# Patient Record
Sex: MALE | Race: WHITE | NOT HISPANIC OR LATINO | Employment: OTHER | URBAN - METROPOLITAN AREA
[De-identification: names, ages, dates, MRNs, and addresses within clinical notes are randomized per-mention and may not be internally consistent; named-entity substitution may affect disease eponyms.]

---

## 2017-01-12 ENCOUNTER — OFFICE VISIT (OUTPATIENT)
Dept: CARDIOLOGY | Facility: MEDICAL CENTER | Age: 62
End: 2017-01-12
Payer: MEDICARE

## 2017-01-12 VITALS
BODY MASS INDEX: 36.94 KG/M2 | HEART RATE: 76 BPM | DIASTOLIC BLOOD PRESSURE: 90 MMHG | SYSTOLIC BLOOD PRESSURE: 112 MMHG | OXYGEN SATURATION: 96 % | WEIGHT: 258 LBS | HEIGHT: 70 IN

## 2017-01-12 DIAGNOSIS — G45.9 TRANSIENT CEREBRAL ISCHEMIA, UNSPECIFIED TYPE: ICD-10-CM

## 2017-01-12 DIAGNOSIS — Z95.2 H/O MITRAL VALVE REPLACEMENT: ICD-10-CM

## 2017-01-12 DIAGNOSIS — Z79.01 CHRONIC ANTICOAGULATION: ICD-10-CM

## 2017-01-12 DIAGNOSIS — I34.2 NON-RHEUMATIC MITRAL VALVE STENOSIS: ICD-10-CM

## 2017-01-12 DIAGNOSIS — I48.91 ATRIAL FIBRILLATION, UNSPECIFIED TYPE (HCC): ICD-10-CM

## 2017-01-12 DIAGNOSIS — Z95.0 CARDIAC PACEMAKER IN SITU: ICD-10-CM

## 2017-01-12 PROCEDURE — 99214 OFFICE O/P EST MOD 30 MIN: CPT | Performed by: INTERNAL MEDICINE

## 2017-01-12 ASSESSMENT — ENCOUNTER SYMPTOMS
PALPITATIONS: 0
BLURRED VISION: 0
PND: 0
INSOMNIA: 0
ABDOMINAL PAIN: 0
LOSS OF CONSCIOUSNESS: 0
FEVER: 0
SHORTNESS OF BREATH: 1
DIZZINESS: 0
MYALGIAS: 0
ORTHOPNEA: 0
CHILLS: 0

## 2017-01-12 NOTE — Clinical Note
[facility]   [address], [address]  [place] [address]  Phone: [phone]  Fax: [phone]              Morales Olivier  1955    Encounter Date: 1/12/2017    Sumanth Yancey M.D.          PROGRESS NOTE:  Subjective:   Morales Olivier is a 61 y.o. male who presents today for follow up of mitral valve replacement and study result review.    Since the discharge from [facility] on 01/08/17 status post cardiac catheterization, he has continued to experience mild dyspnea and chest pain. He denies chest pain, palpitations, nausea/vomiting or diaphoresis.    Past Medical History   Diagnosis Date   • Gout    • CAD (coronary artery disease)    • Hypertension    • Atrial fibrillation (HCC)    • Pacemaker    • Heart valve disease      mitral valve replacement (bovine)   • Renal disorder      kidney stones   • Bronchitis    • Type II or unspecified type diabetes mellitus without mention of complication, not stated as uncontrolled      DM type II- diet controlled   • Personal history of venous thrombosis and embolism 2009     DVT right leg     Past Surgical History   Procedure Laterality Date   • Mitral valve replace  3/14/08     Performed by JEANA MARTELL at SURGERY [facility]   • Maze procedure  3/14/08     Performed by JEANA MARTELL at SURGERY [facility]   • Angiogram  7/3/2009     Performed by MORGAN WARD at SURGERY [facility]   • Angiogram  7/4/2009     Performed by MICHEL URBINA at SURGERY [facility]   • Cath removal  7/4/2009     Performed by MICHEL URBINA at SURGERY [facility]   • Thrombectomy  7/4/2009     Performed by MICHEL URBINA at [facility]   • Embolectomy  7/4/2009     Performed by MICHEL URBINA at [facility]   • Irrigation & debridement general  7/20/2009     Performed by MICHEL URBINA at [facility]   • Wide excision  7/20/2009     Performed by CIARA  "MICHEL F at SURGERY [facility]   • Other cardiac surgery  2008     mitral valve replacement   • Other abdominal surgery       imbulica repair      History reviewed. No pertinent family history.  History   Smoking status   • Former Smoker   • Quit date: 01/01/1981   Smokeless tobacco   • Never Used     No Known Allergies    Medications reviewed.    Outpatient Encounter Prescriptions as of 1/12/2017   Medication Sig Dispense Refill   • albuterol 108 (90 BASE) MCG/ACT Aero Soln inhalation aerosol Inhale 2 Puffs by mouth every 6 hours as needed for Shortness of Breath. 8.5 g 0   • tamsulosin (FLOMAX) 0.4 MG capsule Take 0.4 mg by mouth ONE-HALF HOUR AFTER BREAKFAST.     • metoprolol (LOPRESSOR) 25 MG TABS Take 1 Tab by mouth 2 Times a Day. 60 Tab 11   • warfarin (COUMADIN) 5 MG TABS Take 2.5-5 mg by mouth See Admin Instructions. 2.5 mg on Tuesday and Thursday   5 mg on Monday, Wednesday, Friday, Saturday , Sunday     • acetaminophen (TYLENOL) 500 MG TABS Take 1,000 mg by mouth 2 times a day as needed. Indications: Pain       No facility-administered encounter medications on file as of 1/12/2017.     Review of Systems   Constitutional: Positive for malaise/fatigue. Negative for fever and chills.   HENT: Negative for congestion.    Eyes: Negative for blurred vision.   Respiratory: Positive for shortness of breath.    Cardiovascular: Negative for chest pain, palpitations, orthopnea, leg swelling and PND.   Gastrointestinal: Negative for abdominal pain.   Genitourinary: Negative for dysuria.   Musculoskeletal: Negative for myalgias and joint pain.   Skin: Positive for itching and rash.   Neurological: Negative for dizziness and loss of consciousness.   Psychiatric/Behavioral: The patient does not have insomnia.         Objective:   /90 mmHg  Pulse 76  Ht 1.778 m (5' 10\")  Wt 117.028 kg (258 lb)  BMI 37.02 kg/m2  SpO2 96%    Physical Exam   Constitutional: He is oriented to person, place, and time. He appears " well-developed and well-nourished.   HENT:   Head: Normocephalic and atraumatic.   Eyes: Conjunctivae are normal. Pupils are equal, round, and reactive to light.   Neck: Normal range of motion. Neck supple.   Cardiovascular: Normal rate.  An irregularly irregular rhythm present.   Pulmonary/Chest: Effort normal and breath sounds normal.   Abdominal: Soft. Bowel sounds are normal.   Musculoskeletal: Normal range of motion. He exhibits no edema.   Neurological: He is alert and oriented to person, place, and time.   Skin: Skin is warm and dry. Rash noted.   Psychiatric: He has a normal mood and affect.     CARDIAC STUDIES/PROCEDURES:    CARDIAC CATHETERIZATION CONCLUSIONS by Dr. Kerr (01/08/17)  1.  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  2.  Pulmonary hypertension.  Moderate.  3.  EF 47%.  4.  Patent coronary arteries.    CARDIAC CATHETERIZATION CONCLUSIONS by Dr. Peterson (03/13/08)  3. Essentially normal coronary arterial tree and mild plaquing noted.  4. Unusually small distal half of the left anterior descending coronary artery.    CAROTID ULTRASOUND (11/15/14)  Antegrade flow, bilateral vertebral arteries.   Flow within both subclavian arteries appears to be within normal limits.   Normal bilateral cervical carotid system.    ECHOCARDIOGRAM CONCLUSIONS (11/15/16)  Prior echocardiogram 5/15/2016. Compared to the report of the study   done - there has been no significant change.   Grossly normal left ventricular systolic function.  Left ventricular ejection fraction is visually estimated to be 50%.  Diastolic function is difficult to assess.   Pacer/ICD wire seen in right ventricle.  Known mitral valve bioprosthesis with probable moderate to severe stenosis.  Moderate tricuspid regurgitation.  Estimated right ventricular systolic pressure is 45 mmHg.    ECHOCARDIOGRAM CONCLUSIONS (05/05/16)  Prior study is available for comparison, 06/30/2015.   Known mitral valve bioprosthesis with probable  stenosis based on ASE   criteria ( peak velocity 1.9-2.5 m/s, mean gradient 6-10).  Mean transvalvular gradient is 9 mmHg at a heart rate of  94 BPM.  Peak MV velocity 2.1 m/s Mild mitral regurgitation.  Mild concentric left ventricular hypertrophy.  Low normal left ventricular systolic function.  Left ventricular ejection fraction is visually estimated to be 50%.  Severely dilated left atrium.  Compared to the prior study of 6/3/2015, the prosthetic MV gradients   were actually slightly higher on the prior study.  The curent study is compatible with probable prosthetic valve stenosis   based on ASE criteria.    ECHOCARDIOGRAM CONCLUSIONS (06/30/15)  Normal left ventricular chamber size.   Mild concentric left ventricular hypertrophy.   Mildly reduced left ventricular systolic function.   Left ventricular ejection fraction is 50% to 55%.   Diastolic function is difficult to assess.  Severely dilated right atrium.   Catheter/pacemaker wire present in the right atrial cavity.  Severely dilated left atrium.  Mitral Valve Replacement. Mean transvalvular gradient is 9-10 mmHg.   Mild to moderate mitral regurgitation. At least moderate mitral stenosis.   Compared to the report of July 2014: the prosthetic mitral valve   stenosis is more severe. Prior mean gradient was 8 mm Hg.    ECHOCARDIOGRAM CONCLUSIONS (07/25/14)  Low normal left ventricular systolic function.  Left ventricular ejection fraction is 50% to 55%.  Bovine bioprosthetic valve with a mean gradient of 8 mmHg.  Moderate mitral regurgitation.  Moderate bioprosthetic valve stenosis.    EKG performed on (12/14/16) was reviewed: EKG shows sinus tachycardia.  EKG performed on (11/22/16) EKG shows sinus rhythm.  EKG performed on (01/28/15) EKG shows atrial fibrillation.    Laboratory results of (11/14/14) Cholesterol profile of 134/138/23/83 noted.    TRANSESOPHAGEAL ECHOCARDIOGRAM CONCLUSIONS by Dr. Fitch (12/06/16)  Patient in atrial fibrillation during exam.   Grossly normal   biventricular systolic function. Known bioprosthetic mitral valve with   evidence of leaflet thickening and decreased mobility. Severe   bioprosthetic mitral stenosis. Mean gradient 10 mm Hg, valve area by   3-D planimetry is 0.9 cm2.  Moderate-severe tricuspid regurgitation.    Assessment:     1. H/O mitral valve replacement    2. Atrial fibrillation    3. Cardiac pacemaker in situ    4. Chronic anticoagulation    5. Hypertension      Medical Decision Making:  Today's Assessment / Status / Plan:     1. History of mitral valve replacement (29-mm Perimount bioprosthetic valve), resection of left atrial tumor (thrombus) by Dr. Wright on 03/10/08: His recent echocardiogram showed progressing bioprosthetic valve stenosis and he is now symptomatic.this was confirmed with transesophageal echocardiogram with Dr. Fitch and cardiac catheterization by Dr. Kerr. He will consult with Dr. Wright tomorrow for possible redo surgery.  2. Atrial fibrillation/sick sinus syndrome with pacemaker and chronic anticoagulation therapy (warfarin): Stable.  3. Hypertension: Blood pressure is well controlled.  4. History of embolus to the right popliteal artery treated with thrombolytic.    We will follow up in three month to reassess his symptoms.    CC [facility]         No Recipients

## 2017-01-12 NOTE — MR AVS SNAPSHOT
"        Morales Olivier   2017 11:20 AM   Office Visit   MRN: [id number]    Department:  [facility]   Dept Phone:  [phone]    Description:  Male : 1955   Provider:  Sumanth Yancey M.D.           Reason for Visit     Follow-Up           Allergies as of 2017     No Known Allergies      You were diagnosed with     H/O mitral valve replacement   [319887]       Non-rheumatic mitral valve stenosis   [942233]       Atrial fibrillation, unspecified type ([facility])   [6232005]       Cardiac pacemaker in situ   [V45.01.ICD-9-CM]       Chronic anticoagulation   [298509]       Transient cerebral ischemia, unspecified type   [9531853]         Vital Signs     Blood Pressure Pulse Height Weight Body Mass Index Oxygen Saturation    112/90 mmHg 76 1.778 m (5' 10\") 117.028 kg (258 lb) 37.02 kg/m2 96%    Smoking Status                   Former Smoker           Basic Information     Date Of Birth Sex Race Ethnicity Preferred Language    1955 Male White Non- English      Your appointments     2017 11:20 AM   FOLLOW UP with Sumanth Yancey M.D.   [facility] (--)    [address], [facility] [address]  [facility] [id number] [phone]              Problem List              ICD-10-CM Priority Class Noted - Resolved    Open wound T14.8   2009 - Present    GOUT    2009 - Present    Anticoagulated Z79.01   2010 - Present    Hypertension I10   2010 - Present    Cardiac pacemaker in situ Z95.0   2014 - Present    Rib fracture S22.39XA   2014 - Present    Orbital floor fracture (HCC) S02.30XA   2014 - Present    TIA (transient ischemic attack) G45.9   2014 - Present    Atrial fibrillation (HCC) I48.91   2015 - Present    Chronic anticoagulation Z79.01   2015 - Present    H/O mitral valve replacement Z95.2   2015 - Present    COPD exacerbation (HCC) J44.1   2016 - Present    Bronchitis J40   2016 - Present   " Mitral stenosis I05.0   12/14/2016 - Present      Health Maintenance        Date Due Completion Dates    IMM PNEUMOCOCCAL 19-64 (ADULT) MEDIUM RISK SERIES (1 of 1 - PPSV23) 6/23/1973 ---    COLONOSCOPY 6/23/2004 ---    IMM ZOSTER VACCINE 6/23/2014 ---    IMM INFLUENZA (1) 9/1/2016 ---    IMM DTaP/Tdap/Td Vaccine (2 - Td) 7/31/2024 7/31/2014            Current Immunizations     Tdap Vaccine 7/31/2014  3:29 PM      Below and/or attached are the medications your provider expects you to take. Review all of your home medications and newly ordered medications with your provider and/or pharmacist. Follow medication instructions as directed by your provider and/or pharmacist. Please keep your medication list with you and share with your provider. Update the information when medications are discontinued, doses are changed, or new medications (including over-the-counter products) are added; and carry medication information at all times in the event of emergency situations     Allergies:  No Known Allergies          Medications  Valid as of: January 12, 2017 - 11:34 AM    Generic Name Brand Name Tablet Size Instructions for use    Acetaminophen (Tab) TYLENOL 500 MG Take 1,000 mg by mouth 2 times a day as needed. Indications: Pain        Albuterol Sulfate (Aero Soln) albuterol 108 (90 BASE) MCG/ACT Inhale 2 Puffs by mouth every 6 hours as needed for Shortness of Breath.        Metoprolol Tartrate (Tab) LOPRESSOR 25 MG Take 1 Tab by mouth 2 Times a Day.        Tamsulosin HCl (Cap) FLOMAX 0.4 MG Take 0.4 mg by mouth ONE-HALF HOUR AFTER BREAKFAST.        Warfarin Sodium (Tab) COUMADIN 5 MG Take 2.5-5 mg by mouth See Admin Instructions. 2.5 mg on Tuesday and Thursday   5 mg on Monday, Wednesday, Friday, Saturday , Sunday        .                 Medicines prescribed today were sent to:     [facility] [place] [address]    [address] [place] [address]    Phone: [phone] Fax: [phone]    Open 24 Hours?: No         Medication refill instructions:       If your prescription bottle indicates you have medication refills left, it is not necessary to call your provider’s office. Please contact your pharmacy and they will refill your medication.    If your prescription bottle indicates you do not have any refills left, you may request refills at any time through one of the following ways: The online [employer] system (except Urgent Care), by calling your provider’s office, or by asking your pharmacy to contact your provider’s office with a refill request. Medication refills are processed only during regular business hours and may not be available until the next business day. Your provider may request additional information or to have a follow-up visit with you prior to refilling your medication.   *Please Note: Medication refills are assigned a new Rx number when refilled electronically. Your pharmacy may indicate that no refills were authorized even though a new prescription for the same medication is available at the pharmacy. Please request the medicine by name with the pharmacy before contacting your provider for a refill.           [employer] Status: Patient Declined

## 2017-03-07 ENCOUNTER — HOSPITAL ENCOUNTER (OUTPATIENT)
Dept: RADIOLOGY | Facility: MEDICAL CENTER | Age: 62
DRG: 219 | End: 2017-03-07
Attending: THORACIC SURGERY (CARDIOTHORACIC VASCULAR SURGERY) | Admitting: THORACIC SURGERY (CARDIOTHORACIC VASCULAR SURGERY)
Payer: MEDICARE

## 2017-03-07 ENCOUNTER — HOSPITAL ENCOUNTER (OUTPATIENT)
Dept: CARDIOLOGY | Facility: MEDICAL CENTER | Age: 62
DRG: 219 | End: 2017-03-07
Attending: THORACIC SURGERY (CARDIOTHORACIC VASCULAR SURGERY) | Admitting: THORACIC SURGERY (CARDIOTHORACIC VASCULAR SURGERY)
Payer: MEDICARE

## 2017-03-07 DIAGNOSIS — I34.0 MITRAL VALVE INSUFFICIENCY, UNSPECIFIED ETIOLOGY: ICD-10-CM

## 2017-03-07 LAB
ABO GROUP BLD: NORMAL
ALBUMIN SERPL BCP-MCNC: 4.2 G/DL (ref 3.2–4.9)
ALBUMIN/GLOB SERPL: 2 G/DL
ALP SERPL-CCNC: 62 U/L (ref 30–99)
ALT SERPL-CCNC: 7 U/L (ref 2–50)
ANION GAP SERPL CALC-SCNC: 8 MMOL/L (ref 0–11.9)
APTT PPP: 27.1 SEC (ref 24.7–36)
AST SERPL-CCNC: 13 U/L (ref 12–45)
BASOPHILS # BLD AUTO: 0.5 % (ref 0–1.8)
BASOPHILS # BLD: 0.03 K/UL (ref 0–0.12)
BILIRUB SERPL-MCNC: 1.2 MG/DL (ref 0.1–1.5)
BLD GP AB SCN SERPL QL: NORMAL
BUN SERPL-MCNC: 21 MG/DL (ref 8–22)
CALCIUM SERPL-MCNC: 9.3 MG/DL (ref 8.5–10.5)
CFT BLD TEG: 7.3 MIN (ref 5–10)
CHLORIDE SERPL-SCNC: 110 MMOL/L (ref 96–112)
CLOT ANGLE BLD TEG: 59.3 DEGREES (ref 53–72)
CLOT LYSIS 30M P MA LENFR BLD TEG: 0 % (ref 0–8)
CO2 SERPL-SCNC: 22 MMOL/L (ref 20–33)
CREAT SERPL-MCNC: 1.23 MG/DL (ref 0.5–1.4)
CT.EXTRINSIC BLD ROTEM: 2.3 MIN (ref 1–3)
EKG IMPRESSION: NORMAL
EOSINOPHIL # BLD AUTO: 0.15 K/UL (ref 0–0.51)
EOSINOPHIL NFR BLD: 2.3 % (ref 0–6.9)
ERYTHROCYTE [DISTWIDTH] IN BLOOD BY AUTOMATED COUNT: 47.3 FL (ref 35.9–50)
EST. AVERAGE GLUCOSE BLD GHB EST-MCNC: 123 MG/DL
GLOBULIN SER CALC-MCNC: 2.1 G/DL (ref 1.9–3.5)
GLUCOSE SERPL-MCNC: 131 MG/DL (ref 65–99)
HBA1C MFR BLD: 5.9 % (ref 0–5.6)
HCT VFR BLD AUTO: 45.9 % (ref 42–52)
HGB BLD-MCNC: 15.4 G/DL (ref 14–18)
IMM GRANULOCYTES # BLD AUTO: 0.02 K/UL (ref 0–0.11)
IMM GRANULOCYTES NFR BLD AUTO: 0.3 % (ref 0–0.9)
INR PPP: 1.17 (ref 0.87–1.13)
LYMPHOCYTES # BLD AUTO: 0.76 K/UL (ref 1–4.8)
LYMPHOCYTES NFR BLD: 11.7 % (ref 22–41)
MCF BLD TEG: 61.1 MM (ref 50–70)
MCH RBC QN AUTO: 31.7 PG (ref 27–33)
MCHC RBC AUTO-ENTMCNC: 33.6 G/DL (ref 33.7–35.3)
MCV RBC AUTO: 94.4 FL (ref 81.4–97.8)
MONOCYTES # BLD AUTO: 0.47 K/UL (ref 0–0.85)
MONOCYTES NFR BLD AUTO: 7.3 % (ref 0–13.4)
NEUTROPHILS # BLD AUTO: 5.05 K/UL (ref 1.82–7.42)
NEUTROPHILS NFR BLD: 77.9 % (ref 44–72)
NRBC # BLD AUTO: 0 K/UL
NRBC BLD AUTO-RTO: 0 /100 WBC
PA AA BLD-ACNC: 6.8 %
PA ADP BLD-ACNC: 24.4 %
PLATELET # BLD AUTO: 160 K/UL (ref 164–446)
PMV BLD AUTO: 9.7 FL (ref 9–12.9)
POTASSIUM SERPL-SCNC: 4 MMOL/L (ref 3.6–5.5)
PROT SERPL-MCNC: 6.3 G/DL (ref 6–8.2)
PROTHROMBIN TIME: 15.3 SEC (ref 12–14.6)
RBC # BLD AUTO: 4.86 M/UL (ref 4.7–6.1)
RH BLD: NORMAL
SODIUM SERPL-SCNC: 140 MMOL/L (ref 135–145)
TEG ALGORITHM TGALG: NORMAL
WBC # BLD AUTO: 6.5 K/UL (ref 4.8–10.8)

## 2017-03-07 PROCEDURE — 71020 DX-CHEST-2 VIEWS: CPT

## 2017-03-07 PROCEDURE — 85347 COAGULATION TIME ACTIVATED: CPT

## 2017-03-07 PROCEDURE — 86900 BLOOD TYPING SEROLOGIC ABO: CPT

## 2017-03-07 PROCEDURE — 86850 RBC ANTIBODY SCREEN: CPT

## 2017-03-07 PROCEDURE — 93010 ELECTROCARDIOGRAM REPORT: CPT | Performed by: INTERNAL MEDICINE

## 2017-03-07 PROCEDURE — 81001 URINALYSIS AUTO W/SCOPE: CPT

## 2017-03-07 PROCEDURE — 86901 BLOOD TYPING SEROLOGIC RH(D): CPT

## 2017-03-07 PROCEDURE — 83036 HEMOGLOBIN GLYCOSYLATED A1C: CPT

## 2017-03-07 PROCEDURE — 93880 EXTRACRANIAL BILAT STUDY: CPT | Mod: 26 | Performed by: SURGERY

## 2017-03-07 PROCEDURE — 85730 THROMBOPLASTIN TIME PARTIAL: CPT

## 2017-03-07 PROCEDURE — 85610 PROTHROMBIN TIME: CPT

## 2017-03-07 PROCEDURE — 93005 ELECTROCARDIOGRAM TRACING: CPT | Performed by: THORACIC SURGERY (CARDIOTHORACIC VASCULAR SURGERY)

## 2017-03-07 PROCEDURE — 93880 EXTRACRANIAL BILAT STUDY: CPT

## 2017-03-07 PROCEDURE — 85576 BLOOD PLATELET AGGREGATION: CPT | Mod: 91

## 2017-03-07 PROCEDURE — 85384 FIBRINOGEN ACTIVITY: CPT

## 2017-03-07 PROCEDURE — 85025 COMPLETE CBC W/AUTO DIFF WBC: CPT

## 2017-03-07 PROCEDURE — 80053 COMPREHEN METABOLIC PANEL: CPT

## 2017-03-07 RX ORDER — NICOTINE 21 MG/24HR
14 PATCH, TRANSDERMAL 24 HOURS TRANSDERMAL
Status: DISCONTINUED | OUTPATIENT
Start: 2017-03-07 | End: 2017-03-08

## 2017-03-07 ASSESSMENT — LIFESTYLE VARIABLES
EVER_SMOKED: YES
ALCOHOL_USE: NO

## 2017-03-07 NOTE — PROGRESS NOTES
During pre-op appt/education, patient informed RN that he smokes marijuana almost daily.  Patient resides [place] [place] [place] [address] and is concerned about transportation and his ability to arrive on time tomorrow morning for his surgery.  Patients' ECs are his 2 sisters that live in [place].  He does too have a friend Gerald who lives in the motel with him, but he does not drive.  Patient also states that he only has $20 to last him until the end of the month.  This RN obtained cab voucher from  and provided it to patient along with instructions on how to use it tomorrow morning.  Linda CORTÉS with [facility] also notified of situation.

## 2017-03-07 NOTE — CARE PLAN
Problem: Pre Op  Goal: Optimal preparation for CABG/Heart Valve surgery  Intervention: Pre Op education to patient/significant other. Provide patient [facility] Patient Guideline for Cardiac Surgery (See Pt. Ed.)  Discussed anatomy and physiology of cardiac surgery with patient and family to include pre-op regimen. Reviewed post-op expectations to include  the use of incentive spirometry with return demonstration, ventilator management, cardiac monitoring, tubes and drains, early ambulation, and expected length of stay. Also provided information on Cardiac Rehab and how to schedule an appointment. Patient and family state full understanding of all information given.  Intervention: Baseline assessment documented to include IS volume, weight, bilateral BP and peripheral pulses.  2500 mL  Intervention: NPO at midnight except cardiac medications. (No ASA, coumadin or Plavix)  Instructed patient nothing to eat or drink after midnight the night prior to scheduled surgery date.  Intervention: Shower with chlorhexidine x 2  Instructed patient to wash entire body with chlorhexedine wipes prior to bedtime the night before surgery.

## 2017-03-08 ENCOUNTER — APPOINTMENT (OUTPATIENT)
Dept: RADIOLOGY | Facility: MEDICAL CENTER | Age: 62
DRG: 219 | End: 2017-03-08
Attending: THORACIC SURGERY (CARDIOTHORACIC VASCULAR SURGERY)
Payer: MEDICARE

## 2017-03-08 ENCOUNTER — HOSPITAL ENCOUNTER (INPATIENT)
Facility: MEDICAL CENTER | Age: 62
LOS: 4 days | DRG: 219 | End: 2017-03-12
Attending: THORACIC SURGERY (CARDIOTHORACIC VASCULAR SURGERY) | Admitting: THORACIC SURGERY (CARDIOTHORACIC VASCULAR SURGERY)
Payer: MEDICARE

## 2017-03-08 ENCOUNTER — RESOLUTE PROFESSIONAL BILLING HOSPITAL PROF FEE (OUTPATIENT)
Dept: OTHER | Facility: MEDICAL CENTER | Age: 62
End: 2017-03-08
Payer: MEDICARE

## 2017-03-08 ENCOUNTER — APPOINTMENT (OUTPATIENT)
Dept: RADIOLOGY | Facility: MEDICAL CENTER | Age: 62
DRG: 219 | End: 2017-03-08
Attending: NURSE PRACTITIONER
Payer: MEDICARE

## 2017-03-08 DIAGNOSIS — I05.9 MITRAL VALVE DISORDER: ICD-10-CM

## 2017-03-08 LAB
ACT BLD: 126 SEC (ref 74–137)
ACT BLD: 142 SEC (ref 74–137)
ACT BLD: 533 SEC (ref 74–137)
ACT BLD: 574 SEC (ref 74–137)
ACT BLD: 621 SEC (ref 74–137)
ACT BLD: 703 SEC (ref 74–137)
ACT BLD: 755 SEC (ref 74–137)
ACT BLD: 909 SEC (ref 74–137)
APTT PPP: 28.1 SEC (ref 24.7–36)
BARCODED ABORH UBTYP: 6200
BARCODED PRD CODE UBPRD: NORMAL
BARCODED UNIT NUM UBUNT: NORMAL
BASE EXCESS BLDA CALC-SCNC: -1 MMOL/L (ref -4–3)
BASE EXCESS BLDA CALC-SCNC: -1 MMOL/L (ref -4–3)
BASE EXCESS BLDA CALC-SCNC: -2 MMOL/L (ref -4–3)
BASE EXCESS BLDA CALC-SCNC: -4 MMOL/L (ref -4–3)
BASE EXCESS BLDA CALC-SCNC: -5 MMOL/L (ref -4–3)
BASE EXCESS BLDA CALC-SCNC: -5 MMOL/L (ref -4–3)
BASE EXCESS BLDA CALC-SCNC: -6 MMOL/L (ref -4–3)
BASE EXCESS BLDA CALC-SCNC: -6 MMOL/L (ref -4–3)
BASE EXCESS BLDA CALC-SCNC: -7 MMOL/L (ref -4–3)
BASE EXCESS BLDA CALC-SCNC: 0 MMOL/L (ref -4–3)
BASE EXCESS BLDV CALC-SCNC: -4 MMOL/L (ref -4–3)
BODY TEMPERATURE: ABNORMAL DEGREES
BODY TEMPERATURE: NORMAL DEGREES
CA-I BLD ISE-SCNC: 0.94 MMOL/L (ref 1.1–1.3)
CA-I BLD ISE-SCNC: 0.97 MMOL/L (ref 1.1–1.3)
CA-I BLD ISE-SCNC: 0.97 MMOL/L (ref 1.1–1.3)
CA-I BLD ISE-SCNC: 0.98 MMOL/L (ref 1.1–1.3)
CA-I BLD ISE-SCNC: 0.98 MMOL/L (ref 1.1–1.3)
CA-I BLD ISE-SCNC: 0.99 MMOL/L (ref 1.1–1.3)
CA-I BLD ISE-SCNC: 1.01 MMOL/L (ref 1.1–1.3)
CA-I BLD ISE-SCNC: 1.03 MMOL/L (ref 1.1–1.3)
CA-I BLD ISE-SCNC: 1.03 MMOL/L (ref 1.1–1.3)
CA-I BLD ISE-SCNC: 1.04 MMOL/L (ref 1.1–1.3)
CA-I BLD ISE-SCNC: 1.06 MMOL/L (ref 1.1–1.3)
CA-I BLD ISE-SCNC: 1.08 MMOL/L (ref 1.1–1.3)
CFT BLD TEG: 4.5 MIN (ref 5–10)
CFT P HPASE BLD TEG: 5.2 MIN (ref 5–10)
CLOT ANGLE BLD TEG: 70.8 DEGREES (ref 53–72)
CLOT ANGLE P HPASE BLD TEG: 64.4 DEGREES (ref 53–72)
CLOT INIT P HPASE BLD TEG: 1.9 MIN (ref 1–3)
CLOT LYSIS 30M P MA LENFR BLD TEG: 0 % (ref 0–8)
CLOT LYSIS 30M P MA LENFR BLD TEG: 0 % (ref 0–8)
CO2 BLDA-SCNC: 21 MMOL/L (ref 20–33)
CO2 BLDA-SCNC: 22 MMOL/L (ref 20–33)
CO2 BLDA-SCNC: 22 MMOL/L (ref 20–33)
CO2 BLDA-SCNC: 23 MMOL/L (ref 20–33)
CO2 BLDA-SCNC: 24 MMOL/L (ref 20–33)
CO2 BLDA-SCNC: 25 MMOL/L (ref 20–33)
CO2 BLDA-SCNC: 26 MMOL/L (ref 20–33)
CO2 BLDA-SCNC: 26 MMOL/L (ref 20–33)
CO2 BLDV-SCNC: 23 MMOL/L (ref 20–33)
COMPONENT P 8504P: NORMAL
CT.EXTRINSIC BLD ROTEM: 1.5 MIN (ref 1–3)
GLUCOSE BLD-MCNC: 104 MG/DL (ref 65–99)
GLUCOSE BLD-MCNC: 108 MG/DL (ref 65–99)
GLUCOSE BLD-MCNC: 112 MG/DL (ref 65–99)
GLUCOSE BLD-MCNC: 116 MG/DL (ref 65–99)
GLUCOSE BLD-MCNC: 120 MG/DL (ref 65–99)
GLUCOSE BLD-MCNC: 126 MG/DL (ref 65–99)
GLUCOSE BLD-MCNC: 145 MG/DL (ref 65–99)
GLUCOSE BLD-MCNC: 157 MG/DL (ref 65–99)
GLUCOSE BLD-MCNC: 163 MG/DL (ref 65–99)
GLUCOSE BLD-MCNC: 165 MG/DL (ref 65–99)
GLUCOSE BLD-MCNC: 169 MG/DL (ref 65–99)
HCO3 BLDA-SCNC: 19.4 MMOL/L (ref 17–25)
HCO3 BLDA-SCNC: 19.6 MMOL/L (ref 17–25)
HCO3 BLDA-SCNC: 19.9 MMOL/L (ref 17–25)
HCO3 BLDA-SCNC: 20.2 MMOL/L (ref 17–25)
HCO3 BLDA-SCNC: 20.3 MMOL/L (ref 17–25)
HCO3 BLDA-SCNC: 20.4 MMOL/L (ref 17–25)
HCO3 BLDA-SCNC: 21.6 MMOL/L (ref 17–25)
HCO3 BLDA-SCNC: 23.2 MMOL/L (ref 17–25)
HCO3 BLDA-SCNC: 23.6 MMOL/L (ref 17–25)
HCO3 BLDA-SCNC: 23.7 MMOL/L (ref 17–25)
HCO3 BLDA-SCNC: 23.7 MMOL/L (ref 17–25)
HCO3 BLDA-SCNC: 24.2 MMOL/L (ref 17–25)
HCO3 BLDA-SCNC: 24.7 MMOL/L (ref 17–25)
HCO3 BLDA-SCNC: 25.2 MMOL/L (ref 17–25)
HCO3 BLDV-SCNC: 21.6 MMOL/L (ref 24–28)
HCT VFR BLD CALC: 28 % (ref 42–52)
HCT VFR BLD CALC: 29 % (ref 42–52)
HCT VFR BLD CALC: 30 % (ref 42–52)
HCT VFR BLD CALC: 30 % (ref 42–52)
HCT VFR BLD CALC: 31 % (ref 42–52)
HCT VFR BLD CALC: 32 % (ref 42–52)
HCT VFR BLD CALC: 35 % (ref 42–52)
HCT VFR BLD CALC: 36 % (ref 42–52)
HCT VFR BLD CALC: 38 % (ref 42–52)
HCT VFR BLD CALC: 38 % (ref 42–52)
HCT VFR BLD CALC: 39 % (ref 42–52)
HCT VFR BLD CALC: 39 % (ref 42–52)
HGB BLD-MCNC: 10.2 G/DL (ref 14–18)
HGB BLD-MCNC: 10.2 G/DL (ref 14–18)
HGB BLD-MCNC: 10.5 G/DL (ref 14–18)
HGB BLD-MCNC: 10.9 G/DL (ref 14–18)
HGB BLD-MCNC: 11.9 G/DL (ref 14–18)
HGB BLD-MCNC: 12.2 G/DL (ref 14–18)
HGB BLD-MCNC: 12.9 G/DL (ref 14–18)
HGB BLD-MCNC: 12.9 G/DL (ref 14–18)
HGB BLD-MCNC: 13.3 G/DL (ref 14–18)
HGB BLD-MCNC: 13.3 G/DL (ref 14–18)
HGB BLD-MCNC: 9.5 G/DL (ref 14–18)
HGB BLD-MCNC: 9.9 G/DL (ref 14–18)
INR PPP: 1.11 (ref 0.87–1.13)
INR PPP: 1.51 (ref 0.87–1.13)
MAGNESIUM SERPL-MCNC: 2.9 MG/DL (ref 1.5–2.5)
MCF BLD TEG: 55.7 MM (ref 50–70)
MCF P HPASE BLD TEG: 61.9 MM (ref 50–70)
O2/TOTAL GAS SETTING VFR VENT: 100 %
O2/TOTAL GAS SETTING VFR VENT: 40 %
O2/TOTAL GAS SETTING VFR VENT: 80 %
PCO2 BLDA: 32.5 MMHG (ref 26–37)
PCO2 BLDA: 34.4 MMHG (ref 26–37)
PCO2 BLDA: 34.9 MMHG (ref 26–37)
PCO2 BLDA: 35.2 MMHG (ref 26–37)
PCO2 BLDA: 38.7 MMHG (ref 26–37)
PCO2 BLDA: 39.4 MMHG (ref 26–37)
PCO2 BLDA: 40.2 MMHG (ref 26–37)
PCO2 BLDA: 40.7 MMHG (ref 26–37)
PCO2 BLDA: 41.5 MMHG (ref 26–37)
PCO2 BLDA: 41.5 MMHG (ref 26–37)
PCO2 BLDA: 41.6 MMHG (ref 26–37)
PCO2 BLDA: 41.7 MMHG (ref 26–37)
PCO2 BLDA: 42.2 MMHG (ref 26–37)
PCO2 BLDA: 42.8 MMHG (ref 26–37)
PCO2 BLDV: 39.5 MMHG (ref 41–51)
PCO2 TEMP ADJ BLDA: 33.6 MMHG (ref 26–37)
PCO2 TEMP ADJ BLDA: 34.4 MMHG (ref 26–37)
PCO2 TEMP ADJ BLDA: 35.1 MMHG (ref 26–37)
PCO2 TEMP ADJ BLDA: 35.2 MMHG (ref 26–37)
PCO2 TEMP ADJ BLDA: 38.7 MMHG (ref 26–37)
PCO2 TEMP ADJ BLDA: 39.4 MMHG (ref 26–37)
PCO2 TEMP ADJ BLDA: 40.2 MMHG (ref 26–37)
PCO2 TEMP ADJ BLDA: 40.7 MMHG (ref 26–37)
PCO2 TEMP ADJ BLDA: 40.9 MMHG (ref 26–37)
PCO2 TEMP ADJ BLDA: 41.5 MMHG (ref 26–37)
PCO2 TEMP ADJ BLDA: 41.5 MMHG (ref 26–37)
PCO2 TEMP ADJ BLDA: 41.7 MMHG (ref 26–37)
PCO2 TEMP ADJ BLDA: 42.1 MMHG (ref 26–37)
PCO2 TEMP ADJ BLDA: 42.2 MMHG (ref 26–37)
PCO2 TEMP ADJ BLDV: 39.5 MMHG (ref 41–51)
PH BLDA: 7.29 [PH] (ref 7.4–7.5)
PH BLDA: 7.29 [PH] (ref 7.4–7.5)
PH BLDA: 7.31 [PH] (ref 7.4–7.5)
PH BLDA: 7.32 [PH] (ref 7.4–7.5)
PH BLDA: 7.35 [PH] (ref 7.4–7.5)
PH BLDA: 7.36 [PH] (ref 7.4–7.5)
PH BLDA: 7.37 [PH] (ref 7.4–7.5)
PH BLDA: 7.38 [PH] (ref 7.4–7.5)
PH BLDA: 7.38 [PH] (ref 7.4–7.5)
PH BLDA: 7.39 [PH] (ref 7.4–7.5)
PH BLDA: 7.41 [PH] (ref 7.4–7.5)
PH BLDA: 7.46 [PH] (ref 7.4–7.5)
PH BLDV: 7.35 [PH] (ref 7.31–7.45)
PH TEMP ADJ BLDA: 7.29 [PH] (ref 7.4–7.5)
PH TEMP ADJ BLDA: 7.29 [PH] (ref 7.4–7.5)
PH TEMP ADJ BLDA: 7.31 [PH] (ref 7.4–7.5)
PH TEMP ADJ BLDA: 7.32 [PH] (ref 7.4–7.5)
PH TEMP ADJ BLDA: 7.36 [PH] (ref 7.4–7.5)
PH TEMP ADJ BLDA: 7.36 [PH] (ref 7.4–7.5)
PH TEMP ADJ BLDA: 7.37 [PH] (ref 7.4–7.5)
PH TEMP ADJ BLDA: 7.38 [PH] (ref 7.4–7.5)
PH TEMP ADJ BLDA: 7.38 [PH] (ref 7.4–7.5)
PH TEMP ADJ BLDA: 7.39 [PH] (ref 7.4–7.5)
PH TEMP ADJ BLDA: 7.41 [PH] (ref 7.4–7.5)
PH TEMP ADJ BLDA: 7.45 [PH] (ref 7.4–7.5)
PH TEMP ADJ BLDV: 7.35 [PH] (ref 7.31–7.45)
PLATELET # BLD AUTO: 196 K/UL (ref 164–446)
PO2 BLDA: 100 MMHG (ref 64–87)
PO2 BLDA: 264 MMHG (ref 64–87)
PO2 BLDA: 273 MMHG (ref 64–87)
PO2 BLDA: 278 MMHG (ref 64–87)
PO2 BLDA: 338 MMHG (ref 64–87)
PO2 BLDA: 361 MMHG (ref 64–87)
PO2 BLDA: 410 MMHG (ref 64–87)
PO2 BLDA: 431 MMHG (ref 64–87)
PO2 BLDA: 58 MMHG (ref 64–87)
PO2 BLDA: 64 MMHG (ref 64–87)
PO2 BLDA: 66 MMHG (ref 64–87)
PO2 BLDA: 71 MMHG (ref 64–87)
PO2 BLDA: 76 MMHG (ref 64–87)
PO2 BLDA: 91 MMHG (ref 64–87)
PO2 BLDV: 51 MMHG (ref 25–40)
PO2 TEMP ADJ BLDA: 100 MMHG (ref 64–87)
PO2 TEMP ADJ BLDA: 264 MMHG (ref 64–87)
PO2 TEMP ADJ BLDA: 273 MMHG (ref 64–87)
PO2 TEMP ADJ BLDA: 278 MMHG (ref 64–87)
PO2 TEMP ADJ BLDA: 338 MMHG (ref 64–87)
PO2 TEMP ADJ BLDA: 361 MMHG (ref 64–87)
PO2 TEMP ADJ BLDA: 410 MMHG (ref 64–87)
PO2 TEMP ADJ BLDA: 431 MMHG (ref 64–87)
PO2 TEMP ADJ BLDA: 57 MMHG (ref 64–87)
PO2 TEMP ADJ BLDA: 66 MMHG (ref 64–87)
PO2 TEMP ADJ BLDA: 68 MMHG (ref 64–87)
PO2 TEMP ADJ BLDA: 72 MMHG (ref 64–87)
PO2 TEMP ADJ BLDA: 74 MMHG (ref 64–87)
PO2 TEMP ADJ BLDA: 89 MMHG (ref 64–87)
PO2 TEMP ADJ BLDV: 51 MMHG (ref 25–40)
POTASSIUM BLD-SCNC: 3 MMOL/L (ref 3.6–5.5)
POTASSIUM BLD-SCNC: 3.7 MMOL/L (ref 3.6–5.5)
POTASSIUM BLD-SCNC: 3.8 MMOL/L (ref 3.6–5.5)
POTASSIUM BLD-SCNC: 3.9 MMOL/L (ref 3.6–5.5)
POTASSIUM BLD-SCNC: 4 MMOL/L (ref 3.6–5.5)
POTASSIUM BLD-SCNC: 4 MMOL/L (ref 3.6–5.5)
POTASSIUM BLD-SCNC: 4.1 MMOL/L (ref 3.6–5.5)
POTASSIUM BLD-SCNC: 4.2 MMOL/L (ref 3.6–5.5)
POTASSIUM BLD-SCNC: 4.7 MMOL/L (ref 3.6–5.5)
POTASSIUM BLD-SCNC: 5 MMOL/L (ref 3.6–5.5)
POTASSIUM BLD-SCNC: 5 MMOL/L (ref 3.6–5.5)
POTASSIUM BLD-SCNC: 5.3 MMOL/L (ref 3.6–5.5)
POTASSIUM SERPL-SCNC: 4.6 MMOL/L (ref 3.6–5.5)
PRODUCT TYPE UPROD: NORMAL
PROTHROMBIN TIME: 14.7 SEC (ref 12–14.6)
PROTHROMBIN TIME: 18.7 SEC (ref 12–14.6)
SAO2 % BLDA: 100 % (ref 93–99)
SAO2 % BLDA: 89 % (ref 93–99)
SAO2 % BLDA: 90 % (ref 93–99)
SAO2 % BLDA: 94 % (ref 93–99)
SAO2 % BLDA: 94 % (ref 93–99)
SAO2 % BLDA: 95 % (ref 93–99)
SAO2 % BLDA: 96 % (ref 93–99)
SAO2 % BLDA: 97 % (ref 93–99)
SAO2 % BLDV: 84 %
SODIUM BLD-SCNC: 138 MMOL/L (ref 135–145)
SODIUM BLD-SCNC: 139 MMOL/L (ref 135–145)
SODIUM BLD-SCNC: 139 MMOL/L (ref 135–145)
SODIUM BLD-SCNC: 140 MMOL/L (ref 135–145)
SODIUM BLD-SCNC: 141 MMOL/L (ref 135–145)
SODIUM BLD-SCNC: 142 MMOL/L (ref 135–145)
SODIUM BLD-SCNC: 143 MMOL/L (ref 135–145)
SODIUM BLD-SCNC: 144 MMOL/L (ref 135–145)
SPECIMEN DRAWN FROM PATIENT: ABNORMAL
SPECIMEN DRAWN FROM PATIENT: NORMAL
TEG ALGORITHM TGALG: ABNORMAL
UNIT STATUS USTAT: NORMAL

## 2017-03-08 PROCEDURE — 500890 HCHG PACK, OPEN HEART: Performed by: THORACIC SURGERY (CARDIOTHORACIC VASCULAR SURGERY)

## 2017-03-08 PROCEDURE — 93005 ELECTROCARDIOGRAM TRACING: CPT | Performed by: NURSE PRACTITIONER

## 2017-03-08 PROCEDURE — 84132 ASSAY OF SERUM POTASSIUM: CPT | Mod: 91

## 2017-03-08 PROCEDURE — 82330 ASSAY OF CALCIUM: CPT | Mod: 91

## 2017-03-08 PROCEDURE — 503001 HCHG PERFUSION: Performed by: THORACIC SURGERY (CARDIOTHORACIC VASCULAR SURGERY)

## 2017-03-08 PROCEDURE — C1894 INTRO/SHEATH, NON-LASER: HCPCS | Performed by: THORACIC SURGERY (CARDIOTHORACIC VASCULAR SURGERY)

## 2017-03-08 PROCEDURE — 99291 CRITICAL CARE FIRST HOUR: CPT | Performed by: INTERNAL MEDICINE

## 2017-03-08 PROCEDURE — 110382 HCHG SHELL REV 271: Performed by: THORACIC SURGERY (CARDIOTHORACIC VASCULAR SURGERY)

## 2017-03-08 PROCEDURE — A4450 NON-WATERPROOF TAPE: HCPCS | Performed by: THORACIC SURGERY (CARDIOTHORACIC VASCULAR SURGERY)

## 2017-03-08 PROCEDURE — 74000 DX-ABDOMEN-1 VIEW: CPT

## 2017-03-08 PROCEDURE — 94150 VITAL CAPACITY TEST: CPT

## 2017-03-08 PROCEDURE — 93312 ECHO TRANSESOPHAGEAL: CPT

## 2017-03-08 PROCEDURE — 503050 HCHG COR-KNOT QUICK LOADS 6PACK: Performed by: THORACIC SURGERY (CARDIOTHORACIC VASCULAR SURGERY)

## 2017-03-08 PROCEDURE — 83735 ASSAY OF MAGNESIUM: CPT

## 2017-03-08 PROCEDURE — 30233R1 TRANSFUSION OF NONAUTOLOGOUS PLATELETS INTO PERIPHERAL VEIN, PERCUTANEOUS APPROACH: ICD-10-PCS | Performed by: THORACIC SURGERY (CARDIOTHORACIC VASCULAR SURGERY)

## 2017-03-08 PROCEDURE — 700111 HCHG RX REV CODE 636 W/ 250 OVERRIDE (IP): Performed by: THORACIC SURGERY (CARDIOTHORACIC VASCULAR SURGERY)

## 2017-03-08 PROCEDURE — 700105 HCHG RX REV CODE 258: Performed by: NURSE PRACTITIONER

## 2017-03-08 PROCEDURE — C1898 LEAD, PMKR, OTHER THAN TRANS: HCPCS | Performed by: THORACIC SURGERY (CARDIOTHORACIC VASCULAR SURGERY)

## 2017-03-08 PROCEDURE — A9270 NON-COVERED ITEM OR SERVICE: HCPCS | Performed by: NURSE PRACTITIONER

## 2017-03-08 PROCEDURE — 501506 HCHG SUTURE GUIDE, VALVE REPLACEMENT: Performed by: THORACIC SURGERY (CARDIOTHORACIC VASCULAR SURGERY)

## 2017-03-08 PROCEDURE — 88300 SURGICAL PATH GROSS: CPT

## 2017-03-08 PROCEDURE — 71010 DX-CHEST-PORTABLE (1 VIEW): CPT

## 2017-03-08 PROCEDURE — A6402 STERILE GAUZE <= 16 SQ IN: HCPCS | Performed by: THORACIC SURGERY (CARDIOTHORACIC VASCULAR SURGERY)

## 2017-03-08 PROCEDURE — 700102 HCHG RX REV CODE 250 W/ 637 OVERRIDE(OP): Performed by: THORACIC SURGERY (CARDIOTHORACIC VASCULAR SURGERY)

## 2017-03-08 PROCEDURE — B24BZZ4 ULTRASONOGRAPHY OF HEART WITH AORTA, TRANSESOPHAGEAL: ICD-10-PCS | Performed by: THORACIC SURGERY (CARDIOTHORACIC VASCULAR SURGERY)

## 2017-03-08 PROCEDURE — 160024 HCHG STAT PERFUSION STAT: Performed by: THORACIC SURGERY (CARDIOTHORACIC VASCULAR SURGERY)

## 2017-03-08 PROCEDURE — 501519 HCHG SUTURE, E PACK: Performed by: THORACIC SURGERY (CARDIOTHORACIC VASCULAR SURGERY)

## 2017-03-08 PROCEDURE — 700101 HCHG RX REV CODE 250

## 2017-03-08 PROCEDURE — 02RG08Z REPLACEMENT OF MITRAL VALVE WITH ZOOPLASTIC TISSUE, OPEN APPROACH: ICD-10-PCS | Performed by: THORACIC SURGERY (CARDIOTHORACIC VASCULAR SURGERY)

## 2017-03-08 PROCEDURE — 502240 HCHG MISC OR SUPPLY RC 0272: Performed by: THORACIC SURGERY (CARDIOTHORACIC VASCULAR SURGERY)

## 2017-03-08 PROCEDURE — 82962 GLUCOSE BLOOD TEST: CPT | Mod: 91

## 2017-03-08 PROCEDURE — 503034: Performed by: THORACIC SURGERY (CARDIOTHORACIC VASCULAR SURGERY)

## 2017-03-08 PROCEDURE — 503000 HCHG SUTURE, OHS: Performed by: THORACIC SURGERY (CARDIOTHORACIC VASCULAR SURGERY)

## 2017-03-08 PROCEDURE — 700101 HCHG RX REV CODE 250: Performed by: THORACIC SURGERY (CARDIOTHORACIC VASCULAR SURGERY)

## 2017-03-08 PROCEDURE — 500002 HCHG ADHESIVE, DERMABOND: Performed by: THORACIC SURGERY (CARDIOTHORACIC VASCULAR SURGERY)

## 2017-03-08 PROCEDURE — 160009 HCHG ANES TIME/MIN: Performed by: THORACIC SURGERY (CARDIOTHORACIC VASCULAR SURGERY)

## 2017-03-08 PROCEDURE — P9047 ALBUMIN (HUMAN), 25%, 50ML: HCPCS

## 2017-03-08 PROCEDURE — A6403 STERILE GAUZE>16 <= 48 SQ IN: HCPCS | Performed by: THORACIC SURGERY (CARDIOTHORACIC VASCULAR SURGERY)

## 2017-03-08 PROCEDURE — 700111 HCHG RX REV CODE 636 W/ 250 OVERRIDE (IP): Performed by: NURSE PRACTITIONER

## 2017-03-08 PROCEDURE — 503046: Performed by: THORACIC SURGERY (CARDIOTHORACIC VASCULAR SURGERY)

## 2017-03-08 PROCEDURE — 93325 DOPPLER ECHO COLOR FLOW MAPG: CPT

## 2017-03-08 PROCEDURE — 502627 HCHG HEMOSTAT, SURGICEL 4X4: Performed by: THORACIC SURGERY (CARDIOTHORACIC VASCULAR SURGERY)

## 2017-03-08 PROCEDURE — 160048 HCHG OR STATISTICAL LEVEL 1-5: Performed by: THORACIC SURGERY (CARDIOTHORACIC VASCULAR SURGERY)

## 2017-03-08 PROCEDURE — C1729 CATH, DRAINAGE: HCPCS | Performed by: THORACIC SURGERY (CARDIOTHORACIC VASCULAR SURGERY)

## 2017-03-08 PROCEDURE — A4606 OXYGEN PROBE USED W OXIMETER: HCPCS | Performed by: THORACIC SURGERY (CARDIOTHORACIC VASCULAR SURGERY)

## 2017-03-08 PROCEDURE — 500053 HCHG BANDAGE, ELASTIC 4: Performed by: THORACIC SURGERY (CARDIOTHORACIC VASCULAR SURGERY)

## 2017-03-08 PROCEDURE — 85730 THROMBOPLASTIN TIME PARTIAL: CPT

## 2017-03-08 PROCEDURE — 84295 ASSAY OF SERUM SODIUM: CPT | Mod: 91

## 2017-03-08 PROCEDURE — 85049 AUTOMATED PLATELET COUNT: CPT

## 2017-03-08 PROCEDURE — 37799 UNLISTED PX VASCULAR SURGERY: CPT

## 2017-03-08 PROCEDURE — 82947 ASSAY GLUCOSE BLOOD QUANT: CPT | Mod: 91

## 2017-03-08 PROCEDURE — 93010 ELECTROCARDIOGRAM REPORT: CPT | Performed by: INTERNAL MEDICINE

## 2017-03-08 PROCEDURE — 500424 HCHG DRESSING, AIRSTRIP: Performed by: THORACIC SURGERY (CARDIOTHORACIC VASCULAR SURGERY)

## 2017-03-08 PROCEDURE — C9248 INJ, CLEVIDIPINE BUTYRATE: HCPCS

## 2017-03-08 PROCEDURE — 160042 HCHG SURGERY MINUTES - EA ADDL 1 MIN LEVEL 5: Performed by: THORACIC SURGERY (CARDIOTHORACIC VASCULAR SURGERY)

## 2017-03-08 PROCEDURE — 160031 HCHG SURGERY MINUTES - 1ST 30 MINS LEVEL 5: Performed by: THORACIC SURGERY (CARDIOTHORACIC VASCULAR SURGERY)

## 2017-03-08 PROCEDURE — 85610 PROTHROMBIN TIME: CPT

## 2017-03-08 PROCEDURE — 700102 HCHG RX REV CODE 250 W/ 637 OVERRIDE(OP): Performed by: NURSE PRACTITIONER

## 2017-03-08 PROCEDURE — 770022 HCHG ROOM/CARE - ICU (200)

## 2017-03-08 PROCEDURE — 110371 HCHG SHELL REV 272: Performed by: THORACIC SURGERY (CARDIOTHORACIC VASCULAR SURGERY)

## 2017-03-08 PROCEDURE — 700105 HCHG RX REV CODE 258: Performed by: THORACIC SURGERY (CARDIOTHORACIC VASCULAR SURGERY)

## 2017-03-08 PROCEDURE — C9248 INJ, CLEVIDIPINE BUTYRATE: HCPCS | Performed by: NURSE PRACTITIONER

## 2017-03-08 PROCEDURE — 500257: Performed by: THORACIC SURGERY (CARDIOTHORACIC VASCULAR SURGERY)

## 2017-03-08 PROCEDURE — 36430 TRANSFUSION BLD/BLD COMPNT: CPT

## 2017-03-08 PROCEDURE — 500446 HCHG HEMOSTAT, SURGICAL 6X9: Performed by: THORACIC SURGERY (CARDIOTHORACIC VASCULAR SURGERY)

## 2017-03-08 PROCEDURE — 94002 VENT MGMT INPAT INIT DAY: CPT

## 2017-03-08 PROCEDURE — 500734 HCHG INSERT, STEALTH: Performed by: THORACIC SURGERY (CARDIOTHORACIC VASCULAR SURGERY)

## 2017-03-08 PROCEDURE — 85347 COAGULATION TIME ACTIVATED: CPT

## 2017-03-08 PROCEDURE — 700111 HCHG RX REV CODE 636 W/ 250 OVERRIDE (IP)

## 2017-03-08 PROCEDURE — P9034 PLATELETS, PHERESIS: HCPCS

## 2017-03-08 PROCEDURE — 501879 HCHG BONE PUTTY HEMOSTATIC (59): Performed by: THORACIC SURGERY (CARDIOTHORACIC VASCULAR SURGERY)

## 2017-03-08 PROCEDURE — 71010 DX-CHEST-LIMITED (1 VIEW): CPT

## 2017-03-08 PROCEDURE — 700105 HCHG RX REV CODE 258

## 2017-03-08 PROCEDURE — 501745 HCHG WIRE, SURGICAL STEEL: Performed by: THORACIC SURGERY (CARDIOTHORACIC VASCULAR SURGERY)

## 2017-03-08 PROCEDURE — 500385 HCHG DRAIN, PLEUROVAC ADUL: Performed by: THORACIC SURGERY (CARDIOTHORACIC VASCULAR SURGERY)

## 2017-03-08 PROCEDURE — 94770 HCHG CO2 EXPIRED GAS DETERMINATION: CPT

## 2017-03-08 PROCEDURE — 5A1221Z PERFORMANCE OF CARDIAC OUTPUT, CONTINUOUS: ICD-10-PCS | Performed by: THORACIC SURGERY (CARDIOTHORACIC VASCULAR SURGERY)

## 2017-03-08 PROCEDURE — 85576 BLOOD PLATELET AGGREGATION: CPT

## 2017-03-08 PROCEDURE — 85347 COAGULATION TIME ACTIVATED: CPT | Mod: 91

## 2017-03-08 PROCEDURE — 85014 HEMATOCRIT: CPT | Mod: 91

## 2017-03-08 PROCEDURE — 85384 FIBRINOGEN ACTIVITY: CPT | Mod: 91

## 2017-03-08 PROCEDURE — 700101 HCHG RX REV CODE 250: Performed by: INTERNAL MEDICINE

## 2017-03-08 PROCEDURE — 700101 HCHG RX REV CODE 250: Performed by: NURSE PRACTITIONER

## 2017-03-08 PROCEDURE — 82803 BLOOD GASES ANY COMBINATION: CPT | Mod: 91

## 2017-03-08 DEVICE — VALVE MOSAIC MITRAL 29MM ---ALWAYS OVERNITE INCLUDING: Type: IMPLANTABLE DEVICE | Status: FUNCTIONAL

## 2017-03-08 DEVICE — BONE PUTTY HEMOSTATIC ABSORBABLE (12/BX): Type: IMPLANTABLE DEVICE | Status: FUNCTIONAL

## 2017-03-08 RX ORDER — OXYCODONE HYDROCHLORIDE 10 MG/1
5 TABLET ORAL
Status: DISCONTINUED | OUTPATIENT
Start: 2017-03-08 | End: 2017-03-12 | Stop reason: HOSPADM

## 2017-03-08 RX ORDER — ACETAMINOPHEN 325 MG/1
325 TABLET ORAL EVERY 4 HOURS PRN
Status: DISCONTINUED | OUTPATIENT
Start: 2017-03-08 | End: 2017-03-08

## 2017-03-08 RX ORDER — LIDOCAINE HYDROCHLORIDE 10 MG/ML
INJECTION, SOLUTION INFILTRATION; PERINEURAL
Status: ACTIVE
Start: 2017-03-08 | End: 2017-03-09

## 2017-03-08 RX ORDER — DIPHENHYDRAMINE HCL 25 MG
25 TABLET ORAL
Status: DISCONTINUED | OUTPATIENT
Start: 2017-03-08 | End: 2017-03-12 | Stop reason: HOSPADM

## 2017-03-08 RX ORDER — TAMSULOSIN HYDROCHLORIDE 0.4 MG/1
0.4 CAPSULE ORAL
Status: DISCONTINUED | OUTPATIENT
Start: 2017-03-09 | End: 2017-03-12 | Stop reason: HOSPADM

## 2017-03-08 RX ORDER — DOCUSATE SODIUM 100 MG/1
100 CAPSULE, LIQUID FILLED ORAL EVERY MORNING
Status: DISCONTINUED | OUTPATIENT
Start: 2017-03-08 | End: 2017-03-12 | Stop reason: HOSPADM

## 2017-03-08 RX ORDER — DEXTROSE MONOHYDRATE 25 G/50ML
25 INJECTION, SOLUTION INTRAVENOUS PRN
Status: DISCONTINUED | OUTPATIENT
Start: 2017-03-08 | End: 2017-03-11

## 2017-03-08 RX ORDER — DOCUSATE SODIUM 100 MG/1
100 CAPSULE, LIQUID FILLED ORAL EVERY MORNING
Status: DISCONTINUED | OUTPATIENT
Start: 2017-03-08 | End: 2017-03-08

## 2017-03-08 RX ORDER — AMOXICILLIN 250 MG
1 CAPSULE ORAL
Status: DISCONTINUED | OUTPATIENT
Start: 2017-03-08 | End: 2017-03-12 | Stop reason: HOSPADM

## 2017-03-08 RX ORDER — AMOXICILLIN 250 MG
1 CAPSULE ORAL
Status: DISCONTINUED | OUTPATIENT
Start: 2017-03-08 | End: 2017-03-08

## 2017-03-08 RX ORDER — SODIUM CHLORIDE 9 MG/ML
INJECTION, SOLUTION INTRAVENOUS CONTINUOUS
Status: DISCONTINUED | OUTPATIENT
Start: 2017-03-08 | End: 2017-03-12 | Stop reason: HOSPADM

## 2017-03-08 RX ORDER — ONDANSETRON 2 MG/ML
4 INJECTION INTRAMUSCULAR; INTRAVENOUS EVERY 6 HOURS PRN
Status: DISCONTINUED | OUTPATIENT
Start: 2017-03-08 | End: 2017-03-12 | Stop reason: HOSPADM

## 2017-03-08 RX ORDER — CEFAZOLIN SODIUM 2 G/100ML
2 INJECTION, SOLUTION INTRAVENOUS ONCE
Status: COMPLETED | OUTPATIENT
Start: 2017-03-08 | End: 2017-03-08

## 2017-03-08 RX ORDER — MORPHINE SULFATE 10 MG/ML
4 INJECTION, SOLUTION INTRAMUSCULAR; INTRAVENOUS
Status: DISCONTINUED | OUTPATIENT
Start: 2017-03-08 | End: 2017-03-09

## 2017-03-08 RX ORDER — SODIUM CHLORIDE, SODIUM GLUCONATE, SODIUM ACETATE, POTASSIUM CHLORIDE AND MAGNESIUM CHLORIDE 526; 502; 368; 37; 30 MG/100ML; MG/100ML; MG/100ML; MG/100ML; MG/100ML
INJECTION, SOLUTION INTRAVENOUS PRN
Status: DISCONTINUED | OUTPATIENT
Start: 2017-03-08 | End: 2017-03-12 | Stop reason: HOSPADM

## 2017-03-08 RX ORDER — AMOXICILLIN 250 MG
1 CAPSULE ORAL NIGHTLY
Status: DISCONTINUED | OUTPATIENT
Start: 2017-03-08 | End: 2017-03-12 | Stop reason: HOSPADM

## 2017-03-08 RX ORDER — PROMETHAZINE HYDROCHLORIDE 25 MG/1
25 SUPPOSITORY RECTAL EVERY 6 HOURS PRN
Status: DISCONTINUED | OUTPATIENT
Start: 2017-03-08 | End: 2017-03-12 | Stop reason: HOSPADM

## 2017-03-08 RX ORDER — ENEMA 19; 7 G/133ML; G/133ML
1 ENEMA RECTAL
Status: DISCONTINUED | OUTPATIENT
Start: 2017-03-08 | End: 2017-03-08

## 2017-03-08 RX ORDER — LACTULOSE 20 G/30ML
30 SOLUTION ORAL
Status: DISCONTINUED | OUTPATIENT
Start: 2017-03-08 | End: 2017-03-12 | Stop reason: HOSPADM

## 2017-03-08 RX ORDER — LIDOCAINE HYDROCHLORIDE 10 MG/ML
1-2 INJECTION, SOLUTION INFILTRATION; PERINEURAL
Status: DISCONTINUED | OUTPATIENT
Start: 2017-03-08 | End: 2017-03-08

## 2017-03-08 RX ORDER — ACETAMINOPHEN 650 MG/1
650 SUPPOSITORY RECTAL EVERY 4 HOURS PRN
Status: DISCONTINUED | OUTPATIENT
Start: 2017-03-08 | End: 2017-03-12 | Stop reason: HOSPADM

## 2017-03-08 RX ORDER — HYDROCODONE BITARTRATE AND ACETAMINOPHEN 5; 325 MG/1; MG/1
1-2 TABLET ORAL EVERY 4 HOURS PRN
Status: DISCONTINUED | OUTPATIENT
Start: 2017-03-08 | End: 2017-03-12 | Stop reason: HOSPADM

## 2017-03-08 RX ORDER — ALBUTEROL SULFATE 90 UG/1
2 AEROSOL, METERED RESPIRATORY (INHALATION) EVERY 6 HOURS PRN
Status: DISCONTINUED | OUTPATIENT
Start: 2017-03-08 | End: 2017-03-12 | Stop reason: HOSPADM

## 2017-03-08 RX ORDER — POTASSIUM CHLORIDE 14.9 MG/ML
20 INJECTION INTRAVENOUS ONCE
Status: COMPLETED | OUTPATIENT
Start: 2017-03-08 | End: 2017-03-08

## 2017-03-08 RX ORDER — BISACODYL 10 MG
10 SUPPOSITORY, RECTAL RECTAL
Status: DISCONTINUED | OUTPATIENT
Start: 2017-03-08 | End: 2017-03-08

## 2017-03-08 RX ORDER — POTASSIUM CHLORIDE 7.45 MG/ML
10 INJECTION INTRAVENOUS ONCE
Status: COMPLETED | OUTPATIENT
Start: 2017-03-08 | End: 2017-03-08

## 2017-03-08 RX ORDER — SODIUM CHLORIDE 9 MG/ML
INJECTION, SOLUTION INTRAVENOUS
Status: DISCONTINUED
Start: 2017-03-08 | End: 2017-03-08

## 2017-03-08 RX ORDER — LIDOCAINE HYDROCHLORIDE 10 MG/ML
INJECTION, SOLUTION INFILTRATION; PERINEURAL
Status: COMPLETED
Start: 2017-03-08 | End: 2017-03-08

## 2017-03-08 RX ORDER — AMOXICILLIN 250 MG
1 CAPSULE ORAL NIGHTLY
Status: DISCONTINUED | OUTPATIENT
Start: 2017-03-08 | End: 2017-03-08

## 2017-03-08 RX ORDER — BISACODYL 10 MG
10 SUPPOSITORY, RECTAL RECTAL
Status: DISCONTINUED | OUTPATIENT
Start: 2017-03-08 | End: 2017-03-12 | Stop reason: HOSPADM

## 2017-03-08 RX ORDER — ACETAMINOPHEN 325 MG/1
650 TABLET ORAL EVERY 4 HOURS PRN
Status: DISCONTINUED | OUTPATIENT
Start: 2017-03-08 | End: 2017-03-12 | Stop reason: HOSPADM

## 2017-03-08 RX ORDER — LACTULOSE 20 G/30ML
30 SOLUTION ORAL
Status: DISCONTINUED | OUTPATIENT
Start: 2017-03-08 | End: 2017-03-08

## 2017-03-08 RX ORDER — LIDOCAINE HYDROCHLORIDE 10 MG/ML
1-2 INJECTION, SOLUTION INFILTRATION; PERINEURAL
Status: DISCONTINUED | OUTPATIENT
Start: 2017-03-08 | End: 2017-03-09

## 2017-03-08 RX ORDER — NITROGLYCERIN 20 MG/100ML
0-100 INJECTION INTRAVENOUS CONTINUOUS
Status: DISCONTINUED | OUTPATIENT
Start: 2017-03-08 | End: 2017-03-09

## 2017-03-08 RX ORDER — TRAMADOL HYDROCHLORIDE 50 MG/1
50 TABLET ORAL EVERY 4 HOURS PRN
Status: DISCONTINUED | OUTPATIENT
Start: 2017-03-08 | End: 2017-03-12 | Stop reason: HOSPADM

## 2017-03-08 RX ORDER — ALUMINA, MAGNESIA, AND SIMETHICONE 2400; 2400; 240 MG/30ML; MG/30ML; MG/30ML
30 SUSPENSION ORAL EVERY 4 HOURS PRN
Status: DISCONTINUED | OUTPATIENT
Start: 2017-03-08 | End: 2017-03-09

## 2017-03-08 RX ORDER — ENEMA 19; 7 G/133ML; G/133ML
1 ENEMA RECTAL
Status: DISCONTINUED | OUTPATIENT
Start: 2017-03-08 | End: 2017-03-12 | Stop reason: HOSPADM

## 2017-03-08 RX ORDER — OXYCODONE HYDROCHLORIDE 10 MG/1
10 TABLET ORAL
Status: DISCONTINUED | OUTPATIENT
Start: 2017-03-08 | End: 2017-03-12 | Stop reason: HOSPADM

## 2017-03-08 RX ADMIN — POTASSIUM CHLORIDE 10 MEQ: 7.46 INJECTION, SOLUTION INTRAVENOUS at 17:07

## 2017-03-08 RX ADMIN — DEXMEDETOMIDINE HYDROCHLORIDE 1 MCG/KG/HR: 100 INJECTION, SOLUTION INTRAVENOUS at 19:03

## 2017-03-08 RX ADMIN — MAGNESIUM SULFATE IN DEXTROSE 1 G: 10 INJECTION, SOLUTION INTRAVENOUS at 14:47

## 2017-03-08 RX ADMIN — ONDANSETRON 4 MG: 2 INJECTION, SOLUTION INTRAMUSCULAR; INTRAVENOUS at 20:53

## 2017-03-08 RX ADMIN — HYDROCODONE BITARTRATE AND ACETAMINOPHEN 2 TABLET: 5; 325 TABLET ORAL at 19:03

## 2017-03-08 RX ADMIN — OXYCODONE HYDROCHLORIDE 5 MG: 10 TABLET ORAL at 21:03

## 2017-03-08 RX ADMIN — CEFAZOLIN SODIUM 2 G: 2 INJECTION, SOLUTION INTRAVENOUS at 17:41

## 2017-03-08 RX ADMIN — LIDOCAINE HYDROCHLORIDE: 10 INJECTION, SOLUTION INFILTRATION; PERINEURAL at 07:10

## 2017-03-08 RX ADMIN — POTASSIUM CHLORIDE 20 MEQ: 14.9 INJECTION, SOLUTION INTRAVENOUS at 15:59

## 2017-03-08 RX ADMIN — HYDROCODONE BITARTRATE AND ACETAMINOPHEN 2 TABLET: 5; 325 TABLET ORAL at 23:13

## 2017-03-08 RX ADMIN — DEXMEDETOMIDINE HYDROCHLORIDE 0.4 MCG/KG/HR: 100 INJECTION, SOLUTION INTRAVENOUS at 14:43

## 2017-03-08 RX ADMIN — STANDARDIZED SENNA CONCENTRATE AND DOCUSATE SODIUM 1 TABLET: 8.6; 5 TABLET, FILM COATED ORAL at 22:03

## 2017-03-08 RX ADMIN — SODIUM CHLORIDE: 9 INJECTION, SOLUTION INTRAVENOUS at 15:33

## 2017-03-08 RX ADMIN — MORPHINE SULFATE 4 MG: 10 INJECTION INTRAVENOUS at 17:22

## 2017-03-08 RX ADMIN — SODIUM BICARBONATE 50 MEQ: 84 INJECTION, SOLUTION INTRAVENOUS at 15:01

## 2017-03-08 RX ADMIN — LIDOCAINE HYDROCHLORIDE 2 ML: 10 INJECTION, SOLUTION INFILTRATION; PERINEURAL at 17:23

## 2017-03-08 RX ADMIN — VANCOMYCIN HYDROCHLORIDE 1500 MG: 10 INJECTION, POWDER, LYOPHILIZED, FOR SOLUTION INTRAVENOUS at 20:16

## 2017-03-08 RX ADMIN — CLEVIDIPINE 1 MG/HR: 0.5 EMULSION INTRAVENOUS at 18:30

## 2017-03-08 RX ADMIN — DEXMEDETOMIDINE HYDROCHLORIDE 0.4 MCG/KG/HR: 100 INJECTION, SOLUTION INTRAVENOUS at 14:34

## 2017-03-08 RX ADMIN — MUPIROCIN 1 APPLICATION: 20 OINTMENT TOPICAL at 06:51

## 2017-03-08 RX ADMIN — INSULIN HUMAN 4 UNITS: 100 INJECTION, SOLUTION PARENTERAL at 15:21

## 2017-03-08 RX ADMIN — DEXMEDETOMIDINE HYDROCHLORIDE 0.6 MCG/KG/HR: 100 INJECTION, SOLUTION INTRAVENOUS at 21:30

## 2017-03-08 RX ADMIN — SODIUM CHLORIDE 2 UNITS/HR: 9 INJECTION, SOLUTION INTRAVENOUS at 15:31

## 2017-03-08 RX ADMIN — DEXMEDETOMIDINE HYDROCHLORIDE 1 MCG/KG/HR: 100 INJECTION, SOLUTION INTRAVENOUS at 17:26

## 2017-03-08 RX ADMIN — DIPHENHYDRAMINE HCL 25 MG: 25 TABLET ORAL at 23:14

## 2017-03-08 ASSESSMENT — PAIN SCALES - GENERAL
PAINLEVEL_OUTOF10: 9
PAINLEVEL_OUTOF10: 5
PAINLEVEL_OUTOF10: 7
PAINLEVEL_OUTOF10: 8
PAINLEVEL_OUTOF10: 8
PAINLEVEL_OUTOF10: 6
PAINLEVEL_OUTOF10: 8
PAINLEVEL_OUTOF10: 9

## 2017-03-08 ASSESSMENT — COPD QUESTIONNAIRES
DURING THE PAST 4 WEEKS HOW MUCH DID YOU FEEL SHORT OF BREATH: SOME OF THE TIME
DO YOU EVER COUGH UP ANY MUCUS OR PHLEGM?: NO/ONLY WITH OCCASIONAL COLDS OR INFECTIONS
HAVE YOU SMOKED AT LEAST 100 CIGARETTES IN YOUR ENTIRE LIFE: YES
COPD SCREENING SCORE: 5

## 2017-03-08 ASSESSMENT — LIFESTYLE VARIABLES
EVER_SMOKED: YES
ALCOHOL_USE: NO

## 2017-03-08 ASSESSMENT — PULMONARY FUNCTION TESTS: FVC: 1.3

## 2017-03-08 NOTE — IP AVS SNAPSHOT
" Home Care Instructions                                                                                                                  Name:Morales Olivier  Medical Record Number:[id number]  CSN: [id number]    YOB: 1955   Age: 61 y.o.  Sex: male  HT:1.778 m (5' 10\") WT: 122.2 kg (269 lb 6.4 oz)          Admit Date: 3/8/2017     Discharge Date:   Today's Date: 3/12/2017  Attending Doctor:  Cornelia Wright M.D.                  Allergies:  Review of patient's allergies indicates no known allergies.            Discharge Instructions       Discharge Instructions    Discharged to group home by medical transportation with escort. Discharged via wheelchair, hospital escort: Yes.  Special equipment needed: Oxygen    Be sure to schedule a follow-up appointment with your primary care doctor or any specialists as instructed.     Discharge Plan:   Influenza Vaccine Indication: Patient Refuses ([facility] patient; will address at first post-op visit)    I understand that a diet low in cholesterol, fat, and sodium is recommended for good health. Unless I have been given specific instructions below for another diet, I accept this instruction as my diet prescription.   Other diet: cardiac-diabetic    Special Instructions: heart surgery and coumadin    Cardiac Surgery Discharge Instructions/[facility]    Activity:  1. NO driving for 4 weeks after surgery. You may ride as a passenger.  2. NO lifting of any item over 10 lbs (e.g. gallon of milk) for 6 weeks after surgery.  Do not raise both arms above head, only one at a time.  Do not push or pull with your arms.  3. Walk as much as possible! Walk a minimum of 4 times per day. Depending on your fatigue and comfort level, you may walk as much as you wish. There is no maximum.  4. Other physical activities (sex, housework, gardening, etc.) are OK after 4 weeks or after 8 weeks if you want to golf (start by putting, then advance to chipping, then to driving).  5. Continue " using incentive spirometer for 2 weeks.  If you are going home on oxygen and you were not on oxygen prior to surgery, keep using until you are oxygen free.  6. Weigh daily and write it down starting  the next morning after you arrive home. Call your doctor for a weight gain of 2-4 lbs in 1-2 days.    Incision Care:  1. SHOWER EVERYDAY-no baths. Make sure to clean your incision(s) twice daily with plain, perfume and dye-free soap (if you shower in the morning, stand at the sink at bedtime and clean incision with soap and water). Then pat incision(s) dry with clean towel. Avoid creams or lotions on the incision(s).    2. If there is any increase in redness or swelling, or separation of the incision line, or thick drainage* from any of the incisions, call [facility] ([phone]). * Clear, thin drainage is not abnormal especially from the leg incision and/or chest tube sites.                    3. Continue to wear your RAYMOND Stockings for 4 weeks on the leg(s) with the incision(s) or swelling. You may take off the stocking(s) when in bed or when the leg(s) are elevated.    General Instructions:  1. If you are on the blood thinner Coumadin (warfarin), you will need your coumadin levels checked periodically.  2. You have been referred to Cardiac Rehab which is highly recommended for you after heart surgery. You can start Cardiac Rehab 30 days after surgery.  If you do not have an appointment at the time of discharge call [phone] to schedule an appointment.  3. Your Primary Care Doctor typically handles Home Oxygen. The Home Oxygen service that drops off your tanks should be checking your oxygen saturation levels. Oxygen may be stopped when you are > 90 % saturated on room air. Check with your Primary Care Doctor if you are unsure.    Prescription Refills/Questions:   Call your usual Pharmacy for ALL refills   1. Pain medication questions only: Call [facility] at [phone].  (Remember that refills  may require additional physician approval and will need at least 24 hours’ notice. Please call for refills on Mondays through Fridays from 9 am to 4 pm).  2. For all other medications (except pain medication): Call your Cardiology Group or Primary Care Doctor (not [facility]) for refills or questions.    [facility] IS ALWAYS AVAILABLE TO ANSWER YOUR QUESTIONS. DO NOT HESITATE TO CALL!    · Is patient discharged on Warfarin / Coumadin?   Yes    You are on the blood thinner Coumadin (warfarin) and you will need your coumadin levels checked periodically. For any questions call the [facility] @ [phone]. The home health nurse will draw your blood and the coumadin clinic will call with any change to your dose.      IMPORTANT: HOW TO USE THIS INFORMATION:  This is a summary and does NOT have all possible information about this product. This information does not assure that this product is safe, effective, or appropriate for you. This information is not individual medical advice and does not substitute for the advice of your health care professional. Always ask your health care professional for complete information about this product and your specific health needs.      WARFARIN - ORAL (WARF-uh-rin)      COMMON BRAND NAME(S): Coumadin      WARNING:  Warfarin can cause very serious (possibly fatal) bleeding. This is more likely to occur when you first start taking this medication or if you take too much warfarin. To decrease your risk for bleeding, your doctor or other health care provider will monitor you closely and check your lab results (INR test) to make sure you are not taking too much warfarin. Keep all medical and laboratory appointments. Tell your doctor right away if you notice any signs of serious bleeding. See also Side Effects section.      USES:  This medication is used to treat blood clots (such as in deep vein thrombosis-DVT or pulmonary embolus-PE) and/or to prevent new  "clots from forming in your body. Preventing harmful blood clots helps to reduce the risk of a stroke or heart attack. Conditions that increase your risk of developing blood clots include a certain type of irregular heart rhythm (atrial fibrillation), heart valve replacement, recent heart attack, and certain surgeries (such as hip/knee replacement). Warfarin is commonly called a \"blood thinner,\" but the more correct term is \"anticoagulant.\" It helps to keep blood flowing smoothly in your body by decreasing the amount of certain substances (clotting proteins) in your blood.      HOW TO USE:  Read the Medication Guide provided by your pharmacist before you start taking warfarin and each time you get a refill. If you have any questions, ask your doctor or pharmacist. Take this medication by mouth with or without food as directed by your doctor or other health care professional, usually once a day. It is very important to take it exactly as directed. Do not increase the dose, take it more frequently, or stop using it unless directed by your doctor. Dosage is based on your medical condition, laboratory tests (such as INR), and response to treatment. Your doctor or other health care provider will monitor you closely while you are taking this medication to determine the right dose for you. Use this medication regularly to get the most benefit from it. To help you remember, take it at the same time each day. It is important to eat a balanced, consistent diet while taking warfarin. Some foods can affect how warfarin works in your body and may affect your treatment and dose. Avoid sudden large increases or decreases in your intake of foods high in vitamin K (such as broccoli, cauliflower, cabbage, brussels sprouts, kale, spinach, and other green leafy vegetables, liver, green tea, certain vitamin supplements). If you are trying to lose weight, check with your doctor before you try to go on a diet. Cranberry products may also " affect how your warfarin works. Limit the amount of cranberry juice (16 ounces/480 milliliters a day) or other cranberry products you may drink or eat.      SIDE EFFECTS:  Nausea, loss of appetite, or stomach/abdominal pain may occur. If any of these effects persist or worsen, tell your doctor or pharmacist promptly. Remember that your doctor has prescribed this medication because he or she has judged that the benefit to you is greater than the risk of side effects. Many people using this medication do not have serious side effects. This medication can cause serious bleeding if it affects your blood clotting proteins too much (shown by unusually high INR lab results). Even if your doctor stops your medication, this risk of bleeding can continue for up to a week. Tell your doctor right away if you have any signs of serious bleeding, including: unusual pain/swelling/discomfort, unusual/easy bruising, prolonged bleeding from cuts or gums, persistent/frequent nosebleeds, unusually heavy/prolonged menstrual flow, pink/dark urine, coughing up blood, vomit that is bloody or looks like coffee grounds, severe headache, dizziness/fainting, unusual or persistent tiredness/weakness, bloody/black/tarry stools, chest pain, shortness of breath, difficulty swallowing. Tell your doctor right away if any of these unlikely but serious side effects occur: persistent nausea/vomiting, severe stomach/abdominal pain, yellowing eyes/skin. This drug rarely has caused very serious (possibly fatal) problems if its effects lead to small blood clots (usually at the beginning of treatment). This can lead to severe skin/tissue damage that may require surgery or amputation if left untreated. Patients with certain blood conditions (protein C or S deficiency) may be at greater risk. Get medical help right away if any of these rare but serious side effects occur: painful/red/purplish patches on the skin (such as on the toe, breast, abdomen), change in  the amount of urine, vision changes, confusion, slurred speech, weakness on one side of the body. A very serious allergic reaction to this drug is rare. However, get medical help right away if you notice any symptoms of a serious allergic reaction, including: rash, itching/swelling (especially of the face/tongue/throat), severe dizziness, trouble breathing. This is not a complete list of possible side effects. If you notice other effects not listed above, contact your doctor or pharmacist. In the [place] - Call your doctor for medical advice about side effects. You may report side effects to FDA at 3-997-OVL-2419. In [place] - Call your doctor for medical advice about side effects. You may report side effects to [facility] [place] at [phone].      PRECAUTIONS:  Before taking warfarin, tell your doctor or pharmacist if you are allergic to it; or if you have any other allergies. This product may contain inactive ingredients, which can cause allergic reactions or other problems. Talk to your pharmacist for more details. Before using this medication, tell your doctor or pharmacist your medical history, especially of: blood disorders (such as anemia, hemophilia), bleeding problems (such as bleeding of the stomach/intestines, bleeding in the brain), blood vessel disorders (such as aneurysms), recent major injury/surgery, liver disease, alcohol use, mental/mood disorders (including memory problems), frequent falls/injuries. It is important that all your doctors and dentists know that you take warfarin. Before having surgery or any medical/dental procedures, tell your doctor or dentist that you are taking this medication and about all the products you use (including prescription drugs, nonprescription drugs, and herbal products). Avoid getting injections into the muscles. If you must have an injection into a muscle (for example, a flu shot), it should be given in the arm. This way, it will be easier to check for bleeding and/or  apply pressure bandages. This medication may cause stomach bleeding. Daily use of alcohol while using this medicine will increase your risk for stomach bleeding and may also affect how this medication works. Limit or avoid alcoholic beverages. If you have not been eating well, if you have an illness or infection that causes fever, vomiting, or diarrhea for more than 2 days, or if you start using any antibiotic medications, contact your doctor or pharmacist immediately because these conditions can affect how warfarin works. This medication can cause heavy bleeding. To lower the chance of getting cut, bruised, or injured, use great caution with sharp objects like safety razors and nail cutters. Use an electric razor when shaving and a soft toothbrush when brushing your teeth. Avoid activities such as contact sports. If you fall or injure yourself, especially if you hit your head, call your doctor immediately. Your doctor may need to check you. The Food & Drug Administration has stated that generic warfarin products are interchangeable. However, consult your doctor or pharmacist before switching warfarin products. Be careful not to take more than one medication that contains warfarin unless specifically directed by the doctor or health care provider who is monitoring your warfarin treatment. Older adults may be at greater risk for bleeding while using this drug. This medication is not recommended for use during pregnancy because of serious (possibly fatal) harm to an unborn baby. Discuss the use of reliable forms of birth control with your doctor. If you become pregnant or think you may be pregnant, tell your doctor immediately. If you are planning pregnancy, discuss a plan for managing your condition with your doctor before you become pregnant. Your doctor may switch the type of medication you use during pregnancy. Very small amounts of this medication may pass into breast milk but is unlikely to harm a nursing  "infant. Consult your doctor before breast-feeding.      DRUG INTERACTIONS:  Drug interactions may change how your medications work or increase your risk for serious side effects. This document does not contain all possible drug interactions. Keep a list of all the products you use (including prescription/nonprescription drugs and herbal products) and share it with your doctor and pharmacist. Do not start, stop, or change the dosage of any medicines without your doctor's approval. Warfarin interacts with many prescription, nonprescription, vitamin, and herbal products. This includes medications that are applied to the skin or inside the vagina or rectum. The interactions with warfarin usually result in an increase or decrease in the \"blood-thinning\" (anticoagulant) effect. Your doctor or other health care professional should closely monitor you to prevent serious bleeding or clotting problems. While taking warfarin, it is very important to tell your doctor or pharmacist of any changes in medications, vitamins, or herbal products that you are taking. Some products that may interact with this drug include: capecitabine, imatinib, mifepristone. Aspirin, aspirin-like drugs (salicylates), and nonsteroidal anti-inflammatory drugs (NSAIDs such as ibuprofen, naproxen, celecoxib) may have effects similar to warfarin. These drugs may increase the risk of bleeding problems if taken during treatment with warfarin. Carefully check all prescription/nonprescription product labels (including drugs applied to the skin such as pain-relieving creams) since the products may contain NSAIDs or salicylates. Talk to your doctor about using a different medication (such as acetaminophen) to treat pain/fever. Low-dose aspirin and related drugs (such as clopidogrel, ticlopidine) should be continued if prescribed by your doctor for specific medical reasons such as heart attack or stroke prevention. Consult your doctor or pharmacist for more " details. Many herbal products interact with warfarin. Tell your doctor before taking any herbal products, especially bromelains, coenzyme Q10, cranberry, danshen, dong quai, fenugreek, garlic, ginkgo biloba, ginseng, and Thong's wort, among others. This medication may interfere with a certain laboratory test to measure theophylline levels, possibly causing false test results. Make sure laboratory personnel and all your doctors know you use this drug.      OVERDOSE:  If overdose is suspected, contact a poison control center or emergency room immediately. [place] residents can call the [place] National Poison Hotline at 1-270.923.3249. [place] residents can call a provincial poison control center. Symptoms of overdose may include: bloody/black/tarry stools, pink/dark urine, unusual/prolonged bleeding.      NOTES:  Do not share this medication with others. Laboratory and/or medical tests (such as INR, complete blood count) must be performed periodically to monitor your progress or check for side effects. Consult your doctor for more details.      MISSED DOSE:  For the best possible benefit, do not miss any doses. If you do miss a dose and remember on the same day, take it as soon as you remember. If you remember on the next day, skip the missed dose and resume your usual dosing schedule. Do not double the dose to catch up because this could increase your risk for bleeding. Keep a record of missed doses to give to your doctor or pharmacist. Contact your doctor or pharmacist if you miss 2 or more doses in a row.      STORAGE:  Store at room temperature away from light and moisture. Do not store in the bathroom. Keep all medications away from children and pets. Do not flush medications down the toilet or pour them into a drain unless instructed to do so. Properly discard this product when it is  or no longer needed. Consult your pharmacist or local waste disposal company for more details about how to safely discard your  product.      MEDICAL ALERT:  Your condition and medication can cause complications in a medical emergency. For information about enrolling in MedicAlert, call 1-254.858.2068 ([place]) or 1-903.860.4084 ([place]).      Information last revised October 2010 Copyright(c) 2010 [employer]      · Is patient Post Blood Transfusion?  No    Depression / Suicide Risk    As you are discharged from this [facility] facility, it is important to learn how to keep safe from harming yourself.    Recognize the warning signs:  · Abrupt changes in personality, positive or negative- including increase in energy   · Giving away possessions  · Change in eating patterns- significant weight changes-  positive or negative  · Change in sleeping patterns- unable to sleep or sleeping all the time   · Unwillingness or inability to communicate  · Depression  · Unusual sadness, discouragement and loneliness  · Talk of wanting to die  · Neglect of personal appearance   · Rebelliousness- reckless behavior  · Withdrawal from people/activities they love  · Confusion- inability to concentrate     If you or a loved one observes any of these behaviors or has concerns about self-harm, here's what you can do:  · Talk about it- your feelings and reasons for harming yourself  · Remove any means that you might use to hurt yourself (examples: pills, rope, extension cords, firearm)  · Get professional help from the community (Mental Health, Substance Abuse, psychological counseling)  · Do not be alone:Call your Safe Contact- someone whom you trust who will be there for you.  · Call your local CRISIS HOTLINE 075-2817 or 462-013-2824  · Call your local Children's Mobile Crisis Response Team [place] (120) 654-0627 or www.[employer]  · Call the toll free National Suicide Prevention Hotlines   · National Suicide Prevention Lifeline 418-802-XZOW (0341)  · National Hope Line Network 800-SUICIDE (973-1094)        Your appointments     Mar 15, 2017  11:00 AM   HOSPITAL FOLLOW UP with Sumanth Yancey M.D.   [facility] (--)    [address], [address]  [place] [address]   [phone]            Apr 19, 2017 11:20 AM   FOLLOW UP with Sumanth Yancey M.D.   [facility] (--)    [address], [address]  [place] [address]   [phone]              Follow-up Information     1. Follow up with Cornelia Wright M.D. On 4/10/2017.    Specialty:  Cardiac Surgery    Why:  12:30 pm    Contact information    [id number]  [place] [address]  [phone]           Discharge Medication Instructions:    Below are the medications your physician expects you to take upon discharge:    Review all your home medications and newly ordered medications with your doctor and/or pharmacist. Follow medication instructions as directed by your doctor and/or pharmacist.    Please keep your medication list with you and share with your physician.               Medication List      START taking these medications        Instructions    amiodarone 400 MG tablet   Last time this was given:  400 mg on 3/12/2017  8:27 AM   Commonly known as:  PACERONE    Take 1 Tab by mouth every day.   Dose:  400 mg       aspirin 81 MG EC tablet   Last time this was given:  81 mg on 3/12/2017  8:25 AM    Take 1 Tab by mouth every day.   Dose:  81 mg       furosemide 40 MG Tabs   Commonly known as:  LASIX    Take 1 Tab by mouth every day.   Dose:  40 mg       hydrocodone-acetaminophen 5-325 MG Tabs per tablet   Last time this was given:  2 Tabs on 3/11/2017  8:04 PM   Commonly known as:  NORCO    Take 1-2 Tabs by mouth every 6 hours as needed.   Dose:  1-2 Tab       metformin 500 MG Tabs   Last time this was given:  500 mg on 3/12/2017  8:24 AM   Commonly known as:  GLUCOPHAGE    Take 1 Tab by mouth 2 times a day, with meals.   Dose:  500 mg       potassium chloride SA 20 MEQ Tbcr   Last time this was given:  20 mEq on 3/12/2017   8:28 AM   Commonly known as:  Kdur    Take 1 Tab by mouth every day.   Dose:  20 mEq       warfarin 7.5 MG Tabs   Last time this was given:  7.5 mg on 3/11/2017  6:00 PM   Commonly known as:  COUMADIN    Take 1 Tab by mouth every day. Titrate to INR 2-3.   Dose:  7.5 mg         CONTINUE taking these medications        Instructions    albuterol 108 (90 BASE) MCG/ACT Aers inhalation aerosol   Last time this was given:  2 Puffs on 3/11/2017  1:04 PM    Inhale 2 Puffs by mouth every 6 hours as needed for Shortness of Breath.   Dose:  2 Puff       metoprolol 25 MG Tabs   Last time this was given:  25 mg on 3/12/2017  8:26 AM   Commonly known as:  LOPRESSOR    Take 1 Tab by mouth 2 Times a Day.   Dose:  25 mg       tamsulosin 0.4 MG capsule   Last time this was given:  0.4 mg on 3/12/2017  8:28 AM   Commonly known as:  FLOMAX    Take 0.4 mg by mouth ONE-HALF HOUR AFTER BREAKFAST.   Dose:  0.4 mg               Instructions           Diet / Nutrition:    Follow any diet instructions given to you by your doctor or the dietician, including how much salt (sodium) you are allowed each day.    If you are overweight, talk to your doctor about a weight reduction plan.    Activity:    Remain physically active following your doctor's instructions about exercise and activity.    Rest often.     Any time you become even a little tired or short of breath, SIT DOWN and rest.    Worsening Symptoms:    Report any of the following signs and symptoms to the doctor's office immediately:    *Pain of jaw, arm, or neck  *Chest pain not relieved by medication                               *Dizziness or loss of consciousness  *Difficulty breathing even when at rest   *More tired than usual                                       *Bleeding drainage or swelling of surgical site  *Swelling of feet, ankles, legs or stomach                 *Fever (>100ºF)  *Pink or blood tinged sputum  *Weight gain (3lbs/day or 5lbs /week)           *Shock from internal  defibrillator (if applicable)  *Palpitations or irregular heartbeats                *Cool and/or numb extremities    Stroke Awareness    Common Risk Factors for Stroke include:    Age  Atrial Fibrillation  Carotid Artery Stenosis  Diabetes Mellitus  Excessive alcohol consumption  High blood pressure  Overweight   Physical inactivity  Smoking    Warning signs and symptoms of a stroke include:    *Sudden numbness or weakness of the face, arm or leg (especially on one side of the body).  *Sudden confusion, trouble speaking or understanding.  *Sudden trouble seeing in one or both eyes.  *Sudden trouble walking, dizziness, loss of balance or coordination.Sudden severe headache with no known cause.    It is very important to get treatment quickly when a stroke occurs. If you experience any of the above warning signs, call 911 immediately.                   Disclaimer         Quit Smoking / Tobacco Use:    I understand the use of any tobacco products increases my chance of suffering from future heart disease or stroke and could cause other illnesses which may shorten my life. Quitting the use of tobacco products is the single most important thing I can do to improve my health. For further information on smoking / tobacco cessation call a Toll Free Quit Line at 1-837.407.9515 (*[employer] [place]) or 1-377.475.6471 (American Lung Association) or you can access the web based program at www.lungusa.org.    [place] Tobacco Users Help Line:  (592) 370-6331       Toll Free: 1-188.122.9075  Quit Tobacco Program [facility] (448)734-2430    Crisis Hotline:    [place] Crisis Hotline:  7-467-TQTNNQY or 1-190.194.9893    [place] Crisis Hotline:    1-962.227.7225 or 889-925-9899    Discharge Survey:   Thank you for choosing [facility]. We hope we did everything we could to make your hospital stay a pleasant one. You may be receiving a phone survey and we would appreciate your time and participation in  answering the questions. Your input is very valuable to us in our efforts to improve our service to our patients and their families.        My signature on this form indicates that:    1. I have reviewed and understand the above information.  2. My questions regarding this information have been answered to my satisfaction.  3. I have formulated a plan with my discharge nurse to obtain my prescribed medications for home.                  Disclaimer         __________________________________                     __________       ________                       Patient Signature                                                 Date                    Time

## 2017-03-08 NOTE — IP AVS SNAPSHOT
3/12/2017          Morales Olivier  [address] [place] [address] [address]  [address]  [place] [address]    Dear Morales:    [facility] wants to ensure your discharge home is safe and you or your loved ones have had all your questions answered regarding your care after you leave the hospital.    You may receive a telephone call within two days of your discharge.  This call is to make certain you understand your discharge instructions as well as ensure we provided you with the best care possible during your stay with us.     The call will only last approximately 3-5 minutes and will be done by a nurse.    Once again, we want to ensure your discharge home is safe and that you have a clear understanding of any next steps in your care.  If you have any questions or concerns, please do not hesitate to contact us, we are here for you.  Thank you for choosing [facility] for your healthcare needs.    Sincerely,    Maninder Reveles    [facility] [place]

## 2017-03-08 NOTE — OR SURGEON
Immediate Post-Operative Note      PreOp Diagnosis: Prosthetic MS, s/p MVR    PostOp Diagnosis: Prosthetic MS, s/p MVR    Procedure(s):  REDO MVR (29mm Medtronic Mosaic porcine valve)  KD     Surgeon(s):  Cornelia Wright M.D.    Assistant:  MILANA Mcdonald    Anesthesiologist/Type of Anesthesia:  Anesthesiologist: Gustavo Ball M.D.  Anesthesia Technician: Francisco Ardon; Paulo Bass/General    Surgical Staff:  Circulator: Ling Alberts R.N.; Ana Garner R.N.  Perfusionist: Kina Marie Circulator: Karena Weaver R.N.  Relief Scrub: Juanita Whitmore  Scrub Person: RENE Salomon IV; Daria Hernandez    Specimen: Explanted valve    Estimated Blood Loss: Min    Findings: Prosthetic MS    Complications: None        3/8/2017 1:32 PM Cornelia Wright

## 2017-03-08 NOTE — IP AVS SNAPSHOT
" <p align=\"LEFT\"><IMG SRC=\"//[email or web address]$/[email or web address]\" alt=\"Image\" WIDTH=\"50%\" HEIGHT=\"200\" BORDER=\"\"></p>                   Name:Morales Olivier  Medical Record Number:[id number]  CSN: [id number]    YOB: 1955   Age: 61 y.o.  Sex: male  HT:1.778 m (5' 10\") WT: 122.2 kg (269 lb 6.4 oz)          Admit Date: 3/8/2017     Discharge Date:   Today's Date: 3/12/2017  Attending Doctor:  Cornelia Wright M.D.                  Allergies:  Review of patient's allergies indicates no known allergies.          Your appointments     Mar 15, 2017 11:00 AM   HOSPITAL FOLLOW UP with Sumanth Yancey M.D.   [facility] (--)    [address] , [facility] [address]  [place] [address]   [phone]            Apr 19, 2017 11:20 AM   FOLLOW UP with Sumanth Yancey M.D.   [facility] (--)    [address], [facility] [address]  [place] [address] [phone]              Follow-up Information     1. Follow up with Cornelia Wright M.D. On 4/10/2017.    Specialty:  Cardiac Surgery    Why:  12:30 pm    Contact information    [address]  [place] [address]  [phone]           Medication List      Take these Medications        Instructions    albuterol 108 (90 BASE) MCG/ACT Aers inhalation aerosol    Inhale 2 Puffs by mouth every 6 hours as needed for Shortness of Breath.   Dose:  2 Puff       amiodarone 400 MG tablet   Commonly known as:  PACERONE    Take 1 Tab by mouth every day.   Dose:  400 mg       aspirin 81 MG EC tablet    Take 1 Tab by mouth every day.   Dose:  81 mg       furosemide 40 MG Tabs   Commonly known as:  LASIX    Take 1 Tab by mouth every day.   Dose:  40 mg       hydrocodone-acetaminophen 5-325 MG Tabs per tablet   Commonly known as:  NORCO    Take 1-2 Tabs by mouth every 6 hours as needed.   Dose:  1-2 Tab       metformin 500 MG Tabs   Commonly known as:  GLUCOPHAGE    Take 1 Tab by mouth 2 times a day, with meals.   Dose:  500 mg      " metoprolol 25 MG Tabs   Commonly known as:  LOPRESSOR    Take 1 Tab by mouth 2 Times a Day.   Dose:  25 mg       potassium chloride SA 20 MEQ Tbcr   Commonly known as:  Kdur    Take 1 Tab by mouth every day.   Dose:  20 mEq       tamsulosin 0.4 MG capsule   Commonly known as:  FLOMAX    Take 0.4 mg by mouth ONE-HALF HOUR AFTER BREAKFAST.   Dose:  0.4 mg       warfarin 7.5 MG Tabs   Commonly known as:  COUMADIN    Take 1 Tab by mouth every day. Titrate to INR 2-3.   Dose:  7.5 mg

## 2017-03-08 NOTE — IP AVS SNAPSHOT
SKY MobileMedia Access Code: Activation code not generated  Current [employer] Mobile[employer] Status: Patient Declined    Your email address is not on file at [facility].  Email Addresses are required for you to sign up for [employer] Mobile[employer], please contact [phone] to verify your personal information and to provide your email address prior to attempting to register for [employer].    Morales Olivier  [address] [place] [address] [address]  [address]  [place] [address]    HobbyTalkt  A secure, online tool to manage your health information     [facility]’s SKY MobileMedia® is a secure, online tool that connects you to your personalized health information from the privacy of your home -- day or night - making it very easy for you to manage your healthcare. Once the activation process is completed, you can even access your medical information using the SKY MobileMedia moreno, which is available for free in the Apple Moreno store or Google Play store.     To learn more about [employer] Mobile[employer], visit [email or web address]    There are two levels of access available (as shown below):   My Chart Features  [facility] Primary Care Doctor [facility]  Specialists [facility]  Urgent  Care Non-[facility] Primary Care Doctor   Email your healthcare team securely and privately 24/7 X X X    Manage appointments: schedule your next appointment; view details of past/upcoming appointments X      Request prescription refills. X      View recent personal medical records, including lab and immunizations X X X X   View health record, including health history, allergies, medications X X X X   Read reports about your outpatient visits, procedures, consult and ER notes X X X X   See your discharge summary, which is a recap of your hospital and/or ER visit that includes your diagnosis, lab results, and care plan X X  X     How to register for HobbyTalkt:  Once your e-mail address has been verified, follow the following steps to sign up for HobbyTalk[employer].     1. Go to  [email or web address]  2. Click on the Sign Up Now box, which takes you to the  New Member Sign Up page. You will need to provide the following information:  a. Enter your [employer] Access Code exactly as it appears at the top of this page. (You will not need to use this code after you’ve completed the sign-up process. If you do not sign up before the expiration date, you must request a new code.)   b. Enter your date of birth.   c. Enter your home email address.   d. Click Submit, and follow the next screen’s instructions.  3. Create a [employer]box ID. This will be your Bio[employer]box login ID and cannot be changed, so think of one that is secure and easy to remember.  4. Create a [employer] password. You can change your password at any time.  5. Enter your Password Reset Question and Answer. This can be used at a later time if you forget your password.   6. Enter your e-mail address. This allows you to receive e-mail notifications when new information is available in [employer].  7. Click Sign Up. You can now view your health information.    For assistance activating your Bio[employer] account, call [phone]

## 2017-03-08 NOTE — FACE TO FACE
Face to Face Supporting Documentation - Home Health    The encounter with this patient was in whole or in part the primary reason for home health admission.    Date of encounter:   Patient:                    MRN:                       YOB: 2018  Morales Olivier  [id number]  1955     Home health to see patient for:  Skilled Nursing care for assessment, interventions & education    Skilled need for:  Surgical Aftercare wound care, vital signs    Skilled nursing interventions to include:  Wound Care    Homebound status evidenced by:  Needs the assistance of another person in order to leave the home. Leaving home requires a considerable and taxing effort. There is a normal inability to leave the home.    Community Physician to provide follow up care: Nelia Huber M.D.     Optional Interventions? No      I certify the face to face encounter for this home health care referral meets the CMS requirements and the encounter/clinical assessment with the patient was, in whole, or in part, for the medical condition(s) listed above, which is the primary reason for home health care. Based on my clinical findings: the service(s) are medically necessary, support the need for home health care, and the homebound criteria are met.  I certify that this patient has had a face to face encounter by myself.  CON Anne. - NPI: [id number]

## 2017-03-09 ENCOUNTER — APPOINTMENT (OUTPATIENT)
Dept: RADIOLOGY | Facility: MEDICAL CENTER | Age: 62
DRG: 219 | End: 2017-03-09
Attending: NURSE PRACTITIONER
Payer: MEDICARE

## 2017-03-09 LAB
ANION GAP SERPL CALC-SCNC: 9 MMOL/L (ref 0–11.9)
BASOPHILS # BLD AUTO: 0.2 % (ref 0–1.8)
BASOPHILS # BLD: 0.02 K/UL (ref 0–0.12)
BUN SERPL-MCNC: 21 MG/DL (ref 8–22)
CALCIUM SERPL-MCNC: 7.8 MG/DL (ref 8.5–10.5)
CHLORIDE SERPL-SCNC: 109 MMOL/L (ref 96–112)
CO2 SERPL-SCNC: 23 MMOL/L (ref 20–33)
CREAT SERPL-MCNC: 1.28 MG/DL (ref 0.5–1.4)
EKG IMPRESSION: NORMAL
EOSINOPHIL # BLD AUTO: 0 K/UL (ref 0–0.51)
EOSINOPHIL NFR BLD: 0 % (ref 0–6.9)
ERYTHROCYTE [DISTWIDTH] IN BLOOD BY AUTOMATED COUNT: 51 FL (ref 35.9–50)
GFR SERPL CREATININE-BSD FRML MDRD: 57 ML/MIN/1.73 M 2
GLUCOSE BLD-MCNC: 101 MG/DL (ref 65–99)
GLUCOSE BLD-MCNC: 112 MG/DL (ref 65–99)
GLUCOSE BLD-MCNC: 115 MG/DL (ref 65–99)
GLUCOSE BLD-MCNC: 115 MG/DL (ref 65–99)
GLUCOSE BLD-MCNC: 116 MG/DL (ref 65–99)
GLUCOSE BLD-MCNC: 118 MG/DL (ref 65–99)
GLUCOSE BLD-MCNC: 120 MG/DL (ref 65–99)
GLUCOSE BLD-MCNC: 122 MG/DL (ref 65–99)
GLUCOSE BLD-MCNC: 125 MG/DL (ref 65–99)
GLUCOSE BLD-MCNC: 125 MG/DL (ref 65–99)
GLUCOSE BLD-MCNC: 134 MG/DL (ref 65–99)
GLUCOSE BLD-MCNC: 137 MG/DL (ref 65–99)
GLUCOSE BLD-MCNC: 159 MG/DL (ref 65–99)
GLUCOSE BLD-MCNC: 176 MG/DL (ref 65–99)
GLUCOSE BLD-MCNC: 185 MG/DL (ref 65–99)
GLUCOSE BLD-MCNC: 185 MG/DL (ref 65–99)
GLUCOSE BLD-MCNC: 190 MG/DL (ref 65–99)
GLUCOSE BLD-MCNC: 213 MG/DL (ref 65–99)
GLUCOSE BLD-MCNC: 53 MG/DL (ref 65–99)
GLUCOSE BLD-MCNC: 83 MG/DL (ref 65–99)
GLUCOSE BLD-MCNC: 95 MG/DL (ref 65–99)
GLUCOSE SERPL-MCNC: 121 MG/DL (ref 65–99)
HCT VFR BLD AUTO: 36.8 % (ref 42–52)
HGB BLD-MCNC: 11.6 G/DL (ref 14–18)
IMM GRANULOCYTES # BLD AUTO: 0.05 K/UL (ref 0–0.11)
IMM GRANULOCYTES NFR BLD AUTO: 0.4 % (ref 0–0.9)
INR PPP: 1.25 (ref 0.87–1.13)
LYMPHOCYTES # BLD AUTO: 0.42 K/UL (ref 1–4.8)
LYMPHOCYTES NFR BLD: 3.7 % (ref 22–41)
MCH RBC QN AUTO: 31.3 PG (ref 27–33)
MCHC RBC AUTO-ENTMCNC: 31.5 G/DL (ref 33.7–35.3)
MCV RBC AUTO: 99.2 FL (ref 81.4–97.8)
MONOCYTES # BLD AUTO: 0.88 K/UL (ref 0–0.85)
MONOCYTES NFR BLD AUTO: 7.7 % (ref 0–13.4)
NEUTROPHILS # BLD AUTO: 10.09 K/UL (ref 1.82–7.42)
NEUTROPHILS NFR BLD: 88 % (ref 44–72)
NRBC # BLD AUTO: 0 K/UL
NRBC BLD AUTO-RTO: 0 /100 WBC
PLATELET # BLD AUTO: 104 K/UL (ref 164–446)
PMV BLD AUTO: 10.3 FL (ref 9–12.9)
POTASSIUM SERPL-SCNC: 4.7 MMOL/L (ref 3.6–5.5)
POTASSIUM SERPL-SCNC: 4.7 MMOL/L (ref 3.6–5.5)
PROTHROMBIN TIME: 16.1 SEC (ref 12–14.6)
RBC # BLD AUTO: 3.71 M/UL (ref 4.7–6.1)
SODIUM SERPL-SCNC: 141 MMOL/L (ref 135–145)
WBC # BLD AUTO: 11.5 K/UL (ref 4.8–10.8)

## 2017-03-09 PROCEDURE — 93010 ELECTROCARDIOGRAM REPORT: CPT | Mod: 76 | Performed by: INTERNAL MEDICINE

## 2017-03-09 PROCEDURE — 700112 HCHG RX REV CODE 229: Performed by: NURSE PRACTITIONER

## 2017-03-09 PROCEDURE — 700102 HCHG RX REV CODE 250 W/ 637 OVERRIDE(OP): Performed by: NURSE PRACTITIONER

## 2017-03-09 PROCEDURE — 700102 HCHG RX REV CODE 250 W/ 637 OVERRIDE(OP): Performed by: THORACIC SURGERY (CARDIOTHORACIC VASCULAR SURGERY)

## 2017-03-09 PROCEDURE — 700101 HCHG RX REV CODE 250: Performed by: THORACIC SURGERY (CARDIOTHORACIC VASCULAR SURGERY)

## 2017-03-09 PROCEDURE — 80048 BASIC METABOLIC PNL TOTAL CA: CPT

## 2017-03-09 PROCEDURE — 85025 COMPLETE CBC W/AUTO DIFF WBC: CPT

## 2017-03-09 PROCEDURE — 99291 CRITICAL CARE FIRST HOUR: CPT | Performed by: INTERNAL MEDICINE

## 2017-03-09 PROCEDURE — 71010 DX-CHEST-PORTABLE (1 VIEW): CPT

## 2017-03-09 PROCEDURE — 93005 ELECTROCARDIOGRAM TRACING: CPT | Performed by: THORACIC SURGERY (CARDIOTHORACIC VASCULAR SURGERY)

## 2017-03-09 PROCEDURE — 93005 ELECTROCARDIOGRAM TRACING: CPT | Performed by: NURSE PRACTITIONER

## 2017-03-09 PROCEDURE — 770020 HCHG ROOM/CARE - TELE (206)

## 2017-03-09 PROCEDURE — 85610 PROTHROMBIN TIME: CPT

## 2017-03-09 PROCEDURE — 82962 GLUCOSE BLOOD TEST: CPT | Mod: 91

## 2017-03-09 PROCEDURE — A9270 NON-COVERED ITEM OR SERVICE: HCPCS | Performed by: NURSE PRACTITIONER

## 2017-03-09 PROCEDURE — 700111 HCHG RX REV CODE 636 W/ 250 OVERRIDE (IP): Performed by: NURSE PRACTITIONER

## 2017-03-09 PROCEDURE — 84132 ASSAY OF SERUM POTASSIUM: CPT

## 2017-03-09 RX ORDER — ALUMINA, MAGNESIA, AND SIMETHICONE 2400; 2400; 240 MG/30ML; MG/30ML; MG/30ML
10 SUSPENSION ORAL 4 TIMES DAILY PRN
Status: DISCONTINUED | OUTPATIENT
Start: 2017-03-09 | End: 2017-03-12 | Stop reason: HOSPADM

## 2017-03-09 RX ADMIN — DIPHENHYDRAMINE HCL 25 MG: 25 TABLET ORAL at 20:17

## 2017-03-09 RX ADMIN — ASPIRIN 81 MG: 81 TABLET ORAL at 08:18

## 2017-03-09 RX ADMIN — HYDROCODONE BITARTRATE AND ACETAMINOPHEN 2 TABLET: 5; 325 TABLET ORAL at 11:40

## 2017-03-09 RX ADMIN — DOCUSATE SODIUM 100 MG: 100 CAPSULE ORAL at 08:18

## 2017-03-09 RX ADMIN — STANDARDIZED SENNA CONCENTRATE AND DOCUSATE SODIUM 1 TABLET: 8.6; 5 TABLET, FILM COATED ORAL at 20:18

## 2017-03-09 RX ADMIN — ALUMINUM HYDROXIDE, MAGNESIUM HYDROXIDE,SIMETHICONE 30 ML: 400; 400; 40 LIQUID ORAL at 17:26

## 2017-03-09 RX ADMIN — HYDROCODONE BITARTRATE AND ACETAMINOPHEN 2 TABLET: 5; 325 TABLET ORAL at 21:28

## 2017-03-09 RX ADMIN — OXYCODONE HYDROCHLORIDE 10 MG: 10 TABLET ORAL at 14:19

## 2017-03-09 RX ADMIN — MAGNESIUM SULFATE IN DEXTROSE 1 G: 10 INJECTION, SOLUTION INTRAVENOUS at 14:19

## 2017-03-09 RX ADMIN — INSULIN HUMAN 10 UNITS: 100 INJECTION, SUSPENSION SUBCUTANEOUS at 20:25

## 2017-03-09 RX ADMIN — TRAMADOL HYDROCHLORIDE 50 MG: 50 TABLET, COATED ORAL at 06:14

## 2017-03-09 RX ADMIN — DIPHENHYDRAMINE HCL 25 MG: 25 TABLET ORAL at 22:34

## 2017-03-09 RX ADMIN — OXYCODONE HYDROCHLORIDE 10 MG: 10 TABLET ORAL at 20:17

## 2017-03-09 RX ADMIN — ONDANSETRON 4 MG: 2 INJECTION, SOLUTION INTRAMUSCULAR; INTRAVENOUS at 08:51

## 2017-03-09 RX ADMIN — OXYCODONE HYDROCHLORIDE 10 MG: 10 TABLET ORAL at 03:12

## 2017-03-09 RX ADMIN — HYDROCODONE BITARTRATE AND ACETAMINOPHEN 2 TABLET: 5; 325 TABLET ORAL at 05:22

## 2017-03-09 RX ADMIN — TAMSULOSIN HYDROCHLORIDE 0.4 MG: 0.4 CAPSULE ORAL at 08:00

## 2017-03-09 RX ADMIN — OXYCODONE HYDROCHLORIDE 10 MG: 10 TABLET ORAL at 08:01

## 2017-03-09 RX ADMIN — DEXTROSE MONOHYDRATE 25 ML: 25 INJECTION, SOLUTION INTRAVENOUS at 01:11

## 2017-03-09 RX ADMIN — INSULIN LISPRO 7 UNITS: 100 INJECTION, SOLUTION INTRAVENOUS; SUBCUTANEOUS at 16:49

## 2017-03-09 RX ADMIN — INSULIN LISPRO 4 UNITS: 100 INJECTION, SOLUTION INTRAVENOUS; SUBCUTANEOUS at 20:27

## 2017-03-09 RX ADMIN — HYDROCODONE BITARTRATE AND ACETAMINOPHEN 2 TABLET: 5; 325 TABLET ORAL at 17:27

## 2017-03-09 ASSESSMENT — ENCOUNTER SYMPTOMS
PSYCHIATRIC NEGATIVE: 1
CONSTITUTIONAL NEGATIVE: 1
GASTROINTESTINAL NEGATIVE: 1
MUSCULOSKELETAL NEGATIVE: 1
CARDIOVASCULAR NEGATIVE: 1
RESPIRATORY NEGATIVE: 1
NEUROLOGICAL NEGATIVE: 1
EYES NEGATIVE: 1

## 2017-03-09 ASSESSMENT — PAIN SCALES - GENERAL
PAINLEVEL_OUTOF10: 5
PAINLEVEL_OUTOF10: 5
PAINLEVEL_OUTOF10: 9
PAINLEVEL_OUTOF10: 7
PAINLEVEL_OUTOF10: 8
PAINLEVEL_OUTOF10: 3
PAINLEVEL_OUTOF10: 4
PAINLEVEL_OUTOF10: 7
PAINLEVEL_OUTOF10: 9
PAINLEVEL_OUTOF10: 8
PAINLEVEL_OUTOF10: 7
PAINLEVEL_OUTOF10: 5
PAINLEVEL_OUTOF10: 8
PAINLEVEL_OUTOF10: 9

## 2017-03-09 NOTE — CARE PLAN
Problem: Post Op Day 1 CABG/Heart Valve Replacement  Goal: Optimal care of the post op CABG/heart valve replacement Post Op Day 1  Intervention: EKG and CXR completed  Completed.   Intervention: All valve patients: PT/INR daily  Completed.   Intervention: Antibiotics are discontinued within 24 hours of anesthesia end time unless indication documented for continuation beyond 24 hours  Completed   Intervention: Daily Weights  Completed.   Intervention: Up in chair for all meals  Completed.   Intervention: Ambulate in am if stable. First ambulation is 25 feet. Repeat x 3 as tolerated. Ambulate again before bed.  Pt ambulated to bathroom, tires quickly, 2 person max assist with front wheel walker.   Intervention: Discontinue grover catheter unless documented reason for continuation  Completed   Intervention: Remove original surgical dressing after 24 hrs, leave open to air unless otherwise specified by physician  completed  Intervention: IS q 1 hour while awake and record best IS volume  Best IS 2250  Intervention: Graduated elastic stockings  Pt wearing RAYMOND hose  Intervention: Saline lock IV  Completed once insulin gtt d/c'd.   Intervention: Transfer to tele status, begin VS q 4 hours  Pt Tele statues  Intervention: After 24th hour post-anesthesia end time, transition patient to Cardiac Surgery SQ Insulin Protocol  Will be completed at 1400.

## 2017-03-09 NOTE — CARE PLAN
Problem: Oxygenation:  Goal: Maintain adequate oxygenation dependent on patient condition  Outcome: PROGRESSING AS EXPECTED  PT resting on 1 L/m    Problem: Hyperinflation:  Goal: Prevent or improve atelectasis  Outcome: PROGRESSING AS EXPECTED  IS QID  IS values today 2100 to 2250  60% of predicted = 1860

## 2017-03-09 NOTE — PROGRESS NOTES
Patient extubated from Spont , 40 % FiO2, and 8 PEEP. FVC 1.3 L, NIF -28 cm H2O, RR 20, pInsp 5 cm H2O. See charted ABG at 1810    Total time intubated from reaching room 4 hours and 20 minutes.     Patient currently tolerating 6 L NC.

## 2017-03-09 NOTE — PROGRESS NOTES
Chest and abdomen xray ordered due to pt report of inability to catch breath, decreased lung sounds, and abdominal distention.  Per radiologist, imaging similar to baseline.    Pt mobilized to edge of bed 4h post extubation per protocol.  Tolerates well for 10 minutes.

## 2017-03-09 NOTE — CARE PLAN
Problem: Post Op Day 1 CABG/Heart Valve Replacement  Goal: Optimal care of the post op CABG/heart valve replacement Post Op Day 1  Outcome: PROGRESSING AS EXPECTED  Intervention: EKG and CXR completed  Done  Intervention: All valve patients: PT/INR daily  PT completed  Intervention: Antibiotics are discontinued within 24 hours of anesthesia end time unless indication documented for continuation beyond 24 hours  ABx discontinued  Intervention: Daily Weights  Weighed at stand up scale      Intervention: Up in chair for all meals  Up to chair for breakfast  Intervention: Ambulate in am if stable. First ambulation is 25 feet. Repeat x 3 as tolerated. Ambulate again before bed.  Ambulated ~5ft x 2  Intervention: IS q 1 hour while awake and record best IS volume  Frequent IS use encouraged; max 1750cc.  Intervention: Graduated elastic stockings  Willie hose in place  Intervention: Saline lock IV  Insulin drip running per protocol until 1400  Intervention: Transfer to tele status, begin VS q 4 hours  Transitioned to tele  Intervention: After 24th hour post-anesthesia end time, transition patient to Cardiac Surgery SQ Insulin Protocol  Orders in place to initiated at 1400.

## 2017-03-09 NOTE — OP REPORT
DATE OF SERVICE:  03/08/2017    REFERRING PHYSICIAN:  Dr. Yancey.    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    PROCEDURES PERFORMED:  Redo mitral valve replacement (29 mm Medtronic Mosaic   porcine valve) and intraoperative transesophageal echocardiography.    SURGEON:  Cornelia Wright MD    FIRST ASSISTANT:  MILANA Mcdonald    ANESTHESIOLOGIST:  Dr. Gustavo Ball.    ANESTHESIA:  General endotracheal.    DRAINS:  Mediastinal chest tubes x2 (32-Pashto straight and angled).    COMPLICATIONS:  None.    INDICATIONS FOR PROCEDURE:  The patient is a 61-year-old white male with   worsening shortness of breath.  He is status post mitral valve replacement   with resection of left atrial tumor and maze procedure in 2008.    Echocardiography shows severe prosthetic mitral stenosis.  His left   ventricular ejection fraction was approximately 45%.  Cardiac catheterization   did not show any hemodynamically significant coronary artery disease.    DESCRIPTION OF PROCEDURE:  The patient was brought to the operating room and   placed on the operating room table in the supine position.  After successful   induction of general anesthesia and endotracheal intubation, the  patient was   prepped and draped in the usual sterile fashion.  MILANA Mcdonald, assisted   with retraction during the operation and she closed the sternal wound.    Intraoperative transesophageal echocardiography showed the severe prosthetic   mitral valve stenosis.  There was mild tricuspid regurgitation.  His left   ventricular ejection fraction was approximately 40%-45%.  An incision was made   from the sternal notch to the xiphoid.  The sternal wires were removed and   oscillating type saw was used to transect the sternum longitudinally.  Careful   mediastinal dissection was performed.  The patient had very dense pericardial   adhesions and exposure was very difficult.  The pericardium was tented   anteriorly with Ethibond stay stitches.  Further sharp and blunt dissection   was used to isolate the aorta and the right atrial appendage remnant.  The   patient was systemically heparinized.  The aortic cannula was inserted first   followed by the dual-stage venous cannula.  An antegrade cardioplegia cannula   was placed in the ascending aorta.  Cardiopulmonary bypass was instituted.    Further intrapericardial dissection was performed to expose the right   pulmonary veins.  The aorta was cross-clamped and the patient was given 1 L of   cold cardioplegia in an antegrade fashion.  There was prompt cardiac arrest.    From this point on, cardioplegia was given in an antegrade fashion every 20   minutes while the aorta was cross-clamped.  A left atriotomy was performed   just below the interatrial groove.  The old pericardial valve was explanted.    There was significant scarring extending into the leaflets preventing them from   moving one of the leaflets was heavily calcified and had very restricted   motion.  The annulus was debrided with a rongeur.  Pledgeted #2-0 Ethibond   stitches were then placed around the mitral valve annulus in a horizontal   mattress fashion with the pledgets in a subannular position.  A  29 mm   Medtronic Mosaic porcine valve was then placed in the mitral valve annulus and   secured in place with the Ethibond stitches utilizing the Cor-Knot device.    The valve was properly positioned and there was free movement of all 3   bioprosthetic valve leaflets.  There was no mitral regurgitation upon testing   with cold saline.  Rewarming of the patient was initiated.  The left atriotomy   was closed in 2 layers using #4-0 Prolene sutures.  The aortic cross-clamp   was removed.  The aortic cross-clamp time was 87 minutes.  Total   cardiopulmonary bypass time was 131 minutes.  The left ventricle was deaired   in the usual fashion.  The carbon dioxide which had been released over the   operative field during the operation was discontinued.  The patient was   electrically cardioverted into a regular paced rhythm.  A straight and an   angled 32-Icelandic chest tubes were placed in mediastinum.  The carbon dioxide   which had been released over the operative field during the operation was   discontinued.  The antegrade cardioplegia cannula was removed.  When he was   adequately warmed, he was slowly taken off cardiopulmonary bypass, which he   tolerated well.  The dual-stage venous cannula was removed.  Protamine was   given to reverse the effects of heparin.  The aortic cannula was removed.    When adequate hemostasis had been obtained, the sternum was reapproximated   using size 5 sternal wires and the remainder of the incision was closed in 3   layers using Vicryl sutures.  Intraoperative transesophageal echocardiography   showed a properly positioned and functioning mitral valve bioprosthesis   without any paravalvular or central leaks.  The mean mitral valve gradient was   now only 3 mmHg.  His left ventricular ejection fraction remained the same at   approximately 40%-45%.  There were no apparent complications.  The patient   tolerated the procedure well and left the operating room in guarded condition.        ____________________________________     MD CHRISTINA RHOADES    DD:  03/08/2017 13:48:44  DT:  03/08/2017 16:30:30    D#:  [id number]  Job#:  [id number]    cc: _____ _____

## 2017-03-09 NOTE — PROGRESS NOTES
Pulmonary Critical Care Progress Note        Date of Service:  3/9/17  Chief Complaint: Dyspnea with exertion    History of Present Illness: 61 y.o. male admitted for elective replacement of a severely stenotic prosthetic mitral valve leading to RV dysfunction and moderate pulmonary hypertension.     ROS:  Respiratory: negative cough, negative hemoptysis, negative pleuritic chest pain and negative shortness of breath, Cardiac: positive chest pain, negative palpitations and negative leg swelling, GI: negative.  All other systems negative.    Interval Events:  24 hour interval history reviewed    - Extubated last night per protocol   - Having chest/sternal pain   - 100% paced to the 60s   - 300cc of bloody fluid to chest tube   - IS at 2100cc    PFSH:  No change.    Respiratory:     Pulse Oximetry: 96 %   Chest tube with 300cc serous sanguinous fluid    Exam: clear to auscultation without rales or wheezes  ImagingCXR  cardiomegaly with mild bilateral pulmonary edema noted   Recent Labs      03/08/17   1543  03/08/17   1645  03/08/17   1810   ISTATAPH  7.353*  7.370*  7.463   ISTATAPCO2  35.2  34.9  32.5   ISTATAPO2  58*  71  64   ISTATATCO2  21  21  24   [id number]  89*  94  94   ISTATARTHCO3  19.6  20.2  23.2   ISTATARTBE  -5*  -4  0   ISTATTEMP  36.9 C  37.2 C  37.8 C   ISTATFIO2  80  80  40   ISTATSPEC  Arterial  Arterial  Arterial   ISTATAPHTC  7.355*  7.367*  7.450   [id number]  57*  72  68       HemoDynamics:  Pulse: 60, Heart Rate (Monitored): 60  Blood Pressure: 102/91 mmHg, Arterial BP: 102/73 mmHg, NIBP: 114/78 mmHg       Exam: regular rate and rhythm  Imaging: Available data reviewed        Neuro:  GCS Total Jet Coma Score: 15       Exam: no focal deficits noted mental status intact Motor and sensory exam grossly intact follows commands, raises two fingers  Imaging: None - Reviewed    Fluids:  Intake/Output       03/07/17 0700 - 03/08/17 0659 (Not Admitted) 03/08/17 0700 - 03/09/17 0659 03/09/17 0700  - 03/10/17 0659      0700-1859 1900-0659 Total 0700-1859 1900-0659 Total [id number] 1900-0659 Total       Intake    P.O.  --  -- --  --  720 720  --  -- --    P.O. -- -- -- -- 720 720 -- -- --    I.V.  --  -- --  412  2176.5 2588.5  --  -- --    Clevidipine Volume -- -- -- -- 13.6 13.6 -- -- --    Precedex Volume -- -- -- 66.8 152.6 219.4 -- -- --    Norepinephrine Volume -- -- -- 2.1 -- 2.1 -- -- --    Insulin Volume -- -- -- 33.9 140.3 174.1 -- -- --    Epinephrine Volume -- -- -- 9.3 -- 9.3 -- -- --    IV Volume (OR Intake) -- -- -- -- 1300 1300 -- -- --    IV Volume (NS) -- -- -- -- 70 70 -- -- --    IV Piggyback Volume -- -- -- 300 500 800 -- -- --    Blood  --  -- --  200  -- 200  --  -- --    Platelets Total Volume (Non-Barcoded) -- -- -- 200 -- 200 -- -- --    Total Intake -- -- -- 612 2896.5 3508.5 -- -- --       Output    Urine  --  -- --  893  457 1350  --  -- --    Straight Catheter -- -- -- 393 382 850 -- -- --    Void (ml) -- -- -- 500 -- 500 -- -- --    Drains  --  -- --  196  262 458  --  -- --    Hemovac 1 -- -- -- 196 262 458 -- -- --    Total Output -- -- -- 0448 530 3731 -- -- --       Net I/O     -- -- -- -477 2177.5 1700.5 -- -- --        Weight: 117.8 kg (259 lb 11.2 oz)  Recent Labs      03/07/17 0930  03/08/17   1410  03/08/17   1800  03/09/17   0005   SODIUM  140   --    --    --    POTASSIUM  4.0   --   4.6  4.7   CHLORIDE  110   --    --    --    CO2  22   --    --    --    BUN  21   --    --    --    CREATININE  1.23   --    --    --    MAGNESIUM   --   2.9*   --    --    CALCIUM  9.3   --    --    --        GI/Nutrition:  Exam: abdomen is soft and non-tender, normal active bowel sounds  Imaging: Available data reviewed  taking PO  Liver Function  Recent Labs      03/07/17   0930   ALTSGPT  7   ASTSGOT  13   ALKPHOSPHAT  62   TBILIRUBIN  1.2   GLUCOSE  131*       Heme:  Recent Labs      03/07/17 0930 03/08/17   0710  03/08/17   1410   RBC  4.86   --    --    HEMOGLOBIN  15.4   --     --    HEMATOCRIT  45.9   --    --    PLATELETCT  160*   --   196   PROTHROMBTM  15.3*  14.7*  18.7*   APTT  27.1   --   28.1   INR  1.17*  1.11  1.51*       Infectious Disease:  Temp  Av.2 °C (99 °F)  Min: 36.9 °C (98.4 °F)  Max: 37.5 °C (99.5 °F)  Monitored Temp  Av.2 °C (99 °F)  Min: 36.6 °C (97.9 °F)  Max: 37.9 °C (100.2 °F)  Micro: reviewed  Recent Labs      17   0930   WBC  6.5   NEUTSPOLYS  77.90*   LYMPHOCYTES  11.70*   MONOCYTES  7.30   EOSINOPHILS  2.30   BASOPHILS  0.50   ASTSGOT  13   ALTSGPT  7   ALKPHOSPHAT  62   TBILIRUBIN  1.2     Current Facility-Administered Medications   Medication Dose Frequency Provider Last Rate Last Dose   • tamsulosin (FLOMAX) capsule 0.4 mg  0.4 mg AFTER BREAKFAST Linda Todd A.P.N.       • albuterol inhaler 2 Puff  2 Puff Q6HRS PRN Linda Todd A.P.N.       • Respiratory Care per Protocol   Continuous RT Linda Todd A.P.N.       • NS infusion   Continuous Linda Todd A.P.N. 10 mL/hr at 17 1533     • K+ Scale: Goal of 4.5  1 Each Q6HRS Linda Todd A.P.N.   Stopped at 17 1800   • Pharmacy Consult Request ...Pain Management Review 1 Each  1 Each PRN Linda Todd A.P.N.       • docusate sodium (COLACE) capsule 100 mg  100 mg QAM Linda Todd A.P.N.   Stopped at 17 1415    And   • senna-docusate (PERICOLACE or SENOKOT S) 8.6-50 MG per tablet 1 Tab  1 Tab Nightly Linad Todd A.P.N.   1 Tab at 17 2203    And   • senna-docusate (PERICOLACE or SENOKOT S) 8.6-50 MG per tablet 1 Tab  1 Tab Q24HRS PRN Linda M White, A.P.N.        And   • lactulose 20 GM/30ML solution 30 mL  30 mL Q24HRS PRN Linda Todd, A.P.N.        And   • bisacodyl (DULCOLAX) suppository 10 mg  10 mg Q24HRS PRN Linda Todd, A.P.N.        And   • fleet enema 133 mL  1 Each Once PRN Linda Todd, A.P.N.       • magnesium sulfate ivpb premix 1 g  1 g QDAY Linda Todd, A.P.N.   Stopped at 17 1547   • aspirin EC (ECOTRIN) tablet 81 mg   81 mg DAILY Linda Todd A.P.N.       • MD ALERT... warfarin (COUMADIN) per pharmacy protocol   PRN Linda Todd A.P.N.       • electrolyte-A (PLASMALYTE-A) infusion   PRN Linda Todd A.P.N.       • clevidipine (CLEVIPREX) IV emulsion  0-10 mg/hr Continuous Linda Todd A.P.N.   Stopped at 03/09/17 0320   • nitroglycerin 50 mg in D5W 250 ml infusion  0-100 mcg/min Continuous Linda Todd, A.P.N.   Stopped at 03/08/17 1415   • oxycodone immediate release (ROXICODONE) tablet 5 mg  5 mg Q3HRS PRN Linda Todd, A.P.N.   5 mg at 03/08/17 2103   • oxycodone immediate release (ROXICODONE) tablet 10 mg  10 mg Q3HRS PRN Linda Todd, A.P.N.   10 mg at 03/09/17 0312   • tramadol (ULTRAM) 50 MG tablet 50 mg  50 mg Q4HRS PRN Linda Todd, A.P.N.       • dexmedetomidine (PRECEDEX) 200 mcg in NS 50 mL infusion  0-1.5 mcg/kg/hr Continuous Linda Todd A.P.N.   Stopped at 03/09/17 0200   • sodium bicarbonate 8.4 % injection 50 mEq  50 mEq Q HOUR PRN Linda Todd A.P.N.   50 mEq at 03/08/17 1501   • morphine (pf) 10 mg/ml injection 4 mg  4 mg Q HOUR PRN Linda Todd, A.P.N.   4 mg at 03/08/17 1722   • ondansetron (ZOFRAN) syringe/vial injection 4 mg  4 mg Q6HRS PRN Linda Todd, A.P.N.   4 mg at 03/08/17 2053    Or   • prochlorperazine (COMPAZINE) injection 10 mg  10 mg Q6HRS PRN Linda Todd, A.P.N.        Or   • promethazine (PHENERGAN) suppository 25 mg  25 mg Q6HRS PRN Linda Todd, A.P.N.       • acetaminophen (TYLENOL) tablet 650 mg  650 mg Q4HRS PRN Linda Todd, A.P.N.        Or   • acetaminophen (TYLENOL) suppository 650 mg  650 mg Q4HRS PRN Linda Todd, A.P.N.       • mag hydrox-al hydrox-simeth (MAALOX PLUS ES or MYLANTA DS) suspension 30 mL  30 mL Q4HRS PRN Linda Todd, A.P.N.       • diphenhydrAMINE (BENADRYL) tablet/capsule 25 mg  25 mg HS PRN - MR X 1 Linda Todd, A.P.N.   25 mg at 03/08/17 4368   • hydrocodone-acetaminophen (NORCO) 5-325 MG per tablet 1-2 Tab  1-2 Tab  Q4HRS PRN SHARMILA Anne   2 Tab at 03/09/17 0522   • epinephrine 1 mg/mL(1:1000) 4 mg in  mL Infusion  0-0.2 mcg/kg/min Continuous Cornelia Wright M.D.   Stopped at 03/08/17 1500   • norepinephrine (LEVOPHED) 8 mg in  mL Infusion  0-30 mcg/min Continuous Cornelia Wright M.D.   Stopped at 03/08/17 1451   • insulin regular human (HUMULIN/NOVOLIN R) 62.5 Units in  mL infusion per protocol  1-6 Units/hr Continuous Cornelia Wright M.D. 8 mL/hr at 03/09/17 0515 2 Units/hr at 03/09/17 0515    And   • insulin regular (HUMULIN R) injection 0-10 Units  0-10 Units PRN Cornelia Wright M.D.   2 Units at 03/08/17 1810    And   • dextrose 50% (D50W) injection 25 mL  25 mL PRN Cornelia Wright M.D.   25 mL at 03/09/17 0111   • insulin lispro (HUMALOG) injection 0-20 Units  0-20 Units PRN Cornelia Wright M.D.       • lidocaine (XYLOCAINE) 1%  injection  1-2 mL Q30 MIN PRN Hellen Carpio M.D.   2 mL at 03/08/17 1723     Last reviewed on 3/8/2017  8:21 AM by Ana Garner R.N.    Quality  Measures:  EKG reviewed, Medications reviewed, Labs reviewed and Radiology images reviewed  Fragoso catheter: Critically Ill - Requiring Accurate Measurement of Urinary Output  Central line in place: Need for access and Vasopressors    DVT Prophylaxis: Contraindicated - High bleeding risk  DVT prophylaxis - mechanical: SCDs  Ulcer prophylaxis: Yes          Problems/Plan:  Acute hypoxic respiratory failure likely related to mitral valve replacement as well as moderate pulmonary hypertension.   - Extubated on 3/8 per protocol   - Cont RT/O2 protocols   - Encourage IS and PEP  Severe stenosis of a prosthetic mitral valve, status post replacement.   - CT surgery protocols   - will start warfarin once chest tube removed  Moderate pulmonary hypertension likely due to severe mitral stenosis.   - Cont O2 therapy   - Repeat ECHO in next 2-3 months   - Should have an outpatient sleep study to evaluate for ESTELA and need for  CPAP  Mild cardiogenic shock, likely related to vasoplegia after mitral valve replacement.   - weaned off vasopressors  History of atrial fibrillation, on chronic anticoagulation.   - will restart anticoagulation once the chest tubes are removed  History of right deep venous thrombosis.  Acute hypokalemia.   - Replete as needed  Hyperglycemia in the setting of type 2 diabetes.   - ISS and NPH  History of chronic marijuana abuse.  History of systemic arterial hypertension.  History of gout.      Discussed patient condition and risk of morbidity and/or mortality with RN, RT, Pharmacy, Dietary, , Charge nurse / hot rounds, Patient and CVS.    The patient remains critically ill.  Critical care time = 35 minutes in directly providing and coordinating critical care and extensive data review.  No time overlap and excludes procedures.

## 2017-03-09 NOTE — RESPIRATORY CARE
Extubation    Cuff leak noted yes  Stridor present no        O2 (LPM): 6 (03/08/17 2100)     Patient toleration well  RCP Complete? yes  Events/Summary/Plan: Pt extubated per protocol. Positive cuff leak, negative stridor. Tolerated well (03/08/17 0320)

## 2017-03-09 NOTE — PROGRESS NOTES
Received report from Meena AKERS; assumed patient care.  Assessment complete, A & O x 4, VSS with cleviprex in use for blood pressure control, MD ordered pain medication provided for patient comfort.  Updated on POC, verbalizes understanding.  Bed in lowest position, call light within reach, RN at bedside.

## 2017-03-09 NOTE — PROGRESS NOTES
Pt sleeping comfortably in bed, reports tolerable pain.  VSS, still requiring low dose Cleviprex for blood pressure control.

## 2017-03-09 NOTE — PROGRESS NOTES
Cardiovascular Surgery Progress Note    Name: Morales Olivier  MRN: [id number]  : 1955  Admit Date: 3/8/2017  5:35 AM  Procedure:  Procedure(s) and Anesthesia Type:     * MITRAL VALVE REPLACE-REDO - General     * KD - General  1 Day Post-Op    Vitals:  Patient Vitals for the past 8 hrs:   Monitored Temp SpO2 O2 Delivery O2 (LPM) Pulse Heart Rate (Monitored) Resp NIBP Weight   17 0700 37.1 °C (98.8 °F) 96 % - - 60 60 16 - -   17 0600 37 °C (98.6 °F) 95 % - - 60 60 17 108/64 mmHg 122.9 kg (270 lb 15.1 oz)   17 0545 - - - - 60 60 - - -   17 0500 37.1 °C (98.8 °F) 95 % - - 60 60 13 - -   17 0430 - - - - 60 60 14 - -   17 0415 - - - - 60 60 16 - -   17 0400 37.1 °C (98.8 °F) 96 % Silicone Nasal Cannula 3 60 60 17 114/78 mmHg -   17 0345 - - - - 60 60 20 - -   17 0330 - - - - 60 60 (!) 22 - -   17 0315 - - - - 60 60 20 - -   17 0300 37 °C (98.6 °F) 92 % - - 60 60 13 - -   17 0245 37 °C (98.6 °F) - - - 60 60 (!) 22 - -   17 0230 37 °C (98.6 °F) - - - 60 60 18 - -   17 0215 37.1 °C (98.8 °F) - - - 62 63 20 - -   17 0200 37.1 °C (98.8 °F) 96 % Silicone Nasal Cannula 4 61 61 19 109/80 mmHg -   17 0100 36.9 °C (98.4 °F) 94 % - - 60 62 18 115/75 mmHg -     Temp (24hrs), Av.5 °C (99.5 °F), Min:37.5 °C (99.5 °F), Max:37.5 °C (99.5 °F)      Respiratory:  Ramirez Vent Mode: Spont, PEEP/CPAP: 8, FiO2: 80, P Peak (PIP): 19, P MEAN: 11 Respiration: 16, Pulse Oximetry: 96 %, O2 Daily Delivery Respiratory : Silicone Nasal Cannula  Chest Tube Group 1 (A) 32Fr straight-Tube Status / Drainage: Patent;Sutured in Place;Draining;Scant;Sanguinous, Chest Tube Group 2 (B) Mediastinal 32Fr angled-Tube Status / Drainage: Patent;Scant;Sanguinous, Chest Tube Group 1 (A) 32Fr straight-Device: Suction 20 cm Water;Dry Closed Drainage System, Chest Tube Group 2 (B) Mediastinal 32Fr angled-Device: Suction 20 cm Water;Dry Closed Drainage System  Chest  Tube Drains:          Fluids:    Intake/Output Summary (Last 24 hours) at 03/09/17 0826  Last data filed at 03/09/17 0600   Gross per 24 hour   Intake 3726.45 ml   Output   1948 ml   Net 1778.45 ml     Admit weight: Weight: 117.8 kg (259 lb 11.2 oz)  Current weight: Weight: 122.9 kg (270 lb 15.1 oz) (03/09/17 0600)    Labs:  Recent Labs      03/07/17   0930  03/08/17   1410   WBC  6.5   --    RBC  4.86   --    HEMOGLOBIN  15.4   --    HEMATOCRIT  45.9   --    MCV  94.4   --    MCH  31.7   --    MCHC  33.6*   --    RDW  47.3   --    PLATELETCT  160*  196   MPV  9.7   --      Recent Labs      03/07/17   0930   NEUTSPOLYS  77.90*   LYMPHOCYTES  11.70*   MONOCYTES  7.30   EOSINOPHILS  2.30   BASOPHILS  0.50     Recent Labs      03/07/17   0930  03/08/17   1800  03/09/17   0005  03/09/17   0510   SODIUM  140   --    --   141   POTASSIUM  4.0  4.6  4.7  4.7   CHLORIDE  110   --    --   109   CO2  22   --    --   23   GLUCOSE  131*   --    --   121*   BUN  21   --    --   21   CREATININE  1.23   --    --   1.28   CALCIUM  9.3   --    --   7.8*     Recent Labs      03/07/17   0930  03/08/17   0710  03/08/17   1410  03/09/17   0510   APTT  27.1   --   28.1   --    INR  1.17*  1.11  1.51*  1.25*       Medications:  • insulin NPH  10 Units     • insulin lispro  7 Units     • insulin lispro  0-20 Units     • insulin lispro  4 Units     • tamsulosin  0.4 mg     • docusate sodium  100 mg      And   • senna-docusate  1 Tab     • magnesium sulfate  1 g Stopped (03/08/17 1547)   • aspirin EC  81 mg         Exam:   Review of Systems   Constitutional: Negative.    HENT: Negative.    Eyes: Negative.    Respiratory: Negative.    Cardiovascular: Negative.    Gastrointestinal: Negative.    Genitourinary: Negative.    Musculoskeletal: Negative.    Skin: Negative.    Neurological: Negative.    Endo/Heme/Allergies: Negative.    Psychiatric/Behavioral: Negative.        Physical Exam   Constitutional: He is oriented to person, place, and time.  He appears well-developed.   obese   HENT:   Head: Normocephalic.   Eyes: Conjunctivae are normal. Pupils are equal, round, and reactive to light.   Neck: Normal range of motion. No JVD present.   Cardiovascular: Normal rate.  Exam reveals friction rub.    paced   Pulmonary/Chest: Effort normal and breath sounds normal.   Dim bases   Abdominal: Soft. Bowel sounds are normal. He exhibits no distension.   Genitourinary: Penis normal.   grover   Musculoskeletal: Normal range of motion. He exhibits edema.   Neurological: He is alert and oriented to person, place, and time.   Skin: Skin is warm and dry.   Surgical incisions CDI   Psychiatric: He has a normal mood and affect. His behavior is normal.       Quality Measures:   Medications reviewed, EKG reviewed, Labs reviewed and Radiology images reviewed  Grover catheter: One or Two Days Post Surgery (Day of Surgery being Day 0)  Central line in place: Need for access and Concentrated IV drugs    DVT Prophylaxis: Contraindicated - High bleeding risk  DVT prophylaxis - mechanical: SCDs  Ulcer prophylaxis: Yes    Assessed for rehab: Patient was assess for and/or received rehabilitation services during this hospitalization      Assessment/Plan:  POD 1 Paced rate 60, BP borderline low.  CT min output, small airleka noted.  Renal stable. +1.5L, wts up.  PLAN:  Keep CTs, DC grover--diurese when DEEDEE stable.  Hold BB for now.  [place] to interrogate PPM/AICD--increase rates to 80 for improved output.      Active Hospital Problems    Diagnosis   • Mitral valve disorder [I05.9]

## 2017-03-09 NOTE — CARE PLAN
Problem: Day of surgery post CABG/Heart valve replacement  Goal: Stabilization in immediate post op period  Intervention: VS q 15 min x 4 hours, then q 1 hour. Include temperature immediately upon arrival. Check CO/CI q 2-4 hours and PRN  complete  Intervention: First post op hour labs and diagnostics per MD order  complete  Intervention: Serum K q 6 hours x 24 hours. ABG and CBC prn.  complete  Intervention: For FSBS greater than 130, start Post Cardiac Surgery Insulin Drip Protocol  Started see MAR  Intervention: FSBS frequency as per Cardiac Surgery Insulin Drip Protocol  Completed   Intervention: For patients on Beta Blockers: verify dose given prior to surgery or within 6 hours after arrival to the unit  Not given  Intervention: Chest tube to 20 cm suction, record CT drainage with VS  Completed check charting  Intervention: For CT drainage > 300 cc in first post op hour and/or 150 cc in subsequent hours: platelets, coag screen, fibrinogen, H&H per order  NA  Intervention: Titrate and wean off vasoactive drips per patient’s condition and per MD order while maintaining SBP  mmHg per MD order  Pressors stopped. Started on Cleviprex. See MAR  Intervention: VAP protocol in place  Completed   Intervention: Wean from vent per protocol (see protocol), extubation goal with 2-6 hours post op.  Completed extubated at 1820 see note  Intervention: IS q 1 hour while awake post extubation  Post extubation best 1700  Intervention: Bedrest until extubated and groin lines out  Completed   Intervention: Out of bed, dangle 4 hours post extubation  na  Intervention: Up in chair 4 hours, day of extubation  na  Intervention: Maintain all original surgical dressings for 24 hours  Still intact   Intervention: Clear liquids post extubation, advance as tolerated  Pt passed bedside swallow eval and is drinking water. Clear liquid diet ordered per protocol  Intervention: Discontinue [place] rolan and arterial line 12-18 hours post op if  hemodynamically stable and off vasoactive drips  NA  Intervention: A-Fib and DVT prophylaxis per MD order or contraindications documented (refer to DVT/VTE problem on Care Plan)  NA  Intervention: Amiodarone protocol per MD order  NA

## 2017-03-09 NOTE — PROGRESS NOTES
Monitor Summary:    V paced rhythm at rates of 60-70    - / 0.12 / -      12h chart check and report given to oncoming RN.

## 2017-03-09 NOTE — CONSULTS
DATE OF SERVICE:  03/08/2017    REFERRING PHYSICIAN:  Dr. Cornelia Wright.    REASON FOR CONSULTATION:  Ventilator management after mitral valve   replacement.    HISTORY OF PRESENT ILLNESS:  The patient is a 61-year-old male with a past   medical history of mitral valve prosthetic stenosis that was worsening and   severe with moderate pulmonary hypertension with worsening dyspnea and chest   pain over the course of the last several months.  Patient was apparently   admitted to [facility] in January 2017 for evaluation of   his chest pain and dyspnea and was found to have severe stenosis of the   prosthetic mitral valve as well as moderate pulmonary hypertension with a   preserved EF of 47% as well as patent coronary arteries.  Due to this finding,   the patient was referred to Dr. Wright for evaluation of replacement of the   mitral valve.  The patient electively chose to undergo this procedure and was   admitted early this morning for elective replacement of the stenotic   prosthetic valve.    All history was obtained from chart notes as well as anesthesia at bedside   after the patient's procedure had been completed.  According to anesthesia,   the patient had a long onset until being placed on pump and then had a   prolonged pump.  At this time, the patient really did not have any   complications during the procedure; however, at the stenotic prosthetic valve,   it was difficult in removing; however, no excessive bleeding, arrhythmias, or   hypotension throughout the procedure.  After the valve was replaced, patient   was then taken off pump and started both on epinephrine as well as Levophed   while being taken back to the ICU.  Currently, the patient is intubated and   sedated on the ventilator during the evaluation.    REVIEW OF SYSTEMS:  Unobtainable as the patient is sedated and on the   ventilator at this time.    PAST MEDICAL HISTORY:  Includes:  1.  Coronary artery disease,  hypertension, atrial fibrillation status post   maze procedure as well as sick sinus syndrome requiring pacer placement.    Mitral regurgitation due to rheumatic heart disease status post mitral valve   replacement over 10 years ago.  Currently, has mitral stenosis.  2.  Right DVT in the past.  3.  Type 2 diabetes.  4.  Atrial fibrillation.  5.  Moderate pulmonary hypertension.    PAST SURGICAL HISTORY:  History of mitral valve replacement with maze   procedure in March 2008, thrombectomy with embolectomy in July 2009 for a   right DVT, umbilical hernia repair.    FAMILY HISTORY:  Unobtainable as the patient is intubated and sedated and   there is no chart with this information.    SOCIAL HISTORY:  The patient is a former smoker, history of quitting in   January 1981; however, per chart notes, patient smokes marijuana on a daily   basis.    OUTPATIENT MEDICATIONS:  Include Tylenol 1000 mg twice daily, albuterol 2   puffs every 6 hours as needed for shortness of breath, Lopressor 25 mg twice   daily, Flomax 0.4 mg daily, and lastly Coumadin 5 mg every Monday, Wednesday,   Friday, Saturday and Sunday with 2.5 mg on Tuesdays and Thursdays.    ALLERGIES:  No known drug allergies.    PHYSICAL EXAMINATION:  VITAL SIGNS:  Blood pressures have ranged from 90s-110s over 50s-60s, heart   rate in the 60s, respiratory rate of 15-16, T-max of 36.7 Celsius, oxygenation   of 93-95% on 60-80% FiO2.  GENERAL:  He is sedated on the ventilator at this time.  HEENT:  He is PERRLA, EOMI were not assessed.  Conjunctivae are pink.  Sclerae   are anicteric.  Oropharynx has ET tube in place with moist mucous membranes   and no oral mucosal breakdown.  NECK:  Supple.  No adenopathy or thyromegaly appreciated.  CARDIOVASCULAR:  Regular rate and rhythm without murmur, rub or gallop noted.  LUNGS:  Clear to auscultation bilaterally.  No wheezing appreciated.    Mediastinal tubes are in place with no overt bleeding.  ABDOMEN:  Soft, nontender,  nondistended.  Hypoactive bowel sounds.  No masses   appreciated.  EXTREMITIES:  No clubbing, cyanosis or edema.  There are 2+ dorsal pedal   pulses as well as 2+ radial pulses.  Good cap refill at 2 seconds as well as   warm and dry without evidence of mottling of the skin.  NEUROLOGIC:  Patient is sedated at this time, however, has corneal reflex as   well as a gag and cough.  He does appear to have symmetrical facial   expressions with suctioning.    LABORATORY DATA:  White count of 6.5, hemoglobin of 15.4, hematocrit of 45.9,   platelets of 196.  Sodium of 140, potassium of 4.0, chloride of 110, serum   bicarbonate of 22, glucose of 131, BUN of 21, creatinine of 1.23, calcium of   9.3, AST of 13, ALT of 7, alkaline phosphatase of 62, total bilirubin of 1.2,   albumin of 4.2.  Most recent magnesium is 2.9.  Most recent potassium is 3.0   as well as glucose of 169.  Most recent ABG shows a pH of 7.370, pCO2 of 34.9,   pO2 of 71 on ventilator settings of ASV, PEEP of 8, FiO2 of 60%.    IMAGING:  Portable chest x-ray post-procedure shows ET tube approximately 5-6   cm above the corina with bilateral pulmonary edema noted.    IMPRESSION:  1.  Acute hypoxic respiratory failure likely related to mitral valve   replacement as well as moderate pulmonary hypertension.  2.  Severe stenosis of a prosthetic mitral valve, status post replacement.  3.  Moderate pulmonary hypertension due to severe mitral stenosis.  4.  Mild cardiogenic shock, likely related to vasoplegia after mitral valve   replacement.  5.  History of atrial fibrillation, on chronic anticoagulation.  6.  History of right deep venous thrombosis.  7.  Acute hypokalemia.  8.  Hyperglycemia in the setting of type 2 diabetes.  9.  History of chronic marijuana abuse.  10.  History of systemic arterial hypertension.  11.  History of gout.    PLAN:  The patient arrived to the ICU intubated and sedated after his mitral   valve replacement, on cardiothoracic protocol  for weaning from the ventilator.    At this point, the patient continues to do relatively well; however, due to   his history of pulmonary hypertension may take longer and may require   intubation and ventilation overnight.  We will continue to wean per protocol   at this time.  In the meantime, we will continue to wean vasopressors   according to mean arterial pressures as well as monitor ABGs and electrolytes   and replete as needed.  The patient does have a history of tobacco use as well   as marijuana abuse.  The patient does not have wheezing at this time,   however, would likely need nebulizer therapy once he is extubated.  Due to the   patient's history of diabetes, we will continue insulin drip and monitor   expectedly.  He has been placed on SCDs and will eventually be placed back on   anticoagulation for his valve and history of DVT and atrial fibrillation.  He   has not required the pacemaker at this time; however, we will continue to   monitor this.    Thank you for allowing the pulmonary critical care team to participate in this   patient's care.  We will continue to follow along.    This case was discussed at length with the anesthesiologist, respiratory   therapist, ICU nursing staff, social work as well as pharmacy.  This patient   remains critically ill at this time.  Critical care time is approximately 60   minutes in direct critical care as well as extensive data review.  There is no   time overlap with other physicians and this excludes any procedures.       ____________________________________     MD CATHLEEN ROTHMAN / ANIBAL    DD:  03/08/2017 17:16:21  DT:  03/08/2017 19:23:22    D#:  [id number]  Job#:  [id number]

## 2017-03-10 LAB
ANION GAP SERPL CALC-SCNC: 6 MMOL/L (ref 0–11.9)
BUN SERPL-MCNC: 29 MG/DL (ref 8–22)
CALCIUM SERPL-MCNC: 8.2 MG/DL (ref 8.5–10.5)
CHLORIDE SERPL-SCNC: 101 MMOL/L (ref 96–112)
CO2 SERPL-SCNC: 26 MMOL/L (ref 20–33)
CREAT SERPL-MCNC: 1.37 MG/DL (ref 0.5–1.4)
ERYTHROCYTE [DISTWIDTH] IN BLOOD BY AUTOMATED COUNT: 49 FL (ref 35.9–50)
GFR SERPL CREATININE-BSD FRML MDRD: 53 ML/MIN/1.73 M 2
GLUCOSE BLD-MCNC: 106 MG/DL (ref 65–99)
GLUCOSE BLD-MCNC: 109 MG/DL (ref 65–99)
GLUCOSE BLD-MCNC: 109 MG/DL (ref 65–99)
GLUCOSE BLD-MCNC: 112 MG/DL (ref 65–99)
GLUCOSE BLD-MCNC: 118 MG/DL (ref 65–99)
GLUCOSE BLD-MCNC: 125 MG/DL (ref 65–99)
GLUCOSE BLD-MCNC: 131 MG/DL (ref 65–99)
GLUCOSE SERPL-MCNC: 110 MG/DL (ref 65–99)
HCT VFR BLD AUTO: 36.4 % (ref 42–52)
HGB BLD-MCNC: 11.8 G/DL (ref 14–18)
INR PPP: 1.21 (ref 0.87–1.13)
MCH RBC QN AUTO: 31.6 PG (ref 27–33)
MCHC RBC AUTO-ENTMCNC: 32.4 G/DL (ref 33.7–35.3)
MCV RBC AUTO: 97.3 FL (ref 81.4–97.8)
PLATELET # BLD AUTO: 134 K/UL (ref 164–446)
PMV BLD AUTO: 10.3 FL (ref 9–12.9)
POTASSIUM SERPL-SCNC: 4.5 MMOL/L (ref 3.6–5.5)
PROTHROMBIN TIME: 15.7 SEC (ref 12–14.6)
RBC # BLD AUTO: 3.74 M/UL (ref 4.7–6.1)
SODIUM SERPL-SCNC: 133 MMOL/L (ref 135–145)
WBC # BLD AUTO: 16.7 K/UL (ref 4.8–10.8)

## 2017-03-10 PROCEDURE — 85610 PROTHROMBIN TIME: CPT

## 2017-03-10 PROCEDURE — 85027 COMPLETE CBC AUTOMATED: CPT

## 2017-03-10 PROCEDURE — 770020 HCHG ROOM/CARE - TELE (206)

## 2017-03-10 PROCEDURE — 97161 PT EVAL LOW COMPLEX 20 MIN: CPT

## 2017-03-10 PROCEDURE — 700111 HCHG RX REV CODE 636 W/ 250 OVERRIDE (IP): Performed by: NURSE PRACTITIONER

## 2017-03-10 PROCEDURE — G8978 MOBILITY CURRENT STATUS: HCPCS | Mod: CK

## 2017-03-10 PROCEDURE — 700102 HCHG RX REV CODE 250 W/ 637 OVERRIDE(OP): Performed by: PHARMACIST

## 2017-03-10 PROCEDURE — A9270 NON-COVERED ITEM OR SERVICE: HCPCS | Performed by: NURSE PRACTITIONER

## 2017-03-10 PROCEDURE — 97166 OT EVAL MOD COMPLEX 45 MIN: CPT

## 2017-03-10 PROCEDURE — G8988 SELF CARE GOAL STATUS: HCPCS | Mod: CI

## 2017-03-10 PROCEDURE — G8979 MOBILITY GOAL STATUS: HCPCS | Mod: CI

## 2017-03-10 PROCEDURE — 700102 HCHG RX REV CODE 250 W/ 637 OVERRIDE(OP): Performed by: NURSE PRACTITIONER

## 2017-03-10 PROCEDURE — 99291 CRITICAL CARE FIRST HOUR: CPT | Performed by: INTERNAL MEDICINE

## 2017-03-10 PROCEDURE — G8987 SELF CARE CURRENT STATUS: HCPCS | Mod: CK

## 2017-03-10 PROCEDURE — 700112 HCHG RX REV CODE 229: Performed by: NURSE PRACTITIONER

## 2017-03-10 PROCEDURE — 82962 GLUCOSE BLOOD TEST: CPT | Mod: 91

## 2017-03-10 PROCEDURE — A9270 NON-COVERED ITEM OR SERVICE: HCPCS | Performed by: PHARMACIST

## 2017-03-10 PROCEDURE — 80048 BASIC METABOLIC PNL TOTAL CA: CPT

## 2017-03-10 RX ORDER — POTASSIUM CHLORIDE 20 MEQ/1
20 TABLET, EXTENDED RELEASE ORAL 2 TIMES DAILY
Status: DISCONTINUED | OUTPATIENT
Start: 2017-03-10 | End: 2017-03-12 | Stop reason: HOSPADM

## 2017-03-10 RX ORDER — CALCIUM CARBONATE 500 MG/1
500 TABLET, CHEWABLE ORAL 4 TIMES DAILY PRN
Status: DISCONTINUED | OUTPATIENT
Start: 2017-03-10 | End: 2017-03-12 | Stop reason: HOSPADM

## 2017-03-10 RX ORDER — AMIODARONE HYDROCHLORIDE 200 MG/1
400 TABLET ORAL TWICE DAILY
Status: DISCONTINUED | OUTPATIENT
Start: 2017-03-10 | End: 2017-03-12 | Stop reason: HOSPADM

## 2017-03-10 RX ORDER — FUROSEMIDE 10 MG/ML
40 INJECTION INTRAMUSCULAR; INTRAVENOUS 2 TIMES DAILY
Status: DISCONTINUED | OUTPATIENT
Start: 2017-03-10 | End: 2017-03-12 | Stop reason: HOSPADM

## 2017-03-10 RX ORDER — WARFARIN SODIUM 7.5 MG/1
7.5 TABLET ORAL
Status: COMPLETED | OUTPATIENT
Start: 2017-03-10 | End: 2017-03-11

## 2017-03-10 RX ADMIN — WARFARIN SODIUM 7.5 MG: 7.5 TABLET ORAL at 17:39

## 2017-03-10 RX ADMIN — METOPROLOL TARTRATE 25 MG: 25 TABLET, FILM COATED ORAL at 20:23

## 2017-03-10 RX ADMIN — OXYCODONE HYDROCHLORIDE 10 MG: 10 TABLET ORAL at 05:56

## 2017-03-10 RX ADMIN — STANDARDIZED SENNA CONCENTRATE AND DOCUSATE SODIUM 1 TABLET: 8.6; 5 TABLET, FILM COATED ORAL at 20:23

## 2017-03-10 RX ADMIN — FUROSEMIDE 40 MG: 10 INJECTION, SOLUTION INTRAVENOUS at 20:26

## 2017-03-10 RX ADMIN — METOPROLOL TARTRATE 25 MG: 25 TABLET, FILM COATED ORAL at 09:47

## 2017-03-10 RX ADMIN — LACTULOSE 30 ML: 10 SOLUTION ORAL at 15:26

## 2017-03-10 RX ADMIN — INSULIN HUMAN 10 UNITS: 100 INJECTION, SUSPENSION SUBCUTANEOUS at 20:30

## 2017-03-10 RX ADMIN — POTASSIUM CHLORIDE 20 MEQ: 1500 TABLET, EXTENDED RELEASE ORAL at 09:47

## 2017-03-10 RX ADMIN — FUROSEMIDE 40 MG: 10 INJECTION, SOLUTION INTRAVENOUS at 09:47

## 2017-03-10 RX ADMIN — HYDROCODONE BITARTRATE AND ACETAMINOPHEN 2 TABLET: 5; 325 TABLET ORAL at 01:33

## 2017-03-10 RX ADMIN — ASPIRIN 81 MG: 81 TABLET ORAL at 08:13

## 2017-03-10 RX ADMIN — OXYCODONE HYDROCHLORIDE 5 MG: 10 TABLET ORAL at 20:24

## 2017-03-10 RX ADMIN — DIPHENHYDRAMINE HCL 25 MG: 25 TABLET ORAL at 21:09

## 2017-03-10 RX ADMIN — POTASSIUM CHLORIDE 20 MEQ: 1500 TABLET, EXTENDED RELEASE ORAL at 20:23

## 2017-03-10 RX ADMIN — DOCUSATE SODIUM 100 MG: 100 CAPSULE ORAL at 08:13

## 2017-03-10 RX ADMIN — INSULIN LISPRO 7 UNITS: 100 INJECTION, SOLUTION INTRAVENOUS; SUBCUTANEOUS at 08:16

## 2017-03-10 RX ADMIN — INSULIN HUMAN 10 UNITS: 100 INJECTION, SUSPENSION SUBCUTANEOUS at 05:54

## 2017-03-10 RX ADMIN — ANTACID TABLETS 500 MG: 500 TABLET, CHEWABLE ORAL at 21:09

## 2017-03-10 RX ADMIN — AMIODARONE HYDROCHLORIDE 400 MG: 200 TABLET ORAL at 09:47

## 2017-03-10 RX ADMIN — HYDROCODONE BITARTRATE AND ACETAMINOPHEN 2 TABLET: 5; 325 TABLET ORAL at 14:07

## 2017-03-10 RX ADMIN — INSULIN LISPRO 7 UNITS: 100 INJECTION, SOLUTION INTRAVENOUS; SUBCUTANEOUS at 12:46

## 2017-03-10 RX ADMIN — ANTACID TABLETS 500 MG: 500 TABLET, CHEWABLE ORAL at 13:18

## 2017-03-10 RX ADMIN — OXYCODONE HYDROCHLORIDE 10 MG: 10 TABLET ORAL at 09:46

## 2017-03-10 RX ADMIN — AMIODARONE HYDROCHLORIDE 400 MG: 200 TABLET ORAL at 20:23

## 2017-03-10 RX ADMIN — TAMSULOSIN HYDROCHLORIDE 0.4 MG: 0.4 CAPSULE ORAL at 08:13

## 2017-03-10 RX ADMIN — MAGNESIUM SULFATE IN DEXTROSE 1 G: 10 INJECTION, SOLUTION INTRAVENOUS at 14:13

## 2017-03-10 RX ADMIN — INSULIN HUMAN 10 UNITS: 100 INJECTION, SUSPENSION SUBCUTANEOUS at 14:10

## 2017-03-10 RX ADMIN — OXYCODONE HYDROCHLORIDE 10 MG: 10 TABLET ORAL at 01:33

## 2017-03-10 RX ADMIN — TRAMADOL HYDROCHLORIDE 50 MG: 50 TABLET, COATED ORAL at 15:25

## 2017-03-10 ASSESSMENT — PAIN SCALES - GENERAL
PAINLEVEL_OUTOF10: 5
PAINLEVEL_OUTOF10: 8
PAINLEVEL_OUTOF10: 8
PAINLEVEL_OUTOF10: 7
PAINLEVEL_OUTOF10: 9
PAINLEVEL_OUTOF10: 7
PAINLEVEL_OUTOF10: 8
PAINLEVEL_OUTOF10: 8
PAINLEVEL_OUTOF10: 7
PAINLEVEL_OUTOF10: 7

## 2017-03-10 ASSESSMENT — GAIT ASSESSMENTS
GAIT LEVEL OF ASSIST: STAND BY ASSIST
DISTANCE (FEET): 100

## 2017-03-10 ASSESSMENT — ENCOUNTER SYMPTOMS
EYES NEGATIVE: 1
RESPIRATORY NEGATIVE: 1
NEUROLOGICAL NEGATIVE: 1
CARDIOVASCULAR NEGATIVE: 1
PSYCHIATRIC NEGATIVE: 1
CONSTITUTIONAL NEGATIVE: 1
GASTROINTESTINAL NEGATIVE: 1
MUSCULOSKELETAL NEGATIVE: 1

## 2017-03-10 ASSESSMENT — ACTIVITIES OF DAILY LIVING (ADL): TOILETING: INDEPENDENT

## 2017-03-10 NOTE — CARE PLAN
Problem: Post op day 2 CABG/Heart Valve Replacement  Goal: Optimal care of the post op CABG/heart valve replacement post op day 2  Intervention: Daily Weights  Will complete in am with morning walk.   Intervention: Up in chair for all meals  Will get patient up to chair for breakfast after am walk.   Intervention: Ambulate, increasing the distance each time x 3 and before bed  Ambulated before bed. Will ambulate again before breakfast.   Intervention: Stand at sink and wash up with assistance. Clean incisions twice daily with soap and water.  Completed. CHG to be completed during day shift.   Intervention: IS q 1 hour while awake and record best IS volume  IS 2000.   Intervention: Consider pacer wire removal by MD  Permanent pacer in place.   Intervention: Consider removal of grover and chest tube if not already done  Grover removed previous shift. Chest tube removal to be determined during day shift.

## 2017-03-10 NOTE — PROGRESS NOTES
Monitor Summary: Partially to 100% paced   Rate 60-80 *Pacer rate changed per MD order    12 hour  cc

## 2017-03-10 NOTE — PROGRESS NOTES
Inpatient Anticoagulation Service Note    Date: 3/10/2017  Reason for Anticoagulation: Bioprosthetic Valve Replacement  Hemoglobin Value: 11.8  Hematocrit Value: 36.4  Lab Platelet Value: 134  Target INR: 2.0 to 3.0  INR from last 7 days     Date/Time INR Value    03/10/17 0600 (!)1.21    03/09/17 0510 (!)1.25    03/08/17 1410 (!)1.51    03/08/17 0710 1.11    03/07/17 0930 (!)1.17        Dose from last 7 days     Date/Time Dose (mg)    03/10/17 1400 7.5    03/09/17 1600 0        Significant Interactions: Amiodarone, Aspirin  Bridge Therapy: No      Reversal Agent Administered: Not Applicable  Comments: Chest tubes removed today - okay to initiate warfarin dosing.     Plan:  Will give 7.5 mg dose tonight - pharmacy will continue to follow INR trend for dosing adjustments as appropriate.     Education Material Provided?: No    Pharmacist suggested discharge dosing: GABBY Snyder PharmD, BCPS

## 2017-03-10 NOTE — PROGRESS NOTES
Inpatient Anticoagulation Service Note    Date: 3/9/2017  Reason for Anticoagulation: Bioprosthetic Valve Replacement        Hemoglobin Value: 11.6  Hematocrit Value: 36.8  Lab Platelet Value: 104  Target INR: 2.0 to 3.0    INR from last 7 days     Date/Time INR Value    03/09/17 0510 (!)1.25    03/08/17 1410 (!)1.51    03/08/17 0710 1.11    03/07/17 0930 (!)1.17        Dose from last 7 days     None        Significant Interactions: Aspirin  Bridge Therapy: No     Reversal Agent Administered: Not Applicable  Comments: Post op day 1.  Patient still has chest tubes in place.  Will not dose at this time.  Pharmacy will continue to follow.    Plan:  Follow for chest tube removal  Education Material Provided?: No  Pharmacist suggested discharge dosing: To be determined     Kristy Durand, PharmD.  PGY-1 Resident  [phone]

## 2017-03-10 NOTE — CARE PLAN
Problem: Hyperinflation:  Goal: Prevent or improve atelectasis  Outcome: PROGRESSING AS EXPECTED  IS BID  IS value today 2100  60% of predicted = 1860

## 2017-03-10 NOTE — DISCHARGE PLANNING
Received choice form for SNF services, referral has been sent to [facility] per the request of the patient and choice form.

## 2017-03-10 NOTE — DISCHARGE INSTRUCTIONS
Discharge Instructions    Discharged to [facility] home by medical transportation with escort. Discharged via wheelchair, hospital escort: Yes.  Special equipment needed: Oxygen    Be sure to schedule a follow-up appointment with your primary care doctor or any specialists as instructed.     Discharge Plan:   Influenza Vaccine Indication: Patient Refuses ([facility] patient; will address at first post-op visit)    I understand that a diet low in cholesterol, fat, and sodium is recommended for good health. Unless I have been given specific instructions below for another diet, I accept this instruction as my diet prescription.   Other diet: cardiac-diabetic    Special Instructions: heart surgery and coumadin    Cardiac Surgery Discharge Instructions/[facility]    Activity:  1. NO driving for 4 weeks after surgery. You may ride as a passenger.  2. NO lifting of any item over 10 lbs (e.g. gallon of milk) for 6 weeks after surgery.  Do not raise both arms above head, only one at a time.  Do not push or pull with your arms.  3. Walk as much as possible! Walk a minimum of 4 times per day. Depending on your fatigue and comfort level, you may walk as much as you wish. There is no maximum.  4. Other physical activities (sex, housework, gardening, etc.) are OK after 4 weeks or after 8 weeks if you want to golf (start by putting, then advance to chipping, then to driving).  5. Continue using incentive spirometer for 2 weeks.  If you are going home on oxygen and you were not on oxygen prior to surgery, keep using until you are oxygen free.  6. Weigh daily and write it down starting  the next morning after you arrive home. Call your doctor for a weight gain of 2-4 lbs in 1-2 days.    Incision Care:  1. SHOWER EVERYDAY-no baths. Make sure to clean your incision(s) twice daily with plain, perfume and dye-free soap (if you shower in the morning, stand at the sink at bedtime and clean incision with soap and water). Then pat  incision(s) dry with clean towel. Avoid creams or lotions on the incision(s).    2. If there is any increase in redness or swelling, or separation of the incision line, or thick drainage* from any of the incisions, call [facility] ([phone]). * Clear, thin drainage is not abnormal especially from the leg incision and/or chest tube sites.                    3. Continue to wear your RAYMOND Stockings for 4 weeks on the leg(s) with the incision(s) or swelling. You may take off the stocking(s) when in bed or when the leg(s) are elevated.    General Instructions:  1. If you are on the blood thinner Coumadin (warfarin), you will need your coumadin levels checked periodically.  2. You have been referred to Cardiac Rehab which is highly recommended for you after heart surgery. You can start Cardiac Rehab 30 days after surgery.  If you do not have an appointment at the time of discharge call [phone] to schedule an appointment.  3. Your Primary Care Doctor typically handles Home Oxygen. The Home Oxygen service that drops off your tanks should be checking your oxygen saturation levels. Oxygen may be stopped when you are > 90 % saturated on room air. Check with your Primary Care Doctor if you are unsure.    Prescription Refills/Questions:   Call your usual Pharmacy for ALL refills   1. Pain medication questions only: Call [facility] at [phone].  (Remember that refills may require additional physician approval and will need at least 24 hours’ notice. Please call for refills on Mondays through Fridays from 9 am to 4 pm).  2. For all other medications (except pain medication): Call your Cardiology Group or Primary Care Doctor (not [facility]) for refills or questions.    [facility] IS ALWAYS AVAILABLE TO ANSWER YOUR QUESTIONS. DO NOT HESITATE TO CALL!    · Is patient discharged on Warfarin / Coumadin?   Yes    You are on the blood thinner Coumadin (warfarin) and you will need your  "coumadin levels checked periodically. For any questions call the [facility] @ [phone]. The home health nurse will draw your blood and the coumadin clinic will call with any change to your dose.      IMPORTANT: HOW TO USE THIS INFORMATION:  This is a summary and does NOT have all possible information about this product. This information does not assure that this product is safe, effective, or appropriate for you. This information is not individual medical advice and does not substitute for the advice of your health care professional. Always ask your health care professional for complete information about this product and your specific health needs.      WARFARIN - ORAL (WARF-uh-rin)      COMMON BRAND NAME(S): Coumadin      WARNING:  Warfarin can cause very serious (possibly fatal) bleeding. This is more likely to occur when you first start taking this medication or if you take too much warfarin. To decrease your risk for bleeding, your doctor or other health care provider will monitor you closely and check your lab results (INR test) to make sure you are not taking too much warfarin. Keep all medical and laboratory appointments. Tell your doctor right away if you notice any signs of serious bleeding. See also Side Effects section.      USES:  This medication is used to treat blood clots (such as in deep vein thrombosis-DVT or pulmonary embolus-PE) and/or to prevent new clots from forming in your body. Preventing harmful blood clots helps to reduce the risk of a stroke or heart attack. Conditions that increase your risk of developing blood clots include a certain type of irregular heart rhythm (atrial fibrillation), heart valve replacement, recent heart attack, and certain surgeries (such as hip/knee replacement). Warfarin is commonly called a \"blood thinner,\" but the more correct term is \"anticoagulant.\" It helps to keep blood flowing smoothly in your body by decreasing the amount of certain substances " (clotting proteins) in your blood.      HOW TO USE:  Read the Medication Guide provided by your pharmacist before you start taking warfarin and each time you get a refill. If you have any questions, ask your doctor or pharmacist. Take this medication by mouth with or without food as directed by your doctor or other health care professional, usually once a day. It is very important to take it exactly as directed. Do not increase the dose, take it more frequently, or stop using it unless directed by your doctor. Dosage is based on your medical condition, laboratory tests (such as INR), and response to treatment. Your doctor or other health care provider will monitor you closely while you are taking this medication to determine the right dose for you. Use this medication regularly to get the most benefit from it. To help you remember, take it at the same time each day. It is important to eat a balanced, consistent diet while taking warfarin. Some foods can affect how warfarin works in your body and may affect your treatment and dose. Avoid sudden large increases or decreases in your intake of foods high in vitamin K (such as broccoli, cauliflower, cabbage, brussels sprouts, kale, spinach, and other green leafy vegetables, liver, green tea, certain vitamin supplements). If you are trying to lose weight, check with your doctor before you try to go on a diet. Cranberry products may also affect how your warfarin works. Limit the amount of cranberry juice (16 ounces/480 milliliters a day) or other cranberry products you may drink or eat.      SIDE EFFECTS:  Nausea, loss of appetite, or stomach/abdominal pain may occur. If any of these effects persist or worsen, tell your doctor or pharmacist promptly. Remember that your doctor has prescribed this medication because he or she has judged that the benefit to you is greater than the risk of side effects. Many people using this medication do not have serious side effects. This  medication can cause serious bleeding if it affects your blood clotting proteins too much (shown by unusually high INR lab results). Even if your doctor stops your medication, this risk of bleeding can continue for up to a week. Tell your doctor right away if you have any signs of serious bleeding, including: unusual pain/swelling/discomfort, unusual/easy bruising, prolonged bleeding from cuts or gums, persistent/frequent nosebleeds, unusually heavy/prolonged menstrual flow, pink/dark urine, coughing up blood, vomit that is bloody or looks like coffee grounds, severe headache, dizziness/fainting, unusual or persistent tiredness/weakness, bloody/black/tarry stools, chest pain, shortness of breath, difficulty swallowing. Tell your doctor right away if any of these unlikely but serious side effects occur: persistent nausea/vomiting, severe stomach/abdominal pain, yellowing eyes/skin. This drug rarely has caused very serious (possibly fatal) problems if its effects lead to small blood clots (usually at the beginning of treatment). This can lead to severe skin/tissue damage that may require surgery or amputation if left untreated. Patients with certain blood conditions (protein C or S deficiency) may be at greater risk. Get medical help right away if any of these rare but serious side effects occur: painful/red/purplish patches on the skin (such as on the toe, breast, abdomen), change in the amount of urine, vision changes, confusion, slurred speech, weakness on one side of the body. A very serious allergic reaction to this drug is rare. However, get medical help right away if you notice any symptoms of a serious allergic reaction, including: rash, itching/swelling (especially of the face/tongue/throat), severe dizziness, trouble breathing. This is not a complete list of possible side effects. If you notice other effects not listed above, contact your doctor or pharmacist. In the [place] - Call your doctor for medical advice  about side effects. You may report side effects to FDA at 4-860-NLW-0883. In [place] - Call your doctor for medical advice about side effects. You may report side effects to [facility] [place] at 1-222.122.4561.      PRECAUTIONS:  Before taking warfarin, tell your doctor or pharmacist if you are allergic to it; or if you have any other allergies. This product may contain inactive ingredients, which can cause allergic reactions or other problems. Talk to your pharmacist for more details. Before using this medication, tell your doctor or pharmacist your medical history, especially of: blood disorders (such as anemia, hemophilia), bleeding problems (such as bleeding of the stomach/intestines, bleeding in the brain), blood vessel disorders (such as aneurysms), recent major injury/surgery, liver disease, alcohol use, mental/mood disorders (including memory problems), frequent falls/injuries. It is important that all your doctors and dentists know that you take warfarin. Before having surgery or any medical/dental procedures, tell your doctor or dentist that you are taking this medication and about all the products you use (including prescription drugs, nonprescription drugs, and herbal products). Avoid getting injections into the muscles. If you must have an injection into a muscle (for example, a flu shot), it should be given in the arm. This way, it will be easier to check for bleeding and/or apply pressure bandages. This medication may cause stomach bleeding. Daily use of alcohol while using this medicine will increase your risk for stomach bleeding and may also affect how this medication works. Limit or avoid alcoholic beverages. If you have not been eating well, if you have an illness or infection that causes fever, vomiting, or diarrhea for more than 2 days, or if you start using any antibiotic medications, contact your doctor or pharmacist immediately because these conditions can affect how warfarin works. This  medication can cause heavy bleeding. To lower the chance of getting cut, bruised, or injured, use great caution with sharp objects like safety razors and nail cutters. Use an electric razor when shaving and a soft toothbrush when brushing your teeth. Avoid activities such as contact sports. If you fall or injure yourself, especially if you hit your head, call your doctor immediately. Your doctor may need to check you. The Food & Drug Administration has stated that generic warfarin products are interchangeable. However, consult your doctor or pharmacist before switching warfarin products. Be careful not to take more than one medication that contains warfarin unless specifically directed by the doctor or health care provider who is monitoring your warfarin treatment. Older adults may be at greater risk for bleeding while using this drug. This medication is not recommended for use during pregnancy because of serious (possibly fatal) harm to an unborn baby. Discuss the use of reliable forms of birth control with your doctor. If you become pregnant or think you may be pregnant, tell your doctor immediately. If you are planning pregnancy, discuss a plan for managing your condition with your doctor before you become pregnant. Your doctor may switch the type of medication you use during pregnancy. Very small amounts of this medication may pass into breast milk but is unlikely to harm a nursing infant. Consult your doctor before breast-feeding.      DRUG INTERACTIONS:  Drug interactions may change how your medications work or increase your risk for serious side effects. This document does not contain all possible drug interactions. Keep a list of all the products you use (including prescription/nonprescription drugs and herbal products) and share it with your doctor and pharmacist. Do not start, stop, or change the dosage of any medicines without your doctor's approval. Warfarin interacts with many prescription,  "nonprescription, vitamin, and herbal products. This includes medications that are applied to the skin or inside the vagina or rectum. The interactions with warfarin usually result in an increase or decrease in the \"blood-thinning\" (anticoagulant) effect. Your doctor or other health care professional should closely monitor you to prevent serious bleeding or clotting problems. While taking warfarin, it is very important to tell your doctor or pharmacist of any changes in medications, vitamins, or herbal products that you are taking. Some products that may interact with this drug include: capecitabine, imatinib, mifepristone. Aspirin, aspirin-like drugs (salicylates), and nonsteroidal anti-inflammatory drugs (NSAIDs such as ibuprofen, naproxen, celecoxib) may have effects similar to warfarin. These drugs may increase the risk of bleeding problems if taken during treatment with warfarin. Carefully check all prescription/nonprescription product labels (including drugs applied to the skin such as pain-relieving creams) since the products may contain NSAIDs or salicylates. Talk to your doctor about using a different medication (such as acetaminophen) to treat pain/fever. Low-dose aspirin and related drugs (such as clopidogrel, ticlopidine) should be continued if prescribed by your doctor for specific medical reasons such as heart attack or stroke prevention. Consult your doctor or pharmacist for more details. Many herbal products interact with warfarin. Tell your doctor before taking any herbal products, especially bromelains, coenzyme Q10, cranberry, danshen, dong quai, fenugreek, garlic, ginkgo biloba, ginseng, and Thong's wort, among others. This medication may interfere with a certain laboratory test to measure theophylline levels, possibly causing false test results. Make sure laboratory personnel and all your doctors know you use this drug.      OVERDOSE:  If overdose is suspected, contact a poison control center " or emergency room immediately. [place] residents can call the [place] National Poison Hotline at 1-852.251.5104. [place] residents can call a provincial poison control center. Symptoms of overdose may include: bloody/black/tarry stools, pink/dark urine, unusual/prolonged bleeding.      NOTES:  Do not share this medication with others. Laboratory and/or medical tests (such as INR, complete blood count) must be performed periodically to monitor your progress or check for side effects. Consult your doctor for more details.      MISSED DOSE:  For the best possible benefit, do not miss any doses. If you do miss a dose and remember on the same day, take it as soon as you remember. If you remember on the next day, skip the missed dose and resume your usual dosing schedule. Do not double the dose to catch up because this could increase your risk for bleeding. Keep a record of missed doses to give to your doctor or pharmacist. Contact your doctor or pharmacist if you miss 2 or more doses in a row.      STORAGE:  Store at room temperature away from light and moisture. Do not store in the bathroom. Keep all medications away from children and pets. Do not flush medications down the toilet or pour them into a drain unless instructed to do so. Properly discard this product when it is  or no longer needed. Consult your pharmacist or local waste disposal company for more details about how to safely discard your product.      MEDICAL ALERT:  Your condition and medication can cause complications in a medical emergency. For information about enrolling in MedicAlert, call 1-536.498.6795 ([place]) or 1-298.161.6408 ([place]).      Information last revised 2010 Copyright(c) 2010 [employer]      · Is patient Post Blood Transfusion?  No    Depression / Suicide Risk    As you are discharged from this [facility] facility, it is important to learn how to keep safe from harming yourself.    Recognize the warning signs:  · Abrupt  changes in personality, positive or negative- including increase in energy   · Giving away possessions  · Change in eating patterns- significant weight changes-  positive or negative  · Change in sleeping patterns- unable to sleep or sleeping all the time   · Unwillingness or inability to communicate  · Depression  · Unusual sadness, discouragement and loneliness  · Talk of wanting to die  · Neglect of personal appearance   · Rebelliousness- reckless behavior  · Withdrawal from people/activities they love  · Confusion- inability to concentrate     If you or a loved one observes any of these behaviors or has concerns about self-harm, here's what you can do:  · Talk about it- your feelings and reasons for harming yourself  · Remove any means that you might use to hurt yourself (examples: pills, rope, extension cords, firearm)  · Get professional help from the community (Mental Health, Substance Abuse, psychological counseling)  · Do not be alone:Call your Safe Contact- someone whom you trust who will be there for you.  · Call your local CRISIS HOTLINE 115-2994 or 588-966-9554  · Call your local Children's Mobile Crisis Response Team [place] (571) 269-3871 or www.beneSol  · Call the toll free National Suicide Prevention Hotlines   · National Suicide Prevention Lifeline 394-445-DTXO (4941)  · National Hope Line Network 800-SUICIDE (108-7406)

## 2017-03-10 NOTE — THERAPY
"Occupational Therapy Evaluation completed.   Functional Status:  Min A supine>sit EOB, SBA sit>stand walking in room and in hallway no AD, and CGA easily SOB poor new learning, prompts to take rest breaks, returned to room requested BTB w/mod A, max A LB dressing able to complete grooming seated w/setup needs reinforcement of sternal/cardiac prec   Plan of Care: Will benefit from Occupational Therapy 3 times per week  Discharge Recommendations:  Equipment: Will Continue to Assess for Equipment Needs. Post-acute therapy recommended before discharged home.    61 yr old male dx w/ redp MVR pt is POD #3, pt has a hx of CHF and COPD, pt is easily SOB, presents w/generalized weakness and decreased activity tolerance as well as poor new learning w/regards to sternal/cardiac prec and application during ADLs needed constant reinforcement to not push/pull w/BUE during tasks, pt also has limited social/finacial resources currently living in a 2 story hotel w/no elevator and a community bathroom, at presents pt has a decline in his ability to complete basic ADL's and txfs recommend post acute therapy prior to d/c home     See \"Rehab Therapy-Acute\" Patient Summary Report for complete documentation.    "

## 2017-03-10 NOTE — PROGRESS NOTES
Cardiovascular Surgery Progress Note    Name: Morales Olivier  MRN: [id number]  : 1955  Admit Date: 3/8/2017  5:35 AM  Procedure:  Procedure(s) and Anesthesia Type:     * MITRAL VALVE REPLACE-REDO - General     * KD - General  2 Day Post-Op    Vitals:  Patient Vitals for the past 8 hrs:   Temp SpO2 O2 Delivery O2 (LPM) Pulse Heart Rate (Monitored) Resp NIBP Weight O2 (FiO2)   03/10/17 0826 - 93 % - 0 83 83 15 - - 21   03/10/17 0800 36.9 °C (98.5 °F) - None (Room Air) 0 - - - - - -   03/10/17 0600 - 96 % - - 80 80 17 - (!) 125.6 kg (276 lb 14.4 oz) -   03/10/17 0500 - 92 % - - 80 80 12 - - -   03/10/17 0400 36.7 °C (98 °F) 93 % Silicone Nasal Cannula 0.5 80 80 13 126/84 mmHg - -   03/10/17 0300 - 96 % - - 80 80 (!) 11 - - -   03/10/17 0200 - 96 % - - 80 80 (!) 10 - - -   03/10/17 0100 - 94 % - - 80 80 13 144/93 mmHg - -     Temp (24hrs), Av.9 °C (98.5 °F), Min:36.7 °C (98 °F), Max:37.2 °C (98.9 °F)      Respiratory:    Respiration: 15, Pulse Oximetry: 93 %, O2 Daily Delivery Respiratory : Room Air with O2 Available  Chest Tube Group 1 (A) 32Fr straight-Tube Status / Drainage: Patent;Draining;Small;Sanguinous, Chest Tube Group 2 (B) Mediastinal 32Fr angled-Tube Status / Drainage: Patent;Draining;Small;Sanguinous, Chest Tube Group 1 (A) 32Fr straight-Device: Suction 20 cm Water;Dry Closed Drainage System, Chest Tube Group 2 (B) Mediastinal 32Fr angled-Device: Suction 20 cm Water;Dry Closed Drainage System  Chest Tube Drains:     Mediastinal Chest Tube 1: 100 ml    Fluids:    Intake/Output Summary (Last 24 hours) at 03/10/17 0828  Last data filed at 03/10/17 0800   Gross per 24 hour   Intake 2260.67 ml   Output   1075 ml   Net 1185.67 ml     Admit weight: Weight: 117.8 kg (259 lb 11.2 oz)  Current weight: Weight: (!) 125.6 kg (276 lb 14.4 oz) (03/10/17 0600)    Labs:  Recent Labs      17   0930  17   1410  17   0900  03/10/17   0600   WBC  6.5   --   11.5*  16.7*   RBC  4.86   --   3.71*   3.74*   HEMOGLOBIN  15.4   --   11.6*  11.8*   HEMATOCRIT  45.9   --   36.8*  36.4*   MCV  94.4   --   99.2*  97.3   MCH  31.7   --   31.3  31.6   MCHC  33.6*   --   31.5*  32.4*   RDW  47.3   --   51.0*  49.0   PLATELETCT  160*  196  104*  134*   MPV  9.7   --   10.3  10.3     Recent Labs      03/07/17 0930  03/09/17   0900   NEUTSPOLYS  77.90*  88.00*   LYMPHOCYTES  11.70*  3.70*   MONOCYTES  7.30  7.70   EOSINOPHILS  2.30  0.00   BASOPHILS  0.50  0.20     Recent Labs      03/07/17 0930   03/09/17   0005  03/09/17   0510  03/10/17   0600   SODIUM  140   --    --   141  133*   POTASSIUM  4.0   < >  4.7  4.7  4.5   CHLORIDE  110   --    --   109  101   CO2  22   --    --   23  26   GLUCOSE  131*   --    --   121*  110*   BUN  21   --    --   21  29*   CREATININE  1.23   --    --   1.28  1.37   CALCIUM  9.3   --    --   7.8*  8.2*    < > = values in this interval not displayed.     Recent Labs      03/07/17 0930 03/08/17   1410  03/09/17   0510  03/10/17   0600   APTT  27.1   --   28.1   --    --    INR  1.17*   < >  1.51*  1.25*  1.21*    < > = values in this interval not displayed.       Medications:  • metoprolol  25 mg     • furosemide  40 mg     • potassium chloride SA  20 mEq     • amiodarone  400 mg     • insulin NPH  10 Units     • insulin lispro  7 Units     • insulin lispro  0-20 Units     • insulin lispro  4 Units     • tamsulosin  0.4 mg     • docusate sodium  100 mg      And   • senna-docusate  1 Tab     • magnesium sulfate  1 g Stopped (03/09/17 2689)   • aspirin EC  81 mg         Exam:   Review of Systems   Constitutional: Negative.    HENT: Negative.    Eyes: Negative.    Respiratory: Negative.    Cardiovascular: Negative.    Gastrointestinal: Negative.    Genitourinary: Negative.    Musculoskeletal: Negative.    Skin: Negative.    Neurological: Negative.    Endo/Heme/Allergies: Negative.    Psychiatric/Behavioral: Negative.        Physical Exam   Constitutional: He is oriented to person,  place, and time. He appears well-developed.   obese   HENT:   Head: Normocephalic.   Eyes: Conjunctivae are normal. Pupils are equal, round, and reactive to light.   Neck: Normal range of motion. No JVD present.   Cardiovascular: Normal rate.  Exam reveals friction rub.    paced   Pulmonary/Chest: Effort normal and breath sounds normal.   Dim bases   Abdominal: Soft. Bowel sounds are normal. He exhibits no distension.   Genitourinary: Penis normal.   grover   Musculoskeletal: Normal range of motion. He exhibits edema.   Neurological: He is alert and oriented to person, place, and time.   Skin: Skin is warm and dry.   Surgical incisions CDI   Psychiatric: He has a normal mood and affect. His behavior is normal.       Quality Measures:   Medications reviewed, EKG reviewed, Labs reviewed and Radiology images reviewed  Grover catheter: One or Two Days Post Surgery (Day of Surgery being Day 0)  Central line in place: Need for access and Concentrated IV drugs    DVT Prophylaxis: Contraindicated - High bleeding risk  DVT prophylaxis - mechanical: SCDs  Ulcer prophylaxis: Yes    Assessed for rehab: Patient was assess for and/or received rehabilitation services during this hospitalization      Assessment/Plan:  POD 1 Paced rate 60, BP borderline low.  CT min output, small airleka noted.  Renal stable. +1.5L, wts up.  PLAN:  Keep CTs, DC grover--diurese when DEEDEE stable.  Hold BB for now.  [place] to interrogate PPM/AICD--increase rates to 80 for improved output.  POD 2 HDS, paced at 80- restart, dc mediastinal tubes, Cr stable- start diuresis, amb, enc IS    Patient seen, examined and plan reviewed with midlevel provider. I agree with the plan.      Active Hospital Problems    Diagnosis   • Mitral valve disorder [I05.9]

## 2017-03-10 NOTE — PROGRESS NOTES
Pulmonary Critical Care Progress Note        Date of Service:  3/10/17  Chief Complaint: Dyspnea with exertion    History of Present Illness: 61 y.o. male admitted for elective replacement of a severely stenotic prosthetic mitral valve leading to RV dysfunction and moderate pulmonary hypertension.     ROS:  Respiratory: negative cough, negative hemoptysis, negative pleuritic chest pain and negative shortness of breath, Cardiac: positive chest pain, negative palpitations and negative leg swelling, GI: negative.  All other systems negative.    Interval Events:  24 hour interval history reviewed    - No events overnight   - IS at 2100cc   - Mobilizing in the hallways   - Chest tubes scheduled to be removed this afternoon    PFSH:  No change.    Respiratory:     Pulse Oximetry: 93 %   Chest tube with 300cc serous sanguinous fluid    Exam: clear to auscultation without rales or wheezes  ImagingCXR  cardiomegaly with mild bilateral pulmonary edema noted   Recent Labs      03/08/17   1543  03/08/17   1645  03/08/17   1810   ISTATAPH  7.353*  7.370*  7.463   ISTATAPCO2  35.2  34.9  32.5   ISTATAPO2  58*  71  64   ISTATATCO2  21  21  24   [id number]  89*  94  94   ISTATARTHCO3  19.6  20.2  23.2   ISTATARTBE  -5*  -4  0   ISTATTEMP  36.9 C  37.2 C  37.8 C   ISTATFIO2  80  80  40   ISTATSPEC  Arterial  Arterial  Arterial   ISTATAPHTC  7.355*  7.367*  7.450   [id number]  57*  72  68       HemoDynamics:  Pulse: 80, Heart Rate (Monitored): 80  Arterial BP: 109/95 mmHg, NIBP: 126/84 mmHg       Exam: regular rate and rhythm  Imaging: Available data reviewed        Neuro:  GCS Total [place] Coma Score: 15       Exam: no focal deficits noted mental status intact Motor and sensory exam grossly intact follows commands, raises two fingers  Imaging: None - Reviewed    Fluids:  Intake/Output       03/08/17 0700 - 03/09/17 0659 03/09/17 0700 - 03/10/17 0659 03/10/17 0700 - 03/11/17 0659      0684-1357 3987-5562 Total 4903-4664 0945-4646  Total 0711-8171 9375-4546 Total       Intake    P.O.  --  920 920  1150  700 1850  --  -- --    P.O. --  700 1850 -- -- --    I.V.  412  2194.5 2606.5  66.7  -- 66.7  --  -- --    Clevidipine Volume -- 13.6 13.6 -- -- -- -- -- --    Precedex Volume 66.8 152.6 219.4 -- -- -- -- -- --    Norepinephrine Volume 2.1 -- 2.1 -- -- -- -- -- --    Insulin Volume 33.9 148.3 182.1 66.7 -- 66.7 -- -- --    Epinephrine Volume 9.3 -- 9.3 -- -- -- -- -- --    IV Volume (OR Intake) -- 1300 1300 -- -- -- -- -- --    IV Volume (NS) -- 80 80 -- -- -- -- -- --    IV Piggyback Volume 300 500 800 -- -- -- -- -- --    Blood  200  -- 200  --  -- --  --  -- --    Platelets Total Volume (Non-Barcoded) 200 -- 200 -- -- -- -- -- --    Other  --  -- --  --  30 30  --  -- --    Medications (P.O./ Enteral Liquids) -- -- -- -- 30 30 -- -- --    Total Intake 612 3114.5 3726.5 1216.7 730 1946.7 -- -- --       Output    Urine  893  507 1400  300  450 750  --  -- --    Straight Catheter 393 507 900 300 450 750 -- -- --    Void (ml) 500 -- 500 -- -- -- -- -- --    Drains  196  352 548  175  -- 175  --  -- --    Hemovac 1 196 352 548 175 -- 175 -- -- --    Total Output 4347 703 8634 475 450 925 -- -- --       Net I/O     -477 2255.5 1778.5 741.7 280 1021.7 -- -- --        Weight: 122.9 kg (270 lb 15.1 oz)  Recent Labs      03/07/17   0930  03/08/17   1410  03/08/17   1800  03/09/17   0005  03/09/17   0510   SODIUM  140   --    --    --   141   POTASSIUM  4.0   --   4.6  4.7  4.7   CHLORIDE  110   --    --    --   109   CO2  22   --    --    --   23   BUN  21   --    --    --   21   CREATININE  1.23   --    --    --   1.28   MAGNESIUM   --   2.9*   --    --    --    CALCIUM  9.3   --    --    --   7.8*       GI/Nutrition:  Exam: abdomen is soft and non-tender, normal active bowel sounds  Imaging: Available data reviewed  taking PO  Liver Function  Recent Labs      03/07/17 0930 03/09/17   0510   ALTSGPT  7   --    ASTSGOT  13   --     ALKPHOSPHAT  62   --    TBILIRUBIN  1.2   --    GLUCOSE  131*  121*       Heme:  Recent Labs      17   0930  17   0710  17   1410  17   0510  17   0900   RBC  4.86   --    --    --   3.71*   HEMOGLOBIN  15.4   --    --    --   11.6*   HEMATOCRIT  45.9   --    --    --   36.8*   PLATELETCT  160*   --   196   --   104*   PROTHROMBTM  15.3*  14.7*  18.7*  16.1*   --    APTT  27.1   --   28.1   --    --    INR  1.17*  1.11  1.51*  1.25*   --        Infectious Disease:  Temp  Av.9 °C (98.5 °F)  Min: 36.7 °C (98 °F)  Max: 37.2 °C (98.9 °F)  Monitored Temp  Av.1 °C (98.7 °F)  Min: 37 °C (98.6 °F)  Max: 37.1 °C (98.8 °F)  Micro: reviewed  Recent Labs      17   0930  17   0900   WBC  6.5  11.5*   NEUTSPOLYS  77.90*  88.00*   LYMPHOCYTES  11.70*  3.70*   MONOCYTES  7.30  7.70   EOSINOPHILS  2.30  0.00   BASOPHILS  0.50  0.20   ASTSGOT  13   --    ALTSGPT  7   --    ALKPHOSPHAT  62   --    TBILIRUBIN  1.2   --      Current Facility-Administered Medications   Medication Dose Frequency Provider Last Rate Last Dose   • insulin NPH (HUMULIN,NOVOLIN) injection 10 Units  10 Units Q8HRS Linda Todd, A.P.N.   10 Units at 175   • insulin lispro (HUMALOG) injection 7 Units  7 Units TID AC Linda Todd, A.P.N.   7 Units at 17 1649   • insulin lispro (HUMALOG) injection 0-20 Units  0-20 Units ACHS & 0200 Linda Todd A.P.N.   Stopped at 03/10/17 0200   • insulin lispro (HUMALOG) injection 4 Units  4 Units Once Cornelia Wright M.D.   Stopped at 17 1100   • mag hydrox-al hydrox-simeth (MAALOX PLUS ES or MYLANTA DS) suspension 10 mL  10 mL 4X/DAY PRN Hellen Carpio M.D.       • tamsulosin (FLOMAX) capsule 0.4 mg  0.4 mg AFTER BREAKFAST Linda Todd, A.P.N.   0.4 mg at 17 0800   • albuterol inhaler 2 Puff  2 Puff Q6HRS PRN Linda Todd, A.P.N.       • Respiratory Care per Protocol   Continuous RT Lnida Todd, A.P.N.       • NS infusion    Continuous Linda Todd A.P.N.   Stopped at 03/09/17 0500   • Pharmacy Consult Request ...Pain Management Review 1 Each  1 Each PRN BRENDON Anne.P.N.       • docusate sodium (COLACE) capsule 100 mg  100 mg QAM Linda Todd A.P.N.   100 mg at 03/09/17 0818    And   • senna-docusate (PERICOLACE or SENOKOT S) 8.6-50 MG per tablet 1 Tab  1 Tab Nightly Linda Todd A.P.N.   1 Tab at 03/09/17 2018    And   • senna-docusate (PERICOLACE or SENOKOT S) 8.6-50 MG per tablet 1 Tab  1 Tab Q24HRS PRN Linda Todd A.P.N.        And   • lactulose 20 GM/30ML solution 30 mL  30 mL Q24HRS PRN Linda Todd A.P.N.        And   • bisacodyl (DULCOLAX) suppository 10 mg  10 mg Q24HRS PRN Linda Todd A.P.N.        And   • fleet enema 133 mL  1 Each Once PRN Linda Todd A.P.N.       • magnesium sulfate ivpb premix 1 g  1 g QDAY Linda Todd A.P.N.   Stopped at 03/09/17 1519   • aspirin EC (ECOTRIN) tablet 81 mg  81 mg DAILY Linda Todd A.P.N.   81 mg at 03/09/17 0818   • MD ALERT... warfarin (COUMADIN) per pharmacy protocol   PRN Linda Todd A.P.N.       • electrolyte-A (PLASMALYTE-A) infusion   PRN Linda Todd A.P.N.       • oxycodone immediate release (ROXICODONE) tablet 5 mg  5 mg Q3HRS PRN Linda Todd A.P.N.   5 mg at 03/08/17 2103   • oxycodone immediate release (ROXICODONE) tablet 10 mg  10 mg Q3HRS PRN Linda Todd A.P.N.   10 mg at 03/10/17 0133   • tramadol (ULTRAM) 50 MG tablet 50 mg  50 mg Q4HRS PRN Linda M White, A.P.N.   50 mg at 03/09/17 0614   • ondansetron (ZOFRAN) syringe/vial injection 4 mg  4 mg Q6HRS PRN Linda Todd, A.P.N.   4 mg at 03/09/17 0851    Or   • prochlorperazine (COMPAZINE) injection 10 mg  10 mg Q6HRS PRN Linda Todd, A.P.N.        Or   • promethazine (PHENERGAN) suppository 25 mg  25 mg Q6HRS PRN Linda Todd, A.P.N.       • acetaminophen (TYLENOL) tablet 650 mg  650 mg Q4HRS PRN Linda Todd, A.P.N.        Or   • acetaminophen (TYLENOL)  suppository 650 mg  650 mg Q4HRS PRN Linda Todd, A.P.N.       • diphenhydrAMINE (BENADRYL) tablet/capsule 25 mg  25 mg HS PRN - MR X 1 Linda Todd, A.P.N.   25 mg at 03/09/17 2234   • hydrocodone-acetaminophen (NORCO) 5-325 MG per tablet 1-2 Tab  1-2 Tab Q4HRS PRN Linda Todd A.P.N.   2 Tab at 03/10/17 0133   • insulin regular (HUMULIN R) injection 0-10 Units  0-10 Units PRN Cornelia Wright M.D.   2 Units at 03/08/17 1810    And   • dextrose 50% (D50W) injection 25 mL  25 mL PRN Cornelia Wright M.D.   25 mL at 03/09/17 0111     Last reviewed on 3/8/2017  8:21 AM by Ana Garner R.N.    Quality  Measures:  EKG reviewed, Medications reviewed, Labs reviewed and Radiology images reviewed  Fragoso catheter: Critically Ill - Requiring Accurate Measurement of Urinary Output  Central line in place: Need for access and Vasopressors    DVT Prophylaxis: Contraindicated - High bleeding risk  DVT prophylaxis - mechanical: SCDs  Ulcer prophylaxis: Yes          Problems/Plan:  Acute hypoxic respiratory failure likely related to mitral valve replacement as well as moderate pulmonary hypertension.   - Extubated on 3/8 per protocol   - Cont RT/O2 protocols   - Cont  IS and PEP  Severe stenosis of a prosthetic mitral valve, status post replacement.   - CT surgery protocols   - will start warfarin once chest tube removed  Moderate pulmonary hypertension likely due to severe mitral stenosis.   - Cont O2 therapy   - Repeat ECHO in next 2-3 months   - Should have an outpatient sleep study to evaluate for ESTELA and need for CPAP  Mild cardiogenic shock, likely related to vasoplegia after mitral valve replacement.   - weaned off vasopressors  History of atrial fibrillation, on chronic anticoagulation.   - will restart anticoagulation once the chest tubes are removed  History of right deep venous thrombosis.  Acute hypokalemia.   - Replete as needed  Hyperglycemia in the setting of type 2 diabetes.   - ISS and NPH  History of  chronic marijuana abuse.  History of systemic arterial hypertension.  History of gout.      Discussed patient condition and risk of morbidity and/or mortality with RN, RT, Pharmacy, Dietary, , Charge nurse / hot rounds, Patient and CVS.    The patient remains critically ill.  Critical care time = 32 minutes in directly providing and coordinating critical care and extensive data review.  No time overlap and excludes procedures.

## 2017-03-10 NOTE — DISCHARGE PLANNING
Skilled order received.  Discussed with patient. First choice [facility].  Anticipate DC Sunday.  Called [facility] and they do not have Male bed for Sunday.      Discussed with patient and Choice form completed 2nd choice to [facility].  Patient has PASRR [id number] and LOC [phone]  hx.  Called [facility] and they have Sunday male bed.     Weekend report completed.  CTS dictating Saturday or Sunday morning.

## 2017-03-10 NOTE — THERAPY
"Physical Therapy Evaluation completed.   Bed Mobility:  Supine to Sit: Moderate Assist  Transfers: Sit to Stand: Minimal Assist  Gait: Level Of Assist: Stand by Assist with No Equipment Needed       Plan of Care: Will benefit from Physical Therapy 3 times per week  Discharge Recommendations: Equipment: Will Continue to Assess for Equipment Needs. Post-acute therapy recommended before discharged home.    See \"Rehab Therapy-Acute\" Patient Summary Report for complete documentation.     "

## 2017-03-10 NOTE — DISCHARGE PLANNING
"Care Transition Team Assessment    Information Source  Orientation : Oriented x 4  Information Given By: Patient  Who is responsible for making decisions for patient? : Patient    Readmission Evaluation  Is this a readmission?: Yes - unplanned readmission  Why do you think you were readmitted?:  (Open heart)  Did you have enough support after your last discharge?: No  Please explain:  (Would benefit from C post skilled stay)    Elopement Risk  Legal Hold: No  Ambulatory or Self Mobile in Wheelchair: Yes  Disoriented: No  Psychiatric Symptoms: None  History of Wandering: No  Elopement this Admit: No  Vocalizing Wanting to Leave: No  Displays Behaviors, Body Language Wanting to Leave: No-Not at Risk for Elopement  Elopement Risk: Not at Risk for Elopement    Interdisciplinary Discharge Planning  Does Admitting Nurse Feel This Could be a Complex Discharge?: Yes  Primary Care Physician: None  Lives with - Patient's Self Care Capacity: Alone and Able to Care For Self  Patient or legal guardian wants to designate a caregiver (see row info): No  Support Systems: Friends / Neighbors  [facility]ousing / Facility: [facility]  Do You Take your Prescribed Medications Regularly: Yes  Able to Return to Previous ADL's: Yes  Mobility Issues: No  Prior Services: None  Patient Expects to be Discharged to:: Home (\"After I'm all better\")  Assistance Needed: No  Durable Medical Equipment: Not Applicable    Discharge Preparedness  What is your plan after discharge?: Skilled nursing facility  [facility] Sunday  What are your discharge supports?: Other (comment) (Friends)  Prior Functional Level: Independent with Activities of Daily Living  Difficulity with ADLs: None  Difficulity with IADLs: None    Functional Assesment  Prior Functional Level: Independent with Activities of Daily Living    Finances  Financial Barriers to Discharge: No  Prescription Coverage: Yes    Vision / Hearing Impairment  Vision Impairment : Yes  Right Eye Vision: Wears " Glasses  Left Eye Vision: Wears Glasses  Hearing Impairment : No    Values / Beliefs / Concerns  Values / Beliefs Concerns : No  Spiritual Requests During Hospitalization: No    Advance Directive  Advance Directive?: None    Domestic Abuse  Have you ever been the victim of abuse or violence?: No  Physical Abuse or Sexual Abuse: No  Verbal Abuse or Emotional Abuse: No  Possible Abuse Reported to:: Not Applicable    Psychological Assessment  History of Substance Abuse: None  History of Psychiatric Problems: No  Newly Diagnosed Illness: Yes    Discharge Risks or Barriers  Discharge risks or barriers?: Post-acute placement / services    Anticipated Discharge Information  Anticipated discharge disposition: SNF  Discharge Address:  ([address] [place] [address]  [address])  Discharge Contact Phone Number:  ([phone])

## 2017-03-10 NOTE — CARE PLAN
Problem: Nutritional:  Goal: Patient to verbalize or demonstrate understanding of diet  Outcome: MET Date Met:  03/09/17  Diet education provided by KASEY

## 2017-03-10 NOTE — PROGRESS NOTES
Received report and assumed patient care. Patient is sitting up in the chair. VS stable. Patient is post OHS day 1.

## 2017-03-10 NOTE — CARE PLAN
Problem: Safety  Goal: Will remain free from injury  Outcome: PROGRESSING AS EXPECTED    Problem: Respiratory:  Goal: Respiratory status will improve  Outcome: PROGRESSING AS EXPECTED

## 2017-03-11 LAB
ANION GAP SERPL CALC-SCNC: 6 MMOL/L (ref 0–11.9)
BUN SERPL-MCNC: 29 MG/DL (ref 8–22)
CALCIUM SERPL-MCNC: 8.4 MG/DL (ref 8.5–10.5)
CHLORIDE SERPL-SCNC: 97 MMOL/L (ref 96–112)
CO2 SERPL-SCNC: 28 MMOL/L (ref 20–33)
CREAT SERPL-MCNC: 1.31 MG/DL (ref 0.5–1.4)
ERYTHROCYTE [DISTWIDTH] IN BLOOD BY AUTOMATED COUNT: 49.1 FL (ref 35.9–50)
GFR SERPL CREATININE-BSD FRML MDRD: 55 ML/MIN/1.73 M 2
GLUCOSE BLD-MCNC: 75 MG/DL (ref 65–99)
GLUCOSE BLD-MCNC: 95 MG/DL (ref 65–99)
GLUCOSE SERPL-MCNC: 69 MG/DL (ref 65–99)
HCT VFR BLD AUTO: 35.5 % (ref 42–52)
HGB BLD-MCNC: 11.9 G/DL (ref 14–18)
INR PPP: 1.1 (ref 0.87–1.13)
MCH RBC QN AUTO: 32.2 PG (ref 27–33)
MCHC RBC AUTO-ENTMCNC: 33.5 G/DL (ref 33.7–35.3)
MCV RBC AUTO: 95.9 FL (ref 81.4–97.8)
PLATELET # BLD AUTO: 140 K/UL (ref 164–446)
PMV BLD AUTO: 10.6 FL (ref 9–12.9)
POTASSIUM SERPL-SCNC: 4.1 MMOL/L (ref 3.6–5.5)
PROTHROMBIN TIME: 14.6 SEC (ref 12–14.6)
RBC # BLD AUTO: 3.7 M/UL (ref 4.7–6.1)
SODIUM SERPL-SCNC: 131 MMOL/L (ref 135–145)
WBC # BLD AUTO: 15 K/UL (ref 4.8–10.8)

## 2017-03-11 PROCEDURE — 700112 HCHG RX REV CODE 229: Performed by: NURSE PRACTITIONER

## 2017-03-11 PROCEDURE — 770020 HCHG ROOM/CARE - TELE (206)

## 2017-03-11 PROCEDURE — A9270 NON-COVERED ITEM OR SERVICE: HCPCS | Performed by: NURSE PRACTITIONER

## 2017-03-11 PROCEDURE — 700102 HCHG RX REV CODE 250 W/ 637 OVERRIDE(OP): Performed by: PHARMACIST

## 2017-03-11 PROCEDURE — 82962 GLUCOSE BLOOD TEST: CPT

## 2017-03-11 PROCEDURE — 700102 HCHG RX REV CODE 250 W/ 637 OVERRIDE(OP): Performed by: NURSE PRACTITIONER

## 2017-03-11 PROCEDURE — 700101 HCHG RX REV CODE 250: Performed by: INTERNAL MEDICINE

## 2017-03-11 PROCEDURE — 99291 CRITICAL CARE FIRST HOUR: CPT | Performed by: INTERNAL MEDICINE

## 2017-03-11 PROCEDURE — A9270 NON-COVERED ITEM OR SERVICE: HCPCS | Performed by: PHARMACIST

## 2017-03-11 PROCEDURE — 80048 BASIC METABOLIC PNL TOTAL CA: CPT

## 2017-03-11 PROCEDURE — 700111 HCHG RX REV CODE 636 W/ 250 OVERRIDE (IP): Performed by: NURSE PRACTITIONER

## 2017-03-11 PROCEDURE — 85027 COMPLETE CBC AUTOMATED: CPT

## 2017-03-11 PROCEDURE — 85610 PROTHROMBIN TIME: CPT

## 2017-03-11 RX ORDER — ASPIRIN 81 MG/1
81 TABLET ORAL DAILY
Qty: 30 TAB | Status: SHIPPED | DISCHARGE
Start: 2017-03-11 | End: 2017-04-03

## 2017-03-11 RX ORDER — IPRATROPIUM BROMIDE AND ALBUTEROL SULFATE 2.5; .5 MG/3ML; MG/3ML
3 SOLUTION RESPIRATORY (INHALATION)
Status: DISCONTINUED | OUTPATIENT
Start: 2017-03-11 | End: 2017-03-12 | Stop reason: HOSPADM

## 2017-03-11 RX ORDER — AMIODARONE HYDROCHLORIDE 400 MG/1
400 TABLET ORAL DAILY
Status: SHIPPED | DISCHARGE
Start: 2017-03-11 | End: 2017-06-07

## 2017-03-11 RX ORDER — FUROSEMIDE 40 MG/1
40 TABLET ORAL DAILY
Qty: 30 TAB | Status: SHIPPED | DISCHARGE
Start: 2017-03-11 | End: 2017-04-03

## 2017-03-11 RX ORDER — POTASSIUM CHLORIDE 20 MEQ/1
20 TABLET, EXTENDED RELEASE ORAL DAILY
Status: SHIPPED | DISCHARGE
Start: 2017-03-11 | End: 2017-04-03

## 2017-03-11 RX ORDER — WARFARIN SODIUM 7.5 MG/1
7.5 TABLET ORAL DAILY
Qty: 30 TAB | Refills: 3 | Status: SHIPPED | DISCHARGE
Start: 2017-03-11 | End: 2017-04-03

## 2017-03-11 RX ORDER — HYDROCODONE BITARTRATE AND ACETAMINOPHEN 5; 325 MG/1; MG/1
1-2 TABLET ORAL EVERY 6 HOURS PRN
Status: SHIPPED | DISCHARGE
Start: 2017-03-11 | End: 2017-04-03

## 2017-03-11 RX ADMIN — OXYCODONE HYDROCHLORIDE 10 MG: 10 TABLET ORAL at 03:58

## 2017-03-11 RX ADMIN — AMIODARONE HYDROCHLORIDE 400 MG: 200 TABLET ORAL at 07:46

## 2017-03-11 RX ADMIN — DOCUSATE SODIUM 100 MG: 100 CAPSULE ORAL at 09:00

## 2017-03-11 RX ADMIN — FUROSEMIDE 40 MG: 10 INJECTION, SOLUTION INTRAVENOUS at 07:46

## 2017-03-11 RX ADMIN — ONDANSETRON 4 MG: 2 INJECTION, SOLUTION INTRAMUSCULAR; INTRAVENOUS at 00:09

## 2017-03-11 RX ADMIN — METOPROLOL TARTRATE 25 MG: 25 TABLET, FILM COATED ORAL at 20:07

## 2017-03-11 RX ADMIN — PROCHLORPERAZINE EDISYLATE 10 MG: 5 INJECTION INTRAMUSCULAR; INTRAVENOUS at 22:50

## 2017-03-11 RX ADMIN — METFORMIN HYDROCHLORIDE 500 MG: 500 TABLET, FILM COATED ORAL at 17:30

## 2017-03-11 RX ADMIN — FUROSEMIDE 40 MG: 10 INJECTION, SOLUTION INTRAVENOUS at 20:07

## 2017-03-11 RX ADMIN — OXYCODONE HYDROCHLORIDE 10 MG: 10 TABLET ORAL at 00:13

## 2017-03-11 RX ADMIN — OXYCODONE HYDROCHLORIDE 10 MG: 10 TABLET ORAL at 10:24

## 2017-03-11 RX ADMIN — TAMSULOSIN HYDROCHLORIDE 0.4 MG: 0.4 CAPSULE ORAL at 07:47

## 2017-03-11 RX ADMIN — ALBUTEROL SULFATE 2 PUFF: 90 AEROSOL, METERED RESPIRATORY (INHALATION) at 13:04

## 2017-03-11 RX ADMIN — IPRATROPIUM BROMIDE AND ALBUTEROL SULFATE 3 ML: .5; 3 SOLUTION RESPIRATORY (INHALATION) at 22:56

## 2017-03-11 RX ADMIN — LACTULOSE 30 ML: 10 SOLUTION ORAL at 20:06

## 2017-03-11 RX ADMIN — HYDROCODONE BITARTRATE AND ACETAMINOPHEN 2 TABLET: 5; 325 TABLET ORAL at 13:24

## 2017-03-11 RX ADMIN — POTASSIUM CHLORIDE 20 MEQ: 1500 TABLET, EXTENDED RELEASE ORAL at 20:07

## 2017-03-11 RX ADMIN — STANDARDIZED SENNA CONCENTRATE AND DOCUSATE SODIUM 1 TABLET: 8.6; 5 TABLET, FILM COATED ORAL at 20:06

## 2017-03-11 RX ADMIN — WARFARIN SODIUM 7.5 MG: 7.5 TABLET ORAL at 18:00

## 2017-03-11 RX ADMIN — HYDROCODONE BITARTRATE AND ACETAMINOPHEN 1 TABLET: 5; 325 TABLET ORAL at 07:47

## 2017-03-11 RX ADMIN — OXYCODONE HYDROCHLORIDE 10 MG: 10 TABLET ORAL at 20:03

## 2017-03-11 RX ADMIN — POTASSIUM CHLORIDE 20 MEQ: 1500 TABLET, EXTENDED RELEASE ORAL at 07:49

## 2017-03-11 RX ADMIN — ASPIRIN 81 MG: 81 TABLET ORAL at 07:47

## 2017-03-11 RX ADMIN — AMIODARONE HYDROCHLORIDE 400 MG: 200 TABLET ORAL at 20:07

## 2017-03-11 RX ADMIN — METOPROLOL TARTRATE 25 MG: 25 TABLET, FILM COATED ORAL at 07:47

## 2017-03-11 RX ADMIN — HYDROCODONE BITARTRATE AND ACETAMINOPHEN 2 TABLET: 5; 325 TABLET ORAL at 20:04

## 2017-03-11 ASSESSMENT — PAIN SCALES - GENERAL
PAINLEVEL_OUTOF10: 8
PAINLEVEL_OUTOF10: 6
PAINLEVEL_OUTOF10: ASSUMED PAIN PRESENT
PAINLEVEL_OUTOF10: 8
PAINLEVEL_OUTOF10: ASSUMED PAIN PRESENT
PAINLEVEL_OUTOF10: ASSUMED PAIN PRESENT
PAINLEVEL_OUTOF10: 8
PAINLEVEL_OUTOF10: 8
PAINLEVEL_OUTOF10: 10

## 2017-03-11 ASSESSMENT — ENCOUNTER SYMPTOMS
CARDIOVASCULAR NEGATIVE: 1
GASTROINTESTINAL NEGATIVE: 1
RESPIRATORY NEGATIVE: 1
PSYCHIATRIC NEGATIVE: 1
NEUROLOGICAL NEGATIVE: 1
EYES NEGATIVE: 1
CONSTITUTIONAL NEGATIVE: 1
MUSCULOSKELETAL NEGATIVE: 1

## 2017-03-11 NOTE — CARE PLAN
Problem: Post Op Day 3 CABG/Heart Valve replacement  Goal: Optimal care of the post op CABG/Heart Valve replacement post op day 3  Intervention: Daily Weights  Done noc  Intervention: Shower daily and clean incisions twice daily with soap and water  done  Intervention: Up in chair for all meals  done  Intervention: Ambulate, increasing the distance each time x 3 and before bed  Done, needs enforcement and reminders  Intervention: IS q 1 hour while awake and record best IS volume  Done 2200  Intervention: Consider removal of grover, chest tube and pacer wire if not already done  done  Intervention: Case management and  for discharge needs  Plan to d/c to SNF possibly tomorrow. On-call  attempting to get paperwork ready for d/c at this time.   Intervention: Discharge planning (See discharge barriers/planning problem on care plan)  Oxygen desats due to holding breath both awake and while asleep, sometimes down to the 70's before bouncing back to high 90's. May need sleep study referral. Unable to walk further than 0.5 lap of unit. Fatigues quickly, needs assistance getting out of bed and standing up.

## 2017-03-11 NOTE — PROGRESS NOTES
Inpatient Anticoagulation Service Note    Date: 3/11/2017  Reason for Anticoagulation: Bioprosthetic Valve Replacement  Hemoglobin Value: 11.9  Hematocrit Value: 35.5  Lab Platelet Value: 140  Target INR: 2.0 to 3.0  INR from last 7 days     Date/Time INR Value    03/11/17 0405 1.1    03/10/17 0600 (!)1.21    03/09/17 0510 (!)1.25    03/08/17 1410 (!)1.51    03/08/17 0710 1.11    03/07/17 0930 (!)1.17        Dose from last 7 days     Date/Time Dose (mg)    03/11/17 1400 7.5    03/10/17 1400 7.5    03/09/17 1600 0        Significant Interactions: Amiodarone, Aspirin  Bridge Therapy: No       Reversal Agent Administered: Not Applicable  Comments: Decrease in INR overnight after initiation of warfarin therapy yesterday - as anticipated after only one dose. No new drug interactions, H/H anemic but stable, no overt bleeding noted.      Plan:  Will give another 7.5 mg dose tonight to continue mobilization of INR and follow up on new value tomorrow to determine further dosing requirements.    Education Material Provided?: No    Pharmacist suggested discharge dosing: GABBY VivarD, BCPS

## 2017-03-11 NOTE — CARE PLAN
Problem: Post op day 2 CABG/Heart Valve Replacement  Goal: Optimal care of the post op CABG/heart valve replacement post op day 2  Outcome: PROGRESSING AS EXPECTED  Intervention: FSBS: when 2 consecutive BS < 130 after post op day 2, discontinue FSBS unless patient is insulin dependent diabetic  done  Intervention: Daily Weights  Done  Intervention: Up in chair for all meals  Done  Intervention: Ambulate, increasing the distance each time x 3 and before bed  ambulated X2, patient refused last walk before bed due to nausea  Intervention: Stand at sink and wash up with assistance. Clean incisions twice daily with soap and water.  done  Intervention: IS q 1 hour while awake and record best IS volume  Done, 1900 mL  Intervention: Consider pacer wire removal by MD  done  Intervention: Consider removal of grover and chest tube if not already done  done

## 2017-03-11 NOTE — PROGRESS NOTES
Pulmonary Critical Care Progress Note        Date of Service:  3/11/17  Chief Complaint: Dyspnea with exertion    History of Present Illness: 61 y.o. male admitted for elective replacement of a severely stenotic prosthetic mitral valve leading to RV dysfunction and moderate pulmonary hypertension.     ROS:  Respiratory: negative cough, negative hemoptysis, negative pleuritic chest pain and negative shortness of breath, Cardiac: positive chest pain, negative palpitations and negative leg swelling, GI: negative.  All other systems negative.    Interval Events:  24 hour interval history reviewed    - No overnight events   - Will desat at night into 70s while sleeping   - IS to 2250cc   - Ambulating in hallways   - Chest tube removed yesterday    PFSH:  No change.    Respiratory:     Pulse Oximetry: 97 %       Exam: clear to auscultation without rales or wheezes  ImagingCXR  cardiomegaly with mild bilateral pulmonary edema noted   Recent Labs      03/08/17   1543  03/08/17   1645  03/08/17   1810   ISTATAPH  7.353*  7.370*  7.463   ISTATAPCO2  35.2  34.9  32.5   ISTATAPO2  58*  71  64   ISTATATCO2  21  21  24   [id number]  89*  94  94   ISTATARTHCO3  19.6  20.2  23.2   ISTATARTBE  -5*  -4  0   ISTATTEMP  36.9 C  37.2 C  37.8 C   ISTATFIO2  80  80  40   ISTATSPEC  Arterial  Arterial  Arterial   ISTATAPHTC  7.355*  7.367*  7.450   [id number]  57*  72  68       HemoDynamics:  Pulse: 84, Heart Rate (Monitored): 80  NIBP: 150/102 mmHg       Exam: regular rate and rhythm  Imaging: Available data reviewed        Neuro:  GCS Total Jet Coma Score: 15       Exam: no focal deficits noted mental status intact Motor and sensory exam grossly intact follows commands, raises two fingers  Imaging: None - Reviewed    Fluids:  Intake/Output       03/09/17 0700 - 03/10/17 0659 03/10/17 0700 - 03/11/17 0659 03/11/17 0700 - 03/12/17 0659      1741-3673 4220-0840 Total 7699-6335 0249-7677 Total 4139-6399 8013-5300 Total       Intake     P.O.  1150  700 1850  1080  350 1430  --  -- --    P.O. 7932 461 3895 1312 490 4586 -- -- --    I.V.  66.7  -- 66.7  --  -- --  --  -- --    Insulin Volume 66.7 -- 66.7 -- -- -- -- -- --    Other  --  30 30  --  -- --  --  -- --    Medications (P.O./ Enteral Liquids) -- 30 30 -- -- -- -- -- --    Total Intake 1216.7 730 1946.7 2843 028 0828 -- -- --       Output    Urine  300  600 900  1350  1225 2575  --  -- --    Number of Times Voided -- -- -- -- 6 x 6 x -- -- --    Number of Times Incontinent of Urine -- -- -- -- 2 x 2 x -- -- --    Straight Catheter 300 600 900 -- -- -- -- -- --    Void (ml) -- -- -- 1350 1225 2575 -- -- --    Drains  175  100 275  --  -- --  --  -- --    Mediastinal Chest Tube 1 -- 100 100 -- -- -- -- -- --    Hemovac 1 175 -- 175 -- -- -- -- -- --    Stool  --  -- --  --  -- --  --  -- --    Number of Times Stooled -- -- -- 0 x 0 x 0 x -- -- --    Total Output  1350 1225 2575 -- -- --       Net I/O     741.7 30 771.7 -856 -875 -1145 -- -- --        Weight: (!) 125.6 kg (276 lb 14.4 oz)  Recent Labs      03/08/17   1410   03/09/17   0510  03/10/17   0600  03/11/17   0405   SODIUM   --    --   141  133*  131*   POTASSIUM   --    < >  4.7  4.5  4.1   CHLORIDE   --    --   109  101  97   CO2   --    --   23  26  28   BUN   --    --   21  29*  29*   CREATININE   --    --   1.28  1.37  1.31   MAGNESIUM  2.9*   --    --    --    --    CALCIUM   --    --   7.8*  8.2*  8.4*    < > = values in this interval not displayed.       GI/Nutrition:  Exam: abdomen is soft and non-tender, normal active bowel sounds  Imaging: Available data reviewed  taking PO  Liver Function  Recent Labs      03/09/17   0510  03/10/17   0600  03/11/17   0405   GLUCOSE  121*  110*  69       Heme:  Recent Labs      03/08/17   1410  03/09/17   0510  03/09/17   0900  03/10/17   0600  03/11/17   0405   RBC   --    --   3.71*  3.74*  3.70*   HEMOGLOBIN   --    --   11.6*  11.8*  11.9*   HEMATOCRIT   --    --   36.8*   36.4*  35.5*   PLATELETCT  196   --   104*  134*  140*   PROTHROMBTM  18.7*  16.1*   --   15.7*  14.6   APTT  28.1   --    --    --    --    INR  1.51*  1.25*   --   1.21*  1.10       Infectious Disease:  Temp  Av °C (98.6 °F)  Min: 36.2 °C (97.2 °F)  Max: 37.8 °C (100.1 °F)  Micro: reviewed  Recent Labs      17   0900  03/10/17   0600  17   0405   WBC  11.5*  16.7*  15.0*   NEUTSPOLYS  88.00*   --    --    LYMPHOCYTES  3.70*   --    --    MONOCYTES  7.70   --    --    EOSINOPHILS  0.00   --    --    BASOPHILS  0.20   --    --      Current Facility-Administered Medications   Medication Dose Frequency Provider Last Rate Last Dose   • metoprolol (LOPRESSOR) tablet 25 mg  25 mg BID Linda Todd A.P.N.   25 mg at 03/10/17 2023   • furosemide (LASIX) injection 40 mg  40 mg BID Linda Todd A.P.N.   40 mg at 03/10/17 2026   • potassium chloride SA (Kdur) tablet 20 mEq  20 mEq BID Linda Todd A.P.N.   20 mEq at 03/10/17 2023   • amiodarone (CORDARONE) tablet 400 mg  400 mg TWICE DAILY Lidna Todd A.P.N.   400 mg at 03/10/17 2023   • calcium carbonate (TUMS) chewable tab 500 mg  500 mg 4X/DAY PRN Linda Todd A.P.N.   500 mg at 03/10/17 2109   • warfarin (COUMADIN) tablet 7.5 mg  7.5 mg COUMADIN-DAILY Nicolas Snyder PHARMD   7.5 mg at 03/10/17 173   • insulin NPH (HUMULIN,NOVOLIN) injection 10 Units  10 Units Q8HRS Linda Todd A.P.N.   10 Units at 03/10/17 2030   • insulin lispro (HUMALOG) injection 7 Units  7 Units TID AC Linda Todd A.P.N.   Stopped at 03/10/17 1700   • insulin lispro (HUMALOG) injection 0-20 Units  0-20 Units ACHS & 0200 Linda Todd, A.P.N.   Stopped at 03/10/17 0200   • mag hydrox-al hydrox-simeth (MAALOX PLUS ES or MYLANTA DS) suspension 10 mL  10 mL 4X/DAY PRN Hellen Carpio M.D.       • tamsulosin (FLOMAX) capsule 0.4 mg  0.4 mg AFTER BREAKFAST Linda Todd, A.P.N.   0.4 mg at 03/10/17 0813   • albuterol inhaler 2 Puff  2 Puff Q6HRS PRN Linda  PRASHANT Todd A.P.N.       • Respiratory Care per Protocol   Continuous RT Linda Todd A.P.N.       • NS infusion   Continuous Linda Todd A.P.N.   Stopped at 03/09/17 0500   • Pharmacy Consult Request ...Pain Management Review 1 Each  1 Each PRN Linda Todd A.P.N.       • docusate sodium (COLACE) capsule 100 mg  100 mg QAM Linda Todd A.P.N.   100 mg at 03/10/17 0813    And   • senna-docusate (PERICOLACE or SENOKOT S) 8.6-50 MG per tablet 1 Tab  1 Tab Nightly Linda Todd A.P.N.   1 Tab at 03/10/17 2023    And   • senna-docusate (PERICOLACE or SENOKOT S) 8.6-50 MG per tablet 1 Tab  1 Tab Q24HRS PRN Linda Todd A.P.N.        And   • lactulose 20 GM/30ML solution 30 mL  30 mL Q24HRS PRN Linda Todd A.P.N.   30 mL at 03/10/17 1526    And   • bisacodyl (DULCOLAX) suppository 10 mg  10 mg Q24HRS PRN Linda Todd A.P.N.        And   • fleet enema 133 mL  1 Each Once PRN Linda Todd A.P.N.       • aspirin EC (ECOTRIN) tablet 81 mg  81 mg DAILY Linda Todd A.P.N.   81 mg at 03/10/17 0813   • MD ALERT... warfarin (COUMADIN) per pharmacy protocol   PRN Linda Todd A.P.N.       • electrolyte-A (PLASMALYTE-A) infusion   PRN Linda Todd A.P.N.       • oxycodone immediate release (ROXICODONE) tablet 5 mg  5 mg Q3HRS PRN Linda Todd A.P.N.   5 mg at 03/10/17 2024   • oxycodone immediate release (ROXICODONE) tablet 10 mg  10 mg Q3HRS PRN Linda Todd A.P.N.   10 mg at 03/11/17 0358   • tramadol (ULTRAM) 50 MG tablet 50 mg  50 mg Q4HRS PRN Linda Todd, A.P.N.   50 mg at 03/10/17 1525   • ondansetron (ZOFRAN) syringe/vial injection 4 mg  4 mg Q6HRS PRN Linda Todd, A.P.N.   4 mg at 03/11/17 0009    Or   • prochlorperazine (COMPAZINE) injection 10 mg  10 mg Q6HRS PRN Linda Todd, A.P.N.        Or   • promethazine (PHENERGAN) suppository 25 mg  25 mg Q6HRS PRN Linda Todd, A.P.N.       • acetaminophen (TYLENOL) tablet 650 mg  650 mg Q4HRS PRN Linda Todd, A.P.N.        Or    • acetaminophen (TYLENOL) suppository 650 mg  650 mg Q4HRS PRN Linda Todd, A.P.N.       • diphenhydrAMINE (BENADRYL) tablet/capsule 25 mg  25 mg HS PRN - MR X 1 Linda Todd, A.P.N.   25 mg at 03/10/17 2109   • hydrocodone-acetaminophen (NORCO) 5-325 MG per tablet 1-2 Tab  1-2 Tab Q4HRS PRN Linda Todd, A.P.N.   2 Tab at 03/10/17 1407   • insulin regular (HUMULIN R) injection 0-10 Units  0-10 Units PRN Cornelia Wright M.D.   2 Units at 03/08/17 1810    And   • dextrose 50% (D50W) injection 25 mL  25 mL PRN Cornelia Wright M.D.   25 mL at 03/09/17 0111     Last reviewed on 3/8/2017  8:21 AM by Ana Garner R.N.    Quality  Measures:  EKG reviewed, Medications reviewed, Labs reviewed and Radiology images reviewed  Fragoso catheter: Critically Ill - Requiring Accurate Measurement of Urinary Output  Central line in place: Need for access and Vasopressors    DVT Prophylaxis: Contraindicated - High bleeding risk  DVT prophylaxis - mechanical: SCDs  Ulcer prophylaxis: Yes          Problems/Plan:  Acute hypoxic respiratory failure likely related to mitral valve replacement as well as moderate pulmonary hypertension.   - Extubated on 3/8 per protocol   - Cont RT/O2 protocols   - Cont IS and PEP  Severe stenosis of a prosthetic mitral valve, status post replacement.   - CT surgery protocols   - warfarin  Moderate pulmonary hypertension likely due to severe mitral stenosis.   - Cont O2 therapy   - Repeat ECHO in next 2-3 months   - Should have an outpatient sleep study to evaluate for ESTELA and need for CPAP  Mild cardiogenic shock, likely related to vasoplegia after mitral valve replacement.   - weaned off vasopressors  History of atrial fibrillation, on chronic anticoagulation.   - will restart anticoagulation once the chest tubes are removed  History of right deep venous thrombosis.  Acute hypokalemia.   - Replete as needed  Hyperglycemia in the setting of type 2 diabetes.   - ISS and NPH  History of chronic  marijuana abuse.  History of systemic arterial hypertension.  History of gout.      Discussed patient condition and risk of morbidity and/or mortality with RN, RT, Pharmacy, Dietary, , Charge nurse / hot rounds, Patient and CVS.    The patient remains critically ill.  Critical care time = 31 minutes in directly providing and coordinating critical care and extensive data review.  No time overlap and excludes procedures.

## 2017-03-11 NOTE — PROGRESS NOTES
Cardiovascular Surgery Progress Note    Name: Morales Olivier  MRN: [id number]  : 1955  Admit Date: 3/8/2017  5:35 AM  Procedure:  Procedure(s) and Anesthesia Type:     * MITRAL VALVE REPLACE-REDO - General     * KD - General  3 Day Post-Op    Vitals:  Patient Vitals for the past 8 hrs:   Temp SpO2 O2 Delivery O2 (LPM) Pulse Heart Rate (Monitored) Resp NIBP Weight   17 0600 - 97 % Oxymask 2 80 80 (!) 10 121/87 mmHg -   17 0500 - 96 % - - 80 80 16 120/87 mmHg -   17 0400 37.5 °C (99.5 °F) 97 % Oxymask 1 84 80 17 150/102 mmHg 123.3 kg (271 lb 13.2 oz)   17 0300 - 88 % - - 79 80 16 138/85 mmHg -   17 0200 - 94 % - - 80 80 14 116/85 mmHg -   17 0100 - 93 % - - 80 74 15 126/77 mmHg -     Temp (24hrs), Av.1 °C (98.7 °F), Min:36.2 °C (97.2 °F), Max:37.8 °C (100.1 °F)      Respiratory:    Respiration: (!) 10, Pulse Oximetry: 97 %, O2 Daily Delivery Respiratory : Room Air with O2 Available     Chest Tube Drains:          Fluids:    Intake/Output Summary (Last 24 hours) at 17 0802  Last data filed at 17 0600   Gross per 24 hour   Intake   1050 ml   Output   2575 ml   Net  -1525 ml     Admit weight: Weight: 117.8 kg (259 lb 11.2 oz)  Current weight: Weight: 123.3 kg (271 lb 13.2 oz) (17 0400)    Labs:  Recent Labs      17   0900  03/10/17   0600  17   0405   WBC  11.5*  16.7*  15.0*   RBC  3.71*  3.74*  3.70*   HEMOGLOBIN  11.6*  11.8*  11.9*   HEMATOCRIT  36.8*  36.4*  35.5*   MCV  99.2*  97.3  95.9   MCH  31.3  31.6  32.2   MCHC  31.5*  32.4*  33.5*   RDW  51.0*  49.0  49.1   PLATELETCT  104*  134*  140*   MPV  10.3  10.3  10.6     Recent Labs      17   0900   NEUTSPOLYS  88.00*   LYMPHOCYTES  3.70*   MONOCYTES  7.70   EOSINOPHILS  0.00   BASOPHILS  0.20     Recent Labs      17   0510  03/10/17   0600  17   0405   SODIUM  141  133*  131*   POTASSIUM  4.7  4.5  4.1   CHLORIDE  109  101  97   CO2  23  26  28   GLUCOSE  121*  110*  69    BUN  21  29*  29*   CREATININE  1.28  1.37  1.31   CALCIUM  7.8*  8.2*  8.4*     Recent Labs      03/08/17   1410  03/09/17   0510  03/10/17   0600  03/11/17   0405   APTT  28.1   --    --    --    INR  1.51*  1.25*  1.21*  1.10       Medications:  • metformin  500 mg     • metoprolol  25 mg     • furosemide  40 mg     • potassium chloride SA  20 mEq     • amiodarone  400 mg     • warfarin  7.5 mg     • insulin NPH  10 Units     • insulin lispro  7 Units     • insulin lispro  0-20 Units     • tamsulosin  0.4 mg     • docusate sodium  100 mg      And   • senna-docusate  1 Tab     • aspirin EC  81 mg         Exam:   Review of Systems   Constitutional: Negative.    HENT: Negative.    Eyes: Negative.    Respiratory: Negative.    Cardiovascular: Negative.    Gastrointestinal: Negative.    Genitourinary: Negative.    Musculoskeletal: Negative.    Skin: Negative.    Neurological: Negative.    Endo/Heme/Allergies: Negative.    Psychiatric/Behavioral: Negative.        Physical Exam   Constitutional: He is oriented to person, place, and time. He appears well-developed.   obese   HENT:   Head: Normocephalic.   Eyes: Conjunctivae are normal. Pupils are equal, round, and reactive to light.   Neck: Normal range of motion. No JVD present.   Cardiovascular: Normal rate.  Exam reveals friction rub.    paced   Pulmonary/Chest: Effort normal and breath sounds normal.   Dim bases   Abdominal: Soft. Bowel sounds are normal. He exhibits no distension.   Genitourinary: Penis normal.   grover   Musculoskeletal: Normal range of motion. He exhibits edema.   Neurological: He is alert and oriented to person, place, and time.   Skin: Skin is warm and dry.   Surgical incisions CDI   Psychiatric: He has a normal mood and affect. His behavior is normal.       Quality Measures:   Medications reviewed, EKG reviewed, Labs reviewed and Radiology images reviewed  Grover catheter: One or Two Days Post Surgery (Day of Surgery being Day 0)  Central line  in place: Need for access and Concentrated IV drugs    DVT Prophylaxis: Contraindicated - High bleeding risk  DVT prophylaxis - mechanical: SCDs  Ulcer prophylaxis: Yes    Assessed for rehab: Patient was assess for and/or received rehabilitation services during this hospitalization      Assessment/Plan:  POD 1 Paced rate 60, BP borderline low.  CT min output, small airleka noted.  Renal stable. +1.5L, wts up.  PLAN:  Keep CTs, DC grover--diurese when DEEDEE stable.  Hold BB for now.  [place] to interrogate PPM/AICD--increase rates to 80 for improved output.  POD 2 HDS, paced at 80- restart, dc mediastinal tubes, Cr stable- start diuresis, amb, enc IS  POD 3 HDS, paced, diuresing well, Cr stable, DM- start metformin, amb, enc IS, to snf in am    Patient seen, examined and plan reviewed with midlevel provider. I agree with the plan.      Active Hospital Problems    Diagnosis   • Mitral valve disorder [I05.9]

## 2017-03-12 VITALS
RESPIRATION RATE: 20 BRPM | BODY MASS INDEX: 38.57 KG/M2 | OXYGEN SATURATION: 95 % | DIASTOLIC BLOOD PRESSURE: 91 MMHG | WEIGHT: 269.4 LBS | TEMPERATURE: 98.2 F | HEIGHT: 70 IN | SYSTOLIC BLOOD PRESSURE: 102 MMHG | HEART RATE: 80 BPM

## 2017-03-12 LAB
ANION GAP SERPL CALC-SCNC: 7 MMOL/L (ref 0–11.9)
BUN SERPL-MCNC: 25 MG/DL (ref 8–22)
CALCIUM SERPL-MCNC: 8.5 MG/DL (ref 8.5–10.5)
CHLORIDE SERPL-SCNC: 96 MMOL/L (ref 96–112)
CO2 SERPL-SCNC: 28 MMOL/L (ref 20–33)
CREAT SERPL-MCNC: 1.09 MG/DL (ref 0.5–1.4)
ERYTHROCYTE [DISTWIDTH] IN BLOOD BY AUTOMATED COUNT: 47.4 FL (ref 35.9–50)
GFR SERPL CREATININE-BSD FRML MDRD: >60 ML/MIN/1.73 M 2
GLUCOSE SERPL-MCNC: 105 MG/DL (ref 65–99)
HCT VFR BLD AUTO: 34.2 % (ref 42–52)
HGB BLD-MCNC: 11.6 G/DL (ref 14–18)
INR PPP: 1.42 (ref 0.87–1.13)
MCH RBC QN AUTO: 32 PG (ref 27–33)
MCHC RBC AUTO-ENTMCNC: 33.9 G/DL (ref 33.7–35.3)
MCV RBC AUTO: 94.5 FL (ref 81.4–97.8)
PLATELET # BLD AUTO: 116 K/UL (ref 164–446)
PMV BLD AUTO: 10.7 FL (ref 9–12.9)
POTASSIUM SERPL-SCNC: 4 MMOL/L (ref 3.6–5.5)
PROTHROMBIN TIME: 17.8 SEC (ref 12–14.6)
RBC # BLD AUTO: 3.62 M/UL (ref 4.7–6.1)
SODIUM SERPL-SCNC: 131 MMOL/L (ref 135–145)
WBC # BLD AUTO: 10.7 K/UL (ref 4.8–10.8)

## 2017-03-12 PROCEDURE — 700102 HCHG RX REV CODE 250 W/ 637 OVERRIDE(OP): Performed by: INTERNAL MEDICINE

## 2017-03-12 PROCEDURE — 80048 BASIC METABOLIC PNL TOTAL CA: CPT

## 2017-03-12 PROCEDURE — 85610 PROTHROMBIN TIME: CPT

## 2017-03-12 PROCEDURE — A9270 NON-COVERED ITEM OR SERVICE: HCPCS | Performed by: NURSE PRACTITIONER

## 2017-03-12 PROCEDURE — 700111 HCHG RX REV CODE 636 W/ 250 OVERRIDE (IP): Performed by: NURSE PRACTITIONER

## 2017-03-12 PROCEDURE — 99232 SBSQ HOSP IP/OBS MODERATE 35: CPT | Performed by: INTERNAL MEDICINE

## 2017-03-12 PROCEDURE — 85027 COMPLETE CBC AUTOMATED: CPT

## 2017-03-12 PROCEDURE — 700102 HCHG RX REV CODE 250 W/ 637 OVERRIDE(OP): Performed by: NURSE PRACTITIONER

## 2017-03-12 PROCEDURE — A9270 NON-COVERED ITEM OR SERVICE: HCPCS | Performed by: INTERNAL MEDICINE

## 2017-03-12 RX ADMIN — METFORMIN HYDROCHLORIDE 500 MG: 500 TABLET, FILM COATED ORAL at 08:24

## 2017-03-12 RX ADMIN — ONDANSETRON 4 MG: 2 INJECTION, SOLUTION INTRAMUSCULAR; INTRAVENOUS at 01:37

## 2017-03-12 RX ADMIN — POTASSIUM CHLORIDE 20 MEQ: 1500 TABLET, EXTENDED RELEASE ORAL at 08:28

## 2017-03-12 RX ADMIN — ASPIRIN 81 MG: 81 TABLET ORAL at 08:25

## 2017-03-12 RX ADMIN — METOPROLOL TARTRATE 25 MG: 25 TABLET, FILM COATED ORAL at 08:26

## 2017-03-12 RX ADMIN — AMIODARONE HYDROCHLORIDE 400 MG: 200 TABLET ORAL at 08:27

## 2017-03-12 RX ADMIN — ONDANSETRON 4 MG: 2 INJECTION, SOLUTION INTRAMUSCULAR; INTRAVENOUS at 11:47

## 2017-03-12 RX ADMIN — FUROSEMIDE 40 MG: 10 INJECTION, SOLUTION INTRAVENOUS at 08:29

## 2017-03-12 RX ADMIN — TAMSULOSIN HYDROCHLORIDE 0.4 MG: 0.4 CAPSULE ORAL at 08:28

## 2017-03-12 RX ADMIN — ALUMINUM HYDROXIDE, MAGNESIUM HYDROXIDE,SIMETHICONE 10 ML: 400; 400; 40 LIQUID ORAL at 05:40

## 2017-03-12 ASSESSMENT — PATIENT HEALTH QUESTIONNAIRE - PHQ9
2. FEELING DOWN, DEPRESSED, IRRITABLE, OR HOPELESS: NOT AT ALL
SUM OF ALL RESPONSES TO PHQ9 QUESTIONS 1 AND 2: 0
SUM OF ALL RESPONSES TO PHQ QUESTIONS 1-9: 0
1. LITTLE INTEREST OR PLEASURE IN DOING THINGS: NOT AT ALL

## 2017-03-12 ASSESSMENT — PAIN SCALES - GENERAL
PAINLEVEL_OUTOF10: ASSUMED PAIN PRESENT
PAINLEVEL_OUTOF10: ASSUMED PAIN PRESENT
PAINLEVEL_OUTOF10: 8
PAINLEVEL_OUTOF10: 8
PAINLEVEL_OUTOF10: ASSUMED PAIN PRESENT
PAINLEVEL_OUTOF10: ASSUMED PAIN PRESENT

## 2017-03-12 ASSESSMENT — COPD QUESTIONNAIRES
HAVE YOU SMOKED AT LEAST 100 CIGARETTES IN YOUR ENTIRE LIFE: YES
DO YOU EVER COUGH UP ANY MUCUS OR PHLEGM?: NO/ONLY WITH OCCASIONAL COLDS OR INFECTIONS
DURING THE PAST 4 WEEKS HOW MUCH DID YOU FEEL SHORT OF BREATH: SOME OF THE TIME
COPD SCREENING SCORE: 5

## 2017-03-12 ASSESSMENT — ENCOUNTER SYMPTOMS
NEUROLOGICAL NEGATIVE: 1
RESPIRATORY NEGATIVE: 1
GASTROINTESTINAL NEGATIVE: 1
EYES NEGATIVE: 1
CONSTITUTIONAL NEGATIVE: 1
PSYCHIATRIC NEGATIVE: 1
MUSCULOSKELETAL NEGATIVE: 1
CARDIOVASCULAR NEGATIVE: 1

## 2017-03-12 ASSESSMENT — LIFESTYLE VARIABLES: DO YOU DRINK ALCOHOL: NO

## 2017-03-12 NOTE — CARE PLAN
Problem: Post Op Day 4 CABG/Heart Valve Replacement  Goal: Optimal care of the Post Op CABG/Heart Valve replacement Post Op Day 4  Intervention: Daily Weights  done  Intervention: Shower daily and clean incisions twice daily with soap and water  Incision cleaned, refused showering  Intervention: Up in chair for all meals  Up for breakfast, refused up for other meals  Intervention: Ambulate, increasing the distance each time x 3 and before bed  Refused. Education provided.   Intervention: IS q 1 hour while awake and record best IS volume  Done 2200  Intervention: Consider removal of grover, chest tube and pacer wire if not already done  done  Intervention: Discharge Education  done

## 2017-03-12 NOTE — PROGRESS NOTES
contacted regarding pt discharge. SW will coordinate with [facility] and follow up with RN. Discharge paperwork will be printed and education provided to patient after call back with [facility].

## 2017-03-12 NOTE — PROGRESS NOTES
Pt transferred to another facility via transport service and student RN. All belongings in possession, d/c care discussed and paperwork signed. 3 copies printed; one for patient and each facility. 20g left ac in place, central line d/c'd.

## 2017-03-12 NOTE — DISCHARGE PLANNING
Spoke with SHASHA Soto, she is aware of transport request and will advised if patient will transfer today.

## 2017-03-12 NOTE — PROGRESS NOTES
Pulmonary Critical Care Progress Note        Date of Service:  3/12/17  Chief Complaint: Dyspnea with exertion    History of Present Illness: 61 y.o. male admitted for elective replacement of a severely stenotic prosthetic mitral valve leading to RV dysfunction and moderate pulmonary hypertension.     ROS:  Respiratory: negative cough, negative hemoptysis, negative pleuritic chest pain and negative shortness of breath, Cardiac: positive chest pain, negative palpitations and negative leg swelling, GI: negative.  All other systems negative.    Interval Events:  24 hour interval history reviewed    - No overnight events   - Hemodynamically stable   - Large BM yesterday   - IS at 2000 cc   - Still had desaturations last night with apneas noted    PFSH:  No change.    Respiratory:     Pulse Oximetry: 94 %room air      Exam: mild insp/exp wheezing   ImagingCXR  cardiomegaly with mild bilateral pulmonary edema noted         Invalid input(s): [id number]    HemoDynamics:  Pulse: 80, Heart Rate (Monitored): 80  NIBP: 138/85 mmHg       Exam: regular rate and rhythm  Imaging: Available data reviewed        Neuro:  GCS Total Dawn Coma Score: 15       Exam: no focal deficits noted mental status intact Motor and sensory exam grossly intact follows commands, raises two fingers  Imaging: None - Reviewed    Fluids:  Intake/Output       03/10/17 0700 - 03/11/17 0659 03/11/17 0700 - 03/12/17 0659 03/12/17 0700 - 03/13/17 0659      [id number] 6108-3354 Total 3077-4285 6592-1648 Total 0881-0963 1498-9457 Total       Intake    P.O.  1080  450 1530  850  1150 2000  --  -- --    P.O. 6902 357 6423 850 1150 2000 -- -- --    Total Intake 2222 562 9921 850 1150 2000 -- -- --       Output    Urine  1350  1225 2575  1000  1650 2650  --  -- --    Number of Times Voided -- 6 x 6 x 5 x 7 x 12 x -- -- --    Number of Times Incontinent of Urine -- 2 x 2 x -- -- -- -- -- --    Void (ml) 1350 1225 2575 1000 1650 2650 -- -- --    Stool  --  -- --   --  -- --  --  -- --    Number of Times Stooled 0 x 0 x 0 x -- 1 x 1 x -- -- --    Total Output 1350 1225 2575 1000 1650 2650 -- -- --       Net I/O     -270 -775 -1045 -150 -500 -650 -- -- --           Recent Labs      03/10/17   0600  03/11/17   0405   SODIUM  133*  131*   POTASSIUM  4.5  4.1   CHLORIDE  101  97   CO2  26  28   BUN  29*  29*   CREATININE  1.37  1.31   CALCIUM  8.2*  8.4*       GI/Nutrition:  Exam: abdomen is soft and non-tender, normal active bowel sounds  Imaging: Available data reviewed  taking PO  Liver Function  Recent Labs      03/10/17   0600  03/11/17   0405   GLUCOSE  110*  69       Heme:  Recent Labs      03/09/17   0900  03/10/17   0600  03/11/17   0405   RBC  3.71*  3.74*  3.70*   HEMOGLOBIN  11.6*  11.8*  11.9*   HEMATOCRIT  36.8*  36.4*  35.5*   PLATELETCT  104*  134*  140*   PROTHROMBTM   --   15.7*  14.6   INR   --   1.21*  1.10       Infectious Disease:  Temp  Av.7 °C (98 °F)  Min: 36.4 °C (97.5 °F)  Max: 37 °C (98.6 °F)  Micro: reviewed  Recent Labs      03/09/17   0900  03/10/17   0600  03/11/17   0405   WBC  11.5*  16.7*  15.0*   NEUTSPOLYS  88.00*   --    --    LYMPHOCYTES  3.70*   --    --    MONOCYTES  7.70   --    --    EOSINOPHILS  0.00   --    --    BASOPHILS  0.20   --    --      Current Facility-Administered Medications   Medication Dose Frequency Provider Last Rate Last Dose   • metformin (GLUCOPHAGE) tablet 500 mg  500 mg BID WITH MEALS Linda Todd A.P.N.   500 mg at 17 1730   • ipratropium-albuterol (DUONEB) nebulizer solution 3 mL  3 mL Q4H PRN (RT) Hellen Carpio M.D.   3 mL at 17 1662   • metoprolol (LOPRESSOR) tablet 25 mg  25 mg BID Linda Todd, A.P.N.   25 mg at 17   • furosemide (LASIX) injection 40 mg  40 mg BID Linda Todd, A.P.N.   40 mg at 17   • potassium chloride SA (Kdur) tablet 20 mEq  20 mEq BID Linda Todd, A.P.N.   20 mEq at 17   • amiodarone (CORDARONE) tablet 400 mg  400 mg TWICE  DAILY Linda Todd A.P.N.   400 mg at 03/11/17 2007   • calcium carbonate (TUMS) chewable tab 500 mg  500 mg 4X/DAY PRN Linda Todd A.P.N.   500 mg at 03/10/17 2109   • mag hydrox-al hydrox-simeth (MAALOX PLUS ES or MYLANTA DS) suspension 10 mL  10 mL 4X/DAY PRN Hellen Carpio M.D.       • tamsulosin (FLOMAX) capsule 0.4 mg  0.4 mg AFTER BREAKFAST Linda Todd A.P.N.   0.4 mg at 03/11/17 0747   • albuterol inhaler 2 Puff  2 Puff Q6HRS PRN Linda Todd A.P.N.   2 Puff at 03/11/17 1304   • Respiratory Care per Protocol   Continuous RT Linda Todd A.P.N.       • NS infusion   Continuous Linda Todd A.P.N.   Stopped at 03/09/17 0500   • Pharmacy Consult Request ...Pain Management Review 1 Each  1 Each PRN Linda Todd, A.P.N.       • docusate sodium (COLACE) capsule 100 mg  100 mg QAM Linda Todd A.P.N.   100 mg at 03/11/17 0900    And   • senna-docusate (PERICOLACE or SENOKOT S) 8.6-50 MG per tablet 1 Tab  1 Tab Nightly Linda Todd A.P.N.   1 Tab at 03/11/17 2006    And   • senna-docusate (PERICOLACE or SENOKOT S) 8.6-50 MG per tablet 1 Tab  1 Tab Q24HRS PRN Linda Todd, A.P.N.        And   • lactulose 20 GM/30ML solution 30 mL  30 mL Q24HRS PRN Linda Todd A.P.N.   30 mL at 03/11/17 2006    And   • bisacodyl (DULCOLAX) suppository 10 mg  10 mg Q24HRS PRN Linda Todd, A.P.N.        And   • fleet enema 133 mL  1 Each Once PRN Linda Todd A.P.N.       • aspirin EC (ECOTRIN) tablet 81 mg  81 mg DAILY BRENDON Anne.P.N.   81 mg at 03/11/17 0747   • MD ALERT... warfarin (COUMADIN) per pharmacy protocol   PRN BRENDON Anne.P.N.       • electrolyte-A (PLASMALYTE-A) infusion   PRN BRENDON Anne.P.N.       • oxycodone immediate release (ROXICODONE) tablet 5 mg  5 mg Q3HRS PRN BRENDON Anne.P.N.   5 mg at 03/10/17 2024   • oxycodone immediate release (ROXICODONE) tablet 10 mg  10 mg Q3HRS PRN Linda Todd A.P.N.   10 mg at 03/11/17 2003   • tramadol (ULTRAM) 50  MG tablet 50 mg  50 mg Q4HRS PRN Linda Todd, A.P.N.   50 mg at 03/10/17 1525   • ondansetron (ZOFRAN) syringe/vial injection 4 mg  4 mg Q6HRS PRN Linda Todd, A.P.N.   4 mg at 03/12/17 0137    Or   • prochlorperazine (COMPAZINE) injection 10 mg  10 mg Q6HRS PRN Linda Todd, A.P.N.   10 mg at 03/11/17 2250    Or   • promethazine (PHENERGAN) suppository 25 mg  25 mg Q6HRS PRN Linda Todd, A.P.N.       • acetaminophen (TYLENOL) tablet 650 mg  650 mg Q4HRS PRN Linda Todd, A.P.N.        Or   • acetaminophen (TYLENOL) suppository 650 mg  650 mg Q4HRS PRN Linda Todd, A.P.N.       • diphenhydrAMINE (BENADRYL) tablet/capsule 25 mg  25 mg HS PRN - MR X 1 Linda Todd, A.P.N.   25 mg at 03/10/17 2109   • hydrocodone-acetaminophen (NORCO) 5-325 MG per tablet 1-2 Tab  1-2 Tab Q4HRS PRN Linda Todd, A.P.N.   2 Tab at 03/11/17 2004     Last reviewed on 3/8/2017  8:21 AM by Ana Garner R.N.    Quality  Measures:  EKG reviewed, Medications reviewed, Labs reviewed and Radiology images reviewed  Fragoso catheter: Critically Ill - Requiring Accurate Measurement of Urinary Output  Central line in place: Need for access and Vasopressors    DVT Prophylaxis: Contraindicated - High bleeding risk  DVT prophylaxis - mechanical: SCDs  Ulcer prophylaxis: Yes          Problems/Plan:  Acute hypoxic respiratory failure likely related to mitral valve replacement as well as moderate pulmonary hypertension.   - Extubated on 3/8 per protocol   - Cont RT/O2 protocols   - Cont IS and PEP  Severe stenosis of a prosthetic mitral valve, status post replacement.   - CT surgery protocols   - warfarin  Moderate pulmonary hypertension likely due to severe mitral stenosis.   - Cont O2 therapy   - Repeat ECHO in next 2-3 months   - Will need a formal outpatient sleep study to evaluate for ESTELA/central sleep apnea and need for CPAP  Mild cardiogenic shock, likely related to vasoplegia after mitral valve replacement.   - weaned off  vasopressors  History of atrial fibrillation, on chronic anticoagulation.   - will restart anticoagulation once the chest tubes are removed  History of right deep venous thrombosis.  Acute hypokalemia.   - Replete as needed  Hyperglycemia in the setting of type 2 diabetes.   - ISS and NPH  History of chronic marijuana abuse.  History of systemic arterial hypertension.  History of gout.      Discussed patient condition and risk of morbidity and/or mortality with RN, RT, Pharmacy, Dietary, , Charge nurse / hot rounds, Patient and [facility].    [id number]

## 2017-03-12 NOTE — DISCHARGE SUMMARY
CHIEF COMPLAINT ON ADMISSION  No chief complaint on file.  Severe prosthetic mitral valve endocarditis      CODE STATUS  Full Code    HPI & HOSPITAL COURSE  This is a 61 y.o. year old male here with Severe prosthetic mitral valve endocarditis, was admitted for elective MVR.    POD 1  Paced at rate 60, BP borderline low.  CT min output, small airleak noted.  Renal stable. +1.5L, wts up.  PLAN:  Keep CTs, DC grover--diurese when DEEDEE stable.  Hold BB for now.  [place] to interrogate PPM/AICD--increase rates to 80 for improved output.  POD 2 HDS, paced at 80- restart beta blocker and amio for afib prophylaxis, dc mediastinal tubes, Cr stable- start diuresis, amb, enc IS  POD 3 HDS, paced, diuresing well, Cr stable, DM- start metformin, amb, enc IS, to snf in am    Therefore, he is discharged in good and stable condition for further post-acute management.           DISCHARGE PROBLEM LIST  Severe prosthetic valve MS,   HX MVR/maze/left atrial tumor resection 2008,   Mod severe TR,   chronic afib,   s/p PPM/AICD,   COPD,   htn,   DM,   hx of DVT on chronic anticoagulation    FOLLOW UP  Future Appointments  Date Time Provider Department Center   3/15/2017 11:00 AM Sumanth Yancey M.D. [facility] None   4/19/2017 11:20 AM Sumanth Yancey M.D. [facility] None     Cornelia Wright M.D.  [address]  [address]  [facility] [id number]  [phone]    On 4/10/2017  12:30 pm      MEDICATIONS ON DISCHARGE   Morales Olivier   Home Medication Instructions MICHELLE:[id number]    Printed on:03/11/17 2025   Medication Information                      albuterol 108 (90 BASE) MCG/ACT Aero Soln inhalation aerosol  Inhale 2 Puffs by mouth every 6 hours as needed for Shortness of Breath.             amiodarone (PACERONE) 400 MG tablet  Take 1 Tab by mouth every day.             aspirin EC 81 MG EC tablet  Take 1 Tab by mouth every day.             furosemide (LASIX) 40 MG Tab  Take 1 Tab by mouth every day.             hydrocodone-acetaminophen (NORCO) 5-325 MG Tab per  tablet  Take 1-2 Tabs by mouth every 6 hours as needed.             metformin (GLUCOPHAGE) 500 MG Tab  Take 1 Tab by mouth 2 times a day, with meals.             metoprolol (LOPRESSOR) 25 MG TABS  Take 1 Tab by mouth 2 Times a Day.             potassium chloride SA (KDUR) 20 MEQ Tab CR  Take 1 Tab by mouth every day.             tamsulosin (FLOMAX) 0.4 MG capsule  Take 0.4 mg by mouth ONE-HALF HOUR AFTER BREAKFAST.             warfarin (COUMADIN) 7.5 MG Tab  Take 1 Tab by mouth every day. Titrate to INR 2-3.                 DIET  Orders Placed This Encounter   Procedures   • DIET ORDER     Standing Status: Standing      Number of Occurrences: 1      Standing Expiration Date:      Order Specific Question:  Diet:     Answer:  Consistent Carbohydrate [4]       ACTIVITY    Sternal precautions. No lifting >10#, no pushing, pulling with arms. No driving for 1 month.    LINES, DRAINS, AND WOUNDS      Sternal incision- wash daily keep open to air                 MENTAL STATUS ON TRANSFER  Level of Consciousness: Alert  Orientation : Oriented x 4  Speech: Speech Clear    CONSULTATIONS  none    PROCEDURES  Re-do MVR 29 mm Medtronic mosaic tissue valve w/ KD    LABORATORY  Lab Results   Component Value Date/Time    SODIUM 131* 03/11/2017 04:05 AM    POTASSIUM 4.1 03/11/2017 04:05 AM    CHLORIDE 97 03/11/2017 04:05 AM    CO2 28 03/11/2017 04:05 AM    GLUCOSE 69 03/11/2017 04:05 AM    BUN 29* 03/11/2017 04:05 AM    CREATININE 1.31 03/11/2017 04:05 AM        Lab Results   Component Value Date/Time    WBC 15.0* 03/11/2017 04:05 AM    HEMOGLOBIN 11.9* 03/11/2017 04:05 AM    HEMATOCRIT 35.5* 03/11/2017 04:05 AM    PLATELET COUNT 140* 03/11/2017 04:05 AM

## 2017-03-12 NOTE — PROGRESS NOTES
Cardiovascular Surgery Progress Note    Name: Morales Olivier  MRN: [id number]  : 1955  Admit Date: 3/8/2017  5:35 AM  Procedure:  Procedure(s) and Anesthesia Type:     * MITRAL VALVE REPLACE-REDO - General     * KD - General  4 Day Post-Op    Vitals:  Patient Vitals for the past 8 hrs:   Temp SpO2 O2 Delivery O2 (LPM) Pulse Heart Rate (Monitored) Resp NIBP   17 0400 36.4 °C (97.5 °F) 94 % None (Room Air) 0 - 80 18 138/85 mmHg   17 0300 - - - - - 80 13 -   17 0200 - - - - - 80 15 -   17 0100 - - - - 80 80 16 111/77 mmHg   17 0000 36.4 °C (97.5 °F) - - - 80 80 15 111/74 mmHg     Temp (24hrs), Av.7 °C (98 °F), Min:36.4 °C (97.5 °F), Max:37 °C (98.6 °F)      Respiratory:    Respiration: 18, Pulse Oximetry: 94 %, O2 Daily Delivery Respiratory : Silicone Nasal Cannula     Chest Tube Drains:          Fluids:    Intake/Output Summary (Last 24 hours) at 17 0712  Last data filed at 17 0600   Gross per 24 hour   Intake   2000 ml   Output   3050 ml   Net  -1050 ml     Admit weight: Weight: 117.8 kg (259 lb 11.2 oz)  Current weight: Weight: 123.3 kg (271 lb 13.2 oz) (17 0400)    Labs:  Recent Labs      03/10/17   0600  17   0405  17   0455   WBC  16.7*  15.0*  10.7   RBC  3.74*  3.70*  3.62*   HEMOGLOBIN  11.8*  11.9*  11.6*   HEMATOCRIT  36.4*  35.5*  34.2*   MCV  97.3  95.9  94.5   MCH  31.6  32.2  32.0   MCHC  32.4*  33.5*  33.9   RDW  49.0  49.1  47.4   PLATELETCT  134*  140*  116*   MPV  10.3  10.6  10.7     Recent Labs      03/09/17   0900   NEUTSPOLYS  88.00*   LYMPHOCYTES  3.70*   MONOCYTES  7.70   EOSINOPHILS  0.00   BASOPHILS  0.20     Recent Labs      03/10/17   0600  17   0405  17   0455   SODIUM  133*  131*  131*   POTASSIUM  4.5  4.1  4.0   CHLORIDE  101  97  96   CO2  26  28  28   GLUCOSE  110*  69  105*   BUN  29*  29*  25*   CREATININE  1.37  1.31  1.09   CALCIUM  8.2*  8.4*  8.5     Recent Labs      03/10/17   0600  17    0405  03/12/17   0455   INR  1.21*  1.10  1.42*       Medications:  • metformin  500 mg     • metoprolol  25 mg     • furosemide  40 mg     • potassium chloride SA  20 mEq     • amiodarone  400 mg     • tamsulosin  0.4 mg     • docusate sodium  100 mg      And   • senna-docusate  1 Tab     • aspirin EC  81 mg         Exam:   Review of Systems   Constitutional: Negative.    HENT: Negative.    Eyes: Negative.    Respiratory: Negative.    Cardiovascular: Negative.    Gastrointestinal: Negative.    Genitourinary: Negative.    Musculoskeletal: Negative.    Skin: Negative.    Neurological: Negative.    Endo/Heme/Allergies: Negative.    Psychiatric/Behavioral: Negative.        Physical Exam   Constitutional: He is oriented to person, place, and time. He appears well-developed.   obese   HENT:   Head: Normocephalic.   Eyes: Conjunctivae are normal. Pupils are equal, round, and reactive to light.   Neck: Normal range of motion. No JVD present.   Cardiovascular: Normal rate.  Exam reveals friction rub.    paced   Pulmonary/Chest: Effort normal and breath sounds normal.   Dim bases   Abdominal: Soft. Bowel sounds are normal. He exhibits no distension.   Genitourinary: Penis normal.   grover   Musculoskeletal: Normal range of motion. He exhibits edema.   Neurological: He is alert and oriented to person, place, and time.   Skin: Skin is warm and dry.   Surgical incisions CDI   Psychiatric: He has a normal mood and affect. His behavior is normal.       Quality Measures:   Medications reviewed, EKG reviewed, Labs reviewed and Radiology images reviewed  Grover catheter: One or Two Days Post Surgery (Day of Surgery being Day 0)  Central line in place: Need for access and Concentrated IV drugs    DVT Prophylaxis: Contraindicated - High bleeding risk  DVT prophylaxis - mechanical: SCDs  Ulcer prophylaxis: Yes    Assessed for rehab: Patient was assess for and/or received rehabilitation services during this  hospitalization      Assessment/Plan:  POD 1 Paced rate 60, BP borderline low.  CT min output, small airleka noted.  Renal stable. +1.5L, wts up.  PLAN:  Keep CTs, DC grover--diurese when DEEDEE stable.  Hold BB for now.  [place] to interrogate PPM/AICD--increase rates to 80 for improved output.  POD 2 HDS, paced at 80- restart, dc mediastinal tubes, Cr stable- start diuresis, amb, enc IS  POD 3 HDS, paced, diuresing well, Cr stable, DM- start metformin, amb, enc IS, to snf in am  POD 4 HDS, paced, diuresing well, desats at night- most likely ESTELA, DM- well controlled on metformin, very weak- [facility]    Patient seen, examined and plan reviewed with midlevel provider. I agree with the plan.      Active Hospital Problems    Diagnosis   • Mitral valve disorder [I05.9]

## 2017-03-12 NOTE — DISCHARGE PLANNING
Per RN, pt should be cleared to transfer to [facility] tomorrow. KUSH faxed transport form to [facility].

## 2017-03-13 NOTE — CARE PLAN
Problem: Post Op Day 3 CABG/Heart Valve replacement  Goal: Optimal care of the post op CABG/Heart Valve replacement post op day 3  Outcome: PROGRESSING AS EXPECTED  Intervention: Daily Weights  Complete  Intervention: Shower daily and clean incisions twice daily with soap and water  Completed on Day Shift  Intervention: Up in chair for all meals  Completed  Intervention: Ambulate, increasing the distance each time x 3 and before bed  Completed one walk for my shift  Intervention: IS q 1 hour while awake and record best IS volume  Completed  Intervention: Consider removal of grover, chest tube and pacer wire if not already done  Completed  Intervention: Case management and  for discharge needs  Completed  Intervention: Discharge planning (See discharge barriers/planning problem on care plan)  Patient to go to long term care

## 2017-03-14 LAB — LV EJECT FRACT  99904: 35

## 2017-03-18 ENCOUNTER — APPOINTMENT (OUTPATIENT)
Dept: RADIOLOGY | Facility: MEDICAL CENTER | Age: 62
End: 2017-03-18
Attending: EMERGENCY MEDICINE
Payer: MEDICARE

## 2017-03-18 ENCOUNTER — RESOLUTE PROFESSIONAL BILLING HOSPITAL PROF FEE (OUTPATIENT)
Dept: HOSPITALIST | Facility: MEDICAL CENTER | Age: 62
End: 2017-03-18
Payer: MEDICARE

## 2017-03-18 ENCOUNTER — HOSPITAL ENCOUNTER (OUTPATIENT)
Facility: MEDICAL CENTER | Age: 62
End: 2017-03-19
Attending: EMERGENCY MEDICINE | Admitting: HOSPITALIST
Payer: MEDICARE

## 2017-03-18 DIAGNOSIS — Z95.2 H/O MITRAL VALVE REPLACEMENT: ICD-10-CM

## 2017-03-18 DIAGNOSIS — R07.9 ACUTE CHEST PAIN: ICD-10-CM

## 2017-03-18 LAB
ALBUMIN SERPL BCP-MCNC: 3 G/DL (ref 3.2–4.9)
ALBUMIN/GLOB SERPL: 1.4 G/DL
ALP SERPL-CCNC: 56 U/L (ref 30–99)
ALT SERPL-CCNC: 10 U/L (ref 2–50)
ANION GAP SERPL CALC-SCNC: 5 MMOL/L (ref 0–11.9)
AST SERPL-CCNC: 15 U/L (ref 12–45)
BASOPHILS # BLD AUTO: 0 % (ref 0–1.8)
BASOPHILS # BLD: 0 K/UL (ref 0–0.12)
BILIRUB SERPL-MCNC: 0.5 MG/DL (ref 0.1–1.5)
BNP SERPL-MCNC: 113 PG/ML (ref 0–100)
BUN SERPL-MCNC: 15 MG/DL (ref 8–22)
CALCIUM SERPL-MCNC: 8 MG/DL (ref 8.5–10.5)
CHLORIDE SERPL-SCNC: 102 MMOL/L (ref 96–112)
CO2 SERPL-SCNC: 29 MMOL/L (ref 20–33)
CREAT SERPL-MCNC: 1.34 MG/DL (ref 0.5–1.4)
EKG IMPRESSION: NORMAL
EOSINOPHIL # BLD AUTO: 0.09 K/UL (ref 0–0.51)
EOSINOPHIL NFR BLD: 0.9 % (ref 0–6.9)
ERYTHROCYTE [DISTWIDTH] IN BLOOD BY AUTOMATED COUNT: 47.8 FL (ref 35.9–50)
GFR SERPL CREATININE-BSD FRML MDRD: 54 ML/MIN/1.73 M 2
GLOBULIN SER CALC-MCNC: 2.1 G/DL (ref 1.9–3.5)
GLUCOSE BLD-MCNC: 130 MG/DL (ref 65–99)
GLUCOSE SERPL-MCNC: 80 MG/DL (ref 65–99)
HCT VFR BLD AUTO: 36.7 % (ref 42–52)
HGB BLD-MCNC: 12 G/DL (ref 14–18)
INR PPP: 1.64 (ref 0.87–1.13)
LG PLATELETS BLD QL SMEAR: NORMAL
LIPASE SERPL-CCNC: 15 U/L (ref 11–82)
LV EJECT FRACT  99904: 60
LV EJECT FRACT MOD 2C 99903: 65.58
LV EJECT FRACT MOD 4C 99902: 56.88
LV EJECT FRACT MOD BP 99901: 60.42
LYMPHOCYTES # BLD AUTO: 0.53 K/UL (ref 1–4.8)
LYMPHOCYTES NFR BLD: 5.3 % (ref 22–41)
MANUAL DIFF BLD: NORMAL
MCH RBC QN AUTO: 30.9 PG (ref 27–33)
MCHC RBC AUTO-ENTMCNC: 32.7 G/DL (ref 33.7–35.3)
MCV RBC AUTO: 94.6 FL (ref 81.4–97.8)
METAMYELOCYTES NFR BLD MANUAL: 1.8 %
MONOCYTES # BLD AUTO: 0.35 K/UL (ref 0–0.85)
MONOCYTES NFR BLD AUTO: 3.5 % (ref 0–13.4)
MORPHOLOGY BLD-IMP: NORMAL
MYELOCYTES NFR BLD MANUAL: 0.9 %
NEUTROPHILS # BLD AUTO: 8.76 K/UL (ref 1.82–7.42)
NEUTROPHILS NFR BLD: 87.6 % (ref 44–72)
NRBC # BLD AUTO: 0 K/UL
NRBC BLD AUTO-RTO: 0 /100 WBC
PLATELET # BLD AUTO: 170 K/UL (ref 164–446)
PLATELET BLD QL SMEAR: NORMAL
PMV BLD AUTO: 10 FL (ref 9–12.9)
POTASSIUM SERPL-SCNC: 4.1 MMOL/L (ref 3.6–5.5)
PROT SERPL-MCNC: 5.1 G/DL (ref 6–8.2)
PROTHROMBIN TIME: 19.9 SEC (ref 12–14.6)
RBC # BLD AUTO: 3.88 M/UL (ref 4.7–6.1)
RBC BLD AUTO: PRESENT
SODIUM SERPL-SCNC: 136 MMOL/L (ref 135–145)
TOXIC GRANULES BLD QL SMEAR: SLIGHT
TROPONIN I SERPL-MCNC: 0.01 NG/ML (ref 0–0.04)
TROPONIN I SERPL-MCNC: <0.01 NG/ML (ref 0–0.04)
TROPONIN I SERPL-MCNC: <0.01 NG/ML (ref 0–0.04)
WBC # BLD AUTO: 10 K/UL (ref 4.8–10.8)

## 2017-03-18 PROCEDURE — G0378 HOSPITAL OBSERVATION PER HR: HCPCS

## 2017-03-18 PROCEDURE — 36415 COLL VENOUS BLD VENIPUNCTURE: CPT

## 2017-03-18 PROCEDURE — 83880 ASSAY OF NATRIURETIC PEPTIDE: CPT

## 2017-03-18 PROCEDURE — 700102 HCHG RX REV CODE 250 W/ 637 OVERRIDE(OP): Performed by: HOSPITALIST

## 2017-03-18 PROCEDURE — 80053 COMPREHEN METABOLIC PANEL: CPT

## 2017-03-18 PROCEDURE — A9270 NON-COVERED ITEM OR SERVICE: HCPCS

## 2017-03-18 PROCEDURE — 93306 TTE W/DOPPLER COMPLETE: CPT | Mod: 26 | Performed by: INTERNAL MEDICINE

## 2017-03-18 PROCEDURE — 82962 GLUCOSE BLOOD TEST: CPT

## 2017-03-18 PROCEDURE — 99220 PR INITIAL OBSERVATION CARE,LEVL III: CPT | Performed by: HOSPITALIST

## 2017-03-18 PROCEDURE — 84484 ASSAY OF TROPONIN QUANT: CPT

## 2017-03-18 PROCEDURE — 700111 HCHG RX REV CODE 636 W/ 250 OVERRIDE (IP): Performed by: HOSPITALIST

## 2017-03-18 PROCEDURE — 85007 BL SMEAR W/DIFF WBC COUNT: CPT

## 2017-03-18 PROCEDURE — 71275 CT ANGIOGRAPHY CHEST: CPT

## 2017-03-18 PROCEDURE — 93306 TTE W/DOPPLER COMPLETE: CPT

## 2017-03-18 PROCEDURE — 85027 COMPLETE CBC AUTOMATED: CPT

## 2017-03-18 PROCEDURE — 304562 HCHG STAT O2 MASK/CANNULA

## 2017-03-18 PROCEDURE — 85610 PROTHROMBIN TIME: CPT

## 2017-03-18 PROCEDURE — 700102 HCHG RX REV CODE 250 W/ 637 OVERRIDE(OP)

## 2017-03-18 PROCEDURE — 83690 ASSAY OF LIPASE: CPT

## 2017-03-18 PROCEDURE — 96374 THER/PROPH/DIAG INJ IV PUSH: CPT

## 2017-03-18 PROCEDURE — 304561 HCHG STAT O2

## 2017-03-18 PROCEDURE — 93005 ELECTROCARDIOGRAM TRACING: CPT

## 2017-03-18 PROCEDURE — 99285 EMERGENCY DEPT VISIT HI MDM: CPT

## 2017-03-18 PROCEDURE — 700117 HCHG RX CONTRAST REV CODE 255: Performed by: EMERGENCY MEDICINE

## 2017-03-18 PROCEDURE — A9270 NON-COVERED ITEM OR SERVICE: HCPCS | Performed by: HOSPITALIST

## 2017-03-18 RX ORDER — MORPHINE SULFATE 4 MG/ML
1-2 INJECTION, SOLUTION INTRAMUSCULAR; INTRAVENOUS EVERY 4 HOURS PRN
Status: DISCONTINUED | OUTPATIENT
Start: 2017-03-18 | End: 2017-03-19 | Stop reason: HOSPADM

## 2017-03-18 RX ORDER — BENZONATATE 100 MG/1
100 CAPSULE ORAL 3 TIMES DAILY PRN
COMMUNITY
End: 2017-04-03

## 2017-03-18 RX ORDER — PHYTONADIONE 2 MG/ML
5 INJECTION, EMULSION INTRAMUSCULAR; INTRAVENOUS; SUBCUTANEOUS ONCE
COMMUNITY
End: 2017-04-03

## 2017-03-18 RX ORDER — POLYETHYLENE GLYCOL 3350 17 G/17G
17 POWDER, FOR SOLUTION ORAL DAILY
COMMUNITY
End: 2017-04-03

## 2017-03-18 RX ORDER — POTASSIUM CHLORIDE 20 MEQ/1
20 TABLET, EXTENDED RELEASE ORAL DAILY
Status: DISCONTINUED | OUTPATIENT
Start: 2017-03-19 | End: 2017-03-19 | Stop reason: HOSPADM

## 2017-03-18 RX ORDER — ONDANSETRON 4 MG/1
4 TABLET, ORALLY DISINTEGRATING ORAL EVERY 4 HOURS PRN
Status: DISCONTINUED | OUTPATIENT
Start: 2017-03-18 | End: 2017-03-19 | Stop reason: HOSPADM

## 2017-03-18 RX ORDER — ONDANSETRON 4 MG/1
4 TABLET, ORALLY DISINTEGRATING ORAL EVERY 6 HOURS PRN
COMMUNITY
End: 2017-04-03

## 2017-03-18 RX ORDER — INSULIN GLARGINE 100 [IU]/ML
10 INJECTION, SOLUTION SUBCUTANEOUS 2 TIMES DAILY
COMMUNITY
End: 2017-04-03

## 2017-03-18 RX ORDER — AMIODARONE HYDROCHLORIDE 200 MG/1
400 TABLET ORAL DAILY
Status: DISCONTINUED | OUTPATIENT
Start: 2017-03-19 | End: 2017-03-19 | Stop reason: HOSPADM

## 2017-03-18 RX ORDER — ONDANSETRON 2 MG/ML
4 INJECTION INTRAMUSCULAR; INTRAVENOUS EVERY 4 HOURS PRN
Status: DISCONTINUED | OUTPATIENT
Start: 2017-03-18 | End: 2017-03-19 | Stop reason: HOSPADM

## 2017-03-18 RX ORDER — BISACODYL 10 MG
10 SUPPOSITORY, RECTAL RECTAL ONCE
COMMUNITY
End: 2017-04-03

## 2017-03-18 RX ORDER — PROMETHAZINE HYDROCHLORIDE 25 MG/1
12.5-25 TABLET ORAL EVERY 4 HOURS PRN
Status: DISCONTINUED | OUTPATIENT
Start: 2017-03-18 | End: 2017-03-19 | Stop reason: HOSPADM

## 2017-03-18 RX ORDER — FUROSEMIDE 40 MG/1
40 TABLET ORAL DAILY
Status: DISCONTINUED | OUTPATIENT
Start: 2017-03-19 | End: 2017-03-19

## 2017-03-18 RX ORDER — LANOLIN ALCOHOL/MO/W.PET/CERES
3 CREAM (GRAM) TOPICAL
COMMUNITY
End: 2017-04-03

## 2017-03-18 RX ORDER — WARFARIN SODIUM 7.5 MG/1
7.5 TABLET ORAL
Status: DISCONTINUED | OUTPATIENT
Start: 2017-03-18 | End: 2017-03-19 | Stop reason: HOSPADM

## 2017-03-18 RX ORDER — PROMETHAZINE HYDROCHLORIDE 25 MG/1
12.5-25 SUPPOSITORY RECTAL EVERY 4 HOURS PRN
Status: DISCONTINUED | OUTPATIENT
Start: 2017-03-18 | End: 2017-03-19 | Stop reason: HOSPADM

## 2017-03-18 RX ORDER — WARFARIN SODIUM 5 MG/1
5 TABLET ORAL
Status: DISCONTINUED | OUTPATIENT
Start: 2017-03-20 | End: 2017-03-19 | Stop reason: HOSPADM

## 2017-03-18 RX ORDER — TEMAZEPAM 15 MG/1
15 CAPSULE ORAL NIGHTLY PRN
COMMUNITY
End: 2017-04-03

## 2017-03-18 RX ORDER — TAMSULOSIN HYDROCHLORIDE 0.4 MG/1
0.4 CAPSULE ORAL
Status: DISCONTINUED | OUTPATIENT
Start: 2017-03-19 | End: 2017-03-19 | Stop reason: HOSPADM

## 2017-03-18 RX ORDER — LACTULOSE 10 G/15ML
20 SOLUTION ORAL 3 TIMES DAILY
COMMUNITY
End: 2017-04-03

## 2017-03-18 RX ORDER — NITROGLYCERIN 0.4 MG/1
0.4 TABLET SUBLINGUAL
Status: DISCONTINUED | OUTPATIENT
Start: 2017-03-18 | End: 2017-03-19 | Stop reason: HOSPADM

## 2017-03-18 RX ORDER — DEXTROMETHORPHAN HBR. AND GUAIFENESIN 10; 100 MG/5ML; MG/5ML
10 SOLUTION ORAL EVERY 6 HOURS PRN
COMMUNITY
End: 2017-04-03

## 2017-03-18 RX ADMIN — MORPHINE SULFATE 1 MG: 4 INJECTION INTRAVENOUS at 20:55

## 2017-03-18 RX ADMIN — WARFARIN SODIUM 7.5 MG: 7.5 TABLET ORAL at 21:27

## 2017-03-18 RX ADMIN — IOHEXOL 100 ML: 350 INJECTION, SOLUTION INTRAVENOUS at 13:54

## 2017-03-18 RX ADMIN — METOPROLOL TARTRATE 25 MG: 25 TABLET, FILM COATED ORAL at 20:55

## 2017-03-18 ASSESSMENT — PAIN SCALES - GENERAL
PAINLEVEL_OUTOF10: 7
PAINLEVEL_OUTOF10: 9
PAINLEVEL_OUTOF10: 5

## 2017-03-18 ASSESSMENT — ENCOUNTER SYMPTOMS
ABDOMINAL PAIN: 0
PALPITATIONS: 1
TINGLING: 1
SHORTNESS OF BREATH: 0

## 2017-03-18 ASSESSMENT — CHA2DS2 SCORE
VASCULAR DISEASE: NO
HYPERTENSION: YES
SEX: MALE
CHA2DS2 VASC SCORE: 4
DIABETES: YES
CHF OR LEFT VENTRICULAR DYSFUNCTION: NO
AGE 75 OR GREATER: NO
PRIOR STROKE OR TIA OR THROMBOEMBOLISM: YES
AGE 65 TO 74: NO

## 2017-03-18 ASSESSMENT — LIFESTYLE VARIABLES
DO YOU DRINK ALCOHOL: NO
ALCOHOL_USE: NO
EVER_SMOKED: YES

## 2017-03-18 NOTE — IP AVS SNAPSHOT
MocoSpace Access Code: Activation code not generated  Current MocoSpace Status: Patient Declined    Your email address is not on file at [facility].  Email Addresses are required for you to sign up for MocoSpace, please contact [phone] to verify your personal information and to provide your email address prior to attempting to register for MocoSpace.    Morales Olivier  [address]  [place] [address]    [employer]  A secure, online tool to manage your health information     [facility]’s MocoSpace® is a secure, online tool that connects you to your personalized health information from the privacy of your home -- day or night - making it very easy for you to manage your healthcare. Once the activation process is completed, you can even access your medical information using the MocoSpace moreno, which is available for free in the Apple Moreno store or Google Play store.     To learn more about MocoSpace, visit [email or web address]    There are two levels of access available (as shown below):   My Chart Features  [facility] Primary Care Doctor [facility]  Specialists [facility]  Urgent  Care Non-[facility] Primary Care Doctor   Email your healthcare team securely and privately 24/7 X X X    Manage appointments: schedule your next appointment; view details of past/upcoming appointments X      Request prescription refills. X      View recent personal medical records, including lab and immunizations X X X X   View health record, including health history, allergies, medications X X X X   Read reports about your outpatient visits, procedures, consult and ER notes X X X X   See your discharge summary, which is a recap of your hospital and/or ER visit that includes your diagnosis, lab results, and care plan X X  X     How to register for [employer]:  Once your e-mail address has been verified, follow the following steps to sign up for [employer].     1. Go to  [email or web address]  2. Click on the Sign Up Now box, which takes you to the New Member Sign Up  page. You will need to provide the following information:  a. Enter your [employer] Access Code exactly as it appears at the top of this page. (You will not need to use this code after you’ve completed the sign-up process. If you do not sign up before the expiration date, you must request a new code.)   b. Enter your date of birth.   c. Enter your home email address.   d. Click Submit, and follow the next screen’s instructions.  3. Create a [employer] ID. This will be your [employer] login ID and cannot be changed, so think of one that is secure and easy to remember.  4. Create a [employer] password. You can change your password at any time.  5. Enter your Password Reset Question and Answer. This can be used at a later time if you forget your password.   6. Enter your e-mail address. This allows you to receive e-mail notifications when new information is available in [employer].  7. Click Sign Up. You can now view your health information.    For assistance activating your [employer] account, call [phone]

## 2017-03-18 NOTE — IP AVS SNAPSHOT
3/19/2017          Morales Olivier  [address]  [place] [address]    Dear Morales:    [facility] wants to ensure your discharge home is safe and you or your loved ones have had all your questions answered regarding your care after you leave the hospital.    You may receive a telephone call within two days of your discharge.  This call is to make certain you understand your discharge instructions as well as ensure we provided you with the best care possible during your stay with us.     The call will only last approximately 3-5 minutes and will be done by a nurse.    Once again, we want to ensure your discharge home is safe and that you have a clear understanding of any next steps in your care.  If you have any questions or concerns, please do not hesitate to contact us, we are here for you.  Thank you for choosing [facility] for your healthcare needs.    Sincerely,    Maninder Reveles    [facility]

## 2017-03-18 NOTE — ED NOTES
Chief Complaint   Patient presents with   • Chest Pain     pt bib remsa from [facility] facility c/o mid chest pain describes as pressure/left arm pain since this am while laying down. was given morphine 0.4mg/1 nitro tablet at [facility] and asa 324mg and 1 nitro spray by ems w/minimal relief.     Pt has Mitral Valve Replacement done on 3/8. Incision noted healing, no gapping noted, no drainage noted.

## 2017-03-18 NOTE — IP AVS SNAPSHOT
" <p align=\"LEFT\"><IMG SRC=\"//[email or web address]\" alt=\"Image\" WIDTH=\"50%\" HEIGHT=\"200\" BORDER=\"\"></p>                   Name:Morales Olivier  Medical Record Number:[id number]  CSN: [id number]    YOB: 1955   Age: 61 y.o.  Sex: male  HT:1.8 m (5' 10.87\") WT: 118 kg (260 lb 2.3 oz)          Admit Date: 3/18/2017     Discharge Date:   Today's Date: 3/19/2017  Attending Doctor:  [facility]                  Allergies:  Review of patient's allergies indicates no known allergies.          Your appointments     Apr 19, 2017 11:20 AM   FOLLOW UP with Sumanth Yancey M.D.   [facility] (--)    [address], [address]  [place] [id number] [phone]              Follow-up Information     1. Follow up with Nelia Huber M.D. In 1 week.    Specialty:  Family Medicine    Why:  after d/c from [facility]    Contact information    [address]  [address]  [place] [address]  [phone]           Medication List      Take these Medications        Instructions    albuterol 108 (90 BASE) MCG/ACT Aers inhalation aerosol    Inhale 2 Puffs by mouth every 6 hours as needed for Shortness of Breath.   Dose:  2 Puff       amiodarone 400 MG tablet   Commonly known as:  PACERONE    Take 1 Tab by mouth every day.   Dose:  400 mg       aspirin 81 MG EC tablet    Take 1 Tab by mouth every day.   Dose:  81 mg       benzonatate 100 MG Caps   Commonly known as:  TESSALON    Take 100 mg by mouth 3 times a day as needed for Cough.   Dose:  100 mg       bisacodyl 10 MG Supp   Commonly known as:  DULCOLAX    Insert 10 mg in rectum Once.   Dose:  10 mg       furosemide 40 MG Tabs   Commonly known as:  LASIX    Take 1 Tab by mouth every day.   Dose:  40 mg       guaifenesin DM sugar free  MG/5ML Liqd soln   Commonly known as:  DIABETIC TUSSIN DM    Take 10 mL by mouth every 6 hours as needed for Cough.   Dose:  10 mL       hydrocodone-acetaminophen 5-325 MG Tabs per tablet   Commonly known " as:  NORCO    Take 1-2 Tabs by mouth every 6 hours as needed.   Dose:  1-2 Tab       insulin glargine 100 UNIT/ML Soln   Commonly known as:  LANTUS    Inject 10 Units as instructed 2 times a day.   Dose:  10 Units       insulin lispro 100 UNIT/ML Soln   Commonly known as:  HUMALOG    Inject 0-10 Units as instructed 3 times a day before meals. Sliding Scale: 151-200 = 2 units 201-250 = 4 units 251-300 = 6 units 301-350 = 8 units 351-400 = 10 units   Dose:  0-10 Units       lactulose 10 GM/15ML Soln    Take 20 g by mouth 3 times a day.   Dose:  20 g       melatonin 3 MG Tabs    Take 3 mg by mouth every bedtime.   Dose:  3 mg       metformin 500 MG Tabs   Commonly known as:  GLUCOPHAGE    Take 1 Tab by mouth 2 times a day, with meals.   Dose:  500 mg       metoprolol 25 MG Tabs   Commonly known as:  LOPRESSOR    Take 1 Tab by mouth 2 Times a Day.   Dose:  25 mg       ondansetron 4 MG Tbdp   Commonly known as:  ZOFRAN ODT    Take 4 mg by mouth every 6 hours as needed for Nausea/Vomiting.   Dose:  4 mg       phytonadione 1 MG/0.5ML Soln   Commonly known as:  AQUA-MEPHYTON    5 mg by Intramuscular route Once.   Dose:  5 mg       polyethylene glycol/lytes Pack   Commonly known as:  MIRALAX    Take 17 g by mouth every day.   Dose:  17 g       potassium chloride SA 20 MEQ Tbcr   Commonly known as:  Kdur    Take 1 Tab by mouth every day.   Dose:  20 mEq       tamsulosin 0.4 MG capsule   Commonly known as:  FLOMAX    Take 0.4 mg by mouth ONE-HALF HOUR AFTER BREAKFAST.   Dose:  0.4 mg       temazepam 15 MG Caps   Commonly known as:  RESTORIL    Take 15 mg by mouth at bedtime as needed for Sleep.   Dose:  15 mg       warfarin 7.5 MG Tabs   Commonly known as:  COUMADIN    Take 1 Tab by mouth every day. Titrate to INR 2-3.   Dose:  7.5 mg

## 2017-03-18 NOTE — IP AVS SNAPSHOT
" Home Care Instructions                                                                                                                  Name:Morales Olivier  Medical Record Number:[id number]  CSN: [id number]    YOB: 1955   Age: 61 y.o.  Sex: male  HT:1.8 m (5' 10.87\") WT: 118 kg (260 lb 2.3 oz)          Admit Date: 3/18/2017     Discharge Date:   Today's Date: 3/19/2017  Attending Doctor:  [facility]                  Allergies:  Review of patient's allergies indicates no known allergies.            Discharge Instructions       Discharge Instructions    Discharged to other by medical transportation with escort. Discharged via wheelchair, hospital escort: Yes.  Special equipment needed: Oxygen    Be sure to schedule a follow-up appointment with your primary care doctor or any specialists as instructed.     Discharge Plan: Follow up with Dr. Huber in 1 week following discharge from care facility   Continue home medications as previously prescribed   Return to the ER or contact the primary care physician immediately with any concerns or symptoms including but not limited to  fever, chills, loss of appetite, weight loss, chest pain (outside of expected post-surgical pain), shortness of breath, fatigue, nausea, vomiting, diarrhea, bleeding, the development of any new wounds or lesions or any developing redness, swelling or warmth in any existing wounds or lesions  Diet Plan: Discussed  Activity Level: Discussed  Confirmed Follow up Appointment: Patient to Call and Schedule Appointment  Confirmed Symptoms Management: Discussed  Medication Reconciliation Updated: Yes  Influenza Vaccine Indication: Patient Refuses    I understand that a diet low in cholesterol, fat, and sodium is recommended for good health. Unless I have been given specific instructions below for another diet, I accept this instruction as my diet prescription.   Other diet: Heart healthy diet    Special Instructions: None    · Is patient " "discharged on Warfarin / Coumadin?   Yes    You are receiving the drug warfarin. Please understand the importance of monitoring warfarin with scheduled PT/INR blood draws.  Follow-up with a call to your personal Doctor's office in 3 days to schedule a PT/INR. .    IMPORTANT: HOW TO USE THIS INFORMATION:  This is a summary and does NOT have all possible information about this product. This information does not assure that this product is safe, effective, or appropriate for you. This information is not individual medical advice and does not substitute for the advice of your health care professional. Always ask your health care professional for complete information about this product and your specific health needs.      WARFARIN - ORAL (WARF-uh-rin)      COMMON BRAND NAME(S): Coumadin      WARNING:  Warfarin can cause very serious (possibly fatal) bleeding. This is more likely to occur when you first start taking this medication or if you take too much warfarin. To decrease your risk for bleeding, your doctor or other health care provider will monitor you closely and check your lab results (INR test) to make sure you are not taking too much warfarin. Keep all medical and laboratory appointments. Tell your doctor right away if you notice any signs of serious bleeding. See also Side Effects section.      USES:  This medication is used to treat blood clots (such as in deep vein thrombosis-DVT or pulmonary embolus-PE) and/or to prevent new clots from forming in your body. Preventing harmful blood clots helps to reduce the risk of a stroke or heart attack. Conditions that increase your risk of developing blood clots include a certain type of irregular heart rhythm (atrial fibrillation), heart valve replacement, recent heart attack, and certain surgeries (such as hip/knee replacement). Warfarin is commonly called a \"blood thinner,\" but the more correct term is \"anticoagulant.\" It helps to keep blood flowing smoothly in your " body by decreasing the amount of certain substances (clotting proteins) in your blood.      HOW TO USE:  Read the Medication Guide provided by your pharmacist before you start taking warfarin and each time you get a refill. If you have any questions, ask your doctor or pharmacist. Take this medication by mouth with or without food as directed by your doctor or other health care professional, usually once a day. It is very important to take it exactly as directed. Do not increase the dose, take it more frequently, or stop using it unless directed by your doctor. Dosage is based on your medical condition, laboratory tests (such as INR), and response to treatment. Your doctor or other health care provider will monitor you closely while you are taking this medication to determine the right dose for you. Use this medication regularly to get the most benefit from it. To help you remember, take it at the same time each day. It is important to eat a balanced, consistent diet while taking warfarin. Some foods can affect how warfarin works in your body and may affect your treatment and dose. Avoid sudden large increases or decreases in your intake of foods high in vitamin K (such as broccoli, cauliflower, cabbage, brussels sprouts, kale, spinach, and other green leafy vegetables, liver, green tea, certain vitamin supplements). If you are trying to lose weight, check with your doctor before you try to go on a diet. Cranberry products may also affect how your warfarin works. Limit the amount of cranberry juice (16 ounces/480 milliliters a day) or other cranberry products you may drink or eat.      SIDE EFFECTS:  Nausea, loss of appetite, or stomach/abdominal pain may occur. If any of these effects persist or worsen, tell your doctor or pharmacist promptly. Remember that your doctor has prescribed this medication because he or she has judged that the benefit to you is greater than the risk of side effects. Many people using this  medication do not have serious side effects. This medication can cause serious bleeding if it affects your blood clotting proteins too much (shown by unusually high INR lab results). Even if your doctor stops your medication, this risk of bleeding can continue for up to a week. Tell your doctor right away if you have any signs of serious bleeding, including: unusual pain/swelling/discomfort, unusual/easy bruising, prolonged bleeding from cuts or gums, persistent/frequent nosebleeds, unusually heavy/prolonged menstrual flow, pink/dark urine, coughing up blood, vomit that is bloody or looks like coffee grounds, severe headache, dizziness/fainting, unusual or persistent tiredness/weakness, bloody/black/tarry stools, chest pain, shortness of breath, difficulty swallowing. Tell your doctor right away if any of these unlikely but serious side effects occur: persistent nausea/vomiting, severe stomach/abdominal pain, yellowing eyes/skin. This drug rarely has caused very serious (possibly fatal) problems if its effects lead to small blood clots (usually at the beginning of treatment). This can lead to severe skin/tissue damage that may require surgery or amputation if left untreated. Patients with certain blood conditions (protein C or S deficiency) may be at greater risk. Get medical help right away if any of these rare but serious side effects occur: painful/red/purplish patches on the skin (such as on the toe, breast, abdomen), change in the amount of urine, vision changes, confusion, slurred speech, weakness on one side of the body. A very serious allergic reaction to this drug is rare. However, get medical help right away if you notice any symptoms of a serious allergic reaction, including: rash, itching/swelling (especially of the face/tongue/throat), severe dizziness, trouble breathing. This is not a complete list of possible side effects. If you notice other effects not listed above, contact your doctor or  pharmacist. In the [place] - Call your doctor for medical advice about side effects. You may report side effects to FDA at 1-001-HVQ-3974. In [place] - Call your doctor for medical advice about side effects. You may report side effects to [facility] [place] at 1-717.637.5971.      PRECAUTIONS:  Before taking warfarin, tell your doctor or pharmacist if you are allergic to it; or if you have any other allergies. This product may contain inactive ingredients, which can cause allergic reactions or other problems. Talk to your pharmacist for more details. Before using this medication, tell your doctor or pharmacist your medical history, especially of: blood disorders (such as anemia, hemophilia), bleeding problems (such as bleeding of the stomach/intestines, bleeding in the brain), blood vessel disorders (such as aneurysms), recent major injury/surgery, liver disease, alcohol use, mental/mood disorders (including memory problems), frequent falls/injuries. It is important that all your doctors and dentists know that you take warfarin. Before having surgery or any medical/dental procedures, tell your doctor or dentist that you are taking this medication and about all the products you use (including prescription drugs, nonprescription drugs, and herbal products). Avoid getting injections into the muscles. If you must have an injection into a muscle (for example, a flu shot), it should be given in the arm. This way, it will be easier to check for bleeding and/or apply pressure bandages. This medication may cause stomach bleeding. Daily use of alcohol while using this medicine will increase your risk for stomach bleeding and may also affect how this medication works. Limit or avoid alcoholic beverages. If you have not been eating well, if you have an illness or infection that causes fever, vomiting, or diarrhea for more than 2 days, or if you start using any antibiotic medications, contact your doctor or pharmacist immediately because  these conditions can affect how warfarin works. This medication can cause heavy bleeding. To lower the chance of getting cut, bruised, or injured, use great caution with sharp objects like safety razors and nail cutters. Use an electric razor when shaving and a soft toothbrush when brushing your teeth. Avoid activities such as contact sports. If you fall or injure yourself, especially if you hit your head, call your doctor immediately. Your doctor may need to check you. The Food & Drug Administration has stated that generic warfarin products are interchangeable. However, consult your doctor or pharmacist before switching warfarin products. Be careful not to take more than one medication that contains warfarin unless specifically directed by the doctor or health care provider who is monitoring your warfarin treatment. Older adults may be at greater risk for bleeding while using this drug. This medication is not recommended for use during pregnancy because of serious (possibly fatal) harm to an unborn baby. Discuss the use of reliable forms of birth control with your doctor. If you become pregnant or think you may be pregnant, tell your doctor immediately. If you are planning pregnancy, discuss a plan for managing your condition with your doctor before you become pregnant. Your doctor may switch the type of medication you use during pregnancy. Very small amounts of this medication may pass into breast milk but is unlikely to harm a nursing infant. Consult your doctor before breast-feeding.      DRUG INTERACTIONS:  Drug interactions may change how your medications work or increase your risk for serious side effects. This document does not contain all possible drug interactions. Keep a list of all the products you use (including prescription/nonprescription drugs and herbal products) and share it with your doctor and pharmacist. Do not start, stop, or change the dosage of any medicines without your doctor's approval.  "Warfarin interacts with many prescription, nonprescription, vitamin, and herbal products. This includes medications that are applied to the skin or inside the vagina or rectum. The interactions with warfarin usually result in an increase or decrease in the \"blood-thinning\" (anticoagulant) effect. Your doctor or other health care professional should closely monitor you to prevent serious bleeding or clotting problems. While taking warfarin, it is very important to tell your doctor or pharmacist of any changes in medications, vitamins, or herbal products that you are taking. Some products that may interact with this drug include: capecitabine, imatinib, mifepristone. Aspirin, aspirin-like drugs (salicylates), and nonsteroidal anti-inflammatory drugs (NSAIDs such as ibuprofen, naproxen, celecoxib) may have effects similar to warfarin. These drugs may increase the risk of bleeding problems if taken during treatment with warfarin. Carefully check all prescription/nonprescription product labels (including drugs applied to the skin such as pain-relieving creams) since the products may contain NSAIDs or salicylates. Talk to your doctor about using a different medication (such as acetaminophen) to treat pain/fever. Low-dose aspirin and related drugs (such as clopidogrel, ticlopidine) should be continued if prescribed by your doctor for specific medical reasons such as heart attack or stroke prevention. Consult your doctor or pharmacist for more details. Many herbal products interact with warfarin. Tell your doctor before taking any herbal products, especially bromelains, coenzyme Q10, cranberry, danshen, dong quai, fenugreek, garlic, ginkgo biloba, ginseng, and Thong's wort, among others. This medication may interfere with a certain laboratory test to measure theophylline levels, possibly causing false test results. Make sure laboratory personnel and all your doctors know you use this drug.      OVERDOSE:  If overdose is " suspected, contact a poison control center or emergency room immediately. [place] residents can call the [place] National Poison Hotline at 1-354.423.1931. [place] residents can call a provincial poison control center. Symptoms of overdose may include: bloody/black/tarry stools, pink/dark urine, unusual/prolonged bleeding.      NOTES:  Do not share this medication with others. Laboratory and/or medical tests (such as INR, complete blood count) must be performed periodically to monitor your progress or check for side effects. Consult your doctor for more details.      MISSED DOSE:  For the best possible benefit, do not miss any doses. If you do miss a dose and remember on the same day, take it as soon as you remember. If you remember on the next day, skip the missed dose and resume your usual dosing schedule. Do not double the dose to catch up because this could increase your risk for bleeding. Keep a record of missed doses to give to your doctor or pharmacist. Contact your doctor or pharmacist if you miss 2 or more doses in a row.      STORAGE:  Store at room temperature away from light and moisture. Do not store in the bathroom. Keep all medications away from children and pets. Do not flush medications down the toilet or pour them into a drain unless instructed to do so. Properly discard this product when it is  or no longer needed. Consult your pharmacist or local waste disposal company for more details about how to safely discard your product.      MEDICAL ALERT:  Your condition and medication can cause complications in a medical emergency. For information about enrolling in MedicAlert, call 1-518.843.2309 ([place]) or 1-960.422.7555 ([place]).      Information last revised 2010 Copyright(c) 2010 [employer]             · Is patient Post Blood Transfusion?  No    Depression / Suicide Risk    As you are discharged from this [facility], it is important to learn how to keep safe from harming  yourself.    Recognize the warning signs:  · Abrupt changes in personality, positive or negative- including increase in energy   · Giving away possessions  · Change in eating patterns- significant weight changes-  positive or negative  · Change in sleeping patterns- unable to sleep or sleeping all the time   · Unwillingness or inability to communicate  · Depression  · Unusual sadness, discouragement and loneliness  · Talk of wanting to die  · Neglect of personal appearance   · Rebelliousness- reckless behavior  · Withdrawal from people/activities they love  · Confusion- inability to concentrate     If you or a loved one observes any of these behaviors or has concerns about self-harm, here's what you can do:  · Talk about it- your feelings and reasons for harming yourself  · Remove any means that you might use to hurt yourself (examples: pills, rope, extension cords, firearm)  · Get professional help from the community (Mental Health, Substance Abuse, psychological counseling)  · Do not be alone:Call your Safe Contact- someone whom you trust who will be there for you.  · Call your local CRISIS HOTLINE 778-1749 or 962-178-2462  · Call your local Children's Mobile Crisis Response Team [place] (897) 545-7841 or www.[employer]  · Call the toll free National Suicide Prevention Hotlines   · National Suicide Prevention Lifeline 175-360-HRLD (7804)  · National Hope Line Network 800-SUICIDE (621-7089)    Chest Wall Pain  Chest wall pain is pain in or around the bones and muscles of your chest. It may take up to 6 weeks to get better. It may take longer if you must stay physically active in your work and activities.   CAUSES   Chest wall pain may happen on its own. However, it may be caused by:  A viral illness like the flu.  Injury.  Coughing.  Exercise.  Arthritis.  Fibromyalgia.  Shingles.  HOME CARE INSTRUCTIONS   Avoid overtiring physical activity. Try not to strain or perform activities that cause pain. This  includes any activities using your chest or your abdominal and side muscles, especially if heavy weights are used.  Put ice on the sore area.  Put ice in a plastic bag.  Place a towel between your skin and the bag.  Leave the ice on for 15-20 minutes per hour while awake for the first 2 days.  Only take over-the-counter or prescription medicines for pain, discomfort, or fever as directed by your caregiver.  SEEK IMMEDIATE MEDICAL CARE IF:   Your pain increases, or you are very uncomfortable.  You have a fever.  Your chest pain becomes worse.  You have new, unexplained symptoms.  You have nausea or vomiting.  You feel sweaty or lightheaded.  You have a cough with phlegm (sputum), or you cough up blood.  MAKE SURE YOU:   Understand these instructions.  Will watch your condition.  Will get help right away if you are not doing well or get worse.     This information is not intended to replace advice given to you by your health care provider. Make sure you discuss any questions you have with your health care provider.     Document Released: 12/18/2006 Document Revised: 03/11/2013 Document Reviewed: 03/14/2016  Signature Interactive Patient Education ©2016 [employer]      Your appointments     Apr 19, 2017 11:20 AM   FOLLOW UP with Sumanth Yancey M.D.   [facility] (--)    [address], [address]  [place] [id number] [phone]              Follow-up Information     1. Follow up with Nelia Huber M.D. In 1 week.    Specialty:  Family Medicine    Why:  after d/c from [facility]    Contact information    [id number]  [address]  [place] [address]  [phone]           Discharge Medication Instructions:    Below are the medications your physician expects you to take upon discharge:    Review all your home medications and newly ordered medications with your doctor and/or pharmacist. Follow medication instructions as directed by your doctor and/or pharmacist.    Please keep your  medication list with you and share with your physician.               Medication List      CONTINUE taking these medications        Instructions    albuterol 108 (90 BASE) MCG/ACT Aers inhalation aerosol   Next Dose Due:  As needed    Inhale 2 Puffs by mouth every 6 hours as needed for Shortness of Breath.   Dose:  2 Puff       amiodarone 400 MG tablet   Last time this was given:  400 mg on 3/19/2017  8:16 AM   Commonly known as:  PACERONE   Next Dose Due:  Tomorrow 3/20    Take 1 Tab by mouth every day.   Dose:  400 mg       aspirin 81 MG EC tablet   Last time this was given:  81 mg on 3/19/2017  8:16 AM   Next Dose Due:  Tomorrow 3/20    Take 1 Tab by mouth every day.   Dose:  81 mg       benzonatate 100 MG Caps   Commonly known as:  TESSALON   Next Dose Due:  As needed    Take 100 mg by mouth 3 times a day as needed for Cough.   Dose:  100 mg       bisacodyl 10 MG Supp   Commonly known as:  DULCOLAX   Next Dose Due:  As needed    Insert 10 mg in rectum Once.   Dose:  10 mg       furosemide 40 MG Tabs   Last time this was given:  40 mg on 3/19/2017  5:27 AM   Commonly known as:  LASIX   Next Dose Due:  Tomorrow 3/20    Take 1 Tab by mouth every day.   Dose:  40 mg       guaifenesin DM sugar free  MG/5ML Liqd soln   Commonly known as:  DIABETIC TUSSIN DM   Next Dose Due:  As needed    Take 10 mL by mouth every 6 hours as needed for Cough.   Dose:  10 mL       hydrocodone-acetaminophen 5-325 MG Tabs per tablet   Last time this was given:  2 Tabs on 3/19/2017 11:55 AM   Commonly known as:  NORCO   Next Dose Due:  As needed    Take 1-2 Tabs by mouth every 6 hours as needed.   Dose:  1-2 Tab       insulin glargine 100 UNIT/ML Soln   Commonly known as:  LANTUS   Next Dose Due:  Tonight 3/19    Inject 10 Units as instructed 2 times a day.   Dose:  10 Units       insulin lispro 100 UNIT/ML Soln   Commonly known as:  HUMALOG   Next Dose Due:  Tonight 3/19    Inject 0-10 Units as instructed 3 times a day before meals.  Sliding Scale: 151-200 = 2 units 201-250 = 4 units 251-300 = 6 units 301-350 = 8 units 351-400 = 10 units   Dose:  0-10 Units       lactulose 10 GM/15ML Soln   Next Dose Due:  Tonight 3/19    Take 20 g by mouth 3 times a day.   Dose:  20 g       melatonin 3 MG Tabs   Next Dose Due:  Tonight 3/19    Take 3 mg by mouth every bedtime.   Dose:  3 mg       metformin 500 MG Tabs   Commonly known as:  GLUCOPHAGE   Next Dose Due:  Tonight w/ dinner 3/19    Take 1 Tab by mouth 2 times a day, with meals.   Dose:  500 mg       metoprolol 25 MG Tabs   Last time this was given:  25 mg on 3/19/2017  8:18 AM   Commonly known as:  LOPRESSOR   Next Dose Due:  Tonight 3/19    Take 1 Tab by mouth 2 Times a Day.   Dose:  25 mg       ondansetron 4 MG Tbdp   Commonly known as:  ZOFRAN ODT   Next Dose Due:  As needed    Take 4 mg by mouth every 6 hours as needed for Nausea/Vomiting.   Dose:  4 mg       phytonadione 1 MG/0.5ML Soln   Commonly known as:  AQUA-MEPHYTON    5 mg by Intramuscular route Once.   Dose:  5 mg       polyethylene glycol/lytes Pack   Commonly known as:  MIRALAX   Next Dose Due:  Tomorrow 3/20    Take 17 g by mouth every day.   Dose:  17 g       potassium chloride SA 20 MEQ Tbcr   Last time this was given:  20 mEq on 3/19/2017  8:17 AM   Commonly known as:  Kdur   Next Dose Due:  Tomorrow 3/20    Take 1 Tab by mouth every day.   Dose:  20 mEq       tamsulosin 0.4 MG capsule   Last time this was given:  0.4 mg on 3/19/2017  8:16 AM   Commonly known as:  FLOMAX   Next Dose Due:  Tomorrow 3/20    Take 0.4 mg by mouth ONE-HALF HOUR AFTER BREAKFAST.   Dose:  0.4 mg       temazepam 15 MG Caps   Commonly known as:  RESTORIL   Next Dose Due:  As needed    Take 15 mg by mouth at bedtime as needed for Sleep.   Dose:  15 mg       warfarin 7.5 MG Tabs   Last time this was given:  7.5 mg on 3/18/2017  9:27 PM   Commonly known as:  COUMADIN   Next Dose Due:  Today 3/19    Take 1 Tab by mouth every day. Titrate to INR 2-3.   Dose:   7.5 mg               Instructions           Diet / Nutrition:    Follow any diet instructions given to you by your doctor or the dietician, including how much salt (sodium) you are allowed each day.    If you are overweight, talk to your doctor about a weight reduction plan.    Activity:    Remain physically active following your doctor's instructions about exercise and activity.    Rest often.     Any time you become even a little tired or short of breath, SIT DOWN and rest.    Worsening Symptoms:    Report any of the following signs and symptoms to the doctor's office immediately:    *Pain of jaw, arm, or neck  *Chest pain not relieved by medication                               *Dizziness or loss of consciousness  *Difficulty breathing even when at rest   *More tired than usual                                       *Bleeding drainage or swelling of surgical site  *Swelling of feet, ankles, legs or stomach                 *Fever (>100ºF)  *Pink or blood tinged sputum  *Weight gain (3lbs/day or 5lbs /week)           *Shock from internal defibrillator (if applicable)  *Palpitations or irregular heartbeats                *Cool and/or numb extremities    Stroke Awareness    Common Risk Factors for Stroke include:    Age  Atrial Fibrillation  Carotid Artery Stenosis  Diabetes Mellitus  Excessive alcohol consumption  High blood pressure  Overweight   Physical inactivity  Smoking    Warning signs and symptoms of a stroke include:    *Sudden numbness or weakness of the face, arm or leg (especially on one side of the body).  *Sudden confusion, trouble speaking or understanding.  *Sudden trouble seeing in one or both eyes.  *Sudden trouble walking, dizziness, loss of balance or coordination.Sudden severe headache with no known cause.    It is very important to get treatment quickly when a stroke occurs. If you experience any of the above warning signs, call 911 immediately.                   Disclaimer         Quit Smoking  / Tobacco Use:    I understand the use of any tobacco products increases my chance of suffering from future heart disease or stroke and could cause other illnesses which may shorten my life. Quitting the use of tobacco products is the single most important thing I can do to improve my health. For further information on smoking / tobacco cessation call a Toll Free Quit Line at 1-853.799.6195 (*[employer] [place]) or 1-625.770.6029 (American [employer] Association) or you can access the web based program at www.lungusa.org.    [place] Tobacco Users Help Line:  (123) 355-4939       Toll Free: 1-782.936.3520  Quit Tobacco Program [facility] [phone])[phone]    Crisis Hotline:    [place] Crisis Hotline:  1-836-TUVQJPY or 1-305.466.4985    [place] Crisis Hotline:    1-203.846.5830 or 149-662-1306    Discharge Survey:   Thank you for choosing [facility]. We hope we did everything we could to make your hospital stay a pleasant one. You may be receiving a phone survey and we would appreciate your time and participation in answering the questions. Your input is very valuable to us in our efforts to improve our service to our patients and their families.        My signature on this form indicates that:    1. I have reviewed and understand the above information.  2. My questions regarding this information have been answered to my satisfaction.  3. I have formulated a plan with my discharge nurse to obtain my prescribed medications for home.                  Disclaimer         __________________________________                     __________       ________                       Patient Signature                                                 Date                    Time

## 2017-03-18 NOTE — ED PROVIDER NOTES
"ED Provider Note    Scribed for Joe Clark M.D. by Fantasma Walker. 3/18/2017, 12:24 PM.    Primary care provider: Nelia Huber M.D.  Means of arrival: ambulance  History obtained from: patient  History limited by: none    CHIEF COMPLAINT  Chief Complaint   Patient presents with   • Chest Pain     pt bib remsa from [facility] facility c/o mid chest pain describes as pressure/left arm pain since this am while laying down. was given morphine 0.4mg/1 nitro tablet at [facility] and asa 324mg and 1 nitro spray by ems w/minimal relief.       HPI  Morales Olivier is a 61 y.o. male who presents to the Emergency Department for evaluation of chest pain sudden onset this morning at 9:00 AM. The patient reports having his first mitral valve replacement in 2008. However, he had another mitral valve replacement 10 days ago for endocarditis. Per patient, his current chest pain is different from the post-op pain he has been experiencing. He states the pain starts \"around his heart\", and it radiates to his left shoulder and left arm. He describes the pain as moderate, and it has been constant since onset. The patient is feeling associated arm tingling, palpitations, right leg pain. He adds the right leg pain may be attributed to his history of DVT in his right leg from 2009 that occurred after his first mitral valve replacement. He denies shortness of breath, abdominal pain. The patient was given 0.4 mg of morphine, 324 aspirin, and 1 nitro tablet by EMS with minimal relief.      Review of old medical records shows:    Patient had a follow-up appointment with Dr. Yancey (Cardiology), but did not go. Today marks 10 days status post mitral valve repair. His last ER visit was in Nov. 2016 for bronchitis, and he has one previous visit for back pain.   HPI & HOSPITAL COURSE  This is a 61 y.o. year old male here with Severe prosthetic mitral valve endocarditis, was admitted for elective MVR.   DISCHARGE PROBLEM LIST   Severe " prosthetic valve MS,   HX MVR/maze/left atrial tumor resection 2008,   Mod severe TR,   chronic afib,   s/p PPM/AICD,   COPD,   htn,   DM,   hx of DVT on chronic anticoagulation       REVIEW OF SYSTEMS  Review of Systems   Respiratory: Negative for shortness of breath.    Cardiovascular: Positive for chest pain and palpitations.   Gastrointestinal: Negative for abdominal pain.   Musculoskeletal: Positive for joint pain (left shoulder).        Positive right leg pain   Neurological: Positive for tingling (left arm).   All other systems reviewed and are negative.      PAST MEDICAL HISTORY   has a past medical history of Gout; CAD (coronary artery disease); Hypertension; Atrial fibrillation (CMS-[facility]); Pacemaker; Heart valve disease; Renal disorder; Bronchitis; Type II or unspecified type diabetes mellitus without mention of complication, not stated as uncontrolled; and Personal history of venous thrombosis and embolism (2009).    SURGICAL HISTORY   has past surgical history that includes mitral valve replace (3/14/08); maze procedure (3/14/08); angiogram (7/3/2009); angiogram (7/4/2009); cath removal (7/4/2009); thrombectomy (7/4/2009); embolectomy (7/4/2009); irrigation & debridement general (7/20/2009); wide excision (7/20/2009); other cardiac surgery (2008); other abdominal surgery; mitral valve replace (3/8/2017); and lopez (3/8/2017).    SOCIAL HISTORY  Social History   Substance Use Topics   • Smoking status: Former Smoker     Quit date: 01/01/1981   • Smokeless tobacco: Never Used   • Alcohol Use: No      History   Drug Use No       FAMILY HISTORY  No history pertinent to complaint.     CURRENT MEDICATIONS  Home Medications     Reviewed by Shantel Woods R.N. (Registered Nurse) on 03/18/17 at 1216  Med List Status: Partial    Medication Last Dose Status    albuterol 108 (90 BASE) MCG/ACT Aero Soln inhalation aerosol 3/7/2017 Active    amiodarone (PACERONE) 400 MG tablet  Active    aspirin EC 81 MG EC tablet   "Active    furosemide (LASIX) 40 MG Tab  Active    hydrocodone-acetaminophen (NORCO) 5-325 MG Tab per tablet  Active    metformin (GLUCOPHAGE) 500 MG Tab  Active    metoprolol (LOPRESSOR) 25 MG TABS 3/7/2017 Active    potassium chloride SA (KDUR) 20 MEQ Tab CR  Active    tamsulosin (FLOMAX) 0.4 MG capsule 3/7/2017 Active    warfarin (COUMADIN) 7.5 MG Tab  Active                ALLERGIES  No Known Allergies    PHYSICAL EXAM  VITAL SIGNS: /76 mmHg  Pulse 83  Temp(Src) 36.2 °C (97.2 °F)  Resp 20  Ht 1.8 m (5' 10.87\")  Wt 118.842 kg (262 lb)  BMI 36.68 kg/m2  SpO2 97%    Constitutional: Well developed, Well nourished, Minimal distress, Non-toxic appearance.   Eyes: PERRLA, EOMI, Conjunctiva normal, No discharge.   Cardiovascular: Normal heart rate, Normal rhythm, No murmurs, No rubs, No gallops. Surgical scar intact.   Thorax & Lungs: Normal breath sounds, No respiratory distress, No wheezing or rhonchi, No chest tenderness.   Abdomen: Bowel sounds normal, Soft, No tenderness, No masses, No pulsatile masses. No guarding or rebound. No evidence of peritoneal findings.  Skin: Warm, Dry, No erythema, No rash. No exanthem.   Extremities:  No edema, No tenderness, No cyanosis, No clubbing.   Musculoskeletal: Good range of motion in all major joints. No major deformities noted.   Neurologic: Alert & oriented x 3, Normal motor function, No focal deficits noted.   Psychiatric: Affect normal, mood normal.                                                              DIAGNOSTIC STUDIES / PROCEDURES    LABS  Results for orders placed or performed during the hospital encounter of 03/18/17   CBC WITH DIFFERENTIAL   Result Value Ref Range    WBC 10.0 4.8 - 10.8 K/uL    RBC 3.88 (L) 4.70 - 6.10 M/uL    Hemoglobin 12.0 (L) 14.0 - 18.0 g/dL    Hematocrit 36.7 (L) 42.0 - 52.0 %    MCV 94.6 81.4 - 97.8 fL    MCH 30.9 27.0 - 33.0 pg    MCHC 32.7 (L) 33.7 - 35.3 g/dL    RDW 47.8 35.9 - 50.0 fL    Platelet Count 170 164 - 446 K/uL "    MPV 10.0 9.0 - 12.9 fL    Nucleated RBC 0.00 /100 WBC    NRBC (Absolute) 0.00 K/uL    Neutrophils-Polys 87.60 (H) 44.00 - 72.00 %    Lymphocytes 5.30 (L) 22.00 - 41.00 %    Monocytes 3.50 0.00 - 13.40 %    Eosinophils 0.90 0.00 - 6.90 %    Basophils 0.00 0.00 - 1.80 %    Neutrophils (Absolute) 8.76 (H) 1.82 - 7.42 K/uL    Lymphs (Absolute) 0.53 (L) 1.00 - 4.80 K/uL    Monos (Absolute) 0.35 0.00 - 0.85 K/uL    Eos (Absolute) 0.09 0.00 - 0.51 K/uL    Baso (Absolute) 0.00 0.00 - 0.12 K/uL   COMP METABOLIC PANEL   Result Value Ref Range    Sodium 136 135 - 145 mmol/L    Potassium 4.1 3.6 - 5.5 mmol/L    Chloride 102 96 - 112 mmol/L    Co2 29 20 - 33 mmol/L    Anion Gap 5.0 0.0 - 11.9    Glucose 80 65 - 99 mg/dL    Bun 15 8 - 22 mg/dL    Creatinine 1.34 0.50 - 1.40 mg/dL    Calcium 8.0 (L) 8.5 - 10.5 mg/dL    AST(SGOT) 15 12 - 45 U/L    ALT(SGPT) 10 2 - 50 U/L    Alkaline Phosphatase 56 30 - 99 U/L    Total Bilirubin 0.5 0.1 - 1.5 mg/dL    Albumin 3.0 (L) 3.2 - 4.9 g/dL    Total Protein 5.1 (L) 6.0 - 8.2 g/dL    Globulin 2.1 1.9 - 3.5 g/dL    A-G Ratio 1.4 g/dL   TROPONIN   Result Value Ref Range    Troponin I <0.01 0.00 - 0.04 ng/mL   BTYPE NATRIURETIC PEPTIDE   Result Value Ref Range    B Natriuretic Peptide 113 (H) 0 - 100 pg/mL   LIPASE   Result Value Ref Range    Lipase 15 11 - 82 U/L   ESTIMATED GFR   Result Value Ref Range    GFR If African American >60 >60 mL/min/1.73 m 2    GFR If Non African American 54 (A) >60 mL/min/1.73 m 2   DIFFERENTIAL MANUAL   Result Value Ref Range    Metamyelocytes 1.80 %    Myelocytes 0.90 %    Manual Diff Status PERFORMED    PERIPHERAL SMEAR REVIEW   Result Value Ref Range    Peripheral Smear Review see below    PLATELET ESTIMATE   Result Value Ref Range    Plt Estimation Normal    MORPHOLOGY   Result Value Ref Range    RBC Morphology Present     Large Platelets 1+     Toxic Gran Slight    EKG (ER)   Result Value Ref Range    Report       [facility]    Test Date:  2017  Pt Name:    AILYN MCINTOSH                 Department: ER  MRN:        [id number]                      Room:        06  Gender:     M                            Technician: [id number]  :        1955                   Requested By:ER TRIAGE PROTOCOL  Order #:    [id number]                    Reading MD:    Measurements  Intervals                                Axis  Rate:       80                           P:          0  WA:         160                          QRS:        -26  QRSD:       154                          T:          141  QT:         468  QTc:        540    Interpretive Statements  VENTRICULAR-PACED RHYTHM  NO FURTHER ANALYSIS ATTEMPTED DUE TO PACED RHYTHM  Compared to ECG 2017 09:05:53  No significant changes        All labs reviewed by me.    EKG  Interpreted by me    Rhythm: Paced rhythm  Rate: 80 which is normal  Axis: normal  Ectopy: none  Conduction: normal  ST Segments: no acute change  T Waves: no acute change  Q Waves: none    Clinical Impression: paced rhythm with no acute changes and normal EKG    RADIOLOGY  CT-CTA CHEST PULMONARY ARTERY W/ RECONS   Final Result      1.  No evidence of pulmonary embolus.      2.  Postsurgical change consistent with recent median sternotomy with fluid within the sternotomy defect along the inferior aspect of the sternum with separation of the sternotomy defect by approximately 15 mm raising the possibility of sternal    dehiscence. Most inferior sternotomy wire does not appear to extend through the right side of the sternum.      3.  Moderate right and trace left pleural effusions.               ECHOCARDIOGRAM-COMP W/ CONT    (Results Pending)     The radiologist's interpretation of all radiological studies have been reviewed by me.    COURSE & MEDICAL DECISION MAKING  Nursing notes, VS, PMSFHx reviewed in chart.    Review of old medical records shows:    Patient had a follow-up appointment with Dr. Yancey (Cardiology),  but did not go. Today marks 10 days status post mitral valve repair. His last ER visit was in Nov. 2016 for bronchitis, and he has one previous visit for back pain.   HPI & HOSPITAL COURSE  This is a 61 y.o. year old male here with Severe prosthetic mitral valve endocarditis, was admitted for elective MVR.   DISCHARGE PROBLEM LIST   Severe prosthetic valve MS,   HX MVR/maze/left atrial tumor resection 2008,   Mod severe TR,   chronic afib,   s/p PPM/AICD,   COPD,   htn,   DM,   hx of DVT on chronic anticoagulation     12:24 PM - Patient seen and examined at bedside. Ordered EKG, CTA chest CT, CBC with differential, CMP, Troponin, BNP, Lipase, Echocardiogram to evaluate his symptoms. I will also page cardiac surgery for consult. The differential diagnoses include but are not limited to: cardiac vs. Non-cardiac chest pain, pulmonary embolism, pneumonia, flash pneumonia, complication of recent valve surgery. I discussed the treatment plan as above with the patient. He understood and verbalized agreement.     12:34 PM - Paged cardiac surgery    1:05 PM - I discussed the patient's case and the above findings with [place] [facility] who don't think the patient is experiencing problems outside of normal issues with valve repair.    3:05 PM - Paged Thoracic surgery    3:10 PM - I discussed the patient's case and the above findings with Dr. Beard (Thoracic Surgery) who states the patient's pain is normal due to his recent surgery. He advises the patient follow-up with him next week.    3:16 PM - The patient's radiology results were reviewed by Cardiology.    3:17 PM - [facility].    During his observation patient had no significant chest pain dysrhythmia hypoxia or hypotension.    3:32 PM - I discussed the patient's case and the above findings [facility] who agree to admit the patient.     DISPOSITION:  Patient will be admitted to [facility] Internal Medicine in  guarded condition.      FINAL IMPRESSION  1. Acute chest pain    2. H/O mitral valve replacement           IFantasma (Scribe), am scribing for, and in the presence of, Joe Clark M.D..    Electronically signed by: Fantasma Walker (Scribe), 3/18/2017    IJoe M.D. personally performed the services described in this documentation, as scribed by Fantasma Walker in my presence, and it is both accurate and complete.    The note accurately reflects work and decisions made by me.  Joe Clark  3/18/2017  6:23 PM

## 2017-03-18 NOTE — ED NOTES
Med rec complete per pt's MAR from [facility]   Allergies reviewed - NKDA  No ABX noted on MAR   Pt's Warfarin was held for the last two nights

## 2017-03-19 VITALS
BODY MASS INDEX: 36.42 KG/M2 | HEIGHT: 71 IN | WEIGHT: 260.14 LBS | HEART RATE: 80 BPM | DIASTOLIC BLOOD PRESSURE: 75 MMHG | SYSTOLIC BLOOD PRESSURE: 115 MMHG | TEMPERATURE: 97.6 F | OXYGEN SATURATION: 95 % | RESPIRATION RATE: 20 BRPM

## 2017-03-19 LAB
GLUCOSE BLD-MCNC: 109 MG/DL (ref 65–99)
GLUCOSE BLD-MCNC: 110 MG/DL (ref 65–99)
INR PPP: 1.3 (ref 0.87–1.13)
PROTHROMBIN TIME: 16.6 SEC (ref 12–14.6)

## 2017-03-19 PROCEDURE — A9270 NON-COVERED ITEM OR SERVICE: HCPCS | Performed by: NURSE PRACTITIONER

## 2017-03-19 PROCEDURE — 700102 HCHG RX REV CODE 250 W/ 637 OVERRIDE(OP): Performed by: HOSPITALIST

## 2017-03-19 PROCEDURE — 36415 COLL VENOUS BLD VENIPUNCTURE: CPT

## 2017-03-19 PROCEDURE — 700102 HCHG RX REV CODE 250 W/ 637 OVERRIDE(OP): Performed by: NURSE PRACTITIONER

## 2017-03-19 PROCEDURE — 700111 HCHG RX REV CODE 636 W/ 250 OVERRIDE (IP): Performed by: HOSPITALIST

## 2017-03-19 PROCEDURE — G0378 HOSPITAL OBSERVATION PER HR: HCPCS

## 2017-03-19 PROCEDURE — 700111 HCHG RX REV CODE 636 W/ 250 OVERRIDE (IP): Performed by: NURSE PRACTITIONER

## 2017-03-19 PROCEDURE — A9270 NON-COVERED ITEM OR SERVICE: HCPCS | Performed by: HOSPITALIST

## 2017-03-19 PROCEDURE — 82962 GLUCOSE BLOOD TEST: CPT

## 2017-03-19 PROCEDURE — 85610 PROTHROMBIN TIME: CPT

## 2017-03-19 PROCEDURE — 96376 TX/PRO/DX INJ SAME DRUG ADON: CPT

## 2017-03-19 PROCEDURE — 96375 TX/PRO/DX INJ NEW DRUG ADDON: CPT

## 2017-03-19 RX ORDER — HYDROCODONE BITARTRATE AND ACETAMINOPHEN 5; 325 MG/1; MG/1
1-2 TABLET ORAL EVERY 6 HOURS PRN
Status: DISCONTINUED | OUTPATIENT
Start: 2017-03-19 | End: 2017-03-19 | Stop reason: HOSPADM

## 2017-03-19 RX ORDER — FUROSEMIDE 40 MG/1
40 TABLET ORAL
Status: DISCONTINUED | OUTPATIENT
Start: 2017-03-19 | End: 2017-03-19 | Stop reason: HOSPADM

## 2017-03-19 RX ADMIN — MORPHINE SULFATE 2 MG: 4 INJECTION INTRAVENOUS at 02:45

## 2017-03-19 RX ADMIN — MORPHINE SULFATE 2 MG: 4 INJECTION INTRAVENOUS at 08:17

## 2017-03-19 RX ADMIN — TAMSULOSIN HYDROCHLORIDE 0.4 MG: 0.4 CAPSULE ORAL at 08:16

## 2017-03-19 RX ADMIN — FUROSEMIDE 40 MG: 40 TABLET ORAL at 05:27

## 2017-03-19 RX ADMIN — AMIODARONE HYDROCHLORIDE 400 MG: 200 TABLET ORAL at 08:16

## 2017-03-19 RX ADMIN — POTASSIUM CHLORIDE 20 MEQ: 1500 TABLET, EXTENDED RELEASE ORAL at 08:17

## 2017-03-19 RX ADMIN — HYDROMORPHONE HYDROCHLORIDE 0.5 MG: 1 INJECTION, SOLUTION INTRAMUSCULAR; INTRAVENOUS; SUBCUTANEOUS at 00:26

## 2017-03-19 RX ADMIN — HYDROCODONE BITARTRATE AND ACETAMINOPHEN 2 TABLET: 5; 325 TABLET ORAL at 11:55

## 2017-03-19 RX ADMIN — ONDANSETRON 4 MG: 2 INJECTION, SOLUTION INTRAMUSCULAR; INTRAVENOUS at 08:17

## 2017-03-19 RX ADMIN — ASPIRIN 81 MG: 81 TABLET ORAL at 08:16

## 2017-03-19 RX ADMIN — METOPROLOL TARTRATE 25 MG: 25 TABLET, FILM COATED ORAL at 08:18

## 2017-03-19 RX ADMIN — HYDROCODONE BITARTRATE AND ACETAMINOPHEN 2 TABLET: 5; 325 TABLET ORAL at 04:19

## 2017-03-19 ASSESSMENT — PAIN SCALES - GENERAL
PAINLEVEL_OUTOF10: 9
PAINLEVEL_OUTOF10: 7
PAINLEVEL_OUTOF10: 7
PAINLEVEL_OUTOF10: 8
PAINLEVEL_OUTOF10: 9
PAINLEVEL_OUTOF10: 7

## 2017-03-19 ASSESSMENT — LIFESTYLE VARIABLES: EVER_SMOKED: YES

## 2017-03-19 NOTE — PROGRESS NOTES
2 RN skin assessment completed.   Bruising to Left and right forearms.   Right second digit toenail cracked/broken   Right chest redness; patient states broken blood Vessels from coughing.   Chest incision with scant yellow drainage. approximated edges.   Finger nail scratches throughout all 4 extremities, hips, and back.   Jameel and dry calves.   Sacral region blanchable redness.   bilat blanching ears, silicone tubing in place.

## 2017-03-19 NOTE — PROGRESS NOTES
[facility] MEDICINE PROGRESS NOTE     Attending: YANNI Kim MD    Resident: Alexander    PATIENT: Morales Olivier; [id number]; 1955    ID: 61 y.o. male admitted for chest pain, rule out ACS, although likely musculoskeletal in nature with recent mitral valve replacement.    SUBJECTIVE: No acute events overnight, doing well this am, still has some pain in center of chest, radiation to back, improves with narcotic pain medication    OBJECTIVE:     Filed Vitals:    03/18/17 2015 03/19/17 0005 03/19/17 0406 03/19/17 0735   BP: 109/81 126/89 136/90 122/88   Pulse: 79 81 78 80   Temp: 36.9 °C (98.4 °F) 36.3 °C (97.4 °F) 36.1 °C (97 °F) 36.2 °C (97.2 °F)   Resp: 20 20 20 16   Height:       Weight: 118 kg (260 lb 2.3 oz)      SpO2: 96% 98% 97% 100%       Intake/Output Summary (Last 24 hours) at 03/19/17 1042  Last data filed at 03/19/17 1000   Gross per 24 hour   Intake    840 ml   Output   1925 ml   Net  -1085 ml       PE:  General: No acute distress, resting comfortably in bed.  HEENT: NC/AT. LIZANDRO. EOMI. MMM  Cardiovascular: Chest with well healing, midline incision from recent mitral valve replacement, no purulent discharge or erythema, pain to palpationdistant heart sounds, RRR, 2/6 systolic murmur  Respiratory: Symmetrical chest. CTAB with no W/R, rales in b/l bases  Abdomen: soft, NT/ND, no masses, +BS   EXT:  BIANCHI, No C/C/E 2+ pulses   Neuro: non focal with no numbness, tingling or changes in sensation    LABS:  Recent Labs      03/18/17   1227   WBC  10.0   RBC  3.88*   HEMOGLOBIN  12.0*   HEMATOCRIT  36.7*   MCV  94.6   MCH  30.9   RDW  47.8   PLATELETCT  170   MPV  10.0   NEUTSPOLYS  87.60*   LYMPHOCYTES  5.30*   MONOCYTES  3.50   EOSINOPHILS  0.90   BASOPHILS  0.00   RBCMORPHOLO  Present     Recent Labs      03/18/17   1227   SODIUM  136   POTASSIUM  4.1   CHLORIDE  102   CO2  29   BUN  15   CREATININE  1.34   CALCIUM  8.0*   ALBUMIN  3.0*     Estimated GFR/CRCL = Estimated Creatinine Clearance: 74.5 mL/min (by  C-G formula based on Cr of 1.34).  Recent Labs      03/18/17   1227  03/18/17 2032  03/19/17   0523   GLUCOSE  80   --    --    POCGLUCOSE   --   130*  109*     Recent Labs      03/18/17   1227  03/19/17   0355   ASTSGOT  15   --    ALTSGPT  10   --    TBILIRUBIN  0.5   --    ALKPHOSPHAT  56   --    GLOBULIN  2.1   --    INR  1.64*  1.30*     Recent Labs      03/18/17   1227  03/18/17   1800  03/18/17   2148   TROPONINI  <0.01  0.01  <0.01   BNPBTYPENAT  113*   --    --            Invalid input(s): [id number]  Recent Labs      03/18/17   1227  03/19/17   0355   INR  1.64*  1.30*       MICROBIOLOGY:   Results     ** No results found for the last 168 hours. **            IMAGING:   ECHOCARDIOGRAM-COMP W/ CONT   Final Result      CT-CTA CHEST PULMONARY ARTERY W/ RECONS   Final Result      1.  No evidence of pulmonary embolus.      2.  Postsurgical change consistent with recent median sternotomy with fluid within the sternotomy defect along the inferior aspect of the sternum with separation of the sternotomy defect by approximately 15 mm raising the possibility of sternal    dehiscence. Most inferior sternotomy wire does not appear to extend through the right side of the sternum.      3.  Moderate right and trace left pleural effusions.                     MEDS:  Current Facility-Administered Medications   Medication Last Dose   • furosemide (LASIX) tablet 40 mg 40 mg at 03/19/17 0527   • hydrocodone-acetaminophen (NORCO) 5-325 MG per tablet 1-2 Tab 2 Tab at 03/19/17 0419   • amiodarone (CORDARONE) tablet 400 mg 400 mg at 03/19/17 0816   • aspirin EC (ECOTRIN) tablet 81 mg 81 mg at 03/19/17 0816   • [START ON 3/20/2017] metformin (GLUCOPHAGE) tablet 500 mg     • metoprolol (LOPRESSOR) tablet 25 mg 25 mg at 03/19/17 0818   • potassium chloride SA (Kdur) tablet 20 mEq 20 mEq at 03/19/17 0817   • tamsulosin (FLOMAX) capsule 0.4 mg 0.4 mg at 03/19/17 0816   • MD ALERT... warfarin (COUMADIN) per pharmacy protocol     •  ondansetron (ZOFRAN) syringe/vial injection 4 mg 4 mg at 03/19/17 0817   • ondansetron (ZOFRAN ODT) dispertab 4 mg     • promethazine (PHENERGAN) tablet 12.5-25 mg     • promethazine (PHENERGAN) suppository 12.5-25 mg     • prochlorperazine (COMPAZINE) injection 5-10 mg     • insulin lispro (HUMALOG) injection 1-6 Units Stopped at 03/18/17 2100   • nitroglycerin (NITROSTAT) tablet 0.4 mg     • morphine (pf) 4 mg/ml injection 1-2 mg 2 mg at 03/19/17 0817   • warfarin (COUMADIN) tablet 7.5 mg 7.5 mg at 03/18/17 2127   • [START ON 3/20/2017] warfarin (COUMADIN) tablet 5 mg         PROBLEM LIST:  Problem Noted   Copd Exacerbation ([facility]) 11/22/2016       ASSESSMENT/PLAN:   62 male with PMHx of recent redo mitral valve replacement on 3/8/17, severe prosthetic mitral stenosis,    diabetes, hypertension, COPD, systolic and diastolic heart failure, DVT, admitted for complaints of chest pain, like postsurgical/musculoskeletal     #Chest pain, musculoskeletal/postsurgical   -admitted to obs; telemetry    -cardiac catheterization on 12/14/16 revealing patent coronary arteries.    -CT with; postsurgical change consistent with recent median sternotomy with fluid within the sternotomy defect along the inferior aspect of the sternum with separation of the sternotomy defect by approximately 15 mm raising the possibility of sternal   Dehiscence possible   - Dr. Clark did discuss  with Dr. Beard, cardiothoracic surgery on-call, recommended admission for    observation.    -troponin neg x3  ECHO improved from previous following mitral valve re-replacement    Plan:   -continue obs  -continue norco and morphine for pain control    #Systolic and Diasytolic CHF  -Bilateral pleural effusions without hypoxemia.  He has no acute shortness    Breath  -s/p AICD with pacer  -ECHO: 3/18 Left ventricular ejection fraction is visually estimated to be 60%, improved from previous of 35%. With apical inferior septal hypokinesis.     #H/O  Diabetes mellitus, now prediabetes  -previously on metformin, now off  -accuchecks     # h/o atrial fibrillation.    -continue home amiodarone, Coumadin  -INR subtherapeutic at 1.3, target 2.5-3.5  -s/p maze procedure 2008     #Anemia, suspect MAHA given mitral valve history and resent surgery  -stable without symptoms of bleed.    #h/o DVT  -on warfarin  -s/p thrombectomy 2009    Hypertension,   -currently normotensive, resume outpatient  medications,   -metoprolol 25 mg BID    BPH  -continue home Flomax.       Prophy: on warfarin  PCP: Dr. Huber  Dispo: Continued observation and pain control, anticipate d/c home later today vs. tomorrow  CODE STATUS: Full

## 2017-03-19 NOTE — H&P
CHIEF COMPLAINT:  Chest pain.    PRIMARY CARE PHYSICIAN:  Nelia Huber MD    HISTORY OF PRESENT ILLNESS:  Patient is a 61-year-old male with history of   redo mitral valve replacement on 3/8/17, severe prosthetic mitral stenosis,   diabetes, hypertension, CHF, COPD, systolic and diastolic heart failure, DVT,   evaluated here at [facility] with complaints of chest pain.  Patient reports   left-sided, onset this morning.  Patient is a poor historian, reports duration   approximately 2-3 hours.  He had fluttering and chest palpitations.  He   reports worsened with movement, also pain in the left arm, shoulder blade   area, now symptoms have resolved.  He has had some nausea without vomiting,   leg swelling.  No shortness of breath.  No fevers or chills.  No localized   weakness.  No abdominal pain, melena, hematochezia, calf pain.    REVIEW OF SYSTEMS:  As above, otherwise negative according to AMA and CMS   criteria.    PAST MEDICAL HISTORY:  Includes:  1.  Recent redo mitral valve replacement on 3/8/17 by Dr. Wright.  2.  History of severe prosthetic mitral stenosis.  3.  COPD, hypertension, diabetes, CHF, DVT, atrial fibrillation and DVT of   right lower extremity.  4.  CHF with systolic and diastolic dysfunction with severe, moderate   tricuspid regurgitation.    PAST SURGICAL HISTORY:  As above, also includes pacemaker/AICD, cardiac   catheterization on 12/14/16 revealing prosthetic mitral stenosis 0.8 cm2,   moderate pulmonary hypertension, EF of 47%, hernia repair in 2002.    History of mitral valve replacement 10 years ago due to rheumatoid heart   disease with mitral regurg, maze procedure in 2008, thrombectomy and   embolectomy in 7/2009 for right lower extremity DVT.    MEDICATIONS:  Include albuterol 2 puffs q.6 p.r.n., amiodarone 400 mg daily,   enteric-coated aspirin 81 daily, Tessalon 100 t.i.d. p.r.n., Dulcolax 10 mg,   Lasix 40 daily, guaifenesin 10 mL q.6 p.r.n., Norco 5/325 one to two q.6 hours    p.r.n., Lantus 10 units twice a day, insulin sliding scale, lactulose 20   t.i.d., melatonin 3 mg daily, metformin 500 b.i.d., Lopressor 25 b.i.d.,   Zofran, MiraLax, vitamin K, Flomax 0.4 mg daily, Restoril 15 mg for sleep, and   warfarin 7.5 mg daily.    ALLERGIES:  No known drug allergies.    SOCIAL HISTORY:  Lives independently.  No tobacco or alcohol.    FAMILY HISTORY:  No reported heart disease, diabetes, hypertension.    PHYSICAL EXAMINATION:  CURRENT VITAL SIGNS:  Temperature 36.2, pulse 80s, respirations 18, blood   pressure 109/76, 96% on 2 liters, 116 kilograms.  GENERAL:  Patient was severely obese, alert, appropriate, oriented.  No   apparent distress.  HEENT:  Anicteric.  Extraocular movements are intact.  Mucous membranes were   moist.  NECK:  No cervical or supraclavicular adenopathy.  Trachea midline.  No JVD.  CARDIOVASCULAR:  Healed sternotomy scar with overlying scab.  No significant   warmth.  There is minimal erythema.  Chest wall tenderness to palpation,   parasternal region.  LUNGS:  Diminished breath sounds at the bases.  No rales or wheeze.  ABDOMEN:  Bowel sounds present, soft, nontender, nondistended.  No   hepatosplenomegaly.  BACK:  No CVA or paraspinal tenderness.  EXTREMITIES:  Generalized 5/5 strength, 1+ lower extremity edema.  Negative   Homans sign.  2+ symmetric radial pulses.  NEUROLOGIC:  Cranial nerves were grossly intact.  No focal extremity weakness.  SKIN:  Warm and dry without pallor.  PSYCHIATRIC:  Calm and cooperative without depressed affect.    LABORATORY DATA:  White count 10, hemoglobin 12.0, platelet count 170,000.    BUN and creatinine 15 and 1.3.  Albumin 3.0.  Troponin less than 0.01.  BNP   113.  The patient's INR 1.64.    A CT of the chest revealed no PE, postsurgical changes consistent with recent   median sternotomy with fluid within the sternotomy defect _____ with   separation of sternotomy defect by approximately 15 mm, moderate right and   trace left  pleural effusion.    Patient's echocardiogram revealed normal LVEF 60%, apical inferior septal   hypokinesis, the mitral valve bioprosthesis functioning normally with   appropriate gradient, RV systolic pressure 25 mmHg.    Patient's EKG, my interpretation revealed ventricle paced rhythm, rate 80.    IMPRESSION AND PLAN:  1.  Chest pain.  Patient will be admitted observation status to telemetry   floor.  He has had a cardiac catheterization on 12/14/16 revealing patent   coronary arteries.  Possible musculoskeletal inflammatory process.  He is   afebrile, normal white count and likely infectious.  A CT scan did reveal   fluid and possibly sternotomy separation.  ER doctor, Dr. Clark did discuss   with Dr. Beard, cardiothoracic surgery on-call, recommended admission for   observation.  We will provide analgesics for pain.  He has reproducible chest   wall tenderness on exam to suggest a musculoskeletal etiology.  2.  Bilateral pleural effusions without hypoxemia.  He has no acute shortness   breath, will follow clinically.  Increase Lasix.  3.  Diabetes mellitus, fair control.  We will monitor serial Accu-Cheks,   provide insulin sliding scale coverage.  Continue with metformin.  4.  History of atrial fibrillation.  Resume amiodarone, Coumadin, follow up   coagulation studies.  5.  Anemia, stable without symptoms of bleed.  6.  History of hypertension, currently normotensive, resume outpatient   medications, on beta-blocker.  7.  History of benign prostatic hypertrophy, resume Flomax.       ____________________________________     MD ARTHUR MOHAMUD / ANIBAL    DD:  03/19/2017 01:13:31  DT:  03/19/2017 06:19:09    D#:  [id number]  Job#:  [id number]

## 2017-03-19 NOTE — CARE PLAN
Problem: Safety  Goal: Will remain free from injury  Outcome: PROGRESSING AS EXPECTED  Bed is locked and in lowest position with treaded socks on and call light within reach. Patient educated regarding safety. Verbalizes understanding and calls appropriately for assistance. Strip alarm on. Pt using heart pillow for splinting and with transfer    Problem: Knowledge Deficit  Goal: Knowledge of disease process/condition, treatment plan, diagnostic tests, and medications will improve  Outcome: PROGRESSING AS EXPECTED  Pt educated on POC, current medications and doses and disease process. All questions answered.

## 2017-03-19 NOTE — PROGRESS NOTES
Inpatient Anticoagulation Service Note    Date: 3/19/2017  Reason for Anticoagulation: Bioprosthetic Valve Replacement, Atrial Fibrillation, Deep Vein Thrombosis  Hemoglobin Value: 12  Hematocrit Value: 36.7  Lab Platelet Value: 170  Target INR: 2.0 to 3.0  INR from last 7 days     Date/Time INR Value    03/19/17 0355 (!)1.3    03/18/17 1227 (!)1.64        Dose from last 7 days     Date/Time Dose (mg)    03/19/17 1400 7.5    03/18/17 1900 7.5        Average Dose (mg):  (Warfarin 7.5 mg PO daily per [facility] MAR)  Significant Interactions: Amiodarone, Aspirin  Bridge Therapy: No - could consider therapy    Reversal Agent Administered: Vitamin K  Subcutaneous (Vit K 5 mg SQ at [facility] 3/17)    Comments:  Patient on chronic warfarin for history of AFib, DVT and recent mitral valve replacement. Patient transferred from [facility], was getting warfarin 7.5 mg PO daily, last dose administered 3/15. Warfarin was held 3/16 (INR 5.2) and 3/17, Vit K 5 mg SQ also administered 3/17 for INR 6.3.   Patient's warfarin drug interactions stable since PTA, tolerating oral diet.   No new CBC, no documented s/s of bleeding noted.     INR down trending, sub therapeutic likely due to recent vitamin K administration.  Will give warfarin 7.5 mg again tonight. Repeat daily coags to guide further dosing. Anticipate INR will remain subtherapeutic for several days due to recent Vit K administration.     Plan:  Warfarin 7.5 mg PO tonight. INR in AM.  Education Material Provided?: No (Chronic warfarin patient)  Pharmacist suggested discharge dosing: TBD pending INR trends, possibly between warfarin 5-7.5 mg PO daily with follow-up INR within 48 hours of discharge     Harish Robins, PharmD, BCPS

## 2017-03-19 NOTE — DISCHARGE PLANNING
Received transportation form from [facility] at 1450. Transportation arranged with Linda at [facility] for pickup at 1700.

## 2017-03-19 NOTE — PROGRESS NOTES
Inpatient Anticoagulation Service Note    Date: 3/18/2017  Reason for Anticoagulation: Bioprosthetic Valve Replacement, Atrial Fibrillation, Deep Vein Thrombosis   [id number] VASc Score: 4    Hemoglobin Value: 12  Hematocrit Value: 36.7  Lab Platelet Value: 170  Target INR: 2.0 to 3.0    INR from last 7 days     Date/Time INR Value    03/18/17 1227 (!)1.64        Dose from last 7 days     Date/Time Dose (mg)    03/18/17 1900 7.5        Average Dose (mg):  (Warfarin 7.5 mg PO daily per [facility] MAR)  Significant Interactions: Amiodarone, Aspirin  Bridge Therapy: No    Reversal Agent Administered: Vitamin K  Subcutaneous (Vit K 5 mg SQ at [facility] 3/17)    Comments: Patient on chronic warfarin for history of AFib, DVT and recent mitral valve replacement. Patient transferred from [facility], was getting warfarin 7.5 mg PO daily, last dose administered 3/15. Warfarin was held 3/16 (INR 5.2) and 3/17, Vit K 5 mg SQ also administered 3/17 for INR 6.3. Patient's warfarin drug interactions stable since PTA, tolerating oral diet. H&H anemic but stable compared to prior CBC results, no documented s/s of bleeding noted. Will continue warfarin this evening and repeat daily coags to guide further dosing. Anticipate INR will remain subtherapeutic for several days due to recent Vit K administration.     Plan:  Warfarin 7.5 mg PO today. INR in AM.    Education Material Provided?: No (Chronic warfarin patient)    Pharmacist suggested discharge dosing: TBD pending INR trends, possibly between warfarin 5-7.5 mg PO daily with follow-up INR within 48 hours of discharge     Pharmacy will continue to follow.     Sneha Mayorga, PharmD

## 2017-03-19 NOTE — CARE PLAN
Problem: Safety  Goal: Will remain free from falls  Outcome: PROGRESSING AS EXPECTED  Patient educated on fall risk and use of bed alarm. Bed alarms in place. Patient educated to call before getting out of bed, verbalizes understanding. Call light in reach and safety measures in place.     Problem: Pain Management  Goal: Pain level will decrease to patient’s comfort goal  Outcome: PROGRESSING AS EXPECTED  Patient educated on pain scale. Morphine administered and pain reassessed. Persistent pain 7-9/10.Hospitalist contacted for 0.5 Dilaudid for pain. Patient now resting in bed.

## 2017-03-19 NOTE — DISCHARGE PLANNING
Medical Social Work  PC from [facility] have been informed patient is m/c and can come back to facility today.   Attending doctor is writing up a d/c summary, [facility] can  the patient at 5:00    Faxed transport communication form to [facility].

## 2017-03-19 NOTE — ED NOTES
Attempted to call report to tele RN but not available at this time, states that will call this RN back in 10 minutes.

## 2017-03-19 NOTE — PROGRESS NOTES
Assumed care of patient at 0715, received bedside report from night shift RN. Bed is locked and in lowest position with call light within reach. Treaded socks in place. Patient updated on plan of care. Pt c/o 7/10 chest and back pain. Medicated per MAR. White board updated. Assessment completed. Pt A&Ox4. Tele monitor in place and cardiac rhythm being monitored. All needs met at this time.

## 2017-03-19 NOTE — DISCHARGE INSTRUCTIONS
Discharge Instructions    Discharged to other by medical transportation with escort. Discharged via wheelchair, hospital escort: Yes.  Special equipment needed: Oxygen    Be sure to schedule a follow-up appointment with your primary care doctor or any specialists as instructed.     Discharge Plan: Follow up with Dr. Huber in 1 week following discharge from care facility   Continue home medications as previously prescribed   Return to the ER or contact the primary care physician immediately with any concerns or symptoms including but not limited to  fever, chills, loss of appetite, weight loss, chest pain (outside of expected post-surgical pain), shortness of breath, fatigue, nausea, vomiting, diarrhea, bleeding, the development of any new wounds or lesions or any developing redness, swelling or warmth in any existing wounds or lesions  Diet Plan: Discussed  Activity Level: Discussed  Confirmed Follow up Appointment: Patient to Call and Schedule Appointment  Confirmed Symptoms Management: Discussed  Medication Reconciliation Updated: Yes  Influenza Vaccine Indication: Patient Refuses    I understand that a diet low in cholesterol, fat, and sodium is recommended for good health. Unless I have been given specific instructions below for another diet, I accept this instruction as my diet prescription.   Other diet: Heart healthy diet    Special Instructions: None    · Is patient discharged on Warfarin / Coumadin?   Yes    You are receiving the drug warfarin. Please understand the importance of monitoring warfarin with scheduled PT/INR blood draws.  Follow-up with a call to your personal Doctor's office in 3 days to schedule a PT/INR. .    IMPORTANT: HOW TO USE THIS INFORMATION:  This is a summary and does NOT have all possible information about this product. This information does not assure that this product is safe, effective, or appropriate for you. This information is not individual medical advice and does not  "substitute for the advice of your health care professional. Always ask your health care professional for complete information about this product and your specific health needs.      WARFARIN - ORAL (WARF-uh-rin)      COMMON BRAND NAME(S): Coumadin      WARNING:  Warfarin can cause very serious (possibly fatal) bleeding. This is more likely to occur when you first start taking this medication or if you take too much warfarin. To decrease your risk for bleeding, your doctor or other health care provider will monitor you closely and check your lab results (INR test) to make sure you are not taking too much warfarin. Keep all medical and laboratory appointments. Tell your doctor right away if you notice any signs of serious bleeding. See also Side Effects section.      USES:  This medication is used to treat blood clots (such as in deep vein thrombosis-DVT or pulmonary embolus-PE) and/or to prevent new clots from forming in your body. Preventing harmful blood clots helps to reduce the risk of a stroke or heart attack. Conditions that increase your risk of developing blood clots include a certain type of irregular heart rhythm (atrial fibrillation), heart valve replacement, recent heart attack, and certain surgeries (such as hip/knee replacement). Warfarin is commonly called a \"blood thinner,\" but the more correct term is \"anticoagulant.\" It helps to keep blood flowing smoothly in your body by decreasing the amount of certain substances (clotting proteins) in your blood.      HOW TO USE:  Read the Medication Guide provided by your pharmacist before you start taking warfarin and each time you get a refill. If you have any questions, ask your doctor or pharmacist. Take this medication by mouth with or without food as directed by your doctor or other health care professional, usually once a day. It is very important to take it exactly as directed. Do not increase the dose, take it more frequently, or stop using it unless " directed by your doctor. Dosage is based on your medical condition, laboratory tests (such as INR), and response to treatment. Your doctor or other health care provider will monitor you closely while you are taking this medication to determine the right dose for you. Use this medication regularly to get the most benefit from it. To help you remember, take it at the same time each day. It is important to eat a balanced, consistent diet while taking warfarin. Some foods can affect how warfarin works in your body and may affect your treatment and dose. Avoid sudden large increases or decreases in your intake of foods high in vitamin K (such as broccoli, cauliflower, cabbage, brussels sprouts, kale, spinach, and other green leafy vegetables, liver, green tea, certain vitamin supplements). If you are trying to lose weight, check with your doctor before you try to go on a diet. Cranberry products may also affect how your warfarin works. Limit the amount of cranberry juice (16 ounces/480 milliliters a day) or other cranberry products you may drink or eat.      SIDE EFFECTS:  Nausea, loss of appetite, or stomach/abdominal pain may occur. If any of these effects persist or worsen, tell your doctor or pharmacist promptly. Remember that your doctor has prescribed this medication because he or she has judged that the benefit to you is greater than the risk of side effects. Many people using this medication do not have serious side effects. This medication can cause serious bleeding if it affects your blood clotting proteins too much (shown by unusually high INR lab results). Even if your doctor stops your medication, this risk of bleeding can continue for up to a week. Tell your doctor right away if you have any signs of serious bleeding, including: unusual pain/swelling/discomfort, unusual/easy bruising, prolonged bleeding from cuts or gums, persistent/frequent nosebleeds, unusually heavy/prolonged menstrual flow, pink/dark  urine, coughing up blood, vomit that is bloody or looks like coffee grounds, severe headache, dizziness/fainting, unusual or persistent tiredness/weakness, bloody/black/tarry stools, chest pain, shortness of breath, difficulty swallowing. Tell your doctor right away if any of these unlikely but serious side effects occur: persistent nausea/vomiting, severe stomach/abdominal pain, yellowing eyes/skin. This drug rarely has caused very serious (possibly fatal) problems if its effects lead to small blood clots (usually at the beginning of treatment). This can lead to severe skin/tissue damage that may require surgery or amputation if left untreated. Patients with certain blood conditions (protein C or S deficiency) may be at greater risk. Get medical help right away if any of these rare but serious side effects occur: painful/red/purplish patches on the skin (such as on the toe, breast, abdomen), change in the amount of urine, vision changes, confusion, slurred speech, weakness on one side of the body. A very serious allergic reaction to this drug is rare. However, get medical help right away if you notice any symptoms of a serious allergic reaction, including: rash, itching/swelling (especially of the face/tongue/throat), severe dizziness, trouble breathing. This is not a complete list of possible side effects. If you notice other effects not listed above, contact your doctor or pharmacist. In the [place] - Call your doctor for medical advice about side effects. You may report side effects to FDA at 3-229-AFZ-9396. In [place] - Call your doctor for medical advice about side effects. You may report side effects to [facility] [place] at 1-905.909.7964.      PRECAUTIONS:  Before taking warfarin, tell your doctor or pharmacist if you are allergic to it; or if you have any other allergies. This product may contain inactive ingredients, which can cause allergic reactions or other problems. Talk to your pharmacist for more details.  Before using this medication, tell your doctor or pharmacist your medical history, especially of: blood disorders (such as anemia, hemophilia), bleeding problems (such as bleeding of the stomach/intestines, bleeding in the brain), blood vessel disorders (such as aneurysms), recent major injury/surgery, liver disease, alcohol use, mental/mood disorders (including memory problems), frequent falls/injuries. It is important that all your doctors and dentists know that you take warfarin. Before having surgery or any medical/dental procedures, tell your doctor or dentist that you are taking this medication and about all the products you use (including prescription drugs, nonprescription drugs, and herbal products). Avoid getting injections into the muscles. If you must have an injection into a muscle (for example, a flu shot), it should be given in the arm. This way, it will be easier to check for bleeding and/or apply pressure bandages. This medication may cause stomach bleeding. Daily use of alcohol while using this medicine will increase your risk for stomach bleeding and may also affect how this medication works. Limit or avoid alcoholic beverages. If you have not been eating well, if you have an illness or infection that causes fever, vomiting, or diarrhea for more than 2 days, or if you start using any antibiotic medications, contact your doctor or pharmacist immediately because these conditions can affect how warfarin works. This medication can cause heavy bleeding. To lower the chance of getting cut, bruised, or injured, use great caution with sharp objects like safety razors and nail cutters. Use an electric razor when shaving and a soft toothbrush when brushing your teeth. Avoid activities such as contact sports. If you fall or injure yourself, especially if you hit your head, call your doctor immediately. Your doctor may need to check you. The Food & Drug Administration has stated that generic warfarin products  "are interchangeable. However, consult your doctor or pharmacist before switching warfarin products. Be careful not to take more than one medication that contains warfarin unless specifically directed by the doctor or health care provider who is monitoring your warfarin treatment. Older adults may be at greater risk for bleeding while using this drug. This medication is not recommended for use during pregnancy because of serious (possibly fatal) harm to an unborn baby. Discuss the use of reliable forms of birth control with your doctor. If you become pregnant or think you may be pregnant, tell your doctor immediately. If you are planning pregnancy, discuss a plan for managing your condition with your doctor before you become pregnant. Your doctor may switch the type of medication you use during pregnancy. Very small amounts of this medication may pass into breast milk but is unlikely to harm a nursing infant. Consult your doctor before breast-feeding.      DRUG INTERACTIONS:  Drug interactions may change how your medications work or increase your risk for serious side effects. This document does not contain all possible drug interactions. Keep a list of all the products you use (including prescription/nonprescription drugs and herbal products) and share it with your doctor and pharmacist. Do not start, stop, or change the dosage of any medicines without your doctor's approval. Warfarin interacts with many prescription, nonprescription, vitamin, and herbal products. This includes medications that are applied to the skin or inside the vagina or rectum. The interactions with warfarin usually result in an increase or decrease in the \"blood-thinning\" (anticoagulant) effect. Your doctor or other health care professional should closely monitor you to prevent serious bleeding or clotting problems. While taking warfarin, it is very important to tell your doctor or pharmacist of any changes in medications, vitamins, or herbal " products that you are taking. Some products that may interact with this drug include: capecitabine, imatinib, mifepristone. Aspirin, aspirin-like drugs (salicylates), and nonsteroidal anti-inflammatory drugs (NSAIDs such as ibuprofen, naproxen, celecoxib) may have effects similar to warfarin. These drugs may increase the risk of bleeding problems if taken during treatment with warfarin. Carefully check all prescription/nonprescription product labels (including drugs applied to the skin such as pain-relieving creams) since the products may contain NSAIDs or salicylates. Talk to your doctor about using a different medication (such as acetaminophen) to treat pain/fever. Low-dose aspirin and related drugs (such as clopidogrel, ticlopidine) should be continued if prescribed by your doctor for specific medical reasons such as heart attack or stroke prevention. Consult your doctor or pharmacist for more details. Many herbal products interact with warfarin. Tell your doctor before taking any herbal products, especially bromelains, coenzyme Q10, cranberry, danshen, dong quai, fenugreek, garlic, ginkgo biloba, ginseng, and Thong's wort, among others. This medication may interfere with a certain laboratory test to measure theophylline levels, possibly causing false test results. Make sure laboratory personnel and all your doctors know you use this drug.      OVERDOSE:  If overdose is suspected, contact a poison control center or emergency room immediately. [place] residents can call the [place] National Poison Hotline at 1-351.977.7518. [place] residents can call a provincial poison control center. Symptoms of overdose may include: bloody/black/tarry stools, pink/dark urine, unusual/prolonged bleeding.      NOTES:  Do not share this medication with others. Laboratory and/or medical tests (such as INR, complete blood count) must be performed periodically to monitor your progress or check for side effects. Consult your doctor for more  details.      MISSED DOSE:  For the best possible benefit, do not miss any doses. If you do miss a dose and remember on the same day, take it as soon as you remember. If you remember on the next day, skip the missed dose and resume your usual dosing schedule. Do not double the dose to catch up because this could increase your risk for bleeding. Keep a record of missed doses to give to your doctor or pharmacist. Contact your doctor or pharmacist if you miss 2 or more doses in a row.      STORAGE:  Store at room temperature away from light and moisture. Do not store in the bathroom. Keep all medications away from children and pets. Do not flush medications down the toilet or pour them into a drain unless instructed to do so. Properly discard this product when it is  or no longer needed. Consult your pharmacist or local waste disposal company for more details about how to safely discard your product.      MEDICAL ALERT:  Your condition and medication can cause complications in a medical emergency. For information about enrolling in MedicAlert, call 1-518.852.4506 ([place]) or 1-945.127.8630 ([place]).      Information last revised 2010 Copyright(c) 2010 [employer]             · Is patient Post Blood Transfusion?  No    Depression / Suicide Risk    As you are discharged from this [facility] facility, it is important to learn how to keep safe from harming yourself.    Recognize the warning signs:  · Abrupt changes in personality, positive or negative- including increase in energy   · Giving away possessions  · Change in eating patterns- significant weight changes-  positive or negative  · Change in sleeping patterns- unable to sleep or sleeping all the time   · Unwillingness or inability to communicate  · Depression  · Unusual sadness, discouragement and loneliness  · Talk of wanting to die  · Neglect of personal appearance   · Rebelliousness- reckless behavior  · Withdrawal from people/activities  they love  · Confusion- inability to concentrate     If you or a loved one observes any of these behaviors or has concerns about self-harm, here's what you can do:  · Talk about it- your feelings and reasons for harming yourself  · Remove any means that you might use to hurt yourself (examples: pills, rope, extension cords, firearm)  · Get professional help from the community (Mental Health, Substance Abuse, psychological counseling)  · Do not be alone:Call your Safe Contact- someone whom you trust who will be there for you.  · Call your local CRISIS HOTLINE 291-7648 or 864-163-1360  · Call your local Children's Mobile Crisis Response Team [place] (080) 875-3660 or www.10X10 Room  · Call the toll free National Suicide Prevention Hotlines   · National Suicide Prevention Lifeline 930-833-ZCPD (6104)  · [employer] 800-SUICIDE (555-0553)    Chest Wall Pain  Chest wall pain is pain in or around the bones and muscles of your chest. It may take up to 6 weeks to get better. It may take longer if you must stay physically active in your work and activities.   CAUSES   Chest wall pain may happen on its own. However, it may be caused by:  A viral illness like the flu.  Injury.  Coughing.  Exercise.  Arthritis.  Fibromyalgia.  Shingles.  HOME CARE INSTRUCTIONS   Avoid overtiring physical activity. Try not to strain or perform activities that cause pain. This includes any activities using your chest or your abdominal and side muscles, especially if heavy weights are used.  Put ice on the sore area.  Put ice in a plastic bag.  Place a towel between your skin and the bag.  Leave the ice on for 15-20 minutes per hour while awake for the first 2 days.  Only take over-the-counter or prescription medicines for pain, discomfort, or fever as directed by your caregiver.  SEEK IMMEDIATE MEDICAL CARE IF:   Your pain increases, or you are very uncomfortable.  You have a fever.  Your chest pain becomes worse.  You  have new, unexplained symptoms.  You have nausea or vomiting.  You feel sweaty or lightheaded.  You have a cough with phlegm (sputum), or you cough up blood.  MAKE SURE YOU:   Understand these instructions.  Will watch your condition.  Will get help right away if you are not doing well or get worse.     This information is not intended to replace advice given to you by your health care provider. Make sure you discuss any questions you have with your health care provider.     Document Released: 12/18/2006 Document Revised: 03/11/2013 Document Reviewed: 03/14/2016  [employer] Interactive Patient Education ©2016 [employer]

## 2017-03-19 NOTE — PROGRESS NOTES
Report received and care of pt assumed. POC discussed with patient at bedside. Orientation of unit given, safety measures in place. Patient complains of 8/10 chest pain on admission to unit. See mar. Tele box on, monitors aware. Will continue to round.

## 2017-03-19 NOTE — DISCHARGE SUMMARY
FAMILY MEDICINE DISCHARGE SUMMARY     PATIENT ID:    Name:             Morales Olivier   YOB: 1955  Age:                 61 y.o.  male   MRN:               [id number]  Address:         [address] [address]  Phone:            [phone] (home)    ADMISSION DATE: 3/18/2017    DISCHARGE DATE: 3/19/2017    DISCHARGE DIAGNOSES:   See hospital course.     ATTENDING PHYSICIAN: Dr. Kim    SENIOR RESIDENT: Dr. Huber    YVAN RESIDENT: Dr. Jose Munoz    CONSULTANTS:    None, although originally admitted to Hospitalist Dr. Carvajal  PROCEDURES:   none    LABS:  Recent Labs      03/18/17   1227   WBC  10.0   RBC  3.88*   HEMOGLOBIN  12.0*   HEMATOCRIT  36.7*   MCV  94.6   MCH  30.9   RDW  47.8   PLATELETCT  170   MPV  10.0   NEUTSPOLYS  87.60*   LYMPHOCYTES  5.30*   MONOCYTES  3.50   EOSINOPHILS  0.90   BASOPHILS  0.00   RBCMORPHOLO  Present     Recent Labs      03/18/17   1227   SODIUM  136   POTASSIUM  4.1   CHLORIDE  102   CO2  29   GLUCOSE  80   BUN  15     Lab Results   Component Value Date/Time    CHOLESTEROL, 12/06/2016 01:45 PM    * 12/06/2016 01:45 PM    HDL 24* 12/06/2016 01:45 PM    TRIGLYCERIDES 189* 12/06/2016 01:45 PM       Lab Results   Component Value Date/Time    TROPONIN I <0.01 03/18/2017 09:48 PM    CK-MB 8.2* 06/14/2014 10:10 AM       Lab Results   Component Value Date/Time    TROPONIN I <0.01 03/18/2017 09:48 PM    CK-MB 8.2* 06/14/2014 10:10 AM         IMAGING:   ECHOCARDIOGRAM-COMP W/ CONT   Final Result      CT-CTA CHEST PULMONARY ARTERY W/ RECONS   Final Result      1.  No evidence of pulmonary embolus.      2.  Postsurgical change consistent with recent median sternotomy with fluid within the sternotomy defect along the inferior aspect of the sternum with separation of the sternotomy defect by approximately 15 mm raising the possibility of sternal    dehiscence. Most inferior sternotomy wire does not appear to extend through the right side of the sternum.       3.  Moderate right and trace left pleural effusions.                      HISTORY OF PRESENT ILLNESS:   Patient is a 61-year-old male with history of    redo mitral valve replacement on 3/8/17, severe prosthetic mitral stenosis,    diabetes, hypertension, CHF, COPD, systolic and diastolic heart failure, DVT,    evaluated here at [facility] with complaints of chest pain.  Patient reports    left-sided, onset this morning.  Patient is a poor historian, reports duration   approximately 2-3 hours.  He had fluttering and chest palpitations.  He    reports worsened with movement, also pain in the left arm, shoulder blade    area, now symptoms have resolved.  He has had some nausea without vomiting,    leg swelling.  No shortness of breath.  No fevers or chills.  No localized    weakness.  No abdominal pain, melena, hematochezia, calf pain.    HOSPITAL COURSE:   #Chest pain, musculoskeletal/postsurgical   -Patient was admitted to observation status on the telemetry floor after ERP Dr. Clark discussed case with Dr. Beard, cardiothoracic surgery on-call, per his recommendations.  A recent Cardiac catheterization on 12/14/16 revealing patent coronary arteries.   CT chest down in the ED showed postsurgical change consistent with recent median sternotomy with fluid within the sternotomy defect along the inferior aspect of the sternum with separation of the sternotomy defect by approximately 15 mm raising the possibility of sternal dehiscence possible.    Patient was also had transthoracic ECHO which showed improved from previous following mitral valve re-replacement    Was monitored on telemetry overnight with no acute events, troponins were trended with negative results x3.  Given norco and morphine for pain control with good effect.      #Systolic and Diasytolic CHF  -Bilateral pleural effusions without hypoxemia were noted on imaging done in ED.  He had no acute shortness of breath.  S/p AICD with pacer as mentioned  above ECHO: 3/18 Left ventricular ejection fraction is visually estimated to be 60%, improved from previous of 35%. With apical inferior septal hypokinesis.  Patient did not appear volume overloaded and outpatient lasix was continued.    #H/O Diabetes mellitus, now prediabetes  Accuchecks with blood glucose  preformed during stay.  Insulin sliding scale and previous dose of metformin were continued.       # h/o atrial fibrillation.     Patient was continued home amiodarone and Coumadin, dosed at 7.5 mg daily, INR obtained on HD#2 was noted to be subtherapeutic at 1.3, (target 2.5-3.5 for a fib)  He is also s/p maze procedure in 2008.     #Anemia, suspect MAHA given mitral valve history and resent surgery  Stable during admission without symptoms of bleed.    #h/o DVT  On warfarin as above, is s/p thrombectomy 2009.  Not acute concerns during hospitalization    Hypertension,    Normotensive during stay, continued on outpatient metoprolol 25 mg BID    BPH  Continued on home Flomax.      DISCHARGE CONDITION:  Stable    DISPOSITION: [facility]    DISCHARGE MEDICATIONS:      Morales Olivier   Home Medication Instructions MICHELLE:[id number]    Printed on:03/19/17 1658   Medication Information                      albuterol 108 (90 BASE) MCG/ACT Aero Soln inhalation aerosol  Inhale 2 Puffs by mouth every 6 hours as needed for Shortness of Breath.             amiodarone (PACERONE) 400 MG tablet  Take 1 Tab by mouth every day.             aspirin EC 81 MG EC tablet  Take 1 Tab by mouth every day.             benzonatate (TESSALON) 100 MG Cap  Take 100 mg by mouth 3 times a day as needed for Cough.             bisacodyl (DULCOLAX) 10 MG Suppos  Insert 10 mg in rectum Once.             furosemide (LASIX) 40 MG Tab  Take 1 Tab by mouth every day.             guaifenesin DM sugar free (DIABETIC TUSSIN DM)  MG/5ML Liquid soln  Take 10 mL by mouth every 6 hours as needed for Cough.              hydrocodone-acetaminophen (NORCO) 5-325 MG Tab per tablet  Take 1-2 Tabs by mouth every 6 hours as needed.             insulin glargine (LANTUS) 100 UNIT/ML Solution  Inject 10 Units as instructed 2 times a day.             insulin lispro (HUMALOG) 100 UNIT/ML Solution  Inject 0-10 Units as instructed 3 times a day before meals. Sliding Scale:  151-200 = 2 units  201-250 = 4 units  251-300 = 6 units  301-350 = 8 units  351-400 = 10 units             lactulose 10 GM/15ML Solution  Take 20 g by mouth 3 times a day.             melatonin 3 MG Tab  Take 3 mg by mouth every bedtime.             metformin (GLUCOPHAGE) 500 MG Tab  Take 1 Tab by mouth 2 times a day, with meals.             metoprolol (LOPRESSOR) 25 MG TABS  Take 1 Tab by mouth 2 Times a Day.             ondansetron (ZOFRAN ODT) 4 MG TABLET DISPERSIBLE  Take 4 mg by mouth every 6 hours as needed for Nausea/Vomiting.             phytonadione (AQUA-MEPHYTON) 1 MG/0.5ML Solution  5 mg by Intramuscular route Once.             polyethylene glycol/lytes (MIRALAX) Pack  Take 17 g by mouth every day.             potassium chloride SA (KDUR) 20 MEQ Tab CR  Take 1 Tab by mouth every day.             tamsulosin (FLOMAX) 0.4 MG capsule  Take 0.4 mg by mouth ONE-HALF HOUR AFTER BREAKFAST.             temazepam (RESTORIL) 15 MG Cap  Take 15 mg by mouth at bedtime as needed for Sleep.             warfarin (COUMADIN) 7.5 MG Tab  Take 1 Tab by mouth every day. Titrate to INR 2-3.                 ACTIVITY:  Normal Activity as Tolerated.    DIET: Cardiac    DISCHARGE INSTRUCTIONS:    Patient was instructed to return to the ER or contact the primary care physician immediately with any concerns or symptoms including but not limited to fever, chills, loss of appetite, weight loss, chest pain (outside of expected post-surgical pain), shortness of breath, fatigue, nausea, vomiting, diarrhea, bleeding, the development of any new wounds or lesions or any developing redness,  swelling or warmth in any existing wounds or lesions.  Patient understands that failure to do so may indicate worsening of his/her medical condition(s) and result in adverse clinical outcomes including fatality. We have counseled the patient on the importance of compliance and the patient has agreed to proceed with all medical recommendations and follow up plan indicated above. The patient understands that the failure to do so may result in result in adverse clinical outcomes including fatality.     FOLLOW UP:   Follow up with [facility] physician as previously arranged  Cardiologist as scheduled  Dr. Huber, [facility]  1 week following d/c from [facility]    CC:   Dr. Nelia Wright

## 2017-03-19 NOTE — RESPIRATORY CARE
COPD EDUCATION by COPD CLINICAL EDUCATOR  3/19/2017 at 6:32 AM by Britt Del Real     Patient reviewed by COPD education team. Patient does not qualify for COPD program.

## 2017-03-19 NOTE — PROGRESS NOTES
Confirmed with Dr. Munoz of [facility] patient okay to discharge. All lines and monitor discontinued. Discharge instructions and medications discussed with patient, all questions answered, patient verbalizes understanding. Follow up appointments to be scheduled by patient after [facility]. Pneumonia and influenza vaccines refused. Discharge instructions, prescriptions, and personal belongings with patient. Patient down to transport van with [facility] representitive, off unit without incident. Tele monitor signed in and returned to [facility].

## 2017-04-03 ENCOUNTER — APPOINTMENT (OUTPATIENT)
Dept: RADIOLOGY | Facility: MEDICAL CENTER | Age: 62
End: 2017-04-03
Attending: EMERGENCY MEDICINE
Payer: MEDICARE

## 2017-04-03 ENCOUNTER — HOSPITAL ENCOUNTER (OUTPATIENT)
Facility: MEDICAL CENTER | Age: 62
End: 2017-04-04
Attending: EMERGENCY MEDICINE | Admitting: FAMILY MEDICINE
Payer: MEDICARE

## 2017-04-03 DIAGNOSIS — R10.11 RIGHT UPPER QUADRANT ABDOMINAL PAIN: ICD-10-CM

## 2017-04-03 PROBLEM — R10.9 ABDOMINAL PAIN: Status: ACTIVE | Noted: 2017-04-03

## 2017-04-03 LAB
ALBUMIN SERPL BCP-MCNC: 3.9 G/DL (ref 3.2–4.9)
ALBUMIN/GLOB SERPL: 1.4 G/DL
ALP SERPL-CCNC: 89 U/L (ref 30–99)
ALT SERPL-CCNC: 8 U/L (ref 2–50)
ANION GAP SERPL CALC-SCNC: 9 MMOL/L (ref 0–11.9)
APTT PPP: 43.1 SEC (ref 24.7–36)
AST SERPL-CCNC: 15 U/L (ref 12–45)
BASOPHILS # BLD AUTO: 0.7 % (ref 0–1.8)
BASOPHILS # BLD: 0.06 K/UL (ref 0–0.12)
BILIRUB SERPL-MCNC: 0.5 MG/DL (ref 0.1–1.5)
BNP SERPL-MCNC: 166 PG/ML (ref 0–100)
BUN SERPL-MCNC: 18 MG/DL (ref 8–22)
CALCIUM SERPL-MCNC: 9.2 MG/DL (ref 8.5–10.5)
CHLORIDE SERPL-SCNC: 103 MMOL/L (ref 96–112)
CO2 SERPL-SCNC: 24 MMOL/L (ref 20–33)
CREAT SERPL-MCNC: 1.12 MG/DL (ref 0.5–1.4)
EKG IMPRESSION: NORMAL
EOSINOPHIL # BLD AUTO: 0.79 K/UL (ref 0–0.51)
EOSINOPHIL NFR BLD: 9.6 % (ref 0–6.9)
ERYTHROCYTE [DISTWIDTH] IN BLOOD BY AUTOMATED COUNT: 45.1 FL (ref 35.9–50)
GFR SERPL CREATININE-BSD FRML MDRD: >60 ML/MIN/1.73 M 2
GLOBULIN SER CALC-MCNC: 2.8 G/DL (ref 1.9–3.5)
GLUCOSE SERPL-MCNC: 92 MG/DL (ref 65–99)
HCT VFR BLD AUTO: 39 % (ref 42–52)
HGB BLD-MCNC: 13.1 G/DL (ref 14–18)
IMM GRANULOCYTES # BLD AUTO: 0.09 K/UL (ref 0–0.11)
IMM GRANULOCYTES NFR BLD AUTO: 1.1 % (ref 0–0.9)
INR PPP: 2.61 (ref 0.87–1.13)
LIPASE SERPL-CCNC: 21 U/L (ref 11–82)
LYMPHOCYTES # BLD AUTO: 0.92 K/UL (ref 1–4.8)
LYMPHOCYTES NFR BLD: 11.2 % (ref 22–41)
MCH RBC QN AUTO: 30.8 PG (ref 27–33)
MCHC RBC AUTO-ENTMCNC: 33.6 G/DL (ref 33.7–35.3)
MCV RBC AUTO: 91.5 FL (ref 81.4–97.8)
MONOCYTES # BLD AUTO: 0.66 K/UL (ref 0–0.85)
MONOCYTES NFR BLD AUTO: 8 % (ref 0–13.4)
NEUTROPHILS # BLD AUTO: 5.71 K/UL (ref 1.82–7.42)
NEUTROPHILS NFR BLD: 69.4 % (ref 44–72)
NRBC # BLD AUTO: 0 K/UL
NRBC BLD AUTO-RTO: 0 /100 WBC
PLATELET # BLD AUTO: 140 K/UL (ref 164–446)
PMV BLD AUTO: 9.2 FL (ref 9–12.9)
POTASSIUM SERPL-SCNC: 4.4 MMOL/L (ref 3.6–5.5)
PROT SERPL-MCNC: 6.7 G/DL (ref 6–8.2)
PROTHROMBIN TIME: 28.7 SEC (ref 12–14.6)
RBC # BLD AUTO: 4.26 M/UL (ref 4.7–6.1)
SODIUM SERPL-SCNC: 136 MMOL/L (ref 135–145)
TROPONIN I SERPL-MCNC: 0.02 NG/ML (ref 0–0.04)
WBC # BLD AUTO: 8.2 K/UL (ref 4.8–10.8)

## 2017-04-03 PROCEDURE — 76705 ECHO EXAM OF ABDOMEN: CPT

## 2017-04-03 PROCEDURE — 36415 COLL VENOUS BLD VENIPUNCTURE: CPT

## 2017-04-03 PROCEDURE — 99285 EMERGENCY DEPT VISIT HI MDM: CPT

## 2017-04-03 PROCEDURE — 80053 COMPREHEN METABOLIC PANEL: CPT

## 2017-04-03 PROCEDURE — 700105 HCHG RX REV CODE 258: Performed by: EMERGENCY MEDICINE

## 2017-04-03 PROCEDURE — 83690 ASSAY OF LIPASE: CPT

## 2017-04-03 PROCEDURE — 700102 HCHG RX REV CODE 250 W/ 637 OVERRIDE(OP): Performed by: EMERGENCY MEDICINE

## 2017-04-03 PROCEDURE — 85610 PROTHROMBIN TIME: CPT

## 2017-04-03 PROCEDURE — G0378 HOSPITAL OBSERVATION PER HR: HCPCS

## 2017-04-03 PROCEDURE — 71010 DX-CHEST-LIMITED (1 VIEW): CPT

## 2017-04-03 PROCEDURE — 85730 THROMBOPLASTIN TIME PARTIAL: CPT

## 2017-04-03 PROCEDURE — 84484 ASSAY OF TROPONIN QUANT: CPT

## 2017-04-03 PROCEDURE — 93005 ELECTROCARDIOGRAM TRACING: CPT

## 2017-04-03 PROCEDURE — A9270 NON-COVERED ITEM OR SERVICE: HCPCS | Performed by: EMERGENCY MEDICINE

## 2017-04-03 PROCEDURE — 85025 COMPLETE CBC W/AUTO DIFF WBC: CPT

## 2017-04-03 PROCEDURE — 83880 ASSAY OF NATRIURETIC PEPTIDE: CPT

## 2017-04-03 RX ORDER — HEPARIN SODIUM 5000 [USP'U]/ML
5000 INJECTION, SOLUTION INTRAVENOUS; SUBCUTANEOUS EVERY 8 HOURS
Status: DISCONTINUED | OUTPATIENT
Start: 2017-04-04 | End: 2017-04-04

## 2017-04-03 RX ORDER — TAMSULOSIN HYDROCHLORIDE 0.4 MG/1
0.4 CAPSULE ORAL
Status: DISCONTINUED | OUTPATIENT
Start: 2017-04-04 | End: 2017-04-04 | Stop reason: HOSPADM

## 2017-04-03 RX ORDER — AMOXICILLIN 250 MG
2 CAPSULE ORAL 2 TIMES DAILY
Status: DISCONTINUED | OUTPATIENT
Start: 2017-04-04 | End: 2017-04-04 | Stop reason: HOSPADM

## 2017-04-03 RX ORDER — ACETAMINOPHEN 325 MG/1
650 TABLET ORAL EVERY 6 HOURS PRN
Status: DISCONTINUED | OUTPATIENT
Start: 2017-04-03 | End: 2017-04-04 | Stop reason: HOSPADM

## 2017-04-03 RX ORDER — AMIODARONE HYDROCHLORIDE 200 MG/1
400 TABLET ORAL DAILY
Status: DISCONTINUED | OUTPATIENT
Start: 2017-04-04 | End: 2017-04-04 | Stop reason: HOSPADM

## 2017-04-03 RX ORDER — PROMETHAZINE HYDROCHLORIDE 25 MG/1
12.5-25 SUPPOSITORY RECTAL EVERY 4 HOURS PRN
Status: DISCONTINUED | OUTPATIENT
Start: 2017-04-03 | End: 2017-04-04 | Stop reason: HOSPADM

## 2017-04-03 RX ORDER — BISACODYL 10 MG
10 SUPPOSITORY, RECTAL RECTAL
Status: DISCONTINUED | OUTPATIENT
Start: 2017-04-03 | End: 2017-04-04 | Stop reason: HOSPADM

## 2017-04-03 RX ORDER — SODIUM CHLORIDE 9 MG/ML
1000 INJECTION, SOLUTION INTRAVENOUS CONTINUOUS
Status: DISCONTINUED | OUTPATIENT
Start: 2017-04-03 | End: 2017-04-04

## 2017-04-03 RX ORDER — ONDANSETRON 2 MG/ML
4 INJECTION INTRAMUSCULAR; INTRAVENOUS EVERY 4 HOURS PRN
Status: DISCONTINUED | OUTPATIENT
Start: 2017-04-03 | End: 2017-04-04 | Stop reason: HOSPADM

## 2017-04-03 RX ORDER — POLYETHYLENE GLYCOL 3350 17 G/17G
1 POWDER, FOR SOLUTION ORAL
Status: DISCONTINUED | OUTPATIENT
Start: 2017-04-03 | End: 2017-04-04 | Stop reason: HOSPADM

## 2017-04-03 RX ORDER — ENALAPRILAT 1.25 MG/ML
1.25 INJECTION INTRAVENOUS EVERY 6 HOURS PRN
Status: DISCONTINUED | OUTPATIENT
Start: 2017-04-03 | End: 2017-04-04 | Stop reason: HOSPADM

## 2017-04-03 RX ORDER — PROMETHAZINE HYDROCHLORIDE 25 MG/1
12.5-25 TABLET ORAL EVERY 4 HOURS PRN
Status: DISCONTINUED | OUTPATIENT
Start: 2017-04-03 | End: 2017-04-04 | Stop reason: HOSPADM

## 2017-04-03 RX ORDER — DEXTROSE MONOHYDRATE 25 G/50ML
25 INJECTION, SOLUTION INTRAVENOUS
Status: DISCONTINUED | OUTPATIENT
Start: 2017-04-03 | End: 2017-04-04 | Stop reason: HOSPADM

## 2017-04-03 RX ORDER — ONDANSETRON 4 MG/1
4 TABLET, ORALLY DISINTEGRATING ORAL EVERY 4 HOURS PRN
Status: DISCONTINUED | OUTPATIENT
Start: 2017-04-03 | End: 2017-04-04 | Stop reason: HOSPADM

## 2017-04-03 RX ORDER — HYDROCODONE BITARTRATE AND ACETAMINOPHEN 5; 325 MG/1; MG/1
1 TABLET ORAL ONCE
Status: COMPLETED | OUTPATIENT
Start: 2017-04-03 | End: 2017-04-03

## 2017-04-03 RX ADMIN — HYDROCODONE BITARTRATE AND ACETAMINOPHEN 1 TABLET: 5; 325 TABLET ORAL at 21:39

## 2017-04-03 RX ADMIN — SODIUM CHLORIDE 1000 ML: 9 INJECTION, SOLUTION INTRAVENOUS at 18:46

## 2017-04-03 ASSESSMENT — LIFESTYLE VARIABLES: DO YOU DRINK ALCOHOL: NO

## 2017-04-03 ASSESSMENT — PAIN SCALES - GENERAL: PAINLEVEL_OUTOF10: 4

## 2017-04-03 NOTE — IP AVS SNAPSHOT
" Home Care Instructions                                                                                                                  Name:Morales Olivier  Medical Record Number:[id number]  CSN: [id number]    YOB: 1955   Age: 61 y.o.  Sex: male  HT:1.778 m (5' 10\") WT: 108.1 kg (238 lb 5.1 oz)          Admit Date: 4/3/2017     Discharge Date:   Today's Date: 4/4/2017  Attending Doctor:  [facility]                  Allergies:  Review of patient's allergies indicates no known allergies.            Discharge Instructions       Discharge Instructions    Discharged to home by taxi with self. Discharged via wheelchair, hospital escort: Refused.  Special equipment needed: Cane    Be sure to schedule a follow-up appointment with your primary care doctor or any specialists as instructed.     Discharge Plan:   Diet Plan: Discussed  Activity Level: Discussed  Confirmed Follow up Appointment: Patient to Call and Schedule Appointment  Confirmed Symptoms Management: Discussed  Medication Reconciliation Updated: Yes  Influenza Vaccine Indication: Patient Refuses    I understand that a diet low in cholesterol, fat, and sodium is recommended for good health. Unless I have been given specific instructions below for another diet, I accept this instruction as my diet prescription.   Other diet: diabetic cardiac diet    Special Instructions: None    · Is patient discharged on Warfarin / Coumadin?   Yes    You are receiving the drug warfarin. Please understand the importance of monitoring warfarin with scheduled PT/INR blood draws.  Follow-up with a call to your personal Doctor's office (PATIENT STATES HE'S ESTABLISHED INR MONITORING WITH [facility]) in 3 days to schedule a PT/INR. .     IMPORTANT: HOW TO USE THIS INFORMATION:  This is a summary and does NOT have all possible information about this product. This information does not assure that this product is safe, effective, or appropriate for you. This " "information is not individual medical advice and does not substitute for the advice of your health care professional. Always ask your health care professional for complete information about this product and your specific health needs.      WARFARIN - ORAL (WARF-uh-rin)      COMMON BRAND NAME(S): Coumadin      WARNING:  Warfarin can cause very serious (possibly fatal) bleeding. This is more likely to occur when you first start taking this medication or if you take too much warfarin. To decrease your risk for bleeding, your doctor or other health care provider will monitor you closely and check your lab results (INR test) to make sure you are not taking too much warfarin. Keep all medical and laboratory appointments. Tell your doctor right away if you notice any signs of serious bleeding. See also Side Effects section.      USES:  This medication is used to treat blood clots (such as in deep vein thrombosis-DVT or pulmonary embolus-PE) and/or to prevent new clots from forming in your body. Preventing harmful blood clots helps to reduce the risk of a stroke or heart attack. Conditions that increase your risk of developing blood clots include a certain type of irregular heart rhythm (atrial fibrillation), heart valve replacement, recent heart attack, and certain surgeries (such as hip/knee replacement). Warfarin is commonly called a \"blood thinner,\" but the more correct term is \"anticoagulant.\" It helps to keep blood flowing smoothly in your body by decreasing the amount of certain substances (clotting proteins) in your blood.      HOW TO USE:  Read the Medication Guide provided by your pharmacist before you start taking warfarin and each time you get a refill. If you have any questions, ask your doctor or pharmacist. Take this medication by mouth with or without food as directed by your doctor or other health care professional, usually once a day. It is very important to take it exactly as directed. Do not increase the " dose, take it more frequently, or stop using it unless directed by your doctor. Dosage is based on your medical condition, laboratory tests (such as INR), and response to treatment. Your doctor or other health care provider will monitor you closely while you are taking this medication to determine the right dose for you. Use this medication regularly to get the most benefit from it. To help you remember, take it at the same time each day. It is important to eat a balanced, consistent diet while taking warfarin. Some foods can affect how warfarin works in your body and may affect your treatment and dose. Avoid sudden large increases or decreases in your intake of foods high in vitamin K (such as broccoli, cauliflower, cabbage, brussels sprouts, kale, spinach, and other green leafy vegetables, liver, green tea, certain vitamin supplements). If you are trying to lose weight, check with your doctor before you try to go on a diet. Cranberry products may also affect how your warfarin works. Limit the amount of cranberry juice (16 ounces/480 milliliters a day) or other cranberry products you may drink or eat.      SIDE EFFECTS:  Nausea, loss of appetite, or stomach/abdominal pain may occur. If any of these effects persist or worsen, tell your doctor or pharmacist promptly. Remember that your doctor has prescribed this medication because he or she has judged that the benefit to you is greater than the risk of side effects. Many people using this medication do not have serious side effects. This medication can cause serious bleeding if it affects your blood clotting proteins too much (shown by unusually high INR lab results). Even if your doctor stops your medication, this risk of bleeding can continue for up to a week. Tell your doctor right away if you have any signs of serious bleeding, including: unusual pain/swelling/discomfort, unusual/easy bruising, prolonged bleeding from cuts or gums, persistent/frequent nosebleeds,  unusually heavy/prolonged menstrual flow, pink/dark urine, coughing up blood, vomit that is bloody or looks like coffee grounds, severe headache, dizziness/fainting, unusual or persistent tiredness/weakness, bloody/black/tarry stools, chest pain, shortness of breath, difficulty swallowing. Tell your doctor right away if any of these unlikely but serious side effects occur: persistent nausea/vomiting, severe stomach/abdominal pain, yellowing eyes/skin. This drug rarely has caused very serious (possibly fatal) problems if its effects lead to small blood clots (usually at the beginning of treatment). This can lead to severe skin/tissue damage that may require surgery or amputation if left untreated. Patients with certain blood conditions (protein C or S deficiency) may be at greater risk. Get medical help right away if any of these rare but serious side effects occur: painful/red/purplish patches on the skin (such as on the toe, breast, abdomen), change in the amount of urine, vision changes, confusion, slurred speech, weakness on one side of the body. A very serious allergic reaction to this drug is rare. However, get medical help right away if you notice any symptoms of a serious allergic reaction, including: rash, itching/swelling (especially of the face/tongue/throat), severe dizziness, trouble breathing. This is not a complete list of possible side effects. If you notice other effects not listed above, contact your doctor or pharmacist. In the [place] - Call your doctor for medical advice about side effects. You may report side effects to FDA at 3-617-FVW-9384. In [place] - Call your doctor for medical advice about side effects. You may report side effects to [facility] at 1-678.903.4676.      PRECAUTIONS:  Before taking warfarin, tell your doctor or pharmacist if you are allergic to it; or if you have any other allergies. This product may contain inactive ingredients, which can cause allergic reactions or other  problems. Talk to your pharmacist for more details. Before using this medication, tell your doctor or pharmacist your medical history, especially of: blood disorders (such as anemia, hemophilia), bleeding problems (such as bleeding of the stomach/intestines, bleeding in the brain), blood vessel disorders (such as aneurysms), recent major injury/surgery, liver disease, alcohol use, mental/mood disorders (including memory problems), frequent falls/injuries. It is important that all your doctors and dentists know that you take warfarin. Before having surgery or any medical/dental procedures, tell your doctor or dentist that you are taking this medication and about all the products you use (including prescription drugs, nonprescription drugs, and herbal products). Avoid getting injections into the muscles. If you must have an injection into a muscle (for example, a flu shot), it should be given in the arm. This way, it will be easier to check for bleeding and/or apply pressure bandages. This medication may cause stomach bleeding. Daily use of alcohol while using this medicine will increase your risk for stomach bleeding and may also affect how this medication works. Limit or avoid alcoholic beverages. If you have not been eating well, if you have an illness or infection that causes fever, vomiting, or diarrhea for more than 2 days, or if you start using any antibiotic medications, contact your doctor or pharmacist immediately because these conditions can affect how warfarin works. This medication can cause heavy bleeding. To lower the chance of getting cut, bruised, or injured, use great caution with sharp objects like safety razors and nail cutters. Use an electric razor when shaving and a soft toothbrush when brushing your teeth. Avoid activities such as contact sports. If you fall or injure yourself, especially if you hit your head, call your doctor immediately. Your doctor may need to check you. The Food & Drug  "Administration has stated that generic warfarin products are interchangeable. However, consult your doctor or pharmacist before switching warfarin products. Be careful not to take more than one medication that contains warfarin unless specifically directed by the doctor or health care provider who is monitoring your warfarin treatment. Older adults may be at greater risk for bleeding while using this drug. This medication is not recommended for use during pregnancy because of serious (possibly fatal) harm to an unborn baby. Discuss the use of reliable forms of birth control with your doctor. If you become pregnant or think you may be pregnant, tell your doctor immediately. If you are planning pregnancy, discuss a plan for managing your condition with your doctor before you become pregnant. Your doctor may switch the type of medication you use during pregnancy. Very small amounts of this medication may pass into breast milk but is unlikely to harm a nursing infant. Consult your doctor before breast-feeding.      DRUG INTERACTIONS:  Drug interactions may change how your medications work or increase your risk for serious side effects. This document does not contain all possible drug interactions. Keep a list of all the products you use (including prescription/nonprescription drugs and herbal products) and share it with your doctor and pharmacist. Do not start, stop, or change the dosage of any medicines without your doctor's approval. Warfarin interacts with many prescription, nonprescription, vitamin, and herbal products. This includes medications that are applied to the skin or inside the vagina or rectum. The interactions with warfarin usually result in an increase or decrease in the \"blood-thinning\" (anticoagulant) effect. Your doctor or other health care professional should closely monitor you to prevent serious bleeding or clotting problems. While taking warfarin, it is very important to tell your doctor or " pharmacist of any changes in medications, vitamins, or herbal products that you are taking. Some products that may interact with this drug include: capecitabine, imatinib, mifepristone. Aspirin, aspirin-like drugs (salicylates), and nonsteroidal anti-inflammatory drugs (NSAIDs such as ibuprofen, naproxen, celecoxib) may have effects similar to warfarin. These drugs may increase the risk of bleeding problems if taken during treatment with warfarin. Carefully check all prescription/nonprescription product labels (including drugs applied to the skin such as pain-relieving creams) since the products may contain NSAIDs or salicylates. Talk to your doctor about using a different medication (such as acetaminophen) to treat pain/fever. Low-dose aspirin and related drugs (such as clopidogrel, ticlopidine) should be continued if prescribed by your doctor for specific medical reasons such as heart attack or stroke prevention. Consult your doctor or pharmacist for more details. Many herbal products interact with warfarin. Tell your doctor before taking any herbal products, especially bromelains, coenzyme Q10, cranberry, danshen, dong quai, fenugreek, garlic, ginkgo biloba, ginseng, and Thong's wort, among others. This medication may interfere with a certain laboratory test to measure theophylline levels, possibly causing false test results. Make sure laboratory personnel and all your doctors know you use this drug.      OVERDOSE:  If overdose is suspected, contact a poison control center or emergency room immediately. [place] residents can call the [place] National Poison Hotline at 1-886.526.9049. [place] residents can call a provincial poison control center. Symptoms of overdose may include: bloody/black/tarry stools, pink/dark urine, unusual/prolonged bleeding.      NOTES:  Do not share this medication with others. Laboratory and/or medical tests (such as INR, complete blood count) must be performed periodically to monitor your  progress or check for side effects. Consult your doctor for more details.      MISSED DOSE:  For the best possible benefit, do not miss any doses. If you do miss a dose and remember on the same day, take it as soon as you remember. If you remember on the next day, skip the missed dose and resume your usual dosing schedule. Do not double the dose to catch up because this could increase your risk for bleeding. Keep a record of missed doses to give to your doctor or pharmacist. Contact your doctor or pharmacist if you miss 2 or more doses in a row.      STORAGE:  Store at room temperature away from light and moisture. Do not store in the bathroom. Keep all medications away from children and pets. Do not flush medications down the toilet or pour them into a drain unless instructed to do so. Properly discard this product when it is  or no longer needed. Consult your pharmacist or local waste disposal company for more details about how to safely discard your product.      MEDICAL ALERT:  Your condition and medication can cause complications in a medical emergency. For information about enrolling in MedicAlert, call 1-687.451.1398 ([place]) or 1-400.759.3524 ([place]).      Information last revised 2010 Copyright(c) 2010 [employer]             · Is patient Post Blood Transfusion?  No    Depression / Suicide Risk    As you are discharged from this [facility] facility, it is important to learn how to keep safe from harming yourself.    Recognize the warning signs:  · Abrupt changes in personality, positive or negative- including increase in energy   · Giving away possessions  · Change in eating patterns- significant weight changes-  positive or negative  · Change in sleeping patterns- unable to sleep or sleeping all the time   · Unwillingness or inability to communicate  · Depression  · Unusual sadness, discouragement and loneliness  · Talk of wanting to die  · Neglect of personal  appearance   · Rebelliousness- reckless behavior  · Withdrawal from people/activities they love  · Confusion- inability to concentrate     If you or a loved one observes any of these behaviors or has concerns about self-harm, here's what you can do:  · Talk about it- your feelings and reasons for harming yourself  · Remove any means that you might use to hurt yourself (examples: pills, rope, extension cords, firearm)  · Get professional help from the community (Mental Health, Substance Abuse, psychological counseling)  · Do not be alone:Call your Safe Contact- someone whom you trust who will be there for you.  · Call your local CRISIS HOTLINE 286-3432 or 866-457-1039  · Call your local Children's Mobile Crisis Response Team [place] (478) 835-7229 or www."[employer]"  · Call the toll free National Suicide Prevention Hotlines   · National Suicide Prevention Lifeline 780-809-XDQO (7931)  · [employer] 800-SUICIDE (418-2053)        Your appointments     Apr 19, 2017 11:20 AM   FOLLOW UP with Sumanth Yancey M.D.   [facility] (--)    [address], [address]  [place] [address] [phone]              Follow-up Information     1. Follow up with Nelia Huber M.D.. Schedule an appointment as soon as possible for a visit in 2 weeks.    Specialty:  Family Medicine    Why:  follow up appointment    Contact information    [address]  [address]  [place] [address] [phone]           Discharge Medication Instructions:    Below are the medications your physician expects you to take upon discharge:    Review all your home medications and newly ordered medications with your doctor and/or pharmacist. Follow medication instructions as directed by your doctor and/or pharmacist.    Please keep your medication list with you and share with your physician.               Medication List      CONTINUE taking these medications        Instructions    amiodarone 400 MG  tablet   Last time this was given:  400 mg on 4/4/2017 10:19 AM   Commonly known as:  PACERONE    Take 1 Tab by mouth every day.   Dose:  400 mg       metformin 500 MG Tabs   Last time this was given:  500 mg on 4/4/2017  5:24 PM   Commonly known as:  GLUCOPHAGE    Take 1 Tab by mouth 2 times a day, with meals.   Dose:  500 mg       metoprolol 25 MG Tabs   Last time this was given:  25 mg on 4/4/2017 10:19 AM   Commonly known as:  LOPRESSOR    Take 1 Tab by mouth 2 Times a Day.   Dose:  25 mg       tamsulosin 0.4 MG capsule   Last time this was given:  0.4 mg on 4/4/2017 10:18 AM   Commonly known as:  FLOMAX    Take 0.4 mg by mouth ONE-HALF HOUR AFTER BREAKFAST.   Dose:  0.4 mg       * warfarin 5 MG Tabs   Last time this was given:  2.5 mg on 4/4/2017  5:24 PM   Commonly known as:  COUMADIN    Take 5 mg by mouth every Monday, Wednesday, Friday, and Saturday. Indications: Blood Clot in a Deep Vein, mitral valve replacement   Dose:  5 mg       * warfarin 2.5 MG Tabs   Last time this was given:  2.5 mg on 4/4/2017  5:24 PM   Commonly known as:  COUMADIN    Take 2.5 mg by mouth every Tuesday and Thursday. Indications: Blood Clot in a Deep Vein   Dose:  2.5 mg       * Notice:  This list has 2 medication(s) that are the same as other medications prescribed for you. Read the directions carefully, and ask your doctor or other care provider to review them with you.            Instructions           Diet / Nutrition:    Follow any diet instructions given to you by your doctor or the dietician, including how much salt (sodium) you are allowed each day.    If you are overweight, talk to your doctor about a weight reduction plan.    Activity:    Remain physically active following your doctor's instructions about exercise and activity.    Rest often.     Any time you become even a little tired or short of breath, SIT DOWN and rest.    Worsening Symptoms:    Report any of the following signs and symptoms to the doctor's office  immediately:    *Pain of jaw, arm, or neck  *Chest pain not relieved by medication                               *Dizziness or loss of consciousness  *Difficulty breathing even when at rest   *More tired than usual                                       *Bleeding drainage or swelling of surgical site  *Swelling of feet, ankles, legs or stomach                 *Fever (>100ºF)  *Pink or blood tinged sputum  *Weight gain (3lbs/day or 5lbs /week)           *Shock from internal defibrillator (if applicable)  *Palpitations or irregular heartbeats                *Cool and/or numb extremities    Stroke Awareness    Common Risk Factors for Stroke include:    Age  Atrial Fibrillation  Carotid Artery Stenosis  Diabetes Mellitus  Excessive alcohol consumption  High blood pressure  Overweight   Physical inactivity  Smoking    Warning signs and symptoms of a stroke include:    *Sudden numbness or weakness of the face, arm or leg (especially on one side of the body).  *Sudden confusion, trouble speaking or understanding.  *Sudden trouble seeing in one or both eyes.  *Sudden trouble walking, dizziness, loss of balance or coordination.Sudden severe headache with no known cause.    It is very important to get treatment quickly when a stroke occurs. If you experience any of the above warning signs, call 911 immediately.                   Disclaimer         Quit Smoking / Tobacco Use:    I understand the use of any tobacco products increases my chance of suffering from future heart disease or stroke and could cause other illnesses which may shorten my life. Quitting the use of tobacco products is the single most important thing I can do to improve my health. For further information on smoking / tobacco cessation call a Toll Free Quit Line at 1-702.896.2582 (*[employer] [place]) or 1-841.797.5200 (American Lung Association) or you can access the web based program at www.lungusa.org.    [place] Tobacco Users Help Line:  (412)  879-1826       Toll Free: 0-856-896-8316  Quit Tobacco Program [facility] (667)424-3065    Crisis Hotline:    [place] Crisis Hotline:  9-825-VMXAPOF or 1-239.269.5864    [place] Crisis Hotline:    1-693.938.1683 or 894-394-4374    Discharge Survey:   Thank you for choosing [facility]. We hope we did everything we could to make your hospital stay a pleasant one. You may be receiving a phone survey and we would appreciate your time and participation in answering the questions. Your input is very valuable to us in our efforts to improve our service to our patients and their families.        My signature on this form indicates that:    1. I have reviewed and understand the above information.  2. My questions regarding this information have been answered to my satisfaction.  3. I have formulated a plan with my discharge nurse to obtain my prescribed medications for home.                  Disclaimer         __________________________________                     __________       ________                       Patient Signature                                                 Date                    Time

## 2017-04-03 NOTE — IP AVS SNAPSHOT
" <p align=\"LEFT\"><IMG SRC=\"//[email or web address]$/[email or web address]\" alt=\"Image\" WIDTH=\"50%\" HEIGHT=\"200\" BORDER=\"\"></p>                   Name:Morales Olivier  Medical Record Number:[id number]  CSN: [id number]    YOB: 1955   Age: 61 y.o.  Sex: male  HT:1.778 m (5' 10\") WT: 108.1 kg (238 lb 5.1 oz)          Admit Date: 4/3/2017     Discharge Date:   Today's Date: 4/4/2017  Attending Doctor:  [facility]                  Allergies:  Review of patient's allergies indicates no known allergies.          Your appointments     Apr 19, 2017 11:20 AM   FOLLOW UP with Sumanth Yancey M.D.   [facility] (--)    [address], [address]  [facility] [address] [phone]              Follow-up Information     1. Follow up with Nelia Huber M.D.. Schedule an appointment as soon as possible for a visit in 2 weeks.    Specialty:  Family Medicine    Why:  follow up appointment    Contact information    [address]  [address]  [facility] [id number] [phone]           Medication List      Take these Medications        Instructions    amiodarone 400 MG tablet   Commonly known as:  PACERONE    Take 1 Tab by mouth every day.   Dose:  400 mg       metformin 500 MG Tabs   Commonly known as:  GLUCOPHAGE    Take 1 Tab by mouth 2 times a day, with meals.   Dose:  500 mg       metoprolol 25 MG Tabs   Commonly known as:  LOPRESSOR    Take 1 Tab by mouth 2 Times a Day.   Dose:  25 mg       tamsulosin 0.4 MG capsule   Commonly known as:  FLOMAX    Take 0.4 mg by mouth ONE-HALF HOUR AFTER BREAKFAST.   Dose:  0.4 mg       * warfarin 5 MG Tabs   Commonly known as:  COUMADIN    Take 5 mg by mouth every Monday, Wednesday, Friday, and Saturday. Indications: Blood Clot in a Deep Vein, mitral valve replacement   Dose:  5 mg       * warfarin 2.5 MG Tabs   Commonly known as:  COUMADIN    Take 2.5 mg by mouth every Tuesday and Thursday. Indications: Blood Clot in a Deep Vein   Dose:  2.5 mg       * Notice:  This " list has 2 medication(s) that are the same as other medications prescribed for you. Read the directions carefully, and ask your doctor or other care provider to review them with you.

## 2017-04-03 NOTE — IP AVS SNAPSHOT
[employer] Access Code: Activation code not generated  Current [employer] Status: Patient Declined    Your email address is not on file at [facility] [employer].  Email Addresses are required for you to sign up for [employer], please contact [phone] to verify your personal information and to provide your email address prior to attempting to register for [employer].    Morales Olivier  [address]  [place] [address]    Reachoohart  A secure, online tool to manage your health information     [employer]’s [employer]® is a secure, online tool that connects you to your personalized health information from the privacy of your home -- day or night - making it very easy for you to manage your healthcare. Once the activation process is completed, you can even access your medical information using the [employer] moreno, which is available for free in the Apple Moreno store or Google Play store.     To learn more about [employer], visit [email or web address]    There are two levels of access available (as shown below):   My Chart Features  [facility] Primary Care Doctor [facility]  Specialists [facility]  Urgent  Care Non-[facility] Primary Care Doctor   Email your healthcare team securely and privately 24/7 X X X    Manage appointments: schedule your next appointment; view details of past/upcoming appointments X      Request prescription refills. X      View recent personal medical records, including lab and immunizations X X X X   View health record, including health history, allergies, medications X X X X   Read reports about your outpatient visits, procedures, consult and ER notes X X X X   See your discharge summary, which is a recap of your hospital and/or ER visit that includes your diagnosis, lab results, and care plan X X  X     How to register for [employer]:  Once your e-mail address has been verified, follow the following steps to sign up for [employer].     1. Go to  [email or web address]  2. Click on the Sign Up Now box, which takes you to the New Member Sign Up  page. You will need to provide the following information:  a. Enter your [employer]itus Access Code exactly as it appears at the top of this page. (You will not need to use this code after you’ve completed the sign-up process. If you do not sign up before the expiration date, you must request a new code.)   b. Enter your date of birth.   c. Enter your home email address.   d. Click Submit, and follow the next screen’s instructions.  3. Create a ubitus ID. This will be your ubitus login ID and cannot be changed, so think of one that is secure and easy to remember.  4. Create a [employer]itus password. You can change your password at any time.  5. Enter your Password Reset Question and Answer. This can be used at a later time if you forget your password.   6. Enter your e-mail address. This allows you to receive e-mail notifications when new information is available in [employer]itus.  7. Click Sign Up. You can now view your health information.    For assistance activating your ubitus account, call [phone]

## 2017-04-03 NOTE — IP AVS SNAPSHOT
4/4/2017          Morales Olivier  [address]  [place] [address]    Dear Morales:    [facility] wants to ensure your discharge home is safe and you or your loved ones have had all your questions answered regarding your care after you leave the hospital.    You may receive a telephone call within two days of your discharge.  This call is to make certain you understand your discharge instructions as well as ensure we provided you with the best care possible during your stay with us.     The call will only last approximately 3-5 minutes and will be done by a nurse.    Once again, we want to ensure your discharge home is safe and that you have a clear understanding of any next steps in your care.  If you have any questions or concerns, please do not hesitate to contact us, we are here for you.  Thank you for choosing [facility] for your healthcare needs.    Sincerely,    Maninder Reveles    [facility]

## 2017-04-04 ENCOUNTER — APPOINTMENT (OUTPATIENT)
Dept: RADIOLOGY | Facility: MEDICAL CENTER | Age: 62
End: 2017-04-04
Attending: ORTHOPAEDIC SURGERY
Payer: MEDICARE

## 2017-04-04 VITALS
WEIGHT: 238.32 LBS | OXYGEN SATURATION: 94 % | BODY MASS INDEX: 34.12 KG/M2 | HEIGHT: 70 IN | RESPIRATION RATE: 16 BRPM | TEMPERATURE: 97.8 F | DIASTOLIC BLOOD PRESSURE: 90 MMHG | SYSTOLIC BLOOD PRESSURE: 127 MMHG | HEART RATE: 79 BPM

## 2017-04-04 PROBLEM — R10.9 ABDOMINAL PAIN: Status: RESOLVED | Noted: 2017-04-03 | Resolved: 2017-04-04

## 2017-04-04 LAB
ALBUMIN SERPL BCP-MCNC: 3.5 G/DL (ref 3.2–4.9)
ALBUMIN/GLOB SERPL: 1.3 G/DL
ALP SERPL-CCNC: 81 U/L (ref 30–99)
ALT SERPL-CCNC: 8 U/L (ref 2–50)
ANION GAP SERPL CALC-SCNC: 11 MMOL/L (ref 0–11.9)
AST SERPL-CCNC: 16 U/L (ref 12–45)
BASOPHILS # BLD AUTO: 1 % (ref 0–1.8)
BASOPHILS # BLD: 0.08 K/UL (ref 0–0.12)
BILIRUB SERPL-MCNC: 0.5 MG/DL (ref 0.1–1.5)
BUN SERPL-MCNC: 18 MG/DL (ref 8–22)
CALCIUM SERPL-MCNC: 8.8 MG/DL (ref 8.5–10.5)
CHLORIDE SERPL-SCNC: 104 MMOL/L (ref 96–112)
CO2 SERPL-SCNC: 21 MMOL/L (ref 20–33)
CREAT SERPL-MCNC: 1.13 MG/DL (ref 0.5–1.4)
EOSINOPHIL # BLD AUTO: 0.76 K/UL (ref 0–0.51)
EOSINOPHIL NFR BLD: 9.8 % (ref 0–6.9)
ERYTHROCYTE [DISTWIDTH] IN BLOOD BY AUTOMATED COUNT: 45.4 FL (ref 35.9–50)
GFR SERPL CREATININE-BSD FRML MDRD: >60 ML/MIN/1.73 M 2
GLOBULIN SER CALC-MCNC: 2.6 G/DL (ref 1.9–3.5)
GLUCOSE BLD-MCNC: 71 MG/DL (ref 65–99)
GLUCOSE BLD-MCNC: 90 MG/DL (ref 65–99)
GLUCOSE BLD-MCNC: 99 MG/DL (ref 65–99)
GLUCOSE SERPL-MCNC: 81 MG/DL (ref 65–99)
HCT VFR BLD AUTO: 37.4 % (ref 42–52)
HGB BLD-MCNC: 12.7 G/DL (ref 14–18)
IMM GRANULOCYTES # BLD AUTO: 0.11 K/UL (ref 0–0.11)
IMM GRANULOCYTES NFR BLD AUTO: 1.4 % (ref 0–0.9)
LACTATE BLD-SCNC: 1.2 MMOL/L (ref 0.5–2)
LYMPHOCYTES # BLD AUTO: 1 K/UL (ref 1–4.8)
LYMPHOCYTES NFR BLD: 12.9 % (ref 22–41)
MCH RBC QN AUTO: 31.8 PG (ref 27–33)
MCHC RBC AUTO-ENTMCNC: 34 G/DL (ref 33.7–35.3)
MCV RBC AUTO: 93.5 FL (ref 81.4–97.8)
MONOCYTES # BLD AUTO: 0.7 K/UL (ref 0–0.85)
MONOCYTES NFR BLD AUTO: 9.1 % (ref 0–13.4)
NEUTROPHILS # BLD AUTO: 5.08 K/UL (ref 1.82–7.42)
NEUTROPHILS NFR BLD: 65.8 % (ref 44–72)
NRBC # BLD AUTO: 0 K/UL
NRBC BLD AUTO-RTO: 0 /100 WBC
PLATELET # BLD AUTO: 140 K/UL (ref 164–446)
PMV BLD AUTO: 10.3 FL (ref 9–12.9)
POTASSIUM SERPL-SCNC: 4.1 MMOL/L (ref 3.6–5.5)
PROT SERPL-MCNC: 6.1 G/DL (ref 6–8.2)
RBC # BLD AUTO: 4 M/UL (ref 4.7–6.1)
SODIUM SERPL-SCNC: 136 MMOL/L (ref 135–145)
WBC # BLD AUTO: 7.7 K/UL (ref 4.8–10.8)

## 2017-04-04 PROCEDURE — A9270 NON-COVERED ITEM OR SERVICE: HCPCS

## 2017-04-04 PROCEDURE — 97165 OT EVAL LOW COMPLEX 30 MIN: CPT | Mod: XE

## 2017-04-04 PROCEDURE — G8979 MOBILITY GOAL STATUS: HCPCS | Mod: CI

## 2017-04-04 PROCEDURE — 700102 HCHG RX REV CODE 250 W/ 637 OVERRIDE(OP): Performed by: ORTHOPAEDIC SURGERY

## 2017-04-04 PROCEDURE — 36415 COLL VENOUS BLD VENIPUNCTURE: CPT

## 2017-04-04 PROCEDURE — G8988 SELF CARE GOAL STATUS: HCPCS | Mod: CI

## 2017-04-04 PROCEDURE — A9270 NON-COVERED ITEM OR SERVICE: HCPCS | Performed by: ORTHOPAEDIC SURGERY

## 2017-04-04 PROCEDURE — 80053 COMPREHEN METABOLIC PANEL: CPT

## 2017-04-04 PROCEDURE — G8978 MOBILITY CURRENT STATUS: HCPCS | Mod: CI

## 2017-04-04 PROCEDURE — G0378 HOSPITAL OBSERVATION PER HR: HCPCS

## 2017-04-04 PROCEDURE — 82962 GLUCOSE BLOOD TEST: CPT

## 2017-04-04 PROCEDURE — 78227 HEPATOBIL SYST IMAGE W/DRUG: CPT

## 2017-04-04 PROCEDURE — 700102 HCHG RX REV CODE 250 W/ 637 OVERRIDE(OP)

## 2017-04-04 PROCEDURE — G8987 SELF CARE CURRENT STATUS: HCPCS | Mod: CI

## 2017-04-04 PROCEDURE — 700105 HCHG RX REV CODE 258: Performed by: ORTHOPAEDIC SURGERY

## 2017-04-04 PROCEDURE — 83605 ASSAY OF LACTIC ACID: CPT

## 2017-04-04 PROCEDURE — 85025 COMPLETE CBC W/AUTO DIFF WBC: CPT

## 2017-04-04 PROCEDURE — 97162 PT EVAL MOD COMPLEX 30 MIN: CPT

## 2017-04-04 RX ORDER — SODIUM CHLORIDE 9 MG/ML
1000 INJECTION, SOLUTION INTRAVENOUS CONTINUOUS
Status: DISCONTINUED | OUTPATIENT
Start: 2017-04-04 | End: 2017-04-04 | Stop reason: HOSPADM

## 2017-04-04 RX ORDER — WARFARIN SODIUM 2.5 MG/1
2.5 TABLET ORAL
Status: DISCONTINUED | OUTPATIENT
Start: 2017-04-04 | End: 2017-04-04 | Stop reason: HOSPADM

## 2017-04-04 RX ORDER — WARFARIN SODIUM 5 MG/1
4-5 TABLET ORAL DAILY
COMMUNITY
End: 2019-08-29

## 2017-04-04 RX ORDER — SODIUM CHLORIDE 9 MG/ML
1000 INJECTION, SOLUTION INTRAVENOUS ONCE
Status: DISCONTINUED | OUTPATIENT
Start: 2017-04-04 | End: 2017-04-04 | Stop reason: HOSPADM

## 2017-04-04 RX ORDER — WARFARIN SODIUM 5 MG/1
5 TABLET ORAL
Status: DISCONTINUED | OUTPATIENT
Start: 2017-04-05 | End: 2017-04-04 | Stop reason: HOSPADM

## 2017-04-04 RX ORDER — WARFARIN SODIUM 2.5 MG/1
2.5 TABLET ORAL
COMMUNITY
End: 2018-02-24

## 2017-04-04 RX ADMIN — METFORMIN HYDROCHLORIDE 500 MG: 500 TABLET, FILM COATED ORAL at 17:24

## 2017-04-04 RX ADMIN — SODIUM CHLORIDE 1000 ML: 9 INJECTION, SOLUTION INTRAVENOUS at 03:25

## 2017-04-04 RX ADMIN — ACETAMINOPHEN 650 MG: 325 TABLET, FILM COATED ORAL at 11:39

## 2017-04-04 RX ADMIN — METOPROLOL TARTRATE 25 MG: 25 TABLET, FILM COATED ORAL at 10:19

## 2017-04-04 RX ADMIN — TAMSULOSIN HYDROCHLORIDE 0.4 MG: 0.4 CAPSULE ORAL at 10:18

## 2017-04-04 RX ADMIN — WARFARIN SODIUM 2.5 MG: 2.5 TABLET ORAL at 17:24

## 2017-04-04 RX ADMIN — AMIODARONE HYDROCHLORIDE 400 MG: 200 TABLET ORAL at 10:19

## 2017-04-04 ASSESSMENT — ENCOUNTER SYMPTOMS
HEARTBURN: 0
ABDOMINAL PAIN: 1
BACK PAIN: 1
SPEECH CHANGE: 0
CLAUDICATION: 0
COUGH: 1
PHOTOPHOBIA: 0
CHILLS: 1
FALLS: 0
PALPITATIONS: 0
DOUBLE VISION: 0
FLANK PAIN: 0
BLURRED VISION: 0
SENSORY CHANGE: 0
BRUISES/BLEEDS EASILY: 0
BLOOD IN STOOL: 0
TINGLING: 1
DIZZINESS: 1
HEADACHES: 1
NECK PAIN: 1
SEIZURES: 0
EYE PAIN: 0
STRIDOR: 0
ORTHOPNEA: 1
VOMITING: 0
LOSS OF CONSCIOUSNESS: 0
DIARRHEA: 0
CONSTIPATION: 0
SPUTUM PRODUCTION: 0
NAUSEA: 1
FEVER: 0
SORE THROAT: 0
TREMORS: 0
FOCAL WEAKNESS: 0
WHEEZING: 0
DIAPHORESIS: 0
WEIGHT LOSS: 1
SHORTNESS OF BREATH: 0
MYALGIAS: 1
HEMOPTYSIS: 0
WEAKNESS: 1

## 2017-04-04 ASSESSMENT — GAIT ASSESSMENTS
DEVIATION: BRADYKINETIC
ASSISTIVE DEVICE: SINGLE POINT CANE
GAIT LEVEL OF ASSIST: SUPERVISED
DISTANCE (FEET): 400

## 2017-04-04 ASSESSMENT — PAIN SCALES - GENERAL
PAINLEVEL_OUTOF10: 0
PAINLEVEL_OUTOF10: 4
PAINLEVEL_OUTOF10: 0
PAINLEVEL_OUTOF10: 3
PAINLEVEL_OUTOF10: 0

## 2017-04-04 ASSESSMENT — PATIENT HEALTH QUESTIONNAIRE - PHQ9
1. LITTLE INTEREST OR PLEASURE IN DOING THINGS: NOT AT ALL
2. FEELING DOWN, DEPRESSED, IRRITABLE, OR HOPELESS: NOT AT ALL
SUM OF ALL RESPONSES TO PHQ9 QUESTIONS 1 AND 2: 0
SUM OF ALL RESPONSES TO PHQ QUESTIONS 1-9: 0

## 2017-04-04 ASSESSMENT — LIFESTYLE VARIABLES
DO YOU DRINK ALCOHOL: NO
DO YOU DRINK ALCOHOL: NO
EVER_SMOKED: YES
EVER_SMOKED: YES
ALCOHOL_USE: NO

## 2017-04-04 ASSESSMENT — ACTIVITIES OF DAILY LIVING (ADL): TOILETING: INDEPENDENT

## 2017-04-04 NOTE — CARE PLAN
Problem: Safety  Goal: Will remain free from injury  Intervention: Provide assistance with mobility  BED ALARM SET. INSTRUCTED ON NEED TO CALL FOR ASSIST.

## 2017-04-04 NOTE — PROGRESS NOTES
Received bedside report from OSWALD Connor. Pt A/OX4. No c/o pain or discomfort at the moment. Call bell within patient reach, bed alarm on. Will continue with plan of care.

## 2017-04-04 NOTE — PROGRESS NOTES
Inpatient Anticoagulation Service Note    Date: 2017  Reason for Anticoagulation: Atrial Fibrillation, Bioprosthetic Valve Replacement, Deep Vein Thrombosis        Hemoglobin Value: 13.1  Hematocrit Value: 39  Lab Platelet Value: 140  Target INR: 2.0 to 3.0 (Bioprosthetic valve: Target INR of 2.5 (range, 2 to 3) for t)    INR from last 7 days     Date/Time INR Value    17 1749 (!)2.61        Dose from last 7 days     None        Significant Interactions: Amiodarone  Bridge Therapy: No (If less than 5 days and overlap therapy discontinued -- document reason (i.e. Bleed Risk))    (If still on overlap therapy, if No -- document reason (i.e. Bleed Risk))    Comments: PMH significant for CAD, HTN, AFib, Artificial Mitral Valve x2 on OAC, pacemaker, DM2, hx of DVT who presents to ED from clinic for general malaise and non-specific symptoms. He states he just hasn't felt right since he had valve replacement surgery 3/8/17.  Patient states Coumadin regimen is 2.5 mg on Tue and Thur, 5 mg all other days, last dose was 17.  INR is therapeutic upon admit.  per RN no signs of bleeding or bruising.  No new DDI.    Plan:  Continue home regimen.  Check INR daily.  Clinical pharmacist to follow and adjust dosing as needed.    Education Material Provided?: No  Pharmacist suggested discharge dosin.5 mg Tues/Thur, 5 mg AOD     Ana Alfaro [facility]

## 2017-04-04 NOTE — CARE PLAN
Problem: Safety  Goal: Will remain free from falls  Outcome: PROGRESSING AS EXPECTED  Patient cooperative with using call bell light before getting up. Pt gait steady. A/OX4.     Problem: Knowledge Deficit  Goal: Knowledge of disease process/condition, treatment plan, diagnostic tests, and medications will improve  Outcome: PROGRESSING AS EXPECTED  Educated patient about disease process/condition, treatment plans and diagnostic tests. Patient states understanding to all.

## 2017-04-04 NOTE — DISCHARGE PLANNING
Medical Social Work    Update: CNA waiting for available portable oxygen tank to complete study.

## 2017-04-04 NOTE — DISCHARGE PLANNING
Medical Social Work    Update: PT/OT recommending HH. LSW to request HH order.     Pt may need home O2. Pt sat'd down to 78% while ambulating. Awaiting home O2 study.     Expected d/c is in a few days.

## 2017-04-04 NOTE — THERAPY
"Occupational Therapy Evaluation completed.   Functional Status:  Pt seen today for OT evaluation. Pt cooperative, a bit agitated due to NPO status. When asked how things were going at home, pt reports his friend helps him with grocery shopping/IADLs and he has been able to do his stairs OK. When asked why he came in pt reports \"I just don't feel right\" but unable to elaborate. Pt requires SBA for functional mobility/txfs with no AD. Jen to don pants and socks. Pt limited by fatigue and weakness which impacts independence in ADLs and functional mobility.  Plan of Care: Will benefit from Occupational Therapy 2 times per week, will not actively follow but will see patient one more time at /physican request to assist with d/c needs.  Discharge Recommendations:  Equipment: Will Continue to Assess for Equipment Needs. Post-acute therapy Discharge to home with outpatient or home health for additional skilled therapy services.    See \"Rehab Therapy-Acute\" Patient Summary Report for complete documentation.    "

## 2017-04-04 NOTE — H&P
Inpatient Anticoagulation Service Note    Date: 4/4/2017  Reason for Anticoagulation: Atrial Fibrillation, Deep Vein Thrombosis, Mechanical Mitral Valve Replacement          Hemoglobin Value: 12.7  Hematocrit Value: 37.4  Lab Platelet Value: 140  Target INR: 2.5 to 3.5    INR from last 7 days     Date/Time INR Value    04/03/17 1749 (!)2.61        Dose from last 7 days     Date/Time Dose (mg)    04/04/17 1300 2.5        Significant Interactions: Amiodarone  Bridge Therapy: No     Comments/Plan: INR upon admission was therapeutic at 2.61, therefore, home warfarin regimen was initiated. INR goal for patient is 2.5 - 3.5 due to mechanical mitral valve replacement. Patient will receive warfarin 2.5mg po this evening with repeat INR tomorrow x 3 days. H/H is stable. Pharmacy will continue to monitor daily.     Education Material Provided?: No (Chronic Anticoagulation patient)     Pharmacist suggested discharge dosing: Warfarin 2.5mg po on Tue, Thurs and Warfarin 5mg po on Mon, Wed, Fri, Sat, Sun      Celia White, PHARMD

## 2017-04-04 NOTE — H&P
FAMILY MEDICINE HISTORY AND PHYSICAL    Primary Care Physician: Nelia Huber M.D.     Patient ID:  Name:             Morales Olivier   YOB: 1955  Age:                 61 y.o.  male   MRN:               [id number]    Attending Physician:  Dr. Dickson   Senior Resident:  Dr. Santo    CHIEF COMPLAINT  had concerns including Chest Pain; Nausea; and Malaise.    HPI  Morales Olivier is a 61 y.o. Male with PMH significant for CAD, HTN, AFib, Artificial Mitral Valve x2 on OAC, pacemaker, DM2, hx of DVT who presents to ED from clinic for general malaise and non-specific symptoms. He states he just hasn't felt right since he had valve replacement surgery 3/8/17. Over the last 3 weeks he endorses nausea with dry heaving but no vomiting, weakness, dizziness, malaise, cough, dry mouth, waxing and waning pain (back, side, neck, abdomen, arms, feet), headache, chills, lack of appetite. He was discharged from [facility] on Friday.    Review of Systems   Constitutional: Positive for chills, weight loss and malaise/fatigue. Negative for fever and diaphoresis.        Rare chills. Has lost 30 lbs in 1 month.   HENT: Negative for congestion, ear pain, hearing loss and sore throat.         +Dry throat (AM worse)   Eyes: Negative for blurred vision, double vision, photophobia and pain.        +Floaters   Respiratory: Positive for cough. Negative for hemoptysis, sputum production, shortness of breath, wheezing and stridor.    Cardiovascular: Positive for orthopnea and leg swelling. Negative for chest pain, palpitations and claudication.   Gastrointestinal: Positive for nausea and abdominal pain. Negative for heartburn, vomiting, diarrhea, constipation, blood in stool and melena.   Genitourinary: Negative for dysuria, urgency, frequency, hematuria and flank pain.   Musculoskeletal: Positive for myalgias, back pain, joint pain and neck pain. Negative for falls.        Waxes and wanes, responds to pills   Skin: Negative for  rash.   Neurological: Positive for dizziness, tingling, weakness and headaches. Negative for tremors, sensory change, speech change, focal weakness, seizures and loss of consciousness.        Tingling in fingers b/l occasionally   Endo/Heme/Allergies: Does not bruise/bleed easily.         PAST MEDICAL HISTORY   has a past medical history of Gout; CAD (coronary artery disease); Hypertension; Atrial fibrillation (CMS-HCC); Pacemaker; Heart valve disease; Renal disorder; Bronchitis; Type II or unspecified type diabetes mellitus without mention of complication, not stated as uncontrolled; and Personal history of venous thrombosis and embolism (2009).  Past Medical History   Diagnosis Date   • Gout    • CAD (coronary artery disease)    • Hypertension    • Atrial fibrillation (CMS-HCC)    • Pacemaker    • Heart valve disease      mitral valve replacement (bovine)   • Renal disorder      kidney stones   • Bronchitis    • Type II or unspecified type diabetes mellitus without mention of complication, not stated as uncontrolled      DM type II- diet controlled   • Personal history of venous thrombosis and embolism 2009     DVT right leg       SURGICAL HISTORY   has past surgical history that includes mitral valve replace (3/14/08); maze procedure (3/14/08); angiogram (7/3/2009); angiogram (7/4/2009); cath removal (7/4/2009); thrombectomy (7/4/2009); embolectomy (7/4/2009); irrigation & debridement general (7/20/2009); wide excision (7/20/2009); other cardiac surgery (2008); other abdominal surgery; mitral valve replace (3/8/2017); and lopez (3/8/2017).  Past Surgical History   Procedure Laterality Date   • Mitral valve replace  3/14/08     Performed by JEANA MARTELL at SURGERY [facility]   • Maze procedure  3/14/08     Performed by JEANA MARTELL at [facility] ORS   • Angiogram  7/3/2009     Performed by MORGAN WARD at SURGERY [facility]   • Angiogram  7/4/2009     Performed by MICHEL URBINA at  SURGERY [facility] ORS   • Cath removal  7/4/2009     Performed by MICHEL URBINA at [facility]   • Thrombectomy  7/4/2009     Performed by MICHEL URBINA at SURGERY [facility]   • Embolectomy  7/4/2009     Performed by MICHEL URBINA at SURGERY [facility]   • Irrigation & debridement general  7/20/2009     Performed by MICHEL URBINA at SURGERY [facility]   • Wide excision  7/20/2009     Performed by MICHEL URBINA at SURGERY [facility]   • Other cardiac surgery  2008     mitral valve replacement   • Other abdominal surgery       imbulica repair    • Mitral valve replace  3/8/2017     Procedure: MITRAL VALVE REPLACE-REDO;  Surgeon: Cornelia Wright M.D.;  Location: SURG[place] [facility];  Service:    • Logan  3/8/2017     Procedure: LOGAN;  Surgeon: Cornelia Wright M.D.;  Location: [facility] [facility];  Service:        CURRENT MEDICATIONS  Home Medications     Reviewed by Richie Blum (Pharmacy Tech) on 04/03/17 at 2308  Med List Status: Complete    Medication Last Dose Status    amiodarone (PACERONE) 400 MG tablet 4/3/2017 Active    metformin (GLUCOPHAGE) 500 MG Tab 4/3/2017 Active    metoprolol (LOPRESSOR) 25 MG TABS 4/3/2017 Active    tamsulosin (FLOMAX) 0.4 MG capsule 4/3/2017 Active              Patient's Medications   New Prescriptions    No medications on file   Previous Medications    AMIODARONE (PACERONE) 400 MG TABLET    Take 1 Tab by mouth every day.    METFORMIN (GLUCOPHAGE) 500 MG TAB    Take 1 Tab by mouth 2 times a day, with meals.    METOPROLOL (LOPRESSOR) 25 MG TABS    Take 1 Tab by mouth 2 Times a Day.    TAMSULOSIN (FLOMAX) 0.4 MG CAPSULE    Take 0.4 mg by mouth ONE-HALF HOUR AFTER BREAKFAST.   Modified Medications    No medications on file   Discontinued Medications    ALBUTEROL 108 (90 BASE) MCG/ACT AERO SOLN INHALATION AEROSOL    Inhale 2 Puffs by mouth every 6 hours as needed for Shortness of Breath.    ASPIRIN EC 81 MG EC TABLET     Take 1 Tab by mouth every day.    BENZONATATE (TESSALON) 100 MG CAP    Take 100 mg by mouth 3 times a day as needed for Cough.    BISACODYL (DULCOLAX) 10 MG SUPPOS    Insert 10 mg in rectum Once.    FUROSEMIDE (LASIX) 40 MG TAB    Take 1 Tab by mouth every day.    GUAIFENESIN DM SUGAR FREE (DIABETIC TUSSIN DM)  MG/5ML LIQUID SOLN    Take 10 mL by mouth every 6 hours as needed for Cough.    HYDROCODONE-ACETAMINOPHEN (NORCO) 5-325 MG TAB PER TABLET    Take 1-2 Tabs by mouth every 6 hours as needed.    INSULIN GLARGINE (LANTUS) 100 UNIT/ML SOLUTION    Inject 10 Units as instructed 2 times a day.    INSULIN LISPRO (HUMALOG) 100 UNIT/ML SOLUTION    Inject 0-10 Units as instructed 3 times a day before meals. Sliding Scale:  151-200 = 2 units  201-250 = 4 units  251-300 = 6 units  301-350 = 8 units  351-400 = 10 units    LACTULOSE 10 GM/15ML SOLUTION    Take 20 g by mouth 3 times a day.    MELATONIN 3 MG TAB    Take 3 mg by mouth every bedtime.    ONDANSETRON (ZOFRAN ODT) 4 MG TABLET DISPERSIBLE    Take 4 mg by mouth every 6 hours as needed for Nausea/Vomiting.    PHYTONADIONE (AQUA-MEPHYTON) 1 MG/0.5ML SOLUTION    5 mg by Intramuscular route Once.    POLYETHYLENE GLYCOL/LYTES (MIRALAX) PACK    Take 17 g by mouth every day.    POTASSIUM CHLORIDE SA (KDUR) 20 MEQ TAB CR    Take 1 Tab by mouth every day.    TEMAZEPAM (RESTORIL) 15 MG CAP    Take 15 mg by mouth at bedtime as needed for Sleep.    WARFARIN (COUMADIN) 7.5 MG TAB    Take 1 Tab by mouth every day. Titrate to INR 2-3.       ALLERGIES  No Known Allergies    SOCIAL HISTORY   reports that he quit smoking about 36 years ago. He has never used smokeless tobacco. He reports that he does not drink alcohol or use illicit drugs.  Social History     Social History Main Topics   • Smoking status: Former Smoker     Quit date: 01/01/1981   • Smokeless tobacco: Never Used   • Alcohol Use: No   • Drug Use: No   • Sexual Activity: Not on file       FAMILY HISTORY  History  "reviewed. No pertinent family history.      PHYSICAL EXAM  VITAL SIGNS: /76 mmHg  Pulse 82  Temp(Src) 36.9 °C (98.4 °F)  Resp 20  Ht 1.778 m (5' 10\")  Wt 108.41 kg (239 lb)  BMI 34.29 kg/m2  SpO2 97%   Oxygen Therapy:  Pulse Oximetry: 97 %, O2 Delivery: None (Room Air)  Filed Vitals:    04/03/17 2230 04/03/17 2301 04/03/17 2330 04/04/17 0000   BP:       Pulse: 81 80 84 82   Temp:       Resp: 12 18 15 20   Height:       Weight:       SpO2: 94% 94% 90% 97%       Constitutional:   WDWN, NAD, Non-toxic, Overweight  HEENT:  NC/AT, dry mucus membranes, no PND, poor oral hygeine  Eyes:  PERRL, EOMI, Conjunctiva normal, sclera anicteric  Neck:  Supple s lymphadenopathy, nl thyroid  Cardiovascular:  RRR s m/g/r  Lungs:  CTAB, no wheezes, rales, or rhonchi. Effort is normal.   Back: No CVA or spinal tenderness  Abdomen: +BS, soft, severe tender to deep palpation in RUQ and LLQ, +hardeep sign, no rebound or guarding  Skin: Warm, Dry, No erythema, No evident rash. Well healing sternal incisions with no erythema, warmth, or discharge  Extremities: BIANCHI, no CCE  Neurologic: AOx 4, Normal motor function, Normal sensory function, No focal deficits noted.  Psychiatric: Affect normal, Judgment normal.     Lab Data Review:  Lab Results   Component Value Date    WBC 8.2 04/03/2017    HEMOGLOBIN 13.1* 04/03/2017    HEMATOCRIT 39.0* 04/03/2017    PLATELETCT 140* 04/03/2017    MCV 91.5 04/03/2017     Lab Results   Component Value Date    SODIUM 136 04/03/2017    POTASSIUM 4.4 04/03/2017    CHLORIDE 103 04/03/2017    CO2 24 04/03/2017    BUN 18 04/03/2017    CREATININE 1.12 04/03/2017    GLUCOSE 92 04/03/2017     Lab Results   Component Value Date    PROTHROMBTM 28.7* 04/03/2017    INR 2.61* 04/03/2017      Lab Results   Component Value Date    CHOLSTRLTOT 167 12/06/2016    * 12/06/2016    HDL 24* 12/06/2016    TRIGLYCERIDE 189* 12/06/2016      Lab Results   Component Value Date    HBA1C 5.9* 03/07/2017    "     Imaging/Procedures Review:    US-GALLBLADDER   Final Result         1.  Gallbladder wall thickness with gallbladder sludge but without visualized stones, appearance concerning for acalculous cholecystitis.      DX-CHEST-LIMITED (1 VIEW)   Final Result      1.  No acute cardiopulmonary disease.   2.  Stable cardiomegaly.   3.  Prior open heart surgery.      NM-BILIARY (HIDA) SCAN WITH CCK    (Results Pending)       Assessment and Plan:     Morales Olivier is a 61 y.o. male with nonspecific symptoms most consistent with post-operative recovery from mitral valve replacement surgery 3/8/17. Abdominal pain is consistent with gallbladder origin with imaging appearing to support diagnosis of acalculous cholecystitis. It also appears that since his discharge from SNF he has been having trouble taking care of self. We will admit patient to floor for observation and continue work up for gallbladder pathology. We will also set up for Home Health.    #Artificial Mitral Valve  S/p repair on 3/8/17 and previously in 2008.  Non-specific symptoms most likely related to post-op recovery vs acute abdominal process (see below)  CXR WNL  OAC with INR goal 2.5-3.5 currently therapeutic  -Continue warfarin dosed per pharmacy  -Cardiac monitor  -IVF    #Abdominal Pain  DDx: cholecystitis, cholangitis, constipation, diverticulitis, mesenteric ischemia, prostatitis, post-op pain  Exam: RUQ pain, +hoffmann's sign.  Abdominal US demonstrates gallbladder wall thickness and sludge without stones for possible acalculous cholecystitis.  Surgery, Dr. Hartley, consulted by ERP, to call if HIDA positive.  LFTs WNL  Lipase WNL,   -HIDA scan  -IVF  -AM labs  -Pain management    #CHF  No clinical signs of fluid overload and no dyspnea.  S/p AICD with pacer   ECHO: 3/18 Left ventricular ejection fraction is visually estimated to be 60%, improved from previous of 35%. With apical inferior septal hypokinesis.  -Monitor    #H/O Diabetes  mellitus  Hemoglobin A1C 5.9 on 3/7/17  -continue home metformin  -ISS  -hypoglycemia protocol    # h/o atrial fibrillation.     Currently ventricularly paced  -continued home amiodarone   -continue OAC    #h/o DVT  On warfarin as above, is s/p thrombectomy 2009.  Not acute concerns during hospitalization    #Hypertension,    Normotensive   -continue metoprolol 25 mg BID    #BPH  Continued on home Flomax.    Dispo: admit to telemetry  Code: Full  PCP: Cecile

## 2017-04-04 NOTE — PROGRESS NOTES
Patient ambulated about 500 ft distance and patient sustained 92-96% on room air without any c/o sob, pain or discomfort. Proceed with discharge as ordered.

## 2017-04-04 NOTE — PROGRESS NOTES
[facility] MEDICINE PROGRESS NOTE     Attending: Randolph    Resident: Susi    PATIENT: Morales Olivier; [id number]; 1955    ID:CHIEF COMPLAINT  had concerns including Chest Pain; Nausea; and Malaise.    HPI  Morales Olivier is a 61 y.o. Male with PMH significant for CAD, HTN, AFib, Artificial Mitral Valve x2 on OAC, pacemaker, DM2, hx of DVT who presents to ED from clinic for general malaise and non-specific symptoms. He states he just hasn't felt right since he had valve replacement surgery 3/8/17. Over the last 3 weeks he endorses nausea with dry heaving but no vomiting, weakness, dizziness, malaise, cough, dry mouth, waxing and waning pain (back, side, neck, abdomen, arms, feet), headache, chills, lack of appetite. He was discharged from [facility] on Friday.      SUBJECTIVE: No acute events overnight, Pt's abd pain has improved significantly. He feels hungry today. Pt had some desats while walking today but sats well on RA at rest. No chest pain. Medically cleared for D/C.    OBJECTIVE:     Filed Vitals:    04/03/17 2330 04/04/17 0000 04/04/17 0217 04/04/17 0400   BP:   146/97 140/92   Pulse: 84 82 80 79   Temp:   35.8 °C (96.5 °F) 36.2 °C (97.1 °F)   Resp: 15 20 20 18   Height:       Weight:   108.1 kg (238 lb 5.1 oz)    SpO2: 90% 97% 95% 96%       Intake/Output Summary (Last 24 hours) at 04/04/17 0657  Last data filed at 04/04/17 0500   Gross per 24 hour   Intake      0 ml   Output    350 ml   Net   -350 ml       PE:  Constitutional:   WDWN, NAD, Non-toxic, Overweight  HEENT:  NC/AT, no PND, poor oral hygeine  Eyes:  PERRL, EOMI, Conjunctiva normal, sclera anicteric  Neck:  Supple s lymphadenopathy, nl thyroid  Cardiovascular:  RRR  no m/g/r  Lungs:  CTAB, no wheezes, rales, or rhonchi. Effort is normal.   Back: No CVA or spinal tenderness  Abdomen: +BS, soft, no rebound or guarding, mild ttp of RUQ  Skin: Warm, Dry, No erythema, No evident rash. Well healing sternal incisions with no erythema, warmth, or  discharge  Extremities: BIANCHI, no CCE  Neurologic: AOx 4, Normal motor function, Normal sensory function, No focal deficits noted.  Psychiatric: Affect normal, Judgment normal.     LABS:  Recent Labs      04/03/17   1749   WBC  8.2   RBC  4.26*   HEMOGLOBIN  13.1*   HEMATOCRIT  39.0*   MCV  91.5   MCH  30.8   RDW  45.1   PLATELETCT  140*   MPV  9.2   NEUTSPOLYS  69.40   LYMPHOCYTES  11.20*   MONOCYTES  8.00   EOSINOPHILS  9.60*   BASOPHILS  0.70     Recent Labs      04/03/17   1749   SODIUM  136   POTASSIUM  4.4   CHLORIDE  103   CO2  24   BUN  18   CREATININE  1.12   CALCIUM  9.2   ALBUMIN  3.9     Estimated GFR/CRCL = Estimated Creatinine Clearance: 84.2 mL/min (by C-G formula based on Cr of 1.12).  Recent Labs      04/03/17 1749 04/04/17   0550   GLUCOSE  92   --    POCGLUCOSE   --   71     Recent Labs      04/03/17   1749   ASTSGOT  15   ALTSGPT  8   TBILIRUBIN  0.5   ALKPHOSPHAT  89   GLOBULIN  2.8   INR  2.61*     Recent Labs      04/03/17   1749   TROPONINI  0.02   BNPBTYPENAT  166*           Invalid input(s): [id number]  Recent Labs      04/03/17 1749   INR  2.61*   APTT  43.1*       MICROBIOLOGY: no new    IMAGING: HIDA  - grossly normal    MEDS:  Current Facility-Administered Medications   Medication Last Dose   • NS (BOLUS) infusion 1,000 mL Stopped at 04/04/17 0145   • NS infusion 1,000 mL 1,000 mL at 04/04/17 0325   • MD ALERT... warfarin (COUMADIN) per pharmacy protocol     • warfarin (COUMADIN) tablet 2.5 mg     • [START ON 4/5/2017] warfarin (COUMADIN) tablet 5 mg     • senna-docusate (PERICOLACE or SENOKOT S) 8.6-50 MG per tablet 2 Tab 2 Tab at 04/04/17 0000    And   • polyethylene glycol/lytes (MIRALAX) PACKET 1 Packet      And   • magnesium hydroxide (MILK OF MAGNESIA) suspension 30 mL      And   • bisacodyl (DULCOLAX) suppository 10 mg     • enalaprilat (VASOTEC) injection 1.25 mg     • ondansetron (ZOFRAN) syringe/vial injection 4 mg     • ondansetron (ZOFRAN ODT) dispertab 4 mg     •  promethazine (PHENERGAN) tablet 12.5-25 mg     • promethazine (PHENERGAN) suppository 12.5-25 mg     • prochlorperazine (COMPAZINE) injection 5-10 mg     • acetaminophen (TYLENOL) tablet 650 mg     • insulin lispro (HUMALOG) injection 1-6 Units Stopped at 04/04/17 0700   • glucose 4 g chewable tablet 16 g      And   • dextrose 50% (D50W) injection 25 mL     • amiodarone (CORDARONE) tablet 400 mg     • metformin (GLUCOPHAGE) tablet 500 mg     • metoprolol (LOPRESSOR) tablet 25 mg 25 mg at 04/04/17 0015   • tamsulosin (FLOMAX) capsule 0.4 mg         PROBLEM LIST:  No problems updated.    Morales Olivier is a 61 y.o. male with nonspecific symptoms most consistent with post-operative recovery from mitral valve replacement surgery 3/8/17.     #Artificial Mitral Valve  S/p repair on 3/8/17 and previously in 2008.  Non-specific symptoms most likely related to post-op recovery vs acute abdominal process (see below)  CXR WNL  OAC with INR goal 2.5-3.5 currently therapeutic  -Continue warfarin dosed per pharmacy  - will get home health and home O2 eval    #Abdominal Pain  - much improved this morning  - Abdominal US demonstrates gallbladder wall thickness and sludge without stones for possible acalculous cholecystitis.  - HIDA scan shows no abnormalities  - LFTs WNL  - Lipase WNL,    - IVF  - Pain management    #CHF  No clinical signs of fluid overload and no dyspnea.  S/p AICD with pacer    ECHO: 3/18 Left ventricular ejection fraction is visually estimated to be 60%, improved from previous of 35%. With apical inferior septal hypokinesis.  -Monitor    #H/O Diabetes mellitus  Hemoglobin A1C 5.9 on 3/7/17  -continue home metformin  -ISS  -hypoglycemia protocol    # h/o atrial fibrillation.     Currently ventricularly paced  -continued home amiodarone    -continue OAC    #h/o DVT  On warfarin as above, is s/p thrombectomy 2009.  Not acute concerns during hospitalization    #Hypertension,    - stable  - continue metoprolol 25 mg  BID    #BPH  Continued on home Flomax.    Discharge Planning:  - Will get home O2 eval as pt has had some desats while walking  - PT/OT recommends home health, will arrange  - will have INR checks with home health per     Dispo: D/C today if home health and O2 arranged  Code: Full  PCP: Cecile

## 2017-04-04 NOTE — RESPIRATORY CARE
COPD EDUCATION by COPD CLINICAL EDUCATOR  4/4/2017 at 6:46 AM by Britt Del Real     Patient reviewed by COPD education team. Patient does not qualify for COPD program.

## 2017-04-04 NOTE — ED NOTES
"PT BIB EMS per report pt was being seen at his PCP today and c/o CP, malaise, and nausea.  PCP sent patient to ER via ambulance.  Per pt he had a mitral valve replacement 3/8/17. PT reports he has been having cp since.   Chief Complaint   Patient presents with   • Chest Pain   • Nausea   • Malaise     Blood pressure 126/76, pulse 80, temperature 36.9 °C (98.4 °F), resp. rate 16, height 1.778 m (5' 10\"), weight 108.41 kg (239 lb).    "

## 2017-04-04 NOTE — DISCHARGE PLANNING
Medical Social Work    Update: LSW spoke with Dr. Goldman. Pt can d/c today after home O2 and HH is arranged. Updated bedside RN that home O2 study needs to be done.

## 2017-04-04 NOTE — ED NOTES
Med rec updated and complete.  Allergies reviewed.  Discussed current medications   And last doses taken.  Pt states he was recently  Discharged from [facility] and  Did not fill and  some of his medications  Do to cost.

## 2017-04-04 NOTE — ED PROVIDER NOTES
ER Provider Note     Scribed for Mckay Isaac, * by Jak Penn. 4/3/2017, 6:05 PM.    Primary Care Provider: Nelia Huber M.D.  Means of Arrival: Ambulance  History obtained from: Patient  History limited by: none     CHIEF COMPLAINT   Chief Complaint   Patient presents with   • Chest Pain   • Nausea   • Malaise       HPI   Morales Olivier is a 61 y.o. , with a history of mitral valve replacement, who presents to the emergency department complaining of chest pain, onset 4-5 hours ago today while at his primary care physician's office for a check-up visit. Patient confirms intermittent nausea and constant malaise and dizziness, onset 3 days ago. He notes a soreness to his throat exacerbated by coughing. He denies any difficulty swallowing. He also denies vomiting, but has not had an appetite. He is currently taking Coumadin.     Patient denies any abdominal pain or vomiting. No diarrhea. He does live by himself. He does have a friend who is checking up on himself. However, he is been out of the nursing home for the last 3 days. He states he was able to go to the clinic by bus. He did not get too tired there. He did not exacerbate his chest pain during the trip    REVIEW OF SYSTEMS   General: Malaise. No fever or chills.  Eyes: No eye discharge. No eye pain.  Ear nose throat:  Sore throat. No trouble swallowing. No runny nose or congestion.  Pulmonary: mild cough. No shortness of breath.  Cardiovascular:  Chest pain. No chest pressure.  GI: Nausea. No abdominal pain or vomiting.  : No dysuria or hematuria  Dermatologic: No rashes. No abrasions.  Neurologic: Dizziness. No weakness or numbness.  Psychiatric: No anxiety or stress.  All other systems reviewed and are negative C.     PAST MEDICAL HISTORY  Past Medical History   Diagnosis Date   • Gout    • CAD (coronary artery disease)    • Hypertension    • Atrial fibrillation (CMS-HCC)    • Pacemaker    • Heart valve disease      mitral valve  replacement (bovine)   • Renal disorder      kidney stones   • Bronchitis    • Type II or unspecified type diabetes mellitus without mention of complication, not stated as uncontrolled      DM type II- diet controlled   • Personal history of venous thrombosis and embolism 2009     DVT right leg       SURGICAL HISTORY  Past Surgical History   Procedure Laterality Date   • Mitral valve replace  3/14/08     Performed by CORNELIA MARTELL at [facility]ERY [facility]   • Maze procedure  3/14/08     Performed by CORNELIA MARTELL at [facility]   • Angiogram  7/3/2009     Performed by MORGAN WARD at SUR[facility]ERY [facility]   • Angiogram  7/4/2009     Performed by MICHEL URBINA at [facility]   • Cath removal  7/4/2009     Performed by MICHEL URBINA at [facility]   • Thrombectomy  7/4/2009     Performed by MICHEL URBINA at [facility]   • Embolectomy  7/4/2009     Performed by MICHEL URBINA at SUR[facility]ERY [facility]   • Irrigation & debridement general  7/20/2009     Performed by MICHEL URBINA at [facility]   • Wide excision  7/20/2009     Performed by MICHEL URBINA at SUR[facility]ERY [facility]   • Other cardiac surgery  2008     mitral valve replacement   • Other abdominal surgery       imbulica repair    • Mitral valve replace  3/8/2017     Procedure: MITRAL VALVE REPLACE-REDO;  Surgeon: Cornelia Martell M.D.;  Location: [facility];  Service:    • Logan  3/8/2017     Procedure: LOGAN;  Surgeon: Cornelia Martell M.D.;  Location: [facility];  Service:        SOCIAL HISTORY  Social History   Substance Use Topics   • Smoking status: Former Smoker     Quit date: 01/01/1981   • Smokeless tobacco: Never Used   • Alcohol Use: No       CURRENT MEDICATIONS  Previous Medications    ALBUTEROL 108 (90 BASE) MCG/ACT AERO SOLN INHALATION AEROSOL    Inhale 2 Puffs by mouth every 6 hours as needed for Shortness of Breath.     AMIODARONE (PACERONE) 400 MG TABLET    Take 1 Tab by mouth every day.    ASPIRIN EC 81 MG EC TABLET    Take 1 Tab by mouth every day.    BENZONATATE (TESSALON) 100 MG CAP    Take 100 mg by mouth 3 times a day as needed for Cough.    BISACODYL (DULCOLAX) 10 MG SUPPOS    Insert 10 mg in rectum Once.    FUROSEMIDE (LASIX) 40 MG TAB    Take 1 Tab by mouth every day.    GUAIFENESIN DM SUGAR FREE (DIABETIC TUSSIN DM)  MG/5ML LIQUID SOLN    Take 10 mL by mouth every 6 hours as needed for Cough.    HYDROCODONE-ACETAMINOPHEN (NORCO) 5-325 MG TAB PER TABLET    Take 1-2 Tabs by mouth every 6 hours as needed.    INSULIN GLARGINE (LANTUS) 100 UNIT/ML SOLUTION    Inject 10 Units as instructed 2 times a day.    INSULIN LISPRO (HUMALOG) 100 UNIT/ML SOLUTION    Inject 0-10 Units as instructed 3 times a day before meals. Sliding Scale:  151-200 = 2 units  201-250 = 4 units  251-300 = 6 units  301-350 = 8 units  351-400 = 10 units    LACTULOSE 10 GM/15ML SOLUTION    Take 20 g by mouth 3 times a day.    MELATONIN 3 MG TAB    Take 3 mg by mouth every bedtime.    METFORMIN (GLUCOPHAGE) 500 MG TAB    Take 1 Tab by mouth 2 times a day, with meals.    METOPROLOL (LOPRESSOR) 25 MG TABS    Take 1 Tab by mouth 2 Times a Day.    ONDANSETRON (ZOFRAN ODT) 4 MG TABLET DISPERSIBLE    Take 4 mg by mouth every 6 hours as needed for Nausea/Vomiting.    PHYTONADIONE (AQUA-MEPHYTON) 1 MG/0.5ML SOLUTION    5 mg by Intramuscular route Once.    POLYETHYLENE GLYCOL/LYTES (MIRALAX) PACK    Take 17 g by mouth every day.    POTASSIUM CHLORIDE SA (KDUR) 20 MEQ TAB CR    Take 1 Tab by mouth every day.    TAMSULOSIN (FLOMAX) 0.4 MG CAPSULE    Take 0.4 mg by mouth ONE-HALF HOUR AFTER BREAKFAST.    TEMAZEPAM (RESTORIL) 15 MG CAP    Take 15 mg by mouth at bedtime as needed for Sleep.    WARFARIN (COUMADIN) 7.5 MG TAB    Take 1 Tab by mouth every day. Titrate to INR 2-3.       ALLERGIES   No Known Allergies    PHYSICAL EXAM   Vital Signs: /76 mmHg  Pulse  "80  Temp(Src) 36.9 °C (98.4 °F)  Resp 16  Ht 1.778 m (5' 10\")  Wt 108.41 kg (239 lb)  BMI 34.29 kg/m2  Constitutional: Well developed, Well nourished, No acute distress, Non-toxic appearance.   Psychiatric: Calm. Not anxious.  HENT:  Oropharynx: no exudate no erythema  Eyes: PERRLA, EOMI, Conjunctiva normal, No discharge.   Musculoskeletal: Neck is soft and supple no meningismus  Lymphatic: No cervical lymphadenopathy noted.   Cardiovascular: Sternotomy scar dry and intact with no significant erythema. Normal heart rate, Normal rhythm, No murmurs, Negative Homans, no pedal edema, good equal pedal pulses.  Pulmonary: Lungs are clear to auscultation bilaterally. No wheezes rales or rhonchi  Abdomen: All four quadrants tender to deep palpation. Bowel sounds normal, soft nondistended. No rebound and no guarding .  Skin: Warm, Dry, No erythema, No rash.   : No CVA tenderness.   Neurologic: Alert & oriented, moves all extremities normally. Good sensation to light touch on all extremities.     DIAGNOSTIC STUDIES/PROCEDURES  Labs:   Results for orders placed or performed during the hospital encounter of 04/03/17   Troponin   Result Value Ref Range    Troponin I 0.02 0.00 - 0.04 ng/mL   Btype Natriuretic Peptide   Result Value Ref Range    B Natriuretic Peptide 166 (H) 0 - 100 pg/mL   CBC with Differential   Result Value Ref Range    WBC 8.2 4.8 - 10.8 K/uL    RBC 4.26 (L) 4.70 - 6.10 M/uL    Hemoglobin 13.1 (L) 14.0 - 18.0 g/dL    Hematocrit 39.0 (L) 42.0 - 52.0 %    MCV 91.5 81.4 - 97.8 fL    MCH 30.8 27.0 - 33.0 pg    MCHC 33.6 (L) 33.7 - 35.3 g/dL    RDW 45.1 35.9 - 50.0 fL    Platelet Count 140 (L) 164 - 446 K/uL    MPV 9.2 9.0 - 12.9 fL    Neutrophils-Polys 69.40 44.00 - 72.00 %    Lymphocytes 11.20 (L) 22.00 - 41.00 %    Monocytes 8.00 0.00 - 13.40 %    Eosinophils 9.60 (H) 0.00 - 6.90 %    Basophils 0.70 0.00 - 1.80 %    Immature Granulocytes 1.10 (H) 0.00 - 0.90 %    Nucleated RBC 0.00 /100 WBC    Neutrophils " (Absolute) 5.71 1.82 - 7.42 K/uL    Lymphs (Absolute) 0.92 (L) 1.00 - 4.80 K/uL    Monos (Absolute) 0.66 0.00 - 0.85 K/uL    Eos (Absolute) 0.79 (H) 0.00 - 0.51 K/uL    Baso (Absolute) 0.06 0.00 - 0.12 K/uL    Immature Granulocytes (abs) 0.09 0.00 - 0.11 K/uL    NRBC (Absolute) 0.00 K/uL   Complete Metabolic Panel (CMP)   Result Value Ref Range    Sodium 136 135 - 145 mmol/L    Potassium 4.4 3.6 - 5.5 mmol/L    Chloride 103 96 - 112 mmol/L    Co2 24 20 - 33 mmol/L    Anion Gap 9.0 0.0 - 11.9    Glucose 92 65 - 99 mg/dL    Bun 18 8 - 22 mg/dL    Creatinine 1.12 0.50 - 1.40 mg/dL    Calcium 9.2 8.5 - 10.5 mg/dL    AST(SGOT) 15 12 - 45 U/L    ALT(SGPT) 8 2 - 50 U/L    Alkaline Phosphatase 89 30 - 99 U/L    Total Bilirubin 0.5 0.1 - 1.5 mg/dL    Albumin 3.9 3.2 - 4.9 g/dL    Total Protein 6.7 6.0 - 8.2 g/dL    Globulin 2.8 1.9 - 3.5 g/dL    A-G Ratio 1.4 g/dL   Prothrombin Time   Result Value Ref Range    PT 28.7 (H) 12.0 - 14.6 sec    INR 2.61 (H) 0.87 - 1.13   APTT   Result Value Ref Range    APTT 43.1 (H) 24.7 - 36.0 sec   Lipase   Result Value Ref Range    Lipase 21 11 - 82 U/L   ESTIMATED GFR   Result Value Ref Range    GFR If African American >60 >60 mL/min/1.73 m 2    GFR If Non African American >60 >60 mL/min/1.73 m 2   EKG (ER)   Result Value Ref Range    Report       [facility]    Test Date:  2017  Pt Name:    AILYN MCINTOSH                 Department: ER  MRN:        [id number]                      Room:        18  Gender:     M                            Technician: [id number]  :        1955                   Requested By:ER TRIAGE PROTOCOL  Order #:    [id number]                    Reading MD:    Measurements  Intervals                                Axis  Rate:       80                           P:          0  NY:         140                          QRS:        -36  QRSD:       156                          T:          127  QT:         444  QTc:         513    Interpretive Statements  VENTRICULAR-PACED RHYTHM  NO FURTHER ANALYSIS ATTEMPTED DUE TO PACED RHYTHM  Compared to ECG 03/18/2017 12:08:52  No significant changes         All labs reviewed by me.    EKG Interpretation:  12- Lead EKG; interpreted by Dr. Mckay Isaac.  Ventricular paced rhythm with a rate of 80 bpm.   Normal axis.  Normal intervals.   No ST elevation or depression    No widening of QRS complex   Good R wave progression   No diagnostic Q waves.   No previous EKG for comparison.   Clinical Impression: NO further evaluation can be made    Radiology:   US-GALLBLADDER   Final Result         1.  Gallbladder wall thickness with gallbladder sludge but without visualized stones, appearance concerning for acalculous cholecystitis.      DX-CHEST-LIMITED (1 VIEW)   Final Result      1.  No acute cardiopulmonary disease.   2.  Stable cardiomegaly.   3.  Prior open heart surgery.        The radiologist's interpretation of all radiological studies have been reviewed by me.    COURSE & MEDICAL DECISION MAKING   Nursing notes, VS, [id number] reviewed in chart     Obtained and reviewed past medical records from previous admission. Patient is a poor historian per chart. Valve 3/8 admission 3/18 for chest pain. CT scan chest revealed dehiscence, with conservative treatment.     Differential Diagnoses: (include but are not limited to) longer than expected recovery from surgery. Rule out infection or metabolic abnormality. This patient is here with just nonspecific complaints. Because of his cardiac history troponin we done to rule out MI although he has no real symptoms right now and no chest pain and his chest pain last time appear to be musculoskeletal he has no signs of JVD or significant shortness of breath suggest pericardial effusion. In addition, his exam still has abdominal pain the right upper quadrant so we are considering that this could be also gallbladder disease.    6:05 PM - Patient was  evaluated; DX chest, US gallbladder, Troponin, BNP, CBC with differential, CMP, PTT, APTT, Lipase EKG ordered. The patient will be medicated with IV fluids for his symptoms. Discussed with the patient that his vital signs are healthy. Stressed the need to perform further lab work as well as an ultrasound of the gallbladder. Will also give patient fluids.     7:59 PM - spoke with . Cannot order home health care for patient at this hour.     9:50 PM Paged R Family Medicine.     9:54 PM - I discussed the patient's case and the above findings with [facility] who recommends calling the general surgeon.     9:56 PM Paged Dr. Hartley (General surgery).     10:03 PM - I discussed the patient's case and the above findings with Dr. Hartley (General surgery) who would like to be notified if the HID scan is positive.     Repeat abdominal exam had tenderness in the right upper quadrant. This point the patient had a normal chest x-ray is EKG was unremarkable and unchanged patient's troponin was unremarkable as well. Patient notified the nurse later the scene that he wanted more pain medicine but it turned out he never got pain medicine in the 1st place. He was resting comfortably when I saw him at 9:30.        Patient did have findings and repeated abdominal findings of right upper quadrant tenderness therefore ultrasound was continued and shows possible acalculous cholecystitis. I spoke neurosurgeon regarding need for consultation he reviewed the chart and felt that he did not need to be notified until the hydroscan was present. At this point, we'll admit to the medical team for further evaluation observation.    FINAL IMPRESSION   1. Right upper quadrant abdominal pain         Jak JARRETT (Trisha), am scribing for, and in the presence of, Mckay Isaac, *.    Electronically signed by: Jak Penn (Trisha), 4/3/2017    Mckay JARRETT, * personally performed the services  described in this documentation, as scribed by Jak Penn in my presence, and it is both accurate and complete.    The note accurately reflects work and decisions made by me.  Mckay Isaac  4/3/2017  11:01 PM

## 2017-04-04 NOTE — PROGRESS NOTES
POST SURGICAL SCAR MID CHEST FROM RECENT OPEN HEART SURGERY TO DO MITRAL VALVE REPLACEMENT. WOUND WELL APPROXIMATED AND SCABBED. OLD BRUISE NOTED ON INNER LFA. NO OTHER SKIN IMPAIRMENTS NOTED.

## 2017-04-04 NOTE — PROGRESS NOTES
"RECEIVED FROM ER FOR ADMISSION VIA STRETCHER. ALERT AND ORIENTED X3. ANXIOUS AND AGITATED.C/O BEING IN SEMI PRIVATE ROOM, STATES \" I WISHH HE WOULD QUIT COUGHING, I WANNA SLEEP\"  "

## 2017-04-04 NOTE — CARE PLAN
Problem: Infection  Goal: Will remain free from infection  MONITOR FOR SIGNS OF INFECTION. MONITOR VITAL SIGNS AND LABS.

## 2017-04-04 NOTE — THERAPY
"Physical Therapy Evaluation completed.   Bed Mobility:  Supine to Sit:  (Nt, up with OT )  Transfers: Sit to Stand: Supervised (with SPC)  Gait: Level Of Assist: Supervised with Single Point Cane       Plan of Care: Will benefit from Physical Therapy 2 times per week  Discharge Recommendations: Equipment: No Equipment Needed.   Pt presents with impaired activity tolerance and hypoxia associated with current medical condition. Pt is most limited by generalized nausea and complaints of pain. Was hypoxic to 78% on RA during ambulation, noted increaesed work of breathing but denies change in nausea. Appropriate BP response to change in position and increasing workload. Physcially, functional to d/c home when medically appropriate to do so with 1 more treatment for assessment of hemodynamic response to stairs. If medical work up negative, appears pt would benefit from extensive education on recovery from OHS and what is normal and what may be handled by a primary care physician as he is concerned about medication reconciliation and would rather focus on medication changes then behavioral changes (this is his 2nd admit since sx 3/8/17). would benefit from referral to outpt cardiac rehab at discharge.   See \"Rehab Therapy-Acute\" Patient Summary Report for complete documentation.     "

## 2017-04-05 NOTE — DISCHARGE PLANNING
Medical Social Work    This  provided cab voucher (#[id number]) for pt to d/c to his residence at [address] [place] [address].

## 2017-04-05 NOTE — DISCHARGE PLANNING
Medical Social Work    Update: W updated Dr. Goldman that  order is not in, only face to face. He stated he will enter this soon.

## 2017-04-05 NOTE — FACE TO FACE
Face to Face Supporting Documentation - Home Health    The encounter with this patient was in whole or in part the primary reason for home health admission.    Date of encounter:   Patient:                    MRN:                       YOB: 2018  Morales Olivier  [id number]  1955     Home health to see patient for:  Skilled Nursing care for assessment, interventions & education and Comment: Physical therapy eval and treatment as well as INR checks    Skilled need for:  Surgical Aftercare mitral valve replacement    Skilled nursing interventions to include:  Comment: INR checks    Homebound status evidenced by:  Need the aid of supportive devices such as crutches, canes, wheelchairs or walkers. Leaving home requires a considerable and taxing effort. There is a normal inability to leave the home.    Community Physician to provide follow up care: Nelia Huber M.D.     Optional Interventions? No      I certify the face to face encounter for this home health care referral meets the CMS requirements and the encounter/clinical assessment with the patient was, in whole, or in part, for the medical condition(s) listed above, which is the primary reason for home health care. Based on my clinical findings: the service(s) are medically necessary, support the need for home health care, and the homebound criteria are met.  I certify that this patient has had a face to face encounter by myself.  Dick Goldman M.D. - NPI: [id number]

## 2017-04-05 NOTE — DISCHARGE INSTRUCTIONS
Discharge Instructions    Discharged to home by taxi with self. Discharged via wheelchair, hospital escort: Refused.  Special equipment needed: Cane    Be sure to schedule a follow-up appointment with your primary care doctor or any specialists as instructed.     Discharge Plan:   Diet Plan: Discussed  Activity Level: Discussed  Confirmed Follow up Appointment: Patient to Call and Schedule Appointment  Confirmed Symptoms Management: Discussed  Medication Reconciliation Updated: Yes  Influenza Vaccine Indication: Patient Refuses    I understand that a diet low in cholesterol, fat, and sodium is recommended for good health. Unless I have been given specific instructions below for another diet, I accept this instruction as my diet prescription.   Other diet: diabetic cardiac diet    Special Instructions: None    · Is patient discharged on Warfarin / Coumadin?   Yes    You are receiving the drug warfarin. Please understand the importance of monitoring warfarin with scheduled PT/INR blood draws.  Follow-up with a call to your personal Doctor's office (PATIENT STATES HE'S ESTABLISHED INR MONITORING WITH [facility]) in 3 days to schedule a PT/INR. .     IMPORTANT: HOW TO USE THIS INFORMATION:  This is a summary and does NOT have all possible information about this product. This information does not assure that this product is safe, effective, or appropriate for you. This information is not individual medical advice and does not substitute for the advice of your health care professional. Always ask your health care professional for complete information about this product and your specific health needs.      WARFARIN - ORAL (WARF-uh-rin)      COMMON BRAND NAME(S): Coumadin      WARNING:  Warfarin can cause very serious (possibly fatal) bleeding. This is more likely to occur when you first start taking this medication or if you take too much warfarin. To decrease your risk for bleeding, your doctor or other health  "care provider will monitor you closely and check your lab results (INR test) to make sure you are not taking too much warfarin. Keep all medical and laboratory appointments. Tell your doctor right away if you notice any signs of serious bleeding. See also Side Effects section.      USES:  This medication is used to treat blood clots (such as in deep vein thrombosis-DVT or pulmonary embolus-PE) and/or to prevent new clots from forming in your body. Preventing harmful blood clots helps to reduce the risk of a stroke or heart attack. Conditions that increase your risk of developing blood clots include a certain type of irregular heart rhythm (atrial fibrillation), heart valve replacement, recent heart attack, and certain surgeries (such as hip/knee replacement). Warfarin is commonly called a \"blood thinner,\" but the more correct term is \"anticoagulant.\" It helps to keep blood flowing smoothly in your body by decreasing the amount of certain substances (clotting proteins) in your blood.      HOW TO USE:  Read the Medication Guide provided by your pharmacist before you start taking warfarin and each time you get a refill. If you have any questions, ask your doctor or pharmacist. Take this medication by mouth with or without food as directed by your doctor or other health care professional, usually once a day. It is very important to take it exactly as directed. Do not increase the dose, take it more frequently, or stop using it unless directed by your doctor. Dosage is based on your medical condition, laboratory tests (such as INR), and response to treatment. Your doctor or other health care provider will monitor you closely while you are taking this medication to determine the right dose for you. Use this medication regularly to get the most benefit from it. To help you remember, take it at the same time each day. It is important to eat a balanced, consistent diet while taking warfarin. Some foods can affect how " warfarin works in your body and may affect your treatment and dose. Avoid sudden large increases or decreases in your intake of foods high in vitamin K (such as broccoli, cauliflower, cabbage, brussels sprouts, kale, spinach, and other green leafy vegetables, liver, green tea, certain vitamin supplements). If you are trying to lose weight, check with your doctor before you try to go on a diet. Cranberry products may also affect how your warfarin works. Limit the amount of cranberry juice (16 ounces/480 milliliters a day) or other cranberry products you may drink or eat.      SIDE EFFECTS:  Nausea, loss of appetite, or stomach/abdominal pain may occur. If any of these effects persist or worsen, tell your doctor or pharmacist promptly. Remember that your doctor has prescribed this medication because he or she has judged that the benefit to you is greater than the risk of side effects. Many people using this medication do not have serious side effects. This medication can cause serious bleeding if it affects your blood clotting proteins too much (shown by unusually high INR lab results). Even if your doctor stops your medication, this risk of bleeding can continue for up to a week. Tell your doctor right away if you have any signs of serious bleeding, including: unusual pain/swelling/discomfort, unusual/easy bruising, prolonged bleeding from cuts or gums, persistent/frequent nosebleeds, unusually heavy/prolonged menstrual flow, pink/dark urine, coughing up blood, vomit that is bloody or looks like coffee grounds, severe headache, dizziness/fainting, unusual or persistent tiredness/weakness, bloody/black/tarry stools, chest pain, shortness of breath, difficulty swallowing. Tell your doctor right away if any of these unlikely but serious side effects occur: persistent nausea/vomiting, severe stomach/abdominal pain, yellowing eyes/skin. This drug rarely has caused very serious (possibly fatal) problems if its effects lead  to small blood clots (usually at the beginning of treatment). This can lead to severe skin/tissue damage that may require surgery or amputation if left untreated. Patients with certain blood conditions (protein C or S deficiency) may be at greater risk. Get medical help right away if any of these rare but serious side effects occur: painful/red/purplish patches on the skin (such as on the toe, breast, abdomen), change in the amount of urine, vision changes, confusion, slurred speech, weakness on one side of the body. A very serious allergic reaction to this drug is rare. However, get medical help right away if you notice any symptoms of a serious allergic reaction, including: rash, itching/swelling (especially of the face/tongue/throat), severe dizziness, trouble breathing. This is not a complete list of possible side effects. If you notice other effects not listed above, contact your doctor or pharmacist. In the [place] - Call your doctor for medical advice about side effects. You may report side effects to FDA at 6-673-NHS-4398. In [place] - Call your doctor for medical advice about side effects. You may report side effects to [facility] [place] at 1-370.304.9648.      PRECAUTIONS:  Before taking warfarin, tell your doctor or pharmacist if you are allergic to it; or if you have any other allergies. This product may contain inactive ingredients, which can cause allergic reactions or other problems. Talk to your pharmacist for more details. Before using this medication, tell your doctor or pharmacist your medical history, especially of: blood disorders (such as anemia, hemophilia), bleeding problems (such as bleeding of the stomach/intestines, bleeding in the brain), blood vessel disorders (such as aneurysms), recent major injury/surgery, liver disease, alcohol use, mental/mood disorders (including memory problems), frequent falls/injuries. It is important that all your doctors and dentists know that you take warfarin. Before  having surgery or any medical/dental procedures, tell your doctor or dentist that you are taking this medication and about all the products you use (including prescription drugs, nonprescription drugs, and herbal products). Avoid getting injections into the muscles. If you must have an injection into a muscle (for example, a flu shot), it should be given in the arm. This way, it will be easier to check for bleeding and/or apply pressure bandages. This medication may cause stomach bleeding. Daily use of alcohol while using this medicine will increase your risk for stomach bleeding and may also affect how this medication works. Limit or avoid alcoholic beverages. If you have not been eating well, if you have an illness or infection that causes fever, vomiting, or diarrhea for more than 2 days, or if you start using any antibiotic medications, contact your doctor or pharmacist immediately because these conditions can affect how warfarin works. This medication can cause heavy bleeding. To lower the chance of getting cut, bruised, or injured, use great caution with sharp objects like safety razors and nail cutters. Use an electric razor when shaving and a soft toothbrush when brushing your teeth. Avoid activities such as contact sports. If you fall or injure yourself, especially if you hit your head, call your doctor immediately. Your doctor may need to check you. The Food & Drug Administration has stated that generic warfarin products are interchangeable. However, consult your doctor or pharmacist before switching warfarin products. Be careful not to take more than one medication that contains warfarin unless specifically directed by the doctor or health care provider who is monitoring your warfarin treatment. Older adults may be at greater risk for bleeding while using this drug. This medication is not recommended for use during pregnancy because of serious (possibly fatal) harm to an unborn baby. Discuss the use of  "reliable forms of birth control with your doctor. If you become pregnant or think you may be pregnant, tell your doctor immediately. If you are planning pregnancy, discuss a plan for managing your condition with your doctor before you become pregnant. Your doctor may switch the type of medication you use during pregnancy. Very small amounts of this medication may pass into breast milk but is unlikely to harm a nursing infant. Consult your doctor before breast-feeding.      DRUG INTERACTIONS:  Drug interactions may change how your medications work or increase your risk for serious side effects. This document does not contain all possible drug interactions. Keep a list of all the products you use (including prescription/nonprescription drugs and herbal products) and share it with your doctor and pharmacist. Do not start, stop, or change the dosage of any medicines without your doctor's approval. Warfarin interacts with many prescription, nonprescription, vitamin, and herbal products. This includes medications that are applied to the skin or inside the vagina or rectum. The interactions with warfarin usually result in an increase or decrease in the \"blood-thinning\" (anticoagulant) effect. Your doctor or other health care professional should closely monitor you to prevent serious bleeding or clotting problems. While taking warfarin, it is very important to tell your doctor or pharmacist of any changes in medications, vitamins, or herbal products that you are taking. Some products that may interact with this drug include: capecitabine, imatinib, mifepristone. Aspirin, aspirin-like drugs (salicylates), and nonsteroidal anti-inflammatory drugs (NSAIDs such as ibuprofen, naproxen, celecoxib) may have effects similar to warfarin. These drugs may increase the risk of bleeding problems if taken during treatment with warfarin. Carefully check all prescription/nonprescription product labels (including drugs applied to the skin " such as pain-relieving creams) since the products may contain NSAIDs or salicylates. Talk to your doctor about using a different medication (such as acetaminophen) to treat pain/fever. Low-dose aspirin and related drugs (such as clopidogrel, ticlopidine) should be continued if prescribed by your doctor for specific medical reasons such as heart attack or stroke prevention. Consult your doctor or pharmacist for more details. Many herbal products interact with warfarin. Tell your doctor before taking any herbal products, especially bromelains, coenzyme Q10, cranberry, danshen, dong quai, fenugreek, garlic, ginkgo biloba, ginseng, and [place]'s wort, among others. This medication may interfere with a certain laboratory test to measure theophylline levels, possibly causing false test results. Make sure laboratory personnel and all your doctors know you use this drug.      OVERDOSE:  If overdose is suspected, contact a poison control center or emergency room immediately.  residents can call the  National Poison Hotline at 1-266.331.7833. [place] residents can call a provincial poison control center. Symptoms of overdose may include: bloody/black/tarry stools, pink/dark urine, unusual/prolonged bleeding.      NOTES:  Do not share this medication with others. Laboratory and/or medical tests (such as INR, complete blood count) must be performed periodically to monitor your progress or check for side effects. Consult your doctor for more details.      MISSED DOSE:  For the best possible benefit, do not miss any doses. If you do miss a dose and remember on the same day, take it as soon as you remember. If you remember on the next day, skip the missed dose and resume your usual dosing schedule. Do not double the dose to catch up because this could increase your risk for bleeding. Keep a record of missed doses to give to your doctor or pharmacist. Contact your doctor or pharmacist if you miss 2 or more doses in a row.       STORAGE:  Store at room temperature away from light and moisture. Do not store in the bathroom. Keep all medications away from children and pets. Do not flush medications down the toilet or pour them into a drain unless instructed to do so. Properly discard this product when it is  or no longer needed. Consult your pharmacist or local waste disposal company for more details about how to safely discard your product.      MEDICAL ALERT:  Your condition and medication can cause complications in a medical emergency. For information about enrolling in MedicAlert, call 1-692.633.1310 ([place]) or 1-551.887.8442 ([place]).      Information last revised 2010 Copyright(c)  [employer]             · Is patient Post Blood Transfusion?  No    Depression / Suicide Risk    As you are discharged from this [facility] facility, it is important to learn how to keep safe from harming yourself.    Recognize the warning signs:  · Abrupt changes in personality, positive or negative- including increase in energy   · Giving away possessions  · Change in eating patterns- significant weight changes-  positive or negative  · Change in sleeping patterns- unable to sleep or sleeping all the time   · Unwillingness or inability to communicate  · Depression  · Unusual sadness, discouragement and loneliness  · Talk of wanting to die  · Neglect of personal appearance   · Rebelliousness- reckless behavior  · Withdrawal from people/activities they love  · Confusion- inability to concentrate     If you or a loved one observes any of these behaviors or has concerns about self-harm, here's what you can do:  · Talk about it- your feelings and reasons for harming yourself  · Remove any means that you might use to hurt yourself (examples: pills, rope, extension cords, firearm)  · Get professional help from the community (Mental Health, Substance Abuse, psychological counseling)  · Do not be alone:Call your Safe Contact- someone  whom you trust who will be there for you.  · Call your local CRISIS HOTLINE 339-3992 or 673-833-0840  · Call your local Children's Mobile Crisis Response Team [place] (526) 825-7257 or www.[employer]  · Call the toll free National Suicide Prevention Hotlines   · National Suicide Prevention Lifeline 368-223-DPIN (8697)  · [employer] Line Network 800-SUICIDE (475-9130)

## 2017-04-05 NOTE — DISCHARGE PLANNING
[facility] received a HH choice form. [facility] called ans spoke to SW on floor Dina to notified her that a HH order is needed.

## 2017-04-05 NOTE — PROGRESS NOTES
Patient discharged as ordered. AVS, home meds, follow up appointments including coumadin clinic (patient states he is established monitoring at [facility]). Pt states understanding to all.  Patient taken to [facility] via wheelchair by cna. Cab called for patient. Patient with cab voucher.

## 2017-04-06 NOTE — DISCHARGE SUMMARY
CHIEF COMPLAINT ON ADMISSION  Chief Complaint   Patient presents with   • Chest Pain   • Nausea   • Malaise       CODE STATUS  Full    HPI    Morales Olivier is a 61 y.o. Male with PMH significant for CAD, HTN, AFib, Artificial Mitral Valve x2 on OAC, pacemaker, DM2, hx of DVT who presents to ED from clinic for general malaise and non-specific symptoms. He states he just hasn't felt right since he had valve replacement surgery 3/8/17. Over the last 3 weeks he endorses nausea with dry heaving but no vomiting, weakness, dizziness, malaise, cough, dry mouth, waxing and waning pain (back, side, neck, abdomen, arms, feet), headache, chills, lack of appetite. He was discharged from [facility] on Friday.    HOSPITAL COURSE  Pt was admitted on 4/3/17 for concern over acalculous cholecystitis. A gall bladder US revealed increased gall bladder Wall thickness and and sludge without stones, LFT's were normal, Lipase was normal, troponins were neg, no elevated WBC and vital signs were stable. Chemistries were normal. On call surgeon was contacted and he recommended a HIDA scan. That was done on hospital DAY 2 and did not demonstrate any abnormalities. The pt was feeling much better and his abd pain had essentially resolved. He was discharged home in Stable condition with home health. All other chronic problems were stable through the patients hospital stay.  .    SPECIFIC OUTPATIENT FOLLOW-UP  PCP in one week    DISCHARGE PROBLEM LIST  #Artificial Mitral Valve  #CHF  #H/O Diabetes mellitus  # h/o atrial fibrillation.     #h/o DVT  #Hypertension,    #BPH  .    FOLLOW UP  Future Appointments  Date Time Provider Department Center   4/19/2017 11:20 AM Sumanth Yancey M.D. [facility] None     Nelia Huber M.D.  [address]  [address]  [facility] [address]  [phone]    Schedule an appointment as soon as possible for a visit in 2 weeks  follow up appointment      MEDICATIONS ON DISCHARGE   Morales Olivier   Home Medication Instructions MICHELLE:[id number]     Printed on:04/05/17 2041   Medication Information                      amiodarone (PACERONE) 400 MG tablet  Take 1 Tab by mouth every day.             metformin (GLUCOPHAGE) 500 MG Tab  Take 1 Tab by mouth 2 times a day, with meals.             metoprolol (LOPRESSOR) 25 MG TABS  Take 1 Tab by mouth 2 Times a Day.             tamsulosin (FLOMAX) 0.4 MG capsule  Take 0.4 mg by mouth ONE-HALF HOUR AFTER BREAKFAST.             warfarin (COUMADIN) 2.5 MG Tab  Take 2.5 mg by mouth every Tuesday and Thursday. Indications: Blood Clot in a Deep Vein             warfarin (COUMADIN) 5 MG Tab  Take 5 mg by mouth every Monday, Wednesday, Friday, and Saturday. Indications: Blood Clot in a Deep Vein, mitral valve replacement                 DIET  Cardiac    ACTIVITY  As tolerated      CONSULTATIONS  On call Surgeon    PROCEDURES  HIDA scan    LABORATORY  Lab Results   Component Value Date/Time    SODIUM 136 04/04/2017 02:59 AM    POTASSIUM 4.1 04/04/2017 02:59 AM    CHLORIDE 104 04/04/2017 02:59 AM    CO2 21 04/04/2017 02:59 AM    GLUCOSE 81 04/04/2017 02:59 AM    BUN 18 04/04/2017 02:59 AM    CREATININE 1.13 04/04/2017 02:59 AM        Lab Results   Component Value Date/Time    WBC 7.7 04/04/2017 02:59 AM    HEMOGLOBIN 12.7* 04/04/2017 02:59 AM    HEMATOCRIT 37.4* 04/04/2017 02:59 AM    PLATELET COUNT 140* 04/04/2017 02:59 AM        IMAGING  HIDA 4/4/17  FINDINGS:  There is prompt uptake of tracer in the hepatic parenchyma.  The gallbladder fills with activity within 60 minutes and activity is excreted normally into the small bowel.  The gallbladder ejection fraction is 89%. Normal gallbladder ejection fraction is 38% or greater.         Impression          1. Normal hepatobiliary scan. No evidence of acute cholecystitis.    2. Normal gallbladder ejection fraction.     US Gallbladder 4/3/17  FINDINGS:    The liver is normal in contour. There is no evidence of solid mass lesion. The liver measures 12.6 cm in length.    The  gallbladder is normal. There is no evidence of cholelithiasis. Gallbladder sludge is noted.  The gallbladder wall thickness measures 3.9 mm.    There is no pericholecystic fluid.    The common duct measures 1.7 mm.    The pancreas is obscured by bowel gas.    The visualized aorta is normal in caliber.    The inferior vena cava is not visualized.    The portal vein is patent with hepatopetal flow.    The right kidney measures 10.2 cm..    There is no ascites.         Impression          1.  Gallbladder wall thickness with gallbladder sludge but without visualized stones, appearance concerning for acalculous cholecystitis.

## 2017-04-11 ENCOUNTER — TELEPHONE (OUTPATIENT)
Dept: VASCULAR LAB | Facility: MEDICAL CENTER | Age: 62
End: 2017-04-11

## 2017-04-11 NOTE — TELEPHONE ENCOUNTER
Left voicemail message for patient to return call to [facility] to establish care      Pt states that he is managed by adelaide Huber at [facility]  ALETHEA MillardD

## 2017-04-19 ENCOUNTER — OFFICE VISIT (OUTPATIENT)
Dept: CARDIOLOGY | Facility: MEDICAL CENTER | Age: 62
End: 2017-04-19
Payer: MEDICARE

## 2017-04-19 VITALS
WEIGHT: 224 LBS | SYSTOLIC BLOOD PRESSURE: 92 MMHG | OXYGEN SATURATION: 95 % | HEART RATE: 87 BPM | BODY MASS INDEX: 32.07 KG/M2 | DIASTOLIC BLOOD PRESSURE: 70 MMHG | HEIGHT: 70 IN

## 2017-04-19 DIAGNOSIS — Z79.01 ANTICOAGULATED: ICD-10-CM

## 2017-04-19 DIAGNOSIS — I10 ESSENTIAL HYPERTENSION, BENIGN: ICD-10-CM

## 2017-04-19 DIAGNOSIS — Z95.2 H/O MITRAL VALVE REPLACEMENT: ICD-10-CM

## 2017-04-19 DIAGNOSIS — Z95.0 CARDIAC PACEMAKER IN SITU: ICD-10-CM

## 2017-04-19 DIAGNOSIS — G45.8 OTHER SPECIFIED TRANSIENT CEREBRAL ISCHEMIAS: ICD-10-CM

## 2017-04-19 DIAGNOSIS — I48.91 ATRIAL FIBRILLATION, UNSPECIFIED TYPE (HCC): ICD-10-CM

## 2017-04-19 PROBLEM — I05.9 MITRAL VALVE DISORDER: Status: RESOLVED | Noted: 2017-03-08 | Resolved: 2017-04-19

## 2017-04-19 PROCEDURE — 99214 OFFICE O/P EST MOD 30 MIN: CPT | Performed by: INTERNAL MEDICINE

## 2017-04-19 PROCEDURE — G8432 DEP SCR NOT DOC, RNG: HCPCS | Performed by: INTERNAL MEDICINE

## 2017-04-19 PROCEDURE — G8417 CALC BMI ABV UP PARAM F/U: HCPCS | Performed by: INTERNAL MEDICINE

## 2017-04-19 PROCEDURE — 1036F TOBACCO NON-USER: CPT | Performed by: INTERNAL MEDICINE

## 2017-04-19 PROCEDURE — 3017F COLORECTAL CA SCREEN DOC REV: CPT | Mod: 8P | Performed by: INTERNAL MEDICINE

## 2017-04-19 ASSESSMENT — ENCOUNTER SYMPTOMS
PND: 0
PALPITATIONS: 0
SHORTNESS OF BREATH: 0
CHILLS: 0
WEAKNESS: 1
ORTHOPNEA: 0
MYALGIAS: 0
INSOMNIA: 0
DIZZINESS: 0
FEVER: 0
BLURRED VISION: 0
LOSS OF CONSCIOUSNESS: 0
ABDOMINAL PAIN: 0
BRUISES/BLEEDS EASILY: 1

## 2017-04-19 NOTE — PROGRESS NOTES
Subjective:   Morales Olivier is a 61 y.o. male who presents today for hospital follow up with history of mitral valve replacement and study result review.    Since the discharge from [facility] on 03/12/17 for redo mitral valve replacement and 04/04/17 status abdominal discomfort, he has continued to experiencing fatigue, weakness and chest wall tenderness. He denies chest pain, palpitations, nausea/vomiting or diaphoresis.    Past Medical History   Diagnosis Date   • Gout    • CAD (coronary artery disease)    • Hypertension    • Atrial fibrillation (CMS-HCC)    • Pacemaker    • Heart valve disease      mitral valve replacement (bovine)   • Renal disorder      kidney stones   • Bronchitis    • Type II or unspecified type diabetes mellitus without mention of complication, not stated as uncontrolled      DM type II- diet controlled   • Personal history of venous thrombosis and embolism 2009     DVT right leg     Past Surgical History   Procedure Laterality Date   • Mitral valve replace  3/14/08     Performed by JEANA MARTELL at SURGERY [facility] ORS   • Maze procedure  3/14/08     Performed by JEANA MARTELL at SURGERY [facility] ORS   • Angiogram  7/3/2009     Performed by MORGAN WARD at SURGERY [facility] ORS   • Angiogram  7/4/2009     Performed by MICHEL URBINA at SURGERY [facility] ORS   • Cath removal  7/4/2009     Performed by MICHEL URBINA at SURGERY [facility] ORS   • Thrombectomy  7/4/2009     Performed by MICHEL URBINA at SURGERY [facility] ORS   • Embolectomy  7/4/2009     Performed by MICHEL URBINA at SURGERY [facility] ORS   • Irrigation & debridement general  7/20/2009     Performed by MICHEL URBINA at SURGERY [facility] ORS   • Wide excision  7/20/2009     Performed by MICHEL URBINA at [facility] ORS   • Other cardiac surgery  2008     mitral valve replacement   • Other abdominal surgery       imbulica repair    • Mitral valve replace   3/8/2017     Procedure: MITRAL VALVE REPLACE-REDO;  Surgeon: Cornelia Wright M.D.;  Location: SURGERY [facility];  Service:    • Logan  3/8/2017     Procedure: LOGAN;  Surgeon: Cornelia Wright M.D.;  Location: SURGERY [facility];  Service:      History reviewed. No pertinent family history.  History   Smoking status   • Former Smoker   • Quit date: 01/01/1981   Smokeless tobacco   • Never Used     No Known Allergies    Medications reviewed.    Outpatient Encounter Prescriptions as of 4/19/2017   Medication Sig Dispense Refill   • Acetaminophen (TYLENOL PO) Take  by mouth.     • warfarin (COUMADIN) 5 MG Tab Take 5 mg by mouth every Monday, Wednesday, Friday, and Saturday. Indications: Blood Clot in a Deep Vein, mitral valve replacement     • warfarin (COUMADIN) 2.5 MG Tab Take 2.5 mg by mouth every Tuesday and Thursday. Indications: Blood Clot in a Deep Vein     • amiodarone (PACERONE) 400 MG tablet Take 1 Tab by mouth every day. (Patient taking differently: Take 200 mg by mouth every day.)     • metformin (GLUCOPHAGE) 500 MG Tab Take 1 Tab by mouth 2 times a day, with meals. 60 Tab    • tamsulosin (FLOMAX) 0.4 MG capsule Take 0.4 mg by mouth ONE-HALF HOUR AFTER BREAKFAST.     • metoprolol (LOPRESSOR) 25 MG TABS Take 1 Tab by mouth 2 Times a Day. 60 Tab 11     No facility-administered encounter medications on file as of 4/19/2017.     Review of Systems   Constitutional: Positive for malaise/fatigue. Negative for fever and chills.   HENT: Negative for congestion.    Eyes: Negative for blurred vision.   Respiratory: Negative for shortness of breath.    Cardiovascular: Negative for chest pain, palpitations, orthopnea, leg swelling and PND.   Gastrointestinal: Negative for abdominal pain.   Genitourinary: Negative for dysuria.   Musculoskeletal: Negative for myalgias and joint pain.   Skin: Negative for itching and rash.   Neurological: Positive for weakness. Negative for dizziness and loss of consciousness.  "  Endo/Heme/Allergies: Bruises/bleeds easily.   Psychiatric/Behavioral: The patient does not have insomnia.         Objective:   BP 92/70 mmHg  Pulse 87  Ht 1.778 m (5' 10\")  Wt 101.606 kg (224 lb)  BMI 32.14 kg/m2  SpO2 95%    Physical Exam   Constitutional: He is oriented to person, place, and time. He appears well-developed and well-nourished.   Using cane.   HENT:   Head: Normocephalic and atraumatic.   Eyes: Conjunctivae are normal. Pupils are equal, round, and reactive to light.   Neck: Normal range of motion. Neck supple.   Cardiovascular: Normal rate.  An irregularly irregular rhythm present.   Pulmonary/Chest: Effort normal and breath sounds normal.   Abdominal: Soft. Bowel sounds are normal.   Musculoskeletal: Normal range of motion. He exhibits no edema.   Neurological: He is alert and oriented to person, place, and time.   Skin: Skin is warm and dry. Rash noted.   Psychiatric: He has a normal mood and affect.     CARDIAC STUDIES/PROCEDURES:    CARDIAC CATHETERIZATION CONCLUSIONS by Dr. Kerr (01/08/17)  1.  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  2.  Pulmonary hypertension.  Moderate.  3.  EF 47%.  4.  Patent coronary arteries.    CARDIAC CATHETERIZATION CONCLUSIONS by Dr. Peterson (03/13/08)  3. Essentially normal coronary arterial tree and mild plaquing noted.  4. Unusually small distal half of the left anterior descending coronary artery.    CAROTID ULTRASOUND (03/07/17)  Normal carotids, subclavians and vertebrals    CAROTID ULTRASOUND (11/15/14)  Antegrade flow, bilateral vertebral arteries.   Flow within both subclavian arteries appears to be within normal limits.   Normal bilateral cervical carotid system.    ECHOCARDIOGRAM CONCLUSIONS (03/18/17)  1. Left ventricular ejection fraction is visually estimated to be 60%.   2. There is apical inferior septal hypokinesis.   3. Known mitral valve bioprosthesis which is functioning normally with   appropriate transvalvular " gradient. Mean transvalvular gradient is  5   mmHg at a heart rate of  80 BPM.  4. Right ventricular systolic pressure is estimated to be 25 mmHg.    ECHOCARDIOGRAM CONCLUSIONS (11/15/16)  Prior echocardiogram 5/15/2016. Compared to the report of the study   done - there has been no significant change.   Grossly normal left ventricular systolic function.  Left ventricular ejection fraction is visually estimated to be 50%.  Diastolic function is difficult to assess.   Pacer/ICD wire seen in right ventricle.  Known mitral valve bioprosthesis with probable moderate to severe stenosis.  Moderate tricuspid regurgitation.  Estimated right ventricular systolic pressure is 45 mmHg.    ECHOCARDIOGRAM CONCLUSIONS (05/05/16)  Prior study is available for comparison, 06/30/2015.   Known mitral valve bioprosthesis with probable stenosis based on ASE   criteria ( peak velocity 1.9-2.5 m/s, mean gradient 6-10).  Mean transvalvular gradient is 9 mmHg at a heart rate of  94 BPM.  Peak MV velocity 2.1 m/s Mild mitral regurgitation.  Mild concentric left ventricular hypertrophy.  Low normal left ventricular systolic function.  Left ventricular ejection fraction is visually estimated to be 50%.  Severely dilated left atrium.  Compared to the prior study of 6/3/2015, the prosthetic MV gradients   were actually slightly higher on the prior study.  The curent study is compatible with probable prosthetic valve stenosis   based on ASE criteria.    ECHOCARDIOGRAM CONCLUSIONS (06/30/15)  Normal left ventricular chamber size.   Mild concentric left ventricular hypertrophy.   Mildly reduced left ventricular systolic function.   Left ventricular ejection fraction is 50% to 55%.   Diastolic function is difficult to assess.  Severely dilated right atrium.   Catheter/pacemaker wire present in the right atrial cavity.  Severely dilated left atrium.  Mitral Valve Replacement. Mean transvalvular gradient is 9-10 mmHg.   Mild to moderate mitral  regurgitation. At least moderate mitral stenosis.   Compared to the report of July 2014: the prosthetic mitral valve   stenosis is more severe. Prior mean gradient was 8 mm Hg.    ECHOCARDIOGRAM CONCLUSIONS (07/25/14)  Low normal left ventricular systolic function.  Left ventricular ejection fraction is 50% to 55%.  Bovine bioprosthetic valve with a mean gradient of 8 mmHg.  Moderate mitral regurgitation.  Moderate bioprosthetic valve stenosis.    EKG performed on (04/03/17) was reviewed: EKG shows paced rhythm.  EKG performed on (12/14/16) EKG shows sinus tachycardia.  EKG performed on (11/22/16) EKG shows sinus rhythm.  EKG performed on (01/28/15) EKG shows atrial fibrillation.    Laboratory results of (12/06/16) were reviewed. Cholesterol profile of 167/187/24/105 noted.  Laboratory results of (11/14/14) Cholesterol profile of 134/138/23/83 noted.    TRANSESOPHAGEAL ECHOCARDIOGRAM CONCLUSIONS by Dr. Ball (03/08/17)  1)  Severe MS of BP valve.  2)  Moderate to Severe TR  3)  23mm BPV:  Mean Gradient 3 mmhg.    TRANSESOPHAGEAL ECHOCARDIOGRAM CONCLUSIONS by Dr. Fitch (12/06/16)  Patient in atrial fibrillation during exam.  Grossly normal   biventricular systolic function. Known bioprosthetic mitral valve with   evidence of leaflet thickening and decreased mobility. Severe   bioprosthetic mitral stenosis. Mean gradient 10 mm Hg, valve area by   3-D planimetry is 0.9 cm2.  Moderate-severe tricuspid regurgitation.    Assessment:     1. H/O mitral valve replacement    2. Atrial fibrillation    3. Cardiac pacemaker in situ    4. Chronic anticoagulation    5. Hypertension      Medical Decision Making:  Today's Assessment / Status / Plan:     1. History of mitral valve replacement (29-mm Perimount bioprosthetic valve), resection of left atrial tumor (thrombus) by Dr. Wright on 03/10/08 and Redo mitral valve replacement (29 mm Medtronic Mosaic porcine valve) again by Dr. Wright on 3/08/17: He is still feeling weak  following his surgery.  2. Atrial fibrillation/sick sinus syndrome with pacemaker and chronic anticoagulation therapy (warfarin): We will discontinue amiodarone and reasses his fatigue.  3. Hypertension: Blood pressure is well controlled.  4. History of embolus to the right popliteal artery treated with thrombolytic.    We will follow up in three month to reassess his symptoms.    CC ALYSSAR

## 2017-04-19 NOTE — MR AVS SNAPSHOT
"        Morales OLIVAS Charline   2017 11:20 AM   Office Visit   MRN: [id number]    Department:  [facility]   Dept Phone:  [phone]    Description:  Male : 1955   Provider:  Sumanth Yancey M.D.           Reason for Visit     Follow-Up           Allergies as of 2017     No Known Allergies      You were diagnosed with     H/O mitral valve replacement   [404176]       Atrial fibrillation, unspecified type (CMS-HCC)   [6204205]       Cardiac pacemaker in situ   [V45.01.ICD-9-CM]       Anticoagulated   [972661]       Essential hypertension, benign   [401.1.ICD-9-CM]       Other specified transient cerebral ischemias   [435.8.ICD-9-CM]         Vital Signs     Blood Pressure Pulse Height Weight Body Mass Index Oxygen Saturation    92/70 mmHg 87 1.778 m (5' 10\") 101.606 kg (224 lb) 32.14 kg/m2 95%    Smoking Status                   Former Smoker           Basic Information     Date Of Birth Sex Race Ethnicity Preferred Language    1955 Male White Non- English      Problem List              ICD-10-CM Priority Class Noted - Resolved    Open wound T14.8   2009 - Present    GOUT    2009 - Present    Anticoagulated Z79.01   2010 - Present    Cardiac pacemaker in situ Z95.0   2014 - Present    Rib fracture S22.39XA   2014 - Present    Orbital floor fracture (HCC) S02.30XA   2014 - Present    TIA (transient ischemic attack) G45.9   2014 - Present    Atrial fibrillation (CMS-HCC) I48.91   2015 - Present    Chronic anticoagulation Z79.01   2015 - Present    H/O mitral valve replacement Z95.2   2015 - Present    COPD exacerbation (CMS-HCC) J44.1   2016 - Present    Bronchitis J40   2016 - Present    Chest pain R07.9   3/18/2017 - Present    Essential hypertension, benign I10   2017 - Present      Health Maintenance        Date Due Completion Dates    IMM PNEUMOCOCCAL 19-64 (ADULT) MEDIUM RISK SERIES (1 of 1 - PPSV23) 1973 ---   " COLONOSCOPY 6/23/2004 ---    IMM ZOSTER VACCINE 6/23/2014 ---    IMM DTaP/Tdap/Td Vaccine (2 - Td) 7/31/2024 7/31/2014            Current Immunizations     Tdap Vaccine 7/31/2014  3:29 PM      Below and/or attached are the medications your provider expects you to take. Review all of your home medications and newly ordered medications with your provider and/or pharmacist. Follow medication instructions as directed by your provider and/or pharmacist. Please keep your medication list with you and share with your provider. Update the information when medications are discontinued, doses are changed, or new medications (including over-the-counter products) are added; and carry medication information at all times in the event of emergency situations     Allergies:  No Known Allergies          Medications  Valid as of: April 19, 2017 - 12:00 PM    Generic Name Brand Name Tablet Size Instructions for use    Acetaminophen   Take  by mouth.        Amiodarone HCl (Tab) PACERONE 400 MG Take 1 Tab by mouth every day.        MetFORMIN HCl (Tab) GLUCOPHAGE 500 MG Take 1 Tab by mouth 2 times a day, with meals.        Metoprolol Tartrate (Tab) LOPRESSOR 25 MG Take 1 Tab by mouth 2 Times a Day.        Tamsulosin HCl (Cap) FLOMAX 0.4 MG Take 0.4 mg by mouth ONE-HALF HOUR AFTER BREAKFAST.        Warfarin Sodium (Tab) COUMADIN 5 MG Take 5 mg by mouth every Monday, Wednesday, Friday, and Saturday. Indications: Blood Clot in a Deep Vein, mitral valve replacement        Warfarin Sodium (Tab) COUMADIN 2.5 MG Take 2.5 mg by mouth every Tuesday and Thursday. Indications: Blood Clot in a Deep Vein        .                 Medicines prescribed today were sent to:     [facility] [address]    [address] [place] [address]    Phone: [phone] Fax: [phone]    Open 24 Hours?: No      Medication refill instructions:       If your prescription bottle indicates you have medication refills left, it is not necessary to  call your provider’s office. Please contact your pharmacy and they will refill your medication.    If your prescription bottle indicates you do not have any refills left, you may request refills at any time through one of the following ways: The online [employer] system (except Urgent Care), by calling your provider’s office, or by asking your pharmacy to contact your provider’s office with a refill request. Medication refills are processed only during regular business hours and may not be available until the next business day. Your provider may request additional information or to have a follow-up visit with you prior to refilling your medication.   *Please Note: Medication refills are assigned a new Rx number when refilled electronically. Your pharmacy may indicate that no refills were authorized even though a new prescription for the same medication is available at the pharmacy. Please request the medicine by name with the pharmacy before contacting your provider for a refill.           MyChart Status: Patient Declined

## 2017-04-19 NOTE — Clinical Note
[facility]   [address], [address]  [place] [address]  Phone: [phone]  Fax: [phone]              Morales Olivier  1955    Encounter Date: 4/19/2017    Sumanth Yancey M.D.          PROGRESS NOTE:  Subjective:   Morales Olivier is a 61 y.o. male who presents today for hospital follow up with history of mitral valve replacement and study result review.    Since the discharge from [facility] on 03/12/17 for redo mitral valve replacement and 04/04/17 status abdominal discomfort, he has continued to experiencing fatigue, weakness and chest wall tenderness. He denies chest pain, palpitations, nausea/vomiting or diaphoresis.    Past Medical History   Diagnosis Date   • Gout    • CAD (coronary artery disease)    • Hypertension    • Atrial fibrillation (CMS-[facility])    • Pacemaker    • Heart valve disease      mitral valve replacement (bovine)   • Renal disorder      kidney stones   • Bronchitis    • Type II or unspecified type diabetes mellitus without mention of complication, not stated as uncontrolled      DM type II- diet controlled   • Personal history of venous thrombosis and embolism 2009     DVT right leg     Past Surgical History   Procedure Laterality Date   • Mitral valve replace  3/14/08     Performed by JEANA MARTELL at SURGERY [facility] ORS   • Maze procedure  3/14/08     Performed by JEANA MARTELL at [facility]   • Angiogram  7/3/2009     Performed by MORGAN WARD at SURGERY [facility] ORS   • Angiogram  7/4/2009     Performed by MICHEL URBINA at SURGERY [facility] ORS   • Cath removal  7/4/2009     Performed by MICHEL URBINA at SURGERY [facility] ORS   • Thrombectomy  7/4/2009     Performed by MICHEL URBINA at [facility]   • Embolectomy  7/4/2009     Performed by MICHEL URBINA at [facility]   • Irrigation & debridement general  7/20/2009     Performed by MICHEL URBINA  at SURGERY [facility]   • Wide excision  7/20/2009     Performed by MICHEL URBINA at SURGERY [facility]   • Other cardiac surgery  2008     mitral valve replacement   • Other abdominal surgery       imbulica repair    • Mitral valve replace  3/8/2017     Procedure: MITRAL VALVE REPLACE-REDO;  Surgeon: Cornelia Wright M.D.;  Location: SURGERY [facility];  Service:    • Logan  3/8/2017     Procedure: LOGAN;  Surgeon: Cornelia Wright M.D.;  Location: SURGERY [facility];  Service:      History reviewed. No pertinent family history.  History   Smoking status   • Former Smoker   • Quit date: 01/01/1981   Smokeless tobacco   • Never Used     No Known Allergies    Medications reviewed.    Outpatient Encounter Prescriptions as of 4/19/2017   Medication Sig Dispense Refill   • Acetaminophen (TYLENOL PO) Take  by mouth.     • warfarin (COUMADIN) 5 MG Tab Take 5 mg by mouth every Monday, Wednesday, Friday, and Saturday. Indications: Blood Clot in a Deep Vein, mitral valve replacement     • warfarin (COUMADIN) 2.5 MG Tab Take 2.5 mg by mouth every Tuesday and Thursday. Indications: Blood Clot in a Deep Vein     • amiodarone (PACERONE) 400 MG tablet Take 1 Tab by mouth every day. (Patient taking differently: Take 200 mg by mouth every day.)     • metformin (GLUCOPHAGE) 500 MG Tab Take 1 Tab by mouth 2 times a day, with meals. 60 Tab    • tamsulosin (FLOMAX) 0.4 MG capsule Take 0.4 mg by mouth ONE-HALF HOUR AFTER BREAKFAST.     • metoprolol (LOPRESSOR) 25 MG TABS Take 1 Tab by mouth 2 Times a Day. 60 Tab 11     No facility-administered encounter medications on file as of 4/19/2017.     Review of Systems   Constitutional: Positive for malaise/fatigue. Negative for fever and chills.   HENT: Negative for congestion.    Eyes: Negative for blurred vision.   Respiratory: Negative for shortness of breath.    Cardiovascular: Negative for chest pain, palpitations, orthopnea, leg swelling and PND.   Gastrointestinal:  "Negative for abdominal pain.   Genitourinary: Negative for dysuria.   Musculoskeletal: Negative for myalgias and joint pain.   Skin: Negative for itching and rash.   Neurological: Positive for weakness. Negative for dizziness and loss of consciousness.   Psychiatric/Behavioral: The patient does not have insomnia.         Objective:   BP 92/70 mmHg  Pulse 87  Ht 1.778 m (5' 10\")  Wt 101.606 kg (224 lb)  BMI 32.14 kg/m2  SpO2 95%    Physical Exam   Constitutional: He is oriented to person, place, and time. He appears well-developed and well-nourished.   HENT:   Head: Normocephalic and atraumatic.   Eyes: Conjunctivae are normal. Pupils are equal, round, and reactive to light.   Neck: Normal range of motion. Neck supple.   Cardiovascular: Normal rate.  An irregularly irregular rhythm present.   Pulmonary/Chest: Effort normal and breath sounds normal.   Abdominal: Soft. Bowel sounds are normal.   Musculoskeletal: Normal range of motion. He exhibits no edema.   Neurological: He is alert and oriented to person, place, and time.   Skin: Skin is warm and dry. Rash noted.   Psychiatric: He has a normal mood and affect.     CARDIAC STUDIES/PROCEDURES:    CARDIAC CATHETERIZATION CONCLUSIONS by Dr. Kerr (01/08/17)  1.  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  2.  Pulmonary hypertension.  Moderate.  3.  EF 47%.  4.  Patent coronary arteries.    CARDIAC CATHETERIZATION CONCLUSIONS by Dr. Peterson (03/13/08)  3. Essentially normal coronary arterial tree and mild plaquing noted.  4. Unusually small distal half of the left anterior descending coronary artery.    CAROTID ULTRASOUND (03/07/17)  Normal carotids, subclavians and vertebrals    CAROTID ULTRASOUND (11/15/14)  Antegrade flow, bilateral vertebral arteries.   Flow within both subclavian arteries appears to be within normal limits.   Normal bilateral cervical carotid system.    ECHOCARDIOGRAM CONCLUSIONS (03/18/17)  1. Left ventricular ejection " fraction is visually estimated to be 60%.   2. There is apical inferior septal hypokinesis.   3. Known mitral valve bioprosthesis which is functioning normally with   appropriate transvalvular gradient. Mean transvalvular gradient is  5   mmHg at a heart rate of  80 BPM.  4. Right ventricular systolic pressure is estimated to be 25 mmHg.    ECHOCARDIOGRAM CONCLUSIONS (11/15/16)  Prior echocardiogram 5/15/2016. Compared to the report of the study   done - there has been no significant change.   Grossly normal left ventricular systolic function.  Left ventricular ejection fraction is visually estimated to be 50%.  Diastolic function is difficult to assess.   Pacer/ICD wire seen in right ventricle.  Known mitral valve bioprosthesis with probable moderate to severe stenosis.  Moderate tricuspid regurgitation.  Estimated right ventricular systolic pressure is 45 mmHg.    ECHOCARDIOGRAM CONCLUSIONS (05/05/16)  Prior study is available for comparison, 06/30/2015.   Known mitral valve bioprosthesis with probable stenosis based on ASE   criteria ( peak velocity 1.9-2.5 m/s, mean gradient 6-10).  Mean transvalvular gradient is 9 mmHg at a heart rate of  94 BPM.  Peak MV velocity 2.1 m/s Mild mitral regurgitation.  Mild concentric left ventricular hypertrophy.  Low normal left ventricular systolic function.  Left ventricular ejection fraction is visually estimated to be 50%.  Severely dilated left atrium.  Compared to the prior study of 6/3/2015, the prosthetic MV gradients   were actually slightly higher on the prior study.  The curent study is compatible with probable prosthetic valve stenosis   based on ASE criteria.    ECHOCARDIOGRAM CONCLUSIONS (06/30/15)  Normal left ventricular chamber size.   Mild concentric left ventricular hypertrophy.   Mildly reduced left ventricular systolic function.   Left ventricular ejection fraction is 50% to 55%.   Diastolic function is difficult to assess.  Severely dilated right atrium.      Catheter/pacemaker wire present in the right atrial cavity.  Severely dilated left atrium.  Mitral Valve Replacement. Mean transvalvular gradient is 9-10 mmHg.   Mild to moderate mitral regurgitation. At least moderate mitral stenosis.   Compared to the report of July 2014: the prosthetic mitral valve   stenosis is more severe. Prior mean gradient was 8 mm Hg.    ECHOCARDIOGRAM CONCLUSIONS (07/25/14)  Low normal left ventricular systolic function.  Left ventricular ejection fraction is 50% to 55%.  Bovine bioprosthetic valve with a mean gradient of 8 mmHg.  Moderate mitral regurgitation.  Moderate bioprosthetic valve stenosis.    EKG performed on (04/03/17) was reviewed: EKG shows paced rhythm.  EKG performed on (12/14/16) EKG shows sinus tachycardia.  EKG performed on (11/22/16) EKG shows sinus rhythm.  EKG performed on (01/28/15) EKG shows atrial fibrillation.    Laboratory results of (12/06/16) were reviewed. Cholesterol profile of 167/187/24/105 noted.  Laboratory results of (11/14/14) Cholesterol profile of 134/138/23/83 noted.    TRANSESOPHAGEAL ECHOCARDIOGRAM CONCLUSIONS by Dr. Ball (03/08/17)  1)  Severe MS of BP valve.  2)  Moderate to Severe TR  3)  23mm BPV:  Mean Gradient 3 mmhg.    TRANSESOPHAGEAL ECHOCARDIOGRAM CONCLUSIONS by Dr. Fitch (12/06/16)  Patient in atrial fibrillation during exam.  Grossly normal   biventricular systolic function. Known bioprosthetic mitral valve with   evidence of leaflet thickening and decreased mobility. Severe   bioprosthetic mitral stenosis. Mean gradient 10 mm Hg, valve area by   3-D planimetry is 0.9 cm2.  Moderate-severe tricuspid regurgitation.    Assessment:     1. H/O mitral valve replacement    2. Atrial fibrillation    3. Cardiac pacemaker in situ    4. Chronic anticoagulation    5. Hypertension      Medical Decision Making:  Today's Assessment / Status / Plan:     1. History of mitral valve replacement (29-mm Perimount bioprosthetic valve), resection of  left atrial tumor (thrombus) by Dr. Wright on 03/10/08 and Redo mitral valve replacement (29 mm Medtronic Mosaic porcine valve) again by Dr. Wright on 3/08/17: He is still feeling weak following his surgery.  2. Atrial fibrillation/sick sinus syndrome with pacemaker and chronic anticoagulation therapy (warfarin): We will discontinue amiodarone and reasses his fatigue.  3. Hypertension: Blood pressure is well controlled.  4. History of embolus to the right popliteal artery treated with thrombolytic.    We will follow up in three month to reassess his symptoms.    CC [facility]         No Recipients

## 2017-06-07 ENCOUNTER — NON-PROVIDER VISIT (OUTPATIENT)
Dept: CARDIOLOGY | Facility: MEDICAL CENTER | Age: 62
End: 2017-06-07
Payer: MEDICARE

## 2017-06-07 ENCOUNTER — OFFICE VISIT (OUTPATIENT)
Dept: CARDIOLOGY | Facility: MEDICAL CENTER | Age: 62
End: 2017-06-07
Payer: MEDICARE

## 2017-06-07 VITALS
SYSTOLIC BLOOD PRESSURE: 110 MMHG | DIASTOLIC BLOOD PRESSURE: 78 MMHG | BODY MASS INDEX: 34.79 KG/M2 | WEIGHT: 243 LBS | OXYGEN SATURATION: 95 % | HEART RATE: 92 BPM | HEIGHT: 70 IN

## 2017-06-07 DIAGNOSIS — G45.9 TRANSIENT CEREBRAL ISCHEMIA, UNSPECIFIED TYPE: ICD-10-CM

## 2017-06-07 DIAGNOSIS — Z95.2 H/O MITRAL VALVE REPLACEMENT: ICD-10-CM

## 2017-06-07 DIAGNOSIS — I48.91 ATRIAL FIBRILLATION, UNSPECIFIED TYPE (HCC): ICD-10-CM

## 2017-06-07 DIAGNOSIS — Z79.01 CHRONIC ANTICOAGULATION: ICD-10-CM

## 2017-06-07 DIAGNOSIS — J40 BRONCHITIS: ICD-10-CM

## 2017-06-07 PROBLEM — R07.9 CHEST PAIN: Status: RESOLVED | Noted: 2017-03-18 | Resolved: 2017-06-07

## 2017-06-07 PROCEDURE — 3017F COLORECTAL CA SCREEN DOC REV: CPT | Mod: 8P | Performed by: INTERNAL MEDICINE

## 2017-06-07 PROCEDURE — G8432 DEP SCR NOT DOC, RNG: HCPCS | Performed by: INTERNAL MEDICINE

## 2017-06-07 PROCEDURE — 1036F TOBACCO NON-USER: CPT | Performed by: INTERNAL MEDICINE

## 2017-06-07 PROCEDURE — 99213 OFFICE O/P EST LOW 20 MIN: CPT | Performed by: INTERNAL MEDICINE

## 2017-06-07 PROCEDURE — G8417 CALC BMI ABV UP PARAM F/U: HCPCS | Performed by: INTERNAL MEDICINE

## 2017-06-07 ASSESSMENT — ENCOUNTER SYMPTOMS
CHILLS: 0
PALPITATIONS: 0
MYALGIAS: 0
BLURRED VISION: 0
WEAKNESS: 1
ABDOMINAL PAIN: 0
FEVER: 0
DIZZINESS: 1
SHORTNESS OF BREATH: 0
BRUISES/BLEEDS EASILY: 1
LOSS OF CONSCIOUSNESS: 0
PND: 0
INSOMNIA: 0
ORTHOPNEA: 0

## 2017-06-07 NOTE — Clinical Note
"     [facility]   [address]  [place] [address]  Phone: [phone]  Fax: [phone]              Morales Olivier  1955    Encounter Date: 6/7/2017    Sumanth Yancey M.D.          PROGRESS NOTE:  Subjective:   Morales Olivier is a 61 y.o. male who presents today for hospital follow up with history of mitral valve replacement and study result review.    Since the patient's last visit on 04/19/17, he has clinically improved to \"fair\" status. He has mild fatigue, dizziness, weakness and chest wall tenderness. He denies chest pain, palpitations, nausea/vomiting or diaphoresis.    Past Medical History   Diagnosis Date   • Gout    • CAD (coronary artery disease)    • Hypertension    • Atrial fibrillation (CMS-HCC)    • Pacemaker    • Heart valve disease      mitral valve replacement (bovine)   • Renal disorder      kidney stones   • Bronchitis    • Type II or unspecified type diabetes mellitus without mention of complication, not stated as uncontrolled      DM type II- diet controlled   • Personal history of venous thrombosis and embolism 2009     DVT right leg     Past Surgical History   Procedure Laterality Date   • Mitral valve replace  3/14/08     Performed by JEANA MARTELL at SURGERY [facility]   • Maze procedure  3/14/08     Performed by JEANA MARTELL at SURGERY [facility]   • Angiogram  7/3/2009     Performed by MORGAN WARD at SURGERY [facility]   • Angiogram  7/4/2009     Performed by MICHEL URBINA at SURGERY [facility] ORS   • Cath removal  7/4/2009     Performed by MICHEL URBINA at SURGERY [facility]   • Thrombectomy  7/4/2009     Performed by MICHEL URBINA at SURGERY [facility] ORS   • Embolectomy  7/4/2009     Performed by MICHEL URBINA at SURGERY [facility]   • Irrigation & debridement general  7/20/2009     Performed by MICHEL URBINA at [facility]   • Wide excision  7/20/2009  "     Performed by MICHEL URBINA at SURGERY [facility]   • Other cardiac surgery  2008     mitral valve replacement   • Other abdominal surgery       imbulica repair    • Mitral valve replace  3/8/2017     Procedure: MITRAL VALVE REPLACE-REDO;  Surgeon: Cornelia Wright M.D.;  Location: SURGERY [facility];  Service:    • Logan  3/8/2017     Procedure: LOGAN;  Surgeon: Cornelia Wright M.D.;  Location: SURGERY [facility];  Service:      History reviewed. No pertinent family history.  History   Smoking status   • Former Smoker   • Quit date: 01/01/1981   Smokeless tobacco   • Never Used     No Known Allergies    Medications reviewed.    Outpatient Encounter Prescriptions as of 6/7/2017   Medication Sig Dispense Refill   • Acetaminophen (TYLENOL PO) Take  by mouth.     • warfarin (COUMADIN) 5 MG Tab Take 5 mg by mouth every Monday, Wednesday, Friday, and Saturday. Indications: Blood Clot in a Deep Vein, mitral valve replacement     • warfarin (COUMADIN) 2.5 MG Tab Take 2.5 mg by mouth every Tuesday and Thursday. Indications: Blood Clot in a Deep Vein     • metformin (GLUCOPHAGE) 500 MG Tab Take 1 Tab by mouth 2 times a day, with meals. 60 Tab    • tamsulosin (FLOMAX) 0.4 MG capsule Take 0.4 mg by mouth ONE-HALF HOUR AFTER BREAKFAST.     • metoprolol (LOPRESSOR) 25 MG TABS Take 1 Tab by mouth 2 Times a Day. 60 Tab 11   • [DISCONTINUED] amiodarone (PACERONE) 400 MG tablet Take 1 Tab by mouth every day. (Patient taking differently: Take 200 mg by mouth every day.)       No facility-administered encounter medications on file as of 6/7/2017.     Review of Systems   Constitutional: Positive for malaise/fatigue. Negative for fever and chills.   HENT: Negative for congestion.    Eyes: Negative for blurred vision.   Respiratory: Negative for shortness of breath.    Cardiovascular: Negative for chest pain, palpitations, orthopnea, leg swelling and PND.   Gastrointestinal: Negative for abdominal pain.   Genitourinary:  "Negative for dysuria.   Musculoskeletal: Negative for myalgias and joint pain.   Skin: Negative for itching and rash.   Neurological: Positive for dizziness and weakness. Negative for loss of consciousness.   Endo/Heme/Allergies: Bruises/bleeds easily.   Psychiatric/Behavioral: The patient does not have insomnia.         Objective:   /78 mmHg  Pulse 92  Ht 1.778 m (5' 10\")  Wt 110.224 kg (243 lb)  BMI 34.87 kg/m2  SpO2 95%    Physical Exam   Constitutional: He is oriented to person, place, and time. He appears well-developed and well-nourished.   Using cane.   HENT:   Head: Normocephalic and atraumatic.   Eyes: Conjunctivae are normal. Pupils are equal, round, and reactive to light.   Neck: Normal range of motion. Neck supple.   Cardiovascular: Normal rate.  An irregularly irregular rhythm present.   Pulmonary/Chest: Effort normal and breath sounds normal.   Abdominal: Soft. Bowel sounds are normal.   Musculoskeletal: Normal range of motion. He exhibits no edema.   Neurological: He is alert and oriented to person, place, and time.   Skin: Skin is warm and dry. Rash noted.   Psychiatric: He has a normal mood and affect.     CARDIAC STUDIES/PROCEDURES:    CARDIAC CATHETERIZATION CONCLUSIONS by Dr. Kerr (01/08/17)  1.  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  2.  Pulmonary hypertension.  Moderate.  3.  EF 47%.  4.  Patent coronary arteries.    CARDIAC CATHETERIZATION CONCLUSIONS by Dr. Peterson (03/13/08)  3. Essentially normal coronary arterial tree and mild plaquing noted.  4. Unusually small distal half of the left anterior descending coronary artery.    CAROTID ULTRASOUND (03/07/17)  Normal carotids, subclavians and vertebrals    CAROTID ULTRASOUND (11/15/14)  Antegrade flow, bilateral vertebral arteries.   Flow within both subclavian arteries appears to be within normal limits.   Normal bilateral cervical carotid system.    ECHOCARDIOGRAM CONCLUSIONS (03/18/17)  1. Left ventricular " ejection fraction is visually estimated to be 60%.   2. There is apical inferior septal hypokinesis.   3. Known mitral valve bioprosthesis which is functioning normally with   appropriate transvalvular gradient. Mean transvalvular gradient is  5   mmHg at a heart rate of  80 BPM.  4. Right ventricular systolic pressure is estimated to be 25 mmHg.    ECHOCARDIOGRAM CONCLUSIONS (11/15/16)  Prior echocardiogram 5/15/2016. Compared to the report of the study   done - there has been no significant change.   Grossly normal left ventricular systolic function.  Left ventricular ejection fraction is visually estimated to be 50%.  Diastolic function is difficult to assess.   Pacer/ICD wire seen in right ventricle.  Known mitral valve bioprosthesis with probable moderate to severe stenosis.  Moderate tricuspid regurgitation.  Estimated right ventricular systolic pressure is 45 mmHg.    ECHOCARDIOGRAM CONCLUSIONS (05/05/16)  Prior study is available for comparison, 06/30/2015.   Known mitral valve bioprosthesis with probable stenosis based on ASE   criteria ( peak velocity 1.9-2.5 m/s, mean gradient 6-10).  Mean transvalvular gradient is 9 mmHg at a heart rate of  94 BPM.  Peak MV velocity 2.1 m/s Mild mitral regurgitation.  Mild concentric left ventricular hypertrophy.  Low normal left ventricular systolic function.  Left ventricular ejection fraction is visually estimated to be 50%.  Severely dilated left atrium.  Compared to the prior study of 6/3/2015, the prosthetic MV gradients   were actually slightly higher on the prior study.  The curent study is compatible with probable prosthetic valve stenosis   based on ASE criteria.    ECHOCARDIOGRAM CONCLUSIONS (06/30/15)  Normal left ventricular chamber size.   Mild concentric left ventricular hypertrophy.   Mildly reduced left ventricular systolic function.   Left ventricular ejection fraction is 50% to 55%.   Diastolic function is difficult to assess.  Severely dilated right  atrium.   Catheter/pacemaker wire present in the right atrial cavity.  Severely dilated left atrium.  Mitral Valve Replacement. Mean transvalvular gradient is 9-10 mmHg.   Mild to moderate mitral regurgitation. At least moderate mitral stenosis.   Compared to the report of July 2014: the prosthetic mitral valve   stenosis is more severe. Prior mean gradient was 8 mm Hg.    ECHOCARDIOGRAM CONCLUSIONS (07/25/14)  Low normal left ventricular systolic function.  Left ventricular ejection fraction is 50% to 55%.  Bovine bioprosthetic valve with a mean gradient of 8 mmHg.  Moderate mitral regurgitation.  Moderate bioprosthetic valve stenosis.    EKG performed on (04/03/17) was reviewed: EKG shows paced rhythm.  EKG performed on (12/14/16) EKG shows sinus tachycardia.  EKG performed on (11/22/16) EKG shows sinus rhythm.  EKG performed on (01/28/15) EKG shows atrial fibrillation.    Laboratory results of (12/06/16) were reviewed. Cholesterol profile of 167/187/24/105 noted.  Laboratory results of (11/14/14) Cholesterol profile of 134/138/23/83 noted.    TRANSESOPHAGEAL ECHOCARDIOGRAM CONCLUSIONS by Dr. Ball (03/08/17)  1)  Severe MS of BP valve.  2)  Moderate to Severe TR  3)  23mm BPV:  Mean Gradient 3 mmhg.    TRANSESOPHAGEAL ECHOCARDIOGRAM CONCLUSIONS by Dr. Fitch (12/06/16)  Patient in atrial fibrillation during exam.  Grossly normal   biventricular systolic function. Known bioprosthetic mitral valve with   evidence of leaflet thickening and decreased mobility. Severe   bioprosthetic mitral stenosis. Mean gradient 10 mm Hg, valve area by   3-D planimetry is 0.9 cm2.  Moderate-severe tricuspid regurgitation.    Assessment:     1. H/O mitral valve replacement    2. Atrial fibrillation    3. Cardiac pacemaker in situ    4. Chronic anticoagulation    5. Hypertension      Medical Decision Making:  Today's Assessment / Status / Plan:     1. History of mitral valve replacement (29-mm Perimount bioprosthetic valve),  resection of left atrial tumor (thrombus) by Dr. Wright on 03/10/08 and Redo mitral valve replacement (29 mm Medtronic Mosaic porcine valve) again by Dr. Wright on 3/08/17: He is clinically improving. We will repeat an echocardiogram in 9 months.  2. Atrial fibrillation/sick sinus syndrome with pacemaker and chronic anticoagulation therapy (warfarin): Stable on warfarin and metoprolol therapy.  3. Hypertension: Blood pressure is well controlled.  4. History of embolus to the right popliteal artery treated with thrombolytic.    We will follow up in nine month with echocardiogram.    CC [facility]         No Recipients

## 2017-06-07 NOTE — MR AVS SNAPSHOT
"        Morales Olivier   2017 10:20 AM   Office Visit   MRN: [id number]    Department:  [facility]   Dept Phone:  [phone]    Description:  Male : 1955   Provider:  Sumanth Yancey M.D.           Reason for Visit     Follow-Up           Allergies as of 2017     No Known Allergies      You were diagnosed with     H/O mitral valve replacement   [316415]       Atrial fibrillation, unspecified type (CMS-HCC)   [1468085]       Bronchitis   [681213]       Chronic anticoagulation   [584422]       Transient cerebral ischemia, unspecified type   [6410577]         Vital Signs     Blood Pressure Pulse Height Weight Body Mass Index Oxygen Saturation    110/78 mmHg 92 1.778 m (5' 10\") 110.224 kg (243 lb) 34.87 kg/m2 95%    Smoking Status                   Former Smoker           Basic Information     Date Of Birth Sex Race Ethnicity Preferred Language    1955 Male White Non- English      Your appointments     2017 10:20 AM   FOLLOW UP with Sumanth Yancey M.D.   [facility] (--)    [address], [address]  [facility] [address]   [phone]              Problem List              ICD-10-CM Priority Class Noted - Resolved    Open wound T14.8   2009 - Present    GOUT    2009 - Present    Cardiac pacemaker in situ Z95.0   2014 - Present    Rib fracture S22.39XA   2014 - Present    Orbital floor fracture (CMS-HCC) S02.30XA   2014 - Present    TIA (transient ischemic attack) G45.9   2014 - Present    Atrial fibrillation (CMS-HCC) I48.91   2015 - Present    Chronic anticoagulation Z79.01   2015 - Present    H/O mitral valve replacement Z95.2   2015 - Present    COPD exacerbation (CMS-HCC) J44.1   2016 - Present    Bronchitis J40   2016 - Present    Essential hypertension, benign I10   2017 - Present      Health Maintenance        Date Due Completion Dates    DIABETES MONOFILAMENT / LE EXAM " 1955 ---    RETINAL SCREENING 6/23/1972 ---    URINE ACR / MICROALBUMIN 6/23/1972 ---    IMM PNEUMOCOCCAL 19-64 (ADULT) MEDIUM RISK SERIES (1 of 1 - PPSV23) 6/23/1973 ---    COLONOSCOPY 6/23/2004 ---    IMM ZOSTER VACCINE 6/23/2014 ---    A1C SCREENING 9/7/2017 3/7/2017, 1/29/2015, 11/14/2014, 7/8/2014, 3/15/2008, 3/10/2008    FASTING LIPID PROFILE 12/6/2017 12/6/2016, 11/14/2014, 9/14/2009, 7/2/2009, 7/2/2009, 3/11/2008    SERUM CREATININE 4/4/2018 4/4/2017, 4/3/2017, 3/18/2017, 3/12/2017, 3/11/2017, 3/10/2017, 3/9/2017, 3/7/2017, 12/14/2016, 12/6/2016, 11/23/2016, 11/22/2016, 5/17/2015, 1/29/2015, 1/28/2015, 11/15/2014, 11/14/2014, 8/14/2014, 8/1/2014, 7/31/2014, 7/25/2014, 7/24/2014, 7/10/2014, 7/9/2014, 7/8/2014, 7/7/2014, 6/16/2014, 6/14/2014, 6/13/2014, 6/11/2014, 6/6/2014, 6/5/2014, 4/15/2010, 3/18/2010, 9/13/2009, 8/11/2009, 7/30/2009, 7/28/2009, 7/26/2009, 7/24/2009, 7/23/2009, 7/22/2009, 7/21/2009, 7/21/2009, 7/20/2009, 7/11/2009, 7/9/2009, 7/5/2009, 7/4/2009, 7/4/2009, 7/4/2009, 7/2/2009, 7/2/2009, 4/27/2009, 3/20/2009, 1/2/2009, 1/1/2009, 1/1/2009, 9/23/2008, 6/22/2008, 6/21/2008, 6/16/2008, 3/20/2008, 3/17/2008, 3/16/2008, 3/15/2008, 3/15/2008, 3/14/2008, 3/13/2008, 3/12/2008, 3/11/2008, 3/10/2008    IMM DTaP/Tdap/Td Vaccine (2 - Td) 7/31/2024 7/31/2014            Current Immunizations     Tdap Vaccine 7/31/2014  3:29 PM      Below and/or attached are the medications your provider expects you to take. Review all of your home medications and newly ordered medications with your provider and/or pharmacist. Follow medication instructions as directed by your provider and/or pharmacist. Please keep your medication list with you and share with your provider. Update the information when medications are discontinued, doses are changed, or new medications (including over-the-counter products) are added; and carry medication information at all times in the event of emergency situations     Allergies:  No  Known Allergies          Medications  Valid as of: June 07, 2017 - 10:11 AM    Generic Name Brand Name Tablet Size Instructions for use    Acetaminophen   Take  by mouth.        MetFORMIN HCl (Tab) GLUCOPHAGE 500 MG Take 1 Tab by mouth 2 times a day, with meals.        Metoprolol Tartrate (Tab) LOPRESSOR 25 MG Take 1 Tab by mouth 2 Times a Day.        Tamsulosin HCl (Cap) FLOMAX 0.4 MG Take 0.4 mg by mouth ONE-HALF HOUR AFTER BREAKFAST.        Warfarin Sodium (Tab) COUMADIN 5 MG Take 5 mg by mouth every Monday, Wednesday, Friday, and Saturday. Indications: Blood Clot in a Deep Vein, mitral valve replacement        Warfarin Sodium (Tab) COUMADIN 2.5 MG Take 2.5 mg by mouth every Tuesday and Thursday. Indications: Blood Clot in a Deep Vein        .                 Medicines prescribed today were sent to:     [facility] [address]    [address] [place] [address]    Phone: [phone] Fax: [phone]    Open 24 Hours?: No      Medication refill instructions:       If your prescription bottle indicates you have medication refills left, it is not necessary to call your provider’s office. Please contact your pharmacy and they will refill your medication.    If your prescription bottle indicates you do not have any refills left, you may request refills at any time through one of the following ways: The online MD Lingo system (except Urgent Care), by calling your provider’s office, or by asking your pharmacy to contact your provider’s office with a refill request. Medication refills are processed only during regular business hours and may not be available until the next business day. Your provider may request additional information or to have a follow-up visit with you prior to refilling your medication.   *Please Note: Medication refills are assigned a new Rx number when refilled electronically. Your pharmacy may indicate that no refills were authorized even though a new prescription for the same  medication is available at the pharmacy. Please request the medicine by name with the pharmacy before contacting your provider for a refill.        Your To Do List     Future Labs/Procedures Complete By Expires    ECHOCARDIOGRAM COMP W/O CONT  As directed 6/8/2018         MyChart Status: Patient Declined

## 2017-06-07 NOTE — PROGRESS NOTES
"Subjective:   Morales Olivier is a 61 y.o. male who presents today for hospital follow up with history of mitral valve replacement and study result review.    Since the patient's last visit on 04/19/17, he has clinically improved to \"fair\" status. He has mild fatigue, dizziness, weakness and chest wall tenderness. He denies chest pain, palpitations, nausea/vomiting or diaphoresis.    Past Medical History   Diagnosis Date   • Gout    • CAD (coronary artery disease)    • Hypertension    • Atrial fibrillation (CMS-HCC)    • Pacemaker    • Heart valve disease      mitral valve replacement (bovine)   • Renal disorder      kidney stones   • Bronchitis    • Type II or unspecified type diabetes mellitus without mention of complication, not stated as uncontrolled      DM type II- diet controlled   • Personal history of venous thrombosis and embolism 2009     DVT right leg     Past Surgical History   Procedure Laterality Date   • Mitral valve replace  3/14/08     Performed by JEANA MARTELL at SURGERY [facility]   • Maze procedure  3/14/08     Performed by JEANA MARTELL at [facility]   • Angiogram  7/3/2009     Performed by MORGAN WARD at SURGERY [facility]   • Angiogram  7/4/2009     Performed by MICHEL URBINA at SURGERY [facility]   • Cath removal  7/4/2009     Performed by MICHEL URBINA at [facility]   • Thrombectomy  7/4/2009     Performed by MICHEL URBINA at [facility]   • Embolectomy  7/4/2009     Performed by MICHEL URBINA at [facility]   • Irrigation & debridement general  7/20/2009     Performed by MICHEL URBINA at [facility]   • Wide excision  7/20/2009     Performed by MICHEL URBINA at [facility]   • Other cardiac surgery  2008     mitral valve replacement   • Other abdominal surgery       imbulica repair    • Mitral valve replace  3/8/2017     Procedure: MITRAL VALVE REPLACE-REDO;  " Surgeon: Cornelia Wright M.D.;  Location: SURGERY [facility];  Service:    • Logan  3/8/2017     Procedure: LOGAN;  Surgeon: Cornelia Wright M.D.;  Location: SURGERY [facility];  Service:      History reviewed. No pertinent family history.  History   Smoking status   • Former Smoker   • Quit date: 01/01/1981   Smokeless tobacco   • Never Used     No Known Allergies    Medications reviewed.    Outpatient Encounter Prescriptions as of 6/7/2017   Medication Sig Dispense Refill   • Acetaminophen (TYLENOL PO) Take  by mouth.     • warfarin (COUMADIN) 5 MG Tab Take 5 mg by mouth every Monday, Wednesday, Friday, and Saturday. Indications: Blood Clot in a Deep Vein, mitral valve replacement     • warfarin (COUMADIN) 2.5 MG Tab Take 2.5 mg by mouth every Tuesday and Thursday. Indications: Blood Clot in a Deep Vein     • metformin (GLUCOPHAGE) 500 MG Tab Take 1 Tab by mouth 2 times a day, with meals. 60 Tab    • tamsulosin (FLOMAX) 0.4 MG capsule Take 0.4 mg by mouth ONE-HALF HOUR AFTER BREAKFAST.     • metoprolol (LOPRESSOR) 25 MG TABS Take 1 Tab by mouth 2 Times a Day. 60 Tab 11   • [DISCONTINUED] amiodarone (PACERONE) 400 MG tablet Take 1 Tab by mouth every day. (Patient taking differently: Take 200 mg by mouth every day.)       No facility-administered encounter medications on file as of 6/7/2017.     Review of Systems   Constitutional: Positive for malaise/fatigue. Negative for fever and chills.   HENT: Negative for congestion.    Eyes: Negative for blurred vision.   Respiratory: Negative for shortness of breath.    Cardiovascular: Negative for chest pain, palpitations, orthopnea, leg swelling and PND.   Gastrointestinal: Negative for abdominal pain.   Genitourinary: Negative for dysuria.   Musculoskeletal: Negative for myalgias and joint pain.   Skin: Negative for itching and rash.   Neurological: Positive for dizziness and weakness. Negative for loss of consciousness.   Endo/Heme/Allergies: Bruises/bleeds easily.  "  Psychiatric/Behavioral: The patient does not have insomnia.         Objective:   /78 mmHg  Pulse 92  Ht 1.778 m (5' 10\")  Wt 110.224 kg (243 lb)  BMI 34.87 kg/m2  SpO2 95%    Physical Exam   Constitutional: He is oriented to person, place, and time. He appears well-developed and well-nourished.   Using cane.   HENT:   Head: Normocephalic and atraumatic.   Eyes: Conjunctivae are normal. Pupils are equal, round, and reactive to light.   Neck: Normal range of motion. Neck supple.   Cardiovascular: Normal rate.  An irregularly irregular rhythm present.   Pulmonary/Chest: Effort normal and breath sounds normal.   Abdominal: Soft. Bowel sounds are normal.   Musculoskeletal: Normal range of motion. He exhibits no edema.   Neurological: He is alert and oriented to person, place, and time.   Skin: Skin is warm and dry. Rash noted.   Psychiatric: He has a normal mood and affect.     CARDIAC STUDIES/PROCEDURES:    CARDIAC CATHETERIZATION CONCLUSIONS by Dr. Kerr (01/08/17)  1.  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  2.  Pulmonary hypertension.  Moderate.  3.  EF 47%.  4.  Patent coronary arteries.    CARDIAC CATHETERIZATION CONCLUSIONS by Dr. Peterson (03/13/08)  3. Essentially normal coronary arterial tree and mild plaquing noted.  4. Unusually small distal half of the left anterior descending coronary artery.    CAROTID ULTRASOUND (03/07/17)  Normal carotids, subclavians and vertebrals    CAROTID ULTRASOUND (11/15/14)  Antegrade flow, bilateral vertebral arteries.   Flow within both subclavian arteries appears to be within normal limits.   Normal bilateral cervical carotid system.    ECHOCARDIOGRAM CONCLUSIONS (03/18/17)  1. Left ventricular ejection fraction is visually estimated to be 60%.   2. There is apical inferior septal hypokinesis.   3. Known mitral valve bioprosthesis which is functioning normally with   appropriate transvalvular gradient. Mean transvalvular gradient is  5   mmHg " at a heart rate of  80 BPM.  4. Right ventricular systolic pressure is estimated to be 25 mmHg.    ECHOCARDIOGRAM CONCLUSIONS (11/15/16)  Prior echocardiogram 5/15/2016. Compared to the report of the study   done - there has been no significant change.   Grossly normal left ventricular systolic function.  Left ventricular ejection fraction is visually estimated to be 50%.  Diastolic function is difficult to assess.   Pacer/ICD wire seen in right ventricle.  Known mitral valve bioprosthesis with probable moderate to severe stenosis.  Moderate tricuspid regurgitation.  Estimated right ventricular systolic pressure is 45 mmHg.    ECHOCARDIOGRAM CONCLUSIONS (05/05/16)  Prior study is available for comparison, 06/30/2015.   Known mitral valve bioprosthesis with probable stenosis based on ASE   criteria ( peak velocity 1.9-2.5 m/s, mean gradient 6-10).  Mean transvalvular gradient is 9 mmHg at a heart rate of  94 BPM.  Peak MV velocity 2.1 m/s Mild mitral regurgitation.  Mild concentric left ventricular hypertrophy.  Low normal left ventricular systolic function.  Left ventricular ejection fraction is visually estimated to be 50%.  Severely dilated left atrium.  Compared to the prior study of 6/3/2015, the prosthetic MV gradients   were actually slightly higher on the prior study.  The curent study is compatible with probable prosthetic valve stenosis   based on ASE criteria.    ECHOCARDIOGRAM CONCLUSIONS (06/30/15)  Normal left ventricular chamber size.   Mild concentric left ventricular hypertrophy.   Mildly reduced left ventricular systolic function.   Left ventricular ejection fraction is 50% to 55%.   Diastolic function is difficult to assess.  Severely dilated right atrium.   Catheter/pacemaker wire present in the right atrial cavity.  Severely dilated left atrium.  Mitral Valve Replacement. Mean transvalvular gradient is 9-10 mmHg.   Mild to moderate mitral regurgitation. At least moderate mitral stenosis.    Compared to the report of July 2014: the prosthetic mitral valve   stenosis is more severe. Prior mean gradient was 8 mm Hg.    ECHOCARDIOGRAM CONCLUSIONS (07/25/14)  Low normal left ventricular systolic function.  Left ventricular ejection fraction is 50% to 55%.  Bovine bioprosthetic valve with a mean gradient of 8 mmHg.  Moderate mitral regurgitation.  Moderate bioprosthetic valve stenosis.    EKG performed on (04/03/17) was reviewed: EKG shows paced rhythm.  EKG performed on (12/14/16) EKG shows sinus tachycardia.  EKG performed on (11/22/16) EKG shows sinus rhythm.  EKG performed on (01/28/15) EKG shows atrial fibrillation.    Laboratory results of (12/06/16) were reviewed. Cholesterol profile of 167/187/24/105 noted.  Laboratory results of (11/14/14) Cholesterol profile of 134/138/23/83 noted.    TRANSESOPHAGEAL ECHOCARDIOGRAM CONCLUSIONS by Dr. Ball (03/08/17)  1)  Severe MS of BP valve.  2)  Moderate to Severe TR  3)  23mm BPV:  Mean Gradient 3 mmhg.    TRANSESOPHAGEAL ECHOCARDIOGRAM CONCLUSIONS by Dr. Fitch (12/06/16)  Patient in atrial fibrillation during exam.  Grossly normal   biventricular systolic function. Known bioprosthetic mitral valve with   evidence of leaflet thickening and decreased mobility. Severe   bioprosthetic mitral stenosis. Mean gradient 10 mm Hg, valve area by   3-D planimetry is 0.9 cm2.  Moderate-severe tricuspid regurgitation.    Assessment:     1. H/O mitral valve replacement    2. Atrial fibrillation    3. Cardiac pacemaker in situ    4. Chronic anticoagulation    5. Hypertension      Medical Decision Making:  Today's Assessment / Status / Plan:     1. History of mitral valve replacement (29-mm Perimount bioprosthetic valve), resection of left atrial tumor (thrombus) by Dr. Wright on 03/10/08 and Redo mitral valve replacement (29 mm Medtronic Mosaic porcine valve) again by Dr. Wright on 3/08/17: He is clinically improving. We will repeat an echocardiogram in 9 months.  2.  Atrial fibrillation/sick sinus syndrome with pacemaker and chronic anticoagulation therapy (warfarin): Stable on warfarin and metoprolol therapy.  3. Hypertension: Blood pressure is well controlled.  4. History of embolus to the right popliteal artery treated with thrombolytic.    We will follow up in nine month with echocardiogram.    CC ALYSSAR

## 2017-07-15 ENCOUNTER — HOSPITAL ENCOUNTER (EMERGENCY)
Facility: MEDICAL CENTER | Age: 62
End: 2017-07-15
Attending: EMERGENCY MEDICINE
Payer: MEDICARE

## 2017-07-15 VITALS
TEMPERATURE: 98.9 F | RESPIRATION RATE: 18 BRPM | HEIGHT: 70 IN | DIASTOLIC BLOOD PRESSURE: 100 MMHG | HEART RATE: 97 BPM | WEIGHT: 240.96 LBS | BODY MASS INDEX: 34.5 KG/M2 | SYSTOLIC BLOOD PRESSURE: 139 MMHG | OXYGEN SATURATION: 98 %

## 2017-07-15 DIAGNOSIS — M25.432 SWELLING OF JOINT, WRIST, LEFT: ICD-10-CM

## 2017-07-15 PROCEDURE — 700111 HCHG RX REV CODE 636 W/ 250 OVERRIDE (IP): Performed by: EMERGENCY MEDICINE

## 2017-07-15 PROCEDURE — A9270 NON-COVERED ITEM OR SERVICE: HCPCS | Performed by: EMERGENCY MEDICINE

## 2017-07-15 PROCEDURE — 700102 HCHG RX REV CODE 250 W/ 637 OVERRIDE(OP): Performed by: EMERGENCY MEDICINE

## 2017-07-15 PROCEDURE — 99284 EMERGENCY DEPT VISIT MOD MDM: CPT

## 2017-07-15 RX ORDER — METHYLPREDNISOLONE 4 MG/1
4 TABLET ORAL DAILY
Qty: 30 TAB | Refills: 0 | Status: SHIPPED | OUTPATIENT
Start: 2017-07-15 | End: 2018-02-24

## 2017-07-15 RX ORDER — PREDNISONE 20 MG/1
60 TABLET ORAL ONCE
Status: COMPLETED | OUTPATIENT
Start: 2017-07-15 | End: 2017-07-15

## 2017-07-15 RX ORDER — HYDROCODONE BITARTRATE AND ACETAMINOPHEN 5; 325 MG/1; MG/1
1 TABLET ORAL ONCE
Status: COMPLETED | OUTPATIENT
Start: 2017-07-15 | End: 2017-07-15

## 2017-07-15 RX ORDER — PREDNISONE 20 MG/1
60 TABLET ORAL DAILY
Qty: 15 TAB | Refills: 0 | Status: SHIPPED | OUTPATIENT
Start: 2017-07-15 | End: 2017-07-20

## 2017-07-15 RX ADMIN — PREDNISONE 60 MG: 20 TABLET ORAL at 12:41

## 2017-07-15 RX ADMIN — HYDROCODONE BITARTRATE AND ACETAMINOPHEN 1 TABLET: 5; 325 TABLET ORAL at 12:41

## 2017-07-15 ASSESSMENT — PAIN SCALES - GENERAL: PAINLEVEL_OUTOF10: 10

## 2017-07-15 NOTE — ED NOTES
Placed velco laceup wrist splint on right wrist and then placed pt in sling for right arm, pt tolerated well.

## 2017-07-15 NOTE — ED AVS SNAPSHOT
7/15/2017    Morales Olivier  [address]  [place] [address]    Dear Morales:    [facility] wants to ensure your discharge home is safe and you or your loved ones have had all of your questions answered regarding your care after you leave the hospital.    Below is a list of resources and contact information should you have any questions regarding your hospital stay, follow-up instructions, or active medical symptoms.    Questions or Concerns Regarding… Contact   Medical Questions Related to Your Discharge  (7 days a week, 8am-5pm) Contact a Nurse Care Coordinator   [phone]   Medical Questions Not Related to Your Discharge  (24 hours a day / 7 days a week)  Contact the Nurse Health Line   [phone]    Medications or Discharge Instructions Refer to your discharge packet   or contact your [facility] Primary Care Provider   [phone]   Follow-up Appointment(s) Schedule your appointment via Appcelerator   or contact Scheduling [phone]   Billing Review your statement via Appcelerator  or contact Billing [phone]   Medical Records Review your records via Appcelerator   or contact Medical Records [phone]     You may receive a telephone call within two days of discharge. This call is to make certain you understand your discharge instructions and have the opportunity to have any questions answered. You can also easily access your medical information, test results and upcoming appointments via the Appcelerator free online health management tool. You can learn more and sign up at [email or web address]. For assistance setting up your Appcelerator account, please call [phone].    Once again, we want to ensure your discharge home is safe and that you have a clear understanding of any next steps in your care. If you have any questions or concerns, please do not hesitate to contact us, we are here for you. Thank you for choosing [facility] for your healthcare needs.    Sincerely,    Your [facility] Healthcare Team

## 2017-07-15 NOTE — ED AVS SNAPSHOT
SwiftStack Access Code: Activation code not generated  Current SwiftStack Status: Patient Declined    Your email address is not on file at [facility].  Email Addresses are required for you to sign up for SwiftStack, please contact [phone] to verify your personal information and to provide your email address prior to attempting to register for SwiftStack.    Morales Olivier  [address]  [place] [address]    [employer] Powerhart  A secure, online tool to manage your health information     [facility]’s SwiftStack® is a secure, online tool that connects you to your personalized health information from the privacy of your home -- day or night - making it very easy for you to manage your healthcare. Once the activation process is completed, you can even access your medical information using the SwiftStack moreno, which is available for free in the Apple Moreno store or Google Play store.     To learn more about SwiftStack, visit [email or web address]    There are two levels of access available (as shown below):   My Chart Features  [facility] Primary Care Doctor [facility]  Specialists [facility]  Urgent  Care Non-[facility] Primary Care Doctor   Email your healthcare team securely and privately 24/7 X X X    Manage appointments: schedule your next appointment; view details of past/upcoming appointments X      Request prescription refills. X      View recent personal medical records, including lab and immunizations X X X X   View health record, including health history, allergies, medications X X X X   Read reports about your outpatient visits, procedures, consult and ER notes X X X X   See your discharge summary, which is a recap of your hospital and/or ER visit that includes your diagnosis, lab results, and care plan X X  X     How to register for BreakingPoint Systemst:  Once your e-mail address has been verified, follow the following steps to sign up for BreakingPoint Systemst.     1. Go to  [email or web address]  2. Click on the Sign Up Now box, which takes you to the New Member Sign Up  page. You will need to provide the following information:  a. Enter your Flipzu Access Code exactly as it appears at the top of this page. (You will not need to use this code after you’ve completed the sign-up process. If you do not sign up before the expiration date, you must request a new code.)   b. Enter your date of birth.   c. Enter your home email address.   d. Click Submit, and follow the next screen’s instructions.  3. Create a Flipzu ID. This will be your Flipzu login ID and cannot be changed, so think of one that is secure and easy to remember.  4. Create a Flipzu password. You can change your password at any time.  5. Enter your Password Reset Question and Answer. This can be used at a later time if you forget your password.   6. Enter your e-mail address. This allows you to receive e-mail notifications when new information is available in Flipzu.  7. Click Sign Up. You can now view your health information.    For assistance activating your Flipzu account, call [phone]

## 2017-07-15 NOTE — ED PROVIDER NOTES
ED Provider Note    Scribed for Pratima Triplett M.D. by Smita Castillo. 7/15/2017, 12:34 PM.    Primary care provider: Nelia Huber M.D.  Means of arrival: EMS  History obtained from: Patient   History limited by: None     CHIEF COMPLAINT  Chief Complaint   Patient presents with   • Wrist Pain     right wrist with swelling x 1 week     HPI  Morales Olivier is a 62 y.o. male who presents to the Emergency Department by EMS for right wrist pain and swelling onset one week ago. He notes that movement exacerbates his wrist pain. The patient was seen at the [facility] yesterday and prescribed a treatment for arthritis, however, when the patient tried to fill the prescription it was over $200 and he could not afford it.  The patient denies any numbness or tingling, fevers or chills. He notes that he has experienced arthritis and gout pain in the past and this feels similar.     REVIEW OF SYSTEMS  Pertinent positives include right wrist pain and swelling. Pertinent negatives include no numbness, tingling, fevers, chills. E      PAST MEDICAL HISTORY   has a past medical history of Gout; CAD (coronary artery disease); Hypertension; Atrial fibrillation (CMS-[facility]); Pacemaker; Heart valve disease; Renal disorder; Bronchitis; Type II or unspecified type diabetes mellitus without mention of complication, not stated as uncontrolled; and Personal history of venous thrombosis and embolism (2009).    SURGICAL HISTORY   has past surgical history that includes mitral valve replace (3/14/08); maze procedure (3/14/08); angiogram (7/3/2009); angiogram (7/4/2009); cath removal (7/4/2009); thrombectomy (7/4/2009); embolectomy (7/4/2009); irrigation & debridement general (7/20/2009); wide excision (7/20/2009); other cardiac surgery (2008); other abdominal surgery; mitral valve replace (3/8/2017); and lopez (3/8/2017).    SOCIAL HISTORY  Social History   Substance Use Topics   • Smoking status: Former Smoker     Quit date:  "01/01/1981   • Smokeless tobacco: Never Used   • Alcohol Use: No      History   Drug Use No     FAMILY HISTORY  No family history noted    CURRENT MEDICATIONS  No current facility-administered medications on file prior to encounter.     Current Outpatient Prescriptions on File Prior to Encounter   Medication Sig Dispense Refill   • Acetaminophen (TYLENOL PO) Take  by mouth.     • warfarin (COUMADIN) 5 MG Tab Take 5 mg by mouth every Monday, Wednesday, Friday, and Saturday. Indications: Blood Clot in a Deep Vein, mitral valve replacement     • warfarin (COUMADIN) 2.5 MG Tab Take 2.5 mg by mouth every Tuesday and Thursday. Indications: Blood Clot in a Deep Vein     • metformin (GLUCOPHAGE) 500 MG Tab Take 1 Tab by mouth 2 times a day, with meals. 60 Tab    • tamsulosin (FLOMAX) 0.4 MG capsule Take 0.4 mg by mouth ONE-HALF HOUR AFTER BREAKFAST.     • metoprolol (LOPRESSOR) 25 MG TABS Take 1 Tab by mouth 2 Times a Day. 60 Tab 11     ALLERGIES  No Known Allergies    PHYSICAL EXAM  VITAL SIGNS: /100 mmHg  Pulse 97  Temp(Src) 37.2 °C (98.9 °F) (Temporal)  Resp 18  Ht 1.778 m (5' 10\")  Wt 109.3 kg (240 lb 15.4 oz)  BMI 34.57 kg/m2  SpO2 98%    Constitutional:  Alert and in no apparent distress.  HENT: Normocephalic, Bilateral external ears normal. Nose normal.   Eyes: Pupils are equal and reactive. Conjunctiva normal, non-icteric.   Cardiovascular:  Good Perfusion  Thorax & Lungs: Easy unlabored respirations  Abdomen:  No pain with movement   Skin: Visualized skin is  Dry, No erythema, No rash.   Extremities:   Moves all extremities Right wrist swelling, no warmth or erythema.   Neurologic: Alert, Grossly non-focal.   Psychiatric: Appropriate Mood    COURSE & MEDICAL DECISION MAKING  Nursing notes and vital signs were reviewed. (See chart for details)  The patient's  records were reviewed, history was obtained from the patient ;     The patient presents with right wrist pain and swell, and the differential " "diagnosis includes but is not limited to arthritis/gout. The patient shows no signs of a septic joint. He will be treated with steroids to help the inflammation and not interfere with his Warfarin.  The patient was advised to use a splint and sling to help allow his wrist to rest.     12:34 PM Initial treatment in the Emergency Department included Deltasone 60mg tablet and Norco 5-325mg tablet. I discussed plans for discharge with a prescription for Medrol 4mg tablet and Deltasone 20mg tablet and splint/sling application. He was given a referral to follow up with his primary care provider and instructed to return to the ED if his symptoms worsen. Patient understands and agrees. His vitals prior to discharge are: /100 mmHg  Pulse 97  Temp(Src) 37.2 °C (98.9 °F) (Temporal)  Resp 18  Ht 1.778 m (5' 10\")  Wt 109.3 kg (240 lb 15.4 oz)  BMI 34.57 kg/m2  SpO2 98%    No ED testing was performed     The patient was discharged home with an information sheet on wrist pain and told to return immediately for any signs or symptoms listed, but specifically if his wrist becomes red and hot.  The patient agreed to the discharge precautions and follow-up plan which is documented in EPIC.    DISPOSITION:  Patient will be discharged home in stable condition.    FOLLOW UP:  Mark Lares M.D.  [address]  [address]  [facility] [address]  [phone]    Schedule an appointment as soon as possible for a visit in 2 days  If symptoms worsen, fever or worsening return to ED    [facility]  [address] [address]  [phone]    Schedule an appointment as soon as possible for a visit  on Monday if need help with medication      OUTPATIENT MEDICATIONS:  Discharge Medication List as of 7/15/2017 12:57 PM      START taking these medications    Details   methylPREDNISolone (MEDROL) 4 MG Tab Take 1 Tab by mouth every day., Disp-30 Tab, R-0, Normal      predniSONE (DELTASONE) 20 MG Tab Take 3 Tabs by " mouth every day for 5 days., Disp-15 Tab, R-0, Print Rx Paper             FINAL IMPRESSION  1. Swelling of joint, wrist, left          I, Smita Castillo (Scribe), am scribing for, and in the presence of, Pratima Triplett M.D..    Electronically signed by: Smita Castillo (Scribe), 7/15/2017    I, Pratima Triplett M.D. personally performed the services described in this documentation, as scribed by Smita Castillo in my presence, and it is both accurate and complete.    The note accurately reflects work and decisions made by me.  Pratima Triplett  7/15/2017  5:36 PM

## 2017-07-15 NOTE — ED AVS SNAPSHOT
Home Care Instructions                                                                                                                Morales Olivier   MRN: [id number]    Department:  [facility], Emergency Dept   Date of Visit:  7/15/2017            [facility], Emergency Dept    [address] [facility] [address]    Phone:  [phone]      You were seen by     Pratima Triplett M.D.      Your Diagnosis Was     Swelling of joint, wrist, left     M25.432       These are the medications you received during your hospitalization from 07/15/2017 1214 to 07/15/2017 1257     Date/Time Order Dose Route Action    07/15/2017 1241 predniSONE (DELTASONE) tablet 60 mg 60 mg Oral Given    07/15/2017 1241 hydrocodone-acetaminophen (NORCO) 5-325 MG per tablet 1 Tab 1 Tab Oral Given      Follow-up Information     1. Follow up with Mark Lares M.D.. Schedule an appointment as soon as possible for a visit in 2 days.    Specialty:  Orthopaedics    Why:  If symptoms worsen, fever or worsening return to ED    Contact information    [address]  [address]  [facility] [address]  [phone]          2. Schedule an appointment as soon as possible for a visit with [facility].    Why:  on Monday if need help with medication    Contact information    [address] [facility] [address]  [phone]        Medication Information     Review all of your home medications and newly ordered medications with your primary doctor and/or pharmacist as soon as possible. Follow medication instructions as directed by your doctor and/or pharmacist.     Please keep your complete medication list with you and share with your physician. Update the information when medications are discontinued, doses are changed, or new medications (including over-the-counter products) are added; and carry medication information at all times in the event of emergency situations.               Medication List         START taking these medications        Instructions    Morning Afternoon Evening Bedtime    methylPREDNISolone 4 MG Tabs   Commonly known as:  MEDROL        Take 1 Tab by mouth every day.   Dose:  4 mg                        predniSONE 20 MG Tabs   Last time this was given:  60 mg on 7/15/2017 12:41 PM   Commonly known as:  DELTASONE        Take 3 Tabs by mouth every day for 5 days.   Dose:  60 mg                          ASK your doctor about these medications        Instructions    Morning Afternoon Evening Bedtime    metformin 500 MG Tabs   Commonly known as:  GLUCOPHAGE        Take 1 Tab by mouth 2 times a day, with meals.   Dose:  500 mg                        metoprolol 25 MG Tabs   Commonly known as:  LOPRESSOR        Take 1 Tab by mouth 2 Times a Day.   Dose:  25 mg                        tamsulosin 0.4 MG capsule   Commonly known as:  FLOMAX        Take 0.4 mg by mouth ONE-HALF HOUR AFTER BREAKFAST.   Dose:  0.4 mg                        TYLENOL PO        Take  by mouth.                        * warfarin 5 MG Tabs   Commonly known as:  COUMADIN        Take 5 mg by mouth every Monday, Wednesday, Friday, and Saturday. Indications: Blood Clot in a Deep Vein, mitral valve replacement   Dose:  5 mg                        * warfarin 2.5 MG Tabs   Commonly known as:  COUMADIN        Take 2.5 mg by mouth every Tuesday and Thursday. Indications: Blood Clot in a Deep Vein   Dose:  2.5 mg                        * Notice:  This list has 2 medication(s) that are the same as other medications prescribed for you. Read the directions carefully, and ask your doctor or other care provider to review them with you.         Where to Get Your Medications      These medications were sent to [facility] [address]  [address] [id number]     Phone:  [phone]    - methylPREDNISolone 4 MG Tabs      You can get these medications from any pharmacy     Bring a paper prescription for each of these  medications    - predniSONE 20 MG Tabs            Procedures and tests performed during your visit     SLING    SPLINT APPLICATION        Discharge Instructions       Wrist Pain  Wrist injuries are frequent in adults and children. A sprain is an injury to the ligaments that hold your bones together. A strain is an injury to muscle or muscle cord-like structures (tendons) from stretching or pulling. Generally, when wrists are moderately tender to touch following a fall or injury, a break in the bone (fracture) may be present. Most wrist sprains or strains are better in 3 to 5 days, but complete healing may take several weeks.  HOME CARE INSTRUCTIONS   · Put ice on the injured area.  ¨ Put ice in a plastic bag.  ¨ Place a towel between your skin and the bag.  ¨ Leave the ice on for 15-20 minutes, 3-4 times a day, for the first 2 days, or as directed by your health care provider.  · Keep your arm raised above the level of your heart whenever possible to reduce swelling and pain.  · Rest the injured area for at least 48 hours or as directed by your health care provider.  · If a splint or elastic bandage has been applied, use it for as long as directed by your health care provider or until seen by a health care provider for a follow-up exam.  · Only take over-the-counter or prescription medicines for pain, discomfort, or fever as directed by your health care provider.  · Keep all follow-up appointments. You may need to follow up with a specialist or have follow-up X-rays. Improvement in pain level is not a guarantee that you did not fracture a bone in your wrist. The only way to determine whether or not you have a broken bone is by X-ray.  SEEK IMMEDIATE MEDICAL CARE IF:   · Your fingers are swollen, very red, white, or cold and blue.  · Your fingers are numb or tingling.  · You have increasing pain.  · You have difficulty moving your fingers.  MAKE SURE YOU:   · Understand these instructions.  · Will watch your  condition.  · Will get help right away if you are not doing well or get worse.     This information is not intended to replace advice given to you by your health care provider. Make sure you discuss any questions you have with your health care provider.     Document Released: 09/27/2006 Document Revised: 12/23/2014 Document Reviewed: 02/08/2012  [employer] Patient Education ©2016 [employer]            Patient Information     Patient Information    Following emergency treatment: all patient requiring follow-up care must return either to a private physician or a clinic if your condition worsens before you are able to obtain further medical attention, please return to the emergency room.     Billing Information    At [facility], we work to make the billing process streamlined for our patients.  Our Representatives are here to answer any questions you may have regarding your hospital bill.  If you have insurance coverage and have supplied your insurance information to us, we will submit a claim to your insurer on your behalf.  Should you have any questions regarding your bill, we can be reached online or by phone as follows:  Online: You are able pay your bills online or live chat with our representatives about any billing questions you may have. We are here to help Monday - Friday from 8:00am to 7:30pm and 9:00am - 12:00pm on Saturdays.  Please visit [email or web address]  for more information.   Phone:  [phone] or [phone]    Please note that your emergency physician, surgeon, pathologist, radiologist, anesthesiologist, and other specialists are not employed by [facility] and will therefore bill separately for their services.  Please contact them directly for any questions concerning their bills at the numbers below:     Emergency Physician Services:  [phone]  [facility]:  [phone]  [facility]:  [phone]  [facility]:  [phone]    1. Your final bill may vary from the amount quoted upon discharge if all procedures are not complete at that time, or if your doctor has additional procedures of which we are not aware. You will receive an additional bill if you return to the Emergency Department at [facility] for suture removal regardless of the facility of which the sutures were placed.     2. Please arrange for settlement of this account at the emergency registration.    3. All self-pay accounts are due in full at the time of treatment.  If you are unable to meet this obligation then payment is expected within 4-5 days.     4. If you have had radiology studies (CT, X-ray, Ultrasound, MRI), you have received a preliminary result during your emergency department visit. Please contact the radiology department [phone] to receive a copy of your final result. Please discuss the Final result with your primary physician or with the follow up physician provided.     Crisis Hotline:  [place] Crisis Hotline:  9-780-SGGHQTM or 1-598.405.2020  [place] Crisis Hotline:    1-978.559.3271 or 149-660-2916         ED Discharge Follow Up Questions    1. In order to provide you with very good care, we would like to follow up with a phone call in the next few days.  May we have your permission to contact you?     YES /  NO    2. What is the best phone number to call you? (       )_____-__________    3. What is the best time to call you?      Morning  /  Afternoon  /  Evening                   Patient Signature:  ____________________________________________________________    Date:  ____________________________________________________________

## 2017-07-16 ENCOUNTER — PATIENT OUTREACH (OUTPATIENT)
Dept: HEALTH INFORMATION MANAGEMENT | Facility: OTHER | Age: 62
End: 2017-07-16

## 2017-08-30 ENCOUNTER — HOSPITAL ENCOUNTER (OUTPATIENT)
Dept: LAB | Facility: MEDICAL CENTER | Age: 62
End: 2017-08-30
Attending: FAMILY MEDICINE
Payer: MEDICARE

## 2017-08-30 LAB
INR PPP: 2.98 (ref 0.87–1.13)
PROTHROMBIN TIME: 31.9 SEC (ref 12–14.6)

## 2017-08-30 PROCEDURE — 36415 COLL VENOUS BLD VENIPUNCTURE: CPT | Mod: GA

## 2017-08-30 PROCEDURE — 85610 PROTHROMBIN TIME: CPT | Mod: GA

## 2018-02-24 ENCOUNTER — HOSPITAL ENCOUNTER (OUTPATIENT)
Facility: MEDICAL CENTER | Age: 63
End: 2018-02-25
Attending: EMERGENCY MEDICINE | Admitting: HOSPITALIST
Payer: MEDICARE

## 2018-02-24 ENCOUNTER — APPOINTMENT (OUTPATIENT)
Dept: RADIOLOGY | Facility: MEDICAL CENTER | Age: 63
End: 2018-02-24
Attending: EMERGENCY MEDICINE
Payer: MEDICARE

## 2018-02-24 DIAGNOSIS — R07.9 CHEST PAIN, UNSPECIFIED TYPE: ICD-10-CM

## 2018-02-24 DIAGNOSIS — Z95.2 H/O MITRAL VALVE REPLACEMENT: ICD-10-CM

## 2018-02-24 DIAGNOSIS — M10.9 ACUTE GOUT INVOLVING TOE OF LEFT FOOT, UNSPECIFIED CAUSE: ICD-10-CM

## 2018-02-24 LAB
ALBUMIN SERPL BCP-MCNC: 3.9 G/DL (ref 3.2–4.9)
ALBUMIN/GLOB SERPL: 1.6 G/DL
ALP SERPL-CCNC: 65 U/L (ref 30–99)
ALT SERPL-CCNC: 5 U/L (ref 2–50)
ANION GAP SERPL CALC-SCNC: 9 MMOL/L (ref 0–11.9)
APTT PPP: 56.9 SEC (ref 24.7–36)
AST SERPL-CCNC: 11 U/L (ref 12–45)
BASOPHILS # BLD AUTO: 0.5 % (ref 0–1.8)
BASOPHILS # BLD: 0.05 K/UL (ref 0–0.12)
BILIRUB SERPL-MCNC: 0.7 MG/DL (ref 0.1–1.5)
BNP SERPL-MCNC: 82 PG/ML (ref 0–100)
BUN SERPL-MCNC: 19 MG/DL (ref 8–22)
CALCIUM SERPL-MCNC: 8.8 MG/DL (ref 8.5–10.5)
CHLORIDE SERPL-SCNC: 102 MMOL/L (ref 96–112)
CO2 SERPL-SCNC: 25 MMOL/L (ref 20–33)
CREAT SERPL-MCNC: 1.3 MG/DL (ref 0.5–1.4)
EKG IMPRESSION: NORMAL
EKG IMPRESSION: NORMAL
EOSINOPHIL # BLD AUTO: 0.15 K/UL (ref 0–0.51)
EOSINOPHIL NFR BLD: 1.6 % (ref 0–6.9)
ERYTHROCYTE [DISTWIDTH] IN BLOOD BY AUTOMATED COUNT: 48.7 FL (ref 35.9–50)
EST. AVERAGE GLUCOSE BLD GHB EST-MCNC: 131 MG/DL
GLOBULIN SER CALC-MCNC: 2.4 G/DL (ref 1.9–3.5)
GLUCOSE SERPL-MCNC: 144 MG/DL (ref 65–99)
HBA1C MFR BLD: 6.2 % (ref 0–5.6)
HCT VFR BLD AUTO: 48.3 % (ref 42–52)
HGB BLD-MCNC: 16.3 G/DL (ref 14–18)
IMM GRANULOCYTES # BLD AUTO: 0.1 K/UL (ref 0–0.11)
IMM GRANULOCYTES NFR BLD AUTO: 1.1 % (ref 0–0.9)
INR PPP: 4.63 (ref 0.87–1.13)
LIPASE SERPL-CCNC: 19 U/L (ref 11–82)
LYMPHOCYTES # BLD AUTO: 0.88 K/UL (ref 1–4.8)
LYMPHOCYTES NFR BLD: 9.5 % (ref 22–41)
MCH RBC QN AUTO: 32.2 PG (ref 27–33)
MCHC RBC AUTO-ENTMCNC: 33.7 G/DL (ref 33.7–35.3)
MCV RBC AUTO: 95.5 FL (ref 81.4–97.8)
MONOCYTES # BLD AUTO: 0.52 K/UL (ref 0–0.85)
MONOCYTES NFR BLD AUTO: 5.6 % (ref 0–13.4)
NEUTROPHILS # BLD AUTO: 7.56 K/UL (ref 1.82–7.42)
NEUTROPHILS NFR BLD: 81.7 % (ref 44–72)
NRBC # BLD AUTO: 0 K/UL
NRBC BLD-RTO: 0 /100 WBC
PLATELET # BLD AUTO: 231 K/UL (ref 164–446)
PMV BLD AUTO: 9.4 FL (ref 9–12.9)
POTASSIUM SERPL-SCNC: 3.8 MMOL/L (ref 3.6–5.5)
PROT SERPL-MCNC: 6.3 G/DL (ref 6–8.2)
PROTHROMBIN TIME: 43.5 SEC (ref 12–14.6)
RBC # BLD AUTO: 5.06 M/UL (ref 4.7–6.1)
SODIUM SERPL-SCNC: 136 MMOL/L (ref 135–145)
TROPONIN I SERPL-MCNC: 0.04 NG/ML (ref 0–0.04)
TROPONIN I SERPL-MCNC: 0.05 NG/ML (ref 0–0.04)
TROPONIN I SERPL-MCNC: 0.05 NG/ML (ref 0–0.04)
WBC # BLD AUTO: 9.3 K/UL (ref 4.8–10.8)

## 2018-02-24 PROCEDURE — 83036 HEMOGLOBIN GLYCOSYLATED A1C: CPT

## 2018-02-24 PROCEDURE — 36415 COLL VENOUS BLD VENIPUNCTURE: CPT

## 2018-02-24 PROCEDURE — 700102 HCHG RX REV CODE 250 W/ 637 OVERRIDE(OP): Performed by: EMERGENCY MEDICINE

## 2018-02-24 PROCEDURE — A9270 NON-COVERED ITEM OR SERVICE: HCPCS | Performed by: EMERGENCY MEDICINE

## 2018-02-24 PROCEDURE — G0378 HOSPITAL OBSERVATION PER HR: HCPCS

## 2018-02-24 PROCEDURE — 93010 ELECTROCARDIOGRAM REPORT: CPT | Performed by: INTERNAL MEDICINE

## 2018-02-24 PROCEDURE — 700102 HCHG RX REV CODE 250 W/ 637 OVERRIDE(OP): Performed by: HOSPITALIST

## 2018-02-24 PROCEDURE — 93005 ELECTROCARDIOGRAM TRACING: CPT

## 2018-02-24 PROCEDURE — 71045 X-RAY EXAM CHEST 1 VIEW: CPT

## 2018-02-24 PROCEDURE — A9270 NON-COVERED ITEM OR SERVICE: HCPCS | Performed by: HOSPITALIST

## 2018-02-24 PROCEDURE — 99285 EMERGENCY DEPT VISIT HI MDM: CPT

## 2018-02-24 PROCEDURE — 93005 ELECTROCARDIOGRAM TRACING: CPT | Performed by: EMERGENCY MEDICINE

## 2018-02-24 PROCEDURE — 83690 ASSAY OF LIPASE: CPT

## 2018-02-24 PROCEDURE — 85025 COMPLETE CBC W/AUTO DIFF WBC: CPT

## 2018-02-24 PROCEDURE — 84484 ASSAY OF TROPONIN QUANT: CPT

## 2018-02-24 PROCEDURE — 93005 ELECTROCARDIOGRAM TRACING: CPT | Performed by: FAMILY MEDICINE

## 2018-02-24 PROCEDURE — 80053 COMPREHEN METABOLIC PANEL: CPT

## 2018-02-24 PROCEDURE — 83880 ASSAY OF NATRIURETIC PEPTIDE: CPT

## 2018-02-24 PROCEDURE — 85730 THROMBOPLASTIN TIME PARTIAL: CPT

## 2018-02-24 PROCEDURE — 85610 PROTHROMBIN TIME: CPT

## 2018-02-24 RX ORDER — ACETAMINOPHEN 325 MG/1
650 TABLET ORAL EVERY 4 HOURS PRN
Status: DISCONTINUED | OUTPATIENT
Start: 2018-02-24 | End: 2018-02-25 | Stop reason: HOSPADM

## 2018-02-24 RX ORDER — TAMSULOSIN HYDROCHLORIDE 0.4 MG/1
0.4 CAPSULE ORAL EVERY EVENING
Status: DISCONTINUED | OUTPATIENT
Start: 2018-02-24 | End: 2018-02-25 | Stop reason: HOSPADM

## 2018-02-24 RX ORDER — ASPIRIN 81 MG/1
324 TABLET, CHEWABLE ORAL ONCE
Status: COMPLETED | OUTPATIENT
Start: 2018-02-24 | End: 2018-02-24

## 2018-02-24 RX ORDER — ACETAMINOPHEN 325 MG/1
650 TABLET ORAL EVERY 4 HOURS PRN
COMMUNITY
End: 2018-04-24

## 2018-02-24 RX ADMIN — METOPROLOL TARTRATE 25 MG: 25 TABLET, FILM COATED ORAL at 20:12

## 2018-02-24 RX ADMIN — TAMSULOSIN HYDROCHLORIDE 0.4 MG: 0.4 CAPSULE ORAL at 20:12

## 2018-02-24 RX ADMIN — ACETAMINOPHEN 650 MG: 325 TABLET, FILM COATED ORAL at 20:12

## 2018-02-24 RX ADMIN — ASPIRIN 324 MG: 81 TABLET, CHEWABLE ORAL at 09:54

## 2018-02-24 RX ADMIN — ACETAMINOPHEN 650 MG: 325 TABLET, FILM COATED ORAL at 16:47

## 2018-02-24 ASSESSMENT — PATIENT HEALTH QUESTIONNAIRE - PHQ9
2. FEELING DOWN, DEPRESSED, IRRITABLE, OR HOPELESS: NOT AT ALL
SUM OF ALL RESPONSES TO PHQ9 QUESTIONS 1 AND 2: 0
SUM OF ALL RESPONSES TO PHQ QUESTIONS 1-9: 0
SUM OF ALL RESPONSES TO PHQ QUESTIONS 1-9: 0
1. LITTLE INTEREST OR PLEASURE IN DOING THINGS: NOT AT ALL
2. FEELING DOWN, DEPRESSED, IRRITABLE, OR HOPELESS: NOT AT ALL
1. LITTLE INTEREST OR PLEASURE IN DOING THINGS: NOT AT ALL
SUM OF ALL RESPONSES TO PHQ9 QUESTIONS 1 AND 2: 0

## 2018-02-24 ASSESSMENT — CHA2DS2 SCORE
CHF OR LEFT VENTRICULAR DYSFUNCTION: NO
AGE 65 TO 74: NO
HYPERTENSION: YES
AGE 75 OR GREATER: NO
DIABETES: NO
CHA2DS2 VASC SCORE: 4
SEX: MALE
VASCULAR DISEASE: YES
PRIOR STROKE OR TIA OR THROMBOEMBOLISM: YES

## 2018-02-24 ASSESSMENT — PAIN SCALES - GENERAL
PAINLEVEL_OUTOF10: 9
PAINLEVEL_OUTOF10: 9
PAINLEVEL_OUTOF10: 6
PAINLEVEL_OUTOF10: 6
PAINLEVEL_OUTOF10: 0

## 2018-02-24 ASSESSMENT — PAIN SCALES - WONG BAKER
WONGBAKER_NUMERICALRESPONSE: HURTS EVEN MORE
WONGBAKER_NUMERICALRESPONSE: HURTS EVEN MORE
WONGBAKER_NUMERICALRESPONSE: DOESN'T HURT AT ALL

## 2018-02-24 ASSESSMENT — LIFESTYLE VARIABLES
EVER_SMOKED: NEVER
ALCOHOL_USE: NO
DO YOU DRINK ALCOHOL: NO
DO YOU DRINK ALCOHOL: NO

## 2018-02-24 NOTE — ED PROVIDER NOTES
ED Provider Note    CHIEF COMPLAINT  Chief Complaint   Patient presents with   • Atrial Fibrillation   • Chest Pain       HPI  Morales Olivier is a 62 y.o. male who presents with chest pain. Chest pain started last night at about 11 PM. Sharp character. Seemed to come and go. He was able to go to sleep. When he woke up the pain returned. Just prior to making it to the hospital he states that his pain resolved. He still feels a fluttering in his chest occasionally. There was associated nausea without vomiting. No diaphoresis or shortness of breath. No tearing pain or radiation of the back. No pleuritic symptoms. No fevers or recent illness.    Complaint is of left great toe pain. He has a history of tophaceous gout. This feels similar.    REVIEW OF SYSTEMS  As per HPI, otherwise a 10 point review of systems is negative    PAST MEDICAL HISTORY  Past Medical History:   Diagnosis Date   • Atrial fibrillation (CMS-HCC)    • Bronchitis    • CAD (coronary artery disease)    • Gout    • Heart valve disease     mitral valve replacement (bovine)   • Hypertension    • Pacemaker    • Personal history of venous thrombosis and embolism 2009    DVT right leg   • Renal disorder     kidney stones   • Type II or unspecified type diabetes mellitus without mention of complication, not stated as uncontrolled     DM type II- diet controlled       SOCIAL HISTORY  Social History   Substance Use Topics   • Smoking status: Former Smoker     Quit date: 1/1/1981   • Smokeless tobacco: Never Used   • Alcohol use No       SURGICAL HISTORY  Past Surgical History:   Procedure Laterality Date   • MITRAL VALVE REPLACE  3/8/2017    Procedure: MITRAL VALVE REPLACE-REDO;  Surgeon: Cornelia Wright M.D.;  Location: SURGERY [facility];  Service:    • KD  3/8/2017    Procedure: KD;  Surgeon: Cornelia Wright M.D.;  Location: SURGERY [facility];  Service:    • IRRIGATION & DEBRIDEMENT GENERAL  7/20/2009    Performed by MIHCEL URBINA at  "SURGERY [facility] ORS   • WIDE EXCISION  7/20/2009    Performed by MICHEL URBINA at SURGERY [facility]   • ANGIOGRAM  7/4/2009    Performed by MICHEL URBINA at SURGERY [facility]   • CATH REMOVAL  7/4/2009    Performed by MICHEL URBINA at SURGERY [facility]   • THROMBECTOMY  7/4/2009    Performed by MICHEL URBINA at SURGERY [facility]   • EMBOLECTOMY  7/4/2009    Performed by MICHEL URBINA at [place] [facility]   • ANGIOGRAM  7/3/2009    Performed by MORGAN WARD at SURGERY [facility]   • MITRAL VALVE REPLACE  3/14/08    Performed by JEANA MARTELL at [facility]   • MAZE PROCEDURE  3/14/08    Performed by JEANA MARTELL at [facility]   • OTHER CARDIAC SURGERY  2008    mitral valve replacement   • OTHER ABDOMINAL SURGERY      imbulica repair        CURRENT MEDICATIONS  Home Medications     Reviewed by Gayle Dinero, Pharmacy Intern (Pharmacy Intern) on 02/24/18 at 1057  Med List Status: Complete   Medication Last Dose Status   acetaminophen (TYLENOL) 325 MG Tab prn Active   metoprolol (LOPRESSOR) 25 MG TABS 2/24/2018 Active   tamsulosin (FLOMAX) 0.4 MG capsule 2/23/2018 Active   warfarin (COUMADIN) 5 MG Tab 2/23/2018 Active                ALLERGIES  No Known Allergies    PHYSICAL EXAM  VITAL SIGNS: /90   Pulse 98   Temp 36.3 °C (97.3 °F)   Resp 17   Ht 1.778 m (5' 10\")   Wt 113.4 kg (250 lb)   SpO2 97%   BMI 35.87 kg/m²    Constitutional: Awake and alert  HENT:  Atraumatic, Normocephalic.Oropharynx dry mucus membranes, Nose normal inspection.   Eyes: Normal inspection  Neck: Supple  Cardiovascular: Normal heart rate, Normal rhythm.  Symmetric peripheral pulses. Sternotomy. Pacemaker  Thorax & Lungs: No respiratory distress, No wheezing, No rales, No rhonchi, No chest tenderness.   Abdomen: Bowel sounds normal, soft, non-distended, nontender, no mass  Skin: Warm, Dry, No rash.   Back: No tenderness, No CVA " tenderness.   Extremities: No asymmetric swelling. Gouty tophus noted. Swelling of the left great toe. No overlying erythema.  Neurologic: Grossly normal       RADIOLOGY/PROCEDURES  DX-CHEST-PORTABLE (1 VIEW)   Final Result      Mild cardiomegaly.         Imaging is interpreted by radiologist    Labs:  Results for orders placed or performed during the hospital encounter of 02/24/18   CBC with Differential   Result Value Ref Range    WBC 9.3 4.8 - 10.8 K/uL    RBC 5.06 4.70 - 6.10 M/uL    Hemoglobin 16.3 14.0 - 18.0 g/dL    Hematocrit 48.3 42.0 - 52.0 %    MCV 95.5 81.4 - 97.8 fL    MCH 32.2 27.0 - 33.0 pg    MCHC 33.7 33.7 - 35.3 g/dL    RDW 48.7 35.9 - 50.0 fL    Platelet Count 231 164 - 446 K/uL    MPV 9.4 9.0 - 12.9 fL    Neutrophils-Polys 81.70 (H) 44.00 - 72.00 %    Lymphocytes 9.50 (L) 22.00 - 41.00 %    Monocytes 5.60 0.00 - 13.40 %    Eosinophils 1.60 0.00 - 6.90 %    Basophils 0.50 0.00 - 1.80 %    Immature Granulocytes 1.10 (H) 0.00 - 0.90 %    Nucleated RBC 0.00 /100 WBC    Neutrophils (Absolute) 7.56 (H) 1.82 - 7.42 K/uL    Lymphs (Absolute) 0.88 (L) 1.00 - 4.80 K/uL    Monos (Absolute) 0.52 0.00 - 0.85 K/uL    Eos (Absolute) 0.15 0.00 - 0.51 K/uL    Baso (Absolute) 0.05 0.00 - 0.12 K/uL    Immature Granulocytes (abs) 0.10 0.00 - 0.11 K/uL    NRBC (Absolute) 0.00 K/uL   Complete Metabolic Panel (CMP)   Result Value Ref Range    Sodium 136 135 - 145 mmol/L    Potassium 3.8 3.6 - 5.5 mmol/L    Chloride 102 96 - 112 mmol/L    Co2 25 20 - 33 mmol/L    Anion Gap 9.0 0.0 - 11.9    Glucose 144 (H) 65 - 99 mg/dL    Bun 19 8 - 22 mg/dL    Creatinine 1.30 0.50 - 1.40 mg/dL    Calcium 8.8 8.5 - 10.5 mg/dL    AST(SGOT) 11 (L) 12 - 45 U/L    ALT(SGPT) 5 2 - 50 U/L    Alkaline Phosphatase 65 30 - 99 U/L    Total Bilirubin 0.7 0.1 - 1.5 mg/dL    Albumin 3.9 3.2 - 4.9 g/dL    Total Protein 6.3 6.0 - 8.2 g/dL    Globulin 2.4 1.9 - 3.5 g/dL    A-G Ratio 1.6 g/dL   Btype Natriuretic Peptide (BNP)   Result Value Ref Range     B Natriuretic Peptide 82 0 - 100 pg/mL   Prothrombin Time (PT/INR)   Result Value Ref Range    PT 43.5 (H) 12.0 - 14.6 sec    INR 4.63 (H) 0.87 - 1.13   APTT   Result Value Ref Range    APTT 56.9 (H) 24.7 - 36.0 sec   Lipase   Result Value Ref Range    Lipase 19 11 - 82 U/L   Troponin STAT   Result Value Ref Range    Troponin I 0.05 (H) 0.00 - 0.04 ng/mL   ESTIMATED GFR   Result Value Ref Range    GFR If African American >60 >60 mL/min/1.73 m 2    GFR If Non  56 (A) >60 mL/min/1.73 m 2   TROPONIN   Result Value Ref Range    Troponin I 0.05 (H) 0.00 - 0.04 ng/mL   EKG (ER)   Result Value Ref Range    Report       [facility] Emergency Dept.    Test Date:  2018  Pt Name:    IALYN MCINTOSH                 Department: ER  MRN:        [id number]                      Room:       [facility]  Gender:     Male                         Technician: [id number]  :        1955                   Requested By:ER TRIAGE PROTOCOL  Order #:    [id number]                    Reading MD: MARCIAL RAMIREZ MD    Measurements  Intervals                                Axis  Rate:       97                           P:          167  LA:         148                          QRS:        77  QRSD:       86                           T:          -62  QT:         352  QTc:        447    Interpretive Statements  SINUS OR ECTOPIC ATRIAL RHYTHM  REPOL ABNRM SUGGESTS ISCHEMIA, DIFFUSE LEADS  BASELINE WANDER IN LEAD(S) V6  Compared to ECG 2017 17:47:24  Ectopic atrial rhythm now present  Early repolarization now present  Possible ischemia now present  Ventricular-paced complex(es) or rhythm no longer present     Electronically Signed On 2018 9:48:24 PST by MARCIAL RAMIREZ MD           COURSE & MEDICAL DECISION MAKING  Patient presents to the ER with transient chest pain. He has cardiac risk factors. He was given aspirin. He is chest pain-free currently. Workup was undertaken. Results as noted above. He will  be admitted to the hospital for further evaluation. Hospitalist was consulted for admission. I was told that the patient established at [facility]. I spoke with the resident team.    FINAL IMPRESSION  1. Chest pain  2. Gout      This dictation was created using voice recognition software. The accuracy of the dictation is limited to the abilities of the software.  The nursing notes were reviewed and certain aspects of this information were incorporated into this note.      Electronically signed by: Lucio Shi, 2/24/2018 12:01 PM

## 2018-02-24 NOTE — ED TRIAGE NOTES
EMS sts, Pt c/o fluttering feeling in his chest since last night. It woke him up in his sleep. Pt denies SOB, lightheadedness, or dizziness. Pt with hx of Afib.

## 2018-02-24 NOTE — PROGRESS NOTES
Inpatient Anticoagulation Service Note    Date: 2/24/2018  Reason for Anticoagulation: Atrial Fibrillation, Transient Ischemic Attack, Bioprosthetic Valve Replacement   [id number] VASc Score: 4    Hemoglobin Value: 16.3  Hematocrit Value: 48.3  Lab Platelet Value: 231  Target INR: 2.0 to 3.0    INR from last 7 days     Date/Time INR Value    02/24/18 0944 (!)  4.63        Dose from last 7 days     Date/Time Dose (mg)    02/24/18 1100  0        Average Dose (mg):  (Home dose: alternating 4mg & 5mg daily per medrec)  Significant Interactions: Not Applicable  Bridge Therapy: No    Reversal Agent Administered: Not Applicable  Comments: Admit for chest pain - trops and BNP negative for STEMI at this time. Continue home warfarin for A.fib with a hx of TIA and mitral valve replacement. INR supratherapeutic - hold todays warfarin. H/H appears stable with no indication of bleeding noted. No DDI identified. Pharmacy to [facility].     Plan:  Hold warfarin today with INR check tomorrow  Education Material Provided?: No (Chronic tx)  Pharmacist suggested discharge dosing: Hold warfarin x2 days continued by home dose with close f/u within 3 days       Pam Hobbs, PharmD., BCPS

## 2018-02-24 NOTE — PROGRESS NOTES
Patient arrived to unit from main ER via gurney with ER nurse.  Pt's weight obtained on stand up scale, see flow sheet.  Pt ambulated from gurney to bed with use of single point cane, gait slightly unsteady.  Pt educated on unit/hospital policy, call light.

## 2018-02-25 ENCOUNTER — APPOINTMENT (OUTPATIENT)
Dept: RADIOLOGY | Facility: MEDICAL CENTER | Age: 63
End: 2018-02-25
Attending: FAMILY MEDICINE
Payer: MEDICARE

## 2018-02-25 VITALS
RESPIRATION RATE: 19 BRPM | HEART RATE: 135 BPM | WEIGHT: 250 LBS | BODY MASS INDEX: 35.79 KG/M2 | SYSTOLIC BLOOD PRESSURE: 153 MMHG | HEIGHT: 70 IN | OXYGEN SATURATION: 96 % | DIASTOLIC BLOOD PRESSURE: 89 MMHG | TEMPERATURE: 97.3 F

## 2018-02-25 LAB
ALBUMIN SERPL BCP-MCNC: 3.2 G/DL (ref 3.2–4.9)
ALBUMIN/GLOB SERPL: 1.5 G/DL
ALP SERPL-CCNC: 51 U/L (ref 30–99)
ALT SERPL-CCNC: <5 U/L (ref 2–50)
ANION GAP SERPL CALC-SCNC: 8 MMOL/L (ref 0–11.9)
AST SERPL-CCNC: 8 U/L (ref 12–45)
BILIRUB SERPL-MCNC: 0.4 MG/DL (ref 0.1–1.5)
BUN SERPL-MCNC: 21 MG/DL (ref 8–22)
CALCIUM SERPL-MCNC: 8.2 MG/DL (ref 8.5–10.5)
CHLORIDE SERPL-SCNC: 110 MMOL/L (ref 96–112)
CO2 SERPL-SCNC: 22 MMOL/L (ref 20–33)
CREAT SERPL-MCNC: 1.05 MG/DL (ref 0.5–1.4)
EKG IMPRESSION: NORMAL
ERYTHROCYTE [DISTWIDTH] IN BLOOD BY AUTOMATED COUNT: 47.8 FL (ref 35.9–50)
GLOBULIN SER CALC-MCNC: 2.1 G/DL (ref 1.9–3.5)
GLUCOSE SERPL-MCNC: 84 MG/DL (ref 65–99)
HCT VFR BLD AUTO: 43.1 % (ref 42–52)
HGB BLD-MCNC: 15.2 G/DL (ref 14–18)
INR PPP: 4.57 (ref 0.87–1.13)
MCH RBC QN AUTO: 34 PG (ref 27–33)
MCHC RBC AUTO-ENTMCNC: 35.3 G/DL (ref 33.7–35.3)
MCV RBC AUTO: 96.4 FL (ref 81.4–97.8)
PLATELET # BLD AUTO: 188 K/UL (ref 164–446)
PMV BLD AUTO: 9.6 FL (ref 9–12.9)
POTASSIUM SERPL-SCNC: 3.9 MMOL/L (ref 3.6–5.5)
PROT SERPL-MCNC: 5.3 G/DL (ref 6–8.2)
PROTHROMBIN TIME: 43.1 SEC (ref 12–14.6)
RBC # BLD AUTO: 4.47 M/UL (ref 4.7–6.1)
SODIUM SERPL-SCNC: 140 MMOL/L (ref 135–145)
TROPONIN I SERPL-MCNC: 0.04 NG/ML (ref 0–0.04)
WBC # BLD AUTO: 7.6 K/UL (ref 4.8–10.8)

## 2018-02-25 PROCEDURE — 85610 PROTHROMBIN TIME: CPT

## 2018-02-25 PROCEDURE — 84484 ASSAY OF TROPONIN QUANT: CPT

## 2018-02-25 PROCEDURE — G0378 HOSPITAL OBSERVATION PER HR: HCPCS

## 2018-02-25 PROCEDURE — 700102 HCHG RX REV CODE 250 W/ 637 OVERRIDE(OP): Performed by: HOSPITALIST

## 2018-02-25 PROCEDURE — A9270 NON-COVERED ITEM OR SERVICE: HCPCS | Performed by: HOSPITALIST

## 2018-02-25 PROCEDURE — A9270 NON-COVERED ITEM OR SERVICE: HCPCS | Performed by: FAMILY MEDICINE

## 2018-02-25 PROCEDURE — 80053 COMPREHEN METABOLIC PANEL: CPT

## 2018-02-25 PROCEDURE — 36415 COLL VENOUS BLD VENIPUNCTURE: CPT

## 2018-02-25 PROCEDURE — 700102 HCHG RX REV CODE 250 W/ 637 OVERRIDE(OP): Performed by: FAMILY MEDICINE

## 2018-02-25 PROCEDURE — 700111 HCHG RX REV CODE 636 W/ 250 OVERRIDE (IP)

## 2018-02-25 PROCEDURE — 85027 COMPLETE CBC AUTOMATED: CPT

## 2018-02-25 PROCEDURE — A9502 TC99M TETROFOSMIN: HCPCS

## 2018-02-25 RX ORDER — INDOMETHACIN 50 MG/1
50 CAPSULE ORAL 3 TIMES DAILY PRN
Qty: 90 CAP | Refills: 3 | Status: SHIPPED | OUTPATIENT
Start: 2018-02-25 | End: 2018-04-24

## 2018-02-25 RX ORDER — INDOMETHACIN 50 MG/1
50 CAPSULE ORAL 3 TIMES DAILY PRN
Status: DISCONTINUED | OUTPATIENT
Start: 2018-02-25 | End: 2018-02-25 | Stop reason: HOSPADM

## 2018-02-25 RX ORDER — REGADENOSON 0.08 MG/ML
INJECTION, SOLUTION INTRAVENOUS
Status: COMPLETED
Start: 2018-02-25 | End: 2018-02-25

## 2018-02-25 RX ADMIN — LIDOCAINE HYDROCHLORIDE 30 ML: 20 SOLUTION OROPHARYNGEAL at 09:22

## 2018-02-25 RX ADMIN — METOPROLOL TARTRATE 25 MG: 25 TABLET, FILM COATED ORAL at 09:22

## 2018-02-25 RX ADMIN — ACETAMINOPHEN 650 MG: 325 TABLET, FILM COATED ORAL at 09:22

## 2018-02-25 RX ADMIN — REGADENOSON 0.4 MG: 0.08 INJECTION, SOLUTION INTRAVENOUS at 08:16

## 2018-02-25 RX ADMIN — INDOMETHACIN 50 MG: 50 CAPSULE ORAL at 09:23

## 2018-02-25 ASSESSMENT — PAIN SCALES - WONG BAKER: WONGBAKER_NUMERICALRESPONSE: DOESN'T HURT AT ALL

## 2018-02-25 ASSESSMENT — PAIN SCALES - GENERAL
PAINLEVEL_OUTOF10: 0
PAINLEVEL_OUTOF10: 4

## 2018-02-25 NOTE — DISCHARGE SUMMARY
PATIENT SUMMARY     Admit Date:  2/24/2018       Discharge Date:   2/25/2018    Service:   [facility] Family Medicine Team  Attending Physician(s):   Dr Thomas      Senior Resident(s):   Dr River      Primary Diagnosis:   CP, unknown etiology  ST Depression    Secondary Diagnoses:                Gout  Afib  DM2  HTN    HPI(Brief Narrative):       Morales Olivier is a 62 y.o. male with a past medical history of mitral valve replacement(initially in 2008 for stenosis then again in 2017 for valve endocarditis), diet controlled type 2 DM, COPD, Afib on coumadin, Pacer.     He presents to  ED with chest pain starting last night. He describes the pain as an ache, without associated SOB, diaphoresis, radiation. This occurs equally at rest and with activity. He had a negative cardiac cath in December 2016. He states that he intermittently has had this pain for about a year, but last night was the worst episode he recalls having. At this point he does not endorse any symptoms.    Patient /Hospital Summary (Details -- Problem Oriented) :        Patients course was uneventful, all chronic issues remained stable. On HD 1 patients troponin's were trended and remained wnls. Patients EKG did show ST depressions and was stressed the following day which showed no abnormalitys. His chest pain which is chronic in nature could be attributed to reflux like symptoms.  Patient did have a right toe gout flare which was treated with indomethacin and will need to f/u with his pcp. Patients INR was supratherapuetic on his warfarin regimen and will be referred to Coumadin clinic as patient is only seen once a month for his INRs and states they have been wavering. Patient discharged in guarded coundition    Consultants:      None    Procedures:        Chemical Stres:  No evidence of significant jeopardized viable myocardium or prior myocardial    infarction.  Normal left ventricular size, ejection fraction, and wall motion    Imaging/ Testing:       Xray: Cardiomegaly    Discharge Medications:           Medication List      START taking these medications      Instructions   indomethacin 50 MG Caps  Commonly known as:  INDOCIN   Take 1 Cap by mouth 3 times a day as needed.  Dose:  50 mg        CONTINUE taking these medications      Instructions   metoprolol 25 MG Tabs  Commonly known as:  LOPRESSOR   Take 1 Tab by mouth 2 Times a Day.  Dose:  25 mg     tamsulosin 0.4 MG capsule  Commonly known as:  FLOMAX   Take 0.4 mg by mouth every day.  Dose:  0.4 mg     warfarin 5 MG Tabs  Commonly known as:  COUMADIN   Take 4-5 mg by mouth every day. 4mg one day, 5mg the next and so on  Patient on 5mg for tonight  Dose:  4-5 mg        ASK your doctor about these medications      Instructions   acetaminophen 325 MG Tabs  Commonly known as:  TYLENOL   Take 650 mg by mouth every four hours as needed.  Dose:  650 mg            Disposition:   Stable    Diet:   As tolerated    Activity:   As tolerated    Instructions:      The patient was instructed to return to the ER in the event of worsening symptoms. I have counseled the patient on the importance of compliance and the patient has agreed to proceed with all medical recommendations and follow up plan indicated above.   The patient understands that all medications come with benefits and risks. Risks may include permanent injury or death and these risks can be minimized with close reassessment and monitoring.        Primary Care Provider:      Discharge summary faxed to primary care provider      Follow up appointment details :      Dr Wild-1 week    Pending Studies:        None    Time spent on discharge day patient visit, preparing discharge paperwork and arranging for patient follow up.  Less than 30 minutes

## 2018-02-25 NOTE — PROGRESS NOTES
Report received, assumed patient care.  Pt A&OX4.  Assessment completed.  Call light within reach, personal belongings available, bed in lowest position, pt calling for assistance.  Pt reports LLE  pain d/t gout. Pt reports no CP.  Pt is NPO, pending stress test this AM. Communication board updated, POC discussed.  Monitors reapplied, VSS.  No additional needs at this time.

## 2018-02-25 NOTE — PROGRESS NOTES
Resting in bed, denies chest pain or sob. sr- no ectopy. Needs attended. Call light within reach. To continue to monitor.

## 2018-02-25 NOTE — PROGRESS NOTES
Received in bed, aox4, st hr 101  on monitor. Assessment as per CDU. Call light within reach. Needs attended. Plan of care discussed and understood.

## 2018-02-25 NOTE — PROGRESS NOTES
Inpatient Anticoagulation Service Note    Date: 2/25/2018  Reason for Anticoagulation: Atrial Fibrillation, Transient Ischemic Attack, Bioprosthetic Valve Replacement   LAE1UD2 VASc Score: 4    Hemoglobin Value: 15.2  Hematocrit Value: 43.1  Lab Platelet Value: 188  Target INR: 2.0 to 3.0    INR from last 7 days     Date/Time INR Value    02/25/18 0248 (!)  4.57    02/24/18 0944 (!)  4.63        Dose from last 7 days     Date/Time Dose (mg)    02/25/18 0900  0    02/24/18 1100  0        Average Dose (mg):  (Home dose: alternating 4mg & 5mg daily per medrec)  Significant Interactions: Not Applicable  Bridge Therapy: No    Reversal Agent Administered: Not Applicable  Comments: INR wiht minimal downward trend. No overt S/S bleeding noted. Continue to hold dose as INR remains supratherapeutic.    Plan:  HOLD dose tonight, INR tomorrow  Education Material Provided?: No (Chronic tx)  Pharmacist suggested discharge dosing: TBSIMONE Mascorro, PHARMD

## 2018-02-25 NOTE — PROGRESS NOTES
"[facility] FAMILY MEDICINE PROGRESS NOTE     Attending: Dr. Thomas  Resident: Dr. River    PATIENT: Morales Olivier; [id number]; 1955; Hospital Day: 2    ID: 62 y.o. male admitted for ACS r/o    SUBJECTIVE: No acute events overnight, still complaining of chest pain however no dyspnea, diaphoreses, radiation, or nausea. Is complaining about his gout this am.     OBJECTIVE: /88   Pulse 98   Temp 36.4 °C (97.5 °F)   Resp 18   Ht 1.778 m (5' 10\")   Wt 113.4 kg (250 lb)   SpO2 96%   BMI 35.87 kg/m²   Vitals:    02/24/18 1552 02/1955 02/24/18 2343 02/25/18 0352   BP: 127/87 124/81 116/75 143/88   Pulse: 100 100 99 98   Resp: 19 18 18 18   Temp: 36.2 °C (97.2 °F) 36.4 °C (97.5 °F) 36.7 °C (98.1 °F) 36.4 °C (97.5 °F)   SpO2: 97% 94% 95% 96%   Weight:       Height:       Pulse Oximetry: 96 %, O2 (LPM): 0, O2 Delivery: None (Room Air)    No intake or output data in the 24 hours ending 02/25/18 0705   DIET:   Orders Placed This Encounter   Procedures   • DIET ORDER     Standing Status:   Standing     Number of Occurrences:   1     Order Specific Question:   Diet:     Answer:   Regular [1]     Order Specific Question:   Miscellaneous modifications:     Answer:   No Decaf, No Caffeine(for test) [11]     Comments:   Protocol 1313 Patient to have no caffeine for 12 hours prior to exam (decaf, coffee, cola, tea, chocolate)       MEDS:  Medications   acetaminophen (TYLENOL) tablet 650 mg (650 mg Oral Given 2/24/18 2012)   metoprolol (LOPRESSOR) tablet 25 mg (25 mg Oral Given 2/24/18 2012)   tamsulosin (FLOMAX) capsule 0.4 mg (0.4 mg Oral Given 2/24/18 2012)   MD ALERT... warfarin (COUMADIN) per pharmacy protocol (not administered)   indomethacin (INDOCIN) capsule 50 mg (not administered)   aspirin (ASA) chewable tab 324 mg (324 mg Oral Given 2/24/18 0954)       PE:  General: No acute distress.   HEENT: NC/AT. MMM.  Cardiovascular: RRR with with loud murmur, +scar.  Respiratory: Symmetrical chest. CTAB with no W/R/R. "   Abdomen: soft, NT/ND, no masses, no rebound  EXT: BIANCHI, No C/C/E, R toe gouty flare    LABS:  Lab Results   Component Value Date    WBC 7.6 02/25/2018    HEMOGLOBIN 15.2 02/25/2018    HEMATOCRIT 43.1 02/25/2018    PLATELETCT 188 02/25/2018    MCV 96.4 02/25/2018     Lab Results   Component Value Date    SODIUM 140 02/25/2018    POTASSIUM 3.9 02/25/2018    CHLORIDE 110 02/25/2018    CO2 22 02/25/2018    BUN 21 02/25/2018    CREATININE 1.05 02/25/2018    GLUCOSE 84 02/25/2018     No results for input(s): POCGLUCOSE in the last 72 hours.    MICROBIOLOGY:  No results for input(s): BLOODCULTU in the last 72 hours.     IMAGING:  Cardiomegaly    ASSESSMENT/PLAN:  Morales Olivier is a 62 y.o. male with CP    ASSESSMENT/PLAN: 62 y.o. male admitted for chest pain with new ST depression in precordial leads.     #Chest pain  # ST depression  Possible UA vs NSTEMI  Troponin x3  Mild chest pain, will trial GI cocktail   Normal cardiac cath in Dec 2016     Plan  -trend troponin  -Trend EKG  -chemical stress test this am  -Continue home BB     #Gout flare  States Tylenol has been effective recently     Plan  -Tylenol and one time indo, careful 2/2 INR     #Afib  #Supratherapeutic INR     Plan  -Hold coumadin tonight  -Monitor INR  -Referall to Coumadin clinic     #Diabetes  Patient states this is diet controlled     Plan  -A1c wnl     # HTN     Plan  -Continue BB     Dispo: D/c pending stress with f/u   PPx: Therapeutic on coumadin

## 2018-02-25 NOTE — RESPIRATORY CARE
COPD EDUCATION by COPD CLINICAL EDUCATOR  2/25/2018 at 7:12 AM by Britt Del Real     Patient reviewed by COPD education team. Patient does not qualify for COPD program.

## 2018-02-25 NOTE — PROGRESS NOTES
Pt discharged to home. IV d/c'd, pt armband removed. Pt understands written and verbal d/c instructions.  Prescription given.  Regular diet, activity as tolerated.  Pt to follow up with PCP and cardiology and coumadin clinic.  Bus pass given.  [facility] paperwork and medicaid number provided if pt changes mind about calling.  Pt verbalized understanding.

## 2018-02-25 NOTE — PROGRESS NOTES
"Pt to d/c.  Pt states, \"I have no way to get home and I have no money.\"  Pt does have medicaid, offered pt number to call to set up ride.  \"Pt states, \"I don't really feel like calling anyone or doing anything right now.\"  Will d/c pt.  "

## 2018-02-25 NOTE — DISCHARGE INSTRUCTIONS
Discharge Instructions    Discharged to home by car with self. Discharged via wheelchair, hospital escort: Yes.  Special equipment needed: Not Applicable    Be sure to schedule a follow-up appointment with your primary care doctor or any specialists as instructed.     Discharge Plan:   Diet Plan: Discussed (Cardiac)  Activity Level: Discussed (As tolerated)  Confirmed Follow up Appointment: Patient to Call and Schedule Appointment  Confirmed Symptoms Management: Discussed  Medication Reconciliation Updated: Yes  Influenza Vaccine Indication: Patient Refuses    I understand that a diet low in cholesterol, fat, and sodium is recommended for good health. Unless I have been given specific instructions below for another diet, I accept this instruction as my diet prescription.   Other diet: Cardiac    Special Instructions: None    · Is patient discharged on Warfarin / Coumadin?   No     Depression / Suicide Risk    As you are discharged from this [facility] facility, it is important to learn how to keep safe from harming yourself.    Recognize the warning signs:  · Abrupt changes in personality, positive or negative- including increase in energy   · Giving away possessions  · Change in eating patterns- significant weight changes-  positive or negative  · Change in sleeping patterns- unable to sleep or sleeping all the time   · Unwillingness or inability to communicate  · Depression  · Unusual sadness, discouragement and loneliness  · Talk of wanting to die  · Neglect of personal appearance   · Rebelliousness- reckless behavior  · Withdrawal from people/activities they love  · Confusion- inability to concentrate     If you or a loved one observes any of these behaviors or has concerns about self-harm, here's what you can do:  · Talk about it- your feelings and reasons for harming yourself  · Remove any means that you might use to hurt yourself (examples: pills, rope, extension cords, firearm)  · Get professional help  from the community (Mental Health, Substance Abuse, psychological counseling)  · Do not be alone:Call your Safe Contact- someone whom you trust who will be there for you.  · Call your local CRISIS HOTLINE 213-9961 or 028-589-6910  · Call your local Children's Mobile Crisis Response Team [place] (283) 644-5234 or www.[employer]  · Call the toll free National Suicide Prevention Hotlines   · National Suicide Prevention Lifeline 673-621-BOTD (2019)  · National Hope Line Network 800-SUICIDE (981-0740)

## 2018-02-25 NOTE — PROGRESS NOTES
Admit profile and assessment completed.  Pt A&Ox4.  Respirations even, unlabored on room air.  Pt reports 9/10 left toe pain, medicated per MAR.  Pt denies any CP, SOB, nausea, palpitations.  Monitors applied, sinus rhythm noted.  Boxed meal provided.   Bed in locked, lowest position.  Call light and belongings within reach.  Patient updated on POC, communication board updated.  Needs met, will continue to monitor

## 2018-02-25 NOTE — PROGRESS NOTES
Stress test results back and are negative.  Orders to d/c pt however coumadin clinic needs to be set up first.  MD paged and updated for referral to set up coumadin clinic appt.

## 2018-02-25 NOTE — H&P
[facility] MEDICINE HISTORY AND PHYSICAL     PATIENT ID:  NAME:  Morales Olivier  MRN:               [id number]  YOB: 1955    Date of Admission: 2/24/2018     Attending: William    Resident: Cale    Primary Care Physician:  Ivonne Wild    CC:  Chest pain    HPI: Morales Olivier is a 62 y.o. male with a past medical history of mitral valve replacement(initially in 2008 for stenosis then again in 2017 for valve endocarditis), diet controlled type 2 DM, COPD, Afib on coumadin, Pacer.    He presents to  ED with chest pain starting last night. He describes the pain as an ache, without associated SOB, diaphoresis, radiation. This occurs equally at rest and with activity. He had a negative cardiac cath in December 2016. He states that he intermittently has had this pain for about a year, but last night was the worst episode he recalls having. At this point he does not endorse any symptoms.    REVIEW OF SYSTEMS:   Ten systems reviewed and were negative except as noted in the HPI.                PAST MEDICAL HISTORY:  Past Medical History:   Diagnosis Date   • Atrial fibrillation (CMS-HCC)    • Bronchitis    • CAD (coronary artery disease)    • Gout    • Heart valve disease     mitral valve replacement (bovine)   • Hypertension    • Pacemaker    • Personal history of venous thrombosis and embolism 2009    DVT right leg   • Renal disorder     kidney stones   • Type II or unspecified type diabetes mellitus without mention of complication, not stated as uncontrolled     DM type II- diet controlled       PAST SURGICAL HISTORY:  Past Surgical History:   Procedure Laterality Date   • MITRAL VALVE REPLACE  3/8/2017    Procedure: MITRAL VALVE REPLACE-REDO;  Surgeon: Cornelia Wright M.D.;  Location: SURGERY [facility] [place];  Service:    • KD  3/8/2017    Procedure: KD;  Surgeon: Cornelia Wright M.D.;  Location: SURGERY [facility] [place];  Service:    • IRRIGATION & DEBRIDEMENT GENERAL  7/20/2009    Performed by  MICHEL URBINA at SURGERY [facility] ORS   • WIDE EXCISION  7/20/2009    Performed by MICHEL URBINA at SURGERY [facility] ORS   • ANGIOGRAM  7/4/2009    Performed by MICHEL URBINA at SURGERY [facility] ORS   • CATH REMOVAL  7/4/2009    Performed by MICHEL URBINA at SURGERY [facility] ORS   • THROMBECTOMY  7/4/2009    Performed by MICHEL URBINA at SURGERY [facility] ORS   • EMBOLECTOMY  7/4/2009    Performed by MICHEL URBINA at SURGERY [facility] ORS   • ANGIOGRAM  7/3/2009    Performed by MORGAN WARD at SURGERY [facility] ORS   • MITRAL VALVE REPLACE  3/14/08    Performed by JEANA MARTELL at [facility]   • MAZE PROCEDURE  3/14/08    Performed by JEANA MARTELL at [facility]   • OTHER CARDIAC SURGERY  2008    mitral valve replacement   • OTHER ABDOMINAL SURGERY      imbulica repair        FAMILY HISTORY:  No family history on file.    SOCIAL HISTORY:   Pt lives in [place]  Smoking Former, quit 1981  Etoh use none  Drug use occasional marijuana    DIET:   Orders Placed This Encounter   Procedures   • DIET ORDER     Standing Status:   Standing     Number of Occurrences:   1     Order Specific Question:   Diet:     Answer:   Regular [1]     Order Specific Question:   Miscellaneous modifications:     Answer:   No Decaf, No Caffeine(for test) [11]     Comments:   Protocol 1313 Patient to have no caffeine for 12 hours prior to exam (decaf, coffee, cola, tea, chocolate)       ALLERGIES:  No Known Allergies    OUTPATIENT MEDICATIONS:    Current Facility-Administered Medications:   •  acetaminophen (TYLENOL) tablet 650 mg, 650 mg, Oral, Q4HRS PRN, Ventura Beard M.D.  •  metoprolol (LOPRESSOR) tablet 25 mg, 25 mg, Oral, BID, Ventura Beard M.D.  •  tamsulosin (FLOMAX) capsule 0.4 mg, 0.4 mg, Oral, Q EVENING, Ventura Beard M.D.  •  MD ALERT... warfarin (COUMADIN) per pharmacy protocol, , Other, pharmacy to dose, Ventura Beard M.D.    PHYSICAL EXAM:  Vitals:     18 1100 18 1200 18 1230 18 1552   BP:   118/84 127/87   Pulse: 98 99 98 100   Resp: (!)    Temp:   36.7 °C (98 °F) 36.2 °C (97.2 °F)   SpO2: 95% 96% 96% 97%   Weight:       Height:       , Temp (24hrs), Av.4 °C (97.5 °F), Min:36.2 °C (97.2 °F), Max:36.7 °C (98 °F)  , Pulse Oximetry: 97 %, O2 (LPM): 0, O2 Delivery: None (Room Air)    General: Pt resting in NAD, cooperative   Skin:  Pink, warm and dry.  No rashes  HEENT: NC/AT. PERRL. EOMI. MMM. No nasal discharge. Oropharynx nonerythematous without exudate/plaques  Neck:  Supple without lymphadenopathy or rigidity. No JVD   Lungs:  Symmetrical.  CTAB with no W/R/R.  Good air movement   Cardiovascular:  S1/S2 RRR without M/R/G.  Abdomen:  Abdomen is soft NT/ND. No masses noted.    Extremities:  Full range of motion. No gross deformities noted. 2+ pulses in all extremities. No C/C/E   Spine:  Straight without vertebral anomalies.  CNS:  Muscle tone is normal. Cranial nerves II-XII grossly intact. 2+ DTRs.       LAB TESTS:   Recent Labs      18   0944   WBC  9.3   RBC  5.06   HEMOGLOBIN  16.3   HEMATOCRIT  48.3   MCV  95.5   MCH  32.2   RDW  48.7   PLATELETCT  231   MPV  9.4   NEUTSPOLYS  81.70*   LYMPHOCYTES  9.50*   MONOCYTES  5.60   EOSINOPHILS  1.60   BASOPHILS  0.50     Recent Labs      18   0944  18   1120   TROPONINI  0.05*  0.05*   BNPBTYPENAT  82   --      Recent Labs      18   0944   SODIUM  136   POTASSIUM  3.8   CHLORIDE  102   CO2  25   BUN  19   CREATININE  1.30   CALCIUM  8.8   ALBUMIN  3.9       CULTURES:   Results     ** No results found for the last 168 hours. **          IMAGES:  DX-CHEST-PORTABLE (1 VIEW)   Final Result      Mild cardiomegaly.          ASSESSMENT/PLAN: 62 y.o. male admitted for chest pain with new ST depression in precordial leads.    #Chest pain  # ST depression  Possible UA vs NSTEMI  Troponin at 0.05 x2 third draw pending  No further chest pain  Normal cardiac cath in  Dec 2016    Plan  -trend troponin  -Trend EKG  -chemical stress test tomorrow if negative  -Continue home BB    #Gout flare  Will hold off on Nsaids as we are ruling out ACS  States Tylenol has been effective recently    Plan  -Tylenol    #Afib  #Supratherapeutic INR    Plan  -Hold coumadin tonight  -Monitor INR    #Diabetes  Patient states this is diet controlled    Plan  -A1c pending    # HTN    Plan  -Continue BB    Dispo: Admit to telemetry observation  PPx: Therapeutic on coumadin

## 2018-02-26 ENCOUNTER — PATIENT OUTREACH (OUTPATIENT)
Dept: HEALTH INFORMATION MANAGEMENT | Facility: OTHER | Age: 63
End: 2018-02-26

## 2018-02-26 ENCOUNTER — TELEPHONE (OUTPATIENT)
Dept: VASCULAR LAB | Facility: MEDICAL CENTER | Age: 63
End: 2018-02-26

## 2018-02-26 NOTE — TELEPHONE ENCOUNTER
Called pt as we received a referral for anticoagulation.   Pt states that he's managed through [facility], and that he already has a f/u appt scheduled with them for INR check tomorrow.     Will close referral.     Denise Brown, CbD

## 2018-03-13 ENCOUNTER — HOSPITAL ENCOUNTER (OUTPATIENT)
Dept: LAB | Facility: MEDICAL CENTER | Age: 63
End: 2018-03-13
Attending: FAMILY MEDICINE
Payer: MEDICARE

## 2018-03-13 LAB
CHOLEST SERPL-MCNC: 134 MG/DL (ref 100–199)
HDLC SERPL-MCNC: 22 MG/DL
INR PPP: 3.25 (ref 0.87–1.13)
LDLC SERPL CALC-MCNC: 79 MG/DL
PROTHROMBIN TIME: 32.9 SEC (ref 12–14.6)
TRIGL SERPL-MCNC: 165 MG/DL (ref 0–149)

## 2018-03-13 PROCEDURE — 80061 LIPID PANEL: CPT

## 2018-03-13 PROCEDURE — 85610 PROTHROMBIN TIME: CPT | Mod: GA

## 2018-03-13 PROCEDURE — 36415 COLL VENOUS BLD VENIPUNCTURE: CPT

## 2018-04-24 ENCOUNTER — APPOINTMENT (OUTPATIENT)
Dept: RADIOLOGY | Facility: MEDICAL CENTER | Age: 63
End: 2018-04-24
Attending: EMERGENCY MEDICINE
Payer: MEDICARE

## 2018-04-24 ENCOUNTER — HOSPITAL ENCOUNTER (OUTPATIENT)
Facility: MEDICAL CENTER | Age: 63
End: 2018-04-25
Attending: EMERGENCY MEDICINE | Admitting: FAMILY MEDICINE
Payer: MEDICARE

## 2018-04-24 DIAGNOSIS — E86.0 DEHYDRATION: ICD-10-CM

## 2018-04-24 DIAGNOSIS — R55 NEAR SYNCOPE: ICD-10-CM

## 2018-04-24 DIAGNOSIS — Z79.01 CHRONIC ANTICOAGULATION: ICD-10-CM

## 2018-04-24 DIAGNOSIS — I48.91 ATRIAL FIBRILLATION, UNSPECIFIED TYPE (HCC): ICD-10-CM

## 2018-04-24 LAB
ALBUMIN SERPL BCP-MCNC: 3.7 G/DL (ref 3.2–4.9)
ALBUMIN/GLOB SERPL: 1.5 G/DL
ALP SERPL-CCNC: 59 U/L (ref 30–99)
ALT SERPL-CCNC: 9 U/L (ref 2–50)
ANION GAP SERPL CALC-SCNC: 8 MMOL/L (ref 0–11.9)
APPEARANCE UR: CLEAR
AST SERPL-CCNC: 16 U/L (ref 12–45)
BASOPHILS # BLD AUTO: 0.4 % (ref 0–1.8)
BASOPHILS # BLD: 0.02 K/UL (ref 0–0.12)
BILIRUB SERPL-MCNC: 0.9 MG/DL (ref 0.1–1.5)
BILIRUB UR QL STRIP.AUTO: NEGATIVE
BNP SERPL-MCNC: 40 PG/ML (ref 0–100)
BUN SERPL-MCNC: 13 MG/DL (ref 8–22)
CALCIUM SERPL-MCNC: 8.8 MG/DL (ref 8.5–10.5)
CHLORIDE SERPL-SCNC: 105 MMOL/L (ref 96–112)
CO2 SERPL-SCNC: 24 MMOL/L (ref 20–33)
COLOR UR: ABNORMAL
CREAT SERPL-MCNC: 1.38 MG/DL (ref 0.5–1.4)
EKG IMPRESSION: NORMAL
EOSINOPHIL # BLD AUTO: 0.09 K/UL (ref 0–0.51)
EOSINOPHIL NFR BLD: 1.9 % (ref 0–6.9)
ERYTHROCYTE [DISTWIDTH] IN BLOOD BY AUTOMATED COUNT: 50.3 FL (ref 35.9–50)
GLOBULIN SER CALC-MCNC: 2.4 G/DL (ref 1.9–3.5)
GLUCOSE BLD-MCNC: 160 MG/DL (ref 65–99)
GLUCOSE SERPL-MCNC: 232 MG/DL (ref 65–99)
GLUCOSE UR STRIP.AUTO-MCNC: NEGATIVE MG/DL
HCT VFR BLD AUTO: 46.5 % (ref 42–52)
HGB BLD-MCNC: 16 G/DL (ref 14–18)
IMM GRANULOCYTES # BLD AUTO: 0.03 K/UL (ref 0–0.11)
IMM GRANULOCYTES NFR BLD AUTO: 0.6 % (ref 0–0.9)
INR PPP: 3.28 (ref 0.87–1.13)
KETONES UR STRIP.AUTO-MCNC: ABNORMAL MG/DL
LEUKOCYTE ESTERASE UR QL STRIP.AUTO: NEGATIVE
LIPASE SERPL-CCNC: 16 U/L (ref 11–82)
LYMPHOCYTES # BLD AUTO: 0.74 K/UL (ref 1–4.8)
LYMPHOCYTES NFR BLD: 15.4 % (ref 22–41)
MAGNESIUM SERPL-MCNC: 1.9 MG/DL (ref 1.5–2.5)
MCH RBC QN AUTO: 32.9 PG (ref 27–33)
MCHC RBC AUTO-ENTMCNC: 34.4 G/DL (ref 33.7–35.3)
MCV RBC AUTO: 95.5 FL (ref 81.4–97.8)
MICRO URNS: ABNORMAL
MONOCYTES # BLD AUTO: 0.47 K/UL (ref 0–0.85)
MONOCYTES NFR BLD AUTO: 9.8 % (ref 0–13.4)
NEUTROPHILS # BLD AUTO: 3.47 K/UL (ref 1.82–7.42)
NEUTROPHILS NFR BLD: 71.9 % (ref 44–72)
NITRITE UR QL STRIP.AUTO: NEGATIVE
NRBC # BLD AUTO: 0 K/UL
NRBC BLD-RTO: 0 /100 WBC
PH UR STRIP.AUTO: 5.5 [PH]
PLATELET # BLD AUTO: 137 K/UL (ref 164–446)
PMV BLD AUTO: 10.2 FL (ref 9–12.9)
POTASSIUM SERPL-SCNC: 4 MMOL/L (ref 3.6–5.5)
PROT SERPL-MCNC: 6.1 G/DL (ref 6–8.2)
PROT UR QL STRIP: NEGATIVE MG/DL
PROTHROMBIN TIME: 33.1 SEC (ref 12–14.6)
RBC # BLD AUTO: 4.87 M/UL (ref 4.7–6.1)
RBC UR QL AUTO: NEGATIVE
SODIUM SERPL-SCNC: 137 MMOL/L (ref 135–145)
SP GR UR STRIP.AUTO: 1.02
TROPONIN I SERPL-MCNC: 0.01 NG/ML (ref 0–0.04)
UROBILINOGEN UR STRIP.AUTO-MCNC: 1 MG/DL
WBC # BLD AUTO: 4.8 K/UL (ref 4.8–10.8)

## 2018-04-24 PROCEDURE — 93010 ELECTROCARDIOGRAM REPORT: CPT | Performed by: INTERNAL MEDICINE

## 2018-04-24 PROCEDURE — 700111 HCHG RX REV CODE 636 W/ 250 OVERRIDE (IP): Performed by: EMERGENCY MEDICINE

## 2018-04-24 PROCEDURE — 80053 COMPREHEN METABOLIC PANEL: CPT

## 2018-04-24 PROCEDURE — 85025 COMPLETE CBC W/AUTO DIFF WBC: CPT

## 2018-04-24 PROCEDURE — 71045 X-RAY EXAM CHEST 1 VIEW: CPT

## 2018-04-24 PROCEDURE — 85610 PROTHROMBIN TIME: CPT

## 2018-04-24 PROCEDURE — 99285 EMERGENCY DEPT VISIT HI MDM: CPT

## 2018-04-24 PROCEDURE — G0378 HOSPITAL OBSERVATION PER HR: HCPCS

## 2018-04-24 PROCEDURE — 84484 ASSAY OF TROPONIN QUANT: CPT | Mod: 91

## 2018-04-24 PROCEDURE — 83735 ASSAY OF MAGNESIUM: CPT

## 2018-04-24 PROCEDURE — 83880 ASSAY OF NATRIURETIC PEPTIDE: CPT

## 2018-04-24 PROCEDURE — 82962 GLUCOSE BLOOD TEST: CPT

## 2018-04-24 PROCEDURE — 94760 N-INVAS EAR/PLS OXIMETRY 1: CPT

## 2018-04-24 PROCEDURE — A9270 NON-COVERED ITEM OR SERVICE: HCPCS | Performed by: STUDENT IN AN ORGANIZED HEALTH CARE EDUCATION/TRAINING PROGRAM

## 2018-04-24 PROCEDURE — 96374 THER/PROPH/DIAG INJ IV PUSH: CPT

## 2018-04-24 PROCEDURE — 83690 ASSAY OF LIPASE: CPT

## 2018-04-24 PROCEDURE — 96372 THER/PROPH/DIAG INJ SC/IM: CPT | Mod: XU

## 2018-04-24 PROCEDURE — 93005 ELECTROCARDIOGRAM TRACING: CPT | Performed by: STUDENT IN AN ORGANIZED HEALTH CARE EDUCATION/TRAINING PROGRAM

## 2018-04-24 PROCEDURE — 700101 HCHG RX REV CODE 250: Performed by: STUDENT IN AN ORGANIZED HEALTH CARE EDUCATION/TRAINING PROGRAM

## 2018-04-24 PROCEDURE — 81003 URINALYSIS AUTO W/O SCOPE: CPT

## 2018-04-24 PROCEDURE — 700105 HCHG RX REV CODE 258: Performed by: EMERGENCY MEDICINE

## 2018-04-24 PROCEDURE — 93005 ELECTROCARDIOGRAM TRACING: CPT | Performed by: EMERGENCY MEDICINE

## 2018-04-24 PROCEDURE — 700102 HCHG RX REV CODE 250 W/ 637 OVERRIDE(OP): Performed by: STUDENT IN AN ORGANIZED HEALTH CARE EDUCATION/TRAINING PROGRAM

## 2018-04-24 RX ORDER — WARFARIN SODIUM 5 MG/1
5 TABLET ORAL
Status: DISCONTINUED | OUTPATIENT
Start: 2018-04-25 | End: 2018-04-25 | Stop reason: HOSPADM

## 2018-04-24 RX ORDER — DEXTROSE MONOHYDRATE 25 G/50ML
25 INJECTION, SOLUTION INTRAVENOUS
Status: DISCONTINUED | OUTPATIENT
Start: 2018-04-24 | End: 2018-04-25 | Stop reason: HOSPADM

## 2018-04-24 RX ORDER — ACETAMINOPHEN 500 MG
500-1000 TABLET ORAL EVERY 6 HOURS PRN
COMMUNITY
End: 2020-02-28

## 2018-04-24 RX ORDER — ACETAMINOPHEN 325 MG/1
650 TABLET ORAL EVERY 6 HOURS PRN
Status: DISCONTINUED | OUTPATIENT
Start: 2018-04-24 | End: 2018-04-25 | Stop reason: HOSPADM

## 2018-04-24 RX ORDER — INDOMETHACIN 50 MG/1
50 CAPSULE ORAL 3 TIMES DAILY PRN
Status: DISCONTINUED | OUTPATIENT
Start: 2018-04-24 | End: 2018-04-24

## 2018-04-24 RX ORDER — WARFARIN SODIUM 4 MG/1
4 TABLET ORAL
Status: DISCONTINUED | OUTPATIENT
Start: 2018-04-25 | End: 2018-04-25 | Stop reason: HOSPADM

## 2018-04-24 RX ORDER — WARFARIN SODIUM 4 MG/1
4 TABLET ORAL DAILY
COMMUNITY
End: 2019-04-16

## 2018-04-24 RX ORDER — AMOXICILLIN 250 MG
2 CAPSULE ORAL 2 TIMES DAILY
Status: DISCONTINUED | OUTPATIENT
Start: 2018-04-24 | End: 2018-04-25 | Stop reason: HOSPADM

## 2018-04-24 RX ORDER — BISACODYL 10 MG
10 SUPPOSITORY, RECTAL RECTAL
Status: DISCONTINUED | OUTPATIENT
Start: 2018-04-24 | End: 2018-04-25 | Stop reason: HOSPADM

## 2018-04-24 RX ORDER — SODIUM CHLORIDE 9 MG/ML
INJECTION, SOLUTION INTRAVENOUS CONTINUOUS
Status: DISCONTINUED | OUTPATIENT
Start: 2018-04-24 | End: 2018-04-25 | Stop reason: HOSPADM

## 2018-04-24 RX ORDER — INDOMETHACIN 50 MG/1
50 CAPSULE ORAL 3 TIMES DAILY PRN
Status: ON HOLD | COMMUNITY
End: 2019-04-14

## 2018-04-24 RX ORDER — WARFARIN SODIUM 4 MG/1
4 TABLET ORAL
Status: COMPLETED | OUTPATIENT
Start: 2018-04-24 | End: 2018-04-25

## 2018-04-24 RX ORDER — SODIUM CHLORIDE AND POTASSIUM CHLORIDE 150; 900 MG/100ML; MG/100ML
INJECTION, SOLUTION INTRAVENOUS CONTINUOUS
Status: DISCONTINUED | OUTPATIENT
Start: 2018-04-24 | End: 2018-04-25 | Stop reason: HOSPADM

## 2018-04-24 RX ORDER — ONDANSETRON 2 MG/ML
4 INJECTION INTRAMUSCULAR; INTRAVENOUS ONCE
Status: COMPLETED | OUTPATIENT
Start: 2018-04-24 | End: 2018-04-24

## 2018-04-24 RX ORDER — TAMSULOSIN HYDROCHLORIDE 0.4 MG/1
0.4 CAPSULE ORAL EVERY EVENING
Status: DISCONTINUED | OUTPATIENT
Start: 2018-04-24 | End: 2018-04-24

## 2018-04-24 RX ORDER — POLYETHYLENE GLYCOL 3350 17 G/17G
1 POWDER, FOR SOLUTION ORAL
Status: DISCONTINUED | OUTPATIENT
Start: 2018-04-24 | End: 2018-04-25 | Stop reason: HOSPADM

## 2018-04-24 RX ADMIN — INSULIN HUMAN 1 UNITS: 100 INJECTION, SOLUTION PARENTERAL at 20:56

## 2018-04-24 RX ADMIN — SODIUM CHLORIDE: 9 INJECTION, SOLUTION INTRAVENOUS at 12:22

## 2018-04-24 RX ADMIN — ACETAMINOPHEN 650 MG: 325 TABLET, FILM COATED ORAL at 20:54

## 2018-04-24 RX ADMIN — POTASSIUM CHLORIDE AND SODIUM CHLORIDE: 900; 150 INJECTION, SOLUTION INTRAVENOUS at 18:55

## 2018-04-24 RX ADMIN — ONDANSETRON HYDROCHLORIDE 4 MG: 2 INJECTION, SOLUTION INTRAMUSCULAR; INTRAVENOUS at 12:22

## 2018-04-24 RX ADMIN — METOPROLOL TARTRATE 25 MG: 25 TABLET, FILM COATED ORAL at 20:55

## 2018-04-24 ASSESSMENT — LIFESTYLE VARIABLES
EVER_SMOKED: YES
ALCOHOL_USE: NO

## 2018-04-24 ASSESSMENT — PAIN SCALES - GENERAL
PAINLEVEL_OUTOF10: 7
PAINLEVEL_OUTOF10: 0

## 2018-04-24 ASSESSMENT — PATIENT HEALTH QUESTIONNAIRE - PHQ9
2. FEELING DOWN, DEPRESSED, IRRITABLE, OR HOPELESS: NOT AT ALL
1. LITTLE INTEREST OR PLEASURE IN DOING THINGS: NOT AT ALL
SUM OF ALL RESPONSES TO PHQ9 QUESTIONS 1 AND 2: 0

## 2018-04-24 NOTE — ED PROVIDER NOTES
ED Provider Note    Scribed for Donita Santana M.D. by Nelia Watts. 4/24/2018, 12:03 PM.    Primary care provider: Ivonne Wild M.D.  Means of arrival: ambulance  History obtained from: patient  History limited by: none    CHIEF COMPLAINT  Chief Complaint   Patient presents with   • Dizziness   • Nausea       HPI  Morales Olivier is a 62 y.o. Male with a history of atrial fibrillation, CAD, valve disease, Type 2 diabetes, hypertension, and renal dysfunction who presents to the Emergency Department by EMS from [facility] for evaluation of lightheadedness and nausea that began around 945AM this morning with associated diaphoresis and palpitations. Patient reports that he felt relatively normal this morning, and the days prior with no recent fevers or cough. INR was measured at 3.1 at [facility] this morning when patient went for evaluation of his symptoms. Patient takes daily Coumadin to prevent DVTs. He has a pacemaker in place, has undergone 2 valve replacements and has a history of atrial fibrillation. No history of hyperlipidemia. Dr. Saba is the patient's PCP up at [facility], and he follow with Dr. Yancey, Cardiology with a consult scheduled for next month.  No complaints of difficulty breathing, fever, cough, abdominal pain, sensation to defecate when symptoms began.    REVIEW OF SYSTEMS  Pertinent positives include lightheadedness, diaphoresis, palpitations, nausea. Pertinent negatives include no difficulty breathing, fever, cough, abdominal pain, sensation to defecate. As above, all other systems reviewed and are negative.   See HPI for further details.     PAST MEDICAL HISTORY  Past Medical History:   Diagnosis Date   • Atrial fibrillation (CMS-HCC)    • Bronchitis    • CAD (coronary artery disease)    • Gout    • Heart valve disease     mitral valve replacement (bovine)   • Hypertension    • Pacemaker    • Personal history of venous thrombosis and embolism 2009    DVT right leg   • Renal disorder      kidney stones   • Type II or unspecified type diabetes mellitus without mention of complication, not stated as uncontrolled     DM type II- diet controlled       SURGICAL HISTORY  Past Surgical History:   Procedure Laterality Date   • MITRAL VALVE REPLACE  3/8/2017    Procedure: MITRAL VALVE REPLACE-REDO;  Surgeon: Cornelia Wright M.D.;  Location: SURGERY [facility];  Service:    • KD  3/8/2017    Procedure: KD;  Surgeon: Cornelia Wright M.D.;  Location: SURGERY [facility];  Service:    • IRRIGATION & DEBRIDEMENT GENERAL  7/20/2009    Performed by MICHEL URBINA at [facility]   • WIDE EXCISION  7/20/2009    Performed by MICHEL URBINA at SURGERY [facility]   • ANGIOGRAM  7/4/2009    Performed by MICHEL URBINA at [facility]   • CATH REMOVAL  7/4/2009    Performed by MICHEL URBINA at [facility]   • THROMBECTOMY  7/4/2009    Performed by MICHEL URBINA at [facility]   • EMBOLECTOMY  7/4/2009    Performed by MICHEL URBINA at SURGERY [facility]   • ANGIOGRAM  7/3/2009    Performed by MORGAN WARD at SURGERY [facility]   • MITRAL VALVE REPLACE  3/14/08    Performed by CORNELIA WRIGHT at SURGERY [facility]   • MAZE PROCEDURE  3/14/08    Performed by CORNELIA WRIGHT at [facility]   • OTHER CARDIAC SURGERY  2008    mitral valve replacement   • OTHER ABDOMINAL SURGERY      imbulica repair        SOCIAL HISTORY  Social History   Substance Use Topics   • Smoking status: Former Smoker     Quit date: 1/1/1981   • Smokeless tobacco: Never Used   • Alcohol use No      History   Drug Use No       FAMILY HISTORY  No pertinent family history    CURRENT MEDICATIONS  No current facility-administered medications on file prior to encounter.      Current Outpatient Prescriptions on File Prior to Encounter   Medication Sig Dispense Refill   • warfarin (COUMADIN) 5 MG Tab Take 4-5 mg by mouth every day. 4mg one day,  "5mg the next and so on   Patient on 5mg for tonight     • tamsulosin (FLOMAX) 0.4 MG capsule Take 0.4 mg by mouth every day.     • metoprolol (LOPRESSOR) 25 MG TABS Take 1 Tab by mouth 2 Times a Day. 60 Tab 11       ALLERGIES  No Known Allergies    PHYSICAL EXAM  VITAL SIGNS: /91   Pulse 93   Temp 35.9 °C (96.7 °F)   Resp 15   Ht 1.778 m (5' 10\")   Wt 111 kg (244 lb 9.6 oz)   BMI 35.10 kg/m²   Vitals reviewed.    Consitutional: Well-developed, morbidly obese. Negative for: distress.  HENT: Normocephalic, right external ear normal, left external ear normal, oropharynx clear and moist.  Eyes: Conjunctivae normal, extraocular movements normal. Negative for: discharge in right and left eye, icterus.  Neck: Range of motion normal, supple. Negative for cervical adenopathy.  Cardiovascular: Normal rate, irregularly irregular rhythm, intact distal pulses. Negative for: murmur, rub, gallop.  Pulmonary/Chest Wall: Effort normal, breath sounds normal. Negative for: respiratory distress, wheezes, rales, rhonchi.   Abdominal: Soft, bowel sounds normal. Negative for: distention, tenderness, rebound, guarding. Sot palpable periumbilical hernia  Musculoskeletal: Normal range of motion. Trace pitting edema bilateral lower legs, mild tenderness bilateral LE.  Neurological: Alert and oriented x3. No focal deficits.  Skin: Warm, sweaty. Negative for rash.  Psych: Mood/affect normal, behavior normal, judgment normal.      DIAGNOSTIC STUDIES / PROCEDURES    LABS  Results for orders placed or performed during the hospital encounter of 04/24/18   CBC WITH DIFFERENTIAL   Result Value Ref Range    WBC 4.8 4.8 - 10.8 K/uL    RBC 4.87 4.70 - 6.10 M/uL    Hemoglobin 16.0 14.0 - 18.0 g/dL    Hematocrit 46.5 42.0 - 52.0 %    MCV 95.5 81.4 - 97.8 fL    MCH 32.9 27.0 - 33.0 pg    MCHC 34.4 33.7 - 35.3 g/dL    RDW 50.3 (H) 35.9 - 50.0 fL    Platelet Count 137 (L) 164 - 446 K/uL    MPV 10.2 9.0 - 12.9 fL    Neutrophils-Polys 71.90 44.00 - " 72.00 %    Lymphocytes 15.40 (L) 22.00 - 41.00 %    Monocytes 9.80 0.00 - 13.40 %    Eosinophils 1.90 0.00 - 6.90 %    Basophils 0.40 0.00 - 1.80 %    Immature Granulocytes 0.60 0.00 - 0.90 %    Nucleated RBC 0.00 /100 WBC    Neutrophils (Absolute) 3.47 1.82 - 7.42 K/uL    Lymphs (Absolute) 0.74 (L) 1.00 - 4.80 K/uL    Monos (Absolute) 0.47 0.00 - 0.85 K/uL    Eos (Absolute) 0.09 0.00 - 0.51 K/uL    Baso (Absolute) 0.02 0.00 - 0.12 K/uL    Immature Granulocytes (abs) 0.03 0.00 - 0.11 K/uL    NRBC (Absolute) 0.00 K/uL   COMP METABOLIC PANEL   Result Value Ref Range    Sodium 137 135 - 145 mmol/L    Potassium 4.0 3.6 - 5.5 mmol/L    Chloride 105 96 - 112 mmol/L    Co2 24 20 - 33 mmol/L    Anion Gap 8.0 0.0 - 11.9    Glucose 232 (H) 65 - 99 mg/dL    Bun 13 8 - 22 mg/dL    Creatinine 1.38 0.50 - 1.40 mg/dL    Calcium 8.8 8.5 - 10.5 mg/dL    AST(SGOT) 16 12 - 45 U/L    ALT(SGPT) 9 2 - 50 U/L    Alkaline Phosphatase 59 30 - 99 U/L    Total Bilirubin 0.9 0.1 - 1.5 mg/dL    Albumin 3.7 3.2 - 4.9 g/dL    Total Protein 6.1 6.0 - 8.2 g/dL    Globulin 2.4 1.9 - 3.5 g/dL    A-G Ratio 1.5 g/dL   LIPASE   Result Value Ref Range    Lipase 16 11 - 82 U/L   TROPONIN   Result Value Ref Range    Troponin I 0.01 0.00 - 0.04 ng/mL   BTYPE NATRIURETIC PEPTIDE   Result Value Ref Range    B Natriuretic Peptide 40 0 - 100 pg/mL   URINALYSIS (UA)   Result Value Ref Range    Color DK Yellow     Character Clear     Specific Gravity 1.020 <1.035    Ph 5.5 5.0 - 8.0    Glucose Negative Negative mg/dL    Ketones Trace (A) Negative mg/dL    Protein Negative Negative mg/dL    Bilirubin Negative Negative    Urobilinogen, Urine 1.0 Negative    Nitrite Negative Negative    Leukocyte Esterase Negative Negative    Occult Blood Negative Negative    Micro Urine Req see below    MAGNESIUM   Result Value Ref Range    Magnesium 1.9 1.5 - 2.5 mg/dL   ESTIMATED GFR   Result Value Ref Range    GFR If African American >60 >60 mL/min/1.73 m 2    GFR If Non African  American 52 (A) >60 mL/min/1.73 m 2   TROPONIN   Result Value Ref Range    Troponin I 0.01 0.00 - 0.04 ng/mL   EKG (NOW)   Result Value Ref Range    Report       [facility] Emergency Dept.    Test Date:  2018  Pt Name:    AILYN MCINTOSH                 Department: ER  MRN:        [id number]                      Room:       [facility]  Gender:     Male                         Technician: [id number]  :        1955                   Requested By:TOÑA LYNCH  Order #:    [id number]                    Reading MD:    Measurements  Intervals                                Axis  Rate:       87                           P:  NH:                                      QRS:        71  QRSD:       88                           T:          -69  QT:         380  QTc:        457    Interpretive Statements  AFIB/FLUT AND V-PACED COMPLEXES  NO FURTHER RHYTHM ANALYSIS ATTEMPTED DUE TO PACED RHYTHM  NONSPECIFIC REPOL ABNORMALITY, DIFFUSE LEADS  PROLONGED QT INTERVAL  Compared to ECG 2018 17:02:34  Prolonged QT interval now present  Ectopic atrial rhythm no longer present  Possible ischemia no longer present         All labs reviewed by me.    EKG Interpretation:  12-lead EKG by my interpretation shows atrial fibrillation with the paced complexes. Likely left bundle branch block. Similar underlying morphology seen on 18    COURSE & MEDICAL DECISION MAKING  Nursing notes, VS, PMSFHx reviewed in chart.    12:03 PM Patient seen and examined at bedside.The patient presents with an extensive cardiac/vascular history via EMS from his PCP at [facility] complaining of lightheadedness and nausea onset around 945AM this morning with associated diaphoresis and palpitation beginning shortly there after and the differential diagnosis includes but is not limited to arrhythmia, MI, orthostasis. Ordered magnesium, CBC, CMP, lipase, troponin, BNP, UA, and EKG. Patient will be treated with 4mg IV Zofran for his symptoms.  I  informed the patient that labs and an EKG would be completed to rule out any acute origins of his symptoms and we would treat his nausea as well. Patient understands and agrees. The patient will be resuscitated with NS IV for lightheadedness. Patient is on Coumadin, so no aspirin given    2:33 PM I re-evaluated patient at bedside who reports minimal to no improvement to his symptoms. I explained that his results are all looking relatively normal. I explained that we need one more cardiac enzyme before I consult with a Cardiologist to discuss the next appropriate treatment steps. Patient understands and agrees.    4:14 PM I re-evaluated patient at bedside. He still reports no improvement to his symptoms and would like to be admitted for further evaluation. Paged [facility].    4:37 PM Spoke with Dr. Reddy [facility], about the patient's condition. Agrees to admit the patient.    Response to IV NS: improvement of lightheadedness    DISPOSITION:  Patient will be admitted to [facility] in guarded condition.    FINAL IMPRESSION  1. Near syncope    2. Atrial fibrillation, unspecified type (CMS-HCC)    3. Chronic anticoagulation    4. Dehydration          Nelia JARRETT (Scribe), am scribing for, and in the presence of, Donita Santana M.D..    Electronically signed by: Nelia Watts (Trisha), 4/24/2018    Donita JARRETT M.D. personally performed the services described in this documentation, as scribed by Nelia Watts in my presence, and it is both accurate and complete.    The note accurately reflects work and decisions made by me.  Donita Santana  4/24/2018  7:32 PM

## 2018-04-24 NOTE — ED TRIAGE NOTES
Brought in by ems from [facility] for feeling dizzy and nausea x1/2hrs pta. cbg 215. Given 4mg zofran enroute.

## 2018-04-24 NOTE — ED NOTES
Med rec updated and complete  Allergies reviewed  Pt reports no antibiotics in the last 30 days.  Pt reports no vitamins.

## 2018-04-25 ENCOUNTER — PATIENT OUTREACH (OUTPATIENT)
Dept: HEALTH INFORMATION MANAGEMENT | Facility: OTHER | Age: 63
End: 2018-04-25

## 2018-04-25 VITALS
OXYGEN SATURATION: 100 % | RESPIRATION RATE: 16 BRPM | TEMPERATURE: 97 F | HEART RATE: 80 BPM | BODY MASS INDEX: 34.84 KG/M2 | SYSTOLIC BLOOD PRESSURE: 123 MMHG | WEIGHT: 243.39 LBS | DIASTOLIC BLOOD PRESSURE: 85 MMHG | HEIGHT: 70 IN

## 2018-04-25 LAB
EKG IMPRESSION: NORMAL
GLUCOSE BLD-MCNC: 88 MG/DL (ref 65–99)
INR PPP: 3.13 (ref 0.87–1.13)
PROTHROMBIN TIME: 31.9 SEC (ref 12–14.6)

## 2018-04-25 PROCEDURE — 700102 HCHG RX REV CODE 250 W/ 637 OVERRIDE(OP): Performed by: STUDENT IN AN ORGANIZED HEALTH CARE EDUCATION/TRAINING PROGRAM

## 2018-04-25 PROCEDURE — 700101 HCHG RX REV CODE 250: Performed by: STUDENT IN AN ORGANIZED HEALTH CARE EDUCATION/TRAINING PROGRAM

## 2018-04-25 PROCEDURE — 85610 PROTHROMBIN TIME: CPT

## 2018-04-25 PROCEDURE — G0378 HOSPITAL OBSERVATION PER HR: HCPCS

## 2018-04-25 PROCEDURE — A9270 NON-COVERED ITEM OR SERVICE: HCPCS | Performed by: STUDENT IN AN ORGANIZED HEALTH CARE EDUCATION/TRAINING PROGRAM

## 2018-04-25 PROCEDURE — 82962 GLUCOSE BLOOD TEST: CPT

## 2018-04-25 RX ADMIN — POTASSIUM CHLORIDE AND SODIUM CHLORIDE: 900; 150 INJECTION, SOLUTION INTRAVENOUS at 01:30

## 2018-04-25 RX ADMIN — ACETAMINOPHEN 650 MG: 325 TABLET, FILM COATED ORAL at 09:10

## 2018-04-25 RX ADMIN — POTASSIUM CHLORIDE AND SODIUM CHLORIDE: 900; 150 INJECTION, SOLUTION INTRAVENOUS at 09:00

## 2018-04-25 RX ADMIN — WARFARIN SODIUM 4 MG: 4 TABLET ORAL at 01:30

## 2018-04-25 RX ADMIN — ACETAMINOPHEN 650 MG: 325 TABLET, FILM COATED ORAL at 02:05

## 2018-04-25 RX ADMIN — METOPROLOL TARTRATE 25 MG: 25 TABLET, FILM COATED ORAL at 08:59

## 2018-04-25 ASSESSMENT — PAIN SCALES - GENERAL
PAINLEVEL_OUTOF10: 6
PAINLEVEL_OUTOF10: 5

## 2018-04-25 NOTE — PROGRESS NOTES
[facility] PROGRESS NOTE     Attending: Dr. Halina Lala    Resident: Dr. Sherine Martell    PATIENT: Morales Olivier; [id number]; 1955    ID: 62 y.o. male admitted for near syncope, r/o cardiac etiology    SUBJECTIVE: No acute events overnight, patient continues to feel dizzy but greatly improved; no nausea or diaphoresis. He has been ambulating to restroom all night without issue. Currently in NSR, telemetry with v-paced rhythm.  Repeat troponin negative and EKG w/o ischemia, no changes from previous study.    OBJECTIVE:     Vitals:    04/24/18 1820 04/24/18 1943 04/24/18 2232 04/25/18 0409   BP: 155/103 135/87 115/74 127/81   Pulse:  80 80 80   Resp: 20 17 16 15   Temp: 36.3 °C (97.4 °F) 36.4 °C (97.6 °F) 36.4 °C (97.6 °F) 36.9 °C (98.4 °F)   SpO2: 95% 96% 95% 96%   Weight: 110.4 kg (243 lb 6.2 oz)      Height:         No intake or output data in the 24 hours ending 04/25/18 0646    PE:  General: No acute distress, resting comfortably in bed.  HEENT: NC/AT. PERRLA. EOMI. MMM, poor dentition  Cardiovascular: RRR with no M/R/G.  Respiratory: Symmetrical chest. CTAB with no W/R/R  Abdomen: soft, NT/ND, reducible umbilical hernia, +BS   EXT:  BIANCHI, 5/5 strength, No C/C/E 2+ pulses   Neuro: non focal with no numbness, tingling or changes in sensation    LABS:  Recent Labs      04/24/18   1112   WBC  4.8   RBC  4.87   HEMOGLOBIN  16.0   HEMATOCRIT  46.5   MCV  95.5   MCH  32.9   RDW  50.3*   PLATELETCT  137*   MPV  10.2   NEUTSPOLYS  71.90   LYMPHOCYTES  15.40*   MONOCYTES  9.80   EOSINOPHILS  1.90   BASOPHILS  0.40     Recent Labs      04/24/18   1112   SODIUM  137   POTASSIUM  4.0   CHLORIDE  105   CO2  24   BUN  13   CREATININE  1.38   CALCIUM  8.8   MAGNESIUM  1.9   ALBUMIN  3.7     Estimated GFR/CRCL = Estimated Creatinine Clearance: 68.2 mL/min (by C-G formula based on SCr of 1.38 mg/dL).  Recent Labs      04/24/18   1112  04/24/18   2051   GLUCOSE  232*   --    POCGLUCOSE   --   160*     Recent Labs       04/24/18   1112  04/24/18   2100  04/25/18   0430   ASTSGOT  16   --    --    ALTSGPT  9   --    --    TBILIRUBIN  0.9   --    --    ALKPHOSPHAT  59   --    --    GLOBULIN  2.4   --    --    INR   --   3.28*  3.13*     Recent Labs      04/24/18   1112  04/24/18   1525  04/24/18   2100   TROPONINI  0.01  0.01  0.01   BNPBTYPENAT  40   --    --            Invalid input(s): [id number]  Recent Labs      04/24/18   2100  04/25/18   0430   INR  3.28*  3.13*       MICROBIOLOGY: None    IMAGING: None    MEDS:  Current Facility-Administered Medications   Medication Last Dose   • NS infusion Stopped at 04/24/18 1344   • senna-docusate (PERICOLACE or SENOKOT S) 8.6-50 MG per tablet 2 Tab      And   • polyethylene glycol/lytes (MIRALAX) PACKET 1 Packet      And   • magnesium hydroxide (MILK OF MAGNESIA) suspension 30 mL      And   • bisacodyl (DULCOLAX) suppository 10 mg     • 0.9 % NaCl with KCl 20 mEq infusion     • acetaminophen (TYLENOL) tablet 650 mg 650 mg at 04/25/18 0205   • insulin regular (HUMULIN R) injection 1-6 Units 1 Units at 04/24/18 2056    And   • glucose 4 g chewable tablet 16 g      And   • dextrose 50% (D50W) injection 25 mL     • metoprolol (LOPRESSOR) tablet 25 mg 25 mg at 04/24/18 2055   • MD ALERT... warfarin (COUMADIN) per pharmacy protocol     • warfarin (COUMADIN) tablet 5 mg      And   • warfarin (COUMADIN) tablet 4 mg         PROBLEM LIST:  No problems updated.    ASSESSMENT/PLAN: Morales Olivier is a 62 y.o. male with significant cardiac history who was admitted for observation for near syncope and cardiac etiology rule out.      1. Near Syncope  - likely related to atrial fibrillation and possible dehydration; improved this morning  - hx of chest pain work-up in 2/2018 with negative NM stress test  - no neurological deficits on exam, making CVA less likely  - EKG w/o ischemic changes, troponins negative x 3, electrolytes wnl, CXR stable  - telemetry  - NS + 20KCL at maintenance rate  -  monitor I/O's  - continuous pulse ox  - Tylenol PRN pain  - spoke w/ Dr. Yancey's RN who confirmed patient has appt 5/18/18 however has not had pacer interrogated since 6/2017; [facility] to interrogate pacer today and if normal, will d/c patient home.      2. A-fib/SSS  - INR this AM 3.13, within goal 2.5-3.5  - currently in NSR, rate-controlled w/ Metoprolol 25mg BID for at least 2 years; anticoagulated w/ Coumadin INR goal 2.5-3.5  - Metoprolol 25mg BID  - Coumadin per pharmacy dosing     3. Hx of DM  - most recent HbA1c in 2/2018 6.2%  - no medications at home  - LDISS - required 1 unit overnight  - hypoglycemia protocol     4. Gout  - currently having some pain this morning, relieved now  - will hold indomethacin   - Tylenol PRN     5. HTN  - continue home metoprolol     6. BPH  - continue home tamsulosin     PPX: on coumadin baseline, bowel protocol  Diet: Cardiac   Abx: none  Lines: PIV R forearm  Dispo: Observation, likely d/c today if pacer interrogation within normal limits. Patient to keep scheduled appt with Cardiology 5/18/18 and f/u with [facility] within 1 week of discharge. Continue home medications as prescribed.  Code: FULL    Addendum: Spoke with Mikhail from [facility] who interrogated pacer, said it was working well and patient cleared; discussed return precautions with patient, namely chest pain, shortness of breath, or any further concerns. Patient voiced understanding and agreeable to discharge.

## 2018-04-25 NOTE — PROGRESS NOTES
"Pt up to bathroom, pt refused to wear nonskid socks while ambulating to bathroom. Pt reminded importance of wearing the socks, pt states \"I don't need any socks I'm just walking to the restroom\". RN notified.   "

## 2018-04-25 NOTE — DISCHARGE INSTRUCTIONS
Discharge Instructions    Discharged to home by bus with self. Discharged via wheelchair, hospital escort: Yes.  Special equipment needed: Not Applicable    Be sure to schedule a follow-up appointment with your primary care doctor or any specialists as instructed.     Discharge Plan:   Diet Plan: Discussed  Activity Level: Discussed  Confirmed Follow up Appointment: Patient to Call and Schedule Appointment  Confirmed Symptoms Management: Discussed  Medication Reconciliation Updated: Yes  Influenza Vaccine Indication: Patient Refuses    I understand that a diet low in cholesterol, fat, and sodium is recommended for good health. Unless I have been given specific instructions below for another diet, I accept this instruction as my diet prescription.   Other diet: Heart Healthy     Special Instructions: None    · Is patient discharged on Warfarin / Coumadin?   No     Depression / Suicide Risk    As you are discharged from this [facility] facility, it is important to learn how to keep safe from harming yourself.    Recognize the warning signs:  · Abrupt changes in personality, positive or negative- including increase in energy   · Giving away possessions  · Change in eating patterns- significant weight changes-  positive or negative  · Change in sleeping patterns- unable to sleep or sleeping all the time   · Unwillingness or inability to communicate  · Depression  · Unusual sadness, discouragement and loneliness  · Talk of wanting to die  · Neglect of personal appearance   · Rebelliousness- reckless behavior  · Withdrawal from people/activities they love  · Confusion- inability to concentrate     If you or a loved one observes any of these behaviors or has concerns about self-harm, here's what you can do:  · Talk about it- your feelings and reasons for harming yourself  · Remove any means that you might use to hurt yourself (examples: pills, rope, extension cords, firearm)  · Get professional help from the community  (Mental Health, Substance Abuse, psychological counseling)  · Do not be alone:Call your Safe Contact- someone whom you trust who will be there for you.  · Call your local CRISIS HOTLINE 958-2632 or 776-529-6326  · Call your local Children's Mobile Crisis Response Team [place] (769) 552-4161 or www.[employer]  · Call the toll free National Suicide Prevention Hotlines   · National Suicide Prevention Lifeline 463-346-QFDL (6214)  · [employer] Hope Line Network 800-SUICIDE (730-8665)        Near-Syncope  Introduction  Near-syncope is when you suddenly get weak or dizzy, or you feel like you might pass out (faint). During an episode of near-syncope, you may:  · Feel dizzy or light-headed.  · Feel sick to your stomach (nauseous).  · See all white or all black.  · Have cold, clammy skin.  If you passed out, get help right away.Call your local emergency services (722 in the [place] Do not drive yourself to the hospital.  Follow these instructions at home:  Pay attention to any changes in your symptoms. Take these actions to help with your condition:  · Have someone stay with you until you feel stable.  · Do not drive, use machinery, or play sports until your doctor says it is okay.  · Keep all follow-up visits as told by your doctor. This is important.  · If you start to feel like you might pass out, lie down right away and raise (elevate) your feet above the level of your heart. Breathe deeply and steadily. Wait until all of the symptoms are gone.  · Drink enough fluid to keep your pee (urine) clear or pale yellow.  · If you are taking blood pressure or heart medicine, get up slowly and spend many minutes getting ready to sit and then stand. This can help with dizziness.  · Take over-the-counter and prescription medicines only as told by your doctor.  Get help right away if:  · You have a very bad headache.  · You have unusual pain in your chest, tummy, or back.  · You are bleeding from your mouth or  rectum.  · You have black or tarry poop (stool).  · You have a very fast or uneven heartbeat (palpitations).  · You pass out one time or more than once.  · You have jerky movements that you cannot control (seizure).  · You are confused.  · You have trouble walking.  · You are very weak.  · You have vision problems.  These symptoms may be an emergency. Do not wait to see if the symptoms will go away. Get medical help right away. Call your local emergency services (911 in the [place] Do not drive yourself to the hospital.   This information is not intended to replace advice given to you by your health care provider. Make sure you discuss any questions you have with your health care provider.  Document Released: 06/05/2009 Document Revised: 05/25/2017 Document Reviewed: 08/31/2016  © 2017 [employer]

## 2018-04-25 NOTE — PROGRESS NOTES
Inpatient Anticoagulation Service Note    Date: 4/24/2018  Reason for Anticoagulation: Atrial Fibrillation, Bioprosthetic Valve Replacement, Deep Vein Thrombosis        Hemoglobin Value: 16  Hematocrit Value: 46.5  Lab Platelet Value: (!) 137  Target INR: 2.5 to 3.5    INR from last 7 days     Date/Time INR Value    04/24/18 2100 (!)  3.28        Dose from last 7 days     Date/Time Dose (mg)    04/24/18 2300  4        Average Dose (mg):  (4m Sun, Tues, Thurs, Sat and 5 mg Mon, Wed, Fri)  Significant Interactions: Not Applicable  Bridge Therapy: No    Reversal Agent Administered: Not Applicable  Comments: INR therapeutic at higher goal range which was confirmed with [facility] (per cardiology recommendations).  Patient has not received dose today, will attempt to schedule dose for tonight and resume home regimen.  No signs of bleeding noted.  Will not schedule another INR at thist time as patient is therapeutic, consider INR in 2-3 days. Pharmacy will continue to monitor.    Plan:  Warfarin 4 mg     Pharmacist suggested discharge dosing: Resume home regimen of warfarin 4 mg Sun, Tues, Thurs, Sat and Warfarin 5 mg Mon, Wed, Fri.  Recommend INR check within 1 week of discharge.     Kristy Durand, PharmD., BCPS  PGY-2 Critical Care Resident  [phone]

## 2018-04-25 NOTE — PROGRESS NOTES
Received  bedside report from Izzy AKERS. Assumed pt care. Pt is awake, sitting up in bed, AOx4, paced rhythm on monitor, HR: 80s. Plan of care discussed and understood by patient, verbalized understanding.

## 2018-04-25 NOTE — PROGRESS NOTES
[employer] pacer tech at bedside. Pacer interrogated; Per interrogation pacer functioning normally.

## 2018-04-25 NOTE — PROGRESS NOTES
Pt dc'd home. IV and monitor removed. Pt left unit via wheelchair with tech; Transported home by bus, Provided bus pass. Personal belongings with pt when leaving unit. Pt given discharge instructions prior to leaving unit including prescription and when to visit with physician; verbalizes understanding. Copy of discharge instructions with pt and in the chart.

## 2018-04-25 NOTE — PROGRESS NOTES
Pt with complaints of generalized body pains 7/10, aching. Medicated per MAR. Comfort measures provided. Safety precautions in place. Call light within reach. Attended to needs.

## 2018-04-25 NOTE — H&P
FAMILY MEDICINE HISTORY AND PHYSICAL    Primary Care Physician: Ivonne Wild M.D.     Patient ID:  Name:             Morales Olivier   YOB: 1955  Age:                 62 y.o.  male   MRN:               [id number]    Attending Physician:  Dr. Lala   Senior Resident:  Dr. Santo  Gavin Resident:  Dr. Martell    CHIEF COMPLAINT  had concerns including Dizziness and Nausea.    HPI  Morales Olivier is a 62 y.o. Male w/ significant cardiac history of Afib/SSS s/p pacer placement on Coumadin and mitral valve replacement x 2, who presents with symptoms of dizziness, nausea, diaphoresis since this morning. He was seen in the [facility] clinic this am, for INR check (3.1 per patient), arrived by bus and was lightheaded. He says he normally does not drink a lot of water, maybe 2-3 glasses per day. He also says he can feel his irregular heart beat and this morning was able to tell a difference when his rhythm changed. He was sent by EMS to ED for work up. He denies chest pain, shortness of breath, vision changes, headache, or tinnitus.     Patient follows with Dr. Yancey of [facility] cardiology. On OAC with routine INR checks at [facility], goal 2.5-3.5. He had chemical stress test 2/2018 which was negative.    ER course: EKG w/ no sxs of ischemia, troponin x2 negative; CMP, Mg2+ and CBC wnl, UA with trace ketones, BNP, lipase normal; CXR stable; given 1L NS bolus and Zofran.    PAST MEDICAL HISTORY  Past Medical History:   Diagnosis Date   • Atrial fibrillation (CMS-HCC)    • Bronchitis    • CAD (coronary artery disease)    • Gout    • Heart valve disease     mitral valve replacement (bovine)   • Hypertension    • Pacemaker    • Personal history of venous thrombosis and embolism 2009    DVT right leg   • Renal disorder     kidney stones   • Type II or unspecified type diabetes mellitus without mention of complication, not stated as uncontrolled     DM type II- diet controlled       SURGICAL HISTORY  Past Surgical  History:   Procedure Laterality Date   • MITRAL VALVE REPLACE  3/8/2017    Procedure: MITRAL VALVE REPLACE-REDO;  Surgeon: Cornelia Wright M.D.;  Location: SURGERY [facility];  Service:    • KD  3/8/2017    Procedure: KD;  Surgeon: Cornelia Wright M.D.;  Location: SURGERY [facility];  Service:    • IRRIGATION & DEBRIDEMENT GENERAL  7/20/2009    Performed by MICHEL URBINA at SURGERY [facility]   • WIDE EXCISION  7/20/2009    Performed by MICHEL URBINA at SURGERY [facility]   • ANGIOGRAM  7/4/2009    Performed by MICHEL URBINA at SURGERY [facility]   • CATH REMOVAL  7/4/2009    Performed by MICHEL URBINA at SURGERY [facility]   • THROMBECTOMY  7/4/2009    Performed by MICHEL URBINA at SURGERY [facility]   • EMBOLECTOMY  7/4/2009    Performed by MICHEL URBINA at SURGERY [facility]   • ANGIOGRAM  7/3/2009    Performed by MORGAN WARD at SURGERY [facility]   • MITRAL VALVE REPLACE  3/14/08    Performed by CORNELIA WRIGHT at SURGERY [facility]   • MAZE PROCEDURE  3/14/08    Performed by CORNELIA WRIGHT at SURGERY [facility]   • OTHER CARDIAC SURGERY  2008    mitral valve replacement   • OTHER ABDOMINAL SURGERY      imbulica repair        CURRENT MEDICATIONS  Home Medications     Reviewed by Richie Sood (Pharmacy Tech) on 04/24/18 at 1639  Med List Status: Complete   Medication Last Dose Status   acetaminophen (TYLENOL) 500 MG Tab 4/23/2018 Active   indomethacin (INDOCIN) 50 MG Cap 4/24/2018 Active   metoprolol (LOPRESSOR) 25 MG TABS 4/24/2018 Active   tamsulosin (FLOMAX) 0.4 MG capsule 4/23/2018 Active   warfarin (COUMADIN) 4 MG Tab 4/22/2018 Active   warfarin (COUMADIN) 5 MG Tab 4/23/2018 Active                ALLERGIES  No Known Allergies    SOCIAL HISTORY  Social History     Social History Main Topics   • Smoking status: Former Smoker     Quit date: 1/1/1981   • Smokeless tobacco: Never Used   • Alcohol use No   • Drug  "use: No   • Sexual activity: Not on file   Former smoker, quit 1980s  Smokes marijuana  2 drinks per month, quit drinking heavily 4 years ago    FAMILY HISTORY  No family history on file.    PHYSICAL EXAM  VITAL SIGNS: /89   Pulse 92   Temp 35.9 °C (96.7 °F)   Resp (!) 21   Ht 1.778 m (5' 10\")   Wt 111 kg (244 lb 9.6 oz)   SpO2 97%   BMI 35.10 kg/m²    Oxygen Therapy:  Pulse Oximetry: 97 %  Vitals:    04/24/18 1330 04/24/18 1400 04/24/18 1500 04/24/18 1530   BP:       Pulse: 80 (!) 105 83 92   Resp: 15 15 (!) 23 (!) 21   Temp:       SpO2: 98% 99% 98% 97%   Weight:       Height:           Constitutional:  NAD, Non-toxic.   HEENT:  NC/AT, MMM, poor dentition  Eyes:  PERRL, EOMI, Conjunctiva normal, sclera anicteric  Neck:  Supple lymphadenopathy  Cardiovascular:  Regular rate, irregular rhythm, no murmurs  Lungs:  CTAB, no wheezes, rales, or rhonchi. Effort is normal.   Back: No CVA or spinal tenderness  Abdomen: +BS, soft, NDNT, no rebound or guarding, reducible umbilical hernia  Skin: Warm, Dry, No erythema, No evident rash.  Extremities: BIANCHI, chronic LE edema  Neurologic: AOx 4, Normal motor function, Normal sensory function, No focal deficits noted.  Psychiatric: Affect normal, Judgment normal.     EKG  12 Lead EKG obtained at ED and interpreted by me to show:  Rhythm: Atrial fibrillation w/ v-paced complexes  Rate: 87  Intervals:   QRS: 71   QTC: 457   All intervals are within normal limits    No ST changes  Clinical Impression: no signs of ischemia    Lab Data Review:  Lab Results   Component Value Date    WBC 4.8 04/24/2018    HEMOGLOBIN 16.0 04/24/2018    HEMATOCRIT 46.5 04/24/2018    PLATELETCT 137 (L) 04/24/2018    MCV 95.5 04/24/2018     Lab Results   Component Value Date    SODIUM 137 04/24/2018    POTASSIUM 4.0 04/24/2018    CHLORIDE 105 04/24/2018    CO2 24 04/24/2018    BUN 13 04/24/2018    CREATININE 1.38 04/24/2018    GLUCOSE 232 (H) 04/24/2018     Lab Results   Component Value Date    " PROTHROMBTM 32.9 (H) 03/13/2018    INR 3.25 (H) 03/13/2018      Lab Results   Component Value Date    CHOLSTRLTOT 134 03/13/2018    LDL 79 03/13/2018    HDL 22 (A) 03/13/2018    TRIGLYCERIDE 165 (H) 03/13/2018      Lab Results   Component Value Date    HBA1C 6.2 (H) 02/24/2018        Imaging/Procedures Review:    DX-CHEST-PORTABLE (1 VIEW)   Final Result      Stable prominence of the cardiomediastinal silhouette.           Assessment and Plan:     Morales Olivier is a 62 y.o. male with significant cardiac history who will be admitted for near syncope and cardiac etiology rule out.     1. Near Syncope  - likely related to atrial fibrillation and possible dehydration  - hx of chest pain work-up in 2/2018 with negative NM stress test  - no neurological deficits on exam, making CVA less likely; can consider neuro checks if status changes  - improved sxs after IV fluid bolus in ER  - EKG w/o ischemic changes, troponins negative x 2, electrolytes wnl, CXR stable  - admit to telemetry for observation  - NS + 20KCL at maintenance rate  - repeat troponin and EKG at 2030  - monitor I/O's  - continuous pulse ox  - Tylenol PRN pain    2. A-fib/SSS  - currently in a-fib, rate-controlled w/ Metoprolol 25mg BID for at least 2 years; anticoagulated w/ Coumadin INR goal 2.5-3.5  - most recent INR per pt 3.1 (today)  - Metoprolol 25mg BID  - Coumadin per pharmacy dosing    3. Hx of DM  - most recent HbA1c in 2/2018 6.2%  - no medications at home  - LDISS  - hypoglycemia protocol    4. Gout  - currently having some pain this morning, relieved now  - will hold indomethacin   - Tylenol PRN    5. HTN  - continue home metoprolol    6. BPH  - continue home tamsulosin    PPX: on coumadin baseline, bowel protocol  Diet: Cardiac   Abx: none  Lines: PIV R forearm  Dispo: Observaiton  Code: FULL

## 2018-04-25 NOTE — RESPIRATORY CARE
COPD EDUCATION by COPD CLINICAL EDUCATOR  4/25/2018 at 7:01 AM by Britt Del Real     Patient reviewed by COPD education team. Patient does not qualify for COPD program.

## 2018-04-26 ENCOUNTER — PATIENT OUTREACH (OUTPATIENT)
Dept: HEALTH INFORMATION MANAGEMENT | Facility: OTHER | Age: 63
End: 2018-04-26

## 2018-05-18 ENCOUNTER — OFFICE VISIT (OUTPATIENT)
Dept: CARDIOLOGY | Facility: MEDICAL CENTER | Age: 63
End: 2018-05-18
Payer: MEDICARE

## 2018-05-18 VITALS
HEIGHT: 70 IN | SYSTOLIC BLOOD PRESSURE: 124 MMHG | HEART RATE: 80 BPM | WEIGHT: 250 LBS | BODY MASS INDEX: 35.79 KG/M2 | OXYGEN SATURATION: 96 % | DIASTOLIC BLOOD PRESSURE: 70 MMHG

## 2018-05-18 DIAGNOSIS — Z79.01 CHRONIC ANTICOAGULATION: ICD-10-CM

## 2018-05-18 DIAGNOSIS — I10 ESSENTIAL HYPERTENSION, BENIGN: ICD-10-CM

## 2018-05-18 DIAGNOSIS — Z95.0 CARDIAC PACEMAKER IN SITU: ICD-10-CM

## 2018-05-18 DIAGNOSIS — I48.91 ATRIAL FIBRILLATION, UNSPECIFIED TYPE (HCC): ICD-10-CM

## 2018-05-18 DIAGNOSIS — Z95.2 H/O MITRAL VALVE REPLACEMENT: ICD-10-CM

## 2018-05-18 PROCEDURE — 99214 OFFICE O/P EST MOD 30 MIN: CPT | Performed by: INTERNAL MEDICINE

## 2018-05-18 ASSESSMENT — ENCOUNTER SYMPTOMS
ABDOMINAL PAIN: 0
FEVER: 0
CHILLS: 0
PND: 0
PALPITATIONS: 0
DIZZINESS: 0
INSOMNIA: 0
LOSS OF CONSCIOUSNESS: 0
SHORTNESS OF BREATH: 0
BLURRED VISION: 0
MYALGIAS: 0
ORTHOPNEA: 0

## 2018-05-18 NOTE — PROGRESS NOTES
Chief Complaint   Patient presents with   • Atrial Fibrillation     here to go over dosing of blood thiners       Subjective:   Morales Olivier is a 62 y.o. male who presents today for annual follow up of follow up with history of mitral valve replacement and study result review.     Since the patient's last visit on 06/07/17, His mild fatigue, dizziness, weakness and chest wall tenderness has essentially resolved. He denies chest pain, palpitations, nausea/vomiting or diaphoresis. He was admitted to [facility] in February and underwent unremarkable studies as described below.    Past Medical History:   Diagnosis Date   • Atrial fibrillation (HCC)    • Bronchitis    • CAD (coronary artery disease)    • Gout    • Heart valve disease     mitral valve replacement (bovine)   • Hypertension    • Pacemaker    • Personal history of venous thrombosis and embolism 2009    DVT right leg   • Renal disorder     kidney stones   • Type II or unspecified type diabetes mellitus without mention of complication, not stated as uncontrolled     DM type II- diet controlled     Past Surgical History:   Procedure Laterality Date   • MITRAL VALVE REPLACE  3/8/2017    Procedure: MITRAL VALVE REPLACE-REDO;  Surgeon: Cornelia Wright M.D.;  Location: SURGERY [facility];  Service:    • KD  3/8/2017    Procedure: KD;  Surgeon: Cornelia Wright M.D.;  Location: [facility];  Service:    • IRRIGATION & DEBRIDEMENT GENERAL  7/20/2009    Performed by MICHEL URBINA at [facility]   • WIDE EXCISION  7/20/2009    Performed by MICHEL URBINA at [facility]   • ANGIOGRAM  7/4/2009    Performed by MICHEL URBINA at [facility]   • CATH REMOVAL  7/4/2009    Performed by MICHEL URBINA at [facility]   • THROMBECTOMY  7/4/2009    Performed by MICHEL URBINA at [facility]   • EMBOLECTOMY  7/4/2009    Performed by MICHEL URBINA at [facility] ORS   • ANGIOGRAM  7/3/2009    Performed by MORGAN WARD at SURGERY [facility] ORS   • MITRAL VALVE REPLACE  3/14/08    Performed by JEANA MARTELL at SURGERY [facility] ORS   • MAZE PROCEDURE  3/14/08    Performed by JEANA MARTELL at SURGERY [facility] ORS   • OTHER CARDIAC SURGERY  2008    mitral valve replacement   • OTHER ABDOMINAL SURGERY      imbulica repair      Family History   Problem Relation Age of Onset   • Heart Disease Neg Hx      Social History     Social History   • Marital status: Single     Spouse name: N/A   • Number of children: N/A   • Years of education: N/A     Occupational History   • Not on file.     Social History Main Topics   • Smoking status: Former Smoker     Quit date: 1/1/1981   • Smokeless tobacco: Never Used   • Alcohol use No   • Drug use: No   • Sexual activity: Not on file     Other Topics Concern   • Not on file     Social History Narrative   • No narrative on file     No Known Allergies     Medications reviewed.    Outpatient Encounter Prescriptions as of 5/18/2018   Medication Sig Dispense Refill   • acetaminophen (TYLENOL) 500 MG Tab Take 500-1,000 mg by mouth every 6 hours as needed for Moderate Pain.     • indomethacin (INDOCIN) 50 MG Cap Take 50 mg by mouth 3 times a day as needed (For gout).     • warfarin (COUMADIN) 4 MG Tab Take 4 mg by mouth every day. Pt also has an RX for 5MG  Pt take 4MG on Tue, Thur, Sat, and Sun     • warfarin (COUMADIN) 5 MG Tab Take 5 mg by mouth every day. Pt also has an RX for 4MG  Pt takes 5MG on Mon, Wed, and Fri     • tamsulosin (FLOMAX) 0.4 MG capsule Take 0.4 mg by mouth every evening.     • metoprolol (LOPRESSOR) 25 MG TABS Take 1 Tab by mouth 2 Times a Day. 60 Tab 11     No facility-administered encounter medications on file as of 5/18/2018.      Review of Systems   Constitutional: Negative for chills and fever.   HENT: Negative for congestion.    Eyes: Negative for blurred vision.   Respiratory: Negative for  "shortness of breath.    Cardiovascular: Negative for chest pain, palpitations, orthopnea, leg swelling and PND.   Gastrointestinal: Negative for abdominal pain.   Genitourinary: Negative for dysuria.   Musculoskeletal: Negative for joint pain and myalgias.   Skin: Negative for rash.   Neurological: Negative for dizziness and loss of consciousness.   Psychiatric/Behavioral: The patient does not have insomnia.         Objective:   /70   Pulse 80   Ht 1.778 m (5' 10\")   Wt 113.4 kg (250 lb)   SpO2 96%   BMI 35.87 kg/m²     Physical Exam   Constitutional: He is oriented to person, place, and time. He appears well-developed and well-nourished.   HENT:   Head: Normocephalic and atraumatic.   Eyes: Conjunctivae are normal. Pupils are equal, round, and reactive to light.   Neck: Normal range of motion. Neck supple.   Cardiovascular: Normal rate and regular rhythm.    Pulmonary/Chest: Effort normal and breath sounds normal.   Abdominal: Soft. Bowel sounds are normal.   Musculoskeletal: Normal range of motion.   Neurological: He is alert and oriented to person, place, and time.   Skin: Skin is warm and dry.   Psychiatric: He has a normal mood and affect.     CARDIAC STUDIES/PROCEDURES:     CARDIAC CATHETERIZATION CONCLUSIONS by Dr. Kerr (01/08/17)  1.  Mitral valve prosthetic stenosis:  Severe 0.8 square cm.  2.  Pulmonary hypertension.  Moderate.  3.  EF 47%.  4.  Patent coronary arteries.     CARDIAC CATHETERIZATION CONCLUSIONS by Dr. Peterson (03/13/08)  3. Essentially normal coronary arterial tree and mild plaquing noted.  4. Unusually small distal half of the left anterior descending coronary artery.    CAROTID ULTRASOUND (03/07/17)  Normal carotids, subclavians and vertebrals     CAROTID ULTRASOUND (11/15/14)  Antegrade flow, bilateral vertebral arteries.   Flow within both subclavian arteries appears to be within normal limits.   Normal bilateral cervical carotid system.     ECHOCARDIOGRAM CONCLUSIONS " (03/18/17)  1. Left ventricular ejection fraction is visually estimated to be 60%.   2. There is apical inferior septal hypokinesis.   3. Known mitral valve bioprosthesis which is functioning normally with   appropriate transvalvular gradient. Mean transvalvular gradient is  5   mmHg at a heart rate of  80 BPM.  4. Right ventricular systolic pressure is estimated to be 25 mmHg.     ECHOCARDIOGRAM CONCLUSIONS (11/15/16)  Prior echocardiogram 5/15/2016. Compared to the report of the study   done - there has been no significant change.   Grossly normal left ventricular systolic function.  Left ventricular ejection fraction is visually estimated to be 50%.  Diastolic function is difficult to assess.   Pacer/ICD wire seen in right ventricle.  Known mitral valve bioprosthesis with probable moderate to severe stenosis.  Moderate tricuspid regurgitation.  Estimated right ventricular systolic pressure is 45 mmHg.     ECHOCARDIOGRAM CONCLUSIONS (05/05/16)  Prior study is available for comparison, 06/30/2015.   Known mitral valve bioprosthesis with probable stenosis based on ASE   criteria ( peak velocity 1.9-2.5 m/s, mean gradient 6-10).  Mean transvalvular gradient is 9 mmHg at a heart rate of  94 BPM.  Peak MV velocity 2.1 m/s Mild mitral regurgitation.  Mild concentric left ventricular hypertrophy.  Low normal left ventricular systolic function.  Left ventricular ejection fraction is visually estimated to be 50%.  Severely dilated left atrium.  Compared to the prior study of 6/3/2015, the prosthetic MV gradients   were actually slightly higher on the prior study.  The curent study is compatible with probable prosthetic valve stenosis   based on ASE criteria.     ECHOCARDIOGRAM CONCLUSIONS (06/30/15)  Normal left ventricular chamber size.   Mild concentric left ventricular hypertrophy.   Mildly reduced left ventricular systolic function.   Left ventricular ejection fraction is 50% to 55%.   Diastolic function is difficult  to assess.  Severely dilated right atrium.   Catheter/pacemaker wire present in the right atrial cavity.  Severely dilated left atrium.  Mitral Valve Replacement. Mean transvalvular gradient is 9-10 mmHg.   Mild to moderate mitral regurgitation. At least moderate mitral stenosis.   Compared to the report of July 2014: the prosthetic mitral valve   stenosis is more severe. Prior mean gradient was 8 mm Hg.     ECHOCARDIOGRAM CONCLUSIONS (07/25/14)  Low normal left ventricular systolic function.  Left ventricular ejection fraction is 50% to 55%.  Bovine bioprosthetic valve with a mean gradient of 8 mmHg.  Moderate mitral regurgitation.  Moderate bioprosthetic valve stenosis.     EKG performed on (04/03/17) was reviewed: EKG shows paced rhythm.  EKG performed on (12/14/16) EKG shows sinus tachycardia.  EKG performed on (11/22/16) EKG shows sinus rhythm.  EKG performed on (01/28/15) EKG shows atrial fibrillation.     Laboratory results of (03/13/18) were reviewed. Cholesterol profile of 134/165/22/79 noted.  Laboratory results of (12/06/16) Cholesterol profile of 167/187/24/105 noted.  Laboratory results of (11/14/14) Cholesterol profile of 134/138/23/83 noted.    MYOCARDIAL PERFUSION STUDY CONCLUSIONS (02/25/18)  No evidence of significant jeopardized viable myocardium or prior myocardial infarction.  Normal left ventricular size, ejection fraction, and wall motion.  ECG INTERPRETATION  Negative stress ECG for ischemia.     TRANSESOPHAGEAL ECHOCARDIOGRAM CONCLUSIONS by Dr. Ball (03/08/17)  1)  Severe MS of BP valve.  2)  Moderate to Severe TR  3)  23mm BPV:  Mean Gradient 3 mmhg.     TRANSESOPHAGEAL ECHOCARDIOGRAM CONCLUSIONS by Dr. Fitch (12/06/16)  Patient in atrial fibrillation during exam.  Grossly normal   biventricular systolic function. Known bioprosthetic mitral valve with   evidence of leaflet thickening and decreased mobility. Severe   bioprosthetic mitral stenosis. Mean gradient 10 mm Hg, valve area by    3-D planimetry is 0.9 cm2.  Moderate-severe tricuspid regurgitation.    Assessment:     1. H/O mitral valve replacement     2. Atrial fibrillation, unspecified type (HCC)     3. Cardiac pacemaker in situ     4. Chronic anticoagulation     5. Essential hypertension, benign       Medical Decision Making:  Today's Assessment / Status / Plan:     1. History of mitral valve replacement (29-mm Perimount bioprosthetic valve), resection of left atrial tumor (thrombus) by Dr. Wright on 03/10/08 and redo mitral valve replacement (29 mm Medtronic Mosaic porcine valve) again by Dr. Wright on 3/08/17: He is clinically improving. We will repeat an echocardiogram in one year.  2. Atrial fibrillation/sick sinus syndrome with pacemaker and chronic anticoagulation therapy (warfarin): Stable on warfarin.   3. Hypertension: Blood pressure is well controlled.  4. History of embolus to the right popliteal artery treated with thrombolytic: He is clinically doing well without recurrence of leg pain.    We will follow up the patient in one year with echocardiogram.

## 2018-05-22 ENCOUNTER — TELEPHONE (OUTPATIENT)
Dept: VASCULAR LAB | Facility: MEDICAL CENTER | Age: 63
End: 2018-05-22

## 2018-05-22 NOTE — TELEPHONE ENCOUNTER
Attempted to reach pt regarding anticoagulation referral. Pt answered, hung up. Then second call went to  which was not set up.     Courtney Pacheco, Pharm D

## 2018-06-12 LAB — EKG IMPRESSION: NORMAL

## 2018-08-07 ENCOUNTER — HOSPITAL ENCOUNTER (OUTPATIENT)
Dept: PULMONOLOGY | Facility: MEDICAL CENTER | Age: 63
End: 2018-08-07
Attending: FAMILY MEDICINE
Payer: MEDICARE

## 2018-08-07 PROCEDURE — 94726 PLETHYSMOGRAPHY LUNG VOLUMES: CPT

## 2018-08-07 PROCEDURE — 94060 EVALUATION OF WHEEZING: CPT

## 2018-08-07 PROCEDURE — 94060 EVALUATION OF WHEEZING: CPT | Mod: 26 | Performed by: INTERNAL MEDICINE

## 2018-08-07 PROCEDURE — 94729 DIFFUSING CAPACITY: CPT | Mod: 26 | Performed by: INTERNAL MEDICINE

## 2018-08-07 PROCEDURE — 94726 PLETHYSMOGRAPHY LUNG VOLUMES: CPT | Mod: 26 | Performed by: INTERNAL MEDICINE

## 2018-08-07 PROCEDURE — 94729 DIFFUSING CAPACITY: CPT

## 2018-08-07 ASSESSMENT — PULMONARY FUNCTION TESTS
FEV1/FVC: 53
FEV1_PERCENT_CHANGE: 10
FEV1/FVC_PERCENT_PREDICTED: 89
FEV1/FVC_PERCENT_LLN: 64
FEV1/FVC: 68.8
FEV1_PERCENT_PREDICTED: 72
FVC_PERCENT_PREDICTED: 4
FEV1/FVC_PERCENT_PREDICTED: 1725
FEV1_PERCENT_CHANGE: 5
FEV1/FVC: 369
FEV1/FVC_PERCENT_PREDICTED: 93
FEV1/FVC: 72
FEV1: 2.47
FVC_PERCENT_PREDICTED: 81
FEV1_LLN: 2.84
FVC: 3.59
FEV1/FVC_PERCENT_PREDICTED: 89
FEV1/FVC_PERCENT_PREDICTED: 5
FVC_PREDICTED: 73
FEV1_PREDICTED: 3.4
FEV1: 2.35
FEV1/FVC_PERCENT_CHANGE: -4
FEV1/FVC_PERCENT_LLN: 64
FEV1/FVC_PREDICTED: 77
FEV1_PERCENT_PREDICTED: 69
FEV1/FVC_PERCENT_CHANGE: 50
FVC_LLN: 3.70
FVC: 4.43
FVC_LLN: 3.70
FEV1_LLN: 2.84

## 2018-08-09 NOTE — PROCEDURES
PULMONARY FUNCTION TEST INTERPRETATION    DATE OF STUDY:  08/07/2018    AGE:  63.    GENDER:  Male.    RACE:  .    HEIGHT:  177 cm.    WEIGHT:  250 pounds.    INDICATIONS:  Exertional dyspnea.    INTERPRETATION:  The spirometry is suggestive of a mild obstructive defect   with partial bronchodilator response.  The post-bronchodilator, ratio of FEV1   to forced vital capacity is 69%.  The pre-bronchodilator FEV1 is 2.35 liters,   69% of predicted and post-bronchodilator FEV1 is 2.47 liters, 72% of predicted   with 5% post-bronchodilator change.  The pre-bronchodilator forced vital   capacity is 3.27 liters, 73% of predicted and post-bronchodilator, it is 3.59   liters, 81% of predicted with 10% change.  The lung volumes; however, are not   suggestive of a restrictive defect.  There is no hyperinflation and mild air   trapping.  The total lung capacity is normal 6.34 liters, 91% of predicted and   residual volume is minimally increased to 2.84 liters, 122% of predicted.    The diffusion capacity of the lung for carbon monoxide is increased to 31.63   mL per minute per mmHg, 129% of predicted.    IMPRESSION:  Mild obstructive defect with air trapping and increased diffusion   capacity is suggestive of asthma.  Obesity can also show increased diffusion.    Partial bronchodilator response on PFTs does not preclude use of   bronchodilators clinically.       ____________________________________     Muhammad K. Gondal, MD MKG / ANIBAL    DD:  08/09/2018 07:14:52  DT:  08/09/2018 07:26:52    D#:  [id number]  Job#:  [id number]

## 2018-12-04 ENCOUNTER — HOSPITAL ENCOUNTER (EMERGENCY)
Facility: MEDICAL CENTER | Age: 63
End: 2018-12-04
Attending: EMERGENCY MEDICINE
Payer: MEDICARE

## 2018-12-04 ENCOUNTER — APPOINTMENT (OUTPATIENT)
Dept: RADIOLOGY | Facility: MEDICAL CENTER | Age: 63
End: 2018-12-04
Attending: EMERGENCY MEDICINE
Payer: MEDICARE

## 2018-12-04 VITALS
DIASTOLIC BLOOD PRESSURE: 98 MMHG | SYSTOLIC BLOOD PRESSURE: 127 MMHG | BODY MASS INDEX: 28.41 KG/M2 | WEIGHT: 198.41 LBS | TEMPERATURE: 97.2 F | HEIGHT: 70 IN | RESPIRATION RATE: 16 BRPM | HEART RATE: 93 BPM | OXYGEN SATURATION: 97 %

## 2018-12-04 DIAGNOSIS — I48.91 ATRIAL FIBRILLATION, UNSPECIFIED TYPE (HCC): ICD-10-CM

## 2018-12-04 DIAGNOSIS — R00.2 PALPITATIONS: ICD-10-CM

## 2018-12-04 LAB
ALBUMIN SERPL BCP-MCNC: 4.1 G/DL (ref 3.2–4.9)
ALBUMIN/GLOB SERPL: 2.2 G/DL
ALP SERPL-CCNC: 62 U/L (ref 30–99)
ALT SERPL-CCNC: 11 U/L (ref 2–50)
ANION GAP SERPL CALC-SCNC: 6 MMOL/L (ref 0–11.9)
APTT PPP: 37.1 SEC (ref 24.7–36)
AST SERPL-CCNC: 16 U/L (ref 12–45)
BASOPHILS # BLD AUTO: 0.5 % (ref 0–1.8)
BASOPHILS # BLD: 0.04 K/UL (ref 0–0.12)
BILIRUB SERPL-MCNC: 0.8 MG/DL (ref 0.1–1.5)
BNP SERPL-MCNC: 46 PG/ML (ref 0–100)
BUN SERPL-MCNC: 16 MG/DL (ref 8–22)
CALCIUM SERPL-MCNC: 8.8 MG/DL (ref 8.5–10.5)
CHLORIDE SERPL-SCNC: 108 MMOL/L (ref 96–112)
CO2 SERPL-SCNC: 26 MMOL/L (ref 20–33)
CREAT SERPL-MCNC: 1.19 MG/DL (ref 0.5–1.4)
EKG IMPRESSION: NORMAL
EOSINOPHIL # BLD AUTO: 0.19 K/UL (ref 0–0.51)
EOSINOPHIL NFR BLD: 2.5 % (ref 0–6.9)
ERYTHROCYTE [DISTWIDTH] IN BLOOD BY AUTOMATED COUNT: 51.2 FL (ref 35.9–50)
GLOBULIN SER CALC-MCNC: 1.9 G/DL (ref 1.9–3.5)
GLUCOSE SERPL-MCNC: 140 MG/DL (ref 65–99)
HCT VFR BLD AUTO: 46.4 % (ref 42–52)
HGB BLD-MCNC: 15.7 G/DL (ref 14–18)
IMM GRANULOCYTES # BLD AUTO: 0.04 K/UL (ref 0–0.11)
IMM GRANULOCYTES NFR BLD AUTO: 0.5 % (ref 0–0.9)
INR PPP: 3.09 (ref 0.87–1.13)
LIPASE SERPL-CCNC: 19 U/L (ref 11–82)
LYMPHOCYTES # BLD AUTO: 0.98 K/UL (ref 1–4.8)
LYMPHOCYTES NFR BLD: 13.1 % (ref 22–41)
MCH RBC QN AUTO: 33.1 PG (ref 27–33)
MCHC RBC AUTO-ENTMCNC: 33.8 G/DL (ref 33.7–35.3)
MCV RBC AUTO: 97.9 FL (ref 81.4–97.8)
MONOCYTES # BLD AUTO: 0.53 K/UL (ref 0–0.85)
MONOCYTES NFR BLD AUTO: 7.1 % (ref 0–13.4)
NEUTROPHILS # BLD AUTO: 5.69 K/UL (ref 1.82–7.42)
NEUTROPHILS NFR BLD: 76.3 % (ref 44–72)
NRBC # BLD AUTO: 0 K/UL
NRBC BLD-RTO: 0 /100 WBC
PLATELET # BLD AUTO: 161 K/UL (ref 164–446)
PMV BLD AUTO: 10.6 FL (ref 9–12.9)
POTASSIUM SERPL-SCNC: 4.1 MMOL/L (ref 3.6–5.5)
PROT SERPL-MCNC: 6 G/DL (ref 6–8.2)
PROTHROMBIN TIME: 31.9 SEC (ref 12–14.6)
RBC # BLD AUTO: 4.74 M/UL (ref 4.7–6.1)
SODIUM SERPL-SCNC: 140 MMOL/L (ref 135–145)
TROPONIN I SERPL-MCNC: 0.02 NG/ML (ref 0–0.04)
WBC # BLD AUTO: 7.5 K/UL (ref 4.8–10.8)

## 2018-12-04 PROCEDURE — 85610 PROTHROMBIN TIME: CPT

## 2018-12-04 PROCEDURE — 84484 ASSAY OF TROPONIN QUANT: CPT

## 2018-12-04 PROCEDURE — 83690 ASSAY OF LIPASE: CPT

## 2018-12-04 PROCEDURE — 700101 HCHG RX REV CODE 250: Performed by: EMERGENCY MEDICINE

## 2018-12-04 PROCEDURE — 93005 ELECTROCARDIOGRAM TRACING: CPT | Performed by: EMERGENCY MEDICINE

## 2018-12-04 PROCEDURE — 71045 X-RAY EXAM CHEST 1 VIEW: CPT

## 2018-12-04 PROCEDURE — 83880 ASSAY OF NATRIURETIC PEPTIDE: CPT

## 2018-12-04 PROCEDURE — 85025 COMPLETE CBC W/AUTO DIFF WBC: CPT

## 2018-12-04 PROCEDURE — 96374 THER/PROPH/DIAG INJ IV PUSH: CPT

## 2018-12-04 PROCEDURE — 85730 THROMBOPLASTIN TIME PARTIAL: CPT

## 2018-12-04 PROCEDURE — 36415 COLL VENOUS BLD VENIPUNCTURE: CPT

## 2018-12-04 PROCEDURE — 99284 EMERGENCY DEPT VISIT MOD MDM: CPT

## 2018-12-04 PROCEDURE — 80053 COMPREHEN METABOLIC PANEL: CPT

## 2018-12-04 RX ORDER — METOPROLOL TARTRATE 1 MG/ML
5 INJECTION, SOLUTION INTRAVENOUS ONCE
Status: COMPLETED | OUTPATIENT
Start: 2018-12-04 | End: 2018-12-04

## 2018-12-04 RX ADMIN — METOPROLOL TARTRATE 5 MG: 5 INJECTION INTRAVENOUS at 13:15

## 2018-12-04 NOTE — ED PROVIDER NOTES
ED Provider Note    Scribed for Rosalba Flores M.D. by Billy Croft. 12/4/2018  12:33 PM    Primary care provider: Ivonne Wild M.D.  Means of arrival: EMS  [facility]  History limited by: none    CHIEF COMPLAINT  Chief Complaint   Patient presents with   • Palpitations       HPI  Morales Olivier is a 63 y.o. male with a cardiac pacemaker who presents to the Emergency Department as a referral from [facility] complaining of sudden palpitations starting today. Patient reports associated mild lightheadedness, cough. He reports a history of intermittent atrial fibrillation for which he takes coumadin. Patient states that he woke this morning with palpitations, prompting him to go to his primary for evaluation. He reports a history of mitral valve replacement. He also reports a history of diabetes for which is not medicated and does not check his blood glucose level. Patient denies chest pain, leg swelling, fever, shortness of breath, chills, history of MI, CVA.     REVIEW OF SYSTEMS  HEENT:  No ear pain, congestion, or sore throat   EYES: no discharge, redness, or vision changes  CARDIAC: Palpitations. no chest pain.  PULMONARY: Cough, no dyspnea, or congestion   GI: no vomiting, diarrhea, or abdominal pain   : no dysuria, back pain, or hematuria   Neuro: Lightheadedness. no weakness, numbness, aphasia, or headache  Musculoskeletal: no swelling, deformity, pain, or joint swelling  Endocrine: no fevers, sweating, weight loss ,chills  SKIN: no rash, erythema, or contusions     See history of present illness. All other systems are negative. C.    PAST MEDICAL HISTORY   has a past medical history of Atrial fibrillation (HCC); Bronchitis; CAD (coronary artery disease); Gout; Heart valve disease; Hypertension; Pacemaker; Personal history of venous thrombosis and embolism (2009); Renal disorder; and Type II or unspecified type diabetes mellitus without mention of complication, not  "stated as uncontrolled.    SURGICAL HISTORY   has a past surgical history that includes mitral valve replace (3/14/08); maze procedure (3/14/08); angiogram (7/3/2009); angiogram (7/4/2009); cath removal (7/4/2009); thrombectomy (7/4/2009); embolectomy (7/4/2009); irrigation & debridement general (7/20/2009); wide excision (7/20/2009); other cardiac surgery (2008); other abdominal surgery; mitral valve replace (3/8/2017); and lopez (3/8/2017).    SOCIAL HISTORY  Social History   Substance Use Topics   • Smoking status: Former Smoker     Quit date: 1/1/1981   • Smokeless tobacco: Never Used   • Alcohol use No      History   Drug Use No       FAMILY HISTORY  Family History   Problem Relation Age of Onset   • Heart Disease Neg Hx        CURRENT MEDICATIONS  Home Medications     Reviewed by Deepa Lafleur R.N. (Registered Nurse) on 12/04/18 at 1147  Med List Status: Not Addressed   Medication Last Dose Status   acetaminophen (TYLENOL) 500 MG Tab 5/18/2018 Active   indomethacin (INDOCIN) 50 MG Cap 5/18/2018 Active   metoprolol (LOPRESSOR) 25 MG TABS 5/18/2018 Active   tamsulosin (FLOMAX) 0.4 MG capsule 5/18/2018 Active   warfarin (COUMADIN) 4 MG Tab 5/18/2018 Active   warfarin (COUMADIN) 5 MG Tab 5/18/2018 Active                ALLERGIES  No Known Allergies    PHYSICAL EXAM  VITAL SIGNS: /98   Pulse (!) 114   Temp 36.2 °C (97.2 °F) (Temporal)   Resp 16   Ht 1.778 m (5' 10\")   Wt 90 kg (198 lb 6.6 oz)   SpO2 97%   BMI 28.47 kg/m²     Constitutional: Well developed, Well nourished, No acute distress, Non-toxic appearance.   HEENT: Normocephalic, Atraumatic,  external ears normal, pharynx pink,  Mucous  Membranes moist, No rhinorrhea or mucosal edema  Eyes: PERRL, EOMI, Conjunctiva normal, No discharge.   Neck: Normal range of motion, No tenderness, Supple, No stridor.   Lymphatic: No lymphadenopathy    Cardiovascular: Tachycardic. Irregularly irregular rhythm. No murmurs,  rubs, or gallops.   Thorax & Lungs: " Lungs clear to auscultation bilaterally, No respiratory distress, No wheezes, rhales or rhonchi, No chest wall tenderness.   Abdomen: Bowel sounds normal, Soft, non tender, non distended,  No pulsatile masses., no rebound guarding or peritoneal signs.   Skin: Warm, Dry, No erythema, No rash,   Back:  No CVA tenderness,  No spinal tenderness, bony crepitance, step offs, or instability.   Neurologic: Alert & oriented x 3, Normal motor function, Normal sensory function, No focal deficits noted. Normal reflexes. Normal Cranial Nerves.  Extremities: Equal, intact distal pulses, No cyanosis, clubbing.  No tenderness. Trace lower extremity edema. No venous cording.   Musculoskeletal: Good range of motion in all major joints. No tenderness to palpation or major deformities noted.     DIAGNOSTIC STUDIES / PROCEDURES    LABS  Results for orders placed or performed during the hospital encounter of 12/04/18   Troponin   Result Value Ref Range    Troponin I 0.02 0.00 - 0.04 ng/mL   Btype Natriuretic Peptide   Result Value Ref Range    B Natriuretic Peptide 46 0 - 100 pg/mL   CBC with Differential   Result Value Ref Range    WBC 7.5 4.8 - 10.8 K/uL    RBC 4.74 4.70 - 6.10 M/uL    Hemoglobin 15.7 14.0 - 18.0 g/dL    Hematocrit 46.4 42.0 - 52.0 %    MCV 97.9 (H) 81.4 - 97.8 fL    MCH 33.1 (H) 27.0 - 33.0 pg    MCHC 33.8 33.7 - 35.3 g/dL    RDW 51.2 (H) 35.9 - 50.0 fL    Platelet Count 161 (L) 164 - 446 K/uL    MPV 10.6 9.0 - 12.9 fL    Neutrophils-Polys 76.30 (H) 44.00 - 72.00 %    Lymphocytes 13.10 (L) 22.00 - 41.00 %    Monocytes 7.10 0.00 - 13.40 %    Eosinophils 2.50 0.00 - 6.90 %    Basophils 0.50 0.00 - 1.80 %    Immature Granulocytes 0.50 0.00 - 0.90 %    Nucleated RBC 0.00 /100 WBC    Neutrophils (Absolute) 5.69 1.82 - 7.42 K/uL    Lymphs (Absolute) 0.98 (L) 1.00 - 4.80 K/uL    Monos (Absolute) 0.53 0.00 - 0.85 K/uL    Eos (Absolute) 0.19 0.00 - 0.51 K/uL    Baso (Absolute) 0.04 0.00 - 0.12 K/uL    Immature Granulocytes  (abs) 0.04 0.00 - 0.11 K/uL    NRBC (Absolute) 0.00 K/uL   Complete Metabolic Panel (CMP)   Result Value Ref Range    Sodium 140 135 - 145 mmol/L    Potassium 4.1 3.6 - 5.5 mmol/L    Chloride 108 96 - 112 mmol/L    Co2 26 20 - 33 mmol/L    Anion Gap 6.0 0.0 - 11.9    Glucose 140 (H) 65 - 99 mg/dL    Bun 16 8 - 22 mg/dL    Creatinine 1.19 0.50 - 1.40 mg/dL    Calcium 8.8 8.5 - 10.5 mg/dL    AST(SGOT) 16 12 - 45 U/L    ALT(SGPT) 11 2 - 50 U/L    Alkaline Phosphatase 62 30 - 99 U/L    Total Bilirubin 0.8 0.1 - 1.5 mg/dL    Albumin 4.1 3.2 - 4.9 g/dL    Total Protein 6.0 6.0 - 8.2 g/dL    Globulin 1.9 1.9 - 3.5 g/dL    A-G Ratio 2.2 g/dL   Prothrombin Time   Result Value Ref Range    PT 31.9 (H) 12.0 - 14.6 sec    INR 3.09 (H) 0.87 - 1.13   APTT   Result Value Ref Range    APTT 37.1 (H) 24.7 - 36.0 sec   Lipase   Result Value Ref Range    Lipase 19 11 - 82 U/L   ESTIMATED GFR   Result Value Ref Range    GFR If African American >60 >60 mL/min/1.73 m 2    GFR If Non African American >60 >60 mL/min/1.73 m 2   EKG   Result Value Ref Range    Report       [facility] Emergency Dept.    Test Date:  2018  Pt Name:    ALIYN MCINTOSH                 Department: ER  MRN:        [id number]                      Room:        13  Gender:     Male                         Technician: [id number]  :        1955                   Requested By:ER TRIAGE PROTOCOL  Order #:    [id number]                    Reading MD: BJ ADDISON MD    Measurements  Intervals                                Axis  Rate:       118                          P:          0  AL:         172                          QRS:        94  QRSD:       88                           T:          22  QT:         340  QTc:        477    Interpretive Statements  VENTRICULAR-PACED COMPLEXES  NO FURTHER RHYTHM ANALYSIS ATTEMPTED DUE TO PACED RHYTHM  RIGHT AXIS DEVIATION  BORDERLINE T ABNORMALITIES, ANTERIOR LEADS  BORDERLINE PROLONGED QT INTERVAL  Compared  "to ECG 04/24/2018 20:27:18  Right-axis deviation now present  T-wave abnormality now present    Electronically Sig jennie On 12-4-2018 13:02:17 PST by BJ ADDISON MD     All labs reviewed by me.    EKG  Interpreted by me as above.     RADIOLOGY  DX-CHEST-LIMITED (1 VIEW)   Final Result      Stable prominence of the cardiomediastinal silhouette.      The radiologist's interpretation of all radiological studies have been reviewed by me.    COURSE & MEDICAL DECISION MAKING  Nursing notes, VS, PMSFHx reviewed in chart.    12:33 PM Patient seen and examined at bedside. Patient will be treated with Lopressor 5 mg. Ordered DX chest, Troponin, BNP, CBC with differential, CMP, PT/INR, APTT, Lipase to evaluate his symptoms. The differential diagnoses include but are not limited to: arrhythmia, CHF    Review of past medical records shows the patient had a stress test performed in February 2018. No ischemia. EF 60%.     12:47 PM - Paged [facility].     12:58 PM - I discussed the patient's case and the above findings with [facility] who agrees to consult.     1:08 PM - [facility] family agrees that the patient is a good outpatient treatment candidate. They recommend that he remain on his current medication regimen and follow up with his primary. I will reassess the patient after interventions are complete.      1:23 PM - Recheck: Patient re-evaluated at beside. Patient reports feeling improved. Heart rate is improved with interventions. Patient's diagnostic results discussed. Discussed patient's condition and treatment plan. Patient will be discharged with instructions and provided with strict return precautions. Advised to follow up with [facility]. Instructed to return to Emergency Department immediately if any new or worsening symptoms.    Discharge vitals: /98   Pulse 93   Temp 36.2 °C (97.2 °F) (Temporal)   Resp 16   Ht 1.778 m (5' 10\")   Wt 90 kg (198 lb 6.6 oz)   SpO2 97%   BMI 28.47 kg/m²     The patient " will return for new or worsening symptoms and is stable at the time of discharge.    The patient is referred to a primary physician for blood pressure management, diabetic screening, and for all other preventative health concerns.    DISPOSITION:  Patient will be discharged home in stable condition.    FOLLOW UP:  Ivonne Wild M.D.  [address]   [address]  [facility] [address]  [phone]    Call in 1 day  for recheck    FINAL IMPRESSION  1. Palpitations    2. Atrial fibrillation, unspecified type (HCC)          Billy JARRETT (Scribe), am scribing for, and in the presence of, Rosalba Flores M.D..    Electronically signed by: Billy Croft (Scribe), 12/4/2018    Rosalba JARRETT M.D. personally performed the services described in this documentation, as scribed by Billy Croft in my presence, and it is both accurate and complete.    The note accurately reflects work and decisions made by me.  Rosalba Flores  12/4/2018  3:40 PM

## 2018-12-04 NOTE — ED NOTES
Pt discharged at this time, verbalized understanding of discharge instructions and follow up at this time.

## 2018-12-04 NOTE — ED TRIAGE NOTES
PT BIB EMS from [facility] for palpitations. Pt with cardiac pacemaker, sinus tachycardia. Denies chest pain, pt does have cough, nonproductive at this time.

## 2018-12-05 ENCOUNTER — PATIENT OUTREACH (OUTPATIENT)
Dept: HEALTH INFORMATION MANAGEMENT | Facility: OTHER | Age: 63
End: 2018-12-05

## 2019-01-14 ENCOUNTER — HOSPITAL ENCOUNTER (OUTPATIENT)
Dept: LAB | Facility: MEDICAL CENTER | Age: 64
End: 2019-01-14
Attending: PODIATRIST
Payer: MEDICARE

## 2019-01-14 LAB
ANION GAP SERPL CALC-SCNC: 8 MMOL/L (ref 0–11.9)
BASOPHILS # BLD AUTO: 0.9 % (ref 0–1.8)
BASOPHILS # BLD: 0.07 K/UL (ref 0–0.12)
BUN SERPL-MCNC: 14 MG/DL (ref 8–22)
CALCIUM SERPL-MCNC: 9.7 MG/DL (ref 8.5–10.5)
CHLORIDE SERPL-SCNC: 105 MMOL/L (ref 96–112)
CO2 SERPL-SCNC: 29 MMOL/L (ref 20–33)
CREAT SERPL-MCNC: 1.4 MG/DL (ref 0.5–1.4)
EOSINOPHIL # BLD AUTO: 0.3 K/UL (ref 0–0.51)
EOSINOPHIL NFR BLD: 3.8 % (ref 0–6.9)
ERYTHROCYTE [DISTWIDTH] IN BLOOD BY AUTOMATED COUNT: 53 FL (ref 35.9–50)
GLUCOSE SERPL-MCNC: 74 MG/DL (ref 65–99)
HCT VFR BLD AUTO: 49.3 % (ref 42–52)
HGB BLD-MCNC: 16.1 G/DL (ref 14–18)
IMM GRANULOCYTES # BLD AUTO: 0.02 K/UL (ref 0–0.11)
IMM GRANULOCYTES NFR BLD AUTO: 0.3 % (ref 0–0.9)
LYMPHOCYTES # BLD AUTO: 1.38 K/UL (ref 1–4.8)
LYMPHOCYTES NFR BLD: 17.4 % (ref 22–41)
MCH RBC QN AUTO: 32.3 PG (ref 27–33)
MCHC RBC AUTO-ENTMCNC: 32.7 G/DL (ref 33.7–35.3)
MCV RBC AUTO: 99 FL (ref 81.4–97.8)
MONOCYTES # BLD AUTO: 0.59 K/UL (ref 0–0.85)
MONOCYTES NFR BLD AUTO: 7.4 % (ref 0–13.4)
NEUTROPHILS # BLD AUTO: 5.56 K/UL (ref 1.82–7.42)
NEUTROPHILS NFR BLD: 70.2 % (ref 44–72)
NRBC # BLD AUTO: 0 K/UL
NRBC BLD-RTO: 0 /100 WBC
PLATELET # BLD AUTO: 183 K/UL (ref 164–446)
PMV BLD AUTO: 10.9 FL (ref 9–12.9)
POTASSIUM SERPL-SCNC: 4.3 MMOL/L (ref 3.6–5.5)
RBC # BLD AUTO: 4.98 M/UL (ref 4.7–6.1)
SODIUM SERPL-SCNC: 142 MMOL/L (ref 135–145)
URATE SERPL-MCNC: 9.2 MG/DL (ref 2.5–8.3)
WBC # BLD AUTO: 7.9 K/UL (ref 4.8–10.8)

## 2019-01-14 PROCEDURE — 36415 COLL VENOUS BLD VENIPUNCTURE: CPT

## 2019-01-14 PROCEDURE — 80048 BASIC METABOLIC PNL TOTAL CA: CPT

## 2019-01-14 PROCEDURE — 84550 ASSAY OF BLOOD/URIC ACID: CPT

## 2019-01-14 PROCEDURE — 85025 COMPLETE CBC W/AUTO DIFF WBC: CPT

## 2019-04-13 ENCOUNTER — APPOINTMENT (OUTPATIENT)
Dept: RADIOLOGY | Facility: MEDICAL CENTER | Age: 64
End: 2019-04-13
Attending: EMERGENCY MEDICINE
Payer: MEDICARE

## 2019-04-13 ENCOUNTER — HOSPITAL ENCOUNTER (OUTPATIENT)
Facility: MEDICAL CENTER | Age: 64
End: 2019-04-15
Attending: EMERGENCY MEDICINE | Admitting: FAMILY MEDICINE
Payer: MEDICARE

## 2019-04-13 DIAGNOSIS — R00.2 PALPITATIONS: ICD-10-CM

## 2019-04-13 DIAGNOSIS — R07.9 CHEST PAIN, UNSPECIFIED TYPE: ICD-10-CM

## 2019-04-13 PROBLEM — I49.3 PVC (PREMATURE VENTRICULAR CONTRACTION): Status: ACTIVE | Noted: 2019-04-13

## 2019-04-13 PROBLEM — M79.661 RIGHT CALF PAIN: Status: ACTIVE | Noted: 2019-04-13

## 2019-04-13 LAB
ALBUMIN SERPL BCP-MCNC: 3.9 G/DL (ref 3.2–4.9)
ALBUMIN/GLOB SERPL: 2.4 G/DL
ALP SERPL-CCNC: 56 U/L (ref 30–99)
ALT SERPL-CCNC: 8 U/L (ref 2–50)
ANION GAP SERPL CALC-SCNC: 15 MMOL/L (ref 0–11.9)
AST SERPL-CCNC: 15 U/L (ref 12–45)
BASOPHILS # BLD AUTO: 0.6 % (ref 0–1.8)
BASOPHILS # BLD: 0.04 K/UL (ref 0–0.12)
BILIRUB SERPL-MCNC: 0.9 MG/DL (ref 0.1–1.5)
BNP SERPL-MCNC: 37 PG/ML (ref 0–100)
BUN SERPL-MCNC: 14 MG/DL (ref 8–22)
CALCIUM SERPL-MCNC: 8.3 MG/DL (ref 8.5–10.5)
CHLORIDE SERPL-SCNC: 108 MMOL/L (ref 96–112)
CO2 SERPL-SCNC: 19 MMOL/L (ref 20–33)
CREAT SERPL-MCNC: 1.27 MG/DL (ref 0.5–1.4)
EKG IMPRESSION: NORMAL
EOSINOPHIL # BLD AUTO: 0.24 K/UL (ref 0–0.51)
EOSINOPHIL NFR BLD: 3.8 % (ref 0–6.9)
ERYTHROCYTE [DISTWIDTH] IN BLOOD BY AUTOMATED COUNT: 54.4 FL (ref 35.9–50)
GLOBULIN SER CALC-MCNC: 1.6 G/DL (ref 1.9–3.5)
GLUCOSE BLD-MCNC: 107 MG/DL (ref 65–99)
GLUCOSE SERPL-MCNC: 149 MG/DL (ref 65–99)
HCT VFR BLD AUTO: 44 % (ref 42–52)
HGB BLD-MCNC: 14.1 G/DL (ref 14–18)
IMM GRANULOCYTES # BLD AUTO: 0.04 K/UL (ref 0–0.11)
IMM GRANULOCYTES NFR BLD AUTO: 0.6 % (ref 0–0.9)
INR PPP: 3.62 (ref 0.87–1.13)
LYMPHOCYTES # BLD AUTO: 0.86 K/UL (ref 1–4.8)
LYMPHOCYTES NFR BLD: 13.5 % (ref 22–41)
MCH RBC QN AUTO: 32.6 PG (ref 27–33)
MCHC RBC AUTO-ENTMCNC: 32 G/DL (ref 33.7–35.3)
MCV RBC AUTO: 101.9 FL (ref 81.4–97.8)
MONOCYTES # BLD AUTO: 0.45 K/UL (ref 0–0.85)
MONOCYTES NFR BLD AUTO: 7.1 % (ref 0–13.4)
NEUTROPHILS # BLD AUTO: 4.74 K/UL (ref 1.82–7.42)
NEUTROPHILS NFR BLD: 74.4 % (ref 44–72)
NRBC # BLD AUTO: 0 K/UL
NRBC BLD-RTO: 0 /100 WBC
PLATELET # BLD AUTO: 159 K/UL (ref 164–446)
PMV BLD AUTO: 10.5 FL (ref 9–12.9)
POTASSIUM SERPL-SCNC: 4.4 MMOL/L (ref 3.6–5.5)
PROT SERPL-MCNC: 5.5 G/DL (ref 6–8.2)
PROTHROMBIN TIME: 36 SEC (ref 12–14.6)
RBC # BLD AUTO: 4.32 M/UL (ref 4.7–6.1)
SODIUM SERPL-SCNC: 142 MMOL/L (ref 135–145)
TROPONIN I SERPL-MCNC: <0.01 NG/ML (ref 0–0.04)
TROPONIN I SERPL-MCNC: <0.01 NG/ML (ref 0–0.04)
WBC # BLD AUTO: 6.4 K/UL (ref 4.8–10.8)

## 2019-04-13 PROCEDURE — A9270 NON-COVERED ITEM OR SERVICE: HCPCS | Performed by: FAMILY MEDICINE

## 2019-04-13 PROCEDURE — 85610 PROTHROMBIN TIME: CPT

## 2019-04-13 PROCEDURE — 85025 COMPLETE CBC W/AUTO DIFF WBC: CPT

## 2019-04-13 PROCEDURE — 99285 EMERGENCY DEPT VISIT HI MDM: CPT

## 2019-04-13 PROCEDURE — 96374 THER/PROPH/DIAG INJ IV PUSH: CPT

## 2019-04-13 PROCEDURE — A9270 NON-COVERED ITEM OR SERVICE: HCPCS | Performed by: EMERGENCY MEDICINE

## 2019-04-13 PROCEDURE — 96376 TX/PRO/DX INJ SAME DRUG ADON: CPT

## 2019-04-13 PROCEDURE — 700102 HCHG RX REV CODE 250 W/ 637 OVERRIDE(OP): Performed by: FAMILY MEDICINE

## 2019-04-13 PROCEDURE — G0378 HOSPITAL OBSERVATION PER HR: HCPCS

## 2019-04-13 PROCEDURE — 80053 COMPREHEN METABOLIC PANEL: CPT

## 2019-04-13 PROCEDURE — 700111 HCHG RX REV CODE 636 W/ 250 OVERRIDE (IP): Performed by: STUDENT IN AN ORGANIZED HEALTH CARE EDUCATION/TRAINING PROGRAM

## 2019-04-13 PROCEDURE — 700102 HCHG RX REV CODE 250 W/ 637 OVERRIDE(OP): Performed by: STUDENT IN AN ORGANIZED HEALTH CARE EDUCATION/TRAINING PROGRAM

## 2019-04-13 PROCEDURE — 700102 HCHG RX REV CODE 250 W/ 637 OVERRIDE(OP): Performed by: EMERGENCY MEDICINE

## 2019-04-13 PROCEDURE — 84484 ASSAY OF TROPONIN QUANT: CPT

## 2019-04-13 PROCEDURE — 71045 X-RAY EXAM CHEST 1 VIEW: CPT

## 2019-04-13 PROCEDURE — 82962 GLUCOSE BLOOD TEST: CPT

## 2019-04-13 PROCEDURE — 94760 N-INVAS EAR/PLS OXIMETRY 1: CPT

## 2019-04-13 PROCEDURE — 83880 ASSAY OF NATRIURETIC PEPTIDE: CPT

## 2019-04-13 PROCEDURE — 93005 ELECTROCARDIOGRAM TRACING: CPT | Performed by: EMERGENCY MEDICINE

## 2019-04-13 RX ORDER — DEXTROSE MONOHYDRATE 25 G/50ML
25 INJECTION, SOLUTION INTRAVENOUS
Status: DISCONTINUED | OUTPATIENT
Start: 2019-04-13 | End: 2019-04-15 | Stop reason: HOSPADM

## 2019-04-13 RX ORDER — ACETAMINOPHEN 325 MG/1
650 TABLET ORAL EVERY 6 HOURS PRN
Status: DISCONTINUED | OUTPATIENT
Start: 2019-04-13 | End: 2019-04-15 | Stop reason: HOSPADM

## 2019-04-13 RX ORDER — POLYETHYLENE GLYCOL 3350 17 G/17G
1 POWDER, FOR SOLUTION ORAL
Status: DISCONTINUED | OUTPATIENT
Start: 2019-04-13 | End: 2019-04-15 | Stop reason: HOSPADM

## 2019-04-13 RX ORDER — AMOXICILLIN 250 MG
2 CAPSULE ORAL 2 TIMES DAILY PRN
Status: DISCONTINUED | OUTPATIENT
Start: 2019-04-13 | End: 2019-04-15 | Stop reason: HOSPADM

## 2019-04-13 RX ORDER — BISACODYL 10 MG
10 SUPPOSITORY, RECTAL RECTAL
Status: DISCONTINUED | OUTPATIENT
Start: 2019-04-13 | End: 2019-04-15 | Stop reason: HOSPADM

## 2019-04-13 RX ORDER — ASPIRIN 325 MG
325 TABLET ORAL ONCE
Status: DISPENSED | OUTPATIENT
Start: 2019-04-13 | End: 2019-04-14

## 2019-04-13 RX ORDER — ACETAMINOPHEN 500 MG
1000 TABLET ORAL ONCE
Status: COMPLETED | OUTPATIENT
Start: 2019-04-13 | End: 2019-04-13

## 2019-04-13 RX ORDER — WARFARIN SODIUM 5 MG/1
5 TABLET ORAL
Status: DISCONTINUED | OUTPATIENT
Start: 2019-04-15 | End: 2019-04-14

## 2019-04-13 RX ORDER — MORPHINE SULFATE 4 MG/ML
2 INJECTION, SOLUTION INTRAMUSCULAR; INTRAVENOUS
Status: DISCONTINUED | OUTPATIENT
Start: 2019-04-13 | End: 2019-04-15 | Stop reason: HOSPADM

## 2019-04-13 RX ORDER — TAMSULOSIN HYDROCHLORIDE 0.4 MG/1
0.4 CAPSULE ORAL EVERY EVENING
Status: DISCONTINUED | OUTPATIENT
Start: 2019-04-13 | End: 2019-04-15 | Stop reason: HOSPADM

## 2019-04-13 RX ORDER — WARFARIN SODIUM 4 MG/1
4 TABLET ORAL
Status: DISCONTINUED | OUTPATIENT
Start: 2019-04-13 | End: 2019-04-14

## 2019-04-13 RX ADMIN — MORPHINE SULFATE 2 MG: 4 INJECTION INTRAVENOUS at 18:51

## 2019-04-13 RX ADMIN — TAMSULOSIN HYDROCHLORIDE 0.4 MG: 0.4 CAPSULE ORAL at 18:33

## 2019-04-13 RX ADMIN — WARFARIN SODIUM 4 MG: 4 TABLET ORAL at 20:54

## 2019-04-13 RX ADMIN — METOPROLOL TARTRATE 25 MG: 25 TABLET, FILM COATED ORAL at 18:32

## 2019-04-13 RX ADMIN — ACETAMINOPHEN 650 MG: 325 TABLET, FILM COATED ORAL at 18:31

## 2019-04-13 RX ADMIN — METOPROLOL TARTRATE 25 MG: 25 TABLET ORAL at 18:40

## 2019-04-13 RX ADMIN — ACETAMINOPHEN 1000 MG: 500 TABLET, FILM COATED ORAL at 12:57

## 2019-04-13 RX ADMIN — MORPHINE SULFATE 2 MG: 4 INJECTION INTRAVENOUS at 23:17

## 2019-04-13 ASSESSMENT — PATIENT HEALTH QUESTIONNAIRE - PHQ9
8. MOVING OR SPEAKING SO SLOWLY THAT OTHER PEOPLE COULD HAVE NOTICED. OR THE OPPOSITE, BEING SO FIGETY OR RESTLESS THAT YOU HAVE BEEN MOVING AROUND A LOT MORE THAN USUAL: NOT AT ALL
3. TROUBLE FALLING OR STAYING ASLEEP OR SLEEPING TOO MUCH: SEVERAL DAYS
2. FEELING DOWN, DEPRESSED, IRRITABLE, OR HOPELESS: SEVERAL DAYS
1. LITTLE INTEREST OR PLEASURE IN DOING THINGS: NOT AT ALL
7. TROUBLE CONCENTRATING ON THINGS, SUCH AS READING THE NEWSPAPER OR WATCHING TELEVISION: NOT AT ALL
SUM OF ALL RESPONSES TO PHQ QUESTIONS 1-9: 3
9. THOUGHTS THAT YOU WOULD BE BETTER OFF DEAD, OR OF HURTING YOURSELF: NOT AT ALL
4. FEELING TIRED OR HAVING LITTLE ENERGY: SEVERAL DAYS
5. POOR APPETITE OR OVEREATING: NOT AT ALL
6. FEELING BAD ABOUT YOURSELF - OR THAT YOU ARE A FAILURE OR HAVE LET YOURSELF OR YOUR FAMILY DOWN: NOT AL ALL
SUM OF ALL RESPONSES TO PHQ9 QUESTIONS 1 AND 2: 1

## 2019-04-13 ASSESSMENT — LIFESTYLE VARIABLES
EVER_SMOKED: YES
ALCOHOL_USE: NO
EVER_SMOKED: YES

## 2019-04-13 ASSESSMENT — COPD QUESTIONNAIRES
IN THE PAST 12 MONTHS DO YOU DO LESS THAN YOU USED TO BECAUSE OF YOUR BREATHING PROBLEMS: DISAGREE/UNSURE
COPD SCREENING SCORE: 6
DO YOU EVER COUGH UP ANY MUCUS OR PHLEGM?: YES, A FEW DAYS A WEEK OR MONTH
DURING THE PAST 4 WEEKS HOW MUCH DID YOU FEEL SHORT OF BREATH: SOME OF THE TIME
HAVE YOU SMOKED AT LEAST 100 CIGARETTES IN YOUR ENTIRE LIFE: YES

## 2019-04-13 NOTE — H&P
"[facility] MEDICINE HISTORY AND PHYSICAL     PATIENT ID:  NAME:  Morales Olivier  MRN:               [id number]  YOB: 1955    Date of Admission: 4/13/2019     Attending:   Ana De La O MD    Resident:   Haydee Perez MD    Primary Care Physician:    Ivonne Wild MD    CC:    Chest pain, palpitations    HPI: Morales Olivier is a 63 y.o. male with a history of A. 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  He also describes shortness of breath with exertion this [place]  He then sat down and felt nauseous.  He describes the pain as \"feeling like gas\" and reports that it improved with belching.  His nausea and dyspnea also improved after sitting down.  However, due to the chest pain and his palpitations, he called an ambulance to be evaluated at the hospital.  Patient is also complaining of right leg pain.  He has a history of gout, and ran out of indomethacin a few days ago.  Of note he has a history of a DVT in the right leg in 2008 which was at the same time as an atrial tumor diagnosis.  Patient denies headaches, vomiting, abdominal pain, numbness, weakness, tingling.    In the ED, patient was normotensive with intermittent tachycardia.  Was noted to have right up to the 140s while moving around.  He had one negative troponin.  His EKG was negative for ischemic changes.  Chest x-ray had no acute findings.  BNP was within normal limits.  CBC was unremarkable. CMP significant for low bicarb and mildly elevated anion gap    REVIEW OF SYSTEMS:   Ten systems reviewed and were negative except as noted in the HPI.                PAST MEDICAL HISTORY:  Past Medical History:   Diagnosis Date   • Atrial fibrillation (HCC)    • Bronchitis    • CAD (coronary artery " disease)    • Gout    • Heart valve disease     mitral valve replacement (bovine)   • Hypertension    • Pacemaker    • Personal history of venous thrombosis and embolism 2009    DVT right leg   • Renal disorder     kidney stones   • Type II or unspecified type diabetes mellitus without mention of complication, not stated as uncontrolled     DM type II- diet controlled       PAST SURGICAL HISTORY:  Past Surgical History:   Procedure Laterality Date   • MITRAL VALVE REPLACE  3/8/2017    Procedure: MITRAL VALVE REPLACE-REDO;  Surgeon: Cornelia Wright M.D.;  Location: SURGERY [facility];  Service:    • KD  3/8/2017    Procedure: KD;  Surgeon: Cornelia Wright M.D.;  Location: SURGERY [facility];  Service:    • IRRIGATION & DEBRIDEMENT GENERAL  7/20/2009    Performed by MICHEL URBINA at [facility]   • WIDE EXCISION  7/20/2009    Performed by MICHEL URBINA at [facility]   • ANGIOGRAM  7/4/2009    Performed by MICHEL URBINA at SURGERY [facility]   • CATH REMOVAL  7/4/2009    Performed by MICHEL URBINA at [facility]   • THROMBECTOMY  7/4/2009    Performed by MICHEL URBINA at SURGERY [facility]   • EMBOLECTOMY  7/4/2009    Performed by MICHEL URBINA at [facility]   • ANGIOGRAM  7/3/2009    Performed by MORGAN WARD at SURGERY [facility]   • MITRAL VALVE REPLACE  3/14/08    Performed by CORNELIA WRIGHT at SURGERY [facility]   • MAZE PROCEDURE  3/14/08    Performed by CORNELIA WRIGHT at SURGERY [facility]   • OTHER CARDIAC SURGERY  2008    mitral valve replacement   • OTHER ABDOMINAL SURGERY      imbulica repair        FAMILY HISTORY:  Family History   Problem Relation Age of Onset   • Heart Disease Neg Hx        SOCIAL HISTORY:   Smoking Quit in the 80s, 24 pack year history  Etoh use: Drinks coffee with daily is on occasion  Drug use: Denies    DIET:   Orders Placed This Encounter   Procedures   • Diet  Order Cardiac     Standing Status:   Standing     Number of Occurrences:   1     Order Specific Question:   Diet:     Answer:   Cardiac [6]       ALLERGIES:  No Known Allergies    OUTPATIENT MEDICATIONS:    Current Facility-Administered Medications:   •  acetaminophen (TYLENOL) tablet 650 mg, 650 mg, Oral, Q6HRS PRN, Ana De La O M.D.  •  metoprolol (LOPRESSOR) tablet 25 mg, 25 mg, Oral, BID, Ana De La O M.D.  •  tamsulosin (FLOMAX) capsule 0.4 mg, 0.4 mg, Oral, Q EVENING, Ana De La O M.D.  •  warfarin (COUMADIN) tablet 4 mg, 4 mg, Oral, Once per day on Sun Tue Thu Sat, Ana De La O M.D.  •  [START ON 4/15/2019] warfarin (COUMADIN) tablet 5 mg, 5 mg, Oral, Once per day on Mon Wed Fri, Ana De La O M.D.  •  metoprolol (LOPRESSOR) tablet 25 mg, 25 mg, Oral, Once, Ana De La O M.D.  •  senna-docusate (PERICOLACE or SENOKOT S) 8.6-50 MG per tablet 2 Tab, 2 Tab, Oral, BID PRN **AND** polyethylene glycol/lytes (MIRALAX) PACKET 1 Packet, 1 Packet, Oral, QDAY PRN **AND** magnesium hydroxide (MILK OF MAGNESIA) suspension 30 mL, 30 mL, Oral, QDAY PRN **AND** bisacodyl (DULCOLAX) suppository 10 mg, 10 mg, Rectal, QDAY PRN, Ana De La O M.D.    Current Outpatient Prescriptions:   •  metoprolol (LOPRESSOR) 25 MG Tab, Take 25 mg by mouth 2 times a day., Disp: , Rfl:   •  acetaminophen (TYLENOL) 500 MG Tab, Take 500-1,000 mg by mouth every 6 hours as needed for Moderate Pain., Disp: , Rfl:   •  indomethacin (INDOCIN) 50 MG Cap, Take 50 mg by mouth 3 times a day as needed (For gout)., Disp: , Rfl:   •  warfarin (COUMADIN) 4 MG Tab, Take 4 mg by mouth every day. Pt also has an RX for 5MG Pt take 4MG on Tue, Thur, Sat, and Sun, Disp: , Rfl:   •  warfarin (COUMADIN) 5 MG Tab, Take 5 mg by mouth every day. Pt also has an RX for 4MG Pt takes 5MG on Mon, Wed, and Fri, Disp: , Rfl:   •  tamsulosin (FLOMAX) 0.4 MG capsule, Take 0.4 mg by mouth every evening., Disp: , Rfl:      PHYSICAL EXAM:  Vitals:    19 1430 19 1500 19 1530 19 1600   BP:       Pulse: 90 (!) 106 (!) 106 85   Resp: 20 18 16 20   Temp:       TempSrc:       SpO2: 98% 96% 98% 97%   Weight:       Height:       , Temp (24hrs), Av.5 °C (97.7 °F), Min:36.5 °C (97.7 °F), Max:36.5 °C (97.7 °F)  , Pulse Oximetry: 97 %, O2 Delivery: None (Room Air)    General: Pt resting in NAD, cooperative   Skin:  Pink, warm and dry.  No rashes  HEENT: NC/AT. PERRL. EOMI. MMM. No nasal discharge. Oropharynx nonerythematous without exudate/plaques.  Poor dentition  Neck:  Supple without lymphadenopathy or rigidity. No JVD   Lungs:  Symmetrical.  CTAB with no W/R/R.  Good air movement   Cardiovascular:  S1/S2 irregularly irregular rhythm, tachycardic without M/R/G.  Abdomen:  Abdomen is soft NT/ND. +BS.  An easily reducible ventral hernia is present.  Extremities:  Full range of motion.  There is a tophaceous deformity of the right first tarsometatarsal joint, and one on the left second toe. 2+ pulses in all extremities. No C/C/E   CNS:  Muscle tone is normal.  Strength is grossly intact and symmetric.  Sensation grossly intact      LAB TESTS:   Recent Labs      19   1219   WBC  6.4   RBC  4.32*   HEMOGLOBIN  14.1   HEMATOCRIT  44.0   MCV  101.9*   MCH  32.6   RDW  54.4*   PLATELETCT  159*   MPV  10.5   NEUTSPOLYS  74.40*   LYMPHOCYTES  13.50*   MONOCYTES  7.10   EOSINOPHILS  3.80   BASOPHILS  0.60     Recent Labs      19   1219   TROPONINI  <0.01   BNPBTYPENAT  37     Recent Labs      19   1219   SODIUM  142   POTASSIUM  4.4   CHLORIDE  108   CO2  19*   BUN  14   CREATININE  1.27   CALCIUM  8.3*   ALBUMIN  3.9       CULTURES:   Results     ** No results found for the last 168 hours. **          IMAGES:  DX-CHEST-PORTABLE (1 VIEW)   Final Result         1.  Apparent cardiac enlargement again noted.      2.  No new infiltrates or consolidations identified.      NM-CARDIAC STRESS TEST    (Results  Pending)         CONSULTS:   None    ASSESSMENT/PLAN: 63 y.o. male admitted for chest pain, palpitations and evaluation for ACS.  He has moderate cardiac risk for obesity, diabetes, and hypertension.  Patient also has a history of DVT in the right leg, however he is currently compliant with Coumadin therapy, which decreases his risk of thrombosis and therefore emboli.  He has no signs of illness to indicate pneumonia.  Due to his elevated heart rates were witnessed during exam, 1 possibility is poor rate control of his A. fib leading to demand ischemia.    #Chest pain  Initial troponin negative, EKG without ischemic changes.  Last stress test was 14 months ago and was negative.  Patient has a HEART score of 4 which is 12-15% risk, SUSY score of 1  Plan:  -Admit for observation and serial troponins  -Aspirin 325 mg x1  -Repeat EKGs with any episodes of chest pain  -We will order chemical stress test    #Atrial fibrillation on chronic anticoagulation  Patient reports compliance with his Coumadin regime.  He takes 5 mg Monday Wednesday Friday, and 4 mg Tuesday Thursday Saturday and Sunday.  Coags were not measured in the ED.  On exam, his rate fluctuated from 80s to low 100s while resting to 140s while moving around.  He reports compliance with his home dose of metoprolol 25 twice daily  Plan:  -Monitor on telemetry  -We will continue home metoprolol plus a one-time additional dose of 25 mg  -Coags ordered and pending  -If coag are subtherapeutic, will consider ultrasound of the right lower extremity    #Sick sinus syndrome status post pacemaker  Patient reports last interrogation was at least a year ago but everything was normal at that time  EKG consistent with occasional paced beats per.  Monitor on telemetry    #DM type II  -Not on home medications  -ISS    #Gout  Leg pain is very likely due to gout since he has not been taking his indomethacin  -Tylenol PRN pain  -We will order uric acid level since he has not  been on allopurinol    #Lab abnormalities  Patient has bicarb of 19 and anion gap of 15.  Will repeat BMP in a.m.    Core measures:  DVT PPX: On Coumadin  ABX: None  Lines: PIV  Fluids: None  PCP: Ivonne Wild MD  CODE STATUS: Full code

## 2019-04-13 NOTE — ED NOTES
Pt readjusted in bed at this time.  Patient updated on plan of care.  Will continue to monitor.

## 2019-04-13 NOTE — ED NOTES
Pt remains resting in bed at this time.  Pending results at this time.  Will continue to monitor.

## 2019-04-13 NOTE — ED TRIAGE NOTES
Chief Complaint   Patient presents with   • Palpitations     Pt states new onset palpitations today.  Pt verbalizes new onset right leg and foot pain.  No injury, trauma.  + pulses, cap refill less than 3 seconds.  No obvious deformity, abrasions, discolorations.  Pt has hx afib.   , 250NS given by EMS.  Pacemaker present.

## 2019-04-13 NOTE — ED PROVIDER NOTES
ED Provider Note    Scribed for Dick Chandra M.D. by Mony Justice. 4/13/2019  12:44 PM    Primary care provider: Ivonne Wild M.D.  Means of arrival: Ambulance  History obtained from: Patient  History limited by: None    CHIEF COMPLAINT  Chief Complaint   Patient presents with   • Palpitations       HPI  Morales Olivier is a 63 y.o. male who presents to the Emergency Department for heart palpitations about 8:00 AM this morning. He reports a fast and skipping heart beat that he thinks might be a flare up of his atrial fibrillation. At this time, he reports right leg pain onset this morning. Patient reports that he has chronic gout with is much worse today which he usually takes Indocin for. He does not report any alleviating and exacerbating factors. He states that he takes his blood thinning medication. At this time, he states that he has some mild chest discomfort. He denies fever.     REVIEW OF SYSTEMS  Pertinent positives include heart palpitations, chest discomfort, right foot pain. Pertinent negatives include no fever.  All other systems reviewed and negative.    PAST MEDICAL HISTORY   has a past medical history of Atrial fibrillation (HCC); Bronchitis; CAD (coronary artery disease); Gout; Heart valve disease; Hypertension; Pacemaker; Personal history of venous thrombosis and embolism (2009); Renal disorder; and Type II or unspecified type diabetes mellitus without mention of complication, not stated as uncontrolled.    SURGICAL HISTORY   has a past surgical history that includes mitral valve replace (3/14/08); maze procedure (3/14/08); angiogram (7/3/2009); angiogram (7/4/2009); cath removal (7/4/2009); thrombectomy (7/4/2009); embolectomy (7/4/2009); irrigation & debridement general (7/20/2009); wide excision (7/20/2009); other cardiac surgery (2008); other abdominal surgery; mitral valve replace (3/8/2017); and lopez (3/8/2017).    SOCIAL HISTORY  Social History   Substance Use Topics   •  "Smoking status: Former Smoker     Quit date: 1/1/1981   • Smokeless tobacco: Never Used   • Alcohol use No      History   Drug Use No       FAMILY HISTORY  Family History   Problem Relation Age of Onset   • Heart Disease Neg Hx        CURRENT MEDICATIONS  Home Medications     Reviewed by Richie Mariano (Pharmacy [facility]) on 04/13/19 at 1521  Med List Status: Complete   Medication Last Dose Status   acetaminophen (TYLENOL) 500 MG Tab 4/12/2019 Active   indomethacin (INDOCIN) 50 MG Cap >2days Active   metoprolol (LOPRESSOR) 25 MG Tab 4/13/2019 Active   tamsulosin (FLOMAX) 0.4 MG capsule 4/12/2019 Active   warfarin (COUMADIN) 4 MG Tab 4/11/2019 Active   warfarin (COUMADIN) 5 MG Tab 4/12/2019 Active                ALLERGIES  No Known Allergies    PHYSICAL EXAM  VITAL SIGNS: /81   Pulse 98   Temp 36.5 °C (97.7 °F) (Temporal)   Resp 16   Ht 1.778 m (5' 10\")   Wt 112 kg (247 lb)   BMI 35.44 kg/m²     Constitutional: Well developed, Well nourished, No acute distress, Non-toxic appearance.   HENT: Normocephalic, Atraumatic, Bilateral external ears normal, Oropharynx moist, No oral exudates.   Eyes: PERRLA, EOMI, Conjunctiva normal, No discharge.   Neck: No tenderness, Supple, No stridor.   Lymphatic: No lymphadenopathy noted.   Cardiovascular: Irregular heart rate.   Thorax & Lungs: Clear to auscultation bilaterally, No respiratory distress, No wheezing, No crackles.   Abdomen: Soft, No tenderness, No masses, No pulsatile masses.   Skin: Warm, Dry, No erythema, No rash. Large central sternal scar.   Extremities:, No edema No cyanosis.   Musculoskeletal: No tenderness to palpation or major deformities noted.  Intact distal pulses  Neurologic: Awake, alert. Moves all extremities spontaneously.  Psychiatric: Affect normal, Judgment normal, Mood normal.    LABS  Labs Reviewed   CBC WITH DIFFERENTIAL - Abnormal; Notable for the following:        Result Value    RBC 4.32 (*)     .9 (*)     MCHC 32.0 (*)     " RDW 54.4 (*)     Platelet Count 159 (*)     Neutrophils-Polys 74.40 (*)     Lymphocytes 13.50 (*)     Lymphs (Absolute) 0.86 (*)     All other components within normal limits   COMP METABOLIC PANEL   TROPONIN   BTYPE NATRIURETIC PEPTIDE     All labs reviewed by me.    EKG  Results for orders placed or performed during the hospital encounter of 19   EKG   Result Value Ref Range    Report       [facility] Emergency Dept.    Test Date:  2019  Pt Name:    AILYN MCINTOSH                 Department: ER  MRN:        [id number]                      Room:       [facility]  Gender:     Male                         Technician: [id number]  :        1955                   Requested By:CARMELA BARON  Order #:    [id number]                    Reading MD: CARMELA BARON MD    Measurements  Intervals                                Axis  Rate:       96                           P:  CT:                                      QRS:        71  QRSD:       90                           T:          265  QT:         388  QTc:        491    Interpretive Statements  ATRIAL FIBRILLATION  BORDERLINE REPOLARIZATION ABNORMALITY  BORDERLINE PROLONGED QT INTERVAL  Compared to ECG 2018 12:00:37  Ventricular-paced complex(es) or rhythm no longer present  Right-axis deviation no longer present  T-wave abnormality no longer present    Electronically Signed On 2019 14:45:11  PDT by CARMELA BARON MD       RADIOLOGY  DX-CHEST-PORTABLE (1 VIEW)   Final Result         1.  Apparent cardiac enlargement again noted.      2.  No new infiltrates or consolidations identified.        The radiologist's interpretation of all radiological studies have been reviewed by me.      COURSE & MEDICAL DECISION MAKING  Pertinent Labs & Imaging studies reviewed. (See chart for details)    I reviewed the patient's medical records which showed that the patient was last seen in this facility in December for palpitations. He is  currently on blood thinning medication for intermittent atrial fibration. Patient has a pacemaker.     12:44 PM - Patient seen and examined at bedside. Patient will be treated with Tylenol 1,000 mg. Ordered DX chest, CBC, CMP, troponin, Btype Natriuretic peptide, EKG to evaluate his symptoms.    2:36 PM - Internal Medicine paged.    2:37 PM - Spoke with Dr. Govea, Family medicine, about the patient's condition. Dr. Govea agrees to accept the patient for admission.     2:43 PM Patient was reevaluated at bedside. Discussed lab and radiology results with the patient, as shown above and informed them about the plan for admission. The patient understands and agrees with treatment plan.      Decision Making:  Patient with palpitations, chest pains, extensive cardiac history, EKG is nonspecific, discussed the case with [facility] family practice for admission the hospital.    DISPOSITION:  Patient will be admitted to Dr. Govea in gaurded condition.      FINAL IMPRESSION  1. Palpitations    2. Chest pain, unspecified type          I, Mony Justice (Scribe), am scribing for, and in the presence of, Dick Chandra M.D..    Electronically signed by: Mony Justice (Scribe), 4/13/2019    I, Dick hCandra M.D. personally performed the services described in this documentation, as scribed by Mony Justice in my presence, and it is both accurate and complete. C    The note accurately reflects work and decisions made by me.  Dick Chandra  4/13/2019  3:30 PM

## 2019-04-13 NOTE — ED NOTES
Med Rec Updated and Complete per Pt at bedside  Allergies Reviewed  No PO ABX last 30 days.    Pt is on a Warfarin Regimen as follows;    Warfarin 5mg tablets  Warfarin 4mg tablets    1. Take 5mg on Mon, Wed, and Fri.  2. Take 4mg on Sun, Tues, Thurs, and Sat.    Last Dose 5mg 04/12/19 PM.    Pt reports he ran out of Indomethacin a couple of days ago.    Pt knows medication HX well.

## 2019-04-14 ENCOUNTER — APPOINTMENT (OUTPATIENT)
Dept: RADIOLOGY | Facility: MEDICAL CENTER | Age: 64
End: 2019-04-14
Attending: STUDENT IN AN ORGANIZED HEALTH CARE EDUCATION/TRAINING PROGRAM
Payer: MEDICARE

## 2019-04-14 PROBLEM — R07.9 CHEST PAIN: Status: RESOLVED | Noted: 2018-02-24 | Resolved: 2019-04-14

## 2019-04-14 LAB
ANION GAP SERPL CALC-SCNC: 6 MMOL/L (ref 0–11.9)
BUN SERPL-MCNC: 16 MG/DL (ref 8–22)
CALCIUM SERPL-MCNC: 8.5 MG/DL (ref 8.5–10.5)
CHLORIDE SERPL-SCNC: 109 MMOL/L (ref 96–112)
CO2 SERPL-SCNC: 25 MMOL/L (ref 20–33)
CREAT SERPL-MCNC: 1.29 MG/DL (ref 0.5–1.4)
GLUCOSE BLD-MCNC: 119 MG/DL (ref 65–99)
GLUCOSE BLD-MCNC: 130 MG/DL (ref 65–99)
GLUCOSE BLD-MCNC: 149 MG/DL (ref 65–99)
GLUCOSE BLD-MCNC: 88 MG/DL (ref 65–99)
GLUCOSE SERPL-MCNC: 117 MG/DL (ref 65–99)
INR PPP: 3.92 (ref 0.87–1.13)
MAGNESIUM SERPL-MCNC: 1.9 MG/DL (ref 1.5–2.5)
POTASSIUM SERPL-SCNC: 4.1 MMOL/L (ref 3.6–5.5)
PROTHROMBIN TIME: 38.3 SEC (ref 12–14.6)
SODIUM SERPL-SCNC: 140 MMOL/L (ref 135–145)
TROPONIN I SERPL-MCNC: 0.01 NG/ML (ref 0–0.04)
URATE SERPL-MCNC: 10.1 MG/DL (ref 2.5–8.3)

## 2019-04-14 PROCEDURE — 96376 TX/PRO/DX INJ SAME DRUG ADON: CPT | Mod: XU

## 2019-04-14 PROCEDURE — 36415 COLL VENOUS BLD VENIPUNCTURE: CPT

## 2019-04-14 PROCEDURE — 700111 HCHG RX REV CODE 636 W/ 250 OVERRIDE (IP)

## 2019-04-14 PROCEDURE — A9270 NON-COVERED ITEM OR SERVICE: HCPCS | Performed by: FAMILY MEDICINE

## 2019-04-14 PROCEDURE — 85610 PROTHROMBIN TIME: CPT

## 2019-04-14 PROCEDURE — A9502 TC99M TETROFOSMIN: HCPCS

## 2019-04-14 PROCEDURE — 80048 BASIC METABOLIC PNL TOTAL CA: CPT

## 2019-04-14 PROCEDURE — 700102 HCHG RX REV CODE 250 W/ 637 OVERRIDE(OP): Performed by: STUDENT IN AN ORGANIZED HEALTH CARE EDUCATION/TRAINING PROGRAM

## 2019-04-14 PROCEDURE — 82962 GLUCOSE BLOOD TEST: CPT | Mod: 91

## 2019-04-14 PROCEDURE — 84550 ASSAY OF BLOOD/URIC ACID: CPT

## 2019-04-14 PROCEDURE — 700102 HCHG RX REV CODE 250 W/ 637 OVERRIDE(OP): Performed by: FAMILY MEDICINE

## 2019-04-14 PROCEDURE — 83735 ASSAY OF MAGNESIUM: CPT

## 2019-04-14 PROCEDURE — 84484 ASSAY OF TROPONIN QUANT: CPT

## 2019-04-14 PROCEDURE — 700111 HCHG RX REV CODE 636 W/ 250 OVERRIDE (IP): Performed by: STUDENT IN AN ORGANIZED HEALTH CARE EDUCATION/TRAINING PROGRAM

## 2019-04-14 PROCEDURE — G0378 HOSPITAL OBSERVATION PER HR: HCPCS

## 2019-04-14 PROCEDURE — A9270 NON-COVERED ITEM OR SERVICE: HCPCS | Performed by: STUDENT IN AN ORGANIZED HEALTH CARE EDUCATION/TRAINING PROGRAM

## 2019-04-14 PROCEDURE — 93970 EXTREMITY STUDY: CPT

## 2019-04-14 RX ORDER — REGADENOSON 0.08 MG/ML
INJECTION, SOLUTION INTRAVENOUS
Status: COMPLETED
Start: 2019-04-14 | End: 2019-04-14

## 2019-04-14 RX ORDER — ALLOPURINOL 100 MG/1
100 TABLET ORAL DAILY
Qty: 30 TAB | Refills: 0 | Status: SHIPPED | OUTPATIENT
Start: 2019-04-14 | End: 2019-04-16

## 2019-04-14 RX ORDER — ATORVASTATIN CALCIUM 40 MG/1
40 TABLET, FILM COATED ORAL DAILY
Qty: 30 TAB | Refills: 0 | Status: SHIPPED | OUTPATIENT
Start: 2019-04-14 | End: 2019-04-16

## 2019-04-14 RX ADMIN — WARFARIN SODIUM 4 MG: 4 TABLET ORAL at 15:52

## 2019-04-14 RX ADMIN — REGADENOSON 0.4 MG: 0.08 INJECTION, SOLUTION INTRAVENOUS at 09:22

## 2019-04-14 RX ADMIN — METOPROLOL TARTRATE 25 MG: 25 TABLET ORAL at 18:32

## 2019-04-14 RX ADMIN — METOPROLOL TARTRATE 25 MG: 25 TABLET, FILM COATED ORAL at 06:46

## 2019-04-14 RX ADMIN — METOPROLOL TARTRATE 25 MG: 25 TABLET, FILM COATED ORAL at 17:56

## 2019-04-14 RX ADMIN — MORPHINE SULFATE 2 MG: 4 INJECTION INTRAVENOUS at 11:52

## 2019-04-14 RX ADMIN — TAMSULOSIN HYDROCHLORIDE 0.4 MG: 0.4 CAPSULE ORAL at 17:56

## 2019-04-14 RX ADMIN — ACETAMINOPHEN 650 MG: 325 TABLET, FILM COATED ORAL at 15:51

## 2019-04-14 ASSESSMENT — CHA2DS2 SCORE
CHA2DS2 VASC SCORE: 4
AGE 75 OR GREATER: NO
HYPERTENSION: YES
CHF OR LEFT VENTRICULAR DYSFUNCTION: NO
SEX: MALE
AGE 65 TO 74: YES
PRIOR STROKE OR TIA OR THROMBOEMBOLISM: NO
VASCULAR DISEASE: YES
DIABETES: YES

## 2019-04-14 NOTE — ASSESSMENT & PLAN NOTE
Currently chest pain-free, without acute changes noted on EKG  Admit for cardiac monitoring and possible stress test in the morning  Another differential considered is pulmonary embolism given the patient's chest pain and clinical signs of lower extremity DVT.  Given that the patient has other explanations for his tachycardia, chest pain and that the patient is currently supratherapeutic on warfarin this differential is felt to be less likely  We will get lower extremity ultrasound to evaluate for DVT and will start anticoagulation with heparin and consider treatment failure if this is present.  Consider pacer interrogation   HEART Score 4 (Risk of major cardiac event 12-16.6%)  WELLs for PE of 6, moderate risk (16.2% probability of PE in study population)

## 2019-04-14 NOTE — PROGRESS NOTES
"[facility] [place]     Attending:   Ana De La O MD    Resident:   Haydee Perez MD    PATIENT: Morales Olivier; [id number]; 1955    ID: 63 y.o. male admitted for chest pain, palpitations,    SUBJECTIVE: No acute events overnight. Still having some right calf and foot tenderness. Chest symptoms improved.    OBJECTIVE:     Vitals:    04/13/19 1716 04/13/19 2035 04/14/19 0032 04/14/19 0512   BP: 137/94 141/93 126/93 138/89   Pulse:  82 80 93   Resp: 18 20 20 18   Temp: 36.9 °C (98.4 °F) 36.7 °C (98 °F) 37 °C (98.6 °F) 36.3 °C (97.4 °F)   TempSrc: Temporal Temporal Temporal Temporal   SpO2: 96% 96% 94% 98%   Weight: 115.8 kg (255 lb 4.7 oz)      Height: 1.778 m (5' 10\")          Intake/Output Summary (Last 24 hours) at 04/14/19 0702  Last data filed at 04/13/19 2100   Gross per 24 hour   Intake              240 ml   Output              125 ml   Net              115 ml       PE:   General: Pt resting in NAD, cooperative   Skin:  Pink, warm and dry.  No rashes  HEENT: NC/AT. EOMI. MMM.   Lungs:  Symmetrical.  CTAB with no W/R/R.  Good air movement   Cardiovascular:  S1/ loud S2, irregularly irregular rhythm, regular rate without M/R/G.  Abdomen:  Abdomen is soft NT/ND. +BS.  An easily reducible ventral hernia is present.  Extremities:  Full range of motion.  There is a tophaceous deformity of the right first tarsometatarsal joint, and one on the left second toe. 2+ pulses in all extremities. No C/C/E   CNS:  Muscle tone is normal.  Strength is grossly intact and symmetric.  Sensation grossly intact       LABS:  Recent Labs      04/13/19   1219   WBC  6.4   RBC  4.32*   HEMOGLOBIN  14.1   HEMATOCRIT  44.0   MCV  101.9*   MCH  32.6   RDW  54.4*   PLATELETCT  159*   MPV  10.5   NEUTSPOLYS  74.40*   LYMPHOCYTES  13.50*   MONOCYTES  7.10   EOSINOPHILS  3.80   BASOPHILS  0.60     Recent Labs      04/13/19   1219  04/14/19   0050   SODIUM  142  140   POTASSIUM  4.4  4.1   CHLORIDE  108  109   CO2  19*  25 "   BUN  14  16   CREATININE  1.27  1.29   CALCIUM  8.3*  8.5   MAGNESIUM   --   1.9   ALBUMIN  3.9   --      Estimated GFR/CRCL = Estimated Creatinine Clearance: 73.7 mL/min (by C-G formula based on SCr of 1.29 mg/dL).  Recent Labs      04/13/19 1219 04/13/19 2059 04/14/19   0050   GLUCOSE  149*   --   117*   POCGLUCOSE   --   107*   --      Recent Labs      04/13/19 1219 04/13/19 1740 04/14/19   0050   ASTSGOT  15   --    --    ALTSGPT  8   --    --    TBILIRUBIN  0.9   --    --    ALKPHOSPHAT  56   --    --    GLOBULIN  1.6*   --    --    INR   --   3.62*  3.92*     Recent Labs      04/13/19 1219 04/13/19 1740 04/14/19   0050   TROPONINI  <0.01  <0.01  0.01   BNPBTYPENAT  37   --    --          Recent Labs      04/13/19 1740 04/14/19   0050   INR  3.62*  3.92*       IMAGING:   US-EXTREMITY VENOUS LOWER BILAT   Final Result      DX-CHEST-PORTABLE (1 VIEW)   Final Result         1.  Apparent cardiac enlargement again noted.      2.  No new infiltrates or consolidations identified.      NM-CARDIAC STRESS TEST    (Results Pending)   No DVT    MEDS:  Current Facility-Administered Medications   Medication Last Dose   • acetaminophen (TYLENOL) tablet 650 mg 650 mg at 04/13/19 1831   • metoprolol (LOPRESSOR) tablet 25 mg 25 mg at 04/14/19 0646   • tamsulosin (FLOMAX) capsule 0.4 mg 0.4 mg at 04/13/19 1833   • warfarin (COUMADIN) tablet 4 mg 4 mg at 04/13/19 2054   • [START ON 4/15/2019] warfarin (COUMADIN) tablet 5 mg     • senna-docusate (PERICOLACE or SENOKOT S) 8.6-50 MG per tablet 2 Tab      And   • polyethylene glycol/lytes (MIRALAX) PACKET 1 Packet      And   • magnesium hydroxide (MILK OF MAGNESIA) suspension 30 mL      And   • bisacodyl (DULCOLAX) suppository 10 mg     • aspirin (ASA) tablet 325 mg     • MD Alert...Warfarin per Physician     • insulin regular (HUMULIN R) injection 1-6 Units Stopped at 04/13/19 2100    And   • glucose 4 g chewable tablet 16 g      And   • dextrose 50% (D50W)  injection 25 mL     • morphine (pf) 4 mg/ml injection 2 mg 2 mg at 04/13/19 3627       PROBLEM LIST:  No problems updated.    ASSESSMENT/PLAN:   63 y.o. male admitted for chest pain, palpitations and evaluation for ACS    #Chest pain  Troponin negative x3, EKG without ischemic changes.  Last stress test was 14 months ago and was negative.    Plan:  -Repeat EKGs with any episodes of chest pain  -Awaiting chemical stress test results     #Atrial fibrillation on chronic anticoagulation  Patient reports compliance with his Coumadin regime.  He takes 5 mg Monday Wednesday Friday, and 4 mg Tuesday Thursday Saturday and Sunday.  He reports compliance with his home dose of metoprolol 25 twice daily  -INR 3.6-3.9 overnight, slightly supratherapeutic  - HR improved today - now 80s-90s  Plan:  -Monitor on telemetry  -Continue home metoprolol dose of 25 mg  -May decrease coumadin dose         #Sick sinus syndrome status post pacemaker  Patient reports last interrogation was at least a year ago but everything was normal at that time  EKG consistent with occasional paced beats per.  Monitor on telemetry     #DM type II  -Not on home medications  -ISS     #Gout  #Leg pain  Leg pain is very likely due to gout since he has not been taking his indomethacin  -Tylenol PRN pain  -Uric acid level pending  - No evidence of DVT on US     #Lab abnormalities  Patient has bicarb of 19 and anion gap of 15 in ED  These values normalized on repeat BMP     Core measures:  DVT PPX: On Coumadin  ABX: None  Lines: PIV  Fluids: None  PCP: Ivonne Wild MD  CODE STATUS: Full code    Dispo: Possible DC today pending stress test results

## 2019-04-14 NOTE — PROGRESS NOTES
Inpatient Anticoagulation Service Note    Date: 4/14/2019  Reason for Anticoagulation: Atrial Fibrillation   [id number] VASc Score: 4  HAS-BLED Score: 0     Hemoglobin Value: 14.1  Hematocrit Value: 44  Lab Platelet Value: (!) 159  Target INR: 2.0 to 3.0    INR from last 7 days     Date/Time INR Value    04/14/19 0050 (!)  3.92    04/13/19 1740 (!)  3.62        Dose from last 7 days     Date/Time Dose (mg)    04/14/19 1500  0    04/13/19 1900  4        Average Dose (mg):  (Warfarin 5 mg MWF and 4 mg AOD per medrec)  Significant Interactions: Not Applicable  Bridge Therapy: No   Reversal Agent Administered: Not Applicable    Assessment: The patient underwent a stress test.  Received a one time dose of aspirin.  H/H WNL, no overt s/sx of bleeding.  INR increased, and is supra-therapeutic.      Plan:  Will hold warfarin dose tonight.    Education Material Provided?: No (Established warfarin patient)  Pharmacist suggested discharge dosing: Patient's INR was supra-therapeutic on admit.  Consider reducing home warfarin regimen to 4 mg daily.  Recommend a follow up INR within 2 days of discharge.     Bethany Ley, Pharm.D., BCPS

## 2019-04-14 NOTE — ASSESSMENT & PLAN NOTE
Currently stable, on anticoagulation with warfarin  Patient INR is 3.6 today which is above goal of 2-3 for A. fib, and mitral valve (bovine)  Intermittently tachycardic will give extra dose of metoprolol and will continue patient on metoprolol 25 twice daily  Consider increasing metoprolol as patient has a ICD in place

## 2019-04-14 NOTE — RESPIRATORY CARE
COPD EDUCATION by COPD CLINICAL EDUCATOR  4/14/2019 at 7:27 AM by Britt Del Real     Patient reviewed by COPD education team. Patient does not qualify for COPD program.

## 2019-04-14 NOTE — PROGRESS NOTES
Report received from Shahnaz AKERS.  Patient sitting up in bed watching TV.  No family at bedside.  Patient A & O x 4.  No apparent signs of distress.  Safety precautions in place.  Patient educated to call for assistance.  Will continue to monitor.

## 2019-04-14 NOTE — ASSESSMENT & PLAN NOTE
Patient has right calf pain and a history of a right lower extremity DVT  Currently supratherapeutic on warfarin  Low to moderate clinical suspicion of DVT  We will get lower extremity ultrasound  WELLs for DVT of 2, moderate risk

## 2019-04-14 NOTE — ASSESSMENT & PLAN NOTE
Frequent PVCs since presentation to ER   K+ WNL   Mg pending   Cardiac monitoring   Patient does have ICD that appears to be function   Consider interrogation

## 2019-04-14 NOTE — PROGRESS NOTES
"   Senior Admit Note       PATIENT ID:  NAME:  Morales Olivier  MRN:               [id number]  YOB: 1955    Date of Admission: 4/13/2019      Attending: RHONDA De La O M.D.   Senior Resident: Morales Muller M.D. (PGY-2)  Gavin Resident: Haydee Perez M.D. (PGY-1)    Primary Care Physician:  Ivonne Wild M.D.    CC:  Chest pain      A/P: Mr. 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 History of SSS s/p pacer/ICD placement. The patient presents with an acute onset of chest pain, with initially negative troponins.  Patient also complains of right great toe pain that feels like, \"gout.\"  Patient is also complaining of right calf pain and swelling for several weeks.       General: Pt resting in NAD, cooperative   Skin:  Pink, warm and dry.  No rashes  Neck:  Supple without lymphadenopathy or rigidity. No JVD   Lungs:  Symmetrical.  CTAB with no W/R/R.  Good air movement   Cardiovascular:  S1/S2 irregularly irregular rhythm, tachycardic without M/R/G.  Extremities: There is a tophaceous deformity of the right first tarsometatarsal joint, and one on the left second toe. 2+ pulses in all extremities. Pain on palpation of R calf.   CNS:  Muscle tone is normal.  Strength is grossly intact and symmetric.  Sensation grossly intact      Chest pain   Assessment & Plan    Currently chest pain-free, without acute changes noted on EKG  Admit for cardiac monitoring and possible stress test in the morning  Another differential considered is pulmonary embolism given the patient's chest pain and clinical signs of lower extremity DVT.  Given that the patient has other explanations for his tachycardia, chest pain and that the patient is currently supratherapeutic on warfarin this differential is felt to be less likely  We will get lower extremity ultrasound to evaluate for DVT and will start " anticoagulation with heparin and consider treatment failure if this is present.  Consider pacer interrogation   HEART Score 4 (Risk of major cardiac event 12-16.6%)  WELLs for PE of 6, moderate risk (16.2% probability of PE in study population)     Right calf pain   Assessment & Plan    Patient has right calf pain and a history of a right lower extremity DVT  Currently supratherapeutic on warfarin  Low to moderate clinical suspicion of DVT  We will get lower extremity ultrasound  WELLs for DVT of 2, moderate risk      Atrial fibrillation (HCC)   Assessment & Plan    Currently stable, on anticoagulation with warfarin  Patient INR is 3.6 today which is above goal of 2-3 for A. fib, and mitral valve (bovine)  Intermittently tachycardic will give extra dose of metoprolol and will continue patient on metoprolol 25 twice daily  Consider increasing metoprolol as patient has a ICD in place     PVC (premature ventricular contraction)   Assessment & Plan    Frequent PVCs since presentation to ER   K+ WNL   Mg pending   Cardiac monitoring   Patient does have ICD that appears to be function   Consider interrogation      H/O mitral valve replacement   Assessment & Plan    Currently slightly supratherapeutic on warfarin continue current therapy no signs of acute change in bowel function today       Case discussed with attending Dr. De La O and Intern Haydee Perez M.D, see Dr. Perez' H&P for details.      Morales Muller M.D.   PGY-2  [facility]   [phone]

## 2019-04-14 NOTE — CARE PLAN
Problem: Safety  Goal: Will remain free from falls  Outcome: PROGRESSING AS EXPECTED  Pt educated on safety precautions, utilization of the call light, and bed alarm.  Pt verbalized understanding.    Problem: Pain Management  Goal: Pain level will decrease to patient's comfort goal  Outcome: PROGRESSING AS EXPECTED  Pt educated about non-pharmacological methods of pain relief.  Pt will also be medicated as prescribed through the MAR as well.

## 2019-04-14 NOTE — ASSESSMENT & PLAN NOTE
Currently slightly supratherapeutic on warfarin continue current therapy no signs of acute change in bowel function today

## 2019-04-15 VITALS
SYSTOLIC BLOOD PRESSURE: 144 MMHG | WEIGHT: 255.29 LBS | DIASTOLIC BLOOD PRESSURE: 94 MMHG | HEIGHT: 70 IN | HEART RATE: 94 BPM | TEMPERATURE: 96.8 F | RESPIRATION RATE: 16 BRPM | BODY MASS INDEX: 36.55 KG/M2 | OXYGEN SATURATION: 94 %

## 2019-04-15 LAB
GLUCOSE BLD-MCNC: 161 MG/DL (ref 65–99)
GLUCOSE BLD-MCNC: 86 MG/DL (ref 65–99)
INR PPP: 3.59 (ref 0.87–1.13)
PROTHROMBIN TIME: 35.8 SEC (ref 12–14.6)

## 2019-04-15 PROCEDURE — G0378 HOSPITAL OBSERVATION PER HR: HCPCS

## 2019-04-15 PROCEDURE — 96376 TX/PRO/DX INJ SAME DRUG ADON: CPT

## 2019-04-15 PROCEDURE — 700111 HCHG RX REV CODE 636 W/ 250 OVERRIDE (IP): Performed by: STUDENT IN AN ORGANIZED HEALTH CARE EDUCATION/TRAINING PROGRAM

## 2019-04-15 PROCEDURE — 700102 HCHG RX REV CODE 250 W/ 637 OVERRIDE(OP): Performed by: STUDENT IN AN ORGANIZED HEALTH CARE EDUCATION/TRAINING PROGRAM

## 2019-04-15 PROCEDURE — 82962 GLUCOSE BLOOD TEST: CPT

## 2019-04-15 PROCEDURE — 700102 HCHG RX REV CODE 250 W/ 637 OVERRIDE(OP): Performed by: FAMILY MEDICINE

## 2019-04-15 PROCEDURE — 85610 PROTHROMBIN TIME: CPT

## 2019-04-15 PROCEDURE — A9270 NON-COVERED ITEM OR SERVICE: HCPCS | Performed by: STUDENT IN AN ORGANIZED HEALTH CARE EDUCATION/TRAINING PROGRAM

## 2019-04-15 PROCEDURE — A9270 NON-COVERED ITEM OR SERVICE: HCPCS | Performed by: FAMILY MEDICINE

## 2019-04-15 PROCEDURE — 36415 COLL VENOUS BLD VENIPUNCTURE: CPT

## 2019-04-15 PROCEDURE — 96372 THER/PROPH/DIAG INJ SC/IM: CPT

## 2019-04-15 RX ORDER — WARFARIN SODIUM 4 MG/1
4 TABLET ORAL
Status: DISCONTINUED | OUTPATIENT
Start: 2019-04-15 | End: 2019-04-15

## 2019-04-15 RX ORDER — INDOMETHACIN 50 MG/1
50 CAPSULE ORAL
Status: DISCONTINUED | OUTPATIENT
Start: 2019-04-15 | End: 2019-04-15 | Stop reason: HOSPADM

## 2019-04-15 RX ORDER — INDOMETHACIN 50 MG/1
50 CAPSULE ORAL
Qty: 90 CAP | Refills: 0 | Status: ON HOLD | OUTPATIENT
Start: 2019-04-15 | End: 2019-04-18

## 2019-04-15 RX ORDER — WARFARIN SODIUM 2 MG/1
2 TABLET ORAL
Status: DISCONTINUED | OUTPATIENT
Start: 2019-04-15 | End: 2019-04-15 | Stop reason: HOSPADM

## 2019-04-15 RX ADMIN — INSULIN HUMAN 1 UNITS: 100 INJECTION, SOLUTION PARENTERAL at 12:13

## 2019-04-15 RX ADMIN — INDOMETHACIN 50 MG: 50 CAPSULE ORAL at 12:13

## 2019-04-15 RX ADMIN — INDOMETHACIN 50 MG: 50 CAPSULE ORAL at 09:33

## 2019-04-15 RX ADMIN — MORPHINE SULFATE 2 MG: 4 INJECTION INTRAVENOUS at 00:52

## 2019-04-15 RX ADMIN — METOPROLOL TARTRATE 25 MG: 25 TABLET, FILM COATED ORAL at 06:41

## 2019-04-15 NOTE — DISCHARGE INSTRUCTIONS
Discharge Instructions    Discharged to home by car with self. Discharged via walking, hospital escort: Yes.  Special equipment needed: Cane    Be sure to schedule a follow-up appointment with your primary care doctor or any specialists as instructed.     Discharge Plan:   Diet Plan: Discussed  Activity Level: Discussed  Confirmed Follow up Appointment: Patient to Call and Schedule Appointment  Medication Reconciliation Updated: Yes  Pneumococcal Vaccine Administered/Refused: Not given - Patient refused pneumococcal vaccine  Influenza Vaccine Indication: Patient Refuses    I understand that a diet low in cholesterol, fat, and sodium is recommended for good health. Unless I have been given specific instructions below for another diet, I accept this instruction as my diet prescription.   Other diet: Diabetic diet    Special Instructions: None    · Is patient discharged on Warfarin / Coumadin?   No     Depression / Suicide Risk    As you are discharged from this [facility], it is important to learn how to keep safe from harming yourself.    Recognize the warning signs:  · Abrupt changes in personality, positive or negative- including increase in energy   · Giving away possessions  · Change in eating patterns- significant weight changes-  positive or negative  · Change in sleeping patterns- unable to sleep or sleeping all the time   · Unwillingness or inability to communicate  · Depression  · Unusual sadness, discouragement and loneliness  · Talk of wanting to die  · Neglect of personal appearance   · Rebelliousness- reckless behavior  · Withdrawal from people/activities they love  · Confusion- inability to concentrate     If you or a loved one observes any of these behaviors or has concerns about self-harm, here's what you can do:  · Talk about it- your feelings and reasons for harming yourself  · Remove any means that you might use to hurt yourself (examples: pills, rope, extension cords, firearm)  · Get  professional help from the community (Mental Health, Substance Abuse, psychological counseling)  · Do not be alone:Call your Safe Contact- someone whom you trust who will be there for you.  · Call your local CRISIS HOTLINE 531-2786 or 481-389-6235  · Call your local Children's Mobile Crisis Response Team [place] (602) 753-8583 or www.Venturepax  · Call the toll free National Suicide Prevention Hotlines   · National Suicide Prevention Lifeline 649-538-RGHI (9713)  · National Hope Line Network 800-SUICIDE (411-6466)

## 2019-04-15 NOTE — CARE PLAN
Problem: Safety  Goal: Will remain free from falls  Outcome: PROGRESSING AS EXPECTED  Pt educated on safety precautions, utilization of the call light, and bed alarm.  Pt verbalized understanding.    Problem: Pain Management  Goal: Pain level will decrease to patient's comfort goal  Outcome: PROGRESSING AS EXPECTED  Educated pt regarding non-pharmacological pain relief as well as what medications are available as prescribed per his MAR.

## 2019-04-15 NOTE — DISCHARGE SUMMARY
PATIENT DISCHARGE SUMMARY     Admit Date:  4/13/2019       Discharge Date:   4/15/2019    Service:   [facility] Family Medicine Team  Attending Physician(s):   Aan De La O MD       Senior Resident(s):   Deepa Govea MD, Elliott Hugo DO  Gavin Resident(s):   Haydee Perez MD      Admission Diagnosis:   Chest pain  Palpitations  Atrial fibrillation  Chronic Anticoagulation use  Sick sinus syndrome  Pacemaker in situ  Gout  Prediabetes  Right lower extremity pain    Discharge Diagnoses:                Chest pain - resolved  Palpitations - resolved  Atrial fibrillation  Chronic Anticoagulation use  Sick sinus syndrome  Pacemaker in situ  Gout  Prediabetes  Right lower extremity pain    HPI Summary:       Morales Olivier is a 63 y.o. male with a history of A. 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 He also describes shortness of breath with exertion.    Patient /Hospital Summary:        Patient admitted for chest pain and evaluation of possible ACS.  Serial troponins were performed which were negative.  There were no ischemic changes noted on ECG.  A chemical stress test performed, which was read as high normal.  Patient had elevated heart rates into the 140s with exertion noted on admission, so metoprolol dose was increased to 20 5 in the morning and 50 mg at night.  This improved his heart rate and blood pressure.  Patient complained of right lower extremity pain.  Despite therapeutic INR, and ultrasound of the right lower extremity was performed to rule out DVT.  There was no DVT noted.  On the day of the discharge, the patient's INR was noted to be supratherapeutic at 3.9.  He was instructed to hold 1 dose of his Coumadin.  During the course of admission, the patient's right foot pain at the tophaceous joint  worsened.  He was prescribed indomethacin, given instructions to follow-up with his primary care doctor, and discharged in a stable condition.  Of note, we recommend the initiation of allopurinol after acute gout symptoms resolved, and would recommend considering starting the patient on a statin.  Patient instructed to follow-up with his primary care doctor.    Consultants:      None    Procedures:        Chemical stress test    Imaging/ Testing:        Recent Labs      04/16/19   1325   WBC  8.4   RBC  4.34*   HEMOGLOBIN  14.4   HEMATOCRIT  43.5   MCV  100.2*   MCH  33.2*   RDW  52.6*   PLATELETCT  146*   MPV  9.7   NEUTSPOLYS  78.40*   LYMPHOCYTES  9.60*   MONOCYTES  8.00   EOSINOPHILS  3.00   BASOPHILS  0.60     Recent Labs      04/14/19   0050  04/16/19   1325   SODIUM  140  137   POTASSIUM  4.1  4.6   CHLORIDE  109  102   CO2  25  27   GLUCOSE  117*  86   BUN  16  20     Recent Labs      04/14/19   0050  04/16/19   1325   ALBUMIN   --   4.1   TBILIRUBIN   --   1.6*   ALKPHOSPHAT   --   57   TOTPROTEIN   --   6.2   ALTSGPT   --   8   ASTSGOT   --   12   CREATININE  1.29  1.34     Results     ** No results found for the last 168 hours. **        NM-CARDIAC STRESS TEST   Final Result      US-EXTREMITY VENOUS LOWER BILAT   Final Result      DX-CHEST-PORTABLE (1 VIEW)   Final Result         1.  Apparent cardiac enlargement again noted.      2.  No new infiltrates or consolidations identified.      Myocardial Perfusion   Report   NUCLEAR IMAGING INTERPRETATION   No evidence of significant jeopardized viable myocardium or prior myocardial    infarction.   Normal left ventricular size, ejection fraction, and wall motion.   ECG INTERPRETATION   Non diagnositic stress ECG due to underlying rhythm    Discharge Medications:           Medication List      CHANGE how you take these medications      Instructions   indomethacin 50 MG Caps  What changed:  · when to take this  · reasons to take this  Commonly known as:   INDOCIN   Take 1 Cap by mouth 3 times a day, with meals.  Dose:  50 mg        CONTINUE taking these medications      Instructions   acetaminophen 500 MG Tabs  Commonly known as:  TYLENOL   Take 500-1,000 mg by mouth every 6 hours as needed for Moderate Pain.  Dose:  500-1000 mg     tamsulosin 0.4 MG capsule  Commonly known as:  FLOMAX   Take 0.4 mg by mouth every evening.  Dose:  0.4 mg     warfarin 5 MG Tabs  Commonly known as:  COUMADIN   Take 4-5 mg by mouth every day. 4 MG on Sun, Tues, Thurs, Sat 5MG on Mon, Wed, and Fri  Dose:  4-5 mg        STOP taking these medications    metoprolol 25 MG Tabs  Commonly known as:  LOPRESSOR                 Disposition:   Home      Instructions:       Hold coumadin dose tonight Increase your metoprolol dose to 25mg in the morning, and 50mg at night Start taking indomethacin for the next 2 weeks. Discuss starting allopurinol and simvastatin with Dr. Wild at your follow up visit. Return to the ED if symptoms return or worsen such as chest pain, palpitations, shortness of breath, or other concerns ariseThe patient was instructed to return to the ER in the event of worsening symptoms. I have counseled the patient on the importance of compliance and the patient has agreed to proceed with all medical recommendations and follow up plan indicated above.   The patient understands that all medications come with benefits and risks. Risks may include permanent injury or death and these risks can be minimized with close reassessment and monitoring.        Primary Care Provider:      Ivonne Wild MD [facility]        Follow up appointment details :      Has follow-up appointment with Dr. Wild in 1 week        CC: Ivonne Wild MD

## 2019-04-15 NOTE — PROGRESS NOTES
Un able to get medication to pay for. Patient verbalized not able to pay anything for now. Update [facility].

## 2019-04-15 NOTE — PROGRESS NOTES
Report received from Karen AKERS.  Patient sitting up in bed watching TV.  Patient A & O x 4.  No apparent signs of distress.  Safety precautions in place.  Patient educated to call for assistance.  Will continue to monitor.

## 2019-04-15 NOTE — DISCHARGE PLANNING
Care Transition Team Assessment    Spoke with patient at bedside. Anticipate possible need for bus pass has no ride @ present.    Information Source  Orientation : Oriented x 4  Information Given By: Patient  Informant's Name: Morales Olivier    Readmission Evaluation  Is this a readmission?: No    Interdisciplinary Discharge Planning  Does Admitting Nurse Feel This Could be a Complex Discharge?: No  Primary Care Physician: Junito  Lives with - Patient's Self Care Capacity: Alone and Able to Care For Self  Patient or legal guardian wants to designate a caregiver (see row info): No  Support Systems: Friends / Neighbors  Housing / Facility: [facility]  Do You Take your Prescribed Medications Regularly: Yes  Able to Return to Previous ADL's: Yes  Mobility Issues: Yes  Prior Services: None  Patient Expects to be Discharged to:: Home  Assistance Needed: No  Durable Medical Equipment: Other - Specify (Cane)    Discharge Preparedness  What are your discharge supports?: Other (comment) (Friend)  Prior Functional Level: Uses Cane    Functional Assesment  Prior Functional Level: Uses Cane    Finances  Prescription Coverage: Yes    Anticipated Discharge Information  Anticipated discharge disposition: Home  Discharge Address: [address] [place] [address] [address]  Discharge Contact Phone Number: [phone]

## 2019-04-15 NOTE — PROGRESS NOTES
[facility] MEDICINE PROGRESS NOTE     Attending:   Ana De La O MD    Resident:   Haydee Perez MD    PATIENT: Morales Olivier; [id number]; 1955    ID: 63 y.o. male admitted for chest pain, palpitations, ACS work up    SUBJECTIVE: No acute events overnight. Reports his biggest issue today is foot pain in his gouty toe. Chest pain and palpitations are resolved. He is concerned about the gout pain since he has no money to pay for prescriptions and has run out of his indomethacin.    OBJECTIVE:     Vitals:    04/14/19 1712 04/14/19 1945 04/15/19 0029 04/15/19 0423   BP: 141/86 146/91 151/80 135/100   Pulse: 80 80 80 80   Resp: 18 20 20 17   Temp: 36.8 °C (98.2 °F) 36.3 °C (97.4 °F) 37.4 °C (99.3 °F) 36.3 °C (97.4 °F)   TempSrc: Temporal Temporal Temporal Temporal   SpO2: 96% 95% 94% 94%   Weight:       Height:           Intake/Output Summary (Last 24 hours) at 04/15/19 0627  Last data filed at 04/15/19 0030   Gross per 24 hour   Intake              240 ml   Output              200 ml   Net               40 ml       PE:   General: Pt resting in NAD, cooperative   Skin:  Pink, warm and dry.  No rashes  HEENT: NC/AT. EOMI. MMM.   Lungs:  Symmetrical.  CTAB with no W/R/R.  Good air movement   Cardiovascular:  S1/ loud S2, irregularly irregular rhythm, regular rate without M/R/G.  Abdomen:  Abdomen is soft NT/ND. +BS.  An easily reducible ventral hernia is present.  Extremities:  Full range of motion.  There is a tophaceous deformity of the right first tarsometatarsal joint which is very tender to palpation, and one on the left second toe. 2+ pulses in all extremities. No C/C/E   CNS:  Muscle tone is normal.  Strength is grossly intact and symmetric.  Sensation grossly intact    LABS:  Recent Labs      04/13/19   1219   WBC  6.4   RBC  4.32*   HEMOGLOBIN  14.1   HEMATOCRIT  44.0   MCV  101.9*   MCH  32.6   RDW  54.4*   PLATELETCT  159*   MPV  10.5   NEUTSPOLYS  74.40*   LYMPHOCYTES  13.50*   MONOCYTES  7.10    EOSINOPHILS  3.80   BASOPHILS  0.60     Recent Labs      04/13/19 1219 04/14/19   0050   SODIUM  142  140   POTASSIUM  4.4  4.1   CHLORIDE  108  109   CO2  19*  25   BUN  14  16   CREATININE  1.27  1.29   CALCIUM  8.3*  8.5   MAGNESIUM   --   1.9   ALBUMIN  3.9   --      Estimated GFR/CRCL = Estimated Creatinine Clearance: 73.7 mL/min (by C-G formula based on SCr of 1.29 mg/dL).  Recent Labs      04/13/19 1219 04/14/19   0050 04/14/19   1133  04/14/19   1752 04/14/19   2159   GLUCOSE  149*   --   117*   --    --    --    --    POCGLUCOSE   --    < >   --    < >  149*  119*  130*    < > = values in this interval not displayed.     Recent Labs      04/13/19   1219  04/13/19   1740  04/14/19   0050  04/15/19   0055   ASTSGOT  15   --    --    --    ALTSGPT  8   --    --    --    TBILIRUBIN  0.9   --    --    --    ALKPHOSPHAT  56   --    --    --    GLOBULIN  1.6*   --    --    --    INR   --   3.62*  3.92*  3.59*     Recent Labs      04/13/19 1219 04/13/19 1740 04/14/19 0050   TROPONINI  <0.01  <0.01  0.01   BNPBTYPENAT  37   --    --          Recent Labs      04/13/19   1740  04/14/19   0050  04/15/19   0055   INR  3.62*  3.92*  3.59*         IMAGING:   NM-CARDIAC STRESS TEST   Final Result      US-EXTREMITY VENOUS LOWER BILAT   Final Result      DX-CHEST-PORTABLE (1 VIEW)   Final Result         1.  Apparent cardiac enlargement again noted.      2.  No new infiltrates or consolidations identified.          MEDS:  Current Facility-Administered Medications   Medication Last Dose   • warfarin (COUMADIN) tablet 4 mg     • acetaminophen (TYLENOL) tablet 650 mg 650 mg at 04/14/19 1551   • metoprolol (LOPRESSOR) tablet 25 mg 25 mg at 04/14/19 1756   • tamsulosin (FLOMAX) capsule 0.4 mg 0.4 mg at 04/14/19 1756   • senna-docusate (PERICOLACE or SENOKOT S) 8.6-50 MG per tablet 2 Tab      And   • polyethylene glycol/lytes (MIRALAX) PACKET 1 Packet      And   • magnesium hydroxide (MILK OF MAGNESIA)  suspension 30 mL      And   • bisacodyl (DULCOLAX) suppository 10 mg     • MD Alert...Warfarin per Physician     • insulin regular (HUMULIN R) injection 1-6 Units Stopped at 04/13/19 2100    And   • glucose 4 g chewable tablet 16 g      And   • dextrose 50% (D50W) injection 25 mL     • morphine (pf) 4 mg/ml injection 2 mg 2 mg at 04/15/19 0052       PROBLEM LIST:  No problems updated.    ASSESSMENT/PLAN:   63 y.o. male admitted for chest pain, palpitations and evaluation for ACS     #Chest pain  Troponin negative x3, EKG without ischemic changes.  Last stress test was 14 months ago and was negative.     Plan:  - Chemical stress test negative for ischemic changes  - Recommend starting a statin drug. Will prescribe atorvastatin on DC     #Atrial fibrillation on chronic anticoagulation  Patient reports compliance with his Coumadin regime.  He takes 5 mg Monday Wednesday Friday, and 4 mg Tuesday Thursday Saturday and Sunday.  He reports compliance with his home dose of metoprolol 25 twice daily  -INR 3.6 - 3.9 - 3.56, still slightly supratherapeutic. Patient reports his PCP normally instructs him to hold a dose, but keeps his regime the same  - HR improved today - now 80s-90s  Plan:  -Monitor on telemetry  - Will increase home metoprolol dose to 25 mg q am and 50mg q pm  - Recommend holding coumadin tonight and rechecking INR in 1 week           #Sick sinus syndrome status post pacemaker  Patient reports last interrogation was at least a year ago but everything was normal at that time  EKG consistent with occasional paced beats per.  Monitor on telemetry     #DM type II  -Not on home medications  -ISS     #Gout  #Leg pain  Leg pain is very likely due to gout since he has not been taking his indomethacin  -Tylenol PRN pain  -Uric acid level 9  - No evidence of DVT on US  - Will discharge with rx for indomethicin. Recommend starting allopurinol after acute flare resolves     #Lab abnormalities  Patient has bicarb of 19  and anion gap of 15 in ED  These values normalized on repeat BMP     Core measures:  DVT PPX: On Coumadin  ABX: None  Lines: PIV  Fluids: None  PCP: Ivonne Wild MD  CODE STATUS: Full code     Dispo: DC today

## 2019-04-16 ENCOUNTER — HOSPITAL ENCOUNTER (OUTPATIENT)
Facility: MEDICAL CENTER | Age: 64
End: 2019-04-18
Attending: EMERGENCY MEDICINE | Admitting: FAMILY MEDICINE
Payer: MEDICARE

## 2019-04-16 ENCOUNTER — APPOINTMENT (OUTPATIENT)
Dept: RADIOLOGY | Facility: MEDICAL CENTER | Age: 64
End: 2019-04-16
Attending: EMERGENCY MEDICINE
Payer: MEDICARE

## 2019-04-16 DIAGNOSIS — M10.9 ACUTE GOUT INVOLVING TOE OF RIGHT FOOT, UNSPECIFIED CAUSE: ICD-10-CM

## 2019-04-16 DIAGNOSIS — R07.89 ATYPICAL CHEST PAIN: ICD-10-CM

## 2019-04-16 DIAGNOSIS — I10 HYPERTENSION, UNSPECIFIED TYPE: ICD-10-CM

## 2019-04-16 LAB
ALBUMIN SERPL BCP-MCNC: 4.1 G/DL (ref 3.2–4.9)
ALBUMIN/GLOB SERPL: 2 G/DL
ALP SERPL-CCNC: 57 U/L (ref 30–99)
ALT SERPL-CCNC: 8 U/L (ref 2–50)
ANION GAP SERPL CALC-SCNC: 8 MMOL/L (ref 0–11.9)
AST SERPL-CCNC: 12 U/L (ref 12–45)
BASOPHILS # BLD AUTO: 0.6 % (ref 0–1.8)
BASOPHILS # BLD: 0.05 K/UL (ref 0–0.12)
BILIRUB SERPL-MCNC: 1.6 MG/DL (ref 0.1–1.5)
BUN SERPL-MCNC: 20 MG/DL (ref 8–22)
CALCIUM SERPL-MCNC: 9.1 MG/DL (ref 8.5–10.5)
CHLORIDE SERPL-SCNC: 102 MMOL/L (ref 96–112)
CO2 SERPL-SCNC: 27 MMOL/L (ref 20–33)
CREAT SERPL-MCNC: 1.34 MG/DL (ref 0.5–1.4)
EKG IMPRESSION: NORMAL
EOSINOPHIL # BLD AUTO: 0.25 K/UL (ref 0–0.51)
EOSINOPHIL NFR BLD: 3 % (ref 0–6.9)
ERYTHROCYTE [DISTWIDTH] IN BLOOD BY AUTOMATED COUNT: 52.6 FL (ref 35.9–50)
GLOBULIN SER CALC-MCNC: 2.1 G/DL (ref 1.9–3.5)
GLUCOSE SERPL-MCNC: 86 MG/DL (ref 65–99)
HCT VFR BLD AUTO: 43.5 % (ref 42–52)
HGB BLD-MCNC: 14.4 G/DL (ref 14–18)
IMM GRANULOCYTES # BLD AUTO: 0.03 K/UL (ref 0–0.11)
IMM GRANULOCYTES NFR BLD AUTO: 0.4 % (ref 0–0.9)
INR PPP: 3.07 (ref 0.87–1.13)
LYMPHOCYTES # BLD AUTO: 0.81 K/UL (ref 1–4.8)
LYMPHOCYTES NFR BLD: 9.6 % (ref 22–41)
MCH RBC QN AUTO: 33.2 PG (ref 27–33)
MCHC RBC AUTO-ENTMCNC: 33.1 G/DL (ref 33.7–35.3)
MCV RBC AUTO: 100.2 FL (ref 81.4–97.8)
MONOCYTES # BLD AUTO: 0.67 K/UL (ref 0–0.85)
MONOCYTES NFR BLD AUTO: 8 % (ref 0–13.4)
NEUTROPHILS # BLD AUTO: 6.59 K/UL (ref 1.82–7.42)
NEUTROPHILS NFR BLD: 78.4 % (ref 44–72)
NRBC # BLD AUTO: 0 K/UL
NRBC BLD-RTO: 0 /100 WBC
PLATELET # BLD AUTO: 146 K/UL (ref 164–446)
PMV BLD AUTO: 9.7 FL (ref 9–12.9)
POTASSIUM SERPL-SCNC: 4.6 MMOL/L (ref 3.6–5.5)
PROT SERPL-MCNC: 6.2 G/DL (ref 6–8.2)
PROTHROMBIN TIME: 31.8 SEC (ref 12–14.6)
RBC # BLD AUTO: 4.34 M/UL (ref 4.7–6.1)
SODIUM SERPL-SCNC: 137 MMOL/L (ref 135–145)
TROPONIN I SERPL-MCNC: 0.01 NG/ML (ref 0–0.04)
WBC # BLD AUTO: 8.4 K/UL (ref 4.8–10.8)

## 2019-04-16 PROCEDURE — 700102 HCHG RX REV CODE 250 W/ 637 OVERRIDE(OP): Performed by: STUDENT IN AN ORGANIZED HEALTH CARE EDUCATION/TRAINING PROGRAM

## 2019-04-16 PROCEDURE — 83036 HEMOGLOBIN GLYCOSYLATED A1C: CPT

## 2019-04-16 PROCEDURE — G0378 HOSPITAL OBSERVATION PER HR: HCPCS

## 2019-04-16 PROCEDURE — 85610 PROTHROMBIN TIME: CPT

## 2019-04-16 PROCEDURE — 80053 COMPREHEN METABOLIC PANEL: CPT

## 2019-04-16 PROCEDURE — 99285 EMERGENCY DEPT VISIT HI MDM: CPT

## 2019-04-16 PROCEDURE — 96374 THER/PROPH/DIAG INJ IV PUSH: CPT

## 2019-04-16 PROCEDURE — 84484 ASSAY OF TROPONIN QUANT: CPT

## 2019-04-16 PROCEDURE — A9270 NON-COVERED ITEM OR SERVICE: HCPCS | Performed by: EMERGENCY MEDICINE

## 2019-04-16 PROCEDURE — 85025 COMPLETE CBC W/AUTO DIFF WBC: CPT

## 2019-04-16 PROCEDURE — 71045 X-RAY EXAM CHEST 1 VIEW: CPT

## 2019-04-16 PROCEDURE — 36415 COLL VENOUS BLD VENIPUNCTURE: CPT

## 2019-04-16 PROCEDURE — 700102 HCHG RX REV CODE 250 W/ 637 OVERRIDE(OP): Performed by: EMERGENCY MEDICINE

## 2019-04-16 PROCEDURE — 93005 ELECTROCARDIOGRAM TRACING: CPT

## 2019-04-16 PROCEDURE — A9270 NON-COVERED ITEM OR SERVICE: HCPCS | Performed by: STUDENT IN AN ORGANIZED HEALTH CARE EDUCATION/TRAINING PROGRAM

## 2019-04-16 PROCEDURE — 700111 HCHG RX REV CODE 636 W/ 250 OVERRIDE (IP): Performed by: STUDENT IN AN ORGANIZED HEALTH CARE EDUCATION/TRAINING PROGRAM

## 2019-04-16 PROCEDURE — 93005 ELECTROCARDIOGRAM TRACING: CPT | Performed by: EMERGENCY MEDICINE

## 2019-04-16 RX ORDER — AMOXICILLIN 250 MG
2 CAPSULE ORAL 2 TIMES DAILY
Status: DISCONTINUED | OUTPATIENT
Start: 2019-04-17 | End: 2019-04-18 | Stop reason: HOSPADM

## 2019-04-16 RX ORDER — MORPHINE SULFATE 4 MG/ML
2 INJECTION, SOLUTION INTRAMUSCULAR; INTRAVENOUS EVERY 4 HOURS PRN
Status: DISCONTINUED | OUTPATIENT
Start: 2019-04-16 | End: 2019-04-18 | Stop reason: HOSPADM

## 2019-04-16 RX ORDER — ACETAMINOPHEN 325 MG/1
650 TABLET ORAL EVERY 6 HOURS PRN
Status: DISCONTINUED | OUTPATIENT
Start: 2019-04-16 | End: 2019-04-18 | Stop reason: HOSPADM

## 2019-04-16 RX ORDER — WARFARIN SODIUM 5 MG/1
5 TABLET ORAL
Status: DISCONTINUED | OUTPATIENT
Start: 2019-04-17 | End: 2019-04-18 | Stop reason: HOSPADM

## 2019-04-16 RX ORDER — BISACODYL 10 MG
10 SUPPOSITORY, RECTAL RECTAL
Status: DISCONTINUED | OUTPATIENT
Start: 2019-04-16 | End: 2019-04-18 | Stop reason: HOSPADM

## 2019-04-16 RX ORDER — TAMSULOSIN HYDROCHLORIDE 0.4 MG/1
0.4 CAPSULE ORAL EVERY EVENING
Status: DISCONTINUED | OUTPATIENT
Start: 2019-04-16 | End: 2019-04-18 | Stop reason: HOSPADM

## 2019-04-16 RX ORDER — ENALAPRILAT 1.25 MG/ML
1.25 INJECTION INTRAVENOUS EVERY 6 HOURS PRN
Status: DISCONTINUED | OUTPATIENT
Start: 2019-04-16 | End: 2019-04-18 | Stop reason: HOSPADM

## 2019-04-16 RX ORDER — WARFARIN SODIUM 4 MG/1
4-5 TABLET ORAL DAILY
Status: DISCONTINUED | OUTPATIENT
Start: 2019-04-16 | End: 2019-04-16

## 2019-04-16 RX ORDER — POLYETHYLENE GLYCOL 3350 17 G/17G
1 POWDER, FOR SOLUTION ORAL
Status: DISCONTINUED | OUTPATIENT
Start: 2019-04-16 | End: 2019-04-18 | Stop reason: HOSPADM

## 2019-04-16 RX ORDER — WARFARIN SODIUM 4 MG/1
4 TABLET ORAL
Status: DISCONTINUED | OUTPATIENT
Start: 2019-04-16 | End: 2019-04-18 | Stop reason: HOSPADM

## 2019-04-16 RX ORDER — COLCHICINE 0.6 MG/1
0.6 TABLET ORAL DAILY
Status: DISCONTINUED | OUTPATIENT
Start: 2019-04-16 | End: 2019-04-18 | Stop reason: HOSPADM

## 2019-04-16 RX ORDER — METOPROLOL TARTRATE 50 MG/1
50 TABLET, FILM COATED ORAL ONCE
Status: COMPLETED | OUTPATIENT
Start: 2019-04-16 | End: 2019-04-16

## 2019-04-16 RX ORDER — ALLOPURINOL 100 MG/1
100 TABLET ORAL DAILY
Status: DISCONTINUED | OUTPATIENT
Start: 2019-04-16 | End: 2019-04-17

## 2019-04-16 RX ORDER — HYDRALAZINE HYDROCHLORIDE 20 MG/ML
10 INJECTION INTRAMUSCULAR; INTRAVENOUS EVERY 4 HOURS PRN
Status: DISCONTINUED | OUTPATIENT
Start: 2019-04-16 | End: 2019-04-18 | Stop reason: HOSPADM

## 2019-04-16 RX ADMIN — WARFARIN SODIUM 4 MG: 4 TABLET ORAL at 18:40

## 2019-04-16 RX ADMIN — TAMSULOSIN HYDROCHLORIDE 0.4 MG: 0.4 CAPSULE ORAL at 18:40

## 2019-04-16 RX ADMIN — METOPROLOL TARTRATE 50 MG: 25 TABLET ORAL at 20:57

## 2019-04-16 RX ADMIN — METOPROLOL TARTRATE 50 MG: 50 TABLET ORAL at 14:26

## 2019-04-16 RX ADMIN — ALLOPURINOL 100 MG: 100 TABLET ORAL at 14:25

## 2019-04-16 RX ADMIN — MORPHINE SULFATE 2 MG: 4 INJECTION INTRAVENOUS at 20:59

## 2019-04-16 RX ADMIN — COLCHICINE 0.6 MG: 0.6 TABLET, FILM COATED ORAL at 18:40

## 2019-04-16 RX ADMIN — ACETAMINOPHEN 650 MG: 325 TABLET, FILM COATED ORAL at 20:57

## 2019-04-16 ASSESSMENT — LIFESTYLE VARIABLES
ALCOHOL_USE: NO
EVER_SMOKED: YES
DO YOU DRINK ALCOHOL: NO

## 2019-04-16 ASSESSMENT — PATIENT HEALTH QUESTIONNAIRE - PHQ9
SUM OF ALL RESPONSES TO PHQ9 QUESTIONS 1 AND 2: 0
1. LITTLE INTEREST OR PLEASURE IN DOING THINGS: NOT AT ALL
2. FEELING DOWN, DEPRESSED, IRRITABLE, OR HOPELESS: NOT AT ALL

## 2019-04-16 NOTE — ED NOTES
Med Rec completed per patient  Allergies reviewed  No ORAL antibiotics in last 30 days    Coumadin as follows:4 MG on Sun, Tues, Thurs, Sat  5MG on Mon, Wed, and Fri

## 2019-04-16 NOTE — H&P
[facility] H[facility] AND PHYSICAL     PATIENT ID:  NAME:  Morales Olivier  MRN:               [id number]  YOB: 1955    Date of Admission:   4/16/2019     Attending:   Ana De La O MD    Resident:   Elliott Hugo DO, MPH    Primary Care Physician:   Ivonne Wild MD, [facility]    CC:    Foot pain and pain in chest    HPI:   Morales Olivier is a 64yo male who presents to the emergency department via EMS due to increased right foot pain, and sudden onset of chest pain.  Patient reports that he was at the bus stop and trying to find a way to secure his medications he was discharged with when the symptoms came about.  He does admit that while he is walking today his foot pain has increased.  Patient was discharged on allopurinol in addition to indomethacin for his foot pain, however he has not started these medicines.  The patient has a history of gout and has been on indomethacin in the past.  Regarding the patient's chest pain, he reports that he felt that the pain was surrounding his heart.  He denied any radiation to his upper left extremity and into left jaw.  He admits that he was taking his home dose of metoprolol, not the dose that he was discharged with.  Patient denies fever, chills, syncope, shortness of breath, abdominal pain, nausea/vomiting, and change in bowel and bladder habits.  Patient denies being ill recently.  Patient was just discharged from the hospital secondary to chest pain.  He had a stress test while he was in the hospital which was negative.  The patient was also supposed to start taking atorvastatin, however he did not take this medication.The patient is reportedly up-to-date the patient is reportedly up-to-date on his immunizations.      ERCourse:  In the emergency department the patient's EKG was reassuring, and his atrial fibrillation was no longer present.  The patient's QTC was around 530.  A CBC and CMP were obtainedLargely unremarkable aside from a  increased MCV around 100, and a total bilirubin of 1.6.  His blood pressure was found to be in the 180s systolic and 130s diastolic and he was given metoprolol 50 mg p.o., which seemed to resolve his high blood pressure.  He was also given allopurinol 100 mg in the ED.    REVIEW OF SYSTEMS:   Ten systems reviewed and were negative except as noted in the HPI.                PAST MEDICAL HISTORY:  Past Medical History:   Diagnosis Date   • Atrial fibrillation (HCC)    • Bronchitis    • CAD (coronary artery disease)    • Gout    • Heart valve disease     mitral valve replacement (bovine)   • Hypertension    • Pacemaker    • Personal history of venous thrombosis and embolism 2009    DVT right leg   • PVC (premature ventricular contraction) 4/13/2019   • Renal disorder     kidney stones   • Type II or unspecified type diabetes mellitus without mention of complication, not stated as uncontrolled     DM type II- diet controlled       PAST SURGICAL HISTORY:  Past Surgical History:   Procedure Laterality Date   • MITRAL VALVE REPLACE  3/8/2017    Procedure: MITRAL VALVE REPLACE-REDO;  Surgeon: Cornelia Wright M.D.;  Location: [facility];  Service:    • KD  3/8/2017    Procedure: KD;  Surgeon: Cornelia Wright M.D.;  Location: [facility];  Service:    • IRRIGATION & DEBRIDEMENT GENERAL  7/20/2009    Performed by MICHEL URBINA at [facility]   • WIDE EXCISION  7/20/2009    Performed by MICHEL URBINA at [facility]   • ANGIOGRAM  7/4/2009    Performed by MICHEL URBINA at [facility]   • CATH REMOVAL  7/4/2009    Performed by MICHEL URBINA at [facility]   • THROMBECTOMY  7/4/2009    Performed by MICHEL URBINA at [facility]   • EMBOLECTOMY  7/4/2009    Performed by MICHEL URBINA at [facility]   • ANGIOGRAM  7/3/2009    Performed by MORGAN WARD at [facility]   • MITRAL VALVE REPLACE   3/14/08    Performed by JEANA MARTELL at [facility]ERY [facility]   • MAZE PROCEDURE  3/14/08    Performed by JEANA MARTELL at SURGERY [facility]   • OTHER CARDIAC SURGERY  2008    mitral valve replacement   • OTHER ABDOMINAL SURGERY      imbulica repair        FAMILY HISTORY:  Family History   Problem Relation Age of Onset   • Heart Disease Neg Hx        SOCIAL HISTORY:   Currently lives in hotel room, lives with no family  Does not smoke  No alcohol use    DIET:   Orders Placed This Encounter   Procedures   • Diet Order Regular     Standing Status:   Standing     Number of Occurrences:   1     Order Specific Question:   Diet:     Answer:   Regular [1]       ALLERGIES:  No Known Allergies    OUTPATIENT MEDICATIONS:    Current Facility-Administered Medications:   •  allopurinol (ZYLOPRIM) tablet 100 mg, 100 mg, Oral, DAILY, Christopher Spence M.D., 100 mg at 04/16/19 1425  •  [START ON 4/17/2019] senna-docusate (PERICOLACE or SENOKOT S) 8.6-50 MG per tablet 2 Tab, 2 Tab, Oral, BID **AND** polyethylene glycol/lytes (MIRALAX) PACKET 1 Packet, 1 Packet, Oral, QDAY PRN **AND** magnesium hydroxide (MILK OF MAGNESIA) suspension 30 mL, 30 mL, Oral, QDAY PRN **AND** bisacodyl (DULCOLAX) suppository 10 mg, 10 mg, Rectal, QDAY PRN, Elliott Hugo D.O.  •  acetaminophen (TYLENOL) tablet 650 mg, 650 mg, Oral, Q6HRS PRN, SIMONE Rawls.O.  •  enalaprilat (VASOTEC) injection 1.25 mg, 1.25 mg, Intravenous, Q6HRS PRN, SIMONE Rawls.O.  •  hydrALAZINE (APRESOLINE) injection 10 mg, 10 mg, Intravenous, Q4HRS PRN, SIMONE Rawls.O.  •  colchicine (COLCRYS) tablet 0.6 mg, 0.6 mg, Oral, DAILY, SIMONE Rawls.O.  •  tamsulosin (FLOMAX) capsule 0.4 mg, 0.4 mg, Oral, Q EVENING, SIMONE Rawls.O.  •  warfarin (COUMADIN) tablet 4-6 mg, 4-6 mg, Oral, DAILY, Elliott Hugo D.O.  •  metoprolol (LOPRESSOR) tablet 25 mg, 25 mg, Oral, QAM, BOOGIE RawlsO.  •  metoprolol (LOPRESSOR) tablet 50 mg, 50 mg, Oral, Nightly, Elliott L [place],  D.O.    Current Outpatient Prescriptions:   •  metoprolol (LOPRESSOR) 25 MG Tab, Take 25 mg by mouth 2 times a day., Disp: , Rfl:   •  indomethacin (INDOCIN) 50 MG Cap, Take 1 Cap by mouth 3 times a day, with meals., Disp: 90 Cap, Rfl: 0  •  acetaminophen (TYLENOL) 500 MG Tab, Take 500-1,000 mg by mouth every 6 hours as needed for Moderate Pain., Disp: , Rfl:   •  warfarin (COUMADIN) 5 MG Tab, Take 4-5 mg by mouth every day. 4 MG on Sun, Tues, Th, Sat 5MG on Mon, Wed, and Fri, Disp: , Rfl:   •  tamsulosin (FLOMAX) 0.4 MG capsule, Take 0.4 mg by mouth every evening., Disp: , Rfl:     PHYSICAL EXAM:  Vitals:    19 1330 19 1400 19 1430 19 1500   BP:       Pulse: 80 80 81 82   Resp:       Temp:       TempSrc:       SpO2: 98% 95% 95% 96%   Weight:       Height:       , Temp (24hrs), Av.7 °C (98 °F), Min:36.7 °C (98 °F), Max:36.7 °C (98 °F)  , Pulse Oximetry: 96 %, O2 Delivery: None (Room Air)    General: Pt resting in NAD, cooperative   Skin:  Pink, warm and dry.  No rashes  HEENT: NC/AT. PERRL. EOMI. MMM. No nasal discharge.  Poor dentition.  Neck:  Supple without lymphadenopathy or rigidity. No JVD   Lungs:  Symmetrical.  CTAB with no W/R/R.  Good air movement   Cardiovascular:  S1/S2 RRR without M/R/G.  Abdomen:  Abdomen is soft NT/ND. +BS. No masses noted.   Extremities:  Full range of motion. No gross deformities noted. 2+ pulses in all extremities. No C/C/E, increased erythema near the proximal  joint of the great toe, tender to palpation, no crepitus with movement of the joint.  Tophaceous deformity on the left second toe on the dorsal aspect, no increased redness, no tenderness to palpation.  Spine:  Straight without vertebral anomalies.  CNS:  Muscle tone is normal. Cranial nerves II-XII grossly intact. 2+ DTRs.         LAB TESTS:   Recent Labs      19   1325   WBC  8.4   RBC  4.34*   HEMOGLOBIN  14.4   HEMATOCRIT  43.5   MCV  100.2*   MCH  33.2*   RDW  52.6*   PLATELETCT   146*   MPV  9.7   NEUTSPOLYS  78.40*   LYMPHOCYTES  9.60*   MONOCYTES  8.00   EOSINOPHILS  3.00   BASOPHILS  0.60     Recent Labs      04/13/19   1740  04/14/19   0050  04/16/19   1325   TROPONINI  <0.01  0.01  0.01     Recent Labs      04/14/19   0050  04/16/19   1325   SODIUM  140  137   POTASSIUM  4.1  4.6   CHLORIDE  109  102   CO2  25  27   BUN  16  20   CREATININE  1.29  1.34   CALCIUM  8.5  9.1   MAGNESIUM  1.9   --    ALBUMIN   --   4.1       CULTURES:   Results     ** No results found for the last 168 hours. **          IMAGES:  None    ASSESSMENT/PLAN:   62yo male admitted to the medical floor for acute gout attack and mild chest pain.    #Gout  Reportedly cannot walk, as this increases his pain  Admits that he has had gout in the past, and normally takes Indocin  Currently on Coumadin and should not be taking nonsteroidal anti-inflammatory drugs  Was given allopurinol in the emergency department, however this can exacerbate an acute gout flare  Has never been on Colcrys  Most recent uric acid level of 9  CBC and CMP largely unremarkable  Patient has had flares in the past and has seen podiatry  Walking on his foot more than usual today    Plan:  Abstain from allopurinol at this time  We will not use Indocin while patient is on Coumadin due to bleeding risk  We will start Colcrys 0.6 mg p.o. daily for acute gout flare  Admit to medical floor  Consider podiatry consult  Consider x-ray if symptoms worsen      #Chest pain  #Atrial Fibrillation  #Sick Sinus Syndrome  Chemical stress on most recent admission w/o evidence of ischemia  Currently asymptomatic at evaluation  On Coumadin and has pacer (last interrogation one yr ago, okay at that time)  Takes 5mg of Coumadin on MWF and 4mg of Coumadin on TTHSatSun  Most recent INR of 3.56 2-3 days ago  Troponin Negative  EKG reassuring w/o evidence of afib or STEMI  BP of 180s/130s in the ED, resolved w/ Metoprolol administration    Plan:  Cont Metoprolol of 25mg po  qam and 50mg po qhs  Cont atorvastatin  Cont Coumadin  CTM sxs  EKG and trops if chest pain returns       #Hypertension  Blood pressures in the 180s/130s upon presentation to the ED  Resolved w/ Metoprolol  Just changed to 25mg po qam and 50mg po qhs    Plan:  Cont Metoprolol as above  CTM sxs      #Hyperglycemia  Not on home medications  Last HgbA1c 6.2 on 2/24/18    Plan:  HgbA1c  Regular diet for now      Core Measures:  Diet: Regular Diet  Lines: PIV  Fluids: None  Abx: None  DVT Ppx: Coumadin  GI Ppx: Diet  PCP: Ivonne Wild MD  CODE STATUS: Full Code      Dispo: Admit for observation on medical floor due to acute gout flare and mild chest pain that has since resolved.         Elliott Hugo DO, MPH (PGY-2)

## 2019-04-16 NOTE — ED PROVIDER NOTES
"ED Provider Note    CHIEF COMPLAINT  Chief Complaint   Patient presents with   • Chest Pain     sudden onset this moring while walking \"pain around my heart\", denies n/v, no n/t   • Foot Pain     right       HPI  Morales Olivier is a 63 y.o. male who presents with complaint of chest discomfort this morning while walking.  He states he was waiting for a bus which never showed up, after walking to the bus station and finding the bus was canceled developed discomfort in his chest.  He states it lasted approximately 1 hour, resolving just prior to arrival at the ER.  Patient was discharged from the hospital yesterday after receiving evaluation for chest pain, had negative stress test at the time.  Patient states he is having difficulty walking, is currently in the midst of gout flare.  He was going to fill prescriptions for Indocin, allopurinol, Lopressor.  He arrives hypertensive as well.  Pain is in his right foot, worse with bearing weight.  He states it is similar to previous gout flares on the other side    REVIEW OF SYSTEMS    Constitutional: No fever  Respiratory: Denies shortness of breath  Cardiac: Chest pain, recent negative cardiac stress test  Gastrointestinal: No abdominal pain  Musculoskeletal: Right foot pain  Neurologic: No headache  Skin: Erythematous change in swelling right foot       All other systems are negative.       PAST MEDICAL HISTORY  Past Medical History:   Diagnosis Date   • Atrial fibrillation (HCC)    • Bronchitis    • CAD (coronary artery disease)    • Gout    • Heart valve disease     mitral valve replacement (bovine)   • Hypertension    • Pacemaker    • Personal history of venous thrombosis and embolism 2009    DVT right leg   • PVC (premature ventricular contraction) 4/13/2019   • Renal disorder     kidney stones   • Type II or unspecified type diabetes mellitus without mention of complication, not stated as uncontrolled     DM type II- diet controlled       FAMILY HISTORY  Family " History   Problem Relation Age of Onset   • Heart Disease Neg Hx        SOCIAL HISTORY  Social History     Social History   • Marital status: Single     Spouse name: N/A   • Number of children: N/A   • Years of education: N/A     Social History Main Topics   • Smoking status: Former Smoker     Quit date: 1/1/1981   • Smokeless tobacco: Never Used   • Alcohol use No   • Drug use: No   • Sexual activity: Not on file     Other Topics Concern   • Not on file     Social History Narrative   • No narrative on file       SURGICAL HISTORY  Past Surgical History:   Procedure Laterality Date   • MITRAL VALVE REPLACE  3/8/2017    Procedure: MITRAL VALVE REPLACE-REDO;  Surgeon: Cornelia Wright M.D.;  Location: SURGERY [facility];  Service:    • KD  3/8/2017    Procedure: KD;  Surgeon: Cornelia Wright M.D.;  Location: SURGERY [facility];  Service:    • IRRIGATION & DEBRIDEMENT GENERAL  7/20/2009    Performed by MICHEL URBINA at SURGERY [facility]   • WIDE EXCISION  7/20/2009    Performed by MICHEL URBINA at SURGERY [facility]   • ANGIOGRAM  7/4/2009    Performed by MICHEL URBINA at SURGERY [facility]   • CATH REMOVAL  7/4/2009    Performed by MICHEL URBINA at SURGERY [facility]   • THROMBECTOMY  7/4/2009    Performed by MICHEL URBINA at SURGERY [facility]   • EMBOLECTOMY  7/4/2009    Performed by MICHEL URBINA at SURGERY [facility]   • ANGIOGRAM  7/3/2009    Performed by MORGAN WARD at SURGERY [facility]   • MITRAL VALVE REPLACE  3/14/08    Performed by CORNELIA WRIGHT at SURGERY [facility]   • MAZE PROCEDURE  3/14/08    Performed by CORNELIA WRIGHT at SURGERY [facility]   • OTHER CARDIAC SURGERY  2008    mitral valve replacement   • OTHER ABDOMINAL SURGERY      imbulica repair        CURRENT MEDICATIONS  Home Medications     Reviewed by Kiah Fang R.N. (Registered Nurse) on 04/16/19 at 1800  Med List Status: Complete   Medication  "Last Dose Status   acetaminophen (TYLENOL) 500 MG Tab 4/16/2019 Active   indomethacin (INDOCIN) 50 MG Cap 4/14/2019 Active   metoprolol (LOPRESSOR) 25 MG Tab 4/16/2019 Active   tamsulosin (FLOMAX) 0.4 MG capsule 4/15/2019 Active   warfarin (COUMADIN) 5 MG Tab 4/15/2019 Active                ALLERGIES  No Known Allergies    PHYSICAL EXAM  VITAL SIGNS: /88   Pulse 76   Temp 36.5 °C (97.7 °F) (Temporal)   Resp 18   Ht 1.778 m (5' 10\")   Wt 114.4 kg (252 lb 3.3 oz)   SpO2 95%   BMI 36.19 kg/m²   Constitutional:  Non-toxic appearance.   HENT: No facial swelling  Eyes: Anicteric, no conjunctivitis.     Cardiovascular: Normal heart rate, Normal rhythm  Pulmonary:  No wheezing, No rales.   Gastrointestinal: Soft, No tenderness, No masses  Skin: Warm, Dry, No cyanosis.  Erythematous change without fluctuance with some swelling right foot over the first MP joint.  No proximal lymphangitis  Neurologic: Speech is clear, follows commands, facial expression is symmetrical.  Psychiatric: Affect normal,  Mood normal.  Patient is calm and cooperative  Musculoskeletal: Evidence of gouty tophi right foot first MP joint.  There is severe tenderness to palpation of the area.  Right ankle nontender    EKG/Labs  Results for orders placed or performed during the hospital encounter of 04/16/19   CBC with Differential   Result Value Ref Range    WBC 8.4 4.8 - 10.8 K/uL    RBC 4.34 (L) 4.70 - 6.10 M/uL    Hemoglobin 14.4 14.0 - 18.0 g/dL    Hematocrit 43.5 42.0 - 52.0 %    .2 (H) 81.4 - 97.8 fL    MCH 33.2 (H) 27.0 - 33.0 pg    MCHC 33.1 (L) 33.7 - 35.3 g/dL    RDW 52.6 (H) 35.9 - 50.0 fL    Platelet Count 146 (L) 164 - 446 K/uL    MPV 9.7 9.0 - 12.9 fL    Neutrophils-Polys 78.40 (H) 44.00 - 72.00 %    Lymphocytes 9.60 (L) 22.00 - 41.00 %    Monocytes 8.00 0.00 - 13.40 %    Eosinophils 3.00 0.00 - 6.90 %    Basophils 0.60 0.00 - 1.80 %    Immature Granulocytes 0.40 0.00 - 0.90 %    Nucleated RBC 0.00 /100 WBC    " Neutrophils (Absolute) 6.59 1.82 - 7.42 K/uL    Lymphs (Absolute) 0.81 (L) 1.00 - 4.80 K/uL    Monos (Absolute) 0.67 0.00 - 0.85 K/uL    Eos (Absolute) 0.25 0.00 - 0.51 K/uL    Baso (Absolute) 0.05 0.00 - 0.12 K/uL    Immature Granulocytes (abs) 0.03 0.00 - 0.11 K/uL    NRBC (Absolute) 0.00 K/uL   Complete Metabolic Panel (CMP)   Result Value Ref Range    Sodium 137 135 - 145 mmol/L    Potassium 4.6 3.6 - 5.5 mmol/L    Chloride 102 96 - 112 mmol/L    Co2 27 20 - 33 mmol/L    Anion Gap 8.0 0.0 - 11.9    Glucose 86 65 - 99 mg/dL    Bun 20 8 - 22 mg/dL    Creatinine 1.34 0.50 - 1.40 mg/dL    Calcium 9.1 8.5 - 10.5 mg/dL    AST(SGOT) 12 12 - 45 U/L    ALT(SGPT) 8 2 - 50 U/L    Alkaline Phosphatase 57 30 - 99 U/L    Total Bilirubin 1.6 (H) 0.1 - 1.5 mg/dL    Albumin 4.1 3.2 - 4.9 g/dL    Total Protein 6.2 6.0 - 8.2 g/dL    Globulin 2.1 1.9 - 3.5 g/dL    A-G Ratio 2.0 g/dL   Troponin   Result Value Ref Range    Troponin I 0.01 0.00 - 0.04 ng/mL   ESTIMATED GFR   Result Value Ref Range    GFR If African American >60 >60 mL/min/1.73 m 2    GFR If Non African American 54 (A) >60 mL/min/1.73 m 2   PROTHROMBIN TIME (INR)   Result Value Ref Range    PT 31.8 (H) 12.0 - 14.6 sec    INR 3.07 (H) 0.87 - 1.13   EKG   Result Value Ref Range    Report       [facility]    Test Date:  2019  Pt Name:    AILYN MCINTOSH                 Department: ER  MRN:        [id number]                      Room:       T205  Gender:     Male                         Technician: [id number]  :        1955                   Requested By:ER TRIAGE PROTOCOL  Order #:    [id number]                    Reading MD: GLO HILL MD    Measurements  Intervals                                Axis  Rate:       80                           P:          0  SC:         179                          QRS:        -46  QRSD:       138                          T:          124  QT:         460  QTc:        531    Interpretive  Statements  VENTRICULAR-PACED RHYTHM  NO FURTHER ANALYSIS ATTEMPTED DUE TO PACED RHYTHM  Compared to ECG 04/13/2019 12:58:57  Atrial fibrillation no longer present  Now paced rhythm  Electronically Signed On 4- 22:06:55 PDT by CHRISTOPHER HILL MD           RADIOLOGY/PROCEDURES  DX-CHEST-PORTABLE (1 VIEW)   Final Result      1.  No acute cardiac or pulmonary abnormality is noted.      2.  Stable cardiomegaly. Prior median sternotomy changes.            COURSE & MEDICAL DECISION MAKING  Pertinent Labs & Imaging studies reviewed. (See chart for details)  Patient with atypical chest pain, recent negative cardiac stress test, with negative EKG and negative troponin.  It is possible this discomfort is being felt in the midst of elevated blood pressure, was started again on Lopressor with some improvement while in the ER.  Patient requested and received initial doses of both Indocin and allopurinol although with the acute flare as well as his taking anticoagulant medication, this may not be the right long-term medicine.  Patient had some pain relief from the medication, he states he is tolerated Indocin in the past despite being on blood thinners.  He states he cannot take steroids secondary to his diabetes.  Colchicine is considered.  No fever, no leukocytosis, redness and swelling unlikely septic arthritis.  Patient is admitted as he has intractable pain, unable to walk, unable to follow-up appropriately secondary to this.  Patient also admitted for control blood pressure, restarting medication.    FINAL IMPRESSION     1. Atypical chest pain    2. Acute gout involving toe of right foot, unspecified cause    3. Hypertension, unspecified type                    Electronically signed by: Christopher Hill, 4/16/2019 10:06 PM

## 2019-04-16 NOTE — ED TRIAGE NOTES
"Chief Complaint   Patient presents with   • Chest Pain     sudden onset this moring while walking \"pain around my heart\", denies n/v, no n/t   • Foot Pain     right     Patient bib EMS from bus station.  Per EMS, patient refused ASA and nitro, reports pain as 3/10.  Patient ambulatory with personal cane.     Patient reports recent hospitalization from [facility], dc'd yesterday - with similar symptoms.      Patient changed into gown, chart up for ERP.    "

## 2019-04-17 LAB
EST. AVERAGE GLUCOSE BLD GHB EST-MCNC: 134 MG/DL
HBA1C MFR BLD: 6.3 % (ref 0–5.6)
INR PPP: 3.12 (ref 0.87–1.13)
PROTHROMBIN TIME: 32.1 SEC (ref 12–14.6)

## 2019-04-17 PROCEDURE — 700111 HCHG RX REV CODE 636 W/ 250 OVERRIDE (IP): Performed by: STUDENT IN AN ORGANIZED HEALTH CARE EDUCATION/TRAINING PROGRAM

## 2019-04-17 PROCEDURE — 700102 HCHG RX REV CODE 250 W/ 637 OVERRIDE(OP): Performed by: STUDENT IN AN ORGANIZED HEALTH CARE EDUCATION/TRAINING PROGRAM

## 2019-04-17 PROCEDURE — 36415 COLL VENOUS BLD VENIPUNCTURE: CPT

## 2019-04-17 PROCEDURE — A9270 NON-COVERED ITEM OR SERVICE: HCPCS | Performed by: STUDENT IN AN ORGANIZED HEALTH CARE EDUCATION/TRAINING PROGRAM

## 2019-04-17 PROCEDURE — A9270 NON-COVERED ITEM OR SERVICE: HCPCS | Performed by: FAMILY MEDICINE

## 2019-04-17 PROCEDURE — 700102 HCHG RX REV CODE 250 W/ 637 OVERRIDE(OP): Performed by: FAMILY MEDICINE

## 2019-04-17 PROCEDURE — 97116 GAIT TRAINING THERAPY: CPT

## 2019-04-17 PROCEDURE — 97161 PT EVAL LOW COMPLEX 20 MIN: CPT

## 2019-04-17 PROCEDURE — G0378 HOSPITAL OBSERVATION PER HR: HCPCS

## 2019-04-17 PROCEDURE — 96375 TX/PRO/DX INJ NEW DRUG ADDON: CPT

## 2019-04-17 PROCEDURE — 96376 TX/PRO/DX INJ SAME DRUG ADON: CPT

## 2019-04-17 PROCEDURE — 97165 OT EVAL LOW COMPLEX 30 MIN: CPT

## 2019-04-17 PROCEDURE — 85610 PROTHROMBIN TIME: CPT

## 2019-04-17 RX ORDER — INDOMETHACIN 50 MG/1
50 CAPSULE ORAL 2 TIMES DAILY WITH MEALS
Status: DISCONTINUED | OUTPATIENT
Start: 2019-04-17 | End: 2019-04-18 | Stop reason: HOSPADM

## 2019-04-17 RX ORDER — COLCHICINE 0.6 MG/1
0.6 TABLET ORAL ONCE
Status: COMPLETED | OUTPATIENT
Start: 2019-04-17 | End: 2019-04-17

## 2019-04-17 RX ORDER — IBUPROFEN 800 MG/1
800 TABLET ORAL ONCE
Status: COMPLETED | OUTPATIENT
Start: 2019-04-17 | End: 2019-04-17

## 2019-04-17 RX ORDER — DIPHENHYDRAMINE HCL 25 MG
25 TABLET ORAL ONCE
Status: COMPLETED | OUTPATIENT
Start: 2019-04-17 | End: 2019-04-17

## 2019-04-17 RX ADMIN — MORPHINE SULFATE 2 MG: 4 INJECTION INTRAVENOUS at 10:44

## 2019-04-17 RX ADMIN — ENALAPRILAT 1.25 MG: 2.5 INJECTION INTRAVENOUS at 08:25

## 2019-04-17 RX ADMIN — MORPHINE SULFATE 2 MG: 4 INJECTION INTRAVENOUS at 20:44

## 2019-04-17 RX ADMIN — WARFARIN SODIUM 5 MG: 5 TABLET ORAL at 19:19

## 2019-04-17 RX ADMIN — COLCHICINE 0.6 MG: 0.6 TABLET, FILM COATED ORAL at 16:45

## 2019-04-17 RX ADMIN — SENNOSIDES, DOCUSATE SODIUM 2 TABLET: 50; 8.6 TABLET, FILM COATED ORAL at 06:00

## 2019-04-17 RX ADMIN — SENNOSIDES, DOCUSATE SODIUM 2 TABLET: 50; 8.6 TABLET, FILM COATED ORAL at 18:00

## 2019-04-17 RX ADMIN — INDOMETHACIN 50 MG: 50 CAPSULE ORAL at 19:19

## 2019-04-17 RX ADMIN — COLCHICINE 0.6 MG: 0.6 TABLET, FILM COATED ORAL at 06:40

## 2019-04-17 RX ADMIN — IBUPROFEN 800 MG: 800 TABLET, FILM COATED ORAL at 14:07

## 2019-04-17 RX ADMIN — METOPROLOL TARTRATE 25 MG: 25 TABLET ORAL at 06:40

## 2019-04-17 RX ADMIN — MORPHINE SULFATE 2 MG: 4 INJECTION INTRAVENOUS at 02:18

## 2019-04-17 RX ADMIN — DIPHENHYDRAMINE HCL 25 MG: 25 TABLET ORAL at 02:18

## 2019-04-17 RX ADMIN — TAMSULOSIN HYDROCHLORIDE 0.4 MG: 0.4 CAPSULE ORAL at 19:19

## 2019-04-17 RX ADMIN — METOPROLOL TARTRATE 50 MG: 25 TABLET ORAL at 20:44

## 2019-04-17 RX ADMIN — MORPHINE SULFATE 2 MG: 4 INJECTION INTRAVENOUS at 06:46

## 2019-04-17 ASSESSMENT — ACTIVITIES OF DAILY LIVING (ADL): TOILETING: INDEPENDENT

## 2019-04-17 ASSESSMENT — GAIT ASSESSMENTS
ASSISTIVE DEVICE: SINGLE POINT CANE
GAIT LEVEL OF ASSIST: SUPERVISED
DISTANCE (FEET): 30
DEVIATION: ANTALGIC;BRADYKINETIC;SHUFFLED GAIT
GAIT LEVEL OF ASSIST: SUPERVISED
DISTANCE (FEET): 200
DEVIATION: ANTALGIC;BRADYKINETIC;DECREASED HEEL STRIKE;DECREASED TOE OFF
ASSISTIVE DEVICE: FRONT WHEEL WALKER

## 2019-04-17 ASSESSMENT — COGNITIVE AND FUNCTIONAL STATUS - GENERAL
CLIMB 3 TO 5 STEPS WITH RAILING: A LITTLE
DAILY ACTIVITIY SCORE: 24
WALKING IN HOSPITAL ROOM: A LITTLE
SUGGESTED CMS G CODE MODIFIER MOBILITY: CJ
MOBILITY SCORE: 22
SUGGESTED CMS G CODE MODIFIER DAILY ACTIVITY: CH

## 2019-04-17 ASSESSMENT — PATIENT HEALTH QUESTIONNAIRE - PHQ9
SUM OF ALL RESPONSES TO PHQ9 QUESTIONS 1 AND 2: 0
2. FEELING DOWN, DEPRESSED, IRRITABLE, OR HOPELESS: NOT AT ALL
1. LITTLE INTEREST OR PLEASURE IN DOING THINGS: NOT AT ALL

## 2019-04-17 NOTE — PROGRESS NOTES
[facility] FAMILY MEDICINE PROGRESS NOTE     Attending:   Ana De La O MD    Resident:   Haydee Perez MD    PATIENT: Morales Olivier; [id number]; 1955    ID: 63 y.o. male admitted for foot pain and chest pain    SUBJECTIVE: No acute events overnight.  Patient complained of foot pain overnight.  States he is unable to walk, and is concerned he will not be able to get up to his apartment because it is up a flight of stairs.  States he will be able to  prescriptions today if needed.  He has no chest pain, no diarrhea.  Mostly concerned about his foot pain and elevated blood pressure.    OBJECTIVE:     Vitals:    04/16/19 1808 04/16/19 2021 04/17/19 0042 04/17/19 0357   BP: 152/91 146/88 150/98 147/91   Pulse:  76 82 80   Resp:  18 16 18   Temp:  36.5 °C (97.7 °F) 36.3 °C (97.4 °F) 36.3 °C (97.3 °F)   TempSrc:  Temporal Temporal Temporal   SpO2:  95% 95% 94%   Weight:       Height:         No intake or output data in the 24 hours ending 04/17/19 0543    PE:   General: No acute distress, resting comfortably in bed.  HEENT: Normocephalic, atraumatic. EOMI. MMM  Cardiovascular: RRR with no M/R/G.  Respiratory: Symmetrical chest. Clear to auscultation bilaterally with no wheezes, rales or rhonchi  Abdomen: soft, non-tender, non-distended, reducible ventral hernia is present, +BS   EXT:  Moves all extremities.  There is a tophaceous abnormality to the right metatarsal phalangeal joint which is significantly erythematous, tender and warm.  There is also a tophaceous abnormality to the left second toe. No edema or cyanosis. 2+ pulses in all extremities  Neuro: non focal, sensation grossly intact    LABS:  Recent Labs      04/16/19   1325   WBC  8.4   RBC  4.34*   HEMOGLOBIN  14.4   HEMATOCRIT  43.5   MCV  100.2*   MCH  33.2*   RDW  52.6*   PLATELETCT  146*   MPV  9.7   NEUTSPOLYS  78.40*   LYMPHOCYTES  9.60*   MONOCYTES  8.00   EOSINOPHILS  3.00   BASOPHILS  0.60     Recent Labs      04/16/19   1325   SODIUM  137    POTASSIUM  4.6   CHLORIDE  102   CO2  27   BUN  20   CREATININE  1.34   CALCIUM  9.1   ALBUMIN  4.1     Estimated GFR/CRCL = Estimated Creatinine Clearance: 70.6 mL/min (by C-G formula based on SCr of 1.34 mg/dL).  Recent Labs      04/14/19   2159  04/15/19   0643  04/15/19   1210  04/16/19   1325   GLUCOSE   --    --    --   86   POCGLUCOSE  130*  86  161*   --      Recent Labs      04/15/19   0055  04/16/19   1325   ASTSGOT   --   12   ALTSGPT   --   8   TBILIRUBIN   --   1.6*   ALKPHOSPHAT   --   57   GLOBULIN   --   2.1   INR  3.59*  3.07*     Recent Labs      04/16/19   1325   TROPONINI  0.01         Recent Labs      04/15/19   0055  04/16/19   1325   INR  3.59*  3.07*         IMAGING:   DX-CHEST-PORTABLE (1 VIEW)   Final Result      1.  No acute cardiac or pulmonary abnormality is noted.      2.  Stable cardiomegaly. Prior median sternotomy changes.          MEDS:  Current Facility-Administered Medications   Medication Last Dose   • allopurinol (ZYLOPRIM) tablet 100 mg Stopped at 04/17/19 0600   • senna-docusate (PERICOLACE or SENOKOT S) 8.6-50 MG per tablet 2 Tab      And   • polyethylene glycol/lytes (MIRALAX) PACKET 1 Packet      And   • magnesium hydroxide (MILK OF MAGNESIA) suspension 30 mL      And   • bisacodyl (DULCOLAX) suppository 10 mg     • acetaminophen (TYLENOL) tablet 650 mg 650 mg at 04/16/19 2057   • enalaprilat (VASOTEC) injection 1.25 mg     • hydrALAZINE (APRESOLINE) injection 10 mg     • colchicine (COLCRYS) tablet 0.6 mg 0.6 mg at 04/16/19 1840   • tamsulosin (FLOMAX) capsule 0.4 mg 0.4 mg at 04/16/19 1840   • metoprolol (LOPRESSOR) tablet 25 mg     • metoprolol (LOPRESSOR) tablet 50 mg 50 mg at 04/16/19 2057   • MD Alert...Warfarin per Physician     • warfarin (COUMADIN) tablet 4 mg 4 mg at 04/16/19 1840   • warfarin (COUMADIN) tablet 5 mg     • morphine (pf) 4 mg/ml injection 2 mg 2 mg at 04/17/19 0218       PROBLEM LIST:  No problems updated.    ASSESSMENT/PLAN:   64yo male admitted  to the medical floor for acute gout attack and mild chest pain.     #Gout  Patient with acute gout flare worsened by increased weightbearing yesterday  Plan:  Abstain from allopurinol at this time  We will continue to avoid Indocin while patient is on Coumadin  Continue Colcrys 0.6 mg p.o. daily for acute gout flare  Receiving morphine 2 mg as needed for breakthrough pain.  Will need oral pain control prior to discharge  Awaiting evaluation by PT/OT  Possible discharge today with outpatient prescription for Colcrys and instructions to follow-up with his primary care provider if patient is able to ambulate        #Chest pain  #Atrial Fibrillation  #Sick Sinus Syndrome  Chemical stress on most recent admission done on 4/15 w/o evidence of ischemia  On Coumadin and has pacer (last interrogation one yr ago, okay at that time)  Takes 5mg of Coumadin on MWF and 4mg of Coumadin on [facility]  Most recent INR of 3.07 on 4/16  Troponin Negative  EKG reassuring w/o evidence of afib or STEMI  Elevated blood pressure in the ED resolved with metoprolol    Plan:  Cont Metoprolol of 25mg po qam and 50mg po qhs  Cont atorvastatin  Cont Coumadin  CTM sxs  EKG and trops if chest pain returns         #Hypertension  Blood pressures in the 180s/130s upon presentation to the ED  Improved this morning to 140s-150s/80s-90s  Just changed to 25mg po qam and 50mg po qhs     Plan:  Cont Metoprolol as above  CTM sxs        #Hyperglycemia  Not on home medications  Last HgbA1c 6.2 on 2/24/18  Repeat hemoglobin A1c 6.3     Plan:  Regular diet for now        Core Measures:  Diet: Regular Diet  Lines: PIV  Fluids: None  Abx: None  DVT Ppx: Coumadin  GI Ppx: Diet  PCP: Ivonne Wild MD  CODE STATUS: Full Code        Dispo: Possible discharge today

## 2019-04-17 NOTE — PROGRESS NOTES
"Assumed care of patient at 1900. Report received from OSWALD Dupont. Initial assessment completed. Patient is A&Ox4 and able to make needs known. C/o RLE pain 7/10 described as \"throbbing\". Pt requesting something other than tylenol. MD paged for orders; new order received and medicated per MAR. POC discussed with pt: PT/OT, pain control. Pt refused socks due to foot pain and refused bed alarm; agrees to call appropriately. No further questions at this time. Fall precautions in place. Bed locked and in the lowest position, call light instructions provided, call light within reach.  "

## 2019-04-17 NOTE — ED NOTES
Morales Olivier transported to [facility] via [employer] with [employer]. All personal belongings in possession including personal cane.  NAD noted.

## 2019-04-17 NOTE — THERAPY
"Physical Therapy Treatment completed.   Bed Mobility:  Supine to Sit: Supervised  Transfers: Sit to Stand: Supervised  Gait: Level Of Assist: Supervised with Single Point Cane       Plan of Care: Patient with no further skilled PT needs in the acute care setting at this time  Discharge Recommendations: Equipment: No Equipment Needed. Post-acute therapy Discharge to home with outpatient or home health for additional skilled therapy services.    Pt seen this am for stair mobility for DC. Pt found up in hallway with OT. Pt ambulated to stairwell and back to room with SPC. Pt with increased antalgic gait with SPC vs FWW but continues to decline FWW. Pt with minimal hip/knee flexion throughout gait cycle on the L LE and increased supination on the R with gait. Pt ascend/descend 1 flight of stairs with 1 rail and SPC with SPV. Attempted to cue pt to ascend leading with \"good\" leg and descend leading with \"bad\" leg, however, pt not open to new education and states he will do it how he wants to. At this time, pt is performing all mobility tasks at SPV level and is refusing use of FWW. Pt does not demonstrate the need for ongoing PT intervention while in the acute care setting. Pt is clear for DC home     See \"Rehab Therapy-Acute\" Patient Summary Report for complete documentation.       "

## 2019-04-17 NOTE — DISCHARGE PLANNING
Care Transition Team Assessment    Spoke with patient at bedside. Anticipate no needs @ present. Anticipates to walk home at D/C.    Information Source  Orientation : Oriented x 4  Information Given By: Patient  Informant's Name: Morales Denson    Readmission Evaluation  Is this a readmission?: No    Interdisciplinary Discharge Planning  Does Admitting Nurse Feel This Could be a Complex Discharge?: No  Primary Care Physician: Junito  Lives with - Patient's Self Care Capacity: Alone and Able to Care For Self  Patient or legal guardian wants to designate a caregiver (see row info): No  Support Systems: Family Member(s)  Housing / Facility: [facility]  Do You Take your Prescribed Medications Regularly: Yes  Reasons Why Not Taking Medications : Financial Reasons  Able to Return to Previous ADL's: Yes  Mobility Issues: No  Prior Services: Home-Independent  Patient Expects to be Discharged to:: Home  Assistance Needed: No  Durable Medical Equipment: Other - Specify (Cane)    Discharge Preparedness  Prior Functional Level: Uses Cane    Functional Assesment  Prior Functional Level: Uses Cane    Finances  Prescription Coverage: Yes    Anticipated Discharge Information  Anticipated discharge disposition: Home  Discharge Address: [address] [place] [address] [address]  Discharge Contact Phone Number: [phone]

## 2019-04-17 NOTE — RESPIRATORY CARE
COPD EDUCATION by COPD CLINICAL EDUCATOR  4/17/2019 at 6:59 AM by Colleen Colin     Patient reviewed by COPD education team. Patient does not have a history or diagnosis or COPD and is a non-smoker, therefore he does not qualify for the COPD program.

## 2019-04-17 NOTE — THERAPY
"Occupational Therapy Evaluation completed.   Functional Status:  Pt pleasant, reports increased gout pain in his right foot.  Pt able to dress in his own clothes with set up.  Pt was supervised for bed mobility & ADL's transfers using a s/p cane. Pt was not very receptive to new learning, appears very set in his ways.  Pt reports some social stressors that appear to be impacting him.  Pt stated he plans to move into a different apt with lower rent in the near future.  Plan of Care: Patient with no further skilled OT needs in the acute care setting at this time  Discharge Recommendations:  Equipment: No Equipment Needed. Post-acute therapy Currently anticipate no further skilled therapy needs once patient is discharged from the inpatient setting.    See \"Rehab Therapy-Acute\" Patient Summary Report for complete documentation.    "

## 2019-04-17 NOTE — PROGRESS NOTES
Assessment done, Pt educated on plan of care. Waiting on dr rounds  Patient resting in bed, no signs of distress,  no complains of pain at this time. Call light within reach,  side rails up, will monitor. Condition stable states if pain more under control will attempt the steps later today will address with dr flores

## 2019-04-17 NOTE — PROGRESS NOTES
Rounded on pt: pt resting, attempting to sleep. R foot pain improved to 4/10. Urinal emptied, needs met.

## 2019-04-18 VITALS
TEMPERATURE: 97.6 F | DIASTOLIC BLOOD PRESSURE: 90 MMHG | HEART RATE: 83 BPM | WEIGHT: 252.21 LBS | OXYGEN SATURATION: 95 % | BODY MASS INDEX: 36.11 KG/M2 | SYSTOLIC BLOOD PRESSURE: 140 MMHG | HEIGHT: 70 IN | RESPIRATION RATE: 18 BRPM

## 2019-04-18 PROCEDURE — A9270 NON-COVERED ITEM OR SERVICE: HCPCS | Performed by: FAMILY MEDICINE

## 2019-04-18 PROCEDURE — 96375 TX/PRO/DX INJ NEW DRUG ADDON: CPT

## 2019-04-18 PROCEDURE — 700111 HCHG RX REV CODE 636 W/ 250 OVERRIDE (IP): Performed by: STUDENT IN AN ORGANIZED HEALTH CARE EDUCATION/TRAINING PROGRAM

## 2019-04-18 PROCEDURE — 700102 HCHG RX REV CODE 250 W/ 637 OVERRIDE(OP): Performed by: FAMILY MEDICINE

## 2019-04-18 PROCEDURE — A9270 NON-COVERED ITEM OR SERVICE: HCPCS | Performed by: STUDENT IN AN ORGANIZED HEALTH CARE EDUCATION/TRAINING PROGRAM

## 2019-04-18 PROCEDURE — G0378 HOSPITAL OBSERVATION PER HR: HCPCS

## 2019-04-18 PROCEDURE — 700102 HCHG RX REV CODE 250 W/ 637 OVERRIDE(OP): Performed by: STUDENT IN AN ORGANIZED HEALTH CARE EDUCATION/TRAINING PROGRAM

## 2019-04-18 PROCEDURE — 700111 HCHG RX REV CODE 636 W/ 250 OVERRIDE (IP): Performed by: FAMILY MEDICINE

## 2019-04-18 PROCEDURE — 96376 TX/PRO/DX INJ SAME DRUG ADON: CPT

## 2019-04-18 RX ORDER — PREDNISONE 20 MG/1
40 TABLET ORAL ONCE
Status: COMPLETED | OUTPATIENT
Start: 2019-04-18 | End: 2019-04-18

## 2019-04-18 RX ORDER — PREDNISONE 20 MG/1
TABLET ORAL
Qty: 8 TAB | Refills: 0 | Status: SHIPPED | OUTPATIENT
Start: 2019-04-18 | End: 2019-07-24

## 2019-04-18 RX ORDER — COLCHICINE 0.6 MG/1
0.6 TABLET ORAL DAILY
Qty: 30 TAB | Refills: 3 | Status: SHIPPED | OUTPATIENT
Start: 2019-04-19 | End: 2019-04-18

## 2019-04-18 RX ORDER — INDOMETHACIN 50 MG/1
50 CAPSULE ORAL
Qty: 90 CAP | Refills: 3 | Status: SHIPPED | OUTPATIENT
Start: 2019-04-18 | End: 2019-04-18

## 2019-04-18 RX ADMIN — WARFARIN SODIUM 4 MG: 4 TABLET ORAL at 17:20

## 2019-04-18 RX ADMIN — COLCHICINE 0.6 MG: 0.6 TABLET, FILM COATED ORAL at 06:27

## 2019-04-18 RX ADMIN — ACETAMINOPHEN 650 MG: 325 TABLET, FILM COATED ORAL at 09:14

## 2019-04-18 RX ADMIN — METOPROLOL TARTRATE 25 MG: 25 TABLET ORAL at 06:25

## 2019-04-18 RX ADMIN — TAMSULOSIN HYDROCHLORIDE 0.4 MG: 0.4 CAPSULE ORAL at 17:20

## 2019-04-18 RX ADMIN — HYDRALAZINE HYDROCHLORIDE 10 MG: 20 INJECTION INTRAMUSCULAR; INTRAVENOUS at 14:30

## 2019-04-18 RX ADMIN — ENALAPRILAT 1.25 MG: 2.5 INJECTION INTRAVENOUS at 13:12

## 2019-04-18 RX ADMIN — SENNOSIDES, DOCUSATE SODIUM 2 TABLET: 50; 8.6 TABLET, FILM COATED ORAL at 06:28

## 2019-04-18 RX ADMIN — PREDNISONE 40 MG: 20 TABLET ORAL at 17:20

## 2019-04-18 RX ADMIN — INDOMETHACIN 50 MG: 50 CAPSULE ORAL at 06:27

## 2019-04-18 NOTE — PROGRESS NOTES
[facility] PROGRESS NOTE     Attending: Chad Myers M.D.  Senior Resident: Deepa Govea MD  PATIENT: Morales Olivier; [id number]; 1955    ID: 63 y.o. male admitted for right foot pain likely secondary to acute gouty flare    Subjective: Patient with continued complaints of pain to right foot. He reports improvement with the addition of Indomethacin yesterday. Last dosage of Morphine administered yesterday evening at 2044. Evaluated by PT yesterday who cleared patient for discharge home.     OBJECTIVE:     Vitals:    04/17/19 2103 04/18/19 0049 04/18/19 0618 04/18/19 0830   BP: 142/95 151/96 136/100 152/102   Pulse: 77 79 88 80   Resp: 18 16 17 18   Temp: 36.7 °C (98 °F) 36.6 °C (97.8 °F) 37.1 °C (98.7 °F) 36.6 °C (97.8 °F)   TempSrc: Temporal Temporal Temporal Temporal   SpO2: 95% 97% 97% 99%   Weight:       Height:           Intake/Output Summary (Last 24 hours) at 04/18/19 0837  Last data filed at 04/18/19 0049   Gross per 24 hour   Intake                0 ml   Output              250 ml   Net             -250 ml     PE:  Physical Exam   Constitutional: He is oriented to person, place, and time and well-developed, well-nourished, and in no distress.   HENT:   Head: Normocephalic and atraumatic.   Eyes: Pupils are equal, round, and reactive to light. Conjunctivae are normal.   Neck: Normal range of motion. Neck supple.   Cardiovascular: Normal rate and regular rhythm.    Pulmonary/Chest: Effort normal and breath sounds normal.   Abdominal: Soft. Bowel sounds are normal.   Reducible ventral hernia   Musculoskeletal:   BIANCHI x 4, tophaceous abnormality to the right metatarsal phalangeal joint which is significantly erythematous, tender and warm. There is also a tophaceous abnormality to the left second toe.   Neurological: He is alert and oriented to person, place, and time.   Psychiatric: Affect normal.     LABS:  Recent Labs      04/16/19   1325   WBC  8.4   RBC  4.34*   HEMOGLOBIN  14.4   HEMATOCRIT   43.5   MCV  100.2*   MCH  33.2*   RDW  52.6*   PLATELETCT  146*   MPV  9.7   NEUTSPOLYS  78.40*   LYMPHOCYTES  9.60*   MONOCYTES  8.00   EOSINOPHILS  3.00   BASOPHILS  0.60     Recent Labs      04/16/19   1325   SODIUM  137   POTASSIUM  4.6   CHLORIDE  102   CO2  27   BUN  20   CREATININE  1.34   CALCIUM  9.1   ALBUMIN  4.1     Estimated GFR/CRCL = Estimated Creatinine Clearance: 70.6 mL/min (by C-G formula based on SCr of 1.34 mg/dL).  Recent Labs      04/15/19   1210  04/16/19   1325   GLUCOSE   --   86   POCGLUCOSE  161*   --      Recent Labs      04/16/19   1325  04/17/19   1315   ASTSGOT  12   --    ALTSGPT  8   --    TBILIRUBIN  1.6*   --    ALKPHOSPHAT  57   --    GLOBULIN  2.1   --    INR  3.07*  3.12*     Recent Labs      04/16/19   1325   TROPONINI  0.01         Recent Labs      04/16/19   1325  04/17/19   1315   INR  3.07*  3.12*       MICROBIOLOGY:   Blood Culture   Date Value Ref Range Status   09/13/2009 No growth after 5 days of incubation.  Final     Blood Culture Hold   Date Value Ref Range Status   05/17/2015 Collected  Final        IMAGING:   DX-CHEST-PORTABLE (1 VIEW)   Final Result      1.  No acute cardiac or pulmonary abnormality is noted.      2.  Stable cardiomegaly. Prior median sternotomy changes.          MEDS:  Current Facility-Administered Medications   Medication Last Dose   • indomethacin (INDOCIN) capsule 50 mg 50 mg at 04/18/19 0627   • senna-docusate (PERICOLACE or SENOKOT S) 8.6-50 MG per tablet 2 Tab 2 Tab at 04/18/19 0628    And   • polyethylene glycol/lytes (MIRALAX) PACKET 1 Packet      And   • magnesium hydroxide (MILK OF MAGNESIA) suspension 30 mL      And   • bisacodyl (DULCOLAX) suppository 10 mg     • acetaminophen (TYLENOL) tablet 650 mg 650 mg at 04/16/19 2057   • enalaprilat (VASOTEC) injection 1.25 mg 1.25 mg at 04/17/19 0825   • hydrALAZINE (APRESOLINE) injection 10 mg     • colchicine (COLCRYS) tablet 0.6 mg 0.6 mg at 04/18/19 0627   • tamsulosin (FLOMAX) capsule  0.4 mg 0.4 mg at 04/17/19 1919   • metoprolol (LOPRESSOR) tablet 25 mg 25 mg at 04/18/19 0625   • metoprolol (LOPRESSOR) tablet 50 mg 50 mg at 04/17/19 2044   • MD Alert...Warfarin per Physician     • warfarin (COUMADIN) tablet 4 mg 4 mg at 04/16/19 1840   • warfarin (COUMADIN) tablet 5 mg 5 mg at 04/17/19 1919   • morphine (pf) 4 mg/ml injection 2 mg 2 mg at 04/17/19 2044       PROBLEM LIST:  No problems updated.    ASSESSMENT/PLAN: 63 y.o. male admitted for right foot pain likely secondary to acute gouty flare    #Gout  Patient with acute gout flare worsened by increased weight bearing upon day of admission   Plan:  Abstain from allopurinol at this time  Resume Indomethacin, discussed with patient this could affect his INR, he voiced understanding  Continue Colcrys 0.6 mg p.o. daily for acute gout flare, treated with an additional dose of Colchicine yesterday for full initial recommended dose of 1.2 mg.   Receiving morphine 2 mg as needed for breakthrough pain.  Will need oral pain control prior to discharge  Evaluated by PT/OT who cleared pt for discharge  Possible discharge today with outpatient prescription for Colcrys and Indomethacin with instructions to follow-up with his primary care provider if patient is able to ambulate     #Hx of Chest pain  #Atrial Fibrillation  #Sick Sinus Syndrome  Chemical stress on most recent admission done on 4/15 w/o evidence of ischemia  On Coumadin and has pacer (last interrogation one yr ago, okay at that time)  Takes 5mg of Coumadin on MWF and 4mg of Coumadin on TTHSatSun  Most recent INR of 3.07 on 4/16  Troponin Negative  EKG reassuring w/o evidence of afib or STEMI  Elevated blood pressure in the ED resolved with metoprolol     Plan:  Cont Metoprolol of 25mg po qam and 50mg po qhs  Cont atorvastatin  Cont Coumadin  CTM sxs  EKG and trops if chest pain returns      #Hypertension  Blood pressures in the 180s/130s upon presentation to the ED  Improved this morning to  140s-150s/80s-90s  Just changed to 25mg po qam and 50mg po qhs     Plan:  Cont Metoprolol as above  CTM sxs     #Hyperglycemia  Not on home medications  Last HgbA1c 6.2 on 2/24/18  Repeat hemoglobin A1c 6.3     Plan:  Regular diet for now     Core Measures:  Diet: Regular Diet  Lines: PIV  Fluids: None  Abx: None  DVT Ppx: Coumadin  GI Ppx: Diet  PCP: Ivonne Wild MD  CODE STATUS: Full Code    Deepa Govea MD  [facility]   [phone]

## 2019-04-18 NOTE — PROGRESS NOTES
Up to rr to void. Pt declined cane. Pt ambulated with wide based gait, slightly unsteady, and SBA. C/o 7/10 pain with ambulation, requesting medication. Medicated per MAR.

## 2019-04-18 NOTE — PROGRESS NOTES
Pt requesting pain medication for R foot pain 7/10. Medselect empty. Pharmacy messaged. Pt requested indomethacin now; given with cereal, milk, and gladys crackers. Needs met.

## 2019-04-18 NOTE — PROGRESS NOTES
"Assumed care of patient at 1900. Bedside report received from OSWALD Gutierrez. Initial assessment completed. Patient is A&Ox4 and able to make needs known. Patient states that he is doing \"better;\" R foot pain 3/10, states that it's \"tolerable.\" VSS on RA; resting in bed watching TV. POC discussed with pt: ambulation encouraged and pt educated to call for rr instead of using urinal, pain control. No further questions at this time. Fall precautions in place. Bed locked and in the lowest position, call light instructions provided, call light within reach.  "

## 2019-04-19 NOTE — DISCHARGE INSTRUCTIONS
Discharge Instructions    Discharged to home by car with self. Discharged via wheelchair, hospital escort: Yes.  Special equipment needed: Not Applicable    Be sure to schedule a follow-up appointment with your primary care doctor or any specialists as instructed.     Discharge Plan:   Diet Plan: Discussed  Activity Level: Discussed  Confirmed Follow up Appointment: Patient to Call and Schedule Appointment  Confirmed Symptoms Management: Discussed  Medication Reconciliation Updated: Yes  Influenza Vaccine Indication: Patient Refuses    I understand that a diet low in cholesterol, fat, and sodium is recommended for good health. Unless I have been given specific instructions below for another diet, I accept this instruction as my diet prescription.   Other diet: reg    Special Instructions: None    · Is patient discharged on Warfarin / Coumadin?   Yes    You are receiving the drug warfarin. Please understand the importance of monitoring warfarin with scheduled PT/INR blood draws.  Follow-up with a call to your personal Doctor's office in 3 days to schedule a PT/INR. .    IMPORTANT: HOW TO USE THIS INFORMATION:  This is a summary and does NOT have all possible information about this product. This information does not assure that this product is safe, effective, or appropriate for you. This information is not individual medical advice and does not substitute for the advice of your health care professional. Always ask your health care professional for complete information about this product and your specific health needs.      WARFARIN - ORAL (WARF-uh-rin)      COMMON BRAND NAME(S): Coumadin      WARNING:  Warfarin can cause very serious (possibly fatal) bleeding. This is more likely to occur when you first start taking this medication or if you take too much warfarin. To decrease your risk for bleeding, your doctor or other health care provider will monitor you closely and check your lab results (INR test) to make sure  "you are not taking too much warfarin. Keep all medical and laboratory appointments. Tell your doctor right away if you notice any signs of serious bleeding. See also Side Effects section.      USES:  This medication is used to treat blood clots (such as in deep vein thrombosis-DVT or pulmonary embolus-PE) and/or to prevent new clots from forming in your body. Preventing harmful blood clots helps to reduce the risk of a stroke or heart attack. Conditions that increase your risk of developing blood clots include a certain type of irregular heart rhythm (atrial fibrillation), heart valve replacement, recent heart attack, and certain surgeries (such as hip/knee replacement). Warfarin is commonly called a \"blood thinner,\" but the more correct term is \"anticoagulant.\" It helps to keep blood flowing smoothly in your body by decreasing the amount of certain substances (clotting proteins) in your blood.      HOW TO USE:  Read the Medication Guide provided by your pharmacist before you start taking warfarin and each time you get a refill. If you have any questions, ask your doctor or pharmacist. Take this medication by mouth with or without food as directed by your doctor or other health care professional, usually once a day. It is very important to take it exactly as directed. Do not increase the dose, take it more frequently, or stop using it unless directed by your doctor. Dosage is based on your medical condition, laboratory tests (such as INR), and response to treatment. Your doctor or other health care provider will monitor you closely while you are taking this medication to determine the right dose for you. Use this medication regularly to get the most benefit from it. To help you remember, take it at the same time each day. It is important to eat a balanced, consistent diet while taking warfarin. Some foods can affect how warfarin works in your body and may affect your treatment and dose. Avoid sudden large increases " or decreases in your intake of foods high in vitamin K (such as broccoli, cauliflower, cabbage, brussels sprouts, kale, spinach, and other green leafy vegetables, liver, green tea, certain vitamin supplements). If you are trying to lose weight, check with your doctor before you try to go on a diet. Cranberry products may also affect how your warfarin works. Limit the amount of cranberry juice (16 ounces/480 milliliters a day) or other cranberry products you may drink or eat.      SIDE EFFECTS:  Nausea, loss of appetite, or stomach/abdominal pain may occur. If any of these effects persist or worsen, tell your doctor or pharmacist promptly. Remember that your doctor has prescribed this medication because he or she has judged that the benefit to you is greater than the risk of side effects. Many people using this medication do not have serious side effects. This medication can cause serious bleeding if it affects your blood clotting proteins too much (shown by unusually high INR lab results). Even if your doctor stops your medication, this risk of bleeding can continue for up to a week. Tell your doctor right away if you have any signs of serious bleeding, including: unusual pain/swelling/discomfort, unusual/easy bruising, prolonged bleeding from cuts or gums, persistent/frequent nosebleeds, unusually heavy/prolonged menstrual flow, pink/dark urine, coughing up blood, vomit that is bloody or looks like coffee grounds, severe headache, dizziness/fainting, unusual or persistent tiredness/weakness, bloody/black/tarry stools, chest pain, shortness of breath, difficulty swallowing. Tell your doctor right away if any of these unlikely but serious side effects occur: persistent nausea/vomiting, severe stomach/abdominal pain, yellowing eyes/skin. This drug rarely has caused very serious (possibly fatal) problems if its effects lead to small blood clots (usually at the beginning of treatment). This can lead to severe  skin/tissue damage that may require surgery or amputation if left untreated. Patients with certain blood conditions (protein C or S deficiency) may be at greater risk. Get medical help right away if any of these rare but serious side effects occur: painful/red/purplish patches on the skin (such as on the toe, breast, abdomen), change in the amount of urine, vision changes, confusion, slurred speech, weakness on one side of the body. A very serious allergic reaction to this drug is rare. However, get medical help right away if you notice any symptoms of a serious allergic reaction, including: rash, itching/swelling (especially of the face/tongue/throat), severe dizziness, trouble breathing. This is not a complete list of possible side effects. If you notice other effects not listed above, contact your doctor or pharmacist. In the [place] - Call your doctor for medical advice about side effects. You may report side effects to FDA at 3-651-JJF-5961. In [place] - Call your doctor for medical advice about side effects. You may report side effects to [facility] [place] at [phone].      PRECAUTIONS:  Before taking warfarin, tell your doctor or pharmacist if you are allergic to it; or if you have any other allergies. This product may contain inactive ingredients, which can cause allergic reactions or other problems. Talk to your pharmacist for more details. Before using this medication, tell your doctor or pharmacist your medical history, especially of: blood disorders (such as anemia, hemophilia), bleeding problems (such as bleeding of the stomach/intestines, bleeding in the brain), blood vessel disorders (such as aneurysms), recent major injury/surgery, liver disease, alcohol use, mental/mood disorders (including memory problems), frequent falls/injuries. It is important that all your doctors and dentists know that you take warfarin. Before having surgery or any medical/dental procedures, tell your doctor or dentist that you  are taking this medication and about all the products you use (including prescription drugs, nonprescription drugs, and herbal products). Avoid getting injections into the muscles. If you must have an injection into a muscle (for example, a flu shot), it should be given in the arm. This way, it will be easier to check for bleeding and/or apply pressure bandages. This medication may cause stomach bleeding. Daily use of alcohol while using this medicine will increase your risk for stomach bleeding and may also affect how this medication works. Limit or avoid alcoholic beverages. If you have not been eating well, if you have an illness or infection that causes fever, vomiting, or diarrhea for more than 2 days, or if you start using any antibiotic medications, contact your doctor or pharmacist immediately because these conditions can affect how warfarin works. This medication can cause heavy bleeding. To lower the chance of getting cut, bruised, or injured, use great caution with sharp objects like safety razors and nail cutters. Use an electric razor when shaving and a soft toothbrush when brushing your teeth. Avoid activities such as contact sports. If you fall or injure yourself, especially if you hit your head, call your doctor immediately. Your doctor may need to check you. The Food & Drug Administration has stated that generic warfarin products are interchangeable. However, consult your doctor or pharmacist before switching warfarin products. Be careful not to take more than one medication that contains warfarin unless specifically directed by the doctor or health care provider who is monitoring your warfarin treatment. Older adults may be at greater risk for bleeding while using this drug. This medication is not recommended for use during pregnancy because of serious (possibly fatal) harm to an unborn baby. Discuss the use of reliable forms of birth control with your doctor. If you become pregnant or think you  "may be pregnant, tell your doctor immediately. If you are planning pregnancy, discuss a plan for managing your condition with your doctor before you become pregnant. Your doctor may switch the type of medication you use during pregnancy. Very small amounts of this medication may pass into breast milk but is unlikely to harm a nursing infant. Consult your doctor before breast-feeding.      DRUG INTERACTIONS:  Drug interactions may change how your medications work or increase your risk for serious side effects. This document does not contain all possible drug interactions. Keep a list of all the products you use (including prescription/nonprescription drugs and herbal products) and share it with your doctor and pharmacist. Do not start, stop, or change the dosage of any medicines without your doctor's approval. Warfarin interacts with many prescription, nonprescription, vitamin, and herbal products. This includes medications that are applied to the skin or inside the vagina or rectum. The interactions with warfarin usually result in an increase or decrease in the \"blood-thinning\" (anticoagulant) effect. Your doctor or other health care professional should closely monitor you to prevent serious bleeding or clotting problems. While taking warfarin, it is very important to tell your doctor or pharmacist of any changes in medications, vitamins, or herbal products that you are taking. Some products that may interact with this drug include: capecitabine, imatinib, mifepristone. Aspirin, aspirin-like drugs (salicylates), and nonsteroidal anti-inflammatory drugs (NSAIDs such as ibuprofen, naproxen, celecoxib) may have effects similar to warfarin. These drugs may increase the risk of bleeding problems if taken during treatment with warfarin. Carefully check all prescription/nonprescription product labels (including drugs applied to the skin such as pain-relieving creams) since the products may contain NSAIDs or salicylates. " Talk to your doctor about using a different medication (such as acetaminophen) to treat pain/fever. Low-dose aspirin and related drugs (such as clopidogrel, ticlopidine) should be continued if prescribed by your doctor for specific medical reasons such as heart attack or stroke prevention. Consult your doctor or pharmacist for more details. Many herbal products interact with warfarin. Tell your doctor before taking any herbal products, especially bromelains, coenzyme Q10, cranberry, danshen, dong quai, fenugreek, garlic, ginkgo biloba, ginseng, and Thong's wort, among others. This medication may interfere with a certain laboratory test to measure theophylline levels, possibly causing false test results. Make sure laboratory personnel and all your doctors know you use this drug.      OVERDOSE:  If overdose is suspected, contact a poison control center or emergency room immediately. [place] residents can call the [employer] Poison Hotline at 1-944.577.2494. [place] residents can call a provincial poison control center. Symptoms of overdose may include: bloody/black/tarry stools, pink/dark urine, unusual/prolonged bleeding.      NOTES:  Do not share this medication with others. Laboratory and/or medical tests (such as INR, complete blood count) must be performed periodically to monitor your progress or check for side effects. Consult your doctor for more details.      MISSED DOSE:  For the best possible benefit, do not miss any doses. If you do miss a dose and remember on the same day, take it as soon as you remember. If you remember on the next day, skip the missed dose and resume your usual dosing schedule. Do not double the dose to catch up because this could increase your risk for bleeding. Keep a record of missed doses to give to your doctor or pharmacist. Contact your doctor or pharmacist if you miss 2 or more doses in a row.      STORAGE:  Store at room temperature away from light and moisture. Do not store in the  bathroom. Keep all medications away from children and pets. Do not flush medications down the toilet or pour them into a drain unless instructed to do so. Properly discard this product when it is  or no longer needed. Consult your pharmacist or local waste disposal company for more details about how to safely discard your product.      MEDICAL ALERT:  Your condition and medication can cause complications in a medical emergency. For information about enrolling in MedicAlert, call 1-896.326.2652 ([place]) or 1-472.658.9352 ([place]).      Information last revised 2010 Copyright(c)  [employer]             Depression / Suicide Risk    As you are discharged from this [facility] facility, it is important to learn how to keep safe from harming yourself.    Recognize the warning signs:  · Abrupt changes in personality, positive or negative- including increase in energy   · Giving away possessions  · Change in eating patterns- significant weight changes-  positive or negative  · Change in sleeping patterns- unable to sleep or sleeping all the time   · Unwillingness or inability to communicate  · Depression  · Unusual sadness, discouragement and loneliness  · Talk of wanting to die  · Neglect of personal appearance   · Rebelliousness- reckless behavior  · Withdrawal from people/activities they love  · Confusion- inability to concentrate     If you or a loved one observes any of these behaviors or has concerns about self-harm, here's what you can do:  · Talk about it- your feelings and reasons for harming yourself  · Remove any means that you might use to hurt yourself (examples: pills, rope, extension cords, firearm)  · Get professional help from the community (Mental Health, Substance Abuse, psychological counseling)  · Do not be alone:Call your Safe Contact- someone whom you trust who will be there for you.  · Call your local CRISIS HOTLINE 976-4032 or 677-285-0771  · Call your local Children's  Mobile Crisis Response Team [place] (280) 805-5461 or www.[employer]  · Call the toll free National Suicide Prevention Hotlines   · National Suicide Prevention Lifeline 579-900-IVDH (3977)  · National Hope Line Network 800-SUICIDE (218-9598)

## 2019-06-14 ENCOUNTER — HOSPITAL ENCOUNTER (OUTPATIENT)
Dept: LAB | Facility: MEDICAL CENTER | Age: 64
End: 2019-06-14
Attending: FAMILY MEDICINE
Payer: MEDICARE

## 2019-06-14 LAB
INR PPP: 2.01 (ref 0.87–1.13)
PROTHROMBIN TIME: 23.5 SEC (ref 12–14.6)

## 2019-06-14 PROCEDURE — 85610 PROTHROMBIN TIME: CPT

## 2019-06-14 PROCEDURE — 36415 COLL VENOUS BLD VENIPUNCTURE: CPT

## 2019-07-16 ENCOUNTER — TELEPHONE (OUTPATIENT)
Dept: VASCULAR LAB | Facility: MEDICAL CENTER | Age: 64
End: 2019-07-16

## 2019-07-16 NOTE — TELEPHONE ENCOUNTER
[facility]    Received referral for anticoagulation, please see media.  No VM, unable to leave message.    PCP: [facility]  INS: medicare  Preferred Location: [facility]    Follow up again in 2-3 days.     Adolph Arnett, PharmD

## 2019-07-18 ENCOUNTER — TELEPHONE (OUTPATIENT)
Dept: VASCULAR LAB | Facility: MEDICAL CENTER | Age: 64
End: 2019-07-18

## 2019-07-19 NOTE — TELEPHONE ENCOUNTER
[facility]    Unable to reach pt, VM has not been set up. INR goal of 2.5-3.5 per referral.    PCP: [facility]  INS: medicare  Preferred Location: [facility]    Adolph Arnett, CbD

## 2019-07-24 ENCOUNTER — TELEPHONE (OUTPATIENT)
Dept: VASCULAR LAB | Facility: MEDICAL CENTER | Age: 64
End: 2019-07-24

## 2019-07-24 ENCOUNTER — ANTICOAGULATION VISIT (OUTPATIENT)
Dept: VASCULAR LAB | Facility: MEDICAL CENTER | Age: 64
End: 2019-07-24
Attending: INTERNAL MEDICINE
Payer: MEDICARE

## 2019-07-24 VITALS — HEART RATE: 111 BPM | DIASTOLIC BLOOD PRESSURE: 84 MMHG | SYSTOLIC BLOOD PRESSURE: 112 MMHG

## 2019-07-24 DIAGNOSIS — Z95.2 H/O MITRAL VALVE REPLACEMENT: ICD-10-CM

## 2019-07-24 DIAGNOSIS — I82.90 DEEP VEIN THROMBOSIS (DVT) OF NON-EXTREMITY VEIN, UNSPECIFIED CHRONICITY: ICD-10-CM

## 2019-07-24 LAB — INR PPP: 2.2 (ref 2–3.5)

## 2019-07-24 PROCEDURE — 99213 OFFICE O/P EST LOW 20 MIN: CPT | Performed by: NURSE PRACTITIONER

## 2019-07-24 PROCEDURE — 85610 PROTHROMBIN TIME: CPT

## 2019-07-24 NOTE — PROGRESS NOTES
Anticoagulation Summary  As of 2019    INR goal:   2.5-3.5   TTR:   --   INR used for dosin.20! (2019)   Warfarin maintenance plan:   5 mg (5 mg x 1) every Mon, Wed, Fri; 4 mg (2 mg x 2) all other days   Weekly warfarin total:   31 mg   Plan last modified:   SHARMILA Bob (2019)   Next INR check:   2019   Target end date:   Indefinite    Indications    H/O mitral valve replacement [Z95.2]  A-fib (HCC) (Resolved) [I48.91]             Anticoagulation Episode Summary     INR check location:       Preferred lab:       Send INR reminders to:       Comments:         Anticoagulation Care Providers     Provider Role Specialty Phone number    Sumanth Yancey M.D. Referring Cardiology [phone]    [facility] Responsible  [phone]        Anticoagulation Patient Findings      HPI:  Morales Olivier seen in clinic today for follow up on anticoagulation therapy in the presence of AF, bioprosthetic mitral valve repair. Pt is new to our clinic but not new to warfarin. He was previously managed by his doctor at [facility].  CHADSVASC score = 3 (htn, dm, hx of VTE). Pt denies ever having a stroke or TIA in the past. Reports a DVT x 1 in .   HAS-BLED = 1 (age)  Hx of mitral valve replacement x 2 - most recent in 2017. He hasn't had f/u with cardiology for a couple of years. Has been on warfarin for several years. States his INR goal is 2.5-3.5 which is also in his referral (media tab).  Reviewed his medications.  He doesn't currently have a PCP but trying to get established with [facility] clinic.  Reviewed his medications.   Denies any recent medication or diet changes.   No current symptoms of bleeding or thrombosis reported.    He takes warfarin 5 mg --, 4 mg AODs. No missed doses. Uses 5 mg & 2 mg tablets.    A/P:   INR subtherapeutic.   CHADS 2 score = 2. Will increase tonight then continue current regimen.   BP recorded in vitals.  Referral placed to cardiology.    Follow  up appointment in 1 week(s).    Next Appointment: Thursday, August 1, 2019 at 11:00 am.    Yelena LOYA

## 2019-07-25 PROBLEM — I82.409 DEEP VEIN THROMBOSIS (HCC): Status: ACTIVE | Noted: 2019-07-25

## 2019-07-25 LAB — INR BLD: 2.2 (ref 0.9–1.2)

## 2019-07-25 NOTE — TELEPHONE ENCOUNTER
Initial anticoagulation clinic note and most recent pcp note reviewed.     Pending further recommendations, we will continue with indefinite anticoagulation as directed by pcp for h/o mitral valve replacement, afib, and dvt.    We will defer all further cv care, aside from day to day management of anticoagulation to cardiology and/or pcp    Michael Bloch, MD  Anticoagulation Clinic    Cc:  KIRTI Wild

## 2019-08-01 ENCOUNTER — ANTICOAGULATION VISIT (OUTPATIENT)
Dept: VASCULAR LAB | Facility: MEDICAL CENTER | Age: 64
End: 2019-08-01
Attending: INTERNAL MEDICINE
Payer: MEDICARE

## 2019-08-01 VITALS — SYSTOLIC BLOOD PRESSURE: 143 MMHG | DIASTOLIC BLOOD PRESSURE: 99 MMHG | HEART RATE: 85 BPM

## 2019-08-01 DIAGNOSIS — I82.90 DEEP VEIN THROMBOSIS (DVT) OF NON-EXTREMITY VEIN, UNSPECIFIED CHRONICITY: ICD-10-CM

## 2019-08-01 DIAGNOSIS — Z95.2 H/O MITRAL VALVE REPLACEMENT: ICD-10-CM

## 2019-08-01 LAB — INR PPP: 2.4 (ref 2–3.5)

## 2019-08-01 PROCEDURE — 99212 OFFICE O/P EST SF 10 MIN: CPT | Performed by: NURSE PRACTITIONER

## 2019-08-01 PROCEDURE — 85610 PROTHROMBIN TIME: CPT

## 2019-08-01 RX ORDER — ALLOPURINOL 100 MG/1
100 TABLET ORAL DAILY
Qty: 30 TAB | Refills: 0 | Status: SHIPPED | OUTPATIENT
Start: 2019-08-01 | End: 2019-08-29

## 2019-08-01 RX ORDER — WARFARIN SODIUM 5 MG/1
5 TABLET ORAL DAILY
Qty: 30 TAB | Refills: 3 | Status: SHIPPED | OUTPATIENT
Start: 2019-08-01 | End: 2019-08-29 | Stop reason: SDUPTHER

## 2019-08-01 NOTE — PROGRESS NOTES
Anticoagulation Summary  As of 2019    INR goal:   2.5-3.5   TTR:   --   INR used for dosin.40! (2019)   Warfarin maintenance plan:   5 mg (5 mg x 1) every day   Weekly warfarin total:   35 mg   Plan last modified:   SHARMILA Bob (2019)   Next INR check:      Target end date:   Indefinite    Indications    H/O mitral valve replacement [Z95.2]  A-fib (HCC) (Resolved) [I48.91]  Deep vein thrombosis (HCC) [I82.409] [I82.409]             Anticoagulation Episode Summary     INR check location:       Preferred lab:       Send INR reminders to:       Comments:         Anticoagulation Care Providers     Provider Role Specialty Phone number    Sumanth Yancey M.D. Referring Cardiology [phone]    [facility] Responsible  [phone]        Anticoagulation Patient Findings      HPI:  Morales Olivier seen in clinic today for follow up on anticoagulation therapy in the presence of MVR, AF, DVT hx.   Denies any changes to current medical/health status since last appointment.   Denies any medication or diet changes.   No current symptoms of bleeding or thrombosis reported. Pt having increased foot pain from his gout. He ran out of allopurinol and metoprolol recently. He is having a hard time establishing with the [facility] clinic. He hasn't tried [facility] but has their phone number.  Confirmed dose. No missed doses.    A/P:   INR subtherapeutic despite loading dose.   CHADS 2 score = 2. Will increase regimen.   BP recorded in vitals.    Will send rx for 1 month supply of metoprolol + allopurinol to [facility] for pt as a courtesy. He knows he needs to see a PCP for further refills.    Follow up appointment in 1 week(s).    Next Appointment: 2019 at 10:45 am.    Yelena LOYA

## 2019-08-05 DIAGNOSIS — Z79.01 CHRONIC ANTICOAGULATION: ICD-10-CM

## 2019-08-05 LAB — INR BLD: 2.4 (ref 0.9–1.2)

## 2019-08-07 ENCOUNTER — ANTICOAGULATION VISIT (OUTPATIENT)
Dept: VASCULAR LAB | Facility: MEDICAL CENTER | Age: 64
End: 2019-08-07
Attending: INTERNAL MEDICINE
Payer: MEDICARE

## 2019-08-07 VITALS — DIASTOLIC BLOOD PRESSURE: 89 MMHG | HEART RATE: 87 BPM | SYSTOLIC BLOOD PRESSURE: 126 MMHG

## 2019-08-07 DIAGNOSIS — I82.90 DEEP VEIN THROMBOSIS (DVT) OF NON-EXTREMITY VEIN, UNSPECIFIED CHRONICITY: ICD-10-CM

## 2019-08-07 DIAGNOSIS — Z95.2 H/O MITRAL VALVE REPLACEMENT: ICD-10-CM

## 2019-08-07 LAB
INR BLD: 3.1 (ref 0.9–1.2)
INR PPP: 3.1 (ref 2–3.5)

## 2019-08-07 PROCEDURE — 99211 OFF/OP EST MAY X REQ PHY/QHP: CPT | Performed by: NURSE PRACTITIONER

## 2019-08-07 PROCEDURE — 85610 PROTHROMBIN TIME: CPT

## 2019-08-07 NOTE — PROGRESS NOTES
Anticoagulation Summary  As of 8/7/2019    INR goal:   2.5-3.5   TTR:   100.0 % (4 d)   INR used for dosing:   3.10 (8/7/2019)   Warfarin maintenance plan:   5 mg (5 mg x 1) every day   Weekly warfarin total:   35 mg   Plan last modified:   SHARMILA Bob (8/1/2019)   Next INR check:   8/21/2019   Target end date:   Indefinite    Indications    H/O mitral valve replacement [Z95.2]  A-fib (HCC) (Resolved) [I48.91]  Deep vein thrombosis (HCC) [I82.409] [I82.409]             Anticoagulation Episode Summary     INR check location:       Preferred lab:       Send INR reminders to:       Comments:         Anticoagulation Care Providers     Provider Role Specialty Phone number    Sumanth Yancey M.D. Referring Cardiology [phone]    [facility] Responsible  [phone]        Anticoagulation Patient Findings      HPI:  Morales Olivier seen in clinic today for follow up on anticoagulation therapy in the presence of AF, DVT, MVR.   Denies any changes to current medical/health status since last appointment.   Denies any medication or diet changes.   No current symptoms of bleeding or thrombosis reported.    A/P:   INR therapeutic.   Continue current regimen.   BP recorded in vitals.    Follow up appointment in 2 week(s).    Next Appointment: Wednesday, August 21, 2019 at 10:45 am.    Yelena LOYA

## 2019-08-21 ENCOUNTER — HOSPITAL ENCOUNTER (OUTPATIENT)
Dept: CARDIOLOGY | Facility: MEDICAL CENTER | Age: 64
End: 2019-08-21
Attending: NURSE PRACTITIONER
Payer: MEDICARE

## 2019-08-21 ENCOUNTER — ANTICOAGULATION VISIT (OUTPATIENT)
Dept: VASCULAR LAB | Facility: MEDICAL CENTER | Age: 64
End: 2019-08-21
Attending: INTERNAL MEDICINE
Payer: MEDICARE

## 2019-08-21 VITALS — HEART RATE: 79 BPM | SYSTOLIC BLOOD PRESSURE: 122 MMHG | DIASTOLIC BLOOD PRESSURE: 81 MMHG

## 2019-08-21 DIAGNOSIS — Z95.2 H/O MITRAL VALVE REPLACEMENT: ICD-10-CM

## 2019-08-21 DIAGNOSIS — I82.90 DEEP VEIN THROMBOSIS (DVT) OF NON-EXTREMITY VEIN, UNSPECIFIED CHRONICITY: ICD-10-CM

## 2019-08-21 LAB — INR PPP: 2 (ref 2–3.5)

## 2019-08-21 PROCEDURE — 93005 ELECTROCARDIOGRAM TRACING: CPT | Performed by: NURSE PRACTITIONER

## 2019-08-21 PROCEDURE — 99212 OFFICE O/P EST SF 10 MIN: CPT | Performed by: PHARMACIST

## 2019-08-21 PROCEDURE — 85610 PROTHROMBIN TIME: CPT

## 2019-08-21 PROCEDURE — 93010 ELECTROCARDIOGRAM REPORT: CPT | Performed by: INTERNAL MEDICINE

## 2019-08-21 NOTE — PROGRESS NOTES
"Anticoagulation Summary  As of 2019    INR goal:   2.5-3.5   TTR:   64.6 % (2.6 wk)   INR used for dosin.00! (2019)   Warfarin maintenance plan:   5 mg (5 mg x 1) every day   Weekly warfarin total:   35 mg   Plan last modified:   SHARMILA Bob (2019)   Next INR check:   2019   Target end date:   Indefinite    Indications    H/O mitral valve replacement [Z95.2]  A-fib (HCC) (Resolved) [I48.91]  Deep vein thrombosis (HCC) [I82.409] [I82.409]             Anticoagulation Episode Summary     INR check location:       Preferred lab:       Send INR reminders to:       Comments:         Anticoagulation Care Providers     Provider Role Specialty Phone number    Sumanth Yacney M.D. Referring Cardiology [phone]    [facility] Responsible  [phone]        Anticoagulation Patient Findings  Patient Findings     Positives:   Change in health    Negatives:   Signs/symptoms of thrombosis, Signs/symptoms of bleeding, Laboratory test error suspected, Change in alcohol use, Change in activity, Upcoming invasive procedure, Emergency department visit, Upcoming dental procedure, Missed doses, Extra doses, Change in medications, Change in diet/appetite, Hospital admission, Bruising, Other complaints    Comments:   See note          HPI:  Morales Olivier seen in clinic today, on anticoagulation therapy with warfarin due to hx of DVT and mechanical mitral valve replacement.  Changes to current medical/health status since last appt: As below  Denies signs/symptoms of bleeding and/or thrombosis since the last appt.    Denies any interval changes to diet  Denies any interval changes to medications since last appt.   Denies any complications or cost restrictions with current therapy.   BP recorded in vitals.       A/P   INR  sub-therapeutic at 2.0.   Instructed patient to bolus with 7mg X 1, then resume current warfarin regimen.    Patient states he has felt \"fluttering heart beat\" " "and felt like he \"is going to pass out\" over the last one to two days.  He states these were the same symptoms that preceded his last hospitalization.  Ordered stat EKG in clinic this morning.  Discussed results with VINCENZO Sewell as no cardiologist available at time of test.  She did not see any worrisome aspects to EKG, but suggested have cards review and reach patient by phone.  Vitals stable.  Instructed patient to return home at this point, seek immediate medical attention should s/s worsen.  Will contact patient as soon as cardiologist is able to review EKG.    Update:  S/w Dr. Bloch about results, he is in agreement that EKG shows no evidence of arrhythmic state.  Contacted patient to relay this information and reiterated above information.    Follow up appointment in 1 week(s).    Santiago Gaines, PharmD          "

## 2019-08-22 LAB
EKG IMPRESSION: NORMAL
INR BLD: 2 (ref 0.9–1.2)

## 2019-08-28 ENCOUNTER — ANTICOAGULATION VISIT (OUTPATIENT)
Dept: VASCULAR LAB | Facility: MEDICAL CENTER | Age: 64
End: 2019-08-28
Attending: INTERNAL MEDICINE
Payer: MEDICARE

## 2019-08-28 VITALS — SYSTOLIC BLOOD PRESSURE: 130 MMHG | HEART RATE: 67 BPM | DIASTOLIC BLOOD PRESSURE: 75 MMHG

## 2019-08-28 DIAGNOSIS — I82.90 DEEP VEIN THROMBOSIS (DVT) OF NON-EXTREMITY VEIN, UNSPECIFIED CHRONICITY: ICD-10-CM

## 2019-08-28 DIAGNOSIS — Z95.2 H/O MITRAL VALVE REPLACEMENT: ICD-10-CM

## 2019-08-28 LAB
INR BLD: 3.1 (ref 0.9–1.2)
INR PPP: 3.1 (ref 2–3.5)

## 2019-08-28 PROCEDURE — 99211 OFF/OP EST MAY X REQ PHY/QHP: CPT | Performed by: NURSE PRACTITIONER

## 2019-08-28 PROCEDURE — 85610 PROTHROMBIN TIME: CPT

## 2019-08-28 NOTE — Clinical Note
Hi,I scheduled this pt in an opening you have on Thursday. Really, he needs a PCP and cardiologist but hasn't gotten into either. Is it okay for him to have a vascular visit and we can address DVT, BP, etc? He needs refills on his meds and can't get into [facility] so I suggested [facility]. He knows we can't be his PCP but he's desperate.Hope that's ok!Thanks, dodie

## 2019-08-28 NOTE — PROGRESS NOTES
Anticoagulation Summary  As of 8/28/2019    INR goal:   2.5-3.5   TTR:   61.8 % (3.6 wk)   INR used for dosing:   3.10 (8/28/2019)   Warfarin maintenance plan:   5 mg (5 mg x 1) every day   Weekly warfarin total:   35 mg   Plan last modified:   SHARMILA Bob (8/1/2019)   Next INR check:   9/25/2019   Target end date:   Indefinite    Indications    H/O mitral valve replacement [Z95.2]  A-fib (HCC) (Resolved) [I48.91]  Deep vein thrombosis (HCC) [I82.409] [I82.409]             Anticoagulation Episode Summary     INR check location:       Preferred lab:       Send INR reminders to:       Comments:         Anticoagulation Care Providers     Provider Role Specialty Phone number    Sumanth Yancey M.D. Referring Cardiology [phone]    [facility] Responsible  [phone]        Anticoagulation Patient Findings      HPI:  Morales Olivier seen in clinic today for follow up on anticoagulation therapy in the presence of AF, MVR, DVT.   Denies any changes to current medical/health status since last appointment.   Denies any medication or diet changes.   No current symptoms of bleeding or thrombosis reported.    A/P:   INR therapeutic.   Continue current regimen.   BP recorded in vitals.  Still needs a PCP. He hasn't been able to get into [facility]. I suggest [facility] which he will call. In the meantime, pt scheduled for vascular visit tomorrow.    Follow up appointment in 4 week(s) for INR (per pt's preference).    Next Appointment: Wednesday, Sept 25, 2019 at 11:00 am.    Yelena LOYA

## 2019-08-29 ENCOUNTER — OFFICE VISIT (OUTPATIENT)
Dept: VASCULAR LAB | Facility: MEDICAL CENTER | Age: 64
End: 2019-08-29
Attending: INTERNAL MEDICINE
Payer: MEDICARE

## 2019-08-29 VITALS
DIASTOLIC BLOOD PRESSURE: 86 MMHG | WEIGHT: 266.3 LBS | HEART RATE: 85 BPM | SYSTOLIC BLOOD PRESSURE: 143 MMHG | HEIGHT: 70 IN | BODY MASS INDEX: 38.12 KG/M2

## 2019-08-29 DIAGNOSIS — Z95.0 CARDIAC PACEMAKER IN SITU: ICD-10-CM

## 2019-08-29 DIAGNOSIS — E11.8 TYPE 2 DIABETES MELLITUS WITH COMPLICATION, WITHOUT LONG-TERM CURRENT USE OF INSULIN (HCC): ICD-10-CM

## 2019-08-29 DIAGNOSIS — Z95.2 H/O MITRAL VALVE REPLACEMENT: ICD-10-CM

## 2019-08-29 DIAGNOSIS — Z79.01 CHRONIC ANTICOAGULATION: ICD-10-CM

## 2019-08-29 DIAGNOSIS — E78.1 HYPERTRIGLYCERIDEMIA: ICD-10-CM

## 2019-08-29 DIAGNOSIS — I10 ESSENTIAL HYPERTENSION, BENIGN: ICD-10-CM

## 2019-08-29 DIAGNOSIS — I48.91 ATRIAL FIBRILLATION, UNSPECIFIED TYPE (HCC): ICD-10-CM

## 2019-08-29 DIAGNOSIS — E78.2 MIXED HYPERLIPIDEMIA: ICD-10-CM

## 2019-08-29 DIAGNOSIS — M1A.09X0 CHRONIC GOUT OF MULTIPLE SITES, UNSPECIFIED CAUSE: ICD-10-CM

## 2019-08-29 DIAGNOSIS — N40.0 BENIGN PROSTATIC HYPERPLASIA WITHOUT LOWER URINARY TRACT SYMPTOMS: ICD-10-CM

## 2019-08-29 DIAGNOSIS — I49.3 PVC (PREMATURE VENTRICULAR CONTRACTION): ICD-10-CM

## 2019-08-29 PROCEDURE — 99212 OFFICE O/P EST SF 10 MIN: CPT | Performed by: FAMILY MEDICINE

## 2019-08-29 PROCEDURE — 99204 OFFICE O/P NEW MOD 45 MIN: CPT | Performed by: FAMILY MEDICINE

## 2019-08-29 RX ORDER — BENAZEPRIL HYDROCHLORIDE 10 MG/1
10 TABLET ORAL DAILY
Qty: 90 TAB | Refills: 1 | Status: SHIPPED | OUTPATIENT
Start: 2019-08-29 | End: 2019-12-05

## 2019-08-29 RX ORDER — WARFARIN SODIUM 5 MG/1
5 TABLET ORAL DAILY
Qty: 90 TAB | Refills: 1 | Status: SHIPPED | OUTPATIENT
Start: 2019-08-29 | End: 2019-10-22

## 2019-08-29 RX ORDER — TAMSULOSIN HYDROCHLORIDE 0.4 MG/1
0.4 CAPSULE ORAL
Qty: 90 CAP | Refills: 1 | Status: SHIPPED | OUTPATIENT
Start: 2019-08-29 | End: 2020-02-25

## 2019-08-29 RX ORDER — ALLOPURINOL 100 MG/1
100 TABLET ORAL DAILY
Qty: 90 TAB | Refills: 1 | Status: ON HOLD | OUTPATIENT
Start: 2019-08-29 | End: 2019-10-24 | Stop reason: SDUPTHER

## 2019-08-29 RX ORDER — ATORVASTATIN CALCIUM 20 MG/1
20 TABLET, FILM COATED ORAL DAILY
Qty: 90 TAB | Refills: 1 | Status: SHIPPED | OUTPATIENT
Start: 2019-08-29 | End: 2019-12-05

## 2019-08-29 ASSESSMENT — ENCOUNTER SYMPTOMS
WHEEZING: 0
SHORTNESS OF BREATH: 0
SEIZURES: 0
TREMORS: 0
INSOMNIA: 0
DOUBLE VISION: 0
WEAKNESS: 0
MYALGIAS: 0
DIZZINESS: 0
BRUISES/BLEEDS EASILY: 0
BLOOD IN STOOL: 0
ORTHOPNEA: 0
SORE THROAT: 0
ABDOMINAL PAIN: 0
PALPITATIONS: 0
NAUSEA: 0
HEADACHES: 0
DIARRHEA: 0
NERVOUS/ANXIOUS: 0
VOMITING: 0
COUGH: 0
FOCAL WEAKNESS: 0
DEPRESSION: 0
HEMOPTYSIS: 0
FEVER: 0
CHILLS: 0
BLURRED VISION: 0

## 2019-08-29 NOTE — PROGRESS NOTES
INITIAL VASCULAR VISIT  Subjective:   Morales Olivier is a 64 y.o. male who presents today 8/29/2019 for   Chief Complaint   Patient presents with   • New Patient     Presence of prosthetic heart valve   Referred for LOT determination of anticoagulation in context of acute venothromboembolic disease    HPI:  Pt pending  Establishment with new PCP and has requested eval for review of meds and refills.     VTE disease / Anticoagulation:   Current symptoms:  Denies current SOB, CP, leg swelling, edema, leg pain, cyanosis of digits, redness of extremities.  Anticoagulant: VKA, tolerating well, no bleeding or bruising   Date of initiation of anticoagulation: lifelong   Indications: Afib, s/p MVR   Anticoagulation Summary  As of 8/28/2019    INR goal:   2.5-3.5   TTR:   61.8 % (3.6 wk)   INR used for dosing:   3.10 (8/28/2019)   Warfarin maintenance plan:   5 mg (5 mg x 1) every day   Weekly warfarin total:   35 mg   Plan last modified:   Yelena Carreon, A.P.NHasmukh (8/1/2019)   Next INR check:   9/25/2019   Target end date:   Indefinite    Indications    H/O mitral valve replacement [Z95.2]  A-fib (HCC) (Resolved) [I48.91]  Deep vein thrombosis (HCC) [I82.409] [I82.409]            T2D:  A1c = 6.8% currently.   No current symptoms.  Not taking metformin, had in the past.   Needs refill.     HTN:  Current HTN concerns: Denies   ADRs: No  Recent studies/labs completed (reviewed with patient, noted below): Yes  HTN sx:  No current blurred or changed vision, chest pain, shortness of breath, headache, nausea, dizziness/vertigo   Home BP log:  Not checking, no cuff   24h ABPM completed: not tested  Adherence to current HTN meds: compliant all of the time     Hyperlipidemia:    Stable, no current concerns  Current treatment: Moderate intensity statin atorva 20mg in the past but had stopped as he ran out of meds   Myalgias? No  Other adverse drug reactions? No  Lipid profile:   Lab Results   Component Value Date/Time     CHOLSTRLTOT 134 03/13/2018 1107    TRIGLYCERIDE 165 (H) 03/13/2018 1107    HDL 22 (A) 03/13/2018 1107    LDL 79 03/13/2018 1107     LDL (mg/dL)   Date Value   03/13/2018 79   12/06/2016 105 (H)     HDL (mg/dL)   Date Value   03/13/2018 22 (A)   12/06/2016 24 (A)     BPH:  Ran out of tamsulosin and having worsening LUTS, nocturia every 2-3h, hesitancy.   No prior interventions.  No urology NV.  Does not recall PSA testing or hx of prostate CA.  No dysuria, hematuria.     Past Medical History:   Diagnosis Date   • Atrial fibrillation (HCC)    • Bronchitis    • CAD (coronary artery disease)    • Gout    • Heart valve disease     mitral valve replacement (bovine)   • Hypertension    • Pacemaker    • Personal history of venous thrombosis and embolism 2009    DVT right leg   • PVC (premature ventricular contraction) 4/13/2019   • Renal disorder     kidney stones   • Type II or unspecified type diabetes mellitus without mention of complication, not stated as uncontrolled     DM type II- diet controlled     Past Surgical History:   Procedure Laterality Date   • MITRAL VALVE REPLACE  3/8/2017    Procedure: MITRAL VALVE REPLACE-REDO;  Surgeon: Cornelia Wright M.D.;  Location: [facility];  Service:    • KD  3/8/2017    Procedure: KD;  Surgeon: Cornelia Wright M.D.;  Location: [facility];  Service:    • IRRIGATION & DEBRIDEMENT GENERAL  7/20/2009    Performed by MICHEL URBINA at [facility]   • WIDE EXCISION  7/20/2009    Performed by MICHEL URBINA at [facility]   • ANGIOGRAM  7/4/2009    Performed by MICHEL URBINA at [facility]   • CATH REMOVAL  7/4/2009    Performed by MICHEL URBINA at [facility]   • THROMBECTOMY  7/4/2009    Performed by MICHEL URBINA at [facility]   • EMBOLECTOMY  7/4/2009    Performed by MICHEL URBINA at [facility]   • ANGIOGRAM  7/3/2009    Performed by MORGAN WARD at  [facility]   • MITRAL VALVE REPLACE  3/14/08    Performed by JEANA MARTELL at SURGERY [facility]   • MAZE PROCEDURE  3/14/08    Performed by JEANA MARTELL at SUR[facility]ERY [facility]   • OTHER CARDIAC SURGERY      mitral valve replacement   • OTHER ABDOMINAL SURGERY      imbulica repair      Family History   Problem Relation Age of Onset   • Heart Disease Neg Hx      Social History     Tobacco Use   Smoking Status Former Smoker   • Last attempt to quit: 1981   • Years since quittin.6   Smokeless Tobacco Never Used     Social History     Tobacco Use   • Smoking status: Former Smoker     Last attempt to quit: 1981     Years since quittin.6   • Smokeless tobacco: Never Used   Substance Use Topics   • Alcohol use: No   • Drug use: No     Outpatient Encounter Medications as of 2019   Medication Sig Dispense Refill   • tamsulosin (FLOMAX) 0.4 MG capsule Take 1 Cap by mouth ONE-HALF HOUR AFTER BREAKFAST for 180 days. 90 Cap 1   • benazepril (LOTENSIN) 10 MG Tab Take 1 Tab by mouth every day for 180 days. 90 Tab 1   • allopurinol (ZYLOPRIM) 100 MG Tab Take 1 Tab by mouth every day for 180 days. 90 Tab 1   • atorvastatin (LIPITOR) 20 MG Tab Take 1 Tab by mouth every day for 180 days. 90 Tab 1   • metFORMIN (GLUCOPHAGE) 500 MG Tab Take 1 Tab by mouth 2 times a day, with meals for 180 days. 180 Tab 1   • metoprolol (LOPRESSOR) 25 MG Tab Take 1 Tab by mouth 2 times a day for 180 days. 180 Tab 1   • warfarin (COUMADIN) 5 MG Tab Take 1 Tab by mouth every day for 180 days. 90 Tab 1   • acetaminophen (TYLENOL) 500 MG Tab Take 500-1,000 mg by mouth every 6 hours as needed for Moderate Pain.     • [DISCONTINUED] warfarin (COUMADIN) 5 MG Tab Take 1 Tab by mouth every day. 30 Tab 3   • [DISCONTINUED] metoprolol (LOPRESSOR) 25 MG Tab Take 1 Tab by mouth 2 times a day. 90 Tab 0   • [DISCONTINUED] allopurinol (ZYLOPRIM) 100 MG Tab Take 1 Tab by mouth every day. 30 Tab 0   • [DISCONTINUED]  "warfarin (COUMADIN) 5 MG Tab Take 4-5 mg by mouth every day. 4 MG on Sun, Tues, Thurs, Sat  5MG on Mon, Wed, and Fri     • [DISCONTINUED] tamsulosin (FLOMAX) 0.4 MG capsule Take 0.4 mg by mouth every evening.       No facility-administered encounter medications on file as of 8/29/2019.      No Known Allergies  DIET AND EXERCISE:  Weight Change:increasing   BMI Readings from Last 5 Encounters:   08/29/19 38.21 kg/m²   04/16/19 36.19 kg/m²   04/13/19 36.63 kg/m²   12/04/18 28.47 kg/m²   05/18/18 35.87 kg/m²      Diet: low fat, low carbohydrate  Exercise: sporadic irregular exercise     Review of Systems   Constitutional: Negative for chills, fever and malaise/fatigue.   HENT: Negative for nosebleeds, sore throat and tinnitus.    Eyes: Negative for blurred vision and double vision.   Respiratory: Negative for cough, hemoptysis, shortness of breath and wheezing.    Cardiovascular: Negative for chest pain, palpitations, orthopnea and leg swelling.   Gastrointestinal: Negative for abdominal pain, blood in stool, diarrhea, melena, nausea and vomiting.   Genitourinary: Positive for frequency and urgency. Negative for dysuria, flank pain and hematuria.   Musculoskeletal: Negative for joint pain and myalgias.   Skin: Negative for itching and rash.   Neurological: Negative for dizziness, tremors, focal weakness, seizures, weakness and headaches.   Endo/Heme/Allergies: Negative for polydipsia. Does not bruise/bleed easily.   Psychiatric/Behavioral: Negative for depression. The patient is not nervous/anxious and does not have insomnia.       Objective:     Vitals:    08/29/19 1025 08/29/19 1031   BP: 131/93 143/86   BP Location: Left arm Left arm   Patient Position: Sitting Sitting   BP Cuff Size: Large adult Adult   Pulse: 87 85   Weight: 120.8 kg (266 lb 4.8 oz)    Height: 1.778 m (5' 10\")       BP Readings from Last 5 Encounters:   08/29/19 143/86   08/28/19 130/75   08/21/19 122/81   08/07/19 126/89   08/01/19 143/99    "   Body mass index is 38.21 kg/m².  Physical Exam   Constitutional: He is oriented to person, place, and time. He appears well-developed and well-nourished. He is cooperative. No distress.   HENT:   Head: Normocephalic and atraumatic.   Mouth/Throat: Oropharynx is clear and moist and mucous membranes are normal.   Eyes: Pupils are equal, round, and reactive to light. Conjunctivae are normal.   Neck: Trachea normal and normal range of motion. Neck supple. Normal carotid pulses and no JVD present. Carotid bruit is not present. No thyromegaly present.   Cardiovascular: Normal rate, regular rhythm, normal heart sounds and intact distal pulses. Exam reveals no gallop and no friction rub.   No murmur heard.  Pulses:       Carotid pulses are 2+ on the right side, and 2+ on the left side.       Radial pulses are 2+ on the right side, and 2+ on the left side.        Dorsalis pedis pulses are 2+ on the right side, and 2+ on the left side.        Posterior tibial pulses are 2+ on the right side, and 2+ on the left side.   Edema:    RLE: none     LLE: none   Spider telangectasia:       RLE:  None      LLE: none   Varicosities:         RLE: none      LLE: none   Corona phlebectatica:      RLE:  None        LLE:  None   Cording:         RLE:  None     LLE: None    Pulmonary/Chest: Effort normal and breath sounds normal. No respiratory distress.   Abdominal: Soft. Bowel sounds are normal. He exhibits no distension and no mass. There is no hepatosplenomegaly. There is no tenderness. There is no rebound and no guarding.   Musculoskeletal: He exhibits no edema.   Lymphadenopathy:     He has no cervical adenopathy.   Neurological: He is alert and oriented to person, place, and time. No cranial nerve deficit. Coordination and gait normal.   Skin: Skin is warm and dry. He is not diaphoretic. No cyanosis. No pallor. Nails show no clubbing.   Psychiatric: He has a normal mood and affect.     Lab Results   Component Value Date     CHOLSTRLTOT 134 2018    LDL 79 2018    HDL 22 (A) 2018    TRIGLYCERIDE 165 (H) 2018      Lab Results   Component Value Date    PROTHROMBTM 23.5 (H) 2019    INR 3.10 2019       Lab Results   Component Value Date    HBA1C 6.3 (H) 2019      Lab Results   Component Value Date    SODIUM 137 2019    POTASSIUM 4.6 2019    CHLORIDE 102 2019    CO2 27 2019    GLUCOSE 86 2019    BUN 20 2019    CREATININE 1.34 2019    IFAFRICA >60 2019    IFNOTAFR 54 (A) 2019        Lab Results   Component Value Date    WBC 8.4 2019    RBC 4.34 (L) 2019    HEMOGLOBIN 14.4 2019    HEMATOCRIT 43.5 2019    .2 (H) 2019    MCH 33.2 (H) 2019    MCHC 33.1 (L) 2019    MPV 9.7 2019      VASCULAR IMAGING:     Last EKG:   Results for orders placed or performed during the hospital encounter of 19   EKG   Result Value Ref Range    Report       [facility]    Test Date:  2019  Pt Name:    AILYN MCINTOSH                 Department: EKG  MRN:        [id number]                      Room:  Gender:     Male                         Technician: [facility]  :        [id number]                   Requested By:HOLLAND TOM  Order #:    [id number]                    Reading MD: Yana Carpio MD    Measurements  Intervals                                Axis  Rate:       81                           P:          0  MS:         158                          QRS:        62  QRSD:       90                           T:          -16  QT:         406  QTc:        472    Interpretive Statements  VENTRICULAR-PACED COMPLEXES  NO FURTHER RHYTHM ANALYSIS ATTEMPTED DUE TO PACED RHYTHM  Compared to ECG 2019 13:25:29  No significant changes    Electronically Signed On 2019 22:01:14 PDT by Yana Carpio MD       CTA PE   1.  No evidence of pulmonary embolus.  2.  Postsurgical change consistent with recent  median sternotomy with fluid within the sternotomy defect along the inferior aspect of the sternum with separation of the sternotomy defect by approximately 15 mm raising the possibility of sternal   dehiscence. Most inferior sternotomy wire does not appear to extend through the right side of the sternum.  3.  Moderate right and trace left pleural effusions.    Echo 2017  1. Left ventricular ejection fraction is visually estimated to be 60%.   2. There is apical inferior septal hypokinesis.   3. Known mitral valve bioprosthesis which is functioning normally with   appropriate transvalvular gradient. Mean transvalvular gradient is  5   mmHg at a heart rate of  80 BPM.  4. Right ventricular systolic pressure is estimated to be 25 mmHg.    BLE venous 4/14/19   Bilateral lower extremities:   Pulsatile venous flow was observed consistent with elevated right heart    pressure.    No evidence of superficial or deep venous thrombosis.     MPI 4/13/19   NUCLEAR IMAGING INTERPRETATION   No evidence of significant jeopardized viable myocardium or prior myocardial    infarction.   Normal left ventricular size, ejection fraction, and wall motion.   ECG INTERPRETATION   Non diagnositic stress ECG due to underlying rhythm    Medical Decision Making:  Today's Assessment / Status / Plan:     1. Essential hypertension, benign  Comp Metabolic Panel    MICROALBUMIN CREAT RATIO URINE    CBC WITHOUT DIFFERENTIAL    MICROALBUMIN CREAT RATIO URINE   2. Atrial fibrillation, unspecified type (HCC)     3. Cardiac pacemaker in situ     4. Chronic anticoagulation     5. H/O mitral valve replacement     6. PVC (premature ventricular contraction)     7. Chronic gout of multiple sites, unspecified cause  URIC ACID   8. Benign prostatic hyperplasia without lower urinary tract symptoms  PROSTATE SPECIFIC AG SCREENING   9. BMI 38.0-38.9,adult     10. Type 2 diabetes mellitus with complication, without long-term current use of insulin (HCC)   HEMOGLOBIN A1C    TSH WITH REFLEX TO FT4    MICROALBUMIN CREAT RATIO URINE   11. Hypertriglyceridemia     12. Mixed hyperlipidemia       Patient Type: Primary Prevention and DM    Etiology of Established CVD if Present:   1) s/p MVR     Anticoagulation:  Indication for anticoagulation: afib, s/p MVR  Anti-Platelet/Anti-Coagulant Tx: yes  Anti-Coagulation Plan:  - continue VKA, INR monitoring routinely with AC clinic  - report any bleeding to AC clinic     Lipid Management: Qualifies for Statin Therapy Based on 2013 ACC/AHA Guidelines: yes  Calculated 10-Year Risk of ASCVD: N/A  Currently on Statin: Yes  Notable metabolic dyslipidemia   NLA Risk Category:   Very high:  Evidence of ASCVD, T2D with 2 or more RFs   Tx threshold:  non-HDL-C >99, LDL-C >69  Tx goal: non-HDL-C <100,  LDL-C <70  (optional: apoB <80)  At goal:  yes  Plan:  - reinforced ongoing TLC measures   - restart atorva 20mg, gave refills for 6mo increase to 40-80mg as needed   - consider fenofibrate or Rx-grade omega-3 FA if trigs climb despite statin and TLC   - update labs     Blood Pressure Management:Goal: ACC/AHA (2017) goal <130/80  Home BP at goal:  no  Office BP at goal:  no  Echo: abnormal inferior hypokinesis, preserved EF, s/p bioprosth MVR  ACR: pending  Device candidate? no  Plan:   Monitoring:   - continue home BP monitoring, reviewed correct technique:  Yes   - order 24h ABPM:  UNDECIDED, consider if white coat effect   - monitor lytes/gfr routinely   Medications:  ACEi/ARB: start benazepril 10mg, increase up to 40mg as needed for BP control  DHP-CCB: consider amlodipine 5-10mg   Thiazide: use chlorthalidone as 4th agent   Other:   BB:  Continue metoprolol 25mg BID due to Afib     Glycemic Status: Diabetic, T2, non-insulin   Last A1c = 6.3  Goal A1c <7.0  ACR: pending with next labs   Plan:  - refilled metformin 500mg BID, plan to increase to 1000mg BID at next visit   - add SGLT2i or weekly GLP1RA as next agents  - consider DPP4i as  optional therapy   - recommmend to immediately establish for routine care with PCP to include regular A1c monitoring, annual albumin/creatinine ratio (ACR), annual diabetic retinopathy screening, foot exams, annual flu vaccine, and updates to pneumonia vaccines as appropriate     Smoking: continued complete avoidance of all tobacco products     Physical Activity: continue healthy activity to improve CV fitness, see care instructions for additional details     Weight Management and Nutrition: Dietary plan was discussed with patient at this visit including ADA diet, DASH, low sodium and/or as outlined in care instructions     Other:   1) gout, stable.   - refilled allopurinol, update uric acid to target <6.0  - defer mgmt to PCP     2) BPH w/ LUTS.  Mild symptoms.   - refilled tamsulosin 0.4mg daily, update labs, needs to f/u with PCP     3) Afib.  Stable, rate-controlled and anticoagulated due to high CARSON-VASC.  Low risk of bleed.  - continue current meds, establish with cardiology, has been referred already and pending scheduling, MA to assist with scheduling     Instructed to follow-up with PCP for remainder of adult medical needs: yes  We will partner with other providers in the management of established vascular disease and cardiometabolic risk factors.    Studies to Be Obtained: none  Labs to Be Obtained: uric acid, PSA, Tsh, a1c, cbc, acr, cmp     Follow up in: 3 months    Adolph Ingram M.D.

## 2019-08-29 NOTE — PATIENT INSTRUCTIONS
1) start benazepril and atorvastatin   2) restart metformin 2 times daily   3) check labs in 3 months when fasting     Physical activity:  - engage in moderate to vigorous physical activity such as brisk walking, swimming, cycling, >150 minutes per week at 30-40 minutes per session, 3 to 5 times weekly or as directed at today's visit     General healthly nutrition advice:  - the USDA food pattern (https://www.cnpp.usda.gov/USDAFoodPatterns)  - plate method (https://www.choosemyplate.gov/)  - consume diet that emphasizes intake of vegetables, fruits, and whole grains,  - use low fat diary products, poultry, fish, legumes, nontropical vegetable oils, nuts  - limit intake of sweets, sugar-sweetned beverages, and red meats   - reduce saturated and trans fats to <6% of your daily calories   - consume no more than 2,400mg of sodium daily (look at food labels) or if you have high BP then reduce to no more than 1,500mg of sodium daily     BP lowering diet:   - DASH diet (https://www.heart.org/en/health-topics/high-blood-pressure/changes-you-can-make-to-manage-high-blood-pressure/managing-blood-pressure-with-a-heart-healthy-diet)    Cholesterol-reducing diets:   - AHA diet  (http://www.heart.org/en/healthy-living/healthy-eating/eat-smart/nutrition-basics/aha-diet-and-lifestyle-recommendations)   - Mediterranean diet (http://www.heart.org/en/healthy-living/healthy-eating/eat-smart/nutrition-basics/mediterranean-diet)   - lowering triglycerides: (http://my.americanheart.org/idc/groups/ahamah-public/@[facility]/@sop/@[facility]/documents/downloadable/[facility]_425988.pdf)

## 2019-08-30 ASSESSMENT — ENCOUNTER SYMPTOMS
POLYDIPSIA: 0
FLANK PAIN: 0

## 2019-09-25 ENCOUNTER — HOSPITAL ENCOUNTER (OUTPATIENT)
Facility: MEDICAL CENTER | Age: 64
End: 2019-09-26
Attending: EMERGENCY MEDICINE | Admitting: INTERNAL MEDICINE
Payer: MEDICARE

## 2019-09-25 ENCOUNTER — APPOINTMENT (OUTPATIENT)
Dept: RADIOLOGY | Facility: MEDICAL CENTER | Age: 64
End: 2019-09-25
Attending: EMERGENCY MEDICINE
Payer: MEDICARE

## 2019-09-25 ENCOUNTER — APPOINTMENT (OUTPATIENT)
Dept: RADIOLOGY | Facility: MEDICAL CENTER | Age: 64
End: 2019-09-25
Attending: INTERNAL MEDICINE
Payer: MEDICARE

## 2019-09-25 DIAGNOSIS — R00.2 PALPITATIONS: ICD-10-CM

## 2019-09-25 DIAGNOSIS — R07.9 CHEST PAIN, UNSPECIFIED TYPE: ICD-10-CM

## 2019-09-25 PROBLEM — G47.33 OSA (OBSTRUCTIVE SLEEP APNEA): Status: ACTIVE | Noted: 2019-09-25

## 2019-09-25 PROBLEM — M25.562 LEFT KNEE PAIN: Status: ACTIVE | Noted: 2019-09-25

## 2019-09-25 PROBLEM — E66.9 OBESITY (BMI 30-39.9): Status: ACTIVE | Noted: 2019-08-29

## 2019-09-25 LAB
ALBUMIN SERPL BCP-MCNC: 4.3 G/DL (ref 3.2–4.9)
ALBUMIN/GLOB SERPL: 2.4 G/DL
ALP SERPL-CCNC: 57 U/L (ref 30–99)
ALT SERPL-CCNC: 13 U/L (ref 2–50)
AMPHET UR QL SCN: NEGATIVE
ANION GAP SERPL CALC-SCNC: 9 MMOL/L (ref 0–11.9)
AST SERPL-CCNC: 16 U/L (ref 12–45)
BARBITURATES UR QL SCN: NEGATIVE
BASOPHILS # BLD AUTO: 0.5 % (ref 0–1.8)
BASOPHILS # BLD: 0.03 K/UL (ref 0–0.12)
BENZODIAZ UR QL SCN: NEGATIVE
BILIRUB SERPL-MCNC: 0.7 MG/DL (ref 0.1–1.5)
BUN SERPL-MCNC: 29 MG/DL (ref 8–22)
BZE UR QL SCN: NEGATIVE
CALCIUM SERPL-MCNC: 9.2 MG/DL (ref 8.5–10.5)
CANNABINOIDS UR QL SCN: NEGATIVE
CHLORIDE SERPL-SCNC: 106 MMOL/L (ref 96–112)
CHOLEST SERPL-MCNC: 85 MG/DL (ref 100–199)
CO2 SERPL-SCNC: 23 MMOL/L (ref 20–33)
CREAT SERPL-MCNC: 1.26 MG/DL (ref 0.5–1.4)
D DIMER PPP IA.FEU-MCNC: 1.21 UG/ML (FEU) (ref 0–0.5)
EKG IMPRESSION: NORMAL
EOSINOPHIL # BLD AUTO: 0.21 K/UL (ref 0–0.51)
EOSINOPHIL NFR BLD: 3.2 % (ref 0–6.9)
ERYTHROCYTE [DISTWIDTH] IN BLOOD BY AUTOMATED COUNT: 48.2 FL (ref 35.9–50)
EST. AVERAGE GLUCOSE BLD GHB EST-MCNC: 157 MG/DL
GLOBULIN SER CALC-MCNC: 1.8 G/DL (ref 1.9–3.5)
GLUCOSE BLD-MCNC: 111 MG/DL (ref 65–99)
GLUCOSE BLD-MCNC: 159 MG/DL (ref 65–99)
GLUCOSE SERPL-MCNC: 127 MG/DL (ref 65–99)
HBA1C MFR BLD: 7.1 % (ref 0–5.6)
HCT VFR BLD AUTO: 44.6 % (ref 42–52)
HDLC SERPL-MCNC: 23 MG/DL
HGB BLD-MCNC: 15.1 G/DL (ref 14–18)
IMM GRANULOCYTES # BLD AUTO: 0.02 K/UL (ref 0–0.11)
IMM GRANULOCYTES NFR BLD AUTO: 0.3 % (ref 0–0.9)
INR PPP: 2.26 (ref 0.87–1.13)
LDLC SERPL CALC-MCNC: 33 MG/DL
LYMPHOCYTES # BLD AUTO: 0.73 K/UL (ref 1–4.8)
LYMPHOCYTES NFR BLD: 11 % (ref 22–41)
MAGNESIUM SERPL-MCNC: 1.7 MG/DL (ref 1.5–2.5)
MCH RBC QN AUTO: 33.5 PG (ref 27–33)
MCHC RBC AUTO-ENTMCNC: 33.9 G/DL (ref 33.7–35.3)
MCV RBC AUTO: 98.9 FL (ref 81.4–97.8)
METHADONE UR QL SCN: NEGATIVE
MONOCYTES # BLD AUTO: 0.48 K/UL (ref 0–0.85)
MONOCYTES NFR BLD AUTO: 7.2 % (ref 0–13.4)
NEUTROPHILS # BLD AUTO: 5.17 K/UL (ref 1.82–7.42)
NEUTROPHILS NFR BLD: 77.8 % (ref 44–72)
NRBC # BLD AUTO: 0 K/UL
NRBC BLD-RTO: 0 /100 WBC
OPIATES UR QL SCN: POSITIVE
OXYCODONE UR QL SCN: POSITIVE
PCP UR QL SCN: NEGATIVE
PLATELET # BLD AUTO: 134 K/UL (ref 164–446)
PMV BLD AUTO: 10.2 FL (ref 9–12.9)
POTASSIUM SERPL-SCNC: 4.8 MMOL/L (ref 3.6–5.5)
PROPOXYPH UR QL SCN: NEGATIVE
PROT SERPL-MCNC: 6.1 G/DL (ref 6–8.2)
PROTHROMBIN TIME: 25.7 SEC (ref 12–14.6)
RBC # BLD AUTO: 4.51 M/UL (ref 4.7–6.1)
SODIUM SERPL-SCNC: 138 MMOL/L (ref 135–145)
TRIGL SERPL-MCNC: 143 MG/DL (ref 0–149)
TROPONIN T SERPL-MCNC: 15 NG/L (ref 6–19)
TROPONIN T SERPL-MCNC: 18 NG/L (ref 6–19)
URATE SERPL-MCNC: 7.6 MG/DL (ref 2.5–8.3)
WBC # BLD AUTO: 6.6 K/UL (ref 4.8–10.8)

## 2019-09-25 PROCEDURE — 73564 X-RAY EXAM KNEE 4 OR MORE: CPT | Mod: LT

## 2019-09-25 PROCEDURE — 96376 TX/PRO/DX INJ SAME DRUG ADON: CPT | Mod: XU

## 2019-09-25 PROCEDURE — 96372 THER/PROPH/DIAG INJ SC/IM: CPT | Mod: XU

## 2019-09-25 PROCEDURE — 96374 THER/PROPH/DIAG INJ IV PUSH: CPT | Mod: XU

## 2019-09-25 PROCEDURE — 700102 HCHG RX REV CODE 250 W/ 637 OVERRIDE(OP): Performed by: INTERNAL MEDICINE

## 2019-09-25 PROCEDURE — 84484 ASSAY OF TROPONIN QUANT: CPT

## 2019-09-25 PROCEDURE — 80061 LIPID PANEL: CPT

## 2019-09-25 PROCEDURE — 71275 CT ANGIOGRAPHY CHEST: CPT

## 2019-09-25 PROCEDURE — A9270 NON-COVERED ITEM OR SERVICE: HCPCS | Performed by: INTERNAL MEDICINE

## 2019-09-25 PROCEDURE — 36415 COLL VENOUS BLD VENIPUNCTURE: CPT

## 2019-09-25 PROCEDURE — 93005 ELECTROCARDIOGRAM TRACING: CPT | Performed by: EMERGENCY MEDICINE

## 2019-09-25 PROCEDURE — 85025 COMPLETE CBC W/AUTO DIFF WBC: CPT

## 2019-09-25 PROCEDURE — 99285 EMERGENCY DEPT VISIT HI MDM: CPT

## 2019-09-25 PROCEDURE — 83735 ASSAY OF MAGNESIUM: CPT

## 2019-09-25 PROCEDURE — 80053 COMPREHEN METABOLIC PANEL: CPT

## 2019-09-25 PROCEDURE — 700111 HCHG RX REV CODE 636 W/ 250 OVERRIDE (IP): Performed by: EMERGENCY MEDICINE

## 2019-09-25 PROCEDURE — 700111 HCHG RX REV CODE 636 W/ 250 OVERRIDE (IP): Performed by: INTERNAL MEDICINE

## 2019-09-25 PROCEDURE — 71045 X-RAY EXAM CHEST 1 VIEW: CPT

## 2019-09-25 PROCEDURE — 82962 GLUCOSE BLOOD TEST: CPT

## 2019-09-25 PROCEDURE — 99220 PR INITIAL OBSERVATION CARE,LEVL III: CPT | Performed by: INTERNAL MEDICINE

## 2019-09-25 PROCEDURE — G0378 HOSPITAL OBSERVATION PER HR: HCPCS

## 2019-09-25 PROCEDURE — 84550 ASSAY OF BLOOD/URIC ACID: CPT

## 2019-09-25 PROCEDURE — 700117 HCHG RX CONTRAST REV CODE 255: Performed by: INTERNAL MEDICINE

## 2019-09-25 PROCEDURE — 80307 DRUG TEST PRSMV CHEM ANLYZR: CPT

## 2019-09-25 PROCEDURE — 85379 FIBRIN DEGRADATION QUANT: CPT

## 2019-09-25 PROCEDURE — 96375 TX/PRO/DX INJ NEW DRUG ADDON: CPT | Mod: XU

## 2019-09-25 PROCEDURE — 85610 PROTHROMBIN TIME: CPT

## 2019-09-25 PROCEDURE — 83036 HEMOGLOBIN GLYCOSYLATED A1C: CPT

## 2019-09-25 RX ORDER — WARFARIN SODIUM 5 MG/1
5 TABLET ORAL DAILY
Status: DISCONTINUED | OUTPATIENT
Start: 2019-09-25 | End: 2019-09-26 | Stop reason: HOSPADM

## 2019-09-25 RX ORDER — BENAZEPRIL HYDROCHLORIDE 20 MG/1
10 TABLET ORAL DAILY
Status: DISCONTINUED | OUTPATIENT
Start: 2019-09-26 | End: 2019-09-26 | Stop reason: HOSPADM

## 2019-09-25 RX ORDER — TAMSULOSIN HYDROCHLORIDE 0.4 MG/1
0.4 CAPSULE ORAL
Status: DISCONTINUED | OUTPATIENT
Start: 2019-09-26 | End: 2019-09-26 | Stop reason: HOSPADM

## 2019-09-25 RX ORDER — BISACODYL 10 MG
10 SUPPOSITORY, RECTAL RECTAL
Status: DISCONTINUED | OUTPATIENT
Start: 2019-09-25 | End: 2019-09-26 | Stop reason: HOSPADM

## 2019-09-25 RX ORDER — ENALAPRILAT 1.25 MG/ML
1.25 INJECTION INTRAVENOUS EVERY 6 HOURS PRN
Status: DISCONTINUED | OUTPATIENT
Start: 2019-09-25 | End: 2019-09-26 | Stop reason: HOSPADM

## 2019-09-25 RX ORDER — AMOXICILLIN 250 MG
2 CAPSULE ORAL 2 TIMES DAILY
Status: DISCONTINUED | OUTPATIENT
Start: 2019-09-25 | End: 2019-09-26 | Stop reason: HOSPADM

## 2019-09-25 RX ORDER — ONDANSETRON 2 MG/ML
4 INJECTION INTRAMUSCULAR; INTRAVENOUS EVERY 4 HOURS PRN
Status: DISCONTINUED | OUTPATIENT
Start: 2019-09-25 | End: 2019-09-26 | Stop reason: HOSPADM

## 2019-09-25 RX ORDER — NITROGLYCERIN 0.4 MG/1
0.4 TABLET SUBLINGUAL
Status: DISCONTINUED | OUTPATIENT
Start: 2019-09-25 | End: 2019-09-26 | Stop reason: HOSPADM

## 2019-09-25 RX ORDER — LABETALOL HYDROCHLORIDE 5 MG/ML
10 INJECTION, SOLUTION INTRAVENOUS EVERY 4 HOURS PRN
Status: DISCONTINUED | OUTPATIENT
Start: 2019-09-25 | End: 2019-09-26 | Stop reason: HOSPADM

## 2019-09-25 RX ORDER — DIPHENHYDRAMINE HCL 25 MG
25 TABLET ORAL ONCE
Status: COMPLETED | OUTPATIENT
Start: 2019-09-25 | End: 2019-09-25

## 2019-09-25 RX ORDER — ALUMINA, MAGNESIA, AND SIMETHICONE 2400; 2400; 240 MG/30ML; MG/30ML; MG/30ML
30 SUSPENSION ORAL EVERY 4 HOURS PRN
Status: DISCONTINUED | OUTPATIENT
Start: 2019-09-25 | End: 2019-09-26 | Stop reason: HOSPADM

## 2019-09-25 RX ORDER — ONDANSETRON 4 MG/1
4 TABLET, ORALLY DISINTEGRATING ORAL EVERY 4 HOURS PRN
Status: DISCONTINUED | OUTPATIENT
Start: 2019-09-25 | End: 2019-09-26 | Stop reason: HOSPADM

## 2019-09-25 RX ORDER — COLCHICINE 0.6 MG/1
1.2 TABLET ORAL ONCE
Status: COMPLETED | OUTPATIENT
Start: 2019-09-25 | End: 2019-09-25

## 2019-09-25 RX ORDER — AMINOPHYLLINE 25 MG/ML
100 INJECTION, SOLUTION INTRAVENOUS
Status: DISCONTINUED | OUTPATIENT
Start: 2019-09-25 | End: 2019-09-26 | Stop reason: HOSPADM

## 2019-09-25 RX ORDER — MORPHINE SULFATE 4 MG/ML
1-2 INJECTION, SOLUTION INTRAMUSCULAR; INTRAVENOUS
Status: DISCONTINUED | OUTPATIENT
Start: 2019-09-25 | End: 2019-09-26 | Stop reason: HOSPADM

## 2019-09-25 RX ORDER — ACETAMINOPHEN 325 MG/1
650 TABLET ORAL EVERY 6 HOURS PRN
Status: DISCONTINUED | OUTPATIENT
Start: 2019-09-25 | End: 2019-09-26 | Stop reason: HOSPADM

## 2019-09-25 RX ORDER — COLCHICINE 0.6 MG/1
0.6 TABLET ORAL
Status: COMPLETED | OUTPATIENT
Start: 2019-09-25 | End: 2019-09-25

## 2019-09-25 RX ORDER — ALLOPURINOL 100 MG/1
100 TABLET ORAL DAILY
Status: DISCONTINUED | OUTPATIENT
Start: 2019-09-26 | End: 2019-09-26 | Stop reason: HOSPADM

## 2019-09-25 RX ORDER — DIPHENHYDRAMINE HCL 25 MG
25 TABLET ORAL ONCE
Status: COMPLETED | OUTPATIENT
Start: 2019-09-26 | End: 2019-09-26

## 2019-09-25 RX ORDER — ATORVASTATIN CALCIUM 20 MG/1
20 TABLET, FILM COATED ORAL DAILY
Status: DISCONTINUED | OUTPATIENT
Start: 2019-09-26 | End: 2019-09-26 | Stop reason: HOSPADM

## 2019-09-25 RX ORDER — REGADENOSON 0.08 MG/ML
0.4 INJECTION, SOLUTION INTRAVENOUS
Status: DISCONTINUED | OUTPATIENT
Start: 2019-09-25 | End: 2019-09-26 | Stop reason: HOSPADM

## 2019-09-25 RX ORDER — POLYETHYLENE GLYCOL 3350 17 G/17G
1 POWDER, FOR SOLUTION ORAL
Status: DISCONTINUED | OUTPATIENT
Start: 2019-09-25 | End: 2019-09-26 | Stop reason: HOSPADM

## 2019-09-25 RX ORDER — MORPHINE SULFATE 4 MG/ML
4 INJECTION, SOLUTION INTRAMUSCULAR; INTRAVENOUS
Status: DISCONTINUED | OUTPATIENT
Start: 2019-09-25 | End: 2019-09-25

## 2019-09-25 RX ORDER — OXYCODONE HYDROCHLORIDE 5 MG/1
5-10 TABLET ORAL EVERY 4 HOURS PRN
Status: DISCONTINUED | OUTPATIENT
Start: 2019-09-25 | End: 2019-09-26 | Stop reason: HOSPADM

## 2019-09-25 RX ORDER — COLCHICINE 0.6 MG/1
0.6 TABLET ORAL DAILY
Status: DISCONTINUED | OUTPATIENT
Start: 2019-09-26 | End: 2019-09-26 | Stop reason: HOSPADM

## 2019-09-25 RX ADMIN — COLCHICINE 1.2 MG: 0.6 TABLET, FILM COATED ORAL at 15:59

## 2019-09-25 RX ADMIN — OXYCODONE HYDROCHLORIDE 10 MG: 5 TABLET ORAL at 19:52

## 2019-09-25 RX ADMIN — OXYCODONE HYDROCHLORIDE 10 MG: 5 TABLET ORAL at 15:22

## 2019-09-25 RX ADMIN — IOHEXOL 75 ML: 350 INJECTION, SOLUTION INTRAVENOUS at 17:21

## 2019-09-25 RX ADMIN — COLCHICINE 0.6 MG: 0.6 TABLET, FILM COATED ORAL at 16:54

## 2019-09-25 RX ADMIN — MORPHINE SULFATE 4 MG: 4 INJECTION INTRAVENOUS at 11:32

## 2019-09-25 RX ADMIN — MORPHINE SULFATE 4 MG: 4 INJECTION INTRAVENOUS at 10:36

## 2019-09-25 RX ADMIN — INSULIN HUMAN 1 UNITS: 100 INJECTION, SOLUTION PARENTERAL at 20:43

## 2019-09-25 RX ADMIN — METOPROLOL TARTRATE 25 MG: 25 TABLET, FILM COATED ORAL at 18:37

## 2019-09-25 RX ADMIN — WARFARIN SODIUM 5 MG: 5 TABLET ORAL at 18:37

## 2019-09-25 RX ADMIN — DIPHENHYDRAMINE HCL 25 MG: 25 TABLET ORAL at 19:52

## 2019-09-25 RX ADMIN — MORPHINE SULFATE 2 MG: 4 INJECTION INTRAVENOUS at 13:59

## 2019-09-25 RX ADMIN — FAMOTIDINE 20 MG: 10 INJECTION INTRAVENOUS at 20:32

## 2019-09-25 ASSESSMENT — COPD QUESTIONNAIRES
DO YOU EVER COUGH UP ANY MUCUS OR PHLEGM?: NO/ONLY WITH OCCASIONAL COLDS OR INFECTIONS
DURING THE PAST 4 WEEKS HOW MUCH DID YOU FEEL SHORT OF BREATH: NONE/LITTLE OF THE TIME
COPD SCREENING SCORE: 2
HAVE YOU SMOKED AT LEAST 100 CIGARETTES IN YOUR ENTIRE LIFE: NO/DON'T KNOW

## 2019-09-25 ASSESSMENT — LIFESTYLE VARIABLES
CONSUMPTION TOTAL: NEGATIVE
EVER_SMOKED: NEVER
DO YOU DRINK ALCOHOL: NO
ALCOHOL_USE: YES
TOTAL SCORE: 0
HOW MANY TIMES IN THE PAST YEAR HAVE YOU HAD 5 OR MORE DRINKS IN A DAY: 0
EVER HAD A DRINK FIRST THING IN THE MORNING TO STEADY YOUR NERVES TO GET RID OF A HANGOVER: NO
ON A TYPICAL DAY WHEN YOU DRINK ALCOHOL HOW MANY DRINKS DO YOU HAVE: 1
AVERAGE NUMBER OF DAYS PER WEEK YOU HAVE A DRINK CONTAINING ALCOHOL: 1
TOTAL SCORE: 0
EVER FELT BAD OR GUILTY ABOUT YOUR DRINKING: NO
HAVE YOU EVER FELT YOU SHOULD CUT DOWN ON YOUR DRINKING: NO
HAVE PEOPLE ANNOYED YOU BY CRITICIZING YOUR DRINKING: NO
TOTAL SCORE: 0

## 2019-09-25 ASSESSMENT — CHA2DS2 SCORE
PRIOR STROKE OR TIA OR THROMBOEMBOLISM: NO
CHA2DS2 VASC SCORE: 4
VASCULAR DISEASE: YES
AGE 65 TO 74: YES
DIABETES: YES
SEX: MALE
AGE 75 OR GREATER: NO
HYPERTENSION: YES
CHF OR LEFT VENTRICULAR DYSFUNCTION: NO

## 2019-09-25 NOTE — ASSESSMENT & PLAN NOTE
-Likely musculoskeletal, with reproducible chest tenderness.  May also be related to gas/GERD.  Given his history, reasonable to pursue further noninvasive cardiac work-up.  He had negative stress test back in April 2019  -I will obtain serial troponins, and an echocardiogram.   -Start empiric aspirin for now until cardiac cause ruled out.  Check lipid profile and hemoglobin A1c for further risk stratification.  -Start as needed sublingual nitroglycerin, and morphine for pain recurrence.   -We will do further cardiac monitoring to rule out arrhythmias.  -For completion, I will obtain a d-dimer, and if elevated will proceed with ruling out PE.  Check urine drug screen.  -For possibility of GI related chest pain, I will start him on IV famotidine, along with PRN GI cocktail, and Maalox.

## 2019-09-25 NOTE — PROGRESS NOTES
Inpatient Anticoagulation Service Note    Date: 9/25/2019    Reason for Anticoagulation: Bioprosthetic Valve Replacement, Atrial Fibrillation, Deep Vein Thrombosis   Target INR: 2.0 to 3.0  BIC1TA5 VASc Score: 4  HAS-BLED Score: 1   Hemoglobin Value: 15.1  Hematocrit Value: 44.6    INR from last 7 days     Date/Time INR Value    09/25/19 1020  (!) 2.26        Dose from last 7 days     Date/Time Dose (mg)    09/25/19 1321  5        Average Dose (mg): 5  Significant Interactions: Aspirin, Statin  Bridge Therapy: No     Reversal Agent Administered: Not Applicable  Comments: 65 yo male admitted for chest pain and continued on warfarin for h/o bioprosthetic mitral valve replacement, paroxysmal afib, and DVT.  At the [facility] Anticoagulation clinic patient's target INR was 2.5-3.5.  I did discuss this with Dr. Moore who agreed the target INR of 2 to 3.  Patient is currently therapeutic on home dose of warfarin 5 mg po qday. ASA was started today as cardiac cause of chest pain is ruled out. All other warfarin-drug interactions are continued from home. H/H are WNL. plts are slightly low.  No s/sx of overt bleeding noted upon admission. Will continue warfarin 5 mg po tonight and repeat INR in AM.     Plan:  Continue warfarin 5 mg po tonight and repeat INR in AM.   Education Material Provided?: No  Pharmacist suggested discharge dosing: Consider resuming home dose of warfarin 5 mg po qday with goal INR 2 to 3.      Ana Madera, PharmD, BCOP

## 2019-09-25 NOTE — ASSESSMENT & PLAN NOTE
-Maintaining excellent blood pressure control.  Resume home dose Lopressor, and benazepril.  Monitor blood pressure trend closely, with as needed IV labetalol, Vasotec for significant hypertension.

## 2019-09-25 NOTE — ED NOTES
Bedside shift change report given to [facility], RN (oncoming nurse) by Maryam Woody RN (offgoing nurse). Report included the following information SBAR, Kardex, Procedure Summary, Intake/Output, MAR, Accordion, Recent Results, Med Rec Status, Cardiac Rhythm SB/SR and Alarm Parameters . erp to bedside

## 2019-09-25 NOTE — DISCHARGE PLANNING
Care Transition Team Assessment    Spoke with patient at bedside. Patient uses [facility] on [address]. Uses cane to ambulate. Unknown ride @ present.     Information Source  Orientation : Oriented x 4  Information Given By: Patient    Readmission Evaluation  Is this a readmission?: No    Interdisciplinary Discharge Planning  Does Admitting Nurse Feel This Could be a Complex Discharge?: No  Primary Care Physician: None  Lives with - Patient's Self Care Capacity: Alone and Able to Care For Self  Support Systems: Family Member(s)  Housing / Facility: 1 Story Apartment / Condo  Do You Take your Prescribed Medications Regularly: Yes  Able to Return to Previous ADL's: Yes  Mobility Issues: Yes  Prior Services: Home-Independent  Patient Expects to be Discharged to:: Home  Assistance Needed: Unknown at this Time  Durable Medical Equipment: Other - Specify(Cane)    Discharge Preparedness  What are your discharge supports?: Other (comment)(Friend)  Prior Functional Level: Uses Cane    Functional Assesment  Prior Functional Level: Uses Cane    Finances  Prescription Coverage: Yes    Anticipated Discharge Information  Anticipated discharge disposition: Home  Discharge Address: [facility] [address]  Discharge Contact Phone Number: [phone]

## 2019-09-25 NOTE — ED NOTES
"Pt reports previous morphine dose \"took the pain down barely a notch, its still killing me\" PT given meds per mar.  Tolerated well, resting at this time. siderails up x2, call light within reach.     "

## 2019-09-25 NOTE — ED NOTES
Pt transport now to room upstairs, concerns addressed to receiving floor nurse. Pt in NAD at time of transfer. Vital signs stable. Belongings with pt.

## 2019-09-25 NOTE — ASSESSMENT & PLAN NOTE
-Currently normal sinus rhythm.  Resume home dose metoprolol, and anticoagulation with Coumadin.  Monitor on telemetry.

## 2019-09-25 NOTE — ASSESSMENT & PLAN NOTE
-I will hold his metformin while in the hospital, and put him on sliding scale insulin coverage. Accu-Cheks before meals and at bedtime. Goal to keep BG between 140-180 per 2019 ADA guidelines.    -Check hemoglobin A1c.

## 2019-09-25 NOTE — ED TRIAGE NOTES
"Pt bib remsa, awoke this am with 7/10 CP and \"feeling of fluttering in my chest\". Pt given 4 zofran and 100mL NS en route.     Pt speaking in full sentences, pain intermittent at this time. A, o x4.  "

## 2019-09-25 NOTE — H&P
Hospital Medicine History & Physical Note    Date of Service  9/25/2019    Primary Care Physician  Pcp Pt States None    Consultants  none    Code Status  Full    Chief Complaint  Chest pain  Left knee pain    History of Presenting Illness  64 y.o. male with paroxysmal atrial fibrillation on chronic anti-correlation, CAD, hypertension, type 2 diabetes mellitus, and history of gout, who presented 9/25/2019 with chest pain, and left knee pain.    Patient states that this morning, after taking a hot shower, when he felt presyncopal like he was going to pass out, followed by sensation of his chest hurting, which he described as heaviness and gassy, rated 5 out of 10 in severity, coming and going, and improved with burping.  The pain was not related or worse with exertion.  It was associated with some shortness of breath, and palpitation.  He had no other complaint such as fevers, chills, cough, diarrhea, abdominal pain, nausea or vomiting.  He had no changes in his chronic leg edema.  He then called his landlord, who then called EMS to bring him to the ED.    Additionally, in the last 3 to 4 days, he started to notice that his left knee has been hurting, with some swelling, but no erythema or redness.  He tried to take Tylenol, but that afforded him very minimal relief.    Review of Systems  ROS     Pertinent positives/negatives as mentioned above.     A complete review of systems was personally done by me. All other systems were negative.       Past Medical History   has a past medical history of Atrial fibrillation (HCC), Bronchitis, CAD (coronary artery disease), Gout, Heart valve disease, Hypertension, Pacemaker, Personal history of venous thrombosis and embolism (2009), PVC (premature ventricular contraction) (4/13/2019), Renal disorder, and Type II or unspecified type diabetes mellitus without mention of complication, not stated as uncontrolled.    Surgical History   has a past surgical history that includes  mitral valve replace (3/14/08); maze procedure (3/14/08); angiogram (7/3/2009); angiogram (7/4/2009); cath removal (7/4/2009); thrombectomy (7/4/2009); embolectomy (7/4/2009); irrigation & debridement general (7/20/2009); wide excision (7/20/2009); other cardiac surgery (2008); other abdominal surgery; mitral valve replace (3/8/2017); and lopez (3/8/2017).     Family History  Reviewed and not pertinent.       Social History   reports that he quit smoking about 38 years ago. He has never used smokeless tobacco. He reports that he does not drink alcohol or use drugs.    Allergies  No Known Allergies    Medications  Prior to Admission Medications   Prescriptions Last Dose Informant Patient Reported? Taking?   acetaminophen (TYLENOL) 500 MG Tab unknown at Unknown time Patient Yes No   Sig: Take 500-1,000 mg by mouth every 6 hours as needed for Moderate Pain.   allopurinol (ZYLOPRIM) 100 MG Tab 9/25/2019 at 0730  No No   Sig: Take 1 Tab by mouth every day for 180 days.   atorvastatin (LIPITOR) 20 MG Tab 9/25/2019 at 0730  No No   Sig: Take 1 Tab by mouth every day for 180 days.   benazepril (LOTENSIN) 10 MG Tab 9/25/2019 at 08234  No No   Sig: Take 1 Tab by mouth every day for 180 days.   metFORMIN (GLUCOPHAGE) 500 MG Tab 9/25/2019 at 0730  No No   Sig: Take 1 Tab by mouth 2 times a day, with meals for 180 days.   metoprolol (LOPRESSOR) 25 MG Tab 9/25/2019 at 0730  No No   Sig: Take 1 Tab by mouth 2 times a day for 180 days.   tamsulosin (FLOMAX) 0.4 MG capsule 9/25/2019 at 0730  No No   Sig: Take 1 Cap by mouth ONE-HALF HOUR AFTER BREAKFAST for 180 days.   warfarin (COUMADIN) 5 MG Tab 9/24/2019 at 1900  No No   Sig: Take 1 Tab by mouth every day for 180 days.      Facility-Administered Medications: None       Physical Exam  Temp:  [35.9 °C (96.7 °F)-36.2 °C (97.1 °F)] 36.2 °C (97.1 °F)  Pulse:  [84-90] 84  Resp:  [12-38] 20  BP: ()/(62-78) 104/62  SpO2:  [93 %-98 %] 96 %    Physical Exam   Constitutional: He is  oriented to person, place, and time. He appears well-developed.   Obese. Body mass index is 38.43 kg/m².   HENT:   Head: Normocephalic and atraumatic.   Mouth/Throat: Oropharynx is clear and moist. No oropharyngeal exudate.   Eyes: Pupils are equal, round, and reactive to light. EOM are normal. Right eye exhibits no discharge. Left eye exhibits no discharge. No scleral icterus.   Neck: Normal range of motion. Neck supple.   Cardiovascular: Normal rate and regular rhythm. Exam reveals no gallop and no friction rub.   No murmur heard.  Pulmonary/Chest: Effort normal. He has no wheezes. He has no rales. He exhibits no tenderness.   Diminished air entry B/L bases, otherwise clear to auscultation   Abdominal: Soft. Bowel sounds are normal. There is no tenderness. There is no rebound and no guarding.   Musculoskeletal: He exhibits edema (trace B/L LE). He exhibits no tenderness.   Minimal swelling, and tenderness on the left knee.  Minimal effusion.  No cellulitic changes.  No redness or warmth.   Lymphadenopathy:     He has no cervical adenopathy.   Neurological: He is alert and oriented to person, place, and time. No cranial nerve deficit.   Skin: Skin is warm and dry. No rash noted. He is not diaphoretic. No erythema.   Psychiatric: He has a normal mood and affect. His behavior is normal. Thought content normal.   Vitals reviewed.      Laboratory:  Recent Labs     09/25/19  1020   WBC 6.6   RBC 4.51*   HEMOGLOBIN 15.1   HEMATOCRIT 44.6   MCV 98.9*   MCH 33.5*   MCHC 33.9   RDW 48.2   PLATELETCT 134*   MPV 10.2     Recent Labs     09/25/19  1020   SODIUM 138   POTASSIUM 4.8   CHLORIDE 106   CO2 23   GLUCOSE 127*   BUN 29*   CREATININE 1.26   CALCIUM 9.2     Recent Labs     09/25/19  1020   ALTSGPT 13   ASTSGOT 16   ALKPHOSPHAT 57   TBILIRUBIN 0.7   GLUCOSE 127*     Recent Labs     09/25/19  1020   INR 2.26*     No results for input(s): NTPROBNP in the last 72 hours.  Recent Labs     09/25/19  1020   TRIGLYCERIDE 143    HDL 23*   LDL 33     Recent Labs     09/25/19  1020   TROPONINT 18       Urinalysis:    No results found     Imaging:  DX-KNEE COMPLETE 4+ LEFT   Final Result      No acute fracture. Mild degenerative changes.      DX-CHEST-PORTABLE (1 VIEW)   Final Result      Stable mild cardiomegaly.      EC-ECHOCARDIOGRAM COMPLETE W/O CONT    (Results Pending)         Assessment/Plan:  I anticipate this patient is appropriate for observation status at this time.    * Pain in the chest- (present on admission)  Assessment & Plan  -Likely musculoskeletal, with reproducible chest tenderness.  May also be related to gas/GERD.  Given his history, reasonable to pursue further noninvasive cardiac work-up.  He had negative stress test back in April 2019  -I will obtain serial troponins, and an echocardiogram.   -Start empiric aspirin for now until cardiac cause ruled out.  Check lipid profile and hemoglobin A1c for further risk stratification.  -Start as needed sublingual nitroglycerin, and morphine for pain recurrence.   -We will do further cardiac monitoring to rule out arrhythmias.  -For completion, I will obtain a d-dimer, and if elevated will proceed with ruling out PE.  Check urine drug screen.  -For possibility of GI related chest pain, I will start him on IV famotidine, along with PRN GI cocktail, and Maalox.    Left knee pain, likely from acute gout- (present on admission)  Assessment & Plan  -Knee x-ray showed no acute fracture.  -I will start him on colchicine 1.2 mg now, followed by 0.6 mg in 1 hour, then 0.6 mg daily.  I will resume his home dose allopurinol.  -PT/OT evaluation.    ESTELA (obstructive sleep apnea)- (present on admission)  Assessment & Plan  -He states his last sleep study was in 2014.  He did try to be on CPAP before, but did not tolerate it.  He likely needs another sleep study done, PCP to arrange.    Obesity (BMI 30-39.9)- (present on admission)  Assessment & Plan  - Body mass index is 38.31 kg/m²..  -  Counseled on weight loss.  - outpatient referral for outpatient weight management.    Type 2 diabetes mellitus with complication, without long-term current use of insulin (HCC)- (present on admission)  Assessment & Plan  -I will hold his metformin while in the hospital, and put him on sliding scale insulin coverage. Accu-Cheks before meals and at bedtime. Goal to keep BG between 140-180 per 2019 ADA guidelines.    -Check hemoglobin A1c.    Mixed hyperlipidemia- (present on admission)  Assessment & Plan  -Resume home dose Lipitor.  Check lipid profile.    Benign prostatic hyperplasia without lower urinary tract symptoms- (present on admission)  Assessment & Plan  -Resume home dose Flomax.    Essential hypertension, benign- (present on admission)  Assessment & Plan  -Maintaining excellent blood pressure control.  Resume home dose Lopressor, and benazepril.  Monitor blood pressure trend closely, with as needed IV labetalol, Vasotec for significant hypertension.    Acquired circulating anticoagulants (HCC)- (present on admission)  Assessment & Plan  -Resume Coumadin per pharmacy dosing.    Paroxysmal atrial fibrillation (HCC)- (present on admission)  Assessment & Plan  -Currently normal sinus rhythm.  Resume home dose metoprolol, and anticoagulation with Coumadin.  Monitor on telemetry.      VTE prophylaxis: coumadin

## 2019-09-25 NOTE — ASSESSMENT & PLAN NOTE
-He states his last sleep study was in 2014.  He did try to be on CPAP before, but did not tolerate it.  He likely needs another sleep study done, PCP to arrange.

## 2019-09-25 NOTE — ASSESSMENT & PLAN NOTE
- Body mass index is 38.31 kg/m²..  - Counseled on weight loss.  - outpatient referral for outpatient weight management.

## 2019-09-25 NOTE — CARE PLAN
Problem: Knowledge Deficit  Goal: Knowledge of disease process/condition, treatment plan, diagnostic tests, and medications will improve  Outcome: PROGRESSING AS EXPECTED

## 2019-09-25 NOTE — PROGRESS NOTES
Assessment completed. Pt A&Ox4. Respirations are even and unlabored on RA. Pt denies chest pain at this time. Reports extreme L knee pain, 9/10-medicated per MAR. Monitors applied, VS stable, call light and belongings within reach. POC updated (labs, echo, urine, PT/OT). Pt educated on room and call light, pt verbalized understanding. Communication board updated. Needs met. Lunch provided.

## 2019-09-25 NOTE — ASSESSMENT & PLAN NOTE
-Knee x-ray showed no acute fracture.  -I will start him on colchicine 1.2 mg now, followed by 0.6 mg in 1 hour, then 0.6 mg daily.  I will resume his home dose allopurinol.  -PT/OT evaluation.

## 2019-09-26 ENCOUNTER — PATIENT OUTREACH (OUTPATIENT)
Dept: HEALTH INFORMATION MANAGEMENT | Facility: OTHER | Age: 64
End: 2019-09-26

## 2019-09-26 ENCOUNTER — APPOINTMENT (OUTPATIENT)
Dept: CARDIOLOGY | Facility: MEDICAL CENTER | Age: 64
End: 2019-09-26
Attending: INTERNAL MEDICINE
Payer: MEDICARE

## 2019-09-26 ENCOUNTER — TELEPHONE (OUTPATIENT)
Dept: VASCULAR LAB | Facility: MEDICAL CENTER | Age: 64
End: 2019-09-26

## 2019-09-26 VITALS
HEIGHT: 70 IN | RESPIRATION RATE: 14 BRPM | WEIGHT: 267.86 LBS | BODY MASS INDEX: 38.35 KG/M2 | OXYGEN SATURATION: 95 % | DIASTOLIC BLOOD PRESSURE: 72 MMHG | SYSTOLIC BLOOD PRESSURE: 109 MMHG | TEMPERATURE: 97.3 F | HEART RATE: 81 BPM

## 2019-09-26 PROBLEM — R07.9 PAIN IN THE CHEST: Status: RESOLVED | Noted: 2019-09-25 | Resolved: 2019-09-26

## 2019-09-26 LAB
ANION GAP SERPL CALC-SCNC: 9 MMOL/L (ref 0–11.9)
BASOPHILS # BLD AUTO: 0.4 % (ref 0–1.8)
BASOPHILS # BLD: 0.04 K/UL (ref 0–0.12)
BUN SERPL-MCNC: 32 MG/DL (ref 8–22)
CALCIUM SERPL-MCNC: 9.1 MG/DL (ref 8.5–10.5)
CHLORIDE SERPL-SCNC: 101 MMOL/L (ref 96–112)
CO2 SERPL-SCNC: 24 MMOL/L (ref 20–33)
CREAT SERPL-MCNC: 1.23 MG/DL (ref 0.5–1.4)
EOSINOPHIL # BLD AUTO: 0.3 K/UL (ref 0–0.51)
EOSINOPHIL NFR BLD: 3.3 % (ref 0–6.9)
ERYTHROCYTE [DISTWIDTH] IN BLOOD BY AUTOMATED COUNT: 49.2 FL (ref 35.9–50)
GLUCOSE BLD-MCNC: 114 MG/DL (ref 65–99)
GLUCOSE BLD-MCNC: 200 MG/DL (ref 65–99)
GLUCOSE SERPL-MCNC: 112 MG/DL (ref 65–99)
HCT VFR BLD AUTO: 44.3 % (ref 42–52)
HGB BLD-MCNC: 14.9 G/DL (ref 14–18)
IMM GRANULOCYTES # BLD AUTO: 0.04 K/UL (ref 0–0.11)
IMM GRANULOCYTES NFR BLD AUTO: 0.4 % (ref 0–0.9)
INR PPP: 2.3 (ref 0.87–1.13)
LV EJECT FRACT  99904: 55
LV EJECT FRACT MOD 2C 99903: 68.12
LV EJECT FRACT MOD 4C 99902: 42.29
LV EJECT FRACT MOD BP 99901: 54.89
LYMPHOCYTES # BLD AUTO: 0.93 K/UL (ref 1–4.8)
LYMPHOCYTES NFR BLD: 10.3 % (ref 22–41)
MCH RBC QN AUTO: 33 PG (ref 27–33)
MCHC RBC AUTO-ENTMCNC: 33.6 G/DL (ref 33.7–35.3)
MCV RBC AUTO: 98.2 FL (ref 81.4–97.8)
MONOCYTES # BLD AUTO: 0.73 K/UL (ref 0–0.85)
MONOCYTES NFR BLD AUTO: 8.1 % (ref 0–13.4)
NEUTROPHILS # BLD AUTO: 6.99 K/UL (ref 1.82–7.42)
NEUTROPHILS NFR BLD: 77.5 % (ref 44–72)
NRBC # BLD AUTO: 0 K/UL
NRBC BLD-RTO: 0 /100 WBC
PLATELET # BLD AUTO: 147 K/UL (ref 164–446)
PMV BLD AUTO: 9.9 FL (ref 9–12.9)
POTASSIUM SERPL-SCNC: 4.5 MMOL/L (ref 3.6–5.5)
PROTHROMBIN TIME: 26.1 SEC (ref 12–14.6)
RBC # BLD AUTO: 4.51 M/UL (ref 4.7–6.1)
SODIUM SERPL-SCNC: 134 MMOL/L (ref 135–145)
TROPONIN T SERPL-MCNC: 17 NG/L (ref 6–19)
WBC # BLD AUTO: 9 K/UL (ref 4.8–10.8)

## 2019-09-26 PROCEDURE — 85025 COMPLETE CBC W/AUTO DIFF WBC: CPT

## 2019-09-26 PROCEDURE — A9270 NON-COVERED ITEM OR SERVICE: HCPCS | Performed by: HOSPITALIST

## 2019-09-26 PROCEDURE — 96376 TX/PRO/DX INJ SAME DRUG ADON: CPT | Mod: XU

## 2019-09-26 PROCEDURE — A9270 NON-COVERED ITEM OR SERVICE: HCPCS | Performed by: INTERNAL MEDICINE

## 2019-09-26 PROCEDURE — 96372 THER/PROPH/DIAG INJ SC/IM: CPT

## 2019-09-26 PROCEDURE — 36415 COLL VENOUS BLD VENIPUNCTURE: CPT

## 2019-09-26 PROCEDURE — 700102 HCHG RX REV CODE 250 W/ 637 OVERRIDE(OP): Performed by: INTERNAL MEDICINE

## 2019-09-26 PROCEDURE — 99217 PR OBSERVATION CARE DISCHARGE: CPT | Performed by: INTERNAL MEDICINE

## 2019-09-26 PROCEDURE — 80048 BASIC METABOLIC PNL TOTAL CA: CPT

## 2019-09-26 PROCEDURE — G0378 HOSPITAL OBSERVATION PER HR: HCPCS

## 2019-09-26 PROCEDURE — 700111 HCHG RX REV CODE 636 W/ 250 OVERRIDE (IP): Performed by: INTERNAL MEDICINE

## 2019-09-26 PROCEDURE — 97165 OT EVAL LOW COMPLEX 30 MIN: CPT

## 2019-09-26 PROCEDURE — 700102 HCHG RX REV CODE 250 W/ 637 OVERRIDE(OP): Performed by: HOSPITALIST

## 2019-09-26 PROCEDURE — 84484 ASSAY OF TROPONIN QUANT: CPT

## 2019-09-26 PROCEDURE — 93306 TTE W/DOPPLER COMPLETE: CPT | Mod: 26 | Performed by: INTERNAL MEDICINE

## 2019-09-26 PROCEDURE — 85610 PROTHROMBIN TIME: CPT

## 2019-09-26 PROCEDURE — 82962 GLUCOSE BLOOD TEST: CPT | Mod: 91

## 2019-09-26 PROCEDURE — 93306 TTE W/DOPPLER COMPLETE: CPT

## 2019-09-26 RX ORDER — COLCHICINE 0.6 MG/1
0.6 TABLET ORAL DAILY
Qty: 5 TAB | Refills: 0 | Status: SHIPPED | OUTPATIENT
Start: 2019-09-27 | End: 2019-09-26

## 2019-09-26 RX ORDER — COLCHICINE 0.6 MG/1
0.6 TABLET ORAL DAILY
Qty: 5 TAB | Refills: 0 | Status: SHIPPED | OUTPATIENT
Start: 2019-09-27 | End: 2019-10-02

## 2019-09-26 RX ADMIN — COLCHICINE 0.6 MG: 0.6 TABLET, FILM COATED ORAL at 06:09

## 2019-09-26 RX ADMIN — OXYCODONE HYDROCHLORIDE 10 MG: 5 TABLET ORAL at 01:04

## 2019-09-26 RX ADMIN — METOPROLOL TARTRATE 25 MG: 25 TABLET, FILM COATED ORAL at 06:08

## 2019-09-26 RX ADMIN — INSULIN HUMAN 1 UNITS: 100 INJECTION, SOLUTION PARENTERAL at 11:14

## 2019-09-26 RX ADMIN — DIPHENHYDRAMINE HCL 25 MG: 25 TABLET ORAL at 01:04

## 2019-09-26 RX ADMIN — OXYCODONE HYDROCHLORIDE 5 MG: 5 TABLET ORAL at 07:44

## 2019-09-26 RX ADMIN — ASPIRIN 81 MG: 81 TABLET, COATED ORAL at 06:07

## 2019-09-26 RX ADMIN — ATORVASTATIN CALCIUM 20 MG: 20 TABLET, FILM COATED ORAL at 06:07

## 2019-09-26 RX ADMIN — FAMOTIDINE 20 MG: 10 INJECTION INTRAVENOUS at 07:44

## 2019-09-26 RX ADMIN — BENAZEPRIL HYDROCHLORIDE 10 MG: 20 TABLET ORAL at 06:10

## 2019-09-26 RX ADMIN — TAMSULOSIN HYDROCHLORIDE 0.4 MG: 0.4 CAPSULE ORAL at 11:14

## 2019-09-26 RX ADMIN — ACETAMINOPHEN 650 MG: 325 TABLET, FILM COATED ORAL at 12:35

## 2019-09-26 RX ADMIN — ALLOPURINOL 100 MG: 100 TABLET ORAL at 06:07

## 2019-09-26 ASSESSMENT — COGNITIVE AND FUNCTIONAL STATUS - GENERAL
DAILY ACTIVITIY SCORE: 24
SUGGESTED CMS G CODE MODIFIER DAILY ACTIVITY: CH

## 2019-09-26 ASSESSMENT — ACTIVITIES OF DAILY LIVING (ADL): TOILETING: INDEPENDENT

## 2019-09-26 NOTE — PROGRESS NOTES
Called and left a message with the ER phlebotomist to come draw a Trop since his IV won't draw blood, and he has had 2 unsuccessful attempts with a butterfly needle.

## 2019-09-26 NOTE — DISCHARGE PLANNING
Agency/Facility Name: [facility]  Spoke To: Randal  Outcome: Unable to provide external transport, they are currently down to one .     This [facility] also contacted [facility], per Ned patient does not have transportation benefits with Medicaid.     RN BRENDA Vasquez notified.

## 2019-09-26 NOTE — PROGRESS NOTES
Patient requesting sleeping pill due to being in the hospital.  Patient states itching all over body, unknown if caused by medication. Rash noted on right cheek. Paged Dr. Moore. New orders.

## 2019-09-26 NOTE — DISCHARGE INSTRUCTIONS
Discharge Instructions    Discharged to home by taxi with escort. Discharged via wheelchair, hospital escort: Yes.  Special equipment needed: Not Applicable    Be sure to schedule a follow-up appointment with your primary care doctor or any specialists as instructed.     Discharge Plan:   Diet Plan: Discussed  Activity Level: Discussed  Confirmed Follow up Appointment: Patient to Call and Schedule Appointment  Confirmed Symptoms Management: Discussed  Medication Reconciliation Updated: Yes  Influenza Vaccine Indication: Patient Refuses    I understand that a diet low in cholesterol, fat, and sodium is recommended for good health. Unless I have been given specific instructions below for another diet, I accept this instruction as my diet prescription.   Other diet: heart healthy     Special Instructions: None    · Is patient discharged on Warfarin / Coumadin?   No     Depression / Suicide Risk    As you are discharged from this [facility] facility, it is important to learn how to keep safe from harming yourself.    Recognize the warning signs:  · Abrupt changes in personality, positive or negative- including increase in energy   · Giving away possessions  · Change in eating patterns- significant weight changes-  positive or negative  · Change in sleeping patterns- unable to sleep or sleeping all the time   · Unwillingness or inability to communicate  · Depression  · Unusual sadness, discouragement and loneliness  · Talk of wanting to die  · Neglect of personal appearance   · Rebelliousness- reckless behavior  · Withdrawal from people/activities they love  · Confusion- inability to concentrate     If you or a loved one observes any of these behaviors or has concerns about self-harm, here's what you can do:  · Talk about it- your feelings and reasons for harming yourself  · Remove any means that you might use to hurt yourself (examples: pills, rope, extension cords, firearm)  · Get professional help from the  community (Mental Health, Substance Abuse, psychological counseling)  · Do not be alone:Call your Safe Contact- someone whom you trust who will be there for you.  · Call your local CRISIS HOTLINE 520-0234 or 986-276-5742  · Call your local Children's Mobile Crisis Response Team [place] (041) 190-1155 or www.[employer]  · Call the toll free National Suicide Prevention Hotlines   · National Suicide Prevention Lifeline 297-191-NSCK (4889)  · National Hope Line Network 800-SUICIDE (396-5265)

## 2019-09-26 NOTE — CARE PLAN
Problem: Knowledge Deficit  Goal: Knowledge of disease process/condition, treatment plan, diagnostic tests, and medications will improve  Outcome: PROGRESSING AS EXPECTED     Problem: Safety  Goal: Will remain free from falls  Outcome: PROGRESSING AS EXPECTED     Problem: Pain Management  Goal: Pain level will decrease to patient's comfort goal  Outcome: PROGRESSING AS EXPECTED

## 2019-09-26 NOTE — DISCHARGE PLANNING
[facility] pharmacy not contracted with patient's medicare plan. RX sent electronically to patient's preferred pharmacy [facility].

## 2019-09-26 NOTE — DISCHARGE SUMMARY
Discharge Summary    CHIEF COMPLAINT ON ADMISSION  Chief Complaint   Patient presents with   • Chest Pain       Reason for Admission  ems     Admission Date  9/25/2019    CODE STATUS  Full Code    HPI & HOSPITAL COURSE  This is a 64 y.o. male here with chest pain, and left knee pain.  Patient has known history of proximal atrial fibrillation on chronic anticoagulation, CAD, hypertension, diabetes and history of gout.  Patient presented stating that after he had taken a hot shower in the morning he felt presyncopal like he was going to pass out followed by some sensation of chest discomfort that he described as heaviness.  Patient states the chest discomfort is resolved after belching.  Patient did state that the pain was accompanied with slight shortness of breath at which time he called his landlord who called EMS to bring him to the emergency room.  Patient also endorses the last 3 to 4 days his left knee has been hurting and swelling and Tylenol has not been controlling his pain.  Patient was admitted to CDU for further work-up and monitoring.  Patient was placed on telemetry monitoring with no acute cardiac events noted.  CBC on admission is essentially benign and noncontributory.  CMP is positive for elevated glucose at 127 and slightly increased BUN at 29.  Troponin remained negative.  Imaging of the knee shows no acute fracture, mild degenerative changes.  Chest x-ray shows stable mild cardiomegaly otherwise no acute cardiopulmonary abnormalities.  Patient had recent negative stress test in April 2019.  Echocardiogram was obtained she has an EF of 55% with LVH with known mitral valve bioprosthesis, with comparison to previous echocardiogram on 3/18/2017 pulmonary artery pressures are now higher.  Patient will need to follow-up with cardiology on discharge.  Patient was initiated on colchicine which has improved his left knee pain.  On exam patient states no further chest pain at this time.  Patient states his  only pain is currently in his knee.  Patient encouraged to follow-up with PCP on discharge.  Patient states he has some difficulty paying for his medications however when sent to pharmacy they were noted to be less than $3 as patient is double insured.  Patient will need to follow-up with his primary care and he states he understands this and is ready for discharge at this time..        Therefore, he is discharged in good and stable condition to home with close outpatient follow-up.        Discharge Date  9/26/2019    FOLLOW UP ITEMS POST DISCHARGE  Please follow up with PCP   Take medications as prescribed     DISCHARGE DIAGNOSES  Principal Problem:    Pain in the chest POA: Yes  Active Problems:    Left knee pain, likely from acute gout POA: Yes    Paroxysmal atrial fibrillation (HCC) POA: Yes    Acquired circulating anticoagulants (HCC) POA: Yes    Essential hypertension, benign POA: Yes    Benign prostatic hyperplasia without lower urinary tract symptoms POA: Yes    Mixed hyperlipidemia POA: Yes    Type 2 diabetes mellitus with complication, without long-term current use of insulin (HCC) POA: Yes    Obesity (BMI 30-39.9) POA: Yes    ESTELA (obstructive sleep apnea) POA: Yes  Resolved Problems:    * No resolved hospital problems. *      FOLLOW UP  Future Appointments   Date Time Provider Department Center   12/5/2019 10:20 AM Adolph Ingram M.D. [facility] None       MEDICATIONS ON DISCHARGE     Medication List      START taking these medications      Instructions   colchicine 0.6 MG Tabs  Start taking on:  9/27/2019  Commonly known as:  COLCRYS   Take 1 Tab by mouth every day for 5 days.  Dose:  0.6 mg        CONTINUE taking these medications      Instructions   acetaminophen 500 MG Tabs  Commonly known as:  TYLENOL   Take 500-1,000 mg by mouth every 6 hours as needed for Moderate Pain.  Dose:  500-1,000 mg     allopurinol 100 MG Tabs  Commonly known as:  ZYLOPRIM   Take 1 Tab by mouth every day for 180 days.  Dose:   100 mg     atorvastatin 20 MG Tabs  Commonly known as:  LIPITOR   Take 1 Tab by mouth every day for 180 days.  Dose:  20 mg     benazepril 10 MG Tabs  Commonly known as:  LOTENSIN   Take 1 Tab by mouth every day for 180 days.  Dose:  10 mg     metFORMIN 500 MG Tabs  Commonly known as:  GLUCOPHAGE   Take 1 Tab by mouth 2 times a day, with meals for 180 days.  Dose:  500 mg     metoprolol 25 MG Tabs  Commonly known as:  LOPRESSOR   Take 1 Tab by mouth 2 times a day for 180 days.  Dose:  25 mg     tamsulosin 0.4 MG capsule  Commonly known as:  FLOMAX   Take 1 Cap by mouth ONE-HALF HOUR AFTER BREAKFAST for 180 days.  Dose:  0.4 mg     warfarin 5 MG Tabs  Commonly known as:  COUMADIN   Take 1 Tab by mouth every day for 180 days.  Dose:  5 mg            Allergies  No Known Allergies    DIET  Orders Placed This Encounter   Procedures   • Diet Order Cardiac     Standing Status:   Standing     Number of Occurrences:   1     Order Specific Question:   Diet:     Answer:   Cardiac [6]     Order Specific Question:   Miscellaneous modifications:     Answer:   No Decaf, No Caffeine(for test) [11]       ACTIVITY  As tolerated.  Weight bearing as tolerated    CONSULTATIONS  None     PROCEDURES  None    LABORATORY  Lab Results   Component Value Date    SODIUM 134 (L) 09/26/2019    POTASSIUM 4.5 09/26/2019    CHLORIDE 101 09/26/2019    CO2 24 09/26/2019    GLUCOSE 112 (H) 09/26/2019    BUN 32 (H) 09/26/2019    CREATININE 1.23 09/26/2019    CREATININE 1.4 04/27/2009        Lab Results   Component Value Date    WBC 9.0 09/26/2019    HEMOGLOBIN 14.9 09/26/2019    HEMATOCRIT 44.3 09/26/2019    PLATELETCT 147 (L) 09/26/2019

## 2019-09-26 NOTE — DISCHARGE PLANNING
Updated by Izzy AKERS that patient states he cannot afford meds till Friday and he has no way home till Friday. Called [facility] on [address] and pat just picked up meds 09/04/2019 90 day supply and co pay is $3.40 for each RX. Called Sabi [facility]ERTS to see if [facility] can transport home this afternoon. Faxed transport form to [phone]. Awaiting call back.

## 2019-09-26 NOTE — DISCHARGE PLANNING
Update. Patients Colchicine @ [facility] pharmacy co pay $0.55. Patient update on co pay and states he no money on him but has it at home. Izzy AKERS updated.

## 2019-09-26 NOTE — DISCHARGE PLANNING
MD requested Medicaid meds to beds. Preferred pharmacy changed to [facility]. Please call [phone] prior to discharge.

## 2019-09-26 NOTE — PROGRESS NOTES
Assessment completed. Pt A&Ox4. Respirations are even and unlabored on RA. Pt reports minimal chest discomfort at this time. States L knee pain is 3/10 and tolerable. Monitors applied, VS stable, call light and belongings within reach. POC updated (PT/OT, pain control). Pt educated on room and call light, pt verbalized understanding. Communication board updated. Needs met.

## 2019-09-26 NOTE — PROGRESS NOTES
IV Dc 'd. Discharge instructions provided to patient. Pt verbalizes understanding. Pt states all questions have been answered. Copy of DC provided to patient. Signed copy in chart. 1 prescription sent to his pharmacy. Pt states all personal belongings are in possession. Pt escorted off unit by tech via w/c to bus stop without incident.

## 2019-09-26 NOTE — PROGRESS NOTES
Report received from OSWALD Magana.  Patient sitting up in bed watching TV after finishing his dinner.  Patient A & O  X 4.  No apparent signs of distress.  Safety precautions in place.  Patient educated to call for assistance.  Will continue to monitor.

## 2019-09-26 NOTE — RESPIRATORY CARE
COPD EDUCATION by COPD CLINICAL EDUCATOR  9/26/2019 at 7:10 AM by Ivonne Leigh     Patient reviewed by COPD education team. Patient does not have a history or diagnosis of COPD and is a non-smoker, therefore does not qualify for the COPD program.

## 2019-09-26 NOTE — CARE PLAN
Problem: Safety  Goal: Will remain free from falls  Note:   Pt educated on safety precautions, utilization of the call light, and bed alarm.  Pt verbalized understanding.      Problem: Pain Management  Goal: Pain level will decrease to patient's comfort goal  Note:   Pt educated on non-pharmacological methods of pain relief as well as informed about medication available in his MAR.

## 2019-09-26 NOTE — DISCHARGE PLANNING
Received call back from [facility], [facility] unable to take patient home. Updated patient and patient states that's ok he has a bus ticket. Izzy AKERS updated.

## 2019-10-09 ENCOUNTER — ANTICOAGULATION MONITORING (OUTPATIENT)
Dept: VASCULAR LAB | Facility: MEDICAL CENTER | Age: 64
End: 2019-10-09

## 2019-10-09 DIAGNOSIS — Z95.2 H/O MITRAL VALVE REPLACEMENT: ICD-10-CM

## 2019-10-09 NOTE — PROGRESS NOTES
[facility]    Pt scheduled for next INR F/U.  Earliest pt could return was 1 week.  I suggested to follow up in 24 hours.    Adolph Arnett, PharmD

## 2019-10-16 ENCOUNTER — ANTICOAGULATION VISIT (OUTPATIENT)
Dept: VASCULAR LAB | Facility: MEDICAL CENTER | Age: 64
End: 2019-10-16
Attending: INTERNAL MEDICINE
Payer: MEDICARE

## 2019-10-16 VITALS
SYSTOLIC BLOOD PRESSURE: 109 MMHG | BODY MASS INDEX: 38.88 KG/M2 | DIASTOLIC BLOOD PRESSURE: 67 MMHG | HEART RATE: 85 BPM | WEIGHT: 271 LBS

## 2019-10-16 DIAGNOSIS — I82.90 DEEP VEIN THROMBOSIS (DVT) OF NON-EXTREMITY VEIN, UNSPECIFIED CHRONICITY: ICD-10-CM

## 2019-10-16 DIAGNOSIS — Z95.2 H/O MITRAL VALVE REPLACEMENT: ICD-10-CM

## 2019-10-16 LAB
INR BLD: 2.4 (ref 0.9–1.2)
INR PPP: 2.4 (ref 2–3.5)

## 2019-10-16 PROCEDURE — 85610 PROTHROMBIN TIME: CPT

## 2019-10-16 PROCEDURE — 99212 OFFICE O/P EST SF 10 MIN: CPT | Performed by: NURSE PRACTITIONER

## 2019-10-16 NOTE — PROGRESS NOTES
Anticoagulation Summary  As of 10/16/2019    INR goal:   2.5-3.5   TTR:   48.3 % (2.5 mo)   INR used for dosin.40! (10/16/2019)   Warfarin maintenance plan:   7 mg (5 mg x 1 and 2 mg x 1) every Wed; 5 mg (5 mg x 1) all other days   Weekly warfarin total:   37 mg   Plan last modified:   Yelena Carreon AHasmukhPPASQUALE (10/16/2019)   Next INR check:   10/30/2019   Target end date:   Indefinite    Indications    H/O mitral valve replacement [Z95.2]  A-fib (HCC) (Resolved) [I48.91]  Deep vein thrombosis (HCC) [I82.409] [I82.409]             Anticoagulation Episode Summary     INR check location:       Preferred lab:       Send INR reminders to:       Comments:         Anticoagulation Care Providers     Provider Role Specialty Phone number    Sumanth Yancey M.D. Referring Cardiology [phone]    [facility] Responsible  [phone]        Anticoagulation Patient Findings      HPI:  Morales Olivier seen in clinic today for follow up on anticoagulation therapy in the presence of MVR, AF, DVT hx.   Hospitalized last month with CP. Reviewed his medications.  Denies any medication or diet changes.   No current symptoms of bleeding or thrombosis reported.    A/P:   INR subtherapeutic. INR trending low. No recent VTEs. CHADS 2 score = 2 (htn, dm).  Will increase regimen.   BP recorded in vitals.    Follow up appointment in 2 week(s).    Next Appointment: Wednesday, Oct 30, 2019 at 10:30 am.    Yelena LOYA

## 2019-10-22 ENCOUNTER — HOSPITAL ENCOUNTER (INPATIENT)
Facility: MEDICAL CENTER | Age: 64
LOS: 1 days | DRG: 554 | End: 2019-10-24
Attending: EMERGENCY MEDICINE | Admitting: INTERNAL MEDICINE
Payer: MEDICARE

## 2019-10-22 ENCOUNTER — APPOINTMENT (OUTPATIENT)
Dept: RADIOLOGY | Facility: MEDICAL CENTER | Age: 64
DRG: 554 | End: 2019-10-22
Attending: STUDENT IN AN ORGANIZED HEALTH CARE EDUCATION/TRAINING PROGRAM
Payer: MEDICARE

## 2019-10-22 ENCOUNTER — PATIENT OUTREACH (OUTPATIENT)
Dept: HEALTH INFORMATION MANAGEMENT | Facility: OTHER | Age: 64
End: 2019-10-22

## 2019-10-22 DIAGNOSIS — N17.9 AKI (ACUTE KIDNEY INJURY) (HCC): ICD-10-CM

## 2019-10-22 DIAGNOSIS — M1A.09X0 CHRONIC GOUT OF MULTIPLE SITES, UNSPECIFIED CAUSE: ICD-10-CM

## 2019-10-22 DIAGNOSIS — M25.562 ACUTE PAIN OF LEFT KNEE: ICD-10-CM

## 2019-10-22 DIAGNOSIS — R74.8 ELEVATED CPK: ICD-10-CM

## 2019-10-22 PROBLEM — E11.9 TYPE 2 DIABETES MELLITUS (HCC): Status: ACTIVE | Noted: 2019-10-22

## 2019-10-22 PROBLEM — E86.0 DEHYDRATION: Status: ACTIVE | Noted: 2019-10-22

## 2019-10-22 PROBLEM — M10.9 GOUT ATTACK: Status: ACTIVE | Noted: 2019-09-25

## 2019-10-22 LAB
ALBUMIN SERPL BCP-MCNC: 4.1 G/DL (ref 3.2–4.9)
ALBUMIN/GLOB SERPL: 1.9 G/DL
ALP SERPL-CCNC: 62 U/L (ref 30–99)
ALT SERPL-CCNC: 18 U/L (ref 2–50)
ANION GAP SERPL CALC-SCNC: 6 MMOL/L (ref 0–11.9)
APPEARANCE UR: CLEAR
AST SERPL-CCNC: 17 U/L (ref 12–45)
BASOPHILS # BLD AUTO: 0.6 % (ref 0–1.8)
BASOPHILS # BLD: 0.05 K/UL (ref 0–0.12)
BILIRUB SERPL-MCNC: 0.7 MG/DL (ref 0.1–1.5)
BILIRUB UR QL STRIP.AUTO: NEGATIVE
BUN SERPL-MCNC: 25 MG/DL (ref 8–22)
CALCIUM SERPL-MCNC: 8.8 MG/DL (ref 8.5–10.5)
CHLORIDE SERPL-SCNC: 106 MMOL/L (ref 96–112)
CK SERPL-CCNC: 212 U/L (ref 0–154)
CO2 SERPL-SCNC: 26 MMOL/L (ref 20–33)
COLOR UR: YELLOW
CREAT SERPL-MCNC: 1.41 MG/DL (ref 0.5–1.4)
CREAT UR-MCNC: 73.4 MG/DL
EKG IMPRESSION: NORMAL
EOSINOPHIL # BLD AUTO: 0.18 K/UL (ref 0–0.51)
EOSINOPHIL NFR BLD: 2.3 % (ref 0–6.9)
ERYTHROCYTE [DISTWIDTH] IN BLOOD BY AUTOMATED COUNT: 51.2 FL (ref 35.9–50)
GLOBULIN SER CALC-MCNC: 2.2 G/DL (ref 1.9–3.5)
GLUCOSE BLD-MCNC: 94 MG/DL (ref 65–99)
GLUCOSE SERPL-MCNC: 122 MG/DL (ref 65–99)
GLUCOSE UR STRIP.AUTO-MCNC: NEGATIVE MG/DL
HCT VFR BLD AUTO: 44.9 % (ref 42–52)
HGB BLD-MCNC: 14.8 G/DL (ref 14–18)
IMM GRANULOCYTES # BLD AUTO: 0.04 K/UL (ref 0–0.11)
IMM GRANULOCYTES NFR BLD AUTO: 0.5 % (ref 0–0.9)
INR PPP: 2.43 (ref 0.87–1.13)
KETONES UR STRIP.AUTO-MCNC: NEGATIVE MG/DL
LEUKOCYTE ESTERASE UR QL STRIP.AUTO: NEGATIVE
LYMPHOCYTES # BLD AUTO: 0.7 K/UL (ref 1–4.8)
LYMPHOCYTES NFR BLD: 9.1 % (ref 22–41)
MAGNESIUM SERPL-MCNC: 2 MG/DL (ref 1.5–2.5)
MCH RBC QN AUTO: 32.5 PG (ref 27–33)
MCHC RBC AUTO-ENTMCNC: 33 G/DL (ref 33.7–35.3)
MCV RBC AUTO: 98.7 FL (ref 81.4–97.8)
MICRO URNS: NORMAL
MONOCYTES # BLD AUTO: 0.58 K/UL (ref 0–0.85)
MONOCYTES NFR BLD AUTO: 7.5 % (ref 0–13.4)
NEUTROPHILS # BLD AUTO: 6.16 K/UL (ref 1.82–7.42)
NEUTROPHILS NFR BLD: 80 % (ref 44–72)
NITRITE UR QL STRIP.AUTO: NEGATIVE
NRBC # BLD AUTO: 0 K/UL
NRBC BLD-RTO: 0 /100 WBC
PH UR STRIP.AUTO: 5.5 [PH] (ref 5–8)
PHOSPHATE SERPL-MCNC: 2.6 MG/DL (ref 2.5–4.5)
PLATELET # BLD AUTO: 132 K/UL (ref 164–446)
PMV BLD AUTO: 10.3 FL (ref 9–12.9)
POTASSIUM SERPL-SCNC: 4.7 MMOL/L (ref 3.6–5.5)
PROT SERPL-MCNC: 6.3 G/DL (ref 6–8.2)
PROT UR QL STRIP: NEGATIVE MG/DL
PROTHROMBIN TIME: 27.2 SEC (ref 12–14.6)
RBC # BLD AUTO: 4.55 M/UL (ref 4.7–6.1)
RBC UR QL AUTO: NEGATIVE
SODIUM SERPL-SCNC: 138 MMOL/L (ref 135–145)
SODIUM UR-SCNC: 76 MMOL/L
SP GR UR STRIP.AUTO: 1.01
UROBILINOGEN UR STRIP.AUTO-MCNC: 0.2 MG/DL
WBC # BLD AUTO: 7.7 K/UL (ref 4.8–10.8)

## 2019-10-22 PROCEDURE — 83735 ASSAY OF MAGNESIUM: CPT

## 2019-10-22 PROCEDURE — 73562 X-RAY EXAM OF KNEE 3: CPT | Mod: LT

## 2019-10-22 PROCEDURE — A9270 NON-COVERED ITEM OR SERVICE: HCPCS | Performed by: STUDENT IN AN ORGANIZED HEALTH CARE EDUCATION/TRAINING PROGRAM

## 2019-10-22 PROCEDURE — 93005 ELECTROCARDIOGRAM TRACING: CPT | Performed by: STUDENT IN AN ORGANIZED HEALTH CARE EDUCATION/TRAINING PROGRAM

## 2019-10-22 PROCEDURE — 85025 COMPLETE CBC W/AUTO DIFF WBC: CPT

## 2019-10-22 PROCEDURE — 700105 HCHG RX REV CODE 258: Performed by: STUDENT IN AN ORGANIZED HEALTH CARE EDUCATION/TRAINING PROGRAM

## 2019-10-22 PROCEDURE — 81003 URINALYSIS AUTO W/O SCOPE: CPT

## 2019-10-22 PROCEDURE — 82570 ASSAY OF URINE CREATININE: CPT

## 2019-10-22 PROCEDURE — 80053 COMPREHEN METABOLIC PANEL: CPT

## 2019-10-22 PROCEDURE — 93970 EXTREMITY STUDY: CPT

## 2019-10-22 PROCEDURE — 700102 HCHG RX REV CODE 250 W/ 637 OVERRIDE(OP): Performed by: STUDENT IN AN ORGANIZED HEALTH CARE EDUCATION/TRAINING PROGRAM

## 2019-10-22 PROCEDURE — 82550 ASSAY OF CK (CPK): CPT

## 2019-10-22 PROCEDURE — 72100 X-RAY EXAM L-S SPINE 2/3 VWS: CPT

## 2019-10-22 PROCEDURE — 84100 ASSAY OF PHOSPHORUS: CPT

## 2019-10-22 PROCEDURE — 85610 PROTHROMBIN TIME: CPT

## 2019-10-22 PROCEDURE — 700102 HCHG RX REV CODE 250 W/ 637 OVERRIDE(OP): Performed by: EMERGENCY MEDICINE

## 2019-10-22 PROCEDURE — A9270 NON-COVERED ITEM OR SERVICE: HCPCS | Performed by: EMERGENCY MEDICINE

## 2019-10-22 PROCEDURE — 82962 GLUCOSE BLOOD TEST: CPT

## 2019-10-22 PROCEDURE — 84300 ASSAY OF URINE SODIUM: CPT

## 2019-10-22 PROCEDURE — 99285 EMERGENCY DEPT VISIT HI MDM: CPT

## 2019-10-22 PROCEDURE — G0378 HOSPITAL OBSERVATION PER HR: HCPCS

## 2019-10-22 PROCEDURE — 99219 PR INITIAL OBSERVATION CARE,LEVL II: CPT | Mod: GC | Performed by: INTERNAL MEDICINE

## 2019-10-22 RX ORDER — SODIUM CHLORIDE 9 MG/ML
INJECTION, SOLUTION INTRAVENOUS CONTINUOUS
Status: DISCONTINUED | OUTPATIENT
Start: 2019-10-22 | End: 2019-10-24

## 2019-10-22 RX ORDER — ALLOPURINOL 100 MG/1
100 TABLET ORAL DAILY
Status: DISCONTINUED | OUTPATIENT
Start: 2019-10-23 | End: 2019-10-24 | Stop reason: HOSPADM

## 2019-10-22 RX ORDER — BISACODYL 10 MG
10 SUPPOSITORY, RECTAL RECTAL
Status: DISCONTINUED | OUTPATIENT
Start: 2019-10-22 | End: 2019-10-24 | Stop reason: HOSPADM

## 2019-10-22 RX ORDER — ATORVASTATIN CALCIUM 20 MG/1
20 TABLET, FILM COATED ORAL DAILY
Status: DISCONTINUED | OUTPATIENT
Start: 2019-10-23 | End: 2019-10-24 | Stop reason: HOSPADM

## 2019-10-22 RX ORDER — POLYETHYLENE GLYCOL 3350 17 G/17G
1 POWDER, FOR SOLUTION ORAL
Status: DISCONTINUED | OUTPATIENT
Start: 2019-10-22 | End: 2019-10-24 | Stop reason: HOSPADM

## 2019-10-22 RX ORDER — COLCHICINE 0.6 MG/1
1.2 TABLET ORAL ONCE
Status: COMPLETED | OUTPATIENT
Start: 2019-10-22 | End: 2019-10-22

## 2019-10-22 RX ORDER — OMEPRAZOLE 20 MG/1
20 CAPSULE, DELAYED RELEASE ORAL DAILY
Status: DISCONTINUED | OUTPATIENT
Start: 2019-10-22 | End: 2019-10-24 | Stop reason: HOSPADM

## 2019-10-22 RX ORDER — TRAZODONE HYDROCHLORIDE 50 MG/1
50 TABLET ORAL
Status: DISCONTINUED | OUTPATIENT
Start: 2019-10-22 | End: 2019-10-24 | Stop reason: HOSPADM

## 2019-10-22 RX ORDER — AMOXICILLIN 250 MG
2 CAPSULE ORAL 2 TIMES DAILY
Status: DISCONTINUED | OUTPATIENT
Start: 2019-10-22 | End: 2019-10-24 | Stop reason: HOSPADM

## 2019-10-22 RX ORDER — HYDROCODONE BITARTRATE AND ACETAMINOPHEN 5; 325 MG/1; MG/1
1 TABLET ORAL ONCE
Status: COMPLETED | OUTPATIENT
Start: 2019-10-22 | End: 2019-10-22

## 2019-10-22 RX ORDER — PROCHLORPERAZINE MALEATE 10 MG
5 TABLET ORAL EVERY 6 HOURS PRN
Status: DISCONTINUED | OUTPATIENT
Start: 2019-10-22 | End: 2019-10-24 | Stop reason: HOSPADM

## 2019-10-22 RX ORDER — TRAMADOL HYDROCHLORIDE 50 MG/1
50 TABLET ORAL EVERY 4 HOURS PRN
Status: DISCONTINUED | OUTPATIENT
Start: 2019-10-22 | End: 2019-10-24 | Stop reason: HOSPADM

## 2019-10-22 RX ORDER — TAMSULOSIN HYDROCHLORIDE 0.4 MG/1
0.4 CAPSULE ORAL
Status: DISCONTINUED | OUTPATIENT
Start: 2019-10-23 | End: 2019-10-24 | Stop reason: HOSPADM

## 2019-10-22 RX ORDER — SODIUM CHLORIDE 9 MG/ML
1000 INJECTION, SOLUTION INTRAVENOUS ONCE
Status: COMPLETED | OUTPATIENT
Start: 2019-10-22 | End: 2019-10-22

## 2019-10-22 RX ORDER — LISINOPRIL 2.5 MG/1
5 TABLET ORAL
Status: DISCONTINUED | OUTPATIENT
Start: 2019-10-23 | End: 2019-10-24 | Stop reason: HOSPADM

## 2019-10-22 RX ORDER — WARFARIN SODIUM 5 MG/1
5 TABLET ORAL EVERY EVENING
COMMUNITY
End: 2020-04-02 | Stop reason: SDUPTHER

## 2019-10-22 RX ORDER — ACETAMINOPHEN 325 MG/1
650 TABLET ORAL EVERY 6 HOURS PRN
Status: DISCONTINUED | OUTPATIENT
Start: 2019-10-22 | End: 2019-10-24 | Stop reason: HOSPADM

## 2019-10-22 RX ORDER — WARFARIN SODIUM 5 MG/1
5 TABLET ORAL DAILY
Status: DISCONTINUED | OUTPATIENT
Start: 2019-10-23 | End: 2019-10-22

## 2019-10-22 RX ORDER — COLCHICINE 0.6 MG/1
0.6 TABLET ORAL DAILY
Status: DISCONTINUED | OUTPATIENT
Start: 2019-10-22 | End: 2019-10-24 | Stop reason: HOSPADM

## 2019-10-22 RX ORDER — WARFARIN SODIUM 5 MG/1
5-7 TABLET ORAL
Status: DISCONTINUED | OUTPATIENT
Start: 2019-10-22 | End: 2019-10-22

## 2019-10-22 RX ORDER — WARFARIN SODIUM 5 MG/1
5 TABLET ORAL
Status: DISCONTINUED | OUTPATIENT
Start: 2019-10-22 | End: 2019-10-24

## 2019-10-22 RX ADMIN — WARFARIN SODIUM 5 MG: 5 TABLET ORAL at 20:16

## 2019-10-22 RX ADMIN — TRAMADOL HYDROCHLORIDE 50 MG: 50 TABLET ORAL at 17:33

## 2019-10-22 RX ADMIN — SENNOSIDES AND DOCUSATE SODIUM 2 TABLET: 8.6; 5 TABLET ORAL at 17:14

## 2019-10-22 RX ADMIN — OMEPRAZOLE 20 MG: 20 CAPSULE, DELAYED RELEASE ORAL at 17:14

## 2019-10-22 RX ADMIN — TRAMADOL HYDROCHLORIDE 50 MG: 50 TABLET ORAL at 22:45

## 2019-10-22 RX ADMIN — SODIUM CHLORIDE 1000 ML: 9 INJECTION, SOLUTION INTRAVENOUS at 11:48

## 2019-10-22 RX ADMIN — METOPROLOL TARTRATE 25 MG: 25 TABLET, FILM COATED ORAL at 17:14

## 2019-10-22 RX ADMIN — SODIUM CHLORIDE: 9 INJECTION, SOLUTION INTRAVENOUS at 20:25

## 2019-10-22 RX ADMIN — COLCHICINE 0.6 MG: 0.6 TABLET, FILM COATED ORAL at 17:14

## 2019-10-22 RX ADMIN — HYDROCODONE BITARTRATE AND ACETAMINOPHEN 1 TABLET: 5; 325 TABLET ORAL at 10:30

## 2019-10-22 RX ADMIN — TRAZODONE HYDROCHLORIDE 50 MG: 50 TABLET ORAL at 20:16

## 2019-10-22 RX ADMIN — COLCHICINE 1.2 MG: 0.6 TABLET, FILM COATED ORAL at 12:15

## 2019-10-22 ASSESSMENT — LIFESTYLE VARIABLES
HOW MANY TIMES IN THE PAST YEAR HAVE YOU HAD 5 OR MORE DRINKS IN A DAY: 0
EVER FELT BAD OR GUILTY ABOUT YOUR DRINKING: NO
EVER HAD A DRINK FIRST THING IN THE MORNING TO STEADY YOUR NERVES TO GET RID OF A HANGOVER: NO
ON A TYPICAL DAY WHEN YOU DRINK ALCOHOL HOW MANY DRINKS DO YOU HAVE: 0
TOTAL SCORE: 0
HAVE YOU EVER FELT YOU SHOULD CUT DOWN ON YOUR DRINKING: NO
EVER HAD A DRINK FIRST THING IN THE MORNING TO STEADY YOUR NERVES TO GET RID OF A HANGOVER: NO
ALCOHOL_USE: NO
TOTAL SCORE: 0
HAVE PEOPLE ANNOYED YOU BY CRITICIZING YOUR DRINKING: NO
CONSUMPTION TOTAL: INCOMPLETE
EVER_SMOKED: NEVER
HAVE YOU EVER FELT YOU SHOULD CUT DOWN ON YOUR DRINKING: NO
CONSUMPTION TOTAL: NEGATIVE
AVERAGE NUMBER OF DAYS PER WEEK YOU HAVE A DRINK CONTAINING ALCOHOL: 0
ON A TYPICAL DAY WHEN YOU DRINK ALCOHOL HOW MANY DRINKS DO YOU HAVE: 0
TOTAL SCORE: 0
ALCOHOL_USE: NO
EVER FELT BAD OR GUILTY ABOUT YOUR DRINKING: NO
HAVE PEOPLE ANNOYED YOU BY CRITICIZING YOUR DRINKING: NO

## 2019-10-22 ASSESSMENT — COGNITIVE AND FUNCTIONAL STATUS - GENERAL
CLIMB 3 TO 5 STEPS WITH RAILING: A LOT
WALKING IN HOSPITAL ROOM: A LOT
MOBILITY SCORE: 14
PERSONAL GROOMING: A LITTLE
MOVING FROM LYING ON BACK TO SITTING ON SIDE OF FLAT BED: A LITTLE
STANDING UP FROM CHAIR USING ARMS: A LOT
HELP NEEDED FOR BATHING: A LITTLE
TURNING FROM BACK TO SIDE WHILE IN FLAT BAD: A LITTLE
MOVING TO AND FROM BED TO CHAIR: A LOT
SUGGESTED CMS G CODE MODIFIER MOBILITY: CL
SUGGESTED CMS G CODE MODIFIER DAILY ACTIVITY: CJ
DAILY ACTIVITIY SCORE: 20
DRESSING REGULAR LOWER BODY CLOTHING: A LOT

## 2019-10-22 ASSESSMENT — ENCOUNTER SYMPTOMS
HEARTBURN: 0
EYE PAIN: 0
MEMORY LOSS: 0
CHILLS: 0
DIARRHEA: 0
COUGH: 0
VOMITING: 0
LOSS OF CONSCIOUSNESS: 0
PALPITATIONS: 0
MYALGIAS: 0
NERVOUS/ANXIOUS: 0
SEIZURES: 0
NAUSEA: 0
CONSTIPATION: 0
SORE THROAT: 0
DIZZINESS: 0
HEMOPTYSIS: 0
HEADACHES: 0
PHOTOPHOBIA: 0
BLURRED VISION: 0
CLAUDICATION: 0
FEVER: 0

## 2019-10-22 ASSESSMENT — CHA2DS2 SCORE
PRIOR STROKE OR TIA OR THROMBOEMBOLISM: NO
AGE 75 OR GREATER: NO
CHA2DS2 VASC SCORE: 3
SEX: MALE
CHF OR LEFT VENTRICULAR DYSFUNCTION: NO
HYPERTENSION: YES
DIABETES: YES
AGE 65 TO 74: YES
VASCULAR DISEASE: NO

## 2019-10-22 NOTE — ASSESSMENT & PLAN NOTE
Patient was diagnosed with Gout several years ago and is on allopurinol, He has had one flare up last month for which he was treated with colchicine.  Knee pain improved after starting colchcine. PT/OT evaluation done while inpatient, no PT needs but OT home health will follow patient on discharge    Plan:  - Colchicine 0.6mg daily for 5 days  - Increase allopurinol  to 300 on discharge  - home health for OT on discharge today

## 2019-10-22 NOTE — H&P
Internal Medicine Admitting History and Physical    Note Author: Kristie Liu M.D.       Name Morales Olivier     1955   Age/Sex 64 y.o. male   MRN [id number]   Code Status Full code     After 5PM or if no immediate response to page, please call for cross-coverage  Attending/Team: /Errol See Patient List for primary contact information  Call [phone] to page    1st Call - Day Intern (R1):    2nd Call - Day Sr. Resident (R2/R3):          Chief Complaint:    Left knee pain    HPI:  64 y.o male with pmhx atrial fibrillation, CAD, type 2 DM, gout  presented today with complaints of left knee pain.   He was diagnosed with Gout several years ago and is on allopurinol 100 mg daily at home. He was hospitalized on 19 for chest pain and left knee pain and was treated with colchicine for 5 days on discharge following which his pain resolved. He was symptom free for 2 weeks but after which he started having mild pain which worsened over last night. It was 10/10 this morning but currently 3/10. This is associated with mild left calf pain. He states that he is unable to bear weight on his left leg due to pain. Denies any history of recent trauma or long distance travel. He states that he has occasional low back pain when he bends over to wash dishes, associated with occasional numbness in his lower extremity. But doesn't have any now. Denies headaches, changes in vision,chest pain, palpitations, falls, fever,alcohol consumption,changes in bowel and bladder habits, abdominal pain, seizures. States he gained 10 lbs in one month but eats just one meal a day but drinks 3 glasses of 16 oz water daily.   Labs:  Wbc 7.7, hB 14.8,blood glucose 122,bun 25, creatinine 1.45(baseline 1.2), . Ultrasound doppler lower extremity was negative for DVT.   In ER patient received IV bolus and 1.2 mg of colchicine.              Review of Systems   Constitutional: Negative for  chills, fever and malaise/fatigue.   HENT: Negative for ear pain, hearing loss and sore throat.    Eyes: Negative for blurred vision, photophobia and pain.   Respiratory: Negative for cough and hemoptysis.    Cardiovascular: Negative for chest pain, palpitations, claudication and leg swelling.   Gastrointestinal: Negative for constipation, diarrhea, heartburn, nausea and vomiting.   Genitourinary: Negative for dysuria and hematuria.   Musculoskeletal: Positive for joint pain. Negative for myalgias.   Skin: Negative for itching and rash.   Neurological: Negative for dizziness, seizures, loss of consciousness and headaches.   Psychiatric/Behavioral: Negative for memory loss. The patient is not nervous/anxious.              Past Medical History (Chronic medical problem, known complications and current treatment)    Atrial fibrillation on metoprolol and warfarin  CAD with pacemaker  Type 2 DM on metformin  Gout on allopurinol 100 mg daily    Past Surgical History:  Past Surgical History:   Procedure Laterality Date   • MITRAL VALVE REPLACE  3/8/2017    Procedure: MITRAL VALVE REPLACE-REDO;  Surgeon: Cornelia Wright M.D.;  Location: [facility];  Service:    • KD  3/8/2017    Procedure: KD;  Surgeon: Cornelia Wright M.D.;  Location: [facility];  Service:    • IRRIGATION & DEBRIDEMENT GENERAL  7/20/2009    Performed by MICHEL URBINA at [facility]   • WIDE EXCISION  7/20/2009    Performed by MICHEL URBINA at [facility]   • ANGIOGRAM  7/4/2009    Performed by MICHEL URBINA at [facility]   • CATH REMOVAL  7/4/2009    Performed by MICHEL URBINA at [facility]   • THROMBECTOMY  7/4/2009    Performed by MICHEL URBINA at [facility]   • EMBOLECTOMY  7/4/2009    Performed by MICHEL URBINA at [facility]   • ANGIOGRAM  7/3/2009    Performed by MORGAN WARD at [facility]   • MITRAL VALVE  REPLACE  3/14/08    Performed by JEANA MARTELL at SURGERY [facility]   • MAZE PROCEDURE  3/14/08    Performed by JEANA MARTELL at SURGERY [facility]   • OTHER CARDIAC SURGERY  2008    mitral valve replacement   • OTHER ABDOMINAL SURGERY      imbulica repair        Current Outpatient Medications:  Home Medications     Reviewed by Richie Marino (Pharmacy Tech) on 10/22/19 at 1248  Med List Status: Complete   Medication Last Dose Status   acetaminophen (TYLENOL) 500 MG Tab unknown Active   allopurinol (ZYLOPRIM) 100 MG Tab 10/22/2019 Active   atorvastatin (LIPITOR) 20 MG Tab 10/22/2019 Active   benazepril (LOTENSIN) 10 MG Tab 10/22/2019 Active   metFORMIN (GLUCOPHAGE) 500 MG Tab 10/22/2019 Active   metoprolol (LOPRESSOR) 25 MG Tab 10/22/2019 Active   tamsulosin (FLOMAX) 0.4 MG capsule 10/22/2019 Active   warfarin (COUMADIN) 5 MG Tab 10/21/2019 Active                Medication Allergy/Sensitivities:  No Known Allergies      Family History (mandatory)   Family History   Problem Relation Age of Onset   • Heart Disease Neg Hx        Social History (mandatory)   Social History     Socioeconomic History   • Marital status: Single     Spouse name: Not on file   • Number of children: Not on file   • Years of education: Not on file   • Highest education level: Not on file   Occupational History   • Not on file   Social Needs   • Financial resource strain: Not on file   • Food insecurity:     Worry: Not on file     Inability: Not on file   • Transportation needs:     Medical: Not on file     Non-medical: Not on file   Tobacco Use   • Smoking status: Former Smoker     Last attempt to quit: 1981     Years since quittin.8   • Smokeless tobacco: Never Used   Substance and Sexual Activity   • Alcohol use: No   • Drug use: No   • Sexual activity: Not on file   Lifestyle   • Physical activity:     Days per week: Not on file     Minutes per session: Not on file   • Stress: Not on file   Relationships   •  Social connections:     Talks on phone: Not on file     Gets together: Not on file     Attends Nondenominational service: Not on file     Active member of club or organization: Not on file     Attends meetings of clubs or organizations: Not on file     Relationship status: Not on file   • Intimate partner violence:     Fear of current or ex partner: Not on file     Emotionally abused: Not on file     Physically abused: Not on file     Forced sexual activity: Not on file   Other Topics Concern   • Not on file   Social History Narrative   • Not on file     Living situation: Lives alone in a senior living community apartment  PCP : Pcp Pt States None    Physical Exam     Vitals:    10/22/19 1200 10/22/19 1320 10/22/19 1503 10/22/19 1530   BP: 115/73 124/68 111/96 (!) 99/73   Pulse:  85     Resp:  18     Temp:       TempSrc:       Weight:       Height:         Body mass index is 38.88 kg/m².  O2 therapy:      Physical Exam   Constitutional: He is oriented to person, place, and time and well-developed, well-nourished, and in no distress. No distress.   HENT:   Head: Normocephalic and atraumatic.   Eyes: Pupils are equal, round, and reactive to light. Conjunctivae and EOM are normal. No scleral icterus.   Neck: Normal range of motion. Neck supple. No thyromegaly present.   Cardiovascular: Normal rate and normal heart sounds.   No murmur heard.  Pulmonary/Chest: Effort normal and breath sounds normal. He has no wheezes. He has no rales.   Abdominal: Soft. Bowel sounds are normal. There is no tenderness.   Abdominal hernia noted above umblicus   Musculoskeletal:   Left knee tenderness, with slight erythema over tibial tuberosity. Not warm to touch.  ROM limited with flexion on left side.  Right foot, bony protrusion near great toe, non tender  +1 pedal edema noted.  Negative straight leg raise test bilaterally   Neurological: He is alert and oriented to person, place, and time. No cranial nerve deficit. He exhibits normal muscle  tone.   Skin: No rash noted. He is not diaphoretic. No erythema.   Psychiatric: Mood, memory and affect normal.         Data Review       Old Records Request:   Completed  Current Records review/summary: Completed    Lab Data Review:  Recent Results (from the past 24 hour(s))   EKG    Collection Time: 10/22/19 10:14 AM   Result Value Ref Range    Report       [facility] Emergency Dept.    Test Date:  2019-10-22  Pt Name:    AILYN MCINTOSH                 Department: ER  MRN:        [id number]                      Room:        13  Gender:     Male                         Technician: [id number]  :        1955                   Requested By:KATHERYN WHELAN ELIDIA  Order #:    [id number]                    Reading MD: MONI JEFFERY MD    Measurements  Intervals                                Axis  Rate:       81                           P:  WI:                                      QRS:        67  QRSD:       98                           T:  QT:         407  QTc:        473    Interpretive Statements  Afib/flut and V-paced complexes  No further rhythm analysis attempted due to paced rhythm  Borderline repolarization abnormality  Borderline prolonged QT interval  Compared to ECG 2019 10:10:41  Sinus rhythm no longer present    Electronically Signed On 10- 10:18:33 PDT by MONI JEFFERY MD     CBC WITH DIFFERENTIAL    Collection Time: 10/22/19 10:34 AM   Result Value Ref Range    WBC 7.7 4.8 - 10.8 K/uL    RBC 4.55 (L) 4.70 - 6.10 M/uL    Hemoglobin 14.8 14.0 - 18.0 g/dL    Hematocrit 44.9 42.0 - 52.0 %    MCV 98.7 (H) 81.4 - 97.8 fL    MCH 32.5 27.0 - 33.0 pg    MCHC 33.0 (L) 33.7 - 35.3 g/dL    RDW 51.2 (H) 35.9 - 50.0 fL    Platelet Count 132 (L) 164 - 446 K/uL    MPV 10.3 9.0 - 12.9 fL    Neutrophils-Polys 80.00 (H) 44.00 - 72.00 %    Lymphocytes 9.10 (L) 22.00 - 41.00 %    Monocytes 7.50 0.00 - 13.40 %    Eosinophils 2.30 0.00 - 6.90 %    Basophils 0.60 0.00 - 1.80 %    Immature  Granulocytes 0.50 0.00 - 0.90 %    Nucleated RBC 0.00 /100 WBC    Neutrophils (Absolute) 6.16 1.82 - 7.42 K/uL    Lymphs (Absolute) 0.70 (L) 1.00 - 4.80 K/uL    Monos (Absolute) 0.58 0.00 - 0.85 K/uL    Eos (Absolute) 0.18 0.00 - 0.51 K/uL    Baso (Absolute) 0.05 0.00 - 0.12 K/uL    Immature Granulocytes (abs) 0.04 0.00 - 0.11 K/uL    NRBC (Absolute) 0.00 K/uL   Comp Metabolic Panel    Collection Time: 10/22/19 10:34 AM   Result Value Ref Range    Sodium 138 135 - 145 mmol/L    Potassium 4.7 3.6 - 5.5 mmol/L    Chloride 106 96 - 112 mmol/L    Co2 26 20 - 33 mmol/L    Anion Gap 6.0 0.0 - 11.9    Glucose 122 (H) 65 - 99 mg/dL    Bun 25 (H) 8 - 22 mg/dL    Creatinine 1.41 (H) 0.50 - 1.40 mg/dL    Calcium 8.8 8.5 - 10.5 mg/dL    AST(SGOT) 17 12 - 45 U/L    ALT(SGPT) 18 2 - 50 U/L    Alkaline Phosphatase 62 30 - 99 U/L    Total Bilirubin 0.7 0.1 - 1.5 mg/dL    Albumin 4.1 3.2 - 4.9 g/dL    Total Protein 6.3 6.0 - 8.2 g/dL    Globulin 2.2 1.9 - 3.5 g/dL    A-G Ratio 1.9 g/dL   CREATINE KINASE    Collection Time: 10/22/19 10:34 AM   Result Value Ref Range    CPK Total 212 (H) 0 - 154 U/L   MAGNESIUM    Collection Time: 10/22/19 10:34 AM   Result Value Ref Range    Magnesium 2.0 1.5 - 2.5 mg/dL   PHOSPHORUS    Collection Time: 10/22/19 10:34 AM   Result Value Ref Range    Phosphorus 2.6 2.5 - 4.5 mg/dL   Prothrombin Time    Collection Time: 10/22/19 10:34 AM   Result Value Ref Range    PT 27.2 (H) 12.0 - 14.6 sec    INR 2.43 (H) 0.87 - 1.13   ESTIMATED GFR    Collection Time: 10/22/19 10:34 AM   Result Value Ref Range    GFR If African American >60 >60 mL/min/1.73 m 2    GFR If Non African American 50 (A) >60 mL/min/1.73 m 2       Imaging/Procedures Review:    Independant Imaging Review: Completed  US-EXTREMITY VENOUS LOWER BILAT   Final Result      DX-KNEE 3 VIEWS LEFT    (Results Pending)   DX-LUMBAR SPINE-2 OR 3 VIEWS    (Results Pending)          EKG:   EKG Independent Review: Completed  QTc:437, HR: 81,  no ST/T  changes    Records reviewed and summarized in current documentation :  No  R teaching service handout given to patient:  No         Assessment/Plan     * Gout attack- (present on admission)  Assessment & Plan  Patient was diagnosed with Gout several years ago and is on allopurinol, He has had one flare up last month for which he was treated with colchicine. Currently severe pain overnight with ROM limitation of left knee.  Minimal erythema, no swelling.Tophi noted on right great toe, non tender.  Possible another gout flare  Plan:  - observation admit  - Colchicine 1.8 mg total day 1 and then 0.6 mg daily  - continue allopurinol 100 daily, will increase to 300 on discharge  - will consider starting PPI incase steroids need to be started tomorrow   - will monitor uric acid level  - standing Xray left knee    Dehydration  Assessment & Plan  Patient had slightly elevated creatinine at 1.4, baseline is 1.2, BUN 25. Does not meet criteria for EBEN  Possible secondary to dehydration.  Plan:  - IV fluids  - monitor renal function      Type 2 diabetes mellitus (HCC)  Assessment & Plan  Patient has h/o Type 2 diabetes mellitus, no symptoms of diabetic retinopathy or neuropathy  HbA1c one month ago was 7.6, Blood sugars 122 today.  Plan;  - ISS  - hypoglycemia protocol  - accu check  - add low dose lisinopril     Paroxysmal atrial fibrillation (HCC)- (present on admission)  Assessment & Plan  Patient has h/o atrial fibrillation rate controlled on metoprolol and gets warfarin for anticoagulation. INR 2.4.  EKG atrial fibrillation/Flutter, rate 81. No Qtc prolongation.  Plan:  - continue metoprolol  - pharmacy to dose warfarin      Obesity (BMI 30-39.9)- (present on admission)  Assessment & Plan  Patient with BMI 38.8, state she put on 10 lbs in last one month.  Although he eating habits remained same he put on weight.  - would benefit from nutrition diet education  -ordered TSH, lipid panel        Anticipated Hospital stay:  Observation admit        Quality Measures  Quality-Core Measures   Reviewed items::  Labs reviewed, Medications reviewed and EKG reviewed  Fragoso catheter::  No Fragoso  DVT prophylaxis pharmacological::  Warfarin (Coumadin)    PCP: Pcp Pt States None

## 2019-10-22 NOTE — PROGRESS NOTES
Paged MD Bernal to clarify if x-ray orders should be stat, per ED tech report that MD had told her x-rays to be done immediately.     Received call back; xrays to be made stat.

## 2019-10-22 NOTE — ED PROVIDER NOTES
ED Provider Note    CHIEF COMPLAINT  Chief Complaint   Patient presents with   • Gout     left leg pain   • Medication Refill       HPI  Morales Olivier is a 64 y.o. With paroxysmal atrial fibrillation on warfarin, CAD with a pacemaker, hypertension, type 2 diabetes mellitus, and history of gout, who presents on 10/22/19 with worsening left knee pain. He was just hospitalized on 9/25/19 for chest pain / ACS rule out and gouty left knee flair. Patient was started on 1.2 colchicine followed by 0.6 mg 1 hour later and 5 days of colchicine on discharge. Patient states that after discharge he felt better for about 2 weeks but then the symptoms began again. Acutely, he said that overnight his left knee pain became so severe that he needed to come in. He states he is compliant with his medications, taking his allopurinol 100 mg daily. He was recently started on atorvastatin, metformin, and lisinopril and states that he generally does not feel well on those medications as they are 'making everything worse.' He denies any recent traumas to the leg. He does endorse a hx of DVTs.     He denies shortness of breath, chest pain, nausea / vomiting, diarrhea / constipation. He describes general malaise and believes it is due to the new medications.     Patient denies any alcohol use. No recent changes in diet.       ALLERGIES  No Known Allergies    CURRENT MEDICATIONS  Home Medications     Reviewed by Richie Marino (Pharmacy Tech) on 10/22/19 at 1248  Med List Status: Complete   Medication Last Dose Status   acetaminophen (TYLENOL) 500 MG Tab unknown Active   allopurinol (ZYLOPRIM) 100 MG Tab 10/22/2019 Active   atorvastatin (LIPITOR) 20 MG Tab 10/22/2019 Active   benazepril (LOTENSIN) 10 MG Tab 10/22/2019 Active   metFORMIN (GLUCOPHAGE) 500 MG Tab 10/22/2019 Active   metoprolol (LOPRESSOR) 25 MG Tab 10/22/2019 Active   tamsulosin (FLOMAX) 0.4 MG capsule 10/22/2019 Active   warfarin (COUMADIN) 5 MG Tab 10/21/2019 Active     "            PAST MEDICAL HISTORY   has a past medical history of Atrial fibrillation (HCC), Bronchitis, CAD (coronary artery disease), Gout, Heart valve disease, Hypertension, Pacemaker, Personal history of venous thrombosis and embolism (), PVC (premature ventricular contraction) (2019), Renal disorder, and Type II or unspecified type diabetes mellitus without mention of complication, not stated as uncontrolled.    SURGICAL HISTORY   has a past surgical history that includes mitral valve replace (3/14/08); maze procedure (3/14/08); angiogram (7/3/2009); angiogram (2009); cath removal (2009); thrombectomy (2009); embolectomy (2009); irrigation & debridement general (2009); wide excision (2009); other cardiac surgery (); other abdominal surgery; mitral valve replace (3/8/2017); and lopez (3/8/2017).    SOCIAL HISTORY  Social History     Tobacco Use   • Smoking status: Former Smoker     Last attempt to quit: 1981     Years since quittin.8   • Smokeless tobacco: Never Used   Substance and Sexual Activity   • Alcohol use: No   • Drug use: No   • Sexual activity: Not on file       Family Hx:  Non contributory     REVIEW OF SYSTEMS  See HPI for further details.  All other systems are negative except as above in HPI.    PHYSICAL EXAM  VITAL SIGNS: /73   Pulse 80   Temp 36.8 °C (98.2 °F) (Tympanic)   Resp 18   Ht 1.778 m (5' 10\")   Wt 122.9 kg (271 lb)   BMI 38.88 kg/m²     General:  WD obese, nontoxic appearing in NAD; A+Ox3; V/S as above; afebrile  Skin: warm and dry; good color; no rash  HEENT: NCAT; EOMs intact; PERRL; no scleral icterus   Neck: FROM; soft  Cardiovascular: Irregularly irregular,  No murmurs, rubs, or gallops; pulses 2+ bilaterally radially   Lungs: Clear to auscultation with good air movement bilaterally.  No wheezes, rhonchi, or rales.   Abdomen: BS present; soft; NTND; no rebound, guarding, or rigidity.  + moderate abdominal hernia noted, " reducible   Extremities: BIANCHI x 4; no e/o trauma; 1+ pitting edema, + small area of focal erythema noted at the tibial tuberosity, no warmth noted around left knee, although extreme tenderness to palpation on left knee.  Decreased range of motion in the left knee secondary to pain; no gross effusion  Neurologic: CNs III-XII grossly intact; speech clear; distal sensation intact; strength 5/5 UE/LEs  Psychiatric: Appropriate affect, normal mood    LABS  Results for AILYN MCINTOSH (MRN [id number]) as of 10/22/2019 12:03   Ref. Range 10/22/2019 10:34   WBC Latest Ref Range: 4.8 - 10.8 K/uL 7.7   RBC Latest Ref Range: 4.70 - 6.10 M/uL 4.55 (L)   Hemoglobin Latest Ref Range: 14.0 - 18.0 g/dL 14.8   Hematocrit Latest Ref Range: 42.0 - 52.0 % 44.9   MCV Latest Ref Range: 81.4 - 97.8 fL 98.7 (H)   MCH Latest Ref Range: 27.0 - 33.0 pg 32.5   MCHC Latest Ref Range: 33.7 - 35.3 g/dL 33.0 (L)   RDW Latest Ref Range: 35.9 - 50.0 fL 51.2 (H)   Platelet Count Latest Ref Range: 164 - 446 K/uL 132 (L)   MPV Latest Ref Range: 9.0 - 12.9 fL 10.3   Neutrophils-Polys Latest Ref Range: 44.00 - 72.00 % 80.00 (H)   Neutrophils (Absolute) Latest Ref Range: 1.82 - 7.42 K/uL 6.16   Lymphocytes Latest Ref Range: 22.00 - 41.00 % 9.10 (L)   Lymphs (Absolute) Latest Ref Range: 1.00 - 4.80 K/uL 0.70 (L)   Monocytes Latest Ref Range: 0.00 - 13.40 % 7.50   Monos (Absolute) Latest Ref Range: 0.00 - 0.85 K/uL 0.58   Eosinophils Latest Ref Range: 0.00 - 6.90 % 2.30   Eos (Absolute) Latest Ref Range: 0.00 - 0.51 K/uL 0.18   Basophils Latest Ref Range: 0.00 - 1.80 % 0.60   Baso (Absolute) Latest Ref Range: 0.00 - 0.12 K/uL 0.05   Immature Granulocytes Latest Ref Range: 0.00 - 0.90 % 0.50   Immature Granulocytes (abs) Latest Ref Range: 0.00 - 0.11 K/uL 0.04   Nucleated RBC Latest Units: /100 WBC 0.00   NRBC (Absolute) Latest Units: K/uL 0.00   Sodium Latest Ref Range: 135 - 145 mmol/L 138   Potassium Latest Ref Range: 3.6 - 5.5 mmol/L 4.7   Chloride  Latest Ref Range: 96 - 112 mmol/L 106   Co2 Latest Ref Range: 20 - 33 mmol/L 26   Anion Gap Latest Ref Range: 0.0 - 11.9  6.0   Glucose Latest Ref Range: 65 - 99 mg/dL 122 (H)   Bun Latest Ref Range: 8 - 22 mg/dL 25 (H)   Creatinine Latest Ref Range: 0.50 - 1.40 mg/dL 1.41 (H)   GFR If  Latest Ref Range: >60 mL/min/1.73 m 2 >60   GFR If Non  Latest Ref Range: >60 mL/min/1.73 m 2 50 (A)   Calcium Latest Ref Range: 8.5 - 10.5 mg/dL 8.8   AST(SGOT) Latest Ref Range: 12 - 45 U/L 17   ALT(SGPT) Latest Ref Range: 2 - 50 U/L 18   Alkaline Phosphatase Latest Ref Range: 30 - 99 U/L 62   Total Bilirubin Latest Ref Range: 0.1 - 1.5 mg/dL 0.7   Albumin Latest Ref Range: 3.2 - 4.9 g/dL 4.1   Total Protein Latest Ref Range: 6.0 - 8.2 g/dL 6.3   Globulin Latest Ref Range: 1.9 - 3.5 g/dL 2.2   A-G Ratio Latest Units: g/dL 1.9   Phosphorus Latest Ref Range: 2.5 - 4.5 mg/dL 2.6   Magnesium Latest Ref Range: 1.5 - 2.5 mg/dL 2.0   CPK Total Latest Ref Range: 0 - 154 U/L 212 (H)   INR Latest Ref Range: 0.87 - 1.13  2.43 (H)   PT Latest Ref Range: 12.0 - 14.6 sec 27.2 (H)       EKG  12 Lead EKG obtained at 10:14 am and interpreted by me to show:  Rhythm: Afib/ A flutter, paced complexes   Rate: 81  Intervals:   QRS: 98   QTC: 437    All intervals are within normal limits  Abnormal ECG   No ST changes  Clinical Impression: No STEMI, afib / aflutter paced     IMAGING  US-EXTREMITY VENOUS LOWER BILAT   Final Result            PROCEDURES  None    MEDICAL RECORD  I have reviewed patient's medical record and pertinent results are listed below.    COURSE & MEDICAL DECISION MAKING  I have reviewed any medical record information, laboratory studies and radiographic results as noted.    Morales Olivier is a 64 y.o. male who presents complaining of left knee pain concerning for acute gout. Items on the differential included gout, septic joint (but patient is afebrile, without gross effusion or warmth), DVT, or leg  strain/injury (but patient denies trauma/injury.).  Physical exam was concerning for gout given extreme tenderness to palpation at tibial tuberosity with no surrounding warmth or erythema. Patient given 1 norco which helped relieve the pain.     CBC with WBC of 7.8, low platelet count of 132 (pt always thrombocytopenic historically), CMP concerning for creatinine of 1.41 with a BUN of 25, glucose 122. Creatinine kinase mildly elevated at 212. Mg and Ph WNL. INR supratherapeutic at 2.43.     Given the likely EBEN 2/2 dehydration, patient given a liter bolus of NS at 11:50 am. Patient also given a one time 1.2 mg of colchicine and which will be followed by a 0.6 mg 1 hour later. Patient will be discharged home with colchicine 0.6 mg      Patient stated he did not have a PCP, therefore ER  was able to provide patient information to schedule a follow up appoinmtent with [facility] internal medicine. PCP will need to follow up with supratherapeutic INR.     Pt was re-evaluated at 12:05 and remained stable. Stated that his pain was improved after the norco.     1:02 PM  DVT studies negative.  Patient reevaluated by the attending physician.  He states he is unable to weight-bear secondary to the pain.  I feel he could benefit from PT/OT evaluation in addition to IV fluids and recheck of creatinine and CPK.  [facility] internal medicine was paged.    13:20  I discussed the case with the [facility] internal medicine team who agrees to admit the patient.    FINAL IMPRESSION  1. Acute pain of left knee     2. EBEN (acute kidney injury) (HCC)     3. Elevated CPK       Electronically signed by: Savi Patel, 10/22/2019 9:53 AM    I independently evaluated the patient and repeated the important components of the history and physical. I discussed the management with the resident. I have reviewed and agree with the pertinent clinical information above including history, exam, study findings, and recommendations.     Electronically signed by:  Savi Patel, 10/22/2019 1:04 PM

## 2019-10-22 NOTE — ED NOTES
Pt arrived via ems per ems pt has hx of gout and ran out of colchicine and is having a flare up in left leg. En route pt received 100mcq of fentanyl. U/a pt sitting up caox4 speaking in full sentences no distress, no sob, nocp, no abd papin, c/o left leg pain.

## 2019-10-22 NOTE — ASSESSMENT & PLAN NOTE
Patient has h/o Type 2 diabetes mellitus, no symptoms of diabetic retinopathy or neuropathy  HbA1c one month ago was 7.6.  Plan;  - was on ISS inpatient, but switch back to metformin on discharge

## 2019-10-22 NOTE — ASSESSMENT & PLAN NOTE
Patient with BMI 38.8, state she put on 10 lbs in last one month.  Although he eating habits remained same he put on weight.  - diabetic education provided during inpatient stay

## 2019-10-22 NOTE — SENIOR ADMIT NOTE
Senior Admit Note:    In summary:   Charline is a 64 y.o. male with PMHx of A-fib (on warfarin, with past Maze and mitral valve replacement), CAD (withpacemeaker), DM-2, HTN, gout, Hx of DVT (thrombectomy 2009), and past kidney stones.     Presented with c/o left knee pain.  He had a similar episode several weeks ago, and was discharged from the ER, with colchicine for 5 days.  He notes the pain has been better after using the medication, and had remained good for 2 weeks.  However, he notes for the past 3 days, he has had severe pain in his left knee, up to a 10/10 pain.  He has since presented to the ER received colchicine and Norco 5, and the pain is currently 2/10.  (He notes up to 8/10 if moving).  He denies any change in diet or alcohol intake.  However, he also gets food delivered to his apartment once a day, and just eats whatever they deliver, and not following any special diet.  He is currently taking allopurinol 100 mg/day, but has not followed up recently to try and have the dose adjusted.    Assessment & Plan:  Gout -   In the ER, he is received colchicine 1.2, as well as 1 L NS, and Norco 5.  Consider continuing colchicine versus steroids.  Hold allopurinol, during acute flare.  (Will likely need higher dose adjustment, after letter is over).  Pharmacy okay with colchicine use, despite EBEN.    Dehydration, vs eben (less likely / not meeting requirement) -  Patient describes drinking about 6 glasses of water a day, but uncertain of this.  Possibly low on water.  Receiving 1 L NS in the ER.  Avoid nephrotoxins, and monitor fluid status.    Elevated CRP-  He was noted to have increased CRP,   Hold Lipitor.  Continue to monitor.    Hx of A-fib and DVT -   Irregular rhythm, but normal rate.  EKG with some ventricular pulses (pacing).   Denies calf pain or radiculopathy.   Takes 5 - 7.5 mg, nightly.  Continue warfarin, per pharmacy dosing.    DM -   Hold metformin.   Start SSI.     For further details , please  refer to   the full H&P by Dr. Liu.     Segundo Null M.D.  10/22/2019

## 2019-10-22 NOTE — ASSESSMENT & PLAN NOTE
Patient has h/o atrial fibrillation rate controlled on metoprolol and gets warfarin for anticoagulation. INR 2.5  EKG atrial fibrillation/Flutter, rate 81 on presentation  Plan:  - continued metoprolol  -  Warfarin was dosed as per pharmacy

## 2019-10-22 NOTE — PROGRESS NOTES
Inpatient Anticoagulation Service Note    Date: 10/22/2019    Reason for Anticoagulation: Atrial Fibrillation   Target INR: 2.0 to 3.0  [id number] VASc Score: 3  HAS-BLED Score: 0   Hemoglobin Value: 14.8  Hematocrit Value: 44.9    INR from last 7 days     Date/Time INR Value    10/22/19 1034  (!) 2.43        Dose from last 7 days     Date/Time Dose (mg)    10/22/19 1548  5        Average Dose (mg): (Per med rec: 7 mg every Wed, 5 mg AOD)  Bridge Therapy: No  Reversal Agent Administered: Not Applicable    Assessment: Admitted with gout flare.  Started on colchicine.  On warfarin PTA for AF.  INR therapeutic on admit.  Last warfarin dose last night.  No identifiable DDI.  H/H WNL.  No overt s/sx of bleeding.      Plan:  Continue home warfarin regimen, as noted above.  Will receive 5 mg tonight.  Follow up INR in the AM.  Education Material Provided?: No(chronic warfarin patient)  Pharmacist suggested discharge dosing: Continue home warfarin regimen 7 mg every Wednesday and 5 mg all other days.  Recommend a follow up INR within 3 days of discharge.       Bethany Ley, Pharm.D., BCPS

## 2019-10-23 ENCOUNTER — HOME HEALTH ADMISSION (OUTPATIENT)
Dept: HOME HEALTH SERVICES | Facility: HOME HEALTHCARE | Age: 64
End: 2019-10-23
Payer: MEDICARE

## 2019-10-23 LAB
ALBUMIN SERPL BCP-MCNC: 3.8 G/DL (ref 3.2–4.9)
ALBUMIN/GLOB SERPL: 1.9 G/DL
ALP SERPL-CCNC: 58 U/L (ref 30–99)
ALT SERPL-CCNC: 16 U/L (ref 2–50)
ANION GAP SERPL CALC-SCNC: 7 MMOL/L (ref 0–11.9)
AST SERPL-CCNC: 15 U/L (ref 12–45)
BASOPHILS # BLD AUTO: 0.3 % (ref 0–1.8)
BASOPHILS # BLD: 0.02 K/UL (ref 0–0.12)
BILIRUB SERPL-MCNC: 0.6 MG/DL (ref 0.1–1.5)
BUN SERPL-MCNC: 20 MG/DL (ref 8–22)
CALCIUM SERPL-MCNC: 8.3 MG/DL (ref 8.5–10.5)
CHLORIDE SERPL-SCNC: 107 MMOL/L (ref 96–112)
CHOLEST SERPL-MCNC: 68 MG/DL (ref 100–199)
CK SERPL-CCNC: 266 U/L (ref 0–154)
CO2 SERPL-SCNC: 25 MMOL/L (ref 20–33)
CREAT SERPL-MCNC: 1.26 MG/DL (ref 0.5–1.4)
EOSINOPHIL # BLD AUTO: 0.21 K/UL (ref 0–0.51)
EOSINOPHIL NFR BLD: 2.9 % (ref 0–6.9)
ERYTHROCYTE [DISTWIDTH] IN BLOOD BY AUTOMATED COUNT: 50.7 FL (ref 35.9–50)
GLOBULIN SER CALC-MCNC: 2 G/DL (ref 1.9–3.5)
GLUCOSE BLD-MCNC: 105 MG/DL (ref 65–99)
GLUCOSE BLD-MCNC: 134 MG/DL (ref 65–99)
GLUCOSE BLD-MCNC: 148 MG/DL (ref 65–99)
GLUCOSE SERPL-MCNC: 96 MG/DL (ref 65–99)
HCT VFR BLD AUTO: 41.2 % (ref 42–52)
HDLC SERPL-MCNC: 20 MG/DL
HGB BLD-MCNC: 13.7 G/DL (ref 14–18)
IMM GRANULOCYTES # BLD AUTO: 0.03 K/UL (ref 0–0.11)
IMM GRANULOCYTES NFR BLD AUTO: 0.4 % (ref 0–0.9)
INR PPP: 2.52 (ref 0.87–1.13)
LDLC SERPL CALC-MCNC: 24 MG/DL
LYMPHOCYTES # BLD AUTO: 1.04 K/UL (ref 1–4.8)
LYMPHOCYTES NFR BLD: 14.3 % (ref 22–41)
MCH RBC QN AUTO: 33.1 PG (ref 27–33)
MCHC RBC AUTO-ENTMCNC: 33.3 G/DL (ref 33.7–35.3)
MCV RBC AUTO: 99.5 FL (ref 81.4–97.8)
MONOCYTES # BLD AUTO: 0.63 K/UL (ref 0–0.85)
MONOCYTES NFR BLD AUTO: 8.6 % (ref 0–13.4)
NEUTROPHILS # BLD AUTO: 5.36 K/UL (ref 1.82–7.42)
NEUTROPHILS NFR BLD: 73.5 % (ref 44–72)
NRBC # BLD AUTO: 0 K/UL
NRBC BLD-RTO: 0 /100 WBC
PLATELET # BLD AUTO: 125 K/UL (ref 164–446)
PMV BLD AUTO: 10.2 FL (ref 9–12.9)
POTASSIUM SERPL-SCNC: 4.7 MMOL/L (ref 3.6–5.5)
PROT SERPL-MCNC: 5.8 G/DL (ref 6–8.2)
PROTHROMBIN TIME: 28.1 SEC (ref 12–14.6)
RBC # BLD AUTO: 4.14 M/UL (ref 4.7–6.1)
SODIUM SERPL-SCNC: 139 MMOL/L (ref 135–145)
TRIGL SERPL-MCNC: 119 MG/DL (ref 0–149)
TSH SERPL DL<=0.005 MIU/L-ACNC: 4.04 UIU/ML (ref 0.38–5.33)
URATE SERPL-MCNC: 7.8 MG/DL (ref 2.5–8.3)
WBC # BLD AUTO: 7.3 K/UL (ref 4.8–10.8)

## 2019-10-23 PROCEDURE — 99232 SBSQ HOSP IP/OBS MODERATE 35: CPT | Mod: GC | Performed by: INTERNAL MEDICINE

## 2019-10-23 PROCEDURE — 85025 COMPLETE CBC W/AUTO DIFF WBC: CPT

## 2019-10-23 PROCEDURE — 80061 LIPID PANEL: CPT

## 2019-10-23 PROCEDURE — 84550 ASSAY OF BLOOD/URIC ACID: CPT

## 2019-10-23 PROCEDURE — 80053 COMPREHEN METABOLIC PANEL: CPT

## 2019-10-23 PROCEDURE — 770006 HCHG ROOM/CARE - MED/SURG/GYN SEMI*

## 2019-10-23 PROCEDURE — 700105 HCHG RX REV CODE 258: Performed by: STUDENT IN AN ORGANIZED HEALTH CARE EDUCATION/TRAINING PROGRAM

## 2019-10-23 PROCEDURE — 97165 OT EVAL LOW COMPLEX 30 MIN: CPT

## 2019-10-23 PROCEDURE — 82550 ASSAY OF CK (CPK): CPT

## 2019-10-23 PROCEDURE — 700102 HCHG RX REV CODE 250 W/ 637 OVERRIDE(OP): Performed by: STUDENT IN AN ORGANIZED HEALTH CARE EDUCATION/TRAINING PROGRAM

## 2019-10-23 PROCEDURE — 97161 PT EVAL LOW COMPLEX 20 MIN: CPT

## 2019-10-23 PROCEDURE — 85610 PROTHROMBIN TIME: CPT

## 2019-10-23 PROCEDURE — 36415 COLL VENOUS BLD VENIPUNCTURE: CPT

## 2019-10-23 PROCEDURE — 84443 ASSAY THYROID STIM HORMONE: CPT

## 2019-10-23 PROCEDURE — 82962 GLUCOSE BLOOD TEST: CPT | Mod: 91

## 2019-10-23 PROCEDURE — A9270 NON-COVERED ITEM OR SERVICE: HCPCS | Performed by: STUDENT IN AN ORGANIZED HEALTH CARE EDUCATION/TRAINING PROGRAM

## 2019-10-23 RX ADMIN — TRAMADOL HYDROCHLORIDE 50 MG: 50 TABLET ORAL at 22:27

## 2019-10-23 RX ADMIN — METOPROLOL TARTRATE 25 MG: 25 TABLET, FILM COATED ORAL at 04:41

## 2019-10-23 RX ADMIN — SODIUM CHLORIDE: 9 INJECTION, SOLUTION INTRAVENOUS at 17:20

## 2019-10-23 RX ADMIN — TRAMADOL HYDROCHLORIDE 50 MG: 50 TABLET ORAL at 03:45

## 2019-10-23 RX ADMIN — SODIUM CHLORIDE: 9 INJECTION, SOLUTION INTRAVENOUS at 10:38

## 2019-10-23 RX ADMIN — SENNOSIDES AND DOCUSATE SODIUM 2 TABLET: 8.6; 5 TABLET ORAL at 17:19

## 2019-10-23 RX ADMIN — TRAMADOL HYDROCHLORIDE 50 MG: 50 TABLET ORAL at 08:48

## 2019-10-23 RX ADMIN — ATORVASTATIN CALCIUM 20 MG: 20 TABLET, FILM COATED ORAL at 04:42

## 2019-10-23 RX ADMIN — TAMSULOSIN HYDROCHLORIDE 0.4 MG: 0.4 CAPSULE ORAL at 08:48

## 2019-10-23 RX ADMIN — OMEPRAZOLE 20 MG: 20 CAPSULE, DELAYED RELEASE ORAL at 04:42

## 2019-10-23 RX ADMIN — TRAZODONE HYDROCHLORIDE 50 MG: 50 TABLET ORAL at 20:46

## 2019-10-23 RX ADMIN — COLCHICINE 0.6 MG: 0.6 TABLET, FILM COATED ORAL at 04:42

## 2019-10-23 RX ADMIN — METOPROLOL TARTRATE 25 MG: 25 TABLET, FILM COATED ORAL at 17:20

## 2019-10-23 RX ADMIN — WARFARIN SODIUM 7 MG: 6 TABLET ORAL at 17:20

## 2019-10-23 RX ADMIN — SODIUM CHLORIDE: 9 INJECTION, SOLUTION INTRAVENOUS at 03:46

## 2019-10-23 RX ADMIN — TRAMADOL HYDROCHLORIDE 50 MG: 50 TABLET ORAL at 13:12

## 2019-10-23 RX ADMIN — LISINOPRIL 5 MG: 2.5 TABLET ORAL at 04:41

## 2019-10-23 RX ADMIN — ALLOPURINOL 100 MG: 100 TABLET ORAL at 04:42

## 2019-10-23 RX ADMIN — TRAMADOL HYDROCHLORIDE 50 MG: 50 TABLET ORAL at 17:19

## 2019-10-23 ASSESSMENT — COGNITIVE AND FUNCTIONAL STATUS - GENERAL
WALKING IN HOSPITAL ROOM: A LITTLE
CLIMB 3 TO 5 STEPS WITH RAILING: A LITTLE
TOILETING: A LITTLE
SUGGESTED CMS G CODE MODIFIER DAILY ACTIVITY: CK
MOVING TO AND FROM BED TO CHAIR: A LOT
DAILY ACTIVITIY SCORE: 18
MOBILITY SCORE: 17
EATING MEALS: A LITTLE
DRESSING REGULAR UPPER BODY CLOTHING: A LITTLE
SUGGESTED CMS G CODE MODIFIER MOBILITY: CK
DRESSING REGULAR LOWER BODY CLOTHING: A LITTLE
MOVING FROM LYING ON BACK TO SITTING ON SIDE OF FLAT BED: A LITTLE
HELP NEEDED FOR BATHING: A LITTLE
TURNING FROM BACK TO SIDE WHILE IN FLAT BAD: A LITTLE
STANDING UP FROM CHAIR USING ARMS: A LITTLE
PERSONAL GROOMING: A LITTLE

## 2019-10-23 ASSESSMENT — ENCOUNTER SYMPTOMS
SHORTNESS OF BREATH: 0
FALLS: 0
BACK PAIN: 0
PALPITATIONS: 0
COUGH: 0
BLOOD IN STOOL: 0
SINUS PAIN: 0
EYE PAIN: 0
SORE THROAT: 0
MYALGIAS: 0
BLURRED VISION: 0
NAUSEA: 0
INSOMNIA: 0
HEADACHES: 0
DEPRESSION: 0
CONSTIPATION: 0
SEIZURES: 0
DIZZINESS: 0
FEVER: 0
VOMITING: 0
HEMOPTYSIS: 0
TINGLING: 0
HEARTBURN: 0
MEMORY LOSS: 0
ABDOMINAL PAIN: 0
CHILLS: 0

## 2019-10-23 ASSESSMENT — GAIT ASSESSMENTS
GAIT LEVEL OF ASSIST: SUPERVISED
ASSISTIVE DEVICE: FRONT WHEEL WALKER
DEVIATION: BRADYKINETIC;ANTALGIC
DISTANCE (FEET): 200

## 2019-10-23 ASSESSMENT — ACTIVITIES OF DAILY LIVING (ADL): TOILETING: INDEPENDENT

## 2019-10-23 NOTE — PROGRESS NOTES
Pt reporting nausea and requesting sleep aid for the evening; paged [employer] purple. Received call back from MD Bernal, who states she will place orders for both.

## 2019-10-23 NOTE — THERAPY
"Occupational Therapy Evaluation completed.   Functional Status: Pt is a 63 y/o male admitted with LLE pain found to have gout flare up. He was pleasant and cooperative. He is at a supv level for bed mobility, functional mobility with FWW, and functional transfers. However, he needed modA to don pants, socks, and shoes WITHOUT equipment. When trained with sock aid and reacher, he was able to do it with supervision and cues. Discussed bathing, pt has tub/shower and would need a tub txf bench due to pain with WB'ing on LLE. Discussed IADLs as he is normally independent with IADLs. He has scarce groceries in his apt as of right now, reporting he usually goes to [facility] for one meal a day. Of note, they are closed Friday ([place] day), Sat, Sunday, so he will be without meals until Monday. If he had the appropriate AE/DME and resources set up at home re: meals, he would be cleared to d/c home however he does not currently have those services/DME set up and would not be able to set them up on his own. At this time, he is not cleared to go home today as he would not be able to sufficently care for himself. Notified  and discussed with them. He would also need home health therapy.  Plan of Care: Will benefit from Occupational Therapy 3 times per week  Discharge Recommendations:  Equipment: Will Continue to Assess for Equipment Needs. See above    See \"Rehab Therapy-Acute\" Patient Summary Report for complete documentation.    "

## 2019-10-23 NOTE — PROGRESS NOTES
2 RN Skin Check      Admission 2 RN skin check complete.   Devices in place: NA  Skin assessed under devices: N\A.  Confirmed pressure ulcers found on: NA.  New potential pressure ulcers noted on NA. Wound consult placed N/A.  The following interventions in place Pillows. Patient repositions self.     Scratches to posterior left thigh. No other signs and symptoms of breakdown.

## 2019-10-23 NOTE — DISCHARGE PLANNING
Received Choice form at 1875  Agency/Facility Name: [facility]  Referral sent per Choice form at 0838

## 2019-10-23 NOTE — RESPIRATORY CARE
COPD EDUCATION by COPD CLINICAL EDUCATOR  10/23/2019 at 6:51 AM by Jarrell Bales     Patient reviewed by COPD education team. Patient does not have a history or diagnosis of COPD and is a non-smoker, therefore does not qualify for the COPD program.

## 2019-10-23 NOTE — FACE TO FACE
Face to Face Note  -  Durable Medical Equipment    Kristie Liu M.D. - NPI: [id number]  I certify that this patient is under my care and that they have had a durable medical equipment(DME)face to face encounter by myself that meets the physician DME face-to-face encounter requirements with this patient on:    Date of encounter:   Patient:                    MRN:                       YOB: 2020  Morales Olivier  [id number]  1955     The encounter with the patient was in whole, or in part, for the following medical condition, which is the primary reason for durable medical equipment:  Other - painful left knee and limited movement.    I certify that, based on my findings, the following durable medical equipment is medically necessary:  Walkers and Other DME Equipment - shower bench.( Four wheel walker)      My Clinical findings support the need for the above equipment due to:  Abnormal Gait, Other - restricted movement of joint and limited duration of weight bearing    Supporting Symptoms: Left knee pain with decreased ROM, unable to bear weight    ------------------------------------------------------------------------------------------------------------------    Face to Face Supporting Documentation - Home Health    The encounter with this patient was in whole or in part the primary reason for home health admission.    Date of encounter:   Patient:                    MRN:                       YOB: 2020  Morales Olivier  [id number]  1955     Home health to see patient for:  Physical Therapy evaluation and treatment and Occupational therapy evaluation and treatment    Skilled need for:  Comment: gout flare      Homebound evidenced status by:  Need the aid of supportive devices such as crutches, canes, wheelchairs or walkers. Leaving home must require a considerable and taxing effort. There must exist a normal inability to leave the home.    Community  Physician to provide follow up care: Pcp Pt States None     Optional Interventions    Wound information & treatment:    Home Infusion Therapy orders:    Line/Drain/Airway:    I certify the face to face encounter for this home care referral meets the CMS requirements and the encounter/clinical assessment with the patient was, in whole, or in part, for the medical condition(s) listed above, which is the primary reason for home health care. Based on my clinical findings: the service(s) are medically necessary, support the need for home health care, and the homebound criteria are met.  I certify that this patient has had a face to face encounter by myself.  Kristie Liu M.D. - NPI: [id number]    *Debility, frailty and advanced age in the absence of an acute deterioration or exacerbation of a condition do not qualify a patient for home health.

## 2019-10-23 NOTE — CARE PLAN
Problem: Communication  Goal: The ability to communicate needs accurately and effectively will improve  Outcome: PROGRESSING AS EXPECTED  Patient verbalizing needs. Calls for assistance.      Problem: Safety  Goal: Will remain free from falls  Outcome: PROGRESSING AS EXPECTED  Fall precautions in place, non skid socks, fall arm, arm band, bed alarm. Call light in reach.

## 2019-10-23 NOTE — PROGRESS NOTES
Internal Medicine Interval Note  Note Author: Kristie Liu M.D.     Name Morales Olivier     1955   Age/Sex 64 y.o. male   MRN [id number]   Code Status Full code     After 5PM or if no immediate response to page, please call for cross-coverage  Attending/Team: /Errol See Patient List for primary contact information  Call [phone] to page    1st Call - Day Intern (R1):    2nd Call - Day Sr. Resident (R2/R3):            Reason for interval visit  (Principal Problem)   Left knee pain      Interval Problem Daily Status Update  (24 hours, problem oriented, brief subjective history, new lab/imaging data pertinent to that problem)   - overnight no acute events, vitals stable  - Pain of left knee improved but ROM still limited. Patient able to walk but has some pain about 6/10.  -No fever, Xray of left knee showed no fracture, except for ovoid soft tissue density in the popliteal fossa mass or popliteal cyst are considerations per report.  -Physical therapy cleared patient for discharge but occupational therapy recommended home health therapy and DME like shower bench and four wheel walker. This remains a barrier to discharge today.  - Will continue colchicine, allopurinol, with adequate pain control            Review of Systems   Constitutional: Negative for chills and fever.   HENT: Negative for ear discharge, hearing loss, sinus pain and sore throat.    Eyes: Negative for blurred vision and pain.   Respiratory: Negative for cough, hemoptysis and shortness of breath.    Cardiovascular: Negative for chest pain, palpitations and leg swelling.   Gastrointestinal: Negative for abdominal pain, blood in stool, constipation, heartburn, nausea and vomiting.   Genitourinary: Negative for dysuria and frequency.   Musculoskeletal: Positive for joint pain. Negative for back pain, falls and myalgias.   Skin: Negative for itching and rash.   Neurological: Negative for  dizziness, tingling, seizures and headaches.   Psychiatric/Behavioral: Negative for depression and memory loss. The patient does not have insomnia.        Disposition/Barriers to discharge:   Requirement for Home health and DME supplies    Consultants/Specialty  none  PCP: Pcp Pt States None      Quality Measures  Quality-Core Measures   Reviewed items::  Labs reviewed, EKG reviewed and Medications reviewed  Fragoso catheter::  No Fragoso  DVT prophylaxis pharmacological::  Warfarin (Coumadin)          Physical Exam       Vitals:    10/23/19 0000 10/23/19 0400 10/23/19 0441 10/23/19 0800   BP: 133/80 113/76 113/76 111/58   Pulse: 85 83 83 83   Resp: 18 16  17   Temp: 36.3 °C (97.4 °F) 36.7 °C (98.1 °F)  36.3 °C (97.4 °F)   TempSrc: Temporal Temporal  Temporal   SpO2: 96% 96%  99%   Weight:       Height:         Body mass index is 38.88 kg/m². Weight: 122.9 kg (270 lb 15.1 oz)  Oxygen Therapy:  Pulse Oximetry: 99 %, O2 (LPM): 0, O2 Delivery: None (Room Air)    Physical Exam   Constitutional: He is oriented to person, place, and time and well-developed, well-nourished, and in no distress.   HENT:   Head: Normocephalic and atraumatic.   Mouth/Throat: No oropharyngeal exudate.   Eyes: Pupils are equal, round, and reactive to light. Conjunctivae and EOM are normal.   Neck: Normal range of motion. Neck supple.   Cardiovascular: Normal rate, regular rhythm and normal heart sounds.   No murmur heard.  Pulmonary/Chest: Effort normal and breath sounds normal. He has no wheezes.   Abdominal: Soft. Bowel sounds are normal. There is no tenderness.   Musculoskeletal: He exhibits no edema.   Left knee erythema over tibial tuberosity improved.   ROM improved since yesterday but still limited   Neurological: He is alert and oriented to person, place, and time. No cranial nerve deficit. He exhibits normal muscle tone.   Skin: Skin is warm. No rash noted. He is not diaphoretic. No pallor.   Psychiatric: Mood, memory and affect normal.              Assessment/Plan     * Gout attack- (present on admission)  Assessment & Plan  Patient was diagnosed with Gout several years ago and is on allopurinol, He has had one flare up last month for which he was treated with colchicine. Currently severe pain overnight with ROM limitation of left knee.  Minimal erythema, no swelling.Tophi noted on right great toe, non tender. Uric acid level 7.8, goal would be <4 in he presence of tophi. Xray left knee:  showed no fracture, except for ovoid soft tissue density in the popliteal fossa mass or popliteal cyst are considerations per report.    Plan:  - Colchicine 0.6mg daily for 5 days  - continue allopurinol 100 daily, will increase to 300 on discharge  - PT cleared patient but OT recommended Home health and DME which remains a barrier for discharge.    Dehydration  Assessment & Plan  Creatinine back to baseline 1.2 with fluids.  Resolved now  Plan:  - monitor renal function      Type 2 diabetes mellitus (HCC)  Assessment & Plan  Patient has h/o Type 2 diabetes mellitus, no symptoms of diabetic retinopathy or neuropathy  HbA1c one month ago was 7.6, Blood sugars at 96 now fasting this am  Plan;  - ISS  - hypoglycemia protocol  - accu check  - continue low dose lisinopril     Paroxysmal atrial fibrillation (HCC)- (present on admission)  Assessment & Plan  Patient has h/o atrial fibrillation rate controlled on metoprolol and gets warfarin for anticoagulation. INR 2.5  EKG atrial fibrillation/Flutter, rate 81 on presentation  Plan:  - continue metoprolol  - pharmacy to dose warfarin      Obesity (BMI 30-39.9)- (present on admission)  Assessment & Plan  Patient with BMI 38.8, state she put on 10 lbs in last one month.  Although he eating habits remained same he put on weight.  - would benefit from nutrition diet education

## 2019-10-23 NOTE — DISCHARGE PLANNING
We are currently verifying MD acceptance of your patient. This will be resolved ASAP. If you want this escalated for a faster response please call [phone] and press option #2. Thank you for your patience.     Respectfully,   [facility]

## 2019-10-23 NOTE — THERAPY
"Physical Therapy Evaluation completed.   Bed Mobility:  Supine to Sit: Supervised  Transfers: Sit to Stand: Supervised  Gait: Level Of Assist: 200ft Supervised with Front-Wheel Walker       Plan of Care: Patient with no further skilled PT needs in the acute care setting and demonstrates safety with mobility in this environment at this time.   Discharge Recommendations: Equipment: Front-Wheel Walker. Recommend home health transitional care for continued physical therapy services.     See \"Rehab Therapy-Acute\" Patient Summary Report for complete documentation.     Pt is a 65yo male presenting to acute with L knee pain due to gout flare up. Pt demonstrated ability to perform supine <> sit without bed features with SPV and ambulated 200ft with FWW, antalgic gait, but no LOB. Pt feels if he had a FWW at home, he daniel be able to mobilize better. Recommend FWW at DC. Pt has difficulty with ADLs at this time and would benefit from various DME in his home. Defer to occupational therapy for DC disposition and recommendations regarding ADL/IADL management. Patient will not be actively followed for physical therapy services at this time, however may be seen if requested by physician for 1 more visit within 30 days to address any discharge or equipment needs.  "

## 2019-10-23 NOTE — CARE PLAN
Problem: Infection  Goal: Will remain free from infection  Outcome: PROGRESSING AS EXPECTED  Note:   Standard precautions in place     Problem: Safety  Goal: Will remain free from injury  Note:   Safety precautions in place. Bed in lowest and locked position. Call light and personal belongings within reach. DME out of sight

## 2019-10-23 NOTE — PROGRESS NOTES
Patient arrived to floor via gurney at 1600. Assisted patient in bed,  Skin check done, fall precautions in place. Pain 9/10 pain. Heat pack applied. Oriented patient to room and use of call light. Call light in reach.

## 2019-10-23 NOTE — DISCHARGE PLANNING
Received Choice form at 1530  Agency/Facility Name: [facility]  Referral sent per Choice form at 154

## 2019-10-23 NOTE — CARE PLAN
Problem: Safety  Goal: Will remain free from falls  Outcome: PROGRESSING AS EXPECTED  Intervention: Implement fall precautions  Flowsheets  Taken 10/23/2019 0901  Bed Alarm: Yes - Alarm On  Taken 10/23/2019 0847  Environmental Precautions: Treaded Slipper Socks on Patient;Report Given to Other Health Care Providers Regarding Fall Risk;Bed in Low Position;Communication Sign for Patients & Families;Mobility Assessed & Appropriate Sign Placed;Personal Belongings, Wastebasket, Call Bell etc. in Easy Reach;Transferred to Stronger Side  Note:   Safety precautions in place. Call light within reach. Bed is low and in locked position. Hourly rounding in place. Bed alarm in use.      Problem: Discharge Barriers/Planning  Goal: Patient's continuum of care needs will be met  Outcome: PROGRESSING AS EXPECTED  Note:   PT/OT today

## 2019-10-23 NOTE — DISCHARGE PLANNING
Anticipated Disposition:  Home      Action:  Met with Pt in his room,Gerald friend visited Pt at bedside. Per OT, Pt needs Home Health,FWW, and other DMEs.   [facility] signed and faxed to [facility] for FWW.   Home Health choice form provided to Morales, he selected [facility] and [facility].   His PCP is a doctor from [facility] family ,unable to recall the name.                Barriers to Discharge:   Pain control and HH acceptance.       Plan:  Follow up with [facility] for Home Health acceptance.         Care Transition Team Assessment    Information Source  Orientation : Oriented x 4  Information Given By: Patient  Informant's Name: Morales  Who is responsible for making decisions for patient? : Patient    Readmission Evaluation  Is this a readmission?: Yes - unplanned readmission    Elopement Risk  Legal Hold: No  Ambulatory or Self Mobile in Wheelchair: Yes  Disoriented: No  Psychiatric Symptoms: None  History of Wandering: No  Elopement this Admit: No  Vocalizing Wanting to Leave: No  Displays Behaviors, Body Language Wanting to Leave: No-Not at Risk for Elopement  Elopement Risk: Not at Risk for Elopement    Interdisciplinary Discharge Planning  Does Admitting Nurse Feel This Could be a Complex Discharge?: No  Primary Care Physician: Unable to recall name  Lives with - Patient's Self Care Capacity: Alone and Able to Care For Self  Patient or legal guardian wants to designate a caregiver (see row info): No  Support Systems: Friends / Neighbors  Housing / Facility: 1 Story Apartment / Condo  Do You Take your Prescribed Medications Regularly: No  Reasons Why Not Taking Medications : Didn't Refill Prescriptions   Able to Return to Previous ADL's: Yes  Mobility Issues: Yes  Prior Services: Home-Independent  Patient Expects to be Discharged to:: Home  Assistance Needed: Yes  Durable Medical Equipment: Walker    Discharge Preparedness  What is your plan after discharge?: Home with help  What are your discharge supports?:  Other (comment)(friend)  Prior Functional Level: Ambulatory    Functional Assesment  Prior Functional Level: Ambulatory    Finances  Financial Barriers to Discharge: No  Prescription Coverage: Yes    Vision / Hearing Impairment  Vision Impairment : No  Hearing Impairment : No              Domestic Abuse  Have you ever been the victim of abuse or violence?: No  Physical Abuse or Sexual Abuse: No  Verbal Abuse or Emotional Abuse: No  Possible Abuse Reported to:: Not Applicable         Discharge Risks or Barriers  Discharge risks or barriers?: Transportation    Anticipated Discharge Information  Anticipated discharge disposition: Home  Discharge Address[facility] [address]

## 2019-10-23 NOTE — PROGRESS NOTES
Inpatient Anticoagulation Service Note    Date: 10/23/2019    Reason for Anticoagulation: Atrial Fibrillation, Mechanical Mitral Valve Replacement, Deep Vein Thrombosis     Target INR: 2.5 to 3.5     [id number] VASc Score: 3  HAS-BLED Score: 0     Hemoglobin Value: (!) 13.7  Hematocrit Value: (!) 41.2    INR from last 7 days     Date/Time INR Value    10/23/19 0307  (!) 2.52    10/22/19 1034  (!) 2.43        Dose from last 7 days     Date/Time Dose (mg)    10/23/19 1009  7    10/22/19 1548  5        Average Dose (mg): (Per med rec: 7 mg every Wed, 5 mg AOD)  Significant Interactions: Other (Comments), Statin, Proton Pump Inhibitor(Allopurinol)  Bridge Therapy: No   Reversal Agent Administered: Not Applicable    HPI: 63 yo male admitted on 10/22/19 for acute gout flare, initiated on colchicine. Patient is chronically anticoagulated with warfarin therapy for Mitral valve replacement, Afib & DVT with INR goal of 2.5 - 3.5. Warfarin is managed outpatient by the [facility] and per records, patient is prescribed Warfarin 7mg po every Wednesday (2mg + 5mg tablet) and Warfarin 5mg po on all other days. This was a recent regimen increase by [facility] on 10/16/19 d/t subtherapeutic INR in clinic. Time in therapeutic range (outpatient) reported as 48%.     Assessment: INR slightly subtherapeutic on admission, returns to therapeutic range today 2.52 from 2.43 following resumption of usual home dosing regimen.   · Potential drug-drug interactions identified with acute inpatient medications: No major DDIs identified.    · Potential drug-drug interactions identified with chronic home medications: Atorvastatin, Omperazole and Allopurinol which are established interactions with warfarin.  · Inpatient Diet: Diabetic      Plan: Continue usual home dosing regimen - Warfarin 7mg tonight. INR monitoring a couple days while acutely ill then will reduce frequency of INR draws.     Education Material Provided?: No (Chronic  AC)    Pharmacist suggested discharge dosing: Warfarin 7mg po every Wednesday (2mg + 5mg tablet) and Warfarin 5mg po on all other days.     Celia K. Christopher, PharmD

## 2019-10-24 VITALS
OXYGEN SATURATION: 98 % | SYSTOLIC BLOOD PRESSURE: 111 MMHG | HEART RATE: 91 BPM | RESPIRATION RATE: 17 BRPM | DIASTOLIC BLOOD PRESSURE: 78 MMHG | BODY MASS INDEX: 38.79 KG/M2 | WEIGHT: 270.95 LBS | TEMPERATURE: 97.2 F | HEIGHT: 70 IN

## 2019-10-24 LAB
ANION GAP SERPL CALC-SCNC: 8 MMOL/L (ref 0–11.9)
BUN SERPL-MCNC: 20 MG/DL (ref 8–22)
CALCIUM SERPL-MCNC: 8.2 MG/DL (ref 8.5–10.5)
CHLORIDE SERPL-SCNC: 108 MMOL/L (ref 96–112)
CO2 SERPL-SCNC: 21 MMOL/L (ref 20–33)
CREAT SERPL-MCNC: 1.18 MG/DL (ref 0.5–1.4)
ERYTHROCYTE [DISTWIDTH] IN BLOOD BY AUTOMATED COUNT: 51.2 FL (ref 35.9–50)
GLUCOSE SERPL-MCNC: 105 MG/DL (ref 65–99)
HCT VFR BLD AUTO: 39.3 % (ref 42–52)
HGB BLD-MCNC: 13 G/DL (ref 14–18)
INR PPP: 2.99 (ref 0.87–1.13)
MCH RBC QN AUTO: 32.7 PG (ref 27–33)
MCHC RBC AUTO-ENTMCNC: 33.1 G/DL (ref 33.7–35.3)
MCV RBC AUTO: 98.7 FL (ref 81.4–97.8)
PLATELET # BLD AUTO: 103 K/UL (ref 164–446)
PMV BLD AUTO: 10.3 FL (ref 9–12.9)
POTASSIUM SERPL-SCNC: 4.3 MMOL/L (ref 3.6–5.5)
PROTHROMBIN TIME: 32.2 SEC (ref 12–14.6)
RBC # BLD AUTO: 3.98 M/UL (ref 4.7–6.1)
SODIUM SERPL-SCNC: 137 MMOL/L (ref 135–145)
URATE SERPL-MCNC: 7.1 MG/DL (ref 2.5–8.3)
WBC # BLD AUTO: 5.5 K/UL (ref 4.8–10.8)

## 2019-10-24 PROCEDURE — 84550 ASSAY OF BLOOD/URIC ACID: CPT

## 2019-10-24 PROCEDURE — 80048 BASIC METABOLIC PNL TOTAL CA: CPT

## 2019-10-24 PROCEDURE — 99238 HOSP IP/OBS DSCHRG MGMT 30/<: CPT | Mod: GC | Performed by: INTERNAL MEDICINE

## 2019-10-24 PROCEDURE — A9270 NON-COVERED ITEM OR SERVICE: HCPCS | Performed by: STUDENT IN AN ORGANIZED HEALTH CARE EDUCATION/TRAINING PROGRAM

## 2019-10-24 PROCEDURE — 85610 PROTHROMBIN TIME: CPT

## 2019-10-24 PROCEDURE — 85027 COMPLETE CBC AUTOMATED: CPT

## 2019-10-24 PROCEDURE — 36415 COLL VENOUS BLD VENIPUNCTURE: CPT

## 2019-10-24 PROCEDURE — 700105 HCHG RX REV CODE 258: Performed by: STUDENT IN AN ORGANIZED HEALTH CARE EDUCATION/TRAINING PROGRAM

## 2019-10-24 PROCEDURE — 82962 GLUCOSE BLOOD TEST: CPT

## 2019-10-24 PROCEDURE — 700102 HCHG RX REV CODE 250 W/ 637 OVERRIDE(OP): Performed by: STUDENT IN AN ORGANIZED HEALTH CARE EDUCATION/TRAINING PROGRAM

## 2019-10-24 RX ORDER — COLCHICINE 0.6 MG/1
0.6 TABLET ORAL DAILY
Qty: 4 TAB | Refills: 0 | Status: SHIPPED | OUTPATIENT
Start: 2019-10-25 | End: 2019-10-29

## 2019-10-24 RX ORDER — ALBUTEROL SULFATE 90 UG/1
2 AEROSOL, METERED RESPIRATORY (INHALATION) EVERY 6 HOURS PRN
Status: DISCONTINUED | OUTPATIENT
Start: 2019-10-24 | End: 2019-10-24 | Stop reason: HOSPADM

## 2019-10-24 RX ORDER — ALLOPURINOL 100 MG/1
100 TABLET ORAL DAILY
Qty: 30 TAB | Refills: 5 | Status: SHIPPED | OUTPATIENT
Start: 2019-10-24 | End: 2020-03-11

## 2019-10-24 RX ORDER — ALBUTEROL SULFATE 90 UG/1
2 AEROSOL, METERED RESPIRATORY (INHALATION) EVERY 6 HOURS PRN
Qty: 8.5 G | Refills: 0 | Status: SHIPPED | OUTPATIENT
Start: 2019-10-24 | End: 2020-02-28

## 2019-10-24 RX ORDER — WARFARIN SODIUM 2.5 MG/1
2.5 TABLET ORAL
Status: DISCONTINUED | OUTPATIENT
Start: 2019-10-24 | End: 2019-10-24 | Stop reason: HOSPADM

## 2019-10-24 RX ADMIN — COLCHICINE 0.6 MG: 0.6 TABLET, FILM COATED ORAL at 04:10

## 2019-10-24 RX ADMIN — TRAMADOL HYDROCHLORIDE 50 MG: 50 TABLET ORAL at 08:28

## 2019-10-24 RX ADMIN — TRAMADOL HYDROCHLORIDE 50 MG: 50 TABLET ORAL at 13:15

## 2019-10-24 RX ADMIN — ACETAMINOPHEN 650 MG: 325 TABLET, FILM COATED ORAL at 16:30

## 2019-10-24 RX ADMIN — ATORVASTATIN CALCIUM 20 MG: 20 TABLET, FILM COATED ORAL at 04:10

## 2019-10-24 RX ADMIN — TRAMADOL HYDROCHLORIDE 50 MG: 50 TABLET ORAL at 04:10

## 2019-10-24 RX ADMIN — SENNOSIDES AND DOCUSATE SODIUM 2 TABLET: 8.6; 5 TABLET ORAL at 04:10

## 2019-10-24 RX ADMIN — METOPROLOL TARTRATE 25 MG: 25 TABLET, FILM COATED ORAL at 04:10

## 2019-10-24 RX ADMIN — LISINOPRIL 5 MG: 2.5 TABLET ORAL at 04:10

## 2019-10-24 RX ADMIN — OMEPRAZOLE 20 MG: 20 CAPSULE, DELAYED RELEASE ORAL at 04:10

## 2019-10-24 RX ADMIN — ALBUTEROL SULFATE 2 PUFF: 90 AEROSOL, METERED RESPIRATORY (INHALATION) at 15:14

## 2019-10-24 RX ADMIN — TAMSULOSIN HYDROCHLORIDE 0.4 MG: 0.4 CAPSULE ORAL at 08:28

## 2019-10-24 RX ADMIN — SODIUM CHLORIDE: 9 INJECTION, SOLUTION INTRAVENOUS at 04:11

## 2019-10-24 RX ADMIN — ALLOPURINOL 100 MG: 100 TABLET ORAL at 04:10

## 2019-10-24 ASSESSMENT — ENCOUNTER SYMPTOMS
HEARTBURN: 0
BACK PAIN: 0
DEPRESSION: 0
DIZZINESS: 0
TINGLING: 0
COUGH: 0
BLURRED VISION: 0
FALLS: 0
CHILLS: 0
NAUSEA: 0
ABDOMINAL PAIN: 0
MYALGIAS: 0
MEMORY LOSS: 0
SEIZURES: 0
SHORTNESS OF BREATH: 0
HEADACHES: 0
SORE THROAT: 0
INSOMNIA: 0
VOMITING: 0
FEVER: 0
CONSTIPATION: 0
PALPITATIONS: 0
BLOOD IN STOOL: 0
EYE PAIN: 0
SINUS PAIN: 0
HEMOPTYSIS: 0

## 2019-10-24 ASSESSMENT — CHA2DS2 SCORE
DIABETES: YES
AGE 65 TO 74: YES
PRIOR STROKE OR TIA OR THROMBOEMBOLISM: NO
SEX: MALE
CHA2DS2 VASC SCORE: 3
VASCULAR DISEASE: NO
AGE 75 OR GREATER: NO
HYPERTENSION: YES
CHF OR LEFT VENTRICULAR DYSFUNCTION: NO

## 2019-10-24 NOTE — PROGRESS NOTES
Diabetes education: Handout of foot care given. Please reorder diabetes education if needs change.

## 2019-10-24 NOTE — DISCHARGE PLANNING
ATTN: Case Management  RE: Referral for Home Health    As of 10/24/2019, we have accepted the Home Health referral for the patient listed above.    A [facility] clinician will be out to see the patient within 48 hours. If you have any questions or concerns regarding the patient’s transition to Home Health, please do not hesitate to contact us at [phone].      We look forward to collaborating with you,  [facility] Team

## 2019-10-24 NOTE — DISCHARGE INSTRUCTIONS
Discharge Instructions    Discharged to home by taxi with escort. Discharged via wheelchair, hospital escort: Yes.  Special equipment needed: Walker    Be sure to schedule a follow-up appointment with your primary care doctor or any specialists as instructed.     Discharge Plan:   Influenza Vaccine Indication: Patient Refuses    I understand that a diet low in cholesterol, fat, and sodium is recommended for good health. Unless I have been given specific instructions below for another diet, I accept this instruction as my diet prescription.   Other diet: Diabetic    Special Instructions: None    · Is patient discharged on Warfarin / Coumadin?   Yes    You are receiving the drug warfarin. Please understand the importance of monitoring warfarin with scheduled PT/INR blood draws.  Follow-up with the [facility] in one week for INR lab.    IMPORTANT: HOW TO USE THIS INFORMATION:  This is a summary and does NOT have all possible information about this product. This information does not assure that this product is safe, effective, or appropriate for you. This information is not individual medical advice and does not substitute for the advice of your health care professional. Always ask your health care professional for complete information about this product and your specific health needs.      WARFARIN - ORAL (WARF-uh-rin)      COMMON BRAND NAME(S): Coumadin      WARNING:  Warfarin can cause very serious (possibly fatal) bleeding. This is more likely to occur when you first start taking this medication or if you take too much warfarin. To decrease your risk for bleeding, your doctor or other health care provider will monitor you closely and check your lab results (INR test) to make sure you are not taking too much warfarin. Keep all medical and laboratory appointments. Tell your doctor right away if you notice any signs of serious bleeding. See also Side Effects section.      USES:  This medication is used to treat  "blood clots (such as in deep vein thrombosis-DVT or pulmonary embolus-PE) and/or to prevent new clots from forming in your body. Preventing harmful blood clots helps to reduce the risk of a stroke or heart attack. Conditions that increase your risk of developing blood clots include a certain type of irregular heart rhythm (atrial fibrillation), heart valve replacement, recent heart attack, and certain surgeries (such as hip/knee replacement). Warfarin is commonly called a \"blood thinner,\" but the more correct term is \"anticoagulant.\" It helps to keep blood flowing smoothly in your body by decreasing the amount of certain substances (clotting proteins) in your blood.      HOW TO USE:  Read the Medication Guide provided by your pharmacist before you start taking warfarin and each time you get a refill. If you have any questions, ask your doctor or pharmacist. Take this medication by mouth with or without food as directed by your doctor or other health care professional, usually once a day. It is very important to take it exactly as directed. Do not increase the dose, take it more frequently, or stop using it unless directed by your doctor. Dosage is based on your medical condition, laboratory tests (such as INR), and response to treatment. Your doctor or other health care provider will monitor you closely while you are taking this medication to determine the right dose for you. Use this medication regularly to get the most benefit from it. To help you remember, take it at the same time each day. It is important to eat a balanced, consistent diet while taking warfarin. Some foods can affect how warfarin works in your body and may affect your treatment and dose. Avoid sudden large increases or decreases in your intake of foods high in vitamin K (such as broccoli, cauliflower, cabbage, brussels sprouts, kale, spinach, and other green leafy vegetables, liver, green tea, certain vitamin supplements). If you are trying to " lose weight, check with your doctor before you try to go on a diet. Cranberry products may also affect how your warfarin works. Limit the amount of cranberry juice (16 ounces/480 milliliters a day) or other cranberry products you may drink or eat.      SIDE EFFECTS:  Nausea, loss of appetite, or stomach/abdominal pain may occur. If any of these effects persist or worsen, tell your doctor or pharmacist promptly. Remember that your doctor has prescribed this medication because he or she has judged that the benefit to you is greater than the risk of side effects. Many people using this medication do not have serious side effects. This medication can cause serious bleeding if it affects your blood clotting proteins too much (shown by unusually high INR lab results). Even if your doctor stops your medication, this risk of bleeding can continue for up to a week. Tell your doctor right away if you have any signs of serious bleeding, including: unusual pain/swelling/discomfort, unusual/easy bruising, prolonged bleeding from cuts or gums, persistent/frequent nosebleeds, unusually heavy/prolonged menstrual flow, pink/dark urine, coughing up blood, vomit that is bloody or looks like coffee grounds, severe headache, dizziness/fainting, unusual or persistent tiredness/weakness, bloody/black/tarry stools, chest pain, shortness of breath, difficulty swallowing. Tell your doctor right away if any of these unlikely but serious side effects occur: persistent nausea/vomiting, severe stomach/abdominal pain, yellowing eyes/skin. This drug rarely has caused very serious (possibly fatal) problems if its effects lead to small blood clots (usually at the beginning of treatment). This can lead to severe skin/tissue damage that may require surgery or amputation if left untreated. Patients with certain blood conditions (protein C or S deficiency) may be at greater risk. Get medical help right away if any of these rare but serious side effects  occur: painful/red/purplish patches on the skin (such as on the toe, breast, abdomen), change in the amount of urine, vision changes, confusion, slurred speech, weakness on one side of the body. A very serious allergic reaction to this drug is rare. However, get medical help right away if you notice any symptoms of a serious allergic reaction, including: rash, itching/swelling (especially of the face/tongue/throat), severe dizziness, trouble breathing. This is not a complete list of possible side effects. If you notice other effects not listed above, contact your doctor or pharmacist. In the [place] - Call your doctor for medical advice about side effects. You may report side effects to FDA at 6-236-XCU-7785. In [place] - Call your doctor for medical advice about side effects. You may report side effects to [facility] [place] at [phone].      PRECAUTIONS:  Before taking warfarin, tell your doctor or pharmacist if you are allergic to it; or if you have any other allergies. This product may contain inactive ingredients, which can cause allergic reactions or other problems. Talk to your pharmacist for more details. Before using this medication, tell your doctor or pharmacist your medical history, especially of: blood disorders (such as anemia, hemophilia), bleeding problems (such as bleeding of the stomach/intestines, bleeding in the brain), blood vessel disorders (such as aneurysms), recent major injury/surgery, liver disease, alcohol use, mental/mood disorders (including memory problems), frequent falls/injuries. It is important that all your doctors and dentists know that you take warfarin. Before having surgery or any medical/dental procedures, tell your doctor or dentist that you are taking this medication and about all the products you use (including prescription drugs, nonprescription drugs, and herbal products). Avoid getting injections into the muscles. If you must have an injection into a muscle (for example, a  flu shot), it should be given in the arm. This way, it will be easier to check for bleeding and/or apply pressure bandages. This medication may cause stomach bleeding. Daily use of alcohol while using this medicine will increase your risk for stomach bleeding and may also affect how this medication works. Limit or avoid alcoholic beverages. If you have not been eating well, if you have an illness or infection that causes fever, vomiting, or diarrhea for more than 2 days, or if you start using any antibiotic medications, contact your doctor or pharmacist immediately because these conditions can affect how warfarin works. This medication can cause heavy bleeding. To lower the chance of getting cut, bruised, or injured, use great caution with sharp objects like safety razors and nail cutters. Use an electric razor when shaving and a soft toothbrush when brushing your teeth. Avoid activities such as contact sports. If you fall or injure yourself, especially if you hit your head, call your doctor immediately. Your doctor may need to check you. The Food & Drug Administration has stated that generic warfarin products are interchangeable. However, consult your doctor or pharmacist before switching warfarin products. Be careful not to take more than one medication that contains warfarin unless specifically directed by the doctor or health care provider who is monitoring your warfarin treatment. Older adults may be at greater risk for bleeding while using this drug. This medication is not recommended for use during pregnancy because of serious (possibly fatal) harm to an unborn baby. Discuss the use of reliable forms of birth control with your doctor. If you become pregnant or think you may be pregnant, tell your doctor immediately. If you are planning pregnancy, discuss a plan for managing your condition with your doctor before you become pregnant. Your doctor may switch the type of medication you use during pregnancy. Very  "small amounts of this medication may pass into breast milk but is unlikely to harm a nursing infant. Consult your doctor before breast-feeding.      DRUG INTERACTIONS:  Drug interactions may change how your medications work or increase your risk for serious side effects. This document does not contain all possible drug interactions. Keep a list of all the products you use (including prescription/nonprescription drugs and herbal products) and share it with your doctor and pharmacist. Do not start, stop, or change the dosage of any medicines without your doctor's approval. Warfarin interacts with many prescription, nonprescription, vitamin, and herbal products. This includes medications that are applied to the skin or inside the vagina or rectum. The interactions with warfarin usually result in an increase or decrease in the \"blood-thinning\" (anticoagulant) effect. Your doctor or other health care professional should closely monitor you to prevent serious bleeding or clotting problems. While taking warfarin, it is very important to tell your doctor or pharmacist of any changes in medications, vitamins, or herbal products that you are taking. Some products that may interact with this drug include: capecitabine, imatinib, mifepristone. Aspirin, aspirin-like drugs (salicylates), and nonsteroidal anti-inflammatory drugs (NSAIDs such as ibuprofen, naproxen, celecoxib) may have effects similar to warfarin. These drugs may increase the risk of bleeding problems if taken during treatment with warfarin. Carefully check all prescription/nonprescription product labels (including drugs applied to the skin such as pain-relieving creams) since the products may contain NSAIDs or salicylates. Talk to your doctor about using a different medication (such as acetaminophen) to treat pain/fever. Low-dose aspirin and related drugs (such as clopidogrel, ticlopidine) should be continued if prescribed by your doctor for specific medical " reasons such as heart attack or stroke prevention. Consult your doctor or pharmacist for more details. Many herbal products interact with warfarin. Tell your doctor before taking any herbal products, especially bromelains, coenzyme Q10, cranberry, danshen, dong quai, fenugreek, garlic, ginkgo biloba, ginseng, and Thong's wort, among others. This medication may interfere with a certain laboratory test to measure theophylline levels, possibly causing false test results. Make sure laboratory personnel and all your doctors know you use this drug.      OVERDOSE:  If overdose is suspected, contact a poison control center or emergency room immediately. [place] residents can call the [place] National Poison Hotline at 1-158.877.9917. [place] residents can call a provincial poison control center. Symptoms of overdose may include: bloody/black/tarry stools, pink/dark urine, unusual/prolonged bleeding.      NOTES:  Do not share this medication with others. Laboratory and/or medical tests (such as INR, complete blood count) must be performed periodically to monitor your progress or check for side effects. Consult your doctor for more details.      MISSED DOSE:  For the best possible benefit, do not miss any doses. If you do miss a dose and remember on the same day, take it as soon as you remember. If you remember on the next day, skip the missed dose and resume your usual dosing schedule. Do not double the dose to catch up because this could increase your risk for bleeding. Keep a record of missed doses to give to your doctor or pharmacist. Contact your doctor or pharmacist if you miss 2 or more doses in a row.      STORAGE:  Store at room temperature away from light and moisture. Do not store in the bathroom. Keep all medications away from children and pets. Do not flush medications down the toilet or pour them into a drain unless instructed to do so. Properly discard this product when it is  or no longer needed. Consult your  pharmacist or local waste disposal company for more details about how to safely discard your product.      MEDICAL ALERT:  Your condition and medication can cause complications in a medical emergency. For information about enrolling in MedicAlert, call 1-488.927.8508 ([place]) or 1-372.998.7830 ([place]).      Information last revised October 2010 Copyright(c) 2010 [employer]             Depression / Suicide Risk    As you are discharged from this [facility] facility, it is important to learn how to keep safe from harming yourself.    Recognize the warning signs:  · Abrupt changes in personality, positive or negative- including increase in energy   · Giving away possessions  · Change in eating patterns- significant weight changes-  positive or negative  · Change in sleeping patterns- unable to sleep or sleeping all the time   · Unwillingness or inability to communicate  · Depression  · Unusual sadness, discouragement and loneliness  · Talk of wanting to die  · Neglect of personal appearance   · Rebelliousness- reckless behavior  · Withdrawal from people/activities they love  · Confusion- inability to concentrate     If you or a loved one observes any of these behaviors or has concerns about self-harm, here's what you can do:  · Talk about it- your feelings and reasons for harming yourself  · Remove any means that you might use to hurt yourself (examples: pills, rope, extension cords, firearm)  · Get professional help from the community (Mental Health, Substance Abuse, psychological counseling)  · Do not be alone:Call your Safe Contact- someone whom you trust who will be there for you.  · Call your local CRISIS HOTLINE 959-4488 or 130-923-0227  · Call your local Children's Mobile Crisis Response Team [place] (318) 962-9046 or www.[employer]  · Call the toll free National Suicide Prevention Hotlines   · National Suicide Prevention Lifeline 011-377-PCVK (4551)  · National [place] Line Network  800-SUICIDE (024-5025)           Gout  Introduction  Gout is painful swelling that can happen in some of your joints. Gout is a type of arthritis. This condition is caused by having too much uric acid in your body. Uric acid is a chemical that is made when your body breaks down substances called purines. If your body has too much uric acid, sharp crystals can form and build up in your joints. This causes pain and swelling.  Gout attacks can happen quickly and be very painful (acute gout). Over time, the attacks can affect more joints and happen more often (chronic gout).  Follow these instructions at home:  During a Gout Attack  · If directed, put ice on the painful area:  ¨ Put ice in a plastic bag.  ¨ Place a towel between your skin and the bag.  ¨ Leave the ice on for 20 minutes, 2-3 times a day.  · Rest the joint as much as possible. If the joint is in your leg, you may be given crutches to use.  · Raise (elevate) the painful joint above the level of your heart as often as you can.  · Drink enough fluids to keep your pee (urine) clear or pale yellow.  · Take over-the-counter and prescription medicines only as told by your doctor.  · Do not drive or use heavy machinery while taking prescription pain medicine.  · Follow instructions from your doctor about what you can or cannot eat and drink.  · Return to your normal activities as told by your doctor. Ask your doctor what activities are safe for you.  Avoiding Future Gout Attacks  · Follow a low-purine diet as told by a specialist (dietitian) or your doctor. Avoid foods and drinks that have a lot of purines, such as:  ¨ Liver.  ¨ Kidney.  ¨ Anchovies.  ¨ Asparagus.  ¨ Herring.  ¨ Mushrooms  ¨ Mussels.  ¨ Beer.  · Limit alcohol intake to no more than 1 drink a day for nonpregnant women and 2 drinks a day for men. One drink equals 12 oz of beer, 5 oz of wine, or 1½ oz of hard liquor.  · Stay at a healthy weight or lose weight if you are overweight. If you want to  lose weight, talk with your doctor. It is important that you do not lose weight too fast.  · Start or continue an exercise plan as told by your doctor.  · Drink enough fluids to keep your pee clear or pale yellow.  · Take over-the-counter and prescription medicines only as told by your doctor.  · Keep all follow-up visits as told by your doctor. This is important.  Contact a doctor if:  · You have another gout attack.  · You still have symptoms of a gout attack after10 days of treatment.  · You have problems (side effects) because of your medicines.  · You have chills or a fever.  · You have burning pain when you pee (urinate).  · You have pain in your lower back or belly.  Get help right away if:  · You have very bad pain.  · Your pain cannot be controlled.  · You cannot pee.  This information is not intended to replace advice given to you by your health care provider. Make sure you discuss any questions you have with your health care provider.  Document Released: 09/26/2009 Document Revised: 05/25/2017 Document Reviewed: 09/29/2016  © 2017 [employer]     Colchicine tablets or capsules  What is this medicine?  COLCHICINE (KOL chi seen) is for joint pain and swelling due to attacks of acute gouty arthritis. The medicine is also used to treat familial Mediterranean fever.  This medicine may be used for other purposes; ask your health care provider or pharmacist if you have questions.  COMMON BRAND NAME(S): Colcrys, MITIGARE  What should I tell my health care provider before I take this medicine?  They need to know if you have any of these conditions:  -anemia  -blood disorders like leukemia or lymphoma  -heart disease  -immune system problems  -intestinal disease  -kidney disease  -liver disease  -muscle pain or weakness  -take other medicines  -stomach problems  -an unusual or allergic reaction to colchicine, other medicines, lactose, foods, dyes, or preservatives  -pregnant or trying to get  pregnant  -breast-feeding  How should I use this medicine?  Take this medicine by mouth with a full glass of water. Follow the directions on the prescription label. You can take it with or without food. If it upsets your stomach, take it with food. Take your medicine at regular intervals. Do not take your medicine more often than directed.  A special MedGuide will be given to you by the pharmacist with each prescription and refill. Be sure to read this information carefully each time.  Talk to your pediatrician regarding the use of this medicine in children. While this drug may be prescribed for children as young as 4 years old for selected conditions, precautions do apply.  Patients over 65 years old may have a stronger reaction and need a smaller dose.  Overdosage: If you think you have taken too much of this medicine contact a poison control center or emergency room at once.  NOTE: This medicine is only for you. Do not share this medicine with others.  What if I miss a dose?  If you miss a dose, take it as soon as you can. If it is almost time for your next dose, take only that dose. Do not take double or extra doses.  What may interact with this medicine?  Do not take this medicine with any of the following medications:  -certain medicines for fungal infections like itraconazole  This medicine may also interact with the following medications:  -alcohol  -certain medicines for cholesterol like atorvastatin  -certain medicines for coughs and colds  -certain medicines to help you breathe better  -cyclosporine  -digoxin  -epinephrine  -grapefruit or grapefruit juice  -methenamine  -other medicines for fungal infection  -sodium bicarbonate  -some antibiotics like clarithromycin, erythromycin, and telithromycin  -some medicines for an irregular heartbeat or other heart problems  -some medicines for cancer, like lapatinib and tamoxifen  -some medicines for HIV  This list may not describe all possible interactions. Give  your health care provider a list of all the medicines, herbs, non-prescription drugs, or dietary supplements you use. Also tell them if you smoke, drink alcohol, or use illegal drugs. Some items may interact with your medicine.  What should I watch for while using this medicine?  Visit your doctor or health care professional for regular checks on your progress. You may need periodic blood checks.  Alcohol can increase the chance of getting stomach problems and gout attacks. Do not drink alcohol.  What side effects may I notice from receiving this medicine?  Side effects that you should report to your doctor or health care professional as soon as possible:  -allergic reactions like skin rash, itching or hives, swelling of the face, lips, or tongue  -fever, chills, or sore throat  -muscle tenderness, pain, or weakness  -numbness or tingling in hands or feet  -unusual bleeding or bruising  -unusually weak or tired  -vomiting  Side effects that usually do not require medical attention (report to your doctor or health care professional if they continue or are bothersome):  -diarrhea  -hair loss  -loss of appetite  -stomach pain or nausea  This list may not describe all possible side effects. Call your doctor for medical advice about side effects. You may report side effects to FDA at 1-417-FDA-2219.  Where should I keep my medicine?  Keep out of the reach of children.  Store at room temperature between 15 and 30 degrees C (59 and 86 degrees F). Keep container tightly closed. Protect from light. Throw away any unused medicine after the expiration date.  NOTE: This sheet is a summary. It may not cover all possible information. If you have questions about this medicine, talk to your doctor, pharmacist, or health care provider.  © 2018 [employer] (2014-06-16 16:48:38)       Metered Dose Inhaler (No Spacer Used)  Inhaled medicines are the basis of treatment for asthma and other breathing problems. Inhaled medicine  can only be effective if used properly. Good technique assures that the medicine reaches the lungs.  Metered dose inhalers (MDIs) are used to deliver a variety of inhaled medicines. These include quick relief or rescue medicines (such as bronchodilators) and controller medicines (such as corticosteroids). The medicine is delivered by pushing down on a metal canister to release a set amount of spray.  If you are using different kinds of inhalers, use your quick relief medicine to open the airways 10-15 minutes before using a steroid, if instructed to do so by your health care provider. If you are unsure which inhalers to use and the order of using them, ask your health care provider, nurse, or respiratory therapist.  HOW TO USE THE INHALER  1. Remove the cap from the inhaler.  2. If you are using the inhaler for the first time, you will need to prime it. Shake the inhaler for 5 seconds and release four puffs into the air, away from your face. Ask your health care provider or pharmacist if you have questions about priming your inhaler.  3. Shake the inhaler for 5 seconds before each breath in (inhalation).  4. Position the inhaler so that the top of the canister faces up.  5. Put your index finger on the top of the medicine canister. Your thumb supports the bottom of the inhaler.  6. Open your mouth.  7. Either place the inhaler between your teeth and place your lips tightly around the mouthpiece, or hold the inhaler 1-2 inches away from your open mouth. If you are unsure of which technique to use, ask your health care provider.  8. Breathe out (exhale) normally and as completely as possible.  9. Press the canister down with the index finger to release the medicine.  10. At the same time as the canister is pressed, inhale deeply and slowly until your lungs are completely filled. This should take 4-6 seconds. Keep your tongue down.  11. Hold the medicine in your lungs for 5-10 seconds (10 seconds is best). This helps  the medicine get into the small airways of your lungs.  12. Breathe out slowly, through pursed lips. Whistling is an example of pursed lips.  13. Wait at least 1 minute between puffs. Continue with the above steps until you have taken the number of puffs your health care provider has ordered. Do not use the inhaler more than your health care provider directs you to.  14. Replace the cap on the inhaler.  15. Follow the directions from your health care provider or the inhaler insert for cleaning the inhaler.  If you are using a steroid inhaler, after your last puff, rinse your mouth with water, gargle, and spit out the water. Do not swallow the water.  AVOID:  · Inhaling before or after starting the spray of medicine. It takes practice to coordinate your breathing with triggering the spray.  · Inhaling through the nose (rather than the mouth) when triggering the spray.  HOW TO DETERMINE IF YOUR INHALER IS FULL OR NEARLY EMPTY  You cannot know when an inhaler is empty by shaking it. Some inhalers are now being made with dose counters. Ask your health care provider for a prescription that has a dose counter if you feel you need that extra help. If your inhaler does not have a counter, ask your health care provider to help you determine the date you need to refill your inhaler. Write the refill date on a calendar or your inhaler canister. Refill your inhaler 7-10 days before it runs out. Be sure to keep an adequate supply of medicine. This includes making sure it has not , and making sure you have a spare inhaler.  SEEK MEDICAL CARE IF:  · Symptoms are only partially relieved with your inhaler.  · You are having trouble using your inhaler.  · You experience an increase in phlegm.  SEEK IMMEDIATE MEDICAL CARE IF:  · You feel little or no relief with your inhalers. You are still wheezing and feeling shortness of breath, tightness in your chest, or both.  · You have dizziness, headaches, or a fast heart rate.  · You  have chills, fever, or night sweats.  · There is a noticeable increase in phlegm production, or there is blood in the phlegm.  MAKE SURE YOU:  · Understand these instructions.  · Will watch your condition.  · Will get help right away if you are not doing well or get worse.     This information is not intended to replace advice given to you by your health care provider. Make sure you discuss any questions you have with your health care provider.     Document Released: 10/14/2008 Document Revised: 01/08/2016 Document Reviewed: 06/05/2014  [employer] Interactive Patient Education ©2016 [employer]        Depression / Suicide Risk    As you are discharged from this [facility] facility, it is important to learn how to keep safe from harming yourself.    Recognize the warning signs:  · Abrupt changes in personality, positive or negative- including increase in energy   · Giving away possessions  · Change in eating patterns- significant weight changes-  positive or negative  · Change in sleeping patterns- unable to sleep or sleeping all the time   · Unwillingness or inability to communicate  · Depression  · Unusual sadness, discouragement and loneliness  · Talk of wanting to die  · Neglect of personal appearance   · Rebelliousness- reckless behavior  · Withdrawal from people/activities they love  · Confusion- inability to concentrate     If you or a loved one observes any of these behaviors or has concerns about self-harm, here's what you can do:  · Talk about it- your feelings and reasons for harming yourself  · Remove any means that you might use to hurt yourself (examples: pills, rope, extension cords, firearm)  · Get professional help from the community (Mental Health, Substance Abuse, psychological counseling)  · Do not be alone:Call your Safe Contact- someone whom you trust who will be there for you.  · Call your local CRISIS HOTLINE 940-9888 or 662-961-0429  · Call your local Children's Mobile Crisis Response Team  [facility] (480) 277-3178 or www.[employer]  · Call the toll free National Suicide Prevention Hotlines   · National Suicide Prevention Lifeline 934-791-PHIH (1649)  · National Hope Line Network 800-SUICIDE (999-1475)

## 2019-10-24 NOTE — DISCHARGE SUMMARY
Internal Medicine Discharge Summary  Note Author: Segundo Null M.D.       Name Morales Olivier     1955   Age/Sex 64 y.o. male   MRN [id number]         Admit Date:  10/22/2019       Discharge Date:   10/24/19    Service:   [facility] Internal Medicine Purple Team  Attending Physician(s):   Dr. Kincaid  Senior Resident(s):   Dr. Null  Gavin Resident(s):   Dr Gonzalez  PCP: Pcp Pt States None      Primary Diagnosis:   Left knee pain (gout flare)    Secondary Diagnoses:                Principal Problem:    Gout attack POA: Yes  Active Problems:    Paroxysmal atrial fibrillation (HCC) POA: Yes    Type 2 diabetes mellitus (HCC) POA: Unknown    Dehydration POA: Unknown    Obesity (BMI 30-39.9) POA: Yes  Resolved Problems:    * No resolved hospital problems. *      Hospital Summary (Brief Narrative):         Morales Olivier is a 64 y.o. male with PMHx of A-fib (on warfarin, with past Maze and mitral valve replacement), CAD (withpacemeaker), DM-2, HTN, gout, Hx of DVT (thrombectomy ), and past kidney stones.      Presented with left knee pain.  He had a similar episode several weeks ago, and was discharged  with colchicine for 5 days.  He notes the pain has been better after using the medication, and had remained good for 2 weeks.  However presented again with left knee pain. Patient was started on colchicine 1.8 mg on day 1 and 0.6 mg subsequently. Allopurinol was continued 100 mg daily. Imaging of left knee did not show any concerning abnormality. Also as he complained of occasional low back pain , Xray LS was done which was negative for  Fracture or subluxation. His pain was managed with tylenol and tramadol PRN. PT/OT evaluation was done. He did not have any acute physical therapy needs as per PT team. However occupational therapy team recommended home health for OT and DME request for four wheel walker. Hence request for home health was placed and he was accepted by [facility]  health.   Patient's pain improved and he was able to ambulate comfortably with walker this morning. Hence he is stable for discharge with home health for occupational therapy. He was advised to follow up with PCP on discharge.      Patient /Hospital Summary (Details -- Problem Oriented) :          * Gout attack  Patient was diagnosed with Gout several years ago and is on allopurinol, He has had one flare up last month for which he was treated with colchicine.  This admission, his knee pain improved after starting colchcine. PT/OT evaluation done while inpatient, no PT needs but OT home health will follow patient on discharge  - Colchicine 0.6mg daily for 5 days  - Increase allopurinol  to 300 on discharge  - home health for OT on discharge today    Dehydration - Resolved  Creatinine back to baseline 1.2 with fluids.       Type 2 diabetes mellitus   Hx of DM-2.  No symptoms of diabetic retinopathy or neuropathy  HbA1c - 7.6,  one month ago was.  - was on ISS inpatient,   - switch back to metformin on discharge    Paroxysmal atrial fibrillation (HCC)  Patient has h/o atrial fibrillation rate controlled on metoprolol   and gets warfarin for anticoagulation.  INR 2.5  On admission EKG with atrial fibrillation/Flutter, with normal rate.   (limited eval, by Pacer taking over)  - continued metoprolol  - Continue Warfarin      Obesity (BMI 30-39.9)  Assessment & Plan  Patient with BMI 38.8, state he put on 10 lbs in last one month.  Although he eating habits remained same he put on weight.  - normal TSH, no pedal edema.  - diabetic education provided during inpatient stay  - follow-up with PCP and diet management.       Consultants:     None    Procedures:        None    Imaging/ Testing:        Xray left knee-  1.  No fracture or dislocation of LEFT knee.  2.  Mild degenerative change.  3.  Ovoid soft tissue density in the popliteal fossa may be artifact, however mass and popliteal cyst are considerations.    Xray L/S-  1.   Moderate multilevel degenerative change of lumbar spine with mild dextroconvex curvature.  2.  No fracture or subluxation.      Discharge Medications:         Medication Reconciliation: Completed       Medication List      START taking these medications      Instructions   albuterol 108 (90 Base) MCG/ACT Aers inhalation aerosol   Inhale 2 Puffs by mouth every 6 hours as needed for Shortness of Breath.  Dose:  2 Puff     colchicine 0.6 MG Tabs  Start taking on:  10/25/2019  Commonly known as:  COLCRYS   Take 1 Tab by mouth every day for 4 days.  Dose:  0.6 mg        CONTINUE taking these medications      Instructions   acetaminophen 500 MG Tabs  Commonly known as:  TYLENOL   Take 500-1,000 mg by mouth every 6 hours as needed for Moderate Pain.  Dose:  500-1,000 mg     allopurinol 100 MG Tabs  Commonly known as:  ZYLOPRIM   Take 1 Tab by mouth every day for 180 days.  Dose:  100 mg     atorvastatin 20 MG Tabs  Commonly known as:  LIPITOR   Take 1 Tab by mouth every day for 180 days.  Dose:  20 mg     benazepril 10 MG Tabs  Commonly known as:  LOTENSIN   Take 1 Tab by mouth every day for 180 days.  Dose:  10 mg     metFORMIN 500 MG Tabs  Commonly known as:  GLUCOPHAGE   Take 1 Tab by mouth 2 times a day, with meals for 180 days.  Dose:  500 mg     metoprolol 25 MG Tabs  Commonly known as:  LOPRESSOR   Take 1 Tab by mouth 2 times a day for 180 days.  Dose:  25 mg     tamsulosin 0.4 MG capsule  Commonly known as:  FLOMAX   Take 1 Cap by mouth ONE-HALF HOUR AFTER BREAKFAST for 180 days.  Dose:  0.4 mg     warfarin 5 MG Tabs  Commonly known as:  COUMADIN   Take 5-7 mg by mouth every bedtime. 7 mg on Wednesday and 5 mg all other days  Dose:  5-7 mg              Disposition:   Home with home health for OT    Diet:   Regular, but avoid red meat and alcohol (for gout)    Activity:   As tolerated, and follow-up with OT / home-health.    Instructions:      Avoid alcohol and red meat  Follow-up with home health / occupational  therapy  Follow-up with primary care physician to monitor uric acid level.    The patient was instructed to return to the ER in the event of worsening symptoms. I have counseled the patient on the importance of compliance and the patient has agreed to proceed with all medical recommendations and follow up plan indicated above.   The patient understands that all medications come with benefits and risks. Risks may include permanent injury or death and these risks can be minimized with close reassessment and monitoring.        Summary of follow up issues:   Follow-up home health / occupational therapy  Follow-up with primary care physician to monitor uric acid level.  Consider increasing allopurinol, with goal of uric acid < 4,   given presence of tophi (althohugh not currently painful).       Primary Care Provider:      Discharge summary faxed to primary care provider:  Deferred  Copy of discharge summary given to the patient: Deferred      Follow up appointment details :      Future Appointments   Date Time Provider Department Center   10/30/2019 10:30 AM [facility] EXAM 4 VMED None   12/5/2019 10:20 AM Adolph Ingram M.D. ED None     [address] [address]  [phone]  On 11/17/2019  Check in time 12:45 PM       Pending Studies:        None      Discharge Time (Minutes) :    30 mins.   Time spent on discharge day patient visit, preparing discharge paperwork and arranging for patient follow up.    Hospital Course Type:  Inpatient Stay >2 midnights      Condition on Discharge  Stable, with good prognosis.     ______________________________________________________________________      Interval history/exam for day of discharge:    - overnight no acute events, vitals stable  - Pain of left knee improving, he was able to walk to the bathroom using a walker without much trouble. ROM improving.  - No nausea, vomiting , fever or changes in bowel and bladder habits.  - patient  "received a four wheel walker, and is accepted by [facility] for Occupational therapy.      Review of Systems   Constitutional: Negative for chills and fever.   HENT: Negative for ear discharge, hearing loss, sinus pain and sore throat.    Eyes: Negative for blurred vision and pain.   Respiratory: Negative for cough, hemoptysis and shortness of breath.    Cardiovascular: Negative for chest pain, palpitations and leg swelling.   Gastrointestinal: Negative for abdominal pain, blood in stool, constipation, heartburn, nausea and vomiting.   Genitourinary: Negative for dysuria and frequency.   Musculoskeletal: Positive for joint pain. Negative for back pain, falls and myalgias.   Skin: Negative for itching and rash.   Neurological: Negative for dizziness, tingling, seizures and headaches.   Psychiatric/Behavioral: Negative for depression and memory loss. The patient does not have insomnia.      /78   Pulse 91   Temp 36.2 °C (97.2 °F) (Temporal)   Resp 17   Ht 1.778 m (5' 10\")   Wt 122.9 kg (270 lb 15.1 oz)   SpO2 98%   BMI 38.88 kg/m²       EXAM:  Head: Normocephalic and atraumatic.    Eyes:  Conjunctivae and EOM are normal.   Neck: Normal range of motion. Neck supple.   Cardiovascular: Normal rate, regular rhythm and normal heart sounds.   No murmur heard.  Pulmonary/Chest: Effort normal.   Minimal occasional right upper lobe wheeze   Abdominal: Soft. Bowel sounds are normal. There is no tenderness.   Musculoskeletal: He exhibits no edema.   Left knee erythema - over tibial tuberosity improved.   ROM improved   Neurological: A0X4, No cranial nerve deficit. He exhibits normal muscle tone.   Psychiatric: Mood, memory and affect normal.        Most Recent Labs:    Lab Results   Component Value Date/Time    WBC 5.5 10/24/2019 05:18 AM    RBC 3.98 (L) 10/24/2019 05:18 AM    HEMOGLOBIN 13.0 (L) 10/24/2019 05:18 AM    HEMATOCRIT 39.3 (L) 10/24/2019 05:18 AM    MCV 98.7 (H) 10/24/2019 05:18 AM    MCH 32.7 " 10/24/2019 05:18 AM    MCHC 33.1 (L) 10/24/2019 05:18 AM    MPV 10.3 10/24/2019 05:18 AM    NEUTSPOLYS 73.50 (H) 10/23/2019 03:07 AM    LYMPHOCYTES 14.30 (L) 10/23/2019 03:07 AM    MONOCYTES 8.60 10/23/2019 03:07 AM    EOSINOPHILS 2.90 10/23/2019 03:07 AM    BASOPHILS 0.30 10/23/2019 03:07 AM    ANISOCYTOSIS 1+ 07/09/2009 02:35 AM      Lab Results   Component Value Date/Time    SODIUM 137 10/24/2019 05:18 AM    POTASSIUM 4.3 10/24/2019 05:18 AM    CHLORIDE 108 10/24/2019 05:18 AM    CO2 21 10/24/2019 05:18 AM    GLUCOSE 105 (H) 10/24/2019 05:18 AM    BUN 20 10/24/2019 05:18 AM    CREATININE 1.18 10/24/2019 05:18 AM    CREATININE 1.4 04/27/2009 10:08 AM      Lab Results   Component Value Date/Time    ALTSGPT 16 10/23/2019 03:07 AM    ASTSGOT 15 10/23/2019 03:07 AM    ALKPHOSPHAT 58 10/23/2019 03:07 AM    TBILIRUBIN 0.6 10/23/2019 03:07 AM    LIPASE 19 12/04/2018 11:49 AM    ALBUMIN 3.8 10/23/2019 03:07 AM    GLOBULIN 2.0 10/23/2019 03:07 AM    INR 2.99 (H) 10/24/2019 05:18 AM    MACROCYTOSIS 1+ 07/06/2009 05:00 AM     Lab Results   Component Value Date/Time    PROTHROMBTM 32.2 (H) 10/24/2019 05:18 AM    INR 2.99 (H) 10/24/2019 05:18 AM

## 2019-10-24 NOTE — PROGRESS NOTES
Diabetes education: Met with pt this afternoon. Please see consult note.  Plan: Pt is concerned regarding discharge today as pain level is still high and it limits his mobility. CM to follow up with MD. Pt is working on getting a new PCP with [facility] ( branch close to where he lives) but needs to go to [place] to sign up and transportation is an issue. Pt also needs a podiatrist to trim/asses his nails. Foot care reviewed. No further needs at this time. Please call [phone] or reorder dm education if needs change.

## 2019-10-24 NOTE — PROGRESS NOTES
Inpatient Anticoagulation Service Note    Date: 10/24/2019  Reason for Anticoagulation: Atrial Fibrillation, Bioprosthetic Valve Replacement, Deep Vein Thrombosis(mitral valve (3/2017))  Hemoglobin Value: (Abnormal) 13  Hematocrit Value: (Abnormal) 39.3  Lab Platelet Value: (Abnormal) 103  Target INR: 2.0 to 3.0  INR from last 7 days     Date/Time INR Value    10/24/19 0518  (Abnormal) 2.99    10/23/19 0307  (Abnormal) 2.52    10/22/19 1034  (Abnormal) 2.43        Dose from last 7 days     Date/Time Dose (mg)    10/24/19 1419 2.5    10/23/19 1009  7    10/22/19 1548  5        Average Dose (mg): (per med rec: 7 mg PO qWed, 5 mg PO AOD)  Significant Interactions: Statin, Proton Pump Inhibitor, Other (Comments)(allopurinol)  Bridge Therapy: No  Reversal Agent Administered: Not Applicable    A/P  Therapeutic INR with increase by 0.47 in past 24 hours while on home regimen.  DDI noted above (also home medications).  H/H stable with no overt s/sx of hemorrhage.  Received higher home dose of warfarin last night.  Will give 50% of scheduled home dose tonight given rise in INR over past 24 hours (2.5 mg PO). INR tomorrow to guide subsequent dosing.     Education Material Provided?: No(chronic anticoagulation)  Pharmacist suggested discharge dosing: TBD pending INR trends, likely will be able to resume home dosing of warfarin 7 mg PO every Thursday and 5 mg PO AOD.  Recommend a follow-up PT/INR within 48-72 hours of discharge.     Danette Tafoya, PharmD, BCPS, BCCCP

## 2019-10-24 NOTE — PROGRESS NOTES
Internal Medicine Interval Note  Note Author: Kristie Liu M.D.     Name Morales Olivier     1955   Age/Sex 64 y.o. male   MRN [id number]   Code Status Full code     After 5PM or if no immediate response to page, please call for cross-coverage  Attending/Team: /Errol See Patient List for primary contact information  Call [phone] to page    1st Call - Day Intern (R1):    2nd Call - Day Sr. Resident (R2/R3):            Reason for interval visit  (Principal Problem)   Left knee pain      Interval Problem Daily Status Update  (24 hours, problem oriented, brief subjective history, new lab/imaging data pertinent to that problem)   - overnight no acute events, vitals stable  - Pain of left knee improving, he was able to walk to the bathroom using a walker without much trouble. ROM improving.  - No nausea, vomiting , fever or changes in bowel and bladder habits. Occasional very minimal wheeze in right upper lobe, patient states he doesn't have inhaler and is supposed to be on it. Will give albuterol inhaler on discharge  - patient received a four wheel walker, and is accepted by [facility] for Occupational therapy. Hence will be discharge today.          Review of Systems   Constitutional: Negative for chills and fever.   HENT: Negative for ear discharge, hearing loss, sinus pain and sore throat.    Eyes: Negative for blurred vision and pain.   Respiratory: Negative for cough, hemoptysis and shortness of breath.    Cardiovascular: Negative for chest pain, palpitations and leg swelling.   Gastrointestinal: Negative for abdominal pain, blood in stool, constipation, heartburn, nausea and vomiting.   Genitourinary: Negative for dysuria and frequency.   Musculoskeletal: Positive for joint pain. Negative for back pain, falls and myalgias.   Skin: Negative for itching and rash.   Neurological: Negative for dizziness, tingling, seizures and headaches.    Psychiatric/Behavioral: Negative for depression and memory loss. The patient does not have insomnia.        Disposition/Barriers to discharge:   potential discharge today    Consultants/Specialty  none  PCP: Pcp Pt States None      Quality Measures  Quality-Core Measures   Reviewed items::  Labs reviewed, EKG reviewed and Medications reviewed  Fragoso catheter::  No Fragoso  DVT prophylaxis pharmacological::  Warfarin (Coumadin)          Physical Exam       Vitals:    10/23/19 1600 10/23/19 2000 10/24/19 0409 10/24/19 0900   BP: 119/76 (!) 98/64 119/88 111/78   Pulse: 80 72 63 91   Resp: 17 16 16 17   Temp: 36.8 °C (98.2 °F) 36.1 °C (97 °F) 36.4 °C (97.5 °F) 36.2 °C (97.2 °F)   TempSrc: Temporal Temporal Temporal Temporal   SpO2: 95% 96% 98%    Weight:       Height:         Body mass index is 38.88 kg/m².    Oxygen Therapy:  Pulse Oximetry: 98 %, O2 (LPM): 0, O2 Delivery: None (Room Air)    Physical Exam   Constitutional: He is oriented to person, place, and time and well-developed, well-nourished, and in no distress.   HENT:   Head: Normocephalic and atraumatic.   Mouth/Throat: No oropharyngeal exudate.   Eyes: Pupils are equal, round, and reactive to light. Conjunctivae and EOM are normal.   Neck: Normal range of motion. Neck supple.   Cardiovascular: Normal rate, regular rhythm and normal heart sounds.   No murmur heard.  Pulmonary/Chest: Effort normal.   Minimal occasional right upper lobe wheeze   Abdominal: Soft. Bowel sounds are normal. There is no tenderness.   Musculoskeletal: He exhibits no edema.   Left knee erythema over tibial tuberosity improved.   ROM improved since yesterday but still limited   Neurological: He is alert and oriented to person, place, and time. No cranial nerve deficit. He exhibits normal muscle tone.   Skin: Skin is warm. No rash noted. He is not diaphoretic. No pallor.   Psychiatric: Mood, memory and affect normal.             Assessment/Plan     * Gout attack- (present on  admission)  Assessment & Plan  Patient was diagnosed with Gout several years ago and is on allopurinol, He has had one flare up last month for which he was treated with colchicine. Currently severe pain overnight with ROM limitation of left knee.  Minimal erythema, no swelling.Tophi noted on right great toe, non tender. Uric acid level 7.8, goal would be <4 in he presence of tophi. Xray left knee:  showed no fracture, except for ovoid soft tissue density in the popliteal fossa mass or popliteal cyst are considerations per report.    Plan:  - Colchicine 0.6mg daily for 5 days  - continue allopurinol 100 daily, will increase to 300 on discharge  - PT cleared patient   - Home health for OT , potential discharge today    Dehydration  Assessment & Plan  Creatinine back to baseline 1.2 with fluids.  Resolved now  Plan:  - monitor renal function      Type 2 diabetes mellitus (HCC)  Assessment & Plan  Patient has h/o Type 2 diabetes mellitus, no symptoms of diabetic retinopathy or neuropathy  HbA1c one month ago was 7.6, Blood sugars at 96 now fasting this am  Plan;  - ISS  - hypoglycemia protocol  - accu check  - continue low dose lisinopril     Paroxysmal atrial fibrillation (HCC)- (present on admission)  Assessment & Plan  Patient has h/o atrial fibrillation rate controlled on metoprolol and gets warfarin for anticoagulation. INR 2.5  EKG atrial fibrillation/Flutter, rate 81 on presentation  Plan:  - continue metoprolol  - pharmacy to dose warfarin      Obesity (BMI 30-39.9)- (present on admission)  Assessment & Plan  Patient with BMI 38.8, state she put on 10 lbs in last one month.  Although he eating habits remained same he put on weight.  - diabetic education provided during inpatient stay

## 2019-10-24 NOTE — DISCHARGE PLANNING
Bedside RN reported, Pt needs a ride home,Taxi voucher provided to Pt. Pt has no discharge questions/concerns.

## 2019-10-24 NOTE — CARE PLAN
Problem: Safety  Goal: Will remain free from injury  Outcome: PROGRESSING AS EXPECTED  Note:   Safety precautions in place. Bed in lowest and locked position. Call light and personal belongings within reach. Bed alarm in place.     Problem: Infection  Goal: Will remain free from infection  Outcome: PROGRESSING AS EXPECTED  Note:   Standard precautions in place

## 2019-10-25 ENCOUNTER — HOME CARE VISIT (OUTPATIENT)
Dept: HOME HEALTH SERVICES | Facility: HOME HEALTHCARE | Age: 64
End: 2019-10-25
Payer: MEDICARE

## 2019-10-25 VITALS
SYSTOLIC BLOOD PRESSURE: 110 MMHG | OXYGEN SATURATION: 99 % | HEIGHT: 70 IN | WEIGHT: 271 LBS | HEART RATE: 79 BPM | TEMPERATURE: 97.6 F | RESPIRATION RATE: 18 BRPM | DIASTOLIC BLOOD PRESSURE: 82 MMHG | BODY MASS INDEX: 38.8 KG/M2

## 2019-10-25 LAB
GLUCOSE BLD-MCNC: 108 MG/DL (ref 65–99)
GLUCOSE BLD-MCNC: 122 MG/DL (ref 65–99)
GLUCOSE BLD-MCNC: 88 MG/DL (ref 65–99)
GLUCOSE BLD-MCNC: 98 MG/DL (ref 65–99)

## 2019-10-25 PROCEDURE — 665999 HH PPS REVENUE DEBIT

## 2019-10-25 PROCEDURE — 665998 HH PPS REVENUE CREDIT

## 2019-10-25 PROCEDURE — G0493 RN CARE EA 15 MIN HH/HOSPICE: HCPCS

## 2019-10-25 PROCEDURE — 665001 SOC-HOME HEALTH

## 2019-10-25 ASSESSMENT — ENCOUNTER SYMPTOMS
DEBILITATING PAIN: 1
NAUSEA: DENIES
VOMITING: DENIES

## 2019-10-25 ASSESSMENT — PATIENT HEALTH QUESTIONNAIRE - PHQ9
CLINICAL INTERPRETATION OF PHQ2 SCORE: 0
1. LITTLE INTEREST OR PLEASURE IN DOING THINGS: 00
2. FEELING DOWN, DEPRESSED, IRRITABLE, OR HOPELESS: 00

## 2019-10-26 PROCEDURE — 665999 HH PPS REVENUE DEBIT

## 2019-10-26 PROCEDURE — 665998 HH PPS REVENUE CREDIT

## 2019-10-27 PROCEDURE — 665999 HH PPS REVENUE DEBIT

## 2019-10-27 PROCEDURE — 665998 HH PPS REVENUE CREDIT

## 2019-10-28 ENCOUNTER — ANTICOAGULATION MONITORING (OUTPATIENT)
Dept: MEDICAL GROUP | Facility: PHYSICIAN GROUP | Age: 64
End: 2019-10-28

## 2019-10-28 DIAGNOSIS — I82.90 DEEP VEIN THROMBOSIS (DVT) OF NON-EXTREMITY VEIN, UNSPECIFIED CHRONICITY: ICD-10-CM

## 2019-10-28 DIAGNOSIS — Z95.2 H/O MITRAL VALVE REPLACEMENT: ICD-10-CM

## 2019-10-28 PROCEDURE — 665998 HH PPS REVENUE CREDIT

## 2019-10-28 PROCEDURE — 665999 HH PPS REVENUE DEBIT

## 2019-10-28 NOTE — PROGRESS NOTES
Anticoagulation Summary  As of 10/28/2019    INR goal:   2.5-3.5   TTR:   44.3 % (2.7 mo)   INR used for dosin.99 (10/24/2019)   Warfarin maintenance plan:   7 mg (5 mg x 1 and 2 mg x 1) every Wed; 5 mg (5 mg x 1) all other days   Weekly warfarin total:   37 mg   Plan last modified:   SHARMILA Bob (10/16/2019)   Next INR check:   10/30/2019   Target end date:   Indefinite    Indications    H/O mitral valve replacement [Z95.2]  A-fib (HCC) (Resolved) [I48.91]  Deep vein thrombosis (HCC) [I82.409] [I82.409]             Anticoagulation Episode Summary     INR check location:       Preferred lab:       Send INR reminders to:       Comments:         Anticoagulation Care Providers     Provider Role Specialty Phone number    Sumanth Yancey M.D. Referring Cardiology [phone]    [facility] Responsible  [phone]        Anticoagulation Patient Findings      Spoke with patient to report a therapeutic INR.    Pt instructed to continue with current warfarin dosing regimen, confirms dosing.   Pt denies any s/s of bleeding, bruising, clotting or any changes to diet or medication.    Will follow up in clinic in 2 days.     Yudi Engle, CbD

## 2019-10-29 ENCOUNTER — HOME CARE VISIT (OUTPATIENT)
Dept: HOME HEALTH SERVICES | Facility: HOME HEALTHCARE | Age: 64
End: 2019-10-29
Payer: MEDICARE

## 2019-10-29 PROCEDURE — 665999 HH PPS REVENUE DEBIT

## 2019-10-29 PROCEDURE — 665998 HH PPS REVENUE CREDIT

## 2019-10-29 PROCEDURE — G0152 HHCP-SERV OF OT,EA 15 MIN: HCPCS

## 2019-10-30 ENCOUNTER — ANTICOAGULATION VISIT (OUTPATIENT)
Dept: VASCULAR LAB | Facility: MEDICAL CENTER | Age: 64
End: 2019-10-30
Attending: INTERNAL MEDICINE
Payer: MEDICARE

## 2019-10-30 VITALS
SYSTOLIC BLOOD PRESSURE: 108 MMHG | OXYGEN SATURATION: 98 % | RESPIRATION RATE: 18 BRPM | TEMPERATURE: 98.4 F | DIASTOLIC BLOOD PRESSURE: 62 MMHG | HEART RATE: 100 BPM

## 2019-10-30 DIAGNOSIS — Z23 NEED FOR VACCINATION: ICD-10-CM

## 2019-10-30 DIAGNOSIS — Z95.2 H/O MITRAL VALVE REPLACEMENT: ICD-10-CM

## 2019-10-30 LAB
INR BLD: 2.6 (ref 0.9–1.2)
INR PPP: 2.6 (ref 2–3.5)

## 2019-10-30 PROCEDURE — 99211 OFF/OP EST MAY X REQ PHY/QHP: CPT

## 2019-10-30 PROCEDURE — 85610 PROTHROMBIN TIME: CPT

## 2019-10-30 PROCEDURE — 665998 HH PPS REVENUE CREDIT

## 2019-10-30 PROCEDURE — 90471 IMMUNIZATION ADMIN: CPT

## 2019-10-30 PROCEDURE — 665999 HH PPS REVENUE DEBIT

## 2019-10-30 ASSESSMENT — ACTIVITIES OF DAILY LIVING (ADL)
DRESSING_UB_ASSISTANCE: 0
MEAL_PREP_ASSISTANCE: 0
DRESSING_LB_ASSISTANCE: 0
BATHING_ASSISTANCE: 0
SHOPPING_ASSISTANCE: 0
TOILETING_ASSISTANCE: 0
HOUSEKEEPING_ASSISTANCE: 0
EATING_ASSISTANCE: 0
GROOMING_COMMENTS: STANDING AT SINK
GROOMING_ASSISTANCE: 0
TELEPHONE_ASSISTANCE: 0
TRANSPORTATION_ASSISTANCE: 6
TOILETING_EQUIPMENT_USED: ELEVATED TOILET
LAUNDRY_ASSISTANCE: 0
ORAL_CARE_ASSISTANCE: 0

## 2019-10-30 ASSESSMENT — ENCOUNTER SYMPTOMS: DEBILITATING PAIN: 1

## 2019-10-30 NOTE — PROGRESS NOTES
Anticoagulation Summary  As of 10/30/2019    INR goal:   2.5-3.5   TTR:   44.3 % (2.7 mo)   INR used for dosin.60 (10/30/2019)   Warfarin maintenance plan:   7 mg (5 mg x 1 and 2 mg x 1) every Wed; 5 mg (5 mg x 1) all other days   Weekly warfarin total:   37 mg   Plan last modified:   SHARMILA Bob (10/16/2019)   Next INR check:   2019   Target end date:   Indefinite    Indications    H/O mitral valve replacement [Z95.2]  A-fib (HCC) (Resolved) [I48.91]  Deep vein thrombosis (HCC) [I82.409] [I82.409]             Anticoagulation Episode Summary     INR check location:       Preferred lab:       Send INR reminders to:       Comments:         Anticoagulation Care Providers     Provider Role Specialty Phone number    Sumanth Yancey M.D. Referring Cardiology [phone]    [facility] Responsible  [phone]        Anticoagulation Patient Findings      HPI:  Morales Olivier seen in clinic today, on anticoagulation therapy with warfarin for mitral valve replacement.   Changes to current medical/health status since last appt: none  Denies signs/symptoms of bleeding and/or thrombosis since the last appt.    Denies any interval changes to diet  Denies any interval changes to medications since last appt.   Denies any complications or cost restrictions with current therapy.   BP recorded in vitals.  Confirmed dosing regimen.  Pt consented to receiving immunization today.     A/P   INR  therapeutic.   Pt is to continue with current warfarin dosing regimen.     Follow up appointment in 2 week(s).    Adolph Arnett, PharmD

## 2019-10-31 ENCOUNTER — HOME CARE VISIT (OUTPATIENT)
Dept: HOME HEALTH SERVICES | Facility: HOME HEALTHCARE | Age: 64
End: 2019-10-31
Payer: MEDICARE

## 2019-10-31 VITALS
HEART RATE: 89 BPM | OXYGEN SATURATION: 96 % | DIASTOLIC BLOOD PRESSURE: 62 MMHG | SYSTOLIC BLOOD PRESSURE: 93 MMHG | TEMPERATURE: 98.4 F | RESPIRATION RATE: 18 BRPM

## 2019-10-31 PROCEDURE — G0151 HHCP-SERV OF PT,EA 15 MIN: HCPCS

## 2019-10-31 PROCEDURE — 665999 HH PPS REVENUE DEBIT

## 2019-10-31 PROCEDURE — 665998 HH PPS REVENUE CREDIT

## 2019-10-31 ASSESSMENT — ACTIVITIES OF DAILY LIVING (ADL)
ADLS_COMMENTS: <!--EPICS-->SEE OT EVAL<!--EPICE-->
IADLS_COMMENTS: <!--EPICS-->SEE OT EVAL<!--EPICE-->

## 2019-11-01 PROCEDURE — 665998 HH PPS REVENUE CREDIT

## 2019-11-01 PROCEDURE — 665999 HH PPS REVENUE DEBIT

## 2019-11-02 PROCEDURE — 665998 HH PPS REVENUE CREDIT

## 2019-11-02 PROCEDURE — 665999 HH PPS REVENUE DEBIT

## 2019-11-03 PROCEDURE — 665999 HH PPS REVENUE DEBIT

## 2019-11-03 PROCEDURE — 665998 HH PPS REVENUE CREDIT

## 2019-11-04 ENCOUNTER — HOME CARE VISIT (OUTPATIENT)
Dept: HOME HEALTH SERVICES | Facility: HOME HEALTHCARE | Age: 64
End: 2019-11-04
Payer: MEDICARE

## 2019-11-04 VITALS
OXYGEN SATURATION: 98 % | HEART RATE: 89 BPM | RESPIRATION RATE: 8 BRPM | SYSTOLIC BLOOD PRESSURE: 98 MMHG | DIASTOLIC BLOOD PRESSURE: 60 MMHG | TEMPERATURE: 98.7 F

## 2019-11-04 PROCEDURE — G0152 HHCP-SERV OF OT,EA 15 MIN: HCPCS

## 2019-11-04 PROCEDURE — 665999 HH PPS REVENUE DEBIT

## 2019-11-04 PROCEDURE — 665998 HH PPS REVENUE CREDIT

## 2019-11-04 ASSESSMENT — ENCOUNTER SYMPTOMS: DEBILITATING PAIN: 1

## 2019-11-05 ENCOUNTER — HOME CARE VISIT (OUTPATIENT)
Dept: HOME HEALTH SERVICES | Facility: HOME HEALTHCARE | Age: 64
End: 2019-11-05
Payer: MEDICARE

## 2019-11-05 VITALS
SYSTOLIC BLOOD PRESSURE: 95 MMHG | HEART RATE: 80 BPM | RESPIRATION RATE: 18 BRPM | TEMPERATURE: 98 F | DIASTOLIC BLOOD PRESSURE: 67 MMHG | OXYGEN SATURATION: 98 %

## 2019-11-05 PROCEDURE — G0151 HHCP-SERV OF PT,EA 15 MIN: HCPCS

## 2019-11-05 PROCEDURE — 665999 HH PPS REVENUE DEBIT

## 2019-11-05 PROCEDURE — G0155 HHCP-SVS OF CSW,EA 15 MIN: HCPCS

## 2019-11-05 PROCEDURE — 665998 HH PPS REVENUE CREDIT

## 2019-11-05 ASSESSMENT — ACTIVITIES OF DAILY LIVING (ADL)
AMBULATION_DISTANCE/DURATION_TOLERATED: 300 FEET
OASIS_M1830: 03
OASIS_M1830: 01
AMBULATION ASSISTANCE ON UNEVEN SURFACES: 1
HOME_HEALTH_OASIS: 00

## 2019-11-05 ASSESSMENT — ENCOUNTER SYMPTOMS: SEVERE DYSPNEA: 1

## 2019-11-06 PROCEDURE — 665998 HH PPS REVENUE CREDIT

## 2019-11-06 PROCEDURE — 665999 HH PPS REVENUE DEBIT

## 2019-11-07 PROCEDURE — 665999 HH PPS REVENUE DEBIT

## 2019-11-07 PROCEDURE — 665998 HH PPS REVENUE CREDIT

## 2019-11-08 PROCEDURE — 665998 HH PPS REVENUE CREDIT

## 2019-11-08 PROCEDURE — 665999 HH PPS REVENUE DEBIT

## 2019-11-09 PROCEDURE — 665998 HH PPS REVENUE CREDIT

## 2019-11-09 PROCEDURE — 665999 HH PPS REVENUE DEBIT

## 2019-11-10 PROCEDURE — 665998 HH PPS REVENUE CREDIT

## 2019-11-10 PROCEDURE — 665999 HH PPS REVENUE DEBIT

## 2019-11-11 PROCEDURE — 665998 HH PPS REVENUE CREDIT

## 2019-11-11 PROCEDURE — 665999 HH PPS REVENUE DEBIT

## 2019-11-12 PROCEDURE — 665998 HH PPS REVENUE CREDIT

## 2019-11-12 PROCEDURE — 665999 HH PPS REVENUE DEBIT

## 2019-11-13 PROCEDURE — 665999 HH PPS REVENUE DEBIT

## 2019-11-13 PROCEDURE — 665998 HH PPS REVENUE CREDIT

## 2019-11-14 ENCOUNTER — HOSPITAL ENCOUNTER (OUTPATIENT)
Dept: LAB | Facility: MEDICAL CENTER | Age: 64
End: 2019-11-14
Attending: FAMILY MEDICINE
Payer: MEDICARE

## 2019-11-14 ENCOUNTER — ANTICOAGULATION VISIT (OUTPATIENT)
Dept: VASCULAR LAB | Facility: MEDICAL CENTER | Age: 64
End: 2019-11-14
Attending: INTERNAL MEDICINE
Payer: MEDICARE

## 2019-11-14 VITALS — HEART RATE: 70 BPM | DIASTOLIC BLOOD PRESSURE: 52 MMHG | SYSTOLIC BLOOD PRESSURE: 96 MMHG

## 2019-11-14 DIAGNOSIS — I10 ESSENTIAL HYPERTENSION, BENIGN: ICD-10-CM

## 2019-11-14 DIAGNOSIS — Z95.2 H/O MITRAL VALVE REPLACEMENT: ICD-10-CM

## 2019-11-14 DIAGNOSIS — E11.8 TYPE 2 DIABETES MELLITUS WITH COMPLICATION, WITHOUT LONG-TERM CURRENT USE OF INSULIN (HCC): ICD-10-CM

## 2019-11-14 DIAGNOSIS — N40.0 BENIGN PROSTATIC HYPERPLASIA WITHOUT LOWER URINARY TRACT SYMPTOMS: ICD-10-CM

## 2019-11-14 DIAGNOSIS — M1A.09X0 CHRONIC GOUT OF MULTIPLE SITES, UNSPECIFIED CAUSE: ICD-10-CM

## 2019-11-14 LAB
ALBUMIN SERPL BCP-MCNC: 4.4 G/DL (ref 3.2–4.9)
ALBUMIN/GLOB SERPL: 2.2 G/DL
ALP SERPL-CCNC: 65 U/L (ref 30–99)
ALT SERPL-CCNC: 19 U/L (ref 2–50)
ANION GAP SERPL CALC-SCNC: 9 MMOL/L (ref 0–11.9)
AST SERPL-CCNC: 16 U/L (ref 12–45)
BILIRUB SERPL-MCNC: 0.5 MG/DL (ref 0.1–1.5)
BUN SERPL-MCNC: 23 MG/DL (ref 8–22)
CALCIUM SERPL-MCNC: 9 MG/DL (ref 8.5–10.5)
CHLORIDE SERPL-SCNC: 110 MMOL/L (ref 96–112)
CO2 SERPL-SCNC: 26 MMOL/L (ref 20–33)
CREAT SERPL-MCNC: 1.2 MG/DL (ref 0.5–1.4)
CREAT UR-MCNC: 181.2 MG/DL
ERYTHROCYTE [DISTWIDTH] IN BLOOD BY AUTOMATED COUNT: 51.2 FL (ref 35.9–50)
EST. AVERAGE GLUCOSE BLD GHB EST-MCNC: 151 MG/DL
GLOBULIN SER CALC-MCNC: 2 G/DL (ref 1.9–3.5)
GLUCOSE SERPL-MCNC: 123 MG/DL (ref 65–99)
HBA1C MFR BLD: 6.9 % (ref 0–5.6)
HCT VFR BLD AUTO: 44.1 % (ref 42–52)
HGB BLD-MCNC: 14.5 G/DL (ref 14–18)
INR BLD: 2.9 (ref 0.9–1.2)
INR PPP: 2.9 (ref 2–3.5)
MCH RBC QN AUTO: 33 PG (ref 27–33)
MCHC RBC AUTO-ENTMCNC: 32.9 G/DL (ref 33.7–35.3)
MCV RBC AUTO: 100.5 FL (ref 81.4–97.8)
MICROALBUMIN UR-MCNC: 2.9 MG/DL
MICROALBUMIN/CREAT UR: 16 MG/G (ref 0–30)
PLATELET # BLD AUTO: 165 K/UL (ref 164–446)
PMV BLD AUTO: 10.4 FL (ref 9–12.9)
POTASSIUM SERPL-SCNC: 5.1 MMOL/L (ref 3.6–5.5)
PROT SERPL-MCNC: 6.4 G/DL (ref 6–8.2)
PSA SERPL-MCNC: 0.24 NG/ML (ref 0–4)
RBC # BLD AUTO: 4.39 M/UL (ref 4.7–6.1)
SODIUM SERPL-SCNC: 145 MMOL/L (ref 135–145)
TSH SERPL DL<=0.005 MIU/L-ACNC: 4.36 UIU/ML (ref 0.38–5.33)
URATE SERPL-MCNC: 6.3 MG/DL (ref 2.5–8.3)
WBC # BLD AUTO: 5.7 K/UL (ref 4.8–10.8)

## 2019-11-14 PROCEDURE — 665999 HH PPS REVENUE DEBIT

## 2019-11-14 PROCEDURE — 99211 OFF/OP EST MAY X REQ PHY/QHP: CPT

## 2019-11-14 PROCEDURE — 85610 PROTHROMBIN TIME: CPT

## 2019-11-14 PROCEDURE — 665998 HH PPS REVENUE CREDIT

## 2019-11-14 PROCEDURE — 36415 COLL VENOUS BLD VENIPUNCTURE: CPT

## 2019-11-14 PROCEDURE — 80053 COMPREHEN METABOLIC PANEL: CPT

## 2019-11-14 PROCEDURE — 84153 ASSAY OF PSA TOTAL: CPT | Mod: GA

## 2019-11-14 PROCEDURE — 83036 HEMOGLOBIN GLYCOSYLATED A1C: CPT

## 2019-11-14 PROCEDURE — 82043 UR ALBUMIN QUANTITATIVE: CPT

## 2019-11-14 PROCEDURE — 84443 ASSAY THYROID STIM HORMONE: CPT

## 2019-11-14 PROCEDURE — 85027 COMPLETE CBC AUTOMATED: CPT

## 2019-11-14 PROCEDURE — 82570 ASSAY OF URINE CREATININE: CPT

## 2019-11-14 PROCEDURE — 84550 ASSAY OF BLOOD/URIC ACID: CPT

## 2019-11-14 NOTE — PROGRESS NOTES
Anticoagulation Summary  As of 2019    INR goal:   2.5-3.5   TTR:   53.0 % (3.2 mo)   INR used for dosin.90 (2019)   Warfarin maintenance plan:   7 mg (5 mg x 1 and 2 mg x 1) every Wed; 5 mg (5 mg x 1) all other days   Weekly warfarin total:   37 mg   Plan last modified:   SHARMILA Bob (10/16/2019)   Next INR check:   2019   Target end date:   Indefinite    Indications    H/O mitral valve replacement [Z95.2]  A-fib (HCC) (Resolved) [I48.91]  Deep vein thrombosis (HCC) [I82.409] [I82.409]             Anticoagulation Episode Summary     INR check location:       Preferred lab:       Send INR reminders to:       Comments:         Anticoagulation Care Providers     Provider Role Specialty Phone number    Sumanth Yancey M.D. Referring Cardiology [phone]    [facility] Responsible  [phone]        Anticoagulation Patient Findings      HPI:  Morales Olivier seen in clinic today, on anticoagulation therapy with warfarin for mitral valve replacement.   Changes to current medical/health status since last appt: none  Denies signs/symptoms of bleeding and/or thrombosis since the last appt.    Denies any interval changes to diet  Denies any interval changes to medications since last appt.   Denies any complications or cost restrictions with current therapy.   BP recorded in vitals.  BP is low today, pt sees our vascular physician for HTN.  Provided pt with home BP prescription.  Pt was also given a BP log to bring to his next visit with vascular.     Denies s/s of hypotension.     A/P   INR  therapeutic.   Pt is to continue with current warfarin dosing regimen.     Follow up appointment in 3 week(s).    Adolph Arnett, PharmD

## 2019-11-15 PROCEDURE — 665998 HH PPS REVENUE CREDIT

## 2019-11-15 PROCEDURE — 665999 HH PPS REVENUE DEBIT

## 2019-11-16 PROCEDURE — 665998 HH PPS REVENUE CREDIT

## 2019-11-16 PROCEDURE — 665999 HH PPS REVENUE DEBIT

## 2019-11-17 PROCEDURE — 665999 HH PPS REVENUE DEBIT

## 2019-11-17 PROCEDURE — 665998 HH PPS REVENUE CREDIT

## 2019-11-18 PROCEDURE — 665998 HH PPS REVENUE CREDIT

## 2019-11-18 PROCEDURE — 665999 HH PPS REVENUE DEBIT

## 2019-11-19 PROCEDURE — 665999 HH PPS REVENUE DEBIT

## 2019-11-19 PROCEDURE — 665998 HH PPS REVENUE CREDIT

## 2019-11-20 PROCEDURE — 665998 HH PPS REVENUE CREDIT

## 2019-11-20 PROCEDURE — 665999 HH PPS REVENUE DEBIT

## 2019-11-21 PROCEDURE — G0180 MD CERTIFICATION HHA PATIENT: HCPCS | Performed by: INTERNAL MEDICINE

## 2019-11-21 PROCEDURE — 665998 HH PPS REVENUE CREDIT

## 2019-11-21 PROCEDURE — 665999 HH PPS REVENUE DEBIT

## 2019-12-05 ENCOUNTER — ANTICOAGULATION VISIT (OUTPATIENT)
Dept: VASCULAR LAB | Facility: MEDICAL CENTER | Age: 64
End: 2019-12-05
Attending: INTERNAL MEDICINE
Payer: MEDICARE

## 2019-12-05 ENCOUNTER — OFFICE VISIT (OUTPATIENT)
Dept: VASCULAR LAB | Facility: MEDICAL CENTER | Age: 64
End: 2019-12-05
Attending: FAMILY MEDICINE
Payer: MEDICARE

## 2019-12-05 VITALS
DIASTOLIC BLOOD PRESSURE: 69 MMHG | SYSTOLIC BLOOD PRESSURE: 92 MMHG | HEART RATE: 89 BPM | HEIGHT: 70 IN | WEIGHT: 264.2 LBS | BODY MASS INDEX: 37.82 KG/M2

## 2019-12-05 DIAGNOSIS — Z95.2 H/O MITRAL VALVE REPLACEMENT: ICD-10-CM

## 2019-12-05 DIAGNOSIS — Z79.01 CHRONIC ANTICOAGULATION: ICD-10-CM

## 2019-12-05 DIAGNOSIS — I10 ESSENTIAL HYPERTENSION, BENIGN: ICD-10-CM

## 2019-12-05 DIAGNOSIS — E11.8 TYPE 2 DIABETES MELLITUS WITH COMPLICATION, WITHOUT LONG-TERM CURRENT USE OF INSULIN (HCC): ICD-10-CM

## 2019-12-05 DIAGNOSIS — Z95.0 CARDIAC PACEMAKER IN SITU: ICD-10-CM

## 2019-12-05 DIAGNOSIS — I48.91 ATRIAL FIBRILLATION, UNSPECIFIED TYPE (HCC): ICD-10-CM

## 2019-12-05 DIAGNOSIS — I82.90 DEEP VEIN THROMBOSIS (DVT) OF NON-EXTREMITY VEIN, UNSPECIFIED CHRONICITY: ICD-10-CM

## 2019-12-05 DIAGNOSIS — M1A.09X0 CHRONIC GOUT OF MULTIPLE SITES, UNSPECIFIED CAUSE: ICD-10-CM

## 2019-12-05 LAB — INR PPP: 2.9 (ref 2–3.5)

## 2019-12-05 PROCEDURE — 85610 PROTHROMBIN TIME: CPT | Performed by: FAMILY MEDICINE

## 2019-12-05 PROCEDURE — 99214 OFFICE O/P EST MOD 30 MIN: CPT | Performed by: FAMILY MEDICINE

## 2019-12-05 PROCEDURE — 99213 OFFICE O/P EST LOW 20 MIN: CPT | Performed by: FAMILY MEDICINE

## 2019-12-05 ASSESSMENT — ENCOUNTER SYMPTOMS
MYALGIAS: 0
DIZZINESS: 0
CHILLS: 0
SHORTNESS OF BREATH: 0
ABDOMINAL PAIN: 0
NERVOUS/ANXIOUS: 0
HEADACHES: 0
NAUSEA: 0
POLYDIPSIA: 0
TREMORS: 0
BLOOD IN STOOL: 0
FEVER: 0
BRUISES/BLEEDS EASILY: 0
WHEEZING: 0
DIARRHEA: 0
SORE THROAT: 0
FOCAL WEAKNESS: 0
WEAKNESS: 0
DOUBLE VISION: 0
ORTHOPNEA: 0
FLANK PAIN: 0
DEPRESSION: 0
SEIZURES: 0
BLURRED VISION: 0
VOMITING: 0
PALPITATIONS: 0
INSOMNIA: 0
HEMOPTYSIS: 0
COUGH: 0

## 2019-12-05 NOTE — PROGRESS NOTES
FOLLOW-UP VASCULAR VISIT  Subjective:   Morales Olivier is a 64 y.o. male who presents today 19 for   Chief Complaint   Patient presents with   • Follow-Up     Hypertension   Referred for LOT determination of anticoagulation in context of acute venothromboembolic disease    HPI:    Anticoagulation:   Current symptoms:  Denies current SOB, CP, leg swelling, edema, leg pain, cyanosis of digits, redness of extremities.  Anticoagulant: VKA, tolerating well, no bleeding or bruising   Date of initiation of anticoagulation: lifelong   Indications: Afib, s/p MVR   Anticoagulation Summary  As of 2019    INR goal:   2.5-3.5   TTR:   53.0 % (3.2 mo)   INR used for dosin.90 (2019)   Warfarin maintenance plan:   7 mg (5 mg x 1 and 2 mg x 1) every Wed; 5 mg (5 mg x 1) all other days   Weekly warfarin total:   37 mg   Plan last modified:   Yelena Carreon AHasmukhPHasmukhNHasmukh (10/16/2019)   Next INR check:   2019   Target end date:   Indefinite    Indications    H/O mitral valve replacement [Z95.2]  A-fib (HCC) (Resolved) [I48.91]  Deep vein thrombosis (HCC) [I82.409] [I82.409]              T2D:  A1c = 6.8% currently.   No current symptoms.  Not taking metformin, had in the past.   Needs refill.     HTN:  Current HTN concerns: reports low BP currently, would like to stop benazepril if possible.  Taking metoprolol.   ADRs: No  Recent studies/labs completed (reviewed with patient, noted below): Yes  HTN sx:  No current blurred or changed vision, chest pain, shortness of breath, headache, nausea, dizziness/vertigo   Home BP log:  Not checking, no cuff   24h ABPM completed: not tested  Adherence to current HTN meds: compliant all of the time     Hyperlipidemia:    Wouldl like to stop atorva.    Current treatment: Moderate intensity statin atorva 20mg   Myalgias? No  Other adverse drug reactions? fatigue  Lipid profile: as noted     BPH:  Stable on flomax.  No urology f/u.  PSA normal.     Social History     Tobacco  Use   Smoking Status Former Smoker   • Last attempt to quit: 1981   • Years since quittin.9   Smokeless Tobacco Never Used     Social History     Tobacco Use   • Smoking status: Former Smoker     Last attempt to quit: 1981     Years since quittin.9   • Smokeless tobacco: Never Used   Substance Use Topics   • Alcohol use: No   • Drug use: No     Outpatient Encounter Medications as of 2019   Medication Sig Dispense Refill   • calcium carbonate (TUMS) 500 MG Chew Tab Take 500 mg by mouth as needed. Indications: Acid Indigestion, Heartburn, Sour Stomach     • allopurinol (ZYLOPRIM) 100 MG Tab Take 1 Tab by mouth every day for 180 days. 30 Tab 5   • albuterol 108 (90 Base) MCG/ACT Aero Soln inhalation aerosol Inhale 2 Puffs by mouth every 6 hours as needed for Shortness of Breath. 8.5 g 0   • warfarin (COUMADIN) 5 MG Tab Take 5-7 mg by mouth every bedtime. 7 mg on Wednesday and 5 mg all other days     • tamsulosin (FLOMAX) 0.4 MG capsule Take 1 Cap by mouth ONE-HALF HOUR AFTER BREAKFAST for 180 days. 90 Cap 1   • metFORMIN (GLUCOPHAGE) 500 MG Tab Take 1 Tab by mouth 2 times a day, with meals for 180 days. 180 Tab 1   • metoprolol (LOPRESSOR) 25 MG Tab Take 1 Tab by mouth 2 times a day for 180 days. 180 Tab 1   • acetaminophen (TYLENOL) 500 MG Tab Take 500-1,000 mg by mouth every 6 hours as needed for Moderate Pain.     • Blood Pressure Monitoring (BLOOD PRESSURE KIT) Device 1 Each by Does not apply route every day. (Patient not taking: Reported on 2019) 1 Device 0   • [DISCONTINUED] benazepril (LOTENSIN) 10 MG Tab Take 1 Tab by mouth every day for 180 days. 90 Tab 1   • [DISCONTINUED] atorvastatin (LIPITOR) 20 MG Tab Take 1 Tab by mouth every day for 180 days. 90 Tab 1     No facility-administered encounter medications on file as of 2019.      No Known Allergies  DIET AND EXERCISE:  Weight Change:increasing   BMI Readings from Last 5 Encounters:   19 37.91 kg/m²   10/25/19 38.88  "kg/m²   10/22/19 38.88 kg/m²   10/16/19 38.88 kg/m²   09/25/19 38.43 kg/m²      Diet: low fat, low carbohydrate  Exercise: sporadic irregular exercise     Review of Systems   Constitutional: Positive for malaise/fatigue. Negative for chills and fever.   HENT: Negative for nosebleeds, sore throat and tinnitus.    Eyes: Negative for blurred vision and double vision.   Respiratory: Negative for cough, hemoptysis, shortness of breath and wheezing.    Cardiovascular: Negative for chest pain, palpitations, orthopnea and leg swelling.   Gastrointestinal: Negative for abdominal pain, blood in stool, diarrhea, melena, nausea and vomiting.   Genitourinary: Negative for dysuria, flank pain, frequency, hematuria and urgency.   Musculoskeletal: Positive for joint pain (mild knee pain). Negative for myalgias.   Skin: Negative for itching and rash.   Neurological: Negative for dizziness, tremors, focal weakness, seizures, weakness and headaches.   Endo/Heme/Allergies: Negative for polydipsia. Does not bruise/bleed easily.   Psychiatric/Behavioral: Negative for depression. The patient is not nervous/anxious and does not have insomnia.       Objective:     Vitals:    12/05/19 1013   BP: (!) 92/69   BP Location: Left arm   Patient Position: Sitting   BP Cuff Size: Adult   Pulse: 89   Weight: 119.8 kg (264 lb 3.2 oz)   Height: 1.778 m (5' 10\")      BP Readings from Last 5 Encounters:   12/05/19 (!) 92/69   11/14/19 (!) 96/52   11/05/19 (!) 95/67   11/04/19 (!) 98/60   10/31/19 (!) 93/62      Body mass index is 37.91 kg/m².  Physical Exam   Constitutional: He is oriented to person, place, and time. He appears well-developed and well-nourished. He is cooperative. No distress.   HENT:   Head: Normocephalic and atraumatic.   Mouth/Throat: Oropharynx is clear and moist and mucous membranes are normal.   Eyes: Pupils are equal, round, and reactive to light. Conjunctivae are normal.   Neck: Trachea normal and normal range of motion. Neck " supple. Normal carotid pulses and no JVD present. Carotid bruit is not present. No thyromegaly present.   Cardiovascular: Normal rate, regular rhythm, normal heart sounds and intact distal pulses. Exam reveals no gallop and no friction rub.   No murmur heard.  Pulses:       Carotid pulses are 2+ on the right side and 2+ on the left side.       Radial pulses are 2+ on the right side and 2+ on the left side.        Dorsalis pedis pulses are 2+ on the right side and 2+ on the left side.        Posterior tibial pulses are 2+ on the right side and 2+ on the left side.   Edema:    RLE: none     LLE: none   Spider telangectasia:       RLE:  None      LLE: none   Varicosities:         RLE: none      LLE: none   Corona phlebectatica:      RLE:  None        LLE:  None   Cording:         RLE:  None     LLE: None    Pulmonary/Chest: Effort normal and breath sounds normal. No respiratory distress.   Abdominal: Soft. Bowel sounds are normal. He exhibits no distension and no mass. There is no hepatosplenomegaly. There is no tenderness. There is no rebound and no guarding.   Musculoskeletal:         General: No edema.   Lymphadenopathy:     He has no cervical adenopathy.   Neurological: He is alert and oriented to person, place, and time. No cranial nerve deficit. Coordination and gait normal.   Skin: Skin is warm and dry. He is not diaphoretic. No cyanosis. No pallor. Nails show no clubbing.   Psychiatric: He has a normal mood and affect.     Lab Results   Component Value Date    CHOLSTRLTOT 68 (L) 10/23/2019    LDL 24 10/23/2019    HDL 20 (A) 10/23/2019    TRIGLYCERIDE 119 10/23/2019      Lab Results   Component Value Date    PROTHROMBTM 32.2 (H) 10/24/2019    INR 2.90 12/05/2019       Lab Results   Component Value Date    HBA1C 6.9 (H) 11/14/2019      Lab Results   Component Value Date    SODIUM 145 11/14/2019    POTASSIUM 5.1 11/14/2019    CHLORIDE 110 11/14/2019    CO2 26 11/14/2019    GLUCOSE 123 (H) 11/14/2019    BUN 23 (H)  2019    CREATININE 1.20 2019    IFAFRICA >60 2019    IFNOTAFR >60 2019        Lab Results   Component Value Date    WBC 5.7 2019    RBC 4.39 (L) 2019    HEMOGLOBIN 14.5 2019    HEMATOCRIT 44.1 2019    .5 (H) 2019    MCH 33.0 2019    MCHC 32.9 (L) 2019    MPV 10.4 2019      VASCULAR IMAGING:     Last EKG:   Results for orders placed or performed during the hospital encounter of 10/22/19   EKG   Result Value Ref Range    Report       [facility] Emergency Dept.    Test Date:  2019-10-22  Pt Name:    AILYN MCINTOSH                 Department: ER  MRN:        [id number]                      Room:        13  Gender:     Male                         Technician: [id number]  :        [id number]                   Requested By:KATHERYN BRENNER  Order #:    [id number]                    Reading MD: MONI JEFFERY MD    Measurements  Intervals                                Axis  Rate:       81                           P:  IN:                                      QRS:        67  QRSD:       98                           T:  QT:         407  QTc:        473    Interpretive Statements  Afib/flut and V-paced complexes  No further rhythm analysis attempted due to paced rhythm  Borderline repolarization abnormality  Borderline prolonged QT interval  Compared to ECG 2019 10:10:41  Sinus rhythm no longer present    Electronically Signed On 10- 10:18:33 PDT by MONI JEFFERY MD       CTA PE 2017  1.  No evidence of pulmonary embolus.  2.  Postsurgical change consistent with recent median sternotomy with fluid within the sternotomy defect along the inferior aspect of the sternum with separation of the sternotomy defect by approximately 15 mm raising the possibility of sternal   dehiscence. Most inferior sternotomy wire does not appear to extend through the right side of the sternum.  3.  Moderate right and trace left pleural  effusions.    Echo 2017  1. Left ventricular ejection fraction is visually estimated to be 60%.   2. There is apical inferior septal hypokinesis.   3. Known mitral valve bioprosthesis which is functioning normally with   appropriate transvalvular gradient. Mean transvalvular gradient is  5   mmHg at a heart rate of  80 BPM.  4. Right ventricular systolic pressure is estimated to be 25 mmHg.    BLE venous 4/14/19   Bilateral lower extremities:   Pulsatile venous flow was observed consistent with elevated right heart    pressure.    No evidence of superficial or deep venous thrombosis.     MPI 4/13/19   NUCLEAR IMAGING INTERPRETATION   No evidence of significant jeopardized viable myocardium or prior myocardial    infarction.   Normal left ventricular size, ejection fraction, and wall motion.   ECG INTERPRETATION   Non diagnositic stress ECG due to underlying rhythm    Medical Decision Making:  Today's Assessment / Status / Plan:     1. Essential hypertension, benign  POCT Protime    HEMOGLOBIN A1C    MICROALBUMIN CREAT RATIO URINE    Lipid Profile    Comp Metabolic Panel    CBC WITHOUT DIFFERENTIAL   2. H/O mitral valve replacement  POCT Protime   3. Deep vein thrombosis (DVT) of non-extremity vein, unspecified chronicity     4. Chronic anticoagulation     5. Chronic gout of multiple sites, unspecified cause     6. Atrial fibrillation, unspecified type (HCC)     7. Cardiac pacemaker in situ     8. BMI 38.0-38.9,adult     9. Type 2 diabetes mellitus with complication, without long-term current use of insulin (HCC)  HEMOGLOBIN A1C    MICROALBUMIN CREAT RATIO URINE    Lipid Profile    Comp Metabolic Panel    CBC WITHOUT DIFFERENTIAL     Patient Type: Primary Prevention and DM    Etiology of Established CVD if Present:   1) s/p MVR   2) Afib    Anticoagulation:  Indication for anticoagulation: afib, s/p MVR  Anti-Platelet/Anti-Coagulant Tx: yes  TTR adequate   Anti-Coagulation Plan:  - continue VKA, INR monitoring  routinely with AC clinic  - report any bleeding to AC clinic   - counseled on s/s of stroke to seek prompt attention   - continue complete avoidance of tobacco products  - avoid Aspirin and anti-inflammatories (eg. Advil, ibuprofen, Aleve, naproxen, etc) while anticoagulated   - avoid skiing or other dangerous activities to reduce risk of head injury and brain bleeds  - recommended to see your PCP to discuss if you need age-appropriate cancer screenings as a small % of blood clots may be caused by an underlying malignancy  - if any bleeding lasting 30min without stopping, please seek care with your PCP, urgent care, or ED  - reversal agents for most blood thinners are now available and used if you have major bleeding    Lipid Management: Qualifies for Statin Therapy Based on 2013 ACC/AHA Guidelines: yes  Calculated 10-Year Risk of ASCVD: N/A  Currently on Statin: Yes  Notable metabolic dyslipidemia   Pt advised that due to T2D he is highly recommended to continue mod-intensity  Statin and aware this will reduced his overall ASCVD risk.  Despite this, pt wishes to d/c atorva and prefers to be off all statins due to perception of ADRs.   NLA Risk Category:   Very high:  Evidence of ASCVD, T2D with 2 or more RFs   Tx threshold:  non-HDL-C >99, LDL-C >69  Tx goal: non-HDL-C <100,  LDL-C <70  (optional: apoB <80)  At goal:  Yes, very low LDL-C  Plan:  - reinforced ongoing TLC measures   - stop atorvastatin, declines treatment, advised about risks of elevated cholesterol and increased risk of ASCVD  - consider fenofibrate or Rx-grade omega-3 FA if trigs climb despite statin and TLC     Blood Pressure Management: Goal: ACC/AHA (2017) goal <130/80  Home BP at goal:  yes  Office BP at goal:  Too low   Echo: abnormal inferior hypokinesis, preserved EF, s/p bioprosth MVR  ACR: NORMAL  Device candidate? no  Plan:   Monitoring:   - continue home BP monitoring, reviewed correct technique:  Yes   - order 24h ABPM:  UNDECIDED,  consider if white coat effect   - monitor lytes/gfr routinely   Medications:  ACEi/ARB: stop benazepril due to low BP  DHP-CCB: none  Thiazide: none  Other:   BB:  Continue metoprolol 25mg BID due to Afib     Glycemic Status: Diabetic, T2, non-insulin   Last A1c = 6.9  Goal A1c <7.0  ACR: normal  Plan:  - continue metformin 500mg BID, plan to increase to 1000mg BID at next visit   - add SGLT2i or weekly GLP1RA as next agents  - consider DPP4i as optional therapy   - recommmend to immediately establish for routine care with PCP to include regular A1c monitoring, annual albumin/creatinine ratio (ACR), annual diabetic retinopathy screening, foot exams, annual flu vaccine, and updates to pneumonia vaccines as appropriate     Smoking: continued complete avoidance of all tobacco products     Physical Activity: continue healthy activity to improve CV fitness, see care instructions for additional details     Weight Management and Nutrition: Dietary plan was discussed with patient at this visit including ADA diet, DASH, low sodium and/or as outlined in care instructions     Other:   1) gout, stable.   - continue allopurinol  - defer mgmt to PCP     2) BPH w/ LUTS.  Mild symptoms.  PSA normal   - continue tamsulosin 0.4mg daily, update labs, needs to f/u with PCP   - no additional refills will be provided through our office     3) Afib.  Stable, rate-controlled and anticoagulated due to high CARSON-VASC.    Low risk of bleed.   - still pending with cardiology   - defer mgmt to cardiology  - ongoing INR testing     4) pacer in situ. Stable  - defer mgmt to cardiology     5) hx of MVR. Stable.  - defer mgmt to cardiology, pending initial eval     Instructed to follow-up with PCP for remainder of adult medical needs: yes  We will partner with other providers in the management of established vascular disease and cardiometabolic risk factors.    Studies to Be Obtained: none  Labs to Be Obtained:  Lipid, cmp, a1c in 3mo    Follow up  in: 3 months with liss Ingram M.D.

## 2019-12-05 NOTE — PROGRESS NOTES
Anticoagulation Summary  As of 12/5/2019    INR goal:   2.5-3.5   TTR:   53.0 % (3.2 mo)   INR used for dosing:      Warfarin maintenance plan:   7 mg (5 mg x 1 and 2 mg x 1) every Wed; 5 mg (5 mg x 1) all other days   Weekly warfarin total:   37 mg   Plan last modified:   SHARMILA Bob (10/16/2019)   Next INR check:      Target end date:   Indefinite    Indications    H/O mitral valve replacement [Z95.2]  A-fib (HCC) (Resolved) [I48.91]  Deep vein thrombosis (HCC) [I82.409] [I82.409]             Anticoagulation Episode Summary     INR check location:       Preferred lab:       Send INR reminders to:       Comments:         Anticoagulation Care Providers     Provider Role Specialty Phone number    Sumanth Yancey M.D. Referring Cardiology [phone]    [facility] Responsible  [phone]          Anticoagulation Patient Findings      HPI:  Morales Olivier seen in clinic today, on anticoagulation therapy with warfarin due to hx of mitral valve replacement and DVT.  Changes to current medical/health status since last appt: ***  Denies signs/symptoms of bleeding and/or thrombosis since the last appt.    Denies any interval changes to diet  Denies any interval changes to medications since last appt.   Denies any complications or cost restrictions with current therapy.   BP recorded in vitals. ***      A/P   INR  ***-therapeutic.   ***    Follow up appointment in {NUMBER :10} week(s).    Santiago Gaines, PharmD

## 2019-12-05 NOTE — PATIENT INSTRUCTIONS
1)stop atorvastatin and benazepril  2) continue all other medications  3) repeat labs in about 6mo    Blood pressure and BP-lowering diet:  If you are being treated for blood pressure today: please be advised that stabilizing BP may require multiple med changes and close monitoring over the next several months and initially there may be additional imaging and labs and the possibility of adverse drug reactions. Variability of your blood pressure and heart rate may occur until we have things stabilized.  Please feel free to contact our office as needed for questions or concerns.      SODIUM RESTRICTIONS: - consume no more than 2,300mg of sodium daily (look at food labels) ,or if you have high BP then reduce to no more than 1,500mg of sodium daily   For more information visit: https://www.Powered/contents/low-sodium-diet-the-basics/print?dtsomNaz=7610&source=see_link     DASH diet   (https://www.heart.org/en/health-topics/high-blood-pressure/changes-you-can-make-to-manage-high-blood-pressure/managing-blood-pressure-with-a-heart-healthy-diet)    - if you notice BP > 140/>90 for more than 3 days, then contact our office for further instructions    Cholesterol-reducing diets:   - AHA diet  (http://www.heart.org/en/healthy-living/healthy-eating/eat-smart/nutrition-basics/aha-diet-and-lifestyle-recommendations)   - Mediterranean diet (http://www.heart.org/en/healthy-living/healthy-eating/eat-smart/nutrition-basics/mediterranean-diet)   - lowering triglycerides: (http://my.americanheart.org/idc/groups/ahamah-public/@wcm/@sop/@[facility]/documents/downloadable/[facility]_425988.pdf)     Physical activity: Unless directed otherwise at today's visit or you are physically incapable due to your current health or medical conditions, it is advised per the American Heart Association to engage in moderate to vigorous physical activity such as brisk walking, swimming, cycling, >150 minutes per week at 30-40 minutes per session, 3 to 5 times  weekly.      General healthly nutrition advice:  - the USDA food pattern (https://www.cnpp.usda.gov/USDAFoodPatterns)  - plate method (https://www.choosemyplate.gov/)  - consume diet that emphasizes intake of vegetables, fruits, and whole grains,  - use low fat diary products, poultry, fish, legumes, nontropical vegetable oils, nuts  - limit intake of sweets, sugar-sweetned beverages, and red meats   - reduce saturated and trans fats to <6% of your daily calories

## 2019-12-05 NOTE — NON-PROVIDER
Would like to be taken off:  Atorvastatin- has 1 tablet left   Benazepril- has 1 tablet left   Metformin- 3 tablets left     States [facility] never provided him with the BP Monitor    Possible Flu shot reaction-has rash on injection area  Has right knee pain     Anticoagulation Summary  As of 2019    INR goal:   2.5-3.5   TTR:   61.4 % (3.9 mo)   INR used for dosin.90 (2019)   Warfarin maintenance plan:   7 mg (5 mg x 1 and 2 mg x 1) every Wed; 5 mg (5 mg x 1) all other days   Weekly warfarin total:   37 mg   Plan last modified:   WILLIAM BobPPASQUALE (10/16/2019)   Next INR check:   2020   Target end date:   Indefinite    Indications    H/O mitral valve replacement [Z95.2]  A-fib (HCC) (Resolved) [I48.91]  Deep vein thrombosis (HCC) [I82.409] [I82.409]             Anticoagulation Episode Summary     INR check location:       Preferred lab:       Send INR reminders to:       Comments:         Anticoagulation Care Providers     Provider Role Specialty Phone number    Sumanth Yancey M.D. Referring Cardiology [phone]    [facility] Responsible  [phone]        Anticoagulation Patient Findings      HPI:  Morales Olivier seen in clinic today, on anticoagulation therapy with warfarin for mitral valve replacement.   Changes to current medical/health status since last appt: none  Denies signs/symptoms of bleeding and/or thrombosis since the last appt.    Denies any interval changes to diet  Denies any interval changes to medications since last appt.   Denies any complications or cost restrictions with current therapy.   BP recorded in vitals.  Confirmed dosing regimen.     A/P   INR  therapeutic.   Pt is to continue with current warfarin dosing regimen.     Follow up appointment in 5 weeks, pt is unable to return sooner.     Adolph Arnett, PharmD

## 2019-12-15 ENCOUNTER — HOSPITAL ENCOUNTER (INPATIENT)
Facility: MEDICAL CENTER | Age: 64
LOS: 1 days | DRG: 694 | End: 2019-12-16
Attending: EMERGENCY MEDICINE | Admitting: INTERNAL MEDICINE
Payer: MEDICARE

## 2019-12-15 ENCOUNTER — APPOINTMENT (OUTPATIENT)
Dept: RADIOLOGY | Facility: MEDICAL CENTER | Age: 64
DRG: 694 | End: 2019-12-15
Attending: INTERNAL MEDICINE
Payer: MEDICARE

## 2019-12-15 DIAGNOSIS — D64.9 ANEMIA, UNSPECIFIED TYPE: ICD-10-CM

## 2019-12-15 DIAGNOSIS — D68.32 WARFARIN-INDUCED COAGULOPATHY (HCC): ICD-10-CM

## 2019-12-15 DIAGNOSIS — T45.515A WARFARIN-INDUCED COAGULOPATHY (HCC): ICD-10-CM

## 2019-12-15 DIAGNOSIS — R31.0 GROSS HEMATURIA: ICD-10-CM

## 2019-12-15 PROBLEM — M10.9 GOUT ATTACK: Status: RESOLVED | Noted: 2019-09-25 | Resolved: 2019-12-15

## 2019-12-15 LAB
ALBUMIN SERPL BCP-MCNC: 4.2 G/DL (ref 3.2–4.9)
ALBUMIN/GLOB SERPL: 1.9 G/DL
ALP SERPL-CCNC: 77 U/L (ref 30–99)
ALT SERPL-CCNC: 19 U/L (ref 2–50)
ANION GAP SERPL CALC-SCNC: 8 MMOL/L (ref 0–11.9)
APPEARANCE UR: ABNORMAL
APTT PPP: 41.1 SEC (ref 24.7–36)
AST SERPL-CCNC: 14 U/L (ref 12–45)
BACTERIA #/AREA URNS HPF: NEGATIVE /HPF
BASOPHILS # BLD AUTO: 0.6 % (ref 0–1.8)
BASOPHILS # BLD: 0.05 K/UL (ref 0–0.12)
BILIRUB SERPL-MCNC: 0.8 MG/DL (ref 0.1–1.5)
BILIRUB UR QL STRIP.AUTO: ABNORMAL
BUN SERPL-MCNC: 17 MG/DL (ref 8–22)
CALCIUM SERPL-MCNC: 9.1 MG/DL (ref 8.5–10.5)
CHLORIDE SERPL-SCNC: 107 MMOL/L (ref 96–112)
CO2 SERPL-SCNC: 23 MMOL/L (ref 20–33)
COLOR UR: ABNORMAL
CREAT SERPL-MCNC: 1.29 MG/DL (ref 0.5–1.4)
EOSINOPHIL # BLD AUTO: 0.29 K/UL (ref 0–0.51)
EOSINOPHIL NFR BLD: 3.5 % (ref 0–6.9)
EPI CELLS #/AREA URNS HPF: ABNORMAL /HPF
ERYTHROCYTE [DISTWIDTH] IN BLOOD BY AUTOMATED COUNT: 52.3 FL (ref 35.9–50)
GLOBULIN SER CALC-MCNC: 2.2 G/DL (ref 1.9–3.5)
GLUCOSE BLD-MCNC: 138 MG/DL (ref 65–99)
GLUCOSE SERPL-MCNC: 157 MG/DL (ref 65–99)
GLUCOSE UR STRIP.AUTO-MCNC: NEGATIVE MG/DL
HCT VFR BLD AUTO: 40.7 % (ref 42–52)
HGB BLD-MCNC: 13.2 G/DL (ref 14–18)
HYALINE CASTS #/AREA URNS LPF: ABNORMAL /LPF
IMM GRANULOCYTES # BLD AUTO: 0.08 K/UL (ref 0–0.11)
IMM GRANULOCYTES NFR BLD AUTO: 1 % (ref 0–0.9)
INR PPP: 2.79 (ref 0.87–1.13)
KETONES UR STRIP.AUTO-MCNC: NEGATIVE MG/DL
LEUKOCYTE ESTERASE UR QL STRIP.AUTO: NEGATIVE
LYMPHOCYTES # BLD AUTO: 0.83 K/UL (ref 1–4.8)
LYMPHOCYTES NFR BLD: 10 % (ref 22–41)
MCH RBC QN AUTO: 32 PG (ref 27–33)
MCHC RBC AUTO-ENTMCNC: 32.4 G/DL (ref 33.7–35.3)
MCV RBC AUTO: 98.8 FL (ref 81.4–97.8)
MICRO URNS: ABNORMAL
MONOCYTES # BLD AUTO: 0.69 K/UL (ref 0–0.85)
MONOCYTES NFR BLD AUTO: 8.4 % (ref 0–13.4)
NEUTROPHILS # BLD AUTO: 6.32 K/UL (ref 1.82–7.42)
NEUTROPHILS NFR BLD: 76.5 % (ref 44–72)
NITRITE UR QL STRIP.AUTO: NEGATIVE
NRBC # BLD AUTO: 0 K/UL
NRBC BLD-RTO: 0 /100 WBC
PH UR STRIP.AUTO: 6 [PH] (ref 5–8)
PLATELET # BLD AUTO: 129 K/UL (ref 164–446)
PMV BLD AUTO: 9.9 FL (ref 9–12.9)
POTASSIUM SERPL-SCNC: 4.5 MMOL/L (ref 3.6–5.5)
PROT SERPL-MCNC: 6.4 G/DL (ref 6–8.2)
PROT UR QL STRIP: 100 MG/DL
PROTHROMBIN TIME: 30.5 SEC (ref 12–14.6)
RBC # BLD AUTO: 4.12 M/UL (ref 4.7–6.1)
RBC # URNS HPF: >150 /HPF
RBC UR QL AUTO: ABNORMAL
SODIUM SERPL-SCNC: 138 MMOL/L (ref 135–145)
SP GR UR REFRACTOMETRY: >=1.03
SP GR UR STRIP.AUTO: >=1.03
UROBILINOGEN UR STRIP.AUTO-MCNC: 0.2 MG/DL
WBC # BLD AUTO: 8.3 K/UL (ref 4.8–10.8)
WBC #/AREA URNS HPF: ABNORMAL /HPF

## 2019-12-15 PROCEDURE — 700117 HCHG RX CONTRAST REV CODE 255: Performed by: INTERNAL MEDICINE

## 2019-12-15 PROCEDURE — 85025 COMPLETE CBC W/AUTO DIFF WBC: CPT

## 2019-12-15 PROCEDURE — 700101 HCHG RX REV CODE 250: Performed by: INTERNAL MEDICINE

## 2019-12-15 PROCEDURE — 80053 COMPREHEN METABOLIC PANEL: CPT

## 2019-12-15 PROCEDURE — 700105 HCHG RX REV CODE 258: Performed by: INTERNAL MEDICINE

## 2019-12-15 PROCEDURE — 85610 PROTHROMBIN TIME: CPT

## 2019-12-15 PROCEDURE — 700111 HCHG RX REV CODE 636 W/ 250 OVERRIDE (IP): Performed by: INTERNAL MEDICINE

## 2019-12-15 PROCEDURE — 99223 1ST HOSP IP/OBS HIGH 75: CPT | Performed by: INTERNAL MEDICINE

## 2019-12-15 PROCEDURE — 700102 HCHG RX REV CODE 250 W/ 637 OVERRIDE(OP): Performed by: HOSPITALIST

## 2019-12-15 PROCEDURE — 51798 US URINE CAPACITY MEASURE: CPT

## 2019-12-15 PROCEDURE — A9270 NON-COVERED ITEM OR SERVICE: HCPCS | Performed by: INTERNAL MEDICINE

## 2019-12-15 PROCEDURE — 700101 HCHG RX REV CODE 250: Performed by: HOSPITALIST

## 2019-12-15 PROCEDURE — 700102 HCHG RX REV CODE 250 W/ 637 OVERRIDE(OP): Performed by: EMERGENCY MEDICINE

## 2019-12-15 PROCEDURE — 85730 THROMBOPLASTIN TIME PARTIAL: CPT

## 2019-12-15 PROCEDURE — 770020 HCHG ROOM/CARE - TELE (206)

## 2019-12-15 PROCEDURE — 81001 URINALYSIS AUTO W/SCOPE: CPT

## 2019-12-15 PROCEDURE — A9270 NON-COVERED ITEM OR SERVICE: HCPCS | Performed by: HOSPITALIST

## 2019-12-15 PROCEDURE — 82962 GLUCOSE BLOOD TEST: CPT

## 2019-12-15 PROCEDURE — 99285 EMERGENCY DEPT VISIT HI MDM: CPT

## 2019-12-15 PROCEDURE — 74178 CT ABD&PLV WO CNTR FLWD CNTR: CPT

## 2019-12-15 PROCEDURE — 700102 HCHG RX REV CODE 250 W/ 637 OVERRIDE(OP): Performed by: INTERNAL MEDICINE

## 2019-12-15 PROCEDURE — A9270 NON-COVERED ITEM OR SERVICE: HCPCS | Performed by: EMERGENCY MEDICINE

## 2019-12-15 PROCEDURE — 36415 COLL VENOUS BLD VENIPUNCTURE: CPT

## 2019-12-15 RX ORDER — ALLOPURINOL 100 MG/1
100 TABLET ORAL DAILY
Status: DISCONTINUED | OUTPATIENT
Start: 2019-12-16 | End: 2019-12-16 | Stop reason: HOSPADM

## 2019-12-15 RX ORDER — ACETAMINOPHEN 500 MG
500-1000 TABLET ORAL EVERY 6 HOURS PRN
Status: DISCONTINUED | OUTPATIENT
Start: 2019-12-15 | End: 2019-12-15

## 2019-12-15 RX ORDER — ONDANSETRON 2 MG/ML
4 INJECTION INTRAMUSCULAR; INTRAVENOUS EVERY 4 HOURS PRN
Status: DISCONTINUED | OUTPATIENT
Start: 2019-12-15 | End: 2019-12-16 | Stop reason: HOSPADM

## 2019-12-15 RX ORDER — METOPROLOL TARTRATE 1 MG/ML
5 INJECTION, SOLUTION INTRAVENOUS ONCE
Status: COMPLETED | OUTPATIENT
Start: 2019-12-15 | End: 2019-12-15

## 2019-12-15 RX ORDER — SODIUM CHLORIDE, SODIUM LACTATE, POTASSIUM CHLORIDE, CALCIUM CHLORIDE 600; 310; 30; 20 MG/100ML; MG/100ML; MG/100ML; MG/100ML
INJECTION, SOLUTION INTRAVENOUS CONTINUOUS
Status: DISPENSED | OUTPATIENT
Start: 2019-12-15 | End: 2019-12-16

## 2019-12-15 RX ORDER — FINASTERIDE 5 MG/1
5 TABLET, FILM COATED ORAL DAILY
Status: DISCONTINUED | OUTPATIENT
Start: 2019-12-15 | End: 2019-12-16 | Stop reason: HOSPADM

## 2019-12-15 RX ORDER — HYDROCODONE BITARTRATE AND ACETAMINOPHEN 5; 325 MG/1; MG/1
1-2 TABLET ORAL EVERY 6 HOURS PRN
Status: DISCONTINUED | OUTPATIENT
Start: 2019-12-15 | End: 2019-12-16 | Stop reason: HOSPADM

## 2019-12-15 RX ORDER — POLYETHYLENE GLYCOL 3350 17 G/17G
1 POWDER, FOR SOLUTION ORAL
Status: DISCONTINUED | OUTPATIENT
Start: 2019-12-15 | End: 2019-12-16 | Stop reason: HOSPADM

## 2019-12-15 RX ORDER — TAMSULOSIN HYDROCHLORIDE 0.4 MG/1
0.8 CAPSULE ORAL
Status: DISCONTINUED | OUTPATIENT
Start: 2019-12-16 | End: 2019-12-16 | Stop reason: HOSPADM

## 2019-12-15 RX ORDER — BISACODYL 10 MG
10 SUPPOSITORY, RECTAL RECTAL
Status: DISCONTINUED | OUTPATIENT
Start: 2019-12-15 | End: 2019-12-16 | Stop reason: HOSPADM

## 2019-12-15 RX ORDER — HYDROCODONE BITARTRATE AND ACETAMINOPHEN 10; 325 MG/1; MG/1
1 TABLET ORAL ONCE
Status: COMPLETED | OUTPATIENT
Start: 2019-12-15 | End: 2019-12-15

## 2019-12-15 RX ORDER — ONDANSETRON 4 MG/1
4 TABLET, ORALLY DISINTEGRATING ORAL EVERY 4 HOURS PRN
Status: DISCONTINUED | OUTPATIENT
Start: 2019-12-15 | End: 2019-12-16 | Stop reason: HOSPADM

## 2019-12-15 RX ORDER — WARFARIN SODIUM 5 MG/1
5 TABLET ORAL
Status: DISCONTINUED | OUTPATIENT
Start: 2019-12-15 | End: 2019-12-16 | Stop reason: HOSPADM

## 2019-12-15 RX ORDER — ENALAPRILAT 1.25 MG/ML
1.25 INJECTION INTRAVENOUS EVERY 6 HOURS PRN
Status: DISCONTINUED | OUTPATIENT
Start: 2019-12-15 | End: 2019-12-16 | Stop reason: HOSPADM

## 2019-12-15 RX ORDER — OXYCODONE HYDROCHLORIDE 5 MG/1
5 TABLET ORAL ONCE
Status: COMPLETED | OUTPATIENT
Start: 2019-12-15 | End: 2019-12-15

## 2019-12-15 RX ORDER — ALBUTEROL SULFATE 90 UG/1
2 AEROSOL, METERED RESPIRATORY (INHALATION) EVERY 6 HOURS PRN
Status: DISCONTINUED | OUTPATIENT
Start: 2019-12-15 | End: 2019-12-16 | Stop reason: HOSPADM

## 2019-12-15 RX ORDER — AMOXICILLIN 250 MG
2 CAPSULE ORAL 2 TIMES DAILY
Status: DISCONTINUED | OUTPATIENT
Start: 2019-12-15 | End: 2019-12-16 | Stop reason: HOSPADM

## 2019-12-15 RX ORDER — TAMSULOSIN HYDROCHLORIDE 0.4 MG/1
0.4 CAPSULE ORAL
Status: DISCONTINUED | OUTPATIENT
Start: 2019-12-16 | End: 2019-12-15

## 2019-12-15 RX ADMIN — METOPROLOL TARTRATE 25 MG: 25 TABLET, FILM COATED ORAL at 18:09

## 2019-12-15 RX ADMIN — METOPROLOL TARTRATE 25 MG: 25 TABLET, FILM COATED ORAL at 13:28

## 2019-12-15 RX ADMIN — WARFARIN SODIUM 5 MG: 5 TABLET ORAL at 18:09

## 2019-12-15 RX ADMIN — METOPROLOL TARTRATE 5 MG: 5 INJECTION, SOLUTION INTRAVENOUS at 14:26

## 2019-12-15 RX ADMIN — IOHEXOL 100 ML: 350 INJECTION, SOLUTION INTRAVENOUS at 17:56

## 2019-12-15 RX ADMIN — SENNOSIDES AND DOCUSATE SODIUM 2 TABLET: 8.6; 5 TABLET ORAL at 18:10

## 2019-12-15 RX ADMIN — SODIUM CHLORIDE, POTASSIUM CHLORIDE, SODIUM LACTATE AND CALCIUM CHLORIDE: 600; 310; 30; 20 INJECTION, SOLUTION INTRAVENOUS at 14:33

## 2019-12-15 RX ADMIN — ACETAMINOPHEN 1000 MG: 500 TABLET ORAL at 14:25

## 2019-12-15 RX ADMIN — HYDROCODONE BITARTRATE AND ACETAMINOPHEN 1 TABLET: 10; 325 TABLET ORAL at 22:32

## 2019-12-15 RX ADMIN — ONDANSETRON 4 MG: 4 TABLET, ORALLY DISINTEGRATING ORAL at 18:10

## 2019-12-15 RX ADMIN — METOPROLOL TARTRATE 5 MG: 5 INJECTION, SOLUTION INTRAVENOUS at 22:32

## 2019-12-15 RX ADMIN — HYDROCODONE BITARTRATE AND ACETAMINOPHEN 2 TABLET: 5; 325 TABLET ORAL at 18:09

## 2019-12-15 RX ADMIN — OXYCODONE HYDROCHLORIDE 5 MG: 5 TABLET ORAL at 09:23

## 2019-12-15 RX ADMIN — FINASTERIDE 5 MG: 5 TABLET, FILM COATED ORAL at 13:28

## 2019-12-15 ASSESSMENT — ENCOUNTER SYMPTOMS
DEPRESSION: 0
MYALGIAS: 0
SPUTUM PRODUCTION: 0
WHEEZING: 0
HEARTBURN: 0
ORTHOPNEA: 0
CLAUDICATION: 0
BLURRED VISION: 0
FEVER: 0
FLANK PAIN: 0
BACK PAIN: 0
DIAPHORESIS: 0
BLOOD IN STOOL: 0
DIARRHEA: 0
DOUBLE VISION: 0
SHORTNESS OF BREATH: 0
PALPITATIONS: 0
VOMITING: 0
TINGLING: 0
CONSTIPATION: 0
COUGH: 0
PND: 0
ABDOMINAL PAIN: 0
CHILLS: 0
TREMORS: 0
NAUSEA: 0
NECK PAIN: 0
DIZZINESS: 0
HEADACHES: 0
HEMOPTYSIS: 0
FALLS: 0

## 2019-12-15 ASSESSMENT — COGNITIVE AND FUNCTIONAL STATUS - GENERAL
MOVING FROM LYING ON BACK TO SITTING ON SIDE OF FLAT BED: A LITTLE
SUGGESTED CMS G CODE MODIFIER DAILY ACTIVITY: CI
CLIMB 3 TO 5 STEPS WITH RAILING: A LITTLE
MOBILITY SCORE: 20
SUGGESTED CMS G CODE MODIFIER MOBILITY: CJ
DAILY ACTIVITIY SCORE: 23
STANDING UP FROM CHAIR USING ARMS: A LITTLE
DRESSING REGULAR LOWER BODY CLOTHING: A LITTLE
WALKING IN HOSPITAL ROOM: A LITTLE

## 2019-12-15 ASSESSMENT — LIFESTYLE VARIABLES
TOTAL SCORE: 0
TOTAL SCORE: 0
CONSUMPTION TOTAL: INCOMPLETE
HAVE YOU EVER FELT YOU SHOULD CUT DOWN ON YOUR DRINKING: NO
EVER_SMOKED: YES
HAVE PEOPLE ANNOYED YOU BY CRITICIZING YOUR DRINKING: NO
EVER FELT BAD OR GUILTY ABOUT YOUR DRINKING: NO
SUBSTANCE_ABUSE: 0
EVER HAD A DRINK FIRST THING IN THE MORNING TO STEADY YOUR NERVES TO GET RID OF A HANGOVER: NO
EVER_SMOKED: YES
DO YOU DRINK ALCOHOL: NO
TOTAL SCORE: 0

## 2019-12-15 ASSESSMENT — COPD QUESTIONNAIRES
HAVE YOU SMOKED AT LEAST 100 CIGARETTES IN YOUR ENTIRE LIFE: YES
DURING THE PAST 4 WEEKS HOW MUCH DID YOU FEEL SHORT OF BREATH: SOME OF THE TIME
DO YOU EVER COUGH UP ANY MUCUS OR PHLEGM?: NO/ONLY WITH OCCASIONAL COLDS OR INFECTIONS
COPD SCREENING SCORE: 5

## 2019-12-15 NOTE — ED PROVIDER NOTES
ED Provider Note    CHIEF COMPLAINT  Chief Complaint   Patient presents with   • Urinating Blood       HPI  Morales Olivier is a 64 y.o. male who presents with gross hematuria onset this morning.  It is painless, no flank discomfort, no dysuria.  He denies fever.  No vomiting.  He states he feels lightheaded this morning, not yesterday however.  No chest pain, no shortness of breath.  He takes Coumadin for atrial fibrillation, states he has been taking his medication as prescribed.  He denies history of prostate problems.  There is a history of kidney stones although states this feels much different.  Patient has chronic right knee pain from gout.  He is requesting pain medication for this.    REVIEW OF SYSTEMS  Constitutional: No fever  Respiratory: No shortness of breath  Cardiac: Atrial fibrillation.  No chest pain or syncope  Gastrointestinal: No abdominal pain or vomiting  Musculoskeletal: No flank pain.  Chronic right knee pain  Neurologic: No headache  Genitourinary: Hematuria       All other systems are negative.     PAST MEDICAL HISTORY  Past Medical History:   Diagnosis Date   • Atrial fibrillation (HCC)    • Bronchitis    • CAD (coronary artery disease)    • Gout    • Heart valve disease     mitral valve replacement (bovine)   • Hypertension    • Pacemaker    • Personal history of venous thrombosis and embolism 2009    DVT right leg   • PVC (premature ventricular contraction) 4/13/2019   • Renal disorder     kidney stones   • Type II or unspecified type diabetes mellitus without mention of complication, not stated as uncontrolled     DM type II- diet controlled       FAMILY HISTORY  Family History   Problem Relation Age of Onset   • Heart Disease Neg Hx        SOCIAL HISTORY  Social History     Socioeconomic History   • Marital status: Single     Spouse name: Not on file   • Number of children: Not on file   • Years of education: Not on file   • Highest education level: Not on file   Occupational  History   • Not on file   Social Needs   • Financial resource strain: Not on file   • Food insecurity:     Worry: Not on file     Inability: Not on file   • Transportation needs:     Medical: Not on file     Non-medical: Not on file   Tobacco Use   • Smoking status: Former Smoker     Last attempt to quit: 1981     Years since quittin.9   • Smokeless tobacco: Never Used   Substance and Sexual Activity   • Alcohol use: No   • Drug use: No   • Sexual activity: Not on file   Lifestyle   • Physical activity:     Days per week: Not on file     Minutes per session: Not on file   • Stress: Not on file   Relationships   • Social connections:     Talks on phone: Not on file     Gets together: Not on file     Attends [employer] service: Not on file     Active member of club or organization: Not on file     Attends meetings of clubs or organizations: Not on file     Relationship status: Not on file   • Intimate partner violence:     Fear of current or ex partner: Not on file     Emotionally abused: Not on file     Physically abused: Not on file     Forced sexual activity: Not on file   Other Topics Concern   • Not on file   Social History Narrative   • Not on file       SURGICAL HISTORY  Past Surgical History:   Procedure Laterality Date   • MITRAL VALVE REPLACE  3/8/2017    Procedure: MITRAL VALVE REPLACE-REDO;  Surgeon: Cornelia Wright M.D.;  Location: [facility];  Service:    • KD  3/8/2017    Procedure: KD;  Surgeon: Cornelia Wright M.D.;  Location: [facility];  Service:    • IRRIGATION & DEBRIDEMENT GENERAL  2009    Performed by MICHEL URBINA at [facility]   • WIDE EXCISION  2009    Performed by MICHEL URBINA at [facility]   • ANGIOGRAM  2009    Performed by MICHEL URBINA at [facility]   • CATH REMOVAL  2009    Performed by MICHEL URBINA at [facility]   • THROMBECTOMY  2009    Performed by  "MICHEL URBINA at SUR[facility]ERY [facility]   • EMBOLECTOMY  7/4/2009    Performed by MICHEL URBINA at SUR[facility]ERY [facility]   • ANGIOGRAM  7/3/2009    Performed by MORGAN WARD at SURGERY [facility]   • MITRAL VALVE REPLACE  3/14/08    Performed by JEANA MARTELL at SURGERY [facility]   • MAZE PROCEDURE  3/14/08    Performed by JEANA MARTELL at SURGERY [facility]   • OTHER CARDIAC SURGERY  2008    mitral valve replacement   • OTHER ABDOMINAL SURGERY      imbulica repair        CURRENT MEDICATIONS  Home Medications     Reviewed by Audra Robins R.N. (Registered Nurse) on 12/15/19 at 0914  Med List Status: Partial   Medication Last Dose Status   acetaminophen (TYLENOL) 500 MG Tab  Active   albuterol 108 (90 Base) MCG/ACT Aero Soln inhalation aerosol  Active   allopurinol (ZYLOPRIM) 100 MG Tab  Active   Blood Pressure Monitoring (BLOOD PRESSURE KIT) Device  Active   calcium carbonate (TUMS) 500 MG Chew Tab  Active   metFORMIN (GLUCOPHAGE) 500 MG Tab  Active   metoprolol (LOPRESSOR) 25 MG Tab  Active   tamsulosin (FLOMAX) 0.4 MG capsule  Active   warfarin (COUMADIN) 5 MG Tab  Active                ALLERGIES  No Known Allergies    PHYSICAL EXAM  VITAL SIGNS: /85   Pulse (!) 105   Temp 36.6 °C (97.8 °F) (Temporal)   Resp 16   Ht 1.778 m (5' 10\")   Wt 119.8 kg (264 lb 1.8 oz)   SpO2 96%   BMI 37.90 kg/m²   Constitutional: Well-nourished, slight pallor  ENT: Nares clear, mucous membranes moist.  Eyes:  Conjunctiva normal, No discharge.    Lymphatic: No adenopathy.   Cardiovascular: Tachycardic heart rate, irregular rhythm.   Pulmonary: No wheezing, no rales  Gastrointestinal: Soft and nontender, no guarding  Skin: Warm, Dry, No jaundice.  Pallor.  Musculoskeletal:  No CVA tenderness.   Neurologic:  Normal motor and sensory function, No focal deficits noted.   Psychiatric:Normal affect, Normal mood.    RADIOLOGY/PROCEDURES/Labs  Results for orders placed or performed during " the hospital encounter of 12/15/19   URINALYSIS CULTURE, IF INDICATED   Result Value Ref Range    Color Red     Character Cloudy (A)     Specific Gravity >=1.030 <1.035    Ph 6.0 5.0 - 8.0    Glucose Negative Negative mg/dL    Ketones Negative Negative mg/dL    Protein 100 (A) Negative mg/dL    Bilirubin Small (A) Negative    Urobilinogen, Urine 0.2 Negative    Nitrite Negative Negative    Leukocyte Esterase Negative Negative    Occult Blood Large (A) Negative    Micro Urine Req Microscopic    CBC WITH DIFFERENTIAL   Result Value Ref Range    WBC 8.3 4.8 - 10.8 K/uL    RBC 4.12 (L) 4.70 - 6.10 M/uL    Hemoglobin 13.2 (L) 14.0 - 18.0 g/dL    Hematocrit 40.7 (L) 42.0 - 52.0 %    MCV 98.8 (H) 81.4 - 97.8 fL    MCH 32.0 27.0 - 33.0 pg    MCHC 32.4 (L) 33.7 - 35.3 g/dL    RDW 52.3 (H) 35.9 - 50.0 fL    Platelet Count 129 (L) 164 - 446 K/uL    MPV 9.9 9.0 - 12.9 fL    Neutrophils-Polys 76.50 (H) 44.00 - 72.00 %    Lymphocytes 10.00 (L) 22.00 - 41.00 %    Monocytes 8.40 0.00 - 13.40 %    Eosinophils 3.50 0.00 - 6.90 %    Basophils 0.60 0.00 - 1.80 %    Immature Granulocytes 1.00 (H) 0.00 - 0.90 %    Nucleated RBC 0.00 /100 WBC    Neutrophils (Absolute) 6.32 1.82 - 7.42 K/uL    Lymphs (Absolute) 0.83 (L) 1.00 - 4.80 K/uL    Monos (Absolute) 0.69 0.00 - 0.85 K/uL    Eos (Absolute) 0.29 0.00 - 0.51 K/uL    Baso (Absolute) 0.05 0.00 - 0.12 K/uL    Immature Granulocytes (abs) 0.08 0.00 - 0.11 K/uL    NRBC (Absolute) 0.00 K/uL   COMP METABOLIC PANEL   Result Value Ref Range    Sodium 138 135 - 145 mmol/L    Potassium 4.5 3.6 - 5.5 mmol/L    Chloride 107 96 - 112 mmol/L    Co2 23 20 - 33 mmol/L    Anion Gap 8.0 0.0 - 11.9    Glucose 157 (H) 65 - 99 mg/dL    Bun 17 8 - 22 mg/dL    Creatinine 1.29 0.50 - 1.40 mg/dL    Calcium 9.1 8.5 - 10.5 mg/dL    AST(SGOT) 14 12 - 45 U/L    ALT(SGPT) 19 2 - 50 U/L    Alkaline Phosphatase 77 30 - 99 U/L    Total Bilirubin 0.8 0.1 - 1.5 mg/dL    Albumin 4.2 3.2 - 4.9 g/dL    Total Protein 6.4 6.0  - 8.2 g/dL    Globulin 2.2 1.9 - 3.5 g/dL    A-G Ratio 1.9 g/dL   PROTHROMBIN TIME (INR)   Result Value Ref Range    PT 30.5 (H) 12.0 - 14.6 sec    INR 2.79 (H) 0.87 - 1.13   APTT   Result Value Ref Range    APTT 41.1 (H) 24.7 - 36.0 sec   ESTIMATED GFR   Result Value Ref Range    GFR If African American >60 >60 mL/min/1.73 m 2    GFR If Non  56 (A) >60 mL/min/1.73 m 2   REFRACTOMETER SG   Result Value Ref Range    Specific Gravity >=1.030    URINE MICROSCOPIC (W/UA)   Result Value Ref Range    WBC 0-2 (A) /hpf    RBC >150 (A) /hpf    Bacteria Negative None /hpf    Epithelial Cells Rare /hpf    Hyaline Cast 0-2 /lpf         COURSE & MEDICAL DECISION MAKING  Pertinent Labs & Imaging studies reviewed. (See chart for details)  Patient given dose of oxycodone for right knee pain.  Etiology of hematuria is unknown.  He has no pain, kidney stone unlikely.  There is no evidence of infection.  INR is elevated at 2.79, therapeutic range.  Patient's hemoglobin is down from 14 down to 13.  With mild tachycardic and feeling lightheaded, patient will be hospitalized for ongoing evaluation and treatment.    FINAL IMPRESSION  1. Warfarin-induced coagulopathy (HCC)     2. Gross hematuria     3. Anemia, unspecified type             Electronically signed by: Christopher Spence, 12/15/2019 10:56 AM

## 2019-12-15 NOTE — ASSESSMENT & PLAN NOTE
In the setting of anticoagulation with Coumadin  Underlying prostatic symptoms, benign prostatic hypertrophy  At this time a three-way Fragoso catheter will be placed  Patient will be initiated on CBI  We will increase Flomax to 0.8 mg and add finasteride  We will obtain CT abdomen and pelvis, urogram protocol  Closely titrate Coumadin within therapeutic window's  Monitor hemoglobin, INR levels    If failure to resolve over the next 24 to 48 hours, urology consultation for cystoscopic evaluation, may need to hold Coumadin in that setting, for now continuing Coumadin to see if would resolve with CBI

## 2019-12-15 NOTE — ASSESSMENT & PLAN NOTE
Persistent symptoms despite Flomax 0.4 mg  Increase Flomax to 0.8 mg  Initiate finasteride  Outpatient urology follow-up

## 2019-12-15 NOTE — ASSESSMENT & PLAN NOTE
Patient is on Coumadin for underlying history of DVT, mitral valve replacement, A. Fib  Patient is now presenting with gross hematuria  We will be titrating Coumadin within the therapeutic windows  We will closely monitor hemoglobin, bleeding and INR levels  If persistent bleeding, Coumadin may need to be held

## 2019-12-15 NOTE — ASSESSMENT & PLAN NOTE
With rapid ventricular rate, likely valvular A. fib  Pacemaker interrogation requested, pending at this time  Metoprolol transitioned to 25 mg every 6 hourly with holding parameters, at home on 25 mg twice daily  X1 dose of IV metoprolol 5 mg  Continue Coumadin, monitor INR  Monitor on telemetry  If persistent, failure to control, obtain echocardiogram and consult cardiology

## 2019-12-15 NOTE — H&P
[facility] Medicine History & Physical Note    Date of Service  12/15/2019    Primary Care Physician  Pcp Pt States None    Consultants  None    Code Status  FULL CODE     Chief Complaint  Gross hematuria     History of Presenting Illness  64 y.o. male who presented 12/15/2019 with Gross hematuria.     Patient with underlying history of atrial fibrillation valvular, history of mitral valve replacement, obesity, history of DVT, maintained on Coumadin with therapeutic INR of 2.5-3.5, followed by Coumadin clinic, gout, hypertension, diabetes mellitus, history of pacemaker placement who presented to the emergency department today with complaints of hematuria.  Patient reports that he woke up this morning and noticed across/severe hematuria.  Overall this is the first time he is having hematuria per patient report.  Patient reported he does have an underlying history of benign prostatic hypertrophy, he is on Flomax.  Patient reports that despite being on Flomax chronically he has hesitancy, poor urinary stream, symptoms of frequency and urgency and incomplete bladder emptying usually.  That is how he feels about his symptoms.  Reports that out of nowhere he started having hematuria, grossly bloody with blood clots which got him really concerned and made him present to the emergency department.  He does have a history of kidney stone but denies having any pain.  His hematuria is completely painless, reports after still he did pass the blood he felt some pain in his bladder area.  Upon evaluation he is comfortable, does not have any pain.  Denies having any fever or chills.  Denies having any dysuria, no dysuria over the last week.  No other issues or complaints at this time.    Review of Systems  Review of Systems   Constitutional: Negative for chills, diaphoresis, fever and malaise/fatigue.   HENT: Negative for hearing loss and tinnitus.    Eyes: Negative for blurred vision and double vision.   Respiratory: Negative for  cough, hemoptysis, sputum production, shortness of breath and wheezing.    Cardiovascular: Negative for chest pain, palpitations, orthopnea, claudication, leg swelling and PND.   Gastrointestinal: Negative for abdominal pain, blood in stool, constipation, diarrhea, heartburn, melena, nausea and vomiting.   Genitourinary: Positive for dysuria, frequency, hematuria and urgency. Negative for flank pain.   Musculoskeletal: Negative for back pain, falls, joint pain, myalgias and neck pain.   Skin: Negative for itching and rash.   Neurological: Negative for dizziness, tingling, tremors and headaches.   Psychiatric/Behavioral: Negative for depression, substance abuse and suicidal ideas.       Past Medical History   has a past medical history of Atrial fibrillation (HCC), Bronchitis, CAD (coronary artery disease), Gout, Heart valve disease, Hypertension, Pacemaker, Personal history of venous thrombosis and embolism (2009), PVC (premature ventricular contraction) (4/13/2019), Renal disorder, and Type II or unspecified type diabetes mellitus without mention of complication, not stated as uncontrolled.    Surgical History   has a past surgical history that includes mitral valve replace (3/14/08); maze procedure (3/14/08); angiogram (7/3/2009); angiogram (7/4/2009); cath removal (7/4/2009); thrombectomy (7/4/2009); embolectomy (7/4/2009); irrigation & debridement general (7/20/2009); wide excision (7/20/2009); other cardiac surgery (2008); other abdominal surgery; mitral valve replace (3/8/2017); and lopez (3/8/2017).     Family History  Negative, no family history of prostate issues / hematuria to patient;s knowledge     Social History   reports that he quit smoking about 38 years ago. He has never used smokeless tobacco. He reports that he does not drink alcohol or use drugs.    Allergies  No Known Allergies    Medications  Prior to Admission Medications   Prescriptions Last Dose Informant Patient Reported? Taking?    acetaminophen (TYLENOL) 500 MG Tab unknown at Unknown time Patient Yes No   Sig: Take 500-1,000 mg by mouth every 6 hours as needed for Moderate Pain.   albuterol 108 (90 Base) MCG/ACT Aero Soln inhalation aerosol unknown at Unknown time  No No   Sig: Inhale 2 Puffs by mouth every 6 hours as needed for Shortness of Breath.   allopurinol (ZYLOPRIM) 100 MG Tab 12/15/2019 at 0800  No No   Sig: Take 1 Tab by mouth every day for 180 days.   metFORMIN (GLUCOPHAGE) 500 MG Tab 12/15/2019 at 0800  No No   Sig: Take 1 Tab by mouth 2 times a day, with meals for 180 days.   metoprolol (LOPRESSOR) 25 MG Tab 12/15/2019 at 0800  No No   Sig: Take 1 Tab by mouth 2 times a day for 180 days.   tamsulosin (FLOMAX) 0.4 MG capsule 12/15/2019 at 0800  No No   Sig: Take 1 Cap by mouth ONE-HALF HOUR AFTER BREAKFAST for 180 days.   warfarin (COUMADIN) 5 MG Tab 12/14/2019 at 1730  Yes No   Sig: Take 5-7 mg by mouth every evening. 7 mg on Wednesday and 5 mg all other days       Facility-Administered Medications: None       Physical Exam  Temp:  [36.6 °C (97.8 °F)] 36.6 °C (97.8 °F)  Pulse:  [105-126] 108  Resp:  [16-19] 18  BP: (107-126)/(78-96) 118/91  SpO2:  [92 %-97 %] 95 %    Physical Exam  HENT:      Head: Normocephalic.      Right Ear: External ear normal.      Left Ear: External ear normal.      Nose: Nose normal.      Mouth/Throat:      Mouth: Mucous membranes are moist.   Eyes:      General: No scleral icterus.     Conjunctiva/sclera: Conjunctivae normal.      Pupils: Pupils are equal, round, and reactive to light.   Neck:      Musculoskeletal: Normal range of motion and neck supple.   Cardiovascular:      Rate and Rhythm: Normal rate and regular rhythm.      Pulses: Normal pulses.      Heart sounds: Murmur present. No friction rub. No gallop.    Pulmonary:      Effort: Pulmonary effort is normal. No respiratory distress.      Breath sounds: Normal breath sounds. No stridor. No wheezing or rhonchi.   Abdominal:      General:  Abdomen is flat. Bowel sounds are normal. There is no distension.      Palpations: There is no mass.      Tenderness: There is no tenderness. There is no right CVA tenderness, left CVA tenderness, guarding or rebound.      Hernia: No hernia is present.   Musculoskeletal: Normal range of motion.   Skin:     General: Skin is warm and dry.      Capillary Refill: Capillary refill takes less than 2 seconds.      Coloration: Skin is not jaundiced.      Findings: No erythema.   Neurological:      General: No focal deficit present.      Mental Status: He is alert and oriented to person, place, and time. Mental status is at baseline.   Psychiatric:         Mood and Affect: Mood normal.         Behavior: Behavior normal.         Thought Content: Thought content normal.         Judgment: Judgment normal.         Laboratory:  Recent Labs     12/15/19  0913   WBC 8.3   RBC 4.12*   HEMOGLOBIN 13.2*   HEMATOCRIT 40.7*   MCV 98.8*   MCH 32.0   MCHC 32.4*   RDW 52.3*   PLATELETCT 129*   MPV 9.9     Recent Labs     12/15/19  0913   SODIUM 138   POTASSIUM 4.5   CHLORIDE 107   CO2 23   GLUCOSE 157*   BUN 17   CREATININE 1.29   CALCIUM 9.1     Recent Labs     12/15/19  0913   ALTSGPT 19   ASTSGOT 14   ALKPHOSPHAT 77   TBILIRUBIN 0.8   GLUCOSE 157*     Recent Labs     12/15/19  0913   APTT 41.1*   INR 2.79*     No results for input(s): NTPROBNP in the last 72 hours.      No results for input(s): TROPONINT in the last 72 hours.    Urinalysis:    Recent Labs     12/15/19  0951   SPECGRAVITY >=1.030  >=1.030   GLUCOSEUR Negative   KETONES Negative   NITRITE Negative   LEUKESTERAS Negative   WBCURINE 0-2*   RBCURINE >150*   BACTERIA Negative   EPITHELCELL Rare        Imaging:  CT-ABDOMEN & PELVIS UROGRAM    (Results Pending)          Assessment/Plan:  I anticipate this patient will require at least two midnights for appropriate medical management, necessitating inpatient admission.    Gross hematuria- (present on admission)  Assessment &  Plan  In the setting of anticoagulation with Coumadin  Underlying prostatic symptoms, benign prostatic hypertrophy  At this time a three-way Fragoso catheter will be placed  Patient will be initiated on CBI  We will increase Flomax to 0.8 mg and add finasteride  We will obtain CT abdomen and pelvis, urogram protocol  Closely titrate Coumadin within therapeutic window's  Monitor hemoglobin, INR levels    If failure to resolve over the next 24 to 48 hours, urology consultation for cystoscopic evaluation, may need to hold Coumadin in that setting, for now continuing Coumadin to see if would resolve with CBI    Paroxysmal atrial fibrillation (HCC)- (present on admission)  Assessment & Plan  With rapid ventricular rate, likely valvular A. fib  Pacemaker interrogation requested, pending at this time  Metoprolol transitioned to 25 mg every 6 hourly with holding parameters, at home on 25 mg twice daily  X1 dose of IV metoprolol 5 mg  Continue Coumadin, monitor INR  Monitor on telemetry  If persistent, failure to control, obtain echocardiogram and consult cardiology    Type 2 diabetes mellitus without complication, without long-term current use of insulin (HCC)- (present on admission)  Assessment & Plan  Holding at home regimen  Sliding scale insulin No. 1  Titrate to achieve normal glycemic control    Obesity (BMI 30-39.9)- (present on admission)  Assessment & Plan  Body mass index is 37.9 kg/m².    Benign prostatic hyperplasia without lower urinary tract symptoms- (present on admission)  Assessment & Plan  Persistent symptoms despite Flomax 0.4 mg  Increase Flomax to 0.8 mg  Initiate finasteride  Outpatient urology follow-up    Deep vein thrombosis (HCC) [I82.409]- (present on admission)  Assessment & Plan  History of, on Coumadin    Essential hypertension, benign- (present on admission)  Assessment & Plan  Metoprolol, titrate to achieve normotensive control    H/O mitral valve replacement- (present on admission)  Assessment  & Plan  Continue Coumadin, outpatient cardiology follow-up    Acquired circulating anticoagulants (HCC)- (present on admission)  Assessment & Plan  Patient is on Coumadin for underlying history of DVT, mitral valve replacement, A. Fib  Patient is now presenting with gross hematuria  We will be titrating Coumadin within the therapeutic windows  We will closely monitor hemoglobin, bleeding and INR levels  If persistent bleeding, Coumadin may need to be held      VTE prophylaxis: Coumadin

## 2019-12-15 NOTE — ED TRIAGE NOTES
BIB EMS from home with   Chief Complaint   Patient presents with   • Urinating Blood   States one episode of bright red blood upon urination this morning. On coumadin for A Fib. Denies fever, chills, dysuria, or abd/flank pain. Hx of kidney stones. ERP at bedside. Blood sent to lab.

## 2019-12-15 NOTE — ED NOTES
Patient given Urine collection cup and cleansing cloth packet. Patient given verbal instruction of proper collection of clean catch urine specimen. Patient instructed to return collection cup to room or appropriate staff when finished.

## 2019-12-15 NOTE — ED NOTES
Patient is currently resting comfortably in room.  No visible signs or symptoms of distress noted.  Will continue to monitor.

## 2019-12-15 NOTE — PROGRESS NOTES
Inpatient Anticoagulation Service Note    Date: 12/15/2019    Reason for Anticoagulation: Atrial Fibrillation, Deep Vein Thrombosis, H/O Mitral Valve Replacement   Target INR: 2.5 to 3.5     Hemoglobin Value: (!) 13.2  Hematocrit Value: (!) 40.7  Lab Platelet Value: (!) 129    INR from last 7 days     Date/Time INR Value    12/15/19 0913  (!) 2.79        Dose from last 7 days     Date/Time Dose (mg)    12/15/19 1402  5        Average Dose (mg): 7 mg PO every Wednesday and 5 mg PO all other days  Significant Interactions: Not Applicable  Bridge Therapy: No   Reversal Agent Administered: Not Applicable    Comments: Mr. Olivier is a 63 y/o male on warfarin for hx of DVT, AFib and mitral valve replacement, who is followed by the anticoagulation clinic. The patient presented to the ER today for hematuria and AFIb w/ RVR. Per the admitting MD, continue warfarin at this time, but if hematuria does not resolve over the next 24-48 hours, Urology will be consulted for cystoscopic evaluation, and warfarin may need to be held. H/H appear stable at this time. INR is currently therapeutic.    Plan:  Continue warfarin home regimen of 7 mg on Wednesdays and 5 mg all other days. Will obtain an INR with AM labs. Pharmacy will continue to follow.      Education Material Provided?: No    Pharmacist suggested discharge dosing: TBD based on subsequent INRs. May be able to continue current regimen of warfarin 7 mg PO daily every Wednesday and 5 mg PO daily all other days of the week. Obtain a follow up INR within 72h of discharge.     Laurel Patrick, PharmD, BCPS

## 2019-12-16 ENCOUNTER — PATIENT OUTREACH (OUTPATIENT)
Dept: HEALTH INFORMATION MANAGEMENT | Facility: OTHER | Age: 64
End: 2019-12-16

## 2019-12-16 VITALS
OXYGEN SATURATION: 95 % | BODY MASS INDEX: 37.81 KG/M2 | TEMPERATURE: 96.5 F | WEIGHT: 264.11 LBS | HEIGHT: 70 IN | SYSTOLIC BLOOD PRESSURE: 149 MMHG | RESPIRATION RATE: 19 BRPM | HEART RATE: 80 BPM | DIASTOLIC BLOOD PRESSURE: 94 MMHG

## 2019-12-16 PROBLEM — N20.0 RIGHT RENAL STONE: Status: ACTIVE | Noted: 2019-12-16

## 2019-12-16 PROBLEM — R31.0 GROSS HEMATURIA: Status: RESOLVED | Noted: 2019-12-15 | Resolved: 2019-12-16

## 2019-12-16 LAB
ALBUMIN SERPL BCP-MCNC: 4.5 G/DL (ref 3.2–4.9)
ALBUMIN/GLOB SERPL: 1.9 G/DL
ALP SERPL-CCNC: 80 U/L (ref 30–99)
ALT SERPL-CCNC: 17 U/L (ref 2–50)
ANION GAP SERPL CALC-SCNC: 10 MMOL/L (ref 0–11.9)
AST SERPL-CCNC: 16 U/L (ref 12–45)
BASOPHILS # BLD AUTO: 0.4 % (ref 0–1.8)
BASOPHILS # BLD: 0.04 K/UL (ref 0–0.12)
BILIRUB SERPL-MCNC: 0.7 MG/DL (ref 0.1–1.5)
BUN SERPL-MCNC: 19 MG/DL (ref 8–22)
CALCIUM SERPL-MCNC: 9.3 MG/DL (ref 8.5–10.5)
CHLORIDE SERPL-SCNC: 101 MMOL/L (ref 96–112)
CO2 SERPL-SCNC: 23 MMOL/L (ref 20–33)
CREAT SERPL-MCNC: 1.36 MG/DL (ref 0.5–1.4)
EOSINOPHIL # BLD AUTO: 0.2 K/UL (ref 0–0.51)
EOSINOPHIL NFR BLD: 1.8 % (ref 0–6.9)
ERYTHROCYTE [DISTWIDTH] IN BLOOD BY AUTOMATED COUNT: 54.3 FL (ref 35.9–50)
GLOBULIN SER CALC-MCNC: 2.4 G/DL (ref 1.9–3.5)
GLUCOSE BLD-MCNC: 133 MG/DL (ref 65–99)
GLUCOSE BLD-MCNC: 171 MG/DL (ref 65–99)
GLUCOSE SERPL-MCNC: 200 MG/DL (ref 65–99)
HCT VFR BLD AUTO: 42.5 % (ref 42–52)
HGB BLD-MCNC: 14 G/DL (ref 14–18)
IMM GRANULOCYTES # BLD AUTO: 0.1 K/UL (ref 0–0.11)
IMM GRANULOCYTES NFR BLD AUTO: 0.9 % (ref 0–0.9)
INR PPP: 2.41 (ref 0.87–1.13)
LYMPHOCYTES # BLD AUTO: 1.09 K/UL (ref 1–4.8)
LYMPHOCYTES NFR BLD: 9.7 % (ref 22–41)
MAGNESIUM SERPL-MCNC: 2 MG/DL (ref 1.5–2.5)
MCH RBC QN AUTO: 32.9 PG (ref 27–33)
MCHC RBC AUTO-ENTMCNC: 32.9 G/DL (ref 33.7–35.3)
MCV RBC AUTO: 100 FL (ref 81.4–97.8)
MONOCYTES # BLD AUTO: 0.77 K/UL (ref 0–0.85)
MONOCYTES NFR BLD AUTO: 6.9 % (ref 0–13.4)
NEUTROPHILS # BLD AUTO: 9 K/UL (ref 1.82–7.42)
NEUTROPHILS NFR BLD: 80.3 % (ref 44–72)
NRBC # BLD AUTO: 0 K/UL
NRBC BLD-RTO: 0 /100 WBC
PHOSPHATE SERPL-MCNC: 3.6 MG/DL (ref 2.5–4.5)
PLATELET # BLD AUTO: 171 K/UL (ref 164–446)
PMV BLD AUTO: 9.7 FL (ref 9–12.9)
POTASSIUM SERPL-SCNC: 5.2 MMOL/L (ref 3.6–5.5)
PROT SERPL-MCNC: 6.9 G/DL (ref 6–8.2)
PROTHROMBIN TIME: 27.1 SEC (ref 12–14.6)
RBC # BLD AUTO: 4.25 M/UL (ref 4.7–6.1)
SODIUM SERPL-SCNC: 134 MMOL/L (ref 135–145)
WBC # BLD AUTO: 11.2 K/UL (ref 4.8–10.8)

## 2019-12-16 PROCEDURE — 99239 HOSP IP/OBS DSCHRG MGMT >30: CPT | Performed by: HOSPITALIST

## 2019-12-16 PROCEDURE — 82962 GLUCOSE BLOOD TEST: CPT

## 2019-12-16 PROCEDURE — 700102 HCHG RX REV CODE 250 W/ 637 OVERRIDE(OP): Performed by: INTERNAL MEDICINE

## 2019-12-16 PROCEDURE — 84100 ASSAY OF PHOSPHORUS: CPT

## 2019-12-16 PROCEDURE — 83735 ASSAY OF MAGNESIUM: CPT

## 2019-12-16 PROCEDURE — 85025 COMPLETE CBC W/AUTO DIFF WBC: CPT

## 2019-12-16 PROCEDURE — 80053 COMPREHEN METABOLIC PANEL: CPT

## 2019-12-16 PROCEDURE — 36415 COLL VENOUS BLD VENIPUNCTURE: CPT

## 2019-12-16 PROCEDURE — 85610 PROTHROMBIN TIME: CPT

## 2019-12-16 PROCEDURE — A9270 NON-COVERED ITEM OR SERVICE: HCPCS | Performed by: HOSPITALIST

## 2019-12-16 PROCEDURE — A9270 NON-COVERED ITEM OR SERVICE: HCPCS | Performed by: INTERNAL MEDICINE

## 2019-12-16 PROCEDURE — 700102 HCHG RX REV CODE 250 W/ 637 OVERRIDE(OP): Performed by: HOSPITALIST

## 2019-12-16 RX ORDER — CALCIUM CARBONATE 500 MG/1
500 TABLET, CHEWABLE ORAL 4 TIMES DAILY PRN
Status: DISCONTINUED | OUTPATIENT
Start: 2019-12-16 | End: 2019-12-16 | Stop reason: HOSPADM

## 2019-12-16 RX ORDER — TAMSULOSIN HYDROCHLORIDE 0.4 MG/1
0.8 CAPSULE ORAL ONCE
Status: COMPLETED | OUTPATIENT
Start: 2019-12-16 | End: 2019-12-16

## 2019-12-16 RX ADMIN — ALLOPURINOL 100 MG: 100 TABLET ORAL at 05:49

## 2019-12-16 RX ADMIN — TAMSULOSIN HYDROCHLORIDE 0.8 MG: 0.4 CAPSULE ORAL at 08:20

## 2019-12-16 RX ADMIN — HYDROCODONE BITARTRATE AND ACETAMINOPHEN 2 TABLET: 5; 325 TABLET ORAL at 06:13

## 2019-12-16 RX ADMIN — METOPROLOL TARTRATE 25 MG: 25 TABLET, FILM COATED ORAL at 12:52

## 2019-12-16 RX ADMIN — POLYETHYLENE GLYCOL 3350 1 PACKET: 17 POWDER, FOR SOLUTION ORAL at 01:56

## 2019-12-16 RX ADMIN — METOPROLOL TARTRATE 25 MG: 25 TABLET, FILM COATED ORAL at 00:35

## 2019-12-16 RX ADMIN — HYDROCODONE BITARTRATE AND ACETAMINOPHEN 2 TABLET: 5; 325 TABLET ORAL at 12:52

## 2019-12-16 RX ADMIN — HYDROCODONE BITARTRATE AND ACETAMINOPHEN 2 TABLET: 5; 325 TABLET ORAL at 00:35

## 2019-12-16 RX ADMIN — INSULIN HUMAN 1 UNITS: 100 INJECTION, SOLUTION PARENTERAL at 12:57

## 2019-12-16 RX ADMIN — FINASTERIDE 5 MG: 5 TABLET, FILM COATED ORAL at 05:49

## 2019-12-16 RX ADMIN — METOPROLOL TARTRATE 25 MG: 25 TABLET, FILM COATED ORAL at 05:50

## 2019-12-16 ASSESSMENT — CHA2DS2 SCORE
CHA2DS2 VASC SCORE: 3
HYPERTENSION: YES
AGE 75 OR GREATER: NO
AGE 65 TO 74: YES
CHF OR LEFT VENTRICULAR DYSFUNCTION: NO
VASCULAR DISEASE: NO
PRIOR STROKE OR TIA OR THROMBOEMBOLISM: NO
SEX: MALE
DIABETES: YES

## 2019-12-16 ASSESSMENT — LIFESTYLE VARIABLES
CONSUMPTION TOTAL: INCOMPLETE
AVERAGE NUMBER OF DAYS PER WEEK YOU HAVE A DRINK CONTAINING ALCOHOL: 0
HOW MANY TIMES IN THE PAST YEAR HAVE YOU HAD 5 OR MORE DRINKS IN A DAY: 0
ON A TYPICAL DAY WHEN YOU DRINK ALCOHOL HOW MANY DRINKS DO YOU HAVE: 0

## 2019-12-16 NOTE — CARE PLAN
Problem: Communication  Goal: The ability to communicate needs accurately and effectively will improve  Outcome: PROGRESSING AS EXPECTED  Note:   Pt able to communicate needs      Problem: Safety  Goal: Will remain free from injury  Outcome: PROGRESSING AS EXPECTED  Note:   Pt remains free of injury

## 2019-12-16 NOTE — CARE PLAN
Problem: Infection  Goal: Will remain free from infection  Outcome: PROGRESSING AS EXPECTED  Intervention: Implement standard precautions and perform hand washing before and after patient contact  Note:   Hand hygiene performed before and after all patient care.     Problem: Safety  Goal: Will remain free from falls  Outcome: PROGRESSING AS EXPECTED  Intervention: Implement fall precautions  Flowsheets (Taken 12/15/2019 8590)  Environmental Precautions: Treaded Slipper Socks on Patient; Transferred to Stronger Side; Bed in Low Position; Mobility Assessed & Appropriate Sign Placed; Personal Belongings, Wastebasket, Call Bell etc. in Easy Reach; Report Given to Other Health Care Providers Regarding Fall Risk; Communication Sign for Patients & Families

## 2019-12-16 NOTE — PROGRESS NOTES
Inpatient Anticoagulation Service Note    Date: 12/16/2019    Reason for Anticoagulation: Atrial Fibrillation, Deep Vein Thrombosis(H/O mitral valve replacement)   Target INR: 2.5 to 3.5  [id number] VASc Score: 3  HAS-BLED Score: 1   Hemoglobin Value: 14  Hematocrit Value: 42.5  Lab Platelet Value: 171    INR from last 7 days     Date/Time INR Value    12/16/19 0337  (!) 2.41    12/15/19 0913  (!) 2.79        Dose from last 7 days     Date/Time Dose (mg)    12/16/19 0810  5    12/15/19 1402  5        Average Dose (mg): (7 mg PO every Wednesday and 5 mg PO all other days)  Significant Interactions: Not Applicable  Bridge Therapy: No     Reversal Agent Administered: Not Applicable  Comments: 65 y/o male on warfarin for hx of DVT, AFIb and mitral valve replacement who is followed by the anticoagulation clinic. The patient presented to the ER for hematuria and AFIb w/ RVR. Urine color clear after recent bladder irrigation per chart review.  H/H stable.  No other s/sx of bleeding.  INR slightly below goal, will continue home regimen, however if INR remains < 2.5, will recommend bridge therapy due to presence of mechanical mitral valve.  INR in AM.    Plan:  Warfarin 5mg. INR in AM.  Education Material Provided?: No(chronic warfarin patient)  Pharmacist suggested discharge dosing: Resume home regimen of 7 mg PO every Wednesday and 5 mg PO all other days with follow up within 3 days of discharge.     Len Muñoz

## 2019-12-16 NOTE — DISCHARGE PLANNING
"Anticipated Discharge Disposition: Home without skilled needs.    Action: Assessment done. Pt lives on ground floor of an apartment ([facility]) which he \"really like it there\". Pt brings in $912/mo in social security disability and pays $300 in rent. Pt uses [facility] pharmacy on [facility]. Pt uses the [employer], has the $15/month coupons worth $60 in rides. Pt uses a cane. Pt states a local seniors program delivers a cooked meal to his apartment every day, Monday through Friday, otherwise he eats out. His two sisters live in [place].    Barriers to Discharge: Medical clearance.    Plan: CM verified pt does not have MTM benefit. CM to assist with transportation home if pt does not have his cab coupons with him.    "

## 2019-12-16 NOTE — PROGRESS NOTES
Urethral catheter 18F placed per order with continuous bladder irrigation. Flushed with 100ml irrigant, urine straw colored.

## 2019-12-16 NOTE — DISCHARGE SUMMARY
Discharge Summary    CHIEF COMPLAINT ON ADMISSION  Chief Complaint   Patient presents with   • Urinating Blood       Reason for Admission  Painless hematuria     Admission Date  12/15/2019    CODE STATUS  Full Code    HPI & HOSPITAL COURSE  This is a 64 y.o. male here with chronic afib, bioprosthetic MVR on chronic Coumadin followed by the Coumadin clinic gout hypertension diabetes mellitus type 2 pacemaker placement and history DVT for which he is on Coumadin as well.  He states that he noticed gross hematuria while at home and came immediately to the emergency room.  He does have a history of BPH on Flomax but does not follow a urologist regularly.  He denies any difficulty with stream.  Denies any trauma.  He specifically denies any flank pain.  He does not take any NSAIDs or other blood thinners.  On admission a CBI Fragoso catheter was placed.  Per bedside nursing he has not had any evidence of hematuria since he was admitted with the CBI.  His CT scan without contrast did show a right 3.8 mm nonobstructing right renal pelvis renal stone.  No hydronephrosis noted.  Since his painless hematuria had resolved, his CBI was removed.  Cyndie in atrial fibrillation but rate controlled on his home metoprolol.  He will continue on his Coumadin and follow-up with urology as an outpatient within the next 2 weeks.  Likely the small right nonobstructing renal stone was the cause of his painless hematuria.  His hemoglobin remains 13-14 overnight despite IV fluids.  He has by all means stable with stable vital signs CBC and BMP.  The patient is ambulating well eating well and ready for discharge to home.  Of note urinalysis was obtained showing 2-5 WBCs however gross hematuria was noted 100 250.  No infection or dysuria noted per patient.  I did review patient's recent echocardiogram 9/2019 that showed a normal functioning bioprosthetic mitral valve.  Normal EF.       Therefore, he is discharged in good and stable condition to  home with close outpatient follow-up.    The patient recovered much more quickly than anticipated on admission.    Discharge Date  12/16/19    FOLLOW UP ITEMS POST DISCHARGE  Follow up with urologist as outpatient regarding right nonobstructing renal stone, likely source of painless hematuria that has since resolved.    DISCHARGE DIAGNOSES  Active Problems:    Paroxysmal atrial fibrillation (HCC) POA: Yes    Right renal stone POA: Yes    Acquired circulating anticoagulants (HCC) POA: Yes    H/O mitral valve replacement POA: Yes    Essential hypertension, benign POA: Yes    Deep vein thrombosis (HCC) [I82.409] POA: Yes    Benign prostatic hyperplasia without lower urinary tract symptoms POA: Yes    Obesity (BMI 30-39.9) POA: Yes    Type 2 diabetes mellitus without complication, without long-term current use of insulin (HCC) POA: Yes  Resolved Problems:    Gross hematuria POA: Yes      FOLLOW UP  Future Appointments   Date Time Provider Department Center   1/9/2020 11:00 AM [facility] EXAM 4 [facility] None   6/16/2020  1:00 PM VASCULAR NURSE PRACTITIONER [facility] None     No follow-up provider specified.    MEDICATIONS ON DISCHARGE     Medication List      CONTINUE taking these medications      Instructions   acetaminophen 500 MG Tabs  Commonly known as:  TYLENOL   Take 500-1,000 mg by mouth every 6 hours as needed for Moderate Pain.  Dose:  500-1,000 mg     albuterol 108 (90 Base) MCG/ACT Aers inhalation aerosol   Inhale 2 Puffs by mouth every 6 hours as needed for Shortness of Breath.  Dose:  2 Puff     allopurinol 100 MG Tabs  Commonly known as:  ZYLOPRIM   Take 1 Tab by mouth every day for 180 days.  Dose:  100 mg     metFORMIN 500 MG Tabs  Commonly known as:  GLUCOPHAGE   Take 1 Tab by mouth 2 times a day, with meals for 180 days.  Dose:  500 mg     metoprolol 25 MG Tabs  Commonly known as:  LOPRESSOR   Take 1 Tab by mouth 2 times a day for 180 days.  Dose:  25 mg     tamsulosin 0.4 MG capsule  Commonly known as:  FLOMAX   Take  1 Cap by mouth ONE-HALF HOUR AFTER BREAKFAST for 180 days.  Dose:  0.4 mg     warfarin 5 MG Tabs  Commonly known as:  COUMADIN   Take 5-7 mg by mouth every evening. 7 mg on Wednesday and 5 mg all other days  Dose:  5-7 mg            Allergies  No Known Allergies    DIET  Orders Placed This Encounter   Procedures   • Diet Order Diabetic     Standing Status:   Standing     Number of Occurrences:   1     Order Specific Question:   Diet:     Answer:   Diabetic [3]       ACTIVITY  As tolerated.  Weight bearing as tolerated    CONSULTATIONS  none    PROCEDURES  12/15/2019 5:39 PM     HISTORY/REASON FOR EXAM:  Hematuria.        TECHNIQUE/EXAM DESCRIPTION:  CT Urogram     Initial precontrast images were obtained from the diaphragmatic domes through the pubic symphysis using helical technique.     Following this, 100 mL of Omnipaque 350 nonionic contrast was administered, and postcontrast nephrographic phase thin-section helical scanning obtained from the diaphragmatic domes through the pubic symphysis.     Additional 10-minute delayed imaging is performed from the diaphragmatic domes through the pubic symphysis to evaluate the ureters. Coronal MIP reconstructions of the delayed phase are also performed.     Low dose optimization technique was utilized for this CT exam including automated exposure control and adjustment of the mA and/or kV according to patient size.     COMPARISON: None.     FINDINGS:  Lung bases: The lung bases are clear.     Kidneys: No left hydronephrosis or nephrolithiasis is present.  The left ureter appears normal.  No right hydronephrosis is present.  There is a punctate 3.8 mm calcification in the right renal pelvis near the UPJ.  The remainder of the right ureter appears normal.     Renal collecting system: There are no ureter calcifications. There is no mass or filling defect within the renal collecting system.     Bladder: Within normal limits     Liver: The liver is unremarkable.     Spleen:  Spleen is unremarkable.     Biliary system: Gallbladder and biliary tree are unremarkable.     Pancreas: Pancreas is unremarkable.     Adrenals: Adrenal glands are unremarkable.     Vasculature: Renal veins are patent.     Lymph nodes: There are no enlarged lymph nodes.     Bowel: There is no evidence of bowel obstruction or inflammation.  There is a small ventral hernia superior to the umbilicus in the midline containing mesenteric fat. The sac measures 5.2 cm in diameter. The diastases of the rectus sheath measures 3 cm. No adjacent bowel obstruction is identified.     Bones: Unremarkable.           IMPRESSION:     1.  3.8 mm stone in the right renal pelvis near the right UPJ. No right hydronephrosis or other ureteral stone.     2.  No left nephrolithiasis or hydronephrosis.     3.  No other filling defect identified in the intrarenal collecting system or ureters.     4.  Normal appearance of the bladder.     5.  Small supraumbilical ventral hernia containing mesenteric fat. No evidence of bowel obstruction.          LABORATORY  Lab Results   Component Value Date    SODIUM 134 (L) 12/16/2019    POTASSIUM 5.2 12/16/2019    CHLORIDE 101 12/16/2019    CO2 23 12/16/2019    GLUCOSE 200 (H) 12/16/2019    BUN 19 12/16/2019    CREATININE 1.36 12/16/2019    CREATININE 1.4 04/27/2009        Lab Results   Component Value Date    WBC 11.2 (H) 12/16/2019    HEMOGLOBIN 14.0 12/16/2019    HEMATOCRIT 42.5 12/16/2019    PLATELETCT 171 12/16/2019        Total time of the discharge process exceeds 40 minutes.

## 2019-12-16 NOTE — PROGRESS NOTES
Received report from NOC shift RN. Patient is A&Ox4, denies pain, VSS, no signs of distress. All questions and concerns answered, bed in lowest and locked position, call light in reach, will continue to monitor.

## 2019-12-16 NOTE — DISCHARGE PLANNING
Pt does not have MTM benefit and his neighbor is not available for ride. Taxi voucher #[id number] given to bedside nurse.

## 2019-12-16 NOTE — PROGRESS NOTES
Continuous bladder irrigation clamped at 2000, grover has had clear yellow urine for duration of night.

## 2019-12-16 NOTE — PROGRESS NOTES
2 RN Skin Check    2 RN skin check complete.   Devices in place: n/a.  Skin assessed under devices: N\A.  Confirmed pressure ulcers found on: N/A. .   New potential pressure ulcers noted on N/A. Blancheable redness to heels, sacrum, anterior right knee.   Wound consult placed N/A.  The following interventions in place Pillows.

## 2019-12-16 NOTE — RESPIRATORY CARE
COPD EDUCATION by COPD CLINICAL EDUCATOR  12/16/2019 at 6:05 AM by Fifi Gibson     Patient reviewed by COPD education team. Patient does not have a history or diagnosis of COPD and is a non-smoker, therefore does not qualify for the COPD program.

## 2019-12-16 NOTE — DISCHARGE INSTRUCTIONS
Discharge Instructions    Discharge Instructions    Discharged to home by taxi with self. Discharged via walking, hospital escort: Yes.  Special equipment needed: Not Applicable    Be sure to schedule a follow-up appointment with your primary care doctor or any specialists as instructed.     Discharge Plan:   Diet Plan: Discussed  Activity Level: Discussed  Confirmed Follow up Appointment: Patient to Call and Schedule Appointment  Confirmed Symptoms Management: Discussed  Medication Reconciliation Updated: Yes  Influenza Vaccine Indication: Not indicated: Previously immunized this influenza season and > 8 years of age    I understand that a diet low in cholesterol, fat, and sodium is recommended for good health. Unless I have been given specific instructions below for another diet, I accept this instruction as my diet prescription.   Other diet: Diabetic    Special Instructions: None    · Is patient discharged on Warfarin / Coumadin?   Yes    You are receiving the drug warfarin. Please understand the importance of monitoring warfarin with scheduled PT/INR blood draws.  Follow-up with a call to your personal Doctor's office in 3 days to schedule a PT/INR. .    IMPORTANT: HOW TO USE THIS INFORMATION:  This is a summary and does NOT have all possible information about this product. This information does not assure that this product is safe, effective, or appropriate for you. This information is not individual medical advice and does not substitute for the advice of your health care professional. Always ask your health care professional for complete information about this product and your specific health needs.      WARFARIN - ORAL (WARF-uh-rin)      COMMON BRAND NAME(S): Coumadin      WARNING:  Warfarin can cause very serious (possibly fatal) bleeding. This is more likely to occur when you first start taking this medication or if you take too much warfarin. To decrease your risk for bleeding, your doctor or other  "health care provider will monitor you closely and check your lab results (INR test) to make sure you are not taking too much warfarin. Keep all medical and laboratory appointments. Tell your doctor right away if you notice any signs of serious bleeding. See also Side Effects section.      USES:  This medication is used to treat blood clots (such as in deep vein thrombosis-DVT or pulmonary embolus-PE) and/or to prevent new clots from forming in your body. Preventing harmful blood clots helps to reduce the risk of a stroke or heart attack. Conditions that increase your risk of developing blood clots include a certain type of irregular heart rhythm (atrial fibrillation), heart valve replacement, recent heart attack, and certain surgeries (such as hip/knee replacement). Warfarin is commonly called a \"blood thinner,\" but the more correct term is \"anticoagulant.\" It helps to keep blood flowing smoothly in your body by decreasing the amount of certain substances (clotting proteins) in your blood.      HOW TO USE:  Read the Medication Guide provided by your pharmacist before you start taking warfarin and each time you get a refill. If you have any questions, ask your doctor or pharmacist. Take this medication by mouth with or without food as directed by your doctor or other health care professional, usually once a day. It is very important to take it exactly as directed. Do not increase the dose, take it more frequently, or stop using it unless directed by your doctor. Dosage is based on your medical condition, laboratory tests (such as INR), and response to treatment. Your doctor or other health care provider will monitor you closely while you are taking this medication to determine the right dose for you. Use this medication regularly to get the most benefit from it. To help you remember, take it at the same time each day. It is important to eat a balanced, consistent diet while taking warfarin. Some foods can affect how " warfarin works in your body and may affect your treatment and dose. Avoid sudden large increases or decreases in your intake of foods high in vitamin K (such as broccoli, cauliflower, cabbage, brussels sprouts, kale, spinach, and other green leafy vegetables, liver, green tea, certain vitamin supplements). If you are trying to lose weight, check with your doctor before you try to go on a diet. Cranberry products may also affect how your warfarin works. Limit the amount of cranberry juice (16 ounces/480 milliliters a day) or other cranberry products you may drink or eat.      SIDE EFFECTS:  Nausea, loss of appetite, or stomach/abdominal pain may occur. If any of these effects persist or worsen, tell your doctor or pharmacist promptly. Remember that your doctor has prescribed this medication because he or she has judged that the benefit to you is greater than the risk of side effects. Many people using this medication do not have serious side effects. This medication can cause serious bleeding if it affects your blood clotting proteins too much (shown by unusually high INR lab results). Even if your doctor stops your medication, this risk of bleeding can continue for up to a week. Tell your doctor right away if you have any signs of serious bleeding, including: unusual pain/swelling/discomfort, unusual/easy bruising, prolonged bleeding from cuts or gums, persistent/frequent nosebleeds, unusually heavy/prolonged menstrual flow, pink/dark urine, coughing up blood, vomit that is bloody or looks like coffee grounds, severe headache, dizziness/fainting, unusual or persistent tiredness/weakness, bloody/black/tarry stools, chest pain, shortness of breath, difficulty swallowing. Tell your doctor right away if any of these unlikely but serious side effects occur: persistent nausea/vomiting, severe stomach/abdominal pain, yellowing eyes/skin. This drug rarely has caused very serious (possibly fatal) problems if its effects lead  to small blood clots (usually at the beginning of treatment). This can lead to severe skin/tissue damage that may require surgery or amputation if left untreated. Patients with certain blood conditions (protein C or S deficiency) may be at greater risk. Get medical help right away if any of these rare but serious side effects occur: painful/red/purplish patches on the skin (such as on the toe, breast, abdomen), change in the amount of urine, vision changes, confusion, slurred speech, weakness on one side of the body. A very serious allergic reaction to this drug is rare. However, get medical help right away if you notice any symptoms of a serious allergic reaction, including: rash, itching/swelling (especially of the face/tongue/throat), severe dizziness, trouble breathing. This is not a complete list of possible side effects. If you notice other effects not listed above, contact your doctor or pharmacist. In the [place] - Call your doctor for medical advice about side effects. You may report side effects to FDA at 8-895-FHL-5867. In [place] - Call your doctor for medical advice about side effects. You may report side effects to [facility] [place] at 1-478.718.4790.      PRECAUTIONS:  Before taking warfarin, tell your doctor or pharmacist if you are allergic to it; or if you have any other allergies. This product may contain inactive ingredients, which can cause allergic reactions or other problems. Talk to your pharmacist for more details. Before using this medication, tell your doctor or pharmacist your medical history, especially of: blood disorders (such as anemia, hemophilia), bleeding problems (such as bleeding of the stomach/intestines, bleeding in the brain), blood vessel disorders (such as aneurysms), recent major injury/surgery, liver disease, alcohol use, mental/mood disorders (including memory problems), frequent falls/injuries. It is important that all your doctors and dentists know that you take warfarin. Before  having surgery or any medical/dental procedures, tell your doctor or dentist that you are taking this medication and about all the products you use (including prescription drugs, nonprescription drugs, and herbal products). Avoid getting injections into the muscles. If you must have an injection into a muscle (for example, a flu shot), it should be given in the arm. This way, it will be easier to check for bleeding and/or apply pressure bandages. This medication may cause stomach bleeding. Daily use of alcohol while using this medicine will increase your risk for stomach bleeding and may also affect how this medication works. Limit or avoid alcoholic beverages. If you have not been eating well, if you have an illness or infection that causes fever, vomiting, or diarrhea for more than 2 days, or if you start using any antibiotic medications, contact your doctor or pharmacist immediately because these conditions can affect how warfarin works. This medication can cause heavy bleeding. To lower the chance of getting cut, bruised, or injured, use great caution with sharp objects like safety razors and nail cutters. Use an electric razor when shaving and a soft toothbrush when brushing your teeth. Avoid activities such as contact sports. If you fall or injure yourself, especially if you hit your head, call your doctor immediately. Your doctor may need to check you. The Food & Drug Administration has stated that generic warfarin products are interchangeable. However, consult your doctor or pharmacist before switching warfarin products. Be careful not to take more than one medication that contains warfarin unless specifically directed by the doctor or health care provider who is monitoring your warfarin treatment. Older adults may be at greater risk for bleeding while using this drug. This medication is not recommended for use during pregnancy because of serious (possibly fatal) harm to an unborn baby. Discuss the use of  "reliable forms of birth control with your doctor. If you become pregnant or think you may be pregnant, tell your doctor immediately. If you are planning pregnancy, discuss a plan for managing your condition with your doctor before you become pregnant. Your doctor may switch the type of medication you use during pregnancy. Very small amounts of this medication may pass into breast milk but is unlikely to harm a nursing infant. Consult your doctor before breast-feeding.      DRUG INTERACTIONS:  Drug interactions may change how your medications work or increase your risk for serious side effects. This document does not contain all possible drug interactions. Keep a list of all the products you use (including prescription/nonprescription drugs and herbal products) and share it with your doctor and pharmacist. Do not start, stop, or change the dosage of any medicines without your doctor's approval. Warfarin interacts with many prescription, nonprescription, vitamin, and herbal products. This includes medications that are applied to the skin or inside the vagina or rectum. The interactions with warfarin usually result in an increase or decrease in the \"blood-thinning\" (anticoagulant) effect. Your doctor or other health care professional should closely monitor you to prevent serious bleeding or clotting problems. While taking warfarin, it is very important to tell your doctor or pharmacist of any changes in medications, vitamins, or herbal products that you are taking. Some products that may interact with this drug include: capecitabine, imatinib, mifepristone. Aspirin, aspirin-like drugs (salicylates), and nonsteroidal anti-inflammatory drugs (NSAIDs such as ibuprofen, naproxen, celecoxib) may have effects similar to warfarin. These drugs may increase the risk of bleeding problems if taken during treatment with warfarin. Carefully check all prescription/nonprescription product labels (including drugs applied to the skin " such as pain-relieving creams) since the products may contain NSAIDs or salicylates. Talk to your doctor about using a different medication (such as acetaminophen) to treat pain/fever. Low-dose aspirin and related drugs (such as clopidogrel, ticlopidine) should be continued if prescribed by your doctor for specific medical reasons such as heart attack or stroke prevention. Consult your doctor or pharmacist for more details. Many herbal products interact with warfarin. Tell your doctor before taking any herbal products, especially bromelains, coenzyme Q10, cranberry, danshen, dong quai, fenugreek, garlic, ginkgo biloba, ginseng, and Hartley's wort, among others. This medication may interfere with a certain laboratory test to measure theophylline levels, possibly causing false test results. Make sure laboratory personnel and all your doctors know you use this drug.      OVERDOSE:  If overdose is suspected, contact a poison control center or emergency room immediately.  residents can call the  National Poison Hotline at 1-588.329.6831. [place] residents can call a provincial poison control center. Symptoms of overdose may include: bloody/black/tarry stools, pink/dark urine, unusual/prolonged bleeding.      NOTES:  Do not share this medication with others. Laboratory and/or medical tests (such as INR, complete blood count) must be performed periodically to monitor your progress or check for side effects. Consult your doctor for more details.      MISSED DOSE:  For the best possible benefit, do not miss any doses. If you do miss a dose and remember on the same day, take it as soon as you remember. If you remember on the next day, skip the missed dose and resume your usual dosing schedule. Do not double the dose to catch up because this could increase your risk for bleeding. Keep a record of missed doses to give to your doctor or pharmacist. Contact your doctor or pharmacist if you miss 2 or more doses in a row.       STORAGE:  Store at room temperature away from light and moisture. Do not store in the bathroom. Keep all medications away from children and pets. Do not flush medications down the toilet or pour them into a drain unless instructed to do so. Properly discard this product when it is  or no longer needed. Consult your pharmacist or local waste disposal company for more details about how to safely discard your product.      MEDICAL ALERT:  Your condition and medication can cause complications in a medical emergency. For information about enrolling in MedicAlert, call 1-585.976.4461 ([place]) or 1-380.585.4745 ([place]).      Information last revised 2010 Copyright(c)  [employer]             Depression / Suicide Risk    As you are discharged from this [facility] facility, it is important to learn how to keep safe from harming yourself.    Recognize the warning signs:  · Abrupt changes in personality, positive or negative- including increase in energy   · Giving away possessions  · Change in eating patterns- significant weight changes-  positive or negative  · Change in sleeping patterns- unable to sleep or sleeping all the time   · Unwillingness or inability to communicate  · Depression  · Unusual sadness, discouragement and loneliness  · Talk of wanting to die  · Neglect of personal appearance   · Rebelliousness- reckless behavior  · Withdrawal from people/activities they love  · Confusion- inability to concentrate     If you or a loved one observes any of these behaviors or has concerns about self-harm, here's what you can do:  · Talk about it- your feelings and reasons for harming yourself  · Remove any means that you might use to hurt yourself (examples: pills, rope, extension cords, firearm)  · Get professional help from the community (Mental Health, Substance Abuse, psychological counseling)  · Do not be alone:Call your Safe Contact- someone whom you trust who will be there for  you.  · Call your local CRISIS HOTLINE 518-8932 or 336-373-3876  · Call your local Childrens Mobile Crisis Response Team [place] (342) 259-0155 or www.Busuu  · Call the toll free National Suicide Prevention Hotlines   · National Suicide Prevention Lifeline 694-567-OYXP (7827)  · National Hope Line Network 800-SUICIDE (481-7610)    epression / Suicide Risk    As you are discharged from this [facility] facility, it is important to learn how to keep safe from harming yourself.    Recognize the warning signs:  · Abrupt changes in personality, positive or negative- including increase in energy   · Giving away possessions  · Change in eating patterns- significant weight changes-  positive or negative  · Change in sleeping patterns- unable to sleep or sleeping all the time   · Unwillingness or inability to communicate  · Depression  · Unusual sadness, discouragement and loneliness  · Talk of wanting to die  · Neglect of personal appearance   · Rebelliousness- reckless behavior  · Withdrawal from people/activities they love  · Confusion- inability to concentrate     If you or a loved one observes any of these behaviors or has concerns about self-harm, here's what you can do:  · Talk about it- your feelings and reasons for harming yourself  · Remove any means that you might use to hurt yourself (examples: pills, rope, extension cords, firearm)  · Get professional help from the community (Mental Health, Substance Abuse, psychological counseling)  · Do not be alone:Call your Safe Contact- someone whom you trust who will be there for you.  · Call your local CRISIS HOTLINE 820-8628 or 400-563-5384  · Call your local Children's Mobile Crisis Response Team [place] (466) 853-6479 or wwwUnidesk  · Call the toll free National Suicide Prevention Hotlines   · National Suicide Prevention Lifeline 669-319-KXFN (9756)  · National Hope Line Network 800-SUICIDE (663-0716)        Kidney Stones  Kidney  stones (urolithiasis) are rock-like masses that form inside of the kidneys. Kidneys are organs that make pee (urine). A kidney stone can cause very bad pain and can block the flow of pee. The stone usually leaves your body (passes) through your pee. You may need to have a doctor take out the stone.  Follow these instructions at home:  Eating and drinking  · Drink enough fluid to keep your pee clear or pale yellow. This will help you pass the stone.  · If told by your doctor, change the foods you eat (your diet). This may include:  ¨ Limiting how much salt (sodium) you eat.  ¨ Eating more fruits and vegetables.  ¨ Limiting how much meat, poultry, fish, and eggs you eat.  · Follow instructions from your doctor about eating or drinking restrictions.  General instructions  · Collect pee samples as told by your doctor. You may need to collect a pee sample:  ¨ 24 hours after a stone comes out.  ¨ 8-12 weeks after a stone comes out, and every 6-12 months after that.  · Strain your pee every time you pee (urinate), for as long as told. Use the strainer that your doctor recommends.  · Do not throw out the stone. Keep it so that it can be tested by your doctor.  · Take over-the-counter and prescription medicines only as told by your doctor.  · Keep all follow-up visits as told by your doctor. This is important. You may need follow-up tests.  Preventing kidney stones  To prevent another kidney stone:  · Drink enough fluid to keep your pee clear or pale yellow. This is the best way to prevent kidney stones.  · Eat healthy foods.  · Avoid certain foods as told by your doctor. You may be told to eat less protein.  · Stay at a healthy weight.  Contact a doctor if:  · You have pain that gets worse or does not get better with medicine.  Get help right away if:  · You have a fever or chills.  · You get very bad pain.  · You get new pain in your belly (abdomen).  · You pass out (faint).  · You cannot pee.  This information is not  intended to replace advice given to you by your health care provider. Make sure you discuss any questions you have with your health care provider.  Document Released: 06/05/2009 Document Revised: 09/05/2017 Document Reviewed: 09/05/2017  [employer] Interactive Patient Education © 2017 [employer]

## 2019-12-17 NOTE — PROGRESS NOTES
Patient is A&Ox4. Discharge instructions. Personal belongings in possession with patient. PIV and tele monitor removed. Copy of discharge instructions in patient chart, signed and reviewed. Patient verbalizes the understanding of the discharge instructions. Questions and concerns addressed prior to leaving the unit.  Transported via wheelchair by taxi. Patient discharged to home.

## 2019-12-19 ENCOUNTER — APPOINTMENT (OUTPATIENT)
Dept: RADIOLOGY | Facility: MEDICAL CENTER | Age: 64
DRG: 554 | End: 2019-12-19
Attending: STUDENT IN AN ORGANIZED HEALTH CARE EDUCATION/TRAINING PROGRAM
Payer: MEDICARE

## 2019-12-19 ENCOUNTER — APPOINTMENT (OUTPATIENT)
Dept: RADIOLOGY | Facility: MEDICAL CENTER | Age: 64
DRG: 554 | End: 2019-12-19
Attending: EMERGENCY MEDICINE
Payer: MEDICARE

## 2019-12-19 ENCOUNTER — HOSPITAL ENCOUNTER (INPATIENT)
Facility: MEDICAL CENTER | Age: 64
LOS: 2 days | DRG: 554 | End: 2019-12-22
Attending: EMERGENCY MEDICINE | Admitting: INTERNAL MEDICINE
Payer: MEDICARE

## 2019-12-19 DIAGNOSIS — I48.91 ATRIAL FIBRILLATION WITH RVR (HCC): ICD-10-CM

## 2019-12-19 DIAGNOSIS — M10.021 ACUTE IDIOPATHIC GOUT OF RIGHT ELBOW: ICD-10-CM

## 2019-12-19 DIAGNOSIS — L03.115 CELLULITIS OF RIGHT LOWER EXTREMITY: ICD-10-CM

## 2019-12-19 PROBLEM — M10.9 GOUT: Status: ACTIVE | Noted: 2019-12-19

## 2019-12-19 PROBLEM — R93.89 CHEST X-RAY ABNORMALITY: Status: ACTIVE | Noted: 2019-12-19

## 2019-12-19 PROBLEM — L03.90 CELLULITIS: Status: ACTIVE | Noted: 2019-12-19

## 2019-12-19 LAB
ANION GAP SERPL CALC-SCNC: 7 MMOL/L (ref 0–11.9)
APPEARANCE UR: CLEAR
BASOPHILS # BLD AUTO: 0.3 % (ref 0–1.8)
BASOPHILS # BLD: 0.03 K/UL (ref 0–0.12)
BILIRUB UR QL STRIP.AUTO: NEGATIVE
BUN SERPL-MCNC: 21 MG/DL (ref 8–22)
CALCIUM SERPL-MCNC: 8.6 MG/DL (ref 8.5–10.5)
CHLORIDE SERPL-SCNC: 108 MMOL/L (ref 96–112)
CO2 SERPL-SCNC: 24 MMOL/L (ref 20–33)
COLOR UR: YELLOW
CREAT SERPL-MCNC: 1.21 MG/DL (ref 0.5–1.4)
CRP SERPL HS-MCNC: 3.47 MG/DL (ref 0–0.75)
EKG IMPRESSION: NORMAL
EOSINOPHIL # BLD AUTO: 0.22 K/UL (ref 0–0.51)
EOSINOPHIL NFR BLD: 2.5 % (ref 0–6.9)
ERYTHROCYTE [DISTWIDTH] IN BLOOD BY AUTOMATED COUNT: 53.7 FL (ref 35.9–50)
EST. AVERAGE GLUCOSE BLD GHB EST-MCNC: 160 MG/DL
FERRITIN SERPL-MCNC: 67.9 NG/ML (ref 22–322)
GLUCOSE BLD-MCNC: 119 MG/DL (ref 65–99)
GLUCOSE BLD-MCNC: 124 MG/DL (ref 65–99)
GLUCOSE BLD-MCNC: 133 MG/DL (ref 65–99)
GLUCOSE SERPL-MCNC: 154 MG/DL (ref 65–99)
GLUCOSE UR STRIP.AUTO-MCNC: NEGATIVE MG/DL
HBA1C MFR BLD: 7.2 % (ref 0–5.6)
HCT VFR BLD AUTO: 38 % (ref 42–52)
HGB BLD-MCNC: 12.7 G/DL (ref 14–18)
IMM GRANULOCYTES # BLD AUTO: 0.1 K/UL (ref 0–0.11)
IMM GRANULOCYTES NFR BLD AUTO: 1.1 % (ref 0–0.9)
INR PPP: 3.02 (ref 0.87–1.13)
KETONES UR STRIP.AUTO-MCNC: NEGATIVE MG/DL
LACTATE BLD-SCNC: 1.5 MMOL/L (ref 0.5–2)
LEUKOCYTE ESTERASE UR QL STRIP.AUTO: NEGATIVE
LYMPHOCYTES # BLD AUTO: 0.84 K/UL (ref 1–4.8)
LYMPHOCYTES NFR BLD: 9.5 % (ref 22–41)
MAGNESIUM SERPL-MCNC: 2 MG/DL (ref 1.5–2.5)
MCH RBC QN AUTO: 32.9 PG (ref 27–33)
MCHC RBC AUTO-ENTMCNC: 33.4 G/DL (ref 33.7–35.3)
MCV RBC AUTO: 98.4 FL (ref 81.4–97.8)
MICRO URNS: NORMAL
MONOCYTES # BLD AUTO: 0.75 K/UL (ref 0–0.85)
MONOCYTES NFR BLD AUTO: 8.5 % (ref 0–13.4)
NEUTROPHILS # BLD AUTO: 6.89 K/UL (ref 1.82–7.42)
NEUTROPHILS NFR BLD: 78.1 % (ref 44–72)
NITRITE UR QL STRIP.AUTO: NEGATIVE
NRBC # BLD AUTO: 0 K/UL
NRBC BLD-RTO: 0 /100 WBC
NT-PROBNP SERPL IA-MCNC: 1097 PG/ML (ref 0–125)
PH UR STRIP.AUTO: 7 [PH] (ref 5–8)
PLATELET # BLD AUTO: 160 K/UL (ref 164–446)
PMV BLD AUTO: 9.9 FL (ref 9–12.9)
POTASSIUM SERPL-SCNC: 4.2 MMOL/L (ref 3.6–5.5)
PROT UR QL STRIP: NEGATIVE MG/DL
PROTHROMBIN TIME: 32.4 SEC (ref 12–14.6)
RBC # BLD AUTO: 3.86 M/UL (ref 4.7–6.1)
RBC UR QL AUTO: NEGATIVE
SODIUM SERPL-SCNC: 139 MMOL/L (ref 135–145)
SP GR UR STRIP.AUTO: 1.02
UROBILINOGEN UR STRIP.AUTO-MCNC: 1 MG/DL
WBC # BLD AUTO: 8.8 K/UL (ref 4.8–10.8)

## 2019-12-19 PROCEDURE — 73590 X-RAY EXAM OF LOWER LEG: CPT | Mod: RT

## 2019-12-19 PROCEDURE — 700105 HCHG RX REV CODE 258: Performed by: EMERGENCY MEDICINE

## 2019-12-19 PROCEDURE — 83605 ASSAY OF LACTIC ACID: CPT

## 2019-12-19 PROCEDURE — 302196 LINEN, HYPOALLERGENIC: Performed by: INTERNAL MEDICINE

## 2019-12-19 PROCEDURE — 80048 BASIC METABOLIC PNL TOTAL CA: CPT

## 2019-12-19 PROCEDURE — 71045 X-RAY EXAM CHEST 1 VIEW: CPT

## 2019-12-19 PROCEDURE — G0378 HOSPITAL OBSERVATION PER HR: HCPCS

## 2019-12-19 PROCEDURE — 700102 HCHG RX REV CODE 250 W/ 637 OVERRIDE(OP): Performed by: STUDENT IN AN ORGANIZED HEALTH CARE EDUCATION/TRAINING PROGRAM

## 2019-12-19 PROCEDURE — 700105 HCHG RX REV CODE 258: Performed by: STUDENT IN AN ORGANIZED HEALTH CARE EDUCATION/TRAINING PROGRAM

## 2019-12-19 PROCEDURE — 83880 ASSAY OF NATRIURETIC PEPTIDE: CPT

## 2019-12-19 PROCEDURE — 93971 EXTREMITY STUDY: CPT | Mod: RT

## 2019-12-19 PROCEDURE — 96366 THER/PROPH/DIAG IV INF ADDON: CPT

## 2019-12-19 PROCEDURE — 96375 TX/PRO/DX INJ NEW DRUG ADDON: CPT

## 2019-12-19 PROCEDURE — 85025 COMPLETE CBC W/AUTO DIFF WBC: CPT

## 2019-12-19 PROCEDURE — 96376 TX/PRO/DX INJ SAME DRUG ADON: CPT

## 2019-12-19 PROCEDURE — 81003 URINALYSIS AUTO W/O SCOPE: CPT

## 2019-12-19 PROCEDURE — 85610 PROTHROMBIN TIME: CPT

## 2019-12-19 PROCEDURE — 700111 HCHG RX REV CODE 636 W/ 250 OVERRIDE (IP): Performed by: STUDENT IN AN ORGANIZED HEALTH CARE EDUCATION/TRAINING PROGRAM

## 2019-12-19 PROCEDURE — 99220 PR INITIAL OBSERVATION CARE,LEVL III: CPT | Mod: GC | Performed by: INTERNAL MEDICINE

## 2019-12-19 PROCEDURE — 83735 ASSAY OF MAGNESIUM: CPT

## 2019-12-19 PROCEDURE — 93005 ELECTROCARDIOGRAM TRACING: CPT | Performed by: EMERGENCY MEDICINE

## 2019-12-19 PROCEDURE — 83036 HEMOGLOBIN GLYCOSYLATED A1C: CPT

## 2019-12-19 PROCEDURE — 96367 TX/PROPH/DG ADDL SEQ IV INF: CPT

## 2019-12-19 PROCEDURE — 71046 X-RAY EXAM CHEST 2 VIEWS: CPT

## 2019-12-19 PROCEDURE — 82962 GLUCOSE BLOOD TEST: CPT

## 2019-12-19 PROCEDURE — 86140 C-REACTIVE PROTEIN: CPT

## 2019-12-19 PROCEDURE — 94640 AIRWAY INHALATION TREATMENT: CPT

## 2019-12-19 PROCEDURE — 96365 THER/PROPH/DIAG IV INF INIT: CPT

## 2019-12-19 PROCEDURE — 700101 HCHG RX REV CODE 250: Performed by: STUDENT IN AN ORGANIZED HEALTH CARE EDUCATION/TRAINING PROGRAM

## 2019-12-19 PROCEDURE — 700111 HCHG RX REV CODE 636 W/ 250 OVERRIDE (IP): Performed by: EMERGENCY MEDICINE

## 2019-12-19 PROCEDURE — A9270 NON-COVERED ITEM OR SERVICE: HCPCS | Performed by: STUDENT IN AN ORGANIZED HEALTH CARE EDUCATION/TRAINING PROGRAM

## 2019-12-19 PROCEDURE — 93971 EXTREMITY STUDY: CPT | Mod: 26,RT | Performed by: INTERNAL MEDICINE

## 2019-12-19 PROCEDURE — 87040 BLOOD CULTURE FOR BACTERIA: CPT | Mod: 91

## 2019-12-19 PROCEDURE — 82728 ASSAY OF FERRITIN: CPT

## 2019-12-19 PROCEDURE — 99285 EMERGENCY DEPT VISIT HI MDM: CPT

## 2019-12-19 RX ORDER — SODIUM CHLORIDE, SODIUM LACTATE, POTASSIUM CHLORIDE, CALCIUM CHLORIDE 600; 310; 30; 20 MG/100ML; MG/100ML; MG/100ML; MG/100ML
1000 INJECTION, SOLUTION INTRAVENOUS CONTINUOUS
Status: DISCONTINUED | OUTPATIENT
Start: 2019-12-19 | End: 2019-12-22 | Stop reason: HOSPADM

## 2019-12-19 RX ORDER — ALLOPURINOL 100 MG/1
100 TABLET ORAL DAILY
Status: DISCONTINUED | OUTPATIENT
Start: 2019-12-19 | End: 2019-12-22 | Stop reason: HOSPADM

## 2019-12-19 RX ORDER — TRAMADOL HYDROCHLORIDE 50 MG/1
50 TABLET ORAL EVERY 6 HOURS PRN
Status: DISCONTINUED | OUTPATIENT
Start: 2019-12-19 | End: 2019-12-22 | Stop reason: HOSPADM

## 2019-12-19 RX ORDER — PROCHLORPERAZINE MALEATE 10 MG
10 TABLET ORAL EVERY 8 HOURS PRN
Status: DISCONTINUED | OUTPATIENT
Start: 2019-12-19 | End: 2019-12-22 | Stop reason: HOSPADM

## 2019-12-19 RX ORDER — WARFARIN SODIUM 2.5 MG/1
2.5 TABLET ORAL
Status: COMPLETED | OUTPATIENT
Start: 2019-12-19 | End: 2019-12-19

## 2019-12-19 RX ORDER — AMOXICILLIN 250 MG
2 CAPSULE ORAL 2 TIMES DAILY
Status: DISCONTINUED | OUTPATIENT
Start: 2019-12-19 | End: 2019-12-22 | Stop reason: HOSPADM

## 2019-12-19 RX ORDER — DOXYCYCLINE 100 MG/1
100 TABLET ORAL EVERY 12 HOURS
Status: DISCONTINUED | OUTPATIENT
Start: 2019-12-19 | End: 2019-12-20

## 2019-12-19 RX ORDER — METOPROLOL SUCCINATE 25 MG/1
25 TABLET, EXTENDED RELEASE ORAL ONCE
Status: COMPLETED | OUTPATIENT
Start: 2019-12-19 | End: 2019-12-19

## 2019-12-19 RX ORDER — AMOXICILLIN AND CLAVULANATE POTASSIUM 875; 125 MG/1; MG/1
1 TABLET, FILM COATED ORAL EVERY 12 HOURS
Status: DISCONTINUED | OUTPATIENT
Start: 2019-12-19 | End: 2019-12-19

## 2019-12-19 RX ORDER — BISACODYL 10 MG
10 SUPPOSITORY, RECTAL RECTAL
Status: DISCONTINUED | OUTPATIENT
Start: 2019-12-19 | End: 2019-12-22 | Stop reason: HOSPADM

## 2019-12-19 RX ORDER — DILTIAZEM HYDROCHLORIDE 5 MG/ML
10 INJECTION INTRAVENOUS ONCE
Status: COMPLETED | OUTPATIENT
Start: 2019-12-19 | End: 2019-12-19

## 2019-12-19 RX ORDER — LEVALBUTEROL INHALATION SOLUTION 0.63 MG/3ML
0.63 SOLUTION RESPIRATORY (INHALATION)
Status: DISCONTINUED | OUTPATIENT
Start: 2019-12-19 | End: 2019-12-22 | Stop reason: HOSPADM

## 2019-12-19 RX ORDER — MORPHINE SULFATE 4 MG/ML
4 INJECTION, SOLUTION INTRAMUSCULAR; INTRAVENOUS ONCE
Status: COMPLETED | OUTPATIENT
Start: 2019-12-19 | End: 2019-12-19

## 2019-12-19 RX ORDER — HYDROCODONE BITARTRATE AND ACETAMINOPHEN 5; 325 MG/1; MG/1
1-2 TABLET ORAL EVERY 6 HOURS PRN
Status: DISCONTINUED | OUTPATIENT
Start: 2019-12-19 | End: 2019-12-22 | Stop reason: HOSPADM

## 2019-12-19 RX ORDER — OXYCODONE HYDROCHLORIDE 5 MG/1
5 TABLET ORAL ONCE
Status: COMPLETED | OUTPATIENT
Start: 2019-12-19 | End: 2019-12-19

## 2019-12-19 RX ORDER — CEFAZOLIN SODIUM 2 G/100ML
2 INJECTION, SOLUTION INTRAVENOUS EVERY 8 HOURS
Status: DISCONTINUED | OUTPATIENT
Start: 2019-12-19 | End: 2019-12-19

## 2019-12-19 RX ORDER — POLYETHYLENE GLYCOL 3350 17 G/17G
1 POWDER, FOR SOLUTION ORAL
Status: DISCONTINUED | OUTPATIENT
Start: 2019-12-19 | End: 2019-12-22 | Stop reason: HOSPADM

## 2019-12-19 RX ORDER — MEDICAL SUPPLY, MISCELLANEOUS
EACH MISCELLANEOUS
Qty: 1 EACH | Refills: 0 | Status: SHIPPED | OUTPATIENT
Start: 2019-12-19 | End: 2020-02-28

## 2019-12-19 RX ORDER — TAMSULOSIN HYDROCHLORIDE 0.4 MG/1
0.4 CAPSULE ORAL
Status: DISCONTINUED | OUTPATIENT
Start: 2019-12-19 | End: 2019-12-22 | Stop reason: HOSPADM

## 2019-12-19 RX ORDER — LABETALOL HYDROCHLORIDE 5 MG/ML
10 INJECTION, SOLUTION INTRAVENOUS EVERY 4 HOURS PRN
Status: DISCONTINUED | OUTPATIENT
Start: 2019-12-19 | End: 2019-12-22 | Stop reason: HOSPADM

## 2019-12-19 RX ORDER — ACETAMINOPHEN 325 MG/1
650 TABLET ORAL EVERY 6 HOURS PRN
Status: DISCONTINUED | OUTPATIENT
Start: 2019-12-19 | End: 2019-12-22 | Stop reason: HOSPADM

## 2019-12-19 RX ORDER — LEVALBUTEROL INHALATION SOLUTION 1.25 MG/3ML
1.25 SOLUTION RESPIRATORY (INHALATION)
Status: DISCONTINUED | OUTPATIENT
Start: 2019-12-19 | End: 2019-12-19

## 2019-12-19 RX ORDER — LEVALBUTEROL INHALATION SOLUTION 1.25 MG/3ML
1.25 SOLUTION RESPIRATORY (INHALATION)
Status: DISCONTINUED | OUTPATIENT
Start: 2019-12-19 | End: 2019-12-20

## 2019-12-19 RX ORDER — PROCHLORPERAZINE MALEATE 10 MG
10 TABLET ORAL EVERY 6 HOURS PRN
Status: DISCONTINUED | OUTPATIENT
Start: 2019-12-19 | End: 2019-12-19

## 2019-12-19 RX ADMIN — DILTIAZEM HYDROCHLORIDE 10 MG: 5 INJECTION INTRAVENOUS at 04:47

## 2019-12-19 RX ADMIN — SODIUM CHLORIDE, POTASSIUM CHLORIDE, SODIUM LACTATE AND CALCIUM CHLORIDE 1000 ML: 600; 310; 30; 20 INJECTION, SOLUTION INTRAVENOUS at 23:58

## 2019-12-19 RX ADMIN — METOPROLOL TARTRATE 25 MG: 25 TABLET, FILM COATED ORAL at 12:31

## 2019-12-19 RX ADMIN — METOPROLOL TARTRATE 50 MG: 25 TABLET, FILM COATED ORAL at 18:24

## 2019-12-19 RX ADMIN — AMPICILLIN SODIUM AND SULBACTAM SODIUM 3 G: 2; 1 INJECTION, POWDER, FOR SOLUTION INTRAMUSCULAR; INTRAVENOUS at 18:25

## 2019-12-19 RX ADMIN — DOXYCYCLINE 100 MG: 100 TABLET, FILM COATED ORAL at 11:18

## 2019-12-19 RX ADMIN — CEFTRIAXONE SODIUM 1 G: 1 INJECTION, POWDER, FOR SOLUTION INTRAMUSCULAR; INTRAVENOUS at 05:42

## 2019-12-19 RX ADMIN — DOXYCYCLINE 100 MG: 100 TABLET, FILM COATED ORAL at 18:24

## 2019-12-19 RX ADMIN — METOPROLOL TARTRATE 25 MG: 25 TABLET, FILM COATED ORAL at 09:04

## 2019-12-19 RX ADMIN — VANCOMYCIN HYDROCHLORIDE 3000 MG: 500 INJECTION, POWDER, LYOPHILIZED, FOR SOLUTION INTRAVENOUS at 05:57

## 2019-12-19 RX ADMIN — SODIUM CHLORIDE, POTASSIUM CHLORIDE, SODIUM LACTATE AND CALCIUM CHLORIDE 1000 ML: 600; 310; 30; 20 INJECTION, SOLUTION INTRAVENOUS at 05:51

## 2019-12-19 RX ADMIN — LEVALBUTEROL HYDROCHLORIDE 0.63 MG: 0.63 SOLUTION RESPIRATORY (INHALATION) at 14:57

## 2019-12-19 RX ADMIN — AMPICILLIN SODIUM AND SULBACTAM SODIUM 3 G: 2; 1 INJECTION, POWDER, FOR SOLUTION INTRAMUSCULAR; INTRAVENOUS at 23:57

## 2019-12-19 RX ADMIN — ACETAMINOPHEN 650 MG: 325 TABLET, FILM COATED ORAL at 09:04

## 2019-12-19 RX ADMIN — TAMSULOSIN HYDROCHLORIDE 0.4 MG: 0.4 CAPSULE ORAL at 11:32

## 2019-12-19 RX ADMIN — SENNOSIDES AND DOCUSATE SODIUM 2 TABLET: 8.6; 5 TABLET ORAL at 07:55

## 2019-12-19 RX ADMIN — WARFARIN SODIUM 2.5 MG: 2.5 TABLET ORAL at 18:24

## 2019-12-19 RX ADMIN — METOPROLOL SUCCINATE 25 MG: 25 TABLET, EXTENDED RELEASE ORAL at 11:19

## 2019-12-19 RX ADMIN — MORPHINE SULFATE 4 MG: 4 INJECTION INTRAVENOUS at 04:06

## 2019-12-19 RX ADMIN — MORPHINE SULFATE 4 MG: 4 INJECTION INTRAVENOUS at 04:47

## 2019-12-19 RX ADMIN — HYDROCODONE BITARTRATE AND ACETAMINOPHEN 2 TABLET: 5; 325 TABLET ORAL at 18:30

## 2019-12-19 RX ADMIN — AMPICILLIN SODIUM AND SULBACTAM SODIUM 3 G: 2; 1 INJECTION, POWDER, FOR SOLUTION INTRAMUSCULAR; INTRAVENOUS at 11:20

## 2019-12-19 RX ADMIN — ALLOPURINOL 100 MG: 100 TABLET ORAL at 09:04

## 2019-12-19 RX ADMIN — HYDROCODONE BITARTRATE AND ACETAMINOPHEN 2 TABLET: 5; 325 TABLET ORAL at 12:30

## 2019-12-19 RX ADMIN — OXYCODONE HYDROCHLORIDE 5 MG: 5 TABLET ORAL at 11:18

## 2019-12-19 ASSESSMENT — LIFESTYLE VARIABLES
HOW MANY TIMES IN THE PAST YEAR HAVE YOU HAD 5 OR MORE DRINKS IN A DAY: 0
CONSUMPTION TOTAL: NEGATIVE
EVER FELT BAD OR GUILTY ABOUT YOUR DRINKING: NO
ALCOHOL_USE: NO
EVER_SMOKED: YES
TOTAL SCORE: 0
ON A TYPICAL DAY WHEN YOU DRINK ALCOHOL HOW MANY DRINKS DO YOU HAVE: 0
HAVE PEOPLE ANNOYED YOU BY CRITICIZING YOUR DRINKING: NO
EVER_SMOKED: YES
TOTAL SCORE: 0
HAVE YOU EVER FELT YOU SHOULD CUT DOWN ON YOUR DRINKING: NO
TOTAL SCORE: 0
AVERAGE NUMBER OF DAYS PER WEEK YOU HAVE A DRINK CONTAINING ALCOHOL: 0
SUBSTANCE_ABUSE: 0
EVER HAD A DRINK FIRST THING IN THE MORNING TO STEADY YOUR NERVES TO GET RID OF A HANGOVER: NO
DOES PATIENT WANT TO STOP DRINKING: NO

## 2019-12-19 ASSESSMENT — ENCOUNTER SYMPTOMS
CHILLS: 0
CONSTIPATION: 0
PALPITATIONS: 1
PND: 0
MYALGIAS: 0
ABDOMINAL PAIN: 0
VOMITING: 0
BLURRED VISION: 0
BRUISES/BLEEDS EASILY: 1
HEARTBURN: 0
BACK PAIN: 0
COUGH: 0
DIARRHEA: 0
DIZZINESS: 0
FEVER: 0
FLANK PAIN: 0
WHEEZING: 1
CLAUDICATION: 0
NAUSEA: 0
HEMOPTYSIS: 0
PALPITATIONS: 0
DOUBLE VISION: 0
HEADACHES: 0
ORTHOPNEA: 0
SHORTNESS OF BREATH: 0
DEPRESSION: 0
NECK PAIN: 0

## 2019-12-19 ASSESSMENT — CHA2DS2 SCORE
CHF OR LEFT VENTRICULAR DYSFUNCTION: NO
DIABETES: YES
VASCULAR DISEASE: NO
AGE 75 OR GREATER: NO
HYPERTENSION: YES
CHA2DS2 VASC SCORE: 3
AGE 65 TO 74: YES
PRIOR STROKE OR TIA OR THROMBOEMBOLISM: NO
SEX: MALE

## 2019-12-19 ASSESSMENT — COPD QUESTIONNAIRES
COPD SCREENING SCORE: 6
HAVE YOU SMOKED AT LEAST 100 CIGARETTES IN YOUR ENTIRE LIFE: YES
DO YOU EVER COUGH UP ANY MUCUS OR PHLEGM?: YES, A FEW DAYS A WEEK OR MONTH
DURING THE PAST 4 WEEKS HOW MUCH DID YOU FEEL SHORT OF BREATH: SOME OF THE TIME
IN THE PAST 12 MONTHS DO YOU DO LESS THAN YOU USED TO BECAUSE OF YOUR BREATHING PROBLEMS: DISAGREE/UNSURE
DURING THE PAST 4 WEEKS HOW MUCH DID YOU FEEL SHORT OF BREATH: SOME OF THE TIME
DO YOU EVER COUGH UP ANY MUCUS OR PHLEGM?: YES, A FEW DAYS A WEEK OR MONTH
COPD SCREENING SCORE: 6
HAVE YOU SMOKED AT LEAST 100 CIGARETTES IN YOUR ENTIRE LIFE: YES

## 2019-12-19 ASSESSMENT — PATIENT HEALTH QUESTIONNAIRE - PHQ9
2. FEELING DOWN, DEPRESSED, IRRITABLE, OR HOPELESS: NOT AT ALL
SUM OF ALL RESPONSES TO PHQ9 QUESTIONS 1 AND 2: 0
1. LITTLE INTEREST OR PLEASURE IN DOING THINGS: NOT AT ALL

## 2019-12-19 NOTE — H&P
Internal Medicine Admitting History and Physical    Note Author: Yusuf Nassar M.D.       Name Morales Olivier     1955   Age/Sex 64 y.o. male   MRN [id number]   Code Status Full Code     After 5PM or if no immediate response to page, please call for cross-coverage  Attending/Team: Dr. Shah/Husam See Patient List for primary contact information  Call [phone] to page    1st Call - Day Intern (R1):   Dr. Martin 2nd Call - Day Sr. Resident (R2/R3):   Dr. Stevenson       Chief Complaint:  R. Lower leg pain    HPI:  Patient is a 54-year-old male with history significant for MVR bovine valve, DVT, atrial fibrillation, hypertension, sleep apnea without CPAP, nephrolithiasis, chronic anticoagulation with INR goal of two-point 2.5-3 came to the ED due to right lower leg pain and redness that is been getting progressively worse for the last week patient was discharged on  after being admitted for hematuria where he was found to have 3.8 mm right kidney stone that passed    In emergency department, patient went into A. fib with RVR treated with 10 mg of diltiazem.  Heart rate in 100s at this time with blood pressure at 130s over 90s.  Satting in 90s on room air      Review of Systems   Constitutional: Negative for chills and fever.   HENT: Negative for hearing loss and tinnitus.    Eyes: Negative for blurred vision and double vision.   Respiratory: Negative for cough and hemoptysis.    Cardiovascular: Positive for palpitations. Negative for chest pain, orthopnea, claudication, leg swelling and PND.   Gastrointestinal: Negative for heartburn and nausea.   Genitourinary: Negative for dysuria, flank pain, frequency, hematuria and urgency.   Musculoskeletal: Positive for joint pain (Right lower leg). Negative for back pain, myalgias and neck pain.   Neurological: Negative for dizziness and headaches.   Endo/Heme/Allergies: Bruises/bleeds easily.   Psychiatric/Behavioral: Negative for depression, substance  abuse and suicidal ideas.             Past Medical History:   Past Medical History:   Diagnosis Date   • Atrial fibrillation (HCC)    • Bronchitis    • CAD (coronary artery disease)    • Gout    • Heart valve disease     mitral valve replacement (bovine)   • Hypertension    • Pacemaker    • Personal history of venous thrombosis and embolism 2009    DVT right leg   • PVC (premature ventricular contraction) 4/13/2019   • Renal disorder     kidney stones   • Type II or unspecified type diabetes mellitus without mention of complication, not stated as uncontrolled     DM type II- diet controlled       Past Surgical History:  Past Surgical History:   Procedure Laterality Date   • MITRAL VALVE REPLACE  3/8/2017    Procedure: MITRAL VALVE REPLACE-REDO;  Surgeon: Cornelia Wright M.D.;  Location: [facility];  Service:    • KD  3/8/2017    Procedure: KD;  Surgeon: Cornelia Wright M.D.;  Location: [facility];  Service:    • IRRIGATION & DEBRIDEMENT GENERAL  7/20/2009    Performed by MICHEL URBINA at [facility]   • WIDE EXCISION  7/20/2009    Performed by MICHEL URBINA at [facility]   • ANGIOGRAM  7/4/2009    Performed by MICHEL URBINA at [facility]   • CATH REMOVAL  7/4/2009    Performed by MICHEL URBINA at [facility]   • THROMBECTOMY  7/4/2009    Performed by MICHEL URBINA at [facility]   • EMBOLECTOMY  7/4/2009    Performed by MICHEL URBINA at [facility]   • ANGIOGRAM  7/3/2009    Performed by MORGAN WARD at [facility]   • MITRAL VALVE REPLACE  3/14/08    Performed by CORNELIA WRIGHT at [facility]   • MAZE PROCEDURE  3/14/08    Performed by CORNELIA WRIGHT at [facility]   • OTHER CARDIAC SURGERY  2008    mitral valve replacement   • OTHER ABDOMINAL SURGERY      imbulica repair        Current Outpatient Medications:  Home Medications    **Home  medications have not yet been reviewed for this encounter**         Medication Allergy/Sensitivities:  No Known Allergies      Family History:  Family History   Problem Relation Age of Onset   • Heart Disease Neg Hx        Social History:  Social History     Socioeconomic History   • Marital status: Single     Spouse name: Not on file   • Number of children: Not on file   • Years of education: Not on file   • Highest education level: Not on file   Occupational History   • Not on file   Social Needs   • Financial resource strain: Not on file   • Food insecurity:     Worry: Not on file     Inability: Not on file   • Transportation needs:     Medical: Not on file     Non-medical: Not on file   Tobacco Use   • Smoking status: Former Smoker     Packs/day: 0.00     Last attempt to quit: 1981     Years since quittin.9   • Smokeless tobacco: Never Used   Substance and Sexual Activity   • Alcohol use: No   • Drug use: No   • Sexual activity: Not on file   Lifestyle   • Physical activity:     Days per week: Not on file     Minutes per session: Not on file   • Stress: Not on file   Relationships   • Social connections:     Talks on phone: Not on file     Gets together: Not on file     Attends [employer] service: Not on file     Active member of club or organization: Not on file     Attends meetings of clubs or organizations: Not on file     Relationship status: Not on file   • Intimate partner violence:     Fear of current or ex partner: Not on file     Emotionally abused: Not on file     Physically abused: Not on file     Forced sexual activity: Not on file   Other Topics Concern   • Not on file   Social History Narrative   • Not on file     Living situation: [facility]  PCP : Pcp Pt States None      Physical Exam     Vitals:    19 0333 19 0334 19 0417 19 0431   BP:  134/81 135/95 128/97   Pulse:  84 (!) 122 (!) 120   Resp:  18     Temp:  36.8 °C (98.3 °F)     SpO2:  99% 96% 94%   Weight: 119.7  "kg (264 lb)      Height: 1.778 m (5' 10\")        Body mass index is 37.88 kg/m².  /97   Pulse (!) 120   Temp 36.8 °C (98.3 °F)   Resp 18   Ht 1.778 m (5' 10\")   Wt 119.7 kg (264 lb)   SpO2 94%   BMI 37.88 kg/m²   O2 therapy: Pulse Oximetry: 94 %, O2 Delivery: None (Room Air)    Physical Exam   Constitutional: He is oriented to person, place, and time and well-developed, well-nourished, and in no distress.   Dry mucus membranes   HENT:   Head: Normocephalic and atraumatic.   Eyes: Pupils are equal, round, and reactive to light. EOM are normal.   Neck: Normal range of motion.   Cardiovascular: Normal rate and intact distal pulses. Exam reveals no friction rub.   No murmur heard.  Irregularly irregular rythm   Pulmonary/Chest: Breath sounds normal. No respiratory distress.   Abdominal: Soft. Bowel sounds are normal. He exhibits no distension.   Musculoskeletal:         General: Edema (Trace bilateral lower legs) present. No deformity.   Neurological: He is alert and oriented to person, place, and time. GCS score is 15.   Skin: Skin is warm and dry. There is erythema (R. Lower leg).   Psychiatric: Memory, affect and judgment normal.             Data Review       Old Records Request:   Completed  Current Records review/summary: Completed    Lab Data Review:  Recent Results (from the past 24 hour(s))   CBC WITH DIFFERENTIAL    Collection Time: 12/19/19  3:33 AM   Result Value Ref Range    WBC 8.8 4.8 - 10.8 K/uL    RBC 3.86 (L) 4.70 - 6.10 M/uL    Hemoglobin 12.7 (L) 14.0 - 18.0 g/dL    Hematocrit 38.0 (L) 42.0 - 52.0 %    MCV 98.4 (H) 81.4 - 97.8 fL    MCH 32.9 27.0 - 33.0 pg    MCHC 33.4 (L) 33.7 - 35.3 g/dL    RDW 53.7 (H) 35.9 - 50.0 fL    Platelet Count 160 (L) 164 - 446 K/uL    MPV 9.9 9.0 - 12.9 fL    Neutrophils-Polys 78.10 (H) 44.00 - 72.00 %    Lymphocytes 9.50 (L) 22.00 - 41.00 %    Monocytes 8.50 0.00 - 13.40 %    Eosinophils 2.50 0.00 - 6.90 %    Basophils 0.30 0.00 - 1.80 %    Immature " Granulocytes 1.10 (H) 0.00 - 0.90 %    Nucleated RBC 0.00 /100 WBC    Neutrophils (Absolute) 6.89 1.82 - 7.42 K/uL    Lymphs (Absolute) 0.84 (L) 1.00 - 4.80 K/uL    Monos (Absolute) 0.75 0.00 - 0.85 K/uL    Eos (Absolute) 0.22 0.00 - 0.51 K/uL    Baso (Absolute) 0.03 0.00 - 0.12 K/uL    Immature Granulocytes (abs) 0.10 0.00 - 0.11 K/uL    NRBC (Absolute) 0.00 K/uL   BASIC METABOLIC PANEL    Collection Time: 19  3:33 AM   Result Value Ref Range    Sodium 139 135 - 145 mmol/L    Potassium 4.2 3.6 - 5.5 mmol/L    Chloride 108 96 - 112 mmol/L    Co2 24 20 - 33 mmol/L    Glucose 154 (H) 65 - 99 mg/dL    Bun 21 8 - 22 mg/dL    Creatinine 1.21 0.50 - 1.40 mg/dL    Calcium 8.6 8.5 - 10.5 mg/dL    Anion Gap 7.0 0.0 - 11.9   PROTHROMBIN TIME (INR)    Collection Time: 19  3:33 AM   Result Value Ref Range    PT 32.4 (H) 12.0 - 14.6 sec    INR 3.02 (H) 0.87 - 1.13   ESTIMATED GFR    Collection Time: 19  3:33 AM   Result Value Ref Range    GFR If African American >60 >60 mL/min/1.73 m 2    GFR If Non African American >60 >60 mL/min/1.73 m 2   EKG (NOW)    Collection Time: 19  4:35 AM   Result Value Ref Range    Report       [facility] Emergency Dept.    Test Date:  2019  Pt Name:    MORALES MCINTOSH                 Department: ER  MRN:        [id number]                      Room:       RD 05  Gender:     Male                         Technician: [id number]  :        1955                   Requested By:MORALES CHERY  Order #:    [id number]                    Reading MD: Morales Chery MD    Measurements  Intervals                                Axis  Rate:       127                          P:  DE:                                      QRS:        77  QRSD:       86                           T:          -74  QT:         348  QTc:        507    Interpretive Statements  Afib/flut and V-paced complexes  No further rhythm analysis attempted due to paced rhythm  Nonspecific repol  abnormality, diffuse leads  Prolonged QT interval  Compared to ECG 10/22/2019 10:14:03  No significant changes  Electronically Signed On 12- 5:08:38 PST by Morales Chery MD         Imaging/Procedures Review:    Independant Imaging Review: Completed  US-EXTREMITY VENOUS LOWER UNILAT RIGHT   Final Result      DX-CHEST-LIMITED (1 VIEW)    (Results Pending)        EKG:   EKG Independant Review: Completed  QTc:507, HR: 127, Normal Sinus Rhythm, no ST/T changes     Records reviewed and summarized in current documentation :  Yes  [employer] teaching service handout given to patient:  No             Assessment/Plan     * Cellulitis  Assessment & Plan  Patient is 63 YO male presents to day to worsening right lower extremity redness, tenderness  1/4 SIRS    Plan  Fluids  Ceftriaxone  He already received vancomycin in the ED.  Day team to consider continuation  Chest x-ray ordered        Deep vein thrombosis (HCC) [I82.409]- (present on admission)  Assessment & Plan  History of DVT without PE per patient  Ultrasound of bilateral lower extremities did not show any acute DVTs  Patient is already on anticoagulation with INR of 3 in ED  Warfarin per pharmacy with goal INR mentioned above    A-fib (HCC)- (present on admission)  Assessment & Plan  History of A. fib  On anticoagulation for MVR  INR 3 today  On metoprolol 25 twice daily at home  Patient went into A. fib with RVR heart rate into the 130s in ED.  Received 10 mg of diltiazem  Heart rate in 100s    Plan  Continue home metoprolol  Pharmacy for goal INR of 2-3.  Patient does report his INR supposed to be 2.5-3.5.  Day team to clarify as patient has bovine valve.  Reportedly, at some point he did have porcine valve which was replaced with bovine  Recent echo 2 months ago showed EF of 55%.  Patient does have elevated RV pressure at 50      Gross hematuria- (present on admission)  Assessment & Plan  Discharged on 12/16/2019 for hematuria secondary to nephrolithiasis right  kidney, measuring at 8 mm    Resolved  Patient denies any hematuria, flank pain, nausea, vomiting      COPD exacerbation (HCC)- (present on admission)  Assessment & Plan  Mild per patient    Held home albuterol due to tachycardia      Anticipated Hospital stay: Observation admit    Quality Measures  Quality-Core Measures   Reviewed items::  Labs reviewed, Medications reviewed, Radiology images reviewed and EKG reviewed  Fragoso catheter::  No Fragoso  DVT prophylaxis pharmacological::  Warfarin (Coumadin)    PCP: Pcp Pt States None

## 2019-12-19 NOTE — CARE PLAN
Problem: Bronchoconstriction:  Goal: Improve in air movement and diminished wheezing  Note:   Xoponex Q4 per COPD exac protocol

## 2019-12-19 NOTE — ED TRIAGE NOTES
"Chief Complaint   Patient presents with   • Leg Swelling     right     Pt BIB EMS for right leg pain and swelling. Pt has Hx of DVT and a-fib on coumadin currently. Pt has observable right leg swelling and mild redness and warm to touch. CMS intact.     /81   Pulse 84   Temp 36.8 °C (98.3 °F)   Resp 18   Ht 1.778 m (5' 10\")   Wt 119.7 kg (264 lb)   SpO2 99%   BMI 37.88 kg/m²     "

## 2019-12-19 NOTE — ED NOTES
While giving bedside report to OSWALD Steinberg, noticed pt had red welts/hives to left forearm. Vancomycin was stopped.

## 2019-12-19 NOTE — ASSESSMENT & PLAN NOTE
Mr. Olivier presented with symptoms consistent with cellulitis affecting the right leg. A CT scan revealed findings suggestive of osteomyelitis of the right second and fourth toes. However, correlation of the patient's clinical history of physical examination findings were used to establish a diagnosis of gout of the right knee with overlying cellulitis.    - Right knee x-ray normal  - Continue home allopurinol  - Patient adamantly refuses intraarticular glucocorticoids and has impaired renal function. We will therefore start prednisone 40 mg x 5 days.   - Norco and tramadol for pain  - Discharge with colchicine as needed for gout flares

## 2019-12-19 NOTE — PROGRESS NOTES
Pt arrived to unit via gurney at 0850. Pt oriented to room, unit, and plan of care. Tele-monitor placed; Afib 120; MD aware; Pt c/o pain to right leg; Medicated per MAR; Pt c/o sheets itching; hypoallergenic sheets ordered; All questions answered at this time. Call light within reach; fall precautions in place.

## 2019-12-19 NOTE — ASSESSMENT & PLAN NOTE
History of DVT without PE per patient  Ultrasound of bilateral lower extremities did not show any acute DVTs  Patient is already on anticoagulation with INR of 3 in ED  Warfarin per pharmacy with goal INR mentioned above

## 2019-12-19 NOTE — ED NOTES
Pt aware of plan of care- waiting on admission bed. Currently watching TV, call light within reach.

## 2019-12-19 NOTE — ASSESSMENT & PLAN NOTE
Discharged on 12/16/2019 for hematuria secondary to nephrolithiasis right kidney, measuring at 8 mm    Resolved  Patient denies any hematuria, flank pain, nausea, vomiting

## 2019-12-19 NOTE — PROGRESS NOTES
Inpatient Anticoagulation Service Note    Date: 12/19/2019    Reason for Anticoagulation: Deep Vein Thrombosis, Atrial Fibrillation, Bioprosthetic Valve Replacement   Target INR: 2.0 to 3.0  [id number] VASc Score: 3  HAS-BLED Score: 1   Hemoglobin Value: (!) 12.7  Hematocrit Value: (!) 38  Lab Platelet Value: (!) 160    INR from last 7 days     Date/Time INR Value    12/19/19 0333  (!) 3.02        Dose from last 7 days     Date/Time Dose (mg)    12/19/19 0946  2.5        Significant Interactions: Antibiotics  Bridge Therapy: No     Reversal Agent Administered: Not Applicable  Comments: Patient has a documented history of DVT, AFib, and bioprosthetic valve replacement. Patient originally had goal INR of 2.5 to 3.5 but confirmed with [facility] that patient patient has a bioprosthetic valve and goal INR range is 2 to 3. Patient has relatively stable H and H and no overt bleeding risk noted.  Patient has slightly supratherapeutic INR at 3.02, however patient is also on antibiotic therapy including doxycycline which may increase INR. Due to this patient will receive reduced dose from her normal 5 mg dosing today to 2.5 mg. Depending on INR tomorrow will determine if patient will go back on normal outpatient dosing of 5 mg everyday except for 7 mg Wednesdays.     Plan:  2.5 mg  Education Material Provided?: No  Pharmacist suggested discharge dosing: GABBY Hidalgo PharmD

## 2019-12-19 NOTE — ASSESSMENT & PLAN NOTE
- Mild per patient  - Lungs clear bilaterally to auscultation on physical examination  - Avoiding albuterol due to tachycardia  - Patient refuses levalbuterol  - Continue to monitor

## 2019-12-19 NOTE — ASSESSMENT & PLAN NOTE
History of A. fib  On anticoagulation for MVR  INR 3 today  On metoprolol 25 twice daily at home  Patient went into A. fib with RVR heart rate into the 130s in ED.  Received 10 mg of diltiazem  Heart rate in 100s    Plan  Improved tachycardia with aggressive pain control  Start Toprol XL 50 mg daily  Metoprolol 5 mg IV push for HR > 120 BPM  Pharmacy for goal INR of 2-3.  Patient does report his INR supposed to be 2.5-3.5.  Day team to clarify as patient has bovine valve.  Reportedly, at some point he did have porcine valve which was replaced with bovine  Recent echo 2 months ago showed EF of 55%.  Patient does have elevated RV pressure at 50

## 2019-12-19 NOTE — ED NOTES
Med Rec completed per patient   Allergies reviewed  No ORAL antibiotics in last 14 days    Patient takes Warfarin  7 mg on Wednesday   5 mg all other days

## 2019-12-19 NOTE — CARE PLAN
Problem: Knowledge Deficit  Goal: Knowledge of disease process/condition, treatment plan, diagnostic tests, and medications will improve  Outcome: PROGRESSING AS EXPECTED   Pt educated regarding plan of care and medications. All questions answered.

## 2019-12-19 NOTE — ASSESSMENT & PLAN NOTE
Chest x-ray showed a small infrahilar infiltrate. Patient denies fevers or dyspnea.    - Two-view CXR showed mild interstitial opacities consistent with atelectasis/mild edema  - Continue to monitor

## 2019-12-19 NOTE — PROGRESS NOTES
2 RN skin check: RLE warm to touch, red, with trace edema, scratches. Buttocks pink but blanching. Scratches to BUE. Skin otherwise intact and WNL.

## 2019-12-19 NOTE — PROGRESS NOTES
Initial assessment completed. Mild WOB noted, expiratory wheezes auscultated throughout but pt denies wheezes, respiratory distress. Dry cough noted. Pt c/o inability to stand dt RLE and R knee pain, transferred via sideboard and condom cath placed. POC reviewed with pt: IV abx, HR control, pain control. Fall precautions in place. Lab contacted for add-on.

## 2019-12-19 NOTE — DISCHARGE PLANNING
Care Transition Team Assessment    Spoke with patient at bedside.      Spoke with patient at bedside. Lives alone in [place]. Well known to [facility]. Has no ride home @ present and per last admission does not have MTM benefits. May need Bus pass.    Information Source  Orientation : Oriented x 4  Information Given By: Patient  Informant's Name: Morales Olivier    Readmission Evaluation  Is this a readmission?: No    Interdisciplinary Discharge Planning  Does Admitting Nurse Feel This Could be a Complex Discharge?: No  Primary Care Physician: None  Lives with - Patient's Self Care Capacity: Alone and Able to Care For Self  Patient or legal guardian wants to designate a caregiver (see row info): No  Support Systems: Friends / Neighbors  Housing / Facility: 1 Story Apartment / Condo  Do You Take your Prescribed Medications Regularly: Yes  Able to Return to Previous ADL's: Yes  Mobility Issues: Yes  Prior Services: Home-Independent  Patient Expects to be Discharged to:: Home  Assistance Needed: Unknown at this Time  Durable Medical Equipment: Other - Specify(Cane)    Discharge Preparedness  What are your discharge supports?: Other (comment)(Friend)  Prior Functional Level: Ambulatory    Functional Assesment  Prior Functional Level: Ambulatory    Finances  Prescription Coverage: Yes    Discharge Risks or Barriers  Discharge risks or barriers?: No PCP  Patient risk factors: No PCP    Anticipated Discharge Information  Anticipated discharge disposition: Home  Discharge Address: [address]

## 2019-12-19 NOTE — PROGRESS NOTES
Internal Medicine Interval Note  Note Author: Scottie Bansal M.D.     Name Morales Olivier     1955   Age/Sex 64 y.o. male   MRN [id number]   Code Status Full Code     After 5PM or if no immediate response to page, please call for cross-coverage  Attending/Team: Dr. Shah/Wes See Patient List for primary contact information  Call [phone] to page    1st Call - Day Intern (R1):   Scottie Bansal 2nd Call - Day Sr. Resident (R2/R3):   Mason Stevenson         Reason for interval visit  (Principal Problem)   Cellulitis    Interval Problem Daily Status Update  (24 hours, problem oriented, brief subjective history, new lab/imaging data pertinent to that problem)     Mr. Morales Olivier is a 64-year-old man with  atrial fibrillation (rate-controlled with metoprolol and on warfarin anticoagulation), sick sinus syndrome status-post pacemaker, hypertension, diabetes mellitus, mitral valve regurgitation status-post bovine valve replacement, and a history of DVT who presented on 2019 with erythema, pain, and edema of the right lower leg of four day duration. The patient was tachycardic to the 110s BPM on presentation, but exhibited no other signs of systemic infection. DVT studies and x-rays obtained on the day of presentation were unremarkable.    - Mr. Olivier endorses significant (7/10) pain this morning that is only partially improved with Tylenol. Started tramadol for pain control.  - Patient has no other symptoms. Denies fevers, chills, diaphoresis, dizziness, chest pain, heart palpitations, and bowel or urinary symptoms.  - Hives developed after treatment with IV vancomycin. Starting empiric treatment for MRSA and group A streptococcus with doxycycline and Unasyn, respectively.  - Patient has been tachycardic, likely due to pain. Increasing metoprolol dose from 25 mg to 50 mg PO BID due to underlying A-Fib.  - Portable CXR showed a small infrahilar infiltrate. Ordering two-view CXR.  - Blood  "cultures pending.     Review of Systems   Constitutional: Negative for chills and fever.   Respiratory: Positive for wheezing. Negative for cough and shortness of breath.    Cardiovascular: Positive for leg swelling. Negative for chest pain and palpitations.   Gastrointestinal: Negative for abdominal pain, constipation, diarrhea, nausea and vomiting.   Genitourinary: Negative for dysuria, frequency and urgency.   Musculoskeletal:        Right lower leg pain, erythema, edema   Skin: Positive for rash (right lower leg).   Neurological: Negative for dizziness and headaches.       Disposition/Barriers to discharge:   None    Consultants/Specialty    PCP: Pcp Pt States None      Quality Measures  Quality-Core Measures        Physical Exam       Vitals:    12/19/19 0558 12/19/19 0601 12/19/19 0832 12/19/19 0845   BP:  127/87 126/81 145/90   Pulse: (!) 118  (!) 118 (!) 121   Resp:    18   Temp:    36.9 °C (98.5 °F)   TempSrc:    Temporal   SpO2: 96%  95%    Weight:    124.6 kg (274 lb 11.1 oz)   Height:    1.778 m (5' 10\")     Body mass index is 39.41 kg/m². Weight: 124.6 kg (274 lb 11.1 oz)  Oxygen Therapy:  Pulse Oximetry: 95 %, O2 Delivery: None (Room Air)    Physical Exam   Constitutional: He is oriented to person, place, and time.   Obese man in moderate pain   HENT:   Head: Normocephalic and atraumatic.   Right Ear: External ear normal.   Left Ear: External ear normal.   Eyes: Conjunctivae and EOM are normal. Right eye exhibits no discharge. Left eye exhibits no discharge.   Neck: Normal range of motion. No JVD present.   Cardiovascular: Normal rate, normal heart sounds and intact distal pulses. Exam reveals no gallop and no friction rub.   No murmur heard.  Pulmonary/Chest: Effort normal. No stridor. No respiratory distress. He has wheezes. He has no rales. He exhibits no tenderness.   Abdominal: Soft. Bowel sounds are normal. He exhibits no distension and no mass. There is no tenderness. There is no rebound and no " "guarding.   Musculoskeletal:         General: Tenderness (right lower leg) and edema (right lower leg) present.      Comments: ROM of right leg limited by pain   Neurological: He is alert and oriented to person, place, and time.   Skin: Rash (poorly-demarcated erythematous region on anterior surface of right lower leg from ankle to 2\" below right knee) noted.   Psychiatric: Mood, memory, affect and judgment normal.             Assessment/Plan     * Cellulitis  Assessment & Plan  Patient is 63 YO male presents to day to worsening right lower extremity redness, tenderness  1/4 SIRS (tachycardia)  Normal right lower leg x-ray    Plan  IV fluids  Doxycycline for empiric coverage of MRSA  Unasyn for empiric coverage of group A streptococcus and anaerobes  Tramadol for pain      Deep vein thrombosis (HCC) [I82.409]- (present on admission)  Assessment & Plan  History of DVT without PE per patient  Ultrasound of bilateral lower extremities did not show any acute DVTs  Patient is already on anticoagulation with INR of 3 in ED  Warfarin per pharmacy with goal INR mentioned above    A-fib (HCC)- (present on admission)  Assessment & Plan  History of A. fib  On anticoagulation for MVR  INR 3 today  On metoprolol 25 twice daily at home  Patient went into A. fib with RVR heart rate into the 130s in ED.  Received 10 mg of diltiazem  Heart rate in 100s    Plan  Increasing metoprolol dose from 25 to 50 mg PO BID  Pharmacy for goal INR of 2-3.  Patient does report his INR supposed to be 2.5-3.5.  Day team to clarify as patient has bovine valve.  Reportedly, at some point he did have porcine valve which was replaced with bovine  Recent echo 2 months ago showed EF of 55%.  Patient does have elevated RV pressure at 50      Gross hematuria- (present on admission)  Assessment & Plan  Discharged on 12/16/2019 for hematuria secondary to nephrolithiasis right kidney, measuring at 8 mm    Resolved  Patient denies any hematuria, flank pain, " nausea, vomiting      Gout  Assessment & Plan  - Continue home allopurinol    Chest x-ray abnormality  Assessment & Plan  Chest x-ray showed a small infrahilar infiltrate. Patient denies fevers or dyspnea.    - Two-view CXR ordered, pending    COPD exacerbation (HCC)- (present on admission)  Assessment & Plan  Mild per patient    Levalbuterol to minimize tachycardia    Type 2 diabetes mellitus with complication, without long-term current use of insulin (HCC)- (present on admission)  Assessment & Plan  - ISS    Benign prostatic hyperplasia without lower urinary tract symptoms- (present on admission)  Assessment & Plan  - Continue home tamsulosin

## 2019-12-19 NOTE — ED PROVIDER NOTES
ER Provider Note     Scribed for Morales Chery M.D. by Elizabeth Caldwell. 12/19/2019, 3:44 AM.    Primary Care Provider: None noted  Means of Arrival: EMS   History obtained from: Patient  History limited by: None     CHIEF COMPLAINT  Chief Complaint   Patient presents with   • Leg Swelling     right       HPI  Morales Olivier is a 64 y.o. male who presents to the Emergency Department for acute, constant, moderate pain to the anterior right shin onset one day ago with no alleviation since onset. The patient reports that he has a history of deep vein thrombosis which occurred in 2009 after he was instructed to stop taking his prescribed dose of Coumadin. After experiencing the deep vein thrombosis, the patient was placed on Coumadin again which he notes he has been taking as prescribed. Over the past day, the patient reports that he has noticed the anterior aspect of his right shin has became painful, red and swollen. He notes that redness extends to the back of his right calf and states that the pain originates in his right shin and radiates all the way to his right hip. He describes his pain as sore in nature and rates the pain as a 8/10 in severity. Exacerbating factors include being the right leg and palpation to the respective area. No alleviating factors were reported. The patient does not report taking any over the counter medications for pain control. Denies recent symptoms of fevers, chills, chest pain or shortness of breath. He has no known allergies to medications.     REVIEW OF SYSTEMS  See HPI for further details. All other systems are negative.     PAST MEDICAL HISTORY   has a past medical history of Atrial fibrillation (HCC), Bronchitis, CAD (coronary artery disease), Gout, Heart valve disease, Hypertension, Pacemaker, Personal history of venous thrombosis and embolism (2009), PVC (premature ventricular contraction) (4/13/2019), Renal disorder, and Type II or unspecified type diabetes mellitus without  mention of complication, not stated as uncontrolled.    SURGICAL HISTORY   has a past surgical history that includes mitral valve replace (3/14/08); maze procedure (3/14/08); angiogram (7/3/2009); angiogram (2009); cath removal (2009); thrombectomy (2009); embolectomy (2009); irrigation & debridement general (2009); wide excision (2009); other cardiac surgery (); other abdominal surgery; mitral valve replace (3/8/2017); and lopez (3/8/2017).    SOCIAL HISTORY  Social History     Tobacco Use   • Smoking status: Former Smoker     Packs/day: 0.00     Last attempt to quit: 1981     Years since quittin.9   • Smokeless tobacco: Never Used   Substance Use Topics   • Alcohol use: No   • Drug use: No      Social History     Substance and Sexual Activity   Drug Use No       FAMILY HISTORY  Family History   Problem Relation Age of Onset   • Heart Disease Neg Hx        CURRENT MEDICATIONS  Current Outpatient Medications:   •  allopurinol (ZYLOPRIM) 100 MG Tab, Take 1 Tab by mouth every day for 180 days., Disp: 30 Tab, Rfl: 5  •  albuterol 108 (90 Base) MCG/ACT Aero Soln inhalation aerosol, Inhale 2 Puffs by mouth every 6 hours as needed for Shortness of Breath., Disp: 8.5 g, Rfl: 0  •  warfarin (COUMADIN) 5 MG Tab, Take 5-7 mg by mouth every evening. 7 mg on Wednesday and 5 mg all other days , Disp: , Rfl:   •  tamsulosin (FLOMAX) 0.4 MG capsule, Take 1 Cap by mouth ONE-HALF HOUR AFTER BREAKFAST for 180 days., Disp: 90 Cap, Rfl: 1  •  metFORMIN (GLUCOPHAGE) 500 MG Tab, Take 1 Tab by mouth 2 times a day, with meals for 180 days., Disp: 180 Tab, Rfl: 1  •  metoprolol (LOPRESSOR) 25 MG Tab, Take 1 Tab by mouth 2 times a day for 180 days., Disp: 180 Tab, Rfl: 1  •  acetaminophen (TYLENOL) 500 MG Tab, Take 500-1,000 mg by mouth every 6 hours as needed for Moderate Pain., Disp: , Rfl:     ALLERGIES  No Known Allergies    PHYSICAL EXAM  VITAL SIGNS: /81   Pulse 84   Temp 36.8 °C (98.3 °F)   " Resp 18   Ht 1.778 m (5' 10\")   Wt 119.7 kg (264 lb)   SpO2 99%   BMI 37.88 kg/m²       Constitutional: Alert in no apparent distress.  HENT: No signs of trauma, Bilateral external ears normal, Nose normal.   Eyes: Pupils are equal and reactive, Conjunctiva normal, Non-icteric.   Neck: Normal range of motion, No tenderness, Supple, No stridor.   Lymphatic: No lymphadenopathy noted.   Cardiovascular: Regular rate and rhythm, no murmurs.   Thorax & Lungs: Normal breath sounds, No respiratory distress, No wheezing, No chest tenderness.   Abdomen: Bowel sounds normal, Soft, No tenderness, No masses, No pulsatile masses. No peritoneal signs.  Skin: Warm, Dry, No erythema, No rash.   Back: No bony tenderness, No CVA tenderness.   Extremities: Intact distal pulses. No cyanosis. 1+ edema to right lower extremity with redness to the shin that extends to the back of the extremity. Brisk capillary refill  Musculoskeletal:  No major deformities noted. See extremities for further details.   Neurologic: Alert , Normal motor function, Normal sensory function, No focal deficits noted.   Psychiatric: Affect normal, Judgment normal, Mood normal.       DIAGNOSTIC STUDIES / PROCEDURES    LABS  Labs Reviewed   CBC WITH DIFFERENTIAL - Abnormal; Notable for the following components:       Result Value    RBC 3.86 (*)     Hemoglobin 12.7 (*)     Hematocrit 38.0 (*)     MCV 98.4 (*)     MCHC 33.4 (*)     RDW 53.7 (*)     Platelet Count 160 (*)     Neutrophils-Polys 78.10 (*)     Lymphocytes 9.50 (*)     Immature Granulocytes 1.10 (*)     Lymphs (Absolute) 0.84 (*)     All other components within normal limits    Narrative:     Indicate which anticoagulants the patient is on:->UNKNOWN   BASIC METABOLIC PANEL - Abnormal; Notable for the following components:    Glucose 154 (*)     All other components within normal limits    Narrative:     Indicate which anticoagulants the patient is on:->UNKNOWN   PROTHROMBIN TIME - Abnormal; Notable " for the following components:    PT 32.4 (*)     INR 3.02 (*)     All other components within normal limits    Narrative:     Indicate which anticoagulants the patient is on:->UNKNOWN   ESTIMATED GFR    Narrative:     Indicate which anticoagulants the patient is on:->UNKNOWN   MAGNESIUM    Narrative:     Indicate which anticoagulants the patient is on:->UNKNOWN   HEMOGLOBIN A1C   PROBRAIN NATRIURETIC PEPTIDE, NT   URINALYSIS     All labs reviewed by me.    RADIOLOGY  US-EXTREMITY VENOUS LOWER UNILAT RIGHT   Final Result      DX-CHEST-LIMITED (1 VIEW)    (Results Pending)      The radiologist's interpretation of all radiological studies have been reviewed by me.    EKG  12 Lead EKG interpreted by me to show:  Atrial fibrillation with rapid ventricular rate  Rate 127  Axis: Normal  Intervals: Normal  Nonspecific T wave changes  Normal ST segments  My impression of this EKG: Does not indicate ischemia or arrhythmia at this time.      COURSE & MEDICAL DECISION MAKING  Pertinent Labs & Imaging studies reviewed. (See chart for details)    This is a 64 y.o. male that presents with right leg pain.  This could present a new DVT versus cellulitis.  Patient is also tachycardic with a history of atrial fibrillation.  I am concerned he may be in A. fib with rapid ventricular rate..     3:44 AM - Patient was seen and evaluated at bedside. Patient presents to the ED with the above complaint. Discussed plan of care with patient which includes ordering lab work for further evaluation of his condition. An ultrasound of his right lower extremity will also be performed to rule out DVT. He will be medicated with Morphine for pain control and was instructed to remain NPO until his lab work and imaging results are finalized. Patient's verbalizes their understanding and agreement to the plan of care. Ordered INR, BMP, CBC with differential, US-extremity venous lower unilateral right. Patient was medicated with Morphine 4 mg for pain  control.     4:38 PM Recheck. The patient appears to be tachycardic with a rate of 120 beats per minute. Ordered EKG. Patient was medicated with Cardizem 10 mg and Morphine 4 mg for further pain control. Patient has an INR of 3.  The patient has no significant leukocytosis.  The patient is mildly anemic.  The patient has no electrolyte derangements.  Ultrasound of the lower extremities negative for DVT.  The patient has a heart rate that is improving with diltiazem.      5:10 AM Reviewed the patient's ultrasound results which are negative for a deep vein thrombosis. I suspect his symptoms are secondary to cellulitis to the right lower extremity. He will be started on IV antibiotics and will need to be admitted overnight for further evaluation of his condition. Patient was medicated with Rocephin 1g in  mL IVPB and Vancocin 3,000 mg in  mL IVPB    5:11 AM At this time, Dr. Nassar ([facility]) was paged    5:13 AM I discussed the patient's case and the above findings with Dr. Nassar ([facility]) who agrees to evaluate the patient for hospitalization.       DISPOSITION:  Patient will be hospitalized by Dr. Nassar  ([facility]) in guarded condition.      FINAL IMPRESSION  1. Cellulitis of right lower extremity    2. Atrial fibrillation with RVR (HCC)          Elizabeth JARRETT (Trisha), am scribing for, and in the presence of, Morales Chery M.D..    Electronically signed by: Elizabeth Damon), 12/19/2019    Morales JARRETT M.D. personally performed the services described in this documentation, as scribed by Elizabeth Caldwell in my presence, and it is both accurate and complete.     C    The note accurately reflects work and decisions made by me.  Morales Chery  12/19/2019  6:02 AM

## 2019-12-19 NOTE — ED NOTES
RN spoke with MD regarding antibiotic reaction (welts to forearm/pruritis). Infusion stopped. Will continue to monitor.

## 2019-12-19 NOTE — PROGRESS NOTES
"Assumed pt care. Report received from OSWALD Mata. Pt c/o RLE pain 8/10 at rest and an inability to move. Medicated per MAR. Afib with frequent PVCs on monitor sustaining in 120s-130s. Pt denies CP, palpitations, but repeatedly that he doesn't \"feel well.\" MD paged to update re: HR and subjective complaints. New orders received.   "

## 2019-12-20 ENCOUNTER — APPOINTMENT (OUTPATIENT)
Dept: RADIOLOGY | Facility: MEDICAL CENTER | Age: 64
DRG: 554 | End: 2019-12-20
Attending: STUDENT IN AN ORGANIZED HEALTH CARE EDUCATION/TRAINING PROGRAM
Payer: MEDICARE

## 2019-12-20 PROBLEM — M86.9 OSTEOMYELITIS (HCC): Status: ACTIVE | Noted: 2019-12-20

## 2019-12-20 LAB
ANION GAP SERPL CALC-SCNC: 6 MMOL/L (ref 0–11.9)
BASOPHILS # BLD AUTO: 0.5 % (ref 0–1.8)
BASOPHILS # BLD: 0.04 K/UL (ref 0–0.12)
BUN SERPL-MCNC: 21 MG/DL (ref 8–22)
CALCIUM SERPL-MCNC: 8.6 MG/DL (ref 8.5–10.5)
CHLORIDE SERPL-SCNC: 108 MMOL/L (ref 96–112)
CHOLEST SERPL-MCNC: 105 MG/DL (ref 100–199)
CK SERPL-CCNC: 167 U/L (ref 0–154)
CO2 SERPL-SCNC: 25 MMOL/L (ref 20–33)
CREAT SERPL-MCNC: 1.23 MG/DL (ref 0.5–1.4)
EKG IMPRESSION: NORMAL
EKG IMPRESSION: NORMAL
EOSINOPHIL # BLD AUTO: 0.28 K/UL (ref 0–0.51)
EOSINOPHIL NFR BLD: 3.5 % (ref 0–6.9)
ERYTHROCYTE [DISTWIDTH] IN BLOOD BY AUTOMATED COUNT: 55.2 FL (ref 35.9–50)
ERYTHROCYTE [SEDIMENTATION RATE] IN BLOOD BY WESTERGREN METHOD: 16 MM/HOUR (ref 0–20)
GLUCOSE BLD-MCNC: 159 MG/DL (ref 65–99)
GLUCOSE BLD-MCNC: 98 MG/DL (ref 65–99)
GLUCOSE BLD-MCNC: 99 MG/DL (ref 65–99)
GLUCOSE SERPL-MCNC: 124 MG/DL (ref 65–99)
HCT VFR BLD AUTO: 38.7 % (ref 42–52)
HDLC SERPL-MCNC: 21 MG/DL
HGB BLD-MCNC: 12.7 G/DL (ref 14–18)
IMM GRANULOCYTES # BLD AUTO: 0.05 K/UL (ref 0–0.11)
IMM GRANULOCYTES NFR BLD AUTO: 0.6 % (ref 0–0.9)
INR PPP: 3.07 (ref 0.87–1.13)
LDLC SERPL CALC-MCNC: 63 MG/DL
LYMPHOCYTES # BLD AUTO: 0.91 K/UL (ref 1–4.8)
LYMPHOCYTES NFR BLD: 11.2 % (ref 22–41)
MCH RBC QN AUTO: 32.9 PG (ref 27–33)
MCHC RBC AUTO-ENTMCNC: 32.8 G/DL (ref 33.7–35.3)
MCV RBC AUTO: 100.3 FL (ref 81.4–97.8)
MONOCYTES # BLD AUTO: 0.55 K/UL (ref 0–0.85)
MONOCYTES NFR BLD AUTO: 6.8 % (ref 0–13.4)
NEUTROPHILS # BLD AUTO: 6.26 K/UL (ref 1.82–7.42)
NEUTROPHILS NFR BLD: 77.4 % (ref 44–72)
NRBC # BLD AUTO: 0 K/UL
NRBC BLD-RTO: 0 /100 WBC
PLATELET # BLD AUTO: 154 K/UL (ref 164–446)
PMV BLD AUTO: 9.8 FL (ref 9–12.9)
POTASSIUM SERPL-SCNC: 4.2 MMOL/L (ref 3.6–5.5)
PROTHROMBIN TIME: 32.9 SEC (ref 12–14.6)
RBC # BLD AUTO: 3.86 M/UL (ref 4.7–6.1)
SCCMEC + MECA PNL NOSE NAA+PROBE: NEGATIVE
SCCMEC + MECA PNL NOSE NAA+PROBE: NEGATIVE
SODIUM SERPL-SCNC: 139 MMOL/L (ref 135–145)
TRIGL SERPL-MCNC: 104 MG/DL (ref 0–149)
TROPONIN T SERPL-MCNC: 14 NG/L (ref 6–19)
WBC # BLD AUTO: 8.1 K/UL (ref 4.8–10.8)

## 2019-12-20 PROCEDURE — 36415 COLL VENOUS BLD VENIPUNCTURE: CPT

## 2019-12-20 PROCEDURE — 700102 HCHG RX REV CODE 250 W/ 637 OVERRIDE(OP): Performed by: STUDENT IN AN ORGANIZED HEALTH CARE EDUCATION/TRAINING PROGRAM

## 2019-12-20 PROCEDURE — 94640 AIRWAY INHALATION TREATMENT: CPT

## 2019-12-20 PROCEDURE — 700111 HCHG RX REV CODE 636 W/ 250 OVERRIDE (IP): Performed by: STUDENT IN AN ORGANIZED HEALTH CARE EDUCATION/TRAINING PROGRAM

## 2019-12-20 PROCEDURE — 700101 HCHG RX REV CODE 250: Performed by: INTERNAL MEDICINE

## 2019-12-20 PROCEDURE — 80061 LIPID PANEL: CPT

## 2019-12-20 PROCEDURE — 87640 STAPH A DNA AMP PROBE: CPT

## 2019-12-20 PROCEDURE — 97166 OT EVAL MOD COMPLEX 45 MIN: CPT

## 2019-12-20 PROCEDURE — G0378 HOSPITAL OBSERVATION PER HR: HCPCS

## 2019-12-20 PROCEDURE — 82962 GLUCOSE BLOOD TEST: CPT | Mod: 91

## 2019-12-20 PROCEDURE — 93005 ELECTROCARDIOGRAM TRACING: CPT | Performed by: INTERNAL MEDICINE

## 2019-12-20 PROCEDURE — 700105 HCHG RX REV CODE 258: Performed by: STUDENT IN AN ORGANIZED HEALTH CARE EDUCATION/TRAINING PROGRAM

## 2019-12-20 PROCEDURE — A9270 NON-COVERED ITEM OR SERVICE: HCPCS | Performed by: STUDENT IN AN ORGANIZED HEALTH CARE EDUCATION/TRAINING PROGRAM

## 2019-12-20 PROCEDURE — 85025 COMPLETE CBC W/AUTO DIFF WBC: CPT

## 2019-12-20 PROCEDURE — 85610 PROTHROMBIN TIME: CPT

## 2019-12-20 PROCEDURE — 770020 HCHG ROOM/CARE - TELE (206)

## 2019-12-20 PROCEDURE — 96376 TX/PRO/DX INJ SAME DRUG ADON: CPT

## 2019-12-20 PROCEDURE — 96366 THER/PROPH/DIAG IV INF ADDON: CPT

## 2019-12-20 PROCEDURE — 80048 BASIC METABOLIC PNL TOTAL CA: CPT

## 2019-12-20 PROCEDURE — 87641 MR-STAPH DNA AMP PROBE: CPT

## 2019-12-20 PROCEDURE — 93922 UPR/L XTREMITY ART 2 LEVELS: CPT

## 2019-12-20 PROCEDURE — 84484 ASSAY OF TROPONIN QUANT: CPT

## 2019-12-20 PROCEDURE — 700117 HCHG RX CONTRAST REV CODE 255: Performed by: STUDENT IN AN ORGANIZED HEALTH CARE EDUCATION/TRAINING PROGRAM

## 2019-12-20 PROCEDURE — 93010 ELECTROCARDIOGRAM REPORT: CPT | Performed by: INTERNAL MEDICINE

## 2019-12-20 PROCEDURE — 73701 CT LOWER EXTREMITY W/DYE: CPT | Mod: RT

## 2019-12-20 PROCEDURE — 97161 PT EVAL LOW COMPLEX 20 MIN: CPT

## 2019-12-20 PROCEDURE — 96372 THER/PROPH/DIAG INJ SC/IM: CPT

## 2019-12-20 PROCEDURE — 99233 SBSQ HOSP IP/OBS HIGH 50: CPT | Mod: GC | Performed by: INTERNAL MEDICINE

## 2019-12-20 PROCEDURE — 85652 RBC SED RATE AUTOMATED: CPT

## 2019-12-20 PROCEDURE — 82550 ASSAY OF CK (CPK): CPT

## 2019-12-20 RX ORDER — CEPHALEXIN 500 MG/1
500 CAPSULE ORAL EVERY 6 HOURS
Status: DISCONTINUED | OUTPATIENT
Start: 2019-12-20 | End: 2019-12-22 | Stop reason: HOSPADM

## 2019-12-20 RX ORDER — LEVALBUTEROL INHALATION SOLUTION 1.25 MG/3ML
1.25 SOLUTION RESPIRATORY (INHALATION)
Status: DISCONTINUED | OUTPATIENT
Start: 2019-12-20 | End: 2019-12-21

## 2019-12-20 RX ORDER — WARFARIN SODIUM 5 MG/1
5 TABLET ORAL DAILY
Status: DISCONTINUED | OUTPATIENT
Start: 2019-12-20 | End: 2019-12-22 | Stop reason: HOSPADM

## 2019-12-20 RX ORDER — INDOMETHACIN 50 MG/1
50 CAPSULE ORAL
Status: DISCONTINUED | OUTPATIENT
Start: 2019-12-20 | End: 2019-12-21

## 2019-12-20 RX ORDER — HYDROMORPHONE HYDROCHLORIDE 1 MG/ML
0.5 INJECTION, SOLUTION INTRAMUSCULAR; INTRAVENOUS; SUBCUTANEOUS
Status: DISCONTINUED | OUTPATIENT
Start: 2019-12-20 | End: 2019-12-20 | Stop reason: CLARIF

## 2019-12-20 RX ORDER — MORPHINE SULFATE 4 MG/ML
2 INJECTION, SOLUTION INTRAMUSCULAR; INTRAVENOUS ONCE
Status: COMPLETED | OUTPATIENT
Start: 2019-12-20 | End: 2019-12-20

## 2019-12-20 RX ADMIN — HYDROCODONE BITARTRATE AND ACETAMINOPHEN 2 TABLET: 5; 325 TABLET ORAL at 16:09

## 2019-12-20 RX ADMIN — INSULIN HUMAN 2 UNITS: 100 INJECTION, SOLUTION PARENTERAL at 21:48

## 2019-12-20 RX ADMIN — VANCOMYCIN HYDROCHLORIDE 3000 MG: 500 INJECTION, POWDER, LYOPHILIZED, FOR SOLUTION INTRAVENOUS at 11:03

## 2019-12-20 RX ADMIN — WARFARIN SODIUM 5 MG: 5 TABLET ORAL at 18:51

## 2019-12-20 RX ADMIN — MORPHINE SULFATE 2 MG: 4 INJECTION INTRAVENOUS at 03:52

## 2019-12-20 RX ADMIN — DOXYCYCLINE 100 MG: 100 TABLET, FILM COATED ORAL at 05:17

## 2019-12-20 RX ADMIN — IOHEXOL 100 ML: 350 INJECTION, SOLUTION INTRAVENOUS at 07:23

## 2019-12-20 RX ADMIN — SODIUM CHLORIDE, POTASSIUM CHLORIDE, SODIUM LACTATE AND CALCIUM CHLORIDE 1000 ML: 600; 310; 30; 20 INJECTION, SOLUTION INTRAVENOUS at 21:00

## 2019-12-20 RX ADMIN — TAMSULOSIN HYDROCHLORIDE 0.4 MG: 0.4 CAPSULE ORAL at 11:05

## 2019-12-20 RX ADMIN — HYDROCODONE BITARTRATE AND ACETAMINOPHEN 2 TABLET: 5; 325 TABLET ORAL at 09:32

## 2019-12-20 RX ADMIN — TRAMADOL HYDROCHLORIDE 50 MG: 50 TABLET, FILM COATED ORAL at 04:48

## 2019-12-20 RX ADMIN — METOPROLOL TARTRATE 50 MG: 25 TABLET, FILM COATED ORAL at 18:50

## 2019-12-20 RX ADMIN — AMPICILLIN SODIUM AND SULBACTAM SODIUM 3 G: 2; 1 INJECTION, POWDER, FOR SOLUTION INTRAMUSCULAR; INTRAVENOUS at 05:17

## 2019-12-20 RX ADMIN — TRAMADOL HYDROCHLORIDE 50 MG: 50 TABLET, FILM COATED ORAL at 21:11

## 2019-12-20 RX ADMIN — ALLOPURINOL 100 MG: 100 TABLET ORAL at 05:16

## 2019-12-20 RX ADMIN — LEVALBUTEROL 1.25 MG: 1.25 SOLUTION RESPIRATORY (INHALATION) at 10:45

## 2019-12-20 RX ADMIN — CEPHALEXIN 500 MG: 500 CAPSULE ORAL at 18:52

## 2019-12-20 RX ADMIN — INDOMETHACIN 50 MG: 50 CAPSULE ORAL at 18:52

## 2019-12-20 RX ADMIN — METOPROLOL TARTRATE 50 MG: 25 TABLET, FILM COATED ORAL at 05:16

## 2019-12-20 RX ADMIN — HYDROCODONE BITARTRATE AND ACETAMINOPHEN 2 TABLET: 5; 325 TABLET ORAL at 03:06

## 2019-12-20 RX ADMIN — AMPICILLIN SODIUM AND SULBACTAM SODIUM 3 G: 2; 1 INJECTION, POWDER, FOR SOLUTION INTRAMUSCULAR; INTRAVENOUS at 16:12

## 2019-12-20 RX ADMIN — PROCHLORPERAZINE MALEATE 10 MG: 10 TABLET ORAL at 06:35

## 2019-12-20 RX ADMIN — SENNOSIDES AND DOCUSATE SODIUM 2 TABLET: 8.6; 5 TABLET ORAL at 05:16

## 2019-12-20 ASSESSMENT — ENCOUNTER SYMPTOMS
CHILLS: 0
SHORTNESS OF BREATH: 0
BLURRED VISION: 0
COUGH: 1
FEVER: 0
VOMITING: 0
ABDOMINAL PAIN: 0
NAUSEA: 1
DIZZINESS: 0
PALPITATIONS: 1
DIARRHEA: 0

## 2019-12-20 ASSESSMENT — COGNITIVE AND FUNCTIONAL STATUS - GENERAL
DAILY ACTIVITIY SCORE: 23
MOBILITY SCORE: 23
DRESSING REGULAR LOWER BODY CLOTHING: A LITTLE
SUGGESTED CMS G CODE MODIFIER MOBILITY: CI
CLIMB 3 TO 5 STEPS WITH RAILING: A LITTLE
SUGGESTED CMS G CODE MODIFIER DAILY ACTIVITY: CI

## 2019-12-20 ASSESSMENT — ACTIVITIES OF DAILY LIVING (ADL): TOILETING: INDEPENDENT

## 2019-12-20 ASSESSMENT — GAIT ASSESSMENTS
ASSISTIVE DEVICE: FRONT WHEEL WALKER
GAIT LEVEL OF ASSIST: SUPERVISED
DEVIATION: ANTALGIC;STEP TO
DISTANCE (FEET): 200

## 2019-12-20 NOTE — PROGRESS NOTES
Blood drawn, sent to lab. EKG ordered, 's. Pt pain in 10/10 on his Rt leg/knee. MD ordered pain medication, morphine 2mg IV x1 via telephone with read back.

## 2019-12-20 NOTE — PROGRESS NOTES
Received report from Ngoc AKERS. Assumed pt care. Pain on his Rt leg, 8/10. (denies meds for now, want to wait after ABx). Update pt on POC. Pain mangement, ABx, PT/OT.

## 2019-12-20 NOTE — THERAPY
"Occupational Therapy Evaluation completed.   Functional Status:  Mod A to don/doff socks.  Supervision supine to sit, sit to stand.  Pt walked hallway w/FWW and reports fear of falling due to pain.  Pt declined further ADL's and returned to bed with min A.  Plan of Care: Will benefit from Occupational Therapy 2 times per week  Discharge Recommendations:  Equipment: Will Continue to Assess for Equipment Needs. .Anticipate that the patient will have no further occupational therapy needs after discharge from the hospital.     Pt is 65 y/o M seen for OT evaluation.  Pt admitted with RLE cellulitis.  Pt with hx of MVR, DVT, afib, HTN, and COPD.  Pt reports he was having difficulty with ADL's due to pain.  Pt continues to demonstrate mild functional deficits due to RLE pain.  Pt will continue to benefit from acute OT services while he remains in house, however anticipate when medical condition resolves, pt will return to baseline.    See \"Rehab Therapy-Acute\" Patient Summary Report for complete documentation.    "

## 2019-12-20 NOTE — FLOWSHEET NOTE
"   12/20/19 0810   Events/Summary/Plan   Events/Summary/Plan Pt refused states it makes him too \"hopped up\" denies diffculty breathing and SOB   Therapy Not Performed   Type of Therapy Not Performed SVN   Reason Therapy Not Performed Refused     "

## 2019-12-20 NOTE — THERAPY
"Pt admitted w/ right LE pain.  Negative for DVT.  He actually c/o pain in the right knee joint itself.  He does attest to having gout, but in his left knee, in the past.  Today, he is able to move in/out of bed w/o assist.  Able to stand and ambulate w/ a fww w/o loss of balance.  Recommend a fww for d/c due to pain.  Anticpate that as pain becomes better controlled, he will naturally wean from the fww.  Recommend oob/amb prn w/ nsg.  No acute PT needs.    Physical Therapy Evaluation completed.   Bed Mobility:   spv  Transfers:  spv  Gait:   with Front-Wheel Walker       Plan of Care: Patient with no further skilled PT needs in the acute care setting at this time  Discharge Recommendations: Equipment: Front-Wheel Walker. Post-acute therapy   Anticipate that the patient will have no further physical therapy needs after discharge from the hospital.  See \"Rehab Therapy-Acute\" Patient Summary Report for complete documentation.     "

## 2019-12-20 NOTE — RESPIRATORY CARE
COPD EDUCATION by COPD CLINICAL EDUCATOR  12/20/2019 at 2:14 PM by Ivonne Leigh     Patient interviewed by COPD education team. Patient refused COPD program at this time.

## 2019-12-20 NOTE — ASSESSMENT & PLAN NOTE
Mr. Olivier is a 64-year-old man with a history of atrial fibrillation, MVR s/p bovine valve replacement, SSS s/p pacemaker, and gout who presented on 12/19/2019 with erythema, pain, and edema of the right lower leg of four day duration. He was initially diagnosed with cellulitis and started on doxycycline for MRSA coverage and Unasyn for anaerobe/Gram-negative coverage. CT scan obtained on 12/20/2019 showed signs of osteomyelitis affecting the right second and fourth toes. However, per infectious disease, the symptoms are far more suggestive of an acute gout flare affecting the right knee with overlying cellulitis.    - Keflex PO for cellulitis  - Gout treatment as above  - Norco and tramadol for pain

## 2019-12-20 NOTE — PROGRESS NOTES
"Pharmacy Kinetics 64 y.o. male on vancomycin day # 1   2019    Currently on Vancomycin 3000 mg IV x1 followed by 2000 mg IV q24h    Indication for Treatment: OM right 2nd and 4th toes    Pertinent history per medical record: Admitted on 2019 for RLE redness and pain. CT LE with likely OM. Broad spectrum abx started for now. ID consulted.    Other antibiotics: Unasyn 3 g IV q6h    Allergies: Patient has no known allergies.     List concerns for renal function: BMI, age, poor baseline renal fxn    Pertinent cultures to date:   19: Blood, peripheral x2 = NGTD    Recent Labs     19  0333 19  0315   WBC 8.8 8.1   NEUTSPOLYS 78.10* 77.40*     Recent Labs     19  0333 19  0315   BUN 21 21   CREATININE 1.21 1.23     Intake/Output Summary (Last 24 hours) at 2019 0913  Last data filed at 2019 0435  Gross per 24 hour   Intake 240 ml   Output 1480 ml   Net -1240 ml      /79   Pulse 80   Temp 36.2 °C (97.2 °F) (Temporal)   Resp 18   Ht 1.778 m (5' 10\")   Wt 124.6 kg (274 lb 11.1 oz)   SpO2 96%  Temp (24hrs), Av.4 °C (97.5 °F), Min:36.2 °C (97.2 °F), Max:36.8 °C (98.2 °F)      A/P   1. Vancomycin dose change: new start  2. Next vancomycin level: 2 days (not ordered)  3. Goal trough: 12-16 mcg/mL  4. Comments: Patient received about ~1000 mg yesterday, reloaded today, doubt patient will be able to tolerate q12h dosing, started conservative maintenance dosing a little early to help get level up sooner. Trough in a couple of days if remains on vanco.    Kyaw Costello, PharmD, BCPS      "

## 2019-12-20 NOTE — PROGRESS NOTES
Internal Medicine Interval Note  Note Author: Scottie Bansal M.D.     Name Morales Olivier     1955   Age/Sex 64 y.o. male   MRN [id number]   Code Status Full Code     After 5PM or if no immediate response to page, please call for cross-coverage  Attending/Team: Dr. Shah/Wes See Patient List for primary contact information  Call [phone] to page    1st Call - Day Intern (R1):   Scottie Bansal 2nd Call - Day Sr. Resident (R2/R3):   Mason Stevenson         Reason for interval visit  (Principal Problem)   Osteomyelitis (HCC)    Interval Problem Daily Status Update  (24 hours, problem oriented, brief subjective history, new lab/imaging data pertinent to that problem)     Mr. Morales Olivier is a 64-year-old man with  atrial fibrillation (rate-controlled with metoprolol and on warfarin anticoagulation), sick sinus syndrome status-post pacemaker, hypertension, diabetes mellitus, mitral valve regurgitation status-post bovine valve replacement, and a history of DVT who presented on 2019 with erythema, pain, and edema of the right lower leg of four day duration. The patient was tachycardic to the 110s BPM on presentation, but exhibited no other signs of systemic infection. DVT studies and x-rays obtained on the day of presentation were unremarkable. A CT scan obtained on 2019 showed evidence of osteomyelitis affecting the right second and fourth toes.    - CT scan ordered today showed evidence of osteomyelitis affecting the right second and fourth toes. Discontinued doxycycline and started vancomycin IV. Infectious disease consult has been requested.   - Mr. Olivier experienced an episode of chest pain overnight. EKG and troponin were unremarkable. Chest pain resolved this morning.  - Right leg pain is modestly improved since yesterday. Patient is able to bear weight, but can not ambulate comfortably.  - Tachycardia improved with metoprolol 50 mg PO BID. However, patient still had episodes  of tachycardia to the 130s BPM overnight.   - Mild wheezing on physical examination. Patient refused levalbuterol. Avoiding albuterol due to tachycardia.  - Blood cultures pending.     Review of Systems   Constitutional: Negative for chills and fever.   HENT: Negative for hearing loss.    Eyes: Negative for blurred vision.   Respiratory: Positive for cough. Negative for shortness of breath.    Cardiovascular: Positive for chest pain (overnight, now resolved), palpitations and leg swelling.   Gastrointestinal: Positive for nausea. Negative for abdominal pain, diarrhea and vomiting.   Skin: Positive for rash.   Neurological: Negative for dizziness.       Disposition/Barriers to discharge:   None    Consultants/Specialty  Infectious disease: Dr. Lyles  PCP: Pcp Pt States None      Quality Measures  Quality-Core Measures        Physical Exam       Vitals:    12/20/19 0631 12/20/19 0647 12/20/19 0820 12/20/19 1045   BP: 143/79  110/79    Pulse: (!) 135 95 80 80   Resp: 20 20 18 16   Temp:   36.2 °C (97.2 °F)    TempSrc:   Temporal    SpO2: 96%   95%   Weight:       Height:         Body mass index is 39.41 kg/m².    Oxygen Therapy:  Pulse Oximetry: 95 %, O2 (LPM): 0, O2 Delivery: None (Room Air)    Physical Exam  Constitutional: He is oriented to person, place, and time.   HENT:   Head: Normocephalic and atraumatic.   Right Ear: External ear normal.   Left Ear: External ear normal.   Eyes: Conjunctivae and EOM are normal. Right eye exhibits no discharge. Left eye exhibits no discharge.   Neck: Normal range of motion. No JVD present.   Cardiovascular: Normal rate, normal heart sounds and intact distal pulses. Exam reveals no gallop and no friction rub.   No murmur heard.  Diminished R pedal pulses  Pulmonary/Chest: Effort normal. No stridor. No respiratory distress. He has wheezes (unchanged). He has no rales. He exhibits no tenderness.   Abdominal: Soft. Bowel sounds are normal. He exhibits no distension and no mass.  "There is no tenderness. There is no rebound and no guarding.   Musculoskeletal:         General: Tenderness (right lower leg) and edema (right lower leg) present.      Comments: ROM of right leg limited by pain   Neurological: He is alert and oriented to person, place, and time.   Skin: Rash (poorly-demarcated erythematous region on anterior surface of right lower leg from ankle to 2\" below right knee, unchanged) noted.   Psychiatric: Mood, memory, affect and judgment normal.        Assessment/Plan     * Osteomyelitis ([facility])  Assessment & Plan  Mr. Olivier is a 64-year-old man with a history of atrial fibrillation, MVR s/p bovine valve replacement, SSS s/p pacemaker, and gout who presented on 12/19/2019 with erythema, pain, and edema of the right lower leg of four day duration. He was initially diagnosed with cellulitis and started on doxycycline for MRSA coverage and Unasyn for anaerobe/Gram-negative coverage. CT scan obtained on 12/20/2019 showed signs of osteomyelitis affecting the right second and fourth toes.    - Vancomycin IV and Unasyn for empiric coverage of MRSA and anaerobes/Gram-negative organisms, respectively  - Norco for pain  - Compazine for nausea   - Monitor use and QTc  - MARJORIE due to diminished R pedal pulses  - Infectious disease consult requested, pending. Appreciate recommendations.    Cellulitis  Assessment & Plan  Please see assessment and plan for \"osteomyelitis.\"      Deep vein thrombosis (HCC) [I82.409]- (present on admission)  Assessment & Plan  History of DVT without PE per patient  Ultrasound of bilateral lower extremities did not show any acute DVTs  Patient is already on anticoagulation with INR of 3 in ED  Warfarin per pharmacy with goal INR mentioned above    A-fib ([facility])- (present on admission)  Assessment & Plan  History of A. fib  On anticoagulation for MVR  INR 3 today  On metoprolol 25 twice daily at home  Patient went into A. fib with RVR heart rate into the 130s in ED.  Received " 10 mg of diltiazem  Heart rate in 100s    Plan  Continue metoprolol 50 mg PO BID  Pharmacy for goal INR of 2-3.  Patient does report his INR supposed to be 2.5-3.5.  Day team to clarify as patient has bovine valve.  Reportedly, at some point he did have porcine valve which was replaced with bovine  Recent echo 2 months ago showed EF of 55%.  Patient does have elevated RV pressure at 50      Gross hematuria- (present on admission)  Assessment & Plan  Discharged on 12/16/2019 for hematuria secondary to nephrolithiasis right kidney, measuring at 8 mm    Resolved  Patient denies any hematuria, flank pain, nausea, vomiting      Gout  Assessment & Plan  - Continue home allopurinol    Chest x-ray abnormality  Assessment & Plan  Chest x-ray showed a small infrahilar infiltrate. Patient denies fevers or dyspnea.    - Two-view CXR showed mild interstitial opacities consistent with atelectasis/mild edema  - Continue to monitor    COPD exacerbation (HCC)- (present on admission)  Assessment & Plan  Mild per patient    Levalbuterol to minimize tachycardia    Type 2 diabetes mellitus with complication, without long-term current use of insulin (HCC)- (present on admission)  Assessment & Plan  - ISS    Benign prostatic hyperplasia without lower urinary tract symptoms- (present on admission)  Assessment & Plan  - Continue home tamsulosin

## 2019-12-20 NOTE — PROGRESS NOTES
"Assumed pt care at 0700. Received report from Germán AKERS. A&O x4. Pt reports pain still present to R leg, pain medication not due at this time, pt verbalizes understanding, agreeable to wait. Mild WOB noted, on RA, refused breathing tx this AM, states \"that messed me up yesterday.\" Cardiac monitor applied, partially paced with rhythm of afib noted. Pt reports episode of CP earlier but denies at this time. R leg slightly reddened and swollen, tender to palpation.    Updated on POC, communication board updated. Bed locked and in lowest position. Call light and belongings within reach. Non-skid socks in place. Needs met, will continue to monitor.  "

## 2019-12-20 NOTE — CONSULTS
DATE OF SERVICE:  12/20/2019     REFERRING PHYSICIAN:  Dr. Shah     REASON FOR CONSULTATION: Right 2nd and 4th toe osteomyelitis      HISTORY OF PRESENT ILLNESS:  Patient is a 64 y.o. male who has a past medical history of gout, A. fib, sick sinus syndrome status post pacemaker, hypertension, diabetes, presented on 12/19/2019 with increased redness, pain, swelling of his right lower extremity on the anterior part of the leg, below his knee, which started about 4 to 5 days ago.  He did not notice any fever, chills.  Pain was progressively worsening so he decided come in to the hospital.  No previous history of infection in that leg however had 'bone infection' on the other leg about a year ago.  ight lower extremity toes intact, no open wound, normal range of motion.  Denies any trauma, abscess, draining pus in the affected extremity. He was admitted recently about a month ago for Gout flare up when he was treated with Colchicine.     On presentation to the ER, he received vancomycin and ceftriaxone later antibiotic was switched to vancomycin and Unasyn.  Blood culture is negative so far, for worsening leg pain CT lower extremity with contrast was done which showed evidence of osteomyelitis affecting the right second and fourth toes.  Infectious disease was consulted for antibiotic choice and duration of therapy.    No previous history of MRSA, no known IV drug user, known diabetic last HbA1c 7.2 on admission.  Previous admission for gout exacerbation, is on allopurinol 300 mg suppressive therapy.  Hemodynamically stable.     REVIEW OF SYSTEMS:    Constitutional: Negative for fever and malaise/fatigue.   HENT: Negative for hearing loss and visual changes   Eyes: Negative for blurred vision, double vision and photophobia.   Respiratory: Negative for cough.   Cardiovascular: Negative for chest pain and leg swelling.   Gastrointestinal: Negative for nausea, vomiting and diarrhea.   Musculoskeletal: Negative for  myalgias and back pain.   Skin: + Erythema, pain, swelling on right lower extremity.  Neurological: Negative for sensory change, focal weakness and headaches.     ALLERGIES:  No Known Allergies     PAST MEDICAL HISTORY:   Past Medical History:   Diagnosis Date   • Atrial fibrillation (HCC)    • Bronchitis    • CAD (coronary artery disease)    • Gout    • Heart valve disease     mitral valve replacement (bovine)   • Hypertension    • Pacemaker    • Personal history of venous thrombosis and embolism 2009    DVT right leg   • PVC (premature ventricular contraction) 4/13/2019   • Renal disorder     kidney stones   • Type II or unspecified type diabetes mellitus without mention of complication, not stated as uncontrolled     DM type II- diet controlled       PAST SURGICAL HISTORY:    Past Surgical History:   Procedure Laterality Date   • MITRAL VALVE REPLACE  3/8/2017    Procedure: MITRAL VALVE REPLACE-REDO;  Surgeon: Cornelia Wright M.D.;  Location: [facility];  Service:    • KD  3/8/2017    Procedure: KD;  Surgeon: Cornelia Wright M.D.;  Location: [facility];  Service:    • IRRIGATION & DEBRIDEMENT GENERAL  7/20/2009    Performed by MICHEL URBINA at [facility]   • WIDE EXCISION  7/20/2009    Performed by MICHEL URBINA at [facility]   • ANGIOGRAM  7/4/2009    Performed by MICHEL URBINA at [facility]   • CATH REMOVAL  7/4/2009    Performed by MICHEL URBINA at [facility]   • THROMBECTOMY  7/4/2009    Performed by MICHEL URBINA at [facility]   • EMBOLECTOMY  7/4/2009    Performed by MICHEL URBINA at [facility]   • ANGIOGRAM  7/3/2009    Performed by MORGAN WARD at [facility]   • MITRAL VALVE REPLACE  3/14/08    Performed by CORNELIA WRIGHT at [facility]   • MAZE PROCEDURE  3/14/08    Performed by CORNELIA WRIGHT at [facility]   • OTHER CARDIAC  SURGERY  2008    mitral valve replacement   • OTHER ABDOMINAL SURGERY      imbulica repair        FAMILY HISTORY:    Family History   Problem Relation Age of Onset   • Heart Disease Neg Hx         SOCIAL HISTORY:    Social History     Socioeconomic History   • Marital status: Single     Spouse name: Not on file   • Number of children: Not on file   • Years of education: Not on file   • Highest education level: Not on file   Occupational History   • Not on file   Social Needs   • Financial resource strain: Not on file   • Food insecurity:     Worry: Not on file     Inability: Not on file   • Transportation needs:     Medical: Not on file     Non-medical: Not on file   Tobacco Use   • Smoking status: Former Smoker     Packs/day: 0.00     Last attempt to quit: 1981     Years since quittin.9   • Smokeless tobacco: Never Used   Substance and Sexual Activity   • Alcohol use: No   • Drug use: No   • Sexual activity: Not on file   Lifestyle   • Physical activity:     Days per week: Not on file     Minutes per session: Not on file   • Stress: Not on file   Relationships   • Social connections:     Talks on phone: Not on file     Gets together: Not on file     Attends [employer] service: Not on file     Active member of club or organization: Not on file     Attends meetings of clubs or organizations: Not on file     Relationship status: Not on file   • Intimate partner violence:     Fear of current or ex partner: Not on file     Emotionally abused: Not on file     Physically abused: Not on file     Forced sexual activity: Not on file   Other Topics Concern   • Not on file   Social History Narrative   • Not on file        MEDICATIONS:   Current Facility-Administered Medications   Medication Dose Route Frequency Provider Last Rate Last Dose   • MD Alert...Vancomycin per Pharmacy   Other PHARMACY TO DOSE Scottie Bansal M.D.       • ampicillin/sulbactam (UNASYN) 3 g in  mL IVPB  3 g Intravenous Q6HRS Scottie NICHOLS  TIM Bansal       • lactated ringers infusion  1,000 mL Intravenous Continuous Yusuf Nassar M.D. 83 mL/hr at 12/19/19 2358 1,000 mL at 12/19/19 2358   • senna-docusate (PERICOLACE or SENOKOT S) 8.6-50 MG per tablet 2 Tab  2 Tab Oral BID Yusuf Nassar M.D.   2 Tab at 12/20/19 0516    And   • polyethylene glycol/lytes (MIRALAX) PACKET 1 Packet  1 Packet Oral QDAY PRN Yusuf Nassar M.D.        And   • magnesium hydroxide (MILK OF MAGNESIA) suspension 30 mL  30 mL Oral QDAY PRN Yusuf Nassar M.D.        And   • bisacodyl (DULCOLAX) suppository 10 mg  10 mg Rectal QDAY PRN Yusuf Nassar M.D.       • Respiratory Care per Protocol   Nebulization Continuous RT Yusuf Nassar M.D.       • acetaminophen (TYLENOL) tablet 650 mg  650 mg Oral Q6HRS PRN Yusuf Nassar M.D.   650 mg at 12/19/19 0904   • labetalol (NORMODYNE/TRANDATE) injection 10 mg  10 mg Intravenous Q4HRS PRN Yusuf Nassar M.D.       • allopurinol (ZYLOPRIM) tablet 100 mg  100 mg Oral DAILY Yusuf Nassar M.D.   100 mg at 12/20/19 0516   • tamsulosin (FLOMAX) capsule 0.4 mg  0.4 mg Oral AFTER BREAKFAST Yusuf Nassar M.D.   0.4 mg at 12/20/19 1105   • MD Alert...Warfarin per Pharmacy   Other PHARMACY TO DOSE Yusuf Nassar M.D.       • insulin regular (HUMULIN R) injection 2-9 Units  2-9 Units Subcutaneous 4X/DAY ACHS Yusuf Nassar M.D.   Stopped at 12/19/19 0755    And   • glucose 4 g chewable tablet 16 g  16 g Oral Q15 MIN PRN Yusuf Nassar M.D.        And   • DEXTROSE 10% BOLUS 250 mL  250 mL Intravenous Q15 MIN PRN Yusuf Nassar M.D.       • tramadol (ULTRAM) 50 MG tablet 50 mg  50 mg Oral Q6HRS PRN Scottie Bansal M.D.   50 mg at 12/20/19 0448   • HYDROcodone-acetaminophen (NORCO) 5-325 MG per tablet 1-2 Tab  1-2 Tab Oral Q6HRS PRN Scottie Bansal M.D.   2 Tab at 12/20/19 0932   • metoprolol (LOPRESSOR) tablet 50 mg  50 mg Oral BID Scottie Bansal M.D.   50 mg at 12/20/19 0516   • levalbuterol (XOPENEX) 0.63 MG/3ML nebulizer solution 0.63 mg   "0.63 mg Nebulization Q4H PRN (RT) Scottie Bansal M.D.   0.63 mg at 12/19/19 1457   • prochlorperazine (COMPAZINE) tablet 10 mg  10 mg Oral Q8HRS PRN Scottie Bansal M.D.   10 mg at 12/20/19 0635   • levalbuterol (XOPENEX) 1.25 MG/3ML nebulizer solution 1.25 mg  1.25 mg Nebulization Q4HRS (RT) Archie Shah M.D.   1.25 mg at 12/20/19 1045          PHYSICAL EXAMINATION:PE:      /102   Pulse 97   Temp 36.3 °C (97.4 °F) (Temporal)   Resp 16   Ht 1.778 m (5' 10\")   Wt 124.6 kg (274 lb 11.1 oz)   SpO2 95%   BMI 39.41 kg/m²        Constitutional: Patient is oriented to person, place, and time.  he appears well-developed and well-nourished. No distress  Eyes: Conjunctivae normal and EOM are normal. Pupils are equal, round, and reactive to light.   Mouth/Throat: Lips without lesions, good dentition, oropharynx is clear and moist.  Neck: Trachea midline. Normal range of motion. Neck supple. No masses  Cardiovascular: Normal rate, regular rhythm, normal heart sounds and intact distal pulses. No murmur, gallop, or friction rub. No edema.  Pulmonary/Chest: No respiratory distress. Unlabored respiratory effort, lungs clear to auscultation. No wheezes or rales.   Abdominal: Soft, non tender. BS + x 4. No masses or hepatosplenomegaly.   Musculoskeletal: Extremely tender right knee, hot, increased redness. Normal range of motion. No open wound on his toes, normal range of motion.  Neurological:  he is alert and oriented to person, place, and time. No cranial nerve deficit. Coordination normal.   Skin: Right shin erythema on the anterior right lower extremity around shin area marked with black marker, almost resolved.  No open wound, port of entry, draining pus.  Psychiatric:  he has a normal mood and affect.  him behavior is normal.        ASSESSMENT AND PLAN:   64 years old male admitted for right lower extremity cellulitis on anterior shin area, was started on broad spectrum antibiotics,  CT scan was done for " increased pain which showed features suggesting osteomyelitis affecting the right second and fourth toes.  Clinically patient doesn't have any symptoms suggesting osteo, he does not remember any open trauma/wound/ulcer affecting the toes.  No known IV drug use, blood culture negative so far. Imaging finding is nonspecific, clinically doesnot fit with osteo picture. Don't think he has osteomyelitis, no indication for treatment.    - RLE redness and tenderness is likely 2/2 another gout attack based on physical exam. Also he has been on broad spectrum abx since admission, his pain seems unchanged. Recommend Knee Xray and/ or joint tap to look for Gout attack.  - Might have some superimposed skin infection, can continue PO Keflex for total of 7 days.       Thank you for your consult.

## 2019-12-21 ENCOUNTER — APPOINTMENT (OUTPATIENT)
Dept: RADIOLOGY | Facility: MEDICAL CENTER | Age: 64
DRG: 554 | End: 2019-12-21
Attending: STUDENT IN AN ORGANIZED HEALTH CARE EDUCATION/TRAINING PROGRAM
Payer: MEDICARE

## 2019-12-21 LAB
GLUCOSE BLD-MCNC: 153 MG/DL (ref 65–99)
GLUCOSE BLD-MCNC: 160 MG/DL (ref 65–99)
GLUCOSE BLD-MCNC: 77 MG/DL (ref 65–99)
INR PPP: 2.64 (ref 0.87–1.13)
PROTHROMBIN TIME: 29.2 SEC (ref 12–14.6)

## 2019-12-21 PROCEDURE — 700111 HCHG RX REV CODE 636 W/ 250 OVERRIDE (IP): Performed by: STUDENT IN AN ORGANIZED HEALTH CARE EDUCATION/TRAINING PROGRAM

## 2019-12-21 PROCEDURE — 700105 HCHG RX REV CODE 258: Performed by: STUDENT IN AN ORGANIZED HEALTH CARE EDUCATION/TRAINING PROGRAM

## 2019-12-21 PROCEDURE — A9270 NON-COVERED ITEM OR SERVICE: HCPCS | Performed by: STUDENT IN AN ORGANIZED HEALTH CARE EDUCATION/TRAINING PROGRAM

## 2019-12-21 PROCEDURE — 700101 HCHG RX REV CODE 250: Performed by: STUDENT IN AN ORGANIZED HEALTH CARE EDUCATION/TRAINING PROGRAM

## 2019-12-21 PROCEDURE — 770020 HCHG ROOM/CARE - TELE (206)

## 2019-12-21 PROCEDURE — 99232 SBSQ HOSP IP/OBS MODERATE 35: CPT | Mod: GC | Performed by: INTERNAL MEDICINE

## 2019-12-21 PROCEDURE — 85610 PROTHROMBIN TIME: CPT

## 2019-12-21 PROCEDURE — 700102 HCHG RX REV CODE 250 W/ 637 OVERRIDE(OP): Performed by: STUDENT IN AN ORGANIZED HEALTH CARE EDUCATION/TRAINING PROGRAM

## 2019-12-21 PROCEDURE — 73564 X-RAY EXAM KNEE 4 OR MORE: CPT | Mod: RT

## 2019-12-21 PROCEDURE — 82962 GLUCOSE BLOOD TEST: CPT

## 2019-12-21 PROCEDURE — 96375 TX/PRO/DX INJ NEW DRUG ADDON: CPT

## 2019-12-21 RX ORDER — PREDNISONE 20 MG/1
40 TABLET ORAL DAILY
Status: DISCONTINUED | OUTPATIENT
Start: 2019-12-21 | End: 2019-12-22 | Stop reason: HOSPADM

## 2019-12-21 RX ORDER — METOPROLOL TARTRATE 1 MG/ML
5 INJECTION, SOLUTION INTRAVENOUS
Status: DISCONTINUED | OUTPATIENT
Start: 2019-12-21 | End: 2019-12-22 | Stop reason: HOSPADM

## 2019-12-21 RX ORDER — METOPROLOL TARTRATE 1 MG/ML
5 INJECTION, SOLUTION INTRAVENOUS
Status: DISCONTINUED | OUTPATIENT
Start: 2019-12-21 | End: 2019-12-21

## 2019-12-21 RX ORDER — METOPROLOL TARTRATE 1 MG/ML
5 INJECTION, SOLUTION INTRAVENOUS ONCE
Status: COMPLETED | OUTPATIENT
Start: 2019-12-21 | End: 2019-12-21

## 2019-12-21 RX ADMIN — CEPHALEXIN 500 MG: 500 CAPSULE ORAL at 23:56

## 2019-12-21 RX ADMIN — METOPROLOL TARTRATE 5 MG: 1 INJECTION, SOLUTION INTRAVENOUS at 06:18

## 2019-12-21 RX ADMIN — CEPHALEXIN 500 MG: 500 CAPSULE ORAL at 13:07

## 2019-12-21 RX ADMIN — CEPHALEXIN 500 MG: 500 CAPSULE ORAL at 17:12

## 2019-12-21 RX ADMIN — SODIUM CHLORIDE, POTASSIUM CHLORIDE, SODIUM LACTATE AND CALCIUM CHLORIDE 1000 ML: 600; 310; 30; 20 INJECTION, SOLUTION INTRAVENOUS at 21:06

## 2019-12-21 RX ADMIN — INSULIN HUMAN 2 UNITS: 100 INJECTION, SOLUTION PARENTERAL at 20:44

## 2019-12-21 RX ADMIN — TRAMADOL HYDROCHLORIDE 50 MG: 50 TABLET, FILM COATED ORAL at 04:01

## 2019-12-21 RX ADMIN — SODIUM CHLORIDE, POTASSIUM CHLORIDE, SODIUM LACTATE AND CALCIUM CHLORIDE 1000 ML: 600; 310; 30; 20 INJECTION, SOLUTION INTRAVENOUS at 10:09

## 2019-12-21 RX ADMIN — INSULIN HUMAN 2 UNITS: 100 INJECTION, SOLUTION PARENTERAL at 17:17

## 2019-12-21 RX ADMIN — METOPROLOL TARTRATE 75 MG: 50 TABLET, FILM COATED ORAL at 17:12

## 2019-12-21 RX ADMIN — HYDROCODONE BITARTRATE AND ACETAMINOPHEN 2 TABLET: 5; 325 TABLET ORAL at 14:40

## 2019-12-21 RX ADMIN — CEPHALEXIN 500 MG: 500 CAPSULE ORAL at 08:36

## 2019-12-21 RX ADMIN — METOPROLOL TARTRATE 50 MG: 25 TABLET, FILM COATED ORAL at 05:23

## 2019-12-21 RX ADMIN — HYDROCODONE BITARTRATE AND ACETAMINOPHEN 2 TABLET: 5; 325 TABLET ORAL at 20:47

## 2019-12-21 RX ADMIN — CEPHALEXIN 500 MG: 500 CAPSULE ORAL at 01:24

## 2019-12-21 RX ADMIN — TRAMADOL HYDROCHLORIDE 50 MG: 50 TABLET, FILM COATED ORAL at 11:16

## 2019-12-21 RX ADMIN — HYDROCODONE BITARTRATE AND ACETAMINOPHEN 2 TABLET: 5; 325 TABLET ORAL at 08:37

## 2019-12-21 RX ADMIN — TRAMADOL HYDROCHLORIDE 50 MG: 50 TABLET, FILM COATED ORAL at 17:12

## 2019-12-21 RX ADMIN — METOPROLOL TARTRATE 5 MG: 5 INJECTION, SOLUTION INTRAVENOUS at 16:17

## 2019-12-21 RX ADMIN — TAMSULOSIN HYDROCHLORIDE 0.4 MG: 0.4 CAPSULE ORAL at 08:37

## 2019-12-21 RX ADMIN — ALLOPURINOL 100 MG: 100 TABLET ORAL at 05:23

## 2019-12-21 RX ADMIN — PREDNISONE 40 MG: 20 TABLET ORAL at 11:14

## 2019-12-21 RX ADMIN — WARFARIN SODIUM 5 MG: 5 TABLET ORAL at 17:13

## 2019-12-21 ASSESSMENT — ENCOUNTER SYMPTOMS
FEVER: 0
ABDOMINAL PAIN: 0
DIARRHEA: 0
SHORTNESS OF BREATH: 0
HEADACHES: 0
CONSTIPATION: 0
MYALGIAS: 0
DIZZINESS: 0
BRUISES/BLEEDS EASILY: 1
NAUSEA: 0
CHILLS: 0
WHEEZING: 0
BLURRED VISION: 0
VOMITING: 0
PALPITATIONS: 1

## 2019-12-21 NOTE — PROGRESS NOTES
At start of shift, pt had HR in the 150's. Pt received dose of metoprolol prior to 1900 around 1850. Around 0820, pt HR decreased to the 90's. A short time later after 2100, RN noticed that pt HR was back in the 130's to 140's. Pt asymptomatic. MD Ibrahim made aware. No new orders at that time. Pt was treated with Tramadol for his pain in his right leg at 2111. Around 2300, pt was noted to be sleeping and still at HR in 130's. As RN was about to page MD, RN noted that pt's HR had decreased back into the 90's. Will continue to monitor.

## 2019-12-21 NOTE — PROGRESS NOTES
Inpatient Anticoagulation Service Note    Date: 12/21/2019    Reason for Anticoagulation: Deep Vein Thrombosis, Atrial Fibrillation, Bioprosthetic Valve Replacement   Target INR: 2.0 to 3.0  [id number] VASc Score: 3  HAS-BLED Score: 1   Hemoglobin Value: (!) 12.7  Hematocrit Value: (!) 38.7  Lab Platelet Value: (!) 154    INR from last 7 days     Date/Time INR Value    12/21/19 0405  (!) 2.64    12/20/19 0315  (!) 3.07    12/19/19 0333  (!) 3.02        Dose from last 7 days     Date/Time Dose (mg)    12/21/19 1544  5    12/20/19 1710  5    12/19/19 0946  2.5        Average Dose (mg): (Home dose: 7 mg on Wed and 5 mg AOD)  Significant Interactions: Antibiotics, Corticosteroids      Reversal Agent Administered: Not Applicable    Comments: Patient is INR therapeutic for goal 2 - 3 for history of afib. Prior to hospital arrival patient was taking warfarin 7 mg Wednesday and 5 mg all other days.  Started on 5 day course of cephalexin (12/20-12/25) and prednisone (12/21-12/26) which may increase sensitivity to warfarin. Diet ordered. H/H stable no overt s/sx of bleeding.       Plan:Continue warfarin 5 mg once daily and adjust according to INR results. INR in AM.        Education Material Provided?: No    Pharmacist suggested discharge dosing: Would try warfarin 5 mg PO daily with follow up INR within 96 hours     Albania Haq Pharm.D., BCPS

## 2019-12-21 NOTE — PROGRESS NOTES
Assumed pt care at 0700. Received report from Cirilo AKERS. A&O x4. Pt complains of 7/10 R knee pain, medicated per MAR. Mild WOB, on RA, IS at bedside, encouraged pt to use. Cardiac monitor applied, afib/partially paced.  Updated on POC, communication board updated. Bed locked and in lowest position. Call light and belongings within reach. Non-skid socks in place. Needs met, will continue to monitor.

## 2019-12-21 NOTE — PROGRESS NOTES
Pt noted to be tachycardic again, with HR sustaining in 130s. PRN metoprolol Dc'd by pharmacy, scheduled dose not due until 1800. Attempted vasovagal maneuvers x2 with pt, not successful. Medicated for pain per MAR, HR still in 130s 20 mins after admin. [facility] paged to update.

## 2019-12-21 NOTE — PROGRESS NOTES
Inpatient Anticoagulation Service Note    Date: 12/20/2019    Reason for Anticoagulation: Deep Vein Thrombosis, Atrial Fibrillation, Bioprosthetic Valve Replacement   Target INR: 2.0 to 3.0  [id number] VASc Score: 3  HAS-BLED Score: 1   Hemoglobin Value: (!) 12.7  Hematocrit Value: (!) 38.7  Lab Platelet Value: (!) 154    INR from last 7 days     Date/Time INR Value    12/20/19 0315  (!) 3.07    12/19/19 0333  (!) 3.02        Dose from last 7 days     Date/Time Dose (mg)    12/20/19 1710  5    12/19/19 0946  2.5        Average Dose (mg): (Home dose: 7 mg on Wed and 5 mg AOD)  Significant Interactions: Antibiotics  Bridge Therapy: No    Comments: INR remains slightly above goal range, lower than normal home dose given yesterday, expect INR to trend down tomorrow. Will start 5 mg PO daily for now and trend INR while in house. INR in AM.    Education Material Provided?: No (chronic therapy)  Pharmacist suggested discharge dosing: Would try warfarin 5 mg PO daily with follow up INR within 96 hours     Kyaw Costello, PharmD, BCPS

## 2019-12-21 NOTE — PROGRESS NOTES
Around 0200, this RN helped pt use the urinal. PT'S hr WAS IN THE 90'S AT THIS POINT, however upon activity, the pt's HR jumped in the 140's. Pt was noted to be asymptomatic. RN monitored pt to see if HR would come down once pt was at rest. HR came down slightly to the 120's to low 130's. Bp was taken and noted to be 138/97. MD Ibrahim paged and made aware of this. No new orders at this point, however per MD, he would like to be paged if pt's HR begins to sustain above 140. Rate currently at 128. Will monitor.

## 2019-12-21 NOTE — PROGRESS NOTES
Internal Medicine Interval Note  Note Author: Scottie Bansal M.D.     Name Morales Olivier     1955   Age/Sex 64 y.o. male   MRN [id number]   Code Status Full Code     After 5PM or if no immediate response to page, please call for cross-coverage  Attending/Team: Dr. Shah/Wes See Patient List for primary contact information  Call [phone] to page    1st Call - Day Intern (R1):   Scottie Bansal 2nd Call - Day Sr. Resident (R2/R3):   Mason Stevenson         Reason for interval visit  (Principal Problem)     Gout      Interval Problem Daily Status Update  (24 hours, problem oriented, brief subjective history, new lab/imaging data pertinent to that problem)     Mr. Morales Olivier is a 64-year-old man with  atrial fibrillation (rate-controlled with metoprolol and on warfarin anticoagulation), sick sinus syndrome status-post pacemaker, hypertension, diabetes mellitus, mitral valve regurgitation status-post bovine valve replacement, and a history of DVT who presented on 2019 with erythema, pain, and edema of the right lower leg of four day duration. The patient was tachycardic to the 110s BPM on presentation, but exhibited no other signs of systemic infection. DVT studies and x-rays obtained on the day of presentation were unremarkable. A CT scan obtained on 2019 showed evidence of osteomyelitis affecting the right second and fourth toes. However, per infectious disease, the symptoms are far more consistent with an acute gout flare affecting the right knee with overlying cellulitis.    - Mr. Olivier remains persistently tachycardic to the 120s-140s BPM overnight. Given one 5 mg IV push metoprolol. Increased PO metoprolol dose to 75 mg BID. Most likely due to pain. Will continue aggressive analgesia with tramadol and Norco.  - Per infectious disease, the patient most likely has right knee gout with overlying cellulitis. Recommend Keflex and gout management. Right knee x-ray ordered. Patient  adamantly refuses intraarticular glucocorticoids, so will start prednisone 40 mg PO x 5 days due to underlying renal insufficiency.   - Patient endorses improved pain control and right leg range of motion today. No new symptoms. Denies fevers, chills, chest pain, nausea, vomiting, and shortness of breath. Occasional heart palpitations, but these are chronic.  - Discontinued levalbuterol, as patient has been refusing and has not had dyspnea or wheezing on physical examination.  - Likely discharge tomorrow, 12/22/2019, if pain is well-controlled and patient is able to ambulate without difficulty.    Review of Systems   Constitutional: Negative for chills and fever.   Eyes: Negative for blurred vision.   Respiratory: Negative for shortness of breath and wheezing.    Cardiovascular: Positive for palpitations and leg swelling (right lower leg). Negative for chest pain.   Gastrointestinal: Negative for abdominal pain, constipation, diarrhea, nausea and vomiting.   Musculoskeletal: Negative for myalgias.   Skin: Positive for rash (cellulitis).   Neurological: Negative for dizziness and headaches.   Endo/Heme/Allergies: Bruises/bleeds easily.       Disposition/Barriers to discharge:   None    Consultants/Specialty  Infectious disease: Dr. Lyles  PCP: Pcp Pt States None      Quality Measures  Quality-Core Measures        Physical Exam       Vitals:    12/21/19 0521 12/21/19 0616 12/21/19 0812 12/21/19 0912   BP: 127/96 119/87 111/78    Pulse: (!) 140 (!) 124 79 79   Resp:   14    Temp:   36.1 °C (96.9 °F)    TempSrc:   Temporal    SpO2:       Weight:       Height:         Body mass index is 39.41 kg/m².    Oxygen Therapy:  Pulse Oximetry: 92 %, O2 Delivery: None (Room Air)    Physical Exam  Constitutional: He is oriented to person, place, and time.   HENT:   Head: Normocephalic and atraumatic.   Right Ear: External ear normal.   Left Ear: External ear normal.   Eyes: Conjunctivae and EOM are normal. Right eye exhibits no  "discharge. Left eye exhibits no discharge.   Neck: Normal range of motion. No JVD present.   Cardiovascular: Normal rate, normal heart sounds and intact distal pulses. Exam reveals no gallop and no friction rub.   No murmur heard.  Diminished R pedal pulses  Pulmonary/Chest: Effort normal. No stridor. No respiratory distress. He has wheezes (unchanged). He has no rales. He exhibits no tenderness.   Abdominal: Soft. Bowel sounds are normal. He exhibits no distension and no mass. There is no tenderness. There is no rebound and no guarding.   Musculoskeletal:         General: Tenderness (right lower leg) and edema (right lower leg) present.      Comments: ROM of right leg limited by pain   Neurological: He is alert and oriented to person, place, and time.   Skin: Rash (poorly-demarcated erythematous region on anterior surface of right lower leg, improving) noted.   Psychiatric: Mood, memory, affect and judgment normal.        Assessment/Plan     * Gout  Assessment & Plan  Mr. Olivier presented with symptoms consistent with cellulitis affecting the right leg. A CT scan revealed findings suggestive of osteomyelitis of the right second and fourth toes. However, correlation of the patient's clinical history of physical examination findings were used to establish a diagnosis of gout of the right knee with overlying cellulitis.    - Right knee x-ray ordered, pending  - Continue home allopurinol  - Patient adamantly refuses intraarticular glucocorticoids and has impaired renal function. We will therefore start prednisone 40 mg x 5 days.   - Norco and tramadol for pain  - Discharge with colchicine as needed for gout flares    Cellulitis  Assessment & Plan  Please see assessment and plan for \"osteomyelitis.\"      Deep vein thrombosis (HCC) [I82.409]- (present on admission)  Assessment & Plan  History of DVT without PE per patient  Ultrasound of bilateral lower extremities did not show any acute DVTs  Patient is already on " anticoagulation with INR of 3 in ED  Warfarin per pharmacy with goal INR mentioned above    A-fib (HCC)- (present on admission)  Assessment & Plan  History of A. fib  On anticoagulation for MVR  INR 3 today  On metoprolol 25 twice daily at home  Patient went into A. fib with RVR heart rate into the 130s in ED.  Received 10 mg of diltiazem  Heart rate in 100s    Plan  Continue metoprolol 75 mg PO BID  Metoprolol 5 mg IV push for HR > 120 BPM  Pharmacy for goal INR of 2-3.  Patient does report his INR supposed to be 2.5-3.5.  Day team to clarify as patient has bovine valve.  Reportedly, at some point he did have porcine valve which was replaced with bovine  Recent echo 2 months ago showed EF of 55%.  Patient does have elevated RV pressure at 50      Gross hematuria- (present on admission)  Assessment & Plan  Discharged on 12/16/2019 for hematuria secondary to nephrolithiasis right kidney, measuring at 8 mm    Resolved  Patient denies any hematuria, flank pain, nausea, vomiting      Osteomyelitis ([facility])  Assessment & Plan  Mr. Olivier is a 64-year-old man with a history of atrial fibrillation, MVR s/p bovine valve replacement, SSS s/p pacemaker, and gout who presented on 12/19/2019 with erythema, pain, and edema of the right lower leg of four day duration. He was initially diagnosed with cellulitis and started on doxycycline for MRSA coverage and Unasyn for anaerobe/Gram-negative coverage. CT scan obtained on 12/20/2019 showed signs of osteomyelitis affecting the right second and fourth toes. However, per infectious disease, the symptoms are far more suggestive of an acute gout flare affecting the right knee with overlying cellulitis.    - Keflex PO for cellulitis  - Gout treatment as above  - Norco and tramadol for pain  - Compazine for nausea   - Monitor use and QTc  - MARJORIE unremarkable    Chest x-ray abnormality  Assessment & Plan  Chest x-ray showed a small infrahilar infiltrate. Patient denies fevers or dyspnea.    -  Two-view CXR showed mild interstitial opacities consistent with atelectasis/mild edema  - Continue to monitor    COPD exacerbation (HCC)- (present on admission)  Assessment & Plan  - Mild per patient  - Lungs clear bilaterally to auscultation on physical examination  - Avoiding albuterol due to tachycardia  - Patient refuses levalbuterol  - Continue to monitor      Type 2 diabetes mellitus with complication, without long-term current use of insulin (HCC)- (present on admission)  Assessment & Plan  - ISS    Benign prostatic hyperplasia without lower urinary tract symptoms- (present on admission)  Assessment & Plan  - Continue home tamsulosin

## 2019-12-22 VITALS
DIASTOLIC BLOOD PRESSURE: 101 MMHG | HEART RATE: 81 BPM | TEMPERATURE: 97.8 F | OXYGEN SATURATION: 99 % | WEIGHT: 282.63 LBS | BODY MASS INDEX: 40.46 KG/M2 | SYSTOLIC BLOOD PRESSURE: 136 MMHG | HEIGHT: 70 IN | RESPIRATION RATE: 20 BRPM

## 2019-12-22 PROBLEM — R31.0 GROSS HEMATURIA: Status: RESOLVED | Noted: 2019-12-15 | Resolved: 2019-12-22

## 2019-12-22 LAB
GLUCOSE BLD-MCNC: 109 MG/DL (ref 65–99)
INR PPP: 2.45 (ref 0.87–1.13)
PROTHROMBIN TIME: 27.5 SEC (ref 12–14.6)

## 2019-12-22 PROCEDURE — 85610 PROTHROMBIN TIME: CPT

## 2019-12-22 PROCEDURE — 700102 HCHG RX REV CODE 250 W/ 637 OVERRIDE(OP): Performed by: STUDENT IN AN ORGANIZED HEALTH CARE EDUCATION/TRAINING PROGRAM

## 2019-12-22 PROCEDURE — 700111 HCHG RX REV CODE 636 W/ 250 OVERRIDE (IP): Performed by: STUDENT IN AN ORGANIZED HEALTH CARE EDUCATION/TRAINING PROGRAM

## 2019-12-22 PROCEDURE — 82962 GLUCOSE BLOOD TEST: CPT

## 2019-12-22 PROCEDURE — 302128 INFUSION PUMP: Performed by: INTERNAL MEDICINE

## 2019-12-22 PROCEDURE — A9270 NON-COVERED ITEM OR SERVICE: HCPCS | Performed by: STUDENT IN AN ORGANIZED HEALTH CARE EDUCATION/TRAINING PROGRAM

## 2019-12-22 PROCEDURE — 99239 HOSP IP/OBS DSCHRG MGMT >30: CPT | Mod: GC | Performed by: INTERNAL MEDICINE

## 2019-12-22 PROCEDURE — 36415 COLL VENOUS BLD VENIPUNCTURE: CPT

## 2019-12-22 RX ORDER — HYDROCODONE BITARTRATE AND ACETAMINOPHEN 5; 325 MG/1; MG/1
1-2 TABLET ORAL EVERY 6 HOURS PRN
Qty: 20 TAB | Refills: 0 | Status: SHIPPED | OUTPATIENT
Start: 2019-12-22 | End: 2019-12-27

## 2019-12-22 RX ORDER — CEPHALEXIN 500 MG/1
500 CAPSULE ORAL EVERY 6 HOURS
Qty: 12 CAP | Refills: 0 | Status: SHIPPED | OUTPATIENT
Start: 2019-12-22 | End: 2019-12-25

## 2019-12-22 RX ORDER — METOPROLOL SUCCINATE 50 MG/1
50 TABLET, EXTENDED RELEASE ORAL
Status: DISCONTINUED | OUTPATIENT
Start: 2019-12-23 | End: 2019-12-22 | Stop reason: HOSPADM

## 2019-12-22 RX ORDER — COLCHICINE 0.6 MG/1
TABLET ORAL
Qty: 30 TAB | Refills: 0 | Status: SHIPPED | OUTPATIENT
Start: 2019-12-22 | End: 2020-02-28

## 2019-12-22 RX ORDER — METOPROLOL SUCCINATE 50 MG/1
50 TABLET, EXTENDED RELEASE ORAL DAILY
Qty: 30 TAB | Refills: 0 | Status: SHIPPED | OUTPATIENT
Start: 2019-12-22 | End: 2020-01-09 | Stop reason: SDUPTHER

## 2019-12-22 RX ORDER — TRAMADOL HYDROCHLORIDE 50 MG/1
50 TABLET ORAL EVERY 6 HOURS PRN
Qty: 30 TAB | Refills: 0 | Status: SHIPPED | OUTPATIENT
Start: 2019-12-22 | End: 2019-12-27

## 2019-12-22 RX ORDER — PREDNISONE 20 MG/1
TABLET ORAL
Qty: 6 TAB | Refills: 0 | Status: SHIPPED | OUTPATIENT
Start: 2019-12-23 | End: 2019-12-26

## 2019-12-22 RX ADMIN — TRAMADOL HYDROCHLORIDE 50 MG: 50 TABLET, FILM COATED ORAL at 00:03

## 2019-12-22 RX ADMIN — CEPHALEXIN 500 MG: 500 CAPSULE ORAL at 04:39

## 2019-12-22 RX ADMIN — ALLOPURINOL 100 MG: 100 TABLET ORAL at 04:40

## 2019-12-22 RX ADMIN — PREDNISONE 40 MG: 20 TABLET ORAL at 04:40

## 2019-12-22 RX ADMIN — SENNOSIDES AND DOCUSATE SODIUM 2 TABLET: 8.6; 5 TABLET ORAL at 04:40

## 2019-12-22 RX ADMIN — METOPROLOL TARTRATE 75 MG: 50 TABLET, FILM COATED ORAL at 04:40

## 2019-12-22 RX ADMIN — HYDROCODONE BITARTRATE AND ACETAMINOPHEN 2 TABLET: 5; 325 TABLET ORAL at 04:40

## 2019-12-22 RX ADMIN — ACETAMINOPHEN 650 MG: 325 TABLET, FILM COATED ORAL at 00:03

## 2019-12-22 RX ADMIN — TAMSULOSIN HYDROCHLORIDE 0.4 MG: 0.4 CAPSULE ORAL at 04:40

## 2019-12-22 ASSESSMENT — ENCOUNTER SYMPTOMS
NAUSEA: 0
VOMITING: 0
DIZZINESS: 0
FEVER: 0
BLURRED VISION: 0
HEADACHES: 0
SHORTNESS OF BREATH: 0
PALPITATIONS: 1
WHEEZING: 1
CHILLS: 0
DIARRHEA: 0
ABDOMINAL PAIN: 0
COUGH: 0
CONSTIPATION: 0

## 2019-12-22 NOTE — PROGRESS NOTES
Pt received from CDU. Tele monitor in place. VSS. Pt oriented to room. Educated on use of the call light. Pt demonstrated use of the call light. Discussed POC. All questions answered.  RN transferred pt on Zoll, given chart and medications. Report received.

## 2019-12-22 NOTE — PROGRESS NOTES
[facility] returned page, to place orders.    Pt transferred to T827-2 with RN on zoll monitor, pt states all belongings in his possession. RN to bedside on arrival, given chart and medications.

## 2019-12-22 NOTE — PROGRESS NOTES
Skin check complete w/ RN.   Scattered scratches throughout lower and upper extremities as well as backside.   Skin pink and blanching throughout.   No pressure ulcers found.

## 2019-12-22 NOTE — DISCHARGE PLANNING
· This RNCM had long discussion with Pt regarding transportation and medication copays  · Per pt he has $2.00 until he receives his social security on the 3rd of January   · Pt has no ride home and no money to pay for ride  · Taxi voucher filled out and handed to bedside RN   · This RNCM spoke to Dr. Bansal regarding prescription need   · Per MD, Pt already has his Metoprolol and Coumadin at home and those are the most important medications for him to take  · Per MD, Pt okay to discharge without new medications and Pt can fill them when he is able  · Pt updated, all questions answered

## 2019-12-22 NOTE — PROGRESS NOTES
Pt was discharged to home with self. Lines removed. Vital signs stable. Tele box removed. Discharge instructions reviewed and understood. All questions answered. All personal possessions with patient.

## 2019-12-22 NOTE — DIETARY
NUTRITION SERVICES: BMI - Pt with BMI >40 (=Body mass index is 40.55 kg/m².), morbid obesity. Weight taken via stand up scale and pt is currently -1.4 L fluids per I/O flowsheet. Weight loss counseling not appropriate in acute care setting. RECOMMEND - Referral to outpatient nutrition services for weight management after D/C.

## 2019-12-22 NOTE — DISCHARGE INSTRUCTIONS
Please STOP taking the metoprolol 25 mg twice daily and START taking the Toprol XL 50 mg once daily    Discharge Instructions    Discharged to home by car with relative. Discharged via wheelchair, hospital escort: Yes.  Special equipment needed: Not Applicable    Be sure to schedule a follow-up appointment with your primary care doctor or any specialists as instructed.     Discharge Plan:   Diet Plan: Discussed  Activity Level: Discussed  Confirmed Follow up Appointment: Patient to Call and Schedule Appointment  Confirmed Symptoms Management: Discussed  Medication Reconciliation Updated: Yes  Influenza Vaccine Indication: Not indicated: Previously immunized this influenza season and > 8 years of age(10/30[birth year]19)    I understand that a diet low in cholesterol, fat, and sodium is recommended for good health. Unless I have been given specific instructions below for another diet, I accept this instruction as my diet prescription.   Other diet: Cardiac/Diabetic    Special Instructions: None    · Is patient discharged on Warfarin / Coumadin?   Yes    You are receiving the drug warfarin. Please understand the importance of monitoring warfarin with scheduled PT/INR blood draws.  Follow-up with a call to your personal Doctor's office in 3 days to schedule a PT/INR. .    IMPORTANT: HOW TO USE THIS INFORMATION:  This is a summary and does NOT have all possible information about this product. This information does not assure that this product is safe, effective, or appropriate for you. This information is not individual medical advice and does not substitute for the advice of your health care professional. Always ask your health care professional for complete information about this product and your specific health needs.      WARFARIN - ORAL (WARF-uh-rin)      COMMON BRAND NAME(S): Coumadin      WARNING:  Warfarin can cause very serious (possibly fatal) bleeding. This is more likely to occur when you first start taking this  "medication or if you take too much warfarin. To decrease your risk for bleeding, your doctor or other health care provider will monitor you closely and check your lab results (INR test) to make sure you are not taking too much warfarin. Keep all medical and laboratory appointments. Tell your doctor right away if you notice any signs of serious bleeding. See also Side Effects section.      USES:  This medication is used to treat blood clots (such as in deep vein thrombosis-DVT or pulmonary embolus-PE) and/or to prevent new clots from forming in your body. Preventing harmful blood clots helps to reduce the risk of a stroke or heart attack. Conditions that increase your risk of developing blood clots include a certain type of irregular heart rhythm (atrial fibrillation), heart valve replacement, recent heart attack, and certain surgeries (such as hip/knee replacement). Warfarin is commonly called a \"blood thinner,\" but the more correct term is \"anticoagulant.\" It helps to keep blood flowing smoothly in your body by decreasing the amount of certain substances (clotting proteins) in your blood.      HOW TO USE:  Read the Medication Guide provided by your pharmacist before you start taking warfarin and each time you get a refill. If you have any questions, ask your doctor or pharmacist. Take this medication by mouth with or without food as directed by your doctor or other health care professional, usually once a day. It is very important to take it exactly as directed. Do not increase the dose, take it more frequently, or stop using it unless directed by your doctor. Dosage is based on your medical condition, laboratory tests (such as INR), and response to treatment. Your doctor or other health care provider will monitor you closely while you are taking this medication to determine the right dose for you. Use this medication regularly to get the most benefit from it. To help you remember, take it at the same time each " day. It is important to eat a balanced, consistent diet while taking warfarin. Some foods can affect how warfarin works in your body and may affect your treatment and dose. Avoid sudden large increases or decreases in your intake of foods high in vitamin K (such as broccoli, cauliflower, cabbage, brussels sprouts, kale, spinach, and other green leafy vegetables, liver, green tea, certain vitamin supplements). If you are trying to lose weight, check with your doctor before you try to go on a diet. Cranberry products may also affect how your warfarin works. Limit the amount of cranberry juice (16 ounces/480 milliliters a day) or other cranberry products you may drink or eat.      SIDE EFFECTS:  Nausea, loss of appetite, or stomach/abdominal pain may occur. If any of these effects persist or worsen, tell your doctor or pharmacist promptly. Remember that your doctor has prescribed this medication because he or she has judged that the benefit to you is greater than the risk of side effects. Many people using this medication do not have serious side effects. This medication can cause serious bleeding if it affects your blood clotting proteins too much (shown by unusually high INR lab results). Even if your doctor stops your medication, this risk of bleeding can continue for up to a week. Tell your doctor right away if you have any signs of serious bleeding, including: unusual pain/swelling/discomfort, unusual/easy bruising, prolonged bleeding from cuts or gums, persistent/frequent nosebleeds, unusually heavy/prolonged menstrual flow, pink/dark urine, coughing up blood, vomit that is bloody or looks like coffee grounds, severe headache, dizziness/fainting, unusual or persistent tiredness/weakness, bloody/black/tarry stools, chest pain, shortness of breath, difficulty swallowing. Tell your doctor right away if any of these unlikely but serious side effects occur: persistent nausea/vomiting, severe stomach/abdominal pain,  yellowing eyes/skin. This drug rarely has caused very serious (possibly fatal) problems if its effects lead to small blood clots (usually at the beginning of treatment). This can lead to severe skin/tissue damage that may require surgery or amputation if left untreated. Patients with certain blood conditions (protein C or S deficiency) may be at greater risk. Get medical help right away if any of these rare but serious side effects occur: painful/red/purplish patches on the skin (such as on the toe, breast, abdomen), change in the amount of urine, vision changes, confusion, slurred speech, weakness on one side of the body. A very serious allergic reaction to this drug is rare. However, get medical help right away if you notice any symptoms of a serious allergic reaction, including: rash, itching/swelling (especially of the face/tongue/throat), severe dizziness, trouble breathing. This is not a complete list of possible side effects. If you notice other effects not listed above, contact your doctor or pharmacist. In the [place] - Call your doctor for medical advice about side effects. You may report side effects to FDA at 8-220-FIT-8625. In [place] - Call your doctor for medical advice about side effects. You may report side effects to [facility] [place] at 1-130.711.6115.      PRECAUTIONS:  Before taking warfarin, tell your doctor or pharmacist if you are allergic to it; or if you have any other allergies. This product may contain inactive ingredients, which can cause allergic reactions or other problems. Talk to your pharmacist for more details. Before using this medication, tell your doctor or pharmacist your medical history, especially of: blood disorders (such as anemia, hemophilia), bleeding problems (such as bleeding of the stomach/intestines, bleeding in the brain), blood vessel disorders (such as aneurysms), recent major injury/surgery, liver disease, alcohol use, mental/mood disorders (including memory problems),  frequent falls/injuries. It is important that all your doctors and dentists know that you take warfarin. Before having surgery or any medical/dental procedures, tell your doctor or dentist that you are taking this medication and about all the products you use (including prescription drugs, nonprescription drugs, and herbal products). Avoid getting injections into the muscles. If you must have an injection into a muscle (for example, a flu shot), it should be given in the arm. This way, it will be easier to check for bleeding and/or apply pressure bandages. This medication may cause stomach bleeding. Daily use of alcohol while using this medicine will increase your risk for stomach bleeding and may also affect how this medication works. Limit or avoid alcoholic beverages. If you have not been eating well, if you have an illness or infection that causes fever, vomiting, or diarrhea for more than 2 days, or if you start using any antibiotic medications, contact your doctor or pharmacist immediately because these conditions can affect how warfarin works. This medication can cause heavy bleeding. To lower the chance of getting cut, bruised, or injured, use great caution with sharp objects like safety razors and nail cutters. Use an electric razor when shaving and a soft toothbrush when brushing your teeth. Avoid activities such as contact sports. If you fall or injure yourself, especially if you hit your head, call your doctor immediately. Your doctor may need to check you. The Food & Drug Administration has stated that generic warfarin products are interchangeable. However, consult your doctor or pharmacist before switching warfarin products. Be careful not to take more than one medication that contains warfarin unless specifically directed by the doctor or health care provider who is monitoring your warfarin treatment. Older adults may be at greater risk for bleeding while using this drug. This medication is not  "recommended for use during pregnancy because of serious (possibly fatal) harm to an unborn baby. Discuss the use of reliable forms of birth control with your doctor. If you become pregnant or think you may be pregnant, tell your doctor immediately. If you are planning pregnancy, discuss a plan for managing your condition with your doctor before you become pregnant. Your doctor may switch the type of medication you use during pregnancy. Very small amounts of this medication may pass into breast milk but is unlikely to harm a nursing infant. Consult your doctor before breast-feeding.      DRUG INTERACTIONS:  Drug interactions may change how your medications work or increase your risk for serious side effects. This document does not contain all possible drug interactions. Keep a list of all the products you use (including prescription/nonprescription drugs and herbal products) and share it with your doctor and pharmacist. Do not start, stop, or change the dosage of any medicines without your doctor's approval. Warfarin interacts with many prescription, nonprescription, vitamin, and herbal products. This includes medications that are applied to the skin or inside the vagina or rectum. The interactions with warfarin usually result in an increase or decrease in the \"blood-thinning\" (anticoagulant) effect. Your doctor or other health care professional should closely monitor you to prevent serious bleeding or clotting problems. While taking warfarin, it is very important to tell your doctor or pharmacist of any changes in medications, vitamins, or herbal products that you are taking. Some products that may interact with this drug include: capecitabine, imatinib, mifepristone. Aspirin, aspirin-like drugs (salicylates), and nonsteroidal anti-inflammatory drugs (NSAIDs such as ibuprofen, naproxen, celecoxib) may have effects similar to warfarin. These drugs may increase the risk of bleeding problems if taken during treatment " with warfarin. Carefully check all prescription/nonprescription product labels (including drugs applied to the skin such as pain-relieving creams) since the products may contain NSAIDs or salicylates. Talk to your doctor about using a different medication (such as acetaminophen) to treat pain/fever. Low-dose aspirin and related drugs (such as clopidogrel, ticlopidine) should be continued if prescribed by your doctor for specific medical reasons such as heart attack or stroke prevention. Consult your doctor or pharmacist for more details. Many herbal products interact with warfarin. Tell your doctor before taking any herbal products, especially bromelains, coenzyme Q10, cranberry, danshen, dong quai, fenugreek, garlic, ginkgo biloba, ginseng, and Thong's wort, among others. This medication may interfere with a certain laboratory test to measure theophylline levels, possibly causing false test results. Make sure laboratory personnel and all your doctors know you use this drug.      OVERDOSE:  If overdose is suspected, contact a poison control center or emergency room immediately. [place] residents can call the [place] National Poison Hotline at 1-865.503.8753. [place] residents can call a provincial poison control center. Symptoms of overdose may include: bloody/black/tarry stools, pink/dark urine, unusual/prolonged bleeding.      NOTES:  Do not share this medication with others. Laboratory and/or medical tests (such as INR, complete blood count) must be performed periodically to monitor your progress or check for side effects. Consult your doctor for more details.      MISSED DOSE:  For the best possible benefit, do not miss any doses. If you do miss a dose and remember on the same day, take it as soon as you remember. If you remember on the next day, skip the missed dose and resume your usual dosing schedule. Do not double the dose to catch up because this could increase your risk for bleeding. Keep a record of missed doses  to give to your doctor or pharmacist. Contact your doctor or pharmacist if you miss 2 or more doses in a row.      STORAGE:  Store at room temperature away from light and moisture. Do not store in the bathroom. Keep all medications away from children and pets. Do not flush medications down the toilet or pour them into a drain unless instructed to do so. Properly discard this product when it is  or no longer needed. Consult your pharmacist or local waste disposal company for more details about how to safely discard your product.      MEDICAL ALERT:  Your condition and medication can cause complications in a medical emergency. For information about enrolling in MedicAlert, call 1-981.514.7888 ([place]) or [phone] ([place]).      Information last revised 2010 Copyright(c)  [employer]       Atrial Fibrillation  Introduction  Atrial fibrillation is a type of heartbeat that is irregular or fast (rapid). If you have this condition, your heart keeps quivering in a weird (chaotic) way. This condition can make it so your heart cannot pump blood normally. Having this condition gives a person more risk for stroke, heart failure, and other heart problems. There are different types of atrial fibrillation. Talk with your doctor to learn about the type that you have.  Follow these instructions at home:  · Take over-the-counter and prescription medicines only as told by your doctor.  · If your doctor prescribed a blood-thinning medicine, take it exactly as told. Taking too much of it can cause bleeding. If you do not take enough of it, you will not have the protection that you need against stroke and other problems.  · Do not use any tobacco products. These include cigarettes, chewing tobacco, and e-cigarettes. If you need help quitting, ask your doctor.  · If you have apnea (obstructive sleep apnea), manage it as told by your doctor.  · Do not drink alcohol.  · Do not drink beverages that have  caffeine. These include coffee, soda, and tea.  · Maintain a healthy weight. Do not use diet pills unless your doctor says they are safe for you. Diet pills may make heart problems worse.  · Follow diet instructions as told by your doctor.  · Exercise regularly as told by your doctor.  · Keep all follow-up visits as told by your doctor. This is important.  Contact a doctor if:  · You notice a change in the speed, rhythm, or strength of your heartbeat.  · You are taking a blood-thinning medicine and you notice more bruising.  · You get tired more easily when you move or exercise.  Get help right away if:  · You have pain in your chest or your belly (abdomen).  · You have sweating or weakness.  · You feel sick to your stomach (nauseous).  · You notice blood in your throw up (vomit), poop (stool), or pee (urine).  · You are short of breath.  · You suddenly have swollen feet and ankles.  · You feel dizzy.  · Your suddenly get weak or numb in your face, arms, or legs, especially if it happens on one side of your body.  · You have trouble talking, trouble understanding, or both.  · Your face or your eyelid droops on one side.  These symptoms may be an emergency. Do not wait to see if the symptoms will go away. Get medical help right away. Call your local emergency services (911 in the [place] Do not drive yourself to the hospital.   This information is not intended to replace advice given to you by your health care provider. Make sure you discuss any questions you have with your health care provider.  Document Released: 09/26/2009 Document Revised: 05/25/2017 Document Reviewed: 04/13/2016  © 2017 [employer]         Gout  Gout is painful swelling that can occur in some of your joints. Gout is a type of arthritis. This condition is caused by having too much uric acid in your body. Uric acid is a chemical that forms when your body breaks down substances called purines. Purines are important for building body proteins.  When  your body has too much uric acid, sharp crystals can form and build up inside your joints. This causes pain and swelling. Gout attacks can happen quickly and be very painful (acute gout). Over time, the attacks can affect more joints and become more frequent (chronic gout). Gout can also cause uric acid to build up under your skin and inside your kidneys.  What are the causes?  This condition is caused by too much uric acid in your blood. This can occur because:  · Your kidneys do not remove enough uric acid from your blood. This is the most common cause.  · Your body makes too much uric acid. This can occur with some cancers and cancer treatments. It can also occur if your body is breaking down too many red blood cells (hemolytic anemia).  · You eat too many foods that are high in purines. These foods include organ meats and some seafood. Alcohol, especially beer, is also high in purines.  A gout attack may be triggered by trauma or stress.  What increases the risk?  This condition is more likely to develop in people who:  · Have a family history of gout.  · Are male and middle-aged.  · Are female and have gone through menopause.  · Are obese.  · Frequently drink alcohol, especially beer.  · Are dehydrated.  · Lose weight too quickly.  · Have an organ transplant.  · Have lead poisoning.  · Take certain medicines, including aspirin, cyclosporine, diuretics, levodopa, and niacin.  · Have kidney disease or psoriasis.  What are the signs or symptoms?  An attack of acute gout happens quickly. It usually occurs in just one joint. The most common place is the big toe. Attacks often start at night. Other joints that may be affected include joints of the feet, ankle, knee, fingers, wrist, or elbow. Symptoms may include:  · Severe pain.  · Warmth.  · Swelling.  · Stiffness.  · Tenderness. The affected joint may be very painful to touch.  · Shiny, red, or purple skin.  · Chills and fever.  Chronic gout may cause symptoms more  frequently. More joints may be involved. You may also have white or yellow lumps (tophi) on your hands or feet or in other areas near your joints.  How is this diagnosed?  This condition is diagnosed based on your symptoms, medical history, and physical exam. You may have tests, such as:  · Blood tests to measure uric acid levels.  · Removal of joint fluid with a needle (aspiration) to look for uric acid crystals.  · X-rays to look for joint damage.  How is this treated?  Treatment for this condition has two phases: treating an acute attack and preventing future attacks. Acute gout treatment may include medicines to reduce pain and swelling, including:  · NSAIDs.  · Steroids. These are strong anti-inflammatory medicines that can be taken by mouth (orally) or injected into a joint.  · Colchicine. This medicine relieves pain and swelling when it is taken soon after an attack. It can be given orally or through an IV tube.  Preventive treatment may include:  · Daily use of smaller doses of NSAIDs or colchicine.  · Use of a medicine that reduces uric acid levels in your blood.  · Changes to your diet. You may need to see a specialist about healthy eating (dietitian).  Follow these instructions at home:  During a Gout Attack  · If directed, apply ice to the affected area:  ¨ Put ice in a plastic bag.  ¨ Place a towel between your skin and the bag.  ¨ Leave the ice on for 20 minutes, 2-3 times a day.  · Rest the joint as much as possible. If the affected joint is in your leg, you may be given crutches to use.  · Raise (elevate) the affected joint above the level of your heart as often as possible.  · Drink enough fluids to keep your urine clear or pale yellow.  · Take over-the-counter and prescription medicines only as told by your health care provider.  · Do not drive or operate heavy machinery while taking prescription pain medicine.  · Follow instructions from your health care provider about eating or drinking  restrictions.  · Return to your normal activities as told by your health care provider. Ask your health care provider what activities are safe for you.  Avoiding Future Gout Attacks  · Follow a low-purine diet as told by your dietitian or health care provider. Avoid foods and drinks that are high in purines, including liver, kidney, anchovies, asparagus, herring, mushrooms, mussels, and beer.  · Limit alcohol intake to no more than 1 drink a day for nonpregnant women and 2 drinks a day for men. One drink equals 12 oz of beer, 5 oz of wine, or 1½ oz of hard liquor.  · Maintain a healthy weight or lose weight if you are overweight. If you want to lose weight, talk with your health care provider. It is important that you do not lose weight too quickly.  · Start or maintain an exercise program as told by your health care provider.  · Drink enough fluids to keep your urine clear or pale yellow.  · Take over-the-counter and prescription medicines only as told by your health care provider.  · Keep all follow-up visits as told by your health care provider. This is important.  Contact a health care provider if:  · You have another gout attack.  · You continue to have symptoms of a gout attack after10 days of treatment.  · You have side effects from your medicines.  · You have chills or a fever.  · You have burning pain when you urinate.  · You have pain in your lower back or belly.  Get help right away if:  · You have severe or uncontrolled pain.  · You cannot urinate.  This information is not intended to replace advice given to you by your health care provider. Make sure you discuss any questions you have with your health care provider.  Document Released: 12/15/2001 Document Revised: 05/25/2017 Document Reviewed: 09/29/2016  [employer] Interactive Patient Education © 2017 [employer]        Depression / Suicide Risk    As you are discharged from this [facility] facility, it is important to learn how to keep safe from  harming yourself.    Recognize the warning signs:  · Abrupt changes in personality, positive or negative- including increase in energy   · Giving away possessions  · Change in eating patterns- significant weight changes-  positive or negative  · Change in sleeping patterns- unable to sleep or sleeping all the time   · Unwillingness or inability to communicate  · Depression  · Unusual sadness, discouragement and loneliness  · Talk of wanting to die  · Neglect of personal appearance   · Rebelliousness- reckless behavior  · Withdrawal from people/activities they love  · Confusion- inability to concentrate     If you or a loved one observes any of these behaviors or has concerns about self-harm, here's what you can do:  · Talk about it- your feelings and reasons for harming yourself  · Remove any means that you might use to hurt yourself (examples: pills, rope, extension cords, firearm)  · Get professional help from the community (Mental Health, Substance Abuse, psychological counseling)  · Do not be alone:Call your Safe Contact- someone whom you trust who will be there for you.  · Call your local CRISIS HOTLINE 386-2977 or 705-645-9223  · Call your local Children's Mobile Crisis Response Team [place] (541) 389-0893 or www.e27  · Call the toll free National Suicide Prevention Hotlines   · National Suicide Prevention Lifeline 413-259-WXYR (5403)  · National Hope Line Network 800-SUICIDE (630-5117)

## 2019-12-22 NOTE — DISCHARGE SUMMARY
Internal Medicine Discharge Summary  Note Author: Scottie Bansal M.D.       Name Morales Olivier     1955   Age/Sex 64 y.o. male   MRN [id number]         Admit Date:  2019       Discharge Date:   2019    Service:   [facility] Internal Medicine [place] Team  Attending Physician(s):   Dr. Shah       Senior Resident(s):   Maosn Stevenson  Gavin Resident(s):   Scottie Bansal  PCP: Pcp Pt States None      Primary Diagnosis:   Gout    Secondary Diagnoses:                Principal Problem:    Gout POA: Unknown  Active Problems:    A-fib (HCC) POA: Yes    COPD exacerbation (HCC) POA: Yes    Chest x-ray abnormality POA: Unknown    Osteomyelitis (HCC) POA: Unknown    Deep vein thrombosis (HCC) [I82.409] POA: Yes    Benign prostatic hyperplasia without lower urinary tract symptoms POA: Yes    Type 2 diabetes mellitus with complication, without long-term current use of insulin (HCC) POA: Yes    Cellulitis POA: Unknown  Resolved Problems:    Gout attack POA: Yes      Hospital Summary (Brief Narrative):         Mr. Morales Olivier is a 64-year-old man with  atrial fibrillation (rate-controlled with metoprolol and on warfarin anticoagulation), sick sinus syndrome status-post pacemaker, hypertension, diabetes mellitus, mitral valve regurgitation status-post bovine valve replacement, and a history of deep vein thrombosis who presented on 2019 with erythema, pain, and edema of the right lower leg of four day duration. The patient was tachycardic to the 110s BPM on presentation, but exhibited no other signs of systemic infection. Deep vein thrombosis studies and x-rays obtained on the day of presentation were unremarkable. The patient was admitted and started on vancomycin (later transitioned to doxycyline) and Unasyn for suspected cellulitis.     A CT scan obtained on 2019 showed evidence of osteomyelitis affecting the right second and fourth toes. However, per infectious disease, the symptoms were  far more consistent with an acute gout flare affecting the right knee with overlying cellulitis. Right knee x-ray was unremarkable. Mr. Olivier adamantly refused intraarticular glucocorticoids and his renal function was not appropriate for non-steroidal anti-inflammatory agents. The patient was therefore started on a five-day course of prednisone for his gout flare and Keflex for the cellulitis. Pain management was achieved with tramadol and Norco.    In addition to the above, the patient was persistently tachycardic as high as 140 BPM during his hospitalization; this was likely due to pain. His metoprolol dose was increased from 25 milligrams twice daily to 75 milligrams twice daily. As his gout flare and associated pain symptoms improved, the patient's heart rate returned to the normal-high range ( BPM).    The patient reported that the pain had improved and was ambulating comfortably on the morning of 12/22/2019. He was discharged in stable condition with instructions to complete his five-day course of prednisone and Keflex. He was recommended Toprol XL 50 milligrams for his atrial fibrillation/tachycardia as well as colchicine for future gout flares. The patient adamantly refused the new medications, stating that he would not be able to afford them at this time. I explained that the only medications that were absolutely necessary to prevent life-threatening complications were metoprolol and warfarin. The patient reported that he had these medications at home and refused filling prescriptions for prednisone, antibiotics, analgesics, and colchicine. He did agree to return next week when he would be able to afford these medications.    Patient /Hospital Summary (Details -- Problem Oriented) :          * Gout  Assessment & Plan  Mr. Olivier presented with symptoms consistent with cellulitis affecting the right leg. A CT scan revealed findings suggestive of osteomyelitis of the right second and fourth toes.  However, correlation of the patient's clinical history of physical examination findings were used to establish a diagnosis of gout of the right knee with overlying cellulitis.    - Right knee x-ray normal  - Continue home allopurinol  - Patient adamantly refuses intraarticular glucocorticoids and has impaired renal function. We will therefore start prednisone 40 mg x 5 days.   - Norco and tramadol for pain  - Discharge with colchicine as needed for gout flares    A-fib ([facility])  Assessment & Plan  History of A. fib  On anticoagulation for MVR  INR 3 today  On metoprolol 25 twice daily at home  Patient went into A. fib with RVR heart rate into the 130s in ED.  Received 10 mg of diltiazem  Heart rate in 100s    Plan  Improved tachycardia with aggressive pain control  Start Toprol XL 50 mg daily  Metoprolol 5 mg IV push for HR > 120 BPM  Pharmacy for goal INR of 2-3.  Patient does report his INR supposed to be 2.5-3.5.  Day team to clarify as patient has bovine valve.  Reportedly, at some point he did have porcine valve which was replaced with bovine  Recent echo 2 months ago showed EF of 55%.  Patient does have elevated RV pressure at 50      Gross hematuria-resolved as of 12/22/2019  Assessment & Plan  Discharged on 12/16/2019 for hematuria secondary to nephrolithiasis right kidney, measuring at 8 mm    Resolved  Patient denies any hematuria, flank pain, nausea, vomiting      Osteomyelitis ([facility])  Assessment & Plan  Mr. Olivier is a 64-year-old man with a history of atrial fibrillation, MVR s/p bovine valve replacement, SSS s/p pacemaker, and gout who presented on 12/19/2019 with erythema, pain, and edema of the right lower leg of four day duration. He was initially diagnosed with cellulitis and started on doxycycline for MRSA coverage and Unasyn for anaerobe/Gram-negative coverage. CT scan obtained on 12/20/2019 showed signs of osteomyelitis affecting the right second and fourth toes. However, per infectious disease, the  "symptoms are far more suggestive of an acute gout flare affecting the right knee with overlying cellulitis.    - Keflex PO for cellulitis  - Gout treatment as above  - Norco and tramadol for pain    Chest x-ray abnormality  Assessment & Plan  Chest x-ray showed a small infrahilar infiltrate. Patient denies fevers or dyspnea.    - Two-view CXR showed mild interstitial opacities consistent with atelectasis/mild edema  - Continue to monitor    COPD exacerbation (HCC)  Assessment & Plan  - Mild per patient  - Lungs clear bilaterally to auscultation on physical examination  - Avoiding albuterol due to tachycardia  - Patient refuses levalbuterol  - Continue to monitor      Cellulitis  Assessment & Plan  Please see assessment and plan for \"osteomyelitis.\"      Type 2 diabetes mellitus with complication, without long-term current use of insulin (HCC)  Assessment & Plan  - ISS    Benign prostatic hyperplasia without lower urinary tract symptoms  Assessment & Plan  - Continue home tamsulosin    Deep vein thrombosis (HCC) [I82.409]  Assessment & Plan  History of DVT without PE per patient  Ultrasound of bilateral lower extremities did not show any acute DVTs  Patient is already on anticoagulation with INR of 3 in ED  Warfarin per pharmacy with goal INR mentioned above    Gout attack-resolved as of 12/15/2019  Assessment & Plan  Under control  Uric acid 6.31-month ago    Continue home allopurinol      Consultants:     Infectious disease: Dr. Lyles    Procedures:        None    Imaging/ Testing:      DX-KNEE COMPLETE 4+ RIGHT   Final Result         1. No acute osseous abnormality.      US-MARJORIE SINGLE LEVEL BILAT   Final Result      CT-EXTREMITY, LOWER WITH RIGHT   Final Result         1.  Fourth toe proximal phalanx head and middle phalanx permeative and lytic changes, appearance most compatible with osteomyelitis.   2.  Changes at the second toe distal interphalangeal joint likely correspond with osteomyelitis.   3.  Single " vessel runoff at the ankle   4.  Diffuse subcutaneous fat stranding compatible with edema.      DX-CHEST-2 VIEWS   Final Result      Cardiomegaly.   Mild interstitial opacities consistent with atelectasis/mild edema.   Possible right pleural effusion.      DX-TIBIA AND FIBULA RIGHT   Final Result      No acute fracture. No cortical bone destruction or periosteal reaction.      DX-CHEST-LIMITED (1 VIEW)   Final Result         1.  New hazy right infrahilar infiltrate   2.  Cardiomegaly      US-EXTREMITY VENOUS LOWER UNILAT RIGHT   Final Result            Discharge Medications:         Medication Reconciliation: Completed       Medication List      START taking these medications      Instructions   cephALEXin 500 MG Caps  Commonly known as:  KEFLEX   Take 1 Cap by mouth every 6 hours for 3 days.  Dose:  500 mg     colchicine 0.6 MG Tabs  Commonly known as:  COLCRYS   Take two tabs at the onset of a suspected gout flare, followed by one tab one hour later. Then, taken one tab daily until symptoms resolve.     [employer] BLOOD PRESSURE CUFF Misc   Doctor's comments:  Any brand covered by insurance OK  Check BP daily     HYDROcodone-acetaminophen 5-325 MG Tabs per tablet  Commonly known as:  NORCO   Take 1-2 Tabs by mouth every 6 hours as needed for up to 5 days.  Dose:  1-2 Tab     metoprolol SR 50 MG Tb24  Commonly known as:  TOPROL XL   Take 1 Tab by mouth every day.  Dose:  50 mg     predniSONE 20 MG Tabs  Start taking on:  December 23, 2019  Commonly known as:  DELTASONE   Take two tabs (40 mg) daily on Monday, Tuesday, Wednesday, and Thursday     tramadol 50 MG Tabs  Commonly known as:  ULTRAM   Take 1 Tab by mouth every 6 hours as needed for up to 5 days.  Dose:  50 mg        CONTINUE taking these medications      Instructions   acetaminophen 500 MG Tabs  Commonly known as:  TYLENOL   Take 500-1,000 mg by mouth every 6 hours as needed for Moderate Pain.  Dose:  500-1,000 mg     albuterol 108 (90 Base) MCG/ACT Aers  inhalation aerosol   Inhale 2 Puffs by mouth every 6 hours as needed for Shortness of Breath.  Dose:  2 Puff     allopurinol 100 MG Tabs  Commonly known as:  ZYLOPRIM   Take 1 Tab by mouth every day for 180 days.  Dose:  100 mg     metFORMIN 500 MG Tabs  Commonly known as:  GLUCOPHAGE   Take 1 Tab by mouth 2 times a day, with meals for 180 days.  Dose:  500 mg     tamsulosin 0.4 MG capsule  Commonly known as:  FLOMAX   Take 1 Cap by mouth ONE-HALF HOUR AFTER BREAKFAST for 180 days.  Dose:  0.4 mg     warfarin 5 MG Tabs  Commonly known as:  COUMADIN   Take 5-7 mg by mouth every evening. 7 mg on Wednesday   5 mg all other days  Dose:  5-7 mg        STOP taking these medications    metoprolol 25 MG Tabs  Commonly known as:  LOPRESSOR            Can use .DISCHARGEMEDSLIST if going to another facility         Disposition:   Home    Diet:   Diabetic    Activity:   Ad cesario    Instructions:         The patient was instructed to return to the ER in the event of worsening symptoms. I have counseled the patient on the importance of compliance and the patient has agreed to proceed with all medical recommendations and follow up plan indicated above.   The patient understands that all medications come with benefits and risks. Risks may include permanent injury or death and these risks can be minimized with close reassessment and monitoring.        Primary Care Provider:    Pcp Pt States None  Discharge summary faxed to primary care provider:  Deferred  Copy of discharge summary given to the patient: Deferred      Follow up appointment details :      Future Appointments   Date Time Provider Department Center   1/9/2020 11:00 AM [facility] EXAM 4 [facility] None   6/16/2020  1:00 PM VASCULAR NURSE PRACTITIONER [facility] None     Primary Care Physician    Schedule an appointment as soon as possible for a visit in 2 weeks  Hospital Follow Up        Pending Studies:        None    Time spent on discharge day patient visit, preparing discharge paperwork  and arranging for patient follow up.    Summary of follow up issues:   Complete course of prednisone and Keflex.    Continue home allopurinol.    Take colchicine per instructions for gout flares.    Follow up with primary care physician within one month for routine care.    Discharge Time (Minutes) :    49  Hospital Course Type:  Inpatient Stay >2 midnights      Condition on Discharge    ______________________________________________________________________    Interval history/exam for day of discharge:    Mr. Morales Olivier is a 64-year-old man with  atrial fibrillation (rate-controlled with metoprolol and on warfarin anticoagulation), sick sinus syndrome status-post pacemaker, hypertension, diabetes mellitus, mitral valve regurgitation status-post bovine valve replacement, and a history of DVT who presented on 12/19/2019 with erythema, pain, and edema of the right lower leg of four day duration. The patient was tachycardic to the 110s BPM on presentation, but exhibited no other signs of systemic infection. DVT studies and x-rays obtained on the day of presentation were unremarkable. A CT scan obtained on 12/20/2019 showed evidence of osteomyelitis affecting the right second and fourth toes. However, per infectious disease, the symptoms are far more consistent with an acute gout flare affecting the right knee with overlying cellulitis.    - Mr. Olivier feels well this morning. Pain significantly improved. Ambulating without difficulty.   - Tachycardia improved.  - Cellulitis improved on physical examination.   - No new symptoms. Patient medically clear for discharge.    Review of Systems   Constitutional: Negative for chills and fever.   HENT: Negative for hearing loss.    Eyes: Negative for blurred vision.   Respiratory: Positive for wheezing. Negative for cough and shortness of breath.    Cardiovascular: Positive for palpitations and leg swelling (right leg cellulitis, improving). Negative for chest pain.    Gastrointestinal: Negative for abdominal pain, constipation, diarrhea, nausea and vomiting.   Musculoskeletal:        Right leg pain, improving   Skin: Positive for rash (right leg cellulitis, improving).   Neurological: Negative for dizziness and headaches.     Physical Exam   Constitutional: He is oriented to person, place, and time.   HENT:   Head: Normocephalic and atraumatic.   Right Ear: External ear normal.   Left Ear: External ear normal.   Eyes: Conjunctivae and EOM are normal. Right eye exhibits no discharge. Left eye exhibits no discharge.   Neck: Normal range of motion. No JVD present.   Cardiovascular: Normal rate, normal heart sounds and intact distal pulses. Exam reveals no gallop and no friction rub.   No murmur heard.  Diminished R pedal pulses  Pulmonary/Chest: Effort normal. No stridor. No respiratory distress. He has wheezes (unchanged). He has no rales. He exhibits no tenderness.   Abdominal: Soft. Bowel sounds are normal. He exhibits no distension and no mass. There is no tenderness. There is no rebound and no guarding.   Musculoskeletal:         General: Tenderness (right lower leg) and edema (right lower leg) present.      Comments: ROM of right leg limited by pain   Neurological: He is alert and oriented to person, place, and time.   Skin: Rash (poorly-demarcated erythematous region on anterior surface of right lower leg, improving) noted.   Psychiatric: Mood, memory, affect and judgment normal.    Most Recent Labs:    Lab Results   Component Value Date/Time    WBC 8.1 12/20/2019 03:15 AM    RBC 3.86 (L) 12/20/2019 03:15 AM    HEMOGLOBIN 12.7 (L) 12/20/2019 03:15 AM    HEMATOCRIT 38.7 (L) 12/20/2019 03:15 AM    .3 (H) 12/20/2019 03:15 AM    MCH 32.9 12/20/2019 03:15 AM    MCHC 32.8 (L) 12/20/2019 03:15 AM    MPV 9.8 12/20/2019 03:15 AM    NEUTSPOLYS 77.40 (H) 12/20/2019 03:15 AM    LYMPHOCYTES 11.20 (L) 12/20/2019 03:15 AM    MONOCYTES 6.80 12/20/2019 03:15 AM    EOSINOPHILS 3.50  12/20/2019 03:15 AM    BASOPHILS 0.50 12/20/2019 03:15 AM    ANISOCYTOSIS 1+ 07/09/2009 02:35 AM      Lab Results   Component Value Date/Time    SODIUM 139 12/20/2019 03:15 AM    POTASSIUM 4.2 12/20/2019 03:15 AM    CHLORIDE 108 12/20/2019 03:15 AM    CO2 25 12/20/2019 03:15 AM    GLUCOSE 124 (H) 12/20/2019 03:15 AM    BUN 21 12/20/2019 03:15 AM    CREATININE 1.23 12/20/2019 03:15 AM    CREATININE 1.4 04/27/2009 10:08 AM      Lab Results   Component Value Date/Time    ALTSGPT 17 12/16/2019 03:37 AM    ASTSGOT 16 12/16/2019 03:37 AM    ALKPHOSPHAT 80 12/16/2019 03:37 AM    TBILIRUBIN 0.7 12/16/2019 03:37 AM    LIPASE 19 12/04/2018 11:49 AM    ALBUMIN 4.5 12/16/2019 03:37 AM    GLOBULIN 2.4 12/16/2019 03:37 AM    INR 2.45 (H) 12/22/2019 03:18 AM    MACROCYTOSIS 1+ 07/06/2009 05:00 AM     Lab Results   Component Value Date/Time    PROTHROMBTM 27.5 (H) 12/22/2019 03:18 AM    INR 2.45 (H) 12/22/2019 03:18 AM

## 2019-12-24 LAB
BACTERIA BLD CULT: NORMAL
BACTERIA BLD CULT: NORMAL
SIGNIFICANT IND 70042: NORMAL
SIGNIFICANT IND 70042: NORMAL
SITE SITE: NORMAL
SITE SITE: NORMAL
SOURCE SOURCE: NORMAL
SOURCE SOURCE: NORMAL

## 2020-01-01 ENCOUNTER — APPOINTMENT (OUTPATIENT)
Dept: RADIOLOGY | Facility: MEDICAL CENTER | Age: 65
DRG: 871 | End: 2020-01-01
Attending: EMERGENCY MEDICINE
Payer: MEDICARE

## 2020-01-01 ENCOUNTER — APPOINTMENT (OUTPATIENT)
Dept: RADIOLOGY | Facility: MEDICAL CENTER | Age: 65
End: 2020-01-01
Attending: INTERNAL MEDICINE
Payer: MEDICARE

## 2020-01-01 ENCOUNTER — PATIENT OUTREACH (OUTPATIENT)
Dept: HEALTH INFORMATION MANAGEMENT | Facility: OTHER | Age: 65
End: 2020-01-01

## 2020-01-01 ENCOUNTER — ANTICOAGULATION MONITORING (OUTPATIENT)
Dept: VASCULAR LAB | Facility: MEDICAL CENTER | Age: 65
End: 2020-01-01

## 2020-01-01 ENCOUNTER — APPOINTMENT (OUTPATIENT)
Dept: RADIOLOGY | Facility: MEDICAL CENTER | Age: 65
DRG: 871 | End: 2020-01-01
Attending: STUDENT IN AN ORGANIZED HEALTH CARE EDUCATION/TRAINING PROGRAM
Payer: MEDICARE

## 2020-01-01 ENCOUNTER — HOSPITAL ENCOUNTER (INPATIENT)
Facility: MEDICAL CENTER | Age: 65
LOS: 8 days | DRG: 871 | End: 2020-12-24
Attending: EMERGENCY MEDICINE | Admitting: STUDENT IN AN ORGANIZED HEALTH CARE EDUCATION/TRAINING PROGRAM
Payer: MEDICARE

## 2020-01-01 ENCOUNTER — OFFICE VISIT (OUTPATIENT)
Dept: PULMONOLOGY | Facility: HOSPICE | Age: 65
End: 2020-01-01
Payer: MEDICARE

## 2020-01-01 ENCOUNTER — APPOINTMENT (OUTPATIENT)
Dept: CARDIOLOGY | Facility: MEDICAL CENTER | Age: 65
End: 2020-01-01
Attending: INTERNAL MEDICINE
Payer: MEDICARE

## 2020-01-01 ENCOUNTER — APPOINTMENT (OUTPATIENT)
Dept: RADIOLOGY | Facility: MEDICAL CENTER | Age: 65
DRG: 871 | End: 2020-01-01
Attending: HOSPITALIST
Payer: MEDICARE

## 2020-01-01 ENCOUNTER — HOSPITAL ENCOUNTER (OUTPATIENT)
Facility: MEDICAL CENTER | Age: 65
End: 2020-11-23
Attending: EMERGENCY MEDICINE | Admitting: INTERNAL MEDICINE
Payer: MEDICARE

## 2020-01-01 ENCOUNTER — APPOINTMENT (OUTPATIENT)
Dept: RADIOLOGY | Facility: MEDICAL CENTER | Age: 65
End: 2020-01-01
Attending: EMERGENCY MEDICINE
Payer: MEDICARE

## 2020-01-01 ENCOUNTER — APPOINTMENT (OUTPATIENT)
Dept: VASCULAR LAB | Facility: MEDICAL CENTER | Age: 65
End: 2020-01-01
Attending: INTERNAL MEDICINE
Payer: MEDICARE

## 2020-01-01 VITALS
DIASTOLIC BLOOD PRESSURE: 69 MMHG | WEIGHT: 279.32 LBS | SYSTOLIC BLOOD PRESSURE: 124 MMHG | HEIGHT: 70 IN | BODY MASS INDEX: 39.99 KG/M2 | OXYGEN SATURATION: 97 % | HEART RATE: 80 BPM | TEMPERATURE: 98.7 F | RESPIRATION RATE: 20 BRPM

## 2020-01-01 VITALS
HEART RATE: 113 BPM | WEIGHT: 271 LBS | DIASTOLIC BLOOD PRESSURE: 67 MMHG | BODY MASS INDEX: 38.88 KG/M2 | SYSTOLIC BLOOD PRESSURE: 99 MMHG

## 2020-01-01 VITALS
OXYGEN SATURATION: 95 % | HEART RATE: 88 BPM | DIASTOLIC BLOOD PRESSURE: 70 MMHG | RESPIRATION RATE: 16 BRPM | WEIGHT: 270 LBS | SYSTOLIC BLOOD PRESSURE: 130 MMHG | BODY MASS INDEX: 38.65 KG/M2 | HEIGHT: 70 IN

## 2020-01-01 VITALS
WEIGHT: 262.57 LBS | BODY MASS INDEX: 37.59 KG/M2 | DIASTOLIC BLOOD PRESSURE: 65 MMHG | HEART RATE: 120 BPM | HEIGHT: 70 IN | SYSTOLIC BLOOD PRESSURE: 106 MMHG | TEMPERATURE: 98.1 F | OXYGEN SATURATION: 96 % | RESPIRATION RATE: 17 BRPM

## 2020-01-01 VITALS
SYSTOLIC BLOOD PRESSURE: 122 MMHG | DIASTOLIC BLOOD PRESSURE: 80 MMHG | BODY MASS INDEX: 39.17 KG/M2 | WEIGHT: 273 LBS | HEART RATE: 66 BPM

## 2020-01-01 VITALS — DIASTOLIC BLOOD PRESSURE: 49 MMHG | SYSTOLIC BLOOD PRESSURE: 95 MMHG | HEART RATE: 58 BPM

## 2020-01-01 DIAGNOSIS — E66.9 OBESITY (BMI 30-39.9): ICD-10-CM

## 2020-01-01 DIAGNOSIS — G47.33 OSA (OBSTRUCTIVE SLEEP APNEA): ICD-10-CM

## 2020-01-01 DIAGNOSIS — A41.9 SEVERE SEPSIS (HCC): ICD-10-CM

## 2020-01-01 DIAGNOSIS — I82.4Z9 DEEP VEIN THROMBOSIS (DVT) OF DISTAL VEIN OF LOWER EXTREMITY, UNSPECIFIED CHRONICITY, UNSPECIFIED LATERALITY (HCC): ICD-10-CM

## 2020-01-01 DIAGNOSIS — L03.116 CELLULITIS OF LEFT LOWER EXTREMITY: ICD-10-CM

## 2020-01-01 DIAGNOSIS — R65.20 SEVERE SEPSIS (HCC): ICD-10-CM

## 2020-01-01 DIAGNOSIS — I48.0 PAROXYSMAL ATRIAL FIBRILLATION (HCC): ICD-10-CM

## 2020-01-01 DIAGNOSIS — Z95.2 H/O MITRAL VALVE REPLACEMENT: ICD-10-CM

## 2020-01-01 DIAGNOSIS — I82.4Y9 DEEP VEIN THROMBOSIS (DVT) OF PROXIMAL LOWER EXTREMITY, UNSPECIFIED CHRONICITY, UNSPECIFIED LATERALITY (HCC): ICD-10-CM

## 2020-01-01 DIAGNOSIS — R21 RASH: ICD-10-CM

## 2020-01-01 DIAGNOSIS — R07.9 CHEST PAIN, UNSPECIFIED TYPE: ICD-10-CM

## 2020-01-01 DIAGNOSIS — E87.20 LACTIC ACIDOSIS: ICD-10-CM

## 2020-01-01 DIAGNOSIS — R42 DIZZINESS OF UNKNOWN ETIOLOGY: ICD-10-CM

## 2020-01-01 DIAGNOSIS — Z87.891 FORMER SMOKER: ICD-10-CM

## 2020-01-01 DIAGNOSIS — L29.9 ITCHING: ICD-10-CM

## 2020-01-01 LAB
ALBUMIN SERPL BCP-MCNC: 3.9 G/DL (ref 3.2–4.9)
ALBUMIN SERPL BCP-MCNC: 4 G/DL (ref 3.2–4.9)
ALBUMIN/GLOB SERPL: 1.4 G/DL
ALBUMIN/GLOB SERPL: 1.6 G/DL
ALP SERPL-CCNC: 65 U/L (ref 30–99)
ALP SERPL-CCNC: 73 U/L (ref 30–99)
ALT SERPL-CCNC: 11 U/L (ref 2–50)
ALT SERPL-CCNC: 17 U/L (ref 2–50)
AMPHET UR QL SCN: NEGATIVE
ANION GAP SERPL CALC-SCNC: 10 MMOL/L (ref 7–16)
ANION GAP SERPL CALC-SCNC: 10 MMOL/L (ref 7–16)
ANION GAP SERPL CALC-SCNC: 4 MMOL/L (ref 7–16)
ANION GAP SERPL CALC-SCNC: 7 MMOL/L (ref 7–16)
ANION GAP SERPL CALC-SCNC: 8 MMOL/L (ref 7–16)
ANION GAP SERPL CALC-SCNC: 9 MMOL/L (ref 7–16)
ANISOCYTOSIS BLD QL SMEAR: ABNORMAL
APPEARANCE UR: CLEAR
AST SERPL-CCNC: 14 U/L (ref 12–45)
AST SERPL-CCNC: 20 U/L (ref 12–45)
BACTERIA BLD CULT: ABNORMAL
BACTERIA BLD CULT: ABNORMAL
BACTERIA BLD CULT: NORMAL
BACTERIA UR CULT: NORMAL
BACTERIA WND AEROBE CULT: NORMAL
BARBITURATES UR QL SCN: NEGATIVE
BASOPHILS # BLD AUTO: 0 % (ref 0–1.8)
BASOPHILS # BLD AUTO: 0 % (ref 0–1.8)
BASOPHILS # BLD AUTO: 0.6 % (ref 0–1.8)
BASOPHILS # BLD AUTO: 0.7 % (ref 0–1.8)
BASOPHILS # BLD AUTO: 0.8 % (ref 0–1.8)
BASOPHILS # BLD AUTO: 0.9 % (ref 0–1.8)
BASOPHILS # BLD AUTO: 1.8 % (ref 0–1.8)
BASOPHILS # BLD: 0 K/UL (ref 0–0.12)
BASOPHILS # BLD: 0 K/UL (ref 0–0.12)
BASOPHILS # BLD: 0.03 K/UL (ref 0–0.12)
BASOPHILS # BLD: 0.03 K/UL (ref 0–0.12)
BASOPHILS # BLD: 0.04 K/UL (ref 0–0.12)
BASOPHILS # BLD: 0.04 K/UL (ref 0–0.12)
BASOPHILS # BLD: 0.06 K/UL (ref 0–0.12)
BASOPHILS # BLD: 0.08 K/UL (ref 0–0.12)
BASOPHILS # BLD: 0.09 K/UL (ref 0–0.12)
BASOPHILS # BLD: 0.18 K/UL (ref 0–0.12)
BENZODIAZ UR QL SCN: NEGATIVE
BILIRUB SERPL-MCNC: 0.3 MG/DL (ref 0.1–1.5)
BILIRUB SERPL-MCNC: 1 MG/DL (ref 0.1–1.5)
BILIRUB UR QL STRIP.AUTO: NEGATIVE
BUN SERPL-MCNC: 10 MG/DL (ref 8–22)
BUN SERPL-MCNC: 11 MG/DL (ref 8–22)
BUN SERPL-MCNC: 12 MG/DL (ref 8–22)
BUN SERPL-MCNC: 15 MG/DL (ref 8–22)
BUN SERPL-MCNC: 17 MG/DL (ref 8–22)
BUN SERPL-MCNC: 20 MG/DL (ref 8–22)
BUN SERPL-MCNC: 21 MG/DL (ref 8–22)
BUN SERPL-MCNC: 23 MG/DL (ref 8–22)
BUN SERPL-MCNC: 27 MG/DL (ref 8–22)
BURR CELLS BLD QL SMEAR: NORMAL
BZE UR QL SCN: NEGATIVE
CALCIUM SERPL-MCNC: 8.3 MG/DL (ref 8.5–10.5)
CALCIUM SERPL-MCNC: 8.4 MG/DL (ref 8.5–10.5)
CALCIUM SERPL-MCNC: 8.4 MG/DL (ref 8.5–10.5)
CALCIUM SERPL-MCNC: 8.5 MG/DL (ref 8.5–10.5)
CALCIUM SERPL-MCNC: 8.5 MG/DL (ref 8.5–10.5)
CALCIUM SERPL-MCNC: 8.6 MG/DL (ref 8.5–10.5)
CALCIUM SERPL-MCNC: 8.7 MG/DL (ref 8.5–10.5)
CALCIUM SERPL-MCNC: 8.8 MG/DL (ref 8.5–10.5)
CALCIUM SERPL-MCNC: 8.9 MG/DL (ref 8.5–10.5)
CALCIUM SERPL-MCNC: 9 MG/DL (ref 8.5–10.5)
CALCIUM SERPL-MCNC: 9 MG/DL (ref 8.5–10.5)
CANNABINOIDS UR QL SCN: NEGATIVE
CHLORIDE SERPL-SCNC: 100 MMOL/L (ref 96–112)
CHLORIDE SERPL-SCNC: 101 MMOL/L (ref 96–112)
CHLORIDE SERPL-SCNC: 102 MMOL/L (ref 96–112)
CHLORIDE SERPL-SCNC: 103 MMOL/L (ref 96–112)
CHLORIDE SERPL-SCNC: 104 MMOL/L (ref 96–112)
CHLORIDE SERPL-SCNC: 98 MMOL/L (ref 96–112)
CHLORIDE SERPL-SCNC: 99 MMOL/L (ref 96–112)
CHLORIDE SERPL-SCNC: 99 MMOL/L (ref 96–112)
CHOLEST SERPL-MCNC: 126 MG/DL (ref 100–199)
CO2 SERPL-SCNC: 23 MMOL/L (ref 20–33)
CO2 SERPL-SCNC: 24 MMOL/L (ref 20–33)
CO2 SERPL-SCNC: 25 MMOL/L (ref 20–33)
CO2 SERPL-SCNC: 26 MMOL/L (ref 20–33)
CO2 SERPL-SCNC: 28 MMOL/L (ref 20–33)
COLOR UR: ABNORMAL
CORTIS SERPL-MCNC: 15.9 UG/DL (ref 0–23)
COVID ORDER STATUS COVID19: NORMAL
COVID ORDER STATUS COVID19: NORMAL
CREAT SERPL-MCNC: 0.96 MG/DL (ref 0.5–1.4)
CREAT SERPL-MCNC: 1.05 MG/DL (ref 0.5–1.4)
CREAT SERPL-MCNC: 1.11 MG/DL (ref 0.5–1.4)
CREAT SERPL-MCNC: 1.14 MG/DL (ref 0.5–1.4)
CREAT SERPL-MCNC: 1.15 MG/DL (ref 0.5–1.4)
CREAT SERPL-MCNC: 1.16 MG/DL (ref 0.5–1.4)
CREAT SERPL-MCNC: 1.16 MG/DL (ref 0.5–1.4)
CREAT SERPL-MCNC: 1.21 MG/DL (ref 0.5–1.4)
CREAT SERPL-MCNC: 1.32 MG/DL (ref 0.5–1.4)
CREAT SERPL-MCNC: 1.34 MG/DL (ref 0.5–1.4)
CREAT SERPL-MCNC: 1.5 MG/DL (ref 0.5–1.4)
D DIMER PPP IA.FEU-MCNC: 0.6 UG/ML (FEU) (ref 0–0.5)
EKG IMPRESSION: NORMAL
EOSINOPHIL # BLD AUTO: 0.04 K/UL (ref 0–0.51)
EOSINOPHIL # BLD AUTO: 0.09 K/UL (ref 0–0.51)
EOSINOPHIL # BLD AUTO: 0.21 K/UL (ref 0–0.51)
EOSINOPHIL # BLD AUTO: 0.24 K/UL (ref 0–0.51)
EOSINOPHIL # BLD AUTO: 0.24 K/UL (ref 0–0.51)
EOSINOPHIL # BLD AUTO: 0.27 K/UL (ref 0–0.51)
EOSINOPHIL # BLD AUTO: 0.3 K/UL (ref 0–0.51)
EOSINOPHIL # BLD AUTO: 0.35 K/UL (ref 0–0.51)
EOSINOPHIL # BLD AUTO: 0.37 K/UL (ref 0–0.51)
EOSINOPHIL # BLD AUTO: 0.43 K/UL (ref 0–0.51)
EOSINOPHIL NFR BLD: 0.3 % (ref 0–6.9)
EOSINOPHIL NFR BLD: 0.9 % (ref 0–6.9)
EOSINOPHIL NFR BLD: 2.6 % (ref 0–6.9)
EOSINOPHIL NFR BLD: 2.8 % (ref 0–6.9)
EOSINOPHIL NFR BLD: 3.5 % (ref 0–6.9)
EOSINOPHIL NFR BLD: 3.8 % (ref 0–6.9)
EOSINOPHIL NFR BLD: 4.4 % (ref 0–6.9)
EOSINOPHIL NFR BLD: 4.6 % (ref 0–6.9)
EOSINOPHIL NFR BLD: 7.2 % (ref 0–6.9)
EOSINOPHIL NFR BLD: 8.3 % (ref 0–6.9)
ERYTHROCYTE [DISTWIDTH] IN BLOOD BY AUTOMATED COUNT: 51 FL (ref 35.9–50)
ERYTHROCYTE [DISTWIDTH] IN BLOOD BY AUTOMATED COUNT: 51.2 FL (ref 35.9–50)
ERYTHROCYTE [DISTWIDTH] IN BLOOD BY AUTOMATED COUNT: 51.3 FL (ref 35.9–50)
ERYTHROCYTE [DISTWIDTH] IN BLOOD BY AUTOMATED COUNT: 51.7 FL (ref 35.9–50)
ERYTHROCYTE [DISTWIDTH] IN BLOOD BY AUTOMATED COUNT: 52 FL (ref 35.9–50)
ERYTHROCYTE [DISTWIDTH] IN BLOOD BY AUTOMATED COUNT: 52.1 FL (ref 35.9–50)
ERYTHROCYTE [DISTWIDTH] IN BLOOD BY AUTOMATED COUNT: 52.2 FL (ref 35.9–50)
ERYTHROCYTE [DISTWIDTH] IN BLOOD BY AUTOMATED COUNT: 52.5 FL (ref 35.9–50)
ERYTHROCYTE [DISTWIDTH] IN BLOOD BY AUTOMATED COUNT: 52.6 FL (ref 35.9–50)
ERYTHROCYTE [DISTWIDTH] IN BLOOD BY AUTOMATED COUNT: 52.9 FL (ref 35.9–50)
ERYTHROCYTE [DISTWIDTH] IN BLOOD BY AUTOMATED COUNT: 53.5 FL (ref 35.9–50)
EST. AVERAGE GLUCOSE BLD GHB EST-MCNC: 171 MG/DL
FLUAV RNA SPEC QL NAA+PROBE: NEGATIVE
FLUBV RNA SPEC QL NAA+PROBE: NEGATIVE
GLOBULIN SER CALC-MCNC: 2.5 G/DL (ref 1.9–3.5)
GLOBULIN SER CALC-MCNC: 2.9 G/DL (ref 1.9–3.5)
GLUCOSE BLD-MCNC: 102 MG/DL (ref 65–99)
GLUCOSE BLD-MCNC: 102 MG/DL (ref 65–99)
GLUCOSE BLD-MCNC: 106 MG/DL (ref 65–99)
GLUCOSE BLD-MCNC: 109 MG/DL (ref 65–99)
GLUCOSE BLD-MCNC: 109 MG/DL (ref 65–99)
GLUCOSE BLD-MCNC: 115 MG/DL (ref 65–99)
GLUCOSE BLD-MCNC: 116 MG/DL (ref 65–99)
GLUCOSE BLD-MCNC: 117 MG/DL (ref 65–99)
GLUCOSE BLD-MCNC: 118 MG/DL (ref 65–99)
GLUCOSE BLD-MCNC: 119 MG/DL (ref 65–99)
GLUCOSE BLD-MCNC: 121 MG/DL (ref 65–99)
GLUCOSE BLD-MCNC: 122 MG/DL (ref 65–99)
GLUCOSE BLD-MCNC: 122 MG/DL (ref 65–99)
GLUCOSE BLD-MCNC: 124 MG/DL (ref 65–99)
GLUCOSE BLD-MCNC: 126 MG/DL (ref 65–99)
GLUCOSE BLD-MCNC: 128 MG/DL (ref 65–99)
GLUCOSE BLD-MCNC: 128 MG/DL (ref 65–99)
GLUCOSE BLD-MCNC: 129 MG/DL (ref 65–99)
GLUCOSE BLD-MCNC: 129 MG/DL (ref 65–99)
GLUCOSE BLD-MCNC: 131 MG/DL (ref 65–99)
GLUCOSE BLD-MCNC: 132 MG/DL (ref 65–99)
GLUCOSE BLD-MCNC: 133 MG/DL (ref 65–99)
GLUCOSE BLD-MCNC: 133 MG/DL (ref 65–99)
GLUCOSE BLD-MCNC: 134 MG/DL (ref 65–99)
GLUCOSE BLD-MCNC: 135 MG/DL (ref 65–99)
GLUCOSE BLD-MCNC: 136 MG/DL (ref 65–99)
GLUCOSE BLD-MCNC: 136 MG/DL (ref 65–99)
GLUCOSE BLD-MCNC: 137 MG/DL (ref 65–99)
GLUCOSE BLD-MCNC: 137 MG/DL (ref 65–99)
GLUCOSE BLD-MCNC: 139 MG/DL (ref 65–99)
GLUCOSE BLD-MCNC: 142 MG/DL (ref 65–99)
GLUCOSE BLD-MCNC: 143 MG/DL (ref 65–99)
GLUCOSE BLD-MCNC: 144 MG/DL (ref 65–99)
GLUCOSE BLD-MCNC: 152 MG/DL (ref 65–99)
GLUCOSE BLD-MCNC: 159 MG/DL (ref 65–99)
GLUCOSE BLD-MCNC: 161 MG/DL (ref 65–99)
GLUCOSE BLD-MCNC: 166 MG/DL (ref 65–99)
GLUCOSE BLD-MCNC: 166 MG/DL (ref 65–99)
GLUCOSE BLD-MCNC: 167 MG/DL (ref 65–99)
GLUCOSE BLD-MCNC: 173 MG/DL (ref 65–99)
GLUCOSE BLD-MCNC: 179 MG/DL (ref 65–99)
GLUCOSE BLD-MCNC: 193 MG/DL (ref 65–99)
GLUCOSE BLD-MCNC: 199 MG/DL (ref 65–99)
GLUCOSE BLD-MCNC: 237 MG/DL (ref 65–99)
GLUCOSE SERPL-MCNC: 104 MG/DL (ref 65–99)
GLUCOSE SERPL-MCNC: 104 MG/DL (ref 65–99)
GLUCOSE SERPL-MCNC: 114 MG/DL (ref 65–99)
GLUCOSE SERPL-MCNC: 130 MG/DL (ref 65–99)
GLUCOSE SERPL-MCNC: 131 MG/DL (ref 65–99)
GLUCOSE SERPL-MCNC: 144 MG/DL (ref 65–99)
GLUCOSE SERPL-MCNC: 145 MG/DL (ref 65–99)
GLUCOSE SERPL-MCNC: 157 MG/DL (ref 65–99)
GLUCOSE SERPL-MCNC: 165 MG/DL (ref 65–99)
GLUCOSE SERPL-MCNC: 167 MG/DL (ref 65–99)
GLUCOSE SERPL-MCNC: 168 MG/DL (ref 65–99)
GLUCOSE UR STRIP.AUTO-MCNC: NEGATIVE MG/DL
GRAM STN SPEC: NORMAL
GRAM STN SPEC: NORMAL
HBA1C MFR BLD: 7.6 % (ref 0–5.6)
HCT VFR BLD AUTO: 35 % (ref 42–52)
HCT VFR BLD AUTO: 35.1 % (ref 42–52)
HCT VFR BLD AUTO: 35.1 % (ref 42–52)
HCT VFR BLD AUTO: 35.7 % (ref 42–52)
HCT VFR BLD AUTO: 36.8 % (ref 42–52)
HCT VFR BLD AUTO: 38.4 % (ref 42–52)
HCT VFR BLD AUTO: 39.6 % (ref 42–52)
HCT VFR BLD AUTO: 40.9 % (ref 42–52)
HCT VFR BLD AUTO: 42.6 % (ref 42–52)
HCT VFR BLD AUTO: 43.7 % (ref 42–52)
HCT VFR BLD AUTO: 44.2 % (ref 42–52)
HDLC SERPL-MCNC: 22 MG/DL
HGB BLD-MCNC: 11.3 G/DL (ref 14–18)
HGB BLD-MCNC: 11.5 G/DL (ref 14–18)
HGB BLD-MCNC: 11.5 G/DL (ref 14–18)
HGB BLD-MCNC: 11.8 G/DL (ref 14–18)
HGB BLD-MCNC: 12.3 G/DL (ref 14–18)
HGB BLD-MCNC: 12.6 G/DL (ref 14–18)
HGB BLD-MCNC: 12.8 G/DL (ref 14–18)
HGB BLD-MCNC: 13.5 G/DL (ref 14–18)
HGB BLD-MCNC: 14 G/DL (ref 14–18)
HGB BLD-MCNC: 14.5 G/DL (ref 14–18)
HGB BLD-MCNC: 15 G/DL (ref 14–18)
IMM GRANULOCYTES # BLD AUTO: 0.02 K/UL (ref 0–0.11)
IMM GRANULOCYTES # BLD AUTO: 0.02 K/UL (ref 0–0.11)
IMM GRANULOCYTES # BLD AUTO: 0.08 K/UL (ref 0–0.11)
IMM GRANULOCYTES # BLD AUTO: 0.1 K/UL (ref 0–0.11)
IMM GRANULOCYTES # BLD AUTO: 0.17 K/UL (ref 0–0.11)
IMM GRANULOCYTES # BLD AUTO: 0.21 K/UL (ref 0–0.11)
IMM GRANULOCYTES NFR BLD AUTO: 0.4 % (ref 0–0.9)
IMM GRANULOCYTES NFR BLD AUTO: 0.5 % (ref 0–0.9)
IMM GRANULOCYTES NFR BLD AUTO: 0.8 % (ref 0–0.9)
IMM GRANULOCYTES NFR BLD AUTO: 1.5 % (ref 0–0.9)
IMM GRANULOCYTES NFR BLD AUTO: 1.9 % (ref 0–0.9)
IMM GRANULOCYTES NFR BLD AUTO: 2.7 % (ref 0–0.9)
INR BLD: 2.3 (ref 0.9–1.2)
INR BLD: 2.9 (ref 0.9–1.2)
INR BLD: 7.7 (ref 0.9–1.2)
INR PPP: 1.97 (ref 0.87–1.13)
INR PPP: 2.2 (ref 0.87–1.13)
INR PPP: 2.3 (ref 2–3.5)
INR PPP: 2.44 (ref 0.87–1.13)
INR PPP: 2.48 (ref 0.87–1.13)
INR PPP: 2.78 (ref 0.87–1.13)
INR PPP: 2.9 (ref 2–3.5)
INR PPP: 2.92 (ref 0.87–1.13)
INR PPP: 3.04 (ref 0.87–1.13)
INR PPP: 3.18 (ref 0.87–1.13)
INR PPP: 3.26 (ref 0.87–1.13)
INR PPP: 3.37 (ref 0.87–1.13)
INR PPP: 3.4 (ref 2–3.5)
INR PPP: 3.75 (ref 0.87–1.13)
INR PPP: 3.82 (ref 0.87–1.13)
INR PPP: 3.92 (ref 0.87–1.13)
INR PPP: 4.1 (ref 0.87–1.13)
INR PPP: 7.7 (ref 2–3.5)
KETONES UR STRIP.AUTO-MCNC: ABNORMAL MG/DL
LACTATE BLD-SCNC: 1 MMOL/L (ref 0.5–2)
LACTATE BLD-SCNC: 2.2 MMOL/L (ref 0.5–2)
LACTATE BLD-SCNC: 2.9 MMOL/L (ref 0.5–2)
LDLC SERPL CALC-MCNC: 61 MG/DL
LEUKOCYTE ESTERASE UR QL STRIP.AUTO: NEGATIVE
LG PLATELETS BLD QL SMEAR: NORMAL
LG PLATELETS BLD QL SMEAR: NORMAL
LV EJECT FRACT MOD 2C 99903: 53.84
LV EJECT FRACT MOD 4C 99902: 49.41
LV EJECT FRACT MOD BP 99901: 51.39
LYMPHOCYTES # BLD AUTO: 0.59 K/UL (ref 1–4.8)
LYMPHOCYTES # BLD AUTO: 0.8 K/UL (ref 1–4.8)
LYMPHOCYTES # BLD AUTO: 0.84 K/UL (ref 1–4.8)
LYMPHOCYTES # BLD AUTO: 0.86 K/UL (ref 1–4.8)
LYMPHOCYTES # BLD AUTO: 0.93 K/UL (ref 1–4.8)
LYMPHOCYTES # BLD AUTO: 1 K/UL (ref 1–4.8)
LYMPHOCYTES # BLD AUTO: 1.04 K/UL (ref 1–4.8)
LYMPHOCYTES # BLD AUTO: 1.11 K/UL (ref 1–4.8)
LYMPHOCYTES # BLD AUTO: 2.23 K/UL (ref 1–4.8)
LYMPHOCYTES # BLD AUTO: 2.25 K/UL (ref 1–4.8)
LYMPHOCYTES NFR BLD: 10.5 % (ref 22–41)
LYMPHOCYTES NFR BLD: 15.9 % (ref 22–41)
LYMPHOCYTES NFR BLD: 17.7 % (ref 22–41)
LYMPHOCYTES NFR BLD: 18 % (ref 22–41)
LYMPHOCYTES NFR BLD: 21.4 % (ref 22–41)
LYMPHOCYTES NFR BLD: 21.9 % (ref 22–41)
LYMPHOCYTES NFR BLD: 23 % (ref 22–41)
LYMPHOCYTES NFR BLD: 7.8 % (ref 22–41)
LYMPHOCYTES NFR BLD: 7.9 % (ref 22–41)
LYMPHOCYTES NFR BLD: 8.6 % (ref 22–41)
MAGNESIUM SERPL-MCNC: 1.8 MG/DL (ref 1.5–2.5)
MAGNESIUM SERPL-MCNC: 1.8 MG/DL (ref 1.5–2.5)
MANUAL DIFF BLD: NORMAL
MCH RBC QN AUTO: 31.2 PG (ref 27–33)
MCH RBC QN AUTO: 31.3 PG (ref 27–33)
MCH RBC QN AUTO: 31.8 PG (ref 27–33)
MCH RBC QN AUTO: 31.9 PG (ref 27–33)
MCH RBC QN AUTO: 31.9 PG (ref 27–33)
MCH RBC QN AUTO: 32 PG (ref 27–33)
MCH RBC QN AUTO: 32 PG (ref 27–33)
MCH RBC QN AUTO: 32.1 PG (ref 27–33)
MCH RBC QN AUTO: 32.3 PG (ref 27–33)
MCH RBC QN AUTO: 32.3 PG (ref 27–33)
MCH RBC QN AUTO: 32.8 PG (ref 27–33)
MCHC RBC AUTO-ENTMCNC: 32.2 G/DL (ref 33.7–35.3)
MCHC RBC AUTO-ENTMCNC: 32.2 G/DL (ref 33.7–35.3)
MCHC RBC AUTO-ENTMCNC: 32.3 G/DL (ref 33.7–35.3)
MCHC RBC AUTO-ENTMCNC: 32.8 G/DL (ref 33.7–35.3)
MCHC RBC AUTO-ENTMCNC: 32.8 G/DL (ref 33.7–35.3)
MCHC RBC AUTO-ENTMCNC: 32.9 G/DL (ref 33.7–35.3)
MCHC RBC AUTO-ENTMCNC: 32.9 G/DL (ref 33.7–35.3)
MCHC RBC AUTO-ENTMCNC: 33 G/DL (ref 33.7–35.3)
MCHC RBC AUTO-ENTMCNC: 33.4 G/DL (ref 33.7–35.3)
MCHC RBC AUTO-ENTMCNC: 33.6 G/DL (ref 33.7–35.3)
MCHC RBC AUTO-ENTMCNC: 34.3 G/DL (ref 33.7–35.3)
MCV RBC AUTO: 95.4 FL (ref 81.4–97.8)
MCV RBC AUTO: 95.6 FL (ref 81.4–97.8)
MCV RBC AUTO: 96.2 FL (ref 81.4–97.8)
MCV RBC AUTO: 96.7 FL (ref 81.4–97.8)
MCV RBC AUTO: 97.1 FL (ref 81.4–97.8)
MCV RBC AUTO: 97.2 FL (ref 81.4–97.8)
MCV RBC AUTO: 97.2 FL (ref 81.4–97.8)
MCV RBC AUTO: 97.5 FL (ref 81.4–97.8)
MCV RBC AUTO: 97.5 FL (ref 81.4–97.8)
MCV RBC AUTO: 98.4 FL (ref 81.4–97.8)
MCV RBC AUTO: 98.5 FL (ref 81.4–97.8)
METAMYELOCYTES NFR BLD MANUAL: 0.9 %
METAMYELOCYTES NFR BLD MANUAL: 0.9 %
METAMYELOCYTES NFR BLD MANUAL: 6.1 %
METHADONE UR QL SCN: NEGATIVE
MICRO URNS: ABNORMAL
MONOCYTES # BLD AUTO: 0 K/UL (ref 0–0.85)
MONOCYTES # BLD AUTO: 0.26 K/UL (ref 0–0.85)
MONOCYTES # BLD AUTO: 0.27 K/UL (ref 0–0.85)
MONOCYTES # BLD AUTO: 0.37 K/UL (ref 0–0.85)
MONOCYTES # BLD AUTO: 0.42 K/UL (ref 0–0.85)
MONOCYTES # BLD AUTO: 0.45 K/UL (ref 0–0.85)
MONOCYTES # BLD AUTO: 0.45 K/UL (ref 0–0.85)
MONOCYTES # BLD AUTO: 0.52 K/UL (ref 0–0.85)
MONOCYTES # BLD AUTO: 0.68 K/UL (ref 0–0.85)
MONOCYTES # BLD AUTO: 1.5 K/UL (ref 0–0.85)
MONOCYTES NFR BLD AUTO: 0 % (ref 0–13.4)
MONOCYTES NFR BLD AUTO: 11.6 % (ref 0–13.4)
MONOCYTES NFR BLD AUTO: 2.7 % (ref 0–13.4)
MONOCYTES NFR BLD AUTO: 3.5 % (ref 0–13.4)
MONOCYTES NFR BLD AUTO: 5.3 % (ref 0–13.4)
MONOCYTES NFR BLD AUTO: 6.2 % (ref 0–13.4)
MONOCYTES NFR BLD AUTO: 8.3 % (ref 0–13.4)
MONOCYTES NFR BLD AUTO: 8.3 % (ref 0–13.4)
MONOCYTES NFR BLD AUTO: 8.6 % (ref 0–13.4)
MONOCYTES NFR BLD AUTO: 9.2 % (ref 0–13.4)
MORPHOLOGY BLD-IMP: NORMAL
MRSA DNA SPEC QL NAA+PROBE: NORMAL
MYELOCYTES NFR BLD MANUAL: 1.8 %
MYELOCYTES NFR BLD MANUAL: 2.6 %
NEUTROPHILS # BLD AUTO: 10.08 K/UL (ref 1.82–7.42)
NEUTROPHILS # BLD AUTO: 2.85 K/UL (ref 1.82–7.42)
NEUTROPHILS # BLD AUTO: 2.97 K/UL (ref 1.82–7.42)
NEUTROPHILS # BLD AUTO: 3.53 K/UL (ref 1.82–7.42)
NEUTROPHILS # BLD AUTO: 4.3 K/UL (ref 1.82–7.42)
NEUTROPHILS # BLD AUTO: 5.9 K/UL (ref 1.82–7.42)
NEUTROPHILS # BLD AUTO: 6.21 K/UL (ref 1.82–7.42)
NEUTROPHILS # BLD AUTO: 6.6 K/UL (ref 1.82–7.42)
NEUTROPHILS # BLD AUTO: 7.79 K/UL (ref 1.82–7.42)
NEUTROPHILS # BLD AUTO: 8.78 K/UL (ref 1.82–7.42)
NEUTROPHILS NFR BLD: 61 % (ref 44–72)
NEUTROPHILS NFR BLD: 64.2 % (ref 44–72)
NEUTROPHILS NFR BLD: 66.4 % (ref 44–72)
NEUTROPHILS NFR BLD: 67 % (ref 44–72)
NEUTROPHILS NFR BLD: 68.7 % (ref 44–72)
NEUTROPHILS NFR BLD: 70.2 % (ref 44–72)
NEUTROPHILS NFR BLD: 74.6 % (ref 44–72)
NEUTROPHILS NFR BLD: 78.1 % (ref 44–72)
NEUTROPHILS NFR BLD: 80.6 % (ref 44–72)
NEUTROPHILS NFR BLD: 81.7 % (ref 44–72)
NEUTS BAND NFR BLD MANUAL: 0.9 % (ref 0–10)
NEUTS BAND NFR BLD MANUAL: 1.8 % (ref 0–10)
NEUTS BAND NFR BLD MANUAL: 3.5 % (ref 0–10)
NITRITE UR QL STRIP.AUTO: NEGATIVE
NRBC # BLD AUTO: 0 K/UL
NRBC BLD-RTO: 0 /100 WBC
NT-PROBNP SERPL IA-MCNC: 548 PG/ML (ref 0–125)
OPIATES UR QL SCN: NEGATIVE
OVALOCYTES BLD QL SMEAR: NORMAL
OVALOCYTES BLD QL SMEAR: NORMAL
OXYCODONE UR QL SCN: NEGATIVE
PCP UR QL SCN: NEGATIVE
PH UR STRIP.AUTO: 5 [PH] (ref 5–8)
PHOSPHATE SERPL-MCNC: 2.4 MG/DL (ref 2.5–4.5)
PHOSPHATE SERPL-MCNC: 2.5 MG/DL (ref 2.5–4.5)
PLATELET # BLD AUTO: 113 K/UL (ref 164–446)
PLATELET # BLD AUTO: 124 K/UL (ref 164–446)
PLATELET # BLD AUTO: 127 K/UL (ref 164–446)
PLATELET # BLD AUTO: 131 K/UL (ref 164–446)
PLATELET # BLD AUTO: 137 K/UL (ref 164–446)
PLATELET # BLD AUTO: 138 K/UL (ref 164–446)
PLATELET # BLD AUTO: 139 K/UL (ref 164–446)
PLATELET # BLD AUTO: 165 K/UL (ref 164–446)
PLATELET # BLD AUTO: 175 K/UL (ref 164–446)
PLATELET # BLD AUTO: 175 K/UL (ref 164–446)
PLATELET # BLD AUTO: 177 K/UL (ref 164–446)
PLATELET BLD QL SMEAR: NORMAL
PMV BLD AUTO: 10.1 FL (ref 9–12.9)
PMV BLD AUTO: 10.2 FL (ref 9–12.9)
PMV BLD AUTO: 10.3 FL (ref 9–12.9)
PMV BLD AUTO: 10.4 FL (ref 9–12.9)
PMV BLD AUTO: 10.6 FL (ref 9–12.9)
PMV BLD AUTO: 10.8 FL (ref 9–12.9)
PMV BLD AUTO: 11 FL (ref 9–12.9)
PMV BLD AUTO: 11.2 FL (ref 9–12.9)
PMV BLD AUTO: 11.4 FL (ref 9–12.9)
PMV BLD AUTO: 11.5 FL (ref 9–12.9)
PMV BLD AUTO: 13.7 FL (ref 9–12.9)
POIKILOCYTOSIS BLD QL SMEAR: NORMAL
POIKILOCYTOSIS BLD QL SMEAR: NORMAL
POTASSIUM SERPL-SCNC: 3.6 MMOL/L (ref 3.6–5.5)
POTASSIUM SERPL-SCNC: 3.7 MMOL/L (ref 3.6–5.5)
POTASSIUM SERPL-SCNC: 3.9 MMOL/L (ref 3.6–5.5)
POTASSIUM SERPL-SCNC: 4 MMOL/L (ref 3.6–5.5)
POTASSIUM SERPL-SCNC: 4.1 MMOL/L (ref 3.6–5.5)
POTASSIUM SERPL-SCNC: 4.1 MMOL/L (ref 3.6–5.5)
POTASSIUM SERPL-SCNC: 4.3 MMOL/L (ref 3.6–5.5)
POTASSIUM SERPL-SCNC: 4.5 MMOL/L (ref 3.6–5.5)
POTASSIUM SERPL-SCNC: 4.6 MMOL/L (ref 3.6–5.5)
PROCALCITONIN SERPL-MCNC: 0.18 NG/ML
PROPOXYPH UR QL SCN: NEGATIVE
PROT SERPL-MCNC: 6.4 G/DL (ref 6–8.2)
PROT SERPL-MCNC: 6.9 G/DL (ref 6–8.2)
PROT UR QL STRIP: NEGATIVE MG/DL
PROTHROMBIN TIME: 23 SEC (ref 12–14.6)
PROTHROMBIN TIME: 25.1 SEC (ref 12–14.6)
PROTHROMBIN TIME: 27.3 SEC (ref 12–14.6)
PROTHROMBIN TIME: 27.6 SEC (ref 12–14.6)
PROTHROMBIN TIME: 30.2 SEC (ref 12–14.6)
PROTHROMBIN TIME: 31.4 SEC (ref 12–14.6)
PROTHROMBIN TIME: 32.4 SEC (ref 12–14.6)
PROTHROMBIN TIME: 33.6 SEC (ref 12–14.6)
PROTHROMBIN TIME: 34.2 SEC (ref 12–14.6)
PROTHROMBIN TIME: 35.2 SEC (ref 12–14.6)
PROTHROMBIN TIME: 38.2 SEC (ref 12–14.6)
PROTHROMBIN TIME: 38.8 SEC (ref 12–14.6)
PROTHROMBIN TIME: 39.5 SEC (ref 12–14.6)
PROTHROMBIN TIME: 41 SEC (ref 12–14.6)
RBC # BLD AUTO: 3.6 M/UL (ref 4.7–6.1)
RBC # BLD AUTO: 3.61 M/UL (ref 4.7–6.1)
RBC # BLD AUTO: 3.65 M/UL (ref 4.7–6.1)
RBC # BLD AUTO: 3.69 M/UL (ref 4.7–6.1)
RBC # BLD AUTO: 3.85 M/UL (ref 4.7–6.1)
RBC # BLD AUTO: 3.94 M/UL (ref 4.7–6.1)
RBC # BLD AUTO: 4.02 M/UL (ref 4.7–6.1)
RBC # BLD AUTO: 4.21 M/UL (ref 4.7–6.1)
RBC # BLD AUTO: 4.37 M/UL (ref 4.7–6.1)
RBC # BLD AUTO: 4.49 M/UL (ref 4.7–6.1)
RBC # BLD AUTO: 4.58 M/UL (ref 4.7–6.1)
RBC BLD AUTO: NORMAL
RBC BLD AUTO: PRESENT
RBC UR QL AUTO: NEGATIVE
RSV RNA SPEC QL NAA+PROBE: NEGATIVE
SARS-COV-2 RNA RESP QL NAA+PROBE: NOTDETECTED
SARS-COV-2 RNA RESP QL NAA+PROBE: NOTDETECTED
SIGNIFICANT IND 70042: ABNORMAL
SIGNIFICANT IND 70042: NORMAL
SITE SITE: ABNORMAL
SITE SITE: NORMAL
SODIUM SERPL-SCNC: 129 MMOL/L (ref 135–145)
SODIUM SERPL-SCNC: 131 MMOL/L (ref 135–145)
SODIUM SERPL-SCNC: 133 MMOL/L (ref 135–145)
SODIUM SERPL-SCNC: 133 MMOL/L (ref 135–145)
SODIUM SERPL-SCNC: 134 MMOL/L (ref 135–145)
SODIUM SERPL-SCNC: 135 MMOL/L (ref 135–145)
SODIUM SERPL-SCNC: 136 MMOL/L (ref 135–145)
SODIUM SERPL-SCNC: 136 MMOL/L (ref 135–145)
SODIUM SERPL-SCNC: 137 MMOL/L (ref 135–145)
SOURCE SOURCE: ABNORMAL
SOURCE SOURCE: NORMAL
SP GR UR STRIP.AUTO: 1.03
SPECIMEN SOURCE: NORMAL
SPECIMEN SOURCE: NORMAL
TRIGL SERPL-MCNC: 217 MG/DL (ref 0–149)
TROPONIN T SERPL-MCNC: 18 NG/L (ref 6–19)
TROPONIN T SERPL-MCNC: 18 NG/L (ref 6–19)
TROPONIN T SERPL-MCNC: 22 NG/L (ref 6–19)
TROPONIN T SERPL-MCNC: 23 NG/L (ref 6–19)
TSH SERPL DL<=0.005 MIU/L-ACNC: 3.14 UIU/ML (ref 0.38–5.33)
UROBILINOGEN UR STRIP.AUTO-MCNC: 1 MG/DL
VANCOMYCIN SERPL-MCNC: 11.4 UG/ML
VANCOMYCIN TROUGH SERPL-MCNC: 14.4 UG/ML (ref 10–20)
WBC # BLD AUTO: 10.2 K/UL (ref 4.8–10.8)
WBC # BLD AUTO: 10.9 K/UL (ref 4.8–10.8)
WBC # BLD AUTO: 11.5 K/UL (ref 4.8–10.8)
WBC # BLD AUTO: 12.9 K/UL (ref 4.8–10.8)
WBC # BLD AUTO: 4.4 K/UL (ref 4.8–10.8)
WBC # BLD AUTO: 4.9 K/UL (ref 4.8–10.8)
WBC # BLD AUTO: 5.3 K/UL (ref 4.8–10.8)
WBC # BLD AUTO: 6.3 K/UL (ref 4.8–10.8)
WBC # BLD AUTO: 7.6 K/UL (ref 4.8–10.8)
WBC # BLD AUTO: 8 K/UL (ref 4.8–10.8)
WBC # BLD AUTO: 9.8 K/UL (ref 4.8–10.8)

## 2020-01-01 PROCEDURE — U0003 INFECTIOUS AGENT DETECTION BY NUCLEIC ACID (DNA OR RNA); SEVERE ACUTE RESPIRATORY SYNDROME CORONAVIRUS 2 (SARS-COV-2) (CORONAVIRUS DISEASE [COVID-19]), AMPLIFIED PROBE TECHNIQUE, MAKING USE OF HIGH THROUGHPUT TECHNOLOGIES AS DESCRIBED BY CMS-2020-01-R: HCPCS

## 2020-01-01 PROCEDURE — 700102 HCHG RX REV CODE 250 W/ 637 OVERRIDE(OP): Performed by: HOSPITALIST

## 2020-01-01 PROCEDURE — 700102 HCHG RX REV CODE 250 W/ 637 OVERRIDE(OP): Performed by: NURSE PRACTITIONER

## 2020-01-01 PROCEDURE — 99220 PR INITIAL OBSERVATION CARE,LEVL III: CPT | Mod: AI | Performed by: STUDENT IN AN ORGANIZED HEALTH CARE EDUCATION/TRAINING PROGRAM

## 2020-01-01 PROCEDURE — C9803 HOPD COVID-19 SPEC COLLECT: HCPCS | Performed by: INTERNAL MEDICINE

## 2020-01-01 PROCEDURE — 84484 ASSAY OF TROPONIN QUANT: CPT

## 2020-01-01 PROCEDURE — 700102 HCHG RX REV CODE 250 W/ 637 OVERRIDE(OP): Performed by: STUDENT IN AN ORGANIZED HEALTH CARE EDUCATION/TRAINING PROGRAM

## 2020-01-01 PROCEDURE — 99220 PR INITIAL OBSERVATION CARE,LEVL III: CPT | Mod: AI | Performed by: INTERNAL MEDICINE

## 2020-01-01 PROCEDURE — 770001 HCHG ROOM/CARE - MED/SURG/GYN PRIV*

## 2020-01-01 PROCEDURE — 85007 BL SMEAR W/DIFF WBC COUNT: CPT

## 2020-01-01 PROCEDURE — 700102 HCHG RX REV CODE 250 W/ 637 OVERRIDE(OP): Performed by: INTERNAL MEDICINE

## 2020-01-01 PROCEDURE — A9270 NON-COVERED ITEM OR SERVICE: HCPCS | Performed by: NURSE PRACTITIONER

## 2020-01-01 PROCEDURE — 700111 HCHG RX REV CODE 636 W/ 250 OVERRIDE (IP): Performed by: INTERNAL MEDICINE

## 2020-01-01 PROCEDURE — 80048 BASIC METABOLIC PNL TOTAL CA: CPT

## 2020-01-01 PROCEDURE — 700111 HCHG RX REV CODE 636 W/ 250 OVERRIDE (IP): Performed by: STUDENT IN AN ORGANIZED HEALTH CARE EDUCATION/TRAINING PROGRAM

## 2020-01-01 PROCEDURE — 71045 X-RAY EXAM CHEST 1 VIEW: CPT

## 2020-01-01 PROCEDURE — 96375 TX/PRO/DX INJ NEW DRUG ADDON: CPT

## 2020-01-01 PROCEDURE — A9270 NON-COVERED ITEM OR SERVICE: HCPCS | Performed by: INTERNAL MEDICINE

## 2020-01-01 PROCEDURE — 93005 ELECTROCARDIOGRAM TRACING: CPT | Performed by: EMERGENCY MEDICINE

## 2020-01-01 PROCEDURE — A9270 NON-COVERED ITEM OR SERVICE: HCPCS | Performed by: HOSPITALIST

## 2020-01-01 PROCEDURE — A9270 NON-COVERED ITEM OR SERVICE: HCPCS | Performed by: STUDENT IN AN ORGANIZED HEALTH CARE EDUCATION/TRAINING PROGRAM

## 2020-01-01 PROCEDURE — 87641 MR-STAPH DNA AMP PROBE: CPT

## 2020-01-01 PROCEDURE — G0378 HOSPITAL OBSERVATION PER HR: HCPCS

## 2020-01-01 PROCEDURE — 99232 SBSQ HOSP IP/OBS MODERATE 35: CPT | Mod: GC | Performed by: INTERNAL MEDICINE

## 2020-01-01 PROCEDURE — 36415 COLL VENOUS BLD VENIPUNCTURE: CPT

## 2020-01-01 PROCEDURE — 82533 TOTAL CORTISOL: CPT

## 2020-01-01 PROCEDURE — 99233 SBSQ HOSP IP/OBS HIGH 50: CPT | Performed by: HOSPITALIST

## 2020-01-01 PROCEDURE — 85025 COMPLETE CBC W/AUTO DIFF WBC: CPT

## 2020-01-01 PROCEDURE — 99217 PR OBSERVATION CARE DISCHARGE: CPT | Performed by: INTERNAL MEDICINE

## 2020-01-01 PROCEDURE — 770006 HCHG ROOM/CARE - MED/SURG/GYN SEMI*

## 2020-01-01 PROCEDURE — 700105 HCHG RX REV CODE 258: Performed by: STUDENT IN AN ORGANIZED HEALTH CARE EDUCATION/TRAINING PROGRAM

## 2020-01-01 PROCEDURE — 0241U HCHG SARS-COV-2 COVID-19 NFCT DS RESP RNA 4 TRGT MIC: CPT

## 2020-01-01 PROCEDURE — 80307 DRUG TEST PRSMV CHEM ANLYZR: CPT

## 2020-01-01 PROCEDURE — 80053 COMPREHEN METABOLIC PANEL: CPT

## 2020-01-01 PROCEDURE — 84145 PROCALCITONIN (PCT): CPT

## 2020-01-01 PROCEDURE — 87186 SC STD MICRODIL/AGAR DIL: CPT

## 2020-01-01 PROCEDURE — 84443 ASSAY THYROID STIM HORMONE: CPT

## 2020-01-01 PROCEDURE — 85610 PROTHROMBIN TIME: CPT

## 2020-01-01 PROCEDURE — 80202 ASSAY OF VANCOMYCIN: CPT

## 2020-01-01 PROCEDURE — 96367 TX/PROPH/DG ADDL SEQ IV INF: CPT

## 2020-01-01 PROCEDURE — 770020 HCHG ROOM/CARE - TELE (206)

## 2020-01-01 PROCEDURE — 85027 COMPLETE CBC AUTOMATED: CPT

## 2020-01-01 PROCEDURE — 94669 MECHANICAL CHEST WALL OSCILL: CPT

## 2020-01-01 PROCEDURE — 99232 SBSQ HOSP IP/OBS MODERATE 35: CPT | Performed by: INTERNAL MEDICINE

## 2020-01-01 PROCEDURE — 82962 GLUCOSE BLOOD TEST: CPT | Mod: 91

## 2020-01-01 PROCEDURE — 96365 THER/PROPH/DIAG IV INF INIT: CPT

## 2020-01-01 PROCEDURE — 87040 BLOOD CULTURE FOR BACTERIA: CPT | Mod: 91

## 2020-01-01 PROCEDURE — 700101 HCHG RX REV CODE 250

## 2020-01-01 PROCEDURE — 83605 ASSAY OF LACTIC ACID: CPT

## 2020-01-01 PROCEDURE — 700111 HCHG RX REV CODE 636 W/ 250 OVERRIDE (IP): Performed by: HOSPITALIST

## 2020-01-01 PROCEDURE — 99233 SBSQ HOSP IP/OBS HIGH 50: CPT | Performed by: STUDENT IN AN ORGANIZED HEALTH CARE EDUCATION/TRAINING PROGRAM

## 2020-01-01 PROCEDURE — 87077 CULTURE AEROBIC IDENTIFY: CPT | Mod: 91

## 2020-01-01 PROCEDURE — 700111 HCHG RX REV CODE 636 W/ 250 OVERRIDE (IP): Performed by: EMERGENCY MEDICINE

## 2020-01-01 PROCEDURE — 96374 THER/PROPH/DIAG INJ IV PUSH: CPT

## 2020-01-01 PROCEDURE — 83735 ASSAY OF MAGNESIUM: CPT

## 2020-01-01 PROCEDURE — 93306 TTE W/DOPPLER COMPLETE: CPT | Mod: 26 | Performed by: STUDENT IN AN ORGANIZED HEALTH CARE EDUCATION/TRAINING PROGRAM

## 2020-01-01 PROCEDURE — 99214 OFFICE O/P EST MOD 30 MIN: CPT | Performed by: NURSE PRACTITIONER

## 2020-01-01 PROCEDURE — 99239 HOSP IP/OBS DSCHRG MGMT >30: CPT | Performed by: INTERNAL MEDICINE

## 2020-01-01 PROCEDURE — 83036 HEMOGLOBIN GLYCOSYLATED A1C: CPT

## 2020-01-01 PROCEDURE — C9803 HOPD COVID-19 SPEC COLLECT: HCPCS | Performed by: STUDENT IN AN ORGANIZED HEALTH CARE EDUCATION/TRAINING PROGRAM

## 2020-01-01 PROCEDURE — 700105 HCHG RX REV CODE 258: Performed by: HOSPITALIST

## 2020-01-01 PROCEDURE — 82962 GLUCOSE BLOOD TEST: CPT

## 2020-01-01 PROCEDURE — 84100 ASSAY OF PHOSPHORUS: CPT

## 2020-01-01 PROCEDURE — 93005 ELECTROCARDIOGRAM TRACING: CPT

## 2020-01-01 PROCEDURE — 93010 ELECTROCARDIOGRAM REPORT: CPT | Performed by: INTERNAL MEDICINE

## 2020-01-01 PROCEDURE — 99232 SBSQ HOSP IP/OBS MODERATE 35: CPT | Performed by: HOSPITALIST

## 2020-01-01 PROCEDURE — 81003 URINALYSIS AUTO W/O SCOPE: CPT

## 2020-01-01 PROCEDURE — 99225 PR SUBSEQUENT OBSERVATION CARE,LEVEL II: CPT | Performed by: INTERNAL MEDICINE

## 2020-01-01 PROCEDURE — 700101 HCHG RX REV CODE 250: Performed by: STUDENT IN AN ORGANIZED HEALTH CARE EDUCATION/TRAINING PROGRAM

## 2020-01-01 PROCEDURE — 93005 ELECTROCARDIOGRAM TRACING: CPT | Performed by: STUDENT IN AN ORGANIZED HEALTH CARE EDUCATION/TRAINING PROGRAM

## 2020-01-01 PROCEDURE — 87205 SMEAR GRAM STAIN: CPT

## 2020-01-01 PROCEDURE — 97161 PT EVAL LOW COMPLEX 20 MIN: CPT

## 2020-01-01 PROCEDURE — 96376 TX/PRO/DX INJ SAME DRUG ADON: CPT

## 2020-01-01 PROCEDURE — 99285 EMERGENCY DEPT VISIT HI MDM: CPT

## 2020-01-01 PROCEDURE — 97165 OT EVAL LOW COMPLEX 30 MIN: CPT

## 2020-01-01 PROCEDURE — 83880 ASSAY OF NATRIURETIC PEPTIDE: CPT

## 2020-01-01 PROCEDURE — 700111 HCHG RX REV CODE 636 W/ 250 OVERRIDE (IP): Performed by: NURSE PRACTITIONER

## 2020-01-01 PROCEDURE — 700105 HCHG RX REV CODE 258: Performed by: NURSE PRACTITIONER

## 2020-01-01 PROCEDURE — 94640 AIRWAY INHALATION TREATMENT: CPT

## 2020-01-01 PROCEDURE — 96372 THER/PROPH/DIAG INJ SC/IM: CPT

## 2020-01-01 PROCEDURE — 99212 OFFICE O/P EST SF 10 MIN: CPT

## 2020-01-01 PROCEDURE — 99233 SBSQ HOSP IP/OBS HIGH 50: CPT | Mod: GC | Performed by: INTERNAL MEDICINE

## 2020-01-01 PROCEDURE — 93971 EXTREMITY STUDY: CPT | Mod: LT

## 2020-01-01 PROCEDURE — 700105 HCHG RX REV CODE 258: Performed by: EMERGENCY MEDICINE

## 2020-01-01 PROCEDURE — 87040 BLOOD CULTURE FOR BACTERIA: CPT

## 2020-01-01 PROCEDURE — 73700 CT LOWER EXTREMITY W/O DYE: CPT | Mod: LT

## 2020-01-01 PROCEDURE — 94660 CPAP INITIATION&MGMT: CPT

## 2020-01-01 PROCEDURE — 87070 CULTURE OTHR SPECIMN AEROBIC: CPT

## 2020-01-01 PROCEDURE — 96366 THER/PROPH/DIAG IV INF ADDON: CPT

## 2020-01-01 PROCEDURE — 70450 CT HEAD/BRAIN W/O DYE: CPT

## 2020-01-01 PROCEDURE — 700101 HCHG RX REV CODE 250: Performed by: NURSE PRACTITIONER

## 2020-01-01 PROCEDURE — 99211 OFF/OP EST MAY X REQ PHY/QHP: CPT | Performed by: NURSE PRACTITIONER

## 2020-01-01 PROCEDURE — 85379 FIBRIN DEGRADATION QUANT: CPT

## 2020-01-01 PROCEDURE — 93306 TTE W/DOPPLER COMPLETE: CPT

## 2020-01-01 PROCEDURE — 99212 OFFICE O/P EST SF 10 MIN: CPT | Performed by: NURSE PRACTITIONER

## 2020-01-01 PROCEDURE — A9502 TC99M TETROFOSMIN: HCPCS

## 2020-01-01 PROCEDURE — 80061 LIPID PANEL: CPT

## 2020-01-01 PROCEDURE — 87086 URINE CULTURE/COLONY COUNT: CPT

## 2020-01-01 PROCEDURE — 700111 HCHG RX REV CODE 636 W/ 250 OVERRIDE (IP)

## 2020-01-01 PROCEDURE — 93005 ELECTROCARDIOGRAM TRACING: CPT | Performed by: HOSPITALIST

## 2020-01-01 RX ORDER — DEXTROMETHORPHAN HBR. AND GUAIFENESIN 10; 100 MG/5ML; MG/5ML
10 SOLUTION ORAL EVERY 6 HOURS PRN
Status: DISCONTINUED | OUTPATIENT
Start: 2020-01-01 | End: 2020-01-01 | Stop reason: HOSPADM

## 2020-01-01 RX ORDER — WARFARIN SODIUM 4 MG/1
4 TABLET ORAL
Status: DISCONTINUED | OUTPATIENT
Start: 2020-01-01 | End: 2020-01-01

## 2020-01-01 RX ORDER — LISINOPRIL 5 MG/1
2.5 TABLET ORAL EVERY EVENING
Status: DISCONTINUED | OUTPATIENT
Start: 2020-01-01 | End: 2020-01-01 | Stop reason: HOSPADM

## 2020-01-01 RX ORDER — DIPHENHYDRAMINE HCL 25 MG
CAPSULE ORAL
Qty: 30 CAP | Refills: 1 | Status: SHIPPED | OUTPATIENT
Start: 2020-01-01 | End: 2020-01-01

## 2020-01-01 RX ORDER — POLYETHYLENE GLYCOL 3350 17 G/17G
1 POWDER, FOR SOLUTION ORAL
Status: DISCONTINUED | OUTPATIENT
Start: 2020-01-01 | End: 2020-01-01 | Stop reason: HOSPADM

## 2020-01-01 RX ORDER — DIPHENHYDRAMINE HCL 25 MG
25 TABLET ORAL NIGHTLY PRN
Status: DISCONTINUED | OUTPATIENT
Start: 2020-01-01 | End: 2020-01-01 | Stop reason: HOSPADM

## 2020-01-01 RX ORDER — COLCHICINE 0.6 MG/1
0.6 TABLET ORAL 2 TIMES DAILY
Qty: 14 TAB | Refills: 0 | Status: ON HOLD | OUTPATIENT
Start: 2020-01-01 | End: 2021-01-01

## 2020-01-01 RX ORDER — METOPROLOL TARTRATE 1 MG/ML
5 INJECTION, SOLUTION INTRAVENOUS EVERY 6 HOURS
Status: DISCONTINUED | OUTPATIENT
Start: 2020-01-01 | End: 2020-01-01

## 2020-01-01 RX ORDER — SULFAMETHOXAZOLE AND TRIMETHOPRIM 800; 160 MG/1; MG/1
1 TABLET ORAL 2 TIMES DAILY
Qty: 10 TAB | Refills: 0 | Status: SHIPPED | OUTPATIENT
Start: 2020-01-01 | End: 2020-01-01

## 2020-01-01 RX ORDER — AMOXICILLIN 250 MG
2 CAPSULE ORAL 2 TIMES DAILY
Status: DISCONTINUED | OUTPATIENT
Start: 2020-01-01 | End: 2020-01-01 | Stop reason: HOSPADM

## 2020-01-01 RX ORDER — ONDANSETRON 2 MG/ML
4 INJECTION INTRAMUSCULAR; INTRAVENOUS EVERY 4 HOURS PRN
Status: DISCONTINUED | OUTPATIENT
Start: 2020-01-01 | End: 2020-01-01

## 2020-01-01 RX ORDER — OXYCODONE HYDROCHLORIDE 5 MG/1
5 TABLET ORAL EVERY 6 HOURS PRN
Status: DISCONTINUED | OUTPATIENT
Start: 2020-01-01 | End: 2020-01-01 | Stop reason: HOSPADM

## 2020-01-01 RX ORDER — FUROSEMIDE 20 MG/1
20 TABLET ORAL
Status: DISCONTINUED | OUTPATIENT
Start: 2020-01-01 | End: 2020-01-01 | Stop reason: HOSPADM

## 2020-01-01 RX ORDER — ALUMINA, MAGNESIA, AND SIMETHICONE 2400; 2400; 240 MG/30ML; MG/30ML; MG/30ML
30 SUSPENSION ORAL EVERY 4 HOURS PRN
Status: DISCONTINUED | OUTPATIENT
Start: 2020-01-01 | End: 2020-01-01

## 2020-01-01 RX ORDER — WARFARIN SODIUM 5 MG/1
5 TABLET ORAL SEE ADMIN INSTRUCTIONS
Qty: 24 TAB | Refills: 0 | Status: ON HOLD | OUTPATIENT
Start: 2020-01-01 | End: 2021-01-01

## 2020-01-01 RX ORDER — WARFARIN SODIUM 2 MG/1
2 TABLET ORAL
Status: DISCONTINUED | OUTPATIENT
Start: 2020-01-01 | End: 2020-01-01

## 2020-01-01 RX ORDER — METOPROLOL TARTRATE 50 MG/1
50 TABLET, FILM COATED ORAL TWICE DAILY
Status: DISCONTINUED | OUTPATIENT
Start: 2020-01-01 | End: 2020-01-01 | Stop reason: HOSPADM

## 2020-01-01 RX ORDER — METOPROLOL TARTRATE 1 MG/ML
INJECTION, SOLUTION INTRAVENOUS
Status: COMPLETED
Start: 2020-01-01 | End: 2020-01-01

## 2020-01-01 RX ORDER — CETIRIZINE HYDROCHLORIDE 10 MG/1
10 TABLET ORAL EVERY MORNING
Status: ON HOLD | COMMUNITY
End: 2021-01-01

## 2020-01-01 RX ORDER — ONDANSETRON 4 MG/1
4 TABLET, ORALLY DISINTEGRATING ORAL EVERY 4 HOURS PRN
Status: DISCONTINUED | OUTPATIENT
Start: 2020-01-01 | End: 2020-01-01

## 2020-01-01 RX ORDER — OXYCODONE HYDROCHLORIDE 5 MG/1
5-10 TABLET ORAL EVERY 4 HOURS PRN
Status: DISCONTINUED | OUTPATIENT
Start: 2020-01-01 | End: 2020-01-01 | Stop reason: HOSPADM

## 2020-01-01 RX ORDER — METOPROLOL TARTRATE 1 MG/ML
2.5 INJECTION, SOLUTION INTRAVENOUS EVERY 6 HOURS PRN
Status: DISCONTINUED | OUTPATIENT
Start: 2020-01-01 | End: 2020-01-01 | Stop reason: HOSPADM

## 2020-01-01 RX ORDER — TAMSULOSIN HYDROCHLORIDE 0.4 MG/1
0.4 CAPSULE ORAL
Status: DISCONTINUED | OUTPATIENT
Start: 2020-01-01 | End: 2020-01-01 | Stop reason: HOSPADM

## 2020-01-01 RX ORDER — REGADENOSON 0.08 MG/ML
0.4 INJECTION, SOLUTION INTRAVENOUS
Status: COMPLETED | OUTPATIENT
Start: 2020-01-01 | End: 2020-01-01

## 2020-01-01 RX ORDER — ALLOPURINOL 100 MG/1
100 TABLET ORAL EVERY MORNING
Status: DISCONTINUED | OUTPATIENT
Start: 2020-01-01 | End: 2020-01-01 | Stop reason: HOSPADM

## 2020-01-01 RX ORDER — HYDROXYZINE HYDROCHLORIDE 25 MG/1
25 TABLET, FILM COATED ORAL 3 TIMES DAILY PRN
Qty: 30 TAB | Refills: 0 | Status: ON HOLD | OUTPATIENT
Start: 2020-01-01 | End: 2021-01-01

## 2020-01-01 RX ORDER — DIPHENHYDRAMINE HCL 25 MG
25 TABLET ORAL EVERY 6 HOURS PRN
Status: DISCONTINUED | OUTPATIENT
Start: 2020-01-01 | End: 2020-01-01 | Stop reason: HOSPADM

## 2020-01-01 RX ORDER — LINEZOLID 600 MG/1
600 TABLET, FILM COATED ORAL EVERY 12 HOURS
Status: COMPLETED | OUTPATIENT
Start: 2020-01-01 | End: 2020-01-01

## 2020-01-01 RX ORDER — ALBUTEROL SULFATE 90 UG/1
2 AEROSOL, METERED RESPIRATORY (INHALATION)
Status: DISCONTINUED | OUTPATIENT
Start: 2020-01-01 | End: 2020-01-01 | Stop reason: HOSPADM

## 2020-01-01 RX ORDER — POTASSIUM CHLORIDE 20 MEQ/1
40 TABLET, EXTENDED RELEASE ORAL ONCE
Status: COMPLETED | OUTPATIENT
Start: 2020-01-01 | End: 2020-01-01

## 2020-01-01 RX ORDER — HEPARIN SODIUM 5000 [USP'U]/ML
5000 INJECTION, SOLUTION INTRAVENOUS; SUBCUTANEOUS EVERY 8 HOURS
Status: DISCONTINUED | OUTPATIENT
Start: 2020-01-01 | End: 2020-01-01

## 2020-01-01 RX ORDER — FUROSEMIDE 20 MG/1
20 TABLET ORAL DAILY
Qty: 14 TAB | Refills: 0 | Status: ON HOLD | OUTPATIENT
Start: 2020-01-01 | End: 2021-01-01

## 2020-01-01 RX ORDER — WARFARIN SODIUM 5 MG/1
5 TABLET ORAL
Status: DISCONTINUED | OUTPATIENT
Start: 2020-01-01 | End: 2020-01-01

## 2020-01-01 RX ORDER — TAMSULOSIN HYDROCHLORIDE 0.4 MG/1
0.4 CAPSULE ORAL EVERY EVENING
Status: DISCONTINUED | OUTPATIENT
Start: 2020-01-01 | End: 2020-01-01 | Stop reason: HOSPADM

## 2020-01-01 RX ORDER — METOPROLOL SUCCINATE 25 MG/1
25 TABLET, EXTENDED RELEASE ORAL
Status: DISCONTINUED | OUTPATIENT
Start: 2020-01-01 | End: 2020-01-01

## 2020-01-01 RX ORDER — SODIUM CHLORIDE 9 MG/ML
250 INJECTION, SOLUTION INTRAVENOUS ONCE
Status: COMPLETED | OUTPATIENT
Start: 2020-01-01 | End: 2020-01-01

## 2020-01-01 RX ORDER — ALBUTEROL SULFATE 90 UG/1
2 AEROSOL, METERED RESPIRATORY (INHALATION)
Status: DISCONTINUED | OUTPATIENT
Start: 2020-01-01 | End: 2020-01-01

## 2020-01-01 RX ORDER — METOPROLOL TARTRATE 1 MG/ML
2.5 INJECTION, SOLUTION INTRAVENOUS ONCE
Status: COMPLETED | OUTPATIENT
Start: 2020-01-01 | End: 2020-01-01

## 2020-01-01 RX ORDER — ACETAMINOPHEN 325 MG/1
650 TABLET ORAL EVERY 6 HOURS PRN
Status: DISCONTINUED | OUTPATIENT
Start: 2020-01-01 | End: 2020-01-01 | Stop reason: HOSPADM

## 2020-01-01 RX ORDER — SODIUM CHLORIDE 9 MG/ML
1500 INJECTION, SOLUTION INTRAVENOUS ONCE
Status: COMPLETED | OUTPATIENT
Start: 2020-01-01 | End: 2020-01-01

## 2020-01-01 RX ORDER — CETIRIZINE HYDROCHLORIDE 10 MG/1
10 TABLET ORAL ONCE
Status: COMPLETED | OUTPATIENT
Start: 2020-01-01 | End: 2020-01-01

## 2020-01-01 RX ORDER — CEPHALEXIN 500 MG/1
500 CAPSULE ORAL EVERY 6 HOURS
Status: DISCONTINUED | OUTPATIENT
Start: 2020-01-01 | End: 2020-01-01

## 2020-01-01 RX ORDER — SODIUM CHLORIDE, SODIUM LACTATE, POTASSIUM CHLORIDE, CALCIUM CHLORIDE 600; 310; 30; 20 MG/100ML; MG/100ML; MG/100ML; MG/100ML
1000 INJECTION, SOLUTION INTRAVENOUS ONCE
Status: COMPLETED | OUTPATIENT
Start: 2020-01-01 | End: 2020-01-01

## 2020-01-01 RX ORDER — METOPROLOL TARTRATE 1 MG/ML
2.5 INJECTION, SOLUTION INTRAVENOUS
Status: COMPLETED | OUTPATIENT
Start: 2020-01-01 | End: 2020-01-01

## 2020-01-01 RX ORDER — METOPROLOL SUCCINATE 50 MG/1
50 TABLET, EXTENDED RELEASE ORAL EVERY MORNING
Status: DISCONTINUED | OUTPATIENT
Start: 2020-01-01 | End: 2020-01-01 | Stop reason: HOSPADM

## 2020-01-01 RX ORDER — CHOLECALCIFEROL (VITAMIN D3) 125 MCG
5 CAPSULE ORAL
Status: DISCONTINUED | OUTPATIENT
Start: 2020-01-01 | End: 2020-01-01 | Stop reason: HOSPADM

## 2020-01-01 RX ORDER — MORPHINE SULFATE 4 MG/ML
1-2 INJECTION, SOLUTION INTRAMUSCULAR; INTRAVENOUS
Status: DISCONTINUED | OUTPATIENT
Start: 2020-01-01 | End: 2020-01-01 | Stop reason: HOSPADM

## 2020-01-01 RX ORDER — METOPROLOL SUCCINATE 50 MG/1
50 TABLET, EXTENDED RELEASE ORAL EVERY MORNING
Status: DISCONTINUED | OUTPATIENT
Start: 2020-01-01 | End: 2020-01-01

## 2020-01-01 RX ORDER — DOXYCYCLINE 100 MG/1
100 TABLET ORAL EVERY 12 HOURS
Status: DISCONTINUED | OUTPATIENT
Start: 2020-01-01 | End: 2020-01-01

## 2020-01-01 RX ORDER — HYDROXYZINE HYDROCHLORIDE 25 MG/1
25 TABLET, FILM COATED ORAL 3 TIMES DAILY PRN
Status: DISCONTINUED | OUTPATIENT
Start: 2020-01-01 | End: 2020-01-01 | Stop reason: HOSPADM

## 2020-01-01 RX ORDER — NITROGLYCERIN 0.4 MG/1
0.4 TABLET SUBLINGUAL
Status: DISCONTINUED | OUTPATIENT
Start: 2020-01-01 | End: 2020-01-01 | Stop reason: HOSPADM

## 2020-01-01 RX ORDER — POTASSIUM CHLORIDE 20 MEQ/1
20 TABLET, EXTENDED RELEASE ORAL DAILY
Status: DISCONTINUED | OUTPATIENT
Start: 2020-01-01 | End: 2020-01-01 | Stop reason: HOSPADM

## 2020-01-01 RX ORDER — DEXTROSE MONOHYDRATE 25 G/50ML
50 INJECTION, SOLUTION INTRAVENOUS
Status: DISCONTINUED | OUTPATIENT
Start: 2020-01-01 | End: 2020-01-01 | Stop reason: HOSPADM

## 2020-01-01 RX ORDER — BENZONATATE 100 MG/1
100 CAPSULE ORAL 3 TIMES DAILY PRN
Qty: 60 CAP | Refills: 0 | Status: ON HOLD | OUTPATIENT
Start: 2020-01-01 | End: 2021-01-01

## 2020-01-01 RX ORDER — BENZONATATE 100 MG/1
100 CAPSULE ORAL 3 TIMES DAILY PRN
Status: DISCONTINUED | OUTPATIENT
Start: 2020-01-01 | End: 2020-01-01 | Stop reason: HOSPADM

## 2020-01-01 RX ORDER — WARFARIN SODIUM 5 MG/1
5 TABLET ORAL
Status: COMPLETED | OUTPATIENT
Start: 2020-01-01 | End: 2020-01-01

## 2020-01-01 RX ORDER — CHOLECALCIFEROL (VITAMIN D3) 125 MCG
5 CAPSULE ORAL NIGHTLY
Status: DISCONTINUED | OUTPATIENT
Start: 2020-01-01 | End: 2020-01-01 | Stop reason: HOSPADM

## 2020-01-01 RX ORDER — SODIUM CHLORIDE 9 MG/ML
1000 INJECTION, SOLUTION INTRAVENOUS ONCE
Status: DISCONTINUED | OUTPATIENT
Start: 2020-01-01 | End: 2020-01-01

## 2020-01-01 RX ORDER — WARFARIN SODIUM 2 MG/1
2 TABLET ORAL
Status: DISCONTINUED | OUTPATIENT
Start: 2020-01-01 | End: 2020-01-01 | Stop reason: HOSPADM

## 2020-01-01 RX ORDER — LANOLIN ALCOHOL/MO/W.PET/CERES
CREAM (GRAM) TOPICAL 3 TIMES DAILY PRN
Status: DISCONTINUED | OUTPATIENT
Start: 2020-01-01 | End: 2020-01-01 | Stop reason: HOSPADM

## 2020-01-01 RX ORDER — WARFARIN SODIUM 7.5 MG/1
7.5 TABLET ORAL
Status: COMPLETED | OUTPATIENT
Start: 2020-01-01 | End: 2020-01-01

## 2020-01-01 RX ORDER — CETIRIZINE HYDROCHLORIDE 10 MG/1
10 TABLET ORAL DAILY
Status: DISCONTINUED | OUTPATIENT
Start: 2020-01-01 | End: 2020-01-01 | Stop reason: HOSPADM

## 2020-01-01 RX ORDER — COLCHICINE 0.6 MG/1
0.6 TABLET ORAL 2 TIMES DAILY
Status: DISCONTINUED | OUTPATIENT
Start: 2020-01-01 | End: 2020-01-01 | Stop reason: HOSPADM

## 2020-01-01 RX ORDER — PROCHLORPERAZINE EDISYLATE 5 MG/ML
10 INJECTION INTRAMUSCULAR; INTRAVENOUS EVERY 6 HOURS PRN
Status: DISCONTINUED | OUTPATIENT
Start: 2020-01-01 | End: 2020-01-01 | Stop reason: HOSPADM

## 2020-01-01 RX ORDER — ACETAMINOPHEN 500 MG
1000 TABLET ORAL EVERY 6 HOURS PRN
Status: ON HOLD | COMMUNITY
End: 2021-01-01

## 2020-01-01 RX ORDER — WARFARIN SODIUM 2 MG/1
2 TABLET ORAL SEE ADMIN INSTRUCTIONS
Qty: 8 TAB | Refills: 0 | Status: ON HOLD | OUTPATIENT
Start: 2020-01-01 | End: 2021-01-01

## 2020-01-01 RX ORDER — BISACODYL 10 MG
10 SUPPOSITORY, RECTAL RECTAL
Status: DISCONTINUED | OUTPATIENT
Start: 2020-01-01 | End: 2020-01-01 | Stop reason: HOSPADM

## 2020-01-01 RX ORDER — WARFARIN SODIUM 5 MG/1
5 TABLET ORAL
Status: DISCONTINUED | OUTPATIENT
Start: 2020-01-01 | End: 2020-01-01 | Stop reason: HOSPADM

## 2020-01-01 RX ORDER — AMINOPHYLLINE 25 MG/ML
100 INJECTION, SOLUTION INTRAVENOUS
Status: DISCONTINUED | OUTPATIENT
Start: 2020-01-01 | End: 2020-01-01 | Stop reason: HOSPADM

## 2020-01-01 RX ORDER — LISINOPRIL 2.5 MG/1
2.5 TABLET ORAL EVERY EVENING
Status: ON HOLD | COMMUNITY
End: 2021-01-01

## 2020-01-01 RX ORDER — REGADENOSON 0.08 MG/ML
INJECTION, SOLUTION INTRAVENOUS
Status: COMPLETED
Start: 2020-01-01 | End: 2020-01-01

## 2020-01-01 RX ORDER — WARFARIN SODIUM 3 MG/1
3 TABLET ORAL DAILY
Status: DISCONTINUED | OUTPATIENT
Start: 2020-01-01 | End: 2020-01-01

## 2020-01-01 RX ORDER — WARFARIN SODIUM 5 MG/1
TABLET ORAL
Qty: 90 TAB | Refills: 1 | Status: ON HOLD | OUTPATIENT
Start: 2020-01-01 | End: 2020-01-01 | Stop reason: SDUPTHER

## 2020-01-01 RX ORDER — ALBUTEROL SULFATE 90 UG/1
2 AEROSOL, METERED RESPIRATORY (INHALATION) EVERY 6 HOURS PRN
Status: DISCONTINUED | OUTPATIENT
Start: 2020-01-01 | End: 2020-01-01 | Stop reason: HOSPADM

## 2020-01-01 RX ADMIN — SKIN PROTECTANT: 33 OINTMENT TOPICAL at 16:03

## 2020-01-01 RX ADMIN — COLCHICINE 0.6 MG: 0.6 TABLET, FILM COATED ORAL at 04:28

## 2020-01-01 RX ADMIN — BENZONATATE 100 MG: 100 CAPSULE ORAL at 16:00

## 2020-01-01 RX ADMIN — OXYCODONE HYDROCHLORIDE 10 MG: 5 TABLET ORAL at 17:14

## 2020-01-01 RX ADMIN — LINEZOLID 600 MG: 600 TABLET, FILM COATED ORAL at 17:27

## 2020-01-01 RX ADMIN — ALLOPURINOL 100 MG: 100 TABLET ORAL at 04:33

## 2020-01-01 RX ADMIN — OXYCODONE HYDROCHLORIDE 10 MG: 5 TABLET ORAL at 14:08

## 2020-01-01 RX ADMIN — INSULIN HUMAN 2 UNITS: 100 INJECTION, SOLUTION PARENTERAL at 21:51

## 2020-01-01 RX ADMIN — DEXTROMETHORPHAN HBR, GUAIFENESIN 10 ML: 20; 200 SOLUTION ORAL at 05:38

## 2020-01-01 RX ADMIN — POTASSIUM CHLORIDE 40 MEQ: 1500 TABLET, EXTENDED RELEASE ORAL at 07:49

## 2020-01-01 RX ADMIN — OXYCODONE HYDROCHLORIDE 5 MG: 5 TABLET ORAL at 11:14

## 2020-01-01 RX ADMIN — VANCOMYCIN HYDROCHLORIDE 1500 MG: 500 INJECTION, POWDER, LYOPHILIZED, FOR SOLUTION INTRAVENOUS at 05:59

## 2020-01-01 RX ADMIN — Medication 5 MG: at 21:07

## 2020-01-01 RX ADMIN — LISINOPRIL 2.5 MG: 2.5 TABLET ORAL at 17:59

## 2020-01-01 RX ADMIN — BENZONATATE 100 MG: 100 CAPSULE ORAL at 17:29

## 2020-01-01 RX ADMIN — OXYCODONE HYDROCHLORIDE 10 MG: 5 TABLET ORAL at 03:06

## 2020-01-01 RX ADMIN — DOCUSATE SODIUM 50 MG AND SENNOSIDES 8.6 MG 2 TABLET: 8.6; 5 TABLET, FILM COATED ORAL at 06:01

## 2020-01-01 RX ADMIN — ALBUTEROL SULFATE 2 PUFF: 90 AEROSOL, METERED RESPIRATORY (INHALATION) at 22:17

## 2020-01-01 RX ADMIN — TAMSULOSIN HYDROCHLORIDE 0.4 MG: 0.4 CAPSULE ORAL at 17:24

## 2020-01-01 RX ADMIN — ALBUTEROL SULFATE 2 PUFF: 90 AEROSOL, METERED RESPIRATORY (INHALATION) at 23:55

## 2020-01-01 RX ADMIN — AMPICILLIN AND SULBACTAM 3 G: 2; 1 INJECTION, POWDER, FOR SOLUTION INTRAVENOUS at 12:00

## 2020-01-01 RX ADMIN — AMPICILLIN SODIUM AND SULBACTAM SODIUM 3 G: 2; 1 INJECTION, POWDER, FOR SOLUTION INTRAMUSCULAR; INTRAVENOUS at 23:44

## 2020-01-01 RX ADMIN — AMPICILLIN SODIUM AND SULBACTAM SODIUM 3 G: 2; 1 INJECTION, POWDER, FOR SOLUTION INTRAMUSCULAR; INTRAVENOUS at 11:32

## 2020-01-01 RX ADMIN — ALBUTEROL SULFATE 2 PUFF: 90 AEROSOL, METERED RESPIRATORY (INHALATION) at 01:40

## 2020-01-01 RX ADMIN — POTASSIUM CHLORIDE 20 MEQ: 1500 TABLET, EXTENDED RELEASE ORAL at 04:45

## 2020-01-01 RX ADMIN — METOPROLOL TARTRATE 2.5 MG: 5 INJECTION, SOLUTION INTRAVENOUS at 14:03

## 2020-01-01 RX ADMIN — METFORMIN HYDROCHLORIDE 500 MG: 500 TABLET ORAL at 17:27

## 2020-01-01 RX ADMIN — METOPROLOL TARTRATE 50 MG: 50 TABLET, FILM COATED ORAL at 04:43

## 2020-01-01 RX ADMIN — ALBUTEROL SULFATE 2.5 MG: 2.5 SOLUTION RESPIRATORY (INHALATION) at 02:44

## 2020-01-01 RX ADMIN — OXYCODONE HYDROCHLORIDE 10 MG: 5 TABLET ORAL at 13:59

## 2020-01-01 RX ADMIN — DOXYCYCLINE 100 MG: 100 TABLET, FILM COATED ORAL at 17:49

## 2020-01-01 RX ADMIN — AMPICILLIN SODIUM AND SULBACTAM SODIUM 3 G: 2; 1 INJECTION, POWDER, FOR SOLUTION INTRAMUSCULAR; INTRAVENOUS at 23:40

## 2020-01-01 RX ADMIN — Medication 5 MG: at 20:13

## 2020-01-01 RX ADMIN — Medication 5 MG: at 20:36

## 2020-01-01 RX ADMIN — METOPROLOL TARTRATE 50 MG: 50 TABLET, FILM COATED ORAL at 04:34

## 2020-01-01 RX ADMIN — COLCHICINE 0.6 MG: 0.6 TABLET, FILM COATED ORAL at 20:47

## 2020-01-01 RX ADMIN — OXYCODONE HYDROCHLORIDE 10 MG: 5 TABLET ORAL at 23:46

## 2020-01-01 RX ADMIN — METOPROLOL TARTRATE 50 MG: 50 TABLET, FILM COATED ORAL at 04:32

## 2020-01-01 RX ADMIN — METOPROLOL TARTRATE 2.5 MG: 5 INJECTION, SOLUTION INTRAVENOUS at 13:24

## 2020-01-01 RX ADMIN — ALLOPURINOL 100 MG: 100 TABLET ORAL at 16:01

## 2020-01-01 RX ADMIN — DOCUSATE SODIUM 50 MG AND SENNOSIDES 8.6 MG 2 TABLET: 8.6; 5 TABLET, FILM COATED ORAL at 20:04

## 2020-01-01 RX ADMIN — DOCUSATE SODIUM 50 MG AND SENNOSIDES 8.6 MG 2 TABLET: 8.6; 5 TABLET, FILM COATED ORAL at 17:30

## 2020-01-01 RX ADMIN — CETIRIZINE HYDROCHLORIDE 10 MG: 10 TABLET, FILM COATED ORAL at 04:33

## 2020-01-01 RX ADMIN — CEPHALEXIN 500 MG: 500 CAPSULE ORAL at 05:36

## 2020-01-01 RX ADMIN — METFORMIN HYDROCHLORIDE 500 MG: 500 TABLET ORAL at 16:01

## 2020-01-01 RX ADMIN — OXYCODONE HYDROCHLORIDE 10 MG: 5 TABLET ORAL at 02:17

## 2020-01-01 RX ADMIN — AMPICILLIN SODIUM AND SULBACTAM SODIUM 3 G: 2; 1 INJECTION, POWDER, FOR SOLUTION INTRAMUSCULAR; INTRAVENOUS at 17:23

## 2020-01-01 RX ADMIN — HYDROXYZINE HYDROCHLORIDE 25 MG: 25 TABLET, FILM COATED ORAL at 16:29

## 2020-01-01 RX ADMIN — FUROSEMIDE 20 MG: 20 TABLET ORAL at 04:44

## 2020-01-01 RX ADMIN — Medication 5 MG: at 20:47

## 2020-01-01 RX ADMIN — OXYCODONE HYDROCHLORIDE 5 MG: 5 TABLET ORAL at 23:43

## 2020-01-01 RX ADMIN — DIPHENHYDRAMINE HYDROCHLORIDE 25 MG: 25 TABLET ORAL at 05:38

## 2020-01-01 RX ADMIN — BENZONATATE 100 MG: 100 CAPSULE ORAL at 02:55

## 2020-01-01 RX ADMIN — CETIRIZINE HYDROCHLORIDE 10 MG: 10 TABLET, FILM COATED ORAL at 00:00

## 2020-01-01 RX ADMIN — DEXTROMETHORPHAN HBR, GUAIFENESIN 10 ML: 20; 200 SOLUTION ORAL at 03:07

## 2020-01-01 RX ADMIN — ACETAMINOPHEN 650 MG: 325 TABLET, FILM COATED ORAL at 02:45

## 2020-01-01 RX ADMIN — ALBUTEROL SULFATE 2 PUFF: 90 AEROSOL, METERED RESPIRATORY (INHALATION) at 17:20

## 2020-01-01 RX ADMIN — WARFARIN SODIUM 5 MG: 5 TABLET ORAL at 17:24

## 2020-01-01 RX ADMIN — COLCHICINE 0.6 MG: 0.6 TABLET, FILM COATED ORAL at 04:44

## 2020-01-01 RX ADMIN — INSULIN HUMAN 1 UNITS: 100 INJECTION, SOLUTION PARENTERAL at 19:47

## 2020-01-01 RX ADMIN — Medication 5 MG: at 21:09

## 2020-01-01 RX ADMIN — AMPICILLIN AND SULBACTAM 3 G: 2; 1 INJECTION, POWDER, FOR SOLUTION INTRAVENOUS at 00:01

## 2020-01-01 RX ADMIN — POTASSIUM CHLORIDE 20 MEQ: 1500 TABLET, EXTENDED RELEASE ORAL at 16:01

## 2020-01-01 RX ADMIN — AMPICILLIN AND SULBACTAM 3 G: 2; 1 INJECTION, POWDER, FOR SOLUTION INTRAVENOUS at 04:30

## 2020-01-01 RX ADMIN — TAMSULOSIN HYDROCHLORIDE 0.4 MG: 0.4 CAPSULE ORAL at 17:59

## 2020-01-01 RX ADMIN — OXYCODONE HYDROCHLORIDE 5 MG: 5 TABLET ORAL at 03:15

## 2020-01-01 RX ADMIN — INSULIN HUMAN 1 UNITS: 100 INJECTION, SOLUTION PARENTERAL at 11:00

## 2020-01-01 RX ADMIN — AMPICILLIN SODIUM AND SULBACTAM SODIUM 3 G: 2; 1 INJECTION, POWDER, FOR SOLUTION INTRAMUSCULAR; INTRAVENOUS at 12:32

## 2020-01-01 RX ADMIN — METOPROLOL TARTRATE 50 MG: 50 TABLET, FILM COATED ORAL at 04:03

## 2020-01-01 RX ADMIN — AMPICILLIN AND SULBACTAM 3 G: 2; 1 INJECTION, POWDER, FOR SOLUTION INTRAVENOUS at 00:56

## 2020-01-01 RX ADMIN — BENZONATATE 100 MG: 100 CAPSULE ORAL at 08:23

## 2020-01-01 RX ADMIN — AMPICILLIN AND SULBACTAM 3 G: 2; 1 INJECTION, POWDER, FOR SOLUTION INTRAVENOUS at 16:58

## 2020-01-01 RX ADMIN — ALLOPURINOL 100 MG: 100 TABLET ORAL at 04:28

## 2020-01-01 RX ADMIN — FUROSEMIDE 20 MG: 20 TABLET ORAL at 06:01

## 2020-01-01 RX ADMIN — CEPHALEXIN 500 MG: 500 CAPSULE ORAL at 12:05

## 2020-01-01 RX ADMIN — HYDROXYZINE HYDROCHLORIDE 25 MG: 25 TABLET, FILM COATED ORAL at 01:42

## 2020-01-01 RX ADMIN — REGADENOSON 0.4 MG: 0.08 INJECTION, SOLUTION INTRAVENOUS at 11:33

## 2020-01-01 RX ADMIN — METOPROLOL SUCCINATE 50 MG: 50 TABLET, EXTENDED RELEASE ORAL at 04:33

## 2020-01-01 RX ADMIN — METOPROLOL TARTRATE 50 MG: 50 TABLET, FILM COATED ORAL at 06:01

## 2020-01-01 RX ADMIN — LISINOPRIL 2.5 MG: 2.5 TABLET ORAL at 17:29

## 2020-01-01 RX ADMIN — INSULIN HUMAN 2 UNITS: 100 INJECTION, SOLUTION PARENTERAL at 20:32

## 2020-01-01 RX ADMIN — CEPHALEXIN 500 MG: 500 CAPSULE ORAL at 23:56

## 2020-01-01 RX ADMIN — OXYCODONE HYDROCHLORIDE 10 MG: 5 TABLET ORAL at 02:46

## 2020-01-01 RX ADMIN — ALLOPURINOL 100 MG: 100 TABLET ORAL at 05:01

## 2020-01-01 RX ADMIN — DOCUSATE SODIUM 50 MG AND SENNOSIDES 8.6 MG 2 TABLET: 8.6; 5 TABLET, FILM COATED ORAL at 17:14

## 2020-01-01 RX ADMIN — METOPROLOL TARTRATE 25 MG: 25 TABLET, FILM COATED ORAL at 14:39

## 2020-01-01 RX ADMIN — AMPICILLIN AND SULBACTAM 3 G: 2; 1 INJECTION, POWDER, FOR SOLUTION INTRAVENOUS at 12:03

## 2020-01-01 RX ADMIN — DOXYCYCLINE 100 MG: 100 TABLET, FILM COATED ORAL at 05:36

## 2020-01-01 RX ADMIN — ALBUTEROL SULFATE 2 PUFF: 90 AEROSOL, METERED RESPIRATORY (INHALATION) at 16:22

## 2020-01-01 RX ADMIN — WARFARIN SODIUM 5 MG: 5 TABLET ORAL at 17:23

## 2020-01-01 RX ADMIN — BENZONATATE 100 MG: 100 CAPSULE ORAL at 17:30

## 2020-01-01 RX ADMIN — POTASSIUM CHLORIDE 20 MEQ: 1500 TABLET, EXTENDED RELEASE ORAL at 04:04

## 2020-01-01 RX ADMIN — OXYCODONE HYDROCHLORIDE 5 MG: 5 TABLET ORAL at 16:13

## 2020-01-01 RX ADMIN — Medication 5 MG: at 20:53

## 2020-01-01 RX ADMIN — Medication 5 MG: at 20:09

## 2020-01-01 RX ADMIN — WARFARIN SODIUM 2 MG: 2 TABLET ORAL at 18:25

## 2020-01-01 RX ADMIN — AMPICILLIN SODIUM AND SULBACTAM SODIUM 3 G: 2; 1 INJECTION, POWDER, FOR SOLUTION INTRAMUSCULAR; INTRAVENOUS at 06:01

## 2020-01-01 RX ADMIN — CEPHALEXIN 500 MG: 500 CAPSULE ORAL at 17:05

## 2020-01-01 RX ADMIN — SODIUM CHLORIDE 1500 ML: 9 INJECTION, SOLUTION INTRAVENOUS at 10:48

## 2020-01-01 RX ADMIN — METOPROLOL TARTRATE 50 MG: 50 TABLET, FILM COATED ORAL at 17:08

## 2020-01-01 RX ADMIN — SODIUM CHLORIDE 250 ML: 9 INJECTION, SOLUTION INTRAVENOUS at 00:56

## 2020-01-01 RX ADMIN — DOCUSATE SODIUM 50 MG AND SENNOSIDES 8.6 MG 2 TABLET: 8.6; 5 TABLET, FILM COATED ORAL at 16:55

## 2020-01-01 RX ADMIN — LINEZOLID 600 MG: 600 TABLET, FILM COATED ORAL at 04:28

## 2020-01-01 RX ADMIN — AMPICILLIN SODIUM AND SULBACTAM SODIUM 3 G: 2; 1 INJECTION, POWDER, FOR SOLUTION INTRAMUSCULAR; INTRAVENOUS at 17:18

## 2020-01-01 RX ADMIN — ALLOPURINOL 100 MG: 100 TABLET ORAL at 04:44

## 2020-01-01 RX ADMIN — OXYCODONE HYDROCHLORIDE 10 MG: 5 TABLET ORAL at 21:26

## 2020-01-01 RX ADMIN — FUROSEMIDE 20 MG: 20 TABLET ORAL at 04:28

## 2020-01-01 RX ADMIN — AMPICILLIN AND SULBACTAM 3 G: 2; 1 INJECTION, POWDER, FOR SOLUTION INTRAVENOUS at 05:11

## 2020-01-01 RX ADMIN — DOCUSATE SODIUM 50 MG AND SENNOSIDES 8.6 MG 2 TABLET: 8.6; 5 TABLET, FILM COATED ORAL at 05:11

## 2020-01-01 RX ADMIN — ACETAMINOPHEN 650 MG: 325 TABLET, FILM COATED ORAL at 12:27

## 2020-01-01 RX ADMIN — INSULIN HUMAN 1 UNITS: 100 INJECTION, SOLUTION PARENTERAL at 12:10

## 2020-01-01 RX ADMIN — WARFARIN SODIUM 3 MG: 3 TABLET ORAL at 17:08

## 2020-01-01 RX ADMIN — ALLOPURINOL 100 MG: 100 TABLET ORAL at 06:01

## 2020-01-01 RX ADMIN — ALLOPURINOL 100 MG: 100 TABLET ORAL at 04:53

## 2020-01-01 RX ADMIN — TAMSULOSIN HYDROCHLORIDE 0.4 MG: 0.4 CAPSULE ORAL at 16:29

## 2020-01-01 RX ADMIN — POTASSIUM CHLORIDE 20 MEQ: 1500 TABLET, EXTENDED RELEASE ORAL at 06:01

## 2020-01-01 RX ADMIN — OXYCODONE HYDROCHLORIDE 5 MG: 5 TABLET ORAL at 12:44

## 2020-01-01 RX ADMIN — LINEZOLID 600 MG: 600 TABLET, FILM COATED ORAL at 05:01

## 2020-01-01 RX ADMIN — OXYCODONE HYDROCHLORIDE 10 MG: 5 TABLET ORAL at 09:34

## 2020-01-01 RX ADMIN — ACETAMINOPHEN 650 MG: 325 TABLET, FILM COATED ORAL at 00:01

## 2020-01-01 RX ADMIN — DOCUSATE SODIUM 50 MG AND SENNOSIDES 8.6 MG 2 TABLET: 8.6; 5 TABLET, FILM COATED ORAL at 16:36

## 2020-01-01 RX ADMIN — AMPICILLIN SODIUM AND SULBACTAM SODIUM 3 G: 2; 1 INJECTION, POWDER, FOR SOLUTION INTRAMUSCULAR; INTRAVENOUS at 05:29

## 2020-01-01 RX ADMIN — COLCHICINE 0.6 MG: 0.6 TABLET, FILM COATED ORAL at 06:01

## 2020-01-01 RX ADMIN — DIPHENHYDRAMINE HYDROCHLORIDE 25 MG: 25 TABLET ORAL at 20:27

## 2020-01-01 RX ADMIN — METOPROLOL TARTRATE 2.5 MG: 5 INJECTION, SOLUTION INTRAVENOUS at 00:56

## 2020-01-01 RX ADMIN — METOPROLOL TARTRATE 50 MG: 50 TABLET, FILM COATED ORAL at 05:59

## 2020-01-01 RX ADMIN — INSULIN HUMAN 1 UNITS: 100 INJECTION, SOLUTION PARENTERAL at 12:37

## 2020-01-01 RX ADMIN — ALBUTEROL SULFATE 2 PUFF: 90 AEROSOL, METERED RESPIRATORY (INHALATION) at 10:38

## 2020-01-01 RX ADMIN — POTASSIUM CHLORIDE 40 MEQ: 1500 TABLET, EXTENDED RELEASE ORAL at 09:46

## 2020-01-01 RX ADMIN — METFORMIN HYDROCHLORIDE 500 MG: 500 TABLET ORAL at 08:12

## 2020-01-01 RX ADMIN — OXYCODONE HYDROCHLORIDE 10 MG: 5 TABLET ORAL at 21:07

## 2020-01-01 RX ADMIN — TAMSULOSIN HYDROCHLORIDE 0.4 MG: 0.4 CAPSULE ORAL at 14:18

## 2020-01-01 RX ADMIN — CEPHALEXIN 500 MG: 500 CAPSULE ORAL at 12:25

## 2020-01-01 RX ADMIN — OXYCODONE HYDROCHLORIDE 5 MG: 5 TABLET ORAL at 20:27

## 2020-01-01 RX ADMIN — Medication 5 MG: at 23:31

## 2020-01-01 RX ADMIN — DOCUSATE SODIUM 50 MG AND SENNOSIDES 8.6 MG 2 TABLET: 8.6; 5 TABLET, FILM COATED ORAL at 05:41

## 2020-01-01 RX ADMIN — METOPROLOL TARTRATE 5 MG: 1 INJECTION, SOLUTION INTRAVENOUS at 02:06

## 2020-01-01 RX ADMIN — AMPICILLIN SODIUM AND SULBACTAM SODIUM 3 G: 2; 1 INJECTION, POWDER, FOR SOLUTION INTRAMUSCULAR; INTRAVENOUS at 22:31

## 2020-01-01 RX ADMIN — DIPHENHYDRAMINE HYDROCHLORIDE 25 MG: 25 TABLET ORAL at 09:57

## 2020-01-01 RX ADMIN — ACETAMINOPHEN 650 MG: 325 TABLET, FILM COATED ORAL at 09:54

## 2020-01-01 RX ADMIN — AMPICILLIN AND SULBACTAM 3 G: 2; 1 INJECTION, POWDER, FOR SOLUTION INTRAVENOUS at 16:55

## 2020-01-01 RX ADMIN — TAMSULOSIN HYDROCHLORIDE 0.4 MG: 0.4 CAPSULE ORAL at 14:02

## 2020-01-01 RX ADMIN — LINEZOLID 600 MG: 600 TABLET, FILM COATED ORAL at 06:01

## 2020-01-01 RX ADMIN — METOPROLOL TARTRATE 2.5 MG: 1 INJECTION, SOLUTION INTRAVENOUS at 21:52

## 2020-01-01 RX ADMIN — HYDROXYZINE HYDROCHLORIDE 25 MG: 25 TABLET, FILM COATED ORAL at 06:05

## 2020-01-01 RX ADMIN — INSULIN HUMAN 1 UNITS: 100 INJECTION, SOLUTION PARENTERAL at 08:49

## 2020-01-01 RX ADMIN — LINEZOLID 600 MG: 600 TABLET, FILM COATED ORAL at 17:14

## 2020-01-01 RX ADMIN — DIPHENHYDRAMINE HYDROCHLORIDE 25 MG: 25 TABLET ORAL at 21:27

## 2020-01-01 RX ADMIN — OXYCODONE HYDROCHLORIDE 10 MG: 5 TABLET ORAL at 05:06

## 2020-01-01 RX ADMIN — VANCOMYCIN HYDROCHLORIDE 1500 MG: 500 INJECTION, POWDER, LYOPHILIZED, FOR SOLUTION INTRAVENOUS at 18:23

## 2020-01-01 RX ADMIN — OXYCODONE HYDROCHLORIDE 5 MG: 5 TABLET ORAL at 05:41

## 2020-01-01 RX ADMIN — DEXTROMETHORPHAN HBR, GUAIFENESIN 10 ML: 20; 200 SOLUTION ORAL at 21:27

## 2020-01-01 RX ADMIN — POTASSIUM CHLORIDE 20 MEQ: 1500 TABLET, EXTENDED RELEASE ORAL at 04:28

## 2020-01-01 RX ADMIN — WARFARIN SODIUM 5 MG: 5 TABLET ORAL at 17:29

## 2020-01-01 RX ADMIN — ACETAMINOPHEN 650 MG: 325 TABLET, FILM COATED ORAL at 08:22

## 2020-01-01 RX ADMIN — AMPICILLIN AND SULBACTAM 3 G: 2; 1 INJECTION, POWDER, FOR SOLUTION INTRAVENOUS at 09:47

## 2020-01-01 RX ADMIN — LINEZOLID 600 MG: 600 TABLET, FILM COATED ORAL at 17:25

## 2020-01-01 RX ADMIN — COLCHICINE 0.6 MG: 0.6 TABLET, FILM COATED ORAL at 16:37

## 2020-01-01 RX ADMIN — OXYCODONE HYDROCHLORIDE 5 MG: 5 TABLET ORAL at 21:02

## 2020-01-01 RX ADMIN — BENZONATATE 100 MG: 100 CAPSULE ORAL at 23:55

## 2020-01-01 RX ADMIN — COLCHICINE 0.6 MG: 0.6 TABLET, FILM COATED ORAL at 17:23

## 2020-01-01 RX ADMIN — HYDROXYZINE HYDROCHLORIDE 25 MG: 25 TABLET, FILM COATED ORAL at 03:49

## 2020-01-01 RX ADMIN — AMPICILLIN AND SULBACTAM 3 G: 2; 1 INJECTION, POWDER, FOR SOLUTION INTRAVENOUS at 12:25

## 2020-01-01 RX ADMIN — OXYCODONE HYDROCHLORIDE 10 MG: 5 TABLET ORAL at 17:24

## 2020-01-01 RX ADMIN — OXYCODONE HYDROCHLORIDE 10 MG: 5 TABLET ORAL at 12:20

## 2020-01-01 RX ADMIN — FUROSEMIDE 20 MG: 20 TABLET ORAL at 16:01

## 2020-01-01 RX ADMIN — OXYCODONE HYDROCHLORIDE 5 MG: 5 TABLET ORAL at 04:33

## 2020-01-01 RX ADMIN — WARFARIN SODIUM 2 MG: 2 TABLET ORAL at 17:59

## 2020-01-01 RX ADMIN — AMPICILLIN SODIUM AND SULBACTAM SODIUM 3 G: 2; 1 INJECTION, POWDER, FOR SOLUTION INTRAMUSCULAR; INTRAVENOUS at 12:01

## 2020-01-01 RX ADMIN — OXYCODONE HYDROCHLORIDE 10 MG: 5 TABLET ORAL at 08:17

## 2020-01-01 RX ADMIN — AMPICILLIN AND SULBACTAM 3 G: 2; 1 INJECTION, POWDER, FOR SOLUTION INTRAVENOUS at 01:42

## 2020-01-01 RX ADMIN — POTASSIUM CHLORIDE 20 MEQ: 1500 TABLET, EXTENDED RELEASE ORAL at 05:01

## 2020-01-01 RX ADMIN — HYDROXYZINE HYDROCHLORIDE 25 MG: 25 TABLET, FILM COATED ORAL at 08:18

## 2020-01-01 RX ADMIN — CEPHALEXIN 500 MG: 500 CAPSULE ORAL at 04:04

## 2020-01-01 RX ADMIN — ALBUTEROL SULFATE 2 PUFF: 90 AEROSOL, METERED RESPIRATORY (INHALATION) at 18:00

## 2020-01-01 RX ADMIN — INSULIN HUMAN 1 UNITS: 100 INJECTION, SOLUTION PARENTERAL at 16:51

## 2020-01-01 RX ADMIN — HYDROXYZINE HYDROCHLORIDE 25 MG: 25 TABLET, FILM COATED ORAL at 08:17

## 2020-01-01 RX ADMIN — COLCHICINE 0.6 MG: 0.6 TABLET, FILM COATED ORAL at 05:01

## 2020-01-01 RX ADMIN — OXYCODONE HYDROCHLORIDE 10 MG: 5 TABLET ORAL at 16:32

## 2020-01-01 RX ADMIN — CETIRIZINE HYDROCHLORIDE 10 MG: 10 TABLET, FILM COATED ORAL at 04:53

## 2020-01-01 RX ADMIN — METOPROLOL TARTRATE 50 MG: 50 TABLET, FILM COATED ORAL at 17:24

## 2020-01-01 RX ADMIN — AMPICILLIN SODIUM AND SULBACTAM SODIUM 3 G: 2; 1 INJECTION, POWDER, FOR SOLUTION INTRAMUSCULAR; INTRAVENOUS at 04:28

## 2020-01-01 RX ADMIN — COLCHICINE 0.6 MG: 0.6 TABLET, FILM COATED ORAL at 17:26

## 2020-01-01 RX ADMIN — BENZONATATE 100 MG: 100 CAPSULE ORAL at 09:41

## 2020-01-01 RX ADMIN — OXYCODONE HYDROCHLORIDE 10 MG: 5 TABLET ORAL at 13:10

## 2020-01-01 RX ADMIN — BENZONATATE 100 MG: 100 CAPSULE ORAL at 20:52

## 2020-01-01 RX ADMIN — OXYCODONE HYDROCHLORIDE 10 MG: 5 TABLET ORAL at 21:27

## 2020-01-01 RX ADMIN — METOPROLOL TARTRATE 25 MG: 25 TABLET, FILM COATED ORAL at 17:06

## 2020-01-01 RX ADMIN — OXYCODONE HYDROCHLORIDE 10 MG: 5 TABLET ORAL at 00:01

## 2020-01-01 RX ADMIN — BENZONATATE 100 MG: 100 CAPSULE ORAL at 12:27

## 2020-01-01 RX ADMIN — CEPHALEXIN 500 MG: 500 CAPSULE ORAL at 22:30

## 2020-01-01 RX ADMIN — DOCUSATE SODIUM 50 MG AND SENNOSIDES 8.6 MG 2 TABLET: 8.6; 5 TABLET, FILM COATED ORAL at 16:59

## 2020-01-01 RX ADMIN — Medication 5 MG: at 20:00

## 2020-01-01 RX ADMIN — METOPROLOL TARTRATE 50 MG: 50 TABLET, FILM COATED ORAL at 16:36

## 2020-01-01 RX ADMIN — METOPROLOL TARTRATE 50 MG: 50 TABLET, FILM COATED ORAL at 05:36

## 2020-01-01 RX ADMIN — COLCHICINE 0.6 MG: 0.6 TABLET, FILM COATED ORAL at 18:12

## 2020-01-01 RX ADMIN — OXYCODONE HYDROCHLORIDE 10 MG: 5 TABLET ORAL at 00:56

## 2020-01-01 RX ADMIN — DOXYCYCLINE 100 MG: 100 TABLET, FILM COATED ORAL at 17:07

## 2020-01-01 RX ADMIN — VANCOMYCIN HYDROCHLORIDE 3 G: 500 INJECTION, POWDER, LYOPHILIZED, FOR SOLUTION INTRAVENOUS at 16:27

## 2020-01-01 RX ADMIN — OXYCODONE HYDROCHLORIDE 10 MG: 5 TABLET ORAL at 05:21

## 2020-01-01 RX ADMIN — HYDROXYZINE HYDROCHLORIDE 25 MG: 25 TABLET, FILM COATED ORAL at 17:28

## 2020-01-01 RX ADMIN — DIPHENHYDRAMINE HYDROCHLORIDE 25 MG: 25 TABLET ORAL at 04:03

## 2020-01-01 RX ADMIN — DEXTROMETHORPHAN HBR, GUAIFENESIN 10 ML: 20; 200 SOLUTION ORAL at 23:46

## 2020-01-01 RX ADMIN — METOPROLOL TARTRATE 25 MG: 25 TABLET, FILM COATED ORAL at 01:35

## 2020-01-01 RX ADMIN — METOPROLOL SUCCINATE 50 MG: 50 TABLET, EXTENDED RELEASE ORAL at 05:41

## 2020-01-01 RX ADMIN — METOPROLOL TARTRATE 2.5 MG: 5 INJECTION, SOLUTION INTRAVENOUS at 21:52

## 2020-01-01 RX ADMIN — FUROSEMIDE 20 MG: 20 TABLET ORAL at 04:03

## 2020-01-01 RX ADMIN — SODIUM CHLORIDE 3 G: 900 INJECTION INTRAVENOUS at 17:56

## 2020-01-01 RX ADMIN — OXYCODONE HYDROCHLORIDE 10 MG: 5 TABLET ORAL at 08:12

## 2020-01-01 RX ADMIN — INSULIN HUMAN 1 UNITS: 100 INJECTION, SOLUTION PARENTERAL at 12:31

## 2020-01-01 RX ADMIN — DEXTROMETHORPHAN HBR, GUAIFENESIN 10 ML: 20; 200 SOLUTION ORAL at 14:48

## 2020-01-01 RX ADMIN — SODIUM CHLORIDE 3 G: 900 INJECTION INTRAVENOUS at 15:21

## 2020-01-01 RX ADMIN — SODIUM CHLORIDE, POTASSIUM CHLORIDE, SODIUM LACTATE AND CALCIUM CHLORIDE 1000 ML: 600; 310; 30; 20 INJECTION, SOLUTION INTRAVENOUS at 13:22

## 2020-01-01 RX ADMIN — OXYCODONE HYDROCHLORIDE 10 MG: 5 TABLET ORAL at 20:09

## 2020-01-01 RX ADMIN — DOCUSATE SODIUM 50 MG AND SENNOSIDES 8.6 MG 2 TABLET: 8.6; 5 TABLET, FILM COATED ORAL at 04:44

## 2020-01-01 RX ADMIN — HYDROXYZINE HYDROCHLORIDE 25 MG: 25 TABLET, FILM COATED ORAL at 02:22

## 2020-01-01 RX ADMIN — ACETAMINOPHEN 650 MG: 325 TABLET, FILM COATED ORAL at 23:02

## 2020-01-01 RX ADMIN — OXYCODONE HYDROCHLORIDE 10 MG: 5 TABLET ORAL at 23:25

## 2020-01-01 RX ADMIN — BENZONATATE 100 MG: 100 CAPSULE ORAL at 16:33

## 2020-01-01 RX ADMIN — OXYCODONE HYDROCHLORIDE 10 MG: 5 TABLET ORAL at 04:28

## 2020-01-01 RX ADMIN — VANCOMYCIN HYDROCHLORIDE 3000 MG: 500 INJECTION, POWDER, LYOPHILIZED, FOR SOLUTION INTRAVENOUS at 05:11

## 2020-01-01 RX ADMIN — FUROSEMIDE 20 MG: 20 TABLET ORAL at 05:01

## 2020-01-01 RX ADMIN — WARFARIN SODIUM 5 MG: 5 TABLET ORAL at 16:54

## 2020-01-01 RX ADMIN — CETIRIZINE HYDROCHLORIDE 10 MG: 10 TABLET, FILM COATED ORAL at 05:41

## 2020-01-01 RX ADMIN — OXYCODONE HYDROCHLORIDE 10 MG: 5 TABLET ORAL at 20:04

## 2020-01-01 RX ADMIN — HYDROXYZINE HYDROCHLORIDE 25 MG: 25 TABLET, FILM COATED ORAL at 16:00

## 2020-01-01 RX ADMIN — HYDROXYZINE HYDROCHLORIDE 25 MG: 25 TABLET, FILM COATED ORAL at 09:33

## 2020-01-01 RX ADMIN — METOPROLOL TARTRATE 50 MG: 50 TABLET, FILM COATED ORAL at 17:48

## 2020-01-01 RX ADMIN — ACETAMINOPHEN 650 MG: 325 TABLET, FILM COATED ORAL at 14:24

## 2020-01-01 RX ADMIN — ALLOPURINOL 100 MG: 100 TABLET ORAL at 04:03

## 2020-01-01 RX ADMIN — OXYCODONE HYDROCHLORIDE 10 MG: 5 TABLET ORAL at 12:11

## 2020-01-01 RX ADMIN — AMPICILLIN SODIUM AND SULBACTAM SODIUM 3 G: 2; 1 INJECTION, POWDER, FOR SOLUTION INTRAMUSCULAR; INTRAVENOUS at 05:03

## 2020-01-01 RX ADMIN — AMPICILLIN SODIUM AND SULBACTAM SODIUM 3 G: 2; 1 INJECTION, POWDER, FOR SOLUTION INTRAMUSCULAR; INTRAVENOUS at 22:20

## 2020-01-01 RX ADMIN — HYDROXYZINE HYDROCHLORIDE 25 MG: 25 TABLET, FILM COATED ORAL at 00:08

## 2020-01-01 RX ADMIN — ALBUTEROL SULFATE 2 PUFF: 90 AEROSOL, METERED RESPIRATORY (INHALATION) at 06:02

## 2020-01-01 RX ADMIN — OXYCODONE HYDROCHLORIDE 10 MG: 5 TABLET ORAL at 00:06

## 2020-01-01 RX ADMIN — TAMSULOSIN HYDROCHLORIDE 0.4 MG: 0.4 CAPSULE ORAL at 17:29

## 2020-01-01 RX ADMIN — TAMSULOSIN HYDROCHLORIDE 0.4 MG: 0.4 CAPSULE ORAL at 19:33

## 2020-01-01 RX ADMIN — METOPROLOL TARTRATE 50 MG: 50 TABLET, FILM COATED ORAL at 17:26

## 2020-01-01 RX ADMIN — OXYCODONE HYDROCHLORIDE 10 MG: 5 TABLET ORAL at 08:25

## 2020-01-01 RX ADMIN — DIPHENHYDRAMINE HYDROCHLORIDE 25 MG: 25 TABLET ORAL at 20:19

## 2020-01-01 RX ADMIN — AMPICILLIN SODIUM AND SULBACTAM SODIUM 3 G: 2; 1 INJECTION, POWDER, FOR SOLUTION INTRAMUSCULAR; INTRAVENOUS at 14:01

## 2020-01-01 RX ADMIN — WARFARIN SODIUM 7.5 MG: 7.5 TABLET ORAL at 20:04

## 2020-01-01 RX ADMIN — MORPHINE SULFATE 1 MG: 4 INJECTION INTRAVENOUS at 20:25

## 2020-01-01 RX ADMIN — OXYCODONE HYDROCHLORIDE 10 MG: 5 TABLET ORAL at 12:07

## 2020-01-01 RX ADMIN — DOXYCYCLINE 100 MG: 100 TABLET, FILM COATED ORAL at 04:03

## 2020-01-01 RX ADMIN — OXYCODONE HYDROCHLORIDE 5 MG: 5 TABLET ORAL at 19:59

## 2020-01-01 RX ADMIN — OXYCODONE HYDROCHLORIDE 10 MG: 5 TABLET ORAL at 14:16

## 2020-01-01 RX ADMIN — ACETAMINOPHEN 650 MG: 325 TABLET, FILM COATED ORAL at 20:47

## 2020-01-01 RX ADMIN — WARFARIN SODIUM 5 MG: 5 TABLET ORAL at 19:43

## 2020-01-01 RX ADMIN — OXYCODONE HYDROCHLORIDE 5 MG: 5 TABLET ORAL at 17:23

## 2020-01-01 RX ADMIN — OXYCODONE HYDROCHLORIDE 10 MG: 5 TABLET ORAL at 04:44

## 2020-01-01 RX ADMIN — METOPROLOL TARTRATE 50 MG: 50 TABLET, FILM COATED ORAL at 16:54

## 2020-01-01 RX ADMIN — ALLOPURINOL 100 MG: 100 TABLET ORAL at 05:41

## 2020-01-01 RX ADMIN — OXYCODONE HYDROCHLORIDE 10 MG: 5 TABLET ORAL at 05:59

## 2020-01-01 RX ADMIN — ACETAMINOPHEN 650 MG: 325 TABLET, FILM COATED ORAL at 16:46

## 2020-01-01 RX ADMIN — LISINOPRIL 2.5 MG: 2.5 TABLET ORAL at 17:23

## 2020-01-01 RX ADMIN — TAMSULOSIN HYDROCHLORIDE 0.4 MG: 0.4 CAPSULE ORAL at 14:07

## 2020-01-01 RX ADMIN — OXYCODONE HYDROCHLORIDE 5 MG: 5 TABLET ORAL at 04:53

## 2020-01-01 RX ADMIN — METOPROLOL TARTRATE 50 MG: 50 TABLET, FILM COATED ORAL at 17:14

## 2020-01-01 RX ADMIN — OXYCODONE HYDROCHLORIDE 10 MG: 5 TABLET ORAL at 17:23

## 2020-01-01 RX ADMIN — METOPROLOL TARTRATE 50 MG: 50 TABLET, FILM COATED ORAL at 05:04

## 2020-01-01 SDOH — ECONOMIC STABILITY: FOOD INSECURITY: WITHIN THE PAST 12 MONTHS, THE FOOD YOU BOUGHT JUST DIDN'T LAST AND YOU DIDN'T HAVE MONEY TO GET MORE.: NEVER TRUE

## 2020-01-01 SDOH — ECONOMIC STABILITY: FOOD INSECURITY: WITHIN THE PAST 12 MONTHS, YOU WORRIED THAT YOUR FOOD WOULD RUN OUT BEFORE YOU GOT MONEY TO BUY MORE.: NEVER TRUE

## 2020-01-01 SDOH — ECONOMIC STABILITY: INCOME INSECURITY: HOW HARD IS IT FOR YOU TO PAY FOR THE VERY BASICS LIKE FOOD, HOUSING, MEDICAL CARE, AND HEATING?: NOT VERY HARD

## 2020-01-01 SDOH — ECONOMIC STABILITY: TRANSPORTATION INSECURITY
IN THE PAST 12 MONTHS, HAS LACK OF TRANSPORTATION KEPT YOU FROM MEETINGS, WORK, OR FROM GETTING THINGS NEEDED FOR DAILY LIVING?: YES

## 2020-01-01 SDOH — ECONOMIC STABILITY: TRANSPORTATION INSECURITY
IN THE PAST 12 MONTHS, HAS THE LACK OF TRANSPORTATION KEPT YOU FROM MEDICAL APPOINTMENTS OR FROM GETTING MEDICATIONS?: YES

## 2020-01-01 ASSESSMENT — ENCOUNTER SYMPTOMS
DIZZINESS: 0
DEPRESSION: 0
CHILLS: 0
DEPRESSION: 0
MYALGIAS: 1
NAUSEA: 0
NAUSEA: 0
BACK PAIN: 0
VOMITING: 0
MYALGIAS: 0
EYE DISCHARGE: 0
NERVOUS/ANXIOUS: 0
BACK PAIN: 0
ABDOMINAL PAIN: 0
VOMITING: 0
FEVER: 0
BRUISES/BLEEDS EASILY: 0
SORE THROAT: 0
SORE THROAT: 0
PSYCHIATRIC NEGATIVE: 1
FOCAL WEAKNESS: 0
NAUSEA: 0
ABDOMINAL PAIN: 0
VOMITING: 0
SENSORY CHANGE: 0
BACK PAIN: 1
HEADACHES: 0
EYE DISCHARGE: 0
COUGH: 1
SHORTNESS OF BREATH: 0
DEPRESSION: 0
DIZZINESS: 0
HEADACHES: 0
CHILLS: 0
CHILLS: 0
LOSS OF CONSCIOUSNESS: 0
NECK PAIN: 0
BACK PAIN: 0
SPUTUM PRODUCTION: 0
NAUSEA: 0
EYE DISCHARGE: 0
TREMORS: 0
WHEEZING: 0
BRUISES/BLEEDS EASILY: 0
FEVER: 0
SPUTUM PRODUCTION: 0
EYE PAIN: 0
EYE PAIN: 0
CONSTIPATION: 0
FEVER: 0
SORE THROAT: 0
CHILLS: 0
NEUROLOGICAL NEGATIVE: 1
EYES NEGATIVE: 1
NAUSEA: 0
DIAPHORESIS: 0
VOMITING: 0
CHILLS: 0
WEAKNESS: 0
SHORTNESS OF BREATH: 1
BRUISES/BLEEDS EASILY: 0
SHORTNESS OF BREATH: 1
PALPITATIONS: 0
EYE PAIN: 0
WHEEZING: 1
HEMOPTYSIS: 0
HEMOPTYSIS: 0
CHILLS: 0
CLAUDICATION: 0
ABDOMINAL PAIN: 0
DIARRHEA: 0
SHORTNESS OF BREATH: 0
WHEEZING: 0
NAUSEA: 0
NECK PAIN: 0
CHILLS: 0
CONSTIPATION: 0
DEPRESSION: 0
SENSORY CHANGE: 0
SHORTNESS OF BREATH: 1
NECK PAIN: 0
FEVER: 0
VOMITING: 0
COUGH: 1
COUGH: 0
BRUISES/BLEEDS EASILY: 0
EYE DISCHARGE: 0
DEPRESSION: 0
DIAPHORESIS: 0
VOMITING: 0
ABDOMINAL PAIN: 0
WEAKNESS: 0
SPUTUM PRODUCTION: 0
EYE DISCHARGE: 0
HEADACHES: 0
PND: 0
SPUTUM PRODUCTION: 0
DIZZINESS: 0
DIZZINESS: 0
VOMITING: 0
HEADACHES: 0
CHILLS: 0
MYALGIAS: 0
CONSTIPATION: 0
ABDOMINAL PAIN: 0
DIARRHEA: 0
SPEECH CHANGE: 0
SORE THROAT: 0
SENSORY CHANGE: 0
COUGH: 1
DIZZINESS: 0
SENSORY CHANGE: 0
ABDOMINAL PAIN: 0
DIAPHORESIS: 0
HEMOPTYSIS: 0
PALPITATIONS: 0
DIAPHORESIS: 0
MYALGIAS: 0
SHORTNESS OF BREATH: 0
EYE DISCHARGE: 0
CHILLS: 0
SHORTNESS OF BREATH: 0
RESPIRATORY NEGATIVE: 1
COUGH: 1
WHEEZING: 0
FEVER: 0
PALPITATIONS: 0
HEADACHES: 0
VOMITING: 0
NAUSEA: 0
CLAUDICATION: 0
WEAKNESS: 0
SPUTUM PRODUCTION: 0
CONSTIPATION: 0
HEARTBURN: 0
NECK PAIN: 0
BACK PAIN: 0
DIZZINESS: 0
DIARRHEA: 0
FALLS: 0
MYALGIAS: 0
PALPITATIONS: 0
CONSTIPATION: 0
BRUISES/BLEEDS EASILY: 0
DEPRESSION: 0
COUGH: 0
GASTROINTESTINAL NEGATIVE: 1
VOMITING: 0
FEVER: 0
EYE PAIN: 0
CHILLS: 0
ABDOMINAL PAIN: 0
ORTHOPNEA: 0
ABDOMINAL PAIN: 0
SORE THROAT: 0
DIARRHEA: 0
DIZZINESS: 1
ABDOMINAL PAIN: 0
SINUS PAIN: 0
FEVER: 0
LOSS OF CONSCIOUSNESS: 0
FEVER: 0
COUGH: 0
DIAPHORESIS: 0
EYE REDNESS: 0
DEPRESSION: 0
CONSTITUTIONAL NEGATIVE: 1
NAUSEA: 0
FOCAL WEAKNESS: 0
CLAUDICATION: 0
SENSORY CHANGE: 0
PALPITATIONS: 0
WHEEZING: 0
DEPRESSION: 0
BACK PAIN: 0
TREMORS: 0
ABDOMINAL PAIN: 0
SPEECH CHANGE: 0
DIARRHEA: 0
SPEECH CHANGE: 0
HEADACHES: 0
WEAKNESS: 0
DIAPHORESIS: 0
VOMITING: 0
FOCAL WEAKNESS: 0
LOSS OF CONSCIOUSNESS: 0
SPUTUM PRODUCTION: 0
PALPITATIONS: 0
BACK PAIN: 1
WEAKNESS: 0
NAUSEA: 0
SORE THROAT: 0
PALPITATIONS: 0
FEVER: 0
MYALGIAS: 0
COUGH: 1
NAUSEA: 0
PALPITATIONS: 0
CLAUDICATION: 0
MYALGIAS: 0
PALPITATIONS: 0
EYE PAIN: 0
FEVER: 0
WEAKNESS: 0
SHORTNESS OF BREATH: 0

## 2020-01-01 ASSESSMENT — COGNITIVE AND FUNCTIONAL STATUS - GENERAL
CLIMB 3 TO 5 STEPS WITH RAILING: A LITTLE
DAILY ACTIVITIY SCORE: 24
SUGGESTED CMS G CODE MODIFIER MOBILITY: CI
MOBILITY SCORE: 23
SUGGESTED CMS G CODE MODIFIER MOBILITY: CI
MOBILITY SCORE: 23
CLIMB 3 TO 5 STEPS WITH RAILING: A LITTLE
DAILY ACTIVITIY SCORE: 24
SUGGESTED CMS G CODE MODIFIER DAILY ACTIVITY: CH
SUGGESTED CMS G CODE MODIFIER DAILY ACTIVITY: CH

## 2020-01-01 ASSESSMENT — PAIN DESCRIPTION - PAIN TYPE
TYPE: ACUTE PAIN
TYPE: CHRONIC PAIN
TYPE: ACUTE PAIN
TYPE: CHRONIC PAIN
TYPE: ACUTE PAIN
TYPE: CHRONIC PAIN
TYPE: ACUTE PAIN
TYPE: CHRONIC PAIN
TYPE: ACUTE PAIN
TYPE: CHRONIC PAIN

## 2020-01-01 ASSESSMENT — PAIN SCALES - WONG BAKER
WONGBAKER_NUMERICALRESPONSE: HURTS EVEN MORE
WONGBAKER_NUMERICALRESPONSE: HURTS EVEN MORE
WONGBAKER_NUMERICALRESPONSE: HURTS A LITTLE MORE

## 2020-01-01 ASSESSMENT — FIBROSIS 4 INDEX
FIB4 SCORE: 2.03
FIB4 SCORE: 2.211083193570266566
FIB4 SCORE: 2.211083193570266566
FIB4 SCORE: 2.02
FIB4 SCORE: 2.3
FIB4 SCORE: 2.211083193570266566
FIB4 SCORE: 2.83
FIB4 SCORE: 2.03
FIB4 SCORE: 2.211083193570266566

## 2020-01-01 ASSESSMENT — CHA2DS2 SCORE
HYPERTENSION: YES
AGE 65 TO 74: YES
SEX: MALE
VASCULAR DISEASE: YES
DIABETES: YES
PRIOR STROKE OR TIA OR THROMBOEMBOLISM: YES
VASCULAR DISEASE: NO
HYPERTENSION: YES
PRIOR STROKE OR TIA OR THROMBOEMBOLISM: NO
CHA2DS2 VASC SCORE: 3
AGE 65 TO 74: YES
SEX: MALE
DIABETES: YES
CHF OR LEFT VENTRICULAR DYSFUNCTION: NO
AGE 75 OR GREATER: NO
AGE 75 OR GREATER: NO
CHA2DS2 VASC SCORE: 6
CHF OR LEFT VENTRICULAR DYSFUNCTION: NO

## 2020-01-01 ASSESSMENT — PATIENT HEALTH QUESTIONNAIRE - PHQ9
1. LITTLE INTEREST OR PLEASURE IN DOING THINGS: NOT AT ALL
1. LITTLE INTEREST OR PLEASURE IN DOING THINGS: NOT AT ALL
2. FEELING DOWN, DEPRESSED, IRRITABLE, OR HOPELESS: NOT AT ALL
2. FEELING DOWN, DEPRESSED, IRRITABLE, OR HOPELESS: NOT AT ALL
SUM OF ALL RESPONSES TO PHQ9 QUESTIONS 1 AND 2: 0
SUM OF ALL RESPONSES TO PHQ9 QUESTIONS 1 AND 2: 0

## 2020-01-01 ASSESSMENT — GAIT ASSESSMENTS
ASSISTIVE DEVICE: SINGLE POINT CANE
DISTANCE (FEET): 150
DEVIATION: INCREASED BASE OF SUPPORT
GAIT LEVEL OF ASSIST: SUPERVISED

## 2020-01-01 ASSESSMENT — LIFESTYLE VARIABLES
ALCOHOL_USE: NO
TOTAL SCORE: 0
EVER FELT BAD OR GUILTY ABOUT YOUR DRINKING: NO
EVER_SMOKED: YES
HOW MANY TIMES IN THE PAST YEAR HAVE YOU HAD 5 OR MORE DRINKS IN A DAY: 0
SUBSTANCE_ABUSE: 0
CONSUMPTION TOTAL: NEGATIVE
SUBSTANCE_ABUSE: 0
SUBSTANCE_ABUSE: 0
HAVE PEOPLE ANNOYED YOU BY CRITICIZING YOUR DRINKING: NO
AVERAGE NUMBER OF DAYS PER WEEK YOU HAVE A DRINK CONTAINING ALCOHOL: 0
DOES PATIENT WANT TO STOP DRINKING: NO
SUBSTANCE_ABUSE: 0
SUBSTANCE_ABUSE: 0
TOTAL SCORE: 0
TOTAL SCORE: 0
ON A TYPICAL DAY WHEN YOU DRINK ALCOHOL HOW MANY DRINKS DO YOU HAVE: 0
SUBSTANCE_ABUSE: 0
HAVE YOU EVER FELT YOU SHOULD CUT DOWN ON YOUR DRINKING: NO
EVER HAD A DRINK FIRST THING IN THE MORNING TO STEADY YOUR NERVES TO GET RID OF A HANGOVER: NO

## 2020-01-01 ASSESSMENT — COPD QUESTIONNAIRES
DURING THE PAST 4 WEEKS HOW MUCH DID YOU FEEL SHORT OF BREATH: SOME OF THE TIME
COPD SCREENING SCORE: 7
HAVE YOU SMOKED AT LEAST 100 CIGARETTES IN YOUR ENTIRE LIFE: YES
DO YOU EVER COUGH UP ANY MUCUS OR PHLEGM?: YES, A FEW DAYS A WEEK OR MONTH

## 2020-01-01 ASSESSMENT — ACTIVITIES OF DAILY LIVING (ADL): TOILETING: INDEPENDENT

## 2020-01-09 ENCOUNTER — ANTICOAGULATION VISIT (OUTPATIENT)
Dept: VASCULAR LAB | Facility: MEDICAL CENTER | Age: 65
End: 2020-01-09
Attending: INTERNAL MEDICINE
Payer: MEDICARE

## 2020-01-09 VITALS
WEIGHT: 265 LBS | BODY MASS INDEX: 38.02 KG/M2 | DIASTOLIC BLOOD PRESSURE: 90 MMHG | HEART RATE: 110 BPM | SYSTOLIC BLOOD PRESSURE: 126 MMHG

## 2020-01-09 DIAGNOSIS — I48.91 ATRIAL FIBRILLATION, UNSPECIFIED TYPE (HCC): ICD-10-CM

## 2020-01-09 DIAGNOSIS — I82.4Y9 DEEP VEIN THROMBOSIS (DVT) OF PROXIMAL LOWER EXTREMITY, UNSPECIFIED CHRONICITY, UNSPECIFIED LATERALITY (HCC): ICD-10-CM

## 2020-01-09 DIAGNOSIS — Z95.2 H/O MITRAL VALVE REPLACEMENT: ICD-10-CM

## 2020-01-09 LAB
INR BLD: 2.8 (ref 0.9–1.2)
INR PPP: 2.8 (ref 2–3.5)

## 2020-01-09 PROCEDURE — 85610 PROTHROMBIN TIME: CPT

## 2020-01-09 PROCEDURE — 99211 OFF/OP EST MAY X REQ PHY/QHP: CPT | Performed by: NURSE PRACTITIONER

## 2020-01-09 RX ORDER — METOPROLOL SUCCINATE 50 MG/1
50 TABLET, EXTENDED RELEASE ORAL DAILY
Qty: 30 TAB | Refills: 0 | Status: SHIPPED | OUTPATIENT
Start: 2020-01-09 | End: 2020-02-11

## 2020-01-09 NOTE — PROGRESS NOTES
Anticoagulation Summary  As of 2020    INR goal:   2.5-3.5   TTR:   64.8 % (4.3 mo)   INR used for dosin.80 (2020)   Warfarin maintenance plan:   7 mg (5 mg x 1 and 2 mg x 1) every Wed; 5 mg (5 mg x 1) all other days   Weekly warfarin total:   37 mg   Plan last modified:   Yelena Carreon AHasmukhPPASQUALE (10/16/2019)   Next INR check:   2020   Target end date:   Indefinite    Indications    H/O mitral valve replacement [Z95.2]  A-fib (HCC) [I48.91]  Deep vein thrombosis (HCC) [I82.409] [I82.409]             Anticoagulation Episode Summary     INR check location:       Preferred lab:       Send INR reminders to:       Comments:         Anticoagulation Care Providers     Provider Role Specialty Phone number    Sumanth Yancey M.D. Referring Cardiology [phone]    [facility] Responsible  [phone]        Anticoagulation Patient Findings      HPI:  Morales Olivier seen in clinic today for follow up on anticoagulation therapy in the presence of MVR, AF, DVT.   In the hospital last month with a gout flare up. Completed a round of abx + steroids.   Denies any diet changes.   No current symptoms of bleeding or thrombosis reported.    A/P:   INR therapeutic.   Continue current regimen. He is establishing with a new PCP at [facility]. He needs a refill of metoprolol. Will send a 30 day refill as a courtesy until he can see his new PCP.   BP recorded in vitals.    Follow up appointment in 4 week(s).    Next Appointment: Thursday, 2020 at 10:45 am.    Yelena LOYA

## 2020-02-06 ENCOUNTER — ANTICOAGULATION VISIT (OUTPATIENT)
Dept: VASCULAR LAB | Facility: MEDICAL CENTER | Age: 65
End: 2020-02-06
Attending: INTERNAL MEDICINE
Payer: MEDICARE

## 2020-02-06 VITALS
WEIGHT: 271 LBS | SYSTOLIC BLOOD PRESSURE: 117 MMHG | BODY MASS INDEX: 38.88 KG/M2 | DIASTOLIC BLOOD PRESSURE: 98 MMHG | HEART RATE: 119 BPM

## 2020-02-06 DIAGNOSIS — Z95.2 H/O MITRAL VALVE REPLACEMENT: ICD-10-CM

## 2020-02-06 DIAGNOSIS — I48.91 ATRIAL FIBRILLATION, UNSPECIFIED TYPE (HCC): ICD-10-CM

## 2020-02-06 DIAGNOSIS — I82.4Y9 DEEP VEIN THROMBOSIS (DVT) OF PROXIMAL LOWER EXTREMITY, UNSPECIFIED CHRONICITY, UNSPECIFIED LATERALITY (HCC): ICD-10-CM

## 2020-02-06 LAB
INR BLD: 2.6 (ref 0.9–1.2)
INR PPP: 2.6 (ref 2–3.5)

## 2020-02-06 PROCEDURE — 99211 OFF/OP EST MAY X REQ PHY/QHP: CPT | Performed by: NURSE PRACTITIONER

## 2020-02-06 PROCEDURE — 85610 PROTHROMBIN TIME: CPT

## 2020-02-06 NOTE — PROGRESS NOTES
Anticoagulation Summary  As of 2020    INR goal:   2.5-3.5   TTR:   71.1 % (5.2 mo)   INR used for dosin.60 (2020)   Warfarin maintenance plan:   7 mg (5 mg x 1 and 2 mg x 1) every Wed; 5 mg (5 mg x 1) all other days   Weekly warfarin total:   37 mg   Plan last modified:   Yelena Carreon AHasmukhPPASQUALE (10/16/2019)   Next INR check:   3/5/2020   Target end date:   Indefinite    Indications    H/O mitral valve replacement [Z95.2]  A-fib (HCC) [I48.91]  Deep vein thrombosis (HCC) [I82.409] [I82.409]             Anticoagulation Episode Summary     INR check location:       Preferred lab:       Send INR reminders to:       Comments:         Anticoagulation Care Providers     Provider Role Specialty Phone number    Sumanth Yancey M.D. Referring Cardiology [phone]    [facility] Responsible  [phone]        Anticoagulation Patient Findings      HPI:  Morales Olivier seen in clinic today for follow up on anticoagulation therapy in the presence of MVR, AF, DVT hx.   Denies any changes to current medical/health status since last appointment.   Denies any medication or diet changes.   No current symptoms of bleeding or thrombosis reported.    A/P:   INR therapeutic. INR has trended down. We discussed a slight dose increase but pt prefers to continue current regimen for now.   BP recorded in vitals.    Follow up appointment in 4 week(s).    Next Appointment: 2020 at 11:00 am.    Yelena LOYA

## 2020-02-11 RX ORDER — METOPROLOL SUCCINATE 50 MG/1
50 TABLET, EXTENDED RELEASE ORAL DAILY
Qty: 30 TAB | Refills: 0 | Status: SHIPPED | OUTPATIENT
Start: 2020-02-11 | End: 2020-03-11

## 2020-02-28 ENCOUNTER — APPOINTMENT (OUTPATIENT)
Dept: RADIOLOGY | Facility: MEDICAL CENTER | Age: 65
DRG: 313 | End: 2020-02-28
Attending: EMERGENCY MEDICINE
Payer: MEDICARE

## 2020-02-28 ENCOUNTER — HOSPITAL ENCOUNTER (INPATIENT)
Facility: MEDICAL CENTER | Age: 65
LOS: 1 days | DRG: 313 | End: 2020-03-02
Attending: EMERGENCY MEDICINE | Admitting: HOSPITALIST
Payer: MEDICARE

## 2020-02-28 DIAGNOSIS — B86 SCABIES: ICD-10-CM

## 2020-02-28 DIAGNOSIS — R07.9 CHEST PAIN, UNSPECIFIED TYPE: ICD-10-CM

## 2020-02-28 LAB
ALBUMIN SERPL BCP-MCNC: 3.7 G/DL (ref 3.2–4.9)
ALBUMIN/GLOB SERPL: 1.5 G/DL
ALP SERPL-CCNC: 56 U/L (ref 30–99)
ALT SERPL-CCNC: 11 U/L (ref 2–50)
ANION GAP SERPL CALC-SCNC: 7 MMOL/L (ref 0–11.9)
APPEARANCE UR: ABNORMAL
AST SERPL-CCNC: 16 U/L (ref 12–45)
BACTERIA #/AREA URNS HPF: NEGATIVE /HPF
BASOPHILS # BLD AUTO: 0.7 % (ref 0–1.8)
BASOPHILS # BLD: 0.04 K/UL (ref 0–0.12)
BILIRUB SERPL-MCNC: 0.4 MG/DL (ref 0.1–1.5)
BILIRUB UR QL STRIP.AUTO: NEGATIVE
BUN SERPL-MCNC: 22 MG/DL (ref 8–22)
CALCIUM SERPL-MCNC: 8.8 MG/DL (ref 8.5–10.5)
CHLORIDE SERPL-SCNC: 108 MMOL/L (ref 96–112)
CO2 SERPL-SCNC: 26 MMOL/L (ref 20–33)
COLOR UR: ABNORMAL
CREAT SERPL-MCNC: 1.4 MG/DL (ref 0.5–1.4)
EKG IMPRESSION: NORMAL
EOSINOPHIL # BLD AUTO: 0.25 K/UL (ref 0–0.51)
EOSINOPHIL NFR BLD: 4.4 % (ref 0–6.9)
EPI CELLS #/AREA URNS HPF: NEGATIVE /HPF
ERYTHROCYTE [DISTWIDTH] IN BLOOD BY AUTOMATED COUNT: 53.1 FL (ref 35.9–50)
GLOBULIN SER CALC-MCNC: 2.4 G/DL (ref 1.9–3.5)
GLUCOSE BLD-MCNC: 183 MG/DL (ref 65–99)
GLUCOSE BLD-MCNC: 98 MG/DL (ref 65–99)
GLUCOSE SERPL-MCNC: 107 MG/DL (ref 65–99)
GLUCOSE UR STRIP.AUTO-MCNC: NEGATIVE MG/DL
HCT VFR BLD AUTO: 39.6 % (ref 42–52)
HGB BLD-MCNC: 13 G/DL (ref 14–18)
HYALINE CASTS #/AREA URNS LPF: ABNORMAL /LPF
IMM GRANULOCYTES # BLD AUTO: 0.03 K/UL (ref 0–0.11)
IMM GRANULOCYTES NFR BLD AUTO: 0.5 % (ref 0–0.9)
INR PPP: 3.23 (ref 0.87–1.13)
KETONES UR STRIP.AUTO-MCNC: NEGATIVE MG/DL
LEUKOCYTE ESTERASE UR QL STRIP.AUTO: ABNORMAL
LYMPHOCYTES # BLD AUTO: 0.82 K/UL (ref 1–4.8)
LYMPHOCYTES NFR BLD: 14.3 % (ref 22–41)
MAGNESIUM SERPL-MCNC: 2.1 MG/DL (ref 1.5–2.5)
MCH RBC QN AUTO: 32.1 PG (ref 27–33)
MCHC RBC AUTO-ENTMCNC: 32.8 G/DL (ref 33.7–35.3)
MCV RBC AUTO: 97.8 FL (ref 81.4–97.8)
MICRO URNS: ABNORMAL
MONOCYTES # BLD AUTO: 0.58 K/UL (ref 0–0.85)
MONOCYTES NFR BLD AUTO: 10.1 % (ref 0–13.4)
NEUTROPHILS # BLD AUTO: 4.02 K/UL (ref 1.82–7.42)
NEUTROPHILS NFR BLD: 70 % (ref 44–72)
NITRITE UR QL STRIP.AUTO: NEGATIVE
NRBC # BLD AUTO: 0 K/UL
NRBC BLD-RTO: 0 /100 WBC
NT-PROBNP SERPL IA-MCNC: 462 PG/ML (ref 0–125)
PH UR STRIP.AUTO: 5.5 [PH] (ref 5–8)
PLATELET # BLD AUTO: 135 K/UL (ref 164–446)
PMV BLD AUTO: 10.5 FL (ref 9–12.9)
POTASSIUM SERPL-SCNC: 4.4 MMOL/L (ref 3.6–5.5)
PROT SERPL-MCNC: 6.1 G/DL (ref 6–8.2)
PROT UR QL STRIP: 300 MG/DL
PROTHROMBIN TIME: 34.3 SEC (ref 12–14.6)
RBC # BLD AUTO: 4.05 M/UL (ref 4.7–6.1)
RBC # URNS HPF: >150 /HPF
RBC UR QL AUTO: ABNORMAL
SODIUM SERPL-SCNC: 141 MMOL/L (ref 135–145)
SP GR UR STRIP.AUTO: 1.03
TROPONIN T SERPL-MCNC: 24 NG/L (ref 6–19)
TROPONIN T SERPL-MCNC: 26 NG/L (ref 6–19)
UROBILINOGEN UR STRIP.AUTO-MCNC: 1 MG/DL
WBC # BLD AUTO: 5.7 K/UL (ref 4.8–10.8)
WBC #/AREA URNS HPF: ABNORMAL /HPF

## 2020-02-28 PROCEDURE — 96374 THER/PROPH/DIAG INJ IV PUSH: CPT

## 2020-02-28 PROCEDURE — 83735 ASSAY OF MAGNESIUM: CPT

## 2020-02-28 PROCEDURE — 84484 ASSAY OF TROPONIN QUANT: CPT

## 2020-02-28 PROCEDURE — 700111 HCHG RX REV CODE 636 W/ 250 OVERRIDE (IP): Performed by: HOSPITALIST

## 2020-02-28 PROCEDURE — 700102 HCHG RX REV CODE 250 W/ 637 OVERRIDE(OP): Performed by: HOSPITALIST

## 2020-02-28 PROCEDURE — 85610 PROTHROMBIN TIME: CPT

## 2020-02-28 PROCEDURE — 82962 GLUCOSE BLOOD TEST: CPT

## 2020-02-28 PROCEDURE — 99285 EMERGENCY DEPT VISIT HI MDM: CPT

## 2020-02-28 PROCEDURE — 93005 ELECTROCARDIOGRAM TRACING: CPT | Performed by: EMERGENCY MEDICINE

## 2020-02-28 PROCEDURE — 36415 COLL VENOUS BLD VENIPUNCTURE: CPT

## 2020-02-28 PROCEDURE — 85025 COMPLETE CBC W/AUTO DIFF WBC: CPT

## 2020-02-28 PROCEDURE — A9270 NON-COVERED ITEM OR SERVICE: HCPCS | Performed by: HOSPITALIST

## 2020-02-28 PROCEDURE — 96376 TX/PRO/DX INJ SAME DRUG ADON: CPT

## 2020-02-28 PROCEDURE — G0378 HOSPITAL OBSERVATION PER HR: HCPCS

## 2020-02-28 PROCEDURE — 81001 URINALYSIS AUTO W/SCOPE: CPT

## 2020-02-28 PROCEDURE — 700105 HCHG RX REV CODE 258: Performed by: HOSPITALIST

## 2020-02-28 PROCEDURE — 80053 COMPREHEN METABOLIC PANEL: CPT

## 2020-02-28 PROCEDURE — 93005 ELECTROCARDIOGRAM TRACING: CPT

## 2020-02-28 PROCEDURE — 71045 X-RAY EXAM CHEST 1 VIEW: CPT

## 2020-02-28 PROCEDURE — 96372 THER/PROPH/DIAG INJ SC/IM: CPT

## 2020-02-28 PROCEDURE — 99220 PR INITIAL OBSERVATION CARE,LEVL III: CPT | Performed by: HOSPITALIST

## 2020-02-28 PROCEDURE — A9270 NON-COVERED ITEM OR SERVICE: HCPCS | Performed by: EMERGENCY MEDICINE

## 2020-02-28 PROCEDURE — 83880 ASSAY OF NATRIURETIC PEPTIDE: CPT

## 2020-02-28 PROCEDURE — 700102 HCHG RX REV CODE 250 W/ 637 OVERRIDE(OP): Performed by: EMERGENCY MEDICINE

## 2020-02-28 RX ORDER — AMOXICILLIN 250 MG
2 CAPSULE ORAL 2 TIMES DAILY
Status: DISCONTINUED | OUTPATIENT
Start: 2020-02-29 | End: 2020-03-02 | Stop reason: HOSPADM

## 2020-02-28 RX ORDER — TAMSULOSIN HYDROCHLORIDE 0.4 MG/1
0.4 CAPSULE ORAL DAILY
COMMUNITY
End: 2020-04-02 | Stop reason: SDUPTHER

## 2020-02-28 RX ORDER — BISACODYL 10 MG
10 SUPPOSITORY, RECTAL RECTAL
Status: DISCONTINUED | OUTPATIENT
Start: 2020-02-28 | End: 2020-03-02 | Stop reason: HOSPADM

## 2020-02-28 RX ORDER — SODIUM CHLORIDE 9 MG/ML
INJECTION, SOLUTION INTRAVENOUS CONTINUOUS
Status: DISCONTINUED | OUTPATIENT
Start: 2020-02-28 | End: 2020-02-28

## 2020-02-28 RX ORDER — PERMETHRIN 50 MG/G
CREAM TOPICAL ONCE
Status: COMPLETED | OUTPATIENT
Start: 2020-02-28 | End: 2020-02-28

## 2020-02-28 RX ORDER — AMOXICILLIN 250 MG
2 CAPSULE ORAL 2 TIMES DAILY
Status: DISCONTINUED | OUTPATIENT
Start: 2020-02-28 | End: 2020-02-28

## 2020-02-28 RX ORDER — DILTIAZEM HYDROCHLORIDE 5 MG/ML
10 INJECTION INTRAVENOUS ONCE
Status: COMPLETED | OUTPATIENT
Start: 2020-02-28 | End: 2020-02-28

## 2020-02-28 RX ORDER — ALLOPURINOL 100 MG/1
100 TABLET ORAL DAILY
Status: DISCONTINUED | OUTPATIENT
Start: 2020-02-29 | End: 2020-03-02 | Stop reason: HOSPADM

## 2020-02-28 RX ORDER — TAMSULOSIN HYDROCHLORIDE 0.4 MG/1
0.4 CAPSULE ORAL DAILY
Status: DISCONTINUED | OUTPATIENT
Start: 2020-02-29 | End: 2020-03-02 | Stop reason: HOSPADM

## 2020-02-28 RX ORDER — POLYETHYLENE GLYCOL 3350 17 G/17G
1 POWDER, FOR SOLUTION ORAL
Status: DISCONTINUED | OUTPATIENT
Start: 2020-02-28 | End: 2020-02-28

## 2020-02-28 RX ORDER — ACETAMINOPHEN 325 MG/1
650 TABLET ORAL EVERY 6 HOURS PRN
Status: DISCONTINUED | OUTPATIENT
Start: 2020-02-28 | End: 2020-03-02 | Stop reason: HOSPADM

## 2020-02-28 RX ORDER — ONDANSETRON 4 MG/1
4 TABLET, ORALLY DISINTEGRATING ORAL EVERY 4 HOURS PRN
Status: DISCONTINUED | OUTPATIENT
Start: 2020-02-28 | End: 2020-02-29

## 2020-02-28 RX ORDER — WARFARIN SODIUM 2 MG/1
2 TABLET ORAL
COMMUNITY
End: 2020-04-02

## 2020-02-28 RX ORDER — POLYETHYLENE GLYCOL 3350 17 G/17G
1 POWDER, FOR SOLUTION ORAL
Status: DISCONTINUED | OUTPATIENT
Start: 2020-02-28 | End: 2020-03-02 | Stop reason: HOSPADM

## 2020-02-28 RX ORDER — WARFARIN SODIUM 5 MG/1
5 TABLET ORAL
Status: DISCONTINUED | OUTPATIENT
Start: 2020-02-28 | End: 2020-03-02 | Stop reason: HOSPADM

## 2020-02-28 RX ORDER — ONDANSETRON 2 MG/ML
4 INJECTION INTRAMUSCULAR; INTRAVENOUS EVERY 4 HOURS PRN
Status: DISCONTINUED | OUTPATIENT
Start: 2020-02-28 | End: 2020-02-29

## 2020-02-28 RX ORDER — METOPROLOL SUCCINATE 50 MG/1
50 TABLET, EXTENDED RELEASE ORAL DAILY
Status: DISCONTINUED | OUTPATIENT
Start: 2020-02-29 | End: 2020-03-02 | Stop reason: HOSPADM

## 2020-02-28 RX ORDER — BISACODYL 10 MG
10 SUPPOSITORY, RECTAL RECTAL
Status: DISCONTINUED | OUTPATIENT
Start: 2020-02-28 | End: 2020-02-28

## 2020-02-28 RX ADMIN — PERMETHRIN: 50 CREAM TOPICAL at 16:00

## 2020-02-28 RX ADMIN — INSULIN HUMAN 2 UNITS: 100 INJECTION, SOLUTION PARENTERAL at 21:58

## 2020-02-28 RX ADMIN — WARFARIN SODIUM 5 MG: 5 TABLET ORAL at 21:57

## 2020-02-28 RX ADMIN — DILTIAZEM HYDROCHLORIDE 10 MG: 5 INJECTION INTRAVENOUS at 22:53

## 2020-02-28 RX ADMIN — SODIUM CHLORIDE: 9 INJECTION, SOLUTION INTRAVENOUS at 18:01

## 2020-02-28 RX ADMIN — DILTIAZEM HYDROCHLORIDE 10 MG: 5 INJECTION INTRAVENOUS at 21:48

## 2020-02-28 ASSESSMENT — COGNITIVE AND FUNCTIONAL STATUS - GENERAL
MOBILITY SCORE: 22
SUGGESTED CMS G CODE MODIFIER DAILY ACTIVITY: CJ
DAILY ACTIVITIY SCORE: 21
TOILETING: A LITTLE
CLIMB 3 TO 5 STEPS WITH RAILING: A LITTLE
HELP NEEDED FOR BATHING: A LITTLE
DRESSING REGULAR UPPER BODY CLOTHING: A LITTLE
SUGGESTED CMS G CODE MODIFIER MOBILITY: CJ
WALKING IN HOSPITAL ROOM: A LITTLE

## 2020-02-28 ASSESSMENT — ENCOUNTER SYMPTOMS
MYALGIAS: 0
COUGH: 0
NAUSEA: 0
SPUTUM PRODUCTION: 0
TINGLING: 0
ABDOMINAL PAIN: 0
WEIGHT LOSS: 0
BRUISES/BLEEDS EASILY: 1
BACK PAIN: 0
DIZZINESS: 0
CONSTIPATION: 0
HEADACHES: 0
DEPRESSION: 0
NERVOUS/ANXIOUS: 0
HEARTBURN: 0
HEMOPTYSIS: 0
DIARRHEA: 0
CHILLS: 0
FEVER: 0
NECK PAIN: 0
BLURRED VISION: 0
VOMITING: 0

## 2020-02-28 ASSESSMENT — FIBROSIS 4 INDEX: FIB4 SCORE: 1.64

## 2020-02-28 ASSESSMENT — LIFESTYLE VARIABLES
ON A TYPICAL DAY WHEN YOU DRINK ALCOHOL HOW MANY DRINKS DO YOU HAVE: 0
HOW MANY TIMES IN THE PAST YEAR HAVE YOU HAD 5 OR MORE DRINKS IN A DAY: 0
HAVE PEOPLE ANNOYED YOU BY CRITICIZING YOUR DRINKING: NO
HAVE YOU EVER FELT YOU SHOULD CUT DOWN ON YOUR DRINKING: NO
CONSUMPTION TOTAL: NEGATIVE
AVERAGE NUMBER OF DAYS PER WEEK YOU HAVE A DRINK CONTAINING ALCOHOL: 0
EVER HAD A DRINK FIRST THING IN THE MORNING TO STEADY YOUR NERVES TO GET RID OF A HANGOVER: NO
ALCOHOL_USE: NO
TOTAL SCORE: 0
EVER FELT BAD OR GUILTY ABOUT YOUR DRINKING: NO
TOTAL SCORE: 0
TOTAL SCORE: 0

## 2020-02-28 NOTE — ED TRIAGE NOTES
Chief Complaint   Patient presents with   • Chest Pain   • Leg Swelling     Pt BIB EMS from PCP. Pt was at 1st appt with a new PCP when he c/o L sided nonrad, CP x this AM. Pt also c/o increased leg swelling. Hx afib, mitral replacement x2, HTN. Pt in gown, chart up for ERP.

## 2020-02-28 NOTE — H&P
Hospital Medicine History & Physical Note    Date of Service  2/28/2020    Primary Care Physician  KRYSTINA English    Consultants  none    Code Status  full    Chief Complaint  Chest pain    History of Presenting Illness  64 y.o. male who presented 2/28/2020 with pmx dm type 2, pacemaker, ckd , cad, afib, mitral valve repair comes to ED with complaint of chest pain.     Left side chest, achy, intensity 4/10, lasting only for a few seconds but did recur  several times during the day. It is worse with exertion. No nausea or vomiting. No sob    No fever or chills    No palpitations or diaphoresis      In ED - he was found to have scabies. His clothes were removed. He was given a shower and treated with permethrin. He was placed in contact isolation.         Review of Systems  Review of Systems   Constitutional: Negative for chills, fever and weight loss.   HENT: Negative for tinnitus.    Eyes: Negative for blurred vision.   Respiratory: Negative for cough, hemoptysis and sputum production.    Cardiovascular: Positive for chest pain and leg swelling.   Gastrointestinal: Negative for abdominal pain, constipation, diarrhea, heartburn, nausea and vomiting.   Genitourinary: Negative for dysuria, frequency and urgency.   Musculoskeletal: Negative for back pain, myalgias and neck pain.   Skin: Negative for itching and rash.   Neurological: Negative for dizziness, tingling and headaches.   Endo/Heme/Allergies: Bruises/bleeds easily.   Psychiatric/Behavioral: Negative for depression. The patient is not nervous/anxious.    All other systems reviewed and are negative.      Past Medical History   has a past medical history of Atrial fibrillation (HCC), Bronchitis, CAD (coronary artery disease), Gout, Heart valve disease, Hypertension, Open wound (9/2/2009), Pacemaker, Personal history of venous thrombosis and embolism (2009), PVC (premature ventricular contraction) (4/13/2019), Renal disorder, and Type II or unspecified  type diabetes mellitus without mention of complication, not stated as uncontrolled.    Surgical History   has a past surgical history that includes mitral valve replace (3/14/08); maze procedure (3/14/08); angiogram (7/3/2009); angiogram (7/4/2009); cath removal (7/4/2009); thrombectomy (7/4/2009); embolectomy (7/4/2009); irrigation & debridement general (7/20/2009); wide excision (7/20/2009); other cardiac surgery (2008); other abdominal surgery; mitral valve replace (3/8/2017); and lopez (3/8/2017).     Family History  No family hx of cad     Social History   reports that he quit smoking about 39 years ago. He smoked 0.00 packs per day. He has never used smokeless tobacco. He reports that he does not drink alcohol or use drugs.    Allergies  No Known Allergies    Medications  Prior to Admission Medications   Prescriptions Last Dose Informant Patient Reported? Taking?   allopurinol (ZYLOPRIM) 100 MG Tab 2/28/2020 at AM Patient No No   Sig: Take 1 Tab by mouth every day for 180 days.   metFORMIN (GLUCOPHAGE) 500 MG Tab 2/28/2020 at AM Patient Yes Yes   Sig: Take 500 mg by mouth 2 times a day, with meals.   metoprolol SR (TOPROL XL) 50 MG TABLET SR 24 HR 2/28/2020 at AM Patient No No   Sig: Take 1 Tab by mouth every day. Needs OV for further fills or route to PCP   tamsulosin (FLOMAX) 0.4 MG capsule 2/28/2020 at AM Patient Yes Yes   Sig: Take 0.4 mg by mouth every day.   warfarin (COUMADIN) 2 MG Tab 2/26/2020 at PM Patient Yes Yes   Sig: Take 2 mg by mouth every day. 7 mg on Wednesday   5 mg all other days   warfarin (COUMADIN) 5 MG Tab 2/27/2020 at PM Patient Yes No   Sig: Take 5 mg by mouth See Admin Instructions. 7 mg on Wednesday   5 mg all other days      Facility-Administered Medications: None       Physical Exam  Temp:  [36.8 °C (98.3 °F)] 36.8 °C (98.3 °F)  Pulse:  [78-89] 88  Resp:  [14-18] 14  BP: (101-120)/(58-81) 115/75  SpO2:  [97 %-98 %] 98 %    Physical Exam  Vitals signs reviewed.   Constitutional:        General: He is not in acute distress.     Appearance: Normal appearance. He is not ill-appearing.   HENT:      Head: Normocephalic and atraumatic.      Nose: Nose normal.      Mouth/Throat:      Mouth: Mucous membranes are moist.      Comments: Poor dentition  Eyes:      General: No scleral icterus.     Extraocular Movements: Extraocular movements intact.      Pupils: Pupils are equal, round, and reactive to light.   Neck:      Musculoskeletal: Normal range of motion. No neck rigidity or muscular tenderness.      Vascular: No carotid bruit.   Cardiovascular:      Rate and Rhythm: Normal rate. Rhythm irregular.      Pulses: Normal pulses.      Heart sounds: Murmur present.   Pulmonary:      Effort: Pulmonary effort is normal. No respiratory distress.      Breath sounds: No stridor. No wheezing or rhonchi.   Abdominal:      General: Abdomen is flat. There is no distension.      Palpations: There is no mass.      Tenderness: There is no abdominal tenderness. There is no guarding or rebound.      Hernia: No hernia is present.   Musculoskeletal: Normal range of motion.         General: No swelling, tenderness, deformity or signs of injury.      Right lower leg: Edema present.      Left lower leg: Edema present.   Lymphadenopathy:      Cervical: No cervical adenopathy.   Skin:     General: Skin is warm.      Capillary Refill: Capillary refill takes 2 to 3 seconds.      Coloration: Skin is not jaundiced or pale.      Findings: Lesion (multiple excoriations of arms and legs) present. No bruising or erythema.   Neurological:      General: No focal deficit present.      Mental Status: He is alert and oriented to person, place, and time. Mental status is at baseline.      Cranial Nerves: No cranial nerve deficit.      Sensory: No sensory deficit.      Motor: No weakness.      Coordination: Coordination normal.         Laboratory:  Recent Labs     02/28/20  1211   WBC 5.7   RBC 4.05*   HEMOGLOBIN 13.0*   HEMATOCRIT 39.6*   MCV  97.8   MCH 32.1   MCHC 32.8*   RDW 53.1*   PLATELETCT 135*   MPV 10.5     Recent Labs     02/28/20  1211   SODIUM 141   POTASSIUM 4.4   CHLORIDE 108   CO2 26   GLUCOSE 107*   BUN 22   CREATININE 1.40   CALCIUM 8.8     Recent Labs     02/28/20  1211   ALTSGPT 11   ASTSGOT 16   ALKPHOSPHAT 56   TBILIRUBIN 0.4   GLUCOSE 107*     Recent Labs     02/28/20  1211   INR 3.23*     Recent Labs     02/28/20  1211   NTPROBNP 462*         Recent Labs     02/28/20  1211   TROPONINT 26*       Urinalysis:    No results found     Imaging:  DX-CHEST-PORTABLE (1 VIEW)   Final Result         80      NM-CARDIAC STRESS TEST    (Results Pending)     ekg reviewed by me, paced rhythms, rate 83     Assessment/Plan:  I anticipate this patient is appropriate for observation status at this time.    Chest pain- (present on admission)  Assessment & Plan  Serial troponins    Stress test ordered    Gout- (present on admission)  Assessment & Plan  Cont allopurinol    Paroxysmal atrial fibrillation (HCC)- (present on admission)  Assessment & Plan  Lopressor  coumadin    Scabies- (present on admission)  Assessment & Plan  Treated    Isolation, as per hospital policies    Type 2 diabetes mellitus without complication, without long-term current use of insulin (HCC)- (present on admission)  Assessment & Plan  Hold metfomin    fs with ssi    Benign prostatic hyperplasia without lower urinary tract symptoms- (present on admission)  Assessment & Plan  flomax      VTE prophylaxis: coumadin

## 2020-02-29 ENCOUNTER — APPOINTMENT (OUTPATIENT)
Dept: RADIOLOGY | Facility: MEDICAL CENTER | Age: 65
DRG: 313 | End: 2020-02-29
Attending: HOSPITALIST
Payer: MEDICARE

## 2020-02-29 PROBLEM — B86 SCABIES: Status: ACTIVE | Noted: 2020-02-29

## 2020-02-29 LAB
ANION GAP SERPL CALC-SCNC: 11 MMOL/L (ref 0–11.9)
BUN SERPL-MCNC: 22 MG/DL (ref 8–22)
CALCIUM SERPL-MCNC: 8.7 MG/DL (ref 8.5–10.5)
CHLORIDE SERPL-SCNC: 109 MMOL/L (ref 96–112)
CO2 SERPL-SCNC: 20 MMOL/L (ref 20–33)
CREAT SERPL-MCNC: 1.14 MG/DL (ref 0.5–1.4)
D DIMER PPP IA.FEU-MCNC: 0.48 UG/ML (FEU) (ref 0–0.5)
EKG IMPRESSION: NORMAL
ERYTHROCYTE [DISTWIDTH] IN BLOOD BY AUTOMATED COUNT: 52.8 FL (ref 35.9–50)
GLUCOSE BLD-MCNC: 111 MG/DL (ref 65–99)
GLUCOSE BLD-MCNC: 121 MG/DL (ref 65–99)
GLUCOSE BLD-MCNC: 174 MG/DL (ref 65–99)
GLUCOSE BLD-MCNC: 98 MG/DL (ref 65–99)
GLUCOSE SERPL-MCNC: 128 MG/DL (ref 65–99)
HCT VFR BLD AUTO: 40.3 % (ref 42–52)
HGB BLD-MCNC: 12.7 G/DL (ref 14–18)
INR PPP: 2.94 (ref 0.87–1.13)
MCH RBC QN AUTO: 30.8 PG (ref 27–33)
MCHC RBC AUTO-ENTMCNC: 31.5 G/DL (ref 33.7–35.3)
MCV RBC AUTO: 97.6 FL (ref 81.4–97.8)
PLATELET # BLD AUTO: 138 K/UL (ref 164–446)
PMV BLD AUTO: 9.7 FL (ref 9–12.9)
POTASSIUM SERPL-SCNC: 4.2 MMOL/L (ref 3.6–5.5)
PROTHROMBIN TIME: 31.8 SEC (ref 12–14.6)
RBC # BLD AUTO: 4.13 M/UL (ref 4.7–6.1)
SODIUM SERPL-SCNC: 140 MMOL/L (ref 135–145)
TROPONIN T SERPL-MCNC: 22 NG/L (ref 6–19)
WBC # BLD AUTO: 6.3 K/UL (ref 4.8–10.8)

## 2020-02-29 PROCEDURE — 85379 FIBRIN DEGRADATION QUANT: CPT

## 2020-02-29 PROCEDURE — 93005 ELECTROCARDIOGRAM TRACING: CPT | Performed by: HOSPITALIST

## 2020-02-29 PROCEDURE — 700111 HCHG RX REV CODE 636 W/ 250 OVERRIDE (IP): Performed by: HOSPITALIST

## 2020-02-29 PROCEDURE — A9270 NON-COVERED ITEM OR SERVICE: HCPCS | Performed by: HOSPITALIST

## 2020-02-29 PROCEDURE — 93010 ELECTROCARDIOGRAM REPORT: CPT | Performed by: INTERNAL MEDICINE

## 2020-02-29 PROCEDURE — 96372 THER/PROPH/DIAG INJ SC/IM: CPT

## 2020-02-29 PROCEDURE — 700105 HCHG RX REV CODE 258

## 2020-02-29 PROCEDURE — 85610 PROTHROMBIN TIME: CPT

## 2020-02-29 PROCEDURE — 82962 GLUCOSE BLOOD TEST: CPT | Mod: 91

## 2020-02-29 PROCEDURE — G0378 HOSPITAL OBSERVATION PER HR: HCPCS

## 2020-02-29 PROCEDURE — 36415 COLL VENOUS BLD VENIPUNCTURE: CPT

## 2020-02-29 PROCEDURE — 700105 HCHG RX REV CODE 258: Performed by: HOSPITALIST

## 2020-02-29 PROCEDURE — 700102 HCHG RX REV CODE 250 W/ 637 OVERRIDE(OP): Performed by: HOSPITALIST

## 2020-02-29 PROCEDURE — 96376 TX/PRO/DX INJ SAME DRUG ADON: CPT

## 2020-02-29 PROCEDURE — 85027 COMPLETE CBC AUTOMATED: CPT

## 2020-02-29 PROCEDURE — 99225 PR SUBSEQUENT OBSERVATION CARE,LEVEL II: CPT | Performed by: INTERNAL MEDICINE

## 2020-02-29 PROCEDURE — 84484 ASSAY OF TROPONIN QUANT: CPT

## 2020-02-29 PROCEDURE — 80048 BASIC METABOLIC PNL TOTAL CA: CPT

## 2020-02-29 RX ORDER — ALPRAZOLAM 0.25 MG/1
0.25 TABLET ORAL ONCE
Status: COMPLETED | OUTPATIENT
Start: 2020-02-29 | End: 2020-02-29

## 2020-02-29 RX ORDER — SODIUM CHLORIDE 9 MG/ML
INJECTION, SOLUTION INTRAVENOUS
Status: COMPLETED
Start: 2020-02-29 | End: 2020-02-29

## 2020-02-29 RX ORDER — SODIUM CHLORIDE 9 MG/ML
250 INJECTION, SOLUTION INTRAVENOUS ONCE
Status: COMPLETED | OUTPATIENT
Start: 2020-02-29 | End: 2020-02-29

## 2020-02-29 RX ORDER — DILTIAZEM HYDROCHLORIDE 5 MG/ML
INJECTION INTRAVENOUS
Status: DISCONTINUED
Start: 2020-02-29 | End: 2020-02-29

## 2020-02-29 RX ORDER — DILTIAZEM HYDROCHLORIDE 5 MG/ML
10 INJECTION INTRAVENOUS ONCE
Status: COMPLETED | OUTPATIENT
Start: 2020-02-29 | End: 2020-02-29

## 2020-02-29 RX ADMIN — SODIUM CHLORIDE: 9 INJECTION, SOLUTION INTRAVENOUS at 00:30

## 2020-02-29 RX ADMIN — INSULIN HUMAN 2 UNITS: 100 INJECTION, SOLUTION PARENTERAL at 22:53

## 2020-02-29 RX ADMIN — WARFARIN SODIUM 5 MG: 5 TABLET ORAL at 17:49

## 2020-02-29 RX ADMIN — SODIUM CHLORIDE 250 ML: 9 INJECTION, SOLUTION INTRAVENOUS at 00:45

## 2020-02-29 RX ADMIN — METOPROLOL SUCCINATE 50 MG: 50 TABLET, EXTENDED RELEASE ORAL at 06:25

## 2020-02-29 RX ADMIN — ALPRAZOLAM 0.25 MG: 0.25 TABLET ORAL at 22:53

## 2020-02-29 RX ADMIN — TAMSULOSIN HYDROCHLORIDE 0.4 MG: 0.4 CAPSULE ORAL at 06:25

## 2020-02-29 RX ADMIN — SENNOSIDES AND DOCUSATE SODIUM 2 TABLET: 8.6; 5 TABLET ORAL at 17:51

## 2020-02-29 RX ADMIN — ALLOPURINOL 100 MG: 100 TABLET ORAL at 06:25

## 2020-02-29 RX ADMIN — DILTIAZEM HYDROCHLORIDE 10 MG: 5 INJECTION INTRAVENOUS at 02:01

## 2020-02-29 ASSESSMENT — CHA2DS2 SCORE
CHA2DS2 VASC SCORE: 3
CHF OR LEFT VENTRICULAR DYSFUNCTION: NO
VASCULAR DISEASE: NO
AGE 65 TO 74: YES
PRIOR STROKE OR TIA OR THROMBOEMBOLISM: NO
AGE 75 OR GREATER: NO
HYPERTENSION: YES
DIABETES: YES

## 2020-02-29 ASSESSMENT — ENCOUNTER SYMPTOMS
MYALGIAS: 0
TINGLING: 0
DEPRESSION: 0
NERVOUS/ANXIOUS: 0
FEVER: 0
DIZZINESS: 0
ABDOMINAL PAIN: 0
CONSTIPATION: 0
HEARTBURN: 0
NAUSEA: 0
SHORTNESS OF BREATH: 0
SINUS PAIN: 0
COUGH: 0
CHILLS: 0
BLURRED VISION: 0
WEIGHT LOSS: 0
BRUISES/BLEEDS EASILY: 1
BACK PAIN: 0
HEADACHES: 0
VOMITING: 0
DIARRHEA: 0
HEMOPTYSIS: 0
NECK PAIN: 0
SPUTUM PRODUCTION: 0

## 2020-02-29 ASSESSMENT — FIBROSIS 4 INDEX: FIB4 SCORE: 2.27

## 2020-02-29 NOTE — PROGRESS NOTES
Tachycardia and increased work of breathing persisting after 2 doses of diltiazem and 250 ml bolus. Updated Dr Solomon, d-dimer, EKG and a 3rd dose ordered. Will continue to monitor

## 2020-02-29 NOTE — PROGRESS NOTES
Inpatient Anticoagulation Service Note    Date: 2/28/2020    Reason for Anticoagulation: Atrial Fibrillation, Deep Vein Thrombosis, Bioprosthetic mitral Valve Replacement     Target INR: 2.5 to 3.5         Hemoglobin Value: (!) 13  Hematocrit Value: (!) 39.6  Lab Platelet Value: (!) 135    INR from last 7 days     Date/Time INR Value    02/28/20 1211  (!) 3.23        Dose from last 7 days     Date/Time Dose (mg)    02/28/20 2042  5        Bridge Therapy: No     Reversal Agent Administered: Not Applicable    Comments: INR at goal per goal 2.5 - 3.5 for a-fib, DVT, and bovine mitral valve replacement. Prior to hospital arrival patient was taking warfarin 7 mg on Wednesday and 5 mg all other days. Managed by cardiology clinic with TTR 71.1% (5.2 mo) last appointment 2/6/2020. Admitted for chest pain observation. Diet ordered. H/H stable no overt s/sx of bleeding.     Plan: warfarin 7 mg on Wednesday and warfarin 5 mg on all other days and adjust according to INR results, INR in AM     Education Material Provided?: No    Pharmacist suggested discharge dosing: Continue home dosing strategy for warfarin 7 mg on wednesday and warfarin 5 mg on all other days. Consider follow up INR in 48 to 72 hours after patient discharges from the hospital.     Albania Haq, PharmD

## 2020-02-29 NOTE — PROGRESS NOTES
2 RN skin check complete with Harry AKERS.   Devices in place tele box.  Skin assessed under devices yes.  Confirmed pressure ulcers found on n/a.  New potential pressure ulcers noted on n/a.  The following interventions in place pressure redistribution mattress, frequent turning and ambulation, pillows for support and positioning    Scratch marks and red rash on bilat LE, chest area, and R UE from scabies rash. Sacrum is red and blanching

## 2020-02-29 NOTE — CARE PLAN
Problem: Communication  Goal: The ability to communicate needs accurately and effectively will improve  Outcome: PROGRESSING AS EXPECTED   Patient educated to utilize call light. Patient oriented to hospital room. Patient encouraged to ask questions about plan of care. Patient effectively uses call light and is involved in POC.   Problem: Safety  Goal: Will remain free from injury  Outcome: PROGRESSING AS EXPECTED   Patient's risk for injury and falls assessed. Appropriate safety precautions in place. Patient educated to utilize call light for needs. Patient verbalizes understanding.

## 2020-02-29 NOTE — ED NOTES
Pt assisted to stand at EOB. Pt voided approx 200ml coffee colored urine. UA collected and sent to lab.

## 2020-02-29 NOTE — ED NOTES
Shower complete. Permethrin cream applied to pt entire body. Pt now in clean gown.   Awaiting inpt bed assignment. Pt updated to POC.

## 2020-02-29 NOTE — PROGRESS NOTES
Received Bedside report. Assumed care at 1900. This pt is AOx4, ambulatory with sba, voiding in urinal, reports no pain. Patient and RN discussed plan of care: questions answered. Labs noted, assessment complete, patient tolerating cardiac DM diet. Tele box in place. Pt is on RA. Call light in place, fall precautions in place, patient educated on importance of calling for assistance. No additional needs at this time. VSS   Contact precautions in place, EKG ordered by MD

## 2020-02-29 NOTE — PROGRESS NOTES
[facility] Medicine Daily Progress Note    Date of Service  2/29/2020    Chief Complaint  64 y.o. male admitted 2/28/2020 with chest pain     Hospital Course    This is a 63 y/o M with PMHX of dm type 2, pacemaker, ckd , cad, afib, mitral valve repair comes to ED with complaint of chest pain. Pt admitted for further work up.       Interval Problem Update  Pt seen and examined, his CP has resolved, afebrile, no nausea or vomiting  Pending stress test.     Consultants/Specialty  None     Code Status  Full Code     Disposition  TBD     Review of Systems  Review of Systems   Constitutional: Negative for chills, fever and weight loss.   HENT: Negative for sinus pain and tinnitus.    Eyes: Negative for blurred vision.   Respiratory: Negative for cough, hemoptysis, sputum production and shortness of breath.    Cardiovascular: Positive for chest pain and leg swelling.   Gastrointestinal: Negative for abdominal pain, constipation, diarrhea, heartburn, nausea and vomiting.   Genitourinary: Negative for dysuria, frequency and urgency.   Musculoskeletal: Negative for back pain, joint pain, myalgias and neck pain.   Skin: Negative for itching and rash.   Neurological: Negative for dizziness, tingling and headaches.   Endo/Heme/Allergies: Bruises/bleeds easily.   Psychiatric/Behavioral: Negative for depression. The patient is not nervous/anxious.    All other systems reviewed and are negative.       Physical Exam  Temp:  [36.6 °C (97.8 °F)-36.9 °C (98.4 °F)] 36.6 °C (97.8 °F)  Pulse:  [] 62  Resp:  [16-22] 20  BP: (108-130)/(69-97) 122/94  SpO2:  [93 %-97 %] 95 %    Physical Exam    Fluids    Intake/Output Summary (Last 24 hours) at 2/29/2020 1419  Last data filed at 2/29/2020 1100  Gross per 24 hour   Intake 450 ml   Output 850 ml   Net -400 ml       Laboratory  Recent Labs     02/28/20  1211 02/29/20  0306   WBC 5.7 6.3   RBC 4.05* 4.13*   HEMOGLOBIN 13.0* 12.7*   HEMATOCRIT 39.6* 40.3*   MCV 97.8 97.6   MCH 32.1 30.8    MCHC 32.8* 31.5*   RDW 53.1* 52.8*   PLATELETCT 135* 138*   MPV 10.5 9.7     Recent Labs     02/28/20  1211 02/29/20  0306   SODIUM 141 140   POTASSIUM 4.4 4.2   CHLORIDE 108 109   CO2 26 20   GLUCOSE 107* 128*   BUN 22 22   CREATININE 1.40 1.14   CALCIUM 8.8 8.7     Recent Labs     02/28/20  1211 02/29/20  0306   INR 3.23* 2.94*               Imaging  DX-CHEST-PORTABLE (1 VIEW)   Final Result         80      NM-CARDIAC STRESS TEST    (Results Pending)        Assessment/Plan  Chest pain- (present on admission)  Assessment & Plan  Serial troponin trending down   Stress test ordered    Gout- (present on admission)  Assessment & Plan  Cont allopurinol    Paroxysmal atrial fibrillation (HCC)- (present on admission)  Assessment & Plan  Lopressor  coumadin    Scabies- (present on admission)  Assessment & Plan  Treated  Isolation, as per hospital policies    Type 2 diabetes mellitus without complication, without long-term current use of insulin (HCC)- (present on admission)  Assessment & Plan  Hold metfomin  Cont on ssi and accuchecks     Benign prostatic hyperplasia without lower urinary tract symptoms- (present on admission)  Assessment & Plan  flomax         VTE prophylaxis: warfarin

## 2020-02-29 NOTE — RESPIRATORY CARE
COPD EDUCATION by COPD CLINICAL EDUCATOR  2/29/2020 at 6:20 AM by Ivonne Leigh, ELLA     Patient reviewed by COPD education team. Patient does not have a history or diagnosis of COPD and is a non-smoker, therefore does not qualify for the COPD program.

## 2020-02-29 NOTE — PROGRESS NOTES
Paged Dr Solomon regarding pt sustaining in the 140s and up to 160. Orders given. Will continue to monitor     30 mins post admin, . Per Dr Solomon, 2nd dose ordered and given.

## 2020-03-01 ENCOUNTER — APPOINTMENT (OUTPATIENT)
Dept: RADIOLOGY | Facility: MEDICAL CENTER | Age: 65
DRG: 313 | End: 2020-03-01
Attending: HOSPITALIST
Payer: MEDICARE

## 2020-03-01 LAB
ANION GAP SERPL CALC-SCNC: 7 MMOL/L (ref 0–11.9)
BUN SERPL-MCNC: 18 MG/DL (ref 8–22)
CALCIUM SERPL-MCNC: 9.4 MG/DL (ref 8.5–10.5)
CHLORIDE SERPL-SCNC: 103 MMOL/L (ref 96–112)
CO2 SERPL-SCNC: 26 MMOL/L (ref 20–33)
CREAT SERPL-MCNC: 1.32 MG/DL (ref 0.5–1.4)
EKG IMPRESSION: NORMAL
ERYTHROCYTE [DISTWIDTH] IN BLOOD BY AUTOMATED COUNT: 51.5 FL (ref 35.9–50)
GLUCOSE BLD-MCNC: 102 MG/DL (ref 65–99)
GLUCOSE BLD-MCNC: 125 MG/DL (ref 65–99)
GLUCOSE BLD-MCNC: 126 MG/DL (ref 65–99)
GLUCOSE BLD-MCNC: 128 MG/DL (ref 65–99)
GLUCOSE SERPL-MCNC: 130 MG/DL (ref 65–99)
HCT VFR BLD AUTO: 40.4 % (ref 42–52)
HGB BLD-MCNC: 13.2 G/DL (ref 14–18)
INR PPP: 2.62 (ref 0.87–1.13)
MCH RBC QN AUTO: 31.6 PG (ref 27–33)
MCHC RBC AUTO-ENTMCNC: 32.7 G/DL (ref 33.7–35.3)
MCV RBC AUTO: 96.7 FL (ref 81.4–97.8)
PLATELET # BLD AUTO: 133 K/UL (ref 164–446)
PMV BLD AUTO: 9.6 FL (ref 9–12.9)
POTASSIUM SERPL-SCNC: 4.5 MMOL/L (ref 3.6–5.5)
PROTHROMBIN TIME: 29 SEC (ref 12–14.6)
RBC # BLD AUTO: 4.18 M/UL (ref 4.7–6.1)
SODIUM SERPL-SCNC: 136 MMOL/L (ref 135–145)
WBC # BLD AUTO: 6.4 K/UL (ref 4.8–10.8)

## 2020-03-01 PROCEDURE — 36415 COLL VENOUS BLD VENIPUNCTURE: CPT

## 2020-03-01 PROCEDURE — A9270 NON-COVERED ITEM OR SERVICE: HCPCS | Performed by: HOSPITALIST

## 2020-03-01 PROCEDURE — G0378 HOSPITAL OBSERVATION PER HR: HCPCS

## 2020-03-01 PROCEDURE — A9270 NON-COVERED ITEM OR SERVICE: HCPCS | Performed by: INTERNAL MEDICINE

## 2020-03-01 PROCEDURE — 700102 HCHG RX REV CODE 250 W/ 637 OVERRIDE(OP): Performed by: INTERNAL MEDICINE

## 2020-03-01 PROCEDURE — 93010 ELECTROCARDIOGRAM REPORT: CPT | Performed by: INTERNAL MEDICINE

## 2020-03-01 PROCEDURE — 82962 GLUCOSE BLOOD TEST: CPT | Mod: 91

## 2020-03-01 PROCEDURE — 700111 HCHG RX REV CODE 636 W/ 250 OVERRIDE (IP)

## 2020-03-01 PROCEDURE — 85610 PROTHROMBIN TIME: CPT

## 2020-03-01 PROCEDURE — 700102 HCHG RX REV CODE 250 W/ 637 OVERRIDE(OP): Performed by: HOSPITALIST

## 2020-03-01 PROCEDURE — 770020 HCHG ROOM/CARE - TELE (206)

## 2020-03-01 PROCEDURE — A9502 TC99M TETROFOSMIN: HCPCS

## 2020-03-01 PROCEDURE — 85027 COMPLETE CBC AUTOMATED: CPT

## 2020-03-01 PROCEDURE — 80048 BASIC METABOLIC PNL TOTAL CA: CPT

## 2020-03-01 PROCEDURE — 93005 ELECTROCARDIOGRAM TRACING: CPT | Performed by: INTERNAL MEDICINE

## 2020-03-01 PROCEDURE — 96375 TX/PRO/DX INJ NEW DRUG ADDON: CPT

## 2020-03-01 PROCEDURE — 99232 SBSQ HOSP IP/OBS MODERATE 35: CPT | Performed by: INTERNAL MEDICINE

## 2020-03-01 RX ORDER — DIPHENHYDRAMINE HCL 25 MG
25 TABLET ORAL ONCE
Status: COMPLETED | OUTPATIENT
Start: 2020-03-01 | End: 2020-03-01

## 2020-03-01 RX ORDER — DIPHENHYDRAMINE HCL 25 MG
25 TABLET ORAL EVERY 6 HOURS PRN
Status: DISCONTINUED | OUTPATIENT
Start: 2020-03-01 | End: 2020-03-02 | Stop reason: HOSPADM

## 2020-03-01 RX ORDER — REGADENOSON 0.08 MG/ML
0.4 INJECTION, SOLUTION INTRAVENOUS ONCE
Status: COMPLETED | OUTPATIENT
Start: 2020-03-01 | End: 2020-03-01

## 2020-03-01 RX ORDER — REGADENOSON 0.08 MG/ML
INJECTION, SOLUTION INTRAVENOUS
Status: COMPLETED
Start: 2020-03-01 | End: 2020-03-01

## 2020-03-01 RX ADMIN — REGADENOSON 0.4 MG: 0.08 INJECTION, SOLUTION INTRAVENOUS at 10:06

## 2020-03-01 RX ADMIN — SENNOSIDES AND DOCUSATE SODIUM 2 TABLET: 8.6; 5 TABLET ORAL at 05:06

## 2020-03-01 RX ADMIN — METOPROLOL SUCCINATE 50 MG: 50 TABLET, EXTENDED RELEASE ORAL at 05:06

## 2020-03-01 RX ADMIN — DIPHENHYDRAMINE HYDROCHLORIDE 25 MG: 25 TABLET ORAL at 19:32

## 2020-03-01 RX ADMIN — ALLOPURINOL 100 MG: 100 TABLET ORAL at 05:07

## 2020-03-01 RX ADMIN — SENNOSIDES AND DOCUSATE SODIUM 2 TABLET: 8.6; 5 TABLET ORAL at 16:44

## 2020-03-01 RX ADMIN — TAMSULOSIN HYDROCHLORIDE 0.4 MG: 0.4 CAPSULE ORAL at 05:07

## 2020-03-01 RX ADMIN — WARFARIN SODIUM 5 MG: 5 TABLET ORAL at 16:45

## 2020-03-01 RX ADMIN — DIPHENHYDRAMINE HYDROCHLORIDE 25 MG: 25 TABLET ORAL at 12:00

## 2020-03-01 ASSESSMENT — CHA2DS2 SCORE
PRIOR STROKE OR TIA OR THROMBOEMBOLISM: NO
VASCULAR DISEASE: NO
CHF OR LEFT VENTRICULAR DYSFUNCTION: NO
DIABETES: YES
AGE 75 OR GREATER: NO
AGE 65 TO 74: YES
HYPERTENSION: YES
CHA2DS2 VASC SCORE: 3
SEX: MALE

## 2020-03-01 ASSESSMENT — ENCOUNTER SYMPTOMS
SHORTNESS OF BREATH: 0
COUGH: 0
CONSTIPATION: 0
DIARRHEA: 0
MYALGIAS: 0
DEPRESSION: 0
WEIGHT LOSS: 0
CHILLS: 0
BACK PAIN: 0
SPUTUM PRODUCTION: 0
NAUSEA: 0
HEARTBURN: 0
FEVER: 0
NERVOUS/ANXIOUS: 0
NECK PAIN: 0
ABDOMINAL PAIN: 0
HEMOPTYSIS: 0
HEADACHES: 0
BRUISES/BLEEDS EASILY: 1
BLURRED VISION: 0
VOMITING: 0
DIZZINESS: 0
SINUS PAIN: 0
TINGLING: 0

## 2020-03-01 NOTE — PROGRESS NOTES
Received bedside report from day nurse, pt awake, no signs of respiratory distress, able to make needs known.

## 2020-03-01 NOTE — PROGRESS NOTES
Monitor summary    Paced, ST w/ bigem  , up to 165 not sustaining,  F PVC, R PACS, [id number] converted

## 2020-03-01 NOTE — PROGRESS NOTES
Inpatient Anticoagulation Service Note    Date: 2/29/2020    Reason for Anticoagulation: Atrial Fibrillation, Deep Vein Thrombosis, Bioprosthetic Valve Replacement   Target INR: 2.0 to 3.0  [id number] VASc Score: 3  HAS-BLED Score: 0   Hemoglobin Value: (!) 12.7  Hematocrit Value: (!) 40.3  Lab Platelet Value: (!) 138    INR from last 7 days     Date/Time INR Value    02/29/20 0306  (!) 2.94    02/28/20 1211  (!) 3.23        Dose from last 7 days     Date/Time Dose (mg)    02/29/20 1530  5    02/28/20 2042  5        Bridge Therapy: No    Reversal Agent Administered: Not Applicable    Comments: Patient has Afib, DVT, and a bioprosthetic mitral valve. Home dosing of 7 mg Wednesday and 5 mg AOD has patient's INR at 2.94. Patient's goal INR listed at 2.5 to 3.5, although no clear reason for this goal level noted as patient does not have a mechanical mitral valve but instead bioprosthetic. Either way patient still therapeutic with both of those target ranges at 2.94 so home dosing continued. Will further clarify INR goals tomorrow and will assess INR tomorrow. Patient has no signs/symptoms of bleeding noted and no new pertinent drug drug interactions noted.     Plan:  5 mg   Education Material Provided?: No  Pharmacist suggested discharge dosing: Continue home dosing strategy for warfarin 7 mg on wednesday and warfarin 5 mg on all other days with follow up within 72 hours of discharge.      Davidson Hidalgo, PharmD

## 2020-03-01 NOTE — PROGRESS NOTES
Pt , not sustaining at 0400 was notified by monitor tech, pt. Asymptomatic, stated they had just sit down in bed after going to the BR, will continue to monitor.

## 2020-03-01 NOTE — CARE PLAN
Problem: Safety  Goal: Will remain free from injury  Outcome: PROGRESSING AS EXPECTED  Note: Pt calls appropriately, call light within reach, pt's rm by nurses station      Problem: Pain Management  Goal: Pain level will decrease to patient's comfort goal  Outcome: PROGRESSING AS EXPECTED  Note: Pt denies any pain, will continue to monitor

## 2020-03-01 NOTE — PROGRESS NOTES
[facility] Medicine Daily Progress Note    Date of Service  3/1/2020    Chief Complaint  64 y.o. male admitted 2/28/2020 with chest pain     Hospital Course    This is a 63 y/o M with PMHX of dm type 2, pacemaker, ckd , cad, afib, mitral valve repair comes to ED with complaint of chest pain. Pt admitted for further work up.       Interval Problem Update  No overnight events  his CP has resolved, afebrile, no nausea or vomiting  Stress test today    Consultants/Specialty  None     Code Status  Full Code     Disposition  TBD     Review of Systems  Review of Systems   Constitutional: Negative for chills, fever and weight loss.   HENT: Negative for sinus pain and tinnitus.    Eyes: Negative for blurred vision.   Respiratory: Negative for cough, hemoptysis, sputum production and shortness of breath.    Cardiovascular: Positive for chest pain and leg swelling.   Gastrointestinal: Negative for abdominal pain, constipation, diarrhea, heartburn, nausea and vomiting.   Genitourinary: Negative for dysuria, frequency and urgency.   Musculoskeletal: Negative for back pain, joint pain, myalgias and neck pain.   Skin: Negative for itching and rash.   Neurological: Negative for dizziness, tingling and headaches.   Endo/Heme/Allergies: Bruises/bleeds easily.   Psychiatric/Behavioral: Negative for depression. The patient is not nervous/anxious.    All other systems reviewed and are negative.       Physical Exam  Temp:  [36.5 °C (97.7 °F)-36.7 °C (98 °F)] 36.5 °C (97.7 °F)  Pulse:  [81-88] 88  Resp:  [17-18] 18  BP: (119-129)/(75-99) 126/75  SpO2:  [93 %-99 %] 99 %    Physical Exam    Fluids    Intake/Output Summary (Last 24 hours) at 3/1/2020 1318  Last data filed at 3/1/2020 0500  Gross per 24 hour   Intake 170 ml   Output 200 ml   Net -30 ml       Laboratory  Recent Labs     02/28/20  1211 02/29/20  0306 03/01/20  1110   WBC 5.7 6.3 6.4   RBC 4.05* 4.13* 4.18*   HEMOGLOBIN 13.0* 12.7* 13.2*   HEMATOCRIT 39.6* 40.3* 40.4*   MCV 97.8  97.6 96.7   MCH 32.1 30.8 31.6   MCHC 32.8* 31.5* 32.7*   RDW 53.1* 52.8* 51.5*   PLATELETCT 135* 138* 133*   MPV 10.5 9.7 9.6     Recent Labs     02/28/20  1211 02/29/20  0306 03/01/20  1110   SODIUM 141 140 136   POTASSIUM 4.4 4.2 4.5   CHLORIDE 108 109 103   CO2 26 20 26   GLUCOSE 107* 128* 130*   BUN 22 22 18   CREATININE 1.40 1.14 1.32   CALCIUM 8.8 8.7 9.4     Recent Labs     02/28/20  1211 02/29/20  0306 03/01/20  1110   INR 3.23* 2.94* 2.62*               Imaging  NM-CARDIAC STRESS TEST   Final Result      DX-CHEST-PORTABLE (1 VIEW)   Final Result         80           Assessment/Plan  Chest pain- (present on admission)  Assessment & Plan  Serial troponin trending down   Stress test ordered    Gout- (present on admission)  Assessment & Plan  Cont allopurinol    Paroxysmal atrial fibrillation (HCC)- (present on admission)  Assessment & Plan  Lopressor  coumadin    Scabies- (present on admission)  Assessment & Plan  Treated  Isolation, as per hospital policies    Type 2 diabetes mellitus without complication, without long-term current use of insulin (HCC)- (present on admission)  Assessment & Plan  Hold metfomin  Cont on ssi and accuchecks     Benign prostatic hyperplasia without lower urinary tract symptoms- (present on admission)  Assessment & Plan  flomax       VTE prophylaxis: warfarin

## 2020-03-01 NOTE — PROGRESS NOTES
Tele monitor notified RN pt had converted to a-fib, pt assessed without complaints, EKG ordered STAT, EKG partially paced with bigeminy pvcs , HR mid 100's,up to  120's sustained, will continue to monitor.

## 2020-03-02 ENCOUNTER — PATIENT OUTREACH (OUTPATIENT)
Dept: HEALTH INFORMATION MANAGEMENT | Facility: OTHER | Age: 65
End: 2020-03-02

## 2020-03-02 VITALS
OXYGEN SATURATION: 95 % | BODY MASS INDEX: 39.42 KG/M2 | DIASTOLIC BLOOD PRESSURE: 88 MMHG | RESPIRATION RATE: 20 BRPM | TEMPERATURE: 97.8 F | SYSTOLIC BLOOD PRESSURE: 146 MMHG | WEIGHT: 275.35 LBS | HEIGHT: 70 IN | HEART RATE: 55 BPM

## 2020-03-02 LAB
ANION GAP SERPL CALC-SCNC: 9 MMOL/L (ref 0–11.9)
BUN SERPL-MCNC: 20 MG/DL (ref 8–22)
CALCIUM SERPL-MCNC: 8.8 MG/DL (ref 8.5–10.5)
CHLORIDE SERPL-SCNC: 106 MMOL/L (ref 96–112)
CO2 SERPL-SCNC: 23 MMOL/L (ref 20–33)
CREAT SERPL-MCNC: 1.15 MG/DL (ref 0.5–1.4)
ERYTHROCYTE [DISTWIDTH] IN BLOOD BY AUTOMATED COUNT: 52.1 FL (ref 35.9–50)
GLUCOSE BLD-MCNC: 114 MG/DL (ref 65–99)
GLUCOSE SERPL-MCNC: 121 MG/DL (ref 65–99)
HCT VFR BLD AUTO: 39.4 % (ref 42–52)
HGB BLD-MCNC: 13 G/DL (ref 14–18)
INR PPP: 2.71 (ref 0.87–1.13)
MCH RBC QN AUTO: 32.1 PG (ref 27–33)
MCHC RBC AUTO-ENTMCNC: 33 G/DL (ref 33.7–35.3)
MCV RBC AUTO: 97.3 FL (ref 81.4–97.8)
PLATELET # BLD AUTO: 129 K/UL (ref 164–446)
PMV BLD AUTO: 10 FL (ref 9–12.9)
POTASSIUM SERPL-SCNC: 4 MMOL/L (ref 3.6–5.5)
PROTHROMBIN TIME: 29.7 SEC (ref 12–14.6)
RBC # BLD AUTO: 4.05 M/UL (ref 4.7–6.1)
SODIUM SERPL-SCNC: 138 MMOL/L (ref 135–145)
WBC # BLD AUTO: 5.8 K/UL (ref 4.8–10.8)

## 2020-03-02 PROCEDURE — A9270 NON-COVERED ITEM OR SERVICE: HCPCS | Performed by: INTERNAL MEDICINE

## 2020-03-02 PROCEDURE — A9270 NON-COVERED ITEM OR SERVICE: HCPCS | Performed by: HOSPITALIST

## 2020-03-02 PROCEDURE — 36415 COLL VENOUS BLD VENIPUNCTURE: CPT

## 2020-03-02 PROCEDURE — 82962 GLUCOSE BLOOD TEST: CPT

## 2020-03-02 PROCEDURE — 99239 HOSP IP/OBS DSCHRG MGMT >30: CPT | Performed by: INTERNAL MEDICINE

## 2020-03-02 PROCEDURE — 85610 PROTHROMBIN TIME: CPT

## 2020-03-02 PROCEDURE — 85027 COMPLETE CBC AUTOMATED: CPT

## 2020-03-02 PROCEDURE — 80048 BASIC METABOLIC PNL TOTAL CA: CPT

## 2020-03-02 PROCEDURE — 700102 HCHG RX REV CODE 250 W/ 637 OVERRIDE(OP): Performed by: INTERNAL MEDICINE

## 2020-03-02 PROCEDURE — 700102 HCHG RX REV CODE 250 W/ 637 OVERRIDE(OP): Performed by: HOSPITALIST

## 2020-03-02 RX ADMIN — METOPROLOL SUCCINATE 50 MG: 50 TABLET, EXTENDED RELEASE ORAL at 04:02

## 2020-03-02 RX ADMIN — DIPHENHYDRAMINE HYDROCHLORIDE 25 MG: 25 TABLET ORAL at 04:02

## 2020-03-02 RX ADMIN — ALLOPURINOL 100 MG: 100 TABLET ORAL at 04:02

## 2020-03-02 RX ADMIN — SENNOSIDES AND DOCUSATE SODIUM 2 TABLET: 8.6; 5 TABLET ORAL at 04:02

## 2020-03-02 RX ADMIN — TAMSULOSIN HYDROCHLORIDE 0.4 MG: 0.4 CAPSULE ORAL at 04:02

## 2020-03-02 ASSESSMENT — FIBROSIS 4 INDEX: FIB4 SCORE: 2.43

## 2020-03-02 NOTE — DISCHARGE SUMMARY
Discharge Summary    CHIEF COMPLAINT ON ADMISSION  Chief Complaint   Patient presents with   • Chest Pain   • Leg Swelling       Reason for Admission  EMS      Admission Date  2/28/2020    CODE STATUS  Prior    HPI & HOSPITAL COURSE   This is a 65 y/o M with PMHX of dm type 2, pacemaker, ckd , cad, afib, mitral valve repair comes to ED with complaint of chest pain. Pt admitted for further work up.  Pt troponin was trended and trended and trended down. Pt also had a stress test and was negative, his chest pain has resolved.  Pt also had scabies and was treated.  Pt will be discharged home today     The patient met 2-midnight criteria for an inpatient stay at the time of discharge.    Discharge Date  3/2/2020    FOLLOW UP ITEMS POST DISCHARGE  pcp    DISCHARGE DIAGNOSES  Active Problems:    Chest pain POA: Yes    Paroxysmal atrial fibrillation (HCC) POA: Yes    Gout POA: Yes    Scabies POA: Yes    Benign prostatic hyperplasia without lower urinary tract symptoms POA: Yes    Type 2 diabetes mellitus without complication, without long-term current use of insulin (HCC) POA: Yes  Resolved Problems:    * No resolved hospital problems. *      FOLLOW UP  Future Appointments   Date Time Provider Department Center   3/5/2020 11:00 AM [facility] EXAM 4 [facility] None   6/16/2020  1:00 PM VASCULAR NURSE PRACTITIONER [facility] None     [address] [address] [phone]    Per there office please schedule your hospital follow up with your primary care physician KRYSTINA English       MEDICATIONS ON DISCHARGE     Medication List      CONTINUE taking these medications      Instructions   allopurinol 100 MG Tabs  Commonly known as:  ZYLOPRIM   Take 1 Tab by mouth every day for 180 days.  Dose:  100 mg     metFORMIN 500 MG Tabs  Commonly known as:  GLUCOPHAGE   Take 500 mg by mouth 2 times a day, with meals.  Dose:  500 mg     metoprolol SR 50 MG Tb24  Commonly known as:  TOPROL XL    Doctor's comments:  This rx was submitted by a pharmacist working under a collaborative practice agreement.  Take 1 Tab by mouth every day. Needs OV for further fills or route to PCP  Dose:  50 mg     tamsulosin 0.4 MG capsule  Commonly known as:  FLOMAX   Take 0.4 mg by mouth every day.  Dose:  0.4 mg     * warfarin 5 MG Tabs  Commonly known as:  COUMADIN   Take 5 mg by mouth See Admin Instructions. 7 mg on Wednesday   5 mg all other days  Dose:  5 mg     * warfarin 2 MG Tabs  Commonly known as:  COUMADIN   Take 2 mg by mouth every day. 7 mg on Wednesday   5 mg all other days  Dose:  2 mg         * This list has 2 medication(s) that are the same as other medications prescribed for you. Read the directions carefully, and ask your doctor or other care provider to review them with you.                Allergies  No Known Allergies    DIET  No orders of the defined types were placed in this encounter.      ACTIVITY  As tolerated.  Weight bearing as tolerated    CONSULTATIONS  None     PROCEDURES  None     LABORATORY  Lab Results   Component Value Date    SODIUM 138 03/02/2020    POTASSIUM 4.0 03/02/2020    CHLORIDE 106 03/02/2020    CO2 23 03/02/2020    GLUCOSE 121 (H) 03/02/2020    BUN 20 03/02/2020    CREATININE 1.15 03/02/2020    CREATININE 1.4 04/27/2009        Lab Results   Component Value Date    WBC 5.8 03/02/2020    HEMOGLOBIN 13.0 (L) 03/02/2020    HEMATOCRIT 39.4 (L) 03/02/2020    PLATELETCT 129 (L) 03/02/2020        Total time of the discharge process exceeds 41 minutes.

## 2020-03-02 NOTE — DISCHARGE INSTRUCTIONS
Discharge Instructions    Discharged to home by taxi with escort. Discharged via wheelchair, hospital escort: Yes.  Special equipment needed: Not Applicable    Be sure to schedule a follow-up appointment with your primary care doctor or any specialists as instructed.     Discharge Plan:   Influenza Vaccine Indication: Not indicated: Previously immunized this influenza season and > 8 years of age    I understand that a diet low in cholesterol, fat, and sodium is recommended for good health. Unless I have been given specific instructions below for another diet, I accept this instruction as my diet prescription.   Other diet: Cardiac Diabetic      Special Instructions: None    · Is patient discharged on Warfarin / Coumadin?   Yes    You are receiving the drug warfarin. Please understand the importance of monitoring warfarin with scheduled PT/INR blood draws.  Follow-up with a call to your personal Doctor's office in 3 days to schedule a PT/INR. .    IMPORTANT: HOW TO USE THIS INFORMATION:  This is a summary and does NOT have all possible information about this product. This information does not assure that this product is safe, effective, or appropriate for you. This information is not individual medical advice and does not substitute for the advice of your health care professional. Always ask your health care professional for complete information about this product and your specific health needs.      WARFARIN - ORAL (WARF-uh-rin)      COMMON BRAND NAME(S): Coumadin      WARNING:  Warfarin can cause very serious (possibly fatal) bleeding. This is more likely to occur when you first start taking this medication or if you take too much warfarin. To decrease your risk for bleeding, your doctor or other health care provider will monitor you closely and check your lab results (INR test) to make sure you are not taking too much warfarin. Keep all medical and laboratory appointments. Tell your doctor right away if you  "notice any signs of serious bleeding. See also Side Effects section.      USES:  This medication is used to treat blood clots (such as in deep vein thrombosis-DVT or pulmonary embolus-PE) and/or to prevent new clots from forming in your body. Preventing harmful blood clots helps to reduce the risk of a stroke or heart attack. Conditions that increase your risk of developing blood clots include a certain type of irregular heart rhythm (atrial fibrillation), heart valve replacement, recent heart attack, and certain surgeries (such as hip/knee replacement). Warfarin is commonly called a \"blood thinner,\" but the more correct term is \"anticoagulant.\" It helps to keep blood flowing smoothly in your body by decreasing the amount of certain substances (clotting proteins) in your blood.      HOW TO USE:  Read the Medication Guide provided by your pharmacist before you start taking warfarin and each time you get a refill. If you have any questions, ask your doctor or pharmacist. Take this medication by mouth with or without food as directed by your doctor or other health care professional, usually once a day. It is very important to take it exactly as directed. Do not increase the dose, take it more frequently, or stop using it unless directed by your doctor. Dosage is based on your medical condition, laboratory tests (such as INR), and response to treatment. Your doctor or other health care provider will monitor you closely while you are taking this medication to determine the right dose for you. Use this medication regularly to get the most benefit from it. To help you remember, take it at the same time each day. It is important to eat a balanced, consistent diet while taking warfarin. Some foods can affect how warfarin works in your body and may affect your treatment and dose. Avoid sudden large increases or decreases in your intake of foods high in vitamin K (such as broccoli, cauliflower, cabbage, brussels sprouts, kale, " spinach, and other green leafy vegetables, liver, green tea, certain vitamin supplements). If you are trying to lose weight, check with your doctor before you try to go on a diet. Cranberry products may also affect how your warfarin works. Limit the amount of cranberry juice (16 ounces/480 milliliters a day) or other cranberry products you may drink or eat.      SIDE EFFECTS:  Nausea, loss of appetite, or stomach/abdominal pain may occur. If any of these effects persist or worsen, tell your doctor or pharmacist promptly. Remember that your doctor has prescribed this medication because he or she has judged that the benefit to you is greater than the risk of side effects. Many people using this medication do not have serious side effects. This medication can cause serious bleeding if it affects your blood clotting proteins too much (shown by unusually high INR lab results). Even if your doctor stops your medication, this risk of bleeding can continue for up to a week. Tell your doctor right away if you have any signs of serious bleeding, including: unusual pain/swelling/discomfort, unusual/easy bruising, prolonged bleeding from cuts or gums, persistent/frequent nosebleeds, unusually heavy/prolonged menstrual flow, pink/dark urine, coughing up blood, vomit that is bloody or looks like coffee grounds, severe headache, dizziness/fainting, unusual or persistent tiredness/weakness, bloody/black/tarry stools, chest pain, shortness of breath, difficulty swallowing. Tell your doctor right away if any of these unlikely but serious side effects occur: persistent nausea/vomiting, severe stomach/abdominal pain, yellowing eyes/skin. This drug rarely has caused very serious (possibly fatal) problems if its effects lead to small blood clots (usually at the beginning of treatment). This can lead to severe skin/tissue damage that may require surgery or amputation if left untreated. Patients with certain blood conditions (protein C or  S deficiency) may be at greater risk. Get medical help right away if any of these rare but serious side effects occur: painful/red/purplish patches on the skin (such as on the toe, breast, abdomen), change in the amount of urine, vision changes, confusion, slurred speech, weakness on one side of the body. A very serious allergic reaction to this drug is rare. However, get medical help right away if you notice any symptoms of a serious allergic reaction, including: rash, itching/swelling (especially of the face/tongue/throat), severe dizziness, trouble breathing. This is not a complete list of possible side effects. If you notice other effects not listed above, contact your doctor or pharmacist. In the [place] - Call your doctor for medical advice about side effects. You may report side effects to FDA at 7-542-DFY-6228. In [place] - Call your doctor for medical advice about side effects. You may report side effects to [facility] [place] at 1-219.297.6724.      PRECAUTIONS:  Before taking warfarin, tell your doctor or pharmacist if you are allergic to it; or if you have any other allergies. This product may contain inactive ingredients, which can cause allergic reactions or other problems. Talk to your pharmacist for more details. Before using this medication, tell your doctor or pharmacist your medical history, especially of: blood disorders (such as anemia, hemophilia), bleeding problems (such as bleeding of the stomach/intestines, bleeding in the brain), blood vessel disorders (such as aneurysms), recent major injury/surgery, liver disease, alcohol use, mental/mood disorders (including memory problems), frequent falls/injuries. It is important that all your doctors and dentists know that you take warfarin. Before having surgery or any medical/dental procedures, tell your doctor or dentist that you are taking this medication and about all the products you use (including prescription drugs, nonprescription drugs, and herbal  products). Avoid getting injections into the muscles. If you must have an injection into a muscle (for example, a flu shot), it should be given in the arm. This way, it will be easier to check for bleeding and/or apply pressure bandages. This medication may cause stomach bleeding. Daily use of alcohol while using this medicine will increase your risk for stomach bleeding and may also affect how this medication works. Limit or avoid alcoholic beverages. If you have not been eating well, if you have an illness or infection that causes fever, vomiting, or diarrhea for more than 2 days, or if you start using any antibiotic medications, contact your doctor or pharmacist immediately because these conditions can affect how warfarin works. This medication can cause heavy bleeding. To lower the chance of getting cut, bruised, or injured, use great caution with sharp objects like safety razors and nail cutters. Use an electric razor when shaving and a soft toothbrush when brushing your teeth. Avoid activities such as contact sports. If you fall or injure yourself, especially if you hit your head, call your doctor immediately. Your doctor may need to check you. The Food & Drug Administration has stated that generic warfarin products are interchangeable. However, consult your doctor or pharmacist before switching warfarin products. Be careful not to take more than one medication that contains warfarin unless specifically directed by the doctor or health care provider who is monitoring your warfarin treatment. Older adults may be at greater risk for bleeding while using this drug. This medication is not recommended for use during pregnancy because of serious (possibly fatal) harm to an unborn baby. Discuss the use of reliable forms of birth control with your doctor. If you become pregnant or think you may be pregnant, tell your doctor immediately. If you are planning pregnancy, discuss a plan for managing your condition with  "your doctor before you become pregnant. Your doctor may switch the type of medication you use during pregnancy. Very small amounts of this medication may pass into breast milk but is unlikely to harm a nursing infant. Consult your doctor before breast-feeding.      DRUG INTERACTIONS:  Drug interactions may change how your medications work or increase your risk for serious side effects. This document does not contain all possible drug interactions. Keep a list of all the products you use (including prescription/nonprescription drugs and herbal products) and share it with your doctor and pharmacist. Do not start, stop, or change the dosage of any medicines without your doctor's approval. Warfarin interacts with many prescription, nonprescription, vitamin, and herbal products. This includes medications that are applied to the skin or inside the vagina or rectum. The interactions with warfarin usually result in an increase or decrease in the \"blood-thinning\" (anticoagulant) effect. Your doctor or other health care professional should closely monitor you to prevent serious bleeding or clotting problems. While taking warfarin, it is very important to tell your doctor or pharmacist of any changes in medications, vitamins, or herbal products that you are taking. Some products that may interact with this drug include: capecitabine, imatinib, mifepristone. Aspirin, aspirin-like drugs (salicylates), and nonsteroidal anti-inflammatory drugs (NSAIDs such as ibuprofen, naproxen, celecoxib) may have effects similar to warfarin. These drugs may increase the risk of bleeding problems if taken during treatment with warfarin. Carefully check all prescription/nonprescription product labels (including drugs applied to the skin such as pain-relieving creams) since the products may contain NSAIDs or salicylates. Talk to your doctor about using a different medication (such as acetaminophen) to treat pain/fever. Low-dose aspirin and related " drugs (such as clopidogrel, ticlopidine) should be continued if prescribed by your doctor for specific medical reasons such as heart attack or stroke prevention. Consult your doctor or pharmacist for more details. Many herbal products interact with warfarin. Tell your doctor before taking any herbal products, especially bromelains, coenzyme Q10, cranberry, danshen, dong quai, fenugreek, garlic, ginkgo biloba, ginseng, and Thong's wort, among others. This medication may interfere with a certain laboratory test to measure theophylline levels, possibly causing false test results. Make sure laboratory personnel and all your doctors know you use this drug.      OVERDOSE:  If overdose is suspected, contact a poison control center or emergency room immediately. [place] residents can call the [place] National Poison Hotline at 1-714.838.5318. [place] residents can call a provincial poison control center. Symptoms of overdose may include: bloody/black/tarry stools, pink/dark urine, unusual/prolonged bleeding.      NOTES:  Do not share this medication with others. Laboratory and/or medical tests (such as INR, complete blood count) must be performed periodically to monitor your progress or check for side effects. Consult your doctor for more details.      MISSED DOSE:  For the best possible benefit, do not miss any doses. If you do miss a dose and remember on the same day, take it as soon as you remember. If you remember on the next day, skip the missed dose and resume your usual dosing schedule. Do not double the dose to catch up because this could increase your risk for bleeding. Keep a record of missed doses to give to your doctor or pharmacist. Contact your doctor or pharmacist if you miss 2 or more doses in a row.      STORAGE:  Store at room temperature away from light and moisture. Do not store in the bathroom. Keep all medications away from children and pets. Do not flush medications down the toilet or pour them into a drain  unless instructed to do so. Properly discard this product when it is  or no longer needed. Consult your pharmacist or local waste disposal company for more details about how to safely discard your product.      MEDICAL ALERT:  Your condition and medication can cause complications in a medical emergency. For information about enrolling in MedicAlert, call 1-143.102.2921 ([place]) or 1-926.300.8671 ([place]).      Information last revised 2010 Copyright(c) 2010 [employer]             Depression / Suicide Risk    As you are discharged from this [facility] facility, it is important to learn how to keep safe from harming yourself.    Recognize the warning signs:  · Abrupt changes in personality, positive or negative- including increase in energy   · Giving away possessions  · Change in eating patterns- significant weight changes-  positive or negative  · Change in sleeping patterns- unable to sleep or sleeping all the time   · Unwillingness or inability to communicate  · Depression  · Unusual sadness, discouragement and loneliness  · Talk of wanting to die  · Neglect of personal appearance   · Rebelliousness- reckless behavior  · Withdrawal from people/activities they love  · Confusion- inability to concentrate     If you or a loved one observes any of these behaviors or has concerns about self-harm, here's what you can do:  · Talk about it- your feelings and reasons for harming yourself  · Remove any means that you might use to hurt yourself (examples: pills, rope, extension cords, firearm)  · Get professional help from the community (Mental Health, Substance Abuse, psychological counseling)  · Do not be alone:Call your Safe Contact- someone whom you trust who will be there for you.  · Call your local CRISIS HOTLINE 989-6803 or 383-188-8594  · Call your local Children's Mobile Crisis Response Team [place] (676) 878-9968 or www.[employer]  · Call the toll free National Suicide Prevention  Hotlines   · National Suicide Prevention Lifeline 737-855-WFKY (9393)  · [facility] 800-SUICIDE (563-1265)    Atrial Fibrillation  Atrial fibrillation is a type of irregular or rapid heartbeat (arrhythmia). In atrial fibrillation, the heart quivers continuously in a chaotic pattern. This occurs when parts of the heart receive disorganized signals that make the heart unable to pump blood normally. This can increase the risk for stroke, heart failure, and other heart-related conditions. There are different types of atrial fibrillation, including:  · Paroxysmal atrial fibrillation. This type starts suddenly, and it usually stops on its own shortly after it starts.  · Persistent atrial fibrillation. This type often lasts longer than a week. It may stop on its own or with treatment.  · Long-lasting persistent atrial fibrillation. This type lasts longer than 12 months.  · Permanent atrial fibrillation. This type does not go away.  Talk with your health care provider to learn about the type of atrial fibrillation that you have.  What are the causes?  This condition is caused by some heart-related conditions or procedures, including:  · A heart attack.  · Coronary artery disease.  · Heart failure.  · Heart valve conditions.  · High blood pressure.  · Inflammation of the sac that surrounds the heart (pericarditis).  · Heart surgery.  · Certain heart rhythm disorders, such as Kerr-Parkinson-White syndrome.  Other causes include:  · Pneumonia.  · Obstructive sleep apnea.  · Blockage of an artery in the lungs (pulmonary embolism, or PE).  · Lung cancer.  · Chronic lung disease.  · Thyroid problems, especially if the thyroid is overactive (hyperthyroidism).  · Caffeine.  · Excessive alcohol use or illegal drug use.  · Use of some medicines, including certain decongestants and diet pills.  Sometimes, the cause cannot be found.  What increases the risk?  This condition is more likely to develop in:  · People who  are older in age.  · People who smoke.  · People who have diabetes mellitus.  · People who are overweight (obese).  · Athletes who exercise vigorously.  What are the signs or symptoms?  Symptoms of this condition include:  · A feeling that your heart is beating rapidly or irregularly.  · A feeling of discomfort or pain in your chest.  · Shortness of breath.  · Sudden light-headedness or weakness.  · Getting tired easily during exercise.  In some cases, there are no symptoms.  How is this diagnosed?  Your health care provider may be able to detect atrial fibrillation when taking your pulse. If detected, this condition may be diagnosed with:  · An electrocardiogram (ECG).  · A Holter monitor test that records your heartbeat patterns over a 24-hour period.  · Transthoracic echocardiogram (TTE) to evaluate how blood flows through your heart.  · Transesophageal echocardiogram (KD) to view more detailed images of your heart.  · A stress test.  · Imaging tests, such as a CT scan or chest X-ray.  · Blood tests.  How is this treated?  The main goals of treatment are to prevent blood clots from forming and to keep your heart beating at a normal rate and rhythm. The type of treatment that you receive depends on many factors, such as your underlying medical conditions and how you feel when you are experiencing atrial fibrillation.  This condition may be treated with:  · Medicine to slow down the heart rate, bring the heart’s rhythm back to normal, or prevent clots from forming.  · Electrical cardioversion. This is a procedure that resets your heart’s rhythm by delivering a controlled, low-energy shock to the heart through your skin.  · Different types of ablation, such as catheter ablation, catheter ablation with pacemaker, or surgical ablation. These procedures destroy the heart tissues that send abnormal signals. When the pacemaker is used, it is placed under your skin to help your heart beat in a regular rhythm.  Follow  these instructions at home:  · Take over-the counter and prescription medicines only as told by your health care provider.  · If your health care provider prescribed a blood-thinning medicine (anticoagulant), take it exactly as told. Taking too much blood-thinning medicine can cause bleeding. If you do not take enough blood-thinning medicine, you will not have the protection that you need against stroke and other problems.  · Do not use tobacco products, including cigarettes, chewing tobacco, and e-cigarettes. If you need help quitting, ask your health care provider.  · If you have obstructive sleep apnea, manage your condition as told by your health care provider.  · Do not drink alcohol.  · Do not drink beverages that contain caffeine, such as coffee, soda, and tea.  · Maintain a healthy weight. Do not use diet pills unless your health care provider approves. Diet pills may make heart problems worse.  · Follow diet instructions as told by your health care provider.  · Exercise regularly as told by your health care provider.  · Keep all follow-up visits as told by your health care provider. This is important.  How is this prevented?  · Avoid drinking beverages that contain caffeine or alcohol.  · Avoid certain medicines, especially medicines that are used for breathing problems.  · Avoid certain herbs and herbal medicines, such as those that contain ephedra or ginseng.  · Do not use illegal drugs, such as cocaine and amphetamines.  · Do not smoke.  · Manage your high blood pressure.  Contact a health care provider if:  · You notice a change in the rate, rhythm, or strength of your heartbeat.  · You are taking an anticoagulant and you notice increased bruising.  · You tire more easily when you exercise or exert yourself.  Get help right away if:  · You have chest pain, abdominal pain, sweating, or weakness.  · You feel nauseous.  · You notice blood in your vomit, bowel movement, or urine.  · You have shortness of  breath.  · You suddenly have swollen feet and ankles.  · You feel dizzy.  · You have sudden weakness or numbness of the face, arm, or leg, especially on one side of the body.  · You have trouble speaking, trouble understanding, or both (aphasia).  · Your face or your eyelid droops on one side.  These symptoms may represent a serious problem that is an emergency. Do not wait to see if the symptoms will go away. Get medical help right away. Call your local emergency services (911 in the [place] Do not drive yourself to the hospital.   This information is not intended to replace advice given to you by your health care provider. Make sure you discuss any questions you have with your health care provider.  Document Released: 12/18/2006 Document Revised: 04/26/2017 Document Reviewed: 04/13/2016  [employer] Patient Education © 2017 [employer]          Chest Pain Observation  It is often hard to give a specific diagnosis for the cause of chest pain. Among other possibilities your symptoms might be caused by inadequate oxygen delivery to your heart (angina). Angina that is not treated or evaluated can lead to a heart attack (myocardial infarction) or death.  Blood tests, electrocardiograms, and X-rays may have been done to help determine a possible cause of your chest pain. After evaluation and observation, your health care provider has determined that it is unlikely your pain was caused by an unstable condition that requires hospitalization. However, a full evaluation of your pain may need to be completed, with additional diagnostic testing as directed. It is very important to keep your follow-up appointments. Not keeping your follow-up appointments could result in permanent heart damage, disability, or death. If there is any problem keeping your follow-up appointments, you must call your health care provider.  HOME CARE INSTRUCTIONS   Due to the slight chance that your pain could be angina, it is important to  follow your health care provider's treatment plan and also maintain a healthy lifestyle:  · Maintain or work toward achieving a healthy weight.  · Stay physically active and exercise regularly.  · Decrease your salt intake.  · Eat a balanced, healthy diet. Talk to a dietitian to learn about heart-healthy foods.  · Increase your fiber intake by including whole grains, vegetables, fruits, and nuts in your diet.  · Avoid situations that cause stress, anger, or depression.  · Take medicines as advised by your health care provider. Report any side effects to your health care provider. Do not stop medicines or adjust the dosages on your own.  · Quit smoking. Do not use nicotine patches or gum until you check with your health care provider.  · Keep your blood pressure, blood sugar, and cholesterol levels within normal limits.  · Limit alcohol intake to no more than 1 drink per day for women who are not pregnant and 2 drinks per day for men.  · Do not abuse drugs.  SEEK IMMEDIATE MEDICAL CARE IF:  You have severe chest pain or pressure which may include symptoms such as:  · You feel pain or pressure in your arms, neck, jaw, or back.  · You have severe back or abdominal pain, feel sick to your stomach (nauseous), or throw up (vomit).  · You are sweating profusely.  · You are having a fast or irregular heartbeat.  · You feel short of breath while at rest.  · You notice increasing shortness of breath during rest, sleep, or with activity.  · You have chest pain that does not get better after rest or after taking your usual medicine.  · You wake from sleep with chest pain.  · You are unable to sleep because you cannot breathe.  · You develop a frequent cough or you are coughing up blood.  · You feel dizzy, faint, or experience extreme fatigue.  · You develop severe weakness, dizziness, fainting, or chills.  Any of these symptoms may represent a serious problem that is an emergency. Do not wait to see if the symptoms will go  away. Call your local emergency services (911 in the [place] Do not drive yourself to the hospital.  MAKE SURE YOU:  · Understand these instructions.  · Will watch your condition.  · Will get help right away if you are not doing well or get worse.     This information is not intended to replace advice given to you by your health care provider. Make sure you discuss any questions you have with your health care provider.     Document Released: 01/20/2012 Document Revised: 12/23/2014 Document Reviewed: 06/19/2014  [employer] Interactive Patient Education ©2016 [employer]        Scabies, Adult  Introduction  Scabies is a skin condition that happens when very small insects get under the skin (infestation). This causes a rash and severe itchiness. Scabies can spread from person to person (is contagious). If you get scabies, it is common for others in your household to get scabies too.  With proper treatment, symptoms usually go away in 2-4 weeks. Scabies usually does not cause lasting problems.  What are the causes?  This condition is caused by mites (Sarcoptes scabiei, or human itch mites) that can only be seen with a microscope. The mites get into the top layer of skin and lay eggs. Scabies can spread from person to person through:  · Close contact with a person who has scabies.  · Contact with infested items, such as towels, bedding, or clothing.  What increases the risk?  This condition is more likely to develop in:  · People who live in nursing homes and other extended-care facilities.  · People who have sexual contact with a partner who has scabies.  · Young children who attend  facilities.  · People who care for others who are at increased risk for scabies.  What are the signs or symptoms?  Symptoms of this condition may include:  · Severe itchiness. This is often worse at night.  · A rash that includes tiny red bumps or blisters. The rash commonly occurs on the wrist, elbow, armpit, fingers, waist,  groin, or buttocks. Bumps may form a line (burrow) in some areas.  · Skin irritation. This can include scaly patches or sores.  How is this diagnosed?  This condition is diagnosed with a physical exam. Your health care provider will look closely at your skin. In some cases, your health care provider may take a sample of your affected skin (skin scraping) and have it examined under a microscope.  How is this treated?  This condition may be treated with:  · Medicated cream or lotion that kills the mites. This is spread on the entire body and left on for several hours. Usually, one treatment with medicated cream or lotion is enough to kill all of the mites. In severe cases, the treatment may be repeated.  · Medicated cream that relieves itching.  · Medicines that help to relieve itching.  · Medicines that kill the mites. This treatment is rarely used.  Follow these instructions at home:     Medicines  · Take or apply over-the-counter and prescription medicines as told by your health care provider.  · Apply medicated cream or lotion as told by your health care provider.  · Do not wash off the medicated cream or lotion until the necessary amount of time has passed.  Skin Care  · Avoid scratching your affected skin.  · Keep your fingernails closely trimmed to reduce injury from scratching.  · Take cool baths or apply cool washcloths to help reduce itching.  General instructions  · Clean all items that you recently had contact with, including bedding, clothing, and furniture. Do this on the same day that your treatment starts.  ¨ Use hot water when you wash items.  ¨ Place unwashable items into closed, airtight plastic bags for at least 3 days. The mites cannot live for more than 3 days away from human skin.  ¨ Vacuum furniture and mattresses that you use.  · Make sure that other people who may have been infested are examined by a health care provider. These include members of your household and anyone who may have had  contact with infested items.  · Keep all follow-up visits as told by your health care provider. This is important.  Contact a health care provider if:  · You have itching that does not go away after 4 weeks of treatment.  · You continue to develop new bumps or burrows.  · You have redness, swelling, or pain in your rash area after treatment.  · You have fluid, blood, or pus coming from your rash.  This information is not intended to replace advice given to you by your health care provider. Make sure you discuss any questions you have with your health care provider.  Document Released: 09/07/2016 Document Revised: 05/25/2017 Document Reviewed: 07/19/2016  © 2017 [employer]    Follow up with your PCP and have Permethrin cream prescribed 8-10 days after initial treatment on 2/28.

## 2020-03-02 NOTE — DISCHARGE PLANNING
Patient to D/C today, no ride home, SW gave taxi voucher to RN for D/C.   
Per rounds, Pt is projected to discharge home tomorrow with no post acute needs from .   
yes

## 2020-03-02 NOTE — PROGRESS NOTES
Inpatient Anticoagulation Service Note    Date: 3/1/2020    Reason for Anticoagulation: Atrial Fibrillation, Deep Vein Thrombosis, Bioprosthetic Valve Replacement   Target INR: 2.5 to 3.5  [id number] VASc Score: 3  HAS-BLED Score: 0   Hemoglobin Value: (!) 13.2  Hematocrit Value: (!) 40.4  Lab Platelet Value: (!) 133    INR from last 7 days     Date/Time INR Value    20 1110  (!) 2.62    20 0306  (!) 2.94    20 1211  (!) 3.23        Dose from last 7 days     Date/Time Dose (mg)    20 1549  5    20 1530  5    20 2042  5        Bridge Therapy: No     Reversal Agent Administered: Not Applicable  Comments: Patient has Afib, DVT, and a bioprosthetic mitral valve. Home dosing of 7 mg Wednesday and 5 mg AOD has patient's INR at 2.62. Patient's goal INR listed at 2.5 to 3.5, although no clear reason for this goal level noted as patient does not have a mechanical mitral valve but instead bioprosthetic. Either way patient still therapeutic with both of those target ranges at 2.62 so home dosing continued. Copntinue to assess INR values, may be safe to continue to stay in 2.5 to 3.0 region. Patient has no signs/symptoms of bleeding noted and no new pertinent drug drug interactions noted.     Plan:  5 mg   Education Material Provided?: No  Pharmacist suggested discharge dosin mg Wednesday, 5 mg AOD with a close follow up within 48 hours warranted.      Davidson Hidalgo, PharmD

## 2020-03-02 NOTE — PROGRESS NOTES
Bedside report taken by day shift RN.  Assume care at 1900. Pt has no complaints of pain or discomfort. Bed locked in lowest position, non skid socks in place, bed alarm on and functioning, call light within reach. Will continue to monitor cognitive function and physiological changes. Hourly rounding in place.

## 2020-03-02 NOTE — CARE PLAN
Monitor Report:    Atrial Fibrillation  with Rare PVC's and couplets.    0.12/0.08/0.36      Patient having frequent Ventricular Undersensing on the monitor.  Had some undersensing occur yesterday but more frequent today.  Paged Dr. Gage and suggesting to call the rep to see patient.  Patient does not have pacer card and hence attempting to look up pacer information to call rep.  Paged rep for potential interrogation tonight.    Talked to Vanessa Youssef of [employer] on the phone.   Due to the fact that rates are , she believes strikes are artifact and pacer is not truly firing.  Also, believes this is the case due to narrow QRS of 0.08.  No interrogation needed per Vanessa.  Patient asymptomatic.      Pacemaker was placed 7/9/2014    [employer] Pacemaker     Model #: [id number]    Serial #: [id number]

## 2020-03-05 ENCOUNTER — ANTICOAGULATION VISIT (OUTPATIENT)
Dept: VASCULAR LAB | Facility: MEDICAL CENTER | Age: 65
End: 2020-03-05
Attending: FAMILY MEDICINE
Payer: MEDICARE

## 2020-03-05 VITALS — HEART RATE: 83 BPM | SYSTOLIC BLOOD PRESSURE: 98 MMHG | DIASTOLIC BLOOD PRESSURE: 76 MMHG

## 2020-03-05 DIAGNOSIS — I82.4Y9 DEEP VEIN THROMBOSIS (DVT) OF PROXIMAL LOWER EXTREMITY, UNSPECIFIED CHRONICITY, UNSPECIFIED LATERALITY (HCC): ICD-10-CM

## 2020-03-05 DIAGNOSIS — Z95.2 H/O MITRAL VALVE REPLACEMENT: ICD-10-CM

## 2020-03-05 DIAGNOSIS — I48.0 PAROXYSMAL ATRIAL FIBRILLATION (HCC): ICD-10-CM

## 2020-03-05 LAB — INR PPP: 2.9 (ref 2–3.5)

## 2020-03-05 PROCEDURE — 99211 OFF/OP EST MAY X REQ PHY/QHP: CPT | Performed by: NURSE PRACTITIONER

## 2020-03-05 PROCEDURE — 85610 PROTHROMBIN TIME: CPT

## 2020-03-05 NOTE — PROGRESS NOTES
Anticoagulation Summary  As of 3/5/2020    INR goal:   2.5-3.5   TTR:   75.5 % (6.1 mo)   INR used for dosin.90 (3/5/2020)   Warfarin maintenance plan:   7 mg (5 mg x 1 and 2 mg x 1) every Wed; 5 mg (5 mg x 1) all other days   Weekly warfarin total:   37 mg   Plan last modified:   WILLIAM BobPPASQUALE (10/16/2019)   Next INR check:   2020   Target end date:   Indefinite    Indications    H/O mitral valve replacement [Z95.2]  Paroxysmal atrial fibrillation (HCC) [I48.0]  Deep vein thrombosis (HCC) [I82.409] [I82.409]             Anticoagulation Episode Summary     INR check location:       Preferred lab:       Send INR reminders to:       Comments:         Anticoagulation Care Providers     Provider Role Specialty Phone number    Sumanth Yancey M.D. Referring Cardiology [phone]    [facility] Responsible  [phone]        Anticoagulation Patient Findings      HPI:  Morales Olivier seen in clinic today for follow up on anticoagulation therapy in the presence of MVR, AF, DVT hx.   Hospitalized over the weekend with chest pain. Stress test was neg. Noted to have scabies for which he was treated.   Denies any medication or diet changes.   No current symptoms of bleeding or thrombosis reported.    A/P:   INR therapeutic.   Continue current regimen.   BP recorded in vitals.    Follow up appointment in 4 week(s).    Next Appointment:  at 11:00 am.    Yelena LOYA

## 2020-03-09 LAB — INR BLD: 2.9 (ref 0.9–1.2)

## 2020-03-11 ENCOUNTER — APPOINTMENT (OUTPATIENT)
Dept: RADIOLOGY | Facility: MEDICAL CENTER | Age: 65
End: 2020-03-11
Attending: EMERGENCY MEDICINE
Payer: MEDICARE

## 2020-03-11 ENCOUNTER — HOSPITAL ENCOUNTER (EMERGENCY)
Facility: MEDICAL CENTER | Age: 65
End: 2020-03-11
Attending: EMERGENCY MEDICINE
Payer: MEDICARE

## 2020-03-11 VITALS
OXYGEN SATURATION: 93 % | WEIGHT: 284.83 LBS | BODY MASS INDEX: 40.78 KG/M2 | RESPIRATION RATE: 20 BRPM | DIASTOLIC BLOOD PRESSURE: 88 MMHG | TEMPERATURE: 97.4 F | SYSTOLIC BLOOD PRESSURE: 126 MMHG | HEIGHT: 70 IN | HEART RATE: 85 BPM

## 2020-03-11 DIAGNOSIS — N41.9 PROSTATITIS, UNSPECIFIED PROSTATITIS TYPE: ICD-10-CM

## 2020-03-11 DIAGNOSIS — R10.30 LOWER ABDOMINAL PAIN: ICD-10-CM

## 2020-03-11 DIAGNOSIS — R31.9 HEMATURIA, UNSPECIFIED TYPE: ICD-10-CM

## 2020-03-11 LAB
ALBUMIN SERPL BCP-MCNC: 4 G/DL (ref 3.2–4.9)
ALBUMIN/GLOB SERPL: 1.5 G/DL
ALP SERPL-CCNC: 61 U/L (ref 30–99)
ALT SERPL-CCNC: 11 U/L (ref 2–50)
ANION GAP SERPL CALC-SCNC: 10 MMOL/L (ref 0–11.9)
APPEARANCE UR: ABNORMAL
AST SERPL-CCNC: 19 U/L (ref 12–45)
BACTERIA #/AREA URNS HPF: NEGATIVE /HPF
BASOPHILS # BLD AUTO: 0.4 % (ref 0–1.8)
BASOPHILS # BLD: 0.04 K/UL (ref 0–0.12)
BILIRUB SERPL-MCNC: 0.8 MG/DL (ref 0.1–1.5)
BILIRUB UR QL STRIP.AUTO: NEGATIVE
BUN SERPL-MCNC: 23 MG/DL (ref 8–22)
CALCIUM SERPL-MCNC: 9.6 MG/DL (ref 8.5–10.5)
CHLORIDE SERPL-SCNC: 106 MMOL/L (ref 96–112)
CO2 SERPL-SCNC: 23 MMOL/L (ref 20–33)
COLOR UR: ABNORMAL
CREAT SERPL-MCNC: 1.33 MG/DL (ref 0.5–1.4)
EOSINOPHIL # BLD AUTO: 0.09 K/UL (ref 0–0.51)
EOSINOPHIL NFR BLD: 1 % (ref 0–6.9)
EPI CELLS #/AREA URNS HPF: NEGATIVE /HPF
ERYTHROCYTE [DISTWIDTH] IN BLOOD BY AUTOMATED COUNT: 53.4 FL (ref 35.9–50)
GLOBULIN SER CALC-MCNC: 2.7 G/DL (ref 1.9–3.5)
GLUCOSE SERPL-MCNC: 121 MG/DL (ref 65–99)
GLUCOSE UR STRIP.AUTO-MCNC: NEGATIVE MG/DL
HCT VFR BLD AUTO: 43 % (ref 42–52)
HGB BLD-MCNC: 13.9 G/DL (ref 14–18)
HYALINE CASTS #/AREA URNS LPF: ABNORMAL /LPF
IMM GRANULOCYTES # BLD AUTO: 0.04 K/UL (ref 0–0.11)
IMM GRANULOCYTES NFR BLD AUTO: 0.4 % (ref 0–0.9)
INR PPP: 3.27 (ref 0.87–1.13)
KETONES UR STRIP.AUTO-MCNC: NEGATIVE MG/DL
LEUKOCYTE ESTERASE UR QL STRIP.AUTO: NEGATIVE
LIPASE SERPL-CCNC: 17 U/L (ref 11–82)
LYMPHOCYTES # BLD AUTO: 0.6 K/UL (ref 1–4.8)
LYMPHOCYTES NFR BLD: 6.6 % (ref 22–41)
MCH RBC QN AUTO: 31.3 PG (ref 27–33)
MCHC RBC AUTO-ENTMCNC: 32.3 G/DL (ref 33.7–35.3)
MCV RBC AUTO: 96.8 FL (ref 81.4–97.8)
MICRO URNS: ABNORMAL
MONOCYTES # BLD AUTO: 0.6 K/UL (ref 0–0.85)
MONOCYTES NFR BLD AUTO: 6.6 % (ref 0–13.4)
NEUTROPHILS # BLD AUTO: 7.66 K/UL (ref 1.82–7.42)
NEUTROPHILS NFR BLD: 85 % (ref 44–72)
NITRITE UR QL STRIP.AUTO: NEGATIVE
NRBC # BLD AUTO: 0 K/UL
NRBC BLD-RTO: 0 /100 WBC
PH UR STRIP.AUTO: 5.5 [PH] (ref 5–8)
PLATELET # BLD AUTO: 171 K/UL (ref 164–446)
PMV BLD AUTO: 10.3 FL (ref 9–12.9)
POTASSIUM SERPL-SCNC: 4.4 MMOL/L (ref 3.6–5.5)
PROT SERPL-MCNC: 6.7 G/DL (ref 6–8.2)
PROT UR QL STRIP: 100 MG/DL
PROTHROMBIN TIME: 34.6 SEC (ref 12–14.6)
RBC # BLD AUTO: 4.44 M/UL (ref 4.7–6.1)
RBC # URNS HPF: >150 /HPF
RBC UR QL AUTO: ABNORMAL
SODIUM SERPL-SCNC: 139 MMOL/L (ref 135–145)
SP GR UR STRIP.AUTO: 1.02
UROBILINOGEN UR STRIP.AUTO-MCNC: 0.2 MG/DL
WBC # BLD AUTO: 9 K/UL (ref 4.8–10.8)
WBC #/AREA URNS HPF: ABNORMAL /HPF

## 2020-03-11 PROCEDURE — 83690 ASSAY OF LIPASE: CPT

## 2020-03-11 PROCEDURE — 51798 US URINE CAPACITY MEASURE: CPT

## 2020-03-11 PROCEDURE — 85610 PROTHROMBIN TIME: CPT

## 2020-03-11 PROCEDURE — 99285 EMERGENCY DEPT VISIT HI MDM: CPT

## 2020-03-11 PROCEDURE — 81001 URINALYSIS AUTO W/SCOPE: CPT

## 2020-03-11 PROCEDURE — 700111 HCHG RX REV CODE 636 W/ 250 OVERRIDE (IP): Performed by: EMERGENCY MEDICINE

## 2020-03-11 PROCEDURE — 700117 HCHG RX CONTRAST REV CODE 255: Performed by: EMERGENCY MEDICINE

## 2020-03-11 PROCEDURE — 700105 HCHG RX REV CODE 258: Performed by: EMERGENCY MEDICINE

## 2020-03-11 PROCEDURE — 85025 COMPLETE CBC W/AUTO DIFF WBC: CPT

## 2020-03-11 PROCEDURE — 96376 TX/PRO/DX INJ SAME DRUG ADON: CPT

## 2020-03-11 PROCEDURE — 74177 CT ABD & PELVIS W/CONTRAST: CPT

## 2020-03-11 PROCEDURE — 96374 THER/PROPH/DIAG INJ IV PUSH: CPT

## 2020-03-11 PROCEDURE — 96375 TX/PRO/DX INJ NEW DRUG ADDON: CPT

## 2020-03-11 PROCEDURE — 80053 COMPREHEN METABOLIC PANEL: CPT

## 2020-03-11 RX ORDER — ONDANSETRON 2 MG/ML
4 INJECTION INTRAMUSCULAR; INTRAVENOUS ONCE
Status: COMPLETED | OUTPATIENT
Start: 2020-03-11 | End: 2020-03-11

## 2020-03-11 RX ORDER — HYDROCODONE BITARTRATE AND ACETAMINOPHEN 5; 325 MG/1; MG/1
1 TABLET ORAL EVERY 4 HOURS PRN
Qty: 16 TAB | Refills: 0 | Status: SHIPPED | OUTPATIENT
Start: 2020-03-11 | End: 2020-03-16

## 2020-03-11 RX ORDER — MORPHINE SULFATE 4 MG/ML
4 INJECTION, SOLUTION INTRAMUSCULAR; INTRAVENOUS ONCE
Status: COMPLETED | OUTPATIENT
Start: 2020-03-11 | End: 2020-03-11

## 2020-03-11 RX ORDER — TAMSULOSIN HYDROCHLORIDE 0.4 MG/1
0.4 CAPSULE ORAL DAILY
Qty: 30 CAP | Refills: 0 | Status: SHIPPED | OUTPATIENT
Start: 2020-03-11 | End: 2020-04-02

## 2020-03-11 RX ORDER — ALBUTEROL SULFATE 90 UG/1
2 AEROSOL, METERED RESPIRATORY (INHALATION) EVERY 6 HOURS PRN
COMMUNITY
End: 2020-01-01

## 2020-03-11 RX ORDER — SODIUM CHLORIDE 9 MG/ML
1000 INJECTION, SOLUTION INTRAVENOUS ONCE
Status: COMPLETED | OUTPATIENT
Start: 2020-03-11 | End: 2020-03-11

## 2020-03-11 RX ORDER — SULFAMETHOXAZOLE AND TRIMETHOPRIM 800; 160 MG/1; MG/1
1 TABLET ORAL 2 TIMES DAILY
Qty: 28 TAB | Refills: 0 | Status: SHIPPED | OUTPATIENT
Start: 2020-03-11 | End: 2020-04-02

## 2020-03-11 RX ORDER — METOPROLOL SUCCINATE 50 MG/1
50 TABLET, EXTENDED RELEASE ORAL EVERY MORNING
Status: ON HOLD | COMMUNITY
End: 2021-01-01 | Stop reason: SDUPTHER

## 2020-03-11 RX ORDER — ALLOPURINOL 100 MG/1
100 TABLET ORAL EVERY MORNING
Status: ON HOLD | COMMUNITY
End: 2021-01-01

## 2020-03-11 RX ADMIN — MORPHINE SULFATE 4 MG: 4 INJECTION INTRAVENOUS at 14:43

## 2020-03-11 RX ADMIN — ONDANSETRON 4 MG: 2 INJECTION INTRAMUSCULAR; INTRAVENOUS at 12:34

## 2020-03-11 RX ADMIN — SODIUM CHLORIDE 1000 ML: 9 INJECTION, SOLUTION INTRAVENOUS at 12:33

## 2020-03-11 RX ADMIN — SODIUM CHLORIDE 1000 ML: 9 INJECTION, SOLUTION INTRAVENOUS at 13:22

## 2020-03-11 RX ADMIN — IOHEXOL 100 ML: 350 INJECTION, SOLUTION INTRAVENOUS at 12:25

## 2020-03-11 RX ADMIN — MORPHINE SULFATE 4 MG: 4 INJECTION INTRAVENOUS at 12:34

## 2020-03-11 ASSESSMENT — FIBROSIS 4 INDEX: FIB4 SCORE: 2.43

## 2020-03-11 NOTE — ED TRIAGE NOTES
Morales Olivier  Chief Complaint   Patient presents with   • Flank Pain     right side, started this am   • Unable to Urinate     started this am,  states hematuria yesterday     Pt biba, to triage in wheelchair with above complaint.   VSS,  No acute distress,  Appears uncomfortable.  States was able to urinate small amount this am.    Pt states ran out of flomax 4 days ago,  Hx of kidney stones.    Charge RN notified of pt,  returned to lobby, educated on triage process, and to inform staff of any changes or concerns.

## 2020-03-11 NOTE — ED NOTES
Pharmacy Medication Reconciliation      Medication reconciliation updated and complete per pt at bedside  Allergies have been verified  No oral ABX within the last 14 days  [facility]    Pt reports he was just discharged from [facility] on 02/28/2020             Pt notified. Please order FIT test

## 2020-03-11 NOTE — ED PROVIDER NOTES
ED Provider  Scribed for Adam Recinos D.O. by Cesar Carrillo. 3/11/2020  11:09 AM    Means of arrival: EMS  History obtained from:Patient  History limited by: None    CHIEF COMPLAINT  Chief Complaint   Patient presents with   • Flank Pain     right side, started this am   • Unable to Urinate     started this am,  states hematuria yesterday       HPI  Morales Olivier is a 64 y.o. male with a history of prostate disorder who presents for urinary retention onset today. Patient reports multiple episodes of hematuria yesterday. He also endorses nausea, abdominal pain and distension, and intermittent right flank pain. He denies any diarrhea, cough, congestion or fevers. Patient has a history of diabetes, hypertension, and dyslipidemia.    REVIEW OF SYSTEMS  See HPI for further details. All other systems are negative.     PAST MEDICAL HISTORY   has a past medical history of Atrial fibrillation (HCC), Bronchitis, CAD (coronary artery disease), Gout, Heart valve disease, Hypertension, Open wound (2009), Pacemaker, Personal history of venous thrombosis and embolism (), PVC (premature ventricular contraction) (2019), Renal disorder, and Type II or unspecified type diabetes mellitus without mention of complication, not stated as uncontrolled.    SOCIAL HISTORY  Social History     Tobacco Use   • Smoking status: Former Smoker     Packs/day: 0.00     Last attempt to quit: 1981     Years since quittin.2   • Smokeless tobacco: Never Used   Substance and Sexual Activity   • Alcohol use: No   • Drug use: No   • Sexual activity: Not on file       SURGICAL HISTORY   has a past surgical history that includes mitral valve replace (3/14/08); maze procedure (3/14/08); angiogram (7/3/2009); angiogram (2009); cath removal (2009); thrombectomy (2009); embolectomy (2009); irrigation & debridement general (2009); wide excision (2009); other cardiac surgery (); other abdominal  "surgery; mitral valve replace (3/8/2017); and lopez (3/8/2017).    CURRENT MEDICATIONS  Home Medications     Reviewed by Richie Beckwith (Pharmacy Tech) on 03/11/20 at 1300  Med List Status: Complete   Medication Last Dose Status   albuterol 108 (90 Base) MCG/ACT Aero Soln inhalation aerosol 3/11/2020 Active   allopurinol (ZYLOPRIM) 100 MG Tab 3/11/2020 Active   metFORMIN (GLUCOPHAGE) 500 MG Tab 3/11/2020 Active   metoprolol SR (TOPROL XL) 25 MG TABLET SR 24 HR 3/11/2020 Active   tamsulosin (FLOMAX) 0.4 MG capsule 3/7/2020 Active   warfarin (COUMADIN) 2 MG Tab 3/4/2020 Active   warfarin (COUMADIN) 5 MG Tab 3/10/2020 Active                ALLERGIES  No Known Allergies    PHYSICAL EXAM  VITAL SIGNS: /104   Pulse (!) 58   Temp 36.3 °C (97.4 °F) (Temporal)   Resp 18   Ht 1.778 m (5' 10\")   Wt (!) 129.2 kg (284 lb 13.4 oz)   SpO2 95%   BMI 40.87 kg/m²   Constitutional: Alert in no apparent distress.  HENT: No signs of trauma, dry mucous membranes  Eyes: Conjunctiva normal, Non-icteric.   Neck: Normal range of motion, No tenderness, Supple.  Lymphatic: No lymphadenopathy noted.   Cardiovascular: Regular rate and rhythm, no murmurs.   Thorax & Lungs: Normal breath sounds, No respiratory distress, No wheezing, No chest tenderness.   Abdomen: Bowel sounds normal, distended, mild tenderness to lower abdomen, No pulsatile masses. No peritoneal signs.  Skin: Warm, Dry, normal color.   Back: No flank tenderness  Extremities: No edema, No tenderness, No cyanosis  Musculoskeletal: Good range of motion in all major joints. No tenderness to palpation or major deformities noted.   Neurologic: Alert and oriented x4, Normal motor function, Normal sensory function, No focal deficits noted.   Psychiatric: Affect normal, Judgment normal, Mood normal.       MEDICAL DECISION MAKING  This is a 64 y.o. male who presents complaints of difficulty urinating, blood in the urine, and pelvic and abdominal pain.  There is concerns for " urinary retention but a bladder scan showed a very small amount of urine so CT was done CT showed no obstructive, or infectious cause for his pain.  His white blood cell count is normal.  And his CT did not show a distended bladder either.  He was given IV fluids and he has been able to urinate.  He is urine does show significant amount of blood.  This may be prostatitis versus cystitis.  Cultures are pending but he is placed on antibiotics in the meantime.  His INR is therapeutic at 3.57.  And he will be discharged home with urology follow-up.  There is no signs of sepsis,    DIAGNOSTIC STUDIES / PROCEDURES    LABS  Results for orders placed or performed during the hospital encounter of 03/11/20   CBC WITH DIFFERENTIAL   Result Value Ref Range    WBC 9.0 4.8 - 10.8 K/uL    RBC 4.44 (L) 4.70 - 6.10 M/uL    Hemoglobin 13.9 (L) 14.0 - 18.0 g/dL    Hematocrit 43.0 42.0 - 52.0 %    MCV 96.8 81.4 - 97.8 fL    MCH 31.3 27.0 - 33.0 pg    MCHC 32.3 (L) 33.7 - 35.3 g/dL    RDW 53.4 (H) 35.9 - 50.0 fL    Platelet Count 171 164 - 446 K/uL    MPV 10.3 9.0 - 12.9 fL    Neutrophils-Polys 85.00 (H) 44.00 - 72.00 %    Lymphocytes 6.60 (L) 22.00 - 41.00 %    Monocytes 6.60 0.00 - 13.40 %    Eosinophils 1.00 0.00 - 6.90 %    Basophils 0.40 0.00 - 1.80 %    Immature Granulocytes 0.40 0.00 - 0.90 %    Nucleated RBC 0.00 /100 WBC    Neutrophils (Absolute) 7.66 (H) 1.82 - 7.42 K/uL    Lymphs (Absolute) 0.60 (L) 1.00 - 4.80 K/uL    Monos (Absolute) 0.60 0.00 - 0.85 K/uL    Eos (Absolute) 0.09 0.00 - 0.51 K/uL    Baso (Absolute) 0.04 0.00 - 0.12 K/uL    Immature Granulocytes (abs) 0.04 0.00 - 0.11 K/uL    NRBC (Absolute) 0.00 K/uL   COMP METABOLIC PANEL   Result Value Ref Range    Sodium 139 135 - 145 mmol/L    Potassium 4.4 3.6 - 5.5 mmol/L    Chloride 106 96 - 112 mmol/L    Co2 23 20 - 33 mmol/L    Anion Gap 10.0 0.0 - 11.9    Glucose 121 (H) 65 - 99 mg/dL    Bun 23 (H) 8 - 22 mg/dL    Creatinine 1.33 0.50 - 1.40 mg/dL    Calcium 9.6 8.5  - 10.5 mg/dL    AST(SGOT) 19 12 - 45 U/L    ALT(SGPT) 11 2 - 50 U/L    Alkaline Phosphatase 61 30 - 99 U/L    Total Bilirubin 0.8 0.1 - 1.5 mg/dL    Albumin 4.0 3.2 - 4.9 g/dL    Total Protein 6.7 6.0 - 8.2 g/dL    Globulin 2.7 1.9 - 3.5 g/dL    A-G Ratio 1.5 g/dL   LIPASE   Result Value Ref Range    Lipase 17 11 - 82 U/L   URINALYSIS CULTURE, IF INDICATED   Result Value Ref Range    Color Brown     Character Cloudy (A)     Specific Gravity 1.020 <1.035    Ph 5.5 5.0 - 8.0    Glucose Negative Negative mg/dL    Ketones Negative Negative mg/dL    Protein 100 (A) Negative mg/dL    Bilirubin Negative Negative    Urobilinogen, Urine 0.2 Negative    Nitrite Negative Negative    Leukocyte Esterase Negative Negative    Occult Blood Large (A) Negative    Micro Urine Req Microscopic    ESTIMATED GFR   Result Value Ref Range    GFR If African American >60 >60 mL/min/1.73 m 2    GFR If Non African American 54 (A) >60 mL/min/1.73 m 2   URINE MICROSCOPIC (W/UA)   Result Value Ref Range    WBC 2-5 (A) /hpf    RBC >150 (A) /hpf    Bacteria Negative None /hpf    Epithelial Cells Negative /hpf    Hyaline Cast 0-2 /lpf   PT/INR   Result Value Ref Range    PT 34.6 (H) 12.0 - 14.6 sec    INR 3.27 (H) 0.87 - 1.13      All labs reviewed by me.    RADIOLOGY  CT-ABDOMEN-PELVIS WITH   Final Result      1.  Liver surface appears nodular consistent with cirrhosis.      2.  There is splenomegaly.      3.  Mild amount of free fluid is noted in the abdomen and pelvis.      4.  Small ventral abdominal wall hernias are identified containing abdominal fat.      5.  Mild subcutaneous edema is also noted.      6.  Trace right pleural effusion.           The radiologist's interpretations of all radiological studies have been reviewed by me.        COURSE  Pertinent Labs & Imaging studies reviewed. (See chart for details)    11:09 AM - Patient seen and examined at bedside. Pt with extensive history including prostate disorder presents with urinary  retention, hematuria yesterday. His vitals are stable here. On exam he has abdominal distension with lower abdominal tenderness. No flank tenderness. He additionally has dry mucous membranes concerning for dehydration. Discussed plan of care. Ordered for UA, CBC, CMP, lipase, UA to evaluate his symptoms. Will review lab and then likely place grover catheter to relieve pain.    11:42 AM  Bladder scan shows only 20cc, will order CT abdomen pelvis. Pain is treated with morphine. Patient given zofran and IV fluids for signs of dehydration.    2:18 PM  Reviewed current labs and imaging as above. Urine positive for blood. Ordered PTINR for evaluation. Evaluated patient. His pain has improved.     4:09 PM  Informed patient of all results. He is being prescribed bactrim and flomax for his prostatitis and urinary retention. He will also be given norco to control his pain. Patient is to follow up with his urologist, and he understands and agrees to the discharge plan of care.      HYDRATION: Based on the patient's presentation of Dehydration the patient was given IV fluids. IV Hydration was used because oral hydration was not adequate alone. Upon recheck following hydration, the patient was feeling improved and discharged home.       In prescribing controlled substances to this patient, I certify that I have obtained and reviewed the medical history of Morales Olivier. I have also made a good kusum effort to obtain applicable records from other providers who have treated the patient and records did not demonstrate any increased risk of substance abuse that would prevent me from prescribing controlled substances.     I have conducted a physical exam and documented it. I have reviewed Mr. Olivier’s prescription history as maintained by the [place] Prescription Monitoring Program.     I have assessed the patient’s risk for abuse, dependency, and addiction using the validated Opioid Risk Tool available at  https://www.igobubble.com/zaexkr-milf-tbis-ort-narcotic-abuse.     Given the above, I believe the benefits of controlled substance therapy outweigh the risks. The reasons for prescribing controlled substances include non-narcotic, oral analgesic alternatives have been inadequate for pain control. Accordingly, I have discussed the risk and benefits, treatment plan, and alternative therapies with the patient.       FINAL IMPRESSION  1. Hematuria, unspecified type    2. Prostatitis, unspecified prostatitis type    3. Lower abdominal pain        PRESCRIPTIONS  New Prescriptions    HYDROCODONE-ACETAMINOPHEN (NORCO) 5-325 MG TAB PER TABLET    Take 1 Tab by mouth every four hours as needed for up to 5 days.    SULFAMETHOXAZOLE-TRIMETHOPRIM (BACTRIM DS) 800-160 MG TABLET    Take 1 Tab by mouth 2 times a day.    TAMSULOSIN (FLOMAX) 0.4 MG CAPSULE    Take 1 Cap by mouth every day.       FOLLOW UP  [facility]  [id number] [facility] [id number]  [phone]          -DISCHARGE-     I, Cesar Carrillo (Scribe), am scribing for, and in the presence of, Adam Recinos D.O..    Electronically signed by: Cesar Carrillo (Trisha), 3/11/2020    IAdam D.O. personally performed the services described in this documentation, as scribed by Cesar Carrillo in my presence, and it is both accurate and complete. C.    The note accurately reflects work and decisions made by me.  Adam Recinos D.O.  3/11/2020  5:00 PM

## 2020-03-11 NOTE — ED NOTES
Attempted multiple times to call for medicaid taxi, [facility] medicaid sts pt is not a member. Provided pt with phone number to call himself a medicaid taxi.

## 2020-03-11 NOTE — DISCHARGE INSTRUCTIONS
You have blood in your urine this is concerning for possible prostatitis or an infection of the bladder.  Urine placed on an antibiotic to treat this.  You are also being placed on Flomax to help improve your urine flow.  You are also being prescribed a narcotic pain medication.  Do not take this if you are driving.    The symptoms will need to be evaluated by urologist.  Please call the urologist listed above to make a follow-up appointment.

## 2020-03-22 NOTE — PROGRESS NOTES
Pt arrived to T720 with ACLS RN. Tele box on, monitor room notified, pt paced. No reports of chest pain or SOB. Pt steady on feet to bed as a SBA. POC discussed with pt, labs and chart reviewed. All needs met at this time. Contact precautions in place. Call light and belongings within reach. Pt educated on calling for assistance and verbalized understanding.    Allergy;

## 2020-04-02 ENCOUNTER — ANTICOAGULATION VISIT (OUTPATIENT)
Dept: VASCULAR LAB | Facility: MEDICAL CENTER | Age: 65
End: 2020-04-02
Attending: INTERNAL MEDICINE
Payer: MEDICARE

## 2020-04-02 VITALS
WEIGHT: 262 LBS | HEART RATE: 81 BPM | DIASTOLIC BLOOD PRESSURE: 81 MMHG | BODY MASS INDEX: 37.59 KG/M2 | SYSTOLIC BLOOD PRESSURE: 127 MMHG

## 2020-04-02 DIAGNOSIS — Z95.2 H/O MITRAL VALVE REPLACEMENT: ICD-10-CM

## 2020-04-02 DIAGNOSIS — I82.4Y9 DEEP VEIN THROMBOSIS (DVT) OF PROXIMAL LOWER EXTREMITY, UNSPECIFIED CHRONICITY, UNSPECIFIED LATERALITY (HCC): ICD-10-CM

## 2020-04-02 DIAGNOSIS — I48.0 PAROXYSMAL ATRIAL FIBRILLATION (HCC): ICD-10-CM

## 2020-04-02 LAB
INR BLD: 4.2 (ref 0.9–1.2)
INR PPP: 4.2 (ref 2–3.5)

## 2020-04-02 PROCEDURE — 99212 OFFICE O/P EST SF 10 MIN: CPT | Performed by: NURSE PRACTITIONER

## 2020-04-02 PROCEDURE — 85610 PROTHROMBIN TIME: CPT

## 2020-04-02 RX ORDER — WARFARIN SODIUM 5 MG/1
TABLET ORAL
Qty: 90 TAB | Refills: 0 | Status: SHIPPED | OUTPATIENT
Start: 2020-04-02 | End: 2020-06-26 | Stop reason: SDUPTHER

## 2020-04-02 RX ORDER — TAMSULOSIN HYDROCHLORIDE 0.4 MG/1
0.4 CAPSULE ORAL DAILY
Qty: 90 CAP | Refills: 0 | Status: SHIPPED | OUTPATIENT
Start: 2020-04-02 | End: 2020-06-22

## 2020-04-02 RX ORDER — WARFARIN SODIUM 2 MG/1
2 TABLET ORAL DAILY
COMMUNITY
End: 2020-06-26 | Stop reason: SDUPTHER

## 2020-04-02 ASSESSMENT — FIBROSIS 4 INDEX: FIB4 SCORE: 2.18

## 2020-04-02 NOTE — PROGRESS NOTES
Anticoagulation Summary  As of 2020    INR goal:   2.5-3.5   TTR:   71.1 % (7.1 mo)   INR used for dosin.20! (2020)   Warfarin maintenance plan:   7 mg (5 mg x 1 and 2 mg x 1) every Wed; 5 mg (5 mg x 1) all other days   Weekly warfarin total:   37 mg   Plan last modified:   Yelena Carreon AHasmukhPPASQUALE (10/16/2019)   Next INR check:   2020   Target end date:   Indefinite    Indications    H/O mitral valve replacement [Z95.2]  Paroxysmal atrial fibrillation (HCC) [I48.0]  Deep vein thrombosis (HCC) [I82.409] [I82.409]             Anticoagulation Episode Summary     INR check location:       Preferred lab:       Send INR reminders to:       Comments:         Anticoagulation Care Providers     Provider Role Specialty Phone number    Sumanth Yancey M.D. Referring Cardiology [phone]    [facility] Responsible  [phone]        Anticoagulation Patient Findings      HPI:  Morales Olivier seen in clinic today for follow up on anticoagulation therapy in the presence of MVR, AF, DVT hx.   He just finished a 14 day course of bactrim.  Denies any diet changes.   No current symptoms of bleeding or thrombosis reported.    A/P:   INR supratherapeutic.   HOLD one dose then continue current regimen.   BP recorded in vitals.    Follow up appointment in 4 week(s) per pt's request. Reminded him to call for any medication changes in the future.    Next Appointment:  at 11:00 am.    Yelena LOYA

## 2020-04-25 NOTE — PROGRESS NOTES
"CC: re-eval of mikhail \"do not sleep well\"    HPI:  Mr. Morales Olivier is a 65 y/o disabled M kindly referred by Dr. Yelena TORRES. Had a PMA psg 9/15 which showed an ahi of 38/donna sat 81% with many central events. Cpap was effective. The patient declined therapy and was referred to ENT at his request. He doesn't recall going.Had been diagnosed in [facility] in 2004 and declined cpap then as well.    The patient briefly describes his sleep problem as \"wake up all during the night not rested central.\"  Symptoms have been present for 50 years.  This affects his life and daily activities by rendering him sleepy during the day.  He rates the severity as 8 out of 10.  He indicates that he has tried CPAP in 2004 but could not sleep with it.    He does not have a regular bed partner.  His sleep latency varies from minutes to hours.  He may watch TV just prior to turn out the lights and attempting to go to sleep.  He experiences 3-4 nocturnal awakenings and reports 3-4 nocturia episodes.    Symptoms include waking up too early in the morning and being unable to return to sleep, sometimes difficulty awakening and getting out of bed after sleeping, napping or returning to bed after arising, sleepiness during the day, tiredness during the day, and he may fall asleep accidentally.  He denies snoring, restless legs, or any parasomnia symptoms.  He does report disturbing dreams.    He reports falling asleep accidentally when watching TV.  The patient sleep diary shows he awakened feeling tired each and every morning but never napped.  His total Gregory score is a normal 6 out of 24.      Significant co-morbidities and modifying factors include PAF, gout, scabies, bph, dm2, former smoker, ppm, copd, htn, mitral valve repair, kidney stones, and obesity.        Patient Active Problem List    Diagnosis Date Noted   • Chest pain 02/24/2018     Priority: High   • Gout 12/19/2019     Priority: Medium   • Dehydration 10/22/2019     " Priority: Medium   • Right calf pain 04/13/2019     Priority: Medium   • Paroxysmal atrial fibrillation (HCC) 09/02/2009     Priority: Medium   • Cellulitis 12/19/2019     Priority: Low   • Type 2 diabetes mellitus without complication, without long-term current use of insulin ([facility]) 10/22/2019     Priority: Low   • ESTELA (obstructive sleep apnea) 09/25/2019     Priority: Low   • Benign prostatic hyperplasia without lower urinary tract symptoms 08/29/2019     Priority: Low   • Mixed hyperlipidemia 08/29/2019     Priority: Low   • Type 2 diabetes mellitus with complication, without long-term current use of insulin ([facility]) 08/29/2019     Priority: Low   • Obesity (BMI 30-39.9) 08/29/2019     Priority: Low   • Deep vein thrombosis ([facility]) [I82.409] 07/25/2019     Priority: Low   • PVC (premature ventricular contraction) 04/13/2019     Priority: Low   • Essential hypertension, benign 04/19/2017     Priority: Low   • Acquired circulating anticoagulants ([facility]) 01/13/2015     Priority: Low   • H/O mitral valve replacement 01/13/2015     Priority: Low   • Scabies 02/29/2020   • Osteomyelitis ([facility]) 12/20/2019   • Chest x-ray abnormality 12/19/2019   • Right renal stone 12/16/2019   • Hypertriglyceridemia 08/29/2019   • COPD exacerbation ([facility]) 11/22/2016   • Cardiac pacemaker in situ 07/24/2014   • Chronic gout of multiple sites 09/02/2009       Past Medical History:   Diagnosis Date   • Atrial fibrillation ([facility])    • Back pain    • Bronchitis    • CAD (coronary artery disease)    • COPD (chronic obstructive pulmonary disease) ([facility])    • Gout    • Heart valve disease     mitral valve replacement (bovine)   • Hypertension    • Kidney stone    • Obesity    • Open wound 9/2/2009   • Pacemaker    • Personal history of venous thrombosis and embolism 2009    DVT right leg   • PVC (premature ventricular contraction) 4/13/2019   • Renal disorder     kidney stones   • Type II or unspecified type diabetes mellitus without mention of  complication, not stated as uncontrolled     DM type II- diet controlled        Past Surgical History:   Procedure Laterality Date   • MITRAL VALVE REPLACE  3/8/2017    Procedure: MITRAL VALVE REPLACE-REDO;  Surgeon: Cornelia Wright M.D.;  Location: SURGERY [facility];  Service:    • KD  3/8/2017    Procedure: KD;  Surgeon: Cornelia Wright M.D.;  Location: SURGERY [facility];  Service:    • IRRIGATION & DEBRIDEMENT GENERAL  7/20/2009    Performed by MICHEL URBINA at SURGERY [facility]   • WIDE EXCISION  7/20/2009    Performed by MICHEL URBINA at SURGERY [facility]   • ANGIOGRAM  7/4/2009    Performed by MICHEL URBINA at SURGERY [facility]   • CATH REMOVAL  7/4/2009    Performed by MICHEL URBINA at [facility]   • THROMBECTOMY  7/4/2009    Performed by MICHEL URBINA at SURGERY [facility]   • EMBOLECTOMY  7/4/2009    Performed by MICHEL URBINA at SURGERY [facility]   • ANGIOGRAM  7/3/2009    Performed by MORGAN WARD at SURGERY [facility]   • MITRAL VALVE REPLACE  3/14/08    Performed by CORNELIA WRIGHT at SURGERY [facility]   • MAZE PROCEDURE  3/14/08    Performed by CORNELIA WRIGHT at [facility]   • OTHER CARDIAC SURGERY  2008    mitral valve replacement   • AORTIC VALVE REPLACEMENT     • OTHER ABDOMINAL SURGERY      imbulica repair        Family History   Problem Relation Age of Onset   • Diabetes Mother    • Lung Disease Father    • Heart Disease Neg Hx        Social History     Socioeconomic History   • Marital status: Single     Spouse name: Not on file   • Number of children: Not on file   • Years of education: Not on file   • Highest education level: Not on file   Occupational History   • Not on file   Social Needs   • Financial resource strain: Not on file   • Food insecurity     Worry: Not on file     Inability: Not on file   • Transportation needs     Medical: Not on file     Non-medical: Not on file  "  Tobacco Use   • Smoking status: Former Smoker     Packs/day: 2.50     Years: 9.00     Pack years: 22.50     Types: Cigarettes     Last attempt to quit: 1981     Years since quittin.3   • Smokeless tobacco: Never Used   Substance and Sexual Activity   • Alcohol use: No   • Drug use: Yes     Types: Marijuana   • Sexual activity: Not on file   Lifestyle   • Physical activity     Days per week: Not on file     Minutes per session: Not on file   • Stress: Not on file   Relationships   • Social connections     Talks on phone: Not on file     Gets together: Not on file     Attends Mu-ism service: Not on file     Active member of club or organization: Not on file     Attends meetings of clubs or organizations: Not on file     Relationship status: Not on file   • Intimate partner violence     Fear of current or ex partner: Not on file     Emotionally abused: Not on file     Physically abused: Not on file     Forced sexual activity: Not on file   Other Topics Concern   • Not on file   Social History Narrative   • Not on file       Current Outpatient Medications   Medication Sig Dispense Refill   • warfarin (COUMADIN) 2 MG Tab Take 2 mg by mouth every day.     • warfarin (COUMADIN) 5 MG Tab Take 1 tab by mouth daily or as directed by anticoagulation clinic 90 Tab 0   • tamsulosin (FLOMAX) 0.4 MG capsule Take 1 Cap by mouth every day. 90 Cap 0   • allopurinol (ZYLOPRIM) 100 MG Tab Take 100 mg by mouth every morning.     • metoprolol SR (TOPROL XL) 25 MG TABLET SR 24 HR Take 50 mg by mouth every morning.     • albuterol 108 (90 Base) MCG/ACT Aero Soln inhalation aerosol Inhale 2 Puffs by mouth every 6 hours as needed for Shortness of Breath.     • metFORMIN (GLUCOPHAGE) 500 MG Tab Take 500 mg by mouth 2 times a day, with meals.       No current facility-administered medications for this visit.     \"CURRENT RX\"    ALLERGIES: Patient has no known allergies.    ROS    Constitutional: Denies fever, chills, sweats,  " "weight loss, fatigue.  Eyes: Denies vision loss, pain, drainage, double vision, glasses.  Ears/Nose/Mouth/Throat: Denies earache, difficulty hearing, rhinitis/nasal congestion, injury, recurrent sore throat, persistent hoarseness, decayed teeth/toothaches, ringing or buzzing in the ears.  Cardiovascular: Denies chest pain, tightness, palpitations, swelling in legs/feet, fainting, difficulty breathing when lying down but gets better when sitting up.   Respiratory: Denies shortness of breath, cough, sputum, wheezing, painful breathing, coughing up blood.   Sleep: per HPI  Gastrointestinal: Denies  difficulty swallowing, nausea, abdominal pain, diarrhea, constipation, heartburn.  Genitourinary: Positive for blood in the urine  Musculoskeletal: Positive for painful joints secondary to gout and back pain  Integumentary: Denies rashes, lumps, color changes.   Neurological: Denies frequent headaches,weakness, dizziness.    PHYSICAL EXAM  obese    /70 (BP Location: Left arm, Patient Position: Sitting, BP Cuff Size: Adult)   Pulse 80   Resp 14   Ht 1.778 m (5' 10\")   Wt 124.3 kg (274 lb)   SpO2 95%   BMI 39.31 kg/m²   Appearance: Well-nourished, well-developed, no acute distress  Eyes:  PERRLA, EOMI  ENMT: without lesions, deformities;hearing grossly intact; tongue normal, posterior pharynx without erythema or exudate;   Modified Mallampati (AKA Chase) Score: mask on 2/2 COVID  Neck: Supple, trachea midline, no masses  Respiratory effort:  No intercostal retractions or use of accessory muscles  Lung auscultation:  No wheezes rhonchi rubs or rales  Cardiac: No murmurs, rubs, or gallops; regular rhythm, normal rate; trace edema  Abdomen:  No tenderness, no organomegaly. Obese  Musculoskeletal:  Grossly normal; gait and station normal; digits and nails normal  Skin:  No rashes, petechiae, cyanosis  Neurologic: without focal signs; oriented to person, time, place, and purpose; judgement intact  Psychiatric:  No " depression, anxiety, agitation        PROBLEMS:  1. ESTELA (obstructive sleep apnea)    - Overnight Home Sleep Study; Future    2. Obesity (BMI 30-39.9)    - OBESITY COUNSELING (No Charge): Patient identified as having weight management issue.  Appropriate orders and counseling given.    3. Paroxysmal atrial fibrillation (HCC)      4. Type 2 diabetes mellitus with complication, without long-term current use of insulin (HCC)      5. Type 2 diabetes mellitus without complication, without long-term current use of insulin (HCC)      6. Mixed hyperlipidemia      7. H/O mitral valve replacement      8. Essential hypertension, benign      9. Cardiac pacemaker in situ      10. Other insomnia        PLAN:   The patient has signs and symptoms consistent with obstructive sleep apnea hypopnea syndrome. Will schedule him to have a home sleep apnea test.  Will return after the results are available to determine further diagnostic needs and/or treatment options.    The risks of untreated sleep apnea were discussed with the patient at length. Patients with ESTELA are at increased risk of cardiovascular disease including coronary artery disease, systemic arterial hypertension, pulmonary arterial hypertension, cardiac arrythmias, and stroke. ESTELA patients have an increased risk of motor vehicle accidents, type 2 diabetes, chronic kidney disease, and non-alcoholic liver disease. The patient was advised to avoid driving a motor vehicle when drowsy.    Positive airway pressure, such as CPAP, is considered first-line and preferred therapy for sleep apnea and may reverse both symptoms and risks.    Have advised the patient to follow up with the appropriate healthcare practitioners for all other medical problems and issues.      Return for after sleep study.

## 2020-04-27 ENCOUNTER — SLEEP CENTER VISIT (OUTPATIENT)
Dept: SLEEP MEDICINE | Facility: MEDICAL CENTER | Age: 65
End: 2020-04-27
Payer: MEDICARE

## 2020-04-27 VITALS
WEIGHT: 274 LBS | HEIGHT: 70 IN | RESPIRATION RATE: 14 BRPM | SYSTOLIC BLOOD PRESSURE: 122 MMHG | DIASTOLIC BLOOD PRESSURE: 70 MMHG | HEART RATE: 80 BPM | BODY MASS INDEX: 39.22 KG/M2 | OXYGEN SATURATION: 95 %

## 2020-04-27 DIAGNOSIS — G47.09 OTHER INSOMNIA: ICD-10-CM

## 2020-04-27 DIAGNOSIS — E66.9 OBESITY (BMI 30-39.9): ICD-10-CM

## 2020-04-27 DIAGNOSIS — E78.2 MIXED HYPERLIPIDEMIA: ICD-10-CM

## 2020-04-27 DIAGNOSIS — Z95.2 H/O MITRAL VALVE REPLACEMENT: ICD-10-CM

## 2020-04-27 DIAGNOSIS — Z95.0 CARDIAC PACEMAKER IN SITU: ICD-10-CM

## 2020-04-27 DIAGNOSIS — I10 ESSENTIAL HYPERTENSION, BENIGN: ICD-10-CM

## 2020-04-27 DIAGNOSIS — I48.0 PAROXYSMAL ATRIAL FIBRILLATION (HCC): ICD-10-CM

## 2020-04-27 DIAGNOSIS — E11.8 TYPE 2 DIABETES MELLITUS WITH COMPLICATION, WITHOUT LONG-TERM CURRENT USE OF INSULIN (HCC): ICD-10-CM

## 2020-04-27 DIAGNOSIS — G47.33 OSA (OBSTRUCTIVE SLEEP APNEA): ICD-10-CM

## 2020-04-27 DIAGNOSIS — E11.9 TYPE 2 DIABETES MELLITUS WITHOUT COMPLICATION, WITHOUT LONG-TERM CURRENT USE OF INSULIN (HCC): ICD-10-CM

## 2020-04-27 PROCEDURE — 99204 OFFICE O/P NEW MOD 45 MIN: CPT | Performed by: INTERNAL MEDICINE

## 2020-04-27 ASSESSMENT — FIBROSIS 4 INDEX: FIB4 SCORE: 2.18

## 2020-04-30 ENCOUNTER — ANTICOAGULATION VISIT (OUTPATIENT)
Dept: VASCULAR LAB | Facility: MEDICAL CENTER | Age: 65
End: 2020-04-30
Attending: INTERNAL MEDICINE
Payer: MEDICARE

## 2020-04-30 DIAGNOSIS — I82.4Y9 DEEP VEIN THROMBOSIS (DVT) OF PROXIMAL LOWER EXTREMITY, UNSPECIFIED CHRONICITY, UNSPECIFIED LATERALITY (HCC): ICD-10-CM

## 2020-04-30 DIAGNOSIS — Z95.2 H/O MITRAL VALVE REPLACEMENT: ICD-10-CM

## 2020-04-30 DIAGNOSIS — I48.0 PAROXYSMAL ATRIAL FIBRILLATION (HCC): ICD-10-CM

## 2020-04-30 LAB — INR PPP: 4.2 (ref 2–3.5)

## 2020-04-30 PROCEDURE — 85610 PROTHROMBIN TIME: CPT

## 2020-04-30 PROCEDURE — 99212 OFFICE O/P EST SF 10 MIN: CPT | Performed by: NURSE PRACTITIONER

## 2020-04-30 NOTE — PROGRESS NOTES
Anticoagulation Summary  As of 2020    INR goal:   2.5-3.5   TTR:   62.8 % (8 mo)   INR used for dosin.20! (2020)   Warfarin maintenance plan:   5 mg (5 mg x 1) every day   Weekly warfarin total:   35 mg   Plan last modified:   SHARMILA Bob (2020)   Next INR check:   2020   Target end date:   Indefinite    Indications    H/O mitral valve replacement [Z95.2]  Paroxysmal atrial fibrillation (HCC) [I48.0]  Deep vein thrombosis (HCC) [I82.409] [I82.409]             Anticoagulation Episode Summary     INR check location:       Preferred lab:       Send INR reminders to:       Comments:         Anticoagulation Care Providers     Provider Role Specialty Phone number    Sumanth Yancey M.D. Referring Cardiology [phone]    [facility] Responsible  [phone]        Anticoagulation Patient Findings      HPI:  Morales Olivier seen in clinic today for follow up on anticoagulation therapy in the presence of DVT, MVR.   Denies any changes to current medical/health status since last appointment.   Eating less greens and will try to stay consistent. Denies any medication changes.   No current symptoms of bleeding or thrombosis reported.    A/P:   INR supratherapeutic.   Will decrease regimen.    Follow up appointment in 4 week(s) per pt's request.    Next Appointment: Thursday, May 28, 2020 at 11:00 am.    Yelena LOYA

## 2020-05-28 ENCOUNTER — ANTICOAGULATION VISIT (OUTPATIENT)
Dept: VASCULAR LAB | Facility: MEDICAL CENTER | Age: 65
End: 2020-05-28
Attending: INTERNAL MEDICINE
Payer: MEDICARE

## 2020-05-28 DIAGNOSIS — I82.4Y9 DEEP VEIN THROMBOSIS (DVT) OF PROXIMAL LOWER EXTREMITY, UNSPECIFIED CHRONICITY, UNSPECIFIED LATERALITY (HCC): ICD-10-CM

## 2020-05-28 DIAGNOSIS — I48.0 PAROXYSMAL ATRIAL FIBRILLATION (HCC): ICD-10-CM

## 2020-05-28 DIAGNOSIS — Z95.2 H/O MITRAL VALVE REPLACEMENT: ICD-10-CM

## 2020-05-28 LAB — INR PPP: 5.2 (ref 2–3.5)

## 2020-05-28 PROCEDURE — 85610 PROTHROMBIN TIME: CPT

## 2020-05-28 PROCEDURE — 99212 OFFICE O/P EST SF 10 MIN: CPT | Performed by: NURSE PRACTITIONER

## 2020-05-28 NOTE — PROGRESS NOTES
Anticoagulation Summary  As of 2020    INR goal:   2.5-3.5   TTR:   56.3 % (8.9 mo)   INR used for dosin.20! (2020)   Warfarin maintenance plan:   2 mg (2 mg x 1) every Fri; 5 mg (5 mg x 1) all other days   Weekly warfarin total:   32 mg   Plan last modified:   SHARMILA Bob (2020)   Next INR check:   2020   Target end date:   Indefinite    Indications    H/O mitral valve replacement [Z95.2]  Paroxysmal atrial fibrillation (HCC) [I48.0]  Deep vein thrombosis (HCC) [I82.409] [I82.409]             Anticoagulation Episode Summary     INR check location:       Preferred lab:       Send INR reminders to:       Comments:         Anticoagulation Care Providers     Provider Role Specialty Phone number    Sumanth Yancey M.D. Referring Cardiology [phone]    [facility] Responsible  [phone]        Anticoagulation Patient Findings      HPI:  Morales Olivier seen in clinic today for follow up on anticoagulation therapy in the presence of DVT, MVR, AF.   Denies any changes to current medical/health status since last appointment.   Appetite poor. Denies any medication changes.   No current symptoms of bleeding or thrombosis reported.    A/P:   INR supratherapeutic.   Hold one dose then began reduced regimen.     Follow up appointment in 4 week(s) per pt's preference. Cautioned about avoiding falls and to monitor for bleeding.    Next Appointment: 2020 at 10:45 am.    Yelena LOYA

## 2020-06-22 DIAGNOSIS — Z95.2 H/O MITRAL VALVE REPLACEMENT: ICD-10-CM

## 2020-06-22 RX ORDER — TAMSULOSIN HYDROCHLORIDE 0.4 MG/1
CAPSULE ORAL
Qty: 30 CAP | Refills: 0 | Status: SHIPPED | OUTPATIENT
Start: 2020-06-22

## 2020-06-26 ENCOUNTER — ANTICOAGULATION VISIT (OUTPATIENT)
Dept: VASCULAR LAB | Facility: MEDICAL CENTER | Age: 65
End: 2020-06-26
Attending: INTERNAL MEDICINE
Payer: MEDICARE

## 2020-06-26 DIAGNOSIS — Z95.2 H/O MITRAL VALVE REPLACEMENT: ICD-10-CM

## 2020-06-26 DIAGNOSIS — I48.0 PAROXYSMAL ATRIAL FIBRILLATION (HCC): ICD-10-CM

## 2020-06-26 DIAGNOSIS — I82.4Y9 DEEP VEIN THROMBOSIS (DVT) OF PROXIMAL LOWER EXTREMITY, UNSPECIFIED CHRONICITY, UNSPECIFIED LATERALITY (HCC): ICD-10-CM

## 2020-06-26 LAB — INR PPP: 3.2 (ref 2–3.5)

## 2020-06-26 PROCEDURE — 99211 OFF/OP EST MAY X REQ PHY/QHP: CPT | Performed by: PHARMACIST

## 2020-06-26 PROCEDURE — 85610 PROTHROMBIN TIME: CPT

## 2020-06-26 RX ORDER — WARFARIN SODIUM 2 MG/1
2 TABLET ORAL DAILY
Qty: 30 TAB | Refills: 1 | Status: ON HOLD | OUTPATIENT
Start: 2020-06-26 | End: 2020-01-01 | Stop reason: SDUPTHER

## 2020-06-26 RX ORDER — WARFARIN SODIUM 5 MG/1
TABLET ORAL
Qty: 90 TAB | Refills: 1 | Status: SHIPPED | OUTPATIENT
Start: 2020-06-26 | End: 2020-08-27 | Stop reason: SDUPTHER

## 2020-06-26 RX ORDER — TAMSULOSIN HYDROCHLORIDE 0.4 MG/1
CAPSULE ORAL
Qty: 90 CAP | Refills: 1 | OUTPATIENT
Start: 2020-06-26

## 2020-06-26 NOTE — PROGRESS NOTES
Anticoagulation Summary  As of 6/26/2020    INR goal:   2.5-3.5   TTR:   52.2 % (9.9 mo)   INR used for dosing:   3.20 (6/26/2020)   Warfarin maintenance plan:   2 mg (2 mg x 1) every Fri; 5 mg (5 mg x 1) all other days   Weekly warfarin total:   32 mg   Plan last modified:   SHARMILA Bob (5/28/2020)   Next INR check:   7/24/2020   Priority:   Acute   Target end date:   Indefinite    Indications    H/O mitral valve replacement [Z95.2]  Paroxysmal atrial fibrillation (HCC) [I48.0]  Deep vein thrombosis (HCC) [I82.409] [I82.409]             Anticoagulation Episode Summary     INR check location:       Preferred lab:       Send INR reminders to:       Comments:         Anticoagulation Care Providers     Provider Role Specialty Phone number    Sumanth Yancey M.D. Referring Cardiology [phone]    [facility] Responsible  [phone]                Anticoagulation Patient Findings      HPI:  Morales Olivier seen in clinic today, on anticoagulation therapy with warfarin for MVR  Changes to current medical/health status since last appt: denies  Denies signs/symptoms of bleeding and/or thrombosis since the last appt.    Denies any interval changes to diet  Denies any interval changes to medications since last appt.   Denies any complications or cost restrictions with current therapy.   Verified current warfarin dosing schedule.       A/P   INR  is-therapeutic.   Will have pt continue with the same warfarin dosing.     Follow up appointment in 4 week(s).    Denise Brown, PharmD

## 2020-07-24 ENCOUNTER — ANTICOAGULATION VISIT (OUTPATIENT)
Dept: VASCULAR LAB | Facility: MEDICAL CENTER | Age: 65
End: 2020-07-24
Attending: PSYCHIATRY & NEUROLOGY
Payer: MEDICARE

## 2020-07-24 VITALS — WEIGHT: 260 LBS | BODY MASS INDEX: 37.31 KG/M2

## 2020-07-24 DIAGNOSIS — Z95.2 H/O MITRAL VALVE REPLACEMENT: ICD-10-CM

## 2020-07-24 DIAGNOSIS — I82.4Y9 DEEP VEIN THROMBOSIS (DVT) OF PROXIMAL LOWER EXTREMITY, UNSPECIFIED CHRONICITY, UNSPECIFIED LATERALITY (HCC): ICD-10-CM

## 2020-07-24 DIAGNOSIS — I48.0 PAROXYSMAL ATRIAL FIBRILLATION (HCC): ICD-10-CM

## 2020-07-24 LAB — INR PPP: 5.2 (ref 2–3.5)

## 2020-07-24 PROCEDURE — 85610 PROTHROMBIN TIME: CPT

## 2020-07-24 PROCEDURE — 99212 OFFICE O/P EST SF 10 MIN: CPT

## 2020-07-24 ASSESSMENT — FIBROSIS 4 INDEX: FIB4 SCORE: 2.211083193570266566

## 2020-07-24 NOTE — PROGRESS NOTES
Anticoagulation Summary  As of 2020    INR goal:   2.5-3.5   TTR:   49.0 % (10.8 mo)   INR used for dosin.20! (2020)   Warfarin maintenance plan:   2 mg (2 mg x 1) every Fri; 5 mg (5 mg x 1) all other days   Weekly warfarin total:   32 mg   Plan last modified:   SHARMILA Bob (2020)   Next INR check:   2020   Priority:   Acute   Target end date:   Indefinite    Indications    H/O mitral valve replacement [Z95.2]  Paroxysmal atrial fibrillation (HCC) [I48.0]  Deep vein thrombosis (HCC) [I82.409] [I82.409]             Anticoagulation Episode Summary     INR check location:       Preferred lab:       Send INR reminders to:       Comments:         Anticoagulation Care Providers     Provider Role Specialty Phone number    Sumanth Yancey M.D. Referring Cardiology [phone]    [facility] Responsible  [phone]        Anticoagulation Patient Findings          History of Present Illness: follow up appointment for chronic anticoagulation with the high risk medication, warfarin for MVR/Atrial fibrillation/DVT    Last INR was at goal, pt I snow Supra therapeutic today.  Will HOLD for 2 days, then resume current dosing regimen. Follow up in 2 weeks, to reduce the risk of adverse events related to this high risk medication, warfarin.    Brandi Howard, Clinical Pharmacist

## 2020-07-29 DIAGNOSIS — Z79.01 CHRONIC ANTICOAGULATION: ICD-10-CM

## 2020-08-04 NOTE — PROGRESS NOTES
Bedside report received at 0700. POC discussed with pt; Pt denies dizziness, ambulating to and from restroom without assistance;Pt c/o pain in right foot due to gout; Medicated per MAR; heat ordered; No overnight cardiac events; all questions answered at this time.       Normal for race

## 2020-08-06 ENCOUNTER — ANTICOAGULATION VISIT (OUTPATIENT)
Dept: VASCULAR LAB | Facility: MEDICAL CENTER | Age: 65
End: 2020-08-06
Attending: INTERNAL MEDICINE
Payer: MEDICARE

## 2020-08-06 VITALS — SYSTOLIC BLOOD PRESSURE: 111 MMHG | DIASTOLIC BLOOD PRESSURE: 65 MMHG | HEART RATE: 53 BPM

## 2020-08-06 DIAGNOSIS — Z95.2 H/O MITRAL VALVE REPLACEMENT: ICD-10-CM

## 2020-08-06 DIAGNOSIS — I48.0 PAROXYSMAL ATRIAL FIBRILLATION (HCC): ICD-10-CM

## 2020-08-06 LAB
INR BLD: 2.5 (ref 0.9–1.2)
INR PPP: 2.5 (ref 2–3.5)

## 2020-08-06 PROCEDURE — 85610 PROTHROMBIN TIME: CPT

## 2020-08-06 PROCEDURE — 99211 OFF/OP EST MAY X REQ PHY/QHP: CPT

## 2020-08-06 NOTE — PROGRESS NOTES
Anticoagulation Summary  As of 2020    INR goal:  2.5-3.5   TTR:  48.6 % (11.3 mo)   INR used for dosin.50 (2020)   Warfarin maintenance plan:  2 mg (2 mg x 1) every Fri; 5 mg (5 mg x 1) all other days   Weekly warfarin total:  32 mg   Plan last modified:  SHARMILA Bob (2020)   Next INR check:  2020   Priority:  Acute   Target end date:  Indefinite    Indications    H/O mitral valve replacement [Z95.2]  Paroxysmal atrial fibrillation (HCC) [I48.0]  Deep vein thrombosis (HCC) [I82.409] [I82.409]             Anticoagulation Episode Summary     INR check location:      Preferred lab:      Send INR reminders to:      Comments:        Anticoagulation Care Providers     Provider Role Specialty Phone number    Sumanth Yancey M.D. Referring Cardiology [phone]    [facility] Responsible  [phone]        Anticoagulation Patient Findings      HPI:  Morales Olivier seen in clinic today, on anticoagulation therapy with warfarin for PAF.   Changes to current medical/health status since last appt: none  Denies signs/symptoms of bleeding and/or thrombosis since the last appt.    Denies any interval changes to diet  Denies any interval changes to medications since last appt.   Denies any complications or cost restrictions with current therapy.   BP recorded in vitals.  Confirmed dosing regimen.     A/P   INR  therapeutic.   Pt is to continue with current warfarin dosing regimen.     Follow up appointment in 3 week(s).    Adolph Arnett, PharmD

## 2020-08-11 ENCOUNTER — APPOINTMENT (OUTPATIENT)
Dept: SLEEP MEDICINE | Facility: MEDICAL CENTER | Age: 65
End: 2020-08-11
Payer: MEDICARE

## 2020-08-27 ENCOUNTER — ANTICOAGULATION VISIT (OUTPATIENT)
Dept: VASCULAR LAB | Facility: MEDICAL CENTER | Age: 65
End: 2020-08-27
Attending: INTERNAL MEDICINE
Payer: MEDICARE

## 2020-08-27 VITALS — HEART RATE: 112 BPM | DIASTOLIC BLOOD PRESSURE: 78 MMHG | SYSTOLIC BLOOD PRESSURE: 115 MMHG

## 2020-08-27 DIAGNOSIS — I48.0 PAROXYSMAL ATRIAL FIBRILLATION (HCC): ICD-10-CM

## 2020-08-27 DIAGNOSIS — Z95.2 H/O MITRAL VALVE REPLACEMENT: ICD-10-CM

## 2020-08-27 DIAGNOSIS — I82.4Z9 DEEP VEIN THROMBOSIS (DVT) OF DISTAL VEIN OF LOWER EXTREMITY, UNSPECIFIED CHRONICITY, UNSPECIFIED LATERALITY (HCC): ICD-10-CM

## 2020-08-27 LAB — INR PPP: 3.6 (ref 2–3.5)

## 2020-08-27 PROCEDURE — 99212 OFFICE O/P EST SF 10 MIN: CPT | Performed by: PHARMACIST

## 2020-08-27 PROCEDURE — 85610 PROTHROMBIN TIME: CPT

## 2020-08-27 RX ORDER — WARFARIN SODIUM 5 MG/1
TABLET ORAL
Qty: 90 TAB | Refills: 2 | Status: SHIPPED | OUTPATIENT
Start: 2020-08-27 | End: 2020-01-01 | Stop reason: SDUPTHER

## 2020-08-27 NOTE — PROGRESS NOTES
Anticoagulation Summary  As of 8/27/2020    INR goal:  2.5-3.5   TTR:  51.1 % (12 mo)   INR used for dosing:  3.60 (8/27/2020)   Warfarin maintenance plan:  2 mg (2 mg x 1) every Fri; 5 mg (5 mg x 1) all other days   Weekly warfarin total:  32 mg   Plan last modified:  SHARMILA Bob (5/28/2020)   Next INR check:  9/17/2020   Priority:  Acute   Target end date:  Indefinite    Indications    H/O mitral valve replacement [Z95.2]  Paroxysmal atrial fibrillation (HCC) [I48.0]  Deep vein thrombosis (HCC) [I82.409] [I82.409]             Anticoagulation Episode Summary     INR check location:      Preferred lab:      Send INR reminders to:      Comments:        Anticoagulation Care Providers     Provider Role Specialty Phone number    Sumanth Yancey M.D. Referring Cardiology [phone]    [facility] Responsible  [phone]                Anticoagulation Patient Findings      HPI:  Morales Olivier seen in clinic today, on anticoagulation therapy with warfarin for MVR  Changes to current medical/health status since last appt: still dealing with itchy body rash  Denies signs/symptoms of bleeding and/or thrombosis since the last appt.    Denies any interval changes to diet  Denies any interval changes to medications since last appt.   Denies any complications or cost restrictions with current therapy.   Verified current warfarin dosing schedule.   BP recorded in vitals.       A/P   INR  is supra-therapeutic.   Will have pt take a reduced warfarin dose today of 2mg x1 and then resume his normal warfarin dosing schedule.     Follow up appointment in 3 week(s).    Denise Brown, PharmD

## 2020-09-17 ENCOUNTER — ANTICOAGULATION VISIT (OUTPATIENT)
Dept: VASCULAR LAB | Facility: MEDICAL CENTER | Age: 65
End: 2020-09-17
Attending: INTERNAL MEDICINE
Payer: MEDICARE

## 2020-09-17 VITALS
WEIGHT: 268.2 LBS | BODY MASS INDEX: 38.48 KG/M2 | HEART RATE: 62 BPM | SYSTOLIC BLOOD PRESSURE: 103 MMHG | DIASTOLIC BLOOD PRESSURE: 49 MMHG

## 2020-09-17 DIAGNOSIS — I48.0 PAROXYSMAL ATRIAL FIBRILLATION (HCC): ICD-10-CM

## 2020-09-17 DIAGNOSIS — Z95.2 H/O MITRAL VALVE REPLACEMENT: ICD-10-CM

## 2020-09-17 DIAGNOSIS — I82.4Z9 DEEP VEIN THROMBOSIS (DVT) OF DISTAL VEIN OF LOWER EXTREMITY, UNSPECIFIED CHRONICITY, UNSPECIFIED LATERALITY (HCC): ICD-10-CM

## 2020-09-17 LAB — INR PPP: 3 (ref 2–3.5)

## 2020-09-17 PROCEDURE — 85610 PROTHROMBIN TIME: CPT

## 2020-09-17 PROCEDURE — 99211 OFF/OP EST MAY X REQ PHY/QHP: CPT

## 2020-09-17 ASSESSMENT — FIBROSIS 4 INDEX: FIB4 SCORE: 2.211083193570266566

## 2020-09-17 NOTE — PROGRESS NOTES
Anticoagulation Summary  As of 9/17/2020    INR goal:  2.5-3.5   TTR:  52.8 % (1 y)   INR used for dosing:  3.00 (9/17/2020)   Warfarin maintenance plan:  2 mg (2 mg x 1) every Fri; 5 mg (5 mg x 1) all other days   Weekly warfarin total:  32 mg   Plan last modified:  SHARMILA Bob (5/28/2020)   Next INR check:  10/15/2020   Priority:  Acute   Target end date:  Indefinite    Indications    H/O mitral valve replacement [Z95.2]  Paroxysmal atrial fibrillation (HCC) [I48.0]  Deep vein thrombosis (HCC) [I82.409] [I82.409]             Anticoagulation Episode Summary     INR check location:      Preferred lab:      Send INR reminders to:      Comments:        Anticoagulation Care Providers     Provider Role Specialty Phone number    Sumanth Yancey M.D. Referring Cardiology [phone]    [facility] Responsible  [phone]             Anticoagulation Patient Findings  Patient Findings     Negatives:  Signs/symptoms of thrombosis, Signs/symptoms of bleeding, Laboratory test error suspected, Change in health, Change in alcohol use, Change in activity, Upcoming invasive procedure, Emergency department visit, Upcoming dental procedure, Missed doses, Extra doses, Change in medications, Change in diet/appetite, Hospital admission, Bruising, Other complaints          HPI:  Morales Olivier seen in clinic today, on anticoagulation therapy with warfarin for MVR, atrial fib, DVT  Changes to current medical/health status since last appt: none  Denies signs/symptoms of bleeding and/or thrombosis since the last appt.    Denies any interval changes to diet  Denies any interval changes to medications since last appt.   Denies any complications or cost restrictions with current therapy.   Verified current warfarin dosing schedule.   BP recorded in vitals.       A/P   INR  therapeutic.   Pt is to continue with current warfarin dosing regimen.    Follow up appointment in 4 week(s).    Yudi Engle  PharmD

## 2020-09-21 LAB — INR BLD: 3 (ref 0.9–1.2)

## 2020-10-01 NOTE — PROGRESS NOTES
Chief Complaint   Patient presents with   • Results     HST        HPI:  Morales Olivier is a 65 y.o. year old male here today for follow-up on ESTELA.  Last OV 4/27/20 with Dr. Schmitt.    Patient was previously diagnosed with sleep apnea by [facility] 2015 PSG showing severe sleep apnea with AHI of 3081%.  Patient was intolerant to CPAP in the past.  [facility] sleep study from 5/5/20 indicates severe sleep apnea with an overall AHI of 40.3 to 43.1/h and O2 donna less than 70%.  Reviewed finds with patient.  Patient notes poor sleep at this time and waking multiple times per night.  He like to start therapy right away.  We will opt for auto CPAP at this time and continue to monitor him closely.  He denies cardiac or pulmonary symptoms.  He notes ongoing itching of his forearms and lower extremities for the last 6 months and followed by primary care at [facility].  He is going to  cream to use for this.  Advised patient if rash does not improve to be seen in urgent care right away.  It appears he may have a history of scabies and this may be what he continues to deal with at this time after review of his legs.    ROS: As per HPI and otherwise negative if not stated.    Past Medical History:   Diagnosis Date   • Atrial fibrillation (HCC)    • Back pain    • Bronchitis    • CAD (coronary artery disease)    • COPD (chronic obstructive pulmonary disease) (HCC)    • Gout    • Heart valve disease     mitral valve replacement (bovine)   • Hypertension    • Kidney stone    • Obesity    • Open wound 9/2/2009   • Pacemaker    • Personal history of venous thrombosis and embolism 2009    DVT right leg   • PVC (premature ventricular contraction) 4/13/2019   • Renal disorder     kidney stones   • Type II or unspecified type diabetes mellitus without mention of complication, not stated as uncontrolled     DM type II- diet controlled       Past Surgical History:   Procedure Laterality Date   • MITRAL VALVE  REPLACE  3/8/2017    Procedure: MITRAL VALVE REPLACE-REDO;  Surgeon: Cornelia Wright M.D.;  Location: SURGERY [facility];  Service:    • KD  3/8/2017    Procedure: KD;  Surgeon: Cornelia Wright M.D.;  Location: SURGERY [facility];  Service:    • IRRIGATION & DEBRIDEMENT GENERAL  7/20/2009    Performed by MICHEL URBINA at SURGERY [facility]   • WIDE EXCISION  7/20/2009    Performed by MICHEL URBINA at SURGERY [facility]   • ANGIOGRAM  7/4/2009    Performed by MICHEL URBINA at SURGERY [facility]   • CATH REMOVAL  7/4/2009    Performed by MICHEL URBINA at SURGERY [facility]   • THROMBECTOMY  7/4/2009    Performed by MICHEL URBINA at SURGERY [facility]   • EMBOLECTOMY  7/4/2009    Performed by MICHEL URBINA at SURGERY [facility]   • ANGIOGRAM  7/3/2009    Performed by MORGAN WARD at SURGERY [facility]   • MITRAL VALVE REPLACE  3/14/08    Performed by CORNELIA WRIGHT at SURGERY [facility]   • MAZE PROCEDURE  3/14/08    Performed by CORNELIA WRIGHT at SURGERY [facility]   • OTHER CARDIAC SURGERY  2008    mitral valve replacement   • AORTIC VALVE REPLACEMENT     • OTHER ABDOMINAL SURGERY      imbulica repair        Family History   Problem Relation Age of Onset   • Diabetes Mother    • Lung Disease Father    • Heart Disease Neg Hx        Social History     Socioeconomic History   • Marital status: Single     Spouse name: Not on file   • Number of children: Not on file   • Years of education: Not on file   • Highest education level: Not on file   Occupational History   • Not on file   Social Needs   • Financial resource strain: Not on file   • Food insecurity     Worry: Not on file     Inability: Not on file   • Transportation needs     Medical: Not on file     Non-medical: Not on file   Tobacco Use   • Smoking status: Former Smoker     Packs/day: 2.50     Years: 9.00     Pack years: 22.50     Types: Cigarettes     Quit date: 1/1/1981  "    Years since quittin.7   • Smokeless tobacco: Never Used   Substance and Sexual Activity   • Alcohol use: No   • Drug use: Yes     Types: Marijuana   • Sexual activity: Not on file   Lifestyle   • Physical activity     Days per week: Not on file     Minutes per session: Not on file   • Stress: Not on file   Relationships   • Social connections     Talks on phone: Not on file     Gets together: Not on file     Attends [employer] service: Not on file     Active member of club or organization: Not on file     Attends meetings of clubs or organizations: Not on file     Relationship status: Not on file   • Intimate partner violence     Fear of current or ex partner: Not on file     Emotionally abused: Not on file     Physically abused: Not on file     Forced sexual activity: Not on file   Other Topics Concern   • Not on file   Social History Narrative   • Not on file       Allergies as of 10/01/2020   • (No Known Allergies)        Vitals:  /70 (BP Location: Left arm, Patient Position: Sitting, BP Cuff Size: Adult)   Pulse 88   Resp 16   Ht 1.778 m (5' 10\")   Wt 122.5 kg (270 lb)   SpO2 95%     Current medications as of today   Current Outpatient Medications   Medication Sig Dispense Refill   • warfarin (COUMADIN) 5 MG Tab Take 1 tab by mouth daily or as directed by anticoagulation clinic 90 Tab 2   • warfarin (COUMADIN) 2 MG Tab Take 1 Tab by mouth every day. 30 Tab 1   • tamsulosin (FLOMAX) 0.4 MG capsule TAKE 1 CAPSULE BY MOUTH EVERY DAY 30 Cap 0   • allopurinol (ZYLOPRIM) 100 MG Tab Take 100 mg by mouth every morning.     • metoprolol SR (TOPROL XL) 25 MG TABLET SR 24 HR Take 50 mg by mouth every morning.     • albuterol 108 (90 Base) MCG/ACT Aero Soln inhalation aerosol Inhale 2 Puffs by mouth every 6 hours as needed for Shortness of Breath.     • metFORMIN (GLUCOPHAGE) 500 MG Tab Take 500 mg by mouth 2 times a day, with meals.       No current facility-administered medications for this visit.  "         Physical Exam:   Gen:           Alert and oriented, No apparent distress. Mood and affect appropriate, normal interaction with examiner.  Eyes:          PERRL, EOM intact, sclere white, conjunctive moist. Glasses.  Ears:          Not examined.   Hearing:     Grossly intact.  Nose:          Normal, no lesions or deformities.  Dentition:    Good dentition.  Oropharynx:   mask  Mallampati Classification: mask  Neck:        Supple, trachea midline, no masses.  Respiratory Effort: No intercostal retractions or use of accessory muscles.   Lung Auscultation:      Clear to auscultation bilaterally; no rales, rhonchi or wheezing.  CV:            Regular rate and rhythm. No murmurs, rubs or gallops.  Abd:           Not examined. Round belly.  Lymphadenopathy: Not examined.  Gait and Station: Uses cane.  Digits and Nails: No clubbing, cyanosis, petechiae, or nodes.   Cranial Nerves: II-XII grossly intact.  Skin:        No rashes, lesions or ulcers noted.               Ext:           scratch marks, erythema and dried blood to legs and forearms.      Assessment:  1. ESTELA (obstructive sleep apnea)  DME CPAP   2. Itching     3. Rash     4. Paroxysmal atrial fibrillation (HCC)     5. Obesity (BMI 30-39.9)  Height And Weight   6. Former smoker         Immunizations:    Flu:recommend  Pneumovax 23:recommend  Prevnar 13:recommend    Plan:  1. DME CPAP 8-18cm H20 nightly.  Patient understands they may have mask fits within first 30 days of therapy covered by insurance to obtain best fit.  Patient understands the need to use device every night for >4hrs to meet compliance standards for insurance purposes.  2. Discusses sleep hygiene. Patient concerned about ongoing insomnia from untreated ESTELA.   RX benadryl 25mg qhs prn prior to putting CPAP mask on for short term use only to acclimate to acclimate to therapy.  3. Encouraged weight loss through diet/exercise.  4. F/u in 2-3 mos for first compliance check on therapy, sooner if  needed. Recommend CNOX on pap once acclimated or if having poor response to APAP to pursue in lab titration study.    Please note that this dictation was created using voice recognition software. I have made every reasonable attempt to correct obvious errors, but it is possible there are errors of grammar and possibly content that I did not discover before finalizing the note.

## 2020-10-01 NOTE — PATIENT INSTRUCTIONS
Patient understands they may have mask fits within first 30 days of therapy covered by insurance to obtain best fit.    Patient understands the need to use device every night for >4hrs to meet compliance standards for insurance purposes.    May use Benadryl 25mg prior to bedtime to help initiate sleep.

## 2020-10-15 NOTE — PROGRESS NOTES
Anticoagulation Summary  As of 10/15/2020    INR goal:  2.5-3.5   TTR:  54.1 % (1.1 y)   INR used for dosin.30 (10/15/2020)   Warfarin maintenance plan:  2 mg (2 mg x 1) every Fri; 5 mg (5 mg x 1) all other days   Weekly warfarin total:  32 mg   Plan last modified:  SHARMILA Bob (2020)   Next INR check:  2020   Priority:  Acute   Target end date:  Indefinite    Indications    H/O mitral valve replacement [Z95.2]  Paroxysmal atrial fibrillation (HCC) [I48.0]  Deep vein thrombosis (HCC) [I82.409] [I82.409]             Anticoagulation Episode Summary     INR check location:      Preferred lab:      Send INR reminders to:      Comments:        Anticoagulation Care Providers     Provider Role Specialty Phone number    Sumanth Yancey M.D. Referring Cardiology [phone]    [facility] Responsible  [phone]        Anticoagulation Patient Findings      HPI:  Morales Olivier seen in clinic today for follow up on anticoagulation therapy in the presence of DVT, MVR, AF.   Denies any changes to current medical/health status since last appointment.   Denies any medication or diet changes.   No current symptoms of bleeding or thrombosis reported.  Denies missing any doses.    A/P:   INR subtherapeutic. No recent VTEs. CHADS 2 score = 2.   Will increase one dose then continue current regimen.   BP recorded in vitals.    Follow up appointment in 4 week(s).    Next Appointment:  at 10:45 am.    Yelena LOYA

## 2020-11-12 NOTE — PROGRESS NOTES
Anticoagulation Summary  As of 2020    INR goal:  2.5-3.5   TTR:  54.9 % (1.2 y)   INR used for dosin.90 (2020)   Warfarin maintenance plan:  2 mg (2 mg x 1) every Fri; 5 mg (5 mg x 1) all other days   Weekly warfarin total:  32 mg   Plan last modified:  SHARMILA Bob (2020)   Next INR check:  2020   Priority:  Acute   Target end date:  Indefinite    Indications    H/O mitral valve replacement [Z95.2]  Paroxysmal atrial fibrillation (HCC) [I48.0]  Deep vein thrombosis (HCC) [I82.409] [I82.409]             Anticoagulation Episode Summary     INR check location:      Preferred lab:      Send INR reminders to:      Comments:        Anticoagulation Care Providers     Provider Role Specialty Phone number    Sumanth Yancey M.D. Referring Cardiology [phone]    [facility] Responsible  [phone]        Anticoagulation Patient Findings      HPI:  Morales Olivier seen in clinic today for follow up on anticoagulation therapy in the presence of MVR, AF, DVT.   Denies any changes to current medical/health status since last appointment.   Denies any medication or diet changes.   No current symptoms of bleeding or thrombosis reported.    A/P:   INR therapeutic.   Continue current regimen.   BP recorded in vitals.    Follow up appointment in 4 week(s).    Next Appointment: Thursday, Dec 10, 2020 at 10:45 am.    Yelena LOYA

## 2020-11-20 PROBLEM — R42 LIGHTHEADEDNESS: Status: ACTIVE | Noted: 2020-01-01

## 2020-11-20 PROBLEM — J44.9 COPD (CHRONIC OBSTRUCTIVE PULMONARY DISEASE) (HCC): Status: ACTIVE | Noted: 2020-01-01

## 2020-11-20 NOTE — ASSESSMENT & PLAN NOTE
-Resume home lisinopril, and metoprolol.  Monitor blood pressure trend closely.  -orthostatic as above

## 2020-11-20 NOTE — PROGRESS NOTES
Assumed care of pt. Bedside report completed with ER RN. Pt alert and oriented. Pt denies pain for this RN. Pt resting comfortably in bed. Oriented to room. Call light within reach. Bed low and locked. Offered needs.

## 2020-11-20 NOTE — ED NOTES
Called Tele floor Bed is ready. Pt is aware of POC. Pt belongings are on bed. Pt is ready for transport.

## 2020-11-20 NOTE — ED NOTES
Covid-19 swab collected. Gave bedside report to Tele RN. Pt transferred to floor on monitors, All pt care responsibilities relinquished.

## 2020-11-20 NOTE — ED PROVIDER NOTES
"ED Provider Note    CHIEF COMPLAINT  Chief Complaint   Patient presents with   • Chest Pain     Pt BIBA c/o dizziness and chest \"lightness\" pt is paced w/ some capture CABG 17' pacer 18'. both done at [facility].    • Dizziness       HPI  Morales Olivier is a 65 y.o. male who presents to the emergency department with complaint of chest pain, dizziness, lightheadedness.  The patient states that yesterday he had an episode of chest pain lasting from 15 to 20 seconds, with severe pain the left side of his chest, felt like someone is punching him, is no radiation to his back, to his jaw, to his arm.  Since that time it subsided but he is planing of feeling weird sensations in his chest that are new for him.  In addition, he states that he woke up this morning and when he sat up he felt very lightheaded and dizzy.  He denies vertigo-like symptoms, loss of sensation or strength to arms or legs, visual changes, hearing changes, nausea, vomiting, recent trauma.  The patient does have a history of mitral valve replacement x2 and is on Coumadin for this.  He also is complaining of a rash in upper lower extremities is increasing severity for last several weeks and believes he is allergic to his detergent.  REVIEW OF SYSTEMS  Positives as above. Pertinent negatives include fever, shakes, chills, sweats, lower extremity swelling.  All other 10 review of systems are negative    PAST MEDICAL HISTORY  Past Medical History:   Diagnosis Date   • Atrial fibrillation (HCC)    • Back pain    • Bronchitis    • CAD (coronary artery disease)    • COPD (chronic obstructive pulmonary disease) (HCC)    • Gout    • Heart valve disease     mitral valve replacement (bovine)   • Hypertension    • Kidney stone    • Obesity    • Open wound 9/2/2009   • Pacemaker    • Personal history of venous thrombosis and embolism 2009    DVT right leg   • PVC (premature ventricular contraction) 4/13/2019   • Renal disorder     kidney stones   • Type II or " unspecified type diabetes mellitus without mention of complication, not stated as uncontrolled     DM type II- diet controlled       FAMILY HISTORY  Noncontributory    SOCIAL HISTORY  Social History     Socioeconomic History   • Marital status: Single     Spouse name: Not on file   • Number of children: Not on file   • Years of education: Not on file   • Highest education level: Not on file   Occupational History   • Not on file   Social Needs   • Financial resource strain: Not on file   • Food insecurity     Worry: Not on file     Inability: Not on file   • Transportation needs     Medical: Not on file     Non-medical: Not on file   Tobacco Use   • Smoking status: Former Smoker     Packs/day: 2.50     Years: 9.00     Pack years: 22.50     Types: Cigarettes     Quit date: 1981     Years since quittin.9   • Smokeless tobacco: Never Used   Substance and Sexual Activity   • Alcohol use: No   • Drug use: Yes     Types: Marijuana   • Sexual activity: Not on file   Lifestyle   • Physical activity     Days per week: Not on file     Minutes per session: Not on file   • Stress: Not on file   Relationships   • Social connections     Talks on phone: Not on file     Gets together: Not on file     Attends Gnosticist service: Not on file     Active member of club or organization: Not on file     Attends meetings of clubs or organizations: Not on file     Relationship status: Not on file   • Intimate partner violence     Fear of current or ex partner: Not on file     Emotionally abused: Not on file     Physically abused: Not on file     Forced sexual activity: Not on file   Other Topics Concern   • Not on file   Social History Narrative   • Not on file       SURGICAL HISTORY  Past Surgical History:   Procedure Laterality Date   • MITRAL VALVE REPLACE  3/8/2017    Procedure: MITRAL VALVE REPLACE-REDO;  Surgeon: Cornelia Wright M.D.;  Location: SURGERY [facility];  Service:    • KD  3/8/2017    Procedure: KD;   Surgeon: Cornelia Wright M.D.;  Location: SURGERY [facility];  Service:    • IRRIGATION & DEBRIDEMENT GENERAL  7/20/2009    Performed by MICHEL URBINA at SURGERY [facility]   • WIDE EXCISION  7/20/2009    Performed by MICHEL URBINA at SURGERY [facility]   • ANGIOGRAM  7/4/2009    Performed by MICHEL URBINA at SURGERY [facility]   • CATH REMOVAL  7/4/2009    Performed by MICHEL URBINA at SURGERY [facility]   • THROMBECTOMY  7/4/2009    Performed by MICHEL URBINA at SURGERY [facility]   • EMBOLECTOMY  7/4/2009    Performed by MICHEL URBINA at SURGERY [facility]   • ANGIOGRAM  7/3/2009    Performed by MORGAN WARD at SURGERY [facility]   • MITRAL VALVE REPLACE  3/14/08    Performed by CORNELIA WRIGHT at SURGERY [facility]   • MAZE PROCEDURE  3/14/08    Performed by CORNELIA WRIGHT at [facility]   • OTHER CARDIAC SURGERY  2008    mitral valve replacement   • AORTIC VALVE REPLACEMENT     • OTHER ABDOMINAL SURGERY      imbulica repair        CURRENT MEDICATIONS  Home Medications     Reviewed by Richie Mae (Pharmacy Tech) on 11/20/20 at 1021  Med List Status: Complete   Medication Last Dose Status   acetaminophen (TYLENOL) 500 MG Tab FEW DAYS AGO Active   albuterol 108 (90 Base) MCG/ACT Aero Soln inhalation aerosol PRN Active   allopurinol (ZYLOPRIM) 100 MG Tab 11/20/2020 Active   cetirizine (ZYRTEC) 10 MG Tab 11/20/2020 Active   lisinopril (PRINIVIL) 2.5 MG Tab 11/19/2020 Active   metFORMIN (GLUCOPHAGE) 500 MG Tab 11/20/2020 Active   metoprolol SR (TOPROL XL) 50 MG TABLET SR 24 HR 11/20/2020 Active   tamsulosin (FLOMAX) 0.4 MG capsule 11/19/2020 Active   warfarin (COUMADIN) 2 MG Tab 11/13/2020 Active   warfarin (COUMADIN) 5 MG Tab 11/19/2020 Active                ALLERGIES  No Known Allergies    PHYSICAL EXAM  VITAL SIGNS: /74   Pulse (!) 109   Temp 35.8 °C (96.5 °F) (Temporal)   Resp (!) 44   Wt 123.8 kg (273 lb)    SpO2 95%   BMI 39.17 kg/m²      Constitutional: Well developed, Well nourished, No acute distress, Non-toxic appearance.   Eyes: PERRLA, EOMI, Conjunctiva normal, No discharge.   Cardiovascular: Normal heart rate, Normal rhythm, 2/6 holosystolic murmurs, No rubs, No gallops, and intact distal pulses, sternotomy scar.   Thorax & Lungs:  No respiratory distress, no rales, no rhonchi, No wheezing, No chest wall tenderness, pacemaker right chest wall.   Abdomen: Bowel sounds normal, Soft, No tenderness, No guarding, No rebound, No pulsatile masses.   Skin: Warm, Dry, Fuhs erythematous rash upper lower extremities with slight excoriation  Extremities: Full range of motion, no deformity, no pedal edema.  Neurologic: Alert & oriented x 3, No focal deficits noted, acting appropriately on exam.  Psychiatric: Affect normal for clinical presentation.      RADIOLOGY/PROCEDURES  CT-HEAD W/O   Final Result      1.  Cerebral atrophy.      2.  Otherwise, Head CT without contrast within normal limits. No evidence of acute cerebral infarction, hemorrhage or mass lesion.      DX-CHEST-PORTABLE (1 VIEW)   Final Result      1.  Minimal RIGHT pleural fluid versus pleural reactive change.   2.  No pneumonia or pulmonary edema.   3.  Moderate cardiomegaly, stable.      EC-ECHOCARDIOGRAM COMPLETE W/O CONT    (Results Pending)   NM-CARDIAC STRESS TEST    (Results Pending)     Results for orders placed or performed during the hospital encounter of 11/20/20   CBC with Differential   Result Value Ref Range    WBC 4.4 (L) 4.8 - 10.8 K/uL    RBC 4.49 (L) 4.70 - 6.10 M/uL    Hemoglobin 14.5 14.0 - 18.0 g/dL    Hematocrit 44.2 42.0 - 52.0 %    MCV 98.4 (H) 81.4 - 97.8 fL    MCH 32.3 27.0 - 33.0 pg    MCHC 32.8 (L) 33.7 - 35.3 g/dL    RDW 52.0 (H) 35.9 - 50.0 fL    Platelet Count 138 (L) 164 - 446 K/uL    MPV 10.6 9.0 - 12.9 fL    Neutrophils-Polys 64.20 44.00 - 72.00 %    Lymphocytes 18.00 (L) 22.00 - 41.00 %    Monocytes 8.30 0.00 - 13.40 %     Eosinophils 8.30 (H) 0.00 - 6.90 %    Basophils 0.70 0.00 - 1.80 %    Immature Granulocytes 0.50 0.00 - 0.90 %    Nucleated RBC 0.00 /100 WBC    Neutrophils (Absolute) 2.85 1.82 - 7.42 K/uL    Lymphs (Absolute) 0.80 (L) 1.00 - 4.80 K/uL    Monos (Absolute) 0.37 0.00 - 0.85 K/uL    Eos (Absolute) 0.37 0.00 - 0.51 K/uL    Baso (Absolute) 0.03 0.00 - 0.12 K/uL    Immature Granulocytes (abs) 0.02 0.00 - 0.11 K/uL    NRBC (Absolute) 0.00 K/uL   Complete Metabolic Panel (CMP)   Result Value Ref Range    Sodium 137 135 - 145 mmol/L    Potassium 4.5 3.6 - 5.5 mmol/L    Chloride 104 96 - 112 mmol/L    Co2 23 20 - 33 mmol/L    Anion Gap 10.0 7.0 - 16.0    Glucose 157 (H) 65 - 99 mg/dL    Bun 20 8 - 22 mg/dL    Creatinine 0.96 0.50 - 1.40 mg/dL    Calcium 9.0 8.5 - 10.5 mg/dL    AST(SGOT) 14 12 - 45 U/L    ALT(SGPT) 11 2 - 50 U/L    Alkaline Phosphatase 73 30 - 99 U/L    Total Bilirubin 0.3 0.1 - 1.5 mg/dL    Albumin 3.9 3.2 - 4.9 g/dL    Total Protein 6.4 6.0 - 8.2 g/dL    Globulin 2.5 1.9 - 3.5 g/dL    A-G Ratio 1.6 g/dL   Troponin   Result Value Ref Range    Troponin T 18 6 - 19 ng/L   Magnesium   Result Value Ref Range    Magnesium 1.8 1.5 - 2.5 mg/dL   Phosphorus   Result Value Ref Range    Phosphorus 2.4 (L) 2.5 - 4.5 mg/dL   D-DIMER   Result Value Ref Range    D-Dimer Screen 0.60 (H) 0.00 - 0.50 ug/mL (FEU)   Prothrombin Time   Result Value Ref Range    PT 35.2 (H) 12.0 - 14.6 sec    INR 3.37 (H) 0.87 - 1.13   ESTIMATED GFR   Result Value Ref Range    GFR If African American >60 >60 mL/min/1.73 m 2    GFR If Non African American >60 >60 mL/min/1.73 m 2   EKG   Result Value Ref Range    Report       [facility]    Test Date:  2020  Pt Name:    AILYN MCINTOSH                 Department: ER  MRN:        [id number]                      Room:       [facility]  Gender:     Male                         Technician: [id number]  :        1955                   Requested By:ER TRIAGE  PROTOCOL  Order #:    [id number]                    Reading MD: NEFTALI MULLER DO    Measurements  Intervals                                Axis  Rate:       86                           P:          0  NM:         96                           QRS:        78  QRSD:       90                           T:          -49  QT:         444  QTc:        531    Interpretive Statements  VENTRICULAR-PACED COMPLEXES  NO FURTHER RHYTHM ANALYSIS ATTEMPTED DUE TO PACED RHYTHM  BORDERLINE REPOLARIZATION ABNORMALITY  PROLONGED QT INTERVAL  Compared to ECG 03/01/2020 01:16:15  Prolonged QT interval now present  Ectopic atrial rhythm no longer present  Atrial premature complex(es) no longer  present  Electronically Signed On 11- 9:47:00 PST by NEFTALI MULLER DO           COURSE & MEDICAL DECISION MAKING  Pertinent Labs & Imaging studies reviewed. (See chart for details)  This is a pleasant 65-year-old gentleman presents with chest pain and dizziness.  The patient is known to ST elevation myocardial infarction he has a paced rhythm.  We did interrogate his pacemaker that is a [employer] pacemaker, showed no evidence of ectopy.  Patient was feeling slightly dizzy therefore CT scan of the brain was completed secondary to having on anticoagulation there is no evidence of intracranial hemorrhage.  In addition, the patient has a negative troponin.  I am concerned secondary to the patient's history and he has a heart score of 6.  In addition, the patient does have a slight elevated D-dimer of 0.68 with corrected age and the fact that he is on anticoagulation I do not believe patient is experiencing a pulmonary embolism.  Discussed the patient with the hospitalist for evaluation.      FINAL IMPRESSION  Chest pain         Electronically signed by: Neftali Muller D.O., 11/20/2020 9:58 AM

## 2020-11-20 NOTE — ASSESSMENT & PLAN NOTE
- Body mass index is 39.17 kg/m²..  - Counseled on weight loss.  - outpatient referral for outpatient weight management.

## 2020-11-20 NOTE — ASSESSMENT & PLAN NOTE
-Troponin is negative.  EKG showed no dynamic ischemic changes.  D-dimer (age-adjusted) within normal limits.  Chest x-ray showed nothing acute.  Reasonable to pursue further noninvasive cardiac work-up.  -Continue anticoagulation with Coumadin.  NO clear arrhythmias noted on tele  -NM cardiac stress test pending, no chest pain currently, vitals are ok.   -Start as needed sublingual nitroglycerin, and morphine for pain recurrence.   -UDS pending  -For possibility of GI related chest pain, I will start him on PRN GI cocktail, and Maalox.

## 2020-11-20 NOTE — DISCHARGE PLANNING
Attempted to call pt's cellphone but no answer. CM will follow up once pt has access to hospital phone.

## 2020-11-20 NOTE — H&P
"Hospital Medicine History & Physical Note    Date of Service  11/20/2020    Primary Care Physician  Yelena Haney P.A.-C.    Consultants  none    Code Status  Full Code    Chief Complaint  Chief Complaint   Patient presents with   • Chest Pain     Pt BIBA c/o dizziness and chest \"lightness\" pt is paced w/ some capture CABG 17' pacer 18'. both done at [facility].    • Dizziness       History of Presenting Illness  65 y.o. male with significant cardiac history including CAD, paroxysmal atrial fibrillation, history of mitral valve replacement x2 on anticoagulation with Coumadin, pacemaker in place, along with history of lower extremity DVT, hypertension, COPD, and obesity, who states that since Wednesday he has noticed brief episodes of jabbing and stabbing left-sided chest pain without any radiation lasting for a few seconds, rated 9 out of 10 at, with no associated nausea, diaphoresis, or palpitations.  However has noticed dizziness/lightheadedness in the past several days, more so when he quickly changes when upright position, particularly in the morning after waking up and had to sit on the edge of the bed for a period before standing up.  This morning, he had another episode of the dizziness, associated with chest pain but he was so lightheaded that he did thought that he was going to pass out although he did not lose consciousness.  He then decided to go to the ED.    Otherwise, he has no other complaints.  He denies any fevers, chills, diarrhea, abdominal pain, dysuria, lower extremity edema, or shortness of breath.  He states he eats well and has good appetite and oral intake.  He is not on diuretics.    ED course:  The patient was initially evaluated.  Vital signs were stable.  He was not hypoxic.  Initial blood work-up were unimpressive.  He had no leukocytosis.  Electrolytes and renal function were normal.  Troponin was negative.  INR was therapeutic.  Age-adjusted D-dimer was within normal limits.  Head CT " did not show anything acute.  Chest x-ray (personally reviewed) showed minimal right pleural fluid versus pleural reactive changes with no pneumonia or edema, and with stable cardiomegaly.  EKG (personally reviewed) showed ventricular paced rhythm.  His pacemaker was interrogated, which did not show any ectopy or significant arrhythmias. He was then admitted to the hospitalist service for further work-up and management.    Review of Systems  ROS     Pertinent positives/negatives as mentioned above.     A complete review of systems was personally done by me. All other systems were negative.       Past Medical History   has a past medical history of Atrial fibrillation (HCC), Back pain, Bronchitis, CAD (coronary artery disease), COPD (chronic obstructive pulmonary disease) (HCC), Gout, Heart valve disease, Hypertension, Kidney stone, Obesity, Open wound (9/2/2009), Pacemaker, Personal history of venous thrombosis and embolism (2009), PVC (premature ventricular contraction) (4/13/2019), Renal disorder, and Type II or unspecified type diabetes mellitus without mention of complication, not stated as uncontrolled.    Surgical History   has a past surgical history that includes mitral valve replace (3/14/08); maze procedure (3/14/08); angiogram (7/3/2009); angiogram (7/4/2009); cath removal (7/4/2009); thrombectomy (7/4/2009); embolectomy (7/4/2009); irrigation & debridement general (7/20/2009); wide excision (7/20/2009); other cardiac surgery (2008); other abdominal surgery; mitral valve replace (3/8/2017); lopez (3/8/2017); and aortic valve replacement.     Family History  family history includes Diabetes in his mother; Lung Disease in his father.     Social History   reports that he quit smoking about 39 years ago. His smoking use included cigarettes. He has a 22.50 pack-year smoking history. He has never used smokeless tobacco. He reports current drug use. Drug: Marijuana. He reports that he does not drink  alcohol.    Allergies  No Known Allergies    Medications  Prior to Admission Medications   Prescriptions Last Dose Informant Patient Reported? Taking?   acetaminophen (TYLENOL) 500 MG Tab FEW DAYS AGO at PRN Patient Yes Yes   Sig: Take 1,000 mg by mouth every 6 hours as needed.   albuterol 108 (90 Base) MCG/ACT Aero Soln inhalation aerosol PRN at PRN Patient Yes No   Sig: Inhale 2 Puffs by mouth every 6 hours as needed for Shortness of Breath.   allopurinol (ZYLOPRIM) 100 MG Tab 11/20/2020 at AM Patient Yes No   Sig: Take 100 mg by mouth every morning.   cetirizine (ZYRTEC) 10 MG Tab 11/20/2020 at AM Patient Yes Yes   Sig: Take 10 mg by mouth every day.   lisinopril (PRINIVIL) 2.5 MG Tab 11/19/2020 at PM Patient Yes Yes   Sig: Take 2.5 mg by mouth every day.   metFORMIN (GLUCOPHAGE) 500 MG Tab 11/20/2020 at AM Patient Yes No   Sig: Take 500 mg by mouth 2 times a day, with meals.   metoprolol SR (TOPROL XL) 50 MG TABLET SR 24 HR 11/20/2020 at AM Patient Yes No   Sig: Take 50 mg by mouth every morning.   tamsulosin (FLOMAX) 0.4 MG capsule 11/19/2020 at PM Patient No No   Sig: TAKE 1 CAPSULE BY MOUTH EVERY DAY   warfarin (COUMADIN) 2 MG Tab 11/13/2020 at PM Patient No No   Sig: Take 1 Tab by mouth every day.   Patient taking differently: Take 2 mg by mouth See Admin Instructions. Takes 2 mg every Friday   warfarin (COUMADIN) 5 MG Tab 11/19/2020 at PM Patient No No   Sig: Take 1 tab by mouth daily or as directed by anticoagulation clinic   Patient taking differently: Take 5 mg by mouth See Admin Instructions. Takes Monday, Tuesday, Wednesday, Thursday, Saturday, and Sunday      Facility-Administered Medications: None       Physical Exam  Temp:  [35.8 °C (96.5 °F)] 35.8 °C (96.5 °F)  Pulse:  [] 109  Resp:  [18-44] 44  BP: (101-134)/(74-77) 101/74  SpO2:  [95 %-97 %] 95 %    Physical Exam  Vitals signs reviewed.   Constitutional:       General: He is not in acute distress.     Appearance: Normal appearance. He is  obese. He is not toxic-appearing or diaphoretic.      Comments: Body mass index is 39.17 kg/m².   HENT:      Head: Normocephalic and atraumatic.      Right Ear: External ear normal.      Left Ear: External ear normal.      Mouth/Throat:      Mouth: Mucous membranes are moist.      Pharynx: No oropharyngeal exudate.   Eyes:      General: No scleral icterus.     Extraocular Movements: Extraocular movements intact.      Conjunctiva/sclera: Conjunctivae normal.      Pupils: Pupils are equal, round, and reactive to light.   Neck:      Musculoskeletal: Normal range of motion and neck supple.   Cardiovascular:      Rate and Rhythm: Regular rhythm. Tachycardia present.      Heart sounds: Normal heart sounds. No murmur. No gallop.       Comments: Pacemaker in place on the right upper chest wall  Pulmonary:      Effort: Pulmonary effort is normal. No respiratory distress.      Breath sounds: No stridor. No wheezing, rhonchi or rales.      Comments: Diminished air entry B/L bases, otherwise clear to auscultation  Chest:      Chest wall: No tenderness.   Abdominal:      General: Bowel sounds are normal. There is no distension.      Palpations: Abdomen is soft. There is no mass.      Tenderness: There is no abdominal tenderness. There is no guarding or rebound.   Musculoskeletal: Normal range of motion.         General: No swelling.      Right lower leg: No edema.      Left lower leg: No edema.   Lymphadenopathy:      Cervical: No cervical adenopathy.   Skin:     General: Skin is warm and dry.      Coloration: Skin is not jaundiced.      Findings: No rash.   Neurological:      General: No focal deficit present.      Mental Status: He is alert and oriented to person, place, and time.      Cranial Nerves: No cranial nerve deficit.   Psychiatric:         Mood and Affect: Mood normal.         Behavior: Behavior normal.         Thought Content: Thought content normal.         Judgment: Judgment normal.         Laboratory:  Recent  Labs     11/20/20  0941   WBC 4.4*   RBC 4.49*   HEMOGLOBIN 14.5   HEMATOCRIT 44.2   MCV 98.4*   MCH 32.3   MCHC 32.8*   RDW 52.0*   PLATELETCT 138*   MPV 10.6     Recent Labs     11/20/20  0941   SODIUM 137   POTASSIUM 4.5   CHLORIDE 104   CO2 23   GLUCOSE 157*   BUN 20   CREATININE 0.96   CALCIUM 9.0     Recent Labs     11/20/20  0941   ALTSGPT 11   ASTSGOT 14   ALKPHOSPHAT 73   TBILIRUBIN 0.3   GLUCOSE 157*     Recent Labs     11/20/20  0941   INR 3.37*     No results for input(s): NTPROBNP in the last 72 hours.      Recent Labs     11/20/20  0941   TROPONINT 18       Imaging:  CT-HEAD W/O   Final Result      1.  Cerebral atrophy.      2.  Otherwise, Head CT without contrast within normal limits. No evidence of acute cerebral infarction, hemorrhage or mass lesion.      DX-CHEST-PORTABLE (1 VIEW)   Final Result      1.  Minimal RIGHT pleural fluid versus pleural reactive change.   2.  No pneumonia or pulmonary edema.   3.  Moderate cardiomegaly, stable.      EC-ECHOCARDIOGRAM COMPLETE W/O CONT    (Results Pending)   NM-CARDIAC STRESS TEST    (Results Pending)         Assessment/Plan:  I anticipate this patient is appropriate for observation status at this time.    * Pain in the chest- (present on admission)  Assessment & Plan  -Troponin is negative.  EKG showed no dynamic ischemic changes.  D-dimer (age-adjusted) within normal limits.  Chest x-ray showed nothing acute.  Reasonable to pursue further noninvasive cardiac work-up.  -Continue anticoagulation with Coumadin.  Check lipid profile and hemoglobin A1c for further risk stratification. We will do further cardiac monitoring to rule out arrhythmias.  -I will obtain serial troponins, and an echocardiogram.  If troponins remain negative, would proceed with cardiac stress test with nuclear cardiac stress test.  -Start as needed sublingual nitroglycerin, and morphine for pain recurrence.   -For completion, I will check urine drug screen.  -For possibility of GI  related chest pain, I will start him on PRN GI cocktail, and Maalox.      Lightheadedness- (present on admission)  Assessment & Plan  -Unclear etiology.  He does have significant cardiac history, need to rule out cardiogenic cause.  He may also be orthostatic as lightheadedness worse with upright position.  -Check orthostatic vital signs.  Check TSH, cortisol.  -Monitor on telemetry for any arrhythmias.  Had interrogation of his pacemaker, and so far does not show significant arrhythmias/PVCs/ectopies.  -Check echocardiogram especially with his history of mitral valve replacement.  -Further cardiac work-up with serial troponins, and stress test.    COPD (chronic obstructive pulmonary disease) ([facility])- (present on admission)  Assessment & Plan  -Not in acute exacerbation.  Continue RT protocol, as needed bronchodilators.    Type 2 diabetes mellitus without complication, without long-term current use of insulin ([facility])- (present on admission)  Assessment & Plan  -I will place him on sliding scale insulin coverage for now while he is in-house.  Hold Metformin.  Check hemoglobin A1c. Accu-Cheks before meals and at bedtime. Goal to keep BG between 140-180 per 2019 ADA guidelines.      Obesity (BMI 30-39.9)- (present on admission)  Assessment & Plan  - Body mass index is 39.17 kg/m²..  - Counseled on weight loss.  - outpatient referral for outpatient weight management.    Benign prostatic hyperplasia without lower urinary tract symptoms- (present on admission)  Assessment & Plan  -Resume Flomax    Essential hypertension, benign- (present on admission)  Assessment & Plan  -Resume home lisinopril, and metoprolol.  Monitor blood pressure trend closely.  -Check orthostatic vital signs.  If he is orthostatic, may need adjustment to his antihypertensives.    H/O mitral valve replacement- (present on admission)  Assessment & Plan  -Resume anticoagulation with Coumadin.  -Check echocardiogram.    Acquired circulating anticoagulants  (HCC)- (present on admission)  Assessment & Plan  -For history of mitral valve replacement, paroxysmal A. fib, and lower extremity DVT  -Resume Coumadin per pharmacy dosing    Paroxysmal atrial fibrillation (HCC)- (present on admission)  Assessment & Plan  -Rate controlled.  Continue metoprolol, and anticoagulation with Coumadin.      VTE prophylaxis: Coumadin

## 2020-11-20 NOTE — ED NOTES
Interrogation completed.  [place]  [facility]([place]) contacted by RN.

## 2020-11-20 NOTE — ASSESSMENT & PLAN NOTE
-For history of mitral valve replacement, paroxysmal A. fib, and lower extremity DVT  -Resume Coumadin per pharmacy dosing

## 2020-11-20 NOTE — ASSESSMENT & PLAN NOTE
-Unclear etiology.  Extensive cardiac history, but sounds more vertiginous, only mildly positive orthastasis  -check orthostatics BID with shift change  -monitor on tele  -get MRI brain and MRA  -consider meclizine PRN  -ambulate on tele

## 2020-11-20 NOTE — ED NOTES
Med rec completed per pt  Allergies reviewed  No PO antibiotics in the last 14 days    Pt takes Warfarin   2 mg every Friday   5 mg all other days

## 2020-11-20 NOTE — ASSESSMENT & PLAN NOTE
-I will place him on sliding scale insulin coverage for now while he is in-house.  Hold Metformin.  Check hemoglobin A1c. Accu-Cheks before meals and at bedtime. Goal to keep BG between 140-180 per 2019 ADA guidelines.

## 2020-11-20 NOTE — PROGRESS NOTES
2 RN skin check complete.   Devices in place N/A.  Skin assessed under devices N/A.  Confirmed pressure ulcers found on N/A.  New potential pressure ulcers noted on N/A. Wound consult placed N/A.  The following interventions in place N/A    Sacrum clean dry and intact, pink but blanchable   Bilateral lower legs have small scabs and flaky but intact   Bilateral heels dry and flaky    Pt ambulatory and up in room. Pt shifts side to side.

## 2020-11-21 NOTE — PROGRESS NOTES
MRI ordered for patient but patient is not sure what version of the [employer] [employer]emaker he has. Will try to contact someone at home to find the card.     MRI says procedure will most likely happen on Monday.

## 2020-11-21 NOTE — PROGRESS NOTES
Inpatient Anticoagulation Service Note    Date: 11/21/2020    Reason for Anticoagulation: Bioprosthetic Valve Replacement, Atrial Fibrillation(Bioprosthetic Mitral Valve Replacement, Hx VTE)   Target INR: 2.5 to 3.5(Continuation of Outpatient INR Goal)  [id number] VASc Score: 6  HAS-BLED Score: 1   Hemoglobin Value: 14  Hematocrit Value: 42.6  Lab Platelet Value: (!) 137    INR from last 7 days     Date/Time INR Value    11/21/20 0524  (!) 2.92    11/20/20 0941  (!) 3.37        Dose from last 7 days     Date/Time Dose (mg)    11/21/20 0800  5    11/20/20 1450  2          Average Dose (mg): (Reported home dose 5 mg daily except 2 mg on Friday)  Significant Interactions: (allopurinol)  Bridge Therapy: No     Reversal Agent Administered: Not Applicable    Comments:   66 y/o m w/ PMHx CAD, paroxysmal atrial fibrillation, history of mitral valve replacement x2 on anticoagulation with Coumadin, pacemaker in place, along with history of lower extremity DVT, hypertension, COPD, and obesity. DDI noted. No documented signs of overt bleeding.     Plan:  Warfarin 5mg po tonight. INR in AM.  Education Material Provided?: No(Chronic Coumadin Patient)  Pharmacist suggested discharge dosing: Warfarin 5 mg daily except 2 mg on Friday. Follow up with anticoagulation clinic after discharge.      Soham Rodriguez, PharmD, BCPS

## 2020-11-21 NOTE — PROGRESS NOTES
"Hospital Medicine Daily Progress Note    Date of Service  11/21/2020    Chief Complaint  65 y.o. male admitted 11/20/2020 with history of CAD, paroxysmal atrial fibrillation, history of mitral valve replacement x2 on anticoagulation with Coumadin, pacemaker in place, along with history of lower extremity DVT, hypertension, COPD, and obesity, presents with dizziness that appears vertiginous. ECHO with no clear interval changes except for possible mild new WMA. NM cardiac stress test pending. Mildly positive orthostasis.     Hospital Course  No notes on file    Interval Problem Update  Dizziness-been going on for a few weeks, especially when laying down and get out of bed. Described as \"brain jello\" and the room spinning around him. Denies any prior or recent ear infections or URI like symptoms. Feels about the same today. No chest pain now. TELE was ok.     Consultants/Specialty  none    Code Status  Full Code    Disposition  TELE    Review of Systems  Review of Systems   Constitutional: Negative for chills and fever.   HENT: Negative for ear discharge, ear pain and hearing loss.    Respiratory: Negative for cough and shortness of breath.    Cardiovascular: Negative for chest pain.   Gastrointestinal: Negative for abdominal pain, nausea and vomiting.   Skin: Negative for itching and rash.   Neurological: Positive for dizziness.   All other systems reviewed and are negative.       Physical Exam  Temp:  [36.4 °C (97.6 °F)-36.9 °C (98.4 °F)] 36.9 °C (98.4 °F)  Pulse:  [] 86  Resp:  [16-19] 16  BP: (101-128)/(70-84) 116/84  SpO2:  [92 %-96 %] 92 %    Physical Exam  Vitals signs and nursing note reviewed.   Constitutional:       General: He is not in acute distress.     Appearance: He is well-developed.      Comments: Body mass index is 37.67 kg/m².     HENT:      Head: Normocephalic and atraumatic.      Mouth/Throat:      Pharynx: No oropharyngeal exudate.   Eyes:      General: No scleral icterus.     Pupils: " Pupils are equal, round, and reactive to light.      Comments: No clear horizontal nystagmus appreciated   Neck:      Musculoskeletal: Normal range of motion and neck supple.      Thyroid: No thyromegaly.   Cardiovascular:      Rate and Rhythm: Normal rate and regular rhythm.      Heart sounds: Normal heart sounds. No murmur.   Pulmonary:      Effort: Pulmonary effort is normal. No respiratory distress.      Breath sounds: Normal breath sounds. No wheezing.   Abdominal:      General: Bowel sounds are normal. There is no distension.      Palpations: Abdomen is soft.      Tenderness: There is no abdominal tenderness.   Musculoskeletal: Normal range of motion.         General: No tenderness.      Right lower leg: No edema.      Left lower leg: No edema.      Comments: Chronic venous stasis changes   Skin:     General: Skin is warm and dry.      Findings: No rash.   Neurological:      Mental Status: He is alert and oriented to person, place, and time.      Cranial Nerves: No cranial nerve deficit.         Fluids    Intake/Output Summary (Last 24 hours) at 11/21/2020 1344  Last data filed at 11/21/2020 1000  Gross per 24 hour   Intake --   Output 700 ml   Net -700 ml       Laboratory  Recent Labs     11/20/20  0941 11/21/20  0524   WBC 4.4* 4.9   RBC 4.49* 4.37*   HEMOGLOBIN 14.5 14.0   HEMATOCRIT 44.2 42.6   MCV 98.4* 97.5   MCH 32.3 32.0   MCHC 32.8* 32.9*   RDW 52.0* 51.3*   PLATELETCT 138* 137*   MPV 10.6 10.8     Recent Labs     11/20/20  0941 11/21/20  0524   SODIUM 137 136   POTASSIUM 4.5 4.3   CHLORIDE 104 102   CO2 23 24   GLUCOSE 157* 131*   BUN 20 23*   CREATININE 0.96 1.16   CALCIUM 9.0 8.8     Recent Labs     11/20/20  0941 11/21/20  0524   INR 3.37* 2.92*         Recent Labs     11/20/20  1749   TRIGLYCERIDE 217*   HDL 22*   LDL 61       Imaging  NM-CARDIAC STRESS TEST   Final Result      EC-ECHOCARDIOGRAM COMPLETE W/O CONT   Final Result      CT-HEAD W/O   Final Result      1.  Cerebral atrophy.      2.   Otherwise, Head CT without contrast within normal limits. No evidence of acute cerebral infarction, hemorrhage or mass lesion.      DX-CHEST-PORTABLE (1 VIEW)   Final Result      1.  Minimal RIGHT pleural fluid versus pleural reactive change.   2.  No pneumonia or pulmonary edema.   3.  Moderate cardiomegaly, stable.           Assessment/Plan  * Pain in the chest- (present on admission)  Assessment & Plan  -Troponin is negative.  EKG showed no dynamic ischemic changes.  D-dimer (age-adjusted) within normal limits.  Chest x-ray showed nothing acute.  Reasonable to pursue further noninvasive cardiac work-up.  -Continue anticoagulation with Coumadin.  NO clear arrhythmias noted on tele  -NM cardiac stress test pending, no chest pain currently, vitals are ok.   -Start as needed sublingual nitroglycerin, and morphine for pain recurrence.   -UDS pending  -For possibility of GI related chest pain, I will start him on PRN GI cocktail, and Maalox.      Lightheadedness- (present on admission)  Assessment & Plan  -Unclear etiology.  Extensive cardiac history, but sounds more vertiginous, only mildly positive orthastasis  -check orthostatics BID with shift change  -monitor on tele  -get MRI brain and MRA  -consider meclizine PRN  -ambulate on tele    COPD (chronic obstructive pulmonary disease) (HCC)- (present on admission)  Assessment & Plan  -Not in acute exacerbation.  Continue RT protocol, as needed bronchodilators.    Type 2 diabetes mellitus without complication, without long-term current use of insulin (HCC)- (present on admission)  Assessment & Plan  -I will place him on sliding scale insulin coverage for now while he is in-house.  Hold Metformin.  Check hemoglobin A1c. Accu-Cheks before meals and at bedtime. Goal to keep BG between 140-180 per 2019 ADA guidelines.      Obesity (BMI 30-39.9)- (present on admission)  Assessment & Plan  - Body mass index is 39.17 kg/m²..  - Counseled on weight loss.  - outpatient referral  for outpatient weight management.    Benign prostatic hyperplasia without lower urinary tract symptoms- (present on admission)  Assessment & Plan  -Resume Flomax    Essential hypertension, benign- (present on admission)  Assessment & Plan  -Resume home lisinopril, and metoprolol.  Monitor blood pressure trend closely.  -orthostatic as above    H/O mitral valve replacement- (present on admission)  Assessment & Plan  -Resume anticoagulation with Coumadin.  -ECHO with no clear change in trans-valvular gradients.    Acquired circulating anticoagulants (HCC)- (present on admission)  Assessment & Plan  -For history of mitral valve replacement, paroxysmal A. fib, and lower extremity DVT  -Resume Coumadin per pharmacy dosing    Paroxysmal atrial fibrillation (HCC)- (present on admission)  Assessment & Plan  -Rate controlled.  Continue metoprolol, and anticoagulation with Coumadin.       VTE prophylaxis: coumadin

## 2020-11-21 NOTE — PROGRESS NOTES
Inpatient Anticoagulation Service Note    Date: 11/20/2020    Reason for Anticoagulation: Bioprosthetic Valve Replacement, Atrial Fibrillation, Bioprosthetic Mitral Valve Replacement, Hx VTE  Target INR: 2.5 to 3.5 (Continuation of Outpatient INR Goal)  [id number] VASc Score: 6  HAS-BLED Score: 1   Hemoglobin Value: 14.5  Hematocrit Value: 44.2  Lab Platelet Value: (!) 138    INR from last 7 days     Date/Time INR Value    11/20/20 0941  (!) 3.37        Dose from last 7 days     Date/Time Dose (mg)    11/20/20 1450  2        Average Dose (mg): Reported home dose 5 mg daily except 2 mg on Friday  Significant Interactions: allopurinol  Bridge Therapy: No   Reversal Agent Administered: Not Applicable    Comments: Reviewed patient's history.  Outpatient INR goal 2.5-3.5 with hx of bioprosthetic mitral valve replacement, Afib, and hx VTE.  D/W [facility], they did receive a new consult order 7/18/19 to increase INR goal to 2.5-3.5, unable to determine reason currently.  Kellie has reportedly been taking 5 mg daily except 2 mg on Friday's since 5/28/2020 at which time dosing was decreased for recurrent supratherapeutic INRs.  INR has been relatively stable with occasional supratherapeutic INRs.  Admitted with chest pain and dizziness, pacer interrogated.  Plan for pharmacological stress test.  INR on admission therapeutic at 3.37, with last dose reported as last night.  No new DDI.        Plan:  Continue reported home dose of 5 mg daily except 2 mg on Friday's, with 2 mg to be given tonight.  INR daily x 3 ordered.  Pharmacy to monitor and adjust as needed.      Education Material Provided?: No(Chronic Coumadin Patient)  Pharmacist suggested discharge dosing: Coumadin 5 mg daily except 2 mg on Friday's, with INR follow up outpatient 3-5 days post discharge.       Christina Westfall, PharmD, BCPS

## 2020-11-21 NOTE — PROGRESS NOTES
Assumed care of PT A&O 4. Pt resting in bed with no signs of labored breathing. On RA. Tele monitor in place, cardiac rhythm being monitored. Call light within reach, bed in lowest position, upper bed rails up. Pt was updated on plan of care for the day. Will continue to monitor.

## 2020-11-21 NOTE — PROGRESS NOTES
Bedside report received from day RN. PT is A&Ox4. PT is on room air. Tele box in place and verified by monitor room. No S/S of pain or discomfort at this time. PT is educated to use call light. Bed is in lowest and locked position. Will continue plan of care.

## 2020-11-22 NOTE — PROGRESS NOTES
This pt has an implanted pacer ([employer] ACCENT SR 1110) that is non-MRI-conditional. This is a contraindication for MRI.

## 2020-11-22 NOTE — PROGRESS NOTES
Inpatient Anticoagulation Service Note    Date: 11/22/2020    Reason for Anticoagulation: Bioprosthetic Valve Replacement, Atrial Fibrillation(Bioprosthetic Mitral Valve Replacement, Hx VTE)   Target INR: 2.5 to 3.5(Continuation of outpatient INR Goal)  [id number] VASc Score: 6  HAS-BLED Score: 1   Hemoglobin Value: 14  Hematocrit Value: 42.6  Lab Platelet Value: (!) 137    INR from last 7 days     Date/Time INR Value    11/22/20 0012  (!) 2.44    11/21/20 0524  (!) 2.92    11/20/20 0941  (!) 3.37        Dose from last 7 days     Date/Time Dose (mg)    11/22/20 1200  5    11/21/20 0800  5    11/20/20 1450  2        Average Dose (mg): (Reported home dose 5 mg daily except 2 mg on Friday)  Significant Interactions: (allopurinol)  Bridge Therapy: No     Reversal Agent Administered: Not Applicable    Comments:   66 y/o m w/ PMHx CAD, paroxysmal atrial fibrillation, history of mitral valve replacement x2 on anticoagulation with Coumadin, pacemaker in place, along with history of lower extremity DVT, hypertension, COPD, and obesity. DDI noted. No documented signs of overt bleeding.      Plan:  Warfarin 5mg po tonight. INR in AM.  Education Material Provided?: No(Chronic Coumadin Patient)  Pharmacist suggested discharge dosing: Warfarin 5 mg daily except 2 mg on Friday. Follow up with anticoagulation clinic after discharge.      Soham Rodriguez, PharmD, BCPS

## 2020-11-22 NOTE — THERAPY
"Physical Therapy   Initial Evaluation     Patient Name: Morales Olivier  Age:  65 y.o., Sex:  male  Medical Record #: [id number]  Today's Date: 11/22/2020     Precautions: Fall Risk (d/t intermittent dizziness)    Assessment  Patient is a 65 y.o. male presenting with c/o intermittent dizziness. Pt reports dizziness and \"spinning inside my head\" and denies room spinning. Pt reports incr dizziness when moving from supine > sit or sit > stand. Denies dizziness when rolling. Slight incr in symptoms with cervical extension and flexion. When dizziness occurs, lasts no greater than 10 seconds. Pt was able to complete smooth pursuit, saccades, VOR x1, cervical ROM without nystagmus or c/o symptoms increasing. Assessed vestibular system with colt-hallpike and roll test, no nystagmus, dizziness feelings lasting no > 10 seconds and no significant difference R v L. Pt was then able to complete functional transfers and ambulate with SPC with SPV, no LOB. Pt was able to look in all directions and turn around while ambulating without LOB or dizziness. Pt did take a few brief standing rest breaks reporting SOB and lightheadedness. Educated on safety strategies for return home including slow transitional movements and appropriate rest breaks. While pt appears functionally capable of return home from PT standpoint and did not demo overt vestibular deficits at this time, pt reports concerns with return home alone d/t fear of falling as he is worried he will still be dizzy. Defer to medical team. Pt would benefit from home health PT for continued education and to ensure safe transition home. Recommend a shower chair for added safety. Recommend pt continue to ambulate and mobilize with nursing staff to prevent deconditioning. Patient will not be actively followed for physical therapy services at this time, however may be seen if requested by physician for 1 more visit within 30 days to address any discharge or equipment needs. "     Plan  Recommend Physical Therapy for Evaluation only   DC Equipment Recommendations: Tub / Shower Seat  Discharge Recommendations: Recommend home health for continued physical therapy services        11/22/20 3811   Prior Living Situation   Prior Services Housekeeping / Homemaker Services;Meals on Wheels   Housing / Facility 1 Story Apartment / Condo   Steps Into Home 0   Steps In Home 0   Equipment Owned Single Point Cane;Front-Wheel Walker   Lives with -  Alone and Able to Care For Self   Comments independent with mobility and ADLs   Prior Level of Functional Mobility   Bed Mobility Independent   Transfer Status Independent   Ambulation Independent   Distance Ambulation (Feet) limited community   Assistive Devices Used Single Point Cane   Vision   Vision Comments no nystagmus with smooth pursuit, saccades, VOR x1, cervical ROM all directions. negative results with roll test and colt hallpike   Balance Assessment   Sitting Balance (Static) Normal   Sitting Balance (Dynamic) Normal   Standing Balance (Static) Good   Standing Balance (Dynamic) Fair +   Weight Shift Sitting Good   Weight Shift Standing Good   Comments standing with SPC   Gait Analysis   Gait Level Of Assist Supervised   Assistive Device Single Point Cane   Distance (Feet) 150   Deviation Increased Base Of Support   Weight Bearing Status no restrictions   Comments no LOB. did stop a few times breifly d/t c/o SOB and dizziness - dizziness lasting < 10 seconds   Bed Mobility    Supine to Sit Supervised   Sit to Supine Supervised   Scooting Supervised   Rolling Supervised   Comments HOB flat   Functional Mobility   Sit to Stand Supervised

## 2020-11-22 NOTE — PROGRESS NOTES
Assumed patient care. Bedside report received. Patient's plan of care reviewed. Patient found sitting up in bed, A&Ox 4. On RA. VSS, except . Educated patient on using call light to call for needs. No signs of distress, 8/10 mid back pain at this time. All needs met. Safety precautions in place. Tele box on. Bed in lowest/locked position. Bed alarm off, pt calls appropriately. Call light and personal belongings within reach. Will continue to monitor.

## 2020-11-23 PROBLEM — R07.9 PAIN IN THE CHEST: Status: RESOLVED | Noted: 2019-09-25 | Resolved: 2020-01-01

## 2020-11-23 PROBLEM — R42 LIGHTHEADEDNESS: Status: RESOLVED | Noted: 2020-01-01 | Resolved: 2020-01-01

## 2020-11-23 NOTE — DISCHARGE SUMMARY
"Discharge Summary    CHIEF COMPLAINT ON ADMISSION  Chief Complaint   Patient presents with   • Chest Pain     Pt BIBA c/o dizziness and chest \"lightness\" pt is paced w/ some capture CABG 17' pacer 18'. both done at [facility].    • Dizziness       Reason for Admission  Dizziness     Admission Date  11/20/2020    CODE STATUS  Full Code    HPI & HOSPITAL COURSE  This is a 65 y.o. male here with above medical issues. Patient has an extensive  cardiac history including MV and AV repair. He was admitted to tele. ECHO showed no issues with valves. His INR remained therapeutic. No issues on tele other than mild a fib RVR. NM cardiac stress test negative. MRI contraindicated given incompatible PPM. Patient was evaluated by PT and negative for both Katy-Hallpike and Epley. Patient's dizziness/vertigo slowly improved and suspect possible BPPB versus mild orthostatic hypotension. He was cleared by PT for HH which has bene ordered along with tub bench. Additionally, referral was placed to [facility] clinic for coumadin management. He did have a bout of chest pain with no interval changes on EKG and normal troponins.   No notes on file    Therefore, he is discharged in fair and stable condition to home with close outpatient follow-up.      Discharge Date  11/23/2020    FOLLOW UP ITEMS POST DISCHARGE  F/u with his PCP in 1 week  Go to [facility] clinic on Wednesday, 11/25 for INR check    DISCHARGE DIAGNOSES  Principal Problem (Resolved):    Pain in the chest POA: Yes  Active Problems:    Paroxysmal atrial fibrillation (HCC) POA: Yes    Acquired circulating anticoagulants (HCC) POA: Yes    H/O mitral valve replacement POA: Yes    Essential hypertension, benign POA: Yes    Benign prostatic hyperplasia without lower urinary tract symptoms POA: Yes    Obesity (BMI 30-39.9) POA: Yes    Type 2 diabetes mellitus without complication, without long-term current use of insulin (HCC) POA: Yes    COPD (chronic obstructive pulmonary disease) (HCC) " POA: Yes  Resolved Problems:    Lightheadedness POA: Yes      FOLLOW UP  Future Appointments   Date Time Provider Department Center   12/10/2020 10:45 AM [facility] EXAM 4 VMED None     Yelena Haney P.A.-C.  [address]  [place] [address]  [phone]    Schedule an appointment as soon as possible for a visit  Make an apointment to be seen within 7 days post discharge    [facility]  [address]  [address]  [facility] [id number] [phone]          MEDICATIONS ON DISCHARGE     Medication List      CHANGE how you take these medications      Instructions   * warfarin 2 MG Tabs  What changed:   · when to take this  · additional instructions  Commonly known as: COUMADIN   Doctor's comments: This rx was submitted by a pharmacist working under a collaborative practice agreement.  Take 1 Tab by mouth every day.  Dose: 2 mg     * warfarin 5 MG Tabs  What changed:   · how much to take  · how to take this  · when to take this  · additional instructions  Commonly known as: COUMADIN   Take 1 tab by mouth daily or as directed by anticoagulation clinic         * This list has 2 medication(s) that are the same as other medications prescribed for you. Read the directions carefully, and ask your doctor or other care provider to review them with you.            CONTINUE taking these medications      Instructions   acetaminophen 500 MG Tabs  Commonly known as: TYLENOL   Take 1,000 mg by mouth every 6 hours as needed.  Dose: 1,000 mg     albuterol 108 (90 Base) MCG/ACT Aers inhalation aerosol   Inhale 2 Puffs by mouth every 6 hours as needed for Shortness of Breath.  Dose: 2 Puff     allopurinol 100 MG Tabs  Commonly known as: ZYLOPRIM   Take 100 mg by mouth every morning.  Dose: 100 mg     cetirizine 10 MG Tabs  Commonly known as: ZYRTEC   Take 10 mg by mouth every day.  Dose: 10 mg     lisinopril 2.5 MG Tabs  Commonly known as: PRINIVIL   Take 2.5 mg by mouth every day.  Dose: 2.5 mg     metFORMIN 500 MG  Tabs  Commonly known as: GLUCOPHAGE   Take 500 mg by mouth 2 times a day, with meals.  Dose: 500 mg     metoprolol SR 50 MG Tb24  Commonly known as: TOPROL XL   Take 50 mg by mouth every morning.  Dose: 50 mg     tamsulosin 0.4 MG capsule  Commonly known as: FLOMAX   Doctor's comments: PT TO SEE PCP FOR FURTHER REFILLS  TAKE 1 CAPSULE BY MOUTH EVERY DAY            Allergies  No Known Allergies    DIET  Orders Placed This Encounter   Procedures   • Diet Order Diet: Consistent CHO (Diabetic); Second Modifier: (optional): Cardiac     Standing Status:   Standing     Number of Occurrences:   1     Order Specific Question:   Diet:     Answer:   Consistent CHO (Diabetic) [4]     Order Specific Question:   Second Modifier: (optional)     Answer:   Cardiac [6]       ACTIVITY  As tolerated.  Weight bearing as tolerated    CONSULTATIONS  none    PROCEDURES  NM-CARDIAC STRESS TEST   Final Result      EC-ECHOCARDIOGRAM COMPLETE W/O CONT   Final Result      CT-HEAD W/O   Final Result      1.  Cerebral atrophy.      2.  Otherwise, Head CT without contrast within normal limits. No evidence of acute cerebral infarction, hemorrhage or mass lesion.      DX-CHEST-PORTABLE (1 VIEW)   Final Result      1.  Minimal RIGHT pleural fluid versus pleural reactive change.   2.  No pneumonia or pulmonary edema.   3.  Moderate cardiomegaly, stable.            LABORATORY  Lab Results   Component Value Date    SODIUM 136 11/21/2020    POTASSIUM 4.3 11/21/2020    CHLORIDE 102 11/21/2020    CO2 24 11/21/2020    GLUCOSE 131 (H) 11/21/2020    BUN 23 (H) 11/21/2020    CREATININE 1.16 11/21/2020    CREATININE 1.4 04/27/2009        Lab Results   Component Value Date    WBC 4.9 11/21/2020    HEMOGLOBIN 14.0 11/21/2020    HEMATOCRIT 42.6 11/21/2020    PLATELETCT 137 (L) 11/21/2020        Total time of the discharge process exceeds 32 minutes.

## 2020-11-23 NOTE — DISCHARGE PLANNING
Care Transition Team Assessment    Domo LIVE sister, [phone]    RN CM spoke with patient at the bedside to complete transition assessment.  Patient verified in the information on face sheet as up to date.  Patient lives in a first floor apartment here in [place].  He is independent for all his ADLs/IADLs, except he has housekeeping services that come in 2 times a week.  He was agreeable to a HH referral being sent to [facility].  He uses the pharmacy @ [facility] for all his medications, and that is where he PCP is located at [facility].  He does use a cane when he leaves the house.  He denies any other needs at this time.    PCF completed and faxed to ARMANDO Alonso, for [facility].    DC Plan:  Home w/ HH    Information Source  Orientation : Oriented x 4  Information Given By: Patient  Who is responsible for making decisions for patient? : Patient    Readmission Evaluation  Is this a readmission?: No    Elopement Risk  Legal Hold: No  Ambulatory or Self Mobile in Wheelchair: No-Not an Elopement Risk    Interdisciplinary Discharge Planning  Primary Care Physician: Yelena Haney PA-C  Lives with - Patient's Self Care Capacity: Alone and Able to Care For Self  Patient or legal guardian wants to designate a caregiver: No  Support Systems: Friends / Neighbors, Family Member(s)  Housing / Facility: 1 Story Apartment / Condo  Do You Take your Prescribed Medications Regularly: Yes  Able to Return to Previous ADL's: Yes  Mobility Issues: No  Prior Services: Housekeeping / Homemaker Services  Patient Prefers to be Discharged to:: Home w/ HH  Assistance Needed: No  Durable Medical Equipment: Other - Specify(Cane)    Discharge Preparedness  What is your plan after discharge?: Home health care  Prior Functional Level: Ambulatory, Independent with Activities of Daily Living, Independent with Medication Management  Difficulity with ADLs: None  Difficulity with IADLs: Laundry  Difficulity with IADL Comments: has  housekeeping services that come in to assist    Functional Assesment  Prior Functional Level: Ambulatory, Independent with Activities of Daily Living, Independent with Medication Management    Finances  Financial Barriers to Discharge: No  Prescription Coverage: Yes    Vision / Hearing Impairment  Right Eye Vision: Impaired, Patient Declines to Wear Visual Aid  Left Eye Vision: Impaired, Patient Declines to Wear Visual Aid              Domestic Abuse  Have you ever been the victim of abuse or violence?: No  Physical Abuse or Sexual Abuse: No  Verbal Abuse or Emotional Abuse: No  Possible Abuse/Neglect Reported to:: Not Applicable    Psychological Assessment  History of Substance Abuse: None  History of Psychiatric Problems: No  Non-compliant with Treatment: No  Newly Diagnosed Illness: No    Discharge Risks or Barriers  Discharge risks or barriers?: No    Anticipated Discharge Information  Discharge Disposition: D/T to home under A care in anticipation of covered skilled care (06)  Discharge Address: [facility] [address]   [place] [address]  Discharge Contact Phone Number: [phone]

## 2020-11-23 NOTE — FACE TO FACE
Face to Face Note  -  Durable Medical Equipment    Keo Hendricks M.D. - NPI: [id number]  I certify that this patient is under my care and that they have had a durable medical equipment(DME)face to face encounter by myself that meets the physician DME face-to-face encounter requirements with this patient on:    Date of encounter:   Patient:                    MRN:                       YOB: 2021  Morales Olivier  [id number]  1955     The encounter with the patient was in whole, or in part, for the following medical condition, which is the primary reason for durable medical equipment:  Other - dizziness    I certify that, based on my findings, the following durable medical equipment is medically necessary:  Other DME Equipment - tub/shower seat.    HOME O2 Saturation Measurements:(Values must be present for Home Oxygen orders)         ,     ,         My Clinical findings support the need for the above equipment due to:  Other - dizziness while walking, unstable gait    Supporting Symptoms: dizziness while ambulating     ------------------------------------------------------------------------------------------------------------------    Face to Face Supporting Documentation - Home Health    The encounter with this patient was in whole or in part the primary reason for home health admission.    Date of encounter:   Patient:                    MRN:                       YOB: 2021  Morales Olivier  [id number]  1955     Home health to see patient for:  Skilled Nursing care for assessment, interventions & education, Physical Therapy evaluation and treatment and Occupational therapy evaluation and treatment    Skilled need for:  New Onset Medical Diagnosis dizziness    Skilled nursing interventions to include:  Comment: needs help with therapies    Homebound evidenced status by:  Need the aid of supportive devices such as crutches, canes, wheelchairs or walkers.  Leaving home must require a considerable and taxing effort. There must exist a normal inability to leave the home.    Community Physician to provide follow up care: Yelena Haney P.A.-C.     Optional Interventions    Wound information & treatment:    Home Infusion Therapy orders:    Line/Drain/Airway:    I certify the face to face encounter for this home care referral meets the CMS requirements and the encounter/clinical assessment with the patient was, in whole, or in part, for the medical condition(s) listed above, which is the primary reason for home health care. Based on my clinical findings: the service(s) are medically necessary, support the need for home health care, and the homebound criteria are met.  I certify that this patient has had a face to face encounter by myself.  Keo Hendricks M.D. - MARVAI: [phone]    *Debility, frailty and advanced age in the absence of an acute deterioration or exacerbation of a condition do not qualify a patient for home health.

## 2020-11-23 NOTE — PROGRESS NOTES
Assumed patient care. Bedside report received. Patient's plan of care reviewed. Patient found sitting up in bed, A&Ox 4. On RA. VSS, except . Educated patient on using call light to call for needs. No signs of distress, complains of 8/10 back pain at this time. All needs met. Safety precautions in place. Tele box off, pt calls appriopriately. Bed in lowest/locked position. Bed alarm on. Call light and personal belongings within reach. Will continue to monitor.

## 2020-11-23 NOTE — DISCHARGE INSTRUCTIONS
Discharge Instructions    Discharged to home by taxi with self. Discharged via wheelchair, hospital escort: Yes.  Special equipment needed: Not Applicable    Be sure to schedule a follow-up appointment with your primary care doctor or any specialists as instructed.     Discharge Plan:   Diet Plan: Discussed  Activity Level: Discussed  Confirmed Symptoms Management: Discussed  Medication Reconciliation Updated: Yes  Influenza Vaccine Indication: Patient Refuses    I understand that a diet low in cholesterol, fat, and sodium is recommended for good health. Unless I have been given specific instructions below for another diet, I accept this instruction as my diet prescription.   Other diet: Diabetic healthy diet    Special Instructions: None    · Is patient discharged on Warfarin / Coumadin?   Yes    You are receiving the drug warfarin. Please understand the importance of monitoring warfarin with scheduled PT/INR blood draws.  Follow-up with a call to your personal Doctor's office in 3 days to schedule a PT/INR. .    IMPORTANT: HOW TO USE THIS INFORMATION:  This is a summary and does NOT have all possible information about this product. This information does not assure that this product is safe, effective, or appropriate for you. This information is not individual medical advice and does not substitute for the advice of your health care professional. Always ask your health care professional for complete information about this product and your specific health needs.      WARFARIN - ORAL (WARF-uh-rin)      COMMON BRAND NAME(S): Coumadin      WARNING:  Warfarin can cause very serious (possibly fatal) bleeding. This is more likely to occur when you first start taking this medication or if you take too much warfarin. To decrease your risk for bleeding, your doctor or other health care provider will monitor you closely and check your lab results (INR test) to make sure you are not taking too much warfarin. Keep all medical  "and laboratory appointments. Tell your doctor right away if you notice any signs of serious bleeding. See also Side Effects section.      USES:  This medication is used to treat blood clots (such as in deep vein thrombosis-DVT or pulmonary embolus-PE) and/or to prevent new clots from forming in your body. Preventing harmful blood clots helps to reduce the risk of a stroke or heart attack. Conditions that increase your risk of developing blood clots include a certain type of irregular heart rhythm (atrial fibrillation), heart valve replacement, recent heart attack, and certain surgeries (such as hip/knee replacement). Warfarin is commonly called a \"blood thinner,\" but the more correct term is \"anticoagulant.\" It helps to keep blood flowing smoothly in your body by decreasing the amount of certain substances (clotting proteins) in your blood.      HOW TO USE:  Read the Medication Guide provided by your pharmacist before you start taking warfarin and each time you get a refill. If you have any questions, ask your doctor or pharmacist. Take this medication by mouth with or without food as directed by your doctor or other health care professional, usually once a day. It is very important to take it exactly as directed. Do not increase the dose, take it more frequently, or stop using it unless directed by your doctor. Dosage is based on your medical condition, laboratory tests (such as INR), and response to treatment. Your doctor or other health care provider will monitor you closely while you are taking this medication to determine the right dose for you. Use this medication regularly to get the most benefit from it. To help you remember, take it at the same time each day. It is important to eat a balanced, consistent diet while taking warfarin. Some foods can affect how warfarin works in your body and may affect your treatment and dose. Avoid sudden large increases or decreases in your intake of foods high in vitamin K " (such as broccoli, cauliflower, cabbage, brussels sprouts, kale, spinach, and other green leafy vegetables, liver, green tea, certain vitamin supplements). If you are trying to lose weight, check with your doctor before you try to go on a diet. Cranberry products may also affect how your warfarin works. Limit the amount of cranberry juice (16 ounces/480 milliliters a day) or other cranberry products you may drink or eat.      SIDE EFFECTS:  Nausea, loss of appetite, or stomach/abdominal pain may occur. If any of these effects persist or worsen, tell your doctor or pharmacist promptly. Remember that your doctor has prescribed this medication because he or she has judged that the benefit to you is greater than the risk of side effects. Many people using this medication do not have serious side effects. This medication can cause serious bleeding if it affects your blood clotting proteins too much (shown by unusually high INR lab results). Even if your doctor stops your medication, this risk of bleeding can continue for up to a week. Tell your doctor right away if you have any signs of serious bleeding, including: unusual pain/swelling/discomfort, unusual/easy bruising, prolonged bleeding from cuts or gums, persistent/frequent nosebleeds, unusually heavy/prolonged menstrual flow, pink/dark urine, coughing up blood, vomit that is bloody or looks like coffee grounds, severe headache, dizziness/fainting, unusual or persistent tiredness/weakness, bloody/black/tarry stools, chest pain, shortness of breath, difficulty swallowing. Tell your doctor right away if any of these unlikely but serious side effects occur: persistent nausea/vomiting, severe stomach/abdominal pain, yellowing eyes/skin. This drug rarely has caused very serious (possibly fatal) problems if its effects lead to small blood clots (usually at the beginning of treatment). This can lead to severe skin/tissue damage that may require surgery or amputation if left  untreated. Patients with certain blood conditions (protein C or S deficiency) may be at greater risk. Get medical help right away if any of these rare but serious side effects occur: painful/red/purplish patches on the skin (such as on the toe, breast, abdomen), change in the amount of urine, vision changes, confusion, slurred speech, weakness on one side of the body. A very serious allergic reaction to this drug is rare. However, get medical help right away if you notice any symptoms of a serious allergic reaction, including: rash, itching/swelling (especially of the face/tongue/throat), severe dizziness, trouble breathing. This is not a complete list of possible side effects. If you notice other effects not listed above, contact your doctor or pharmacist. In the [place] - Call your doctor for medical advice about side effects. You may report side effects to FDA at 4-402-VMQ-2378. In [place] - Call your doctor for medical advice about side effects. You may report side effects to [facility] [place] at 1-410.508.5280.      PRECAUTIONS:  Before taking warfarin, tell your doctor or pharmacist if you are allergic to it; or if you have any other allergies. This product may contain inactive ingredients, which can cause allergic reactions or other problems. Talk to your pharmacist for more details. Before using this medication, tell your doctor or pharmacist your medical history, especially of: blood disorders (such as anemia, hemophilia), bleeding problems (such as bleeding of the stomach/intestines, bleeding in the brain), blood vessel disorders (such as aneurysms), recent major injury/surgery, liver disease, alcohol use, mental/mood disorders (including memory problems), frequent falls/injuries. It is important that all your doctors and dentists know that you take warfarin. Before having surgery or any medical/dental procedures, tell your doctor or dentist that you are taking this medication and about all the products you use  (including prescription drugs, nonprescription drugs, and herbal products). Avoid getting injections into the muscles. If you must have an injection into a muscle (for example, a flu shot), it should be given in the arm. This way, it will be easier to check for bleeding and/or apply pressure bandages. This medication may cause stomach bleeding. Daily use of alcohol while using this medicine will increase your risk for stomach bleeding and may also affect how this medication works. Limit or avoid alcoholic beverages. If you have not been eating well, if you have an illness or infection that causes fever, vomiting, or diarrhea for more than 2 days, or if you start using any antibiotic medications, contact your doctor or pharmacist immediately because these conditions can affect how warfarin works. This medication can cause heavy bleeding. To lower the chance of getting cut, bruised, or injured, use great caution with sharp objects like safety razors and nail cutters. Use an electric razor when shaving and a soft toothbrush when brushing your teeth. Avoid activities such as contact sports. If you fall or injure yourself, especially if you hit your head, call your doctor immediately. Your doctor may need to check you. The Food & Drug Administration has stated that generic warfarin products are interchangeable. However, consult your doctor or pharmacist before switching warfarin products. Be careful not to take more than one medication that contains warfarin unless specifically directed by the doctor or health care provider who is monitoring your warfarin treatment. Older adults may be at greater risk for bleeding while using this drug. This medication is not recommended for use during pregnancy because of serious (possibly fatal) harm to an unborn baby. Discuss the use of reliable forms of birth control with your doctor. If you become pregnant or think you may be pregnant, tell your doctor immediately. If you are  "planning pregnancy, discuss a plan for managing your condition with your doctor before you become pregnant. Your doctor may switch the type of medication you use during pregnancy. Very small amounts of this medication may pass into breast milk but is unlikely to harm a nursing infant. Consult your doctor before breast-feeding.      DRUG INTERACTIONS:  Drug interactions may change how your medications work or increase your risk for serious side effects. This document does not contain all possible drug interactions. Keep a list of all the products you use (including prescription/nonprescription drugs and herbal products) and share it with your doctor and pharmacist. Do not start, stop, or change the dosage of any medicines without your doctor's approval. Warfarin interacts with many prescription, nonprescription, vitamin, and herbal products. This includes medications that are applied to the skin or inside the vagina or rectum. The interactions with warfarin usually result in an increase or decrease in the \"blood-thinning\" (anticoagulant) effect. Your doctor or other health care professional should closely monitor you to prevent serious bleeding or clotting problems. While taking warfarin, it is very important to tell your doctor or pharmacist of any changes in medications, vitamins, or herbal products that you are taking. Some products that may interact with this drug include: capecitabine, imatinib, mifepristone. Aspirin, aspirin-like drugs (salicylates), and nonsteroidal anti-inflammatory drugs (NSAIDs such as ibuprofen, naproxen, celecoxib) may have effects similar to warfarin. These drugs may increase the risk of bleeding problems if taken during treatment with warfarin. Carefully check all prescription/nonprescription product labels (including drugs applied to the skin such as pain-relieving creams) since the products may contain NSAIDs or salicylates. Talk to your doctor about using a different medication (such " as acetaminophen) to treat pain/fever. Low-dose aspirin and related drugs (such as clopidogrel, ticlopidine) should be continued if prescribed by your doctor for specific medical reasons such as heart attack or stroke prevention. Consult your doctor or pharmacist for more details. Many herbal products interact with warfarin. Tell your doctor before taking any herbal products, especially bromelains, coenzyme Q10, cranberry, danshen, dong quai, fenugreek, garlic, ginkgo biloba, ginseng, and Thong's wort, among others. This medication may interfere with a certain laboratory test to measure theophylline levels, possibly causing false test results. Make sure laboratory personnel and all your doctors know you use this drug.      OVERDOSE:  If overdose is suspected, contact a poison control center or emergency room immediately. [place] residents can call the [place] National Poison Hotline at 1-611.118.4184. [place] residents can call a provincial poison control center. Symptoms of overdose may include: bloody/black/tarry stools, pink/dark urine, unusual/prolonged bleeding.      NOTES:  Do not share this medication with others. Laboratory and/or medical tests (such as INR, complete blood count) must be performed periodically to monitor your progress or check for side effects. Consult your doctor for more details.      MISSED DOSE:  For the best possible benefit, do not miss any doses. If you do miss a dose and remember on the same day, take it as soon as you remember. If you remember on the next day, skip the missed dose and resume your usual dosing schedule. Do not double the dose to catch up because this could increase your risk for bleeding. Keep a record of missed doses to give to your doctor or pharmacist. Contact your doctor or pharmacist if you miss 2 or more doses in a row.      STORAGE:  Store at room temperature away from light and moisture. Do not store in the bathroom. Keep all medications away from children and pets.  Do not flush medications down the toilet or pour them into a drain unless instructed to do so. Properly discard this product when it is  or no longer needed. Consult your pharmacist or local waste disposal company for more details about how to safely discard your product.      MEDICAL ALERT:  Your condition and medication can cause complications in a medical emergency. For information about enrolling in MedicAlert, call 1-265.538.6435 ([place]) or 1-495.982.4298 ([place]).      Information last revised 2010 Copyright(c)  [employer]             Depression / Suicide Risk    As you are discharged from this [facility] facility, it is important to learn how to keep safe from harming yourself.    Recognize the warning signs:  · Abrupt changes in personality, positive or negative- including increase in energy   · Giving away possessions  · Change in eating patterns- significant weight changes-  positive or negative  · Change in sleeping patterns- unable to sleep or sleeping all the time   · Unwillingness or inability to communicate  · Depression  · Unusual sadness, discouragement and loneliness  · Talk of wanting to die  · Neglect of personal appearance   · Rebelliousness- reckless behavior  · Withdrawal from people/activities they love  · Confusion- inability to concentrate     If you or a loved one observes any of these behaviors or has concerns about self-harm, here's what you can do:  · Talk about it- your feelings and reasons for harming yourself  · Remove any means that you might use to hurt yourself (examples: pills, rope, extension cords, firearm)  · Get professional help from the community (Mental Health, Substance Abuse, psychological counseling)  · Do not be alone:Call your Safe Contact- someone whom you trust who will be there for you.  · Call your local CRISIS HOTLINE 481-1322 or 806-513-4361  · Call your local Children's Mobile Crisis Response Team [place] (456) 658-8512 or  www.[employer]  · Call the toll free National Suicide Prevention Hotlines   · National Suicide Prevention Lifeline 662-733-WWNA (1967)  · National Hope Line Network 800-SUICIDE (318-9003)

## 2020-11-23 NOTE — PROGRESS NOTES
Notified hospitalist, Rosalia Womack, of pt's severe chest pain and that I administered 1mg morphine as ordered. Pt is still feeling 3/10 left chest pain while lying flat, watching tv. Itching has resolved. EKG ordered.

## 2020-11-23 NOTE — PROGRESS NOTES
Pt complains of itching all over body and severe chest pain. Gave benadryl and morphine as ordered. Resolved both issues. Will continue to monitor.

## 2020-11-23 NOTE — DISCHARGE PLANNING
@[id number]  Ag[facility]/Facility Name: [facility]  Outcome: Left message, awaiting call back.    Received Choice form at [phone]  Agency/Facility Name: [facility]  Referral sent per Choice form @ [phone]

## 2020-12-12 NOTE — PROGRESS NOTES
OP   Telephone Anticoagulation Service Note      Anticoagulation Summary  As of 12/11/2020    INR goal:  2.5-3.5   TTR:  57.4 % (1.3 y)   INR used for dosing:  3.40 (12/11/2020)   Warfarin maintenance plan:  2 mg (2 mg x 1) every Fri; 5 mg (5 mg x 1) all other days   Weekly warfarin total:  32 mg   Plan last modified:  Yelena Carreon AHasmukhPPASQUALE (5/28/2020)   Next INR check:  12/18/2020   Priority:  Acute   Target end date:  Indefinite    Indications    H/O mitral valve replacement [Z95.2]  Paroxysmal atrial fibrillation (HCC) [I48.0]  Deep vein thrombosis (HCC) [I82.409] [I82.409]             Anticoagulation Episode Summary     INR check location:      Preferred lab:      Send INR reminders to:      Comments:  [facility]      Anticoagulation Care Providers     Provider Role Specialty Phone number    Sumanth Yancey M.D. Referring Cardiology [phone]    [facility] Responsible  [phone]      ______________________________________________  12/11 - [facility]  INR  Received: Today  Message Contents   Nuris Carlos sent to [facility] Anticoag Pool             inr 3.4    ______________________________________________  Anticoagulation Patient Findings    Attempted to contact patient in regards to INR results received but n/a and VM not set up. Will try pt again later.    12/15/20  10:11am

## 2020-12-15 PROBLEM — L03.116 CELLULITIS OF LEFT LOWER EXTREMITY: Status: ACTIVE | Noted: 2020-01-01

## 2020-12-15 NOTE — ED PROVIDER NOTES
ED Provider Note    Scribed for Alcon Leos M.D. by Reese Valentine. 12/15/2020, 12:41 PM.    Primary care provider: Yelena Haney P.A.-C.  Means of arrival: EMS    History obtained from: Patient  History limited by: None    CHIEF COMPLAINT  Chief Complaint   Patient presents with   • Leg Swelling     Left leg swelling X1 week, redness and old scabs   • Chest Pain     intermittent, non radiating       HPI  Morales Olivier is a 65 y.o. male who presents to the Emergency Department via EMS for evaluation of left leg swelling onset one week ago. Patient states that his symptoms began when his left leg was itchy about a week ago. He scratched it to alleviate it. Despite scratching his leg, the itchiness persisted. He states that though he had stopped itching his leg due to the increased redness that resulted from his itching, the redness would not resolve. His redness and swelling have been progressively worsening for four days, prompting him to present to the ED today. Patient has associated redness, back pain, and chest pain. Patient describes his chest pain as intermittent, lasting for about 15-20 seconds. Denies any cough, fever, chills, shortness of breath, or palpitations. No alleviating or exacerbating factors reported.      REVIEW OF SYSTEMS  Review of Systems   Constitutional: Negative.    HENT: Negative.    Eyes: Negative.    Respiratory: Negative.    Cardiovascular: Positive for chest pain and leg swelling. Negative for palpitations, orthopnea, claudication and PND.   Gastrointestinal: Negative.    Genitourinary: Negative.    Musculoskeletal: Positive for back pain. Negative for falls, joint pain, myalgias and neck pain.   Skin: Positive for itching. Negative for rash.        Positive for redness on lower left extremity.    Neurological: Negative.    Endo/Heme/Allergies: Negative.    Psychiatric/Behavioral: Negative.    All other systems reviewed and are negative.      PAST MEDICAL HISTORY   has  a past medical history of Atrial fibrillation (HCC), Back pain, Bronchitis, CAD (coronary artery disease), COPD (chronic obstructive pulmonary disease) (HCC), Gout, Heart valve disease, Hypertension, Kidney stone, Obesity, Open wound (2009), Pacemaker, Personal history of venous thrombosis and embolism (), PVC (premature ventricular contraction) (2019), Renal disorder, and Type II or unspecified type diabetes mellitus without mention of complication, not stated as uncontrolled.    SURGICAL HISTORY   has a past surgical history that includes mitral valve replace (3/14/08); maze procedure (3/14/08); angiogram (7/3/2009); angiogram (2009); cath removal (2009); thrombectomy (2009); embolectomy (2009); irrigation & debridement general (2009); wide excision (2009); other cardiac surgery (); other abdominal surgery; mitral valve replace (3/8/2017); lopez (3/8/2017); and aortic valve replacement.    SOCIAL HISTORY  Social History     Tobacco Use   • Smoking status: Former Smoker     Packs/day: 2.50     Years: 9.00     Pack years: 22.50     Types: Cigarettes     Quit date: 1981     Years since quittin.9   • Smokeless tobacco: Never Used   Substance Use Topics   • Alcohol use: No   • Drug use: Yes     Types: Marijuana      Social History     Substance and Sexual Activity   Drug Use Yes   • Types: Marijuana       FAMILY HISTORY  Family History   Problem Relation Age of Onset   • Diabetes Mother    • Lung Disease Father    • Heart Disease Neg Hx        CURRENT MEDICATIONS  Home Medications     Reviewed by Richie Hubbard (Pharmacy Intern) on 12/15/20 at 1617  Med List Status: Complete   Medication Last Dose Status   acetaminophen (TYLENOL) 500 MG Tab 2020 Active   allopurinol (ZYLOPRIM) 100 MG Tab 12/15/2020 Active   cetirizine (ZYRTEC) 10 MG Tab 12/15/2020 Active   lisinopril (PRINIVIL) 2.5 MG Tab 2020 Active   metFORMIN (GLUCOPHAGE) 500 MG Tab 12/15/2020  "Active   metoprolol SR (TOPROL XL) 50 MG TABLET SR 24 HR 12/15/2020 Active   tamsulosin (FLOMAX) 0.4 MG capsule 12/14/2020 Active   warfarin (COUMADIN) 2 MG Tab 12/11/2020 Active   warfarin (COUMADIN) 5 MG Tab 12/14/2020 Active                ALLERGIES  No Known Allergies    PHYSICAL EXAM  VITAL SIGNS: /60   Pulse (!) 132   Temp 37.4 °C (99.3 °F) (Oral)   Resp (!) 22   Ht 1.778 m (5' 10\")   Wt 118.4 kg (261 lb)   SpO2 92%   BMI 37.45 kg/m²     Constitutional:  No acute distress  HENT: Moist mucous membranes  Eyes: No conjunctivitis or icterus  Neck: trachea is midline, no palpable thyroid  Lymphatic: No cervical lymphadenopathy. There is lymphangitis on the inner aspect of the left thigh.   Cardiovascular: Tachycardic. Regular rhythm, no murmurs  Thorax & Lungs: Normal breath sounds, no rhonchi  Abdomen: Obese. Good bowel sounds. Soft, Non-tender  Skin:. Redness on left lower extremity   Extremities:  There is swelling on the left leg with ecchymosis with excoriation.   Vascular: symmetric radial pulse  Neurologic: Normal gross motor    LABS  Labs Reviewed   LACTIC ACID - Abnormal; Notable for the following components:       Result Value    Lactic Acid 2.9 (*)     All other components within normal limits   CBC WITH DIFFERENTIAL - Abnormal; Notable for the following components:    WBC 12.9 (*)     RBC 4.58 (*)     RDW 51.0 (*)     Platelet Count 127 (*)     Neutrophils-Polys 78.10 (*)     Lymphocytes 8.60 (*)     Neutrophils (Absolute) 10.08 (*)     Monos (Absolute) 1.50 (*)     All other components within normal limits   COMP METABOLIC PANEL - Abnormal; Notable for the following components:    Sodium 131 (*)     Glucose 144 (*)     All other components within normal limits   URINALYSIS - Abnormal; Notable for the following components:    Ketones Trace (*)     All other components within normal limits    Narrative:     Indication for culture:->Evaluation for sepsis without a  clear source of infection " "  PROBRAIN NATRIURETIC PEPTIDE, NT - Abnormal; Notable for the following components:    NT-proBNP 548 (*)     All other components within normal limits   TROPONIN - Abnormal; Notable for the following components:    Troponin T 23 (*)     All other components within normal limits   PROTHROMBIN TIME - Abnormal; Notable for the following components:    PT 23.0 (*)     INR 1.97 (*)     All other components within normal limits    Narrative:     Indicate which anticoagulants the patient is on:->UNKNOWN   ESTIMATED GFR - Abnormal; Notable for the following components:    GFR If Non  54 (*)     All other components within normal limits   URINE CULTURE(NEW)    Narrative:     Indication for culture:->Evaluation for sepsis without a  clear source of infection   BLOOD CULTURE    Narrative:     Per Hospital Policy: Only change Specimen Src: to \"Line\" if  specified by physician order.   BLOOD CULTURE    Narrative:     Per Hospital Policy: Only change Specimen Src: to \"Line\" if  specified by physician order.   COVID/SARS COV-2   LACTIC ACID   LACTIC ACID     All labs reviewed by me.    EKG Interpretation  Interpreted by me.  Rate 112  QTc:492  QRS:82  Atrial fibrillation 1, 12.   Minimal ST depression anteriorly and laterally, and lateral limb leads.   No change from previous.     RADIOLOGY  US-EXTREMITY VENOUS LOWER UNILAT LEFT   Final Result      DX-CHEST-PORTABLE (1 VIEW)   Final Result      1.  There is an enlarged cardiac silhouette with probable mild vascular congestion and right lower lobe atelectasis with a trace of right pleural fluid.        The radiologist's interpretation of all radiological studies have been reviewed by me.    COURSE & MEDICAL DECISION MAKING  Pertinent Labs & Imaging studies reviewed. (See chart for details)    12:41 PM - Patient seen and examined at bedside. Patient's in-room monitor shows a blood pressure of 106/60. I discussed my plan of care with the patient, which includes " obtaining lab work and imaging for further evaluation. Patient understands and verbalizes agreement to plan of care. Ordered DX-chest, EKG, lactic acid, CBC with diff, CMP, UA, Urine culture, and blood culture to evaluate his symptoms. The differential diagnoses include but are not limited to: deep vein thrombosis vs cellulitis      1:08 PM - Patient was reevaluated at bedside. Patient will be treated with LR infusion bolus. Ordered US-Extremity venous lower unilateral left and Prothrombin Time for further evaluation.     2:44 PM - Patient was reevaluated at bedside. Discussed lab and radiology results with the patient and informed them that the ultrasound shows no evidence of a DVT. I informed him of the plan of care, which includes starting him on antibiotics as well as admitting him into the hospital for observation. Patient understands and verbalizes agreement to plan of care.     2:47 PM - Paged Hospitalist    3:05 PM - Hospitalist responded. I discussed the patient's case and the above findings with Dr. Sultana (Hospitalist) who agrees to evaluate the patient for hospitalization.     HYDRATION: Based on the patient's presentation of Hypotension the patient was given IV fluids. IV Hydration was used because oral hydration was not adequate alone. Upon recheck following hydration, the patient was improved.      PPE Note: I personally donned full PPE for all patient encounters during this visit, including being clean-shaven with an N95 respirator mask, gloves, and goggles.     Scribe remained outside the patient's room and did not have any contact with the patient for the duration of patient encounter.     Medical Decision Making:   The patient was given a liter of lactated Ringer's for his relative hypotension for his age.  He was also given fluid for lactic acidosis of 2.9.  This did help his blood pressure.  She had no clot noted on the Doppler study.  I do think he has lymphangitis appears to be going up his leg  have given him a dose of Unasyn.  Of contacted the hospitalist for admission.    I spent more than 30 minutes of critical care time with this patient    DISPOSITION:  Patient will be hospitalized by Dr. Sultana in guarded condition.    FINAL IMPRESSION  1. Cellulitis of left lower extremity    2. Chest pain, unspecified type    3. Lactic acidosis          Reese JARRETT (Scribe), am scribing for, and in the presence of, Alcon Leos M.D..    Electronically signed by: Reese Valentine (Scribe), 12/15/2020    Alcon JARRETT M.D. personally performed the services described in this documentation, as scribed by Reese Valentine in my presence, and it is both accurate and complete.    C    The note accurately reflects work and decisions made by me.  Alcon Leos M.D.  12/15/2020  6:17 PM

## 2020-12-15 NOTE — ED TRIAGE NOTES
"Chief Complaint   Patient presents with   • Leg Swelling     Left leg swelling X1 week, redness and old scabs   • Chest Pain     intermittent, non radiating     PT BIB [facility] from home with above complaint.  Pt reports increased left leg swelling over past week, pt reports started itching about a week about and small sores formed.  Pt also states he has had intermitten chest pain over past couple of days.  Pt given 300mcg fentanyl and 8mg Zofran by EMS PTA. Pt reports chronic pain in low back still 9/10.      /60   Pulse (!) 132   Temp 37.4 °C (99.3 °F) (Oral)   Resp (!) 22   Ht 1.778 m (5' 10\")   Wt 118.4 kg (261 lb)   SpO2 92%   BMI 37.45 kg/m²     "

## 2020-12-16 PROBLEM — A41.9 SEVERE SEPSIS (HCC): Status: ACTIVE | Noted: 2020-01-01

## 2020-12-16 PROBLEM — R65.20 SEVERE SEPSIS (HCC): Status: ACTIVE | Noted: 2020-01-01

## 2020-12-16 NOTE — ED NOTES
Report rec'd from OSWALD Johnson.  Pt HR continues to be elevated, 2nd dose of metoprolol given to MAR.    Pt also medicated per MAR for pain.

## 2020-12-16 NOTE — PROGRESS NOTES
Inpatient Anticoagulation Service Note    Date: 12/15/2020    Reason for Anticoagulation: Aortic Mechanical Valve Replacement  , Mechanical Mitral Valve Replacement  , Deep Vein Thrombosis   Target INR: 2.5 to 3.5         Hemoglobin Value: 15  Hematocrit Value: 43.7  Lab Platelet Value: (!) 127    INR from last 7 days     Date/Time INR Value    12/15/20 1220  (!) 1.97        Dose from last 7 days     Date/Time Dose (mg)    12/15/20 1842  7.5        Average Dose (mg): (home dose of 2 mg Friday; 5 mg all other days)  Significant Interactions: Antibiotics  Bridge Therapy: No  Reversal Agent Administered: Not Applicable    Comments: Patient resumed on home warfarin for hx of mitral/aortic valve replacement, afib and DVT hx. Patient is follow by [facility], with a target INR of 2.5-3.5. Patient's H/H wnl with no overt bleeding noted by nursing or providers. DDI with abx noted however, do not anticipate that this interaction will have an acute effect on INR. INR currently sub-therapeutic. Plan to bolus patient 7.5 mg today (home dose normally 5 mg) with repeat INR in AM.    Plan:  Warfarin 7.5 mg x 1 dose, repeat INR in AM  Education Material Provided?: No(chronic patient)  Pharmacist suggested discharge dosing: home regimen of 2 mg on Friday's; 5 mg all other days. Follow up INR within 48-72 hours of discharge.     Hugh Bullard, PharmD

## 2020-12-16 NOTE — PROGRESS NOTES
"Pharmacy Kinetics 65 y.o. male on vancomycin day # 1   2020    Currently on Vancomycin loading dose 3 g iv x1   Provider specified end date: TBD    Indication for Treatment: SSTI, S. aureus bacteremia     Pertinent history per medical record: Admitted on 12/15/2020 for cellulitis of lower leg and chest pain. Patient was admitted in 2019 for osteomyelitis of right 2nd and 4th toes with MRSA negative and blood cx NGTD.     Other antibiotics: Unasyn 3 g iv q6h     Allergies: Patient has no known allergies.     List concerns for renal function: obesity (BMI 37), age    Pertinent cultures to date:   12/15 - blood cx pending     [facility] nares swab if pneumonia is a concern (ordered/positive/negative/n-a): ordered     Recent Labs     12/15/20  1220 20  0245   WBC 12.9* 11.5*   NEUTSPOLYS 78.10*  --      Recent Labs     12/15/20  1220   BUN 21   CREATININE 1.32   ALBUMIN 4.0     No results for input(s): VANCOTROUGH, VANCOPEAK, VANCORANDOM in the last 72 hours.    Intake/Output Summary (Last 24 hours) at 2020 0405  Last data filed at 2020 0138  Gross per 24 hour   Intake 1350 ml   Output no documentation   Net 1350 ml      Blood Pressure 140/83   Pulse 93   Temperature 37.4 °C (99.3 °F) (Oral)   Respiration 16   Height 1.778 m (5' 10\")   Weight 118.4 kg (261 lb)   Oxygen Saturation 97%  Temp (24hrs), Av.4 °C (99.3 °F), Min:37.4 °C (99.3 °F), Max:37.4 °C (99.3 °F)      A/P   1. Vancomycin dose change: new start vanco  2. Next vancomycin level:  ~1700 (not ordered yet)   3. Goal trough: 10-15 mcg/mL (cellulitis); if suspect/confirm S. auerus bacteriemia goal trough: 15-20 mcg/mL   4. Comments: Renal function appears relatively stable, but d/t his BMI >30 prefer a random level drawn approx 12 hours post-loading dose.  Pharmacy will continue to follow and recommend de-escalation of antibiotics as appropriate.      Rosalba Sauceda, PharmD      "

## 2020-12-16 NOTE — PROGRESS NOTES
Pt in Afib with RVR, 137/91- 159 pulse.  Pt on Cardiac Monitoring in place. Pt asymptomatic.   PRN Metoprolol Trartrate 2.5 given at 1324.

## 2020-12-16 NOTE — PROGRESS NOTES
[facility] Medicine Daily Progress Note    Date of Service  12/16/2020    Chief Complaint  65 y.o. male admitted 12/15/2020 with left leg swelling    Hospital Course  65 y.o. male who presented 12/15/2020 with complaint of left leg swelling x1 week.    Interval Problem Update  12/16: Atarax PRN ordered for c/o generalized itching. LLE erythematous, tender and edematous. He has multiple healing lesions superior to L and R patella. No drainage. Afebrile.     Consultants/Specialty  N/A     Code Status  Full Code    Disposition  Continue inpatient     Review of Systems  Review of Systems   Constitutional: Positive for malaise/fatigue. Negative for chills and fever.   Respiratory: Positive for wheezing. Negative for cough and shortness of breath.    Cardiovascular: Negative for chest pain and palpitations.   Gastrointestinal: Negative for abdominal pain, heartburn, nausea and vomiting.   Musculoskeletal: Positive for back pain, joint pain and myalgias.   Psychiatric/Behavioral: Negative for substance abuse.        Physical Exam  Pulse:  [] 159  Resp:  [10-43] 19  BP: ()/(56-96) 137/91  SpO2:  [86 %-98 %] 94 %    Physical Exam  Vitals signs and nursing note reviewed.   Constitutional:       Appearance: He is obese.   HENT:      Head: Normocephalic.      Mouth/Throat:      Mouth: Mucous membranes are moist.      Dentition: Abnormal dentition.      Pharynx: Oropharynx is clear.   Eyes:      General: No scleral icterus.  Cardiovascular:      Rate and Rhythm: Normal rate. Rhythm irregular.      Pulses: Normal pulses.   Pulmonary:      Effort: Pulmonary effort is normal. No respiratory distress.      Breath sounds: No stridor. Wheezing present. No rales.   Chest:      Chest wall: No tenderness.   Abdominal:      General: Bowel sounds are normal. There is distension.      Tenderness: There is no abdominal tenderness. There is no guarding.   Musculoskeletal:      Left lower leg: Edema present.   Skin:     General: Skin  is warm and dry.      Findings: Erythema (LLE) and lesion (LLE/ RLE) present.   Neurological:      Mental Status: He is alert and oriented to person, place, and time. Mental status is at baseline.         Fluids    Intake/Output Summary (Last 24 hours) at 12/16/2020 1337  Last data filed at 12/16/2020 0937  Gross per 24 hour   Intake 1450 ml   Output 400 ml   Net 1050 ml       Laboratory  Recent Labs     12/15/20  1220 12/16/20  0245   WBC 12.9* 11.5*   RBC 4.58* 3.94*   HEMOGLOBIN 15.0 12.6*   HEMATOCRIT 43.7 38.4*   MCV 95.4 97.5   MCH 32.8 32.0   MCHC 34.3 32.8*   RDW 51.0* 52.9*   PLATELETCT 127* 113*   MPV 11.0 11.5     Recent Labs     12/15/20  1220 12/16/20  0245   SODIUM 131* 131*   POTASSIUM 4.0 4.0   CHLORIDE 99 99   CO2 23 23   GLUCOSE 144* 165*   BUN 21 23*   CREATININE 1.32 1.34   CALCIUM 8.9 8.3*     Recent Labs     12/15/20  1220 12/16/20  0245   INR 1.97* 2.20*               Imaging  US-EXTREMITY VENOUS LOWER UNILAT LEFT   Final Result      DX-CHEST-PORTABLE (1 VIEW)   Final Result      1.  There is an enlarged cardiac silhouette with probable mild vascular congestion and right lower lobe atelectasis with a trace of right pleural fluid.           Assessment/Plan  * Cellulitis of left lower extremity- (present on admission)  Assessment & Plan  With lymphangitis.    LE doppler ultrasound negative for DVT and patient without SOB or chest pain.    Continue IV Unasyn and Vancomycin   Repeat MRSA by PCR pending.  Will d/c vanc if negative  Monitor vitals and CBC.      Severe sepsis (HCC)  Assessment & Plan  This is Severe Sepsis Present on admission  SIRS criteria identified on my evaluation include: Tachycardia, with heart rate greater than 90 BPM, Tachypnea, with respirations greater than 20 per minute and Leukocytosis, with WBC greater than 12,000  Source of infection is LLE cellulitis   Clinical indicators of end organ dysfunction include Systolic blood pressure (SBP) <90 mmHg or mean arterial pressure  <65 mmHg and Lactate >2 mmol/L (18.0 mg/dL)  Sepsis protocol initiated  Fluid resuscitation ordered per protocol  IV antibiotics as appropriate for source of sepsis  Reassessment: I have reassessed the patient's hemodynamic status  End organ dysfunction include(s):  Acute kidney failure     - LA improved   - Given 1.5 IVFB today . Hypotension improved   - 1/2 Blood culture + staph , possible contaminant . Repeat Blood cx ordered       Atypical chest pain- (present on admission)  Assessment & Plan  None since presentation to ED.   On admission, troponin T 23, which has been around his baseline over the past year.   EKG showed atrial fibrillation with ventricular heart rate 110s.    MPI on 11/21/2020 showed mild global hypokinesis with no evidence of infarct or reversible ischemia.  Monitor on telemetry.        Paroxysmal atrial fibrillation (HCC)- (present on admission)  Assessment & Plan  Continue home metoprolol.  PRN Metoprolol for HR > 140  Continue home warfarin.   Goal INR 2.5-3.5 give valve replacements (see below). Dosing per Pharmacy.    Monitor on telemetry.     COPD (chronic obstructive pulmonary disease) (HCC)- (present on admission)  Assessment & Plan  Not in acute exacerbation.    Not on home oxygen.   RT protocol.  PRN albuterol add for wheezing    Type 2 diabetes mellitus with complication, without long-term current use of insulin ([facility])- (present on admission)  Assessment & Plan  Hemoglobin A1c in Nov. 2020 - 7.6.  Hold home Metformin.  Continue correctional insulin.  Hypoglycemia protocol with Accuchecks.   Diabetic diet.    Essential hypertension, benign- (present on admission)  Assessment & Plan  SBP    Hold home antihypertensives.    Restart as appropriate.    H/O mitral valve and aortic valve replacement- (present on admission)  Assessment & Plan  Along with PPM.   With h/o DVT.    TTE on 11/20/20 without valvular issues.    Continue home warfarin.  Goal INR 2.5-3.5.  Currently  subtherapeutic at 1.97.  Dosing per Pharmacy.         VTE prophylaxis: Warfarin

## 2020-12-16 NOTE — ED NOTES
Pt is asking for his night time home medications. medications are not ordered at this time. Attempting to contact covering hospitalist for orders.

## 2020-12-16 NOTE — ASSESSMENT & PLAN NOTE
On admission, SBP    During antihypertensive medications in the setting of sepsis.  Will restart when clinically indicated.

## 2020-12-16 NOTE — ASSESSMENT & PLAN NOTE
Hemoglobin A1c in Nov. 2020 - 7.6.  Hold home Metformin.  Continue correctional insulin.  Hypoglycemia protocol with Accuchecks.   Diabetic diet.

## 2020-12-16 NOTE — PROGRESS NOTES
Pt AOx4 able to make need known, compliant with care. Denies any pain or discomfort. Pt on RA stating at 94% liters via NC. LS Wheezing through out. LBM 12/15/2020, + flatus. Skin LLE pittting edema and red, encouraged rest and elevation to BLE.  Ambulate with one assist, using urinal and bedside commode  PRN pain medication for 9/10 pain to LLE and Aterax for itching. Hourly rounding in place.

## 2020-12-16 NOTE — DISCHARGE PLANNING
"Pleasant, talkative patient. His 2 sisters live in [place], [place]. Does not currently have an Advance Directive - packet provided.    Is satisfied with current support system. Receives housekeeping services twice a week. Has been followed by [facility] - \"My nurse recommended I come to the ER today. I'm next due for a nurse visit on Friday (12/18/20).\" Lives alone in an apartment on the ground level. Has friends who can assist him. Independent with ADLs. Uses \"taxi bucks\", bus, \"and a system to get me to appointments.\"    Pending issues: Patient states he's had a hard time getting medications for his gout. Also states he's already had a sleep study done \"but I haven't been able to get a CPAP machine estephania I can't get there.\"      Care Transition Team Assessment    Information Source  Orientation : Oriented x 4  Information Given By: Patient  Informant's Name: Morales Avila Charline  Who is responsible for making decisions for patient? : Patient    Readmission Evaluation  Is this a readmission?: Yes - unplanned readmission  Why do you think you were readmitted?: Developed left leg redness.  Was an appointment arranged for you prior to discharge?: Yes, attended appointment  Were there new prescriptions you were supposed to fill after you were discharged?: Yes, prescriptions filled  Did you understand your discharge instructions?: Yes  Did you have enough support after your last discharge?: Yes    Elopement Risk  Legal Hold: No  Ambulatory or Self Mobile in Wheelchair: No-Not an Elopement Risk  Elopement Risk: Not at Risk for Elopement    Interdisciplinary Discharge Planning  Does Admitting Nurse Feel This Could be a Complex Discharge?: (Possibly.)  Primary Care Physician: Yelena Haney P.A.-C.(At [facility]  Lives with - Patient's Self Care Capacity: Alone and Able to Care For Self  Support Systems: Friends / Neighbors([facility]. Housekeeping svcs twice a wk.)  Housing / Facility: 1 Story " "Apartment / Condo(On ground floor.)  Do You Take your Prescribed Medications Regularly: Yes(Uses pharmacy at [facility].)  Able to Return to Previous ADL's: Yes  Mobility Issues: Yes  Prior Services: Skilled Home Health Services, Housekeeping / Homemaker Services  Patient Prefers to be Discharged to:: Home.  Assistance Needed: Unknown at this Time  Durable Medical Equipment: Other - Specify(Cane. Does not use his walker.)    Discharge Preparedness  What is your plan after discharge?: Home health care, Home with help  What are your discharge supports?: Other (comment)(Friends/neighbors. Homecare. Housekeeping.)  Prior Functional Level: Independent with Activities of Daily Living, Ambulatory, Independent with Medication Management, Uses Cane  Difficulity with ADLs: None  Difficulity with IADLs: Driving(Uses \"taxi bucks\", bus.)    Functional Assesment  Prior Functional Level: Independent with Activities of Daily Living, Ambulatory, Independent with Medication Management, Uses Cane    Finances  Financial Barriers to Discharge: No  Prescription Coverage: Yes    Vision / Hearing Impairment  Vision Impairment : No  Hearing Impairment : No    Values / Beliefs / Concerns  Values / Beliefs Concerns : No    Advance Directive  Advance Directive?: None  Advance Directive offered?: AD Booklet given    Domestic Abuse  Have you ever been the victim of abuse or violence?: No  Physical Abuse or Sexual Abuse: No  Verbal Abuse or Emotional Abuse: No  Possible Abuse/Neglect Reported to:: Not Applicable    Psychological Assessment  History of Substance Abuse: Alcohol, Marijuana  Date Last Used - Alcohol: 7 years ago.  Date Last Used - Marijuana: 3 months ago.  Substance Abuse Comments: Marijuana used for sleep.  History of Psychiatric Problems: No  Non-compliant with Treatment: No  Newly Diagnosed Illness: Yes(Cellulitis.)    Discharge Risks or Barriers  Discharge risks or barriers?: No    Anticipated Discharge " Information  Discharge Disposition: Still a Patient (30)  Discharge Address: [facility] [address], [place] [address]  Discharge Contact Phone Number: [phone]

## 2020-12-16 NOTE — ASSESSMENT & PLAN NOTE
Resolved  This is Severe Sepsis Present on admission  SIRS criteria identified on my evaluation include: Tachycardia, with heart rate greater than 90 BPM, Tachypnea, with respirations greater than 20 per minute and Leukocytosis, with WBC greater than 12,000  Source of infection is LLE cellulitis   Clinical indicators of end organ dysfunction include Systolic blood pressure (SBP) <90 mmHg or mean arterial pressure <65 mmHg and Lactate >2 mmol/L (18.0 mg/dL)  Sepsis protocol initiated  Fluid resuscitation ordered per protocol  IV antibiotics as appropriate for source of sepsis  Reassessment: I have reassessed the patient's hemodynamic status  End organ dysfunction include(s):  Acute kidney failure

## 2020-12-16 NOTE — DISCHARGE PLANNING
Received call from Cas[facility] at [facility]  stating that patient is current on service with [facility].

## 2020-12-16 NOTE — ED NOTES
Break RN: Pt medicated w/ IV metoprolol and NS bolus. Awaiting pharmacy to approve PO metoprolol. No other needs at this time.

## 2020-12-16 NOTE — ASSESSMENT & PLAN NOTE
Continue home metoprolol.  PRN Metoprolol for HR > 140  Continue home warfarin.   Goal INR 2.5-3.5 give valve replacements (see below). Dosing per Pharmacy.

## 2020-12-16 NOTE — ED NOTES
Pt helped to edge of bed to attempt to void. Pt updated on care and has no unmet needs. Pt is requesting a shaver when he gets upstairs. VSS. Call light within reach.

## 2020-12-16 NOTE — ASSESSMENT & PLAN NOTE
On presentation, reported intermittent sharp chest pain occurring at rest and on exertion associated with palpitations from past few years.  On admission EKG showed atrial fibrillation with ventricular heart rate  110.,  No signs of ischemia/infarct.  Interval troponin stable in 20s.  Last MPI in November 2020 showed mild global hypokinesia with no evidence of infarct or reversible ischemia.  Resolved

## 2020-12-16 NOTE — PROGRESS NOTES
Inpatient Anticoagulation Service Note    Date: 2020    Reason for Anticoagulation: Aortic Mechanical Valve Replacement  , Mechanical Mitral Valve Replacement, Deep Vein Thrombosis, Atrial Fibrillation   Target INR: 2.5 to 3.5  [id number] VASc Score: 3  HAS-BLED Score: 1   Hemoglobin Value: (!) 12.6  Hematocrit Value: (!) 38.4  Lab Platelet Value: (!) 113    INR from last 7 days     Date/Time INR Value    20 0245  (!) 2.2    12/15/20 1220  (!) 1.97        Dose from last 7 days     Date/Time Dose (mg)    20 1312  5    12/15/20 1842  7.5        Average Dose (mg): 32  Significant Interactions: Antibiotics  Bridge Therapy: No (If less than 5 days and overlap therapy discontinued -- document reason (i.e. Bleed Risk))    (If still on overlap therapy, if No -- document reason (i.e. Bleed Risk))    Reversal Agent Administered: Not Applicable  Comments: Patient's INR trended up since yesterday, expect to see the effects of the 7.5mg bolus tomorrow. Will resume home regimen and give 5mg today as opposed to bolusing as INR expected to continue trending up. Hgb/Hct stable. Reevaluate INR in the AM.    Plan:  Warfarin 5mg  Education Material Provided?: No(Chronic coumadin)  Pharmacist suggested discharge dosinmg qFri, 5mg AOD     Tennille Choudhury, PharmD

## 2020-12-16 NOTE — ED NOTES
Pt on call light every 5-10 minutes for the last 45 minutes asking for his metformin and lisinopril.

## 2020-12-16 NOTE — H&P
"Hospital Medicine History & Physical Note    Date of Service  12/15/2020    Primary Care Physician  Yelena Haney P.A.-C.    Consultants  None.    Code Status  Full Code    Chief Complaint  Chief Complaint   Patient presents with   • Leg Swelling     Left leg swelling X1 week, redness and old scabs   • Chest Pain     intermittent, non radiating       History of Presenting Illness  65 y.o. male who presented 12/15/2020 with complaint of left leg swelling x1 week.  Patient reports his \"leg problems started a week ago\" when he felt it was itchy and repeatedly scratched it until it became red and painful, and subsequently swollen.  He reported to the ED today because the swelling, redness, pain have not resolved.  He denies fever/chills, shortness of breath, palpitations, recent periods of immobility.    Patient also reports intermittent sharp chest pain, located on the left side of his sternum, lasting about 20 seconds.  It occurs randomly, both at rest or during exertion.  He has experienced this pain intermittently for the past few years.  He had a myocardial perfusion stress test on 11/21/2020 here at [facility], which showed mild global hypokinesis with no evidence of infarct or reversible ischemia.  He is currently without chest pain, including no pleuritic chest pain.    In the ED patient afebrile, initially tachycardic to 132.  However, on my exam his heart rate was in the 90s.  Patient does have a history of atrial fibrillation for which he is on warfarin and metoprolol.  WBC 12.9, 78% neutrophils.  Creatinine 1.32.  Lactate 2.9.  Troponin T 23, which has been around his baseline over the past year.  EKG showed atrial fibrillation with ventricular heart rate 110s.  The patient was given 1 dose of IV Unasyn for presumed left lower extremity cellulitis.    I am asked to admit the patient for further management.    Review of Systems  Review of Systems   Constitutional: Negative for chills, diaphoresis and fever. "   HENT: Negative for congestion, sinus pain and sore throat.    Eyes: Negative for discharge and redness.   Respiratory: Negative for cough, sputum production, shortness of breath and wheezing.    Cardiovascular: Positive for chest pain (intermittent; none since presenting to ED). Negative for palpitations.   Gastrointestinal: Negative for abdominal pain, nausea and vomiting.   Genitourinary: Negative for dysuria and urgency.   Musculoskeletal: Negative for myalgias and neck pain.        Positive for left leg pain.   Skin: Positive for itching (left leg).   Neurological: Negative for dizziness, weakness and headaches.   Endo/Heme/Allergies: Negative for environmental allergies.   Psychiatric/Behavioral: Negative for depression. The patient is not nervous/anxious.         Past Medical History   has a past medical history of Atrial fibrillation (HCC), Back pain, Bronchitis, CAD (coronary artery disease), COPD (chronic obstructive pulmonary disease) (HCC), Gout, Heart valve disease, Hypertension, Kidney stone, Obesity, Open wound (9/2/2009), Pacemaker, Personal history of venous thrombosis and embolism (2009), PVC (premature ventricular contraction) (4/13/2019), Renal disorder, and Type II or unspecified type diabetes mellitus without mention of complication, not stated as uncontrolled.    Surgical History   has a past surgical history that includes mitral valve replace (3/14/08); maze procedure (3/14/08); angiogram (7/3/2009); angiogram (7/4/2009); cath removal (7/4/2009); thrombectomy (7/4/2009); embolectomy (7/4/2009); irrigation & debridement general (7/20/2009); wide excision (7/20/2009); other cardiac surgery (2008); other abdominal surgery; mitral valve replace (3/8/2017); lopez (3/8/2017); and aortic valve replacement.     Family History  family history includes Diabetes in his mother; Lung Disease in his father.     Social History   reports that he quit smoking about 39 years ago. His smoking use included  cigarettes. He has a 22.50 pack-year smoking history. He has never used smokeless tobacco. He reports current drug use. Drug: Marijuana. He reports that he does not drink alcohol.    Allergies  No Known Allergies    Medications  Prior to Admission Medications   Prescriptions Last Dose Informant Patient Reported? Taking?   acetaminophen (TYLENOL) 500 MG Tab  Patient Yes No   Sig: Take 1,000 mg by mouth every 6 hours as needed.   albuterol 108 (90 Base) MCG/ACT Aero Soln inhalation aerosol  Patient Yes No   Sig: Inhale 2 Puffs by mouth every 6 hours as needed for Shortness of Breath.   allopurinol (ZYLOPRIM) 100 MG Tab  Patient Yes No   Sig: Take 100 mg by mouth every morning.   cetirizine (ZYRTEC) 10 MG Tab  Patient Yes No   Sig: Take 10 mg by mouth every day.   lisinopril (PRINIVIL) 2.5 MG Tab  Patient Yes No   Sig: Take 2.5 mg by mouth every day.   metFORMIN (GLUCOPHAGE) 500 MG Tab  Patient Yes No   Sig: Take 500 mg by mouth 2 times a day, with meals.   metoprolol SR (TOPROL XL) 50 MG TABLET SR 24 HR  Patient Yes No   Sig: Take 50 mg by mouth every morning.   tamsulosin (FLOMAX) 0.4 MG capsule  Patient No No   Sig: TAKE 1 CAPSULE BY MOUTH EVERY DAY   warfarin (COUMADIN) 2 MG Tab  Patient No No   Sig: Take 1 Tab by mouth every day.   Patient taking differently: Take 2 mg by mouth See Admin Instructions. Takes 2 mg every Friday   warfarin (COUMADIN) 5 MG Tab  Patient No No   Sig: Take 1 tab by mouth daily or as directed by anticoagulation clinic   Patient taking differently: Take 5 mg by mouth See Admin Instructions. Takes Monday, Tuesday, Wednesday, Thursday, Saturday, and Sunday      Facility-Administered Medications: None       Physical Exam  Temp:  [37.4 °C (99.3 °F)] 37.4 °C (99.3 °F)  Pulse:  [] 132  Resp:  [22-24] 22  BP: (106)/(60) 106/60  SpO2:  [92 %] 92 %    Physical Exam  Vitals signs and nursing note reviewed.   Constitutional:       General: He is not in acute distress.     Appearance: Normal  appearance. He is not toxic-appearing or diaphoretic.   HENT:      Head: Normocephalic and atraumatic.      Nose: No congestion.      Mouth/Throat:      Mouth: Mucous membranes are moist.      Pharynx: Oropharynx is clear.   Eyes:      General: No scleral icterus.     Conjunctiva/sclera: Conjunctivae normal.   Cardiovascular:      Rate and Rhythm: Normal rate. Rhythm irregular.      Heart sounds: Normal heart sounds. No murmur.   Pulmonary:      Effort: Pulmonary effort is normal. No respiratory distress.      Breath sounds: Normal breath sounds. No stridor. No wheezing.   Abdominal:      General: Bowel sounds are normal.      Palpations: Abdomen is soft.      Tenderness: There is no abdominal tenderness.   Musculoskeletal:         General: No swelling or tenderness.      Left lower leg: Edema (non-pitting edema, erythema, without purulent drainage) present.      Comments: Left leg with excoriations.   Likely lymphangitis present on inner aspect of left thigh.    Skin:     Capillary Refill: Capillary refill takes less than 2 seconds.      Coloration: Skin is not jaundiced.   Neurological:      Mental Status: He is alert and oriented to person, place, and time. Mental status is at baseline.   Psychiatric:         Mood and Affect: Mood normal.         Behavior: Behavior normal.         Laboratory:  Recent Labs     12/15/20  1220   WBC 12.9*   RBC 4.58*   HEMOGLOBIN 15.0   HEMATOCRIT 43.7   MCV 95.4   MCH 32.8   MCHC 34.3   RDW 51.0*   PLATELETCT 127*   MPV 11.0     Recent Labs     12/15/20  1220   SODIUM 131*   POTASSIUM 4.0   CHLORIDE 99   CO2 23   GLUCOSE 144*   BUN 21   CREATININE 1.32   CALCIUM 8.9     Recent Labs     12/15/20  1220   ALTSGPT 17   ASTSGOT 20   ALKPHOSPHAT 65   TBILIRUBIN 1.0   GLUCOSE 144*     Recent Labs     12/15/20  1220   INR 1.97*     Recent Labs     12/15/20  1220   NTPROBNP 548*         Recent Labs     12/15/20  1220   TROPONINT 23*       Imaging:  US-EXTREMITY VENOUS LOWER UNILAT LEFT    Final Result      DX-CHEST-PORTABLE (1 VIEW)   Final Result      1.  There is an enlarged cardiac silhouette with probable mild vascular congestion and right lower lobe atelectasis with a trace of right pleural fluid.          Assessment/Plan:  I anticipate this patient is appropriate for observation status at this time.    * Cellulitis of left lower extremity- (present on admission)  Assessment & Plan  With lymphangitis.    LE doppler ultrasound negative for DVT and patient without SOB or chest pain.    Continue IV Unasyn for now.  Can likely deescalate tomorrow.  Did not start coverage for MRSA given relatively benign clinical picture, no purulence/fluctuance on exam, and negative MRSA by PCR in 2019.    Repeat MRSA by PCR pending.   Monitor vitals and CBC.      Atypical chest pain- (present on admission)  Assessment & Plan  None since presentation to ED.   On admission, troponin T 23, which has been around his baseline over the past year.  EKG showed atrial fibrillation with ventricular heart rate 110s.    MPI on 11/21/2020 showed mild global hypokinesis with no evidence of infarct or reversible ischemia.  Monitor on telemetry.    Repeat troponin x1.      Paroxysmal atrial fibrillation (HCC)- (present on admission)  Assessment & Plan  Continue home metoprolol.  Continue home warfarin.  Goal INR 2.5-3.5 give valve replacements (see below).  Currently subtherapeutic at 1.97.  Dosing per Pharmacy.    Monitor on telemetry.     COPD (chronic obstructive pulmonary disease) (HCC)- (present on admission)  Assessment & Plan  Not in acute exacerbation.  Not on home oxygen.   RT protocol.    Type 2 diabetes mellitus with complication, without long-term current use of insulin (HCC)- (present on admission)  Assessment & Plan  Hemoglobin A1c in Nov. 2020 - 7.6.  Hold home Metformin.  Start correctional insulin.  Hypoglycemia protocol with Accuchecks.   Diabetic diet.    Essential hypertension, benign- (present on  admission)  Assessment & Plan  SBP 100s in the ED.  Hold home antihypertensives.  Restart as appropriate.    H/O mitral valve and aortic valve replacement- (present on admission)  Assessment & Plan  Along with PPM.   With h/o DVT.    TTE on 11/20/20 without valvular issues.    Continue home warfarin.  Goal INR 2.5-3.5.  Currently subtherapeutic at 1.97.  Dosing per Pharmacy.

## 2020-12-16 NOTE — ED NOTES
Pt sitting on edge of bed attempting to void via urinal. Pt given water. Pt has no unmet needs at this time. VSS. Hospitalist notified of improvement in HR.

## 2020-12-16 NOTE — ED NOTES
Pt placed on O2 @ 2L. Pt states he has sleep apnea and a CPAP. SPO2 was 84-89% while pt attempting to sleep.

## 2020-12-16 NOTE — PROGRESS NOTES
"Pharmacy Kinetics 65 y.o. male on vancomycin day # 1 12/16/2020    Vancomycin 3000 mg iv x1 12/16 @ 0511    Indication for Treatment: SSTI, S. aureus bacteremia     Proposed duration of treatment: TBD     Pertinent history per medical record: Admitted on 12/15/2020 for cellulitis of lower leg and chest pain. No purulence/fluctulance was noted on examination per MD note. Of note, patient was admitted in 2019 for osteomyelitis of right 2nd and 4th toes with MRSA negative and blood cx NGTD. Per hospitalist, plan to d/c vancomycin if MRSA PCR is negative (currently in process).    Other antibiotics: Unasyn 3g Q6H    Allergies: Patient has no known allergies.     List concerns for renal function: age >65, obesity    Pertinent cultures to date:   12/15 Bcx x2: NGTD    Recent Labs     12/15/20  1220 12/16/20  0245   WBC 12.9* 11.5*   NEUTSPOLYS 78.10*  --      Recent Labs     12/15/20  1220 12/16/20  0245   BUN 21 23*   CREATININE 1.32 1.34   ALBUMIN 4.0  --      No results for input(s): VANCOTROUGH, VANCOPEAK, VANCORANDOM in the last 72 hours.    Intake/Output Summary (Last 24 hours) at 12/16/2020 1323  Last data filed at 12/16/2020 0937  Gross per 24 hour   Intake 1450 ml   Output 400 ml   Net 1050 ml      /74   Pulse (!) 142   Temp 37.4 °C (99.3 °F) (Oral)   Resp 19   Ht 1.778 m (5' 10\")   Wt 118.4 kg (261 lb)   SpO2 94%  No data recorded.      A/P   1. Vancomycin dose change: Awaiting trough level  2. Next vancomycin level: 12/16 @ 1700 (order placed)  3. Goal trough: 10-15 mcg/ml for SSTI  4. Comments: Renal function stable?, no historical dosing was noted. Dosing done per troughs. Pharmacy will follow renal function, cultures, troughs, and patient's clinical status to guide subsequent recommendations.      Tennille Choudhury, PharmD      "

## 2020-12-16 NOTE — PROGRESS NOTES
Spiritual Care Note    Patient Information     Patient's Name: Morales Olivier   MRN: [id number]    YOB: 1955   Age and Gender: 65 y.o. male   Service Area: ED [facility]   Room (and Bed):  59/59 YEL   Ethnicity or Nationality:     Primary Language: English   Congregation/Spiritual preference: Religious   Place of Residence: [place]   Family/Friends/Others Present: No   Clinical Team Present: No   Medical Diagnosis(-es)/Procedure(s): Chest pain   Code Status: Full Code    Date of Admission: 12/15/2020   Length of Stay: 0 days        Spiritual Care Provider Information:  Name of Spiritual Care Provider: Yelena Crane  Title of Spiritual Care Provider: Associate   Phone Number: [phone]  E-mail: [email or web address]  Total time : 10 minutes    Spiritual Screen Results:    Gen Nursing        Palliative Care         Encounter/Request Information  Encounter/Request Type   Visited With: Patient  Nature of the Visit: Initial, On shift  General Visit: Yes  Referral From/ Origin of Request: [place] rounds, Verbal patient    Religous Needs/Values  Congregation Needs Visit  Congregation Needs: Prayer    Spiritual Assessment     Spiritual Care Encounters    Observations/Symptoms: Accepting, Thankfulness    Interaction/Conversation: Pt requested prayer and thanked the .    Assessment: Need    Need: Seeking Spiritual Assistance and Support    Interventions: Compassionate presence, prayer.    Outcomes: Spiritual Comfort    Plan: Visit Upon Request    Notes:

## 2020-12-16 NOTE — ED NOTES
Break RN:   NS bolus initiated per MAR.    Notified hospitalist regarding expiratory wheezes noted. Patient takes albulterol at home. Orders received for albuterol.

## 2020-12-16 NOTE — ASSESSMENT & PLAN NOTE
Along with PPM.   With h/o right lower extremity DVT in 2019.  TTE on 11/20/20 without valvular issues.    On admission subtherapeutic at 1.97  Continue home warfarin.  Dosing per Pharmacy.    Echo- preserved EF, but severe left atrial dilation:  Ordered lasix 20mg daily with Kdur.

## 2020-12-16 NOTE — ED NOTES
Pt readjusted in bed for meal and for comfort. Pt given meal tray. Pt requesting the rest of his night time medicaitons w

## 2020-12-16 NOTE — ED NOTES
Med rec completed per pt at bedside  Allergies reviewed  No ABX in last 14 days     Pt has both 2 mg and 5 mg tablets of warfarin  He takes the 2 mg tablets (x1) on Fridays and takes 5 mg tablets (x1) on all other days

## 2020-12-16 NOTE — ED NOTES
Pt is resting comfortably and has no unmet needs. VSS. Call light within reach. hospitalist updated on pt improvement in vitals.

## 2020-12-16 NOTE — ASSESSMENT & PLAN NOTE
Presented with left lower extremity swelling, erythema, tenderness.  No purulent discharge noted.   On admission SIRS 3/4 with leukocytosis, tachycardia and tachypnea.  Initially started broad-spectrum antibiotics with Unasyn and vancomycin.  Patient reports scratching his legs repeatedly since past few days, denies previous history of infection, IV drug use, cat scratch.  LE doppler ultrasound negative for DVT.   1 out of 2 blood culture coag negative staph =contamination.  Repeat BCs 12/17 and 12/18 negative x 2.    12/20: Increased erythema of the left lower leg from tibia marked margin to above the knee extending into the thigh.  Increased pain swelling noted on exam.  Increased with redness.  Attempt to culture scab left tibia change doxycycline and Keflex to Unasyn IV and vancomycin IV CT left lower extremity no gas formation or abscess seen.  Monitor CBC BMP daily.  Given recent history of acute kidney injury on admission feel it is prudent to change patient's vancomycin to Zyvox prior to worsening renal function.  I have also started patient on Lasix for his bilateral leg edema likely this is how patient developed his secondary bacterial cellulitis.  12/21:  significant improvement on zyvox and IV unasyn.  Decreased erythema and edema noted.    Continue abx

## 2020-12-17 PROBLEM — Z95.0 PACEMAKER: Status: ACTIVE | Noted: 2020-01-01

## 2020-12-17 NOTE — DIETARY
NUTRITION SERVICES: BMI - Pt with BMI >40 (=Body mass index is 40.08 kg/m².), morbid obesity. Weight taken via bed scale and pt is +1L fluids per I/O. Weight loss counseling not appropriate in acute care setting. RECOMMEND - Referral to outpatient nutrition services for weight management after D/C.

## 2020-12-17 NOTE — PROGRESS NOTES
Daily Progress Note:     Date of Service: 12/17/2020  Primary Team: [facility] White Team   Attending: No att. providers found   Senior Resident: Dr. Alcantar  Intern: Dr. Martinez   Contact:  [phone]    Chief Complaint:   Left leg swelling, erythema, tenderness.  Intermittent chest pain.    ID:  This is a 65-year-old male with past medical history significant for paroxysmal atrial fibrillation, history of mitral valve replacement on warfarin therapy, history of bradycardia status post pacemaker, lower extremity DVT in 2019, hypertension, type 2 diabetes mellitus, gout and COPD presenting on 12/15/2020 with left lower extremity pain found to have sepsis secondary to cellulitis with 1 out of 2 blood culture positive for gram-positive cocci.  Additionally,  patient had A. fib with RVR and mild EBEN.      Subjective:   Overnight patient had episode of A. fib with RVR heart rate in the 180s, home dose metoprolol was increased.  Antley heart rate controlled with medication.  Patient still reports lower extremity pain but states that it has improved since yesterday.  Denies any chest pain, lightheadedness, palpitations, syncope.  Denies any difficulty urinating or dysuria.      Consultants/Specialty:  None  Review of Systems:  Review of Systems   Constitutional: Negative for chills and fever.   HENT: Negative for ear discharge and ear pain.    Cardiovascular: Positive for leg swelling. Negative for chest pain and palpitations.        Left lower extremity pain.   Gastrointestinal: Negative for abdominal pain, nausea and vomiting.   Genitourinary: Negative for dysuria, frequency and urgency.   Neurological: Negative for tremors and sensory change.   Psychiatric/Behavioral: Negative for depression.       Objective Data:   Physical Exam:   Vitals:   Temp:  [35.8 °C (96.5 °F)-37 °C (98.6 °F)] 37 °C (98.6 °F)  Pulse:  [] 65  Resp:  [17-41] 17  BP: (109-135)/(67-95) 125/75  SpO2:  [91 %-99 %] 92 %       Physical  Exam  Constitutional:       General: He is not in acute distress.     Appearance: He is obese.   HENT:      Head: Normocephalic and atraumatic.      Nose: Nose normal.      Mouth/Throat:      Mouth: Mucous membranes are moist.   Eyes:      Extraocular Movements: Extraocular movements intact.      Pupils: Pupils are equal, round, and reactive to light.   Cardiovascular:      Rate and Rhythm: Rhythm irregular.      Pulses: Normal pulses.      Heart sounds: Murmur present.   Pulmonary:      Effort: Pulmonary effort is normal.      Breath sounds: Wheezing present.   Abdominal:      General: Bowel sounds are normal. There is no distension.      Palpations: Abdomen is soft.      Tenderness: There is no abdominal tenderness.   Musculoskeletal:      Left lower leg: Edema present.   Skin:     Comments: Erythema, tenderness, swelling in left lower extremity with excoriation, no purulent discharge noted, distal pulses +2.     Neurological:      Mental Status: He is alert.           Labs:   Lab Results   Component Value Date/Time    WBC 10.9 (H) 12/17/2020 12:56 AM    RBC 4.21 (L) 12/17/2020 12:56 AM    HEMOGLOBIN 13.5 (L) 12/17/2020 12:56 AM    HEMATOCRIT 40.9 (L) 12/17/2020 12:56 AM    MCV 97.1 12/17/2020 12:56 AM    MCH 32.1 12/17/2020 12:56 AM    MCHC 33.0 (L) 12/17/2020 12:56 AM    MPV 11.4 12/17/2020 12:56 AM    NEUTSPOLYS 80.60 (H) 12/17/2020 12:56 AM    LYMPHOCYTES 7.90 (L) 12/17/2020 12:56 AM    MONOCYTES 6.20 12/17/2020 12:56 AM    EOSINOPHILS 2.80 12/17/2020 12:56 AM    BASOPHILS 0.60 12/17/2020 12:56 AM    ANISOCYTOSIS 1+ 07/09/2009 02:35 AM      Lab Results   Component Value Date/Time    SODIUM 131 (L) 12/17/2020 12:56 AM    POTASSIUM 3.6 12/17/2020 12:56 AM    CHLORIDE 98 12/17/2020 12:56 AM    CO2 24 12/17/2020 12:56 AM    GLUCOSE 168 (H) 12/17/2020 12:56 AM    BUN 27 (H) 12/17/2020 12:56 AM    CREATININE 1.50 (H) 12/17/2020 12:56 AM    CREATININE 1.4 04/27/2009 10:08 AM      Imaging:   DX-CHEST-PORTABLE (1 VIEW)    Final Result         1.  Hazy right lung base opacity could represent subtle infiltrate versus atelectasis.   2.  Pulmonary vascular congestion and mild edema   3.  Cardiomegaly      US-EXTREMITY VENOUS LOWER UNILAT LEFT   Final Result      DX-CHEST-PORTABLE (1 VIEW)   Final Result      1.  There is an enlarged cardiac silhouette with probable mild vascular congestion and right lower lobe atelectasis with a trace of right pleural fluid.        Problem Representation: This is a 65-year-old male with past medical history significant for paroxysmal atrial fibrillation, history of mitral valve replacement on warfarin therapy, history of bradycardia status post pacemaker, lower extremity DVT in 2019, hypertension, type 2 diabetes mellitus, gout and COPD presenting on 12/15/2020 with left lower extremity pain found to have sepsis secondary to cellulitis with 1 out of 2 blood culture positive for gram-positive cocci.  Additionally,  patient had A. fib with RVR and mild EBEN.    .     * Cellulitis of left lower extremity- (present on admission)  Assessment & Plan  Presented with left lower extremity swelling, erythema, tenderness.  No purulent discharge noted.   On admission SIRS 3/4 with leukocytosis, tachycardia and tachypnea.  Initially started broad-spectrum antibiotics with Unasyn and vancomycin  Patient reports scratching his legs repeatedly since past few days, denies previous history of infection, IV drug use, cat scratch.  LE doppler ultrasound negative for DVT.   1 out of 2 blood culture positive for gram-positive cocci.  Concern for contamination.    Plan  Monitor vitals and CBC for resolution of leukocytosis.  Continue Unasyn, discontinue vancomycin as this is likely strep infection even no purulent discharge noted.  Last Hb 1 AC 7.6, continue insulin sliding scale with good glycemic control.  Repeat blood cultures pending.        Severe sepsis (HCC)  Assessment & Plan  This is Severe Sepsis Present on  admission  SIRS criteria identified on my evaluation include: Tachycardia, with heart rate greater than 90 BPM, Tachypnea, with respirations greater than 20 per minute and Leukocytosis, with WBC greater than 12,000  Source of infection is LLE cellulitis   Clinical indicators of end organ dysfunction include Systolic blood pressure (SBP) <90 mmHg or mean arterial pressure <65 mmHg and Lactate >2 mmol/L (18.0 mg/dL)  Sepsis protocol initiated  Fluid resuscitation ordered per protocol  IV antibiotics as appropriate for source of sepsis  Reassessment: I have reassessed the patient's hemodynamic status  End organ dysfunction include(s):  Acute kidney failure     Plan  Monitor vitals and CBC for resolution of leukocytosis.  Continue Unasyn, discontinue vancomycin as this is likely strep infection even no purulent discharge noted.  Last Hb 1 AC 7.6, continue insulin sliding scale with good glycemic control.  Repeat blood cultures pending.      Atypical chest pain- (present on admission)  Assessment & Plan  On presentation, reported intermittent sharp chest pain occurring at rest and on exertion associated with palpitations from past few years.  On admission EKG showed atrial fibrillation with ventricular heart rate  110.,  No signs of ischemia/infarct.  Interval troponin stable in 20s.  Last MPI in November 2020 showed mild global hypokinesia with no evidence of infarct or reversible ischemia.  Currently patient has no chest pain.    Plan  Continue to monitor on telemetry      Paroxysmal atrial fibrillation (HCC)- (present on admission)  Assessment & Plan  Continue home metoprolol.  PRN Metoprolol for HR > 140  Continue home warfarin.   Goal INR 2.5-3.5 give valve replacements (see below). Dosing per Pharmacy.    Monitor on telemetry.     Pacemaker  Assessment & Plan  History of pacemaker placement   Continue to monitor on telemetry    COPD (chronic obstructive pulmonary disease) (HCC)- (present on admission)  Assessment &  Plan  Not in acute exacerbation.    Not on home oxygen.   RT protocol.  PRN albuterol add for wheezing    Type 2 diabetes mellitus with complication, without long-term current use of insulin (HCC)- (present on admission)  Assessment & Plan  Hemoglobin A1c in Nov. 2020 - 7.6.  Hold home Metformin.  Continue correctional insulin.  Hypoglycemia protocol with Accuchecks.   Diabetic diet.    Essential hypertension, benign- (present on admission)  Assessment & Plan  On admission, SBP    During antihypertensive medications in the setting of sepsis.  Will restart when clinically indicated.      H/O mitral valve and aortic valve replacement- (present on admission)  Assessment & Plan  Along with PPM.   With h/o right lower extremity DVT in 2019.  TTE on 11/20/20 without valvular issues.    On admission subtherapeutic at 1.97  Continue home warfarin.  Dosing per Pharmacy.

## 2020-12-17 NOTE — PROGRESS NOTES
Pt arrived to unit via bed from yellow 59. Pt oriented to room, unit, and plan of care. Tele-monitor placed and monitor room notified. Monitor tech confirmed AFib. Patient currently A & O x 4; on room air; up x1 pivot; with complaints of acute pain. Medicated per MAR. All questions answered at this time. Call light within reach; fall precautions in place.

## 2020-12-17 NOTE — PROGRESS NOTES
Assumed care of patient at bedside report from NOC RN. Updated on POC. Patient currently A & O x 4; on room air while awake 2 L O2 nasal canula during sleep; up x1 pivot to commode; without complaints of acute pain. Patient reports still feeling itchy but has subsided somewhat. Call light within reach. Whiteboard updated. Fall precautions in place. Bed locked and in lowest position. All questions answered. No other needs indicated at this time.

## 2020-12-17 NOTE — PROGRESS NOTES
Pharmacy Kinetics Addendum:    65 y.o. male on vancomycin day # 1 12/16/2020    Received LD at 0500  Provider specified end date: TBD    Indication for Treatment: SSTI/staph bacteremia    Recent Labs     12/15/20  1220 12/16/20  0245   BUN 21 23*   CREATININE 1.32 1.34   ALBUMIN 4.0  --      Recent Labs     12/16/20  1653   VANCOTROUGH 14.4       A/P   1. Vancomycin dose change: vanco 1500 mg IV q12hr  2. Next vancomycin level: 2 days (not ordered)  3. Goal trough: 15-20 mcg/ml  4. Comments: Random 12 hour vanco level drawn appropriately. As expected, patient adequately clearing vanco. Plan to initiate q12hr vanco dosing plan for trough in 2 days once patient is closer to steady state. Consider sooner if clinically indicated.     Hugh Bullard, PharmD

## 2020-12-17 NOTE — PROGRESS NOTES
Inpatient Anticoagulation Service Note    Date: 12/17/2020    Reason for Anticoagulation: Aortic Mechanical Valve Replacement  , Mechanical Mitral Valve Replacement  , Deep Vein Thrombosis, Atrial Fibrillation   Target INR: 2.5 to 3.5  [id number] VASc Score: 3  HAS-BLED Score: 1   Hemoglobin Value: (!) 13.5  Hematocrit Value: (!) 40.9  Lab Platelet Value: (!) 131(Results confirmed by repeat testing.)    INR from last 7 days     Date/Time INR Value    12/17/20 0056  (!) 3.26    12/16/20 0245  (!) 2.2    12/15/20 1220  (!) 1.97        Dose from last 7 days     Date/Time Dose (mg)    12/17/20 1439  5    12/16/20 1312  5    12/16/20 1306  --    12/15/20 1842  7.5        Average Dose (mg): (Home dose: warfarin 2 mg Fridays and 5 mg all other days)  Significant Interactions: Antibiotics  Bridge Therapy: No     Comments: INR therapeutic at 3.2 for INR goal 2.5-3.5. Will continue pt on home warfarin dosing. DDI between Unaysn and warfarin noted. Will follow INR.     Education Material Provided?: No(Chronic coumadin)  Pharmacist suggested discharge dosing: warfarin 2 mg Fridays and 5 mg all other days     Wilfredo Hathaway, CbD

## 2020-12-18 NOTE — PROGRESS NOTES
Daily Progress Note:     Date of Service: 12/18/2020  Primary Team: [facility] White Team   Attending: Dr. Jak Nava  Senior Resident: Dr. Alcantar  Intern: Dr. Martinez   Contact:  [phone]    Chief Complaint:   Left leg swelling, erythema, tenderness.  Intermittent chest pain.    Subjective:   No acute problem overnight. Leg pain has slightly improved per patient.  No fever, chills.    Pulse 112 this am. O2 2 L.  Desat to 89 one episode overnight per nursing.  O2 ranging from 89-96.  Patient states that he has sleep apnea, but not using CPAP at home because he is in the process of getting it through his pulmonologist.    White count went down from 10.9 to 10.2  Hemoglobin went down from 13.5 to 12.3  band cells present 1.8      Consultants/Specialty:  None    Review of Systems:  Review of Systems   Constitutional: Negative for chills and fever.   HENT: Negative for ear discharge and ear pain.    Cardiovascular: Positive for leg swelling. Negative for chest pain and palpitations.        Left lower extremity pain.   Gastrointestinal: Negative for abdominal pain, nausea and vomiting.   Genitourinary: Negative for dysuria, frequency and urgency.   Neurological: Negative for tremors and sensory change.   Psychiatric/Behavioral: Negative for depression.       Objective Data:   Physical Exam:   Vitals:   Temp:  [36.3 °C (97.3 °F)-36.8 °C (98.2 °F)] 36.7 °C (98.1 °F)  Pulse:  [] 74  Resp:  [18-20] 20  BP: (101-130)/(63-92) 101/63  SpO2:  [89 %-100 %] 95 %       Physical Exam  Constitutional:       General: He is not in acute distress.     Appearance: He is obese.   HENT:      Head: Normocephalic and atraumatic.      Nose: Nose normal.      Mouth/Throat:      Mouth: Mucous membranes are moist.   Eyes:      Extraocular Movements: Extraocular movements intact.      Pupils: Pupils are equal, round, and reactive to light.   Cardiovascular:      Rate and Rhythm: Rhythm irregular.      Pulses: Normal pulses.      Heart  sounds: Murmur present.   Pulmonary:      Effort: Pulmonary effort is normal.      Breath sounds: Wheezing present.   Abdominal:      General: Bowel sounds are normal. There is no distension.      Palpations: Abdomen is soft.      Tenderness: There is no abdominal tenderness.   Musculoskeletal:      Left lower leg: Edema present.   Skin:     Comments: Erythema, tenderness, swelling in left lower extremity with excoriation, no purulent discharge noted, distal pulses +2.     Neurological:      Mental Status: He is alert.           Labs:   Lab Results   Component Value Date/Time    WBC 10.2 12/18/2020 03:56 AM    RBC 3.85 (L) 12/18/2020 03:56 AM    HEMOGLOBIN 12.3 (L) 12/18/2020 03:56 AM    HEMATOCRIT 36.8 (L) 12/18/2020 03:56 AM    MCV 95.6 12/18/2020 03:56 AM    MCH 31.9 12/18/2020 03:56 AM    MCHC 33.4 (L) 12/18/2020 03:56 AM    MPV 11.2 12/18/2020 03:56 AM    NEUTSPOLYS 74.60 (H) 12/18/2020 03:56 AM    LYMPHOCYTES 21.90 (L) 12/18/2020 03:56 AM    MONOCYTES 0.00 12/18/2020 03:56 AM    EOSINOPHILS 0.90 12/18/2020 03:56 AM    BASOPHILS 0.90 12/18/2020 03:56 AM    ANISOCYTOSIS 1+ 07/09/2009 02:35 AM      Lab Results   Component Value Date/Time    SODIUM 129 (L) 12/18/2020 03:56 AM    POTASSIUM 3.7 12/18/2020 03:56 AM    CHLORIDE 101 12/18/2020 03:56 AM    CO2 24 12/18/2020 03:56 AM    GLUCOSE 145 (H) 12/18/2020 03:56 AM    BUN 23 (H) 12/18/2020 03:56 AM    CREATININE 1.05 12/18/2020 03:56 AM    CREATININE 1.4 04/27/2009 10:08 AM      Imaging:   DX-CHEST-PORTABLE (1 VIEW)   Final Result         1.  Hazy right lung base opacity could represent subtle infiltrate versus atelectasis.   2.  Pulmonary vascular congestion and mild edema   3.  Cardiomegaly      US-EXTREMITY VENOUS LOWER UNILAT LEFT   Final Result      DX-CHEST-PORTABLE (1 VIEW)   Final Result      1.  There is an enlarged cardiac silhouette with probable mild vascular congestion and right lower lobe atelectasis with a trace of right pleural fluid.         Problem Representation: This is a 65-year-old male with past medical history significant for paroxysmal atrial fibrillation, history of mitral valve replacement on warfarin therapy, history of bradycardia status post pacemaker, lower extremity DVT in 2019, sleep apnea, hypertension, type 2 diabetes mellitus, gout and COPD presenting on 12/15/2020 with left lower extremity pain found to have sepsis secondary to cellulitis with 1 out of 2 blood culture positive for gram-positive cocci.  Additionally,  patient had A. fib with RVR and mild EBEN.  Currently on IV Unasyn switching to p.o. Keflex and doxycycline.  .     * Cellulitis of left lower extremity- (present on admission)  Assessment & Plan  #Severe sepsis    Presented with left lower extremity swelling, erythema, tenderness.  No purulent discharge noted.   On admission SIRS 3/4 with leukocytosis, tachycardia and tachypnea.  Initially started broad-spectrum antibiotics with Unasyn and vancomycin.  Patient reports scratching his legs repeatedly since past few days, denies previous history of infection, IV drug use, cat scratch.  LE doppler ultrasound negative for DVT.   1 out of 2 blood culture positive for gram-positive cocci.  Concern for contamination.    Plan  Complete 10 day course of antibiotics.  Vancomycin (12/15-12/16,total 2 days)  unasyn (12/16-12/18, total 3 days)  Doxycycline (12/18~12/24, total 7 days)  Keflex (12/19~12/25, total 7 days)  Last Hb 1 AC 7.6, continue insulin sliding scale with good glycemic control.  Repeat blood cultures pending.        Severe sepsis (HCC)  Assessment & Plan  Resolved  This is Severe Sepsis Present on admission  SIRS criteria identified on my evaluation include: Tachycardia, with heart rate greater than 90 BPM, Tachypnea, with respirations greater than 20 per minute and Leukocytosis, with WBC greater than 12,000  Source of infection is LLE cellulitis   Clinical indicators of end organ dysfunction include Systolic  blood pressure (SBP) <90 mmHg or mean arterial pressure <65 mmHg and Lactate >2 mmol/L (18.0 mg/dL)  Sepsis protocol initiated  Fluid resuscitation ordered per protocol  IV antibiotics as appropriate for source of sepsis  Reassessment: I have reassessed the patient's hemodynamic status  End organ dysfunction include(s):  Acute kidney failure       Atypical chest pain- (present on admission)  Assessment & Plan  On presentation, reported intermittent sharp chest pain occurring at rest and on exertion associated with palpitations from past few years.  On admission EKG showed atrial fibrillation with ventricular heart rate  110.,  No signs of ischemia/infarct.  Interval troponin stable in 20s.  Last MPI in November 2020 showed mild global hypokinesia with no evidence of infarct or reversible ischemia.  Currently patient has no chest pain.    Plan  Discontinued telemetry      Paroxysmal atrial fibrillation (HCC)- (present on admission)  Assessment & Plan  Continue home metoprolol.  PRN Metoprolol for HR > 140  Continue home warfarin.   Goal INR 2.5-3.5 give valve replacements (see below). Dosing per Pharmacy.      Pacemaker  Assessment & Plan  History of pacemaker placement   Continue to monitor on telemetry    COPD (chronic obstructive pulmonary disease) (HCC)- (present on admission)  Assessment & Plan  Not in acute exacerbation.    Not on home oxygen.   RT protocol.  PRN albuterol add for wheezing    Type 2 diabetes mellitus with complication, without long-term current use of insulin ([facility])- (present on admission)  Assessment & Plan  Hemoglobin A1c in Nov. 2020 - 7.6.  Hold home Metformin.  Continue correctional insulin.  Hypoglycemia protocol with Accuchecks.   Diabetic diet.    Essential hypertension, benign- (present on admission)  Assessment & Plan  On admission, SBP    During antihypertensive medications in the setting of sepsis.  Will restart when clinically indicated.      H/O mitral valve and aortic valve  replacement- (present on admission)  Assessment & Plan  Along with PPM.   With h/o right lower extremity DVT in 2019.  TTE on 11/20/20 without valvular issues.    On admission subtherapeutic at 1.97  Continue home warfarin.  Dosing per Pharmacy.      Diet: diabetic  Code: full  dvt prophylaxis:warfarin  Fluid:none   DC planning: home with home health  Lines/drain: peripheral iv

## 2020-12-18 NOTE — DISCHARGE PLANNING
Anticipated Discharge Disposition: Home with HH    Action: Patient was discussed in IDT rounds. Pt will likely dc today. LSW met with pt and he consented to have referral sent to [facility] for resumption of care. LSW will send referral once F2F and order are put in.    Barriers to Discharge: HH acceptance    Plan: care coordination to fax referral once order is received.

## 2020-12-18 NOTE — DISCHARGE PLANNING
Received Choice form at 0529  Agency/Facility Name: [facility]  Referral sent per Choice form @ [phone]

## 2020-12-18 NOTE — PROGRESS NOTES
Report received from T8 RN. Updated on POC.  Assumed care of patient upon arrival to unit. Patient currently A & O x 4; on room air; up x1 assist, steady on feet with steady gait, pain with ambulation; with complaints of 9/10 LLE pain. Patient oriented to unit and to call light system. Call light within reach. Pt educated to fall risk. Fall precautions in place. Pt provided with personal grooming items. Bed locked and in lowest position. All questions answered. No other needs indicated at this time.

## 2020-12-18 NOTE — PROGRESS NOTES
Monitor summary   -/.08/.34  Afib   HR up to 130 non sustained   Paced at 0259 non sustained   R PVC

## 2020-12-18 NOTE — DISCHARGE PLANNING
Anticipated Discharge Disposition: Home with Home health    Action: F2F and order received. Choice form faxed to [facility].     Barriers to Discharge: HH aceptance    Plan: Care coordination to f/u with [facility]

## 2020-12-18 NOTE — PROGRESS NOTES
I, Jak Nava D.O., have personally seen and examined the patient and discussed the management with the resident.   I performed a substantial portion of the EM visit face-to-face on the same date of service as the resident note.  I reviewed the resident's note and agree with the documented findings and plan of care, amended as necessary.      Additional attending comments:    12/18 - Overall improved from prior exam, but still swollen and markedly tender to LLE over region of cellulitis. I would like to see him get one more day of IV Abx therapy prior to DC.  OK to DC telemetry, no further episodes of RVR, if moved to [facility] bed, recommend q4hr vitals.  Anticipate DC in next 24hrs.     # Sepsis - likely 2/2 below  # Cellulitis - distal LLE  - cont empiric Abx therapy, narrowing per ID/pharm abx stewardship  - blood Cx x1 of 2 +taj, will redraw in AM on current therapy  - repeat Blood Cx pending     # HX Mitral & Ao Valve replacement  # Atrial Fibrillation, controlled now, had RVR on admission  # Chronic Anticoagulation - Warfarin, goal INR 2.5-3.5     # CP - likely related to RVR, as above - RESOLVED     # COPD - not in acute exacerbation     # DM2 -   # Hyperglycemia  - cont current mgmt     # Morbid Obesity - BMI: 42  - no indication or evidence to support calorie restriction in inpatient, rec outpt f/u with PCP to target nutritional goals.         ~ Jak Nava D.O.  Chief Resident  [facility] Internal Medicine.  [email or web address].

## 2020-12-18 NOTE — DOCUMENTATION QUERY
[facility]                                                                       Query Response Note      PATIENT:               AILYN MCINTOSH  ACCT #:                  [id number]  MRN:                     [id number]  :                      1955  ADMIT DATE:       12/15/2020 12:08 PM  DISCH DATE:          RESPONDING  PROVIDER #:        [id number]           QUERY TEXT:    BMI 40.08 and morbid obesity are documented in the Registered Dietician Tom.  Can you clarify if you agree with the assessment of morbid obesity?    NOTE:  If an appropriate response is not listed below, please respond with a new note.    The patient's Clinical Indicators include:   Dietary Note:  BMI > 40, 40.08, morbid obesity  Risk Factors: BMI 40.08, diabetes  Treatment: recommend referral to outpatient nutrition services for weight management after DC     Thank You,  Dennis Schmitt RN, BSN  Clinical    Connect via [employer]  Options provided:   -- Agree with assessment of morbid obesity   -- Disagree with assessment of morbid obesity   -- Unable to determine      Query created by: Dennis Schmitt on 2020 10:00 AM    RESPONSE TEXT:    Agree with assessment of morbid obesity          Electronically signed by:  JENNY CHANG MD 2020 4:47 PM

## 2020-12-18 NOTE — PROGRESS NOTES
Pt feels a little bit better and transport is here to take Pt to .  Resident on call plans to order PRN for nausea, but has not entered order yet.

## 2020-12-18 NOTE — FACE TO FACE
Face to Face Supporting Documentation - Home Health    The encounter with this patient was in whole or in part the primary reason for home health admission.    Date of encounter:   Patient:                    MRN:                       YOB: 2021  Morales Olivier  [id number]  1955     Home health to see patient for:  Skilled Nursing care for assessment, interventions & education and Physical Therapy evaluation and treatment    Skilled need for:  New Onset Medical Diagnosis cellulitis, sepsis    Skilled nursing interventions to include:  Venous access care and Line/Drain/Airway education and care    Homebound status evidenced by:  Need the aid of supportive devices such as crutches, canes, wheelchairs or walkers. Leaving home requires a considerable and taxing effort. There is a normal inability to leave the home.    Community Physician to provide follow up care: Yelena Haney P.A.-C.     Optional Interventions? No      I certify the face to face encounter for this home health care referral meets the CMS requirements and the encounter/clinical assessment with the patient was, in whole, or in part, for the medical condition(s) listed above, which is the primary reason for home health care. Based on my clinical findings: the service(s) are medically necessary, support the need for home health care, and the homebound criteria are met.  I certify that this patient has had a face to face encounter by myself.  George Martinez M.D. - NPI: [id number]

## 2020-12-18 NOTE — PROGRESS NOTES
Inpatient Anticoagulation Service Note    Date: 12/18/2020    Reason for Anticoagulation: Aortic Mechanical Valve Replacement  , Mechanical Mitral Valve Replacement  , Deep Vein Thrombosis, Atrial Fibrillation   Target INR: 2.5 to 3.5  [id number] VASc Score: 3  HAS-BLED Score: 1   Hemoglobin Value: (!) 12.3  Hematocrit Value: (!) 36.8  Lab Platelet Value: (!) 139    INR from last 7 days     Date/Time INR Value    12/18/20 0356  (!) 4.1    12/17/20 0056  (!) 3.26    12/16/20 0245  (!) 2.2    12/15/20 1220  (!) 1.97        Dose from last 7 days     Date/Time Dose (mg)    12/18/20 1423  2    12/17/20 1439  5    12/16/20 1312  5    12/16/20 1306  --    12/15/20 1842  7.5        Average Dose (mg): (Home dose: warfarin 2 mg Fridays and 5 mg all other days)  Significant Interactions: Antibiotics  Bridge Therapy: No     Comments: INR slightly supratherapeutic at 4.1. Per pt's home warfarin dosing, he will get the lower 2 mg dose today which should decrease the INR back into goal range (2.5-3.5). Will follow the INR.    Education Material Provided?: No(Chronic coumadin)  Pharmacist suggested discharge dosing: warfarin 2 mg Fridays and 5 mg all other days     Wilfredo Hathaway, PharmD

## 2020-12-18 NOTE — PROGRESS NOTES
Report called for Pt to transfer to PPU bed 104. Pt feeling a little nauseated.  Awaiting PRN order from MD then will arrange for transport.

## 2020-12-18 NOTE — PROGRESS NOTES
Assumed care of patient, bedside report received from OSWALD Mckeon. Updated on POC, call light within reach and fall precautions in place. Bed locked and in lowest position. Patient instructed to call for assistance before getting out of bed. All questions answered, no other needs at this time.

## 2020-12-19 PROBLEM — R60.9 EDEMA: Status: ACTIVE | Noted: 2020-01-01

## 2020-12-19 PROBLEM — I42.8 VALVULAR CARDIOMYOPATHY (HCC): Status: ACTIVE | Noted: 2020-01-01

## 2020-12-19 NOTE — RESPIRATORY CARE
Set patient up on CPAP of 5, he wore for 5 minutes and could not tolerate. Left CPAP at bedside to try again.

## 2020-12-19 NOTE — RESPIRATORY CARE
Patient states he has had a sleep study and was supposed to go  a CPAP, he has not received this unit. He is requesting to use a CPAP for his hospital stay. Settings unknown

## 2020-12-19 NOTE — PROGRESS NOTES
Pt have problem with coughing throughout the night and prevented pt from sleep. Tessalon was  Order last night but didn't no seem to help must. Pt request something strong.

## 2020-12-19 NOTE — PROGRESS NOTES
Inpatient Anticoagulation Service Note    Date: 12/19/2020    Reason for Anticoagulation: Aortic Mechanical Valve Replacement  , Mechanical Mitral Valve Replacement  , Deep Vein Thrombosis, Atrial Fibrillation   Target INR: 2.5 to 3.5  [id number] VASc Score: 3  HAS-BLED Score: 1   Hemoglobin Value: (!) 12.3  Hematocrit Value: (!) 36.8  Lab Platelet Value: (!) 139    INR from last 7 days     Date/Time INR Value    12/19/20 0035  (!) 3.92    12/18/20 0356  (!) 4.1    12/17/20 0056  (!) 3.26    12/16/20 0245  (!) 2.2    12/15/20 1220  (!) 1.97        Dose from last 7 days     Date/Time Dose (mg)    12/19/20 1200  3    12/18/20 1423  2    12/17/20 1439  5    12/16/20 1312  5    12/16/20 1306  --    12/15/20 1842  7.5          Average Dose (mg): (Home dose: warfarin 2 mg Fridays and 5 mg all other days)  Significant Interactions: Antibiotics  Bridge Therapy: No     Reversal Agent Administered: Not Applicable    Comments:   64 y/o M  presented 12/15/2020 with complaint of left leg swelling x1 week.  PMHx Afib on warfarin,  T2DM, HTN, and h/o mitral & aortic valve replacement. DDI noted . No documented overt signs of bleeding.  INR above goal , decreased over interval.     Plan:  Warfarin 3 mg po tonight , INR in AM.   Education Material Provided?: No(Chronic coumadin)  Pharmacist suggested discharge dosing: Warfarin 2 mg Fridays and 5 mg all other days. Follow up with anticoagulatin clinic after discharge.        Soham Rodriguez, PharmD, BCPS

## 2020-12-19 NOTE — PROGRESS NOTES
Pt lying in bed comfortably.A/O x 4, pain 7/10 in left leg. safety precautions in place. Call light and personal belongings within reach. Pt educated on POC and all questions answered at this time.  Educated patient on calling for assistance when needed. All pt needs are met at this time.  Will continue to monitor.

## 2020-12-19 NOTE — THERAPY
"Occupational Therapy   Initial Evaluation     Patient Name: Morales Olivier  Age:  65 y.o., Sex:  male  Medical Record #: [id number]  Today's Date: 12/19/2020     Precautions  Precautions: Fall Risk    Assessment  Patient is 65 y.o. male with a diagnosis of LLE swelling and chest pain.  Additional factors influencing patient status / progress: NA. Patient seen for OT eval this AM, receiving using BS. He demonstrated independence with brody care, transferring back to bed from BSC, and LB dressing. He reports having help with housekeeping. Patient appears to be close to baseline, does have limited mobility 2/2 LLE pain. Anticipate that he will continue to improve as the swelling goes down.       Plan    Recommend Occupational Therapy for Evaluation only    DC Equipment Recommendations: None  Discharge Recommendations: Anticipate that the patient will have no further occupational therapy needs after discharge from the hospital     Subjective    \"I think I can do that be myself.\"     Objective       12/19/20 0916   Prior Living Situation   Prior Services Skilled Home Health Services;Housekeeping / Homemaker Services  (home RN)   Housing / Facility 1 Story Apartment / Condo   Steps Into Home 0   Elevator Yes   Bathroom Set up Bathtub / Shower Combination;Grab Bars   Equipment Owned Front-Wheel Walker;Bed Side Commode   Lives with - Patient's Self Care Capacity Alone and Able to Care For Self   Comments Pt reports that he has a home RN 1-2x/wk as well as housekeeping services 1x/wk. He has to go upstairs to do the laundry, but is able to use the elevator.    Prior Level of ADL Function   Self Feeding Independent   Grooming / Hygiene Independent   Bathing Independent   Dressing Independent   Toileting Independent   Prior Level of IADL Function   Medication Management Independent   Laundry Requires Assist   Kitchen Mobility Independent   Finances Independent   Home Management Requires Assist   Shopping Independent   Prior " Level Of Mobility Independent With Device in Community;Independent With Device in Home   Driving / Transportation Utilizes [employer] for Transportation   Precautions   Precautions Fall Risk   Non Verbal Descriptors   Non Verbal Scale  Calm   Cognition    Cognition / Consciousness WDL   Level of Consciousness Alert   Comments Pleasant and cooperative   Balance Assessment   Sitting Balance (Static) Fair +   Sitting Balance (Dynamic) Fair   Standing Balance (Static) Fair   Standing Balance (Dynamic) Fair -   Weight Shift Sitting Fair   Weight Shift Standing Fair   Comments Pivot from BSC to bed   Bed Mobility    Sit to Supine Supervised   ADL Assessment   Grooming Seated;Supervision   Lower Body Dressing Supervision   Toileting Supervision   Comments Pt was able to clean up after a BM. His feet were wet and he solicited help drying them, but later demo'd ability to bring legs to him, was touching feet with ease   Functional Mobility   Sit to Stand Supervised   Bed, Chair, Wheelchair Transfer Supervised   Toilet Transfers Supervised   Transfer Method Stand Pivot   Mobility sit><stand, BSC xfer, sit>sup   Edema / Skin Assessment   Edema / Skin  X   Comments LLE cellulitis, red and swollen   Activity Tolerance   Sitting in Chair 10 mins   Sitting Edge of Bed 8 mins   Standing 1 min   Education Group   Education Provided Role of Occupational Therapist   Role of Occupational Therapist Patient Response Patient;Acceptance;Explanation;Verbal Demonstration   Problem List   Problem List None

## 2020-12-20 PROBLEM — K64.9 BLEEDING HEMORRHOID: Status: ACTIVE | Noted: 2020-01-01

## 2020-12-20 NOTE — PROGRESS NOTES
Inpatient Anticoagulation Service Note    Date: 12/20/2020    Reason for Anticoagulation: Aortic Mechanical Valve Replacement  , Mechanical Mitral Valve Replacement  , Deep Vein Thrombosis, Atrial Fibrillation   Target INR: 2.5 to 3.5  [id number] VASc Score: 3  HAS-BLED Score: 1   Hemoglobin Value: (!) 12.3  Hematocrit Value: (!) 36.8  Lab Platelet Value: (!) 139    INR from last 7 days     Date/Time INR Value    12/20/20 0057  (!) 3.75    12/19/20 0035  (!) 3.92    12/18/20 0356  (!) 4.1    12/17/20 0056  (!) 3.26    12/16/20 0245  (!) 2.2    12/15/20 1220  (!) 1.97        Dose from last 7 days     Date/Time Dose (mg)    12/20/20 0800  4    12/19/20 1200  3    12/18/20 1423  2    12/17/20 1439  5    12/16/20 1312  5    12/16/20 1306  --    12/15/20 1842  7.5          Average Dose (mg): (Home dose: warfarin 2 mg Fridays and 5 mg all other days)  Significant Interactions: Antibiotics  Bridge Therapy: No     Reversal Agent Administered: Not Applicable       Comments:   64 y/o M  presented 12/15/2020 with complaint of left leg swelling x1 week.  PMHx Afib on warfarin,  T2DM, HTN, and h/o mitral & aortic valve replacement. DDI noted . No documented overt signs of bleeding.  INR above goal , decreased over interval.      Plan:  Warfarin 4 mg po tonight , INR in AM.   Education Material Provided?: No(Chronic coumadin)  Pharmacist suggested discharge dosing: Resume home regimen:Warfarin 2 mg Fridays and 5 mg all other days. Follow up with anticoagulatin clinic after discharge.         Soham Rodriguez, PharmD, BCPS

## 2020-12-20 NOTE — PROGRESS NOTES
Hospitalist Dr. Garcia here. Orders received. Discussed pt's care with Hospitalist. Pt up to BSC for BM. Large amt bright red rectal bleeding noted when helping pt wipe after BM. Asked pt if has hemmoroids, pt answers affirmatively. Pt says he often has rectal bleeding. Reported all of this to Dr. Garcia who is at [facility] station at this time.

## 2020-12-20 NOTE — PROGRESS NOTES
Wound culture collected from wound to LLE. Scab easily removed from LLE and swab taken. Band-aid placed to area of scab removal.

## 2020-12-20 NOTE — PROGRESS NOTES
"Pharmacy Kinetics 65 y.o. male on vancomycin day # 1      2020    Currently on Vancomycin 3000 mg iv x1  Provider specified end date: TBD    Indication for Treatment: SSTI     Pertinent history per medical record: Admitted on 12/15/2020 for Chest Pain. 64 yo M w/ PMHx of Afib on warfarin,  T2DM, HTN, and h/o mitral & aortic valve replacement (on warfarin) who presented with severe sepsis due to LLE cellulitis.    Other antibiotics: Unasyn 3g iv q6hr     Allergies: Patient has no known allergies.     List concerns for renal function: obesity, elevated SCr     Pertinent cultures to date:   20:PBCx2:NGTD  20:PBCx2:NGTD    MRSA nares swab if pneumonia is a concern (ordered/positive/negative/n-a): NA     Recent Labs     20  0356 20  0057   WBC 10.2 9.8   NEUTSPOLYS 74.60* 66.40   BANDSSTABS 1.80 0.90     Recent Labs     20  0356 20  0057   BUN 23* 17   CREATININE 1.05 1.21     No results for input(s): VANCOTROUGH, VANCOPEAK, VANCORANDOM in the last 72 hours.    Intake/Output Summary (Last 24 hours) at 2020 1348  Last data filed at 2020 0828  Gross per 24 hour   Intake 240 ml   Output 1020 ml   Net -780 ml      /66   Pulse 99   Temp 36.6 °C (97.9 °F) (Temporal)   Resp 20   Ht 1.778 m (5' 10\")   Wt (!) 126.7 kg (279 lb 5.2 oz)   SpO2 90%  Temp (24hrs), Av.6 °C (97.8 °F), Min:36.3 °C (97.3 °F), Max:36.7 °C (98 °F)      A/P   1. Vancomycin dose change: Pulse dosing  2. Next vancomycin level: 20 hr random level: 20@1200  3. Goal trough: 10-15 mcg/mL   4. Comments: No leukocytosis, afebrile. On vanco/unasyn - then Doxy/Yvsyoc90/18-12/20 then Vanco/Unasyn reinitiated 2/2 wound appearing worse, wound cx ordered. Will pulse dose due to body habitius and baseline SCr.     Soham Rodriguez PharmD BCPS   "

## 2020-12-20 NOTE — ASSESSMENT & PLAN NOTE
Bilateral LE edema noted with secondary cellulitis from scratching left lower leg.  Severe left atrial enlargement on echo with known history of bioprosthetic MVR.  Start lasix 20mg po daily with Kdur.

## 2020-12-20 NOTE — PROGRESS NOTES
Hospital Medicine Daily Progress Note    Date of Service  12/19/2020    Chief Complaint  65 y.o. male admitted 12/15/2020 with right leg cellulitis.    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  He states he has been itching and scratching his lower legs.  He developed secondary cellulitis to his left lower leg.  Review of echocardiogram demonstrates dilated left atrium severe.  Functioning bioprosthetic mitral valve prosthesis EF 55%.    Interval Problem Update  12/19: Patient seen and examined.  Review of chest x-ray with pulmonary edema noted.  Bilateral lower extremity edema also noted.  Patient has multiple scratch marks to his left lower leg with subsequent cellulitis with erythema noted margins marked.  Of note ultrasound of lower extremity negative for DVT.  He had been treated with IV antibiotics and is currently on oral doxycycline and oral Keflex.  He is currently coughing and short of breath.  Patient gio in atrial fibrillation with INR 3.92 therapeutic.  I have ordered Lasix 20 mg p.o. daily with potassium 20 mill equivalents daily.  Continue with current antibiotics.  I have also reviewed blood cultures from admission 12/15/2020 coag negative staph appears to be contaminant.  Repeat blood cultures 12/17 and 12/18 -x2.  Consultants/Specialty  none    Code Status  Full Code    Disposition  Home once medically improved.    Review of Systems  Review of Systems   Constitutional: Positive for malaise/fatigue. Negative for chills, diaphoresis and fever.   HENT: Negative for congestion and sore throat.    Eyes: Negative for pain and discharge.   Respiratory: Positive for cough and shortness of breath. Negative for hemoptysis, sputum production and wheezing.    Cardiovascular: Positive for leg swelling. Negative for  chest pain, palpitations and claudication.   Gastrointestinal: Negative for abdominal pain, constipation, diarrhea, melena, nausea and vomiting.   Genitourinary: Negative for dysuria, frequency and urgency.   Musculoskeletal: Negative for back pain, joint pain, myalgias and neck pain.   Skin: Positive for itching and rash.   Neurological: Negative for dizziness, sensory change, speech change, focal weakness, loss of consciousness, weakness and headaches.   Endo/Heme/Allergies: Does not bruise/bleed easily.   Psychiatric/Behavioral: Negative for depression, substance abuse and suicidal ideas.        Physical Exam  Temp:  [36.3 °C (97.3 °F)-36.7 °C (98 °F)] 36.3 °C (97.3 °F)  Pulse:  [58-87] 70  Resp:  [16-20] 20  BP: ()/(57-78) 103/65  SpO2:  [93 %-98 %] 97 %    Physical Exam  Constitutional:       General: He is not in acute distress.     Appearance: He is not diaphoretic.   HENT:      Head: Normocephalic and atraumatic.      Mouth/Throat:      Pharynx: No oropharyngeal exudate.   Eyes:      General: No scleral icterus.        Right eye: No discharge.         Left eye: No discharge.      Conjunctiva/sclera: Conjunctivae normal.      Pupils: Pupils are equal, round, and reactive to light.   Neck:      Musculoskeletal: Normal range of motion and neck supple.      Thyroid: No thyromegaly.      Vascular: No JVD.      Trachea: No tracheal deviation.   Cardiovascular:      Rate and Rhythm: Normal rate and regular rhythm.      Heart sounds: No murmur. No friction rub. No gallop.       Comments: Bioprosthetic valve click heard.  Pulmonary:      Effort: Pulmonary effort is normal. No respiratory distress.      Breath sounds: Normal breath sounds. No wheezing or rales.   Chest:      Chest wall: No tenderness.   Abdominal:      General: Bowel sounds are normal. There is no distension.      Palpations: Abdomen is soft. There is no mass.      Tenderness: There is no abdominal tenderness. There is no guarding or rebound.    Musculoskeletal: Normal range of motion.         General: Swelling and tenderness present.      Right lower leg: Edema present.      Left lower leg: Edema present.   Lymphadenopathy:      Cervical: No cervical adenopathy.   Skin:     General: Skin is warm and dry.      Findings: Erythema (multiple scratch marks noted LLE with surrounding erythema/ cellulitis LLE.) present. No rash.   Neurological:      Mental Status: He is alert and oriented to person, place, and time.      Cranial Nerves: No cranial nerve deficit.      Motor: No abnormal muscle tone.   Psychiatric:         Behavior: Behavior normal.         Thought Content: Thought content normal.         Judgment: Judgment normal.         Fluids    Intake/Output Summary (Last 24 hours) at 12/19/2020 2012  Last data filed at 12/19/2020 1309  Gross per 24 hour   Intake 480 ml   Output 2190 ml   Net -1710 ml       Laboratory  Recent Labs     12/17/20  0056 12/18/20  0356   WBC 10.9* 10.2   RBC 4.21* 3.85*   HEMOGLOBIN 13.5* 12.3*   HEMATOCRIT 40.9* 36.8*   MCV 97.1 95.6   MCH 32.1 31.9   MCHC 33.0* 33.4*   RDW 52.5* 51.2*   PLATELETCT 131* 139*   MPV 11.4 11.2     Recent Labs     12/17/20  0056 12/18/20  0356   SODIUM 131* 129*   POTASSIUM 3.6 3.7   CHLORIDE 98 101   CO2 24 24   GLUCOSE 168* 145*   BUN 27* 23*   CREATININE 1.50* 1.05   CALCIUM 8.4* 8.5     Recent Labs     12/17/20  0056 12/18/20  0356 12/19/20  0035   INR 3.26* 4.10* 3.92*               Imaging  DX-CHEST-PORTABLE (1 VIEW)   Final Result         1.  Hazy right lung base opacity could represent subtle infiltrate versus atelectasis.   2.  Pulmonary vascular congestion and mild edema   3.  Cardiomegaly      US-EXTREMITY VENOUS LOWER UNILAT LEFT   Final Result      DX-CHEST-PORTABLE (1 VIEW)   Final Result      1.  There is an enlarged cardiac silhouette with probable mild vascular congestion and right lower lobe atelectasis with a trace of right pleural fluid.           Assessment/Plan  * Cellulitis of  left lower extremity- (present on admission)  Assessment & Plan      Presented with left lower extremity swelling, erythema, tenderness.  No purulent discharge noted.   On admission SIRS 3/4 with leukocytosis, tachycardia and tachypnea.  Initially started broad-spectrum antibiotics with Unasyn and vancomycin.  Patient reports scratching his legs repeatedly since past few days, denies previous history of infection, IV drug use, cat scratch.  LE doppler ultrasound negative for DVT.   1 out of 2 blood culture coag negative staph =contamination.  Repeat BCs 12/17 and 12/18 negative x 2.    Plan  Complete 10 day course of antibiotics.  Vancomycin (12/15-12/16,total 2 days)  unasyn (12/16-12/18, total 3 days)  Doxycycline (12/18~12/24, total 7 days)  Keflex (12/19~12/25, total 7 days)  Last Hb 1 AC 7.6, continue insulin sliding scale with good glycemic control.          Severe sepsis (HCC)  Assessment & Plan  Resolved  This is Severe Sepsis Present on admission  SIRS criteria identified on my evaluation include: Tachycardia, with heart rate greater than 90 BPM, Tachypnea, with respirations greater than 20 per minute and Leukocytosis, with WBC greater than 12,000  Source of infection is LLE cellulitis   Clinical indicators of end organ dysfunction include Systolic blood pressure (SBP) <90 mmHg or mean arterial pressure <65 mmHg and Lactate >2 mmol/L (18.0 mg/dL)  Sepsis protocol initiated  Fluid resuscitation ordered per protocol  IV antibiotics as appropriate for source of sepsis  Reassessment: I have reassessed the patient's hemodynamic status  End organ dysfunction include(s):  Acute kidney failure       Atypical chest pain- (present on admission)  Assessment & Plan  On presentation, reported intermittent sharp chest pain occurring at rest and on exertion associated with palpitations from past few years.  On admission EKG showed atrial fibrillation with ventricular heart rate  110.,  No signs of  ischemia/infarct.  Interval troponin stable in 20s.  Last MPI in November 2020 showed mild global hypokinesia with no evidence of infarct or reversible ischemia.  Currently patient has no chest pain.    Plan  Discontinued telemetry      Paroxysmal atrial fibrillation (HCC)- (present on admission)  Assessment & Plan  Continue home metoprolol.  PRN Metoprolol for HR > 140  Continue home warfarin.   Goal INR 2.5-3.5 give valve replacements (see below). Dosing per Pharmacy.      Valvular cardiomyopathy (HCC)  Assessment & Plan  Bilateral LE edema noted with secondary cellulitis from scratching left lower leg.  Severe left atrial enlargement on echo with known history of bioprosthetic MVR.  Start lasix 20mg po daily with Kdur.    Pacemaker  Assessment & Plan  History of pacemaker placement   Continue to monitor on telemetry    COPD (chronic obstructive pulmonary disease) ([facility])- (present on admission)  Assessment & Plan  Not in acute exacerbation.    Not on home oxygen.   RT protocol.  PRN albuterol add for wheezing    Type 2 diabetes mellitus with complication, without long-term current use of insulin ([facility])- (present on admission)  Assessment & Plan  Hemoglobin A1c in Nov. 2020 - 7.6.  Hold home Metformin.  Continue correctional insulin.  Hypoglycemia protocol with Accuchecks.   Diabetic diet.    Essential hypertension, benign- (present on admission)  Assessment & Plan  On admission, SBP    During antihypertensive medications in the setting of sepsis.  Will restart when clinically indicated.      H/O mitral valve and aortic valve replacement- (present on admission)  Assessment & Plan  Along with PPM.   With h/o right lower extremity DVT in 2019.  TTE on 11/20/20 without valvular issues.    On admission subtherapeutic at 1.97  Continue home warfarin.  Dosing per Pharmacy.    Reviewed echo:  preserved EF, but severe left atrial dilation:  Ordered lasix 20mg daily with Kdur.       VTE prophylaxis: Therapeutic  Coumadin

## 2020-12-21 NOTE — PROGRESS NOTES
Hospital Medicine Daily Progress Note    Date of Service  12/21/2020    Chief Complaint  65 y.o. male admitted 12/15/2020 with right leg cellulitis.    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  He states he has been itching and scratching his lower legs.  He developed secondary cellulitis to his left lower leg.  Review of echocardiogram demonstrates dilated left atrium severe.  Functioning bioprosthetic mitral valve prosthesis EF 55%.    Interval Problem Update  12/19: Patient seen and examined.  Review of chest x-ray with pulmonary edema noted.  Bilateral lower extremity edema also noted.  Patient has multiple scratch marks to his left lower leg with subsequent cellulitis with erythema noted margins marked.  Of note ultrasound of lower extremity negative for DVT.  He had been treated with IV antibiotics and is currently on oral doxycycline and oral Keflex.  He is currently coughing and short of breath.  Patient gio in atrial fibrillation with INR 3.92 therapeutic.  I have ordered Lasix 20 mg p.o. daily with potassium 20 mill equivalents daily.  Continue with current antibiotics.  I have also reviewed blood cultures from admission 12/15/2020 coag negative staph appears to be contaminant.  Repeat blood cultures 12/17 and 12/18 -x2.  12/20: Worsening cellulitis noted today with erythema from the upper tibia past the knee and progressing.  Warm to the touch and tender left knee.  Patient does have a history of gout however this appears to be cellulitis extension from prior cellulitis.  I have ordered a CT without contrast of the left lower extremity to rule out subcutaneous air.  I have also changed antibiotics from Keflex to doxycycline p.o. to vancomycin IV and Unasyn IV to cover for strep or staph.  Attempt culture left  shin scab.  I placed fluid restrictions on patient since he is coughing bilateral lower extremity edema from his severe left atrial dilation status post mitral valve replacement.  Patient also complaining of gout in his knee and ankle I have ordered colchicine 0.6 mg twice daily also of note patient had bright red blood per rectum from hemorrhoids after bowel movement this afternoon.  Hold Coumadin tonight.  Lasix 20 mg p.o. daily to continue at twice daily.  changed vancomycin to Zyvox for history of EBEN on admission and start of lasix.   12/21:  Significant improvement of left lower leg cellulitis and edema on zyvox and unasyn.  Gout improved on allopurinol and colchicine.  Edema decreased, cough decreased on lasix.  Renal function wnl.     Consultants/Specialty  none    Code Status  Full Code    Disposition  Home once medically improved.    Review of Systems  Review of Systems   Constitutional: Positive for malaise/fatigue. Negative for chills, diaphoresis and fever.   HENT: Negative for congestion and sore throat.    Eyes: Negative for pain and discharge.   Respiratory: Positive for cough and shortness of breath. Negative for hemoptysis, sputum production and wheezing.    Cardiovascular: Positive for leg swelling. Negative for chest pain, palpitations and claudication.   Gastrointestinal: Negative for abdominal pain, constipation, diarrhea, melena, nausea and vomiting.   Genitourinary: Negative for dysuria, frequency and urgency.   Musculoskeletal: Negative for back pain, joint pain, myalgias and neck pain.   Skin: Positive for itching and rash.   Neurological: Negative for dizziness, sensory change, speech change, focal weakness, loss of consciousness, weakness and headaches.   Endo/Heme/Allergies: Does not bruise/bleed easily.   Psychiatric/Behavioral: Negative for depression, substance abuse and suicidal ideas.        Physical Exam  Temp:  [36.1 °C (97 °F)-37.1 °C (98.8 °F)] 36.6 °C (97.8 °F)  Pulse:   [] 81  Resp:  [16-19] 18  BP: (110-115)/(75-85) 115/80  SpO2:  [91 %-94 %] 94 %    Physical Exam  Constitutional:       General: He is not in acute distress.     Appearance: He is not diaphoretic.   HENT:      Head: Normocephalic and atraumatic.      Mouth/Throat:      Pharynx: No oropharyngeal exudate.   Eyes:      General: No scleral icterus.        Right eye: No discharge.         Left eye: No discharge.      Conjunctiva/sclera: Conjunctivae normal.      Pupils: Pupils are equal, round, and reactive to light.   Neck:      Musculoskeletal: Normal range of motion and neck supple.      Thyroid: No thyromegaly.      Vascular: No JVD.      Trachea: No tracheal deviation.   Cardiovascular:      Rate and Rhythm: Normal rate and regular rhythm.      Heart sounds: No murmur. No friction rub. No gallop.       Comments: Bioprosthetic valve click heard.  Pulmonary:      Effort: Pulmonary effort is normal. No respiratory distress.      Breath sounds: Normal breath sounds. No wheezing or rales.   Chest:      Chest wall: No tenderness.   Abdominal:      General: Bowel sounds are normal. There is no distension.      Palpations: Abdomen is soft. There is no mass.      Tenderness: There is no abdominal tenderness. There is no guarding or rebound.   Musculoskeletal: Normal range of motion.         General: Swelling and tenderness present.      Right lower leg: Edema present.      Left lower leg: Edema present.   Lymphadenopathy:      Cervical: No cervical adenopathy.   Skin:     General: Skin is warm and dry.      Findings: Erythema (multiple scratch marks noted LLE with surrounding erythema/ cellulitis LLE.) present. No rash.   Neurological:      Mental Status: He is alert and oriented to person, place, and time.      Cranial Nerves: No cranial nerve deficit.      Motor: No abnormal muscle tone.   Psychiatric:         Behavior: Behavior normal.         Thought Content: Thought content normal.         Judgment: Judgment  normal.         Fluids    Intake/Output Summary (Last 24 hours) at 12/21/2020 1409  Last data filed at 12/21/2020 0800  Gross per 24 hour   Intake 720 ml   Output 875 ml   Net -155 ml       Laboratory  Recent Labs     12/20/20  0057 12/21/20  0233   WBC 9.8 8.0   RBC 4.02* 3.60*   HEMOGLOBIN 12.8* 11.5*   HEMATOCRIT 39.6* 35.0*   MCV 98.5* 97.2   MCH 31.8 31.9   MCHC 32.3* 32.9*   RDW 53.5* 52.2*   PLATELETCT 124* 165   MPV 13.7* 10.3     Recent Labs     12/20/20  0057 12/21/20  0233   SODIUM 135 133*   POTASSIUM 4.6 3.9   CHLORIDE 100 101   CO2 28 25   GLUCOSE 167* 130*   BUN 17 15   CREATININE 1.21 1.16   CALCIUM 9.0 8.7     Recent Labs     12/19/20  0035 12/20/20  0057 12/21/20  0233   INR 3.92* 3.75* 3.82*               Imaging  CT-EXTREMITY, LOWER W/O LEFT   Final Result      1.  Diffuse subcutaneous edema of the left lower extremity from the knee to the ankle consistent with cellulitis.      2.  No subcutaneous or intramuscular abscess identified on this unenhanced exam.      3.  No acute bony abnormality.      4.  No left knee or ankle effusion identified.      DX-CHEST-PORTABLE (1 VIEW)   Final Result         1.  Hazy right lung base opacity could represent subtle infiltrate versus atelectasis.   2.  Pulmonary vascular congestion and mild edema   3.  Cardiomegaly      US-EXTREMITY VENOUS LOWER UNILAT LEFT   Final Result      DX-CHEST-PORTABLE (1 VIEW)   Final Result      1.  There is an enlarged cardiac silhouette with probable mild vascular congestion and right lower lobe atelectasis with a trace of right pleural fluid.           Assessment/Plan  * Cellulitis of left lower extremity- (present on admission)  Assessment & Plan      Presented with left lower extremity swelling, erythema, tenderness.  No purulent discharge noted.   On admission SIRS 3/4 with leukocytosis, tachycardia and tachypnea.  Initially started broad-spectrum antibiotics with Unasyn and vancomycin.  Patient reports scratching his legs  repeatedly since past few days, denies previous history of infection, IV drug use, cat scratch.  LE doppler ultrasound negative for DVT.   1 out of 2 blood culture coag negative staph =contamination.  Repeat BCs 12/17 and 12/18 negative x 2.    12/20: Increased erythema of the left lower leg from tibia marked margin to above the knee extending into the thigh.  Increased pain swelling noted on exam.  Increased with redness.  Attempt to culture scab left tibia change doxycycline and Keflex to Unasyn IV and vancomycin IV CT left lower extremity no gas formation or abscess seen.  Monitor CBC BMP daily.  Given recent history of acute kidney injury on admission feel it is prudent to change patient's vancomycin to Zyvox prior to worsening renal function.  I have also started patient on Lasix for his bilateral leg edema likely this is how patient developed his secondary bacterial cellulitis.  12/21:  significant improvement on zyvox and IV unasyn.  Decreased erythema and edema noted.        Severe sepsis (HCC)  Assessment & Plan  Resolved  This is Severe Sepsis Present on admission  SIRS criteria identified on my evaluation include: Tachycardia, with heart rate greater than 90 BPM, Tachypnea, with respirations greater than 20 per minute and Leukocytosis, with WBC greater than 12,000  Source of infection is LLE cellulitis   Clinical indicators of end organ dysfunction include Systolic blood pressure (SBP) <90 mmHg or mean arterial pressure <65 mmHg and Lactate >2 mmol/L (18.0 mg/dL)  Sepsis protocol initiated  Fluid resuscitation ordered per protocol  IV antibiotics as appropriate for source of sepsis  Reassessment: I have reassessed the patient's hemodynamic status  End organ dysfunction include(s):  Acute kidney failure       Atypical chest pain- (present on admission)  Assessment & Plan  On presentation, reported intermittent sharp chest pain occurring at rest and on exertion associated with palpitations from past few  years.  On admission EKG showed atrial fibrillation with ventricular heart rate  110.,  No signs of ischemia/infarct.  Interval troponin stable in 20s.  Last MPI in November 2020 showed mild global hypokinesia with no evidence of infarct or reversible ischemia.  Currently patient has no chest pain.    Plan  Discontinued telemetry      Paroxysmal atrial fibrillation (HCC)- (present on admission)  Assessment & Plan  Continue home metoprolol.  PRN Metoprolol for HR > 140  Continue home warfarin.   Goal INR 2.5-3.5 give valve replacements (see below). Dosing per Pharmacy.      Bleeding hemorrhoid  Assessment & Plan  12/20:  +BM, history of hemorrhoids, developed BRBPR.  Hold coumadin, INR elevated.  12/21:  Resumed coumadin, no further bleeding.    Valvular cardiomyopathy (HCC)  Assessment & Plan  Bilateral LE edema noted with secondary cellulitis from scratching left lower leg.  Severe left atrial enlargement on echo with known history of bioprosthetic MVR.  Start lasix 20mg po daily with Kdur.    Pacemaker  Assessment & Plan  History of pacemaker placement   Continue to monitor on telemetry    COPD (chronic obstructive pulmonary disease) (HCC)- (present on admission)  Assessment & Plan  Not in acute exacerbation.    Not on home oxygen.   RT protocol.  PRN albuterol add for wheezing    Type 2 diabetes mellitus with complication, without long-term current use of insulin ([facility])- (present on admission)  Assessment & Plan  Hemoglobin A1c in Nov. 2020 - 7.6.  Hold home Metformin.  Continue correctional insulin.  Hypoglycemia protocol with Accuchecks.   Diabetic diet.    Essential hypertension, benign- (present on admission)  Assessment & Plan  On admission, SBP    During antihypertensive medications in the setting of sepsis.  Will restart when clinically indicated.      H/O mitral valve and aortic valve replacement- (present on admission)  Assessment & Plan  Along with PPM.   With h/o right lower extremity DVT in  2019.  TTE on 11/20/20 without valvular issues.    On admission subtherapeutic at 1.97  Continue home warfarin.  Dosing per Pharmacy.    Reviewed echo:  preserved EF, but severe left atrial dilation:  Ordered lasix 20mg daily with Kdur.       VTE prophylaxis: Therapeutic Coumadin

## 2020-12-21 NOTE — PROGRESS NOTES
Pt requests cough medicine. Has used available Tessalon. Hospitalist notified and ordered sugar free Robitussin.

## 2020-12-21 NOTE — ASSESSMENT & PLAN NOTE
12/20:  +BM, history of hemorrhoids, developed BRBPR.  Hold coumadin, INR elevated.  12/21:  Resumed coumadin, no further bleeding.

## 2020-12-21 NOTE — PROGRESS NOTES
Hospital Medicine Daily Progress Note    Date of Service  12/20/2020    Chief Complaint  65 y.o. male admitted 12/15/2020 with right leg cellulitis.    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  He states he has been itching and scratching his lower legs.  He developed secondary cellulitis to his left lower leg.  Review of echocardiogram demonstrates dilated left atrium severe.  Functioning bioprosthetic mitral valve prosthesis EF 55%.    Interval Problem Update  12/19: Patient seen and examined.  Review of chest x-ray with pulmonary edema noted.  Bilateral lower extremity edema also noted.  Patient has multiple scratch marks to his left lower leg with subsequent cellulitis with erythema noted margins marked.  Of note ultrasound of lower extremity negative for DVT.  He had been treated with IV antibiotics and is currently on oral doxycycline and oral Keflex.  He is currently coughing and short of breath.  Patient gio in atrial fibrillation with INR 3.92 therapeutic.  I have ordered Lasix 20 mg p.o. daily with potassium 20 mill equivalents daily.  Continue with current antibiotics.  I have also reviewed blood cultures from admission 12/15/2020 coag negative staph appears to be contaminant.  Repeat blood cultures 12/17 and 12/18 -x2.  12/20: Worsening cellulitis noted today with erythema from the upper tibia past the knee and progressing.  Warm to the touch and tender left knee.  Patient does have a history of gout however this appears to be cellulitis extension from prior cellulitis.  I have ordered a CT without contrast of the left lower extremity to rule out subcutaneous air.  I have also changed antibiotics from Keflex to doxycycline p.o. to vancomycin IV and Unasyn IV to cover for strep or staph.  Attempt culture left  shin scab.  I placed fluid restrictions on patient since he is coughing bilateral lower extremity edema from his severe left atrial dilation status post mitral valve replacement.  Patient also complaining of gout in his knee and ankle I have ordered colchicine 0.6 mg twice daily also of note patient had bright red blood per rectum from hemorrhoids after bowel movement this afternoon.  Hold Coumadin tonight.  Lasix 20 mg p.o. daily to continue at twice daily.  changed vancomycin to Zyvox for history of EBEN on admission and start of lasix.      Consultants/Specialty      Code Status  Full Code    Disposition  Home once medically improved.    Review of Systems  Review of Systems   Constitutional: Positive for malaise/fatigue. Negative for chills, diaphoresis and fever.   HENT: Negative for congestion and sore throat.    Eyes: Negative for pain and discharge.   Respiratory: Positive for cough and shortness of breath. Negative for hemoptysis, sputum production and wheezing.    Cardiovascular: Positive for leg swelling. Negative for chest pain, palpitations and claudication.   Gastrointestinal: Negative for abdominal pain, constipation, diarrhea, melena, nausea and vomiting.   Genitourinary: Negative for dysuria, frequency and urgency.   Musculoskeletal: Negative for back pain, joint pain, myalgias and neck pain.   Skin: Positive for itching and rash.   Neurological: Negative for dizziness, sensory change, speech change, focal weakness, loss of consciousness, weakness and headaches.   Endo/Heme/Allergies: Does not bruise/bleed easily.   Psychiatric/Behavioral: Negative for depression, substance abuse and suicidal ideas.        Physical Exam  Temp:  [36.1 °C (97 °F)-36.7 °C (98 °F)] 36.1 °C (97 °F)  Pulse:  [] 100  Resp:  [16-24] 19  BP: (101-122)/(66-82) 111/82  SpO2:  [90 %-92 %] 92 %    Physical Exam  Constitutional:       General: He is not in acute distress.     Appearance: He is not diaphoretic.   HENT:       Head: Normocephalic and atraumatic.      Mouth/Throat:      Pharynx: No oropharyngeal exudate.   Eyes:      General: No scleral icterus.        Right eye: No discharge.         Left eye: No discharge.      Conjunctiva/sclera: Conjunctivae normal.      Pupils: Pupils are equal, round, and reactive to light.   Neck:      Musculoskeletal: Normal range of motion and neck supple.      Thyroid: No thyromegaly.      Vascular: No JVD.      Trachea: No tracheal deviation.   Cardiovascular:      Rate and Rhythm: Normal rate and regular rhythm.      Heart sounds: No murmur. No friction rub. No gallop.       Comments: Bioprosthetic valve click heard.  Pulmonary:      Effort: Pulmonary effort is normal. No respiratory distress.      Breath sounds: Normal breath sounds. No wheezing or rales.   Chest:      Chest wall: No tenderness.   Abdominal:      General: Bowel sounds are normal. There is no distension.      Palpations: Abdomen is soft. There is no mass.      Tenderness: There is no abdominal tenderness. There is no guarding or rebound.   Musculoskeletal: Normal range of motion.         General: Swelling and tenderness present.      Right lower leg: Edema present.      Left lower leg: Edema present.   Lymphadenopathy:      Cervical: No cervical adenopathy.   Skin:     General: Skin is warm and dry.      Findings: Erythema (multiple scratch marks noted LLE with surrounding erythema/ cellulitis LLE.) present. No rash.   Neurological:      Mental Status: He is alert and oriented to person, place, and time.      Cranial Nerves: No cranial nerve deficit.      Motor: No abnormal muscle tone.   Psychiatric:         Behavior: Behavior normal.         Thought Content: Thought content normal.         Judgment: Judgment normal.         Fluids    Intake/Output Summary (Last 24 hours) at 12/20/2020 1805  Last data filed at 12/20/2020 1300  Gross per 24 hour   Intake 720 ml   Output 1020 ml   Net -300 ml       Laboratory  Recent Labs      12/18/20  0356 12/20/20  0057   WBC 10.2 9.8   RBC 3.85* 4.02*   HEMOGLOBIN 12.3* 12.8*   HEMATOCRIT 36.8* 39.6*   MCV 95.6 98.5*   MCH 31.9 31.8   MCHC 33.4* 32.3*   RDW 51.2* 53.5*   PLATELETCT 139* 124*   MPV 11.2 13.7*     Recent Labs     12/18/20  0356 12/20/20  0057   SODIUM 129* 135   POTASSIUM 3.7 4.6   CHLORIDE 101 100   CO2 24 28   GLUCOSE 145* 167*   BUN 23* 17   CREATININE 1.05 1.21   CALCIUM 8.5 9.0     Recent Labs     12/18/20  0356 12/19/20  0035 12/20/20  0057   INR 4.10* 3.92* 3.75*               Imaging  CT-EXTREMITY, LOWER W/O LEFT   Final Result      1.  Diffuse subcutaneous edema of the left lower extremity from the knee to the ankle consistent with cellulitis.      2.  No subcutaneous or intramuscular abscess identified on this unenhanced exam.      3.  No acute bony abnormality.      4.  No left knee or ankle effusion identified.      DX-CHEST-PORTABLE (1 VIEW)   Final Result         1.  Hazy right lung base opacity could represent subtle infiltrate versus atelectasis.   2.  Pulmonary vascular congestion and mild edema   3.  Cardiomegaly      US-EXTREMITY VENOUS LOWER UNILAT LEFT   Final Result      DX-CHEST-PORTABLE (1 VIEW)   Final Result      1.  There is an enlarged cardiac silhouette with probable mild vascular congestion and right lower lobe atelectasis with a trace of right pleural fluid.           Assessment/Plan  * Cellulitis of left lower extremity- (present on admission)  Assessment & Plan      Presented with left lower extremity swelling, erythema, tenderness.  No purulent discharge noted.   On admission SIRS 3/4 with leukocytosis, tachycardia and tachypnea.  Initially started broad-spectrum antibiotics with Unasyn and vancomycin.  Patient reports scratching his legs repeatedly since past few days, denies previous history of infection, IV drug use, cat scratch.  LE doppler ultrasound negative for DVT.   1 out of 2 blood culture coag negative staph =contamination.  Repeat BCs 12/17  and 12/18 negative x 2.    12/20: Increased erythema of the left lower leg from tibia marked margin to above the knee extending into the thigh.  Increased pain swelling noted on exam.  Increased with redness.  Attempt to culture scab left tibia change doxycycline and Keflex to Unasyn IV and vancomycin IV CT left lower extremity no gas formation or abscess seen.  Monitor CBC BMP daily.  Given recent history of acute kidney injury on admission feel it is prudent to change patient's vancomycin to Zyvox prior to worsening renal function.  I have also started patient on Lasix for his bilateral leg edema likely this is how patient developed his secondary bacterial cellulitis.        Severe sepsis (HCC)  Assessment & Plan  Resolved  This is Severe Sepsis Present on admission  SIRS criteria identified on my evaluation include: Tachycardia, with heart rate greater than 90 BPM, Tachypnea, with respirations greater than 20 per minute and Leukocytosis, with WBC greater than 12,000  Source of infection is LLE cellulitis   Clinical indicators of end organ dysfunction include Systolic blood pressure (SBP) <90 mmHg or mean arterial pressure <65 mmHg and Lactate >2 mmol/L (18.0 mg/dL)  Sepsis protocol initiated  Fluid resuscitation ordered per protocol  IV antibiotics as appropriate for source of sepsis  Reassessment: I have reassessed the patient's hemodynamic status  End organ dysfunction include(s):  Acute kidney failure       Atypical chest pain- (present on admission)  Assessment & Plan  On presentation, reported intermittent sharp chest pain occurring at rest and on exertion associated with palpitations from past few years.  On admission EKG showed atrial fibrillation with ventricular heart rate  110.,  No signs of ischemia/infarct.  Interval troponin stable in 20s.  Last MPI in November 2020 showed mild global hypokinesia with no evidence of infarct or reversible ischemia.  Currently patient has no chest  pain.    Plan  Discontinued telemetry      Paroxysmal atrial fibrillation (HCC)- (present on admission)  Assessment & Plan  Continue home metoprolol.  PRN Metoprolol for HR > 140  Continue home warfarin.   Goal INR 2.5-3.5 give valve replacements (see below). Dosing per Pharmacy.      Bleeding hemorrhoid  Assessment & Plan  12/20:  +BM, history of hemorrhoids, developed BRBPR.  Hold coumadin, INR elevated.    Valvular cardiomyopathy (HCC)  Assessment & Plan  Bilateral LE edema noted with secondary cellulitis from scratching left lower leg.  Severe left atrial enlargement on echo with known history of bioprosthetic MVR.  Start lasix 20mg po daily with Kdur.    Pacemaker  Assessment & Plan  History of pacemaker placement   Continue to monitor on telemetry    COPD (chronic obstructive pulmonary disease) ([facility])- (present on admission)  Assessment & Plan  Not in acute exacerbation.    Not on home oxygen.   RT protocol.  PRN albuterol add for wheezing    Type 2 diabetes mellitus with complication, without long-term current use of insulin ([facility])- (present on admission)  Assessment & Plan  Hemoglobin A1c in Nov. 2020 - 7.6.  Hold home Metformin.  Continue correctional insulin.  Hypoglycemia protocol with Accuchecks.   Diabetic diet.    Essential hypertension, benign- (present on admission)  Assessment & Plan  On admission, SBP    During antihypertensive medications in the setting of sepsis.  Will restart when clinically indicated.      H/O mitral valve and aortic valve replacement- (present on admission)  Assessment & Plan  Along with PPM.   With h/o right lower extremity DVT in 2019.  TTE on 11/20/20 without valvular issues.    On admission subtherapeutic at 1.97  Continue home warfarin.  Dosing per Pharmacy.    Reviewed echo:  preserved EF, but severe left atrial dilation:  Ordered lasix 20mg daily with Kdur.       VTE prophylaxis: Therapeutic Coumadin

## 2020-12-21 NOTE — PROGRESS NOTES
Inpatient Anticoagulation Service Note    Date: 12/21/2020    Reason for Anticoagulation: Aortic Mechanical Valve Replacement  , Mechanical Mitral Valve Replacement  , Deep Vein Thrombosis, Atrial Fibrillation   Target INR: 2.5 to 3.5  [id number] VASc Score: 3  HAS-BLED Score: 1   Hemoglobin Value: (!) 11.5  Hematocrit Value: (!) 35  Lab Platelet Value: 165    INR from last 7 days     Date/Time INR Value    12/21/20 0233  (!) 3.82    12/20/20 0057  (!) 3.75    12/19/20 0035  (!) 3.92    12/18/20 0356  (!) 4.1    12/17/20 0056  (!) 3.26    12/16/20 0245  (!) 2.2    12/15/20 1220  (!) 1.97        Dose from last 7 days     Date/Time Dose (mg)    12/21/20 1547  0    12/20/20 0800  0    12/19/20 1200  3    12/18/20 1423  2    12/17/20 1439  5    12/16/20 1312  5    12/16/20 1306  --    12/15/20 1842  7.5        Average Dose (mg): 2 mg Fridays and 5 mg all other days of the week  Significant Interactions: Antibiotics  Bridge Therapy: No   Reversal Agent Administered: Not Applicable    Comments: Warfarin dose was held last night. INR remains supratherapeutic today. Drop in H/H but w/out overt sxs of bleeding. Renal indices remain stable. Interaction with antibiotics noted.     Plan:  Will hold tonight's warfarin dose and obtain an INR with AM labs. Pharmacy will continue to follow.    Education Material Provided?: No  Pharmacist suggested discharge dosing: TBD based on subsequent INR results. May be able to resume home dose of  2 mg Fridays and 5 mg all other days of the week. Obtain a follow up INR within one week of discharge.        Laurel Patrick, PharmD, BCPS

## 2020-12-21 NOTE — PROGRESS NOTES
Inpatient Anticoagulation Service Note    Date: 12/20/2020    Reason for Anticoagulation: Aortic Mechanical Valve Replacement  , Mechanical Mitral Valve Replacement  , Deep Vein Thrombosis, Atrial Fibrillation   Target INR: 2.5 to 3.5  [id number] VASc Score: 3  HAS-BLED Score: 1   Hemoglobin Value: (!) 12.8  Hematocrit Value: (!) 39.6  Lab Platelet Value: (!) 124    INR from last 7 days     Date/Time INR Value    12/20/20 0057  (!) 3.75    12/19/20 0035  (!) 3.92    12/18/20 0356  (!) 4.1    12/17/20 0056  (!) 3.26    12/16/20 0245  (!) 2.2    12/15/20 1220  (!) 1.97        Dose from last 7 days     Date/Time Dose (mg)    12/20/20 0800  4    12/19/20 1200  3    12/18/20 1423  2    12/17/20 1439  5    12/16/20 1312  5    12/16/20 1306  --    12/15/20 1842  7.5        Average Dose (mg): (Home dose: warfarin 2 mg Fridays and 5 mg all other days)  Significant Interactions: Antibiotics  Bridge Therapy: No    Reversal Agent Administered: Not Applicable  Comments: 64 y/o M  presented 12/15/2020 with complaint of left leg swelling x1 week.  PMHx Afib on warfarin,  T2DM, HTN, and h/o mitral & aortic valve replacement. DDI noted . No documented overt signs of bleeding.  INR above goal , decreased over interval.    Plan:  HOLDING warfarin dose per MD d/t bright red blood from hemorrhoids following bowel movement this afternoon. 4 mg dose scheduled for 12/21, INR in AM.  Education Material Provided?: No  Pharmacist suggested discharge dosing: Resume home regimen:Warfarin 2 mg Fridays and 5 mg all other days. Follow up with anticoagulatin clinic after discharge.         Get Calderon, PharmD

## 2020-12-21 NOTE — PROGRESS NOTES
Report received and assumed care of patient. Patient sitting up in bed with no distress noted. Patient picking at LLE and scant amount of blood noted on sheets. Educated patient to not pick at wound, patient states he would stop. Call bell in reach and safety measures in place.

## 2020-12-22 NOTE — PROGRESS NOTES
Assumed care of patient at bedside report from NOC RN. Updated on POC. Patient currently sleeping without signs or symptoms of discomfort or distress; on room air; up x1 assist. Call light within reach. Fall precautions in place. Bed locked and in lowest position. No other needs indicated at this time.

## 2020-12-22 NOTE — PROGRESS NOTES
"Pt a&o x4. On RA. Respirations equal and unlabored. Nonproductive cough. Complaints of leg soreness. Denies pain med needs.  Sitting at side of bed. Up SBA per report. Repositions self in bed. LLE pink. States \"it looks better\".  Good po intake without any s/sx of aspiration noted. BIANCHI.  Plan of care reviewed including: IV antibiotics, pain management, labs, medications and vitals frequency. Verbalized understanding and agrees. Instructed to use call light prior to getting oob to prevent falls. Able to make needs known.  Call light within reach.   "

## 2020-12-22 NOTE — DOCUMENTATION QUERY
[facility]                                                                       Query Response Note      PATIENT:               AILYN MCINTOSH  ACCT #:                  [id number]  MRN:                     [id number]  :                      1955  ADMIT DATE:       12/15/2020 12:08 PM  DISCH DATE:          RESPONDING  PROVIDER #:        [id number]           QUERY TEXT:    Pulmonary Edema is documented in the Medical record.  Can the specific type of pulmonary edema be further specified.    NOTE:  If an appropriate response is not listed below, please respond with a new note      The patient's Clinical Indicators include:  Per  Progress Note: review of CXR with pulmonary edema noted  2020 Echo: EF 55%, grossly normal left ventricular systolic function   12/15 NT-proBNP 548    CXR: pulmonary vascular congestion and mild edema  Risk Factors: htn, atrial fib, COPD  Treatment: Lasix, metoprolol     Thank You,  Dennis Schmitt RN, BSN  Clinical    Connect via [facility]  Options provided:   -- Acute pulmonary edema - cardiac related, (please specify type and acuity of CHF, if applicable, or other cardiac condition)   -- Acute pulmonary edema- non cardiogenic   -- Chronic pulmonary edema - cardiac related, (please specify type and acuity of CHF, if applicable, or other cardiac condition)   -- Chronic pulmonary edema - non cardiogenic   -- Findings of no clinical significance   -- Unable to determine      Query created by: Dennis Schmitt on 2020 2:37 PM    RESPONSE TEXT:    Acute pulmonary edema- non cardiogenic          Electronically signed by:  HOLLAND VIDAL MD 2020 4:25 PM

## 2020-12-22 NOTE — CARE PLAN
Problem: Safety  Goal: Will remain free from injury  Outcome: PROGRESSING AS EXPECTED  Note: Instructed call light use and staff assist when oob to prevent fall. Precautions in place. Calls appropriately.      Problem: Respiratory:  Goal: Respiratory status will improve  Note: Denies SOB. He does have expiratory wheezing and dry nonproductive cough. Albuterol given as ordered.

## 2020-12-22 NOTE — CARE PLAN
Problem: Communication  Goal: The ability to communicate needs accurately and effectively will improve  Outcome: PROGRESSING AS EXPECTED  Intervention: Educate patient and significant other/support system about the plan of care, procedures, treatments, medications and allow for questions  Note: Discussed daily POC. Discussed MD rounding. Pt asked appropriate questions. Pt verbalized understanding.       Problem: Knowledge Deficit  Goal: Knowledge of the prescribed therapeutic regimen will improve  Outcome: PROGRESSING AS EXPECTED  Note: Discussed PRN medications. Pt verbalized understanding of education provided

## 2020-12-22 NOTE — PROGRESS NOTES
[facility] Medicine Daily Progress Note    Date of Service  12/22/2020    Chief Complaint  65 y.o. male admitted 12/15/2020 with right leg cellulitis.    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  He states he has been itching and scratching his lower legs.  He developed secondary cellulitis to his left lower leg.  Review of echocardiogram demonstrates dilated left atrium severe.  Functioning bioprosthetic mitral valve prosthesis EF 55%. Patient admitted and started on antibiotics for left lower extremity cellulitis. Patient also noted to have shortness of breath with LE edema and pulmonary edema noted on CXR, started on lasix. LE venous US showed no DVT. Cellulitis worsened and patient changed to IV abx with MRSA coverage. Leg pain also worsened by gout in his knee and ankle started on colchicine. Patient had an episode of bright red blood per rectum from hemorrhoids which has not recurred.     Interval Problem Update  Patient continues to have dry cough. Reports his leg is improving but still painful and warm to touch. Now able to walk with a walker.     Consultants/Specialty  none    Code Status  Full Code    Disposition  Home once medically improved.    Review of Systems  Review of Systems   Constitutional: Positive for malaise/fatigue. Negative for chills, diaphoresis and fever.   HENT: Negative for congestion and sore throat.    Eyes: Negative for pain and discharge.   Respiratory: Positive for cough. Negative for sputum production and shortness of breath.    Cardiovascular: Positive for leg swelling. Negative for chest pain and palpitations.   Gastrointestinal: Negative for abdominal pain, constipation, diarrhea, nausea and vomiting.   Genitourinary: Negative for dysuria, frequency and urgency.   Musculoskeletal:  Negative for back pain and myalgias.   Skin: Positive for rash.   Neurological: Negative for dizziness, weakness and headaches.   Endo/Heme/Allergies: Does not bruise/bleed easily.   Psychiatric/Behavioral: Negative for depression, substance abuse and suicidal ideas.        Physical Exam  Temp:  [36.8 °C (98.2 °F)-37.3 °C (99.1 °F)] 37.3 °C (99.1 °F)  Pulse:  [] 81  Resp:  [16-19] 16  BP: (120-143)/(76-94) 127/81  SpO2:  [90 %-94 %] 92 %    Physical Exam  Constitutional:       General: He is not in acute distress.     Appearance: He is not diaphoretic.   HENT:      Head: Normocephalic and atraumatic.   Eyes:      Conjunctiva/sclera: Conjunctivae normal.      Pupils: Pupils are equal, round, and reactive to light.   Neck:      Musculoskeletal: Normal range of motion and neck supple.      Thyroid: No thyromegaly.      Vascular: No JVD.      Trachea: No tracheal deviation.   Cardiovascular:      Rate and Rhythm: Normal rate and regular rhythm.      Heart sounds: No murmur. No friction rub. No gallop.       Comments: Bioprosthetic valve click heard.  Pulmonary:      Effort: Pulmonary effort is normal. No respiratory distress.      Breath sounds: Normal breath sounds. No wheezing.   Abdominal:      General: Bowel sounds are normal. There is no distension.      Palpations: Abdomen is soft.      Tenderness: There is no abdominal tenderness. There is no guarding.   Musculoskeletal: Normal range of motion.         General: Swelling and tenderness present.      Right lower leg: Edema (mild) present.      Left lower leg: Edema (mild) present.   Skin:     General: Skin is warm and dry.      Findings: Erythema (multiple scratch marks noted LLE with surrounding erythema/ cellulitis LLE.) present. No rash.   Neurological:      Mental Status: He is alert and oriented to person, place, and time. Mental status is at baseline.      Cranial Nerves: No cranial nerve deficit.      Sensory: No sensory deficit.      Motor: No  abnormal muscle tone.   Psychiatric:         Mood and Affect: Mood normal.         Behavior: Behavior normal.         Fluids    Intake/Output Summary (Last 24 hours) at 12/22/2020 1250  Last data filed at 12/22/2020 0815  Gross per 24 hour   Intake 680 ml   Output 1150 ml   Net -470 ml       Laboratory  Recent Labs     12/20/20 0057 12/21/20 0233 12/22/20 0229   WBC 9.8 8.0 7.6   RBC 4.02* 3.60* 3.65*   HEMOGLOBIN 12.8* 11.5* 11.8*   HEMATOCRIT 39.6* 35.0* 35.1*   MCV 98.5* 97.2 96.2   MCH 31.8 31.9 32.3   MCHC 32.3* 32.9* 33.6*   RDW 53.5* 52.2* 52.1*   PLATELETCT 124* 165 175   MPV 13.7* 10.3 10.2     Recent Labs     12/20/20 0057 12/21/20 0233 12/22/20 0229   SODIUM 135 133* 134*   POTASSIUM 4.6 3.9 4.0   CHLORIDE 100 101 101   CO2 28 25 26   GLUCOSE 167* 130* 114*   BUN 17 15 12   CREATININE 1.21 1.16 1.14   CALCIUM 9.0 8.7 8.6     Recent Labs     12/20/20 0057 12/21/20 0233 12/22/20 0229   INR 3.75* 3.82* 3.18*               Imaging  CT-EXTREMITY, LOWER W/O LEFT   Final Result      1.  Diffuse subcutaneous edema of the left lower extremity from the knee to the ankle consistent with cellulitis.      2.  No subcutaneous or intramuscular abscess identified on this unenhanced exam.      3.  No acute bony abnormality.      4.  No left knee or ankle effusion identified.      DX-CHEST-PORTABLE (1 VIEW)   Final Result         1.  Hazy right lung base opacity could represent subtle infiltrate versus atelectasis.   2.  Pulmonary vascular congestion and mild edema   3.  Cardiomegaly      US-EXTREMITY VENOUS LOWER UNILAT LEFT   Final Result      DX-CHEST-PORTABLE (1 VIEW)   Final Result      1.  There is an enlarged cardiac silhouette with probable mild vascular congestion and right lower lobe atelectasis with a trace of right pleural fluid.           Assessment/Plan  * Cellulitis of left lower extremity- (present on admission)  Assessment & Plan      Presented with left lower extremity swelling, erythema,  tenderness.  No purulent discharge noted.   On admission SIRS 3/4 with leukocytosis, tachycardia and tachypnea.  Initially started broad-spectrum antibiotics with Unasyn and vancomycin.  Patient reports scratching his legs repeatedly since past few days, denies previous history of infection, IV drug use, cat scratch.  LE doppler ultrasound negative for DVT.   1 out of 2 blood culture coag negative staph =contamination.  Repeat BCs 12/17 and 12/18 negative x 2.    12/20: Increased erythema of the left lower leg from tibia marked margin to above the knee extending into the thigh.  Increased pain swelling noted on exam.  Increased with redness.  Attempt to culture scab left tibia change doxycycline and Keflex to Unasyn IV and vancomycin IV CT left lower extremity no gas formation or abscess seen.  Monitor CBC BMP daily.  Given recent history of acute kidney injury on admission feel it is prudent to change patient's vancomycin to Zyvox prior to worsening renal function.  I have also started patient on Lasix for his bilateral leg edema likely this is how patient developed his secondary bacterial cellulitis.  12/21:  significant improvement on zyvox and IV unasyn.  Decreased erythema and edema noted.    Continue abx     Severe sepsis (HCC)  Assessment & Plan  Resolved  This is Severe Sepsis Present on admission  SIRS criteria identified on my evaluation include: Tachycardia, with heart rate greater than 90 BPM, Tachypnea, with respirations greater than 20 per minute and Leukocytosis, with WBC greater than 12,000  Source of infection is LLE cellulitis   Clinical indicators of end organ dysfunction include Systolic blood pressure (SBP) <90 mmHg or mean arterial pressure <65 mmHg and Lactate >2 mmol/L (18.0 mg/dL)  Sepsis protocol initiated  Fluid resuscitation ordered per protocol  IV antibiotics as appropriate for source of sepsis  Reassessment: I have reassessed the patient's hemodynamic status  End organ dysfunction  include(s):  Acute kidney failure       Atypical chest pain- (present on admission)  Assessment & Plan  On presentation, reported intermittent sharp chest pain occurring at rest and on exertion associated with palpitations from past few years.  On admission EKG showed atrial fibrillation with ventricular heart rate  110.,  No signs of ischemia/infarct.  Interval troponin stable in 20s.  Last MPI in November 2020 showed mild global hypokinesia with no evidence of infarct or reversible ischemia.  Resolved       Paroxysmal atrial fibrillation (HCC)- (present on admission)  Assessment & Plan  Continue home metoprolol.  PRN Metoprolol for HR > 140  Continue home warfarin.   Goal INR 2.5-3.5 give valve replacements (see below). Dosing per Pharmacy.      Bleeding hemorrhoid  Assessment & Plan  12/20:  +BM, history of hemorrhoids, developed BRBPR.  Hold coumadin, INR elevated.  12/21:  Resumed coumadin, no further bleeding.    Valvular cardiomyopathy (HCC)  Assessment & Plan  Bilateral LE edema noted with secondary cellulitis from scratching left lower leg.  Severe left atrial enlargement on echo with known history of bioprosthetic MVR.  Start lasix 20mg po daily with Kdur.    Pacemaker  Assessment & Plan  History of pacemaker placement   Continue to monitor on telemetry    COPD (chronic obstructive pulmonary disease) (HCC)- (present on admission)  Assessment & Plan  Not in acute exacerbation.    Not on home oxygen.   RT protocol.  PRN albuterol add for wheezing    Type 2 diabetes mellitus with complication, without long-term current use of insulin (HCC)- (present on admission)  Assessment & Plan  Hemoglobin A1c in Nov. 2020 - 7.6.  Hold home Metformin.  Continue correctional insulin.  Hypoglycemia protocol with Accuchecks.   Diabetic diet.    Essential hypertension, benign- (present on admission)  Assessment & Plan  On admission, SBP    During antihypertensive medications in the setting of sepsis.  Will restart when  clinically indicated.      H/O mitral valve and aortic valve replacement- (present on admission)  Assessment & Plan  Along with PPM.   With h/o right lower extremity DVT in 2019.  TTE on 11/20/20 without valvular issues.    On admission subtherapeutic at 1.97  Continue home warfarin.  Dosing per Pharmacy.    Echo- preserved EF, but severe left atrial dilation:  Ordered lasix 20mg daily with Kdur.       VTE prophylaxis: Coumadin

## 2020-12-22 NOTE — THERAPY
Physical Therapy Contact Note    Patient Name: Morales Olivier  Age:  65 y.o., Sex:  male  Medical Record #: [id number]  Today's Date: 12/22/2020    Acknowledge PT order. Since admission, pt has had gradual improvement and is now able to mobilize to the BR and in the unit using a FWW. He is independent in his room to sit EOB to urinate and eat. He has a FWW and a cane at home; lives in a single story home w/ no stairs to enter. He has an RN who provides care 1x/wk and he is able to take a taxi to get groceries. He feels he is very near his baseline level of mobility and PT is not indicated.      Naila Vargas, PT  [phone]

## 2020-12-23 NOTE — PROGRESS NOTES
Inpatient Anticoagulation Service Note    Date: 12/23/2020    Reason for Anticoagulation: Aortic Mechanical Valve Replacement  , Mechanical Mitral Valve Replacement  , Deep Vein Thrombosis, Atrial Fibrillation   Target INR: 2.5 to 3.5  [id number] VASc Score: 3  HAS-BLED Score: 1   Hemoglobin Value: (!) 11.5  Hematocrit Value: (!) 35.7  Lab Platelet Value: 177    INR from last 7 days     Date/Time INR Value    12/23/20 0026  (!) 2.78    12/22/20 0229  (!) 3.18    12/21/20 0233  (!) 3.82    12/20/20 0057  (!) 3.75    12/19/20 0035  (!) 3.92    12/18/20 0356  (!) 4.1    12/17/20 0056  (!) 3.26        Dose from last 7 days     Date/Time Dose (mg)    12/23/20 0925  5    12/22/20 1624  5    12/21/20 1547  0    12/20/20 0800  0    12/19/20 1200  3    12/18/20 1423  2    12/17/20 1439  5    12/16/20 1312  5    12/16/20 1306  --        Average Dose (mg): 2 mg Fridays and 5 mg all other days of the week  Significant Interactions: Antibiotics  Bridge Therapy: No Reversal Agent Administered: Not Applicable    Comments: INR remains therapeutic today. H/H and renal indices remain stable. No overt sxs of bleeding. Interaction with antibiotics noted. All warfarin doses documented as administered.     Plan:  Will give warfarin 5 mg PO dose tonight and obtain an INR with AM labs. Pharmacy will continue to follow.    Education Material Provided?: No  Pharmacist suggested discharge dosing: TBD based on subsequent INR results. May be able to resume home dose of  2 mg Fridays and 5 mg all other days of the week. Obtain a follow up INR within one week of discharge.      Laurel Patrick, PharmD, BCPS

## 2020-12-23 NOTE — CARE PLAN
Problem: Communication  Goal: The ability to communicate needs accurately and effectively will improve  Outcome: PROGRESSING AS EXPECTED     Problem: Infection  Goal: Will remain free from infection  Outcome: PROGRESSING AS EXPECTED     Problem: Pain Management  Goal: Pain level will decrease to patient's comfort goal  Outcome: PROGRESSING AS EXPECTED     Problem: Respiratory:  Goal: Respiratory status will improve  Outcome: PROGRESSING AS EXPECTED

## 2020-12-23 NOTE — CARE PLAN
Problem: Bowel/Gastric:  Goal: Normal bowel function is maintained or improved  Outcome: PROGRESSING AS EXPECTED  Note: Pt having BM at regular intervals. Without abdominal discomfort or distension.      Problem: Pain Management  Goal: Pain level will decrease to patient's comfort goal  Outcome: PROGRESSING AS EXPECTED  Flowsheets (Taken 12/23/2020 0800)  Pain Rating Scale (NPRS): 6  Note: Pt with noted LE and back discomfort. Medicated per MAR. Within functional pain goal

## 2020-12-23 NOTE — PROGRESS NOTES
Inpatient Anticoagulation Service Note    Date: 12/22/2020    Reason for Anticoagulation: Aortic Mechanical Valve Replacement, Mechanical Mitral Valve Replacement, Deep Vein Thrombosis, Atrial Fibrillation   Target INR: 2.5 to 3.5  [id number] VASc Score: 3  HAS-BLED Score: 1   Hemoglobin Value: (!) 11.8  Hematocrit Value: (!) 35.1  Lab Platelet Value: 175    INR from last 7 days     Date/Time INR Value    12/22/20 0229  (!) 3.18    12/21/20 0233  (!) 3.82    12/20/20 0057  (!) 3.75    12/19/20 0035  (!) 3.92    12/18/20 0356  (!) 4.1    12/17/20 0056  (!) 3.26    12/16/20 0245  (!) 2.2        Dose from last 7 days     Date/Time Dose (mg)    12/22/20 1624  5    12/21/20 1547  0    12/20/20 0800  0    12/19/20 1200  3    12/18/20 1423  2    12/17/20 1439  5    12/16/20 1312  5    12/16/20 1306  --    12/15/20 1842  7.5        Average Dose (mg): 2 mg Fridays and 5 mg all other days of the week  Significant Interactions: Antibiotics  Bridge Therapy: No   Reversal Agent Administered: Not Applicable    Comments: INR therapeutic today after two days of held warfarin doses on 12/20 and 12/21. H/H and renal indices remain stable. No overt sxs of bleeding. Interaction with antibiotics noted.     Plan:  Will give warfarin 5 mg PO dose tonight and obtain an INR with AM labs. Pharmacy will continue to follow.    Education Material Provided?: No  Pharmacist suggested discharge dosing: TBD based on subsequent INR results. May be able to resume home dose of  2 mg Fridays and 5 mg all other days of the week. Obtain a follow up INR within one week of discharge.      Laurel Patrick, PharmD, BCPS

## 2020-12-23 NOTE — PROGRESS NOTES
Assumed care of patient at bedside report from NOC RN. Updated on POC. Patient currently A & O x 4; on room air; up x1 to standby assist; without complaints of acute pain. Call light within reach. Fall precautions in place. Bed locked and in lowest position. All questions answered. No other needs indicated at this time.

## 2020-12-23 NOTE — PROGRESS NOTES
Hospital Medicine Daily Progress Note    Date of Service  12/23/2020    Chief Complaint  65 y.o. male admitted 12/15/2020 with left leg cellulitis.    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  He states he has been itching and scratching his lower legs.  He developed secondary cellulitis to his left lower leg.  Review of echocardiogram demonstrates dilated left atrium severe.  Functioning bioprosthetic mitral valve prosthesis EF 55%. Patient admitted and started on antibiotics for left lower extremity cellulitis. Patient also noted to have shortness of breath with LE edema and pulmonary edema noted on CXR, started on lasix. LE venous US showed no DVT. Cellulitis worsened and patient changed to IV abx with MRSA coverage. Leg pain also worsened by gout in his knee and ankle started on colchicine. Patient had intermittent episodes of bright red blood per rectum from hemorrhoids, hemoglobin stable.    Interval Problem Update  No acute events overnight. Patient intermittently tachycardic, other vitals stable. Left leg erythema improving, patient continues to complain of itching but reports atarax is helping. Also reports cough is improving       Consultants/Specialty  none    Code Status  Full Code    Disposition  Home once medically improved.    Review of Systems  Review of Systems   Constitutional: Positive for malaise/fatigue. Negative for chills, diaphoresis and fever.   HENT: Negative for congestion and sore throat.    Eyes: Negative for pain and discharge.   Respiratory: Positive for cough. Negative for sputum production and shortness of breath.    Cardiovascular: Positive for leg swelling. Negative for chest pain and palpitations.   Gastrointestinal: Negative for abdominal pain, constipation, diarrhea, nausea and  vomiting.   Genitourinary: Negative for dysuria, frequency and urgency.   Musculoskeletal: Negative for back pain and myalgias.   Skin: Positive for rash.   Neurological: Negative for dizziness, weakness and headaches.   Endo/Heme/Allergies: Does not bruise/bleed easily.   Psychiatric/Behavioral: Negative for depression, substance abuse and suicidal ideas.        Physical Exam  Temp:  [36.4 °C (97.6 °F)-36.8 °C (98.2 °F)] 36.4 °C (97.6 °F)  Pulse:  [] 83  Resp:  [16-20] 17  BP: ()/(59-88) 131/75  SpO2:  [92 %-95 %] 93 %    Physical Exam  Constitutional:       General: He is not in acute distress.     Appearance: He is not diaphoretic.   HENT:      Head: Normocephalic and atraumatic.   Eyes:      Conjunctiva/sclera: Conjunctivae normal.      Pupils: Pupils are equal, round, and reactive to light.   Neck:      Musculoskeletal: Normal range of motion and neck supple.      Thyroid: No thyromegaly.      Vascular: No JVD.      Trachea: No tracheal deviation.   Cardiovascular:      Rate and Rhythm: Normal rate and regular rhythm.      Heart sounds: No murmur. No friction rub. No gallop.       Comments: Bioprosthetic valve click heard.  Pulmonary:      Effort: Pulmonary effort is normal. No respiratory distress.      Breath sounds: Normal breath sounds. No wheezing.   Abdominal:      General: Bowel sounds are normal. There is no distension.      Palpations: Abdomen is soft.      Tenderness: There is no abdominal tenderness. There is no guarding.   Musculoskeletal: Normal range of motion.         General: Swelling and tenderness present.      Right lower leg: Edema (mild) present.      Left lower leg: Edema (mild) present.   Skin:     General: Skin is warm and dry.      Findings: Erythema (multiple scratch marks noted LLE with surrounding erythema/ cellulitis LLE, improving ) present. No rash.   Neurological:      Mental Status: He is alert and oriented to person, place, and time. Mental status is at baseline.       Cranial Nerves: No cranial nerve deficit.      Sensory: No sensory deficit.      Motor: No abnormal muscle tone.   Psychiatric:         Mood and Affect: Mood normal.         Behavior: Behavior normal.         Fluids    Intake/Output Summary (Last 24 hours) at 12/23/2020 1314  Last data filed at 12/23/2020 0824  Gross per 24 hour   Intake 1090 ml   Output 900 ml   Net 190 ml       Laboratory  Recent Labs     12/21/20 0233 12/22/20 0229 12/23/20  0026   WBC 8.0 7.6 6.3   RBC 3.60* 3.65* 3.69*   HEMOGLOBIN 11.5* 11.8* 11.5*   HEMATOCRIT 35.0* 35.1* 35.7*   MCV 97.2 96.2 96.7   MCH 31.9 32.3 31.2   MCHC 32.9* 33.6* 32.2*   RDW 52.2* 52.1* 52.6*   PLATELETCT 165 175 177   MPV 10.3 10.2 10.4     Recent Labs     12/21/20 0233 12/22/20 0229 12/23/20  0026   SODIUM 133* 134* 136   POTASSIUM 3.9 4.0 4.1   CHLORIDE 101 101 103   CO2 25 26 25   GLUCOSE 130* 114* 104*   BUN 15 12 11   CREATININE 1.16 1.14 1.15   CALCIUM 8.7 8.6 8.5     Recent Labs     12/21/20 0233 12/22/20 0229 12/23/20  0026   INR 3.82* 3.18* 2.78*               Imaging  CT-EXTREMITY, LOWER W/O LEFT   Final Result      1.  Diffuse subcutaneous edema of the left lower extremity from the knee to the ankle consistent with cellulitis.      2.  No subcutaneous or intramuscular abscess identified on this unenhanced exam.      3.  No acute bony abnormality.      4.  No left knee or ankle effusion identified.      DX-CHEST-PORTABLE (1 VIEW)   Final Result         1.  Hazy right lung base opacity could represent subtle infiltrate versus atelectasis.   2.  Pulmonary vascular congestion and mild edema   3.  Cardiomegaly      US-EXTREMITY VENOUS LOWER UNILAT LEFT   Final Result      DX-CHEST-PORTABLE (1 VIEW)   Final Result      1.  There is an enlarged cardiac silhouette with probable mild vascular congestion and right lower lobe atelectasis with a trace of right pleural fluid.           Assessment/Plan  * Cellulitis of left lower extremity- (present on  admission)  Assessment & Plan      Presented with left lower extremity swelling, erythema, tenderness.  No purulent discharge noted.   On admission SIRS 3/4 with leukocytosis, tachycardia and tachypnea.  Initially started broad-spectrum antibiotics with Unasyn and vancomycin.  Patient reports scratching his legs repeatedly since past few days, denies previous history of infection, IV drug use, cat scratch.  LE doppler ultrasound negative for DVT.   1 out of 2 blood culture coag negative staph =contamination.  Repeat BCs 12/17 and 12/18 negative x 2.    12/20: Increased erythema of the left lower leg from tibia marked margin to above the knee extending into the thigh.  Increased pain swelling noted on exam.  Increased with redness.  Attempt to culture scab left tibia change doxycycline and Keflex to Unasyn IV and vancomycin IV CT left lower extremity no gas formation or abscess seen.  Monitor CBC BMP daily.  Given recent history of acute kidney injury on admission feel it is prudent to change patient's vancomycin to Zyvox prior to worsening renal function.  I have also started patient on Lasix for his bilateral leg edema likely this is how patient developed his secondary bacterial cellulitis.  12/21:  significant improvement on zyvox and IV unasyn.  Decreased erythema and edema noted.    Continue abx     Severe sepsis (HCC)  Assessment & Plan  Resolved  This is Severe Sepsis Present on admission  SIRS criteria identified on my evaluation include: Tachycardia, with heart rate greater than 90 BPM, Tachypnea, with respirations greater than 20 per minute and Leukocytosis, with WBC greater than 12,000  Source of infection is LLE cellulitis   Clinical indicators of end organ dysfunction include Systolic blood pressure (SBP) <90 mmHg or mean arterial pressure <65 mmHg and Lactate >2 mmol/L (18.0 mg/dL)  Sepsis protocol initiated  Fluid resuscitation ordered per protocol  IV antibiotics as appropriate for source of  sepsis  Reassessment: I have reassessed the patient's hemodynamic status  End organ dysfunction include(s):  Acute kidney failure       Atypical chest pain- (present on admission)  Assessment & Plan  On presentation, reported intermittent sharp chest pain occurring at rest and on exertion associated with palpitations from past few years.  On admission EKG showed atrial fibrillation with ventricular heart rate  110.,  No signs of ischemia/infarct.  Interval troponin stable in 20s.  Last MPI in November 2020 showed mild global hypokinesia with no evidence of infarct or reversible ischemia.  Resolved       Paroxysmal atrial fibrillation (HCC)- (present on admission)  Assessment & Plan  Continue home metoprolol.  PRN Metoprolol for HR > 140  Continue home warfarin.   Goal INR 2.5-3.5 give valve replacements (see below). Dosing per Pharmacy.      Bleeding hemorrhoid  Assessment & Plan  12/20:  +BM, history of hemorrhoids, developed BRBPR.  Hold coumadin, INR elevated.  12/21:  Resumed coumadin, no further bleeding.    Valvular cardiomyopathy (HCC)  Assessment & Plan  Bilateral LE edema noted with secondary cellulitis from scratching left lower leg.  Severe left atrial enlargement on echo with known history of bioprosthetic MVR.  Start lasix 20mg po daily with Kdur.    Pacemaker  Assessment & Plan  History of pacemaker placement   Continue to monitor on telemetry    COPD (chronic obstructive pulmonary disease) (HCC)- (present on admission)  Assessment & Plan  Not in acute exacerbation.    Not on home oxygen.   RT protocol.  PRN albuterol add for wheezing    Type 2 diabetes mellitus with complication, without long-term current use of insulin (HCC)- (present on admission)  Assessment & Plan  Hemoglobin A1c in Nov. 2020 - 7.6.  Hold home Metformin.  Continue correctional insulin.  Hypoglycemia protocol with Accuchecks.   Diabetic diet.    Essential hypertension, benign- (present on admission)  Assessment & Plan  On  admission, SBP    During antihypertensive medications in the setting of sepsis.  Will restart when clinically indicated.      H/O mitral valve and aortic valve replacement- (present on admission)  Assessment & Plan  Along with PPM.   With h/o right lower extremity DVT in 2019.  TTE on 11/20/20 without valvular issues.    On admission subtherapeutic at 1.97  Continue home warfarin.  Dosing per Pharmacy.    Echo- preserved EF, but severe left atrial dilation:  Ordered lasix 20mg daily with Kdur.       VTE prophylaxis: Coumadin

## 2020-12-24 PROBLEM — A41.9 SEVERE SEPSIS (HCC): Status: RESOLVED | Noted: 2020-01-01 | Resolved: 2020-01-01

## 2020-12-24 PROBLEM — K64.9 BLEEDING HEMORRHOID: Status: RESOLVED | Noted: 2020-01-01 | Resolved: 2020-01-01

## 2020-12-24 PROBLEM — R65.20 SEVERE SEPSIS (HCC): Status: RESOLVED | Noted: 2020-01-01 | Resolved: 2020-01-01

## 2020-12-24 PROBLEM — R07.9 CHEST PAIN: Status: RESOLVED | Noted: 2018-02-24 | Resolved: 2020-01-01

## 2020-12-24 NOTE — PROGRESS NOTES
Pt discharged home via taxi. Discharge teaching completed, questions/concerns addressed, and all belongings sent with pt.

## 2020-12-24 NOTE — DISCHARGE INSTRUCTIONS
Discharge Instructions    Discharged to home by taxi with self. Discharged via wheelchair, hospital escort: Yes.  Special equipment needed: Not Applicable    Be sure to schedule a follow-up appointment with your primary care doctor or any specialists as instructed.     Discharge Plan:   Diet Plan: Discussed  Activity Level: Discussed  Confirmed Symptoms Management: Discussed  Medication Reconciliation Updated: Yes    I understand that a diet low in cholesterol, fat, and sodium is recommended for good health. Unless I have been given specific instructions below for another diet, I accept this instruction as my diet prescription.   Other diet: DIABETIC    Special Instructions: None    · Is patient discharged on Warfarin / Coumadin?   Yes    You are receiving the drug warfarin. Please understand the importance of monitoring warfarin with scheduled PT/INR blood draws.  Follow-up with the [facility] in one week for INR lab.    IMPORTANT: HOW TO USE THIS INFORMATION:  This is a summary and does NOT have all possible information about this product. This information does not assure that this product is safe, effective, or appropriate for you. This information is not individual medical advice and does not substitute for the advice of your health care professional. Always ask your health care professional for complete information about this product and your specific health needs.      WARFARIN - ORAL (WARF-uh-rin)      COMMON BRAND NAME(S): Coumadin      WARNING:  Warfarin can cause very serious (possibly fatal) bleeding. This is more likely to occur when you first start taking this medication or if you take too much warfarin. To decrease your risk for bleeding, your doctor or other health care provider will monitor you closely and check your lab results (INR test) to make sure you are not taking too much warfarin. Keep all medical and laboratory appointments. Tell your doctor right away if you notice any signs of  "serious bleeding. See also Side Effects section.      USES:  This medication is used to treat blood clots (such as in deep vein thrombosis-DVT or pulmonary embolus-PE) and/or to prevent new clots from forming in your body. Preventing harmful blood clots helps to reduce the risk of a stroke or heart attack. Conditions that increase your risk of developing blood clots include a certain type of irregular heart rhythm (atrial fibrillation), heart valve replacement, recent heart attack, and certain surgeries (such as hip/knee replacement). Warfarin is commonly called a \"blood thinner,\" but the more correct term is \"anticoagulant.\" It helps to keep blood flowing smoothly in your body by decreasing the amount of certain substances (clotting proteins) in your blood.      HOW TO USE:  Read the Medication Guide provided by your pharmacist before you start taking warfarin and each time you get a refill. If you have any questions, ask your doctor or pharmacist. Take this medication by mouth with or without food as directed by your doctor or other health care professional, usually once a day. It is very important to take it exactly as directed. Do not increase the dose, take it more frequently, or stop using it unless directed by your doctor. Dosage is based on your medical condition, laboratory tests (such as INR), and response to treatment. Your doctor or other health care provider will monitor you closely while you are taking this medication to determine the right dose for you. Use this medication regularly to get the most benefit from it. To help you remember, take it at the same time each day. It is important to eat a balanced, consistent diet while taking warfarin. Some foods can affect how warfarin works in your body and may affect your treatment and dose. Avoid sudden large increases or decreases in your intake of foods high in vitamin K (such as broccoli, cauliflower, cabbage, brussels sprouts, kale, spinach, and other " green leafy vegetables, liver, green tea, certain vitamin supplements). If you are trying to lose weight, check with your doctor before you try to go on a diet. Cranberry products may also affect how your warfarin works. Limit the amount of cranberry juice (16 ounces/480 milliliters a day) or other cranberry products you may drink or eat.      SIDE EFFECTS:  Nausea, loss of appetite, or stomach/abdominal pain may occur. If any of these effects persist or worsen, tell your doctor or pharmacist promptly. Remember that your doctor has prescribed this medication because he or she has judged that the benefit to you is greater than the risk of side effects. Many people using this medication do not have serious side effects. This medication can cause serious bleeding if it affects your blood clotting proteins too much (shown by unusually high INR lab results). Even if your doctor stops your medication, this risk of bleeding can continue for up to a week. Tell your doctor right away if you have any signs of serious bleeding, including: unusual pain/swelling/discomfort, unusual/easy bruising, prolonged bleeding from cuts or gums, persistent/frequent nosebleeds, unusually heavy/prolonged menstrual flow, pink/dark urine, coughing up blood, vomit that is bloody or looks like coffee grounds, severe headache, dizziness/fainting, unusual or persistent tiredness/weakness, bloody/black/tarry stools, chest pain, shortness of breath, difficulty swallowing. Tell your doctor right away if any of these unlikely but serious side effects occur: persistent nausea/vomiting, severe stomach/abdominal pain, yellowing eyes/skin. This drug rarely has caused very serious (possibly fatal) problems if its effects lead to small blood clots (usually at the beginning of treatment). This can lead to severe skin/tissue damage that may require surgery or amputation if left untreated. Patients with certain blood conditions (protein C or S deficiency) may  be at greater risk. Get medical help right away if any of these rare but serious side effects occur: painful/red/purplish patches on the skin (such as on the toe, breast, abdomen), change in the amount of urine, vision changes, confusion, slurred speech, weakness on one side of the body. A very serious allergic reaction to this drug is rare. However, get medical help right away if you notice any symptoms of a serious allergic reaction, including: rash, itching/swelling (especially of the face/tongue/throat), severe dizziness, trouble breathing. This is not a complete list of possible side effects. If you notice other effects not listed above, contact your doctor or pharmacist. In the [place] - Call your doctor for medical advice about side effects. You may report side effects to FDA at 3-161-DVM-5294. In [place] - Call your doctor for medical advice about side effects. You may report side effects to [facility] [place] at [phone].      PRECAUTIONS:  Before taking warfarin, tell your doctor or pharmacist if you are allergic to it; or if you have any other allergies. This product may contain inactive ingredients, which can cause allergic reactions or other problems. Talk to your pharmacist for more details. Before using this medication, tell your doctor or pharmacist your medical history, especially of: blood disorders (such as anemia, hemophilia), bleeding problems (such as bleeding of the stomach/intestines, bleeding in the brain), blood vessel disorders (such as aneurysms), recent major injury/surgery, liver disease, alcohol use, mental/mood disorders (including memory problems), frequent falls/injuries. It is important that all your doctors and dentists know that you take warfarin. Before having surgery or any medical/dental procedures, tell your doctor or dentist that you are taking this medication and about all the products you use (including prescription drugs, nonprescription drugs, and herbal products). Avoid  getting injections into the muscles. If you must have an injection into a muscle (for example, a flu shot), it should be given in the arm. This way, it will be easier to check for bleeding and/or apply pressure bandages. This medication may cause stomach bleeding. Daily use of alcohol while using this medicine will increase your risk for stomach bleeding and may also affect how this medication works. Limit or avoid alcoholic beverages. If you have not been eating well, if you have an illness or infection that causes fever, vomiting, or diarrhea for more than 2 days, or if you start using any antibiotic medications, contact your doctor or pharmacist immediately because these conditions can affect how warfarin works. This medication can cause heavy bleeding. To lower the chance of getting cut, bruised, or injured, use great caution with sharp objects like safety razors and nail cutters. Use an electric razor when shaving and a soft toothbrush when brushing your teeth. Avoid activities such as contact sports. If you fall or injure yourself, especially if you hit your head, call your doctor immediately. Your doctor may need to check you. The Food & Drug Administration has stated that generic warfarin products are interchangeable. However, consult your doctor or pharmacist before switching warfarin products. Be careful not to take more than one medication that contains warfarin unless specifically directed by the doctor or health care provider who is monitoring your warfarin treatment. Older adults may be at greater risk for bleeding while using this drug. This medication is not recommended for use during pregnancy because of serious (possibly fatal) harm to an unborn baby. Discuss the use of reliable forms of birth control with your doctor. If you become pregnant or think you may be pregnant, tell your doctor immediately. If you are planning pregnancy, discuss a plan for managing your condition with your doctor before  "you become pregnant. Your doctor may switch the type of medication you use during pregnancy. Very small amounts of this medication may pass into breast milk but is unlikely to harm a nursing infant. Consult your doctor before breast-feeding.      DRUG INTERACTIONS:  Drug interactions may change how your medications work or increase your risk for serious side effects. This document does not contain all possible drug interactions. Keep a list of all the products you use (including prescription/nonprescription drugs and herbal products) and share it with your doctor and pharmacist. Do not start, stop, or change the dosage of any medicines without your doctor's approval. Warfarin interacts with many prescription, nonprescription, vitamin, and herbal products. This includes medications that are applied to the skin or inside the vagina or rectum. The interactions with warfarin usually result in an increase or decrease in the \"blood-thinning\" (anticoagulant) effect. Your doctor or other health care professional should closely monitor you to prevent serious bleeding or clotting problems. While taking warfarin, it is very important to tell your doctor or pharmacist of any changes in medications, vitamins, or herbal products that you are taking. Some products that may interact with this drug include: capecitabine, imatinib, mifepristone. Aspirin, aspirin-like drugs (salicylates), and nonsteroidal anti-inflammatory drugs (NSAIDs such as ibuprofen, naproxen, celecoxib) may have effects similar to warfarin. These drugs may increase the risk of bleeding problems if taken during treatment with warfarin. Carefully check all prescription/nonprescription product labels (including drugs applied to the skin such as pain-relieving creams) since the products may contain NSAIDs or salicylates. Talk to your doctor about using a different medication (such as acetaminophen) to treat pain/fever. Low-dose aspirin and related drugs (such as " clopidogrel, ticlopidine) should be continued if prescribed by your doctor for specific medical reasons such as heart attack or stroke prevention. Consult your doctor or pharmacist for more details. Many herbal products interact with warfarin. Tell your doctor before taking any herbal products, especially bromelains, coenzyme Q10, cranberry, danshen, dong quai, fenugreek, garlic, ginkgo biloba, ginseng, and Thong's wort, among others. This medication may interfere with a certain laboratory test to measure theophylline levels, possibly causing false test results. Make sure laboratory personnel and all your doctors know you use this drug.      OVERDOSE:  If overdose is suspected, contact a poison control center or emergency room immediately. [place] residents can call the [place] National Poison Hotline at 1-277.333.8963. [place] residents can call a provincial poison control center. Symptoms of overdose may include: bloody/black/tarry stools, pink/dark urine, unusual/prolonged bleeding.      NOTES:  Do not share this medication with others. Laboratory and/or medical tests (such as INR, complete blood count) must be performed periodically to monitor your progress or check for side effects. Consult your doctor for more details.      MISSED DOSE:  For the best possible benefit, do not miss any doses. If you do miss a dose and remember on the same day, take it as soon as you remember. If you remember on the next day, skip the missed dose and resume your usual dosing schedule. Do not double the dose to catch up because this could increase your risk for bleeding. Keep a record of missed doses to give to your doctor or pharmacist. Contact your doctor or pharmacist if you miss 2 or more doses in a row.      STORAGE:  Store at room temperature away from light and moisture. Do not store in the bathroom. Keep all medications away from children and pets. Do not flush medications down the toilet or pour them into a drain unless  instructed to do so. Properly discard this product when it is  or no longer needed. Consult your pharmacist or local waste disposal company for more details about how to safely discard your product.      MEDICAL ALERT:  Your condition and medication can cause complications in a medical emergency. For information about enrolling in MedicAlert, call 1-382.350.7730 ([place]) or 1-797.625.5681 ([place]).      Information last revised 2010 Copyright(c)  [employer]             Depression / Suicide Risk    As you are discharged from this [facility] facility, it is important to learn how to keep safe from harming yourself.    Recognize the warning signs:  · Abrupt changes in personality, positive or negative- including increase in energy   · Giving away possessions  · Change in eating patterns- significant weight changes-  positive or negative  · Change in sleeping patterns- unable to sleep or sleeping all the time   · Unwillingness or inability to communicate  · Depression  · Unusual sadness, discouragement and loneliness  · Talk of wanting to die  · Neglect of personal appearance   · Rebelliousness- reckless behavior  · Withdrawal from people/activities they love  · Confusion- inability to concentrate     If you or a loved one observes any of these behaviors or has concerns about self-harm, here's what you can do:  · Talk about it- your feelings and reasons for harming yourself  · Remove any means that you might use to hurt yourself (examples: pills, rope, extension cords, firearm)  · Get professional help from the community (Mental Health, Substance Abuse, psychological counseling)  · Do not be alone:Call your Safe Contact- someone whom you trust who will be there for you.  · Call your local CRISIS HOTLINE 004-3357 or 732-760-6544  · Call your local Children's Mobile Crisis Response Team [place] (233) 967-6482 or www.[facility]  · Call the toll free National Suicide Prevention  Hotlines   · National Suicide Prevention Lifeline 774-731-WOYQ (9244)  · [facility] 800-SUICIDE (085-6124)      Cellulitis, Adult    Cellulitis is a skin infection. The infected area is often warm, red, swollen, and sore. It occurs most often in the arms and lower legs. It is very important to get treated for this condition.  What are the causes?  This condition is caused by bacteria. The bacteria enter through a break in the skin, such as a cut, burn, insect bite, open sore, or crack.  What increases the risk?  This condition is more likely to occur in people who:  · Have a weak body defense system (immune system).  · Have open cuts, burns, bites, or scrapes on the skin.  · Are older than 60 years of age.  · Have a blood sugar problem (diabetes).  · Have a long-lasting (chronic) liver disease (cirrhosis) or kidney disease.  · Are very overweight (obese).  · Have a skin problem, such as:  ? Itchy rash (eczema).  ? Slow movement of blood in the veins (venous stasis).  ? Fluid buildup below the skin (edema).  · Have been treated with high-energy rays (radiation).  · Use IV drugs.  What are the signs or symptoms?  Symptoms of this condition include:  · Skin that is:  ? Red.  ? Streaking.  ? Spotting.  ? Swollen.  ? Sore or painful when you touch it.  ? Warm.  · A fever.  · Chills.  · Blisters.  How is this diagnosed?  This condition is diagnosed based on:  · Medical history.  · Physical exam.  · Blood tests.  · Imaging tests.  How is this treated?  Treatment for this condition may include:  · Medicines to treat infections or allergies.  · Home care, such as:  ? Rest.  ? Placing cold or warm cloths (compresses) on the skin.  · Hospital care, if the condition is very bad.  Follow these instructions at home:  Medicines  · Take over-the-counter and prescription medicines only as told by your doctor.  · If you were prescribed an antibiotic medicine, take it as told by your doctor. Do not stop taking it even  if you start to feel better.  General instructions    · Drink enough fluid to keep your pee (urine) pale yellow.  · Do not touch or rub the infected area.  · Raise (elevate) the infected area above the level of your heart while you are sitting or lying down.  · Place cold or warm cloths on the area as told by your doctor.  · Keep all follow-up visits as told by your doctor. This is important.  Contact a doctor if:  · You have a fever.  · You do not start to get better after 1-2 days of treatment.  · Your bone or joint under the infected area starts to hurt after the skin has healed.  · Your infection comes back. This can happen in the same area or another area.  · You have a swollen bump in the area.  · You have new symptoms.  · You feel ill and have muscle aches and pains.  Get help right away if:  · Your symptoms get worse.  · You feel very sleepy.  · You throw up (vomit) or have watery poop (diarrhea) for a long time.  · You see red streaks coming from the area.  · Your red area gets larger.  · Your red area turns dark in color.  These symptoms may represent a serious problem that is an emergency. Do not wait to see if the symptoms will go away. Get medical help right away. Call your local emergency services (911 in the [place] Do not drive yourself to the hospital.  Summary  · Cellulitis is a skin infection. The area is often warm, red, swollen, and sore.  · This condition is treated with medicines, rest, and cold and warm cloths.  · Take all medicines only as told by your doctor.  · Tell your doctor if symptoms do not start to get better after 1-2 days of treatment.  This information is not intended to replace advice given to you by your health care provider. Make sure you discuss any questions you have with your health care provider.  Document Released: 06/05/2009 Document Revised: 05/09/2019 Document Reviewed: 05/09/2019  [employer] Patient Education © 2020 [employer]

## 2020-12-24 NOTE — DISCHARGE PLANNING
Anticipated Discharge Disposition:   Home  Taxi voucher    Action:    Pt requesting assistance with transportation.  No MTM benefit.  Taxi voucher provided.    Barriers to Discharge:    None    Plan:    DC

## 2020-12-24 NOTE — DISCHARGE SUMMARY
Discharge Summary    CHIEF COMPLAINT ON ADMISSION  Chief Complaint   Patient presents with   • Leg Swelling     Left leg swelling X1 week, redness and old scabs   • Chest Pain     intermittent, non radiating       Reason for Admission  Left leg pain     Admission Date  12/15/2020    CODE STATUS  Full Code    HPI & HOSPITAL COURSE  This is a 65 y.o. 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  He states he has been itching and scratching his lower legs.  He developed secondary cellulitis to his left lower leg.  Review of echocardiogram demonstrates dilated left atrium severe.  Functioning bioprosthetic mitral valve prosthesis EF 55%. Patient admitted and started on antibiotics for left lower extremity cellulitis. Patient also noted to have shortness of breath with LE edema and pulmonary edema noted on CXR, started on lasix. LE venous US showed no DVT. Cellulitis worsened and patient changed to IV abx with MRSA coverage. Leg pain also worsened by gout in his knee and ankle started on colchicine. Patient had intermittent episodes of bright red blood per rectum from hemorrhoids, hemoglobin stable.  Patient's lower extremity cellulitis has greatly improved still has some minimal erythema, but pain is much improved.  Also swelling has resolved, will continue short course of oral Lasix, patient will need to follow-up with primary care for continued monitoring of fluid status. Patient will be discharged with Bactrim as he previously failed Keflex and doxycycline.  Patient's vital signs are stable and he is ready for discharge home.  Patient is to return to the ER if his condition worsens.    Therefore, he is discharged in good and stable condition to home with close outpatient follow-up.    The patient met 2-midnight  criteria for an inpatient stay at the time of discharge.    Discharge Date  12/24/2020    FOLLOW UP ITEMS POST DISCHARGE  Follow-up with primary care physician.    Follow-up with the anticoagulation clinic for an INR check.    DISCHARGE DIAGNOSES  Principal Problem:    Cellulitis of left lower extremity POA: Yes  Active Problems:    Paroxysmal atrial fibrillation (HCC) POA: Yes    Pacemaker POA: Unknown    Valvular cardiomyopathy (HCC) POA: Unknown    H/O mitral valve and aortic valve replacement POA: Yes    Essential hypertension, benign POA: Yes    Type 2 diabetes mellitus with complication, without long-term current use of insulin (HCC) POA: Yes    COPD (chronic obstructive pulmonary disease) (HCC) POA: Yes  Resolved Problems:    Atypical chest pain POA: Yes    Severe sepsis (HCC) POA: Unknown    Bleeding hemorrhoid POA: Unknown      FOLLOW UP  No future appointments.  [facility]  [address]  [facility] [address]  [phone]        Yelena Haney P.A.-C.  [address] [place] [id number]  [phone]      Follow up with primary care in 1-2 weeks    Anticoagulation clinic      For INR check, see above appointment       MEDICATIONS ON DISCHARGE     Medication List      START taking these medications      Instructions   benzonatate 100 MG Caps  Commonly known as: TESSALON   Take 1 Cap by mouth 3 times a day as needed for Cough.  Dose: 100 mg     colchicine 0.6 MG Tabs  Commonly known as: COLCRYS   Take 1 Tab by mouth 2 Times a Day.  Dose: 0.6 mg     furosemide 20 MG Tabs  Start taking on: December 25, 2020  Commonly known as: LASIX   Take 1 Tab by mouth every day.  Dose: 20 mg     hydrOXYzine HCl 25 MG Tabs  Commonly known as: ATARAX   Take 1 Tab by mouth 3 times a day as needed for Itching.  Dose: 25 mg     sulfamethoxazole-trimethoprim 800-160 MG tablet  Commonly known as: BACTRIM DS   Take 1 Tab by mouth 2 times a day for 5 days.  Dose: 1 Tab        CHANGE how you take  these medications      Instructions   * warfarin 2 MG Tabs  What changed:   · when to take this  · additional instructions  Commonly known as: COUMADIN   Doctor's comments: This rx was submitted by a pharmacist working under a collaborative practice agreement.  Take 1 Tab by mouth see administration instructions. Takes 2 mg every Friday has 5 mg tablets to take all other days  Dose: 2 mg     * warfarin 5 MG Tabs  What changed:   · how much to take  · how to take this  · when to take this  · additional instructions  Commonly known as: COUMADIN   Take 1 Tab by mouth see administration instructions. Takes Monday, Tuesday, Wednesday, Thursday, Saturday, and Sunday, has 2 mg tablets to take on Fridays  Dose: 5 mg         * This list has 2 medication(s) that are the same as other medications prescribed for you. Read the directions carefully, and ask your doctor or other care provider to review them with you.            CONTINUE taking these medications      Instructions   acetaminophen 500 MG Tabs  Commonly known as: TYLENOL   Take 1,000 mg by mouth every 6 hours as needed for Mild Pain.  Dose: 1,000 mg     allopurinol 100 MG Tabs  Commonly known as: ZYLOPRIM   Take 100 mg by mouth every morning.  Dose: 100 mg     cetirizine 10 MG Tabs  Commonly known as: ZYRTEC   Take 10 mg by mouth every morning.  Dose: 10 mg     lisinopril 2.5 MG Tabs  Commonly known as: PRINIVIL   Take 2.5 mg by mouth every evening.  Dose: 2.5 mg     metFORMIN 500 MG Tabs  Commonly known as: GLUCOPHAGE   Take 500 mg by mouth 2 times a day, with meals.  Dose: 500 mg     metoprolol SR 50 MG Tb24  Commonly known as: TOPROL XL   Take 50 mg by mouth every morning.  Dose: 50 mg     tamsulosin 0.4 MG capsule  Commonly known as: FLOMAX   Doctor's comments: PT TO SEE PCP FOR FURTHER REFILLS  TAKE 1 CAPSULE BY MOUTH EVERY DAY            Allergies  No Known Allergies    DIET  Orders Placed This Encounter   Procedures   • Diet Order Diet: Cardiac; Fluid  modifications: (optional): 1500 ml Fluid Restriction     Standing Status:   Standing     Number of Occurrences:   1     Order Specific Question:   Diet:     Answer:   Cardiac [6]     Order Specific Question:   Fluid modifications: (optional)     Answer:   1500 ml Fluid Restriction [9]       ACTIVITY  As tolerated.  Weight bearing as tolerated    CONSULTATIONS  N/A    PROCEDURES  N/A    LABORATORY  Lab Results   Component Value Date    SODIUM 133 (L) 12/24/2020    POTASSIUM 4.1 12/24/2020    CHLORIDE 101 12/24/2020    CO2 25 12/24/2020    GLUCOSE 104 (H) 12/24/2020    BUN 10 12/24/2020    CREATININE 1.11 12/24/2020    CREATININE 1.4 04/27/2009        Lab Results   Component Value Date    WBC 5.3 12/24/2020    HEMOGLOBIN 11.3 (L) 12/24/2020    HEMATOCRIT 35.1 (L) 12/24/2020    PLATELETCT 175 12/24/2020        Total time of the discharge process exceeds 35 minutes.

## 2020-12-28 NOTE — PROGRESS NOTES
Community Health Worker Intake  • Social determinates of health intake Complete.   • Identified barriers to transportation.  • Contact information provided to Morales Olivier   • Has PCP appointment scheduled   • Outpatient assessment completed.  • Did the patient receive medications post discharge: Yes    CHW Teodoro called pt to introduce CCM program. Pt states need for transportation, CHW introduced MTM and access to healthcare transport. Pt requested information to be mailed. Pt identifies no other barriers to resources. Pt expressed no other needs at this time.    Plan: CHW will mail transport resources. CHW will d/c pt from CCM team

## 2020-12-31 NOTE — PROGRESS NOTES
Anticoagulation Summary  As of 2020    INR goal:  2.5-3.5   TTR:  57.4 % (1.3 y)   INR used for dosin.70 (2020)   Warfarin maintenance plan:  2 mg (2 mg x 1) every Fri; 5 mg (5 mg x 1) all other days   Weekly warfarin total:  32 mg   Plan last modified:  SHARMILA Bob (2020)   Next INR check:  2021   Priority:  Acute   Target end date:  Indefinite    Indications    H/O mitral valve and aortic valve replacement [Z95.2]  Paroxysmal atrial fibrillation (HCC) [I48.0]             Anticoagulation Episode Summary     INR check location:      Preferred lab:      Send INR reminders to:      Comments:  PARKER TREVINO      Anticoagulation Care Providers     Provider Role Specialty Phone number    Sumanth Yancey M.D. Referring Cardiology [phone]    [facility] Responsible  [phone]        Anticoagulation Patient Findings      HPI:  Morales Olivier seen in clinic today, on anticoagulation therapy with warfarin for atrial fibrillation and mitral valve replacement  Pt NOT on antiplatelet therapy   Changes to current medical/health status since last appt: Pt was recently hospitalized for left leg pain.  Denies signs/symptoms of bleeding and/or thrombosis since the last appt.    Denies any interval changes to diet  Pt was started on Bactrim while hospitalized and is expected to be completed 2021. Bactrim ordered by hospitalist without close follow up.  Will file MIDAS   Denies any complications or cost restrictions with current therapy.   BP recorded in vitals: 88/41; HR = 128. Repeat: 95/49 HR = 58. Currently asymptomatic and denies any dizziness/lightheadedness today. To monitor until next visit.    A/P   INR  SUPRA-therapeutic. Pt declined venipuncture for confirmation at this time.   Instructed pt to HOLD DOSE 4x then repeat INR following the weekend.   Pt to contact emergency services and clinic at any sign of significant bleeding until next appointment.      Follow  up appointment in 4 days    Hans Bradford, Meredith      I have reviewed and concur with the above plan on 12/31/2020.  Brandi Howard, Clinical Pharmacist, CDE, CACP  .

## 2021-01-01 ENCOUNTER — APPOINTMENT (OUTPATIENT)
Dept: RADIOLOGY | Facility: MEDICAL CENTER | Age: 66
DRG: 871 | End: 2021-01-01
Attending: INTERNAL MEDICINE
Payer: MEDICARE

## 2021-01-01 ENCOUNTER — APPOINTMENT (OUTPATIENT)
Dept: RADIOLOGY | Facility: MEDICAL CENTER | Age: 66
DRG: 640 | End: 2021-01-01
Attending: STUDENT IN AN ORGANIZED HEALTH CARE EDUCATION/TRAINING PROGRAM
Payer: MEDICARE

## 2021-01-01 ENCOUNTER — HOSPITAL ENCOUNTER (INPATIENT)
Dept: RADIOLOGY | Facility: MEDICAL CENTER | Age: 66
DRG: 026 | End: 2021-03-22
Attending: STUDENT IN AN ORGANIZED HEALTH CARE EDUCATION/TRAINING PROGRAM
Payer: MEDICARE

## 2021-01-01 ENCOUNTER — APPOINTMENT (OUTPATIENT)
Dept: RADIOLOGY | Facility: MEDICAL CENTER | Age: 66
DRG: 026 | End: 2021-01-01
Attending: EMERGENCY MEDICINE
Payer: MEDICARE

## 2021-01-01 ENCOUNTER — APPOINTMENT (OUTPATIENT)
Dept: RADIOLOGY | Facility: MEDICAL CENTER | Age: 66
DRG: 026 | End: 2021-01-01
Attending: STUDENT IN AN ORGANIZED HEALTH CARE EDUCATION/TRAINING PROGRAM
Payer: MEDICARE

## 2021-01-01 ENCOUNTER — HOSPITAL ENCOUNTER (INPATIENT)
Facility: MEDICAL CENTER | Age: 66
LOS: 12 days | DRG: 871 | End: 2021-02-16
Attending: EMERGENCY MEDICINE | Admitting: STUDENT IN AN ORGANIZED HEALTH CARE EDUCATION/TRAINING PROGRAM
Payer: MEDICARE

## 2021-01-01 ENCOUNTER — TELEPHONE (OUTPATIENT)
Dept: VASCULAR LAB | Facility: MEDICAL CENTER | Age: 66
End: 2021-01-01

## 2021-01-01 ENCOUNTER — OFFICE VISIT (OUTPATIENT)
Dept: NEUROLOGY | Facility: MEDICAL CENTER | Age: 66
End: 2021-01-01
Attending: PSYCHIATRY & NEUROLOGY
Payer: MEDICARE

## 2021-01-01 ENCOUNTER — APPOINTMENT (OUTPATIENT)
Dept: RADIOLOGY | Facility: MEDICAL CENTER | Age: 66
DRG: 871 | End: 2021-01-01
Attending: STUDENT IN AN ORGANIZED HEALTH CARE EDUCATION/TRAINING PROGRAM
Payer: MEDICARE

## 2021-01-01 ENCOUNTER — APPOINTMENT (OUTPATIENT)
Dept: CARDIOLOGY | Facility: MEDICAL CENTER | Age: 66
DRG: 871 | End: 2021-01-01
Attending: STUDENT IN AN ORGANIZED HEALTH CARE EDUCATION/TRAINING PROGRAM
Payer: MEDICARE

## 2021-01-01 ENCOUNTER — HOSPITAL ENCOUNTER (INPATIENT)
Facility: MEDICAL CENTER | Age: 66
LOS: 4 days | DRG: 640 | End: 2021-04-21
Attending: EMERGENCY MEDICINE | Admitting: STUDENT IN AN ORGANIZED HEALTH CARE EDUCATION/TRAINING PROGRAM
Payer: MEDICARE

## 2021-01-01 ENCOUNTER — APPOINTMENT (OUTPATIENT)
Dept: RADIOLOGY | Facility: MEDICAL CENTER | Age: 66
DRG: 640 | End: 2021-01-01
Attending: EMERGENCY MEDICINE
Payer: MEDICARE

## 2021-01-01 ENCOUNTER — APPOINTMENT (OUTPATIENT)
Dept: RADIOLOGY | Facility: MEDICAL CENTER | Age: 66
DRG: 026 | End: 2021-01-01
Attending: NEUROLOGICAL SURGERY
Payer: MEDICARE

## 2021-01-01 ENCOUNTER — APPOINTMENT (OUTPATIENT)
Dept: RADIOLOGY | Facility: MEDICAL CENTER | Age: 66
DRG: 026 | End: 2021-01-01
Attending: NURSE PRACTITIONER
Payer: MEDICARE

## 2021-01-01 ENCOUNTER — APPOINTMENT (OUTPATIENT)
Dept: CARDIOLOGY | Facility: MEDICAL CENTER | Age: 66
DRG: 871 | End: 2021-01-01
Attending: INTERNAL MEDICINE
Payer: MEDICARE

## 2021-01-01 ENCOUNTER — APPOINTMENT (OUTPATIENT)
Dept: RADIOLOGY | Facility: MEDICAL CENTER | Age: 66
DRG: 871 | End: 2021-01-01
Attending: EMERGENCY MEDICINE
Payer: MEDICARE

## 2021-01-01 ENCOUNTER — ANTICOAGULATION MONITORING (OUTPATIENT)
Dept: VASCULAR LAB | Facility: MEDICAL CENTER | Age: 66
End: 2021-01-01

## 2021-01-01 ENCOUNTER — HOSPITAL ENCOUNTER (OUTPATIENT)
Dept: LAB | Facility: MEDICAL CENTER | Age: 66
End: 2021-06-28
Attending: PHYSICIAN ASSISTANT
Payer: MEDICARE

## 2021-01-01 ENCOUNTER — HOSPITAL ENCOUNTER (INPATIENT)
Facility: MEDICAL CENTER | Age: 66
LOS: 2 days | DRG: 871 | End: 2021-09-22
Attending: EMERGENCY MEDICINE | Admitting: STUDENT IN AN ORGANIZED HEALTH CARE EDUCATION/TRAINING PROGRAM
Payer: MEDICARE

## 2021-01-01 ENCOUNTER — TELEPHONE (OUTPATIENT)
Dept: CARDIOLOGY | Facility: MEDICAL CENTER | Age: 66
End: 2021-01-01

## 2021-01-01 ENCOUNTER — HOSPITAL ENCOUNTER (INPATIENT)
Facility: MEDICAL CENTER | Age: 66
LOS: 32 days | DRG: 026 | End: 2021-04-13
Attending: EMERGENCY MEDICINE | Admitting: INTERNAL MEDICINE
Payer: MEDICARE

## 2021-01-01 ENCOUNTER — ANESTHESIA (OUTPATIENT)
Dept: SURGERY | Facility: MEDICAL CENTER | Age: 66
DRG: 026 | End: 2021-01-01
Payer: MEDICARE

## 2021-01-01 ENCOUNTER — ANESTHESIA EVENT (OUTPATIENT)
Dept: SURGERY | Facility: MEDICAL CENTER | Age: 66
DRG: 026 | End: 2021-01-01
Payer: MEDICARE

## 2021-01-01 ENCOUNTER — HOSPITAL ENCOUNTER (INPATIENT)
Facility: REHABILITATION | Age: 66
End: 2021-01-01
Attending: PHYSICAL MEDICINE & REHABILITATION | Admitting: PHYSICAL MEDICINE & REHABILITATION
Payer: MEDICARE

## 2021-01-01 ENCOUNTER — APPOINTMENT (OUTPATIENT)
Dept: RADIOLOGY | Facility: MEDICAL CENTER | Age: 66
DRG: 026 | End: 2021-01-01
Attending: INTERNAL MEDICINE
Payer: MEDICARE

## 2021-01-01 ENCOUNTER — PATIENT OUTREACH (OUTPATIENT)
Dept: HEALTH INFORMATION MANAGEMENT | Facility: OTHER | Age: 66
End: 2021-01-01

## 2021-01-01 ENCOUNTER — HOSPICE ADMISSION (OUTPATIENT)
Dept: HOSPICE | Facility: HOSPICE | Age: 66
End: 2021-01-01
Payer: MEDICARE

## 2021-01-01 ENCOUNTER — OFFICE VISIT (OUTPATIENT)
Dept: CARDIOLOGY | Facility: MEDICAL CENTER | Age: 66
End: 2021-01-01
Payer: MEDICARE

## 2021-01-01 ENCOUNTER — ANTICOAGULATION MONITORING (OUTPATIENT)
Dept: CARDIOLOGY | Facility: MEDICAL CENTER | Age: 66
End: 2021-01-01

## 2021-01-01 ENCOUNTER — PHARMACY VISIT (OUTPATIENT)
Dept: PHARMACY | Facility: MEDICAL CENTER | Age: 66
End: 2021-01-01
Payer: COMMERCIAL

## 2021-01-01 ENCOUNTER — ANTICOAGULATION VISIT (OUTPATIENT)
Dept: VASCULAR LAB | Facility: MEDICAL CENTER | Age: 66
End: 2021-01-01
Attending: INTERNAL MEDICINE
Payer: MEDICARE

## 2021-01-01 VITALS
SYSTOLIC BLOOD PRESSURE: 153 MMHG | RESPIRATION RATE: 20 BRPM | WEIGHT: 246.47 LBS | HEART RATE: 82 BPM | HEIGHT: 70 IN | DIASTOLIC BLOOD PRESSURE: 89 MMHG | TEMPERATURE: 97.7 F | BODY MASS INDEX: 35.29 KG/M2 | OXYGEN SATURATION: 94 %

## 2021-01-01 VITALS
SYSTOLIC BLOOD PRESSURE: 101 MMHG | HEIGHT: 70 IN | BODY MASS INDEX: 31.15 KG/M2 | DIASTOLIC BLOOD PRESSURE: 75 MMHG | TEMPERATURE: 98.3 F | HEART RATE: 85 BPM | OXYGEN SATURATION: 97 % | WEIGHT: 217.59 LBS | RESPIRATION RATE: 18 BRPM

## 2021-01-01 VITALS
DIASTOLIC BLOOD PRESSURE: 55 MMHG | HEART RATE: 180 BPM | RESPIRATION RATE: 47 BRPM | TEMPERATURE: 97.6 F | OXYGEN SATURATION: 77 % | BODY MASS INDEX: 31.03 KG/M2 | HEIGHT: 70 IN | SYSTOLIC BLOOD PRESSURE: 93 MMHG | WEIGHT: 216.71 LBS

## 2021-01-01 VITALS
BODY MASS INDEX: 32.54 KG/M2 | HEART RATE: 80 BPM | TEMPERATURE: 98.4 F | WEIGHT: 227.29 LBS | RESPIRATION RATE: 18 BRPM | SYSTOLIC BLOOD PRESSURE: 110 MMHG | HEIGHT: 70 IN | DIASTOLIC BLOOD PRESSURE: 77 MMHG | OXYGEN SATURATION: 92 %

## 2021-01-01 VITALS
WEIGHT: 239.64 LBS | OXYGEN SATURATION: 98 % | DIASTOLIC BLOOD PRESSURE: 82 MMHG | HEART RATE: 67 BPM | TEMPERATURE: 99 F | BODY MASS INDEX: 34.31 KG/M2 | SYSTOLIC BLOOD PRESSURE: 122 MMHG | HEIGHT: 70 IN | RESPIRATION RATE: 20 BRPM

## 2021-01-01 VITALS
OXYGEN SATURATION: 94 % | RESPIRATION RATE: 16 BRPM | DIASTOLIC BLOOD PRESSURE: 70 MMHG | HEART RATE: 88 BPM | SYSTOLIC BLOOD PRESSURE: 112 MMHG | HEIGHT: 70 IN | BODY MASS INDEX: 34.76 KG/M2 | WEIGHT: 242.8 LBS

## 2021-01-01 VITALS
HEART RATE: 115 BPM | SYSTOLIC BLOOD PRESSURE: 111 MMHG | DIASTOLIC BLOOD PRESSURE: 74 MMHG | BODY MASS INDEX: 35.87 KG/M2 | WEIGHT: 250 LBS

## 2021-01-01 DIAGNOSIS — I48.0 PAROXYSMAL ATRIAL FIBRILLATION (HCC): ICD-10-CM

## 2021-01-01 DIAGNOSIS — S06.5XAA SUBDURAL HEMATOMA, ACUTE (HCC): ICD-10-CM

## 2021-01-01 DIAGNOSIS — U07.1 PNEUMONIA DUE TO COVID-19 VIRUS: ICD-10-CM

## 2021-01-01 DIAGNOSIS — J96.01 SEPSIS WITH ACUTE HYPOXIC RESPIRATORY FAILURE AND SEPTIC SHOCK, DUE TO UNSPECIFIED ORGANISM (HCC): ICD-10-CM

## 2021-01-01 DIAGNOSIS — E78.2 MIXED HYPERLIPIDEMIA: ICD-10-CM

## 2021-01-01 DIAGNOSIS — E11.9 TYPE 2 DIABETES MELLITUS WITHOUT COMPLICATION, WITHOUT LONG-TERM CURRENT USE OF INSULIN (HCC): ICD-10-CM

## 2021-01-01 DIAGNOSIS — I95.9 HYPOTENSION, UNSPECIFIED HYPOTENSION TYPE: Primary | ICD-10-CM

## 2021-01-01 DIAGNOSIS — I15.1 HYPERTENSION SECONDARY TO OTHER RENAL DISORDERS: ICD-10-CM

## 2021-01-01 DIAGNOSIS — I42.8 VALVULAR CARDIOMYOPATHY (HCC): ICD-10-CM

## 2021-01-01 DIAGNOSIS — Z95.2 H/O MITRAL VALVE REPLACEMENT: ICD-10-CM

## 2021-01-01 DIAGNOSIS — A41.9 SEPSIS WITH ACUTE HYPOXIC RESPIRATORY FAILURE AND SEPTIC SHOCK, DUE TO UNSPECIFIED ORGANISM (HCC): ICD-10-CM

## 2021-01-01 DIAGNOSIS — R53.1 RIGHT SIDED WEAKNESS: ICD-10-CM

## 2021-01-01 DIAGNOSIS — J12.82 PNEUMONIA DUE TO COVID-19 VIRUS: ICD-10-CM

## 2021-01-01 DIAGNOSIS — J44.1 COPD EXACERBATION (HCC): ICD-10-CM

## 2021-01-01 DIAGNOSIS — L03.115 CELLULITIS OF RIGHT LOWER EXTREMITY: ICD-10-CM

## 2021-01-01 DIAGNOSIS — E11.8 TYPE 2 DIABETES MELLITUS WITH COMPLICATION, WITHOUT LONG-TERM CURRENT USE OF INSULIN (HCC): ICD-10-CM

## 2021-01-01 DIAGNOSIS — E86.0 DEHYDRATION: ICD-10-CM

## 2021-01-01 DIAGNOSIS — R56.9 SEIZURE (HCC): ICD-10-CM

## 2021-01-01 DIAGNOSIS — Z95.2 HISTORY OF PROSTHETIC MITRAL VALVE: ICD-10-CM

## 2021-01-01 DIAGNOSIS — R74.8 ELEVATED CK: ICD-10-CM

## 2021-01-01 DIAGNOSIS — I48.91 ATRIAL FIBRILLATION, UNSPECIFIED TYPE (HCC): ICD-10-CM

## 2021-01-01 DIAGNOSIS — M1A.9XX0 CHRONIC GOUT WITHOUT TOPHUS, UNSPECIFIED CAUSE, UNSPECIFIED SITE: ICD-10-CM

## 2021-01-01 DIAGNOSIS — I87.2 VENOUS STASIS DERMATITIS OF RIGHT LOWER EXTREMITY: ICD-10-CM

## 2021-01-01 DIAGNOSIS — R41.82 ALTERED MENTAL STATUS, UNSPECIFIED ALTERED MENTAL STATUS TYPE: ICD-10-CM

## 2021-01-01 DIAGNOSIS — Z95.0 PACEMAKER: ICD-10-CM

## 2021-01-01 DIAGNOSIS — J96.01 ACUTE RESPIRATORY FAILURE WITH HYPOXIA (HCC): ICD-10-CM

## 2021-01-01 DIAGNOSIS — R53.1 WEAKNESS: ICD-10-CM

## 2021-01-01 DIAGNOSIS — E87.0 HYPERNATREMIA: ICD-10-CM

## 2021-01-01 DIAGNOSIS — K70.30 ALCOHOLIC CIRRHOSIS OF LIVER WITHOUT ASCITES (HCC): ICD-10-CM

## 2021-01-01 DIAGNOSIS — R62.7 FTT (FAILURE TO THRIVE) IN ADULT: ICD-10-CM

## 2021-01-01 DIAGNOSIS — L03.116 CELLULITIS OF LEFT LOWER EXTREMITY: ICD-10-CM

## 2021-01-01 DIAGNOSIS — I47.10 PAROXYSMAL SVT (SUPRAVENTRICULAR TACHYCARDIA) (HCC): ICD-10-CM

## 2021-01-01 DIAGNOSIS — E78.1 HYPERTRIGLYCERIDEMIA: ICD-10-CM

## 2021-01-01 DIAGNOSIS — D68.318 ACQUIRED CIRCULATING ANTICOAGULANTS (HCC): ICD-10-CM

## 2021-01-01 DIAGNOSIS — Z79.01 CHRONIC ANTICOAGULATION: ICD-10-CM

## 2021-01-01 DIAGNOSIS — M10.9 ACUTE GOUT, UNSPECIFIED CAUSE, UNSPECIFIED SITE: ICD-10-CM

## 2021-01-01 DIAGNOSIS — R74.01 TRANSAMINITIS: ICD-10-CM

## 2021-01-01 DIAGNOSIS — G47.33 OBSTRUCTIVE SLEEP APNEA: ICD-10-CM

## 2021-01-01 DIAGNOSIS — J96.00 ACUTE RESPIRATORY FAILURE, UNSPECIFIED WHETHER WITH HYPOXIA OR HYPERCAPNIA (HCC): ICD-10-CM

## 2021-01-01 DIAGNOSIS — R79.89 LOW SERUM CORTISOL LEVEL: ICD-10-CM

## 2021-01-01 DIAGNOSIS — R60.0 EDEMA OF LOWER EXTREMITY: ICD-10-CM

## 2021-01-01 DIAGNOSIS — S06.5XAA SUBDURAL HEMATOMA (HCC): ICD-10-CM

## 2021-01-01 DIAGNOSIS — R47.01 EXPRESSIVE APHASIA: ICD-10-CM

## 2021-01-01 DIAGNOSIS — J44.9 CHRONIC OBSTRUCTIVE PULMONARY DISEASE, UNSPECIFIED COPD TYPE (HCC): ICD-10-CM

## 2021-01-01 DIAGNOSIS — I48.91 ATRIAL FIBRILLATION WITH RVR (HCC): ICD-10-CM

## 2021-01-01 DIAGNOSIS — I10 ESSENTIAL HYPERTENSION, BENIGN: ICD-10-CM

## 2021-01-01 DIAGNOSIS — H81.13 BENIGN PAROXYSMAL POSITIONAL VERTIGO DUE TO BILATERAL VESTIBULAR DISORDER: ICD-10-CM

## 2021-01-01 DIAGNOSIS — D61.818 PANCYTOPENIA (HCC): ICD-10-CM

## 2021-01-01 DIAGNOSIS — R62.7 FAILURE TO THRIVE IN ADULT: ICD-10-CM

## 2021-01-01 DIAGNOSIS — Z95.2 PRESENCE OF PROSTHETIC HEART VALVE: ICD-10-CM

## 2021-01-01 DIAGNOSIS — R94.31 ELECTROCARDIOGRAM ABNORMAL: ICD-10-CM

## 2021-01-01 DIAGNOSIS — E87.20 LACTIC ACIDOSIS: ICD-10-CM

## 2021-01-01 DIAGNOSIS — E66.9 OBESITY (BMI 30-39.9): ICD-10-CM

## 2021-01-01 DIAGNOSIS — I49.3 PVC (PREMATURE VENTRICULAR CONTRACTION): ICD-10-CM

## 2021-01-01 DIAGNOSIS — Z92.29 HISTORY OF DRUG THERAPY: ICD-10-CM

## 2021-01-01 DIAGNOSIS — N17.9 AKI (ACUTE KIDNEY INJURY) (HCC): ICD-10-CM

## 2021-01-01 DIAGNOSIS — R65.21 SEPSIS WITH ACUTE HYPOXIC RESPIRATORY FAILURE AND SEPTIC SHOCK, DUE TO UNSPECIFIED ORGANISM (HCC): ICD-10-CM

## 2021-01-01 LAB
ABO GROUP BLD: NORMAL
ACTH PLAS-MCNC: 3.6 PG/ML (ref 7.2–63.3)
ACTION RANGE TRIGGERED IACRT: NO
ALBUMIN SERPL BCP-MCNC: 2.1 G/DL (ref 3.2–4.9)
ALBUMIN SERPL BCP-MCNC: 2.4 G/DL (ref 3.2–4.9)
ALBUMIN SERPL BCP-MCNC: 2.6 G/DL (ref 3.2–4.9)
ALBUMIN SERPL BCP-MCNC: 2.7 G/DL (ref 3.2–4.9)
ALBUMIN SERPL BCP-MCNC: 2.8 G/DL (ref 3.2–4.9)
ALBUMIN SERPL BCP-MCNC: 2.9 G/DL (ref 3.2–4.9)
ALBUMIN SERPL BCP-MCNC: 2.9 G/DL (ref 3.2–4.9)
ALBUMIN SERPL BCP-MCNC: 3 G/DL (ref 3.2–4.9)
ALBUMIN SERPL BCP-MCNC: 3.1 G/DL (ref 3.2–4.9)
ALBUMIN SERPL BCP-MCNC: 3.1 G/DL (ref 3.2–4.9)
ALBUMIN SERPL BCP-MCNC: 3.2 G/DL (ref 3.2–4.9)
ALBUMIN SERPL BCP-MCNC: 3.3 G/DL (ref 3.2–4.9)
ALBUMIN SERPL BCP-MCNC: 3.4 G/DL (ref 3.2–4.9)
ALBUMIN SERPL BCP-MCNC: 3.4 G/DL (ref 3.2–4.9)
ALBUMIN SERPL BCP-MCNC: 3.5 G/DL (ref 3.2–4.9)
ALBUMIN SERPL BCP-MCNC: 3.5 G/DL (ref 3.2–4.9)
ALBUMIN SERPL BCP-MCNC: 4.1 G/DL (ref 3.2–4.9)
ALBUMIN SERPL ELPH-MCNC: 2.93 G/DL (ref 3.75–5.01)
ALBUMIN/GLOB SERPL: 0.7 G/DL
ALBUMIN/GLOB SERPL: 0.8 G/DL
ALBUMIN/GLOB SERPL: 0.9 G/DL
ALBUMIN/GLOB SERPL: 1 G/DL
ALBUMIN/GLOB SERPL: 1 G/DL
ALBUMIN/GLOB SERPL: 1.1 G/DL
ALBUMIN/GLOB SERPL: 1.1 G/DL
ALBUMIN/GLOB SERPL: 1.2 G/DL
ALBUMIN/GLOB SERPL: 1.3 G/DL
ALBUMIN/GLOB SERPL: 1.4 G/DL
ALBUMIN/GLOB SERPL: 1.6 G/DL
ALP SERPL-CCNC: 100 U/L (ref 30–99)
ALP SERPL-CCNC: 104 U/L (ref 30–99)
ALP SERPL-CCNC: 112 U/L (ref 30–99)
ALP SERPL-CCNC: 51 U/L (ref 30–99)
ALP SERPL-CCNC: 52 U/L (ref 30–99)
ALP SERPL-CCNC: 61 U/L (ref 30–99)
ALP SERPL-CCNC: 64 U/L (ref 30–99)
ALP SERPL-CCNC: 64 U/L (ref 30–99)
ALP SERPL-CCNC: 66 U/L (ref 30–99)
ALP SERPL-CCNC: 66 U/L (ref 30–99)
ALP SERPL-CCNC: 67 U/L (ref 30–99)
ALP SERPL-CCNC: 69 U/L (ref 30–99)
ALP SERPL-CCNC: 70 U/L (ref 30–99)
ALP SERPL-CCNC: 70 U/L (ref 30–99)
ALP SERPL-CCNC: 72 U/L (ref 30–99)
ALP SERPL-CCNC: 73 U/L (ref 30–99)
ALP SERPL-CCNC: 73 U/L (ref 30–99)
ALP SERPL-CCNC: 74 U/L (ref 30–99)
ALP SERPL-CCNC: 76 U/L (ref 30–99)
ALP SERPL-CCNC: 76 U/L (ref 30–99)
ALP SERPL-CCNC: 78 U/L (ref 30–99)
ALP SERPL-CCNC: 78 U/L (ref 30–99)
ALP SERPL-CCNC: 79 U/L (ref 30–99)
ALP SERPL-CCNC: 79 U/L (ref 30–99)
ALP SERPL-CCNC: 80 U/L (ref 30–99)
ALP SERPL-CCNC: 81 U/L (ref 30–99)
ALP SERPL-CCNC: 82 U/L (ref 30–99)
ALP SERPL-CCNC: 83 U/L (ref 30–99)
ALP SERPL-CCNC: 84 U/L (ref 30–99)
ALP SERPL-CCNC: 86 U/L (ref 30–99)
ALP SERPL-CCNC: 88 U/L (ref 30–99)
ALP SERPL-CCNC: 88 U/L (ref 30–99)
ALP SERPL-CCNC: 90 U/L (ref 30–99)
ALP SERPL-CCNC: 90 U/L (ref 30–99)
ALP SERPL-CCNC: 91 U/L (ref 30–99)
ALP SERPL-CCNC: 92 U/L (ref 30–99)
ALPHA1 GLOB SERPL ELPH-MCNC: 0.23 G/DL (ref 0.19–0.46)
ALPHA2 GLOB SERPL ELPH-MCNC: 0.55 G/DL (ref 0.48–1.05)
ALT SERPL-CCNC: 105 U/L (ref 2–50)
ALT SERPL-CCNC: 114 U/L (ref 2–50)
ALT SERPL-CCNC: 119 U/L (ref 2–50)
ALT SERPL-CCNC: 123 U/L (ref 2–50)
ALT SERPL-CCNC: 140 U/L (ref 2–50)
ALT SERPL-CCNC: 145 U/L (ref 2–50)
ALT SERPL-CCNC: 17 U/L (ref 2–50)
ALT SERPL-CCNC: 18 U/L (ref 2–50)
ALT SERPL-CCNC: 19 U/L (ref 2–50)
ALT SERPL-CCNC: 19 U/L (ref 2–50)
ALT SERPL-CCNC: 20 U/L (ref 2–50)
ALT SERPL-CCNC: 21 U/L (ref 2–50)
ALT SERPL-CCNC: 22 U/L (ref 2–50)
ALT SERPL-CCNC: 22 U/L (ref 2–50)
ALT SERPL-CCNC: 23 U/L (ref 2–50)
ALT SERPL-CCNC: 24 U/L (ref 2–50)
ALT SERPL-CCNC: 31 U/L (ref 2–50)
ALT SERPL-CCNC: 32 U/L (ref 2–50)
ALT SERPL-CCNC: 37 U/L (ref 2–50)
ALT SERPL-CCNC: 40 U/L (ref 2–50)
ALT SERPL-CCNC: 44 U/L (ref 2–50)
ALT SERPL-CCNC: 58 U/L (ref 2–50)
ALT SERPL-CCNC: 61 U/L (ref 2–50)
ALT SERPL-CCNC: 62 U/L (ref 2–50)
ALT SERPL-CCNC: 67 U/L (ref 2–50)
ALT SERPL-CCNC: 68 U/L (ref 2–50)
ALT SERPL-CCNC: 76 U/L (ref 2–50)
ALT SERPL-CCNC: 77 U/L (ref 2–50)
ALT SERPL-CCNC: 78 U/L (ref 2–50)
ALT SERPL-CCNC: 78 U/L (ref 2–50)
ALT SERPL-CCNC: 79 U/L (ref 2–50)
ALT SERPL-CCNC: 8 U/L (ref 2–50)
ALT SERPL-CCNC: 86 U/L (ref 2–50)
ALT SERPL-CCNC: 88 U/L (ref 2–50)
ALT SERPL-CCNC: 90 U/L (ref 2–50)
ALT SERPL-CCNC: 99 U/L (ref 2–50)
AMMONIA PLAS-SCNC: 26 UMOL/L (ref 11–45)
AMMONIA PLAS-SCNC: 32 UMOL/L (ref 11–45)
ANA INTERPRETIVE COMMENT Q5143: ABNORMAL
ANA PATTERN Q5144: ABNORMAL
ANA TITER Q5145: ABNORMAL
ANION GAP SERPL CALC-SCNC: 10 MMOL/L (ref 7–16)
ANION GAP SERPL CALC-SCNC: 11 MMOL/L (ref 7–16)
ANION GAP SERPL CALC-SCNC: 12 MMOL/L (ref 7–16)
ANION GAP SERPL CALC-SCNC: 12 MMOL/L (ref 7–16)
ANION GAP SERPL CALC-SCNC: 13 MMOL/L (ref 7–16)
ANION GAP SERPL CALC-SCNC: 15 MMOL/L (ref 7–16)
ANION GAP SERPL CALC-SCNC: 16 MMOL/L (ref 7–16)
ANION GAP SERPL CALC-SCNC: 17 MMOL/L (ref 7–16)
ANION GAP SERPL CALC-SCNC: 18 MMOL/L (ref 7–16)
ANION GAP SERPL CALC-SCNC: 23 MMOL/L (ref 7–16)
ANION GAP SERPL CALC-SCNC: 4 MMOL/L (ref 7–16)
ANION GAP SERPL CALC-SCNC: 5 MMOL/L (ref 7–16)
ANION GAP SERPL CALC-SCNC: 6 MMOL/L (ref 7–16)
ANION GAP SERPL CALC-SCNC: 7 MMOL/L (ref 7–16)
ANION GAP SERPL CALC-SCNC: 8 MMOL/L (ref 7–16)
ANION GAP SERPL CALC-SCNC: 9 MMOL/L (ref 7–16)
ANISOCYTOSIS BLD QL SMEAR: ABNORMAL
ANTINUCLEAR ANTIBODY (ANA), HEP-2, IGG Q5142: DETECTED
APPEARANCE UR: ABNORMAL
APPEARANCE UR: ABNORMAL
APPEARANCE UR: CLEAR
APTT PPP: 21.5 SEC (ref 24.7–36)
APTT PPP: 22.1 SEC (ref 24.7–36)
APTT PPP: 22.6 SEC (ref 24.7–36)
APTT PPP: 22.6 SEC (ref 24.7–36)
APTT PPP: 22.8 SEC (ref 24.7–36)
APTT PPP: 22.8 SEC (ref 24.7–36)
APTT PPP: 23.2 SEC (ref 24.7–36)
APTT PPP: 23.2 SEC (ref 24.7–36)
APTT PPP: 23.9 SEC (ref 24.7–36)
APTT PPP: 26.2 SEC (ref 24.7–36)
APTT PPP: 40.2 SEC (ref 24.7–36)
APTT PPP: 43.4 SEC (ref 24.7–36)
AST SERPL-CCNC: 117 U/L (ref 12–45)
AST SERPL-CCNC: 122 U/L (ref 12–45)
AST SERPL-CCNC: 14 U/L (ref 12–45)
AST SERPL-CCNC: 18 U/L (ref 12–45)
AST SERPL-CCNC: 19 U/L (ref 12–45)
AST SERPL-CCNC: 20 U/L (ref 12–45)
AST SERPL-CCNC: 20 U/L (ref 12–45)
AST SERPL-CCNC: 22 U/L (ref 12–45)
AST SERPL-CCNC: 22 U/L (ref 12–45)
AST SERPL-CCNC: 23 U/L (ref 12–45)
AST SERPL-CCNC: 24 U/L (ref 12–45)
AST SERPL-CCNC: 25 U/L (ref 12–45)
AST SERPL-CCNC: 25 U/L (ref 12–45)
AST SERPL-CCNC: 26 U/L (ref 12–45)
AST SERPL-CCNC: 26 U/L (ref 12–45)
AST SERPL-CCNC: 27 U/L (ref 12–45)
AST SERPL-CCNC: 28 U/L (ref 12–45)
AST SERPL-CCNC: 29 U/L (ref 12–45)
AST SERPL-CCNC: 29 U/L (ref 12–45)
AST SERPL-CCNC: 30 U/L (ref 12–45)
AST SERPL-CCNC: 31 U/L (ref 12–45)
AST SERPL-CCNC: 32 U/L (ref 12–45)
AST SERPL-CCNC: 33 U/L (ref 12–45)
AST SERPL-CCNC: 34 U/L (ref 12–45)
AST SERPL-CCNC: 35 U/L (ref 12–45)
AST SERPL-CCNC: 36 U/L (ref 12–45)
AST SERPL-CCNC: 39 U/L (ref 12–45)
AST SERPL-CCNC: 46 U/L (ref 12–45)
AST SERPL-CCNC: 56 U/L (ref 12–45)
AST SERPL-CCNC: 57 U/L (ref 12–45)
AST SERPL-CCNC: 62 U/L (ref 12–45)
AST SERPL-CCNC: 79 U/L (ref 12–45)
B-GLOBULIN SERPL ELPH-MCNC: 0.55 G/DL (ref 0.48–1.1)
BACTERIA #/AREA URNS HPF: ABNORMAL /HPF
BACTERIA #/AREA URNS HPF: ABNORMAL /HPF
BACTERIA #/AREA URNS HPF: NEGATIVE /HPF
BACTERIA #/AREA URNS HPF: NEGATIVE /HPF
BACTERIA BLD CULT: ABNORMAL
BACTERIA BLD CULT: ABNORMAL
BACTERIA BLD CULT: NORMAL
BACTERIA STL CULT: NORMAL
BACTERIA UR CULT: ABNORMAL
BACTERIA UR CULT: ABNORMAL
BACTERIA UR CULT: NORMAL
BACTERIA UR CULT: NORMAL
BARCODED ABORH UBTYP: 6200
BARCODED ABORH UBTYP: 9500
BARCODED PRD CODE UBPRD: NORMAL
BARCODED PRD CODE UBPRD: NORMAL
BARCODED UNIT NUM UBUNT: NORMAL
BARCODED UNIT NUM UBUNT: NORMAL
BASE EXCESS BLDA CALC-SCNC: -3 MMOL/L (ref -4–3)
BASE EXCESS BLDA CALC-SCNC: -4 MMOL/L (ref -4–3)
BASE EXCESS BLDA CALC-SCNC: -6 MMOL/L (ref -4–3)
BASOPHILS # BLD AUTO: 0 % (ref 0–1.8)
BASOPHILS # BLD AUTO: 0.1 % (ref 0–1.8)
BASOPHILS # BLD AUTO: 0.2 % (ref 0–1.8)
BASOPHILS # BLD AUTO: 0.3 % (ref 0–1.8)
BASOPHILS # BLD AUTO: 0.4 % (ref 0–1.8)
BASOPHILS # BLD AUTO: 0.5 % (ref 0–1.8)
BASOPHILS # BLD AUTO: 0.5 % (ref 0–1.8)
BASOPHILS # BLD AUTO: 0.9 % (ref 0–1.8)
BASOPHILS # BLD AUTO: 0.9 % (ref 0–1.8)
BASOPHILS # BLD AUTO: 2.6 % (ref 0–1.8)
BASOPHILS # BLD: 0 K/UL (ref 0–0.12)
BASOPHILS # BLD: 0.01 K/UL (ref 0–0.12)
BASOPHILS # BLD: 0.02 K/UL (ref 0–0.12)
BASOPHILS # BLD: 0.03 K/UL (ref 0–0.12)
BASOPHILS # BLD: 0.04 K/UL (ref 0–0.12)
BASOPHILS # BLD: 0.06 K/UL (ref 0–0.12)
BASOPHILS # BLD: 0.06 K/UL (ref 0–0.12)
BASOPHILS # BLD: 0.08 K/UL (ref 0–0.12)
BASOPHILS # BLD: 0.11 K/UL (ref 0–0.12)
BASOPHILS # BLD: 0.14 K/UL (ref 0–0.12)
BASOPHILS # BLD: 0.15 K/UL (ref 0–0.12)
BASOPHILS # BLD: 0.39 K/UL (ref 0–0.12)
BILIRUB SERPL-MCNC: 0.3 MG/DL (ref 0.1–1.5)
BILIRUB SERPL-MCNC: 0.4 MG/DL (ref 0.1–1.5)
BILIRUB SERPL-MCNC: 0.5 MG/DL (ref 0.1–1.5)
BILIRUB SERPL-MCNC: 0.6 MG/DL (ref 0.1–1.5)
BILIRUB SERPL-MCNC: 0.7 MG/DL (ref 0.1–1.5)
BILIRUB SERPL-MCNC: 0.8 MG/DL (ref 0.1–1.5)
BILIRUB SERPL-MCNC: 1 MG/DL (ref 0.1–1.5)
BILIRUB SERPL-MCNC: 1.8 MG/DL (ref 0.1–1.5)
BILIRUB SERPL-MCNC: 2.8 MG/DL (ref 0.1–1.5)
BILIRUB UR QL STRIP.AUTO: ABNORMAL
BILIRUB UR QL STRIP.AUTO: ABNORMAL
BILIRUB UR QL STRIP.AUTO: NEGATIVE
BLD GP AB SCN SERPL QL: NORMAL
BODY TEMPERATURE: ABNORMAL DEGREES
BUN SERPL-MCNC: 100 MG/DL (ref 8–22)
BUN SERPL-MCNC: 102 MG/DL (ref 8–22)
BUN SERPL-MCNC: 106 MG/DL (ref 8–22)
BUN SERPL-MCNC: 15 MG/DL (ref 8–22)
BUN SERPL-MCNC: 15 MG/DL (ref 8–22)
BUN SERPL-MCNC: 16 MG/DL (ref 8–22)
BUN SERPL-MCNC: 16 MG/DL (ref 8–22)
BUN SERPL-MCNC: 17 MG/DL (ref 8–22)
BUN SERPL-MCNC: 18 MG/DL (ref 8–22)
BUN SERPL-MCNC: 19 MG/DL (ref 8–22)
BUN SERPL-MCNC: 20 MG/DL (ref 8–22)
BUN SERPL-MCNC: 20 MG/DL (ref 8–22)
BUN SERPL-MCNC: 21 MG/DL (ref 8–22)
BUN SERPL-MCNC: 22 MG/DL (ref 8–22)
BUN SERPL-MCNC: 22 MG/DL (ref 8–22)
BUN SERPL-MCNC: 23 MG/DL (ref 8–22)
BUN SERPL-MCNC: 24 MG/DL (ref 8–22)
BUN SERPL-MCNC: 25 MG/DL (ref 8–22)
BUN SERPL-MCNC: 26 MG/DL (ref 8–22)
BUN SERPL-MCNC: 27 MG/DL (ref 8–22)
BUN SERPL-MCNC: 28 MG/DL (ref 8–22)
BUN SERPL-MCNC: 29 MG/DL (ref 8–22)
BUN SERPL-MCNC: 30 MG/DL (ref 8–22)
BUN SERPL-MCNC: 31 MG/DL (ref 8–22)
BUN SERPL-MCNC: 31 MG/DL (ref 8–22)
BUN SERPL-MCNC: 32 MG/DL (ref 8–22)
BUN SERPL-MCNC: 33 MG/DL (ref 8–22)
BUN SERPL-MCNC: 35 MG/DL (ref 8–22)
BUN SERPL-MCNC: 35 MG/DL (ref 8–22)
BUN SERPL-MCNC: 37 MG/DL (ref 8–22)
BUN SERPL-MCNC: 47 MG/DL (ref 8–22)
BUN SERPL-MCNC: 85 MG/DL (ref 8–22)
BUN SERPL-MCNC: 94 MG/DL (ref 8–22)
BURR CELLS BLD QL SMEAR: NORMAL
C DIFF DNA SPEC QL NAA+PROBE: NEGATIVE
C DIFF DNA SPEC QL NAA+PROBE: NEGATIVE
C DIFF TOX GENS STL QL NAA+PROBE: NEGATIVE
C DIFF TOX GENS STL QL NAA+PROBE: NEGATIVE
C JEJUNI+C COLI AG STL QL: NORMAL
CALCIUM SERPL-MCNC: 6.5 MG/DL (ref 8.5–10.5)
CALCIUM SERPL-MCNC: 7.3 MG/DL (ref 8.5–10.5)
CALCIUM SERPL-MCNC: 7.6 MG/DL (ref 8.5–10.5)
CALCIUM SERPL-MCNC: 7.7 MG/DL (ref 8.5–10.5)
CALCIUM SERPL-MCNC: 7.8 MG/DL (ref 8.5–10.5)
CALCIUM SERPL-MCNC: 7.9 MG/DL (ref 8.5–10.5)
CALCIUM SERPL-MCNC: 7.9 MG/DL (ref 8.5–10.5)
CALCIUM SERPL-MCNC: 8 MG/DL (ref 8.5–10.5)
CALCIUM SERPL-MCNC: 8.1 MG/DL (ref 8.5–10.5)
CALCIUM SERPL-MCNC: 8.2 MG/DL (ref 8.5–10.5)
CALCIUM SERPL-MCNC: 8.3 MG/DL (ref 8.5–10.5)
CALCIUM SERPL-MCNC: 8.4 MG/DL (ref 8.5–10.5)
CALCIUM SERPL-MCNC: 8.5 MG/DL (ref 8.5–10.5)
CALCIUM SERPL-MCNC: 8.6 MG/DL (ref 8.5–10.5)
CALCIUM SERPL-MCNC: 8.7 MG/DL (ref 8.5–10.5)
CALCIUM SERPL-MCNC: 8.8 MG/DL (ref 8.5–10.5)
CALCIUM SERPL-MCNC: 8.8 MG/DL (ref 8.5–10.5)
CALCIUM SERPL-MCNC: 8.9 MG/DL (ref 8.5–10.5)
CALCIUM SERPL-MCNC: 8.9 MG/DL (ref 8.5–10.5)
CALCIUM SERPL-MCNC: 9 MG/DL (ref 8.5–10.5)
CALCIUM SERPL-MCNC: 9.1 MG/DL (ref 8.5–10.5)
CALCIUM SERPL-MCNC: 9.1 MG/DL (ref 8.5–10.5)
CALCIUM SERPL-MCNC: 9.2 MG/DL (ref 8.5–10.5)
CFT BLD TEG: 4.2 MIN (ref 5–10)
CFT P HPASE BLD TEG: 4.2 MIN (ref 5–10)
CHLORIDE SERPL-SCNC: 100 MMOL/L (ref 96–112)
CHLORIDE SERPL-SCNC: 101 MMOL/L (ref 96–112)
CHLORIDE SERPL-SCNC: 102 MMOL/L (ref 96–112)
CHLORIDE SERPL-SCNC: 103 MMOL/L (ref 96–112)
CHLORIDE SERPL-SCNC: 104 MMOL/L (ref 96–112)
CHLORIDE SERPL-SCNC: 105 MMOL/L (ref 96–112)
CHLORIDE SERPL-SCNC: 106 MMOL/L (ref 96–112)
CHLORIDE SERPL-SCNC: 108 MMOL/L (ref 96–112)
CHLORIDE SERPL-SCNC: 109 MMOL/L (ref 96–112)
CHLORIDE SERPL-SCNC: 116 MMOL/L (ref 96–112)
CHLORIDE SERPL-SCNC: 116 MMOL/L (ref 96–112)
CHLORIDE SERPL-SCNC: 119 MMOL/L (ref 96–112)
CHLORIDE SERPL-SCNC: 119 MMOL/L (ref 96–112)
CHLORIDE SERPL-SCNC: 92 MMOL/L (ref 96–112)
CHLORIDE SERPL-SCNC: 95 MMOL/L (ref 96–112)
CHLORIDE SERPL-SCNC: 95 MMOL/L (ref 96–112)
CHLORIDE SERPL-SCNC: 96 MMOL/L (ref 96–112)
CHLORIDE SERPL-SCNC: 97 MMOL/L (ref 96–112)
CHLORIDE SERPL-SCNC: 98 MMOL/L (ref 96–112)
CHLORIDE SERPL-SCNC: 99 MMOL/L (ref 96–112)
CHOLEST SERPL-MCNC: 125 MG/DL (ref 100–199)
CK SERPL-CCNC: 1000 U/L (ref 0–154)
CK SERPL-CCNC: 189 U/L (ref 0–154)
CK SERPL-CCNC: 333 U/L (ref 0–154)
CK SERPL-CCNC: 482 U/L (ref 0–154)
CK SERPL-CCNC: 576 U/L (ref 0–154)
CK SERPL-CCNC: 612 U/L (ref 0–154)
CK SERPL-CCNC: 75 U/L (ref 0–154)
CK SERPL-CCNC: 80 U/L (ref 0–154)
CK SERPL-CCNC: 828 U/L (ref 0–154)
CLOT ANGLE BLD TEG: 68.5 DEGREES (ref 53–72)
CLOT ANGLE P HPASE BLD TEG: 71.1 DEGREES (ref 53–72)
CLOT INIT P HPASE BLD TEG: 1.3 MIN (ref 1–3)
CLOT LYSIS 30M P MA LENFR BLD TEG: 0 % (ref 0–8)
CLOT LYSIS 30M P MA LENFR BLD TEG: 0 % (ref 0–8)
CO2 BLDA-SCNC: 20 MMOL/L (ref 20–33)
CO2 BLDA-SCNC: 22 MMOL/L (ref 20–33)
CO2 BLDA-SCNC: 23 MMOL/L (ref 20–33)
CO2 SERPL-SCNC: 18 MMOL/L (ref 20–33)
CO2 SERPL-SCNC: 19 MMOL/L (ref 20–33)
CO2 SERPL-SCNC: 20 MMOL/L (ref 20–33)
CO2 SERPL-SCNC: 21 MMOL/L (ref 20–33)
CO2 SERPL-SCNC: 22 MMOL/L (ref 20–33)
CO2 SERPL-SCNC: 23 MMOL/L (ref 20–33)
CO2 SERPL-SCNC: 24 MMOL/L (ref 20–33)
CO2 SERPL-SCNC: 25 MMOL/L (ref 20–33)
CO2 SERPL-SCNC: 26 MMOL/L (ref 20–33)
CO2 SERPL-SCNC: 27 MMOL/L (ref 20–33)
CO2 SERPL-SCNC: 28 MMOL/L (ref 20–33)
COLOR UR: ABNORMAL
COLOR UR: ABNORMAL
COLOR UR: YELLOW
COMMENT 1642: NORMAL
COMMENT 1642: NORMAL
COMPONENT P 8504P: NORMAL
COMPONENT P 8504P: NORMAL
CORTIS SERPL-MCNC: 0.6 UG/DL (ref 0–23)
CORTIS SERPL-MCNC: 1.1 UG/DL (ref 0–23)
CORTIS SERPL-MCNC: 10.5 UG/DL (ref 0–23)
CORTIS SERPL-MCNC: 3.1 UG/DL (ref 0–23)
CORTIS SERPL-MCNC: 49.5 UG/DL (ref 0–23)
CORTIS SERPL-MCNC: 7.7 UG/DL (ref 0–23)
CREAT SERPL-MCNC: 0.57 MG/DL (ref 0.5–1.4)
CREAT SERPL-MCNC: 0.62 MG/DL (ref 0.5–1.4)
CREAT SERPL-MCNC: 0.66 MG/DL (ref 0.5–1.4)
CREAT SERPL-MCNC: 0.69 MG/DL (ref 0.5–1.4)
CREAT SERPL-MCNC: 0.71 MG/DL (ref 0.5–1.4)
CREAT SERPL-MCNC: 0.72 MG/DL (ref 0.5–1.4)
CREAT SERPL-MCNC: 0.74 MG/DL (ref 0.5–1.4)
CREAT SERPL-MCNC: 0.76 MG/DL (ref 0.5–1.4)
CREAT SERPL-MCNC: 0.76 MG/DL (ref 0.5–1.4)
CREAT SERPL-MCNC: 0.78 MG/DL (ref 0.5–1.4)
CREAT SERPL-MCNC: 0.78 MG/DL (ref 0.5–1.4)
CREAT SERPL-MCNC: 0.79 MG/DL (ref 0.5–1.4)
CREAT SERPL-MCNC: 0.79 MG/DL (ref 0.5–1.4)
CREAT SERPL-MCNC: 0.8 MG/DL (ref 0.5–1.4)
CREAT SERPL-MCNC: 0.8 MG/DL (ref 0.5–1.4)
CREAT SERPL-MCNC: 0.81 MG/DL (ref 0.5–1.4)
CREAT SERPL-MCNC: 0.83 MG/DL (ref 0.5–1.4)
CREAT SERPL-MCNC: 0.84 MG/DL (ref 0.5–1.4)
CREAT SERPL-MCNC: 0.85 MG/DL (ref 0.5–1.4)
CREAT SERPL-MCNC: 0.86 MG/DL (ref 0.5–1.4)
CREAT SERPL-MCNC: 0.86 MG/DL (ref 0.5–1.4)
CREAT SERPL-MCNC: 0.87 MG/DL (ref 0.5–1.4)
CREAT SERPL-MCNC: 0.9 MG/DL (ref 0.5–1.4)
CREAT SERPL-MCNC: 0.94 MG/DL (ref 0.5–1.4)
CREAT SERPL-MCNC: 0.95 MG/DL (ref 0.5–1.4)
CREAT SERPL-MCNC: 0.96 MG/DL (ref 0.5–1.4)
CREAT SERPL-MCNC: 0.97 MG/DL (ref 0.5–1.4)
CREAT SERPL-MCNC: 0.98 MG/DL (ref 0.5–1.4)
CREAT SERPL-MCNC: 1.01 MG/DL (ref 0.5–1.4)
CREAT SERPL-MCNC: 1.02 MG/DL (ref 0.5–1.4)
CREAT SERPL-MCNC: 1.02 MG/DL (ref 0.5–1.4)
CREAT SERPL-MCNC: 1.03 MG/DL (ref 0.5–1.4)
CREAT SERPL-MCNC: 1.05 MG/DL (ref 0.5–1.4)
CREAT SERPL-MCNC: 1.05 MG/DL (ref 0.5–1.4)
CREAT SERPL-MCNC: 1.06 MG/DL (ref 0.5–1.4)
CREAT SERPL-MCNC: 1.12 MG/DL (ref 0.5–1.4)
CREAT SERPL-MCNC: 1.13 MG/DL (ref 0.5–1.4)
CREAT SERPL-MCNC: 1.13 MG/DL (ref 0.5–1.4)
CREAT SERPL-MCNC: 1.14 MG/DL (ref 0.5–1.4)
CREAT SERPL-MCNC: 1.15 MG/DL (ref 0.5–1.4)
CREAT SERPL-MCNC: 1.15 MG/DL (ref 0.5–1.4)
CREAT SERPL-MCNC: 1.21 MG/DL (ref 0.5–1.4)
CREAT SERPL-MCNC: 1.26 MG/DL (ref 0.5–1.4)
CREAT SERPL-MCNC: 1.54 MG/DL (ref 0.5–1.4)
CREAT SERPL-MCNC: 1.77 MG/DL (ref 0.5–1.4)
CREAT SERPL-MCNC: 1.78 MG/DL (ref 0.5–1.4)
CREAT SERPL-MCNC: 1.83 MG/DL (ref 0.5–1.4)
CREAT SERPL-MCNC: 2.15 MG/DL (ref 0.5–1.4)
CREAT SERPL-MCNC: 2.26 MG/DL (ref 0.5–1.4)
CREAT SERPL-MCNC: 2.34 MG/DL (ref 0.5–1.4)
CRP SERPL HS-MCNC: 17 MG/DL (ref 0–0.75)
CRP SERPL HS-MCNC: 8.78 MG/DL (ref 0–0.75)
CRP SERPL HS-MCNC: <0.3 MG/DL (ref 0–0.75)
CT.EXTRINSIC BLD ROTEM: 1.7 MIN (ref 1–3)
D DIMER PPP IA.FEU-MCNC: >20 UG/ML (FEU) (ref 0–0.5)
DACRYOCYTES BLD QL SMEAR: NORMAL
DACRYOCYTES BLD QL SMEAR: NORMAL
DSDNA AB TITR SER CLIF: NORMAL {TITER}
E COLI SXT1+2 STL IA: NORMAL
E COLI SXT1+2 STL IA: NORMAL
EKG IMPRESSION: NORMAL
ENA JO1 AB TITR SER: 2 AU/ML (ref 0–40)
ENA SCL70 IGG SER QL: 6 AU/ML (ref 0–40)
ENA SM IGG SER-ACNC: 4 AU/ML (ref 0–40)
ENA SS-B IGG SER IA-ACNC: 8 AU/ML (ref 0–40)
EOSINOPHIL # BLD AUTO: 0 K/UL (ref 0–0.51)
EOSINOPHIL # BLD AUTO: 0.01 K/UL (ref 0–0.51)
EOSINOPHIL # BLD AUTO: 0.02 K/UL (ref 0–0.51)
EOSINOPHIL # BLD AUTO: 0.03 K/UL (ref 0–0.51)
EOSINOPHIL # BLD AUTO: 0.03 K/UL (ref 0–0.51)
EOSINOPHIL # BLD AUTO: 0.04 K/UL (ref 0–0.51)
EOSINOPHIL # BLD AUTO: 0.05 K/UL (ref 0–0.51)
EOSINOPHIL # BLD AUTO: 0.06 K/UL (ref 0–0.51)
EOSINOPHIL # BLD AUTO: 0.07 K/UL (ref 0–0.51)
EOSINOPHIL # BLD AUTO: 0.11 K/UL (ref 0–0.51)
EOSINOPHIL # BLD AUTO: 0.12 K/UL (ref 0–0.51)
EOSINOPHIL # BLD AUTO: 0.14 K/UL (ref 0–0.51)
EOSINOPHIL # BLD AUTO: 0.15 K/UL (ref 0–0.51)
EOSINOPHIL # BLD AUTO: 0.16 K/UL (ref 0–0.51)
EOSINOPHIL # BLD AUTO: 0.19 K/UL (ref 0–0.51)
EOSINOPHIL # BLD AUTO: 0.28 K/UL (ref 0–0.51)
EOSINOPHIL # BLD AUTO: 0.95 K/UL (ref 0–0.51)
EOSINOPHIL NFR BLD: 0 % (ref 0–6.9)
EOSINOPHIL NFR BLD: 0.1 % (ref 0–6.9)
EOSINOPHIL NFR BLD: 0.2 % (ref 0–6.9)
EOSINOPHIL NFR BLD: 0.3 % (ref 0–6.9)
EOSINOPHIL NFR BLD: 0.4 % (ref 0–6.9)
EOSINOPHIL NFR BLD: 0.6 % (ref 0–6.9)
EOSINOPHIL NFR BLD: 0.6 % (ref 0–6.9)
EOSINOPHIL NFR BLD: 0.7 % (ref 0–6.9)
EOSINOPHIL NFR BLD: 0.8 % (ref 0–6.9)
EOSINOPHIL NFR BLD: 0.9 % (ref 0–6.9)
EOSINOPHIL NFR BLD: 1.1 % (ref 0–6.9)
EOSINOPHIL NFR BLD: 1.3 % (ref 0–6.9)
EOSINOPHIL NFR BLD: 1.7 % (ref 0–6.9)
EOSINOPHIL NFR BLD: 1.7 % (ref 0–6.9)
EOSINOPHIL NFR BLD: 2.1 % (ref 0–6.9)
EOSINOPHIL NFR BLD: 6.2 % (ref 0–6.9)
EPI CELLS #/AREA URNS HPF: ABNORMAL /HPF
EPI CELLS #/AREA URNS HPF: ABNORMAL /HPF
EPI CELLS #/AREA URNS HPF: NEGATIVE /HPF
EPI CELLS #/AREA URNS HPF: NEGATIVE /HPF
ERYTHROCYTE [DISTWIDTH] IN BLOOD BY AUTOMATED COUNT: 56.8 FL (ref 35.9–50)
ERYTHROCYTE [DISTWIDTH] IN BLOOD BY AUTOMATED COUNT: 57.1 FL (ref 35.9–50)
ERYTHROCYTE [DISTWIDTH] IN BLOOD BY AUTOMATED COUNT: 57.3 FL (ref 35.9–50)
ERYTHROCYTE [DISTWIDTH] IN BLOOD BY AUTOMATED COUNT: 58.2 FL (ref 35.9–50)
ERYTHROCYTE [DISTWIDTH] IN BLOOD BY AUTOMATED COUNT: 58.2 FL (ref 35.9–50)
ERYTHROCYTE [DISTWIDTH] IN BLOOD BY AUTOMATED COUNT: 58.3 FL (ref 35.9–50)
ERYTHROCYTE [DISTWIDTH] IN BLOOD BY AUTOMATED COUNT: 58.4 FL (ref 35.9–50)
ERYTHROCYTE [DISTWIDTH] IN BLOOD BY AUTOMATED COUNT: 59.1 FL (ref 35.9–50)
ERYTHROCYTE [DISTWIDTH] IN BLOOD BY AUTOMATED COUNT: 59.1 FL (ref 35.9–50)
ERYTHROCYTE [DISTWIDTH] IN BLOOD BY AUTOMATED COUNT: 59.3 FL (ref 35.9–50)
ERYTHROCYTE [DISTWIDTH] IN BLOOD BY AUTOMATED COUNT: 59.3 FL (ref 35.9–50)
ERYTHROCYTE [DISTWIDTH] IN BLOOD BY AUTOMATED COUNT: 59.4 FL (ref 35.9–50)
ERYTHROCYTE [DISTWIDTH] IN BLOOD BY AUTOMATED COUNT: 59.5 FL (ref 35.9–50)
ERYTHROCYTE [DISTWIDTH] IN BLOOD BY AUTOMATED COUNT: 59.6 FL (ref 35.9–50)
ERYTHROCYTE [DISTWIDTH] IN BLOOD BY AUTOMATED COUNT: 59.7 FL (ref 35.9–50)
ERYTHROCYTE [DISTWIDTH] IN BLOOD BY AUTOMATED COUNT: 59.7 FL (ref 35.9–50)
ERYTHROCYTE [DISTWIDTH] IN BLOOD BY AUTOMATED COUNT: 59.8 FL (ref 35.9–50)
ERYTHROCYTE [DISTWIDTH] IN BLOOD BY AUTOMATED COUNT: 59.8 FL (ref 35.9–50)
ERYTHROCYTE [DISTWIDTH] IN BLOOD BY AUTOMATED COUNT: 60 FL (ref 35.9–50)
ERYTHROCYTE [DISTWIDTH] IN BLOOD BY AUTOMATED COUNT: 60.1 FL (ref 35.9–50)
ERYTHROCYTE [DISTWIDTH] IN BLOOD BY AUTOMATED COUNT: 60.2 FL (ref 35.9–50)
ERYTHROCYTE [DISTWIDTH] IN BLOOD BY AUTOMATED COUNT: 60.4 FL (ref 35.9–50)
ERYTHROCYTE [DISTWIDTH] IN BLOOD BY AUTOMATED COUNT: 60.5 FL (ref 35.9–50)
ERYTHROCYTE [DISTWIDTH] IN BLOOD BY AUTOMATED COUNT: 60.6 FL (ref 35.9–50)
ERYTHROCYTE [DISTWIDTH] IN BLOOD BY AUTOMATED COUNT: 60.9 FL (ref 35.9–50)
ERYTHROCYTE [DISTWIDTH] IN BLOOD BY AUTOMATED COUNT: 61 FL (ref 35.9–50)
ERYTHROCYTE [DISTWIDTH] IN BLOOD BY AUTOMATED COUNT: 61.2 FL (ref 35.9–50)
ERYTHROCYTE [DISTWIDTH] IN BLOOD BY AUTOMATED COUNT: 61.2 FL (ref 35.9–50)
ERYTHROCYTE [DISTWIDTH] IN BLOOD BY AUTOMATED COUNT: 61.5 FL (ref 35.9–50)
ERYTHROCYTE [DISTWIDTH] IN BLOOD BY AUTOMATED COUNT: 61.6 FL (ref 35.9–50)
ERYTHROCYTE [DISTWIDTH] IN BLOOD BY AUTOMATED COUNT: 62 FL (ref 35.9–50)
ERYTHROCYTE [DISTWIDTH] IN BLOOD BY AUTOMATED COUNT: 62.1 FL (ref 35.9–50)
ERYTHROCYTE [DISTWIDTH] IN BLOOD BY AUTOMATED COUNT: 62.2 FL (ref 35.9–50)
ERYTHROCYTE [DISTWIDTH] IN BLOOD BY AUTOMATED COUNT: 62.2 FL (ref 35.9–50)
ERYTHROCYTE [DISTWIDTH] IN BLOOD BY AUTOMATED COUNT: 62.4 FL (ref 35.9–50)
ERYTHROCYTE [DISTWIDTH] IN BLOOD BY AUTOMATED COUNT: 62.4 FL (ref 35.9–50)
ERYTHROCYTE [DISTWIDTH] IN BLOOD BY AUTOMATED COUNT: 62.5 FL (ref 35.9–50)
ERYTHROCYTE [DISTWIDTH] IN BLOOD BY AUTOMATED COUNT: 62.5 FL (ref 35.9–50)
ERYTHROCYTE [DISTWIDTH] IN BLOOD BY AUTOMATED COUNT: 62.9 FL (ref 35.9–50)
ERYTHROCYTE [DISTWIDTH] IN BLOOD BY AUTOMATED COUNT: 63 FL (ref 35.9–50)
ERYTHROCYTE [DISTWIDTH] IN BLOOD BY AUTOMATED COUNT: 63.2 FL (ref 35.9–50)
ERYTHROCYTE [DISTWIDTH] IN BLOOD BY AUTOMATED COUNT: 63.4 FL (ref 35.9–50)
ERYTHROCYTE [DISTWIDTH] IN BLOOD BY AUTOMATED COUNT: 63.5 FL (ref 35.9–50)
ERYTHROCYTE [DISTWIDTH] IN BLOOD BY AUTOMATED COUNT: 63.6 FL (ref 35.9–50)
ERYTHROCYTE [DISTWIDTH] IN BLOOD BY AUTOMATED COUNT: 63.7 FL (ref 35.9–50)
ERYTHROCYTE [DISTWIDTH] IN BLOOD BY AUTOMATED COUNT: 63.7 FL (ref 35.9–50)
ERYTHROCYTE [DISTWIDTH] IN BLOOD BY AUTOMATED COUNT: 64 FL (ref 35.9–50)
ERYTHROCYTE [DISTWIDTH] IN BLOOD BY AUTOMATED COUNT: 64.3 FL (ref 35.9–50)
ERYTHROCYTE [DISTWIDTH] IN BLOOD BY AUTOMATED COUNT: 65.2 FL (ref 35.9–50)
ERYTHROCYTE [DISTWIDTH] IN BLOOD BY AUTOMATED COUNT: 65.3 FL (ref 35.9–50)
ERYTHROCYTE [SEDIMENTATION RATE] IN BLOOD BY WESTERGREN METHOD: 5 MM/HOUR (ref 0–20)
ERYTHROCYTE [SEDIMENTATION RATE] IN BLOOD BY WESTERGREN METHOD: 65 MM/HOUR (ref 0–20)
EST. AVERAGE GLUCOSE BLD GHB EST-MCNC: 123 MG/DL
EST. AVERAGE GLUCOSE BLD GHB EST-MCNC: 209 MG/DL
ETEST SENSITIVITY ETEST: NORMAL
FIBRINOGEN PPP-MCNC: 212 MG/DL (ref 215–460)
FLUAV RNA SPEC QL NAA+PROBE: NEGATIVE
FLUBV RNA SPEC QL NAA+PROBE: NEGATIVE
FOLATE SERPL-MCNC: 5.2 NG/ML
GAMMA GLOB SERPL ELPH-MCNC: 2.04 G/DL (ref 0.62–1.51)
GLOBULIN SER CALC-MCNC: 1.9 G/DL (ref 1.9–3.5)
GLOBULIN SER CALC-MCNC: 2 G/DL (ref 1.9–3.5)
GLOBULIN SER CALC-MCNC: 2.1 G/DL (ref 1.9–3.5)
GLOBULIN SER CALC-MCNC: 2.1 G/DL (ref 1.9–3.5)
GLOBULIN SER CALC-MCNC: 2.4 G/DL (ref 1.9–3.5)
GLOBULIN SER CALC-MCNC: 2.5 G/DL (ref 1.9–3.5)
GLOBULIN SER CALC-MCNC: 2.5 G/DL (ref 1.9–3.5)
GLOBULIN SER CALC-MCNC: 2.6 G/DL (ref 1.9–3.5)
GLOBULIN SER CALC-MCNC: 2.6 G/DL (ref 1.9–3.5)
GLOBULIN SER CALC-MCNC: 2.7 G/DL (ref 1.9–3.5)
GLOBULIN SER CALC-MCNC: 2.8 G/DL (ref 1.9–3.5)
GLOBULIN SER CALC-MCNC: 2.9 G/DL (ref 1.9–3.5)
GLOBULIN SER CALC-MCNC: 3 G/DL (ref 1.9–3.5)
GLOBULIN SER CALC-MCNC: 3.1 G/DL (ref 1.9–3.5)
GLOBULIN SER CALC-MCNC: 3.2 G/DL (ref 1.9–3.5)
GLOBULIN SER CALC-MCNC: 3.3 G/DL (ref 1.9–3.5)
GLOBULIN SER CALC-MCNC: 3.4 G/DL (ref 1.9–3.5)
GLOBULIN SER CALC-MCNC: 3.4 G/DL (ref 1.9–3.5)
GLOBULIN SER CALC-MCNC: 3.6 G/DL (ref 1.9–3.5)
GLOBULIN SER CALC-MCNC: 3.7 G/DL (ref 1.9–3.5)
GLOBULIN SER CALC-MCNC: 4.2 G/DL (ref 1.9–3.5)
GLUCOSE BLD-MCNC: 100 MG/DL (ref 65–99)
GLUCOSE BLD-MCNC: 103 MG/DL (ref 65–99)
GLUCOSE BLD-MCNC: 105 MG/DL (ref 65–99)
GLUCOSE BLD-MCNC: 107 MG/DL (ref 65–99)
GLUCOSE BLD-MCNC: 109 MG/DL (ref 65–99)
GLUCOSE BLD-MCNC: 109 MG/DL (ref 65–99)
GLUCOSE BLD-MCNC: 110 MG/DL (ref 65–99)
GLUCOSE BLD-MCNC: 110 MG/DL (ref 65–99)
GLUCOSE BLD-MCNC: 111 MG/DL (ref 65–99)
GLUCOSE BLD-MCNC: 111 MG/DL (ref 65–99)
GLUCOSE BLD-MCNC: 114 MG/DL (ref 65–99)
GLUCOSE BLD-MCNC: 116 MG/DL (ref 65–99)
GLUCOSE BLD-MCNC: 118 MG/DL (ref 65–99)
GLUCOSE BLD-MCNC: 119 MG/DL (ref 65–99)
GLUCOSE BLD-MCNC: 120 MG/DL (ref 65–99)
GLUCOSE BLD-MCNC: 123 MG/DL (ref 65–99)
GLUCOSE BLD-MCNC: 125 MG/DL (ref 65–99)
GLUCOSE BLD-MCNC: 126 MG/DL (ref 65–99)
GLUCOSE BLD-MCNC: 127 MG/DL (ref 65–99)
GLUCOSE BLD-MCNC: 127 MG/DL (ref 65–99)
GLUCOSE BLD-MCNC: 128 MG/DL (ref 65–99)
GLUCOSE BLD-MCNC: 128 MG/DL (ref 65–99)
GLUCOSE BLD-MCNC: 129 MG/DL (ref 65–99)
GLUCOSE BLD-MCNC: 129 MG/DL (ref 65–99)
GLUCOSE BLD-MCNC: 130 MG/DL (ref 65–99)
GLUCOSE BLD-MCNC: 131 MG/DL (ref 65–99)
GLUCOSE BLD-MCNC: 132 MG/DL (ref 65–99)
GLUCOSE BLD-MCNC: 132 MG/DL (ref 65–99)
GLUCOSE BLD-MCNC: 134 MG/DL (ref 65–99)
GLUCOSE BLD-MCNC: 135 MG/DL (ref 65–99)
GLUCOSE BLD-MCNC: 136 MG/DL (ref 65–99)
GLUCOSE BLD-MCNC: 138 MG/DL (ref 65–99)
GLUCOSE BLD-MCNC: 138 MG/DL (ref 65–99)
GLUCOSE BLD-MCNC: 139 MG/DL (ref 65–99)
GLUCOSE BLD-MCNC: 141 MG/DL (ref 65–99)
GLUCOSE BLD-MCNC: 143 MG/DL (ref 65–99)
GLUCOSE BLD-MCNC: 145 MG/DL (ref 65–99)
GLUCOSE BLD-MCNC: 145 MG/DL (ref 65–99)
GLUCOSE BLD-MCNC: 146 MG/DL (ref 65–99)
GLUCOSE BLD-MCNC: 146 MG/DL (ref 65–99)
GLUCOSE BLD-MCNC: 147 MG/DL (ref 65–99)
GLUCOSE BLD-MCNC: 147 MG/DL (ref 65–99)
GLUCOSE BLD-MCNC: 148 MG/DL (ref 65–99)
GLUCOSE BLD-MCNC: 148 MG/DL (ref 65–99)
GLUCOSE BLD-MCNC: 149 MG/DL (ref 65–99)
GLUCOSE BLD-MCNC: 150 MG/DL (ref 65–99)
GLUCOSE BLD-MCNC: 151 MG/DL (ref 65–99)
GLUCOSE BLD-MCNC: 152 MG/DL (ref 65–99)
GLUCOSE BLD-MCNC: 153 MG/DL (ref 65–99)
GLUCOSE BLD-MCNC: 154 MG/DL (ref 65–99)
GLUCOSE BLD-MCNC: 154 MG/DL (ref 65–99)
GLUCOSE BLD-MCNC: 155 MG/DL (ref 65–99)
GLUCOSE BLD-MCNC: 156 MG/DL (ref 65–99)
GLUCOSE BLD-MCNC: 157 MG/DL (ref 65–99)
GLUCOSE BLD-MCNC: 158 MG/DL (ref 65–99)
GLUCOSE BLD-MCNC: 158 MG/DL (ref 65–99)
GLUCOSE BLD-MCNC: 159 MG/DL (ref 65–99)
GLUCOSE BLD-MCNC: 160 MG/DL (ref 65–99)
GLUCOSE BLD-MCNC: 160 MG/DL (ref 65–99)
GLUCOSE BLD-MCNC: 161 MG/DL (ref 65–99)
GLUCOSE BLD-MCNC: 162 MG/DL (ref 65–99)
GLUCOSE BLD-MCNC: 162 MG/DL (ref 65–99)
GLUCOSE BLD-MCNC: 163 MG/DL (ref 65–99)
GLUCOSE BLD-MCNC: 163 MG/DL (ref 65–99)
GLUCOSE BLD-MCNC: 164 MG/DL (ref 65–99)
GLUCOSE BLD-MCNC: 166 MG/DL (ref 65–99)
GLUCOSE BLD-MCNC: 167 MG/DL (ref 65–99)
GLUCOSE BLD-MCNC: 167 MG/DL (ref 65–99)
GLUCOSE BLD-MCNC: 169 MG/DL (ref 65–99)
GLUCOSE BLD-MCNC: 171 MG/DL (ref 65–99)
GLUCOSE BLD-MCNC: 172 MG/DL (ref 65–99)
GLUCOSE BLD-MCNC: 173 MG/DL (ref 65–99)
GLUCOSE BLD-MCNC: 174 MG/DL (ref 65–99)
GLUCOSE BLD-MCNC: 175 MG/DL (ref 65–99)
GLUCOSE BLD-MCNC: 177 MG/DL (ref 65–99)
GLUCOSE BLD-MCNC: 178 MG/DL (ref 65–99)
GLUCOSE BLD-MCNC: 179 MG/DL (ref 65–99)
GLUCOSE BLD-MCNC: 181 MG/DL (ref 65–99)
GLUCOSE BLD-MCNC: 181 MG/DL (ref 65–99)
GLUCOSE BLD-MCNC: 182 MG/DL (ref 65–99)
GLUCOSE BLD-MCNC: 183 MG/DL (ref 65–99)
GLUCOSE BLD-MCNC: 185 MG/DL (ref 65–99)
GLUCOSE BLD-MCNC: 185 MG/DL (ref 65–99)
GLUCOSE BLD-MCNC: 186 MG/DL (ref 65–99)
GLUCOSE BLD-MCNC: 186 MG/DL (ref 65–99)
GLUCOSE BLD-MCNC: 188 MG/DL (ref 65–99)
GLUCOSE BLD-MCNC: 189 MG/DL (ref 65–99)
GLUCOSE BLD-MCNC: 190 MG/DL (ref 65–99)
GLUCOSE BLD-MCNC: 191 MG/DL (ref 65–99)
GLUCOSE BLD-MCNC: 191 MG/DL (ref 65–99)
GLUCOSE BLD-MCNC: 192 MG/DL (ref 65–99)
GLUCOSE BLD-MCNC: 192 MG/DL (ref 65–99)
GLUCOSE BLD-MCNC: 194 MG/DL (ref 65–99)
GLUCOSE BLD-MCNC: 196 MG/DL (ref 65–99)
GLUCOSE BLD-MCNC: 198 MG/DL (ref 65–99)
GLUCOSE BLD-MCNC: 200 MG/DL (ref 65–99)
GLUCOSE BLD-MCNC: 201 MG/DL (ref 65–99)
GLUCOSE BLD-MCNC: 201 MG/DL (ref 65–99)
GLUCOSE BLD-MCNC: 203 MG/DL (ref 65–99)
GLUCOSE BLD-MCNC: 203 MG/DL (ref 65–99)
GLUCOSE BLD-MCNC: 206 MG/DL (ref 65–99)
GLUCOSE BLD-MCNC: 206 MG/DL (ref 65–99)
GLUCOSE BLD-MCNC: 209 MG/DL (ref 65–99)
GLUCOSE BLD-MCNC: 211 MG/DL (ref 65–99)
GLUCOSE BLD-MCNC: 211 MG/DL (ref 65–99)
GLUCOSE BLD-MCNC: 214 MG/DL (ref 65–99)
GLUCOSE BLD-MCNC: 216 MG/DL (ref 65–99)
GLUCOSE BLD-MCNC: 216 MG/DL (ref 65–99)
GLUCOSE BLD-MCNC: 219 MG/DL (ref 65–99)
GLUCOSE BLD-MCNC: 219 MG/DL (ref 65–99)
GLUCOSE BLD-MCNC: 221 MG/DL (ref 65–99)
GLUCOSE BLD-MCNC: 223 MG/DL (ref 65–99)
GLUCOSE BLD-MCNC: 224 MG/DL (ref 65–99)
GLUCOSE BLD-MCNC: 224 MG/DL (ref 65–99)
GLUCOSE BLD-MCNC: 226 MG/DL (ref 65–99)
GLUCOSE BLD-MCNC: 227 MG/DL (ref 65–99)
GLUCOSE BLD-MCNC: 228 MG/DL (ref 65–99)
GLUCOSE BLD-MCNC: 231 MG/DL (ref 65–99)
GLUCOSE BLD-MCNC: 235 MG/DL (ref 65–99)
GLUCOSE BLD-MCNC: 236 MG/DL (ref 65–99)
GLUCOSE BLD-MCNC: 239 MG/DL (ref 65–99)
GLUCOSE BLD-MCNC: 240 MG/DL (ref 65–99)
GLUCOSE BLD-MCNC: 245 MG/DL (ref 65–99)
GLUCOSE BLD-MCNC: 249 MG/DL (ref 65–99)
GLUCOSE BLD-MCNC: 249 MG/DL (ref 65–99)
GLUCOSE BLD-MCNC: 250 MG/DL (ref 65–99)
GLUCOSE BLD-MCNC: 256 MG/DL (ref 65–99)
GLUCOSE BLD-MCNC: 256 MG/DL (ref 65–99)
GLUCOSE BLD-MCNC: 258 MG/DL (ref 65–99)
GLUCOSE BLD-MCNC: 267 MG/DL (ref 65–99)
GLUCOSE BLD-MCNC: 270 MG/DL (ref 65–99)
GLUCOSE BLD-MCNC: 270 MG/DL (ref 65–99)
GLUCOSE BLD-MCNC: 273 MG/DL (ref 65–99)
GLUCOSE BLD-MCNC: 276 MG/DL (ref 65–99)
GLUCOSE BLD-MCNC: 276 MG/DL (ref 65–99)
GLUCOSE BLD-MCNC: 289 MG/DL (ref 65–99)
GLUCOSE BLD-MCNC: 309 MG/DL (ref 65–99)
GLUCOSE BLD-MCNC: 350 MG/DL (ref 65–99)
GLUCOSE BLD-MCNC: 364 MG/DL (ref 65–99)
GLUCOSE BLD-MCNC: 370 MG/DL (ref 65–99)
GLUCOSE BLD-MCNC: 388 MG/DL (ref 65–99)
GLUCOSE BLD-MCNC: 402 MG/DL (ref 65–99)
GLUCOSE BLD-MCNC: 432 MG/DL (ref 65–99)
GLUCOSE BLD-MCNC: 78 MG/DL (ref 65–99)
GLUCOSE BLD-MCNC: 94 MG/DL (ref 65–99)
GLUCOSE BLD-MCNC: 97 MG/DL (ref 65–99)
GLUCOSE BLD-MCNC: 99 MG/DL (ref 65–99)
GLUCOSE SERPL-MCNC: 104 MG/DL (ref 65–99)
GLUCOSE SERPL-MCNC: 105 MG/DL (ref 65–99)
GLUCOSE SERPL-MCNC: 107 MG/DL (ref 65–99)
GLUCOSE SERPL-MCNC: 113 MG/DL (ref 65–99)
GLUCOSE SERPL-MCNC: 114 MG/DL (ref 65–99)
GLUCOSE SERPL-MCNC: 114 MG/DL (ref 65–99)
GLUCOSE SERPL-MCNC: 118 MG/DL (ref 65–99)
GLUCOSE SERPL-MCNC: 120 MG/DL (ref 65–99)
GLUCOSE SERPL-MCNC: 121 MG/DL (ref 65–99)
GLUCOSE SERPL-MCNC: 122 MG/DL (ref 65–99)
GLUCOSE SERPL-MCNC: 122 MG/DL (ref 65–99)
GLUCOSE SERPL-MCNC: 126 MG/DL (ref 65–99)
GLUCOSE SERPL-MCNC: 128 MG/DL (ref 65–99)
GLUCOSE SERPL-MCNC: 128 MG/DL (ref 65–99)
GLUCOSE SERPL-MCNC: 130 MG/DL (ref 65–99)
GLUCOSE SERPL-MCNC: 130 MG/DL (ref 65–99)
GLUCOSE SERPL-MCNC: 136 MG/DL (ref 65–99)
GLUCOSE SERPL-MCNC: 137 MG/DL (ref 65–99)
GLUCOSE SERPL-MCNC: 138 MG/DL (ref 65–99)
GLUCOSE SERPL-MCNC: 139 MG/DL (ref 65–99)
GLUCOSE SERPL-MCNC: 140 MG/DL (ref 65–99)
GLUCOSE SERPL-MCNC: 141 MG/DL (ref 65–99)
GLUCOSE SERPL-MCNC: 142 MG/DL (ref 65–99)
GLUCOSE SERPL-MCNC: 151 MG/DL (ref 65–99)
GLUCOSE SERPL-MCNC: 154 MG/DL (ref 65–99)
GLUCOSE SERPL-MCNC: 154 MG/DL (ref 65–99)
GLUCOSE SERPL-MCNC: 155 MG/DL (ref 65–99)
GLUCOSE SERPL-MCNC: 157 MG/DL (ref 65–99)
GLUCOSE SERPL-MCNC: 158 MG/DL (ref 65–99)
GLUCOSE SERPL-MCNC: 159 MG/DL (ref 65–99)
GLUCOSE SERPL-MCNC: 164 MG/DL (ref 65–99)
GLUCOSE SERPL-MCNC: 165 MG/DL (ref 65–99)
GLUCOSE SERPL-MCNC: 167 MG/DL (ref 65–99)
GLUCOSE SERPL-MCNC: 169 MG/DL (ref 65–99)
GLUCOSE SERPL-MCNC: 171 MG/DL (ref 65–99)
GLUCOSE SERPL-MCNC: 173 MG/DL (ref 65–99)
GLUCOSE SERPL-MCNC: 177 MG/DL (ref 65–99)
GLUCOSE SERPL-MCNC: 180 MG/DL (ref 65–99)
GLUCOSE SERPL-MCNC: 181 MG/DL (ref 65–99)
GLUCOSE SERPL-MCNC: 183 MG/DL (ref 65–99)
GLUCOSE SERPL-MCNC: 190 MG/DL (ref 65–99)
GLUCOSE SERPL-MCNC: 192 MG/DL (ref 65–99)
GLUCOSE SERPL-MCNC: 194 MG/DL (ref 65–99)
GLUCOSE SERPL-MCNC: 209 MG/DL (ref 65–99)
GLUCOSE SERPL-MCNC: 210 MG/DL (ref 65–99)
GLUCOSE SERPL-MCNC: 213 MG/DL (ref 65–99)
GLUCOSE SERPL-MCNC: 215 MG/DL (ref 65–99)
GLUCOSE SERPL-MCNC: 219 MG/DL (ref 65–99)
GLUCOSE SERPL-MCNC: 223 MG/DL (ref 65–99)
GLUCOSE SERPL-MCNC: 227 MG/DL (ref 65–99)
GLUCOSE SERPL-MCNC: 285 MG/DL (ref 65–99)
GLUCOSE SERPL-MCNC: 384 MG/DL (ref 65–99)
GLUCOSE SERPL-MCNC: 428 MG/DL (ref 65–99)
GLUCOSE SERPL-MCNC: 98 MG/DL (ref 65–99)
GLUCOSE UR STRIP.AUTO-MCNC: 100 MG/DL
GLUCOSE UR STRIP.AUTO-MCNC: NEGATIVE MG/DL
GRAN CASTS #/AREA URNS LPF: ABNORMAL /LPF
HAV IGM SERPL QL IA: NORMAL
HBA1C MFR BLD: 5.9 % (ref 4–5.6)
HBA1C MFR BLD: 8.9 % (ref 4–5.6)
HBV CORE IGM SER QL: NORMAL
HBV SURFACE AG SER QL: NORMAL
HCO3 BLDA-SCNC: 18.9 MMOL/L (ref 17–25)
HCO3 BLDA-SCNC: 20.7 MMOL/L (ref 17–25)
HCO3 BLDA-SCNC: 21.9 MMOL/L (ref 17–25)
HCT VFR BLD AUTO: 28.7 % (ref 42–52)
HCT VFR BLD AUTO: 29.1 % (ref 42–52)
HCT VFR BLD AUTO: 29.5 % (ref 42–52)
HCT VFR BLD AUTO: 29.7 % (ref 42–52)
HCT VFR BLD AUTO: 29.8 % (ref 42–52)
HCT VFR BLD AUTO: 30 % (ref 42–52)
HCT VFR BLD AUTO: 30.3 % (ref 42–52)
HCT VFR BLD AUTO: 30.4 % (ref 42–52)
HCT VFR BLD AUTO: 30.5 % (ref 42–52)
HCT VFR BLD AUTO: 31.1 % (ref 42–52)
HCT VFR BLD AUTO: 31.1 % (ref 42–52)
HCT VFR BLD AUTO: 31.5 % (ref 42–52)
HCT VFR BLD AUTO: 31.6 % (ref 42–52)
HCT VFR BLD AUTO: 31.7 % (ref 42–52)
HCT VFR BLD AUTO: 31.9 % (ref 42–52)
HCT VFR BLD AUTO: 32.1 % (ref 42–52)
HCT VFR BLD AUTO: 32.4 % (ref 42–52)
HCT VFR BLD AUTO: 32.4 % (ref 42–52)
HCT VFR BLD AUTO: 32.8 % (ref 42–52)
HCT VFR BLD AUTO: 32.8 % (ref 42–52)
HCT VFR BLD AUTO: 33.3 % (ref 42–52)
HCT VFR BLD AUTO: 33.4 % (ref 42–52)
HCT VFR BLD AUTO: 34 % (ref 42–52)
HCT VFR BLD AUTO: 34 % (ref 42–52)
HCT VFR BLD AUTO: 34.2 % (ref 42–52)
HCT VFR BLD AUTO: 34.3 % (ref 42–52)
HCT VFR BLD AUTO: 34.5 % (ref 42–52)
HCT VFR BLD AUTO: 34.5 % (ref 42–52)
HCT VFR BLD AUTO: 34.6 % (ref 42–52)
HCT VFR BLD AUTO: 34.7 % (ref 42–52)
HCT VFR BLD AUTO: 34.8 % (ref 42–52)
HCT VFR BLD AUTO: 34.8 % (ref 42–52)
HCT VFR BLD AUTO: 34.9 % (ref 42–52)
HCT VFR BLD AUTO: 34.9 % (ref 42–52)
HCT VFR BLD AUTO: 35 % (ref 42–52)
HCT VFR BLD AUTO: 35 % (ref 42–52)
HCT VFR BLD AUTO: 35.7 % (ref 42–52)
HCT VFR BLD AUTO: 35.7 % (ref 42–52)
HCT VFR BLD AUTO: 36 % (ref 42–52)
HCT VFR BLD AUTO: 36.3 % (ref 42–52)
HCT VFR BLD AUTO: 36.3 % (ref 42–52)
HCT VFR BLD AUTO: 36.4 % (ref 42–52)
HCT VFR BLD AUTO: 36.8 % (ref 42–52)
HCT VFR BLD AUTO: 37.1 % (ref 42–52)
HCT VFR BLD AUTO: 37.1 % (ref 42–52)
HCT VFR BLD AUTO: 37.3 % (ref 42–52)
HCT VFR BLD AUTO: 37.8 % (ref 42–52)
HCT VFR BLD AUTO: 37.9 % (ref 42–52)
HCT VFR BLD AUTO: 38.3 % (ref 42–52)
HCT VFR BLD AUTO: 38.3 % (ref 42–52)
HCT VFR BLD AUTO: 38.4 % (ref 42–52)
HCT VFR BLD AUTO: 38.7 % (ref 42–52)
HCT VFR BLD AUTO: 38.9 % (ref 42–52)
HCT VFR BLD AUTO: 39.5 % (ref 42–52)
HCT VFR BLD AUTO: 40.4 % (ref 42–52)
HCT VFR BLD AUTO: 40.9 % (ref 42–52)
HCT VFR BLD AUTO: 43.2 % (ref 42–52)
HCT VFR BLD AUTO: 44.8 % (ref 42–52)
HCT VFR BLD AUTO: 53.3 % (ref 42–52)
HCV AB SER QL: NORMAL
HDLC SERPL-MCNC: 27 MG/DL
HGB BLD-MCNC: 10 G/DL (ref 14–18)
HGB BLD-MCNC: 10.1 G/DL (ref 14–18)
HGB BLD-MCNC: 10.2 G/DL (ref 14–18)
HGB BLD-MCNC: 10.5 G/DL (ref 14–18)
HGB BLD-MCNC: 10.6 G/DL (ref 14–18)
HGB BLD-MCNC: 10.7 G/DL (ref 14–18)
HGB BLD-MCNC: 10.8 G/DL (ref 14–18)
HGB BLD-MCNC: 10.9 G/DL (ref 14–18)
HGB BLD-MCNC: 10.9 G/DL (ref 14–18)
HGB BLD-MCNC: 11 G/DL (ref 14–18)
HGB BLD-MCNC: 11 G/DL (ref 14–18)
HGB BLD-MCNC: 11.1 G/DL (ref 14–18)
HGB BLD-MCNC: 11.2 G/DL (ref 14–18)
HGB BLD-MCNC: 11.3 G/DL (ref 14–18)
HGB BLD-MCNC: 11.3 G/DL (ref 14–18)
HGB BLD-MCNC: 11.4 G/DL (ref 14–18)
HGB BLD-MCNC: 11.5 G/DL (ref 14–18)
HGB BLD-MCNC: 11.6 G/DL (ref 14–18)
HGB BLD-MCNC: 11.6 G/DL (ref 14–18)
HGB BLD-MCNC: 11.7 G/DL (ref 14–18)
HGB BLD-MCNC: 11.8 G/DL (ref 14–18)
HGB BLD-MCNC: 11.8 G/DL (ref 14–18)
HGB BLD-MCNC: 11.9 G/DL (ref 14–18)
HGB BLD-MCNC: 12 G/DL (ref 14–18)
HGB BLD-MCNC: 12 G/DL (ref 14–18)
HGB BLD-MCNC: 12.1 G/DL (ref 14–18)
HGB BLD-MCNC: 12.3 G/DL (ref 14–18)
HGB BLD-MCNC: 12.6 G/DL (ref 14–18)
HGB BLD-MCNC: 12.7 G/DL (ref 14–18)
HGB BLD-MCNC: 12.8 G/DL (ref 14–18)
HGB BLD-MCNC: 12.8 G/DL (ref 14–18)
HGB BLD-MCNC: 13 G/DL (ref 14–18)
HGB BLD-MCNC: 13.5 G/DL (ref 14–18)
HGB BLD-MCNC: 13.7 G/DL (ref 14–18)
HGB BLD-MCNC: 14 G/DL (ref 14–18)
HGB BLD-MCNC: 14.4 G/DL (ref 14–18)
HGB BLD-MCNC: 16.6 G/DL (ref 14–18)
HGB BLD-MCNC: 9.5 G/DL (ref 14–18)
HGB BLD-MCNC: 9.6 G/DL (ref 14–18)
HGB BLD-MCNC: 9.6 G/DL (ref 14–18)
HGB BLD-MCNC: 9.7 G/DL (ref 14–18)
HGB BLD-MCNC: 9.8 G/DL (ref 14–18)
HIV 1+2 AB+HIV1 P24 AG SERPL QL IA: NORMAL
HOROWITZ INDEX BLDA+IHG-RTO: 252 MM[HG]
HOROWITZ INDEX BLDA+IHG-RTO: 300 MM[HG]
HOROWITZ INDEX BLDA+IHG-RTO: 308 MM[HG]
HYALINE CASTS #/AREA URNS LPF: ABNORMAL /LPF
HYPOCHROMIA BLD QL SMEAR: ABNORMAL
IMM GRANULOCYTES # BLD AUTO: 0.03 K/UL (ref 0–0.11)
IMM GRANULOCYTES # BLD AUTO: 0.04 K/UL (ref 0–0.11)
IMM GRANULOCYTES # BLD AUTO: 0.04 K/UL (ref 0–0.11)
IMM GRANULOCYTES # BLD AUTO: 0.05 K/UL (ref 0–0.11)
IMM GRANULOCYTES # BLD AUTO: 0.06 K/UL (ref 0–0.11)
IMM GRANULOCYTES # BLD AUTO: 0.07 K/UL (ref 0–0.11)
IMM GRANULOCYTES # BLD AUTO: 0.08 K/UL (ref 0–0.11)
IMM GRANULOCYTES # BLD AUTO: 0.09 K/UL (ref 0–0.11)
IMM GRANULOCYTES # BLD AUTO: 0.1 K/UL (ref 0–0.11)
IMM GRANULOCYTES # BLD AUTO: 0.1 K/UL (ref 0–0.11)
IMM GRANULOCYTES # BLD AUTO: 0.11 K/UL (ref 0–0.11)
IMM GRANULOCYTES # BLD AUTO: 0.12 K/UL (ref 0–0.11)
IMM GRANULOCYTES # BLD AUTO: 0.12 K/UL (ref 0–0.11)
IMM GRANULOCYTES # BLD AUTO: 0.15 K/UL (ref 0–0.11)
IMM GRANULOCYTES # BLD AUTO: 0.16 K/UL (ref 0–0.11)
IMM GRANULOCYTES # BLD AUTO: 0.19 K/UL (ref 0–0.11)
IMM GRANULOCYTES # BLD AUTO: 0.2 K/UL (ref 0–0.11)
IMM GRANULOCYTES # BLD AUTO: 0.21 K/UL (ref 0–0.11)
IMM GRANULOCYTES # BLD AUTO: 0.22 K/UL (ref 0–0.11)
IMM GRANULOCYTES # BLD AUTO: 0.23 K/UL (ref 0–0.11)
IMM GRANULOCYTES # BLD AUTO: 0.23 K/UL (ref 0–0.11)
IMM GRANULOCYTES # BLD AUTO: 0.27 K/UL (ref 0–0.11)
IMM GRANULOCYTES # BLD AUTO: 0.28 K/UL (ref 0–0.11)
IMM GRANULOCYTES # BLD AUTO: 0.28 K/UL (ref 0–0.11)
IMM GRANULOCYTES # BLD AUTO: 0.34 K/UL (ref 0–0.11)
IMM GRANULOCYTES # BLD AUTO: 0.38 K/UL (ref 0–0.11)
IMM GRANULOCYTES # BLD AUTO: 0.51 K/UL (ref 0–0.11)
IMM GRANULOCYTES # BLD AUTO: 0.63 K/UL (ref 0–0.11)
IMM GRANULOCYTES # BLD AUTO: 0.65 K/UL (ref 0–0.11)
IMM GRANULOCYTES NFR BLD AUTO: 0.6 % (ref 0–0.9)
IMM GRANULOCYTES NFR BLD AUTO: 0.8 % (ref 0–0.9)
IMM GRANULOCYTES NFR BLD AUTO: 0.8 % (ref 0–0.9)
IMM GRANULOCYTES NFR BLD AUTO: 0.9 % (ref 0–0.9)
IMM GRANULOCYTES NFR BLD AUTO: 0.9 % (ref 0–0.9)
IMM GRANULOCYTES NFR BLD AUTO: 1 % (ref 0–0.9)
IMM GRANULOCYTES NFR BLD AUTO: 1.1 % (ref 0–0.9)
IMM GRANULOCYTES NFR BLD AUTO: 1.2 % (ref 0–0.9)
IMM GRANULOCYTES NFR BLD AUTO: 1.3 % (ref 0–0.9)
IMM GRANULOCYTES NFR BLD AUTO: 1.4 % (ref 0–0.9)
IMM GRANULOCYTES NFR BLD AUTO: 1.5 % (ref 0–0.9)
IMM GRANULOCYTES NFR BLD AUTO: 1.6 % (ref 0–0.9)
IMM GRANULOCYTES NFR BLD AUTO: 1.7 % (ref 0–0.9)
IMM GRANULOCYTES NFR BLD AUTO: 1.8 % (ref 0–0.9)
IMM GRANULOCYTES NFR BLD AUTO: 1.9 % (ref 0–0.9)
IMM GRANULOCYTES NFR BLD AUTO: 1.9 % (ref 0–0.9)
IMM GRANULOCYTES NFR BLD AUTO: 2.1 % (ref 0–0.9)
IMM GRANULOCYTES NFR BLD AUTO: 2.2 % (ref 0–0.9)
IMM GRANULOCYTES NFR BLD AUTO: 2.2 % (ref 0–0.9)
IMM GRANULOCYTES NFR BLD AUTO: 2.3 % (ref 0–0.9)
IMM GRANULOCYTES NFR BLD AUTO: 2.6 % (ref 0–0.9)
IMM GRANULOCYTES NFR BLD AUTO: 3.4 % (ref 0–0.9)
IMM GRANULOCYTES NFR BLD AUTO: 4 % (ref 0–0.9)
IMM GRANULOCYTES NFR BLD AUTO: 4 % (ref 0–0.9)
IMM GRANULOCYTES NFR BLD AUTO: 4.1 % (ref 0–0.9)
IMM GRANULOCYTES NFR BLD AUTO: 5.2 % (ref 0–0.9)
INR BLD: 1.5 (ref 0.9–1.2)
INR PPP: 1.06 (ref 0.87–1.13)
INR PPP: 1.12 (ref 0.87–1.13)
INR PPP: 1.15 (ref 0.87–1.13)
INR PPP: 1.16 (ref 0.87–1.13)
INR PPP: 1.17 (ref 0.87–1.13)
INR PPP: 1.17 (ref 0.87–1.13)
INR PPP: 1.19 (ref 0.87–1.13)
INR PPP: 1.22 (ref 0.87–1.13)
INR PPP: 1.23 (ref 0.87–1.13)
INR PPP: 1.23 (ref 0.87–1.13)
INR PPP: 1.25 (ref 0.87–1.13)
INR PPP: 1.37 (ref 0.87–1.13)
INR PPP: 1.46 (ref 0.87–1.13)
INR PPP: 1.5 (ref 2–3.5)
INR PPP: 1.57 (ref 0.87–1.13)
INR PPP: 1.64 (ref 0.87–1.13)
INR PPP: 1.92 (ref 0.87–1.13)
INR PPP: 2.2 (ref 2–3.5)
INR PPP: 2.5 (ref 2–3.5)
INR PPP: 2.74 (ref 0.87–1.13)
INR PPP: 2.84 (ref 0.87–1.13)
INR PPP: 3.02 (ref 0.87–1.13)
INR PPP: 3.04 (ref 0.87–1.13)
INR PPP: 3.1 (ref 0.87–1.13)
INR PPP: 3.1 (ref 2–3.5)
INR PPP: 3.9 (ref 2–3.5)
INR PPP: 4.2 (ref 2–3.5)
INR PPP: 4.47 (ref 0.87–1.13)
INR PPP: 5.7 (ref 0.87–1.13)
INR PPP: 5.9 (ref 2–3.5)
INST. QUALIFIED PATIENT IIQPT: YES
INTERPRETATION SERPL IFE-IMP: ABNORMAL
KETONES UR STRIP.AUTO-MCNC: 15 MG/DL
KETONES UR STRIP.AUTO-MCNC: ABNORMAL MG/DL
KETONES UR STRIP.AUTO-MCNC: NEGATIVE MG/DL
LA PPP-IMP: ABNORMAL
LACTATE BLD-SCNC: 1.6 MMOL/L (ref 0.5–2)
LACTATE BLD-SCNC: 2.2 MMOL/L (ref 0.5–2)
LACTATE BLD-SCNC: 2.7 MMOL/L (ref 0.5–2)
LACTATE BLD-SCNC: 3.5 MMOL/L (ref 0.5–2)
LACTATE BLD-SCNC: 3.6 MMOL/L (ref 0.5–2)
LACTATE BLD-SCNC: 3.7 MMOL/L (ref 0.5–2)
LACTATE BLD-SCNC: 3.8 MMOL/L (ref 0.5–2)
LACTATE BLD-SCNC: 4.8 MMOL/L (ref 0.5–2)
LACTATE BLD-SCNC: 5.9 MMOL/L (ref 0.5–2)
LACTATE BLD-SCNC: 7.3 MMOL/L (ref 0.5–2)
LDH SERPL L TO P-CCNC: 265 U/L (ref 107–266)
LDLC SERPL CALC-MCNC: 77 MG/DL
LEUKOCYTE ESTERASE UR QL STRIP.AUTO: ABNORMAL
LEUKOCYTE ESTERASE UR QL STRIP.AUTO: ABNORMAL
LEUKOCYTE ESTERASE UR QL STRIP.AUTO: NEGATIVE
LV EJECT FRACT  99904: 45
LV EJECT FRACT MOD 2C 99903: 54.25
LV EJECT FRACT MOD 4C 99902: 48.16
LV EJECT FRACT MOD BP 99901: 54.79
LYMPHOCYTES # BLD AUTO: 0.22 K/UL (ref 1–4.8)
LYMPHOCYTES # BLD AUTO: 0.26 K/UL (ref 1–4.8)
LYMPHOCYTES # BLD AUTO: 0.27 K/UL (ref 1–4.8)
LYMPHOCYTES # BLD AUTO: 0.27 K/UL (ref 1–4.8)
LYMPHOCYTES # BLD AUTO: 0.29 K/UL (ref 1–4.8)
LYMPHOCYTES # BLD AUTO: 0.32 K/UL (ref 1–4.8)
LYMPHOCYTES # BLD AUTO: 0.33 K/UL (ref 1–4.8)
LYMPHOCYTES # BLD AUTO: 0.33 K/UL (ref 1–4.8)
LYMPHOCYTES # BLD AUTO: 0.35 K/UL (ref 1–4.8)
LYMPHOCYTES # BLD AUTO: 0.36 K/UL (ref 1–4.8)
LYMPHOCYTES # BLD AUTO: 0.38 K/UL (ref 1–4.8)
LYMPHOCYTES # BLD AUTO: 0.39 K/UL (ref 1–4.8)
LYMPHOCYTES # BLD AUTO: 0.4 K/UL (ref 1–4.8)
LYMPHOCYTES # BLD AUTO: 0.42 K/UL (ref 1–4.8)
LYMPHOCYTES # BLD AUTO: 0.43 K/UL (ref 1–4.8)
LYMPHOCYTES # BLD AUTO: 0.44 K/UL (ref 1–4.8)
LYMPHOCYTES # BLD AUTO: 0.45 K/UL (ref 1–4.8)
LYMPHOCYTES # BLD AUTO: 0.47 K/UL (ref 1–4.8)
LYMPHOCYTES # BLD AUTO: 0.48 K/UL (ref 1–4.8)
LYMPHOCYTES # BLD AUTO: 0.51 K/UL (ref 1–4.8)
LYMPHOCYTES # BLD AUTO: 0.51 K/UL (ref 1–4.8)
LYMPHOCYTES # BLD AUTO: 0.52 K/UL (ref 1–4.8)
LYMPHOCYTES # BLD AUTO: 0.53 K/UL (ref 1–4.8)
LYMPHOCYTES # BLD AUTO: 0.53 K/UL (ref 1–4.8)
LYMPHOCYTES # BLD AUTO: 0.54 K/UL (ref 1–4.8)
LYMPHOCYTES # BLD AUTO: 0.54 K/UL (ref 1–4.8)
LYMPHOCYTES # BLD AUTO: 0.55 K/UL (ref 1–4.8)
LYMPHOCYTES # BLD AUTO: 0.56 K/UL (ref 1–4.8)
LYMPHOCYTES # BLD AUTO: 0.58 K/UL (ref 1–4.8)
LYMPHOCYTES # BLD AUTO: 0.59 K/UL (ref 1–4.8)
LYMPHOCYTES # BLD AUTO: 0.59 K/UL (ref 1–4.8)
LYMPHOCYTES # BLD AUTO: 0.6 K/UL (ref 1–4.8)
LYMPHOCYTES # BLD AUTO: 0.63 K/UL (ref 1–4.8)
LYMPHOCYTES # BLD AUTO: 0.64 K/UL (ref 1–4.8)
LYMPHOCYTES # BLD AUTO: 0.69 K/UL (ref 1–4.8)
LYMPHOCYTES # BLD AUTO: 0.7 K/UL (ref 1–4.8)
LYMPHOCYTES # BLD AUTO: 0.7 K/UL (ref 1–4.8)
LYMPHOCYTES # BLD AUTO: 0.73 K/UL (ref 1–4.8)
LYMPHOCYTES # BLD AUTO: 0.77 K/UL (ref 1–4.8)
LYMPHOCYTES # BLD AUTO: 0.78 K/UL (ref 1–4.8)
LYMPHOCYTES # BLD AUTO: 0.79 K/UL (ref 1–4.8)
LYMPHOCYTES # BLD AUTO: 0.79 K/UL (ref 1–4.8)
LYMPHOCYTES # BLD AUTO: 0.83 K/UL (ref 1–4.8)
LYMPHOCYTES # BLD AUTO: 0.86 K/UL (ref 1–4.8)
LYMPHOCYTES # BLD AUTO: 0.86 K/UL (ref 1–4.8)
LYMPHOCYTES # BLD AUTO: 0.9 K/UL (ref 1–4.8)
LYMPHOCYTES # BLD AUTO: 0.95 K/UL (ref 1–4.8)
LYMPHOCYTES # BLD AUTO: 0.98 K/UL (ref 1–4.8)
LYMPHOCYTES # BLD AUTO: 0.99 K/UL (ref 1–4.8)
LYMPHOCYTES # BLD AUTO: 1.02 K/UL (ref 1–4.8)
LYMPHOCYTES # BLD AUTO: 1.06 K/UL (ref 1–4.8)
LYMPHOCYTES NFR BLD: 1.8 % (ref 22–41)
LYMPHOCYTES NFR BLD: 10.2 % (ref 22–41)
LYMPHOCYTES NFR BLD: 10.4 % (ref 22–41)
LYMPHOCYTES NFR BLD: 10.8 % (ref 22–41)
LYMPHOCYTES NFR BLD: 11.2 % (ref 22–41)
LYMPHOCYTES NFR BLD: 11.6 % (ref 22–41)
LYMPHOCYTES NFR BLD: 11.7 % (ref 22–41)
LYMPHOCYTES NFR BLD: 12.1 % (ref 22–41)
LYMPHOCYTES NFR BLD: 13.5 % (ref 22–41)
LYMPHOCYTES NFR BLD: 14.3 % (ref 22–41)
LYMPHOCYTES NFR BLD: 14.3 % (ref 22–41)
LYMPHOCYTES NFR BLD: 15.2 % (ref 22–41)
LYMPHOCYTES NFR BLD: 16 % (ref 22–41)
LYMPHOCYTES NFR BLD: 16 % (ref 22–41)
LYMPHOCYTES NFR BLD: 16.6 % (ref 22–41)
LYMPHOCYTES NFR BLD: 17.9 % (ref 22–41)
LYMPHOCYTES NFR BLD: 2.1 % (ref 22–41)
LYMPHOCYTES NFR BLD: 2.1 % (ref 22–41)
LYMPHOCYTES NFR BLD: 2.6 % (ref 22–41)
LYMPHOCYTES NFR BLD: 3.1 % (ref 22–41)
LYMPHOCYTES NFR BLD: 3.2 % (ref 22–41)
LYMPHOCYTES NFR BLD: 3.3 % (ref 22–41)
LYMPHOCYTES NFR BLD: 4.2 % (ref 22–41)
LYMPHOCYTES NFR BLD: 4.3 % (ref 22–41)
LYMPHOCYTES NFR BLD: 4.3 % (ref 22–41)
LYMPHOCYTES NFR BLD: 4.4 % (ref 22–41)
LYMPHOCYTES NFR BLD: 4.4 % (ref 22–41)
LYMPHOCYTES NFR BLD: 4.5 % (ref 22–41)
LYMPHOCYTES NFR BLD: 4.6 % (ref 22–41)
LYMPHOCYTES NFR BLD: 4.7 % (ref 22–41)
LYMPHOCYTES NFR BLD: 4.7 % (ref 22–41)
LYMPHOCYTES NFR BLD: 4.8 % (ref 22–41)
LYMPHOCYTES NFR BLD: 5.1 % (ref 22–41)
LYMPHOCYTES NFR BLD: 5.2 % (ref 22–41)
LYMPHOCYTES NFR BLD: 5.4 % (ref 22–41)
LYMPHOCYTES NFR BLD: 5.7 % (ref 22–41)
LYMPHOCYTES NFR BLD: 5.8 % (ref 22–41)
LYMPHOCYTES NFR BLD: 5.9 % (ref 22–41)
LYMPHOCYTES NFR BLD: 6.1 % (ref 22–41)
LYMPHOCYTES NFR BLD: 6.2 % (ref 22–41)
LYMPHOCYTES NFR BLD: 6.2 % (ref 22–41)
LYMPHOCYTES NFR BLD: 6.3 % (ref 22–41)
LYMPHOCYTES NFR BLD: 6.4 % (ref 22–41)
LYMPHOCYTES NFR BLD: 6.6 % (ref 22–41)
LYMPHOCYTES NFR BLD: 6.7 % (ref 22–41)
LYMPHOCYTES NFR BLD: 6.7 % (ref 22–41)
LYMPHOCYTES NFR BLD: 6.8 % (ref 22–41)
LYMPHOCYTES NFR BLD: 7 % (ref 22–41)
LYMPHOCYTES NFR BLD: 7.5 % (ref 22–41)
LYMPHOCYTES NFR BLD: 7.7 % (ref 22–41)
LYMPHOCYTES NFR BLD: 8.1 % (ref 22–41)
LYMPHOCYTES NFR BLD: 8.5 % (ref 22–41)
LYMPHOCYTES NFR BLD: 8.7 % (ref 22–41)
LYMPHOCYTES NFR BLD: 8.8 % (ref 22–41)
LYMPHOCYTES NFR BLD: 9 % (ref 22–41)
LYMPHOCYTES NFR BLD: 9.4 % (ref 22–41)
LYMPHOCYTES NFR BLD: 9.6 % (ref 22–41)
LYMPHOCYTES NFR BLD: 9.9 % (ref 22–41)
MACROCYTES BLD QL SMEAR: ABNORMAL
MAGNESIUM SERPL-MCNC: 1.6 MG/DL (ref 1.5–2.5)
MAGNESIUM SERPL-MCNC: 1.7 MG/DL (ref 1.5–2.5)
MAGNESIUM SERPL-MCNC: 1.8 MG/DL (ref 1.5–2.5)
MAGNESIUM SERPL-MCNC: 1.9 MG/DL (ref 1.5–2.5)
MAGNESIUM SERPL-MCNC: 2 MG/DL (ref 1.5–2.5)
MAGNESIUM SERPL-MCNC: 2.1 MG/DL (ref 1.5–2.5)
MAGNESIUM SERPL-MCNC: 2.1 MG/DL (ref 1.5–2.5)
MAGNESIUM SERPL-MCNC: 2.3 MG/DL (ref 1.5–2.5)
MAGNESIUM SERPL-MCNC: 2.3 MG/DL (ref 1.5–2.5)
MAGNESIUM SERPL-MCNC: 2.4 MG/DL (ref 1.5–2.5)
MAGNESIUM SERPL-MCNC: 2.7 MG/DL (ref 1.5–2.5)
MAGNESIUM SERPL-MCNC: 2.7 MG/DL (ref 1.5–2.5)
MAGNESIUM SERPL-MCNC: 2.8 MG/DL (ref 1.5–2.5)
MANUAL DIFF BLD: ABNORMAL
MANUAL DIFF BLD: NORMAL
MCF BLD TEG: 65 MM (ref 50–70)
MCF P HPASE BLD TEG: 66.8 MM (ref 50–70)
MCH RBC QN AUTO: 28.1 PG (ref 27–33)
MCH RBC QN AUTO: 28.6 PG (ref 27–33)
MCH RBC QN AUTO: 28.8 PG (ref 27–33)
MCH RBC QN AUTO: 29.2 PG (ref 27–33)
MCH RBC QN AUTO: 31.6 PG (ref 27–33)
MCH RBC QN AUTO: 31.6 PG (ref 27–33)
MCH RBC QN AUTO: 31.7 PG (ref 27–33)
MCH RBC QN AUTO: 31.7 PG (ref 27–33)
MCH RBC QN AUTO: 31.9 PG (ref 27–33)
MCH RBC QN AUTO: 32 PG (ref 27–33)
MCH RBC QN AUTO: 32 PG (ref 27–33)
MCH RBC QN AUTO: 32.1 PG (ref 27–33)
MCH RBC QN AUTO: 32.1 PG (ref 27–33)
MCH RBC QN AUTO: 32.2 PG (ref 27–33)
MCH RBC QN AUTO: 32.3 PG (ref 27–33)
MCH RBC QN AUTO: 32.4 PG (ref 27–33)
MCH RBC QN AUTO: 32.4 PG (ref 27–33)
MCH RBC QN AUTO: 32.5 PG (ref 27–33)
MCH RBC QN AUTO: 32.6 PG (ref 27–33)
MCH RBC QN AUTO: 32.7 PG (ref 27–33)
MCH RBC QN AUTO: 32.8 PG (ref 27–33)
MCH RBC QN AUTO: 32.9 PG (ref 27–33)
MCH RBC QN AUTO: 33 PG (ref 27–33)
MCH RBC QN AUTO: 33.1 PG (ref 27–33)
MCH RBC QN AUTO: 33.1 PG (ref 27–33)
MCH RBC QN AUTO: 33.2 PG (ref 27–33)
MCH RBC QN AUTO: 33.4 PG (ref 27–33)
MCHC RBC AUTO-ENTMCNC: 29.8 G/DL (ref 33.7–35.3)
MCHC RBC AUTO-ENTMCNC: 31.1 G/DL (ref 33.7–35.3)
MCHC RBC AUTO-ENTMCNC: 31.3 G/DL (ref 33.7–35.3)
MCHC RBC AUTO-ENTMCNC: 31.3 G/DL (ref 33.7–35.3)
MCHC RBC AUTO-ENTMCNC: 31.4 G/DL (ref 33.7–35.3)
MCHC RBC AUTO-ENTMCNC: 31.4 G/DL (ref 33.7–35.3)
MCHC RBC AUTO-ENTMCNC: 31.9 G/DL (ref 33.7–35.3)
MCHC RBC AUTO-ENTMCNC: 32 G/DL (ref 33.7–35.3)
MCHC RBC AUTO-ENTMCNC: 32 G/DL (ref 33.7–35.3)
MCHC RBC AUTO-ENTMCNC: 32.2 G/DL (ref 33.7–35.3)
MCHC RBC AUTO-ENTMCNC: 32.3 G/DL (ref 33.7–35.3)
MCHC RBC AUTO-ENTMCNC: 32.3 G/DL (ref 33.7–35.3)
MCHC RBC AUTO-ENTMCNC: 32.4 G/DL (ref 33.7–35.3)
MCHC RBC AUTO-ENTMCNC: 32.4 G/DL (ref 33.7–35.3)
MCHC RBC AUTO-ENTMCNC: 32.5 G/DL (ref 33.7–35.3)
MCHC RBC AUTO-ENTMCNC: 32.6 G/DL (ref 33.7–35.3)
MCHC RBC AUTO-ENTMCNC: 32.8 G/DL (ref 33.7–35.3)
MCHC RBC AUTO-ENTMCNC: 32.9 G/DL (ref 33.7–35.3)
MCHC RBC AUTO-ENTMCNC: 33 G/DL (ref 33.7–35.3)
MCHC RBC AUTO-ENTMCNC: 33.1 G/DL (ref 33.7–35.3)
MCHC RBC AUTO-ENTMCNC: 33.2 G/DL (ref 33.7–35.3)
MCHC RBC AUTO-ENTMCNC: 33.3 G/DL (ref 33.7–35.3)
MCHC RBC AUTO-ENTMCNC: 33.4 G/DL (ref 33.7–35.3)
MCHC RBC AUTO-ENTMCNC: 33.4 G/DL (ref 33.7–35.3)
MCHC RBC AUTO-ENTMCNC: 33.5 G/DL (ref 33.7–35.3)
MCHC RBC AUTO-ENTMCNC: 33.6 G/DL (ref 33.7–35.3)
MCHC RBC AUTO-ENTMCNC: 33.8 G/DL (ref 33.7–35.3)
MCHC RBC AUTO-ENTMCNC: 33.9 G/DL (ref 33.7–35.3)
MCHC RBC AUTO-ENTMCNC: 34 G/DL (ref 33.7–35.3)
MCHC RBC AUTO-ENTMCNC: 34.6 G/DL (ref 33.7–35.3)
MCV RBC AUTO: 100 FL (ref 81.4–97.8)
MCV RBC AUTO: 100.3 FL (ref 81.4–97.8)
MCV RBC AUTO: 100.9 FL (ref 81.4–97.8)
MCV RBC AUTO: 101 FL (ref 81.4–97.8)
MCV RBC AUTO: 101.1 FL (ref 81.4–97.8)
MCV RBC AUTO: 101.2 FL (ref 81.4–97.8)
MCV RBC AUTO: 101.3 FL (ref 81.4–97.8)
MCV RBC AUTO: 101.8 FL (ref 81.4–97.8)
MCV RBC AUTO: 102.2 FL (ref 81.4–97.8)
MCV RBC AUTO: 102.5 FL (ref 81.4–97.8)
MCV RBC AUTO: 102.6 FL (ref 81.4–97.8)
MCV RBC AUTO: 91.7 FL (ref 81.4–97.8)
MCV RBC AUTO: 92.2 FL (ref 81.4–97.8)
MCV RBC AUTO: 93.5 FL (ref 81.4–97.8)
MCV RBC AUTO: 94.6 FL (ref 81.4–97.8)
MCV RBC AUTO: 95.5 FL (ref 81.4–97.8)
MCV RBC AUTO: 95.6 FL (ref 81.4–97.8)
MCV RBC AUTO: 96.2 FL (ref 81.4–97.8)
MCV RBC AUTO: 96.3 FL (ref 81.4–97.8)
MCV RBC AUTO: 96.7 FL (ref 81.4–97.8)
MCV RBC AUTO: 97 FL (ref 81.4–97.8)
MCV RBC AUTO: 97 FL (ref 81.4–97.8)
MCV RBC AUTO: 97.1 FL (ref 81.4–97.8)
MCV RBC AUTO: 97.1 FL (ref 81.4–97.8)
MCV RBC AUTO: 97.3 FL (ref 81.4–97.8)
MCV RBC AUTO: 97.4 FL (ref 81.4–97.8)
MCV RBC AUTO: 97.5 FL (ref 81.4–97.8)
MCV RBC AUTO: 97.6 FL (ref 81.4–97.8)
MCV RBC AUTO: 97.6 FL (ref 81.4–97.8)
MCV RBC AUTO: 98 FL (ref 81.4–97.8)
MCV RBC AUTO: 98.1 FL (ref 81.4–97.8)
MCV RBC AUTO: 98.1 FL (ref 81.4–97.8)
MCV RBC AUTO: 98.2 FL (ref 81.4–97.8)
MCV RBC AUTO: 98.3 FL (ref 81.4–97.8)
MCV RBC AUTO: 98.3 FL (ref 81.4–97.8)
MCV RBC AUTO: 98.4 FL (ref 81.4–97.8)
MCV RBC AUTO: 98.5 FL (ref 81.4–97.8)
MCV RBC AUTO: 98.5 FL (ref 81.4–97.8)
MCV RBC AUTO: 98.6 FL (ref 81.4–97.8)
MCV RBC AUTO: 98.7 FL (ref 81.4–97.8)
MCV RBC AUTO: 98.9 FL (ref 81.4–97.8)
MCV RBC AUTO: 99 FL (ref 81.4–97.8)
MCV RBC AUTO: 99.1 FL (ref 81.4–97.8)
MCV RBC AUTO: 99.4 FL (ref 81.4–97.8)
MCV RBC AUTO: 99.7 FL (ref 81.4–97.8)
METAMYELOCYTES NFR BLD MANUAL: 0.9 %
METAMYELOCYTES NFR BLD MANUAL: 1.8 %
METAMYELOCYTES NFR BLD MANUAL: 2.6 %
METAMYELOCYTES NFR BLD MANUAL: 2.6 %
METAMYELOCYTES NFR BLD MANUAL: 3.5 %
MICRO URNS: ABNORMAL
MICRO URNS: NORMAL
MICROCYTES BLD QL SMEAR: ABNORMAL
MONOCLON BAND OBS SERPL: ABNORMAL
MONOCYTES # BLD AUTO: 0.13 K/UL (ref 0–0.85)
MONOCYTES # BLD AUTO: 0.16 K/UL (ref 0–0.85)
MONOCYTES # BLD AUTO: 0.17 K/UL (ref 0–0.85)
MONOCYTES # BLD AUTO: 0.18 K/UL (ref 0–0.85)
MONOCYTES # BLD AUTO: 0.18 K/UL (ref 0–0.85)
MONOCYTES # BLD AUTO: 0.19 K/UL (ref 0–0.85)
MONOCYTES # BLD AUTO: 0.21 K/UL (ref 0–0.85)
MONOCYTES # BLD AUTO: 0.22 K/UL (ref 0–0.85)
MONOCYTES # BLD AUTO: 0.23 K/UL (ref 0–0.85)
MONOCYTES # BLD AUTO: 0.25 K/UL (ref 0–0.85)
MONOCYTES # BLD AUTO: 0.25 K/UL (ref 0–0.85)
MONOCYTES # BLD AUTO: 0.26 K/UL (ref 0–0.85)
MONOCYTES # BLD AUTO: 0.27 K/UL (ref 0–0.85)
MONOCYTES # BLD AUTO: 0.28 K/UL (ref 0–0.85)
MONOCYTES # BLD AUTO: 0.29 K/UL (ref 0–0.85)
MONOCYTES # BLD AUTO: 0.3 K/UL (ref 0–0.85)
MONOCYTES # BLD AUTO: 0.3 K/UL (ref 0–0.85)
MONOCYTES # BLD AUTO: 0.31 K/UL (ref 0–0.85)
MONOCYTES # BLD AUTO: 0.32 K/UL (ref 0–0.85)
MONOCYTES # BLD AUTO: 0.32 K/UL (ref 0–0.85)
MONOCYTES # BLD AUTO: 0.34 K/UL (ref 0–0.85)
MONOCYTES # BLD AUTO: 0.35 K/UL (ref 0–0.85)
MONOCYTES # BLD AUTO: 0.35 K/UL (ref 0–0.85)
MONOCYTES # BLD AUTO: 0.37 K/UL (ref 0–0.85)
MONOCYTES # BLD AUTO: 0.38 K/UL (ref 0–0.85)
MONOCYTES # BLD AUTO: 0.39 K/UL (ref 0–0.85)
MONOCYTES # BLD AUTO: 0.4 K/UL (ref 0–0.85)
MONOCYTES # BLD AUTO: 0.41 K/UL (ref 0–0.85)
MONOCYTES # BLD AUTO: 0.43 K/UL (ref 0–0.85)
MONOCYTES # BLD AUTO: 0.43 K/UL (ref 0–0.85)
MONOCYTES # BLD AUTO: 0.44 K/UL (ref 0–0.85)
MONOCYTES # BLD AUTO: 0.45 K/UL (ref 0–0.85)
MONOCYTES # BLD AUTO: 0.46 K/UL (ref 0–0.85)
MONOCYTES # BLD AUTO: 0.46 K/UL (ref 0–0.85)
MONOCYTES # BLD AUTO: 0.47 K/UL (ref 0–0.85)
MONOCYTES # BLD AUTO: 0.48 K/UL (ref 0–0.85)
MONOCYTES # BLD AUTO: 0.49 K/UL (ref 0–0.85)
MONOCYTES # BLD AUTO: 0.49 K/UL (ref 0–0.85)
MONOCYTES # BLD AUTO: 0.52 K/UL (ref 0–0.85)
MONOCYTES # BLD AUTO: 0.53 K/UL (ref 0–0.85)
MONOCYTES # BLD AUTO: 0.56 K/UL (ref 0–0.85)
MONOCYTES # BLD AUTO: 0.58 K/UL (ref 0–0.85)
MONOCYTES # BLD AUTO: 0.59 K/UL (ref 0–0.85)
MONOCYTES # BLD AUTO: 0.6 K/UL (ref 0–0.85)
MONOCYTES # BLD AUTO: 0.66 K/UL (ref 0–0.85)
MONOCYTES # BLD AUTO: 0.67 K/UL (ref 0–0.85)
MONOCYTES # BLD AUTO: 0.68 K/UL (ref 0–0.85)
MONOCYTES # BLD AUTO: 0.69 K/UL (ref 0–0.85)
MONOCYTES # BLD AUTO: 0.72 K/UL (ref 0–0.85)
MONOCYTES # BLD AUTO: 0.72 K/UL (ref 0–0.85)
MONOCYTES # BLD AUTO: 0.73 K/UL (ref 0–0.85)
MONOCYTES # BLD AUTO: 0.89 K/UL (ref 0–0.85)
MONOCYTES # BLD AUTO: 0.92 K/UL (ref 0–0.85)
MONOCYTES # BLD AUTO: 1.09 K/UL (ref 0–0.85)
MONOCYTES NFR BLD AUTO: 1.7 % (ref 0–13.4)
MONOCYTES NFR BLD AUTO: 1.8 % (ref 0–13.4)
MONOCYTES NFR BLD AUTO: 10 % (ref 0–13.4)
MONOCYTES NFR BLD AUTO: 2.6 % (ref 0–13.4)
MONOCYTES NFR BLD AUTO: 2.8 % (ref 0–13.4)
MONOCYTES NFR BLD AUTO: 3 % (ref 0–13.4)
MONOCYTES NFR BLD AUTO: 3.1 % (ref 0–13.4)
MONOCYTES NFR BLD AUTO: 3.3 % (ref 0–13.4)
MONOCYTES NFR BLD AUTO: 3.4 % (ref 0–13.4)
MONOCYTES NFR BLD AUTO: 3.5 % (ref 0–13.4)
MONOCYTES NFR BLD AUTO: 3.5 % (ref 0–13.4)
MONOCYTES NFR BLD AUTO: 3.6 % (ref 0–13.4)
MONOCYTES NFR BLD AUTO: 3.6 % (ref 0–13.4)
MONOCYTES NFR BLD AUTO: 3.7 % (ref 0–13.4)
MONOCYTES NFR BLD AUTO: 4 % (ref 0–13.4)
MONOCYTES NFR BLD AUTO: 4.1 % (ref 0–13.4)
MONOCYTES NFR BLD AUTO: 4.1 % (ref 0–13.4)
MONOCYTES NFR BLD AUTO: 4.2 % (ref 0–13.4)
MONOCYTES NFR BLD AUTO: 4.2 % (ref 0–13.4)
MONOCYTES NFR BLD AUTO: 4.3 % (ref 0–13.4)
MONOCYTES NFR BLD AUTO: 4.3 % (ref 0–13.4)
MONOCYTES NFR BLD AUTO: 4.4 % (ref 0–13.4)
MONOCYTES NFR BLD AUTO: 4.4 % (ref 0–13.4)
MONOCYTES NFR BLD AUTO: 4.6 % (ref 0–13.4)
MONOCYTES NFR BLD AUTO: 4.7 % (ref 0–13.4)
MONOCYTES NFR BLD AUTO: 4.8 % (ref 0–13.4)
MONOCYTES NFR BLD AUTO: 4.9 % (ref 0–13.4)
MONOCYTES NFR BLD AUTO: 5 % (ref 0–13.4)
MONOCYTES NFR BLD AUTO: 5.2 % (ref 0–13.4)
MONOCYTES NFR BLD AUTO: 5.3 % (ref 0–13.4)
MONOCYTES NFR BLD AUTO: 5.3 % (ref 0–13.4)
MONOCYTES NFR BLD AUTO: 5.6 % (ref 0–13.4)
MONOCYTES NFR BLD AUTO: 5.6 % (ref 0–13.4)
MONOCYTES NFR BLD AUTO: 5.9 % (ref 0–13.4)
MONOCYTES NFR BLD AUTO: 6 % (ref 0–13.4)
MONOCYTES NFR BLD AUTO: 6.2 % (ref 0–13.4)
MONOCYTES NFR BLD AUTO: 6.3 % (ref 0–13.4)
MONOCYTES NFR BLD AUTO: 6.3 % (ref 0–13.4)
MONOCYTES NFR BLD AUTO: 6.5 % (ref 0–13.4)
MONOCYTES NFR BLD AUTO: 6.6 % (ref 0–13.4)
MONOCYTES NFR BLD AUTO: 6.9 % (ref 0–13.4)
MONOCYTES NFR BLD AUTO: 7.2 % (ref 0–13.4)
MONOCYTES NFR BLD AUTO: 7.2 % (ref 0–13.4)
MONOCYTES NFR BLD AUTO: 7.4 % (ref 0–13.4)
MONOCYTES NFR BLD AUTO: 7.4 % (ref 0–13.4)
MONOCYTES NFR BLD AUTO: 7.7 % (ref 0–13.4)
MONOCYTES NFR BLD AUTO: 8.2 % (ref 0–13.4)
MONOCYTES NFR BLD AUTO: 8.6 % (ref 0–13.4)
MONOCYTES NFR BLD AUTO: 9.1 % (ref 0–13.4)
MONOCYTES NFR BLD AUTO: 9.3 % (ref 0–13.4)
MONOCYTES NFR BLD AUTO: 9.7 % (ref 0–13.4)
MORPHOLOGY BLD-IMP: NORMAL
MRSA DNA SPEC QL NAA+PROBE: NORMAL
MRSA DNA SPEC QL NAA+PROBE: NORMAL
MYELOCYTES NFR BLD MANUAL: 0.9 %
MYELOCYTES NFR BLD MANUAL: 0.9 %
MYELOCYTES NFR BLD MANUAL: 1.7 %
MYELOCYTES NFR BLD MANUAL: 1.8 %
NEUTROPHILS # BLD AUTO: 10.32 K/UL (ref 1.82–7.42)
NEUTROPHILS # BLD AUTO: 10.74 K/UL (ref 1.82–7.42)
NEUTROPHILS # BLD AUTO: 11.17 K/UL (ref 1.82–7.42)
NEUTROPHILS # BLD AUTO: 11.61 K/UL (ref 1.82–7.42)
NEUTROPHILS # BLD AUTO: 11.8 K/UL (ref 1.82–7.42)
NEUTROPHILS # BLD AUTO: 12.59 K/UL (ref 1.82–7.42)
NEUTROPHILS # BLD AUTO: 12.77 K/UL (ref 1.82–7.42)
NEUTROPHILS # BLD AUTO: 12.88 K/UL (ref 1.82–7.42)
NEUTROPHILS # BLD AUTO: 12.96 K/UL (ref 1.82–7.42)
NEUTROPHILS # BLD AUTO: 13.53 K/UL (ref 1.82–7.42)
NEUTROPHILS # BLD AUTO: 13.67 K/UL (ref 1.82–7.42)
NEUTROPHILS # BLD AUTO: 13.72 K/UL (ref 1.82–7.42)
NEUTROPHILS # BLD AUTO: 13.82 K/UL (ref 1.82–7.42)
NEUTROPHILS # BLD AUTO: 14.23 K/UL (ref 1.82–7.42)
NEUTROPHILS # BLD AUTO: 14.64 K/UL (ref 1.82–7.42)
NEUTROPHILS # BLD AUTO: 2.73 K/UL (ref 1.82–7.42)
NEUTROPHILS # BLD AUTO: 2.79 K/UL (ref 1.82–7.42)
NEUTROPHILS # BLD AUTO: 2.8 K/UL (ref 1.82–7.42)
NEUTROPHILS # BLD AUTO: 2.83 K/UL (ref 1.82–7.42)
NEUTROPHILS # BLD AUTO: 2.91 K/UL (ref 1.82–7.42)
NEUTROPHILS # BLD AUTO: 24.68 K/UL (ref 1.82–7.42)
NEUTROPHILS # BLD AUTO: 26.7 K/UL (ref 1.82–7.42)
NEUTROPHILS # BLD AUTO: 3.28 K/UL (ref 1.82–7.42)
NEUTROPHILS # BLD AUTO: 3.51 K/UL (ref 1.82–7.42)
NEUTROPHILS # BLD AUTO: 3.59 K/UL (ref 1.82–7.42)
NEUTROPHILS # BLD AUTO: 3.6 K/UL (ref 1.82–7.42)
NEUTROPHILS # BLD AUTO: 3.74 K/UL (ref 1.82–7.42)
NEUTROPHILS # BLD AUTO: 3.78 K/UL (ref 1.82–7.42)
NEUTROPHILS # BLD AUTO: 3.84 K/UL (ref 1.82–7.42)
NEUTROPHILS # BLD AUTO: 3.89 K/UL (ref 1.82–7.42)
NEUTROPHILS # BLD AUTO: 4.07 K/UL (ref 1.82–7.42)
NEUTROPHILS # BLD AUTO: 4.19 K/UL (ref 1.82–7.42)
NEUTROPHILS # BLD AUTO: 4.29 K/UL (ref 1.82–7.42)
NEUTROPHILS # BLD AUTO: 4.33 K/UL (ref 1.82–7.42)
NEUTROPHILS # BLD AUTO: 4.33 K/UL (ref 1.82–7.42)
NEUTROPHILS # BLD AUTO: 4.52 K/UL (ref 1.82–7.42)
NEUTROPHILS # BLD AUTO: 4.53 K/UL (ref 1.82–7.42)
NEUTROPHILS # BLD AUTO: 4.55 K/UL (ref 1.82–7.42)
NEUTROPHILS # BLD AUTO: 4.65 K/UL (ref 1.82–7.42)
NEUTROPHILS # BLD AUTO: 5.05 K/UL (ref 1.82–7.42)
NEUTROPHILS # BLD AUTO: 5.19 K/UL (ref 1.82–7.42)
NEUTROPHILS # BLD AUTO: 5.23 K/UL (ref 1.82–7.42)
NEUTROPHILS # BLD AUTO: 5.38 K/UL (ref 1.82–7.42)
NEUTROPHILS # BLD AUTO: 5.49 K/UL (ref 1.82–7.42)
NEUTROPHILS # BLD AUTO: 5.6 K/UL (ref 1.82–7.42)
NEUTROPHILS # BLD AUTO: 5.65 K/UL (ref 1.82–7.42)
NEUTROPHILS # BLD AUTO: 5.93 K/UL (ref 1.82–7.42)
NEUTROPHILS # BLD AUTO: 6.01 K/UL (ref 1.82–7.42)
NEUTROPHILS # BLD AUTO: 6.1 K/UL (ref 1.82–7.42)
NEUTROPHILS # BLD AUTO: 6.47 K/UL (ref 1.82–7.42)
NEUTROPHILS # BLD AUTO: 6.81 K/UL (ref 1.82–7.42)
NEUTROPHILS # BLD AUTO: 6.92 K/UL (ref 1.82–7.42)
NEUTROPHILS # BLD AUTO: 7.19 K/UL (ref 1.82–7.42)
NEUTROPHILS # BLD AUTO: 7.49 K/UL (ref 1.82–7.42)
NEUTROPHILS # BLD AUTO: 8.24 K/UL (ref 1.82–7.42)
NEUTROPHILS # BLD AUTO: 9.15 K/UL (ref 1.82–7.42)
NEUTROPHILS # BLD AUTO: 9.33 K/UL (ref 1.82–7.42)
NEUTROPHILS # BLD AUTO: 9.53 K/UL (ref 1.82–7.42)
NEUTROPHILS # BLD AUTO: 9.54 K/UL (ref 1.82–7.42)
NEUTROPHILS # BLD AUTO: 9.77 K/UL (ref 1.82–7.42)
NEUTROPHILS NFR BLD: 69.6 % (ref 44–72)
NEUTROPHILS NFR BLD: 71.2 % (ref 44–72)
NEUTROPHILS NFR BLD: 72.5 % (ref 44–72)
NEUTROPHILS NFR BLD: 72.6 % (ref 44–72)
NEUTROPHILS NFR BLD: 72.7 % (ref 44–72)
NEUTROPHILS NFR BLD: 73.9 % (ref 44–72)
NEUTROPHILS NFR BLD: 75.6 % (ref 44–72)
NEUTROPHILS NFR BLD: 76.2 % (ref 44–72)
NEUTROPHILS NFR BLD: 76.9 % (ref 44–72)
NEUTROPHILS NFR BLD: 77.1 % (ref 44–72)
NEUTROPHILS NFR BLD: 77.5 % (ref 44–72)
NEUTROPHILS NFR BLD: 77.7 % (ref 44–72)
NEUTROPHILS NFR BLD: 77.7 % (ref 44–72)
NEUTROPHILS NFR BLD: 77.9 % (ref 44–72)
NEUTROPHILS NFR BLD: 79.1 % (ref 44–72)
NEUTROPHILS NFR BLD: 79.3 % (ref 44–72)
NEUTROPHILS NFR BLD: 81.7 % (ref 44–72)
NEUTROPHILS NFR BLD: 82 % (ref 44–72)
NEUTROPHILS NFR BLD: 82.1 % (ref 44–72)
NEUTROPHILS NFR BLD: 82.6 % (ref 44–72)
NEUTROPHILS NFR BLD: 83.1 % (ref 44–72)
NEUTROPHILS NFR BLD: 83.2 % (ref 44–72)
NEUTROPHILS NFR BLD: 83.5 % (ref 44–72)
NEUTROPHILS NFR BLD: 84.5 % (ref 44–72)
NEUTROPHILS NFR BLD: 84.6 % (ref 44–72)
NEUTROPHILS NFR BLD: 84.6 % (ref 44–72)
NEUTROPHILS NFR BLD: 84.8 % (ref 44–72)
NEUTROPHILS NFR BLD: 85.2 % (ref 44–72)
NEUTROPHILS NFR BLD: 85.2 % (ref 44–72)
NEUTROPHILS NFR BLD: 85.6 % (ref 44–72)
NEUTROPHILS NFR BLD: 86.5 % (ref 44–72)
NEUTROPHILS NFR BLD: 86.7 % (ref 44–72)
NEUTROPHILS NFR BLD: 86.8 % (ref 44–72)
NEUTROPHILS NFR BLD: 86.8 % (ref 44–72)
NEUTROPHILS NFR BLD: 86.9 % (ref 44–72)
NEUTROPHILS NFR BLD: 87 % (ref 44–72)
NEUTROPHILS NFR BLD: 87 % (ref 44–72)
NEUTROPHILS NFR BLD: 87.1 % (ref 44–72)
NEUTROPHILS NFR BLD: 87.2 % (ref 44–72)
NEUTROPHILS NFR BLD: 87.2 % (ref 44–72)
NEUTROPHILS NFR BLD: 87.3 % (ref 44–72)
NEUTROPHILS NFR BLD: 87.4 % (ref 44–72)
NEUTROPHILS NFR BLD: 87.8 % (ref 44–72)
NEUTROPHILS NFR BLD: 88 % (ref 44–72)
NEUTROPHILS NFR BLD: 88.3 % (ref 44–72)
NEUTROPHILS NFR BLD: 88.9 % (ref 44–72)
NEUTROPHILS NFR BLD: 89.1 % (ref 44–72)
NEUTROPHILS NFR BLD: 89.4 % (ref 44–72)
NEUTROPHILS NFR BLD: 89.4 % (ref 44–72)
NEUTROPHILS NFR BLD: 89.5 % (ref 44–72)
NEUTROPHILS NFR BLD: 89.6 % (ref 44–72)
NEUTROPHILS NFR BLD: 89.7 % (ref 44–72)
NEUTROPHILS NFR BLD: 89.7 % (ref 44–72)
NEUTROPHILS NFR BLD: 90.2 % (ref 44–72)
NEUTROPHILS NFR BLD: 90.5 % (ref 44–72)
NEUTROPHILS NFR BLD: 91.2 % (ref 44–72)
NEUTROPHILS NFR BLD: 91.2 % (ref 44–72)
NEUTROPHILS NFR BLD: 91.6 % (ref 44–72)
NEUTROPHILS NFR BLD: 91.8 % (ref 44–72)
NEUTROPHILS NFR BLD: 92.7 % (ref 44–72)
NEUTS BAND NFR BLD MANUAL: 0.9 % (ref 0–10)
NEUTS BAND NFR BLD MANUAL: 11.3 % (ref 0–10)
NEUTS BAND NFR BLD MANUAL: 3.5 % (ref 0–10)
NEUTS BAND NFR BLD MANUAL: 3.5 % (ref 0–10)
NEUTS BAND NFR BLD MANUAL: 4.3 % (ref 0–10)
NEUTS BAND NFR BLD MANUAL: 4.4 % (ref 0–10)
NEUTS BAND NFR BLD MANUAL: 6.1 % (ref 0–10)
NITRITE UR QL STRIP.AUTO: NEGATIVE
NRBC # BLD AUTO: 0 K/UL
NRBC # BLD AUTO: 0.02 K/UL
NRBC # BLD AUTO: 0.02 K/UL
NRBC # BLD AUTO: 0.03 K/UL
NRBC # BLD AUTO: 0.12 K/UL
NRBC # BLD AUTO: 0.15 K/UL
NRBC # BLD AUTO: 0.3 K/UL
NRBC BLD-RTO: 0 /100 WBC
NRBC BLD-RTO: 0.1 /100 WBC
NRBC BLD-RTO: 0.1 /100 WBC
NRBC BLD-RTO: 0.2 /100 WBC
NRBC BLD-RTO: 1.4 /100 WBC
NRBC BLD-RTO: 1.4 /100 WBC
NRBC BLD-RTO: 2 /100 WBC
NT-PROBNP SERPL IA-MCNC: 1818 PG/ML (ref 0–125)
NUCLEAR IGG SER QL IA: DETECTED
O2/TOTAL GAS SETTING VFR VENT: 100 %
O2/TOTAL GAS SETTING VFR VENT: 40 %
O2/TOTAL GAS SETTING VFR VENT: 50 %
OSMOLALITY SERPL: 284 MOSM/KG H2O (ref 278–298)
OSMOLALITY SERPL: 290 MOSM/KG H2O (ref 278–298)
OSMOLALITY UR: 295 MOSM/KG H2O (ref 300–900)
OVALOCYTES BLD QL SMEAR: NORMAL
PA AA BLD-ACNC: 48.8 %
PA ADP BLD-ACNC: 29.7 %
PATHOLOGY STUDY: ABNORMAL
PCO2 BLDA: 32.3 MMHG (ref 26–37)
PCO2 BLDA: 32.8 MMHG (ref 26–37)
PCO2 BLDA: 40.9 MMHG (ref 26–37)
PCO2 TEMP ADJ BLDA: 33.7 MMHG (ref 26–37)
PCO2 TEMP ADJ BLDA: 34.3 MMHG (ref 26–37)
PCO2 TEMP ADJ BLDA: 42.7 MMHG (ref 26–37)
PF4 HEPARIN CMPLX IGG SER-IMP: POSITIVE
PF4 HEPARIN CMPLX IGG SERPL IA: 0.52 OD
PH BLDA: 7.34 [PH] (ref 7.4–7.5)
PH BLDA: 7.37 [PH] (ref 7.4–7.5)
PH BLDA: 7.41 [PH] (ref 7.4–7.5)
PH TEMP ADJ BLDA: 7.32 [PH] (ref 7.4–7.5)
PH TEMP ADJ BLDA: 7.36 [PH] (ref 7.4–7.5)
PH TEMP ADJ BLDA: 7.39 [PH] (ref 7.4–7.5)
PH UR STRIP.AUTO: 5 [PH] (ref 5–8)
PH UR STRIP.AUTO: 5.5 [PH] (ref 5–8)
PH UR STRIP.AUTO: 6.5 [PH] (ref 5–8)
PHOSPHATE SERPL-MCNC: 1.8 MG/DL (ref 2.5–4.5)
PHOSPHATE SERPL-MCNC: 2 MG/DL (ref 2.5–4.5)
PHOSPHATE SERPL-MCNC: 2.1 MG/DL (ref 2.5–4.5)
PHOSPHATE SERPL-MCNC: 2.1 MG/DL (ref 2.5–4.5)
PHOSPHATE SERPL-MCNC: 2.2 MG/DL (ref 2.5–4.5)
PHOSPHATE SERPL-MCNC: 2.2 MG/DL (ref 2.5–4.5)
PHOSPHATE SERPL-MCNC: 2.3 MG/DL (ref 2.5–4.5)
PHOSPHATE SERPL-MCNC: 2.5 MG/DL (ref 2.5–4.5)
PHOSPHATE SERPL-MCNC: 2.5 MG/DL (ref 2.5–4.5)
PHOSPHATE SERPL-MCNC: 2.7 MG/DL (ref 2.5–4.5)
PHOSPHATE SERPL-MCNC: 2.7 MG/DL (ref 2.5–4.5)
PHOSPHATE SERPL-MCNC: 2.9 MG/DL (ref 2.5–4.5)
PHOSPHATE SERPL-MCNC: 3.1 MG/DL (ref 2.5–4.5)
PHOSPHATE SERPL-MCNC: 3.1 MG/DL (ref 2.5–4.5)
PHOSPHATE SERPL-MCNC: 3.2 MG/DL (ref 2.5–4.5)
PHOSPHATE SERPL-MCNC: 3.7 MG/DL (ref 2.5–4.5)
PHOSPHATE SERPL-MCNC: 4.5 MG/DL (ref 2.5–4.5)
PHOSPHATE SERPL-MCNC: 6.2 MG/DL (ref 2.5–4.5)
PLATELET # BLD AUTO: 101 K/UL (ref 164–446)
PLATELET # BLD AUTO: 101 K/UL (ref 164–446)
PLATELET # BLD AUTO: 110 K/UL (ref 164–446)
PLATELET # BLD AUTO: 117 K/UL (ref 164–446)
PLATELET # BLD AUTO: 119 K/UL (ref 164–446)
PLATELET # BLD AUTO: 124 K/UL (ref 164–446)
PLATELET # BLD AUTO: 132 K/UL (ref 164–446)
PLATELET # BLD AUTO: 134 K/UL (ref 164–446)
PLATELET # BLD AUTO: 136 K/UL (ref 164–446)
PLATELET # BLD AUTO: 140 K/UL (ref 164–446)
PLATELET # BLD AUTO: 142 K/UL (ref 164–446)
PLATELET # BLD AUTO: 142 K/UL (ref 164–446)
PLATELET # BLD AUTO: 145 K/UL (ref 164–446)
PLATELET # BLD AUTO: 152 K/UL (ref 164–446)
PLATELET # BLD AUTO: 159 K/UL (ref 164–446)
PLATELET # BLD AUTO: 161 K/UL (ref 164–446)
PLATELET # BLD AUTO: 165 K/UL (ref 164–446)
PLATELET # BLD AUTO: 166 K/UL (ref 164–446)
PLATELET # BLD AUTO: 175 K/UL (ref 164–446)
PLATELET # BLD AUTO: 203 K/UL (ref 164–446)
PLATELET # BLD AUTO: 221 K/UL (ref 164–446)
PLATELET # BLD AUTO: 230 K/UL (ref 164–446)
PLATELET # BLD AUTO: 240 K/UL (ref 164–446)
PLATELET # BLD AUTO: 242 K/UL (ref 164–446)
PLATELET # BLD AUTO: 249 K/UL (ref 164–446)
PLATELET # BLD AUTO: 271 K/UL (ref 164–446)
PLATELET # BLD AUTO: 28 K/UL (ref 164–446)
PLATELET # BLD AUTO: 295 K/UL (ref 164–446)
PLATELET # BLD AUTO: 34 K/UL (ref 164–446)
PLATELET # BLD AUTO: 35 K/UL (ref 164–446)
PLATELET # BLD AUTO: 41 K/UL (ref 164–446)
PLATELET # BLD AUTO: 42 K/UL (ref 164–446)
PLATELET # BLD AUTO: 42 K/UL (ref 164–446)
PLATELET # BLD AUTO: 43 K/UL (ref 164–446)
PLATELET # BLD AUTO: 43 K/UL (ref 164–446)
PLATELET # BLD AUTO: 44 K/UL (ref 164–446)
PLATELET # BLD AUTO: 45 K/UL (ref 164–446)
PLATELET # BLD AUTO: 46 K/UL (ref 164–446)
PLATELET # BLD AUTO: 55 K/UL (ref 164–446)
PLATELET # BLD AUTO: 57 K/UL (ref 164–446)
PLATELET # BLD AUTO: 57 K/UL (ref 164–446)
PLATELET # BLD AUTO: 59 K/UL (ref 164–446)
PLATELET # BLD AUTO: 59 K/UL (ref 164–446)
PLATELET # BLD AUTO: 60 K/UL (ref 164–446)
PLATELET # BLD AUTO: 62 K/UL (ref 164–446)
PLATELET # BLD AUTO: 66 K/UL (ref 164–446)
PLATELET # BLD AUTO: 66 K/UL (ref 164–446)
PLATELET # BLD AUTO: 68 K/UL (ref 164–446)
PLATELET # BLD AUTO: 68 K/UL (ref 164–446)
PLATELET # BLD AUTO: 71 K/UL (ref 164–446)
PLATELET # BLD AUTO: 71 K/UL (ref 164–446)
PLATELET # BLD AUTO: 76 K/UL (ref 164–446)
PLATELET # BLD AUTO: 81 K/UL (ref 164–446)
PLATELET # BLD AUTO: 84 K/UL (ref 164–446)
PLATELET # BLD AUTO: 97 K/UL (ref 164–446)
PLATELET # BLD AUTO: 98 K/UL (ref 164–446)
PLATELET BLD QL SMEAR: NORMAL
PMV BLD AUTO: 10.3 FL (ref 9–12.9)
PMV BLD AUTO: 10.4 FL (ref 9–12.9)
PMV BLD AUTO: 10.4 FL (ref 9–12.9)
PMV BLD AUTO: 10.5 FL (ref 9–12.9)
PMV BLD AUTO: 10.5 FL (ref 9–12.9)
PMV BLD AUTO: 10.6 FL (ref 9–12.9)
PMV BLD AUTO: 10.6 FL (ref 9–12.9)
PMV BLD AUTO: 10.7 FL (ref 9–12.9)
PMV BLD AUTO: 10.8 FL (ref 9–12.9)
PMV BLD AUTO: 10.9 FL (ref 9–12.9)
PMV BLD AUTO: 11 FL (ref 9–12.9)
PMV BLD AUTO: 11.2 FL (ref 9–12.9)
PMV BLD AUTO: 11.2 FL (ref 9–12.9)
PMV BLD AUTO: 11.3 FL (ref 9–12.9)
PMV BLD AUTO: 11.4 FL (ref 9–12.9)
PMV BLD AUTO: 11.5 FL (ref 9–12.9)
PMV BLD AUTO: 11.6 FL (ref 9–12.9)
PMV BLD AUTO: 11.7 FL (ref 9–12.9)
PMV BLD AUTO: 11.8 FL (ref 9–12.9)
PMV BLD AUTO: 11.9 FL (ref 9–12.9)
PMV BLD AUTO: 11.9 FL (ref 9–12.9)
PMV BLD AUTO: 12.1 FL (ref 9–12.9)
PMV BLD AUTO: 12.2 FL (ref 9–12.9)
PMV BLD AUTO: 12.4 FL (ref 9–12.9)
PMV BLD AUTO: 12.5 FL (ref 9–12.9)
PMV BLD AUTO: 12.6 FL (ref 9–12.9)
PO2 BLDA: 120 MMHG (ref 64–87)
PO2 BLDA: 126 MMHG (ref 64–87)
PO2 BLDA: 308 MMHG (ref 64–87)
PO2 TEMP ADJ BLDA: 123 MMHG (ref 64–87)
PO2 TEMP ADJ BLDA: 135 MMHG (ref 64–87)
PO2 TEMP ADJ BLDA: 313 MMHG (ref 64–87)
POIKILOCYTOSIS BLD QL SMEAR: NORMAL
POLYCHROMASIA BLD QL SMEAR: NORMAL
POTASSIUM SERPL-SCNC: 3.5 MMOL/L (ref 3.6–5.5)
POTASSIUM SERPL-SCNC: 3.6 MMOL/L (ref 3.6–5.5)
POTASSIUM SERPL-SCNC: 3.6 MMOL/L (ref 3.6–5.5)
POTASSIUM SERPL-SCNC: 3.7 MMOL/L (ref 3.6–5.5)
POTASSIUM SERPL-SCNC: 3.8 MMOL/L (ref 3.6–5.5)
POTASSIUM SERPL-SCNC: 3.9 MMOL/L (ref 3.6–5.5)
POTASSIUM SERPL-SCNC: 4 MMOL/L (ref 3.6–5.5)
POTASSIUM SERPL-SCNC: 4.1 MMOL/L (ref 3.6–5.5)
POTASSIUM SERPL-SCNC: 4.2 MMOL/L (ref 3.6–5.5)
POTASSIUM SERPL-SCNC: 4.3 MMOL/L (ref 3.6–5.5)
POTASSIUM SERPL-SCNC: 4.4 MMOL/L (ref 3.6–5.5)
POTASSIUM SERPL-SCNC: 4.5 MMOL/L (ref 3.6–5.5)
POTASSIUM SERPL-SCNC: 4.5 MMOL/L (ref 3.6–5.5)
POTASSIUM SERPL-SCNC: 4.6 MMOL/L (ref 3.6–5.5)
POTASSIUM SERPL-SCNC: 4.7 MMOL/L (ref 3.6–5.5)
POTASSIUM SERPL-SCNC: 4.8 MMOL/L (ref 3.6–5.5)
POTASSIUM SERPL-SCNC: 4.9 MMOL/L (ref 3.6–5.5)
POTASSIUM SERPL-SCNC: 5 MMOL/L (ref 3.6–5.5)
POTASSIUM SERPL-SCNC: 5.1 MMOL/L (ref 3.6–5.5)
POTASSIUM SERPL-SCNC: 5.2 MMOL/L (ref 3.6–5.5)
POTASSIUM SERPL-SCNC: 5.3 MMOL/L (ref 3.6–5.5)
POTASSIUM SERPL-SCNC: 5.3 MMOL/L (ref 3.6–5.5)
POTASSIUM SERPL-SCNC: 5.6 MMOL/L (ref 3.6–5.5)
PREALB SERPL-MCNC: 5 MG/DL (ref 18–38)
PROCALCITONIN SERPL-MCNC: 0.11 NG/ML
PROCALCITONIN SERPL-MCNC: 0.3 NG/ML
PROCALCITONIN SERPL-MCNC: 0.34 NG/ML
PROCALCITONIN SERPL-MCNC: 0.46 NG/ML
PROCALCITONIN SERPL-MCNC: 0.65 NG/ML
PRODUCT TYPE UPROD: NORMAL
PRODUCT TYPE UPROD: NORMAL
PROT SERPL-MCNC: 4.8 G/DL (ref 6–8.2)
PROT SERPL-MCNC: 4.9 G/DL (ref 6–8.2)
PROT SERPL-MCNC: 5 G/DL (ref 6–8.2)
PROT SERPL-MCNC: 5.2 G/DL (ref 6–8.2)
PROT SERPL-MCNC: 5.2 G/DL (ref 6–8.2)
PROT SERPL-MCNC: 5.3 G/DL (ref 6–8.2)
PROT SERPL-MCNC: 5.4 G/DL (ref 6–8.2)
PROT SERPL-MCNC: 5.5 G/DL (ref 6–8.2)
PROT SERPL-MCNC: 5.5 G/DL (ref 6–8.2)
PROT SERPL-MCNC: 5.6 G/DL (ref 6–8.2)
PROT SERPL-MCNC: 5.7 G/DL (ref 6–8.2)
PROT SERPL-MCNC: 5.8 G/DL (ref 6–8.2)
PROT SERPL-MCNC: 5.9 G/DL (ref 6–8.2)
PROT SERPL-MCNC: 6 G/DL (ref 6–8.2)
PROT SERPL-MCNC: 6 G/DL (ref 6–8.2)
PROT SERPL-MCNC: 6.1 G/DL (ref 6–8.2)
PROT SERPL-MCNC: 6.1 G/DL (ref 6–8.2)
PROT SERPL-MCNC: 6.2 G/DL (ref 6–8.2)
PROT SERPL-MCNC: 6.3 G/DL (ref 6.3–8.2)
PROT SERPL-MCNC: 6.3 G/DL (ref 6–8.2)
PROT SERPL-MCNC: 6.4 G/DL (ref 6–8.2)
PROT SERPL-MCNC: 6.4 G/DL (ref 6–8.2)
PROT SERPL-MCNC: 6.6 G/DL (ref 6–8.2)
PROT SERPL-MCNC: 6.8 G/DL (ref 6–8.2)
PROT SERPL-MCNC: 7.1 G/DL (ref 6–8.2)
PROT SERPL-MCNC: 7.6 G/DL (ref 6–8.2)
PROT UR QL STRIP: 100 MG/DL
PROT UR QL STRIP: 300 MG/DL
PROT UR QL STRIP: NEGATIVE MG/DL
PROTHROMBIN TIME: 14.1 SEC (ref 12–14.6)
PROTHROMBIN TIME: 14.8 SEC (ref 12–14.6)
PROTHROMBIN TIME: 15 SEC (ref 12–14.6)
PROTHROMBIN TIME: 15.1 SEC (ref 12–14.6)
PROTHROMBIN TIME: 15.3 SEC (ref 12–14.6)
PROTHROMBIN TIME: 15.3 SEC (ref 12–14.6)
PROTHROMBIN TIME: 15.4 SEC (ref 12–14.6)
PROTHROMBIN TIME: 15.7 SEC (ref 12–14.6)
PROTHROMBIN TIME: 15.9 SEC (ref 12–14.6)
PROTHROMBIN TIME: 15.9 SEC (ref 12–14.6)
PROTHROMBIN TIME: 16.1 SEC (ref 12–14.6)
PROTHROMBIN TIME: 17.3 SEC (ref 12–14.6)
PROTHROMBIN TIME: 18.2 SEC (ref 12–14.6)
PROTHROMBIN TIME: 19.3 SEC (ref 12–14.6)
PROTHROMBIN TIME: 19.9 SEC (ref 12–14.6)
PROTHROMBIN TIME: 22.6 SEC (ref 12–14.6)
PROTHROMBIN TIME: 29.8 SEC (ref 12–14.6)
PROTHROMBIN TIME: 30.4 SEC (ref 12–14.6)
PROTHROMBIN TIME: 30.7 SEC (ref 12–14.6)
PROTHROMBIN TIME: 32.4 SEC (ref 12–14.6)
PROTHROMBIN TIME: 32.9 SEC (ref 12–14.6)
PROTHROMBIN TIME: 43.9 SEC (ref 12–14.6)
PROTHROMBIN TIME: 53.2 SEC (ref 12–14.6)
RBC # BLD AUTO: 2.95 M/UL (ref 4.7–6.1)
RBC # BLD AUTO: 2.97 M/UL (ref 4.7–6.1)
RBC # BLD AUTO: 3.03 M/UL (ref 4.7–6.1)
RBC # BLD AUTO: 3.03 M/UL (ref 4.7–6.1)
RBC # BLD AUTO: 3.04 M/UL (ref 4.7–6.1)
RBC # BLD AUTO: 3.05 M/UL (ref 4.7–6.1)
RBC # BLD AUTO: 3.06 M/UL (ref 4.7–6.1)
RBC # BLD AUTO: 3.08 M/UL (ref 4.7–6.1)
RBC # BLD AUTO: 3.08 M/UL (ref 4.7–6.1)
RBC # BLD AUTO: 3.13 M/UL (ref 4.7–6.1)
RBC # BLD AUTO: 3.16 M/UL (ref 4.7–6.1)
RBC # BLD AUTO: 3.17 M/UL (ref 4.7–6.1)
RBC # BLD AUTO: 3.17 M/UL (ref 4.7–6.1)
RBC # BLD AUTO: 3.18 M/UL (ref 4.7–6.1)
RBC # BLD AUTO: 3.22 M/UL (ref 4.7–6.1)
RBC # BLD AUTO: 3.26 M/UL (ref 4.7–6.1)
RBC # BLD AUTO: 3.28 M/UL (ref 4.7–6.1)
RBC # BLD AUTO: 3.33 M/UL (ref 4.7–6.1)
RBC # BLD AUTO: 3.35 M/UL (ref 4.7–6.1)
RBC # BLD AUTO: 3.37 M/UL (ref 4.7–6.1)
RBC # BLD AUTO: 3.39 M/UL (ref 4.7–6.1)
RBC # BLD AUTO: 3.41 M/UL (ref 4.7–6.1)
RBC # BLD AUTO: 3.41 M/UL (ref 4.7–6.1)
RBC # BLD AUTO: 3.43 M/UL (ref 4.7–6.1)
RBC # BLD AUTO: 3.45 M/UL (ref 4.7–6.1)
RBC # BLD AUTO: 3.46 M/UL (ref 4.7–6.1)
RBC # BLD AUTO: 3.48 M/UL (ref 4.7–6.1)
RBC # BLD AUTO: 3.48 M/UL (ref 4.7–6.1)
RBC # BLD AUTO: 3.5 M/UL (ref 4.7–6.1)
RBC # BLD AUTO: 3.5 M/UL (ref 4.7–6.1)
RBC # BLD AUTO: 3.51 M/UL (ref 4.7–6.1)
RBC # BLD AUTO: 3.52 M/UL (ref 4.7–6.1)
RBC # BLD AUTO: 3.53 M/UL (ref 4.7–6.1)
RBC # BLD AUTO: 3.59 M/UL (ref 4.7–6.1)
RBC # BLD AUTO: 3.59 M/UL (ref 4.7–6.1)
RBC # BLD AUTO: 3.62 M/UL (ref 4.7–6.1)
RBC # BLD AUTO: 3.63 M/UL (ref 4.7–6.1)
RBC # BLD AUTO: 3.65 M/UL (ref 4.7–6.1)
RBC # BLD AUTO: 3.68 M/UL (ref 4.7–6.1)
RBC # BLD AUTO: 3.74 M/UL (ref 4.7–6.1)
RBC # BLD AUTO: 3.74 M/UL (ref 4.7–6.1)
RBC # BLD AUTO: 3.77 M/UL (ref 4.7–6.1)
RBC # BLD AUTO: 3.8 M/UL (ref 4.7–6.1)
RBC # BLD AUTO: 3.9 M/UL (ref 4.7–6.1)
RBC # BLD AUTO: 3.91 M/UL (ref 4.7–6.1)
RBC # BLD AUTO: 3.92 M/UL (ref 4.7–6.1)
RBC # BLD AUTO: 3.94 M/UL (ref 4.7–6.1)
RBC # BLD AUTO: 3.96 M/UL (ref 4.7–6.1)
RBC # BLD AUTO: 3.97 M/UL (ref 4.7–6.1)
RBC # BLD AUTO: 3.97 M/UL (ref 4.7–6.1)
RBC # BLD AUTO: 4.05 M/UL (ref 4.7–6.1)
RBC # BLD AUTO: 4.14 M/UL (ref 4.7–6.1)
RBC # BLD AUTO: 4.17 M/UL (ref 4.7–6.1)
RBC # BLD AUTO: 4.45 M/UL (ref 4.7–6.1)
RBC # BLD AUTO: 4.86 M/UL (ref 4.7–6.1)
RBC # BLD AUTO: 5.81 M/UL (ref 4.7–6.1)
RBC # URNS HPF: ABNORMAL /HPF
RBC BLD AUTO: PRESENT
RBC UR QL AUTO: ABNORMAL
RBC UR QL AUTO: NEGATIVE
RH BLD: NORMAL
RHEUMATOID FACT SER IA-ACNC: 18 IU/ML (ref 0–14)
RSV RNA SPEC QL NAA+PROBE: NEGATIVE
SAO2 % BLDA: 100 % (ref 93–99)
SAO2 % BLDA: 99 % (ref 93–99)
SAO2 % BLDA: 99 % (ref 93–99)
SARS-COV+SARS-COV-2 AG RESP QL IA.RAPID: NOTDETECTED
SARS-COV-2 RNA RESP QL NAA+PROBE: DETECTED
SARS-COV-2 RNA RESP QL NAA+PROBE: NOTDETECTED
SCHISTOCYTES BLD QL SMEAR: NORMAL
SCHISTOCYTES BLD QL SMEAR: NORMAL
SCREEN DRVVT: 36.3 SEC (ref 28–48)
SIGNIFICANT IND 70042: ABNORMAL
SIGNIFICANT IND 70042: ABNORMAL
SIGNIFICANT IND 70042: NORMAL
SITE SITE: ABNORMAL
SITE SITE: ABNORMAL
SITE SITE: NORMAL
SODIUM SERPL-SCNC: 125 MMOL/L (ref 135–145)
SODIUM SERPL-SCNC: 126 MMOL/L (ref 135–145)
SODIUM SERPL-SCNC: 127 MMOL/L (ref 135–145)
SODIUM SERPL-SCNC: 128 MMOL/L (ref 135–145)
SODIUM SERPL-SCNC: 129 MMOL/L (ref 135–145)
SODIUM SERPL-SCNC: 130 MMOL/L (ref 135–145)
SODIUM SERPL-SCNC: 131 MMOL/L (ref 135–145)
SODIUM SERPL-SCNC: 132 MMOL/L (ref 135–145)
SODIUM SERPL-SCNC: 133 MMOL/L (ref 135–145)
SODIUM SERPL-SCNC: 133 MMOL/L (ref 135–145)
SODIUM SERPL-SCNC: 134 MMOL/L (ref 135–145)
SODIUM SERPL-SCNC: 135 MMOL/L (ref 135–145)
SODIUM SERPL-SCNC: 136 MMOL/L (ref 135–145)
SODIUM SERPL-SCNC: 137 MMOL/L (ref 135–145)
SODIUM SERPL-SCNC: 139 MMOL/L (ref 135–145)
SODIUM SERPL-SCNC: 139 MMOL/L (ref 135–145)
SODIUM SERPL-SCNC: 151 MMOL/L (ref 135–145)
SODIUM SERPL-SCNC: 151 MMOL/L (ref 135–145)
SODIUM SERPL-SCNC: 152 MMOL/L (ref 135–145)
SODIUM SERPL-SCNC: 152 MMOL/L (ref 135–145)
SODIUM SERPL-SCNC: 157 MMOL/L (ref 135–145)
SODIUM UR-SCNC: 34 MMOL/L
SOURCE SOURCE: ABNORMAL
SOURCE SOURCE: ABNORMAL
SOURCE SOURCE: NORMAL
SP GR UR STRIP.AUTO: 1.02
SP GR UR STRIP.AUTO: 1.03
SPECIMEN DRAWN FROM PATIENT: ABNORMAL
SPECIMEN SOURCE: ABNORMAL
SPECIMEN SOURCE: NORMAL
SRA 0.1IU/ML UFH SER-ACNC: 12 %
SRA 100IU/ML UFH SER-ACNC: 7 %
SRA PORCINE INTERP SER-IMP: NEGATIVE
SRA UFH SER-IMP: NORMAL
SSA52 R0ENA AB IGG Q0420: 49 AU/ML (ref 0–40)
SSA60 R0ENA AB IGG Q0419: 42 AU/ML (ref 0–40)
T4 FREE SERPL-MCNC: 1.18 NG/DL (ref 0.93–1.7)
T4 SERPL-MCNC: 5.7 UG/DL (ref 4–12)
TEG ALGORITHM TGALG: ABNORMAL
TOXIC GRANULES BLD QL SMEAR: NORMAL
TRIGL SERPL-MCNC: 104 MG/DL (ref 0–149)
TROPONIN T SERPL-MCNC: 20 NG/L (ref 6–19)
TROPONIN T SERPL-MCNC: 40 NG/L (ref 6–19)
TROPONIN T SERPL-MCNC: 47 NG/L (ref 6–19)
TROPONIN T SERPL-MCNC: 56 NG/L (ref 6–19)
TROPONIN T SERPL-MCNC: 81 NG/L (ref 6–19)
TSH SERPL DL<=0.005 MIU/L-ACNC: 1.11 UIU/ML (ref 0.38–5.33)
TSH SERPL DL<=0.005 MIU/L-ACNC: 1.98 UIU/ML (ref 0.38–5.33)
TSH SERPL DL<=0.005 MIU/L-ACNC: 7.33 UIU/ML (ref 0.38–5.33)
U1 SNRNP IGG SER QL: NORMAL
UFH PPP CHRO-ACNC: 0.25 IU/ML
UFH PPP CHRO-ACNC: 0.26 IU/ML
UFH PPP CHRO-ACNC: 0.27 IU/ML
UFH PPP CHRO-ACNC: 0.33 IU/ML
UFH PPP CHRO-ACNC: 0.33 IU/ML
UFH PPP CHRO-ACNC: 0.42 IU/ML
UFH PPP CHRO-ACNC: 0.5 IU/ML
UFH PPP CHRO-ACNC: 0.53 IU/ML
UFH PPP CHRO-ACNC: <0.1 IU/ML
UFH PPP CHRO-ACNC: <0.1 U/ML
UNIT STATUS USTAT: NORMAL
UNIT STATUS USTAT: NORMAL
URATE SERPL-MCNC: 6.5 MG/DL (ref 2.5–8.3)
UROBILINOGEN UR STRIP.AUTO-MCNC: 0.2 MG/DL
UROBILINOGEN UR STRIP.AUTO-MCNC: 1 MG/DL
UROBILINOGEN UR STRIP.AUTO-MCNC: 1 MG/DL
VANCOMYCIN TROUGH SERPL-MCNC: 13.1 UG/ML (ref 10–20)
VIT B12 SERPL-MCNC: 359 PG/ML (ref 211–911)
WBC # BLD AUTO: 10.3 K/UL (ref 4.8–10.8)
WBC # BLD AUTO: 10.8 K/UL (ref 4.8–10.8)
WBC # BLD AUTO: 10.9 K/UL (ref 4.8–10.8)
WBC # BLD AUTO: 11.2 K/UL (ref 4.8–10.8)
WBC # BLD AUTO: 12 K/UL (ref 4.8–10.8)
WBC # BLD AUTO: 12.1 K/UL (ref 4.8–10.8)
WBC # BLD AUTO: 12.2 K/UL (ref 4.8–10.8)
WBC # BLD AUTO: 12.4 K/UL (ref 4.8–10.8)
WBC # BLD AUTO: 12.7 K/UL (ref 4.8–10.8)
WBC # BLD AUTO: 13.3 K/UL (ref 4.8–10.8)
WBC # BLD AUTO: 13.9 K/UL (ref 4.8–10.8)
WBC # BLD AUTO: 14.7 K/UL (ref 4.8–10.8)
WBC # BLD AUTO: 15 K/UL (ref 4.8–10.8)
WBC # BLD AUTO: 15.1 K/UL (ref 4.8–10.8)
WBC # BLD AUTO: 15.2 K/UL (ref 4.8–10.8)
WBC # BLD AUTO: 15.3 K/UL (ref 4.8–10.8)
WBC # BLD AUTO: 15.8 K/UL (ref 4.8–10.8)
WBC # BLD AUTO: 16.1 K/UL (ref 4.8–10.8)
WBC # BLD AUTO: 16.2 K/UL (ref 4.8–10.8)
WBC # BLD AUTO: 16.5 K/UL (ref 4.8–10.8)
WBC # BLD AUTO: 26.9 K/UL (ref 4.8–10.8)
WBC # BLD AUTO: 28.8 K/UL (ref 4.8–10.8)
WBC # BLD AUTO: 3.3 K/UL (ref 4.8–10.8)
WBC # BLD AUTO: 3.6 K/UL (ref 4.8–10.8)
WBC # BLD AUTO: 3.8 K/UL (ref 4.8–10.8)
WBC # BLD AUTO: 3.8 K/UL (ref 4.8–10.8)
WBC # BLD AUTO: 3.9 K/UL (ref 4.8–10.8)
WBC # BLD AUTO: 3.9 K/UL (ref 4.8–10.8)
WBC # BLD AUTO: 4.3 K/UL (ref 4.8–10.8)
WBC # BLD AUTO: 4.7 K/UL (ref 4.8–10.8)
WBC # BLD AUTO: 4.9 K/UL (ref 4.8–10.8)
WBC # BLD AUTO: 5 K/UL (ref 4.8–10.8)
WBC # BLD AUTO: 5.1 K/UL (ref 4.8–10.8)
WBC # BLD AUTO: 5.3 K/UL (ref 4.8–10.8)
WBC # BLD AUTO: 5.3 K/UL (ref 4.8–10.8)
WBC # BLD AUTO: 5.5 K/UL (ref 4.8–10.8)
WBC # BLD AUTO: 5.5 K/UL (ref 4.8–10.8)
WBC # BLD AUTO: 5.8 K/UL (ref 4.8–10.8)
WBC # BLD AUTO: 6 K/UL (ref 4.8–10.8)
WBC # BLD AUTO: 6.4 K/UL (ref 4.8–10.8)
WBC # BLD AUTO: 6.4 K/UL (ref 4.8–10.8)
WBC # BLD AUTO: 6.6 K/UL (ref 4.8–10.8)
WBC # BLD AUTO: 6.7 K/UL (ref 4.8–10.8)
WBC # BLD AUTO: 6.7 K/UL (ref 4.8–10.8)
WBC # BLD AUTO: 6.8 K/UL (ref 4.8–10.8)
WBC # BLD AUTO: 6.9 K/UL (ref 4.8–10.8)
WBC # BLD AUTO: 7 K/UL (ref 4.8–10.8)
WBC # BLD AUTO: 7.1 K/UL (ref 4.8–10.8)
WBC # BLD AUTO: 7.2 K/UL (ref 4.8–10.8)
WBC # BLD AUTO: 7.6 K/UL (ref 4.8–10.8)
WBC # BLD AUTO: 7.7 K/UL (ref 4.8–10.8)
WBC # BLD AUTO: 7.9 K/UL (ref 4.8–10.8)
WBC # BLD AUTO: 8.4 K/UL (ref 4.8–10.8)
WBC # BLD AUTO: 9.5 K/UL (ref 4.8–10.8)
WBC #/AREA URNS HPF: ABNORMAL /HPF
WBC STL QL MICRO: NORMAL

## 2021-01-01 PROCEDURE — 700101 HCHG RX REV CODE 250: Performed by: STUDENT IN AN ORGANIZED HEALTH CARE EDUCATION/TRAINING PROGRAM

## 2021-01-01 PROCEDURE — 700111 HCHG RX REV CODE 636 W/ 250 OVERRIDE (IP): Performed by: STUDENT IN AN ORGANIZED HEALTH CARE EDUCATION/TRAINING PROGRAM

## 2021-01-01 PROCEDURE — A9270 NON-COVERED ITEM OR SERVICE: HCPCS | Performed by: INTERNAL MEDICINE

## 2021-01-01 PROCEDURE — 83735 ASSAY OF MAGNESIUM: CPT

## 2021-01-01 PROCEDURE — 99233 SBSQ HOSP IP/OBS HIGH 50: CPT | Mod: GC | Performed by: PSYCHIATRY & NEUROLOGY

## 2021-01-01 PROCEDURE — 700102 HCHG RX REV CODE 250 W/ 637 OVERRIDE(OP): Performed by: NURSE PRACTITIONER

## 2021-01-01 PROCEDURE — 36415 COLL VENOUS BLD VENIPUNCTURE: CPT

## 2021-01-01 PROCEDURE — 700102 HCHG RX REV CODE 250 W/ 637 OVERRIDE(OP): Performed by: INTERNAL MEDICINE

## 2021-01-01 PROCEDURE — 71045 X-RAY EXAM CHEST 1 VIEW: CPT

## 2021-01-01 PROCEDURE — 99232 SBSQ HOSP IP/OBS MODERATE 35: CPT | Mod: GC | Performed by: INTERNAL MEDICINE

## 2021-01-01 PROCEDURE — 87426 SARSCOV CORONAVIRUS AG IA: CPT

## 2021-01-01 PROCEDURE — A9270 NON-COVERED ITEM OR SERVICE: HCPCS | Performed by: STUDENT IN AN ORGANIZED HEALTH CARE EDUCATION/TRAINING PROGRAM

## 2021-01-01 PROCEDURE — 94760 N-INVAS EAR/PLS OXIMETRY 1: CPT

## 2021-01-01 PROCEDURE — 87493 C DIFF AMPLIFIED PROBE: CPT

## 2021-01-01 PROCEDURE — 82140 ASSAY OF AMMONIA: CPT

## 2021-01-01 PROCEDURE — U0005 INFEC AGEN DETEC AMPLI PROBE: HCPCS

## 2021-01-01 PROCEDURE — A9270 NON-COVERED ITEM OR SERVICE: HCPCS | Performed by: NURSE PRACTITIONER

## 2021-01-01 PROCEDURE — 700102 HCHG RX REV CODE 250 W/ 637 OVERRIDE(OP): Performed by: STUDENT IN AN ORGANIZED HEALTH CARE EDUCATION/TRAINING PROGRAM

## 2021-01-01 PROCEDURE — C9803 HOPD COVID-19 SPEC COLLECT: HCPCS | Performed by: STUDENT IN AN ORGANIZED HEALTH CARE EDUCATION/TRAINING PROGRAM

## 2021-01-01 PROCEDURE — 82962 GLUCOSE BLOOD TEST: CPT | Mod: 91

## 2021-01-01 PROCEDURE — 770006 HCHG ROOM/CARE - MED/SURG/GYN SEMI*

## 2021-01-01 PROCEDURE — 700111 HCHG RX REV CODE 636 W/ 250 OVERRIDE (IP): Performed by: INTERNAL MEDICINE

## 2021-01-01 PROCEDURE — 94640 AIRWAY INHALATION TREATMENT: CPT

## 2021-01-01 PROCEDURE — 700102 HCHG RX REV CODE 250 W/ 637 OVERRIDE(OP): Performed by: PSYCHIATRY & NEUROLOGY

## 2021-01-01 PROCEDURE — 700111 HCHG RX REV CODE 636 W/ 250 OVERRIDE (IP): Performed by: PSYCHIATRY & NEUROLOGY

## 2021-01-01 PROCEDURE — 99239 HOSP IP/OBS DSCHRG MGMT >30: CPT | Performed by: STUDENT IN AN ORGANIZED HEALTH CARE EDUCATION/TRAINING PROGRAM

## 2021-01-01 PROCEDURE — 80053 COMPREHEN METABOLIC PANEL: CPT

## 2021-01-01 PROCEDURE — 94150 VITAL CAPACITY TEST: CPT

## 2021-01-01 PROCEDURE — 36600 WITHDRAWAL OF ARTERIAL BLOOD: CPT

## 2021-01-01 PROCEDURE — 82962 GLUCOSE BLOOD TEST: CPT

## 2021-01-01 PROCEDURE — 700111 HCHG RX REV CODE 636 W/ 250 OVERRIDE (IP)

## 2021-01-01 PROCEDURE — 99233 SBSQ HOSP IP/OBS HIGH 50: CPT | Performed by: HOSPITALIST

## 2021-01-01 PROCEDURE — 85025 COMPLETE CBC W/AUTO DIFF WBC: CPT

## 2021-01-01 PROCEDURE — A9270 NON-COVERED ITEM OR SERVICE: HCPCS | Performed by: HOSPITALIST

## 2021-01-01 PROCEDURE — 99291 CRITICAL CARE FIRST HOUR: CPT | Performed by: PSYCHIATRY & NEUROLOGY

## 2021-01-01 PROCEDURE — 85610 PROTHROMBIN TIME: CPT

## 2021-01-01 PROCEDURE — 86022 PLATELET ANTIBODIES: CPT

## 2021-01-01 PROCEDURE — 80048 BASIC METABOLIC PNL TOTAL CA: CPT

## 2021-01-01 PROCEDURE — 84484 ASSAY OF TROPONIN QUANT: CPT

## 2021-01-01 PROCEDURE — A9270 NON-COVERED ITEM OR SERVICE: HCPCS | Performed by: PSYCHIATRY & NEUROLOGY

## 2021-01-01 PROCEDURE — 97530 THERAPEUTIC ACTIVITIES: CPT | Mod: CQ

## 2021-01-01 PROCEDURE — 770022 HCHG ROOM/CARE - ICU (200)

## 2021-01-01 PROCEDURE — 93005 ELECTROCARDIOGRAM TRACING: CPT | Performed by: EMERGENCY MEDICINE

## 2021-01-01 PROCEDURE — 51798 US URINE CAPACITY MEASURE: CPT

## 2021-01-01 PROCEDURE — 89055 LEUKOCYTE ASSESSMENT FECAL: CPT

## 2021-01-01 PROCEDURE — 84134 ASSAY OF PREALBUMIN: CPT

## 2021-01-01 PROCEDURE — 99232 SBSQ HOSP IP/OBS MODERATE 35: CPT | Performed by: INTERNAL MEDICINE

## 2021-01-01 PROCEDURE — 99212 OFFICE O/P EST SF 10 MIN: CPT | Performed by: NURSE PRACTITIONER

## 2021-01-01 PROCEDURE — 97162 PT EVAL MOD COMPLEX 30 MIN: CPT

## 2021-01-01 PROCEDURE — 70450 CT HEAD/BRAIN W/O DYE: CPT

## 2021-01-01 PROCEDURE — 700105 HCHG RX REV CODE 258: Performed by: INTERNAL MEDICINE

## 2021-01-01 PROCEDURE — 97535 SELF CARE MNGMENT TRAINING: CPT | Mod: CO

## 2021-01-01 PROCEDURE — 84100 ASSAY OF PHOSPHORUS: CPT

## 2021-01-01 PROCEDURE — 85730 THROMBOPLASTIN TIME PARTIAL: CPT

## 2021-01-01 PROCEDURE — 700105 HCHG RX REV CODE 258: Performed by: STUDENT IN AN ORGANIZED HEALTH CARE EDUCATION/TRAINING PROGRAM

## 2021-01-01 PROCEDURE — 84550 ASSAY OF BLOOD/URIC ACID: CPT

## 2021-01-01 PROCEDURE — 92526 ORAL FUNCTION THERAPY: CPT

## 2021-01-01 PROCEDURE — 770020 HCHG ROOM/CARE - TELE (206)

## 2021-01-01 PROCEDURE — G0378 HOSPITAL OBSERVATION PER HR: HCPCS

## 2021-01-01 PROCEDURE — 83605 ASSAY OF LACTIC ACID: CPT

## 2021-01-01 PROCEDURE — 97116 GAIT TRAINING THERAPY: CPT

## 2021-01-01 PROCEDURE — 83935 ASSAY OF URINE OSMOLALITY: CPT

## 2021-01-01 PROCEDURE — B24BZZ4 ULTRASONOGRAPHY OF HEART WITH AORTA, TRANSESOPHAGEAL: ICD-10-PCS | Performed by: INTERNAL MEDICINE

## 2021-01-01 PROCEDURE — 86431 RHEUMATOID FACTOR QUANT: CPT

## 2021-01-01 PROCEDURE — 94002 VENT MGMT INPAT INIT DAY: CPT

## 2021-01-01 PROCEDURE — 99291 CRITICAL CARE FIRST HOUR: CPT | Mod: GC | Performed by: PSYCHIATRY & NEUROLOGY

## 2021-01-01 PROCEDURE — 99232 SBSQ HOSP IP/OBS MODERATE 35: CPT | Performed by: STUDENT IN AN ORGANIZED HEALTH CARE EDUCATION/TRAINING PROGRAM

## 2021-01-01 PROCEDURE — 85014 HEMATOCRIT: CPT

## 2021-01-01 PROCEDURE — 700102 HCHG RX REV CODE 250 W/ 637 OVERRIDE(OP): Performed by: EMERGENCY MEDICINE

## 2021-01-01 PROCEDURE — 700102 HCHG RX REV CODE 250 W/ 637 OVERRIDE(OP): Performed by: HOSPITALIST

## 2021-01-01 PROCEDURE — 87077 CULTURE AEROBIC IDENTIFY: CPT

## 2021-01-01 PROCEDURE — 700101 HCHG RX REV CODE 250: Performed by: INTERNAL MEDICINE

## 2021-01-01 PROCEDURE — 0241U HCHG SARS-COV-2 COVID-19 NFCT DS RESP RNA 4 TRGT MIC: CPT

## 2021-01-01 PROCEDURE — 8E0ZXY6 ISOLATION: ICD-10-PCS | Performed by: INTERNAL MEDICINE

## 2021-01-01 PROCEDURE — 700111 HCHG RX REV CODE 636 W/ 250 OVERRIDE (IP): Performed by: NEUROLOGICAL SURGERY

## 2021-01-01 PROCEDURE — U0003 INFECTIOUS AGENT DETECTION BY NUCLEIC ACID (DNA OR RNA); SEVERE ACUTE RESPIRATORY SYNDROME CORONAVIRUS 2 (SARS-COV-2) (CORONAVIRUS DISEASE [COVID-19]), AMPLIFIED PROBE TECHNIQUE, MAKING USE OF HIGH THROUGHPUT TECHNOLOGIES AS DESCRIBED BY CMS-2020-01-R: HCPCS

## 2021-01-01 PROCEDURE — 700105 HCHG RX REV CODE 258: Performed by: PSYCHIATRY & NEUROLOGY

## 2021-01-01 PROCEDURE — 82533 TOTAL CORTISOL: CPT

## 2021-01-01 PROCEDURE — 160029 HCHG SURGERY MINUTES - 1ST 30 MINS LEVEL 4: Performed by: NEUROLOGICAL SURGERY

## 2021-01-01 PROCEDURE — 99233 SBSQ HOSP IP/OBS HIGH 50: CPT | Performed by: INTERNAL MEDICINE

## 2021-01-01 PROCEDURE — 700105 HCHG RX REV CODE 258: Performed by: EMERGENCY MEDICINE

## 2021-01-01 PROCEDURE — 92610 EVALUATE SWALLOWING FUNCTION: CPT

## 2021-01-01 PROCEDURE — 81003 URINALYSIS AUTO W/O SCOPE: CPT

## 2021-01-01 PROCEDURE — 85027 COMPLETE CBC AUTOMATED: CPT

## 2021-01-01 PROCEDURE — 83930 ASSAY OF BLOOD OSMOLALITY: CPT

## 2021-01-01 PROCEDURE — 97112 NEUROMUSCULAR REEDUCATION: CPT | Mod: CO

## 2021-01-01 PROCEDURE — 93306 TTE W/DOPPLER COMPLETE: CPT

## 2021-01-01 PROCEDURE — 99291 CRITICAL CARE FIRST HOUR: CPT | Mod: 25 | Performed by: INTERNAL MEDICINE

## 2021-01-01 PROCEDURE — 85007 BL SMEAR W/DIFF WBC COUNT: CPT

## 2021-01-01 PROCEDURE — 93306 TTE W/DOPPLER COMPLETE: CPT | Mod: 26 | Performed by: STUDENT IN AN ORGANIZED HEALTH CARE EDUCATION/TRAINING PROGRAM

## 2021-01-01 PROCEDURE — 83880 ASSAY OF NATRIURETIC PEPTIDE: CPT

## 2021-01-01 PROCEDURE — 302043 TAPE, HYPAFIX

## 2021-01-01 PROCEDURE — 700111 HCHG RX REV CODE 636 W/ 250 OVERRIDE (IP): Performed by: NURSE PRACTITIONER

## 2021-01-01 PROCEDURE — 87899 AGENT NOS ASSAY W/OPTIC: CPT | Mod: 91

## 2021-01-01 PROCEDURE — 84145 PROCALCITONIN (PCT): CPT

## 2021-01-01 PROCEDURE — 93971 EXTREMITY STUDY: CPT | Mod: 26,RT | Performed by: INTERNAL MEDICINE

## 2021-01-01 PROCEDURE — RXMED WILLOW AMBULATORY MEDICATION CHARGE: Performed by: INTERNAL MEDICINE

## 2021-01-01 PROCEDURE — 85384 FIBRINOGEN ACTIVITY: CPT

## 2021-01-01 PROCEDURE — C9803 HOPD COVID-19 SPEC COLLECT: HCPCS | Performed by: EMERGENCY MEDICINE

## 2021-01-01 PROCEDURE — 93010 ELECTROCARDIOGRAM REPORT: CPT | Performed by: INTERNAL MEDICINE

## 2021-01-01 PROCEDURE — 81001 URINALYSIS AUTO W/SCOPE: CPT

## 2021-01-01 PROCEDURE — 70450 CT HEAD/BRAIN W/O DYE: CPT | Mod: ME

## 2021-01-01 PROCEDURE — 96365 THER/PROPH/DIAG IV INF INIT: CPT

## 2021-01-01 PROCEDURE — 99223 1ST HOSP IP/OBS HIGH 75: CPT | Performed by: STUDENT IN AN ORGANIZED HEALTH CARE EDUCATION/TRAINING PROGRAM

## 2021-01-01 PROCEDURE — 80061 LIPID PANEL: CPT

## 2021-01-01 PROCEDURE — A9270 NON-COVERED ITEM OR SERVICE: HCPCS | Performed by: EMERGENCY MEDICINE

## 2021-01-01 PROCEDURE — B41F1ZZ FLUOROSCOPY OF RIGHT LOWER EXTREMITY ARTERIES USING LOW OSMOLAR CONTRAST: ICD-10-PCS | Performed by: RADIOLOGY

## 2021-01-01 PROCEDURE — 302135 SEQUENTIAL COMPRESSION MACHINE: Performed by: PSYCHIATRY & NEUROLOGY

## 2021-01-01 PROCEDURE — 85025 COMPLETE CBC W/AUTO DIFF WBC: CPT | Mod: 91

## 2021-01-01 PROCEDURE — 86850 RBC ANTIBODY SCREEN: CPT

## 2021-01-01 PROCEDURE — 96366 THER/PROPH/DIAG IV INF ADDON: CPT

## 2021-01-01 PROCEDURE — 500331 HCHG COTTONOID, SURG PATTIE: Performed by: NEUROLOGICAL SURGERY

## 2021-01-01 PROCEDURE — 86901 BLOOD TYPING SEROLOGIC RH(D): CPT

## 2021-01-01 PROCEDURE — 700111 HCHG RX REV CODE 636 W/ 250 OVERRIDE (IP): Performed by: ANESTHESIOLOGY

## 2021-01-01 PROCEDURE — 85379 FIBRIN DEGRADATION QUANT: CPT

## 2021-01-01 PROCEDURE — 85520 HEPARIN ASSAY: CPT

## 2021-01-01 PROCEDURE — 99291 CRITICAL CARE FIRST HOUR: CPT

## 2021-01-01 PROCEDURE — 700101 HCHG RX REV CODE 250: Performed by: ANESTHESIOLOGY

## 2021-01-01 PROCEDURE — 87040 BLOOD CULTURE FOR BACTERIA: CPT

## 2021-01-01 PROCEDURE — 96376 TX/PRO/DX INJ SAME DRUG ADON: CPT

## 2021-01-01 PROCEDURE — 82533 TOTAL CORTISOL: CPT | Mod: 91

## 2021-01-01 PROCEDURE — 36556 INSERT NON-TUNNEL CV CATH: CPT | Mod: RT | Performed by: INTERNAL MEDICINE

## 2021-01-01 PROCEDURE — 94799 UNLISTED PULMONARY SVC/PX: CPT

## 2021-01-01 PROCEDURE — 84436 ASSAY OF TOTAL THYROXINE: CPT

## 2021-01-01 PROCEDURE — 86039 ANTINUCLEAR ANTIBODIES (ANA): CPT

## 2021-01-01 PROCEDURE — 74177 CT ABD & PELVIS W/CONTRAST: CPT

## 2021-01-01 PROCEDURE — 96367 TX/PROPH/DG ADDL SEQ IV INF: CPT

## 2021-01-01 PROCEDURE — A9270 NON-COVERED ITEM OR SERVICE: HCPCS | Performed by: ANESTHESIOLOGY

## 2021-01-01 PROCEDURE — 72131 CT LUMBAR SPINE W/O DYE: CPT

## 2021-01-01 PROCEDURE — 99226 PR SUBSEQUENT OBSERVATION CARE,LEVEL III: CPT | Performed by: STUDENT IN AN ORGANIZED HEALTH CARE EDUCATION/TRAINING PROGRAM

## 2021-01-01 PROCEDURE — 99223 1ST HOSP IP/OBS HIGH 75: CPT | Performed by: INTERNAL MEDICINE

## 2021-01-01 PROCEDURE — 85613 RUSSELL VIPER VENOM DILUTED: CPT

## 2021-01-01 PROCEDURE — 93005 ELECTROCARDIOGRAM TRACING: CPT | Performed by: INTERNAL MEDICINE

## 2021-01-01 PROCEDURE — 97530 THERAPEUTIC ACTIVITIES: CPT

## 2021-01-01 PROCEDURE — 99223 1ST HOSP IP/OBS HIGH 75: CPT | Mod: AI | Performed by: STUDENT IN AN ORGANIZED HEALTH CARE EDUCATION/TRAINING PROGRAM

## 2021-01-01 PROCEDURE — 99291 CRITICAL CARE FIRST HOUR: CPT | Performed by: INTERNAL MEDICINE

## 2021-01-01 PROCEDURE — 84295 ASSAY OF SERUM SODIUM: CPT

## 2021-01-01 PROCEDURE — 97112 NEUROMUSCULAR REEDUCATION: CPT

## 2021-01-01 PROCEDURE — 500075 HCHG BLADE, CLIPPER NEURO: Performed by: NEUROLOGICAL SURGERY

## 2021-01-01 PROCEDURE — 80048 BASIC METABOLIC PNL TOTAL CA: CPT | Mod: 91

## 2021-01-01 PROCEDURE — 36430 TRANSFUSION BLD/BLD COMPNT: CPT

## 2021-01-01 PROCEDURE — 99232 SBSQ HOSP IP/OBS MODERATE 35: CPT | Performed by: HOSPITALIST

## 2021-01-01 PROCEDURE — 302043 TAPE, HYPAFIX: Performed by: INTERNAL MEDICINE

## 2021-01-01 PROCEDURE — 85347 COAGULATION TIME ACTIVATED: CPT | Mod: 91

## 2021-01-01 PROCEDURE — 700101 HCHG RX REV CODE 250: Performed by: NURSE PRACTITIONER

## 2021-01-01 PROCEDURE — 93970 EXTREMITY STUDY: CPT

## 2021-01-01 PROCEDURE — 97110 THERAPEUTIC EXERCISES: CPT | Mod: CQ

## 2021-01-01 PROCEDURE — P9034 PLATELETS, PHERESIS: HCPCS

## 2021-01-01 PROCEDURE — 70498 CT ANGIOGRAPHY NECK: CPT

## 2021-01-01 PROCEDURE — 770001 HCHG ROOM/CARE - MED/SURG/GYN PRIV*

## 2021-01-01 PROCEDURE — 99233 SBSQ HOSP IP/OBS HIGH 50: CPT | Performed by: STUDENT IN AN ORGANIZED HEALTH CARE EDUCATION/TRAINING PROGRAM

## 2021-01-01 PROCEDURE — 160002 HCHG RECOVERY MINUTES (STAT): Performed by: NEUROLOGICAL SURGERY

## 2021-01-01 PROCEDURE — 700117 HCHG RX CONTRAST REV CODE 255: Performed by: STUDENT IN AN ORGANIZED HEALTH CARE EDUCATION/TRAINING PROGRAM

## 2021-01-01 PROCEDURE — 110454 HCHG SHELL REV 250: Performed by: NEUROLOGICAL SURGERY

## 2021-01-01 PROCEDURE — 51702 INSERT TEMP BLADDER CATH: CPT

## 2021-01-01 PROCEDURE — 93312 ECHO TRANSESOPHAGEAL: CPT | Mod: 26 | Performed by: INTERNAL MEDICINE

## 2021-01-01 PROCEDURE — 160009 HCHG ANES TIME/MIN: Performed by: NEUROLOGICAL SURGERY

## 2021-01-01 PROCEDURE — 94003 VENT MGMT INPAT SUBQ DAY: CPT

## 2021-01-01 PROCEDURE — 97166 OT EVAL MOD COMPLEX 45 MIN: CPT

## 2021-01-01 PROCEDURE — 99233 SBSQ HOSP IP/OBS HIGH 50: CPT | Mod: GC | Performed by: INTERNAL MEDICINE

## 2021-01-01 PROCEDURE — 700105 HCHG RX REV CODE 258: Performed by: HOSPITALIST

## 2021-01-01 PROCEDURE — 700111 HCHG RX REV CODE 636 W/ 250 OVERRIDE (IP): Performed by: EMERGENCY MEDICINE

## 2021-01-01 PROCEDURE — 93971 EXTREMITY STUDY: CPT | Mod: RT

## 2021-01-01 PROCEDURE — 83036 HEMOGLOBIN GLYCOSYLATED A1C: CPT

## 2021-01-01 PROCEDURE — 86900 BLOOD TYPING SEROLOGIC ABO: CPT

## 2021-01-01 PROCEDURE — 82550 ASSAY OF CK (CPK): CPT

## 2021-01-01 PROCEDURE — 73701 CT LOWER EXTREMITY W/DYE: CPT | Mod: RT,MF

## 2021-01-01 PROCEDURE — 99211 OFF/OP EST MAY X REQ PHY/QHP: CPT | Performed by: PSYCHIATRY & NEUROLOGY

## 2021-01-01 PROCEDURE — 82803 BLOOD GASES ANY COMBINATION: CPT

## 2021-01-01 PROCEDURE — 700111 HCHG RX REV CODE 636 W/ 250 OVERRIDE (IP): Performed by: RADIOLOGY

## 2021-01-01 PROCEDURE — 96372 THER/PROPH/DIAG INJ SC/IM: CPT

## 2021-01-01 PROCEDURE — 99285 EMERGENCY DEPT VISIT HI MDM: CPT

## 2021-01-01 PROCEDURE — 86140 C-REACTIVE PROTEIN: CPT

## 2021-01-01 PROCEDURE — 96375 TX/PRO/DX INJ NEW DRUG ADDON: CPT

## 2021-01-01 PROCEDURE — C1781 MESH (IMPLANTABLE): HCPCS | Performed by: NEUROLOGICAL SURGERY

## 2021-01-01 PROCEDURE — 84155 ASSAY OF PROTEIN SERUM: CPT

## 2021-01-01 PROCEDURE — 99232 SBSQ HOSP IP/OBS MODERATE 35: CPT | Mod: GC | Performed by: EMERGENCY MEDICINE

## 2021-01-01 PROCEDURE — 99219 PR INITIAL OBSERVATION CARE,LEVL II: CPT | Performed by: INTERNAL MEDICINE

## 2021-01-01 PROCEDURE — 80074 ACUTE HEPATITIS PANEL: CPT

## 2021-01-01 PROCEDURE — 70450 CT HEAD/BRAIN W/O DYE: CPT | Mod: MG

## 2021-01-01 PROCEDURE — 85049 AUTOMATED PLATELET COUNT: CPT

## 2021-01-01 PROCEDURE — 84439 ASSAY OF FREE THYROXINE: CPT

## 2021-01-01 PROCEDURE — 700101 HCHG RX REV CODE 250: Performed by: NEUROLOGICAL SURGERY

## 2021-01-01 PROCEDURE — 99153 MOD SED SAME PHYS/QHP EA: CPT

## 2021-01-01 PROCEDURE — B3151ZZ FLUOROSCOPY OF BILATERAL COMMON CAROTID ARTERIES USING LOW OSMOLAR CONTRAST: ICD-10-PCS | Performed by: RADIOLOGY

## 2021-01-01 PROCEDURE — 86038 ANTINUCLEAR ANTIBODIES: CPT

## 2021-01-01 PROCEDURE — 99222 1ST HOSP IP/OBS MODERATE 55: CPT | Performed by: HOSPITALIST

## 2021-01-01 PROCEDURE — 97535 SELF CARE MNGMENT TRAINING: CPT

## 2021-01-01 PROCEDURE — 0240U HCHG SARS-COV-2 COVID-19 NFCT DS RESP RNA 3 TRGT MIC: CPT

## 2021-01-01 PROCEDURE — 82607 VITAMIN B-12: CPT

## 2021-01-01 PROCEDURE — 700111 HCHG RX REV CODE 636 W/ 250 OVERRIDE (IP): Mod: JG | Performed by: INTERNAL MEDICINE

## 2021-01-01 PROCEDURE — 87641 MR-STAPH DNA AMP PROBE: CPT

## 2021-01-01 PROCEDURE — 500889 HCHG PACK, NEURO: Performed by: NEUROLOGICAL SURGERY

## 2021-01-01 PROCEDURE — 92507 TX SP LANG VOICE COMM INDIV: CPT

## 2021-01-01 PROCEDURE — 5A1945Z RESPIRATORY VENTILATION, 24-96 CONSECUTIVE HOURS: ICD-10-PCS | Performed by: EMERGENCY MEDICINE

## 2021-01-01 PROCEDURE — 87086 URINE CULTURE/COLONY COUNT: CPT

## 2021-01-01 PROCEDURE — 160036 HCHG PACU - EA ADDL 30 MINS PHASE I: Performed by: NEUROLOGICAL SURGERY

## 2021-01-01 PROCEDURE — 82024 ASSAY OF ACTH: CPT

## 2021-01-01 PROCEDURE — 97116 GAIT TRAINING THERAPY: CPT | Mod: CQ

## 2021-01-01 PROCEDURE — 3E033XZ INTRODUCTION OF VASOPRESSOR INTO PERIPHERAL VEIN, PERCUTANEOUS APPROACH: ICD-10-PCS | Performed by: EMERGENCY MEDICINE

## 2021-01-01 PROCEDURE — 84300 ASSAY OF URINE SODIUM: CPT

## 2021-01-01 PROCEDURE — B31B1ZZ FLUOROSCOPY OF LEFT EXTERNAL CAROTID ARTERY USING LOW OSMOLAR CONTRAST: ICD-10-PCS | Performed by: RADIOLOGY

## 2021-01-01 PROCEDURE — 303105 HCHG CATHETER EXTRA

## 2021-01-01 PROCEDURE — 02HV33Z INSERTION OF INFUSION DEVICE INTO SUPERIOR VENA CAVA, PERCUTANEOUS APPROACH: ICD-10-PCS | Performed by: INTERNAL MEDICINE

## 2021-01-01 PROCEDURE — 83605 ASSAY OF LACTIC ACID: CPT | Mod: 91

## 2021-01-01 PROCEDURE — 501838 HCHG SUTURE GENERAL: Performed by: NEUROLOGICAL SURGERY

## 2021-01-01 PROCEDURE — 30233R1 TRANSFUSION OF NONAUTOLOGOUS PLATELETS INTO PERIPHERAL VEIN, PERCUTANEOUS APPROACH: ICD-10-PCS | Performed by: INTERNAL MEDICINE

## 2021-01-01 PROCEDURE — 700117 HCHG RX CONTRAST REV CODE 255: Performed by: NEUROLOGICAL SURGERY

## 2021-01-01 PROCEDURE — 96368 THER/DIAG CONCURRENT INF: CPT

## 2021-01-01 PROCEDURE — 99292 CRITICAL CARE ADDL 30 MIN: CPT | Performed by: STUDENT IN AN ORGANIZED HEALTH CARE EDUCATION/TRAINING PROGRAM

## 2021-01-01 PROCEDURE — 87040 BLOOD CULTURE FOR BACTERIA: CPT | Mod: 91

## 2021-01-01 PROCEDURE — 160048 HCHG OR STATISTICAL LEVEL 1-5: Performed by: NEUROLOGICAL SURGERY

## 2021-01-01 PROCEDURE — 86225 DNA ANTIBODY NATIVE: CPT

## 2021-01-01 PROCEDURE — 87181 SC STD AGAR DILUTION PER AGT: CPT

## 2021-01-01 PROCEDURE — 85652 RBC SED RATE AUTOMATED: CPT

## 2021-01-01 PROCEDURE — 700102 HCHG RX REV CODE 250 W/ 637 OVERRIDE(OP): Performed by: ANESTHESIOLOGY

## 2021-01-01 PROCEDURE — 84443 ASSAY THYROID STIM HORMONE: CPT

## 2021-01-01 PROCEDURE — 05HY33Z INSERTION OF INFUSION DEVICE INTO UPPER VEIN, PERCUTANEOUS APPROACH: ICD-10-PCS | Performed by: STUDENT IN AN ORGANIZED HEALTH CARE EDUCATION/TRAINING PROGRAM

## 2021-01-01 PROCEDURE — 700117 HCHG RX CONTRAST REV CODE 255: Performed by: INTERNAL MEDICINE

## 2021-01-01 PROCEDURE — 92523 SPEECH SOUND LANG COMPREHEN: CPT

## 2021-01-01 PROCEDURE — 0BH18EZ INSERTION OF ENDOTRACHEAL AIRWAY INTO TRACHEA, VIA NATURAL OR ARTIFICIAL OPENING ENDOSCOPIC: ICD-10-PCS | Performed by: EMERGENCY MEDICINE

## 2021-01-01 PROCEDURE — C1713 ANCHOR/SCREW BN/BN,TIS/BN: HCPCS | Performed by: NEUROLOGICAL SURGERY

## 2021-01-01 PROCEDURE — 302214 INTUBATION BOX: Performed by: STUDENT IN AN ORGANIZED HEALTH CARE EDUCATION/TRAINING PROGRAM

## 2021-01-01 PROCEDURE — 76937 US GUIDE VASCULAR ACCESS: CPT

## 2021-01-01 PROCEDURE — 31500 INSERT EMERGENCY AIRWAY: CPT

## 2021-01-01 PROCEDURE — 00C40ZZ EXTIRPATION OF MATTER FROM INTRACRANIAL SUBDURAL SPACE, OPEN APPROACH: ICD-10-PCS | Performed by: NEUROLOGICAL SURGERY

## 2021-01-01 PROCEDURE — 85576 BLOOD PLATELET AGGREGATION: CPT

## 2021-01-01 PROCEDURE — 160041 HCHG SURGERY MINUTES - EA ADDL 1 MIN LEVEL 4: Performed by: NEUROLOGICAL SURGERY

## 2021-01-01 PROCEDURE — 94660 CPAP INITIATION&MGMT: CPT

## 2021-01-01 PROCEDURE — 99215 OFFICE O/P EST HI 40 MIN: CPT | Performed by: INTERNAL MEDICINE

## 2021-01-01 PROCEDURE — 700105 HCHG RX REV CODE 258: Performed by: ANESTHESIOLOGY

## 2021-01-01 PROCEDURE — 84165 PROTEIN E-PHORESIS SERUM: CPT

## 2021-01-01 PROCEDURE — 700101 HCHG RX REV CODE 250: Performed by: EMERGENCY MEDICINE

## 2021-01-01 PROCEDURE — 93005 ELECTROCARDIOGRAM TRACING: CPT

## 2021-01-01 PROCEDURE — 87389 HIV-1 AG W/HIV-1&-2 AB AG IA: CPT

## 2021-01-01 PROCEDURE — 70496 CT ANGIOGRAPHY HEAD: CPT

## 2021-01-01 PROCEDURE — 87186 SC STD MICRODIL/AGAR DIL: CPT

## 2021-01-01 PROCEDURE — 85018 HEMOGLOBIN: CPT

## 2021-01-01 PROCEDURE — 92950 HEART/LUNG RESUSCITATION CPR: CPT

## 2021-01-01 PROCEDURE — 85520 HEPARIN ASSAY: CPT | Mod: 91

## 2021-01-01 PROCEDURE — 83615 LACTATE (LD) (LDH) ENZYME: CPT

## 2021-01-01 PROCEDURE — C9132 KCENTRA, PER I.U.: HCPCS | Mod: JG | Performed by: EMERGENCY MEDICINE

## 2021-01-01 PROCEDURE — 76775 US EXAM ABDO BACK WALL LIM: CPT

## 2021-01-01 PROCEDURE — 87045 FECES CULTURE AEROBIC BACT: CPT

## 2021-01-01 PROCEDURE — 93325 DOPPLER ECHO COLOR FLOW MAPG: CPT

## 2021-01-01 PROCEDURE — 160035 HCHG PACU - 1ST 60 MINS PHASE I: Performed by: NEUROLOGICAL SURGERY

## 2021-01-01 PROCEDURE — 93005 ELECTROCARDIOGRAM TRACING: CPT | Performed by: STUDENT IN AN ORGANIZED HEALTH CARE EDUCATION/TRAINING PROGRAM

## 2021-01-01 PROCEDURE — 99291 CRITICAL CARE FIRST HOUR: CPT | Performed by: STUDENT IN AN ORGANIZED HEALTH CARE EDUCATION/TRAINING PROGRAM

## 2021-01-01 PROCEDURE — 86235 NUCLEAR ANTIGEN ANTIBODY: CPT | Mod: 91

## 2021-01-01 PROCEDURE — 0W310ZZ CONTROL BLEEDING IN CRANIAL CAVITY, OPEN APPROACH: ICD-10-PCS | Performed by: NEUROLOGICAL SURGERY

## 2021-01-01 PROCEDURE — 307059 PAD,EAR PROTECTOR: Performed by: HOSPITALIST

## 2021-01-01 PROCEDURE — 93005 ELECTROCARDIOGRAM TRACING: CPT | Performed by: NURSE PRACTITIONER

## 2021-01-01 PROCEDURE — 97602 WOUND(S) CARE NON-SELECTIVE: CPT

## 2021-01-01 PROCEDURE — 99239 HOSP IP/OBS DSCHRG MGMT >30: CPT | Mod: GC | Performed by: HOSPITALIST

## 2021-01-01 PROCEDURE — B31R1ZZ FLUOROSCOPY OF INTRACRANIAL ARTERIES USING LOW OSMOLAR CONTRAST: ICD-10-PCS | Performed by: RADIOLOGY

## 2021-01-01 PROCEDURE — 97161 PT EVAL LOW COMPLEX 20 MIN: CPT

## 2021-01-01 PROCEDURE — 80202 ASSAY OF VANCOMYCIN: CPT

## 2021-01-01 PROCEDURE — 82746 ASSAY OF FOLIC ACID SERUM: CPT

## 2021-01-01 PROCEDURE — 99204 OFFICE O/P NEW MOD 45 MIN: CPT | Performed by: PSYCHIATRY & NEUROLOGY

## 2021-01-01 DEVICE — GRAFT DURAMATRIX ONLAY PLUS 3IN X 3IN OR 7.5CM X 7.5CM: Type: IMPLANTABLE DEVICE | Site: CRANIAL | Status: FUNCTIONAL

## 2021-01-01 DEVICE — SCREW STRYK NC 1.5X5MM (6NCX40=240) (80EA/PK) CONSIGNED QTY 240 PRE-LOAD: Type: IMPLANTABLE DEVICE | Site: CRANIAL | Status: FUNCTIONAL

## 2021-01-01 DEVICE — PLATE NC BURR HOLE COVER FOR SHUNT 14MM (6NCX6=36): Type: IMPLANTABLE DEVICE | Site: CRANIAL | Status: FUNCTIONAL

## 2021-01-01 DEVICE — DUREPAIR 3X3: Type: IMPLANTABLE DEVICE | Site: CRANIAL | Status: FUNCTIONAL

## 2021-01-01 DEVICE — PLATE NC BOX 2X2 SMALL (6NCX4=24): Type: IMPLANTABLE DEVICE | Site: CRANIAL | Status: FUNCTIONAL

## 2021-01-01 RX ORDER — LACOSAMIDE 100 MG/1
100 TABLET ORAL 2 TIMES DAILY
Status: DISCONTINUED | OUTPATIENT
Start: 2021-01-01 | End: 2021-01-01

## 2021-01-01 RX ORDER — DEXTROSE MONOHYDRATE 25 G/50ML
50 INJECTION, SOLUTION INTRAVENOUS
Status: DISCONTINUED | OUTPATIENT
Start: 2021-01-01 | End: 2021-01-01 | Stop reason: HOSPADM

## 2021-01-01 RX ORDER — LEVETIRACETAM 100 MG/ML
500 SOLUTION ORAL EVERY 12 HOURS
Status: DISCONTINUED | OUTPATIENT
Start: 2021-01-01 | End: 2021-01-01

## 2021-01-01 RX ORDER — DEXAMETHASONE 1 MG
1 TABLET ORAL DAILY
Status: DISCONTINUED | OUTPATIENT
Start: 2021-01-01 | End: 2021-01-01

## 2021-01-01 RX ORDER — HYDROCORTISONE 20 MG/1
20 TABLET ORAL DAILY
Status: DISCONTINUED | OUTPATIENT
Start: 2021-01-01 | End: 2021-01-01 | Stop reason: HOSPADM

## 2021-01-01 RX ORDER — IPRATROPIUM BROMIDE AND ALBUTEROL SULFATE 2.5; .5 MG/3ML; MG/3ML
3 SOLUTION RESPIRATORY (INHALATION)
Status: DISCONTINUED | OUTPATIENT
Start: 2021-01-01 | End: 2021-01-01

## 2021-01-01 RX ORDER — ACETAMINOPHEN 500 MG
1000 TABLET ORAL EVERY 6 HOURS
Status: DISCONTINUED | OUTPATIENT
Start: 2021-01-01 | End: 2021-01-01

## 2021-01-01 RX ORDER — BISACODYL 10 MG
10 SUPPOSITORY, RECTAL RECTAL
Status: DISCONTINUED | OUTPATIENT
Start: 2021-01-01 | End: 2021-01-01 | Stop reason: HOSPADM

## 2021-01-01 RX ORDER — DEXTROSE MONOHYDRATE 25 G/50ML
50 INJECTION, SOLUTION INTRAVENOUS
Status: DISCONTINUED | OUTPATIENT
Start: 2021-01-01 | End: 2021-01-01

## 2021-01-01 RX ORDER — DEXAMETHASONE 4 MG/1
4 TABLET ORAL EVERY 8 HOURS
Status: DISCONTINUED | OUTPATIENT
Start: 2021-01-01 | End: 2021-01-01

## 2021-01-01 RX ORDER — LORAZEPAM 2 MG/ML
2 INJECTION INTRAMUSCULAR
Status: DISCONTINUED | OUTPATIENT
Start: 2021-01-01 | End: 2021-01-01 | Stop reason: HOSPADM

## 2021-01-01 RX ORDER — MAGNESIUM SULFATE HEPTAHYDRATE 40 MG/ML
2 INJECTION, SOLUTION INTRAVENOUS ONCE
Status: COMPLETED | OUTPATIENT
Start: 2021-01-01 | End: 2021-01-01

## 2021-01-01 RX ORDER — MORPHINE SULFATE 4 MG/ML
2-4 INJECTION, SOLUTION INTRAMUSCULAR; INTRAVENOUS
Status: DISCONTINUED | OUTPATIENT
Start: 2021-01-01 | End: 2021-01-01

## 2021-01-01 RX ORDER — ONDANSETRON 4 MG/1
4 TABLET, ORALLY DISINTEGRATING ORAL EVERY 4 HOURS PRN
Status: DISCONTINUED | OUTPATIENT
Start: 2021-01-01 | End: 2021-01-01 | Stop reason: HOSPADM

## 2021-01-01 RX ORDER — AMIODARONE HYDROCHLORIDE 200 MG/1
400 TABLET ORAL TWICE DAILY
Status: DISCONTINUED | OUTPATIENT
Start: 2021-01-01 | End: 2021-01-01

## 2021-01-01 RX ORDER — AMOXICILLIN 250 MG
2 CAPSULE ORAL 2 TIMES DAILY
Status: DISCONTINUED | OUTPATIENT
Start: 2021-01-01 | End: 2021-01-01

## 2021-01-01 RX ORDER — CIPROFLOXACIN 250 MG/1
250 TABLET, FILM COATED ORAL EVERY 12 HOURS
Qty: 4 TABLET | Refills: 0 | Status: SHIPPED
Start: 2021-01-01 | End: 2021-01-01

## 2021-01-01 RX ORDER — PROCHLORPERAZINE EDISYLATE 5 MG/ML
10 INJECTION INTRAMUSCULAR; INTRAVENOUS EVERY 6 HOURS PRN
Status: DISCONTINUED | OUTPATIENT
Start: 2021-01-01 | End: 2021-01-01 | Stop reason: HOSPADM

## 2021-01-01 RX ORDER — SODIUM CHLORIDE 9 MG/ML
1000 INJECTION, SOLUTION INTRAVENOUS ONCE
Status: COMPLETED | OUTPATIENT
Start: 2021-01-01 | End: 2021-01-01

## 2021-01-01 RX ORDER — DEXAMETHASONE 4 MG/1
4 TABLET ORAL EVERY 6 HOURS
Status: COMPLETED | OUTPATIENT
Start: 2021-01-01 | End: 2021-01-01

## 2021-01-01 RX ORDER — DILTIAZEM HYDROCHLORIDE 5 MG/ML
25 INJECTION INTRAVENOUS ONCE
Status: COMPLETED | OUTPATIENT
Start: 2021-01-01 | End: 2021-01-01

## 2021-01-01 RX ORDER — OXYCODONE HYDROCHLORIDE 10 MG/1
10 TABLET ORAL EVERY 4 HOURS PRN
Status: DISCONTINUED | OUTPATIENT
Start: 2021-01-01 | End: 2021-01-01 | Stop reason: HOSPADM

## 2021-01-01 RX ORDER — ASPIRIN 81 MG/1
TABLET, COATED ORAL
COMMUNITY
Start: 2021-01-01 | End: 2021-01-01

## 2021-01-01 RX ORDER — NOREPINEPHRINE BITARTRATE 1 MG/ML
INJECTION, SOLUTION INTRAVENOUS
Status: ACTIVE
Start: 2021-01-01 | End: 2021-01-01

## 2021-01-01 RX ORDER — BISACODYL 10 MG
10 SUPPOSITORY, RECTAL RECTAL
Status: DISCONTINUED | OUTPATIENT
Start: 2021-01-01 | End: 2021-01-01

## 2021-01-01 RX ORDER — ONDANSETRON 2 MG/ML
INJECTION INTRAMUSCULAR; INTRAVENOUS PRN
Status: DISCONTINUED | OUTPATIENT
Start: 2021-01-01 | End: 2021-01-01 | Stop reason: SURG

## 2021-01-01 RX ORDER — OXYCODONE HYDROCHLORIDE 10 MG/1
10 TABLET ORAL EVERY 4 HOURS PRN
Qty: 18 TABLET | Refills: 0 | Status: SHIPPED | OUTPATIENT
Start: 2021-01-01 | End: 2021-01-01

## 2021-01-01 RX ORDER — SODIUM CHLORIDE, SODIUM LACTATE, POTASSIUM CHLORIDE, AND CALCIUM CHLORIDE .6; .31; .03; .02 G/100ML; G/100ML; G/100ML; G/100ML
1000 INJECTION, SOLUTION INTRAVENOUS ONCE
Status: COMPLETED | OUTPATIENT
Start: 2021-01-01 | End: 2021-01-01

## 2021-01-01 RX ORDER — OXYCODONE HYDROCHLORIDE 5 MG/1
5 TABLET ORAL EVERY 4 HOURS PRN
Status: DISCONTINUED | OUTPATIENT
Start: 2021-01-01 | End: 2021-01-01 | Stop reason: HOSPADM

## 2021-01-01 RX ORDER — OXYCODONE HYDROCHLORIDE 5 MG/1
5-10 TABLET ORAL
Status: DISCONTINUED | OUTPATIENT
Start: 2021-01-01 | End: 2021-01-01 | Stop reason: HOSPADM

## 2021-01-01 RX ORDER — AMIODARONE HYDROCHLORIDE 400 MG/1
400 TABLET ORAL 2 TIMES DAILY
Qty: 8 TABLET | Refills: 0 | Status: SHIPPED | OUTPATIENT
Start: 2021-01-01 | End: 2021-01-01

## 2021-01-01 RX ORDER — AMOXICILLIN 250 MG
2 CAPSULE ORAL 2 TIMES DAILY
Qty: 30 TABLET | Refills: 0 | Status: SHIPPED | OUTPATIENT
Start: 2021-01-01 | End: 2021-01-01

## 2021-01-01 RX ORDER — FAMOTIDINE 20 MG/1
20 TABLET, FILM COATED ORAL DAILY
Status: DISCONTINUED | OUTPATIENT
Start: 2021-01-01 | End: 2021-01-01

## 2021-01-01 RX ORDER — AMIODARONE HYDROCHLORIDE 200 MG/1
200 TABLET ORAL DAILY
Status: DISCONTINUED | OUTPATIENT
Start: 2021-01-01 | End: 2021-01-01 | Stop reason: HOSPADM

## 2021-01-01 RX ORDER — MORPHINE SULFATE 4 MG/ML
2 INJECTION, SOLUTION INTRAMUSCULAR; INTRAVENOUS
Status: DISCONTINUED | OUTPATIENT
Start: 2021-01-01 | End: 2021-01-01

## 2021-01-01 RX ORDER — POLYETHYLENE GLYCOL 3350 17 G/17G
1 POWDER, FOR SOLUTION ORAL
Status: DISCONTINUED | OUTPATIENT
Start: 2021-01-01 | End: 2021-01-01

## 2021-01-01 RX ORDER — POTASSIUM CHLORIDE 20 MEQ/1
40 TABLET, EXTENDED RELEASE ORAL ONCE
Status: COMPLETED | OUTPATIENT
Start: 2021-01-01 | End: 2021-01-01

## 2021-01-01 RX ORDER — TAMSULOSIN HYDROCHLORIDE 0.4 MG/1
0.4 CAPSULE ORAL EVERY EVENING
Status: DISCONTINUED | OUTPATIENT
Start: 2021-01-01 | End: 2021-01-01 | Stop reason: HOSPADM

## 2021-01-01 RX ORDER — ROCURONIUM BROMIDE 10 MG/ML
INJECTION, SOLUTION INTRAVENOUS PRN
Status: DISCONTINUED | OUTPATIENT
Start: 2021-01-01 | End: 2021-01-01 | Stop reason: SURG

## 2021-01-01 RX ORDER — ACETAMINOPHEN 325 MG/1
650 TABLET ORAL EVERY 6 HOURS PRN
Status: DISCONTINUED | OUTPATIENT
Start: 2021-01-01 | End: 2021-01-01

## 2021-01-01 RX ORDER — ONDANSETRON 2 MG/ML
4 INJECTION INTRAMUSCULAR; INTRAVENOUS EVERY 4 HOURS PRN
Status: DISCONTINUED | OUTPATIENT
Start: 2021-01-01 | End: 2021-01-01

## 2021-01-01 RX ORDER — SODIUM CHLORIDE 1 G/1
2 TABLET ORAL
Qty: 90 TABLET | Refills: 0 | Status: SHIPPED | OUTPATIENT
Start: 2021-01-01 | End: 2021-01-01

## 2021-01-01 RX ORDER — HYDROCORTISONE 10 MG/1
10 TABLET ORAL EVERY EVENING
Status: DISCONTINUED | OUTPATIENT
Start: 2021-01-01 | End: 2021-01-01 | Stop reason: HOSPADM

## 2021-01-01 RX ORDER — DEXAMETHASONE 1 MG
3 TABLET ORAL EVERY 8 HOURS
Status: DISCONTINUED | OUTPATIENT
Start: 2021-01-01 | End: 2021-01-01

## 2021-01-01 RX ORDER — FUROSEMIDE 10 MG/ML
40 INJECTION INTRAMUSCULAR; INTRAVENOUS
Status: COMPLETED | OUTPATIENT
Start: 2021-01-01 | End: 2021-01-01

## 2021-01-01 RX ORDER — HEPARIN SODIUM 5000 [USP'U]/ML
5000 INJECTION, SOLUTION INTRAVENOUS; SUBCUTANEOUS EVERY 8 HOURS
Status: DISCONTINUED | OUTPATIENT
Start: 2021-01-01 | End: 2021-01-01

## 2021-01-01 RX ORDER — ACETAMINOPHEN 325 MG/1
650 TABLET ORAL EVERY 4 HOURS PRN
Status: DISCONTINUED | OUTPATIENT
Start: 2021-01-01 | End: 2021-01-01

## 2021-01-01 RX ORDER — SODIUM CHLORIDE 9 MG/ML
500 INJECTION, SOLUTION INTRAVENOUS ONCE
Status: COMPLETED | OUTPATIENT
Start: 2021-01-01 | End: 2021-01-01

## 2021-01-01 RX ORDER — INSULIN GLARGINE 100 [IU]/ML
15 INJECTION, SOLUTION SUBCUTANEOUS
Status: DISCONTINUED | OUTPATIENT
Start: 2021-01-01 | End: 2021-01-01

## 2021-01-01 RX ORDER — DIPHENHYDRAMINE HYDROCHLORIDE 50 MG/ML
12.5 INJECTION INTRAMUSCULAR; INTRAVENOUS
Status: DISCONTINUED | OUTPATIENT
Start: 2021-01-01 | End: 2021-01-01 | Stop reason: HOSPADM

## 2021-01-01 RX ORDER — HYDROCORTISONE 20 MG/1
20 TABLET ORAL DAILY
Qty: 30 TABLET | Refills: 0 | Status: SHIPPED | OUTPATIENT
Start: 2021-01-01 | End: 2021-01-01

## 2021-01-01 RX ORDER — POLYETHYLENE GLYCOL 3350 17 G/17G
1 POWDER, FOR SOLUTION ORAL
Status: DISCONTINUED | OUTPATIENT
Start: 2021-01-01 | End: 2021-01-01 | Stop reason: HOSPADM

## 2021-01-01 RX ORDER — SODIUM CHLORIDE 9 MG/ML
500 INJECTION, SOLUTION INTRAVENOUS CONTINUOUS
Status: DISCONTINUED | OUTPATIENT
Start: 2021-01-01 | End: 2021-01-01

## 2021-01-01 RX ORDER — LISINOPRIL 5 MG/1
2.5 TABLET ORAL
Status: DISCONTINUED | OUTPATIENT
Start: 2021-01-01 | End: 2021-01-01

## 2021-01-01 RX ORDER — HYDROMORPHONE HYDROCHLORIDE 1 MG/ML
0.4 INJECTION, SOLUTION INTRAMUSCULAR; INTRAVENOUS; SUBCUTANEOUS
Status: DISCONTINUED | OUTPATIENT
Start: 2021-01-01 | End: 2021-01-01 | Stop reason: HOSPADM

## 2021-01-01 RX ORDER — AMOXICILLIN 250 MG
2 CAPSULE ORAL 2 TIMES DAILY
Qty: 30 TABLET | Refills: 0 | Status: ON HOLD | OUTPATIENT
Start: 2021-01-01 | End: 2021-01-01

## 2021-01-01 RX ORDER — ADENOSINE 3 MG/ML
INJECTION, SOLUTION INTRAVENOUS
Status: COMPLETED
Start: 2021-01-01 | End: 2021-01-01

## 2021-01-01 RX ORDER — WARFARIN SODIUM 3 MG/1
3 TABLET ORAL
Status: DISCONTINUED | OUTPATIENT
Start: 2021-01-01 | End: 2021-01-01 | Stop reason: HOSPADM

## 2021-01-01 RX ORDER — POLYETHYLENE GLYCOL 3350 17 G/17G
1 POWDER, FOR SOLUTION ORAL DAILY
Status: DISCONTINUED | OUTPATIENT
Start: 2021-01-01 | End: 2021-01-01

## 2021-01-01 RX ORDER — ALBUTEROL SULFATE 90 UG/1
AEROSOL, METERED RESPIRATORY (INHALATION)
COMMUNITY
End: 2021-01-01

## 2021-01-01 RX ORDER — DEXAMETHASONE 1 MG
2 TABLET ORAL EVERY 12 HOURS
Status: DISCONTINUED | OUTPATIENT
Start: 2021-01-01 | End: 2021-01-01

## 2021-01-01 RX ORDER — SODIUM CHLORIDE, SODIUM LACTATE, POTASSIUM CHLORIDE, CALCIUM CHLORIDE 600; 310; 30; 20 MG/100ML; MG/100ML; MG/100ML; MG/100ML
INJECTION, SOLUTION INTRAVENOUS
Status: DISCONTINUED | OUTPATIENT
Start: 2021-01-01 | End: 2021-01-01 | Stop reason: SURG

## 2021-01-01 RX ORDER — ZINC SULFATE 50(220)MG
220 CAPSULE ORAL DAILY
Qty: 30 CAPSULE | Refills: 3 | Status: CANCELLED | OUTPATIENT
Start: 2021-01-01 | End: 2021-01-01

## 2021-01-01 RX ORDER — DEXAMETHASONE 1 MG
2 TABLET ORAL EVERY 8 HOURS
Status: DISCONTINUED | OUTPATIENT
Start: 2021-01-01 | End: 2021-01-01

## 2021-01-01 RX ORDER — ADENOSINE 3 MG/ML
6 INJECTION, SOLUTION INTRAVENOUS ONCE
Status: COMPLETED | OUTPATIENT
Start: 2021-01-01 | End: 2021-01-01

## 2021-01-01 RX ORDER — FAMOTIDINE 20 MG/1
20 TABLET, FILM COATED ORAL EVERY 12 HOURS
Status: DISCONTINUED | OUTPATIENT
Start: 2021-01-01 | End: 2021-01-01

## 2021-01-01 RX ORDER — DEXAMETHASONE SODIUM PHOSPHATE 4 MG/ML
6 INJECTION, SOLUTION INTRA-ARTICULAR; INTRALESIONAL; INTRAMUSCULAR; INTRAVENOUS; SOFT TISSUE DAILY
Status: DISCONTINUED | OUTPATIENT
Start: 2021-01-01 | End: 2021-01-01

## 2021-01-01 RX ORDER — MORPHINE SULFATE 10 MG/ML
10 INJECTION, SOLUTION INTRAMUSCULAR; INTRAVENOUS
Status: DISCONTINUED | OUTPATIENT
Start: 2021-01-01 | End: 2021-01-01 | Stop reason: HOSPADM

## 2021-01-01 RX ORDER — DEXTROSE MONOHYDRATE 50 MG/ML
INJECTION, SOLUTION INTRAVENOUS CONTINUOUS
Status: DISCONTINUED | OUTPATIENT
Start: 2021-01-01 | End: 2021-01-01

## 2021-01-01 RX ORDER — HEPARIN SODIUM 1000 [USP'U]/ML
40 INJECTION, SOLUTION INTRAVENOUS; SUBCUTANEOUS PRN
Status: DISCONTINUED | OUTPATIENT
Start: 2021-01-01 | End: 2021-01-01

## 2021-01-01 RX ORDER — ASPIRIN 81 MG/1
81 TABLET, CHEWABLE ORAL DAILY
Status: DISCONTINUED | OUTPATIENT
Start: 2021-01-01 | End: 2021-01-01

## 2021-01-01 RX ORDER — AMIODARONE HYDROCHLORIDE 200 MG/1
200 TABLET ORAL DAILY
Status: DISCONTINUED | OUTPATIENT
Start: 2021-01-01 | End: 2021-01-01

## 2021-01-01 RX ORDER — HYDROXYZINE HYDROCHLORIDE 25 MG/1
25 TABLET, FILM COATED ORAL EVERY 8 HOURS PRN
COMMUNITY

## 2021-01-01 RX ORDER — HEPARIN SODIUM 1000 [USP'U]/ML
80 INJECTION, SOLUTION INTRAVENOUS; SUBCUTANEOUS ONCE
Status: COMPLETED | OUTPATIENT
Start: 2021-01-01 | End: 2021-01-01

## 2021-01-01 RX ORDER — METOPROLOL SUCCINATE 50 MG/1
50 TABLET, EXTENDED RELEASE ORAL
Status: DISCONTINUED | OUTPATIENT
Start: 2021-01-01 | End: 2021-01-01 | Stop reason: HOSPADM

## 2021-01-01 RX ORDER — SODIUM CHLORIDE 9 MG/ML
INJECTION, SOLUTION INTRAVENOUS
Status: DISCONTINUED | OUTPATIENT
Start: 2021-01-01 | End: 2021-01-01 | Stop reason: SURG

## 2021-01-01 RX ORDER — POTASSIUM CHLORIDE 20 MEQ/1
10 TABLET, EXTENDED RELEASE ORAL ONCE
Status: COMPLETED | OUTPATIENT
Start: 2021-01-01 | End: 2021-01-01

## 2021-01-01 RX ORDER — LACOSAMIDE 100 MG/1
100 TABLET ORAL 2 TIMES DAILY
Status: DISCONTINUED | OUTPATIENT
Start: 2021-01-01 | End: 2021-01-01 | Stop reason: HOSPADM

## 2021-01-01 RX ORDER — DEXAMETHASONE 4 MG/1
4 TABLET ORAL EVERY 6 HOURS
Status: DISCONTINUED | OUTPATIENT
Start: 2021-01-01 | End: 2021-01-01

## 2021-01-01 RX ORDER — COLCHICINE 0.6 MG/1
0.6 TABLET ORAL 2 TIMES DAILY
Status: COMPLETED | OUTPATIENT
Start: 2021-01-01 | End: 2021-01-01

## 2021-01-01 RX ORDER — TRIAMCINOLONE ACETONIDE 0.25 MG/G
1 CREAM TOPICAL 2 TIMES DAILY
COMMUNITY

## 2021-01-01 RX ORDER — MIDAZOLAM HYDROCHLORIDE 1 MG/ML
2 INJECTION INTRAMUSCULAR; INTRAVENOUS ONCE
Status: COMPLETED | OUTPATIENT
Start: 2021-01-01 | End: 2021-01-01

## 2021-01-01 RX ORDER — LABETALOL HYDROCHLORIDE 5 MG/ML
10 INJECTION, SOLUTION INTRAVENOUS EVERY 6 HOURS PRN
Status: DISCONTINUED | OUTPATIENT
Start: 2021-01-01 | End: 2021-01-01

## 2021-01-01 RX ORDER — ACETAMINOPHEN 325 MG/1
650 TABLET ORAL EVERY 6 HOURS
Status: DISCONTINUED | OUTPATIENT
Start: 2021-01-01 | End: 2021-01-01

## 2021-01-01 RX ORDER — ALLOPURINOL 100 MG/1
100 TABLET ORAL EVERY MORNING
Status: DISCONTINUED | OUTPATIENT
Start: 2021-01-01 | End: 2021-01-01

## 2021-01-01 RX ORDER — SODIUM CHLORIDE 1 G/1
1 TABLET ORAL
Status: DISCONTINUED | OUTPATIENT
Start: 2021-01-01 | End: 2021-01-01

## 2021-01-01 RX ORDER — METOPROLOL TARTRATE 1 MG/ML
5 INJECTION, SOLUTION INTRAVENOUS ONCE
Status: COMPLETED | OUTPATIENT
Start: 2021-01-01 | End: 2021-01-01

## 2021-01-01 RX ORDER — FINASTERIDE 5 MG/1
5 TABLET, FILM COATED ORAL DAILY
COMMUNITY

## 2021-01-01 RX ORDER — LORAZEPAM 2 MG/ML
5-10 INJECTION INTRAMUSCULAR
Status: DISCONTINUED | OUTPATIENT
Start: 2021-01-01 | End: 2021-01-01 | Stop reason: HOSPADM

## 2021-01-01 RX ORDER — ZINC SULFATE 50(220)MG
220 CAPSULE ORAL DAILY
Status: DISCONTINUED | OUTPATIENT
Start: 2021-01-01 | End: 2021-01-01 | Stop reason: HOSPADM

## 2021-01-01 RX ORDER — CETIRIZINE HYDROCHLORIDE 10 MG/1
10 TABLET ORAL DAILY
Status: DISCONTINUED | OUTPATIENT
Start: 2021-01-01 | End: 2021-01-01

## 2021-01-01 RX ORDER — FLUDROCORTISONE ACETATE 0.1 MG/1
0.1 TABLET ORAL EVERY MORNING
Qty: 30 TABLET | Refills: 0 | Status: ON HOLD | OUTPATIENT
Start: 2021-01-01 | End: 2021-01-01

## 2021-01-01 RX ORDER — OMEGA-3S/DHA/EPA/FISH OIL/D3 300MG-1000
800 CAPSULE ORAL DAILY
Qty: 150 TABLET | Refills: 0 | Status: SHIPPED | OUTPATIENT
Start: 2021-01-01 | End: 2021-01-01

## 2021-01-01 RX ORDER — DEXAMETHASONE SODIUM PHOSPHATE 4 MG/ML
4 INJECTION, SOLUTION INTRA-ARTICULAR; INTRALESIONAL; INTRAMUSCULAR; INTRAVENOUS; SOFT TISSUE ONCE
Status: COMPLETED | OUTPATIENT
Start: 2021-01-01 | End: 2021-01-01

## 2021-01-01 RX ORDER — LACOSAMIDE 100 MG/1
100 TABLET ORAL 2 TIMES DAILY
Qty: 60 TABLET | Refills: 0 | Status: SHIPPED | OUTPATIENT
Start: 2021-01-01 | End: 2021-01-01

## 2021-01-01 RX ORDER — HEPARIN SODIUM 5000 [USP'U]/100ML
0-30 INJECTION, SOLUTION INTRAVENOUS CONTINUOUS
Status: DISCONTINUED | OUTPATIENT
Start: 2021-01-01 | End: 2021-01-01

## 2021-01-01 RX ORDER — MORPHINE SULFATE 4 MG/ML
2 INJECTION, SOLUTION INTRAMUSCULAR; INTRAVENOUS EVERY 12 HOURS PRN
Status: DISCONTINUED | OUTPATIENT
Start: 2021-01-01 | End: 2021-01-01

## 2021-01-01 RX ORDER — GUAIFENESIN 600 MG/1
600 TABLET, EXTENDED RELEASE ORAL 2 TIMES DAILY PRN
Status: DISCONTINUED | OUTPATIENT
Start: 2021-01-01 | End: 2021-01-01

## 2021-01-01 RX ORDER — OXYCODONE HYDROCHLORIDE 5 MG/1
5 TABLET ORAL EVERY 6 HOURS PRN
Status: DISCONTINUED | OUTPATIENT
Start: 2021-01-01 | End: 2021-01-01

## 2021-01-01 RX ORDER — OXYCODONE HYDROCHLORIDE 10 MG/1
10 TABLET ORAL EVERY 6 HOURS PRN
Status: DISCONTINUED | OUTPATIENT
Start: 2021-01-01 | End: 2021-01-01 | Stop reason: HOSPADM

## 2021-01-01 RX ORDER — OXYCODONE HYDROCHLORIDE 5 MG/1
5 TABLET ORAL EVERY 6 HOURS PRN
Qty: 15 TABLET | Refills: 0 | Status: SHIPPED | OUTPATIENT
Start: 2021-01-01 | End: 2021-01-01

## 2021-01-01 RX ORDER — LACOSAMIDE 100 MG/1
100 TABLET, FILM COATED ORAL 2 TIMES DAILY
Qty: 60 TABLET | Refills: 5 | Status: SHIPPED | OUTPATIENT
Start: 2021-01-01 | End: 2022-01-08

## 2021-01-01 RX ORDER — SUCCINYLCHOLINE CHLORIDE 20 MG/ML
150 INJECTION INTRAMUSCULAR; INTRAVENOUS ONCE
Status: COMPLETED | OUTPATIENT
Start: 2021-01-01 | End: 2021-01-01

## 2021-01-01 RX ORDER — LACOSAMIDE 100 MG/1
100 TABLET ORAL 2 TIMES DAILY
Qty: 60 TABLET | Refills: 0 | Status: SHIPPED | OUTPATIENT
Start: 2021-01-01 | End: 2021-01-01 | Stop reason: SDUPTHER

## 2021-01-01 RX ORDER — ALLOPURINOL 100 MG/1
100 TABLET ORAL DAILY
COMMUNITY
Start: 2021-01-01

## 2021-01-01 RX ORDER — SODIUM CHLORIDE 1 G/1
2 TABLET ORAL
Status: DISCONTINUED | OUTPATIENT
Start: 2021-01-01 | End: 2021-01-01 | Stop reason: HOSPADM

## 2021-01-01 RX ORDER — LABETALOL HYDROCHLORIDE 5 MG/ML
10 INJECTION, SOLUTION INTRAVENOUS EVERY 4 HOURS PRN
Status: DISCONTINUED | OUTPATIENT
Start: 2021-01-01 | End: 2021-01-01 | Stop reason: HOSPADM

## 2021-01-01 RX ORDER — METOPROLOL SUCCINATE 25 MG/1
25 TABLET, EXTENDED RELEASE ORAL EVERY MORNING
Qty: 30 TABLET | Refills: 0 | Status: SHIPPED | OUTPATIENT
Start: 2021-01-01 | End: 2021-01-01

## 2021-01-01 RX ORDER — LEVETIRACETAM 500 MG/1
500 TABLET ORAL 2 TIMES DAILY
Status: DISCONTINUED | OUTPATIENT
Start: 2021-01-01 | End: 2021-01-01

## 2021-01-01 RX ORDER — ACETAMINOPHEN 325 MG/1
650 TABLET ORAL EVERY 6 HOURS PRN
Status: DISCONTINUED | OUTPATIENT
Start: 2021-01-01 | End: 2021-01-01 | Stop reason: HOSPADM

## 2021-01-01 RX ORDER — AMOXICILLIN 250 MG
2 CAPSULE ORAL 2 TIMES DAILY
Status: DISCONTINUED | OUTPATIENT
Start: 2021-01-01 | End: 2021-01-01 | Stop reason: HOSPADM

## 2021-01-01 RX ORDER — SODIUM CHLORIDE, SODIUM LACTATE, POTASSIUM CHLORIDE, CALCIUM CHLORIDE 600; 310; 30; 20 MG/100ML; MG/100ML; MG/100ML; MG/100ML
INJECTION, SOLUTION INTRAVENOUS CONTINUOUS
Status: DISCONTINUED | OUTPATIENT
Start: 2021-01-01 | End: 2021-01-01 | Stop reason: HOSPADM

## 2021-01-01 RX ORDER — MIDAZOLAM HYDROCHLORIDE 1 MG/ML
INJECTION INTRAMUSCULAR; INTRAVENOUS PRN
Status: DISCONTINUED | OUTPATIENT
Start: 2021-01-01 | End: 2021-01-01 | Stop reason: SURG

## 2021-01-01 RX ORDER — OXYCODONE HYDROCHLORIDE 5 MG/1
5 TABLET ORAL ONCE
Status: COMPLETED | OUTPATIENT
Start: 2021-01-01 | End: 2021-01-01

## 2021-01-01 RX ORDER — OXYCODONE HCL 5 MG/5 ML
5 SOLUTION, ORAL ORAL
Status: COMPLETED | OUTPATIENT
Start: 2021-01-01 | End: 2021-01-01

## 2021-01-01 RX ORDER — DEXAMETHASONE 1 MG
1 TABLET ORAL 3 TIMES DAILY
Status: DISCONTINUED | OUTPATIENT
Start: 2021-01-01 | End: 2021-01-01

## 2021-01-01 RX ORDER — MORPHINE SULFATE 10 MG/ML
5 INJECTION, SOLUTION INTRAMUSCULAR; INTRAVENOUS
Status: DISCONTINUED | OUTPATIENT
Start: 2021-01-01 | End: 2021-01-01 | Stop reason: HOSPADM

## 2021-01-01 RX ORDER — SODIUM CHLORIDE 9 MG/ML
INJECTION, SOLUTION INTRAVENOUS CONTINUOUS
Status: DISCONTINUED | OUTPATIENT
Start: 2021-01-01 | End: 2021-01-01

## 2021-01-01 RX ORDER — TAMSULOSIN HYDROCHLORIDE 0.4 MG/1
0.4 CAPSULE ORAL DAILY
Status: DISCONTINUED | OUTPATIENT
Start: 2021-01-01 | End: 2021-01-01 | Stop reason: HOSPADM

## 2021-01-01 RX ORDER — ACETAMINOPHEN 650 MG/1
650 SUPPOSITORY RECTAL EVERY 4 HOURS PRN
Status: DISCONTINUED | OUTPATIENT
Start: 2021-01-01 | End: 2021-01-01

## 2021-01-01 RX ORDER — LIDOCAINE HYDROCHLORIDE 20 MG/ML
JELLY TOPICAL 2 TIMES DAILY PRN
Status: COMPLETED | OUTPATIENT
Start: 2021-01-01 | End: 2021-01-01

## 2021-01-01 RX ORDER — WARFARIN SODIUM 2 MG/1
2 TABLET ORAL DAILY
Status: DISCONTINUED | OUTPATIENT
Start: 2021-01-01 | End: 2021-01-01

## 2021-01-01 RX ORDER — METRONIDAZOLE 500 MG/1
500 TABLET ORAL EVERY 8 HOURS
Status: DISCONTINUED | OUTPATIENT
Start: 2021-01-01 | End: 2021-01-01

## 2021-01-01 RX ORDER — MORPHINE SULFATE 4 MG/ML
1-2 INJECTION, SOLUTION INTRAMUSCULAR; INTRAVENOUS
Status: DISCONTINUED | OUTPATIENT
Start: 2021-01-01 | End: 2021-01-01

## 2021-01-01 RX ORDER — HYDRALAZINE HYDROCHLORIDE 20 MG/ML
10 INJECTION INTRAMUSCULAR; INTRAVENOUS EVERY 4 HOURS PRN
Status: DISCONTINUED | OUTPATIENT
Start: 2021-01-01 | End: 2021-01-01 | Stop reason: HOSPADM

## 2021-01-01 RX ORDER — SODIUM CHLORIDE 9 MG/ML
INJECTION, SOLUTION INTRAVENOUS CONTINUOUS
Status: ACTIVE | OUTPATIENT
Start: 2021-01-01 | End: 2021-01-01

## 2021-01-01 RX ORDER — ATROPINE SULFATE 10 MG/ML
2 SOLUTION/ DROPS OPHTHALMIC EVERY 4 HOURS PRN
Status: DISCONTINUED | OUTPATIENT
Start: 2021-01-01 | End: 2021-01-01 | Stop reason: HOSPADM

## 2021-01-01 RX ORDER — DEXAMETHASONE 6 MG/1
6 TABLET ORAL DAILY
Status: DISCONTINUED | OUTPATIENT
Start: 2021-09-23 | End: 2021-01-01

## 2021-01-01 RX ORDER — WARFARIN SODIUM 2.5 MG/1
2.5 TABLET ORAL DAILY
Status: DISCONTINUED | OUTPATIENT
Start: 2021-01-01 | End: 2021-01-01

## 2021-01-01 RX ORDER — HYDROCORTISONE 10 MG/1
10 TABLET ORAL ONCE
Status: COMPLETED | OUTPATIENT
Start: 2021-01-01 | End: 2021-01-01

## 2021-01-01 RX ORDER — SODIUM CHLORIDE 1 G/1
2 TABLET ORAL
Qty: 90 TABLET | Refills: 0 | Status: ON HOLD | OUTPATIENT
Start: 2021-01-01 | End: 2021-01-01

## 2021-01-01 RX ORDER — DEXAMETHASONE 1 MG
1 TABLET ORAL EVERY 12 HOURS
Status: DISCONTINUED | OUTPATIENT
Start: 2021-01-01 | End: 2021-01-01

## 2021-01-01 RX ORDER — DEXTROSE MONOHYDRATE 25 G/50ML
25-50 INJECTION, SOLUTION INTRAVENOUS PRN
Status: DISCONTINUED | OUTPATIENT
Start: 2021-01-01 | End: 2021-01-01

## 2021-01-01 RX ORDER — WARFARIN SODIUM 3 MG/1
1.5 TABLET ORAL DAILY
Status: DISCONTINUED | OUTPATIENT
Start: 2021-01-01 | End: 2021-01-01 | Stop reason: HOSPADM

## 2021-01-01 RX ORDER — ACETAMINOPHEN 325 MG/1
325 TABLET ORAL EVERY 6 HOURS PRN
Refills: 0 | Status: SHIPPED
Start: 2021-01-01 | End: 2021-01-01

## 2021-01-01 RX ORDER — FAMOTIDINE 20 MG/1
20 TABLET, FILM COATED ORAL 2 TIMES DAILY
Qty: 8 TABLET | Refills: 0 | Status: SHIPPED | OUTPATIENT
Start: 2021-01-01 | End: 2021-01-01

## 2021-01-01 RX ORDER — AZITHROMYCIN 250 MG/1
500 TABLET, FILM COATED ORAL DAILY
Status: DISCONTINUED | OUTPATIENT
Start: 2021-01-01 | End: 2021-01-01

## 2021-01-01 RX ORDER — AZITHROMYCIN 250 MG/1
500 TABLET, FILM COATED ORAL DAILY
Status: COMPLETED | OUTPATIENT
Start: 2021-01-01 | End: 2021-01-01

## 2021-01-01 RX ORDER — DILTIAZEM HYDROCHLORIDE 5 MG/ML
INJECTION INTRAVENOUS
Status: COMPLETED
Start: 2021-01-01 | End: 2021-01-01

## 2021-01-01 RX ORDER — SODIUM CHLORIDE 9 MG/ML
500 INJECTION, SOLUTION INTRAVENOUS
Status: ACTIVE | OUTPATIENT
Start: 2021-01-01 | End: 2021-01-01

## 2021-01-01 RX ORDER — ONDANSETRON 2 MG/ML
4 INJECTION INTRAMUSCULAR; INTRAVENOUS EVERY 6 HOURS PRN
Status: DISCONTINUED | OUTPATIENT
Start: 2021-01-01 | End: 2021-01-01

## 2021-01-01 RX ORDER — HALOPERIDOL 5 MG/ML
1 INJECTION INTRAMUSCULAR
Status: DISCONTINUED | OUTPATIENT
Start: 2021-01-01 | End: 2021-01-01 | Stop reason: HOSPADM

## 2021-01-01 RX ORDER — OXYCODONE HCL 5 MG/5 ML
10 SOLUTION, ORAL ORAL
Status: COMPLETED | OUTPATIENT
Start: 2021-01-01 | End: 2021-01-01

## 2021-01-01 RX ORDER — FLUDROCORTISONE ACETATE 0.1 MG/1
0.1 TABLET ORAL ONCE
Status: COMPLETED | OUTPATIENT
Start: 2021-01-01 | End: 2021-01-01

## 2021-01-01 RX ORDER — WARFARIN SODIUM 3 MG/1
1.5 TABLET ORAL
Status: COMPLETED | OUTPATIENT
Start: 2021-01-01 | End: 2021-01-01

## 2021-01-01 RX ORDER — ALLOPURINOL 100 MG/1
100 TABLET ORAL EVERY MORNING
Status: DISCONTINUED | OUTPATIENT
Start: 2021-01-01 | End: 2021-01-01 | Stop reason: HOSPADM

## 2021-01-01 RX ORDER — INSULIN GLARGINE 100 [IU]/ML
5 INJECTION, SOLUTION SUBCUTANEOUS
Status: DISCONTINUED | OUTPATIENT
Start: 2021-01-01 | End: 2021-01-01

## 2021-01-01 RX ORDER — DEXAMETHASONE 1 MG
1 TABLET ORAL 2 TIMES DAILY
Status: DISCONTINUED | OUTPATIENT
Start: 2021-01-01 | End: 2021-01-01

## 2021-01-01 RX ORDER — PHENYLEPHRINE HCL IN 0.9% NACL 0.5 MG/5ML
SYRINGE (ML) INTRAVENOUS PRN
Status: DISCONTINUED | OUTPATIENT
Start: 2021-01-01 | End: 2021-01-01 | Stop reason: SURG

## 2021-01-01 RX ORDER — MORPHINE SULFATE 4 MG/ML
4 INJECTION, SOLUTION INTRAMUSCULAR; INTRAVENOUS
Status: DISCONTINUED | OUTPATIENT
Start: 2021-01-01 | End: 2021-01-01

## 2021-01-01 RX ORDER — LISINOPRIL 2.5 MG/1
2.5 TABLET ORAL
Status: DISCONTINUED | OUTPATIENT
Start: 2021-01-01 | End: 2021-01-01

## 2021-01-01 RX ORDER — COLCHICINE 0.6 MG/1
0.6 TABLET ORAL
Status: DISCONTINUED | OUTPATIENT
Start: 2021-01-01 | End: 2021-01-01 | Stop reason: HOSPADM

## 2021-01-01 RX ORDER — HYDROCORTISONE 10 MG/1
10 TABLET ORAL EVERY EVENING
Qty: 30 TABLET | Refills: 0 | Status: SHIPPED | OUTPATIENT
Start: 2021-01-01 | End: 2021-01-01

## 2021-01-01 RX ORDER — MORPHINE SULFATE 4 MG/ML
4 INJECTION, SOLUTION INTRAMUSCULAR; INTRAVENOUS EVERY 12 HOURS PRN
Status: DISCONTINUED | OUTPATIENT
Start: 2021-01-01 | End: 2021-01-01 | Stop reason: HOSPADM

## 2021-01-01 RX ORDER — CIPROFLOXACIN 500 MG/1
250 TABLET, FILM COATED ORAL EVERY 12 HOURS
Status: DISCONTINUED | OUTPATIENT
Start: 2021-01-01 | End: 2021-01-01 | Stop reason: HOSPADM

## 2021-01-01 RX ORDER — LACOSAMIDE 100 MG/1
1 TABLET, FILM COATED ORAL 2 TIMES DAILY
COMMUNITY
Start: 2021-01-01 | End: 2021-01-01 | Stop reason: SDUPTHER

## 2021-01-01 RX ORDER — DEXAMETHASONE SODIUM PHOSPHATE 4 MG/ML
4 INJECTION, SOLUTION INTRA-ARTICULAR; INTRALESIONAL; INTRAMUSCULAR; INTRAVENOUS; SOFT TISSUE EVERY 6 HOURS
Status: DISCONTINUED | OUTPATIENT
Start: 2021-01-01 | End: 2021-01-01

## 2021-01-01 RX ORDER — FUROSEMIDE 10 MG/ML
40 INJECTION INTRAMUSCULAR; INTRAVENOUS
Status: DISCONTINUED | OUTPATIENT
Start: 2021-01-01 | End: 2021-01-01

## 2021-01-01 RX ORDER — DEXAMETHASONE 4 MG/1
2 TABLET ORAL EVERY 12 HOURS
Status: COMPLETED | OUTPATIENT
Start: 2021-01-01 | End: 2021-01-01

## 2021-01-01 RX ORDER — LEVETIRACETAM 5 MG/ML
500 INJECTION INTRAVASCULAR ONCE
Status: COMPLETED | OUTPATIENT
Start: 2021-01-01 | End: 2021-01-01

## 2021-01-01 RX ORDER — INSULIN GLARGINE 100 [IU]/ML
5 INJECTION, SOLUTION SUBCUTANEOUS ONCE
Status: COMPLETED | OUTPATIENT
Start: 2021-01-01 | End: 2021-01-01

## 2021-01-01 RX ORDER — ADENOSINE 3 MG/ML
INJECTION, SOLUTION INTRAVENOUS
Status: DISPENSED
Start: 2021-01-01 | End: 2021-01-01

## 2021-01-01 RX ORDER — WARFARIN SODIUM 3 MG/1
1.5 TABLET ORAL DAILY
Qty: 15 TABLET | Refills: 0 | Status: ON HOLD | OUTPATIENT
Start: 2021-01-01 | End: 2021-01-01

## 2021-01-01 RX ORDER — ACETAMINOPHEN 325 MG/1
325 TABLET ORAL EVERY 6 HOURS PRN
Status: DISCONTINUED | OUTPATIENT
Start: 2021-01-01 | End: 2021-01-01 | Stop reason: HOSPADM

## 2021-01-01 RX ORDER — PROPOFOL 10 MG/ML
40 INJECTION, EMULSION INTRAVENOUS ONCE
Status: DISCONTINUED | OUTPATIENT
Start: 2021-01-01 | End: 2021-01-01

## 2021-01-01 RX ORDER — NOREPINEPHRINE BITARTRATE 0.03 MG/ML
0-30 INJECTION, SOLUTION INTRAVENOUS CONTINUOUS
Status: DISCONTINUED | OUTPATIENT
Start: 2021-01-01 | End: 2021-01-01

## 2021-01-01 RX ORDER — OXYCODONE HYDROCHLORIDE 5 MG/1
10 TABLET ORAL EVERY 6 HOURS PRN
Qty: 12 TABLET | Refills: 0 | Status: SHIPPED | OUTPATIENT
Start: 2021-01-01 | End: 2021-01-01

## 2021-01-01 RX ORDER — DEXAMETHASONE 4 MG/1
2 TABLET ORAL EVERY 8 HOURS
Status: COMPLETED | OUTPATIENT
Start: 2021-01-01 | End: 2021-01-01

## 2021-01-01 RX ORDER — OXYCODONE HYDROCHLORIDE 5 MG/1
5 TABLET ORAL EVERY 4 HOURS PRN
Status: DISCONTINUED | OUTPATIENT
Start: 2021-01-01 | End: 2021-01-01

## 2021-01-01 RX ORDER — FUROSEMIDE 40 MG/1
40 TABLET ORAL
Status: DISCONTINUED | OUTPATIENT
Start: 2021-01-01 | End: 2021-01-01

## 2021-01-01 RX ORDER — MIDAZOLAM HYDROCHLORIDE 1 MG/ML
.5-2 INJECTION INTRAMUSCULAR; INTRAVENOUS PRN
Status: ACTIVE | OUTPATIENT
Start: 2021-01-01 | End: 2021-01-01

## 2021-01-01 RX ORDER — METOPROLOL SUCCINATE 25 MG/1
25 TABLET, EXTENDED RELEASE ORAL EVERY MORNING
Qty: 30 TABLET | Refills: 0 | Status: SHIPPED | OUTPATIENT
Start: 2021-01-01 | End: 2021-01-01 | Stop reason: SDUPTHER

## 2021-01-01 RX ORDER — ONDANSETRON 4 MG/1
4 TABLET, ORALLY DISINTEGRATING ORAL EVERY 4 HOURS PRN
Status: DISCONTINUED | OUTPATIENT
Start: 2021-01-01 | End: 2021-01-01

## 2021-01-01 RX ORDER — AMIODARONE HYDROCHLORIDE 400 MG/1
400 TABLET ORAL 2 TIMES DAILY
Qty: 60 TABLET | Refills: 0 | Status: CANCELLED | OUTPATIENT
Start: 2021-01-01 | End: 2021-01-01

## 2021-01-01 RX ORDER — OXYCODONE HYDROCHLORIDE 10 MG/1
10 TABLET ORAL EVERY 4 HOURS PRN
Status: DISCONTINUED | OUTPATIENT
Start: 2021-01-01 | End: 2021-01-01

## 2021-01-01 RX ORDER — OXYCODONE HYDROCHLORIDE 5 MG/1
5 TABLET ORAL EVERY 6 HOURS PRN
Qty: 15 TABLET | Refills: 0 | Status: SHIPPED | OUTPATIENT
Start: 2021-01-01 | End: 2021-01-01 | Stop reason: SDUPTHER

## 2021-01-01 RX ORDER — TAMSULOSIN HYDROCHLORIDE 0.4 MG/1
0.4 CAPSULE ORAL DAILY
Status: DISCONTINUED | OUTPATIENT
Start: 2021-01-01 | End: 2021-01-01

## 2021-01-01 RX ORDER — MORPHINE SULFATE 4 MG/ML
2 INJECTION, SOLUTION INTRAMUSCULAR; INTRAVENOUS EVERY 6 HOURS PRN
Status: DISCONTINUED | OUTPATIENT
Start: 2021-01-01 | End: 2021-01-01

## 2021-01-01 RX ORDER — DEXAMETHASONE 4 MG/1
4 TABLET ORAL EVERY 8 HOURS
Status: COMPLETED | OUTPATIENT
Start: 2021-01-01 | End: 2021-01-01

## 2021-01-01 RX ORDER — ONDANSETRON 2 MG/ML
4 INJECTION INTRAMUSCULAR; INTRAVENOUS EVERY 4 HOURS PRN
Status: DISCONTINUED | OUTPATIENT
Start: 2021-01-01 | End: 2021-01-01 | Stop reason: HOSPADM

## 2021-01-01 RX ORDER — ASPIRIN 81 MG/1
81 TABLET, CHEWABLE ORAL DAILY
Qty: 100 TABLET | Status: SHIPPED
Start: 2021-01-01

## 2021-01-01 RX ORDER — ONDANSETRON 4 MG/1
4 TABLET, ORALLY DISINTEGRATING ORAL EVERY 4 HOURS PRN
Qty: 10 TABLET | Refills: 0 | Status: SHIPPED | OUTPATIENT
Start: 2021-01-01 | End: 2021-01-01

## 2021-01-01 RX ORDER — CEFAZOLIN SODIUM 2 G/100ML
2 INJECTION, SOLUTION INTRAVENOUS EVERY 8 HOURS
Status: COMPLETED | OUTPATIENT
Start: 2021-01-01 | End: 2021-01-01

## 2021-01-01 RX ORDER — MIDAZOLAM HYDROCHLORIDE 1 MG/ML
INJECTION INTRAMUSCULAR; INTRAVENOUS
Status: COMPLETED
Start: 2021-01-01 | End: 2021-01-01

## 2021-01-01 RX ORDER — SODIUM CHLORIDE, SODIUM LACTATE, POTASSIUM CHLORIDE, CALCIUM CHLORIDE 600; 310; 30; 20 MG/100ML; MG/100ML; MG/100ML; MG/100ML
INJECTION, SOLUTION INTRAVENOUS CONTINUOUS
Status: ACTIVE | OUTPATIENT
Start: 2021-01-01 | End: 2021-01-01

## 2021-01-01 RX ORDER — INSULIN GLARGINE 100 [IU]/ML
10 INJECTION, SOLUTION SUBCUTANEOUS
Status: DISCONTINUED | OUTPATIENT
Start: 2021-01-01 | End: 2021-01-01

## 2021-01-01 RX ORDER — SODIUM CHLORIDE, SODIUM LACTATE, POTASSIUM CHLORIDE, AND CALCIUM CHLORIDE .6; .31; .03; .02 G/100ML; G/100ML; G/100ML; G/100ML
500 INJECTION, SOLUTION INTRAVENOUS
Status: DISCONTINUED | OUTPATIENT
Start: 2021-01-01 | End: 2021-01-01

## 2021-01-01 RX ORDER — OXYCODONE HYDROCHLORIDE 10 MG/1
10 TABLET ORAL EVERY 4 HOURS PRN
Qty: 18 TABLET | Refills: 0 | Status: ON HOLD | OUTPATIENT
Start: 2021-01-01 | End: 2021-01-01

## 2021-01-01 RX ORDER — OXYCODONE HYDROCHLORIDE 10 MG/1
10 TABLET ORAL EVERY 6 HOURS PRN
Status: DISCONTINUED | OUTPATIENT
Start: 2021-01-01 | End: 2021-01-01

## 2021-01-01 RX ORDER — ENALAPRILAT 1.25 MG/ML
1.25 INJECTION INTRAVENOUS EVERY 6 HOURS PRN
Status: DISCONTINUED | OUTPATIENT
Start: 2021-01-01 | End: 2021-01-01 | Stop reason: HOSPADM

## 2021-01-01 RX ORDER — DEXMEDETOMIDINE HYDROCHLORIDE 4 UG/ML
0-1.5 INJECTION, SOLUTION INTRAVENOUS CONTINUOUS
Status: DISCONTINUED | OUTPATIENT
Start: 2021-01-01 | End: 2021-01-01

## 2021-01-01 RX ORDER — FAMOTIDINE 20 MG/1
20 TABLET, FILM COATED ORAL 2 TIMES DAILY
Status: DISCONTINUED | OUTPATIENT
Start: 2021-01-01 | End: 2021-01-01 | Stop reason: HOSPADM

## 2021-01-01 RX ORDER — BUPIVACAINE HYDROCHLORIDE AND EPINEPHRINE 5; 5 MG/ML; UG/ML
INJECTION, SOLUTION PERINEURAL
Status: DISCONTINUED | OUTPATIENT
Start: 2021-01-01 | End: 2021-01-01 | Stop reason: HOSPADM

## 2021-01-01 RX ORDER — MIDAZOLAM HYDROCHLORIDE 1 MG/ML
1 INJECTION INTRAMUSCULAR; INTRAVENOUS
Status: DISCONTINUED | OUTPATIENT
Start: 2021-01-01 | End: 2021-01-01

## 2021-01-01 RX ORDER — IPRATROPIUM BROMIDE AND ALBUTEROL SULFATE 2.5; .5 MG/3ML; MG/3ML
3 SOLUTION RESPIRATORY (INHALATION)
Status: ACTIVE | OUTPATIENT
Start: 2021-01-01 | End: 2021-01-01

## 2021-01-01 RX ORDER — MORPHINE SULFATE 4 MG/ML
2 INJECTION, SOLUTION INTRAMUSCULAR; INTRAVENOUS
Status: DISCONTINUED | OUTPATIENT
Start: 2021-01-01 | End: 2021-01-01 | Stop reason: HOSPADM

## 2021-01-01 RX ORDER — SODIUM CHLORIDE AND POTASSIUM CHLORIDE 150; 900 MG/100ML; MG/100ML
INJECTION, SOLUTION INTRAVENOUS CONTINUOUS
Status: DISCONTINUED | OUTPATIENT
Start: 2021-01-01 | End: 2021-01-01

## 2021-01-01 RX ORDER — FLUDROCORTISONE ACETATE 0.1 MG/1
0.1 TABLET ORAL EVERY MORNING
Status: DISCONTINUED | OUTPATIENT
Start: 2021-01-01 | End: 2021-01-01 | Stop reason: HOSPADM

## 2021-01-01 RX ORDER — METOPROLOL SUCCINATE 50 MG/1
50 TABLET, EXTENDED RELEASE ORAL DAILY
Qty: 90 TABLET | Refills: 3 | Status: SHIPPED | OUTPATIENT
Start: 2021-01-01

## 2021-01-01 RX ORDER — CETIRIZINE HYDROCHLORIDE 10 MG/1
10 TABLET ORAL DAILY
Status: DISCONTINUED | OUTPATIENT
Start: 2021-01-01 | End: 2021-01-01 | Stop reason: HOSPADM

## 2021-01-01 RX ORDER — LEVETIRACETAM 5 MG/ML
500 INJECTION INTRAVASCULAR EVERY 12 HOURS
Status: DISCONTINUED | OUTPATIENT
Start: 2021-01-01 | End: 2021-01-01

## 2021-01-01 RX ORDER — SODIUM CHLORIDE, SODIUM LACTATE, POTASSIUM CHLORIDE, AND CALCIUM CHLORIDE .6; .31; .03; .02 G/100ML; G/100ML; G/100ML; G/100ML
500 INJECTION, SOLUTION INTRAVENOUS ONCE
Status: COMPLETED | OUTPATIENT
Start: 2021-01-01 | End: 2021-01-01

## 2021-01-01 RX ORDER — DEXMEDETOMIDINE HYDROCHLORIDE 4 UG/ML
.1-1.5 INJECTION, SOLUTION INTRAVENOUS CONTINUOUS
Status: DISCONTINUED | OUTPATIENT
Start: 2021-01-01 | End: 2021-01-01

## 2021-01-01 RX ORDER — ONDANSETRON 2 MG/ML
4 INJECTION INTRAMUSCULAR; INTRAVENOUS
Status: COMPLETED | OUTPATIENT
Start: 2021-01-01 | End: 2021-01-01

## 2021-01-01 RX ORDER — ACETAMINOPHEN 500 MG
1000 TABLET ORAL EVERY 6 HOURS PRN
Status: DISCONTINUED | OUTPATIENT
Start: 2021-01-01 | End: 2021-01-01

## 2021-01-01 RX ORDER — LEVETIRACETAM 500 MG/1
1000 TABLET ORAL 2 TIMES DAILY
Status: DISCONTINUED | OUTPATIENT
Start: 2021-01-01 | End: 2021-01-01

## 2021-01-01 RX ORDER — ONDANSETRON 2 MG/ML
4 INJECTION INTRAMUSCULAR; INTRAVENOUS PRN
Status: ACTIVE | OUTPATIENT
Start: 2021-01-01 | End: 2021-01-01

## 2021-01-01 RX ORDER — ACETAMINOPHEN 325 MG/1
650 TABLET ORAL 3 TIMES DAILY
Status: DISCONTINUED | OUTPATIENT
Start: 2021-01-01 | End: 2021-01-01 | Stop reason: HOSPADM

## 2021-01-01 RX ORDER — CEFAZOLIN SODIUM 1 G/3ML
INJECTION, POWDER, FOR SOLUTION INTRAMUSCULAR; INTRAVENOUS PRN
Status: DISCONTINUED | OUTPATIENT
Start: 2021-01-01 | End: 2021-01-01 | Stop reason: SURG

## 2021-01-01 RX ORDER — ONDANSETRON 2 MG/ML
4 INJECTION INTRAMUSCULAR; INTRAVENOUS ONCE
Status: COMPLETED | OUTPATIENT
Start: 2021-01-01 | End: 2021-01-01

## 2021-01-01 RX ORDER — PRENATAL VIT 91/IRON/FOLIC/DHA 28-975-200
1 COMBINATION PACKAGE (EA) ORAL DAILY
COMMUNITY

## 2021-01-01 RX ORDER — WARFARIN SODIUM 1 MG/1
1 TABLET ORAL DAILY
Status: DISCONTINUED | OUTPATIENT
Start: 2021-01-01 | End: 2021-01-01

## 2021-01-01 RX ORDER — METOPROLOL SUCCINATE 25 MG/1
50 TABLET, EXTENDED RELEASE ORAL DAILY
Status: DISCONTINUED | OUTPATIENT
Start: 2021-01-01 | End: 2021-01-01

## 2021-01-01 RX ORDER — OXYCODONE HYDROCHLORIDE 5 MG/1
10 TABLET ORAL ONCE
Status: COMPLETED | OUTPATIENT
Start: 2021-01-01 | End: 2021-01-01

## 2021-01-01 RX ORDER — DEXAMETHASONE 4 MG/1
2 TABLET ORAL DAILY
Status: COMPLETED | OUTPATIENT
Start: 2021-01-01 | End: 2021-01-01

## 2021-01-01 RX ORDER — METOPROLOL TARTRATE 1 MG/ML
5 INJECTION, SOLUTION INTRAVENOUS
Status: COMPLETED | OUTPATIENT
Start: 2021-01-01 | End: 2021-01-01

## 2021-01-01 RX ORDER — LIDOCAINE HYDROCHLORIDE 40 MG/ML
SOLUTION TOPICAL PRN
Status: DISCONTINUED | OUTPATIENT
Start: 2021-01-01 | End: 2021-01-01 | Stop reason: SURG

## 2021-01-01 RX ORDER — SODIUM CHLORIDE 1 G/1
1 TABLET ORAL 2 TIMES DAILY WITH MEALS
Status: DISCONTINUED | OUTPATIENT
Start: 2021-01-01 | End: 2021-01-01

## 2021-01-01 RX ORDER — MAGNESIUM SULFATE 1 G/100ML
1 INJECTION INTRAVENOUS ONCE
Status: COMPLETED | OUTPATIENT
Start: 2021-01-01 | End: 2021-01-01

## 2021-01-01 RX ORDER — SODIUM CHLORIDE, SODIUM LACTATE, POTASSIUM CHLORIDE, AND CALCIUM CHLORIDE .6; .31; .03; .02 G/100ML; G/100ML; G/100ML; G/100ML
30 INJECTION, SOLUTION INTRAVENOUS
Status: DISCONTINUED | OUTPATIENT
Start: 2021-01-01 | End: 2021-01-01

## 2021-01-01 RX ORDER — FLUDROCORTISONE ACETATE 0.1 MG/1
0.1 TABLET ORAL EVERY MORNING
Qty: 30 TABLET | Refills: 0 | Status: SHIPPED | OUTPATIENT
Start: 2021-01-01 | End: 2021-01-01

## 2021-01-01 RX ORDER — DEXAMETHASONE 1 MG
2 TABLET ORAL 3 TIMES DAILY
Status: DISCONTINUED | OUTPATIENT
Start: 2021-01-01 | End: 2021-01-01

## 2021-01-01 RX ORDER — MIDAZOLAM HYDROCHLORIDE 1 MG/ML
INJECTION INTRAMUSCULAR; INTRAVENOUS
Status: ACTIVE
Start: 2021-01-01 | End: 2021-01-01

## 2021-01-01 RX ORDER — METOPROLOL SUCCINATE 25 MG/1
25 TABLET, EXTENDED RELEASE ORAL EVERY MORNING
Status: DISCONTINUED | OUTPATIENT
Start: 2021-01-01 | End: 2021-01-01 | Stop reason: HOSPADM

## 2021-01-01 RX ORDER — LISINOPRIL 5 MG/1
2.5 TABLET ORAL
Status: DISCONTINUED | OUTPATIENT
Start: 2021-01-01 | End: 2021-01-01 | Stop reason: HOSPADM

## 2021-01-01 RX ORDER — AMIODARONE HYDROCHLORIDE 200 MG/1
200 TABLET ORAL DAILY
Qty: 30 TABLET | Refills: 0 | Status: SHIPPED | OUTPATIENT
Start: 2021-01-01 | End: 2021-01-01

## 2021-01-01 RX ORDER — AMIODARONE HYDROCHLORIDE 200 MG/1
400 TABLET ORAL TWICE DAILY
Status: DISCONTINUED | OUTPATIENT
Start: 2021-01-01 | End: 2021-01-01 | Stop reason: HOSPADM

## 2021-01-01 RX ORDER — FINASTERIDE 5 MG/1
5 TABLET, FILM COATED ORAL DAILY
Status: DISCONTINUED | OUTPATIENT
Start: 2021-01-01 | End: 2021-01-01

## 2021-01-01 RX ORDER — CEFAZOLIN SODIUM 1 G/3ML
INJECTION, POWDER, FOR SOLUTION INTRAMUSCULAR; INTRAVENOUS
Status: DISCONTINUED | OUTPATIENT
Start: 2021-01-01 | End: 2021-01-01 | Stop reason: HOSPADM

## 2021-01-01 RX ADMIN — DOCUSATE SODIUM 50 MG AND SENNOSIDES 8.6 MG 2 TABLET: 8.6; 5 TABLET, FILM COATED ORAL at 06:04

## 2021-01-01 RX ADMIN — CEFEPIME 2 G: 2 INJECTION, POWDER, FOR SOLUTION INTRAVENOUS at 06:05

## 2021-01-01 RX ADMIN — TAMSULOSIN HYDROCHLORIDE 0.4 MG: 0.4 CAPSULE ORAL at 05:07

## 2021-01-01 RX ADMIN — SODIUM CHLORIDE: 9 INJECTION, SOLUTION INTRAVENOUS at 02:51

## 2021-01-01 RX ADMIN — SODIUM CHLORIDE 1000 ML: 9 INJECTION, SOLUTION INTRAVENOUS at 15:23

## 2021-01-01 RX ADMIN — INSULIN HUMAN 2 UNITS: 100 INJECTION, SOLUTION PARENTERAL at 21:14

## 2021-01-01 RX ADMIN — OXYCODONE 10 MG: 5 TABLET ORAL at 20:26

## 2021-01-01 RX ADMIN — HYDROCORTISONE 20 MG: 20 TABLET ORAL at 06:22

## 2021-01-01 RX ADMIN — ACETAMINOPHEN 650 MG: 325 TABLET, FILM COATED ORAL at 12:00

## 2021-01-01 RX ADMIN — METOPROLOL TARTRATE 12.5 MG: 25 TABLET, FILM COATED ORAL at 06:14

## 2021-01-01 RX ADMIN — ONDANSETRON 4 MG: 2 INJECTION INTRAMUSCULAR; INTRAVENOUS at 01:31

## 2021-01-01 RX ADMIN — DEXAMETHASONE 4 MG: 4 TABLET ORAL at 15:19

## 2021-01-01 RX ADMIN — FLUDROCORTISONE ACETATE 0.1 MG: 0.1 TABLET ORAL at 05:26

## 2021-01-01 RX ADMIN — THERA TABS 1 TABLET: TAB at 05:09

## 2021-01-01 RX ADMIN — INSULIN HUMAN 2 UNITS: 100 INJECTION, SOLUTION PARENTERAL at 08:58

## 2021-01-01 RX ADMIN — LACOSAMIDE 100 MG: 100 TABLET, FILM COATED ORAL at 16:27

## 2021-01-01 RX ADMIN — METOPROLOL TARTRATE 25 MG: 25 TABLET, FILM COATED ORAL at 17:29

## 2021-01-01 RX ADMIN — AMIODARONE HYDROCHLORIDE 0.5 MG/MIN: 1.8 INJECTION, SOLUTION INTRAVENOUS at 02:51

## 2021-01-01 RX ADMIN — AMIODARONE HYDROCHLORIDE 400 MG: 200 TABLET ORAL at 17:34

## 2021-01-01 RX ADMIN — FUROSEMIDE 40 MG: 10 INJECTION, SOLUTION INTRAMUSCULAR; INTRAVENOUS at 17:34

## 2021-01-01 RX ADMIN — LACOSAMIDE 100 MG: 100 TABLET, FILM COATED ORAL at 16:31

## 2021-01-01 RX ADMIN — FENTANYL CITRATE 50 MCG: 50 INJECTION, SOLUTION INTRAMUSCULAR; INTRAVENOUS at 16:55

## 2021-01-01 RX ADMIN — METOPROLOL TARTRATE 12.5 MG: 25 TABLET, FILM COATED ORAL at 05:19

## 2021-01-01 RX ADMIN — OXYCODONE HYDROCHLORIDE 10 MG: 10 TABLET ORAL at 08:54

## 2021-01-01 RX ADMIN — OXYCODONE 10 MG: 5 TABLET ORAL at 11:43

## 2021-01-01 RX ADMIN — DEXAMETHASONE SODIUM PHOSPHATE 4 MG: 4 INJECTION, SOLUTION INTRA-ARTICULAR; INTRALESIONAL; INTRAMUSCULAR; INTRAVENOUS; SOFT TISSUE at 05:47

## 2021-01-01 RX ADMIN — INSULIN HUMAN 1 UNITS: 100 INJECTION, SOLUTION PARENTERAL at 20:44

## 2021-01-01 RX ADMIN — MORPHINE SULFATE 2 MG: 4 INJECTION INTRAVENOUS at 12:34

## 2021-01-01 RX ADMIN — DEXAMETHASONE 3 MG: 6 TABLET ORAL at 14:04

## 2021-01-01 RX ADMIN — ALLOPURINOL 100 MG: 100 TABLET ORAL at 05:34

## 2021-01-01 RX ADMIN — PROPOFOL 20 MCG/KG/MIN: 10 INJECTION, EMULSION INTRAVENOUS at 19:30

## 2021-01-01 RX ADMIN — LEVETIRACETAM 500 MG: 500 TABLET ORAL at 17:23

## 2021-01-01 RX ADMIN — AMIODARONE HYDROCHLORIDE 1 MG/MIN: 1.8 INJECTION, SOLUTION INTRAVENOUS at 13:06

## 2021-01-01 RX ADMIN — AMIODARONE HYDROCHLORIDE 400 MG: 200 TABLET ORAL at 05:15

## 2021-01-01 RX ADMIN — POTASSIUM PHOSPHATE, MONOBASIC AND POTASSIUM PHOSPHATE, DIBASIC 15 MMOL: 224; 236 INJECTION, SOLUTION, CONCENTRATE INTRAVENOUS at 08:24

## 2021-01-01 RX ADMIN — DOCUSATE SODIUM 50 MG AND SENNOSIDES 8.6 MG 2 TABLET: 8.6; 5 TABLET, FILM COATED ORAL at 17:22

## 2021-01-01 RX ADMIN — OXYCODONE HYDROCHLORIDE 5 MG: 5 TABLET ORAL at 13:05

## 2021-01-01 RX ADMIN — ENOXAPARIN SODIUM 100 MG: 100 INJECTION SUBCUTANEOUS at 05:15

## 2021-01-01 RX ADMIN — INSULIN HUMAN 2 UNITS: 100 INJECTION, SOLUTION PARENTERAL at 11:41

## 2021-01-01 RX ADMIN — ONDANSETRON 4 MG: 2 INJECTION INTRAMUSCULAR; INTRAVENOUS at 09:14

## 2021-01-01 RX ADMIN — THERA TABS 1 TABLET: TAB at 05:19

## 2021-01-01 RX ADMIN — DOCUSATE SODIUM 50 MG AND SENNOSIDES 8.6 MG 2 TABLET: 8.6; 5 TABLET, FILM COATED ORAL at 04:05

## 2021-01-01 RX ADMIN — ACETAMINOPHEN 650 MG: 325 TABLET, FILM COATED ORAL at 05:45

## 2021-01-01 RX ADMIN — CEFEPIME 2 G: 2 INJECTION, POWDER, FOR SOLUTION INTRAVENOUS at 12:26

## 2021-01-01 RX ADMIN — LACOSAMIDE 100 MG: 100 TABLET, FILM COATED ORAL at 05:39

## 2021-01-01 RX ADMIN — ONDANSETRON 4 MG: 2 INJECTION INTRAMUSCULAR; INTRAVENOUS at 13:13

## 2021-01-01 RX ADMIN — Medication 2 G: at 11:52

## 2021-01-01 RX ADMIN — HEPARIN SODIUM 7100 UNITS: 1000 INJECTION, SOLUTION INTRAVENOUS; SUBCUTANEOUS at 18:24

## 2021-01-01 RX ADMIN — MAGNESIUM HYDROXIDE 30 ML: 400 SUSPENSION ORAL at 05:41

## 2021-01-01 RX ADMIN — OXYCODONE HYDROCHLORIDE 10 MG: 10 TABLET ORAL at 02:15

## 2021-01-01 RX ADMIN — ACETAMINOPHEN 650 MG: 325 TABLET, FILM COATED ORAL at 12:06

## 2021-01-01 RX ADMIN — ACETAMINOPHEN 1000 MG: 500 TABLET, FILM COATED ORAL at 16:55

## 2021-01-01 RX ADMIN — ACETAMINOPHEN 650 MG: 325 TABLET, FILM COATED ORAL at 12:01

## 2021-01-01 RX ADMIN — LEVETIRACETAM 500 MG: 100 SOLUTION ORAL at 17:55

## 2021-01-01 RX ADMIN — DIBASIC SODIUM PHOSPHATE, MONOBASIC POTASSIUM PHOSPHATE AND MONOBASIC SODIUM PHOSPHATE 500 MG: 852; 155; 130 TABLET ORAL at 08:30

## 2021-01-01 RX ADMIN — OXYCODONE HYDROCHLORIDE 10 MG: 10 TABLET ORAL at 16:41

## 2021-01-01 RX ADMIN — ACETAMINOPHEN 650 MG: 325 TABLET, FILM COATED ORAL at 01:17

## 2021-01-01 RX ADMIN — SODIUM CHLORIDE 1000 ML: 9 INJECTION, SOLUTION INTRAVENOUS at 08:49

## 2021-01-01 RX ADMIN — MAGNESIUM SULFATE 2 G: 2 INJECTION INTRAVENOUS at 07:54

## 2021-01-01 RX ADMIN — CIPROFLOXACIN HYDROCHLORIDE 250 MG: 500 TABLET, FILM COATED ORAL at 05:51

## 2021-01-01 RX ADMIN — METOPROLOL TARTRATE 12.5 MG: 25 TABLET, FILM COATED ORAL at 17:49

## 2021-01-01 RX ADMIN — TAMSULOSIN HYDROCHLORIDE 0.4 MG: 0.4 CAPSULE ORAL at 06:07

## 2021-01-01 RX ADMIN — METOPROLOL TARTRATE 25 MG: 25 TABLET, FILM COATED ORAL at 05:27

## 2021-01-01 RX ADMIN — OXYCODONE 5 MG: 5 TABLET ORAL at 03:32

## 2021-01-01 RX ADMIN — LISINOPRIL 2.5 MG: 2.5 TABLET ORAL at 04:58

## 2021-01-01 RX ADMIN — INSULIN HUMAN 3 UNITS: 100 INJECTION, SOLUTION PARENTERAL at 17:56

## 2021-01-01 RX ADMIN — Medication 1 G: at 12:18

## 2021-01-01 RX ADMIN — METOPROLOL TARTRATE 12.5 MG: 25 TABLET, FILM COATED ORAL at 16:55

## 2021-01-01 RX ADMIN — MORPHINE SULFATE 2 MG: 4 INJECTION INTRAVENOUS at 22:11

## 2021-01-01 RX ADMIN — LACOSAMIDE 100 MG: 100 TABLET, FILM COATED ORAL at 17:22

## 2021-01-01 RX ADMIN — DOCUSATE SODIUM 50 MG AND SENNOSIDES 8.6 MG 2 TABLET: 8.6; 5 TABLET, FILM COATED ORAL at 05:21

## 2021-01-01 RX ADMIN — ZINC SULFATE 220 MG (50 MG) CAPSULE 220 MG: CAPSULE at 05:16

## 2021-01-01 RX ADMIN — Medication 1 G: at 17:17

## 2021-01-01 RX ADMIN — METRONIDAZOLE 500 MG: 500 TABLET ORAL at 06:51

## 2021-01-01 RX ADMIN — METOPROLOL TARTRATE 5 MG: 5 INJECTION INTRAVENOUS at 11:34

## 2021-01-01 RX ADMIN — LISINOPRIL 2.5 MG: 2.5 TABLET ORAL at 05:23

## 2021-01-01 RX ADMIN — THERA TABS 1 TABLET: TAB at 05:41

## 2021-01-01 RX ADMIN — LACOSAMIDE 100 MG: 100 TABLET, FILM COATED ORAL at 06:18

## 2021-01-01 RX ADMIN — ONDANSETRON 4 MG: 2 INJECTION INTRAMUSCULAR; INTRAVENOUS at 23:15

## 2021-01-01 RX ADMIN — INSULIN GLARGINE 15 UNITS: 100 INJECTION, SOLUTION SUBCUTANEOUS at 09:25

## 2021-01-01 RX ADMIN — DEXAMETHASONE 2 MG: 4 TABLET ORAL at 06:07

## 2021-01-01 RX ADMIN — ACETAMINOPHEN 650 MG: 325 TABLET, FILM COATED ORAL at 17:04

## 2021-01-01 RX ADMIN — FLUDROCORTISONE ACETATE 0.1 MG: 0.1 TABLET ORAL at 04:55

## 2021-01-01 RX ADMIN — LEVETIRACETAM 500 MG: 500 TABLET ORAL at 17:11

## 2021-01-01 RX ADMIN — INSULIN HUMAN 2 UNITS: 100 INJECTION, SOLUTION PARENTERAL at 11:10

## 2021-01-01 RX ADMIN — LISINOPRIL 2.5 MG: 2.5 TABLET ORAL at 06:09

## 2021-01-01 RX ADMIN — METOPROLOL SUCCINATE 50 MG: 50 TABLET, EXTENDED RELEASE ORAL at 05:42

## 2021-01-01 RX ADMIN — TAMSULOSIN HYDROCHLORIDE 0.4 MG: 0.4 CAPSULE ORAL at 04:57

## 2021-01-01 RX ADMIN — ALLOPURINOL 100 MG: 100 TABLET ORAL at 05:18

## 2021-01-01 RX ADMIN — SODIUM CHLORIDE 4 MILLION UNITS: 9 INJECTION, SOLUTION INTRAVENOUS at 00:12

## 2021-01-01 RX ADMIN — PHYTONADIONE 10 MG: 10 INJECTION, EMULSION INTRAMUSCULAR; INTRAVENOUS; SUBCUTANEOUS at 13:00

## 2021-01-01 RX ADMIN — HEPARIN SODIUM 22 UNITS/KG/HR: 5000 INJECTION, SOLUTION INTRAVENOUS at 15:17

## 2021-01-01 RX ADMIN — MIDAZOLAM HYDROCHLORIDE 1 MG: 1 INJECTION, SOLUTION INTRAMUSCULAR; INTRAVENOUS at 13:52

## 2021-01-01 RX ADMIN — METOPROLOL TARTRATE 25 MG: 25 TABLET, FILM COATED ORAL at 17:21

## 2021-01-01 RX ADMIN — ACETAMINOPHEN 650 MG: 325 TABLET, FILM COATED ORAL at 05:20

## 2021-01-01 RX ADMIN — DEXAMETHASONE 4 MG: 4 TABLET ORAL at 23:33

## 2021-01-01 RX ADMIN — DOCUSATE SODIUM 50 MG AND SENNOSIDES 8.6 MG 2 TABLET: 8.6; 5 TABLET, FILM COATED ORAL at 06:14

## 2021-01-01 RX ADMIN — INSULIN HUMAN 2 UNITS: 100 INJECTION, SOLUTION PARENTERAL at 11:43

## 2021-01-01 RX ADMIN — DEXAMETHASONE SODIUM PHOSPHATE 6 MG: 4 INJECTION, SOLUTION INTRA-ARTICULAR; INTRALESIONAL; INTRAMUSCULAR; INTRAVENOUS; SOFT TISSUE at 06:03

## 2021-01-01 RX ADMIN — OXYCODONE HYDROCHLORIDE 10 MG: 10 TABLET ORAL at 07:48

## 2021-01-01 RX ADMIN — DEXMEDETOMIDINE 0.7 MCG/KG/HR: 200 INJECTION, SOLUTION INTRAVENOUS at 10:51

## 2021-01-01 RX ADMIN — Medication 2 G: at 12:57

## 2021-01-01 RX ADMIN — LIDOCAINE HYDROCHLORIDE 160 MG: 40 SOLUTION TOPICAL at 07:10

## 2021-01-01 RX ADMIN — Medication 2 G: at 16:08

## 2021-01-01 RX ADMIN — METOPROLOL TARTRATE 25 MG: 25 TABLET, FILM COATED ORAL at 10:09

## 2021-01-01 RX ADMIN — Medication 2 G: at 07:48

## 2021-01-01 RX ADMIN — POLYETHYLENE GLYCOL 3350 1 PACKET: 17 POWDER, FOR SOLUTION ORAL at 12:00

## 2021-01-01 RX ADMIN — Medication 2 G: at 17:22

## 2021-01-01 RX ADMIN — METOPROLOL TARTRATE 12.5 MG: 25 TABLET, FILM COATED ORAL at 16:32

## 2021-01-01 RX ADMIN — HEPARIN SODIUM 18 UNITS/KG/HR: 5000 INJECTION, SOLUTION INTRAVENOUS at 18:31

## 2021-01-01 RX ADMIN — OXYCODONE HYDROCHLORIDE 10 MG: 10 TABLET ORAL at 01:15

## 2021-01-01 RX ADMIN — ACETAMINOPHEN 650 MG: 325 TABLET, FILM COATED ORAL at 17:11

## 2021-01-01 RX ADMIN — OXYCODONE 5 MG: 5 TABLET ORAL at 04:22

## 2021-01-01 RX ADMIN — ONDANSETRON 4 MG: 2 INJECTION INTRAMUSCULAR; INTRAVENOUS at 10:32

## 2021-01-01 RX ADMIN — OXYCODONE HYDROCHLORIDE 10 MG: 10 TABLET ORAL at 18:00

## 2021-01-01 RX ADMIN — HYDROCORTISONE 10 MG: 10 TABLET ORAL at 16:51

## 2021-01-01 RX ADMIN — LACOSAMIDE 100 MG: 100 TABLET, FILM COATED ORAL at 04:59

## 2021-01-01 RX ADMIN — SODIUM CHLORIDE 3 G: 900 INJECTION INTRAVENOUS at 16:44

## 2021-01-01 RX ADMIN — DOCUSATE SODIUM 50 MG AND SENNOSIDES 8.6 MG 2 TABLET: 8.6; 5 TABLET, FILM COATED ORAL at 06:00

## 2021-01-01 RX ADMIN — OXYCODONE 5 MG: 5 TABLET ORAL at 05:52

## 2021-01-01 RX ADMIN — Medication 2 G: at 07:59

## 2021-01-01 RX ADMIN — ALLOPURINOL 100 MG: 100 TABLET ORAL at 05:41

## 2021-01-01 RX ADMIN — MORPHINE SULFATE 2 MG: 4 INJECTION INTRAVENOUS at 12:54

## 2021-01-01 RX ADMIN — COSYNTROPIN 1 MCG: 0.25 INJECTION, POWDER, LYOPHILIZED, FOR SOLUTION INTRAVENOUS at 14:30

## 2021-01-01 RX ADMIN — LORAZEPAM 2 MG: 2 INJECTION INTRAMUSCULAR; INTRAVENOUS at 17:54

## 2021-01-01 RX ADMIN — CETIRIZINE HYDROCHLORIDE 10 MG: 10 TABLET, FILM COATED ORAL at 04:42

## 2021-01-01 RX ADMIN — SODIUM CHLORIDE 2.5 MILLION UNITS: 9 INJECTION, SOLUTION INTRAVENOUS at 13:53

## 2021-01-01 RX ADMIN — LISINOPRIL 2.5 MG: 5 TABLET ORAL at 04:08

## 2021-01-01 RX ADMIN — OXYCODONE HYDROCHLORIDE 10 MG: 10 TABLET ORAL at 16:10

## 2021-01-01 RX ADMIN — OXYCODONE HYDROCHLORIDE 10 MG: 10 TABLET ORAL at 16:59

## 2021-01-01 RX ADMIN — DOCUSATE SODIUM 50 MG AND SENNOSIDES 8.6 MG 2 TABLET: 8.6; 5 TABLET, FILM COATED ORAL at 04:58

## 2021-01-01 RX ADMIN — ACETAMINOPHEN 650 MG: 325 TABLET, FILM COATED ORAL at 18:01

## 2021-01-01 RX ADMIN — INSULIN HUMAN 2 UNITS: 100 INJECTION, SOLUTION PARENTERAL at 17:41

## 2021-01-01 RX ADMIN — HYDROCORTISONE 20 MG: 10 TABLET ORAL at 06:05

## 2021-01-01 RX ADMIN — MORPHINE SULFATE 4 MG: 4 INJECTION INTRAVENOUS at 08:33

## 2021-01-01 RX ADMIN — POTASSIUM CHLORIDE 10 MEQ: 1500 TABLET, EXTENDED RELEASE ORAL at 17:41

## 2021-01-01 RX ADMIN — DEXAMETHASONE 3 MG: 6 TABLET ORAL at 13:22

## 2021-01-01 RX ADMIN — CIPROFLOXACIN HYDROCHLORIDE 250 MG: 500 TABLET, FILM COATED ORAL at 17:04

## 2021-01-01 RX ADMIN — INSULIN HUMAN 3 UNITS: 100 INJECTION, SOLUTION PARENTERAL at 17:02

## 2021-01-01 RX ADMIN — POTASSIUM PHOSPHATE, MONOBASIC AND POTASSIUM PHOSPHATE, DIBASIC 15 MMOL: 224; 236 INJECTION, SOLUTION, CONCENTRATE INTRAVENOUS at 07:47

## 2021-01-01 RX ADMIN — ONDANSETRON 4 MG: 4 TABLET, ORALLY DISINTEGRATING ORAL at 09:07

## 2021-01-01 RX ADMIN — ACETAMINOPHEN 650 MG: 325 TABLET, FILM COATED ORAL at 17:20

## 2021-01-01 RX ADMIN — CHOLECALCIFEROL (VITAMIN D3) 10 MCG (400 UNIT) TABLET 800 UNITS: at 05:38

## 2021-01-01 RX ADMIN — THERA TABS 1 TABLET: TAB at 04:42

## 2021-01-01 RX ADMIN — MORPHINE SULFATE 2 MG: 4 INJECTION INTRAVENOUS at 21:11

## 2021-01-01 RX ADMIN — ACETAMINOPHEN 325 MG: 325 TABLET, FILM COATED ORAL at 23:39

## 2021-01-01 RX ADMIN — Medication 1 G: at 09:01

## 2021-01-01 RX ADMIN — DOCUSATE SODIUM 50 MG AND SENNOSIDES 8.6 MG 2 TABLET: 8.6; 5 TABLET, FILM COATED ORAL at 05:46

## 2021-01-01 RX ADMIN — METOPROLOL SUCCINATE 50 MG: 50 TABLET, EXTENDED RELEASE ORAL at 05:03

## 2021-01-01 RX ADMIN — MORPHINE SULFATE 4 MG: 4 INJECTION INTRAVENOUS at 10:05

## 2021-01-01 RX ADMIN — Medication 2 G: at 07:06

## 2021-01-01 RX ADMIN — OXYCODONE 10 MG: 5 TABLET ORAL at 09:48

## 2021-01-01 RX ADMIN — METOPROLOL TARTRATE 12.5 MG: 25 TABLET, FILM COATED ORAL at 06:07

## 2021-01-01 RX ADMIN — MORPHINE SULFATE 2 MG: 4 INJECTION INTRAVENOUS at 23:15

## 2021-01-01 RX ADMIN — ACETAMINOPHEN 650 MG: 325 TABLET, FILM COATED ORAL at 05:44

## 2021-01-01 RX ADMIN — ACETAMINOPHEN 650 MG: 325 TABLET, FILM COATED ORAL at 00:51

## 2021-01-01 RX ADMIN — ENOXAPARIN SODIUM 100 MG: 100 INJECTION SUBCUTANEOUS at 17:38

## 2021-01-01 RX ADMIN — SODIUM CHLORIDE 2.5 MILLION UNITS: 9 INJECTION, SOLUTION INTRAVENOUS at 17:43

## 2021-01-01 RX ADMIN — FAMOTIDINE 20 MG: 20 TABLET ORAL at 06:17

## 2021-01-01 RX ADMIN — TAMSULOSIN HYDROCHLORIDE 0.4 MG: 0.4 CAPSULE ORAL at 05:27

## 2021-01-01 RX ADMIN — FLUDROCORTISONE ACETATE 0.1 MG: 0.1 TABLET ORAL at 15:20

## 2021-01-01 RX ADMIN — INSULIN HUMAN 3 UNITS: 100 INJECTION, SOLUTION PARENTERAL at 16:22

## 2021-01-01 RX ADMIN — INSULIN HUMAN 2 UNITS: 100 INJECTION, SOLUTION PARENTERAL at 11:13

## 2021-01-01 RX ADMIN — LACOSAMIDE 100 MG: 100 TABLET, FILM COATED ORAL at 06:07

## 2021-01-01 RX ADMIN — DEXAMETHASONE 1 MG: 1 TABLET ORAL at 06:27

## 2021-01-01 RX ADMIN — METOPROLOL TARTRATE 12.5 MG: 25 TABLET, FILM COATED ORAL at 05:45

## 2021-01-01 RX ADMIN — OXYCODONE 5 MG: 5 TABLET ORAL at 09:02

## 2021-01-01 RX ADMIN — INSULIN GLARGINE 15 UNITS: 100 INJECTION, SOLUTION SUBCUTANEOUS at 08:57

## 2021-01-01 RX ADMIN — DEXAMETHASONE 2 MG: 4 TABLET ORAL at 12:25

## 2021-01-01 RX ADMIN — ACETAMINOPHEN 650 MG: 325 TABLET, FILM COATED ORAL at 12:50

## 2021-01-01 RX ADMIN — LEVETIRACETAM INJECTION 500 MG: 5 INJECTION INTRAVENOUS at 05:14

## 2021-01-01 RX ADMIN — INSULIN HUMAN 2 UNITS: 100 INJECTION, SOLUTION PARENTERAL at 17:25

## 2021-01-01 RX ADMIN — OXYCODONE HYDROCHLORIDE 10 MG: 10 TABLET ORAL at 06:00

## 2021-01-01 RX ADMIN — SODIUM CHLORIDE 4.5 UNITS/HR: 9 INJECTION, SOLUTION INTRAVENOUS at 07:59

## 2021-01-01 RX ADMIN — THERA TABS 1 TABLET: TAB at 05:39

## 2021-01-01 RX ADMIN — ACETAMINOPHEN 650 MG: 325 TABLET, FILM COATED ORAL at 13:13

## 2021-01-01 RX ADMIN — LACOSAMIDE 100 MG: 100 TABLET, FILM COATED ORAL at 06:09

## 2021-01-01 RX ADMIN — INSULIN HUMAN 2 UNITS: 100 INJECTION, SOLUTION PARENTERAL at 12:19

## 2021-01-01 RX ADMIN — SODIUM CHLORIDE 3 G: 900 INJECTION INTRAVENOUS at 06:20

## 2021-01-01 RX ADMIN — MAGNESIUM SULFATE 1 G: 1 INJECTION INTRAVENOUS at 10:04

## 2021-01-01 RX ADMIN — SODIUM CHLORIDE: 9 INJECTION, SOLUTION INTRAVENOUS at 08:34

## 2021-01-01 RX ADMIN — ENOXAPARIN SODIUM 100 MG: 100 INJECTION SUBCUTANEOUS at 17:20

## 2021-01-01 RX ADMIN — TAMSULOSIN HYDROCHLORIDE 0.4 MG: 0.4 CAPSULE ORAL at 06:22

## 2021-01-01 RX ADMIN — HEPARIN SODIUM 3600 UNITS: 1000 INJECTION, SOLUTION INTRAVENOUS; SUBCUTANEOUS at 04:26

## 2021-01-01 RX ADMIN — ACETAMINOPHEN 650 MG: 325 TABLET, FILM COATED ORAL at 17:16

## 2021-01-01 RX ADMIN — INSULIN HUMAN 2 UNITS: 100 INJECTION, SOLUTION PARENTERAL at 17:57

## 2021-01-01 RX ADMIN — METOPROLOL TARTRATE 12.5 MG: 25 TABLET, FILM COATED ORAL at 06:22

## 2021-01-01 RX ADMIN — INSULIN LISPRO 2 UNITS: 100 INJECTION, SOLUTION INTRAVENOUS; SUBCUTANEOUS at 09:26

## 2021-01-01 RX ADMIN — INSULIN HUMAN 2 UNITS: 100 INJECTION, SOLUTION PARENTERAL at 11:33

## 2021-01-01 RX ADMIN — LEVETIRACETAM 500 MG: 500 TABLET ORAL at 05:27

## 2021-01-01 RX ADMIN — METOPROLOL TARTRATE 25 MG: 25 TABLET, FILM COATED ORAL at 05:07

## 2021-01-01 RX ADMIN — LEVETIRACETAM 500 MG: 100 SOLUTION ORAL at 17:33

## 2021-01-01 RX ADMIN — ACETAMINOPHEN 650 MG: 325 TABLET, FILM COATED ORAL at 23:58

## 2021-01-01 RX ADMIN — DEXAMETHASONE 1 MG: 1 TABLET ORAL at 06:18

## 2021-01-01 RX ADMIN — OXYCODONE 5 MG: 5 TABLET ORAL at 10:40

## 2021-01-01 RX ADMIN — INSULIN HUMAN 3 UNITS: 100 INJECTION, SOLUTION PARENTERAL at 18:26

## 2021-01-01 RX ADMIN — OXYCODONE HYDROCHLORIDE 10 MG: 10 TABLET ORAL at 06:28

## 2021-01-01 RX ADMIN — DEXAMETHASONE 3 MG: 6 TABLET ORAL at 21:24

## 2021-01-01 RX ADMIN — MORPHINE SULFATE 4 MG: 4 INJECTION INTRAVENOUS at 20:30

## 2021-01-01 RX ADMIN — ASPIRIN 81 MG CHEWABLE TABLET 81 MG: 81 TABLET CHEWABLE at 06:00

## 2021-01-01 RX ADMIN — HEPARIN SODIUM 3600 UNITS: 1000 INJECTION, SOLUTION INTRAVENOUS; SUBCUTANEOUS at 04:58

## 2021-01-01 RX ADMIN — LEVETIRACETAM 500 MG: 100 SOLUTION ORAL at 05:46

## 2021-01-01 RX ADMIN — Medication 200 MCG: at 07:17

## 2021-01-01 RX ADMIN — ACETAMINOPHEN 650 MG: 325 TABLET, FILM COATED ORAL at 23:03

## 2021-01-01 RX ADMIN — ACETAMINOPHEN 650 MG: 325 TABLET, FILM COATED ORAL at 17:40

## 2021-01-01 RX ADMIN — DEXAMETHASONE 3 MG: 6 TABLET ORAL at 15:54

## 2021-01-01 RX ADMIN — INSULIN GLARGINE 15 UNITS: 100 INJECTION, SOLUTION SUBCUTANEOUS at 08:34

## 2021-01-01 RX ADMIN — ACETAMINOPHEN 650 MG: 325 TABLET, FILM COATED ORAL at 07:01

## 2021-01-01 RX ADMIN — LISINOPRIL 2.5 MG: 2.5 TABLET ORAL at 05:13

## 2021-01-01 RX ADMIN — LORAZEPAM 6 MG: 2 INJECTION INTRAMUSCULAR; INTRAVENOUS at 16:06

## 2021-01-01 RX ADMIN — INSULIN HUMAN 3 UNITS: 100 INJECTION, SOLUTION PARENTERAL at 21:21

## 2021-01-01 RX ADMIN — METOPROLOL TARTRATE 25 MG: 25 TABLET, FILM COATED ORAL at 05:28

## 2021-01-01 RX ADMIN — METRONIDAZOLE 500 MG: 500 TABLET ORAL at 21:58

## 2021-01-01 RX ADMIN — HYDRALAZINE HYDROCHLORIDE 10 MG: 20 INJECTION INTRAMUSCULAR; INTRAVENOUS at 16:47

## 2021-01-01 RX ADMIN — OXYCODONE HYDROCHLORIDE 10 MG: 10 TABLET ORAL at 23:21

## 2021-01-01 RX ADMIN — LISINOPRIL 2.5 MG: 2.5 TABLET ORAL at 06:22

## 2021-01-01 RX ADMIN — MORPHINE SULFATE 2 MG: 4 INJECTION INTRAVENOUS at 16:13

## 2021-01-01 RX ADMIN — DEXAMETHASONE 3 MG: 6 TABLET ORAL at 05:45

## 2021-01-01 RX ADMIN — SODIUM CHLORIDE 2.5 MILLION UNITS: 9 INJECTION, SOLUTION INTRAVENOUS at 01:58

## 2021-01-01 RX ADMIN — SODIUM CHLORIDE, POTASSIUM CHLORIDE, SODIUM LACTATE AND CALCIUM CHLORIDE: 600; 310; 30; 20 INJECTION, SOLUTION INTRAVENOUS at 16:44

## 2021-01-01 RX ADMIN — THERA TABS 1 TABLET: TAB at 12:18

## 2021-01-01 RX ADMIN — LACOSAMIDE 100 MG: 100 TABLET, FILM COATED ORAL at 17:49

## 2021-01-01 RX ADMIN — LORAZEPAM 4 MG: 2 INJECTION INTRAMUSCULAR; INTRAVENOUS at 16:19

## 2021-01-01 RX ADMIN — INSULIN HUMAN 3 UNITS: 100 INJECTION, SOLUTION PARENTERAL at 16:04

## 2021-01-01 RX ADMIN — METOPROLOL TARTRATE 12.5 MG: 25 TABLET, FILM COATED ORAL at 18:01

## 2021-01-01 RX ADMIN — INSULIN HUMAN 1 UNITS: 100 INJECTION, SOLUTION PARENTERAL at 16:56

## 2021-01-01 RX ADMIN — OXYCODONE 10 MG: 5 TABLET ORAL at 13:18

## 2021-01-01 RX ADMIN — METOPROLOL TARTRATE 12.5 MG: 25 TABLET, FILM COATED ORAL at 06:18

## 2021-01-01 RX ADMIN — INSULIN HUMAN 1 UNITS: 100 INJECTION, SOLUTION PARENTERAL at 00:22

## 2021-01-01 RX ADMIN — INSULIN HUMAN 3 UNITS: 100 INJECTION, SOLUTION PARENTERAL at 17:42

## 2021-01-01 RX ADMIN — DOCUSATE SODIUM 50 MG AND SENNOSIDES 8.6 MG 2 TABLET: 8.6; 5 TABLET, FILM COATED ORAL at 05:40

## 2021-01-01 RX ADMIN — LACOSAMIDE 100 MG: 100 TABLET, FILM COATED ORAL at 17:06

## 2021-01-01 RX ADMIN — DEXMEDETOMIDINE 0.8 MCG/KG/HR: 200 INJECTION, SOLUTION INTRAVENOUS at 19:30

## 2021-01-01 RX ADMIN — FUROSEMIDE 40 MG: 40 TABLET ORAL at 06:51

## 2021-01-01 RX ADMIN — ACETAMINOPHEN 650 MG: 325 TABLET, FILM COATED ORAL at 05:07

## 2021-01-01 RX ADMIN — DEXAMETHASONE 2 MG: 4 TABLET ORAL at 12:18

## 2021-01-01 RX ADMIN — DOCUSATE SODIUM 50 MG AND SENNOSIDES 8.6 MG 2 TABLET: 8.6; 5 TABLET, FILM COATED ORAL at 04:46

## 2021-01-01 RX ADMIN — SODIUM CHLORIDE 4 MILLION UNITS: 9 INJECTION, SOLUTION INTRAVENOUS at 21:35

## 2021-01-01 RX ADMIN — ROCURONIUM BROMIDE 30 MG: 10 INJECTION, SOLUTION INTRAVENOUS at 07:08

## 2021-01-01 RX ADMIN — ACETAMINOPHEN 650 MG: 325 TABLET, FILM COATED ORAL at 17:30

## 2021-01-01 RX ADMIN — ALLOPURINOL 100 MG: 100 TABLET ORAL at 05:43

## 2021-01-01 RX ADMIN — OXYCODONE 5 MG: 5 TABLET ORAL at 09:09

## 2021-01-01 RX ADMIN — ACETAMINOPHEN 1000 MG: 500 TABLET, FILM COATED ORAL at 22:34

## 2021-01-01 RX ADMIN — DEXAMETHASONE 2 MG: 4 TABLET ORAL at 16:58

## 2021-01-01 RX ADMIN — DEXMEDETOMIDINE HYDROCHLORIDE 0.5 MCG/KG/HR: 100 INJECTION, SOLUTION INTRAVENOUS at 15:00

## 2021-01-01 RX ADMIN — Medication 2 G: at 11:17

## 2021-01-01 RX ADMIN — OXYCODONE 5 MG: 5 TABLET ORAL at 12:01

## 2021-01-01 RX ADMIN — ACETAMINOPHEN 650 MG: 325 TABLET, FILM COATED ORAL at 17:05

## 2021-01-01 RX ADMIN — DOCUSATE SODIUM 50 MG AND SENNOSIDES 8.6 MG 2 TABLET: 8.6; 5 TABLET, FILM COATED ORAL at 17:21

## 2021-01-01 RX ADMIN — Medication 2 G: at 08:19

## 2021-01-01 RX ADMIN — VANCOMYCIN HYDROCHLORIDE 2500 MG: 500 INJECTION, POWDER, LYOPHILIZED, FOR SOLUTION INTRAVENOUS at 21:19

## 2021-01-01 RX ADMIN — ACETAMINOPHEN 650 MG: 325 TABLET, FILM COATED ORAL at 17:37

## 2021-01-01 RX ADMIN — Medication 2 G: at 11:05

## 2021-01-01 RX ADMIN — OXYCODONE HYDROCHLORIDE 10 MG: 10 TABLET ORAL at 05:09

## 2021-01-01 RX ADMIN — SODIUM CHLORIDE, POTASSIUM CHLORIDE, SODIUM LACTATE AND CALCIUM CHLORIDE 500 ML: 600; 310; 30; 20 INJECTION, SOLUTION INTRAVENOUS at 22:00

## 2021-01-01 RX ADMIN — ACETAMINOPHEN 650 MG: 325 TABLET, FILM COATED ORAL at 17:38

## 2021-01-01 RX ADMIN — INSULIN GLARGINE 15 UNITS: 100 INJECTION, SOLUTION SUBCUTANEOUS at 08:18

## 2021-01-01 RX ADMIN — FLUDROCORTISONE ACETATE 0.1 MG: 0.1 TABLET ORAL at 06:23

## 2021-01-01 RX ADMIN — OXYCODONE 5 MG: 5 TABLET ORAL at 14:08

## 2021-01-01 RX ADMIN — ZINC SULFATE 220 MG (50 MG) CAPSULE 220 MG: CAPSULE at 05:40

## 2021-01-01 RX ADMIN — INSULIN HUMAN 2 UNITS: 100 INJECTION, SOLUTION PARENTERAL at 08:34

## 2021-01-01 RX ADMIN — THERA TABS 1 TABLET: TAB at 06:52

## 2021-01-01 RX ADMIN — OXYCODONE 5 MG: 5 TABLET ORAL at 21:09

## 2021-01-01 RX ADMIN — LABETALOL HYDROCHLORIDE 10 MG: 5 INJECTION, SOLUTION INTRAVENOUS at 15:42

## 2021-01-01 RX ADMIN — METOPROLOL TARTRATE 12.5 MG: 25 TABLET, FILM COATED ORAL at 05:53

## 2021-01-01 RX ADMIN — CEFTRIAXONE SODIUM 2 G: 2 INJECTION, POWDER, FOR SOLUTION INTRAMUSCULAR; INTRAVENOUS at 05:43

## 2021-01-01 RX ADMIN — INSULIN HUMAN 2 UNITS: 100 INJECTION, SOLUTION PARENTERAL at 20:47

## 2021-01-01 RX ADMIN — ZINC SULFATE 220 MG (50 MG) CAPSULE 220 MG: CAPSULE at 04:42

## 2021-01-01 RX ADMIN — MIDAZOLAM HYDROCHLORIDE 2 MG: 1 INJECTION INTRAMUSCULAR; INTRAVENOUS at 11:05

## 2021-01-01 RX ADMIN — INSULIN GLARGINE 15 UNITS: 100 INJECTION, SOLUTION SUBCUTANEOUS at 07:59

## 2021-01-01 RX ADMIN — ACETAMINOPHEN 650 MG: 325 TABLET, FILM COATED ORAL at 11:45

## 2021-01-01 RX ADMIN — VANCOMYCIN HYDROCHLORIDE 1500 MG: 500 INJECTION, POWDER, LYOPHILIZED, FOR SOLUTION INTRAVENOUS at 20:59

## 2021-01-01 RX ADMIN — ACETAMINOPHEN 650 MG: 325 TABLET, FILM COATED ORAL at 11:11

## 2021-01-01 RX ADMIN — DILTIAZEM HYDROCHLORIDE 15 MG/HR: 5 INJECTION INTRAVENOUS at 20:34

## 2021-01-01 RX ADMIN — ENOXAPARIN SODIUM 120 MG: 120 INJECTION SUBCUTANEOUS at 15:36

## 2021-01-01 RX ADMIN — FUROSEMIDE 40 MG: 40 TABLET ORAL at 14:57

## 2021-01-01 RX ADMIN — OXYCODONE 5 MG: 5 TABLET ORAL at 17:11

## 2021-01-01 RX ADMIN — CHOLECALCIFEROL (VITAMIN D3) 10 MCG (400 UNIT) TABLET 800 UNITS: at 05:07

## 2021-01-01 RX ADMIN — ALLOPURINOL 100 MG: 100 TABLET ORAL at 05:13

## 2021-01-01 RX ADMIN — DOCUSATE SODIUM 50 MG AND SENNOSIDES 8.6 MG 2 TABLET: 8.6; 5 TABLET, FILM COATED ORAL at 05:45

## 2021-01-01 RX ADMIN — OXYCODONE HYDROCHLORIDE 10 MG: 10 TABLET ORAL at 23:31

## 2021-01-01 RX ADMIN — LEVETIRACETAM 1000 MG: 500 TABLET ORAL at 16:56

## 2021-01-01 RX ADMIN — LISINOPRIL 2.5 MG: 5 TABLET ORAL at 05:03

## 2021-01-01 RX ADMIN — IPRATROPIUM BROMIDE 0.5 MG: 0.5 SOLUTION RESPIRATORY (INHALATION) at 08:35

## 2021-01-01 RX ADMIN — OXYCODONE HYDROCHLORIDE 10 MG: 10 TABLET ORAL at 16:27

## 2021-01-01 RX ADMIN — ALLOPURINOL 100 MG: 100 TABLET ORAL at 17:37

## 2021-01-01 RX ADMIN — MORPHINE SULFATE 4 MG: 4 INJECTION INTRAVENOUS at 05:31

## 2021-01-01 RX ADMIN — IPRATROPIUM BROMIDE AND ALBUTEROL SULFATE 3 ML: .5; 3 SOLUTION RESPIRATORY (INHALATION) at 05:27

## 2021-01-01 RX ADMIN — INSULIN HUMAN 2 UNITS: 100 INJECTION, SOLUTION PARENTERAL at 21:05

## 2021-01-01 RX ADMIN — FLUDROCORTISONE ACETATE 0.1 MG: 0.1 TABLET ORAL at 06:28

## 2021-01-01 RX ADMIN — LACOSAMIDE 100 MG: 100 TABLET, FILM COATED ORAL at 16:41

## 2021-01-01 RX ADMIN — FLUDROCORTISONE ACETATE 0.1 MG: 0.1 TABLET ORAL at 04:57

## 2021-01-01 RX ADMIN — ALLOPURINOL 100 MG: 100 TABLET ORAL at 04:44

## 2021-01-01 RX ADMIN — ACETAMINOPHEN 650 MG: 325 TABLET, FILM COATED ORAL at 17:35

## 2021-01-01 RX ADMIN — ACETAMINOPHEN 650 MG: 325 TABLET, FILM COATED ORAL at 14:01

## 2021-01-01 RX ADMIN — POTASSIUM CHLORIDE AND SODIUM CHLORIDE: 900; 150 INJECTION, SOLUTION INTRAVENOUS at 01:49

## 2021-01-01 RX ADMIN — LACOSAMIDE 100 MG: 100 TABLET, FILM COATED ORAL at 16:32

## 2021-01-01 RX ADMIN — DEXAMETHASONE 3 MG: 6 TABLET ORAL at 05:13

## 2021-01-01 RX ADMIN — OXYCODONE HYDROCHLORIDE 10 MG: 10 TABLET ORAL at 06:20

## 2021-01-01 RX ADMIN — CALCIUM CHLORIDE 1000 MG: 100 INJECTION, SOLUTION INTRAVENOUS at 08:33

## 2021-01-01 RX ADMIN — DEXMEDETOMIDINE HYDROCHLORIDE 0.5 MCG/KG/HR: 100 INJECTION, SOLUTION INTRAVENOUS at 14:45

## 2021-01-01 RX ADMIN — TAMSULOSIN HYDROCHLORIDE 0.4 MG: 0.4 CAPSULE ORAL at 16:51

## 2021-01-01 RX ADMIN — LEVETIRACETAM 1000 MG: 500 TABLET ORAL at 05:41

## 2021-01-01 RX ADMIN — Medication 1 G: at 08:31

## 2021-01-01 RX ADMIN — OXYCODONE HYDROCHLORIDE 10 MG: 10 TABLET ORAL at 06:04

## 2021-01-01 RX ADMIN — LACOSAMIDE 100 MG: 100 TABLET, FILM COATED ORAL at 05:19

## 2021-01-01 RX ADMIN — DEXMEDETOMIDINE 0.2 MCG/KG/HR: 200 INJECTION, SOLUTION INTRAVENOUS at 19:30

## 2021-01-01 RX ADMIN — ACETAMINOPHEN 650 MG: 325 TABLET, FILM COATED ORAL at 05:13

## 2021-01-01 RX ADMIN — DOCUSATE SODIUM 50 MG AND SENNOSIDES 8.6 MG 2 TABLET: 8.6; 5 TABLET, FILM COATED ORAL at 05:28

## 2021-01-01 RX ADMIN — CETIRIZINE HYDROCHLORIDE 10 MG: 10 TABLET, FILM COATED ORAL at 16:15

## 2021-01-01 RX ADMIN — MORPHINE SULFATE 2 MG: 4 INJECTION INTRAVENOUS at 03:55

## 2021-01-01 RX ADMIN — LISINOPRIL 2.5 MG: 5 TABLET ORAL at 05:43

## 2021-01-01 RX ADMIN — DOCUSATE SODIUM 50 MG AND SENNOSIDES 8.6 MG 2 TABLET: 8.6; 5 TABLET, FILM COATED ORAL at 17:11

## 2021-01-01 RX ADMIN — MORPHINE SULFATE 2 MG: 4 INJECTION INTRAVENOUS at 07:47

## 2021-01-01 RX ADMIN — ENOXAPARIN SODIUM 100 MG: 100 INJECTION SUBCUTANEOUS at 17:32

## 2021-01-01 RX ADMIN — DOCUSATE SODIUM 50 MG AND SENNOSIDES 8.6 MG 2 TABLET: 8.6; 5 TABLET, FILM COATED ORAL at 05:41

## 2021-01-01 RX ADMIN — ONDANSETRON 4 MG: 2 INJECTION INTRAMUSCULAR; INTRAVENOUS at 04:52

## 2021-01-01 RX ADMIN — ONDANSETRON 4 MG: 4 TABLET, ORALLY DISINTEGRATING ORAL at 22:22

## 2021-01-01 RX ADMIN — DEXAMETHASONE 2 MG: 4 TABLET ORAL at 17:47

## 2021-01-01 RX ADMIN — DEXAMETHASONE 2 MG: 4 TABLET ORAL at 06:15

## 2021-01-01 RX ADMIN — INSULIN HUMAN 2 UNITS: 100 INJECTION, SOLUTION PARENTERAL at 17:03

## 2021-01-01 RX ADMIN — MIDAZOLAM HYDROCHLORIDE 1 MG: 1 INJECTION, SOLUTION INTRAMUSCULAR; INTRAVENOUS at 16:45

## 2021-01-01 RX ADMIN — FAMOTIDINE 20 MG: 20 TABLET ORAL at 18:09

## 2021-01-01 RX ADMIN — LISINOPRIL 2.5 MG: 2.5 TABLET ORAL at 06:28

## 2021-01-01 RX ADMIN — LEVETIRACETAM 500 MG: 500 TABLET ORAL at 17:56

## 2021-01-01 RX ADMIN — ACETAMINOPHEN 325 MG: 325 TABLET, FILM COATED ORAL at 10:40

## 2021-01-01 RX ADMIN — ACETAMINOPHEN 650 MG: 325 TABLET, FILM COATED ORAL at 23:33

## 2021-01-01 RX ADMIN — OXYCODONE HYDROCHLORIDE 10 MG: 10 TABLET ORAL at 09:46

## 2021-01-01 RX ADMIN — LEVETIRACETAM 500 MG: 500 TABLET ORAL at 05:13

## 2021-01-01 RX ADMIN — DEXAMETHASONE SODIUM PHOSPHATE 4 MG: 4 INJECTION, SOLUTION INTRA-ARTICULAR; INTRALESIONAL; INTRAMUSCULAR; INTRAVENOUS; SOFT TISSUE at 17:46

## 2021-01-01 RX ADMIN — DEXMEDETOMIDINE 1 MCG/KG/HR: 200 INJECTION, SOLUTION INTRAVENOUS at 02:23

## 2021-01-01 RX ADMIN — SODIUM CHLORIDE 4 MILLION UNITS: 9 INJECTION, SOLUTION INTRAVENOUS at 13:30

## 2021-01-01 RX ADMIN — FAMOTIDINE 20 MG: 20 TABLET ORAL at 16:15

## 2021-01-01 RX ADMIN — METOPROLOL TARTRATE 25 MG: 25 TABLET, FILM COATED ORAL at 12:18

## 2021-01-01 RX ADMIN — TAMSULOSIN HYDROCHLORIDE 0.4 MG: 0.4 CAPSULE ORAL at 06:09

## 2021-01-01 RX ADMIN — HYDROCORTISONE 10 MG: 10 TABLET ORAL at 17:02

## 2021-01-01 RX ADMIN — POLYETHYLENE GLYCOL 3350 1 PACKET: 17 POWDER, FOR SOLUTION ORAL at 05:42

## 2021-01-01 RX ADMIN — DEXMEDETOMIDINE HYDROCHLORIDE 0.5 MCG/KG/HR: 100 INJECTION, SOLUTION INTRAVENOUS at 09:00

## 2021-01-01 RX ADMIN — DEXAMETHASONE SODIUM PHOSPHATE 6 MG: 4 INJECTION, SOLUTION INTRA-ARTICULAR; INTRALESIONAL; INTRAMUSCULAR; INTRAVENOUS; SOFT TISSUE at 16:44

## 2021-01-01 RX ADMIN — DEXAMETHASONE 2 MG: 4 TABLET ORAL at 17:21

## 2021-01-01 RX ADMIN — ACETAMINOPHEN 650 MG: 325 TABLET, FILM COATED ORAL at 17:15

## 2021-01-01 RX ADMIN — METOPROLOL SUCCINATE 50 MG: 50 TABLET, EXTENDED RELEASE ORAL at 04:43

## 2021-01-01 RX ADMIN — DEXAMETHASONE SODIUM PHOSPHATE 4 MG: 4 INJECTION, SOLUTION INTRA-ARTICULAR; INTRALESIONAL; INTRAMUSCULAR; INTRAVENOUS; SOFT TISSUE at 00:20

## 2021-01-01 RX ADMIN — MAGNESIUM GLUCONATE 500 MG ORAL TABLET 400 MG: 500 TABLET ORAL at 05:38

## 2021-01-01 RX ADMIN — DOCUSATE SODIUM 50 MG AND SENNOSIDES 8.6 MG 2 TABLET: 8.6; 5 TABLET, FILM COATED ORAL at 05:47

## 2021-01-01 RX ADMIN — DEXAMETHASONE 4 MG: 4 TABLET ORAL at 12:42

## 2021-01-01 RX ADMIN — ACETAMINOPHEN 650 MG: 325 TABLET, FILM COATED ORAL at 23:22

## 2021-01-01 RX ADMIN — ACETAMINOPHEN 650 MG: 325 TABLET, FILM COATED ORAL at 16:31

## 2021-01-01 RX ADMIN — INSULIN LISPRO 2 UNITS: 100 INJECTION, SOLUTION INTRAVENOUS; SUBCUTANEOUS at 11:00

## 2021-01-01 RX ADMIN — SODIUM CHLORIDE: 9 INJECTION, SOLUTION INTRAVENOUS at 08:49

## 2021-01-01 RX ADMIN — MORPHINE SULFATE 4 MG: 4 INJECTION INTRAVENOUS at 14:09

## 2021-01-01 RX ADMIN — LACOSAMIDE 100 MG: 100 TABLET, FILM COATED ORAL at 05:47

## 2021-01-01 RX ADMIN — LISINOPRIL 2.5 MG: 2.5 TABLET ORAL at 05:28

## 2021-01-01 RX ADMIN — AZITHROMYCIN MONOHYDRATE 500 MG: 250 TABLET ORAL at 11:34

## 2021-01-01 RX ADMIN — THERA TABS 1 TABLET: TAB at 04:10

## 2021-01-01 RX ADMIN — COLCHICINE 0.6 MG: 0.6 TABLET, FILM COATED ORAL at 06:06

## 2021-01-01 RX ADMIN — LISINOPRIL 2.5 MG: 2.5 TABLET ORAL at 05:08

## 2021-01-01 RX ADMIN — OXYCODONE 10 MG: 5 TABLET ORAL at 00:03

## 2021-01-01 RX ADMIN — SODIUM CHLORIDE 4 MILLION UNITS: 9 INJECTION, SOLUTION INTRAVENOUS at 20:06

## 2021-01-01 RX ADMIN — METOPROLOL TARTRATE 12.5 MG: 25 TABLET, FILM COATED ORAL at 04:56

## 2021-01-01 RX ADMIN — PIPERACILLIN AND TAZOBACTAM 3.38 G: 3; .375 INJECTION, POWDER, LYOPHILIZED, FOR SOLUTION INTRAVENOUS; PARENTERAL at 04:57

## 2021-01-01 RX ADMIN — OXYCODONE HYDROCHLORIDE 10 MG: 10 TABLET ORAL at 21:44

## 2021-01-01 RX ADMIN — SODIUM CHLORIDE 500 ML: 9 INJECTION, SOLUTION INTRAVENOUS at 20:59

## 2021-01-01 RX ADMIN — AMIODARONE HYDROCHLORIDE 400 MG: 200 TABLET ORAL at 04:04

## 2021-01-01 RX ADMIN — TAMSULOSIN HYDROCHLORIDE 0.4 MG: 0.4 CAPSULE ORAL at 05:41

## 2021-01-01 RX ADMIN — METRONIDAZOLE 500 MG: 500 TABLET ORAL at 21:18

## 2021-01-01 RX ADMIN — CHOLECALCIFEROL (VITAMIN D3) 10 MCG (400 UNIT) TABLET 800 UNITS: at 05:47

## 2021-01-01 RX ADMIN — METOPROLOL SUCCINATE 25 MG: 25 TABLET, EXTENDED RELEASE ORAL at 05:52

## 2021-01-01 RX ADMIN — MAGNESIUM SULFATE IN WATER 2 G: 40 INJECTION, SOLUTION INTRAVENOUS at 08:00

## 2021-01-01 RX ADMIN — ONDANSETRON 4 MG: 4 TABLET, ORALLY DISINTEGRATING ORAL at 11:11

## 2021-01-01 RX ADMIN — OXYCODONE 5 MG: 5 TABLET ORAL at 19:32

## 2021-01-01 RX ADMIN — ACETAMINOPHEN 650 MG: 325 TABLET, FILM COATED ORAL at 22:56

## 2021-01-01 RX ADMIN — AMIODARONE HYDROCHLORIDE 400 MG: 200 TABLET ORAL at 17:11

## 2021-01-01 RX ADMIN — MORPHINE SULFATE 2 MG: 4 INJECTION INTRAVENOUS at 04:09

## 2021-01-01 RX ADMIN — OXYCODONE HYDROCHLORIDE 10 MG: 10 TABLET ORAL at 15:44

## 2021-01-01 RX ADMIN — ACETAMINOPHEN 650 MG: 325 TABLET, FILM COATED ORAL at 05:16

## 2021-01-01 RX ADMIN — METOPROLOL TARTRATE 5 MG: 1 INJECTION, SOLUTION INTRAVENOUS at 19:39

## 2021-01-01 RX ADMIN — INSULIN HUMAN 2 UNITS: 100 INJECTION, SOLUTION PARENTERAL at 16:58

## 2021-01-01 RX ADMIN — DEXAMETHASONE 4 MG: 4 TABLET ORAL at 01:38

## 2021-01-01 RX ADMIN — FENTANYL CITRATE 50 MCG: 50 INJECTION, SOLUTION INTRAMUSCULAR; INTRAVENOUS at 09:02

## 2021-01-01 RX ADMIN — INSULIN HUMAN 1 UNITS: 100 INJECTION, SOLUTION PARENTERAL at 17:16

## 2021-01-01 RX ADMIN — ACETAMINOPHEN 650 MG: 325 TABLET, FILM COATED ORAL at 11:37

## 2021-01-01 RX ADMIN — FENTANYL CITRATE 25 MCG: 50 INJECTION, SOLUTION INTRAMUSCULAR; INTRAVENOUS at 13:41

## 2021-01-01 RX ADMIN — CEFEPIME 2 G: 2 INJECTION, POWDER, FOR SOLUTION INTRAVENOUS at 19:39

## 2021-01-01 RX ADMIN — VANCOMYCIN HYDROCHLORIDE 1500 MG: 500 INJECTION, POWDER, LYOPHILIZED, FOR SOLUTION INTRAVENOUS at 09:47

## 2021-01-01 RX ADMIN — TAMSULOSIN HYDROCHLORIDE 0.4 MG: 0.4 CAPSULE ORAL at 06:28

## 2021-01-01 RX ADMIN — FUROSEMIDE 40 MG: 40 TABLET ORAL at 05:42

## 2021-01-01 RX ADMIN — LISINOPRIL 2.5 MG: 2.5 TABLET ORAL at 05:33

## 2021-01-01 RX ADMIN — METOPROLOL TARTRATE 12.5 MG: 25 TABLET, FILM COATED ORAL at 17:04

## 2021-01-01 RX ADMIN — MORPHINE SULFATE 2 MG: 4 INJECTION INTRAVENOUS at 01:58

## 2021-01-01 RX ADMIN — OXYCODONE HYDROCHLORIDE 10 MG: 10 TABLET ORAL at 18:06

## 2021-01-01 RX ADMIN — ALLOPURINOL 100 MG: 100 TABLET ORAL at 05:04

## 2021-01-01 RX ADMIN — DOCUSATE SODIUM 50 MG AND SENNOSIDES 8.6 MG 2 TABLET: 8.6; 5 TABLET, FILM COATED ORAL at 05:07

## 2021-01-01 RX ADMIN — INSULIN HUMAN 3 UNITS: 100 INJECTION, SOLUTION PARENTERAL at 11:47

## 2021-01-01 RX ADMIN — LABETALOL HYDROCHLORIDE 10 MG: 5 INJECTION, SOLUTION INTRAVENOUS at 07:19

## 2021-01-01 RX ADMIN — LACOSAMIDE 100 MG: 100 TABLET, FILM COATED ORAL at 16:36

## 2021-01-01 RX ADMIN — HYDROCORTISONE 10 MG: 10 TABLET ORAL at 17:11

## 2021-01-01 RX ADMIN — METRONIDAZOLE 500 MG: 500 TABLET ORAL at 15:22

## 2021-01-01 RX ADMIN — DEXAMETHASONE 3 MG: 6 TABLET ORAL at 22:56

## 2021-01-01 RX ADMIN — INSULIN HUMAN 5 UNITS: 100 INJECTION, SOLUTION PARENTERAL at 20:23

## 2021-01-01 RX ADMIN — FLUDROCORTISONE ACETATE 0.1 MG: 0.1 TABLET ORAL at 05:24

## 2021-01-01 RX ADMIN — Medication 2 G: at 12:05

## 2021-01-01 RX ADMIN — OXYCODONE 5 MG: 5 TABLET ORAL at 20:06

## 2021-01-01 RX ADMIN — ACETAMINOPHEN 650 MG: 325 TABLET, FILM COATED ORAL at 05:40

## 2021-01-01 RX ADMIN — LACOSAMIDE 100 MG: 100 TABLET, FILM COATED ORAL at 06:15

## 2021-01-01 RX ADMIN — OXYCODONE HYDROCHLORIDE 10 MG: 10 TABLET ORAL at 00:54

## 2021-01-01 RX ADMIN — AMIODARONE HYDROCHLORIDE 400 MG: 200 TABLET ORAL at 04:10

## 2021-01-01 RX ADMIN — METRONIDAZOLE 500 MG: 500 TABLET ORAL at 20:51

## 2021-01-01 RX ADMIN — Medication 2 G: at 16:36

## 2021-01-01 RX ADMIN — Medication 1 G: at 17:40

## 2021-01-01 RX ADMIN — ACETAMINOPHEN 650 MG: 325 TABLET, FILM COATED ORAL at 04:42

## 2021-01-01 RX ADMIN — MORPHINE SULFATE 4 MG: 4 INJECTION INTRAVENOUS at 09:42

## 2021-01-01 RX ADMIN — INSULIN HUMAN 3 UNITS: 100 INJECTION, SOLUTION PARENTERAL at 20:20

## 2021-01-01 RX ADMIN — LACOSAMIDE 100 MG: 100 TABLET, FILM COATED ORAL at 06:22

## 2021-01-01 RX ADMIN — METOPROLOL TARTRATE 12.5 MG: 25 TABLET, FILM COATED ORAL at 06:06

## 2021-01-01 RX ADMIN — Medication 1 G: at 13:13

## 2021-01-01 RX ADMIN — LEVETIRACETAM 500 MG: 500 TABLET ORAL at 17:33

## 2021-01-01 RX ADMIN — SODIUM CHLORIDE 3 G: 900 INJECTION INTRAVENOUS at 18:00

## 2021-01-01 RX ADMIN — OXYCODONE 5 MG: 5 TABLET ORAL at 05:41

## 2021-01-01 RX ADMIN — LACOSAMIDE 100 MG: 100 TABLET, FILM COATED ORAL at 04:57

## 2021-01-01 RX ADMIN — DEXAMETHASONE 4 MG: 4 TABLET ORAL at 05:45

## 2021-01-01 RX ADMIN — INSULIN GLARGINE 15 UNITS: 100 INJECTION, SOLUTION SUBCUTANEOUS at 09:47

## 2021-01-01 RX ADMIN — CHOLECALCIFEROL (VITAMIN D3) 10 MCG (400 UNIT) TABLET 800 UNITS: at 06:18

## 2021-01-01 RX ADMIN — DEXAMETHASONE 4 MG: 4 TABLET ORAL at 13:17

## 2021-01-01 RX ADMIN — LISINOPRIL 2.5 MG: 2.5 TABLET ORAL at 05:45

## 2021-01-01 RX ADMIN — ADENOSINE 6 MG: 3 INJECTION, SOLUTION INTRAVENOUS at 16:35

## 2021-01-01 RX ADMIN — ACETAMINOPHEN 650 MG: 325 TABLET, FILM COATED ORAL at 16:54

## 2021-01-01 RX ADMIN — LACOSAMIDE 100 MG: 100 TABLET, FILM COATED ORAL at 06:04

## 2021-01-01 RX ADMIN — ACETAMINOPHEN 325 MG: 325 TABLET, FILM COATED ORAL at 04:47

## 2021-01-01 RX ADMIN — DEXAMETHASONE 4 MG: 4 TABLET ORAL at 21:10

## 2021-01-01 RX ADMIN — INSULIN HUMAN 2 UNITS: 100 INJECTION, SOLUTION PARENTERAL at 17:21

## 2021-01-01 RX ADMIN — THERA TABS 1 TABLET: TAB at 05:42

## 2021-01-01 RX ADMIN — TAMSULOSIN HYDROCHLORIDE 0.4 MG: 0.4 CAPSULE ORAL at 06:23

## 2021-01-01 RX ADMIN — SODIUM CHLORIDE 1000 ML: 9 INJECTION, SOLUTION INTRAVENOUS at 14:00

## 2021-01-01 RX ADMIN — METOPROLOL SUCCINATE 50 MG: 50 TABLET, EXTENDED RELEASE ORAL at 04:08

## 2021-01-01 RX ADMIN — TAMSULOSIN HYDROCHLORIDE 0.4 MG: 0.4 CAPSULE ORAL at 05:25

## 2021-01-01 RX ADMIN — HYDROCORTISONE 20 MG: 10 TABLET ORAL at 06:00

## 2021-01-01 RX ADMIN — OXYCODONE HYDROCHLORIDE 10 MG: 10 TABLET ORAL at 23:15

## 2021-01-01 RX ADMIN — IOHEXOL 70 ML: 300 INJECTION, SOLUTION INTRAVENOUS at 15:00

## 2021-01-01 RX ADMIN — DOCUSATE SODIUM 50 MG AND SENNOSIDES 8.6 MG 2 TABLET: 8.6; 5 TABLET, FILM COATED ORAL at 05:20

## 2021-01-01 RX ADMIN — MORPHINE SULFATE 2 MG: 4 INJECTION INTRAVENOUS at 02:47

## 2021-01-01 RX ADMIN — Medication 1 G: at 17:15

## 2021-01-01 RX ADMIN — INSULIN HUMAN 2 UNITS: 100 INJECTION, SOLUTION PARENTERAL at 20:50

## 2021-01-01 RX ADMIN — INSULIN HUMAN 3 UNITS: 100 INJECTION, SOLUTION PARENTERAL at 20:57

## 2021-01-01 RX ADMIN — SODIUM CHLORIDE 2.5 MILLION UNITS: 9 INJECTION, SOLUTION INTRAVENOUS at 05:11

## 2021-01-01 RX ADMIN — DEXAMETHASONE 3 MG: 6 TABLET ORAL at 05:08

## 2021-01-01 RX ADMIN — Medication 2 G: at 16:26

## 2021-01-01 RX ADMIN — PROPOFOL 40 MG: 10 INJECTION, EMULSION INTRAVENOUS at 19:00

## 2021-01-01 RX ADMIN — OXYCODONE 5 MG: 5 TABLET ORAL at 09:04

## 2021-01-01 RX ADMIN — POTASSIUM CHLORIDE 40 MEQ: 1500 TABLET, EXTENDED RELEASE ORAL at 13:12

## 2021-01-01 RX ADMIN — ONDANSETRON 4 MG: 2 INJECTION INTRAMUSCULAR; INTRAVENOUS at 04:14

## 2021-01-01 RX ADMIN — DEXAMETHASONE 4 MG: 4 TABLET ORAL at 17:29

## 2021-01-01 RX ADMIN — METOPROLOL TARTRATE 25 MG: 25 TABLET, FILM COATED ORAL at 04:43

## 2021-01-01 RX ADMIN — OXYCODONE 10 MG: 5 TABLET ORAL at 08:38

## 2021-01-01 RX ADMIN — HYDROCORTISONE 10 MG: 10 TABLET ORAL at 18:14

## 2021-01-01 RX ADMIN — ALLOPURINOL 100 MG: 100 TABLET ORAL at 06:53

## 2021-01-01 RX ADMIN — METOPROLOL TARTRATE 12.5 MG: 25 TABLET, FILM COATED ORAL at 05:23

## 2021-01-01 RX ADMIN — ACETAMINOPHEN 650 MG: 325 TABLET, FILM COATED ORAL at 12:38

## 2021-01-01 RX ADMIN — CEFEPIME 2 G: 2 INJECTION, POWDER, FOR SOLUTION INTRAVENOUS at 12:15

## 2021-01-01 RX ADMIN — AMIODARONE HYDROCHLORIDE 0.5 MG/MIN: 1.8 INJECTION, SOLUTION INTRAVENOUS at 18:46

## 2021-01-01 RX ADMIN — ACETAMINOPHEN 650 MG: 325 TABLET, FILM COATED ORAL at 05:25

## 2021-01-01 RX ADMIN — INSULIN HUMAN 2 UNITS: 100 INJECTION, SOLUTION PARENTERAL at 16:24

## 2021-01-01 RX ADMIN — SODIUM CHLORIDE, POTASSIUM CHLORIDE, SODIUM LACTATE AND CALCIUM CHLORIDE 1000 ML: 600; 310; 30; 20 INJECTION, SOLUTION INTRAVENOUS at 11:00

## 2021-01-01 RX ADMIN — VANCOMYCIN HYDROCHLORIDE 2750 MG: 500 INJECTION, POWDER, LYOPHILIZED, FOR SOLUTION INTRAVENOUS at 18:31

## 2021-01-01 RX ADMIN — DOCUSATE SODIUM 50 MG AND SENNOSIDES 8.6 MG 2 TABLET: 8.6; 5 TABLET, FILM COATED ORAL at 16:58

## 2021-01-01 RX ADMIN — METOPROLOL TARTRATE 25 MG: 25 TABLET, FILM COATED ORAL at 11:34

## 2021-01-01 RX ADMIN — PIPERACILLIN AND TAZOBACTAM 3.38 G: 3; .375 INJECTION, POWDER, LYOPHILIZED, FOR SOLUTION INTRAVENOUS; PARENTERAL at 20:55

## 2021-01-01 RX ADMIN — DIBASIC SODIUM PHOSPHATE, MONOBASIC POTASSIUM PHOSPHATE AND MONOBASIC SODIUM PHOSPHATE 500 MG: 852; 155; 130 TABLET ORAL at 14:01

## 2021-01-01 RX ADMIN — FENTANYL CITRATE 50 MCG: 50 INJECTION, SOLUTION INTRAMUSCULAR; INTRAVENOUS at 19:30

## 2021-01-01 RX ADMIN — DOCUSATE SODIUM 50 MG AND SENNOSIDES 8.6 MG 2 TABLET: 8.6; 5 TABLET, FILM COATED ORAL at 17:23

## 2021-01-01 RX ADMIN — DILTIAZEM HYDROCHLORIDE 25 MG: 5 INJECTION INTRAVENOUS at 17:22

## 2021-01-01 RX ADMIN — DEXAMETHASONE 3 MG: 6 TABLET ORAL at 13:18

## 2021-01-01 RX ADMIN — ACETAMINOPHEN 650 MG: 325 TABLET, FILM COATED ORAL at 17:49

## 2021-01-01 RX ADMIN — LEVETIRACETAM 1000 MG: 500 TABLET ORAL at 17:48

## 2021-01-01 RX ADMIN — LACOSAMIDE 100 MG: 100 TABLET, FILM COATED ORAL at 17:21

## 2021-01-01 RX ADMIN — METOPROLOL TARTRATE 25 MG: 25 TABLET, FILM COATED ORAL at 17:20

## 2021-01-01 RX ADMIN — Medication 2 G: at 08:14

## 2021-01-01 RX ADMIN — OXYCODONE 5 MG: 5 TABLET ORAL at 09:30

## 2021-01-01 RX ADMIN — Medication 1 G: at 07:41

## 2021-01-01 RX ADMIN — OXYCODONE HYDROCHLORIDE 10 MG: 10 TABLET ORAL at 21:50

## 2021-01-01 RX ADMIN — INSULIN HUMAN 2 UNITS: 100 INJECTION, SOLUTION PARENTERAL at 21:30

## 2021-01-01 RX ADMIN — INSULIN GLARGINE 5 UNITS: 100 INJECTION, SOLUTION SUBCUTANEOUS at 08:54

## 2021-01-01 RX ADMIN — INSULIN HUMAN 2 UNITS: 100 INJECTION, SOLUTION PARENTERAL at 20:30

## 2021-01-01 RX ADMIN — TAMSULOSIN HYDROCHLORIDE 0.4 MG: 0.4 CAPSULE ORAL at 16:41

## 2021-01-01 RX ADMIN — DILTIAZEM HYDROCHLORIDE 10 MG/HR: 5 INJECTION INTRAVENOUS at 17:06

## 2021-01-01 RX ADMIN — ACETAMINOPHEN 650 MG: 325 TABLET, FILM COATED ORAL at 11:27

## 2021-01-01 RX ADMIN — LISINOPRIL 2.5 MG: 2.5 TABLET ORAL at 05:42

## 2021-01-01 RX ADMIN — DOCUSATE SODIUM 50 MG AND SENNOSIDES 8.6 MG 2 TABLET: 8.6; 5 TABLET, FILM COATED ORAL at 16:41

## 2021-01-01 RX ADMIN — OXYCODONE 5 MG: 5 TABLET ORAL at 10:34

## 2021-01-01 RX ADMIN — SODIUM CHLORIDE 4 MILLION UNITS: 9 INJECTION, SOLUTION INTRAVENOUS at 17:34

## 2021-01-01 RX ADMIN — FLUDROCORTISONE ACETATE 0.1 MG: 0.1 TABLET ORAL at 06:22

## 2021-01-01 RX ADMIN — OXYCODONE HYDROCHLORIDE 10 MG: 10 TABLET ORAL at 03:39

## 2021-01-01 RX ADMIN — HYDROCORTISONE 20 MG: 10 TABLET ORAL at 05:47

## 2021-01-01 RX ADMIN — OXYCODONE HYDROCHLORIDE 10 MG: 10 TABLET ORAL at 21:35

## 2021-01-01 RX ADMIN — TAMSULOSIN HYDROCHLORIDE 0.4 MG: 0.4 CAPSULE ORAL at 05:20

## 2021-01-01 RX ADMIN — DOCUSATE SODIUM 50 MG AND SENNOSIDES 8.6 MG 2 TABLET: 8.6; 5 TABLET, FILM COATED ORAL at 06:16

## 2021-01-01 RX ADMIN — Medication 2 G: at 08:23

## 2021-01-01 RX ADMIN — DEXAMETHASONE 4 MG: 4 TABLET ORAL at 17:55

## 2021-01-01 RX ADMIN — OXYCODONE HYDROCHLORIDE 10 MG: 5 SOLUTION ORAL at 09:05

## 2021-01-01 RX ADMIN — PROCHLORPERAZINE EDISYLATE 10 MG: 5 INJECTION INTRAMUSCULAR; INTRAVENOUS at 10:49

## 2021-01-01 RX ADMIN — CEFEPIME 2 G: 2 INJECTION, POWDER, FOR SOLUTION INTRAVENOUS at 16:52

## 2021-01-01 RX ADMIN — METOPROLOL TARTRATE 12.5 MG: 25 TABLET, FILM COATED ORAL at 16:57

## 2021-01-01 RX ADMIN — METOPROLOL TARTRATE 25 MG: 25 TABLET, FILM COATED ORAL at 05:34

## 2021-01-01 RX ADMIN — INSULIN HUMAN 2 UNITS: 100 INJECTION, SOLUTION PARENTERAL at 21:02

## 2021-01-01 RX ADMIN — DEXAMETHASONE 2 MG: 4 TABLET ORAL at 21:01

## 2021-01-01 RX ADMIN — TAMSULOSIN HYDROCHLORIDE 0.4 MG: 0.4 CAPSULE ORAL at 17:12

## 2021-01-01 RX ADMIN — ONDANSETRON 4 MG: 2 INJECTION INTRAMUSCULAR; INTRAVENOUS at 00:34

## 2021-01-01 RX ADMIN — METOPROLOL TARTRATE 12.5 MG: 25 TABLET, FILM COATED ORAL at 05:40

## 2021-01-01 RX ADMIN — SODIUM CHLORIDE 4 MILLION UNITS: 9 INJECTION, SOLUTION INTRAVENOUS at 14:30

## 2021-01-01 RX ADMIN — POLYETHYLENE GLYCOL 3350 1 PACKET: 17 POWDER, FOR SOLUTION ORAL at 05:18

## 2021-01-01 RX ADMIN — DIBASIC SODIUM PHOSPHATE, MONOBASIC POTASSIUM PHOSPHATE AND MONOBASIC SODIUM PHOSPHATE 500 MG: 852; 155; 130 TABLET ORAL at 17:36

## 2021-01-01 RX ADMIN — HYDROCORTISONE 20 MG: 10 TABLET ORAL at 05:09

## 2021-01-01 RX ADMIN — DEXAMETHASONE 3 MG: 6 TABLET ORAL at 12:32

## 2021-01-01 RX ADMIN — ACETAMINOPHEN 325 MG: 325 TABLET, FILM COATED ORAL at 18:13

## 2021-01-01 RX ADMIN — DEXAMETHASONE 2 MG: 4 TABLET ORAL at 12:57

## 2021-01-01 RX ADMIN — INSULIN HUMAN 2 UNITS: 100 INJECTION, SOLUTION PARENTERAL at 20:55

## 2021-01-01 RX ADMIN — INSULIN GLARGINE 5 UNITS: 100 INJECTION, SOLUTION SUBCUTANEOUS at 13:30

## 2021-01-01 RX ADMIN — ACETAMINOPHEN 650 MG: 325 TABLET, FILM COATED ORAL at 23:41

## 2021-01-01 RX ADMIN — INSULIN GLARGINE 15 UNITS: 100 INJECTION, SOLUTION SUBCUTANEOUS at 08:52

## 2021-01-01 RX ADMIN — POLYETHYLENE GLYCOL 3350 1 PACKET: 17 POWDER, FOR SOLUTION ORAL at 12:40

## 2021-01-01 RX ADMIN — POTASSIUM CHLORIDE AND SODIUM CHLORIDE: 900; 150 INJECTION, SOLUTION INTRAVENOUS at 11:09

## 2021-01-01 RX ADMIN — ACETAMINOPHEN 650 MG: 325 TABLET, FILM COATED ORAL at 11:52

## 2021-01-01 RX ADMIN — OXYCODONE HYDROCHLORIDE 10 MG: 10 TABLET ORAL at 10:37

## 2021-01-01 RX ADMIN — DOCUSATE SODIUM 50 MG AND SENNOSIDES 8.6 MG 2 TABLET: 8.6; 5 TABLET, FILM COATED ORAL at 05:25

## 2021-01-01 RX ADMIN — Medication 1 G: at 11:09

## 2021-01-01 RX ADMIN — OXYCODONE HYDROCHLORIDE 10 MG: 10 TABLET ORAL at 20:32

## 2021-01-01 RX ADMIN — OXYCODONE 5 MG: 5 TABLET ORAL at 20:47

## 2021-01-01 RX ADMIN — OXYCODONE 5 MG: 5 TABLET ORAL at 05:24

## 2021-01-01 RX ADMIN — LEVETIRACETAM 500 MG: 500 TABLET ORAL at 05:33

## 2021-01-01 RX ADMIN — DIBASIC SODIUM PHOSPHATE, MONOBASIC POTASSIUM PHOSPHATE AND MONOBASIC SODIUM PHOSPHATE 250 MG: 852; 155; 130 TABLET ORAL at 07:00

## 2021-01-01 RX ADMIN — LISINOPRIL 2.5 MG: 5 TABLET ORAL at 05:41

## 2021-01-01 RX ADMIN — AMIODARONE HYDROCHLORIDE 400 MG: 200 TABLET ORAL at 17:20

## 2021-01-01 RX ADMIN — DOCUSATE SODIUM 50 MG AND SENNOSIDES 8.6 MG 2 TABLET: 8.6; 5 TABLET, FILM COATED ORAL at 05:42

## 2021-01-01 RX ADMIN — DEXTROSE MONOHYDRATE: 50 INJECTION, SOLUTION INTRAVENOUS at 08:06

## 2021-01-01 RX ADMIN — HEPARIN SODIUM 3600 UNITS: 1000 INJECTION, SOLUTION INTRAVENOUS; SUBCUTANEOUS at 20:32

## 2021-01-01 RX ADMIN — FLUDROCORTISONE ACETATE 0.1 MG: 0.1 TABLET ORAL at 05:41

## 2021-01-01 RX ADMIN — SODIUM CHLORIDE 3 G: 900 INJECTION INTRAVENOUS at 05:18

## 2021-01-01 RX ADMIN — DOCUSATE SODIUM 50 MG AND SENNOSIDES 8.6 MG 2 TABLET: 8.6; 5 TABLET, FILM COATED ORAL at 18:14

## 2021-01-01 RX ADMIN — Medication 1 G: at 17:49

## 2021-01-01 RX ADMIN — ACETAMINOPHEN 650 MG: 325 TABLET, FILM COATED ORAL at 12:18

## 2021-01-01 RX ADMIN — HYDROCORTISONE 10 MG: 10 TABLET ORAL at 17:06

## 2021-01-01 RX ADMIN — METRONIDAZOLE 500 MG: 500 TABLET ORAL at 14:57

## 2021-01-01 RX ADMIN — INSULIN HUMAN 2 UNITS: 100 INJECTION, SOLUTION PARENTERAL at 20:29

## 2021-01-01 RX ADMIN — FLUDROCORTISONE ACETATE 0.1 MG: 0.1 TABLET ORAL at 12:18

## 2021-01-01 RX ADMIN — HEPARIN SODIUM 20 UNITS/KG/HR: 5000 INJECTION, SOLUTION INTRAVENOUS at 00:19

## 2021-01-01 RX ADMIN — MORPHINE SULFATE 2 MG: 4 INJECTION INTRAVENOUS at 23:54

## 2021-01-01 RX ADMIN — OXYCODONE HYDROCHLORIDE 10 MG: 10 TABLET ORAL at 17:49

## 2021-01-01 RX ADMIN — THERA TABS 1 TABLET: TAB at 05:15

## 2021-01-01 RX ADMIN — SODIUM CHLORIDE 2.5 MILLION UNITS: 9 INJECTION, SOLUTION INTRAVENOUS at 10:41

## 2021-01-01 RX ADMIN — METOPROLOL TARTRATE 12.5 MG: 25 TABLET, FILM COATED ORAL at 18:00

## 2021-01-01 RX ADMIN — FAMOTIDINE 20 MG: 20 TABLET, FILM COATED ORAL at 06:00

## 2021-01-01 RX ADMIN — FUROSEMIDE 40 MG: 40 TABLET ORAL at 05:41

## 2021-01-01 RX ADMIN — OXYCODONE 5 MG: 5 TABLET ORAL at 20:22

## 2021-01-01 RX ADMIN — DEXAMETHASONE 4 MG: 4 TABLET ORAL at 04:58

## 2021-01-01 RX ADMIN — ACETAMINOPHEN 1000 MG: 500 TABLET, FILM COATED ORAL at 06:27

## 2021-01-01 RX ADMIN — LEVETIRACETAM 500 MG: 500 TABLET ORAL at 05:21

## 2021-01-01 RX ADMIN — METOPROLOL TARTRATE 25 MG: 25 TABLET, FILM COATED ORAL at 05:13

## 2021-01-01 RX ADMIN — SODIUM CHLORIDE 3 G: 900 INJECTION INTRAVENOUS at 23:31

## 2021-01-01 RX ADMIN — ACETAMINOPHEN 650 MG: 325 TABLET, FILM COATED ORAL at 00:03

## 2021-01-01 RX ADMIN — LEVETIRACETAM 500 MG: 100 SOLUTION ORAL at 05:42

## 2021-01-01 RX ADMIN — DEXMEDETOMIDINE 1 MCG/KG/HR: 200 INJECTION, SOLUTION INTRAVENOUS at 22:19

## 2021-01-01 RX ADMIN — METOPROLOL TARTRATE 12.5 MG: 25 TABLET, FILM COATED ORAL at 16:54

## 2021-01-01 RX ADMIN — OXYCODONE HYDROCHLORIDE 10 MG: 10 TABLET ORAL at 10:33

## 2021-01-01 RX ADMIN — INSULIN HUMAN 2 UNITS: 100 INJECTION, SOLUTION PARENTERAL at 11:45

## 2021-01-01 RX ADMIN — SODIUM CHLORIDE 3 G: 900 INJECTION INTRAVENOUS at 06:04

## 2021-01-01 RX ADMIN — LABETALOL HYDROCHLORIDE 10 MG: 5 INJECTION, SOLUTION INTRAVENOUS at 05:13

## 2021-01-01 RX ADMIN — OXYCODONE 5 MG: 5 TABLET ORAL at 20:52

## 2021-01-01 RX ADMIN — SODIUM CHLORIDE 1000 ML: 9 INJECTION, SOLUTION INTRAVENOUS at 20:21

## 2021-01-01 RX ADMIN — FENTANYL CITRATE 2500 MCG: 50 INJECTION, SOLUTION INTRAMUSCULAR; INTRAVENOUS at 23:52

## 2021-01-01 RX ADMIN — TAMSULOSIN HYDROCHLORIDE 0.4 MG: 0.4 CAPSULE ORAL at 06:14

## 2021-01-01 RX ADMIN — LEVETIRACETAM 500 MG: 500 TABLET ORAL at 04:44

## 2021-01-01 RX ADMIN — MORPHINE SULFATE 4 MG: 4 INJECTION INTRAVENOUS at 18:09

## 2021-01-01 RX ADMIN — LEVETIRACETAM INJECTION 500 MG: 5 INJECTION INTRAVENOUS at 17:18

## 2021-01-01 RX ADMIN — SODIUM PHOSPHATE, MONOBASIC, MONOHYDRATE AND SODIUM PHOSPHATE, DIBASIC, ANHYDROUS 30 MMOL: 276; 142 INJECTION, SOLUTION INTRAVENOUS at 08:00

## 2021-01-01 RX ADMIN — Medication 1 G: at 17:16

## 2021-01-01 RX ADMIN — CEFTRIAXONE SODIUM 2 G: 2 INJECTION, POWDER, FOR SOLUTION INTRAMUSCULAR; INTRAVENOUS at 04:42

## 2021-01-01 RX ADMIN — HALOPERIDOL LACTATE 1 MG: 5 INJECTION, SOLUTION INTRAMUSCULAR at 09:26

## 2021-01-01 RX ADMIN — OXYCODONE 5 MG: 5 TABLET ORAL at 09:40

## 2021-01-01 RX ADMIN — LEVETIRACETAM 500 MG: 500 TABLET ORAL at 17:40

## 2021-01-01 RX ADMIN — DEXAMETHASONE 2 MG: 4 TABLET ORAL at 17:49

## 2021-01-01 RX ADMIN — HEPARIN SODIUM 24 UNITS/KG/HR: 5000 INJECTION, SOLUTION INTRAVENOUS at 04:25

## 2021-01-01 RX ADMIN — Medication 2 G: at 12:24

## 2021-01-01 RX ADMIN — METOPROLOL TARTRATE 25 MG: 25 TABLET, FILM COATED ORAL at 05:45

## 2021-01-01 RX ADMIN — Medication 1 G: at 17:20

## 2021-01-01 RX ADMIN — DEXMEDETOMIDINE HYDROCHLORIDE 0.4 MCG/KG/HR: 100 INJECTION, SOLUTION INTRAVENOUS at 08:00

## 2021-01-01 RX ADMIN — FAMOTIDINE 20 MG: 20 TABLET, FILM COATED ORAL at 11:34

## 2021-01-01 RX ADMIN — Medication 2 G: at 16:27

## 2021-01-01 RX ADMIN — INSULIN GLARGINE 15 UNITS: 100 INJECTION, SOLUTION SUBCUTANEOUS at 09:45

## 2021-01-01 RX ADMIN — CEFAZOLIN 2 G: 330 INJECTION, POWDER, FOR SOLUTION INTRAMUSCULAR; INTRAVENOUS at 07:09

## 2021-01-01 RX ADMIN — HYDROCORTISONE 10 MG: 10 TABLET ORAL at 15:20

## 2021-01-01 RX ADMIN — METOPROLOL TARTRATE 12.5 MG: 25 TABLET, FILM COATED ORAL at 05:25

## 2021-01-01 RX ADMIN — IPRATROPIUM BROMIDE AND ALBUTEROL SULFATE 3 ML: .5; 3 SOLUTION RESPIRATORY (INHALATION) at 17:02

## 2021-01-01 RX ADMIN — DEXAMETHASONE 4 MG: 4 TABLET ORAL at 05:03

## 2021-01-01 RX ADMIN — DEXAMETHASONE 4 MG: 4 TABLET ORAL at 17:04

## 2021-01-01 RX ADMIN — METOPROLOL SUCCINATE 50 MG: 50 TABLET, EXTENDED RELEASE ORAL at 04:10

## 2021-01-01 RX ADMIN — PIPERACILLIN AND TAZOBACTAM 3.38 G: 3; .375 INJECTION, POWDER, LYOPHILIZED, FOR SOLUTION INTRAVENOUS; PARENTERAL at 06:14

## 2021-01-01 RX ADMIN — DEXAMETHASONE 3 MG: 6 TABLET ORAL at 05:27

## 2021-01-01 RX ADMIN — ENOXAPARIN SODIUM 100 MG: 100 INJECTION SUBCUTANEOUS at 17:11

## 2021-01-01 RX ADMIN — DEXAMETHASONE 4 MG: 4 TABLET ORAL at 21:25

## 2021-01-01 RX ADMIN — DEXAMETHASONE SODIUM PHOSPHATE 4 MG: 4 INJECTION, SOLUTION INTRA-ARTICULAR; INTRALESIONAL; INTRAMUSCULAR; INTRAVENOUS; SOFT TISSUE at 17:10

## 2021-01-01 RX ADMIN — IPRATROPIUM BROMIDE 0.5 MG: 0.5 SOLUTION RESPIRATORY (INHALATION) at 21:25

## 2021-01-01 RX ADMIN — SODIUM CHLORIDE 4 MILLION UNITS: 9 INJECTION, SOLUTION INTRAVENOUS at 09:57

## 2021-01-01 RX ADMIN — ACETAMINOPHEN 325 MG: 325 TABLET, FILM COATED ORAL at 00:59

## 2021-01-01 RX ADMIN — LEVETIRACETAM 500 MG: 500 TABLET ORAL at 05:52

## 2021-01-01 RX ADMIN — METRONIDAZOLE 500 MG: 500 TABLET ORAL at 21:09

## 2021-01-01 RX ADMIN — TAMSULOSIN HYDROCHLORIDE 0.4 MG: 0.4 CAPSULE ORAL at 18:14

## 2021-01-01 RX ADMIN — AMIODARONE HYDROCHLORIDE 150 MG: 1.5 INJECTION, SOLUTION INTRAVENOUS at 19:26

## 2021-01-01 RX ADMIN — OXYCODONE HYDROCHLORIDE 10 MG: 10 TABLET ORAL at 20:39

## 2021-01-01 RX ADMIN — FUROSEMIDE 40 MG: 40 TABLET ORAL at 15:22

## 2021-01-01 RX ADMIN — INSULIN HUMAN 2 UNITS: 100 INJECTION, SOLUTION PARENTERAL at 20:14

## 2021-01-01 RX ADMIN — METOPROLOL SUCCINATE 50 MG: 50 TABLET, EXTENDED RELEASE ORAL at 05:40

## 2021-01-01 RX ADMIN — LACOSAMIDE 100 MG: 100 TABLET, FILM COATED ORAL at 05:52

## 2021-01-01 RX ADMIN — ACETAMINOPHEN 650 MG: 325 TABLET, FILM COATED ORAL at 06:19

## 2021-01-01 RX ADMIN — FENTANYL CITRATE 50 MCG: 50 INJECTION, SOLUTION INTRAMUSCULAR; INTRAVENOUS at 09:14

## 2021-01-01 RX ADMIN — CEFTRIAXONE SODIUM 2 G: 2 INJECTION, POWDER, FOR SOLUTION INTRAMUSCULAR; INTRAVENOUS at 04:09

## 2021-01-01 RX ADMIN — INSULIN HUMAN 1 UNITS: 100 INJECTION, SOLUTION PARENTERAL at 17:28

## 2021-01-01 RX ADMIN — MAGNESIUM GLUCONATE 500 MG ORAL TABLET 400 MG: 500 TABLET ORAL at 13:12

## 2021-01-01 RX ADMIN — METRONIDAZOLE 500 MG: 500 TABLET ORAL at 04:08

## 2021-01-01 RX ADMIN — OXYCODONE HYDROCHLORIDE 10 MG: 10 TABLET ORAL at 22:00

## 2021-01-01 RX ADMIN — LISINOPRIL 2.5 MG: 5 TABLET ORAL at 04:43

## 2021-01-01 RX ADMIN — CEFAZOLIN SODIUM 2 G: 2 INJECTION, SOLUTION INTRAVENOUS at 23:19

## 2021-01-01 RX ADMIN — METRONIDAZOLE 500 MG: 500 TABLET ORAL at 14:51

## 2021-01-01 RX ADMIN — INSULIN HUMAN 2 UNITS: 100 INJECTION, SOLUTION PARENTERAL at 17:16

## 2021-01-01 RX ADMIN — LEVETIRACETAM 1000 MG: 500 TABLET ORAL at 05:19

## 2021-01-01 RX ADMIN — SODIUM CHLORIDE 500 ML: 9 INJECTION, SOLUTION INTRAVENOUS at 11:06

## 2021-01-01 RX ADMIN — WARFARIN SODIUM 2 MG: 2 TABLET ORAL at 21:32

## 2021-01-01 RX ADMIN — ENOXAPARIN SODIUM 100 MG: 100 INJECTION SUBCUTANEOUS at 04:43

## 2021-01-01 RX ADMIN — ACETAMINOPHEN 650 MG: 325 TABLET, FILM COATED ORAL at 17:21

## 2021-01-01 RX ADMIN — DEXAMETHASONE 2 MG: 4 TABLET ORAL at 05:23

## 2021-01-01 RX ADMIN — DEXAMETHASONE 4 MG: 4 TABLET ORAL at 12:06

## 2021-01-01 RX ADMIN — MAGNESIUM HYDROXIDE 30 ML: 400 SUSPENSION ORAL at 06:10

## 2021-01-01 RX ADMIN — VANCOMYCIN HYDROCHLORIDE 1500 MG: 500 INJECTION, POWDER, LYOPHILIZED, FOR SOLUTION INTRAVENOUS at 09:16

## 2021-01-01 RX ADMIN — DEXMEDETOMIDINE 0.8 MCG/KG/HR: 200 INJECTION, SOLUTION INTRAVENOUS at 00:05

## 2021-01-01 RX ADMIN — DEXAMETHASONE SODIUM PHOSPHATE 4 MG: 4 INJECTION, SOLUTION INTRA-ARTICULAR; INTRALESIONAL; INTRAMUSCULAR; INTRAVENOUS; SOFT TISSUE at 05:12

## 2021-01-01 RX ADMIN — CEFTRIAXONE SODIUM 2 G: 2 INJECTION, POWDER, FOR SOLUTION INTRAMUSCULAR; INTRAVENOUS at 14:01

## 2021-01-01 RX ADMIN — INSULIN HUMAN 2 UNITS: 100 INJECTION, SOLUTION PARENTERAL at 17:09

## 2021-01-01 RX ADMIN — FAMOTIDINE 20 MG: 20 TABLET ORAL at 04:42

## 2021-01-01 RX ADMIN — Medication 2 G: at 18:01

## 2021-01-01 RX ADMIN — Medication 2 G: at 07:13

## 2021-01-01 RX ADMIN — DEXAMETHASONE 2 MG: 4 TABLET ORAL at 13:13

## 2021-01-01 RX ADMIN — LACOSAMIDE 100 MG: 100 TABLET, FILM COATED ORAL at 11:35

## 2021-01-01 RX ADMIN — DEXAMETHASONE 3 MG: 6 TABLET ORAL at 05:21

## 2021-01-01 RX ADMIN — OXYCODONE HYDROCHLORIDE 10 MG: 10 TABLET ORAL at 10:52

## 2021-01-01 RX ADMIN — AMIODARONE HYDROCHLORIDE 400 MG: 200 TABLET ORAL at 17:59

## 2021-01-01 RX ADMIN — ACETAMINOPHEN 325 MG: 325 TABLET, FILM COATED ORAL at 17:12

## 2021-01-01 RX ADMIN — DEXAMETHASONE 3 MG: 6 TABLET ORAL at 05:32

## 2021-01-01 RX ADMIN — IOHEXOL 100 ML: 350 INJECTION, SOLUTION INTRAVENOUS at 16:45

## 2021-01-01 RX ADMIN — DEXMEDETOMIDINE HYDROCHLORIDE 0.4 MCG/KG/HR: 100 INJECTION, SOLUTION INTRAVENOUS at 23:24

## 2021-01-01 RX ADMIN — ALLOPURINOL 100 MG: 100 TABLET ORAL at 05:07

## 2021-01-01 RX ADMIN — ALLOPURINOL 100 MG: 100 TABLET ORAL at 04:08

## 2021-01-01 RX ADMIN — LEVETIRACETAM INJECTION 500 MG: 5 INJECTION INTRAVENOUS at 05:12

## 2021-01-01 RX ADMIN — METOPROLOL TARTRATE 12.5 MG: 25 TABLET, FILM COATED ORAL at 05:41

## 2021-01-01 RX ADMIN — LISINOPRIL 2.5 MG: 2.5 TABLET ORAL at 05:26

## 2021-01-01 RX ADMIN — ONDANSETRON 4 MG: 4 TABLET, ORALLY DISINTEGRATING ORAL at 00:09

## 2021-01-01 RX ADMIN — SODIUM CHLORIDE: 9 INJECTION, SOLUTION INTRAVENOUS at 05:12

## 2021-01-01 RX ADMIN — DOCUSATE SODIUM 50 MG AND SENNOSIDES 8.6 MG 2 TABLET: 8.6; 5 TABLET, FILM COATED ORAL at 17:16

## 2021-01-01 RX ADMIN — LACOSAMIDE 100 MG: 100 TABLET, FILM COATED ORAL at 18:14

## 2021-01-01 RX ADMIN — HYDROCORTISONE 20 MG: 10 TABLET ORAL at 04:47

## 2021-01-01 RX ADMIN — DIBASIC SODIUM PHOSPHATE, MONOBASIC POTASSIUM PHOSPHATE AND MONOBASIC SODIUM PHOSPHATE 250 MG: 852; 155; 130 TABLET ORAL at 05:42

## 2021-01-01 RX ADMIN — Medication 2 G: at 17:49

## 2021-01-01 RX ADMIN — DEXAMETHASONE 4 MG: 4 TABLET ORAL at 20:30

## 2021-01-01 RX ADMIN — ENOXAPARIN SODIUM 100 MG: 100 INJECTION SUBCUTANEOUS at 06:54

## 2021-01-01 RX ADMIN — CEFEPIME 2 G: 2 INJECTION, POWDER, FOR SOLUTION INTRAVENOUS at 05:10

## 2021-01-01 RX ADMIN — ENOXAPARIN SODIUM 100 MG: 100 INJECTION SUBCUTANEOUS at 05:59

## 2021-01-01 RX ADMIN — ACETAMINOPHEN 650 MG: 325 TABLET, FILM COATED ORAL at 23:14

## 2021-01-01 RX ADMIN — ONDANSETRON 4 MG: 2 INJECTION INTRAMUSCULAR; INTRAVENOUS at 06:06

## 2021-01-01 RX ADMIN — SODIUM CHLORIDE, POTASSIUM CHLORIDE, SODIUM LACTATE AND CALCIUM CHLORIDE: 600; 310; 30; 20 INJECTION, SOLUTION INTRAVENOUS at 06:55

## 2021-01-01 RX ADMIN — MORPHINE SULFATE 2 MG: 4 INJECTION INTRAVENOUS at 15:23

## 2021-01-01 RX ADMIN — INSULIN HUMAN 2 UNITS: 100 INJECTION, SOLUTION PARENTERAL at 16:49

## 2021-01-01 RX ADMIN — METOPROLOL TARTRATE 12.5 MG: 25 TABLET, FILM COATED ORAL at 06:27

## 2021-01-01 RX ADMIN — MORPHINE SULFATE 2 MG: 4 INJECTION INTRAVENOUS at 10:44

## 2021-01-01 RX ADMIN — FENTANYL CITRATE 250 MCG: 50 INJECTION, SOLUTION INTRAMUSCULAR; INTRAVENOUS at 07:08

## 2021-01-01 RX ADMIN — DIBASIC SODIUM PHOSPHATE, MONOBASIC POTASSIUM PHOSPHATE AND MONOBASIC SODIUM PHOSPHATE 250 MG: 852; 155; 130 TABLET ORAL at 21:58

## 2021-01-01 RX ADMIN — AMIODARONE HYDROCHLORIDE 400 MG: 200 TABLET ORAL at 05:00

## 2021-01-01 RX ADMIN — INSULIN HUMAN 1 UNITS: 100 INJECTION, SOLUTION PARENTERAL at 21:37

## 2021-01-01 RX ADMIN — DEXAMETHASONE 4 MG: 4 TABLET ORAL at 06:23

## 2021-01-01 RX ADMIN — COLCHICINE 0.6 MG: 0.6 TABLET, FILM COATED ORAL at 09:10

## 2021-01-01 RX ADMIN — INSULIN HUMAN 2 UNITS: 100 INJECTION, SOLUTION PARENTERAL at 06:37

## 2021-01-01 RX ADMIN — ALLOPURINOL 100 MG: 100 TABLET ORAL at 05:21

## 2021-01-01 RX ADMIN — DEXAMETHASONE 3 MG: 6 TABLET ORAL at 23:07

## 2021-01-01 RX ADMIN — INSULIN HUMAN 3 UNITS: 100 INJECTION, SOLUTION PARENTERAL at 12:01

## 2021-01-01 RX ADMIN — METOPROLOL TARTRATE 12.5 MG: 25 TABLET, FILM COATED ORAL at 16:24

## 2021-01-01 RX ADMIN — METOPROLOL TARTRATE 25 MG: 25 TABLET, FILM COATED ORAL at 17:23

## 2021-01-01 RX ADMIN — AMIODARONE HYDROCHLORIDE 1 MG/MIN: 1.8 INJECTION, SOLUTION INTRAVENOUS at 00:37

## 2021-01-01 RX ADMIN — OXYCODONE 10 MG: 5 TABLET ORAL at 23:18

## 2021-01-01 RX ADMIN — OXYCODONE HYDROCHLORIDE 10 MG: 10 TABLET ORAL at 07:59

## 2021-01-01 RX ADMIN — INSULIN HUMAN 2 UNITS: 100 INJECTION, SOLUTION PARENTERAL at 07:06

## 2021-01-01 RX ADMIN — DEXMEDETOMIDINE 1 MCG/KG/HR: 200 INJECTION, SOLUTION INTRAVENOUS at 05:04

## 2021-01-01 RX ADMIN — OXYCODONE HYDROCHLORIDE 10 MG: 10 TABLET ORAL at 09:16

## 2021-01-01 RX ADMIN — INSULIN HUMAN 3 UNITS: 100 INJECTION, SOLUTION PARENTERAL at 20:21

## 2021-01-01 RX ADMIN — CEFEPIME 2 G: 2 INJECTION, POWDER, FOR SOLUTION INTRAVENOUS at 22:30

## 2021-01-01 RX ADMIN — OXYCODONE HYDROCHLORIDE 10 MG: 10 TABLET ORAL at 05:39

## 2021-01-01 RX ADMIN — OXYCODONE HYDROCHLORIDE 10 MG: 10 TABLET ORAL at 13:41

## 2021-01-01 RX ADMIN — AMIODARONE HYDROCHLORIDE 400 MG: 200 TABLET ORAL at 17:07

## 2021-01-01 RX ADMIN — LEVETIRACETAM 500 MG: 500 TABLET ORAL at 05:07

## 2021-01-01 RX ADMIN — OXYCODONE 5 MG: 5 TABLET ORAL at 05:34

## 2021-01-01 RX ADMIN — ACETAMINOPHEN 1000 MG: 500 TABLET, FILM COATED ORAL at 13:04

## 2021-01-01 RX ADMIN — INSULIN HUMAN 2 UNITS: 100 INJECTION, SOLUTION PARENTERAL at 18:13

## 2021-01-01 RX ADMIN — METOPROLOL TARTRATE 12.5 MG: 25 TABLET, FILM COATED ORAL at 17:22

## 2021-01-01 RX ADMIN — DEXAMETHASONE 4 MG: 4 TABLET ORAL at 13:28

## 2021-01-01 RX ADMIN — SODIUM CHLORIDE: 9 INJECTION, SOLUTION INTRAVENOUS at 16:08

## 2021-01-01 RX ADMIN — LISINOPRIL 2.5 MG: 2.5 TABLET ORAL at 05:27

## 2021-01-01 RX ADMIN — OXYCODONE HYDROCHLORIDE 10 MG: 10 TABLET ORAL at 07:40

## 2021-01-01 RX ADMIN — ALLOPURINOL 100 MG: 100 TABLET ORAL at 05:28

## 2021-01-01 RX ADMIN — OXYCODONE 10 MG: 5 TABLET ORAL at 05:00

## 2021-01-01 RX ADMIN — DEXAMETHASONE 4 MG: 4 TABLET ORAL at 13:30

## 2021-01-01 RX ADMIN — INSULIN HUMAN 1 UNITS: 100 INJECTION, SOLUTION PARENTERAL at 21:11

## 2021-01-01 RX ADMIN — DEXAMETHASONE 3 MG: 6 TABLET ORAL at 21:05

## 2021-01-01 RX ADMIN — DEXAMETHASONE 3 MG: 6 TABLET ORAL at 15:08

## 2021-01-01 RX ADMIN — DOCUSATE SODIUM 50 MG AND SENNOSIDES 8.6 MG 2 TABLET: 8.6; 5 TABLET, FILM COATED ORAL at 17:55

## 2021-01-01 RX ADMIN — OXYCODONE HYDROCHLORIDE 10 MG: 10 TABLET ORAL at 06:18

## 2021-01-01 RX ADMIN — DEXAMETHASONE 2 MG: 4 TABLET ORAL at 05:18

## 2021-01-01 RX ADMIN — DEXTROSE MONOHYDRATE: 50 INJECTION, SOLUTION INTRAVENOUS at 11:27

## 2021-01-01 RX ADMIN — DEXAMETHASONE 4 MG: 4 TABLET ORAL at 17:48

## 2021-01-01 RX ADMIN — DEXAMETHASONE 3 MG: 6 TABLET ORAL at 21:08

## 2021-01-01 RX ADMIN — MORPHINE SULFATE 2 MG: 4 INJECTION INTRAVENOUS at 20:44

## 2021-01-01 RX ADMIN — POLYETHYLENE GLYCOL 3350 1 PACKET: 17 POWDER, FOR SOLUTION ORAL at 12:32

## 2021-01-01 RX ADMIN — LEVETIRACETAM 500 MG: 500 TABLET ORAL at 17:17

## 2021-01-01 RX ADMIN — OXYCODONE HYDROCHLORIDE 10 MG: 10 TABLET ORAL at 13:13

## 2021-01-01 RX ADMIN — ALLOPURINOL 100 MG: 100 TABLET ORAL at 05:51

## 2021-01-01 RX ADMIN — ACETAMINOPHEN 650 MG: 325 TABLET, FILM COATED ORAL at 02:07

## 2021-01-01 RX ADMIN — METOPROLOL TARTRATE 25 MG: 25 TABLET, FILM COATED ORAL at 06:00

## 2021-01-01 RX ADMIN — TAMSULOSIN HYDROCHLORIDE 0.4 MG: 0.4 CAPSULE ORAL at 05:21

## 2021-01-01 RX ADMIN — DOCUSATE SODIUM 50 MG AND SENNOSIDES 8.6 MG 2 TABLET: 8.6; 5 TABLET, FILM COATED ORAL at 18:01

## 2021-01-01 RX ADMIN — INSULIN HUMAN 5 UNITS: 100 INJECTION, SOLUTION PARENTERAL at 20:43

## 2021-01-01 RX ADMIN — LEVETIRACETAM 500 MG: 500 TABLET ORAL at 18:06

## 2021-01-01 RX ADMIN — ACETAMINOPHEN 650 MG: 325 TABLET, FILM COATED ORAL at 17:36

## 2021-01-01 RX ADMIN — INSULIN HUMAN 2 UNITS: 100 INJECTION, SOLUTION PARENTERAL at 09:53

## 2021-01-01 RX ADMIN — ACETAMINOPHEN 650 MG: 325 TABLET ORAL at 00:41

## 2021-01-01 RX ADMIN — CHOLECALCIFEROL (VITAMIN D3) 10 MCG (400 UNIT) TABLET 800 UNITS: at 05:51

## 2021-01-01 RX ADMIN — LEVETIRACETAM 1000 MG: 500 TABLET ORAL at 17:20

## 2021-01-01 RX ADMIN — Medication 1 G: at 08:03

## 2021-01-01 RX ADMIN — FENTANYL CITRATE 50 MCG: 50 INJECTION, SOLUTION INTRAMUSCULAR; INTRAVENOUS at 09:23

## 2021-01-01 RX ADMIN — DEXMEDETOMIDINE 0.6 MCG/KG/HR: 200 INJECTION, SOLUTION INTRAVENOUS at 11:00

## 2021-01-01 RX ADMIN — INSULIN HUMAN 1 UNITS: 100 INJECTION, SOLUTION PARENTERAL at 12:32

## 2021-01-01 RX ADMIN — PROTHROMBIN, COAGULATION FACTOR VII HUMAN, COAGULATION FACTOR IX HUMAN, COAGULATION FACTOR X HUMAN, PROTEIN C, PROTEIN S HUMAN, AND WATER 2000 UNITS: KIT at 12:20

## 2021-01-01 RX ADMIN — ACETAMINOPHEN 650 MG: 325 TABLET, FILM COATED ORAL at 04:44

## 2021-01-01 RX ADMIN — ACETAMINOPHEN 650 MG: 325 TABLET, FILM COATED ORAL at 05:01

## 2021-01-01 RX ADMIN — METRONIDAZOLE 500 MG: 500 TABLET ORAL at 21:32

## 2021-01-01 RX ADMIN — OXYCODONE 10 MG: 5 TABLET ORAL at 02:35

## 2021-01-01 RX ADMIN — TAMSULOSIN HYDROCHLORIDE 0.4 MG: 0.4 CAPSULE ORAL at 16:39

## 2021-01-01 RX ADMIN — Medication 2 G: at 11:33

## 2021-01-01 RX ADMIN — WARFARIN SODIUM 1.5 MG: 3 TABLET ORAL at 17:56

## 2021-01-01 RX ADMIN — AMIODARONE HYDROCHLORIDE 400 MG: 200 TABLET ORAL at 05:41

## 2021-01-01 RX ADMIN — Medication 2 G: at 11:45

## 2021-01-01 RX ADMIN — LACOSAMIDE 100 MG: 100 TABLET, FILM COATED ORAL at 16:51

## 2021-01-01 RX ADMIN — Medication 2 G: at 07:25

## 2021-01-01 RX ADMIN — ONDANSETRON 4 MG: 2 INJECTION INTRAMUSCULAR; INTRAVENOUS at 18:09

## 2021-01-01 RX ADMIN — DOCUSATE SODIUM 50 MG AND SENNOSIDES 8.6 MG 2 TABLET: 8.6; 5 TABLET, FILM COATED ORAL at 06:20

## 2021-01-01 RX ADMIN — AMIODARONE HYDROCHLORIDE 400 MG: 200 TABLET ORAL at 17:37

## 2021-01-01 RX ADMIN — OXYCODONE HYDROCHLORIDE 10 MG: 10 TABLET ORAL at 23:09

## 2021-01-01 RX ADMIN — OXYCODONE 5 MG: 5 TABLET ORAL at 17:24

## 2021-01-01 RX ADMIN — MIDAZOLAM HYDROCHLORIDE 2 MG: 1 INJECTION, SOLUTION INTRAMUSCULAR; INTRAVENOUS at 08:27

## 2021-01-01 RX ADMIN — METOPROLOL SUCCINATE 50 MG: 50 TABLET, EXTENDED RELEASE ORAL at 06:54

## 2021-01-01 RX ADMIN — INSULIN HUMAN 2 UNITS: 100 INJECTION, SOLUTION PARENTERAL at 20:52

## 2021-01-01 RX ADMIN — INSULIN HUMAN 2 UNITS: 100 INJECTION, SOLUTION PARENTERAL at 22:31

## 2021-01-01 RX ADMIN — INSULIN HUMAN 5 UNITS: 100 INJECTION, SOLUTION PARENTERAL at 13:20

## 2021-01-01 RX ADMIN — FUROSEMIDE 40 MG: 10 INJECTION, SOLUTION INTRAMUSCULAR; INTRAVENOUS at 04:09

## 2021-01-01 RX ADMIN — Medication 1 G: at 09:50

## 2021-01-01 RX ADMIN — LISINOPRIL 2.5 MG: 2.5 TABLET ORAL at 04:44

## 2021-01-01 RX ADMIN — OXYCODONE 5 MG: 5 TABLET ORAL at 13:18

## 2021-01-01 RX ADMIN — SODIUM CHLORIDE 2.5 MILLION UNITS: 9 INJECTION, SOLUTION INTRAVENOUS at 10:45

## 2021-01-01 RX ADMIN — TAMSULOSIN HYDROCHLORIDE 0.4 MG: 0.4 CAPSULE ORAL at 05:45

## 2021-01-01 RX ADMIN — THERA TABS 1 TABLET: TAB at 04:48

## 2021-01-01 RX ADMIN — ACETAMINOPHEN 650 MG: 325 TABLET, FILM COATED ORAL at 17:22

## 2021-01-01 RX ADMIN — DEXAMETHASONE 4 MG: 4 TABLET ORAL at 00:53

## 2021-01-01 RX ADMIN — ZINC SULFATE 220 MG (50 MG) CAPSULE 220 MG: CAPSULE at 04:10

## 2021-01-01 RX ADMIN — PIPERACILLIN AND TAZOBACTAM 3.38 G: 3; .375 INJECTION, POWDER, LYOPHILIZED, FOR SOLUTION INTRAVENOUS; PARENTERAL at 02:12

## 2021-01-01 RX ADMIN — METOPROLOL TARTRATE 12.5 MG: 25 TABLET, FILM COATED ORAL at 17:21

## 2021-01-01 RX ADMIN — DEXAMETHASONE SODIUM PHOSPHATE 4 MG: 4 INJECTION, SOLUTION INTRA-ARTICULAR; INTRALESIONAL; INTRAMUSCULAR; INTRAVENOUS; SOFT TISSUE at 05:14

## 2021-01-01 RX ADMIN — SODIUM CHLORIDE 4 MILLION UNITS: 9 INJECTION, SOLUTION INTRAVENOUS at 09:00

## 2021-01-01 RX ADMIN — LEVETIRACETAM 500 MG: 100 SOLUTION ORAL at 18:08

## 2021-01-01 RX ADMIN — INSULIN HUMAN 2 UNITS: 100 INJECTION, SOLUTION PARENTERAL at 12:12

## 2021-01-01 RX ADMIN — INSULIN HUMAN 1 UNITS: 100 INJECTION, SOLUTION PARENTERAL at 11:47

## 2021-01-01 RX ADMIN — OXYCODONE HYDROCHLORIDE 10 MG: 10 TABLET ORAL at 16:49

## 2021-01-01 RX ADMIN — LEVETIRACETAM 1000 MG: 500 TABLET ORAL at 06:21

## 2021-01-01 RX ADMIN — DEXMEDETOMIDINE 1 MCG/KG/HR: 200 INJECTION, SOLUTION INTRAVENOUS at 06:14

## 2021-01-01 RX ADMIN — DEXAMETHASONE 2 MG: 4 TABLET ORAL at 14:33

## 2021-01-01 RX ADMIN — MAGNESIUM SULFATE 2 G: 2 INJECTION INTRAVENOUS at 12:02

## 2021-01-01 RX ADMIN — ALLOPURINOL 100 MG: 100 TABLET ORAL at 05:26

## 2021-01-01 RX ADMIN — ACETAMINOPHEN 325 MG: 325 TABLET, FILM COATED ORAL at 00:01

## 2021-01-01 RX ADMIN — Medication 2 G: at 09:07

## 2021-01-01 RX ADMIN — LEVETIRACETAM 500 MG: 500 TABLET ORAL at 05:28

## 2021-01-01 RX ADMIN — INSULIN HUMAN 3 UNITS: 100 INJECTION, SOLUTION PARENTERAL at 12:04

## 2021-01-01 RX ADMIN — OXYCODONE 5 MG: 5 TABLET ORAL at 21:58

## 2021-01-01 RX ADMIN — OXYCODONE 10 MG: 5 TABLET ORAL at 20:02

## 2021-01-01 RX ADMIN — INSULIN HUMAN 2 UNITS: 100 INJECTION, SOLUTION PARENTERAL at 21:07

## 2021-01-01 RX ADMIN — OXYCODONE HYDROCHLORIDE 10 MG: 10 TABLET ORAL at 05:51

## 2021-01-01 RX ADMIN — DOCUSATE SODIUM 50 MG AND SENNOSIDES 8.6 MG 2 TABLET: 8.6; 5 TABLET, FILM COATED ORAL at 17:17

## 2021-01-01 RX ADMIN — METOPROLOL TARTRATE 5 MG: 5 INJECTION INTRAVENOUS at 15:05

## 2021-01-01 RX ADMIN — CEFAZOLIN SODIUM 2 G: 2 INJECTION, SOLUTION INTRAVENOUS at 16:13

## 2021-01-01 RX ADMIN — TAMSULOSIN HYDROCHLORIDE 0.4 MG: 0.4 CAPSULE ORAL at 04:46

## 2021-01-01 RX ADMIN — CEFEPIME 2 G: 2 INJECTION, POWDER, FOR SOLUTION INTRAVENOUS at 04:48

## 2021-01-01 RX ADMIN — AMIODARONE HYDROCHLORIDE 400 MG: 200 TABLET ORAL at 06:16

## 2021-01-01 RX ADMIN — INSULIN HUMAN 2 UNITS: 100 INJECTION, SOLUTION PARENTERAL at 17:04

## 2021-01-01 RX ADMIN — Medication 2 G: at 16:55

## 2021-01-01 RX ADMIN — DIBASIC SODIUM PHOSPHATE, MONOBASIC POTASSIUM PHOSPHATE AND MONOBASIC SODIUM PHOSPHATE 250 MG: 852; 155; 130 TABLET ORAL at 12:36

## 2021-01-01 RX ADMIN — FLUDROCORTISONE ACETATE 0.1 MG: 0.1 TABLET ORAL at 06:14

## 2021-01-01 RX ADMIN — SUCCINYLCHOLINE CHLORIDE 150 MG: 20 INJECTION, SOLUTION INTRAMUSCULAR; INTRAVENOUS at 19:00

## 2021-01-01 RX ADMIN — FUROSEMIDE 40 MG: 10 INJECTION, SOLUTION INTRAMUSCULAR; INTRAVENOUS at 17:40

## 2021-01-01 RX ADMIN — DEXAMETHASONE 4 MG: 4 TABLET ORAL at 12:40

## 2021-01-01 RX ADMIN — SODIUM CHLORIDE 3000 MG: 9 INJECTION, SOLUTION INTRAVENOUS at 13:10

## 2021-01-01 RX ADMIN — LISINOPRIL 2.5 MG: 2.5 TABLET ORAL at 06:15

## 2021-01-01 RX ADMIN — INSULIN HUMAN 5 UNITS: 100 INJECTION, SOLUTION PARENTERAL at 21:07

## 2021-01-01 RX ADMIN — ACETAMINOPHEN 650 MG: 325 TABLET, FILM COATED ORAL at 23:07

## 2021-01-01 RX ADMIN — DOCUSATE SODIUM 50 MG AND SENNOSIDES 8.6 MG 2 TABLET: 8.6; 5 TABLET, FILM COATED ORAL at 17:20

## 2021-01-01 RX ADMIN — AMIODARONE HYDROCHLORIDE 400 MG: 200 TABLET ORAL at 05:04

## 2021-01-01 RX ADMIN — DEXAMETHASONE 2 MG: 4 TABLET ORAL at 12:50

## 2021-01-01 RX ADMIN — IOHEXOL 100 ML: 350 INJECTION, SOLUTION INTRAVENOUS at 13:01

## 2021-01-01 RX ADMIN — DEXAMETHASONE 4 MG: 4 TABLET ORAL at 16:31

## 2021-01-01 RX ADMIN — ONDANSETRON 4 MG: 2 INJECTION INTRAMUSCULAR; INTRAVENOUS at 05:45

## 2021-01-01 RX ADMIN — TAMSULOSIN HYDROCHLORIDE 0.4 MG: 0.4 CAPSULE ORAL at 06:19

## 2021-01-01 RX ADMIN — FLUDROCORTISONE ACETATE 0.1 MG: 0.1 TABLET ORAL at 04:46

## 2021-01-01 RX ADMIN — MIDAZOLAM HYDROCHLORIDE 1 MG: 1 INJECTION, SOLUTION INTRAMUSCULAR; INTRAVENOUS at 13:41

## 2021-01-01 RX ADMIN — SODIUM CHLORIDE: 9 INJECTION, SOLUTION INTRAVENOUS at 00:05

## 2021-01-01 RX ADMIN — MORPHINE SULFATE 2 MG: 4 INJECTION INTRAVENOUS at 21:01

## 2021-01-01 RX ADMIN — HYDROCORTISONE 10 MG: 10 TABLET ORAL at 16:39

## 2021-01-01 RX ADMIN — Medication 1 G: at 17:22

## 2021-01-01 RX ADMIN — ACETAMINOPHEN 650 MG: 325 TABLET, FILM COATED ORAL at 11:09

## 2021-01-01 RX ADMIN — INSULIN HUMAN 3 UNITS: 100 INJECTION, SOLUTION PARENTERAL at 08:52

## 2021-01-01 RX ADMIN — TAMSULOSIN HYDROCHLORIDE 0.4 MG: 0.4 CAPSULE ORAL at 05:28

## 2021-01-01 RX ADMIN — LISINOPRIL 2.5 MG: 2.5 TABLET ORAL at 05:20

## 2021-01-01 RX ADMIN — INSULIN HUMAN 4 UNITS: 100 INJECTION, SOLUTION PARENTERAL at 08:00

## 2021-01-01 RX ADMIN — LISINOPRIL 2.5 MG: 2.5 TABLET ORAL at 05:44

## 2021-01-01 RX ADMIN — OXYCODONE 5 MG: 5 TABLET ORAL at 14:15

## 2021-01-01 RX ADMIN — SODIUM CHLORIDE: 9 INJECTION, SOLUTION INTRAVENOUS at 15:45

## 2021-01-01 RX ADMIN — ACETAMINOPHEN 650 MG: 325 TABLET, FILM COATED ORAL at 11:13

## 2021-01-01 RX ADMIN — OXYCODONE 10 MG: 5 TABLET ORAL at 05:16

## 2021-01-01 RX ADMIN — Medication 1 G: at 09:25

## 2021-01-01 RX ADMIN — IOHEXOL 80 ML: 350 INJECTION, SOLUTION INTRAVENOUS at 12:55

## 2021-01-01 RX ADMIN — OXYCODONE HYDROCHLORIDE 10 MG: 10 TABLET ORAL at 00:13

## 2021-01-01 RX ADMIN — Medication 2 G: at 16:31

## 2021-01-01 RX ADMIN — PIPERACILLIN AND TAZOBACTAM 3.38 G: 3; .375 INJECTION, POWDER, LYOPHILIZED, FOR SOLUTION INTRAVENOUS; PARENTERAL at 23:03

## 2021-01-01 RX ADMIN — OXYCODONE HYDROCHLORIDE 10 MG: 10 TABLET ORAL at 23:04

## 2021-01-01 RX ADMIN — MORPHINE SULFATE 2 MG: 4 INJECTION INTRAVENOUS at 17:42

## 2021-01-01 RX ADMIN — CHOLECALCIFEROL (VITAMIN D3) 10 MCG (400 UNIT) TABLET 800 UNITS: at 06:05

## 2021-01-01 RX ADMIN — DOCUSATE SODIUM 50 MG AND SENNOSIDES 8.6 MG 2 TABLET: 8.6; 5 TABLET, FILM COATED ORAL at 16:27

## 2021-01-01 RX ADMIN — METRONIDAZOLE 500 MG: 500 TABLET ORAL at 14:01

## 2021-01-01 RX ADMIN — INSULIN HUMAN 5 UNITS: 100 INJECTION, SOLUTION PARENTERAL at 12:32

## 2021-01-01 RX ADMIN — FENTANYL CITRATE 25 MCG: 50 INJECTION, SOLUTION INTRAMUSCULAR; INTRAVENOUS at 13:46

## 2021-01-01 RX ADMIN — LISINOPRIL 2.5 MG: 2.5 TABLET ORAL at 10:09

## 2021-01-01 RX ADMIN — OXYCODONE 5 MG: 5 TABLET ORAL at 16:54

## 2021-01-01 RX ADMIN — FLUDROCORTISONE ACETATE 0.1 MG: 0.1 TABLET ORAL at 06:07

## 2021-01-01 RX ADMIN — POLYETHYLENE GLYCOL 3350 1 PACKET: 17 POWDER, FOR SOLUTION ORAL at 18:01

## 2021-01-01 RX ADMIN — DEXAMETHASONE SODIUM PHOSPHATE 4 MG: 4 INJECTION, SOLUTION INTRA-ARTICULAR; INTRALESIONAL; INTRAMUSCULAR; INTRAVENOUS; SOFT TISSUE at 14:52

## 2021-01-01 RX ADMIN — FUROSEMIDE 40 MG: 10 INJECTION, SOLUTION INTRAMUSCULAR; INTRAVENOUS at 05:15

## 2021-01-01 RX ADMIN — Medication 2 G: at 06:36

## 2021-01-01 RX ADMIN — METOPROLOL TARTRATE 12.5 MG: 25 TABLET, FILM COATED ORAL at 16:36

## 2021-01-01 RX ADMIN — INSULIN HUMAN 3 UNITS: 100 INJECTION, SOLUTION PARENTERAL at 12:48

## 2021-01-01 RX ADMIN — AMIODARONE HYDROCHLORIDE 400 MG: 200 TABLET ORAL at 17:38

## 2021-01-01 RX ADMIN — SODIUM CHLORIDE 3 G: 900 INJECTION INTRAVENOUS at 11:54

## 2021-01-01 RX ADMIN — INSULIN HUMAN 2 UNITS: 100 INJECTION, SOLUTION PARENTERAL at 20:44

## 2021-01-01 RX ADMIN — DEXAMETHASONE 4 MG: 4 TABLET ORAL at 05:46

## 2021-01-01 RX ADMIN — LACOSAMIDE 100 MG: 100 TABLET, FILM COATED ORAL at 06:00

## 2021-01-01 RX ADMIN — INSULIN HUMAN 3 UNITS: 100 INJECTION, SOLUTION PARENTERAL at 20:28

## 2021-01-01 RX ADMIN — METOPROLOL SUCCINATE 50 MG: 50 TABLET, EXTENDED RELEASE ORAL at 08:15

## 2021-01-01 RX ADMIN — OXYCODONE HYDROCHLORIDE 10 MG: 10 TABLET ORAL at 19:50

## 2021-01-01 RX ADMIN — Medication 2 G: at 08:17

## 2021-01-01 RX ADMIN — LACOSAMIDE 100 MG: 100 TABLET, FILM COATED ORAL at 16:55

## 2021-01-01 RX ADMIN — POTASSIUM CHLORIDE 40 MEQ: 1500 TABLET, EXTENDED RELEASE ORAL at 06:54

## 2021-01-01 RX ADMIN — DOCUSATE SODIUM 50 MG AND SENNOSIDES 8.6 MG 2 TABLET: 8.6; 5 TABLET, FILM COATED ORAL at 05:13

## 2021-01-01 RX ADMIN — DOCUSATE SODIUM 50 MG AND SENNOSIDES 8.6 MG 2 TABLET: 8.6; 5 TABLET, FILM COATED ORAL at 17:29

## 2021-01-01 RX ADMIN — METRONIDAZOLE 500 MG: 500 TABLET ORAL at 15:00

## 2021-01-01 RX ADMIN — Medication 1 G: at 09:45

## 2021-01-01 RX ADMIN — ACETAMINOPHEN 650 MG: 325 TABLET, FILM COATED ORAL at 12:30

## 2021-01-01 RX ADMIN — CEFTRIAXONE SODIUM 2 G: 2 INJECTION, POWDER, FOR SOLUTION INTRAMUSCULAR; INTRAVENOUS at 05:15

## 2021-01-01 RX ADMIN — AMIODARONE HYDROCHLORIDE 150 MG: 1.5 INJECTION, SOLUTION INTRAVENOUS at 14:47

## 2021-01-01 RX ADMIN — INSULIN GLARGINE 15 UNITS: 100 INJECTION, SOLUTION SUBCUTANEOUS at 09:53

## 2021-01-01 RX ADMIN — LISINOPRIL 2.5 MG: 2.5 TABLET ORAL at 04:46

## 2021-01-01 RX ADMIN — LABETALOL HYDROCHLORIDE 10 MG: 5 INJECTION, SOLUTION INTRAVENOUS at 00:40

## 2021-01-01 RX ADMIN — SODIUM CHLORIDE 3 G: 900 INJECTION INTRAVENOUS at 17:06

## 2021-01-01 RX ADMIN — Medication 2 G: at 17:04

## 2021-01-01 RX ADMIN — MAGNESIUM SULFATE 1 G: 1 INJECTION INTRAVENOUS at 21:01

## 2021-01-01 RX ADMIN — DEXAMETHASONE 4 MG: 4 TABLET ORAL at 00:24

## 2021-01-01 RX ADMIN — OXYCODONE 5 MG: 5 TABLET ORAL at 12:25

## 2021-01-01 RX ADMIN — METOPROLOL TARTRATE 25 MG: 25 TABLET, FILM COATED ORAL at 05:16

## 2021-01-01 RX ADMIN — METRONIDAZOLE 500 MG: 500 TABLET ORAL at 20:54

## 2021-01-01 RX ADMIN — FUROSEMIDE 40 MG: 10 INJECTION, SOLUTION INTRAMUSCULAR; INTRAVENOUS at 17:38

## 2021-01-01 RX ADMIN — TAMSULOSIN HYDROCHLORIDE 0.4 MG: 0.4 CAPSULE ORAL at 04:56

## 2021-01-01 RX ADMIN — INSULIN HUMAN 3 UNITS: 100 INJECTION, SOLUTION PARENTERAL at 12:39

## 2021-01-01 RX ADMIN — AMIODARONE HYDROCHLORIDE 400 MG: 200 TABLET ORAL at 17:41

## 2021-01-01 RX ADMIN — DILTIAZEM HYDROCHLORIDE 25 MG: 5 INJECTION INTRAVENOUS at 16:44

## 2021-01-01 RX ADMIN — POTASSIUM PHOSPHATE, MONOBASIC AND POTASSIUM PHOSPHATE, DIBASIC 15 MMOL: 224; 236 INJECTION, SOLUTION, CONCENTRATE INTRAVENOUS at 08:45

## 2021-01-01 RX ADMIN — COLCHICINE 0.6 MG: 0.6 TABLET, FILM COATED ORAL at 17:36

## 2021-01-01 RX ADMIN — LACOSAMIDE 100 MG: 100 TABLET, FILM COATED ORAL at 04:47

## 2021-01-01 RX ADMIN — WARFARIN SODIUM 2 MG: 2 TABLET ORAL at 17:38

## 2021-01-01 RX ADMIN — FLUDROCORTISONE ACETATE 0.1 MG: 0.1 TABLET ORAL at 05:40

## 2021-01-01 RX ADMIN — COLCHICINE 0.6 MG: 0.6 TABLET, FILM COATED ORAL at 07:48

## 2021-01-01 RX ADMIN — ACETAMINOPHEN 650 MG: 325 TABLET, FILM COATED ORAL at 12:25

## 2021-01-01 RX ADMIN — ACETAMINOPHEN 650 MG: 325 TABLET, FILM COATED ORAL at 00:04

## 2021-01-01 RX ADMIN — LACOSAMIDE 100 MG: 100 TABLET, FILM COATED ORAL at 05:09

## 2021-01-01 RX ADMIN — ONDANSETRON 4 MG: 2 INJECTION INTRAMUSCULAR; INTRAVENOUS at 05:15

## 2021-01-01 RX ADMIN — INSULIN HUMAN 2 UNITS: 100 INJECTION, SOLUTION PARENTERAL at 10:44

## 2021-01-01 RX ADMIN — MORPHINE SULFATE 2 MG: 4 INJECTION INTRAVENOUS at 21:09

## 2021-01-01 RX ADMIN — INSULIN HUMAN 2 UNITS: 100 INJECTION, SOLUTION PARENTERAL at 12:09

## 2021-01-01 RX ADMIN — ONDANSETRON 4 MG: 4 TABLET, ORALLY DISINTEGRATING ORAL at 12:09

## 2021-01-01 RX ADMIN — ACETAMINOPHEN 650 MG: 325 TABLET, FILM COATED ORAL at 12:32

## 2021-01-01 RX ADMIN — INSULIN HUMAN 3 UNITS: 100 INJECTION, SOLUTION PARENTERAL at 12:18

## 2021-01-01 RX ADMIN — Medication 2 G: at 11:13

## 2021-01-01 RX ADMIN — FAMOTIDINE 20 MG: 20 TABLET ORAL at 05:00

## 2021-01-01 RX ADMIN — MORPHINE SULFATE 2 MG: 4 INJECTION INTRAVENOUS at 04:30

## 2021-01-01 RX ADMIN — TAMSULOSIN HYDROCHLORIDE 0.4 MG: 0.4 CAPSULE ORAL at 05:46

## 2021-01-01 RX ADMIN — ENOXAPARIN SODIUM 120 MG: 120 INJECTION SUBCUTANEOUS at 04:04

## 2021-01-01 RX ADMIN — DEXAMETHASONE 3 MG: 6 TABLET ORAL at 22:43

## 2021-01-01 RX ADMIN — INSULIN HUMAN 3 UNITS: 100 INJECTION, SOLUTION PARENTERAL at 17:00

## 2021-01-01 RX ADMIN — ACETAMINOPHEN 650 MG: 325 TABLET, FILM COATED ORAL at 12:57

## 2021-01-01 RX ADMIN — OXYCODONE 10 MG: 5 TABLET ORAL at 04:05

## 2021-01-01 RX ADMIN — MORPHINE SULFATE 2 MG: 4 INJECTION INTRAVENOUS at 10:22

## 2021-01-01 RX ADMIN — FLUDROCORTISONE ACETATE 0.1 MG: 0.1 TABLET ORAL at 06:18

## 2021-01-01 RX ADMIN — DOCUSATE SODIUM 50 MG AND SENNOSIDES 8.6 MG 2 TABLET: 8.6; 5 TABLET, FILM COATED ORAL at 05:19

## 2021-01-01 RX ADMIN — INSULIN HUMAN 2 UNITS: 100 INJECTION, SOLUTION PARENTERAL at 11:18

## 2021-01-01 RX ADMIN — ONDANSETRON 4 MG: 2 INJECTION INTRAMUSCULAR; INTRAVENOUS at 21:21

## 2021-01-01 RX ADMIN — OXYCODONE HYDROCHLORIDE 10 MG: 10 TABLET ORAL at 00:47

## 2021-01-01 RX ADMIN — OXYCODONE 10 MG: 5 TABLET ORAL at 11:11

## 2021-01-01 RX ADMIN — ALLOPURINOL 100 MG: 100 TABLET ORAL at 05:44

## 2021-01-01 RX ADMIN — ONDANSETRON 4 MG: 2 INJECTION INTRAMUSCULAR; INTRAVENOUS at 08:31

## 2021-01-01 RX ADMIN — HEPARIN SODIUM 20 UNITS/KG/HR: 5000 INJECTION, SOLUTION INTRAVENOUS at 09:29

## 2021-01-01 RX ADMIN — SODIUM CHLORIDE 500 ML: 9 INJECTION, SOLUTION INTRAVENOUS at 11:27

## 2021-01-01 RX ADMIN — AMIODARONE HYDROCHLORIDE 400 MG: 200 TABLET ORAL at 04:43

## 2021-01-01 RX ADMIN — SODIUM CHLORIDE 3 G: 900 INJECTION INTRAVENOUS at 17:22

## 2021-01-01 RX ADMIN — INSULIN HUMAN 1 UNITS: 100 INJECTION, SOLUTION PARENTERAL at 11:33

## 2021-01-01 RX ADMIN — AMIODARONE HYDROCHLORIDE 400 MG: 200 TABLET ORAL at 17:30

## 2021-01-01 RX ADMIN — LACOSAMIDE 100 MG: 100 TABLET, FILM COATED ORAL at 16:39

## 2021-01-01 RX ADMIN — OXYCODONE HYDROCHLORIDE 10 MG: 10 TABLET ORAL at 17:41

## 2021-01-01 RX ADMIN — METOPROLOL TARTRATE 25 MG: 25 TABLET, FILM COATED ORAL at 05:21

## 2021-01-01 RX ADMIN — HYDRALAZINE HYDROCHLORIDE 10 MG: 20 INJECTION INTRAMUSCULAR; INTRAVENOUS at 17:05

## 2021-01-01 RX ADMIN — ACETAMINOPHEN 650 MG: 325 TABLET, FILM COATED ORAL at 23:18

## 2021-01-01 RX ADMIN — SODIUM CHLORIDE 4 MILLION UNITS: 9 INJECTION, SOLUTION INTRAVENOUS at 01:28

## 2021-01-01 RX ADMIN — CEFTRIAXONE SODIUM 2 G: 2 INJECTION, POWDER, FOR SOLUTION INTRAMUSCULAR; INTRAVENOUS at 05:45

## 2021-01-01 RX ADMIN — MAGNESIUM GLUCONATE 500 MG ORAL TABLET 400 MG: 500 TABLET ORAL at 05:09

## 2021-01-01 RX ADMIN — ACETAMINOPHEN 650 MG: 325 TABLET, FILM COATED ORAL at 16:32

## 2021-01-01 RX ADMIN — DOCUSATE SODIUM 50 MG AND SENNOSIDES 8.6 MG 2 TABLET: 8.6; 5 TABLET, FILM COATED ORAL at 17:59

## 2021-01-01 RX ADMIN — OXYCODONE 10 MG: 5 TABLET ORAL at 17:44

## 2021-01-01 RX ADMIN — ACETAMINOPHEN 650 MG: 325 TABLET, FILM COATED ORAL at 11:25

## 2021-01-01 RX ADMIN — THERA TABS 1 TABLET: TAB at 05:52

## 2021-01-01 RX ADMIN — DEXAMETHASONE 3 MG: 6 TABLET ORAL at 22:14

## 2021-01-01 RX ADMIN — INSULIN HUMAN 2 UNITS: 100 INJECTION, SOLUTION PARENTERAL at 12:20

## 2021-01-01 RX ADMIN — AMIODARONE HYDROCHLORIDE 150 MG: 1.5 INJECTION, SOLUTION INTRAVENOUS at 00:11

## 2021-01-01 RX ADMIN — ONDANSETRON 4 MG: 2 INJECTION INTRAMUSCULAR; INTRAVENOUS at 08:43

## 2021-01-01 RX ADMIN — LACOSAMIDE 100 MG: 100 TABLET, FILM COATED ORAL at 17:04

## 2021-01-01 RX ADMIN — DOCUSATE SODIUM 50 MG AND SENNOSIDES 8.6 MG 2 TABLET: 8.6; 5 TABLET, FILM COATED ORAL at 04:44

## 2021-01-01 RX ADMIN — METRONIDAZOLE 500 MG: 500 TABLET ORAL at 04:42

## 2021-01-01 RX ADMIN — IPRATROPIUM BROMIDE AND ALBUTEROL SULFATE 3 ML: .5; 3 SOLUTION RESPIRATORY (INHALATION) at 05:06

## 2021-01-01 RX ADMIN — WARFARIN SODIUM 2.5 MG: 2.5 TABLET ORAL at 17:41

## 2021-01-01 RX ADMIN — DOCUSATE SODIUM 50 MG AND SENNOSIDES 8.6 MG 2 TABLET: 8.6; 5 TABLET, FILM COATED ORAL at 06:07

## 2021-01-01 RX ADMIN — LISINOPRIL 2.5 MG: 2.5 TABLET ORAL at 05:52

## 2021-01-01 RX ADMIN — NOREPINEPHRINE BITARTRATE 2 MCG/MIN: 1 INJECTION INTRAVENOUS at 01:41

## 2021-01-01 RX ADMIN — THERA TABS 1 TABLET: TAB at 05:47

## 2021-01-01 RX ADMIN — OXYCODONE HYDROCHLORIDE 10 MG: 10 TABLET ORAL at 06:22

## 2021-01-01 RX ADMIN — INSULIN HUMAN 2 UNITS: 100 INJECTION, SOLUTION PARENTERAL at 09:47

## 2021-01-01 RX ADMIN — ACETAMINOPHEN 650 MG: 325 TABLET, FILM COATED ORAL at 05:42

## 2021-01-01 RX ADMIN — SODIUM CHLORIDE 4 MILLION UNITS: 9 INJECTION, SOLUTION INTRAVENOUS at 05:17

## 2021-01-01 RX ADMIN — ALLOPURINOL 100 MG: 100 TABLET ORAL at 04:43

## 2021-01-01 RX ADMIN — MORPHINE SULFATE 2 MG: 4 INJECTION INTRAVENOUS at 19:19

## 2021-01-01 RX ADMIN — METOPROLOL SUCCINATE 25 MG: 25 TABLET, EXTENDED RELEASE ORAL at 05:19

## 2021-01-01 RX ADMIN — ACETAMINOPHEN 650 MG: 325 TABLET, FILM COATED ORAL at 17:24

## 2021-01-01 RX ADMIN — AMIODARONE HYDROCHLORIDE 1 MG/MIN: 1.8 INJECTION, SOLUTION INTRAVENOUS at 20:26

## 2021-01-01 RX ADMIN — ACETAMINOPHEN 650 MG: 325 TABLET, FILM COATED ORAL at 18:06

## 2021-01-01 RX ADMIN — SODIUM CHLORIDE 3 G: 900 INJECTION INTRAVENOUS at 11:35

## 2021-01-01 RX ADMIN — METOPROLOL TARTRATE 25 MG: 25 TABLET, FILM COATED ORAL at 17:15

## 2021-01-01 RX ADMIN — ACETAMINOPHEN 650 MG: 325 TABLET, FILM COATED ORAL at 05:51

## 2021-01-01 RX ADMIN — LACOSAMIDE 100 MG: 100 TABLET, FILM COATED ORAL at 17:32

## 2021-01-01 RX ADMIN — SODIUM CHLORIDE 2.5 MILLION UNITS: 9 INJECTION, SOLUTION INTRAVENOUS at 21:03

## 2021-01-01 RX ADMIN — LEVETIRACETAM 500 MG: 500 TABLET ORAL at 17:16

## 2021-01-01 RX ADMIN — DEXAMETHASONE 4 MG: 4 TABLET ORAL at 05:21

## 2021-01-01 RX ADMIN — SODIUM CHLORIDE: 9 INJECTION, SOLUTION INTRAVENOUS at 20:40

## 2021-01-01 RX ADMIN — PROPOFOL 140 MG: 10 INJECTION, EMULSION INTRAVENOUS at 07:08

## 2021-01-01 RX ADMIN — MORPHINE SULFATE 2 MG: 4 INJECTION INTRAVENOUS at 22:06

## 2021-01-01 RX ADMIN — DEXTROSE MONOHYDRATE: 50 INJECTION, SOLUTION INTRAVENOUS at 23:45

## 2021-01-01 RX ADMIN — OXYCODONE HYDROCHLORIDE 10 MG: 10 TABLET ORAL at 17:35

## 2021-01-01 RX ADMIN — TAMSULOSIN HYDROCHLORIDE 0.4 MG: 0.4 CAPSULE ORAL at 18:40

## 2021-01-01 RX ADMIN — ACETAMINOPHEN 650 MG: 325 TABLET, FILM COATED ORAL at 12:48

## 2021-01-01 RX ADMIN — Medication 1 G: at 18:06

## 2021-01-01 RX ADMIN — CHOLECALCIFEROL (VITAMIN D3) 10 MCG (400 UNIT) TABLET 800 UNITS: at 04:47

## 2021-01-01 RX ADMIN — OXYCODONE 5 MG: 5 TABLET ORAL at 19:28

## 2021-01-01 RX ADMIN — SODIUM CHLORIDE 3 G: 900 INJECTION INTRAVENOUS at 00:09

## 2021-01-01 RX ADMIN — TAMSULOSIN HYDROCHLORIDE 0.4 MG: 0.4 CAPSULE ORAL at 05:40

## 2021-01-01 RX ADMIN — INSULIN HUMAN 2 UNITS: 100 INJECTION, SOLUTION PARENTERAL at 20:48

## 2021-01-01 RX ADMIN — METOPROLOL TARTRATE 5 MG: 5 INJECTION INTRAVENOUS at 14:18

## 2021-01-01 RX ADMIN — DOCUSATE SODIUM 50 MG AND SENNOSIDES 8.6 MG 2 TABLET: 8.6; 5 TABLET, FILM COATED ORAL at 17:38

## 2021-01-01 RX ADMIN — CEFEPIME 2 G: 2 INJECTION, POWDER, FOR SOLUTION INTRAVENOUS at 20:21

## 2021-01-01 RX ADMIN — ACETAMINOPHEN 650 MG: 325 TABLET, FILM COATED ORAL at 11:44

## 2021-01-01 RX ADMIN — DOCUSATE SODIUM 50 MG AND SENNOSIDES 8.6 MG 2 TABLET: 8.6; 5 TABLET, FILM COATED ORAL at 05:16

## 2021-01-01 RX ADMIN — MIDAZOLAM HYDROCHLORIDE 1 MG: 1 INJECTION, SOLUTION INTRAMUSCULAR; INTRAVENOUS at 13:46

## 2021-01-01 RX ADMIN — OXYCODONE 5 MG: 5 TABLET ORAL at 08:43

## 2021-01-01 RX ADMIN — CHOLECALCIFEROL (VITAMIN D3) 10 MCG (400 UNIT) TABLET 800 UNITS: at 05:19

## 2021-01-01 RX ADMIN — BENZOCAINE AND MENTHOL 1 LOZENGE: 15; 3.6 LOZENGE ORAL at 23:46

## 2021-01-01 RX ADMIN — MAGNESIUM HYDROXIDE 30 ML: 400 SUSPENSION ORAL at 12:32

## 2021-01-01 RX ADMIN — DEXAMETHASONE 3 MG: 6 TABLET ORAL at 15:04

## 2021-01-01 RX ADMIN — ACETAMINOPHEN 650 MG: 325 TABLET, FILM COATED ORAL at 17:17

## 2021-01-01 RX ADMIN — DEXMEDETOMIDINE HYDROCHLORIDE 0.5 MCG/KG/HR: 100 INJECTION, SOLUTION INTRAVENOUS at 00:24

## 2021-01-01 RX ADMIN — ACETAMINOPHEN 650 MG: 325 TABLET, FILM COATED ORAL at 05:28

## 2021-01-01 RX ADMIN — ACETAMINOPHEN 650 MG: 325 TABLET, FILM COATED ORAL at 04:08

## 2021-01-01 RX ADMIN — INSULIN HUMAN 3 UNITS: 100 INJECTION, SOLUTION PARENTERAL at 21:29

## 2021-01-01 RX ADMIN — OXYCODONE 5 MG: 5 TABLET ORAL at 22:56

## 2021-01-01 RX ADMIN — MORPHINE SULFATE 4 MG: 4 INJECTION INTRAVENOUS at 16:48

## 2021-01-01 RX ADMIN — LEVETIRACETAM INJECTION 500 MG: 5 INJECTION INTRAVENOUS at 05:41

## 2021-01-01 RX ADMIN — Medication 2 G: at 11:44

## 2021-01-01 RX ADMIN — INSULIN HUMAN 1 UNITS: 100 INJECTION, SOLUTION PARENTERAL at 00:56

## 2021-01-01 RX ADMIN — FUROSEMIDE 40 MG: 40 TABLET ORAL at 14:51

## 2021-01-01 RX ADMIN — LEVETIRACETAM 1000 MG: 500 TABLET ORAL at 18:01

## 2021-01-01 RX ADMIN — OXYCODONE HYDROCHLORIDE 10 MG: 10 TABLET ORAL at 07:53

## 2021-01-01 RX ADMIN — AMIODARONE HYDROCHLORIDE 400 MG: 200 TABLET ORAL at 04:08

## 2021-01-01 RX ADMIN — OXYCODONE 10 MG: 5 TABLET ORAL at 15:35

## 2021-01-01 RX ADMIN — DEXAMETHASONE 3 MG: 6 TABLET ORAL at 05:52

## 2021-01-01 RX ADMIN — INSULIN HUMAN 2 UNITS: 100 INJECTION, SOLUTION PARENTERAL at 20:31

## 2021-01-01 RX ADMIN — OXYCODONE 5 MG: 5 TABLET ORAL at 13:25

## 2021-01-01 RX ADMIN — OXYCODONE HYDROCHLORIDE 10 MG: 10 TABLET ORAL at 13:20

## 2021-01-01 RX ADMIN — METRONIDAZOLE 500 MG: 500 TABLET ORAL at 05:42

## 2021-01-01 RX ADMIN — METOPROLOL TARTRATE 25 MG: 25 TABLET, FILM COATED ORAL at 17:33

## 2021-01-01 RX ADMIN — AZITHROMYCIN MONOHYDRATE 500 MG: 250 TABLET ORAL at 06:00

## 2021-01-01 RX ADMIN — DEXAMETHASONE 3 MG: 6 TABLET ORAL at 21:50

## 2021-01-01 RX ADMIN — DEXAMETHASONE SODIUM PHOSPHATE 4 MG: 4 INJECTION, SOLUTION INTRA-ARTICULAR; INTRALESIONAL; INTRAMUSCULAR; INTRAVENOUS; SOFT TISSUE at 12:38

## 2021-01-01 RX ADMIN — DEXAMETHASONE 4 MG: 4 TABLET ORAL at 11:52

## 2021-01-01 RX ADMIN — LACOSAMIDE 100 MG: 100 TABLET, FILM COATED ORAL at 04:46

## 2021-01-01 RX ADMIN — ALLOPURINOL 100 MG: 100 TABLET ORAL at 05:46

## 2021-01-01 RX ADMIN — DEXAMETHASONE 2 MG: 4 TABLET ORAL at 05:44

## 2021-01-01 RX ADMIN — HYDROCORTISONE 20 MG: 10 TABLET ORAL at 05:51

## 2021-01-01 RX ADMIN — LEVETIRACETAM 500 MG: 500 TABLET ORAL at 05:45

## 2021-01-01 RX ADMIN — DEXAMETHASONE 2 MG: 4 TABLET ORAL at 04:57

## 2021-01-01 RX ADMIN — OXYCODONE HYDROCHLORIDE 10 MG: 10 TABLET ORAL at 01:34

## 2021-01-01 RX ADMIN — DEXAMETHASONE 4 MG: 4 TABLET ORAL at 20:26

## 2021-01-01 RX ADMIN — THERA TABS 1 TABLET: TAB at 21:35

## 2021-01-01 RX ADMIN — INSULIN HUMAN 2 UNITS: 100 INJECTION, SOLUTION PARENTERAL at 06:07

## 2021-01-01 RX ADMIN — METOPROLOL TARTRATE 12.5 MG: 25 TABLET, FILM COATED ORAL at 17:16

## 2021-01-01 RX ADMIN — INSULIN HUMAN 3 UNITS: 100 INJECTION, SOLUTION PARENTERAL at 12:22

## 2021-01-01 RX ADMIN — SODIUM CHLORIDE 3 G: 900 INJECTION INTRAVENOUS at 00:00

## 2021-01-01 RX ADMIN — DEXAMETHASONE 4 MG: 4 TABLET ORAL at 00:35

## 2021-01-01 RX ADMIN — DOCUSATE SODIUM 50 MG AND SENNOSIDES 8.6 MG 2 TABLET: 8.6; 5 TABLET, FILM COATED ORAL at 17:32

## 2021-01-01 RX ADMIN — OXYCODONE 5 MG: 5 TABLET ORAL at 05:33

## 2021-01-01 RX ADMIN — MAGNESIUM HYDROXIDE 30 ML: 400 SUSPENSION ORAL at 17:40

## 2021-01-01 RX ADMIN — INSULIN HUMAN 2 UNITS: 100 INJECTION, SOLUTION PARENTERAL at 18:07

## 2021-01-01 RX ADMIN — HYDROCORTISONE 20 MG: 10 TABLET ORAL at 05:20

## 2021-01-01 RX ADMIN — METOPROLOL TARTRATE 12.5 MG: 25 TABLET, FILM COATED ORAL at 04:46

## 2021-01-01 RX ADMIN — LACOSAMIDE 100 MG: 100 TABLET, FILM COATED ORAL at 05:25

## 2021-01-01 RX ADMIN — MAGNESIUM GLUCONATE 500 MG ORAL TABLET 400 MG: 500 TABLET ORAL at 05:52

## 2021-01-01 RX ADMIN — CEFTRIAXONE SODIUM 2 G: 2 INJECTION, POWDER, FOR SOLUTION INTRAMUSCULAR; INTRAVENOUS at 06:54

## 2021-01-01 RX ADMIN — MORPHINE SULFATE 2 MG: 4 INJECTION INTRAVENOUS at 21:29

## 2021-01-01 RX ADMIN — AMIODARONE HYDROCHLORIDE 400 MG: 200 TABLET ORAL at 07:00

## 2021-01-01 RX ADMIN — DOCUSATE SODIUM 50 MG AND SENNOSIDES 8.6 MG 2 TABLET: 8.6; 5 TABLET, FILM COATED ORAL at 16:55

## 2021-01-01 RX ADMIN — ACETAMINOPHEN 650 MG: 325 TABLET, FILM COATED ORAL at 05:18

## 2021-01-01 RX ADMIN — METRONIDAZOLE 500 MG: 500 TABLET ORAL at 05:04

## 2021-01-01 RX ADMIN — INSULIN HUMAN 2 UNITS: 100 INJECTION, SOLUTION PARENTERAL at 00:15

## 2021-01-01 RX ADMIN — ACETAMINOPHEN 650 MG: 325 TABLET, FILM COATED ORAL at 06:52

## 2021-01-01 RX ADMIN — METOPROLOL TARTRATE 12.5 MG: 25 TABLET, FILM COATED ORAL at 16:27

## 2021-01-01 RX ADMIN — SODIUM CHLORIDE: 9 INJECTION, SOLUTION INTRAVENOUS at 13:21

## 2021-01-01 RX ADMIN — MORPHINE SULFATE 2 MG: 4 INJECTION INTRAVENOUS at 22:50

## 2021-01-01 RX ADMIN — MORPHINE SULFATE 4 MG: 4 INJECTION INTRAVENOUS at 20:04

## 2021-01-01 RX ADMIN — ZINC SULFATE 220 MG (50 MG) CAPSULE 220 MG: CAPSULE at 05:42

## 2021-01-01 RX ADMIN — METOPROLOL TARTRATE 12.5 MG: 25 TABLET, FILM COATED ORAL at 17:48

## 2021-01-01 RX ADMIN — METOPROLOL SUCCINATE 25 MG: 25 TABLET, EXTENDED RELEASE ORAL at 06:05

## 2021-01-01 RX ADMIN — MORPHINE SULFATE 2 MG: 4 INJECTION INTRAVENOUS at 20:57

## 2021-01-01 RX ADMIN — LIDOCAINE HYDROCHLORIDE 1 APPLICATION: 20 JELLY TOPICAL at 15:15

## 2021-01-01 RX ADMIN — OXYCODONE 5 MG: 5 TABLET ORAL at 14:32

## 2021-01-01 RX ADMIN — TAMSULOSIN HYDROCHLORIDE 0.4 MG: 0.4 CAPSULE ORAL at 05:19

## 2021-01-01 RX ADMIN — AMIODARONE HYDROCHLORIDE 400 MG: 200 TABLET ORAL at 05:42

## 2021-01-01 RX ADMIN — AMIODARONE HYDROCHLORIDE 150 MG: 1.5 INJECTION, SOLUTION INTRAVENOUS at 12:55

## 2021-01-01 RX ADMIN — IMMUNE GLOBULIN INFUSION (HUMAN) 75 G: 100 INJECTION, SOLUTION INTRAVENOUS; SUBCUTANEOUS at 14:29

## 2021-01-01 RX ADMIN — AMIODARONE HYDROCHLORIDE 400 MG: 200 TABLET ORAL at 23:03

## 2021-01-01 RX ADMIN — HEPARIN SODIUM 18 UNITS/KG/HR: 5000 INJECTION, SOLUTION INTRAVENOUS at 20:38

## 2021-01-01 RX ADMIN — COLCHICINE 0.6 MG: 0.6 TABLET, FILM COATED ORAL at 17:05

## 2021-01-01 RX ADMIN — OXYCODONE HYDROCHLORIDE 10 MG: 10 TABLET ORAL at 08:23

## 2021-01-01 RX ADMIN — OXYCODONE HYDROCHLORIDE 10 MG: 10 TABLET ORAL at 23:25

## 2021-01-01 RX ADMIN — LACOSAMIDE 100 MG: 100 TABLET, FILM COATED ORAL at 16:59

## 2021-01-01 RX ADMIN — ZINC SULFATE 220 MG (50 MG) CAPSULE 220 MG: CAPSULE at 04:08

## 2021-01-01 RX ADMIN — OXYCODONE 10 MG: 5 TABLET ORAL at 06:17

## 2021-01-01 RX ADMIN — DEXAMETHASONE 4 MG: 4 TABLET ORAL at 05:34

## 2021-01-01 RX ADMIN — ACETAMINOPHEN 650 MG: 325 TABLET, FILM COATED ORAL at 05:26

## 2021-01-01 RX ADMIN — DEXAMETHASONE 2 MG: 4 TABLET ORAL at 18:01

## 2021-01-01 RX ADMIN — SODIUM CHLORIDE 1000 ML: 9 INJECTION, SOLUTION INTRAVENOUS at 15:45

## 2021-01-01 RX ADMIN — OXYCODONE HYDROCHLORIDE 10 MG: 10 TABLET ORAL at 01:19

## 2021-01-01 RX ADMIN — INSULIN HUMAN 2 UNITS: 100 INJECTION, SOLUTION PARENTERAL at 12:05

## 2021-01-01 RX ADMIN — LISINOPRIL 2.5 MG: 2.5 TABLET ORAL at 06:07

## 2021-01-01 RX ADMIN — METOPROLOL TARTRATE 12.5 MG: 25 TABLET, FILM COATED ORAL at 04:58

## 2021-01-01 RX ADMIN — ACETAMINOPHEN 650 MG: 325 TABLET, FILM COATED ORAL at 16:27

## 2021-01-01 RX ADMIN — INSULIN GLARGINE 5 UNITS: 100 INJECTION, SOLUTION SUBCUTANEOUS at 05:23

## 2021-01-01 RX ADMIN — MORPHINE SULFATE 2 MG: 4 INJECTION INTRAVENOUS at 20:24

## 2021-01-01 RX ADMIN — SODIUM CHLORIDE 3 G: 900 INJECTION INTRAVENOUS at 12:09

## 2021-01-01 RX ADMIN — OXYCODONE HYDROCHLORIDE 10 MG: 10 TABLET ORAL at 13:35

## 2021-01-01 RX ADMIN — DOCUSATE SODIUM 50 MG AND SENNOSIDES 8.6 MG 2 TABLET: 8.6; 5 TABLET, FILM COATED ORAL at 05:26

## 2021-01-01 RX ADMIN — THERA TABS 1 TABLET: TAB at 06:04

## 2021-01-01 RX ADMIN — OXYCODONE 5 MG: 5 TABLET ORAL at 05:20

## 2021-01-01 RX ADMIN — ZINC SULFATE 220 MG (50 MG) CAPSULE 220 MG: CAPSULE at 06:54

## 2021-01-01 RX ADMIN — ONDANSETRON 4 MG: 2 INJECTION INTRAMUSCULAR; INTRAVENOUS at 02:40

## 2021-01-01 RX ADMIN — BISACODYL 10 MG: 10 SUPPOSITORY RECTAL at 18:09

## 2021-01-01 RX ADMIN — ONDANSETRON 4 MG: 2 INJECTION INTRAMUSCULAR; INTRAVENOUS at 22:15

## 2021-01-01 RX ADMIN — TAMSULOSIN HYDROCHLORIDE 0.4 MG: 0.4 CAPSULE ORAL at 05:26

## 2021-01-01 RX ADMIN — INSULIN HUMAN 3 UNITS: 100 INJECTION, SOLUTION PARENTERAL at 12:36

## 2021-01-01 RX ADMIN — MORPHINE SULFATE 2 MG: 4 INJECTION INTRAVENOUS at 21:17

## 2021-01-01 RX ADMIN — TAMSULOSIN HYDROCHLORIDE 0.4 MG: 0.4 CAPSULE ORAL at 05:52

## 2021-01-01 RX ADMIN — ACETAMINOPHEN 650 MG: 325 TABLET, FILM COATED ORAL at 04:05

## 2021-01-01 RX ADMIN — DEXTROSE MONOHYDRATE: 50 INJECTION, SOLUTION INTRAVENOUS at 15:00

## 2021-01-01 RX ADMIN — INSULIN HUMAN 1 UNITS: 100 INJECTION, SOLUTION PARENTERAL at 12:07

## 2021-01-01 RX ADMIN — CHOLECALCIFEROL (VITAMIN D3) 10 MCG (400 UNIT) TABLET 800 UNITS: at 15:44

## 2021-01-01 RX ADMIN — ACETAMINOPHEN 650 MG: 325 TABLET, FILM COATED ORAL at 23:12

## 2021-01-01 RX ADMIN — THERA TABS 1 TABLET: TAB at 04:08

## 2021-01-01 RX ADMIN — LACOSAMIDE 100 MG: 100 TABLET, FILM COATED ORAL at 06:28

## 2021-01-01 RX ADMIN — TAMSULOSIN HYDROCHLORIDE 0.4 MG: 0.4 CAPSULE ORAL at 05:34

## 2021-01-01 RX ADMIN — DEXAMETHASONE 3 MG: 6 TABLET ORAL at 04:43

## 2021-01-01 RX ADMIN — ONDANSETRON 4 MG: 2 INJECTION INTRAMUSCULAR; INTRAVENOUS at 22:06

## 2021-01-01 RX ADMIN — MORPHINE SULFATE 2 MG: 4 INJECTION INTRAVENOUS at 00:18

## 2021-01-01 RX ADMIN — INSULIN HUMAN 2 UNITS: 100 INJECTION, SOLUTION PARENTERAL at 08:55

## 2021-01-01 RX ADMIN — FAMOTIDINE 20 MG: 20 TABLET ORAL at 17:59

## 2021-01-01 RX ADMIN — FAMOTIDINE 20 MG: 10 INJECTION INTRAVENOUS at 06:14

## 2021-01-01 RX ADMIN — ACETAMINOPHEN 650 MG: 325 TABLET, FILM COATED ORAL at 12:11

## 2021-01-01 RX ADMIN — OXYCODONE HYDROCHLORIDE 10 MG: 10 TABLET ORAL at 05:25

## 2021-01-01 RX ADMIN — LACOSAMIDE 100 MG: 100 TABLET, FILM COATED ORAL at 05:45

## 2021-01-01 RX ADMIN — PIPERACILLIN AND TAZOBACTAM 3.38 G: 3; .375 INJECTION, POWDER, LYOPHILIZED, FOR SOLUTION INTRAVENOUS; PARENTERAL at 13:20

## 2021-01-01 RX ADMIN — DEXAMETHASONE 4 MG: 4 TABLET ORAL at 16:55

## 2021-01-01 RX ADMIN — INSULIN HUMAN 3 UNITS: 100 INJECTION, SOLUTION PARENTERAL at 20:23

## 2021-01-01 RX ADMIN — LACOSAMIDE 100 MG: 100 TABLET, FILM COATED ORAL at 21:34

## 2021-01-01 RX ADMIN — DOCUSATE SODIUM 50 MG AND SENNOSIDES 8.6 MG 2 TABLET: 8.6; 5 TABLET, FILM COATED ORAL at 16:54

## 2021-01-01 RX ADMIN — AZITHROMYCIN 500 MG: 250 TABLET, FILM COATED ORAL at 16:45

## 2021-01-01 RX ADMIN — FENTANYL CITRATE 50 MCG: 50 INJECTION, SOLUTION INTRAMUSCULAR; INTRAVENOUS at 09:38

## 2021-01-01 RX ADMIN — OXYCODONE HYDROCHLORIDE 10 MG: 10 TABLET ORAL at 02:53

## 2021-01-01 RX ADMIN — DEXAMETHASONE 3 MG: 6 TABLET ORAL at 12:47

## 2021-01-01 RX ADMIN — FUROSEMIDE 40 MG: 40 TABLET ORAL at 04:08

## 2021-01-01 RX ADMIN — TAMSULOSIN HYDROCHLORIDE 0.4 MG: 0.4 CAPSULE ORAL at 05:12

## 2021-01-01 RX ADMIN — ZINC SULFATE 220 MG (50 MG) CAPSULE 220 MG: CAPSULE at 12:18

## 2021-01-01 RX ADMIN — OXYCODONE 10 MG: 5 TABLET ORAL at 20:46

## 2021-01-01 RX ADMIN — Medication 2 G: at 12:50

## 2021-01-01 RX ADMIN — ACETAMINOPHEN 650 MG: 325 TABLET ORAL at 14:06

## 2021-01-01 RX ADMIN — DEXAMETHASONE 4 MG: 4 TABLET ORAL at 05:42

## 2021-01-01 RX ADMIN — DEXAMETHASONE 3 MG: 6 TABLET ORAL at 21:29

## 2021-01-01 RX ADMIN — LEVETIRACETAM INJECTION 500 MG: 5 INJECTION INTRAVENOUS at 14:52

## 2021-01-01 RX ADMIN — OXYCODONE 10 MG: 5 TABLET ORAL at 23:30

## 2021-01-01 RX ADMIN — DEXAMETHASONE 2 MG: 4 TABLET ORAL at 20:23

## 2021-01-01 RX ADMIN — TAMSULOSIN HYDROCHLORIDE 0.4 MG: 0.4 CAPSULE ORAL at 04:43

## 2021-01-01 RX ADMIN — METRONIDAZOLE 500 MG: 500 TABLET ORAL at 05:41

## 2021-01-01 RX ADMIN — LISINOPRIL 2.5 MG: 2.5 TABLET ORAL at 05:19

## 2021-01-01 RX ADMIN — INSULIN HUMAN 1 UNITS: 100 INJECTION, SOLUTION PARENTERAL at 07:46

## 2021-01-01 RX ADMIN — DOCUSATE SODIUM 50 MG AND SENNOSIDES 8.6 MG 2 TABLET: 8.6; 5 TABLET, FILM COATED ORAL at 18:09

## 2021-01-01 RX ADMIN — INSULIN HUMAN 2 UNITS: 100 INJECTION, SOLUTION PARENTERAL at 12:51

## 2021-01-01 RX ADMIN — ACETAMINOPHEN 325 MG: 325 TABLET, FILM COATED ORAL at 09:54

## 2021-01-01 RX ADMIN — ONDANSETRON 4 MG: 2 INJECTION INTRAMUSCULAR; INTRAVENOUS at 08:27

## 2021-01-01 RX ADMIN — Medication 2 G: at 16:59

## 2021-01-01 RX ADMIN — POLYETHYLENE GLYCOL 3350 1 PACKET: 17 POWDER, FOR SOLUTION ORAL at 11:45

## 2021-01-01 RX ADMIN — Medication 2 G: at 12:03

## 2021-01-01 RX ADMIN — ACETAMINOPHEN 325 MG: 325 TABLET, FILM COATED ORAL at 16:39

## 2021-01-01 RX ADMIN — OXYCODONE HYDROCHLORIDE 10 MG: 10 TABLET ORAL at 00:40

## 2021-01-01 RX ADMIN — LACOSAMIDE 100 MG: 100 TABLET, FILM COATED ORAL at 17:12

## 2021-01-01 RX ADMIN — DEXAMETHASONE 4 MG: 4 TABLET ORAL at 00:04

## 2021-01-01 RX ADMIN — METOPROLOL TARTRATE 12.5 MG: 25 TABLET, FILM COATED ORAL at 17:32

## 2021-01-01 RX ADMIN — DEXAMETHASONE SODIUM PHOSPHATE 4 MG: 4 INJECTION, SOLUTION INTRA-ARTICULAR; INTRALESIONAL; INTRAMUSCULAR; INTRAVENOUS; SOFT TISSUE at 00:57

## 2021-01-01 RX ADMIN — TAMSULOSIN HYDROCHLORIDE 0.4 MG: 0.4 CAPSULE ORAL at 17:06

## 2021-01-01 RX ADMIN — INSULIN GLARGINE 15 UNITS: 100 INJECTION, SOLUTION SUBCUTANEOUS at 09:00

## 2021-01-01 RX ADMIN — ACETAMINOPHEN 650 MG: 325 TABLET, FILM COATED ORAL at 12:36

## 2021-01-01 RX ADMIN — Medication 2 G: at 16:54

## 2021-01-01 RX ADMIN — DEXAMETHASONE SODIUM PHOSPHATE 6 MG: 4 INJECTION, SOLUTION INTRA-ARTICULAR; INTRALESIONAL; INTRAMUSCULAR; INTRAVENOUS; SOFT TISSUE at 06:00

## 2021-01-01 RX ADMIN — SODIUM CHLORIDE 4 MILLION UNITS: 9 INJECTION, SOLUTION INTRAVENOUS at 04:10

## 2021-01-01 RX ADMIN — MIDAZOLAM HYDROCHLORIDE 2 MG: 1 INJECTION, SOLUTION INTRAMUSCULAR; INTRAVENOUS at 11:05

## 2021-01-01 RX ADMIN — DOCUSATE SODIUM 50 MG AND SENNOSIDES 8.6 MG 2 TABLET: 8.6; 5 TABLET, FILM COATED ORAL at 04:57

## 2021-01-01 RX ADMIN — FLUDROCORTISONE ACETATE 0.1 MG: 0.1 TABLET ORAL at 05:46

## 2021-01-01 RX ADMIN — DEXAMETHASONE 2 MG: 4 TABLET ORAL at 05:26

## 2021-01-01 RX ADMIN — CHOLECALCIFEROL (VITAMIN D3) 10 MCG (400 UNIT) TABLET 800 UNITS: at 06:21

## 2021-01-01 RX ADMIN — INSULIN HUMAN 2 UNITS: 100 INJECTION, SOLUTION PARENTERAL at 17:22

## 2021-01-01 RX ADMIN — LISINOPRIL 2.5 MG: 5 TABLET ORAL at 06:53

## 2021-01-01 RX ADMIN — ACETAMINOPHEN 650 MG: 325 TABLET, FILM COATED ORAL at 05:41

## 2021-01-01 RX ADMIN — DEXAMETHASONE 4 MG: 4 TABLET ORAL at 12:00

## 2021-01-01 RX ADMIN — MORPHINE SULFATE 10 MG: 10 INJECTION INTRAVENOUS at 16:06

## 2021-01-01 RX ADMIN — ACETAMINOPHEN 325 MG: 325 TABLET, FILM COATED ORAL at 08:50

## 2021-01-01 RX ADMIN — INSULIN HUMAN 2 UNITS: 100 INJECTION, SOLUTION PARENTERAL at 16:22

## 2021-01-01 RX ADMIN — LEVETIRACETAM 500 MG: 500 TABLET ORAL at 17:59

## 2021-01-01 RX ADMIN — DEXAMETHASONE 2 MG: 4 TABLET ORAL at 05:43

## 2021-01-01 RX ADMIN — ALLOPURINOL 100 MG: 100 TABLET ORAL at 05:42

## 2021-01-01 RX ADMIN — INSULIN HUMAN 3 UNITS: 100 INJECTION, SOLUTION PARENTERAL at 20:39

## 2021-01-01 RX ADMIN — DEXAMETHASONE 3 MG: 6 TABLET ORAL at 15:33

## 2021-01-01 RX ADMIN — OXYCODONE HYDROCHLORIDE 10 MG: 10 TABLET ORAL at 17:11

## 2021-01-01 ASSESSMENT — ENCOUNTER SYMPTOMS
SORE THROAT: 0
SHORTNESS OF BREATH: 0
BACK PAIN: 1
COUGH: 0
SHORTNESS OF BREATH: 0
PHOTOPHOBIA: 0
HEMOPTYSIS: 0
VOMITING: 1
CARDIOVASCULAR NEGATIVE: 1
BLURRED VISION: 0
VOMITING: 0
MYALGIAS: 1
DIARRHEA: 0
ABDOMINAL PAIN: 0
HEADACHES: 0
CONSTIPATION: 0
NAUSEA: 0
HEMOPTYSIS: 0
NECK PAIN: 0
DIZZINESS: 1
CHILLS: 0
EYE PAIN: 0
FEVER: 0
FEVER: 0
CHILLS: 0
WEAKNESS: 1
MYALGIAS: 0
SHORTNESS OF BREATH: 0
SPUTUM PRODUCTION: 0
CHILLS: 0
HEADACHES: 1
WOUND: 1
ABDOMINAL PAIN: 0
HEARTBURN: 0
CHILLS: 0
BLURRED VISION: 0
HEARTBURN: 0
EYE PAIN: 0
NUMBNESS: 0
DIZZINESS: 1
HEADACHES: 0
NECK PAIN: 0
HEMOPTYSIS: 0
DIZZINESS: 0
CHILLS: 0
SORE THROAT: 0
SPUTUM PRODUCTION: 0
LOSS OF CONSCIOUSNESS: 0
EYE PAIN: 0
GASTROINTESTINAL NEGATIVE: 1
ABDOMINAL PAIN: 0
ABDOMINAL PAIN: 0
SPUTUM PRODUCTION: 0
DEPRESSION: 0
BACK PAIN: 1
COUGH: 0
COUGH: 0
HEARTBURN: 0
HALLUCINATIONS: 0
HALLUCINATIONS: 0
WEAKNESS: 1
ABDOMINAL PAIN: 0
COUGH: 0
FEVER: 0
WEAKNESS: 1
COUGH: 0
HALLUCINATIONS: 0
DOUBLE VISION: 0
WEAKNESS: 0
BRUISES/BLEEDS EASILY: 0
CHILLS: 0
BACK PAIN: 0
ABDOMINAL PAIN: 0
SPUTUM PRODUCTION: 0
DIARRHEA: 0
SEIZURES: 0
NAUSEA: 0
HEMOPTYSIS: 0
BRUISES/BLEEDS EASILY: 0
DOUBLE VISION: 0
CONSTIPATION: 0
BACK PAIN: 0
PALPITATIONS: 0
HEARTBURN: 0
COUGH: 0
EYE DISCHARGE: 0
FLANK PAIN: 0
VOMITING: 0
HEADACHES: 0
PHOTOPHOBIA: 0
EYES NEGATIVE: 1
BLURRED VISION: 0
DOUBLE VISION: 0
FEVER: 0
HEADACHES: 1
CONSTIPATION: 0
NAUSEA: 0
CHILLS: 0
WEAKNESS: 0
BLOOD IN STOOL: 0
BACK PAIN: 1
DIZZINESS: 0
BRUISES/BLEEDS EASILY: 0
NERVOUS/ANXIOUS: 0
FEVER: 0
COUGH: 1
PALPITATIONS: 0
COUGH: 0
DOUBLE VISION: 0
SORE THROAT: 0
HEADACHES: 1
DIARRHEA: 0
SPUTUM PRODUCTION: 0
PALPITATIONS: 0
ABDOMINAL PAIN: 0
DIARRHEA: 1
WEAKNESS: 1
HEADACHES: 1
ORTHOPNEA: 0
PALPITATIONS: 0
SHORTNESS OF BREATH: 0
BACK PAIN: 0
DIAPHORESIS: 0
SHORTNESS OF BREATH: 0
COUGH: 1
PALPITATIONS: 0
FOCAL WEAKNESS: 1
SPUTUM PRODUCTION: 0
BACK PAIN: 1
SINUS PAIN: 0
SEIZURES: 0
FEVER: 0
SHORTNESS OF BREATH: 0
FEVER: 0
BACK PAIN: 0
BLURRED VISION: 0
NERVOUS/ANXIOUS: 0
WEAKNESS: 0
BACK PAIN: 0
DIARRHEA: 0
EYE DISCHARGE: 0
HEMOPTYSIS: 0
EYE PAIN: 0
PALPITATIONS: 0
HEADACHES: 1
LOSS OF CONSCIOUSNESS: 0
HEMOPTYSIS: 0
HEADACHES: 1
STRIDOR: 0
FEVER: 0
SHORTNESS OF BREATH: 0
CHILLS: 0
HEARTBURN: 0
SHORTNESS OF BREATH: 0
DIZZINESS: 0
DIARRHEA: 0
HEADACHES: 1
BLURRED VISION: 0
SHORTNESS OF BREATH: 0
HALLUCINATIONS: 0
SHORTNESS OF BREATH: 0
DIARRHEA: 1
HEMOPTYSIS: 0
COUGH: 0
ABDOMINAL PAIN: 0
FEVER: 0
ABDOMINAL DISTENTION: 0
FEVER: 0
DIZZINESS: 0
VOMITING: 0
TROUBLE SWALLOWING: 0
COUGH: 0
PALPITATIONS: 0
FEVER: 0
FEVER: 0
DEPRESSION: 0
NERVOUS/ANXIOUS: 0
DOUBLE VISION: 0
MYALGIAS: 0
HEMOPTYSIS: 0
HEARTBURN: 0
NAUSEA: 0
ABDOMINAL PAIN: 0
HEADACHES: 1
PALPITATIONS: 0
ROS SKIN COMMENTS: BRUISES ON ARMS
PALPITATIONS: 0
NAUSEA: 0
SHORTNESS OF BREATH: 0
EYE DISCHARGE: 0
PALPITATIONS: 1
MYALGIAS: 0
CHILLS: 0
COLOR CHANGE: 0
CHILLS: 0
NAUSEA: 0
NAUSEA: 0
PHOTOPHOBIA: 0
BLURRED VISION: 0
HEMOPTYSIS: 0
STRIDOR: 0
PALPITATIONS: 0
HALLUCINATIONS: 0
LOSS OF CONSCIOUSNESS: 0
SORE THROAT: 0
HEARTBURN: 0
EYE PAIN: 0
HEADACHES: 1
HEARTBURN: 0
BACK PAIN: 0
CHILLS: 0
BACK PAIN: 0
MYALGIAS: 0
FEVER: 0
NECK PAIN: 0
BLURRED VISION: 0
FEVER: 0
BLURRED VISION: 0
BACK PAIN: 1
NAUSEA: 0
BACK PAIN: 1
NERVOUS/ANXIOUS: 1
BACK PAIN: 0
HEADACHES: 1
ABDOMINAL PAIN: 0
BLURRED VISION: 0
CHILLS: 0
NERVOUS/ANXIOUS: 0
HEARTBURN: 0
BRUISES/BLEEDS EASILY: 0
LOSS OF CONSCIOUSNESS: 0
RESPIRATORY NEGATIVE: 1
ABDOMINAL PAIN: 0
FEVER: 0
SHORTNESS OF BREATH: 0
WEAKNESS: 1
WEAKNESS: 0
PALPITATIONS: 0
PALPITATIONS: 0
VOMITING: 0
STRIDOR: 0
VOMITING: 0
COUGH: 1
VOMITING: 0
DIARRHEA: 0
CHILLS: 0
SPUTUM PRODUCTION: 0
WEAKNESS: 0
HEADACHES: 1
VOMITING: 0
HEMOPTYSIS: 0
ABDOMINAL PAIN: 0
DIARRHEA: 0
DEPRESSION: 0
LOSS OF CONSCIOUSNESS: 0
DIARRHEA: 1
WEAKNESS: 0
CHILLS: 0
SHORTNESS OF BREATH: 0
HEADACHES: 0
DIARRHEA: 0
FEVER: 0
NAUSEA: 0
HEARTBURN: 0
SHORTNESS OF BREATH: 0
ORTHOPNEA: 0
FOCAL WEAKNESS: 0
NAUSEA: 1
HEADACHES: 1
LOSS OF CONSCIOUSNESS: 0
FEVER: 0
NAUSEA: 0
SHORTNESS OF BREATH: 0
CHILLS: 0
BACK PAIN: 1
NAUSEA: 0
CLAUDICATION: 0
NAUSEA: 0
DIZZINESS: 0
EYE DISCHARGE: 0
EYE DISCHARGE: 0
COUGH: 0
STRIDOR: 0
PALPITATIONS: 0
ABDOMINAL DISTENTION: 0
DOUBLE VISION: 0
NAUSEA: 0
HEADACHES: 1
DOUBLE VISION: 0
PHOTOPHOBIA: 0
HALLUCINATIONS: 0
DOUBLE VISION: 0
ABDOMINAL PAIN: 1
SPEECH CHANGE: 0
CHILLS: 0
VOMITING: 0
DIZZINESS: 0
PALPITATIONS: 0
DIZZINESS: 0
DIAPHORESIS: 0
VOMITING: 0
CHILLS: 0
ORTHOPNEA: 0
LOSS OF CONSCIOUSNESS: 0
FEVER: 0
COUGH: 0
FEVER: 0
COUGH: 0
VOMITING: 0
COUGH: 0
EYE DISCHARGE: 0
BACK PAIN: 0
BACK PAIN: 1
CHILLS: 1
SINUS PAIN: 0
BACK PAIN: 1
PALPITATIONS: 0
NERVOUS/ANXIOUS: 0
COUGH: 1
CHILLS: 0
CHILLS: 0
TINGLING: 0
COUGH: 0
SENSORY CHANGE: 0
DOUBLE VISION: 0
CHILLS: 0
NAUSEA: 0
DIARRHEA: 0
MYALGIAS: 1
FEVER: 0
BLOOD IN STOOL: 0
PALPITATIONS: 0
HEARTBURN: 0
BLURRED VISION: 0
SPUTUM PRODUCTION: 0
DIZZINESS: 0
HEARTBURN: 0
NAUSEA: 1
NAUSEA: 0
HEMOPTYSIS: 0
EYE REDNESS: 0
VOMITING: 0
DIARRHEA: 0
DIARRHEA: 0
COUGH: 0
ABDOMINAL PAIN: 0
TINGLING: 0
PHOTOPHOBIA: 0
VOMITING: 0
PALPITATIONS: 0
NAUSEA: 0
COUGH: 1
SPEECH CHANGE: 0
DEPRESSION: 0
CHILLS: 0
DIZZINESS: 0
CHILLS: 0
MYALGIAS: 0
DOUBLE VISION: 0
VOMITING: 0
EYE REDNESS: 0
CARDIOVASCULAR NEGATIVE: 1
COUGH: 0
HEMOPTYSIS: 0
DIZZINESS: 0
COUGH: 0
NECK PAIN: 0
VOMITING: 0
SHORTNESS OF BREATH: 0
VOMITING: 0
FOCAL WEAKNESS: 1
CONSTIPATION: 0
LOSS OF CONSCIOUSNESS: 0
FEVER: 0
DIZZINESS: 0
BLURRED VISION: 0
HEARTBURN: 0
DIARRHEA: 0
COUGH: 0
NAUSEA: 1
SPUTUM PRODUCTION: 0
NUMBNESS: 0
HEARTBURN: 0
TROUBLE SWALLOWING: 0
SINUS PAIN: 0
COUGH: 0
NAUSEA: 0
BLURRED VISION: 0
FEVER: 0
DOUBLE VISION: 0
BACK PAIN: 0
EYE PAIN: 0
FEVER: 0
BLURRED VISION: 0
WEAKNESS: 1
FOCAL WEAKNESS: 0
VOMITING: 0
NAUSEA: 0
JOINT SWELLING: 1
NAUSEA: 1
ABDOMINAL PAIN: 1
CONSTIPATION: 0
HEADACHES: 0
HEARTBURN: 0
NAUSEA: 0
DIARRHEA: 0
AGITATION: 0
SORE THROAT: 0
FEVER: 0
PALPITATIONS: 0
DOUBLE VISION: 0
DEPRESSION: 0
CHILLS: 0
HEADACHES: 1
HALLUCINATIONS: 0
DEPRESSION: 0
WEAKNESS: 1
WOUND: 1
PALPITATIONS: 0
MYALGIAS: 1
HEADACHES: 1
BLURRED VISION: 0
DEPRESSION: 0
WEAKNESS: 1
DIZZINESS: 0
CHILLS: 0
DEPRESSION: 0
ABDOMINAL PAIN: 0
SPUTUM PRODUCTION: 0
EYE REDNESS: 0
SPUTUM PRODUCTION: 0
EYE PAIN: 0
CHILLS: 0
CONSTIPATION: 0
CHILLS: 0
RESPIRATORY NEGATIVE: 1
CHEST TIGHTNESS: 0
STRIDOR: 0
JOINT SWELLING: 1
BLOOD IN STOOL: 0
ABDOMINAL PAIN: 0
MYALGIAS: 0
VOMITING: 1
FEVER: 0
CHILLS: 0
DIZZINESS: 0
WEAKNESS: 1
FEVER: 0
CHILLS: 0
PALPITATIONS: 1
SPEECH CHANGE: 0
DOUBLE VISION: 0
NUMBNESS: 0
DIARRHEA: 0
FEVER: 0
DIAPHORESIS: 0
FEVER: 0
ABDOMINAL PAIN: 0
HEARTBURN: 0
NAUSEA: 1
MUSCULOSKELETAL NEGATIVE: 1
SHORTNESS OF BREATH: 0
BLURRED VISION: 0
DIZZINESS: 0
WEAKNESS: 1
VOMITING: 0
TROUBLE SWALLOWING: 0
SPUTUM PRODUCTION: 0
CHILLS: 0
COUGH: 0
FOCAL WEAKNESS: 0
ORTHOPNEA: 0
SPUTUM PRODUCTION: 0
WEAKNESS: 1
SORE THROAT: 0
PHOTOPHOBIA: 0
DOUBLE VISION: 0
NAUSEA: 0
FEVER: 0
NAUSEA: 0
HEADACHES: 1
BACK PAIN: 1
HEARTBURN: 0
BLURRED VISION: 0
SPUTUM PRODUCTION: 0
MYALGIAS: 0
FOCAL WEAKNESS: 0
HEMOPTYSIS: 0
SHORTNESS OF BREATH: 0
HEADACHES: 1
EYE DISCHARGE: 0
BLURRED VISION: 0
LOSS OF CONSCIOUSNESS: 0
COUGH: 1
COUGH: 0
ORTHOPNEA: 0
DEPRESSION: 0
LOSS OF CONSCIOUSNESS: 0
NAUSEA: 0
EYE DISCHARGE: 0
HEMOPTYSIS: 0
FEVER: 0
HEARTBURN: 0
MYALGIAS: 1
DIARRHEA: 0
COUGH: 0
DIZZINESS: 0
PALPITATIONS: 0
HALLUCINATIONS: 0
SENSORY CHANGE: 0
EYES NEGATIVE: 1
NERVOUS/ANXIOUS: 0
NECK PAIN: 0
CHEST TIGHTNESS: 0
BRUISES/BLEEDS EASILY: 0
ABDOMINAL PAIN: 0
BACK PAIN: 0
DIARRHEA: 0
ABDOMINAL PAIN: 0
PND: 0
VOMITING: 0
BACK PAIN: 0
COUGH: 0
DEPRESSION: 0
DOUBLE VISION: 0
HEADACHES: 1
SPUTUM PRODUCTION: 0
CHILLS: 0
NEUROLOGICAL NEGATIVE: 1
COUGH: 1
ABDOMINAL PAIN: 0
PHOTOPHOBIA: 0
MYALGIAS: 0
FEVER: 0
CONFUSION: 0
NECK PAIN: 0
WEIGHT LOSS: 0
PALPITATIONS: 0
DIARRHEA: 0
WOUND: 1
ORTHOPNEA: 0
ABDOMINAL PAIN: 0
HALLUCINATIONS: 0
MYALGIAS: 1
NAUSEA: 1
SORE THROAT: 0
SORE THROAT: 0
NAUSEA: 0
WEAKNESS: 1
CHILLS: 0
NAUSEA: 0
VOMITING: 0
DIZZINESS: 0
DIARRHEA: 0
CONSTIPATION: 0
DIZZINESS: 0
EYES NEGATIVE: 1
HEADACHES: 0
LOSS OF CONSCIOUSNESS: 0
DIZZINESS: 0
HEMOPTYSIS: 0
DIARRHEA: 0
VOMITING: 0
DIARRHEA: 0
WEAKNESS: 0
FEVER: 0
WEAKNESS: 0
CHILLS: 0
FEVER: 0
ABDOMINAL PAIN: 0
SHORTNESS OF BREATH: 0
ABDOMINAL PAIN: 0
SHORTNESS OF BREATH: 0
NAUSEA: 1
CONFUSION: 0
DIARRHEA: 0
SORE THROAT: 0
STRIDOR: 0
SORE THROAT: 0
DOUBLE VISION: 0
DIZZINESS: 0
VOMITING: 0
COUGH: 0
SPUTUM PRODUCTION: 0
SINUS PAIN: 0
NAUSEA: 0
BLURRED VISION: 0
CHILLS: 0
MYALGIAS: 1
ABDOMINAL PAIN: 0
SPUTUM PRODUCTION: 0
NAUSEA: 0
WEAKNESS: 0
PALPITATIONS: 0
BLURRED VISION: 0
PALPITATIONS: 0
WEAKNESS: 1
SORE THROAT: 0
HEMOPTYSIS: 0
HEADACHES: 0
DIAPHORESIS: 0
EYE REDNESS: 0
VOMITING: 0
PALPITATIONS: 0
CHILLS: 0
EYE PAIN: 0
BLOOD IN STOOL: 0
BLURRED VISION: 0
VOMITING: 0
LOSS OF CONSCIOUSNESS: 0
NAUSEA: 0
HEMOPTYSIS: 0
NECK PAIN: 0
JOINT SWELLING: 1
LOSS OF CONSCIOUSNESS: 0
BACK PAIN: 0
HEMOPTYSIS: 0
CONSTIPATION: 0
WEAKNESS: 1
HEMOPTYSIS: 0
NAUSEA: 0
FOCAL WEAKNESS: 0
WEAKNESS: 1
DIZZINESS: 0
CHILLS: 0
PALPITATIONS: 0
FEVER: 0
EYE DISCHARGE: 0
HEADACHES: 0
CONSTITUTIONAL NEGATIVE: 1
SPUTUM PRODUCTION: 0
SPUTUM PRODUCTION: 0
EYE REDNESS: 0
VOMITING: 0
BRUISES/BLEEDS EASILY: 0
NAUSEA: 0
HEADACHES: 1
DOUBLE VISION: 0
SPUTUM PRODUCTION: 0
VOMITING: 0
HEADACHES: 0
COUGH: 0
ABDOMINAL PAIN: 0
LOSS OF CONSCIOUSNESS: 0
HEMOPTYSIS: 0
PALPITATIONS: 0
DIARRHEA: 0
DIZZINESS: 0
BACK PAIN: 0
SPUTUM PRODUCTION: 0
PALPITATIONS: 0
CHILLS: 0
BLOOD IN STOOL: 0
ABDOMINAL PAIN: 0
CHILLS: 0
ORTHOPNEA: 0
FEVER: 0
HEMOPTYSIS: 0
WEAKNESS: 1
BLURRED VISION: 0
NECK PAIN: 0
SPUTUM PRODUCTION: 0
DIZZINESS: 0
SORE THROAT: 0
ABDOMINAL PAIN: 0
DOUBLE VISION: 0
FOCAL WEAKNESS: 0
BACK PAIN: 0
SINUS PAIN: 0
ABDOMINAL PAIN: 0
ABDOMINAL PAIN: 0
PALPITATIONS: 0
VOMITING: 0
COLOR CHANGE: 0
NERVOUS/ANXIOUS: 0
BLURRED VISION: 0
BLURRED VISION: 0
COUGH: 0
SHORTNESS OF BREATH: 0
FEVER: 0
SORE THROAT: 0
DOUBLE VISION: 0
SHORTNESS OF BREATH: 1
ABDOMINAL PAIN: 0
DIZZINESS: 0
WEIGHT LOSS: 0
DIAPHORESIS: 0
DOUBLE VISION: 0
SORE THROAT: 0
BLOOD IN STOOL: 0
FOCAL WEAKNESS: 1
ABDOMINAL PAIN: 0
MYALGIAS: 0
BACK PAIN: 0
NAUSEA: 0
NECK PAIN: 1
CHILLS: 0
DOUBLE VISION: 0
NERVOUS/ANXIOUS: 0
NERVOUS/ANXIOUS: 0
HEARTBURN: 0
FOCAL WEAKNESS: 0
STRIDOR: 0
BLURRED VISION: 0
NERVOUS/ANXIOUS: 1
BLOOD IN STOOL: 0
HEADACHES: 1
HEMOPTYSIS: 0
ORTHOPNEA: 0
NERVOUS/ANXIOUS: 0
SHORTNESS OF BREATH: 0
FOCAL WEAKNESS: 0
SORE THROAT: 0
BACK PAIN: 0
BRUISES/BLEEDS EASILY: 0
NECK PAIN: 1
HEADACHES: 1
COUGH: 0
PALPITATIONS: 0
FOCAL WEAKNESS: 0
DIZZINESS: 1
PALPITATIONS: 0
FOCAL WEAKNESS: 0
NAUSEA: 0
HEADACHES: 1
DIZZINESS: 0
DIZZINESS: 0
NAUSEA: 0
NEUROLOGICAL NEGATIVE: 1
SHORTNESS OF BREATH: 0
SENSORY CHANGE: 0
ABDOMINAL PAIN: 0
DIZZINESS: 0
EYE DISCHARGE: 0
VOMITING: 0
ABDOMINAL PAIN: 0
VOMITING: 0
SEIZURES: 0
BRUISES/BLEEDS EASILY: 0
SHORTNESS OF BREATH: 0
PALPITATIONS: 0
VOMITING: 0
SORE THROAT: 0
CHILLS: 0
COLOR CHANGE: 0
SPUTUM PRODUCTION: 0
PALPITATIONS: 0
BACK PAIN: 0
BACK PAIN: 0
SORE THROAT: 0
NERVOUS/ANXIOUS: 0
HEADACHES: 1
ORTHOPNEA: 0
LOSS OF CONSCIOUSNESS: 0
HEADACHES: 1
VOMITING: 0
BACK PAIN: 0
DOUBLE VISION: 0
LOSS OF CONSCIOUSNESS: 0
BLOOD IN STOOL: 0
COUGH: 0
AGITATION: 0
NECK PAIN: 1
DIARRHEA: 0
HEADACHES: 1
BRUISES/BLEEDS EASILY: 0
COUGH: 1
NAUSEA: 1
VOMITING: 0
PALPITATIONS: 0
FEVER: 0
DOUBLE VISION: 0
WEAKNESS: 1
SHORTNESS OF BREATH: 0
COUGH: 1
CHEST TIGHTNESS: 0
CONFUSION: 0
SPUTUM PRODUCTION: 0
PALPITATIONS: 0
HEADACHES: 1
SPUTUM PRODUCTION: 0
PALPITATIONS: 0
PHOTOPHOBIA: 0
EYE DISCHARGE: 0
MYALGIAS: 0
CLAUDICATION: 0
BACK PAIN: 0
LOSS OF CONSCIOUSNESS: 0
AGITATION: 0
ORTHOPNEA: 0
WHEEZING: 0
DOUBLE VISION: 0
COUGH: 0
BLURRED VISION: 0
MYALGIAS: 0
BACK PAIN: 0
NECK PAIN: 0
CHILLS: 0
ABDOMINAL DISTENTION: 0
ABDOMINAL PAIN: 0
FEVER: 0
DOUBLE VISION: 0
BLOOD IN STOOL: 0
FEVER: 0
DOUBLE VISION: 0
PALPITATIONS: 0
HALLUCINATIONS: 0
VOMITING: 0
NAUSEA: 0
BLURRED VISION: 0
NECK PAIN: 0
DIARRHEA: 0
DIZZINESS: 0
PALPITATIONS: 0
WEAKNESS: 0
FEVER: 0
NAUSEA: 0
BACK PAIN: 1
SORE THROAT: 0
SHORTNESS OF BREATH: 0
ORTHOPNEA: 0
HEADACHES: 1
ABDOMINAL PAIN: 0
BLURRED VISION: 0

## 2021-01-01 ASSESSMENT — PAIN DESCRIPTION - PAIN TYPE
TYPE: ACUTE PAIN
TYPE: ACUTE PAIN;CHRONIC PAIN
TYPE: ACUTE PAIN;CHRONIC PAIN
TYPE: ACUTE PAIN
TYPE: ACUTE PAIN;CHRONIC PAIN
TYPE: ACUTE PAIN
TYPE: SURGICAL PAIN
TYPE: ACUTE PAIN
TYPE: ACUTE PAIN
TYPE: CHRONIC PAIN
TYPE: ACUTE PAIN
TYPE: SURGICAL PAIN
TYPE: ACUTE PAIN
TYPE: ACUTE PAIN;SURGICAL PAIN
TYPE: ACUTE PAIN
TYPE: ACUTE PAIN
TYPE: SURGICAL PAIN
TYPE: ACUTE PAIN
TYPE: SURGICAL PAIN
TYPE: ACUTE PAIN
TYPE: SURGICAL PAIN
TYPE: SURGICAL PAIN
TYPE: ACUTE PAIN
TYPE: ACUTE PAIN
TYPE: ACUTE PAIN;SURGICAL PAIN
TYPE: ACUTE PAIN
TYPE: ACUTE PAIN
TYPE: CHRONIC PAIN
TYPE: ACUTE PAIN
TYPE: ACUTE PAIN;SURGICAL PAIN
TYPE: ACUTE PAIN
TYPE: SURGICAL PAIN
TYPE: ACUTE PAIN;SURGICAL PAIN
TYPE: ACUTE PAIN
TYPE: CHRONIC PAIN
TYPE: CHRONIC PAIN
TYPE: ACUTE PAIN
TYPE: ACUTE PAIN
TYPE: CHRONIC PAIN
TYPE: ACUTE PAIN
TYPE: SURGICAL PAIN
TYPE: ACUTE PAIN;SURGICAL PAIN
TYPE: ACUTE PAIN
TYPE: ACUTE PAIN;SURGICAL PAIN
TYPE: ACUTE PAIN
TYPE: ACUTE PAIN;CHRONIC PAIN
TYPE: ACUTE PAIN
TYPE: ACUTE PAIN;CHRONIC PAIN
TYPE: ACUTE PAIN
TYPE: SURGICAL PAIN
TYPE: ACUTE PAIN
TYPE: SURGICAL PAIN
TYPE: ACUTE PAIN
TYPE: CHRONIC PAIN
TYPE: ACUTE PAIN
TYPE: CHRONIC PAIN
TYPE: ACUTE PAIN
TYPE: SURGICAL PAIN
TYPE: SURGICAL PAIN
TYPE: ACUTE PAIN
TYPE: ACUTE PAIN;SURGICAL PAIN
TYPE: ACUTE PAIN
TYPE: ACUTE PAIN;CHRONIC PAIN
TYPE: ACUTE PAIN
TYPE: CHRONIC PAIN
TYPE: ACUTE PAIN
TYPE: ACUTE PAIN;SURGICAL PAIN
TYPE: ACUTE PAIN
TYPE: ACUTE PAIN;CHRONIC PAIN
TYPE: ACUTE PAIN
TYPE: SURGICAL PAIN
TYPE: ACUTE PAIN
TYPE: ACUTE PAIN;CHRONIC PAIN
TYPE: ACUTE PAIN
TYPE: ACUTE PAIN
TYPE: SURGICAL PAIN
TYPE: ACUTE PAIN
TYPE: CHRONIC PAIN
TYPE: ACUTE PAIN
TYPE: ACUTE PAIN;CHRONIC PAIN
TYPE: ACUTE PAIN;CHRONIC PAIN
TYPE: ACUTE PAIN
TYPE: SURGICAL PAIN
TYPE: ACUTE PAIN
TYPE: CHRONIC PAIN
TYPE: ACUTE PAIN
TYPE: CHRONIC PAIN
TYPE: ACUTE PAIN
TYPE: CHRONIC PAIN
TYPE: ACUTE PAIN
TYPE: CHRONIC PAIN
TYPE: CHRONIC PAIN
TYPE: ACUTE PAIN
TYPE: ACUTE PAIN
TYPE: ACUTE PAIN;SURGICAL PAIN
TYPE: ACUTE PAIN;SURGICAL PAIN
TYPE: ACUTE PAIN
TYPE: ACUTE PAIN;CHRONIC PAIN
TYPE: ACUTE PAIN
TYPE: ACUTE PAIN;CHRONIC PAIN
TYPE: ACUTE PAIN
TYPE: SURGICAL PAIN
TYPE: ACUTE PAIN
TYPE: SURGICAL PAIN
TYPE: CHRONIC PAIN
TYPE: ACUTE PAIN;SURGICAL PAIN
TYPE: CHRONIC PAIN
TYPE: ACUTE PAIN
TYPE: CHRONIC PAIN
TYPE: ACUTE PAIN
TYPE: SURGICAL PAIN
TYPE: ACUTE PAIN
TYPE: CHRONIC PAIN
TYPE: ACUTE PAIN
TYPE: CHRONIC PAIN
TYPE: ACUTE PAIN;SURGICAL PAIN

## 2021-01-01 ASSESSMENT — LIFESTYLE VARIABLES
SUBSTANCE_ABUSE: 0
TOTAL SCORE: 0
TOTAL SCORE: 0
EVER FELT BAD OR GUILTY ABOUT YOUR DRINKING: NO
HOW MANY TIMES IN THE PAST YEAR HAVE YOU HAD 5 OR MORE DRINKS IN A DAY: 0
EVER HAD A DRINK FIRST THING IN THE MORNING TO STEADY YOUR NERVES TO GET RID OF A HANGOVER: NO
HAVE PEOPLE ANNOYED YOU BY CRITICIZING YOUR DRINKING: NO
SUBSTANCE_ABUSE: 0
TOTAL SCORE: 0
EVER HAD A DRINK FIRST THING IN THE MORNING TO STEADY YOUR NERVES TO GET RID OF A HANGOVER: NO
AVERAGE NUMBER OF DAYS PER WEEK YOU HAVE A DRINK CONTAINING ALCOHOL: 0
AVERAGE NUMBER OF DAYS PER WEEK YOU HAVE A DRINK CONTAINING ALCOHOL: 0
SUBSTANCE_ABUSE: 0
CONSUMPTION TOTAL: NEGATIVE
DOES PATIENT WANT TO STOP DRINKING: NO
ON A TYPICAL DAY WHEN YOU DRINK ALCOHOL HOW MANY DRINKS DO YOU HAVE: 0
HAVE YOU EVER FELT YOU SHOULD CUT DOWN ON YOUR DRINKING: NO
TOTAL SCORE: 0
SUBSTANCE_ABUSE: 0
TOTAL SCORE: 0
ON A TYPICAL DAY WHEN YOU DRINK ALCOHOL HOW MANY DRINKS DO YOU HAVE: 0
AVERAGE NUMBER OF DAYS PER WEEK YOU HAVE A DRINK CONTAINING ALCOHOL: 0
SUBSTANCE_ABUSE: 0
ALCOHOL_USE: NO
CONSUMPTION TOTAL: NEGATIVE
HOW MANY TIMES IN THE PAST YEAR HAVE YOU HAD 5 OR MORE DRINKS IN A DAY: 0
ALCOHOL_USE: NO
EVER FELT BAD OR GUILTY ABOUT YOUR DRINKING: NO
DO YOU DRINK ALCOHOL: NO
HOW MANY TIMES IN THE PAST YEAR HAVE YOU HAD 5 OR MORE DRINKS IN A DAY: 0
SUBSTANCE_ABUSE: 0
SUBSTANCE_ABUSE: 0
HAVE YOU EVER FELT YOU SHOULD CUT DOWN ON YOUR DRINKING: NO
TOTAL SCORE: 0
DOES PATIENT WANT TO STOP DRINKING: CANNOT ASSESS
TOTAL SCORE: 0
HAVE PEOPLE ANNOYED YOU BY CRITICIZING YOUR DRINKING: NO
SUBSTANCE_ABUSE: 0
ALCOHOL_USE: NO
EVER HAD A DRINK FIRST THING IN THE MORNING TO STEADY YOUR NERVES TO GET RID OF A HANGOVER: NO
EVER_SMOKED: YES
DOES PATIENT WANT TO STOP DRINKING: CANNOT ASSESS
CONSUMPTION TOTAL: NEGATIVE
TOTAL SCORE: 0
ON A TYPICAL DAY WHEN YOU DRINK ALCOHOL HOW MANY DRINKS DO YOU HAVE: 0
HAVE YOU EVER FELT YOU SHOULD CUT DOWN ON YOUR DRINKING: NO
REASON UNABLE TO ASSESS: PATIENT CONFUSED
HAVE PEOPLE ANNOYED YOU BY CRITICIZING YOUR DRINKING: NO
TOTAL SCORE: 0
EVER FELT BAD OR GUILTY ABOUT YOUR DRINKING: NO

## 2021-01-01 ASSESSMENT — COGNITIVE AND FUNCTIONAL STATUS - GENERAL
TURNING FROM BACK TO SIDE WHILE IN FLAT BAD: UNABLE
SUGGESTED CMS G CODE MODIFIER DAILY ACTIVITY: CK
DAILY ACTIVITIY SCORE: 18
MOVING TO AND FROM BED TO CHAIR: A LITTLE
WALKING IN HOSPITAL ROOM: A LITTLE
TURNING FROM BACK TO SIDE WHILE IN FLAT BAD: UNABLE
MOVING FROM LYING ON BACK TO SITTING ON SIDE OF FLAT BED: A LOT
TOILETING: A LOT
DRESSING REGULAR LOWER BODY CLOTHING: TOTAL
MOVING TO AND FROM BED TO CHAIR: UNABLE
MOVING TO AND FROM BED TO CHAIR: UNABLE
STANDING UP FROM CHAIR USING ARMS: A LITTLE
DAILY ACTIVITIY SCORE: 23
HELP NEEDED FOR BATHING: TOTAL
DAILY ACTIVITIY SCORE: 15
CLIMB 3 TO 5 STEPS WITH RAILING: A LOT
PERSONAL GROOMING: A LITTLE
DAILY ACTIVITIY SCORE: 9
EATING MEALS: A LITTLE
HELP NEEDED FOR BATHING: A LOT
PERSONAL GROOMING: A LITTLE
DAILY ACTIVITIY SCORE: 15
TOILETING: A LOT
MOVING TO AND FROM BED TO CHAIR: UNABLE
CLIMB 3 TO 5 STEPS WITH RAILING: A LITTLE
TURNING FROM BACK TO SIDE WHILE IN FLAT BAD: A LITTLE
EATING MEALS: A LITTLE
EATING MEALS: TOTAL
DRESSING REGULAR LOWER BODY CLOTHING: A LITTLE
SUGGESTED CMS G CODE MODIFIER MOBILITY: CK
HELP NEEDED FOR BATHING: A LITTLE
TOILETING: A LOT
STANDING UP FROM CHAIR USING ARMS: TOTAL
TOILETING: A LOT
STANDING UP FROM CHAIR USING ARMS: TOTAL
HELP NEEDED FOR BATHING: A LOT
SUGGESTED CMS G CODE MODIFIER MOBILITY: CM
MOVING FROM LYING ON BACK TO SITTING ON SIDE OF FLAT BED: A LITTLE
HELP NEEDED FOR BATHING: A LITTLE
WALKING IN HOSPITAL ROOM: A LITTLE
MOBILITY SCORE: 15
SUGGESTED CMS G CODE MODIFIER DAILY ACTIVITY: CK
SUGGESTED CMS G CODE MODIFIER MOBILITY: CI
SUGGESTED CMS G CODE MODIFIER MOBILITY: CK
SUGGESTED CMS G CODE MODIFIER DAILY ACTIVITY: CL
DRESSING REGULAR UPPER BODY CLOTHING: A LITTLE
STANDING UP FROM CHAIR USING ARMS: A LOT
WALKING IN HOSPITAL ROOM: A LITTLE
DRESSING REGULAR LOWER BODY CLOTHING: A LITTLE
HELP NEEDED FOR BATHING: TOTAL
TURNING FROM BACK TO SIDE WHILE IN FLAT BAD: A LITTLE
HELP NEEDED FOR BATHING: A LITTLE
CLIMB 3 TO 5 STEPS WITH RAILING: TOTAL
WALKING IN HOSPITAL ROOM: TOTAL
DRESSING REGULAR LOWER BODY CLOTHING: A LOT
HELP NEEDED FOR BATHING: A LOT
DAILY ACTIVITIY SCORE: 11
CLIMB 3 TO 5 STEPS WITH RAILING: TOTAL
MOVING TO AND FROM BED TO CHAIR: A LITTLE
TURNING FROM BACK TO SIDE WHILE IN FLAT BAD: A LOT
DAILY ACTIVITIY SCORE: 11
EATING MEALS: A LITTLE
SUGGESTED CMS G CODE MODIFIER MOBILITY: CN
DAILY ACTIVITIY SCORE: 15
WALKING IN HOSPITAL ROOM: A LOT
DAILY ACTIVITIY SCORE: 20
SUGGESTED CMS G CODE MODIFIER MOBILITY: CL
MOVING FROM LYING ON BACK TO SITTING ON SIDE OF FLAT BED: A LITTLE
DRESSING REGULAR LOWER BODY CLOTHING: A LITTLE
SUGGESTED CMS G CODE MODIFIER DAILY ACTIVITY: CI
MOVING FROM LYING ON BACK TO SITTING ON SIDE OF FLAT BED: UNABLE
HELP NEEDED FOR BATHING: A LITTLE
CLIMB 3 TO 5 STEPS WITH RAILING: TOTAL
MOBILITY SCORE: 17
MOVING TO AND FROM BED TO CHAIR: A LITTLE
STANDING UP FROM CHAIR USING ARMS: A LITTLE
PERSONAL GROOMING: A LITTLE
PERSONAL GROOMING: A LITTLE
DAILY ACTIVITIY SCORE: 15
EATING MEALS: A LITTLE
DRESSING REGULAR UPPER BODY CLOTHING: A LOT
TURNING FROM BACK TO SIDE WHILE IN FLAT BAD: UNABLE
PERSONAL GROOMING: A LITTLE
EATING MEALS: TOTAL
TOILETING: A LITTLE
MOVING FROM LYING ON BACK TO SITTING ON SIDE OF FLAT BED: UNABLE
MOVING FROM LYING ON BACK TO SITTING ON SIDE OF FLAT BED: UNABLE
MOBILITY SCORE: 16
HELP NEEDED FOR BATHING: A LITTLE
WALKING IN HOSPITAL ROOM: A LOT
MOVING FROM LYING ON BACK TO SITTING ON SIDE OF FLAT BED: UNABLE
DRESSING REGULAR UPPER BODY CLOTHING: A LITTLE
STANDING UP FROM CHAIR USING ARMS: A LOT
STANDING UP FROM CHAIR USING ARMS: A LITTLE
MOBILITY SCORE: 6
STANDING UP FROM CHAIR USING ARMS: TOTAL
MOVING TO AND FROM BED TO CHAIR: UNABLE
TOILETING: A LITTLE
EATING MEALS: A LITTLE
STANDING UP FROM CHAIR USING ARMS: A LOT
DRESSING REGULAR UPPER BODY CLOTHING: A LITTLE
MOVING TO AND FROM BED TO CHAIR: A LITTLE
SUGGESTED CMS G CODE MODIFIER DAILY ACTIVITY: CK
SUGGESTED CMS G CODE MODIFIER DAILY ACTIVITY: CK
HELP NEEDED FOR BATHING: A LOT
MOBILITY SCORE: 12
TURNING FROM BACK TO SIDE WHILE IN FLAT BAD: UNABLE
DRESSING REGULAR LOWER BODY CLOTHING: A LOT
SUGGESTED CMS G CODE MODIFIER MOBILITY: CK
MOVING FROM LYING ON BACK TO SITTING ON SIDE OF FLAT BED: A LOT
DAILY ACTIVITIY SCORE: 18
STANDING UP FROM CHAIR USING ARMS: A LITTLE
MOVING FROM LYING ON BACK TO SITTING ON SIDE OF FLAT BED: A LITTLE
CLIMB 3 TO 5 STEPS WITH RAILING: TOTAL
SUGGESTED CMS G CODE MODIFIER DAILY ACTIVITY: CN
PERSONAL GROOMING: A LITTLE
SUGGESTED CMS G CODE MODIFIER DAILY ACTIVITY: CL
MOVING TO AND FROM BED TO CHAIR: A LITTLE
TOILETING: A LITTLE
STANDING UP FROM CHAIR USING ARMS: A LITTLE
TURNING FROM BACK TO SIDE WHILE IN FLAT BAD: A LITTLE
DRESSING REGULAR UPPER BODY CLOTHING: A LITTLE
TOILETING: TOTAL
MOVING TO AND FROM BED TO CHAIR: A LITTLE
EATING MEALS: A LITTLE
DRESSING REGULAR UPPER BODY CLOTHING: TOTAL
SUGGESTED CMS G CODE MODIFIER MOBILITY: CL
DRESSING REGULAR LOWER BODY CLOTHING: A LITTLE
TOILETING: A LITTLE
MOVING FROM LYING ON BACK TO SITTING ON SIDE OF FLAT BED: A LITTLE
TURNING FROM BACK TO SIDE WHILE IN FLAT BAD: A LITTLE
MOBILITY SCORE: 6
TOILETING: A LITTLE
CLIMB 3 TO 5 STEPS WITH RAILING: A LITTLE
CLIMB 3 TO 5 STEPS WITH RAILING: TOTAL
SUGGESTED CMS G CODE MODIFIER DAILY ACTIVITY: CK
TURNING FROM BACK TO SIDE WHILE IN FLAT BAD: A LOT
TOILETING: A LITTLE
CLIMB 3 TO 5 STEPS WITH RAILING: TOTAL
PERSONAL GROOMING: A LITTLE
DAILY ACTIVITIY SCORE: 6
MOBILITY SCORE: 20
DRESSING REGULAR LOWER BODY CLOTHING: A LOT
SUGGESTED CMS G CODE MODIFIER DAILY ACTIVITY: CK
PERSONAL GROOMING: A LITTLE
HELP NEEDED FOR BATHING: A LOT
MOVING FROM LYING ON BACK TO SITTING ON SIDE OF FLAT BED: A LITTLE
MOBILITY SCORE: 17
WALKING IN HOSPITAL ROOM: A LITTLE
SUGGESTED CMS G CODE MODIFIER DAILY ACTIVITY: CK
MOBILITY SCORE: 12
SUGGESTED CMS G CODE MODIFIER DAILY ACTIVITY: CI
TURNING FROM BACK TO SIDE WHILE IN FLAT BAD: UNABLE
MOVING TO AND FROM BED TO CHAIR: A LITTLE
EATING MEALS: A LITTLE
SUGGESTED CMS G CODE MODIFIER MOBILITY: CK
MOVING TO AND FROM BED TO CHAIR: A LITTLE
MOVING FROM LYING ON BACK TO SITTING ON SIDE OF FLAT BED: UNABLE
DRESSING REGULAR LOWER BODY CLOTHING: A LOT
SUGGESTED CMS G CODE MODIFIER MOBILITY: CK
STANDING UP FROM CHAIR USING ARMS: A LITTLE
CLIMB 3 TO 5 STEPS WITH RAILING: TOTAL
MOBILITY SCORE: 23
DAILY ACTIVITIY SCORE: 23
CLIMB 3 TO 5 STEPS WITH RAILING: A LOT
DRESSING REGULAR LOWER BODY CLOTHING: A LITTLE
WALKING IN HOSPITAL ROOM: A LITTLE
TURNING FROM BACK TO SIDE WHILE IN FLAT BAD: A LITTLE
DRESSING REGULAR UPPER BODY CLOTHING: A LOT
MOVING FROM LYING ON BACK TO SITTING ON SIDE OF FLAT BED: A LOT
DRESSING REGULAR UPPER BODY CLOTHING: A LITTLE
STANDING UP FROM CHAIR USING ARMS: A LITTLE
MOBILITY SCORE: 14
CLIMB 3 TO 5 STEPS WITH RAILING: TOTAL
DRESSING REGULAR UPPER BODY CLOTHING: A LITTLE
WALKING IN HOSPITAL ROOM: A LITTLE
DRESSING REGULAR LOWER BODY CLOTHING: TOTAL
MOVING FROM LYING ON BACK TO SITTING ON SIDE OF FLAT BED: UNABLE
EATING MEALS: A LOT
EATING MEALS: A LITTLE
CLIMB 3 TO 5 STEPS WITH RAILING: TOTAL
CLIMB 3 TO 5 STEPS WITH RAILING: A LITTLE
DAILY ACTIVITIY SCORE: 18
SUGGESTED CMS G CODE MODIFIER DAILY ACTIVITY: CK
DRESSING REGULAR LOWER BODY CLOTHING: A LITTLE
HELP NEEDED FOR BATHING: A LOT
MOVING FROM LYING ON BACK TO SITTING ON SIDE OF FLAT BED: A LITTLE
EATING MEALS: TOTAL
DRESSING REGULAR LOWER BODY CLOTHING: A LITTLE
TOILETING: A LOT
SUGGESTED CMS G CODE MODIFIER MOBILITY: CM
PERSONAL GROOMING: A LITTLE
HELP NEEDED FOR BATHING: A LITTLE
MOBILITY SCORE: 7
DRESSING REGULAR LOWER BODY CLOTHING: TOTAL
SUGGESTED CMS G CODE MODIFIER DAILY ACTIVITY: CK
STANDING UP FROM CHAIR USING ARMS: A LOT
TURNING FROM BACK TO SIDE WHILE IN FLAT BAD: A LITTLE
DRESSING REGULAR LOWER BODY CLOTHING: A LOT
STANDING UP FROM CHAIR USING ARMS: A LITTLE
PERSONAL GROOMING: A LITTLE
DRESSING REGULAR LOWER BODY CLOTHING: A LITTLE
MOVING TO AND FROM BED TO CHAIR: A LOT
SUGGESTED CMS G CODE MODIFIER DAILY ACTIVITY: CJ
MOVING TO AND FROM BED TO CHAIR: A LITTLE
SUGGESTED CMS G CODE MODIFIER DAILY ACTIVITY: CL
SUGGESTED CMS G CODE MODIFIER MOBILITY: CL
MOBILITY SCORE: 17
WALKING IN HOSPITAL ROOM: TOTAL
MOVING FROM LYING ON BACK TO SITTING ON SIDE OF FLAT BED: A LITTLE
WALKING IN HOSPITAL ROOM: A LITTLE
WALKING IN HOSPITAL ROOM: TOTAL
PERSONAL GROOMING: TOTAL
PERSONAL GROOMING: A LITTLE
STANDING UP FROM CHAIR USING ARMS: A LOT
PERSONAL GROOMING: A LITTLE
TOILETING: A LITTLE
SUGGESTED CMS G CODE MODIFIER MOBILITY: CL
MOBILITY SCORE: 11
EATING MEALS: A LITTLE
WALKING IN HOSPITAL ROOM: A LITTLE
DRESSING REGULAR LOWER BODY CLOTHING: TOTAL
HELP NEEDED FOR BATHING: A LITTLE
TURNING FROM BACK TO SIDE WHILE IN FLAT BAD: A LITTLE
DAILY ACTIVITIY SCORE: 11
WALKING IN HOSPITAL ROOM: A LOT
WALKING IN HOSPITAL ROOM: TOTAL
SUGGESTED CMS G CODE MODIFIER MOBILITY: CK
DAILY ACTIVITIY SCORE: 20
DRESSING REGULAR UPPER BODY CLOTHING: A LOT
PERSONAL GROOMING: A LITTLE
STANDING UP FROM CHAIR USING ARMS: A LOT
CLIMB 3 TO 5 STEPS WITH RAILING: A LITTLE
TURNING FROM BACK TO SIDE WHILE IN FLAT BAD: A LITTLE
WALKING IN HOSPITAL ROOM: TOTAL
MOVING FROM LYING ON BACK TO SITTING ON SIDE OF FLAT BED: UNABLE
PERSONAL GROOMING: TOTAL
DRESSING REGULAR UPPER BODY CLOTHING: A LITTLE
DAILY ACTIVITIY SCORE: 16
SUGGESTED CMS G CODE MODIFIER MOBILITY: CL
HELP NEEDED FOR BATHING: A LOT
MOVING TO AND FROM BED TO CHAIR: A LOT
MOBILITY SCORE: 8
PERSONAL GROOMING: A LITTLE
MOVING TO AND FROM BED TO CHAIR: A LITTLE
WALKING IN HOSPITAL ROOM: A LOT
CLIMB 3 TO 5 STEPS WITH RAILING: A LOT
MOVING TO AND FROM BED TO CHAIR: A LITTLE
SUGGESTED CMS G CODE MODIFIER MOBILITY: CK
HELP NEEDED FOR BATHING: A LITTLE
MOBILITY SCORE: 13
TURNING FROM BACK TO SIDE WHILE IN FLAT BAD: A LITTLE
PERSONAL GROOMING: TOTAL
CLIMB 3 TO 5 STEPS WITH RAILING: A LITTLE
DAILY ACTIVITIY SCORE: 18
MOVING FROM LYING ON BACK TO SITTING ON SIDE OF FLAT BED: A LOT
SUGGESTED CMS G CODE MODIFIER MOBILITY: CK
WALKING IN HOSPITAL ROOM: A LITTLE
EATING MEALS: TOTAL
SUGGESTED CMS G CODE MODIFIER MOBILITY: CJ
CLIMB 3 TO 5 STEPS WITH RAILING: A LITTLE
MOBILITY SCORE: 16
SUGGESTED CMS G CODE MODIFIER DAILY ACTIVITY: CJ
DAILY ACTIVITIY SCORE: 22
SUGGESTED CMS G CODE MODIFIER DAILY ACTIVITY: CK
TOILETING: A LOT
DRESSING REGULAR UPPER BODY CLOTHING: A LITTLE
MOVING TO AND FROM BED TO CHAIR: A LITTLE
DAILY ACTIVITIY SCORE: 16
CLIMB 3 TO 5 STEPS WITH RAILING: TOTAL
TURNING FROM BACK TO SIDE WHILE IN FLAT BAD: A LITTLE
TOILETING: A LOT
TOILETING: A LOT
MOBILITY SCORE: 17
MOBILITY SCORE: 19
DRESSING REGULAR LOWER BODY CLOTHING: A LOT
CLIMB 3 TO 5 STEPS WITH RAILING: A LITTLE
WALKING IN HOSPITAL ROOM: A LITTLE
DRESSING REGULAR UPPER BODY CLOTHING: A LITTLE
SUGGESTED CMS G CODE MODIFIER MOBILITY: CN
HELP NEEDED FOR BATHING: A LOT
EATING MEALS: A LITTLE
STANDING UP FROM CHAIR USING ARMS: A LITTLE
TOILETING: A LITTLE
SUGGESTED CMS G CODE MODIFIER MOBILITY: CL
DRESSING REGULAR UPPER BODY CLOTHING: TOTAL
MOBILITY SCORE: 14
STANDING UP FROM CHAIR USING ARMS: A LITTLE
HELP NEEDED FOR BATHING: A LOT
DRESSING REGULAR UPPER BODY CLOTHING: A LITTLE
SUGGESTED CMS G CODE MODIFIER DAILY ACTIVITY: CJ
SUGGESTED CMS G CODE MODIFIER DAILY ACTIVITY: CL

## 2021-01-01 ASSESSMENT — GAIT ASSESSMENTS
DEVIATION: BRADYKINETIC;SHUFFLED GAIT;ATAXIC
GAIT LEVEL OF ASSIST: MINIMAL ASSIST
GAIT LEVEL OF ASSIST: UNABLE TO PARTICIPATE
ASSISTIVE DEVICE: FRONT WHEEL WALKER
GAIT LEVEL OF ASSIST: MINIMAL ASSIST
GAIT LEVEL OF ASSIST: MINIMAL ASSIST
DEVIATION: INCREASED BASE OF SUPPORT;BRADYKINETIC;SHUFFLED GAIT
DISTANCE (FEET): 5
DISTANCE (FEET): 45
DISTANCE (FEET): 25
DEVIATION: BRADYKINETIC
DEVIATION: INCREASED BASE OF SUPPORT;DECREASED HEEL STRIKE;DECREASED TOE OFF;OTHER (COMMENT)
ASSISTIVE DEVICE: FRONT WHEEL WALKER
GAIT LEVEL OF ASSIST: UNABLE TO PARTICIPATE
DISTANCE (FEET): 150
GAIT LEVEL OF ASSIST: SUPERVISED
DISTANCE (FEET): 20
ASSISTIVE DEVICE: FRONT WHEEL WALKER
GAIT LEVEL OF ASSIST: SUPERVISED
DEVIATION: BRADYKINETIC
ASSISTIVE DEVICE: FRONT WHEEL WALKER
ASSISTIVE DEVICE: FRONT WHEEL WALKER
GAIT LEVEL OF ASSIST: UNABLE TO PARTICIPATE
DISTANCE (FEET): 22
GAIT LEVEL OF ASSIST: MINIMAL ASSIST
DISTANCE (FEET): 5
DISTANCE (FEET): 75
ASSISTIVE DEVICE: FRONT WHEEL WALKER
GAIT LEVEL OF ASSIST: MINIMAL ASSIST
DISTANCE (FEET): 175
DISTANCE (FEET): 100
ASSISTIVE DEVICE: FRONT WHEEL WALKER
ASSISTIVE DEVICE: FRONT WHEEL WALKER
DEVIATION: BRADYKINETIC;SHUFFLED GAIT
DISTANCE (FEET): 10
GAIT LEVEL OF ASSIST: SUPERVISED

## 2021-01-01 ASSESSMENT — PULMONARY FUNCTION TESTS
EPAP_CMH2O: 10
FVC: .6
FVC: .6

## 2021-01-01 ASSESSMENT — FIBROSIS 4 INDEX
FIB4 SCORE: 2.06
FIB4 SCORE: 2.73
FIB4 SCORE: 2.06
FIB4 SCORE: 1.83
FIB4 SCORE: 2.44
FIB4 SCORE: 5.55
FIB4 SCORE: 1.79
FIB4 SCORE: 1.98
FIB4 SCORE: 3.12
FIB4 SCORE: 1.83
FIB4 SCORE: 1.36
FIB4 SCORE: 2.14
FIB4 SCORE: 2.11
FIB4 SCORE: 1.46
FIB4 SCORE: 38.59
FIB4 SCORE: 1.34
FIB4 SCORE: 1.46
FIB4 SCORE: 7.93
FIB4 SCORE: 2.61
FIB4 SCORE: 4.04
FIB4 SCORE: 11.65
FIB4 SCORE: 1.83
FIB4 SCORE: 2.69
FIB4 SCORE: 4.09
FIB4 SCORE: 2.06
FIB4 SCORE: 2.91
FIB4 SCORE: 7.93
FIB4 SCORE: 1.36
FIB4 SCORE: 11.18
FIB4 SCORE: 4.78
FIB4 SCORE: 2.39
FIB4 SCORE: 1.57
FIB4 SCORE: 2.06
FIB4 SCORE: 0.97
FIB4 SCORE: 1.55
FIB4 SCORE: 1.16
FIB4 SCORE: 5.09
FIB4 SCORE: 1.57

## 2021-01-01 ASSESSMENT — ACTIVITIES OF DAILY LIVING (ADL)
TOILETING: INDEPENDENT

## 2021-01-01 ASSESSMENT — CHA2DS2 SCORE
HYPERTENSION: YES
CHA2DS2 VASC SCORE: 5
VASCULAR DISEASE: NO
SEX: MALE
AGE 75 OR GREATER: NO
CHF OR LEFT VENTRICULAR DYSFUNCTION: NO
PRIOR STROKE OR TIA OR THROMBOEMBOLISM: YES
AGE 65 TO 74: YES
DIABETES: YES

## 2021-01-01 ASSESSMENT — PATIENT HEALTH QUESTIONNAIRE - PHQ9
1. LITTLE INTEREST OR PLEASURE IN DOING THINGS: NOT AT ALL
2. FEELING DOWN, DEPRESSED, IRRITABLE, OR HOPELESS: NOT AT ALL
SUM OF ALL RESPONSES TO PHQ9 QUESTIONS 1 AND 2: 0
2. FEELING DOWN, DEPRESSED, IRRITABLE, OR HOPELESS: NOT AT ALL
1. LITTLE INTEREST OR PLEASURE IN DOING THINGS: NOT AT ALL
SUM OF ALL RESPONSES TO PHQ9 QUESTIONS 1 AND 2: 0
SUM OF ALL RESPONSES TO PHQ9 QUESTIONS 1 AND 2: 0
1. LITTLE INTEREST OR PLEASURE IN DOING THINGS: NOT AT ALL
SUM OF ALL RESPONSES TO PHQ9 QUESTIONS 1 AND 2: 0
2. FEELING DOWN, DEPRESSED, IRRITABLE, OR HOPELESS: NOT AT ALL
SUM OF ALL RESPONSES TO PHQ9 QUESTIONS 1 AND 2: 0
2. FEELING DOWN, DEPRESSED, IRRITABLE, OR HOPELESS: NOT AT ALL
2. FEELING DOWN, DEPRESSED, IRRITABLE, OR HOPELESS: NOT AT ALL
1. LITTLE INTEREST OR PLEASURE IN DOING THINGS: NOT AT ALL
SUM OF ALL RESPONSES TO PHQ9 QUESTIONS 1 AND 2: 0
1. LITTLE INTEREST OR PLEASURE IN DOING THINGS: NOT AT ALL
1. LITTLE INTEREST OR PLEASURE IN DOING THINGS: NOT AT ALL
2. FEELING DOWN, DEPRESSED, IRRITABLE, OR HOPELESS: NOT AT ALL

## 2021-01-01 ASSESSMENT — COPD QUESTIONNAIRES
DO YOU EVER COUGH UP ANY MUCUS OR PHLEGM?: YES, A FEW DAYS A WEEK OR MONTH
COPD SCREENING SCORE: 5
HAVE YOU SMOKED AT LEAST 100 CIGARETTES IN YOUR ENTIRE LIFE: YES
DURING THE PAST 4 WEEKS HOW MUCH DID YOU FEEL SHORT OF BREATH: SOME OF THE TIME

## 2021-01-01 ASSESSMENT — PAIN SCALES - WONG BAKER: WONGBAKER_NUMERICALRESPONSE: HURTS A LITTLE MORE

## 2021-01-01 ASSESSMENT — PAIN SCALES - GENERAL: PAIN_LEVEL: 2

## 2021-01-04 NOTE — PROGRESS NOTES
Anticoagulation Summary  As of 2021    INR goal:  2.5-3.5   TTR:  57.0 % (1.3 y)   INR used for dosin.50 (2021)   Warfarin maintenance plan:  2 mg (2 mg x 1) every Fri; 5 mg (5 mg x 1) all other days   Weekly warfarin total:  32 mg   Plan last modified:  WILLIAM BobPPASQUALE (2020)   Next INR check:  2021   Priority:  Acute   Target end date:  Indefinite    Indications    H/O mitral valve and aortic valve replacement [Z95.2]  Paroxysmal atrial fibrillation (HCC) [I48.0]             Anticoagulation Episode Summary     INR check location:      Preferred lab:      Send INR reminders to:      Comments:  LARISA TREVINO      Anticoagulation Care Providers     Provider Role Specialty Phone number    Sumanth Yancey M.D. Referring Cardiology [phone]    [facility] Responsible  [phone]        Anticoagulation Patient Findings      HPI:  Morales Olivier seen in clinic today for follow up on anticoagulation therapy in the presence of AF, MVR, AVR.   Pt's INR was 7.7 on Thursday due to taking bactrim which he finished on Saturday. He is holding warfarin.   Currently has Larisa TREVINO.  Reports poor appetite.  No current symptoms of bleeding or thrombosis reported.    Pt on antiplatelet therapy - n/a.    A/P:   INR subtherapeutic. INR down from 7.7 after holding warfarin for 4 doses. Will have pt restart warfarin tonight. Instructed to take 10 mg tonight then resume his previous regimen.   BP recorded in vitals.    Follow up appointment on Thursday, 2021 - order sent to Larisa TREVINO.      Yelena LOYA

## 2021-01-08 NOTE — PROGRESS NOTES
OP Telephone Anticoagulation Service Note    Date: 1/8/2021      Anticoagulation Summary  As of 1/8/2021    INR goal:  2.5-3.5   TTR:  56.9 % (1.3 y)   INR used for dosing:  3.90 (1/8/2021)   Warfarin maintenance plan:  2 mg (2 mg x 1) every Fri; 5 mg (5 mg x 1) all other days   Weekly warfarin total:  32 mg   Plan last modified:  SHARMILA Bob (5/28/2020)   Next INR check:  1/12/2021   Priority:  Acute   Target end date:  Indefinite    Indications    H/O mitral valve and aortic valve replacement [Z95.2]  Paroxysmal atrial fibrillation (HCC) [I48.0]             Anticoagulation Episode Summary     INR check location:      Preferred lab:      Send INR reminders to:      Comments:  PARKER TREVINO      Anticoagulation Care Providers     Provider Role Specialty Phone number    Sumanth Yancey M.D. Referring Cardiology [phone]    [facility] Responsible  [phone]        Anticoagulation Patient Findings      INR SUPRA-therapeutic at 3.9.  Spoke w/ pt on phone.  Verified regimen w/ pt - Pt has been taking 5 mg daily.  Instructed pt to continue on with his currently regimen as he will be taking a decreased dose of 2 mg today. His elevated INR is likely a result of his bolus dose on Mon.  NO s/s bleeding reported per pt.  NO changes in diet reported per pt.  NO changes in medications reported per pt.  Check INR in 4 day(s).  Instructed pt to call clinic at [phone] if there are any questions.  Pt stated understanding.    Mich Moody, CbD

## 2021-01-13 NOTE — PROGRESS NOTES
OP Anticoagulation Service Note    Date: 2021    Anticoagulation Summary  As of 2021    INR goal:  2.5-3.5   TTR:  56.4 % (1.3 y)   INR used for dosin.90 (2021)   Warfarin maintenance plan:  2 mg (2 mg x 1) every Fri; 5 mg (5 mg x 1) all other days   Weekly warfarin total:  32 mg   Plan last modified:  Yelena Carreon A.P.NHasmukh (2020)   Next INR check:  1/15/2021   Priority:  Acute   Target end date:  Indefinite    Indications    H/O mitral valve and aortic valve replacement [Z95.2]  Paroxysmal atrial fibrillation (HCC) [I48.0]             Anticoagulation Episode Summary     INR check location:      Preferred lab:      Send INR reminders to:      Comments:  PARKER TREVINO [phone]      Anticoagulation Care Providers     Provider Role Specialty Phone number    Sumanth Yancey M.D. Referring Cardiology [phone]    [facility] Responsible  [phone]        Anticoagulation Patient Findings      This appointment was conducted over the phone.     HPI:   The reason for today's call is to prevent morbidity and mortality from a blood clot and/or stroke and to reduce the risk of bleeding while on a anticoagulant.     PCP:  Yelena Haney P.A.-C.  [address] [place] [phone]    Assessment:     • INR  supra-therapeutic.     Current Outpatient Medications:   •  warfarin, 2 mg, Oral, See Admin Instructions  •  warfarin, 5 mg, Oral, See Admin Instructions  •  colchicine, 0.6 mg, Oral, BID (Patient not taking: Reported on 2020)  •  benzonatate, 100 mg, Oral, TID PRN  •  furosemide, 20 mg, Oral, DAILY  •  hydrOXYzine HCl, 25 mg, Oral, TID PRN  •  cetirizine, 10 mg, Oral, QAM  •  lisinopril, 2.5 mg, Oral, Q EVENING  •  acetaminophen, 1,000 mg, Oral, Q6HRS PRN  •  tamsulosin, TAKE 1 CAPSULE BY MOUTH EVERY DAY  •  allopurinol, 100 mg, Oral, QAM  •  metoprolol SR, 50 mg, Oral, QAM  •  metFORMIN, 500 mg, Oral, BID WITH MEALS      Plan:     • Hold x 2 then continue the same  warfarin dose, as noted above.       Follow-up:     • Our protocol suggests we test in 3 days         Additional information discussed with patient:     • Asked patient to please call the anticoagulation clinic if they have any signs/symptoms of bleeding and/or thrombosis or any changes to diet or medications.      National recommendations regarding anticoagulation therapy:     The CHEST guidelines recommends frequent INR monitoring at regular intervals (a few days up to a max of 12 weeks) to ensure patients are on the proper dose of warfarin, and patients are not having any complications from therapy.  INRs can dramatically change over a short time period due to diet, medications, and medical conditions.       Oscar Worrell, PharmD, MS, BCACP, LCC  [facility]  Phone [phone] fax [phone]    This note was created using voice recognition software (Dragon). The accuracy of the dictation is limited by the abilities of the software. I have reviewed the note prior to signing, however some errors in grammar and context are still possible. If you have any questions related to this note please do not hesitate to contact our office.

## 2021-01-16 NOTE — PROGRESS NOTES
OP   Telephone Anticoagulation Service Note      Anticoagulation Summary  As of 1/15/2021    INR goal:  2.5-3.5   TTR:  56.3 % (1.3 y)   INR used for dosin.50 (1/15/2021)   Warfarin maintenance plan:  2 mg (2 mg x 1) every Fri; 5 mg (5 mg x 1) all other days   Weekly warfarin total:  32 mg   Plan last modified:  WILLIAM BobPPASQUALE (2020)   Next INR check:  2021   Priority:  Acute   Target end date:  Indefinite    Indications    H/O mitral valve and aortic valve replacement [Z95.2]  Paroxysmal atrial fibrillation (HCC) [I48.0]             Anticoagulation Episode Summary     INR check location:      Preferred lab:      Send INR reminders to:      Comments:  LARISA  [phone]      Anticoagulation Care Providers     Provider Role Specialty Phone number    Sumanth Yancey M.D. Referring Cardiology [phone]    [facility] Responsible  [phone]      _____________________________________________  Larisa  pt reports black bloody stool    Routing comment    _____________________________________________  Anticoagulation Patient Findings  Patient Findings     Positives:  Signs/symptoms of bleeding         Spoke with the patient on the phone today, reporting a therapeutic INR of 2.5.   Confirmed the current warfarin dosing regimen and patient compliance.  Patient's INR been labile and was just supratherapeutic on Tuesday. Patient instructed to HOLD warfarin x2 days, but held for 3 days.   Patient reports a poor appetite lately.   Patient denies any interval changes to medications. Patient did report to  nurse that he had black loose stools but it has since resolved. He would not describe his stool as bloody or black or sticky, but just dark in nature. Patient will be watchful for any return of this and told if it persists to seek medical attention.   Patient will take reduced regimen of warfarin 2mg TONIGHT AND Sun and 5mg on Sat and Mon. Patient will retest again on Tuesday (per  pt only time HH will return).   Orders sent to Larisa TREVINO.     Pattie Calderon  PharmD      Pattie VivarD

## 2021-01-20 NOTE — PROGRESS NOTES
OP Anticoagulation Service Note    Date: 2021    Anticoagulation Summary  As of 2021    INR goal:  2.5-3.5   TTR:  55.8 % (1.3 y)   INR used for dosin.20 (2021)   Warfarin maintenance plan:  5 mg (5 mg x 1) every Tue; 2.5 mg (5 mg x 0.5) all other days   Weekly warfarin total:  20 mg   Plan last modified:  Oscar Worrell, PharmD (2021)   Next INR check:  2021   Priority:  Acute   Target end date:  Indefinite    Indications    H/O mitral valve and aortic valve replacement [Z95.2]  Paroxysmal atrial fibrillation (HCC) [I48.0]             Anticoagulation Episode Summary     INR check location:      Preferred lab:      Send INR reminders to:      Comments:  PARKER  [phone]      Anticoagulation Care Providers     Provider Role Specialty Phone number    Sumanth Yancey M.D. Referring Cardiology [phone]    [facility] Responsible  [phone]        Anticoagulation Patient Findings      This appointment was conducted over the phone.     HPI:   The reason for today's call is to prevent morbidity and mortality from a blood clot and/or stroke and to reduce the risk of bleeding while on a anticoagulant.     PCP:  Yelena Haney P.A.-C.  [address] [place] [phone]    Assessment:     • INR  sub-therapeutic.       Current Outpatient Medications:   •  warfarin, 2 mg, Oral, See Admin Instructions  •  warfarin, 5 mg, Oral, See Admin Instructions  •  colchicine, 0.6 mg, Oral, BID (Patient not taking: Reported on 2020)  •  benzonatate, 100 mg, Oral, TID PRN  •  furosemide, 20 mg, Oral, DAILY  •  hydrOXYzine HCl, 25 mg, Oral, TID PRN  •  cetirizine, 10 mg, Oral, QAM  •  lisinopril, 2.5 mg, Oral, Q EVENING  •  acetaminophen, 1,000 mg, Oral, Q6HRS PRN  •  tamsulosin, TAKE 1 CAPSULE BY MOUTH EVERY DAY  •  allopurinol, 100 mg, Oral, QAM  •  metoprolol SR, 50 mg, Oral, QAM  •  metFORMIN, 500 mg, Oral, BID WITH MEALS      Plan:     • Increase weekly warfarin  dose as noted      Follow-up:     • Our protocol suggests we test in 3 days         Additional information discussed with patient:     • Asked patient to please call the anticoagulation clinic if they have any signs/symptoms of bleeding and/or thrombosis or any changes to diet or medications.      National recommendations regarding anticoagulation therapy:     The CHEST guidelines recommends frequent INR monitoring at regular intervals (a few days up to a max of 12 weeks) to ensure patients are on the proper dose of warfarin, and patients are not having any complications from therapy.  INRs can dramatically change over a short time period due to diet, medications, and medical conditions.       Oscar Worrell, PharmD, MS, BCACP, [facility]  Phone [phone] fax [phone]    This note was created using voice recognition software (Dragon). The accuracy of the dictation is limited by the abilities of the software. I have reviewed the note prior to signing, however some errors in grammar and context are still possible. If you have any questions related to this note please do not hesitate to contact our office.

## 2021-01-22 NOTE — PROGRESS NOTES
Anticoagulation Summary  As of 2021    INR goal:  2.5-3.5   TTR:  55.8 % (1.3 y)   INR used for dosin.20 (2021)   Warfarin maintenance plan:  2 mg (2 mg x 1) every Thu; 4 mg (2 mg x 2) every Wed; 5 mg (5 mg x 1) every Tue; 2.5 mg (5 mg x 0.5) all other days   Weekly warfarin total:  21 mg   Plan last modified:  Oscar Worrell, PharmD (2021)   Next INR check:     Priority:  Acute   Target end date:  Indefinite    Indications    H/O mitral valve and aortic valve replacement [Z95.2]  Paroxysmal atrial fibrillation (HCC) [I48.0]             Anticoagulation Episode Summary     INR check location:      Preferred lab:      Send INR reminders to:      Comments:  PARKER  [phone]      Anticoagulation Care Providers     Provider Role Specialty Phone number    Sumanth Yancey M.D. Referring Interventional Cardiology [phone]    [facility] Anticoagulation [facility] Responsible  [phone]        Anticoagulation Patient Findings          Spoke with Morales to report a supra therapeutic INR of 4.2 PT DID NOT TAKE AS DIRECTED.    Pt took 8mg on Wednesday and 5mg last night instead of as directed. Pt to HOLD warfarin x2 days, then take 2.5mg on Monday  to retest INR 2021    Brandi Howard, Clinical Pharmacist, CDE, CACP

## 2021-01-29 NOTE — PROGRESS NOTES
Anticoagulation Summary  As of 1/29/2021    INR goal:  2.5-3.5   TTR:  55.5 % (1.4 y)   INR used for dosing:  3.10 (1/29/2021)   Warfarin maintenance plan:  2 mg (2 mg x 1) every Thu; 4 mg (2 mg x 2) every Wed; 5 mg (5 mg x 1) every Tue; 2.5 mg (5 mg x 0.5) all other days   Weekly warfarin total:  21 mg   Plan last modified:  Brandi Howard (1/29/2021)   Next INR check:  2/5/2021   Priority:  Acute   Target end date:  Indefinite    Indications    H/O mitral valve and aortic valve replacement [Z95.2]  Paroxysmal atrial fibrillation (HCC) [I48.0]             Anticoagulation Episode Summary     INR check location:      Preferred lab:      Send INR reminders to:      Comments:  LARISA TREVINO [phone]      Anticoagulation Care Providers     Provider Role Specialty Phone number    Sumanth Yancey M.D. Referring Interventional Cardiology [phone]    [facility] Anticoagulation Services Responsible  [phone]        Anticoagulation Patient Findings          Spoke with Morales, to report a therapeutic INR of 3.1. Continue current dosing regimen.  Follow up in 1 weeks, to reduce the risk of adverse events related to this high risk medication, warfarin.    Brandi Howard, Clinical Pharmacist      Larisa TREVINO to retest INR 2/5/2021

## 2021-02-04 PROBLEM — I47.10 PAROXYSMAL SVT (SUPRAVENTRICULAR TACHYCARDIA) (HCC): Status: ACTIVE | Noted: 2021-01-01

## 2021-02-04 PROBLEM — L03.115 CELLULITIS OF RIGHT LOWER EXTREMITY: Status: ACTIVE | Noted: 2021-01-01

## 2021-02-04 PROBLEM — J96.01 ACUTE RESPIRATORY FAILURE WITH HYPOXIA (HCC): Status: ACTIVE | Noted: 2021-01-01

## 2021-02-05 PROBLEM — Z79.899 ENCOUNTER FOR MONITORING AMIODARONE THERAPY: Chronic | Status: ACTIVE | Noted: 2021-01-01

## 2021-02-05 PROBLEM — Z51.81 ENCOUNTER FOR MONITORING AMIODARONE THERAPY: Chronic | Status: ACTIVE | Noted: 2021-01-01

## 2021-02-05 PROBLEM — E87.20 LACTIC ACID ACIDOSIS: Status: ACTIVE | Noted: 2021-01-01

## 2021-02-05 NOTE — CONSULTS
Reason for Consult:  Asked by Dr Esequiel Colindres M.D. to see this patient with A. fib RVR  Patient's PCP: Yelena Haney P.A.-C.    CC:   Chief Complaint   Patient presents with   • Chest Pain   • Shortness of Breath   • Fall Less than 10 Feet     [facility]        HPI:  Morales Olivier is a 65-year-old man with history of atrial fibrillation on Coumadin, sick sinus syndrome status post pacemaker, mitral valve replacement (29-mm Perimount bioprosthetic valve), resection of left atrial tumor (thrombus) by Dr. Wright on 03/10/08 and redo mitral valve replacement (29 mm Medtronic Mosaic porcine valve) again by Dr. Wright on 3/08/17, hypertension, DVT, and diabetes who was brought in from EMS from independent living after he was found down on the ground for unknown amount of time.      On arrival to the ER, his heart rate was in the 190s, complaining of shortness of breath and pain in the right leg.  He was given adenosine without any improvement heart rate.  He was given IV diltiazem, with improvement in heart rate.  The patient was also started on IV fluids and IV antibiotics.    Of note, the patient was recently hospitalized 12/15/2020 to 12/24/2020 for leg pain, found to have left lower extremity cellulitis.  The patient was also short of breath with pulmonary edema noted on chest x-ray in started on Lasix, and discharged on Lasix 20 mg daily (his LVEF was grossly normal on echo in November 2020).  Prior to this, the patient was admitted in November 2020 for chest pain and dizziness.  Nuclear stress test was negative for ischemia, and echo as noted previously showed grossly normal LVEF with mild LVH, and mean transvalvular mitral gradient 6-8 mmHg, which was unchanged from prior.    The patient reports feeling unwell.  He reports shortness of breath and leg pain as well as chest pain.  He reports palpitations.  Denies any orthopnea or PND.  Reports leg swelling, especially the right leg.   He does not remember how long he was on the ground.  He said the last thing he remembered was being in bed.  He reports chills and fatigue.      Medications / Drug list prior to admission:  No current facility-administered medications on file prior to encounter.      Current Outpatient Medications on File Prior to Encounter   Medication Sig Dispense Refill   • warfarin (COUMADIN) 2 MG Tab Take 1 Tab by mouth see administration instructions. Takes 2 mg every Friday has 5 mg tablets to take all other days (Patient taking differently: Take 2 mg by mouth every Friday.) 8 Tab 0   • warfarin (COUMADIN) 5 MG Tab Take 1 Tab by mouth see administration instructions. Takes Monday, Tuesday, Wednesday, Thursday, Saturday, and Sunday, has 2 mg tablets to take on Fridays (Patient taking differently: Take 5 mg by mouth see administration instructions. Takes Monday, Tuesday, Wednesday, Thursday, Saturday, and Sunday) 24 Tab 0   • colchicine (COLCRYS) 0.6 MG Tab Take 1 Tab by mouth 2 Times a Day. (Patient taking differently: Take 0.6 mg by mouth 1 time a day as needed (Gout).) 14 Tab 0   • benzonatate (TESSALON) 100 MG Cap Take 1 Cap by mouth 3 times a day as needed for Cough. (Patient not taking: Reported on 2/4/2021) 60 Cap 0   • furosemide (LASIX) 20 MG Tab Take 1 Tab by mouth every day. 14 Tab 0   • hydrOXYzine HCl (ATARAX) 25 MG Tab Take 1 Tab by mouth 3 times a day as needed for Itching. 30 Tab 0   • cetirizine (ZYRTEC) 10 MG Tab Take 10 mg by mouth every morning.     • lisinopril (PRINIVIL) 2.5 MG Tab Take 2.5 mg by mouth every evening.     • acetaminophen (TYLENOL) 500 MG Tab Take 1,000 mg by mouth every 6 hours as needed for Mild Pain.     • tamsulosin (FLOMAX) 0.4 MG capsule TAKE 1 CAPSULE BY MOUTH EVERY DAY 30 Cap 0   • allopurinol (ZYLOPRIM) 100 MG Tab Take 100 mg by mouth every morning.     • metoprolol SR (TOPROL XL) 50 MG TABLET SR 24 HR Take 50 mg by mouth every morning.     • metFORMIN (GLUCOPHAGE) 500 MG Tab Take  500 mg by mouth 2 times a day, with meals.         Current list of administered Medications:    Current Facility-Administered Medications:   •  vancomycin (VANCOCIN) 2,750 mg in  mL IVPB, 25 mg/kg, Intravenous, Once, Esequiel Colindres M.D., Last Rate: 166.7 mL/hr at 02/04/21 1831, 2,750 mg at 02/04/21 1831  •  morphine (pf) 4 mg/mL injection 4 mg, 4 mg, Intravenous, Q30 MIN PRN, Esequiel Colindres M.D., 4 mg at 02/04/21 1809  •  Respiratory Therapy Consult, , Nebulization, Continuous RT, Gerald Williamson M.D.  •  ipratropium-albuterol (DUONEB) nebulizer solution, 3 mL, Nebulization, Q2HRS PRN (RT), Gerald Williamson M.D.  •  [START ON 2/5/2021] famotidine (PEPCID) tablet 20 mg, 20 mg, Enteral Tube, Q12HRS **OR** [START ON 2/5/2021] famotidine (PEPCID) injection 20 mg, 20 mg, Intravenous, Q12HRS, Gerald Williamson M.D.  •  [START ON 2/5/2021] senna-docusate (PERICOLACE or SENOKOT S) 8.6-50 MG per tablet 2 Tab, 2 Tab, Enteral Tube, BID **AND** polyethylene glycol/lytes (MIRALAX) PACKET 1 Packet, 1 Packet, Enteral Tube, QDAY PRN **AND** magnesium hydroxide (MILK OF MAGNESIA) suspension 30 mL, 30 mL, Enteral Tube, QDAY PRN **AND** bisacodyl (DULCOLAX) suppository 10 mg, 10 mg, Rectal, QDAY PRN, Gerald Williamson M.D.  •  MD Alert...ICU Electrolyte Replacement per Pharmacy, , Other, PHARMACY TO DOSE, Gerald Williamson M.D.  •  lidocaine (XYLOCAINE) 1 % injection 1-2 mL, 1-2 mL, Tracheal Tube, Q30 MIN PRN, Gerald Williamson M.D.  •  ipratropium-albuterol (DUONEB) nebulizer solution, 3 mL, Nebulization, Q4HRS (RT), Gerald Williamson M.D.  •  lactated ringers infusion (BOLUS): BMI greater than 30, 30 mL/kg (Ideal), Intravenous, Once PRN, Gerald Williamson M.D.  •  lactated ringers infusion (BOLUS), 500 mL, Intravenous, Once PRN, Gerald Williamson M.D.  •  heparin infusion 25,000 units in 500 mL 0.45% NACL, 0-30 Units/kg/hr (Adjusted), Intravenous, Continuous, Gerald Williamson M.D.  •  heparin injection 3,600 Units, 40 Units/kg (Adjusted), Intravenous, PRN, Gerald Williamson,  M.D.  •  DILTIAZem (CARDIZEM) 100 mg in 5% dextrose 100 mL Infusion, 0-15 mg/hr, Intravenous, Continuous, Gerald Williamson M.D.  •  NS infusion, , Intravenous, Continuous, John Angel Luis, D.O.  •  acetaminophen (Tylenol) tablet 650 mg, 650 mg, Enteral Tube, Q6HRS PRN, John Angel Luis, D.O.  •  labetalol (NORMODYNE/TRANDATE) injection 10 mg, 10 mg, Intravenous, Q4HRS PRN, John Angel Luis, D.O.  •  ondansetron (ZOFRAN) syringe/vial injection 4 mg, 4 mg, Intravenous, Q4HRS PRN, John Angel Luis, D.O.  •  ondansetron (ZOFRAN ODT) dispertab 4 mg, 4 mg, Oral, Q4HRS PRN, John Angel Luis, D.O.  •  fentaNYL (SUBLIMAZE) injection 100 mcg, 100 mcg, Intravenous, Q15 MIN PRN **AND** fentaNYL (SUBLIMAZE) injection 200 mcg, 200 mcg, Intravenous, Q15 MIN PRN **AND** fentaNYL (SUBLIMAZE) 50 mcg/mL in 50mL (Continuous Infusion), , Intravenous, Continuous **AND** dexmedetomidine (PRECEDEX) 400 mcg/100mL NS premix infusion, 0-1.5 mcg/kg/hr, Intravenous, Continuous, John Angel Luis, D.O.  •  piperacillin-tazobactam (ZOSYN) 3.375 g in  mL IVPB, 3.375 g, Intravenous, Once **AND** [START ON 2/5/2021] piperacillin-tazobactam (ZOSYN) 3.375 g in  mL IVPB, 3.375 g, Intravenous, Q8HRS, John Angel Luis, D.O.    Past Medical History:   Diagnosis Date   • Atrial fibrillation (HCC)    • Back pain    • Bronchitis    • CAD (coronary artery disease)    • COPD (chronic obstructive pulmonary disease) (HCC)    • Gout    • Heart valve disease     mitral valve replacement (bovine)   • Hypertension    • Kidney stone    • Obesity    • Open wound 9/2/2009   • Pacemaker    • Personal history of venous thrombosis and embolism 2009    DVT right leg   • PVC (premature ventricular contraction) 4/13/2019   • Renal disorder     kidney stones   • Type II or unspecified type diabetes mellitus without mention of complication, not stated as uncontrolled     DM type II- diet controlled       Past Surgical History:   Procedure Laterality Date   • MITRAL VALVE REPLACE   3/8/2017    Procedure: MITRAL VALVE REPLACE-REDO;  Surgeon: Cornelia Wright M.D.;  Location: SURGERY [facility];  Service:    • KD  3/8/2017    Procedure: KD;  Surgeon: Cornelia Wright M.D.;  Location: SURGERY [facility];  Service:    • IRRIGATION & DEBRIDEMENT GENERAL  2009    Performed by MICHEL URBINA at SURGERY [facility]   • WIDE EXCISION  2009    Performed by MICHEL URBINA at SURGERY [facility]   • ANGIOGRAM  2009    Performed by MICHEL URBINA at SURGERY [facility]   • CATH REMOVAL  2009    Performed by MICHEL URBINA at SURGERY [facility]   • THROMBECTOMY  2009    Performed by MICHEL URBINA at [facility]   • EMBOLECTOMY  2009    Performed by MICHEL URBINA at SURGERY [facility]   • ANGIOGRAM  7/3/2009    Performed by MORGAN WARD at SURGERY [facility]   • MITRAL VALVE REPLACE  3/14/08    Performed by CORNELIA WRIGHT at SURGERY [facility]   • MAZE PROCEDURE  3/14/08    Performed by CORNELIA WRIGHT at SURGERY [facility]   • OTHER CARDIAC SURGERY      mitral valve replacement   • AORTIC VALVE REPLACEMENT     • OTHER ABDOMINAL SURGERY      imbulica repair        Family History   Problem Relation Age of Onset   • Diabetes Mother    • Lung Disease Father    • Heart Disease Neg Hx      Patient family history was personally reviewed, no pertinent family history to current presentation    Social History     Tobacco Use   • Smoking status: Former Smoker     Packs/day: 2.50     Years: 9.00     Pack years: 22.50     Types: Cigarettes     Quit date: 1981     Years since quittin.1   • Smokeless tobacco: Never Used   Substance Use Topics   • Alcohol use: No   • Drug use: Yes     Types: Marijuana       ALLERGIES:  No Known Allergies    Review of systems:  A complete review of symptoms was reviewed with patient. This is reviewed in H&P and PMH. ALL OTHERS reviewed and negative    Physical  "exam:  Patient Vitals for the past 24 hrs:   BP Temp Temp src Pulse Resp SpO2 Height Weight   21 1730 (!) 94/66 -- -- (!) 110 (!) 35 100 % -- --   21 1724 148/101 -- -- (!) 120 (!) 36 97 % -- --   21 1723 -- -- -- 100 (!) 42 96 % -- --   21 172 -- -- -- (!) 167 (!) 42 100 % -- --   21 172 -- -- -- (!) 184 (!) 39 88 % -- --   21 1720 131/105 -- -- (!) 183 (!) 39 92 % -- --   21 1644 118/66 -- -- (!) 193 (!) 39 89 % -- --   21 1623 150/109 -- -- (!) 189 (!) 40 (!) 87 % -- --   21 1617 -- 36.7 °C (98 °F) Tympanic (!) 189 (!) 42 (!) 87 % -- --   21 1610 -- -- -- -- -- -- 1.778 m (5' 10\") 113 kg (250 lb)   21 1609 146/97 -- -- -- -- -- -- --     General: No acute distress.   EYES: no jaundice  HEENT: OP clear   Neck: No bruits No JVD but difficult to assess due to her body habitus.   CVS: Tachycardic rate, irregular rhythm. S1 + S2. No M/R/G  Resp: Decreased breath sounds bilaterally at the bases, worse on right.  Abdomen: Soft, NT, ND  Skin: Grossly nothing acute no obvious rashes.  Neurological: Alert, Moves all extremities, no cranial nerve defects on limited exam  Extremities: No cyanosis.  Right leg red and swollen.  Left leg trace edema.  Extremities warm with good pulses.      Data:  Laboratory studies personally reviewed by me:  Recent Results (from the past 24 hour(s))   EKG    Collection Time: 02/04/21  4:01 PM   Result Value Ref Range    Report       [facility]    Test Date:  2021  Pt Name:    AILYN MCINTOSH                 Department: ER  MRN:        [id number]                      Room:        01  Gender:     Male                         Technician: [id number]  :        1955                   Requested By:ER TRIAGE PROTOCOL  Order #:    [id number]                    Reading MD:    Measurements  Intervals                                Axis  Rate:       189                          P:          0  ME:  "                                     QRS:        97  QRSD:       84                           T:          -56  QT:         264  QTc:        469    Interpretive Statements  SUPRAVENTRICULAR TACHYCARDIA  RIGHT AXIS DEVIATION  LOW VOLTAGE IN FRONTAL LEADS  REPOLARIZATION ABNORMALITY, PROB RATE RELATED  Compared to ECG 12/15/2020 21:50:54  Right-axis deviation now present  Low QRS voltage now present  Atrial fibrillation no longer present  Aberrant conduction of supraventricular beat(s) no longer  present     Lactic acid (lactate)    Collection Time: 02/04/21  4:15 PM   Result Value Ref Range    Lactic Acid 3.7 (H) 0.5 - 2.0 mmol/L   CBC WITH DIFFERENTIAL    Collection Time: 02/04/21  4:15 PM   Result Value Ref Range    WBC 28.8 (H) 4.8 - 10.8 K/uL    RBC 4.45 (L) 4.70 - 6.10 M/uL    Hemoglobin 14.4 14.0 - 18.0 g/dL    Hematocrit 43.2 42.0 - 52.0 %    MCV 97.1 81.4 - 97.8 fL    MCH 32.4 27.0 - 33.0 pg    MCHC 33.3 (L) 33.7 - 35.3 g/dL    RDW 62.2 (H) 35.9 - 50.0 fL    Platelet Count 175 164 - 446 K/uL    MPV 10.8 9.0 - 12.9 fL    Neutrophils-Polys 92.70 (H) 44.00 - 72.00 %    Lymphocytes 2.10 (L) 22.00 - 41.00 %    Monocytes 3.10 0.00 - 13.40 %    Eosinophils 0.40 0.00 - 6.90 %    Basophils 0.50 0.00 - 1.80 %    Immature Granulocytes 1.20 (H) 0.00 - 0.90 %    Nucleated RBC 0.00 /100 WBC    Neutrophils (Absolute) 26.70 (H) 1.82 - 7.42 K/uL    Lymphs (Absolute) 0.59 (L) 1.00 - 4.80 K/uL    Monos (Absolute) 0.89 (H) 0.00 - 0.85 K/uL    Eos (Absolute) 0.11 0.00 - 0.51 K/uL    Baso (Absolute) 0.15 (H) 0.00 - 0.12 K/uL    Immature Granulocytes (abs) 0.34 (H) 0.00 - 0.11 K/uL    NRBC (Absolute) 0.00 K/uL   COMP METABOLIC PANEL    Collection Time: 02/04/21  4:15 PM   Result Value Ref Range    Sodium 137 135 - 145 mmol/L    Potassium 5.3 3.6 - 5.5 mmol/L    Chloride 102 96 - 112 mmol/L    Co2 19 (L) 20 - 33 mmol/L    Anion Gap 16.0 7.0 - 16.0    Glucose 209 (H) 65 - 99 mg/dL    Bun 35 (H) 8 - 22 mg/dL    Creatinine 1.54 (H) 0.50  - 1.40 mg/dL    Calcium 8.5 8.5 - 10.5 mg/dL    AST(SGOT) 117 (H) 12 - 45 U/L    ALT(SGPT) 31 2 - 50 U/L    Alkaline Phosphatase 69 30 - 99 U/L    Total Bilirubin 1.0 0.1 - 1.5 mg/dL    Albumin 3.5 3.2 - 4.9 g/dL    Total Protein 7.1 6.0 - 8.2 g/dL    Globulin 3.6 (H) 1.9 - 3.5 g/dL    A-G Ratio 1.0 g/dL   PT/INR    Collection Time: 02/04/21  4:15 PM   Result Value Ref Range    PT 19.9 (H) 12.0 - 14.6 sec    INR 1.64 (H) 0.87 - 1.13   PTT    Collection Time: 02/04/21  4:15 PM   Result Value Ref Range    APTT 40.2 (H) 24.7 - 36.0 sec   TROPONIN    Collection Time: 02/04/21  4:15 PM   Result Value Ref Range    Troponin T 20 (H) 6 - 19 ng/L   proBrain Natriuretic Peptide, NT    Collection Time: 02/04/21  4:15 PM   Result Value Ref Range    NT-proBNP 1818 (H) 0 - 125 pg/mL   Heparin Xa (Unfractionated)    Collection Time: 02/04/21  4:15 PM   Result Value Ref Range    Heparin Xa (UFH) <0.10 IU/mL   ESTIMATED GFR    Collection Time: 02/04/21  4:15 PM   Result Value Ref Range    GFR If  55 (A) >60 mL/min/1.73 m 2    GFR If Non African American 45 (A) >60 mL/min/1.73 m 2   COV-2, FLU A/B, AND RSV BY PCR (2-4 HOURS [employer]): Collect NP swab in VTM    Collection Time: 02/04/21  5:10 PM    Specimen: Respirate   Result Value Ref Range    Influenza virus A RNA Negative Negative    Influenza virus B, PCR Negative Negative    RSV, PCR Negative Negative    SARS-CoV-2 by PCR NotDetected     SARS-CoV-2 Source NP Swab    ISTAT ARTERIAL BLOOD GAS    Collection Time: 02/04/21  7:50 PM   Result Value Ref Range    Ph 7.336 (L) 7.400 - 7.500    Pco2 40.9 (H) 26.0 - 37.0 mmHg    Po2 308 (H) 64 - 87 mmHg    Tco2 23 20 - 33 mmol/L    S02 100 (H) 93 - 99 %    Hco3 21.9 17.0 - 25.0 mmol/L    BE -4 -4 - 3 mmol/L    Body Temp 100.4 F degrees    O2 Therapy 100 %    iPF Ratio 308     Ph Temp Huyen 7.322 (L) 7.400 - 7.500    Pco2 Temp Co 42.7 (H) 26.0 - 37.0 mmHg    Po2 Temp Cor 313 (H) 64 - 87 mmHg    Specimen Arterial     Action  Range Triggered NO     Inst. Qualified Patient YES        Imaging:  DX-CHEST-PORTABLE (1 VIEW)   Final Result      Enlarged cardiac silhouette status post cardiac surgery with small right-sided pleural effusion and vascular congestion.      EC-ECHOCARDIOGRAM COMPLETE W/O CONT    (Results Pending)   DX-CHEST-PORTABLE (1 VIEW)    (Results Pending)   DX-CHEST-PORTABLE (1 VIEW)    (Results Pending)   DX-CHEST-PORTABLE (1 VIEW)    (Results Pending)   DX-CHEST-PORTABLE (1 VIEW)    (Results Pending)         EKG February 4, 2021: personally reviewed by me Supraventricular tachycardia, low voltage in frontal leads.  Rate 189    Echocardiogram November 21, 2020  CONCLUSIONS  Grossly normal left ventricular systolic function. Left ventricular   ejection fraction is visually estimated to be 55%. Possible abnormal   apical wall motion which was previously seen (would need contrast to   definitively assess apical wall motion).   Mild concentric left ventricular hypertrophy.   Normal right ventricular size and systolic function.   Severely dilated left atrium.   Known mitral valve bioprosthesis. Mean transvalvular gradient is 6-8   mmHg at a heart rate of 105 BPM. No mitral regurgitation.  Compared to the images of the study done on 09/26/2019, there has been   no significant change.     Echocardiogram February 4, 2021  CONCLUSIONS  Mildly depressed left ventricular function.  Left ventricular ejection   fraction is visually estimated to be 45%, likely affected by rapid   heart rate.   Endocardial borders not well visualized to accurate assess wall motion   abnormalities. Recommend repeating limited study with contrast.  The right ventricle was grossly normal in size and function.  Severely dilated left atrium.  Known mitral valve bioprosthesis which is functioning normally,   transvalvular gradient is 6.5 mmHg @.  Moderate tricuspid regurgitation.   Pleural effusion noted.  Compared to the study done on 11/20/2020, LVEF is  measured lower,   likely affected by rapid heart rate. Otherwise, no significant changes.       Nuclear stress test November 21, 2020  NUCLEAR IMAGING INTERPRETATION   There is mild global hypokinesis.   No evidence of infarct or reversible ischemia.   ECG INTERPRETATION   Nondiagnostic ECG portion of a Regadenoson nuclear stress test.    All pertinent features of laboratory and imaging reviewed including primary images where applicable      Active Problems:    Atrial fibrillation with RVR (HCC) POA: Unknown    Cellulitis of right lower extremity POA: Yes    Acute respiratory failure with hypoxia (HCC) POA: Yes    Paroxysmal SVT (supraventricular tachycardia) ([facility]) POA: Unknown  Resolved Problems:    * No resolved hospital problems. *      Assessment / Plan:    Paroxysmal SVT  A. Fib/flutter RVR  History of mitral valve replacement  Sepsis  Trigger for A. fib RVR unclear.  Likely multifactorial due to infection and volume overload.  LVEF on echocardiogram November 2020 was grossly normal, with mild LVH.  Echocardiogram on February 4, 2021 with mildly depressed EF, likely contributed by rapid heart rate.  -INR subtherapeutic today, recommend heparin drip while INR gets back to therapeutic range  -Initially on diltiazem drip for A. fib RVR. Recommend IV digoxin.  If becomes hypotensive and remains in rapid heart rate, consider amiodarone gtt (although this is not ideal as the patient's INR has been subtherapeutic, will use if hypotensive)  -Recommend Lasix IV 40 mg x 1 (patient takes home Lasix 20 mg daily) if BP tolerates.  Will reassess volume after this dose.  -Treat infection as per primary team    I personally discussed his case with  Dr Esequiel Colindres M.D.    We will continue to follow.  It is my pleasure to participate in the care of Mr. Olivier.  Please do not hesitate to contact me with questions or concerns.    Yogesh Galindo MD   Cardiologist [facility]    2/4/2021    Please  note that this dictation was created using voice recognition software. There may be errors I did not discover before finalizing the note.

## 2021-02-05 NOTE — ASSESSMENT & PLAN NOTE
Resolved  Likely combination of sepsis, A. fib RVR  Now saturating 93 to 95% on room air  Incentive spirometer

## 2021-02-05 NOTE — CONSULTS
Critical Care Consultation    Date of consult: 2/4/2021    Referring Physician  Gerald Williamson M.D.    Reason for Consultation  Intubated for respiratory distress.    History of Presenting Illness  65 y.o. male who presented 2/4/2021 after being found down after a fall.  Pt complained of chest pain and SOB.  Pt found to be in a fib with RVR and had right leg cellulitis.  Pt was working hard to breathe and ER doctor thought it was beneficial to intubate pt prior to respiratory failure.    Code Status  Full Code    Review of Systems  Review of Systems   Constitutional: Positive for chills and malaise/fatigue.   Respiratory: Positive for cough and shortness of breath.    Cardiovascular: Positive for palpitations and leg swelling.   Gastrointestinal: Negative for abdominal pain.   Musculoskeletal:        Right leg pain.   Skin: Positive for rash.   Neurological: Positive for weakness.       Past Medical History   has a past medical history of Atrial fibrillation (HCC), Back pain, Bronchitis, CAD (coronary artery disease), COPD (chronic obstructive pulmonary disease) (HCC), Gout, Heart valve disease, Hypertension, Kidney stone, Obesity, Open wound (9/2/2009), Pacemaker, Personal history of venous thrombosis and embolism (2009), PVC (premature ventricular contraction) (4/13/2019), Renal disorder, and Type II or unspecified type diabetes mellitus without mention of complication, not stated as uncontrolled.    Surgical History   has a past surgical history that includes mitral valve replace (3/14/08); maze procedure (3/14/08); angiogram (7/3/2009); angiogram (7/4/2009); cath removal (7/4/2009); thrombectomy (7/4/2009); embolectomy (7/4/2009); irrigation & debridement general (7/20/2009); wide excision (7/20/2009); other cardiac surgery (2008); other abdominal surgery; mitral valve replace (3/8/2017); lopez (3/8/2017); and aortic valve replacement.    Family History  family history includes Diabetes in his mother; Lung Disease  in his father.    Social History   reports that he quit smoking about 40 years ago. His smoking use included cigarettes. He has a 22.50 pack-year smoking history. He has never used smokeless tobacco. He reports current drug use. Drug: Marijuana. He reports that he does not drink alcohol.    Medications  Home Medications     Reviewed by Irma Smith PhT (Pharmacy [facility]) on 02/04/21 at 1808  Med List Status: Complete   Medication Last Dose Status   acetaminophen (TYLENOL) 500 MG Tab 1/31/2021 Active   allopurinol (ZYLOPRIM) 100 MG Tab 1/31/2021 Active   benzonatate (TESSALON) 100 MG Cap Not Taking Active   cetirizine (ZYRTEC) 10 MG Tab 1/31/2021 Active   colchicine (COLCRYS) 0.6 MG Tab <14 days Active   furosemide (LASIX) 20 MG Tab 1/31/2021 Active   hydrOXYzine HCl (ATARAX) 25 MG Tab 1/30/2021 Active   lisinopril (PRINIVIL) 2.5 MG Tab 1/30/2021 Active   metFORMIN (GLUCOPHAGE) 500 MG Tab 1/30/2021 Active   metoprolol SR (TOPROL XL) 50 MG TABLET SR 24 HR 1/31/2021 Active   tamsulosin (FLOMAX) 0.4 MG capsule 1/31/2021 Active   warfarin (COUMADIN) 2 MG Tab 1/29/2021 Active   warfarin (COUMADIN) 5 MG Tab 1/30/2021 Active              Current Facility-Administered Medications   Medication Dose Route Frequency Provider Last Rate Last Admin   • vancomycin (VANCOCIN) 2,750 mg in  mL IVPB  25 mg/kg Intravenous Once Esequiel Colindres M.D. 166.7 mL/hr at 02/04/21 1831 2,750 mg at 02/04/21 1831   • morphine (pf) 4 mg/mL injection 4 mg  4 mg Intravenous Q30 MIN PRN Esequiel Colindres M.D.   4 mg at 02/04/21 1809   • acetaminophen (Tylenol) tablet 650 mg  650 mg Oral Q6HRS PRN Gerald Williamson M.D.       • Respiratory Therapy Consult   Nebulization Continuous RT Gerald Williamson M.D.       • ipratropium-albuterol (DUONEB) nebulizer solution  3 mL Nebulization Q2HRS PRN (RT) Gerald Williamson M.D.       • [START ON 2/5/2021] famotidine (PEPCID) tablet 20 mg  20 mg Enteral Tube Q12HRS Gerald Williamson M.D.        Or   • [START ON  2/5/2021] famotidine (PEPCID) injection 20 mg  20 mg Intravenous Q12HRS Gerald Williamson M.D.       • [START ON 2/5/2021] senna-docusate (PERICOLACE or SENOKOT S) 8.6-50 MG per tablet 2 Tab  2 Tab Enteral Tube BID Gerald Williamson M.D.        And   • polyethylene glycol/lytes (MIRALAX) PACKET 1 Packet  1 Packet Enteral Tube QDAY PRN Gerald Williamson M.D.        And   • magnesium hydroxide (MILK OF MAGNESIA) suspension 30 mL  30 mL Enteral Tube QDAY PRN Gerald Williamson M.D.        And   • bisacodyl (DULCOLAX) suppository 10 mg  10 mg Rectal QDAY PRN Gerald Williamson M.D.       • MD Alert...ICU Electrolyte Replacement per Pharmacy   Other PHARMACY TO DOSE Gerald Williamson M.D.       • lidocaine (XYLOCAINE) 1 % injection 1-2 mL  1-2 mL Tracheal Tube Q30 MIN PRN Gerald Williamson M.D.       • fentaNYL (SUBLIMAZE) injection 50 mcg  50 mcg Intravenous Q15 MIN PRN Gerald Williamson M.D.        And   • fentaNYL (SUBLIMAZE) injection 100 mcg  100 mcg Intravenous Q15 MIN PRN Gerald Williamson M.D.        And   • fentaNYL (SUBLIMAZE) 50 mcg/mL in 50mL (Continuous Infusion)   Intravenous Continuous Gerald Williamson M.D.        And   • propofol (DIPRIVAN) injection  0-40 mcg/kg/min Intravenous Continuous Gerald Williamson M.D.       • ipratropium-albuterol (DUONEB) nebulizer solution  3 mL Nebulization Q4HRS (RT) Gerald Williamson M.D.       • ampicillin/sulbactam (UNASYN) 3 g in  mL IVPB  3 g Intravenous Q6HRS Gerald Williamson M.D.       • lactated ringers infusion (BOLUS): BMI greater than 30  30 mL/kg (Ideal) Intravenous Once PRN Gerald Williamson M.D.       • lactated ringers infusion (BOLUS)  500 mL Intravenous Once PRN Gerald Williamson M.D.       • heparin infusion 25,000 units in 500 mL 0.45% NACL  0-30 Units/kg/hr (Adjusted) Intravenous Continuous Gerald Williamson M.D.       • heparin injection 3,600 Units  40 Units/kg (Adjusted) Intravenous PRN Gerald Williamson M.D.       • DILTIAZem (CARDIZEM) 100 mg in 5% dextrose 100 mL Infusion  0-15 mg/hr Intravenous Continuous Gerald Williamson M.D.        • senna-docusate (PERICOLACE or SENOKOT S) 8.6-50 MG per tablet 2 Tab  2 Tab Oral BID John Hein, D.O.        And   • polyethylene glycol/lytes (MIRALAX) PACKET 1 Packet  1 Packet Oral QDAY PRN John Hein, D.O.        And   • magnesium hydroxide (MILK OF MAGNESIA) suspension 30 mL  30 mL Oral QDAY PRN John Hein D.O.        And   • bisacodyl (DULCOLAX) suppository 10 mg  10 mg Rectal QDAY PRN John Hein, D.O.       • NS infusion   Intravenous Continuous John Hein D.O.       • [START ON 2/5/2021] enoxaparin (LOVENOX) inj 40 mg  40 mg Subcutaneous DAILY John Hein D.O.       • acetaminophen (Tylenol) tablet 650 mg  650 mg Oral Q6HRS PRN John Hein D.O.       • labetalol (NORMODYNE/TRANDATE) injection 10 mg  10 mg Intravenous Q4HRS PRN John Hein, D.O.       • ondansetron (ZOFRAN) syringe/vial injection 4 mg  4 mg Intravenous Q4HRS PRN John Hein D.O.       • ondansetron (ZOFRAN ODT) dispertab 4 mg  4 mg Oral Q4HRS PRN John Hein, D.O.       • Respiratory Therapy Consult   Nebulization Continuous RT John Hein D.O.       • ipratropium-albuterol (DUONEB) nebulizer solution  3 mL Nebulization Q2HRS PRN (RT) John Hein D.O.       • famotidine (PEPCID) tablet 20 mg  20 mg Enteral Tube Q12HRS John Hein D.O.        Or   • famotidine (PEPCID) injection 20 mg  20 mg Intravenous Q12HRS John Hein, D.O.       • senna-docusate (PERICOLACE or SENOKOT S) 8.6-50 MG per tablet 2 Tab  2 Tab Enteral Tube BID John Hein, D.O.        And   • polyethylene glycol/lytes (MIRALAX) PACKET 1 Packet  1 Packet Enteral Tube QDAY PRN SIMONE Maravilla.O.        And   • magnesium hydroxide (MILK OF MAGNESIA) suspension 30 mL  30 mL Enteral Tube QDAY PRN SIMONE Maravilla.O.        And   • bisacodyl (DULCOLAX) suppository 10 mg  10 mg Rectal QDAY PRN SIMONE Maravilla.O.       • MD Alert...ICU Electrolyte Replacement per Pharmacy   Other PHARMACY TO DOSE John Hein D.O.        • lidocaine (XYLOCAINE) 1 % injection 1-2 mL  1-2 mL Tracheal Tube Q30 MIN PRN John Rossiin, D.O.       • fentaNYL (SUBLIMAZE) injection 100 mcg  100 mcg Intravenous Q15 MIN PRN John Angel Luis, D.O.        And   • fentaNYL (SUBLIMAZE) injection 200 mcg  200 mcg Intravenous Q15 MIN PRN John Angel Luis, D.O.        And   • fentaNYL (SUBLIMAZE) 50 mcg/mL in 50mL (Continuous Infusion)   Intravenous Continuous John Angel Luis, D.O.        And   • dexmedetomidine (PRECEDEX) 400 mcg/100mL NS premix infusion  0-1.5 mcg/kg/hr Intravenous Continuous John Angel Luis, D.O.       • piperacillin-tazobactam (ZOSYN) 3.375 g in  mL IVPB  3.375 g Intravenous Once Johnalejo Rossiin, D.O.        And   • [START ON 2/5/2021] piperacillin-tazobactam (ZOSYN) 3.375 g in  mL IVPB  3.375 g Intravenous Q8HRS Johnalejo Rossiin, D.O.         Current Outpatient Medications   Medication Sig Dispense Refill   • warfarin (COUMADIN) 2 MG Tab Take 1 Tab by mouth see administration instructions. Takes 2 mg every Friday has 5 mg tablets to take all other days (Patient taking differently: Take 2 mg by mouth every Friday.) 8 Tab 0   • warfarin (COUMADIN) 5 MG Tab Take 1 Tab by mouth see administration instructions. Takes Monday, Tuesday, Wednesday, Thursday, Saturday, and Sunday, has 2 mg tablets to take on Fridays (Patient taking differently: Take 5 mg by mouth see administration instructions. Takes Monday, Tuesday, Wednesday, Thursday, Saturday, and Sunday) 24 Tab 0   • colchicine (COLCRYS) 0.6 MG Tab Take 1 Tab by mouth 2 Times a Day. (Patient taking differently: Take 0.6 mg by mouth 1 time a day as needed (Gout).) 14 Tab 0   • benzonatate (TESSALON) 100 MG Cap Take 1 Cap by mouth 3 times a day as needed for Cough. (Patient not taking: Reported on 2/4/2021) 60 Cap 0   • furosemide (LASIX) 20 MG Tab Take 1 Tab by mouth every day. 14 Tab 0   • hydrOXYzine HCl (ATARAX) 25 MG Tab Take 1 Tab by mouth 3 times a day as needed for Itching. 30 Tab 0    • cetirizine (ZYRTEC) 10 MG Tab Take 10 mg by mouth every morning.     • lisinopril (PRINIVIL) 2.5 MG Tab Take 2.5 mg by mouth every evening.     • acetaminophen (TYLENOL) 500 MG Tab Take 1,000 mg by mouth every 6 hours as needed for Mild Pain.     • tamsulosin (FLOMAX) 0.4 MG capsule TAKE 1 CAPSULE BY MOUTH EVERY DAY 30 Cap 0   • allopurinol (ZYLOPRIM) 100 MG Tab Take 100 mg by mouth every morning.     • metoprolol SR (TOPROL XL) 50 MG TABLET SR 24 HR Take 50 mg by mouth every morning.     • metFORMIN (GLUCOPHAGE) 500 MG Tab Take 500 mg by mouth 2 times a day, with meals.         Allergies  No Known Allergies    Vital Signs last 24 hours  Temp:  [36.7 °C (98 °F)] 36.7 °C (98 °F)  Pulse:  [100-193] 113  Resp:  [23-55] 55  BP: ()/() 89/55  SpO2:  [87 %-100 %] 99 %    Physical Exam  Physical Exam  Constitutional:       General: He is in acute distress.      Appearance: He is ill-appearing.   HENT:      Head: Normocephalic and atraumatic.      Nose: Nose normal.      Mouth/Throat:      Mouth: Mucous membranes are dry.      Pharynx: Oropharynx is clear.   Eyes:      Conjunctiva/sclera: Conjunctivae normal.      Pupils: Pupils are equal, round, and reactive to light.   Neck:      Musculoskeletal: Normal range of motion.   Cardiovascular:      Rate and Rhythm: Tachycardia present. Rhythm irregular.      Pulses: Normal pulses.   Pulmonary:      Effort: Respiratory distress present.   Abdominal:      Palpations: Abdomen is soft.   Musculoskeletal:         General: Swelling and tenderness present.      Right lower leg: Edema present.   Skin:     Capillary Refill: Capillary refill takes less than 2 seconds.      Findings: Rash present.   Neurological:      General: No focal deficit present.      Mental Status: He is alert.         Fluids    Intake/Output Summary (Last 24 hours) at 2/4/2021 2011  Last data filed at 2/4/2021 1839  Gross per 24 hour   Intake 100 ml   Output --   Net 100 ml       Laboratory  Recent  Results (from the past 48 hour(s))   EKG    Collection Time: 21  4:01 PM   Result Value Ref Range    Report       [facility] Emergency Dept.    Test Date:  2021  Pt Name:    AILYN MCINTOSH                 Department: ER  MRN:        [id number]                      Room:       [facility]  Gender:     Male                         Technician: [id number]  :        1955                   Requested By:ER TRIAGE PROTOCOL  Order #:    [id number]                    Reading MD:    Measurements  Intervals                                Axis  Rate:       189                          P:          0  NV:                                      QRS:        97  QRSD:       84                           T:          -56  QT:         264  QTc:        469    Interpretive Statements  SUPRAVENTRICULAR TACHYCARDIA  RIGHT AXIS DEVIATION  LOW VOLTAGE IN FRONTAL LEADS  REPOLARIZATION ABNORMALITY, PROB RATE RELATED  Compared to ECG 12/15/2020 21:50:54  Right-axis deviation now present  Low QRS voltage now present  Atrial fibrillation no longer present  Aberrant conduction of supraventricular beat(s) no longer  present     Lactic acid (lactate)    Collection Time: 21  4:15 PM   Result Value Ref Range    Lactic Acid 3.7 (H) 0.5 - 2.0 mmol/L   CBC WITH DIFFERENTIAL    Collection Time: 21  4:15 PM   Result Value Ref Range    WBC 28.8 (H) 4.8 - 10.8 K/uL    RBC 4.45 (L) 4.70 - 6.10 M/uL    Hemoglobin 14.4 14.0 - 18.0 g/dL    Hematocrit 43.2 42.0 - 52.0 %    MCV 97.1 81.4 - 97.8 fL    MCH 32.4 27.0 - 33.0 pg    MCHC 33.3 (L) 33.7 - 35.3 g/dL    RDW 62.2 (H) 35.9 - 50.0 fL    Platelet Count 175 164 - 446 K/uL    MPV 10.8 9.0 - 12.9 fL    Neutrophils-Polys 92.70 (H) 44.00 - 72.00 %    Lymphocytes 2.10 (L) 22.00 - 41.00 %    Monocytes 3.10 0.00 - 13.40 %    Eosinophils 0.40 0.00 - 6.90 %    Basophils 0.50 0.00 - 1.80 %    Immature Granulocytes 1.20 (H) 0.00 - 0.90 %    Nucleated RBC 0.00 /100 WBC    Neutrophils  (Absolute) 26.70 (H) 1.82 - 7.42 K/uL    Lymphs (Absolute) 0.59 (L) 1.00 - 4.80 K/uL    Monos (Absolute) 0.89 (H) 0.00 - 0.85 K/uL    Eos (Absolute) 0.11 0.00 - 0.51 K/uL    Baso (Absolute) 0.15 (H) 0.00 - 0.12 K/uL    Immature Granulocytes (abs) 0.34 (H) 0.00 - 0.11 K/uL    NRBC (Absolute) 0.00 K/uL   COMP METABOLIC PANEL    Collection Time: 02/04/21  4:15 PM   Result Value Ref Range    Sodium 137 135 - 145 mmol/L    Potassium 5.3 3.6 - 5.5 mmol/L    Chloride 102 96 - 112 mmol/L    Co2 19 (L) 20 - 33 mmol/L    Anion Gap 16.0 7.0 - 16.0    Glucose 209 (H) 65 - 99 mg/dL    Bun 35 (H) 8 - 22 mg/dL    Creatinine 1.54 (H) 0.50 - 1.40 mg/dL    Calcium 8.5 8.5 - 10.5 mg/dL    AST(SGOT) 117 (H) 12 - 45 U/L    ALT(SGPT) 31 2 - 50 U/L    Alkaline Phosphatase 69 30 - 99 U/L    Total Bilirubin 1.0 0.1 - 1.5 mg/dL    Albumin 3.5 3.2 - 4.9 g/dL    Total Protein 7.1 6.0 - 8.2 g/dL    Globulin 3.6 (H) 1.9 - 3.5 g/dL    A-G Ratio 1.0 g/dL   PT/INR    Collection Time: 02/04/21  4:15 PM   Result Value Ref Range    PT 19.9 (H) 12.0 - 14.6 sec    INR 1.64 (H) 0.87 - 1.13   PTT    Collection Time: 02/04/21  4:15 PM   Result Value Ref Range    APTT 40.2 (H) 24.7 - 36.0 sec   TROPONIN    Collection Time: 02/04/21  4:15 PM   Result Value Ref Range    Troponin T 20 (H) 6 - 19 ng/L   proBrain Natriuretic Peptide, NT    Collection Time: 02/04/21  4:15 PM   Result Value Ref Range    NT-proBNP 1818 (H) 0 - 125 pg/mL   Heparin Xa (Unfractionated)    Collection Time: 02/04/21  4:15 PM   Result Value Ref Range    Heparin Xa (UFH) <0.10 IU/mL   ESTIMATED GFR    Collection Time: 02/04/21  4:15 PM   Result Value Ref Range    GFR If  55 (A) >60 mL/min/1.73 m 2    GFR If Non African American 45 (A) >60 mL/min/1.73 m 2   COV-2, FLU A/B, AND RSV BY PCR (2-4 HOURS CEPHEID): Collect NP swab in VTM    Collection Time: 02/04/21  5:10 PM    Specimen: Respirate   Result Value Ref Range    Influenza virus A RNA Negative Negative    Influenza  virus B, PCR Negative Negative    RSV, PCR Negative Negative    SARS-CoV-2 by PCR NotDetected     SARS-CoV-2 Source NP Swab        Imaging  DX-CHEST-PORTABLE (1 VIEW)   Final Result      Enlarged cardiac silhouette status post cardiac surgery with small right-sided pleural effusion and vascular congestion.      EC-ECHOCARDIOGRAM COMPLETE W/O CONT    (Results Pending)   DX-CHEST-PORTABLE (1 VIEW)    (Results Pending)   DX-CHEST-PORTABLE (1 VIEW)    (Results Pending)   DX-CHEST-PORTABLE (1 VIEW)    (Results Pending)   DX-CHEST-PORTABLE (1 VIEW)    (Results Pending)       Assessment/Plan  Acute respiratory failure with hypoxia (HCC)- (present on admission)  Assessment & Plan  - Pt in respiratory distress and working hard to breathe.  ER physician intubated pt for resp distress.  - Resp distress likely due to afib with RVR which was exacerbated by right leg cellulitis.  - Pt has a component of heart failure which may also be contributing to resp distress.    Cellulitis of right lower extremity- (present on admission)  Assessment & Plan  - Pt has right lower leg redness, swelling, warmth, and tenderness.  Pt has cellulitis of right lower leg.  - Pt to receive vancomycin and zosyn for abx coverage.      Atrial fibrillation with RVR (HCC)  Assessment & Plan  - Atrial fibrillation likely due to cellulitis  - Pt being treated with diltiazem gtt for atrial fib.  - Will attempt to rate control with amiodarone.        Discussed patient condition and risk of morbidity and/or mortality with RN.    The patient remains critically ill.  Critical care time = 60 minutes in directly providing and coordinating critical care and extensive data review.  No time overlap and excludes procedures.

## 2021-02-05 NOTE — H&P
Hospital Medicine History & Physical Note    Date of Service  2/5/2021    Primary Care Physician  Yelena Haney P.A.-C.    Consultants  Critical Care-Dr. Hein  Cardiology-Dr. Galindo    Code Status  Full Code    Chief Complaint  Chief Complaint   Patient presents with   • Chest Pain   • Shortness of Breath   • Fall Less than 10 Feet     [facility]        History of Presenting Illness  65 y.o. male with a history of atrial fibrillation/sick sinus syndrome status post pacemaker on Coumadin, mitral valve replacement, coronary artery disease, resection of left atrial tumor/thrombus in 2008, redo mitral valve replacement last 2017, DVT, diabetes mellitus type 2, CKD, gout, and kidney stones, who presented via EMS 2/4/2021 from independent living facility after patient was found down on the ground for approximately 2 to 4 days.  Patient was intubated after failing a trial of BiPAP, and most of the history is obtained from conversation with ER physician as well as chart review.    Upon arrival in the ER, he had a heart rate of 190, was alert and oriented x4 and complaining of shortness of breath.  He was noted to have a right lower leg cellulitis.  He was tachypneic at respiratory rate of approximately 50 prompting trial of BiPAP.  He met criteria for sepsis with leukocytosis of 28.8 with a left shift as well as tachypnea and tachycardia.  Lactic acid was 3.7.  INR was 1.64.  Chest x-ray showed diffuse interstitial infiltrates with a small right pleural effusion.  Covid testing was negative.    Cardiology was consulted and he was given adenosine without any significant improvement of heart rate, was given diltiazem IV as well with mild improvement of his heart rate.  He was started on heparin drip for subtherapeutic INR in regards to his mitral valve.        Review of Systems  Review of Systems   Unable to perform ROS: Critical illness   Intubated    Past Medical History   has a past medical history  of Atrial fibrillation (HCC), Back pain, Bronchitis, CAD (coronary artery disease), COPD (chronic obstructive pulmonary disease) (HCC), Gout, Heart valve disease, Hypertension, Kidney stone, Obesity, Open wound (9/2/2009), Pacemaker, Personal history of venous thrombosis and embolism (2009), PVC (premature ventricular contraction) (4/13/2019), Renal disorder, and Type II or unspecified type diabetes mellitus without mention of complication, not stated as uncontrolled.    Surgical History   has a past surgical history that includes mitral valve replace (3/14/08); maze procedure (3/14/08); angiogram (7/3/2009); angiogram (7/4/2009); cath removal (7/4/2009); thrombectomy (7/4/2009); embolectomy (7/4/2009); irrigation & debridement general (7/20/2009); wide excision (7/20/2009); other cardiac surgery (2008); other abdominal surgery; mitral valve replace (3/8/2017); lopez (3/8/2017); and aortic valve replacement.     Family History  family history includes Diabetes in his mother; Lung Disease in his father.     Social History   reports that he quit smoking about 40 years ago. His smoking use included cigarettes. He has a 22.50 pack-year smoking history. He has never used smokeless tobacco. He reports current drug use. Drug: Marijuana. He reports that he does not drink alcohol.    Allergies  No Known Allergies    Medications  Prior to Admission Medications   Prescriptions Last Dose Informant Patient Reported? Taking?   acetaminophen (TYLENOL) 500 MG Tab 1/31/2021 at PRN Patient Yes No   Sig: Take 1,000 mg by mouth every 6 hours as needed for Mild Pain.   allopurinol (ZYLOPRIM) 100 MG Tab 1/31/2021 at AM Patient Yes No   Sig: Take 100 mg by mouth every morning.   benzonatate (TESSALON) 100 MG Cap Not Taking at Not Taking Patient No No   Sig: Take 1 Cap by mouth 3 times a day as needed for Cough.   Patient not taking: Reported on 2/4/2021   cetirizine (ZYRTEC) 10 MG Tab 1/31/2021 at AM Patient Yes No   Sig: Take 10 mg by  mouth every morning.   colchicine (COLCRYS) 0.6 MG Tab <14 days at [facility] Patient No No   Sig: Take 1 Tab by mouth 2 Times a Day.   Patient taking differently: Take 0.6 mg by mouth 1 time a day as needed (Gout).   furosemide (LASIX) 20 MG Tab 1/31/2021 at AM Patient No No   Sig: Take 1 Tab by mouth every day.   hydrOXYzine HCl (ATARAX) 25 MG Tab 1/30/2021 at PM Patient No No   Sig: Take 1 Tab by mouth 3 times a day as needed for Itching.   lisinopril (PRINIVIL) 2.5 MG Tab 1/30/2021 at PM Patient Yes No   Sig: Take 2.5 mg by mouth every evening.   metFORMIN (GLUCOPHAGE) 500 MG Tab 1/30/2021 at PM Patient Yes No   Sig: Take 500 mg by mouth 2 times a day, with meals.   metoprolol SR (TOPROL XL) 50 MG TABLET SR 24 HR 1/31/2021 at AM Patient Yes No   Sig: Take 50 mg by mouth every morning.   tamsulosin (FLOMAX) 0.4 MG capsule 1/31/2021 at AM Patient No No   Sig: TAKE 1 CAPSULE BY MOUTH EVERY DAY   warfarin (COUMADIN) 2 MG Tab 1/29/2021 at [facility] Patient No No   Sig: Take 1 Tab by mouth see administration instructions. Takes 2 mg every Friday has 5 mg tablets to take all other days   Patient taking differently: Take 2 mg by mouth every Friday.   warfarin (COUMADIN) 5 MG Tab 1/30/2021 at [facility] Patient No No   Sig: Take 1 Tab by mouth see administration instructions. Takes Monday, Tuesday, Wednesday, Thursday, Saturday, and Sunday, has 2 mg tablets to take on Fridays   Patient taking differently: Take 5 mg by mouth see administration instructions. Takes Monday, Tuesday, Wednesday, Thursday, Saturday, and Sunday      Facility-Administered Medications: None       Physical Exam  Temp:  [36.7 °C (98 °F)] 36.7 °C (98 °F)  Pulse:  [] 79  Resp:  [22-55] 22  BP: ()/() 73/52  SpO2:  [87 %-100 %] 100 %    Physical Exam  Vitals signs and nursing note reviewed.   Constitutional:       General: He is not in acute distress.     Appearance: He is well-developed. He is obese. He is ill-appearing and toxic-appearing. He is not  diaphoretic.      Comments: Intubated and sedated   HENT:      Head: Normocephalic and atraumatic.      Right Ear: External ear normal.      Left Ear: External ear normal.      Nose: Nose normal.      Mouth/Throat:      Mouth: Mucous membranes are dry.      Comments: intubated  Eyes:      General: No scleral icterus.        Right eye: No discharge.         Left eye: No discharge.      Conjunctiva/sclera: Conjunctivae normal.      Pupils: Pupils are equal, round, and reactive to light.   Neck:      Musculoskeletal: Neck supple.      Thyroid: No thyromegaly.      Vascular: No JVD.      Trachea: No tracheal deviation.   Cardiovascular:      Rate and Rhythm: Tachycardia present. Rhythm irregular.      Heart sounds: Normal heart sounds. No murmur. No friction rub. No gallop.    Pulmonary:      Effort: Respiratory distress present.      Breath sounds: No stridor. Rales present. No wheezing.      Comments: Mechanically ventilated  Abdominal:      General: Bowel sounds are normal. There is distension.      Palpations: Abdomen is soft. There is no mass.      Tenderness: There is no abdominal tenderness. There is no guarding or rebound.   Musculoskeletal:         General: No tenderness or deformity.      Right lower leg: Edema (trace) present.      Left lower leg: Edema (trace) present.   Lymphadenopathy:      Cervical: No cervical adenopathy.   Skin:     General: Skin is warm and dry.      Capillary Refill: Capillary refill takes more than 3 seconds.      Coloration: Skin is not pale.      Findings: Erythema (right lower extremity with induration and warmth) present. No rash.   Neurological:      Motor: No abnormal muscle tone.      Comments: Intubated and sedated   Psychiatric:      Comments: Unable to assess.  Intubated and sedated.         Laboratory:  Recent Labs     02/04/21  1615   WBC 28.8*   RBC 4.45*   HEMOGLOBIN 14.4   HEMATOCRIT 43.2   MCV 97.1   MCH 32.4   MCHC 33.3*   RDW 62.2*   PLATELETCT 175   MPV 10.8          Recent Labs     02/04/21  1615   SODIUM 137   POTASSIUM 5.3   CHLORIDE 102   CO2 19*   GLUCOSE 209*   BUN 35*   CREATININE 1.54*   CALCIUM 8.5     Recent Labs     02/04/21  1615   ALTSGPT 31   ASTSGOT 117*   ALKPHOSPHAT 69   TBILIRUBIN 1.0   GLUCOSE 209*     Recent Labs     02/04/21  1615   APTT 40.2*   INR 1.64*     Recent Labs     02/04/21  1615   NTPROBNP 1818*         Recent Labs     02/04/21  1615   TROPONINT 20*       Imaging:  EC-ECHOCARDIOGRAM COMPLETE W/O CONT   Final Result      DX-CHEST-PORTABLE (1 VIEW)   Final Result      Endotracheal tube in place as noted above. Stable cardiopulmonary appearance.      DX-CHEST-PORTABLE (1 VIEW)   Final Result      Enlarged cardiac silhouette status post cardiac surgery with small right-sided pleural effusion and vascular congestion.      DX-CHEST-PORTABLE (1 VIEW)    (Results Pending)   DX-CHEST-PORTABLE (1 VIEW)    (Results Pending)       This x-ray per my read shows diffuse interstitial infiltrates concerning for pulmonary edema as well as a small right pleural effusion.          EKG per my read shows supraventricular tachycardia with heart rate of 189, QTc 469, no significant ST elevation or depression.    Assessment/Plan:  I anticipate this patient will require at least two midnights for appropriate medical management, necessitating inpatient admission.    * Acute respiratory failure with hypoxia (HCC)- (present on admission)  Assessment & Plan  Etiology unclear but may be secondary to worsening heart failure.    Admit to ICU.  Stat echocardiogram.  Ventilator management per critical care.  Sedation with fentanyl and propofol.    Sepsis (HCC)- (present on admission)  Assessment & Plan  This is Severe Sepsis Present on admission  SIRS criteria identified on my evaluation include: Tachycardia, with heart rate greater than 90 BPM, Tachypnea, with respirations greater than 20 per minute, Leukocytosis, with WBC greater than 12,000 and Bandemia, greater than 10%  bands  Source of infection is left lower extremity cellulitis  Clinical indicators of end organ dysfunction include Systolic blood pressure (SBP) <90 mmHg or mean arterial pressure <65 mmHg, Acute respiratory failure as evidenced by a new need for invasive or non-invasive mechanical ventilation (Ventilator or BiPap)  and Lactate >2 mmol/L (18.0 mg/dL)  Sepsis protocol initiated  Fluid resuscitation ordered per protocol  IV antibiotics as appropriate for source of sepsis: Unasyn change to Zosyn per Critical Care  Reassessment: I have reassessed the patient's hemodynamic status  End organ dysfunction include(s):  Acute respiratory failure and Acute kidney failure      Atrial fibrillation with RVR (HCC)- (present on admission)  Assessment & Plan  Possibly contributing to heart failure.    STAT echocardiogram.  Started on amiodarone per cardiology.  Appreciate cardiology consult.    Lactic acid acidosis- (present on admission)  Assessment & Plan  Secondary to tissue ischemia in setting of severe sepsis.  New fluids as per sepsis protocol for resuscitation.  Pressors as needed.    Paroxysmal SVT (supraventricular tachycardia) (HCC)- (present on admission)  Assessment & Plan  Initially noted to be in supraventricular tachycardia changing to A. fib with RVR.    Cellulitis of right lower extremity- (present on admission)  Assessment & Plan  History of left lower cellulitis now with right lower extremity erythema and induration consistent with cellulitis.  Likely source of sepsis.    Unasyn started by change to Zosyn as per critical care due to severe sepsis.

## 2021-02-05 NOTE — ASSESSMENT & PLAN NOTE
Classic right lower extremity changes consistent with cellulitis-no change  Blood culture x1+ for strep species?-Growing Streptococcus pyogenes  Vancomycin and Zosyn initiated in ER, de-escalate as appropriate  May need to transition to linezolid if creatinine gets any higher  Consult ID evaluation with mechanical valves and positive blood cultures a started bug [facility], 1 patient crushed me  Echocardi if you could wait on type and duration for S120 and change antibiotics now if you would like and then see tomorrow or even have somebody else see Monday that would be great.    Antibiotics Mr. Aceves has right lower extremity cellulitis with sepsis coming off pressors and the ventilator  1 of 2 blood cultures positive for Streptococcus pyogenes with initial treatment Vanco Zosyn,  KD 2/6 negative for endocarditis he has 2 bioprosthetic valves  Noninvasive study negative for DVT bilaterally in the lower extremities 2/5  ID following and repeat blood cultures to be obtained in antibiotics modified to penicillin, plan prolonged course

## 2021-02-05 NOTE — PROGRESS NOTES
Critical Care Progress Note    Date of admission  2/4/2021    Chief Complaint  65 y.o. male who presented 2/4/2021 after being found down after a fall.  Pt complained of chest pain and SOB.  Pt found to be in a fib with RVR and had right leg cellulitis.  Pt was working hard to breathe and ER doctor thought it was beneficial to intubate pt prior to respiratory failure.    Hospital Course  No notes on file    Interval Problem Update  Reviewed last 24 hour events:    Sedate on vent, withdraws, not following  DEX 0.5  SVT/AF -> SR 80s, PVCs  SBp 90-120s  DILT off  NE drip 10 -> 6  Heparin drip (M&Ao valve)  UO adequate  ECHO AF, EF 45%, Severe LAE, MV prosthesis normal  V#2 50% PEEP 8  CXR better R base  Secretions small  ABG 7.39/34/135  Tm 102  WBC 26.9, slt better  LA 2.2 - better  BC strep?  Pepcid    Saline bolus  NIVT, rule out DVT  Mg/Phos  Mod SSi, I - Drip?  Place CVC    Patient remains on norepinephrine which were actively titrating  No response to two 1 L boluses, CVC to be placed    Noninvasive study negative for DVT  Case reviewed with Dr. Cedeno with cardiology of the length at the bedside  Reviewed case with sister at length at the bedside by phone and obtain consent for CVC  CVC placed without difficulty, good position of catheter, no pneumothorax    Review of Systems  Review of Systems   Unable to perform ROS: Acuity of condition        Vital Signs for last 24 hours   Pulse:  [] 80  Resp:  [19-55] 20  BP: ()/(48-74) 104/71  SpO2:  [92 %-100 %] 98 %    Hemodynamic parameters for last 24 hours       Respiratory Information for the last 24 hours  Vent Mode: APVCMV  Rate (breaths/min): 20  Vt Target (mL): 440  PEEP/CPAP: 8  MAP: 12  Control VTE (exp VT): 442    Physical Exam   Physical Exam  Vitals signs reviewed.   Constitutional:       Appearance: He is obese. He is ill-appearing. He is not toxic-appearing.      Interventions: He is sedated, intubated and restrained.   HENT:      Head:  Normocephalic and atraumatic.      Mouth/Throat:      Mouth: Mucous membranes are moist.   Eyes:      General: No scleral icterus.     Extraocular Movements: Extraocular movements intact.      Pupils: Pupils are equal, round, and reactive to light.   Neck:      Musculoskeletal: Neck supple. No neck rigidity.      Vascular: No JVD.   Cardiovascular:      Rate and Rhythm: Normal rate and regular rhythm.      Pulses: Normal pulses.      Heart sounds: Murmur present. No gallop.       Comments: SR  Pulmonary:      Effort: Pulmonary effort is normal. He is intubated.      Breath sounds: No wheezing, rhonchi or rales.   Abdominal:      General: Abdomen is protuberant. Bowel sounds are normal. There is no distension.      Palpations: Abdomen is soft. There is no fluid wave, hepatomegaly or splenomegaly.      Tenderness: There is no abdominal tenderness. There is no right CVA tenderness, left CVA tenderness or guarding. Negative signs include Hathaway's sign and McBurney's sign.   Genitourinary:     Comments: Fragoso  Musculoskeletal:      Right lower leg: Edema present.      Left lower leg: Edema present.   Lymphadenopathy:      Cervical: No cervical adenopathy.   Skin:     General: Skin is warm and dry.      Coloration: Skin is not cyanotic or mottled.      Findings: Erythema and wound present.      Nails: There is no clubbing.        Comments: Right lower extremity from ankle to knee with increased erythema, increased warmth, tenderness and one small area of right lateral ankle where there is a cloudy fluid-filled blister that is about 4 mm x 10 mm all consistent with cellulitis   Neurological:      Mental Status: He is lethargic.      GCS: GCS eye subscore is 4. GCS verbal subscore is 1. GCS motor subscore is 6.      Cranial Nerves: Cranial nerves are intact.      Sensory: Sensation is intact.      Motor: Motor function is intact.      Comments: Sedate, follows   Psychiatric:      Comments: Unable to assess          Medications  Current Facility-Administered Medications   Medication Dose Route Frequency Provider Last Rate Last Admin   • norepinephrine (Levophed) infusion 8 mg/250 mL (premix)  0-30 mcg/min Intravenous Continuous Deepak Bravo M.D. 5.6 mL/hr at 02/05/21 1900 3 mcg/min at 02/05/21 1900   • insulin regular (HumuLIN R,NovoLIN R) injection  2-9 Units Subcutaneous Q6HRS Mckay Diaz M.D.   3 Units at 02/05/21 1826    And   • glucose 4 g chewable tablet 16 g  16 g Oral Q15 MIN PRN Mckay Diaz M.D.        And   • dextrose 50% (D50W) injection 50 mL  50 mL Intravenous Q15 MIN PRN Mckay Diaz M.D.       • MIDAZOLAM HCL 2 MG/2ML INJ SOLN (WRAPPED)            • amiodarone (Cordarone) tablet 400 mg  400 mg Oral TWICE DAILY Josafat Cedeno M.D.       • [START ON 2/20/2021] amiodarone (Cordarone) tablet 200 mg  200 mg Oral DAILY Josafat Cedeno M.D.       • morphine (pf) 4 mg/mL injection 4 mg  4 mg Intravenous Q30 MIN PRN Esequiel Colindres M.D.   4 mg at 02/04/21 1809   • Respiratory Therapy Consult   Nebulization Continuous RT Gerald Williamson M.D.       • ipratropium-albuterol (DUONEB) nebulizer solution  3 mL Nebulization Q2HRS PRN (RT) Gerald Williamson M.D.       • famotidine (PEPCID) tablet 20 mg  20 mg Enteral Tube Q12HRS Gerald Williamson M.D.   20 mg at 02/05/21 1809    Or   • famotidine (PEPCID) injection 20 mg  20 mg Intravenous Q12HRS Gerald Williamson M.D.   20 mg at 02/05/21 0614   • senna-docusate (PERICOLACE or SENOKOT S) 8.6-50 MG per tablet 2 Tab  2 Tab Enteral Tube BID Gerald Williamson M.D.   2 Tab at 02/05/21 1809    And   • polyethylene glycol/lytes (MIRALAX) PACKET 1 Packet  1 Packet Enteral Tube QDAY PRN Gerald J Park, M.D.        And   • magnesium hydroxide (MILK OF MAGNESIA) suspension 30 mL  30 mL Enteral Tube QDAY PRN Gerald Williamson M.D.        And   • bisacodyl (DULCOLAX) suppository 10 mg  10 mg Rectal QDAY PRN Gerald Williamson M.D.       • MD Alert...ICU Electrolyte Replacement per  Pharmacy   Other PHARMACY TO DOSE Gerald Williamson M.D.       • lidocaine (XYLOCAINE) 1 % injection 1-2 mL  1-2 mL Tracheal Tube Q30 MIN PRN Gerald Williamson M.D.       • lactated ringers infusion (BOLUS): BMI greater than 30  30 mL/kg (Ideal) Intravenous Once PRN Gerald Williamson M.D.       • lactated ringers infusion (BOLUS)  500 mL Intravenous Once PRN Gerald Williamson M.D.       • heparin infusion 25,000 units in 500 mL 0.45% NACL  0-30 Units/kg/hr (Adjusted) Intravenous Continuous Gerald Williamson M.D. 35.6 mL/hr at 02/05/21 1903 20 Units/kg/hr at 02/05/21 1903   • heparin injection 3,600 Units  40 Units/kg (Adjusted) Intravenous PRN Gerald Williamson M.D.   3,600 Units at 02/05/21 0426   • NS infusion   Intravenous Continuous Johnalejo Rossiin, D.O. 75 mL/hr at 02/05/21 1900 Rate Change at 02/05/21 1900   • acetaminophen (Tylenol) tablet 650 mg  650 mg Enteral Tube Q6HRS PRN John Rossiin, D.O.   650 mg at 02/05/21 1406   • labetalol (NORMODYNE/TRANDATE) injection 10 mg  10 mg Intravenous Q4HRS PRN John Wilksklin, D.O.       • ondansetron (ZOFRAN) syringe/vial injection 4 mg  4 mg Intravenous Q4HRS PRN John Rossiin, D.O.       • ondansetron (ZOFRAN ODT) dispertab 4 mg  4 mg Oral Q4HRS PRN John Angel Luis, D.O.       • fentaNYL (SUBLIMAZE) injection 100 mcg  100 mcg Intravenous Q15 MIN PRN Johnalejo Rossiin, D.O.   50 mcg at 02/05/21 1655    And   • fentaNYL (SUBLIMAZE) injection 200 mcg  200 mcg Intravenous Q15 MIN PRN John Wilksklin, D.O.        And   • fentaNYL (SUBLIMAZE) 50 mcg/mL in 50mL (Continuous Infusion)   Intravenous Continuous John Angel Luis, D.O.   Stopped at 02/05/21 0045    And   • dexmedetomidine (PRECEDEX) 400 mcg/100mL NS premix infusion  0-1.5 mcg/kg/hr Intravenous Continuous SIMONE Maravilla.OHasmukh 14.1 mL/hr at 02/05/21 1900 0.5 mcg/kg/hr at 02/05/21 1900   • piperacillin-tazobactam (ZOSYN) 3.375 g in  mL IVPB  3.375 g Intravenous Q8HRS SIMONE Maravilla.O.   Stopped at 02/05/21 1720   • 5% dextrose infusion    Intravenous Continuous John Rossiin, D.O. 30 mL/hr at 02/05/21 1900 Rate Change at 02/05/21 1900   • amiodarone (NEXTERONE) 360 mg/200 mL infusion  0.5-1 mg/min Intravenous Continuous John Angel Luis, D.O. 17 mL/hr at 02/05/21 1900 0.5 mg/min at 02/05/21 1900       Fluids    Intake/Output Summary (Last 24 hours) at 2/5/2021 1943  Last data filed at 2/5/2021 1923  Gross per 24 hour   Intake 5351.97 ml   Output 1195 ml   Net 4156.97 ml       Laboratory  Recent Labs     02/04/21  1950 02/05/21  0535   ISTATAPH 7.336* 7.414   ISTATAPCO2 40.9* 32.3   ISTATAPO2 308* 126*   ISTATATCO2 23 22   [id number] 100* 99   ISTATARTHCO3 21.9 20.7   ISTATARTBE -4 -3   ISTATTEMP 100.4 F 101.1 F   ISTATFIO2 100 50   ISTATSPEC Arterial Arterial   ISTATAPHTC 7.322* 7.393*   [id number] 313* 135*         Recent Labs     02/04/21  1615 02/05/21  0550   SODIUM 137 130*   POTASSIUM 5.3 4.3   CHLORIDE 102 98   CO2 19* 21   BUN 35* 47*   CREATININE 1.54* 1.78*   MAGNESIUM  --  1.8   PHOSPHORUS  --  1.8*   CALCIUM 8.5 8.0*     Recent Labs     02/04/21  1615 02/05/21  0550   ALTSGPT 31  --    ASTSGOT 117*  --    ALKPHOSPHAT 69  --    TBILIRUBIN 1.0  --    GLUCOSE 209* 285*     Recent Labs     02/04/21  1615 02/05/21  0550   WBC 28.8* 26.9*   NEUTSPOLYS 92.70* 91.80*   LYMPHOCYTES 2.10* 3.10*   MONOCYTES 3.10 3.40   EOSINOPHILS 0.40 0.00   BASOPHILS 0.50 0.30   ASTSGOT 117*  --    ALTSGPT 31  --    ALKPHOSPHAT 69  --    TBILIRUBIN 1.0  --      Recent Labs     02/04/21  1615 02/05/21  0550   RBC 4.45* 3.97*   HEMOGLOBIN 14.4 12.7*   HEMATOCRIT 43.2 38.7*   PLATELETCT 175 165   PROTHROMBTM 19.9*  --    APTT 40.2*  --    INR 1.64*  --        Imaging  X-Ray:  I have personally reviewed the images and compared with prior images.  EKG:  I have personally reviewed the images and compared with prior images.  Echo:   Reviewed    Assessment/Plan  * Acute respiratory failure with hypoxia (HCC)- (present on admission)  Assessment & Plan  Intubated in ER for  respiratory distress 2/4  Combination of A. fib RVR and valvular heart disease with sepsis from right lower extremity cellulitis  RT protocols  Ventilator bundle  Serial ABG/chest x-ray/ventilator mechanics review  Daily SATs  SBT when clinically appropriate  Echo performed and patient not in profound failure although component of CHF may have contributed to failure  Chest x-ray with subtle right-sided abnormalities, monitor for atelectasis/pneumonia/aspiration pneumonia/KYLAH  Forced diuresis as clinically appropriate, currently titrating norepinephrine for hypotension      Cellulitis of right lower extremity- (present on admission)  Assessment & Plan  Classic right lower extremity changes consistent with cellulitis  Blood culture x1+ for strep species?  Vancomycin and Zosyn initiated in ER, continue  May need to transition to linezolid if creatinine gets any higher  Consider ID evaluation with mechanical valves  Echocardiogram noted  Noninvasive study negative for DVT bilaterally in the lower extremities 2/5      Sepsis (HCC)- (present on admission)  Assessment & Plan  This is Septic shock Present on admission  SIRS criteria identified on my evaluation include: Tachycardia, with heart rate greater than 90 BPM, Tachypnea, with respirations greater than 20 per minute and Leukocytosis, with WBC greater than 12,000  Source is cellulitis  Presentation includes: Severe sepsis present and persistent hypotension after 30 ml/kg completed.   Despite appropriate fluid resuscitation with crystalloid given per sepsis guidelines, the patient remains hypotensive with systolic blood pressure less than 90 or MAP less than 65  Hemodynamic support with additional fluids and IV vasopressors as needed to maintain a SBP of 90 or MAP of 65  IV antibiotics as appropriate for source of sepsis  Reassessment: I have reassessed the patient's hemodynamic status    Right lower extremity cellulitis my exam  1 blood culture growing strep  species  Actively titrating norepinephrine, CVC placed  Status post repeated 1 L boluses  Echocardiogram with normal aortic and mitral valve function (both are artificial valves)  Hypotension persisting despite conversion to sinus rhythm with amiodarone      Atrial fibrillation with RVR ([facility])- (present on admission)  Assessment & Plan  Atrial fibrillation likely due to sepsis/cellulitis  Converted to sinus rhythm with amiodarone bolus and infusion overnight  Heparin drip ongoing  Optimize electrolytes  Transition to oral amiodarone when okay with cardiology      Lactic acid acidosis- (present on admission)  Assessment & Plan  Secondary to sepsis, improved    COPD (chronic obstructive pulmonary disease) ([facility])- (present on admission)  Assessment & Plan  History of COPD  Not overly bronchospastic or exacerbating at this time  RT protocols  Nebulizer treatments 4 times daily and as needed  Update vaccines prior to discharge    Paroxysmal atrial fibrillation ([facility])- (present on admission)  Assessment & Plan  PAF has converted to sinus rhythm  Anticoagulated with heparin infusion  Amiodarone drip ongoing  Optimize electrolytes  Echocardiogram noted  Cardiology following    Pacemaker- (present on admission)  Assessment & Plan  Cardiac monitoring  Optimize electrolytes  Cardiology following    Type 2 diabetes mellitus without complication, without long-term current use of insulin ([facility])- (present on admission)  Assessment & Plan  Sliding scale insulin as needed  Monitor for the need to transition to insulin continuous infusion  Serial blood sugar testing  Hypoglycemia protocols    ESTELA (obstructive sleep apnea)- (present on admission)  Assessment & Plan  Reassess with review of systems as well as observation for apnea once extubated    H/O mitral valve and aortic valve replacement- (present on admission)  Assessment & Plan  Echocardiogram 2/4 revealed functioning prosthetic mitral and aortic valves  Cardiology  following  Consider ID consultation pending results of blood cultures    Anticoagulated- (present on admission)  Assessment & Plan  Heparin drip for PAF, cardiology following       VTE:  Heparin  Ulcer: H2 Antagonist  Lines: Central Line  Ongoing indication addressed and Fragoso Catheter  Ongoing indication addressed    I have performed a physical exam and reviewed and updated ROS and Plan today (2/5/2021). In review of yesterday's note (2/4/2021), there are no changes except as documented above.     Discussed patient condition and risk of morbidity and/or mortality with Family, RN, RT, Pharmacy, Charge nurse / hot rounds and cardiology  The patient remains critically ill.  Critical care time = 50 minutes in directly providing and coordinating critical care and extensive data review.  No time overlap and excludes procedures.

## 2021-02-05 NOTE — CARE PLAN
Problem: Safety  Goal: Will remain free from injury  Note: Bed in low locked position room near nurses station      Problem: Respiratory:  Goal: Respiratory status will improve  Note: Collabortation with RT in place

## 2021-02-05 NOTE — ASSESSMENT & PLAN NOTE
Intubated in ER for respiratory distress 2/4  Combination of A. fib RVR and valvular heart disease with sepsis from right lower extremity cellulitis  RT protocols  Extubated 2/6  Mobilize  Incentive spirometry  Monitor for ESTELA desaturations, auto BiPAP as needed

## 2021-02-05 NOTE — PROGRESS NOTES
Cardiology Follow Up Progress Note    Date of Service  2/5/2021    Attending Physician  Gerald Williamson M.D.    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 Parnell on 3/8/2017, hypertension, DVT, type 2 diabetes.    Resides in an independent living.  He was found down on the ground for unknown amount of time  Found to have a Heart rate of 190 SVT, complaint of dyspnea, right leg pain, heart rate did not significantly improve with adenosine and diltiazem.  Worsening respiratory status status post intubation 2/4/2020 at 7 PM  Recent diagnosis of left lower extremity cellulitis, was hospitalized on 12/15/2020-12/24/2020.  MPI negative for ischemia 11/20  Normal cath in 2017      Interim Events    Currently paced rhythm  Underlying sinus on amiodarone drip  Remains intubated, sedated  On antibiotics  Labs reviewed  Leukocytosis, WBC 27  Cr 1.78  K4.3    Mag 1.8   Levophed for managing BP       Review of Systems  Review of Systems   Unable to perform ROS: Intubated       Vital signs in last 24 hours  Temp:  [36.7 °C (98 °F)] 36.7 °C (98 °F)  Pulse:  [] 82  Resp:  [19-55] 22  BP: ()/() 91/64  SpO2:  [87 %-100 %] 98 %    Physical Exam  Physical Exam  Constitutional:       Comments: Intubated, sedated   Cardiovascular:      Pulses: Normal pulses.      Comments: Paced rhythm    Trace lower extremity edema  Skin:     General: Skin is warm.      Comments: Right leg erythema, induration, consistent with cellulitis   Neurological:      Comments: Intubated sedated         Lab Review  Lab Results   Component Value Date/Time    WBC 26.9 (H) 02/05/2021 05:50 AM    RBC 3.97 (L) 02/05/2021 05:50 AM    HEMOGLOBIN 12.7 (L) 02/05/2021 05:50 AM    HEMATOCRIT 38.7 (L) 02/05/2021 05:50 AM    MCV  97.5 02/05/2021 05:50 AM    MCH 32.0 02/05/2021 05:50 AM    MCHC 32.8 (L) 02/05/2021 05:50 AM    MPV 11.4 02/05/2021 05:50 AM      Lab Results   Component Value Date/Time    SODIUM 130 (L) 02/05/2021 05:50 AM    POTASSIUM 4.3 02/05/2021 05:50 AM    CHLORIDE 98 02/05/2021 05:50 AM    CO2 21 02/05/2021 05:50 AM    GLUCOSE 285 (H) 02/05/2021 05:50 AM    BUN 47 (H) 02/05/2021 05:50 AM    CREATININE 1.78 (H) 02/05/2021 05:50 AM    CREATININE 1.4 04/27/2009 10:08 AM      Lab Results   Component Value Date/Time    ASTSGOT 117 (H) 02/04/2021 04:15 PM    ALTSGPT 31 02/04/2021 04:15 PM     Lab Results   Component Value Date/Time    CHOLSTRLTOT 126 11/20/2020 05:49 PM    LDL 61 11/20/2020 05:49 PM    HDL 22 (A) 11/20/2020 05:49 PM    TRIGLYCERIDE 217 (H) 11/20/2020 05:49 PM    TROPONINT 20 (H) 02/04/2021 04:15 PM       Recent Labs     02/04/21  1615   NTPROBNP 1818*       Cardiac Imaging and Procedures Review  EKG: SVT, rate 190    Echocardiogram:    2/4/2021  LVEF 45%  Known mitral valve bioprosthesis which is functioning normally  Moderate TR    Cardiac Catheterization:   Normal cath in 2008 & 2017 prior to mitral valve replacement    Imaging  Chest X-Ray:      Stress Test:    MPI 2020  No evidence of inferolateral reversible ischemia    Assessment/Plan  Paroxysmal SVT/history of A. fib  -Presented with SVT, heart rate 190s  refractory to adenosine, diltiazem  -Initiation of IV amiodarone (2/4/2021 ~ 11 pm)  -IV Heparin  -Currently paced rhythm, underlying sinus rhythm.  -Mildly depressed EF, 45% likely secondary to rapid heart rate.  -Discussed with [employer], will check the device this admission.      Acute respiratory failure with hypoxia  VDRF  -Intubated overnight in ER due to worsening dyspnea not tolerating BiPAP  -NT proBNP 1800 admit  -CXR this morning consistent with mild pulmonary edema, right basilar atelectasis, small right pleural effusion.    Sepsis  -Leukocytosis, WBC 27 with left shift  -Recent history of  left lower leg cellulitis 12/2020  -Now with right lower extremity erythema consistent with cellulitis  -Currently on Zosyn  -Requires Levophed for BP control  -IV fluids running at 75 cc/h      Known history of mitral valve replacement 2008 with redo in 2017  -Known mitral valve bioprosthesis which is functioning normally      [employer] pacemaker in situ, 2014  -Appears to be functioning well  -Discussed with the rep will check device this admission.    Cardiology will follow along  Thank you for allowing me to participate in the care of this patient.      Please contact me with any questions.    CON Cooper.   Cardiologist, [facility]  [phone]

## 2021-02-05 NOTE — ED PROVIDER NOTES
ED Provider Note    Scribed for Esequiel Colindres M.D. by Zeinab Ambriz. 2/4/2021  4:14 PM    Primary care provider: Yelena Haney P.A.-C.  Means of arrival: EMS  History obtained from: Patient  History limited by: Urgency of the patient's condition  CHIEF COMPLAINT  Chief Complaint   Patient presents with   • Chest Pain   • Shortness of Breath   • Fall Less than 10 Feet     [facility]        HPI  Morales Olivier is a 65 y.o. male who presents to the Emergency Department for chest pain onset 3-4 days ago. Patient was found down after a ground level fall 2-4 days ago. He has a history of heart failure and hypertension which he treats with Metoprolol and Coumadin. Patient has an associated mild cough and shortness of breath, but denies vomiting, diarrhea, fever, changes in taste or smell. He endorses smoking but denies use of alcohol. Patient reports receiving his first dose of the COVID vaccine.     No further details of history of present illness can be obtained within the constraints of urgency of the patient's clinical condition      REVIEW OF SYSTEMS  Pertinent positives include chest pain, mild cough, and shortness of breath. Pertinent negatives include no vomiting, diarrhea, fever, changes in taste or smell.    No further details of review of systems can be obtained within the constraints of urgency of the patient's clinical condition    PAST MEDICAL HISTORY   has a past medical history of Atrial fibrillation (HCC), Back pain, Bronchitis, CAD (coronary artery disease), COPD (chronic obstructive pulmonary disease) (HCC), Gout, Heart valve disease, Hypertension, Kidney stone, Obesity, Open wound (9/2/2009), Pacemaker, Personal history of venous thrombosis and embolism (2009), PVC (premature ventricular contraction) (4/13/2019), Renal disorder, and Type II or unspecified type diabetes mellitus without mention of complication, not stated as uncontrolled.    SURGICAL HISTORY   has a past  "surgical history that includes mitral valve replace (3/14/08); maze procedure (3/14/08); angiogram (7/3/2009); angiogram (2009); cath removal (2009); thrombectomy (2009); embolectomy (2009); irrigation & debridement general (2009); wide excision (2009); other cardiac surgery (); other abdominal surgery; mitral valve replace (3/8/2017); lopez (3/8/2017); and aortic valve replacement.    SOCIAL HISTORY  Social History     Tobacco Use   • Smoking status: Former Smoker     Packs/day: 2.50     Years: 9.00     Pack years: 22.50     Types: Cigarettes     Quit date: 1981     Years since quittin.1   • Smokeless tobacco: Never Used   Substance Use Topics   • Alcohol use: No   • Drug use: Yes     Types: Marijuana      Social History     Substance and Sexual Activity   Drug Use Yes   • Types: Marijuana       FAMILY HISTORY  Family History   Problem Relation Age of Onset   • Diabetes Mother    • Lung Disease Father    • Heart Disease Neg Hx        CURRENT MEDICATIONS  Home Medications    **Home medications have not yet been reviewed for this encounter**         ALLERGIES  No Known Allergies    PHYSICAL EXAM  VITAL SIGNS: /97   Pulse (!) 189   Temp 36.7 °C (98 °F) (Tympanic)   Resp (!) 42   Ht 1.778 m (5' 10\")   Wt 113 kg (250 lb)   SpO2 (!) 87%   BMI 35.87 kg/m²     Vital signs reviewed.  Constitutional:  Ill-appearing, moderate to severe respiratory distress with grunting respirations  Head: Normocephalic.   Eyes: EOM are normal. Pupils are equal, round, and reactive to light.   Neck: Normal range of motion. Neck supple  Cardiovascular: tachycardic, irregular rate and rhythm   Pulmonary/Chest: mild expiratory wheezing bilaterally  Abdominal: normal bowel sounds, soft   Musculoskeletal: Exhibits no edema.   Lymphadenopathy: No cervical adenopathy.  Neurological: Patient is alert and oriented to person, place, and time. CNs II - XII intact. DTRs intact. Normal sensation and " strength.  Skin: right lower extremity warm, tender, and erythematous diffusely  Psychiatric: Patient has a normal mood and affect. Behavior is normal.    LABS  Results for orders placed or performed during the hospital encounter of 02/04/21   Lactic acid (lactate)   Result Value Ref Range    Lactic Acid 3.7 (H) 0.5 - 2.0 mmol/L   CBC WITH DIFFERENTIAL   Result Value Ref Range    WBC 28.8 (H) 4.8 - 10.8 K/uL    RBC 4.45 (L) 4.70 - 6.10 M/uL    Hemoglobin 14.4 14.0 - 18.0 g/dL    Hematocrit 43.2 42.0 - 52.0 %    MCV 97.1 81.4 - 97.8 fL    MCH 32.4 27.0 - 33.0 pg    MCHC 33.3 (L) 33.7 - 35.3 g/dL    RDW 62.2 (H) 35.9 - 50.0 fL    Platelet Count 175 164 - 446 K/uL    MPV 10.8 9.0 - 12.9 fL    Neutrophils-Polys 92.70 (H) 44.00 - 72.00 %    Lymphocytes 2.10 (L) 22.00 - 41.00 %    Monocytes 3.10 0.00 - 13.40 %    Eosinophils 0.40 0.00 - 6.90 %    Basophils 0.50 0.00 - 1.80 %    Immature Granulocytes 1.20 (H) 0.00 - 0.90 %    Nucleated RBC 0.00 /100 WBC    Neutrophils (Absolute) 26.70 (H) 1.82 - 7.42 K/uL    Lymphs (Absolute) 0.59 (L) 1.00 - 4.80 K/uL    Monos (Absolute) 0.89 (H) 0.00 - 0.85 K/uL    Eos (Absolute) 0.11 0.00 - 0.51 K/uL    Baso (Absolute) 0.15 (H) 0.00 - 0.12 K/uL    Immature Granulocytes (abs) 0.34 (H) 0.00 - 0.11 K/uL    NRBC (Absolute) 0.00 K/uL   COMP METABOLIC PANEL   Result Value Ref Range    Sodium 137 135 - 145 mmol/L    Potassium 5.3 3.6 - 5.5 mmol/L    Chloride 102 96 - 112 mmol/L    Co2 19 (L) 20 - 33 mmol/L    Anion Gap 16.0 7.0 - 16.0    Glucose 209 (H) 65 - 99 mg/dL    Bun 35 (H) 8 - 22 mg/dL    Creatinine 1.54 (H) 0.50 - 1.40 mg/dL    Calcium 8.5 8.5 - 10.5 mg/dL    AST(SGOT) 117 (H) 12 - 45 U/L    ALT(SGPT) 31 2 - 50 U/L    Alkaline Phosphatase 69 30 - 99 U/L    Total Bilirubin 1.0 0.1 - 1.5 mg/dL    Albumin 3.5 3.2 - 4.9 g/dL    Total Protein 7.1 6.0 - 8.2 g/dL    Globulin 3.6 (H) 1.9 - 3.5 g/dL    A-G Ratio 1.0 g/dL   PT/INR   Result Value Ref Range    PT 19.9 (H) 12.0 - 14.6 sec    INR  1.64 (H) 0.87 - 1.13   PTT   Result Value Ref Range    APTT 40.2 (H) 24.7 - 36.0 sec   COV-2, FLU A/B, AND RSV BY PCR (2-4 HOURS CEPHEID): Collect NP swab in VTM    Specimen: Respirate   Result Value Ref Range    Influenza virus A RNA Negative Negative    Influenza virus B, PCR Negative Negative    RSV, PCR Negative Negative    SARS-CoV-2 by PCR NotDetected     SARS-CoV-2 Source NP Swab    TROPONIN   Result Value Ref Range    Troponin T 20 (H) 6 - 19 ng/L   proBrain Natriuretic Peptide, NT   Result Value Ref Range    NT-proBNP 1818 (H) 0 - 125 pg/mL   Heparin Xa (Unfractionated)   Result Value Ref Range    Heparin Xa (UFH) <0.10 IU/mL   Lactic Acid Every four hours after STAT order   Result Value Ref Range    Lactic Acid 3.6 (H) 0.5 - 2.0 mmol/L   Urinalysis    Specimen: Urine, Fragoso Cath   Result Value Ref Range    Color DK Yellow     Character Turbid (A)     Specific Gravity 1.033 <1.035    Ph 5.0 5.0 - 8.0    Glucose Negative Negative mg/dL    Ketones Negative Negative mg/dL    Protein 100 (A) Negative mg/dL    Bilirubin Moderate (A) Negative    Urobilinogen, Urine 0.2 Negative    Nitrite Negative Negative    Leukocyte Esterase Trace (A) Negative    Occult Blood Large (A) Negative    Micro Urine Req Microscopic    ESTIMATED GFR   Result Value Ref Range    GFR If  55 (A) >60 mL/min/1.73 m 2    GFR If Non African American 45 (A) >60 mL/min/1.73 m 2   URINE MICROSCOPIC (W/UA)   Result Value Ref Range    WBC 2-5 (A) /hpf    RBC 20-50 (A) /hpf    Bacteria Few (A) None /hpf    Epithelial Cells Moderate (A) /hpf   EKG   Result Value Ref Range    Report       [facility] Emergency Dept.    Test Date:  2021  Pt Name:    AILYN MCINTOSH                 Department: ER  MRN:        [id number]                      Room:       RD 01  Gender:     Male                         Technician: [id number]  :        1955                   Requested By:ER TRIAGE PROTOCOL  Order #:    [id number]                     Reading MD:    Measurements  Intervals                                Axis  Rate:       189                          P:          0  NH:                                      QRS:        97  QRSD:       84                           T:          -56  QT:         264  QTc:        469    Interpretive Statements  SUPRAVENTRICULAR TACHYCARDIA  RIGHT AXIS DEVIATION  LOW VOLTAGE IN FRONTAL LEADS  REPOLARIZATION ABNORMALITY, PROB RATE RELATED  Compared to ECG 12/15/2020 21:50:54  Right-axis deviation now present  Low QRS voltage now present  Atrial fibrillation no longer present  Aberrant conduction of supraventricular beat(s) no longer  present     ISTAT ARTERIAL BLOOD GAS   Result Value Ref Range    Ph 7.336 (L) 7.400 - 7.500    Pco2 40.9 (H) 26.0 - 37.0 mmHg    Po2 308 (H) 64 - 87 mmHg    Tco2 23 20 - 33 mmol/L    S02 100 (H) 93 - 99 %    Hco3 21.9 17.0 - 25.0 mmol/L    BE -4 -4 - 3 mmol/L    Body Temp 100.4 F degrees    O2 Therapy 100 %    iPF Ratio 308     Ph Temp Huyen 7.322 (L) 7.400 - 7.500    Pco2 Temp Co 42.7 (H) 26.0 - 37.0 mmHg    Po2 Temp Cor 313 (H) 64 - 87 mmHg    Specimen Arterial     Action Range Triggered NO     Inst. Qualified Patient YES      All labs reviewed by me.    EKG  12 Lead EKG interpreted by me to show SVT at a rate of 190. No P waves. Normal QRS. Normal ST segments. Normal T waves. Normal R-Wave progression. Abnormal EKG.     RADIOLOGY  EC-ECHOCARDIOGRAM COMPLETE W/O CONT         DX-CHEST-PORTABLE (1 VIEW)   Final Result      Endotracheal tube in place as noted above. Stable cardiopulmonary appearance.      DX-CHEST-PORTABLE (1 VIEW)   Final Result      Enlarged cardiac silhouette status post cardiac surgery with small right-sided pleural effusion and vascular congestion.      DX-CHEST-PORTABLE (1 VIEW)    (Results Pending)   DX-CHEST-PORTABLE (1 VIEW)    (Results Pending)     The radiologist's interpretation of all radiological studies have been reviewed by me.    Intubation  Procedure Note    Indication: Respiratory failure and impending respiratory failure    Consent: The patient provided verbal consent for this procedure.    Medications Used: succinycholine 150 mg intravenously    Procedure: The patient was placed in the appropriate position.  Cricoid pressure was not required.  Intubation was performed by direct laryngoscopy using a laryngoscope and an 8.0 cuffed endotracheal tube.  The cuff was then inflated and the tube was secured appropriately at a distance of 23 cm to the dental ridge.  Initial confirmation of placement included bilateral breath sounds.  A chest x-ray to verify correct placement of the tube showed appropriate tube position.    The patient tolerated the procedure well.     Complications: None      COURSE & MEDICAL DECISION MAKING  Pertinent Labs & Imaging studies reviewed. (See chart for details) The patient's [facility] and past medical records were reviewed    4:14 PM - Patient seen and examined at bedside. Will start sepsis protocol on the patient. I discussed that we will try to fix his heart rate with medication. However, if unsuccessful we will cardiovert. Additionally, I infomred the patient he will need to be treated with antibiotics for the cellulitis in his legs. Due to the patient's histoory of heart failure, we will obtain a BNP. Patient will be treated with Cardizem 25 mg, Unasyn 3 g, and Vancocin 2,750 mg. Ordered DX-Chest, EKG, Lactate, COV-2, Flue A/B and RSV, PT/INR, PTT, CBC w/ diff, CMP, UA, pro-BNP, Urine culture, and blood cluture to evaluate his symptoms. The differential diagnoses include but are not limited to: Sepsis, pneumonia, COVID-19    4:33 PM - Patient will be treated with Adenosine 6 mg. Patient's heart rate is not improved after medication. Will plan to consciously sedate for cardioversion.    4:51 PM - Paged cardiology.     5:19 PM - Patient will be treated with a second dose of Cardizem 25 mg and a diltiazem drip and started  with heparin drip.    5:28 PM I discussed the patient's case and the above findings with Dr. Galindo (Cardiology) who will see the patient.  Patient's A. fib is very much improved after being on a diltiazem drip however his respiratory status continued to decline    6:01 PM - Patient was requesting pain medication for his leg. He will be treated with Zofran 4 mg. Spoke with respiratory and will be started on Bipap.    6:41 PM - Reviewed patient's lab. Paged hospitalist and intensivist.     6:44 PM - Spoke to respiratory who states the patient is not tolerating bipap. I discussed that we will likely need to intubate the patient due to the severity of his condition.      7:02 PM I discussed the patient's case and the above findings with Dr. Hein (intensivist) who agrees with admission to the ICU and intubation.    7:03 PM - Intubation procedure preformed at this time (see procedure note above for details). Patient will be started on propofol.    7:13 PM - I discussed the patient's case and the above findings with Dr. Williamson (Hospitalist) who agreed to evaluate patient for hospitalization. Patients care will be transferred at this time.     7:18 PM nursing altered the patient is tachycardic and hypertensive and starting to move despite sedation. Will increase propofol and treat with fentanyl 100 mg.     CRITICAL CARE  The very real possibilty of a deterioration of this patient's condition required the highest level of my preparedness for sudden, emergent intervention.  I provided critical care services, which included medication orders, frequent reevaluations of the patient's condition and response to treatment, ordering and reviewing test results, and discussing the case with various consultants.  The critical care time associated with the care of the patient was 45 minutes. Review chart for interventions. This time is exclusive of any other billable procedures.     DISPOSITION:  Patient will be hospitalized by   Teri in critical condition.      FINAL IMPRESSION  1. Acute respiratory failure, unspecified whether with hypoxia or hypercapnia (HCC)    2.  Sepsis  3.  Cellulitis  4.  Respiratory failure  5.  Intubation  6.  Critical care, 45 minutes  7.  A. fib with rapid ventricular rate     Zeinab JARRETT (Scribjody), am scribing for, and in the presence of, Esequiel Colindres M.D..    Electronically signed by: Zeinab Ambriz (Angelikaibjody), 2/4/2021    IEsequiel M.D. personally performed the services described in this documentation, as scribed by Zeinab Ambriz in my presence, and it is both accurate and complete. C.    The note accurately reflects work and decisions made by me.  Esequiel Colindres M.D.  2/4/2021  10:48 PM

## 2021-02-05 NOTE — PROGRESS NOTES
Patient arrived to S120 at 2015 on tx ventilator accompanied by ACLS RN & RT. Patient on transport monitor.  Patient transferred to ICU bed without incident. ICU monitor placed.        2 RN skin assessment completed.   RLE cellulitis   Scab to L posterior calf   Bruise to R shoulder  Redness to penis & IAD to sacrum  Standard skin precautions in place.    No other areas of skin breakdown noted.

## 2021-02-05 NOTE — THERAPY
Occupational Therapy   Initial Evaluation     Patient Name: Morales Olivier  Age:  65 y.o., Sex:  male  Medical Record #: [id number]  Today's Date: 2/5/2021     Precautions  Precautions: (P) Fall Risk    Assessment  Patient is 65 y.o. male with a diagnosis of Acute resp failure with hypoxia. Pt currently limited by decreased functional mobility, activity tolerance, cognition,  strength, AROM, coordination, balance, and pain which are affecting pt's ability to complete ADLs/IADLs at baseline. Pt would benefit from OT services in the acute care setting to maximize functional recovery.     Plan    Recommend Occupational Therapy 3 times per week until therapy goals are met for the following treatments:  Self Care/Activities of Daily Living and Therapeutic Activities.       Discharge Recommendations: (P) Recommend post-acute placement for additional occupational therapy services prior to discharge home        02/05/21 1034   Prior Living Situation   Prior Services Meals on Wheels   Housing / Facility 1 Story Apartment / Condo   Steps Into Home 0   Steps In Home 0   Equipment Owned Front-Wheel Walker;Single Point Cane   Lives with - Patient's Self Care Capacity Alone and Able to Care For Self   Prior Level of ADL Function   Self Feeding Independent   Grooming / Hygiene Independent   Bathing Independent   Dressing Independent   Toileting Independent   ADL Assessment   Grooming Moderate Assist   Upper Body Dressing Maximal Assist   Lower Body Dressing Maximal Assist   Toileting Maximal Assist   Functional Mobility   Sit to Stand Maximal Assist  (x2)   Bed, Chair, Wheelchair Transfer Unable to Participate   Short Term Goals   Short Term Goal # 1 min A with UB dressing   Short Term Goal # 2 mod A with LB dressing   Short Term Goal # 3 mod A with ADl txfs

## 2021-02-05 NOTE — ASSESSMENT & PLAN NOTE
On telemetry, ventricularly paced rhythm   Amiodarone therapy started by cardiology.  Anticoagulation with warfarin.  Patient has pacemaker for sick sinus syndrome  As patient is on amiodarone, which can interfere with warfarin  Continue metoprolol

## 2021-02-05 NOTE — CARE PLAN
Problem: Ventilation Defect:  Goal: Ability to achieve and maintain unassisted ventilation or tolerate decreased levels of ventilator support  Outcome: NOT MET     Adult Ventilation Update    Total Vent Days: 2    Patient Lines/Drains/Airways Status      Active Airway       Name: Placement date: Placement time: Site: Days:    Airway ETT Oral 8.0  02/04/21   1903   Oral @ 26cm 2                    Vent Settings: CMV RR 22/ Vt 430/ PEEP 8/ O2 50%

## 2021-02-05 NOTE — RESPIRATORY CARE
Placed patient on BiPAP per MD order. Education provided, patient expressed understanding and demonstrates mask removal.

## 2021-02-05 NOTE — ED NOTES
MD's brought to the bedside, pt not tolerating bipap, pt okay with being intubated, intubation prep started

## 2021-02-05 NOTE — TELEPHONE ENCOUNTER
Sheeba from [facility] called to report she went to this patient's home to get INR but he was not there.  He did not answer his phone with no VM set up.    Brandi Howard, Clinical Pharmacist, CDE, CACP

## 2021-02-05 NOTE — ED TRIAGE NOTES
Pt arrives via ems from independent living facility after patient was found down on the ground for an unknown amount of time possibly 2-4 days. Pt arrives with a HR of 190, c/o SOB, and pain in the right leg which is very red and hot to the touch. Pt is alert and oriented x 4 gcs 15. Pt is breathing at a rate of ~50bpm. Hx of afib, mitral valve replacement, and chf. MD called to bedside.

## 2021-02-05 NOTE — PROGRESS NOTES
0115 paged Lawrence in regards to low blood pressure sustaining systolic in th 60s. orders received to initiate norepinephrine to maintain map > than 65. Informed MD that pt doesn't have a central line.

## 2021-02-05 NOTE — ASSESSMENT & PLAN NOTE
Atrial fibrillation likely due to sepsis/cellulitis  Converted to AF again overnight, repeat bolus and resumption of amiodarone drip  Heparin drip ongoing, probably can transition to warfarin and full dose Lovenox  Continue to optimize electrolytes  Continue oral amiodarone

## 2021-02-05 NOTE — ASSESSMENT & PLAN NOTE
Resolving  Severe Sepsis Present on admission, with respiratory failure and EBEN  Patient met 3/4 SIRS criteria on admission  Admission WBC 28.8 -> now 13.3  Source of infection is left lower extremity cellulitis  Sepsis protocol initiated.  Patient received IV fluids and antibiotics  Patient no longer shows signs of endorgan damage  Echo and KD normal, no vegetation  Continue penicillin G for group A strep bacteremia

## 2021-02-05 NOTE — CARE PLAN
Problem: Ventilation Defect:  Goal: Ability to achieve and maintain unassisted ventilation or tolerate decreased levels of ventilator support  Outcome: PROGRESSING SLOWER THAN EXPECTED   Adult Ventilation Update    Total Vent Days: 2      $ FVC / Vital Capacity (liters) : 0.6 (02/05/21 0801)  NIF (cm H2O) : -15 (02/05/21 0801)  Rapid Shallow Breathing Index (RR/VT): 74 (02/05/21 0801)    Sputum/Suction   Cough: Productive (02/05/21 1435)  Sputum Amount: Small (02/05/21 1435)  Sputum Color: Yellow;Tan (02/05/21 1435)  Sputum Consistency: Thick (02/05/21 1435)    Mobility  Activity Performed: Unable to mobilize (02/04/21 2200)  Reason Not Mobilized: Unstable condition(unstable vitals on pressors) (02/04/21 2200)    Events/Summary/Plan: back to cmv, no extubation per MD (02/05/21 0801)

## 2021-02-05 NOTE — ED NOTES
40mg propofol IVP given with 150mg of succ for intubation, followed by propofol at 20 mcg/kg/min.

## 2021-02-05 NOTE — ASSESSMENT & PLAN NOTE
Blood cultures 2/4 grew GAS  ID following.  Recommends 14 days of antibiotics from 2/6- 2/20 with ceftriaxone and Flagyl, as blood cell count has remained high despite adequate IV antibiotics.  Patient will need a midline on discharge  CT leg ordered to evaluate for muscle involvement of infection  Blood cultures, pro-Stephen, lactic acid ordered

## 2021-02-06 NOTE — ASSESSMENT & PLAN NOTE
Reassess with review of systems as well as observation for apnea once extubated  Bridge with PAP as needed post extubation  Consider continuous nocturnal oximetry study prior to discharge

## 2021-02-06 NOTE — PROGRESS NOTES
Cardiology Follow Up Progress Note    Date of Service  2/6/2021    Attending Physician  Gerald Williamson M.D.    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 Parnell on 3/8/2017, hypertension, DVT, type 2 diabetes.    Resides in an independent living.  He was found down on the ground for unknown amount of time  Found to have a Heart rate of 190 SVT, complaint of dyspnea, right leg pain, heart rate did not significantly improve with adenosine and diltiazem.  Worsening respiratory status status post intubation 2/4/2020 at 7 PM  Recent diagnosis of left lower extremity cellulitis, was hospitalized on 12/15/2020-12/24/2020.  MPI negative for ischemia 11/20  Normal cath in 2017      Interim Events  Remains paced rhythm/sinus on amiodarone  Remains intubated, sedated  Labs reviewed  Cultures positive for group A strep bacteremia  Underwent KD this morning to r/o valve vegetation  Leukocytosis, improving, WBC 14.7  Cr 1.26  K 3.8    Mag 2.4  Levophed off since 9 AM      Review of Systems  Review of Systems   Unable to perform ROS: Intubated       Vital signs in last 24 hours  Pulse:  [79-85] 80  Resp:  [19-27] 20  BP: ()/(56-77) 107/77  SpO2:  [95 %-99 %] 97 %    Physical Exam  Physical Exam  Constitutional:       Comments: Intubated, sedated   Cardiovascular:      Pulses: Normal pulses.      Comments: Paced rhythm    Trace lower extremity edema  Skin:     General: Skin is warm.      Comments: Right leg erythema, induration, consistent with cellulitis   Neurological:      Comments: Intubated sedated         Lab Review  Lab Results   Component Value Date/Time    WBC 14.7 (H) 02/06/2021 04:20 AM    RBC 3.74 (L) 02/06/2021 04:20 AM    HEMOGLOBIN 11.8 (L) 02/06/2021 04:20 AM    HEMATOCRIT 35.7 (L)  02/06/2021 04:20 AM    MCV 95.5 02/06/2021 04:20 AM    MCH 31.6 02/06/2021 04:20 AM    MCHC 33.1 (L) 02/06/2021 04:20 AM    MPV 11.8 02/06/2021 04:20 AM      Lab Results   Component Value Date/Time    SODIUM 136 02/06/2021 04:20 AM    POTASSIUM 3.8 02/06/2021 04:20 AM    CHLORIDE 105 02/06/2021 04:20 AM    CO2 20 02/06/2021 04:20 AM    GLUCOSE 194 (H) 02/06/2021 04:20 AM    BUN 37 (H) 02/06/2021 04:20 AM    CREATININE 1.26 02/06/2021 04:20 AM    CREATININE 1.4 04/27/2009 10:08 AM      Lab Results   Component Value Date/Time    ASTSGOT 117 (H) 02/04/2021 04:15 PM    ALTSGPT 31 02/04/2021 04:15 PM     Lab Results   Component Value Date/Time    CHOLSTRLTOT 126 11/20/2020 05:49 PM    LDL 61 11/20/2020 05:49 PM    HDL 22 (A) 11/20/2020 05:49 PM    TRIGLYCERIDE 217 (H) 11/20/2020 05:49 PM    TROPONINT 20 (H) 02/04/2021 04:15 PM       Recent Labs     02/04/21  1615   NTPROBNP 1818*       Cardiac Imaging and Procedures Review  EKG: SVT, rate 190    Echocardiogram:    2/4/2021  LVEF 45%  Known mitral valve bioprosthesis which is functioning normally  Moderate TR    Cardiac Catheterization:   Normal cath in 2008 & 2017 prior to mitral valve replacement    Imaging  Chest X-Ray:      Stress Test:    MPI 2020  No evidence of inferolateral reversible ischemia    Assessment/Plan      A. fib RVR in the setting of sepsis/bacteremia/history of A. fib  -Remains paced/sinus on amiodarone  -Continue amiodarone 400 mg p.o. twice daily x2 weeks, then 400 mg p.o. daily x2 weeks, then 200 mg daily.  -IV Heparin, transition to p.o. oral anticoagulation  -Currently paced rhythm, underlying sinus rhythm.      -Mildly depressed EF, 45%   -likely secondary to rapid heart rate, as well as substance        Acute respiratory failure with hypoxia  VDRF  -Plan for extubation this afternoon      Sepsis, cultures positive for group A strep bacteremia  -Recent history of left lower leg cellulitis 12/2020  -Now with right lower extremity erythema  consistent with cellulitis  -Currently on Zosyn  -Off of Levophed this morning, blood pressure stable        Known history of mitral valve replacement 2008 with redo in 2017  -Known mitral valve bioprosthesis which is functioning normally  -Underwent KD this morning to rule out valvular vegetation, results pending      [place] pacemaker in situ, 2014  -Appears to be functioning well  -Pacemaker interrogated yesterday and confirmed high-grade A. fib RVR    Follow-up with our cardiology office in 4 weeks, will arrange.  Thank you for allowing me to participate in the care of this patient.      Please contact me with any questions.    CON Cooper.   Cardiologist, [facility]  [phone]

## 2021-02-06 NOTE — CARE PLAN
Problem: Ventilation Defect:  Goal: Ability to achieve and maintain unassisted ventilation or tolerate decreased levels of ventilator support  Outcome: NOT MET     Ventilator Daily Summary     Vent Day # 3     8.0 @ 26     RR: 22  VT: 440  PEEP: 8  FiO2: 50%     Weaning trials: N/A     Plan: Continue current ventilator settings and wean mechanical ventilation as tolerated per physician orders.

## 2021-02-06 NOTE — PROGRESS NOTES
Critical Care Progress Note    Date of admission  2/4/2021    Chief Complaint  65 y.o. male who presented 2/4/2021 after being found down after a fall.  Pt complained of chest pain and SOB.  Pt found to be in a fib with RVR and had right leg cellulitis.  Pt was working hard to breathe and ER doctor thought it was beneficial to intubate pt prior to respiratory failure.    Hospital Course  2/5 - Vent, NE drip, heparin drip, AF -> AR, AMIO IV to PO, BC + Strep? Zosyn/Vanco      Interval Problem Update  Reviewed last 24 hour events:    Sedate on vent  Follows  DEX 0.5  FENT pops  V#3 50%-> 30%, PEEP 8  ABG 7.36/34/123  CXR  R base slt better  Paced 80  SBp 90s  NE 2  Amio IV -> PO  UO adequate, ARB + 6.1L  B/c 37/1.26 - better  Tm 101.7  WBC 14.7 better  Leg no change/slt better  BC Strep species 1/2  MRSA nares neg  Vanco/Zosyn  SSI 12 med  Heparin drip  Plt 119    Replete Phos  De-escalate ABX to Unasyn if not VRE  Lasix?  Up to High SSI    Initial SBT not successful, patient perhaps a bit too somnolent    KD performed by Dr. Cedeno, no evidence of endocarditis  Blood culture positive for Streptococcus pyogenes (group A)  We will consult ID for antibiotic treatment duration in the face of 2 bioprosthetic valves and bacteremia    Follow-up SBT with good weaning parameters and LOC better  Liberation trial       YESTERDAY  Sedate on vent, withdraws, not following  DEX 0.5  SVT/AF -> SR 80s, PVCs  SBp 90-120s  DILT off  NE drip 10 -> 6  Heparin drip (M&Ao valve)  UO adequate  ECHO AF, EF 45%, Severe LAE, MV prosthesis normal  V#2 50% PEEP 8  CXR better R base  Secretions small  ABG 7.39/34/135  Tm 102  WBC 26.9, slt better  LA 2.2 - better  BC strep?  Pepcid    Saline bolus  NIVT, rule out DVT  Mg/Phos  Mod SSi, I - Drip?  Place CVC    Patient remains on norepinephrine which were actively titrating  No response to two 1 L boluses, CVC to be placed    Noninvasive study negative for DVT  Case reviewed with Dr. Cedeno with  cardiology of the length at the bedside  Reviewed case with sister at length at the bedside by phone and obtain consent for CVC  CVC placed without difficulty, good position of catheter, no pneumothorax    Review of Systems  Review of Systems   Unable to perform ROS: Acuity of condition        Vital Signs for last 24 hours   Pulse:  [78-86] 86  Resp:  [19-27] 19  BP: ()/(56-77) 95/67  SpO2:  [95 %-100 %] 100 %    Hemodynamic parameters for last 24 hours       Respiratory Information for the last 24 hours  Vent Mode: Spont  Rate (breaths/min): 20  Vt Target (mL): 440  PEEP/CPAP: 8  P Support: 5  MAP: 11  Length of Weaning Trial (Hours): 1  Control VTE (exp VT): 452    Physical Exam   Physical Exam  Vitals signs reviewed.   Constitutional:       Appearance: He is obese. He is ill-appearing. He is not toxic-appearing.      Interventions: He is sedated, intubated and restrained.   HENT:      Head: Normocephalic and atraumatic.      Mouth/Throat:      Mouth: Mucous membranes are moist.   Eyes:      General: No scleral icterus.     Extraocular Movements: Extraocular movements intact.      Pupils: Pupils are equal, round, and reactive to light.   Neck:      Musculoskeletal: Neck supple. No neck rigidity.      Vascular: No JVD.   Cardiovascular:      Rate and Rhythm: Normal rate and regular rhythm.      Pulses: Normal pulses.      Heart sounds: Murmur present. No gallop.       Comments: SR  Pulmonary:      Effort: Pulmonary effort is normal. He is intubated.      Breath sounds: No wheezing, rhonchi or rales.   Abdominal:      General: Abdomen is protuberant. Bowel sounds are normal. There is no distension.      Palpations: Abdomen is soft. There is no fluid wave, hepatomegaly or splenomegaly.      Tenderness: There is no abdominal tenderness. There is no right CVA tenderness, left CVA tenderness or guarding. Negative signs include Hathaway's sign and McBurney's sign.   Genitourinary:     Comments:  Fouzia  Musculoskeletal:      Right lower leg: Edema present.      Left lower leg: Edema present.   Lymphadenopathy:      Cervical: No cervical adenopathy.   Skin:     General: Skin is warm and dry.      Coloration: Skin is not cyanotic or mottled.      Findings: Erythema (Improved) and wound present.      Nails: There is no clubbing.        Comments: Right lower extremity from ankle to knee with increased erythema, increased warmth, tenderness and one small area of right lateral ankle where there is a cloudy fluid-filled blister that is about 4 mm x 10 mm all consistent with cellulitis   Neurological:      Mental Status: He is alert.      GCS: GCS eye subscore is 4. GCS verbal subscore is 1. GCS motor subscore is 6.      Cranial Nerves: Cranial nerves are intact.      Sensory: Sensation is intact.      Motor: Motor function is intact.      Comments: More alert, following better   Psychiatric:      Comments: Unable to assess         Medications  Current Facility-Administered Medications   Medication Dose Route Frequency Provider Last Rate Last Admin   • ampicillin/sulbactam (UNASYN) 3 g in  mL IVPB  3 g Intravenous Q6HRS Mckay Diaz M.D.   Stopped at 02/06/21 1830   • insulin regular (HumuLIN R,NovoLIN R) injection  3-14 Units Subcutaneous Q6HRS Mckay Diaz M.D.   Stopped at 02/06/21 1800    And   • dextrose 50% (D50W) injection 50 mL  50 mL Intravenous Q15 MIN PRN Mckay Diaz M.D.       • midazolam (Versed) 2 MG/2ML injection 1 mg  1 mg Intravenous Q HOUR PRN Josafat Cedeno M.D.   2 mg at 02/06/21 1105   • [START ON 2/20/2021] amiodarone (Cordarone) tablet 200 mg  200 mg Enteral Tube DAILY Mckay Diaz M.D.       • amiodarone (Cordarone) tablet 400 mg  400 mg Enteral Tube TWICE DAILY Mckay Diaz M.D.   400 mg at 02/06/21 1759   • morphine (pf) 4 mg/mL injection 2 mg  2 mg Intravenous Q HOUR PRN Mallory Osman M.D.   2 mg at 02/06/21 1742   • amiodarone (NEXTERONE) IVPB  150 mg  150 mg Intravenous Once John Hein, D.O.       • amiodarone (NEXTERONE) 360 mg/200 mL infusion  0.5-1 mg/min Intravenous Continuous John Hein, D.O.       • norepinephrine (Levophed) infusion 8 mg/250 mL (premix)  0-30 mcg/min Intravenous Continuous Deepak Bravo M.D.   Stopped at 02/06/21 1035   • Respiratory Therapy Consult   Nebulization Continuous RT Gerald Williamson M.D.       • ipratropium-albuterol (DUONEB) nebulizer solution  3 mL Nebulization Q2HRS PRN (RT) Gerald Williamson M.D.   3 mL at 02/06/21 1702   • famotidine (PEPCID) tablet 20 mg  20 mg Enteral Tube Q12HRS Gerald Williamson M.D.   20 mg at 02/06/21 1759    Or   • famotidine (PEPCID) injection 20 mg  20 mg Intravenous Q12HRS Gerald Williamson M.D.   20 mg at 02/05/21 0614   • senna-docusate (PERICOLACE or SENOKOT S) 8.6-50 MG per tablet 2 Tab  2 Tab Enteral Tube BID Gerald Williamson M.D.   2 Tab at 02/06/21 1759    And   • polyethylene glycol/lytes (MIRALAX) PACKET 1 Packet  1 Packet Enteral Tube QDAY PRN Gerald Williamson M.D.        And   • magnesium hydroxide (MILK OF MAGNESIA) suspension 30 mL  30 mL Enteral Tube QDAY PRN Gerald Williamson M.D.        And   • bisacodyl (DULCOLAX) suppository 10 mg  10 mg Rectal QDAY PRN Gerald Williamson M.D.       • MD Alert...ICU Electrolyte Replacement per Pharmacy   Other PHARMACY TO DOSE Gerald Williamson M.D.       • lidocaine (XYLOCAINE) 1 % injection 1-2 mL  1-2 mL Tracheal Tube Q30 MIN PRN Gerald Williamson M.D.       • lactated ringers infusion (BOLUS): BMI greater than 30  30 mL/kg (Ideal) Intravenous Once PRN Gerald Williamson M.D.       • lactated ringers infusion (BOLUS)  500 mL Intravenous Once PRN Gerald Williamson M.D.       • heparin infusion 25,000 units in 500 mL 0.45% NACL  0-30 Units/kg/hr (Adjusted) Intravenous Continuous Gerald Williamson M.D. 39.2 mL/hr at 02/06/21 1517 22 Units/kg/hr at 02/06/21 1517   • heparin injection 3,600 Units  40 Units/kg (Adjusted) Intravenous PRN Gerald Williamson M.D.   3,600 Units at 02/06/21 0458   •  NS infusion   Intravenous Continuous John Hein, D.O. 75 mL/hr at 02/06/21 1400 Rate Change at 02/06/21 1400   • acetaminophen (Tylenol) tablet 650 mg  650 mg Enteral Tube Q6HRS PRN John Hein, D.O.   650 mg at 02/05/21 1406   • labetalol (NORMODYNE/TRANDATE) injection 10 mg  10 mg Intravenous Q4HRS PRN John Hein, D.O.       • ondansetron (ZOFRAN) syringe/vial injection 4 mg  4 mg Intravenous Q4HRS PRN John Hein, D.O.       • ondansetron (ZOFRAN ODT) dispertab 4 mg  4 mg Oral Q4HRS PRN John Hein, D.O.           Fluids    Intake/Output Summary (Last 24 hours) at 2/6/2021 1918  Last data filed at 2/6/2021 1400  Gross per 24 hour   Intake 3921.64 ml   Output 815 ml   Net 3106.64 ml       Laboratory  Recent Labs     02/04/21  1950 02/05/21  0535 02/06/21  0445   ISTATAPH 7.336* 7.414 7.368*   ISTATAPCO2 40.9* 32.3 32.8   ISTATAPO2 308* 126* 120*   ISTATATCO2 23 22 20   [id number] 100* 99 99   ISTATARTHCO3 21.9 20.7 18.9   ISTATARTBE -4 -3 -6*   ISTATTEMP 100.4 F 101.1 F 99.7 F   ISTATFIO2 100 50 40   ISTATSPEC Arterial Arterial Arterial   ISTATAPHTC 7.322* 7.393* 7.359*   [id number] 313* 135* 123*         Recent Labs     02/04/21  1615 02/05/21  0550 02/06/21  0420   SODIUM 137 130* 136   POTASSIUM 5.3 4.3 3.8   CHLORIDE 102 98 105   CO2 19* 21 20   BUN 35* 47* 37*   CREATININE 1.54* 1.78* 1.26   MAGNESIUM  --  1.8 2.4   PHOSPHORUS  --  1.8* 2.2*   CALCIUM 8.5 8.0* 7.3*     Recent Labs     02/04/21 1615 02/05/21  0550 02/06/21  0420   ALTSGPT 31  --   --    ASTSGOT 117*  --   --    ALKPHOSPHAT 69  --   --    TBILIRUBIN 1.0  --   --    GLUCOSE 209* 285* 194*     Recent Labs     02/04/21 1615 02/05/21  0550 02/06/21  0420   WBC 28.8* 26.9* 14.7*   NEUTSPOLYS 92.70* 91.80* 88.30*   LYMPHOCYTES 2.10* 3.10* 4.70*   MONOCYTES 3.10 3.40 5.00   EOSINOPHILS 0.40 0.00 0.10   BASOPHILS 0.50 0.30 0.40   ASTSGOT 117*  --   --    ALTSGPT 31  --   --    ALKPHOSPHAT 69  --   --    TBILIRUBIN 1.0  --   --       Recent Labs     02/04/21  1615 02/05/21  0550 02/06/21  0420   RBC 4.45* 3.97* 3.74*   HEMOGLOBIN 14.4 12.7* 11.8*   HEMATOCRIT 43.2 38.7* 35.7*   PLATELETCT 175 165 119*   PROTHROMBTM 19.9*  --   --    APTT 40.2*  --   --    INR 1.64*  --   --        Imaging  X-Ray:  I have personally reviewed the images and compared with prior images.  EKG:  I have personally reviewed the images and compared with prior images.  Echo:   Reviewed    Assessment/Plan  * Acute respiratory failure with hypoxia (HCC)- (present on admission)  Assessment & Plan  Intubated in ER for respiratory distress 2/4  Combination of A. fib RVR and valvular heart disease with sepsis from right lower extremity cellulitis  RT protocols  Ventilator bundle  Serial ABG/chest x-ray/ventilator mechanics review  Daily SATs  SBT liberation trial possible extubation later today  Echo performed and patient not in profound failure although component of CHF may have contributed to failure  Chest x-ray with subtle right-sided abnormalities, monitor for atelectasis/pneumonia/aspiration pneumonia/KYLAH  Forced diuresis as clinically appropriate, currently titrating norepinephrine for hypotension      Cellulitis of right lower extremity- (present on admission)  Assessment & Plan  Classic right lower extremity changes consistent with cellulitis-no change  Blood culture x1+ for strep species?-Growing Streptococcus pyogenes  Vancomycin and Zosyn initiated in ER, de-escalate as appropriate  May need to transition to linezolid if creatinine gets any higher  Consult ID evaluation with mechanical valves and positive blood cultures a started bug [facility], 1 patient crushed me  Echocardi if you could wait on type and duration for S120 and change antibiotics now if you would like and then see tomorrow or even have somebody else see Monday that would be great.    Antibiotics Mr. Aceves has right lower extremity cellulitis with sepsis coming off pressors and the ventilator and 1 of  2 blood cultures positive for Streptococcus pyogenes with initial treatment Vanco Zosyn, KD today negative for endocarditis he has 2 bioprosthetic valves, I told cardiology I would call you but I have been delayed.    Thanksogram noted   timewise,Noninvasive study negative for DVT bilaterally in the lower extremities 2/5      Sepsis ([facility])- (present on admission)  Assessment & Plan  This is Septic shock Present on admission  SIRS criteria identified on my evaluation include: Tachycardia, with heart rate greater than 90 BPM, Tachypnea, with respirations greater than 20 per minute and Leukocytosis, with WBC greater than 12,000  Source is cellulitis  Presentation includes: Severe sepsis present and persistent hypotension after 30 ml/kg completed.   Despite appropriate fluid resuscitation with crystalloid given per sepsis guidelines, the patient remains hypotensive with systolic blood pressure less than 90 or MAP less than 65  Hemodynamic support with additional fluids and IV vasopressors as needed to maintain a SBP of 90 or MAP of 65  IV antibiotics as appropriate for source of sepsis  Reassessment: I have reassessed the patient's hemodynamic status    Right lower extremity cellulitis my exam  1 blood culture growing Streptococcus pyogenes  ID consult for probable extended course of antibiotics  KD negative for endocarditis per Dr. Cedeno 2/6  Actively titrating norepinephrine, CVC placed  Status post repeated 1 L boluses  Echocardiogram with normal aortic and mitral valve function (both are artificial valves)  Hypotension persisting despite conversion to sinus rhythm with amiodarone      Atrial fibrillation with RVR ([facility])- (present on admission)  Assessment & Plan  Atrial fibrillation likely due to sepsis/cellulitis  Converted to sinus rhythm with amiodarone bolus and infusion overnight  Heparin drip ongoing  Optimize electrolytes  Transition to oral amiodarone when okay with cardiology      Lactic acid acidosis-  (present on admission)  Assessment & Plan  Secondary to sepsis, improved    COPD (chronic obstructive pulmonary disease) (HCC)- (present on admission)  Assessment & Plan  History of COPD  Not overly bronchospastic or exacerbating at this time  RT protocols  Nebulizer treatments 4 times daily and as needed  Update vaccines prior to discharge    Paroxysmal atrial fibrillation (HCC)- (present on admission)  Assessment & Plan  Status post PAF, currently paced rhythm at 80  Anticoagulated with heparin infusion  Amiodarone drip being converted to oral amiodarone  Optimize electrolytes  TT echocardiogram noted, KD noted  Cardiology following    Pacemaker- (present on admission)  Assessment & Plan  Cardiac monitoring, pacing at 80  Optimize electrolytes  Cardiology following    Type 2 diabetes mellitus without complication, without long-term current use of insulin (HCC)- (present on admission)  Assessment & Plan  Increased from medium to high sliding scale  Monitor for the need to transition to insulin continuous infusion  Serial blood sugar testing  Hypoglycemia protocols    ESTELA (obstructive sleep apnea)- (present on admission)  Assessment & Plan  Reassess with review of systems as well as observation for apnea once extubated  Bridge with PAP as needed post extubation    H/O mitral valve and aortic valve replacement- (present on admission)  Assessment & Plan  Echocardiogram 2/4 revealed functioning prosthetic mitral and aortic valves  Cardiology following  Consider ID consultation pending results of blood cultures    Anticoagulated- (present on admission)  Assessment & Plan  Heparin drip for PAF, cardiology following       VTE:  Heparin  Ulcer: H2 Antagonist  Lines: Central Line  Ongoing indication addressed and Fragoso Catheter  Ongoing indication addressed    I have performed a physical exam and reviewed and updated ROS and Plan today (2/6/2021). In review of yesterday's note (2/5/2021), there are no changes except as  documented above.     Discussed patient condition and risk of morbidity and/or mortality with Family, RN, RT, Pharmacy, Charge nurse / hot rounds and cardiology    The patient remains critically ill.  Actively titrating norepinephrine.  IV amiodarone and heparin being transitioned.  Patient on full vent support but liberation trial possible today.  Critical care time = 40 minutes in directly providing and coordinating critical care and extensive data review.  No time overlap and excludes procedures.

## 2021-02-06 NOTE — ASSESSMENT & PLAN NOTE
This is Septic shock Present on admission  SIRS criteria identified on my evaluation include: Tachycardia, with heart rate greater than 90 BPM, Tachypnea, with respirations greater than 20 per minute and Leukocytosis, with WBC greater than 12,000  Source is cellulitis  Presentation includes: Severe sepsis present and persistent hypotension after 30 ml/kg completed.   Despite appropriate fluid resuscitation with crystalloid given per sepsis guidelines, the patient remains hypotensive with systolic blood pressure less than 90 or MAP less than 65  Hemodynamic support with additional fluids and IV vasopressors as needed to maintain a SBP of 90 or MAP of 65  IV antibiotics as appropriate for source of sepsis  Reassessment: I have reassessed the patient's hemodynamic status    Right lower extremity cellulitis my exam, no change  1 blood culture growing Streptococcus pyogenes, repeat blood cultures x2  ID consult noted  KD negative for endocarditis per Dr. Cedeno 2/6  Norepinephrine weaned off  Still in and out of A. fib on amiodarone  Echocardiogram with normal aortic and mitral valve function (both are artificial valves)  Fluid resuscitation complete

## 2021-02-06 NOTE — CARE PLAN
Problem: Communication  Goal: The ability to communicate needs accurately and effectively will improve  Outcome: PROGRESSING AS EXPECTED  Note: Nods yes and no appropriately     Problem: Pain Management  Goal: Pain level will decrease to patient's comfort goal  Outcome: PROGRESSING AS EXPECTED  Intervention: Follow pain managment plan developed in collaboration with patient and Interdisciplinary Team  Note: Shakes head no to pain     Problem: Safety - Medical Restraint  Goal: Remains free of injury from restraints (Restraint for Interference with Medical Device)  Description: INTERVENTIONS:  1. Determine that other, less restrictive measures have been tried or would not be effective before applying the restraint  2. Evaluate the patient's condition at the time of restraint application  3. Inform patient/family regarding the reason for restraint  4. Q2H: Monitor safety, psychosocial status, comfort, nutrition and hydration  Outcome: PROGRESSING AS EXPECTED

## 2021-02-06 NOTE — ASSESSMENT & PLAN NOTE
Cardiac monitoring, pacing at 80 when atrial fibrillation controlled with amiodarone  Continue to optimize electrolytes  Cardiology following

## 2021-02-06 NOTE — THERAPY
Physical Therapy   Initial Evaluation     Patient Name: Morales Olivier  Age:  65 y.o., Sex:  male  Medical Record #: [id number]  Today's Date: 2/5/2021     Precautions: Fall Risk    Assessment  Pt presents with c/c of SOB, found to have afib with RVR requiring intubation for respiratory failure. Currently, demonstrated equally strong all extremities, able to stand and initiate stepping; HR at 80 throughout, PPM dependent;  currently would need placement however very early in plan of care and will continue to follow to progress.     Plan    Recommend Physical Therapy 3 times per week until therapy goals are met for the following treatments:  Bed Mobility, Equipment, Gait Training, Manual Therapy, Neuro Re-Education / Balance, Self Care/Home Evaluation, Sensory Integration Techniques, Stair Training, Therapeutic Activities and Therapeutic Exercises    DC Equipment Recommendations: Unable to determine at this time  Discharge Recommendations: Recommend post-acute placement for additional physical therapy services prior to discharge home       Abridged Subjective/Objective       02/05/21 1035   Prior Living Situation   Prior Services Meals on Wheels   Housing / Facility Assisted Living Residence   Steps Into Home 0   Steps In Home 0   Lives with - Patient's Self Care Capacity Alone and Able to Care For Self   Comments all social per chart; pt unable to clarify; H and P reporting found down at nursing home after 2-4 days    Prior Level of Functional Mobility   Bed Mobility Unable To Determine At This Time   History of Falls   History of Falls Yes   Cognition    Cognition / Consciousness X   Level of Consciousness Alert   Attention Impaired   Comments cooperative; initially sleepy but has fentaynl running; appropriate once alert, eyes open and following 2 step commands    Passive ROM Lower Body   Passive ROM Lower Body WDL   Strength Lower Body   Lower Body Strength  X   Comments kicks both LEs against gravity, no focal deficits  noted    Sensation Lower Body   Lower Extremity Sensation   WDL   Neurological Concerns   Neurological Concerns No   Balance Assessment   Sitting Balance (Static) Fair -   Sitting Balance (Dynamic) Poor +   Standing Balance (Static) Trace +   Standing Balance (Dynamic) Trace   Weight Shift Sitting Fair   Weight Shift Standing Poor   Comments B UE support in sitting/standing; holding trunk balance toward end of session without assist    Gait Analysis   Gait Level Of Assist Unable to Participate   Comments able to take one facilitated side step   Bed Mobility    Supine to Sit Total Assist   Sit to Supine Total Assist   Scooting Maximal Assist   Functional Mobility   Sit to Stand Maximal Assist  (of 2 )   Bed, Chair, Wheelchair Transfer Unable to Participate   Short Term Goals    Short Term Goal # 1 Pt will perform supine<>sit from flat HOB/no railing with supervision within 6 visits to ensure independnet mobility at home.    Short Term Goal # 2 Pt will perform sit<>stand with FWW and supervision within 6 visits to ensure independent moblity at home.    Short Term Goal # 3 Pt will ambulate x 150ft with FWW and supervision within 6 visits to ensure independent mobility at home.

## 2021-02-06 NOTE — CARE PLAN
"  Problem: Safety  Goal: Will remain free from falls  Outcome: PROGRESSING AS EXPECTED - Pt will remain free from falls this shift. Bed in lowest position, alarm in place at all times. Frequent visual checks performed to ensure pt safety and safe environment.     Problem: Venous Thromboembolism (VTW)/Deep Vein Thrombosis (DVT) Prevention:  Goal: Patient will participate in Venous Thrombosis (VTE)/Deep Vein Thrombosis (DVT)Prevention Measures  Outcome: PROGRESSING AS EXPECTED - Pt cureently on Heparin gtt, therapeutic x1. Armando LE-US showed no signs of DVT. SCD's off r/t LE edema.     Problem: Respiratory:  Goal: Respiratory status will improve  Outcome: PROGRESSING AS EXPECTED - Pt remains sedated and intubated toay per MD. Will begin weaning as zee when OK received from MD     Problem: Pain Management  Goal: Pain level will decrease to patient's comfort goal  Outcome: PROGRESSING AS EXPECTED - Pt shakes head \"No\" when asked if in pain. CPOT 0. Will continue to frequently assess pt for need of Pain management.     Problem: Safety - Medical Restraint  Goal: Free from restraint(s) (Restraint for Interference with Medical Device)  Description: INTERVENTIONS:  1. ONCE/SHIFT or MINIMUM Q12H: Assess and document the continuing need for restraints  2. Q24H: Continued use of restraint requires LIP to perform face to face examination and written order  3. Identify and implement measures to help patient regain control  Outcome: PROGRESSING AS EXPECTED - Pt continues to require use of restraints r/t being intubated. Will continue to frequently reassess for need of restrains and will discontinue once pt is appropriate.     "

## 2021-02-06 NOTE — ASSESSMENT & PLAN NOTE
History of COPD  Not overly bronchospastic or exacerbating at this time  RT protocols  Nebulizer treatments 4 times daily and as needed  Update vaccines prior to discharge

## 2021-02-06 NOTE — PROCEDURES
Central Line Insertion    Date/Time: 2/5/2021 5:08 PM  Performed by: Mckay Diaz M.D.  Authorized by: Mckay Diaz M.D.     Consent:     Consent obtained:  Verbal    Consent given by: Sister.    Risks discussed:  Bleeding, infection, pneumothorax and arterial puncture    Alternatives discussed:  Delayed treatment  Pre-procedure details:     Hand hygiene: Hand hygiene performed prior to insertion      Sterile barrier technique: All elements of maximal sterile technique followed      Skin preparation:  ChloraPrep    Skin preparation agent: Skin preparation agent completely dried prior to procedure    Sedation:     Sedation type:  Moderate (conscious) sedation  Anesthesia:     Anesthesia method:  None  Procedure details:     Location:  R internal jugular    Patient position:  Trendelenburg    Procedural supplies:  Triple lumen    Catheter size:  7 Fr    Landmarks identified: yes      Ultrasound guidance: yes      Sterile ultrasound techniques: Sterile gel and sterile probe covers were used      Number of attempts:  1    Successful placement: yes    Post-procedure details:     Post-procedure:  Dressing applied and line sutured    Assessment:  Blood return through all ports and free fluid flow    Patient tolerance of procedure:  Tolerated well, no immediate complications  Comments:      CXR pending, wire seen in vein in two views via U/s, line placed for hypotension requiring pressors

## 2021-02-06 NOTE — ASSESSMENT & PLAN NOTE
Continue high sliding scale  Monitor for the need to transition to insulin continuous infusion  Monitor for need for Lantus therapy especially as he starts taking p.o.  Serial blood sugar testing, AC/at bedtime  Hypoglycemia protocols

## 2021-02-06 NOTE — ASSESSMENT & PLAN NOTE
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

## 2021-02-06 NOTE — ASSESSMENT & PLAN NOTE
Recurrent PAF, back in A. fib on IV amiodarone, consider deseeding IV amiodarone after current bag  Continue anticoagulation with heparin, transition to subcu Lovenox full dose and oral warfarin  Continue oral amiodarone overlapping with IV  Continue optimize electrolytes, oxygenation and acid-base balance  TT echocardiogram noted, KD noted-no evidence of endocarditis, mechanical valves functioning normally  Cardiology following, reviewed with Dr. Cedeno

## 2021-02-07 NOTE — PROGRESS NOTES
Patient laterally transferred to T617, bedside report given to Dina AKERS. Belongings with patient include wallet, cell phone and keys

## 2021-02-07 NOTE — PROGRESS NOTES
2 RN Skin Assessment Completed by Dina Lynn, RN and Vasquez RN.    Head: N/A  Ears: Pink/ intact  Nose: Small pink/red area in R nostril   Mouth: Upper and lower lips dry, cracked, and scabbed. Dry black scabs noted. Picture in chart.    Neck: N/A   Breasts/Chest: N/A  Shoulder Blades: N/A  Spine: N/A  (R) Arm/Elbow/hand: Large bruise on R arm   (L) Arm/Elbow/hand: scattered bruising   Abdomen: N/A  Groin:Moisture fissures noted   Sacrum/Coccyx/Buttocks: DTI on sacrum. Mepilex applied   (R) Leg: Red, hot, Swollen, blistered and wheaping. Picture in chart.   (L) Leg: Small scabbed area on back of calf. Dry and intact, black.   (R) Heel/Foot/Toe: Red, hot, Swollen, blistered and wheaping. Picture in chart.           Devices in place: Nasal cannula, grover     Interventions in place: Mepilex to sacrum, treatment for RLE cellulitis in place, grey foam pieces behind ears for NC, Q2 turns     Possible skin injury found: N/A    Pictures uploaded into Epic: yes   Wound Consult Placed: New consult placed for lips and RLE

## 2021-02-07 NOTE — RESPIRATORY CARE
Extubated pt per MD. Positive cuff leak on deflation. Pt extubated to 3lpm NC. No stridor, no complications.

## 2021-02-07 NOTE — PROGRESS NOTES
Tech from Lab called with critical result of +BC Group A Strep at 1044. Critical lab result read back to Tech.   Dr. Cedeno and Dr. Diaz notified of critical lab result at 1100.  Critical lab result read back by Dr. Cedeno and Dr. Diaz.

## 2021-02-07 NOTE — CONSULTS
INFECTIOUS DISEASES INPATIENT CONSULT NOTE     Date of Service: 2/7/2021    Consult Requested By: Mckay Diaz M.D.    Reason for Consultation: Septic shock, right lower extremity cellulitis and group A streptococcal bacteremia    History of Present Illness:   Morales Olivier is a 65 y.o. man with history of coronary artery disease, COPD, mitral valve replacement with porcine valve in March 2017, pacemaker placement, hypertension and type 2 diabetes admitted 2/4/2021 after being found down.  Patient states that he sustained a fall prior to admission.  He does not recall any prior chest pain, dizziness or shortness of breath.  Patient states that he believes he fell out of bed.  He denies hitting his head.  He had noted right lower extremity swelling and erythema approximately 1 week prior to admission but denies any recent trauma or injury.  He denies any recent open sores or skin breakdown. On presentation, patient was febrile with a T-max of 101.5 was a significant leukocytosis of 28.8.  He was found to be in septic shock secondary to severe right lower extremity cellulitis.  Patient was subsequently transferred to the ICU, intubated and started on vasopressors.  Doppler ultrasound was negative for DVT.  1 blood culture set from 2/5 now growing group A streptococcus.  Repeat blood cultures from 2/6 are negative to date.  Given his known mitral valve replacement, he underwent a KD without evidence of prosthetic or native valve infective endocarditis.  Patient extubated overnight and weaned off of pressors.  He is currently on Unasyn.  Infectious disease service consulted for antibiotic recommendations    All other review of systems reviewed and negative except those documented above in the HPI.     PMH:   Past Medical History:   Diagnosis Date   • Atrial fibrillation (HCC)    • Back pain    • Bronchitis    • CAD (coronary artery disease)    • COPD (chronic obstructive pulmonary disease) ([facility])    • Gout       • Heart valve disease     mitral valve replacement (bovine)   • Hypertension    • Kidney stone    • Obesity    • Open wound 9/2/2009   • Pacemaker    • Personal history of venous thrombosis and embolism 2009    DVT right leg   • PVC (premature ventricular contraction) 4/13/2019   • Renal disorder     kidney stones   • Type II or unspecified type diabetes mellitus without mention of complication, not stated as uncontrolled     DM type II- diet controlled       PSH:  Past Surgical History:   Procedure Laterality Date   • MITRAL VALVE REPLACE  3/8/2017    Procedure: MITRAL VALVE REPLACE-REDO;  Surgeon: Cornelia Wright M.D.;  Location: SURGERY [facility];  Service:    • KD  3/8/2017    Procedure: KD;  Surgeon: Cornelia Wright M.D.;  Location: [facility];  Service:    • IRRIGATION & DEBRIDEMENT GENERAL  7/20/2009    Performed by MICHEL URBINA at [facility]   • WIDE EXCISION  7/20/2009    Performed by MICHEL URBINA at [facility]   • ANGIOGRAM  7/4/2009    Performed by MICHEL URBINA at [facility]   • CATH REMOVAL  7/4/2009    Performed by MICHEL URBINA at [facility]   • THROMBECTOMY  7/4/2009    Performed by MICHEL URBINA at [facility]   • EMBOLECTOMY  7/4/2009    Performed by MICHEL URBINA at [facility]   • ANGIOGRAM  7/3/2009    Performed by MORGAN WARD at SURGERY [facility]   • MITRAL VALVE REPLACE  3/14/08    Performed by CORNELIA WRIGHT at SURGERY [facility]   • MAZE PROCEDURE  3/14/08    Performed by CORNELIA WRIGHT at [facility]   • OTHER CARDIAC SURGERY  2008    mitral valve replacement   • AORTIC VALVE REPLACEMENT     • OTHER ABDOMINAL SURGERY      imbulica repair        FAMILY HX:  Family History   Problem Relation Age of Onset   • Diabetes Mother    • Lung Disease Father    • Heart Disease Neg Hx        SOCIAL HX:  Social History     Socioeconomic History   •  Marital status: Single     Spouse name: Not on file   • Number of children: Not on file   • Years of education: Not on file   • Highest education level: Not on file   Occupational History   • Not on file   Social Needs   • Financial resource strain: Not very hard   • Food insecurity     Worry: Never true     Inability: Never true   • Transportation needs     Medical: Yes     Non-medical: Yes   Tobacco Use   • Smoking status: Former Smoker     Packs/day: 2.50     Years: 9.00     Pack years: 22.50     Types: Cigarettes     Quit date: 1981     Years since quittin.1   • Smokeless tobacco: Never Used   Substance and Sexual Activity   • Alcohol use: No   • Drug use: Yes     Types: Marijuana   • Sexual activity: Not on file   Lifestyle   • Physical activity     Days per week: Not on file     Minutes per session: Not on file   • Stress: Not on file   Relationships   • Social connections     Talks on phone: Not on file     Gets together: Not on file     Attends Mandaeism service: Not on file     Active member of club or organization: Not on file     Attends meetings of clubs or organizations: Not on file     Relationship status: Not on file   • Intimate partner violence     Fear of current or ex partner: Not on file     Emotionally abused: Not on file     Physically abused: Not on file     Forced sexual activity: Not on file   Other Topics Concern   • Not on file   Social History Narrative   • Not on file     Social History     Tobacco Use   Smoking Status Former Smoker   • Packs/day: 2.50   • Years: 9.00   • Pack years: 22.50   • Types: Cigarettes   • Quit date: 1981   • Years since quittin.1   Smokeless Tobacco Never Used     Social History     Substance and Sexual Activity   Alcohol Use No       Allergies/Intolerances:  No Known Allergies    History reviewed with the patient     Other Current Medications:    Current Facility-Administered Medications:   •  potassium phosphate 15 mmol in  mL ivpb, 15  mmol, Intravenous, Once, Jak Sanchez M.D., Last Rate: 83.3 mL/hr at 02/07/21 0824, 15 mmol at 02/07/21 0824  •  ampicillin/sulbactam (UNASYN) 3 g in  mL IVPB, 3 g, Intravenous, Q6HRS, Mckay Diaz M.D., Stopped at 02/07/21 0650  •  insulin regular (HumuLIN R,NovoLIN R) injection, 3-14 Units, Subcutaneous, Q6HRS, Stopped at 02/06/21 1800 **AND** POC Blood Glucose, , , Q6H **AND** NOTIFY MD and PharmD, , , Once **AND** dextrose 50% (D50W) injection 50 mL, 50 mL, Intravenous, Q15 MIN PRN, Mckay Diaz M.D.  •  midazolam (Versed) 2 MG/2ML injection 1 mg, 1 mg, Intravenous, Q HOUR PRN, Josafat Cedeno M.D., 2 mg at 02/06/21 1105  •  [START ON 2/20/2021] amiodarone (Cordarone) tablet 200 mg, 200 mg, Enteral Tube, DAILY, Mckay Diaz M.D.  •  amiodarone (Cordarone) tablet 400 mg, 400 mg, Enteral Tube, TWICE DAILY, Mckay Diaz M.D., 400 mg at 02/07/21 0616  •  amiodarone (NEXTERONE) 360 mg/200 mL infusion, 0.5-1 mg/min, Intravenous, Continuous, John Hein, D.O., Last Rate: 17 mL/hr at 02/07/21 0449, 0.5 mg/min at 02/07/21 0449  •  oxyCODONE immediate-release (ROXICODONE) tablet 5-10 mg, 5-10 mg, Oral, Q3HRS PRN, John Hein, D.O., 10 mg at 02/07/21 0617  •  morphine (pf) 4 mg/mL injection 2 mg, 2 mg, Intravenous, Q HOUR PRN, Mckay Diaz M.D.  •  norepinephrine (Levophed) infusion 8 mg/250 mL (premix), 0-30 mcg/min, Intravenous, Continuous, Deepak Bravo M.D., Stopped at 02/06/21 1035  •  Respiratory Therapy Consult, , Nebulization, Continuous RT, Gerald Williamson M.D.  •  ipratropium-albuterol (DUONEB) nebulizer solution, 3 mL, Nebulization, Q2HRS PRN (RT), Gerald Williamson M.D., 3 mL at 02/07/21 0506  •  famotidine (PEPCID) tablet 20 mg, 20 mg, Enteral Tube, Q12HRS, 20 mg at 02/07/21 0617 **OR** famotidine (PEPCID) injection 20 mg, 20 mg, Intravenous, Q12HRS, Gerald Williamson M.D., 20 mg at 02/05/21 0614  •  senna-docusate (PERICOLACE or SENOKOT S) 8.6-50 MG per tablet 2  "Tab, 2 Tab, Enteral Tube, BID, 2 Tab at 21 0616 **AND** polyethylene glycol/lytes (MIRALAX) PACKET 1 Packet, 1 Packet, Enteral Tube, QDAY PRN **AND** magnesium hydroxide (MILK OF MAGNESIA) suspension 30 mL, 30 mL, Enteral Tube, QDAY PRN **AND** bisacodyl (DULCOLAX) suppository 10 mg, 10 mg, Rectal, QDAY PRN, Gerald Williamson M.D.  •  MD Alert...ICU Electrolyte Replacement per Pharmacy, , Other, PHARMACY TO DOSE, Gerald Williamson M.D.  •  lidocaine (XYLOCAINE) 1 % injection 1-2 mL, 1-2 mL, Tracheal Tube, Q30 MIN PRN, Gerald Williamson M.D.  •  lactated ringers infusion (BOLUS): BMI greater than 30, 30 mL/kg (Ideal), Intravenous, Once PRN, Gerald Williamson M.D.  •  lactated ringers infusion (BOLUS), 500 mL, Intravenous, Once PRN, Gerald Williamson M.D.  •  heparin infusion 25,000 units in 500 mL 0.45% NACL, 0-30 Units/kg/hr (Adjusted), Intravenous, Continuous, Gerald Williamson M.D., Last Rate: 42.7 mL/hr at 21, 24 Units/kg/hr at 21  •  heparin injection 3,600 Units, 40 Units/kg (Adjusted), Intravenous, PRN, Gerald Williamson M.D., 3,600 Units at 21  •  NS infusion, , Intravenous, Continuous, John Hein, D.O., Last Rate: 75 mL/hr at 21 025, New Bag at 21 025  •  acetaminophen (Tylenol) tablet 650 mg, 650 mg, Enteral Tube, Q6HRS PRN, John Rossiin, D.O., 650 mg at 21 1406  •  labetalol (NORMODYNE/TRANDATE) injection 10 mg, 10 mg, Intravenous, Q4HRS PRN, John Hein, D.O.  •  ondansetron (ZOFRAN) syringe/vial injection 4 mg, 4 mg, Intravenous, Q4HRS PRN, John Hein D.O., 4 mg at 21 0831  •  ondansetron (ZOFRAN ODT) dispertab 4 mg, 4 mg, Oral, Q4HRS PRN, SIMONE Maravilla.O.  [unfilled]    Most Recent Vital Signs:  /74   Pulse 83   Temp 36.7 °C (98 °F) (Tympanic)   Resp 20   Ht 1.778 m (5' 10\")   Wt 115 kg (253 lb 8.5 oz)   SpO2 96%   BMI 36.38 kg/m²   Temp  Av.7 °C (98 °F)  Min: 36.7 °C (98 °F)  Max: 36.7 °C (98 °F)    Physical Exam:  General: " Obese, well nourished, no diaphoresis, well-appearing, no acute distress  HEENT: sclera anicteric, PERRL, extraocular muscles intact, mucous membranes moist, oropharynx clear and moist, no oral lesions or exudate  Neck: supple, no lymphadenopathy.  Right IJ catheter  Chest: CTAB, no rales, rhonchi or wheezes, normal work of breathing.  Cardiac: regular rate and rhythm, normal S1 S2, +murmur but no rubs or gallops  Abdomen: + bowel sounds, soft, non-tender, non-distended, no hepatosplenomegaly  Extremities: WWP, 2+ pedal pulses, right lower extremity edema, erythema with superficial skin lesion draining serous fluid.  Skin: See above  Neuro: Alert and oriented times 3, non-focal exam, speech fluent, full range of motion to bilateral upper and lower extremities  Psych: normal mood and behavior, pleasant; memory intact, normal judgement    Pertinent Lab Results:  Recent Labs     02/05/21  0550 02/06/21  0420 02/07/21  0208   WBC 26.9* 14.7* 15.8*      Recent Labs     02/05/21  0550 02/06/21  0420 02/07/21  0208   HEMOGLOBIN 12.7* 11.8* 10.8*   HEMATOCRIT 38.7* 35.7* 32.8*   MCV 97.5 95.5 96.2   MCH 32.0 31.6 31.7   PLATELETCT 165 119* 124*         Recent Labs     02/05/21  0550 02/06/21  0420 02/07/21  0208   SODIUM 130* 136 139   POTASSIUM 4.3 3.8 3.6   CHLORIDE 98 105 108   CO2 21 20 21   CREATININE 1.78* 1.26 1.21        Recent Labs     02/04/21  1615   ALBUMIN 3.5        Pertinent Micro:  Results     Procedure Component Value Units Date/Time    URINE CULTURE(NEW) [[id number]] Collected: 02/04/21 2148    Order Status: Completed Specimen: Urine Updated: 02/07/21 0826     Significant Indicator NEG     Source UR     Site -     Culture Result No growth at 48 hours.    Narrative:      Indication for culture:->Evaluation for sepsis without a  clear source of infection  Have you been in close contact with a person who is suspected  or known to be positive for COVID-19 within the last 30 days  (e.g. last seen that person <  "30 days ago)->No  Indication for culture:->Evaluation for sepsis without a    BLOOD CULTURE [[id number]]  (Abnormal)  (Susceptibility) Collected: 02/04/21 1630    Order Status: Completed Specimen: Blood from Peripheral Updated: 02/07/21 0719     Significant Indicator POS     Source BLD     Site PERIPHERAL     Culture Result Growth detected by Bactec instrument. 02/05/2021  05:51      Streptococcus pyogenes (Group A)  This isolate does NOT demonstrate inducible Clindamycin  resistance in vitro.      Narrative:      CALL  Swain  19 tel. [phone],  CALLED  19 tel. [phone] 02/06/2021, 10:44, RB PERF. RESULTS CALLED TO:  [id number] RN  Per Hospital Policy: Only change Specimen Src: to \"Line\" if  specified by physician order.  No site indicated    Susceptibility     Streptococcus pyogenes (group a) (1)     Antibiotic Interpretation Microscan Method Status    Penicillin Sensitive 0.032 mcg/mL E-TEST Final    Cefotaxime Sensitive 0.023 mcg/mL E-TEST Final                   BLOOD CULTURE [[id number]] Collected: 02/06/21 2043    Order Status: Completed Specimen: Blood from Peripheral Updated: 02/07/21 0558     Significant Indicator NEG     Source BLD     Site PERIPHERAL     Culture Result No Growth  Note: Blood cultures are incubated for 5 days and  are monitored continuously.Positive blood cultures  are called to the RN and reported as soon as  they are identified.      Narrative:      Collected By:[id number] PRINCE CAS SKY  Per Hospital Policy: Only change Specimen Src: to \"Line\" if  specified by physician order.  Left Forearm/Arm    BLOOD CULTURE [[id number]] Collected: 02/06/21 2057    Order Status: Completed Specimen: Blood from Peripheral Updated: 02/07/21 0558     Significant Indicator NEG     Source BLD     Site PERIPHERAL     Culture Result No Growth  Note: Blood cultures are incubated for 5 days and  are monitored continuously.Positive blood cultures  are called to the RN and reported as soon as  they are identified.    " "   Narrative:      Collected By:[id number] PRINCE CAS SKY  Per Hospital Policy: Only change Specimen Src: to \"Line\" if  specified by physician order.  Right Forearm/Arm    E-Test [[id number]] Collected: 02/04/21 1630    Order Status: Completed Specimen: Other Updated: 02/06/21 1045     ETEST Sensitivity INTERIM    Narrative:      19 tel. [phone] 02/06/2021, 10:44, RB PERF. RESULTS CALLED TO:  [id number] RN  Per Hospital Policy: Only change Specimen Src: to \"Line\" if  specified by physician order.    MRSA By PCR (Amp) [[id number]] Collected: 02/05/21 0930    Order Status: Completed Specimen: Respirate from Nares Updated: 02/05/21 1758     Significant Indicator NEG     Source RESP     Site NARES     MRSA PCR Negative for MRSA by PCR.    Narrative:      Collected By:[id number] TERRY THOMAS  Collected By:[id number] TERRY THOMAS    Blood Culture [[id number]] Collected: 02/04/21 1757    Order Status: Completed Specimen: Blood from Peripheral Updated: 02/05/21 0632     Significant Indicator NEG     Source BLD     Site PERIPHERAL     Culture Result No Growth  Note: Blood cultures are incubated for 5 days and  are monitored continuously.Positive blood cultures  are called to the RN and reported as soon as  they are identified.      Narrative:      From different peripheral sites, if not done within the last  24 hours (Per Hospital Policy: Only change specimen source to  \"Line\" if specified by physician order)  No site indicated    Urinalysis [[id number]]  (Abnormal) Collected: 02/04/21 2148    Order Status: Completed Specimen: Urine, Fragoso Cath Updated: 02/04/21 2229     Color DK Yellow     Character Turbid     Specific Gravity 1.033     Ph 5.0     Glucose Negative mg/dL      Ketones Negative mg/dL      Protein 100 mg/dL      Bilirubin Moderate     Urobilinogen, Urine 0.2     Nitrite Negative     Leukocyte Esterase Trace     Occult Blood Large     Micro Urine Req Microscopic    Narrative:      If not done within the last 24 hours    " "Blood Culture [[id number]]     Order Status: Canceled Specimen: Blood from Peripheral     COV-2, FLU A/B, AND RSV BY PCR (2-4 HOURS CEPShelfieID): Collect NP swab in VTM [[id number]] Collected: 02/04/21 1710    Order Status: Completed Specimen: Respirate Updated: 02/04/21 1814     Influenza virus A RNA Negative     Influenza virus B, PCR Negative     RSV, PCR Negative     SARS-CoV-2 by PCR NotDetected     Comment: PATIENTS: Important information regarding your results and instructions can  be found at [email or web address]   \"After your  Covid-19 Test\"  [facility] providers: PLEASE REFER TO DE-ESCALATION AND RETESTING PROTOCOL  on [email or web address]  **The [employer] GeneXpert Xpress SARS-CoV-2 Test has been made available for  use under the Emergency Use Authorization (EUA) only.          SARS-CoV-2 Source NP Swab    Narrative:      Indication for culture:->Evaluation for sepsis without a  clear source of infection  Have you been in close contact with a person who is suspected  or known to be positive for COVID-19 within the last 30 days  (e.g. last seen that person < 30 days ago)->No    URINALYSIS [[id number]]     Order Status: Sent Specimen: Urine     BLOOD CULTURE [[id number]]     Order Status: Canceled Specimen: Blood from Peripheral         Blood Culture   Date Value Ref Range Status   09/13/2009 No growth after 5 days of incubation.  Final     Blood Culture Hold   Date Value Ref Range Status   05/17/2015 Collected  Final        Studies:  Dx-chest-limited (1 View)    Result Date: 2/5/2021 2/5/2021 5:29 PM HISTORY/REASON FOR EXAM:  CL placement TECHNIQUE/EXAM DESCRIPTION AND NUMBER OF VIEWS: Single AP view of the chest. COMPARISON: 2/5/2021 FINDINGS: Right central line projects over the SVC. Enteric tube passes below the level of the diaphragm. Endotracheal tube projects at the level of the aortic arch. There is right-sided pacer. Sternotomy wires are seen. The cardiomediastinal silhouette is stable.  " There is blunting of the right costophrenic angle. There is hazy right basilar opacity which is unchanged. No pneumothorax is identified.     Right central line projects over the SVC. No pneumothorax. Small right pleural effusion with hazy right basilar opacity which may represent atelectasis or pneumonitis. Stable prominence of the cardiomediastinal silhouette.     Dx-chest-portable (1 View)    Result Date: 2/7/2021 2/7/2021 6:12 AM HISTORY/REASON FOR EXAM:  For indication of respiratory failure; For indication of respiratory failure TECHNIQUE/EXAM DESCRIPTION AND NUMBER OF VIEWS: Single portable view of the chest. COMPARISON: Yesterday FINDINGS: The cardiomediastinal silhouettes are stable. There are ongoing bibasilar opacities, most likely atelectasis, worse on the right, as well as a small right pleural effusion. Endotracheal tube is no longer present. Right central venous line is unchanged in  position.     Stable bibasilar opacities, right greater than left.    Dx-chest-portable (1 View)    Result Date: 2/6/2021 2/6/2021 5:18 AM HISTORY/REASON FOR EXAM:  For indication of respiratory failure; For indication of respiratory failure TECHNIQUE/EXAM DESCRIPTION AND NUMBER OF VIEWS: Single portable view of the chest. COMPARISON: Yesterday FINDINGS: The cardiomediastinal silhouettes are stable. There is ongoing bibasilar atelectasis, mainly on the right, but with a small right pleural effusion. Support devices are positionally unchanged.     Stable chest findings compared to 2/5.    Dx-chest-portable (1 View)    Result Date: 2/5/2021 2/5/2021 5:22 AM HISTORY/REASON FOR EXAM:  For indication of respiratory failure; For indication of respiratory failure TECHNIQUE/EXAM DESCRIPTION AND NUMBER OF VIEWS: Single portable view of the chest. COMPARISON: Yesterday FINDINGS: Cardiac size is unchanged. Interstitial opacities persist in the mid and lower lung fields bilaterally, along with right basilar atelectasis and  probable small right pleural effusion. Endotracheal tube is positionally unchanged.     Ongoing findings consistent with mild pulmonary edema, with right basilar atelectasis and small right pleural effusion. No interval change.    Dx-chest-portable (1 View)    Result Date: 2021 7:55 PM HISTORY/REASON FOR EXAM:  Endotracheal tube placement TECHNIQUE/EXAM DESCRIPTION AND NUMBER OF VIEWS: Single portable view of the chest. COMPARISON: Prior image today at 1643 hours FINDINGS: An endotracheal tube is present, the tip of which is 2 to 3 cm above the corina. Overall cardiopulmonary appearance is unchanged.     Endotracheal tube in place as noted above. Stable cardiopulmonary appearance.    Dx-chest-portable (1 View)    Result Date: 2021 4:38 PM HISTORY/REASON FOR EXAM:  possible sepsis.. Shortness of breath TECHNIQUE/EXAM DESCRIPTION AND NUMBER OF VIEWS: Single portable view of the chest. COMPARISON: 2020 FINDINGS: Patient is status post cardiac surgery. Single-lead right-sided pacemaker present. External pacer pads are present. There is right pleural fluid. There is vascular congestion. No pneumothorax.     Enlarged cardiac silhouette status post cardiac surgery with small right-sided pleural effusion and vascular congestion.    Us-extremity Venous Lower Bilat    Result Date: 2021   Vascular Laboratory  CONCLUSIONS  No superficial or deep venous thrombosis.  DAYNA, AILYN  Exam Date:     2021 09:32  Room #:     Inpatient  Priority:     Stat  Ht (in):             Wt (lb):  Ordering Physician:        JO ANN GARCIA  Referring Physician:       [id number] Diamond  Sonographer:               Gladys Clark RVT  Study Type:                Complete Bilateral  Technical Quality:         Adequate  Age:    65    Gender:     M  MRN:    [id number]  :    1955      BSA:  Indications:     Edema, unspecified, Localized swelling, mass and lump,                   unspecified  lower limb  CPT Codes:       31013  ICD Codes:       R60.9  R22.40  History:         Bilateral lower extremity swelling/edema. Prior study done                   12/15/20.  Limitations:  PROCEDURES:  Bilateral lower extremity venous duplex imaging.  The following venous structures were evaluated: common femoral, profunda  femoral, greater saphenous, femoral, popliteal , peroneal and posterior  tibial veins.  Serial compression, augmentation maneuvers,  color and spectral Doppler  flow evaluations were performed.  FINDINGS:  Bilateral lower extremities -  No superficial or deep venous thrombosis.  Complete color filling and compressibility with normal venous flow dynamics  including spontaneous flow, response to augmentation maneuvers, and  respiratory phasicity.  The peroneal and posterior tibial veins are difficult to assess for  compressibility, but flow response to augmentation is demonstrated.  Alo Mohamud MD  (Electronically Signed)  Final Date:      05 February 2021                   09:54    Ec-echocardiogram Complete W/o Cont    Result Date: 2/4/2021  Transthoracic Echo Report Echocardiography Laboratory CONCLUSIONS Mildly depressed left ventricular function.  Left ventricular ejection fraction is visually estimated to be 45%, likely affected by rapid heart rate. Endocardial borders not well visualized to accurate assess wall motion abnormalities. Recommend repeating limited study with contrast. The right ventricle was grossly normal in size and function. Severely dilated left atrium. Known mitral valve bioprosthesis which is functioning normally, transvalvular gradient is 6.5 mmHg @. Moderate tricuspid regurgitation. Pleural effusion noted. Compared to the study done on 11/20/2020, LVEF is measured lower, likely affected by rapid heart rate. Otherwise, no significant changes. AILYN MCINTOSH Exam Date:         02/04/2021                    20:52 Exam Location:     Inpatient Priority:           Routine Ordering Physician:        FIORDALIZA FERGUSON                             ([id number]) Referring Physician:       [id number] Amor Sonographer:               Jasbir Alston [facility] Age:    65     Gender:    M MRN:    [id number] :    1955 BSA:    2.29   Ht (in):    70     Wt (lb):    250 Exam Type:     Complete Indications:     Persistent atrial fibrillation ICD Codes:       I48.1 CPT Codes:       73546 BP:   94     /   66     HR: Technical Quality:       Poor MEASUREMENTS  (Male / Female) Normal Values 2D ECHO LV Diastolic Diameter PLAX        4.6 cm                4.2 - 5.9 / 3.9 - 5.3 cm LV Systolic Diameter PLAX         2.9 cm                2.1 - 4.0 cm IVS Diastolic Thickness           1.2 cm                LVPW Diastolic Thickness          1.3 cm                LVOT Diameter                     2.2 cm                LV Ejection Fraction MOD BP       54.8 %                >= 55  % LV Ejection Fraction MOD 4C       48.2 %                LV Ejection Fraction MOD 2C       54.3 %                IVC Diameter                      2.1 cm                M-MODE Aortic Root Diameter MM           4.3 cm                DOPPLER AV Peak Velocity                  1.1 m/s               AV Peak Gradient                  5.1 mmHg              AV Mean Gradient                  3.1 mmHg              LVOT Peak Velocity                0.85 m/s              AV Area Cont Eq vti               2.7 cm2               MV Velocity Time Integral         35 cm                 TR Peak Velocity                  249 cm/s              PV Peak Velocity                  0.99 m/s              PV Peak Gradient                  3.9 mmHg              RVOT Peak Velocity                0.82 m/s              * Indicates values subject to auto-interpretation LV EF:        % FINDINGS Left Ventricle Normal left ventricular size.  Mild concentric left ventricular hypertrophy.  Mildly depressed left ventricular function.  Left ventricular ejection fraction is  visually estimated to be 45%. Endocardial borders not well visualized to accurate assess wall motion abnormalities.  Diastolic function indeterminate due to mitral valve replacement and atrial fibrillation. Right Ventricle The right ventricle was grossly normal in size and function. Right Atrium Enlarged right atrium. Left Atrium Severely dilated left atrium. Left atrial volume index is 80  mL/sq m. Mitral Valve Known mitral valve bioprosthesis which is functioning normally, transvalvular gradient is 6.5 mmHg @120. Aortic Valve Structurally normal aortic valve without significant stenosis or regurgitation. Tricuspid Valve Structurally normal tricuspid valve. No tricuspid stenosis. Moderate tricuspid regurgitation. Estimated right ventricular systolic pressure  is 35 mmHg. Pulmonic Valve Structurally normal pulmonic valve without significant stenosis or regurgitation. Pericardium No pericardial effusion seen. Pleural effusion noted. Aorta The aortic root is normal. Yogesh Galindo MD (Electronically Signed) Final Date:     04 February 2021                 22:48    Ec-lopez W/o Cont    Result Date: 2/6/2021  Transesophageal Echo Report Echocardiography Laboratory CONCLUSIONS No evidence of endocarditis. Mildly reduced left ventricular systolic function. Left ventricular ejection fraction is visually estimated to be 45%. Normal right ventricular systolic function. The left atrium is surgically absent. Known mitral valve bioprosthesis which is functioning normally with appropriate transvalvular gradient. Structurally normal tricuspid valve without significant stenosis Moderate tricuspid regurgitation. Estimated right ventricular systolic pressure  is 35 mmHg. AILYN MCINTOSH Exam Date:          02/06/2021                     11:02 Exam Location:      Inpatient Priority:            Routine Ordering Physician:        JACK GARCIA  ([id number]) Referring Physician:       RADHA Kinney Sonographer:                Yonis Wilder                            [facility] Age:    65     Gender:    M MRN:    [id number] :    1955 BSA:    2.31   Ht (in):    70     Wt (lb):    254 Report Type:      Complete Indications:     Endocarditis and heart valve disorders in diseases                  classified elsewhere ICD Codes:       I39 CPT Codes:       41365 BP:   107    /   77     HR: Technical Quality:       Good MEASUREMENTS  (Male / Female) Normal Values 2D ECHO Estimated LV Ejection Fraction    45 %                  * Indicates values subject to auto-interpretation LV EF:  45    % Medications The patient was sedated on a ventilator. An additional 2 mg IV versed was given, I personally supervised moderate sedation with the bedside ICU nurse from 11:05 to 11:16 AM Limitations none Complications none Proc. Components The probe was inserted and manipulated by Dr. Cedeno. Probe # [id number]  was used for this procedure. FINDINGS Left Ventricle Mildly reduced left ventricular systolic function. Left ventricular ejection fraction is visually estimated to be 45%. Right Ventricle Moderately dilated right ventricle. Normal right ventricular systolic function.  Pacer wire seen in right ventricle. Right Atrium The right atrium is normal in size.  Pacemaker wire present in the right atrial cavity. Left Atrium The left atrium is surgically absent. LA Appendage Normal left atrial appendage. IA Septum The interatrial septum is normal. IV Septum The interventricular septum is normal. Mitral Valve Known mitral valve bioprosthesis which is functioning normally with appropriate transvalvular gradient. No valvular vegetations. Aortic Valve Structurally normal aortic valve without significant stenosis or regurgitation.  No valvular vegetations. Tricuspid Valve Structurally normal tricuspid valve without significant stenosis Moderate tricuspid regurgitation. Estimated right ventricular systolic pressure  is 35 mmHg.   No valvular vegetations.  Pulmonic Valve Structurally normal pulmonic valve without significant stenosis or regurgitation. Pericardium Normal pericardium without effusion. Aorta The aortic root is normal. Josafat Cedeno MD (Electronically Signed) Final Date:     06 February 2021                 13:06      IMPRESSION:   1.  Septic shock, shock resolved   2.  Group A streptococcus bacteremia.  Source right lower extremity cellulitis  3.  Right lower extremity cellulitis  4.  Acute kidney injury, improving  5.  Leukocytosis  6.  Known bioprosthetic mitral valve replacement  7.  Type 2 diabetes mellitus    PLAN:   Morales Olivier is a 65 y.o. obese man with a history of a bioprosthetic mitral valve replacement in March 2017, type 2 diabetes mellitus, pacemaker placement in July 2014, coronary artery disease and COPD admitted after being found down.  Patient admitted for septic shock secondary to severe right lower extremity cellulitis with group A streptococcal bacteremia.  Patient now weaned off of pressors and was extubated overnight.  Blood culture on 2/4 (1 out of 2 sets) + group A streptococcus.  Repeat blood cultures on 2/6 remain negative to date.  KD without any evidence of prosthetic valve or native valve endocarditis.    -Discontinue Unasyn  -Transition to IV penicillin  -Okay to place midline once repeat blood cultures remain negative for at least 48 hours  -Anticipate a 14-day course of antibiotics from 2/6.  Stop date 2/20/2021      Plan of care discussed with intensivist Mckay Diaz M.D.. Will continue to follow    Nya Isaac M.D.      Please note that this dictation was created using voice recognition software. I have worked with technical experts from [facility] to optimize the interface.  I have made every reasonable attempt to correct obvious errors, but there may be errors of grammar and possibly content that I did not discover before finalizing the note.

## 2021-02-07 NOTE — PROGRESS NOTES
Interval Progress Note:    Pt having right leg pain.  Pt passed bedside swallow evaluation.  Start pt on PO pain medications - Oxy IR.    Pt back in a fib.  Re-bolus with amiodarone and start amio gtt.      Pt would benefit from Cardiology re-assessing and potentially adding more beta blockage or starting pt on digoxin.    Pt's right leg cellulitis likely exacerbating atrial fibrillation from catecholamines from pain and cytokines from infection.  Hopefully, once pt's right leg cellulitis is improved pt will not have inciting factors for atrial fibrillation.

## 2021-02-07 NOTE — CARE PLAN
Problem: Safety  Goal: Will remain free from falls  Outcome: PROGRESSING AS EXPECTED - Pt will remain free from falls this shift. Call light within reach, encouraged pt to call for assistance if needed. Bed alarm in place at all times     Problem: Respiratory:  Goal: Respiratory status will improve  Outcome: PROGRESSING AS EXPECTED - Pt extubeated to 3L NC     Problem: Safety - Medical Restraint  Goal: Free from restraint(s) (Restraint for Interference with Medical Device)  Description: INTERVENTIONS:  1. ONCE/SHIFT or MINIMUM Q12H: Assess and document the continuing need for restraints  2. Q24H: Continued use of restraint requires LIP to perform face to face examination and written order  3. Identify and implement measures to help patient regain control  Outcome: MET - Restraints removed, pt extubated

## 2021-02-07 NOTE — PROGRESS NOTES
Pt arrived to unit, report received from SICU RN. Pt wheezing and complaining of pain and nausea. RT called for breathing treatment. Zofran given. Pt also complaining of muscle spasms in BLE, SICU RN says that this is not new, but to address with day shift team.

## 2021-02-07 NOTE — PROGRESS NOTES
2 RN Skin check with Lalito AKERS  DTI noted on sacrum, photo taken, wound consult placed.   RLE hot, red, blistered  Scabbed wound on LLE  Upper and lower lips scabbed, cracked  Redness in b/l groin folds

## 2021-02-08 NOTE — PROGRESS NOTES
Critical Care Progress Note    Date of admission  2/4/2021    Chief Complaint  65 y.o. male who presented 2/4/2021 after being found down after a fall.  Pt complained of chest pain and SOB.  Pt found to be in a fib with RVR and had right leg cellulitis.  Pt was working hard to breathe and ER doctor thought it was beneficial to intubate pt prior to respiratory failure.    Hospital Course  2/5 - Vent, NE drip, heparin drip, AF -> AR, AMIO IV to PO, BC + Strep? Zosyn/Vanco  2/6-liberation trial, weaned off norepinephrine, remains on amiodarone but transitioning to p.o., heparin drip, Descalated to Unasyn for strep pyogenes positive blood culture, KD does not reveal endocarditis, ID consulted    Interval Problem Update  Reviewed last 24 hour events:    Moved to [facility] to make room in the trauma ICU  Recurrent A. fib RVR last night, amnio bolus and started back in amiodarone drip  So far tolerated extubation yesterday, work of breathing acceptable  Patient lethargic at times and somnolent but does follow  A&O x3  AF-110-120s  SBp 100-120s  UO adequate  Amio IV & PO  Heparin drip  Coags reviewed w/Pharmacy  Work of breathing acceptable delete that  1-3 LPM NC  RN to initiate I-S with patient  Tm 99.3  WBC  Unasyn -> PCN several weak course  ID eval noted      Repeat blood cultures  Transition from heparin to warfarin, overlap until therapeutic  Reduce amiodarone from 1 to 0.5 and DC IV after current bag empty  Continue penicillin  Mobilize      Case reviewed with infectious disease, antibiotics will be changed to penicillin repeat blood cultures obtained  Case reviewed with cardiology Re: Management of heparin drip, anticoagulation, amiodarone.       YESTERADY  Sedate on vent  Follows  DEX 0.5  FENT pops  V#3 50%-> 30%, PEEP 8  ABG 7.36/34/123  CXR  R base slt better  Paced 80  SBp 90s  NE 2  Amio IV -> PO  UO adequate, ARB + 6.1L  B/c 37/1.26 - better  Tm 101.7  WBC 14.7 better  Leg no change/slt better  BC Strep species  1/2  MRSA nares neg  Vanco/Zosyn  SSI 12 med  Heparin drip  Plt 119    Replete Phos  De-escalate ABX to Unasyn if not VRE  Lasix?  Up to High SSI    Initial SBT not successful, patient perhaps a bit too somnolent    KD performed by Dr. Cedeno, no evidence of endocarditis  Blood culture positive for Streptococcus pyogenes (group A)  We will consult ID for antibiotic treatment duration in the face of 2 bioprosthetic valves and bacteremia    Follow-up SBT with good weaning parameters and LOC better  Liberation trial    Review of Systems  Review of Systems   Unable to perform ROS: Acuity of condition   Constitutional: Positive for malaise/fatigue. Negative for chills and fever.   HENT: Negative for congestion and sore throat.    Respiratory: Negative for cough, sputum production and shortness of breath.    Cardiovascular: Positive for palpitations and leg swelling (And pain in the right lower extremity). Negative for chest pain.   Gastrointestinal: Negative for abdominal pain, nausea and vomiting.   Genitourinary: Negative for dysuria and flank pain.   Musculoskeletal: Negative for back pain.   Neurological: Negative for focal weakness and headaches.   Psychiatric/Behavioral: The patient is not nervous/anxious.         Vital Signs for last 24 hours   Temp:  [36.9 °C (98.4 °F)-37.2 °C (99 °F)] 36.9 °C (98.5 °F)  Pulse:  [] 112  Resp:  [14-34] 16  BP: ()/(62-82) 110/79  SpO2:  [93 %-100 %] 98 %    Hemodynamic parameters for last 24 hours       Respiratory Information for the last 24 hours       Physical Exam   Physical Exam  Vitals signs reviewed.   Constitutional:       Appearance: He is obese. He is ill-appearing (Improved). He is not toxic-appearing.      Interventions: Nasal cannula in place.      Comments: Lethargic at times   HENT:      Head: Normocephalic and atraumatic.      Mouth/Throat:      Mouth: Mucous membranes are moist.   Eyes:      General: No scleral icterus.     Extraocular Movements:  Extraocular movements intact.      Pupils: Pupils are equal, round, and reactive to light.   Neck:      Musculoskeletal: Neck supple. No neck rigidity.      Vascular: No JVD.   Cardiovascular:      Rate and Rhythm: Normal rate and regular rhythm.      Pulses: Normal pulses.      Heart sounds: Murmur present. No gallop.       Comments: SR  Pulmonary:      Effort: Pulmonary effort is normal.      Breath sounds: No wheezing, rhonchi or rales.   Abdominal:      General: Abdomen is protuberant. Bowel sounds are normal. There is no distension.      Palpations: Abdomen is soft. There is no fluid wave, hepatomegaly or splenomegaly.      Tenderness: There is no abdominal tenderness. There is no right CVA tenderness, left CVA tenderness or guarding. Negative signs include Hathaway's sign and McBurney's sign.   Genitourinary:     Comments: Fragoso  Musculoskeletal:      Right lower leg: Edema present.      Left lower leg: Edema present.   Lymphadenopathy:      Cervical: No cervical adenopathy.   Skin:     General: Skin is warm and dry.      Coloration: Skin is not cyanotic or mottled.      Findings: Erythema (Improved) and wound present.      Nails: There is no clubbing.        Comments: Right lower extremity from ankle to knee with increased erythema, increased warmth, tenderness and one small area of right lateral ankle where there is a cloudy fluid-filled blister that is about 4 mm x 10 mm all consistent with cellulitis-no change still fairly inflamed involving most of the lower extremity on the right below the knee and above the ankle   Neurological:      Mental Status: He is lethargic.      GCS: GCS eye subscore is 4. GCS verbal subscore is 4. GCS motor subscore is 6.      Cranial Nerves: Cranial nerves are intact.      Sensory: Sensation is intact.      Motor: Motor function is intact.      Comments: Intermittently confused, oriented most the time, neurological exam nonfocal   Psychiatric:         Attention and Perception:  He is inattentive.         Mood and Affect: Mood normal. Mood is not anxious.         Speech: Speech normal.         Behavior: Behavior is not agitated. Behavior is cooperative.         Cognition and Memory: Cognition is impaired (Mild, oriented 2-3 for me).         Medications  Current Facility-Administered Medications   Medication Dose Route Frequency Provider Last Rate Last Admin   • penicillin G potassium 2.5 Million Units in  mL IVPB  2.5 Million Units Intravenous Q4HRS Nya Isaac M.D.   Stopped at 02/07/21 2133   • insulin regular (HumuLIN R,NovoLIN R) injection  3-14 Units Subcutaneous 4X/DAY ACHS Mckay Diaz M.D.   Stopped at 02/07/21 1700    And   • dextrose 50% (D50W) injection 50 mL  50 mL Intravenous Q15 MIN PRN Mckay Diaz M.D.       • enoxaparin (LOVENOX) inj 120 mg  1 mg/kg Subcutaneous Q12HRS Mckay Diaz M.D.   120 mg at 02/07/21 1536   • MD Alert...Warfarin per Pharmacy   Other PHARMACY TO DOSE Mckay Diaz M.D.       • [START ON 2/20/2021] amiodarone (Cordarone) tablet 200 mg  200 mg Oral DAILY Mckay Diaz M.D.       • amiodarone (Cordarone) tablet 400 mg  400 mg Oral TWICE DAILY Mckay Diaz M.D.   400 mg at 02/07/21 1741   • senna-docusate (PERICOLACE or SENOKOT S) 8.6-50 MG per tablet 2 Tab  2 Tab Oral BID Mckay Diaz M.D.        And   • polyethylene glycol/lytes (MIRALAX) PACKET 1 Packet  1 Packet Oral QDAY PRN Mckay Diaz M.D.        And   • magnesium hydroxide (MILK OF MAGNESIA) suspension 30 mL  30 mL Oral QDAY PRN Mckay Diaz M.D.   30 mL at 02/07/21 1740    And   • bisacodyl (DULCOLAX) suppository 10 mg  10 mg Rectal QDAY PRN Mckay Diaz M.D.       • acetaminophen (Tylenol) tablet 650 mg  650 mg Oral Q6HRS PRN Mckay Diaz M.D.       • warfarin (COUMADIN) tablet 2.5 mg  2.5 mg Oral DAILY AT 1800 Mckay Diaz M.D.   2.5 mg at 02/07/21 1741   • midazolam (Versed) 2 MG/2ML injection 1 mg  1 mg  Intravenous Q HOUR PRN Josafat Cedeno M.D.   2 mg at 02/06/21 1105   • oxyCODONE immediate-release (ROXICODONE) tablet 5-10 mg  5-10 mg Oral Q3HRS PRN John Hein D.O.   5 mg at 02/07/21 1040   • morphine (pf) 4 mg/mL injection 2 mg  2 mg Intravenous Q HOUR PRN Mckay Diaz M.D.       • norepinephrine (Levophed) infusion 8 mg/250 mL (premix)  0-30 mcg/min Intravenous Continuous Deepak Bravo M.D.   Stopped at 02/06/21 1035   • Respiratory Therapy Consult   Nebulization Continuous RT Gerald Williamson M.D.       • ipratropium-albuterol (DUONEB) nebulizer solution  3 mL Nebulization Q2HRS PRN (RT) Gerald Williamson M.D.   3 mL at 02/07/21 0506   • MD Alert...ICU Electrolyte Replacement per Pharmacy   Other PHARMACY TO DOSE Gerald Williamson M.D.       • labetalol (NORMODYNE/TRANDATE) injection 10 mg  10 mg Intravenous Q4HRS PRN John Hein, D.O.       • ondansetron (ZOFRAN) syringe/vial injection 4 mg  4 mg Intravenous Q4HRS PRN John Hein, D.O.   4 mg at 02/07/21 0831   • ondansetron (ZOFRAN ODT) dispertab 4 mg  4 mg Oral Q4HRS PRN John Hein, D.O.           Fluids    Intake/Output Summary (Last 24 hours) at 2/7/2021 2215  Last data filed at 2/7/2021 2200  Gross per 24 hour   Intake 3508.07 ml   Output 1015 ml   Net 2493.07 ml       Laboratory  Recent Labs     02/05/21  0535 02/06/21  0445   ISTATAPH 7.414 7.368*   ISTATAPCO2 32.3 32.8   ISTATAPO2 126* 120*   ISTATATCO2 22 20   [id number] 99 99   ISTATARTHCO3 20.7 18.9   ISTATARTBE -3 -6*   ISTATTEMP 101.1 F 99.7 F   ISTATFIO2 50 40   ISTATSPEC Arterial Arterial   ISTATAPHTC 7.393* 7.359*   [id number] 135* 123*         Recent Labs     02/05/21  0550 02/06/21  0420 02/07/21  0208   SODIUM 130* 136 139   POTASSIUM 4.3 3.8 3.6   CHLORIDE 98 105 108   CO2 21 20 21   BUN 47* 37* 30*   CREATININE 1.78* 1.26 1.21   MAGNESIUM 1.8 2.4 2.3   PHOSPHORUS 1.8* 2.2* 2.0*   CALCIUM 8.0* 7.3* 7.6*     Recent Labs     02/05/21  0550 02/06/21  0420 02/07/21  0208    GLUCOSE 285* 194* 141*     Recent Labs     02/05/21  0550 02/06/21  0420 02/07/21  0208   WBC 26.9* 14.7* 15.8*   NEUTSPOLYS 91.80* 88.30* 87.30*   LYMPHOCYTES 3.10* 4.70* 5.70*   MONOCYTES 3.40 5.00 4.60   EOSINOPHILS 0.00 0.10 0.30   BASOPHILS 0.30 0.40 0.30     Recent Labs     02/05/21  0550 02/06/21  0420 02/07/21  0208 02/07/21  0827   RBC 3.97* 3.74* 3.41*  --    HEMOGLOBIN 12.7* 11.8* 10.8*  --    HEMATOCRIT 38.7* 35.7* 32.8*  --    PLATELETCT 165 119* 124*  --    PROTHROMBTM  --   --   --  29.8*   INR  --   --   --  2.74*       Imaging  X-Ray:  I have personally reviewed the images and compared with prior images.  EKG:  I have personally reviewed the images and compared with prior images.  Echo:   Reviewed    Assessment/Plan  * Acute respiratory failure with hypoxia (HCC)- (present on admission)  Assessment & Plan  Intubated in ER for respiratory distress 2/4  Combination of A. fib RVR and valvular heart disease with sepsis from right lower extremity cellulitis  RT protocols  Extubated 2/6  Mobilize  Incentive spirometry  Monitor for ESTELA desaturations, auto BiPAP as needed      Sepsis (HCC)- (present on admission)  Assessment & Plan  This is Septic shock Present on admission  SIRS criteria identified on my evaluation include: Tachycardia, with heart rate greater than 90 BPM, Tachypnea, with respirations greater than 20 per minute and Leukocytosis, with WBC greater than 12,000  Source is cellulitis  Presentation includes: Severe sepsis present and persistent hypotension after 30 ml/kg completed.   Despite appropriate fluid resuscitation with crystalloid given per sepsis guidelines, the patient remains hypotensive with systolic blood pressure less than 90 or MAP less than 65  Hemodynamic support with additional fluids and IV vasopressors as needed to maintain a SBP of 90 or MAP of 65  IV antibiotics as appropriate for source of sepsis  Reassessment: I have reassessed the patient's hemodynamic status    Right  lower extremity cellulitis my exam, no change  1 blood culture growing Streptococcus pyogenes, repeat blood cultures x2  ID consult noted  KD negative for endocarditis per Dr. Cedeno 2/6  Norepinephrine weaned off  Still in and out of A. fib on amiodarone  Echocardiogram with normal aortic and mitral valve function (both are artificial valves)  Fluid resuscitation complete      Cellulitis of right lower extremity- (present on admission)  Assessment & Plan  Classic right lower extremity changes consistent with cellulitis-no change  Blood culture x1+ for strep species?-Growing Streptococcus pyogenes  Vancomycin and Zosyn initiated in ER, de-escalate as appropriate  May need to transition to linezolid if creatinine gets any higher  Consult ID evaluation with mechanical valves and positive blood cultures a started bug [facility], 1 patient crushed me  Echocardi if you could wait on type and duration for S120 and change antibiotics now if you would like and then see tomorrow or even have somebody else see Monday that would be great.    Antibiotics Mr. Aceves has right lower extremity cellulitis with sepsis coming off pressors and the ventilator  1 of 2 blood cultures positive for Streptococcus pyogenes with initial treatment Vanco Zosyn,  KD 2/6 negative for endocarditis he has 2 bioprosthetic valves  Noninvasive study negative for DVT bilaterally in the lower extremities 2/5  ID following and repeat blood cultures to be obtained in antibiotics modified to penicillin, plan prolonged course      COPD (chronic obstructive pulmonary disease) (HCC)- (present on admission)  Assessment & Plan  History of COPD  Not overly bronchospastic or exacerbating at this time  RT protocols  Nebulizer treatments 4 times daily and as needed  Update vaccines prior to discharge    Type 2 diabetes mellitus without complication, without long-term current use of insulin (HCC)- (present on admission)  Assessment & Plan  Continue high sliding  scale  Monitor for the need to transition to insulin continuous infusion  Monitor for need for Lantus therapy especially as he starts taking p.o.  Serial blood sugar testing, AC/at bedtime  Hypoglycemia protocols    Atrial fibrillation with RVR (HCC)- (present on admission)  Assessment & Plan  Atrial fibrillation likely due to sepsis/cellulitis  Converted to AF again overnight, repeat bolus and resumption of amiodarone drip  Heparin drip ongoing, probably can transition to warfarin and full dose Lovenox  Continue to optimize electrolytes  Continue oral amiodarone      Paroxysmal atrial fibrillation (HCC)- (present on admission)  Assessment & Plan  Recurrent PAF, back in A. fib on IV amiodarone, consider deseeding IV amiodarone after current bag  Continue anticoagulation with heparin, transition to subcu Lovenox full dose and oral warfarin  Continue oral amiodarone overlapping with IV  Continue optimize electrolytes, oxygenation and acid-base balance  TT echocardiogram noted, KD noted-no evidence of endocarditis, mechanical valves functioning normally  Cardiology following, reviewed with Dr. Cedeno    Lactic acid acidosis- (present on admission)  Assessment & Plan  Secondary to sepsis, improved    Pacemaker- (present on admission)  Assessment & Plan  Cardiac monitoring, pacing at 80 when atrial fibrillation controlled with amiodarone  Continue to optimize electrolytes  Cardiology following    ESTELA (obstructive sleep apnea)- (present on admission)  Assessment & Plan  Reassess with review of systems as well as observation for apnea once extubated  Bridge with PAP as needed post extubation  Consider continuous nocturnal oximetry study prior to discharge    H/O mitral valve and aortic valve replacement- (present on admission)  Assessment & Plan  Echocardiogram 2/4 revealed functioning prosthetic mitral and aortic valves  Cardiology following-KD 2/6 with no evidence of endocarditis and normal mitral and aortic prosthetic  valve function  ID following    Anticoagulated- (present on admission)  Assessment & Plan  Heparin drip for PAF, transition to full dose Lovenox and warfarin       VTE:  Heparin  Ulcer: H2 Antagonist  Lines: Central Line  Ongoing indication addressed and Fragoso Catheter  Ongoing indication addressed    I have performed a physical exam and reviewed and updated ROS and Plan today (2/7/2021). In review of yesterday's note (2/6/2021), there are no changes except as documented above.     Discussed patient condition and risk of morbidity and/or mortality with RN, RT, Pharmacy, Charge nurse / hot rounds and cardiology and infectious disease    The patient remains critically ill.  Tolerated liberation trials so far.  Recurrent A. fib RVR and back on IV amiodarone.  Continued IV heparin and oral overlap with amiodarone p.o.  Will attempt to transition off IV amiodarone again and transition to oral anticoagulant with warfarin.  Follow-up blood cultures x2 and ID switching antibiotics to penicillin.    Critical care time = 40 minutes in directly providing and coordinating critical care and extensive data review.  No time overlap and excludes procedures.

## 2021-02-08 NOTE — PROGRESS NOTES
Patient laying in bed complaining of mild right leg pain but falls asleep during conversation. Patient complaining about food and that he doesn't want what we cook because its no good. Patient saturating well on 1l of oxygen. Doctor at bedside with no new orders. Will continue to follow.

## 2021-02-08 NOTE — PROGRESS NOTES
[facility] Cardiology Follow-up Consult Note  Date of note:    2/7/2021          Patient ID:  Name:   Morales Olivier   YOB: 1955  Age:   65 y.o.  male   MRN:   [id number]    Chief Complaint   Patient presents with   • Chest Pain   • Shortness of Breath   • Fall Less than 10 Feet     down for 2-4 days at an [facility].        Interim Events:  Pt extubated back in afib/flutter in the AM didn't revert with IV amio        ROS  No NV, No Bleeding, No dizziness   All other review of systems reviewed and negative.    Past medical, surgical, social, and family history reviewed and unchanged from admission except as noted in assessment and plan.    Medications: Reviewed in MAR  Current Facility-Administered Medications   Medication Dose Frequency Provider Last Rate Last Admin   • penicillin G potassium 2.5 Million Units in  mL IVPB  2.5 Million Units Q4HRS Nya Isaac M.D.   Stopped at 02/07/21 2133   • insulin regular (HumuLIN R,NovoLIN R) injection  3-14 Units 4X/DAY ACHS Mckay Diaz M.D.   Stopped at 02/07/21 1700    And   • dextrose 50% (D50W) injection 50 mL  50 mL Q15 MIN PRN Mckay Diaz M.D.       • enoxaparin (LOVENOX) inj 120 mg  1 mg/kg Q12HRS Mckay Diaz M.D.   120 mg at 02/07/21 1536   • MD Alert...Warfarin per Pharmacy   PHARMACY TO DOSE Mckay Diaz M.D.       • [START ON 2/20/2021] amiodarone (Cordarone) tablet 200 mg  200 mg DAILY Mckay Diaz M.D.       • amiodarone (Cordarone) tablet 400 mg  400 mg TWICE DAILY Mckay Diaz M.D.   400 mg at 02/07/21 1741   • senna-docusate (PERICOLACE or SENOKOT S) 8.6-50 MG per tablet 2 Tab  2 Tab BID Mckay Diaz M.D.        And   • polyethylene glycol/lytes (MIRALAX) PACKET 1 Packet  1 Packet QDAY PRN Mckay Diaz M.D.        And   • magnesium hydroxide (MILK OF MAGNESIA) suspension 30 mL  30 mL QDAY PRN Mckay Diaz M.D.   30 mL at 02/07/21 1740    And   • bisacodyl (DULCOLAX) suppository 10 mg   "10 mg QDAY PRN Mckay Diaz M.D.       • acetaminophen (Tylenol) tablet 650 mg  650 mg Q6HRS PRN Mckay Diaz M.D.       • warfarin (COUMADIN) tablet 2.5 mg  2.5 mg DAILY AT 1800 Mckay Diaz M.D.   2.5 mg at 02/07/21 1741   • midazolam (Versed) 2 MG/2ML injection 1 mg  1 mg Q HOUR PRN Josafat Cedeno M.D.   2 mg at 02/06/21 1105   • oxyCODONE immediate-release (ROXICODONE) tablet 5-10 mg  5-10 mg Q3HRS PRN John Hein D.O.   5 mg at 02/07/21 1040   • morphine (pf) 4 mg/mL injection 2 mg  2 mg Q HOUR PRN Mckay Diaz M.D.       • norepinephrine (Levophed) infusion 8 mg/250 mL (premix)  0-30 mcg/min Continuous Deepak Bravo M.D.   Stopped at 02/06/21 1035   • Respiratory Therapy Consult   Continuous RT Gerald Williamson M.D.       • ipratropium-albuterol (DUONEB) nebulizer solution  3 mL Q2HRS PRN (RT) Gerald Williamson M.D.   3 mL at 02/07/21 0506   • MD Alert...ICU Electrolyte Replacement per Pharmacy   PHARMACY TO DOSE Gerald Williamson M.D.       • labetalol (NORMODYNE/TRANDATE) injection 10 mg  10 mg Q4HRS PRN John Hein, D.O.       • ondansetron (ZOFRAN) syringe/vial injection 4 mg  4 mg Q4HRS PRN John Hein, D.O.   4 mg at 02/07/21 0831   • ondansetron (ZOFRAN ODT) dispertab 4 mg  4 mg Q4HRS PRN John Hein, D.O.       Last reviewed on 2/4/2021  6:08 PM by Irma CLAYTON [facility]es [employer], PhT    No Known Allergies    Physical Exam  Body mass index is 36.38 kg/m². /79   Pulse (!) 112   Temp 36.9 °C (98.5 °F)   Resp 16   Ht 1.778 m (5' 10\")   Wt 115 kg (253 lb 8.5 oz)   SpO2 98%    Vitals:    02/07/21 1800 02/07/21 1900 02/07/21 2000 02/07/21 2103   BP: (!) 99/74 110/82 104/78 110/79   Pulse: (!) 109 (!) 111 (!) 110 (!) 112   Resp: 15 19 17 16   Temp:    36.9 °C (98.5 °F)   TempSrc:       SpO2: 98% 97% 99% 98%   Weight:       Height:        Oxygen Therapy:  Pulse Oximetry: 98 %, O2 (LPM): 1, O2 Delivery Device: Silicone Nasal Cannula    General: no apparent " distress  Eyes: nl conjunctiva  ENT: OP clear  Neck: no JVD   Lungs: normal respiratory effort, CTAB  Heart: regular in flutter tachy  EXT stable rt>lt edema bilateral lower extremities. Abdomen: soft, non tender, non distended,  Neurological: No focal deficits  Psychiatric: Appropriate affect,   Skin: Warm extremities stable red    Labs (personally reviewed and notable for):   Recent Results (from the past 24 hour(s))   ACCU-CHEK GLUCOSE    Collection Time: 02/07/21 12:38 AM   Result Value Ref Range    Glucose - Accu-Ck 132 (H) 65 - 99 mg/dL   CBC with Differential    Collection Time: 02/07/21  2:08 AM   Result Value Ref Range    WBC 15.8 (H) 4.8 - 10.8 K/uL    RBC 3.41 (L) 4.70 - 6.10 M/uL    Hemoglobin 10.8 (L) 14.0 - 18.0 g/dL    Hematocrit 32.8 (L) 42.0 - 52.0 %    MCV 96.2 81.4 - 97.8 fL    MCH 31.7 27.0 - 33.0 pg    MCHC 32.9 (L) 33.7 - 35.3 g/dL    RDW 62.9 (H) 35.9 - 50.0 fL    Platelet Count 124 (L) 164 - 446 K/uL    MPV 11.0 9.0 - 12.9 fL    Neutrophils-Polys 87.30 (H) 44.00 - 72.00 %    Lymphocytes 5.70 (L) 22.00 - 41.00 %    Monocytes 4.60 0.00 - 13.40 %    Eosinophils 0.30 0.00 - 6.90 %    Basophils 0.30 0.00 - 1.80 %    Immature Granulocytes 1.80 (H) 0.00 - 0.90 %    Nucleated RBC 0.00 /100 WBC    Neutrophils (Absolute) 13.82 (H) 1.82 - 7.42 K/uL    Lymphs (Absolute) 0.90 (L) 1.00 - 4.80 K/uL    Monos (Absolute) 0.72 0.00 - 0.85 K/uL    Eos (Absolute) 0.05 0.00 - 0.51 K/uL    Baso (Absolute) 0.04 0.00 - 0.12 K/uL    Immature Granulocytes (abs) 0.28 (H) 0.00 - 0.11 K/uL    NRBC (Absolute) 0.00 K/uL   Basic Metabolic Panel (BMP)    Collection Time: 02/07/21  2:08 AM   Result Value Ref Range    Sodium 139 135 - 145 mmol/L    Potassium 3.6 3.6 - 5.5 mmol/L    Chloride 108 96 - 112 mmol/L    Co2 21 20 - 33 mmol/L    Glucose 141 (H) 65 - 99 mg/dL    Bun 30 (H) 8 - 22 mg/dL    Creatinine 1.21 0.50 - 1.40 mg/dL    Calcium 7.6 (L) 8.5 - 10.5 mg/dL    Anion Gap 10.0 7.0 - 16.0   Magnesium    Collection Time:  21  2:08 AM   Result Value Ref Range    Magnesium 2.3 1.5 - 2.5 mg/dL   Phosphorus    Collection Time: 21  2:08 AM   Result Value Ref Range    Phosphorus 2.0 (L) 2.5 - 4.5 mg/dL   Heparin Xa (Unfractionated)    Collection Time: 21  2:08 AM   Result Value Ref Range    Heparin Xa (UFH) 0.53 IU/mL   ESTIMATED GFR    Collection Time: 21  2:08 AM   Result Value Ref Range    GFR If African American >60 >60 mL/min/1.73 m 2    GFR If Non African American 60 >60 mL/min/1.73 m 2   EKG    Collection Time: 21  6:53 AM   Result Value Ref Range    Report       [facility]    Test Date:  2021  Pt Name:    AILYN MCINTOSH                 Department: 161  MRN:        [id number]                      Room:       [facility]  Gender:     Male                         Technician: [facility]  :        1955                   Requested By:JO ANN GARCIA  Order #:    [id number]                    Reading MD: Josafat Cedeno MD    Measurements  Intervals                                Axis  Rate:       124                          P:          0  IN:         132                          QRS:        77  QRSD:       98                           T:          -62  QT:         332  QTc:        477    Interpretive Statements  ATRIAL FIBRILLATION  LOW VOLTAGE IN FRONTAL LEADS  BORDERLINE T ABNORMALITIES, INFERIOR LEADS  BORDERLINE PROLONGED QT INTERVAL  Compared to ECG 2021 16:01:31  NO SIGNIFICANT CHANGES  Electronically Signed On 2021 16:18:26 PST by Josafat Cedeno MD     ACCU-CHEK GLUCOSE    Collection Time: 21  7:03 AM   Result Value Ref Range    Glucose - Accu-Ck 149 (H) 65 - 99 mg/dL   Heparin Xa (Unfractionated)    Collection Time: 21  8:27 AM   Result Value Ref Range    Heparin Xa (UFH) 0.50 IU/mL   PROTHROMBIN TIME    Collection Time: 21  8:27 AM   Result Value Ref Range    PT 29.8 (H) 12.0 - 14.6 sec    INR 2.74 (H) 0.87 - 1.13   ACCU-CHEK GLUCOSE    Collection Time:  02/07/21 12:20 PM   Result Value Ref Range    Glucose - Accu-Ck 151 (H) 65 - 99 mg/dL   ACCU-CHEK GLUCOSE    Collection Time: 02/07/21  5:37 PM   Result Value Ref Range    Glucose - Accu-Ck 145 (H) 65 - 99 mg/dL   ACCU-CHEK GLUCOSE    Collection Time: 02/07/21  8:39 PM   Result Value Ref Range    Glucose - Accu-Ck 147 (H) 65 - 99 mg/dL       Cardiac Imaging and Procedures Review:    EKG and telemetry tracings personally reviewed    Impression and Medical Decision Making:  Principal Problem:    Acute respiratory failure with hypoxia (HCC) POA: Yes  Active Problems:    Sepsis (HCC) POA: Yes    Paroxysmal atrial fibrillation (HCC) POA: Yes    Atrial fibrillation with RVR (HCC) POA: Yes    Type 2 diabetes mellitus without complication, without long-term current use of insulin (HCC) POA: Yes    COPD (chronic obstructive pulmonary disease) (HCC) POA: Yes    Cellulitis of right lower extremity POA: Yes    Paroxysmal SVT (supraventricular tachycardia) ([facility]) POA: Yes    Anticoagulated POA: Yes    H/O mitral valve and aortic valve replacement POA: Yes    ESTELA (obstructive sleep apnea) POA: Yes    Pacemaker POA: Yes    Lactic acid acidosis POA: Yes    Encounter for monitoring amiodarone therapy (Chronic) POA: No  Resolved Problems:    * No resolved hospital problems. *    afib  amio loading PO  Anticoagulation    I personally discussed his case with  Dr Mckay Diaz    Future Appointments   Date Time Provider Department Center   2/17/2021  1:40 PM Sumanth Yancey M.D. [facility] None       It is my pleasure to participate in the care of Mr. Olivier.  Please do not hesitate to contact me with questions or concerns.    Josafat Cedeno MD PhD [facility]  Cardiologist [facility]    2/7/2021    Please note that this dictation was created using voice recognition software. I have worked with consultants from the vendor as well as technical experts from [facility] to optimize the interface. I have made  every reasonable attempt to correct obvious errors, but I expect that there are errors of grammar and possibly content I did not discover before finalizing the note.

## 2021-02-08 NOTE — ASSESSMENT & PLAN NOTE
Some wheezing noted  No crackles on PE  18-pack-year history  Patient now on ipratropium nebulizer

## 2021-02-08 NOTE — PROGRESS NOTES
Infectious Disease Progress Note    Author: Luciana Ingram M.D. Date & Time of service: 2021  9:59 AM    Chief Complaint:  Septic shock, RLE cellulitits. GAS bacteremia    Interval History:    Review of Systems:  Review of Systems   Constitutional: Positive for malaise/fatigue. Negative for chills and fever.   Gastrointestinal: Negative for abdominal pain, constipation, diarrhea, nausea and vomiting.   Musculoskeletal: Positive for myalgias.       Hemodynamics:  Temp (24hrs), Av.8 °C (98.3 °F), Min:36.6 °C (97.9 °F), Max:37.3 °C (99.1 °F)  Temperature: 37.3 °C (99.1 °F), Monitored Temp: 37.2 °C (98.9 °F)  Pulse  Av.8  Min: 78  Max: 193   Blood Pressure : 118/85       Physical Exam:  Physical Exam  Constitutional:       Appearance: Normal appearance. He is obese.   Cardiovascular:      Rate and Rhythm: Normal rate and regular rhythm.      Heart sounds: Murmur present.   Pulmonary:      Effort: Pulmonary effort is normal.      Breath sounds: Normal breath sounds.   Abdominal:      General: Abdomen is flat. Bowel sounds are normal.      Palpations: Abdomen is soft.   Musculoskeletal:         General: Tenderness present.      Right lower leg: Edema present.      Comments: Right lower leg with edema, erythema, mild tenderness and warmth.  Blistering noted on foot.   Skin:     General: Skin is warm and dry.   Neurological:      General: No focal deficit present.      Mental Status: He is alert and oriented to person, place, and time.   Psychiatric:         Mood and Affect: Mood normal.         Behavior: Behavior normal.         Meds:    Current Facility-Administered Medications:   •  potassium phosphate IVPB (CUSTOM)  •  acetaminophen  •  penicillin g  •  insulin regular **AND** POC Blood Glucose **AND** NOTIFY MD and PharmD **AND** dextrose 50%  •  enoxaparin (LOVENOX) injection  •  MD Alert...Warfarin per Pharmacy  •  [START ON 2021] amiodarone  •  amiodarone  •  senna-docusate **AND**  polyethylene glycol/lytes **AND** magnesium hydroxide **AND** bisacodyl  •  acetaminophen  •  warfarin  •  oxyCODONE immediate-release  •  morphine injection  •  Respiratory Therapy Consult  •  ipratropium-albuterol  •  MD Alert...Adult ICU Electrolyte Replacement per Pharmacy  •  labetalol  •  ondansetron  •  ondansetron    Labs:  Recent Labs     02/06/21 0420 02/07/21  0208 02/08/21  0250   WBC 14.7* 15.8* 12.4*   RBC 3.74* 3.41* 3.03*   HEMOGLOBIN 11.8* 10.8* 9.8*   HEMATOCRIT 35.7* 32.8* 29.8*   MCV 95.5 96.2 98.3*   MCH 31.6 31.7 32.3   RDW 61.6* 62.9* 64.0*   PLATELETCT 119* 124* 142*   MPV 11.8 11.0 10.8   NEUTSPOLYS 88.30* 87.30* 83.10*   LYMPHOCYTES 4.70* 5.70* 6.40*   MONOCYTES 5.00 4.60 4.80   EOSINOPHILS 0.10 0.30 1.10   BASOPHILS 0.40 0.30 0.50     Recent Labs     02/06/21 0420 02/07/21  0208 02/08/21  0250   SODIUM 136 139 134*   POTASSIUM 3.8 3.6 3.9   CHLORIDE 105 108 105   CO2 20 21 22   GLUCOSE 194* 141* 126*   BUN 37* 30* 25*     Recent Labs     02/06/21 0420 02/07/21  0208 02/08/21  0250   ALBUMIN  --   --  2.6*   TBILIRUBIN  --   --  0.7   ALKPHOSPHAT  --   --  52   TOTPROTEIN  --   --  5.5*   ALTSGPT  --   --  23   ASTSGOT  --   --  30   CREATININE 1.26 1.21 1.14       Imaging:  Dx-chest-limited (1 View)    Result Date: 2/5/2021 2/5/2021 5:29 PM HISTORY/REASON FOR EXAM:  CL placement TECHNIQUE/EXAM DESCRIPTION AND NUMBER OF VIEWS: Single AP view of the chest. COMPARISON: 2/5/2021 FINDINGS: Right central line projects over the SVC. Enteric tube passes below the level of the diaphragm. Endotracheal tube projects at the level of the aortic arch. There is right-sided pacer. Sternotomy wires are seen. The cardiomediastinal silhouette is stable.  There is blunting of the right costophrenic angle. There is hazy right basilar opacity which is unchanged. No pneumothorax is identified.     Right central line projects over the SVC. No pneumothorax. Small right pleural effusion with hazy right basilar  opacity which may represent atelectasis or pneumonitis. Stable prominence of the cardiomediastinal silhouette.     Dx-chest-portable (1 View)    Result Date: 2/8/2021 2/7/2021 11:30 PM HISTORY/REASON FOR EXAM:  For indication of respiratory failure; For indication of respiratory failure TECHNIQUE/EXAM DESCRIPTION AND NUMBER OF VIEWS: Single portable view of the chest. COMPARISON: Study from earlier the same date at 6:24 AM FINDINGS: Hardware is stably positioned in its visualized extent. HEART: Stable size. LUNGS: Lung volumes are low. There are perihilar opacities. There are bibasilar opacities. PLEURA: Neither costophrenic sulcus is well seen     1.  Increased BILATERAL atelectasis which could obscure an additional process such as pulmonary edema or pneumonia 2.  Possible BILATERAL pleural effusions    Dx-chest-portable (1 View)    Result Date: 2/7/2021 2/7/2021 6:12 AM HISTORY/REASON FOR EXAM:  For indication of respiratory failure; For indication of respiratory failure TECHNIQUE/EXAM DESCRIPTION AND NUMBER OF VIEWS: Single portable view of the chest. COMPARISON: Yesterday FINDINGS: The cardiomediastinal silhouettes are stable. There are ongoing bibasilar opacities, most likely atelectasis, worse on the right, as well as a small right pleural effusion. Endotracheal tube is no longer present. Right central venous line is unchanged in  position.     Stable bibasilar opacities, right greater than left.    Dx-chest-portable (1 View)    Result Date: 2/6/2021 2/6/2021 5:18 AM HISTORY/REASON FOR EXAM:  For indication of respiratory failure; For indication of respiratory failure TECHNIQUE/EXAM DESCRIPTION AND NUMBER OF VIEWS: Single portable view of the chest. COMPARISON: Yesterday FINDINGS: The cardiomediastinal silhouettes are stable. There is ongoing bibasilar atelectasis, mainly on the right, but with a small right pleural effusion. Support devices are positionally unchanged.     Stable chest findings compared to  2/5.    Dx-chest-portable (1 View)    Result Date: 2/5/2021 2/5/2021 5:22 AM HISTORY/REASON FOR EXAM:  For indication of respiratory failure; For indication of respiratory failure TECHNIQUE/EXAM DESCRIPTION AND NUMBER OF VIEWS: Single portable view of the chest. COMPARISON: Yesterday FINDINGS: Cardiac size is unchanged. Interstitial opacities persist in the mid and lower lung fields bilaterally, along with right basilar atelectasis and probable small right pleural effusion. Endotracheal tube is positionally unchanged.     Ongoing findings consistent with mild pulmonary edema, with right basilar atelectasis and small right pleural effusion. No interval change.    Dx-chest-portable (1 View)    Result Date: 2/4/2021 2/4/2021 7:55 PM HISTORY/REASON FOR EXAM:  Endotracheal tube placement TECHNIQUE/EXAM DESCRIPTION AND NUMBER OF VIEWS: Single portable view of the chest. COMPARISON: Prior image today at 1643 hours FINDINGS: An endotracheal tube is present, the tip of which is 2 to 3 cm above the corina. Overall cardiopulmonary appearance is unchanged.     Endotracheal tube in place as noted above. Stable cardiopulmonary appearance.    Dx-chest-portable (1 View)    Result Date: 2/4/2021 2/4/2021 4:38 PM HISTORY/REASON FOR EXAM:  possible sepsis.. Shortness of breath TECHNIQUE/EXAM DESCRIPTION AND NUMBER OF VIEWS: Single portable view of the chest. COMPARISON: December 17, 2020 FINDINGS: Patient is status post cardiac surgery. Single-lead right-sided pacemaker present. External pacer pads are present. There is right pleural fluid. There is vascular congestion. No pneumothorax.     Enlarged cardiac silhouette status post cardiac surgery with small right-sided pleural effusion and vascular congestion.    Us-extremity Venous Lower Bilat    Result Date: 2/5/2021   Vascular Laboratory  CONCLUSIONS  No superficial or deep venous thrombosis.  AILYN MCNITOSH  Exam Date:     02/05/2021 09:32  Room #:     Inpatient  Priority:      Stat  Ht (in):             Wt (lb):  Ordering Physician:        JO ANN GARCIA  Referring Physician:       [id number] Diamond  Sonographer:               Gladys Clark RVT  Study Type:                Complete Bilateral  Technical Quality:         Adequate  Age:    65    Gender:     M  MRN:    [id number]  :    1955      BSA:  Indications:     Edema, unspecified, Localized swelling, mass and lump,                   unspecified lower limb  CPT Codes:       02031  ICD Codes:       R60.9  R22.40  History:         Bilateral lower extremity swelling/edema. Prior study done                   12/15/20.  Limitations:  PROCEDURES:  Bilateral lower extremity venous duplex imaging.  The following venous structures were evaluated: common femoral, profunda  femoral, greater saphenous, femoral, popliteal , peroneal and posterior  tibial veins.  Serial compression, augmentation maneuvers,  color and spectral Doppler  flow evaluations were performed.  FINDINGS:  Bilateral lower extremities -  No superficial or deep venous thrombosis.  Complete color filling and compressibility with normal venous flow dynamics  including spontaneous flow, response to augmentation maneuvers, and  respiratory phasicity.  The peroneal and posterior tibial veins are difficult to assess for  compressibility, but flow response to augmentation is demonstrated.  Alo Mohamud MD  (Electronically Signed)  Final Date:      2021                   09:54    Ec-echocardiogram Complete W/o Cont    Result Date: 2021  Transthoracic Echo Report Echocardiography Laboratory CONCLUSIONS Mildly depressed left ventricular function.  Left ventricular ejection fraction is visually estimated to be 45%, likely affected by rapid heart rate. Endocardial borders not well visualized to accurate assess wall motion abnormalities. Recommend repeating limited study with contrast. The right ventricle was grossly normal in size and function. Severely  dilated left atrium. Known mitral valve bioprosthesis which is functioning normally, transvalvular gradient is 6.5 mmHg @. Moderate tricuspid regurgitation. Pleural effusion noted. Compared to the study done on 2020, LVEF is measured lower, likely affected by rapid heart rate. Otherwise, no significant changes. AILYN MCINTOSH Exam Date:         2021                    20:52 Exam Location:     Inpatient Priority:          Routine Ordering Physician:        FIORDALIZA FERGUSON                             ([id number]) Referring Physician:       MARTY Theodore Sonographer:               Jasbir Alston [facility] Age:    65     Gender:    M MRN:    [id number] :    1955 BSA:    2.29   Ht (in):    70     Wt (lb):    250 Exam Type:     Complete Indications:     Persistent atrial fibrillation ICD Codes:       I48.1 CPT Codes:       50378 BP:   94     /   66     HR: Technical Quality:       Poor MEASUREMENTS  (Male / Female) Normal Values 2D ECHO LV Diastolic Diameter PLAX        4.6 cm                4.2 - 5.9 / 3.9 - 5.3 cm LV Systolic Diameter PLAX         2.9 cm                2.1 - 4.0 cm IVS Diastolic Thickness           1.2 cm                LVPW Diastolic Thickness          1.3 cm                LVOT Diameter                     2.2 cm                LV Ejection Fraction MOD BP       54.8 %                >= 55  % LV Ejection Fraction MOD 4C       48.2 %                LV Ejection Fraction MOD 2C       54.3 %                IVC Diameter                      2.1 cm                M-MODE Aortic Root Diameter MM           4.3 cm                DOPPLER AV Peak Velocity                  1.1 m/s               AV Peak Gradient                  5.1 mmHg              AV Mean Gradient                  3.1 mmHg              LVOT Peak Velocity                0.85 m/s              AV Area Cont Eq vti               2.7 cm2               MV Velocity Time Integral         35 cm                 TR Peak Velocity                  249  cm/s              PV Peak Velocity                  0.99 m/s              PV Peak Gradient                  3.9 mmHg              RVOT Peak Velocity                0.82 m/s              * Indicates values subject to auto-interpretation LV EF:        % FINDINGS Left Ventricle Normal left ventricular size.  Mild concentric left ventricular hypertrophy.  Mildly depressed left ventricular function.  Left ventricular ejection fraction is visually estimated to be 45%. Endocardial borders not well visualized to accurate assess wall motion abnormalities.  Diastolic function indeterminate due to mitral valve replacement and atrial fibrillation. Right Ventricle The right ventricle was grossly normal in size and function. Right Atrium Enlarged right atrium. Left Atrium Severely dilated left atrium. Left atrial volume index is 80  mL/sq m. Mitral Valve Known mitral valve bioprosthesis which is functioning normally, transvalvular gradient is 6.5 mmHg @120. Aortic Valve Structurally normal aortic valve without significant stenosis or regurgitation. Tricuspid Valve Structurally normal tricuspid valve. No tricuspid stenosis. Moderate tricuspid regurgitation. Estimated right ventricular systolic pressure  is 35 mmHg. Pulmonic Valve Structurally normal pulmonic valve without significant stenosis or regurgitation. Pericardium No pericardial effusion seen. Pleural effusion noted. Aorta The aortic root is normal. Yogesh Galindo MD (Electronically Signed) Final Date:     04 February 2021                 22:48    Ec-lopez W/o Cont    Result Date: 2/6/2021  Transesophageal Echo Report Echocardiography Laboratory CONCLUSIONS No evidence of endocarditis. Mildly reduced left ventricular systolic function. Left ventricular ejection fraction is visually estimated to be 45%. Normal right ventricular systolic function. The left atrium is surgically absent. Known mitral valve bioprosthesis which is functioning normally with appropriate transvalvular  gradient. Structurally normal tricuspid valve without significant stenosis Moderate tricuspid regurgitation. Estimated right ventricular systolic pressure  is 35 mmHg. AILYN MCINTOSH Exam Date:          2021                     11:02 Exam Location:      Inpatient Priority:            Routine Ordering Physician:        JACK CEDENO  ([id number]) Referring Physician:       [id number] Moon Sonographer:               Yonis Wilder RDCS Age:    65     Gender:    M MRN:    [id number] :    1955 BSA:    2.31   Ht (in):    70     Wt (lb):    254 Report Type:      Complete Indications:     Endocarditis and heart valve disorders in diseases                  classified elsewhere ICD Codes:       I39 CPT Codes:       64193 BP:   107    /   77     HR: Technical Quality:       Good MEASUREMENTS  (Male / Female) Normal Values 2D ECHO Estimated LV Ejection Fraction    45 %                  * Indicates values subject to auto-interpretation LV EF:  45    % Medications The patient was sedated on a ventilator. An additional 2 mg IV versed was given, I personally supervised moderate sedation with the bedside ICU nurse from 11:05 to 11:16 AM Limitations none Complications none Proc. Components The probe was inserted and manipulated by Dr. Cedeno. Probe # [id number]  was used for this procedure. FINDINGS Left Ventricle Mildly reduced left ventricular systolic function. Left ventricular ejection fraction is visually estimated to be 45%. Right Ventricle Moderately dilated right ventricle. Normal right ventricular systolic function.  Pacer wire seen in right ventricle. Right Atrium The right atrium is normal in size.  Pacemaker wire present in the right atrial cavity. Left Atrium The left atrium is surgically absent. LA Appendage Normal left atrial appendage. IA Septum The interatrial septum is normal. IV Septum The interventricular septum is normal. Mitral Valve Known mitral  valve bioprosthesis which is functioning normally with appropriate transvalvular gradient. No valvular vegetations. Aortic Valve Structurally normal aortic valve without significant stenosis or regurgitation.  No valvular vegetations. Tricuspid Valve Structurally normal tricuspid valve without significant stenosis Moderate tricuspid regurgitation. Estimated right ventricular systolic pressure  is 35 mmHg.   No valvular vegetations. Pulmonic Valve Structurally normal pulmonic valve without significant stenosis or regurgitation. Pericardium Normal pericardium without effusion. Aorta The aortic root is normal. Josafat Cedeno MD (Electronically Signed) Final Date:     06 February 2021                 13:06      Micro:  Results     Procedure Component Value Units Date/Time    URINE CULTURE(NEW) [[id number]] Collected: 02/04/21 2148    Order Status: Completed Specimen: Urine Updated: 02/07/21 0826     Significant Indicator NEG     Source UR     Site -     Culture Result No growth at 48 hours.    Narrative:      Indication for culture:->Evaluation for sepsis without a  clear source of infection  Have you been in close contact with a person who is suspected  or known to be positive for COVID-19 within the last 30 days  (e.g. last seen that person < 30 days ago)->No  Indication for culture:->Evaluation for sepsis without a    BLOOD CULTURE [[id number]]  (Abnormal)  (Susceptibility) Collected: 02/04/21 1630    Order Status: Completed Specimen: Blood from Peripheral Updated: 02/07/21 0719     Significant Indicator POS     Source BLD     Site PERIPHERAL     Culture Result Growth detected by Bactec instrument. 02/05/2021  05:51      Streptococcus pyogenes (Group A)  This isolate does NOT demonstrate inducible Clindamycin  resistance in vitro.      Narrative:      CALL  Swain  19 tel. [phone],  CALLED  19 tel. [phone] 02/06/2021, 10:44, RB PERF. RESULTS CALLED TO:  [id number] RN  Per Hospital Policy: Only change Specimen Src:  "to \"Line\" if  specified by physician order.  No site indicated    Susceptibility     Streptococcus pyogenes (group a) (1)     Antibiotic Interpretation Microscan Method Status    Penicillin Sensitive 0.032 mcg/mL E-TEST Final    Cefotaxime Sensitive 0.023 mcg/mL E-TEST Final                   BLOOD CULTURE [[id number]] Collected: 02/06/21 2043    Order Status: Completed Specimen: Blood from Peripheral Updated: 02/07/21 0558     Significant Indicator NEG     Source BLD     Site PERIPHERAL     Culture Result No Growth  Note: Blood cultures are incubated for 5 days and  are monitored continuously.Positive blood cultures  are called to the RN and reported as soon as  they are identified.      Narrative:      Collected By:[id number] PRINCE CAS SKY  Per Hospital Policy: Only change Specimen Src: to \"Line\" if  specified by physician order.  Left Forearm/Arm    BLOOD CULTURE [[id number]] Collected: 02/06/21 2057    Order Status: Completed Specimen: Blood from Peripheral Updated: 02/07/21 0558     Significant Indicator NEG     Source BLD     Site PERIPHERAL     Culture Result No Growth  Note: Blood cultures are incubated for 5 days and  are monitored continuously.Positive blood cultures  are called to the RN and reported as soon as  they are identified.      Narrative:      Collected By:[id number] PRINCE CAS SKY  Per Hospital Policy: Only change Specimen Src: to \"Line\" if  specified by physician order.  Right Forearm/Arm    E-Test [[id number]] Collected: 02/04/21 1630    Order Status: Completed Specimen: Other Updated: 02/06/21 1045     ETEST Sensitivity INTERIM    Narrative:      19 tel. [phone] 02/06/2021, 10:44, RB PERF. RESULTS CALLED TO:  [id number] RN  Per Hospital Policy: Only change Specimen Src: to \"Line\" if  specified by physician order.    MRSA By PCR (Amp) [[id number]] Collected: 02/05/21 0930    Order Status: Completed Specimen: Respirate from Nares Updated: 02/05/21 1758     Significant Indicator NEG     Source RESP  " "    Site NARES     MRSA PCR Negative for MRSA by PCR.    Narrative:      Collected By:[id number] TERRY THOMAS  Collected By:[id number] TERRY THOMAS    Blood Culture [[id number]] Collected: 02/04/21 1757    Order Status: Completed Specimen: Blood from Peripheral Updated: 02/05/21 0632     Significant Indicator NEG     Source BLD     Site PERIPHERAL     Culture Result No Growth  Note: Blood cultures are incubated for 5 days and  are monitored continuously.Positive blood cultures  are called to the RN and reported as soon as  they are identified.      Narrative:      From different peripheral sites, if not done within the last  24 hours (Per Hospital Policy: Only change specimen source to  \"Line\" if specified by physician order)  No site indicated    Urinalysis [[id number]]  (Abnormal) Collected: 02/04/21 2148    Order Status: Completed Specimen: Urine, Fragoso Cath Updated: 02/04/21 2229     Color DK Yellow     Character Turbid     Specific Gravity 1.033     Ph 5.0     Glucose Negative mg/dL      Ketones Negative mg/dL      Protein 100 mg/dL      Bilirubin Moderate     Urobilinogen, Urine 0.2     Nitrite Negative     Leukocyte Esterase Trace     Occult Blood Large     Micro Urine Req Microscopic    Narrative:      If not done within the last 24 hours    Blood Culture [[id number]]     Order Status: Canceled Specimen: Blood from Peripheral     COV-2, FLU A/B, AND RSV BY PCR (2-4 HOURS CEPHEID): Collect NP swab in VTM [[id number]] Collected: 02/04/21 1710    Order Status: Completed Specimen: Respirate Updated: 02/04/21 1814     Influenza virus A RNA Negative     Influenza virus B, PCR Negative     RSV, PCR Negative     SARS-CoV-2 by PCR NotDetected     Comment: PATIENTS: Important information regarding your results and instructions can  be found at [email or web address]   \"After your  Covid-19 Test\"  [facility] providers: PLEASE REFER TO DE-ESCALATION AND RETESTING PROTOCOL  on [email or web address]  **The " [facility] GeneXpert Xpress SARS-CoV-2 Test has been made available for  use under the Emergency Use Authorization (EUA) only.          SARS-CoV-2 Source NP Swab    Narrative:      Indication for culture:->Evaluation for sepsis without a  clear source of infection  Have you been in close contact with a person who is suspected  or known to be positive for COVID-19 within the last 30 days  (e.g. last seen that person < 30 days ago)->No    BLOOD CULTURE [[id number]]     Order Status: Canceled Specimen: Blood from Peripheral     URINALYSIS [[id number]] Collected: 02/04/21 0000    Order Status: Canceled Specimen: Urine           Assessment:  Active Hospital Problems    Diagnosis   • *Acute respiratory failure with hypoxia ([facility]) [J96.01]   • Sepsis ([facility]) [A41.9]   • Cellulitis of right lower extremity [L03.115]   • Paroxysmal SVT (supraventricular tachycardia) ([facility]) [I47.1]   • COPD (chronic obstructive pulmonary disease) ([facility]) [J44.9]   • Type 2 diabetes mellitus without complication, without long-term current use of insulin ([facility]) [E11.9]   • Atrial fibrillation with RVR ([facility]) [I48.91]   • Paroxysmal atrial fibrillation ([facility]) [I48.0]   • Lactic acid acidosis [E87.2]   • Encounter for monitoring amiodarone therapy [Z51.81, Z79.899]   • Pacemaker [Z95.0]   • ESTELA (obstructive sleep apnea) [G47.33]   • H/O mitral valve and aortic valve replacement [Z95.2]   • Anticoagulated [Z79.01]     Interval 24 hours:      AF, O2 RA 2 L NC, pressors remain off  Labs reviewed  Imaging personally reviewed both images and report.   Studies reviewed  Micro reviewed    Patient states the leg still is painful but improving overall.  He is continued on IV penicillin as below.       IMPRESSION:   Septic shock, possible toxic shock syndrome, resolved       Group A streptococcus bacteremia.  Source right lower extremity cellulitis in setting of prior MVR and ICD  -KD on 2/6 with no vegetations noted on valves or pacemaker wires  Right lower extremity  cellulitis  Acute kidney injury, improving  Leukocytosis, ongoing but improving  Known bioprosthetic mitral valve replacement  Type 2 diabetes mellitus     PLAN:   Morales Olivier is a 65 y.o. obese man with a history of a bioprosthetic mitral valve replacement in March 2017, type 2 diabetes mellitus, pacemaker placement in July 2014, coronary artery disease and COPD admitted after being found down.  Patient admitted for septic shock secondary to severe right lower extremity cellulitis with group A streptococcal bacteremia.  Blood culture on 2/4 (1 out of 2 sets) + group A streptococcus.  Repeat blood cultures on 2/6 remain negative to date.  KD without any evidence of prosthetic valve or native valve endocarditis.     -- Continue IV penicillin but will increase the dose to 4,000,000 units every 4 -monitor closely as the patient has multiple comorbidities and is recently recovered from septic shock with ongoing significant cellulitis in right leg.  If any worsening will likely broaden antibiotics.   -- Okay to place midline   --- Antticipate a 14-day course of antibiotics from 2/6.  Stop date 2/20/2021     Discussed with ID pharmacy.  Will continue to follow

## 2021-02-08 NOTE — PROGRESS NOTES
Central line and Fragoso removed prior to transfer.  Pt belongings which includes keys, wallet and cellphone transferred with patient in bed.

## 2021-02-08 NOTE — PROGRESS NOTES
Hospital Medicine Daily Progress Note    Date of Service  2/8/2021    Chief Complaint  65 y.o. male admitted 2/4/2021 with chest pain, fall at home being down for 4 days    Hospital Course  65-year-old male with a history of atrial fibrillation, sick sinus syndrome, history of pacemaker placement, on anticoagulation, mitral valve replacement, coronary artery disease, redo mitral valve replacement in 2017, history of DVT, diabetes type 2, obesity, CKD, gout, presented from a independent living facility after patient was found down for 4 days, unable to get up from the floor apparently.  The patient was intubated, right lower extremities severe cellulitis was noted, treated for A. fib RVR initially.    Interval Problem Update  Patient seen and examined today. ICU Care  Care and plan discussed in IDT/Hot rounds.  Lines and assistive devices reviewed.    Patient tolerating treatment and therapies.  All Data, Medication data reviewed.  Case discussed with nursing as available.  Plan of Care reviewed with patient and notified of changes.    2/8 the patient is overall improved, he remains on antibiotic coverage through 2/24 his presentation with septic shock, group A streptococcus bacteremia, patient was successfully extubated, remains on amiodarone with p.o. loading, anticoagulation, afebrile, heart rate around 110, respirations 16, 2 L nasal cannula oxygen, normotensive currently.  With second 12.4, hemoglobin 9.8, sodium 134, INR is 3.04, the patient complains of generalized pain, right lower extremity pain, some nausea, abdominal protuberance but apparently had bowel movements, on Coumadin for anticoagulation.    Consultants/Specialty  Cardiology  Intensivist  ID  Code Status  Full Code    Disposition  TBD, likely will need rehab    Review of Systems  Review of Systems   Constitutional: Positive for malaise/fatigue. Negative for chills and fever.   HENT: Negative.    Eyes: Negative.    Respiratory: Negative.     Cardiovascular: Negative.  Negative for chest pain.   Gastrointestinal: Positive for abdominal pain and nausea.   Genitourinary: Negative.    Musculoskeletal: Positive for back pain and myalgias.   Skin: Negative.    Neurological: Negative.    Endo/Heme/Allergies: Negative.    Psychiatric/Behavioral: The patient is nervous/anxious.    All other systems reviewed and are negative.       Physical Exam  Temp:  [36.6 °C (97.9 °F)-37.3 °C (99.1 °F)] 37.3 °C (99.1 °F)  Pulse:  [] 116  Resp:  [14-25] 20  BP: ()/(61-85) 118/85  SpO2:  [93 %-99 %] 94 %    Physical Exam  Vitals signs and nursing note reviewed.   Constitutional:       Appearance: He is well-developed. He is obese. He is ill-appearing. He is not toxic-appearing.      Interventions: Nasal cannula in place.      Comments: Pt seen and examined.   HENT:      Head: Normocephalic and atraumatic.   Eyes:      Pupils: Pupils are equal, round, and reactive to light.   Neck:      Musculoskeletal: Normal range of motion and neck supple.   Cardiovascular:      Rate and Rhythm: Normal rate.      Heart sounds: Normal heart sounds.   Pulmonary:      Effort: Pulmonary effort is normal.      Breath sounds: Rhonchi present.   Abdominal:      General: Bowel sounds are normal.      Palpations: Abdomen is soft.   Genitourinary:     Penis: Normal.    Musculoskeletal: Normal range of motion.   Skin:     General: Skin is warm and dry.      Coloration: Skin is pale.      Findings: Bruising, erythema, lesion and rash present.   Neurological:      Mental Status: He is oriented to person, place, and time. He is lethargic.   Psychiatric:         Behavior: Behavior normal.         Fluids    Intake/Output Summary (Last 24 hours) at 2/8/2021 1127  Last data filed at 2/8/2021 0800  Gross per 24 hour   Intake 2037.98 ml   Output 1290 ml   Net 747.98 ml       Laboratory  Recent Labs     02/06/21  0420 02/07/21  0208 02/08/21  0250   WBC 14.7* 15.8* 12.4*   RBC 3.74* 3.41* 3.03*    HEMOGLOBIN 11.8* 10.8* 9.8*   HEMATOCRIT 35.7* 32.8* 29.8*   MCV 95.5 96.2 98.3*   MCH 31.6 31.7 32.3   MCHC 33.1* 32.9* 32.9*   RDW 61.6* 62.9* 64.0*   PLATELETCT 119* 124* 142*   MPV 11.8 11.0 10.8     Recent Labs     02/06/21  0420 02/07/21  0208 02/08/21  0250   SODIUM 136 139 134*   POTASSIUM 3.8 3.6 3.9   CHLORIDE 105 108 105   CO2 20 21 22   GLUCOSE 194* 141* 126*   BUN 37* 30* 25*   CREATININE 1.26 1.21 1.14   CALCIUM 7.3* 7.6* 7.8*     Recent Labs     02/07/21  0827 02/08/21  0250   INR 2.74* 3.04*               Imaging  DX-CHEST-PORTABLE (1 VIEW)   Final Result      1.  Increased BILATERAL atelectasis which could obscure an additional process such as pulmonary edema or pneumonia   2.  Possible BILATERAL pleural effusions      DX-CHEST-PORTABLE (1 VIEW)   Final Result      Stable bibasilar opacities, right greater than left.      EC-KD W/O CONT   Final Result      DX-CHEST-PORTABLE (1 VIEW)   Final Result      Stable chest findings compared to 2/5.      DX-CHEST-LIMITED (1 VIEW)   Final Result      Right central line projects over the SVC.      No pneumothorax.      Small right pleural effusion with hazy right basilar opacity which may represent atelectasis or pneumonitis.      Stable prominence of the cardiomediastinal silhouette.         US-EXTREMITY VENOUS LOWER BILAT   Final Result      DX-CHEST-PORTABLE (1 VIEW)   Final Result      Ongoing findings consistent with mild pulmonary edema, with right basilar atelectasis and small right pleural effusion. No interval change.      EC-ECHOCARDIOGRAM COMPLETE W/O CONT   Final Result      DX-CHEST-PORTABLE (1 VIEW)   Final Result      Endotracheal tube in place as noted above. Stable cardiopulmonary appearance.      DX-CHEST-PORTABLE (1 VIEW)   Final Result      Enlarged cardiac silhouette status post cardiac surgery with small right-sided pleural effusion and vascular congestion.           Assessment/Plan  * Acute respiratory failure with hypoxia (HCC)-  (present on admission)  Assessment & Plan  Likely combination of sepsis, A. fib RVR  Improved, oxygen weaning  I-S  Pulmonary toilet    Sepsis ([facility])- (present on admission)  Assessment & Plan  This is Severe Sepsis Present on admission  SIRS criteria identified on my evaluation include: Tachycardia, with heart rate greater than 90 BPM, Tachypnea, with respirations greater than 20 per minute, Leukocytosis, with WBC greater than 12,000 and Bandemia, greater than 10% bands  Source of infection is left lower extremity cellulitis  Clinical indicators of end organ dysfunction include Systolic blood pressure (SBP) <90 mmHg or mean arterial pressure <65 mmHg, Acute respiratory failure as evidenced by a new need for invasive or non-invasive mechanical ventilation (Ventilator or BiPap)  and Lactate >2 mmol/L (18.0 mg/dL)  Sepsis protocol initiated  Fluid resuscitation ordered per protocol  IV antibiotics as appropriate for source of sepsis: Unasyn change to Zosyn per Critical Care  Reassessment: I have reassessed the patient's hemodynamic status  End organ dysfunction include(s):  Acute respiratory failure and Acute kidney failure      Paroxysmal SVT (supraventricular tachycardia) ([facility])- (present on admission)  Assessment & Plan  Initially noted to be in supraventricular tachycardia changing to A. fib with RVR.    Cellulitis of right lower extremity- (present on admission)  Assessment & Plan  Strep a  On antibiotics  He consulted with plans for longer-term IV penicillin      COPD (chronic obstructive pulmonary disease) ([facility])- (present on admission)  Assessment & Plan  You have, currently no wheezing, respiratory therapy    Type 2 diabetes mellitus without complication, without long-term current use of insulin ([facility])- (present on admission)  Assessment & Plan  Sliding scale insulin, most recent hemoglobin A1c 7.6    Atrial fibrillation with RVR ([facility])- (present on admission)  Assessment & Plan  Amiodarone  therapy  Anticoagulation process  Low-dose beta-blockade    Encounter for monitoring amiodarone therapy  Assessment & Plan  P.o. loading dose    Lactic acid acidosis- (present on admission)  Assessment & Plan  Secondary to tissue ischemia in setting of severe sepsis.  Resolved    Pacemaker- (present on admission)  Assessment & Plan  History of    ESTELA (obstructive sleep apnea)- (present on admission)  Assessment & Plan  History of    H/O mitral valve and aortic valve replacement- (present on admission)  Assessment & Plan  Echocardiogram noted    Anticoagulated- (present on admission)  Assessment & Plan  Ongoing Coumadin titration     Plan  Gentle pain control  Anticoagulation, Lovenox with Coumadin  Antiarrhythmic therapy, amiodarone, beta-blocker for hopefully improved rate control  Continued IV antibiotic therapy  Wound care  Significant debility, likely will need rehab/SNF once improved  VTE prophylaxis: Lovenox    I have performed a physical exam and reviewed and updated ROS and Plan today . In review of yesterday's note , there are no changes except as documented above.        Please note that this dictation was created using voice recognition software. I have made every reasonable attempt to correct obvious errors, but I expect that there are errors of grammar and possibly context that I did not discover before finalizing the note.    This patient was seen under COVID 19 pandemic disaster response conditions.  During a disaster, the provisions of care is subject to the Crisis Standard of Care

## 2021-02-08 NOTE — PROGRESS NOTES
Critical care progress note:  There is no critical care management currently.  Patient is off of amiodarone drip and on oral amiodarone.  Afib 110-116.  Critical care to sign off, please call back if clinically deteriorates or requires critical care management.  Discussed with hospitalist.

## 2021-02-08 NOTE — PROGRESS NOTES
Pharmacy Warfarin Monitoring     Date: 2/8/2021  Reason for Anticoagulation: Atrial Fibrillation, Deep Vein Thrombosis, Aortic Mechanical Valve Replacement  , Mechanical Mitral Valve Replacement     Target INR: 2.5 to 3.5     Hemoglobin Value: (!) 9.8  Hematocrit Value: (!) 29.8  Lab Platelet Value: (!) 142    INR from last 7 days     Date/Time INR Value    02/08/21 0250  (!) 3.04    02/07/21 0827  (!) 2.74    02/04/21 1615  (!) 1.64        Dose from last 7 days     Date/Time Dose (mg)    02/08/21 1404  2    02/07/21 1606  2.5        Home dose:  21 mg weekly  Significant Interactions: Amiodarone, Antibiotics  Bridge Therapy: Yes, completed  INR Value Greater than 2 Prior to Discontinuation of Parenteral Anticoagulation: Yes    Reversal Agent Administered: Not Applicable    Comments: Continuation of home warfarin.  Bridge therapy discontinued today as INR therapeutic x 2.  Dose decreased from home regimen due to new DDI with amiodarone.  Decrease to H/H today but no noted s/sx of bleeding.  Pharmacy to follow daily during admission.    Education Material Provided?: No - Chronic warfarin patient  Pharmacist suggested discharge dosing: TBD pending response to warfarin with new DDI (amiodarone).  Anticipating dose reduction from home, suggest 2 mg daily with INR within 72 hours of discharge.     Thank you!  Dina Jerome, PharmD, BCCCP

## 2021-02-08 NOTE — PROGRESS NOTES
Pharmacy Warfarin Monitoring     Date: 2/7/2021  Reason for Anticoagulation: Atrial Fibrillation, Deep Vein Thrombosis, Aortic Mechanical Valve Replacement  , Mechanical Mitral Valve Replacement     Target INR: 2.5 to 3.5      Hemoglobin Value: (!) 10.8  Hematocrit Value: (!) 32.8  Lab Platelet Value: (!) 124    INR from last 7 days     Date/Time INR Value    02/07/21 0827  (!) 2.74    02/04/21 1615  (!) 1.64        Dose from last 7 days     Date/Time Dose (mg)    02/07/21 1606  2.5        Home dose: 21 mg weekly  Significant Interactions: Amiodarone, Antibiotics  Bridge Therapy: Yes - therapeutic lovenox  INR Value Greater than 2 Prior to Discontinuation of Parenteral Anticoagulation: Not Applicable, remains on bridge therapy    Reversal Agent Administered: Not Applicable    Comments: Continuation of home warfarin following heparin infusion upon arrival to ED for subtherapeutic INR.  Bridge to continue with lovenox while warfarin resumes - INR therapeutic this morning but will await 2 therapeutic INRs due to subtherapeutic INR on arrival.  Weekly dose PTA is 21 mg, will decrease slightly to 2.5 mg daily to account for new DDI with amiodarone.  No noted s/sx of bleeding.  Pharmacy to follow daily during admission.    Education Material Provided?: No - Chronic warfarin patient  Pharmacist suggested discharge dosing: TBD pending response to new DDI with amiodarone.  Anticipate 2 - 2.5 mg daily with INR within 72 hours of discharge.     Thank you!  Dina Jerome, PharmD, BCCCP

## 2021-02-08 NOTE — PROGRESS NOTES
Pt tx from ICU this shift. Aox4, c/o pain in LLE given medication per MAR. VSS, 2L NC. Placed on tele monitor, aflutter rate controlled. Bed locked, low, alarm set and call bell within reach.

## 2021-02-08 NOTE — PROGRESS NOTES
Cardiology Follow Up Progress Note    Date of Service  2/8/2021    Attending Physician  Sebastian Daley M.D.    Chief Complaint   Found down     Cardiology consult   SVT    HPI  Morales Olivier is a 65 y.o. male admitted 2/4/2021 with found down on the ground for unknown amount of time. Found to have a Heart rate of 190 SVT, complaint of dyspnea, right leg pain, heart rate did not significantly improve with adenosine and diltiazem. Worsening respiratory status status post intubation 2/4/2020 at 7 PM. Recent diagnosis of left lower extremity cellulitis, was hospitalized on 12/15/2020-12/24/2020.  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 Parnell on 3/8/2017, hypertension, DVT, type 2 diabetes.    Interim Events  Patient extubated on nasal cannula. Complains of dizziness, non positional.     afib 110-120  H/h 9.8/29.8    K 3.9    Review of Systems  Review of Systems   Constitutional: Negative for chills, diaphoresis and fever.   HENT: Negative for nosebleeds and trouble swallowing.    Respiratory: Negative for cough, chest tightness and shortness of breath.    Cardiovascular: Negative for chest pain, palpitations and leg swelling.   Gastrointestinal: Negative for abdominal distention, abdominal pain and blood in stool.   Genitourinary: Negative for hematuria.   Musculoskeletal: Positive for back pain and joint swelling.   Skin: Positive for wound. Negative for color change.   Neurological: Positive for dizziness. Negative for syncope and numbness.   Psychiatric/Behavioral: Negative for agitation and confusion. The patient is not nervous/anxious.        Vital signs in last 24 hours  Temp:  [36.6 °C (97.9 °F)-37.3 °C (99.1 °F)] 37.3 °C  (99.1 °F)  Pulse:  [] 116  Resp:  [14-25] 20  BP: ()/(61-85) 118/85  SpO2:  [93 %-99 %] 94 %    Physical Exam  Physical Exam  Vitals signs and nursing note reviewed.   Constitutional:       Appearance: Normal appearance. He is ill-appearing.   HENT:      Head: Normocephalic and atraumatic.   Eyes:      Pupils: Pupils are equal, round, and reactive to light.   Neck:      Musculoskeletal: Normal range of motion.   Cardiovascular:      Rate and Rhythm: Tachycardia present. Rhythm irregular.      Heart sounds: Normal heart sounds. No murmur.   Pulmonary:      Effort: Pulmonary effort is normal.      Breath sounds: Normal breath sounds.   Abdominal:      General: Abdomen is flat.   Musculoskeletal:      Right lower leg: Edema present.      Left lower leg: Edema present.   Skin:     General: Skin is warm and dry.      Findings: Wound present.   Neurological:      General: No focal deficit present.      Mental Status: He is alert and oriented to person, place, and time.   Psychiatric:         Mood and Affect: Mood normal.         Behavior: Behavior normal.         Thought Content: Thought content normal.         Judgment: Judgment normal.         Lab Review  Lab Results   Component Value Date/Time    WBC 12.4 (H) 02/08/2021 02:50 AM    RBC 3.03 (L) 02/08/2021 02:50 AM    HEMOGLOBIN 9.8 (L) 02/08/2021 02:50 AM    HEMATOCRIT 29.8 (L) 02/08/2021 02:50 AM    MCV 98.3 (H) 02/08/2021 02:50 AM    MCH 32.3 02/08/2021 02:50 AM    MCHC 32.9 (L) 02/08/2021 02:50 AM    MPV 10.8 02/08/2021 02:50 AM      Lab Results   Component Value Date/Time    SODIUM 134 (L) 02/08/2021 02:50 AM    POTASSIUM 3.9 02/08/2021 02:50 AM    CHLORIDE 105 02/08/2021 02:50 AM    CO2 22 02/08/2021 02:50 AM    GLUCOSE 126 (H) 02/08/2021 02:50 AM    BUN 25 (H) 02/08/2021 02:50 AM    CREATININE 1.14 02/08/2021 02:50 AM    CREATININE 1.4 04/27/2009 10:08 AM      Lab Results   Component Value Date/Time    ASTSGOT 30 02/08/2021 02:50 AM    ALTSGPT 23  02/08/2021 02:50 AM     Lab Results   Component Value Date/Time    CHOLSTRLTOT 126 11/20/2020 05:49 PM    LDL 61 11/20/2020 05:49 PM    HDL 22 (A) 11/20/2020 05:49 PM    TRIGLYCERIDE 217 (H) 11/20/2020 05:49 PM    TROPONINT 20 (H) 02/04/2021 04:15 PM       No results for input(s): NTPROBNP in the last 72 hours.    Cardiac Imaging and Procedures Review  Echocardiogram:    2/6/2021  No evidence of endocarditis.  Mildly reduced left ventricular systolic function.  Left ventricular ejection fraction is visually estimated to be 45%.  Normal right ventricular systolic function.  The left atrium is surgically absent.  Known mitral valve bioprosthesis which is functioning normally with   appropriate transvalvular gradient.  Structurally normal tricuspid valve without significant stenosis   Moderate tricuspid regurgitation.  Estimated right ventricular systolic pressure  is 35 mmHg.      Venous duplex  2/5/2021  No superficial or deep venous thrombosis.    MPI   11/21/2020  There is mild global hypokinesis.   No evidence of infarct or reversible ischemia.   ECG INTERPRETATION   Nondiagnostic ECG portion of a Regadenoson nuclear stress test.     Assessment/Plan  No new Assessment & Plan notes have been filed under this hospital service since the last note was generated.  Service: Cardiology    1. A. fib RVR in the setting of sepsis/bacteremia/history of A. fib  - rate 110-120, metoprolol was added 25mg BID.   - Continue amiodarone 400 mg p.o. twice daily x2 weeks, then 400 mg p.o. daily x2 weeks, then 200 mg daily.  - continue weight based lovenox to warfarin   - ekg today showed     - ECHO showed mildly depressed EF, 45% , likely secondary to rapid heart rate, as well as substance      2. Acute respiratory failure with hypoxia  VDRF  -improved      3. Sepsis, cultures positive for group A strep bacteremia  - Recent history of left lower leg cellulitis 12/2020  - Now with right lower extremity erythema consistent with  cellulitis  - Currently on Zosyn     4. Known history of mitral valve replacement 2008 with redo in 2017  - Known mitral valve bioprosthesis which is functioning normally   - KD showed no valvular vegetations noted.      5. [employer] pacemaker in situ, 2014  - Appears to be functioning well    Future Appointments   Date Time Provider Department Center   2/17/2021  1:40 PM Sumanth Yancey M.D. [facility] None     Thank you for allowing me to participate in the care of this patient.  I will continue to follow this patient    Please contact me with any questions.    VINCENZO Sharma   [facility]

## 2021-02-08 NOTE — RESPIRATORY CARE
COPD EDUCATION by COPD CLINICAL EDUCATOR  2/8/2021 at 9:43 AM by Colleen Colin, RRT   COPD EDUCATION by COPD CLINICAL EDUCATOR  2/8/2021  at  9:43 AM by Colleen Colin, RRT     Patient interviewed by COPD education team.  Patient unable to participate in full program.  Short intervention has been conducted.  A comprehensive packet including information about COPD, treatments, and smoking cessation given. Per notation and [facility] outpatient follow up: patient has significant ESTELA. PFT in 2018 shows mild obstruction and suggestive of Asthma.  He is obese. He states he is not on any medications for his breathing.  He's frustrated and feels unwell and declined further discussion    Action Plan Completed/Updated? No not currently on medications    Pulmonary Function Testing (PFT)? 2018 (EM[facility]) FEV1 72%; FEV1/ FVC 69%    Does patient currently use inhalers/nebulizer at home? None at this time    Additional medication/equipment recommendations ESTELA -follow up and treatment    Is all Respiratory DME in place? None at this time follow for Oxygen needs at discharge                    Have all necessary follow up appointments been scheduled?   PCP or D/C clinic pending [id number]   Specialty Cardiology 2/17 (in EMR)  He declined assist with Pulmonary appt    Palliative Care Team involved in care, been suggested or consulted? Not at this time    Has the patient been referred to Pulmonary Rehab? no    Has the patient been referred to the [facility] COPD Program? n/a    Home Health referral placed? Pending discharge needs  Recommended? Yes d/w RN

## 2021-02-08 NOTE — THERAPY
"Occupational Therapy  Daily Treatment     Patient Name: Morales Olivier  Age:  65 y.o., Sex:  male  Medical Record #: [id number]  Today's Date: 2/8/2021     Precautions  Precautions: (P) Fall Risk  Comments: afib with RVR, HR goal during activity 138    Assessment    Pt seen for follow up OT tx session, session limited due to patients pain as well as active bloody drainage from RLE which RN was notified of after returning patient to supine position, pts participation minimal due to pain as well as lethargy. Will continue to see patient while admitted for skilled therapy to address deficits as well as recommend post-acute placement to maximize functional independence.    Plan    Continue current treatment plan.    DC Equipment Recommendations: (P) Unable to determine at this time  Discharge Recommendations: (P) Recommend post-acute placement for additional occupational therapy services prior to discharge home    Subjective    \"I get up and I start leaking\"     Objective       02/08/21 1538   Precautions   Precautions Fall Risk   Pain 0 - 10 Group   Therapist Pain Assessment During Activity  (unable to quantify, pain in joints, notable swelling BLEs)   Cognition    Cognition / Consciousness X   Level of Consciousness Alert   Attention Impaired   Comments cooperative, sleepy, audible with pain   Active ROM Upper Body   Comments limited bilateral shoulder ROM   Strength Upper Body   Upper Body Strength  X   Gross Strength Generalized Weakness, Equal Bilaterally.    Sensation Upper Body   Upper Extremity Sensation  WDL   Other Treatments   Other Treatments Provided Notable swelling throughout BUEs/BLEs with patient complaining of pain in joints   Balance   Sitting Balance (Static) Fair   Sitting Balance (Dynamic) Poor +   Weight Shift Sitting Fair   Skilled Intervention Verbal Cuing   Comments unable to stand   Bed Mobility    Supine to Sit Moderate Assist   Sit to Supine Moderate Assist   Scooting Moderate Assist "   Rolling Moderate Assist to Rt.   Activities of Daily Living   Upper Body Dressing Maximal Assist   How much help from another person does the patient currently need...   Putting on and taking off regular lower body clothing? 1   Bathing (including washing, rinsing, and drying)? 2   Toileting, which includes using a toilet, bedpan, or urinal? 2   Putting on and taking off regular upper body clothing? 2   Taking care of personal grooming such as brushing teeth? 3   Eating meals? 1   6 Clicks Daily Activity Score 11   Functional Mobility   Sit to Stand Unable to Participate   Bed, Chair, Wheelchair Transfer Unable to Participate   Toilet Transfers Unable to Participate   Mobility limited to bed mobility only due to pain all over and active drainage from RLE   Skilled Intervention Verbal Cuing   Activity Tolerance   Sitting Edge of Bed 10   Patient / Family Goals   Patient / Family Goal #1 Feel better   Goal #1 Outcome Progressing slower than expected   Short Term Goals   Short Term Goal # 1 min A with UB dressing   Goal Outcome # 1 Progressing slower than expected   Short Term Goal # 2 mod A with LB dressing   Goal Outcome # 2 Progressing slower than expected   Short Term Goal # 3 mod A with ADl txfs   Goal Outcome # 3 Progressing slower than expected   Education Group   Education Provided Role of Occupational Therapist   Role of Occupational Therapist Patient Response Patient;Acceptance;Explanation;Reinforcement Needed   Anticipated Discharge Equipment and Recommendations   DC Equipment Recommendations Unable to determine at this time   Discharge Recommendations Recommend post-acute placement for additional occupational therapy services prior to discharge home   Interdisciplinary Plan of Care Collaboration   IDT Collaboration with  Nursing   Patient Position at End of Therapy In Bed;Bed Alarm On;Call Light within Reach;Tray Table within Reach;Phone within Reach   Collaboration Comments RN updated

## 2021-02-08 NOTE — ASSESSMENT & PLAN NOTE
Recent A1c 7.6  Glucose 140s to 150s  Patient is not on home insulin  SSI lispro  Likely will not need insulin therapy at home

## 2021-02-08 NOTE — PROGRESS NOTES
2 RN Skin Assessment   Completed with RN Janeth     Devices in place: silicone oxygen tubing   Skin assessment under devices: intact     Wounds present on transfer to Floor:  Lips: dried blood; scabbed  Coccyx: Nonblanchable DTI  LLE: Weeping, blisters, red, hot, scattered black     Interventions in place: pillows in place for positioning, silicone tubing, barrier paste

## 2021-02-09 PROBLEM — Z95.0 PACEMAKER: Status: RESOLVED | Noted: 2020-01-01 | Resolved: 2021-01-01

## 2021-02-09 PROBLEM — Z51.81 ENCOUNTER FOR MONITORING AMIODARONE THERAPY: Status: ACTIVE | Noted: 2021-01-01

## 2021-02-09 PROBLEM — E87.70 FLUID OVERLOAD: Chronic | Status: ACTIVE | Noted: 2021-01-01

## 2021-02-09 PROBLEM — Z51.81 ENCOUNTER FOR MONITORING AMIODARONE THERAPY: Status: RESOLVED | Noted: 2021-01-01 | Resolved: 2021-01-01

## 2021-02-09 PROBLEM — Z79.899 ENCOUNTER FOR MONITORING AMIODARONE THERAPY: Status: RESOLVED | Noted: 2021-01-01 | Resolved: 2021-01-01

## 2021-02-09 PROBLEM — E87.70 FLUID OVERLOAD: Status: ACTIVE | Noted: 2021-01-01

## 2021-02-09 PROBLEM — Z79.899 ENCOUNTER FOR MONITORING AMIODARONE THERAPY: Status: ACTIVE | Noted: 2021-01-01

## 2021-02-09 NOTE — ASSESSMENT & PLAN NOTE
Likely secondary to IV fluids given per sepsis protocol  Patient now presents with pitting edema to hips  Patient does not look septic  WBC improved since admission   No signs of sepsis. Without tachycardia or tachypnea or fever  Blood pressure stable  Continue Lasix 40 IV twice daily  Continuous ins and outs  Daily weights  Elevate legs

## 2021-02-09 NOTE — CARE PLAN
Problem: Communication  Goal: The ability to communicate needs accurately and effectively will improve  Outcome: MET     Problem: Safety  Goal: Will remain free from injury  Outcome: MET  Goal: Will remain free from falls  Outcome: MET

## 2021-02-09 NOTE — WOUND TEAM
[facility]  Inpatient Services  Initial Wound and Skin Care Evaluation    Admission Date: 2021     Last order of IP CONSULT TO WOUND CARE was found on 2021 from Hospital Encounter on 2021     HPI, PMH, SH: Reviewed    Past Surgical History:   Procedure Laterality Date   • MITRAL VALVE REPLACE  3/8/2017    Procedure: MITRAL VALVE REPLACE-REDO;  Surgeon: Cornelia Wright M.D.;  Location: SURGERY [facility];  Service:    • KD  3/8/2017    Procedure: KD;  Surgeon: Cornelia Wright M.D.;  Location: SURGERY [facility];  Service:    • IRRIGATION & DEBRIDEMENT GENERAL  2009    Performed by MICHEL URBINA at SURGERY [facility]   • WIDE EXCISION  2009    Performed by MICHEL URBINA at [facility]   • ANGIOGRAM  2009    Performed by MICHEL URBINA at [facility]   • CATH REMOVAL  2009    Performed by MICHEL URBINA at [facility]   • THROMBECTOMY  2009    Performed by MICHEL URBINA at SURGERY [facility]   • EMBOLECTOMY  2009    Performed by MICHEL URBINA at SURGERY [facility]   • ANGIOGRAM  7/3/2009    Performed by MORGAN WARD at SURGERY [facility]   • MITRAL VALVE REPLACE  3/14/08    Performed by CORNELIA WRIGHT at SURGERY [facility]   • MAZE PROCEDURE  3/14/08    Performed by CORNELIA WRIGHT at SURGERY [facility]   • OTHER CARDIAC SURGERY  2008    mitral valve replacement   • AORTIC VALVE REPLACEMENT     • OTHER ABDOMINAL SURGERY      imbulica repair      Social History     Tobacco Use   • Smoking status: Former Smoker     Packs/day: 2.50     Years: 9.00     Pack years: 22.50     Types: Cigarettes     Quit date: 1981     Years since quittin.1   • Smokeless tobacco: Never Used   Substance Use Topics   • Alcohol use: No     Chief Complaint   Patient presents with   • Chest Pain   • Shortness of Breath   • Fall Less than 10 Feet     down for 2-4 days at an [facility].        Diagnosis: Acute respiratory failure with hypoxia (HCC) [J96.01]  Cellulitis and abscess of right leg [L03.115, L02.415]    Unit where seen by Wound Team: T816/01     WOUND CONSULT/FOLLOW UP RELATED TO:  RLE/sacrum/lip.  Sacrum noted to be pink/intact and blanching.  Lips likely related to dry scabbing.                       WOUND HISTORY:  Unknown etiology.  Admitted with cellulitis.    WOUND ASSESSMENT/LDA  Wound 02/08/21 Soft Tissue Necrosis Leg Right cellulitis/vasculitis?  (Active)   Wound Image     02/08/21 1700   Site Assessment Purple;Red;Edema    Periwound Assessment Purple;Edema    Margins Unattached edges    Closure None    Drainage Amount Large    Drainage Description Serous;Yellow;Serosanguineous    Treatments Cleansed;Site care    Wound Cleansing Approved Wound Cleanser    Periwound Protectant Skin Protectant Wipes to Periwound    Dressing Cleansing/Solutions Not Applicable    Dressing Options Petrolatum Gauze (yellow);Absorbent Abdominal Pad;Dry Roll Gauze    Dressing Changed New    Dressing Status Clean;Dry;Intact    Dressing Change/Treatment Frequency Every Shift, and As Needed    NEXT Dressing Change/Treatment Date 02/08/21    NEXT Weekly Photo (Inpatient Only) 02/15/21    Pulses Other (Comments)    WOUND NURSE ONLY - Time Spent with Patient (mins) 60    Number of days: 0        Vascular:    MARJORIE:   No results found.    Lab Values:    Lab Results   Component Value Date/Time    WBC 12.4 (H) 02/08/2021 02:50 AM    RBC 3.03 (L) 02/08/2021 02:50 AM    HEMOGLOBIN 9.8 (L) 02/08/2021 02:50 AM    HEMATOCRIT 29.8 (L) 02/08/2021 02:50 AM    CREACTPROT 3.47 (H) 12/19/2019 12:26 PM    SEDRATEWES 65 (H) 02/04/2021 09:40 PM    HBA1C 7.6 (H) 11/20/2020 09:41 AM        Culture Results show:  No results found for this or any previous visit (from the past 720 hour(s)).    Pain Level/Medicated:  Pain with cleansing of leg.  Tolerated without pain medication.         INTERVENTIONS BY WOUND TEAM:  Chart and images  reviewed. Discussed with bedside RN. This RN in to assess patient. Performed standard wound care which includes appropriate positioning, dressing removal and non-selective debridement. Pictures and measurements obtained weekly if/when required.  Preparation for Dressing removal: NA  Cleansed with:  wound cleanser and gauze.  Sharp debridement: NA  Jewels wound: Cleansed with wound cleanser, Prepped with skin prep   Primary Dressing: xeroform   Secondary (Outer) Dressing: ABD and roll gauze     Interdisciplinary consultation: Patient, Bedside RN, wound RN Omar     EVALUATION / RATIONALE FOR TREATMENT:  Most Recent Date:  02/08: admitted with  Cellulitis and RLE abscess.  May benefit from further imaging to see extent of abscess/fluid accumulation.  Xeroform applied for bacteriostatic and non adherent.  ABD and roll gauze for absorption.         Goals: Steady decrease in wound area and depth weekly.    WOUND TEAM PLAN OF CARE ([X] for frequency of wound follow up,):   Nursing to follow orders written for wound care. Contact wound team if area fails to progress, deteriorates or with any questions/concerns  Dressing changes by wound team:                   Follow up 3 times weekly:                NPWT change 3 times weekly:     Follow up 1-2 times weekly:    X  Follow up Bi-Monthly:                   Follow up as needed:     Other (explain):     NURSING PLAN OF CARE ORDERS (X):  Dressing changes: See Dressing Care orders: X  Skin care: See Skin Care orders:   RN Prevention Protocol: X  Rectal tube care: See Rectal Tube Care orders:   Other orders:    RSKIN:   CURRENTLY IN PLACE (X), APPLIED THIS VISIT (A), ORDERED (O):   Q shift Riaz:  X  Q shift pressure point assessments:  X    Surface/Positioning   Pressure redistribution mattress          X  Low Airloss          Bariatric foam      Bariatric GUSTABO     Waffle cushion        Waffle Overlay        O  Reposition q 2 hours    O  TAPs Turning system     Z Juan Pillow          Offloading/Redistribution   Sacral Mepilex (Silicone dressing)   X  Heel Mepilex (Silicone dressing)       O  Heel float boots (Prevalon boot)             Float Heels off Bed with Pillows       O    Respiratory   Silicone O2 tubing       X  Gray Foam Ear protectors     Cannula fixation Device (Tender )          High flow offloading Clip    Elastic head band offloading device      Anchorfast                                                         Trach with Optifoam split foam             Containment/Moisture Prevention     Rectal tube or BMS    Purwick/Condom Cath        Fragoso Catheter    Barrier wipes           Barrier paste       Antifungal tx      Interdry        Mobilization       Up to chair        Ambulate      PT/OT      Nutrition       Dietician        Diabetes Education      PO     TF     TPN     NPO   # days     Other        Anticipated discharge plans: TBA wound benefit from wound care post DC   LTACH:        SNF/Rehab:                  Home Health Care:           Outpatient Wound Center:            Self/Family Care:        Other:

## 2021-02-09 NOTE — PROGRESS NOTES
Inpatient Anticoagulation Service Note  Date: 2/9/2021  Reason for Anticoagulation: Atrial Fibrillation, Deep Vein Thrombosis, Mechanical Mitral Valve Replacement  , Aortic Mechanical Valve Replacement     Hemoglobin Value: (!) 9.5  Hematocrit Value: (!) 28.7  Lab Platelet Value: 166  Target INR: 2.5 to 3.5  INR from last 7 days     Date/Time INR Value    02/09/21 0218  (!) 4.47    02/08/21 0250  (!) 3.04    02/07/21 0827  (!) 2.74    02/04/21 1615  (!) 1.64        Dose from last 7 days     Date/Time Dose (mg)    02/09/21 1347  0    02/08/21 1404  2    02/07/21 1606  2.5        Average Dose (mg): TBD (Home Dose: 2 mg Th + 4 mg We + 5 mg ROW per chart review)  Significant Interactions: Amiodarone, Antibiotics  Bridge Therapy: No  Days of Overlap Therapy: 1   INR Value Greater than 2 Prior to Discontinuation of Parenteral Anticoagulation: Yes  Reversal Agent Administered: Not Applicable  Comments: INR increased to above goal today. No overt bleeding noted per chart review. H/H low. PLT improved. Warfarin interactions noted. Will HOLD warfarin today. INR with AM labs. May need ongoing dose adjusments.    Plan: HOLD warfarin 2/9/21  Education Material Provided?: No (chronic warfarin patient)  Pharmacist suggested discharge dosing: warfarin 1 mg daily    Joey Hathaway, PharmD

## 2021-02-09 NOTE — PROGRESS NOTES
Infectious Disease Progress Note    Author: Luciana Ingram M.D. Date & Time of service: 2021  2:35 PM    Chief Complaint:  Septic shock, RLE cellulitits. GAS bacteremia     Interval History:   AF, O2 RA 2 L NC, pressors remain off. States the leg still is painful but improving overall.  He is continued on IV penicillin as below.     Review of Systems:  Review of Systems   Constitutional: Positive for malaise/fatigue. Negative for fever.   Respiratory: Negative for cough and shortness of breath.    Gastrointestinal: Negative for abdominal pain, constipation, diarrhea, nausea and vomiting.   Musculoskeletal: Positive for myalgias.       Hemodynamics:  Temp (24hrs), Av.6 °C (97.8 °F), Min:36.3 °C (97.3 °F), Max:36.8 °C (98.2 °F)  Temperature: 36.6 °C (97.8 °F)  Pulse  Av.1  Min: 78  Max: 193   Blood Pressure : 112/76       Physical Exam:  Physical Exam  Constitutional:       Appearance: Normal appearance. He is obese.   Cardiovascular:      Rate and Rhythm: Normal rate and regular rhythm.   Pulmonary:      Effort: Pulmonary effort is normal.      Breath sounds: Normal breath sounds.   Abdominal:      General: Abdomen is flat. Bowel sounds are normal.      Palpations: Abdomen is soft.   Musculoskeletal:         General: Swelling, tenderness and signs of injury present.      Right lower leg: Edema present.   Neurological:      General: No focal deficit present.      Mental Status: He is oriented to person, place, and time.         Meds:    Current Facility-Administered Medications:   •  [DISCONTINUED] insulin regular **AND** POC Blood Glucose **AND** NOTIFY MD and PharmD **AND** dextrose 50%  •  insulin lispro  •  metoprolol SR  •  [START ON 2/10/2021] warfarin  •  senna-docusate **AND** polyethylene glycol/lytes **AND** magnesium hydroxide **AND** bisacodyl  •  ipratropium  •  furosemide  •  acetaminophen  •  zinc sulfate  •  multivitamin  •  penicillin g  •  MD Alert...Warfarin per Pharmacy  •   [START ON 2/20/2021] amiodarone  •  amiodarone  •  acetaminophen  •  oxyCODONE immediate-release  •  morphine injection  •  Respiratory Therapy Consult  •  labetalol  •  ondansetron  •  ondansetron    Labs:  Recent Labs     02/07/21 0208 02/08/21 0250 02/09/21 0218   WBC 15.8* 12.4* 13.3*   RBC 3.41* 3.03* 2.95*   HEMOGLOBIN 10.8* 9.8* 9.5*   HEMATOCRIT 32.8* 29.8* 28.7*   MCV 96.2 98.3* 97.3   MCH 31.7 32.3 32.2   RDW 62.9* 64.0* 62.4*   PLATELETCT 124* 142* 166   MPV 11.0 10.8 10.7   NEUTSPOLYS 87.30* 83.10* 87.80*   LYMPHOCYTES 5.70* 6.40* 5.20*   MONOCYTES 4.60 4.80 3.50   EOSINOPHILS 0.30 1.10 0.90   BASOPHILS 0.30 0.50 0.00   RBCMORPHOLO  --   --  Present     Recent Labs     02/07/21 0208 02/08/21  0250 02/09/21 0218 02/09/21  1018   SODIUM 139 134* 136  --    POTASSIUM 3.6 3.9 4.4  --    CHLORIDE 108 105 103  --    CO2 21 22 24  --    GLUCOSE 141* 126* 151*  --    BUN 30* 25* 19  --    CPKTOTAL  --   --   --  1000*     Recent Labs     02/07/21 0208 02/08/21 0250 02/09/21 0218   ALBUMIN  --  2.6* 2.7*   TBILIRUBIN  --  0.7 0.8   ALKPHOSPHAT  --  52 66   TOTPROTEIN  --  5.5* 5.7*   ALTSGPT  --  23 21   ASTSGOT  --  30 36   CREATININE 1.21 1.14 0.97       Imaging:  Dx-chest-limited (1 View)    Result Date: 2/5/2021 2/5/2021 5:29 PM HISTORY/REASON FOR EXAM:  CL placement TECHNIQUE/EXAM DESCRIPTION AND NUMBER OF VIEWS: Single AP view of the chest. COMPARISON: 2/5/2021 FINDINGS: Right central line projects over the SVC. Enteric tube passes below the level of the diaphragm. Endotracheal tube projects at the level of the aortic arch. There is right-sided pacer. Sternotomy wires are seen. The cardiomediastinal silhouette is stable.  There is blunting of the right costophrenic angle. There is hazy right basilar opacity which is unchanged. No pneumothorax is identified.     Right central line projects over the SVC. No pneumothorax. Small right pleural effusion with hazy right basilar opacity which may represent  atelectasis or pneumonitis. Stable prominence of the cardiomediastinal silhouette.     Dx-chest-portable (1 View)    Result Date: 2/8/2021 2/7/2021 11:30 PM HISTORY/REASON FOR EXAM:  For indication of respiratory failure; For indication of respiratory failure TECHNIQUE/EXAM DESCRIPTION AND NUMBER OF VIEWS: Single portable view of the chest. COMPARISON: Study from earlier the same date at 6:24 AM FINDINGS: Hardware is stably positioned in its visualized extent. HEART: Stable size. LUNGS: Lung volumes are low. There are perihilar opacities. There are bibasilar opacities. PLEURA: Neither costophrenic sulcus is well seen     1.  Increased BILATERAL atelectasis which could obscure an additional process such as pulmonary edema or pneumonia 2.  Possible BILATERAL pleural effusions    Dx-chest-portable (1 View)    Result Date: 2/7/2021 2/7/2021 6:12 AM HISTORY/REASON FOR EXAM:  For indication of respiratory failure; For indication of respiratory failure TECHNIQUE/EXAM DESCRIPTION AND NUMBER OF VIEWS: Single portable view of the chest. COMPARISON: Yesterday FINDINGS: The cardiomediastinal silhouettes are stable. There are ongoing bibasilar opacities, most likely atelectasis, worse on the right, as well as a small right pleural effusion. Endotracheal tube is no longer present. Right central venous line is unchanged in  position.     Stable bibasilar opacities, right greater than left.    Dx-chest-portable (1 View)    Result Date: 2/6/2021 2/6/2021 5:18 AM HISTORY/REASON FOR EXAM:  For indication of respiratory failure; For indication of respiratory failure TECHNIQUE/EXAM DESCRIPTION AND NUMBER OF VIEWS: Single portable view of the chest. COMPARISON: Yesterday FINDINGS: The cardiomediastinal silhouettes are stable. There is ongoing bibasilar atelectasis, mainly on the right, but with a small right pleural effusion. Support devices are positionally unchanged.     Stable chest findings compared to 2/5.    Dx-chest-portable (1  View)    Result Date: 2/5/2021 2/5/2021 5:22 AM HISTORY/REASON FOR EXAM:  For indication of respiratory failure; For indication of respiratory failure TECHNIQUE/EXAM DESCRIPTION AND NUMBER OF VIEWS: Single portable view of the chest. COMPARISON: Yesterday FINDINGS: Cardiac size is unchanged. Interstitial opacities persist in the mid and lower lung fields bilaterally, along with right basilar atelectasis and probable small right pleural effusion. Endotracheal tube is positionally unchanged.     Ongoing findings consistent with mild pulmonary edema, with right basilar atelectasis and small right pleural effusion. No interval change.    Dx-chest-portable (1 View)    Result Date: 2/4/2021 2/4/2021 7:55 PM HISTORY/REASON FOR EXAM:  Endotracheal tube placement TECHNIQUE/EXAM DESCRIPTION AND NUMBER OF VIEWS: Single portable view of the chest. COMPARISON: Prior image today at 1643 hours FINDINGS: An endotracheal tube is present, the tip of which is 2 to 3 cm above the corina. Overall cardiopulmonary appearance is unchanged.     Endotracheal tube in place as noted above. Stable cardiopulmonary appearance.    Dx-chest-portable (1 View)    Result Date: 2/4/2021 2/4/2021 4:38 PM HISTORY/REASON FOR EXAM:  possible sepsis.. Shortness of breath TECHNIQUE/EXAM DESCRIPTION AND NUMBER OF VIEWS: Single portable view of the chest. COMPARISON: December 17, 2020 FINDINGS: Patient is status post cardiac surgery. Single-lead right-sided pacemaker present. External pacer pads are present. There is right pleural fluid. There is vascular congestion. No pneumothorax.     Enlarged cardiac silhouette status post cardiac surgery with small right-sided pleural effusion and vascular congestion.    Us-extremity Venous Lower Bilat    Result Date: 2/5/2021   Vascular Laboratory  CONCLUSIONS  No superficial or deep venous thrombosis.  AILYN MCINTOSH  Exam Date:     02/05/2021 09:32  Room #:     Inpatient  Priority:     Stat  Ht (in):             Wt  (lb):  Ordering Physician:        JO ANN GARCIA  Referring Physician:       [id number]  Sonographer:               Gladys Clark RVT  Study Type:                Complete Bilateral  Technical Quality:         Adequate  Age:    65    Gender:     M  MRN:    [id number]  :    1955      BSA:  Indications:     Edema, unspecified, Localized swelling, mass and lump,                   unspecified lower limb  CPT Codes:       81490  ICD Codes:       R60.9  R22.40  History:         Bilateral lower extremity swelling/edema. Prior study done                   12/15/20.  Limitations:  PROCEDURES:  Bilateral lower extremity venous duplex imaging.  The following venous structures were evaluated: common femoral, profunda  femoral, greater saphenous, femoral, popliteal , peroneal and posterior  tibial veins.  Serial compression, augmentation maneuvers,  color and spectral Doppler  flow evaluations were performed.  FINDINGS:  Bilateral lower extremities -  No superficial or deep venous thrombosis.  Complete color filling and compressibility with normal venous flow dynamics  including spontaneous flow, response to augmentation maneuvers, and  respiratory phasicity.  The peroneal and posterior tibial veins are difficult to assess for  compressibility, but flow response to augmentation is demonstrated.  Alo Mohamud MD  (Electronically Signed)  Final Date:      2021                   09:54    Ec-echocardiogram Complete W/o Cont    Result Date: 2021  Transthoracic Echo Report Echocardiography Laboratory CONCLUSIONS Mildly depressed left ventricular function.  Left ventricular ejection fraction is visually estimated to be 45%, likely affected by rapid heart rate. Endocardial borders not well visualized to accurate assess wall motion abnormalities. Recommend repeating limited study with contrast. The right ventricle was grossly normal in size and function. Severely dilated left atrium. Known mitral  valve bioprosthesis which is functioning normally, transvalvular gradient is 6.5 mmHg @. Moderate tricuspid regurgitation. Pleural effusion noted. Compared to the study done on 2020, LVEF is measured lower, likely affected by rapid heart rate. Otherwise, no significant changes. AILYN MCINTOSH Exam Date:         2021                    20:52 Exam Location:     Inpatient Priority:          Routine Ordering Physician:        FIORDALIZA FERGUSON                             ([id number]) Referring Physician:       MARTY Theodore Sonographer:               Jasbir Alston [facility] Age:    65     Gender:    M MRN:    [id number] :    1955 BSA:    2.29   Ht (in):    70     Wt (lb):    250 Exam Type:     Complete Indications:     Persistent atrial fibrillation ICD Codes:       I48.1 CPT Codes:       54495 BP:   94     /   66     HR: Technical Quality:       Poor MEASUREMENTS  (Male / Female) Normal Values 2D ECHO LV Diastolic Diameter PLAX        4.6 cm                4.2 - 5.9 / 3.9 - 5.3 cm LV Systolic Diameter PLAX         2.9 cm                2.1 - 4.0 cm IVS Diastolic Thickness           1.2 cm                LVPW Diastolic Thickness          1.3 cm                LVOT Diameter                     2.2 cm                LV Ejection Fraction MOD BP       54.8 %                >= 55  % LV Ejection Fraction MOD 4C       48.2 %                LV Ejection Fraction MOD 2C       54.3 %                IVC Diameter                      2.1 cm                M-MODE Aortic Root Diameter MM           4.3 cm                DOPPLER AV Peak Velocity                  1.1 m/s               AV Peak Gradient                  5.1 mmHg              AV Mean Gradient                  3.1 mmHg              LVOT Peak Velocity                0.85 m/s              AV Area Cont Eq vti               2.7 cm2               MV Velocity Time Integral         35 cm                 TR Peak Velocity                  249 cm/s              PV Peak Velocity                   0.99 m/s              PV Peak Gradient                  3.9 mmHg              RVOT Peak Velocity                0.82 m/s              * Indicates values subject to auto-interpretation LV EF:        % FINDINGS Left Ventricle Normal left ventricular size.  Mild concentric left ventricular hypertrophy.  Mildly depressed left ventricular function.  Left ventricular ejection fraction is visually estimated to be 45%. Endocardial borders not well visualized to accurate assess wall motion abnormalities.  Diastolic function indeterminate due to mitral valve replacement and atrial fibrillation. Right Ventricle The right ventricle was grossly normal in size and function. Right Atrium Enlarged right atrium. Left Atrium Severely dilated left atrium. Left atrial volume index is 80  mL/sq m. Mitral Valve Known mitral valve bioprosthesis which is functioning normally, transvalvular gradient is 6.5 mmHg @120. Aortic Valve Structurally normal aortic valve without significant stenosis or regurgitation. Tricuspid Valve Structurally normal tricuspid valve. No tricuspid stenosis. Moderate tricuspid regurgitation. Estimated right ventricular systolic pressure  is 35 mmHg. Pulmonic Valve Structurally normal pulmonic valve without significant stenosis or regurgitation. Pericardium No pericardial effusion seen. Pleural effusion noted. Aorta The aortic root is normal. Yogesh Galindo MD (Electronically Signed) Final Date:     04 February 2021                 22:48    Ec-lopez W/o Cont    Result Date: 2/6/2021  Transesophageal Echo Report Echocardiography Laboratory CONCLUSIONS No evidence of endocarditis. Mildly reduced left ventricular systolic function. Left ventricular ejection fraction is visually estimated to be 45%. Normal right ventricular systolic function. The left atrium is surgically absent. Known mitral valve bioprosthesis which is functioning normally with appropriate transvalvular gradient. Structurally normal  tricuspid valve without significant stenosis Moderate tricuspid regurgitation. Estimated right ventricular systolic pressure  is 35 mmHg. AILYN MCINTOSH Exam Date:          2021                     11:02 Exam Location:      Inpatient Priority:            Routine Ordering Physician:        JACK CEDENO  ([id number]) Referring Physician:       [id number] Moon Sonographer:               Yonis Wilder RDCS Age:    65     Gender:    M MRN:    [id number] :    1955 BSA:    2.31   Ht (in):    70     Wt (lb):    254 Report Type:      Complete Indications:     Endocarditis and heart valve disorders in diseases                  classified elsewhere ICD Codes:       I39 CPT Codes:       50487 BP:   107    /   77     HR: Technical Quality:       Good MEASUREMENTS  (Male / Female) Normal Values 2D ECHO Estimated LV Ejection Fraction    45 %                  * Indicates values subject to auto-interpretation LV EF:  45    % Medications The patient was sedated on a ventilator. An additional 2 mg IV versed was given, I personally supervised moderate sedation with the bedside ICU nurse from 11:05 to 11:16 AM Limitations none Complications none Proc. Components The probe was inserted and manipulated by Dr. Cedeno. Probe # [id number]  was used for this procedure. FINDINGS Left Ventricle Mildly reduced left ventricular systolic function. Left ventricular ejection fraction is visually estimated to be 45%. Right Ventricle Moderately dilated right ventricle. Normal right ventricular systolic function.  Pacer wire seen in right ventricle. Right Atrium The right atrium is normal in size.  Pacemaker wire present in the right atrial cavity. Left Atrium The left atrium is surgically absent. LA Appendage Normal left atrial appendage. IA Septum The interatrial septum is normal. IV Septum The interventricular septum is normal. Mitral Valve Known mitral valve bioprosthesis which is  "functioning normally with appropriate transvalvular gradient. No valvular vegetations. Aortic Valve Structurally normal aortic valve without significant stenosis or regurgitation.  No valvular vegetations. Tricuspid Valve Structurally normal tricuspid valve without significant stenosis Moderate tricuspid regurgitation. Estimated right ventricular systolic pressure  is 35 mmHg.   No valvular vegetations. Pulmonic Valve Structurally normal pulmonic valve without significant stenosis or regurgitation. Pericardium Normal pericardium without effusion. Aorta The aortic root is normal. Josafat Cedeno MD (Electronically Signed) Final Date:     06 February 2021                 13:06      Micro:  Results     Procedure Component Value Units Date/Time    URINE CULTURE(NEW) [[id number]] Collected: 02/04/21 2148    Order Status: Completed Specimen: Urine Updated: 02/07/21 0826     Significant Indicator NEG     Source UR     Site -     Culture Result No growth at 48 hours.    Narrative:      Indication for culture:->Evaluation for sepsis without a  clear source of infection  Have you been in close contact with a person who is suspected  or known to be positive for COVID-19 within the last 30 days  (e.g. last seen that person < 30 days ago)->No  Indication for culture:->Evaluation for sepsis without a    BLOOD CULTURE [[id number]]  (Abnormal)  (Susceptibility) Collected: 02/04/21 1630    Order Status: Completed Specimen: Blood from Peripheral Updated: 02/07/21 0719     Significant Indicator POS     Source BLD     Site PERIPHERAL     Culture Result Growth detected by Bactec instrument. 02/05/2021  05:51      Streptococcus pyogenes (Group A)  This isolate does NOT demonstrate inducible Clindamycin  resistance in vitro.      Narrative:      CALL  Swain  19 tel. [phone],  CALLED  19 tel. [phone] 02/06/2021, 10:44, RB PERF. RESULTS CALLED TO:  [id number] RN  Per Hospital Policy: Only change Specimen Src: to \"Line\" if  specified by " "physician order.  No site indicated    Susceptibility     Streptococcus pyogenes (group a) (1)     Antibiotic Interpretation Microscan Method Status    Penicillin Sensitive 0.032 mcg/mL E-TEST Final    Cefotaxime Sensitive 0.023 mcg/mL E-TEST Final                   BLOOD CULTURE [[id number]] Collected: 02/06/21 2043    Order Status: Completed Specimen: Blood from Peripheral Updated: 02/07/21 0558     Significant Indicator NEG     Source BLD     Site PERIPHERAL     Culture Result No Growth  Note: Blood cultures are incubated for 5 days and  are monitored continuously.Positive blood cultures  are called to the RN and reported as soon as  they are identified.      Narrative:      Collected By:[id number] PRINCE CAS SKY  Per Hospital Policy: Only change Specimen Src: to \"Line\" if  specified by physician order.  Left Forearm/Arm    BLOOD CULTURE [[id number]] Collected: 02/06/21 2057    Order Status: Completed Specimen: Blood from Peripheral Updated: 02/07/21 0558     Significant Indicator NEG     Source BLD     Site PERIPHERAL     Culture Result No Growth  Note: Blood cultures are incubated for 5 days and  are monitored continuously.Positive blood cultures  are called to the RN and reported as soon as  they are identified.      Narrative:      Collected By:[id number] PRINCE CAS SKY  Per Hospital Policy: Only change Specimen Src: to \"Line\" if  specified by physician order.  Right Forearm/Arm    E-Test [[id number]] Collected: 02/04/21 1630    Order Status: Completed Specimen: Other Updated: 02/06/21 1045     ETEST Sensitivity INTERIM    Narrative:      19 tel. [phone] 02/06/2021, 10:44, RB PERF. RESULTS CALLED TO:  [id number] RN  Per Hospital Policy: Only change Specimen Src: to \"Line\" if  specified by physician order.    MRSA By PCR (Amp) [[id number]] Collected: 02/05/21 0930    Order Status: Completed Specimen: Respirate from Nares Updated: 02/05/21 1758     Significant Indicator NEG     Source RESP     Site NARES     MRSA PCR " "Negative for MRSA by PCR.    Narrative:      Collected By:[id number] TERRY THOMAS  Collected By:[id number] TERRY THOMAS    Blood Culture [[id number]] Collected: 02/04/21 1757    Order Status: Completed Specimen: Blood from Peripheral Updated: 02/05/21 0632     Significant Indicator NEG     Source BLD     Site PERIPHERAL     Culture Result No Growth  Note: Blood cultures are incubated for 5 days and  are monitored continuously.Positive blood cultures  are called to the RN and reported as soon as  they are identified.      Narrative:      From different peripheral sites, if not done within the last  24 hours (Per Hospital Policy: Only change specimen source to  \"Line\" if specified by physician order)  No site indicated    Urinalysis [[id number]]  (Abnormal) Collected: 02/04/21 2148    Order Status: Completed Specimen: Urine, Fragoso Cath Updated: 02/04/21 2229     Color DK Yellow     Character Turbid     Specific Gravity 1.033     Ph 5.0     Glucose Negative mg/dL      Ketones Negative mg/dL      Protein 100 mg/dL      Bilirubin Moderate     Urobilinogen, Urine 0.2     Nitrite Negative     Leukocyte Esterase Trace     Occult Blood Large     Micro Urine Req Microscopic    Narrative:      If not done within the last 24 hours    Blood Culture [[id number]]     Order Status: Canceled Specimen: Blood from Peripheral     COV-2, FLU A/B, AND RSV BY PCR (2-4 HOURS [employer]): Collect NP swab in VTM [[id number]] Collected: 02/04/21 1710    Order Status: Completed Specimen: Respirate Updated: 02/04/21 1814     Influenza virus A RNA Negative     Influenza virus B, PCR Negative     RSV, PCR Negative     SARS-CoV-2 by PCR NotDetected     Comment: PATIENTS: Important information regarding your results and instructions can  be found at [email or web address]   \"After your  Covid-19 Test\"  [facility] providers: PLEASE REFER TO DE-ESCALATION AND RETESTING PROTOCOL  on [email or web address]  **The [employer] GeneXpert Xpress " SARS-CoV-2 Test has been made available for  use under the Emergency Use Authorization (EUA) only.          SARS-CoV-2 Source NP Swab    Narrative:      Indication for culture:->Evaluation for sepsis without a  clear source of infection  Have you been in close contact with a person who is suspected  or known to be positive for COVID-19 within the last 30 days  (e.g. last seen that person < 30 days ago)->No    BLOOD CULTURE [[id number]]     Order Status: Canceled Specimen: Blood from Peripheral     URINALYSIS [[id number]] Collected: 02/04/21 0000    Order Status: Canceled Specimen: Urine           Assessment:  Active Hospital Problems    Diagnosis   • *Fluid overload secondary to IV fluids  [E87.70]   • Cellulitis of right lower extremity [L03.115]   • Atrial fibrillation with RVR ([facility]) [I48.91]   • Sepsis ([facility]) [A41.9]   • COPD (chronic obstructive pulmonary disease) ([facility]) [J44.9]   • Type 2 diabetes mellitus without complication, without long-term current use of insulin ([facility]) [E11.9]   • Lactic acid acidosis [E87.2]   • Acute respiratory failure with hypoxia ([facility]) [J96.01]   • Paroxysmal SVT (supraventricular tachycardia) ([facility]) [I47.1]     Interval 24 hours:      AF, O2 2 L NC   Labs reviewed  Micro reviewed    Pt with ongoing edema and drainage of right leg. Abx as below.       IMPRESSION:   Septic shock, possible toxic shock syndrome, resolved       Group A streptococcus bacteremia.  Source right lower extremity cellulitis in setting of prior MVR and ICD  -KD on 2/6 with no vegetations noted on valves or pacemaker wires  Right lower extremity cellulitis  Acute kidney injury, improving  Leukocytosis, ongoing, overall improved   Known bioprosthetic mitral valve replacement  Type 2 diabetes mellitus     PLAN:   Morales Olivier is a 65 y.o. obese man with a history of a bioprosthetic mitral valve replacement in March 2017, type 2 diabetes mellitus, pacemaker placement in July 2014, coronary artery disease and COPD  admitted after being found down.  Patient admitted for septic shock secondary to severe right lower extremity cellulitis with group A streptococcal bacteremia.  Blood culture on 2/4 (1 out of 2 sets) + group A streptococcus.  Repeat blood cultures on 2/6 remain negative to date.  KD without any evidence of prosthetic valve or native valve endocarditis.     -- Continue IV penicillin 4 million units every 4 -monitor closely as the patient has multiple comorbidities and is recently recovered from septic shock with ongoing significant cellulitis in right leg.  If any worsening will likely broaden antibiotics.   -- Okay to place midline   --- Antticipate a 14-day course of antibiotics from 2/6.  Stop date 2/20/2021  --- Plan is to Dc to SNF      Will continue to follow.

## 2021-02-09 NOTE — DISCHARGE PLANNING
Anticipated Discharge Disposition: SNF    Action: Spoke to pt at bedside who confirmed all information on facesheet. Pt gave verbal choice for SNF, choice form faxed to Madyson PANDYA. Pt amenable to first available between [facility], and [facility].    Barriers to Discharge: SNF acceptance    Plan: f/u with medical team, pt, DPA for SNF acceptance

## 2021-02-09 NOTE — PROGRESS NOTES
Daily Progress Note:     Date of Service: 2/9/2021  Primary Team: [facility] Team   Attending: Dr. Shah  Senior Resident: Dr. Ojeda  Intern: Dr. Motley  Contact:  [phone]    Chief Complaint: Fall  Subjective:  Charline is a 65-year-old male, admitted on Feb 4th for 3/4 SIRS criteria, concern for severe sepsis secondary to RLE cellulitis.  Admitted for severe sepsis versus septic shock.  Patient intubated in the ER after failing BiPAP.  Despite history of sick sinus syndrome status post pacemaker, patient found to be in SVT which later progressed to A. fib with RVR, on anticoagulation with warfarin.  Cardiology started amiodarone. The patient was successfully extubated on 2/8.    Patient reports that he feels well today.  No bowel movement today.    Review of Systems:    Review of Systems   Respiratory: Positive for cough. Negative for hemoptysis and sputum production.    Gastrointestinal: Positive for nausea and vomiting.   Musculoskeletal:        Right leg pain not out of proportion   All other systems reviewed and are negative.    Objective Data:   Vitals:   Temp:  [36.3 °C (97.3 °F)-36.8 °C (98.2 °F)] 36.6 °C (97.8 °F)  Pulse:  [] 83  Resp:  [18-20] 20  BP: (109-121)/(69-84) 112/76  SpO2:  [91 %-97 %] 96 %   93-95 % on room air    Physical Exam:   Physical Exam  Constitutional:       General: He is not in acute distress.     Appearance: He is normal weight.   HENT:      Head: Normocephalic.      Nose: Nose normal.      Mouth/Throat:      Mouth: Mucous membranes are moist.   Eyes:      Extraocular Movements: Extraocular movements intact.      Pupils: Pupils are equal, round, and reactive to light.   Cardiovascular:      Rate and Rhythm: Normal rate and regular rhythm.      Pulses: Normal pulses.   Pulmonary:      Effort: Pulmonary effort is normal. No respiratory distress.      Breath sounds: Wheezing and rales (Minimal) present.      Comments: Coarse.  Diminished.  Abdominal:      General: Abdomen  is flat. Bowel sounds are normal. There is no distension.      Tenderness: There is no abdominal tenderness. There is no guarding.   Musculoskeletal:         General: Swelling and tenderness present. No signs of injury.      Right lower leg: Edema (black patches surrounded by erythema.  Tenderness not out of proportion.  Not expanding) present.      Left lower leg: Edema present.      Comments: Fluid overloaded on exam.  Pitting edema to bilateral hips.  Pitting edema to hips bilaterally   Skin:     General: Skin is warm and dry.      Capillary Refill: Capillary refill takes less than 2 seconds.      Coloration: Skin is not jaundiced.      Findings: No bruising.   Neurological:      General: No focal deficit present.      Mental Status: He is alert.      Cranial Nerves: No cranial nerve deficit.      Motor: No weakness.   Psychiatric:         Mood and Affect: Mood normal.         Behavior: Behavior normal.         Thought Content: Thought content normal.     Labs:  WBC 13.3  Hgb 9.5    Sodium 136  Potassium 4.4  Labs unremarkable    Micro:  Blood cultures positive on 2/4 with GAS  Blood cultures negative on 2/6    EKG:  None    Imaging:   None    Meds:  Current Outpatient Medications   Medication Instructions   • acetaminophen (TYLENOL) 1,000 mg, Oral, EVERY 6 HOURS PRN   • allopurinol (ZYLOPRIM) 100 mg, Oral, EVERY MORNING   • benzonatate (TESSALON) 100 mg, Oral, 3 TIMES DAILY PRN   • cetirizine (ZYRTEC) 10 mg, Oral, EVERY MORNING   • colchicine (COLCRYS) 0.6 mg, Oral, 2 TIMES DAILY   • furosemide (LASIX) 20 mg, Oral, DAILY   • hydrOXYzine HCl (ATARAX) 25 mg, Oral, 3 TIMES DAILY PRN   • lisinopril (PRINIVIL) 2.5 mg, Oral, EVERY EVENING   • metFORMIN (GLUCOPHAGE) 500 mg, Oral, 2 TIMES DAILY WITH MEALS   • metoprolol SR (TOPROL XL) 50 mg, Oral, EVERY MORNING   • tamsulosin (FLOMAX) 0.4 MG capsule TAKE 1 CAPSULE BY MOUTH EVERY DAY   • warfarin (COUMADIN) 2 mg, Oral, SEE ADMIN INSTRUCTIONS, Takes 2 mg every  Friday has 5 mg tablets to take all other days   • warfarin (COUMADIN) 5 mg, Oral, SEE ADMIN INSTRUCTIONS, Takes Monday, Tuesday, Wednesday, Thursday, Saturday, and Sunday, has 2 mg tablets to take on Fridays      Scheduled Medications   Medication Dose Frequency   • insulin lispro  0-10 Units TID AC   • metoprolol SR  50 mg Q DAY   • [START ON 2/10/2021] warfarin  1 mg DAILY AT 1800   • polyethylene glycol/lytes  1 Packet DAILY    And   • senna-docusate  2 Tab BID   • ipratropium  0.5 mg Q8HRS (RT)   • furosemide  40 mg BID DIURETIC   • acetaminophen  650 mg TID   • zinc sulfate  220 mg DAILY   • multivitamin  1 Tab DAILY   • penicillin g  4 Million Units Q4HRS   • MD Alert...Warfarin per Pharmacy   PHARMACY TO DOSE   • [START ON 2/20/2021] amiodarone  200 mg DAILY   • amiodarone  400 mg TWICE DAILY      Consultants/Specialty:  Wound  PT  OT  Cardiology  Infectious disease    Problem Representation:   Morales Olivier is a 65-year-old male, admitted on Feb 4th for 3/4 SIRS criteria, concern for severe sepsis secondary to RLE cellulitis.  Admitted for severe sepsis versus septic shock.  Patient intubated in the ER after failing BiPAP.  Despite history of sick sinus syndrome status post pacemaker, patient found to be in SVT which later progressed to A. fib with RVR, on anticoagulation with warfarin.  Cardiology started amiodarone. The patient was successfully extubated on 2/8.    * Fluid overload secondary to IV fluids   Assessment & Plan  Likely secondary to IV fluids given per sepsis protocol  Patient now presents with pitting edema to hips  Patient does not look septic  WBC now 13.3  No signs stable, without tachycardia or tachypnea or fever  Blood pressure stable  We will trial Lasix 40 IV twice daily  Continuous ins and outs  Daily weights    Cellulitis of right lower extremity- (present on admission)  Assessment & Plan  Blood cultures 2/4 grew GAS  On IV penicillin G  ID following.  Recommends 14 days of  antibiotics from 2/6- 2/20  Patient will need a midline on discharge    Atrial fibrillation with RVR ([facility])- (present on admission)  Assessment & Plan  On telemetry, ventricularly paced rhythm   Amiodarone therapy started by cardiology.  Anticoagulation with warfarin.  Patient has pacemaker for sick sinus syndrome  As patient is on amiodarone, which can interfere with warfarin  Continue metoprolol    Sepsis ([facility])- (present on admission)  Assessment & Plan  Resolving  Severe Sepsis Present on admission, with respiratory failure and EBEN  Patient met 3/4 SIRS criteria on admission  Admission WBC 28.8 -> now 13.3  Source of infection is left lower extremity cellulitis  Sepsis protocol initiated.  Patient received IV fluids and antibiotics  Patient no longer shows signs of endorgan damage  Echo and KD normal, no vegetation  Continue penicillin G for group A strep bacteremia    COPD (chronic obstructive pulmonary disease) ([facility])- (present on admission)  Assessment & Plan  Some wheezing noted  No crackles on PE  18-pack-year history  Patient now on ipratropium nebulizer    Type 2 diabetes mellitus without complication, without long-term current use of insulin ([facility])- (present on admission)  Assessment & Plan  Recent A1c 7.6  Glucose 140s to 150s  Patient is not on home insulin  SSI lispro  Likely will not need insulin therapy at home    Lactic acid acidosis- (present on admission)  Assessment & Plan  Resolved  Secondary to tissue ischemia in setting of severe sepsis.    Paroxysmal SVT (supraventricular tachycardia) ([facility])- (present on admission)  Assessment & Plan  Initially noted to be in supraventricular tachycardia changing to A. fib with RVR.    Acute respiratory failure with hypoxia ([facility])- (present on admission)  Assessment & Plan  Resolved  Likely combination of sepsis, A. fib RVR  Now saturating 93 to 95% on room air  Incentive spirometer    Code status: Full code  Diet: Cardiac diabetic  Lines/ tubes/ drains: Condom  cath  IV fluids: None  Abx (dates): Penicillin G  GI ppx: MiraLAX  DVT ppx: Warfarin  SNF referral placed

## 2021-02-09 NOTE — THERAPY
"Physical Therapy   Daily Treatment     Patient Name: Morales Olivier  Age:  65 y.o., Sex:  male  Medical Record #: [id number]  Today's Date: 2/8/2021     Precautions: Fall Risk    Assessment    Pt progressing as expected w/ therapy. Pt lethargic and requiring a lot of time to perform all mobility. Pt moving LE's in bed for there ex but had a hard time moving them towards the EOB for bed mobility. Pt very nauseous upon sitting up and stating overall not feeling well. Pt was able to sit for a couple minutes to let it pass but fatigued quickly thereafter. Pt also w/ increased draining in the R LE once in gravity dependent position so standing activities deferred.    Plan    Continue current treatment plan.    DC Equipment Recommendations: Unable to determine at this time  Discharge Recommendations: Recommend post-acute placement for additional physical therapy services prior to discharge home      Subjective    \"I was going to take a nap instead.\"     Objective       02/08/21 1527   Precautions   Precautions Fall Risk   Comments R LE weeping   Gait Analysis   Gait Level Of Assist Unable to Participate   Bed Mobility    Supine to Sit Moderate Assist   Sit to Supine Moderate Assist   Scooting Moderate Assist   Rolling   (sit pivot)   Comments Pt requiring a lot of time to perform w/ cues to continue task. Pt able to move LE's in bed but not while performing bed mobility.   Functional Mobility   Sit to Stand Unable to Participate   Bed, Chair, Wheelchair Transfer Unable to Participate   Mobility Pts R LE w/ increased draining in gravitiy dependent. Deferred STS.   Short Term Goals    Short Term Goal # 1 Pt will perform supine<>sit from flat HOB/no railing with supervision within 6 visits to ensure independnet mobility at home.    Goal Outcome # 1 goal not met   Short Term Goal # 2 Pt will perform sit<>stand with FWW and supervision within 6 visits to ensure independent moblity at home.    Goal Outcome # 2 Goal not met "   Short Term Goal # 3 Pt will ambulate x 150ft with FWW and supervision within 6 visits to ensure independent mobility at home.    Goal Outcome # 3 Goal not met

## 2021-02-09 NOTE — PROGRESS NOTES
Received report from night shift RNPratima. Assumed care of pt. Pt report no needs at this time. Updated pt on plan of care. Pt resting comfortably in bed. Fall and skin precautions in place. Educated on use of call light. Hourly rounding and continuous monitoring in place.

## 2021-02-09 NOTE — PROGRESS NOTES
Cardiology Follow Up Progress Note    Date of Service  2/9/2021    Attending Physician  Sebastian Daley M.D.    Chief Complaint   Found down     Cardiology consult   SVT    HPI  Morales Olivier is a 65 y.o. male admitted 2/4/2021 with found down on the ground for unknown amount of time. Found to have a Heart rate of 190 SVT, complaint of dyspnea, right leg pain, heart rate did not significantly improve with adenosine and diltiazem. Worsening respiratory status status post intubation 2/4/2020 at 7 PM. Recent diagnosis of left lower extremity cellulitis, was hospitalized on 12/15/2020-12/24/2020.  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 Parnell on 3/8/2017, hypertension, DVT, type 2 diabetes.    Interim Events  Patient resting in bed, denies any chest pain, sob, or palpitaitons. Complains of dizziness, non positional.     afib 90s  H/h 9.8/29.8    K 4.4  INR 4.47    Review of Systems  Review of Systems   Constitutional: Negative for chills, diaphoresis and fever.   HENT: Negative for nosebleeds and trouble swallowing.    Respiratory: Negative for cough, chest tightness and shortness of breath.    Cardiovascular: Negative for chest pain, palpitations and leg swelling.   Gastrointestinal: Negative for abdominal distention, abdominal pain and blood in stool.   Genitourinary: Negative for hematuria.   Musculoskeletal: Positive for back pain and joint swelling.   Skin: Positive for wound. Negative for color change.   Neurological: Positive for dizziness. Negative for syncope and numbness.   Psychiatric/Behavioral: Negative for agitation and confusion. The patient is not nervous/anxious.        Vital signs in last 24 hours  Temp:  [36.3  °C (97.3 °F)-36.8 °C (98.2 °F)] 36.4 °C (97.5 °F)  Pulse:  [] 84  Resp:  [18-20] 20  BP: (109-121)/(64-84) 113/69  SpO2:  [91 %-97 %] 95 %    Physical Exam  Physical Exam  Vitals signs and nursing note reviewed.   Constitutional:       Appearance: Normal appearance. He is ill-appearing.   HENT:      Head: Normocephalic and atraumatic.   Eyes:      Pupils: Pupils are equal, round, and reactive to light.   Neck:      Musculoskeletal: Normal range of motion.   Cardiovascular:      Rate and Rhythm: Tachycardia present. Rhythm irregular.      Heart sounds: Normal heart sounds. No murmur.   Pulmonary:      Effort: Pulmonary effort is normal.      Breath sounds: Normal breath sounds.   Abdominal:      General: Abdomen is flat.   Musculoskeletal:      Right lower leg: Edema present.      Left lower leg: Edema present.   Skin:     General: Skin is warm and dry.      Findings: Wound present.   Neurological:      General: No focal deficit present.      Mental Status: He is alert and oriented to person, place, and time.   Psychiatric:         Mood and Affect: Mood normal.         Behavior: Behavior normal.         Thought Content: Thought content normal.         Judgment: Judgment normal.         Lab Review  Lab Results   Component Value Date/Time    WBC 13.3 (H) 02/09/2021 02:18 AM    RBC 2.95 (L) 02/09/2021 02:18 AM    HEMOGLOBIN 9.5 (L) 02/09/2021 02:18 AM    HEMATOCRIT 28.7 (L) 02/09/2021 02:18 AM    MCV 97.3 02/09/2021 02:18 AM    MCH 32.2 02/09/2021 02:18 AM    MCHC 33.1 (L) 02/09/2021 02:18 AM    MPV 10.7 02/09/2021 02:18 AM      Lab Results   Component Value Date/Time    SODIUM 136 02/09/2021 02:18 AM    POTASSIUM 4.4 02/09/2021 02:18 AM    CHLORIDE 103 02/09/2021 02:18 AM    CO2 24 02/09/2021 02:18 AM    GLUCOSE 151 (H) 02/09/2021 02:18 AM    BUN 19 02/09/2021 02:18 AM    CREATININE 0.97 02/09/2021 02:18 AM    CREATININE 1.4 04/27/2009 10:08 AM      Lab Results   Component Value Date/Time    ASTSGOT 36 02/09/2021  02:18 AM    ALTSGPT 21 02/09/2021 02:18 AM     Lab Results   Component Value Date/Time    CHOLSTRLTOT 126 11/20/2020 05:49 PM    LDL 61 11/20/2020 05:49 PM    HDL 22 (A) 11/20/2020 05:49 PM    TRIGLYCERIDE 217 (H) 11/20/2020 05:49 PM    TROPONINT 20 (H) 02/04/2021 04:15 PM       No results for input(s): NTPROBNP in the last 72 hours.    Cardiac Imaging and Procedures Review  Echocardiogram:    2/6/2021  No evidence of endocarditis.  Mildly reduced left ventricular systolic function.  Left ventricular ejection fraction is visually estimated to be 45%.  Normal right ventricular systolic function.  The left atrium is surgically absent.  Known mitral valve bioprosthesis which is functioning normally with   appropriate transvalvular gradient.  Structurally normal tricuspid valve without significant stenosis   Moderate tricuspid regurgitation.  Estimated right ventricular systolic pressure  is 35 mmHg.      Venous duplex  2/5/2021  No superficial or deep venous thrombosis.    MPI   11/21/2020  There is mild global hypokinesis.   No evidence of infarct or reversible ischemia.   ECG INTERPRETATION   Nondiagnostic ECG portion of a Regadenoson nuclear stress test.     Assessment/Plan  No new Assessment & Plan notes have been filed under this hospital service since the last note was generated.  Service: Cardiology    1. A. fib RVR in the setting of sepsis/bacteremia/history of A. fib  - rate 90-110s metoprolol increased to 50mg XL    - Continue amiodarone 400 mg p.o. twice daily x2 weeks, then 400 mg p.o. daily x2 weeks, then 200 mg daily.  - continue warfarin per pharmacy, INR today is 4.47   - ECHO showed mildly depressed EF, 45% , likely secondary to rapid heart rate, as well as substance      2. Acute respiratory failure with hypoxia  VDRF  -improved      3. Sepsis, cultures positive for group A strep bacteremia  - Recent history of left lower leg cellulitis 12/2020  - Now with right lower extremity erythema consistent  with cellulitis  - Currently on Zosyn     4. Known history of mitral valve replacement 2008 with redo in 2017  - Known mitral valve bioprosthesis which is functioning normally   - KD showed no valvular vegetations noted.      5. [employer] pacemaker in situ, 2014  - Appears to be functioning well    Future Appointments   Date Time Provider Department Center   2/17/2021  1:40 PM Sumanth Yancey M.D. [facility] None     Thank you for allowing me to participate in the care of this patient.  I will continue to follow this patient    Please contact me with any questions.    VINCENZO Sharma   [facility]

## 2021-02-09 NOTE — CARE PLAN
Problem: Safety  Goal: Will remain free from injury  Outcome: PROGRESSING AS EXPECTED    Pt remains free from falls     Problem: Infection  Goal: Will remain free from infection  Outcome: PROGRESSING AS EXPECTED   Lab values improving

## 2021-02-09 NOTE — DISCHARGE PLANNING
Received Choice form at [phone]  Agency/Facility Name: [facility].  Referral sent per Choice form @ [phone]

## 2021-02-10 PROBLEM — L03.115 CELLULITIS OF RIGHT LOWER EXTREMITY: Chronic | Status: ACTIVE | Noted: 2021-01-01

## 2021-02-10 PROBLEM — D72.825 BANDEMIA: Status: ACTIVE | Noted: 2021-01-01

## 2021-02-10 NOTE — PROGRESS NOTES
Inpatient Anticoagulation Service Note    Date: 2/10/2021    Reason for Anticoagulation: Atrial Fibrillation, Deep Vein Thrombosis, Mechanical Mitral Valve Replacement  , Aortic Mechanical Valve Replacement     Target INR: 2.5 to 3.5         Hemoglobin Value: (!) 9.6  Hematocrit Value: (!) 29.7  Lab Platelet Value: 230    INR from last 7 days     Date/Time INR Value    02/10/21 0242  (!) 5.7    02/09/21 0218  (!) 4.47    02/08/21 0250  (!) 3.04    02/07/21 0827  (!) 2.74    02/04/21 1615  (!) 1.64        Dose from last 7 days     Date/Time Dose (mg)    02/09/21 1347  0    02/08/21 1404  2    02/07/21 1606  2.5        Average Dose (mg): (Home Dose: 2 mg Th + 4 mg We + 5 mg ROW per chart review)  Significant Interactions: Amiodarone, Antibiotics, Flagyl  Bridge Therapy: No  Days of Overlap Therapy: 1   INR Value Greater than 2 Prior to Discontinuation of Parenteral Anticoagulation: Yes     Reversal Agent Administered: Not Applicable    Comments: INR still increasing, now at 5.07. No overt s/s of bleeding noted, H/H low but stable. Pt  was previously on his home warfarin dosing. Warfarin was held yesterday and we will continue to hold warfarin. Will check the INR tomorrow. DDI's with warfarin noted.    Education Material Provided?: No(chronic warfarin patient)  Pharmacist suggested discharge dosing: TBD, will need a lower dose of warfarin than his home warfarin dosing.     Wilfredo Hathaway, PharmD

## 2021-02-10 NOTE — PROGRESS NOTES
Cardiology Follow Up Progress Note    Date of Service  2/10/2021    Attending Physician  Sebastian Daley M.D.    Chief Complaint   Found down     Cardiology consult   SVT    HPI  Morales Olivier is a 65 y.o. male admitted 2/4/2021 with found down on the ground for unknown amount of time. Found to have a Heart rate of 190 SVT, complaint of dyspnea, right leg pain, heart rate did not significantly improve with adenosine and diltiazem. Worsening respiratory status status post intubation 2/4/2020 at 7 PM. Recent diagnosis of left lower extremity cellulitis, was hospitalized on 12/15/2020-12/24/2020.  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 Parnell on 3/8/2017, hypertension, DVT, type 2 diabetes.    Interim Events  Patient resting in bed, denies any chest pain, sob, or palpitaitons. Paced 80s, and NSR underlying.    H/h 9.8/29.8    K 4.4  INR 5.7, holding warfarin     Review of Systems  Review of Systems   Constitutional: Negative for chills, diaphoresis and fever.   HENT: Negative for nosebleeds and trouble swallowing.    Respiratory: Negative for cough, chest tightness and shortness of breath.    Cardiovascular: Negative for chest pain, palpitations and leg swelling.   Gastrointestinal: Negative for abdominal distention, abdominal pain and blood in stool.   Genitourinary: Negative for hematuria.   Musculoskeletal: Positive for back pain and joint swelling.   Skin: Positive for wound. Negative for color change.   Neurological: Positive for dizziness. Negative for syncope and numbness.   Psychiatric/Behavioral: Negative for agitation and confusion. The patient is not nervous/anxious.        Vital signs in last 24 hours  Temp:  [36.2  °C (97.2 °F)-37.1 °C (98.8 °F)] 36.2 °C (97.2 °F)  Pulse:  [75-84] 80  Resp:  [14-20] 15  BP: (103-127)/(65-76) 103/65  SpO2:  [92 %-97 %] 95 %    Physical Exam  Physical Exam  Vitals and nursing note reviewed.   Constitutional:       Appearance: Normal appearance. He is ill-appearing.   HENT:      Head: Normocephalic and atraumatic.   Eyes:      Pupils: Pupils are equal, round, and reactive to light.   Cardiovascular:      Rate and Rhythm: Tachycardia present. Rhythm irregular.      Heart sounds: Normal heart sounds. No murmur.   Pulmonary:      Effort: Pulmonary effort is normal.      Breath sounds: Normal breath sounds.   Abdominal:      General: Abdomen is flat.   Musculoskeletal:      Cervical back: Normal range of motion.      Right lower leg: Edema present.      Left lower leg: Edema present.   Skin:     General: Skin is warm and dry.      Findings: Wound present.   Neurological:      General: No focal deficit present.      Mental Status: He is alert and oriented to person, place, and time.   Psychiatric:         Mood and Affect: Mood normal.         Behavior: Behavior normal.         Thought Content: Thought content normal.         Judgment: Judgment normal.         Lab Review  Lab Results   Component Value Date/Time    WBC 16.1 (H) 02/10/2021 02:42 AM    RBC 3.17 (L) 02/10/2021 02:42 AM    HEMOGLOBIN 10.1 (L) 02/10/2021 02:42 AM    HEMATOCRIT 31.1 (L) 02/10/2021 02:42 AM    MCV 98.1 (H) 02/10/2021 02:42 AM    MCH 31.9 02/10/2021 02:42 AM    MCHC 32.5 (L) 02/10/2021 02:42 AM    MPV 10.7 02/10/2021 02:42 AM      Lab Results   Component Value Date/Time    SODIUM 136 02/10/2021 02:42 AM    POTASSIUM 4.2 02/10/2021 02:42 AM    CHLORIDE 100 02/10/2021 02:42 AM    CO2 28 02/10/2021 02:42 AM    GLUCOSE 118 (H) 02/10/2021 02:42 AM    BUN 20 02/10/2021 02:42 AM    CREATININE 1.06 02/10/2021 02:42 AM    CREATININE 1.4 04/27/2009 10:08 AM      Lab Results   Component Value Date/Time    ASTSGOT 35 02/10/2021 02:42 AM     ALTSGPT 17 02/10/2021 02:42 AM     Lab Results   Component Value Date/Time    CHOLSTRLTOT 126 11/20/2020 05:49 PM    LDL 61 11/20/2020 05:49 PM    HDL 22 (A) 11/20/2020 05:49 PM    TRIGLYCERIDE 217 (H) 11/20/2020 05:49 PM    TROPONINT 20 (H) 02/04/2021 04:15 PM       No results for input(s): NTPROBNP in the last 72 hours.    Cardiac Imaging and Procedures Review  Echocardiogram:    2/6/2021  No evidence of endocarditis.  Mildly reduced left ventricular systolic function.  Left ventricular ejection fraction is visually estimated to be 45%.  Normal right ventricular systolic function.  The left atrium is surgically absent.  Known mitral valve bioprosthesis which is functioning normally with   appropriate transvalvular gradient.  Structurally normal tricuspid valve without significant stenosis   Moderate tricuspid regurgitation.  Estimated right ventricular systolic pressure  is 35 mmHg.      Venous duplex  2/5/2021  No superficial or deep venous thrombosis.    MPI   11/21/2020  There is mild global hypokinesis.   No evidence of infarct or reversible ischemia.   ECG INTERPRETATION   Nondiagnostic ECG portion of a Regadenoson nuclear stress test.     Assessment/Plan  No new Assessment & Plan notes have been filed under this hospital service since the last note was generated.  Service: Cardiology    1. A. fib RVR in the setting of sepsis/bacteremia/history of A. fib  - paced rhyhtm with underlying NSR  - continue metoprolol  50mg XL    - Continue amiodarone 400 mg p.o. twice daily x2 weeks, then 400 mg p.o. daily x2 weeks, then 200 mg daily.  - continue warfarin per pharmacy, INR today is 5, holding warfarin    - ECHO showed mildly depressed EF, 45% , likely secondary to rapid heart rate, as well as substance      2. Acute respiratory failure with hypoxia  VDRF  -improved      3. Sepsis, cultures positive for group A strep bacteremia  - Recent history of left lower leg cellulitis 12/2020  - Now with right lower extremity  erythema consistent with cellulitis  - Currently on Zosyn     4. Known history of mitral valve replacement 2008 with redo in 2017  - Known mitral valve bioprosthesis which is functioning normally   - KD showed no valvular vegetations noted.      5. [employer] pacemaker in situ, 2014  - Appears to be functioning well    6. Acute decompensated heart failure with ef 45%:  - continue furosemide 40mg BID iV   - strict I and O, daily stand up weight   - noted net +7K     Future Appointments   Date Time Provider Department Center   2/17/2021  1:40 PM Sumanth Yancey M.D. [facility] None     Thank you for allowing me to participate in the care of this patient.  I will continue to follow this patient    Please contact me with any questions.    VINCENZO Sharma   [facility]

## 2021-02-10 NOTE — PROGRESS NOTES
Infectious Disease Progress Note    Author: Luciana Ingram M.D. Date & Time of service: 2/10/2021  11:53 AM    Chief Complaint:  Septic shock, RLE cellulitits. GAS bacteremia     Interval History:   AF, O2 RA 2 L NC, pressors remain off. States the leg still is painful but improving overall.  He is continued on IV penicillin as below.   AF, O2 2 L NC, ongoing edema and drainage of right leg    Review of Systems:  Review of Systems   Constitutional: Positive for malaise/fatigue. Negative for chills and fever.   Respiratory: Negative for cough and shortness of breath.    Gastrointestinal: Positive for diarrhea and nausea. Negative for abdominal pain, constipation and vomiting.   Musculoskeletal: Positive for myalgias.       Hemodynamics:  Temp (24hrs), Av.6 °C (97.8 °F), Min:36.2 °C (97.2 °F), Max:37.1 °C (98.8 °F)  Temperature: 36.2 °C (97.2 °F)  Pulse  Av.4  Min: 75  Max: 193   Blood Pressure : 103/65       Physical Exam:  Physical Exam  Constitutional:       Appearance: Normal appearance.   Cardiovascular:      Rate and Rhythm: Normal rate and regular rhythm.   Pulmonary:      Effort: Pulmonary effort is normal.      Breath sounds: Normal breath sounds.   Abdominal:      General: Abdomen is flat. There is distension.      Palpations: Abdomen is soft.      Tenderness: There is abdominal tenderness. There is no guarding.   Musculoskeletal:         General: Swelling, tenderness and deformity present.      Right lower leg: Edema present.      Comments: Right leg with edema, erythema and warmth, areas of necrotic appearing skin and some blistering on ankle, mild tenderness to palpation.  Continuing to have significant yellow drainage.  Left lower leg with lesion and some ongoing drainage but otherwise no sign of infection.   Skin:     General: Skin is warm and dry.   Neurological:      General: No focal deficit present.      Mental Status: He is alert and oriented to person, place, and time.      Psychiatric:         Mood and Affect: Mood normal.         Behavior: Behavior normal.         Meds:    Current Facility-Administered Medications:   •  phosphorus  •  [DISCONTINUED] insulin regular **AND** POC Blood Glucose **AND** NOTIFY MD and PharmD **AND** dextrose 50%  •  insulin lispro  •  metoprolol SR  •  senna-docusate **AND** polyethylene glycol/lytes **AND** magnesium hydroxide **AND** bisacodyl  •  ipratropium  •  furosemide  •  acetaminophen  •  zinc sulfate  •  multivitamin  •  penicillin g  •  [Held by provider] MD Alert...Warfarin per Pharmacy  •  [START ON 2/20/2021] amiodarone  •  amiodarone  •  acetaminophen  •  oxyCODONE immediate-release  •  morphine injection  •  Respiratory Therapy Consult  •  labetalol  •  ondansetron  •  ondansetron    Labs:  Recent Labs     02/08/21  0250 02/09/21  0218 02/10/21  0242   WBC 12.4* 13.3* 16.1*   RBC 3.03* 2.95* 3.17*   HEMOGLOBIN 9.8* 9.5* 10.1*   HEMATOCRIT 29.8* 28.7* 31.1*   MCV 98.3* 97.3 98.1*   MCH 32.3 32.2 31.9   RDW 64.0* 62.4* 63.4*   PLATELETCT 142* 166 240   MPV 10.8 10.7 10.7   NEUTSPOLYS 83.10* 87.80* 73.90*   LYMPHOCYTES 6.40* 5.20* 4.30*   MONOCYTES 4.80 3.50 4.30   EOSINOPHILS 1.10 0.90 0.90   BASOPHILS 0.50 0.00 0.90   RBCMORPHOLO  --  Present Present     Recent Labs     02/08/21  0250 02/09/21 0218 02/09/21  1018 02/10/21  0242   SODIUM 134* 136  --  136   POTASSIUM 3.9 4.4  --  4.2   CHLORIDE 105 103  --  100   CO2 22 24  --  28   GLUCOSE 126* 151*  --  118*   BUN 25* 19  --  20   CPKTOTAL  --   --  1000* 828*     Recent Labs     02/08/21  0250 02/09/21  0218 02/10/21  0242   ALBUMIN 2.6* 2.7* 2.8*   TBILIRUBIN 0.7 0.8 0.8   ALKPHOSPHAT 52 66 73   TOTPROTEIN 5.5* 5.7* 6.0   ALTSGPT 23 21 17   ASTSGOT 30 36 35   CREATININE 1.14 0.97 1.06       Imaging:  DX-CHEST-LIMITED (1 VIEW)    Result Date: 2/5/2021 2/5/2021 5:29 PM HISTORY/REASON FOR EXAM:  CL placement TECHNIQUE/EXAM DESCRIPTION AND NUMBER OF VIEWS: Single AP view of the chest.  COMPARISON: 2/5/2021 FINDINGS: Right central line projects over the SVC. Enteric tube passes below the level of the diaphragm. Endotracheal tube projects at the level of the aortic arch. There is right-sided pacer. Sternotomy wires are seen. The cardiomediastinal silhouette is stable.  There is blunting of the right costophrenic angle. There is hazy right basilar opacity which is unchanged. No pneumothorax is identified.     Right central line projects over the SVC. No pneumothorax. Small right pleural effusion with hazy right basilar opacity which may represent atelectasis or pneumonitis. Stable prominence of the cardiomediastinal silhouette.     DX-CHEST-PORTABLE (1 VIEW)    Result Date: 2/8/2021 2/7/2021 11:30 PM HISTORY/REASON FOR EXAM:  For indication of respiratory failure; For indication of respiratory failure TECHNIQUE/EXAM DESCRIPTION AND NUMBER OF VIEWS: Single portable view of the chest. COMPARISON: Study from earlier the same date at 6:24 AM FINDINGS: Hardware is stably positioned in its visualized extent. HEART: Stable size. LUNGS: Lung volumes are low. There are perihilar opacities. There are bibasilar opacities. PLEURA: Neither costophrenic sulcus is well seen     1.  Increased BILATERAL atelectasis which could obscure an additional process such as pulmonary edema or pneumonia 2.  Possible BILATERAL pleural effusions    DX-CHEST-PORTABLE (1 VIEW)    Result Date: 2/7/2021 2/7/2021 6:12 AM HISTORY/REASON FOR EXAM:  For indication of respiratory failure; For indication of respiratory failure TECHNIQUE/EXAM DESCRIPTION AND NUMBER OF VIEWS: Single portable view of the chest. COMPARISON: Yesterday FINDINGS: The cardiomediastinal silhouettes are stable. There are ongoing bibasilar opacities, most likely atelectasis, worse on the right, as well as a small right pleural effusion. Endotracheal tube is no longer present. Right central venous line is unchanged in  position.     Stable bibasilar opacities, right  greater than left.    DX-CHEST-PORTABLE (1 VIEW)    Result Date: 2/6/2021 2/6/2021 5:18 AM HISTORY/REASON FOR EXAM:  For indication of respiratory failure; For indication of respiratory failure TECHNIQUE/EXAM DESCRIPTION AND NUMBER OF VIEWS: Single portable view of the chest. COMPARISON: Yesterday FINDINGS: The cardiomediastinal silhouettes are stable. There is ongoing bibasilar atelectasis, mainly on the right, but with a small right pleural effusion. Support devices are positionally unchanged.     Stable chest findings compared to 2/5.    DX-CHEST-PORTABLE (1 VIEW)    Result Date: 2/5/2021 2/5/2021 5:22 AM HISTORY/REASON FOR EXAM:  For indication of respiratory failure; For indication of respiratory failure TECHNIQUE/EXAM DESCRIPTION AND NUMBER OF VIEWS: Single portable view of the chest. COMPARISON: Yesterday FINDINGS: Cardiac size is unchanged. Interstitial opacities persist in the mid and lower lung fields bilaterally, along with right basilar atelectasis and probable small right pleural effusion. Endotracheal tube is positionally unchanged.     Ongoing findings consistent with mild pulmonary edema, with right basilar atelectasis and small right pleural effusion. No interval change.    DX-CHEST-PORTABLE (1 VIEW)    Result Date: 2/4/2021 2/4/2021 7:55 PM HISTORY/REASON FOR EXAM:  Endotracheal tube placement TECHNIQUE/EXAM DESCRIPTION AND NUMBER OF VIEWS: Single portable view of the chest. COMPARISON: Prior image today at 1643 hours FINDINGS: An endotracheal tube is present, the tip of which is 2 to 3 cm above the corina. Overall cardiopulmonary appearance is unchanged.     Endotracheal tube in place as noted above. Stable cardiopulmonary appearance.    DX-CHEST-PORTABLE (1 VIEW)    Result Date: 2/4/2021 2/4/2021 4:38 PM HISTORY/REASON FOR EXAM:  possible sepsis.. Shortness of breath TECHNIQUE/EXAM DESCRIPTION AND NUMBER OF VIEWS: Single portable view of the chest. COMPARISON: December 17, 2020 FINDINGS:  Patient is status post cardiac surgery. Single-lead right-sided pacemaker present. External pacer pads are present. There is right pleural fluid. There is vascular congestion. No pneumothorax.     Enlarged cardiac silhouette status post cardiac surgery with small right-sided pleural effusion and vascular congestion.    US-EXTREMITY VENOUS LOWER BILAT    Result Date: 2021   Vascular Laboratory  CONCLUSIONS  No superficial or deep venous thrombosis.  AILYN MCINTOSH  Exam Date:     2021 09:32  Room #:     Inpatient  Priority:     Stat  Ht (in):             Wt (lb):  Ordering Physician:        JO ANN GARCIA  Referring Physician:       [id number] Diamond  Sonographer:               Gladys Clark RVT  Study Type:                Complete Bilateral  Technical Quality:         Adequate  Age:    65    Gender:     M  MRN:    [id number]  :    1955      BSA:  Indications:     Edema, unspecified, Localized swelling, mass and lump,                   unspecified lower limb  CPT Codes:       66123  ICD Codes:       R60.9  R22.40  History:         Bilateral lower extremity swelling/edema. Prior study done                   12/15/20.  Limitations:  PROCEDURES:  Bilateral lower extremity venous duplex imaging.  The following venous structures were evaluated: common femoral, profunda  femoral, greater saphenous, femoral, popliteal , peroneal and posterior  tibial veins.  Serial compression, augmentation maneuvers,  color and spectral Doppler  flow evaluations were performed.  FINDINGS:  Bilateral lower extremities -  No superficial or deep venous thrombosis.  Complete color filling and compressibility with normal venous flow dynamics  including spontaneous flow, response to augmentation maneuvers, and  respiratory phasicity.  The peroneal and posterior tibial veins are difficult to assess for  compressibility, but flow response to augmentation is demonstrated.  Alo Mohamud MD  (Electronically Signed)  Final  Date:      2021                   09:54    EC-ECHOCARDIOGRAM COMPLETE W/O CONT    Result Date: 2021  Transthoracic Echo Report Echocardiography Laboratory CONCLUSIONS Mildly depressed left ventricular function.  Left ventricular ejection fraction is visually estimated to be 45%, likely affected by rapid heart rate. Endocardial borders not well visualized to accurate assess wall motion abnormalities. Recommend repeating limited study with contrast. The right ventricle was grossly normal in size and function. Severely dilated left atrium. Known mitral valve bioprosthesis which is functioning normally, transvalvular gradient is 6.5 mmHg @. Moderate tricuspid regurgitation. Pleural effusion noted. Compared to the study done on 2020, LVEF is measured lower, likely affected by rapid heart rate. Otherwise, no significant changes. AILYN MCINTOSH Exam Date:         2021                    20:52 Exam Location:     Inpatient Priority:          Routine Ordering Physician:        FIORDALIZA FERGUSON                             ([id number]) Referring Physician:       [id number] Amor Sonographer:               Jasbir Alston [facility] Age:    65     Gender:    M MRN:    [id number] :    1955 BSA:    2.29   Ht (in):    70     Wt (lb):    250 Exam Type:     Complete Indications:     Persistent atrial fibrillation ICD Codes:       I48.1 CPT Codes:       03140 BP:   94     /   66     HR: Technical Quality:       Poor MEASUREMENTS  (Male / Female) Normal Values 2D ECHO LV Diastolic Diameter PLAX        4.6 cm                4.2 - 5.9 / 3.9 - 5.3 cm LV Systolic Diameter PLAX         2.9 cm                2.1 - 4.0 cm IVS Diastolic Thickness           1.2 cm                LVPW Diastolic Thickness          1.3 cm                LVOT Diameter                     2.2 cm                LV Ejection Fraction MOD BP       54.8 %                >= 55  % LV Ejection Fraction MOD 4C       48.2 %                LV Ejection Fraction  MOD 2C       54.3 %                IVC Diameter                      2.1 cm                M-MODE Aortic Root Diameter MM           4.3 cm                DOPPLER AV Peak Velocity                  1.1 m/s               AV Peak Gradient                  5.1 mmHg              AV Mean Gradient                  3.1 mmHg              LVOT Peak Velocity                0.85 m/s              AV Area Cont Eq vti               2.7 cm2               MV Velocity Time Integral         35 cm                 TR Peak Velocity                  249 cm/s              PV Peak Velocity                  0.99 m/s              PV Peak Gradient                  3.9 mmHg              RVOT Peak Velocity                0.82 m/s              * Indicates values subject to auto-interpretation LV EF:        % FINDINGS Left Ventricle Normal left ventricular size.  Mild concentric left ventricular hypertrophy.  Mildly depressed left ventricular function.  Left ventricular ejection fraction is visually estimated to be 45%. Endocardial borders not well visualized to accurate assess wall motion abnormalities.  Diastolic function indeterminate due to mitral valve replacement and atrial fibrillation. Right Ventricle The right ventricle was grossly normal in size and function. Right Atrium Enlarged right atrium. Left Atrium Severely dilated left atrium. Left atrial volume index is 80  mL/sq m. Mitral Valve Known mitral valve bioprosthesis which is functioning normally, transvalvular gradient is 6.5 mmHg @120. Aortic Valve Structurally normal aortic valve without significant stenosis or regurgitation. Tricuspid Valve Structurally normal tricuspid valve. No tricuspid stenosis. Moderate tricuspid regurgitation. Estimated right ventricular systolic pressure  is 35 mmHg. Pulmonic Valve Structurally normal pulmonic valve without significant stenosis or regurgitation. Pericardium No pericardial effusion seen. Pleural effusion noted. Aorta The aortic root is  normal. Yogesh Galindo MD (Electronically Signed) Final Date:     2021                 22:48    EC-KD W/O CONT    Result Date: 2021  Transesophageal Echo Report Echocardiography Laboratory CONCLUSIONS No evidence of endocarditis. Mildly reduced left ventricular systolic function. Left ventricular ejection fraction is visually estimated to be 45%. Normal right ventricular systolic function. The left atrium is surgically absent. Known mitral valve bioprosthesis which is functioning normally with appropriate transvalvular gradient. Structurally normal tricuspid valve without significant stenosis Moderate tricuspid regurgitation. Estimated right ventricular systolic pressure  is 35 mmHg. AILYN MCINTOSH Exam Date:          2021                     11:02 Exam Location:      Inpatient Priority:            Routine Ordering Physician:        JACK CEDENO  ([id number]) Referring Physician:       RADHA Kinney Sonographer:               Yonis Wilder                            MARCO Age:    65     Gender:    M MRN:    [id number] :    1955 BSA:    2.31   Ht (in):    70     Wt (lb):    254 Report Type:      Complete Indications:     Endocarditis and heart valve disorders in diseases                  classified elsewhere ICD Codes:       I39 CPT Codes:       11636 BP:   107    /   77     HR: Technical Quality:       Good MEASUREMENTS  (Male / Female) Normal Values 2D ECHO Estimated LV Ejection Fraction    45 %                  * Indicates values subject to auto-interpretation LV EF:  45    % Medications The patient was sedated on a ventilator. An additional 2 mg IV versed was given, I personally supervised moderate sedation with the bedside ICU nurse from 11:05 to 11:16 AM Limitations none Complications none Proc. Components The probe was inserted and manipulated by Dr. Cedeno. Probe # Epiq 2  was used for this procedure. FINDINGS Left Ventricle Mildly reduced left  "ventricular systolic function. Left ventricular ejection fraction is visually estimated to be 45%. Right Ventricle Moderately dilated right ventricle. Normal right ventricular systolic function.  Pacer wire seen in right ventricle. Right Atrium The right atrium is normal in size.  Pacemaker wire present in the right atrial cavity. Left Atrium The left atrium is surgically absent. LA Appendage Normal left atrial appendage. IA Septum The interatrial septum is normal. IV Septum The interventricular septum is normal. Mitral Valve Known mitral valve bioprosthesis which is functioning normally with appropriate transvalvular gradient. No valvular vegetations. Aortic Valve Structurally normal aortic valve without significant stenosis or regurgitation.  No valvular vegetations. Tricuspid Valve Structurally normal tricuspid valve without significant stenosis Moderate tricuspid regurgitation. Estimated right ventricular systolic pressure  is 35 mmHg.   No valvular vegetations. Pulmonic Valve Structurally normal pulmonic valve without significant stenosis or regurgitation. Pericardium Normal pericardium without effusion. Aorta The aortic root is normal. Josafat Cedeno MD (Electronically Signed) Final Date:     06 February 2021                 13:06      Micro:  Results     Procedure Component Value Units Date/Time    BLOOD CULTURE [[id number]] Collected: 02/10/21 1050    Order Status: Completed Specimen: Blood from Peripheral Updated: 02/10/21 1121    Narrative:      Per Hospital Policy: Only change Specimen Src: to \"Line\" if  specified by physician order.    BLOOD CULTURE [[id number]] Collected: 02/10/21 1050    Order Status: Completed Specimen: Blood from Peripheral Updated: 02/10/21 1105    Narrative:      Per Hospital Policy: Only change Specimen Src: to \"Line\" if  specified by physician order.    Blood Culture [[id number]] Collected: 02/04/21 1757    Order Status: Completed Specimen: Blood from Peripheral Updated: " "02/09/21 2101     Significant Indicator NEG     Source BLD     Site PERIPHERAL     Culture Result No growth after 5 days of incubation.    Narrative:      From different peripheral sites, if not done within the last  24 hours (Per Hospital Policy: Only change specimen source to  \"Line\" if specified by physician order)  No site indicated    URINE CULTURE(NEW) [[id number]] Collected: 02/04/21 2148    Order Status: Completed Specimen: Urine Updated: 02/07/21 0826     Significant Indicator NEG     Source UR     Site -     Culture Result No growth at 48 hours.    Narrative:      Indication for culture:->Evaluation for sepsis without a  clear source of infection  Have you been in close contact with a person who is suspected  or known to be positive for COVID-19 within the last 30 days  (e.g. last seen that person < 30 days ago)->No  Indication for culture:->Evaluation for sepsis without a    BLOOD CULTURE [[id number]]  (Abnormal)  (Susceptibility) Collected: 02/04/21 1630    Order Status: Completed Specimen: Blood from Peripheral Updated: 02/07/21 0719     Significant Indicator POS     Source BLD     Site PERIPHERAL     Culture Result Growth detected by Bactec instrument. 02/05/2021  05:51      Streptococcus pyogenes (Group A)  This isolate does NOT demonstrate inducible Clindamycin  resistance in vitro.      Narrative:      CALL  Swain  19 tel. [phone],  CALLED  19 tel. [phone] 02/06/2021, 10:44, RB PERF. RESULTS CALLED TO:  [id number] RN  Per Hospital Policy: Only change Specimen Src: to \"Line\" if  specified by physician order.  No site indicated    Susceptibility     Streptococcus pyogenes (group a) (1)     Antibiotic Interpretation Microscan Method Status    Penicillin Sensitive 0.032 mcg/mL E-TEST Final    Cefotaxime Sensitive 0.023 mcg/mL E-TEST Final                   BLOOD CULTURE [[id number]] Collected: 02/06/21 2043    Order Status: Completed Specimen: Blood from Peripheral Updated: 02/07/21 0558     Significant " "Indicator NEG     Source BLD     Site PERIPHERAL     Culture Result No Growth  Note: Blood cultures are incubated for 5 days and  are monitored continuously.Positive blood cultures  are called to the RN and reported as soon as  they are identified.      Narrative:      Collected By:[id number] PRINCE CAS SKY  Per Hospital Policy: Only change Specimen Src: to \"Line\" if  specified by physician order.  Left Forearm/Arm    BLOOD CULTURE [[id number]] Collected: 02/06/21 2057    Order Status: Completed Specimen: Blood from Peripheral Updated: 02/07/21 0558     Significant Indicator NEG     Source BLD     Site PERIPHERAL     Culture Result No Growth  Note: Blood cultures are incubated for 5 days and  are monitored continuously.Positive blood cultures  are called to the RN and reported as soon as  they are identified.      Narrative:      Collected By:[id number] PRINCE CAS SKY  Per Hospital Policy: Only change Specimen Src: to \"Line\" if  specified by physician order.  Right Forearm/Arm    E-Test [[id number]] Collected: 02/04/21 1630    Order Status: Completed Specimen: Other Updated: 02/06/21 1045     ETEST Sensitivity INTERIM    Narrative:      19 tel. [phone] 02/06/2021, 10:44, RB PERF. RESULTS CALLED TO:  [id number] RN  Per Hospital Policy: Only change Specimen Src: to \"Line\" if  specified by physician order.    MRSA By PCR (Amp) [[id number]] Collected: 02/05/21 0930    Order Status: Completed Specimen: Respirate from Nares Updated: 02/05/21 1758     Significant Indicator NEG     Source RESP     Site NARES     MRSA PCR Negative for MRSA by PCR.    Narrative:      Collected By:[id number] TERRY THOMAS  Collected By:[id number] TERRY THOMAS    Urinalysis [[id number]]  (Abnormal) Collected: 02/04/21 2148    Order Status: Completed Specimen: Urine, [place] Cath Updated: 02/04/21 2229     Color DK Yellow     Character Turbid     Specific Gravity 1.033     Ph 5.0     Glucose Negative mg/dL      Ketones Negative mg/dL      Protein 100 mg/dL " "     Bilirubin Moderate     Urobilinogen, Urine 0.2     Nitrite Negative     Leukocyte Esterase Trace     Occult Blood Large     Micro Urine Req Microscopic    Narrative:      If not done within the last 24 hours    Blood Culture [[id number]]     Order Status: Canceled Specimen: Blood from Peripheral     COV-2, FLU A/B, AND RSV BY PCR (2-4 HOURS CEPHEID): Collect NP swab in VTM [[id number]] Collected: 02/04/21 1710    Order Status: Completed Specimen: Respirate Updated: 02/04/21 1814     Influenza virus A RNA Negative     Influenza virus B, PCR Negative     RSV, PCR Negative     SARS-CoV-2 by PCR NotDetected     Comment: PATIENTS: Important information regarding your results and instructions can  be found at [email or web address]   \"After your  Covid-19 Test\"  [facility] providers: PLEASE REFER TO DE-ESCALATION AND RETESTING PROTOCOL  on [email or web address]  **The [employer] GeneXpert Xpress SARS-CoV-2 Test has been made available for  use under the Emergency Use Authorization (EUA) only.          SARS-CoV-2 Source NP Swab    Narrative:      Indication for culture:->Evaluation for sepsis without a  clear source of infection  Have you been in close contact with a person who is suspected  or known to be positive for COVID-19 within the last 30 days  (e.g. last seen that person < 30 days ago)->No    BLOOD CULTURE [[id number]]     Order Status: Canceled Specimen: Blood from Peripheral     URINALYSIS [[id number]] Collected: 02/04/21 0000    Order Status: Canceled Specimen: Urine           Assessment:  Active Hospital Problems    Diagnosis    • *Fluid overload secondary to IV fluids  [E87.70]    • Cellulitis of right lower extremity [L03.115]    • Atrial fibrillation with RVR ([facility]) [I48.91]    • Sepsis ([facility]) [A41.9]    • COPD (chronic obstructive pulmonary disease) ([facility]) [J44.9]    • Type 2 diabetes mellitus without complication, without long-term current use of insulin ([facility]) [E11.9]    • Lactic acid acidosis " [E87.2]    • Acute respiratory failure with hypoxia ([facility]) [J96.01]    • Paroxysmal SVT (supraventricular tachycardia) ([facility]) [I47.1]      Interval 24 hours:      AF, O2 RA  Labs reviewed  Imaging personally reviewed both images and report.   Studies reviewed  Micro reviewed    Patient complaining of nausea today, no vomiting and has had some diarrhea.  His right leg continues to have edema, erythema and warmth.  Some increase in WBC today.  Patient's antibiotics transition from penicillin to ceftriaxone and Flagyl.       IMPRESSION:   Septic shock, possible toxic shock syndrome, resolved       Group A streptococcus bacteremia.  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 obese man with a history of a bioprosthetic mitral valve replacement in March 2017, type 2 diabetes mellitus, pacemaker placement in July 2014, coronary artery disease and COPD admitted after being found down.  Patient admitted for septic shock secondary to severe right lower extremity cellulitis with group A streptococcal bacteremia.  Blood culture on 2/4 (1 out of 2 sets) + group A streptococcus.  Repeat blood cultures on 2/6 remain negative to date.  KD without any evidence of prosthetic valve or native valve endocarditis.     --Stop IV penicillin and will broaden antibiotics to ceftriaxone and Flagyl given the increase in WBC and ongoing significant cellulitis without improvement    --- CT right lower leg to ensure no underlying abscess or concern for fasciitis  --- Antticipate a 14-day course of antibiotics from 2/6.  Stop date 2/20/2021  --- Large watery BMs this morning, if this persists then would obtain C.  difficile testing      Discussed with Dr. Shah. ID will continue to follow.

## 2021-02-10 NOTE — PROGRESS NOTES
Assumed care of pt. Bedside report received from Solo AKERS. Pt was updated on plan of care. Call light, phone and personal belongings in reach. Bed alarm on and working properly, bed in lowest position, and locked. Pt is complaining of 6 out of 10 pain, medication given per MAR

## 2021-02-10 NOTE — PROGRESS NOTES
Assumed care of patient at bedside report from NOC RN. Updated on POC. Patient currently sleeping. Call light within reach. Whiteboard updated. Fall precautions in place. Bed locked and in lowest position.

## 2021-02-10 NOTE — CARE PLAN
Problem: Respiratory:  Goal: Respiratory status will improve  Outcome: PROGRESSING AS EXPECTED     Problem: Pain Management  Goal: Pain level will decrease to patient's comfort goal  Outcome: PROGRESSING SLOWER THAN EXPECTED     Problem: Knowledge Deficit  Goal: Knowledge of disease process/condition, treatment plan, diagnostic tests, and medications will improve  Outcome: PROGRESSING AS EXPECTED     Problem: Respiratory:  Goal: Respiratory status will improve  2/9/2021 2139 by Aisha Reddy R.N.  Outcome: PROGRESSING AS EXPECTED  2/9/2021 2139 by Aisha Reddy R.N.  Outcome: PROGRESSING AS EXPECTED

## 2021-02-11 NOTE — WOUND TEAM
[facility]  Inpatient Services   Wound and Skin Care Evaluation    Admission Date: 2021     Last order of IP CONSULT TO WOUND CARE was found on 2021 from Hospital Encounter on 2021     HPI, PMH, SH: Reviewed    Past Surgical History:   Procedure Laterality Date   • MITRAL VALVE REPLACE  3/8/2017    Procedure: MITRAL VALVE REPLACE-REDO;  Surgeon: Cornelia Wright M.D.;  Location: SURGERY [facility];  Service:    • KD  3/8/2017    Procedure: KD;  Surgeon: Cornelia Wright M.D.;  Location: SURGERY [facility];  Service:    • IRRIGATION & DEBRIDEMENT GENERAL  2009    Performed by MICHEL URBINA at SURGERY [facility]   • WIDE EXCISION  2009    Performed by MICHEL URBINA at [facility]   • ANGIOGRAM  2009    Performed by MICHEL URBINA at [facility]   • CATH REMOVAL  2009    Performed by MICHEL URBINA at [facility]   • THROMBECTOMY  2009    Performed by MICHEL URBINA at SURGERY [facility]   • EMBOLECTOMY  2009    Performed by MICHEL URBINA at SURGERY [facility]   • ANGIOGRAM  7/3/2009    Performed by MORGAN WARD at SURGERY [facility]   • MITRAL VALVE REPLACE  3/14/08    Performed by CORNELIA WRIGHT at SURGERY [facility]   • MAZE PROCEDURE  3/14/08    Performed by CORNELIA WRIGHT at [facility]   • OTHER CARDIAC SURGERY  2008    mitral valve replacement   • AORTIC VALVE REPLACEMENT     • OTHER ABDOMINAL SURGERY      imbulica repair      Social History     Tobacco Use   • Smoking status: Former Smoker     Packs/day: 2.50     Years: 9.00     Pack years: 22.50     Types: Cigarettes     Quit date: 1981     Years since quittin.1   • Smokeless tobacco: Never Used   Substance Use Topics   • Alcohol use: No     Chief Complaint   Patient presents with   • Chest Pain   • Shortness of Breath   • Fall Less than 10 Feet     down for 2-4 days at an [facility].           Diagnosis: Acute respiratory failure with hypoxia (HCC) [J96.01]  Cellulitis and abscess of right leg [L03.115, L02.415]    Unit where seen by Wound Team: T816/01     WOUND CONSULT/FOLLOW UP RELATED TO:  F/u on RLE    WOUND HISTORY:  Unknown etiology.  Admitted with cellulitis.    WOUND ASSESSMENT/LDA   Wound 02/08/21 Soft Tissue Necrosis Leg Right cellulitis/vasculitis?  (Active)           Site Assessment Red;Purple;Painful    Periwound Assessment Red;Painful;Blistered;Pink    Margins Undefined edges    Closure None    Drainage Amount Scant    Drainage Description Serous    Treatments Site care    Wound Cleansing Normal Saline Irrigation    Periwound Protectant Not Applicable    Dressing Cleansing/Solutions Not Applicable    Dressing Options Petrolatum Gauze (yellow)    Dressing Changed Changed    Dressing Status Intact    Dressing Change/Treatment Frequency Every Shift, and As Needed    NEXT Dressing Change/Treatment Date 02/12/21    NEXT Weekly Photo (Inpatient Only) 02/18/21    Non-staged Wound Description Partial thickness    Tunneling (cm) 0 cm    Wound Odor None    Pulses 1+;DP;Thready    Exposed Structures None            Wound 02/11/21 Full Thickness Wound Leg Posterior Left (Active)   Site Assessment Red 02/11/21 1301   Periwound Assessment Dry;Intact 02/11/21 1301   Margins Attached edges 02/11/21 1301   Closure None 02/11/21 1301   Drainage Amount Scant 02/11/21 1301   Drainage Description Yellow 02/11/21 1301   Treatments Site care 02/11/21 1301   Wound Cleansing Normal Saline Irrigation 02/11/21 1301   Periwound Protectant Not Applicable 02/11/21 1301   Dressing Cleansing/Solutions Not Applicable 02/11/21 1301   Dressing Options Hydrofiber Silver 02/11/21 1301   Dressing Changed Changed 02/11/21 1301   Dressing Status Intact 02/11/21 1301   Dressing Change/Treatment Frequency Every 48 hrs, and As Needed 02/11/21 1301   NEXT Dressing Change/Treatment Date 02/13/21 02/11/21 1301   NEXT Weekly Photo  (Inpatient Only) 02/13/21 02/11/21 1301   Non-staged Wound Description Full thickness 02/11/21 1301   Wound Length (cm) 1 cm 02/11/21 1301   Wound Width (cm) 1 cm 02/11/21 1301   Wound Depth (cm) 0.4 cm 02/11/21 1301   Wound Surface Area (cm^2) 1 cm^2 02/11/21 1301   Wound Volume (cm^3) 0.4 cm^3 02/11/21 1301   Tunneling (cm) 0 cm 02/11/21 1301   Shape round 02/11/21 1301   Wound Odor None 02/11/21 1301   Exposed Structures None 02/11/21 1301          Vascular:    MARJORIE:   No results found.    Lab Values:    Lab Results   Component Value Date/Time    WBC 16.5 (H) 02/11/2021 04:08 AM    RBC 3.04 (L) 02/11/2021 04:08 AM    HEMOGLOBIN 9.6 (L) 02/11/2021 04:08 AM    HEMATOCRIT 29.5 (L) 02/11/2021 04:08 AM    CREACTPROT 3.47 (H) 12/19/2019 12:26 PM    SEDRATEWES 65 (H) 02/04/2021 09:40 PM    HBA1C 7.6 (H) 11/20/2020 09:41 AM        Culture Results show:  No results found for this or any previous visit (from the past 720 hour(s)).    Pain Level/Medicated:  Pain with site care.         INTERVENTIONS BY WOUND TEAM:  Chart and images reviewed. Discussed with bedside RN. This RN in to assess patient. Performed standard wound care which includes appropriate positioning, dressing removal and non-selective debridement. Pictures and measurements obtained weekly if/when required.  RLE:  Preparation for Dressing removal: none  Cleansed with:  NS and gauze  Sharp debridement: none  Jewels wound: Cleansed with NS and gauze  Primary Dressing: xeroform   Secondary (Outer) Dressing: ABD and roll gauze    LLE  Preparation for Dressing removal: none  Cleansed with: NS and gauze  Sharp debridement: NA  Jewels wound: Cleansed with NS and gauze  Primary Dressing: hydrofiber silver  Secondary (Outer) Dressing: gauze and tape    Interdisciplinary consultation: Patient, Bedside RN Jose Angel    EVALUATION / RATIONALE FOR TREATMENT:  Most Recent Date:  2/11:  Increased edema with more ruptured bullae on medial and lateral aspect of leg. New  bullae forming on medial ankle. Purple dislocation more pronounced, unsure if this is a sign of demarcation. Erythema  does not appear to be extending, no induration palpated, no odor noted. Wound bed on ruptured bullae is not granular tissue which may potentially indicate further evolvement of wound. Wound team will continue to follow. Will benefit from outpatient wound care upon discharge.    Posterior LLE dressed with hydrofiber silver to manage bioburden, absorb exudate, and maintain a moist wound environment without laterally wicking exudate therefore reducing brody-wound maceration      02/08: admitted with  Cellulitis and RLE abscess.  May benefit from further imaging to see extent of abscess/fluid accumulation.  Xeroform applied for bacteriostatic and non adherent.  ABD and roll gauze for absorption.         Goals: Steady decrease in wound area and depth weekly.    WOUND TEAM PLAN OF CARE ([X] for frequency of wound follow up,):   Nursing to follow orders written for wound care. Contact wound team if area fails to progress, deteriorates or with any questions/concerns  Dressing changes by wound team:                   Follow up 3 times weekly:                NPWT change 3 times weekly:     Follow up 1-2 times weekly:    X nursing to perform dsg/skin care; WT following  Follow up Bi-Monthly:                   Follow up as needed:     Other (explain):     NURSING PLAN OF CARE ORDERS (X):  Dressing changes: See Dressing Care orders: X  Skin care: See Skin Care orders:   RN Prevention Protocol: X  Rectal tube care: See Rectal Tube Care orders:   Other orders:      Anticipated discharge plans:   LTACH:        SNF/Rehab:   x               Home Health Care:           Outpatient Wound Center:            Self/Family Care:        Other:

## 2021-02-11 NOTE — CARE PLAN
Problem: Venous Thromboembolism (VTW)/Deep Vein Thrombosis (DVT) Prevention:  Goal: Patient will participate in Venous Thrombosis (VTE)/Deep Vein Thrombosis (DVT)Prevention Measures  Outcome: PROGRESSING AS EXPECTED     Problem: Knowledge Deficit  Goal: Knowledge of disease process/condition, treatment plan, diagnostic tests, and medications will improve  Outcome: PROGRESSING AS EXPECTED     Problem: Respiratory:  Goal: Respiratory status will improve  Outcome: PROGRESSING AS EXPECTED

## 2021-02-11 NOTE — PROGRESS NOTES
Infectious Disease Progress Note    Author: Luciana Ingram M.D. Date & Time of service: 2021  10:15 AM    Chief Complaint:  Septic shock, RLE cellulitits. GAS bacteremia     Interval History:   AF, O2 RA 2 L NC, pressors remain off. States the leg still is painful but improving overall.  He is continued on IV penicillin as below.   AF, O2 2 L NC, ongoing edema and drainage of right leg  2/10 AF, O2 RA, complaining of nausea today, no vomiting and has had some diarrhea.  His right leg continues to have edema, erythema and warmth.  Some increase in WBC today. Patient's antibiotics transition from penicillin to ceftriaxone and Flagyl.     Review of Systems:  Review of Systems   Constitutional: Positive for malaise/fatigue. Negative for chills and fever.   Gastrointestinal: Positive for diarrhea. Negative for abdominal pain, constipation, nausea and vomiting.   Musculoskeletal: Positive for myalgias. Negative for joint pain.   Psychiatric/Behavioral: The patient is nervous/anxious.        Hemodynamics:  Temp (24hrs), Av.6 °C (97.8 °F), Min:36.2 °C (97.2 °F), Max:37 °C (98.6 °F)  Temperature: 37 °C (98.6 °F)  Pulse  Av.9  Min: 75  Max: 193   Blood Pressure : 125/62       Physical Exam:  Physical Exam  Constitutional:       Appearance: Normal appearance. He is obese.   Cardiovascular:      Rate and Rhythm: Normal rate and regular rhythm.      Heart sounds: Normal heart sounds.   Pulmonary:      Effort: Pulmonary effort is normal.      Breath sounds: Normal breath sounds.   Abdominal:      General: Abdomen is flat. Bowel sounds are normal.      Palpations: Abdomen is soft.   Musculoskeletal:         General: Tenderness and signs of injury present.      Right lower leg: Edema present.      Left lower leg: Edema present.      Comments: Right leg with ongoing erythema, edema, superficial necrotic skin changes, some blistering and ongoing weeping   Skin:     General: Skin is warm and dry.      Neurological:      General: No focal deficit present.      Mental Status: He is alert and oriented to person, place, and time.   Psychiatric:         Mood and Affect: Mood normal.         Behavior: Behavior normal.         Meds:    Current Facility-Administered Medications:   •  colchicine  •  cefTRIAXone (ROCEPHIN) IV  •  metroNIDAZOLE  •  lisinopril  •  furosemide  •  ipratropium  •  colchicine  •  allopurinol  •  phosphorus  •  [DISCONTINUED] insulin regular **AND** POC Blood Glucose **AND** NOTIFY MD and PharmD **AND** dextrose 50%  •  insulin lispro  •  metoprolol SR  •  acetaminophen  •  zinc sulfate  •  multivitamin  •  [Held by provider] MD Alert...Warfarin per Pharmacy  •  [START ON 2/20/2021] amiodarone  •  amiodarone  •  acetaminophen  •  oxyCODONE immediate-release  •  morphine injection  •  Respiratory Therapy Consult  •  labetalol  •  ondansetron  •  ondansetron    Labs:  Recent Labs     02/10/21  0242 02/10/21  1248 02/11/21  0408   WBC 16.1* 15.1* 16.5*   RBC 3.17* 3.03* 3.04*   HEMOGLOBIN 10.1* 9.6* 9.6*   HEMATOCRIT 31.1* 29.7* 29.5*   MCV 98.1* 98.0* 97.0   MCH 31.9 31.7 31.6   RDW 63.4* 62.5* 60.9*   PLATELETCT 240 230 295   MPV 10.7 10.4 10.7   NEUTSPOLYS 73.90* 87.00* 82.60*   LYMPHOCYTES 4.30* 2.60* 5.20*   MONOCYTES 4.30 3.50 2.60   EOSINOPHILS 0.90 0.90 1.70   BASOPHILS 0.90 2.60* 0.00   RBCMORPHOLO Present Present Present     Recent Labs     02/09/21 0218 02/09/21  1018 02/10/21  0242 02/11/21  0408   SODIUM 136  --  136 132*   POTASSIUM 4.4  --  4.2 3.7   CHLORIDE 103  --  100 98   CO2 24  --  28 26   GLUCOSE 151*  --  118* 114*   BUN 19  --  20 24*   CPKTOTAL  --  1000* 828* 612*     Recent Labs     02/09/21  0218 02/10/21  0242 02/11/21  0408   ALBUMIN 2.7* 2.8* 2.8*   TBILIRUBIN 0.8 0.8 0.7   ALKPHOSPHAT 66 73 76   TOTPROTEIN 5.7* 6.0 5.8*   ALTSGPT 21 17 22   ASTSGOT 36 35 33   CREATININE 0.97 1.06 1.05       Imaging:  CT-EXTREMITY, LOWER WITH RIGHT    Result Date:  2/10/2021  2/10/2021 4:21 PM HISTORY/REASON FOR EXAM:  Soft tissue infection suspected, femur, initial exam. TECHNIQUE/EXAM DESCRIPTION AND NUMBER OF VIEWS:  CT scan of the RIGHT lower extremity with contrast, with reconstructions. Thin helical 3 mm sections were obtained from the distal femur through the proximal tibia/fibula. Sagittal and coronal multiplanar reconstructions were generated from the axial images. A total of 100 mL of Omnipaque 350 nonionic contrast was administered  IV without complication. Up to date radiation dose reduction adjustments have been utilized to meet ALARA standards for radiation dose reduction. COMPARISON: 12/20/2019 FINDINGS: There is relatively diffuse right lower extremity skin thickening, subcutaneous fat infiltration which could be related to edema and/or cellulitis and superficial fasciitis. There is no soft tissue gas suggests a necrotizing infection. No focal fluid collection. Mildly prominent right groin lymph node is probably reactive. Please note that the foot is incompletely visualized on this study. There are degenerative changes seen within the mid and hindfoot. Degenerative changes of the knee. There is no CT evidence for osteomyelitis. Small bilateral fat-containing inguinal hernias. There is diffuse atherosclerosis.     Extensive right lower extremity edema and/or cellulitis and superficial fasciitis. No soft tissue gas. No evidence of drainable abscess. No CT evidence of osteomyelitis. Atherosclerosis.    DX-CHEST-LIMITED (1 VIEW)    Result Date: 2/5/2021 2/5/2021 5:29 PM HISTORY/REASON FOR EXAM:  CL placement TECHNIQUE/EXAM DESCRIPTION AND NUMBER OF VIEWS: Single AP view of the chest. COMPARISON: 2/5/2021 FINDINGS: Right central line projects over the SVC. Enteric tube passes below the level of the diaphragm. Endotracheal tube projects at the level of the aortic arch. There is right-sided pacer. Sternotomy wires are seen. The cardiomediastinal silhouette is stable.   There is blunting of the right costophrenic angle. There is hazy right basilar opacity which is unchanged. No pneumothorax is identified.     Right central line projects over the SVC. No pneumothorax. Small right pleural effusion with hazy right basilar opacity which may represent atelectasis or pneumonitis. Stable prominence of the cardiomediastinal silhouette.     DX-CHEST-PORTABLE (1 VIEW)    Result Date: 2/8/2021 2/7/2021 11:30 PM HISTORY/REASON FOR EXAM:  For indication of respiratory failure; For indication of respiratory failure TECHNIQUE/EXAM DESCRIPTION AND NUMBER OF VIEWS: Single portable view of the chest. COMPARISON: Study from earlier the same date at 6:24 AM FINDINGS: Hardware is stably positioned in its visualized extent. HEART: Stable size. LUNGS: Lung volumes are low. There are perihilar opacities. There are bibasilar opacities. PLEURA: Neither costophrenic sulcus is well seen     1.  Increased BILATERAL atelectasis which could obscure an additional process such as pulmonary edema or pneumonia 2.  Possible BILATERAL pleural effusions    DX-CHEST-PORTABLE (1 VIEW)    Result Date: 2/7/2021 2/7/2021 6:12 AM HISTORY/REASON FOR EXAM:  For indication of respiratory failure; For indication of respiratory failure TECHNIQUE/EXAM DESCRIPTION AND NUMBER OF VIEWS: Single portable view of the chest. COMPARISON: Yesterday FINDINGS: The cardiomediastinal silhouettes are stable. There are ongoing bibasilar opacities, most likely atelectasis, worse on the right, as well as a small right pleural effusion. Endotracheal tube is no longer present. Right central venous line is unchanged in  position.     Stable bibasilar opacities, right greater than left.    DX-CHEST-PORTABLE (1 VIEW)    Result Date: 2/6/2021 2/6/2021 5:18 AM HISTORY/REASON FOR EXAM:  For indication of respiratory failure; For indication of respiratory failure TECHNIQUE/EXAM DESCRIPTION AND NUMBER OF VIEWS: Single portable view of the chest.  COMPARISON: Yesterday FINDINGS: The cardiomediastinal silhouettes are stable. There is ongoing bibasilar atelectasis, mainly on the right, but with a small right pleural effusion. Support devices are positionally unchanged.     Stable chest findings compared to 2/5.    DX-CHEST-PORTABLE (1 VIEW)    Result Date: 2/5/2021 2/5/2021 5:22 AM HISTORY/REASON FOR EXAM:  For indication of respiratory failure; For indication of respiratory failure TECHNIQUE/EXAM DESCRIPTION AND NUMBER OF VIEWS: Single portable view of the chest. COMPARISON: Yesterday FINDINGS: Cardiac size is unchanged. Interstitial opacities persist in the mid and lower lung fields bilaterally, along with right basilar atelectasis and probable small right pleural effusion. Endotracheal tube is positionally unchanged.     Ongoing findings consistent with mild pulmonary edema, with right basilar atelectasis and small right pleural effusion. No interval change.    DX-CHEST-PORTABLE (1 VIEW)    Result Date: 2/4/2021 2/4/2021 7:55 PM HISTORY/REASON FOR EXAM:  Endotracheal tube placement TECHNIQUE/EXAM DESCRIPTION AND NUMBER OF VIEWS: Single portable view of the chest. COMPARISON: Prior image today at 1643 hours FINDINGS: An endotracheal tube is present, the tip of which is 2 to 3 cm above the corina. Overall cardiopulmonary appearance is unchanged.     Endotracheal tube in place as noted above. Stable cardiopulmonary appearance.    DX-CHEST-PORTABLE (1 VIEW)    Result Date: 2/4/2021 2/4/2021 4:38 PM HISTORY/REASON FOR EXAM:  possible sepsis.. Shortness of breath TECHNIQUE/EXAM DESCRIPTION AND NUMBER OF VIEWS: Single portable view of the chest. COMPARISON: December 17, 2020 FINDINGS: Patient is status post cardiac surgery. Single-lead right-sided pacemaker present. External pacer pads are present. There is right pleural fluid. There is vascular congestion. No pneumothorax.     Enlarged cardiac silhouette status post cardiac surgery with small right-sided  pleural effusion and vascular congestion.    US-EXTREMITY VENOUS LOWER BILAT    Result Date: 2021   Vascular Laboratory  CONCLUSIONS  No superficial or deep venous thrombosis.  AILYN MCINTOSH  Exam Date:     2021 09:32  Room #:     Inpatient  Priority:     Stat  Ht (in):             Wt (lb):  Ordering Physician:        JO ANN GARCIA  Referring Physician:       [id number]  Sonographer:               Gladys Clark RVSHAYE  Study Type:                Complete Bilateral  Technical Quality:         Adequate  Age:    65    Gender:     M  MRN:    [id number]  :    1955      BSA:  Indications:     Edema, unspecified, Localized swelling, mass and lump,                   unspecified lower limb  CPT Codes:       31715  ICD Codes:       R60.9  R22.40  History:         Bilateral lower extremity swelling/edema. Prior study done                   12/15/20.  Limitations:  PROCEDURES:  Bilateral lower extremity venous duplex imaging.  The following venous structures were evaluated: common femoral, profunda  femoral, greater saphenous, femoral, popliteal , peroneal and posterior  tibial veins.  Serial compression, augmentation maneuvers,  color and spectral Doppler  flow evaluations were performed.  FINDINGS:  Bilateral lower extremities -  No superficial or deep venous thrombosis.  Complete color filling and compressibility with normal venous flow dynamics  including spontaneous flow, response to augmentation maneuvers, and  respiratory phasicity.  The peroneal and posterior tibial veins are difficult to assess for  compressibility, but flow response to augmentation is demonstrated.  Alo Mohamud MD  (Electronically Signed)  Final Date:      2021                   09:54    EC-ECHOCARDIOGRAM COMPLETE W/O CONT    Result Date: 2021  Transthoracic Echo Report Echocardiography Laboratory CONCLUSIONS Mildly depressed left ventricular function.  Left ventricular ejection fraction is visually  estimated to be 45%, likely affected by rapid heart rate. Endocardial borders not well visualized to accurate assess wall motion abnormalities. Recommend repeating limited study with contrast. The right ventricle was grossly normal in size and function. Severely dilated left atrium. Known mitral valve bioprosthesis which is functioning normally, transvalvular gradient is 6.5 mmHg @. Moderate tricuspid regurgitation. Pleural effusion noted. Compared to the study done on 2020, LVEF is measured lower, likely affected by rapid heart rate. Otherwise, no significant changes. DAYNAAILYN ARMSTRONG Exam Date:         2021                    20:52 Exam Location:     Inpatient Priority:          Routine Ordering Physician:        FIORDALIZA FERGUSON                             ([id number]) Referring Physician:       MARTY Theodore Sonographer:               Jasbir Alston RDCS Age:    65     Gender:    M MRN:    [id number] :    1955 BSA:    2.29   Ht (in):    70     Wt (lb):    250 Exam Type:     Complete Indications:     Persistent atrial fibrillation ICD Codes:       I48.1 CPT Codes:       12293 BP:   94     /   66     HR: Technical Quality:       Poor MEASUREMENTS  (Male / Female) Normal Values 2D ECHO LV Diastolic Diameter PLAX        4.6 cm                4.2 - 5.9 / 3.9 - 5.3 cm LV Systolic Diameter PLAX         2.9 cm                2.1 - 4.0 cm IVS Diastolic Thickness           1.2 cm                LVPW Diastolic Thickness          1.3 cm                LVOT Diameter                     2.2 cm                LV Ejection Fraction MOD BP       54.8 %                >= 55  % LV Ejection Fraction MOD 4C       48.2 %                LV Ejection Fraction MOD 2C       54.3 %                IVC Diameter                      2.1 cm                M-MODE Aortic Root Diameter MM           4.3 cm                DOPPLER AV Peak Velocity                  1.1 m/s               AV Peak Gradient                  5.1 mmHg               AV Mean Gradient                  3.1 mmHg              LVOT Peak Velocity                0.85 m/s              AV Area Cont Eq vti               2.7 cm2               MV Velocity Time Integral         35 cm                 TR Peak Velocity                  249 cm/s              PV Peak Velocity                  0.99 m/s              PV Peak Gradient                  3.9 mmHg              RVOT Peak Velocity                0.82 m/s              * Indicates values subject to auto-interpretation LV EF:        % FINDINGS Left Ventricle Normal left ventricular size.  Mild concentric left ventricular hypertrophy.  Mildly depressed left ventricular function.  Left ventricular ejection fraction is visually estimated to be 45%. Endocardial borders not well visualized to accurate assess wall motion abnormalities.  Diastolic function indeterminate due to mitral valve replacement and atrial fibrillation. Right Ventricle The right ventricle was grossly normal in size and function. Right Atrium Enlarged right atrium. Left Atrium Severely dilated left atrium. Left atrial volume index is 80  mL/sq m. Mitral Valve Known mitral valve bioprosthesis which is functioning normally, transvalvular gradient is 6.5 mmHg @120. Aortic Valve Structurally normal aortic valve without significant stenosis or regurgitation. Tricuspid Valve Structurally normal tricuspid valve. No tricuspid stenosis. Moderate tricuspid regurgitation. Estimated right ventricular systolic pressure  is 35 mmHg. Pulmonic Valve Structurally normal pulmonic valve without significant stenosis or regurgitation. Pericardium No pericardial effusion seen. Pleural effusion noted. Aorta The aortic root is normal. Yogesh Galindo MD (Electronically Signed) Final Date:     04 February 2021                 22:48    EC-KD W/O CONT    Result Date: 2/6/2021  Transesophageal Echo Report Echocardiography Laboratory CONCLUSIONS No evidence of endocarditis. Mildly reduced left ventricular  systolic function. Left ventricular ejection fraction is visually estimated to be 45%. Normal right ventricular systolic function. The left atrium is surgically absent. Known mitral valve bioprosthesis which is functioning normally with appropriate transvalvular gradient. Structurally normal tricuspid valve without significant stenosis Moderate tricuspid regurgitation. Estimated right ventricular systolic pressure  is 35 mmHg. AILYN MCINTOSH Exam Date:          2021                     11:02 Exam Location:      Inpatient Priority:            Routine Ordering Physician:        JACK CEDENO  ([id number]) Referring Physician:       RADHA Kinney Sonographer:               Yonis Wilder                            [facility] Age:    65     Gender:    M MRN:    [id number] :    1955 BSA:    2.31   Ht (in):    70     Wt (lb):    254 Report Type:      Complete Indications:     Endocarditis and heart valve disorders in diseases                  classified elsewhere ICD Codes:       I39 CPT Codes:       58816 BP:   107    /   77     HR: Technical Quality:       Good MEASUREMENTS  (Male / Female) Normal Values 2D ECHO Estimated LV Ejection Fraction    45 %                  * Indicates values subject to auto-interpretation LV EF:  45    % Medications The patient was sedated on a ventilator. An additional 2 mg IV versed was given, I personally supervised moderate sedation with the bedside ICU nurse from 11:05 to 11:16 AM Limitations none Complications none Proc. Components The probe was inserted and manipulated by Dr. Cedeno. Probe # [id number] 2  was used for this procedure. FINDINGS Left Ventricle Mildly reduced left ventricular systolic function. Left ventricular ejection fraction is visually estimated to be 45%. Right Ventricle Moderately dilated right ventricle. Normal right ventricular systolic function.  Pacer wire seen in right ventricle. Right Atrium The right atrium is normal in size.   "Pacemaker wire present in the right atrial cavity. Left Atrium The left atrium is surgically absent. LA Appendage Normal left atrial appendage. IA Septum The interatrial septum is normal. IV Septum The interventricular septum is normal. Mitral Valve Known mitral valve bioprosthesis which is functioning normally with appropriate transvalvular gradient. No valvular vegetations. Aortic Valve Structurally normal aortic valve without significant stenosis or regurgitation.  No valvular vegetations. Tricuspid Valve Structurally normal tricuspid valve without significant stenosis Moderate tricuspid regurgitation. Estimated right ventricular systolic pressure  is 35 mmHg.   No valvular vegetations. Pulmonic Valve Structurally normal pulmonic valve without significant stenosis or regurgitation. Pericardium Normal pericardium without effusion. Aorta The aortic root is normal. Josafat Cedeno MD (Electronically Signed) Final Date:     06 February 2021                 13:06      Micro:  Results     Procedure Component Value Units Date/Time    BLOOD CULTURE [[id number]] Collected: 02/10/21 1050    Order Status: Completed Specimen: Blood from Peripheral Updated: 02/11/21 0707     Significant Indicator NEG     Source BLD     Site PERIPHERAL     Culture Result No Growth  Note: Blood cultures are incubated for 5 days and  are monitored continuously.Positive blood cultures  are called to the RN and reported as soon as  they are identified.      Narrative:      Per Hospital Policy: Only change Specimen Src: to \"Line\" if  specified by physician order.  Left Hand    BLOOD CULTURE [[id number]] Collected: 02/10/21 1050    Order Status: Completed Specimen: Blood from Peripheral Updated: 02/11/21 0648     Significant Indicator NEG     Source BLD     Site PERIPHERAL     Culture Result No Growth  Note: Blood cultures are incubated for 5 days and  are monitored continuously.Positive blood cultures  are called to the RN and reported as soon " "as  they are identified.      Narrative:      Per Hospital Policy: Only change Specimen Src: to \"Line\" if  specified by physician order.  No site indicated    C Diff by PCR rflx Toxin [[id number]]     Order Status: Canceled Specimen: Stool     Blood Culture [[id number]] Collected: 02/04/21 1757    Order Status: Completed Specimen: Blood from Peripheral Updated: 02/09/21 2101     Significant Indicator NEG     Source BLD     Site PERIPHERAL     Culture Result No growth after 5 days of incubation.    Narrative:      From different peripheral sites, if not done within the last  24 hours (Per Hospital Policy: Only change specimen source to  \"Line\" if specified by physician order)  No site indicated    URINE CULTURE(NEW) [[id number]] Collected: 02/04/21 2148    Order Status: Completed Specimen: Urine Updated: 02/07/21 0826     Significant Indicator NEG     Source UR     Site -     Culture Result No growth at 48 hours.    Narrative:      Indication for culture:->Evaluation for sepsis without a  clear source of infection  Have you been in close contact with a person who is suspected  or known to be positive for COVID-19 within the last 30 days  (e.g. last seen that person < 30 days ago)->No  Indication for culture:->Evaluation for sepsis without a    BLOOD CULTURE [[id number]]  (Abnormal)  (Susceptibility) Collected: 02/04/21 1630    Order Status: Completed Specimen: Blood from Peripheral Updated: 02/07/21 0719     Significant Indicator POS     Source BLD     Site PERIPHERAL     Culture Result Growth detected by Bactec instrument. 02/05/2021  05:51      Streptococcus pyogenes (Group A)  This isolate does NOT demonstrate inducible Clindamycin  resistance in vitro.      Narrative:      CALL  Swain  19 tel. [phone],  CALLED  19 tel. [phone] 02/06/2021, 10:44, RB PERF. RESULTS CALLED TO:  [id number] RN  Per Hospital Policy: Only change Specimen Src: to \"Line\" if  specified by physician order.  No site indicated    Susceptibility  " "   Streptococcus pyogenes (group a) (1)     Antibiotic Interpretation Microscan Method Status    Penicillin Sensitive 0.032 mcg/mL E-TEST Final    Cefotaxime Sensitive 0.023 mcg/mL E-TEST Final                   BLOOD CULTURE [[id number]] Collected: 02/06/21 2043    Order Status: Completed Specimen: Blood from Peripheral Updated: 02/07/21 0558     Significant Indicator NEG     Source BLD     Site PERIPHERAL     Culture Result No Growth  Note: Blood cultures are incubated for 5 days and  are monitored continuously.Positive blood cultures  are called to the RN and reported as soon as  they are identified.      Narrative:      Collected By:[id number] PRINCE CAS SKY  Per Hospital Policy: Only change Specimen Src: to \"Line\" if  specified by physician order.  Left Forearm/Arm    BLOOD CULTURE [[id number]] Collected: 02/06/21 2057    Order Status: Completed Specimen: Blood from Peripheral Updated: 02/07/21 0558     Significant Indicator NEG     Source BLD     Site PERIPHERAL     Culture Result No Growth  Note: Blood cultures are incubated for 5 days and  are monitored continuously.Positive blood cultures  are called to the RN and reported as soon as  they are identified.      Narrative:      Collected By:[id number] PRINCE CAS SKY  Per Hospital Policy: Only change Specimen Src: to \"Line\" if  specified by physician order.  Right Forearm/Arm    E-Test [[id number]] Collected: 02/04/21 1630    Order Status: Completed Specimen: Other Updated: 02/06/21 1045     ETEST Sensitivity INTERIM    Narrative:      19 tel. [phone] 02/06/2021, 10:44, RB PERF. RESULTS CALLED TO:  [id number] RN  Per Hospital Policy: Only change Specimen Src: to \"Line\" if  specified by physician order.    MRSA By PCR (Amp) [[id number]] Collected: 02/05/21 0930    Order Status: Completed Specimen: Respirate from Nares Updated: 02/05/21 1758     Significant Indicator NEG     Source RESP     Site NARES     MRSA PCR Negative for MRSA by PCR.    Narrative:      Collected " "By:[id number] TERRY THOAMS  Collected By:[id number] TERRY BRITTA THOMAS    Urinalysis [[id number]]  (Abnormal) Collected: 02/04/21 2148    Order Status: Completed Specimen: Urine, Fragoso Cath Updated: 02/04/21 2229     Color DK Yellow     Character Turbid     Specific Gravity 1.033     Ph 5.0     Glucose Negative mg/dL      Ketones Negative mg/dL      Protein 100 mg/dL      Bilirubin Moderate     Urobilinogen, Urine 0.2     Nitrite Negative     Leukocyte Esterase Trace     Occult Blood Large     Micro Urine Req Microscopic    Narrative:      If not done within the last 24 hours    Blood Culture [[id number]]     Order Status: Canceled Specimen: Blood from Peripheral     COV-2, FLU A/B, AND RSV BY PCR (2-4 HOURS CEPHEID): Collect NP swab in [place] [[id number]] Collected: 02/04/21 1710    Order Status: Completed Specimen: Respirate Updated: 02/04/21 1814     Influenza virus A RNA Negative     Influenza virus B, PCR Negative     RSV, PCR Negative     SARS-CoV-2 by PCR NotDetected     Comment: PATIENTS: Important information regarding your results and instructions can  be found at [email or web address]   \"After your  Covid-19 Test\"  [facility] providers: PLEASE REFER TO DE-ESCALATION AND RETESTING PROTOCOL  on [email or web address]  **The [employer] GeneXpert Xpress SARS-CoV-2 Test has been made available for  use under the Emergency Use Authorization (EUA) only.          SARS-CoV-2 Source NP Swab    Narrative:      Indication for culture:->Evaluation for sepsis without a  clear source of infection  Have you been in close contact with a person who is suspected  or known to be positive for COVID-19 within the last 30 days  (e.g. last seen that person < 30 days ago)->No    BLOOD CULTURE [[id number]]     Order Status: Canceled Specimen: Blood from Peripheral           Assessment:  Active Hospital Problems    Diagnosis    • *Cellulitis of right lower extremity [L03.115]    • Fluid overload secondary to IV fluids  [E87.70]    • " Atrial fibrillation with RVR ([facility]) [I48.91]    • Sepsis ([facility]) [A41.9]    • COPD (chronic obstructive pulmonary disease) ([facility]) [J44.9]    • Type 2 diabetes mellitus without complication, without long-term current use of insulin ([facility]) [E11.9]    • Lactic acid acidosis [E87.2]    • Acute respiratory failure with hypoxia ([facility]) [J96.01]    • Paroxysmal SVT (supraventricular tachycardia) ([facility]) [I47.1]    • Leukocytosis with bandemia [D72.825]      Interval 24 hours:      AF, O2 RA  Labs reviewed  Imaging personally reviewed both images and report.   Studies reviewed  Micro reviewed    Patient with ongoing uncontrolled watery bowel movements.  Still with significant edema, erythema warmth and drainage from right leg.  New areas of superficial appearing necrosis on front of shin and some blistering around ankle.  CT with superficial fasciitis, no gas in tissues and no drainable abscess.  She continued on ceftriaxone and Flagyl for now.  C. difficile testing ordered.      IMPRESSION:   Septic shock, possible toxic shock syndrome, resolved       Group A streptococcus bacteremia.  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 obese man with a history of a bioprosthetic mitral valve replacement in March 2017, type 2 diabetes mellitus, pacemaker placement in July 2014, coronary artery disease and COPD admitted after being found down.  Patient admitted for septic shock secondary to severe right lower extremity cellulitis with group A streptococcal bacteremia.  Blood culture on 2/4 (1 out of 2 sets) + group A streptococcus.  Repeat blood cultures on 2/6 remain negative to date.  KD without any evidence  of prosthetic valve or native valve endocarditis.     --Continue ceftriaxone and Flagyl given the increase in WBC and ongoing significant cellulitis without improvement    --- Antticipate a 14-day course of antibiotics from 2/6.  Stop date 2/20/2021  --- C diff testing ordered  --- Repeat blood cultures ordered by primary team    --- Monitor closely and if decompensates will transition antibiotics to Zosyn and linezolid     Discussed with nurse. ID will continue to follow.

## 2021-02-11 NOTE — CARE PLAN
Problem: Pain Management  Goal: Pain level will decrease to patient's comfort goal  Outcome: PROGRESSING AS EXPECTED     Problem: Safety - Medical Restraint  Goal: Remains free of injury from restraints (Restraint for Interference with Medical Device)  Description: INTERVENTIONS:  1. Determine that other, less restrictive measures have been tried or would not be effective before applying the restraint  2. Evaluate the patient's condition at the time of restraint application  3. Inform patient/family regarding the reason for restraint  4. Q2H: Monitor safety, psychosocial status, comfort, nutrition and hydration  Outcome: MET

## 2021-02-11 NOTE — PROGRESS NOTES
Assumed care of pt. Bedside report received from Brooke Caruso RN. Pt was updated on plan of care. Call light, phone and personal belongings in reach. Bed alarm on and working properly, bed in lowest position, and locked. Pt is complaining of 7 out of 10 pain, medication given per MAR

## 2021-02-11 NOTE — PROGRESS NOTES
Inpatient Anticoagulation Service Note    Date: 2/11/2021    Reason for Anticoagulation: Atrial Fibrillation, Deep Vein Thrombosis, Mechanical Mitral Valve Replacement  , Aortic Mechanical Valve Replacement     Target INR: 2.5 to 3.5         Hemoglobin Value: (!) 9.6  Hematocrit Value: (!) 29.5  Lab Platelet Value: 295    INR from last 7 days     Date/Time INR Value    02/11/21 0408  (!) 3.1    02/10/21 0242  (!) 5.7    02/09/21 0218  (!) 4.47    02/08/21 0250  (!) 3.04    02/07/21 0827  (!) 2.74    02/04/21 1615  (!) 1.64        Dose from last 7 days     Date/Time Dose (mg)    02/11/21 1341  1.5    02/10/21 1415  0    02/09/21 1347  0    02/08/21 1404  2    02/07/21 1606  2.5        Average Dose (mg): (Home Dose: 2 mg Th + 4 mg We + 5 mg ROW per chart review)  Significant Interactions: Amiodarone, Antibiotics, Flagyl  Bridge Therapy: No  Days of Overlap Therapy: 1   INR Value Greater than 2 Prior to Discontinuation of Parenteral Anticoagulation: Yes    Reversal Agent Administered: Not Applicable    Comments: INR down to 3.1 from 5,7 after 2 days of warfarin dosing being held. Will resume warfarin dosing with a warfarin 1.5 mg dose today and check the INR tomorrow. Pt likely to need a lower warfarin dose than his home dosing. Of note, the pt's antibiotics have been changed to ceftriaxone and Flagyl which have DDI with warfarin, especially the Flagyl.     Education Material Provided?: No(chronic warfarin patient)  Pharmacist suggested discharge dosing: TBD, likely warfarin 3 mg daily, but may need to be lower while on antibitocs.     Wilfredo Hathaway, PharmD

## 2021-02-11 NOTE — PROGRESS NOTES
Daily Progress Note:     Date of Service: 2/10/2021  Primary Team: [facility] Team   Attending: Dr. Shah  Senior Resident: Dr. Ojeda  Intern: Dr. Motley  Contact:  [phone]    Chief Complaint: Fall  Subjective:  Patient reports that he has more left leg pain today than right leg pain.  Otherwise resting comfortably in bed, with head of bed down.  No other complaints    Review of Systems:    Review of Systems   Respiratory: Positive for cough. Negative for hemoptysis and sputum production.    Gastrointestinal: Positive for nausea and vomiting.   Musculoskeletal:        Right leg pain not out of proportion   All other systems reviewed and are negative.    Objective Data:   Vitals:   Temp:  [36.2 °C (97.2 °F)-37.1 °C (98.8 °F)] 36.8 °C (98.2 °F)  Pulse:  [75-84] 83  Resp:  [14-19] 19  BP: (101-118)/(65-76) 101/69  SpO2:  [93 %-97 %] 93 %   93-95 % on room air    Physical Exam:   Physical Exam  Constitutional:       General: He is not in acute distress.     Appearance: He is normal weight.   HENT:      Head: Normocephalic.      Nose: Nose normal.      Mouth/Throat:      Mouth: Mucous membranes are moist.   Eyes:      Extraocular Movements: Extraocular movements intact.      Pupils: Pupils are equal, round, and reactive to light.   Cardiovascular:      Rate and Rhythm: Normal rate and regular rhythm.      Pulses: Normal pulses.   Pulmonary:      Effort: Pulmonary effort is normal. No respiratory distress.      Breath sounds: Wheezing and rales (Minimal) present.      Comments: Coarse.  Diminished.  Abdominal:      General: Abdomen is flat. Bowel sounds are normal. There is no distension.      Tenderness: There is no abdominal tenderness. There is no guarding.   Musculoskeletal:         General: Swelling and tenderness present. No signs of injury.      Right lower leg: Edema (black patches surrounded by erythema.  Tenderness not out of proportion.  Not expanding) present.      Left lower leg: Edema present.       Comments: Fluid overloaded on exam.  Pitting edema to bilateral hips.  Pitting edema to hips bilaterally   Skin:     General: Skin is warm and dry.      Capillary Refill: Capillary refill takes less than 2 seconds.      Coloration: Skin is not jaundiced.      Findings: No bruising.   Neurological:      General: No focal deficit present.      Mental Status: He is alert.      Cranial Nerves: No cranial nerve deficit.      Motor: No weakness.   Psychiatric:         Mood and Affect: Mood normal.         Behavior: Behavior normal.         Thought Content: Thought content normal.     Labs:  WBC 16.1  Hgb 10.1    Creatinine 1.06  Sodium 136  K4.2  Phosphorus 2.2  Mag 2.1  INR 5  Labs unremarkable    Micro:  Blood cultures positive on 2/4 with GAS  Blood cultures negative on 2/6    EKG:  None    Imaging:   CT right leg: Shows extensive edema, cellulitis, superficial fasciitis.  No soft tissue gas.  No osteomyelitis.    Meds:  Current Outpatient Medications   Medication Instructions   • acetaminophen (TYLENOL) 1,000 mg, Oral, EVERY 6 HOURS PRN   • allopurinol (ZYLOPRIM) 100 mg, Oral, EVERY MORNING   • benzonatate (TESSALON) 100 mg, Oral, 3 TIMES DAILY PRN   • cetirizine (ZYRTEC) 10 mg, Oral, EVERY MORNING   • colchicine (COLCRYS) 0.6 mg, Oral, 2 TIMES DAILY   • furosemide (LASIX) 20 mg, Oral, DAILY   • hydrOXYzine HCl (ATARAX) 25 mg, Oral, 3 TIMES DAILY PRN   • lisinopril (PRINIVIL) 2.5 mg, Oral, EVERY EVENING   • metFORMIN (GLUCOPHAGE) 500 mg, Oral, 2 TIMES DAILY WITH MEALS   • metoprolol SR (TOPROL XL) 50 mg, Oral, EVERY MORNING   • tamsulosin (FLOMAX) 0.4 MG capsule TAKE 1 CAPSULE BY MOUTH EVERY DAY   • warfarin (COUMADIN) 2 mg, Oral, SEE ADMIN INSTRUCTIONS, Takes 2 mg every Friday has 5 mg tablets to take all other days   • warfarin (COUMADIN) 5 mg, Oral, SEE ADMIN INSTRUCTIONS, Takes Monday, Tuesday, Wednesday, Thursday, Saturday, and Sunday, has 2 mg tablets to take on Fridays      Scheduled Medications    Medication Dose Frequency   • cefTRIAXone (ROCEPHIN) IV  2 g Q24HRS   • metroNIDAZOLE  500 mg Q8HRS   • [START ON 2/11/2021] lisinopril  2.5 mg Q DAY   • [START ON 2/11/2021] furosemide  40 mg BID DIURETIC   • allopurinol  100 mg QAM   • phosphorus  250 mg TID   • insulin lispro  0-10 Units TID AC   • metoprolol SR  50 mg Q DAY   • polyethylene glycol/lytes  1 Packet DAILY    And   • senna-docusate  2 tablet BID   • acetaminophen  650 mg TID   • zinc sulfate  220 mg DAILY   • multivitamin  1 tablet DAILY   • [Held by provider] MD Alert...Warfarin per Pharmacy   PHARMACY TO DOSE   • [START ON 2/20/2021] amiodarone  200 mg DAILY   • amiodarone  400 mg TWICE DAILY      Consultants/Specialty:  Wound  PT  OT  Cardiology  Infectious disease    Problem Representation:   Morales Olivier is a 65-year-old male, admitted on Feb 4th for 3/4 SIRS criteria, concern for severe sepsis secondary to RLE cellulitis.  Admitted for severe sepsis versus septic shock.  Patient intubated in the ER after failing BiPAP.  Despite history of sick sinus syndrome status post pacemaker, patient found to be in SVT which later progressed to A. fib with RVR, on anticoagulation with warfarin.  Cardiology started amiodarone. The patient was successfully extubated on 2/8.  Now being treated for group A cellulitis with IV antibiotics.  On 2/10 ID broadened ABX to ceftriaxone plus Flagyl till 2/20.    * Cellulitis of right lower extremity- (present on admission)  Assessment & Plan  Blood cultures 2/4 grew GAS  ID following.  Recommends 14 days of antibiotics from 2/6- 2/20 with ceftriaxone and Flagyl, as blood cell count has remained high despite adequate IV antibiotics.  Patient will need a midline on discharge  CT leg ordered to evaluate for muscle involvement of infection  Blood cultures, pro-Stephen, lactic acid ordered    Fluid overload secondary to IV fluids   Assessment & Plan  Likely secondary to IV fluids given per sepsis protocol  Patient now  presents with pitting edema to hips  Patient does not look septic  WBC improved since admission   No signs of sepsis. Without tachycardia or tachypnea or fever  Blood pressure stable  Continue Lasix 40 IV twice daily  Continuous ins and outs  Daily weights  Elevate legs    Atrial fibrillation with RVR ([facility])- (present on admission)  Assessment & Plan  On telemetry, ventricularly paced rhythm   Amiodarone therapy started by cardiology.  Anticoagulation with warfarin.  Patient has pacemaker for sick sinus syndrome  As patient is on amiodarone, which can interfere with warfarin  Continue metoprolol    Sepsis ([facility])- (present on admission)  Assessment & Plan  Resolving  Severe Sepsis Present on admission, with respiratory failure and EBEN  Patient met 3/4 SIRS criteria on admission  Admission WBC 28.8 -> now 13.3  Source of infection is left lower extremity cellulitis  Sepsis protocol initiated.  Patient received IV fluids and antibiotics  Patient no longer shows signs of endorgan damage  Echo and KD normal, no vegetation  Continue penicillin G for group A strep bacteremia    COPD (chronic obstructive pulmonary disease) ([facility])- (present on admission)  Assessment & Plan  Some wheezing noted  No crackles on PE  18-pack-year history  Patient now on ipratropium nebulizer    Type 2 diabetes mellitus without complication, without long-term current use of insulin ([facility])- (present on admission)  Assessment & Plan  Recent A1c 7.6  Glucose 140s to 150s  Patient is not on home insulin  SSI lispro  Likely will not need insulin therapy at home    Leukocytosis with bandemia  Assessment & Plan  -See cellulitis plan    Lactic acid acidosis- (present on admission)  Assessment & Plan  Resolved  Secondary to tissue ischemia in setting of severe sepsis.    Paroxysmal SVT (supraventricular tachycardia) ([facility])- (present on admission)  Assessment & Plan  Initially noted to be in supraventricular tachycardia changing to A. fib with RVR.    Acute  respiratory failure with hypoxia ([facility])- (present on admission)  Assessment & Plan  Resolved  Likely combination of sepsis, A. fib RVR  Now saturating 93 to 95% on room air  Incentive spirometer    Code status: Full code  Diet: Cardiac diabetic  Lines/ tubes/ drains: Condom cath  IV fluids: None  Abx (dates): Penicillin G  GI ppx: MiraLAX  DVT ppx: Warfarin  SNF referral placed

## 2021-02-11 NOTE — CARE PLAN
Problem: Bowel/Gastric:  Goal: Normal bowel function is maintained or improved  Outcome: PROGRESSING AS EXPECTED     Problem: Skin Integrity  Goal: Risk for impaired skin integrity will decrease  Outcome: PROGRESSING AS EXPECTED     Problem: Mobility  Goal: Risk for activity intolerance will decrease  Outcome: PROGRESSING SLOWER THAN EXPECTED

## 2021-02-12 NOTE — PROGRESS NOTES
Daily Progress Note:     Date of Service: 2/12/2021  Primary Team: [facility] Team   Attending: Dr. Ponce  Senior Resident: Dr. Clifford  Contact:  [phone]    Chief Complaint: Fall    Subjective:  Mr. Olivier states that he has had nausea (dating to the beginning of his hospital stay) , persistent pain in spite of oxy (which, upon MAR review he hasn't been needing), appeared comfortable and resting; denies f/v             Review of Systems:    Review of Systems   Constitutional: Negative for chills and fever.   HENT: Negative for nosebleeds.    Eyes: Negative for discharge.   Respiratory: Negative for hemoptysis, sputum production and stridor.    Cardiovascular: Positive for leg swelling. Negative for chest pain and palpitations.   Gastrointestinal: Positive for diarrhea. Negative for blood in stool.   Genitourinary: Negative for dysuria and frequency.   Musculoskeletal: Positive for joint pain.   Skin: Negative for itching.   Neurological: Negative for dizziness and headaches.   Endo/Heme/Allergies: Does not bruise/bleed easily.   Psychiatric/Behavioral: Negative for depression. The patient is not nervous/anxious.    All other systems reviewed and are negative.    Objective Data:   Vitals:   Temp:  [36.9 °C (98.5 °F)-37.2 °C (99 °F)] 37 °C (98.6 °F)  Pulse:  [76-94] 94  Resp:  [17-18] 17  BP: (114-135)/(72-92) 116/78  SpO2:  [92 %-94 %] 94 %   93-95 % on room air    Physical Exam:   Physical Exam  Constitutional:       General: He is not in acute distress.  HENT:      Head: Normocephalic and atraumatic.      Right Ear: External ear normal.      Left Ear: External ear normal.      Nose: Nose normal.      Mouth/Throat:      Mouth: Mucous membranes are moist.   Eyes:      Extraocular Movements: Extraocular movements intact.      Pupils: Pupils are equal, round, and reactive to light.   Cardiovascular:      Rate and Rhythm: Normal rate and regular rhythm.      Pulses: Normal pulses.   Pulmonary:      Effort:  Pulmonary effort is normal. No respiratory distress.      Breath sounds: Rales (Minimal) present. No wheezing.      Comments: Coarse.  Diminished.  Abdominal:      General: Abdomen is flat. Bowel sounds are normal. There is no distension.      Tenderness: There is no abdominal tenderness. There is no guarding.   Musculoskeletal:         General: Swelling and tenderness (On left knee and right elbow as well as site of cellulitis. ) present. No signs of injury.      Cervical back: Normal range of motion and neck supple.      Right lower leg: Edema present.      Left lower leg: Edema present.      Comments: Pitting edema to bilateral hips   Skin:     General: Skin is warm and dry.      Capillary Refill: Capillary refill takes less than 2 seconds.      Coloration: Skin is not jaundiced.      Findings: No bruising.      Comments:  Bullae, dark discoloration like with persisting erythema, sloughing of skin on right lower extremity, worsening Erythema not extending.   Neurological:      General: No focal deficit present.      Mental Status: He is alert.      Cranial Nerves: No cranial nerve deficit.      Motor: No weakness.   Psychiatric:         Mood and Affect: Mood normal.         Behavior: Behavior normal.         Thought Content: Thought content normal.     Labs:  Recent Labs     02/10/21  1248 02/11/21  0408 02/12/21  0754   WBC 15.1* 16.5* 15.3*   RBC 3.03* 3.04* 3.18*   HEMOGLOBIN 9.6* 9.6* 10.2*   HEMATOCRIT 29.7* 29.5* 30.4*   MCV 98.0* 97.0 95.6   MCH 31.7 31.6 32.1   RDW 62.5* 60.9* 59.4*   PLATELETCT 230 295 242   MPV 10.4 10.7 10.8   NEUTSPOLYS 87.00* 82.60* 77.90*   LYMPHOCYTES 2.60* 5.20* 6.20*   MONOCYTES 3.50 2.60 1.80   EOSINOPHILS 0.90 1.70 6.20   BASOPHILS 2.60* 0.00 0.00   RBCMORPHOLO Present Present Present     Recent Labs     02/10/21  0242 02/11/21  0408 02/12/21  0754   SODIUM 136 132* 132*   POTASSIUM 4.2 3.7 3.6   CHLORIDE 100 98 97   CO2 28 26 24   GLUCOSE 118* 114* 128*   BUN 20 24* 22    CPKTOTAL 828* 612* 482*     Recent Labs     02/10/21  0242 02/11/21  0408 02/12/21  0754   ALBUMIN 2.8* 2.8* 2.6*   TBILIRUBIN 0.8 0.7 0.6   ALKPHOSPHAT 73 76 78   TOTPROTEIN 6.0 5.8* 5.9*   ALTSGPT 17 22 22   ASTSGOT 35 33 34   CREATININE 1.06 1.05 1.15       Micro:  Blood cultures positive on 2/4 with GAS  Blood cultures negative on 2/6  Blood cultures from 02/10 negative.     EKG:  None    Imaging:   CT right leg: Shows extensive edema, cellulitis, superficial fasciitis.  No soft tissue gas.  No osteomyelitis.    Meds:  Current Outpatient Medications   Medication Instructions   • acetaminophen (TYLENOL) 1,000 mg, Oral, EVERY 6 HOURS PRN   • allopurinol (ZYLOPRIM) 100 mg, Oral, EVERY MORNING   • benzonatate (TESSALON) 100 mg, Oral, 3 TIMES DAILY PRN   • cetirizine (ZYRTEC) 10 mg, Oral, EVERY MORNING   • colchicine (COLCRYS) 0.6 mg, Oral, 2 TIMES DAILY   • furosemide (LASIX) 20 mg, Oral, DAILY   • hydrOXYzine HCl (ATARAX) 25 mg, Oral, 3 TIMES DAILY PRN   • lisinopril (PRINIVIL) 2.5 mg, Oral, EVERY EVENING   • metFORMIN (GLUCOPHAGE) 500 mg, Oral, 2 TIMES DAILY WITH MEALS   • metoprolol SR (TOPROL XL) 50 mg, Oral, EVERY MORNING   • tamsulosin (FLOMAX) 0.4 MG capsule TAKE 1 CAPSULE BY MOUTH EVERY DAY   • warfarin (COUMADIN) 2 mg, Oral, SEE ADMIN INSTRUCTIONS, Takes 2 mg every Friday has 5 mg tablets to take all other days   • warfarin (COUMADIN) 5 mg, Oral, SEE ADMIN INSTRUCTIONS, Takes Monday, Tuesday, Wednesday, Thursday, Saturday, and Sunday, has 2 mg tablets to take on Fridays      Scheduled Medications   Medication Dose Frequency   • enoxaparin (LOVENOX) injection  100 mg Q12HRS   • cefTRIAXone (ROCEPHIN) IV  2 g Q24HRS   • metroNIDAZOLE  500 mg Q8HRS   • lisinopril  2.5 mg Q DAY   • furosemide  40 mg BID DIURETIC   • allopurinol  100 mg QAM   • insulin lispro  0-10 Units TID AC   • metoprolol SR  50 mg Q DAY   • acetaminophen  650 mg TID   • zinc sulfate  220 mg DAILY   • multivitamin  1 tablet DAILY   •  [START ON 2/20/2021] amiodarone  200 mg DAILY   • amiodarone  400 mg TWICE DAILY      Consultants/Specialty:  Wound  PT  OT  Cardiology  Infectious disease    Problem Representation:   Morales Olivier is a 65-year-old male, admitted on Feb 4th for 3/4 SIRS criteria, concern for severe sepsis secondary to RLE cellulitis.  Admitted for severe sepsis versus septic shock.  Patient intubated in the ER after failing BiPAP.  Despite history of sick sinus syndrome status post pacemaker, patient found to be in SVT which later progressed to A. fib with RVR, on anticoagulation with warfarin.  Cardiology started amiodarone. The patient was successfully extubated on 2/8.  Now being treated for group A cellulitis with IV antibiotics.  On 2/10 ID broadened ABX to ceftriaxone plus Flagyl till 2/20, with repeat Ct bgative for abscess. CPK downtrending 612 2/12 (vs 828 prior)    * Cellulitis of right lower extremity- (present on admission)  Assessment & Plan  Blood cultures 2/4 grew GAS, with repeat blood cultures negative.   ID following.  Recommends 14 days of antibiotics from 2/6- 2/20 with ceftriaxone and Flagyl, as blood cell count has remained high despite adequate IV antibiotics.  CT leg negative for abscess.   Blood cultures negative so far. Lactate negative.  Pain: oxy 5-10 q 3 prn  Zofran prn for emesis  Procal downtrended to .30  WBC today 15.3 (16.5 yesterday), CTM        Inquire about setting up midline for discharge     Fluid overload secondary to IV fluids   Assessment & Plan  Likely secondary to IV fluids given per sepsis protocol, Echo Ef of 45%, normal right ventricular function  Patient now presents with pitting edema to hips  Patient does not look septic  WBC improved since admission   No signs of sepsis. Without tachycardia or tachypnea or fever  Blood pressure stable  Lasix 40 oral twice daily- titrate as needed.  Continuous ins and outs  Daily weights  Elevate legs  Outpatient repeat echocardiogram recommended by  cardiology whose recommendations are appreciated.       On low dose lisinopril as of 02/11        Following for repeat bcx    Atrial fibrillation with RVR ([facility])- (present on admission)  Assessment & Plan  On telemetry, ventricularly paced rhythm   Amiodarone therapy started by cardiology.  Anticoagulation updated from warfarin to lovenox  Patient has pacemaker for sick sinus syndrome  Continue metoprolol XL- uptitrate as needed.      Sepsis ([facility])- (present on admission)  Assessment & Plan  Resolving  Severe Sepsis Present on admission, with respiratory failure and EBEN  Patient met 3/4 SIRS criteria on admission  Admission WBC 28.8 -> now trended down since admission.  Source of infection is left lower extremity cellulitis  Sepsis protocol initiated.  Patient received IV fluids and antibiotics  Patient no longer shows signs of endorgan damage  Echo and KD normal, no vegetation  Continue ceftriaxone and flagyl. New blood cultures pending.  C diff negative    COPD (chronic obstructive pulmonary disease) ([facility])- (present on admission)  Assessment & Plan  Not in exacerbation  18-pack-year history  Patient now on ipratropium nebulizer    Type 2 diabetes mellitus without complication, without long-term current use of insulin ([facility])- (present on admission)  Assessment & Plan  Recent A1c 7.6  Patient is not on home insulin  SSI lispro  Likely will not need insulin therapy at home    Leukocytosis with bandemia  Assessment & Plan  -See cellulitis plan    Lactic acid acidosis- (present on admission)  Assessment & Plan  Resolved  Secondary to tissue ischemia in setting of severe sepsis.    Paroxysmal SVT (supraventricular tachycardia) ([facility])- (present on admission)  Assessment & Plan  Initially noted to be in supraventricular tachycardia changing to A. fib with RVR.  Refer to afib plan.    Acute respiratory failure with hypoxia ([facility])- (present on admission)  Assessment & Plan  Resolved  Likely combination of sepsis, A. fib  RVR  Now saturating 93 to 95% on room air  Incentive spirometer    Gout Flare  -Started on colchicine and home allopurinol.   -Unlikely septic arthritis as characteristics similar to gout attacks he has had in the past and blood counts are improving.     Code status: Full code  Diet: Cardiac diabetic  Lines/ tubes/ drains: Condom cath  IV fluids: None  Abx (dates): Ceftriaxone and Flagyl  GI ppx: MiraLAX stopped.  DVT ppx: Warfarin  SNF referral placed

## 2021-02-12 NOTE — PROGRESS NOTES
Assumed care of pt. Bedside report received from night OSWALD Vasquez. Pt was updated on plan of care. Call light, phone and personal belongings within reach. Bed locked in lowest position, 2 side rails up, bed alarm on and working appropriately.

## 2021-02-12 NOTE — THERAPY
"Physical Therapy   Daily Treatment     Patient Name: Morales Olivier  Age:  65 y.o., Sex:  male  Medical Record #: [id number]  Today's Date: 2/11/2021     Precautions: Fall Risk    Assessment    Pt progressing well w/ therapy. Pt does require a lot of cues to sequence mobility and is delayed in cue following. Pt was able to assist more w/ mobility. One EOB, he is able to hold balance and perform dynamic tasks. Pt able to perform 2 STS w/ Jen for liftoff but was limited by pain w/ weight bearing. Pt able to tolerate seated scoots to work on increasing tolerance to weight bearing w/out full body weight.    Plan    Continue current treatment plan.    DC Equipment Recommendations: Unable to determine at this time  Discharge Recommendations: Recommend post-acute placement for additional physical therapy services prior to discharge home      Subjective    \"Man you don't get to rest around here.\"     Objective       02/11/21 0909   Precautions   Precautions Fall Risk   Comments R LE wound w/ weeping   Gait Analysis   Gait Level Of Assist Unable to Participate   Bed Mobility    Supine to Sit Minimal Assist   Sit to Supine Minimal Assist   Scooting Minimal Assist   Rolling Minimum Assist to Lt.   Comments Pt following cues better for log roll but taking a significant amount of time to perform. Assist w/ trunk to get upright and LE's to lay down.   Functional Mobility   Sit to Stand Minimal Assist   Bed, Chair, Wheelchair Transfer Unable to Participate   Mobility Pt able to perform stand w/ Jen for lift off. Poor standing tolerence.   Short Term Goals    Short Term Goal # 1 Pt will perform supine<>sit from flat HOB/no railing with supervision within 6 visits to ensure independnet mobility at home.    Goal Outcome # 1 goal not met   Short Term Goal # 2 Pt will perform sit<>stand with FWW and supervision within 6 visits to ensure independent moblity at home.    Goal Outcome # 2 Goal not met   Short Term Goal # 3 Pt will " ambulate x 150ft with FWW and supervision within 6 visits to ensure independent mobility at home.    Goal Outcome # 3 Goal not met

## 2021-02-12 NOTE — CARE PLAN
Problem: Knowledge Deficit  Goal: Knowledge of disease process/condition, treatment plan, diagnostic tests, and medications will improve  Outcome: PROGRESSING AS EXPECTED  Note: Discussed POC and medications with patient.  Patient verbalized understanding.       Problem: Skin Integrity  Goal: Risk for impaired skin integrity will decrease  Outcome: PROGRESSING AS EXPECTED  Note: Skin assessment completed, Q2 turns in place.  Pillows in use for positions and to float heels, silicone oxygen tubing in use.  Frequent linen changes to ensure patient stays dry.  Hourly rounding in place.

## 2021-02-13 NOTE — PROGRESS NOTES
Infectious Disease Progress Note    Author: Luciana Ingram M.D. Date & Time of service: 2021  9:03 AM    Chief Complaint:  Septic shock, RLE cellulitits. GAS bacteremia     Interval History:   AF, O2 RA 2 L NC, pressors remain off. States the leg still is painful but improving overall.  He is continued on IV penicillin as below.   AF, O2 2 L NC, ongoing edema and drainage of right leg  2/10 AF, O2 RA, complaining of nausea today, no vomiting and has had some diarrhea.  His right leg continues to have edema, erythema and warmth.  Some increase in WBC today. Patient's antibiotics transition from penicillin to ceftriaxone and Flagyl.    AF, O2 RA, watery bowel movements.  Still with significant edema, erythema warmth and drainage from right leg.  New areas of superficial appearing necrosis on front of shin and some blistering around ankle.  CT with superficial fasciitis, no gas in tissues and no drainable abscess.  She continued on ceftriaxone and Flagyl for now.  C. difficile testing ordered.       Review of Systems:  Review of Systems   Constitutional: Positive for malaise/fatigue. Negative for chills and fever.   Respiratory: Negative for cough and shortness of breath.    Cardiovascular: Negative for chest pain.   Gastrointestinal: Negative for abdominal pain, constipation, diarrhea, nausea and vomiting.   Musculoskeletal: Positive for joint pain and myalgias.       Hemodynamics:  Temp (24hrs), Av.6 °C (97.8 °F), Min:36.4 °C (97.5 °F), Max:36.7 °C (98 °F)  Temperature: 36.6 °C (97.8 °F)  Pulse  Av.5  Min: 64  Max: 193   Blood Pressure : 133/80       Physical Exam:  Physical Exam  Constitutional:       Appearance: Normal appearance.   Cardiovascular:      Rate and Rhythm: Normal rate and regular rhythm.      Heart sounds: Normal heart sounds.   Pulmonary:      Effort: Pulmonary effort is normal.      Breath sounds: Normal breath sounds.   Abdominal:      General: Abdomen is flat. Bowel  sounds are normal.      Palpations: Abdomen is soft.   Musculoskeletal:         General: Swelling, tenderness and deformity present. Normal range of motion.      Right lower leg: Edema present.      Comments: Right leg with edema, ongoing skin breakdown and necrotic areas, tender to palpation, weeping and blistering around ankle.  Foot and ankle with edema.  Right arm with edema, significant bruising, warm and tender to touch particularly above elbow.   Skin:     General: Skin is warm.      Findings: Bruising present.      Comments: Right arm with significant ecchymosis   Neurological:      Mental Status: He is alert.   Psychiatric:         Mood and Affect: Mood normal.         Behavior: Behavior normal.         Meds:    Current Facility-Administered Medications:   •  enoxaparin (LOVENOX) injection  •  cefTRIAXone (ROCEPHIN) IV  •  metroNIDAZOLE  •  lisinopril  •  furosemide  •  ipratropium  •  colchicine  •  allopurinol  •  [DISCONTINUED] insulin regular **AND** POC Blood Glucose **AND** NOTIFY MD and PharmD **AND** dextrose 50%  •  insulin lispro  •  metoprolol SR  •  acetaminophen  •  zinc sulfate  •  multivitamin  •  [START ON 2/20/2021] amiodarone  •  amiodarone  •  acetaminophen  •  oxyCODONE immediate-release  •  morphine injection  •  Respiratory Therapy Consult  •  labetalol  •  ondansetron  •  ondansetron    Labs:  Recent Labs     02/11/21 0408 02/12/21  0754 02/13/21  0651   WBC 16.5* 15.3* 12.0*   RBC 3.04* 3.18* 2.97*   HEMOGLOBIN 9.6* 10.2* 9.7*   HEMATOCRIT 29.5* 30.4* 29.1*   MCV 97.0 95.6 98.0*   MCH 31.6 32.1 32.7   RDW 60.9* 59.4* 61.2*   PLATELETCT 295 242 271   MPV 10.7 10.8 10.4   NEUTSPOLYS 82.60* 77.90* 85.20*   LYMPHOCYTES 5.20* 6.20* 6.10*   MONOCYTES 2.60 1.80 2.60   EOSINOPHILS 1.70 6.20 0.00   BASOPHILS 0.00 0.00 0.90   RBCMORPHOLO Present Present Present     Recent Labs     02/11/21 0408 02/12/21  0754 02/13/21  0651   SODIUM 132* 132* 136   POTASSIUM 3.7 3.6 3.7   CHLORIDE 98 97 100     CO2 26 24 26   GLUCOSE 114* 128* 130*   BUN 24* 22 21   CPKTOTAL 612* 482*  --      Recent Labs     02/11/21  0408 02/12/21  0754 02/13/21  0651   ALBUMIN 2.8* 2.6* 2.7*   TBILIRUBIN 0.7 0.6 0.8   ALKPHOSPHAT 76 78 70   TOTPROTEIN 5.8* 5.9* 5.7*   ALTSGPT 22 22 18   ASTSGOT 33 34 27   CREATININE 1.05 1.15 1.12       Imaging:  CT-EXTREMITY, LOWER WITH RIGHT    Result Date: 2/10/2021  2/10/2021 4:21 PM HISTORY/REASON FOR EXAM:  Soft tissue infection suspected, femur, initial exam. TECHNIQUE/EXAM DESCRIPTION AND NUMBER OF VIEWS:  CT scan of the RIGHT lower extremity with contrast, with reconstructions. Thin helical 3 mm sections were obtained from the distal femur through the proximal tibia/fibula. Sagittal and coronal multiplanar reconstructions were generated from the axial images. A total of 100 mL of Omnipaque 350 nonionic contrast was administered  IV without complication. Up to date radiation dose reduction adjustments have been utilized to meet ALARA standards for radiation dose reduction. COMPARISON: 12/20/2019 FINDINGS: There is relatively diffuse right lower extremity skin thickening, subcutaneous fat infiltration which could be related to edema and/or cellulitis and superficial fasciitis. There is no soft tissue gas suggests a necrotizing infection. No focal fluid collection. Mildly prominent right groin lymph node is probably reactive. Please note that the foot is incompletely visualized on this study. There are degenerative changes seen within the mid and hindfoot. Degenerative changes of the knee. There is no CT evidence for osteomyelitis. Small bilateral fat-containing inguinal hernias. There is diffuse atherosclerosis.     Extensive right lower extremity edema and/or cellulitis and superficial fasciitis. No soft tissue gas. No evidence of drainable abscess. No CT evidence of osteomyelitis. Atherosclerosis.    DX-CHEST-LIMITED (1 VIEW)    Result Date: 2/5/2021 2/5/2021 5:29 PM HISTORY/REASON FOR EXAM:   CL placement TECHNIQUE/EXAM DESCRIPTION AND NUMBER OF VIEWS: Single AP view of the chest. COMPARISON: 2/5/2021 FINDINGS: Right central line projects over the SVC. Enteric tube passes below the level of the diaphragm. Endotracheal tube projects at the level of the aortic arch. There is right-sided pacer. Sternotomy wires are seen. The cardiomediastinal silhouette is stable.  There is blunting of the right costophrenic angle. There is hazy right basilar opacity which is unchanged. No pneumothorax is identified.     Right central line projects over the SVC. No pneumothorax. Small right pleural effusion with hazy right basilar opacity which may represent atelectasis or pneumonitis. Stable prominence of the cardiomediastinal silhouette.     DX-CHEST-PORTABLE (1 VIEW)    Result Date: 2/8/2021 2/7/2021 11:30 PM HISTORY/REASON FOR EXAM:  For indication of respiratory failure; For indication of respiratory failure TECHNIQUE/EXAM DESCRIPTION AND NUMBER OF VIEWS: Single portable view of the chest. COMPARISON: Study from earlier the same date at 6:24 AM FINDINGS: Hardware is stably positioned in its visualized extent. HEART: Stable size. LUNGS: Lung volumes are low. There are perihilar opacities. There are bibasilar opacities. PLEURA: Neither costophrenic sulcus is well seen     1.  Increased BILATERAL atelectasis which could obscure an additional process such as pulmonary edema or pneumonia 2.  Possible BILATERAL pleural effusions    DX-CHEST-PORTABLE (1 VIEW)    Result Date: 2/7/2021 2/7/2021 6:12 AM HISTORY/REASON FOR EXAM:  For indication of respiratory failure; For indication of respiratory failure TECHNIQUE/EXAM DESCRIPTION AND NUMBER OF VIEWS: Single portable view of the chest. COMPARISON: Yesterday FINDINGS: The cardiomediastinal silhouettes are stable. There are ongoing bibasilar opacities, most likely atelectasis, worse on the right, as well as a small right pleural effusion. Endotracheal tube is no longer present.  Right central venous line is unchanged in  position.     Stable bibasilar opacities, right greater than left.    DX-CHEST-PORTABLE (1 VIEW)    Result Date: 2/6/2021 2/6/2021 5:18 AM HISTORY/REASON FOR EXAM:  For indication of respiratory failure; For indication of respiratory failure TECHNIQUE/EXAM DESCRIPTION AND NUMBER OF VIEWS: Single portable view of the chest. COMPARISON: Yesterday FINDINGS: The cardiomediastinal silhouettes are stable. There is ongoing bibasilar atelectasis, mainly on the right, but with a small right pleural effusion. Support devices are positionally unchanged.     Stable chest findings compared to 2/5.    DX-CHEST-PORTABLE (1 VIEW)    Result Date: 2/5/2021 2/5/2021 5:22 AM HISTORY/REASON FOR EXAM:  For indication of respiratory failure; For indication of respiratory failure TECHNIQUE/EXAM DESCRIPTION AND NUMBER OF VIEWS: Single portable view of the chest. COMPARISON: Yesterday FINDINGS: Cardiac size is unchanged. Interstitial opacities persist in the mid and lower lung fields bilaterally, along with right basilar atelectasis and probable small right pleural effusion. Endotracheal tube is positionally unchanged.     Ongoing findings consistent with mild pulmonary edema, with right basilar atelectasis and small right pleural effusion. No interval change.    DX-CHEST-PORTABLE (1 VIEW)    Result Date: 2/4/2021 2/4/2021 7:55 PM HISTORY/REASON FOR EXAM:  Endotracheal tube placement TECHNIQUE/EXAM DESCRIPTION AND NUMBER OF VIEWS: Single portable view of the chest. COMPARISON: Prior image today at 1643 hours FINDINGS: An endotracheal tube is present, the tip of which is 2 to 3 cm above the corina. Overall cardiopulmonary appearance is unchanged.     Endotracheal tube in place as noted above. Stable cardiopulmonary appearance.    DX-CHEST-PORTABLE (1 VIEW)    Result Date: 2/4/2021 2/4/2021 4:38 PM HISTORY/REASON FOR EXAM:  possible sepsis.. Shortness of breath TECHNIQUE/EXAM DESCRIPTION AND  NUMBER OF VIEWS: Single portable view of the chest. COMPARISON: 2020 FINDINGS: Patient is status post cardiac surgery. Single-lead right-sided pacemaker present. External pacer pads are present. There is right pleural fluid. There is vascular congestion. No pneumothorax.     Enlarged cardiac silhouette status post cardiac surgery with small right-sided pleural effusion and vascular congestion.    US-EXTREMITY VENOUS LOWER BILAT    Result Date: 2021   Vascular Laboratory  CONCLUSIONS  No superficial or deep venous thrombosis.  AILYN MCINTOSH  Exam Date:     2021 09:32  Room #:     Inpatient  Priority:     Stat  Ht (in):             Wt (lb):  Ordering Physician:        JO ANN GARCIA  Referring Physician:       [id number] Diamond  Sonographer:               Gladys Clark RVT  Study Type:                Complete Bilateral  Technical Quality:         Adequate  Age:    65    Gender:     M  MRN:    [id number]  :    1955      BSA:  Indications:     Edema, unspecified, Localized swelling, mass and lump,                   unspecified lower limb  CPT Codes:       59204  ICD Codes:       R60.9  R22.40  History:         Bilateral lower extremity swelling/edema. Prior study done                   12/15/20.  Limitations:  PROCEDURES:  Bilateral lower extremity venous duplex imaging.  The following venous structures were evaluated: common femoral, profunda  femoral, greater saphenous, femoral, popliteal , peroneal and posterior  tibial veins.  Serial compression, augmentation maneuvers,  color and spectral Doppler  flow evaluations were performed.  FINDINGS:  Bilateral lower extremities -  No superficial or deep venous thrombosis.  Complete color filling and compressibility with normal venous flow dynamics  including spontaneous flow, response to augmentation maneuvers, and  respiratory phasicity.  The peroneal and posterior tibial veins are difficult to assess for  compressibility, but flow  response to augmentation is demonstrated.  Alo Mohamud MD  (Electronically Signed)  Final Date:      2021                   09:54    EC-ECHOCARDIOGRAM COMPLETE W/O CONT    Result Date: 2021  Transthoracic Echo Report Echocardiography Laboratory CONCLUSIONS Mildly depressed left ventricular function.  Left ventricular ejection fraction is visually estimated to be 45%, likely affected by rapid heart rate. Endocardial borders not well visualized to accurate assess wall motion abnormalities. Recommend repeating limited study with contrast. The right ventricle was grossly normal in size and function. Severely dilated left atrium. Known mitral valve bioprosthesis which is functioning normally, transvalvular gradient is 6.5 mmHg @. Moderate tricuspid regurgitation. Pleural effusion noted. Compared to the study done on 2020, LVEF is measured lower, likely affected by rapid heart rate. Otherwise, no significant changes. AILYN MCINTOSH Exam Date:         2021                    20:52 Exam Location:     Inpatient Priority:          Routine Ordering Physician:        FIORDALIZA FERGUSON                             ([id number]) Referring Physician:       MARTY Theodore Sonographer:               Jasbir [facility] Age:    65     Gender:    M MRN:    [id number] :    1955 BSA:    2.29   Ht (in):    70     Wt (lb):    250 Exam Type:     Complete Indications:     Persistent atrial fibrillation ICD Codes:       I48.1 CPT Codes:       73629 BP:   94     /   66     HR: Technical Quality:       Poor MEASUREMENTS  (Male / Female) Normal Values 2D ECHO LV Diastolic Diameter PLAX        4.6 cm                4.2 - 5.9 / 3.9 - 5.3 cm LV Systolic Diameter PLAX         2.9 cm                2.1 - 4.0 cm IVS Diastolic Thickness           1.2 cm                LVPW Diastolic Thickness          1.3 cm                LVOT Diameter                     2.2 cm                LV Ejection Fraction MOD BP       54.8 %                 >= 55  % LV Ejection Fraction MOD 4C       48.2 %                LV Ejection Fraction MOD 2C       54.3 %                IVC Diameter                      2.1 cm                M-MODE Aortic Root Diameter MM           4.3 cm                DOPPLER AV Peak Velocity                  1.1 m/s               AV Peak Gradient                  5.1 mmHg              AV Mean Gradient                  3.1 mmHg              LVOT Peak Velocity                0.85 m/s              AV Area Cont Eq vti               2.7 cm2               MV Velocity Time Integral         35 cm                 TR Peak Velocity                  249 cm/s              PV Peak Velocity                  0.99 m/s              PV Peak Gradient                  3.9 mmHg              RVOT Peak Velocity                0.82 m/s              * Indicates values subject to auto-interpretation LV EF:        % FINDINGS Left Ventricle Normal left ventricular size.  Mild concentric left ventricular hypertrophy.  Mildly depressed left ventricular function.  Left ventricular ejection fraction is visually estimated to be 45%. Endocardial borders not well visualized to accurate assess wall motion abnormalities.  Diastolic function indeterminate due to mitral valve replacement and atrial fibrillation. Right Ventricle The right ventricle was grossly normal in size and function. Right Atrium Enlarged right atrium. Left Atrium Severely dilated left atrium. Left atrial volume index is 80  mL/sq m. Mitral Valve Known mitral valve bioprosthesis which is functioning normally, transvalvular gradient is 6.5 mmHg @120. Aortic Valve Structurally normal aortic valve without significant stenosis or regurgitation. Tricuspid Valve Structurally normal tricuspid valve. No tricuspid stenosis. Moderate tricuspid regurgitation. Estimated right ventricular systolic pressure  is 35 mmHg. Pulmonic Valve Structurally normal pulmonic valve without significant stenosis or  regurgitation. Pericardium No pericardial effusion seen. Pleural effusion noted. Aorta The aortic root is normal. Yogesh Galindo MD (Electronically Signed) Final Date:     2021                 22:48    EC-KD W/O CONT    Result Date: 2021  Transesophageal Echo Report Echocardiography Laboratory CONCLUSIONS No evidence of endocarditis. Mildly reduced left ventricular systolic function. Left ventricular ejection fraction is visually estimated to be 45%. Normal right ventricular systolic function. The left atrium is surgically absent. Known mitral valve bioprosthesis which is functioning normally with appropriate transvalvular gradient. Structurally normal tricuspid valve without significant stenosis Moderate tricuspid regurgitation. Estimated right ventricular systolic pressure  is 35 mmHg. AILYN MCINTOSH Exam Date:          2021                     11:02 Exam Location:      Inpatient Priority:            Routine Ordering Physician:        JACK GARCIA  ([id number]) Referring Physician:       [id number] Haynes Sonographer:               Yonis Wilder                            [facility] Age:    65     Gender:    M MRN:    [id number] :    1955 BSA:    2.31   Ht (in):    70     Wt (lb):    254 Report Type:      Complete Indications:     Endocarditis and heart valve disorders in diseases                  classified elsewhere ICD Codes:       I39 CPT Codes:       82156 BP:   107    /   77     HR: Technical Quality:       Good MEASUREMENTS  (Male / Female) Normal Values 2D ECHO Estimated LV Ejection Fraction    45 %                  * Indicates values subject to auto-interpretation LV EF:  45    % Medications The patient was sedated on a ventilator. An additional 2 mg IV versed was given, I personally supervised moderate sedation with the bedside ICU nurse from 11:05 to 11:16 AM Limitations none Complications none Proc. Components The probe was inserted and manipulated by  "Dr. Cedeno. Probe # [id number] 2  was used for this procedure. FINDINGS Left Ventricle Mildly reduced left ventricular systolic function. Left ventricular ejection fraction is visually estimated to be 45%. Right Ventricle Moderately dilated right ventricle. Normal right ventricular systolic function.  Pacer wire seen in right ventricle. Right Atrium The right atrium is normal in size.  Pacemaker wire present in the right atrial cavity. Left Atrium The left atrium is surgically absent. LA Appendage Normal left atrial appendage. IA Septum The interatrial septum is normal. IV Septum The interventricular septum is normal. Mitral Valve Known mitral valve bioprosthesis which is functioning normally with appropriate transvalvular gradient. No valvular vegetations. Aortic Valve Structurally normal aortic valve without significant stenosis or regurgitation.  No valvular vegetations. Tricuspid Valve Structurally normal tricuspid valve without significant stenosis Moderate tricuspid regurgitation. Estimated right ventricular systolic pressure  is 35 mmHg.   No valvular vegetations. Pulmonic Valve Structurally normal pulmonic valve without significant stenosis or regurgitation. Pericardium Normal pericardium without effusion. Aorta The aortic root is normal. Josafat Cedeno MD (Electronically Signed) Final Date:     06 February 2021                 13:06      Micro:  Results     Procedure Component Value Units Date/Time    BLOOD CULTURE [[id number]] Collected: 02/06/21 2057    Order Status: Completed Specimen: Blood from Peripheral Updated: 02/11/21 2300     Significant Indicator NEG     Source BLD     Site PERIPHERAL     Culture Result No growth after 5 days of incubation.    Narrative:      Collected By:[id number] PRINCE CAS SKY  Per Hospital Policy: Only change Specimen Src: to \"Line\" if  specified by physician order.  Right Forearm/Arm    BLOOD CULTURE [[id number]] Collected: 02/06/21 2043    Order Status: Completed Specimen: " "Blood from Peripheral Updated: 02/11/21 2300     Significant Indicator NEG     Source BLD     Site PERIPHERAL     Culture Result No growth after 5 days of incubation.    Narrative:      Collected By:[id number] PRINCE CAS SKY  Per Hospital Policy: Only change Specimen Src: to \"Line\" if  specified by physician order.  Left Forearm/Arm    C Diff by PCR rflx Toxin [[id number]] Collected: 02/11/21 1548    Order Status: Completed Specimen: Stool Updated: 02/11/21 1728     C Diff by PCR Negative     Comment: C. difficile NOT detected by PCR.  Treatment not indicated per guidelines.  Repeat testing not indicated within 7 days.          027-NAP1-BI Presumptive Negative     Comment: Presumptive 027/NAP1/BI target DNA sequences are NOT DETECTED.       Narrative:      Special Contact [id number] YENNY COTTO  Does this patient have risk factors for C-diff?->Yes    BLOOD CULTURE [[id number]] Collected: 02/10/21 1050    Order Status: Completed Specimen: Blood from Peripheral Updated: 02/11/21 0707     Significant Indicator NEG     Source BLD     Site PERIPHERAL     Culture Result No Growth  Note: Blood cultures are incubated for 5 days and  are monitored continuously.Positive blood cultures  are called to the RN and reported as soon as  they are identified.      Narrative:      Per Hospital Policy: Only change Specimen Src: to \"Line\" if  specified by physician order.  Left Hand    BLOOD CULTURE [[id number]] Collected: 02/10/21 1050    Order Status: Completed Specimen: Blood from Peripheral Updated: 02/11/21 0648     Significant Indicator NEG     Source BLD     Site PERIPHERAL     Culture Result No Growth  Note: Blood cultures are incubated for 5 days and  are monitored continuously.Positive blood cultures  are called to the RN and reported as soon as  they are identified.      Narrative:      Per Hospital Policy: Only change Specimen Src: to \"Line\" if  specified by physician order.  No site indicated    C Diff by PCR rflx Toxin " "[[id number]]     Order Status: Canceled Specimen: Stool     Blood Culture [[id number]] Collected: 02/04/21 1757    Order Status: Completed Specimen: Blood from Peripheral Updated: 02/09/21 2101     Significant Indicator NEG     Source BLD     Site PERIPHERAL     Culture Result No growth after 5 days of incubation.    Narrative:      From different peripheral sites, if not done within the last  24 hours (Per Hospital Policy: Only change specimen source to  \"Line\" if specified by physician order)  No site indicated    URINE CULTURE(NEW) [[id number]] Collected: 02/04/21 2148    Order Status: Completed Specimen: Urine Updated: 02/07/21 0826     Significant Indicator NEG     Source UR     Site -     Culture Result No growth at 48 hours.    Narrative:      Indication for culture:->Evaluation for sepsis without a  clear source of infection  Have you been in close contact with a person who is suspected  or known to be positive for COVID-19 within the last 30 days  (e.g. last seen that person < 30 days ago)->No  Indication for culture:->Evaluation for sepsis without a    BLOOD CULTURE [[id number]]  (Abnormal)  (Susceptibility) Collected: 02/04/21 1630    Order Status: Completed Specimen: Blood from Peripheral Updated: 02/07/21 0719     Significant Indicator POS     Source BLD     Site PERIPHERAL     Culture Result Growth detected by Bactec instrument. 02/05/2021  05:51      Streptococcus pyogenes (Group A)  This isolate does NOT demonstrate inducible Clindamycin  resistance in vitro.      Narrative:      CALL  Swain  19 tel. [phone],  CALLED  19 tel. [phone] 02/06/2021, 10:44, RB PERF. RESULTS CALLED TO:  [id number] RN  Per Hospital Policy: Only change Specimen Src: to \"Line\" if  specified by physician order.  No site indicated    Susceptibility     Streptococcus pyogenes (group a) (1)     Antibiotic Interpretation Microscan Method Status    Penicillin Sensitive 0.032 mcg/mL E-TEST Final    Cefotaxime Sensitive 0.023 mcg/mL " "E-TEST Final                   E-Test [[id number]] Collected: 02/04/21 1630    Order Status: Completed Specimen: Other Updated: 02/06/21 1045     ETEST Sensitivity INTERIM    Narrative:      19 tel. [phone] 02/06/2021, 10:44, RB PERF. RESULTS CALLED TO:  [id number] RN  Per Hospital Policy: Only change Specimen Src: to \"Line\" if  specified by physician order.          Assessment:  Active Hospital Problems    Diagnosis    • *Cellulitis of right lower extremity [L03.115]    • Fluid overload secondary to IV fluids  [E87.70]    • Atrial fibrillation with RVR ([facility]) [I48.91]    • Sepsis ([facility]) [A41.9]    • COPD (chronic obstructive pulmonary disease) ([facility]) [J44.9]    • Type 2 diabetes mellitus without complication, without long-term current use of insulin ([facility]) [E11.9]    • Lactic acid acidosis [E87.2]    • Acute respiratory failure with hypoxia ([facility]) [J96.01]    • Paroxysmal SVT (supraventricular tachycardia) ([facility]) [I47.1]    • Leukocytosis with bandemia [D72.825]      Interval 24 hours:      AF, O2 RA  Labs reviewed  Micro reviewed    Patient continues to have right leg swelling and pain with skin necrosis and breakdown, not significantly changed.  New area of bruising and pain on right arm.  Patient continued on ceftriaxone and Flagyl.    Assessment:    65 y.o. obese man with a history of a bioprosthetic mitral valve replacement in March 2017, type 2 diabetes mellitus, pacemaker placement in July 2014, coronary artery disease and COPD admitted after being found down.  Patient admitted for septic shock secondary to severe right lower extremity cellulitis with group A streptococcal bacteremia.  Blood culture on 2/4 (1 out of 2 sets) + group A streptococcus.  Repeat blood cultures on 2/6 remain negative to date.  KD without any evidence of prosthetic valve or native valve endocarditis.    Septic shock, possible toxic shock syndrome, resolved     Group A streptococcus bacteremia.  Source right lower extremity cellulitis in " setting of prior MVR and ICD  -KD on 2/6 with no vegetations noted on valves or pacemaker wires  Right lower extremity cellulitis  Acute kidney injury, improving  Leukocytosis, ongoing, improved today  Bioprosthetic mitral valve replacement  Type 2 diabetes mellitus  Diarrhea/nausea -improved  -If still tested negative on 2/11  Right arm bruising, swelling, warmth and tenderness  -Potential hematoma versus superficial thrombophlebitis or DVT, per primary team blood clot is unlikely given the patient has been on anticoagulation    Plan:   --Continue ceftriaxone and Flagyl, oral oral options are available given the bacteremia in the setting of prosthetic valve  --- Antticipate a 14-day course of antibiotics from 2/6.  Stop date 2/20/2021  ----Follow-up repeat blood cultures -no growth to date   --- Monitor closely and if decompensates will transition antibiotics to Zosyn and linezolid  --- Recommend US of right arm and changing IV to left arm, rule-out hematoma or DVT  --- Patient will likely need long-term wound care given the extent of breakdown on the right lower leg    Discussed with primary team, Dr. Ponce. ID will continue to follow.

## 2021-02-13 NOTE — CARE PLAN
Problem: Pain Management  Goal: Pain level will decrease to patient's comfort goal  Description: Pt assessed for pain regularly and medicated PRN per MAR.    2/13/2021 1452 by Elliott Brunson R.N.  Outcome: PROGRESSING AS EXPECTED  2/13/2021 1451 by Elliott Brunson R.N.  Outcome: PROGRESSING AS EXPECTED     Problem: Pain Management  Goal: Pain level will decrease to patient's comfort goal  Description: Pt assessed for pain regularly and medicated PRN per MAR.    2/13/2021 1452 by Elliott Brunson R.N.  Outcome: PROGRESSING AS EXPECTED  2/13/2021 1451 by Elliott Brunson R.N.  Outcome: PROGRESSING AS EXPECTED     Problem: Skin Integrity  Goal: Risk for impaired skin integrity will decrease  Description: Pt turned and positioned every two hours. Incontinence care provided and barrier paste applied as needed.    Outcome: PROGRESSING AS EXPECTED  Note: Pt turned and positioned every two hours. Incontinence care provided and barrier paste applied as needed.

## 2021-02-13 NOTE — CARE PLAN
Problem: Knowledge Deficit  Goal: Knowledge of disease process/condition, treatment plan, diagnostic tests, and medications will improve  Outcome: PROGRESSING AS EXPECTED     Problem: Fluid Volume:  Goal: Will maintain balanced intake and output  Outcome: PROGRESSING AS EXPECTED      No

## 2021-02-13 NOTE — PROGRESS NOTES
Daily Progress Note:     Date of Service: 2/13/2021  Primary Team: [facility] IM Yellow Team   Attending: Dr. Ponce  Senior Resident: Dr. Clifford  Contact:  [phone]    Chief Complaint: Fall    Subjective:  Patient is feeling mild improvement of his right LE wound, denies significant wound drainage, no warmth of extremity, denies fever,chills,n/v    Interval update:   Continue IV abx for bacteremia  U/S ordered of RUE   IV access on LUE only please (if possible)  Picc line decisions BRENDA stewart talking to staff about this         Review of Systems:    Review of Systems   Constitutional: Negative for chills and fever.   HENT: Negative for nosebleeds.    Eyes: Negative for discharge.   Respiratory: Negative for hemoptysis, sputum production and stridor.    Cardiovascular: Positive for leg swelling. Negative for chest pain and palpitations.   Gastrointestinal: Negative for blood in stool and diarrhea.   Genitourinary: Negative for dysuria and frequency.   Musculoskeletal: Positive for joint pain.   Skin: Negative for itching.   Neurological: Negative for dizziness and headaches.   Endo/Heme/Allergies: Does not bruise/bleed easily.   Psychiatric/Behavioral: Negative for depression. The patient is not nervous/anxious.    All other systems reviewed and are negative.    Objective Data:   Vitals:   Temp:  [36.4 °C (97.5 °F)-36.7 °C (98 °F)] 36.6 °C (97.8 °F)  Pulse:  [] 100  Resp:  [18-23] 20  BP: (113-133)/(80-83) 133/80  SpO2:  [91 %-94 %] 92 %   93-95 % on room air    Physical Exam:   Physical Exam  Constitutional:       General: He is not in acute distress.  HENT:      Head: Normocephalic and atraumatic.      Right Ear: External ear normal.      Left Ear: External ear normal.      Nose: Nose normal.      Mouth/Throat:      Mouth: Mucous membranes are moist.   Eyes:      Extraocular Movements: Extraocular movements intact.      Pupils: Pupils are equal, round, and reactive to light.   Cardiovascular:      Rate and  Rhythm: Normal rate and regular rhythm.      Pulses: Normal pulses.   Pulmonary:      Effort: Pulmonary effort is normal. No respiratory distress.      Breath sounds: No wheezing or rales (Minimal).      Comments: Coarse.  Diminished.  Abdominal:      General: Abdomen is flat. Bowel sounds are normal. There is no distension.      Tenderness: There is no abdominal tenderness. There is no guarding.   Musculoskeletal:         General: Swelling and tenderness (On left knee and right elbow as well as site of cellulitis. ) present. No signs of injury.      Cervical back: Normal range of motion and neck supple.      Right lower leg: Edema present.      Left lower leg: Edema present.      Comments: Pitting edema to bilateral hips   Skin:     General: Skin is warm and dry.      Capillary Refill: Capillary refill takes less than 2 seconds.      Coloration: Skin is not jaundiced.      Findings: No bruising.      Comments:  Bullae, dark discoloration like with persisting erythema, sloughing of skin on right lower extremity, worsening Erythema not extending. Subsiding comparing to previous encounter.    Also dark discoloration of RUE ranging along entire medial forearm   Neurological:      General: No focal deficit present.      Mental Status: He is alert.      Cranial Nerves: No cranial nerve deficit.      Motor: No weakness.   Psychiatric:         Mood and Affect: Mood normal.         Behavior: Behavior normal.         Thought Content: Thought content normal.     Labs:  Recent Labs     02/11/21  0408 02/12/21  0754 02/13/21  0651   WBC 16.5* 15.3* 12.0*   RBC 3.04* 3.18* 2.97*   HEMOGLOBIN 9.6* 10.2* 9.7*   HEMATOCRIT 29.5* 30.4* 29.1*   MCV 97.0 95.6 98.0*   MCH 31.6 32.1 32.7   RDW 60.9* 59.4* 61.2*   PLATELETCT 295 242 271   MPV 10.7 10.8 10.4   NEUTSPOLYS 82.60* 77.90* 85.20*   LYMPHOCYTES 5.20* 6.20* 6.10*   MONOCYTES 2.60 1.80 2.60   EOSINOPHILS 1.70 6.20 0.00   BASOPHILS 0.00 0.00 0.90   RBCMORPHOLO Present Present  Present     Recent Labs     02/11/21  0408 02/12/21  0754 02/13/21  0651   SODIUM 132* 132* 136   POTASSIUM 3.7 3.6 3.7   CHLORIDE 98 97 100   CO2 26 24 26   GLUCOSE 114* 128* 130*   BUN 24* 22 21   CPKTOTAL 612* 482*  --      Recent Labs     02/11/21  0408 02/12/21  0754 02/13/21  0651   ALBUMIN 2.8* 2.6* 2.7*   TBILIRUBIN 0.7 0.6 0.8   ALKPHOSPHAT 76 78 70   TOTPROTEIN 5.8* 5.9* 5.7*   ALTSGPT 22 22 18   ASTSGOT 33 34 27   CREATININE 1.05 1.15 1.12       Micro:  Blood cultures positive on 2/4 with GAS  Blood cultures negative on 2/6  Blood cultures from 02/10 and 2/11 negative.     EKG:  None    Imaging:   CT right leg: Shows extensive edema, cellulitis, superficial fasciitis.  No soft tissue gas.  No osteomyelitis.    Meds:  Current Outpatient Medications   Medication Instructions   • acetaminophen (TYLENOL) 1,000 mg, Oral, EVERY 6 HOURS PRN   • allopurinol (ZYLOPRIM) 100 mg, Oral, EVERY MORNING   • benzonatate (TESSALON) 100 mg, Oral, 3 TIMES DAILY PRN   • cetirizine (ZYRTEC) 10 mg, Oral, EVERY MORNING   • colchicine (COLCRYS) 0.6 mg, Oral, 2 TIMES DAILY   • furosemide (LASIX) 20 mg, Oral, DAILY   • hydrOXYzine HCl (ATARAX) 25 mg, Oral, 3 TIMES DAILY PRN   • lisinopril (PRINIVIL) 2.5 mg, Oral, EVERY EVENING   • metFORMIN (GLUCOPHAGE) 500 mg, Oral, 2 TIMES DAILY WITH MEALS   • metoprolol SR (TOPROL XL) 50 mg, Oral, EVERY MORNING   • tamsulosin (FLOMAX) 0.4 MG capsule TAKE 1 CAPSULE BY MOUTH EVERY DAY   • warfarin (COUMADIN) 2 mg, Oral, SEE ADMIN INSTRUCTIONS, Takes 2 mg every Friday has 5 mg tablets to take all other days   • warfarin (COUMADIN) 5 mg, Oral, SEE ADMIN INSTRUCTIONS, Takes Monday, Tuesday, Wednesday, Thursday, Saturday, and Sunday, has 2 mg tablets to take on Fridays      Scheduled Medications   Medication Dose Frequency   • enoxaparin (LOVENOX) injection  100 mg Q12HRS   • cefTRIAXone (ROCEPHIN) IV  2 g Q24HRS   • metroNIDAZOLE  500 mg Q8HRS   • lisinopril  2.5 mg Q DAY   • furosemide  40 mg BID  DIURETIC   • allopurinol  100 mg QAM   • insulin lispro  0-10 Units TID AC   • metoprolol SR  50 mg Q DAY   • acetaminophen  650 mg TID   • zinc sulfate  220 mg DAILY   • multivitamin  1 tablet DAILY   • [START ON 2/20/2021] amiodarone  200 mg DAILY   • amiodarone  400 mg TWICE DAILY      Consultants/Specialty:  Wound  PT  OT  Cardiology  Infectious disease    Problem Representation:   Morales Olivier is a 65-year-old male, admitted on Feb 4th for 3/4 SIRS criteria, concern for severe sepsis secondary to RLE cellulitis.  Admitted for severe sepsis versus septic shock.  Patient intubated in the ER after failing BiPAP.  Despite history of sick sinus syndrome status post pacemaker, patient found to be in SVT which later progressed to A. fib with RVR, on anticoagulation with warfarin.  Cardiology started amiodarone. The patient was successfully extubated on 2/8.  Now being treated for group A cellulitis with IV antibiotics.  On 2/10 ID broadened ABX to ceftriaxone plus Flagyl till 2/20, with repeat Ct bgative for abscess. CPK downtrending , warfarin switched to lovenox in prep for picc line     * Bacteremia in the setting of Cellulitis of right lower extremity- (present on admission)  Assessment & Plan  Blood cultures 2/4 grew GAS, with repeat blood cultures negative.   ID following.  Recommends 14 days of antibiotics from 2/6- 2/20 with ceftriaxone and Flagyl, as blood cell count has remained high despite adequate IV antibiotics. Cannot  Switch PO abx given bacteremia indication /valve  CT leg negative for abscess.   Blood cultures negative so far from 2/11 (as well as 2/10 and 2/6)  Pain: oxy 5-10   Zofran prn for emesis  Procal downtrended to .30  WBC today 12.0, CTM         Picc line decisions pending, CM talking to staff about this     Right Upper Extremity Swelling and Warmth      U/S ordered of RUE; of note is not suspecting clot formation as on lovenox,         more likely IV infiltrated       IV access and lab  draws on LUE only please (if possible)      Picc line decisions pending, CM talking to staff about this but     Fluid overload secondary to IV fluids   Assessment & Plan  Likely secondary to IV fluids given per sepsis protocol, Echo Ef of 45%, normal right ventricular function  Patient now presents with pitting edema to hips  Patient does not look septic  WBC improved since admission   No signs of sepsis. Without tachycardia or tachypnea or fever  Blood pressure stable  Lasix 40 oral twice daily- titrate as needed.  Continue ins and outs  Daily weights  Elevate legs  Outpatient repeat echocardiogram recommended by cardiology whose recommendations are appreciated.       On low dose lisinopril as of 02/11       Atrial fibrillation with RVR ([facility])- (present on admission)  Assessment & Plan  On telemetry, ventricularly paced rhythm   Amiodarone therapy started by cardiology.  Anticoagulation updated from warfarin to lovenox  Patient has pacemaker for sick sinus syndrome  Continue metoprolol XL- uptitrate as needed.      Sepsis ([facility])- (present on admission)  Assessment & Plan  Resolving  Severe Sepsis Present on admission, with respiratory failure and EBEN  Patient met 3/4 SIRS criteria on admission  Admission WBC 28.8 -> now trended down since admission.  Source of infection is left lower extremity cellulitis  Sepsis protocol initiated.  Patient received IV fluids and antibiotics  Patient no longer shows signs of endorgan damage  Echo and KD normal, no vegetation  Continue ceftriaxone and flagyl.   C diff negative    COPD (chronic obstructive pulmonary disease) ([facility])- (present on admission)  Assessment & Plan  Not in exacerbation  18-pack-year history  Patient now on ipratropium nebulizer    Type 2 diabetes mellitus without complication, without long-term current use of insulin ([facility])- (present on admission)  Assessment & Plan  Recent A1c 7.6  Patient is not on home insulin  SSI lispro  Likely will not need insulin  therapy at home    Leukocytosis with bandemia  Assessment & Plan  -See cellulitis plan    Lactic acid acidosis- (present on admission)  Assessment & Plan  Resolved  Secondary to tissue ischemia in setting of severe sepsis.    Paroxysmal SVT (supraventricular tachycardia) ([facility])- (present on admission)  Assessment & Plan  Initially noted to be in supraventricular tachycardia changing to A. fib with RVR.  Refer to afib plan.    Acute respiratory failure with hypoxia ([facility])- (present on admission)  Assessment & Plan  Resolved  Likely combination of sepsis, A. fib RVR  Now saturating 93 to 95% on room air  Incentive spirometer    Gout Flare      -possible that this could be from seeding in setting of bacteremia; brought this     up to ID      -Was previously startedon colchicine and home allopurinol.       -Unlikely septic arthritis as characteristics similar to gout attacks he has had in     the past and blood counts are improving.     Code status: Full code  Diet: Cardiac diabetic  Lines/ tubes/ drains: Condom cath  IV fluids: None  GI ppx: MiraLAX stopped.  DVT ppx: Warfarin  SNF referral placed

## 2021-02-14 NOTE — PROGRESS NOTES
Infectious Disease Progress Note    Author: Luciana Ingram M.D. Date & Time of service: 2021  2:30 PM    Chief Complaint:  Septic shock, RLE cellulitits. GAS bacteremia     Interval History:   AF, O2 RA 2 L NC, pressors remain off. States the leg still is painful but improving overall.  He is continued on IV penicillin as below.   AF, O2 2 L NC, ongoing edema and drainage of right leg  2/10 AF, O2 RA, complaining of nausea today, no vomiting and has had some diarrhea.  His right leg continues to have edema, erythema and warmth.  Some increase in WBC today. Patient's antibiotics transition from penicillin to ceftriaxone and Flagyl.    AF, O2 RA, watery bowel movements.  Still with significant edema, erythema warmth and drainage from right leg.  New areas of superficial appearing necrosis on front of shin and some blistering around ankle.  CT with superficial fasciitis, no gas in tissues and no drainable abscess.  She continued on ceftriaxone and Flagyl for now.  C. difficile testing ordered.    AF, O2 RA, continues to have right leg swelling and pain with skin necrosis and breakdown, not significantly changed.  New area of bruising and pain on right arm.      Review of Systems:  ROS    Hemodynamics:  Temp (24hrs), Av.3 °C (97.4 °F), Min:36.1 °C (97 °F), Max:36.8 °C (98.2 °F)  Temperature: 36.3 °C (97.3 °F)  Pulse  Av.2  Min: 64  Max: 193   Blood Pressure : 137/79       Physical Exam:  Physical Exam    Meds:    Current Facility-Administered Medications:   •  enoxaparin (LOVENOX) injection  •  cefTRIAXone (ROCEPHIN) IV  •  metroNIDAZOLE  •  lisinopril  •  ipratropium  •  colchicine  •  allopurinol  •  [DISCONTINUED] insulin regular **AND** POC Blood Glucose **AND** NOTIFY MD and PharmD **AND** dextrose 50%  •  insulin lispro  •  metoprolol SR  •  acetaminophen  •  zinc sulfate  •  multivitamin  •  [START ON 2021] amiodarone  •  amiodarone  •  acetaminophen  •  oxyCODONE  immediate-release  •  morphine injection  •  Respiratory Therapy Consult  •  labetalol  •  ondansetron  •  ondansetron    Labs:  Recent Labs     02/12/21 0754 02/13/21  0651 02/14/21  0213   WBC 15.3* 12.0* 11.2*   RBC 3.18* 2.97* 3.17*   HEMOGLOBIN 10.2* 9.7* 10.2*   HEMATOCRIT 30.4* 29.1* 31.5*   MCV 95.6 98.0* 99.4*   MCH 32.1 32.7 32.2   RDW 59.4* 61.2* 63.2*   PLATELETCT 242 271 249   MPV 10.8 10.4 10.3   NEUTSPOLYS 77.90* 85.20* 81.70*   LYMPHOCYTES 6.20* 6.10* 7.00*   MONOCYTES 1.80 2.60 1.70   EOSINOPHILS 6.20 0.00 1.70   BASOPHILS 0.00 0.90 0.00   RBCMORPHOLO Present Present Present     Recent Labs     02/12/21 0754 02/13/21  0651 02/14/21  0213   SODIUM 132* 136 129*   POTASSIUM 3.6 3.7 3.7   CHLORIDE 97 100 97   CO2 24 26 24   GLUCOSE 128* 130* 105*   BUN 22 21 19   CPKTOTAL 482*  --   --      Recent Labs     02/12/21 0754 02/13/21 0651 02/14/21  0213   ALBUMIN 2.6* 2.7* 2.7*   TBILIRUBIN 0.6 0.8 0.7   ALKPHOSPHAT 78 70 70   TOTPROTEIN 5.9* 5.7* 6.0   ALTSGPT 22 18 20   ASTSGOT 34 27 23   CREATININE 1.15 1.12 1.03       Imaging:  CT-EXTREMITY, LOWER WITH RIGHT    Result Date: 2/10/2021  2/10/2021 4:21 PM HISTORY/REASON FOR EXAM:  Soft tissue infection suspected, femur, initial exam. TECHNIQUE/EXAM DESCRIPTION AND NUMBER OF VIEWS:  CT scan of the RIGHT lower extremity with contrast, with reconstructions. Thin helical 3 mm sections were obtained from the distal femur through the proximal tibia/fibula. Sagittal and coronal multiplanar reconstructions were generated from the axial images. A total of 100 mL of Omnipaque 350 nonionic contrast was administered  IV without complication. Up to date radiation dose reduction adjustments have been utilized to meet ALARA standards for radiation dose reduction. COMPARISON: 12/20/2019 FINDINGS: There is relatively diffuse right lower extremity skin thickening, subcutaneous fat infiltration which could be related to edema and/or cellulitis and superficial fasciitis.  There is no soft tissue gas suggests a necrotizing infection. No focal fluid collection. Mildly prominent right groin lymph node is probably reactive. Please note that the foot is incompletely visualized on this study. There are degenerative changes seen within the mid and hindfoot. Degenerative changes of the knee. There is no CT evidence for osteomyelitis. Small bilateral fat-containing inguinal hernias. There is diffuse atherosclerosis.     Extensive right lower extremity edema and/or cellulitis and superficial fasciitis. No soft tissue gas. No evidence of drainable abscess. No CT evidence of osteomyelitis. Atherosclerosis.    DX-CHEST-LIMITED (1 VIEW)    Result Date: 2/5/2021 2/5/2021 5:29 PM HISTORY/REASON FOR EXAM:  CL placement TECHNIQUE/EXAM DESCRIPTION AND NUMBER OF VIEWS: Single AP view of the chest. COMPARISON: 2/5/2021 FINDINGS: Right central line projects over the SVC. Enteric tube passes below the level of the diaphragm. Endotracheal tube projects at the level of the aortic arch. There is right-sided pacer. Sternotomy wires are seen. The cardiomediastinal silhouette is stable.  There is blunting of the right costophrenic angle. There is hazy right basilar opacity which is unchanged. No pneumothorax is identified.     Right central line projects over the SVC. No pneumothorax. Small right pleural effusion with hazy right basilar opacity which may represent atelectasis or pneumonitis. Stable prominence of the cardiomediastinal silhouette.     DX-CHEST-PORTABLE (1 VIEW)    Result Date: 2/8/2021 2/7/2021 11:30 PM HISTORY/REASON FOR EXAM:  For indication of respiratory failure; For indication of respiratory failure TECHNIQUE/EXAM DESCRIPTION AND NUMBER OF VIEWS: Single portable view of the chest. COMPARISON: Study from earlier the same date at 6:24 AM FINDINGS: Hardware is stably positioned in its visualized extent. HEART: Stable size. LUNGS: Lung volumes are low. There are perihilar opacities. There are  bibasilar opacities. PLEURA: Neither costophrenic sulcus is well seen     1.  Increased BILATERAL atelectasis which could obscure an additional process such as pulmonary edema or pneumonia 2.  Possible BILATERAL pleural effusions    DX-CHEST-PORTABLE (1 VIEW)    Result Date: 2/7/2021 2/7/2021 6:12 AM HISTORY/REASON FOR EXAM:  For indication of respiratory failure; For indication of respiratory failure TECHNIQUE/EXAM DESCRIPTION AND NUMBER OF VIEWS: Single portable view of the chest. COMPARISON: Yesterday FINDINGS: The cardiomediastinal silhouettes are stable. There are ongoing bibasilar opacities, most likely atelectasis, worse on the right, as well as a small right pleural effusion. Endotracheal tube is no longer present. Right central venous line is unchanged in  position.     Stable bibasilar opacities, right greater than left.    DX-CHEST-PORTABLE (1 VIEW)    Result Date: 2/6/2021 2/6/2021 5:18 AM HISTORY/REASON FOR EXAM:  For indication of respiratory failure; For indication of respiratory failure TECHNIQUE/EXAM DESCRIPTION AND NUMBER OF VIEWS: Single portable view of the chest. COMPARISON: Yesterday FINDINGS: The cardiomediastinal silhouettes are stable. There is ongoing bibasilar atelectasis, mainly on the right, but with a small right pleural effusion. Support devices are positionally unchanged.     Stable chest findings compared to 2/5.    DX-CHEST-PORTABLE (1 VIEW)    Result Date: 2/5/2021 2/5/2021 5:22 AM HISTORY/REASON FOR EXAM:  For indication of respiratory failure; For indication of respiratory failure TECHNIQUE/EXAM DESCRIPTION AND NUMBER OF VIEWS: Single portable view of the chest. COMPARISON: Yesterday FINDINGS: Cardiac size is unchanged. Interstitial opacities persist in the mid and lower lung fields bilaterally, along with right basilar atelectasis and probable small right pleural effusion. Endotracheal tube is positionally unchanged.     Ongoing findings consistent with mild pulmonary edema,  with right basilar atelectasis and small right pleural effusion. No interval change.    DX-CHEST-PORTABLE (1 VIEW)    Result Date: 2021 7:55 PM HISTORY/REASON FOR EXAM:  Endotracheal tube placement TECHNIQUE/EXAM DESCRIPTION AND NUMBER OF VIEWS: Single portable view of the chest. COMPARISON: Prior image today at 1643 hours FINDINGS: An endotracheal tube is present, the tip of which is 2 to 3 cm above the corina. Overall cardiopulmonary appearance is unchanged.     Endotracheal tube in place as noted above. Stable cardiopulmonary appearance.    DX-CHEST-PORTABLE (1 VIEW)    Result Date: 2021 4:38 PM HISTORY/REASON FOR EXAM:  possible sepsis.. Shortness of breath TECHNIQUE/EXAM DESCRIPTION AND NUMBER OF VIEWS: Single portable view of the chest. COMPARISON: 2020 FINDINGS: Patient is status post cardiac surgery. Single-lead right-sided pacemaker present. External pacer pads are present. There is right pleural fluid. There is vascular congestion. No pneumothorax.     Enlarged cardiac silhouette status post cardiac surgery with small right-sided pleural effusion and vascular congestion.    US-EXTREMITY VENOUS LOWER BILAT    Result Date: 2021   Vascular Laboratory  CONCLUSIONS  No superficial or deep venous thrombosis.  AILYN MCINTOSH  Exam Date:     2021 09:32  Room #:     Inpatient  Priority:     Stat  Ht (in):             Wt (lb):  Ordering Physician:        JO ANN GARCIA  Referring Physician:       [id number] Diamond  Sonographer:               Gladys Clark RVT  Study Type:                Complete Bilateral  Technical Quality:         Adequate  Age:    65    Gender:     M  MRN:    [id number]  :    1955      BSA:  Indications:     Edema, unspecified, Localized swelling, mass and lump,                   unspecified lower limb  CPT Codes:       88940  ICD Codes:       R60.9  R22.40  History:         Bilateral lower extremity swelling/edema. Prior study done                    12/15/20.  Limitations:  PROCEDURES:  Bilateral lower extremity venous duplex imaging.  The following venous structures were evaluated: common femoral, profunda  femoral, greater saphenous, femoral, popliteal , peroneal and posterior  tibial veins.  Serial compression, augmentation maneuvers,  color and spectral Doppler  flow evaluations were performed.  FINDINGS:  Bilateral lower extremities -  No superficial or deep venous thrombosis.  Complete color filling and compressibility with normal venous flow dynamics  including spontaneous flow, response to augmentation maneuvers, and  respiratory phasicity.  The peroneal and posterior tibial veins are difficult to assess for  compressibility, but flow response to augmentation is demonstrated.  Alo Mohamud MD  (Electronically Signed)  Final Date:      2021                   09:54    US-EXTREMITY VENOUS UPPER UNILAT RIGHT    Result Date: 2021   Upper Extremity  Venous Duplex Report  Vascular Laboratory  CONCLUSIONS  A small non occlusive superficial thrombus is visualized in the cephalic  vein at the proximal forearm.  DAYNAAILYN  Exam Date:     2021 22:34  Room #:     Inpatient  Priority:     Routine  Ht (in):             Wt (lb):  Ordering Physician:        GEORGE SWAN  Referring Physician:       [id number] Chavarria  Sonographer:               Rommel Gonzalez RDMS, RVT  Study Type:                Complete Unilateral  Technical Quality:         Adequate  Age:    65    Gender:     M  MRN:    [id number]  :    1955      BSA:  Indications:     Swelling of Limb, Edema  CPT Codes:       06804  ICD Codes:       729.81  782.3  History:         Right upper extremity swelling/edema and extensive bruising.                    No priors.  Limitations:  PROCEDURES:  Right upper extremity venous duplex imaging.  The following venous structures were evaluated: internal jugular,  subclavian, axillary, brachial,  cephalic and basilic veins.  Serial compression, augmentation maneuvers,  color and spectral Doppler  flow evaluations were performed.  FINDINGS:  Right upper extremity.  A small non occlusive thrombus is visualized in the cephalic vein at the  proximal forearm.  No evidence of deep venous thrombosis.  Flow was evaluated in the contralateral subclavian vein and normal venous  flow dynamics including spontaneous flow, respiratory phasic variation and  augmentation were demonstrated.  Evelio Rojo MD  (Electronically Signed)  Final Date:      2021                   04:42    EC-ECHOCARDIOGRAM COMPLETE W/O CONT    Result Date: 2021  Transthoracic Echo Report Echocardiography Laboratory CONCLUSIONS Mildly depressed left ventricular function.  Left ventricular ejection fraction is visually estimated to be 45%, likely affected by rapid heart rate. Endocardial borders not well visualized to accurate assess wall motion abnormalities. Recommend repeating limited study with contrast. The right ventricle was grossly normal in size and function. Severely dilated left atrium. Known mitral valve bioprosthesis which is functioning normally, transvalvular gradient is 6.5 mmHg @. Moderate tricuspid regurgitation. Pleural effusion noted. Compared to the study done on 2020, LVEF is measured lower, likely affected by rapid heart rate. Otherwise, no significant changes. AILYN MCINTOSH Exam Date:         2021                    20:52 Exam Location:     Inpatient Priority:          Routine Ordering Physician:        FIORDALIZA FERGUSON                             ([id number]) Referring Physician:       MARTY Theodore Sonographer:               Jasbir Alston RDCS Age:    65     Gender:    M MRN:    [id number] :    1955 BSA:    2.29   Ht (in):    70     Wt (lb):    250 Exam Type:     Complete Indications:     Persistent atrial fibrillation ICD Codes:       I48.1 CPT Codes:       64126 BP:   94     /   66     HR:  Technical Quality:       Poor MEASUREMENTS  (Male / Female) Normal Values 2D ECHO LV Diastolic Diameter PLAX        4.6 cm                4.2 - 5.9 / 3.9 - 5.3 cm LV Systolic Diameter PLAX         2.9 cm                2.1 - 4.0 cm IVS Diastolic Thickness           1.2 cm                LVPW Diastolic Thickness          1.3 cm                LVOT Diameter                     2.2 cm                LV Ejection Fraction MOD BP       54.8 %                >= 55  % LV Ejection Fraction MOD 4C       48.2 %                LV Ejection Fraction MOD 2C       54.3 %                IVC Diameter                      2.1 cm                M-MODE Aortic Root Diameter MM           4.3 cm                DOPPLER AV Peak Velocity                  1.1 m/s               AV Peak Gradient                  5.1 mmHg              AV Mean Gradient                  3.1 mmHg              LVOT Peak Velocity                0.85 m/s              AV Area Cont Eq vti               2.7 cm2               MV Velocity Time Integral         35 cm                 TR Peak Velocity                  249 cm/s              PV Peak Velocity                  0.99 m/s              PV Peak Gradient                  3.9 mmHg              RVOT Peak Velocity                0.82 m/s              * Indicates values subject to auto-interpretation LV EF:        % FINDINGS Left Ventricle Normal left ventricular size.  Mild concentric left ventricular hypertrophy.  Mildly depressed left ventricular function.  Left ventricular ejection fraction is visually estimated to be 45%. Endocardial borders not well visualized to accurate assess wall motion abnormalities.  Diastolic function indeterminate due to mitral valve replacement and atrial fibrillation. Right Ventricle The right ventricle was grossly normal in size and function. Right Atrium Enlarged right atrium. Left Atrium Severely dilated left atrium. Left atrial volume index is 80  mL/sq m. Mitral Valve Known mitral valve  bioprosthesis which is functioning normally, transvalvular gradient is 6.5 mmHg @120. Aortic Valve Structurally normal aortic valve without significant stenosis or regurgitation. Tricuspid Valve Structurally normal tricuspid valve. No tricuspid stenosis. Moderate tricuspid regurgitation. Estimated right ventricular systolic pressure  is 35 mmHg. Pulmonic Valve Structurally normal pulmonic valve without significant stenosis or regurgitation. Pericardium No pericardial effusion seen. Pleural effusion noted. Aorta The aortic root is normal. Yogesh Galindo MD (Electronically Signed) Final Date:     2021                 22:48    EC-KD W/O CONT    Result Date: 2021  Transesophageal Echo Report Echocardiography Laboratory CONCLUSIONS No evidence of endocarditis. Mildly reduced left ventricular systolic function. Left ventricular ejection fraction is visually estimated to be 45%. Normal right ventricular systolic function. The left atrium is surgically absent. Known mitral valve bioprosthesis which is functioning normally with appropriate transvalvular gradient. Structurally normal tricuspid valve without significant stenosis Moderate tricuspid regurgitation. Estimated right ventricular systolic pressure  is 35 mmHg. AILYN MCINTOSH Exam Date:          2021                     11:02 Exam Location:      Inpatient Priority:            Routine Ordering Physician:        JACK GARCIA  ([id number]) Referring Physician:       [id number] Moon Sonographer:               Yonis Wilder                            [facility] Age:    65     Gender:    M MRN:    [id number] :    1955 BSA:    2.31   Ht (in):    70     Wt (lb):    254 Report Type:      Complete Indications:     Endocarditis and heart valve disorders in diseases                  classified elsewhere ICD Codes:       I39 CPT Codes:       10693 BP:   107    /   77     HR: Technical Quality:       Good MEASUREMENTS  (Male /  Female) Normal Values 2D ECHO Estimated LV Ejection Fraction    45 %                  * Indicates values subject to auto-interpretation LV EF:  45    % Medications The patient was sedated on a ventilator. An additional 2 mg IV versed was given, I personally supervised moderate sedation with the bedside ICU nurse from 11:05 to 11:16 AM Limitations none Complications none Proc. Components The probe was inserted and manipulated by Dr. Cedeno. Probe # [id number]  was used for this procedure. FINDINGS Left Ventricle Mildly reduced left ventricular systolic function. Left ventricular ejection fraction is visually estimated to be 45%. Right Ventricle Moderately dilated right ventricle. Normal right ventricular systolic function.  Pacer wire seen in right ventricle. Right Atrium The right atrium is normal in size.  Pacemaker wire present in the right atrial cavity. Left Atrium The left atrium is surgically absent. LA Appendage Normal left atrial appendage. IA Septum The interatrial septum is normal. IV Septum The interventricular septum is normal. Mitral Valve Known mitral valve bioprosthesis which is functioning normally with appropriate transvalvular gradient. No valvular vegetations. Aortic Valve Structurally normal aortic valve without significant stenosis or regurgitation.  No valvular vegetations. Tricuspid Valve Structurally normal tricuspid valve without significant stenosis Moderate tricuspid regurgitation. Estimated right ventricular systolic pressure  is 35 mmHg.   No valvular vegetations. Pulmonic Valve Structurally normal pulmonic valve without significant stenosis or regurgitation. Pericardium Normal pericardium without effusion. Aorta The aortic root is normal. Josafat Cedeno MD (Electronically Signed) Final Date:     06 February 2021                 13:06      Micro:  Results     Procedure Component Value Units Date/Time    BLOOD CULTURE [[id number]] Collected: 02/06/21 2057    Order Status: Completed  "Specimen: Blood from Peripheral Updated: 02/11/21 2300     Significant Indicator NEG     Source BLD     Site PERIPHERAL     Culture Result No growth after 5 days of incubation.    Narrative:      Collected By:[id number] PRINCE CAS CELESTE.  Per Hospital Policy: Only change Specimen Src: to \"Line\" if  specified by physician order.  Right Forearm/Arm    BLOOD CULTURE [[id number]] Collected: 02/06/21 2043    Order Status: Completed Specimen: Blood from Peripheral Updated: 02/11/21 2300     Significant Indicator NEG     Source BLD     Site PERIPHERAL     Culture Result No growth after 5 days of incubation.    Narrative:      Collected By:[id number] PRINCE CAS CELESTE.  Per Hospital Policy: Only change Specimen Src: to \"Line\" if  specified by physician order.  Left Forearm/Arm    C Diff by PCR rflx Toxin [[id number]] Collected: 02/11/21 1548    Order Status: Completed Specimen: Stool Updated: 02/11/21 1728     C Diff by PCR Negative     Comment: C. difficile NOT detected by PCR.  Treatment not indicated per guidelines.  Repeat testing not indicated within 7 days.          027-NAP1-BI Presumptive Negative     Comment: Presumptive 027/NAP1/BI target DNA sequences are NOT DETECTED.       Narrative:      Special Contact [id number] YENNY COTTO  Does this patient have risk factors for C-diff?->Yes    BLOOD CULTURE [[id number]] Collected: 02/10/21 1050    Order Status: Completed Specimen: Blood from Peripheral Updated: 02/11/21 0707     Significant Indicator NEG     Source BLD     Site PERIPHERAL     Culture Result No Growth  Note: Blood cultures are incubated for 5 days and  are monitored continuously.Positive blood cultures  are called to the RN and reported as soon as  they are identified.      Narrative:      Per Hospital Policy: Only change Specimen Src: to \"Line\" if  specified by physician order.  Left Hand    BLOOD CULTURE [[id number]] Collected: 02/10/21 1050    Order Status: Completed Specimen: Blood from Peripheral Updated: " "02/11/21 0648     Significant Indicator NEG     Source BLD     Site PERIPHERAL     Culture Result No Growth  Note: Blood cultures are incubated for 5 days and  are monitored continuously.Positive blood cultures  are called to the RN and reported as soon as  they are identified.      Narrative:      Per Hospital Policy: Only change Specimen Src: to \"Line\" if  specified by physician order.  No site indicated    C Diff by PCR rflx Toxin [[id number]]     Order Status: Canceled Specimen: Stool     Blood Culture [[id number]] Collected: 02/04/21 1757    Order Status: Completed Specimen: Blood from Peripheral Updated: 02/09/21 2101     Significant Indicator NEG     Source BLD     Site PERIPHERAL     Culture Result No growth after 5 days of incubation.    Narrative:      From different peripheral sites, if not done within the last  24 hours (Per Hospital Policy: Only change specimen source to  \"Line\" if specified by physician order)  No site indicated          Assessment:  Active Hospital Problems    Diagnosis    • *Cellulitis of right lower extremity [L03.115]    • Fluid overload secondary to IV fluids  [E87.70]    • Atrial fibrillation with RVR ([facility]) [I48.91]    • Sepsis ([facility]) [A41.9]    • COPD (chronic obstructive pulmonary disease) ([facility]) [J44.9]    • Type 2 diabetes mellitus without complication, without long-term current use of insulin ([facility]) [E11.9]    • Lactic acid acidosis [E87.2]    • Acute respiratory failure with hypoxia ([facility]) [J96.01]    • Paroxysmal SVT (supraventricular tachycardia) ([facility]) [I47.1]    • Leukocytosis with bandemia [D72.825]        Plan:  ***  Discussed with internal medicine.  "

## 2021-02-14 NOTE — CARE PLAN
Problem: Knowledge Deficit  Goal: Knowledge of the prescribed therapeutic regimen will improve  Outcome: PROGRESSING AS EXPECTED  Intervention: Discuss information regarding therpeutic regimen and document in education  Note: Complete      Problem: Skin Integrity  Goal: Risk for impaired skin integrity will decrease  Description: Pt turned and positioned every two hours. Incontinence care provided and barrier paste applied as needed.    Outcome: PROGRESSING AS EXPECTED  Intervention: Assess and monitor skin integrity, appearance and/or temperature  Note: Patient on Q2 turns, condom cath to help with moisture, barrier cream, and patient educated

## 2021-02-14 NOTE — WOUND TEAM
Wound team in to see patient for dressing change and assessment of right LE.  Patient's bedside RN had changed dressings in the morning.  Patient did not want it changed due to pain to the area.  Will come tomorrow to reassess and dress right LE.     Left LE posterior wound is doing well.  Dressings changed to left LE.     Wound 02/11/21 Full Thickness Wound Leg Posterior Left (Active)   Wound Image   02/14/21 1300   Site Assessment Pink;Red 02/14/21 1300   Periwound Assessment Clean;Dry;Intact 02/14/21 1300   Margins Defined edges 02/14/21 1300   Closure Secondary intention 02/14/21 1300   Drainage Amount Small 02/14/21 1300   Drainage Description Serosanguineous 02/14/21 1300   Treatments Cleansed;Site care 02/14/21 1300   Wound Cleansing Normal Saline Irrigation 02/14/21 1300   Periwound Protectant Not Applicable 02/14/21 1300   Dressing Cleansing/Solutions Not Applicable 02/14/21 1300   Dressing Options Hydrofiber Silver;Nonadhesive Foam;Hypafix Tape 02/14/21 1300   Dressing Changed Changed 02/14/21 1300   Dressing Status Clean;Dry;Intact 02/14/21 1300   Dressing Change/Treatment Frequency Every 48 hrs, and As Needed 02/14/21 1300   NEXT Dressing Change/Treatment Date 02/16/21 02/14/21 1300   NEXT Weekly Photo (Inpatient Only) 02/21/21 02/14/21 1300   Non-staged Wound Description Full thickness 02/14/21 1300   Wound Length (cm) 0.6 cm 02/14/21 1300   Wound Width (cm) 0.5 cm 02/14/21 1300   Wound Depth (cm) 0.2 cm 02/14/21 1300   Wound Surface Area (cm^2) 0.3 cm^2 02/14/21 1300   Wound Volume (cm^3) 0.06 cm^3 02/14/21 1300   Wound Healing % 85 02/14/21 1300   Tunneling (cm) 0 cm 02/11/21 1301   Shape Oval 02/14/21 1300   Wound Odor None 02/14/21 1300   Exposed Structures None 02/14/21 1300

## 2021-02-14 NOTE — PROGRESS NOTES
Daily Progress Note:     Date of Service: 2/14/2021  Primary Team: [facility] IM Yellow Team   Attending: Dr. Ponce  Senior Resident: Dr. Clifford  Contact:  [phone]    Chief Complaint: Fall    Subjective:  Patient having nausea this morning, also endorsing pain of his RLE- no fever/chills, no increased drainage from site of his wound,     Interval update:   Becoming hyponatremic, appears euvolemic/hypovolemic in setting of daily lasix thus I will dc lasix and CTM his labs, also dc'ing his daily weights/Is and Os  U/S of RUE indicated cephalic vein thrombus (small, nonocclusive), no DVT  Continue wound care  Picc line decisions still pending , being discussed with CM   Educated on PRN pain meds    WBC downtrending      Review of Systems:    Review of Systems   Constitutional: Negative for chills and fever.   HENT: Negative for nosebleeds.    Eyes: Negative for discharge.   Respiratory: Negative for hemoptysis, sputum production and stridor.    Cardiovascular: Positive for leg swelling. Negative for chest pain and palpitations.   Gastrointestinal: Negative for blood in stool and diarrhea.   Genitourinary: Negative for dysuria and frequency.   Musculoskeletal: Positive for joint pain.   Skin: Negative for itching.   Neurological: Negative for dizziness and headaches.   Endo/Heme/Allergies: Does not bruise/bleed easily.   Psychiatric/Behavioral: Negative for depression. The patient is not nervous/anxious.    All other systems reviewed and are negative.    Objective Data:   Vitals:   Temp:  [36.1 °C (97 °F)-36.8 °C (98.2 °F)] 36.3 °C (97.3 °F)  Pulse:  [80-83] 80  Resp:  [16-20] 16  BP: (106-143)/(61-84) 137/79  SpO2:  [92 %-95 %] 95 %   93-95 % on room air    Physical Exam:   Physical Exam  Constitutional:       General: He is not in acute distress.  HENT:      Head: Normocephalic and atraumatic.      Right Ear: External ear normal.      Left Ear: External ear normal.      Nose: Nose normal.      Mouth/Throat:       Mouth: Mucous membranes are moist.   Eyes:      Extraocular Movements: Extraocular movements intact.      Pupils: Pupils are equal, round, and reactive to light.   Cardiovascular:      Rate and Rhythm: Normal rate and regular rhythm.      Pulses: Normal pulses.   Pulmonary:      Effort: Pulmonary effort is normal. No respiratory distress.      Breath sounds: No wheezing or rales (Minimal).      Comments: Coarse.  Diminished.  Abdominal:      General: Abdomen is flat. Bowel sounds are normal. There is no distension.      Tenderness: There is no abdominal tenderness. There is no guarding.   Musculoskeletal:         General: Swelling and tenderness (On left knee and right elbow as well as site of cellulitis. ) present. No signs of injury.      Cervical back: Normal range of motion and neck supple.      Right lower leg: Edema present.      Left lower leg: Edema present.      Comments: Pitting edema to bilateral hips   Skin:     General: Skin is warm and dry.      Capillary Refill: Capillary refill takes less than 2 seconds.      Coloration: Skin is not jaundiced.      Findings: No bruising.      Comments:  Bullae, dark discoloration like with persisting erythema, sloughing of skin on right lower extremity, worsening Erythema not extending. Subsiding comparing to previous encounter.    Also dark discoloration of RUE ranging along entire medial forearm   Neurological:      General: No focal deficit present.      Mental Status: He is alert.      Cranial Nerves: No cranial nerve deficit.      Motor: No weakness.   Psychiatric:         Mood and Affect: Mood normal.         Behavior: Behavior normal.         Thought Content: Thought content normal.     Labs:  Recent Labs     02/12/21  0754 02/13/21  0651 02/14/21  0213   WBC 15.3* 12.0* 11.2*   RBC 3.18* 2.97* 3.17*   HEMOGLOBIN 10.2* 9.7* 10.2*   HEMATOCRIT 30.4* 29.1* 31.5*   MCV 95.6 98.0* 99.4*   MCH 32.1 32.7 32.2   RDW 59.4* 61.2* 63.2*   PLATELETCT 242 271 249   MPV  10.8 10.4 10.3   NEUTSPOLYS 77.90* 85.20* 81.70*   LYMPHOCYTES 6.20* 6.10* 7.00*   MONOCYTES 1.80 2.60 1.70   EOSINOPHILS 6.20 0.00 1.70   BASOPHILS 0.00 0.90 0.00   RBCMORPHOLO Present Present Present     Recent Labs     02/12/21  0754 02/13/21  0651 02/14/21  0213   SODIUM 132* 136 129*   POTASSIUM 3.6 3.7 3.7   CHLORIDE 97 100 97   CO2 24 26 24   GLUCOSE 128* 130* 105*   BUN 22 21 19   CPKTOTAL 482*  --   --      Recent Labs     02/12/21 0754 02/13/21  0651 02/14/21  0213   ALBUMIN 2.6* 2.7* 2.7*   TBILIRUBIN 0.6 0.8 0.7   ALKPHOSPHAT 78 70 70   TOTPROTEIN 5.9* 5.7* 6.0   ALTSGPT 22 18 20   ASTSGOT 34 27 23   CREATININE 1.15 1.12 1.03       Micro:  Blood cultures positive on 2/4 with GAS  Blood cultures negative on 2/6  Blood cultures from 02/10 and 2/11 negative.     EKG:  None    Imaging:   CT right leg: Shows extensive edema, cellulitis, superficial fasciitis.  No soft tissue gas.  No osteomyelitis.    Meds:  Current Outpatient Medications   Medication Instructions   • acetaminophen (TYLENOL) 1,000 mg, Oral, EVERY 6 HOURS PRN   • allopurinol (ZYLOPRIM) 100 mg, Oral, EVERY MORNING   • benzonatate (TESSALON) 100 mg, Oral, 3 TIMES DAILY PRN   • cetirizine (ZYRTEC) 10 mg, Oral, EVERY MORNING   • colchicine (COLCRYS) 0.6 mg, Oral, 2 TIMES DAILY   • furosemide (LASIX) 20 mg, Oral, DAILY   • hydrOXYzine HCl (ATARAX) 25 mg, Oral, 3 TIMES DAILY PRN   • lisinopril (PRINIVIL) 2.5 mg, Oral, EVERY EVENING   • metFORMIN (GLUCOPHAGE) 500 mg, Oral, 2 TIMES DAILY WITH MEALS   • metoprolol SR (TOPROL XL) 50 mg, Oral, EVERY MORNING   • tamsulosin (FLOMAX) 0.4 MG capsule TAKE 1 CAPSULE BY MOUTH EVERY DAY   • warfarin (COUMADIN) 2 mg, Oral, SEE ADMIN INSTRUCTIONS, Takes 2 mg every Friday has 5 mg tablets to take all other days   • warfarin (COUMADIN) 5 mg, Oral, SEE ADMIN INSTRUCTIONS, Takes Monday, Tuesday, Wednesday, Thursday, Saturday, and Sunday, has 2 mg tablets to take on Fridays      Scheduled Medications   Medication Dose  Frequency   • enoxaparin (LOVENOX) injection  100 mg Q12HRS   • cefTRIAXone (ROCEPHIN) IV  2 g Q24HRS   • metroNIDAZOLE  500 mg Q8HRS   • lisinopril  2.5 mg Q DAY   • furosemide  40 mg BID DIURETIC   • allopurinol  100 mg QAM   • insulin lispro  0-10 Units TID AC   • metoprolol SR  50 mg Q DAY   • acetaminophen  650 mg TID   • zinc sulfate  220 mg DAILY   • multivitamin  1 tablet DAILY   • [START ON 2/20/2021] amiodarone  200 mg DAILY   • amiodarone  400 mg TWICE DAILY      Consultants/Specialty:  Wound  PT  OT  Cardiology  Infectious disease    Problem Representation:   Morales Olivier is a 65-year-old male, admitted on Feb 4th for 3/4 SIRS criteria, concern for severe sepsis secondary to RLE cellulitis.  Admitted for severe sepsis versus septic shock.  Patient intubated in the ER after failing BiPAP.  Despite history of sick sinus syndrome status post pacemaker, patient found to be in SVT which later progressed to A. fib with RVR, on anticoagulation with warfarin.  Cardiology started amiodarone. The patient was successfully extubated on 2/8.  Now being treated for group A cellulitis with IV antibiotics.  On 2/10 ID broadened ABX to ceftriaxone plus Flagyl till 2/20, with repeat Ct bgative for abscess. CPK downtrending , warfarin switched to lovenox in prep for picc line     * Bacteremia in the setting of Cellulitis of right lower extremity- (present on admission)  Assessment & Plan  Blood cultures 2/4 grew GAS, with repeat blood cultures negative.   ID following.  Recommends 14 days of antibiotics from 2/6- 2/20 with ceftriaxone and Flagyl, as blood cell count has remained high despite adequate IV antibiotics. Cannot  Switch PO abx given bacteremia indication /valve  CT leg negative for abscess.   Blood cultures negative so far from 2/11 (as well as 2/10 and 2/6)  Pain: oxy 5-10   Zofran prn for emesis  Procal downtrended to .30  WBC downtrending, CTM        Picc line decisions pending, CM talking to staff about this         Continue wound care    Right Upper Extremity Swelling and Warmth      U/S ordered of RUE; of note is not suspecting clot formation as on lovenox,         more likely IV infiltrated       IV access and lab draws on LUE only please (if possible)      Picc line decisions pending, CM talking to staff about this but       U/S of RUE indicated cephalic vein thrombus (small, nonocclusive), no DVT    Fluid overload secondary to IV fluids   Assessment & Plan  Resolved  Dc'd daily weights and Is and Os       Becoming hyponatremic, appears euvolemic/hypovolemic in setting of daily lasix thus I will dc this and CTM his labs  Educated on PRN pain meds  Likely secondary to IV fluids given per sepsis protocol previously, Echo Ef of 45%, normal right ventricular function  Blood pressure stable      Outpatient repeat echocardiogram recommended by cardiology in admission       On low dose lisinopril as of 02/11       Atrial fibrillation with RVR ([facility])- (present on admission)  Assessment & Plan  On telemetry, ventricularly paced rhythm   Amiodarone therapy started by cardiology.  Anticoagulation updated from warfarin to lovenox  Patient has pacemaker for sick sinus syndrome  Continue metoprolol XL- uptitrate as needed.      Sepsis ([facility])- (present on admission)  Assessment & Plan  Resolving  Severe Sepsis Present on admission, with respiratory failure and EBEN  Patient met 3/4 SIRS criteria on admission  Admission WBC 28.8 -> now trended down since admission.  Source of infection is left lower extremity cellulitis  Sepsis protocol initiated.  Patient received IV fluids and antibiotics  Patient no longer shows signs of endorgan damage  Echo and KD normal, no vegetation  Continue ceftriaxone and flagyl.   C diff negative    COPD (chronic obstructive pulmonary disease) ([facility])- (present on admission)  Assessment & Plan  Not in exacerbation  18-pack-year history  Patient now on ipratropium nebulizer    Type 2 diabetes mellitus without  complication, without long-term current use of insulin ([facility])- (present on admission)  Assessment & Plan  Recent A1c 7.6  Patient is not on home insulin  SSI lispro  Likely will not need insulin therapy at home    Leukocytosis with bandemia  Assessment & Plan  -See cellulitis plan    Lactic acid acidosis- (present on admission)  Assessment & Plan  Resolved  Secondary to tissue ischemia in setting of severe sepsis.    Paroxysmal SVT (supraventricular tachycardia) ([facility])- (present on admission)  Assessment & Plan  Initially noted to be in supraventricular tachycardia changing to A. fib with RVR.  Refer to afib plan.    Acute respiratory failure with hypoxia ([facility])- (present on admission)  Assessment & Plan  Resolved  Likely combination of sepsis, A. fib RVR  Now saturating 93 to 95% on room air  Incentive spirometer    Gout Flare      -possible that this could be from seeding in setting of bacteremia;when discharges, if this is to return he will need to have joint tapped      -Was previously startedon colchicine and home allopurinol.           Code status: Full code  Diet: Cardiac diabetic  Lines/ tubes/ drains: Condom cath  IV fluids: None  GI ppx: MiraLAX stopped.  DVT ppx: Warfarin  SNF referral placed

## 2021-02-14 NOTE — CARE PLAN
Problem: Venous Thromboembolism (VTW)/Deep Vein Thrombosis (DVT) Prevention:  Goal: Patient will participate in Venous Thrombosis (VTE)/Deep Vein Thrombosis (DVT)Prevention Measures  Outcome: PROGRESSING AS EXPECTED     Problem: Bowel/Gastric:  Goal: Normal bowel function is maintained or improved  Outcome: PROGRESSING AS EXPECTED  Goal: Will not experience complications related to bowel motility  Outcome: PROGRESSING AS EXPECTED     Problem: Knowledge Deficit  Goal: Knowledge of disease process/condition, treatment plan, diagnostic tests, and medications will improve  Outcome: PROGRESSING AS EXPECTED  Goal: Knowledge of the prescribed therapeutic regimen will improve  Outcome: PROGRESSING AS EXPECTED     Problem: Discharge Barriers/Planning  Goal: Patient's continuum of care needs will be met  Outcome: PROGRESSING AS EXPECTED     Problem: Respiratory:  Goal: Respiratory status will improve  Outcome: PROGRESSING AS EXPECTED     Problem: Fluid Volume:  Goal: Will maintain balanced intake and output  Outcome: PROGRESSING AS EXPECTED     Problem: Pain Management  Goal: Pain level will decrease to patient's comfort goal  Description: Pt assessed for pain regularly and medicated PRN per MAR.    Outcome: PROGRESSING AS EXPECTED

## 2021-02-14 NOTE — PROGRESS NOTES
Assumed care of pt @ 1915, bedside report received from OSWALD Gallagher.  Pt A&OX4 and has pain in the R leg. Bed locked and lowered with call light within reach and questions answered during present time.

## 2021-02-14 NOTE — PROGRESS NOTES
Infectious Disease Progress Note    Author: Luciana Ingram M.D. Date & Time of service: 2021  2:33 PM    Chief Complaint:  Septic shock, RLE cellulitits. GAS bacteremia     Interval History:   AF, O2 RA 2 L NC, pressors remain off. States the leg still is painful but improving overall.  He is continued on IV penicillin as below.   AF, O2 2 L NC, ongoing edema and drainage of right leg  2/10 AF, O2 RA, complaining of nausea today, no vomiting and has had some diarrhea.  His right leg continues to have edema, erythema and warmth.  Some increase in WBC today. Patient's antibiotics transition from penicillin to ceftriaxone and Flagyl.    AF, O2 RA, watery bowel movements.  Still with significant edema, erythema warmth and drainage from right leg.  New areas of superficial appearing necrosis on front of shin and some blistering around ankle.  CT with superficial fasciitis, no gas in tissues and no drainable abscess.  She continued on ceftriaxone and Flagyl for now.  C. difficile testing ordered.      AF, O2 RA, right leg swelling and pain with skin necrosis and breakdown, not significantly changed.  New area of bruising and pain on right arm.     Review of Systems:  Review of Systems   Constitutional: Negative for chills, fever and malaise/fatigue.   Respiratory: Negative for cough, sputum production and shortness of breath.    Gastrointestinal: Negative for abdominal pain, diarrhea, nausea and vomiting.   Musculoskeletal: Negative for myalgias.       Hemodynamics:  Temp (24hrs), Av.3 °C (97.4 °F), Min:36.1 °C (97 °F), Max:36.8 °C (98.2 °F)  Temperature: 36.3 °C (97.3 °F)  Pulse  Av.2  Min: 64  Max: 193   Blood Pressure : 137/79       Physical Exam:  Physical Exam  Constitutional:       Appearance: Normal appearance.   Cardiovascular:      Rate and Rhythm: Normal rate and regular rhythm.      Heart sounds: Murmur heard.     Pulmonary:      Effort: Pulmonary effort is normal.      Breath  sounds: Normal breath sounds.   Abdominal:      General: Abdomen is flat. Bowel sounds are normal.      Palpations: Abdomen is soft.   Musculoskeletal:         General: Normal range of motion.   Skin:     General: Skin is warm and dry.   Neurological:      General: No focal deficit present.      Mental Status: He is alert and oriented to person, place, and time.   Psychiatric:         Mood and Affect: Mood normal.         Behavior: Behavior normal.         Meds:    Current Facility-Administered Medications:   •  enoxaparin (LOVENOX) injection  •  cefTRIAXone (ROCEPHIN) IV  •  metroNIDAZOLE  •  lisinopril  •  ipratropium  •  colchicine  •  allopurinol  •  [DISCONTINUED] insulin regular **AND** POC Blood Glucose **AND** NOTIFY MD and PharmD **AND** dextrose 50%  •  insulin lispro  •  metoprolol SR  •  acetaminophen  •  zinc sulfate  •  multivitamin  •  [START ON 2/20/2021] amiodarone  •  amiodarone  •  acetaminophen  •  oxyCODONE immediate-release  •  morphine injection  •  Respiratory Therapy Consult  •  labetalol  •  ondansetron  •  ondansetron    Labs:  Recent Labs     02/12/21 0754 02/13/21  0651 02/14/21 0213   WBC 15.3* 12.0* 11.2*   RBC 3.18* 2.97* 3.17*   HEMOGLOBIN 10.2* 9.7* 10.2*   HEMATOCRIT 30.4* 29.1* 31.5*   MCV 95.6 98.0* 99.4*   MCH 32.1 32.7 32.2   RDW 59.4* 61.2* 63.2*   PLATELETCT 242 271 249   MPV 10.8 10.4 10.3   NEUTSPOLYS 77.90* 85.20* 81.70*   LYMPHOCYTES 6.20* 6.10* 7.00*   MONOCYTES 1.80 2.60 1.70   EOSINOPHILS 6.20 0.00 1.70   BASOPHILS 0.00 0.90 0.00   RBCMORPHOLO Present Present Present     Recent Labs     02/12/21  0754 02/13/21  0651 02/14/21 0213   SODIUM 132* 136 129*   POTASSIUM 3.6 3.7 3.7   CHLORIDE 97 100 97   CO2 24 26 24   GLUCOSE 128* 130* 105*   BUN 22 21 19   CPKTOTAL 482*  --   --      Recent Labs     02/12/21  0754 02/13/21  0651 02/14/21  0213   ALBUMIN 2.6* 2.7* 2.7*   TBILIRUBIN 0.6 0.8 0.7   ALKPHOSPHAT 78 70 70   TOTPROTEIN 5.9* 5.7* 6.0   ALTSGPT 22 18 20   ASTSGOT  34 27 23   CREATININE 1.15 1.12 1.03       Imaging:  CT-EXTREMITY, LOWER WITH RIGHT    Result Date: 2/10/2021  2/10/2021 4:21 PM HISTORY/REASON FOR EXAM:  Soft tissue infection suspected, femur, initial exam. TECHNIQUE/EXAM DESCRIPTION AND NUMBER OF VIEWS:  CT scan of the RIGHT lower extremity with contrast, with reconstructions. Thin helical 3 mm sections were obtained from the distal femur through the proximal tibia/fibula. Sagittal and coronal multiplanar reconstructions were generated from the axial images. A total of 100 mL of Omnipaque 350 nonionic contrast was administered  IV without complication. Up to date radiation dose reduction adjustments have been utilized to meet ALARA standards for radiation dose reduction. COMPARISON: 12/20/2019 FINDINGS: There is relatively diffuse right lower extremity skin thickening, subcutaneous fat infiltration which could be related to edema and/or cellulitis and superficial fasciitis. There is no soft tissue gas suggests a necrotizing infection. No focal fluid collection. Mildly prominent right groin lymph node is probably reactive. Please note that the foot is incompletely visualized on this study. There are degenerative changes seen within the mid and hindfoot. Degenerative changes of the knee. There is no CT evidence for osteomyelitis. Small bilateral fat-containing inguinal hernias. There is diffuse atherosclerosis.     Extensive right lower extremity edema and/or cellulitis and superficial fasciitis. No soft tissue gas. No evidence of drainable abscess. No CT evidence of osteomyelitis. Atherosclerosis.    DX-CHEST-LIMITED (1 VIEW)    Result Date: 2/5/2021 2/5/2021 5:29 PM HISTORY/REASON FOR EXAM:  CL placement TECHNIQUE/EXAM DESCRIPTION AND NUMBER OF VIEWS: Single AP view of the chest. COMPARISON: 2/5/2021 FINDINGS: Right central line projects over the SVC. Enteric tube passes below the level of the diaphragm. Endotracheal tube projects at the level of the aortic  arch. There is right-sided pacer. Sternotomy wires are seen. The cardiomediastinal silhouette is stable.  There is blunting of the right costophrenic angle. There is hazy right basilar opacity which is unchanged. No pneumothorax is identified.     Right central line projects over the SVC. No pneumothorax. Small right pleural effusion with hazy right basilar opacity which may represent atelectasis or pneumonitis. Stable prominence of the cardiomediastinal silhouette.     DX-CHEST-PORTABLE (1 VIEW)    Result Date: 2/8/2021 2/7/2021 11:30 PM HISTORY/REASON FOR EXAM:  For indication of respiratory failure; For indication of respiratory failure TECHNIQUE/EXAM DESCRIPTION AND NUMBER OF VIEWS: Single portable view of the chest. COMPARISON: Study from earlier the same date at 6:24 AM FINDINGS: Hardware is stably positioned in its visualized extent. HEART: Stable size. LUNGS: Lung volumes are low. There are perihilar opacities. There are bibasilar opacities. PLEURA: Neither costophrenic sulcus is well seen     1.  Increased BILATERAL atelectasis which could obscure an additional process such as pulmonary edema or pneumonia 2.  Possible BILATERAL pleural effusions    DX-CHEST-PORTABLE (1 VIEW)    Result Date: 2/7/2021 2/7/2021 6:12 AM HISTORY/REASON FOR EXAM:  For indication of respiratory failure; For indication of respiratory failure TECHNIQUE/EXAM DESCRIPTION AND NUMBER OF VIEWS: Single portable view of the chest. COMPARISON: Yesterday FINDINGS: The cardiomediastinal silhouettes are stable. There are ongoing bibasilar opacities, most likely atelectasis, worse on the right, as well as a small right pleural effusion. Endotracheal tube is no longer present. Right central venous line is unchanged in  position.     Stable bibasilar opacities, right greater than left.    DX-CHEST-PORTABLE (1 VIEW)    Result Date: 2/6/2021 2/6/2021 5:18 AM HISTORY/REASON FOR EXAM:  For indication of respiratory failure; For indication of  respiratory failure TECHNIQUE/EXAM DESCRIPTION AND NUMBER OF VIEWS: Single portable view of the chest. COMPARISON: Yesterday FINDINGS: The cardiomediastinal silhouettes are stable. There is ongoing bibasilar atelectasis, mainly on the right, but with a small right pleural effusion. Support devices are positionally unchanged.     Stable chest findings compared to 2/5.    DX-CHEST-PORTABLE (1 VIEW)    Result Date: 2/5/2021 2/5/2021 5:22 AM HISTORY/REASON FOR EXAM:  For indication of respiratory failure; For indication of respiratory failure TECHNIQUE/EXAM DESCRIPTION AND NUMBER OF VIEWS: Single portable view of the chest. COMPARISON: Yesterday FINDINGS: Cardiac size is unchanged. Interstitial opacities persist in the mid and lower lung fields bilaterally, along with right basilar atelectasis and probable small right pleural effusion. Endotracheal tube is positionally unchanged.     Ongoing findings consistent with mild pulmonary edema, with right basilar atelectasis and small right pleural effusion. No interval change.    DX-CHEST-PORTABLE (1 VIEW)    Result Date: 2/4/2021 2/4/2021 7:55 PM HISTORY/REASON FOR EXAM:  Endotracheal tube placement TECHNIQUE/EXAM DESCRIPTION AND NUMBER OF VIEWS: Single portable view of the chest. COMPARISON: Prior image today at 1643 hours FINDINGS: An endotracheal tube is present, the tip of which is 2 to 3 cm above the corina. Overall cardiopulmonary appearance is unchanged.     Endotracheal tube in place as noted above. Stable cardiopulmonary appearance.    DX-CHEST-PORTABLE (1 VIEW)    Result Date: 2/4/2021 2/4/2021 4:38 PM HISTORY/REASON FOR EXAM:  possible sepsis.. Shortness of breath TECHNIQUE/EXAM DESCRIPTION AND NUMBER OF VIEWS: Single portable view of the chest. COMPARISON: December 17, 2020 FINDINGS: Patient is status post cardiac surgery. Single-lead right-sided pacemaker present. External pacer pads are present. There is right pleural fluid. There is vascular congestion. No  pneumothorax.     Enlarged cardiac silhouette status post cardiac surgery with small right-sided pleural effusion and vascular congestion.    US-EXTREMITY VENOUS LOWER BILAT    Result Date: 2021   Vascular Laboratory  CONCLUSIONS  No superficial or deep venous thrombosis.  AILYN MCINTOSH  Exam Date:     2021 09:32  Room #:     Inpatient  Priority:     Stat  Ht (in):             Wt (lb):  Ordering Physician:        JO ANN GARCIA  Referring Physician:       [id number] Diamond  Sonographer:               Gladys Clark RVSHAYE  Study Type:                Complete Bilateral  Technical Quality:         Adequate  Age:    65    Gender:     M  MRN:    [id number]  :    1955      BSA:  Indications:     Edema, unspecified, Localized swelling, mass and lump,                   unspecified lower limb  CPT Codes:       03339  ICD Codes:       R60.9  R22.40  History:         Bilateral lower extremity swelling/edema. Prior study done                   12/15/20.  Limitations:  PROCEDURES:  Bilateral lower extremity venous duplex imaging.  The following venous structures were evaluated: common femoral, profunda  femoral, greater saphenous, femoral, popliteal , peroneal and posterior  tibial veins.  Serial compression, augmentation maneuvers,  color and spectral Doppler  flow evaluations were performed.  FINDINGS:  Bilateral lower extremities -  No superficial or deep venous thrombosis.  Complete color filling and compressibility with normal venous flow dynamics  including spontaneous flow, response to augmentation maneuvers, and  respiratory phasicity.  The peroneal and posterior tibial veins are difficult to assess for  compressibility, but flow response to augmentation is demonstrated.  Alo Mohamud MD  (Electronically Signed)  Final Date:      2021                   09:54    US-EXTREMITY VENOUS UPPER UNILAT RIGHT    Result Date: 2021   Upper Extremity  Venous Duplex Report  Vascular Laboratory   CONCLUSIONS  A small non occlusive superficial thrombus is visualized in the cephalic  vein at the proximal forearm.  AILYN MCINTOSH  Exam Date:     2021 22:34  Room #:     Inpatient  Priority:     Routine  Ht (in):             Wt (lb):  Ordering Physician:        GEORGE SWAN  Referring Physician:       [id number] Coyle  Sonographer:               Rommel Gonzalez RDMS, RVT  Study Type:                Complete Unilateral  Technical Quality:         Adequate  Age:    65    Gender:     M  MRN:    [id number]  :    1955      BSA:  Indications:     Swelling of Limb, Edema  CPT Codes:       31684  ICD Codes:       729.81  782.3  History:         Right upper extremity swelling/edema and extensive bruising.                    No priors.  Limitations:  PROCEDURES:  Right upper extremity venous duplex imaging.  The following venous structures were evaluated: internal jugular,  subclavian, axillary, brachial, cephalic and basilic veins.  Serial compression, augmentation maneuvers,  color and spectral Doppler  flow evaluations were performed.  FINDINGS:  Right upper extremity.  A small non occlusive thrombus is visualized in the cephalic vein at the  proximal forearm.  No evidence of deep venous thrombosis.  Flow was evaluated in the contralateral subclavian vein and normal venous  flow dynamics including spontaneous flow, respiratory phasic variation and  augmentation were demonstrated.  Evelio Rojo MD  (Electronically Signed)  Final Date:      2021                   04:42    EC-ECHOCARDIOGRAM COMPLETE W/O CONT    Result Date: 2021  Transthoracic Echo Report Echocardiography Laboratory CONCLUSIONS Mildly depressed left ventricular function.  Left ventricular ejection fraction is visually estimated to be 45%, likely affected by rapid heart rate. Endocardial borders not well visualized to accurate assess wall motion abnormalities. Recommend repeating limited study with  contrast. The right ventricle was grossly normal in size and function. Severely dilated left atrium. Known mitral valve bioprosthesis which is functioning normally, transvalvular gradient is 6.5 mmHg @. Moderate tricuspid regurgitation. Pleural effusion noted. Compared to the study done on 2020, LVEF is measured lower, likely affected by rapid heart rate. Otherwise, no significant changes. AILYN MCINTOSH Exam Date:         2021                    20:52 Exam Location:     Inpatient Priority:          Routine Ordering Physician:        FIORDALIZA FERGUSON                             ([id number]) Referring Physician:       MARTY Theodore Sonographer:               Jasbir Alston [facility] Age:    65     Gender:    M MRN:    [id number] :    1955 BSA:    2.29   Ht (in):    70     Wt (lb):    250 Exam Type:     Complete Indications:     Persistent atrial fibrillation ICD Codes:       I48.1 CPT Codes:       45809 BP:   94     /   66     HR: Technical Quality:       Poor MEASUREMENTS  (Male / Female) Normal Values 2D ECHO LV Diastolic Diameter PLAX        4.6 cm                4.2 - 5.9 / 3.9 - 5.3 cm LV Systolic Diameter PLAX         2.9 cm                2.1 - 4.0 cm IVS Diastolic Thickness           1.2 cm                LVPW Diastolic Thickness          1.3 cm                LVOT Diameter                     2.2 cm                LV Ejection Fraction MOD BP       54.8 %                >= 55  % LV Ejection Fraction MOD 4C       48.2 %                LV Ejection Fraction MOD 2C       54.3 %                IVC Diameter                      2.1 cm                M-MODE Aortic Root Diameter MM           4.3 cm                DOPPLER AV Peak Velocity                  1.1 m/s               AV Peak Gradient                  5.1 mmHg              AV Mean Gradient                  3.1 mmHg              LVOT Peak Velocity                0.85 m/s              AV Area Cont Eq vti               2.7 cm2               MV Velocity  Time Integral         35 cm                 TR Peak Velocity                  249 cm/s              PV Peak Velocity                  0.99 m/s              PV Peak Gradient                  3.9 mmHg              RVOT Peak Velocity                0.82 m/s              * Indicates values subject to auto-interpretation LV EF:        % FINDINGS Left Ventricle Normal left ventricular size.  Mild concentric left ventricular hypertrophy.  Mildly depressed left ventricular function.  Left ventricular ejection fraction is visually estimated to be 45%. Endocardial borders not well visualized to accurate assess wall motion abnormalities.  Diastolic function indeterminate due to mitral valve replacement and atrial fibrillation. Right Ventricle The right ventricle was grossly normal in size and function. Right Atrium Enlarged right atrium. Left Atrium Severely dilated left atrium. Left atrial volume index is 80  mL/sq m. Mitral Valve Known mitral valve bioprosthesis which is functioning normally, transvalvular gradient is 6.5 mmHg @120. Aortic Valve Structurally normal aortic valve without significant stenosis or regurgitation. Tricuspid Valve Structurally normal tricuspid valve. No tricuspid stenosis. Moderate tricuspid regurgitation. Estimated right ventricular systolic pressure  is 35 mmHg. Pulmonic Valve Structurally normal pulmonic valve without significant stenosis or regurgitation. Pericardium No pericardial effusion seen. Pleural effusion noted. Aorta The aortic root is normal. Yogesh Galindo MD (Electronically Signed) Final Date:     04 February 2021                 22:48    EC-KD W/O CONT    Result Date: 2/6/2021  Transesophageal Echo Report Echocardiography Laboratory CONCLUSIONS No evidence of endocarditis. Mildly reduced left ventricular systolic function. Left ventricular ejection fraction is visually estimated to be 45%. Normal right ventricular systolic function. The left atrium is surgically absent. Known mitral  valve bioprosthesis which is functioning normally with appropriate transvalvular gradient. Structurally normal tricuspid valve without significant stenosis Moderate tricuspid regurgitation. Estimated right ventricular systolic pressure  is 35 mmHg. AILYN MCINTOSH Exam Date:          2021                     11:02 Exam Location:      Inpatient Priority:            Routine Ordering Physician:        JACK CEDENO  ([id number]) Referring Physician:       RADHA Kinney Sonographer:               Yonis Wilder                            RDMARCO Age:    65     Gender:    M MRN:    [id number] :    1955 BSA:    2.31   Ht (in):    70     Wt (lb):    254 Report Type:      Complete Indications:     Endocarditis and heart valve disorders in diseases                  classified elsewhere ICD Codes:       I39 CPT Codes:       70738 BP:   107    /   77     HR: Technical Quality:       Good MEASUREMENTS  (Male / Female) Normal Values 2D ECHO Estimated LV Ejection Fraction    45 %                  * Indicates values subject to auto-interpretation LV EF:  45    % Medications The patient was sedated on a ventilator. An additional 2 mg IV versed was given, I personally supervised moderate sedation with the bedside ICU nurse from 11:05 to 11:16 AM Limitations none Complications none Proc. Components The probe was inserted and manipulated by Dr. Cedeno. Probe # [id number]  was used for this procedure. FINDINGS Left Ventricle Mildly reduced left ventricular systolic function. Left ventricular ejection fraction is visually estimated to be 45%. Right Ventricle Moderately dilated right ventricle. Normal right ventricular systolic function.  Pacer wire seen in right ventricle. Right Atrium The right atrium is normal in size.  Pacemaker wire present in the right atrial cavity. Left Atrium The left atrium is surgically absent. LA Appendage Normal left atrial appendage. IA Septum The interatrial septum is  "normal. IV Septum The interventricular septum is normal. Mitral Valve Known mitral valve bioprosthesis which is functioning normally with appropriate transvalvular gradient. No valvular vegetations. Aortic Valve Structurally normal aortic valve without significant stenosis or regurgitation.  No valvular vegetations. Tricuspid Valve Structurally normal tricuspid valve without significant stenosis Moderate tricuspid regurgitation. Estimated right ventricular systolic pressure  is 35 mmHg.   No valvular vegetations. Pulmonic Valve Structurally normal pulmonic valve without significant stenosis or regurgitation. Pericardium Normal pericardium without effusion. Aorta The aortic root is normal. Josafat Cedeno MD (Electronically Signed) Final Date:     06 February 2021                 13:06      Micro:  Results     Procedure Component Value Units Date/Time    BLOOD CULTURE [[id number]] Collected: 02/06/21 2057    Order Status: Completed Specimen: Blood from Peripheral Updated: 02/11/21 2300     Significant Indicator NEG     Source BLD     Site PERIPHERAL     Culture Result No growth after 5 days of incubation.    Narrative:      Collected By:[id number] PRINCE CAS SKY  Per Hospital Policy: Only change Specimen Src: to \"Line\" if  specified by physician order.  Right Forearm/Arm    BLOOD CULTURE [[id number]] Collected: 02/06/21 2043    Order Status: Completed Specimen: Blood from Peripheral Updated: 02/11/21 2300     Significant Indicator NEG     Source BLD     Site PERIPHERAL     Culture Result No growth after 5 days of incubation.    Narrative:      Collected By:[id number] PRINCE CAS SKY  Per Hospital Policy: Only change Specimen Src: to \"Line\" if  specified by physician order.  Left Forearm/Arm    C Diff by PCR rflx Toxin [[id number]] Collected: 02/11/21 1548    Order Status: Completed Specimen: Stool Updated: 02/11/21 1728     C Diff by PCR Negative     Comment: C. difficile NOT detected by PCR.  Treatment not " "indicated per guidelines.  Repeat testing not indicated within 7 days.          027-NAP1-BI Presumptive Negative     Comment: Presumptive 027/NAP1/BI target DNA sequences are NOT DETECTED.       Narrative:      Special Contact [id number] YENNY COTTO  Does this patient have risk factors for C-diff?->Yes    BLOOD CULTURE [[id number]] Collected: 02/10/21 1050    Order Status: Completed Specimen: Blood from Peripheral Updated: 02/11/21 0707     Significant Indicator NEG     Source BLD     Site PERIPHERAL     Culture Result No Growth  Note: Blood cultures are incubated for 5 days and  are monitored continuously.Positive blood cultures  are called to the RN and reported as soon as  they are identified.      Narrative:      Per Hospital Policy: Only change Specimen Src: to \"Line\" if  specified by physician order.  Left Hand    BLOOD CULTURE [[id number]] Collected: 02/10/21 1050    Order Status: Completed Specimen: Blood from Peripheral Updated: 02/11/21 0648     Significant Indicator NEG     Source BLD     Site PERIPHERAL     Culture Result No Growth  Note: Blood cultures are incubated for 5 days and  are monitored continuously.Positive blood cultures  are called to the RN and reported as soon as  they are identified.      Narrative:      Per Hospital Policy: Only change Specimen Src: to \"Line\" if  specified by physician order.  No site indicated    C Diff by PCR rflx Toxin [[id number]]     Order Status: Canceled Specimen: Stool     Blood Culture [[id number]] Collected: 02/04/21 1757    Order Status: Completed Specimen: Blood from Peripheral Updated: 02/09/21 2101     Significant Indicator NEG     Source BLD     Site PERIPHERAL     Culture Result No growth after 5 days of incubation.    Narrative:      From different peripheral sites, if not done within the last  24 hours (Per Hospital Policy: Only change specimen source to  \"Line\" if specified by physician order)  No site indicated          Assessment:  Active Hospital " Problems    Diagnosis    • *Cellulitis of right lower extremity [L03.115]    • Fluid overload secondary to IV fluids  [E87.70]    • Atrial fibrillation with RVR ([facility]) [I48.91]    • Sepsis ([facility]) [A41.9]    • COPD (chronic obstructive pulmonary disease) ([facility]) [J44.9]    • Type 2 diabetes mellitus without complication, without long-term current use of insulin ([facility]) [E11.9]    • Lactic acid acidosis [E87.2]    • Acute respiratory failure with hypoxia ([facility]) [J96.01]    • Paroxysmal SVT (supraventricular tachycardia) ([facility]) [I47.1]    • Leukocytosis with bandemia [D72.825]      Interval 24 hours:      AF, O2 RA  Labs reviewed  Imaging personally reviewed both images and report.   Micro reviewed    Pt with less leg pain and edema.  Abx continued as below.     Assessment:    65 y.o. obese man with a history of a bioprosthetic mitral valve replacement in March 2017, type 2 diabetes mellitus, pacemaker placement in July 2014, coronary artery disease and COPD admitted after being found down.  Patient admitted for septic shock secondary to severe right lower extremity cellulitis with group A streptococcal bacteremia.  Blood culture on 2/4 (1 out of 2 sets) + group A streptococcus.  Repeat blood cultures on 2/6 remain negative to date.  KD without any evidence of prosthetic valve or native valve endocarditis.     Septic shock, possible toxic shock syndrome, resolved     Group A streptococcus bacteremia.  Source right lower extremity cellulitis in setting of prior MVR and ICD  -KD on 2/6 with no vegetations noted on valves or pacemaker wires  Right lower extremity cellulitis, improved   Acute kidney injury, improving  Leukocytosis, ongoing, improved today  Bioprosthetic mitral valve replacement  Type 2 diabetes mellitus  Diarrhea/nausea -improved  -If still tested negative on 2/11  Right arm bruising, swelling, warmth and tenderness- Superficial thrombus per US, no DVT       Plan:   --Continue ceftriaxone, will stop Flagyl, no  oral options are available given the bacteremia in the setting of prosthetic valve  --- Antticipate a 14-day course of antibiotics from 2/6.  Stop date 2/20/2021  ----Follow-up repeat blood cultures -no growth to date   --- Patient will likely need long-term wound care given the extent of breakdown on the right lower leg     Discussed with primary team, Dr. Ponce. ID will sign off.

## 2021-02-15 NOTE — CARE PLAN
Problem: Bowel/Gastric:  Goal: Normal bowel function is maintained or improved  Outcome: PROGRESSING AS EXPECTED  Intervention: Educate patient and significant other/support system about diet, fluid intake, medications and activity to promote bowel function  Note: Complete  Intervention: Educate patient and significant other/support system about signs and symptoms of constipation and interventions to implement  Note: Complete      Problem: Knowledge Deficit  Goal: Knowledge of the prescribed therapeutic regimen will improve  Outcome: PROGRESSING AS EXPECTED  Note: Complete

## 2021-02-15 NOTE — WOUND TEAM
[facility]  Inpatient Services  Wound and Skin Care Progress Note    Admission Date: 2021     Last order of IP CONSULT TO WOUND CARE was found on 2021 from Hospital Encounter on 2021     HPI, PMH, SH: Reviewed    Past Surgical History:   Procedure Laterality Date   • MITRAL VALVE REPLACE  3/8/2017    Procedure: MITRAL VALVE REPLACE-REDO;  Surgeon: Cornelia Wright M.D.;  Location: SURGERY [facility];  Service:    • KD  3/8/2017    Procedure: KD;  Surgeon: Cornelia Wright M.D.;  Location: SURGERY [facility];  Service:    • IRRIGATION & DEBRIDEMENT GENERAL  2009    Performed by MICHEL URBINA at SURGERY [facility]   • WIDE EXCISION  2009    Performed by MICHEL URBINA at [facility]   • ANGIOGRAM  2009    Performed by MICHEL URBINA at [facility]   • CATH REMOVAL  2009    Performed by MICHEL URBINA at [facility]   • THROMBECTOMY  2009    Performed by MICHEL URBINA at SURGERY [facility]   • EMBOLECTOMY  2009    Performed by MICHEL URBINA at SURGERY [facility]   • ANGIOGRAM  7/3/2009    Performed by MORGAN WARD at SURGERY [facility]   • MITRAL VALVE REPLACE  3/14/08    Performed by CORNELIA WRIGHT at SURGERY [facility]   • MAZE PROCEDURE  3/14/08    Performed by CORNELIA WRIGHT at [facility]   • OTHER CARDIAC SURGERY  2008    mitral valve replacement   • AORTIC VALVE REPLACEMENT     • OTHER ABDOMINAL SURGERY      imbulica repair      Social History     Tobacco Use   • Smoking status: Former Smoker     Packs/day: 2.50     Years: 9.00     Pack years: 22.50     Types: Cigarettes     Quit date: 1981     Years since quittin.1   • Smokeless tobacco: Never Used   Substance Use Topics   • Alcohol use: No     Chief Complaint   Patient presents with   • Chest Pain   • Shortness of Breath   • Fall Less than 10 Feet     down for 2-4 days at an [facility].           Diagnosis: Acute respiratory failure with hypoxia (HCC) [J96.01]  Cellulitis and abscess of right leg [L03.115, L02.415]    Unit where seen by Wound Team: T816/01     WOUND CONSULT/FOLLOW UP RELATED TO:  Right LE circumferential follow-up    WOUND HISTORY:  Unknown etiology.  Admitted with cellulitis, patient was found down for 2-4 days at [facility].     WOUND ASSESSMENT/LDA     Wound 02/08/21 Soft Tissue Necrosis Leg Right cellulitis/vasculitis?  (Active)   Wound Image    02/15/21 0900   Site Assessment Red;Painful 02/15/21 0900   Periwound Assessment Red 02/15/21 0900   Margins Undefined edges 02/15/21 0900   Closure Secondary intention 02/15/21 0900   Drainage Amount Scant 02/15/21 0900   Drainage Description Serosanguineous 02/15/21 0900   Treatments Cleansed;Site care;Offloading 02/15/21 0900   Wound Cleansing Approved Wound Cleanser 02/15/21 0900   Periwound Protectant Not Applicable 02/15/21 0900   Dressing Cleansing/Solutions Not Applicable 02/15/21 0900   Dressing Options Petrolatum Gauze (yellow);Absorbent Abdominal Pad;Dry Roll Gauze 02/15/21 0900   Dressing Changed Changed 02/15/21 0900   Dressing Status Clean;Dry;Intact 02/15/21 0900   Dressing Change/Treatment Frequency Every Shift, and As Needed 02/15/21 0900   NEXT Dressing Change/Treatment Date 02/15/21 02/15/21 0900   NEXT Weekly Photo (Inpatient Only) 02/22/21 02/15/21 0900   Non-staged Wound Description Partial thickness 02/15/21 0900   Tunneling (cm) 0 cm 02/11/21 1301   Wound Odor None 02/15/21 0900   Pulses Right;1+;DP;PT 02/15/21 0900   Exposed Structures None 02/15/21 0900   WOUND NURSE ONLY - Time Spent with Patient (mins) 40 02/15/21 0900     Vascular:    MARJORIE:   No results found.    Lab Values:    Lab Results   Component Value Date/Time    WBC 12.1 (H) 02/15/2021 03:01 AM    RBC 3.16 (L) 02/15/2021 03:01 AM    HEMOGLOBIN 10.2 (L) 02/15/2021 03:01 AM    HEMATOCRIT 31.1 (L) 02/15/2021 03:01 AM    CREACTPROT 3.47 (H) 12/19/2019 12:26 PM     BAILEE 65 (H) 02/04/2021 09:40 PM    HBA1C 7.6 (H) 11/20/2020 09:41 AM        Culture Results show:  No results found for this or any previous visit (from the past 720 hour(s)).    Pain Level/Medicated:  Pain with cleansing of leg.  Tolerated without pain medication.         INTERVENTIONS BY WOUND TEAM:  Chart and images reviewed. Discussed with bedside RN. This RN in to assess patient. Performed standard wound care which includes appropriate positioning, dressing removal and non-selective debridement. Pictures and measurements obtained weekly if/when required.  Preparation for Dressing removal: NA  Cleansed with:  wound cleanser and gauze.  Sharp debridement: NA  [place] wound: Cleansed with wound cleanser, Prepped with skin prep   Primary Dressing: xeroform   Secondary (Outer) Dressing: ABD and roll gauze     Interdisciplinary consultation: Patient, Bedside RN    EVALUATION / RATIONALE FOR TREATMENT:  Most Recent Date:  02/15/2021: Continue with xeroform, wounds are continuing to evolve.   02/08: admitted with  Cellulitis and RLE abscess.  May benefit from further imaging to see extent of abscess/fluid accumulation.  Xeroform applied for bacteriostatic and non adherent.  ABD and roll gauze for absorption.         Goals: Steady decrease in wound area and depth weekly.    WOUND TEAM PLAN OF CARE ([X] for frequency of wound follow up,):   Nursing to follow orders written for wound care. Contact wound team if area fails to progress, deteriorates or with any questions/concerns  Dressing changes by wound team:                   Follow up 3 times weekly:                NPWT change 3 times weekly:     Follow up 1-2 times weekly:    X  Follow up Bi-Monthly:                   Follow up as needed:     Other (explain):     NURSING PLAN OF CARE ORDERS (X):  Dressing changes: See Dressing Care orders: X  Skin care: See Skin Care orders:   RN Prevention Protocol: X  Rectal tube care: See Rectal Tube Care orders:   Other  orders:    Anticipated discharge plans: TBA wound benefit from wound care post DC   LTACH:        SNF/Rehab:                  Home Health Care:           Outpatient Wound Center:            Self/Family Care:        Other:

## 2021-02-15 NOTE — THERAPY
"Physical Therapy   Daily Treatment     Patient Name: Morales Olivier  Age:  65 y.o., Sex:  male  Medical Record #: [id number]  Today's Date: 2/15/2021     Precautions: Fall Risk    Assessment    Pt progressing well w/ therapy. Pt able to perform mobility faster this session than previous sessions. He has to sit for long periods of time after sitting up d/t increased dizziness and nausea. Pt was able to perform pre gait activities and progress to short distance gait. He required a lot of FWW management during gait and kept pushing it really far forward. Pt kept stating \"I hope I don't fall\" even when steady while standing.    Plan    Continue current treatment plan.    DC Equipment Recommendations: Unable to determine at this time  Discharge Recommendations: Recommend post-acute placement for additional physical therapy services prior to discharge home      Subjective    \"I just can't sleep here.\"     Objective       02/15/21 0851   Precautions   Precautions Fall Risk   Comments R LE wounds   Gait Analysis   Gait Level Of Assist Minimal Assist   Assistive Device Front Wheel Walker   Distance (Feet) 5   # of Times Distance was Traveled 3   Deviation Increased Base Of Support;Bradykinetic;Shuffled Gait   Comments Pt needing cues to keep FWW closer. Pt continually stating \"I hope I don't fall.\"   Bed Mobility    Supine to Sit Minimal Assist   Sit to Supine   (NT up in chair)   Scooting Supervised   Rolling Supervised   Comments Pt needing a little assist w/ trunk.   Functional Mobility   Sit to Stand Minimal Assist   Bed, Chair, Wheelchair Transfer Minimal Assist   Transfer Method Other (Comments)  (Ambulating)   Mobility With FWW   Short Term Goals    Short Term Goal # 1 Pt will perform supine<>sit from flat HOB/no railing with supervision within 6 visits to ensure independnet mobility at home.    Goal Outcome # 1 goal not met   Short Term Goal # 2 Pt will perform sit<>stand with FWW and supervision within 6 visits to " ensure independent moblity at home.    Goal Outcome # 2 Goal not met   Short Term Goal # 3 Pt will ambulate x 150ft with FWW and supervision within 6 visits to ensure independent mobility at home.    Goal Outcome # 3 Goal not met

## 2021-02-15 NOTE — CARE PLAN
Problem: Pain Management  Goal: Pain level will decrease to patient's comfort goal  Description: Pt assessed for pain regularly and medicated PRN per MAR.    Outcome: PROGRESSING AS EXPECTED  Flowsheets (Taken 2/15/2021 1148)  Comfort Goal: Comfort with Movement  Non Verbal Scale:   Calm   Unlabored Breathing  Pain Rating Scale (NPRS): 8  Note:  Per patient report current pain interventions are helping keep pain controlled with continue to monitor pain status and  response to ordered pain interventions.      Problem: Mobility  Goal: Risk for activity intolerance will decrease  Outcome: PROGRESSING AS EXPECTED  Note:  Patient worked with therapy this AM in room , who stated he did well.  Patient was able to transfer from bed to chair  and is currently sitting up in chair, tolerating well.

## 2021-02-15 NOTE — PROGRESS NOTES
Daily Progress Note:     Date of Service: 2/15/2021  Primary Team: [facility] IM Yellow Team   Attending: Dr. Ponce  Senior Resident: Dr. Clifford  Contact:  [phone]    Chief Complaint: Fall    Subjective:  Patient c/o RUE pain, was having nausea after abx, he is also fatigued (did not sleep well)    Interval update:   Midline being placed today  Lovenox >coumadin bridge to follow  Precautions to maximize sleep (I.e. no 3 am blood draws or VS checks), window open in daytime  Warm compresses for RUE      Review of Systems:    Review of Systems   Constitutional: Negative for chills and fever.   HENT: Negative for nosebleeds.    Eyes: Negative for discharge.   Respiratory: Negative for hemoptysis, sputum production and stridor.    Cardiovascular: Negative for chest pain, palpitations and leg swelling.   Gastrointestinal: Positive for nausea. Negative for blood in stool and diarrhea.   Genitourinary: Negative for dysuria and frequency.   Musculoskeletal: Negative for joint pain and neck pain.        RUE pain   Skin: Negative for itching.   Neurological: Negative for dizziness and headaches.   Endo/Heme/Allergies: Does not bruise/bleed easily.   Psychiatric/Behavioral: Negative for depression. The patient is not nervous/anxious.    All other systems reviewed and are negative.    Objective Data:   Vitals:   Temp:  [36.1 °C (97 °F)-36.3 °C (97.4 °F)] 36.1 °C (97 °F)  Pulse:  [80-83] 80  Resp:  [18] 18  BP: (109-128)/(74-85) 109/74  SpO2:  [92 %-95 %] 95 %   93-95 % on room air    Physical Exam:   Physical Exam  Constitutional:       General: He is not in acute distress.  HENT:      Head: Normocephalic and atraumatic.      Right Ear: External ear normal.      Left Ear: External ear normal.      Nose: Nose normal.      Mouth/Throat:      Mouth: Mucous membranes are moist.   Eyes:      Extraocular Movements: Extraocular movements intact.      Pupils: Pupils are equal, round, and reactive to light.   Cardiovascular:      Rate  and Rhythm: Normal rate and regular rhythm.      Pulses: Normal pulses.   Pulmonary:      Effort: Pulmonary effort is normal. No respiratory distress.      Breath sounds: No wheezing or rales (Minimal).      Comments: Coarse.  Diminished.  Abdominal:      General: Abdomen is flat. Bowel sounds are normal. There is no distension.      Tenderness: There is no abdominal tenderness. There is no guarding.   Musculoskeletal:         General: Swelling and tenderness (On left knee and right elbow as well as site of cellulitis. ) present. No signs of injury.      Cervical back: Normal range of motion and neck supple.      Right lower leg: Edema present.      Left lower leg: Edema present.      Comments: Pitting edema to bilateral hips   Skin:     General: Skin is warm and dry.      Capillary Refill: Capillary refill takes less than 2 seconds.      Coloration: Skin is not jaundiced.      Findings: No bruising.      Comments:  Bullae, dark discoloration like with persisting erythema, sloughing of skin on right lower extremity, worsening Erythema not extending. Subsiding comparing to previous encounter.    Also dark discoloration of RUE ranging along entire medial forearm   Neurological:      General: No focal deficit present.      Mental Status: He is alert.      Cranial Nerves: No cranial nerve deficit.      Motor: No weakness.   Psychiatric:         Mood and Affect: Mood normal.         Behavior: Behavior normal.         Thought Content: Thought content normal.     Labs:  Recent Labs     02/13/21  0651 02/14/21  0213 02/15/21  0301   WBC 12.0* 11.2* 12.1*   RBC 2.97* 3.17* 3.16*   HEMOGLOBIN 9.7* 10.2* 10.2*   HEMATOCRIT 29.1* 31.5* 31.1*   MCV 98.0* 99.4* 98.4*   MCH 32.7 32.2 32.3   RDW 61.2* 63.2* 61.6*   PLATELETCT 271 249 203   MPV 10.4 10.3 10.6   NEUTSPOLYS 85.20* 81.70* 79.10*   LYMPHOCYTES 6.10* 7.00* 8.10*   MONOCYTES 2.60 1.70 6.00   EOSINOPHILS 0.00 1.70 1.30   BASOPHILS 0.90 0.00 0.30   RBCMORPHOLO Present  Present  --      Recent Labs     02/13/21  0651 02/14/21  0213 02/15/21  0301   SODIUM 136 129* 131*   POTASSIUM 3.7 3.7 4.0   CHLORIDE 100 97 99   CO2 26 24 24   GLUCOSE 130* 105* 98   BUN 21 19 17     Recent Labs     02/13/21  0651 02/14/21  0213 02/15/21  0301   ALBUMIN 2.7* 2.7* 2.8*   TBILIRUBIN 0.8 0.7 0.7   ALKPHOSPHAT 70 70 76   TOTPROTEIN 5.7* 6.0 6.1   ALTSGPT 18 20 19   ASTSGOT 27 23 24   CREATININE 1.12 1.03 1.13       Micro:  Blood cultures positive on 2/4 with GAS  Blood cultures negative on 2/6  Blood cultures from 02/10 and 2/11 negative.     EKG:  None    Imaging:   CT right leg: Shows extensive edema, cellulitis, superficial fasciitis.  No soft tissue gas.  No osteomyelitis.    Meds:  Current Outpatient Medications   Medication Instructions   • acetaminophen (TYLENOL) 1,000 mg, Oral, EVERY 6 HOURS PRN   • allopurinol (ZYLOPRIM) 100 mg, Oral, EVERY MORNING   • benzonatate (TESSALON) 100 mg, Oral, 3 TIMES DAILY PRN   • cetirizine (ZYRTEC) 10 mg, Oral, EVERY MORNING   • colchicine (COLCRYS) 0.6 mg, Oral, 2 TIMES DAILY   • furosemide (LASIX) 20 mg, Oral, DAILY   • hydrOXYzine HCl (ATARAX) 25 mg, Oral, 3 TIMES DAILY PRN   • lisinopril (PRINIVIL) 2.5 mg, Oral, EVERY EVENING   • metFORMIN (GLUCOPHAGE) 500 mg, Oral, 2 TIMES DAILY WITH MEALS   • metoprolol SR (TOPROL XL) 50 mg, Oral, EVERY MORNING   • tamsulosin (FLOMAX) 0.4 MG capsule TAKE 1 CAPSULE BY MOUTH EVERY DAY   • warfarin (COUMADIN) 2 mg, Oral, SEE ADMIN INSTRUCTIONS, Takes 2 mg every Friday has 5 mg tablets to take all other days   • warfarin (COUMADIN) 5 mg, Oral, SEE ADMIN INSTRUCTIONS, Takes Monday, Tuesday, Wednesday, Thursday, Saturday, and Sunday, has 2 mg tablets to take on Fridays      Scheduled Medications   Medication Dose Frequency   • enoxaparin (LOVENOX) injection  100 mg Q12HRS   • cefTRIAXone (ROCEPHIN) IV  2 g Q24HRS   • metroNIDAZOLE  500 mg Q8HRS   • lisinopril  2.5 mg Q DAY   • allopurinol  100 mg QAM   • insulin lispro  0-10  Units TID AC   • metoprolol SR  50 mg Q DAY   • acetaminophen  650 mg TID   • zinc sulfate  220 mg DAILY   • multivitamin  1 tablet DAILY   • [START ON 2/20/2021] amiodarone  200 mg DAILY   • amiodarone  400 mg TWICE DAILY      Consultants/Specialty:  Wound  PT  OT  Cardiology  Infectious disease    Problem Representation:   Morales Olivier is a 65-year-old male, admitted on Feb 4th for 3/4 SIRS criteria, concern for severe sepsis secondary to RLE cellulitis.  Admitted for severe sepsis versus septic shock.  Patient intubated in the ER after failing BiPAP.  Despite history of sick sinus syndrome status post pacemaker, patient found to be in SVT which later progressed to A. fib with RVR, on anticoagulation with warfarin.  Cardiology started amiodarone. The patient was successfully extubated on 2/8.  Now being treated for group A cellulitis with IV antibiotics.  On 2/10 ID broadened ABX to ceftriaxone plus Flagyl till 2/20, with repeat Ct bgative for abscess. CPK downtrending , warfarin switched to lovenox in prep for midline    * Bacteremia in the setting of Cellulitis of right lower extremity- (present on admission)  Assessment & Plan  Blood cultures 2/4 grew GAS, with repeat blood cultures negative.   ID following.  Recommends 14 days of antibiotics from 2/6- 2/20 with ceftriaxone and Flagyl, as blood cell count has remained high despite adequate IV antibiotics. Cannot  Switch PO abx given bacteremia indication /valve  CT leg negative for abscess.   Blood cultures negative so far from 2/11 (as well as 2/10 and 2/6)  Pain: oxy 5-10   Zofran prn for emesis  Procal downtrended to .30  WBC downtrending, CTM        Continue wound care       Midline being placed today       Lovenox >coumadin bridge to follow    Right Upper Extremity Swelling and Warmth      U/S ordered of RUE; of note is not suspecting clot formation as on lovenox,         more likely IV infiltrated       IV access and lab draws on LUE only please (if  possible)      Picc line decisions pending, CM talking to staff about this but       U/S of RUE indicated cephalic vein thrombus (small, nonocclusive), no DVT      Warm compresses    Fluid overload secondary to IV fluids   Assessment & Plan  Resolved  Dc'd daily weights and Is and Os       Becoming hyponatremic, appears euvolemic/hypovolemic in setting of daily lasix thus I will dc this and CTM his labs  Educated on PRN pain meds  Likely secondary to IV fluids given per sepsis protocol previously, Echo Ef of 45%, normal right ventricular function  Blood pressure stable      Outpatient repeat echocardiogram recommended by cardiology in admission       On low dose lisinopril as of 02/11       Atrial fibrillation with RVR ([facility])- (present on admission)  Assessment & Plan  On telemetry, ventricularly paced rhythm   Amiodarone therapy started by cardiology.  Anticoagulation updated from warfarin to lovenox  Patient has pacemaker for sick sinus syndrome  Continue metoprolol XL- uptitrate as needed.      Sepsis ([facility])- (present on admission)  Assessment & Plan  Resolving  Severe Sepsis Present on admission, with respiratory failure and EBEN  Patient met 3/4 SIRS criteria on admission  Admission WBC 28.8 -> now trended down since admission.  Source of infection is left lower extremity cellulitis  Sepsis protocol initiated.  Patient received IV fluids and antibiotics  Patient no longer shows signs of endorgan damage  Echo and KD normal, no vegetation  Continue ceftriaxone and flagyl.   C diff negative    COPD (chronic obstructive pulmonary disease) ([facility])- (present on admission)  Assessment & Plan  Not in exacerbation  18-pack-year history  Patient now on ipratropium nebulizer    Type 2 diabetes mellitus without complication, without long-term current use of insulin ([facility])- (present on admission)  Assessment & Plan  Recent A1c 7.6  Patient is not on home insulin  SSI lispro  Likely will not need insulin therapy at  home    Leukocytosis with bandemia  Assessment & Plan  -See cellulitis plan    Lactic acid acidosis- (present on admission)  Assessment & Plan  Resolved  Secondary to tissue ischemia in setting of severe sepsis.    Paroxysmal SVT (supraventricular tachycardia) (HCC)- (present on admission)  Assessment & Plan  Initially noted to be in supraventricular tachycardia changing to A. fib with RVR.  Refer to afib plan.    Acute respiratory failure with hypoxia (HCC)- (present on admission)  Assessment & Plan  Resolved  Likely combination of sepsis, A. fib RVR  Now saturating 93 to 95% on room air  Incentive spirometer    Gout Flare      -possible that this could be from seeding in setting of bacteremia;when discharges, if this is to return he will need to have joint tapped      -Was previously startedon colchicine and home allopurinol.           Code status: Full code  Diet: Cardiac diabetic  Lines/ tubes/ drains: Condom cath  IV fluids: None  GI ppx: MiraLAX stopped.  DVT ppx: Lovenox>transition back to coumadin for discharge  SNF referral placed

## 2021-02-15 NOTE — THERAPY
"Occupational Therapy  Daily Treatment     Patient Name: Morales Olivier  Age:  65 y.o., Sex:  male  Medical Record #: [id number]  Today's Date: 2/15/2021    Precautions: Fall Risk  Comments: RLE wrapped in gauze    Assessment    Pt agreeable to functional activities with encouragement. Pt refused oral care but did participate in some light grooming. Pt demonstrated improved independence with sit>stand and functional transfers, reports transferring [place] [facility] [place] nursing staff earlier today. Pt demonstrated adequate flexibility to manage socks/shoes in figure 4 sitting position however required assist today d/t tight ill-fitting socks and bulky gauze dressing to RLE. Pt functional independence is improving but continues to be limited by fear of falling and motivation to participate.     Plan    Continue current treatment plan.    DC Equipment Recommendations: Unable to determine at this time  Discharge Recommendations: Recommend post-acute placement for additional occupational therapy services prior to discharge home    Subjective    \"I didn't get any sleep.\"     Objective     02/15/21 0941   Balance   Weight Shift Sitting Fair   Weight Shift Standing Fair   Bed Mobility    Supine to Sit Supervised   Scooting Supervised   Rolling Supervised   Activities of Daily Living   Eating Modified Independent   Grooming Minimal Assist;Seated  (d/t disinterest in tasks)   Upper Body Dressing Minimal Assist   Lower Body Dressing Moderate Assist  (able to reach feet to don socks in figure 4 position)   Toileting   (declined)   Functional Mobility   Sit to Stand Minimal Assist   Bed, Chair, Wheelchair Transfer Minimal Assist   Transfer Method Stand Step   Mobility with FWW, CGA for safety   Skilled Intervention Tactile Cuing;Verbal Cuing   Short Term Goals   Short Term Goal # 1 min A with UB dressing   Goal Outcome # 1 Goal met, new goal added   Short Term Goal # 1 B  pt will demo UB dress at SPV level   Short Term Goal # 2 mod A with " LB dressing   Goal Outcome # 2 Progressing slower than expected  (pt appears able but lacks volition/motivation)   Short Term Goal # 3 mod A with ADl txfs   Goal Outcome # 3 Goal met, new goal added   Short Term Goal # 3 B pt will complete toilet transfer at SPV level

## 2021-02-15 NOTE — PROGRESS NOTES
Patient reported some generalized itchiness over arms and trunk,  Skin assessed WNL. Spoke with Dr Clifford who state she would add something for itchiness.

## 2021-02-15 NOTE — PROGRESS NOTES
Assumed care of pt @ 1915, bedside report received from OSWALD Deutsch.  Pt A&OX4 and denies any pain.  Bed locked and lowered with call light within reach and questions answered during present time.

## 2021-02-15 NOTE — PROGRESS NOTES
Received call from Dr. Clifford who wanted to clarify that when and if a patient gets a midline placed , it needs to go in the  Left extremity.  Confirmed with Dr. Clifford that this writer will let Vascular access know before any line placement.

## 2021-02-16 NOTE — PROGRESS NOTES
Assumed care at 0700, bedside report received from Skylar AKERS. Initial assessment completed, orders reviewed, call light within reach, bed alarm is in use, and hourly rounding in place. POC addressed with patient, no additional questions at this time.

## 2021-02-16 NOTE — PROGRESS NOTES
Inpatient Anticoagulation Service Note    Date: 2/15/2021    Reason for Anticoagulation: Atrial Fibrillation, hx of Deep Vein Thrombosis, Mechanical Mitral Valve Replacement , Aortic Mechanical Valve Replacement       Target INR: 2.5 to 3.5  [id number] VASc Score: 5  HAS-BLED Score: 2     Hemoglobin Value: (!) 10.2  Hematocrit Value: (!) 31.1  Lab Platelet Value: 203    INR from last 7 days     Date/Time INR Value    21 0213  (!) 1.46    21 0651  (!) 1.57    21 0754  (!) 1.92    21 0408  (!) 3.1    02/10/21 0242  (!) 5.7    21 0218  (!) 4.47        Dose from last 7 days     Date/Time Dose (mg)    02/15/21 2020  2    21 1341  1.5    02/10/21 1415  0    21 1347  0        Average Dose (mg): 2 mg every Thursday, 4 mg every Wednesday, 5 mg every Tuesday, 2.5 mg all the other days, weekly dose 21 mg.   Significant Interactions: Amiodarone, Antibiotics, Flagyl  Bridge Therapy: Yes  Days of Overlap Therapy: 1  Reversal Agent Administered: Not Applicable    Comments: Warfarin resumed at 2 mg daily (dose ordered) after midline placement, bridging with therapeutic lovenox due to sub-therapeutic INR, need 2 therapeutic INRs before stopping lovenox. New DDI with amiodarone and flagyl this admission, likely to need a lower warfarin dose than his home dosing. INR x 5 occurances ordered ( to ). H/H low but at baseline, no overt bleeding per chart review. Pharmacy will trend INR daily and adjust dose if appropriate.    Education Material Provided?: No(chronic warfarin patient)  Pharmacist suggested discharge dosin mg daily, check INR within 3 days of discharge.      Naye Clemons, PharmD

## 2021-02-16 NOTE — DISCHARGE SUMMARY
Discharge Summary    CHIEF COMPLAINT ON ADMISSION  Chief Complaint   Patient presents with   • Chest Pain   • Shortness of Breath   • Fall Less than 10 Feet     down for 2-4 days at an [facility].        Reason for Admission  sepsis versus septic shock due to cellulitis    Admission Date  2/4/2021    CODE STATUS  Full Code    HPI & HOSPITAL COURSE  This is a 65 y.o. male here with a past medical history of atrial fibrillation and sick sinus syndrome with a pacemaker, history of mitral valve replacement, post pacemaker on Coumadin, coronary artery disease history, chronic kidney disease, chronic obstructive pulmonary disease, who presented on 2-4-21 as he was found on the ground for 2-4 days.  Work-up in the emergency department significant for tachycardia of 190, tachypnea of 50, leukocytosis of 28.8, lactic acid of 3.7, and diffuse interstitial infiltrates with a small right pleural effusion.  He was admitted to the hospital for severe sepsis versus septic shock in the setting of what would later be determined to be from bacteremia in the setting of right lower extremity cellulitis.    In the patient's initial hospital course, he required ICU level of care where he was intubated after he failed BiPAP .  In the ICU he was found to be in supraventricular tachycardia, progressing to atrial fibrillation with rapid ventricular rate, and needed to be anticoagulated.  Cardiology was consulted on the patient and initiated on amiodarone.  Extubation occurred on 2-8-21, after which he transferred to the medical floor.Patient experienced fluid overload secondary to intravenous fluids from septic work-up; echocardiogram was done where he was found to have an ejection fraction of 45% with normal right ventricular function.  With concerns of acute decompensated heart failure, he was given Lasix until he became euvolemic and back to his normal weight.  Cardiology, who was following the patient, recommended a repeat echocardiogram  outpatient, and to follow this dosing for amiodarone: amiodarone 400 mg oral twice daily for 2 weeks, then 400 mg daily for 2 weeks, then 200 mg daily.     Infectious disease was brought on board for his bacteremia in the setting of right lower extremity cellulitis and recommended a 14-day course of antibiotics (2-6-21 to 2-20-21).  Initially he was on intravenous penicillin;infectious disease transitioned him to ceftriaxone and Flagyl, and ultimately to ceftriaxone in order to treat his bacteremia for full intravenous course given his prosthetic valve, and based off of his blood cultures, which grew group A strep. CT of his right lower extremity was done amidst this window and was consistent with cellulitis, negative for abscess.  Transesophageal echocardiogram was done 2-6-21 which was negative for vegetations on valves/pacemaker wires. Blood cultures were followed, and were last negative on 2-11-21 (negative 3 times total) after 2-4-21 growth. which He required anti-histamines for recent pruritus (suspected in the setting of ceftriaxone) and will be supplied dosing until 2-20-21 for ceftriaxone completion.  A midline was placed on 2-15-21 such that he could complete his antibiotic course at skilled nursing facility. He required coumadin to lovenox transition for midline, and now lovenox bridge to coumadin. Physical , occupational, and wound teams all saw patient (limited in mobility due to his wound and condition); he was recommended skilled nursing facility placement. He will continue bridge at skilled nursing facility where he is being discharged to today. Pharmacy gave 3mg warfarin on day of discharge and recommended discharge dosing of warfarin 1.5mg daily. Recommended 5 days overlap of warfarin-lovenox with INR in goal twice 24 hours apart (goal is 2.5 to 3.5)    Gout flare:  Patient has a history of gout and experienced a gout flare for which he was given allopurinol and colchicine. Flare resolved. He will  be discharged with allopurinol. Advised that if flares again will need to follow up for medical attention (concerns about need to do a tap)     Right upper extremity cephalic vein thrombus:  Ultrasound of right upper extremity indicated cephalic vein thrombus secondary to IV infiltrate.  Warm compresses were given.        Therefore, he is discharged in good and stable condition to skilled nursing facility.    The patient met 2-midnight criteria for an inpatient stay at the time of discharge.    Discharge Date  02/16/21    FOLLOW UP ITEMS POST DISCHARGE  Follow up with cardiology outpatient for repeat echocardiogram  Follow up with primary care    DISCHARGE DIAGNOSES  Principal Problem:    Cellulitis of right lower extremity (Chronic) POA: Yes  Active Problems:    Atrial fibrillation with RVR (HCC) POA: Yes    Fluid overload secondary to IV fluids  POA: Unknown    Type 2 diabetes mellitus without complication, without long-term current use of insulin (formerly [facility]) POA: Yes    COPD (chronic obstructive pulmonary disease) (formerly [facility]) POA: Yes    Sepsis (formerly [facility]) POA: Yes    Leukocytosis with bandemia POA: Unknown    Acute respiratory failure with hypoxia (formerly [facility]) POA: Yes    Paroxysmal SVT (supraventricular tachycardia) (formerly [facility]) POA: Yes    Lactic acid acidosis POA: Yes  Resolved Problems:    Paroxysmal atrial fibrillation (formerly [facility]) POA: Yes    Anticoagulated POA: Yes    H/O mitral valve and aortic valve replacement POA: Yes    Pacemaker POA: Yes    Encounter for monitoring amiodarone therapy POA: No      FOLLOW UP  Future Appointments   Date Time Provider Department Center   2/17/2021  1:40 PM Sumanth Yancey M.D. [facility] None     Follow up with cardiology and primary care being scheduled.    MEDICATIONS ON DISCHARGE  Current Outpatient Medications   Medication Sig Dispense Refill   • [START ON 2/17/2021] warfarin (COUMADIN) 3 MG Tab Take 0.5 Tablets by mouth every day at 6 PM for 30 days. 15 tablet 0   • enoxaparin (LOVENOX) 120 MG/0.8ML Solution  inj Inject 120 mg under the skin every 12 hours for 4 days. 8 Each 0   • famotidine (PEPCID) 20 MG Tab Take 1 tablet by mouth 2 Times a Day for 4 days. 8 tablet 0   • [START ON 2/17/2021] multivitamin (THERAGRAN) Tab Take 1 tablet by mouth every day. 30 tablet 0   • ondansetron (ZOFRAN ODT) 4 MG TABLET DISPERSIBLE Take 1 tablet by mouth every four hours as needed for Nausea (give PO if IV route is unavailable.) for up to 4 days. 10 tablet 0   • [START ON 2/17/2021] NS SOLN 100 mL with cefTRIAXone 2 GM SOLR 2 g Infuse 2 g into a venous catheter every 24 hours for 3 days. 5 Vial 0   • amiodarone (PACERONE) 400 MG tablet     amiodarone 400 mg oral twice daily  until 2-20-21, then 400 mg daily for 2 weeks (2 weeks from 2-20-21), then 200 mg daily (indefinitely).     amiodarone 400 mg oral twice daily f\until 2-20-21, then 400 mg daily for 2 weeks (2 weeks from 2-20-21), then 200 mg daily (indefinitely).          30 tablets 0   • oxyCODONE immediate-release (ROXICODONE) 5 MG Tab Take 1 tablet by mouth every 6 hours as needed for Severe Pain for up to 15 days. 15 tablet 0   \Allergies  No Known Allergies    DIET  Orders Placed This Encounter   Procedures   • Diet Order Diet: Cardiac; Second Modifier: (optional): Consistent CHO (Diabetic)     Standing Status:   Standing     Number of Occurrences:   1     Order Specific Question:   Diet:     Answer:   Cardiac [6]     Order Specific Question:   Second Modifier: (optional)     Answer:   Consistent CHO (Diabetic) [4]       ACTIVITY  As tolerated.  Weight bearing as tolerated    CONSULTATIONS  Cardiology  Critical care  Physical/occupational therapy  Wound care    PROCEDURES  No procedures    LABORATORY  Lab Results   Component Value Date    SODIUM 131 (L) 02/15/2021    POTASSIUM 4.0 02/15/2021    CHLORIDE 99 02/15/2021    CO2 24 02/15/2021    GLUCOSE 98 02/15/2021    BUN 17 02/15/2021    CREATININE 1.13 02/15/2021    CREATININE 1.4 04/27/2009        Lab Results   Component  Value Date    WBC 12.1 (H) 02/15/2021    HEMOGLOBIN 10.2 (L) 02/15/2021    HEMATOCRIT 31.1 (L) 02/15/2021    PLATELETCT 203 02/15/2021        Total time of the discharge process exceeds 60  minutes.

## 2021-02-16 NOTE — PROGRESS NOTES
Bedside report received from day shift RN. Assumed care at 1900. Pt is A&Ox4. Pt is in bed. Pt was updated on plan of care. Pt has call light, personal belongings, and bedside table within reach. Bed is in the lowest position. Pt calls for assistance. Will continue to monitor

## 2021-02-16 NOTE — PROGRESS NOTES
Inpatient Anticoagulation Service Note  Date: 2/16/2021  Reason for Anticoagulation: Atrial Fibrillation, Deep Vein Thrombosis, Mechanical Mitral Valve Replacement  , Aortic Mechanical Valve Replacement     [id number] VASc Score: 5  HAS-BLED Score: 2   Hemoglobin Value: (!) 10.2  Hematocrit Value: (!) 31.1  Lab Platelet Value: 203  Target INR: 2.5 to 3.5  INR from last 7 days     Date/Time INR Value    02/16/21 0230  (!) 1.37    02/14/21 0213  (!) 1.46    02/13/21 0651  (!) 1.57    02/12/21 0754  (!) 1.92    02/11/21 0408  (!) 3.1    02/10/21 0242  (!) 5.7        Dose from last 7 days     Date/Time Dose (mg)    02/16/21 1358  3    02/15/21 2020  2    02/11/21 1341  1.5    02/10/21 1415  0        Average Dose (mg): TBD (Home Dose: 2 mg Th + 5 mg We + 2.5 mg ROW)  Significant Interactions: Amiodarone, Antibiotics  Bridge Therapy: Yes  Bridge Therapy Start Date: 02/15/21  Days of Overlap Therapy: 1  INR Value Greater than 2 Prior to Discontinuation of Parenteral Anticoagulation: Not Applicable  Reversal Agent Administered: Not Applicable  Comments: INR below goal. No overt bleeding noted per chart review. H/H low. PLT down. Warfarin interactions noted (new start amiodarone and recent metronidazole use). Enoxaparin overlap noted (day #1). Will give warfarin 3 mg today as primary team requesting more aggressive move towards goal INR. INR with AM labs. Likely to need dose adjustments.    Plan:  Warfarin 3 mg 2/16/21  Education Material Provided?: No (chronic warfarin patient)  Pharmacist suggested discharge dosing: warfarin 1.5 mg daily (would consider given new amiodarone DDI)    Joey Hathaway, PharmD

## 2021-02-16 NOTE — DISCHARGE INSTRUCTIONS
Follow up with cardiology in 1-2 weeks, as well as primary care. Continue wound care as indicated. Take pain medication (limited supply) only when absolutely need it.    Discharge Instructions    Discharged to other by medical transportation with escort. Discharged via wheelchair, hospital escort: Yes.  Special equipment needed: Not Applicable    Be sure to schedule a follow-up appointment with your primary care doctor or any specialists as instructed.     Discharge Plan:   Diet Plan: Discussed  Activity Level: Discussed  Confirmed Follow up Appointment: Appointment Scheduled  Confirmed Symptoms Management: Discussed  Medication Reconciliation Updated: Yes  Influenza Vaccine Indication: Patient Refuses    I understand that a diet low in cholesterol, fat, and sodium is recommended for good health. Unless I have been given specific instructions below for another diet, I accept this instruction as my diet prescription.   Other diet: regular    Special Instructions:   Sepsis, Diagnosis, Adult  Sepsis is a serious bodily reaction to an infection. The infection that triggers sepsis may be from a bacteria, virus, or fungus. Sepsis can result from an infection in any part of your body. Infections that commonly lead to sepsis include skin, lung, and urinary tract infections.  Sepsis is a medical emergency that must be treated right away in a hospital. In severe cases, it can lead to septic shock. Septic shock can weaken your heart and cause your blood pressure to drop. This can cause your central nervous system and your body's organs to stop working.  What are the causes?  This condition is caused by a severe reaction to infections from bacteria, viruses, or fungus. The germs that most often lead to sepsis include:  · Escherichia coli (E. coli) bacteria.  · Staphylococcus aureus (staph) bacteria.  · Some types of Streptococcus bacteria.  The most common infections affect these organs:  · The lung (pneumonia).  · The kidneys  or bladder (urinary tract infection).  · The skin (cellulitis).  · The bowel, gallbladder, or pancreas.  What increases the risk?  You are more likely to develop this condition if:  · Your body's disease-fighting system (immune system) is weakened.  · You are age 65 or older.  · You are male.  · You had surgery or you have been hospitalized.  · You have these devices inserted into your body:  ? A small, thin tube (catheter).  ? IV line.  ? Breathing tube.  ? Drainage tube.  · You are not getting enough nutrients from food (malnourished).  · You have a long-term (chronic) disease, such as cancer, lung disease, kidney disease, or diabetes.  · You are .  What are the signs or symptoms?  Symptoms of this condition may include:  · Fever.  · Chills or feeling very cold.  · Confusion or anxiety.  · Fatigue.  · Muscle aches.  · Shortness of breath.  · Nausea and vomiting.  · Urinating much less than usual.  · Fast heart rate (tachycardia).  · Rapid breathing (hyperventilation).  · Changes in skin color. Your skin may look blotchy, pale, or blue.  · Cool, clammy, or sweaty skin.  · Skin rash.  Other symptoms depend on the source of your infection.  How is this diagnosed?  This condition is diagnosed based on:  · Your symptoms.  · Your medical history.  · A physical exam.  Other tests may also be done to find out the cause of the infection and how severe the sepsis is. These tests may include:  · Blood tests.  · Urine tests.  · Swabs from other areas of your body that may have an infection. These samples may be tested (cultured) to find out what type of bacteria is causing the infection.  · Chest X-ray to check for pneumonia. Other imaging tests, such as a CT scan, may also be done.  · Lumbar puncture. This removes a small amount of the fluid that surrounds your brain and spinal cord. The fluid is then examined for infection.  How is this treated?  This condition must be treated in a hospital. Based on the  cause of your infection, you may be given an antibiotic, antiviral, or antifungal medicine.  You may also receive:  · Fluids through an IV.  · Oxygen and breathing assistance.  · Medicines to increase your blood pressure.  · Kidney dialysis. This process cleans your blood if your kidneys have failed.  · Surgery to remove infected tissue.  · Blood transfusion if needed.  · Medicine to prevent blood clots.  · Nutrients to correct imbalances in basic body function (metabolism). You may:  ? Receive important salts and minerals (electrolytes) through an IV.  ? Have your blood sugar level adjusted.  Follow these instructions at home:  Medicines    · Take over-the-counter and prescription medicines only as told by your health care provider.  · If you were prescribed an antibiotic, antiviral, or antifungal medicine, take it as told by your health care provider. Do not stop taking the medicine even if you start to feel better.  General instructions  · If you have a catheter or other indwelling device, ask to have it removed as soon as possible.  · Keep all follow-up visits as told by your health care provider. This is important.  Contact a health care provider if:  · You do not feel like you are getting better or regaining strength.  · You are having trouble coping with your recovery.  · You frequently feel tired.  · You feel worse or do not seem to get better after surgery.  · You think you may have an infection after surgery.  Get help right away if:  · You have any symptoms of sepsis.  · You have difficulty breathing.  · You have a rapid or skipping heartbeat.  · You become confused or disoriented.  · You have a high fever.  · Your skin becomes blotchy, pale, or blue.  · You have an infection that is getting worse or not getting better.  These symptoms may represent a serious problem that is an emergency. Do not wait to see if the symptoms will go away. Get medical help right away. Call your local emergency services (919  in the [place] Do not drive yourself to the hospital.  Summary  · Sepsis is a medical emergency that requires immediate treatment in a hospital.  · This condition is caused by a severe reaction to infections from bacteria, viruses, or fungus.  · Based on the cause of your infection, you may be given an antibiotic, antiviral, or antifungal medicine.  · Treatment may also include IV fluids, breathing assistance, and kidney dialysis.  This information is not intended to replace advice given to you by your health care provider. Make sure you discuss any questions you have with your health care provider.  Document Released: 09/15/2004 Document Revised: 07/26/2019 Document Reviewed: 07/26/2019  [employer] Patient Education © 2020 [employer]      · Is patient discharged on Warfarin / Coumadin?   Yes    You are receiving the drug warfarin. Please understand the importance of monitoring warfarin with scheduled PT/INR blood draws.  Follow-up with a call to your personal Doctor's office in 3 days to schedule a PT/INR. .    IMPORTANT: HOW TO USE THIS INFORMATION:  This is a summary and does NOT have all possible information about this product. This information does not assure that this product is safe, effective, or appropriate for you. This information is not individual medical advice and does not substitute for the advice of your health care professional. Always ask your health care professional for complete information about this product and your specific health needs.      WARFARIN - ORAL (WARF-uh-rin)      COMMON BRAND NAME(S): Coumadin      WARNING:  Warfarin can cause very serious (possibly fatal) bleeding. This is more likely to occur when you first start taking this medication or if you take too much warfarin. To decrease your risk for bleeding, your doctor or other health care provider will monitor you closely and check your lab results (INR test) to make sure you are not taking too much warfarin. Keep all medical and  "laboratory appointments. Tell your doctor right away if you notice any signs of serious bleeding. See also Side Effects section.      USES:  This medication is used to treat blood clots (such as in deep vein thrombosis-DVT or pulmonary embolus-PE) and/or to prevent new clots from forming in your body. Preventing harmful blood clots helps to reduce the risk of a stroke or heart attack. Conditions that increase your risk of developing blood clots include a certain type of irregular heart rhythm (atrial fibrillation), heart valve replacement, recent heart attack, and certain surgeries (such as hip/knee replacement). Warfarin is commonly called a \"blood thinner,\" but the more correct term is \"anticoagulant.\" It helps to keep blood flowing smoothly in your body by decreasing the amount of certain substances (clotting proteins) in your blood.      HOW TO USE:  Read the Medication Guide provided by your pharmacist before you start taking warfarin and each time you get a refill. If you have any questions, ask your doctor or pharmacist. Take this medication by mouth with or without food as directed by your doctor or other health care professional, usually once a day. It is very important to take it exactly as directed. Do not increase the dose, take it more frequently, or stop using it unless directed by your doctor. Dosage is based on your medical condition, laboratory tests (such as INR), and response to treatment. Your doctor or other health care provider will monitor you closely while you are taking this medication to determine the right dose for you. Use this medication regularly to get the most benefit from it. To help you remember, take it at the same time each day. It is important to eat a balanced, consistent diet while taking warfarin. Some foods can affect how warfarin works in your body and may affect your treatment and dose. Avoid sudden large increases or decreases in your intake of foods high in vitamin K " (such as broccoli, cauliflower, cabbage, brussels sprouts, kale, spinach, and other green leafy vegetables, liver, green tea, certain vitamin supplements). If you are trying to lose weight, check with your doctor before you try to go on a diet. Cranberry products may also affect how your warfarin works. Limit the amount of cranberry juice (16 ounces/480 milliliters a day) or other cranberry products you may drink or eat.      SIDE EFFECTS:  Nausea, loss of appetite, or stomach/abdominal pain may occur. If any of these effects persist or worsen, tell your doctor or pharmacist promptly. Remember that your doctor has prescribed this medication because he or she has judged that the benefit to you is greater than the risk of side effects. Many people using this medication do not have serious side effects. This medication can cause serious bleeding if it affects your blood clotting proteins too much (shown by unusually high INR lab results). Even if your doctor stops your medication, this risk of bleeding can continue for up to a week. Tell your doctor right away if you have any signs of serious bleeding, including: unusual pain/swelling/discomfort, unusual/easy bruising, prolonged bleeding from cuts or gums, persistent/frequent nosebleeds, unusually heavy/prolonged menstrual flow, pink/dark urine, coughing up blood, vomit that is bloody or looks like coffee grounds, severe headache, dizziness/fainting, unusual or persistent tiredness/weakness, bloody/black/tarry stools, chest pain, shortness of breath, difficulty swallowing. Tell your doctor right away if any of these unlikely but serious side effects occur: persistent nausea/vomiting, severe stomach/abdominal pain, yellowing eyes/skin. This drug rarely has caused very serious (possibly fatal) problems if its effects lead to small blood clots (usually at the beginning of treatment). This can lead to severe skin/tissue damage that may require surgery or amputation if left  untreated. Patients with certain blood conditions (protein C or S deficiency) may be at greater risk. Get medical help right away if any of these rare but serious side effects occur: painful/red/purplish patches on the skin (such as on the toe, breast, abdomen), change in the amount of urine, vision changes, confusion, slurred speech, weakness on one side of the body. A very serious allergic reaction to this drug is rare. However, get medical help right away if you notice any symptoms of a serious allergic reaction, including: rash, itching/swelling (especially of the face/tongue/throat), severe dizziness, trouble breathing. This is not a complete list of possible side effects. If you notice other effects not listed above, contact your doctor or pharmacist. In the [place] - Call your doctor for medical advice about side effects. You may report side effects to FDA at 3-969-PFT-2570. In [place] - Call your doctor for medical advice about side effects. You may report side effects to [facility] [place] at 1-226.556.3819.      PRECAUTIONS:  Before taking warfarin, tell your doctor or pharmacist if you are allergic to it; or if you have any other allergies. This product may contain inactive ingredients, which can cause allergic reactions or other problems. Talk to your pharmacist for more details. Before using this medication, tell your doctor or pharmacist your medical history, especially of: blood disorders (such as anemia, hemophilia), bleeding problems (such as bleeding of the stomach/intestines, bleeding in the brain), blood vessel disorders (such as aneurysms), recent major injury/surgery, liver disease, alcohol use, mental/mood disorders (including memory problems), frequent falls/injuries. It is important that all your doctors and dentists know that you take warfarin. Before having surgery or any medical/dental procedures, tell your doctor or dentist that you are taking this medication and about all the products you use  (including prescription drugs, nonprescription drugs, and herbal products). Avoid getting injections into the muscles. If you must have an injection into a muscle (for example, a flu shot), it should be given in the arm. This way, it will be easier to check for bleeding and/or apply pressure bandages. This medication may cause stomach bleeding. Daily use of alcohol while using this medicine will increase your risk for stomach bleeding and may also affect how this medication works. Limit or avoid alcoholic beverages. If you have not been eating well, if you have an illness or infection that causes fever, vomiting, or diarrhea for more than 2 days, or if you start using any antibiotic medications, contact your doctor or pharmacist immediately because these conditions can affect how warfarin works. This medication can cause heavy bleeding. To lower the chance of getting cut, bruised, or injured, use great caution with sharp objects like safety razors and nail cutters. Use an electric razor when shaving and a soft toothbrush when brushing your teeth. Avoid activities such as contact sports. If you fall or injure yourself, especially if you hit your head, call your doctor immediately. Your doctor may need to check you. The Food & Drug Administration has stated that generic warfarin products are interchangeable. However, consult your doctor or pharmacist before switching warfarin products. Be careful not to take more than one medication that contains warfarin unless specifically directed by the doctor or health care provider who is monitoring your warfarin treatment. Older adults may be at greater risk for bleeding while using this drug. This medication is not recommended for use during pregnancy because of serious (possibly fatal) harm to an unborn baby. Discuss the use of reliable forms of birth control with your doctor. If you become pregnant or think you may be pregnant, tell your doctor immediately. If you are  "planning pregnancy, discuss a plan for managing your condition with your doctor before you become pregnant. Your doctor may switch the type of medication you use during pregnancy. Very small amounts of this medication may pass into breast milk but is unlikely to harm a nursing infant. Consult your doctor before breast-feeding.      DRUG INTERACTIONS:  Drug interactions may change how your medications work or increase your risk for serious side effects. This document does not contain all possible drug interactions. Keep a list of all the products you use (including prescription/nonprescription drugs and herbal products) and share it with your doctor and pharmacist. Do not start, stop, or change the dosage of any medicines without your doctor's approval. Warfarin interacts with many prescription, nonprescription, vitamin, and herbal products. This includes medications that are applied to the skin or inside the vagina or rectum. The interactions with warfarin usually result in an increase or decrease in the \"blood-thinning\" (anticoagulant) effect. Your doctor or other health care professional should closely monitor you to prevent serious bleeding or clotting problems. While taking warfarin, it is very important to tell your doctor or pharmacist of any changes in medications, vitamins, or herbal products that you are taking. Some products that may interact with this drug include: capecitabine, imatinib, mifepristone. Aspirin, aspirin-like drugs (salicylates), and nonsteroidal anti-inflammatory drugs (NSAIDs such as ibuprofen, naproxen, celecoxib) may have effects similar to warfarin. These drugs may increase the risk of bleeding problems if taken during treatment with warfarin. Carefully check all prescription/nonprescription product labels (including drugs applied to the skin such as pain-relieving creams) since the products may contain NSAIDs or salicylates. Talk to your doctor about using a different medication (such " as acetaminophen) to treat pain/fever. Low-dose aspirin and related drugs (such as clopidogrel, ticlopidine) should be continued if prescribed by your doctor for specific medical reasons such as heart attack or stroke prevention. Consult your doctor or pharmacist for more details. Many herbal products interact with warfarin. Tell your doctor before taking any herbal products, especially bromelains, coenzyme Q10, cranberry, danshen, dong quai, fenugreek, garlic, ginkgo biloba, ginseng, and Thong's wort, among others. This medication may interfere with a certain laboratory test to measure theophylline levels, possibly causing false test results. Make sure laboratory personnel and all your doctors know you use this drug.      OVERDOSE:  If overdose is suspected, contact a poison control center or emergency room immediately. [place] residents can call the [place] National Poison Hotline at 1-314.583.6194. [place] residents can call a provincial poison control center. Symptoms of overdose may include: bloody/black/tarry stools, pink/dark urine, unusual/prolonged bleeding.      NOTES:  Do not share this medication with others. Laboratory and/or medical tests (such as INR, complete blood count) must be performed periodically to monitor your progress or check for side effects. Consult your doctor for more details.      MISSED DOSE:  For the best possible benefit, do not miss any doses. If you do miss a dose and remember on the same day, take it as soon as you remember. If you remember on the next day, skip the missed dose and resume your usual dosing schedule. Do not double the dose to catch up because this could increase your risk for bleeding. Keep a record of missed doses to give to your doctor or pharmacist. Contact your doctor or pharmacist if you miss 2 or more doses in a row.      STORAGE:  Store at room temperature away from light and moisture. Do not store in the bathroom. Keep all medications away from children and pets.  Do not flush medications down the toilet or pour them into a drain unless instructed to do so. Properly discard this product when it is  or no longer needed. Consult your pharmacist or local waste disposal company for more details about how to safely discard your product.      MEDICAL ALERT:  Your condition and medication can cause complications in a medical emergency. For information about enrolling in MedicAlert, call 1-981.468.7600 ([place]) or 1-380.871.7768 ([place]).      Information last revised 2010 Copyright(c)  [employer]             Depression / Suicide Risk    As you are discharged from this [facility], it is important to learn how to keep safe from harming yourself.    Recognize the warning signs:  · Abrupt changes in personality, positive or negative- including increase in energy   · Giving away possessions  · Change in eating patterns- significant weight changes-  positive or negative  · Change in sleeping patterns- unable to sleep or sleeping all the time   · Unwillingness or inability to communicate  · Depression  · Unusual sadness, discouragement and loneliness  · Talk of wanting to die  · Neglect of personal appearance   · Rebelliousness- reckless behavior  · Withdrawal from people/activities they love  · Confusion- inability to concentrate     If you or a loved one observes any of these behaviors or has concerns about self-harm, here's what you can do:  · Talk about it- your feelings and reasons for harming yourself  · Remove any means that you might use to hurt yourself (examples: pills, rope, extension cords, firearm)  · Get professional help from the community (Mental Health, Substance Abuse, psychological counseling)  · Do not be alone:Call your Safe Contact- someone whom you trust who will be there for you.  · Call your local CRISIS HOTLINE 586-1671 or 088-219-0442  · Call your local Children's Mobile Crisis Response Team [place] (683) 945-7256 or  www.[employer]  · Call the toll free National Suicide Prevention Hotlines   · National Suicide Prevention Lifeline 390-526-TQGI (4609)  · National Hope Line Network 800-SUICIDE (393-5777)

## 2021-02-16 NOTE — DISCHARGE PLANNING
Anticipated Discharge Disposition: [facility]    Action: pt accepted to [facility], pt to transfer at 1500, Dr. Clifford and OSWALD Jackson notified. Pt gave verbal auth for transfer today at 1500.    Barriers to Discharge: none    Plan: Transfer to [facility] at 1500 by w/dar valdes    1556 - Transfer packet complete, given to OSWALD Jackson

## 2021-02-16 NOTE — CARE PLAN
Problem: Venous Thromboembolism (VTW)/Deep Vein Thrombosis (DVT) Prevention:  Goal: Patient will participate in Venous Thrombosis (VTE)/Deep Vein Thrombosis (DVT)Prevention Measures  Outcome: PROGRESSING AS EXPECTED     Problem: Bowel/Gastric:  Goal: Normal bowel function is maintained or improved  Outcome: PROGRESSING AS EXPECTED  Goal: Will not experience complications related to bowel motility  Outcome: PROGRESSING AS EXPECTED     Problem: Knowledge Deficit  Goal: Knowledge of disease process/condition, treatment plan, diagnostic tests, and medications will improve  Outcome: PROGRESSING AS EXPECTED  Goal: Knowledge of the prescribed therapeutic regimen will improve  Outcome: PROGRESSING AS EXPECTED     Problem: Communication  Goal: The ability to communicate needs accurately and effectively will improve  Description: Pt's whiteboard is updated, pt has been updated on POC, all questions have been answered at this time      Outcome: PROGRESSING AS EXPECTED     Problem: Safety  Goal: Will remain free from injury  Outcome: PROGRESSING AS EXPECTED  Goal: Will remain free from falls  Description: Bed locked in lowest position. Treaded socks in use. Call light and belongings within reach. Pt educated to call for assistance. Pt verbalized understanding. Hourly rounding in place.     Outcome: PROGRESSING AS EXPECTED

## 2021-02-16 NOTE — DISCHARGE PLANNING
Agency/Facility Name: [facility]   Spoke To: Pamela   Outcome: Per Pamela bed available today. Pamela would like a call back ones transport set up.    Received Transport Form @ 1222  Spoke to Brooke  @ [facility]    Transport is scheduled for Today 2/16/2021 @1630 going to [facility].    Pamela at [facility] notified

## 2021-02-16 NOTE — CARE PLAN
Problem: Communication  Goal: The ability to communicate needs accurately and effectively will improve  Description: Pt's whiteboard is updated, pt has been updated on POC, all questions have been answered at this time      Outcome: PROGRESSING AS EXPECTED  Note: Pt's whiteboard is updated, pt has been updated on POC, all questions have been answered at this time       Problem: Safety  Goal: Will remain free from falls  Description: Bed locked in lowest position. Treaded socks in use. Call light and belongings within reach. Pt educated to call for assistance. Pt verbalized understanding. Hourly rounding in place.     Outcome: PROGRESSING AS EXPECTED  Note: Bed locked in lowest position. Treaded socks in use. Call light and belongings within reach. Pt educated to call for assistance. Pt verbalized understanding. Hourly rounding in place.

## 2021-02-17 NOTE — PROGRESS NOTES
Daily Progress Note:     Date of Service: 2/16/2021  Primary Team: [facility] IM Yellow Team   Attending: Dr. Ponce  Senior Resident: Dr. Clifford  Contact:  [phone]    Chief Complaint: Fall    Subjective:  Patient c/o pain of his RUE, generalized pruritis, no f/c/n/v      Interval update:   Midline was done yesterday without complications  Lovenox coumadin bridge in order  Discharge today   Abx ordered for discharge      Review of Systems:    Review of Systems   Constitutional: Negative for chills and fever.   HENT: Negative for nosebleeds.    Eyes: Negative for discharge.   Respiratory: Negative for hemoptysis, sputum production and stridor.    Cardiovascular: Negative for chest pain, palpitations and leg swelling.   Gastrointestinal: Positive for nausea. Negative for blood in stool and diarrhea.   Genitourinary: Negative for dysuria and frequency.   Musculoskeletal: Negative for joint pain and neck pain.        RUE pain   Skin: Negative for itching.   Neurological: Negative for dizziness and headaches.   Endo/Heme/Allergies: Does not bruise/bleed easily.   Psychiatric/Behavioral: Negative for depression. The patient is not nervous/anxious.    All other systems reviewed and are negative.    Objective Data:   Vitals:   Temp:  [36.3 °C (97.3 °F)-36.6 °C (97.9 °F)] 36.5 °C (97.7 °F)  Pulse:  [80-82] 82  Resp:  [19-20] 20  BP: (106-153)/(67-89) 153/89  SpO2:  [94 %-96 %] 94 %   93-95 % on room air    Physical Exam:   Physical Exam  Constitutional:       General: He is not in acute distress.  HENT:      Head: Normocephalic and atraumatic.      Right Ear: External ear normal.      Left Ear: External ear normal.      Nose: Nose normal.      Mouth/Throat:      Mouth: Mucous membranes are moist.   Eyes:      Extraocular Movements: Extraocular movements intact.      Pupils: Pupils are equal, round, and reactive to light.   Cardiovascular:      Rate and Rhythm: Normal rate and regular rhythm.      Pulses: Normal pulses.    Pulmonary:      Effort: Pulmonary effort is normal. No respiratory distress.      Breath sounds: No wheezing or rales (Minimal).      Comments: Coarse.  Diminished.  Abdominal:      General: Abdomen is flat. Bowel sounds are normal. There is no distension.      Tenderness: There is no abdominal tenderness. There is no guarding.   Musculoskeletal:         General: Swelling and tenderness (On left knee and right elbow as well as site of cellulitis. ) present. No signs of injury.      Cervical back: Normal range of motion and neck supple.      Right lower leg: Edema present.      Left lower leg: Edema present.      Comments: Pitting edema to bilateral hips   Skin:     General: Skin is warm and dry.      Capillary Refill: Capillary refill takes less than 2 seconds.      Coloration: Skin is not jaundiced.      Findings: No bruising.      Comments:  Bullae, dark discoloration like with persisting erythema, sloughing of skin on right lower extremity, worsening Erythema not extending. Subsiding comparing to previous encounter.    Also dark discoloration of RUE ranging along entire medial forearm   Neurological:      General: No focal deficit present.      Mental Status: He is alert.      Cranial Nerves: No cranial nerve deficit.      Motor: No weakness.   Psychiatric:         Mood and Affect: Mood normal.         Behavior: Behavior normal.         Thought Content: Thought content normal.     Labs:  Recent Labs     02/14/21 0213 02/15/21  0301   WBC 11.2* 12.1*   RBC 3.17* 3.16*   HEMOGLOBIN 10.2* 10.2*   HEMATOCRIT 31.5* 31.1*   MCV 99.4* 98.4*   MCH 32.2 32.3   RDW 63.2* 61.6*   PLATELETCT 249 203   MPV 10.3 10.6   NEUTSPOLYS 81.70* 79.10*   LYMPHOCYTES 7.00* 8.10*   MONOCYTES 1.70 6.00   EOSINOPHILS 1.70 1.30   BASOPHILS 0.00 0.30   RBCMORPHOLO Present  --      Recent Labs     02/14/21  0213 02/15/21  0301   SODIUM 129* 131*   POTASSIUM 3.7 4.0   CHLORIDE 97 99   CO2 24 24   GLUCOSE 105* 98   BUN 19 17     Recent Labs      02/14/21  0213 02/15/21  0301   ALBUMIN 2.7* 2.8*   TBILIRUBIN 0.7 0.7   ALKPHOSPHAT 70 76   TOTPROTEIN 6.0 6.1   ALTSGPT 20 19   ASTSGOT 23 24   CREATININE 1.03 1.13       Micro:  Blood cultures positive on 2/4 with GAS  Blood cultures negative on 2/6  Blood cultures from 02/10 and 2/11 negative.     EKG:  None    Imaging:   CT right leg: Shows extensive edema, cellulitis, superficial fasciitis.  No soft tissue gas.  No osteomyelitis.    Meds:  Current Outpatient Medications   Medication Instructions   • acetaminophen (TYLENOL) 1,000 mg, Oral, EVERY 6 HOURS PRN   • allopurinol (ZYLOPRIM) 100 mg, Oral, EVERY MORNING   • benzonatate (TESSALON) 100 mg, Oral, 3 TIMES DAILY PRN   • cetirizine (ZYRTEC) 10 mg, Oral, EVERY MORNING   • colchicine (COLCRYS) 0.6 mg, Oral, 2 TIMES DAILY   • furosemide (LASIX) 20 mg, Oral, DAILY   • hydrOXYzine HCl (ATARAX) 25 mg, Oral, 3 TIMES DAILY PRN   • lisinopril (PRINIVIL) 2.5 mg, Oral, EVERY EVENING   • metFORMIN (GLUCOPHAGE) 500 mg, Oral, 2 TIMES DAILY WITH MEALS   • metoprolol SR (TOPROL XL) 50 mg, Oral, EVERY MORNING   • tamsulosin (FLOMAX) 0.4 MG capsule TAKE 1 CAPSULE BY MOUTH EVERY DAY   • warfarin (COUMADIN) 2 mg, Oral, SEE ADMIN INSTRUCTIONS, Takes 2 mg every Friday has 5 mg tablets to take all other days   • warfarin (COUMADIN) 5 mg, Oral, SEE ADMIN INSTRUCTIONS, Takes Monday, Tuesday, Wednesday, Thursday, Saturday, and Sunday, has 2 mg tablets to take on Fridays      Scheduled Medications   Medication Dose Frequency   • [START ON 2/17/2021] cetirizine  10 mg DAILY   • enoxaparin (LOVENOX) injection  1 mg/kg Q12HRS   • warfarin  3 mg ONCE AT 1800   • [START ON 2/17/2021] warfarin  1.5 mg DAILY AT 1800   • famotidine  20 mg BID   • MD Alert...Warfarin per Pharmacy   PHARMACY TO DOSE   • cefTRIAXone (ROCEPHIN) IV  2 g Q24HRS   • lisinopril  2.5 mg Q DAY   • allopurinol  100 mg QAM   • insulin lispro  0-10 Units TID AC   • metoprolol SR  50 mg Q DAY   • acetaminophen  650 mg  TID   • zinc sulfate  220 mg DAILY   • multivitamin  1 tablet DAILY   • [START ON 2/20/2021] amiodarone  200 mg DAILY   • amiodarone  400 mg TWICE DAILY      Consultants/Specialty:  Wound  PT  OT  Cardiology  Infectious disease    Problem Representation:   Morales Olivier is a 65-year-old male, admitted on Feb 4th for 3/4 SIRS criteria, concern for severe sepsis secondary to RLE cellulitis.  Admitted for severe sepsis versus septic shock.  Patient intubated in the ER after failing BiPAP.  Despite history of sick sinus syndrome status post pacemaker, patient found to be in SVT which later progressed to A. fib with RVR, on anticoagulation with warfarin.  Cardiology started amiodarone. The patient was successfully extubated on 2/8.  Now being treated for group A cellulitis with IV antibiotics.  On 2/10 ID broadened ABX to ceftriaxone plus Flagyl till 2/20, with repeat Ct bgative for abscess. CPK downtrending , warfarin switched to lovenox in prep for midline, yesterday evening switching back    * Bacteremia in the setting of Cellulitis of right lower extremity- (present on admission)  Assessment & Plan  Blood cultures 2/4 grew GAS, with repeat blood cultures negative.   ID following.  Recommends 14 days of antibiotics from 2/6- 2/20 with ceftriaxone and Flagyl, as blood cell count has remained high despite adequate IV antibiotics. Cannot  Switch PO abx given bacteremia indication /valve  CT leg negative for abscess.   Blood cultures negative so far from 2/11 (as well as 2/10 and 2/6)  Pain: oxy 5-10   Zofran prn for emesis  Procal downtrended to .30  WBC downtrending, CTM        Continue wound care       Midline being placed today       Lovenox >coumadin bridge to follow    Right Upper Extremity Swelling and Warmth      U/S ordered of RUE; of note is not suspecting clot formation as on lovenox,         more likely IV infiltrated       IV access and lab draws on LUE only please (if possible)      Picc line decisions  pending, CM talking to staff about this but       U/S of RUE indicated cephalic vein thrombus (small, nonocclusive), no DVT      Warm compresses    Fluid overload secondary to IV fluids   Assessment & Plan  Resolved      Atrial fibrillation with RVR ([facility])- (present on admission)  Assessment & Plan  On telemetry, ventricularly paced rhythm   Amiodarone therapy started by cardiology.  Anticoagulation updated from warfarin to lovenox  Patient has pacemaker for sick sinus syndrome  Continue metoprolol XL      Sepsis ([facility])- (present on admission)  Assessment & Plan  Resolving  Severe Sepsis Present on admission, with respiratory failure and EBEN  Patient met 3/4 SIRS criteria on admission  Admission WBC 28.8 -> now trended down since admission.  Source of infection is left lower extremity cellulitis  Sepsis protocol initiated.  Patient received IV fluids and antibiotics  Patient no longer shows signs of endorgan damage  Echo and KD normal, no vegetation  Continue ceftriaxone  C diff negative    COPD (chronic obstructive pulmonary disease) ([facility])- (present on admission)  Assessment & Plan  Not in exacerbation  18-pack-year history  Patient now on ipratropium nebulizer    Type 2 diabetes mellitus without complication, without long-term current use of insulin ([facility])- (present on admission)  Assessment & Plan  Recent A1c 7.6  Patient is not on home insulin  SSI lispro  Likely will not need insulin therapy at home    Leukocytosis with bandemia  Assessment & Plan  -See cellulitis plan    Lactic acid acidosis- (present on admission)  Assessment & Plan  Resolved  Secondary to tissue ischemia in setting of severe sepsis.    Paroxysmal SVT (supraventricular tachycardia) ([facility])- (present on admission)  Assessment & Plan  Initially noted to be in supraventricular tachycardia changing to A. fib with RVR.  Refer to afib plan.    Acute respiratory failure with hypoxia ([facility])- (present on admission)  Assessment & Plan  Resolved  Likely  combination of sepsis, A. fib RVR  Now saturating 93 to 95% on room air  Incentive spirometer    Gout Flare      -possible that this could be from seeding in setting of bacteremia;when discharges, if this is to return he will need to have joint tapped      -Was previously startedon colchicine and home allopurinol.           Code status: Full code  Diet: Cardiac diabetic  Lines/ tubes/ drains: Condom cath  IV fluids: None  GI ppx: MiraLAX stopped.  DVT ppx: Lovenox>transition back to coumadin for discharge  SNF referral placed

## 2021-02-17 NOTE — PROGRESS NOTES
Pt dc'd in stable coundition. IV and monitor removed; monitor room notified. Pt left unit via wheelchair with transport. Personal belongings with pt when leaving unit. Pt given discharge instructions prior to leaving unit including where to  prescriptions and when to follow-up; verbalizes understanding. Copy of discharge instructions with pt and in the chart. Unable to obtain paper script for controlled substances, physician entered order for pain medication in [facility]'s pharmacy service.

## 2021-02-17 NOTE — NON-PROVIDER
"      Internal Medicine Daily Progress Note  Note Author: Opal JONES Student    Date of Service: 2021  Date of Admission: 2021   Primary Team: [facility] IM Yellow Team   Attending: Dr. Nagel  Resident: Dr. Darrin Olivier Morales Avila     1955   Age/Sex 65 y.o. male   MRN [id number]   Code Status FULL     Reason for interval visit  Cellulitis of right lower extremity    Morales is a 65 y.o. male with history of A fib on Warfarin with pacemaker, type 2 DM, mechanical mitral/aortic valve replacement, and gout who presented on 2021 with sepsis secondary to right lower extremity cellulitis. At admission, he had an elevated lactate of 3.7, white count of 28.8, troponin of 20, BNP of 1818, and acute kidney injury. He was intubated after failing BiPAP and transferred to the ICU. His blood cultures from  grew group A strep. He was started on penicillin G. On , blood cultures were negative and have been since then. He was extubated on . On 2/10 he had a CT of his RLE that was negative for abscess. Later, had an episode of A fib with RVR and cardiology started him on Amiodarone. On 2/10, infectious disease broadened his antibiotics to ceftriaxone and flagyl, secondary to continued white count elevation. He had a midline placed in his L upper arm on 2/15. Scheduled to finish antibiotics on . Going to SNF for rehab secondary to physical deconditioning during hospital stay and body habitus.     Interval Update:   Today patient reports he feels \"crappy\". States that he didn't sleep well and currently feels nauseated. Denies vomiting. He states that his R arm is bugging him the most, and has a diffuse ache 7/10 In severity. We have been avoiding blood draws/IV's in this arm secondary to large hematoma and thrombus of cephalic vein, likely due to IV infiltration.   -Patient last had RLE bandage changed yesterday, 2/15  -Given Zofran this morning for nausea  -Discharge today to SNF for " rehab    Consultants/Specialty:  Cardiology  ID  Critical Care    ROS:  ROS  Constitutional: Positive for malaise/fatigue.    Cardiovascular: Negative for chest pain.   Gastrointestinal: Negative for abdominal pain and vomiting. Positive nausea.   Skin: Reports itching diffusely. Denies rash.      Physical Exam   Temp:  [36.3 °C (97.3 °F)-36.6 °C (97.9 °F)] (P) 36.5 °C (97.7 °F)  Pulse:  [80-81] 81  Resp:  [19-20] 20  BP: (106-121)/(67-73) 121/68  SpO2:  [94 %-96 %] 94 %    Body mass index is 35.37 kg/m².      Physical Exam  Physical Exam  Gen: Tired, well appearing, obese male, in NAD, lying comfortably in bed  HEENT: Normocephalic, atraumatic  Cardio: RRR. No rubs, murmurs or gallops. No JVD  Pulm: No respiratory distress. Lungs CTA bilaterally. No wheezing or crackles  Abd:  ABD soft, nontender, no distension.   MSK: Normal ROM in all 4 extremities. Distal pulses intact. Brisk capillary refill.   Skin: Pink/warm/dry, no rashes or lesions observed. Clean bandage covering RLE last changed yesterday. Large hematoma on R forearm with diffuse swelling. Medial L upper arm with midline IV in place.  Neuro: No focal deficits  Psych: Normal affect, responds and interacts appropriately    Labs:    CBC (2/15):  -WBC: 12.1, down trending   -H/H: 10.2/31/1    CMP (2/15):  -Hyponatremia: 131, improving    -INR: 1.37  -PT: 17.3    -POC Glucose: 134, 78, 111, 110    Imaging:      -Doppler US RUE (2/15): small non occlusive superficial thrombus is visualized in the cephalic    vein at the proximal forearm. No DVT.     -CT RLE (2/10): Cellulitis still present, no abscess.     -EKG (2/14): Rate: 80,  Ventricular paced rhythm, QTc: 544.     Assessment/Plan   Morales is a 65 y.o. male with history of A fib on Warfarin with pacemaker, type 2 DM, mechanical mitral/aortic valve replacement, and gout who presented on 2/4/2021 with sepsis secondary to right lower extremity cellulitis. At admission, he had an elevated lactate of 3.7, white  count of 28.8, troponin of 20, BNP of 1818, and acute kidney injury. He was intubated after failing BiPAP and transferred to the ICU. His blood cultures from 2/04 grew group A strep. He was started on penicillin G. On 2/06, blood cultures were negative and have been since then. He was extubated on 2/08. On 2/10 he had a CT of his RLE that was negative for abscess. Later, had an episode of A fib with RVR and cardiology started him on Amiodarone on 2/08. On 2/10, infectious disease broadened his antibiotics to ceftriaxone and flagyl, secondary to continued white count elevation. He had a midline placed in his L upper arm on 2/15. Scheduled to finish antibiotics on 2/20. Going to a SNF for rehab secondary to physical deconditioning during hospital stay and body habitus. Discontinued Amiodarone and bridging back to Warfarin.     #Bacteremia secondary to RLE Cellulitis  -Patient's white count is down trending, 12.1 today. Was 15.3 three days ago. No abscess of RLE on CT 2/10. Pt remains afebrile, and continues to improve.   -Patient had midline placed yesterday for continued IV antibiotics. Scheduled to administer until 2/20. Pt is on Ceftriaxone and flagyl. Has been having increased nausea. ID recommended stopping Flagyl.   -Discontinue Flagyl   -Pt going to SNF for continuation of IV antibiotics and rehab for physical deconditioning    #Gout flare  -Patient has history of gout. Flare in R elbow and L knee.   -Continue Allopurinol and Colchicine    #Atrial fibrillation  -Patient on Warfarin for A fib. Has pacemaker in place from previous sick sinus syndrome.   -Had bridged to Lovonox for midline placement yesterday, 2/15. Now transitioning back to Warfarin.  -Stopped Amiodarone    #Type 2 diabetes mellitus   -Pt's most recent A1C during hospital stay was 7.6. Blood sugars have been well controlled during stay with insulin.   -On metformin at home for DM  -Continue metformin and blood sugar checks    No new Assessment &  Plan notes have been filed under this hospital service since the last note was generated.  Service: Internal Medicine        Quality Measures  Quality-Core Measures    Opal JONES Student

## 2021-02-25 NOTE — TELEPHONE ENCOUNTER
Pt is currently in residence at [facility]    Brandi Howard, Clinical Pharmacist, CDE, CACP

## 2021-03-12 PROBLEM — S06.5XAA SUBDURAL HEMATOMA, ACUTE (HCC): Status: ACTIVE | Noted: 2021-01-01

## 2021-03-12 PROBLEM — Z95.2 HISTORY OF PROSTHETIC MITRAL VALVE: Status: ACTIVE | Noted: 2021-01-01

## 2021-03-12 NOTE — ED TRIAGE NOTES
.  Chief Complaint   Patient presents with   • ALOC   • Possible Stroke   Pt BIB EMS from home.  Pt found down(UNK down time) supine by EMS.  Per EMS, Pt recently discharged from rehab for cellulitis LLE.  Pt's friend has been trying to contact him for approximately 3 days.  + slurred speech.  Pt alert and oriented to name only at this time.   Pt has right-sided deficits.  Decreased LUE .  Weak BLE push/pulls.  Pt responds to verbal questions with one word responses.  No facial droop.  FSBS . Per EMS Pt taking Coumadin for Afib.

## 2021-03-12 NOTE — ED NOTES
Traction at bedside for bladder scan. Per traction tech 450 mL urine in bladder. Urinal remains at bedside. Pt medicated per MAR.

## 2021-03-12 NOTE — H&P
History & Physical Note    Date of Admission: 3/12/2021  Admission Status: Emergency  [facility] Team: [facility] Gold Team  Attending: No att. providers found   Senior Resident: Dr. Encarnacion  Intern: Dr. CESPEDES  Contact Number: [phone]    Chief Complaint:      History of Present Illness (HPI):  Hx obtained from chart review/sign out as patient unable to provbarrie Nielsen is a 65 y.o. male who presented 3/12/2021 to hospital after being found down by a friend who had not heard from the patient    Review of Systems:   Review of Systems   Unable to perform ROS: Mental acuity       Past Medical History:   Past Medical History was not reviewed with patient.   has a past medical history of Atrial fibrillation (HCC), Back pain, Bronchitis, CAD (coronary artery disease), COPD (chronic obstructive pulmonary disease) (HCC), Gout, Heart valve disease, Hypertension, Kidney stone, Obesity, Open wound (9/2/2009), Pacemaker, Personal history of venous thrombosis and embolism (2009), PVC (premature ventricular contraction) (4/13/2019), Renal disorder, and Type II or unspecified type diabetes mellitus without mention of complication, not stated as uncontrolled.    Past Surgical History: Past Surgical History was not reviewed with patient.   has a past surgical history that includes mitral valve replace (3/14/08); maze procedure (3/14/08); angiogram (7/3/2009); angiogram (7/4/2009); cath removal (7/4/2009); thrombectomy (7/4/2009); embolectomy (7/4/2009); irrigation & debridement general (7/20/2009); wide excision (7/20/2009); other cardiac surgery (2008); other abdominal surgery; mitral valve replace (3/8/2017); lopez (3/8/2017); and aortic valve replacement.    Medications: Medications have been not reviewed with patient.  Prior to Admission Medications   Prescriptions Last Dose Informant Patient Reported? Taking?   Pain Pump (PATIENT SUPPLIED) XX SERGEY   Yes Yes   Sig: Inject  as directed continuous.   allopurinol (ZYLOPRIM) 100 MG Tab UNK at  [facility] Patient's Home Pharmacy Yes No   Sig: Take 100 mg by mouth every morning.   benzonatate (TESSALON) 100 MG Cap NOT TAKING at NOT TAKING Patient's Home Pharmacy No No   Sig: Take 1 Cap by mouth 3 times a day as needed for Cough.   Patient not taking: Reported on 2/4/2021   cetirizine (ZYRTEC) 10 MG Tab UNK at [facility] Patient's Home Pharmacy Yes No   Sig: Take 10 mg by mouth every morning.   hydrOXYzine HCl (ATARAX) 25 MG Tab UNK at [facility] Patient's Home Pharmacy No No   Sig: Take 1 Tab by mouth 3 times a day as needed for Itching.   lisinopril (PRINIVIL) 2.5 MG Tab UNK at [facility] Patient's Home Pharmacy Yes No   Sig: Take 2.5 mg by mouth every evening.   metFORMIN (GLUCOPHAGE) 500 MG Tab UNK at [facility] Patient's Home Pharmacy Yes No   Sig: Take 500 mg by mouth 2 times a day, with meals.   metoprolol SR (TOPROL XL) 50 MG TABLET SR 24 HR UNK at [facility] Patient's Home Pharmacy Yes No   Sig: Take 50 mg by mouth every morning.   multivitamin (THERAGRAN) Tab UNK at [facility] Patient's Home Pharmacy No No   Sig: Take 1 tablet by mouth every day.   tamsulosin (FLOMAX) 0.4 MG capsule UNK at [facility] Patient's Home Pharmacy No No   Sig: TAKE 1 CAPSULE BY MOUTH EVERY DAY   warfarin (COUMADIN) 3 MG Tab UNK at [facility] Patient's Home Pharmacy No No   Sig: Take 0.5 Tablets by mouth every day at 6 PM for 30 days.      Facility-Administered Medications: None        Allergies: Allergies have been not reviewed with patient.  No Known Allergies    Family History:   Unable to obtain further hx due to ALOC.  family history includes Diabetes in his mother; Lung Disease in his father.     Social History:     Unable to obtain due to patient's ALOC.    Tobacco:    Alcohol:  Recreational drugs (illegal and prescription):    Employment:   Activity Level:   Living situation:    Recent travel:    Primary Care Provider: not reviewed Yelena Haney P.A.-C.  Other (stressors, spirituality, exposures):      Physical Exam:   Vitals:  Pulse:  [80] 80  Resp:  [20-27] 20  BP:  (135-147)/(79-91) 143/91  SpO2:  [97 %-98 %] 97 %    Physical Exam  Constitutional:       General: He is not in acute distress.     Appearance: He is ill-appearing. He is not toxic-appearing or diaphoretic.   HENT:      Head: Normocephalic and atraumatic.      Right Ear: External ear normal.      Left Ear: External ear normal.      Nose: Nose normal. No congestion or rhinorrhea.      Mouth/Throat:      Mouth: Mucous membranes are dry.      Pharynx: No oropharyngeal exudate.      Comments: Poor dentition.  Eyes:      General: No scleral icterus.        Right eye: No discharge.         Left eye: No discharge.      Extraocular Movements: Extraocular movements intact.      Conjunctiva/sclera: Conjunctivae normal.      Pupils: Pupils are equal, round, and reactive to light.      Comments: Pinpoint pupils approximately 1 mm bilaterally.   Cardiovascular:      Rate and Rhythm: Normal rate and regular rhythm.      Heart sounds: No murmur. No friction rub. No gallop.    Pulmonary:      Effort: Pulmonary effort is normal.      Breath sounds: Normal breath sounds.   Abdominal:      General: Bowel sounds are normal. There is no distension.      Palpations: Abdomen is soft. There is mass.      Tenderness: There is abdominal tenderness. There is no guarding or rebound.      Comments: Centrally located firm mass that is mobile, smooth, located superolaterally to the left of the umbilicus.  Surgical scar just inferior to this mass.  Multiple hematomas diffusely across the the abdomen (consistent with subcutaneous injection)   Genitourinary:     Penis: Normal.    Musculoskeletal:         General: No swelling or tenderness.      Cervical back: Neck supple. No tenderness.      Right lower leg: No edema.      Left lower leg: No edema.   Skin:     General: Skin is warm and dry.      Findings: Erythema and rash present.      Comments: Scabbing of right lower extremity distal tibia.  No significant erythema, or open weeping wounds.    Neurological:      Mental Status: He is alert.           Labs:     Leukocytosis with neutrophilia, but no bandemia  Chronic hyponatremia with sodium of 131 (15th February sodium 131).  Mild hyperglycemia    INR 2.84  SARS-CoV-2 negative.    EKG: Per my read is ventricularly paced rhythm without significant change from prior.    Imaging:     Left frontoparietal subdural hematoma with left to right midline shift of ~4mm.  Chest x-ray with small right-sided pleural effusion improved from prior CXR in February.    Previous Data Review: reviewed    * Subdural hematoma, acute (HCC)  Assessment & Plan  Unclear trend of events that led to this issue.  Patient was found down by his friend at home prior to presentation, and past admission in February patient was also noted to be found down.  Patient is on warfarin for mitral valve bioprosthesis. Does not follow very well for exam limiting utility of exam. R side does seem weaker.    Goal SBP < 140 mmHg.  Neurochecks every hour.  Maintain normothermia via acetaminophen.  Normalize sodium judiciously.    Case discussed with Dr. Hudson.  Dexamethasone 4mg  v1fiupf.  Levetiracetam 500 mg bid.  Ct head w/coronal and sagital reconstruction without contrast.  Coags in the AM.    History of prosthetic mitral valve  Assessment & Plan  Bioprosthetic.  INR goal would be 2.5-3.5.    Hold warfarin.  Will need further evaluation of risk:benefit in the long term. Mitral valve will need anticoagulation, but now with multiple episodes of being found down and now with subdural hematoma.    COPD (chronic obstructive pulmonary disease) (HCC)- (present on admission)  Assessment & Plan  RT per protocol.    Gout- (present on admission)  Assessment & Plan  Chronic.    Hold home meds at this time.    Dehydration- (present on admission)  Assessment & Plan  Dry mucous membranes. Found down with last known well ~72 hours prior. Unknown event(s) leading to being found down. At risk for  rhabdomyolysis/EBEN.    NS 0.9 150 mL/hr.  Repeat CPK in AM.    Type 2 diabetes mellitus without complication, without long-term current use of insulin (HCC)- (present on admission)  Assessment & Plan  Hold metformin while in hospital incase need of emergent contrast studies.  Insulin sliding scale. HOLD while NPO.  Hypoglycemia protocol.    Valvular cardiomyopathy (HCC)- (present on admission)  Assessment & Plan  Continue metoprolol once ok for oral meds. Can use IV metoprolol prn.    Pacemaker  Assessment & Plan  V-paced. Cannot get MRI.    Benign prostatic hyperplasia without lower urinary tract symptoms- (present on admission)  Assessment & Plan  Continue tamsulosin 0.4 mg once taking oral.       Santiago Encarnacion M.D.

## 2021-03-12 NOTE — ASSESSMENT & PLAN NOTE
SDH and ICH  CTA & 4 vessel cerebral angiogram negative for AVM  S/p craniotomy on 03/25  Goal SBP < 140 mmHg  Keep sodium normal  Frequent neurochecks  Dexamethasone 3 mg [id number] to continue per NSG  Levetiracetam 500 mg bid for seizure prophylaxis  PT/OT - rehab

## 2021-03-12 NOTE — ED NOTES
Bedside report given to R102 RN Yojana. Pt transported on full vitals monitoring with ICU RN. Pt care transferred.

## 2021-03-12 NOTE — ASSESSMENT & PLAN NOTE
Hold metformin in hospital.  Insulin sliding scale  Hold home metformin.  Goal 100-180.  Hypoglycemia protocol.

## 2021-03-12 NOTE — ED NOTES
Report received from Teofilo AKERS. Pt resting supine in [facility] with side rails up. Medication in progress. Pt awake but confused. No agitation noted.

## 2021-03-12 NOTE — ASSESSMENT & PLAN NOTE
Denied any hx of smoking  Never formally diagnosed as per patient, not on any home medications  -continue oxygen supplementation with a goal SpO2 of 90

## 2021-03-12 NOTE — DISCHARGE PLANNING
MSW received call from bedside RN stating pt would like his sister's called and notified he is here. MSW tried to call pt's sister Domo ([phone]). No answer and VM is full. MSW tried calling pt's other sister Laurel ([phone]). No answer. MSW will continue to try to call pt's family.     MSW met with pt to complete assessment. Pt doesn't know when he left [place]. Pt states he still lives alone-[facility] apartment. Pt was having hard time answering further questions. MSW finished assessment by chart review. Pt was admitted at [facility] in February. Pt was discharged to [facility] on 2/25. Pt was found down at home after a friend was trying to get in touch with him today. Last admission pt was able to ambulate with a FWW. Pt uses [facility] for his PCP-Yelena Haney and medications. Pt has had [facility] in the past.     Care Transition Team Assessment    Information Source  Orientation : Unable to Assess  Information Given By: (Chart review)    Interdisciplinary Discharge Planning  Does Admitting Nurse Feel This Could be a Complex Discharge?: No  Primary Care Physician: ROBERT Doll  Lives with - Patient's Self Care Capacity: Alone and Able to Care For Self  Support Systems: Friends / Neighbors  Housing / Facility: 1 Story Apartment / Condo  Do You Take your Prescribed Medications Regularly: Yes  Mobility Issues: Yes  Prior Services: Home-Independent, Skilled Home Health Services  Assistance Needed: Yes  Durable Medical Equipment: Walker    Discharge Preparedness  What is your plan after discharge?: Uncertain - pending medical team collaboration  Prior Functional Level: Ambulatory, Needs Assist with Activities of Daily Living, Uses Walker    Functional Assesment  Prior Functional Level: Ambulatory, Needs Assist with Activities of Daily Living, Uses Walker    Finances  Financial Barriers to Discharge: No  Prescription Coverage: Yes    Advance Directive  Advance Directive?:  None    Discharge Risks or Barriers  Discharge risks or barriers?: Transportation, Post-acute placement / services, Lives alone, no community support  Patient risk factors: Complex medical needs, Readmission, Vulnerable adult    Anticipated Discharge Information  Discharge Address: ([facility] [address])  Discharge Contact Phone Number: [phone]

## 2021-03-12 NOTE — ED NOTES
Spoke with MRI Tech - states that patient has a pacemaker that is NOT MRI compatible. Patient unable to have MRI.

## 2021-03-12 NOTE — ASSESSMENT & PLAN NOTE
Bioprosthetic. INR goal would be 2.5 - 3.5  Received phytonadione and prothrombin complex.  Holding warfarin.Defer to NSGY for recommendation regarding resumption.

## 2021-03-12 NOTE — ED NOTES
Called into room by pt. Pt stated need to urinate. This RN assisted pt with use of urinal. Pt stated that he did urinate. No urine output noted. Bladder scan ordered.

## 2021-03-12 NOTE — ASSESSMENT & PLAN NOTE
Currently not mri compatible, will need to assess with reps.    Per chart review patient had pacemaker placed in July 2014 ([employer], model # PM 1110, serial #[id number].  The lead is a [employer], model 2088TC/52.  The lead is serial #[id number].). Per [employer] online manual this device has not been tested for MRI compatibility.

## 2021-03-12 NOTE — CONSULTS
Critical Care Consultation    Date of consult: 3/12/2021    Referring Physician  Davidson Melendez M.D.    Reason for Consultation  Subdural hemorrhage    History of Presenting Illness  65 y.o. male who presented 3/12/2021 with being found non responsive and stroke alert. He has subdural hemorrhage on imaging.  Dr Hudson from [facility] called and will assess for likely drainage.  Right side deficits, decreased left side and weak bilateral extremities.  Found by neighbor. INR elevated and given k centra.  Elevated wbc, plt 152.  Sodium 131. cpk minimal.  Ct imaging Large LEFT holohemispheric subdural hematoma with associated mass effect and 4 mm LEFT to RIGHT midline shift.    Addendum: MRI non compatible due to pacemaker per ER RN note discussion with MRI tech.  In past it has been noted to be incompatible.  Follow up with [facility].    Code Status  Full code    Review of Systems  Review of Systems   Unable to perform ROS: Critical illness       Past Medical History   has a past medical history of Atrial fibrillation (HCC), Back pain, Bronchitis, CAD (coronary artery disease), COPD (chronic obstructive pulmonary disease) (HCC), Gout, Heart valve disease, Hypertension, Kidney stone, Obesity, Open wound (9/2/2009), Pacemaker, Personal history of venous thrombosis and embolism (2009), PVC (premature ventricular contraction) (4/13/2019), Renal disorder, and Type II or unspecified type diabetes mellitus without mention of complication, not stated as uncontrolled.    Surgical History   has a past surgical history that includes mitral valve replace (3/14/08); maze procedure (3/14/08); angiogram (7/3/2009); angiogram (7/4/2009); cath removal (7/4/2009); thrombectomy (7/4/2009); embolectomy (7/4/2009); irrigation & debridement general (7/20/2009); wide excision (7/20/2009); other cardiac surgery (2008); other abdominal surgery; mitral valve replace (3/8/2017); lopez (3/8/2017); and aortic valve replacement.    Family History  family  Pharmacy Aminoglycoside Follow-Up Note  Date of Service August 3, 2020  Patient's  1957   63 year old, female    Weight (Actual): 60 kg    Indication: Mybacterium abscessus bacteremia 2020  Current Amikacin regimen:  400 mg IV q18h  Day of therapy: 10  Target goals based on conventional dosing  Goal Peak level: 20-30  Goal Trough level: <5 mg/L    Creatinine for last 3 days  2020:  8:35 AM Creatinine 0.57 mg/dL  Current estimated CrCl: Estimated Creatinine Clearance: 94.8 mL/min (based on SCr of 0.57 mg/dL).    Nephrotoxins and other renal medications (From now, onward)    Start     Dose/Rate Route Frequency Ordered Stop    20 0330  amikacin (AMIKIN) 400 mg in D5W 100 mL intermittent infusion      400 mg  over 60 Minutes Intravenous EVERY 18 HOURS 20 1254      20 0100  vancomycin 1250 mg in 0.9% NaCl 250 mL intermittent infusion 1,250 mg      1,250 mg  over 90 Minutes Intravenous EVERY 24 HOURS 20 0053          Contrast Orders - past 72 hours (72h ago, onward)    Start     Dose/Rate Route Frequency Ordered Stop    20 1415  iopamidol (ISOVUE-370) solution 81 mL      81 mL Intravenous ONCE 20 1406 20 1444        Aminoglycosides IV Administrations (past 72 hours)                   amikacin (AMIKIN) 400 mg in D5W 100 mL intermittent infusion (mg) 400 mg New Bag 20 0423     400 mg New Bag 20 1016     400 mg New Bag 20 1518     400 mg New Bag 20 2144              Aminoglycoside Levels - past 2 days  2020:  1:43 PM Amikacin Level 16 mg/L  8/3/2020:  3:23 AM Amikacin Level 4 mg/L          Interpretation of levels and current regimen:  Amikacin peak level is on the low end of goal range, however the Vd is higher than previous studies which could make peak look low. Amikacin trough level is acceptable.    Has serum creatinine changed greater than 50% in the last 72 hours: No    Urine output:  unable to determine (unmeasured  history includes Diabetes in his mother; Lung Disease in his father.    Social History   reports that he quit smoking about 40 years ago. His smoking use included cigarettes. He has a 22.50 pack-year smoking history. He has never used smokeless tobacco. He reports current drug use. Drug: Marijuana. He reports that he does not drink alcohol.    Medications  Home Medications     Reviewed by Richie Mae (Pharmacy [facility]) on 03/12/21 at 1057  Med List Status: Complete   Medication Last Dose Status   allopurinol (ZYLOPRIM) 100 MG Tab UNK Active   benzonatate (TESSALON) 100 MG Cap NOT TAKING Active   cetirizine (ZYRTEC) 10 MG Tab UNK Active   hydrOXYzine HCl (ATARAX) 25 MG Tab UNK Active   lisinopril (PRINIVIL) 2.5 MG Tab UNK Active   metFORMIN (GLUCOPHAGE) 500 MG Tab UNK Active   metoprolol SR (TOPROL XL) 50 MG TABLET SR 24 HR UNK Active   multivitamin (THERAGRAN) Tab UNK Active   tamsulosin (FLOMAX) 0.4 MG capsule UNK Active   warfarin (COUMADIN) 3 MG Tab UNK Active              Current Facility-Administered Medications   Medication Dose Route Frequency Provider Last Rate Last Admin   • prothrombin complex conc human (KCENTRA) 1000 units KIT 2,000 Units  2,000 Units Intravenous Once Davidson Melendez M.D.        And   • phytonadione (AQUA-MEPHYTON) 10 mg in NS 50 mL IVPB  10 mg Intravenous Once Davidson Melendez M.D.         Current Outpatient Medications   Medication Sig Dispense Refill   • Pain Pump (PATIENT SUPPLIED) XX SERGEY Inject  as directed continuous.     • warfarin (COUMADIN) 3 MG Tab Take 0.5 Tablets by mouth every day at 6 PM for 30 days. 15 tablet 0   • multivitamin (THERAGRAN) Tab Take 1 tablet by mouth every day. 30 tablet 0   • benzonatate (TESSALON) 100 MG Cap Take 1 Cap by mouth 3 times a day as needed for Cough. (Patient not taking: Reported on 2/4/2021) 60 Cap 0   • hydrOXYzine HCl (ATARAX) 25 MG Tab Take 1 Tab by mouth 3 times a day as needed for Itching. 30 Tab 0   • cetirizine (ZYRTEC) 10 MG  occurrences)    Renal function: Stable    Plan  1. Continue current dose    2.  Method of evaluation: 2 post dose levels    3. Pharmacy will continue to follow and check levels  as appropriate in 5-7 Days    Sarah Williamson, Pharm.D., [facility]  Pager [phone]   Tab Take 10 mg by mouth every morning.     • lisinopril (PRINIVIL) 2.5 MG Tab Take 2.5 mg by mouth every evening.     • tamsulosin (FLOMAX) 0.4 MG capsule TAKE 1 CAPSULE BY MOUTH EVERY DAY 30 Cap 0   • allopurinol (ZYLOPRIM) 100 MG Tab Take 100 mg by mouth every morning.     • metoprolol SR (TOPROL XL) 50 MG TABLET SR 24 HR Take 50 mg by mouth every morning.     • metFORMIN (GLUCOPHAGE) 500 MG Tab Take 500 mg by mouth 2 times a day, with meals.         Allergies  No Known Allergies    Vital Signs last 24 hours  Pulse:  [80] 80  Resp:  [27] 27  BP: (135)/(79) 135/79  SpO2:  [97 %] 97 %    Physical Exam  Physical Exam  Vitals and nursing note reviewed.   Constitutional:       General: He is not in acute distress.     Appearance: He is not ill-appearing, toxic-appearing or diaphoretic.   HENT:      Head: Normocephalic and atraumatic.      Nose: No congestion or rhinorrhea.      Mouth/Throat:      Mouth: Mucous membranes are moist.      Pharynx: Oropharynx is clear. No oropharyngeal exudate or posterior oropharyngeal erythema.   Eyes:      General: No scleral icterus.        Right eye: No discharge.         Left eye: No discharge.      Conjunctiva/sclera: Conjunctivae normal.      Pupils: Pupils are equal, round, and reactive to light.   Cardiovascular:      Rate and Rhythm: Normal rate and regular rhythm.      Pulses: Normal pulses.      Heart sounds: Normal heart sounds. No murmur.   Pulmonary:      Effort: No respiratory distress.      Breath sounds: No stridor. No wheezing, rhonchi or rales.   Chest:      Chest wall: No tenderness.   Abdominal:      General: There is no distension.      Palpations: There is no mass.      Tenderness: There is no abdominal tenderness. There is no guarding.   Musculoskeletal:         General: No swelling, tenderness or deformity.      Cervical back: Normal range of motion. No rigidity. No muscular tenderness.      Right lower leg: No edema.      Left lower leg: No edema.    Lymphadenopathy:      Cervical: No cervical adenopathy.   Skin:     Coloration: Skin is not jaundiced or pale.      Findings: No bruising, erythema, lesion or rash.   Neurological:      General: No focal deficit present.      Mental Status: He is oriented to person, place, and time. Mental status is at baseline.      Cranial Nerves: No cranial nerve deficit.      Sensory: No sensory deficit.      Motor: Weakness present.      Coordination: Coordination abnormal.      Gait: Gait normal.      Comments: Right > left, lower ext weakness   Psychiatric:         Behavior: Behavior normal.      Comments: For clinical condition         Fluids  No intake or output data in the 24 hours ending 03/12/21 1201    Laboratory  Recent Results (from the past 48 hour(s))   CBC WITH DIFFERENTIAL    Collection Time: 03/12/21  9:41 AM   Result Value Ref Range    WBC 15.2 (H) 4.8 - 10.8 K/uL    RBC 3.80 (L) 4.70 - 6.10 M/uL    Hemoglobin 12.6 (L) 14.0 - 18.0 g/dL    Hematocrit 37.1 (L) 42.0 - 52.0 %    MCV 97.6 81.4 - 97.8 fL    MCH 33.2 (H) 27.0 - 33.0 pg    MCHC 34.0 33.7 - 35.3 g/dL    RDW 60.2 (H) 35.9 - 50.0 fL    Platelet Count 152 (L) 164 - 446 K/uL    MPV 11.0 9.0 - 12.9 fL    Neutrophils-Polys 84.60 (H) 44.00 - 72.00 %    Lymphocytes 6.70 (L) 22.00 - 41.00 %    Monocytes 7.20 0.00 - 13.40 %    Eosinophils 0.00 0.00 - 6.90 %    Basophils 0.20 0.00 - 1.80 %    Immature Granulocytes 1.30 (H) 0.00 - 0.90 %    Nucleated RBC 0.00 /100 WBC    Neutrophils (Absolute) 12.88 (H) 1.82 - 7.42 K/uL    Lymphs (Absolute) 1.02 1.00 - 4.80 K/uL    Monos (Absolute) 1.09 (H) 0.00 - 0.85 K/uL    Eos (Absolute) 0.00 0.00 - 0.51 K/uL    Baso (Absolute) 0.03 0.00 - 0.12 K/uL    Immature Granulocytes (abs) 0.20 (H) 0.00 - 0.11 K/uL    NRBC (Absolute) 0.00 K/uL   BASIC METABOLIC PANEL    Collection Time: 03/12/21  9:41 AM   Result Value Ref Range    Sodium 131 (L) 135 - 145 mmol/L    Potassium 3.9 3.6 - 5.5 mmol/L    Chloride 98 96 - 112 mmol/L    Co2 21  20 - 33 mmol/L    Glucose 122 (H) 65 - 99 mg/dL    Bun 21 8 - 22 mg/dL    Creatinine 1.13 0.50 - 1.40 mg/dL    Calcium 8.8 8.5 - 10.5 mg/dL    Anion Gap 12.0 7.0 - 16.0   APTT    Collection Time: 21  9:41 AM   Result Value Ref Range    APTT 43.4 (H) 24.7 - 36.0 sec   PROTHROMBIN TIME (INR)    Collection Time: 21  9:41 AM   Result Value Ref Range    PT 30.7 (H) 12.0 - 14.6 sec    INR 2.84 (H) 0.87 - 1.13   CREATINE KINASE    Collection Time: 21  9:41 AM   Result Value Ref Range    CPK Total 189 (H) 0 - 154 U/L   ESTIMATED GFR    Collection Time: 21  9:41 AM   Result Value Ref Range    GFR If African American >60 >60 mL/min/1.73 m 2    GFR If Non African American >60 >60 mL/min/1.73 m 2   EKG (NOW)    Collection Time: 21 10:15 AM   Result Value Ref Range    Report       [facility] Emergency Dept[facility]    Test Date:  2021  Pt Name:    AILYN MCINTOSH                 Department: ER  MRN:        [id number]                      Room:        02  Gender:     Male                         Technician: ZO  :        [id number]                   Requested By:EFREM FITZGERALD  Order #:    [id number]                    Reading MD: EFREM FITZGERALD MD    Measurements  Intervals                                Axis  Rate:       80                           P:  WV:                                      QRS:        -59  QRSD:       152                          T:          101  QT:         484  QTc:        559    Interpretive Statements  VENTRICULAR-PACED RHYTHM  NO FURTHER ANALYSIS ATTEMPTED DUE TO PACED RHYTHM  Compared to ECG 2021 06:24:01  No significant changes  Electronically Signed On 3- 11:47:17 PST by EFREM FITZGERALD MD         Imaging  CT-HEAD W/O   Final Result      Large LEFT holohemispheric subdural hematoma with associated mass effect and 4 mm LEFT to RIGHT midline shift.      These findings were discussed with EFREM FITZGERALD on 3/12/2021 11:40 AM.       DX-CHEST-PORTABLE (1 VIEW)   Final Result      1.  Hypoinflation, improved from prior.   2.  Minimal RIGHT lung base atelectasis or scar.   3.  Minimal RIGHT pleural effusion.          Assessment/Plan  * Subdural hematoma, acute (HCC)  Assessment & Plan  Goal sbp less than 140  Dr Hudson, neurosurgery consulted, pending decision for intervention  Keep sodium normal  q 1 hour neuro checks  Airway monitoring    History of prosthetic mitral valve  Assessment & Plan  Bioprosthetic, hold ac for now, reassess later    COPD (chronic obstructive pulmonary disease) ([facility])- (present on admission)  Assessment & Plan  Not in exacerbation, no inhalers outpt    Gout- (present on admission)  Assessment & Plan  Chronic.    Hold home meds at this time.    Dehydration- (present on admission)  Assessment & Plan  IVF  Monitor cpk    Type 2 diabetes mellitus without complication, without long-term current use of insulin ([facility])- (present on admission)  Assessment & Plan  ssi  Hold home metformin  Goal 100-180    Valvular cardiomyopathy ([facility])- (present on admission)  Assessment & Plan  Ok to hold, restart on reassessment    Pacemaker  Assessment & Plan  Currently not mri compatible, will need to assess with reps.    Benign prostatic hyperplasia without lower urinary tract symptoms- (present on admission)  Assessment & Plan  Unclear compliance w meds, monitor for now      Discussed patient condition and risk of morbidity and/or mortality with RN, RT, Pharmacy, [employer] resident, Code status disscussed, Patient and neurosurgery.      The patient remains critically ill.  Acute subdural hemorrhage.  Keep sbp less than 140, nicardipine drip.  Critical care time = 45 minutes in directly providing and coordinating critical care and extensive data review.  No time overlap and excludes procedures.

## 2021-03-12 NOTE — ED NOTES
Med rec completed per pt's home pharmacy   Pt unable to participate in an interview   Unknown last dose of medications taken   Unable to verify pt's current Warfarin regiment

## 2021-03-12 NOTE — ED PROVIDER NOTES
"wED Provider Note    Scribed for Davidson Melendez M.D. by Ta Dee. 3/12/2021  9:53 AM    Primary care provider: Yelena Haney P.A.-C.  Means of arrival: EMS  History obtained from: Patient  History limited by: None    CHIEF COMPLAINT  Chief Complaint   Patient presents with   • ALOC   • Possible Stroke   • Headache       HPI  Morales Olivier is a 65 y.o. male who presents to the Emergency Department via EMS for a possible stroke and ALOC. Triage notes that the patient's friend had been trying to contact him for the past 3 days. He was found down in his home by EMS with right-sided deficits, decreased left upper extremity , and weak bilateral lower extremity push and pulls. When the patient was asked what his name was, he attempted to answer before saying \"it's frustrating\". The patient reports symptoms of headaches. Nurse notes that he does not have facial droop. No other exacerbating or alleviating factors were noted.     REVIEW OF SYSTEMS  Pertinent positives include ALOC, possible stroke, headache, right sided deficits, decreased left upper extremity , and weak bilateral lower extremity push and pulls .   Pertinent negatives include no facial droop.    All other systems reviewed and negative. See HPI for further details.       PAST MEDICAL HISTORY   has a past medical history of Atrial fibrillation (HCC), Back pain, Bronchitis, CAD (coronary artery disease), COPD (chronic obstructive pulmonary disease) (HCC), Gout, Heart valve disease, Hypertension, Kidney stone, Obesity, Open wound (9/2/2009), Pacemaker, Personal history of venous thrombosis and embolism (2009), PVC (premature ventricular contraction) (4/13/2019), Renal disorder, and Type II or unspecified type diabetes mellitus without mention of complication, not stated as uncontrolled.    SURGICAL HISTORY   has a past surgical history that includes mitral valve replace (3/14/08); maze procedure (3/14/08); angiogram (7/3/2009); angiogram " "(2009); cath removal (2009); thrombectomy (2009); embolectomy (2009); irrigation & debridement general (2009); wide excision (2009); other cardiac surgery (); other abdominal surgery; mitral valve replace (3/8/2017); lopez (3/8/2017); and aortic valve replacement.    SOCIAL HISTORY  Social History     Tobacco Use   • Smoking status: Former Smoker     Packs/day: 2.50     Years: 9.00     Pack years: 22.50     Types: Cigarettes     Quit date: 1981     Years since quittin.2   • Smokeless tobacco: Never Used   Substance Use Topics   • Alcohol use: No   • Drug use: Yes     Types: Marijuana      Social History     Substance and Sexual Activity   Drug Use Yes   • Types: Marijuana       FAMILY HISTORY  Family History   Problem Relation Age of Onset   • Diabetes Mother    • Lung Disease Father    • Heart Disease Neg Hx        CURRENT MEDICATIONS  Current Outpatient Medications   Medication Instructions   • allopurinol (ZYLOPRIM) 100 mg, Oral, EVERY MORNING   • benzonatate (TESSALON) 100 mg, Oral, 3 TIMES DAILY PRN   • cetirizine (ZYRTEC) 10 mg, Oral, EVERY MORNING   • hydrOXYzine HCl (ATARAX) 25 mg, Oral, 3 TIMES DAILY PRN   • lisinopril (PRINIVIL) 2.5 mg, Oral, EVERY EVENING   • metFORMIN (GLUCOPHAGE) 500 mg, Oral, 2 TIMES DAILY WITH MEALS   • metoprolol SR (TOPROL XL) 50 mg, Oral, EVERY MORNING   • multivitamin (THERAGRAN) Tab 1 tablet, Oral, DAILY   • tamsulosin (FLOMAX) 0.4 MG capsule TAKE 1 CAPSULE BY MOUTH EVERY DAY   • warfarin (COUMADIN) 1.5 mg, Oral, DAILY     ALLERGIES  No Known Allergies    PHYSICAL EXAM  VITAL SIGNS: /79   Pulse 80   Resp (!) 27   Ht 1.778 m (5' 10\")   Wt 99.8 kg (220 lb)   SpO2 97%   BMI 31.57 kg/m²     Nursing note and vitals reviewed.  Constitutional: Well-developed and well-nourished.  Moderate distress.   HENT: Head is normocephalic and atraumatic. Oropharynx is clear and moist without exudate or erythema.   Eyes: Pupils are equal, round, and " reactive to light. Conjunctiva are normal.   Cardiovascular: Normal rate and regular rhythm. No murmur heard. Normal radial pulses.  Pulmonary/Chest: Breath sounds normal. No wheezes or rales.   Abdominal: Soft and non-tender. No distention    Musculoskeletal: Extremities exhibit normal range of motion without edema or tenderness.   Neurological: Expressive aphasia noted.  He is able to say yes and answer some simple questions appropriately.  However generally he has difficulty expressing himself and a Broca's type aphasia.  Right-sided hemiparesis noted.  Left-sided facial droop noted.  Skin: Skin is warm and dry. No rash.   Psychiatric: Normal mood and affect. Appropriate for clinical situation.    DIAGNOSTIC STUDIES / PROCEDURES    EKG Interpretation  Interpreted by me as below    LABS  Results for orders placed or performed during the hospital encounter of 03/12/21   CBC WITH DIFFERENTIAL   Result Value Ref Range    WBC 15.2 (H) 4.8 - 10.8 K/uL    RBC 3.80 (L) 4.70 - 6.10 M/uL    Hemoglobin 12.6 (L) 14.0 - 18.0 g/dL    Hematocrit 37.1 (L) 42.0 - 52.0 %    MCV 97.6 81.4 - 97.8 fL    MCH 33.2 (H) 27.0 - 33.0 pg    MCHC 34.0 33.7 - 35.3 g/dL    RDW 60.2 (H) 35.9 - 50.0 fL    Platelet Count 152 (L) 164 - 446 K/uL    MPV 11.0 9.0 - 12.9 fL    Neutrophils-Polys 84.60 (H) 44.00 - 72.00 %    Lymphocytes 6.70 (L) 22.00 - 41.00 %    Monocytes 7.20 0.00 - 13.40 %    Eosinophils 0.00 0.00 - 6.90 %    Basophils 0.20 0.00 - 1.80 %    Immature Granulocytes 1.30 (H) 0.00 - 0.90 %    Nucleated RBC 0.00 /100 WBC    Neutrophils (Absolute) 12.88 (H) 1.82 - 7.42 K/uL    Lymphs (Absolute) 1.02 1.00 - 4.80 K/uL    Monos (Absolute) 1.09 (H) 0.00 - 0.85 K/uL    Eos (Absolute) 0.00 0.00 - 0.51 K/uL    Baso (Absolute) 0.03 0.00 - 0.12 K/uL    Immature Granulocytes (abs) 0.20 (H) 0.00 - 0.11 K/uL    NRBC (Absolute) 0.00 K/uL   BASIC METABOLIC PANEL   Result Value Ref Range    Sodium 131 (L) 135 - 145 mmol/L    Potassium 3.9 3.6 - 5.5  mmol/L    Chloride 98 96 - 112 mmol/L    Co2 21 20 - 33 mmol/L    Glucose 122 (H) 65 - 99 mg/dL    Bun 21 8 - 22 mg/dL    Creatinine 1.13 0.50 - 1.40 mg/dL    Calcium 8.8 8.5 - 10.5 mg/dL    Anion Gap 12.0 7.0 - 16.0   APTT   Result Value Ref Range    APTT 43.4 (H) 24.7 - 36.0 sec   PROTHROMBIN TIME (INR)   Result Value Ref Range    PT 30.7 (H) 12.0 - 14.6 sec    INR 2.84 (H) 0.87 - 1.13   CREATINE KINASE   Result Value Ref Range    CPK Total 189 (H) 0 - 154 U/L   ESTIMATED GFR   Result Value Ref Range    GFR If African American >60 >60 mL/min/1.73 m 2    GFR If Non African American >60 >60 mL/min/1.73 m 2   EKG (NOW)   Result Value Ref Range    Report       [facility] Emergency Dept.    Test Date:  2021  Pt Name:    AILYN MCINTOSH                 Department: ER  MRN:        [id number]                      Room:       [facility]  Gender:     Male                         Technician: ZO  :        1955                   Requested By:EFREM FITZGERALD  Order #:    [id number]                    Reading MD:    Measurements  Intervals                                Axis  Rate:       80                           P:  AR:                                      QRS:        -59  QRSD:       152                          T:          101  QT:         484  QTc:        559    Interpretive Statements  AFIB/FLUTTER AND VENTRICULAR-PACED RHYTHM  NO FURTHER ANALYSIS ATTEMPTED DUE TO PACED RHYTHM  Compared to ECG 2021 06:24:01  No significant changes       All labs reviewed by me.    RADIOLOGY  DX-CHEST-PORTABLE (1 VIEW)   Final Result      1.  Hypoinflation, improved from prior.   2.  Minimal RIGHT lung base atelectasis or scar.   3.  Minimal RIGHT pleural effusion.      CT-HEAD W/O    (Results Pending)     The radiologist's interpretation of all radiological studies have been reviewed by me.    COURSE & MEDICAL DECISION MAKING  Nursing notes, VS, PMSFHx reviewed in chart.     PPE Note: I personally  appropriate full PPE for all patient encounters during this visit, including wearing a mask and gloves. Scribe remained outside the patient's room and did not have any contact with the patient for the duration of patient encounter.      9:53 AM - Patient seen and examined at bedside. Discussed plans for hospitalization. Patient verbalizes understanding and agreement to this plan of care.  Ordered DX-chest, CT-head w/o, BMP, APTT, Prothrombin time (INR), UA culture if indicated, Creatinine kinase, CBC w/diff, and an EKG to evaluate his symptoms. The differential diagnoses include but are not limited to: CVA, intracranial hemorrhage, electrolyte abnormality, tumor or mass-effect.    10:54 AM - Reviewed the patient's labs. Patient has mild anemia, elevated white count. CK is not significantly elevated. Awaiting head CT.     11:52 AM CT scan demonstrates a subdural hematoma.  I asked pharmacy to reverse him with Kcentra and vitamin K.  Review of records seems to show the patient is anticoagulated for atrial fibrillation.  I have paged neurosurgery.  Intensivist paged.  Covid screen ordered.    On repeat evaluation, neurologic exam is unchanged..     12:01 PM I spoke with the on-call neurosurgeon Dr. Hudson.  He will come to see the patient.  In addition I spoke with the intensivist Dr. Clarke.  He will come to see the patient as well.  In addition he will have the resident team admit.    CRITICAL CARE  I provided critical care services, which included medication orders, frequent reevaluations of the patient's condition and response to treatment, ordering and reviewing test results, and discussing the case with various consultants.  The critical care time associated with the care of the patient was 35 minutes. Review chart for interventions. This time is exclusive of any other billable procedures.        DISPOSITION:  Patient will be hospitalized by Dr. Clarke and the Resident team in guarded condition.    FINAL  IMPRESSION  1. Subdural hematoma (HCC)    2. Right sided weakness    3. Expressive aphasia    4. Chronic anticoagulation    5. Altered mental status, unspecified altered mental status type          I, Ta eDe (Trisha), am scribing for, and in the presence of, Davidson Melendez M.D..    Electronically signed by: Ta Dee (Trisha), 3/12/2021    IDavidson M.D. personally performed the services described in this documentation, as scribed by Ta Dee in my presence, and it is both accurate and complete.    C    The note accurately reflects work and decisions made by me.  Davidson Melendez M.D.  3/12/2021  12:02 PM

## 2021-03-13 NOTE — PROGRESS NOTES
2 RN skin check complete with Laura RN.  Devices in place ECG leads, BP cuff, pulse ox, catheter, SCDs, 2 PIVs,.  Skin assessed under devices.  Confirmed pressure ulcers found on left heel, see media.  Right lower left and shin wound.   Left hand bruised.  L calf wound.   Left heel red, non-blanching.   Scattered abrasion on bilateral lower extremities.   Sacrum red/blanching, mepilex in place.   The following interventions in place Q2 hour turns, pillows in use for support and to float heels, low air-loss mattress, rotating monitoring devices. Will place wound consult.

## 2021-03-13 NOTE — PROGRESS NOTES
[facility] ICU Progress Note      Admit Date: 3/12/2021    Resident(s): Santiago Encarnacion M.D.   Attending:  HAO GRUBER/ Dr. Pacheco    Patient ID:    Name:  Morales Olivier   YOB: 1955  Age:  65 y.o.  male   MRN:  [id number]    Hospital Course (carried forward and updated):  Morales Olivier is a 65 y.o. male who presented to hospital after being found in his bed by his friend after not being able to get in touch with him for 3 days. Friend reports that patient had complained of a headache that started sometime around or just after SNF/rehab(?) discharge. No reported falls per friend. Does report that he requires a cane for ambulation. Normally, he is noted to be quite sharp and talks normally. On presentation to the ED patient was noted to have word finding difficulties (mostly would grunt or say yes to any vocal interaction), R sided upper > lower extremity weakness based off observation (was not participating in exam). CT head showed Left frontonparietal sudural hematoma with L to R midline shift of 4mm. Dr Hudson with neurosurgery consulted. Received prothrombin complex and phytonadione in ED. Admitted to ICU. Repeat CT head in the AM day after admission w/out significant change. Will obtain CTA head/neck due to concern for SAH.    Consultants:  Critical Care  Neurosurgery (Dr Hudson).     Interval Events:    -CTA head/neck ordered.  -Continue dexamethasone.  -Neurochecks increase to q2 hours.  -Improving R strength.  -Headaches overnight. Morphine started.      NEURO:   Alert. Still with limited vocabulary/word finding difficulty.  CARDIOVASC:  BP within goal.  RESPIRATORY:  Stable. On room air.  GI/NUTRITION:  Pending SLP. May need NG for enteral feeding.  RENAL/FLUID/LYTES: Follow sodium. Correcting appropriately.  HEME/ONC:   Hgb dropping. No signs of bleeding.  INFECTIOUS D:  Leukocytosis resolved.  ENDOCRINE:   Glucose stable.     Vitals Range last 24h:  Temp:  [36.5 °C (97.7 °F)-37 °C  (98.6 °F)] 36.6 °C (97.8 °F)  Pulse:  [80-82] 80  Resp:  [16-63] 31  BP: (116-154)/(69-92) 133/82  SpO2:  [89 %-98 %] 94 %      Intake/Output Summary (Last 24 hours) at 3/13/2021 0924  Last data filed at 3/13/2021 0800  Gross per 24 hour   Intake 2080 ml   Output 1615 ml   Net 465 ml        Review of Systems   Unable to perform ROS: Other   word finding difficulties.    PHYSICAL EXAM:  Vitals:    03/13/21 0600 03/13/21 0700 03/13/21 0800 03/13/21 0900   BP: 131/85 140/72 140/86 133/82   Pulse: 80 80 80 80   Resp: 20 (!) 28 (!) 43 (!) 31   Temp: 36.6 °C (97.8 °F)      TempSrc: Temporal      SpO2: 95% 96% 95% 94%   Weight:       Height:        Body mass index is 30.37 kg/m².    O2 therapy: Pulse Oximetry: 94 %, O2 Delivery Device: Room air w/o2 available    Date 03/13/21 0700 - 03/14/21 0659   Shift 3543-3374 5368-0538 3578-9060 24 Hour Total   INTAKE   Shift Total       OUTPUT   Urine 75   75     Output (mL) (Urethral Catheter) 75   75   Shift Total 75   75   NET -75   -75        Physical Exam   Constitutional: He is well-developed, well-nourished, and in no distress. No distress.   HENT:   Head: Normocephalic and atraumatic.   Mouth/Throat: Oropharynx is clear and moist. No oropharyngeal exudate.   Eyes: Pupils are equal, round, and reactive to light. EOM are normal. No scleral icterus.   Cardiovascular: Normal rate and regular rhythm. Exam reveals no gallop and no friction rub.   No murmur heard.  Pulmonary/Chest: Effort normal and breath sounds normal. No respiratory distress. He has no wheezes. He has no rales.   Abdominal: Bowel sounds are normal. He exhibits no distension and no mass. There is no abdominal tenderness. There is no rebound and no guarding.   Centrally located firm mass that is mobile, smooth, located superolaterally to the left of the umbilicus. No significant change overnight.    Surgical scar just inferior to this mass.  Multiple hematomas diffusely across the the abdomen (consistent with  subcutaneous injection).   Musculoskeletal:         General: No tenderness or edema.      Cervical back: Normal range of motion and neck supple.      Comments: Strength improving on R side. Able to lift R arm (4-/5). R and  strength 4/5. R leg 4/5. Left upper/lower 5/5.   Lymphadenopathy:     He has no cervical adenopathy.   Neurological: He is alert. He displays normal reflexes. No cranial nerve deficit. He exhibits normal muscle tone.   Skin: Skin is warm and dry. Rash noted. He is not diaphoretic. No erythema. No pallor.   Healing scabs of R lower extremity.   Psychiatric:   Difficult to ascertain        Meds:  • magnesium sulfate  2 g 2 g (03/13/21 0754)   • morphine injection  2-4 mg     • labetalol  10 mg     • senna-docusate  2 tablet      And   • polyethylene glycol/lytes  1 Packet      And   • magnesium hydroxide  30 mL      And   • bisacodyl  10 mg     • levETIRAcetam (Keppra) IV  500 mg Stopped (03/13/21 0578)   • dexamethasone  4 mg     • Respiratory Therapy Consult       • LORazepam  2 mg     • acetaminophen  650 mg      Or   • acetaminophen  650 mg     • MD Alert...Adult ICU Electrolyte Replacement per Pharmacy       • hydrALAZINE  10 mg     • enalaprilat  1.25 mg     • insulin regular  1-6 Units      And   • glucose  16 g      And   • dextrose 50%  50 mL     • prochlorperazine  10 mg          Procedures:    Fragoso placed 12th March.    Labs:    Anemia mildly worsening.  Leukocytosis resolved.  INR 1.16.      Imaging:    CT head repeat this AM without significant worsening.    ASSESSEMENT and PLAN:    * Subdural hematoma, acute (HCC)  Assessment & Plan  Further history from friend: After 3 days of no contact friend went to patient's house and found him lying in bed. Bed noted to have been soiled by urine.  Friend reports patient has had a headache for a week or 2 that started right around the time of discharge from either SNF/rehab.  Per friend no noted history of falls/trauma recently.    Goal SBP  < 140 mmHg.  Dr Hudson, neurosurgery consulted. CTA head/neck recommended for concern of possible SAH.  Keep sodium normal  q 2 hour neuro checks  Airway monitoring  Dexamethasone 4 mg q6 hours to continue.  Levetiracetam 500 mg bid.  SLP/PT/OT evaluations pending.    History of prosthetic mitral valve  Assessment & Plan  Bioprosthetic. INR goal would be 2.5 - 3.5  Hold warfarin.  Received phytonadione and prothrombin complex.    COPD (chronic obstructive pulmonary disease) (HCC)- (present on admission)  Assessment & Plan  Stable. Monitor.    Gout- (present on admission)  Assessment & Plan  Chronic.    Hold home meds at this time.    Dehydration- (present on admission)  Assessment & Plan  Mucous membranes moist this AM.  Pending SLP for possible diet.    Type 2 diabetes mellitus without complication, without long-term current use of insulin (HCC)- (present on admission)  Assessment & Plan  Hold metformin in hospital.  Insulin sliding scale. Hold while NPO (is not type I diabetic).  Hold home metformin.  Goal 100-180.  Hypoglycemia protocol.    Valvular cardiomyopathy (HCC)- (present on admission)  Assessment & Plan  Ok to hold, restart on reassessment    Pacemaker  Assessment & Plan  Currently not mri compatible, will need to assess with reps.    Per chart review patient had pacemaker placed in July 2014 ([employer], model # PM 1110, serial #[id number].  The lead is a [employer], model 2088TC/52.  The lead is serial #[id number].). Per [employer] online manual this device has not been tested for MRI compatibility.    Benign prostatic hyperplasia without lower urinary tract symptoms- (present on admission)  Assessment & Plan  Unclear compliance w meds, monitor for now      DISPO: To remain in ICU for close monitoring. Anticipate will need discharge to Rehab.    CODE STATUS: FULL    Quality Measures:  Feeding: Pending swallow. PO vs NG.  Analgesia: Morphine.  Sedation: NA  Thromboprophylaxis: SCDs.  Head of bed:  >30 degrees  Ulcer prophylaxis: NA.  Glycemic control: Correctional: insulin regular / Basal: na   Bowel care: bowel regimen per protocol.   Indwelling lines: PIV/grover  Deescalation of antibiotics: NA.      Santiago Encarnacion M.D.

## 2021-03-13 NOTE — PROGRESS NOTES
Dr. Duffy made aware that pt having headache and unable to take PO meds. Writer recognized rectal Tylenol but not confident that pt would be cooperative for same as pt continuously swearing at staff. Dr. Duffy made aware of same. Awaiting read response.

## 2021-03-13 NOTE — THERAPY
"Speech Language Pathology   Clinical Swallow Evaluation     Patient Name: Morales Olivier  AGE:  65 y.o., SEX:  male  Medical Record #: [id number]  Today's Date: 3/13/2021     Precautions  Precautions: (P) Fall Risk, Swallow Precautions ( See Comments)    Assessment    Patient is 65 y.o. male with a diagnosis of SDH.  Patient was noted to have word finding difficulties (mostly would grunt or say yes to any vocal interaction), R sided upper > lower extremity weakness based off observation (was not participating in exam). CT head showed Left frontonparietal sudural hematoma with L to R midline shift of 4mm.    The patient was seen on this date for a clinical swallow evaluation. Patient noted to have word-finding difficulties, mostly responding to questions with \"yeah\". The patient completed an oral mechanism exam which revealed left sided labial weakness, lingual weakness and reduced range of motion. The patient was provided with PO as listed below. Soft and bite sized trials resulted in immediate coughing, patient response of \"that's hard\" although unable to elaborate, and oral residue appreciated in R vestibule. Patient required multiple cues to complete liquid wash, dry swallow and lingual, digital sweep to clear residue. The patient consumed all other trials with no overt s/sx of aspiration. Vocal quality remained clear, swallow trigger was timely and hyolaryngeal excursion was complete upon palpation. Recommend initiation of puree diet with thin liquids provided direct supervision during PO intake with adherence to swallow strategies including upright at 90*, awake and alert, alternating bites and sips and limiting distractions.     Plan    Recommend Speech Therapy for Evaluation only for the following treatments:  Dysphagia Training.    Discharge Recommendations: (P) Recommend post-acute placement for additional speech therapy services prior to discharge home     Objective       03/13/21 1059   Oral Motor Eval  "   Is Patient Able to Complete Oral Motor Eval Yes but Impaired   Labial Function   Labial Structure At Rest Within Functional Limits   Labial Vowel Production / I /, / U / Moderate   Labial Sequence / I /, / U / Moderate   Lingual Function   Lingual Structure At Rest Within Functional Limits   Lingual Protrude Within Functional Limits   Lingual Retract Within Functional Limits   Elevate In Mouth Within Functional Limits   Elevate Outside Mouth Within Functional Limits   Lick Lips (Circular) Within Functional Limits   Jaw   Jaw Structure At Rest Within Functional Limits   Bite ([place]) Within Functional Limits   Chew (Rotary) Within Functional Limits   Velar Function   Velar Structure At Rest Within Functional Limits   / A / Prolonged Within Functional Limits   Laryngeal Function   Voice Quality Within Functional Limits   Volutional Cough Within Functional Limits   Excursion Upon Swallow Complete   Oral Food Presentation   Ice Chips Within Functional Limits   Single Swallow Mildly Thick (2) - (Nectar Thick)  Within Functional Limits   Serial Swallow Mildly Thick (2) - (Nectar Thick) Within Functional Limits   Single Swallow Thin (0) Within Functional Limits   Serial Swallow Thin (0) Within Functional Limits   Liquidised (3) Within Functional Limits   Soft & Bite-Sized (6) - (Dysphagia III) Moderate   Tracheostomy   Tracheostomy  No   Dysphagia Strategies / Recommendations   Strategies / Interventions Recommended (Yes / No) Yes   Compensatory Strategies Direct Supervision During Meals;Single Sips / Bites;No Straws   Diet / Liquid Recommendation Thin (0);Puree (4)   Medication Administration  Crush all Medications in Puree;Float Whole with Puree   Therapy Interventions Dysphagia Therapy By Speech Language Pathologist   Dysphagia Rating   Nutritional Liquid Intake Rating Scale Non thickened beverages   Nutritional Food Intake Rating Scale Total oral diet of a single consistency   Patient / Family Goals   Patient /  Family Goal #1 nods yes to water   Short Term Goals   Short Term Goal # 1 The patient will consume PU4/MT2 diet with no overt s/sx of aspiration

## 2021-03-13 NOTE — DISCHARGE PLANNING
[facility]itional [facility] Coordination     Referral from:  Dr. Clarke   Facesheet indicates: Medicare   Potential Rehab Diagnosis: NTBI  Chart review indicates patient has on going medical management and anticipated therapy needs to possibly meet inpatient rehab facility criteria with the goal of returning to community.    D/C support:    Per  note:  MSW met with pt to complete assessment. Pt doesn't know when he left [facility]. Pt states he still lives alone-[facility] apartment. Pt was having hard time answering further questions. MSW finished assessment by chart review. Pt was admitted at [facility] in February. Pt was discharged to [facility] on 2/25. Pt was found down at home after a friend was trying to get in touch with him today. Last admission pt was able to ambulate with a FWW. Pt uses [facility] for his PCP-Yelena Haney and medications. Pt has had [facility] in the past.           Physiatry consultation pending  per protocol.              Thank you for the referral.

## 2021-03-13 NOTE — PROGRESS NOTES
Received report from Oh AKERS. All questions answered. Bedside neuro assessment completed. Patient transferred from Red 02 to room R102 with ACLS RN on monitor.All patient belongings with patient. Patient assessed per OBS. Medicated per MAR. All orders followed.

## 2021-03-13 NOTE — THERAPY
Missed Therapy     Patient Name: Morales Olivier  Age:  65 y.o., Sex:  male  Medical Record #: [id number]  Today's Date: 3/13/2021       03/13/21 0811   Interdisciplinary Plan of Care Collaboration   IDT Collaboration with  Nursing   Collaboration Comments OT consult received, currently on bedrest. Will attempt OT evaluation once bedrest orders are discharged and as medically appropriate.

## 2021-03-13 NOTE — PROGRESS NOTES
3L O2 via nasal cannula applied as pt having periodic apnea periods where he SpO2 drops into the 70s. Resident aware of episodes.

## 2021-03-13 NOTE — PROGRESS NOTES
Dr. Duffy contacted again via voalte text regarding pt's headache. Previous message marked as read with no response. Pt crying in pain.

## 2021-03-13 NOTE — CARE PLAN
Problem: Communication  Goal: The ability to communicate needs accurately and effectively will improve  Outcome: PROGRESSING AS EXPECTED     Problem: Venous Thromboembolism (VTW)/Deep Vein Thrombosis (DVT) Prevention:  Goal: Patient will participate in Venous Thrombosis (VTE)/Deep Vein Thrombosis (DVT)Prevention Measures  Outcome: PROGRESSING AS EXPECTED     Problem: Pain Management  Goal: Pain level will decrease to patient's comfort goal  Outcome: PROGRESSING AS EXPECTED     Problem: Safety  Goal: Will remain free from injury  Outcome: MET     Problem: Safety  Goal: Will remain free from falls  Outcome: MET     Problem: Knowledge Deficit  Goal: Knowledge of disease process/condition, treatment plan, diagnostic tests, and medications will improve  Outcome: MET

## 2021-03-13 NOTE — CONSULTS
DATE OF SERVICE:  03/12/2021     CHIEF COMPLAINT:  Found down.     HISTORY OF PRESENT ILLNESS:  A 65-year-old gentleman who presented to the   emergency room after being found down.  His friend had been trying to contact   him for approximately 3 days.  He was found down in his home by EMS.  He has   had a significant right-sided weakness in the upper extremity and aphasia.     PAST MEDICAL HISTORY:  Remarkable for AFib, bronchitis, coronary artery   disease, chronic obstructive pulmonary disease, gout, hypertension, obesity,   pacemaker placement for atrial fibrillation and history of venous thrombosis   and embolism.  He has renal disorder and type 2 diabetes.     PAST SURGICAL HISTORY:  Remarkable for mitral valve replacement, maze   procedure and aortic valve replacement.     SOCIAL HISTORY:  Does not use alcohol, does use marijuana.     CURRENT MEDICATIONS:  Include Zyloprim, Tessalon, Zetia, Atarax, Prinivil,   Glucophage, Toprol, Theragran, Flomax and Coumadin.     PHYSICAL EXAMINATION:    GENERAL:  The patient is alert.  He perseverates.  NEUROLOGIC:  Pupils are equal, round and reactive to light.  He does follow   commands.  HEENT:  Normocephalic and atraumatic.  Eyes:  Pupils equal, round, reactive to   light.  Conjunctivae are normal.  CARDIOVASCULAR:  Heart is regular rate and rhythm.  ABDOMEN:  Soft.  NEUROLOGIC:  Expressive aphasia, right upper extremity paresis graded at 2/5,   right lower extremity paresis graded at 4-/5.  Moves the left side arm and leg   without difficulty.     LABORATORY DATA:  As noted in the chart with a white count of 15.2 and H and H   of 12.6 and 37.1.  Sodium is 131.  His PTT is 43.4.     CT examination shows a left-sided subdural hematoma but also possibly left   frontal contusion.  We can get an MRI to further evaluate this, but the lesion   looked somewhat abnormal.  The mass effect is not as great as I would think   and I think his aphasia is possibly secondary to this  contusion.     IMPRESSION AND PLAN:  Subdural hematoma with possible frontal lobe contusion.    Thee patient is on chronic anticoagulation.     He needs to be reversed as far as his Coumadin and I think he has been given   Kcentra and vitamin K.     We attempted to get an MRI examination but we will get a CT examination with   coronal and sagittal reconstructions to further define his lesion.  At this   point in time, I am going to hold on surgical intervention and we will observe   him in the intensive care unit.  He needs to be stabilized, I am going to   follow his coags and he may eventually need surgical intervention.        ______________________________  MD AMADOU XIE/KOLE    DD:  03/12/2021 20:15  DT:  03/12/2021 20:46    Job#:  [id number]

## 2021-03-13 NOTE — PROGRESS NOTES
Neurosurgery Progress Note    Subjective:  Patient admitted yesterday with aphasia and right hemiparesis. Was found in bed this way. No direct history of trauma.  This am he is more awake. Responds appropriately. May have some baseline dementia.       Exam:  Alert  Oriented x 2  Perrl  Follows x 4 with out drift - a definite change and improvement from yesterday      BP  Min: 116/70  Max: 154/91  Pulse  Av  Min: 80  Max: 82  Resp  Av.7  Min: 16  Max: 63  Temp  Av.7 °C (98.1 °F)  Min: 36.5 °C (97.7 °F)  Max: 37 °C (98.6 °F)  SpO2  Av.9 %  Min: 89 %  Max: 98 %    No data recorded    Recent Labs     21   WBC 15.2*  --  7.6   RBC 3.80*  --  3.35*   HEMOGLOBIN 12.6* 11.7* 10.9*   HEMATOCRIT 37.1* 34.9* 32.4*   MCV 97.6  --  96.7   MCH 33.2*  --  32.5   MCHC 34.0  --  33.6*   RDW 60.2*  --  58.2*   PLATELETCT 152*  --  132*   MPV 11.0  --  11.6     Recent Labs     21  0010 21  06   SODIUM 131*   < > 135 135 132*   POTASSIUM 3.9  --   --  4.1  --    CHLORIDE 98  --   --  105  --    CO2   --   --  18*  --    GLUCOSE 122*  --   --  151*  --    BUN   --   --  24*  --    CREATININE 1.13  --   --  0.95  --    CALCIUM 8.8  --   --  8.1*  --     < > = values in this interval not displayed.     Recent Labs     21   APTT 43.4* 26.2   INR 2.84* 1.16*     Recent Labs     21  1322   REACTMIN 4.2*   CLOTKINET 1.7   CLOTANGL 68.5   MAXCLOTS 65.0   [id number] 0.0   PRCINADP 29.7   PRCINAA 48.8       Intake/Output       21 07 - 21 0659 21 07 - 2159       Total  Total       Intake    I.V.  280  1800 2080  --  -- --    Volume (mL) (NS infusion) 280 1800 2080 -- -- --    Total Intake 280 1800 2080 -- -- --       Output    Urine  800  740 1540  --  -- --    Output (mL) (Urethral Catheter)  -- -- --    Stool   --  -- --  --  -- --    Number of Times Stooled 0 x -- 0 x -- -- --    Total Output  -- -- --       Net I/O     -520 1060 540 -- -- --            Intake/Output Summary (Last 24 hours) at 3/13/2021 0728  Last data filed at 3/13/2021 0600  Gross per 24 hour   Intake 2080 ml   Output 1540 ml   Net 540 ml       $ Bladder Scan Results (mL): 450    • morphine injection  2 mg Q2HRS PRN   • magnesium sulfate  2 g Once   • labetalol  10 mg Q4HRS PRN   • senna-docusate  2 tablet BID    And   • polyethylene glycol/lytes  1 Packet QDAY PRN    And   • magnesium hydroxide  30 mL QDAY PRN    And   • bisacodyl  10 mg QDAY PRN   • levETIRAcetam (Keppra) IV  500 mg Q12HRS   • dexamethasone  4 mg Q6HRS   • Respiratory Therapy Consult   Continuous RT   • LORazepam  2 mg Q5 MIN PRN   • acetaminophen  650 mg Q4HRS PRN    Or   • acetaminophen  650 mg Q4HRS PRN   • MD Alert...Adult ICU Electrolyte Replacement per Pharmacy   PHARMACY TO DOSE   • hydrALAZINE  10 mg Q4HRS PRN   • enalaprilat  1.25 mg Q6HRS PRN   • insulin regular  1-6 Units Q6HRS    And   • glucose  16 g Q15 MIN PRN    And   • dextrose 50%  50 mL Q15 MIN PRN   • prochlorperazine  10 mg Q6HRS PRN       Assessment and Plan:  Hospital day #2  POD #na  Prophylactic anticoagulation:no due to sdh            Patient has subdural blood in a strange location along the frontal skull base and some left fronto-temporal. Mass effect is mild to moderate. He has definitely improved since admission.   Could not get MRI due to pacemaker so will get CTA.  Coags up and with his improvement will continue steroids, go to q 2 neuro, and get CTA.

## 2021-03-13 NOTE — CARE PLAN
Problem: Communication  Goal: The ability to communicate needs accurately and effectively will improve  3/13/2021 1452 by Steffanie Carr R.N.  Outcome: PROGRESSING AS EXPECTED  3/13/2021 1451 by Steffanie Carr R.N.  Outcome: PROGRESSING AS EXPECTED     Problem: Infection  Goal: Will remain free from infection  Outcome: PROGRESSING AS EXPECTED     Problem: Bowel/Gastric:  Goal: Normal bowel function is maintained or improved  Outcome: PROGRESSING AS EXPECTED  Goal: Will not experience complications related to bowel motility  Outcome: PROGRESSING AS EXPECTED     Problem: Knowledge Deficit  Goal: Knowledge of the prescribed therapeutic regimen will improve  Outcome: PROGRESSING AS EXPECTED     Problem: Discharge Barriers/Planning  Goal: Patient's continuum of care needs will be met  Outcome: PROGRESSING AS EXPECTED     Problem: Fluid Volume:  Goal: Will maintain balanced intake and output  Outcome: PROGRESSING AS EXPECTED     Problem: Skin Integrity  Goal: Risk for impaired skin integrity will decrease  Outcome: PROGRESSING AS EXPECTED     Problem: Respiratory:  Goal: Respiratory status will improve  Outcome: PROGRESSING AS EXPECTED     Problem: Psychosocial Needs:  Goal: Level of anxiety will decrease  Outcome: PROGRESSING AS EXPECTED     Problem: Safety  Goal: Will remain free from injury  Outcome: MET  Goal: Will remain free from falls  Outcome: MET     Problem: Venous Thromboembolism (VTW)/Deep Vein Thrombosis (DVT) Prevention:  Goal: Patient will participate in Venous Thrombosis (VTE)/Deep Vein Thrombosis (DVT)Prevention Measures  3/13/2021 1452 by Steffanie Carr RMORENO.  Outcome: MET  3/13/2021 1451 by Steffanie Carr R.N.  Outcome: PROGRESSING AS EXPECTED     Problem: Knowledge Deficit  Goal: Knowledge of disease process/condition, treatment plan, diagnostic tests, and medications will improve  Outcome: MET     Problem: Pain Management  Goal: Pain level will decrease to patient's comfort  goal  3/13/2021 1452 by Steffanie Carr R.N.  Outcome: MET  3/13/2021 1451 by Steffanie Carr R.N.  Outcome: PROGRESSING AS EXPECTED

## 2021-03-14 NOTE — CARE PLAN
Problem: Infection  Goal: Will remain free from infection  Outcome: PROGRESSING AS EXPECTED     Problem: Skin Integrity  Goal: Risk for impaired skin integrity will decrease  Outcome: PROGRESSING AS EXPECTED     Problem: Respiratory:  Goal: Respiratory status will improve  Outcome: PROGRESSING AS EXPECTED     Problem: Psychosocial Needs:  Goal: Level of anxiety will decrease  Outcome: PROGRESSING AS EXPECTED     Problem: Communication  Goal: The ability to communicate needs accurately and effectively will improve  Outcome: PROGRESSING SLOWER THAN EXPECTED     Problem: Bowel/Gastric:  Goal: Normal bowel function is maintained or improved  Outcome: PROGRESSING SLOWER THAN EXPECTED

## 2021-03-14 NOTE — PROGRESS NOTES
Neurosurgery Progress Note    Subjective:  Has maintained improved baseline through out the night. No drift today. 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      Exam:  Alert  Oriented x 2  Perrl  Follows x 4 with out drift - a definite change and improvement from yesterday      BP  Min: 109/79  Max: 146/94  Pulse  Av.4  Min: 80  Max: 84  Resp  Av.8  Min: 15  Max: 66  Temp  Av.4 °C (97.5 °F)  Min: 36.2 °C (97.1 °F)  Max: 36.8 °C (98.3 °F)  SpO2  Av.5 %  Min: 92 %  Max: 96 %    No data recorded    Recent Labs     21  045   WBC 15.2*  --   --  7.6 7.0   RBC 3.80*  --   --  3.35* 3.53*   HEMOGLOBIN 12.6*   < > 11.7* 10.9* 11.6*   HEMATOCRIT 37.1*   < > 34.9* 32.4* 34.6*   MCV 97.6  --   --  96.7 98.0*   MCH 33.2*  --   --  32.5 32.9   MCHC 34.0  --   --  33.6* 33.5*   RDW 60.2*  --   --  58.2* 59.8*   PLATELETCT 152*  --   --  132* 136*   MPV 11.0  --   --  11.6 10.5    < > = values in this interval not displayed.     Recent Labs     21  0451   SODIUM 131*   < > 135 132* 135   POTASSIUM 3.9  --  4.1  --  4.4   CHLORIDE 98  --  105  --  106   CO2   --  18*  --  18*   GLUCOSE 122*  --  151*  --  165*   BUN 21  --  24*  --  30*   CREATININE 1.13  --  0.95  --  0.98   CALCIUM 8.8  --  8.1*  --  8.9    < > = values in this interval not displayed.     Recent Labs     21/21  0439 21  0451   APTT 43.4* 26.2 23.9*   INR 2.84* 1.16* 1.15*     Recent Labs     21  1322   REACTMIN 4.2*   CLOTKINET 1.7   CLOTANGL 68.5   MAXCLOTS 65.0   [id number] 0.0   PRCINADP 29.7   PRCINAA 48.8       Intake/Output       21 0700 - 21 0659 21 07 - 03/15/21 0659      1900-0659 Total [id number] Total       Intake    I.V.  50  -- 50  --  -- --    Magnesium Sulfate Volume 50 -- 50 -- -- --    Volume (mL) (NS infusion) 0  -- 0 -- -- --    Total Intake 50 -- 50 -- -- --       Output    Urine  365  0 365  100  -- 100    Number of Times Voided -- 0 x 0 x -- -- --    Number of Times Incontinent of Urine -- 2 x 2 x -- -- --    Urine Void (mL) -- -- -- 50 -- 50    Output (mL) ([REMOVED] Urethral Catheter) 365 -- 365 -- -- --    Output (mL) (External Urinary Catheter (Condom)) -- 0 0 50 -- 50    Stool  --  -- --  --  -- --    Number of Times Stooled -- -- -- 0 x -- 0 x    Total Output 365 0 365 100 -- 100       Net I/O     -315 0 -315 -100 -- -100            Intake/Output Summary (Last 24 hours) at 3/14/2021 1357  Last data filed at 3/14/2021 1000  Gross per 24 hour   Intake --   Output 290 ml   Net -290 ml       $ Bladder Scan Results (mL): 99    • insulin regular  1-6 Units 4X/DAY ACHS    And   • glucose  16 g Q15 MIN PRN    And   • dextrose 50%  50 mL Q15 MIN PRN   • dexamethasone  4 mg Q6HRS   • levETIRAcetam  500 mg Q12HRS   • oxyCODONE immediate-release  5 mg Q4HRS PRN    Or   • oxyCODONE immediate-release  10 mg Q4HRS PRN   • morphine injection  2-4 mg Q3HRS PRN   • labetalol  10 mg Q4HRS PRN   • senna-docusate  2 tablet BID    And   • polyethylene glycol/lytes  1 Packet QDAY PRN    And   • magnesium hydroxide  30 mL QDAY PRN    And   • bisacodyl  10 mg QDAY PRN   • Respiratory Therapy Consult   Continuous RT   • LORazepam  2 mg Q5 MIN PRN   • acetaminophen  650 mg Q4HRS PRN    Or   • acetaminophen  650 mg Q4HRS PRN   • MD Alert...Adult ICU Electrolyte Replacement per Pharmacy   PHARMACY TO DOSE   • hydrALAZINE  10 mg Q4HRS PRN   • enalaprilat  1.25 mg Q6HRS PRN   • prochlorperazine  10 mg Q6HRS PRN       Assessment and Plan:  Hospital day #3  POD #na    Prophylactic anticoagulation:no due to sdh            Patient has subdural blood in a strange location along the frontal skull base and some left fronto-temporal. Mass effect is mild to moderate. He has definitely improved since admission.   CTA consistent with arteriovenous  malformation. Needs 4 vessel cerebral angiogram which will be ordered tomorrow.  Coags up and with his improvement will continue steroids, go to q 2 neuro, and get CTA.

## 2021-03-14 NOTE — PROGRESS NOTES
Critical Care Progress Note    Date of admission  3/12/2021    Chief Complaint  65 y.o. male admitted 3/12/2021 with SDH/ICh    Hospital Course  No notes on file  Morales Olivier is a 65 y.o. male who presented to hospital after being found in his bed by his friend after not being able to get in touch with him for 3 days. Friend reports that patient had complained of a headache that started sometime around or just after SNF/rehab(?) discharge. No reported falls per friend. Does report that he requires a cane for ambulation. Normally, he is noted to be quite sharp and talks normally. On presentation to the ED patient was noted to have word finding difficulties (mostly would grunt or say yes to any vocal interaction), R sided upper > lower extremity weakness based off observation (was not participating in exam). CT head showed Left frontonparietal sudural hematoma with L to R midline shift of 4mm. Dr Hudson with neurosurgery consulted. Received prothrombin complex and phytonadione in ED. Admitted to ICU. Repeat CT head in the AM day after admission w/out significant change. Will obtain CTA head/neck due to concern for SAH.     Interval Problem Update  Reviewed last 24 hour events:  Afebrile  CTA with questionable malformation  NSG following  On roids per NSG  HR 80s paced  ABP 120s  GCS 14  Na 135  Adequate urineoutput      Review of Systems  Review of Systems   Unable to perform ROS: Acuity of condition        Vital Signs for last 24 hours   Temp:  [36.2 °C (97.1 °F)-36.8 °C (98.3 °F)] 36.2 °C (97.1 °F)  Pulse:  [79-84] 80  Resp:  [15-66] 20  BP: (109-155)/(72-94) 128/83  SpO2:  [92 %-96 %] 95 %    Hemodynamic parameters for last 24 hours       Respiratory Information for the last 24 hours       Physical Exam   Physical Exam  Constitutional:       Appearance: He is ill-appearing.   HENT:      Head: Normocephalic and atraumatic.      Right Ear: External ear normal.      Left Ear: External ear normal.      Nose:  Nose normal.      Mouth/Throat:      Pharynx: Oropharynx is clear.   Eyes:      Extraocular Movements: Extraocular movements intact.      Pupils: Pupils are equal, round, and reactive to light.   Cardiovascular:      Pulses: Normal pulses.      Comments: paced  Pulmonary:      Effort: No respiratory distress.   Abdominal:      General: Abdomen is flat. Bowel sounds are normal.   Musculoskeletal:      Cervical back: Normal range of motion.   Skin:     General: Skin is warm and dry.   Neurological:      Mental Status: He is alert.      Comments: GCS 15. Aphasic. Antigravity in all 4 ext.   Psychiatric:         Mood and Affect: Mood normal.         Medications  Current Facility-Administered Medications   Medication Dose Route Frequency Provider Last Rate Last Admin   • insulin regular (HumuLIN R,NovoLIN R) injection  1-6 Units Subcutaneous 4X/DAY ACHS Raj Pacheco M.D.        And   • glucose 4 g chewable tablet 16 g  16 g Oral Q15 MIN PRN Raj Pacheco M.D.        And   • dextrose 50% (D50W) injection 50 mL  50 mL Intravenous Q15 MIN PRN Raj Pacheco M.D.       • morphine (pf) 4 mg/mL injection 2-4 mg  2-4 mg Intravenous Q3HRS PRN Santiago Encarnacion M.D.   2 mg at 03/13/21 2129   • labetalol (NORMODYNE/TRANDATE) injection 10 mg  10 mg Intravenous Q4HRS PRN Santiago Encarnacion M.D.       • senna-docusate (PERICOLACE or SENOKOT S) 8.6-50 MG per tablet 2 tablet  2 tablet Oral BID Santiago Encarnacion M.D.   2 tablet at 03/14/21 0540    And   • polyethylene glycol/lytes (MIRALAX) PACKET 1 Packet  1 Packet Oral QDAY PRN Santiago Encarnacion M.D.        And   • magnesium hydroxide (MILK OF MAGNESIA) suspension 30 mL  30 mL Oral QDAY PRN Santiago Encarnacion M.D.        And   • bisacodyl (DULCOLAX) suppository 10 mg  10 mg Rectal QDAY PRN Santiago Encarnacion M.D.       • levETIRAcetam (Keppra) 500 mg in 100 mL NaCl IV premix  500 mg Intravenous Q12HRS Santiago Encarnacion M.D. 400 mL/hr at 03/14/21 0555 258  mg at 03/14/21 0541   • dexamethasone (DECADRON) injection 4 mg  4 mg Intravenous Q6HRS Santiago Encarnacion M.D.   4 mg at 03/14/21 0547   • Respiratory Therapy Consult   Nebulization Continuous RT Gabriel Clarke M.D.       • LORazepam (ATIVAN) injection 2 mg  2 mg Intravenous Q5 MIN PRN Gabriel Clarke M.D.       • acetaminophen (Tylenol) tablet 650 mg  650 mg Oral Q4HRS PRN Gabriel Clarke M.D.        Or   • acetaminophen (TYLENOL) suppository 650 mg  650 mg Rectal Q4HRS PRN Gabriel Clarke M.D.       • MD Alert...ICU Electrolyte Replacement per Pharmacy   Other PHARMACY TO DOSE Gabriel Clarke M.D.       • hydrALAZINE (APRESOLINE) injection 10 mg  10 mg Intravenous Q4HRS PRN Gabriel Clarke M.D.   10 mg at 03/13/21 1705   • enalaprilat (VASOTEC) injection 1.25 mg  1.25 mg Intravenous Q6HRS PRN Gabriel Clarke M.D.       • prochlorperazine (COMPAZINE) injection 10 mg  10 mg Intravenous Q6HRS PRN Gabriel Clarke M.D.           Fluids    Intake/Output Summary (Last 24 hours) at 3/14/2021 0632  Last data filed at 3/14/2021 0600  Gross per 24 hour   Intake 50 ml   Output 365 ml   Net -315 ml       Laboratory      Recent Labs     03/12/21  0941 03/13/21 0439   CPKTOTAL 189* 75     Recent Labs     03/12/21  0941 03/12/21  1655 03/13/21  0439 03/13/21  0627 03/14/21  0451   SODIUM 131*   < > 135 132* 135   POTASSIUM 3.9  --  4.1  --  4.4   CHLORIDE 98  --  105  --  106   CO2 21  --  18*  --  18*   BUN 21  --  24*  --  30*   CREATININE 1.13  --  0.95  --  0.98   MAGNESIUM  --   --  1.9  --  2.3   CALCIUM 8.8  --  8.1*  --  8.9    < > = values in this interval not displayed.     Recent Labs     03/12/21  0941 03/13/21 0439 03/14/21  0451   ALTSGPT  --  37 40   ASTSGOT  --  26 19   ALKPHOSPHAT  --  64 67   TBILIRUBIN  --  0.8 0.5   GLUCOSE 122* 151* 165*     Recent Labs     03/12/21  0941 03/13/21  0439 03/14/21  0451   WBC 15.2* 7.6 7.0   NEUTSPOLYS 84.60* 89.10* 87.20*   LYMPHOCYTES 6.70* 6.20* 7.70*   MONOCYTES 7.20 3.40  4.10   EOSINOPHILS 0.00 0.00 0.00   BASOPHILS 0.20 0.10 0.00   ASTSGOT  --  26 19   ALTSGPT  --  37 40   ALKPHOSPHAT  --  64 67   TBILIRUBIN  --  0.8 0.5     Recent Labs     03/12/21  0941 03/12/21  0941 03/12/21  1655 03/13/21  0439 03/14/21  0451   RBC 3.80*  --   --  3.35* 3.53*   HEMOGLOBIN 12.6*   < > 11.7* 10.9* 11.6*   HEMATOCRIT 37.1*   < > 34.9* 32.4* 34.6*   PLATELETCT 152*  --   --  132* 136*   PROTHROMBTM 30.7*  --   --  15.1* 15.0*   APTT 43.4*  --   --  26.2 23.9*   INR 2.84*  --   --  1.16* 1.15*    < > = values in this interval not displayed.       Imaging  CT:    Reviewed    Assessment/Plan  * Subdural hematoma, acute (HCC)  Assessment & Plan  SDH and ICH  CTA completed ? If vasc malformation. May need formal angio. NSG following   Goal SBP < 140 mmHg.  Dr Hudson, neurosurgery consulted.   Keep sodium normal  q 2 hour neuro checks  Airway monitoring  Dexamethasone 4 mg q6 hours to continue per NSG  Levetiracetam 500 mg bid.  SLP/PT/OT     History of prosthetic mitral valve  Assessment & Plan  Bioprosthetic. INR goal would be 2.5 - 3.5  Hold warfarin.  Received phytonadione and prothrombin complex.    COPD (chronic obstructive pulmonary disease) (HCC)- (present on admission)  Assessment & Plan  Stable. Monitor.    Gout- (present on admission)  Assessment & Plan  Chronic.    Hold home meds at this time.    Dehydration- (present on admission)  Assessment & Plan  Mucous membranes moist this AM.  Pending SLP for possible diet.    Type 2 diabetes mellitus without complication, without long-term current use of insulin (HCC)- (present on admission)  Assessment & Plan  Hold metformin in hospital.  Insulin sliding scale. Hold while NPO (is not type I diabetic).  Hold home metformin.  Goal 100-180.  Hypoglycemia protocol.    Valvular cardiomyopathy (HCC)- (present on admission)  Assessment & Plan  Ok to hold, restart on reassessment    Pacemaker  Assessment & Plan  Currently not mri compatible, will need to  assess with reps.    Per chart review patient had pacemaker placed in July 2014 ([employer], model # PM 1110, serial #[id number].  The lead is a [employer], model 2088TC/52.  The lead is serial #[id number].). Per [employer] online manual this device has not been tested for MRI compatibility.    Benign prostatic hyperplasia without lower urinary tract symptoms- (present on admission)  Assessment & Plan  Unclear compliance w meds, monitor for now       VTE:  Not Indicated  Ulcer: Not Indicated  Lines: None    I have performed a physical exam and reviewed and updated ROS and Plan today (3/14/2021). In review of yesterday's note (3/13/2021), there are no changes except as documented above.     Discussed patient condition and risk of morbidity and/or mortality with RN, RT, Pharmacy, Code status disscussed, Charge nurse / hot rounds, Patient and neurosurgery  The patient remains critically ill.  Critical care time = 32 minutes in directly providing and coordinating critical care and extensive data review.  No time overlap and excludes procedures.

## 2021-03-14 NOTE — CARE PLAN
"  Problem: Skin Integrity  Goal: Risk for impaired skin integrity will decrease  Outcome: PROGRESSING AS EXPECTED  Note: Patient encouraged to assist with turns. Extra pillows provided for turning/repositioning.     Problem: Communication  Goal: The ability to communicate needs accurately and effectively will improve  Outcome: PROGRESSING SLOWER THAN EXPECTED  Note: Patient has expressive aphasia, but answers \"yes\" and \"no\" appropriately to questions. Patient understands to utilize call light for assistance.     "

## 2021-03-15 NOTE — PROGRESS NOTES
Patient back to R-102 from IR. Bedside report, neuro check, groin check and pulse check completed with OSWALD Calvillo. Groin site soft and dressing clean, dry and intact. Pedal pulses +1. Cap refill < 3 seconds.

## 2021-03-15 NOTE — CARE PLAN
Problem: Bowel/Gastric:  Goal: Normal bowel function is maintained or improved  Outcome: PROGRESSING AS EXPECTED   NOTE: Last BM 3/15.    Problem: Urinary Elimination:  Goal: Ability to reestablish a normal urinary elimination pattern will improve  Outcome: PROGRESSING AS EXPECTED   NOTE: Patient using urinal at bedside. Adequate urine output.

## 2021-03-15 NOTE — CARE PLAN
Problem: Infection  Goal: Will remain free from infection  Outcome: PROGRESSING AS EXPECTED     Problem: Bowel/Gastric:  Goal: Normal bowel function is maintained or improved  Outcome: PROGRESSING SLOWER THAN EXPECTED  Note: No BM since admission.

## 2021-03-15 NOTE — THERAPY
"Occupational Therapy   Initial Evaluation     Patient Name: Morales Olivier  Age:  65 y.o., Sex:  male  Medical Record #: [id number]  Today's Date: 3/15/2021     Precautions  Precautions: (P) Fall Risk, Swallow Precautions ( See Comments)    Assessment  Patient is 65 y.o. male who presents to acute w/ SDH/ICH. Pt had been in bed for 3 days, had recently returned home from [place]. Pt eager to get to BSC to have BM, required mod A to txf to BSC (primarily for safety), and max A for pericare. Pt demo'd impaired balance, functional mobility, activity tolerance, and cognition. Will continue to follow.       Plan    Recommend Occupational Therapy 3 times per week until therapy goals are met for the following treatments:  Adaptive Equipment, Neuro Re-Education / Balance, Self Care/Activities of Daily Living, Therapeutic Activities and Therapeutic Exercises.    DC Equipment Recommendations: (P) Unable to determine at this time  Discharge Recommendations: (P) Recommend post-acute placement for additional occupational therapy services prior to discharge home     Subjective    \"I feel awful\"     Objective       03/15/21 1138   Total Time Spent   Total Time Spent (Mins) 30   Charge Group   OT Evaluation OT Evaluation Mod   Initial Contact Note    Initial Contact Note Order Received and Verified, Occupational Therapy Evaluation in Progress with Full Report to Follow.   Prior Living Situation   Prior Services Home-Independent;Skilled Home Health Services   Housing / Facility 1 Story Apartment / Condo   Lives with - Patient's Self Care Capacity Alone and Able to Care For Self   Comments Pt reports he lives in A apartment, close friend lives next door and can assist PRN, pt had recently returned home from [place]   Prior Level of ADL Function   Self Feeding Independent   Grooming / Hygiene Independent   Bathing Independent   Dressing Independent   Toileting Independent   Precautions   Precautions Fall Risk;Swallow " "Precautions ( See Comments)   Pain 0 - 10 Group   Therapist Pain Assessment Post Activity Pain Same as Prior to Activity;Nurse Notified  (c/o generalized pain \"everywhere\")   Cognition    Cognition / Consciousness X   Speech/ Communication Delayed Responses   Level of Consciousness Confused   Safety Awareness Impaired   Comments pleasent and cooperative, appears to have poor insight to deficts, eager to get to BS   Active ROM Upper Body   Active ROM Upper Body  WDL   Strength Upper Body   Upper Body Strength  WDL   Coordination Upper Body   Coordination WDL   Balance Assessment   Sitting Balance (Static) Fair +   Sitting Balance (Dynamic) Fair   Standing Balance (Static) Fair -   Standing Balance (Dynamic) Fair -   Weight Shift Sitting Good   Weight Shift Standing Fair   Comments w/ B UE support   Bed Mobility    Supine to Sit Minimal Assist   Sit to Supine Minimal Assist   Scooting Supervised   ADL Assessment   Grooming Minimal Assist;Seated   Lower Body Dressing Maximal Assist  (socks)   Toileting Maximal Assist  (large BM in toilet)   How much help from another person does the patient currently need...   Putting on and taking off regular lower body clothing? 2   Bathing (including washing, rinsing, and drying)? 2   Toileting, which includes using a toilet, bedpan, or urinal? 2   Putting on and taking off regular upper body clothing? 3   Taking care of personal grooming such as brushing teeth? 3   Eating meals? 3   6 Clicks Daily Activity Score 15   Functional Mobility   Sit to Stand Minimal Assist   Bed, Chair, Wheelchair Transfer Moderate Assist  (increased A for safety)   ICU Target Mobility Level   ICU Mobility - Targeted Level Level 3B   Edema / Skin Assessment   Edema / Skin  Not Assessed   Activity Tolerance   Sitting in Chair 10 min on BSC   Sitting Edge of Bed 5 min   Standing 3 min total   Patient / Family Goals   Patient / Family Goal #1 to not feel so bad   Short Term Goals   Short Term Goal # 1 Pt " will demo LB dressing using AE PRN w/ SPV   Short Term Goal # 2 Pt will demo standing grooming w/ SPV   Short Term Goal # 3 Pt will demo toilet txf w/ SPV   Short Term Goal # 4 Pt will demo toilet hygiene w/ SPV   Education Group   Role of Occupational Therapist Patient Response Patient;Acceptance;Explanation;Demonstration;Verbal Demonstration   Problem List   Problem List Decreased Active Daily Living Skills;Decreased Homemaking Skills;Decreased Functional Mobility;Decreased Activity Tolerance;Safety Awareness Deficits / Cognition;Impaired Postural Control / Balance   Anticipated Discharge Equipment and Recommendations   DC Equipment Recommendations Unable to determine at this time   Discharge Recommendations Recommend post-acute placement for additional occupational therapy services prior to discharge home   Interdisciplinary Plan of Care Collaboration   IDT Collaboration with  Nursing   Patient Position at End of Therapy In Bed  (w/ transport leaving for procedure)   Collaboration Comments report given   Session Information   Date / Session Number  3/15, 1 (1/3, 3/21)   Priority 2

## 2021-03-15 NOTE — PROGRESS NOTES
Critical Care Progress Note    Date of admission  3/12/2021    Chief Complaint  65 y.o. male admitted 3/12/2021 with SDH/ICh    Hospital Course  No notes on file  Morales Olivier is a 65 y.o. male who presented to hospital after being found in his bed by his friend after not being able to get in touch with him for 3 days. Friend reports that patient had complained of a headache that started sometime around or just after SNF/rehab(?) discharge. No reported falls per friend. Does report that he requires a cane for ambulation. Normally, he is noted to be quite sharp and talks normally. On presentation to the ED patient was noted to have word finding difficulties (mostly would grunt or say yes to any vocal interaction), R sided upper > lower extremity weakness based off observation (was not participating in exam). CT head showed Left frontonparietal sudural hematoma with L to R midline shift of 4mm. Dr Hudson with neurosurgery consulted. Received prothrombin complex and phytonadione in ED. Admitted to ICU. Repeat CT head in the AM day after admission w/out significant change. Will obtain CTA head/neck due to concern for SAH.     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   HENT: Negative for hearing loss and tinnitus.    Eyes: Negative for blurred vision and double vision.   Respiratory: Negative for cough, hemoptysis, sputum production and shortness of breath.    Cardiovascular: Negative for chest pain and palpitations.   Gastrointestinal: Negative for abdominal pain, heartburn, nausea and vomiting.   Genitourinary: Negative for  dysuria and frequency.   Musculoskeletal: Negative for myalgias and neck pain.   Skin: Negative for rash.   Neurological: Positive for headaches (generalized).   Endo/Heme/Allergies: Does not bruise/bleed easily.   Psychiatric/Behavioral: Negative for depression and suicidal ideas.        Vital Signs for last 24 hours   Temp:  [36 °C (96.8 °F)-36.6 °C (97.8 °F)] 36.6 °C (97.8 °F)  Pulse:  [80-87] 80  Resp:  [15-62] 22  BP: (115-151)/(71-95) 149/91  SpO2:  [91 %-96 %] 94 %    Hemodynamic parameters for last 24 hours       Respiratory Information for the last 24 hours       Physical Exam   Physical Exam  Constitutional:       Appearance: He is ill-appearing.   HENT:      Head: Normocephalic and atraumatic.      Right Ear: External ear normal.      Left Ear: External ear normal.      Nose: Nose normal.      Mouth/Throat:      Pharynx: Oropharynx is clear.   Eyes:      Extraocular Movements: Extraocular movements intact.      Comments: Bilateral 3mm, minimally reactive to light   Cardiovascular:      Pulses: Normal pulses.      Heart sounds: Normal heart sounds.      Comments: paced  Pulmonary:      Effort: No respiratory distress.      Breath sounds: No rales.   Abdominal:      General: Abdomen is flat. Bowel sounds are normal.   Musculoskeletal:      Cervical back: Normal range of motion.   Skin:     General: Skin is warm and dry.   Neurological:      Mental Status: He is alert.      Comments: AOX4, Mild dysathria  Able to move all extremities symmetrically   Psychiatric:         Mood and Affect: Mood normal.         Medications  Current Facility-Administered Medications   Medication Dose Route Frequency Provider Last Rate Last Admin   • levETIRAcetam (KEPPRA) 100 MG/ML solution 500 mg  500 mg Oral Q12HRS Raj Pacheco M.D.       • dexamethasone (DECADRON) tablet 4 mg  4 mg Oral Q6HRS Raj Pacheco M.D.       • insulin regular (HumuLIN R,NovoLIN R) injection  1-6 Units Subcutaneous 4X/DAY CICI Pacheco M.D.    Stopped at 03/15/21 0700    And   • glucose 4 g chewable tablet 16 g  16 g Oral Q15 MIN PRN Raj Pacheco M.D.        And   • dextrose 50% (D50W) injection 50 mL  50 mL Intravenous Q15 MIN PRN Raj Pacheco M.D.       • oxyCODONE immediate-release (ROXICODONE) tablet 5 mg  5 mg Oral Q4HRS PRN Raj Pacheco M.D.        Or   • oxyCODONE immediate release (ROXICODONE) tablet 10 mg  10 mg Oral Q4HRS PRN Raj Pacheco M.D.       • morphine (pf) 4 mg/mL injection 2-4 mg  2-4 mg Intravenous Q3HRS PRN Raj Pacheco M.D.   2 mg at 03/14/21 2024   • labetalol (NORMODYNE/TRANDATE) injection 10 mg  10 mg Intravenous Q4HRS PRN Santiago Encarnacion M.D.   10 mg at 03/15/21 0513   • senna-docusate (PERICOLACE or SENOKOT S) 8.6-50 MG per tablet 2 tablet  2 tablet Oral BID Santiago Encarnacion M.D.   Stopped at 03/15/21 0600    And   • polyethylene glycol/lytes (MIRALAX) PACKET 1 Packet  1 Packet Oral QDAY PRN Santiago Encarnacion M.D.        And   • magnesium hydroxide (MILK OF MAGNESIA) suspension 30 mL  30 mL Oral QDAY PRN Santiago Encarnacion M.D.        And   • bisacodyl (DULCOLAX) suppository 10 mg  10 mg Rectal QDAY PRN Santiago Encarnacion M.D.       • Respiratory Therapy Consult   Nebulization Continuous RT Gabriel Clarke M.D.       • LORazepam (ATIVAN) injection 2 mg  2 mg Intravenous Q5 MIN PRN Gabriel Clarke M.D.       • acetaminophen (Tylenol) tablet 650 mg  650 mg Oral Q4HRS PRN Gabriel Clarke M.D.        Or   • acetaminophen (TYLENOL) suppository 650 mg  650 mg Rectal Q4HRS PRN Gabriel Clarke M.D.       • MD Alert...ICU Electrolyte Replacement per Pharmacy   Other PHARMACY TO DOSE Gabriel Clarke M.D.       • hydrALAZINE (APRESOLINE) injection 10 mg  10 mg Intravenous Q4HRS PRN Gabriel Clarke M.D.   10 mg at 03/13/21 1705   • enalaprilat (VASOTEC) injection 1.25 mg  1.25 mg Intravenous Q6HRS PRN Raj Pacheco M.D.       • prochlorperazine (COMPAZINE) injection 10 mg  10 mg Intravenous Q6HRS PRN Gabriel  KIRTI Clarke M.D.           Fluids    Intake/Output Summary (Last 24 hours) at 3/15/2021 1128  Last data filed at 3/15/2021 1000  Gross per 24 hour   Intake 300 ml   Output 1190 ml   Net -890 ml       Laboratory      Recent Labs     03/13/21 0439   CPKTOTAL 75     Recent Labs     03/13/21 0439 03/13/21 0439 03/13/21  0627 03/14/21  0451 03/15/21  0410   SODIUM 135   < > 132* 135 136   POTASSIUM 4.1  --   --  4.4 4.3   CHLORIDE 105  --   --  106 106   CO2 18*  --   --  18* 19*   BUN 24*  --   --  30* 32*   CREATININE 0.95  --   --  0.98 0.90   MAGNESIUM 1.9  --   --  2.3 1.9   CALCIUM 8.1*  --   --  8.9 8.4*    < > = values in this interval not displayed.     Recent Labs     03/13/21  0439 03/14/21  0451 03/15/21  0410   ALTSGPT 37 40 44   ASTSGOT 26 19 23   ALKPHOSPHAT 64 67 64   TBILIRUBIN 0.8 0.5 0.4   GLUCOSE 151* 165* 155*     Recent Labs     03/13/21  0439 03/14/21  0451 03/15/21  0410   WBC 7.6 7.0 5.8   NEUTSPOLYS 89.10* 87.20* 87.40*   LYMPHOCYTES 6.20* 7.70* 6.80*   MONOCYTES 3.40 4.10 4.70   EOSINOPHILS 0.00 0.00 0.00   BASOPHILS 0.10 0.00 0.20   ASTSGOT 26 19 23   ALTSGPT 37 40 44   ALKPHOSPHAT 64 67 64   TBILIRUBIN 0.8 0.5 0.4     Recent Labs     03/13/21  0439 03/14/21  0451 03/15/21  0410   RBC 3.35* 3.53* 3.26*   HEMOGLOBIN 10.9* 11.6* 10.6*   HEMATOCRIT 32.4* 34.6* 32.1*   PLATELETCT 132* 136* 134*   PROTHROMBTM 15.1* 15.0* 16.1*   APTT 26.2 23.9* 21.5*   INR 1.16* 1.15* 1.25*       Imaging  CT:    Reviewed    Assessment/Plan  * Subdural hematoma, acute (HCC)  Assessment & Plan  SDH and ICH  CTA completed :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  Follow up on 4 vessel cerebral angiogram 3/15  Per Neurosurgery: Possible crani on Wednesday for AVM ressection   Keep sodium normal  q 2 hour neuro checks  Airway monitoring  Dexamethasone 4 mg q6 hours to continue per NSG  Levetiracetam 500 mg bid.  SLP/PT/OT     History of prosthetic mitral valve  Assessment & Plan  Bioprosthetic. INR  goal would be 2.5 - 3.5  Hold warfarin.  Received phytonadione and prothrombin complex.    COPD (chronic obstructive pulmonary disease) (HCC)- (present on admission)  Assessment & Plan  Stable. Monitor.    Gout- (present on admission)  Assessment & Plan  Chronic.    Hold home meds at this time.    Dehydration- (present on admission)  Assessment & Plan  Pureed diet per SLP    Type 2 diabetes mellitus without complication, without long-term current use of insulin (HCC)- (present on admission)  Assessment & Plan  Hold metformin in hospital.  Insulin sliding scale. Hold while NPO (is not type I diabetic).  Hold home metformin.  Goal 100-180.  Hypoglycemia protocol.    Valvular cardiomyopathy (HCC)- (present on admission)  Assessment & Plan  Ok to hold, restart on reassessment    Pacemaker  Assessment & Plan  Currently not mri compatible, will need to assess with reps.    Per chart review patient had pacemaker placed in July 2014 ([employer], model # PM 1110, serial #[id number].  The lead is a [employer], model 2088TC/52.  The lead is serial #[id number].). Per [employer] online manual this device has not been tested for MRI compatibility.    Benign prostatic hyperplasia without lower urinary tract symptoms- (present on admission)  Assessment & Plan  Unclear compliance w meds, monitor for now       VTE:  Not Indicated  Ulcer: Not Indicated  Lines: None    I have performed a physical exam and reviewed and updated ROS and Plan today (3/15/2021). In review of yesterday's note (3/14/2021), there are no changes except as documented above.     Discussed patient condition and risk of morbidity and/or mortality with RN, RT, Pharmacy, Code status disscussed, Charge nurse / hot rounds, Patient and neurosurgery.

## 2021-03-15 NOTE — PROGRESS NOTES
IR RN note    Patient to IR with ICU RN Laura and transport on monitor. Bedside report received.   Reviewed sedation orders with MD.  Timeout was performed at 1345.  Patient underwent a Cerebral Angiogram by Dr. Geiger.  Procedure site was verified by MD using imaging guidance. Consent was signed by 2 RNs and MD Geiger over the phone with sister Laurel at [phone].  IR tech Noa, Cassy and Randal assisted. Patient was placed in a Supine position.  Vitals were taken every 5 minutes and remained stable during procedure (see doc flow sheet for results).  CO2 waveform capnography was monitored throughout procedure, see chart.  Right groin access site.   A angioseal, gauze and tegaderm dressing was placed over surgical site. Report called to Laura AKERS. Pt transported by bed with RNs Karli and Yariel to R102, with monitor.   Orders reviewed with ICU RN at bedside.       Angioseal VIP 6 Fr - Right Groin  REF # [id number]  LOT # [id number]  Exp. 11/30/21

## 2021-03-15 NOTE — THERAPY
"Physical Therapy   Initial Evaluation     Patient Name: Morales Olivier  Age:  65 y.o., Sex:  male  Medical Record #: [id number]  Today's Date: 3/15/2021     Precautions: Fall Risk, Swallow Precautions ( See Comments)    Assessment  Patient is 65 y.o. male that presented to acute with L frontoparietal SDH with midline shift after being found in bed by friend. PMHx significant for gout, prosthetic mitral valve, COPD, DM2, PPM, BPH. He presented to PT with impaired balance, functional weakness, and decreased activity tolerance which are limiting his ability to safely perform mobility at [facility].  He required mod A for safety for stand step transfer EOB to BS. He required extended time on BS and transport arrived for angiogram while patient on commode. Will follow.    Plan    Recommend Physical Therapy 3 times per week until therapy goals are met for the following treatments:  Bed Mobility, Community Re-integration, Equipment, Gait Training, Manual Therapy, Neuro Re-Education / Balance, Self Care/Home Evaluation, Stair Training, Therapeutic Activities and Therapeutic Exercises    DC Equipment Recommendations: Unable to determine at this time  Discharge Recommendations: Recommend post-acute placement for additional physical therapy services prior to discharge home(dependent on progress in house)       Subjective    \"I feel terrible.\"     Objective       03/15/21 1132   Total Time Spent   Total Time Spent (Mins) 25   Charge Group   PT Evaluation PT Evaluation Low   Initial Contact Note    Initial Contact Note Order Received and Verified, Physical Therapy Evaluation in Progress with Full Report to Follow.   Precautions   Precautions Fall Risk;Swallow Precautions ( See Comments)   Vitals   O2 (LPM) 0   O2 Delivery Device None - Room Air   Pain 0 - 10 Group   Therapist Pain Assessment   (no pain complaint but reported \"I don't feel well.\")   Prior Living Situation   Prior Services None   Housing / Facility 1 Story Apartment " / Condo   Steps Into Home 0   Steps In Home 0   Equipment Owned Unable to Determine At This Time  (chart indicates SPC)   Lives with - Patient's Self Care Capacity Alone and Able to Care For Self   Comments Patient reported neighbor Gerald can assist. Based on report think he lives in [place] apartment but need to verify as patient had difficulty providing subjective   Prior Level of Functional Mobility   Bed Mobility Independent   Transfer Status Independent   Ambulation Independent   Distance Ambulation (Feet)   (community)   Assistive Devices Used Unable to Determine At This Time   Stairs Unable To Determine At This Time   History of Falls   History of Falls   (unable to ascertain; found in bed by neighbor?)   Cognition    Cognition / Consciousness X   Speech/ Communication Delayed Responses   Level of Consciousness Confused   Safety Awareness Impaired   Attention Impaired   Comments pleasant and cooperative with encouragement, appeared to be distracted by general feeling of unwell   Passive ROM Lower Body   Passive ROM Lower Body WDL   Comments not formally tested, WFL for mobility   Active ROM Lower Body    Active ROM Lower Body  WDL   Comments functional for bed mobility and transfer   Strength Lower Body   Lower Body Strength  X   Comments gross BLE weakness 3+/5   Sensation Lower Body   Lower Extremity Sensation   Not Tested   Lower Body Muscle Tone   Lower Body Muscle Tone  WDL   Neurological Concerns   Neurological Concerns Yes   Comments given cognition   Coordination Lower Body    Coordination Lower Body  WDL   Comments however delayed initiation   Balance Assessment   Sitting Balance (Static) Fair +   Sitting Balance (Dynamic) Fair   Standing Balance (Static) Fair -   Standing Balance (Dynamic) Fair -   Weight Shift Sitting Good   Weight Shift Standing Fair   Comments SBA sitting EOB, HHA during stand step transfer   Gait Analysis   Gait Level Of Assist Unable to Participate   Comments focused on using BSC,  extended time on BSC, then transport at bedside to take patient for angiogram   Bed Mobility    Supine to Sit Minimal Assist   Sit to Supine Minimal Assist   Scooting Supervised  (seated)   Functional Mobility   Sit to Stand Minimal Assist   Bed, Chair, Wheelchair Transfer Moderate Assist   Transfer Method Stand Step   ICU Target Mobility Level   ICU Mobility - Targeted Level Level 3B   How much difficulty does the patient currently have...   Turning over in bed (including adjusting bedclothes, sheets and blankets)? 2   Sitting down on and standing up from a chair with arms (e.g., wheelchair, bedside commode, etc.) 2   Moving from lying on back to sitting on the side of the bed? 2   How much help from another person does the patient currently need...   Moving to and from a bed to a chair (including a wheelchair)? 2   Need to walk in a hospital room? 2   Climbing 3-5 steps with a railing? 1   6 clicks Mobility Score 11   Activity Tolerance   Sitting in Chair 15 min on BSC   Sitting Edge of Bed 5 min   Standing 3 min total   Edema / Skin Assessment   Edema / Skin  Not Assessed   Patient / Family Goals    Patient / Family Goal #1 feel better   Short Term Goals    Short Term Goal # 1 Patient will move supine<>sitting EOB with supervision within 6tx in order to get in/out of bed   Short Term Goal # 2 Patient will move sitting<>standing with supervision within 6tx in order to initiate gait and transfers   Short Term Goal # 3 Patient will ambulate 50ft with supervision within 6tx in order to access environment   Education Group   Education Provided Role of Physical Therapist   Role of Physical Therapist Patient Response Patient;Acceptance;Explanation;Reinforcement Needed   Problem List    Problems Pain;Impaired Bed Mobility;Impaired Transfers;Impaired Ambulation;Functional Strength Deficit;Impaired Balance;Decreased Activity Tolerance;Safety Awareness Deficits / Cognition;Motor Planning / Sequencing   Anticipated Discharge  Equipment and Recommendations   DC Equipment Recommendations Unable to determine at this time   Discharge Recommendations Recommend post-acute placement for additional physical therapy services prior to discharge home  (dependent on progress in house)   Interdisciplinary Plan of Care Collaboration   IDT Collaboration with  Nursing;Occupational Therapist   Patient Position at End of Therapy In Bed  (with transport)   Collaboration Comments RN aware of visit, response   Session Information   Date / Session Number  3/15-1 (1/3, 3/21)   Priority 2

## 2021-03-15 NOTE — RESPIRATORY CARE
COPD Team Note  Patient unable to engage in educational session. Our Team will revisit when patient is more alert.

## 2021-03-15 NOTE — PROGRESS NOTES
Patient down to IR 3 via hospital bed on monitor with ACLS RN and IR tech. Report given to OSWALD Perez.

## 2021-03-15 NOTE — WOUND TEAM
[facility]  Inpatient Services  Initial Wound and Skin Care Evaluation    Admission Date: 3/12/2021     Last order of IP CONSULT TO WOUND CARE was found on 3/12/2021 from Hospital Encounter on 3/12/2021     HPI, PMH, SH: Reviewed    Past Surgical History:   Procedure Laterality Date   • MITRAL VALVE REPLACE  3/8/2017    Procedure: MITRAL VALVE REPLACE-REDO;  Surgeon: Cornelia Wright M.D.;  Location: SURGERY [facility];  Service:    • KD  3/8/2017    Procedure: KD;  Surgeon: Cornelia Wright M.D.;  Location: SURGERY [facility];  Service:    • IRRIGATION & DEBRIDEMENT GENERAL  2009    Performed by MICHEL URBINA at SURGERY [facility]   • WIDE EXCISION  2009    Performed by MICHEL URBINA at [facility]   • ANGIOGRAM  2009    Performed by MICHEL URBINA at SURGERY [facility]   • CATH REMOVAL  2009    Performed by MICHEL URBINA at SURGERY [facility]   • THROMBECTOMY  2009    Performed by MICHEL URBINA at SURGERY [facility]   • EMBOLECTOMY  2009    Performed by MICHEL URBINA at SURGERY [facility]   • ANGIOGRAM  7/3/2009    Performed by MORGAN WARD at SURGERY [facility]   • MITRAL VALVE REPLACE  3/14/08    Performed by CORNELIA WRIGHT at SURGERY [facility]   • MAZE PROCEDURE  3/14/08    Performed by CORNELIA WRIGHT at SURGERY [facility]   • OTHER CARDIAC SURGERY  2008    mitral valve replacement   • AORTIC VALVE REPLACEMENT     • OTHER ABDOMINAL SURGERY      imbulica repair      Social History     Tobacco Use   • Smoking status: Former Smoker     Packs/day: 2.50     Years: 9.00     Pack years: 22.50     Types: Cigarettes     Quit date: 1981     Years since quittin.2   • Smokeless tobacco: Never Used   Substance Use Topics   • Alcohol use: No     Chief Complaint   Patient presents with   • ALOC   • Possible Stroke   • Headache     Diagnosis: Subdural hematoma (HCC)  "[S06.5X9A]    Unit where seen by Wound Team: R102/00     WOUND CONSULT/FOLLOW UP RELATED TO:  RLE    WOUND HISTORY:  \"Unknown etiology.  Admitted with cellulitis, [facility]\"  Was seen by wound team last admit    WOUND ASSESSMENT/LDA  Wound 02/08/21 Soft Tissue Necrosis Leg Right cellulitis/vasculitis?  (Active)   Site Assessment Black    Periwound Assessment Red    Margins Attached edges    Closure Secondary intention    Drainage Amount None    Treatments Cleansed    Wound Cleansing Normal Saline Irrigation    Periwound Protectant Not Applicable    Dressing Cleansing/Solutions Not Applicable    Dressing Options Petroleum Gauze (clear);Dry Roll Gauze    Dressing Changed New    Dressing Status Intact    Dressing Change/Treatment Frequency Every 48 hrs, and As Needed    NEXT Dressing Change/Treatment Date 03/16/21    NEXT Weekly Photo (Inpatient Only) 03/19/21    Non-staged Wound Description Partial thickness    Wound Length (cm) 7 cm  Lateral 5 03/14/21 1300   Wound Width (cm) 5.5 cm  Lateral 3 03/14/21 1300   Wound Surface Area (cm^2) 38.5 cm^2 03/14/21 1300   Shape irregular    Wound Odor None    Exposed Structures None    Number of days: 34        Medial RLE      Lateral RLE      Vascular:    MARJORIE:   No results found.    Lab Values:    Lab Results   Component Value Date/Time    WBC 7.0 03/14/2021 04:51 AM    RBC 3.53 (L) 03/14/2021 04:51 AM    HEMOGLOBIN 11.6 (L) 03/14/2021 04:51 AM    HEMATOCRIT 34.6 (L) 03/14/2021 04:51 AM    CREACTPROT 3.47 (H) 12/19/2019 12:26 PM    SEDRATEWES 65 (H) 02/04/2021 09:40 PM    HBA1C 7.6 (H) 11/20/2020 09:41 AM        Culture Results show:  No results found for this or any previous visit (from the past 720 hour(s)).    Pain Level/Medicated:  C/o pain from condom cath       INTERVENTIONS BY WOUND TEAM:  Chart and images reviewed. Discussed with bedside RN. This RN in to assess patient. Performed standard wound care which includes appropriate " positioning, dressing removal and non-selective debridement.   Preparation for Dressing removal: wounds NAV  Cleansed with:  NS and gauze.  Sharp debridement: NA  Jewels wound: Cleansed with NS, Prepped with moisturizer  Primary Dressing: clear petrolatum gauze, dry roll gauze  Secondary (Outer) Dressing:none    Interdisciplinary consultation: Patient, Bedside RN    EVALUATION / RATIONALE FOR TREATMENT:  Most Recent Date:  3/14: Pt's wounds improved since he was last seen by wound care last admit. Areas that were affected are now hyperpigmented but resolved. Petrolatum used to add moisture and provide non-adherent. Moisturizer for periwound.     Goals: Steady decrease in wound area and depth weekly.    WOUND TEAM PLAN OF CARE ([X] for frequency of wound follow up,):  Nursing to follow orders written for wound care. Contact wound team if area fails to progress, deteriorates or with any questions/concerns  Dressing changes by wound team:                   Follow up 3 times weekly:                NPWT change 3 times weekly:     Follow up 1-2 times weekly:      Follow up Bi-Monthly:                   Follow up as needed:   PRN Nursing to take weekly wound photos and notify wound team if wounds deteriorate    Other (explain):     NURSING PLAN OF CARE ORDERS (X):  Dressing changes: See Dressing Care orders: x  Skin care: See Skin Care orders:   RN Prevention Protocol: x  Rectal tube care: See Rectal Tube Care orders:   Other orders:    RSKIN:   CURRENTLY IN PLACE (X), APPLIED THIS VISIT (A), ORDERED (O):   Q shift Riaz:  X  Q shift pressure point assessments:  X    Surface/Positioning   Pressure redistribution mattress            Low Airloss   x       Bariatric foam      Bariatric GUSTABO     Waffle cushion        Waffle Overlay          Reposition q 2 hours      TAPs Turning system     Z Juan Pillow     Offloading/Redistribution  Sacral Mepilex (Silicone dressing)   x  Heel Mepilex (Silicone dressing)         Heel float boots  (Prevalon boot)             Float Heels off Bed with Pillows           Respiratory   Silicone O2 tubing         Gray Foam Ear protectors     Cannula fixation Device (Tender )          High flow offloading Clip    Elastic head band offloading device      Anchorfast                                                         Trach with Optifoam split foam             Containment/Moisture Prevention     Rectal tube or BMS    Purwick/Condom Cath  x      Fragoso Catheter    Barrier wipes   x        Barrier paste       Antifungal tx      Interdry        Mobilization ANAM     Up to chair        Ambulate      PT/OT      Nutrition       Dietician        Diabetes Education      PO   x  TF     TPN     NPO   # days     Other        Anticipated discharge plans:TBD  LTACH:        SNF/Rehab:                  Home Health Care:           Outpatient Wound Center:            Self/Family Care:        Other:

## 2021-03-15 NOTE — OR SURGEON
Immediate Post- Operative Note        PostOp Diagnosis: Left Frontal intraparenchymal and subdural hemmorrhage    Procedure(s): Cerebral Arteriogram    Estimated Blood Loss: Less than 5 ml    Complications: None      3/15/2021     2:26 PM     Thor Geiger M.D.

## 2021-03-15 NOTE — PROGRESS NOTES
Neurosurgery Progress Note    Subjective:  No acute event over night    Exam:  A/OX4, PERRLA, EOM intact  Face symmt, tongue midline  BIANCHI, no pronator drift        BP  Min: 115/71  Max: 151/90  Pulse  Av.5  Min: 80  Max: 87  Resp  Av.4  Min: 16  Max: 62  Temp  Av.3 °C (97.3 °F)  Min: 36 °C (96.8 °F)  Max: 36.6 °C (97.8 °F)  SpO2  Av %  Min: 91 %  Max: 96 %    No data recorded    Recent Labs     03/13/21  0439 03/14/21  0451 03/15/21  0410   WBC 7.6 7.0 5.8   RBC 3.35* 3.53* 3.26*   HEMOGLOBIN 10.9* 11.6* 10.6*   HEMATOCRIT 32.4* 34.6* 32.1*   MCV 96.7 98.0* 98.5*   MCH 32.5 32.9 32.5   MCHC 33.6* 33.5* 33.0*   RDW 58.2* 59.8* 59.5*   PLATELETCT 132* 136* 134*   MPV 11.6 10.5 11.2     Recent Labs     03/13/21  0439 03/13/21  0439 03/13/21  0627 03/14/21  0451 03/15/21  0410   SODIUM 135   < > 132* 135 136   POTASSIUM 4.1  --   --  4.4 4.3   CHLORIDE 105  --   --  106 106   CO2 18*  --   --  18* 19*   GLUCOSE 151*  --   --  165* 155*   BUN 24*  --   --  30* 32*   CREATININE 0.95  --   --  0.98 0.90   CALCIUM 8.1*  --   --  8.9 8.4*    < > = values in this interval not displayed.     Recent Labs     03/13/21  0439 03/14/21  0451 03/15/21  0410   APTT 26.2 23.9* 21.5*   INR 1.16* 1.15* 1.25*     Recent Labs     21  1322   REACTMIN 4.2*   CLOTKINET 1.7   CLOTANGL 68.5   MAXCLOTS 65.0   [id number] 0.0   PRCINADP 29.7   PRCINAA 48.8       Intake/Output       21 07 - 03/15/21 0659 03/15/21 07 - 21 0659       Total  Total       Intake    P.O.  --  200 200  --  -- --    P.O. -- 200 200 -- -- --    IV Piggyback  --  306.7 306.7  --  -- --    Volume (mL) (levETIRAcetam (Keppra) 500 mg in 100 mL NaCl IV premix) -- 306.7 306.7 -- -- --    Total Intake -- 506.7 506.7 -- -- --       Output    Urine  1240  -- 1240  --  -- --    Number of Times Voided -- 0 x 0 x -- -- --    Number of Times Incontinent of Urine -- 2 x 2 x -- -- --    Urine Void (mL) 620 --  620 -- -- --    Output (mL) ([REMOVED] External Urinary Catheter (Condom)) 620 -- 620 -- -- --    Stool  --  -- --  --  -- --    Number of Times Stooled 0 x 0 x 0 x -- -- --    Total Output 1240 -- 1240 -- -- --       Net I/O     -1240 506.7 -733.3 -- -- --            Intake/Output Summary (Last 24 hours) at 3/15/2021 0853  Last data filed at 3/15/2021 0600  Gross per 24 hour   Intake 506.67 ml   Output 1180 ml   Net -673.33 ml            • levETIRAcetam  500 mg Q12HRS   • dexamethasone  4 mg Q6HRS   • insulin regular  1-6 Units 4X/DAY ACHS    And   • glucose  16 g Q15 MIN PRN    And   • dextrose 50%  50 mL Q15 MIN PRN   • oxyCODONE immediate-release  5 mg Q4HRS PRN    Or   • oxyCODONE immediate-release  10 mg Q4HRS PRN   • morphine injection  2-4 mg Q3HRS PRN   • labetalol  10 mg Q4HRS PRN   • senna-docusate  2 tablet BID    And   • polyethylene glycol/lytes  1 Packet QDAY PRN    And   • magnesium hydroxide  30 mL QDAY PRN    And   • bisacodyl  10 mg QDAY PRN   • Respiratory Therapy Consult   Continuous RT   • LORazepam  2 mg Q5 MIN PRN   • acetaminophen  650 mg Q4HRS PRN    Or   • acetaminophen  650 mg Q4HRS PRN   • MD Alert...Adult ICU Electrolyte Replacement per Pharmacy   PHARMACY TO DOSE   • hydrALAZINE  10 mg Q4HRS PRN   • enalaprilat  1.25 mg Q6HRS PRN   • prochlorperazine  10 mg Q6HRS PRN       Assessment and Plan:  Hospital day #4  POD #na    Prophylactic anticoagulation:no due to sdh            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    Patient has subdural blood in a strange location along the frontal skull base and some left fronto-temporal. Mass effect is mild to moderate.     CTA consistent with arteriovenous malformation.   4 vessel cerebral angiogram ordered  Continue steriod  Continue Q2hrs neuro checks  Angiogram reported as negative for avm.

## 2021-03-16 NOTE — ASSESSMENT & PLAN NOTE
Due to ongoing thrombocytopenia and as per hematology's recommendation we will hold allopurinol for now.  Patient currently receiving steroids.

## 2021-03-16 NOTE — ASSESSMENT & PLAN NOTE
Continues to have headache  Neurosurgery following and recommending continuing steroids and Keppra for seizure prophylaxis. On steroid taper  Repeat CT scan 3/22: 1.2cm extra axial hematoma (from 1cm) with 4.6mm midline shift to right slightly greater compared to prior imaging  Neurosurgery following  PT/OT/SLP following, continue therapy    S/p craniotomy 3/25  Cont Neuro checks  BP control  Sz prophylaxis  PT/OT/SLP    3/30: As per neurosurgery, patient able to be discharged to skilled nursing facility when ready.  Staples and sutures out 2 weeks postop, follow-up with [facility] neurosurgery group in 4 weeks with a new head CT as per neurosurgery note, their office will arrange it.  Neurosurgery has also increased salt tablets to 2 g 3 times daily with Florinef.    3/31: Patient seems more lethargic this morning.  Neurosurgery repeated CT scan which did not reveal acute abnormality.  Neurosurgery would like to have an MRI of the brain, patient with pacemaker we will inquire if it is compatible with MRI machine.  Neurosurgery also recommends starting the patient on Decadron 4 mg every 6 hours for now.    4/2: We have started the tapering of Decadron as per neurosurgery's recommendation.    4/8: repeat CT Head showed slight interval decrease in size and density of extra-axial hemorrhage deep to craniotomy flap. Decreased attenuation along the inferior aspect left frontal lobe could represent encephalomalacia or low attenuation subdural fluid.

## 2021-03-16 NOTE — PROGRESS NOTES
Neurosurgery Progress Note    Subjective:  No acute event over night. Pt states he feels horrible in general, some headache included. No n/v    Exam:  A/OX4, PERRLA, EOM intact  Face symmt, tongue midline  BIANCHI, no pronator drift        BP  Min: 107/69  Max: 158/96  Pulse  Av.5  Min: 59  Max: 81  Resp  Av.8  Min: 15  Max: 36  Temp  Av.3 °C (97.4 °F)  Min: 36 °C (96.8 °F)  Max: 36.7 °C (98 °F)  SpO2  Av.8 %  Min: 93 %  Max: 99 %    No data recorded    Recent Labs     03/14/21  0451 03/15/21  0410 03/16/21  0423   WBC 7.0 5.8 5.1   RBC 3.53* 3.26* 3.48*   HEMOGLOBIN 11.6* 10.6* 11.3*   HEMATOCRIT 34.6* 32.1* 34.9*   MCV 98.0* 98.5* 100.3*   MCH 32.9 32.5 32.5   MCHC 33.5* 33.0* 32.4*   RDW 59.8* 59.5* 61.5*   PLATELETCT 136* 134* 159*   MPV 10.5 11.2 11.3     Recent Labs     03/14/21  0451 03/15/21  0410 03/16/21  0423   SODIUM 135 136 136   POTASSIUM 4.4 4.3 5.0   CHLORIDE 106 106 106   CO2 18* 19* 20   GLUCOSE 165* 155* 167*   BUN 30* 32* 30*   CREATININE 0.98 0.90 0.87   CALCIUM 8.9 8.4* 8.9     Recent Labs     03/14/21  0451 03/15/21  0410 03/16/21  0423   APTT 23.9* 21.5* 23.2*   INR 1.15* 1.25* 1.22*           Intake/Output       03/15/21 0700 - 2159 21 - 2159      1900-0659 Total  Total       Intake    P.O.  120  -- 120  --  -- --    P.O. 120 -- 120 -- -- --    Total Intake 120 -- 120 -- -- --       Output    Urine  50  350 400  150  -- 150    Number of Times Voided 1 x 2 x 3 x 1 x -- 1 x    Urine Void (mL) 50 350 400 150 -- 150    Stool  --  -- --  --  -- --    Number of Times Stooled 1 x 0 x 1 x -- -- --    Total Output 50 350 400 150 -- 150       Net I/O     70 -350 -280 -150 -- -150            Intake/Output Summary (Last 24 hours) at 3/16/2021 0926  Last data filed at 3/16/2021 0800  Gross per 24 hour   Intake 120 ml   Output 550 ml   Net -430 ml       $ Bladder Scan Results (mL): 54    • lisinopril  2.5 mg Q DAY   • metoprolol  tartrate  25 mg TWICE DAILY   • levETIRAcetam  500 mg Q12HRS   • dexamethasone  4 mg Q6HRS   • insulin regular  1-6 Units 4X/DAY ACHS    And   • glucose  16 g Q15 MIN PRN    And   • dextrose 50%  50 mL Q15 MIN PRN   • oxyCODONE immediate-release  5 mg Q4HRS PRN    Or   • oxyCODONE immediate-release  10 mg Q4HRS PRN   • morphine injection  2-4 mg Q3HRS PRN   • labetalol  10 mg Q4HRS PRN   • senna-docusate  2 tablet BID    And   • polyethylene glycol/lytes  1 Packet QDAY PRN    And   • magnesium hydroxide  30 mL QDAY PRN    And   • bisacodyl  10 mg QDAY PRN   • Respiratory Therapy Consult   Continuous RT   • LORazepam  2 mg Q5 MIN PRN   • acetaminophen  650 mg Q4HRS PRN    Or   • acetaminophen  650 mg Q4HRS PRN   • MD Alert...Adult ICU Electrolyte Replacement per Pharmacy   PHARMACY TO DOSE   • hydrALAZINE  10 mg Q4HRS PRN   • enalaprilat  1.25 mg Q6HRS PRN   • prochlorperazine  10 mg Q6HRS PRN       Assessment and Plan:  Hospital day # 5  POD # 0   Subdural blood in a strange location along the frontal skull base and some left fronto-temporal. Mass effect is mild to moderate  Normal angiogram - i will d/w Dr. Hudson    NM as above - stable  PT/OT to assist with activity    Prophylactic anticoagulation:no due to sdh     Case reviewed with Dr. Hudson, he stated that pt is clear to transfer to floor from our perspective, if he is considered stable by Critical care. Keep on steroids/ keppra and increase activity.

## 2021-03-16 NOTE — CARE PLAN
Problem: Respiratory:  Goal: Respiratory status will improve  Outcome: PROGRESSING AS EXPECTED     Problem: Knowledge Deficit  Goal: Knowledge of the prescribed therapeutic regimen will improve  Outcome: PROGRESSING SLOWER THAN EXPECTED

## 2021-03-16 NOTE — CONSULTS
Hospital Medicine Consultation    Date of Service  3/16/2021    Referring Physician  Dr. Deepak Bravo.    Consulting Physician  Charanjit Boyd M.D.    Reason for Consultation  Assume medical care    History of Presenting Illness  65 y.o. male who presented 3/12/2021 with history of bioprosthetic mitral valve replacement atrial fibrillation on chronic anticoagulation with warfarin transferred to the emergency department after he was noted to be altered and having right upper extremity weakness and aphasia.  He was noted to have large left holohemispheric subdural hematoma.  He was admitted to the intensive care unit his coagulopathy was reversed with Kcentra and was evaluated by neurosurgery.  He has a history of pacemaker and was unable to have an MRI.  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  Today the patient is afebrile he denies any headache he is complaining of generalized malaise.  He is tolerating his diet    Review of Systems  Review of Systems   All other systems reviewed and are negative.      Past Medical History   has a past medical history of Atrial fibrillation (HCC), Back pain, Bronchitis, CAD (coronary artery disease), COPD (chronic obstructive pulmonary disease) ([facility]), Gout, Heart valve disease, Hypertension, Kidney stone, Obesity, Open wound (9/2/2009), Pacemaker, Personal history of venous thrombosis and embolism (2009), PVC (premature ventricular contraction) (4/13/2019), Renal disorder, and Type II or unspecified type diabetes mellitus without mention of complication, not stated as uncontrolled.    Surgical History   has a past surgical history that includes mitral valve replace (3/14/08); maze procedure (3/14/08); angiogram (7/3/2009); angiogram (7/4/2009); cath removal (7/4/2009); thrombectomy (7/4/2009); embolectomy (7/4/2009); irrigation & debridement general  (7/20/2009); wide excision (7/20/2009); other cardiac surgery (2008); other abdominal surgery; mitral valve replace (3/8/2017); lopez (3/8/2017); and aortic valve replacement.    Family History  family history includes Diabetes in his mother; Lung Disease in his father.    Social History   reports that he quit smoking about 40 years ago. His smoking use included cigarettes. He has a 22.50 pack-year smoking history. He has never used smokeless tobacco. He reports current drug use. Drug: Marijuana. He reports that he does not drink alcohol.    Medications  Prior to Admission Medications   Prescriptions Last Dose Informant Patient Reported? Taking?   Pain Pump (PATIENT SUPPLIED) XX SERGEY   Yes Yes   Sig: Inject  as directed continuous.   allopurinol (ZYLOPRIM) 100 MG Tab UNK at [facility] Patient's Home Pharmacy Yes No   Sig: Take 100 mg by mouth every morning.   benzonatate (TESSALON) 100 MG Cap NOT TAKING at NOT TAKING Patient's Home Pharmacy No No   Sig: Take 1 Cap by mouth 3 times a day as needed for Cough.   Patient not taking: Reported on 2/4/2021   cetirizine (ZYRTEC) 10 MG Tab UNK at [facility] Patient's Home Pharmacy Yes No   Sig: Take 10 mg by mouth every morning.   hydrOXYzine HCl (ATARAX) 25 MG Tab UNK at [facility] Patient's Home Pharmacy No No   Sig: Take 1 Tab by mouth 3 times a day as needed for Itching.   lisinopril (PRINIVIL) 2.5 MG Tab UNK at [facility] Patient's Home Pharmacy Yes No   Sig: Take 2.5 mg by mouth every evening.   metFORMIN (GLUCOPHAGE) 500 MG Tab UNK at [facility] Patient's Home Pharmacy Yes No   Sig: Take 500 mg by mouth 2 times a day, with meals.   metoprolol SR (TOPROL XL) 50 MG TABLET SR 24 HR UNK at [facility] Patient's Home Pharmacy Yes No   Sig: Take 50 mg by mouth every morning.   multivitamin (THERAGRAN) Tab UNK at [facility] Patient's Home Pharmacy No No   Sig: Take 1 tablet by mouth every day.   tamsulosin (FLOMAX) 0.4 MG capsule UNK at [facility] Patient's Home Pharmacy No No   Sig: TAKE 1 CAPSULE BY MOUTH EVERY DAY   warfarin  (COUMADIN) 3 MG Tab UNK at [facility] Patient's Home Pharmacy No No   Sig: Take 0.5 Tablets by mouth every day at 6 PM for 30 days.      Facility-Administered Medications: None       Allergies  No Known Allergies    Physical Exam  Temp:  [36 °C (96.8 °F)-36.7 °C (98 °F)] 36.2 °C (97.1 °F)  Pulse:  [80-81] 80  Resp:  [16-34] 16  BP: (107-158)/() 124/82  SpO2:  [93 %-99 %] 95 %    Physical Exam  Vitals and nursing note reviewed.   Constitutional:       Appearance: He is well-developed. He is not diaphoretic.   HENT:      Head: Normocephalic and atraumatic.      Mouth/Throat:      Pharynx: No oropharyngeal exudate.   Eyes:      General: No scleral icterus.        Right eye: No discharge.         Left eye: No discharge.      Conjunctiva/sclera: Conjunctivae normal.      Pupils: Pupils are equal, round, and reactive to light.   Neck:      Vascular: No JVD.      Trachea: No tracheal deviation.   Cardiovascular:      Rate and Rhythm: Normal rate and regular rhythm.      Heart sounds: No murmur. No friction rub. No gallop.    Pulmonary:      Effort: Pulmonary effort is normal. No respiratory distress.      Breath sounds: No stridor. Decreased breath sounds present. No rales.   Chest:      Chest wall: No tenderness.   Abdominal:      General: Bowel sounds are normal. There is no distension.      Palpations: Abdomen is soft.      Tenderness: There is no abdominal tenderness. There is no rebound.   Musculoskeletal:         General: Swelling present. No tenderness.      Cervical back: Neck supple.   Skin:     General: Skin is warm and dry.      Nails: There is no clubbing.   Neurological:      Mental Status: He is alert and oriented to person, place, and time.      Cranial Nerves: No cranial nerve deficit.      Coordination: Coordination normal.   Psychiatric:         Behavior: Behavior normal.         Fluids  Date 03/16/21 0700 - 03/17/21 0659   Shift 0463-2800 7949-6664 6927-2606 24 Hour Total   INTAKE   P.O. 300   300   Shift  Total 300   300   OUTPUT   Urine 300   300   Shift Total 300   300   Weight (kg) 97.1 97.1 97.1 97.1       Laboratory  Recent Labs     03/14/21  0451 03/15/21  0410 03/16/21  0423   WBC 7.0 5.8 5.1   RBC 3.53* 3.26* 3.48*   HEMOGLOBIN 11.6* 10.6* 11.3*   HEMATOCRIT 34.6* 32.1* 34.9*   MCV 98.0* 98.5* 100.3*   MCH 32.9 32.5 32.5   MCHC 33.5* 33.0* 32.4*   RDW 59.8* 59.5* 61.5*   PLATELETCT 136* 134* 159*   MPV 10.5 11.2 11.3     Recent Labs     03/14/21  0451 03/15/21  0410 03/16/21  0423   SODIUM 135 136 136   POTASSIUM 4.4 4.3 5.0   CHLORIDE 106 106 106   CO2 18* 19* 20   GLUCOSE 165* 155* 167*   BUN 30* 32* 30*   CREATININE 0.98 0.90 0.87   CALCIUM 8.9 8.4* 8.9     Recent Labs     03/14/21  0451 03/15/21  0410 03/16/21  0423   APTT 23.9* 21.5* 23.2*   INR 1.15* 1.25* 1.22*                 Imaging  IR-CAROTID-CEREBRAL BILATERAL   Final Result         1.  Normal cerebral arteriogram.      2.  No evidence of arteriovenous malformation or intracranial aneurysm formation.      3.  No evidence of atherosclerotic disease in the right common femoral artery.      CT-CTA HEAD WITH & W/O-POST PROCESS   Final Result      No evidence of occlusive lesion or aneurysm.      Multiple tortuous small vessels along the anterior-inferior aspect of the left temporal lobe could represent some type of vascular malformation.      No significant interval change in left-sided holohemispheric subdural hematoma extending along the falx and layering over the tentorium.      CT-CTA NECK WITH & W/O-POST PROCESSING   Final Result      CT angiogram of the neck within normal limits.      CT-HEAD W/O   Final Result         1. Grossly unchanged left hemispheric subdural hematoma, most in the left frontal and temporal region.   2. No new intracranial hemorrhage.                  CT-HEAD W/O   Final Result      1.  Stable large LEFT hemispheric subdural hematoma with associated mass effect and midline shift.   2.  No significant change from prior exam  obtained 2 hours earlier.      CT-HEAD W/O   Final Result      Large LEFT holohemispheric subdural hematoma with associated mass effect and 4 mm LEFT to RIGHT midline shift.      These findings were discussed with EFREM FITZGERALD on 3/12/2021 11:40 AM.      DX-CHEST-PORTABLE (1 VIEW)   Final Result      1.  Hypoinflation, improved from prior.   2.  Minimal RIGHT lung base atelectasis or scar.   3.  Minimal RIGHT pleural effusion.          Assessment/Plan  * Subdural hematoma, acute (HCC)  Assessment & Plan  Clinically improved  Neurosurgery following and recommending continuing steroids and Keppra  PT/OT/SLP  Physiatry referral    History of prosthetic mitral valve  Assessment & Plan  With paroxysmal atrial fibrillation    Anticoagulation reversed given subdural hematoma  Resume anticoagulation when bleeding risk felt to be acceptable by neurosurgery    COPD (chronic obstructive pulmonary disease) (HCC)- (present on admission)  Assessment & Plan  No acute exacerbation    Gout- (present on admission)  Assessment & Plan  Continue allopurinol    Type 2 diabetes mellitus without complication, without long-term current use of insulin ([facility])- (present on admission)  Assessment & Plan  Continue sliding-scale insulin monitor CBGs with steroids    Recent Labs     03/14/21  1801 03/14/21  2011 03/15/21  1237 03/15/21  1740 03/15/21  2025 03/16/21  0931   POCGLUCOSE 135* 250* 164* 150* 249* 167*        Valvular cardiomyopathy (HCC)- (present on admission)  Assessment & Plan  History of bioprosthetic mitral valve replacement    Pacemaker  Assessment & Plan  History of    Benign prostatic hyperplasia without lower urinary tract symptoms- (present on admission)  Assessment & Plan  Resume Flomax

## 2021-03-16 NOTE — ASSESSMENT & PLAN NOTE
Patient with hyperglycemia worsened by steroids  A1C 5.9  lantus 15 units  SSI  Well controled on current regimen however will likely need to cut back lantus as decadron is tapered off    3/30: We will stop lantus, and continue with ISS. We will monitor and make changes accordingly. Hypoglycemia protocol in place.    3/31: As per neurosurgery we have started the patient on Decadron 4 mg every 6 hours, we will monitor glucose closely if needed we will restart Lantus.    4/5: Patient currently on decadron tapering. We will continue ISS and hypoglycemia protocol for now. Monitor and make changes accordingly.

## 2021-03-16 NOTE — RESPIRATORY CARE
COPD EDUCATION by COPD CLINICAL EDUCATOR  3/16/2021 at 6:36 AM by Ivonne Leigh, RRT     Patient reviewed by COPD education team. Patient does not have a formal diagnosis of COPD, negative PFT, does not use home pulmonary medications and hasn't smoked in 40 years.  Therefore does not qualify for the COPD program.      COPD Assessment  COPD Clinical Specialists ONLY  COPD Education Initiated: No--Quick Screen:  PFT 2018 showed asthma, pt has ESTELA, does not use home pulm meds and hasn't smoked for over 40 years.    Is this a COPD exacerbation patient?: No

## 2021-03-16 NOTE — THERAPY
Speech Language Pathology  Daily Treatment     Patient Name: Morales Olivier  Age:  65 y.o., Sex:  male  Medical Record #: [id number]  Today's Date: 3/16/2021     Precautions  Precautions: Fall Risk, Swallow Precautions ( See Comments)    Assessment    Pt was seen for dysphagia tx at breakfast, with a PU4/TN0 meal tray.  RN reports pt has been doing very well with current diet and may be ready for an upgrade.  Pt sitting up in a chair for meal, complaining of headache and nausea, however agreeable to participate.  He self fed without difficulty, and consumed purees, thins, and 4 oz of soft and bite sized solid trials without any overt s/sx of aspiration.  Mastication was effective with solids, with NO pocketing noted.  Swallow trigger was timely with all textures tested.  Pt declining trials of regular solids today.  Recommend to upgrade diet to SB6/TN0 with assistance for set up and monitoring during meal.      Plan  Upgrade diet to Soft and Bite Sized with Thins (SB6/TN0) with assistance for set up and monitoring during meal    Continue current treatment plan.    Discharge Recommendations: (P) Recommend post-acute placement for additional speech therapy services prior to discharge home      Objective     03/16/21 0950   Verbal Expression   Comments repetitive, makes simple wants and needs known   Auditory Comprehension   Comments follows 1-2 step directives, needs cueing to maintain attention   Cognitive-Linguistic   Level of Consciousness Alert   Voice   Comments strong and clear   Dysphagia    Diet / Liquid Recommendation Thin (0);Soft & Bite-Sized (6) - (Dysphagia III)   Nutritional Liquid Intake Rating Scale Non thickened beverages   Nutritional Food Intake Rating Scale Total oral diet with multiple consistencies but requiring special preparation or compensations   Nursing Communication Swallow Precaution Sign Posted at Head of Bed   Skilled Intervention Compensatory Strategies;Verbal Cueing   Recommended  Route of Medication Administration   Medication Administration  Float Whole with Puree;Crush all Medications in Puree  (as tolerated)

## 2021-03-16 NOTE — PROGRESS NOTES
[facility] ICU Progress Note      Admit Date: 3/12/2021  ICU Day: 4    Resident(s): Clementine Minor M.D.  Attending: HAO GRUBER/ Dr. Bravo    Date & Time:   3/16/2021   1:29 PM       Patient ID:    Name:             Morales Olivier   YOB: 1955  Age:                 65 y.o.  male   MRN:               [id number]    Diagnosis:  Subdural hematoma     ID:  66yo male with PMH afib, SSS s/p  , bioprosthetic mitral valve redo  on warfarin, left atrial thrombus s/p resection , DVT, T2DM who presented after being found down, with right sided weakness and expressive aphasia (LKN 3 days prior). Found to have left frontoparietal SDH with 4mm L to R shift.    Interval Events:  -Underwent 4 vessel cerebral angiogram yesterday without evidence of AVM,. No plan of NSx.  -Seen by PT, OT: post acute placement recommended.  -VSS, labs stable.  -Ok to transfer to floor     Consultants:  Neurosurgery     VITALS:  Pulse: 80  Blood Pressure : 114/82       Temp  Av.3 °C (97.3 °F)  Min: 36 °C (96.8 °F)  Max: 36.7 °C (98 °F)         Fluids:  Intake/Output       21 0700 - 03/15/21 0659 03/15/21 0700 - 21 0659 21 0700 - 21 0659      [id number] 7970-7157 Total 9650-8929 1053-9409 Total 4183-1104 5318-0986 Total       Intake    P.O.  --  200 200  120  -- 120  300  -- 300    P.O. -- 200 200 120 -- 120 300 -- 300    IV Piggyback  --  100 100  --  -- --  --  -- --    Volume (mL) (levETIRAcetam (Keppra) 500 mg in 100 mL NaCl IV premix) -- 100 100 -- -- -- -- -- --    Total Intake -- 300 300 120 -- 120 300 -- 300       Output    Urine  1240  -- 1240  50  350 400  150  -- 150    Number of Times Voided -- 0 x 0 x 1 x 2 x 3 x 1 x -- 1 x    Number of Times Incontinent of Urine -- 2 x 2 x -- -- -- -- -- --    Urine Void (mL) 620 -- 620 50 350 400 150 -- 150    Output (mL) ([REMOVED] External Urinary Catheter (Condom)) 620 -- 620 -- -- -- -- -- --    Stool  --  -- --  --  -- --  --  -- --    Number  of Times Stooled 0 x 0 x 0 x 1 x 0 x 1 x -- -- --    Total Output 1240 -- 1240 50 350 400 150 -- 150       Net I/O     -1240 300 -940 70 -350 -280 150 -- 150        Weight: 97.1 kg (214 lb 1.1 oz)  Recent Labs     03/14/21  0451 03/15/21  0410 03/16/21  0423   SODIUM 135 136 136   POTASSIUM 4.4 4.3 5.0   CHLORIDE 106 106 106   CO2 18* 19* 20   BUN 30* 32* 30*   CREATININE 0.98 0.90 0.87   MAGNESIUM 2.3 1.9 2.0   PHOSPHORUS  --   --  2.9   CALCIUM 8.9 8.4* 8.9     Body mass index is 30.72 kg/m².        Respiratory:     Respiration: (!) 23, Pulse Oximetry: 99 %    Chest Tube Drains:          HemoDynamics:  Pulse: 80 Blood Pressure : 114/82      GI/Nutrition:  Recent Labs     03/14/21  0451 03/15/21  0410 03/16/21  0423   ALTSGPT 40 44 62*   ASTSGOT 19 23 39   ALKPHOSPHAT 67 64 66   TBILIRUBIN 0.5 0.4 0.4   GLUCOSE 165* 155* 167*       Heme:  Recent Labs     03/14/21  0451 03/15/21  0410 03/16/21  0423   RBC 3.53* 3.26* 3.48*   HEMOGLOBIN 11.6* 10.6* 11.3*   HEMATOCRIT 34.6* 32.1* 34.9*   PLATELETCT 136* 134* 159*   PROTHROMBTM 15.0* 16.1* 15.7*   APTT 23.9* 21.5* 23.2*   INR 1.15* 1.25* 1.22*       Infectious Disease:  Temp  Av.3 °C (97.3 °F)  Min: 36 °C (96.8 °F)  Max: 36.7 °C (98 °F)  Recent Labs     03/14/21  0451 03/15/21  0410 03/16/21  0423   WBC 7.0 5.8 5.1   NEUTSPOLYS 87.20* 87.40* 89.60*   LYMPHOCYTES 7.70* 6.80* 4.30*   MONOCYTES 4.10 4.70 4.90   EOSINOPHILS 0.00 0.00 0.00   BASOPHILS 0.00 0.20 0.00   ASTSGOT 19 23 39   ALTSGPT 40 44 62*   ALKPHOSPHAT 67 64 66   TBILIRUBIN 0.5 0.4 0.4       ROS:  Respiratory ROS: negative  Neuro ROS: generalized holocranial headache, stable from yesterday  Cardiac ROS: negative  GI ROS: negative    Physical Exam  Constitutional:       Appearance: He is not ill-appearing.   HENT:      Head: Normocephalic and atraumatic.      Right Ear: External ear normal.      Left Ear: External ear normal.      Nose: Nose normal.      Mouth/Throat:      Pharynx: Oropharynx is clear.    Eyes:      Extraocular Movements: Extraocular movements intact.      Comments: Bilateral 3mm, reactive to light   Cardiovascular:      Pulses: Normal pulses.      Heart sounds: Normal heart sounds.      Comments: paced  Pulmonary:      Effort: No respiratory distress.      Breath sounds: No rales.   Abdominal:      General: Abdomen is flat. Bowel sounds are normal.   Musculoskeletal:      Cervical back: Normal range of motion.   Skin:     General: Skin is warm and dry.   Neurological:      Mental Status: He is alert.      Cranial Nerves: No cranial nerve deficit.      Sensory: No sensory deficit.      Motor: No weakness.      Comments: AOX4, Mild dysathria  Able to move all extremities symmetrically   Psychiatric:         Mood and Affect: Mood normal.           Meds:  Current Facility-Administered Medications   Medication Dose Frequency Provider Last Rate Last Admin   • lisinopril (PRINIVIL) tablet 2.5 mg  2.5 mg Q DAY Deepak Bravo M.D.   2.5 mg at 03/16/21 1009   • metoprolol tartrate (LOPRESSOR) tablet 25 mg  25 mg TWICE DAILY Deepak Bravo M.D.   25 mg at 03/16/21 1009   • levETIRAcetam (KEPPRA) 100 MG/ML solution 500 mg  500 mg Q12HRS Raj Pacheco M.D.   500 mg at 03/16/21 0542   • dexamethasone (DECADRON) tablet 4 mg  4 mg Q6HRS Raj Pacheco M.D.   4 mg at 03/16/21 1240   • insulin regular (HumuLIN R,NovoLIN R) injection  1-6 Units 4X/DAY CICI Pacheco M.D.   1 Units at 03/16/21 0938    And   • glucose 4 g chewable tablet 16 g  16 g Q15 MIN PRN Raj Pacheco M.D.        And   • dextrose 50% (D50W) injection 50 mL  50 mL Q15 MIN PRN Raj Pacheco M.D.       • oxyCODONE immediate-release (ROXICODONE) tablet 5 mg  5 mg Q4HRS PRN Raj Pacheco M.D.   5 mg at 03/16/21 0940    Or   • oxyCODONE immediate release (ROXICODONE) tablet 10 mg  10 mg Q4HRS PRMARTHA Pacheco M.D.       • morphine (pf) 4 mg/mL injection 2-4 mg  2-4 mg Q3HRS PRN Raj Pacheco M.D.   2 mg at 03/14/21 2024   •  labetalol (NORMODYNE/TRANDATE) injection 10 mg  10 mg Q4HRS PRN Santiago Encarnacion M.D.   10 mg at 03/15/21 1542   • senna-docusate (PERICOLACE or SENOKOT S) 8.6-50 MG per tablet 2 tablet  2 tablet BID Santiago Encarnacion M.D.   Stopped at 03/15/21 0600    And   • polyethylene glycol/lytes (MIRALAX) PACKET 1 Packet  1 Packet QDAY PRN Santiago Encarnacion M.D.        And   • magnesium hydroxide (MILK OF MAGNESIA) suspension 30 mL  30 mL QDAY PRN Santiago Encarnacion M.D.        And   • bisacodyl (DULCOLAX) suppository 10 mg  10 mg QDAY PRN Santiago Encarnacion M.D.       • Respiratory Therapy Consult   Continuous RT Gabriel Clarke M.D.       • LORazepam (ATIVAN) injection 2 mg  2 mg Q5 MIN PRN Gabriel Clarke M.D.       • acetaminophen (Tylenol) tablet 650 mg  650 mg Q4HRS PRN Gabriel Clarke M.D.        Or   • acetaminophen (TYLENOL) suppository 650 mg  650 mg Q4HRS PRN Gabriel Clarke M.D.       • MD Alert...ICU Electrolyte Replacement per Pharmacy   PHARMACY TO DOSE Gabriel Clarke M.D.       • hydrALAZINE (APRESOLINE) injection 10 mg  10 mg Q4HRS PRN Gabriel Clarke M.D.   10 mg at 03/15/21 1647   • enalaprilat (VASOTEC) injection 1.25 mg  1.25 mg Q6HRS PRN Raj Pacheco M.D.       • prochlorperazine (COMPAZINE) injection 10 mg  10 mg Q6HRS PRN Gabriel Clarke M.D.       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 INR goal would be 2.5 - 3.5  Received phytonadione and prothrombin complex.  Holding warfarin.Defer to NSGY for recommendation regarding  resumption.      COPD (chronic obstructive pulmonary disease) ([facility])  Assessment & Plan  Stable. Monitor.    Gout  Assessment & Plan  Chronic.    Hold home meds at this time.    Dehydration  Assessment & Plan  Pureed diet per SLP    Type 2 diabetes mellitus without complication, without long-term current use of insulin ([facility])  Assessment & Plan  Hold metformin in hospital.  Insulin sliding scale. Hold while NPO (is not type I diabetic).  Hold home metformin.  Goal 100-180.  Hypoglycemia protocol.    Valvular cardiomyopathy ([facility])  Assessment & Plan  Ok to hold, restart on reassessment    Pacemaker  Assessment & Plan  Currently not mri compatible, will need to assess with reps.    Per chart review patient had pacemaker placed in July 2014 ([employer], model # PM 1110, serial #[id number].  The lead is a [employer], model 2088TC/52.  The lead is serial #[id number].). Per [employer] online manual this device has not been tested for MRI compatibility.    Benign prostatic hyperplasia without lower urinary tract symptoms  Assessment & Plan  Unclear compliance w meds, monitor for now      CODE STATUS: FULL    Quality Measures:  Fragoso Catheter: N/A  DVT Prophylaxis: SCD  Ulcer Prophylaxis:N/A  Antibiotics:N/A  Lines: None    Procedures:  IR cerebral/carotid angiogram 3/15    Imaging:  CT and angiogram reviewed.

## 2021-03-16 NOTE — ASSESSMENT & PLAN NOTE
With paroxysmal atrial fibrillation  Anticoagulation reversed given subdural hematoma  Consulted cardiology: okay with just aspirin (per neurosurgery, can restart in 2 weeks)

## 2021-03-17 NOTE — PROGRESS NOTES
"Pt has been asleep for most of my shift. Aroused him at least every two hours and he complains \"I don't feel good\" repetitively every time he is aroused. When asked why, he claims to have a severe headache but refuses pain medication, he has also complained of nausea but still wants to eat his meals. He refused to work with therapy and actually got agitated when she tried to work with him. He has refused to mobilize today at all. Oxy 5mg was given one time today with little relief. VSS, will continue to monitor.   "

## 2021-03-17 NOTE — THERAPY
"Speech Language Pathology  Daily Treatment     Patient Name: Morales Olivier  Age:  65 y.o., Sex:  male  Medical Record #: [id number]  Today's Date: 3/17/2021     Precautions  Precautions: (P) Fall Risk, Swallow Precautions ( See Comments)    Assessment    Two attempts to see pt. Pt declining tx earlier this AM related to tiredness. Pt seen during his lunch meal. Pt able to move himself up in bed with encouragement. Pt stating he did not feel well and with complaints of HA and stomach not feeling well. Pt also stating \"I want to eat.\" Nurs informed and pt given pain medication. Pt tolerated small pain pill floated in applesauce. Pt with no delay in swallowing and with no coughing with minced chicken, diced green beans, and thin chocolate milk. Mild oral residue that clears with liquids sips. Pt ate approx 40% before stating he wanted to stop. Emesis bag provided and pt encourage to keep HOB elevated related to possible nausea. SLP collaborated with nurs.     Plan  SB6/TNO with swallow strategies. Target aphasia as approp.   Continue current treatment plan.    Discharge Recommendations: (P) Recommend post-acute placement for additional speech therapy services prior to discharge home       03/17/21 1400   Dysphagia    Dysphagia X   Positioning / Behavior Modification Modulate Rate or Bite Size;Multiple Swallows;Other (see Comments)  (sitting at nearly 90 degrees)   Diet / Liquid Recommendation Soft & Bite-Sized (6) - (Dysphagia III);Thin (0)   Nutritional Liquid Intake Rating Scale Non thickened beverages   Nutritional Food Intake Rating Scale Total oral diet with multiple consistencies but requiring special preparation or compensations   Nursing Communication Swallow Precaution Sign Posted at Head of Bed   Skilled Intervention Compensatory Strategies;External Aids   Recommended Route of Medication Administration   Medication Administration  Float Whole with Puree   Patient / Family Goals   Patient / Family Goal #1 " "3/17 \"I want to eat.\"   Goal #1 Outcome Progressing as expected   Short Term Goals   Short Term Goal # 1 B  The patient will consume SB6/TN0 diet with standby assistance, without s/sx of aspiration.   Goal Outcome  # 1 B Progressing as expected   Session Information   Priority 2  (3x,SDH,aphasic, tolerating SB6/TNO)         "

## 2021-03-17 NOTE — THERAPY
"Missed Therapy     Patient Name: Morales Olivier  Age:  65 y.o., Sex:  male  Medical Record #: [id number]  Today's Date: 3/17/2021    Discussed missed therapy with RN. Attempted to see pt for OT treatment. Pt c/o severe headache and stated, \"I don't feel good\". \"Leave me\". With encouragement to attempt simple self care tasks pt became increasingly irritated and aggravated raising his voice. Informed pt OT would inform RN of his headache and see if he was due for pain meds. Pt stated, \"I don't know'\".  Pt stated he did not remember if he got meds. RN updated and informed of OT attempt and pt's response. Will attempt OT next scheduled OT session.          03/17/21 1419   Vitals   O2 Delivery Device None - Room Air   Pain 0 - 10 Group   Location Head   Location Orientation Anterior   Pain Rating Scale (NPRS) 10   Description Constant;Stabbing   Comfort Goal Comfort with Movement   Therapist Pain Assessment Nurse Notified   Cognition    Cognition / Consciousness X   Speech/ Communication Delayed Responses   Level of Consciousness Alert   Comments Pt adamantly refused therapy stating he had a severe headache. Poor insight  re: his deficits.    Anticipated Discharge Equipment and Recommendations   DC Equipment Recommendations Unable to determine at this time   Discharge Recommendations Recommend post-acute placement for additional occupational therapy services prior to discharge home   Interdisciplinary Plan of Care Collaboration   IDT Collaboration with  Nursing   Collaboration Comments Attempted OT treatment. Pt adamantly refused and became increasing in agitation and irritability with encouragement to attempt. RN updated.     Session Information   Date / Session Number  attempted 3/17. Pt adamantly refused. Last seen 3/15 (1/3, 3/21)      "

## 2021-03-17 NOTE — PROGRESS NOTES
Patient transported to floor with transport team and ICU RN. Patient oriented to room, educated on use of call light, vital signs and assessments completed, bed alarm on, belongings and call light within reach.

## 2021-03-17 NOTE — PROGRESS NOTES
2 RN Skin Check    2 RN skin check complete.   Devices in place: None.  Skin assessed under devices: N\A.  Confirmed pressure ulcers found on: None.  New potential pressure ulcers noted on None. Wound consult placed N/A.  The following interventions in place Pillows and Mepilex.    Scattered bruising to all extremities and abdomen.  Bilateral heels dry, pink, and blanching.  Wound to R. Morales - dressing in place, wound care already consulted.  Sacrum red and blanching, mepilex in place.   Groin site clean, dry, and soft. Dressing in place.

## 2021-03-17 NOTE — CARE PLAN
Problem: Discharge Barriers/Planning  Goal: Patient's continuum of care needs will be met  Outcome: PROGRESSING AS EXPECTED     Problem: Skin Integrity  Goal: Risk for impaired skin integrity will decrease  Outcome: PROGRESSING AS EXPECTED     Problem: Urinary Elimination:  Goal: Ability to reestablish a normal urinary elimination pattern will improve  Outcome: PROGRESSING AS EXPECTED

## 2021-03-17 NOTE — CARE PLAN
Problem: Communication  Goal: The ability to communicate needs accurately and effectively will improve  Outcome: PROGRESSING AS EXPECTED     Problem: Infection  Goal: Will remain free from infection  Outcome: PROGRESSING AS EXPECTED     Problem: Bowel/Gastric:  Goal: Normal bowel function is maintained or improved  Outcome: PROGRESSING AS EXPECTED  Goal: Will not experience complications related to bowel motility  Outcome: PROGRESSING AS EXPECTED     Problem: Skin Integrity  Goal: Risk for impaired skin integrity will decrease  Outcome: PROGRESSING AS EXPECTED

## 2021-03-17 NOTE — PROGRESS NOTES
Neurosurgery Progress Note    Subjective:  No acute event over night. Pt states he feels horrible in general, some headache included. No n/v    Exam:  A/OX4, PERRLA, EOM intact  Face symmt, tongue midline  BIANCHI, no pronator drift        BP  Min: 106/70  Max: 136/94  Pulse  Av.2  Min: 66  Max: 92  Resp  Av.5  Min: 16  Max: 34  Temp  Av.4 °C (97.5 °F)  Min: 36.1 °C (97 °F)  Max: 36.9 °C (98.5 °F)  SpO2  Av.8 %  Min: 94 %  Max: 99 %    No data recorded    Recent Labs     03/15/21  0410 03/16/21  0423 03/17/21  0547   WBC 5.8 5.1 7.9   RBC 3.26* 3.48* 3.50*   HEMOGLOBIN 10.6* 11.3* 11.4*   HEMATOCRIT 32.1* 34.9* 34.8*   MCV 98.5* 100.3* 99.4*   MCH 32.5 32.5 32.6   MCHC 33.0* 32.4* 32.8*   RDW 59.5* 61.5* 59.6*   PLATELETCT 134* 159* 145*   MPV 11.2 11.3 11.3     Recent Labs     03/15/21  0410 03/16/21  0423 03/17/21  0547   SODIUM 136 136 132*   POTASSIUM 4.3 5.0 4.8   CHLORIDE 106 106 99   CO2 19* 20 24   GLUCOSE 155* 167* 181*   BUN 32* 30* 31*   CREATININE 0.90 0.87 1.01   CALCIUM 8.4* 8.9 8.8     Recent Labs     03/15/21  0410 03/16/21  0423 21  0547   APTT 21.5* 23.2* 23.2*   INR 1.25* 1.22* 1.19*           Intake/Output       21 07 - 2159 21 07 - 21 0659      2434-8247 5846-5020 Total 4963-0194 1024-3799 Total       Intake    P.O.  500  340 840  --  -- --    P.O. 500 340 840 -- -- --    Total Intake 500 340 840 -- -- --       Output    Urine  650  550 1200  --  -- --    Number of Times Voided 3 x -- 3 x -- -- --    Urine Void (mL)  -- -- --    Total Output  -- -- --       Net I/O     -150 -210 -360 -- -- --            Intake/Output Summary (Last 24 hours) at 3/17/2021 0930  Last data filed at 3/17/2021 0300  Gross per 24 hour   Intake 840 ml   Output 1050 ml   Net -210 ml            • lisinopril  2.5 mg Q DAY   • metoprolol tartrate  25 mg TWICE DAILY   • allopurinol  100 mg QAM   • tamsulosin  0.4 mg DAILY   • levETIRAcetam  500 mg  Q12HRS   • dexamethasone  4 mg Q6HRS   • insulin regular  1-6 Units 4X/DAY ACHS    And   • glucose  16 g Q15 MIN PRN    And   • dextrose 50%  50 mL Q15 MIN PRN   • oxyCODONE immediate-release  5 mg Q4HRS PRN    Or   • oxyCODONE immediate-release  10 mg Q4HRS PRN   • morphine injection  2-4 mg Q3HRS PRN   • labetalol  10 mg Q4HRS PRN   • senna-docusate  2 tablet BID    And   • polyethylene glycol/lytes  1 Packet QDAY PRN    And   • magnesium hydroxide  30 mL QDAY PRN    And   • bisacodyl  10 mg QDAY PRN   • Respiratory Therapy Consult   Continuous RT   • LORazepam  2 mg Q5 MIN PRN   • acetaminophen  650 mg Q4HRS PRN    Or   • acetaminophen  650 mg Q4HRS PRN   • MD Alert...Adult ICU Electrolyte Replacement per Pharmacy   PHARMACY TO DOSE   • hydrALAZINE  10 mg Q4HRS PRN   • enalaprilat  1.25 mg Q6HRS PRN   • prochlorperazine  10 mg Q6HRS PRN       Assessment and Plan:  Hospital day # 6  POD # 0   Subdural blood in a strange location along the frontal skull base and some left fronto-temporal. Mass effect is mild to moderate  Normal angiogram    NM as above - stable  PT/OT to assist with activity    Prophylactic anticoagulation:no due to sdh     Keep on steroids/ keppra and increase activity.

## 2021-03-17 NOTE — PROGRESS NOTES
Hospital Medicine Daily Progress Note    Date of Service  3/17/2021    Chief Complaint  65 y.o. male admitted 3/12/2021 after being found down    Hospital Course  65 y.o. male who presented 3/12/2021 with history of bioprosthetic mitral valve replacement atrial fibrillation on chronic anticoagulation with warfarin transferred to the emergency department after he was noted to be altered and having right upper extremity weakness and aphasia.  He was noted to have large left holohemispheric subdural hematoma.  He was admitted to the intensive care unit his coagulopathy was reversed with Kcentra and was evaluated by neurosurgery.  He has a history of pacemaker and was unable to have an MRI.  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    Patient was transferred out of ICU 3/16. Neurosurgery following patient and recommended steroids (dexamethasone 4mg every 6 hours) and Keppra 500mg Q12 hours for seizure prophylaxis.    Interval Problem Update  Patient reports continued headache and malaise. Denies any other symptoms. He is oriented x 4, however does endorse some confusion. Patient lives by self in apartment and does not have family support.    Consultants/Specialty  Neurosurgery  Critical Care    Code Status  Full Code    Disposition  Likely SNF when cleared by neurosurgery    Review of Systems  Review of Systems   Constitutional: Positive for malaise/fatigue. Negative for chills and fever.   HENT: Negative.  Negative for hearing loss and tinnitus.    Eyes: Negative for blurred vision, double vision and photophobia.   Respiratory: Negative for cough, hemoptysis and sputum production.    Cardiovascular: Negative for chest pain, palpitations and orthopnea.   Gastrointestinal: Negative for abdominal pain, heartburn, nausea and vomiting.   Genitourinary: Negative for dysuria, frequency and urgency.    Musculoskeletal: Negative for back pain, myalgias and neck pain.   Skin: Negative.  Negative for rash.   Neurological: Positive for headaches. Negative for focal weakness and weakness.   Psychiatric/Behavioral: Negative for hallucinations, substance abuse and suicidal ideas.        Physical Exam  Temp:  [36.1 °C (97 °F)-36.9 °C (98.5 °F)] 36.3 °C (97.3 °F)  Pulse:  [66-92] 81  Resp:  [16-30] 16  BP: ()/(62-92) 95/62  SpO2:  [94 %-97 %] 95 %    Physical Exam  Constitutional:       General: He is not in acute distress.  HENT:      Head: Normocephalic.      Nose: Nose normal.      Mouth/Throat:      Mouth: Mucous membranes are moist.      Pharynx: Oropharynx is clear.   Eyes:      Conjunctiva/sclera: Conjunctivae normal.      Pupils: Pupils are equal, round, and reactive to light.   Cardiovascular:      Rate and Rhythm: Normal rate and regular rhythm.   Pulmonary:      Effort: Pulmonary effort is normal.      Breath sounds: Normal breath sounds. No rhonchi or rales.   Abdominal:      General: Abdomen is flat.      Palpations: Abdomen is soft.      Tenderness: There is no abdominal tenderness. There is no guarding.   Musculoskeletal:         General: Swelling present.      Cervical back: Normal range of motion.   Skin:     General: Skin is warm and dry.      Capillary Refill: Capillary refill takes less than 2 seconds.   Neurological:      General: No focal deficit present.      Mental Status: He is alert and oriented to person, place, and time.      Comments: Mild confusion   Psychiatric:         Mood and Affect: Mood normal.         Behavior: Behavior normal.         Fluids    Intake/Output Summary (Last 24 hours) at 3/17/2021 1403  Last data filed at 3/17/2021 0300  Gross per 24 hour   Intake 540 ml   Output 900 ml   Net -360 ml       Laboratory  Recent Labs     03/15/21  0410 03/16/21  0423 03/17/21  0547   WBC 5.8 5.1 7.9   RBC 3.26* 3.48* 3.50*   HEMOGLOBIN 10.6* 11.3* 11.4*   HEMATOCRIT 32.1* 34.9* 34.8*    MCV 98.5* 100.3* 99.4*   MCH 32.5 32.5 32.6   MCHC 33.0* 32.4* 32.8*   RDW 59.5* 61.5* 59.6*   PLATELETCT 134* 159* 145*   MPV 11.2 11.3 11.3     Recent Labs     03/15/21  0410 03/16/21  0423 03/17/21  0547   SODIUM 136 136 132*   POTASSIUM 4.3 5.0 4.8   CHLORIDE 106 106 99   CO2 19* 20 24   GLUCOSE 155* 167* 181*   BUN 32* 30* 31*   CREATININE 0.90 0.87 1.01   CALCIUM 8.4* 8.9 8.8     Recent Labs     03/15/21  0410 03/16/21  0423 03/17/21  0547   APTT 21.5* 23.2* 23.2*   INR 1.25* 1.22* 1.19*               Imaging  IR-CAROTID-CEREBRAL BILATERAL   Final Result         1.  Normal cerebral arteriogram.      2.  No evidence of arteriovenous malformation or intracranial aneurysm formation.      3.  No evidence of atherosclerotic disease in the right common femoral artery.      CT-CTA HEAD WITH & W/O-POST PROCESS   Final Result      No evidence of occlusive lesion or aneurysm.      Multiple tortuous small vessels along the anterior-inferior aspect of the left temporal lobe could represent some type of vascular malformation.      No significant interval change in left-sided holohemispheric subdural hematoma extending along the falx and layering over the tentorium.      CT-CTA NECK WITH & W/O-POST PROCESSING   Final Result      CT angiogram of the neck within normal limits.      CT-HEAD W/O   Final Result         1. Grossly unchanged left hemispheric subdural hematoma, most in the left frontal and temporal region.   2. No new intracranial hemorrhage.                  CT-HEAD W/O   Final Result      1.  Stable large LEFT hemispheric subdural hematoma with associated mass effect and midline shift.   2.  No significant change from prior exam obtained 2 hours earlier.      CT-HEAD W/O   Final Result      Large LEFT holohemispheric subdural hematoma with associated mass effect and 4 mm LEFT to RIGHT midline shift.      These findings were discussed with EFREM FITZGERALD on 3/12/2021 11:40 AM.      DX-CHEST-PORTABLE (1 VIEW)    Final Result      1.  Hypoinflation, improved from prior.   2.  Minimal RIGHT lung base atelectasis or scar.   3.  Minimal RIGHT pleural effusion.           Assessment/Plan  * Subdural hematoma, acute (HCC)  Assessment & Plan  Clinically improved  Neurosurgery following and recommending continuing steroids and Keppra for seizure prophylaxis  PT/OT/SLP following, recommend post acute care on discharge    History of prosthetic mitral valve  Assessment & Plan  With paroxysmal atrial fibrillation  Anticoagulation reversed given subdural hematoma  Resume anticoagulation when bleeding risk felt to be acceptable by neurosurgery    COPD (chronic obstructive pulmonary disease) (Former[facility])- (present on admission)  Assessment & Plan  Patient not in acute exacerbation or requiring oxygen  Continue to monitor    Gout- (present on admission)  Assessment & Plan  Continue allopurinol    Dehydration- (present on admission)  Assessment & Plan  Resolved    Type 2 diabetes mellitus without complication, without long-term current use of insulin ([facility])- (present on admission)  Assessment & Plan  Patient with hyperglycemia worsened by steroid use  Continue sliding-scale insulin, monitor with steroids    Valvular cardiomyopathy ([facility])- (present on admission)  Assessment & Plan  History of bioprosthetic mitral valve replacement    Pacemaker  Assessment & Plan  History of pacemaker limiting ability to obtain MRI    Obesity (BMI 30-39.9)- (present on admission)  Assessment & Plan  Will need weight loss program on discharge    Benign prostatic hyperplasia without lower urinary tract symptoms- (present on admission)  Assessment & Plan  Continue Flomax  No evidence of urinary obstruction       VTE prophylaxis: SCDs,

## 2021-03-17 NOTE — ASSESSMENT & PLAN NOTE
Will need weight loss program on discharge  Patient counseled on lifestyle modifications  Will need close follow up as outpatient.

## 2021-03-18 PROBLEM — D72.823 LEUKEMOID REACTION: Status: ACTIVE | Noted: 2021-01-01

## 2021-03-18 PROBLEM — K64.3 GRADE IV HEMORRHOIDS: Status: ACTIVE | Noted: 2021-01-01

## 2021-03-18 PROBLEM — E87.1 HYPONATREMIA: Status: ACTIVE | Noted: 2021-01-01

## 2021-03-18 PROBLEM — I87.2 VENOUS STASIS DERMATITIS OF RIGHT LOWER EXTREMITY: Status: ACTIVE | Noted: 2021-01-01

## 2021-03-18 NOTE — CARE PLAN
Problem: Fluid Volume:  Goal: Will maintain balanced intake and output  Outcome: PROGRESSING AS EXPECTED     Problem: Skin Integrity  Goal: Risk for impaired skin integrity will decrease  Outcome: PROGRESSING AS EXPECTED     Problem: Psychosocial Needs:  Goal: Level of anxiety will decrease  Outcome: PROGRESSING AS EXPECTED     Problem: Urinary Elimination:  Goal: Ability to reestablish a normal urinary elimination pattern will improve  Outcome: PROGRESSING AS EXPECTED

## 2021-03-18 NOTE — THERAPY
"Physical Therapy   Daily Treatment     Patient Name: Morales Olivier  Age:  65 y.o., Sex:  male  Medical Record #: [id number]  Today's Date: 3/18/2021     Precautions: Swallow Precautions ( See Comments)    Assessment    Rec'd pt in bed, initially refusing to work w/ PT.  Needs quite a bit of verbal encouragement to work w/ therapy.  He is able to move to/from the eob w/o assist.  He is able to stand w/o assist and ambulate 45 ft w/ a fww w/o loss of balance.  He is complaining the entire time of feeling like \"crap\", with what he \"thinks is pain everywhere\".  Self limiting w/ regard to mobility.  If he continues to progress w/ his gait, anticipate he may be able to d/c home.  He owns a fww.    Plan    Continue current treatment plan.    DC Equipment Recommendations: Unable to determine at this time  Discharge Recommendations: Recommend home health for continued physical therapy services        Objective       03/18/21 1055   Balance   Sitting Balance (Static) Fair +   Sitting Balance (Dynamic) Fair +   Standing Balance (Static) Fair   Standing Balance (Dynamic) Fair   Weight Shift Sitting Good   Weight Shift Standing Good   Skilled Intervention Verbal Cuing   Comments w/ fww   Gait Analysis   Gait Level Of Assist Supervised   Assistive Device Front Wheel Walker   Distance (Feet) 45   Bed Mobility    Supine to Sit Supervised   Sit to Supine Supervised   Functional Mobility   Sit to Stand Supervised   Bed, Chair, Wheelchair Transfer Refused   Short Term Goals    Short Term Goal # 1 Patient will move supine<>sitting EOB with supervision within 6tx in order to get in/out of bed   Goal Outcome # 1 Goal met   Short Term Goal # 2 Patient will move sitting<>standing with supervision within 6tx in order to initiate gait and transfers   Goal Outcome # 2 Goal met   Short Term Goal # 3 Patient will ambulate 50ft with supervision within 6tx in order to access environment   Goal Outcome # 3 Goal not met   Anticipated Discharge " Equipment and Recommendations   DC Equipment Recommendations Unable to determine at this time   Discharge Recommendations Recommend home health for continued physical therapy services

## 2021-03-18 NOTE — CARE PLAN
Problem: Communication  Goal: The ability to communicate needs accurately and effectively will improve  Outcome: PROGRESSING AS EXPECTED     Problem: Bowel/Gastric:  Goal: Normal bowel function is maintained or improved  Outcome: PROGRESSING AS EXPECTED  Goal: Will not experience complications related to bowel motility  Outcome: PROGRESSING AS EXPECTED     Problem: Knowledge Deficit  Goal: Knowledge of the prescribed therapeutic regimen will improve  Outcome: PROGRESSING AS EXPECTED     Problem: Discharge Barriers/Planning  Goal: Patient's continuum of care needs will be met  Outcome: PROGRESSING AS EXPECTED

## 2021-03-18 NOTE — PROGRESS NOTES
Neurosurgery Progress Note    Subjective:  No acute event over night. Pt states he has a worsened headache, no n/v    Exam:  A/OX4, PERRLA, EOM intact  Face symmt, tongue midline  BIANCHI, no pronator drift        BP  Min: 95/62  Max: 113/72  Pulse  Av.1  Min: 80  Max: 83  Resp  Av.3  Min: 16  Max: 18  Temp  Av.2 °C (97.1 °F)  Min: 35.9 °C (96.7 °F)  Max: 36.5 °C (97.7 °F)  SpO2  Av.3 %  Min: 95 %  Max: 97 %    No data recorded    Recent Labs     21  0547 21  0537   WBC 5.1 7.9 16.2*   RBC 3.48* 3.50* 4.14*   HEMOGLOBIN 11.3* 11.4* 13.5*   HEMATOCRIT 34.9* 34.8* 40.4*   .3* 99.4* 97.6   MCH 32.5 32.6 32.6   MCHC 32.4* 32.8* 33.4*   RDW 61.5* 59.6* 59.1*   PLATELETCT 159* 145* 221   MPV 11.3 11.3 10.9     Recent Labs     21  0547 21  0537   SODIUM 136 132* 129*   POTASSIUM 5.0 4.8 5.0   CHLORIDE 106 99 98   CO2 20 24 21   GLUCOSE 167* 181* 192*   BUN 30* 31* 35*   CREATININE 0.87 1.01 1.05   CALCIUM 8.9 8.8 9.2     Recent Labs     21  0547   APTT 23.2* 23.2*   INR 1.22* 1.19*           Intake/Output       21 - 2159 21 - 21 0659       Total  Total       Intake    Total Intake -- -- -- -- -- --       Output    Urine  --  450 450  --  -- --    Urine Void (mL) -- 450 450 -- -- --    Stool  --  -- --  --  -- --    Number of Times Stooled -- 5 x 5 x -- -- --    Total Output -- 450 450 -- -- --       Net I/O     -- -450 -450 -- -- --            Intake/Output Summary (Last 24 hours) at 3/18/2021 0852  Last data filed at 3/18/2021 0500  Gross per 24 hour   Intake --   Output 450 ml   Net -450 ml            • dexamethasone  4 mg Q8HRS    Followed by   • [START ON 3/19/2021] dexamethasone  2 mg Q8HRS    Followed by   • [START ON 3/20/2021] dexamethasone  2 mg Q12HRS    Followed by   • [START ON 3/22/2021] dexamethasone  1 mg Q12HRS    Followed by   • [START  ON 3/25/2021] dexamethasone  1 mg DAILY   • levETIRAcetam  500 mg BID   • lisinopril  2.5 mg Q DAY   • metoprolol tartrate  25 mg TWICE DAILY   • allopurinol  100 mg QAM   • tamsulosin  0.4 mg DAILY   • insulin regular  1-6 Units 4X/DAY ACHS    And   • glucose  16 g Q15 MIN PRN    And   • dextrose 50%  50 mL Q15 MIN PRN   • oxyCODONE immediate-release  5 mg Q4HRS PRN    Or   • oxyCODONE immediate-release  10 mg Q4HRS PRN   • morphine injection  2-4 mg Q3HRS PRN   • labetalol  10 mg Q4HRS PRN   • senna-docusate  2 tablet BID    And   • polyethylene glycol/lytes  1 Packet QDAY PRN    And   • magnesium hydroxide  30 mL QDAY PRN    And   • bisacodyl  10 mg QDAY PRN   • Respiratory Therapy Consult   Continuous RT   • LORazepam  2 mg Q5 MIN PRN   • acetaminophen  650 mg Q4HRS PRN    Or   • acetaminophen  650 mg Q4HRS PRN   • hydrALAZINE  10 mg Q4HRS PRN   • enalaprilat  1.25 mg Q6HRS PRN   • prochlorperazine  10 mg Q6HRS PRN       Assessment and Plan:  Hospital day # 7  POD # 0   Subdural blood in a strange location along the frontal skull base and some left fronto-temporal. Mass effect is mild to moderate  Normal angiogram    NM as above - stable  PT/OT to assist with activity    Prophylactic anticoagulation:no due to sdh   CT ordered  Decreased steroids  Continue Keppra

## 2021-03-18 NOTE — PROGRESS NOTES
[facility] Medicine Daily Progress Note    Date of Service  3/18/2021    Chief Complaint  65 y.o. male admitted 3/12/2021 after being found down    Hospital Course  65 y.o. male who presented 3/12/2021 with history of bioprosthetic mitral valve replacement atrial fibrillation on chronic anticoagulation with warfarin transferred to the emergency department after he was noted to be altered and having right upper extremity weakness and aphasia.  He was noted to have large left holohemispheric subdural hematoma.  He was admitted to the intensive care unit his coagulopathy was reversed with Kcentra and was evaluated by neurosurgery.  He has a history of pacemaker and was unable to have an MRI.  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    Patient was transferred out of ICU 3/16. Neurosurgery following patient and recommended steroids (dexamethasone 4mg every 6 hours) and Keppra 500mg Q12 hours for seizure prophylaxis.    Interval Problem Update  Continues to complain of not feeling well, mostly due to headache. Plan for repeat CT head today.  Patient noted to have anal pain with small amount of bright red blood. Has history of hemorrhoids with occasional anal pain and occasional blood. Rectal exam showed prolapsed hemorrhoids.  Leukocytosis new today. Reports some cough and mild burning on urination. Will send UA and obtain CXR  Worsening hyponatremia, will send workup    Consultants/Specialty  Neurosurgery  Critical Care    Code Status  Full Code    Disposition  Likely SNF when cleared by neurosurgery    Review of Systems  Review of Systems   Constitutional: Positive for malaise/fatigue. Negative for chills and fever.   HENT: Negative.  Negative for hearing loss and tinnitus.    Eyes: Negative for blurred vision, double vision and photophobia.   Respiratory: Positive for cough. Negative for hemoptysis and  sputum production.    Cardiovascular: Negative for chest pain, palpitations and orthopnea.   Gastrointestinal: Negative for abdominal pain, heartburn, nausea and vomiting.   Genitourinary: Positive for dysuria. Negative for frequency and urgency.   Musculoskeletal: Negative for back pain, myalgias and neck pain.   Skin: Negative.  Negative for rash.   Neurological: Positive for headaches. Negative for focal weakness and weakness.   Psychiatric/Behavioral: Negative for hallucinations, substance abuse and suicidal ideas.        Physical Exam  Temp:  [35.9 °C (96.7 °F)-36.5 °C (97.7 °F)] 36.1 °C (97 °F)  Pulse:  [80-83] 82  Resp:  [16-18] 16  BP: ()/(62-76) 113/72  SpO2:  [95 %-97 %] 96 %    Physical Exam  Constitutional:       General: He is not in acute distress.  HENT:      Head: Normocephalic.      Nose: Nose normal.      Mouth/Throat:      Mouth: Mucous membranes are moist.      Pharynx: Oropharynx is clear.   Eyes:      Conjunctiva/sclera: Conjunctivae normal.      Pupils: Pupils are equal, round, and reactive to light.   Cardiovascular:      Rate and Rhythm: Normal rate and regular rhythm.   Pulmonary:      Effort: Pulmonary effort is normal.      Breath sounds: Normal breath sounds. No rhonchi or rales.   Abdominal:      General: Abdomen is flat.      Palpations: Abdomen is soft.      Tenderness: There is no abdominal tenderness. There is no guarding.   Genitourinary:     Comments: 3 Prolapsed hemorrhoids. Rectum clear of stool and without obstruction  Musculoskeletal:         General: Swelling present.      Cervical back: Normal range of motion.   Skin:     General: Skin is warm and dry.      Capillary Refill: Capillary refill takes less than 2 seconds.   Neurological:      General: No focal deficit present.      Mental Status: He is alert and oriented to person, place, and time.      Comments: Mild confusion   Psychiatric:         Mood and Affect: Mood normal.         Behavior: Behavior normal.          Fluids    Intake/Output Summary (Last 24 hours) at 3/18/2021 1018  Last data filed at 3/18/2021 0500  Gross per 24 hour   Intake --   Output 450 ml   Net -450 ml       Laboratory  Recent Labs     03/16/21 0423 03/17/21  0547 03/18/21  0537   WBC 5.1 7.9 16.2*   RBC 3.48* 3.50* 4.14*   HEMOGLOBIN 11.3* 11.4* 13.5*   HEMATOCRIT 34.9* 34.8* 40.4*   .3* 99.4* 97.6   MCH 32.5 32.6 32.6   MCHC 32.4* 32.8* 33.4*   RDW 61.5* 59.6* 59.1*   PLATELETCT 159* 145* 221   MPV 11.3 11.3 10.9     Recent Labs     03/16/21 0423 03/17/21  0547 03/18/21  0537   SODIUM 136 132* 129*   POTASSIUM 5.0 4.8 5.0   CHLORIDE 106 99 98   CO2 20 24 21   GLUCOSE 167* 181* 192*   BUN 30* 31* 35*   CREATININE 0.87 1.01 1.05   CALCIUM 8.9 8.8 9.2     Recent Labs     03/16/21 0423 03/17/21  0547   APTT 23.2* 23.2*   INR 1.22* 1.19*               Imaging  IR-CAROTID-CEREBRAL BILATERAL   Final Result         1.  Normal cerebral arteriogram.      2.  No evidence of arteriovenous malformation or intracranial aneurysm formation.      3.  No evidence of atherosclerotic disease in the right common femoral artery.      CT-CTA HEAD WITH & W/O-POST PROCESS   Final Result      No evidence of occlusive lesion or aneurysm.      Multiple tortuous small vessels along the anterior-inferior aspect of the left temporal lobe could represent some type of vascular malformation.      No significant interval change in left-sided holohemispheric subdural hematoma extending along the falx and layering over the tentorium.      CT-CTA NECK WITH & W/O-POST PROCESSING   Final Result      CT angiogram of the neck within normal limits.      CT-HEAD W/O   Final Result         1. Grossly unchanged left hemispheric subdural hematoma, most in the left frontal and temporal region.   2. No new intracranial hemorrhage.                  CT-HEAD W/O   Final Result      1.  Stable large LEFT hemispheric subdural hematoma with associated mass effect and midline shift.   2.   No significant change from prior exam obtained 2 hours earlier.      CT-HEAD W/O   Final Result      Large LEFT holohemispheric subdural hematoma with associated mass effect and 4 mm LEFT to RIGHT midline shift.      These findings were discussed with EFREM FITZGERALD on 3/12/2021 11:40 AM.      DX-CHEST-PORTABLE (1 VIEW)   Final Result      1.  Hypoinflation, improved from prior.   2.  Minimal RIGHT lung base atelectasis or scar.   3.  Minimal RIGHT pleural effusion.      CT-HEAD W/O    (Results Pending)   DX-CHEST-PORTABLE (1 VIEW)    (Results Pending)        Assessment/Plan  * Subdural hematoma, acute (HCC)  Assessment & Plan  Clinically improved  Neurosurgery following and recommending continuing steroids and Keppra for seizure prophylaxis. On steroid taper  Obtain repeat CT scan 3/18  PT/OT/SLP following, recommend post acute care on discharge    History of prosthetic mitral valve  Assessment & Plan  With paroxysmal atrial fibrillation  Anticoagulation reversed given subdural hematoma  Resume anticoagulation when bleeding risk felt to be acceptable by neurosurgery    COPD (chronic obstructive pulmonary disease) ([facility])- (present on admission)  Assessment & Plan  Patient not in acute exacerbation or requiring oxygen  Continue to monitor    Gout- (present on admission)  Assessment & Plan  Continue allopurinol    Dehydration- (present on admission)  Assessment & Plan  Resolved    Type 2 diabetes mellitus without complication, without long-term current use of insulin ([facility])- (present on admission)  Assessment & Plan  Patient with hyperglycemia worsened by steroids  Last A1C 7 in 2020  Repeat A1C today  Due to poorly controlled Hyperglycemia will start lantus 5u QAM and continue Insulin sliding scale      Grade IV hemorrhoids  Assessment & Plan  Patient reports chronic hemorrhoids with occasional mild rectal bleeding  Noted this morning to have prolapsed hemorrhoids, tender with mild bleeding  Will prescribe lidocaine  gel for pain relief      Leukemoid reaction  Assessment & Plan  Patient with acute rise in WBC  Infectious workup with CXR and UA  May be secondary to dexamethasone  Hold Abx for now as not septic and without fever  If fever would consider broad spectrum Abx    Hyponatremia  Assessment & Plan  Worsening Hyponatremia, asymptomatic  Will send serum osmolality, urine sodium, and urine osmolality  May have component of pseudohyponatremia with hyperglycemia      Valvular cardiomyopathy (HCC)- (present on admission)  Assessment & Plan  History of bioprosthetic mitral valve replacement    Pacemaker  Assessment & Plan  History of pacemaker limiting ability to obtain MRI    Obesity (BMI 30-39.9)- (present on admission)  Assessment & Plan  Will need weight loss program on discharge    Benign prostatic hyperplasia without lower urinary tract symptoms- (present on admission)  Assessment & Plan  Continue Flomax  No evidence of urinary obstruction       VTE prophylaxis: SCDs,

## 2021-03-18 NOTE — THERAPY
Occupational Therapy  Daily Treatment     Patient Name: Morales Oliveir  Age:  65 y.o., Sex:  male  Medical Record #: [id number]  Today's Date: 3/18/2021       Precautions: Fall Risk, Swallow Precautions ( See Comments)    Assessment    Pt seen for OT tx. Continues to be limited by decreased activity tolerance, balance deficits, inattention and pain impacting ability to complete ADLs and ADL transfers independently. Pt c/o high levels of pain impacting OOB activity tolerance. Unable to specify location of pain. Will continue to benefit from OT services while in house.     Plan    Continue current treatment plan.    DC Equipment Recommendations: Unable to determine at this time  Discharge Recommendations: Will continue to benefit from OT while in house.        03/18/21 0955   Cognition    Cognition / Consciousness X   Safety Awareness Impaired   Attention Impaired   Balance   Sitting Balance (Static) Fair +   Sitting Balance (Dynamic) Fair   Standing Balance (Static) Fair -   Standing Balance (Dynamic) Fair -   Weight Shift Sitting Good   Weight Shift Standing Fair   Bed Mobility    Supine to Sit Minimal Assist  (HOB elevated )   Sit to Supine   (up in chair )   Scooting Supervised   Activities of Daily Living   Grooming Minimal Assist;Seated   Lower Body Dressing Minimal Assist   Toileting Supervision   Functional Mobility   Sit to Stand Minimal Assist   Bed, Chair, Wheelchair Transfer Minimal Assist   Short Term Goals   Short Term Goal # 1 Pt will demo LB dressing using AE PRN w/ SPV   Goal Outcome # 1 Progressing as expected   Short Term Goal # 2 Pt will demo standing grooming w/ SPV   Goal Outcome # 2 Progressing as expected   Short Term Goal # 3 Pt will demo toilet txf w/ SPV   Goal Outcome # 3 Progressing as expected   Short Term Goal # 4 Pt will demo toilet hygiene w/ SPV   Goal Outcome # 4 Progressing as expected   Anticipated Discharge Equipment and Recommendations   DC Equipment Recommendations Unable to  determine at this time   Discharge Recommendations Recommend post-acute placement for additional occupational therapy services prior to discharge home

## 2021-03-18 NOTE — ASSESSMENT & PLAN NOTE
Patient reports chronic hemorrhoids with occasional mild rectal bleeding  lidocaine gel for pain relief

## 2021-03-18 NOTE — ASSESSMENT & PLAN NOTE
Patient with acute rise in WBC  No evidence of infection on CXR or UA  WBC trending down and now in normal range

## 2021-03-19 PROBLEM — R10.84 GENERALIZED ABDOMINAL PAIN: Status: ACTIVE | Noted: 2021-01-01

## 2021-03-19 PROBLEM — K70.30 ALCOHOLIC CIRRHOSIS OF LIVER WITHOUT ASCITES (HCC): Status: ACTIVE | Noted: 2021-01-01

## 2021-03-19 NOTE — ASSESSMENT & PLAN NOTE
Patient reports history of heavy alcohol use, has not drank in few years  Will need GI follow up as outpatient  Denies previous diagnosis of liver disease  Cirrhotic appearing liver on imaging without ascites  Hep panel neg  Ammonia 26    4/5: Mild elevation of ALT, in the setting of liver disease. Patient currently not complaining of abdominal pain. If it continues to trend upwards or develops new abdominal pain we will consider re-imaging.

## 2021-03-19 NOTE — DISCHARGE PLANNING
Received Choice form at [phone]  Agency/Facility Name: [facility] - resumption  Referral sent per Choice form @ [id number]

## 2021-03-19 NOTE — THERAPY
Missed Therapy     Patient Name: Morales Olivier  Age:  65 y.o., Sex:  male  Medical Record #: [id number]  Today's Date: 3/19/2021    Discussed missed therapy with OSWALD Gonzalez.    SLP attempted to see pt for cognitive-linguistic evaluation. However, pt was napping and about to go down for a CT. SLP will reattempt as able.        03/19/21 1154   Treatment Variance   Reason For Missed Therapy Medical - Patient  in Procedure   Total Time Spent   Total Time Spent (Mins) 5

## 2021-03-19 NOTE — PROGRESS NOTES
Neurosurgery Progress Note    Subjective:  Pt seen by Dr. Hudson, he is feeling & looking a bit better today.     Exam:  A/OX4, PERRLA, EOM intact  Face symmt, tongue midline  BIANCHI, no pronator drift        BP  Min: 90/56  Max: 122/88  Pulse  Av  Min: 79  Max: 81  Resp  Av  Min: 16  Max: 16  Temp  Av.4 °C (97.6 °F)  Min: 35.8 °C (96.5 °F)  Max: 36.9 °C (98.4 °F)  SpO2  Av.3 %  Min: 96 %  Max: 97 %    No data recorded    Recent Labs     21  0547 21  0537 21  0537   WBC 7.9 16.2* 10.3   RBC 3.50* 4.14* 3.96*   HEMOGLOBIN 11.4* 13.5* 12.8*   HEMATOCRIT 34.8* 40.4* 38.4*   MCV 99.4* 97.6 97.0   MCH 32.6 32.6 32.3   MCHC 32.8* 33.4* 33.3*   RDW 59.6* 59.1* 57.1*   PLATELETCT 145* 221 142*   MPV 11.3 10.9 11.4     Recent Labs     21  0547 21  0537 21  0537   SODIUM 132* 129* 129*   POTASSIUM 4.8 5.0 5.0   CHLORIDE 99 98 96   CO2 24 21 24   GLUCOSE 181* 192* 180*   BUN 31* 35* 31*   CREATININE 1.01 1.05 0.94   CALCIUM 8.8 9.2 9.1     Recent Labs     21  0547 21  0537 21  0537   APTT 23.2* 22.6* 22.1*   INR 1.19* 1.17* 1.23*           Intake/Output       21 - 2159 21 - 21 0659      2874-1403 1273-6622 Total 5889-4999 9639-3583 Total       Intake    P.O.  420  620 1040  --  -- --    P.O.  -- -- --    Total Intake  -- -- --       Output    Urine  250  700 950  --  -- --    Urine Void (mL) 250 700 950 -- -- --    Stool  --  -- --  --  -- --    Number of Times Stooled 2 x -- 2 x -- -- --    Total Output 250 700 950 -- -- --       Net I/O     170 -80 90 -- -- --            Intake/Output Summary (Last 24 hours) at 3/19/2021 0926  Last data filed at 3/19/2021 0500  Gross per 24 hour   Intake 800 ml   Output 950 ml   Net -150 ml            • insulin regular  2-9 Units 4X/DAY ACHS    And   • glucose  16 g Q15 MIN PRN    And   • dextrose 50%  50 mL Q15 MIN PRN   • [START ON 3/20/2021] insulin glargine   15 Units QAM INSULIN   • dexamethasone  4 mg Q8HRS   • levETIRAcetam  500 mg BID   • lisinopril  2.5 mg Q DAY   • metoprolol tartrate  25 mg TWICE DAILY   • allopurinol  100 mg QAM   • tamsulosin  0.4 mg DAILY   • oxyCODONE immediate-release  5 mg Q4HRS PRN    Or   • oxyCODONE immediate-release  10 mg Q4HRS PRN   • morphine injection  2-4 mg Q3HRS PRN   • labetalol  10 mg Q4HRS PRN   • senna-docusate  2 tablet BID    And   • polyethylene glycol/lytes  1 Packet QDAY PRN    And   • magnesium hydroxide  30 mL QDAY PRN    And   • bisacodyl  10 mg QDAY PRN   • Respiratory Therapy Consult   Continuous RT   • LORazepam  2 mg Q5 MIN PRN   • acetaminophen  650 mg Q4HRS PRN    Or   • acetaminophen  650 mg Q4HRS PRN   • hydrALAZINE  10 mg Q4HRS PRN   • enalaprilat  1.25 mg Q6HRS PRN   • prochlorperazine  10 mg Q6HRS PRN       Assessment and Plan:  Hospital day # 8  POD # 0   Subdural blood in a strange location along the frontal skull base and some left fronto-temporal. Mass effect is mild to moderate  Normal angiogram    NM as above - stable  PT/OT to assist with activity    Prophylactic anticoagulation:no due to sdh   Decadrton 3mg Q8  CT Monday  Ok to rehab  Continue Keppra

## 2021-03-19 NOTE — CARE PLAN
Problem: Communication  Goal: The ability to communicate needs accurately and effectively will improve  Outcome: PROGRESSING AS EXPECTED  Note: Encouraged use of call light, assessed needs, encouraged pt to voice feelings.        Problem: Bowel/Gastric:  Goal: Normal bowel function is maintained or improved  Outcome: PROGRESSING AS EXPECTED  Note: Medications given per MAR, encouraged ambulation and fluid intake.

## 2021-03-19 NOTE — CARE PLAN
Problem: Skin Integrity  Goal: Risk for impaired skin integrity will decrease  Outcome: PROGRESSING AS EXPECTED     Problem: Respiratory:  Goal: Respiratory status will improve  Outcome: PROGRESSING AS EXPECTED     Problem: Psychosocial Needs:  Goal: Level of anxiety will decrease  Outcome: PROGRESSING AS EXPECTED     Problem: Bowel/Gastric:  Goal: Normal bowel function is maintained or improved  Outcome: PROGRESSING SLOWER THAN EXPECTED  Note: Patient with multiple loose stools, straining, bleeding swollen hemorrhoids.

## 2021-03-19 NOTE — FACE TO FACE
Face to Face Supporting Documentation - Home Health    The encounter with this patient was in whole or in part the primary reason for home health admission.    Date of encounter:   Patient:                    MRN:                       YOB: 2022  Morales Olivier  [id number]  1955     Home health to see patient for:  Skilled Nursing care for assessment, interventions & education, Physical Therapy evaluation and treatment and Occupational therapy evaluation and treatment    Skilled need for:  New Onset Medical Diagnosis Subdual Hematoma, gait disturbance    Skilled nursing interventions to include:  Comment: Assesment and medication education    Homebound status evidenced by:  Needs the assistance of another person in order to leave the home. Leaving home requires a considerable and taxing effort. There is a normal inability to leave the home.    Community Physician to provide follow up care: Yelena Haney P.A.-C.     Optional Interventions? No      I certify the face to face encounter for this home health care referral meets the CMS requirements and the encounter/clinical assessment with the patient was, in whole, or in part, for the medical condition(s) listed above, which is the primary reason for home health care. Based on my clinical findings: the service(s) are medically necessary, support the need for home health care, and the homebound criteria are met.  I certify that this patient has had a face to face encounter by myself.  Ajay Cook M.D. - NPI: [id number]

## 2021-03-19 NOTE — ASSESSMENT & PLAN NOTE
No definitive etiology of pain, does have fat containing ventral hernia and diverticulosis without diverticulitis  No evidence of ascites  Continue to monitor  Pain has since resolved

## 2021-03-19 NOTE — PROGRESS NOTES
Hospital Medicine Daily Progress Note    Date of Service  3/19/2021    Chief Complaint  65 y.o. male admitted 3/12/2021 after being found down    Hospital Course  65 y.o. male who presented 3/12/2021 with history of bioprosthetic mitral valve replacement atrial fibrillation on chronic anticoagulation with warfarin transferred to the emergency department after he was noted to be altered and having right upper extremity weakness and aphasia.  He was noted to have large left holohemispheric subdural hematoma.  He was admitted to the intensive care unit his coagulopathy was reversed with Kcentra and was evaluated by neurosurgery.  He has a history of pacemaker and was unable to have an MRI.  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    Patient was transferred out of ICU 3/16. Neurosurgery following patient and recommended steroids (dexamethasone 4mg every 6 hours) and Keppra 500mg Q12 hours for seizure prophylaxis.    Interval Problem Update  Continues to complain of not feeling well. Mostly complains of headache but also non-specific abdominal pain. Hemorrhoids improving.  CT obtained due to leukocytosis and abdominal pain. Evidence of cirrhotic morphology liver, will obtain ammonia and hepatitis panel. Reports heavy EtOH in past, however has been abstaining for some time.    Consultants/Specialty  Neurosurgery  Critical Care    Code Status  Full Code    Disposition  Home with home health once pain improves    Review of Systems  Review of Systems   Constitutional: Positive for malaise/fatigue. Negative for chills and fever.   HENT: Negative.  Negative for hearing loss and tinnitus.    Eyes: Negative for blurred vision, double vision and photophobia.   Respiratory: Positive for cough. Negative for hemoptysis and sputum production.    Cardiovascular: Negative for chest pain, palpitations and orthopnea.    Gastrointestinal: Negative for abdominal pain, heartburn, nausea and vomiting.   Genitourinary: Positive for dysuria. Negative for frequency and urgency.   Musculoskeletal: Negative for back pain, myalgias and neck pain.   Skin: Negative.  Negative for rash.   Neurological: Positive for headaches. Negative for focal weakness and weakness.   Psychiatric/Behavioral: Negative for hallucinations, substance abuse and suicidal ideas.        Physical Exam  Temp:  [35.8 °C (96.5 °F)-36.9 °C (98.4 °F)] 36.9 °C (98.4 °F)  Pulse:  [79-81] 79  Resp:  [16] 16  BP: ()/(56-88) 122/88  SpO2:  [96 %-97 %] 96 %    Physical Exam  Constitutional:       General: He is not in acute distress.  HENT:      Head: Normocephalic.      Nose: Nose normal.      Mouth/Throat:      Mouth: Mucous membranes are moist.      Pharynx: Oropharynx is clear.   Eyes:      Conjunctiva/sclera: Conjunctivae normal.      Pupils: Pupils are equal, round, and reactive to light.   Cardiovascular:      Rate and Rhythm: Normal rate and regular rhythm.   Pulmonary:      Effort: Pulmonary effort is normal.      Breath sounds: Normal breath sounds. No rhonchi or rales.   Abdominal:      General: Abdomen is flat.      Palpations: Abdomen is soft.      Tenderness: There is no abdominal tenderness. There is no guarding.   Genitourinary:     Comments: 3 Prolapsed hemorrhoids. Rectum clear of stool and without obstruction  Musculoskeletal:         General: Swelling present.      Cervical back: Normal range of motion.   Skin:     General: Skin is warm and dry.      Capillary Refill: Capillary refill takes less than 2 seconds.   Neurological:      General: No focal deficit present.      Mental Status: He is alert and oriented to person, place, and time.      Comments: Mild confusion   Psychiatric:         Mood and Affect: Mood normal.         Behavior: Behavior normal.         Fluids    Intake/Output Summary (Last 24 hours) at 3/19/2021 4075  Last data filed at  3/19/2021 0500  Gross per 24 hour   Intake 620 ml   Output 700 ml   Net -80 ml       Laboratory  Recent Labs     03/17/21  0547 03/18/21  0537 03/19/21  0537   WBC 7.9 16.2* 10.3   RBC 3.50* 4.14* 3.96*   HEMOGLOBIN 11.4* 13.5* 12.8*   HEMATOCRIT 34.8* 40.4* 38.4*   MCV 99.4* 97.6 97.0   MCH 32.6 32.6 32.3   MCHC 32.8* 33.4* 33.3*   RDW 59.6* 59.1* 57.1*   PLATELETCT 145* 221 142*   MPV 11.3 10.9 11.4     Recent Labs     03/17/21  0547 03/18/21  0537 03/19/21  0537   SODIUM 132* 129* 129*   POTASSIUM 4.8 5.0 5.0   CHLORIDE 99 98 96   CO2 24 21 24   GLUCOSE 181* 192* 180*   BUN 31* 35* 31*   CREATININE 1.01 1.05 0.94   CALCIUM 8.8 9.2 9.1     Recent Labs     03/17/21  0547 03/18/21  0537 03/19/21  0537   APTT 23.2* 22.6* 22.1*   INR 1.19* 1.17* 1.23*               Imaging  CT-ABDOMEN-PELVIS WITH   Final Result      1.  Ascites is no longer identified.      2.  Liver surface is mildly nodular consistent with cirrhosis.      3.  Splenomegaly is again noted. Punctate focus of decreased attenuation in the spleen could be due to normal differential enhancement although small hemangioma is also possible.      4.  Ventral abdominal wall hernia containing abdominal fat is again identified.      5.  Consolidation and volume loss is noted posteriorly in the right lower lobe which could indicate pneumonia.      6.  Diverticulosis.         DX-CHEST-PORTABLE (1 VIEW)   Final Result      Small right pleural effusion.      Right basilar opacities may represent atelectasis or scarring.         CT-HEAD W/O   Final Result      1.  Stable size of the extra-axial hemorrhage overlying the left cerebral hemisphere with sulci effacement and approximately 3 mm of left-to-right midline shift.         IR-CAROTID-CEREBRAL BILATERAL   Final Result         1.  Normal cerebral arteriogram.      2.  No evidence of arteriovenous malformation or intracranial aneurysm formation.      3.  No evidence of atherosclerotic disease in the right common  femoral artery.      CT-CTA HEAD WITH & W/O-POST PROCESS   Final Result      No evidence of occlusive lesion or aneurysm.      Multiple tortuous small vessels along the anterior-inferior aspect of the left temporal lobe could represent some type of vascular malformation.      No significant interval change in left-sided holohemispheric subdural hematoma extending along the falx and layering over the tentorium.      CT-CTA NECK WITH & W/O-POST PROCESSING   Final Result      CT angiogram of the neck within normal limits.      CT-HEAD W/O   Final Result         1. Grossly unchanged left hemispheric subdural hematoma, most in the left frontal and temporal region.   2. No new intracranial hemorrhage.                  CT-HEAD W/O   Final Result      1.  Stable large LEFT hemispheric subdural hematoma with associated mass effect and midline shift.   2.  No significant change from prior exam obtained 2 hours earlier.      CT-HEAD W/O   Final Result      Large LEFT holohemispheric subdural hematoma with associated mass effect and 4 mm LEFT to RIGHT midline shift.      These findings were discussed with EFREM FITZGERALD on 3/12/2021 11:40 AM.      DX-CHEST-PORTABLE (1 VIEW)   Final Result      1.  Hypoinflation, improved from prior.   2.  Minimal RIGHT lung base atelectasis or scar.   3.  Minimal RIGHT pleural effusion.           Assessment/Plan  * Subdural hematoma, acute (HCC)  Assessment & Plan  Clinically improved, but continues to complain of headache  Neurosurgery following and recommending continuing steroids and Keppra for seizure prophylaxis. On steroid taper  Obtain repeat CT scan 3/22  PT/OT/SLP following, recommending home health on discharge    History of prosthetic mitral valve  Assessment & Plan  With paroxysmal atrial fibrillation  Anticoagulation reversed given subdural hematoma  Resume anticoagulation when bleeding risk felt to be acceptable by neurosurgery    COPD (chronic obstructive pulmonary disease)  (HCC)- (present on admission)  Assessment & Plan  Patient not in acute exacerbation or requiring oxygen  Continue to monitor    Gout- (present on admission)  Assessment & Plan  Continue allopurinol    Dehydration- (present on admission)  Assessment & Plan  Resolved    Type 2 diabetes mellitus without complication, without long-term current use of insulin (HCC)- (present on admission)  Assessment & Plan  Patient with hyperglycemia worsened by steroids  A1C 5.9  Due to poorly controlled Hyperglycemia increase lantus 15u QAM and continue Insulin sliding scale      Alcoholic cirrhosis of liver without ascites (HCC)  Assessment & Plan  Patient reports history of heavy alcohol use, has not drank in few years  Will need GI follow up as outpatient  Denies previous diagnosis of liver disease  Cirrhotic appearing liver on imaging without ascites  INR elevation representing liver dysfunction  Will send hepatitis panel and ammonia level  Mild elevation of ALT    Generalized abdominal pain  Assessment & Plan  No definitive etiology of pain, does have fat containing ventral hernia and diverticulosis without diverticulitis  No evidence of ascites  Continue to monitor    Venous stasis dermatitis of right lower extremity  Assessment & Plan  Does not appear infected on examination  Continue skin care per nursing protocol    Grade IV hemorrhoids  Assessment & Plan  Patient reports chronic hemorrhoids with occasional mild rectal bleeding  Noted this morning to have prolapsed hemorrhoids, tender with mild bleeding  Will prescribe lidocaine gel for pain relief  Hemorrhoids have improved today      Leukemoid reaction  Assessment & Plan  Patient with acute rise in WBC  No evidence of infection on CXR or UA  WBC trending down  Likely secondary to dexamethasone  Hold Abx for now as not septic and without fever  If fever would consider broad spectrum Abx    Hyponatremia  Assessment & Plan  Asymptomatic hyponatremia  With serum osmolality 290,  likely pseudohyponatremia from hyperglycemia with component of cirrhosis  Continue to monitor,  Better glucose control      Valvular cardiomyopathy (HCC)- (present on admission)  Assessment & Plan  History of bioprosthetic mitral valve replacement    Pacemaker  Assessment & Plan  History of pacemaker limiting ability to obtain MRI    Obesity (BMI 30-39.9)- (present on admission)  Assessment & Plan  Will need weight loss program on discharge    Benign prostatic hyperplasia without lower urinary tract symptoms- (present on admission)  Assessment & Plan  Continue Flomax  No evidence of urinary obstruction       VTE prophylaxis: SCDs due to SDH

## 2021-03-20 PROBLEM — R41.89 COGNITIVE DEFICITS: Status: ACTIVE | Noted: 2021-01-01

## 2021-03-20 NOTE — PROGRESS NOTES
"Hospital Medicine Daily Progress Note    Date of Service  3/20/2021    Chief Complaint  65 y.o. male admitted 3/12/2021 after being found down    Hospital Course  65 y.o. male who presented 3/12/2021 with history of bioprosthetic mitral valve replacement atrial fibrillation on chronic anticoagulation with warfarin transferred to the emergency department after he was noted to be altered and having right upper extremity weakness and aphasia.  He was noted to have large left holohemispheric subdural hematoma.  He was admitted to the intensive care unit his coagulopathy was reversed with Kcentra and was evaluated by neurosurgery.  He has a history of pacemaker and was unable to have an MRI.  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    Patient was transferred out of ICU 3/16. Neurosurgery following patient and recommended steroids (dexamethasone 4mg every 6 hours) and Keppra 500mg Q12 hours for seizure prophylaxis.    Interval Problem Update  Patient continues to \"not feel well, mostly my head doesn't feel right\". Evaluated by SLP and found to have cognitive deficit, likely requiring SNF    Consultants/Specialty  Neurosurgery  Critical Care    Code Status  Full Code    Disposition  Likely SNF due to cognitive deficits    Review of Systems  Review of Systems   Constitutional: Positive for malaise/fatigue. Negative for chills and fever.   HENT: Negative.  Negative for hearing loss and tinnitus.    Eyes: Negative for blurred vision, double vision and photophobia.   Respiratory: Positive for cough. Negative for hemoptysis and sputum production.    Cardiovascular: Negative for chest pain, palpitations and orthopnea.   Gastrointestinal: Negative for abdominal pain, heartburn, nausea and vomiting.   Genitourinary: Positive for dysuria. Negative for frequency and urgency.   Musculoskeletal: Negative for back pain, " myalgias and neck pain.   Skin: Negative.  Negative for rash.   Neurological: Positive for headaches. Negative for focal weakness and weakness.   Psychiatric/Behavioral: Negative for hallucinations, substance abuse and suicidal ideas.        Physical Exam  Temp:  [35.9 °C (96.6 °F)-36.1 °C (97 °F)] 36.1 °C (97 °F)  Pulse:  [78-83] 81  Resp:  [16] 16  BP: ()/(57-80) 117/80  SpO2:  [96 %-99 %] 98 %    Physical Exam  Constitutional:       General: He is not in acute distress.  HENT:      Head: Normocephalic.      Nose: Nose normal.      Mouth/Throat:      Mouth: Mucous membranes are moist.      Pharynx: Oropharynx is clear.   Eyes:      Conjunctiva/sclera: Conjunctivae normal.      Pupils: Pupils are equal, round, and reactive to light.   Cardiovascular:      Rate and Rhythm: Normal rate and regular rhythm.   Pulmonary:      Effort: Pulmonary effort is normal.      Breath sounds: Normal breath sounds. No rhonchi or rales.   Abdominal:      General: Abdomen is flat.      Palpations: Abdomen is soft.      Tenderness: There is no abdominal tenderness. There is no guarding.   Genitourinary:     Comments: 3 Prolapsed hemorrhoids. Rectum clear of stool and without obstruction  Musculoskeletal:         General: Swelling present.      Cervical back: Normal range of motion.   Skin:     General: Skin is warm and dry.      Capillary Refill: Capillary refill takes less than 2 seconds.   Neurological:      General: No focal deficit present.      Mental Status: He is alert and oriented to person, place, and time.      Comments: Mild confusion   Psychiatric:         Mood and Affect: Mood normal.         Behavior: Behavior normal.         Fluids    Intake/Output Summary (Last 24 hours) at 3/20/2021 1203  Last data filed at 3/20/2021 0900  Gross per 24 hour   Intake 200 ml   Output 445 ml   Net -245 ml       Laboratory  Recent Labs     03/18/21  0537 03/19/21  0537   WBC 16.2* 10.3   RBC 4.14* 3.96*   HEMOGLOBIN 13.5* 12.8*    HEMATOCRIT 40.4* 38.4*   MCV 97.6 97.0   MCH 32.6 32.3   MCHC 33.4* 33.3*   RDW 59.1* 57.1*   PLATELETCT 221 142*   MPV 10.9 11.4     Recent Labs     03/18/21  0537 03/19/21  0537 03/20/21  0547   SODIUM 129* 129* 129*   POTASSIUM 5.0 5.0 5.0   CHLORIDE 98 96 96   CO2 21 24 25   GLUCOSE 192* 180* 159*   BUN 35* 31* 33*   CREATININE 1.05 0.94 0.96   CALCIUM 9.2 9.1 8.2*     Recent Labs     03/18/21  0537 03/19/21  0537 03/20/21  0547   APTT 22.6* 22.1* 22.6*   INR 1.17* 1.23* 1.17*               Imaging  CT-ABDOMEN-PELVIS WITH   Final Result      1.  Ascites is no longer identified.      2.  Liver surface is mildly nodular consistent with cirrhosis.      3.  Splenomegaly is again noted. Punctate focus of decreased attenuation in the spleen could be due to normal differential enhancement although small hemangioma is also possible.      4.  Ventral abdominal wall hernia containing abdominal fat is again identified.      5.  Consolidation and volume loss is noted posteriorly in the right lower lobe which could indicate pneumonia.      6.  Diverticulosis.         DX-CHEST-PORTABLE (1 VIEW)   Final Result      Small right pleural effusion.      Right basilar opacities may represent atelectasis or scarring.         CT-HEAD W/O   Final Result      1.  Stable size of the extra-axial hemorrhage overlying the left cerebral hemisphere with sulci effacement and approximately 3 mm of left-to-right midline shift.         IR-CAROTID-CEREBRAL BILATERAL   Final Result         1.  Normal cerebral arteriogram.      2.  No evidence of arteriovenous malformation or intracranial aneurysm formation.      3.  No evidence of atherosclerotic disease in the right common femoral artery.      CT-CTA HEAD WITH & W/O-POST PROCESS   Final Result      No evidence of occlusive lesion or aneurysm.      Multiple tortuous small vessels along the anterior-inferior aspect of the left temporal lobe could represent some type of vascular malformation.       No significant interval change in left-sided holohemispheric subdural hematoma extending along the falx and layering over the tentorium.      CT-CTA NECK WITH & W/O-POST PROCESSING   Final Result      CT angiogram of the neck within normal limits.      CT-HEAD W/O   Final Result         1. Grossly unchanged left hemispheric subdural hematoma, most in the left frontal and temporal region.   2. No new intracranial hemorrhage.                  CT-HEAD W/O   Final Result      1.  Stable large LEFT hemispheric subdural hematoma with associated mass effect and midline shift.   2.  No significant change from prior exam obtained 2 hours earlier.      CT-HEAD W/O   Final Result      Large LEFT holohemispheric subdural hematoma with associated mass effect and 4 mm LEFT to RIGHT midline shift.      These findings were discussed with EFREM FITZGERALD on 3/12/2021 11:40 AM.      DX-CHEST-PORTABLE (1 VIEW)   Final Result      1.  Hypoinflation, improved from prior.   2.  Minimal RIGHT lung base atelectasis or scar.   3.  Minimal RIGHT pleural effusion.           Assessment/Plan  * Subdural hematoma, acute (HCC)  Assessment & Plan  Clinically improved, but continues to complain of headache  Neurosurgery following and recommending continuing steroids and Keppra for seizure prophylaxis. On steroid taper  Obtain repeat CT scan 3/22  PT/OT/SLP following, recommending home health on discharge    History of prosthetic mitral valve  Assessment & Plan  With paroxysmal atrial fibrillation  Anticoagulation reversed given subdural hematoma  Resume anticoagulation when bleeding risk felt to be acceptable by neurosurgery    COPD (chronic obstructive pulmonary disease) (HCC)- (present on admission)  Assessment & Plan  Patient not in acute exacerbation or requiring oxygen  Continue to monitor    Gout- (present on admission)  Assessment & Plan  Continue allopurinol    Dehydration- (present on admission)  Assessment & Plan  Resolved    Type 2  diabetes mellitus without complication, without long-term current use of insulin (HCC)- (present on admission)  Assessment & Plan  Patient with hyperglycemia worsened by steroids  A1C 5.9  Due to poorly controlled Hyperglycemia increase lantus 15u QAM and continue Insulin sliding scale      Cognitive deficits  Assessment & Plan  Evaluated by SLP and found to have cognitive deficits, recommending SNF  Unclear if baseline or sequelae of SDH  Will likely need SNF placement    Alcoholic cirrhosis of liver without ascites (HCC)  Assessment & Plan  Patient reports history of heavy alcohol use, has not drank in few years  Will need GI follow up as outpatient  Denies previous diagnosis of liver disease  Cirrhotic appearing liver on imaging without ascites  INR elevation representing liver dysfunction  Will send hepatitis panel and ammonia level  Mild elevation of ALT    Generalized abdominal pain  Assessment & Plan  No definitive etiology of pain, does have fat containing ventral hernia and diverticulosis without diverticulitis  No evidence of ascites  Continue to monitor    Venous stasis dermatitis of right lower extremity  Assessment & Plan  Does not appear infected on examination  Continue skin care per nursing protocol    Grade IV hemorrhoids  Assessment & Plan  Patient reports chronic hemorrhoids with occasional mild rectal bleeding  Noted this morning to have prolapsed hemorrhoids, tender with mild bleeding  Will prescribe lidocaine gel for pain relief  Hemorrhoids have improved today      Leukemoid reaction  Assessment & Plan  Patient with acute rise in WBC  No evidence of infection on CXR or UA  WBC trending down  Likely secondary to dexamethasone  Hold Abx for now as not septic and without fever  If fever would consider broad spectrum Abx    Hyponatremia  Assessment & Plan  Asymptomatic hyponatremia  With serum osmolality 290, likely pseudohyponatremia from hyperglycemia with component of cirrhosis  Continue to  monitor,  Better glucose control      Valvular cardiomyopathy (HCC)- (present on admission)  Assessment & Plan  History of bioprosthetic mitral valve replacement    Pacemaker  Assessment & Plan  History of pacemaker limiting ability to obtain MRI    Obesity (BMI 30-39.9)- (present on admission)  Assessment & Plan  Will need weight loss program on discharge    Benign prostatic hyperplasia without lower urinary tract symptoms- (present on admission)  Assessment & Plan  Continue Flomax  No evidence of urinary obstruction       VTE prophylaxis: SCDs due to SDH

## 2021-03-20 NOTE — ASSESSMENT & PLAN NOTE
Evaluated by SLP and found to have cognitive deficits, recommending SNF  Cognitive deficits likely secondary to SDH  Discussed with friend, he is not at previous baseline    3/23 Will likely need SNF placement, order placed  CM to assist  Accepted by HH  Noncompliance with pt/ot  Encourage activity    4/5: Pending placement.    4/8: refusing SNF, will have PT/OT/ST re-evaluate

## 2021-03-20 NOTE — CARE PLAN
Problem: Communication  Goal: The ability to communicate needs accurately and effectively will improve  Outcome: PROGRESSING SLOWER THAN EXPECTED  Note: Pt A&OX4, Q4 neuro Checks in place. Follows commands and responds appropriately, will continue to Q2 hourly round, encourage the Pt to ask questions and voice feelings.       Problem: Pain Management  Goal: Pain level will decrease to patient's comfort goal  Outcome: PROGRESSING SLOWER THAN EXPECTED  Note: Pt educated to call for assistance, pharmacological and non pharmacological interventions provided, assessed pain scale Q4 hrs.

## 2021-03-20 NOTE — CARE PLAN
Problem: Communication  Goal: The ability to communicate needs accurately and effectively will improve  Outcome: PROGRESSING AS EXPECTED  Note: Encouraged use of call light, assessed needs, encouraged pt to voice feelings.        Problem: Safety  Goal: Will remain free from falls  Outcome: PROGRESSING AS EXPECTED  Note: Patient at risk for falls due to recent falls, bed alarm on, walker out of sight, nonskid socks on, call light within reach, personal belongings within reach, toileting offered.

## 2021-03-20 NOTE — THERAPY
"Speech Language Pathology   Cognitive Evaluation     Patient Name: Morales Olivier  AGE:  65 y.o., SEX:  male  Medical Record #: [id number]  Today's Date: 3/20/2021     Precautions  Precautions: Swallow Precautions ( See Comments)    Assessment  Pt is a 66 y/o male who presented on 3/12/21 with AMS and rigt UE weakness with aphasia symptoms. Per chart, he was noted to have large left holohemispheric subdural hematoma. Due to underlying cardiac condition, he has a pacemaker, thus not able to participate in Brain MRI. Head CT revealed, \"Stable size of the extra-axial hemorrhage overlying the left cerebral hemisphere with sulci effacement and approximately 3mm of left-to-right mildine shfit.\"     Patient seen this date for a cognitive evaluation. He was resting in bed with no visitors present. Patient initially declined to participate, stating that he was tired and did not feel well. He perseverated on his head hurting and often kept his eyes shut and asked to discontinue this evaluation. As a result, the following scores may be effected by poor patient participation. He completed subtests of the Cognistat and informal assessment. The complete Cognistat was unable to be fully administered due to patient declined in participation or requesting to skip despite max cues from SLP. He demonstrated moderate cognitive impairment in orientation, attention, comprehension, naming, repetition and similarities. He demonstrated severe cognitive impairment in problem solving, safety awareness and reasoning skills. He was able to successfully complete oral reading task at the sentence level with minimal comprehension carry-over. In written expression task, he demonstrated inconsistent writing legibility with incorrect information related to personal information. Clock drawing task showed numeric omission, poor planning, incorrect numeric placement, missing clock hands and overall poor visuospatial awareness. Ongoing cues required for " "participation and task completion. As a result, cannot rule out patient behavior as a factor in poor scoring this date.  When asked what the patient would do if he were at home, he stated, \"I'd just be in bed sleeping. Doing nothing.\" He demonstrates low motivation and poor participation in cognitive tasks, showing low safety awareness skills and limited attention skills for comprehension. As a result, he does not demonstrate high potential to improve in independent function skilled intervention.   Due to the severity of his cognitive impairment demonstrated this date, recommend dischare to SNF for meal preparation, medication management, assistance in personal hygiene and overall safety.       Plan  1) Initiate skilled cognitive intervention on a trial bases, pending patient's participation, targeting attention, memory, safety awareness and problem solving.   2) Continue dysphagia intervention.     Recommend Speech Therapy 3 times per week until therapy goals are met for the following treatments:  Comprehension Training and Cognitive-Linguistic Training.    Discharge Recommendations: Recommend post-acute placement for additional speech therapy services prior to discharge home    Subjective  Patient seen this date for cognitive evaluation.      Objective     03/20/21 1053   Charge Group   SLP Speech Language Evaluation Speech Sound Language Comprehension   Verbal Expression   Verbal Expression / Aphasia Eval (WDL) X   Verbal Output: Phrases Within Functional Limits (6-7)   Verbal Output Functional Minimal (4)   Repetition: Single Words Minimal (4)   Repetition: Phrases Moderate (3)   Naming Moderate (3)   Skilled Intervention Compensatory Strategies;Verbal Cueing;External Aids   Comments Frequently stating, \"Oh I don't know\" and perseverating on feeling poorly    Auditory Comprehension   Auditory Comprehension (WDL) X   Yes / No Questions: Personal Information Minimal (4)   Follows One Unit Commands Minimal (4) "   Follows Two Unit Commands Moderate (3)   Follows Three Unit Commands Severe (2)   Understands Simple, Structured Conversation  Minimal (4)   Understands Complex Conversation Moderate (3)   Comments Onoging cues needed for sustained attention, difficulty with 2-3 step directions    Reading Comprehension   Reading Comprehension (WDL) X   Reading Words Within Functional Limits (6-7)   Reading Phrases Within Functional Limits (6-7)   Reading Sentences Minimal (4)   Written Expression   Written Expression (WDL) X   Functional  Writing: Simple Form Minimal (4)   Functional Writing: Single Letter Minimal (4)   Functional Writing: Name Moderate (3)   Functional Writing Dictation: Word Moderate (3)   Comments Inconsitent legibility    Cognitive-Linguistic   Cognitive-Linguistic (WDL) X   Level of Consciousness Lethargic   Orientation Level Not Oriented to Address;Not Oriented to Year;Not Oriented to Day;Not Oriented to Time;Not Oriented to Reason   Sustained Attention Moderate (3)   Alternating Attention Moderate (3)   Divided Attention Moderate (3)   Selective Attention Moderate (3)   Short Term Memory Moderate (3)   Immediate Memory Minimal (4)   Long Term Memory / Reminiscing Minimal (4)   Simple Reasoning / Problem Solving Moderate (3)   Complex Reasoning  / Problem Solving Severe (2)   [place] Reasoning Moderate (3)   Abstract Reasoning Severe (2)   Safety Awareness Severe (2)   Insight into Deficits Moderate (3)   Executive Functioning / Organization Moderate (3)   Verbal Sequencing (Simple) Within Functional Limits (6-7)   Verbal Sequencing (Complex) Moderate (3)   Written Sequencing Moderate (3)   Auditory Math Minimal (4)   Clock Drawing Poor Planning;Disorganization;Numeric Errors;Omissions;Impaired Hand Placement   Patient / Family Goals   Patient / Family Goal #1 I don't feel good, my head hurts.   Short Term Goals   Short Term Goal # 2 Pt will maintain attention to task for 2 minutes with min-mod cues   Short  Term Goal # 3 Patient will participate in short term memory tasks with 90% accuracy and mod cues   Short Term Goal # 4 Patient will complete simple problem solving tasks related to safety and ADLs with 90% accuracy and mod cues   Education Group   Education Provided Traumatic Brain Injury / Cognitive-Linguistic;Role of Speech Therapy   TBI / Cog-Ling Patient Response Patient;Acceptance;Demonstration;Reinforcement Needed;No Learning Evidence   Role of SLP Patient Response Patient;Acceptance;Demonstration;Reinforcement Needed;No Learning Evidence   Problem List   Problem List Dysphagia;Cognitive-Linguistic Deficits;Attention Deficit;Reading Comprehension Deficit;Visual Perception Deficit;Memory Deficit;Verbal Problem Solving Deficits;Executive Function Deficit;Impaired Safety;Impaired Judgement   Anticipated Discharge Needs   Discharge Recommendations Recommend post-acute placement for additional speech therapy services prior to discharge home   Therapy Recommendations Upon DC Dysphagia Training;Comprehension Training;Cognitive-Linguistic Training;Community Re-Integration;Patient / Family / Caregiver Education

## 2021-03-21 PROBLEM — D69.6 THROMBOCYTOPENIA (HCC): Status: ACTIVE | Noted: 2021-01-01

## 2021-03-21 NOTE — PROGRESS NOTES
Hospital Medicine Daily Progress Note    Date of Service  3/21/2021    Chief Complaint  65 y.o. male admitted 3/12/2021 after being found down    Hospital Course  65 y.o. male who presented 3/12/2021 with history of bioprosthetic mitral valve replacement atrial fibrillation on chronic anticoagulation with warfarin transferred to the emergency department after he was noted to be altered and having right upper extremity weakness and aphasia.  He was noted to have large left holohemispheric subdural hematoma.  He was admitted to the intensive care unit his coagulopathy was reversed with Kcentra and was evaluated by neurosurgery.  He has a history of pacemaker and was unable to have an MRI.  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    Patient was transferred out of ICU 3/16. Neurosurgery following patient and recommended steroids (dexamethasone 4mg every 6 hours) and Keppra 500mg Q12 hours for seizure prophylaxis.    Interval Problem Update    No acute events overnight still complaining of headache and non-specific malaise. He is agreeable to SNF for further recovery.    Consultants/Specialty  Neurosurgery  Critical Care    Code Status  Full Code    Disposition  Likely SNF due to cognitive deficits    Review of Systems  Review of Systems   Constitutional: Positive for malaise/fatigue. Negative for chills and fever.   HENT: Negative.  Negative for hearing loss and tinnitus.    Eyes: Negative for blurred vision, double vision and photophobia.   Respiratory: Positive for cough. Negative for hemoptysis and sputum production.    Cardiovascular: Negative for chest pain, palpitations and orthopnea.   Gastrointestinal: Negative for abdominal pain, heartburn, nausea and vomiting.   Genitourinary: Positive for dysuria. Negative for frequency and urgency.   Musculoskeletal: Negative for back pain, myalgias and neck  pain.   Skin: Negative.  Negative for rash.   Neurological: Positive for headaches. Negative for focal weakness and weakness.   Psychiatric/Behavioral: Negative for hallucinations, substance abuse and suicidal ideas.        Physical Exam  Temp:  [36.1 °C (97 °F)-36.3 °C (97.4 °F)] 36.2 °C (97.2 °F)  Pulse:  [64-78] 76  Resp:  [14-16] 16  BP: ()/(76-85) 109/76  SpO2:  [95 %-97 %] 95 %    Physical Exam  Constitutional:       General: He is not in acute distress.  HENT:      Head: Normocephalic.      Nose: Nose normal.      Mouth/Throat:      Mouth: Mucous membranes are moist.      Pharynx: Oropharynx is clear.   Eyes:      Conjunctiva/sclera: Conjunctivae normal.      Pupils: Pupils are equal, round, and reactive to light.   Cardiovascular:      Rate and Rhythm: Normal rate and regular rhythm.   Pulmonary:      Effort: Pulmonary effort is normal.      Breath sounds: Normal breath sounds. No rhonchi or rales.   Abdominal:      General: Abdomen is flat.      Palpations: Abdomen is soft.      Tenderness: There is no abdominal tenderness. There is no guarding.   Genitourinary:     Comments: 3 Prolapsed hemorrhoids. Rectum clear of stool and without obstruction  Musculoskeletal:         General: Swelling present.      Cervical back: Normal range of motion.   Skin:     General: Skin is warm and dry.      Capillary Refill: Capillary refill takes less than 2 seconds.   Neurological:      General: No focal deficit present.      Mental Status: He is alert and oriented to person, place, and time.      Comments: Mild confusion   Psychiatric:         Mood and Affect: Mood normal.         Behavior: Behavior normal.         Fluids    Intake/Output Summary (Last 24 hours) at 3/21/2021 1148  Last data filed at 3/21/2021 1100  Gross per 24 hour   Intake 440 ml   Output 1300 ml   Net -860 ml       Laboratory  Recent Labs     03/19/21  0537 03/20/21  1150   WBC 10.3 7.7   RBC 3.96* 3.77*   HEMOGLOBIN 12.8* 12.3*   HEMATOCRIT 38.4*  36.3*   MCV 97.0 96.3   MCH 32.3 32.6   MCHC 33.3* 33.9   RDW 57.1* 56.8*   PLATELETCT 142* 117*   MPV 11.4 11.5     Recent Labs     03/19/21  0537 03/20/21  0547   SODIUM 129* 129*   POTASSIUM 5.0 5.0   CHLORIDE 96 96   CO2 24 25   GLUCOSE 180* 159*   BUN 31* 33*   CREATININE 0.94 0.96   CALCIUM 9.1 8.2*     Recent Labs     03/19/21  0537 03/20/21  0547   APTT 22.1* 22.6*   INR 1.23* 1.17*               Imaging  CT-ABDOMEN-PELVIS WITH   Final Result      1.  Ascites is no longer identified.      2.  Liver surface is mildly nodular consistent with cirrhosis.      3.  Splenomegaly is again noted. Punctate focus of decreased attenuation in the spleen could be due to normal differential enhancement although small hemangioma is also possible.      4.  Ventral abdominal wall hernia containing abdominal fat is again identified.      5.  Consolidation and volume loss is noted posteriorly in the right lower lobe which could indicate pneumonia.      6.  Diverticulosis.         DX-CHEST-PORTABLE (1 VIEW)   Final Result      Small right pleural effusion.      Right basilar opacities may represent atelectasis or scarring.         CT-HEAD W/O   Final Result      1.  Stable size of the extra-axial hemorrhage overlying the left cerebral hemisphere with sulci effacement and approximately 3 mm of left-to-right midline shift.         IR-CAROTID-CEREBRAL BILATERAL   Final Result         1.  Normal cerebral arteriogram.      2.  No evidence of arteriovenous malformation or intracranial aneurysm formation.      3.  No evidence of atherosclerotic disease in the right common femoral artery.      CT-CTA HEAD WITH & W/O-POST PROCESS   Final Result      No evidence of occlusive lesion or aneurysm.      Multiple tortuous small vessels along the anterior-inferior aspect of the left temporal lobe could represent some type of vascular malformation.      No significant interval change in left-sided holohemispheric subdural hematoma extending  along the falx and layering over the tentorium.      CT-CTA NECK WITH & W/O-POST PROCESSING   Final Result      CT angiogram of the neck within normal limits.      CT-HEAD W/O   Final Result         1. Grossly unchanged left hemispheric subdural hematoma, most in the left frontal and temporal region.   2. No new intracranial hemorrhage.                  CT-HEAD W/O   Final Result      1.  Stable large LEFT hemispheric subdural hematoma with associated mass effect and midline shift.   2.  No significant change from prior exam obtained 2 hours earlier.      CT-HEAD W/O   Final Result      Large LEFT holohemispheric subdural hematoma with associated mass effect and 4 mm LEFT to RIGHT midline shift.      These findings were discussed with EFREM FITZGERALD on 3/12/2021 11:40 AM.      DX-CHEST-PORTABLE (1 VIEW)   Final Result      1.  Hypoinflation, improved from prior.   2.  Minimal RIGHT lung base atelectasis or scar.   3.  Minimal RIGHT pleural effusion.           Assessment/Plan  * Subdural hematoma, acute (HCC)  Assessment & Plan  Clinically improved, but continues to complain of headache  Neurosurgery following and recommending continuing steroids and Keppra for seizure prophylaxis. On steroid taper  Obtain repeat CT scan 3/22  PT/OT/SLP following, recommending home health on discharge    History of prosthetic mitral valve  Assessment & Plan  With paroxysmal atrial fibrillation  Anticoagulation reversed given subdural hematoma  Resume anticoagulation when bleeding risk felt to be acceptable by neurosurgery    COPD (chronic obstructive pulmonary disease) ([facility])- (present on admission)  Assessment & Plan  Patient not in acute exacerbation or requiring oxygen  Continue to monitor    Gout- (present on admission)  Assessment & Plan  Continue allopurinol    Dehydration- (present on admission)  Assessment & Plan  Resolved    Type 2 diabetes mellitus without complication, without long-term current use of insulin ([facility])-  (present on admission)  Assessment & Plan  Patient with hyperglycemia worsened by steroids  A1C 5.9  Due to poorly controlled Hyperglycemia increase lantus 15u QAM and continue Insulin sliding scale      Thrombocytopenia (HCC)  Assessment & Plan  Likely due to cirrhosis  Continue to monitor    Cognitive deficits  Assessment & Plan  Evaluated by SLP and found to have cognitive deficits, recommending SNF  Unclear if baseline or sequelae of SDH  Will likely need SNF placement    Alcoholic cirrhosis of liver without ascites (HCC)  Assessment & Plan  Patient reports history of heavy alcohol use, has not drank in few years  Will need GI follow up as outpatient  Denies previous diagnosis of liver disease  Cirrhotic appearing liver on imaging without ascites  INR elevation representing liver dysfunction  Will send hepatitis panel and ammonia level  Mild elevation of ALT    Generalized abdominal pain  Assessment & Plan  No definitive etiology of pain, does have fat containing ventral hernia and diverticulosis without diverticulitis  No evidence of ascites  Continue to monitor    Venous stasis dermatitis of right lower extremity  Assessment & Plan  Does not appear infected on examination  Continue skin care per nursing protocol    Grade IV hemorrhoids  Assessment & Plan  Patient reports chronic hemorrhoids with occasional mild rectal bleeding  Noted this morning to have prolapsed hemorrhoids, tender with mild bleeding  Will prescribe lidocaine gel for pain relief  Hemorrhoids have improved today      Leukemoid reaction  Assessment & Plan  Patient with acute rise in WBC  No evidence of infection on CXR or UA  WBC trending down  Likely secondary to dexamethasone  Hold Abx for now as not septic and without fever  If fever would consider broad spectrum Abx    Hyponatremia  Assessment & Plan  Asymptomatic hyponatremia  With serum osmolality 290, likely pseudohyponatremia from hyperglycemia with component of cirrhosis  Continue to  monitor,  Better glucose control      Valvular cardiomyopathy (HCC)- (present on admission)  Assessment & Plan  History of bioprosthetic mitral valve replacement    Pacemaker  Assessment & Plan  History of pacemaker limiting ability to obtain MRI    Obesity (BMI 30-39.9)- (present on admission)  Assessment & Plan  Will need weight loss program on discharge    Benign prostatic hyperplasia without lower urinary tract symptoms- (present on admission)  Assessment & Plan  Continue Flomax  No evidence of urinary obstruction       VTE prophylaxis: SCDs due to SDH

## 2021-03-21 NOTE — CARE PLAN
Problem: Communication  Goal: The ability to communicate needs accurately and effectively will improve  3/21/2021 0000 by Saima Pena R.N.  Note: Pt A&OX4, Q4 neuro Checks in place. Follows commands and responds appropriately, will continue to round hourly, encourage the Pt to ask questions and voice feelings.    3/20/2021 2356 by Saima Pena R.N.  Outcome: PROGRESSING AS EXPECTED  Note: Pt A&OX4, Q4 neuro Checks in place. Follows commands and responds appropriately, will continue to round hourly, encourage the Pt to ask questions and voice feelings.       Problem: Skin Integrity  Goal: Risk for impaired skin integrity will decrease  Outcome: PROGRESSING AS EXPECTED  Note: Wound care of the right medial leg, Assessed with complete dressing change. Encourage adequate fluid and nutrition.

## 2021-03-21 NOTE — ASSESSMENT & PLAN NOTE
Unsure etiology  HIT negative  SPEP negative  Likely from Med: Keppra vs allopurinol?  Heme consulted (signed off 4/10): s/lp IVIG without significant improvement, unlikely ITP or autoimmune process. RF +, lupus anticoagulant negative, SPENCER 1:320 speckled, anti-ds negative, anti RO antibodies positive  - last plt transfusion 4/7, with improvement in plt 42-->60 (an hour after transfusion) -->66 next morning  -plt stable 60-->71-->81-->76  - cleared for dc and aspirin from heme

## 2021-03-22 NOTE — CARE PLAN
Problem: Communication  Goal: The ability to communicate needs accurately and effectively will improve  Outcome: PROGRESSING AS EXPECTED  Note: Pt A&OX4, Q4 neuro Checks in place per stroke protocol.  Follows commands and responds appropriately, will continue to round Q2 hourly round, encourage the Pt to ask questions and voice feelings.       Problem: Safety  Goal: Will remain free from falls  Outcome: PROGRESSING AS EXPECTED  Note: PT educated to call for assistance, Non-skid socks, bed alarm, bed in lowest position, X3 side rails, call light provided, offer frequent toileting, Urinal at bedside. Will continue to monitor Q2hr round.

## 2021-03-22 NOTE — THERAPY
"   03/22/21 1150   Other Treatments   Other Treatments Provided Pt declined PT c/o \"just feeling lousy\". Discussion regarding importance of getting up and moving even though he doesn't feel well. Pt is willing to participate tomorrow @ scheduled time.      "

## 2021-03-22 NOTE — DISCHARGE PLANNING
Agency/Facility Name: [facility]  Outcome: Left voice message for Frances regarding patient referral, requested a call back.

## 2021-03-22 NOTE — CARE PLAN
Problem: Communication  Goal: The ability to communicate needs accurately and effectively will improve  Outcome: PROGRESSING SLOWER THAN EXPECTED  Note: Pt impulsive and does not call appropriately. Pt AxO x4. Educated on use of call light and need for staff to assist pt when OOB. Pt verbalizes understanding. Will continue to reinforce education.     Problem: Pain Management  Goal: Pain level will decrease to patient's comfort goal  Outcome: PROGRESSING SLOWER THAN EXPECTED  Note: 0-10 pain scale used. Pt medicated per MAR for pain. Non-pharmacological comfort measures offered.

## 2021-03-22 NOTE — PROGRESS NOTES
Neurosurgery Progress Note    Subjective:  Pt in bed, c/o h/a and pain all over, denies dble/blurry vision, only walked to br and felt unsteady    Exam:  A/OX4, PERRLA, EOM intact  Face symmt, tongue midline  BIANCHI, no pronator drift        BP  Min: 102/61  Max: 113/70  Pulse  Av.8  Min: 80  Max: 82  Resp  Avg: 15.7  Min: 15  Max: 16  Temp  Av.1 °C (96.9 °F)  Min: 35.9 °C (96.7 °F)  Max: 36.2 °C (97.2 °F)  SpO2  Av.7 %  Min: 93 %  Max: 96 %    No data recorded    Recent Labs     21  1150 21  0239   WBC 7.7 10.8   RBC 3.77* 3.92*   HEMOGLOBIN 12.3* 12.8*   HEMATOCRIT 36.3* 37.9*   MCV 96.3 96.7   MCH 32.6 32.7   MCHC 33.9 33.8   RDW 56.8* 57.3*   PLATELETCT 117* 140*   MPV 11.5 11.8     Recent Labs     21  0547 21  0239   SODIUM 129* 126*   POTASSIUM 5.0 5.0   CHLORIDE 96 95*   CO2 25 25   GLUCOSE 159* 142*   BUN 33* 29*   CREATININE 0.96 0.86   CALCIUM 8.2* 8.3*     Recent Labs     21  0547   APTT 22.6*   INR 1.17*           Intake/Output       21 0700 - 21 0659 21 0700 - 21 0659      [id number] 4879-3717 Total  2875-5062 Total       Intake    P.O.  240  -- 240  --  -- --    P.O. 240 -- 240 -- -- --    Total Intake 240 -- 240 -- -- --       Output    Urine  600  1200 1800  350  -- 350    Urine Void (mL) 600 1200 1800 350 -- 350    Stool  --  -- --  --  -- --    Number of Times Stooled -- 1 x 1 x -- -- --    Total Output 600 1200 1800 350 -- 350       Net I/O     -360 -1200 -1560 -350 -- -350            Intake/Output Summary (Last 24 hours) at 3/22/2021 0925  Last data filed at 3/22/2021 0800  Gross per 24 hour   Intake --   Output 2150 ml   Net -2150 ml            • insulin regular  2-9 Units 4X/DAY ACHS    And   • glucose  16 g Q15 MIN PRN    And   • dextrose 50%  50 mL Q15 MIN PRN   • insulin glargine  15 Units QAM INSULIN   • dexamethasone  3 mg Q8HRS   • acetaminophen  650 mg Q6HRS   • levETIRAcetam  500 mg BID   • lisinopril  2.5 mg Q  DAY   • metoprolol tartrate  25 mg TWICE DAILY   • allopurinol  100 mg QAM   • tamsulosin  0.4 mg DAILY   • oxyCODONE immediate-release  5 mg Q4HRS PRN    Or   • oxyCODONE immediate-release  10 mg Q4HRS PRN   • morphine injection  2-4 mg Q3HRS PRN   • labetalol  10 mg Q4HRS PRN   • senna-docusate  2 tablet BID    And   • polyethylene glycol/lytes  1 Packet QDAY PRN    And   • magnesium hydroxide  30 mL QDAY PRN    And   • bisacodyl  10 mg QDAY PRN   • Respiratory Therapy Consult   Continuous RT   • LORazepam  2 mg Q5 MIN PRN   • hydrALAZINE  10 mg Q4HRS PRN   • enalaprilat  1.25 mg Q6HRS PRN   • prochlorperazine  10 mg Q6HRS PRN       Assessment and Plan:  Hospital day # 11  POD # n/a  Subdural blood in a strange location along the frontal skull base and some left fronto-temporal. Mass effect is mild to moderate  Normal angiogram    NM as above - stable  PT/OT to assist with activity    Prophylactic anticoagulation:no due to sdh   Decadron 3mg Q8  CT 3/22- to be reviewed by Dr. Tristan Irving

## 2021-03-22 NOTE — PROGRESS NOTES
"Hospital Medicine Daily Progress Note    Date of Service  3/22/2021    Chief Complaint  65 y.o. male admitted 3/12/2021 after being found down    Hospital Course  65 y.o. male who presented 3/12/2021 with history of bioprosthetic mitral valve replacement atrial fibrillation on chronic anticoagulation with warfarin transferred to the emergency department after he was noted to be altered and having right upper extremity weakness and aphasia.  He was noted to have large left holohemispheric subdural hematoma.  He was admitted to the intensive care unit his coagulopathy was reversed with Kcentra and was evaluated by neurosurgery.  He has a history of pacemaker and was unable to have an MRI.  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    Patient was transferred out of ICU 3/16. Neurosurgery following patient and recommended steroids (dexamethasone 4mg every 6 hours) and Keppra 500mg Q12 hours for seizure prophylaxis.    Patient was evaluated by SLP and cognitive evaluation performed showing significant cognitive deficits, necessitating SNF. Discussed with patient's friend who reports that he is not back to baseline.    Patient underwent surveillance CT scan on 3/22 which showed slight enlargement to 1.2cm and slightly increased midline shift. Neurosurgery continued to follow case. Decadron decreased to 3mg every 8 hours.    Interval Problem Update    Patient continues to complain of headache and malaise. Discussed with patient's friend who states that patient is still \"slow\" and not at his normal baseline.    Consultants/Specialty  Neurosurgery  Critical Care    Code Status  Full Code    Disposition  Likely SNF due to cognitive deficits    Review of Systems  Review of Systems   Constitutional: Positive for malaise/fatigue. Negative for chills and fever.   HENT: Negative.  Negative for hearing loss and tinnitus. "    Eyes: Negative for blurred vision, double vision and photophobia.   Respiratory: Positive for cough. Negative for hemoptysis and sputum production.    Cardiovascular: Negative for chest pain, palpitations and orthopnea.   Gastrointestinal: Negative for abdominal pain, heartburn, nausea and vomiting.   Genitourinary: Positive for dysuria. Negative for frequency and urgency.   Musculoskeletal: Negative for back pain, myalgias and neck pain.   Skin: Negative.  Negative for rash.   Neurological: Positive for headaches. Negative for focal weakness and weakness.   Psychiatric/Behavioral: Negative for hallucinations, substance abuse and suicidal ideas.        Physical Exam  Temp:  [35.9 °C (96.7 °F)-36.7 °C (98.1 °F)] 36.7 °C (98.1 °F)  Pulse:  [80-82] 82  Resp:  [15-17] 17  BP: (102-113)/(61-77) 105/73  SpO2:  [93 %-97 %] 97 %    Physical Exam  Constitutional:       General: He is not in acute distress.  HENT:      Head: Normocephalic.      Nose: Nose normal.      Mouth/Throat:      Mouth: Mucous membranes are moist.      Pharynx: Oropharynx is clear.   Eyes:      Conjunctiva/sclera: Conjunctivae normal.      Pupils: Pupils are equal, round, and reactive to light.   Cardiovascular:      Rate and Rhythm: Normal rate and regular rhythm.   Pulmonary:      Effort: Pulmonary effort is normal.      Breath sounds: Normal breath sounds. No rhonchi or rales.   Abdominal:      General: Abdomen is flat.      Palpations: Abdomen is soft.      Tenderness: There is no abdominal tenderness. There is no guarding.   Genitourinary:     Comments: 3 Prolapsed hemorrhoids. Rectum clear of stool and without obstruction  Musculoskeletal:         General: Swelling present.      Cervical back: Normal range of motion.   Skin:     General: Skin is warm and dry.      Capillary Refill: Capillary refill takes less than 2 seconds.   Neurological:      General: No focal deficit present.      Mental Status: He is alert and oriented to person, place,  and time.      Comments: Mild confusion   Psychiatric:         Mood and Affect: Mood normal.         Behavior: Behavior normal.         Fluids    Intake/Output Summary (Last 24 hours) at 3/22/2021 1206  Last data filed at 3/22/2021 1137  Gross per 24 hour   Intake 200 ml   Output 1950 ml   Net -1750 ml       Laboratory  Recent Labs     03/20/21  1150 03/22/21  0239   WBC 7.7 10.8   RBC 3.77* 3.92*   HEMOGLOBIN 12.3* 12.8*   HEMATOCRIT 36.3* 37.9*   MCV 96.3 96.7   MCH 32.6 32.7   MCHC 33.9 33.8   RDW 56.8* 57.3*   PLATELETCT 117* 140*   MPV 11.5 11.8     Recent Labs     03/20/21  0547 03/22/21  0239 03/22/21  1020   SODIUM 129* 126* 125*   POTASSIUM 5.0 5.0 4.7   CHLORIDE 96 95* 92*   CO2 25 25 25   GLUCOSE 159* 142* 215*   BUN 33* 29* 29*   CREATININE 0.96 0.86 1.02   CALCIUM 8.2* 8.3* 8.5     Recent Labs     03/20/21  0547   APTT 22.6*   INR 1.17*               Imaging  CT-HEAD W/O   Final Result      Left extra-axial hematoma is again noted. Subacute blood closer to the vertex along the left frontoparietal lobes is mildly increased compared to prior measuring 1.2 compared to 1 cm.      Mass effect is again noted with 4.6 mm midline shift to the right, slightly increased compared to prior.      CT-ABDOMEN-PELVIS WITH   Final Result      1.  Ascites is no longer identified.      2.  Liver surface is mildly nodular consistent with cirrhosis.      3.  Splenomegaly is again noted. Punctate focus of decreased attenuation in the spleen could be due to normal differential enhancement although small hemangioma is also possible.      4.  Ventral abdominal wall hernia containing abdominal fat is again identified.      5.  Consolidation and volume loss is noted posteriorly in the right lower lobe which could indicate pneumonia.      6.  Diverticulosis.         DX-CHEST-PORTABLE (1 VIEW)   Final Result      Small right pleural effusion.      Right basilar opacities may represent atelectasis or scarring.         CT-HEAD W/O    Final Result      1.  Stable size of the extra-axial hemorrhage overlying the left cerebral hemisphere with sulci effacement and approximately 3 mm of left-to-right midline shift.         IR-CAROTID-CEREBRAL BILATERAL   Final Result         1.  Normal cerebral arteriogram.      2.  No evidence of arteriovenous malformation or intracranial aneurysm formation.      3.  No evidence of atherosclerotic disease in the right common femoral artery.      CT-CTA HEAD WITH & W/O-POST PROCESS   Final Result      No evidence of occlusive lesion or aneurysm.      Multiple tortuous small vessels along the anterior-inferior aspect of the left temporal lobe could represent some type of vascular malformation.      No significant interval change in left-sided holohemispheric subdural hematoma extending along the falx and layering over the tentorium.      CT-CTA NECK WITH & W/O-POST PROCESSING   Final Result      CT angiogram of the neck within normal limits.      CT-HEAD W/O   Final Result         1. Grossly unchanged left hemispheric subdural hematoma, most in the left frontal and temporal region.   2. No new intracranial hemorrhage.                  CT-HEAD W/O   Final Result      1.  Stable large LEFT hemispheric subdural hematoma with associated mass effect and midline shift.   2.  No significant change from prior exam obtained 2 hours earlier.      CT-HEAD W/O   Final Result      Large LEFT holohemispheric subdural hematoma with associated mass effect and 4 mm LEFT to RIGHT midline shift.      These findings were discussed with EFREM FITZGERALD on 3/12/2021 11:40 AM.      DX-CHEST-PORTABLE (1 VIEW)   Final Result      1.  Hypoinflation, improved from prior.   2.  Minimal RIGHT lung base atelectasis or scar.   3.  Minimal RIGHT pleural effusion.           Assessment/Plan  * Subdural hematoma, acute (HCC)  Assessment & Plan  Continues to have headache  Neurosurgery following and recommending continuing steroids and Keppra for  seizure prophylaxis. On steroid taper  Repeat CT scan 3/22: 1.2cm extra axial hematoma (from 1cm) with 4.6mm midline shift to right slightly greater compared to prior imaging  Neurosurgery following  PT/OT/SLP following, continue therapy    History of prosthetic mitral valve  Assessment & Plan  With paroxysmal atrial fibrillation  Anticoagulation reversed given subdural hematoma  Resume anticoagulation when bleeding risk felt to be acceptable by neurosurgery    COPD (chronic obstructive pulmonary disease) (HCC)- (present on admission)  Assessment & Plan  Patient not in acute exacerbation or requiring oxygen  Continue to monitor    Gout- (present on admission)  Assessment & Plan  Continue allopurinol    Dehydration- (present on admission)  Assessment & Plan  Resolved    Type 2 diabetes mellitus without complication, without long-term current use of insulin (HCC)- (present on admission)  Assessment & Plan  Patient with hyperglycemia worsened by steroids  A1C 5.9  Due to poorly controlled Hyperglycemia increase lantus 15u QAM and continue Insulin sliding scale      Thrombocytopenia (HCC)  Assessment & Plan  Likely due to cirrhosis  Continue to monitor    Cognitive deficits  Assessment & Plan  Evaluated by SLP and found to have cognitive deficits, recommending SNF  Cognitive deficits likely secondary to SDH  Discussed with friend, he is not at previous baseline  Will likely need SNF placement    Alcoholic cirrhosis of liver without ascites (HCC)  Assessment & Plan  Patient reports history of heavy alcohol use, has not drank in few years  Will need GI follow up as outpatient  Denies previous diagnosis of liver disease  Cirrhotic appearing liver on imaging without ascites  INR elevation representing liver dysfunction  Will send hepatitis panel and ammonia level  Mild elevation of ALT    Generalized abdominal pain  Assessment & Plan  No definitive etiology of pain, does have fat containing ventral hernia and diverticulosis  without diverticulitis  No evidence of ascites  Continue to monitor    Venous stasis dermatitis of right lower extremity  Assessment & Plan  Does not appear infected on examination  Continue skin care per nursing protocol    Grade IV hemorrhoids  Assessment & Plan  Patient reports chronic hemorrhoids with occasional mild rectal bleeding  Noted this morning to have prolapsed hemorrhoids, tender with mild bleeding  Will prescribe lidocaine gel for pain relief  Hemorrhoids have improved today      Leukemoid reaction  Assessment & Plan  Patient with acute rise in WBC  No evidence of infection on CXR or UA  WBC trending down  Likely secondary to dexamethasone  Hold Abx for now as not septic and without fever  If fever would consider broad spectrum Abx    Hyponatremia  Assessment & Plan  Asymptomatic hyponatremia  Hyponatremia worsening 3/22 now down to 126  Likely SIADH  Free water restriction  Due to subdural hematoma will start salt supplementation  Trend sodium every 8 hours    Valvular cardiomyopathy (HCC)- (present on admission)  Assessment & Plan  History of bioprosthetic mitral valve replacement    Pacemaker  Assessment & Plan  History of pacemaker limiting ability to obtain MRI    Obesity (BMI 30-39.9)- (present on admission)  Assessment & Plan  Will need weight loss program on discharge    Benign prostatic hyperplasia without lower urinary tract symptoms- (present on admission)  Assessment & Plan  Continue Flomax  No evidence of urinary obstruction       VTE prophylaxis: SCDs due to SDH

## 2021-03-23 NOTE — THERAPY
Physical Therapy   Daily Treatment     Patient Name: Morales Olivier  Age:  65 y.o., Sex:  male  Medical Record #: [id number]  Today's Date: 3/23/2021     Precautions: (P) Swallow Precautions ( See Comments)    Assessment    Pt angry about having to get up and amb short distance. Pt is @ supervised/indep level w/bed mobility and min assist w/amb using the FWW. Pt would only amb minimal distances 2* angry he had to get OOB.     Plan    Continue current treatment plan.    DC Equipment Recommendations: Unable to determine at this time  Discharge Recommendations: Recommend home health for continued physical therapy services     Objective       03/23/21 1448   Other Treatments   Other Treatments Provided Time needed to encourage pt to participate @ time that was set yesterday. Pt cont to c/o not feeling well.    Balance   Sitting Balance (Static) Fair +   Sitting Balance (Dynamic) Fair +   Standing Balance (Static) Fair   Standing Balance (Dynamic) Fair   Weight Shift Sitting Good   Weight Shift Standing Good   Comments w/FWW   Gait Analysis   Gait Level Of Assist Supervised   Assistive Device Front Wheel Walker   Distance (Feet) 25   # of Times Distance was Traveled 1   Skilled Intervention Verbal Cuing   Comments Pt angry that he has to ambulate today. Pt only would manage 25', reluctantly.    Bed Mobility    Supine to Sit Supervised   Sit to Supine Supervised   Scooting Supervised   Rolling Modified Independent   Functional Mobility   Sit to Stand Supervised   Bed, Chair, Wheelchair Transfer Supervised  (w/FWW)   How much difficulty does the patient currently have...   Turning over in bed (including adjusting bedclothes, sheets and blankets)? 4   Sitting down on and standing up from a chair with arms (e.g., wheelchair, bedside commode, etc.) 3   Moving from lying on back to sitting on the side of the bed? 4   How much help from another person does the patient currently need...   Moving to and from a bed to a chair  (including a wheelchair)? 3   Need to walk in a hospital room? 3   Climbing 3-5 steps with a railing? 3   6 clicks Mobility Score 20   Short Term Goals    Short Term Goal # 3 Patient will ambulate 50ft with supervision within 6tx in order to access environment   Goal Outcome # 3 Goal not met

## 2021-03-23 NOTE — PROGRESS NOTES
Hospital Medicine Daily Progress Note    Date of Service  3/23/2021    Chief Complaint  65 y.o. male admitted 3/12/2021 after being found down    Hospital Course  65 y.o. male who presented 3/12/2021 with history of bioprosthetic mitral valve replacement atrial fibrillation on chronic anticoagulation with warfarin transferred to the emergency department after he was noted to be altered and having right upper extremity weakness and aphasia.  He was noted to have large left holohemispheric subdural hematoma.  He was admitted to the intensive care unit his coagulopathy was reversed with Kcentra and was evaluated by neurosurgery.  He has a history of pacemaker and was unable to have an MRI.  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    Patient was transferred out of ICU 3/16. Neurosurgery following patient and recommended steroids (dexamethasone 4mg every 6 hours) and Keppra 500mg Q12 hours for seizure prophylaxis.    Patient was evaluated by SLP and cognitive evaluation performed showing significant cognitive deficits, necessitating SNF. Discussed with patient's friend who reports that he is not back to baseline.    Patient underwent surveillance CT scan on 3/22 which showed slight enlargement to 1.2cm and slightly increased midline shift. Neurosurgery continued to follow case. Decadron decreased to 3mg every 8 hours.    Interval Problem Update    Ongoing h/a, diffuse, no new deficits  On oxy prn    Consultants/Specialty  Neurosurgery  Critical Care    Code Status  Full Code    Disposition  Likely SNF due to cognitive deficits  Surgery thursday    Review of Systems  Review of Systems   Constitutional: Positive for malaise/fatigue. Negative for chills, diaphoresis and fever.   HENT: Negative.  Negative for hearing loss.    Eyes: Negative for blurred vision and pain.   Respiratory: Positive for cough. Negative  for hemoptysis.    Cardiovascular: Negative for chest pain, palpitations and orthopnea.   Gastrointestinal: Negative for heartburn and nausea.   Genitourinary: Positive for dysuria. Negative for urgency.   Musculoskeletal: Negative for back pain and myalgias.   Skin: Negative.  Negative for rash.   Neurological: Positive for headaches. Negative for dizziness, sensory change, speech change, focal weakness and weakness.   Psychiatric/Behavioral: Negative for hallucinations, substance abuse and suicidal ideas.        Physical Exam  Temp:  [36 °C (96.8 °F)-36.5 °C (97.7 °F)] 36.1 °C (97 °F)  Pulse:  [78-80] 80  Resp:  [16-17] 17  BP: ()/(65-75) 106/72  SpO2:  [96 %-98 %] 97 %    Physical Exam  Constitutional:       General: He is not in acute distress.     Appearance: He is not ill-appearing.   HENT:      Head: Normocephalic.      Nose: Nose normal.      Mouth/Throat:      Mouth: Mucous membranes are moist.      Pharynx: Oropharynx is clear.   Eyes:      Extraocular Movements: Extraocular movements intact.      Conjunctiva/sclera: Conjunctivae normal.      Pupils: Pupils are equal, round, and reactive to light.   Cardiovascular:      Rate and Rhythm: Normal rate and regular rhythm.   Pulmonary:      Effort: Pulmonary effort is normal.      Breath sounds: Normal breath sounds. No rhonchi or rales.   Abdominal:      General: Abdomen is flat. There is no distension.      Palpations: Abdomen is soft.   Genitourinary:     Comments: 3 Prolapsed hemorrhoids. Rectum clear of stool and without obstruction  Musculoskeletal:         General: Swelling present. No tenderness.      Cervical back: Normal range of motion.   Skin:     General: Skin is warm and dry.      Capillary Refill: Capillary refill takes less than 2 seconds.   Neurological:      General: No focal deficit present.      Mental Status: He is alert and oriented to person, place, and time.      Cranial Nerves: No cranial nerve deficit.      Sensory: No sensory  deficit.      Motor: Weakness present.      Comments: Mild confusion   Psychiatric:         Mood and Affect: Mood normal.         Behavior: Behavior normal.         Fluids    Intake/Output Summary (Last 24 hours) at 3/23/2021 1252  Last data filed at 3/23/2021 1153  Gross per 24 hour   Intake 340 ml   Output 1000 ml   Net -660 ml       Laboratory  Recent Labs     03/22/21  0239 03/23/21  0220   WBC 10.8 9.5   RBC 3.92* 3.94*   HEMOGLOBIN 12.8* 12.7*   HEMATOCRIT 37.9* 38.9*   MCV 96.7 98.7*   MCH 32.7 32.2   MCHC 33.8 32.6*   RDW 57.3* 59.4*   PLATELETCT 140* 101*   MPV 11.8 11.6     Recent Labs     03/22/21  1655 03/23/21  0220 03/23/21  1043   SODIUM 129* 129* 128*   POTASSIUM 4.8 4.7 4.4   CHLORIDE 98 100 95*   CO2 23 24 26   GLUCOSE 173* 140* 183*   BUN 26* 29* 26*   CREATININE 0.85 1.02 0.98   CALCIUM 8.4* 8.3* 8.3*                   Imaging  CT-HEAD W/O   Final Result      Left extra-axial hematoma is again noted. Subacute blood closer to the vertex along the left frontoparietal lobes is mildly increased compared to prior measuring 1.2 compared to 1 cm.      Mass effect is again noted with 4.6 mm midline shift to the right, slightly increased compared to prior.      CT-ABDOMEN-PELVIS WITH   Final Result      1.  Ascites is no longer identified.      2.  Liver surface is mildly nodular consistent with cirrhosis.      3.  Splenomegaly is again noted. Punctate focus of decreased attenuation in the spleen could be due to normal differential enhancement although small hemangioma is also possible.      4.  Ventral abdominal wall hernia containing abdominal fat is again identified.      5.  Consolidation and volume loss is noted posteriorly in the right lower lobe which could indicate pneumonia.      6.  Diverticulosis.         DX-CHEST-PORTABLE (1 VIEW)   Final Result      Small right pleural effusion.      Right basilar opacities may represent atelectasis or scarring.         CT-HEAD W/O   Final Result      1.   Stable size of the extra-axial hemorrhage overlying the left cerebral hemisphere with sulci effacement and approximately 3 mm of left-to-right midline shift.         IR-CAROTID-CEREBRAL BILATERAL   Final Result         1.  Normal cerebral arteriogram.      2.  No evidence of arteriovenous malformation or intracranial aneurysm formation.      3.  No evidence of atherosclerotic disease in the right common femoral artery.      CT-CTA HEAD WITH & W/O-POST PROCESS   Final Result      No evidence of occlusive lesion or aneurysm.      Multiple tortuous small vessels along the anterior-inferior aspect of the left temporal lobe could represent some type of vascular malformation.      No significant interval change in left-sided holohemispheric subdural hematoma extending along the falx and layering over the tentorium.      CT-CTA NECK WITH & W/O-POST PROCESSING   Final Result      CT angiogram of the neck within normal limits.      CT-HEAD W/O   Final Result         1. Grossly unchanged left hemispheric subdural hematoma, most in the left frontal and temporal region.   2. No new intracranial hemorrhage.                  CT-HEAD W/O   Final Result      1.  Stable large LEFT hemispheric subdural hematoma with associated mass effect and midline shift.   2.  No significant change from prior exam obtained 2 hours earlier.      CT-HEAD W/O   Final Result      Large LEFT holohemispheric subdural hematoma with associated mass effect and 4 mm LEFT to RIGHT midline shift.      These findings were discussed with EFREM FITZGERALD on 3/12/2021 11:40 AM.      DX-CHEST-PORTABLE (1 VIEW)   Final Result      1.  Hypoinflation, improved from prior.   2.  Minimal RIGHT lung base atelectasis or scar.   3.  Minimal RIGHT pleural effusion.           Assessment/Plan  * Subdural hematoma, acute (HCC)  Assessment & Plan  Continues to have headache  Neurosurgery following and recommending continuing steroids and Keppra for seizure prophylaxis. On  steroid taper  Repeat CT scan 3/22: 1.2cm extra axial hematoma (from 1cm) with 4.6mm midline shift to right slightly greater compared to prior imaging  Neurosurgery following  PT/OT/SLP following, continue therapy    3/23 plan for intervention Thursday, per NSG  - h/a, oxy prn  - no new deficits    History of prosthetic mitral valve  Assessment & Plan  With paroxysmal atrial fibrillation  Anticoagulation reversed given subdural hematoma  Resume anticoagulation when bleeding risk felt to be acceptable by neurosurgery    COPD (chronic obstructive pulmonary disease) (HCC)- (present on admission)  Assessment & Plan  Patient not in acute exacerbation or requiring oxygen  Continue to monitor    Gout- (present on admission)  Assessment & Plan  Continue allopurinol    Dehydration- (present on admission)  Assessment & Plan  Resolved    Type 2 diabetes mellitus without complication, without long-term current use of insulin (HCC)- (present on admission)  Assessment & Plan  Patient with hyperglycemia worsened by steroids  A1C 5.9  Due to poorly controlled Hyperglycemia increase lantus 15u QAM and continue Insulin sliding scale      Thrombocytopenia (HCC)  Assessment & Plan  Likely due to cirrhosis  Continue to monitor    Cognitive deficits  Assessment & Plan  Evaluated by SLP and found to have cognitive deficits, recommending SNF  Cognitive deficits likely secondary to SDH  Discussed with friend, he is not at previous baseline    3/23 Will likely need SNF placement, order placed  CM to assist  Accepted by HH  Noncompliance with pt/ot  Encourage activity    Alcoholic cirrhosis of liver without ascites (HCC)  Assessment & Plan  Patient reports history of heavy alcohol use, has not drank in few years  Will need GI follow up as outpatient  Denies previous diagnosis of liver disease  Cirrhotic appearing liver on imaging without ascites  INR elevation representing liver dysfunction  Will send hepatitis panel and ammonia level  Mild  elevation of ALT    Generalized abdominal pain  Assessment & Plan  No definitive etiology of pain, does have fat containing ventral hernia and diverticulosis without diverticulitis  No evidence of ascites  Continue to monitor    Venous stasis dermatitis of right lower extremity  Assessment & Plan  Does not appear infected on examination  Continue skin care per nursing protocol    Grade IV hemorrhoids  Assessment & Plan  Patient reports chronic hemorrhoids with occasional mild rectal bleeding  Noted this morning to have prolapsed hemorrhoids, tender with mild bleeding  Will prescribe lidocaine gel for pain relief  Hemorrhoids have improved today      Leukemoid reaction  Assessment & Plan  Patient with acute rise in WBC  No evidence of infection on CXR or UA  WBC trending down  Likely secondary to dexamethasone  Hold Abx for now as not septic and without fever  If fever would consider broad spectrum Abx    Hyponatremia  Assessment & Plan  Asymptomatic hyponatremia  Hyponatremia worsening 3/22 now down to 126  3/23 stable at 129, cont current management  Likely SIADH  Free water restriction  Due to subdural hematoma will start salt supplementation  Trend sodium every 12 hours    Valvular cardiomyopathy (HCC)- (present on admission)  Assessment & Plan  History of bioprosthetic mitral valve replacement    Pacemaker  Assessment & Plan  History of pacemaker limiting ability to obtain MRI    Obesity (BMI 30-39.9)- (present on admission)  Assessment & Plan  Will need weight loss program on discharge    Benign prostatic hyperplasia without lower urinary tract symptoms- (present on admission)  Assessment & Plan  Continue Flomax  No evidence of urinary obstruction       VTE prophylaxis: SCDs due to SDH

## 2021-03-23 NOTE — THERAPY
"Occupational Therapy  Daily Treatment     Patient Name: Morales Olivier  Age:  65 y.o., Sex:  male  Medical Record #: [id number]  Today's Date: 3/23/2021     Precautions  Precautions: (P) Swallow Precautions ( See Comments)  Comments: (P) Possible Crani on 3/25    Assessment    Pt was seen for OT treatment. Pt needed increased encouragement to participate. Pt worked on LB and Upper body dressing seated EOB and long sitting in bed for sock doffing and donning. H/G done seated EOB  Pt refusing to do in standing. Pt easily frustrated and angry about needing to work on increasing general strength and activity tolerance which is limited by pt's behavior. Pt did agree to do all tasks offered. Pt is making progress towards OT goals. RN updated on OT treatment findings and recommendations . Will continue OT POC as pt is willing to participate.     Plan    Continue current treatment plan.    DC Equipment Recommendations: (P) Unable to determine at this time  Discharge Recommendations: (P) Recommend post-acute placement for additional occupational therapy services prior to discharge home    Subjective    \"Why can't you just leave me alone\". \"I just want to go home and care for myself\".      Objective       03/23/21 1519   Cognition    Cognition / Consciousness X   Speech/ Communication Delayed Responses   Orientation Level Not Oriented to Address;Not Oriented to Year;Not Oriented to Day;Not Oriented to Time;Not Oriented to Reason   Level of Consciousness Alert   Safety Awareness Impaired;Impulsive   Attention Impaired   Comments Pt agitated and frustrated with any therapy. Cooperative , angry. Poor insight into his deficits.    Passive ROM Upper Body   Comments WFL   Active ROM Upper Body   Dominant Hand Right   Comments WFL   Strength Upper Body   Comments WFL for simple tasks.    Other Treatments   Other Treatments Provided Psychosocial intervention and extended time to encourage pt to participate.    Balance   Sitting " Balance (Static) Fair +   Sitting Balance (Dynamic) Fair +   Standing Balance (Static) Fair   Standing Balance (Dynamic) Fair   Weight Shift Sitting Good   Weight Shift Standing Good   Skilled Intervention Tactile Cuing;Verbal Cuing;Sequencing;Compensatory Strategies   Comments with FWW   Bed Mobility    Supine to Sit Supervised   Sit to Supine Supervised   Scooting Supervised   Rolling Supervised   Skilled Intervention Verbal Cuing   Comments Pt can be impulsive, cues for self pacing.    Activities of Daily Living   Grooming Minimal Assist;Seated   Bathing   (refused)   Upper Body Dressing Minimal Assist   Lower Body Dressing Minimal Assist   Toileting Supervision  (to use urinal in standing. Full toilet hygiene not assessed.)   Skilled Intervention Verbal Cuing   Functional Mobility   Sit to Stand Supervised   Bed, Chair, Wheelchair Transfer Supervised   Toilet Transfers   (refused)   Transfer Method Stand Step   Skilled Intervention Tactile Cuing;Verbal Cuing;Sequencing;Compensatory Strategies   Comments with FWW   Activity Tolerance   Sitting in Chair refused   Sitting Edge of Bed 20 mins    Standing 2 mins X2   Comments low activity tolerance, limited by behavior.    Patient / Family Goals   Patient / Family Goal #1 to not feel so bad   Goal #1 Outcome Progressing as expected   Short Term Goals   Short Term Goal # 1 Pt will demo LB dressing using AE PRN w/ SPV   Goal Outcome # 1 Progressing as expected   Short Term Goal # 2 Pt will demo standing grooming w/ SPV   Goal Outcome # 2 Progressing as expected   Short Term Goal # 3 Pt will demo toilet txf w/ SPV   Goal Outcome # 3 Progressing as expected   Short Term Goal # 4 Pt will demo toilet hygiene w/ SPV   Goal Outcome # 4 Progressing as expected   Anticipated Discharge Equipment and Recommendations   DC Equipment Recommendations Unable to determine at this time   Discharge Recommendations Recommend post-acute placement for additional occupational therapy  services prior to discharge home   Interdisciplinary Plan of Care Collaboration   IDT Collaboration with  Nursing   Collaboration Comments RN updated

## 2021-03-23 NOTE — PROGRESS NOTES
Neurosurgery    Patient complaining of increasing headache.   CT from  Yesterday shows growth of chronic portion of subdural  He remains intact neurologically but has diminished spontaneity    Plan for Craniotomy for subdural on Thursday at 7 am    Will evaluate labs and obtain consent.

## 2021-03-23 NOTE — CARE PLAN
Problem: Safety  Goal: Will remain free from falls  Outcome: PROGRESSING AS EXPECTED  Note: Fall precautions in place. Bed alarm and chair alarm in use. Treaded socks and fall wristband on. Bed locked and in lowest position. Side rails up x3. All belongings and call light within reach. Hourly rounding to meet pt needs.      Problem: Communication  Goal: The ability to communicate needs accurately and effectively will improve  Outcome: PROGRESSING SLOWER THAN EXPECTED  Note: Pt AxO x3. Does not always call appropriately to communicate needs. Pt educated on use of call light as a safety measure. Pt verbalizes understanding. All needs or concerns met at this time.

## 2021-03-23 NOTE — CARE PLAN
Problem: Bowel/Gastric:  Goal: Normal bowel function is maintained or improved  Outcome: PROGRESSING AS EXPECTED     Problem: Fluid Volume:  Goal: Will maintain balanced intake and output  Outcome: PROGRESSING AS EXPECTED     Problem: Skin Integrity  Goal: Risk for impaired skin integrity will decrease  Outcome: PROGRESSING AS EXPECTED     Problem: Psychosocial Needs:  Goal: Level of anxiety will decrease  Outcome: PROGRESSING AS EXPECTED     Problem: Knowledge Deficit  Goal: Knowledge of the prescribed therapeutic regimen will improve  Outcome: PROGRESSING SLOWER THAN EXPECTED  Intervention: Discuss information regarding therpeutic regimen and document in education  Note: Patient disoriented to event, requires profound encouragement to participate in care. Patient reoriented to situation and educated on therapeutic regimen, medications, and plan of care.

## 2021-03-24 NOTE — CARE PLAN
Problem: Bowel/Gastric:  Goal: Normal bowel function is maintained or improved  Outcome: PROGRESSING AS EXPECTED     Problem: Skin Integrity  Goal: Risk for impaired skin integrity will decrease  Outcome: PROGRESSING AS EXPECTED     Problem: Respiratory:  Goal: Respiratory status will improve  Outcome: PROGRESSING AS EXPECTED     Problem: Urinary Elimination:  Goal: Ability to reestablish a normal urinary elimination pattern will improve  Outcome: PROGRESSING AS EXPECTED

## 2021-03-24 NOTE — PROGRESS NOTES
Hospital Medicine Daily Progress Note    Date of Service  3/24/2021    Chief Complaint  65 y.o. male admitted 3/12/2021 after being found down    Hospital Course  65 y.o. male who presented 3/12/2021 with history of bioprosthetic mitral valve replacement atrial fibrillation on chronic anticoagulation with warfarin transferred to the emergency department after he was noted to be altered and having right upper extremity weakness and aphasia.  He was noted to have large left holohemispheric subdural hematoma.  He was admitted to the intensive care unit his coagulopathy was reversed with Kcentra and was evaluated by neurosurgery.  He has a history of pacemaker and was unable to have an MRI.  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    Patient was transferred out of ICU 3/16. Neurosurgery following patient and recommended steroids (dexamethasone 4mg every 6 hours) and Keppra 500mg Q12 hours for seizure prophylaxis.    Patient was evaluated by SLP and cognitive evaluation performed showing significant cognitive deficits, necessitating SNF. Discussed with patient's friend who reports that he is not back to baseline.    Patient underwent surveillance CT scan on 3/22 which showed slight enlargement to 1.2cm and slightly increased midline shift. Neurosurgery continued to follow case. Decadron decreased to 3mg every 8 hours.    Interval Problem Update    Diffuse h/a unchaged,   Min improvement with rx    Consultants/Specialty  Neurosurgery  Critical Care    Code Status  Full Code    Disposition  Likely SNF due to cognitive deficits  Surgery thursday    Review of Systems  Review of Systems   Constitutional: Negative for chills, fever and malaise/fatigue.   HENT: Negative.  Negative for hearing loss.    Eyes: Negative for blurred vision, double vision and pain.   Respiratory: Negative for cough and hemoptysis.     Cardiovascular: Negative for chest pain, palpitations and orthopnea.   Gastrointestinal: Negative for heartburn and nausea.   Genitourinary: Negative for dysuria and urgency.   Musculoskeletal: Negative for back pain and myalgias.   Skin: Negative.  Negative for rash.   Neurological: Positive for headaches. Negative for dizziness, sensory change, speech change, focal weakness and weakness.   Psychiatric/Behavioral: Negative for depression, hallucinations and suicidal ideas.        Physical Exam  Temp:  [35.8 °C (96.5 °F)-36.2 °C (97.2 °F)] 36.2 °C (97.2 °F)  Pulse:  [77-81] 81  Resp:  [16-17] 17  BP: ()/(59-80) 103/79  SpO2:  [96 %-98 %] 98 %    Physical Exam  Constitutional:       General: He is not in acute distress.     Appearance: He is not ill-appearing or diaphoretic.   HENT:      Head: Normocephalic.      Nose: Nose normal.      Mouth/Throat:      Mouth: Mucous membranes are moist.      Pharynx: Oropharynx is clear.   Eyes:      Conjunctiva/sclera: Conjunctivae normal.      Pupils: Pupils are equal, round, and reactive to light.   Cardiovascular:      Rate and Rhythm: Normal rate and regular rhythm.   Pulmonary:      Effort: Pulmonary effort is normal. No respiratory distress.      Breath sounds: Normal breath sounds. No stridor. No rhonchi.   Abdominal:      General: Abdomen is flat. There is no distension.      Palpations: Abdomen is soft.   Genitourinary:     Comments: 3 Prolapsed hemorrhoids. Rectum clear of stool and without obstruction  Musculoskeletal:         General: No swelling or tenderness.      Cervical back: Normal range of motion.   Skin:     General: Skin is warm and dry.      Capillary Refill: Capillary refill takes less than 2 seconds.   Neurological:      General: No focal deficit present.      Mental Status: He is alert and oriented to person, place, and time.      Cranial Nerves: No cranial nerve deficit.      Sensory: No sensory deficit.      Motor: Weakness present.       Comments: Mild confusion   Psychiatric:         Mood and Affect: Mood normal.         Behavior: Behavior normal.         Fluids    Intake/Output Summary (Last 24 hours) at 3/24/2021 1035  Last data filed at 3/23/2021 2300  Gross per 24 hour   Intake 340 ml   Output 800 ml   Net -460 ml       Laboratory  Recent Labs     03/22/21  0239 03/23/21  0220 03/24/21  0856   WBC 10.8 9.5 13.9*   RBC 3.92* 3.94* 4.17*   HEMOGLOBIN 12.8* 12.7* 13.7*   HEMATOCRIT 37.9* 38.9* 40.9*   MCV 96.7 98.7* 98.1*   MCH 32.7 32.2 32.9   MCHC 33.8 32.6* 33.5*   RDW 57.3* 59.4* 59.7*   PLATELETCT 140* 101* 110*   MPV 11.8 11.6 11.4     Recent Labs     03/23/21  1043 03/23/21  2048 03/24/21  0856   SODIUM 128* 127* 134*   POTASSIUM 4.4 5.1 5.1   CHLORIDE 95* 97 102   CO2 26 23 25   GLUCOSE 183* 213* 136*   BUN 26* 27* 26*   CREATININE 0.98 1.15 0.86   CALCIUM 8.3* 8.7 8.4*     Recent Labs     03/24/21  0856   APTT 22.8*   INR 1.12               Imaging  CT-HEAD W/O   Final Result      Left extra-axial hematoma is again noted. Subacute blood closer to the vertex along the left frontoparietal lobes is mildly increased compared to prior measuring 1.2 compared to 1 cm.      Mass effect is again noted with 4.6 mm midline shift to the right, slightly increased compared to prior.      CT-ABDOMEN-PELVIS WITH   Final Result      1.  Ascites is no longer identified.      2.  Liver surface is mildly nodular consistent with cirrhosis.      3.  Splenomegaly is again noted. Punctate focus of decreased attenuation in the spleen could be due to normal differential enhancement although small hemangioma is also possible.      4.  Ventral abdominal wall hernia containing abdominal fat is again identified.      5.  Consolidation and volume loss is noted posteriorly in the right lower lobe which could indicate pneumonia.      6.  Diverticulosis.         DX-CHEST-PORTABLE (1 VIEW)   Final Result      Small right pleural effusion.      Right basilar opacities may  represent atelectasis or scarring.         CT-HEAD W/O   Final Result      1.  Stable size of the extra-axial hemorrhage overlying the left cerebral hemisphere with sulci effacement and approximately 3 mm of left-to-right midline shift.         IR-CAROTID-CEREBRAL BILATERAL   Final Result         1.  Normal cerebral arteriogram.      2.  No evidence of arteriovenous malformation or intracranial aneurysm formation.      3.  No evidence of atherosclerotic disease in the right common femoral artery.      CT-CTA HEAD WITH & W/O-POST PROCESS   Final Result      No evidence of occlusive lesion or aneurysm.      Multiple tortuous small vessels along the anterior-inferior aspect of the left temporal lobe could represent some type of vascular malformation.      No significant interval change in left-sided holohemispheric subdural hematoma extending along the falx and layering over the tentorium.      CT-CTA NECK WITH & W/O-POST PROCESSING   Final Result      CT angiogram of the neck within normal limits.      CT-HEAD W/O   Final Result         1. Grossly unchanged left hemispheric subdural hematoma, most in the left frontal and temporal region.   2. No new intracranial hemorrhage.                  CT-HEAD W/O   Final Result      1.  Stable large LEFT hemispheric subdural hematoma with associated mass effect and midline shift.   2.  No significant change from prior exam obtained 2 hours earlier.      CT-HEAD W/O   Final Result      Large LEFT holohemispheric subdural hematoma with associated mass effect and 4 mm LEFT to RIGHT midline shift.      These findings were discussed with EFREM FITZGERALD on 3/12/2021 11:40 AM.      DX-CHEST-PORTABLE (1 VIEW)   Final Result      1.  Hypoinflation, improved from prior.   2.  Minimal RIGHT lung base atelectasis or scar.   3.  Minimal RIGHT pleural effusion.           Assessment/Plan  * Subdural hematoma, acute (HCC)  Assessment & Plan  Continues to have headache  Neurosurgery following  and recommending continuing steroids and Keppra for seizure prophylaxis. On steroid taper  Repeat CT scan 3/22: 1.2cm extra axial hematoma (from 1cm) with 4.6mm midline shift to right slightly greater compared to prior imaging  Neurosurgery following  PT/OT/SLP following, continue therapy    3/23 plan for intervention Thursday, per NSG  - h/a, oxy prn  - no new deficits    History of prosthetic mitral valve  Assessment & Plan  With paroxysmal atrial fibrillation  Anticoagulation reversed given subdural hematoma  Resume anticoagulation when bleeding risk felt to be acceptable by neurosurgery    COPD (chronic obstructive pulmonary disease) (HCC)- (present on admission)  Assessment & Plan  Patient not in acute exacerbation or requiring oxygen  Continue to monitor    Gout- (present on admission)  Assessment & Plan  Continue allopurinol    Dehydration- (present on admission)  Assessment & Plan  Resolved    Type 2 diabetes mellitus without complication, without long-term current use of insulin (HCC)- (present on admission)  Assessment & Plan  Patient with hyperglycemia worsened by steroids  A1C 5.9  Due to poorly controlled Hyperglycemia increase lantus 15u QAM and continue Insulin sliding scale      Thrombocytopenia (HCC)  Assessment & Plan  Likely due to cirrhosis  Continue to monitor    Cognitive deficits  Assessment & Plan  Evaluated by SLP and found to have cognitive deficits, recommending SNF  Cognitive deficits likely secondary to SDH  Discussed with friend, he is not at previous baseline    3/23 Will likely need SNF placement, order placed  CM to assist  Accepted by HH  Noncompliance with pt/ot  Encourage activity    3/24 plan for OR 3/25  snf pending    Alcoholic cirrhosis of liver without ascites (HCC)  Assessment & Plan  Patient reports history of heavy alcohol use, has not drank in few years  Will need GI follow up as outpatient  Denies previous diagnosis of liver disease  Cirrhotic appearing liver on imaging  without ascites  INR elevation representing liver dysfunction  Will send hepatitis panel and ammonia level  Mild elevation of ALT    Generalized abdominal pain  Assessment & Plan  No definitive etiology of pain, does have fat containing ventral hernia and diverticulosis without diverticulitis  No evidence of ascites  Continue to monitor    Venous stasis dermatitis of right lower extremity  Assessment & Plan  Does not appear infected on examination  Continue skin care per nursing protocol    Grade IV hemorrhoids  Assessment & Plan  Patient reports chronic hemorrhoids with occasional mild rectal bleeding  Noted this morning to have prolapsed hemorrhoids, tender with mild bleeding  Will prescribe lidocaine gel for pain relief  Hemorrhoids have improved today      Leukemoid reaction  Assessment & Plan  Patient with acute rise in WBC  No evidence of infection on CXR or UA  WBC trending down  Likely secondary to dexamethasone  Hold Abx for now as not septic and without fever  If fever would consider broad spectrum Abx    Hyponatremia  Assessment & Plan  Asymptomatic hyponatremia  Hyponatremia worsening 3/22 now down to 126  3/23 stable at 129, cont current management  Likely SIADH  Free water restriction  Due to subdural hematoma will start salt supplementation      3/24 cont salt tabs  Monitor  mild    Valvular cardiomyopathy (HCC)- (present on admission)  Assessment & Plan  History of bioprosthetic mitral valve replacement    Pacemaker  Assessment & Plan  History of pacemaker limiting ability to obtain MRI    Obesity (BMI 30-39.9)- (present on admission)  Assessment & Plan  Will need weight loss program on discharge    Benign prostatic hyperplasia without lower urinary tract symptoms- (present on admission)  Assessment & Plan  Continue Flomax  No evidence of urinary obstruction       VTE prophylaxis: SCDs due to SDH

## 2021-03-24 NOTE — PROGRESS NOTES
Neurosurgery Progress Note    Subjective:  Pt in bed, c/o h/a, denies dble/blurry vision    Exam:  A/OX4, PERRLA, EOM intact  Face symmt, tongue midline  BIANCHI, no pronator drift        BP  Min: 91/59  Max: 110/80  Pulse  Av.7  Min: 77  Max: 81  Resp  Av.3  Min: 16  Max: 17  Temp  Av °C (96.8 °F)  Min: 35.8 °C (96.5 °F)  Max: 36.1 °C (97 °F)  SpO2  Av.7 %  Min: 96 %  Max: 97 %    No data recorded    Recent Labs     21  0239 21   WBC 10.8 9.5   RBC 3.92* 3.94*   HEMOGLOBIN 12.8* 12.7*   HEMATOCRIT 37.9* 38.9*   MCV 96.7 98.7*   MCH 32.7 32.2   MCHC 33.8 32.6*   RDW 57.3* 59.4*   PLATELETCT 140* 101*   MPV 11.8 11.6     Recent Labs     21  0220 21  1043 218   SODIUM 129* 128* 127*   POTASSIUM 4.7 4.4 5.1   CHLORIDE 100 95* 97   CO2 24 26 23   GLUCOSE 140* 183* 213*   BUN 29* 26* 27*   CREATININE 1.02 0.98 1.15   CALCIUM 8.3* 8.3* 8.7               Intake/Output       21 07 - 21 0659 21 - 21 0659       Total  Total       Intake    P.O.  100  240 340  --  -- --    P.O. 100 240 340 -- -- --    Total Intake 100 240 340 -- -- --       Output    Urine  650  150 800  --  -- --    Number of Times Voided 2 x -- 2 x -- -- --    Urine Void (mL) 650 150 800 -- -- --    Total Output 650 150 800 -- -- --       Net I/O     -550 90 -460 -- -- --            Intake/Output Summary (Last 24 hours) at 3/24/2021 0703  Last data filed at 3/23/2021 2300  Gross per 24 hour   Intake 340 ml   Output 800 ml   Net -460 ml            • NS  500 mL Continuous   • sodium chloride  1 g BID WITH MEALS   • insulin regular  2-9 Units 4X/DAY ACHS    And   • glucose  16 g Q15 MIN PRN    And   • dextrose 50%  50 mL Q15 MIN PRN   • insulin glargine  15 Units QAM INSULIN   • dexamethasone  3 mg Q8HRS   • acetaminophen  650 mg Q6HRS   • levETIRAcetam  500 mg BID   • lisinopril  2.5 mg Q DAY   • metoprolol tartrate  25 mg TWICE DAILY    • allopurinol  100 mg QAM   • tamsulosin  0.4 mg DAILY   • oxyCODONE immediate-release  5 mg Q4HRS PRN    Or   • oxyCODONE immediate-release  10 mg Q4HRS PRN   • morphine injection  2-4 mg Q3HRS PRN   • labetalol  10 mg Q4HRS PRN   • senna-docusate  2 tablet BID    And   • polyethylene glycol/lytes  1 Packet QDAY PRN    And   • magnesium hydroxide  30 mL QDAY PRN    And   • bisacodyl  10 mg QDAY PRN   • Respiratory Therapy Consult   Continuous RT   • LORazepam  2 mg Q5 MIN PRN   • hydrALAZINE  10 mg Q4HRS PRN   • enalaprilat  1.25 mg Q6HRS PRN   • prochlorperazine  10 mg Q6HRS PRN       Assessment and Plan:  Hospital day # 13  POD # planned 3/25  Subdural blood in a strange location along the frontal skull base and some left fronto-temporal. Mass effect is mild to moderate  Normal angiogram    NM as above - stable  PT/OT to assist with activity    Prophylactic anticoagulation:no due to sdh   Decadron 3mg Q8  Na- 127, on salt tabs, may need to increase  Continue Keppra   Surgery planned for tomorrow  Will recheck coags, consent ordered  NPO after mn

## 2021-03-24 NOTE — CARE PLAN
Problem: Safety  Goal: Will remain free from falls  Outcome: PROGRESSING AS EXPECTED     Problem: Knowledge Deficit  Goal: Knowledge of the prescribed therapeutic regimen will improve  Outcome: PROGRESSING SLOWER THAN EXPECTED  Goal: Knowledge of disease process/condition, treatment plan, diagnostic tests, and medications will improve  Outcome: PROGRESSING SLOWER THAN EXPECTED     Problem: Discharge Barriers/Planning  Goal: Patient's continuum of care needs will be met  Outcome: PROGRESSING SLOWER THAN EXPECTED     Problem: Mobility  Goal: Risk for activity intolerance will decrease  Outcome: PROGRESSING SLOWER THAN EXPECTED

## 2021-03-25 PROBLEM — E86.0 DEHYDRATION: Status: RESOLVED | Noted: 2019-10-22 | Resolved: 2021-01-01

## 2021-03-25 NOTE — ANESTHESIA PREPROCEDURE EVALUATION
Relevant Problems   PULMONARY   (+) COPD (chronic obstructive pulmonary disease) (HCC)   (+) COPD exacerbation (HCC)      CARDIAC   (+) Atrial fibrillation with RVR (HCC)   (+) Essential hypertension, benign   (+) Grade IV hemorrhoids   (+) PVC (premature ventricular contraction)   (+) Pacemaker   (+) Paroxysmal SVT (supraventricular tachycardia) (HCC)         (+) Alcoholic cirrhosis of liver without ascites (HCC)   (+) Right renal stone      ENDO   (+) Type 2 diabetes mellitus with complication, without long-term current use of insulin (HCC)   (+) Type 2 diabetes mellitus without complication, without long-term current use of insulin (HCC)      Other   (+) Chronic gout of multiple sites   (+) Osteomyelitis (HCC)       Physical Exam    Airway   Mallampati: II  TM distance: >3 FB  Neck ROM: full       Cardiovascular - normal exam  Rhythm: regular  Rate: normal  (-) murmur     Dental - normal exam      Very poor dentition  Facial Hair   Pulmonary - normal exam  Breath sounds clear to auscultation     Abdominal    Neurological - normal exam                 Anesthesia Plan    ASA 3   ASA physical status 3 criteria: implanted pacemaker and COPD    Plan - general       Airway plan will be ETT          Induction: intravenous    Postoperative Plan: Postoperative administration of opioids is intended.    Pertinent diagnostic labs and testing reviewed    Informed Consent:    Anesthetic plan and risks discussed with patient.    Use of blood products discussed with: patient whom consented to blood products.

## 2021-03-25 NOTE — ASSESSMENT & PLAN NOTE
Likely valvular Af s/p mitral valve replacement on warfarin  -hold for now given s/p craniotomy  For SDH  -will resume when it is appropriate as per neurology.

## 2021-03-25 NOTE — ANESTHESIA PROCEDURE NOTES
Arterial Line  Performed by: Hans Sutton M.D.  Authorized by: Hans Sutton M.D.     Start Time:  3/25/2021 7:20 AM  End Time:  3/25/2021 7:27 AM  Localization: ultrasound guidance and surface landmarks    Patient Location:  OR  Indication: continuous blood pressure monitoring        Catheter Size:  20 G  Seldinger Technique?: Yes    Laterality:  Right  Site:  Radial artery  Line Secured:  Antimicrobial disc, tape and transparent dressing  Events: patient tolerated procedure well with no complications

## 2021-03-25 NOTE — PROGRESS NOTES
[facility] Medicine Daily Progress Note    Date of Service  3/25/2021    Chief Complaint  65 y.o. male admitted 3/12/2021 after being found down    Hospital Course  65 y.o. male who presented 3/12/2021 with history of bioprosthetic mitral valve replacement atrial fibrillation on chronic anticoagulation with warfarin transferred to the emergency department after he was noted to be altered and having right upper extremity weakness and aphasia.  He was noted to have large left holohemispheric subdural hematoma.  He was admitted to the intensive care unit his coagulopathy was reversed with Kcentra and was evaluated by neurosurgery.  He has a history of pacemaker and was unable to have an MRI.  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    Patient was transferred out of ICU 3/16. Neurosurgery following patient and recommended steroids (dexamethasone 4mg every 6 hours) and Keppra 500mg Q12 hours for seizure prophylaxis.    Patient was evaluated by SLP and cognitive evaluation performed showing significant cognitive deficits, necessitating SNF. Discussed with patient's friend who reports that he is not back to baseline.    Patient underwent surveillance CT scan on 3/22 which showed slight enlargement to 1.2cm and slightly increased midline shift. Neurosurgery continued to follow case. Decadron decreased to 3mg every 8 hours.    Interval Problem Update    Pt not seen, in OR for SDH/craniotomy  No events overnight          Consultants/Specialty  Neurosurgery  Critical Care    Code Status  Full Code    Disposition  Likely SNF due to cognitive deficits  Surgery thursday    Review of Systems  Review of Systems   Constitutional: Negative for chills, fever and malaise/fatigue.   HENT: Negative.  Negative for hearing loss.    Eyes: Negative for blurred vision, double vision and pain.   Respiratory: Negative for cough and  hemoptysis.    Cardiovascular: Negative for chest pain, palpitations and orthopnea.   Gastrointestinal: Negative for heartburn and nausea.   Genitourinary: Negative for dysuria and urgency.   Musculoskeletal: Negative for back pain and myalgias.   Skin: Negative.  Negative for rash.   Neurological: Positive for headaches. Negative for dizziness, sensory change, speech change, focal weakness and weakness.   Psychiatric/Behavioral: Negative for depression, hallucinations and suicidal ideas.        Physical Exam  Temp:  [35.8 °C (96.5 °F)-36.8 °C (98.3 °F)] 35.8 °C (96.5 °F)  Pulse:  [66-84] 80  Resp:  [12-39] 15  BP: ()/(66-90) 134/89  SpO2:  [95 %-100 %] 100 %    Physical Exam  Constitutional:       General: He is not in acute distress.     Appearance: He is not ill-appearing or diaphoretic.   HENT:      Head: Normocephalic.      Nose: Nose normal.      Mouth/Throat:      Mouth: Mucous membranes are moist.      Pharynx: Oropharynx is clear.   Eyes:      Conjunctiva/sclera: Conjunctivae normal.      Pupils: Pupils are equal, round, and reactive to light.   Cardiovascular:      Rate and Rhythm: Normal rate and regular rhythm.   Pulmonary:      Effort: Pulmonary effort is normal. No respiratory distress.      Breath sounds: Normal breath sounds. No stridor. No rhonchi.   Abdominal:      General: Abdomen is flat. There is no distension.      Palpations: Abdomen is soft.   Genitourinary:     Comments: 3 Prolapsed hemorrhoids. Rectum clear of stool and without obstruction  Musculoskeletal:         General: No swelling or tenderness.      Cervical back: Normal range of motion.   Skin:     General: Skin is warm and dry.      Capillary Refill: Capillary refill takes less than 2 seconds.   Neurological:      General: No focal deficit present.      Mental Status: He is alert and oriented to person, place, and time.      Cranial Nerves: No cranial nerve deficit.      Sensory: No sensory deficit.      Motor: Weakness  present.      Comments: Mild confusion   Psychiatric:         Mood and Affect: Mood normal.         Behavior: Behavior normal.         Fluids    Intake/Output Summary (Last 24 hours) at 3/25/2021 1109  Last data filed at 3/25/2021 1000  Gross per 24 hour   Intake 1950 ml   Output 430 ml   Net 1520 ml       Laboratory  Recent Labs     03/23/21  0220 03/24/21  0856 03/25/21  0134   WBC 9.5 13.9* 12.7*   RBC 3.94* 4.17* 3.90*   HEMOGLOBIN 12.7* 13.7* 12.7*   HEMATOCRIT 38.9* 40.9* 38.3*   MCV 98.7* 98.1* 98.2*   MCH 32.2 32.9 32.6   MCHC 32.6* 33.5* 33.2*   RDW 59.4* 59.7* 59.7*   PLATELETCT 101* 110* 97*   MPV 11.6 11.4 10.9     Recent Labs     03/23/21 2048 03/24/21  0856 03/25/21  0134   SODIUM 127* 134* 132*   POTASSIUM 5.1 5.1 5.0   CHLORIDE 97 102 102   CO2 23 25 23   GLUCOSE 213* 136* 155*   BUN 27* 26* 26*   CREATININE 1.15 0.86 0.98   CALCIUM 8.7 8.4* 8.6     Recent Labs     03/24/21  0856   APTT 22.8*   INR 1.12               Imaging  CT-HEAD W/O   Final Result      Left extra-axial hematoma is again noted. Subacute blood closer to the vertex along the left frontoparietal lobes is mildly increased compared to prior measuring 1.2 compared to 1 cm.      Mass effect is again noted with 4.6 mm midline shift to the right, slightly increased compared to prior.      CT-ABDOMEN-PELVIS WITH   Final Result      1.  Ascites is no longer identified.      2.  Liver surface is mildly nodular consistent with cirrhosis.      3.  Splenomegaly is again noted. Punctate focus of decreased attenuation in the spleen could be due to normal differential enhancement although small hemangioma is also possible.      4.  Ventral abdominal wall hernia containing abdominal fat is again identified.      5.  Consolidation and volume loss is noted posteriorly in the right lower lobe which could indicate pneumonia.      6.  Diverticulosis.         DX-CHEST-PORTABLE (1 VIEW)   Final Result      Small right pleural effusion.      Right basilar  opacities may represent atelectasis or scarring.         CT-HEAD W/O   Final Result      1.  Stable size of the extra-axial hemorrhage overlying the left cerebral hemisphere with sulci effacement and approximately 3 mm of left-to-right midline shift.         IR-CAROTID-CEREBRAL BILATERAL   Final Result         1.  Normal cerebral arteriogram.      2.  No evidence of arteriovenous malformation or intracranial aneurysm formation.      3.  No evidence of atherosclerotic disease in the right common femoral artery.      CT-CTA HEAD WITH & W/O-POST PROCESS   Final Result      No evidence of occlusive lesion or aneurysm.      Multiple tortuous small vessels along the anterior-inferior aspect of the left temporal lobe could represent some type of vascular malformation.      No significant interval change in left-sided holohemispheric subdural hematoma extending along the falx and layering over the tentorium.      CT-CTA NECK WITH & W/O-POST PROCESSING   Final Result      CT angiogram of the neck within normal limits.      CT-HEAD W/O   Final Result         1. Grossly unchanged left hemispheric subdural hematoma, most in the left frontal and temporal region.   2. No new intracranial hemorrhage.                  CT-HEAD W/O   Final Result      1.  Stable large LEFT hemispheric subdural hematoma with associated mass effect and midline shift.   2.  No significant change from prior exam obtained 2 hours earlier.      CT-HEAD W/O   Final Result      Large LEFT holohemispheric subdural hematoma with associated mass effect and 4 mm LEFT to RIGHT midline shift.      These findings were discussed with EFREM FITZGERALD on 3/12/2021 11:40 AM.      DX-CHEST-PORTABLE (1 VIEW)   Final Result      1.  Hypoinflation, improved from prior.   2.  Minimal RIGHT lung base atelectasis or scar.   3.  Minimal RIGHT pleural effusion.           Assessment/Plan  * Subdural hematoma, acute (HCC)  Assessment & Plan  Continues to have  headache  Neurosurgery following and recommending continuing steroids and Keppra for seizure prophylaxis. On steroid taper  Repeat CT scan 3/22: 1.2cm extra axial hematoma (from 1cm) with 4.6mm midline shift to right slightly greater compared to prior imaging  Neurosurgery following  PT/OT/SLP following, continue therapy    3/23 plan for intervention Thursday, per NSG  - h/a, oxy prn  - no new deficits    History of prosthetic mitral valve  Assessment & Plan  With paroxysmal atrial fibrillation  Anticoagulation reversed given subdural hematoma  Resume anticoagulation when bleeding risk felt to be acceptable by neurosurgery    COPD (chronic obstructive pulmonary disease) (HCC)- (present on admission)  Assessment & Plan  Patient not in acute exacerbation or requiring oxygen  Continue to monitor    Gout- (present on admission)  Assessment & Plan  Continue allopurinol    Dehydration- (present on admission)  Assessment & Plan  Resolved    Type 2 diabetes mellitus without complication, without long-term current use of insulin (HCC)- (present on admission)  Assessment & Plan  Patient with hyperglycemia worsened by steroids  A1C 5.9  Due to poorly controlled Hyperglycemia increase lantus 15u QAM and continue Insulin sliding scale      Thrombocytopenia (HCC)  Assessment & Plan  Likely due to cirrhosis  Continue to monitor    Cognitive deficits  Assessment & Plan  Evaluated by SLP and found to have cognitive deficits, recommending SNF  Cognitive deficits likely secondary to SDH  Discussed with friend, he is not at previous baseline    3/23 Will likely need SNF placement, order placed  CM to assist  Accepted by HH  Noncompliance with pt/ot  Encourage activity    3/24 plan for OR 3/25  snf pending    Alcoholic cirrhosis of liver without ascites (HCC)  Assessment & Plan  Patient reports history of heavy alcohol use, has not drank in few years  Will need GI follow up as outpatient  Denies previous diagnosis of liver  disease  Cirrhotic appearing liver on imaging without ascites  INR elevation representing liver dysfunction  Will send hepatitis panel and ammonia level  Mild elevation of ALT    Generalized abdominal pain  Assessment & Plan  No definitive etiology of pain, does have fat containing ventral hernia and diverticulosis without diverticulitis  No evidence of ascites  Continue to monitor    Venous stasis dermatitis of right lower extremity  Assessment & Plan  Does not appear infected on examination  Continue skin care per nursing protocol    Grade IV hemorrhoids  Assessment & Plan  Patient reports chronic hemorrhoids with occasional mild rectal bleeding  Noted this morning to have prolapsed hemorrhoids, tender with mild bleeding  Will prescribe lidocaine gel for pain relief  Hemorrhoids have improved today      Leukemoid reaction  Assessment & Plan  Patient with acute rise in WBC  No evidence of infection on CXR or UA  WBC trending down  Likely secondary to dexamethasone  Hold Abx for now as not septic and without fever  If fever would consider broad spectrum Abx    Hyponatremia  Assessment & Plan  Asymptomatic hyponatremia  Hyponatremia worsening 3/22 now down to 126  3/23 stable at 129, cont current management  Likely SIADH  Free water restriction  Due to subdural hematoma will start salt supplementation      3/24 cont salt tabs  Monitor  mild    Valvular cardiomyopathy (HCC)- (present on admission)  Assessment & Plan  History of bioprosthetic mitral valve replacement    Pacemaker  Assessment & Plan  History of pacemaker limiting ability to obtain MRI    Obesity (BMI 30-39.9)- (present on admission)  Assessment & Plan  Will need weight loss program on discharge    Benign prostatic hyperplasia without lower urinary tract symptoms- (present on admission)  Assessment & Plan  Continue Flomax  No evidence of urinary obstruction       VTE prophylaxis: SCDs due to SDH

## 2021-03-25 NOTE — OR SURGEON
Immediate Post OP Note    PreOp Diagnosis: left subdural hematoma    PostOp Diagnosis: same    Procedure(s):  CRANIOTOMY - FOR SUBDURAL. - Wound Class: Clean    Surgeon(s):  Naveed Hudson M.D.    Anesthesiologist/Type of Anesthesia:  Anesthesiologist: Hans Sutton M.D./General    Surgical Staff:  Assistant: KRYSTINA Traylor  Circulator: Alicia Kelly R.N.  Scrub Person: Neetu Marino    Specimens removed if any:  * No specimens in log *    Estimated Blood Loss: 100    Findings: subacute subdural hematoma under pressure    Complications: none        3/25/2021 8:58 AM Naveed Hudson M.D.

## 2021-03-25 NOTE — OP REPORT
DATE OF SERVICE:  03/25/2021     PREOPERATIVE DIAGNOSIS:  Left subdural hematoma, subacute.     POSTOPERATIVE DIAGNOSIS:  Left subdural hematoma, subacute.     PROCEDURE:  Left craniotomy for evacuation of subdural hematoma.     SURGEON:  Naveed Hudson MD     ASSISTANT:  VINCENZO Gonzales     ANESTHESIA:  General anesthetic.     ANESTHESIOLOGIST:  Hans Sutton MD     PREPARATION:  Routine.     INDICATION:  Status post closed head injury with a progressively enlarging   subacute subdural hematoma, headache, and inability to be discharged secondary   to symptoms.  Indications and possible complications of this procedure were   explained to the patient including infection, hemorrhage, and stroke.    Informed consent was obtained.     DESCRIPTION OF PROCEDURE:  The patient was brought to the operating room and   following induction, he was intubated and placed under general anesthetic.    Rolled onto his right side with his left side up, roll was placed underneath   the left shoulder blade and left buttock.  His head was placed in a padded   horseshoe.  The head was shaved, prepped and draped in sterile fashion.  We   made a curvilinear incision from the temporal region up to the frontal region.    This was carried down through the temporalis muscle at its superior margin.    Obey clips were used to maintain hemostasis.  We placed self-retaining   retractors, opened the incision, and placed a superior and inferior oj hole.    The dura was dissected free and craniotomy was turned.  Dura was opened and   reflected inferiorly and we immediately got a subacute subdural hematoma that   squirted out about a foot.  We irrigated until clear.  There was a small   bleeder also that was found initially, which was coagulated on the surface of   the brain.  This had contact with arachnoid scarring, and there was some   hemosiderin deposit around it.  There was no evidence of arteriovenous   malformation.  We  irrigated until clear.  We dissected off the membrane from   the dural flap, coagulated it and then sutured the dura back into place with a   4-0 Nurolon suture.  We used Medtronic dural replacement, in an elliptical   fashion, to further close the dura followed by Dura-Guard.     Dural tackers were placed circumferentially, followed by the bone flap secured   with 2 oj hole covers and two squares and 5 mm screws.  The temporalis   muscle was closed with 2-0 Vicryl suture.  Medium size Hemovac was placed.    Galea was closed with 2-0 Vicryl suture and skin with staples.  All sponge and   needle counts were correct and estimated blood loss for the procedure itself   was less than 60 mL.        ______________________________  MD AMADOU XIE/JULIANA    DD:  03/25/2021 08:54  DT:  03/25/2021 09:44    Job#:  [id number]    CC:DANIAL Terrazas MD

## 2021-03-25 NOTE — PROGRESS NOTES
Pt arrived to ICU room 115 by PACU. Handoff report received. Pt transferred to ICU bed and attached to monitors. C/O nausea provided medication per PRN MAR. Call light provided and education given.

## 2021-03-25 NOTE — ANESTHESIA PROCEDURE NOTES
Airway    Date/Time: 3/25/2021 7:10 AM  Performed by: Hans Sutton M.D.  Authorized by: Hans Sutton M.D.     Location:  OR  Urgency:  Elective  Difficult Airway: No    Indications for Airway Management:  Anesthesia      Spontaneous Ventilation: absent    Sedation Level:  Deep  Preoxygenated: Yes    Patient Position:  Sniffing  Mask Difficulty Assessment:  1 - vent by mask  Final Airway Type:  Endotracheal airway  Final Endotracheal Airway:  ETT  Cuffed: Yes    Technique Used for Successful ETT Placement:  Direct laryngoscopy    Insertion Site:  Oral  Blade Type:  Michael  Laryngoscope Blade/Videolaryngoscope Blade Size:  4  ETT Size (mm):  8.0  Measured from:  Lips  ETT to Lips (cm):  20  Placement Verified by: auscultation and capnometry    Cormack-Lehane Classification:  Grade IIa - partial view of glottis  Number of Attempts at Approach:  1  Number of Other Approaches Attempted:  0

## 2021-03-25 NOTE — ANESTHESIA POSTPROCEDURE EVALUATION
Patient: Morales Olivier    Procedure Summary     Date: 03/25/21 Room / Location: [place] [id number] / SURGERY [facility]    Anesthesia Start: 0703 Anesthesia Stop: 0855    Procedure: CRANIOTOMY - FOR SUBDURAL. (Left Head) Diagnosis: (subdural hematoma)    Surgeons: Naveed Hudson M.D. Responsible Provider: Hans Sutton M.D.    Anesthesia Type: general ASA Status: 3          Final Anesthesia Type: general  Last vitals  BP   Blood Pressure : 131/90    Temp   35.8 °C (96.5 °F)    Pulse   80   Resp   17    SpO2   100 %      Anesthesia Post Evaluation    Patient location during evaluation: PACU  Patient participation: complete - patient participated  Level of consciousness: awake and alert  Pain score: 2    Airway patency: patent  Anesthetic complications: no  Cardiovascular status: hemodynamically stable  Respiratory status: acceptable  Hydration status: euvolemic    PONV: none          There were no known complications for this encounter.     Nurse Pain Score: 7 (NPRS)

## 2021-03-25 NOTE — THERAPY
03/25/21 0922   Other Treatments   Other Treatments Provided Pt in surgery for Crani.  Pt will resume when medically cleared to participate.

## 2021-03-25 NOTE — OR NURSING
Pt transported to floor with oxygen The oxygen tank is greater than 50% full upon inspection.  Floor orders were reviewed with receiving RN.

## 2021-03-25 NOTE — OR NURSING
The pt is awake and oriented. Respirations are regular and easy. Pain is controlled, pain level 6/10 and tolerable.Resting with eyes closed.. Dressing dry and intact.No neuro changes.

## 2021-03-25 NOTE — CARE PLAN
Problem: Bowel/Gastric:  Goal: Normal bowel function is maintained or improved  Outcome: PROGRESSING AS EXPECTED     Problem: Knowledge Deficit  Goal: Knowledge of the prescribed therapeutic regimen will improve  Outcome: PROGRESSING AS EXPECTED  Goal: Knowledge of disease process/condition, treatment plan, diagnostic tests, and medications will improve  Outcome: PROGRESSING AS EXPECTED

## 2021-03-25 NOTE — ANESTHESIA TIME REPORT
Anesthesia Start and Stop Event Times     Date Time Event    3/25/2021 0700 Ready for Procedure     0703 Anesthesia Start     0855 Anesthesia Stop        Responsible Staff  03/25/21    Name Role Begin End    Hans Sutton M.D. Anesth 0703 0855        Preop Diagnosis (Free Text):  Pre-op Diagnosis     subdural hematoma        Preop Diagnosis (Codes):    Post op Diagnosis  Subdural hematoma (HCC)      Premium Reason  Non-Premium    Comments:

## 2021-03-25 NOTE — CONSULTS
Critical Care Consultation    Date of Service: 3/25/2021    Date of Admission:  3/12/2021  9:35 AM    Consulting Physician: Yojana Delatorre D.O.    Chief Complaint:  ALOC, Possible Stroke, and Headache      History of Present Illness: Morales Olivier is a 65 y.o. male with a past medical history of hypertension, type 2 diabetes, atrial fibrillation on warfarin-?  Valvular A. fib s/p bioprosthetic mitral valve replacement, pacemaker in situ admitted to [facility] on 03/12/2021 with complaints of altered mental status, found on the floor by friend.  On admission, patient was found to have right upper extremity weakness and aphasia, imaging revealed left subdural hematoma, patient was transferred to floor, surveillance CT on 03/22 revealed slight enlargement of SDH with some midline shift, neurosurgery was following since day 1 of admission, planned surgery today 03/25/2021 and after surgery patient was transferred to ICU for close monitoring and higher level of care.    Review of Systems   Constitutional: Positive for malaise/fatigue. Negative for chills, fever and weight loss.   HENT: Negative for ear pain, hearing loss and tinnitus.    Eyes: Negative for blurred vision, double vision and photophobia.   Respiratory: Negative for cough, hemoptysis and sputum production.    Cardiovascular: Negative for chest pain, palpitations and orthopnea.   Gastrointestinal: Positive for nausea. Negative for abdominal pain, heartburn and vomiting.   Genitourinary: Negative for dysuria and urgency.   Musculoskeletal: Negative for myalgias and neck pain.   Skin: Negative for rash.   Neurological: Negative for dizziness, tingling and headaches.   Endo/Heme/Allergies: Negative for environmental allergies. Does not bruise/bleed easily.   Psychiatric/Behavioral: Negative for depression, substance abuse and suicidal ideas.       Home Medications     Reviewed by Richie Mae (Pharmacy Tech) on 03/12/21 at 1057  Med List Status: Complete    Medication Last Dose Status   allopurinol (ZYLOPRIM) 100 MG Tab UNK Active   benzonatate (TESSALON) 100 MG Cap NOT TAKING Active   cetirizine (ZYRTEC) 10 MG Tab UNK Active   hydrOXYzine HCl (ATARAX) 25 MG Tab UNK Active   lisinopril (PRINIVIL) 2.5 MG Tab UNK Active   metFORMIN (GLUCOPHAGE) 500 MG Tab UNK Active   metoprolol SR (TOPROL XL) 50 MG TABLET SR 24 HR UNK Active   multivitamin (THERAGRAN) Tab UNK Active   tamsulosin (FLOMAX) 0.4 MG capsule UNK Active   warfarin (COUMADIN) 3 MG Tab UNK Active                Social History     Tobacco Use   • Smoking status: Former Smoker     Packs/day: 2.50     Years: 9.00     Pack years: 22.50     Types: Cigarettes     Quit date: 1981     Years since quittin.2   • Smokeless tobacco: Never Used   Substance Use Topics   • Alcohol use: No   • Drug use: Yes     Types: Marijuana        Past Medical History:   Diagnosis Date   • Atrial fibrillation (HCC)    • Back pain    • Bronchitis    • CAD (coronary artery disease)    • COPD (chronic obstructive pulmonary disease) (HCC)    • Gout    • Heart valve disease     mitral valve replacement (bovine)   • Hypertension    • Kidney stone    • Obesity    • Open wound 2009   • Pacemaker    • Personal history of venous thrombosis and embolism     DVT right leg   • PVC (premature ventricular contraction) 2019   • Renal disorder     kidney stones   • Type II or unspecified type diabetes mellitus without mention of complication, not stated as uncontrolled     DM type II- diet controlled       Past Surgical History:   Procedure Laterality Date   • CRANIOTOMY Left 3/25/2021    Procedure: CRANIOTOMY - FOR SUBDURAL.;  Surgeon: Naveed Hudson M.D.;  Location: [facility];  Service: Neurosurgery   • MITRAL VALVE REPLACE  3/8/2017    Procedure: MITRAL VALVE REPLACE-REDO;  Surgeon: Cornelia Wright M.D.;  Location: [facility];  Service:    • KD  3/8/2017    Procedure: KD;  Surgeon: Cornelia Wright M.D.;   Location: [facility];  Service:    • IRRIGATION & DEBRIDEMENT GENERAL  7/20/2009    Performed by MICHEL URBINA at SURGERY [facility] ORS   • WIDE EXCISION  7/20/2009    Performed by MICHEL URBINA at SURGERY [facility] ORS   • ANGIOGRAM  7/4/2009    Performed by MICHEL URBINA at SURGERY [facility] ORS   • CATH REMOVAL  7/4/2009    Performed by MICHEL URBINA at SURGERY [facility] ORS   • THROMBECTOMY  7/4/2009    Performed by MICHEL URBINA at [facility] ORS   • EMBOLECTOMY  7/4/2009    Performed by MICHEL URBINA at SURGERY [facility] ORS   • ANGIOGRAM  7/3/2009    Performed by MORGAN WARD at SURGERY [facility] ORS   • MITRAL VALVE REPLACE  3/14/08    Performed by JEANA MARTELL at [facility] ORS   • MAZE PROCEDURE  3/14/08    Performed by JEANA MARTELL at [facility]   • OTHER CARDIAC SURGERY  2008    mitral valve replacement   • AORTIC VALVE REPLACEMENT     • OTHER ABDOMINAL SURGERY      imbulica repair        Allergies: Patient has no known allergies.    Family History   Problem Relation Age of Onset   • Diabetes Mother    • Lung Disease Father    • Heart Disease Neg Hx        Vitals:    03/12/21 1505 03/12/21 1510 03/12/21 1515 03/12/21 1520   Height:       Weight:       Weight % change since last entry.:       BP: 137/80 136/81 135/84 135/79   Pulse: 80 80 80 80   BMI (Calculated):       Resp:       Temp:       TempSrc:        03/12/21 1535 03/12/21 1550 03/12/21 1600 03/12/21 1700   Height:       Weight: 96 kg (211 lb 10.3 oz)      Weight % change since last entry.: -3.8 %      BP: 131/69 119/69 116/70 140/88   Pulse: 82 80 80 80   BMI (Calculated):       Resp: (!) 23 (!) 22 (!) 21 (!) 24   Temp: 36.6 °C (97.9 °F)  36.7 °C (98 °F)    TempSrc: Temporal  Temporal     03/12/21 1800 03/12/21 1900 03/12/21 2000 03/12/21 2100   Height:       Weight:       Weight % change since last entry.:       BP: 137/81 137/83 140/79 140/83   Pulse:  80 80 80 80   BMI (Calculated):       Resp: (!) 23 (!) 24 (!) 21 (!) 32   Temp: 36.9 °C (98.5 °F)  36.9 °C (98.4 °F)    TempSrc: Temporal  Temporal     03/12/21 2200 03/12/21 2300 03/13/21 0000 03/13/21 0100   Height:       Weight:       Weight % change since last entry.:       BP: 141/84 124/80 127/81 139/84   Pulse: 80 80 80 80   BMI (Calculated):       Resp: (!) 26 (!) 41 (!) 63 (!) 21   Temp: 37 °C (98.6 °F)  36.8 °C (98.2 °F)    TempSrc: Temporal  Temporal     03/13/21 0200 03/13/21 0300 03/13/21 0400 03/13/21 0500   Height:       Weight:       Weight % change since last entry.:       BP: 127/73 131/78 142/87 137/83   Pulse: 80 80 80 80   BMI (Calculated):       Resp: (!) 23 20 20 16   Temp: 36.5 °C (97.7 °F)  36.7 °C (98 °F)    TempSrc: Temporal  Temporal     03/13/21 0600 03/13/21 0700 03/13/21 0800 03/13/21 0900   Height:       Weight:    101 kg (223 lb 8.7 oz)   Weight % change since last entry.:    5.62 %   BP: 131/85 140/72 140/86 133/82   Pulse: 80 80 80 80   BMI (Calculated):       Resp: 20 (!) 28 (!) 43 (!) 31   Temp: 36.6 °C (97.8 °F)  36.5 °C (97.7 °F)    TempSrc: Temporal  Temporal     03/13/21 1000 03/13/21 1100 03/13/21 1200 03/13/21 1300   Height:       Weight:       Weight % change since last entry.:       BP: 144/84 145/88  155/90   Pulse: 80 82 79 80   BMI (Calculated):       Resp: (!) 25 (!) 37 (!) 37 (!) 27   Temp: 36.5 °C (97.7 °F)  36.6 °C (97.9 °F)    TempSrc: Temporal       03/13/21 1400 03/13/21 1500 03/13/21 1600 03/13/21 1700   Height:       Weight:       Weight % change since last entry.:       BP: 146/94 137/84  145/92   Pulse: 80 80 80 83   BMI (Calculated):       Resp: (!) 66 (!) 22 (!) 31 (!) 61   Temp: 36.5 °C (97.7 °F)  36.6 °C (97.9 °F)    TempSrc: Temporal  Temporal     03/13/21 1800 03/13/21 1900 03/13/21 2000 03/13/21 2100   Height:       Weight:       Weight % change since last entry.:       BP: 131/74 109/79 118/79 111/72   Pulse: 80 82 80 80   BMI (Calculated):        Resp: (!) 21 20 20 20   Temp: 36.5 °C (97.7 °F)  36.8 °C (98.3 °F)    TempSrc: Temporal  Temporal     03/13/21 2200 03/13/21 2300 03/14/21 0000 03/14/21 0100   Height:       Weight:       Weight % change since last entry.:       BP: 127/78 126/83 131/85 143/82   Pulse: 80 80 84 80   BMI (Calculated):       Resp: 19 16 (!) 26 20   Temp: 36.2 °C (97.1 °F)  36.3 °C (97.3 °F)    TempSrc: Temporal  Temporal     03/14/21 0159 03/14/21 0300 03/14/21 0400 03/14/21 0500   Height:       Weight:       Weight % change since last entry.:       BP: 125/79  133/85 135/87   Pulse: 80 80 80 80   BMI (Calculated):       Resp: 17 17 16 15   Temp: 36.2 °C (97.1 °F)  36.4 °C (97.6 °F)    TempSrc: Temporal  Temporal     03/14/21 0600 03/14/21 0700 03/14/21 0750 03/14/21 0800   Height:       Weight: 98.1 kg (216 lb 4.3 oz)  98.1 kg (216 lb 4.3 oz)    Weight % change since last entry.: -3.25 %  0 %    BP: 128/83 126/77  128/87   Pulse: 80 80  80   BMI (Calculated):       Resp: 20 (!) 37  (!) 39   Temp: 36.2 °C (97.1 °F)   36.3 °C (97.3 °F)   TempSrc: Temporal   Temporal    03/14/21 0900 03/14/21 1000 03/14/21 1100 03/14/21 1200   Height:       Weight:       Weight % change since last entry.:       BP: 126/84 122/76 127/79 117/77   Pulse: 80 80 80 80   BMI (Calculated):       Resp: 16 16 17 19   Temp:  36.3 °C (97.3 °F)  36.3 °C (97.3 °F)   TempSrc:  Temporal  Temporal    03/14/21 1300 03/14/21 1400 03/14/21 1500 03/14/21 1600   Height:       Weight:       Weight % change since last entry.:       BP: 122/85 124/83 133/78 115/71   Pulse: 80 80 80 80   BMI (Calculated):       Resp: (!) 46 (!) 42 (!) 21 (!) 62   Temp:  36.2 °C (97.2 °F)  36.6 °C (97.8 °F)   TempSrc:  Temporal  Temporal    03/14/21 1700 03/14/21 1800 03/14/21 1900 03/14/21 2000   Height:       Weight:       Weight % change since last entry.:       BP: 123/83 137/90 143/86 122/75   Pulse: 87 81 80 80   BMI (Calculated):       Resp: (!) 38 (!) 51 (!) 29 20   Temp:  36.4 °C  (97.5 °F)  36.4 °C (97.6 °F)   TempSrc:  Temporal  Temporal    03/14/21 2100 03/14/21 2200 03/14/21 2300 03/15/21 0000   Height:       Weight:       Weight % change since last entry.:       BP: 144/83 141/88 142/88 131/94   Pulse: 84 80 80 80   BMI (Calculated):       Resp: (!) 24 (!) 23 (!) 24 (!) 26   Temp:  36.4 °C (97.6 °F)  36 °C (96.8 °F)   TempSrc:  Temporal  Temporal    03/15/21 0100 03/15/21 0200 03/15/21 0300 03/15/21 0400   Height:       Weight:       Weight % change since last entry.:       BP: 145/95 142/89 141/90 139/93   Pulse: 80 80 80 80   BMI (Calculated):       Resp: 19 16 18 16   Temp:  36 °C (96.8 °F)  36.3 °C (97.4 °F)   TempSrc:  Temporal  Temporal    03/15/21 0500 03/15/21 0600 03/15/21 0700 03/15/21 0800   Height:       Weight:       Weight % change since last entry.:       BP: 136/94 151/90 126/81 138/86   Pulse: 80 80 80 80   BMI (Calculated):       Resp: 20 18 20 20   Temp:  36 °C (96.8 °F)  36.1 °C (97 °F)   TempSrc:  Temporal  Temporal    03/15/21 0900 03/15/21 1000 03/15/21 1100 03/15/21 1200   Height:       Weight:       Weight % change since last entry.:       BP: 145/90 139/92 149/91 122/80   Pulse: 80 80 80 (!) 59   BMI (Calculated):       Resp: 17 15 (!) 22 20   Temp:  36.6 °C (97.8 °F)  36.2 °C (97.1 °F)   TempSrc:  Temporal  Temporal    03/15/21 1300 03/15/21 1323 03/15/21 1331 03/15/21 1336   Height:       Weight:       Weight % change since last entry.:       BP: 120/85 142/92 143/95 138/93   Pulse: 81 80 80 80   BMI (Calculated):       Resp: 20 19 17 20   Temp:       TempSrc:        03/15/21 1341 03/15/21 1346 03/15/21 1351 03/15/21 1356   Height:       Weight:       Weight % change since last entry.:       BP: 140/97 140/95 138/96 141/100   Pulse: 80 80 80 80   BMI (Calculated):       Resp: (!) 21 20 (!) 21 20   Temp:       TempSrc:        03/15/21 1400 03/15/21 1401 03/15/21 1406 03/15/21 1411   Height:       Weight:       Weight % change since last entry.:       BP: 132/90  142/92 142/93 145/95   Pulse: 80 80 80 80   BMI (Calculated):       Resp: (!) 36 19 18 (!) 21   Temp:       TempSrc:        03/15/21 1416 03/15/21 1417 03/15/21 1430 03/15/21 1500   Height:       Weight:       Weight % change since last entry.:       BP: 152/101 146/92 127/86 138/97   Pulse: 80 80 81 80   BMI (Calculated):       Resp: 20 (!) 21 20 20   Temp:   36.6 °C (97.8 °F)    TempSrc:   Temporal     03/15/21 1515 03/15/21 1530 03/15/21 1600 03/15/21 1630   Height:       Weight:       Weight % change since last entry.:       BP: 134/93 137/92 149/96 137/87   Pulse: 80 80 80 80   BMI (Calculated):       Resp: 20 20 18 20   Temp:   36.5 °C (97.7 °F) 36.5 °C (97.7 °F)   TempSrc:   Temporal Temporal    03/15/21 1700 03/15/21 1800 03/15/21 1900 03/15/21 2000   Height:       Weight:       Weight % change since last entry.:       BP: 158/96 125/76 109/69 120/73   Pulse: 80 81 80 80   BMI (Calculated):       Resp: (!) 21 18 (!) 24 (!) 24   Temp:  36.1 °C (97 °F)  36.4 °C (97.5 °F)   TempSrc:  Temporal  Temporal    03/15/21 2100 03/15/21 2200 03/15/21 2300 03/16/21 0000   Height:       Weight:       Weight % change since last entry.:       BP: 115/74 107/69 125/78 119/75   Pulse: 80 80 80 80   BMI (Calculated):       Resp: (!) 24 (!) 28 17 20   Temp:  36.7 °C (98 °F)  36 °C (96.8 °F)   TempSrc:  Temporal  Temporal    03/16/21 0100 03/16/21 0200 03/16/21 0300 03/16/21 0400   Height:       Weight:       Weight % change since last entry.:       BP: 138/86 132/85 135/86 138/89   Pulse: 80 80 80 80   BMI (Calculated):       Resp: (!) 24 19 (!) 21 20   Temp:  36.1 °C (96.9 °F)  36.1 °C (97 °F)   TempSrc:  Temporal  Temporal    03/16/21 0500 03/16/21 0600 03/16/21 0800 03/16/21 0900   Height:       Weight:       Weight % change since last entry.:       BP: 129/91 118/68 154/95 134/89   Pulse: 80 80 81 80   BMI (Calculated):       Resp: 20 20 20 20   Temp:  36.2 °C (97.1 °F) 36.2 °C (97.2 °F)    TempSrc:  Temporal Temporal      "03/16/21 1000 03/16/21 1010 03/16/21 1100 03/16/21 1200   Height: 1.778 m (5' 10\")      Weight: 97.1 kg (214 lb 1.1 oz)      Weight % change since last entry.: -1.02 %      BP: 136/94 114/82  131/83   Pulse: 80 81 80 80   BMI (Calculated): 30.72      Resp: (!) 21 (!) 31 (!) 23 (!) 22   Temp: 36.2 °C (97.1 °F)      TempSrc: Temporal       03/16/21 1300 03/16/21 1400 03/16/21 1500 03/16/21 1600   Height:       Weight:       Weight % change since last entry.:       BP: 133/80 124/82 119/80 123/79   Pulse: 80 80 81 80   BMI (Calculated):       Resp: (!) 34 16 20 20   Temp:       TempSrc:        03/16/21 1700 03/16/21 1800 03/16/21 1900 03/16/21 2000   Height:       Weight:       Weight % change since last entry.:       BP: 116/78 106/70 110/73 110/68   Pulse: 80 80 66 80   BMI (Calculated):       Resp: (!) 30 (!) 29 (!) 23 (!) 21   Temp:    36.2 °C (97.2 °F)   TempSrc:    Temporal    03/16/21 2030 03/16/21 2352 03/17/21 0400 03/17/21 0713   Height:       Weight:       Weight % change since last entry.:       BP: 129/82 117/68 117/73 132/92   Pulse: 78 69 92 80   BMI (Calculated):       Resp: 18 18 18 16   Temp: 36.1 °C (97 °F) 36.3 °C (97.4 °F) 36.9 °C (98.5 °F) 36.4 °C (97.6 °F)   TempSrc: Temporal Temporal Temporal Temporal    03/17/21 1124 03/17/21 1608 03/17/21 2000 03/17/21 2254   Height:       Weight:       Weight % change since last entry.:       BP: (!) 95/62 113/76 107/70 102/65   Pulse: 81 80 83 80   BMI (Calculated):       Resp: 16 16 18 16   Temp: 36.3 °C (97.3 °F) 36.5 °C (97.7 °F) 36 °C (96.8 °F) 36.3 °C (97.3 °F)   TempSrc: Temporal Temporal Temporal Temporal    03/18/21 0326 03/18/21 0533 03/18/21 0534 03/18/21 0806   Height:       Weight:       Weight % change since last entry.:       BP: 110/75 110/75  113/72   Pulse: 81  81 82   BMI (Calculated):       Resp: 16   16   Temp: 35.9 °C (96.7 °F)   36.1 °C (97 °F)   TempSrc: Temporal   Temporal    03/18/21 1648 03/18/21 2000 03/18/21 2100 03/19/21 0400 "   Height:       Weight:       Weight % change since last entry.:       BP: 117/76 (!) 90/56 104/67 117/84   Pulse: 81 80  80   BMI (Calculated):       Resp: 16 16  16   Temp: 36.4 °C (97.6 °F) 36.3 °C (97.3 °F)  35.8 °C (96.5 °F)   TempSrc: Temporal Temporal  Temporal    03/19/21 0657 03/19/21 0700 03/19/21 1638 03/19/21 2000   Height:       Weight:       Weight % change since last entry.:       BP:  122/88 (!) 94/64 100/60   Pulse:  79 83 80   BMI (Calculated):       Resp:  16 16 16   Temp: 36.9 °C (98.4 °F) 36.9 °C (98.4 °F) 36.1 °C (97 °F) 35.9 °C (96.6 °F)   TempSrc:  Temporal Temporal Temporal    03/20/21 0400 03/20/21 0739 03/20/21 1645 03/20/21 1700   Height:       Weight:    95.6 kg (210 lb 12.2 oz)   Weight % change since last entry.:    -1.54 %   BP: (!) 95/57 117/80 123/85    Pulse: 78 81 78    BMI (Calculated):       Resp: 16 16 16    Temp: 36.1 °C (96.9 °F) 36.1 °C (97 °F) 36.1 °C (97 °F)    TempSrc: Temporal Temporal Temporal     03/20/21 1915 03/21/21 0430 03/21/21 0728 03/21/21 1715   Height:       Weight:       Weight % change since last entry.:       BP: (!) 99/77 113/85 109/76 104/71   Pulse: 66 64 76 81   BMI (Calculated):       Resp: 14 16 16 16   Temp: 36.3 °C (97.4 °F) 36.3 °C (97.3 °F) 36.2 °C (97.2 °F) 35.9 °C (96.7 °F)   TempSrc: Temporal Temporal Temporal Temporal    03/21/21 1930 03/22/21 0338 03/22/21 0506 03/22/21 0800   Height:       Weight:    92.5 kg (203 lb 14.8 oz)   Weight % change since last entry.:    -3.24 %   BP: 102/61 113/77 113/70 105/73   Pulse: 80 80 82 82   BMI (Calculated):       Resp: 15 16  17   Temp: 36.1 °C (96.9 °F) 36.2 °C (97.2 °F)  36.7 °C (98.1 °F)   TempSrc: Temporal Temporal  Temporal    03/22/21 1600 03/22/21 1700 03/22/21 1924 03/23/21 0329   Height:       Weight:       Weight % change since last entry.:       BP: (!) 93/65 106/75 106/71 114/72   Pulse: 78 80 79 80   BMI (Calculated):       Resp: 16  16 16   Temp: 36.3 °C (97.4 °F)  36 °C (96.8 °F) 36.5 °C  (97.7 °F)   TempSrc: Temporal  Temporal Temporal    03/23/21 0719 03/23/21 1126 03/23/21 1600 03/23/21 2000   Height:       Weight:  94.8 kg (208 lb 15.9 oz)     Weight % change since last entry.:  2.49 %     BP: 106/72  (!) 97/68 (!) 91/59   Pulse: 80  81 77   BMI (Calculated):       Resp: 17 17 16   Temp: 36.1 °C (97 °F)  36.1 °C (97 °F) 35.8 °C (96.5 °F)   TempSrc: Temporal  Temporal Temporal    03/24/21 0318 03/24/21 0729 03/24/21 1600 03/24/21 1905   Height:       Weight:       Weight % change since last entry.:       BP: 110/80 103/79 (!) 94/68 111/66   Pulse: 78 81 80 83   BMI (Calculated):       Resp: 16 17 17 16   Temp: 36 °C (96.8 °F) 36.2 °C (97.2 °F) 36.8 °C (98.3 °F) 35.9 °C (96.6 °F)   TempSrc: Temporal Temporal Temporal Temporal    03/25/21 0309 03/25/21 0635 03/25/21 0853 03/25/21 0854   Height:       Weight:       Weight % change since last entry.:       BP: 102/66 110/80  131/90   Pulse: 66 80 80 80   BMI (Calculated):       Resp: 16 16 (!) 39 17   Temp: 36.2 °C (97.2 °F) 35.8 °C (96.5 °F)     TempSrc: Temporal Temporal      03/25/21 0900 03/25/21 0910 03/25/21 0920 03/25/21 0930   Height:       Weight:       Weight % change since last entry.:       BP:  137/88 135/85 121/83   Pulse: 80 84 80 80   BMI (Calculated):       Resp: 18 13 12 19   Temp:       TempSrc:        03/25/21 0940 03/25/21 0950 03/25/21 1000 03/25/21 1010   Height:       Weight:       Weight % change since last entry.:       BP: 121/83 122/85  134/89   Pulse: 80 80 80 80   BMI (Calculated):       Resp: 12 13 12 15   Temp:       TempSrc:       180 readings loaded. More data may exist, but no more data can be displayed here.       Physical Examination  General: Alert and oriented, elderly male, no acute distress  Neuro/Psych: Alert and oriented x3, no sensory deficits appreciated, mild left-sided facial droop, no vision changes, no sensory deficits, strength: 5/5 in left upper and lower extremities, 4/5 in both right upper and  lower extremities.  HEENT: NC/AT, PERRLA, EOM-WNL  CVS: S1, S2 present, could not appreciate any murmurs, ventricular paced rhythm on monitor.  Respiratory: Bilateral air entry equal, could not appreciate any wheeze or crackles.  Abdomen/: Soft, nontender, nondistended  Extremities: Peripheral pulses palpable, could not appreciate any pedal edema.      Intake/Output Summary (Last 24 hours) at 3/25/2021 1055  Last data filed at 3/25/2021 1000  Gross per 24 hour   Intake 1950 ml   Output 430 ml   Net 1520 ml       Recent Labs     03/23/21 0220 03/24/21  0856 03/25/21  0134   WBC 9.5 13.9* 12.7*   NEUTSPOLYS 86.70* 91.60* 91.20*   LYMPHOCYTES 4.60* 3.20* 3.30*   MONOCYTES 6.30 4.00 4.10   EOSINOPHILS 0.00 0.00 0.00   BASOPHILS 0.20 0.10 0.20   ASTSGOT  --   --  22   ALTSGPT  --   --  67*   ALKPHOSPHAT  --   --  82   TBILIRUBIN  --   --  0.5     Recent Labs     03/23/21  0220 03/23/21  1043 03/23/21 2048 03/24/21  0856 03/25/21  0134   SODIUM 129*   < > 127* 134* 132*   POTASSIUM 4.7   < > 5.1 5.1 5.0   CHLORIDE 100   < > 97 102 102   CO2 24   < > 23 25 23   BUN 29*   < > 27* 26* 26*   CREATININE 1.02   < > 1.15 0.86 0.98   MAGNESIUM 1.9  --   --   --   --    CALCIUM 8.3*   < > 8.7 8.4* 8.6    < > = values in this interval not displayed.     Recent Labs     03/23/21 2048 03/24/21  0856 03/25/21  0134   ALTSGPT  --   --  67*   ASTSGOT  --   --  22   ALKPHOSPHAT  --   --  82   TBILIRUBIN  --   --  0.5   GLUCOSE 213* 136* 155*         CT-HEAD W/O   Final Result      Left extra-axial hematoma is again noted. Subacute blood closer to the vertex along the left frontoparietal lobes is mildly increased compared to prior measuring 1.2 compared to 1 cm.      Mass effect is again noted with 4.6 mm midline shift to the right, slightly increased compared to prior.      CT-ABDOMEN-PELVIS WITH   Final Result      1.  Ascites is no longer identified.      2.  Liver surface is mildly nodular consistent with cirrhosis.      3.   Splenomegaly is again noted. Punctate focus of decreased attenuation in the spleen could be due to normal differential enhancement although small hemangioma is also possible.      4.  Ventral abdominal wall hernia containing abdominal fat is again identified.      5.  Consolidation and volume loss is noted posteriorly in the right lower lobe which could indicate pneumonia.      6.  Diverticulosis.         DX-CHEST-PORTABLE (1 VIEW)   Final Result      Small right pleural effusion.      Right basilar opacities may represent atelectasis or scarring.         CT-HEAD W/O   Final Result      1.  Stable size of the extra-axial hemorrhage overlying the left cerebral hemisphere with sulci effacement and approximately 3 mm of left-to-right midline shift.         IR-CAROTID-CEREBRAL BILATERAL   Final Result         1.  Normal cerebral arteriogram.      2.  No evidence of arteriovenous malformation or intracranial aneurysm formation.      3.  No evidence of atherosclerotic disease in the right common femoral artery.      CT-CTA HEAD WITH & W/O-POST PROCESS   Final Result      No evidence of occlusive lesion or aneurysm.      Multiple tortuous small vessels along the anterior-inferior aspect of the left temporal lobe could represent some type of vascular malformation.      No significant interval change in left-sided holohemispheric subdural hematoma extending along the falx and layering over the tentorium.      CT-CTA NECK WITH & W/O-POST PROCESSING   Final Result      CT angiogram of the neck within normal limits.      CT-HEAD W/O   Final Result         1. Grossly unchanged left hemispheric subdural hematoma, most in the left frontal and temporal region.   2. No new intracranial hemorrhage.                  CT-HEAD W/O   Final Result      1.  Stable large LEFT hemispheric subdural hematoma with associated mass effect and midline shift.   2.  No significant change from prior exam obtained 2 hours earlier.      CT-HEAD W/O    Final Result      Large LEFT holohemispheric subdural hematoma with associated mass effect and 4 mm LEFT to RIGHT midline shift.      These findings were discussed with EFREM FITZGERALD on 3/12/2021 11:40 AM.      DX-CHEST-PORTABLE (1 VIEW)   Final Result      1.  Hypoinflation, improved from prior.   2.  Minimal RIGHT lung base atelectasis or scar.   3.  Minimal RIGHT pleural effusion.          Patient Active Problem List   Diagnosis   • Chronic gout of multiple sites   • Atrial fibrillation with RVR (HCC)   • Acquired circulating anticoagulants (HCC)   • COPD exacerbation (HCC)   • Essential hypertension, benign   • Right calf pain   • PVC (premature ventricular contraction)   • Benign prostatic hyperplasia without lower urinary tract symptoms   • Mixed hyperlipidemia   • Hypertriglyceridemia   • Type 2 diabetes mellitus with complication, without long-term current use of insulin (HCC)   • Obesity (BMI 30-39.9)   • Type 2 diabetes mellitus without complication, without long-term current use of insulin (HCC)   • Dehydration   • Right renal stone   • Cellulitis   • Chest x-ray abnormality   • Gout   • Osteomyelitis (HCC)   • Scabies   • Other insomnia   • COPD (chronic obstructive pulmonary disease) (HCC)   • Cellulitis of left lower extremity   • Sepsis (HCC)   • Pacemaker   • Valvular cardiomyopathy (HCC)   • Cellulitis of right lower extremity   • Acute respiratory failure with hypoxia (HCC)   • Paroxysmal SVT (supraventricular tachycardia) (HCC)   • Lactic acid acidosis   • Fluid overload secondary to IV fluids    • Leukocytosis with bandemia   • History of prosthetic mitral valve   • Subdural hematoma, acute (HCC)   • Hyponatremia   • Leukemoid reaction   • Grade IV hemorrhoids   • Venous stasis dermatitis of right lower extremity   • Generalized abdominal pain   • Alcoholic cirrhosis of liver without ascites (HCC)   • Cognitive deficits   • Thrombocytopenia (HCC)       Assessment and Plan:  * Subdural  hematoma, acute (HCC)  Assessment & Plan  SDH and ICH  CTA & 4 vessel cerebral angiogram negative for AVM  S/p craniotomy on 03/25  Goal SBP < 140 mmHg  Keep sodium normal  Frequent neurochecks  Dexamethasone 3 mg [id number] to continue per NSG  Levetiracetam 500 mg bid for seizure prophylaxis  PT/OT - rehab    History of prosthetic mitral valve  Assessment & Plan  Bioprosthetic. INR goal would be 2.5 - 3.5  Received phytonadione and prothrombin complex.  Holding warfarin.Defer to NSGY for recommendation regarding resumption.      COPD (chronic obstructive pulmonary disease) (HCC)  Assessment & Plan  Denied any hx of smoking  Never formally diagnosed as per patient, not on any home medications  -continue oxygen supplementation with a goal SpO2 of 90    Gout  Assessment & Plan  Chronic.  Continue home allopurinol    Type 2 diabetes mellitus without complication, without long-term current use of insulin (HCC)  Assessment & Plan  Hold metformin in hospital.  Insulin sliding scale  Hold home metformin.  Goal 100-180.  Hypoglycemia protocol.    Atrial fibrillation (HCC)  Assessment & Plan  Likely valvular Af s/p mitral valve replacement on warfarin  -hold for now given s/p craniotomy  For SDH  -will resume when it is appropriate as per neurology.    Valvular cardiomyopathy (HCC)  Assessment & Plan  Ok to hold, restart on reassessment    Pacemaker  Assessment & Plan  Currently not mri compatible, will need to assess with reps.    Per chart review patient had pacemaker placed in July 2014 ([employer], model # PM 1110, serial #[id number].  The lead is a [employer], model 2088TC/52.  The lead is serial #[id number].). Per [employer] online manual this device has not been tested for MRI compatibility.    Benign prostatic hyperplasia without lower urinary tract symptoms  Assessment & Plan  Unclear compliance w meds, monitor for now      Levar Truong MD  Resident Physician  Internal Medicine, [facility].

## 2021-03-26 NOTE — DISCHARGE PLANNING
Agency/Facility Name: [facility]  Spoke to:   Outcome: Inquiring about if pt still needs HH. ICU [facility] notified. If pt still needs HH, Frances requests an updated referral.

## 2021-03-26 NOTE — PROGRESS NOTES
"[facility] ICU Progress Note      Admit Date: 3/12/2021  Resident(s): Maykel Sanchez M.D.  Attending: HAO Schafer/ Dr. Pacheco    Date & Time:   3/26/2021   10:46 AM       Patient ID:    Name:             Morales Olivier   YOB: 1955  Age:                 65 y.o.  male   MRN:               [id number]    Diagnosis:    Subdural Hemorrhage     ID:    \"65 y.o. male who presented 3/12/2021 with history of bioprosthetic mitral valve replacement atrial fibrillation on chronic anticoagulation with warfarin transferred to the emergency department after he was noted to be altered and having right upper extremity weakness and aphasia.  He was noted to have large left holohemispheric subdural hematoma.  He was admitted to the intensive care unit his coagulopathy was reversed with Kcentra and was evaluated by neurosurgery.  He has a history of pacemaker and was unable to have an MRI.  CTA was concerning for possible anterior temporal tip AVM and he underwent a four-vessel cerebral angiogram which was negative for vascular malformations.\"    The patient was started on steroids and keppra for seizure PPx.  Patient was doing well on neurology floor and was pending disposition for cognitive deficits.  Unfortunately, surveillance CT of the head showed enlarging SDH with midline shift on 3/22.  Patient underwent craniotomy with drainage on 3/25, and transferred to ICU aftewards.    Interval Events:    Overnight Events: s/p craniotomy.  Drain with 100ml bloodly output  Neuro: confused oriented x2-3. No focal deficits  Cardiovascular: 80 sinus, SBP 120s-140s  Respiratory: room air, no distress  Renal: wnl  Fluids/lytes: 3900/1400  Gi/nutrition: PO  Infectious: WBC 12.2. afrebrile  Endocrine: SSI  Hemo/Onc: Hb 12-13. Platelets 84, CTM    Dispo: ICU post nuerosurgery  GI Ppx: none  DVT Ppx: hold for cranio  Code status: full      Consultants:  Nueorsurgery   PMA:     Procedures:  3/25 - left craniotomy for drainage of SDH    Review of " Systems   Constitutional: Positive for malaise/fatigue.   Gastrointestinal: Positive for nausea.       VITALS:  Pulse: 80  Blood Pressure : 123/79, Arterial BP: 126/72       Temp  Av.4 °C (97.5 °F)  Min: 36.1 °C (97 °F)  Max: 36.7 °C (98.1 °F)         Physical Exam:  Physical Exam   Constitutional: He is oriented to person, place, and time and well-developed, well-nourished, and in no distress.   HENT:   Head: Normocephalic and atraumatic.   Eyes: EOM are normal.   Cardiovascular: Normal rate, regular rhythm and normal heart sounds.   Pulmonary/Chest: Breath sounds normal. No respiratory distress. He has no wheezes. He has no rales.   Abdominal: Soft. Bowel sounds are normal. He exhibits no distension. There is no abdominal tenderness.   Musculoskeletal:         General: No deformity or edema. Normal range of motion.      Cervical back: Normal range of motion and neck supple.   Neurological: He is alert and oriented to person, place, and time. No cranial nerve deficit. Coordination normal.   Slow mentation, anxious  Craniotomy drain with blood output   Skin: Skin is warm and dry. No rash noted. No erythema.   Psychiatric: Mood and affect normal.          HemoDynamics:  Pulse: 80 Blood Pressure : 123/79, Arterial BP: 126/72      Respiratory:     Respiration: (!) 24, Pulse Oximetry: 92 %    Chest Tube Drains:        Neuro:    Fluids:  Intake/Output       21 07 - 21 0659 21 07 - 21 0659 21 07 - 21 0659      2953-4150 5961-6374 Total 8638-7290 9201-0087 Total 9933-3778 3143-4942 Total       Intake    P.O.  480  240 720  1050  180 1230  --  -- --    P.O. 480  180 1230 -- -- --    I.V.  --  -- --    319 9790  --  -- --    Volume (mL) (Lactated Ringers) -- -- -- 1000 -- 1000 -- -- --    Volume (mL) (NS infusion) -- -- -- 200 -- 200 -- -- --    Volume (mL) (lactated ringers infusion) -- -- -- 100 -- 100 -- -- --    Volume (mL) (0.9 % NaCl with KCl 20 mEq  infusion) -- -- --  -- -- --    IV Piggyback  --  -- --  100  -- 100  --  -- --    Volume (mL) (ceFAZolin in dextrose (ANCEF) IVPB premix 2 g) -- -- -- 100 -- 100 -- -- --    Total Intake 480  780 3915 -- -- --       Output    Urine  --  300 300  425  825 1250  300  -- 300    Number of Times Voided -- 2 x 2 x -- 4 x 4 x 1 x -- 1 x    Urine Void (mL) -- 300 300  300 -- 300    Drains  --  -- --  115  -- 115  50  -- 50    Output (mL) (Closed/Suction Drain 1 Left;Ventral Scalp Hemovac 10 Fr.) -- -- -- 115 -- 115 50 -- 50    Blood  --  -- --  100  -- 100  --  -- --    Est. Blood Loss -- -- -- 100 -- 100 -- -- --    Total Output -- 300 300  350 -- 350       Net I/O     480 -60 420 2495 -45 2450 -350 -- -350        Weight: 95.8 kg (211 lb 3.2 oz)  Recent Labs     21  0821  01321  0400   SODIUM 134* 132* 127*   POTASSIUM 5.1 5.0 4.7   CHLORIDE 102 102 99   CO2 25 23 22   BUN 26* 26* 21   CREATININE 0.86 0.98 0.79   CALCIUM 8.4* 8.6 7.9*     Body mass index is 30.3 kg/m².    GI/Nutrition:  Recent Labs     21  0821  01321  0400   ALTSGPT  --  67*  --    ASTSGOT  --  22  --    ALKPHOSPHAT  --  82  --    TBILIRUBIN  --  0.5  --    GLUCOSE 136* 155* 155*       Heme:  Recent Labs     21  0856 21  01321  0400   RBC 4.17* 3.90* 3.68*   HEMOGLOBIN 13.7* 12.7* 12.0*   HEMATOCRIT 40.9* 38.3* 36.4*   PLATELETCT 110* 97* 84*   PROTHROMBTM 14.8*  --   --    APTT 22.8*  --   --    INR 1.12  --   --        Infectious Disease:  Temp  Av.4 °C (97.5 °F)  Min: 36.1 °C (97 °F)  Max: 36.7 °C (98.1 °F)  Recent Labs     21  0856 21  0134 21  0400   WBC 13.9* 12.7* 12.2*   NEUTSPOLYS 91.60* 91.20* 91.20*   LYMPHOCYTES 3.20* 3.30* 2.10*   MONOCYTES 4.00 4.10 5.60   EOSINOPHILS 0.00 0.00 0.10   BASOPHILS 0.10 0.20 0.10   ASTSGOT  --  22  --    ALTSGPT  --  67*  --    ALKPHOSPHAT  --  82  --    TBILIRUBIN  --  0.5   --        Medications:  • LR  1,000 mL     • Pharmacy Consult Request  1 Each     • MD ALERT...DO NOT ADMINISTER NSAIDS or ASPIRIN unless ORDERED By Neurosurgery  1 Each     • ondansetron  4 mg     • artificial tears  1 Application     • sodium chloride  1 g     • insulin regular  2-9 Units      And   • glucose  16 g      And   • dextrose 50%  50 mL     • insulin glargine  15 Units     • dexamethasone  3 mg     • acetaminophen  650 mg     • levETIRAcetam  500 mg     • lisinopril  2.5 mg     • metoprolol tartrate  25 mg     • allopurinol  100 mg     • tamsulosin  0.4 mg     • oxyCODONE immediate-release  5 mg      Or   • oxyCODONE immediate-release  10 mg     • morphine injection  2-4 mg     • labetalol  10 mg     • senna-docusate  2 tablet      And   • polyethylene glycol/lytes  1 Packet      And   • magnesium hydroxide  30 mL      And   • bisacodyl  10 mg     • Respiratory Therapy Consult       • LORazepam  2 mg     • hydrALAZINE  10 mg     • enalaprilat  1.25 mg     • prochlorperazine  10 mg          Imaging:  CT-HEAD W/O   Final Result      Interval left craniotomy with overall decrease in volume of subdural hematoma with some postsurgical extra-axial hemorrhage deep to the craniotomy flap with associated subarachnoid hemorrhage.      Persistent mass effect and midline shift from left-to-right of approximately 5 mm.      CT-HEAD W/O   Final Result      Left extra-axial hematoma is again noted. Subacute blood closer to the vertex along the left frontoparietal lobes is mildly increased compared to prior measuring 1.2 compared to 1 cm.      Mass effect is again noted with 4.6 mm midline shift to the right, slightly increased compared to prior.      CT-ABDOMEN-PELVIS WITH   Final Result      1.  Ascites is no longer identified.      2.  Liver surface is mildly nodular consistent with cirrhosis.      3.  Splenomegaly is again noted. Punctate focus of decreased attenuation in the spleen could be due to normal  differential enhancement although small hemangioma is also possible.      4.  Ventral abdominal wall hernia containing abdominal fat is again identified.      5.  Consolidation and volume loss is noted posteriorly in the right lower lobe which could indicate pneumonia.      6.  Diverticulosis.         DX-CHEST-PORTABLE (1 VIEW)   Final Result      Small right pleural effusion.      Right basilar opacities may represent atelectasis or scarring.         CT-HEAD W/O   Final Result      1.  Stable size of the extra-axial hemorrhage overlying the left cerebral hemisphere with sulci effacement and approximately 3 mm of left-to-right midline shift.         IR-CAROTID-CEREBRAL BILATERAL   Final Result         1.  Normal cerebral arteriogram.      2.  No evidence of arteriovenous malformation or intracranial aneurysm formation.      3.  No evidence of atherosclerotic disease in the right common femoral artery.      CT-CTA HEAD WITH & W/O-POST PROCESS   Final Result      No evidence of occlusive lesion or aneurysm.      Multiple tortuous small vessels along the anterior-inferior aspect of the left temporal lobe could represent some type of vascular malformation.      No significant interval change in left-sided holohemispheric subdural hematoma extending along the falx and layering over the tentorium.      CT-CTA NECK WITH & W/O-POST PROCESSING   Final Result      CT angiogram of the neck within normal limits.      CT-HEAD W/O   Final Result         1. Grossly unchanged left hemispheric subdural hematoma, most in the left frontal and temporal region.   2. No new intracranial hemorrhage.                  CT-HEAD W/O   Final Result      1.  Stable large LEFT hemispheric subdural hematoma with associated mass effect and midline shift.   2.  No significant change from prior exam obtained 2 hours earlier.      CT-HEAD W/O   Final Result      Large LEFT holohemispheric subdural hematoma with associated mass effect and 4 mm LEFT to  RIGHT midline shift.      These findings were discussed with EFREM FITZGERALD on 3/12/2021 11:40 AM.      DX-CHEST-PORTABLE (1 VIEW)   Final Result      1.  Hypoinflation, improved from prior.   2.  Minimal RIGHT lung base atelectasis or scar.   3.  Minimal RIGHT pleural effusion.          Assessment and plan    * Subdural hematoma, acute (HCC)  Assessment & Plan  SDH and ICH  CTA & 4 vessel cerebral angiogram negative for AVM  S/p craniotomy on 03/25  Goal SBP < 140 mmHg  Keep sodium normal  Frequent neurochecks  Dexamethasone 3 mg g5iptdy to continue per NSG  Levetiracetam 500 mg bid for seizure prophylaxis  PT/OT - rehab    History of prosthetic mitral valve  Assessment & Plan  Bioprosthetic. INR goal would be 2.5 - 3.5  Received phytonadione and prothrombin complex.  Holding warfarin.Defer to NSGY for recommendation regarding resumption.      COPD (chronic obstructive pulmonary disease) (HCC)- (present on admission)  Assessment & Plan  Denied any hx of smoking  Never formally diagnosed as per patient, not on any home medications  -continue oxygen supplementation with a goal SpO2 of 90    Gout- (present on admission)  Assessment & Plan  Chronic.  Continue home allopurinol    Type 2 diabetes mellitus without complication, without long-term current use of insulin (HCC)- (present on admission)  Assessment & Plan  Hold metformin in hospital.  Insulin sliding scale  Hold home metformin.  Goal 100-180.  Hypoglycemia protocol.    Atrial fibrillation (HCC)  Assessment & Plan  Likely valvular Af s/p mitral valve replacement on warfarin  -hold for now given s/p craniotomy  For SDH  -will resume when it is appropriate as per neurology.    Valvular cardiomyopathy (HCC)- (present on admission)  Assessment & Plan  Ok to hold, restart on reassessment    Pacemaker  Assessment & Plan  Currently not mri compatible, will need to assess with reps.    Per chart review patient had pacemaker placed in July 2014 ([employer], model  # PM 1110, serial #[id number].  The lead is a [employer], model 2088TC/52.  The lead is serial #[id number].). Per [employer] online manual this device has not been tested for MRI compatibility.    Benign prostatic hyperplasia without lower urinary tract symptoms- (present on admission)  Assessment & Plan  Unclear compliance w meds, monitor for now

## 2021-03-26 NOTE — PROGRESS NOTES
Neurosurgery Progress Note    Subjective:  Complains of not feeling well with headache.     Exam:  Alert  Mental status unchanged  Perrl  Stronger in raising right upper extremity      BP  Min: 110/73  Max: 134/89  Pulse  Av  Min: 80  Max: 81  Resp  Avg: 15.8  Min: 11  Max: 24  Temp  Av.4 °C (97.5 °F)  Min: 36.1 °C (97 °F)  Max: 36.7 °C (98.1 °F)  SpO2  Av.6 %  Min: 78 %  Max: 100 %    No data recorded    Recent Labs     21  0856 21  0134 21  0400   WBC 13.9* 12.7* 12.2*   RBC 4.17* 3.90* 3.68*   HEMOGLOBIN 13.7* 12.7* 12.0*   HEMATOCRIT 40.9* 38.3* 36.4*   MCV 98.1* 98.2* 98.9*   MCH 32.9 32.6 32.6   MCHC 33.5* 33.2* 33.0*   RDW 59.7* 59.7* 58.4*   PLATELETCT 110* 97* 84*   MPV 11.4 10.9 10.7     Recent Labs     21  0856 21  0134 21  0400   SODIUM 134* 132* 127*   POTASSIUM 5.1 5.0 4.7   CHLORIDE 102 102 99   CO2 25 23 22   GLUCOSE 136* 155* 155*   BUN 26* 26* 21   CREATININE 0.86 0.98 0.79   CALCIUM 8.4* 8.6 7.9*     Recent Labs     21   APTT 22.8*   INR 1.12           Intake/Output       21 0700 - 21 0659 21 07 - 21 0659      6764-0926 4854-2183 Total 9254-2620 4024-8336 Total       Intake    P.O.  1050  180 1230  --  -- --    P.O. 8721 220 5133 -- -- --    I.V.  1985  928 1499  --  -- --    Volume (mL) (Lactated Ringers) 1000 -- 1000 -- -- --    Volume (mL) (NS infusion) 200 -- 200 -- -- --    Volume (mL) (lactated ringers infusion) 100 -- 100 -- -- --    Volume (mL) (0.9 % NaCl with KCl 20 mEq infusion)  -- -- --    IV Piggyback  100  -- 100  --  -- --    Volume (mL) (ceFAZolin in dextrose (ANCEF) IVPB premix 2 g) 100 -- 100 -- -- --    Total Intake 3135 780 3915 -- -- --       Output    Urine  425  825 1250  --  -- --    Number of Times Voided -- 4 x 4 x -- -- --    Urine Void (mL)  -- -- --    Drains  115  -- 115  50  -- 50    Output (mL) (Closed/Suction Drain 1 Left;Ventral Scalp Hemovac 10 Fr.)  115 -- 115 50 -- 50    Blood  100  -- 100  --  -- --    Est. Blood Loss 100 -- 100 -- -- --    Total Output  50 -- 50       Net I/O     2495 -45 2450 -50 -- -50            Intake/Output Summary (Last 24 hours) at 3/26/2021 0939  Last data filed at 3/26/2021 0800  Gross per 24 hour   Intake 2715 ml   Output 1415 ml   Net 1300 ml       $ Bladder Scan Results (mL): 283    • Pharmacy Consult Request  1 Each PHARMACY TO DOSE   • MD ALERT...DO NOT ADMINISTER NSAIDS or ASPIRIN unless ORDERED By Neurosurgery  1 Each PRN   • ondansetron  4 mg Q4HRS PRN   • 0.9 % NaCl with KCl 20 mEq 1,000 mL   Continuous   • artificial tears  1 Application PRN   • sodium chloride  1 g BID WITH MEALS   • insulin regular  2-9 Units 4X/DAY ACHS    And   • glucose  16 g Q15 MIN PRN    And   • dextrose 50%  50 mL Q15 MIN PRN   • insulin glargine  15 Units QAM INSULIN   • dexamethasone  3 mg Q8HRS   • acetaminophen  650 mg Q6HRS   • levETIRAcetam  500 mg BID   • lisinopril  2.5 mg Q DAY   • metoprolol tartrate  25 mg TWICE DAILY   • allopurinol  100 mg QAM   • tamsulosin  0.4 mg DAILY   • oxyCODONE immediate-release  5 mg Q4HRS PRN    Or   • oxyCODONE immediate-release  10 mg Q4HRS PRN   • morphine injection  2-4 mg Q3HRS PRN   • labetalol  10 mg Q4HRS PRN   • senna-docusate  2 tablet BID    And   • polyethylene glycol/lytes  1 Packet QDAY PRN    And   • magnesium hydroxide  30 mL QDAY PRN    And   • bisacodyl  10 mg QDAY PRN   • Respiratory Therapy Consult   Continuous RT   • LORazepam  2 mg Q5 MIN PRN   • hydrALAZINE  10 mg Q4HRS PRN   • enalaprilat  1.25 mg Q6HRS PRN   • prochlorperazine  10 mg Q6HRS PRN       Assessment and Plan:    POD #1  Prophylactic anticoagulation: no     Start date/time: 7 days post op    Has some subarachnoid hemorrhage from small bleeder on the surface of the brain intermingled with membranes. Post small subdural collection at site of surgery  Otherwise CT ok    He is stable  Neuro to q 4  Up to chair and  advance diet  Continue Keppra at 500 mg bid.   Drain out tomorrow.

## 2021-03-26 NOTE — THERAPY
Occupational Therapy Contact Note:       03/26/21 8775   Interdisciplinary Plan of Care Collaboration   Collaboration Comments OT orders received, pt w/ strict bedrest orders in place. Will hold until orders have been discontinued per policy.        Brooke Conte, OTR/L

## 2021-03-26 NOTE — THERAPY
Physical Therapy   Daily Treatment     Patient Name: Morales Olivier  Age:  65 y.o., Sex:  male  Medical Record #: [id number]  Today's Date: 3/26/2021     Precautions: Fall Risk, Swallow Precautions (See Comments)(s/p crani 3/25 (Ok to resume therapy at this time))    Assessment    Pt presents now s/p crani. He was able to demonstrate short distance ambulation with FWW with CGA. He appears similar to pre op with re: function and will continue to visit with details/recs as below. Note that at this time would recommend placement at time of dc given current endurance/fatigue with limited activity though anticipate pt will progress quickly with increased OOB activity from both PT and floor staff.    Plan    Continue current treatment plan.    DC Equipment Recommendations: Unable to determine at this time  Discharge Recommendations: Recommend post-acute placement for additional physical therapy services prior to discharge home(currently; anticipate quick progression post op)         03/26/21 1611   Cognition    Cognition / Consciousness X   Speech/ Communication Delayed Responses   Level of Consciousness Alert   Safety Awareness Impaired;Impulsive   Attention Impaired   Comments does fair with encouragement; increased time with activity/positioning; unclear baseline   Passive ROM Lower Body   Passive ROM Lower Body WDL   Active ROM Lower Body    Active ROM Lower Body  WDL   Strength Lower Body   Lower Body Strength  X   Comments appears grossly WFL   Sensation Lower Body   Lower Extremity Sensation   Not Tested   Neurological Concerns   Neurological Concerns Yes   Comments given cognition   Balance   Sitting Balance (Static) Fair +   Sitting Balance (Dynamic) Fair +   Standing Balance (Static) Fair   Standing Balance (Dynamic) Fair   Weight Shift Sitting Good   Weight Shift Standing Fair   Skilled Intervention Verbal Cuing;Compensatory Strategies;Tactile Cuing   Comments no isabel LOB during ambulation with FWW; however  cueing for placement of FWW and RAS as well as lines/tubes   Gait Analysis   Gait Level Of Assist   (CGA)   Assistive Device Front Wheel Walker   Distance (Feet) 20   # of Times Distance was Traveled 1   Deviation Increased Base Of Support;Decreased Heel Strike;Decreased Toe Off;Other (Comment)  (dec kaelyn/clearance; needs redirection)   # of Stairs Climbed 0   Weight Bearing Status no restrictions   Skilled Intervention Verbal Cuing;Compensatory Strategies;Facilitation;Tactile Cuing;Sequencing   Comments see balance   Bed Mobility    Supine to Sit Modified Independent   Sit to Supine Modified Independent   Scooting Modified Independent   Skilled Intervention Verbal Cuing;Compensatory Strategies   Comments impuslive though performs without physical assist   Functional Mobility   Sit to Stand   (CGA)   Bed, Chair, Wheelchair Transfer   (CGA)   Transfer Method Stand Step   Mobility with FWW   ICU Target Mobility Level   ICU Mobility - Targeted Level Level 4   How much difficulty does the patient currently have...   Turning over in bed (including adjusting bedclothes, sheets and blankets)? 3   Sitting down on and standing up from a chair with arms (e.g., wheelchair, bedside commode, etc.) 2   Moving from lying on back to sitting on the side of the bed? 3   How much help from another person does the patient currently need...   Moving to and from a bed to a chair (including a wheelchair)? 3   Need to walk in a hospital room? 3   Climbing 3-5 steps with a railing? 3   6 clicks Mobility Score 17   Activity Tolerance   Sitting in Chair NT   Sitting Edge of Bed at least 12 mins   Standing at least 6-7 mins   Comments generally decreased endruance, though noted soft BP throughout may be contributing   Patient / Family Goals    Patient / Family Goal #1 feel better   Short Term Goals    Short Term Goal # 1 Patient will move supine<>sitting EOB with supervision within 6tx in order to get in/out of bed   Goal Outcome # 1  Progressing as expected   Short Term Goal # 2 Patient will move sitting<>standing with supervision within 6tx in order to initiate gait and transfers   Goal Outcome # 2 Progressing as expected   Short Term Goal # 3 Patient will ambulate 50ft with supervision within 6tx in order to access environment   Goal Outcome # 3 Goal not met   Education Group   Education Provided Use of Assistive Device   Role of Physical Therapist Patient Response Patient;Acceptance;Explanation;Action Demonstration   Use of Assistive Device Patient Response Patient;Acceptance;Explanation;Verbal Demonstration;Action Demonstration;Reinforcement Needed

## 2021-03-26 NOTE — PROGRESS NOTES
Patient  understands importance in prevention of skin breakdown, ulcers, and potential infection. Hourly rounding in effect. RN skin check complete with Jen CELESTE RN  Devices in place include: SCD's, artline, EKG, O2, BP cuff.  Skin assessed under devices: Yes.  Wound on RLE dressing inplace from floor  Coccyx red but blanching q2 turns in place and mepliex  Abrasions on bilateral arms and legs excoriated.

## 2021-03-26 NOTE — DISCHARGE PLANNING
Informed by Vern PANDYA, that if patient still needs HH services post dc, a new resumption order is needed. Patient is still on active bedrest orders. Once PT/OT eval and give new recommendations, we'll determine the best course of action for dc.

## 2021-03-26 NOTE — CARE PLAN
Problem: Knowledge Deficit  Goal: Knowledge of disease process/condition, treatment plan, diagnostic tests, and medications will improve  Outcome: PROGRESSING AS EXPECTED     Problem: Respiratory:  Goal: Respiratory status will improve  Outcome: PROGRESSING AS EXPECTED     Problem: Urinary Elimination:  Goal: Ability to reestablish a normal urinary elimination pattern will improve  Outcome: PROGRESSING AS EXPECTED

## 2021-03-26 NOTE — THERAPY
Contact Note    Patient Name: Morales Olivier  Age:  65 y.o., Sex:  male  Medical Record #: [id number]  Today's Date: 3/26/2021     post crani, now with strict bedrest orders per chart; will hold until post as policy; pt on service and will continue tx or re-eval if change in status as appropriate

## 2021-03-26 NOTE — CARE PLAN
Problem: Communication  Goal: The ability to communicate needs accurately and effectively will improve  Outcome: MET     Problem: Infection  Goal: Will remain free from infection  Outcome: MET     Problem: Bowel/Gastric:  Goal: Normal bowel function is maintained or improved  Outcome: MET  Goal: Will not experience complications related to bowel motility  Outcome: MET     Problem: Knowledge Deficit  Goal: Knowledge of the prescribed therapeutic regimen will improve  Outcome: MET  Note: Provided with each interaction     Problem: Fluid Volume:  Goal: Will maintain balanced intake and output  Outcome: MET     Problem: Skin Integrity  Goal: Risk for impaired skin integrity will decrease  Outcome: PROGRESSING AS EXPECTED     Problem: Psychosocial Needs:  Goal: Level of anxiety will decrease  Outcome: MET     Problem: Urinary Elimination:  Goal: Ability to reestablish a normal urinary elimination pattern will improve  Outcome: PROGRESSING AS EXPECTED     Problem: Mobility  Goal: Risk for activity intolerance will decrease  Outcome: MET

## 2021-03-27 NOTE — THERAPY
"Occupational Therapy  Daily Treatment     Patient Name: Morales Olivier  Age:  65 y.o., Sex:  male  Medical Record #: [id number]  Today's Date: 3/26/2021     Precautions  Precautions: Fall Risk, Swallow Precautions ( See Comments)(s/p crani 3/25 (Ok to resume therapy at this time))  Comments: (P) s/p crani    Assessment    Pt is s/p Crani, no new deficits noted at this time. Pt seen for OT tx session, pt agreeable to participate, required min A for functional mobility w/ FWW and standing voiding\". Pt demo's seated grooming w/ SPV. Pt continues to demo impaired balance, functional mobility, and activity tolerance. Will continue to follow.     Plan    Continue current treatment plan.    DC Equipment Recommendations: (P) Unable to determine at this time  Discharge Recommendations: (P) Recommend post-acute placement for additional occupational therapy services prior to discharge home    Subjective    \"It feels like I have a bullet in my head!\"     Objective       03/26/21 1601   Total Time Spent   Total Time Spent (Mins) 30   Treatment Charges   Charges Yes   OT Self Care / ADL 2   Precautions   Precautions Fall Risk;Swallow Precautions ( See Comments)   Comments s/p crani   Vitals   O2 (LPM) 3   O2 Delivery Device None - Room Air   Pain 0 - 10 Group   Therapist Pain Assessment Post Activity Pain Same as Prior to Activity;Nurse Notified  (c/o h/a )   Cognition    Cognition / Consciousness X   Speech/ Communication Delayed Responses   Orientation Level Not Oriented to Address;Not Oriented to Year;Not Oriented to Day;Not Oriented to Time;Not Oriented to Reason   Level of Consciousness Alert   Safety Awareness Impaired;Impulsive   Attention Impaired   Comments overall cooperative   Balance   Sitting Balance (Static) Fair +   Sitting Balance (Dynamic) Fair +   Standing Balance (Static) Fair   Standing Balance (Dynamic) Fair   Weight Shift Sitting Good   Weight Shift Standing Fair   Skilled Intervention Tactile Cuing;Verbal " Cuing;Facilitation   Comments no isabel LOB   Bed Mobility    Supine to Sit Supervised   Sit to Supine Supervised   Scooting Supervised   Rolling Supervised   Skilled Intervention Compensatory Strategies;Tactile Cuing;Verbal Cuing   Activities of Daily Living   Grooming Supervision;Seated   Lower Body Dressing Maximal Assist  (primarily due to behaviors )   Toileting Minimal Assist  (standing voiding)   Skilled Intervention Verbal Cuing   How much help from another person does the patient currently need...   Putting on and taking off regular lower body clothing? 2   Bathing (including washing, rinsing, and drying)? 2   Toileting, which includes using a toilet, bedpan, or urinal? 3   Putting on and taking off regular upper body clothing? 3   Taking care of personal grooming such as brushing teeth? 3   Eating meals? 3   6 Clicks Daily Activity Score 16   Functional Mobility   Sit to Stand Minimal Assist   Bed, Chair, Wheelchair Transfer Minimal Assist   Mobility within room w/ FWW   Skilled Intervention Verbal Cuing   Comments w/ FWW   ICU Target Mobility Level   ICU Mobility - Targeted Level Level 4   Activity Tolerance   Sitting in Chair refused   Sitting Edge of Bed 12 min    Standing 6-7 min    Patient / Family Goals   Patient / Family Goal #1 to not feel so bad   Goal #1 Outcome Progressing as expected   Short Term Goals   Short Term Goal # 1 Pt will demo LB dressing using AE PRN w/ SPV   Goal Outcome # 1 Progressing as expected   Short Term Goal # 2 Pt will demo standing grooming w/ SPV   Goal Outcome # 2 Progressing as expected   Short Term Goal # 3 Pt will demo toilet txf w/ SPV   Goal Outcome # 3 Progressing as expected   Short Term Goal # 4 Pt will demo toilet hygiene w/ SPV   Goal Outcome # 4 Progressing as expected   Education Group   Role of Occupational Therapist Patient Response Patient;Acceptance;Explanation   ADL Patient Response Patient;Acceptance;Explanation;Demonstration;Verbal Demonstration;Action  Demonstration;Reinforcement Needed   Anticipated Discharge Equipment and Recommendations   DC Equipment Recommendations Unable to determine at this time   Discharge Recommendations Recommend post-acute placement for additional occupational therapy services prior to discharge home   Interdisciplinary Plan of Care Collaboration   IDT Collaboration with  Nursing   Patient Position at End of Therapy In Bed;Bed Alarm On;Call Light within Reach;Tray Table within Reach;Phone within Reach  (RN in room)   Collaboration Comments pleasent and cooperative   Session Information   Date / Session Number  3/26, 4 (2/3, 3/28)   Priority 2

## 2021-03-27 NOTE — CARE PLAN
Problem: Knowledge Deficit  Goal: Knowledge of disease process/condition, treatment plan, diagnostic tests, and medications will improve  Outcome: PROGRESSING AS EXPECTED     Problem: Discharge Barriers/Planning  Goal: Patient's continuum of care needs will be met  Outcome: PROGRESSING AS EXPECTED     Problem: Skin Integrity  Goal: Risk for impaired skin integrity will decrease  Outcome: PROGRESSING AS EXPECTED     Problem: Respiratory:  Goal: Respiratory status will improve  Outcome: PROGRESSING AS EXPECTED     Problem: Urinary Elimination:  Goal: Ability to reestablish a normal urinary elimination pattern will improve  Outcome: PROGRESSING AS EXPECTED     Problem: Safety  Goal: Will remain free from injury  Outcome: PROGRESSING AS EXPECTED  Goal: Will remain free from falls  Outcome: PROGRESSING AS EXPECTED     Problem: Pain Management  Goal: Pain level will decrease to patient's comfort goal  Outcome: PROGRESSING AS EXPECTED     Problem: Venous Thromboembolism (VTW)/Deep Vein Thrombosis (DVT) Prevention:  Goal: Patient will participate in Venous Thrombosis (VTE)/Deep Vein Thrombosis (DVT)Prevention Measures  Outcome: PROGRESSING AS EXPECTED     Problem: Medication  Goal: Compliance with prescribed medication will improve  Outcome: PROGRESSING AS EXPECTED

## 2021-03-27 NOTE — PROGRESS NOTES
"[facility] Progress Note      Admit Date: 3/12/2021  Resident(s): Maykel Sanchez M.D.  Attending: HAO Schafer/ Dr. Pacheco    Date & Time:   3/27/2021   1:29 PM       Patient ID:    Name:             Morales Olivier   YOB: 1955  Age:                 65 y.o.  male   MRN:               [id number]    Diagnosis:    Subdural Hemorrhage     ID:    \"65 y.o. male who presented 3/12/2021 with history of bioprosthetic mitral valve replacement atrial fibrillation on chronic anticoagulation with warfarin transferred to the emergency department after he was noted to be altered and having right upper extremity weakness and aphasia.  He was noted to have large left holohemispheric subdural hematoma.  He was admitted to the intensive care unit his coagulopathy was reversed with Kcentra and was evaluated by neurosurgery.  He has a history of pacemaker and was unable to have an MRI.  CTA was concerning for possible anterior temporal tip AVM and he underwent a four-vessel cerebral angiogram which was negative for vascular malformations.\"    The patient was started on steroids and keppra for seizure PPx.  Patient was doing well on neurology floor and was pending disposition for cognitive deficits.  Unfortunately, surveillance CT of the head showed enlarging SDH with midline shift on 3/22.  Patient underwent craniotomy with drainage on 3/25, and transferred to ICU aftewards.    Interval Events:      Overnight Events: s/p craniotomy.  Drain with minimum output. Likely drain out tomorrow  Neuro: confused oriented x2-3. No focal deficits  Cardiovascular: 80 sinus, SBP 120s-140s  Respiratory: room air, no distress  Renal: wnl  Fluids/lytes: 3900/1400  Gi/nutrition: PO  Infectious: WBC 12.2. afrebrile  Endocrine: SSI  Hemo/Onc: Hb 12-13. Platelets 84, CTM    Dispo: ICU post nuerosurgery  GI Ppx: none  DVT Ppx: hold for cranio  Code status: full      Consultants:  Nueorsurgery   PMA:     Procedures:  3/25 - left craniotomy for " drainage of SDH    Review of Systems   Constitutional: Positive for malaise/fatigue.   Gastrointestinal: Positive for nausea.       VITALS:  Pulse: 80  Blood Pressure : 101/68, Arterial BP: 101/60       Temp  Av.6 °C (97.9 °F)  Min: 36.1 °C (97 °F)  Max: 37 °C (98.6 °F)         Physical Exam:  Physical Exam   Constitutional: He is oriented to person, place, and time and well-developed, well-nourished, and in no distress.   HENT:   Head: Normocephalic and atraumatic.   Eyes: EOM are normal.   Cardiovascular: Normal rate, regular rhythm and normal heart sounds.   Pulmonary/Chest: Breath sounds normal. No respiratory distress. He has no wheezes. He has no rales.   Abdominal: Soft. Bowel sounds are normal. He exhibits no distension. There is no abdominal tenderness.   Musculoskeletal:         General: No deformity or edema. Normal range of motion.      Cervical back: Normal range of motion and neck supple.   Neurological: He is alert and oriented to person, place, and time. No cranial nerve deficit. Coordination normal.   Slow mentation, anxious  Craniotomy drain with blood output   Skin: Skin is warm and dry. No rash noted. No erythema.   Psychiatric: Mood and affect normal.          HemoDynamics:  Pulse: 80 Blood Pressure : 101/68, Arterial BP: 101/60      Respiratory:     Respiration: 14, Pulse Oximetry: 93 %    Chest Tube Drains:        Neuro:    Fluids:  Intake/Output       21 0700 - 21 0659 21 07 - 21 0659 21 07 - 21 0659      [id number] 8658-8748 Total 8720-2709 4175-8186 Total 6444-0103 9506-1617 Total       Intake    P.O.  1050  180 1230  --  480 480  120  -- 120    P.O. 9282 095 1334 -- 480 480 120 -- 120    I.V.  1985  600 9145  998.3  -- 998.3  --  -- --    Volume (mL) (Lactated Ringers) 1000 -- 1000 -- -- -- -- -- --    Volume (mL) (NS infusion) 200 -- 200 -- -- -- -- -- --    Volume (mL) (lactated ringers infusion) 100 -- 100 -- -- -- -- -- --    Volume (mL) (0.9  % NaCl with KCl 20 mEq infusion)  998.3 -- 998.3 -- -- --    IV Piggyback  100  -- 100  1100  500 1600  500  -- 500    Volume (mL) (lactated ringer BOLUS infusion) -- -- -- 1000 -- 1000 -- -- --    Volume (mL) (NS (BOLUS) infusion 500 mL) -- -- -- -- 500 500 -- -- --    Volume (mL) (NS (BOLUS) infusion 500 mL) -- -- -- -- -- -- 500 -- 500    Volume (mL) (ceFAZolin in dextrose (ANCEF) IVPB premix 2 g) 100 -- 100 100 -- 100 -- -- --    Total Intake 3135 780 3915 2098.3 980 3078.3 620 -- 620       Output    Urine  425  825 1250  300  1975  400  -- 400    Number of Times Voided -- 4 x 4 x 1 x -- 1 x -- -- --    Urine Void (mL)  300 -- 300 -- -- --    Output (mL) (External Urinary Catheter (Condom)) -- -- -- -- 1975 400 -- 400    Drains  115  -- 115  70  25 95  4  -- 4    Output (mL) (Closed/Suction Drain 1 Left;Ventral Scalp Hemovac 10 Fr.) 115 -- 115 70 25 95 4 -- 4    Blood  100  -- 100  --  -- --  --  -- --    Est. Blood Loss 100 -- 100 -- -- -- -- -- --    Total Output  370  2370 404 -- 404       Net I/O     2495 -45 2450 1728.3 -1020 708.3 216 -- 216        Weight: 101 kg (222 lb 14.2 oz)  Recent Labs     21  0424   SODIUM 132* 127* 129*   POTASSIUM 5.0 4.7 5.0   CHLORIDE 102 99 100   CO2 23 22 22   BUN *  21   CREATININE 0.98 0.79 0.83   CALCIUM 8.6 7.9* 8.2*     Body mass index is 31.98 kg/m².    GI/Nutrition:  Recent Labs     03/25/21  21   ALTSGPT 67*  --   --    ASTSGOT 22  --   --    ALKPHOSPHAT 82  --   --    TBILIRUBIN 0.5  --   --    GLUCOSE 155* 155* 190*       Heme:  Recent Labs     21   RBC 3.90* 3.68* 3.33*   HEMOGLOBIN 12.7* 12.0* 10.9*   HEMATOCRIT 38.3* 36.4* 32.8*   PLATELETCT 97* 84* 62*       Infectious Disease:  Temp  Av.6 °C (97.9 °F)  Min: 36.1 °C (97 °F)  Max: 37 °C (98.6 °F)  Recent Labs     21  03/27/21  0424   WBC 12.7* 12.2* 7.2   NEUTSPOLYS 91.20* 91.20* 89.70*   LYMPHOCYTES 3.30* 2.10* 4.40*   MONOCYTES 4.10 5.60 4.40   EOSINOPHILS 0.00 0.10 0.00   BASOPHILS 0.20 0.10 0.10   ASTSGOT 22  --   --    ALTSGPT 67*  --   --    ALKPHOSPHAT 82  --   --    TBILIRUBIN 0.5  --   --        Medications:  • sodium chloride  1 g     • metoprolol tartrate  12.5 mg     • [START ON 3/28/2021] lisinopril  2.5 mg     • Pharmacy Consult Request  1 Each     • MD ALERT...DO NOT ADMINISTER NSAIDS or ASPIRIN unless ORDERED By Neurosurgery  1 Each     • ondansetron  4 mg     • artificial tears  1 Application     • insulin regular  2-9 Units      And   • glucose  16 g      And   • dextrose 50%  50 mL     • insulin glargine  15 Units     • dexamethasone  3 mg     • acetaminophen  650 mg     • levETIRAcetam  500 mg     • allopurinol  100 mg     • tamsulosin  0.4 mg     • oxyCODONE immediate-release  5 mg      Or   • oxyCODONE immediate-release  10 mg     • morphine injection  2-4 mg     • labetalol  10 mg     • senna-docusate  2 tablet      And   • polyethylene glycol/lytes  1 Packet      And   • magnesium hydroxide  30 mL      And   • bisacodyl  10 mg     • Respiratory Therapy Consult       • LORazepam  2 mg     • hydrALAZINE  10 mg     • enalaprilat  1.25 mg     • prochlorperazine  10 mg          Imaging:  CT-HEAD W/O   Final Result      Interval left craniotomy with overall decrease in volume of subdural hematoma with some postsurgical extra-axial hemorrhage deep to the craniotomy flap with associated subarachnoid hemorrhage.      Persistent mass effect and midline shift from left-to-right of approximately 5 mm.      CT-HEAD W/O   Final Result      Left extra-axial hematoma is again noted. Subacute blood closer to the vertex along the left frontoparietal lobes is mildly increased compared to prior measuring 1.2 compared to 1 cm.      Mass effect is again noted with 4.6 mm midline shift to the right, slightly increased compared  to prior.      CT-ABDOMEN-PELVIS WITH   Final Result      1.  Ascites is no longer identified.      2.  Liver surface is mildly nodular consistent with cirrhosis.      3.  Splenomegaly is again noted. Punctate focus of decreased attenuation in the spleen could be due to normal differential enhancement although small hemangioma is also possible.      4.  Ventral abdominal wall hernia containing abdominal fat is again identified.      5.  Consolidation and volume loss is noted posteriorly in the right lower lobe which could indicate pneumonia.      6.  Diverticulosis.         DX-CHEST-PORTABLE (1 VIEW)   Final Result      Small right pleural effusion.      Right basilar opacities may represent atelectasis or scarring.         CT-HEAD W/O   Final Result      1.  Stable size of the extra-axial hemorrhage overlying the left cerebral hemisphere with sulci effacement and approximately 3 mm of left-to-right midline shift.         IR-CAROTID-CEREBRAL BILATERAL   Final Result         1.  Normal cerebral arteriogram.      2.  No evidence of arteriovenous malformation or intracranial aneurysm formation.      3.  No evidence of atherosclerotic disease in the right common femoral artery.      CT-CTA HEAD WITH & W/O-POST PROCESS   Final Result      No evidence of occlusive lesion or aneurysm.      Multiple tortuous small vessels along the anterior-inferior aspect of the left temporal lobe could represent some type of vascular malformation.      No significant interval change in left-sided holohemispheric subdural hematoma extending along the falx and layering over the tentorium.      CT-CTA NECK WITH & W/O-POST PROCESSING   Final Result      CT angiogram of the neck within normal limits.      CT-HEAD W/O   Final Result         1. Grossly unchanged left hemispheric subdural hematoma, most in the left frontal and temporal region.   2. No new intracranial hemorrhage.                  CT-HEAD W/O   Final Result      1.  Stable large  LEFT hemispheric subdural hematoma with associated mass effect and midline shift.   2.  No significant change from prior exam obtained 2 hours earlier.      CT-HEAD W/O   Final Result      Large LEFT holohemispheric subdural hematoma with associated mass effect and 4 mm LEFT to RIGHT midline shift.      These findings were discussed with EFREM FITZGERALD on 3/12/2021 11:40 AM.      DX-CHEST-PORTABLE (1 VIEW)   Final Result      1.  Hypoinflation, improved from prior.   2.  Minimal RIGHT lung base atelectasis or scar.   3.  Minimal RIGHT pleural effusion.          Assessment and plan    * Subdural hematoma, acute (HCC)  Assessment & Plan  SDH and ICH  CTA & 4 vessel cerebral angiogram negative for AVM  S/p craniotomy on 03/25  Goal SBP < 140 mmHg  Keep sodium normal  Frequent neurochecks  Dexamethasone 3 mg [id number] to continue per NSG  Levetiracetam 500 mg bid for seizure prophylaxis  PT/OT - rehab    History of prosthetic mitral valve  Assessment & Plan  Bioprosthetic. INR goal would be 2.5 - 3.5  Received phytonadione and prothrombin complex.  Holding warfarin.Defer to NSGY for recommendation regarding resumption.      COPD (chronic obstructive pulmonary disease) (HCC)- (present on admission)  Assessment & Plan  Denied any hx of smoking  Never formally diagnosed as per patient, not on any home medications  -continue oxygen supplementation with a goal SpO2 of 90    Gout- (present on admission)  Assessment & Plan  Chronic.  Continue home allopurinol    Type 2 diabetes mellitus without complication, without long-term current use of insulin (HCC)- (present on admission)  Assessment & Plan  Hold metformin in hospital.  Insulin sliding scale  Hold home metformin.  Goal 100-180.  Hypoglycemia protocol.    Atrial fibrillation (HCC)  Assessment & Plan  Likely valvular Af s/p mitral valve replacement on warfarin  -hold for now given s/p craniotomy  For SDH  -will resume when it is appropriate as per neurology.    Valvular  cardiomyopathy (HCC)- (present on admission)  Assessment & Plan  Ok to hold, restart on reassessment    Pacemaker  Assessment & Plan  Currently not mri compatible, will need to assess with reps.    Per chart review patient had pacemaker placed in July 2014 ([employer], model # PM 1110, serial #[id number].  The lead is a [employer], model 2088TC/52.  The lead is serial #[id number].). Per [employer] online manual this device has not been tested for MRI compatibility.    Benign prostatic hyperplasia without lower urinary tract symptoms- (present on admission)  Assessment & Plan  Unclear compliance w meds, monitor for now

## 2021-03-27 NOTE — CARE PLAN
Problem: Knowledge Deficit  Goal: Knowledge of disease process/condition, treatment plan, diagnostic tests, and medications will improve  Outcome: PROGRESSING AS EXPECTED     Problem: Discharge Barriers/Planning  Goal: Patient's continuum of care needs will be met  Outcome: PROGRESSING AS EXPECTED     Problem: Skin Integrity  Goal: Risk for impaired skin integrity will decrease  Outcome: PROGRESSING AS EXPECTED     Problem: Respiratory:  Goal: Respiratory status will improve  Outcome: PROGRESSING AS EXPECTED     Problem: Urinary Elimination:  Goal: Ability to reestablish a normal urinary elimination pattern will improve  Outcome: PROGRESSING AS EXPECTED     Problem: Safety  Goal: Will remain free from injury  Outcome: PROGRESSING AS EXPECTED  Goal: Will remain free from falls  Outcome: PROGRESSING AS EXPECTED     Problem: Pain Management  Goal: Pain level will decrease to patient's comfort goal  Outcome: PROGRESSING AS EXPECTED     Problem: Venous Thromboembolism (VTW)/Deep Vein Thrombosis (DVT) Prevention:  Goal: Patient will participate in Venous Thrombosis (VTE)/Deep Vein Thrombosis (DVT)Prevention Measures  Outcome: PROGRESSING AS EXPECTED     Problem: Medication  Goal: Compliance with prescribed medication will improve  Outcome: PROGRESSING AS EXPECTED      Neuro: L facial droop, slight slurred/mumbled speech, no other deficits  Cardiac: 100% paced, 500cc bolus given for hypotension per Mily SORIA, SBP goal 100-140  Resp: RA  GI: no BM, adequate oral intake  : adequate UOP, condom cath    A line   L hemavac drain 20cc bloody output    Prn morphine 4mg x1 for and oxycodone 5mg x2 for  acute/surgical head pain        zofran 4mg x1 for nausea

## 2021-03-27 NOTE — PROGRESS NOTES
Neurosurgery Progress Note    Subjective:  No events    Exam:  A&O x3, GCS 15  PERRL, EOMI  Right droop and tongue deviation  Left f/s  Right 4-4+/5, +drift  C/d/i      BP  Min: 78/50  Max: 142/82  Pulse  Av.2  Min: 34  Max: 85  Resp  Av.6  Min: 13  Max: 55  Temp  Av.6 °C (97.9 °F)  Min: 36.1 °C (97 °F)  Max: 37 °C (98.6 °F)  SpO2  Av.8 %  Min: 80 %  Max: 98 %    No data recorded    Recent Labs     21  0400 21  0424   WBC 12.7* 12.2* 7.2   RBC 3.90* 3.68* 3.33*   HEMOGLOBIN 12.7* 12.0* 10.9*   HEMATOCRIT 38.3* 36.4* 32.8*   MCV 98.2* 98.9* 98.5*   MCH 32.6 32.6 32.7   MCHC 33.2* 33.0* 33.2*   RDW 59.7* 58.4* 59.1*   PLATELETCT 97* 84* 62*   MPV 10.9 10.7 11.4     Recent Labs     210 21  0424   SODIUM 132* 127* 129*   POTASSIUM 5.0 4.7 5.0   CHLORIDE 102 99 100   CO2 23 22 22   GLUCOSE 155* 155* 190*   BUN * 21 21   CREATININE 0.98 0.79 0.83   CALCIUM 8.6 7.9* 8.2*               Intake/Output       21 0700 - 21 0659 21 07 - 21 0659      1900-0659 Total 1900-0659 Total       Intake    P.O.  --  480 480  --  -- --    P.O. -- 480 480 -- -- --    I.V.  998.3  -- 998.3  --  -- --    Volume (mL) (0.9 % NaCl with KCl 20 mEq infusion) 998.3 -- 998.3 -- -- --    IV Piggyback  1100  500 1600  --  -- --    Volume (mL) (lactated ringer BOLUS infusion) 1000 -- 1000 -- -- --    Volume (mL) (NS (BOLUS) infusion 500 mL) -- 500 500 -- -- --    Volume (mL) (ceFAZolin in dextrose (ANCEF) IVPB premix 2 g) 100 -- 100 -- -- --    Total Intake 2098.3 980 3078.3 -- -- --       Output    Urine  300  1975  200  -- 200    Number of Times Voided 1 x -- 1 x -- -- --    Urine Void (mL) 300 -- 300 -- -- --    Output (mL) (External Urinary Catheter (Condom)) -- 1975 200 -- 200    Drains  70  25 95  2  -- 2    Output (mL) (Closed/Suction Drain 1 Left;Ventral Scalp Hemovac 10 Fr.) 70 25 95 2 -- 2    Total Output 370  2000 2370 202 -- 202       Net I/O     1728.3 -1020 708.3 -202 -- -202            Intake/Output Summary (Last 24 hours) at 3/27/2021 0937  Last data filed at 3/27/2021 0800  Gross per 24 hour   Intake 3078.33 ml   Output 2522 ml   Net 556.33 ml            • Pharmacy Consult Request  1 Each PHARMACY TO DOSE   • MD ALERT...DO NOT ADMINISTER NSAIDS or ASPIRIN unless ORDERED By Neurosurgery  1 Each PRN   • ondansetron  4 mg Q4HRS PRN   • artificial tears  1 Application PRN   • sodium chloride  1 g BID WITH MEALS   • insulin regular  2-9 Units 4X/DAY ACHS    And   • glucose  16 g Q15 MIN PRN    And   • dextrose 50%  50 mL Q15 MIN PRN   • insulin glargine  15 Units QAM INSULIN   • dexamethasone  3 mg Q8HRS   • acetaminophen  650 mg Q6HRS   • levETIRAcetam  500 mg BID   • lisinopril  2.5 mg Q DAY   • metoprolol tartrate  25 mg TWICE DAILY   • allopurinol  100 mg QAM   • tamsulosin  0.4 mg DAILY   • oxyCODONE immediate-release  5 mg Q4HRS PRN    Or   • oxyCODONE immediate-release  10 mg Q4HRS PRN   • morphine injection  2-4 mg Q3HRS PRN   • labetalol  10 mg Q4HRS PRN   • senna-docusate  2 tablet BID    And   • polyethylene glycol/lytes  1 Packet QDAY PRN    And   • magnesium hydroxide  30 mL QDAY PRN    And   • bisacodyl  10 mg QDAY PRN   • Respiratory Therapy Consult   Continuous RT   • LORazepam  2 mg Q5 MIN PRN   • hydrALAZINE  10 mg Q4HRS PRN   • enalaprilat  1.25 mg Q6HRS PRN   • prochlorperazine  10 mg Q6HRS PRN       Assessment and Plan:  POD #2 left craniotomy for SDH  Prophylactic anticoagulation: no     Start date/time: 7 days post op  Neuro to q 4  Up to chair and advance diet  Continue Keppra at 500 mg bid.   Keep drain in one more day

## 2021-03-28 PROBLEM — R56.9 SEIZURE (HCC): Status: ACTIVE | Noted: 2021-01-01

## 2021-03-28 NOTE — PROGRESS NOTES
Neurosurgery Progress Note    Subjective:  No events    Exam:  A&O x3, GCS 15  PERRL, EOMI  Right droop and tongue deviation  Left f/s  Right 4+/5, +drift  C/d/i      BP  Min: 100/60  Max: 114/77  Pulse  Av  Min: 79  Max: 80  Resp  Av.3  Min: 10  Max: 48  Temp  Av.8 °C (98.2 °F)  Min: 36.2 °C (97.1 °F)  Max: 37.2 °C (98.9 °F)  SpO2  Av.4 %  Min: 92 %  Max: 98 %    No data recorded    Recent Labs     21  0400 21  0424 21  0535   WBC 12.2* 7.2 6.0   RBC 3.68* 3.33* 3.48*   HEMOGLOBIN 12.0* 10.9* 11.3*   HEMATOCRIT 36.4* 32.8* 34.2*   MCV 98.9* 98.5* 98.3*   MCH 32.6 32.7 32.5   MCHC 33.0* 33.2* 33.0*   RDW 58.4* 59.1* 58.4*   PLATELETCT 84* 62* 66*   MPV 10.7 11.4 12.1     Recent Labs     21  0400 21  0424 21  0535   SODIUM 127* 129* 128*   POTASSIUM 4.7 5.0 4.8   CHLORIDE 99 100 96   CO2 22 22 22   GLUCOSE 155* 190* 159*   BUN 21 21 19   CREATININE 0.79 0.83 0.66   CALCIUM 7.9* 8.2* 8.1*               Intake/Output       21 07 - 21 0659 21 07 - 21 0659       Total  Total       Intake    P.O.  240  120 360  350  -- 350    P.O. 240 120 360 350 -- 350    IV Piggyback  500  -- 500  --  -- --    Volume (mL) (NS (BOLUS) infusion 500 mL) 500 -- 500 -- -- --    Total Intake 740 120 860 350 -- 350       Output    Urine  400  600 1000  --  -- --    Number of Times Voided -- 1 x 1 x -- -- --    Number of Times Incontinent of Urine 1 x -- 1 x -- -- --    Output (mL) (External Urinary Catheter (Condom))  -- -- --    Drains  4  15 19  --  -- --    Output (mL) (Closed/Suction Drain 1 Left;Ventral Scalp Hemovac 10 Fr.) 4 15 19 -- -- --    Stool  --  -- --  --  -- --    Number of Times Stooled 1 x 0 x 1 x 0 x -- 0 x    Total Output  -- -- --       Net I/O     534 -637 -282 350 -- 350            Intake/Output Summary (Last 24 hours) at 3/28/2021 1031  Last data filed at 3/28/2021 1000  Gross  per 24 hour   Intake 1090 ml   Output 817 ml   Net 273 ml            • dexamethasone  2 mg TID    Followed by   • [START ON 4/1/2021] dexamethasone  1 mg TID    Followed by   • [START ON 4/4/2021] dexamethasone  1 mg BID   • sodium chloride  1 g TID WITH MEALS   • metoprolol tartrate  12.5 mg TWICE DAILY   • lisinopril  2.5 mg Q DAY   • artificial tears  1 Application Q2HRS PRN   • Pharmacy Consult Request  1 Each PHARMACY TO DOSE   • MD ALERT...DO NOT ADMINISTER NSAIDS or ASPIRIN unless ORDERED By Neurosurgery  1 Each PRN   • ondansetron  4 mg Q4HRS PRN   • insulin regular  2-9 Units 4X/DAY ACHS    And   • glucose  16 g Q15 MIN PRN    And   • dextrose 50%  50 mL Q15 MIN PRN   • insulin glargine  15 Units QAM INSULIN   • acetaminophen  650 mg Q6HRS   • levETIRAcetam  500 mg BID   • allopurinol  100 mg QAM   • tamsulosin  0.4 mg DAILY   • oxyCODONE immediate-release  5 mg Q4HRS PRN    Or   • oxyCODONE immediate-release  10 mg Q4HRS PRN   • morphine injection  2-4 mg Q3HRS PRN   • labetalol  10 mg Q4HRS PRN   • senna-docusate  2 tablet BID    And   • polyethylene glycol/lytes  1 Packet QDAY PRN    And   • magnesium hydroxide  30 mL QDAY PRN    And   • bisacodyl  10 mg QDAY PRN   • Respiratory Therapy Consult   Continuous RT   • LORazepam  2 mg Q5 MIN PRN   • hydrALAZINE  10 mg Q4HRS PRN   • enalaprilat  1.25 mg Q6HRS PRN   • prochlorperazine  10 mg Q6HRS PRN       Assessment and Plan:  POD #3 left craniotomy for SDH  Prophylactic anticoagulation: no     Start date/time: 7 days post op  Neuro to q 4  Up to chair and advance diet  Continue Keppra at 500 mg bid.   D/c'd drain

## 2021-03-28 NOTE — ASSESSMENT & PLAN NOTE
Secondary to SDH  Witnessed event: pt started with aphasia then gaze went upward and fixed with head tremor.  Pt was then unresponsive.  Episode lasting approx 1 min with rapid return to baseline   2 Szs on 3/28 none since  Cont Keppra 1g BID    4/1: Due to ongoing thrombocytopenia as per hematology Keppra can sometimes cause low platelets, so we have switched to Vimpat as per neurology's recommendation.

## 2021-03-28 NOTE — PROGRESS NOTES
Late entry:    While at lunch, the pt had a witnessed seizure by Dr. Morales. The seizure was reported lasting 30 seconds and was characterized by the patient looking upwards to the left with some rhythmic shoulder involvement.     Dr. Morales and Dr. Pacheco agreed upon giving the pt a loading dose and increasing the maintenance dose of Keppra. Transfer orders cancelled.     Dr. Mckeon was also notified. Orders were given for a STAT head CT.

## 2021-03-28 NOTE — ASSESSMENT & PLAN NOTE
Holding anticoagulation in setting of acute SDH  Rate control with metoprolol  Cont Tele monitoring  Cardiology recommending NOT putting patient back on AC given bleeding risk

## 2021-03-28 NOTE — PROGRESS NOTES
No issues overnight. Drain removed this am. HD stable. NA stable - chronically low. Signed out to hospitalist. Ok for NSG floor.

## 2021-03-28 NOTE — PROGRESS NOTES
Hospital Medicine Daily Progress Note    Date of Service  3/28/2021    Chief Complaint  65 y.o. male admitted 3/12/2021 with altered mentation warfarin, CAD, bioprosthetic MVR, gout, HTN, PPM, DVT/PE, DM.    Friend could not get a hold of pt for 3 days.  EMS called and found pt with R side deficits.  In ED CT demonstrating L wholohemispheric SDH.  INR reversed with KCentra.  Initially treated non operatively however surveilance CT morena 3/22 showed the SDH was enlarging so taken to the OR.  Angiogram on 3/15 neg for vascular abnormalities.    3/25 taken to OR for L craniotomy and evacuation by Dr Hudson  3/28 witnessed Sz    Hospital Course  66yo PMHx ETOH, AFIb, anticoagulation with    Interval Problem Update  Pt c/o HA which he thinks is unchanged from yesterday.  Has no other complaints on my exam    Consultants/Specialty  Neuro Srgry    Code Status  Full Code    Disposition      Review of Systems  Review of Systems   Constitutional: Negative for chills and fever.   HENT: Negative for nosebleeds and sore throat.    Eyes: Negative for blurred vision and double vision.   Respiratory: Negative for cough and shortness of breath.    Cardiovascular: Negative for chest pain, palpitations and leg swelling.   Gastrointestinal: Negative for abdominal pain, diarrhea, nausea and vomiting.   Genitourinary: Negative for dysuria and urgency.   Musculoskeletal: Negative for back pain.   Skin: Negative for rash.   Neurological: Positive for weakness and headaches. Negative for dizziness and loss of consciousness.        Physical Exam  Temp:  [36.7 °C (98 °F)-37.2 °C (98.9 °F)] 37.1 °C (98.8 °F)  Pulse:  [79-80] 80  Resp:  [10-48] 22  BP: (101-114)/(68-78) 114/77  SpO2:  [92 %-98 %] 96 %    Physical Exam  Vitals reviewed.   Constitutional:       General: He is not in acute distress.     Appearance: Normal appearance. He is well-developed. He is not diaphoretic.   HENT:      Head: Normocephalic and atraumatic.      Comments: L scalp  incision noted.  No signs of infection  Eyes:      Conjunctiva/sclera: Conjunctivae normal.   Neck:      Vascular: No JVD.   Cardiovascular:      Rate and Rhythm: Normal rate.      Heart sounds: No murmur. No gallop.    Pulmonary:      Effort: Pulmonary effort is normal. No respiratory distress.      Breath sounds: No stridor. No wheezing or rales.   Abdominal:      Palpations: Abdomen is soft.      Tenderness: There is no abdominal tenderness. There is no guarding or rebound.   Musculoskeletal:         General: No tenderness.      Right lower leg: No edema.      Left lower leg: No edema.   Skin:     General: Skin is warm and dry.      Coloration: Skin is pale.      Findings: No rash.   Neurological:      General: No focal deficit present.      Mental Status: He is alert and oriented to person, place, and time.   Psychiatric:         Thought Content: Thought content normal.         Fluids    Intake/Output Summary (Last 24 hours) at 3/28/2021 1222  Last data filed at 3/28/2021 1000  Gross per 24 hour   Intake 590 ml   Output 615 ml   Net -25 ml       Laboratory  Recent Labs     03/26/21  0400 03/27/21  0424 03/28/21  0535   WBC 12.2* 7.2 6.0   RBC 3.68* 3.33* 3.48*   HEMOGLOBIN 12.0* 10.9* 11.3*   HEMATOCRIT 36.4* 32.8* 34.2*   MCV 98.9* 98.5* 98.3*   MCH 32.6 32.7 32.5   MCHC 33.0* 33.2* 33.0*   RDW 58.4* 59.1* 58.4*   PLATELETCT 84* 62* 66*   MPV 10.7 11.4 12.1     Recent Labs     03/26/21  0400 03/27/21  0424 03/28/21  0535   SODIUM 127* 129* 128*   POTASSIUM 4.7 5.0 4.8   CHLORIDE 99 100 96   CO2 22 22 22   GLUCOSE 155* 190* 159*   BUN 21 21 19   CREATININE 0.79 0.83 0.66   CALCIUM 7.9* 8.2* 8.1*                   Imaging  CT-HEAD W/O   Final Result      Interval left craniotomy with overall decrease in volume of subdural hematoma with some postsurgical extra-axial hemorrhage deep to the craniotomy flap with associated subarachnoid hemorrhage.      Persistent mass effect and midline shift from left-to-right of  approximately 5 mm.      CT-HEAD W/O   Final Result      Left extra-axial hematoma is again noted. Subacute blood closer to the vertex along the left frontoparietal lobes is mildly increased compared to prior measuring 1.2 compared to 1 cm.      Mass effect is again noted with 4.6 mm midline shift to the right, slightly increased compared to prior.      CT-ABDOMEN-PELVIS WITH   Final Result      1.  Ascites is no longer identified.      2.  Liver surface is mildly nodular consistent with cirrhosis.      3.  Splenomegaly is again noted. Punctate focus of decreased attenuation in the spleen could be due to normal differential enhancement although small hemangioma is also possible.      4.  Ventral abdominal wall hernia containing abdominal fat is again identified.      5.  Consolidation and volume loss is noted posteriorly in the right lower lobe which could indicate pneumonia.      6.  Diverticulosis.         DX-CHEST-PORTABLE (1 VIEW)   Final Result      Small right pleural effusion.      Right basilar opacities may represent atelectasis or scarring.         CT-HEAD W/O   Final Result      1.  Stable size of the extra-axial hemorrhage overlying the left cerebral hemisphere with sulci effacement and approximately 3 mm of left-to-right midline shift.         IR-CAROTID-CEREBRAL BILATERAL   Final Result         1.  Normal cerebral arteriogram.      2.  No evidence of arteriovenous malformation or intracranial aneurysm formation.      3.  No evidence of atherosclerotic disease in the right common femoral artery.      CT-CTA HEAD WITH & W/O-POST PROCESS   Final Result      No evidence of occlusive lesion or aneurysm.      Multiple tortuous small vessels along the anterior-inferior aspect of the left temporal lobe could represent some type of vascular malformation.      No significant interval change in left-sided holohemispheric subdural hematoma extending along the falx and layering over the tentorium.      CT-CTA NECK  WITH & W/O-POST PROCESSING   Final Result      CT angiogram of the neck within normal limits.      CT-HEAD W/O   Final Result         1. Grossly unchanged left hemispheric subdural hematoma, most in the left frontal and temporal region.   2. No new intracranial hemorrhage.                  CT-HEAD W/O   Final Result      1.  Stable large LEFT hemispheric subdural hematoma with associated mass effect and midline shift.   2.  No significant change from prior exam obtained 2 hours earlier.      CT-HEAD W/O   Final Result      Large LEFT holohemispheric subdural hematoma with associated mass effect and 4 mm LEFT to RIGHT midline shift.      These findings were discussed with EFREM FITZGERALD on 3/12/2021 11:40 AM.      DX-CHEST-PORTABLE (1 VIEW)   Final Result      1.  Hypoinflation, improved from prior.   2.  Minimal RIGHT lung base atelectasis or scar.   3.  Minimal RIGHT pleural effusion.      CT-HEAD W/O    (Results Pending)        Assessment/Plan  * Subdural hematoma, acute (HCC)  Assessment & Plan  Continues to have headache  Neurosurgery following and recommending continuing steroids and Keppra for seizure prophylaxis. On steroid taper  Repeat CT scan 3/22: 1.2cm extra axial hematoma (from 1cm) with 4.6mm midline shift to right slightly greater compared to prior imaging  Neurosurgery following  PT/OT/SLP following, continue therapy    S/p craniotomy 3/25  Cont Neuro checks  BP control  Sz prophylaxis  PT/OT/SLP      History of prosthetic mitral valve  Assessment & Plan  With paroxysmal atrial fibrillation  Anticoagulation reversed given subdural hematoma  Resume anticoagulation when bleeding risk felt to be acceptable by neurosurgery    COPD (chronic obstructive pulmonary disease) (HCC)- (present on admission)  Assessment & Plan  Patient not in acute exacerbation or requiring oxygen  O2/RT protocols    Gout- (present on admission)  Assessment & Plan  Continue allopurinol    Type 2 diabetes mellitus without  complication, without long-term current use of insulin (HCC)- (present on admission)  Assessment & Plan  Patient with hyperglycemia worsened by steroids  A1C 5.9  lantus 15 units  SSI        Atrial fibrillation (HCC)  Assessment & Plan  Holding anticoagulation in setting of acute SDH  Rate control with metoprolol  Cont Tele monitoring    Seizure (HCC)  Assessment & Plan  Secondary to SDH  Load keppra and increase maintenance dose to 1g BID  Witnessed event: pt started with aphasia then gaze went upward and fixed with head tremor.  Pt was then unresponsive.  Episode lasting approx 1 min with rapid return to baseline     Thrombocytopenia (HCC)  Assessment & Plan  Likely due to cirrhosis  Continue to monitor    Cognitive deficits  Assessment & Plan  Evaluated by SLP and found to have cognitive deficits, recommending SNF  Cognitive deficits likely secondary to SDH  Discussed with friend, he is not at previous baseline    3/23 Will likely need SNF placement, order placed  CM to assist  Accepted by HH  Noncompliance with pt/ot  Encourage activity    3/24 plan for OR 3/25  snf pending    Alcoholic cirrhosis of liver without ascites (HCC)  Assessment & Plan  Patient reports history of heavy alcohol use, has not drank in few years  Will need GI follow up as outpatient  Denies previous diagnosis of liver disease  Cirrhotic appearing liver on imaging without ascites  INR elevation representing liver dysfunction  Hep panel neg  Ammonia 26    Generalized abdominal pain  Assessment & Plan  No definitive etiology of pain, does have fat containing ventral hernia and diverticulosis without diverticulitis  No evidence of ascites  Continue to monitor  Pain has since resolved    Venous stasis dermatitis of right lower extremity  Assessment & Plan  Does not appear infected on examination  Continue skin care per nursing protocol    Grade IV hemorrhoids  Assessment & Plan  Patient reports chronic hemorrhoids with occasional mild rectal  bleeding  Noted this morning to have prolapsed hemorrhoids, tender with mild bleeding  Will prescribe lidocaine gel for pain relief  Hemorrhoids have improved today      Leukemoid reaction  Assessment & Plan  Patient with acute rise in WBC  No evidence of infection on CXR or UA  WBC trending down and now in normal range    Hyponatremia  Assessment & Plan  Asymptomatic hyponatremia  Hyponatremia worsening 3/22 now down to 126  3/23 stable at 129, cont current management  Likely SIADH  Free water restriction  Due to subdural hematoma will start salt supplementation    Increase NaCl tabs  Cont to follow daily    Valvular cardiomyopathy (HCC)- (present on admission)  Assessment & Plan  History of bioprosthetic mitral valve replacement    Pacemaker  Assessment & Plan  History of pacemaker limiting ability to obtain MRI    Obesity (BMI 30-39.9)- (present on admission)  Assessment & Plan  Will need weight loss program on discharge    Benign prostatic hyperplasia without lower urinary tract symptoms- (present on admission)  Assessment & Plan  Continue Flomax  No evidence of urinary obstruction       VTE prophylaxis: SCDs

## 2021-03-28 NOTE — CARE PLAN
Problem: Discharge Barriers/Planning  Goal: Patient's continuum of care needs will be met  Outcome: PROGRESSING AS EXPECTED     Problem: Skin Integrity  Goal: Risk for impaired skin integrity will decrease  Outcome: PROGRESSING AS EXPECTED     Problem: Respiratory:  Goal: Respiratory status will improve  Outcome: PROGRESSING AS EXPECTED     Problem: Safety  Goal: Will remain free from injury  Outcome: PROGRESSING AS EXPECTED  Goal: Will remain free from falls  Outcome: PROGRESSING AS EXPECTED     Problem: Pain Management  Goal: Pain level will decrease to patient's comfort goal  Outcome: PROGRESSING AS EXPECTED     Problem: Venous Thromboembolism (VTW)/Deep Vein Thrombosis (DVT) Prevention:  Goal: Patient will participate in Venous Thrombosis (VTE)/Deep Vein Thrombosis (DVT)Prevention Measures  Outcome: PROGRESSING AS EXPECTED     Problem: Medication  Goal: Compliance with prescribed medication will improve  Outcome: PROGRESSING AS EXPECTED     Problem: Knowledge Deficit  Goal: Knowledge of disease process/condition, treatment plan, diagnostic tests, and medications will improve  Outcome: PROGRESSING SLOWER THAN EXPECTED     Problem: Urinary Elimination:  Goal: Ability to reestablish a normal urinary elimination pattern will improve  Outcome: PROGRESSING SLOWER THAN EXPECTED

## 2021-03-28 NOTE — PROGRESS NOTES
2 RN skin check complete.     Devices in place: EKG leads and electrodes, O2 probe, BP cuff, & condom cath.     Skin assessed under devices.    No new or potential skin break down. Skin over the sacrum appears to by hyperpigmented/scar tissue; skin blanches- Mepilex applied. Bilateral sacrum, heels, ears are intact without signs of break down. Bilateral lower extremities have old scars and the RLE has a chronic wound.     The following interventions in place: Qshift 2 RN skin check, Q2 hr turns and device repositioning, low airloss mattress, moisturizer, nutrition, pillows to reposition extremities, & mepilex in place.

## 2021-03-29 NOTE — DISCHARGE PLANNING
Hospital Care Management Discharge Planning       Anticipated Discharge Disposition:   · To be determined      Action:   · This RN CM reached out to YULIANA Carolina at [facility], to follow up on Physiatry consult as it was charted pending on 3/13  · Ge states he will follow up on consult       Barriers to Discharge:   · Post acute placement   · Medical clearance      Plan:   · F/U with [facility]/ Physiatrist   · Hospital Care Management Team to continue to provide support services and assistance with discharge planning as needed.

## 2021-03-29 NOTE — THERAPY
"Physical Therapy   Daily Treatment     Patient Name: Morales Olivier  Age:  65 y.o., Sex:  male  Medical Record #: [id number]  Today's Date: 3/29/2021     Precautions: Fall Risk, Swallow Precautions ( See Comments)    Assessment    Patient seen following transfer to higher level of care after witnessed seizure 3/28. He required extra time and encouragement for mobility but got out of bed and transferred to chair with min A using FWW. He demonstrated difficulty weight shifting and with RLE limb advancement during stand step transfer. Will continue to follow.    Plan    Continue current treatment plan.    DC Equipment Recommendations: Unable to determine at this time, Front-Wheel Walker  Discharge Recommendations: Recommend post-acute placement for additional physical therapy services prior to discharge home      Subjective    \"I feel bad.\"  \"Sorry for being a burden.\"     Objective       03/29/21 1148   Total Time Spent   Total Time Spent (Mins) 25   Charge Group   Charges  Yes   PT Therapeutic Activities 1   PT Neuromuscular Re-Education / Balance 1   Precautions   Precautions Fall Risk;Swallow Precautions ( See Comments)   Comments SDH s/p crani; seizure 3/28   Vitals   O2 (LPM) 0   O2 Delivery Device None - Room Air   Pain 0 - 10 Group   Location Generalized   Therapist Pain Assessment During Activity;Post Activity Pain Same as Prior to Activity;Nurse Notified   Cognition    Cognition / Consciousness X   Speech/ Communication Delayed Responses;Slurred   Level of Consciousness Lethargic  (improved somewhat with OOB activity)   Ability To Follow Commands 1 Step   Safety Awareness Impaired   New Learning Impaired   Attention Impaired   Comments gross delay with extra time required. required encouragement but cooperative   Active ROM Lower Body    Active ROM Lower Body  X   Comments decreased R hip flexion for RLE limb advancement   Strength Lower Body   Lower Body Strength  X   Comments observed RLE weakness but " functional for stand step transfer   Balance   Sitting Balance (Static) Fair   Sitting Balance (Dynamic) Fair   Standing Balance (Static) Fair -   Standing Balance (Dynamic) Fair -   Weight Shift Sitting Fair   Weight Shift Standing Fair   Skilled Intervention Compensatory Strategies;Sequencing;Tactile Cuing;Verbal Cuing   Comments SBA sitting EOB and standing with FWW   Gait Analysis   Gait Level Of Assist Unable to Participate  (deferred given patient somewhat lethargic)   Weight Bearing Status no restrictions   Vision Deficits Impacting Mobility NT but patient appeared to have difficulty with R eye opening   Bed Mobility    Supine to Sit Minimal Assist   Sit to Supine   (NT, left in chair)   Scooting Minimal Assist  (seated, extra time)   Skilled Intervention Compensatory Strategies;Sequencing;Tactile Cuing;Verbal Cuing   Functional Mobility   Sit to Stand Minimal Assist  (CGA)   Bed, Chair, Wheelchair Transfer Minimal Assist   Transfer Method Stand Step   Skilled Intervention Compensatory Strategies;Sequencing;Tactile Cuing;Verbal Cuing   ICU Target Mobility Level   ICU Mobility - Targeted Level Level 4   How much difficulty does the patient currently have...   Turning over in bed (including adjusting bedclothes, sheets and blankets)? 3   Sitting down on and standing up from a chair with arms (e.g., wheelchair, bedside commode, etc.) 3   Moving from lying on back to sitting on the side of the bed? 3   How much help from another person does the patient currently need...   Moving to and from a bed to a chair (including a wheelchair)? 3   Need to walk in a hospital room? 3   Climbing 3-5 steps with a railing? 2   6 clicks Mobility Score 17   Activity Tolerance   Sitting in Chair left in chair   Sitting Edge of Bed 8 min   Standing 5 min   Patient / Family Goals    Patient / Family Goal #1 feel better   Goal #1 Outcome Goal not met   Short Term Goals    Short Term Goal # 1 Patient will move supine<>sitting EOB with  supervision within 6tx in order to get in/out of bed   Goal Outcome # 1 goal not met   Short Term Goal # 2 Patient will move sitting<>standing with supervision within 6tx in order to initiate gait and transfers   Goal Outcome # 2 Goal not met   Short Term Goal # 3 Patient will ambulate 50ft with supervision within 6tx in order to access environment   Goal Outcome # 3 Goal not met   Anticipated Discharge Equipment and Recommendations   DC Equipment Recommendations Unable to determine at this time;Front-Wheel Walker   Discharge Recommendations Recommend post-acute placement for additional physical therapy services prior to discharge home   Interdisciplinary Plan of Care Collaboration   IDT Collaboration with  Nursing   Patient Position at End of Therapy Seated;Chair Alarm On;Call Light within Reach;Tray Table within Reach   Collaboration Comments RN aware of visit, response   Session Information   Date / Session Number  3/29-5 (1/3, 4/4)   Priority 2

## 2021-03-29 NOTE — THERAPY
Speech Language Pathology  Daily Treatment     Patient Name: Morales Olivier  Age:  65 y.o., Sex:  male  Medical Record #: [id number]  Today's Date: 3/29/2021     Precautions  Precautions: (P) Fall Risk, Swallow Precautions ( See Comments)  Comments: s/p crani    Assessment    Pt sleeping, but woke easily, reporting he did not feel well and with complaints of HA.  RN reports some pocketing and increased lethargy s/p seizure yesterday.  Pt was seen with SB6/TN0 (soft and bite sized with thins) meal tray.  Pt attempting to self feed, with hand over hand needed intermittently.  He had prolonged mastication with soft bite sized solids with significant pocketing noted on right side.  Despite max cues to clear residue using tongue/finger sweep, as well as liquid wash, pt unable to completely clear residue.  Pt also noted to have significant throat clearing intermittently with soft solids, concerning for possible aspiration or pharyngeal residue. Pt was also given trials of minced and moist solids, purees and liquidized textures, which he ate without s/sx of aspiration and with only trace oral residue noted.  Pt observed eating a pill floated in applesauce without difficulty. At this time, recommend a downgrade to minced and moist solids with thins, with direct assistance.  SLP continues to follow.    Plan  Downgrade diet to minced and moist solids with thins, with direct assistance (MM5/TN0)     Continue current treatment plan.    Discharge Recommendations: Recommend post-acute placement for additional speech therapy services prior to discharge home      Objective     03/29/21 1001   Speech / Dysarthria   Comments Moderate dysarthria   Voice   Comments reduced in volume   Dysphagia    Dysphagia X   Positioning / Behavior Modification Cough / Clear after Swallow;Modulate Rate or Bite Size;Multiple Swallows;Alternate Solids and Liquids   Diet / Liquid Recommendation Minced & Moist (5) - (Dysphagia II);Thin (0)   Nutritional  Liquid Intake Rating Scale Non thickened beverages   Nutritional Food Intake Rating Scale Total oral diet with multiple consistencies but requiring special preparation or compensations   Nursing Communication Swallow Precaution Sign Posted at Head of Bed   Skilled Intervention Compensatory Strategies;Verbal Cueing   Recommended Route of Medication Administration   Medication Administration  Float Whole with Puree

## 2021-03-29 NOTE — CONSULTS
Physical Medicine and Rehabilitation Consultation         Initial Consult      Initial Consultation Date: 3/29/2021  Consulting provider: Dr. Gabriel Clarke   Reason for consultation: assess for acute inpatient rehab appropriateness  LOS: 17 Day(s)      Chief complaint: SDH      HPI:   The patient is a 65 y.o. male with a past medical history of AFib/bioprosthetic MVR on warfarin and PPM, CAD, COPD, gout, HTN, obesity, hx of DVT/PE, DM2 ;  who presented on 3/12/2021  9:35 AM after found down by a friend who had not heard from the patient for 3 days. Had right hemiplegia and CT showed left holohemispheric SDH. Reversed with Kcentra, initially managed non operatively, but s/p left craniotomy for SDH evacuation on 3/25 with Dr. Naveed Hudson for “progressively enlarging subacute subdural hematoma”; witnessed seizure on 3/28 on Keppra and PRN Ativan. CT head stable on 3/28.         Social Hx:  Pre-morbidly, this patient lived in:   1 story home  With 0 steps to enter  Lives independently   Neighbor Gerald may be able to assist      Current level of function:   PT, 3/26: CGA FWW x20 ft; CGA with transfers; Mod I bed mobility   OT, 3/26: Min A for transfers; Max A for LBD; Min A for toileting   SLP, 3/29: dysphagia; minced and moist solids, thin liquids; direct assist  SLP, 3/20: mod cog impairment in orientation, attention, comprehension, naming, repetition, and similarities; severe impairment in problem solving, safety awareness, reasoning skills         PMH:  Past Medical History:   Diagnosis Date   • Atrial fibrillation (HCC)    • Back pain    • Bronchitis    • CAD (coronary artery disease)    • COPD (chronic obstructive pulmonary disease) (HCC)    • Dehydration 10/22/2019   • Gout    • Heart valve disease     mitral valve replacement (bovine)   • Hypertension    • Kidney stone    • Obesity    • Open wound 9/2/2009   • Pacemaker    • Personal history of venous thrombosis and embolism 2009    DVT right leg   • PVC  (premature ventricular contraction) 4/13/2019   • Renal disorder     kidney stones   • Type II or unspecified type diabetes mellitus without mention of complication, not stated as uncontrolled     DM type II- diet controlled         PSH:  Past Surgical History:   Procedure Laterality Date   • CRANIOTOMY Left 3/25/2021    Procedure: CRANIOTOMY - FOR SUBDURAL.;  Surgeon: Naveed Hudson M.D.;  Location: SURGERY [facility];  Service: Neurosurgery   • MITRAL VALVE REPLACE  3/8/2017    Procedure: MITRAL VALVE REPLACE-REDO;  Surgeon: Cornelia Wright M.D.;  Location: SURGERY [facility];  Service:    • KD  3/8/2017    Procedure: KD;  Surgeon: Cornelia Wright M.D.;  Location: SURGERY [facility];  Service:    • IRRIGATION & DEBRIDEMENT GENERAL  7/20/2009    Performed by MICHEL URBINA at [facility]   • WIDE EXCISION  7/20/2009    Performed by MICHEL URBINA at [facility]   • ANGIOGRAM  7/4/2009    Performed by MICHEL URBINA at [facility]   • CATH REMOVAL  7/4/2009    Performed by MICHEL URBINA at SURGERY [facility]   • THROMBECTOMY  7/4/2009    Performed by MICHEL URBINA at [facility]   • EMBOLECTOMY  7/4/2009    Performed by MICHEL URBINA at [facility]   • ANGIOGRAM  7/3/2009    Performed by MORGAN WARD at SURGERY [facility]   • MITRAL VALVE REPLACE  3/14/08    Performed by CORNELIA WRIGHT at [facility]   • MAZE PROCEDURE  3/14/08    Performed by CORNELIA WRIGHT at [facility]   • OTHER CARDIAC SURGERY  2008    mitral valve replacement   • AORTIC VALVE REPLACEMENT     • OTHER ABDOMINAL SURGERY      imbulica repair          FHX: Reviewed.  Family History   Problem Relation Age of Onset   • Diabetes Mother    • Lung Disease Father    • Heart Disease Neg Hx              Medications:  Current Facility-Administered Medications   Medication Dose   • fludrocortisone (FLORINEF) tablet 0.1 mg   0.1 mg   • dexamethasone (DECADRON) tablet 2 mg  2 mg    Followed by   • [START ON 4/1/2021] dexamethasone (DECADRON) tablet 1 mg  1 mg    Followed by   • [START ON 4/4/2021] dexamethasone (DECADRON) tablet 1 mg  1 mg   • levETIRAcetam (KEPPRA) tablet 1,000 mg  1,000 mg   • sodium chloride (SALT) tablet 1 g  1 g   • metoprolol tartrate (LOPRESSOR) tablet 12.5 mg  12.5 mg   • lisinopril (PRINIVIL) tablet 2.5 mg  2.5 mg   • artificial tears (EYE LUBRICANT) ophth ointment 1 Application  1 Application   • Pharmacy Consult Request ...Pain Management Review 1 Each  1 Each   • MD ALERT...DO NOT ADMINISTER NSAIDS or ASPIRIN unless ORDERED By Neurosurgery 1 Each  1 Each   • ondansetron (ZOFRAN) syringe/vial injection 4 mg  4 mg   • insulin regular (HumuLIN R,NovoLIN R) injection  2-9 Units    And   • glucose 4 g chewable tablet 16 g  16 g    And   • dextrose 50% (D50W) injection 50 mL  50 mL   • insulin glargine (Lantus) injection  15 Units   • acetaminophen (Tylenol) tablet 650 mg  650 mg   • allopurinol (ZYLOPRIM) tablet 100 mg  100 mg   • tamsulosin (FLOMAX) capsule 0.4 mg  0.4 mg   • oxyCODONE immediate-release (ROXICODONE) tablet 5 mg  5 mg    Or   • oxyCODONE immediate release (ROXICODONE) tablet 10 mg  10 mg   • morphine (pf) 4 mg/mL injection 2-4 mg  2-4 mg   • labetalol (NORMODYNE/TRANDATE) injection 10 mg  10 mg   • senna-docusate (PERICOLACE or SENOKOT S) 8.6-50 MG per tablet 2 tablet  2 tablet    And   • polyethylene glycol/lytes (MIRALAX) PACKET 1 Packet  1 Packet    And   • magnesium hydroxide (MILK OF MAGNESIA) suspension 30 mL  30 mL    And   • bisacodyl (DULCOLAX) suppository 10 mg  10 mg   • Respiratory Therapy Consult     • LORazepam (ATIVAN) injection 2 mg  2 mg   • hydrALAZINE (APRESOLINE) injection 10 mg  10 mg   • enalaprilat (VASOTEC) injection 1.25 mg  1.25 mg   • prochlorperazine (COMPAZINE) injection 10 mg  10 mg       Allergies:  No Known Allergies      Vitals: /78   Pulse 80   Temp 36.9  "°C (98.5 °F) (Temporal)   Resp 16   Ht 1.778 m (5' 10\")   Wt 95 kg (209 lb 7 oz)   SpO2 95%           Labs: Reviewed and significant for 3/29: Hgb 12, Na 130, K 4.9, Cr 0.69  Recent Labs     03/27/21  0424 03/28/21  0535 03/29/21  0445   RBC 3.33* 3.48* 3.62*   HEMOGLOBIN 10.9* 11.3* 12.0*   HEMATOCRIT 32.8* 34.2* 35.7*   PLATELETCT 62* 66* 68*     Recent Labs     03/27/21  0424 03/28/21  0535 03/29/21  0445   SODIUM 129* 128* 130*   POTASSIUM 5.0 4.8 4.9   CHLORIDE 100 96 97   CO2 22 22 25   GLUCOSE 190* 159* 121*   BUN 21 19 21   CREATININE 0.83 0.66 0.69   CALCIUM 8.2* 8.1* 8.5     Recent Results (from the past 24 hour(s))   ACCU-CHEK GLUCOSE    Collection Time: 03/28/21  1:20 PM   Result Value Ref Range    Glucose - Accu-Ck 150 (H) 65 - 99 mg/dL   ACCU-CHEK GLUCOSE    Collection Time: 03/28/21  8:50 PM   Result Value Ref Range    Glucose - Accu-Ck 155 (H) 65 - 99 mg/dL   CBC WITH DIFFERENTIAL    Collection Time: 03/29/21  4:45 AM   Result Value Ref Range    WBC 6.6 4.8 - 10.8 K/uL    RBC 3.62 (L) 4.70 - 6.10 M/uL    Hemoglobin 12.0 (L) 14.0 - 18.0 g/dL    Hematocrit 35.7 (L) 42.0 - 52.0 %    MCV 98.6 (H) 81.4 - 97.8 fL    MCH 33.1 (H) 27.0 - 33.0 pg    MCHC 33.6 (L) 33.7 - 35.3 g/dL    RDW 58.2 (H) 35.9 - 50.0 fL    Platelet Count 68 (L) 164 - 446 K/uL    MPV 11.9 9.0 - 12.9 fL    Neutrophils-Polys 84.60 (H) 44.00 - 72.00 %    Lymphocytes 6.60 (L) 22.00 - 41.00 %    Monocytes 6.60 0.00 - 13.40 %    Eosinophils 0.80 0.00 - 6.90 %    Basophils 0.00 0.00 - 1.80 %    Immature Granulocytes 1.40 (H) 0.00 - 0.90 %    Nucleated RBC 0.00 /100 WBC    Neutrophils (Absolute) 5.60 1.82 - 7.42 K/uL    Lymphs (Absolute) 0.44 (L) 1.00 - 4.80 K/uL    Monos (Absolute) 0.44 0.00 - 0.85 K/uL    Eos (Absolute) 0.05 0.00 - 0.51 K/uL    Baso (Absolute) 0.00 0.00 - 0.12 K/uL    Immature Granulocytes (abs) 0.09 0.00 - 0.11 K/uL    NRBC (Absolute) 0.00 K/uL   Basic Metabolic Panel    Collection Time: 03/29/21  4:45 AM   Result Value " Ref Range    Sodium 130 (L) 135 - 145 mmol/L    Potassium 4.9 3.6 - 5.5 mmol/L    Chloride 97 96 - 112 mmol/L    Co2 25 20 - 33 mmol/L    Glucose 121 (H) 65 - 99 mg/dL    Bun 21 8 - 22 mg/dL    Creatinine 0.69 0.50 - 1.40 mg/dL    Calcium 8.5 8.5 - 10.5 mg/dL    Anion Gap 8.0 7.0 - 16.0   ESTIMATED GFR    Collection Time: 03/29/21  4:45 AM   Result Value Ref Range    GFR If African American >60 >60 mL/min/1.73 m 2    GFR If Non African American >60 >60 mL/min/1.73 m 2           Imaging:  CT-HEAD W/O  Result Date: 3/28/2021   CT HEAD WITHOUT CONTRAST 3/28/2021 12:47 PM HISTORY/REASON FOR EXAM:  Seizures TECHNIQUE/EXAM DESCRIPTION AND NUMBER OF VIEWS: CT of the head without contrast. The study was performed on a helical multidetector CT scanner. Contiguous 2.5 mm axial sections were obtained from the skull base through the vertex. Up to date radiation dose reduction adjustments have been utilized to meet ALARA standards for radiation dose reduction. COMPARISON:  3/26/2021 FINDINGS: Recent left craniotomy. Skin staples. Stable amount of extra-axial hemorrhage along the left frontal lobe. Both subdural and subarachnoid hemorrhage is present. Subdural hemorrhage measures up to 1.1 cm in greatest coronal thickness. Small amount of extra-axial air. Overall, no change since prior study. A 4 mm left-to-right midline shift, also stable since prior study. Atrophy. Chronic small vessel ischemic changes. Mild edema of the left frontal lobe, similar to prior. Visualized orbits are unremarkable. Visualized mastoid air cells are clear. No significant sinus disease in the visualized paranasal sinuses.     1.  Stable sequela of left craniotomy, with stable left-sided subarachnoid and subdural hemorrhage. 2.  Residual 4 mm left-to-right midline shift. 3.  No CT evidence of new hemorrhage or large vascular territory infarct.      CT-HEAD W/O  Result Date: 3/26/2021  3/26/2021 3:01 AM HISTORY/REASON FOR EXAM:  Subdural hemorrhage,  follow-up; Post Surgical. TECHNIQUE/EXAM DESCRIPTION AND NUMBER OF VIEWS: CT of the head without contrast. The study was performed on a helical multidetector CT scanner. Contiguous axial sections were obtained from the skull base through the vertex. Up to date radiation dose reduction adjustments have been utilized to meet ALARA standards for radiation dose reduction. COMPARISON:  March 22, 2021 FINDINGS: There is been interval left frontoparietal craniotomy. There is been interval decrease in volume of left-sided subdural hematoma. There is some postsurgical hemorrhage deep to the craniotomy flap measuring up to 10 mm in width with subarachnoid extension. There is again mass effect and midline shift from left-to-right of approximately 5 mm. The basilar cisterns are patent. Paranasal sinuses in the field of view are unremarkable. Mastoids in the field of view are unremarkable.     Interval left craniotomy with overall decrease in volume of subdural hematoma with some postsurgical extra-axial hemorrhage deep to the craniotomy flap with associated subarachnoid hemorrhage. Persistent mass effect and midline shift from left-to-right of approximately 5 mm.      CT-HEAD W/O  Result Date: 3/22/2021  3/22/2021 12:31 AM HISTORY/REASON FOR EXAM:  Hemorrhage surveillance. TECHNIQUE/EXAM DESCRIPTION AND NUMBER OF VIEWS: CT of the head without contrast. Contiguous axial sections were obtained from the skull base through the vertex. Up to date radiation dose reduction adjustments have been utilized to meet ALARA standards for radiation dose reduction. COMPARISON:  3/18/2021 FINDINGS: Extra-axial hemorrhage is again seen on the left. More dense blood is again seen along the left temporal lobe and inferior left frontal lobes. Subacute hemorrhage is seen closer to the vertex along the left frontoparietal lobes which is mildly increased in size measuring 1.2 cm compared to 1 cm previously. There is mass effect on underlying brain  parenchyma with effacement of cortical sulci. There is 4.6 mm midline shift to the right. Visualized paranasal sinuses and mastoid air cells are clear.. Calvarium is intact.     Left extra-axial hematoma is again noted. Subacute blood closer to the vertex along the left frontoparietal lobes is mildly increased compared to prior measuring 1.2 compared to 1 cm. Mass effect is again noted with 4.6 mm midline shift to the right, slightly increased compared to prior.      CT-HEAD W/O  Result Date: 3/18/2021  3/18/2021 10:12 AM HISTORY/REASON FOR EXAM:  Headache, intracranial hemorrhage suspected. TECHNIQUE/EXAM DESCRIPTION AND NUMBER OF VIEWS: CT of the head without contrast. Up to date radiation dose reduction adjustments have been utilized to meet ALARA standards for radiation dose reduction. COMPARISON:  3/13/2020 FINDINGS: Extensive extra-axial hemorrhage overlying the left cerebral hemisphere is similar to the prior exam. Density appears slightly decreased. Hemorrhage extends over the tentorium and inferior to the frontal and temporal lobes. Hemorrhage extends along the falx. Thickness measures approximately 7 mm anterior to the left frontal lobe, unchanged. There is associated sulci effacement. There is approximately 3 mm of left-to-right midline shift. Ventricular size is stable. The paranasal sinuses and mastoid air cells are clear.     1.  Stable size of the extra-axial hemorrhage overlying the left cerebral hemisphere with sulci effacement and approximately 3 mm of left-to-right midline shift.       CT-HEAD W/O  Result Date: 3/12/2021  3/12/2021 1:15 PM HISTORY/REASON FOR EXAM:  Subdural hematoma, follow-up. TECHNIQUE/EXAM DESCRIPTION AND NUMBER OF VIEWS: CT of the head without contrast. The study was performed on a helical multidetector CT scanner. Contiguous 2.5 mm axial sections were obtained from the skull base through the vertex. Up to date radiation dose reduction adjustments have been utilized to meet  ALARA standards for radiation dose reduction. COMPARISON:  3/12/2021 11:27 AM FINDINGS: LEFT hemispheric subdural hematoma again present, unchanged in size.  Effacement of LEFT hemispheric sulci again seen as well as mild effacement of LEFT lateral ventricle. Mild LEFT-to-RIGHT midline shift, unchanged. The basal cisterns are patent.     1.  Stable large LEFT hemispheric subdural hematoma with associated mass effect and midline shift. 2.  No significant change from prior exam obtained 2 hours earlier.      CT-HEAD W/O  Result Date: 3/12/2021  3/12/2021 11:18 AM HISTORY/REASON FOR EXAM:  Altered mental status. RIGHT-sided deficit. TECHNIQUE/EXAM DESCRIPTION AND NUMBER OF VIEWS: CT of the head without contrast. The study was performed on a helical multidetector CT scanner. Contiguous 2.5 mm axial sections were obtained from the skull base through the vertex. Up to date radiation dose reduction adjustments have been utilized to meet ALARA standards for radiation dose reduction. COMPARISON:  11/20/2020 FINDINGS: LEFT holohemispheric acute subdural hematoma measuring up to 2 cm in thickness, most prominent in the frontal region.  Hemorrhage extends into the interhemispheric fissure. Margins of hematoma appears somewhat lobular. Mild effacement of LEFT lateral ventricle. LEFT to RIGHT midline shift measuring 4 mm. Effacement of LEFT hemispheric sulci. The basal cisterns are patent. The calvaria are intact. The visualized orbits are unremarkable. The visualized mastoids are unremarkable. The visualized paranasal sinuses are clear.     Large LEFT holohemispheric subdural hematoma with associated mass effect and 4 mm LEFT to RIGHT midline shift. These findings were discussed with EFREM FITZGERALD on 3/12/2021 11:40 AM.            ASSESSMENT:  Patient is a 65 y.o. male admitted with left holohemispheric subdural hematoma status post evacuation on 3/25 with Dr. PATRICIA Hudson.  Hospital course with seizure on 3/28, hyponatremia,  thrombocytopenia, and alcoholic cirrhosis of liver.      #Rehab:   -Vitals: stable, RA   -Insurance: Medicare/Medicaid   -Discharge support: ky Duarte  -Rehab Impairment Code: 0002.22 - Brain Dysfunction: Traumatic, Closed Injury (Subdural hematoma likely from a fall)  -Reason for admission: Subdural hematoma, acute (HCC)  -Due to mod-severe cognitive impairment and limited discharge support at this time, recommend SNF when medically cleared.   -If patient can establish 24/7 supervision on discharge, he would be a candidate for rehab.     #Neuro: left frontal IPH and SDH s/p arteriogram on 3/15, s/p left craniotomy for SDH evacuation on 3/25 with Dr. Naveed Hudson for “progressively enlarging subacute subdural hematoma”; witnessed seizure on 3/28   -drain removed 3/27  -decadron  -Keppra    -PRN Ativan for seizures   -when medically stable, consider methylphenidate 5 or 10 mg QAM for neurostimulation to assist with neuro recovery; avoid amantadine in setting of recent seizure     #CV: AFib/bioprosthetic MVR on warfarin and PPM (?not MRI compatible per chart review), CAD, HTN   -Lisinopril, metoprolol  -AC on hold     #GI: alcoholic cirrhosis of liver without ascites, hx of heavy alcohol use, last drink few years ago     #Hyponatremia: Na 130 on 3/29 <= 128 on 3/28  -salt tabs 1g TID    #Thrombocytopenia: Plt 68 on 3/29 <= 66 on 3/28  -likely due to cirrhosis, monitor     #Pain: Tylenol, PRN IV morphine, PRN oxycodone     #Gout: allopurinol    #DM: A1C 5.9%; SSI, glargine    #Pulm: hx of COPD, stable      #Bowel: 1x on 3/27, senna s     #Bladder: BPH; condom cath, flomax    #DVT PPX: SCDs; home AC on hold due to SDH           Thank you for allowing us to participate in the care of this patient.             Dwight Saldana MD  Physical Medicine and Rehabilitation   3/29/2021

## 2021-03-29 NOTE — HOSPITAL COURSE
Mr. Olivier is a 65 year old male with PMH significant for Dm2 (on oral agent), gout, ETOH abuse with cirrhosis, valvular cardiomyopathy, bioprosthetic MVR on chronic AC with warfarin, and PPM (2014 not MRI compatible) who presented 3/12/21 with altered mental status. He was recently Dc'd from rehab facility for LLE cellulitis. Apparently a friend had been trying to contact him for 3 days - patient was found altered with right-sided weakness, slurred speech and one word verbal responses.   CT head showed large left holohemispheric subdural hematoma with associated mass effect and 4 mm left to right shift.   INR 2.84 - he received prothrombin complex and phytonadione in ED. He underwent evacuation of SDH on 3/25.     3/28: Head CT stable sequela of left craniotomy, with stable left-sided subarachnoid and subdural hemorrhage; residual 4 mm left-to-right midline shift and no CT evidence of new hemorrhage or large vascular territory infarct.  Witnessed seizure-like activity - looking up to the left with no body involvement, started on Keppra.

## 2021-03-29 NOTE — PROGRESS NOTES
Pt had a witnessed seizure from [id number], 2 mg Ativan given per MAR.     The seizure consisted of him rhythmically looking up to thew left and intermitently to the right. No body involvement.

## 2021-03-29 NOTE — PROGRESS NOTES
[facility] Daily Progress [facility]    Date of Service  3/29/2021    Chief Complaint  65 y.o. male admitted 3/12/2021 with altered mentation warfarin, CAD, bioprosthetic MVR, gout, HTN, PPM, DVT/PE, DM.    Friend could not get a hold of pt for 3 days.  EMS called and found pt with R side deficits.  In ED CT demonstrating L wholohemispheric SDH.  INR reversed with KCentra.  Initially treated non operatively however surveilance CT morena 3/22 showed the SDH was enlarging so taken to the OR.  Angiogram on 3/15 neg for vascular abnormalities.    3/25 taken to OR for L craniotomy and evacuation by Dr Hudson  3/28 witnessed Sz    Hospital Course  66yo PMHx ETOH, AFIb, anticoagulation with    Interval Problem Update  Feels tired today, slept poorly due to noise in unit.  HA is less today  Sz yesterday hs per RN, none since    Consultants/Specialty  Neuro Srgry    Code Status  Full Code    Disposition      Review of Systems  Review of Systems   Constitutional: Negative for chills and fever.   HENT: Negative for nosebleeds and sore throat.    Eyes: Negative for blurred vision and double vision.   Respiratory: Negative for cough and shortness of breath.    Cardiovascular: Negative for chest pain, palpitations and leg swelling.   Gastrointestinal: Negative for abdominal pain, diarrhea, nausea and vomiting.   Genitourinary: Negative for dysuria and urgency.   Musculoskeletal: Negative for back pain.   Skin: Negative for rash.   Neurological: Positive for weakness and headaches. Negative for dizziness and loss of consciousness.        Physical Exam  Temp:  [36.6 °C (97.8 °F)-37.2 °C (99 °F)] 36.9 °C (98.5 °F)  Pulse:  [80-83] 80  Resp:  [12-37] 16  BP: ()/(64-94) 115/78  SpO2:  [93 %-96 %] 95 %    Physical Exam  Vitals reviewed.   Constitutional:       General: He is not in acute distress.     Appearance: Normal appearance. He is well-developed. He is not diaphoretic.   HENT:      Head: Normocephalic and atraumatic.       Comments: L scalp incision noted.  No signs of infection  Eyes:      Conjunctiva/sclera: Conjunctivae normal.   Neck:      Vascular: No JVD.   Cardiovascular:      Rate and Rhythm: Normal rate.      Heart sounds: No murmur. No gallop.    Pulmonary:      Effort: Pulmonary effort is normal. No respiratory distress.      Breath sounds: No stridor. No wheezing or rales.   Abdominal:      Palpations: Abdomen is soft.      Tenderness: There is no abdominal tenderness. There is no guarding or rebound.   Musculoskeletal:         General: No tenderness.      Right lower leg: No edema.      Left lower leg: No edema.   Skin:     General: Skin is warm and dry.      Coloration: Skin is pale.      Findings: No rash.   Neurological:      General: No focal deficit present.      Mental Status: He is alert and oriented to person, place, and time.   Psychiatric:         Thought Content: Thought content normal.         Fluids    Intake/Output Summary (Last 24 hours) at 3/29/2021 1107  Last data filed at 3/29/2021 0600  Gross per 24 hour   Intake 580 ml   Output 2250 ml   Net -1670 ml       Laboratory  Recent Labs     03/27/21  0424 03/28/21  0535 03/29/21  0445   WBC 7.2 6.0 6.6   RBC 3.33* 3.48* 3.62*   HEMOGLOBIN 10.9* 11.3* 12.0*   HEMATOCRIT 32.8* 34.2* 35.7*   MCV 98.5* 98.3* 98.6*   MCH 32.7 32.5 33.1*   MCHC 33.2* 33.0* 33.6*   RDW 59.1* 58.4* 58.2*   PLATELETCT 62* 66* 68*   MPV 11.4 12.1 11.9     Recent Labs     03/27/21  0424 03/28/21  0535 03/29/21  0445   SODIUM 129* 128* 130*   POTASSIUM 5.0 4.8 4.9   CHLORIDE 100 96 97   CO2 22 22 25   GLUCOSE 190* 159* 121*   BUN 21 19 21   CREATININE 0.83 0.66 0.69   CALCIUM 8.2* 8.1* 8.5                   Imaging  CT-HEAD W/O   Final Result      1.  Stable sequela of left craniotomy, with stable left-sided subarachnoid and subdural hemorrhage.   2.  Residual 4 mm left-to-right midline shift.   3.  No CT evidence of new hemorrhage or large vascular territory infarct.      CT-HEAD W/O    Final Result      Interval left craniotomy with overall decrease in volume of subdural hematoma with some postsurgical extra-axial hemorrhage deep to the craniotomy flap with associated subarachnoid hemorrhage.      Persistent mass effect and midline shift from left-to-right of approximately 5 mm.      CT-HEAD W/O   Final Result      Left extra-axial hematoma is again noted. Subacute blood closer to the vertex along the left frontoparietal lobes is mildly increased compared to prior measuring 1.2 compared to 1 cm.      Mass effect is again noted with 4.6 mm midline shift to the right, slightly increased compared to prior.      CT-ABDOMEN-PELVIS WITH   Final Result      1.  Ascites is no longer identified.      2.  Liver surface is mildly nodular consistent with cirrhosis.      3.  Splenomegaly is again noted. Punctate focus of decreased attenuation in the spleen could be due to normal differential enhancement although small hemangioma is also possible.      4.  Ventral abdominal wall hernia containing abdominal fat is again identified.      5.  Consolidation and volume loss is noted posteriorly in the right lower lobe which could indicate pneumonia.      6.  Diverticulosis.         DX-CHEST-PORTABLE (1 VIEW)   Final Result      Small right pleural effusion.      Right basilar opacities may represent atelectasis or scarring.         CT-HEAD W/O   Final Result      1.  Stable size of the extra-axial hemorrhage overlying the left cerebral hemisphere with sulci effacement and approximately 3 mm of left-to-right midline shift.         IR-CAROTID-CEREBRAL BILATERAL   Final Result         1.  Normal cerebral arteriogram.      2.  No evidence of arteriovenous malformation or intracranial aneurysm formation.      3.  No evidence of atherosclerotic disease in the right common femoral artery.      CT-CTA HEAD WITH & W/O-POST PROCESS   Final Result      No evidence of occlusive lesion or aneurysm.      Multiple tortuous small  vessels along the anterior-inferior aspect of the left temporal lobe could represent some type of vascular malformation.      No significant interval change in left-sided holohemispheric subdural hematoma extending along the falx and layering over the tentorium.      CT-CTA NECK WITH & W/O-POST PROCESSING   Final Result      CT angiogram of the neck within normal limits.      CT-HEAD W/O   Final Result         1. Grossly unchanged left hemispheric subdural hematoma, most in the left frontal and temporal region.   2. No new intracranial hemorrhage.                  CT-HEAD W/O   Final Result      1.  Stable large LEFT hemispheric subdural hematoma with associated mass effect and midline shift.   2.  No significant change from prior exam obtained 2 hours earlier.      CT-HEAD W/O   Final Result      Large LEFT holohemispheric subdural hematoma with associated mass effect and 4 mm LEFT to RIGHT midline shift.      These findings were discussed with EFREM FITZGERALD on 3/12/2021 11:40 AM.      DX-CHEST-PORTABLE (1 VIEW)   Final Result      1.  Hypoinflation, improved from prior.   2.  Minimal RIGHT lung base atelectasis or scar.   3.  Minimal RIGHT pleural effusion.           Assessment/Plan  * Subdural hematoma, acute (HCC)  Assessment & Plan  Continues to have headache  Neurosurgery following and recommending continuing steroids and Keppra for seizure prophylaxis. On steroid taper  Repeat CT scan 3/22: 1.2cm extra axial hematoma (from 1cm) with 4.6mm midline shift to right slightly greater compared to prior imaging  Neurosurgery following  PT/OT/SLP following, continue therapy    S/p craniotomy 3/25  Cont Neuro checks  BP control  Sz prophylaxis  PT/OT/SLP      History of prosthetic mitral valve  Assessment & Plan  With paroxysmal atrial fibrillation  Anticoagulation reversed given subdural hematoma  Resume anticoagulation when bleeding risk felt to be acceptable by neurosurgery    COPD (chronic obstructive pulmonary  disease) (HCC)- (present on admission)  Assessment & Plan  Patient not in acute exacerbation or requiring oxygen  O2/RT protocols    Gout- (present on admission)  Assessment & Plan  Continue allopurinol    Type 2 diabetes mellitus without complication, without long-term current use of insulin (HCC)- (present on admission)  Assessment & Plan  Patient with hyperglycemia worsened by steroids  A1C 5.9  lantus 15 units  SSI  Well controled on current regimen however will likely need to cut back lantus as decadron is tapered off        Atrial fibrillation (HCC)  Assessment & Plan  Holding anticoagulation in setting of acute SDH  Rate control with metoprolol  Cont Tele monitoring    Seizure (HCC)  Assessment & Plan  Secondary to SDH  Witnessed event: pt started with aphasia then gaze went upward and fixed with head tremor.  Pt was then unresponsive.  Episode lasting approx 1 min with rapid return to baseline   2 Szs on 3/28 none since  Cont Keppra 1g BID    Thrombocytopenia (HCC)  Assessment & Plan  Likely due to cirrhosis  Continue to monitor    Cognitive deficits  Assessment & Plan  Evaluated by SLP and found to have cognitive deficits, recommending SNF  Cognitive deficits likely secondary to SDH  Discussed with friend, he is not at previous baseline    3/23 Will likely need SNF placement, order placed  CM to assist  Accepted by HH  Noncompliance with pt/ot  Encourage activity    3/24 plan for OR 3/25  snf pending    Alcoholic cirrhosis of liver without ascites (HCC)  Assessment & Plan  Patient reports history of heavy alcohol use, has not drank in few years  Will need GI follow up as outpatient  Denies previous diagnosis of liver disease  Cirrhotic appearing liver on imaging without ascites  INR elevation representing liver dysfunction  Hep panel neg  Ammonia 26    Generalized abdominal pain  Assessment & Plan  No definitive etiology of pain, does have fat containing ventral hernia and diverticulosis without  diverticulitis  No evidence of ascites  Continue to monitor  Pain has since resolved    Venous stasis dermatitis of right lower extremity  Assessment & Plan  Does not appear infected on examination  Continue skin care per nursing protocol    Grade IV hemorrhoids  Assessment & Plan  Patient reports chronic hemorrhoids with occasional mild rectal bleeding  Noted this morning to have prolapsed hemorrhoids, tender with mild bleeding  Will prescribe lidocaine gel for pain relief  Hemorrhoids have improved today      Leukemoid reaction  Assessment & Plan  Patient with acute rise in WBC  No evidence of infection on CXR or UA  WBC trending down and now in normal range    Hyponatremia  Assessment & Plan  Asymptomatic hyponatremia  Hyponatremia worsening 3/22 now down to 126  3/23 stable at 129, cont current management  Likely SIADH  Free water restriction  Due to subdural hematoma will start salt supplementation    Increase NaCl tabs  Cont to follow daily    Valvular cardiomyopathy (HCC)- (present on admission)  Assessment & Plan  History of bioprosthetic mitral valve replacement    Pacemaker  Assessment & Plan  History of pacemaker limiting ability to obtain MRI    Obesity (BMI 30-39.9)- (present on admission)  Assessment & Plan  Will need weight loss program on discharge    Benign prostatic hyperplasia without lower urinary tract symptoms- (present on admission)  Assessment & Plan  Continue Flomax  No evidence of urinary obstruction       VTE prophylaxis: SCDs

## 2021-03-29 NOTE — PROGRESS NOTES
Neurosurgery Progress Note    Subjective:  No events over night    Exam:  Sleepy but awakes with verbal stimuli, GCS 15  PERRL, EOMI  +pronator drift  Right droop and tongue deviation  Right 4+/5, Left 5/5  Incision C/d/i      BP  Min: 97/64  Max: 145/88  Pulse  Av.1  Min: 80  Max: 83  Resp  Av.4  Min: 12  Max: 37  Temp  Av.9 °C (98.5 °F)  Min: 36.6 °C (97.8 °F)  Max: 37.2 °C (99 °F)  SpO2  Av.2 %  Min: 93 %  Max: 96 %    No data recorded    Recent Labs     21  04221  0535 21  0445   WBC 7.2 6.0 6.6   RBC 3.33* 3.48* 3.62*   HEMOGLOBIN 10.9* 11.3* 12.0*   HEMATOCRIT 32.8* 34.2* 35.7*   MCV 98.5* 98.3* 98.6*   MCH 32.7 32.5 33.1*   MCHC 33.2* 33.0* 33.6*   RDW 59.1* 58.4* 58.2*   PLATELETCT 62* 66* 68*   MPV 11.4 12.1 11.9     Recent Labs     21  0424 21  0535 21  0445   SODIUM 129* 128* 130*   POTASSIUM 5.0 4.8 4.9   CHLORIDE 100 96 97   CO2 22 22 25   GLUCOSE 190* 159* 121*   BUN 21 19 21   CREATININE 0.83 0.66 0.69   CALCIUM 8.2* 8.1* 8.5               Intake/Output       21 07 - 21 0659 21 07 - 21 0659       Total  Total       Intake    P.O.  650  180 830  --  -- --    P.O. 650 180 830 -- -- --    IV Piggyback  100  -- 100  --  -- --    Volume (mL) (levETIRAcetam (KEPPRA) 3,000 mg in  mL IVPB) 100 -- 100 -- -- --    Total Intake 750 180 930 -- -- --       Output    Urine  1100  1150 2250  --  -- --    Output (mL) (External Urinary Catheter (Condom)) 1100 1150 2250 -- -- --    Stool  --  -- --  --  -- --    Number of Times Stooled 0 x -- 0 x 0 x -- 0 x    Total Output 1100 1150 2250 -- -- --       Net I/O     -350 -970 -1320 -- -- --            Intake/Output Summary (Last 24 hours) at 3/29/2021 0911  Last data filed at 3/29/2021 0600  Gross per 24 hour   Intake 930 ml   Output 2250 ml   Net -1320 ml            • dexamethasone  2 mg TID    Followed by   • [START ON 2021]  dexamethasone  1 mg TID    Followed by   • [START ON 4/4/2021] dexamethasone  1 mg BID   • levETIRAcetam  1,000 mg BID   • sodium chloride  1 g TID WITH MEALS   • metoprolol tartrate  12.5 mg TWICE DAILY   • lisinopril  2.5 mg Q DAY   • artificial tears  1 Application Q2HRS PRN   • Pharmacy Consult Request  1 Each PHARMACY TO DOSE   • MD ALERT...DO NOT ADMINISTER NSAIDS or ASPIRIN unless ORDERED By Neurosurgery  1 Each PRN   • ondansetron  4 mg Q4HRS PRN   • insulin regular  2-9 Units 4X/DAY ACHS    And   • glucose  16 g Q15 MIN PRN    And   • dextrose 50%  50 mL Q15 MIN PRN   • insulin glargine  15 Units QAM INSULIN   • acetaminophen  650 mg Q6HRS   • allopurinol  100 mg QAM   • tamsulosin  0.4 mg DAILY   • oxyCODONE immediate-release  5 mg Q4HRS PRN    Or   • oxyCODONE immediate-release  10 mg Q4HRS PRN   • morphine injection  2-4 mg Q3HRS PRN   • labetalol  10 mg Q4HRS PRN   • senna-docusate  2 tablet BID    And   • polyethylene glycol/lytes  1 Packet QDAY PRN    And   • magnesium hydroxide  30 mL QDAY PRN    And   • bisacodyl  10 mg QDAY PRN   • Respiratory Therapy Consult   Continuous RT   • LORazepam  2 mg Q5 MIN PRN   • hydrALAZINE  10 mg Q4HRS PRN   • enalaprilat  1.25 mg Q6HRS PRN   • prochlorperazine  10 mg Q6HRS PRN       Assessment and Plan:  POD #3 left craniotomy for SDH  Prophylactic anticoagulation: no     Start date/time: 7 days post op    Witnessed seizures on 3/28: Keppra up to 1000mg BID  Na 130 on 1 g salt tab TID  Started florinef   Continue neuro checks and seizure managgements

## 2021-03-29 NOTE — THERAPY
"Occupational Therapy  Daily Treatment     Patient Name: Morales Olivier  Age:  65 y.o., Sex:  male  Medical Record #: [id number]  Today's Date: 3/29/2021       Precautions: Fall Risk, Swallow Precautions ( See Comments)  Comments: SDH s/p crani; seizure 3/28    Assessment    Pt seen for OT tx.  Pt was asleep in bed on arrival & needed encouragement to get OOB.  Pt required Min A for bed mobility with extra time.  Pt able to perform grooming tasks seated with Min A & extra time.  Pt had limited interest in OOB ADL's.  Pt reported \"I don't feel good\" but did not elaborate.  Pt encouraged to sit upright in chair & increase strength in his neck muscles while up in chair.    Plan    Continue current treatment plan.    DC Equipment Recommendations: Unable to determine at this time  Discharge Recommendations: Recommend post-acute placement for additional occupational therapy services prior to discharge home    Subjective    \"I'm tired\"     Objective       03/29/21 1125   Pain 0 - 10 Group   Therapist Pain Assessment During Activity;Nurse Notified  (\"I just don't feel good\")   Cognition    Cognition / Consciousness X   Speech/ Communication Delayed Responses   Level of Consciousness Lethargic   Ability To Follow Commands 1 Step   Safety Awareness Impaired   New Learning Impaired   Attention Impaired   Comments Pt required a lot of encouragement to get OOB & engage in ADL's   Balance   Sitting Balance (Static) Fair   Sitting Balance (Dynamic) Fair   Standing Balance (Static) Fair -   Standing Balance (Dynamic) Fair -   Weight Shift Sitting Good   Weight Shift Standing Fair   Comments pt did better in standing with FWW   Bed Mobility    Supine to Sit Minimal Assist   Sit to Supine   (pt left sitting up in chair)   Scooting Minimal Assist   Rolling Minimal Assist to Rt.   Skilled Intervention Compensatory Strategies;Postural Facilitation;Sequencing;Tactile Cuing;Verbal Cuing   Comments pt was fatigued & reproted not feeling " well but did agree to OOB with encouragement   Activities of Daily Living   Eating Supervision   Grooming Supervision;Seated   Upper Body Dressing Minimal Assist   Lower Body Dressing Maximal Assist   Functional Mobility   Sit to Stand Minimal Assist   Bed, Chair, Wheelchair Transfer Minimal Assist   Patient / Family Goals   Patient / Family Goal #1 to not feel so bad   Goal #1 Outcome Goal not met   Short Term Goals   Short Term Goal # 1 Pt will demo LB dressing using AE PRN w/ SPV   Goal Outcome # 1 Goal not met   Short Term Goal # 2 Pt will demo standing grooming w/ SPV   Goal Outcome # 2 Goal not met   Short Term Goal # 3 Pt will demo toilet txf w/ SPV   Goal Outcome # 3 Goal not met   Short Term Goal # 4 Pt will demo toilet hygiene w/ SPV   Goal Outcome # 4 Goal not met

## 2021-03-30 PROBLEM — D72.823 LEUKEMOID REACTION: Status: RESOLVED | Noted: 2021-01-01 | Resolved: 2021-01-01

## 2021-03-30 NOTE — DISCHARGE PLANNING
Anticipated Discharge Disposition: SNF    Action: Lsw met with pt at bedside to discuss SNF choice. Pt is agreeable to SNF. Lsw faxed choice to [facility].    Barriers to Discharge: SNF acceptance    Plan: Lsw to continue assisting with dc planning

## 2021-03-30 NOTE — DISCHARGE PLANNING
Received Choice form at 5301  Agency/Facility Name: [facility], [place], [place], [facility]  Referral sent per Choice form @ [phone]

## 2021-03-30 NOTE — DISCHARGE PLANNING
Please review the consult from Dr. Saldana regarding post acute recommendations.  [facility] will no longer follow.  Please reach out to myself @ [phone] with any questions.

## 2021-03-30 NOTE — PROGRESS NOTES
2 RN skin check   Preformed with OSWALD Kee    Sacrum red and blanching, mepilex in place. Heels dry, red purple and flaky.  occiput with L crani site. Bruising to abdomen and BUE. RLE wound with dresing CDI. Devices in place PIV, SCD's. skin underneath assessed, CDIB. Mepilex and condom cath in place to reduce skin breakdown. Education provided on activity and mobilization encouraged.

## 2021-03-30 NOTE — PROGRESS NOTES
Neurosurgery Progress Note    Subjective:  No events over night  More awake today  +some HA  Seen in conjunction with Dr. iglesias    Exam:  A/Ox4, slurred speech  PERRL, EOMI  +pronator drift  Right droop and tongue deviation  Right 4+/5, Left 5/5  Incision C/d/i      BP  Min: 89/57  Max: 135/89  Pulse  Av.8  Min: 77  Max: 81  Resp  Av.6  Min: 15  Max: 47  Temp  Av.7 °C (98 °F)  Min: 36.1 °C (96.9 °F)  Max: 37 °C (98.6 °F)  SpO2  Av.3 %  Min: 93 %  Max: 99 %    No data recorded    Recent Labs     21  0535 21  0445   WBC 6.0 6.6   RBC 3.48* 3.62*   HEMOGLOBIN 11.3* 12.0*   HEMATOCRIT 34.2* 35.7*   MCV 98.3* 98.6*   MCH 32.5 33.1*   MCHC 33.0* 33.6*   RDW 58.4* 58.2*   PLATELETCT 66* 68*   MPV 12.1 11.9     Recent Labs     21  0535 21  0445   SODIUM 128* 130*   POTASSIUM 4.8 4.9   CHLORIDE 96 97   CO2 22 25   GLUCOSE 159* 121*   BUN 19 21   CREATININE 0.66 0.69   CALCIUM 8.1* 8.5               Intake/Output       21 0700 - 21 0659 21 07 - 21 0659       Total  Total       Intake    P.O.  750  200 950  --  -- --    P.O. 750 200 950 -- -- --    Total Intake 750 200 950 -- -- --       Output    Urine  900  -- 900  --  -- --    Output (mL) (External Urinary Catheter (Condom)) 900 -- 900 -- -- --    Stool  --  -- --  --  -- --    Number of Times Stooled 0 x -- 0 x -- -- --    Total Output 900 -- 900 -- -- --       Net I/O     -150 200 50 -- -- --            Intake/Output Summary (Last 24 hours) at 3/30/2021 0758  Last data filed at 3/29/2021 2000  Gross per 24 hour   Intake 950 ml   Output 900 ml   Net 50 ml            • fludrocortisone  0.1 mg QAM   • dexamethasone  2 mg TID    Followed by   • [START ON 2021] dexamethasone  1 mg TID    Followed by   • [START ON 2021] dexamethasone  1 mg BID   • levETIRAcetam  1,000 mg BID   • sodium chloride  1 g TID WITH MEALS   • metoprolol tartrate  12.5 mg TWICE DAILY   •  lisinopril  2.5 mg Q DAY   • artificial tears  1 Application Q2HRS PRN   • Pharmacy Consult Request  1 Each PHARMACY TO DOSE   • MD ALERT...DO NOT ADMINISTER NSAIDS or ASPIRIN unless ORDERED By Neurosurgery  1 Each PRN   • ondansetron  4 mg Q4HRS PRN   • insulin regular  2-9 Units 4X/DAY ACHS    And   • glucose  16 g Q15 MIN PRN    And   • dextrose 50%  50 mL Q15 MIN PRN   • insulin glargine  15 Units QAM INSULIN   • acetaminophen  650 mg Q6HRS   • allopurinol  100 mg QAM   • tamsulosin  0.4 mg DAILY   • oxyCODONE immediate-release  5 mg Q4HRS PRN    Or   • oxyCODONE immediate-release  10 mg Q4HRS PRN   • morphine injection  2-4 mg Q3HRS PRN   • labetalol  10 mg Q4HRS PRN   • senna-docusate  2 tablet BID    And   • polyethylene glycol/lytes  1 Packet QDAY PRN    And   • magnesium hydroxide  30 mL QDAY PRN    And   • bisacodyl  10 mg QDAY PRN   • Respiratory Therapy Consult   Continuous RT   • LORazepam  2 mg Q5 MIN PRN   • hydrALAZINE  10 mg Q4HRS PRN   • enalaprilat  1.25 mg Q6HRS PRN   • prochlorperazine  10 mg Q6HRS PRN       Assessment and Plan:  POD #4 left craniotomy for SDH  Prophylactic anticoagulation: no     Start date/time: TBD    Witnessed seizures on 3/28: Keppra up to 1000mg BID  Na 127 today, increased salt tab to 2g TID with florinef  Continue neuro checks and seizure managements      Head CT reviewed by Dr. Hudson'  Some evidence of ischemic changes in left frontal area but stable CT overall  OK to transfer to SNF when ready  No aspirin/NSAIDs  Staples/sutures out 2 weeks post op   OK to shower, pat incision dry  Follow up with [facility] neurosurgery group in 4 weeks with a new head ct: our office will arrange

## 2021-03-30 NOTE — CARE PLAN
Problem: Knowledge Deficit  Goal: Knowledge of disease process/condition, treatment plan, diagnostic tests, and medications will improve  Outcome: PROGRESSING AS EXPECTED  Note: Pt updated on POC. All questions and concerns addressed at this time.      Problem: Safety  Goal: Will remain free from falls  Outcome: PROGRESSING AS EXPECTED  Note: Fall/aspiration/seizure precautions in place. Bed alarm and chair alarm in use. Treaded socks and fall wristband on. Bed locked and in lowest position. Side rails up x3. All belongings and call light within reach. Hourly rounding in place.

## 2021-03-30 NOTE — CARE PLAN
Problem: Knowledge Deficit  Goal: Knowledge of disease process/condition, treatment plan, diagnostic tests, and medications will improve  Outcome: PROGRESSING AS EXPECTED  Note: Education provided on plan of care. All questions answered. Call light is within reach.      Problem: Skin Integrity  Goal: Risk for impaired skin integrity will decrease  Outcome: PROGRESSING AS EXPECTED  Note: Q2 hr turns in place. Dressing changes performed as ordered. Crani incision is CDI.

## 2021-03-31 NOTE — THERAPY
"Speech Language Pathology  Daily Treatment     Patient Name: Morales Olivier  Age:  65 y.o., Sex:  male  Medical Record #: [id number]  Today's Date: 3/31/2021     Precautions  Precautions: (P) Fall Risk, Swallow Precautions ( See Comments)  Comments: (P) SDH s/p crani, hx of seizures.     Assessment    Per nurs, pt with 1-1 recommendation for intake and did well at ast with request for monitoring during meals if approp. Pt stating nausea but requesting pineapple and agreeable to thin water. Puree pineapple from kitchen and approx 4 oz of thin water intake during tx. No delay in swallow and no s/s of difficulty. No pocketing. Pt off by one for the date \"April 1st.\" Pt accurately stating year but off for LOS stating he has been hospitalized for 1 month. Swallow strategies amended and reposted. Monitor during meal of MM5/TNO    Plan    Continue current treatment plan.    Discharge Recommendations: (P) Recommend post-acute placement for additional speech therapy services prior to discharge home       03/31/21 1053   Voice   Comments WNL   Dysphagia    Dysphagia X   Positioning / Behavior Modification Modulate Rate or Bite Size  (sitting up at 90 degrees,)   Diet / Liquid Recommendation Minced & Moist (5) - (Dysphagia II);Thin (0)   Nutritional Liquid Intake Rating Scale Non thickened beverages   Nutritional Food Intake Rating Scale Total oral diet with multiple consistencies but requiring special preparation or compensations   Nursing Communication Swallow Precaution Sign Posted at Head of Bed   Skilled Intervention Compensatory Strategies;Verbal Cueing   Recommended Route of Medication Administration   Medication Administration  Float Whole with Puree   Patient / Family Goals   Patient / Family Goal #1 I don't feel good, my head hurts.   Goal #1 Outcome Progressing as expected   Short Term Goals   Short Term Goal # 1 per 3/29 notes diet MM5/TNO. Goal 3/31 Pt will consume MM5/TNO without s/s of difficulty during oral " intake and with monitoring.    Goal Outcome # 1 Progressing as expected   Short Term Goal # 2 Pt will maintain attention to task for 2 minutes with min-mod cues   Goal Outcome # 2  Progressing as expected   Session Information   Priority 2  (3x,SDH,mod-severecog deficits,toleratingdowngrded MM5/TNO)

## 2021-03-31 NOTE — CARE PLAN
Problem: Knowledge Deficit  Goal: Knowledge of disease process/condition, treatment plan, diagnostic tests, and medications will improve  Outcome: PROGRESSING AS EXPECTED  Note: Understands plan of care, assessments, and medications available     Problem: Skin Integrity  Goal: Risk for impaired skin integrity will decrease  Outcome: PROGRESSING AS EXPECTED  Note: Crani site intact, old drainage present, staples present. RLE wound dressing changed per order, no signs of infection.

## 2021-03-31 NOTE — THERAPY
"Occupational Therapy  Daily Treatment     Patient Name: Morales Olivier  Age:  65 y.o., Sex:  male  Medical Record #: [id number]  Today's Date: 3/31/2021       Precautions: Fall Risk, Swallow Precautions ( See Comments)  Comments: SDH s/p crani hx of seizures     Assessment    Pt seen for OT tx. Continues to be limited by decreased activity tolerance, balance deficits, inattention, poor safety awareness and lethargy impacting ability to complete ADLs and ADL transfers independently. Required extra time to initiate ADL tasks. C/o \"feeling like crap\" throughout tx session but cooperative throughout. Encouraged pt to continue getting up w/ nrsg staff as tolerated. Will continue to benefit from OT services while in house.     Plan    Continue current treatment plan.    DC Equipment Recommendations: Unable to determine at this time  Discharge Recommendations: Recommend post-acute placement for additional occupational therapy services prior to discharge home       03/31/21 0925   Cognition    Cognition / Consciousness X   Safety Awareness Impaired   New Learning Impaired   Attention Impaired   Comments extra time to initiate, cooperative but c/o throughout \"I just feel like crap\"    Balance   Sitting Balance (Static) Fair   Sitting Balance (Dynamic) Fair   Standing Balance (Static) Fair -   Standing Balance (Dynamic) Fair -   Weight Shift Sitting Fair   Weight Shift Standing Fair   Bed Mobility    Supine to Sit Supervised   Sit to Supine   (left up in chair w/ nrsg )   Scooting Supervised   Activities of Daily Living   Grooming Supervision;Seated   Upper Body Dressing Minimal Assist   Lower Body Dressing Maximal Assist   Functional Mobility   Sit to Stand Minimal Assist   Bed, Chair, Wheelchair Transfer Minimal Assist   Short Term Goals   Short Term Goal # 1 Pt will demo LB dressing using AE PRN w/ SPV   Goal Outcome # 1 Goal not met   Short Term Goal # 2 Pt will demo standing grooming w/ SPV   Goal Outcome # 2 Goal not " met   Short Term Goal # 3 Pt will demo toilet txf w/ SPV   Goal Outcome # 3 Progressing as expected   Short Term Goal # 4 Pt will demo toilet hygiene w/ SPV   Goal Outcome # 4 Goal not met   Anticipated Discharge Equipment and Recommendations   DC Equipment Recommendations Unable to determine at this time   Discharge Recommendations Recommend post-acute placement for additional occupational therapy services prior to discharge home

## 2021-03-31 NOTE — CONSULTS
Consult Note: Oncology    Date of consultation: 3/31/2021 2:13 PM    Referring provider: No ref. provider found    Reason for consultation: Low platelets, subdural hematoma.    History of presenting illness:      65 y.o. year old male with a past medical history of hypertension, type 2 diabetes, atrial fibrillation on warfarin-?  Valvular A. fib s/p bioprosthetic mitral valve replacement, pacemaker in situ admitted to [facility] on 03/12/2021 with complaints of altered mental status, found on the floor by friend. On admission, patient was found to have right upper extremity weakness and aphasia, imaging revealed left subdural hematoma,got KCentra, surveillance CT on 03/22 revealed slight enlargement of SDH with some midline shift, currently POD #4 lLft craniotomy for SDH.    He had prior admission on 2/2021 with pneumonia and was discharged on Lovenox with Coumadin.  His platelets at the time of admission was in the 200 range which subsequently dropped to 60,000 range in a gradual fashion.  He has been on steroids and Keppra all this time.  No significant anemia or leukopenia.  CBC differential grossly unremarkable except for expected neutrophilia and mild left shift.  Hematology consulted in this regard.  Review of prior CBC shows that he had a component of mild thrombocytopenia dating back to last year with most of the platelets staying above 100 range.  No documented heparin exposure however it appears that he was on Lovenox after last discharge.  His numerous complaints.    Past Medical History:    Past Medical History:   Diagnosis Date    Atrial fibrillation (HCC)     Back pain     Bronchitis     CAD (coronary artery disease)     COPD (chronic obstructive pulmonary disease) (HCC)     Dehydration 10/22/2019    Gout     Heart valve disease     mitral valve replacement (bovine)    Hypertension     Kidney stone     Obesity     Open wound 9/2/2009    Pacemaker     Personal history of venous thrombosis and embolism 2009     DVT right leg    PVC (premature ventricular contraction) 4/13/2019    Renal disorder     kidney stones    Type II or unspecified type diabetes mellitus without mention of complication, not stated as uncontrolled     DM type II- diet controlled       Past surgical history:    Past Surgical History:   Procedure Laterality Date    CRANIOTOMY Left 3/25/2021    Procedure: CRANIOTOMY - FOR SUBDURAL.;  Surgeon: Naveed Hudson M.D.;  Location: SURGERY [facility];  Service: Neurosurgery    MITRAL VALVE REPLACE  3/8/2017    Procedure: MITRAL VALVE REPLACE-REDO;  Surgeon: Cornelia Wright M.D.;  Location: SURGERY [facility];  Service:     KD  3/8/2017    Procedure: KD;  Surgeon: Cornelia Wright M.D.;  Location: SURGERY [facility];  Service:     IRRIGATION & DEBRIDEMENT GENERAL  7/20/2009    Performed by MICHEL URBINA at SURGERY [facility]    WIDE EXCISION  7/20/2009    Performed by MICHEL URBINA at SURGERY [facility]    ANGIOGRAM  7/4/2009    Performed by MICHEL URBINA at SURGERY [facility] ORS    CATH REMOVAL  7/4/2009    Performed by MICHEL URBINA at SURGERY [facility] ORS    THROMBECTOMY  7/4/2009    Performed by MICHEL URBINA at SURGERY [facility]    EMBOLECTOMY  7/4/2009    Performed by MICHEL URBINA at SURGERY [facility]    ANGIOGRAM  7/3/2009    Performed by MORGAN WARD at SURGERY [facility]    MITRAL VALVE REPLACE  3/14/08    Performed by CORNELIA WRIGHT at SURGERY [facility] ORS    MAZE PROCEDURE  3/14/08    Performed by CORNELIA WRIGHT at SURGERY [facility]    OTHER CARDIAC SURGERY  2008    mitral valve replacement    AORTIC VALVE REPLACEMENT      OTHER ABDOMINAL SURGERY      imbulica repair        Allergies:  Heparin    Medications:    Current Facility-Administered Medications   Medication Dose Route Frequency Provider Last Rate Last Admin    Pharmacy Consult: HIT Monitoring   Other PRN Vitaliy Wetzel M.D.        dexamethasone (DECADRON)  tablet 4 mg  4 mg Oral Q6HRS Morales De Souza M.D.        sodium chloride (SALT) tablet 2 g  2 g Oral TID WITH MEALS WILLIAM AvilaPHasmukhRHasmukhNHasmukh   2 g at 03/31/21 1224    fludrocortisone (FLORINEF) tablet 0.1 mg  0.1 mg Oral QAM JESSICA AvilaRHasmukhNHasmukh   0.1 mg at 03/31/21 0541    levETIRAcetam (KEPPRA) tablet 1,000 mg  1,000 mg Oral BID Raj Pacheco M.D.   1,000 mg at 03/31/21 0541    metoprolol tartrate (LOPRESSOR) tablet 12.5 mg  12.5 mg Oral TWICE DAILY Maykel Sanchez M.D.   12.5 mg at 03/31/21 0541    lisinopril (PRINIVIL) tablet 2.5 mg  2.5 mg Oral Q DAY Maykel Sanchez M.D.   2.5 mg at 03/31/21 0542    artificial tears (EYE LUBRICANT) ophth ointment 1 Application  1 Application Both Eyes Q2HRS PRN Raj Pacheco M.D.        Pharmacy Consult Request ...Pain Management Review 1 Each  1 Each Other PHARMACY TO DOSE MARIA INES Traylor.        MD ALERT...DO NOT ADMINISTER NSAIDS or ASPIRIN unless ORDERED By Neurosurgery 1 Each  1 Each Other PRN KRYSTINA Traylor        ondansetron (ZOFRAN) syringe/vial injection 4 mg  4 mg Intravenous Q4HRS PRN JESSICA TraylorRHasmukhNHasmukh   4 mg at 03/27/21 0606    insulin regular (HumuLIN R,NovoLIN R) injection  2-9 Units Subcutaneous 4X/DAY ACHS Ajay Cook M.D.   Stopped at 03/31/21 0818    And    glucose 4 g chewable tablet 16 g  16 g Oral Q15 MIN PRN Ajay Cook M.D.        And    dextrose 50% (D50W) injection 50 mL  50 mL Intravenous Q15 MIN PRN Ajay Cook M.D.        acetaminophen (Tylenol) tablet 650 mg  650 mg Oral Q6HRS Ajay Cook M.D.   650 mg at 03/31/21 1225    allopurinol (ZYLOPRIM) tablet 100 mg  100 mg Oral QAM Charanjit Boyd M.D.   100 mg at 03/31/21 0541    tamsulosin (FLOMAX) capsule 0.4 mg  0.4 mg Oral DAILY Charanjit Boyd M.D.   0.4 mg at 03/31/21 0541    oxyCODONE immediate-release (ROXICODONE) tablet 5 mg  5 mg Oral Q4HRS PRN Raj Pacheco M.D.   5 mg at 03/31/21 1225    Or    oxyCODONE immediate release (ROXICODONE)  tablet 10 mg  10 mg Oral Q4HRS PRN Raj Pacheco M.D.   10 mg at 21 0047    morphine (pf) 4 mg/mL injection 2-4 mg  2-4 mg Intravenous Q3HRS PRN Raj Pacheco M.D.   2 mg at 21 2206    labetalol (NORMODYNE/TRANDATE) injection 10 mg  10 mg Intravenous Q4HRS PRN Santiago Encarnacion M.D.   10 mg at 21 0040    senna-docusate (PERICOLACE or SENOKOT S) 8.6-50 MG per tablet 2 tablet  2 tablet Oral BID Santiago Encarnacion M.D.   2 tablet at 21 0541    And    polyethylene glycol/lytes (MIRALAX) PACKET 1 Packet  1 Packet Oral QDAY PRN Santiago Encarnacion M.D.   1 Packet at 21 0542    And    magnesium hydroxide (MILK OF MAGNESIA) suspension 30 mL  30 mL Oral QDAY PRN Santiago Encarnacion M.D.   30 mL at 21 0541    And    bisacodyl (DULCOLAX) suppository 10 mg  10 mg Rectal QDAY PRN Santiago Encarnacion M.D.        Respiratory Therapy Consult   Nebulization Continuous RT Gabriel Clarke M.D.        LORazepam (ATIVAN) injection 2 mg  2 mg Intravenous Q5 MIN PRN Gabriel Clarke M.D.   2 mg at 21 1754    hydrALAZINE (APRESOLINE) injection 10 mg  10 mg Intravenous Q4HRS PRN Gabriel Clarke M.D.   10 mg at 03/15/21 1647    enalaprilat (VASOTEC) injection 1.25 mg  1.25 mg Intravenous Q6HRS PRN Raj Pacheco M.D.        prochlorperazine (COMPAZINE) injection 10 mg  10 mg Intravenous Q6HRS PRN Gabriel Clarke M.D.   10 mg at 21 1049       Social History:     Social History     Socioeconomic History    Marital status: Single     Spouse name: Not on file    Number of children: Not on file    Years of education: Not on file    Highest education level: Not on file   Occupational History    Not on file   Tobacco Use    Smoking status: Former Smoker     Packs/day: 2.50     Years: 9.00     Pack years: 22.50     Types: Cigarettes     Quit date: 1981     Years since quittin.2    Smokeless tobacco: Never Used   Substance and Sexual Activity    Alcohol use: No    Drug use: Yes      Types: Marijuana    Sexual activity: Not on file   Other Topics Concern    Not on file   Social History Narrative    Not on file     Social Determinants of Health     Financial Resource Strain: Low Risk     Difficulty of Paying Living Expenses: Not very hard   Food Insecurity: No Food Insecurity    Worried About Running Out of Food in the Last Year: Never true    Ran Out of Food in the Last Year: Never true   Transportation Needs: Unmet Transportation Needs    Lack of Transportation (Medical): Yes    Lack of Transportation (Non-Medical): Yes   Physical Activity:     Days of Exercise per Week:     Minutes of Exercise per Session:    Stress:     Feeling of Stress :    Social Connections:     Frequency of Communication with Friends and Family:     Frequency of Social Gatherings with Friends and Family:     Attends [employer] Services:     Active Member of Clubs or Organizations:     Attends Club or Organization Meetings:     Marital Status:    Intimate Partner Violence:     Fear of Current or Ex-Partner:     Emotionally Abused:     Physically Abused:     Sexually Abused:        Family History:     Family History   Problem Relation Age of Onset    Diabetes Mother     Lung Disease Father     Heart Disease Neg Hx        Review of Systems:  All other review of systems are negative except what was mentioned above in the HPI.    Constitutional: No fever, chills, weight loss , positive malaise/fatigue.    HEENT: No new auditory or visual complaints. No sore throat and neck pain.     Respiratory:No new cough, sputum production, shortness of breath and wheezing.    Cardiovascular: No new chest pain, palpitations, orthopnea and leg swelling.    Gastrointestinal: No heartburn, nausea, vomiting ,abdominal pain, hematochezia or melena     Genitourinary: Negative for dysuria, hematuria    Musculoskeletal: No new arthralgias or myalgias   Skin: Negative for rash and itching.    Neurological: Positive for focal weakness or headaches.   "  Endo/Heme/Allergies: No abnormal bleed/bruise.    Psychiatric/Behavioral: No new depression/anxiety.    Physical Exam:  Vitals:   /85   Pulse 79   Temp 36.2 °C (97.1 °F) (Temporal)   Resp 18   Ht 1.778 m (5' 10\")   Wt 98.1 kg (216 lb 4.3 oz)   SpO2 99%   BMI 31.03 kg/m²     General: Not in acute distress, alert and oriented x 3  HEENT: No pallor, icterus. Oropharynx clear.  Edentulous.  Scalp incision  Neck: Supple, no palpable masses.  Lymph nodes: No palpable cervical, supraclavicular, axillary or inguinal lymphadenopathy.    CVS: regular rate and rhythm, no rubs or gallops  RESP: Clear to auscultate bilaterally, no wheezing or crackles.   ABD: Soft, non tender, non distended, positive bowel sounds, no palpable organomegaly  EXT: No edema or cyanosis  CNS: Alert and oriented x3, No focal deficits.  Skin-multiple ecchymosis    Labs:   Recent Labs     03/29/21  0445 03/31/21  0305   RBC 3.62* 3.43*   HEMOGLOBIN 12.0* 11.2*   HEMATOCRIT 35.7* 33.3*   PLATELETCT 68* 60*     Lab Results   Component Value Date/Time    SODIUM 133 (L) 03/31/2021 03:05 AM    POTASSIUM 4.4 03/31/2021 03:05 AM    CHLORIDE 102 03/31/2021 03:05 AM    CO2 25 03/31/2021 03:05 AM    GLUCOSE 107 (H) 03/31/2021 03:05 AM    BUN 25 (H) 03/31/2021 03:05 AM    CREATININE 0.80 03/31/2021 03:05 AM    CREATININE 1.4 04/27/2009 10:08 AM        Assessment and Plan:    Gradual thrombocytopenia in a patient admitted with subdural hematoma secondary to anticoagulation and fall?  -Rest of CBC is grossly unremarkable.  He did have mild component of hrombocytopenia in his prior CBC dating back to 2020.  Low suspicion for any primary hematological condition.    Thrombocytopenia could be multifactorial.  He is already on high-dose steroids which should have improved his platelet count if he had ITP.    He has some component of cirrhosis per CT reports which can also contribute to some baseline thrombocytopenia.  Despite low probability will still " screening for HIT as Shahriar has some heparin component and he also had some Lovenox exposure before.  -We will also rule out any DIC TTP appears highly unlikely will review the peripheral smear. Check Hepatitis/ HIV for screening   -For a typical patient no need for any intervention at platelet count of 60 however if his platelet count start dropping more recommend platelet transfusions given his SDH.  -He is currently off of anticoagulation.  His current medication list does not reveal any obvious culprits for thrombocytopenia occasionally allopurinol can cause thrombocytopenia and if not needed can hold allopurinol.    He agreed and verbalized his agreement and understanding with the current plan.  I answered all questions and concerns he has at this time.              Thank you for allowing me to participate in his care.    Please note that this dictation was created using voice recognition software. I have made every reasonable attempt to correct obvious errors, but I expect that there are errors of grammar and possibly content that I did not discover before finalizing the note.      SIGNATURES:  Vitaliy Wetzel M.D.    CC:  Yelena Haney P.A.-C.  No ref. provider found

## 2021-03-31 NOTE — PROGRESS NOTES
Hospital Medicine Daily Progress Note    Date of Service  3/31/2021    Chief Complaint  65 y.o. male admitted 3/12/2021 with altered mentation.    Hospital Course   66yo PMHx ETOH, AFIb, anticoagulation with warfarin, CAD, bioprosthetic MVR, gout, HTN, PPM, DVT/PE, DM.    Friend could not get a hold of pt for 3 days.  EMS called and found pt with R side deficits.  In ED CT demonstrating L wholohemispheric SDH.  INR reversed with KCentra.  Initially treated non operatively however surveilance CT morena 3/22 showed the SDH was enlarging so taken to the OR.  Angiogram on 3/15 neg for vascular abnormalities.    3/25 taken to OR for L craniotomy and evacuation by Dr Hudson  3/28 witnessed Sz      Interval Problem Update  3/30: Patient seen at bedside this morning.  The patient mentions he feels somewhat better.  As per neurosurgery patient can be discharged to SNF once medically cleared.  We are pending recommendations as to when to restart anticoagulation by neurosurgery, we appreciate recommendations.  As per nursing no other overnight events reported.    3/31: Patient seen at bedside this morning.  The patient seems more lethargic.  Neurosurgery in the room at the same time as my evaluation, they ordered a CT scan which did not reveal acute abnormality.  It would like an MRI of the brain, however the patient with a pacemaker, we will inquire possible to do the MRI.  We have also consulted hematology due to ongoing thrombocytopenia.  As per nursing no other overnight events were reported.    Consultants/Specialty  Neurosurgery    Code Status  Full Code    Disposition  SNF once medically cleared.    Review of Systems  Review of Systems   Constitutional: Negative for chills and fever.   HENT: Negative for nosebleeds and sore throat.    Eyes: Negative for blurred vision and double vision.   Respiratory: Negative for cough and shortness of breath.    Cardiovascular: Negative for chest pain, palpitations and leg swelling.    Gastrointestinal: Negative for abdominal pain, diarrhea, nausea and vomiting.   Genitourinary: Negative for dysuria and urgency.   Musculoskeletal: Negative for back pain.   Skin: Negative for rash.   Neurological: Positive for weakness and headaches. Negative for dizziness and loss of consciousness.        Physical Exam  Temp:  [36.2 °C (97.1 °F)-36.8 °C (98.3 °F)] 36.2 °C (97.1 °F)  Pulse:  [73-80] 79  Resp:  [16-18] 18  BP: (106-124)/(68-85) 116/85  SpO2:  [96 %-99 %] 99 %    Physical Exam  Vitals reviewed.   Constitutional:       General: He is not in acute distress.     Appearance: Normal appearance. He is well-developed. He is not diaphoretic.      Comments: Patient seems more lethargic   HENT:      Head: Normocephalic and atraumatic.      Comments: L scalp incision noted.  No signs of infection  Eyes:      Conjunctiva/sclera: Conjunctivae normal.   Neck:      Vascular: No JVD.   Cardiovascular:      Rate and Rhythm: Normal rate.      Heart sounds: No murmur. No gallop.    Pulmonary:      Effort: Pulmonary effort is normal. No respiratory distress.      Breath sounds: No stridor. No wheezing or rales.   Abdominal:      Palpations: Abdomen is soft.      Tenderness: There is no abdominal tenderness. There is no guarding or rebound.   Musculoskeletal:         General: No tenderness.      Right lower leg: No edema.      Left lower leg: No edema.   Skin:     General: Skin is warm and dry.      Coloration: Skin is pale.      Findings: No rash.   Neurological:      General: No focal deficit present.      Mental Status: He is alert and oriented to person, place, and time.   Psychiatric:         Thought Content: Thought content normal.         Fluids    Intake/Output Summary (Last 24 hours) at 3/31/2021 1611  Last data filed at 3/31/2021 0500  Gross per 24 hour   Intake 480 ml   Output 950 ml   Net -470 ml       Laboratory  Recent Labs     03/29/21  0445 03/31/21  0305 03/31/21  1343   WBC 6.6 6.9 6.4   RBC 3.62*  3.43* 3.50*   HEMOGLOBIN 12.0* 11.2* 11.5*   HEMATOCRIT 35.7* 33.3* 34.7*   MCV 98.6* 97.1 99.1*   MCH 33.1* 32.7 32.9   MCHC 33.6* 33.6* 33.1*   RDW 58.2* 58.4* 59.4*   PLATELETCT 68* 60* 71*   MPV 11.9 10.9 12.1     Recent Labs     03/29/21  0445 03/30/21  1120 03/31/21  0305   SODIUM 130* 127* 133*   POTASSIUM 4.9 4.3 4.4   CHLORIDE 97 97 102   CO2 25 21 25   GLUCOSE 121* 137* 107*   BUN 21 27* 25*   CREATININE 0.69 0.72 0.80   CALCIUM 8.5 8.3* 8.2*                   Imaging  CT-HEAD W/O   Final Result      No significant interval change from one day prior.      CT-HEAD W/O   Final Result      1.  There is overall stable residual left frontal subdural and subarachnoid hemorrhage.   2.  There is left sided hemispheric edema with 3 mm of left-to-right midline shift, previously 4 mm.   3.  There is no new hemorrhage.   4.  There has been slight interval resorption of pneumocephalus with otherwise stable left craniotomy changes.   5.  Focal hypodensity in the inferior left frontal lobe again seen most consistent with an area of evolving ischemia.   6.  Underlying atrophy and white matter disease again noted.      CT-HEAD W/O   Final Result      1.  Stable sequela of left craniotomy, with stable left-sided subarachnoid and subdural hemorrhage.   2.  Residual 4 mm left-to-right midline shift.   3.  No CT evidence of new hemorrhage or large vascular territory infarct.      CT-HEAD W/O   Final Result      Interval left craniotomy with overall decrease in volume of subdural hematoma with some postsurgical extra-axial hemorrhage deep to the craniotomy flap with associated subarachnoid hemorrhage.      Persistent mass effect and midline shift from left-to-right of approximately 5 mm.      CT-HEAD W/O   Final Result      Left extra-axial hematoma is again noted. Subacute blood closer to the vertex along the left frontoparietal lobes is mildly increased compared to prior measuring 1.2 compared to 1 cm.      Mass effect is  again noted with 4.6 mm midline shift to the right, slightly increased compared to prior.      CT-ABDOMEN-PELVIS WITH   Final Result      1.  Ascites is no longer identified.      2.  Liver surface is mildly nodular consistent with cirrhosis.      3.  Splenomegaly is again noted. Punctate focus of decreased attenuation in the spleen could be due to normal differential enhancement although small hemangioma is also possible.      4.  Ventral abdominal wall hernia containing abdominal fat is again identified.      5.  Consolidation and volume loss is noted posteriorly in the right lower lobe which could indicate pneumonia.      6.  Diverticulosis.         DX-CHEST-PORTABLE (1 VIEW)   Final Result      Small right pleural effusion.      Right basilar opacities may represent atelectasis or scarring.         CT-HEAD W/O   Final Result      1.  Stable size of the extra-axial hemorrhage overlying the left cerebral hemisphere with sulci effacement and approximately 3 mm of left-to-right midline shift.         IR-CAROTID-CEREBRAL BILATERAL   Final Result         1.  Normal cerebral arteriogram.      2.  No evidence of arteriovenous malformation or intracranial aneurysm formation.      3.  No evidence of atherosclerotic disease in the right common femoral artery.      CT-CTA HEAD WITH & W/O-POST PROCESS   Final Result      No evidence of occlusive lesion or aneurysm.      Multiple tortuous small vessels along the anterior-inferior aspect of the left temporal lobe could represent some type of vascular malformation.      No significant interval change in left-sided holohemispheric subdural hematoma extending along the falx and layering over the tentorium.      CT-CTA NECK WITH & W/O-POST PROCESSING   Final Result      CT angiogram of the neck within normal limits.      CT-HEAD W/O   Final Result         1. Grossly unchanged left hemispheric subdural hematoma, most in the left frontal and temporal region.   2. No new intracranial  hemorrhage.                  CT-HEAD W/O   Final Result      1.  Stable large LEFT hemispheric subdural hematoma with associated mass effect and midline shift.   2.  No significant change from prior exam obtained 2 hours earlier.      CT-HEAD W/O   Final Result      Large LEFT holohemispheric subdural hematoma with associated mass effect and 4 mm LEFT to RIGHT midline shift.      These findings were discussed with EFREM FITZGERALD on 3/12/2021 11:40 AM.      DX-CHEST-PORTABLE (1 VIEW)   Final Result      1.  Hypoinflation, improved from prior.   2.  Minimal RIGHT lung base atelectasis or scar.   3.  Minimal RIGHT pleural effusion.           Assessment/Plan  * Subdural hematoma, acute (HCC)  Assessment & Plan  Continues to have headache  Neurosurgery following and recommending continuing steroids and Keppra for seizure prophylaxis. On steroid taper  Repeat CT scan 3/22: 1.2cm extra axial hematoma (from 1cm) with 4.6mm midline shift to right slightly greater compared to prior imaging  Neurosurgery following  PT/OT/SLP following, continue therapy    S/p craniotomy 3/25  Cont Neuro checks  BP control  Sz prophylaxis  PT/OT/SLP    3/30: As per neurosurgery, patient able to be discharged to skilled nursing facility when ready.  Staples and sutures out 2 weeks postop, follow-up with [facility] neurosurgery group in 4 weeks with a new head CT as per neurosurgery note, their office will arrange it.  Neurosurgery has also increased salt tablets to 2 g 3 times daily with Florinef.    3/31: Patient seems more lethargic this morning.  Neurosurgery repeated CT scan which did not reveal acute abnormality.  Neurosurgery would like to have an MRI of the brain, patient with pacemaker we will inquire if it is compatible with MRI machine.  Neurosurgery also recommends starting the patient on Decadron 4 mg every 6 hours for now.      Thrombocytopenia (HCC)  Assessment & Plan  Likely due to cirrhosis  Continue to monitor    3/31: While  thrombocytopenia could have a component of cirrhosis.  The patient's platelet count on 18 March was 221.  We have consulted hematology for further recommendations.      History of prosthetic mitral valve  Assessment & Plan  With paroxysmal atrial fibrillation  Anticoagulation reversed given subdural hematoma  Resume anticoagulation when bleeding risk felt to be acceptable by neurosurgery    COPD (chronic obstructive pulmonary disease) (HCC)- (present on admission)  Assessment & Plan  Patient not in acute exacerbation or requiring oxygen  O2/RT protocols    Gout- (present on admission)  Assessment & Plan  Continue allopurinol    3/31: Due to ongoing thrombocytopenia and as per hematology's recommendation we will hold allopurinol for now.    Type 2 diabetes mellitus without complication, without long-term current use of insulin (HCC)- (present on admission)  Assessment & Plan  Patient with hyperglycemia worsened by steroids  A1C 5.9  lantus 15 units  SSI  Well controled on current regimen however will likely need to cut back lantus as decadron is tapered off    3/30: We will stop lantus, and continue with ISS. We will monitor and make changes accordingly. Hypoglycemia protocol in place.    3/31: As per neurosurgery we have started the patient on Decadron 4 mg every 6 hours, we will monitor glucose closely if needed we will restart Lantus.        Atrial fibrillation (HCC)  Assessment & Plan  Holding anticoagulation in setting of acute SDH  Rate control with metoprolol  Cont Tele monitoring    3/31: As per neurosurgery patient to resume anticoagulation in 2 weeks, unless he requires new neurosurgical intervention.    Seizure (HCC)  Assessment & Plan  Secondary to SDH  Witnessed event: pt started with aphasia then gaze went upward and fixed with head tremor.  Pt was then unresponsive.  Episode lasting approx 1 min with rapid return to baseline   2 Szs on 3/28 none since  Cont Keppra 1g BID    Cognitive  deficits  Assessment & Plan  Evaluated by SLP and found to have cognitive deficits, recommending SNF  Cognitive deficits likely secondary to SDH  Discussed with friend, he is not at previous baseline    3/23 Will likely need SNF placement, order placed  CM to assist  Accepted by HH  Noncompliance with pt/ot  Encourage activity    3/30: Pending placement.    Alcoholic cirrhosis of liver without ascites (HCC)  Assessment & Plan  Patient reports history of heavy alcohol use, has not drank in few years  Will need GI follow up as outpatient  Denies previous diagnosis of liver disease  Cirrhotic appearing liver on imaging without ascites  Hep panel neg  Ammonia 26    Generalized abdominal pain  Assessment & Plan  No definitive etiology of pain, does have fat containing ventral hernia and diverticulosis without diverticulitis  No evidence of ascites  Continue to monitor  Pain has since resolved    Venous stasis dermatitis of right lower extremity  Assessment & Plan  Does not appear infected on examination  Continue skin care per nursing protocol    Grade IV hemorrhoids  Assessment & Plan  Patient reports chronic hemorrhoids with occasional mild rectal bleeding  lidocaine gel for pain relief        Hyponatremia  Assessment & Plan  Asymptomatic hyponatremia  Hyponatremia worsening 3/22 now down to 126  3/23 stable at 129, cont current management  Likely SIADH  Free water restriction  Due to subdural hematoma will start salt supplementation    3/30: Continue salt tablets and florinef as per neurosurgery recommendations.    Valvular cardiomyopathy (HCC)- (present on admission)  Assessment & Plan  History of bioprosthetic mitral valve replacement    Pacemaker  Assessment & Plan  History of pacemaker limiting ability to obtain MRI    Obesity (BMI 30-39.9)- (present on admission)  Assessment & Plan  Will need weight loss program on discharge  Patient counseled on lifestyle modifications  Will need close follow up as  outpatient.    Benign prostatic hyperplasia without lower urinary tract symptoms- (present on admission)  Assessment & Plan  Continue Flomax  No evidence of urinary obstruction       VTE prophylaxis: SCDs    I certify that the patient requires continued medically necessary hospital services for the treatment of SDH and will remain in the hospital until neurosurgery feels patient is safe for discharge,.  Discharge plan is anticipated to be to SNF.

## 2021-03-31 NOTE — PROGRESS NOTES
Hospital Medicine Daily Progress Note    Date of Service  3/30/2021    Chief Complaint  65 y.o. male admitted 3/12/2021 with altered mentation.    Hospital Course   66yo PMHx ETOH, AFIb, anticoagulation with warfarin, CAD, bioprosthetic MVR, gout, HTN, PPM, DVT/PE, DM.    Friend could not get a hold of pt for 3 days.  EMS called and found pt with R side deficits.  In ED CT demonstrating L wholohemispheric SDH.  INR reversed with KCentra.  Initially treated non operatively however surveilance CT morena 3/22 showed the SDH was enlarging so taken to the OR.  Angiogram on 3/15 neg for vascular abnormalities.    3/25 taken to OR for L craniotomy and evacuation by Dr Hudson  3/28 witnessed Sz      Interval Problem Update  3/30: Patient seen at bedside this morning.  The patient mentions he feels somewhat better.  As per neurosurgery patient can be discharged to SNF once medically cleared.  We are pending recommendations as to when to restart anticoagulation by neurosurgery, we appreciate recommendations.  As per nursing no other overnight events reported.    Consultants/Specialty  Neurosurgery    Code Status  Full Code    Disposition  SNF once medically cleared.    Review of Systems  Review of Systems   Constitutional: Negative for chills and fever.   HENT: Negative for nosebleeds and sore throat.    Eyes: Negative for blurred vision and double vision.   Respiratory: Negative for cough and shortness of breath.    Cardiovascular: Negative for chest pain, palpitations and leg swelling.   Gastrointestinal: Negative for abdominal pain, diarrhea, nausea and vomiting.   Genitourinary: Negative for dysuria and urgency.   Musculoskeletal: Negative for back pain.   Skin: Negative for rash.   Neurological: Positive for weakness and headaches. Negative for dizziness and loss of consciousness.        Physical Exam  Temp:  [36.1 °C (96.9 °F)-36.9 °C (98.4 °F)] 36.4 °C (97.6 °F)  Pulse:  [77-81] 80  Resp:  [16-21] 16  BP: ()/(69-82)  124/82  SpO2:  [96 %-99 %] 98 %    Physical Exam  Vitals reviewed.   Constitutional:       General: He is not in acute distress.     Appearance: Normal appearance. He is well-developed. He is not diaphoretic.   HENT:      Head: Normocephalic and atraumatic.      Comments: L scalp incision noted.  No signs of infection  Eyes:      Conjunctiva/sclera: Conjunctivae normal.   Neck:      Vascular: No JVD.   Cardiovascular:      Rate and Rhythm: Normal rate.      Heart sounds: No murmur. No gallop.    Pulmonary:      Effort: Pulmonary effort is normal. No respiratory distress.      Breath sounds: No stridor. No wheezing or rales.   Abdominal:      Palpations: Abdomen is soft.      Tenderness: There is no abdominal tenderness. There is no guarding or rebound.   Musculoskeletal:         General: No tenderness.      Right lower leg: No edema.      Left lower leg: No edema.   Skin:     General: Skin is warm and dry.      Coloration: Skin is pale.      Findings: No rash.   Neurological:      General: No focal deficit present.      Mental Status: He is alert and oriented to person, place, and time.   Psychiatric:         Thought Content: Thought content normal.         Fluids    Intake/Output Summary (Last 24 hours) at 3/30/2021 1725  Last data filed at 3/29/2021 2000  Gross per 24 hour   Intake 350 ml   Output 900 ml   Net -550 ml       Laboratory  Recent Labs     03/28/21  0535 03/29/21  0445   WBC 6.0 6.6   RBC 3.48* 3.62*   HEMOGLOBIN 11.3* 12.0*   HEMATOCRIT 34.2* 35.7*   MCV 98.3* 98.6*   MCH 32.5 33.1*   MCHC 33.0* 33.6*   RDW 58.4* 58.2*   PLATELETCT 66* 68*   MPV 12.1 11.9     Recent Labs     03/28/21  0535 03/29/21  0445 03/30/21  1120   SODIUM 128* 130* 127*   POTASSIUM 4.8 4.9 4.3   CHLORIDE 96 97 97   CO2 22 25 21   GLUCOSE 159* 121* 137*   BUN 19 21 27*   CREATININE 0.66 0.69 0.72   CALCIUM 8.1* 8.5 8.3*                   Imaging  CT-HEAD W/O   Final Result      1.  There is overall stable residual left frontal  subdural and subarachnoid hemorrhage.   2.  There is left sided hemispheric edema with 3 mm of left-to-right midline shift, previously 4 mm.   3.  There is no new hemorrhage.   4.  There has been slight interval resorption of pneumocephalus with otherwise stable left craniotomy changes.   5.  Focal hypodensity in the inferior left frontal lobe again seen most consistent with an area of evolving ischemia.   6.  Underlying atrophy and white matter disease again noted.      CT-HEAD W/O   Final Result      1.  Stable sequela of left craniotomy, with stable left-sided subarachnoid and subdural hemorrhage.   2.  Residual 4 mm left-to-right midline shift.   3.  No CT evidence of new hemorrhage or large vascular territory infarct.      CT-HEAD W/O   Final Result      Interval left craniotomy with overall decrease in volume of subdural hematoma with some postsurgical extra-axial hemorrhage deep to the craniotomy flap with associated subarachnoid hemorrhage.      Persistent mass effect and midline shift from left-to-right of approximately 5 mm.      CT-HEAD W/O   Final Result      Left extra-axial hematoma is again noted. Subacute blood closer to the vertex along the left frontoparietal lobes is mildly increased compared to prior measuring 1.2 compared to 1 cm.      Mass effect is again noted with 4.6 mm midline shift to the right, slightly increased compared to prior.      CT-ABDOMEN-PELVIS WITH   Final Result      1.  Ascites is no longer identified.      2.  Liver surface is mildly nodular consistent with cirrhosis.      3.  Splenomegaly is again noted. Punctate focus of decreased attenuation in the spleen could be due to normal differential enhancement although small hemangioma is also possible.      4.  Ventral abdominal wall hernia containing abdominal fat is again identified.      5.  Consolidation and volume loss is noted posteriorly in the right lower lobe which could indicate pneumonia.      6.  Diverticulosis.          DX-CHEST-PORTABLE (1 VIEW)   Final Result      Small right pleural effusion.      Right basilar opacities may represent atelectasis or scarring.         CT-HEAD W/O   Final Result      1.  Stable size of the extra-axial hemorrhage overlying the left cerebral hemisphere with sulci effacement and approximately 3 mm of left-to-right midline shift.         IR-CAROTID-CEREBRAL BILATERAL   Final Result         1.  Normal cerebral arteriogram.      2.  No evidence of arteriovenous malformation or intracranial aneurysm formation.      3.  No evidence of atherosclerotic disease in the right common femoral artery.      CT-CTA HEAD WITH & W/O-POST PROCESS   Final Result      No evidence of occlusive lesion or aneurysm.      Multiple tortuous small vessels along the anterior-inferior aspect of the left temporal lobe could represent some type of vascular malformation.      No significant interval change in left-sided holohemispheric subdural hematoma extending along the falx and layering over the tentorium.      CT-CTA NECK WITH & W/O-POST PROCESSING   Final Result      CT angiogram of the neck within normal limits.      CT-HEAD W/O   Final Result         1. Grossly unchanged left hemispheric subdural hematoma, most in the left frontal and temporal region.   2. No new intracranial hemorrhage.                  CT-HEAD W/O   Final Result      1.  Stable large LEFT hemispheric subdural hematoma with associated mass effect and midline shift.   2.  No significant change from prior exam obtained 2 hours earlier.      CT-HEAD W/O   Final Result      Large LEFT holohemispheric subdural hematoma with associated mass effect and 4 mm LEFT to RIGHT midline shift.      These findings were discussed with EFREM FITZGERALD on 3/12/2021 11:40 AM.      DX-CHEST-PORTABLE (1 VIEW)   Final Result      1.  Hypoinflation, improved from prior.   2.  Minimal RIGHT lung base atelectasis or scar.   3.  Minimal RIGHT pleural effusion.            Assessment/Plan  * Subdural hematoma, acute (HCC)  Assessment & Plan  Continues to have headache  Neurosurgery following and recommending continuing steroids and Keppra for seizure prophylaxis. On steroid taper  Repeat CT scan 3/22: 1.2cm extra axial hematoma (from 1cm) with 4.6mm midline shift to right slightly greater compared to prior imaging  Neurosurgery following  PT/OT/SLP following, continue therapy    S/p craniotomy 3/25  Cont Neuro checks  BP control  Sz prophylaxis  PT/OT/SLP    3/30: As per neurosurgery, patient able to be discharged to skilled nursing facility when ready.  Staples and sutures out 2 weeks postop, follow-up with [facility] neurosurgery group in 4 weeks with a new head CT as per neurosurgery note, their office will arrange it.  Neurosurgery has also increased salt tablets to 2 g 3 times daily with Florinef.      History of prosthetic mitral valve  Assessment & Plan  With paroxysmal atrial fibrillation  Anticoagulation reversed given subdural hematoma  Resume anticoagulation when bleeding risk felt to be acceptable by neurosurgery    COPD (chronic obstructive pulmonary disease) ([facility])- (present on admission)  Assessment & Plan  Patient not in acute exacerbation or requiring oxygen  O2/RT protocols    Gout- (present on admission)  Assessment & Plan  Continue allopurinol    Type 2 diabetes mellitus without complication, without long-term current use of insulin ([facility])- (present on admission)  Assessment & Plan  Patient with hyperglycemia worsened by steroids  A1C 5.9  lantus 15 units  SSI  Well controled on current regimen however will likely need to cut back lantus as decadron is tapered off    3/30: We will stop lantus, and continue with ISS. We will monitor and make changes accordingly. Hypoglycemia protocol in place.        Atrial fibrillation (HCC)  Assessment & Plan  Holding anticoagulation in setting of acute SDH  Rate control with metoprolol  Cont Tele monitoring    Seizure  (HCC)  Assessment & Plan  Secondary to SDH  Witnessed event: pt started with aphasia then gaze went upward and fixed with head tremor.  Pt was then unresponsive.  Episode lasting approx 1 min with rapid return to baseline   2 Szs on 3/28 none since  Cont Keppra 1g BID    Thrombocytopenia (HCC)  Assessment & Plan  Likely due to cirrhosis  Continue to monitor    Cognitive deficits  Assessment & Plan  Evaluated by SLP and found to have cognitive deficits, recommending SNF  Cognitive deficits likely secondary to SDH  Discussed with friend, he is not at previous baseline    3/23 Will likely need SNF placement, order placed  CM to assist  Accepted by HH  Noncompliance with pt/ot  Encourage activity    3/30: Pending placement.    Alcoholic cirrhosis of liver without ascites (HCC)  Assessment & Plan  Patient reports history of heavy alcohol use, has not drank in few years  Will need GI follow up as outpatient  Denies previous diagnosis of liver disease  Cirrhotic appearing liver on imaging without ascites  Hep panel neg  Ammonia 26    Generalized abdominal pain  Assessment & Plan  No definitive etiology of pain, does have fat containing ventral hernia and diverticulosis without diverticulitis  No evidence of ascites  Continue to monitor  Pain has since resolved    Venous stasis dermatitis of right lower extremity  Assessment & Plan  Does not appear infected on examination  Continue skin care per nursing protocol    Grade IV hemorrhoids  Assessment & Plan  Patient reports chronic hemorrhoids with occasional mild rectal bleeding  lidocaine gel for pain relief        Hyponatremia  Assessment & Plan  Asymptomatic hyponatremia  Hyponatremia worsening 3/22 now down to 126  3/23 stable at 129, cont current management  Likely SIADH  Free water restriction  Due to subdural hematoma will start salt supplementation    3/30: Continue salt tablets and florinef as per neurosurgery recommendations.    Valvular cardiomyopathy (HCC)-  (present on admission)  Assessment & Plan  History of bioprosthetic mitral valve replacement    Pacemaker  Assessment & Plan  History of pacemaker limiting ability to obtain MRI    Obesity (BMI 30-39.9)- (present on admission)  Assessment & Plan  Will need weight loss program on discharge  Patient counseled on lifestyle modifications  Will need close follow up as outpatient.    Benign prostatic hyperplasia without lower urinary tract symptoms- (present on admission)  Assessment & Plan  Continue Flomax  No evidence of urinary obstruction       VTE prophylaxis: SCDs

## 2021-03-31 NOTE — PROGRESS NOTES
"Neurosurgery Progress Note    Subjective:  No events over night  Pt states he feels \"miserable\", states general aches & feeling of unwell.  States he has not ambulated      Exam:  A/Ox4, slight slurred speech  PERRL, EOMI  +right motor drift.   Appears to have left droop to upper lip, but w/o teeth here and = smile. (RN agreed, but stated noted right droop earlier)  Right 4+/5, Left 5/5  Incision C/d/i with staples      BP  Min: 106/71  Max: 124/82  Pulse  Av  Min: 73  Max: 80  Resp  Av.5  Min: 16  Max: 18  Temp  Av.4 °C (97.6 °F)  Min: 36.2 °C (97.1 °F)  Max: 36.8 °C (98.3 °F)  SpO2  Av.5 %  Min: 96 %  Max: 99 %    No data recorded    Recent Labs     21  0445 21  0305   WBC 6.6 6.9   RBC 3.62* 3.43*   HEMOGLOBIN 12.0* 11.2*   HEMATOCRIT 35.7* 33.3*   MCV 98.6* 97.1   MCH 33.1* 32.7   MCHC 33.6* 33.6*   RDW 58.2* 58.4*   PLATELETCT 68* 60*   MPV 11.9 10.9     Recent Labs     21  0445 21  1120 21  0305   SODIUM 130* 127* 133*   POTASSIUM 4.9 4.3 4.4   CHLORIDE 97 97 102   CO2 25 21 25   GLUCOSE 121* 137* 107*   BUN 21 27* 25*   CREATININE 0.69 0.72 0.80   CALCIUM 8.5 8.3* 8.2*               Intake/Output       21 07 - 21 0659 21 07 - 21 0659       Total  Total       Intake    P.O.  350  480 830  --  -- --    P.O. 350 480 830 -- -- --    Total Intake 350 480 830 -- -- --       Output    Urine  400  550 950  --  -- --    Urine Void (mL) -- 550 550 -- -- --    Output (mL) ([REMOVED] External Urinary Catheter (Condom)) 400 -- 400 -- -- --    Stool  --  -- --  --  -- --    Number of Times Stooled -- 0 x 0 x -- -- --    Total Output 400 550 950 -- -- --       Net I/O     -50 -70 -120 -- -- --            Intake/Output Summary (Last 24 hours) at 3/31/2021 0845  Last data filed at 3/31/2021 0500  Gross per 24 hour   Intake 630 ml   Output 950 ml   Net -320 ml            • sodium chloride  2 g TID WITH MEALS   • " fludrocortisone  0.1 mg QAM   • dexamethasone  2 mg TID    Followed by   • [START ON 4/1/2021] dexamethasone  1 mg TID    Followed by   • [START ON 4/4/2021] dexamethasone  1 mg BID   • levETIRAcetam  1,000 mg BID   • metoprolol tartrate  12.5 mg TWICE DAILY   • lisinopril  2.5 mg Q DAY   • artificial tears  1 Application Q2HRS PRN   • Pharmacy Consult Request  1 Each PHARMACY TO DOSE   • MD ALERT...DO NOT ADMINISTER NSAIDS or ASPIRIN unless ORDERED By Neurosurgery  1 Each PRN   • ondansetron  4 mg Q4HRS PRN   • insulin regular  2-9 Units 4X/DAY ACHS    And   • glucose  16 g Q15 MIN PRN    And   • dextrose 50%  50 mL Q15 MIN PRN   • acetaminophen  650 mg Q6HRS   • allopurinol  100 mg QAM   • tamsulosin  0.4 mg DAILY   • oxyCODONE immediate-release  5 mg Q4HRS PRN    Or   • oxyCODONE immediate-release  10 mg Q4HRS PRN   • morphine injection  2-4 mg Q3HRS PRN   • labetalol  10 mg Q4HRS PRN   • senna-docusate  2 tablet BID    And   • polyethylene glycol/lytes  1 Packet QDAY PRN    And   • magnesium hydroxide  30 mL QDAY PRN    And   • bisacodyl  10 mg QDAY PRN   • Respiratory Therapy Consult   Continuous RT   • LORazepam  2 mg Q5 MIN PRN   • hydrALAZINE  10 mg Q4HRS PRN   • enalaprilat  1.25 mg Q6HRS PRN   • prochlorperazine  10 mg Q6HRS PRN       Assessment and Plan:  POD #4 lLft craniotomy for SDH  NM as above  Continue to increase activity - clear to SNF from our perspective  Prophylactic anticoagulation: no     Start date/time: TBD    Witnessed seizures on 3/28: Keppra up to 1000mg BID  Na 133 today, on replacement & florinef  Continue neuro checks and seizure managements      Head CT 3/30/21 reviewed by Dr. Hudson  Some evidence of ischemic changes in left frontal area but stable CT overall    Pt seen by Dr. Hudson after i saw & now plans to obtain stat head ct, hold transfer (may need sx again) & NO anticoagulation yet - tbd      DC Instructions:  Ambulate as much as comfortable  Ok to shower, pat incision dry,  leave open to air- no dressing  DC staples 2 wks after surgery   No Aspirin or non-steroidal anti-inflammatory medications (aleve, motrin, ibuprofen, celebrex)  No driving  Over the counter stool softeners daily while on narcotics  No strenuous activity  Follow up at [facility] 4 weeks after surgery with a new head ct: our office will arrange

## 2021-04-01 NOTE — PROGRESS NOTES
Oncology/Hematology Progress Note               Author: Vitaliy Wetzel M.D. Date & Time created: 4/1/2021  2:21 PM   Chief complaint-thrombocytopenia, subdural hematoma    Interval History:  He feels about the same.    Review of Systems:  Review of Systems   Constitutional: Negative for fever.   Eyes: Negative for blurred vision.   Respiratory: Negative for cough.    Cardiovascular: Negative for chest pain.   Gastrointestinal: Negative for heartburn.   Musculoskeletal: Positive for neck pain.   Skin: Negative for rash.   Neurological: Positive for focal weakness and headaches.       Physical Exam:  Physical Exam  HENT:      Head:      Comments: Scalp scar with intact staples     Mouth/Throat:      Pharynx: No oropharyngeal exudate.   Cardiovascular:      Rate and Rhythm: Normal rate.   Pulmonary:      Effort: Pulmonary effort is normal.   Skin:     Findings: No rash.   Neurological:      Mental Status: He is alert.      Motor: Weakness present.         Labs:          Recent Labs     03/30/21  1120 03/31/21  0305 04/01/21  0446   SODIUM 127* 133* 134*   POTASSIUM 4.3 4.4 4.5   CHLORIDE 97 102 102   CO2 21 25 25   BUN 27* 25* 27*   CREATININE 0.72 0.80 0.87   MAGNESIUM  --  1.9 2.0   CALCIUM 8.3* 8.2* 8.1*     Recent Labs     03/30/21  1120 03/31/21  0305 04/01/21  0446   ALTSGPT  --  61* 68*   ASTSGOT  --  23 20   ALKPHOSPHAT  --  78 88   TBILIRUBIN  --  0.3 0.3   GLUCOSE 137* 107* 164*     Recent Labs     03/31/21  0305 03/31/21  1343 04/01/21  0446   RBC 3.43* 3.50* 3.46*   HEMOGLOBIN 11.2* 11.5* 11.4*   HEMATOCRIT 33.3* 34.7* 34.5*   PLATELETCT 60* 71* 46*     Recent Labs     03/31/21  0305 03/31/21  1343 04/01/21  0446   WBC 6.9 6.4 5.0   NEUTSPOLYS 82.10* 83.50* 86.80*   LYMPHOCYTES 10.20* 8.70* 9.00*   MONOCYTES 6.30 5.90 2.60   EOSINOPHILS 0.30 0.30 0.00   BASOPHILS 0.10 0.20 0.00   ASTSGOT 23  --  20   ALTSGPT 61*  --  68*   ALKPHOSPHAT 78  --  88   TBILIRUBIN 0.3  --  0.3     Recent Labs     03/30/21  1121  21  0305 21  0446   SODIUM 127* 133* 134*   POTASSIUM 4.3 4.4 4.5   CHLORIDE 97 102 102   CO2 21 25 25   GLUCOSE 137* 107* 164*   BUN 27* 25* 27*   CREATININE 0.72 0.80 0.87   CALCIUM 8.3* 8.2* 8.1*     Hemodynamics:  Temp (24hrs), Av.6 °C (97.8 °F), Min:36.3 °C (97.3 °F), Max:36.8 °C (98.2 °F)  Temperature: 36.8 °C (98.2 °F)  Pulse  Av.7  Min: 34  Max: 92   Blood Pressure : 120/86     Respiratory:    Respiration: 18, Pulse Oximetry: 97 %     Work Of Breathing / Effort: Within Normal Limits  RUL Breath Sounds: Clear, RML Breath Sounds: Clear, RLL Breath Sounds: Clear, ROBERT Breath Sounds: Clear, LLL Breath Sounds: Clear  Fluids:    Intake/Output Summary (Last 24 hours) at 2021 1421  Last data filed at 2021 0900  Gross per 24 hour   Intake --   Output 650 ml   Net -650 ml        GI/Nutrition:  Orders Placed This Encounter   Procedures   • Diet Order Diet: Level 5 - Minced and Moist; Liquid level: Level 0 - Thin; Second Modifier: (optional): Consistent CHO (Diabetic)     Standing Status:   Standing     Number of Occurrences:   1     Order Specific Question:   Diet:     Answer:   Level 5 - Minced and Moist [24]     Order Specific Question:   Liquid level     Answer:   Level 0 - Thin     Order Specific Question:   Second Modifier: (optional)     Answer:   Consistent CHO (Diabetic) [4]     Medical Decision Making, by Problem:  Active Hospital Problems    Diagnosis    • *Subdural hematoma, acute (HCC) [S06.5X9A]    • Thrombocytopenia (HCC) [D69.6]    • History of prosthetic mitral valve [Z95.2]    • COPD (chronic obstructive pulmonary disease) (HCC) [J44.9]    • Gout [M10.9]    • Type 2 diabetes mellitus without complication, without long-term current use of insulin (HCC) [E11.9]    • Atrial fibrillation (HCC) [I48.91]    • Pacemaker [Z95.0]    • Benign prostatic hyperplasia without lower urinary tract symptoms [N40.0]    • Obesity (BMI 30-39.9) [E66.9]    • Seizure (HCC) [R56.9]    • Cognitive  deficits [R41.89]    • Generalized abdominal pain [R10.84]    • Alcoholic cirrhosis of liver without ascites (HCC) [K70.30]    • Hyponatremia [E87.1]    • Grade IV hemorrhoids [K64.3]    • Venous stasis dermatitis of right lower extremity [I87.2]    • Valvular cardiomyopathy (HCC) [I42.8]        Plan:  Isolated thrombocytopenia -   Unclear etiology.  HIT screening positive for PF4 antibodies however overall he has low 4T score .  Cannot anticoagulate given his subdural hematoma.  WBC is and hemoglobin overall stable low yield for any bone marrow biopsy  Repeat CBC this afternoon if platelet count less than 40 transfuse 1 unit to minimize risk of recurrent bleed.  If platelets keeps dropping will consider empiric IVIG however he is already on steroids which should worked if he had ITP.  No DIC, I have asked pathology to review his peripheral smear   Will follow along    Quality-Core Measures

## 2021-04-01 NOTE — PROGRESS NOTES
Neurosurgery Progress Note    Subjective:  No events over night  Pt states he feels much better today in general.       Exam:  A/Ox4, speech intact   PERRL, EOMI  +right motor drift remains  Mild right droop   Right 4++/5, Left 5/5  Incision C/d/i with amy      BP  Min: 102/76  Max: 123/81  Pulse  Av  Min: 77  Max: 80  Resp  Av  Min: 18  Max: 18  Temp  Av.6 °C (97.8 °F)  Min: 36.3 °C (97.3 °F)  Max: 36.8 °C (98.2 °F)  SpO2  Av.8 %  Min: 96 %  Max: 97 %    No data recorded    Recent Labs     21  0305 21  1343 21  0446   WBC 6.9 6.4 5.0   RBC 3.43* 3.50* 3.46*   HEMOGLOBIN 11.2* 11.5* 11.4*   HEMATOCRIT 33.3* 34.7* 34.5*   MCV 97.1 99.1* 99.7*   MCH 32.7 32.9 32.9   MCHC 33.6* 33.1* 33.0*   RDW 58.4* 59.4* 60.0*   PLATELETCT 60* 71* 46*   MPV 10.9 12.1 10.5     Recent Labs     21  1120 21  0305 21  0446   SODIUM 127* 133* 134*   POTASSIUM 4.3 4.4 4.5   CHLORIDE 97 102 102   CO2 21 25 25   GLUCOSE 137* 107* 164*   BUN 27* 25* 27*   CREATININE 0.72 0.80 0.87   CALCIUM 8.3* 8.2* 8.1*               Intake/Output       21 - 21 0659 21 - 21 0659       Total  Total       Intake    Total Intake -- -- -- -- -- --       Output    Urine  200  400 600  50  -- 50    Number of Times Voided -- -- -- 1 x -- 1 x    Urine Void (mL) 200 400 600 50 -- 50    Stool  --  -- --  --  -- --    Number of Times Stooled 1 x -- 1 x -- -- --    Total Output 200 400 600 50 -- 50       Net I/O     -200 -400 -600 -50 -- -50            Intake/Output Summary (Last 24 hours) at 2021 1029  Last data filed at 2021 0900  Gross per 24 hour   Intake --   Output 650 ml   Net -650 ml            • Pharmacy Consult:   PRN   • dexamethasone  4 mg Q6HRS   • sodium chloride  2 g TID WITH MEALS   • fludrocortisone  0.1 mg QAM   • levETIRAcetam  1,000 mg BID   • metoprolol tartrate  12.5 mg TWICE DAILY   • lisinopril  2.5 mg Q DAY    • artificial tears  1 Application Q2HRS PRN   • Pharmacy Consult Request  1 Each PHARMACY TO DOSE   • MD ALERT...DO NOT ADMINISTER NSAIDS or ASPIRIN unless ORDERED By Neurosurgery  1 Each PRN   • ondansetron  4 mg Q4HRS PRN   • insulin regular  2-9 Units 4X/DAY ACHS    And   • glucose  16 g Q15 MIN PRN    And   • dextrose 50%  50 mL Q15 MIN PRN   • acetaminophen  650 mg Q6HRS   • tamsulosin  0.4 mg DAILY   • oxyCODONE immediate-release  5 mg Q4HRS PRN    Or   • oxyCODONE immediate-release  10 mg Q4HRS PRN   • morphine injection  2-4 mg Q3HRS PRN   • labetalol  10 mg Q4HRS PRN   • senna-docusate  2 tablet BID    And   • polyethylene glycol/lytes  1 Packet QDAY PRN    And   • magnesium hydroxide  30 mL QDAY PRN    And   • bisacodyl  10 mg QDAY PRN   • Respiratory Therapy Consult   Continuous RT   • LORazepam  2 mg Q5 MIN PRN   • hydrALAZINE  10 mg Q4HRS PRN   • enalaprilat  1.25 mg Q6HRS PRN   • prochlorperazine  10 mg Q6HRS PRN       Assessment and Plan:  POD # 5 Left craniotomy for SDH  NM as above  Continue to increase activity - clear to SNF from our perspective  Prophylactic anticoagulation: no     Start date/time: TBD    Witnessed seizures on 3/28: Keppra up to 1000mg BID  Na 133 today, on replacement & florinef  Continue neuro checks and seizure managements  Awaiting brain mri - pt with pacer  Plts <50k, i reviewed notes with this regard

## 2021-04-01 NOTE — CARE PLAN
Problem: Knowledge Deficit  Goal: Knowledge of disease process/condition, treatment plan, diagnostic tests, and medications will improve  Outcome: PROGRESSING AS EXPECTED     Problem: Skin Integrity  Goal: Risk for impaired skin integrity will decrease  Outcome: PROGRESSING AS EXPECTED  Intervention: Assess risk factors for impaired skin integrity and/or pressure ulcers  Note: Q 2 turns are in place, mepilex in use.      Problem: Pain Management  Goal: Pain level will decrease to patient's comfort goal  Outcome: PROGRESSING AS EXPECTED     Problem: Venous Thromboembolism (VTW)/Deep Vein Thrombosis (DVT) Prevention:  Goal: Patient will participate in Venous Thrombosis (VTE)/Deep Vein Thrombosis (DVT)Prevention Measures  Outcome: PROGRESSING AS EXPECTED

## 2021-04-01 NOTE — PROGRESS NOTES
Louisa from Lab called with critical result of Platelets of 46 at 0556. Critical lab result read back to Louisa.   Dr. Sin notified of critical lab result at 0605.  Critical lab result read back by Dr. Sin.

## 2021-04-01 NOTE — CARE PLAN
Problem: Safety  Goal: Will remain free from injury  Outcome: PROGRESSING AS EXPECTED  Note: PT educated to call for assistance, Non-skid socks, bed alarm, be din lowest position, X3 side rails up, Frequent toileting provided, call light provided. Will continue to monitor Q2 hr rounding.  Q4 neuro checks per stroke protocol In place.      Problem: Communication:  Goal: The ability to communicate needs accurately and effectively will improve  Outcome: PROGRESSING AS EXPECTED  Note: Pt A&OX4, Q4 neuro Checks per stroke protocol  in place. Follows commands and responds appropriately, will continue to round Q2 hours, encourage the Pt to ask questions and voice feelings.       Problem: Safety:  Goal: Will remain free from injury  Outcome: PROGRESSING AS EXPECTED  Note: PT educated to call for assistance, Non-skid socks, bed alarm, be din lowest position, X3 side rails up, Frequent toileting provided, call light provided. Will continue to monitor Q2 hr rounding.  Q4 neuro checks per stroke protocol In place.

## 2021-04-01 NOTE — CARE PLAN
Problem: Skin Integrity  Goal: Risk for impaired skin integrity will decrease  Outcome: PROGRESSING AS EXPECTED  Note: Encouraging pt to turn and reposition [id number]. Waffle cushion in place.     Problem: Safety  Goal: Will remain free from injury  Outcome: PROGRESSING AS EXPECTED  Note: Limiting activity to prevent accidental bleeding due to pts critically low platelets.      Problem: Safety  Goal: Will remain free from falls  Outcome: PROGRESSING AS EXPECTED  Note: Fall precautions in place.

## 2021-04-01 NOTE — PROGRESS NOTES
Monitor summary: SR 72-97, [place] 0.16, QRS 0.08, QT 0.42, with rare PACs  pause per strip from monitor room.

## 2021-04-02 NOTE — PROGRESS NOTES
Oncology/Hematology Progress Note               Author: Vitaliy Wetzel M.D. Date & Time created: 4/2/2021  3:08 PM   Chief complaint-thrombocytopenia, subdural hematoma    Interval History:  He feels about the same.  Platelet count in the low 40s.  Keppra was switched to Vimpat.    Review of Systems:  Review of Systems   Constitutional: Negative for fever.   Eyes: Negative for blurred vision.   Respiratory: Negative for cough.    Cardiovascular: Negative for chest pain.   Gastrointestinal: Negative for heartburn.   Musculoskeletal: Positive for neck pain.   Skin: Negative for rash.   Neurological: Positive for focal weakness and headaches.       Physical Exam:  Physical Exam  HENT:      Head:      Comments: Scalp scar with intact staples     Mouth/Throat:      Pharynx: No oropharyngeal exudate.   Cardiovascular:      Rate and Rhythm: Normal rate.   Pulmonary:      Effort: Pulmonary effort is normal.   Skin:     Findings: No rash.   Neurological:      Mental Status: He is alert.      Motor: Weakness present.         Labs:          Recent Labs     03/31/21 0305 04/01/21 0446 04/02/21 0319   SODIUM 133* 134* 136   POTASSIUM 4.4 4.5 4.8   CHLORIDE 102 102 106   CO2 25 25 25   BUN 25* 27* 25*   CREATININE 0.80 0.87 0.84   MAGNESIUM 1.9 2.0 2.0   CALCIUM 8.2* 8.1* 8.3*     Recent Labs     03/31/21 0305 04/01/21 0446 04/02/21 0319   ALTSGPT 61* 68* 76*   ASTSGOT 23 20 22   ALKPHOSPHAT 78 88 79   TBILIRUBIN 0.3 0.3 0.3   GLUCOSE 107* 164* 154*     Recent Labs     04/01/21 0446 04/01/21 1853 04/02/21 0319   RBC 3.46* 3.43* 3.37*   HEMOGLOBIN 11.4* 11.4* 11.0*   HEMATOCRIT 34.5* 34.3* 33.4*   PLATELETCT 46* 41* 43*     Recent Labs     03/31/21 0305 03/31/21 1343 04/01/21 0446 04/01/21 1853 04/02/21 0319   WBC 6.9   < > 5.0 5.3 4.7*   NEUTSPOLYS 82.10*   < > 86.80* 87.20* 86.80*   LYMPHOCYTES 10.20*   < > 9.00* 7.50* 8.50*   MONOCYTES 6.30   < > 2.60 3.40 3.40   EOSINOPHILS 0.30   < > 0.00 0.20 0.00   BASOPHILS  0.10   < > 0.00 0.20 0.00   ASTSGOT 23  --  20  --  22   ALTSGPT 61*  --  68*  --  76*   ALKPHOSPHAT 78  --  88  --  79   TBILIRUBIN 0.3  --  0.3  --  0.3    < > = values in this interval not displayed.     Recent Labs     21  0305 21  0446 21  0319   SODIUM 133* 134* 136   POTASSIUM 4.4 4.5 4.8   CHLORIDE 102 102 106   CO2 25 25 25   GLUCOSE 107* 164* 154*   BUN 25* 27* 25*   CREATININE 0.80 0.87 0.84   CALCIUM 8.2* 8.1* 8.3*     Hemodynamics:  Temp (24hrs), Av.3 °C (97.3 °F), Min:36.2 °C (97.1 °F), Max:36.5 °C (97.7 °F)  Temperature: 36.2 °C (97.1 °F)  Pulse  Av.7  Min: 34  Max: 92   Blood Pressure : 104/67     Respiratory:    Respiration: 17, Pulse Oximetry: 97 %     Work Of Breathing / Effort: Within Normal Limits  RUL Breath Sounds: Clear;Diminished, RML Breath Sounds: Clear;Diminished, RLL Breath Sounds: Clear;Diminished, ROBERT Breath Sounds: Clear;Diminished, LLL Breath Sounds: Clear;Diminished  Fluids:    Intake/Output Summary (Last 24 hours) at 2021 1421  Last data filed at 2021 0900  Gross per 24 hour   Intake --   Output 650 ml   Net -650 ml        GI/Nutrition:  Orders Placed This Encounter   Procedures   • Diet Order Diet: Level 5 - Minced and Moist; Liquid level: Level 0 - Thin; Second Modifier: (optional): Consistent CHO (Diabetic)     Standing Status:   Standing     Number of Occurrences:   1     Order Specific Question:   Diet:     Answer:   Level 5 - Minced and Moist [24]     Order Specific Question:   Liquid level     Answer:   Level 0 - Thin     Order Specific Question:   Second Modifier: (optional)     Answer:   Consistent CHO (Diabetic) [4]     Medical Decision Making, by Problem:  Active Hospital Problems    Diagnosis    • *Subdural hematoma, acute (HCC) [S06.5X9A]    • Thrombocytopenia (HCC) [D69.6]    • History of prosthetic mitral valve [Z95.2]    • COPD (chronic obstructive pulmonary disease) (HCC) [J44.9]    • Gout [M10.9]    • Type 2 diabetes mellitus  without complication, without long-term current use of insulin (HCC) [E11.9]    • Atrial fibrillation (HCC) [I48.91]    • Pacemaker [Z95.0]    • Benign prostatic hyperplasia without lower urinary tract symptoms [N40.0]    • Obesity (BMI 30-39.9) [E66.9]    • Seizure (HCC) [R56.9]    • Cognitive deficits [R41.89]    • Generalized abdominal pain [R10.84]    • Alcoholic cirrhosis of liver without ascites (HCC) [K70.30]    • Hyponatremia [E87.1]    • Grade IV hemorrhoids [K64.3]    • Venous stasis dermatitis of right lower extremity [I87.2]    • Valvular cardiomyopathy (HCC) [I42.8]        Plan:  Isolated thrombocytopenia -   Unclear etiology.  HIT screening positive for PF4 antibodies however overall he has low 4T score .  [facility] pending  Cannot anticoagulate given his subdural hematoma.  WBC is and hemoglobin overall stable- low yield for any bone marrow biopsy  Platelet count are plateauing around 40,000 area.  Repeat CBC in the evening and transfuse 1 unit of platelets start trending below 40.  If platelets keeps dropping will consider empiric IVIG however he is already on steroids which should worked if he had ITP.  No DIC,  Keppra has been switched to Vimpat.  He is off of allopurinol.  No need for acute intervention.  Monitor closely for any symptoms of increased IC bleed  Will follow along    Quality-Core Measures

## 2021-04-02 NOTE — PROGRESS NOTES
1949: Critical platelet value of 41 received from , [place]. Dr De Jesus paged, returned page, updated on patient history and current status as well as decrease in PLT from 46 to 41 with standing orders to transfuse for PLT <40. No new orders received, MD aware of additional orders to recheck CBC in AM  0415: Phone call received from lab regarding critical PLT value of 43. Per hematology note, to transfuse if <40. Hospitalist paged to be made aware. Returned page from Dr Merrick MD aware of critical lab value, per hematology note will consider empiric IVIG, will ensure value is passed on during report

## 2021-04-02 NOTE — PROGRESS NOTES
Took over care of pt, received report from Chyna. Fall, aspiration and seizure precautions in place. Medications administered per MAR.

## 2021-04-02 NOTE — THERAPY
Missed Therapy     Patient Name: Morales Olivier  Age:  65 y.o., Sex:  male  Medical Record #: [id number]  Today's Date: 4/2/2021    Discussed missed therapy with RN.    Attempted to see pt for OT tx. Per RN pt w/ low platelets. Will try again later as able.

## 2021-04-02 NOTE — CARE PLAN
Problem: Skin Integrity  Goal: Risk for impaired skin integrity will decrease  Outcome: PROGRESSING AS EXPECTED  Note: Pt is encouraged to turn and reposition himself every 2 hours.     Problem: Safety  Goal: Will remain free from falls  Outcome: PROGRESSING AS EXPECTED  Note: Patient educated on fall risk and call light use. Patient A+Ox4, verbalizes understanding of calling for assistance before getting out of bed. Bed is locked and in low position, patient has non slip socks, his call light is within reach, bed alarm is on. Fall precautions in place.

## 2021-04-02 NOTE — PROGRESS NOTES
[facility] Medicine Daily Progress Note    Date of Service  4/2/2021    Chief Complaint  65 y.o. male admitted 3/12/2021 with altered mentation.    Hospital Course   64yo PMHx ETOH, AFIb, anticoagulation with warfarin, CAD, bioprosthetic MVR, gout, HTN, PPM, DVT/PE, DM.    Friend could not get a hold of pt for 3 days.  EMS called and found pt with R side deficits.  In ED CT demonstrating L wholohemispheric SDH.  INR reversed with KCentra.  Initially treated non operatively however surveilance CT morena 3/22 showed the SDH was enlarging so taken to the OR.  Angiogram on 3/15 neg for vascular abnormalities.    3/25 taken to OR for L craniotomy and evacuation by Dr Hudson  3/28 witnessed Sz      Interval Problem Update  3/30: Patient seen at bedside this morning.  The patient mentions he feels somewhat better.  As per neurosurgery patient can be discharged to SNF once medically cleared.  We are pending recommendations as to when to restart anticoagulation by neurosurgery, we appreciate recommendations.  As per nursing no other overnight events reported.    3/31: Patient seen at bedside this morning.  The patient seems more lethargic.  Neurosurgery in the room at the same time as my evaluation, they ordered a CT scan which did not reveal acute abnormality.  It would like an MRI of the brain, however the patient with a pacemaker, we will inquire possible to do the MRI.  We have also consulted hematology due to ongoing thrombocytopenia.  As per nursing no other overnight events were reported.    4/1: Patient seen at bedside this morning, it appears patient is more awake today.  Unable to perform MRI due to patient's pacemaker.  Platelets this morning were 46, we will repeat lipids in the afternoon and if less than 40 we will transfuse 1 unit of platelets.  Hematology following pending ongoing work-up.  As per nursing no other overnight events were reported.    4/2: Patient seen at bedside this morning.  Platelets this morning  were 43, as per hematology's recommendation we will transfuse platelets if they become below 40.  We will repeat CBC later today.  We are pending thrombocytopenia work-up including [facility].  We appreciate further recommendations by hematology.  We have started Decadron tapering as per neurosurgery's recommendation.  No other overnight events reported by nursing.    Consultants/Specialty  Neurosurgery    Code Status  Full Code    Disposition  SNF once medically cleared.    Review of Systems  Review of Systems   Constitutional: Negative for chills and fever.   HENT: Negative for nosebleeds and sore throat.    Eyes: Negative for blurred vision and double vision.   Respiratory: Negative for cough and shortness of breath.    Cardiovascular: Negative for chest pain, palpitations and leg swelling.   Gastrointestinal: Negative for abdominal pain, diarrhea, nausea and vomiting.   Genitourinary: Negative for dysuria and urgency.   Musculoskeletal: Negative for back pain.   Skin: Negative for rash.   Neurological: Positive for weakness and headaches. Negative for dizziness and loss of consciousness.        Physical Exam  Temp:  [36.2 °C (97.1 °F)-36.5 °C (97.7 °F)] 36.2 °C (97.1 °F)  Pulse:  [74-81] 80  Resp:  [16-18] 17  BP: (104-152)/(67-92) 104/67  SpO2:  [96 %-100 %] 97 %    Physical Exam  Vitals reviewed.   Constitutional:       General: He is not in acute distress.     Appearance: Normal appearance. He is well-developed. He is not diaphoretic.   HENT:      Head: Normocephalic and atraumatic.      Comments: L scalp incision noted.  No signs of infection  Eyes:      Conjunctiva/sclera: Conjunctivae normal.   Neck:      Vascular: No JVD.   Cardiovascular:      Rate and Rhythm: Normal rate.      Heart sounds: No murmur. No gallop.    Pulmonary:      Effort: Pulmonary effort is normal. No respiratory distress.      Breath sounds: No stridor. No wheezing or rales.   Abdominal:      Palpations: Abdomen is soft.      Tenderness:  There is no abdominal tenderness. There is no guarding or rebound.      Comments: Abdominal bruise, appears old, and patient states it has been there a while. Denies pain.   Musculoskeletal:         General: No tenderness.      Right lower leg: No edema.      Left lower leg: No edema.   Skin:     General: Skin is warm and dry.      Findings: No rash.   Neurological:      General: No focal deficit present.      Mental Status: He is alert and oriented to person, place, and time.   Psychiatric:         Thought Content: Thought content normal.         Fluids    Intake/Output Summary (Last 24 hours) at 4/2/2021 1426  Last data filed at 4/2/2021 0800  Gross per 24 hour   Intake 787.5 ml   Output 1075 ml   Net -287.5 ml       Laboratory  Recent Labs     04/01/21  0446 04/01/21  1853 04/02/21  0319   WBC 5.0 5.3 4.7*   RBC 3.46* 3.43* 3.37*   HEMOGLOBIN 11.4* 11.4* 11.0*   HEMATOCRIT 34.5* 34.3* 33.4*   MCV 99.7* 100.0* 99.1*   MCH 32.9 33.2* 32.6   MCHC 33.0* 33.2* 32.9*   RDW 60.0* 59.5* 59.3*   PLATELETCT 46* 41* 43*   MPV 10.5 11.8 12.4     Recent Labs     03/31/21  0305 04/01/21  0446 04/02/21  0319   SODIUM 133* 134* 136   POTASSIUM 4.4 4.5 4.8   CHLORIDE 102 102 106   CO2 25 25 25   GLUCOSE 107* 164* 154*   BUN 25* 27* 25*   CREATININE 0.80 0.87 0.84   CALCIUM 8.2* 8.1* 8.3*                   Imaging  CT-HEAD W/O   Final Result      No significant interval change from one day prior.      CT-HEAD W/O   Final Result      1.  There is overall stable residual left frontal subdural and subarachnoid hemorrhage.   2.  There is left sided hemispheric edema with 3 mm of left-to-right midline shift, previously 4 mm.   3.  There is no new hemorrhage.   4.  There has been slight interval resorption of pneumocephalus with otherwise stable left craniotomy changes.   5.  Focal hypodensity in the inferior left frontal lobe again seen most consistent with an area of evolving ischemia.   6.  Underlying atrophy and white matter disease  again noted.      CT-HEAD W/O   Final Result      1.  Stable sequela of left craniotomy, with stable left-sided subarachnoid and subdural hemorrhage.   2.  Residual 4 mm left-to-right midline shift.   3.  No CT evidence of new hemorrhage or large vascular territory infarct.      CT-HEAD W/O   Final Result      Interval left craniotomy with overall decrease in volume of subdural hematoma with some postsurgical extra-axial hemorrhage deep to the craniotomy flap with associated subarachnoid hemorrhage.      Persistent mass effect and midline shift from left-to-right of approximately 5 mm.      CT-HEAD W/O   Final Result      Left extra-axial hematoma is again noted. Subacute blood closer to the vertex along the left frontoparietal lobes is mildly increased compared to prior measuring 1.2 compared to 1 cm.      Mass effect is again noted with 4.6 mm midline shift to the right, slightly increased compared to prior.      CT-ABDOMEN-PELVIS WITH   Final Result      1.  Ascites is no longer identified.      2.  Liver surface is mildly nodular consistent with cirrhosis.      3.  Splenomegaly is again noted. Punctate focus of decreased attenuation in the spleen could be due to normal differential enhancement although small hemangioma is also possible.      4.  Ventral abdominal wall hernia containing abdominal fat is again identified.      5.  Consolidation and volume loss is noted posteriorly in the right lower lobe which could indicate pneumonia.      6.  Diverticulosis.         DX-CHEST-PORTABLE (1 VIEW)   Final Result      Small right pleural effusion.      Right basilar opacities may represent atelectasis or scarring.         CT-HEAD W/O   Final Result      1.  Stable size of the extra-axial hemorrhage overlying the left cerebral hemisphere with sulci effacement and approximately 3 mm of left-to-right midline shift.         IR-CAROTID-CEREBRAL BILATERAL   Final Result         1.  Normal cerebral arteriogram.      2.  No  evidence of arteriovenous malformation or intracranial aneurysm formation.      3.  No evidence of atherosclerotic disease in the right common femoral artery.      CT-CTA HEAD WITH & W/O-POST PROCESS   Final Result      No evidence of occlusive lesion or aneurysm.      Multiple tortuous small vessels along the anterior-inferior aspect of the left temporal lobe could represent some type of vascular malformation.      No significant interval change in left-sided holohemispheric subdural hematoma extending along the falx and layering over the tentorium.      CT-CTA NECK WITH & W/O-POST PROCESSING   Final Result      CT angiogram of the neck within normal limits.      CT-HEAD W/O   Final Result         1. Grossly unchanged left hemispheric subdural hematoma, most in the left frontal and temporal region.   2. No new intracranial hemorrhage.                  CT-HEAD W/O   Final Result      1.  Stable large LEFT hemispheric subdural hematoma with associated mass effect and midline shift.   2.  No significant change from prior exam obtained 2 hours earlier.      CT-HEAD W/O   Final Result      Large LEFT holohemispheric subdural hematoma with associated mass effect and 4 mm LEFT to RIGHT midline shift.      These findings were discussed with EFREM FITZGERALD on 3/12/2021 11:40 AM.      DX-CHEST-PORTABLE (1 VIEW)   Final Result      1.  Hypoinflation, improved from prior.   2.  Minimal RIGHT lung base atelectasis or scar.   3.  Minimal RIGHT pleural effusion.           Assessment/Plan  * Subdural hematoma, acute (HCC)  Assessment & Plan  Continues to have headache  Neurosurgery following and recommending continuing steroids and Keppra for seizure prophylaxis. On steroid taper  Repeat CT scan 3/22: 1.2cm extra axial hematoma (from 1cm) with 4.6mm midline shift to right slightly greater compared to prior imaging  Neurosurgery following  PT/OT/SLP following, continue therapy    S/p craniotomy 3/25  Cont Neuro checks  BP  control  Sz prophylaxis  PT/OT/SLP    3/30: As per neurosurgery, patient able to be discharged to skilled nursing facility when ready.  Staples and sutures out 2 weeks postop, follow-up with [facility] neurosurgery group in 4 weeks with a new head CT as per neurosurgery note, their office will arrange it.  Neurosurgery has also increased salt tablets to 2 g 3 times daily with Florinef.    3/31: Patient seems more lethargic this morning.  Neurosurgery repeated CT scan which did not reveal acute abnormality.  Neurosurgery would like to have an MRI of the brain, patient with pacemaker we will inquire if it is compatible with MRI machine.  Neurosurgery also recommends starting the patient on Decadron 4 mg every 6 hours for now.    4/1: Patient seems to be more awake today.  Unable to do MRI due to pacemaker.  We have switched Keppra to Vimpat due to Keppra possibly causing thrombocytopenia.  I talked to Dr. Aranda from neurology and he recommended to start Vimpat at 100 mg twice daily.    4/2: We have started the tapering of Decadron as per neurosurgery's recommendation.      Thrombocytopenia (HCC)  Assessment & Plan  Likely due to cirrhosis  Continue to monitor    3/31: While thrombocytopenia could have a component of cirrhosis.  The patient's platelet count on 18 March was 221.  We have consulted hematology for further recommendations.      4/1: Hematology evaluating the patient.  We have sent HIT antibodies and [facility], we will require [facility] confirmation before starting argatroban.  Hematology would like to switch Keppra to a different antiseizure medication, and we have talked with neurology who recommended Vimpat.  Platelets this morning were 46, as per hematology we will repeat CBC this afternoon and if less than 40 we will transfuse 1 unit of platelets.    4/2: Platelets this morning were 43.  We will repeat CBC later today again.  If platelets less than 40 we will transfuse 1 unit of platelets as per hematology's  recommendation.  We are pending [facility] results.  We appreciate further recommendations by hematology.    History of prosthetic mitral valve  Assessment & Plan  With paroxysmal atrial fibrillation  Anticoagulation reversed given subdural hematoma  Resume anticoagulation when bleeding risk felt to be acceptable by neurosurgery    COPD (chronic obstructive pulmonary disease) ([facility])- (present on admission)  Assessment & Plan  Patient not in acute exacerbation or requiring oxygen  O2/RT protocols    Gout- (present on admission)  Assessment & Plan  Continue allopurinol    Due to ongoing thrombocytopenia and as per hematology's recommendation we will hold allopurinol for now.    Type 2 diabetes mellitus without complication, without long-term current use of insulin ([facility])- (present on admission)  Assessment & Plan  Patient with hyperglycemia worsened by steroids  A1C 5.9  lantus 15 units  SSI  Well controled on current regimen however will likely need to cut back lantus as decadron is tapered off    3/30: We will stop lantus, and continue with ISS. We will monitor and make changes accordingly. Hypoglycemia protocol in place.    3/31: As per neurosurgery we have started the patient on Decadron 4 mg every 6 hours, we will monitor glucose closely if needed we will restart Lantus.        Atrial fibrillation ([facility])  Assessment & Plan  Holding anticoagulation in setting of acute SDH  Rate control with metoprolol  Cont Tele monitoring    3/31: As per neurosurgery patient to resume anticoagulation in 2 weeks, unless he requires new neurosurgical intervention.    Seizure ([facility])  Assessment & Plan  Secondary to SDH  Witnessed event: pt started with aphasia then gaze went upward and fixed with head tremor.  Pt was then unresponsive.  Episode lasting approx 1 min with rapid return to baseline   2 Szs on 3/28 none since  Cont Keppra 1g BID    4/1: Due to ongoing thrombocytopenia as per hematology Keppra can sometimes cause low platelets, so  we have switched to Vimpat as per neurology's recommendation.    Cognitive deficits  Assessment & Plan  Evaluated by SLP and found to have cognitive deficits, recommending SNF  Cognitive deficits likely secondary to SDH  Discussed with friend, he is not at previous baseline    3/23 Will likely need SNF placement, order placed  CM to assist  Accepted by HH  Noncompliance with pt/ot  Encourage activity    4/2: Pending placement.    Alcoholic cirrhosis of liver without ascites (HCC)  Assessment & Plan  Patient reports history of heavy alcohol use, has not drank in few years  Will need GI follow up as outpatient  Denies previous diagnosis of liver disease  Cirrhotic appearing liver on imaging without ascites  Hep panel neg  Ammonia 26    Generalized abdominal pain  Assessment & Plan  No definitive etiology of pain, does have fat containing ventral hernia and diverticulosis without diverticulitis  No evidence of ascites  Continue to monitor  Pain has since resolved    Venous stasis dermatitis of right lower extremity  Assessment & Plan  Does not appear infected on examination  Continue skin care per nursing protocol    Grade IV hemorrhoids  Assessment & Plan  Patient reports chronic hemorrhoids with occasional mild rectal bleeding  lidocaine gel for pain relief        Hyponatremia  Assessment & Plan  Asymptomatic hyponatremia  Hyponatremia worsening 3/22 now down to 126  3/23 stable at 129, cont current management  Likely SIADH  Free water restriction  Due to subdural hematoma will start salt supplementation    4/2: Continue salt tablets and florinef as per neurosurgery recommendations.    Valvular cardiomyopathy (HCC)- (present on admission)  Assessment & Plan  History of bioprosthetic mitral valve replacement    Pacemaker  Assessment & Plan  History of pacemaker limiting ability to obtain MRI    Obesity (BMI 30-39.9)- (present on admission)  Assessment & Plan  Will need weight loss program on discharge  Patient  counseled on lifestyle modifications  Will need close follow up as outpatient.    Benign prostatic hyperplasia without lower urinary tract symptoms- (present on admission)  Assessment & Plan  Continue Flomax  No evidence of urinary obstruction       VTE prophylaxis: SCDs    I certify that the patient requires continued medically necessary hospital services for the treatment of SDH and will remain in the hospital until neurosurgery feels patient is safe for discharge,.  Discharge plan is anticipated to be to SNF.

## 2021-04-02 NOTE — DISCHARGE PLANNING
Anticipated Discharge Disposition: SNF    Action: Lsw met with pt at bedside per pt's request. Pt stated that he would like assistance when he experience his stroke, he soiled his mattress and he does not have any funds to buy a new one. Pt stated that he would like as much resources as possible as he is on a fixed income. Lsw explained that if pt was on services with [facility], they have a SW who should assist with resources. Lsw explained that they will reach out to community agencies for resources.     Barriers to Discharge: medical clearance    Plan: Follow up with resources for pt and have numbers accessible for pt   -[facility] (HCBS waiver, FE waiver, OD waiver, MICHELLE, PAS)  -[facility]  -[facility]  -[facility]  -[facility] [phone]  -[facility] [phone]

## 2021-04-02 NOTE — THERAPY
Missed Therapy     Patient Name: Morales Olivier  Age:  65 y.o., Sex:  male  Medical Record #: [id number]  Today's Date: 4/1/2021    Discussed with nsg, hold mobility 2/2 low platelets. Will re-attempt PT tx as appropriate.    Farida Mendez, PT, DPT  Ext. [phone]

## 2021-04-02 NOTE — PROGRESS NOTES
Neurosurgery Progress Note    Subjective:  No events over night  Pt states he doesn't feel very good. C/O h/a and upset stomache         Exam:  A/Ox4, speech intact   PERRL, EOMI  +right motor drift remains  Mild right droop   Right 4++/5, Left 5/5  Incision C/d/i with amy      BP  Min: 119/82  Max: 152/79  Pulse  Av  Min: 74  Max: 81  Resp  Av  Min: 16  Max: 18  Temp  Av.3 °C (97.3 °F)  Min: 36.2 °C (97.1 °F)  Max: 36.5 °C (97.7 °F)  SpO2  Av %  Min: 96 %  Max: 100 %    No data recorded    Recent Labs     21   WBC 5.0 5.3 4.7*   RBC 3.46* 3.43* 3.37*   HEMOGLOBIN 11.4* 11.4* 11.0*   HEMATOCRIT 34.5* 34.3* 33.4*   MCV 99.7* 100.0* 99.1*   MCH 32.9 33.2* 32.6   MCHC 33.0* 33.2* 32.9*   RDW 60.0* 59.5* 59.3*   PLATELETCT 46* 41* 43*   MPV 10.5 11.8 12.4     Recent Labs     21  0305 21   SODIUM 133* 134* 136   POTASSIUM 4.4 4.5 4.8   CHLORIDE 102 102 106   CO2 25 25 25   GLUCOSE 107* 164* 154*   BUN 25* 27* 25*   CREATININE 0.80 0.87 0.84   CALCIUM 8.2* 8.1* 8.3*               Intake/Output       21 - 21 - 21 Total  Total       Intake    P.O.  --  240 240  240  -- 240    P.O. -- 240 240 240 -- 240    I.V.  --  307.5 307.5  --  -- --    Volume (mL) (NS infusion) -- 307.5 307.5 -- -- --    Total Intake -- 547.5 547.5 240 -- 240       Output    Urine  450  775 1225  100  -- 100    Number of Times Voided 1 x -- 1 x 1 x -- 1 x    Urine Void (mL)  100 -- 100    Stool  --  -- --  --  -- --    Number of Times Stooled -- 1 x 1 x -- -- --    Total Output  100 -- 100       Net I/O     -450 -227.5 -677.5 140 -- 140            Intake/Output Summary (Last 24 hours) at 2021 0942  Last data filed at 2021 0800  Gross per 24 hour   Intake 787.5 ml   Output 1275 ml   Net -487.5 ml            • lacosamide  100 mg BID   •  Pharmacy Consult:   PRN   • dexamethasone  4 mg Q6HRS   • sodium chloride  2 g TID WITH MEALS   • fludrocortisone  0.1 mg QAM   • metoprolol tartrate  12.5 mg TWICE DAILY   • lisinopril  2.5 mg Q DAY   • artificial tears  1 Application Q2HRS PRN   • Pharmacy Consult Request  1 Each PHARMACY TO DOSE   • MD ALERT...DO NOT ADMINISTER NSAIDS or ASPIRIN unless ORDERED By Neurosurgery  1 Each PRN   • ondansetron  4 mg Q4HRS PRN   • insulin regular  2-9 Units 4X/DAY ACHS    And   • glucose  16 g Q15 MIN PRN    And   • dextrose 50%  50 mL Q15 MIN PRN   • acetaminophen  650 mg Q6HRS   • tamsulosin  0.4 mg DAILY   • oxyCODONE immediate-release  5 mg Q4HRS PRN    Or   • oxyCODONE immediate-release  10 mg Q4HRS PRN   • morphine injection  2-4 mg Q3HRS PRN   • labetalol  10 mg Q4HRS PRN   • senna-docusate  2 tablet BID    And   • polyethylene glycol/lytes  1 Packet QDAY PRN    And   • magnesium hydroxide  30 mL QDAY PRN    And   • bisacodyl  10 mg QDAY PRN   • Respiratory Therapy Consult   Continuous RT   • LORazepam  2 mg Q5 MIN PRN   • hydrALAZINE  10 mg Q4HRS PRN   • enalaprilat  1.25 mg Q6HRS PRN   • prochlorperazine  10 mg Q6HRS PRN       Assessment and Plan:  POD # 6 Left craniotomy for SDH  NM as above  DC staples 4/10/21  Continue to increase activity - clear to SNF from our perspective  Prophylactic anticoagulation: no     Start date/time: TBD    Witnessed seizures on 3/28: Keppra up to 1000mg BID  Na 133 today, on replacement & florinef  Continue neuro checks and seizure managements  Awaiting brain mri - pt with pacer. Per Dr Hudson will cxl this  Plts <50k, i reviewed notes with this regard

## 2021-04-02 NOTE — PROGRESS NOTES
Dahiana from Lab called with critical result of platelets at 41. Critical lab result read back to Autumn. Primary RN, Federica updated to call hospitalist with critical results.

## 2021-04-02 NOTE — PROGRESS NOTES
[facility] Medicine Daily Progress Note    Date of Service  4/1/2021    Chief Complaint  65 y.o. male admitted 3/12/2021 with altered mentation.    Hospital Course   64yo PMHx ETOH, AFIb, anticoagulation with warfarin, CAD, bioprosthetic MVR, gout, HTN, PPM, DVT/PE, DM.    Friend could not get a hold of pt for 3 days.  EMS called and found pt with R side deficits.  In ED CT demonstrating L wholohemispheric SDH.  INR reversed with KCentra.  Initially treated non operatively however surveilance CT morena 3/22 showed the SDH was enlarging so taken to the OR.  Angiogram on 3/15 neg for vascular abnormalities.    3/25 taken to OR for L craniotomy and evacuation by Dr Hudson  3/28 witnessed Sz      Interval Problem Update  3/30: Patient seen at bedside this morning.  The patient mentions he feels somewhat better.  As per neurosurgery patient can be discharged to SNF once medically cleared.  We are pending recommendations as to when to restart anticoagulation by neurosurgery, we appreciate recommendations.  As per nursing no other overnight events reported.    3/31: Patient seen at bedside this morning.  The patient seems more lethargic.  Neurosurgery in the room at the same time as my evaluation, they ordered a CT scan which did not reveal acute abnormality.  It would like an MRI of the brain, however the patient with a pacemaker, we will inquire possible to do the MRI.  We have also consulted hematology due to ongoing thrombocytopenia.  As per nursing no other overnight events were reported.    4/1: Patient seen at bedside this morning, it appears patient is more awake today.  Unable to perform MRI due to patient's pacemaker.  Platelets this morning were 46, we will repeat lipids in the afternoon and if less than 40 we will transfuse 1 unit of platelets.  Hematology following pending ongoing work-up.  As per nursing no other overnight events were reported.    Consultants/Specialty  Neurosurgery    Code Status  Full  Code    Disposition  SNF once medically cleared.    Review of Systems  Review of Systems   Constitutional: Negative for chills and fever.   HENT: Negative for nosebleeds and sore throat.    Eyes: Negative for blurred vision and double vision.   Respiratory: Negative for cough and shortness of breath.    Cardiovascular: Negative for chest pain, palpitations and leg swelling.   Gastrointestinal: Negative for abdominal pain, diarrhea, nausea and vomiting.   Genitourinary: Negative for dysuria and urgency.   Musculoskeletal: Negative for back pain.   Skin: Negative for rash.   Neurological: Positive for weakness and headaches. Negative for dizziness and loss of consciousness.        Physical Exam  Temp:  [36.2 °C (97.2 °F)-36.8 °C (98.2 °F)] 36.2 °C (97.2 °F)  Pulse:  [77-80] 80  Resp:  [18] 18  BP: (102-123)/(76-86) 119/82  SpO2:  [96 %-97 %] 96 %    Physical Exam  Vitals reviewed.   Constitutional:       General: He is not in acute distress.     Appearance: Normal appearance. He is well-developed. He is not diaphoretic.   HENT:      Head: Normocephalic and atraumatic.      Comments: L scalp incision noted.  No signs of infection  Eyes:      Conjunctiva/sclera: Conjunctivae normal.   Neck:      Vascular: No JVD.   Cardiovascular:      Rate and Rhythm: Normal rate.      Heart sounds: No murmur. No gallop.    Pulmonary:      Effort: Pulmonary effort is normal. No respiratory distress.      Breath sounds: No stridor. No wheezing or rales.   Abdominal:      Palpations: Abdomen is soft.      Tenderness: There is no abdominal tenderness. There is no guarding or rebound.   Musculoskeletal:         General: No tenderness.      Right lower leg: No edema.      Left lower leg: No edema.   Skin:     General: Skin is warm and dry.      Findings: No rash.   Neurological:      General: No focal deficit present.      Mental Status: He is alert and oriented to person, place, and time.   Psychiatric:         Thought Content: Thought  content normal.         Fluids    Intake/Output Summary (Last 24 hours) at 4/1/2021 1724  Last data filed at 4/1/2021 1600  Gross per 24 hour   Intake --   Output 850 ml   Net -850 ml       Laboratory  Recent Labs     03/31/21  0305 03/31/21  1343 04/01/21  0446   WBC 6.9 6.4 5.0   RBC 3.43* 3.50* 3.46*   HEMOGLOBIN 11.2* 11.5* 11.4*   HEMATOCRIT 33.3* 34.7* 34.5*   MCV 97.1 99.1* 99.7*   MCH 32.7 32.9 32.9   MCHC 33.6* 33.1* 33.0*   RDW 58.4* 59.4* 60.0*   PLATELETCT 60* 71* 46*   MPV 10.9 12.1 10.5     Recent Labs     03/30/21  1120 03/31/21  0305 04/01/21  0446   SODIUM 127* 133* 134*   POTASSIUM 4.3 4.4 4.5   CHLORIDE 97 102 102   CO2 21 25 25   GLUCOSE 137* 107* 164*   BUN 27* 25* 27*   CREATININE 0.72 0.80 0.87   CALCIUM 8.3* 8.2* 8.1*                   Imaging  CT-HEAD W/O   Final Result      No significant interval change from one day prior.      CT-HEAD W/O   Final Result      1.  There is overall stable residual left frontal subdural and subarachnoid hemorrhage.   2.  There is left sided hemispheric edema with 3 mm of left-to-right midline shift, previously 4 mm.   3.  There is no new hemorrhage.   4.  There has been slight interval resorption of pneumocephalus with otherwise stable left craniotomy changes.   5.  Focal hypodensity in the inferior left frontal lobe again seen most consistent with an area of evolving ischemia.   6.  Underlying atrophy and white matter disease again noted.      CT-HEAD W/O   Final Result      1.  Stable sequela of left craniotomy, with stable left-sided subarachnoid and subdural hemorrhage.   2.  Residual 4 mm left-to-right midline shift.   3.  No CT evidence of new hemorrhage or large vascular territory infarct.      CT-HEAD W/O   Final Result      Interval left craniotomy with overall decrease in volume of subdural hematoma with some postsurgical extra-axial hemorrhage deep to the craniotomy flap with associated subarachnoid hemorrhage.      Persistent mass effect and  midline shift from left-to-right of approximately 5 mm.      CT-HEAD W/O   Final Result      Left extra-axial hematoma is again noted. Subacute blood closer to the vertex along the left frontoparietal lobes is mildly increased compared to prior measuring 1.2 compared to 1 cm.      Mass effect is again noted with 4.6 mm midline shift to the right, slightly increased compared to prior.      CT-ABDOMEN-PELVIS WITH   Final Result      1.  Ascites is no longer identified.      2.  Liver surface is mildly nodular consistent with cirrhosis.      3.  Splenomegaly is again noted. Punctate focus of decreased attenuation in the spleen could be due to normal differential enhancement although small hemangioma is also possible.      4.  Ventral abdominal wall hernia containing abdominal fat is again identified.      5.  Consolidation and volume loss is noted posteriorly in the right lower lobe which could indicate pneumonia.      6.  Diverticulosis.         DX-CHEST-PORTABLE (1 VIEW)   Final Result      Small right pleural effusion.      Right basilar opacities may represent atelectasis or scarring.         CT-HEAD W/O   Final Result      1.  Stable size of the extra-axial hemorrhage overlying the left cerebral hemisphere with sulci effacement and approximately 3 mm of left-to-right midline shift.         IR-CAROTID-CEREBRAL BILATERAL   Final Result         1.  Normal cerebral arteriogram.      2.  No evidence of arteriovenous malformation or intracranial aneurysm formation.      3.  No evidence of atherosclerotic disease in the right common femoral artery.      CT-CTA HEAD WITH & W/O-POST PROCESS   Final Result      No evidence of occlusive lesion or aneurysm.      Multiple tortuous small vessels along the anterior-inferior aspect of the left temporal lobe could represent some type of vascular malformation.      No significant interval change in left-sided holohemispheric subdural hematoma extending along the falx and layering  over the tentorium.      CT-CTA NECK WITH & W/O-POST PROCESSING   Final Result      CT angiogram of the neck within normal limits.      CT-HEAD W/O   Final Result         1. Grossly unchanged left hemispheric subdural hematoma, most in the left frontal and temporal region.   2. No new intracranial hemorrhage.                  CT-HEAD W/O   Final Result      1.  Stable large LEFT hemispheric subdural hematoma with associated mass effect and midline shift.   2.  No significant change from prior exam obtained 2 hours earlier.      CT-HEAD W/O   Final Result      Large LEFT holohemispheric subdural hematoma with associated mass effect and 4 mm LEFT to RIGHT midline shift.      These findings were discussed with EFREM FITZGERALD on 3/12/2021 11:40 AM.      DX-CHEST-PORTABLE (1 VIEW)   Final Result      1.  Hypoinflation, improved from prior.   2.  Minimal RIGHT lung base atelectasis or scar.   3.  Minimal RIGHT pleural effusion.           Assessment/Plan  * Subdural hematoma, acute (HCC)  Assessment & Plan  Continues to have headache  Neurosurgery following and recommending continuing steroids and Keppra for seizure prophylaxis. On steroid taper  Repeat CT scan 3/22: 1.2cm extra axial hematoma (from 1cm) with 4.6mm midline shift to right slightly greater compared to prior imaging  Neurosurgery following  PT/OT/SLP following, continue therapy    S/p craniotomy 3/25  Cont Neuro checks  BP control  Sz prophylaxis  PT/OT/SLP    3/30: As per neurosurgery, patient able to be discharged to skilled nursing facility when ready.  Staples and sutures out 2 weeks postop, follow-up with [facility] neurosurgery group in 4 weeks with a new head CT as per neurosurgery note, their office will arrange it.  Neurosurgery has also increased salt tablets to 2 g 3 times daily with Florinef.    3/31: Patient seems more lethargic this morning.  Neurosurgery repeated CT scan which did not reveal acute abnormality.  Neurosurgery would like to have an  MRI of the brain, patient with pacemaker we will inquire if it is compatible with MRI machine.  Neurosurgery also recommends starting the patient on Decadron 4 mg every 6 hours for now.    4/1: Patient seems to be more awake today.  Unable to do MRI due to pacemaker.  We have switched Keppra to Vimpat due to Keppra possibly causing thrombocytopenia.  I talked to Dr. Aranda from neurology and he recommended to start Vimpat at 100 mg twice daily.      Thrombocytopenia (HCC)  Assessment & Plan  Likely due to cirrhosis  Continue to monitor    3/31: While thrombocytopenia could have a component of cirrhosis.  The patient's platelet count on 18 March was 221.  We have consulted hematology for further recommendations.      4/1: Hematology evaluating the patient.  We have sent HIT antibodies and [facility], we will require [facility] confirmation before starting argatroban.  Hematology would like to switch Keppra to a different antiseizure medication, and we have talked with neurology who recommended Vimpat.  Platelets this morning were 46, as per hematology we will repeat CBC this afternoon and if less than 40 we will transfuse 1 unit of platelets.    History of prosthetic mitral valve  Assessment & Plan  With paroxysmal atrial fibrillation  Anticoagulation reversed given subdural hematoma  Resume anticoagulation when bleeding risk felt to be acceptable by neurosurgery    COPD (chronic obstructive pulmonary disease) (HCC)- (present on admission)  Assessment & Plan  Patient not in acute exacerbation or requiring oxygen  O2/RT protocols    Gout- (present on admission)  Assessment & Plan  Continue allopurinol    3/31: Due to ongoing thrombocytopenia and as per hematology's recommendation we will hold allopurinol for now.    Type 2 diabetes mellitus without complication, without long-term current use of insulin (HCC)- (present on admission)  Assessment & Plan  Patient with hyperglycemia worsened by steroids  A1C 5.9  lantus 15  units  SSI  Well controled on current regimen however will likely need to cut back lantus as decadron is tapered off    3/30: We will stop lantus, and continue with ISS. We will monitor and make changes accordingly. Hypoglycemia protocol in place.    3/31: As per neurosurgery we have started the patient on Decadron 4 mg every 6 hours, we will monitor glucose closely if needed we will restart Lantus.        Atrial fibrillation (HCC)  Assessment & Plan  Holding anticoagulation in setting of acute SDH  Rate control with metoprolol  Cont Tele monitoring    3/31: As per neurosurgery patient to resume anticoagulation in 2 weeks, unless he requires new neurosurgical intervention.    Seizure (HCC)  Assessment & Plan  Secondary to SDH  Witnessed event: pt started with aphasia then gaze went upward and fixed with head tremor.  Pt was then unresponsive.  Episode lasting approx 1 min with rapid return to baseline   2 Szs on 3/28 none since  Cont Keppra 1g BID    4/1: Due to ongoing thrombocytopenia as per hematology Keppra can sometimes cause low platelets, so we have switched to Vimpat as per neurology's recommendation.    Cognitive deficits  Assessment & Plan  Evaluated by SLP and found to have cognitive deficits, recommending SNF  Cognitive deficits likely secondary to SDH  Discussed with friend, he is not at previous baseline    3/23 Will likely need SNF placement, order placed  CM to assist  Accepted by HH  Noncompliance with pt/ot  Encourage activity    3/30: Pending placement.    Alcoholic cirrhosis of liver without ascites (HCC)  Assessment & Plan  Patient reports history of heavy alcohol use, has not drank in few years  Will need GI follow up as outpatient  Denies previous diagnosis of liver disease  Cirrhotic appearing liver on imaging without ascites  Hep panel neg  Ammonia 26    Generalized abdominal pain  Assessment & Plan  No definitive etiology of pain, does have fat containing ventral hernia and diverticulosis  without diverticulitis  No evidence of ascites  Continue to monitor  Pain has since resolved    Venous stasis dermatitis of right lower extremity  Assessment & Plan  Does not appear infected on examination  Continue skin care per nursing protocol    Grade IV hemorrhoids  Assessment & Plan  Patient reports chronic hemorrhoids with occasional mild rectal bleeding  lidocaine gel for pain relief        Hyponatremia  Assessment & Plan  Asymptomatic hyponatremia  Hyponatremia worsening 3/22 now down to 126  3/23 stable at 129, cont current management  Likely SIADH  Free water restriction  Due to subdural hematoma will start salt supplementation    3/30: Continue salt tablets and florinef as per neurosurgery recommendations.    Valvular cardiomyopathy (HCC)- (present on admission)  Assessment & Plan  History of bioprosthetic mitral valve replacement    Pacemaker  Assessment & Plan  History of pacemaker limiting ability to obtain MRI    Obesity (BMI 30-39.9)- (present on admission)  Assessment & Plan  Will need weight loss program on discharge  Patient counseled on lifestyle modifications  Will need close follow up as outpatient.    Benign prostatic hyperplasia without lower urinary tract symptoms- (present on admission)  Assessment & Plan  Continue Flomax  No evidence of urinary obstruction       VTE prophylaxis: SCDs    I certify that the patient requires continued medically necessary hospital services for the treatment of SDH and will remain in the hospital until neurosurgery feels patient is safe for discharge,.  Discharge plan is anticipated to be to SNF.

## 2021-04-02 NOTE — DISCHARGE PLANNING
Anticipated Discharge Disposition: SNF    Action: Lsw spoke with [employer] liaison, Linda who stated that if pt is medically cleared tomorrow, [facility] SNF will have bed and they will be able to take in pt 4/3/21    Barriers to Discharge: dispo needs currently unknown    Plan: Follow up with pt tomorrow

## 2021-04-03 NOTE — PROGRESS NOTES
Received call from lab w/ critical lab value of plt at 43. Critical lab value read back to  [facility]. Lab value within parameters in standing orders.

## 2021-04-03 NOTE — CARE PLAN
Problem: Knowledge Deficit:  Goal: Knowledge of disease process/condition, treatment plan, diagnostic tests, and medications will improve  Outcome: PROGRESSING AS EXPECTED  Note: Explained POC for tonight,verbalize understanding.     Problem: Safety  Goal: Will remain free from falls  Outcome: PROGRESSING AS EXPECTED  Note: Fall precautions in place.     Problem: Skin Integrity  Goal: Risk for impaired skin integrity will decrease  Outcome: PROGRESSING AS EXPECTED  Note: Able to turn & reposition independently

## 2021-04-03 NOTE — CARE PLAN
Problem: Knowledge Deficit  Goal: Knowledge of disease process/condition, treatment plan, diagnostic tests, and medications will improve  Outcome: PROGRESSING AS EXPECTED     Problem: Skin Integrity  Goal: Risk for impaired skin integrity will decrease  Outcome: PROGRESSING AS EXPECTED     Problem: Urinary Elimination:  Goal: Ability to reestablish a normal urinary elimination pattern will improve  Outcome: PROGRESSING AS EXPECTED     Problem: Urinary:  Goal: Ability to reestablish a normal urinary elimination pattern will improve  Outcome: PROGRESSING AS EXPECTED     Problem: Knowledge Deficit:  Goal: Knowledge of disease process/condition, treatment plan, diagnostic tests, and medications will improve  Outcome: PROGRESSING AS EXPECTED

## 2021-04-03 NOTE — PROGRESS NOTES
[facility] Medicine Daily Progress Note    Date of Service  4/3/2021    Chief Complaint  65 y.o. male admitted 3/12/2021 with altered mentation.    Hospital Course   64yo PMHx ETOH, AFIb, anticoagulation with warfarin, CAD, bioprosthetic MVR, gout, HTN, PPM, DVT/PE, DM.    Friend could not get a hold of pt for 3 days.  EMS called and found pt with R side deficits.  In ED CT demonstrating L wholohemispheric SDH.  INR reversed with KCentra.  Initially treated non operatively however surveilance CT morena 3/22 showed the SDH was enlarging so taken to the OR.  Angiogram on 3/15 neg for vascular abnormalities.    3/25 taken to OR for L craniotomy and evacuation by Dr Hudson  3/28 witnessed Sz      Interval Problem Update  3/30: Patient seen at bedside this morning.  The patient mentions he feels somewhat better.  As per neurosurgery patient can be discharged to SNF once medically cleared.  We are pending recommendations as to when to restart anticoagulation by neurosurgery, we appreciate recommendations.  As per nursing no other overnight events reported.    3/31: Patient seen at bedside this morning.  The patient seems more lethargic.  Neurosurgery in the room at the same time as my evaluation, they ordered a CT scan which did not reveal acute abnormality.  It would like an MRI of the brain, however the patient with a pacemaker, we will inquire possible to do the MRI.  We have also consulted hematology due to ongoing thrombocytopenia.  As per nursing no other overnight events were reported.    4/1: Patient seen at bedside this morning, it appears patient is more awake today.  Unable to perform MRI due to patient's pacemaker.  Platelets this morning were 46, we will repeat lipids in the afternoon and if less than 40 we will transfuse 1 unit of platelets.  Hematology following pending ongoing work-up.  As per nursing no other overnight events were reported.    4/2: Patient seen at bedside this morning.  Platelets this morning  were 43, as per hematology's recommendation we will transfuse platelets if they become below 40.  We will repeat CBC later today.  We are pending thrombocytopenia work-up including [facility].  We appreciate further recommendations by hematology.  We have started Decadron tapering as per neurosurgery's recommendation.  No other overnight events reported by nursing.    4/3: Patient seen at bedside this morning.  Platelets were again 43 this morning.  We will repeat CBC later today and transfuse if less than 40.  We are pending [facility].  Patient currently laying in bed comfortably not complaining of any pain.  No other overnight events reported by nursing.    Consultants/Specialty  Neurosurgery    Code Status  Full Code    Disposition  SNF once medically cleared.    Review of Systems  Review of Systems   Constitutional: Negative for chills and fever.   HENT: Negative for nosebleeds and sore throat.    Eyes: Negative for blurred vision and double vision.   Respiratory: Negative for cough and shortness of breath.    Cardiovascular: Negative for chest pain, palpitations and leg swelling.   Gastrointestinal: Negative for abdominal pain, diarrhea, nausea and vomiting.   Genitourinary: Negative for dysuria and urgency.   Musculoskeletal: Negative for back pain.   Skin: Negative for rash.   Neurological: Positive for weakness and headaches. Negative for dizziness and loss of consciousness.        Physical Exam  Temp:  [36.1 °C (97 °F)-36.8 °C (98.2 °F)] 36.2 °C (97.2 °F)  Pulse:  [80-81] 81  Resp:  [17-18] 17  BP: (111-131)/(74-99) 111/74  SpO2:  [94 %-98 %] 97 %    Physical Exam  Vitals reviewed.   Constitutional:       General: He is not in acute distress.     Appearance: Normal appearance. He is well-developed. He is not diaphoretic.   HENT:      Head: Normocephalic and atraumatic.      Comments: L scalp incision noted.  No signs of infection  Eyes:      Conjunctiva/sclera: Conjunctivae normal.   Neck:      Vascular: No JVD.    Cardiovascular:      Rate and Rhythm: Normal rate.      Heart sounds: No murmur. No gallop.    Pulmonary:      Effort: Pulmonary effort is normal. No respiratory distress.      Breath sounds: No stridor. No wheezing or rales.   Abdominal:      Palpations: Abdomen is soft.      Tenderness: There is no abdominal tenderness. There is no guarding or rebound.      Comments: Abdominal bruise, appears old, and patient states it has been there a while. Denies pain.   Musculoskeletal:         General: No tenderness.      Right lower leg: No edema.      Left lower leg: No edema.   Skin:     General: Skin is warm and dry.      Findings: No rash.   Neurological:      General: No focal deficit present.      Mental Status: He is alert and oriented to person, place, and time.   Psychiatric:         Thought Content: Thought content normal.         Fluids    Intake/Output Summary (Last 24 hours) at 4/3/2021 1452  Last data filed at 4/3/2021 1300  Gross per 24 hour   Intake 240 ml   Output 1100 ml   Net -860 ml       Laboratory  Recent Labs     04/02/21  0319 04/02/21  1700 04/03/21  0634   WBC 4.7* 6.8 4.7*   RBC 3.37* 3.59* 3.35*   HEMOGLOBIN 11.0* 11.8* 11.0*   HEMATOCRIT 33.4* 36.0* 33.4*   MCV 99.1* 100.3* 99.7*   MCH 32.6 32.9 32.8   MCHC 32.9* 32.8* 32.9*   RDW 59.3* 61.0* 59.5*   PLATELETCT 43* 59* 43*   MPV 12.4 11.3 11.7     Recent Labs     04/01/21  0446 04/02/21  0319 04/03/21  0634   SODIUM 134* 136 131*   POTASSIUM 4.5 4.8 4.4   CHLORIDE 102 106 102   CO2 25 25 25   GLUCOSE 164* 154* 128*   BUN 27* 25* 27*   CREATININE 0.87 0.84 0.85   CALCIUM 8.1* 8.3* 8.3*                   Imaging  CT-HEAD W/O   Final Result      No significant interval change from one day prior.      CT-HEAD W/O   Final Result      1.  There is overall stable residual left frontal subdural and subarachnoid hemorrhage.   2.  There is left sided hemispheric edema with 3 mm of left-to-right midline shift, previously 4 mm.   3.  There is no new  hemorrhage.   4.  There has been slight interval resorption of pneumocephalus with otherwise stable left craniotomy changes.   5.  Focal hypodensity in the inferior left frontal lobe again seen most consistent with an area of evolving ischemia.   6.  Underlying atrophy and white matter disease again noted.      CT-HEAD W/O   Final Result      1.  Stable sequela of left craniotomy, with stable left-sided subarachnoid and subdural hemorrhage.   2.  Residual 4 mm left-to-right midline shift.   3.  No CT evidence of new hemorrhage or large vascular territory infarct.      CT-HEAD W/O   Final Result      Interval left craniotomy with overall decrease in volume of subdural hematoma with some postsurgical extra-axial hemorrhage deep to the craniotomy flap with associated subarachnoid hemorrhage.      Persistent mass effect and midline shift from left-to-right of approximately 5 mm.      CT-HEAD W/O   Final Result      Left extra-axial hematoma is again noted. Subacute blood closer to the vertex along the left frontoparietal lobes is mildly increased compared to prior measuring 1.2 compared to 1 cm.      Mass effect is again noted with 4.6 mm midline shift to the right, slightly increased compared to prior.      CT-ABDOMEN-PELVIS WITH   Final Result      1.  Ascites is no longer identified.      2.  Liver surface is mildly nodular consistent with cirrhosis.      3.  Splenomegaly is again noted. Punctate focus of decreased attenuation in the spleen could be due to normal differential enhancement although small hemangioma is also possible.      4.  Ventral abdominal wall hernia containing abdominal fat is again identified.      5.  Consolidation and volume loss is noted posteriorly in the right lower lobe which could indicate pneumonia.      6.  Diverticulosis.         DX-CHEST-PORTABLE (1 VIEW)   Final Result      Small right pleural effusion.      Right basilar opacities may represent atelectasis or scarring.          CT-HEAD W/O   Final Result      1.  Stable size of the extra-axial hemorrhage overlying the left cerebral hemisphere with sulci effacement and approximately 3 mm of left-to-right midline shift.         IR-CAROTID-CEREBRAL BILATERAL   Final Result         1.  Normal cerebral arteriogram.      2.  No evidence of arteriovenous malformation or intracranial aneurysm formation.      3.  No evidence of atherosclerotic disease in the right common femoral artery.      CT-CTA HEAD WITH & W/O-POST PROCESS   Final Result      No evidence of occlusive lesion or aneurysm.      Multiple tortuous small vessels along the anterior-inferior aspect of the left temporal lobe could represent some type of vascular malformation.      No significant interval change in left-sided holohemispheric subdural hematoma extending along the falx and layering over the tentorium.      CT-CTA NECK WITH & W/O-POST PROCESSING   Final Result      CT angiogram of the neck within normal limits.      CT-HEAD W/O   Final Result         1. Grossly unchanged left hemispheric subdural hematoma, most in the left frontal and temporal region.   2. No new intracranial hemorrhage.                  CT-HEAD W/O   Final Result      1.  Stable large LEFT hemispheric subdural hematoma with associated mass effect and midline shift.   2.  No significant change from prior exam obtained 2 hours earlier.      CT-HEAD W/O   Final Result      Large LEFT holohemispheric subdural hematoma with associated mass effect and 4 mm LEFT to RIGHT midline shift.      These findings were discussed with EFREM FITZGERALD on 3/12/2021 11:40 AM.      DX-CHEST-PORTABLE (1 VIEW)   Final Result      1.  Hypoinflation, improved from prior.   2.  Minimal RIGHT lung base atelectasis or scar.   3.  Minimal RIGHT pleural effusion.           Assessment/Plan  * Thrombocytopenia (HCC)  Assessment & Plan  Likely due to cirrhosis  Continue to monitor    3/31: While thrombocytopenia could have a component  of cirrhosis.  The patient's platelet count on 18 March was 221.  We have consulted hematology for further recommendations.      4/1: Hematology evaluating the patient.  We have sent HIT antibodies and [facility], we will require [facility] confirmation before starting argatroban.  Hematology would like to switch Keppra to a different antiseizure medication, and we have talked with neurology who recommended Vimpat.  Platelets this morning were 46, as per hematology we will repeat CBC this afternoon and if less than 40 we will transfuse 1 unit of platelets.    4/3: Again platelets this morning were 43.  We will repeat CBC later today again.  If platelets less than 40 we will transfuse 1 unit of platelets as per hematology's recommendation.  We are pending [facility] results.  We appreciate further recommendations by hematology.      Subdural hematoma, acute (HCC)  Assessment & Plan  Continues to have headache  Neurosurgery following and recommending continuing steroids and Keppra for seizure prophylaxis. On steroid taper  Repeat CT scan 3/22: 1.2cm extra axial hematoma (from 1cm) with 4.6mm midline shift to right slightly greater compared to prior imaging  Neurosurgery following  PT/OT/SLP following, continue therapy    S/p craniotomy 3/25  Cont Neuro checks  BP control  Sz prophylaxis  PT/OT/SLP    3/30: As per neurosurgery, patient able to be discharged to skilled nursing facility when ready.  Staples and sutures out 2 weeks postop, follow-up with [facility] neurosurgery group in 4 weeks with a new head CT as per neurosurgery note, their office will arrange it.  Neurosurgery has also increased salt tablets to 2 g 3 times daily with Florinef.    3/31: Patient seems more lethargic this morning.  Neurosurgery repeated CT scan which did not reveal acute abnormality.  Neurosurgery would like to have an MRI of the brain, patient with pacemaker we will inquire if it is compatible with MRI machine.  Neurosurgery also recommends starting the  patient on Decadron 4 mg every 6 hours for now.    4/1: Patient seems to be more awake today.  Unable to do MRI due to pacemaker.  We have switched Keppra to Vimpat due to Keppra possibly causing thrombocytopenia.  I talked to Dr. Aranda from neurology and he recommended to start Vimpat at 100 mg twice daily.    4/2: We have started the tapering of Decadron as per neurosurgery's recommendation.      History of prosthetic mitral valve  Assessment & Plan  With paroxysmal atrial fibrillation  Anticoagulation reversed given subdural hematoma  Resume anticoagulation when bleeding risk felt to be acceptable by neurosurgery    COPD (chronic obstructive pulmonary disease) ([facility])- (present on admission)  Assessment & Plan  Patient not in acute exacerbation or requiring oxygen  O2/RT protocols    Gout- (present on admission)  Assessment & Plan  Continue allopurinol    Due to ongoing thrombocytopenia and as per hematology's recommendation we will hold allopurinol for now.  Patient currently receiving steroids.    Type 2 diabetes mellitus without complication, without long-term current use of insulin ([facility])- (present on admission)  Assessment & Plan  Patient with hyperglycemia worsened by steroids  A1C 5.9  lantus 15 units  SSI  Well controled on current regimen however will likely need to cut back lantus as decadron is tapered off    3/30: We will stop lantus, and continue with ISS. We will monitor and make changes accordingly. Hypoglycemia protocol in place.    3/31: As per neurosurgery we have started the patient on Decadron 4 mg every 6 hours, we will monitor glucose closely if needed we will restart Lantus.        Atrial fibrillation ([facility])  Assessment & Plan  Holding anticoagulation in setting of acute SDH  Rate control with metoprolol  Cont Tele monitoring    3/31: As per neurosurgery patient to resume anticoagulation in 2 weeks, unless he requires new neurosurgical intervention.    Seizure ([facility])  Assessment &  Plan  Secondary to SDH  Witnessed event: pt started with aphasia then gaze went upward and fixed with head tremor.  Pt was then unresponsive.  Episode lasting approx 1 min with rapid return to baseline   2 Szs on 3/28 none since  Cont Keppra 1g BID    4/1: Due to ongoing thrombocytopenia as per hematology Keppra can sometimes cause low platelets, so we have switched to Vimpat as per neurology's recommendation.    Cognitive deficits  Assessment & Plan  Evaluated by SLP and found to have cognitive deficits, recommending SNF  Cognitive deficits likely secondary to SDH  Discussed with friend, he is not at previous baseline    3/23 Will likely need SNF placement, order placed  CM to assist  Accepted by HH  Noncompliance with pt/ot  Encourage activity    4/3: Pending placement.    Alcoholic cirrhosis of liver without ascites (HCC)  Assessment & Plan  Patient reports history of heavy alcohol use, has not drank in few years  Will need GI follow up as outpatient  Denies previous diagnosis of liver disease  Cirrhotic appearing liver on imaging without ascites  Hep panel neg  Ammonia 26    Generalized abdominal pain  Assessment & Plan  No definitive etiology of pain, does have fat containing ventral hernia and diverticulosis without diverticulitis  No evidence of ascites  Continue to monitor  Pain has since resolved    Venous stasis dermatitis of right lower extremity  Assessment & Plan  Does not appear infected on examination  Continue skin care per nursing protocol    Grade IV hemorrhoids  Assessment & Plan  Patient reports chronic hemorrhoids with occasional mild rectal bleeding  lidocaine gel for pain relief        Hyponatremia  Assessment & Plan  Asymptomatic hyponatremia  Hyponatremia worsening 3/22 now down to 126  3/23 stable at 129, cont current management  Likely SIADH  Free water restriction  Due to subdural hematoma will start salt supplementation    4/3: Continue salt tablets and florinef as per neurosurgery  recommendations.    Valvular cardiomyopathy (HCC)- (present on admission)  Assessment & Plan  History of bioprosthetic mitral valve replacement    Pacemaker  Assessment & Plan  History of pacemaker limiting ability to obtain MRI    Obesity (BMI 30-39.9)- (present on admission)  Assessment & Plan  Will need weight loss program on discharge  Patient counseled on lifestyle modifications  Will need close follow up as outpatient.    Benign prostatic hyperplasia without lower urinary tract symptoms- (present on admission)  Assessment & Plan  Continue Flomax  No evidence of urinary obstruction       VTE prophylaxis: SCDs    I certify that the patient requires continued medically necessary hospital services for the treatment of SDH and will remain in the hospital until neurosurgery feels patient is safe for discharge,.  Discharge plan is anticipated to be to SNF.

## 2021-04-04 NOTE — PROGRESS NOTES
[facility] Medicine Daily Progress Note    Date of Service  4/4/2021    Chief Complaint  65 y.o. male admitted 3/12/2021 with altered mentation.    Hospital Course   64yo PMHx ETOH, AFIb, anticoagulation with warfarin, CAD, bioprosthetic MVR, gout, HTN, PPM, DVT/PE, DM.    Friend could not get a hold of pt for 3 days.  EMS called and found pt with R side deficits.  In ED CT demonstrating L wholohemispheric SDH.  INR reversed with KCentra.  Initially treated non operatively however surveilance CT morena 3/22 showed the SDH was enlarging so taken to the OR.  Angiogram on 3/15 neg for vascular abnormalities.    3/25 taken to OR for L craniotomy and evacuation by Dr Hudson  3/28 witnessed Sz      Interval Problem Update  3/30: Patient seen at bedside this morning.  The patient mentions he feels somewhat better.  As per neurosurgery patient can be discharged to SNF once medically cleared.  We are pending recommendations as to when to restart anticoagulation by neurosurgery, we appreciate recommendations.  As per nursing no other overnight events reported.    3/31: Patient seen at bedside this morning.  The patient seems more lethargic.  Neurosurgery in the room at the same time as my evaluation, they ordered a CT scan which did not reveal acute abnormality.  It would like an MRI of the brain, however the patient with a pacemaker, we will inquire possible to do the MRI.  We have also consulted hematology due to ongoing thrombocytopenia.  As per nursing no other overnight events were reported.    4/1: Patient seen at bedside this morning, it appears patient is more awake today.  Unable to perform MRI due to patient's pacemaker.  Platelets this morning were 46, we will repeat lipids in the afternoon and if less than 40 we will transfuse 1 unit of platelets.  Hematology following pending ongoing work-up.  As per nursing no other overnight events were reported.    4/2: Patient seen at bedside this morning.  Platelets this morning  were 43, as per hematology's recommendation we will transfuse platelets if they become below 40.  We will repeat CBC later today.  We are pending thrombocytopenia work-up including [facility].  We appreciate further recommendations by hematology.  We have started Decadron tapering as per neurosurgery's recommendation.  No other overnight events reported by nursing.    4/3: Patient seen at bedside this morning.  Platelets were again 43 this morning.  We will repeat CBC later today and transfuse if less than 40.  We are pending [facility].  Patient currently laying in bed comfortably not complaining of any pain.  No other overnight events reported by nursing.    4/4: Patient seen at bedside this morning.  Platelets were 35 this morning, and patient received 1 unit of platelets.  We will repeat CBC later today and transfuse if needed.  Hematology has also started the patient on IVIG.  Patient currently not complaining of any pain.  As per nursing no other overnight events were reported.    Consultants/Specialty  Neurosurgery    Code Status  Full Code    Disposition  SNF once medically cleared.    Review of Systems  Review of Systems   Constitutional: Negative for chills and fever.   HENT: Negative for nosebleeds and sore throat.    Eyes: Negative for blurred vision and double vision.   Respiratory: Negative for cough and shortness of breath.    Cardiovascular: Negative for chest pain, palpitations and leg swelling.   Gastrointestinal: Negative for abdominal pain, diarrhea, nausea and vomiting.   Genitourinary: Negative for dysuria and urgency.   Musculoskeletal: Negative for back pain.   Skin: Negative for rash.   Neurological: Positive for weakness and headaches. Negative for dizziness and loss of consciousness.        Physical Exam  Temp:  [35.9 °C (96.7 °F)-36.6 °C (97.8 °F)] 36.1 °C (97 °F)  Pulse:  [78-83] 78  Resp:  [16-18] 16  BP: (106-141)/(74-96) 106/74  SpO2:  [96 %-98 %] 98 %    Physical Exam  Vitals reviewed.    Constitutional:       General: He is not in acute distress.     Appearance: Normal appearance. He is well-developed. He is not diaphoretic.   HENT:      Head: Normocephalic and atraumatic.      Comments: L scalp incision noted.  No signs of infection  Eyes:      Conjunctiva/sclera: Conjunctivae normal.   Neck:      Vascular: No JVD.   Cardiovascular:      Rate and Rhythm: Normal rate.      Heart sounds: No murmur. No gallop.    Pulmonary:      Effort: Pulmonary effort is normal. No respiratory distress.      Breath sounds: No stridor. No wheezing or rales.   Abdominal:      Palpations: Abdomen is soft.      Tenderness: There is no abdominal tenderness. There is no guarding or rebound.      Comments: Abdominal bruise, appears old, and patient states it has been there a while. Denies pain.   Musculoskeletal:         General: No tenderness.      Right lower leg: No edema.      Left lower leg: No edema.   Skin:     General: Skin is warm and dry.      Findings: No rash.   Neurological:      General: No focal deficit present.      Mental Status: He is alert and oriented to person, place, and time.   Psychiatric:         Thought Content: Thought content normal.         Fluids    Intake/Output Summary (Last 24 hours) at 4/4/2021 1549  Last data filed at 4/4/2021 1131  Gross per 24 hour   Intake 620 ml   Output 1150 ml   Net -530 ml       Laboratory  Recent Labs     04/03/21  0634 04/03/21  1905 04/04/21  0353   WBC 4.7* 6.7 4.3*   RBC 3.35* 3.59* 3.52*   HEMOGLOBIN 11.0* 11.9* 11.7*   HEMATOCRIT 33.4* 36.8* 35.0*   MCV 99.7* 102.5* 99.4*   MCH 32.8 33.1* 33.2*   MCHC 32.9* 32.3* 33.4*   RDW 59.5* 62.1* 59.3*   PLATELETCT 43* 55* 35*   MPV 11.7 11.8 12.4     Recent Labs     04/02/21  0319 04/03/21  0634 04/04/21  0353   SODIUM 136 131* 131*   POTASSIUM 4.8 4.4 4.6   CHLORIDE 106 102 101   CO2 25 25 25   GLUCOSE 154* 128* 157*   BUN 25* 27* 21   CREATININE 0.84 0.85 0.72   CALCIUM 8.3* 8.3* 8.2*                    Imaging  CT-HEAD W/O   Final Result      No significant interval change from one day prior.      CT-HEAD W/O   Final Result      1.  There is overall stable residual left frontal subdural and subarachnoid hemorrhage.   2.  There is left sided hemispheric edema with 3 mm of left-to-right midline shift, previously 4 mm.   3.  There is no new hemorrhage.   4.  There has been slight interval resorption of pneumocephalus with otherwise stable left craniotomy changes.   5.  Focal hypodensity in the inferior left frontal lobe again seen most consistent with an area of evolving ischemia.   6.  Underlying atrophy and white matter disease again noted.      CT-HEAD W/O   Final Result      1.  Stable sequela of left craniotomy, with stable left-sided subarachnoid and subdural hemorrhage.   2.  Residual 4 mm left-to-right midline shift.   3.  No CT evidence of new hemorrhage or large vascular territory infarct.      CT-HEAD W/O   Final Result      Interval left craniotomy with overall decrease in volume of subdural hematoma with some postsurgical extra-axial hemorrhage deep to the craniotomy flap with associated subarachnoid hemorrhage.      Persistent mass effect and midline shift from left-to-right of approximately 5 mm.      CT-HEAD W/O   Final Result      Left extra-axial hematoma is again noted. Subacute blood closer to the vertex along the left frontoparietal lobes is mildly increased compared to prior measuring 1.2 compared to 1 cm.      Mass effect is again noted with 4.6 mm midline shift to the right, slightly increased compared to prior.      CT-ABDOMEN-PELVIS WITH   Final Result      1.  Ascites is no longer identified.      2.  Liver surface is mildly nodular consistent with cirrhosis.      3.  Splenomegaly is again noted. Punctate focus of decreased attenuation in the spleen could be due to normal differential enhancement although small hemangioma is also possible.      4.  Ventral abdominal wall hernia  containing abdominal fat is again identified.      5.  Consolidation and volume loss is noted posteriorly in the right lower lobe which could indicate pneumonia.      6.  Diverticulosis.         DX-CHEST-PORTABLE (1 VIEW)   Final Result      Small right pleural effusion.      Right basilar opacities may represent atelectasis or scarring.         CT-HEAD W/O   Final Result      1.  Stable size of the extra-axial hemorrhage overlying the left cerebral hemisphere with sulci effacement and approximately 3 mm of left-to-right midline shift.         IR-CAROTID-CEREBRAL BILATERAL   Final Result         1.  Normal cerebral arteriogram.      2.  No evidence of arteriovenous malformation or intracranial aneurysm formation.      3.  No evidence of atherosclerotic disease in the right common femoral artery.      CT-CTA HEAD WITH & W/O-POST PROCESS   Final Result      No evidence of occlusive lesion or aneurysm.      Multiple tortuous small vessels along the anterior-inferior aspect of the left temporal lobe could represent some type of vascular malformation.      No significant interval change in left-sided holohemispheric subdural hematoma extending along the falx and layering over the tentorium.      CT-CTA NECK WITH & W/O-POST PROCESSING   Final Result      CT angiogram of the neck within normal limits.      CT-HEAD W/O   Final Result         1. Grossly unchanged left hemispheric subdural hematoma, most in the left frontal and temporal region.   2. No new intracranial hemorrhage.                  CT-HEAD W/O   Final Result      1.  Stable large LEFT hemispheric subdural hematoma with associated mass effect and midline shift.   2.  No significant change from prior exam obtained 2 hours earlier.      CT-HEAD W/O   Final Result      Large LEFT holohemispheric subdural hematoma with associated mass effect and 4 mm LEFT to RIGHT midline shift.      These findings were discussed with EFREM FITZGERALD on 3/12/2021 11:40 AM.       DX-CHEST-PORTABLE (1 VIEW)   Final Result      1.  Hypoinflation, improved from prior.   2.  Minimal RIGHT lung base atelectasis or scar.   3.  Minimal RIGHT pleural effusion.           Assessment/Plan  * Thrombocytopenia (HCC)  Assessment & Plan  Likely due to cirrhosis  Continue to monitor    3/31: While thrombocytopenia could have a component of cirrhosis.  The patient's platelet count on 18 March was 221.  We have consulted hematology for further recommendations.      4/1: Hematology evaluating the patient.  We have sent HIT antibodies and [facility], we will require [facility] confirmation before starting argatroban.  Hematology would like to switch Keppra to a different antiseizure medication, and we have talked with neurology who recommended Vimpat.  Platelets this morning were 46, as per hematology we will repeat CBC this afternoon and if less than 40 we will transfuse 1 unit of platelets.    4/3: Again platelets this morning were 43.  We will repeat CBC later today again.  If platelets less than 40 we will transfuse 1 unit of platelets as per hematology's recommendation.  We are pending [facility] results.  We appreciate further recommendations by hematology.    4/4: Platelets this morning were 35 and the patient received 1 unit of platelets.  Hematology also started the patient on IVIG.  We appreciate further recommendations by hematology.  We are still pending [facility] results.      Subdural hematoma, acute (HCC)  Assessment & Plan  Continues to have headache  Neurosurgery following and recommending continuing steroids and Keppra for seizure prophylaxis. On steroid taper  Repeat CT scan 3/22: 1.2cm extra axial hematoma (from 1cm) with 4.6mm midline shift to right slightly greater compared to prior imaging  Neurosurgery following  PT/OT/SLP following, continue therapy    S/p craniotomy 3/25  Cont Neuro checks  BP control  Sz prophylaxis  PT/OT/SLP    3/30: As per neurosurgery, patient able to be discharged to skilled nursing  facility when ready.  Staples and sutures out 2 weeks postop, follow-up with [facility] neurosurgery group in 4 weeks with a new head CT as per neurosurgery note, their office will arrange it.  Neurosurgery has also increased salt tablets to 2 g 3 times daily with Julissahectorf.    3/31: Patient seems more lethargic this morning.  Neurosurgery repeated CT scan which did not reveal acute abnormality.  Neurosurgery would like to have an MRI of the brain, patient with pacemaker we will inquire if it is compatible with MRI machine.  Neurosurgery also recommends starting the patient on Decadron 4 mg every 6 hours for now.    4/1: Patient seems to be more awake today.  Unable to do MRI due to pacemaker.  We have switched Keppra to Vimpat due to Keppra possibly causing thrombocytopenia.  I talked to Dr. Aranda from neurology and he recommended to start Vimpat at 100 mg twice daily.    4/2: We have started the tapering of Decadron as per neurosurgery's recommendation.      History of prosthetic mitral valve  Assessment & Plan  With paroxysmal atrial fibrillation  Anticoagulation reversed given subdural hematoma  Resume anticoagulation when bleeding risk felt to be acceptable by neurosurgery    COPD (chronic obstructive pulmonary disease) (HCC)- (present on admission)  Assessment & Plan  Patient not in acute exacerbation or requiring oxygen  O2/RT protocols    Gout- (present on admission)  Assessment & Plan  Due to ongoing thrombocytopenia and as per hematology's recommendation we will hold allopurinol for now.  Patient currently receiving steroids.    Type 2 diabetes mellitus without complication, without long-term current use of insulin (HCC)- (present on admission)  Assessment & Plan  Patient with hyperglycemia worsened by steroids  A1C 5.9  lantus 15 units  SSI  Well controled on current regimen however will likely need to cut back lantus as decadron is tapered off    3/30: We will stop lantus, and continue with ISS. We will  monitor and make changes accordingly. Hypoglycemia protocol in place.    3/31: As per neurosurgery we have started the patient on Decadron 4 mg every 6 hours, we will monitor glucose closely if needed we will restart Lantus.        Atrial fibrillation (HCC)  Assessment & Plan  Holding anticoagulation in setting of acute SDH  Rate control with metoprolol  Cont Tele monitoring    3/31: As per neurosurgery patient to resume anticoagulation in 2 weeks, unless he requires new neurosurgical intervention.    Seizure (HCC)  Assessment & Plan  Secondary to SDH  Witnessed event: pt started with aphasia then gaze went upward and fixed with head tremor.  Pt was then unresponsive.  Episode lasting approx 1 min with rapid return to baseline   2 Szs on 3/28 none since  Cont Keppra 1g BID    4/1: Due to ongoing thrombocytopenia as per hematology Keppra can sometimes cause low platelets, so we have switched to Vimpat as per neurology's recommendation.    Cognitive deficits  Assessment & Plan  Evaluated by SLP and found to have cognitive deficits, recommending SNF  Cognitive deficits likely secondary to SDH  Discussed with friend, he is not at previous baseline    3/23 Will likely need SNF placement, order placed  CM to assist  Accepted by HH  Noncompliance with pt/ot  Encourage activity    4/4: Pending placement.    Alcoholic cirrhosis of liver without ascites (HCC)  Assessment & Plan  Patient reports history of heavy alcohol use, has not drank in few years  Will need GI follow up as outpatient  Denies previous diagnosis of liver disease  Cirrhotic appearing liver on imaging without ascites  Hep panel neg  Ammonia 26    Generalized abdominal pain  Assessment & Plan  No definitive etiology of pain, does have fat containing ventral hernia and diverticulosis without diverticulitis  No evidence of ascites  Continue to monitor  Pain has since resolved    Venous stasis dermatitis of right lower extremity  Assessment & Plan  Does not  appear infected on examination  Continue skin care per nursing protocol    Grade IV hemorrhoids  Assessment & Plan  Patient reports chronic hemorrhoids with occasional mild rectal bleeding  lidocaine gel for pain relief        Hyponatremia  Assessment & Plan  Asymptomatic hyponatremia  Hyponatremia worsening 3/22 now down to 126  3/23 stable at 129, cont current management  Likely SIADH  Free water restriction  Due to subdural hematoma will start salt supplementation    4/4: Continue salt tablets and florinef as per neurosurgery recommendations.    Valvular cardiomyopathy (HCC)- (present on admission)  Assessment & Plan  History of bioprosthetic mitral valve replacement    Pacemaker  Assessment & Plan  History of pacemaker limiting ability to obtain MRI    Obesity (BMI 30-39.9)- (present on admission)  Assessment & Plan  Will need weight loss program on discharge  Patient counseled on lifestyle modifications  Will need close follow up as outpatient.    Benign prostatic hyperplasia without lower urinary tract symptoms- (present on admission)  Assessment & Plan  Continue Flomax  No evidence of urinary obstruction       VTE prophylaxis: SCDs    I certify that the patient requires continued medically necessary hospital services for the treatment of SDH and will remain in the hospital until neurosurgery feels patient is safe for discharge,.  Discharge plan is anticipated to be to SNF.

## 2021-04-04 NOTE — PROGRESS NOTES
Oncology/Hematology Progress Note               Author: Vitaliy Wetzel M.D. Date & Time created: 4/4/2021  3:02 PM   Chief complaint-thrombocytopenia, subdural hematoma    Interval History:  Keppra was switched to Vimpat.  Platelets today 35.  Getting platelet transfusion.  He feels about the same.  Poor insight into his condition no bleeding or bruising      Review of Systems:  Review of Systems   Constitutional: Negative for fever.   Eyes: Negative for blurred vision.   Respiratory: Negative for cough.    Cardiovascular: Negative for chest pain.   Gastrointestinal: Negative for heartburn.   Musculoskeletal: Positive for neck pain.   Skin: Negative for rash.   Neurological: Positive for focal weakness and headaches.       Physical Exam:  Physical Exam  HENT:      Head:      Comments: Scalp scar with intact staples     Mouth/Throat:      Pharynx: No oropharyngeal exudate.   Cardiovascular:      Rate and Rhythm: Normal rate.   Pulmonary:      Effort: Pulmonary effort is normal.   Skin:     Findings: No rash.   Neurological:      Mental Status: He is alert.      Motor: Weakness present.         Labs:          Recent Labs     04/02/21 0319 04/03/21 0634 04/04/21  0353   SODIUM 136 131* 131*   POTASSIUM 4.8 4.4 4.6   CHLORIDE 106 102 101   CO2 25 25 25   BUN 25* 27* 21   CREATININE 0.84 0.85 0.72   MAGNESIUM 2.0 1.8 1.9   CALCIUM 8.3* 8.3* 8.2*     Recent Labs     04/02/21 0319 04/03/21  0634 04/04/21  0353   ALTSGPT 76* 88* 105*   ASTSGOT 22 25 26   ALKPHOSPHAT 79 79 86   TBILIRUBIN 0.3 0.4 0.4   GLUCOSE 154* 128* 157*     Recent Labs     04/03/21 0634 04/03/21 1905 04/04/21  0353   RBC 3.35* 3.59* 3.52*   HEMOGLOBIN 11.0* 11.9* 11.7*   HEMATOCRIT 33.4* 36.8* 35.0*   PLATELETCT 43* 55* 35*     Recent Labs     04/02/21 0319 04/02/21  1700 04/03/21  0634 04/03/21 1905 04/04/21  0353   WBC 4.7*   < > 4.7* 6.7 4.3*   NEUTSPOLYS 86.80*   < > 82.00* 89.40* 84.80*   LYMPHOCYTES 8.50*   < > 11.20* 5.90* 9.40*    MONOCYTES 3.40   < > 5.30 3.30 4.20   EOSINOPHILS 0.00   < > 0.00 0.00 0.00   BASOPHILS 0.00   < > 0.00 0.10 0.00   ASTSGOT 22  --  25  --  26   ALTSGPT 76*  --  88*  --  105*   ALKPHOSPHAT 79  --  79  --  86   TBILIRUBIN 0.3  --  0.4  --  0.4    < > = values in this interval not displayed.     Recent Labs     21  0319 21  0634 21  0353   SODIUM 136 131* 131*   POTASSIUM 4.8 4.4 4.6   CHLORIDE 106 102 101   CO2 25 25 25   GLUCOSE 154* 128* 157*   BUN 25* 27* 21   CREATININE 0.84 0.85 0.72   CALCIUM 8.3* 8.3* 8.2*     Hemodynamics:  Temp (24hrs), Av.2 °C (97.1 °F), Min:35.9 °C (96.7 °F), Max:36.6 °C (97.8 °F)  Temperature: 36.1 °C (97 °F)  Pulse  Av.7  Min: 34  Max: 92   Blood Pressure : 106/74     Respiratory:    Respiration: 16, Pulse Oximetry: 98 %     Work Of Breathing / Effort: Within Normal Limits  RML Breath Sounds: Diminished, RLL Breath Sounds: Diminished, LLL Breath Sounds: Diminished  Fluids:    Intake/Output Summary (Last 24 hours) at 2021 1421  Last data filed at 2021 0900  Gross per 24 hour   Intake --   Output 650 ml   Net -650 ml     Weight: 95.9 kg (211 lb 6.7 oz)  GI/Nutrition:  Orders Placed This Encounter   Procedures    Diet Order Diet: Level 5 - Minced and Moist; Liquid level: Level 0 - Thin; Second Modifier: (optional): Consistent CHO (Diabetic)     Standing Status:   Standing     Number of Occurrences:   1     Order Specific Question:   Diet:     Answer:   Level 5 - Minced and Moist [24]     Order Specific Question:   Liquid level     Answer:   Level 0 - Thin     Order Specific Question:   Second Modifier: (optional)     Answer:   Consistent CHO (Diabetic) [4]     Medical Decision Making, by Problem:  Active Hospital Problems    Diagnosis     *Subdural hematoma, acute (HCC) [S06.5X9A]     Thrombocytopenia (HCC) [D69.6]     History of prosthetic mitral valve [Z95.2]     COPD (chronic obstructive pulmonary disease) (HCC) [J44.9]     Gout [M10.9]     Type 2  diabetes mellitus without complication, without long-term current use of insulin (HCC) [E11.9]     Atrial fibrillation (HCC) [I48.91]     Pacemaker [Z95.0]     Benign prostatic hyperplasia without lower urinary tract symptoms [N40.0]     Obesity (BMI 30-39.9) [E66.9]     Seizure (HCC) [R56.9]     Cognitive deficits [R41.89]     Generalized abdominal pain [R10.84]     Alcoholic cirrhosis of liver without ascites (HCC) [K70.30]     Hyponatremia [E87.1]     Grade IV hemorrhoids [K64.3]     Venous stasis dermatitis of right lower extremity [I87.2]     Valvular cardiomyopathy (HCC) [I42.8]        Plan:  Isolated thrombocytopenia -   Unclear etiology.  HIT screening positive for PF4 antibodies however overall he has low 4T score .  [facility] pending  Cannot anticoagulate given his subdural hematoma.  WBC is and hemoglobin overall stable- low yield for any bone marrow biopsy  Platelet count continues to drift down.  Getting 1 unit in view of his SDH.  He has been on steroid taper by neurosurgery  We will do 1 dose empiric IVIG   No DIC.  Overall low suspicion for TTP given his clinical presentation.  Keppra has been switched to Vimpat.  He is off of allopurinol.    Dr Lange to follow

## 2021-04-04 NOTE — CARE PLAN
Problem: Knowledge Deficit  Goal: Knowledge of disease process/condition, treatment plan, diagnostic tests, and medications will improve  Outcome: PROGRESSING AS EXPECTED     Problem: Skin Integrity  Goal: Risk for impaired skin integrity will decrease  Outcome: PROGRESSING AS EXPECTED     Problem: Respiratory:  Goal: Respiratory status will improve  Outcome: PROGRESSING AS EXPECTED     Problem: Knowledge Deficit:  Goal: Knowledge of disease process/condition, treatment plan, diagnostic tests, and medications will improve  Outcome: PROGRESSING AS EXPECTED

## 2021-04-04 NOTE — CARE PLAN
Problem: Knowledge Deficit  Goal: Knowledge of disease process/condition, treatment plan, diagnostic tests, and medications will improve  Outcome: PROGRESSING AS EXPECTED  Note: Monitoring platelets & transfuse PRN per protocol,on weaning dose of decadron.     Problem: Skin Integrity  Goal: Risk for impaired skin integrity will decrease  Outcome: PROGRESSING AS EXPECTED  Note: Able to turn & reposition independently.     Problem: Urinary Elimination:  Goal: Ability to reestablish a normal urinary elimination pattern will improve  Outcome: PROGRESSING AS EXPECTED  Note: Assisted urinating in urinal.     Problem: Safety  Goal: Will remain free from falls  Outcome: PROGRESSING AS EXPECTED  Note: Fall precautions in place.

## 2021-04-05 PROBLEM — R10.84 GENERALIZED ABDOMINAL PAIN: Status: RESOLVED | Noted: 2021-01-01 | Resolved: 2021-01-01

## 2021-04-05 NOTE — THERAPY
"Occupational Therapy  Daily Treatment     Patient Name: Morales Olivier  Age:  65 y.o., Sex:  male  Medical Record #: [id number]  Today's Date: 4/5/2021       Precautions: Fall Risk, Swallow Precautions ( See Comments)  Comments: hx of seizures     Assessment    Pt seen for OT tx. Continues to be limited by decreased activity tolerance, balance deficits, inattention and pain impacting ability to complete ADLs and ADL transfers independently. Pleasant and cooperative throughout session. Pt amb w/ FWW in room w/ min A for balance and safety. Pt required extra time to initiate ADLs d/t being easily distracted. Pt reports feeling \"better\" today. Will continue to benefit from OT services while in house.     Plan    Continue current treatment plan.    DC Equipment Recommendations: Unable to determine at this time  Discharge Recommendations: Recommend post-acute placement for additional occupational therapy services prior to discharge home       04/05/21 1201   Cognition    Cognition / Consciousness X   Safety Awareness Impaired   New Learning Impaired   Attention Impaired   Balance   Sitting Balance (Static) Fair   Sitting Balance (Dynamic) Fair   Standing Balance (Static) Fair -   Standing Balance (Dynamic) Fair -   Weight Shift Sitting Fair   Weight Shift Standing Fair   Bed Mobility    Supine to Sit Supervised  (HOB elevated )   Sit to Supine   (up EOB, RN aware)   Scooting Supervised   Rolling Supervised   Activities of Daily Living   Grooming Supervision;Seated   Upper Body Dressing Minimal Assist   Lower Body Dressing Minimal Assist   Toileting Minimal Assist   Functional Mobility   Sit to Stand Minimal Assist   Bed, Chair, Wheelchair Transfer Minimal Assist   Toilet Transfers Minimal Assist   Short Term Goals   Short Term Goal # 1 Pt will demo LB dressing using AE PRN w/ SPV   Goal Outcome # 1 Progressing as expected   Short Term Goal # 2 Pt will demo standing grooming w/ SPV   Goal Outcome # 2 Goal not met   Short " Term Goal # 3 Pt will demo toilet txf w/ SPV   Goal Outcome # 3 Progressing as expected   Short Term Goal # 4 Pt will demo toilet hygiene w/ SPV   Goal Outcome # 4 Goal not met   Anticipated Discharge Equipment and Recommendations   DC Equipment Recommendations Unable to determine at this time   Discharge Recommendations Recommend post-acute placement for additional occupational therapy services prior to discharge home          No

## 2021-04-05 NOTE — THERAPY
"Physical Therapy   Daily Treatment     Patient Name: Morales Olivier  Age:  65 y.o., Sex:  male  Medical Record #: [id number]  Today's Date: 4/5/2021     Precautions: Fall Risk, Swallow Precautions ( See Comments)    Assessment    Pt very pleasant and agreeable to therapy. Able to perform bed mobility without physical assist. Very cautious with mobility, max encouragement to progress through phases of mobility. Once ambulating very tiny steps and difficulty keeping his LE in the confines of the FWW. Physical assist to keep appropriate FWW management and body position. Pt hyperverbose today but pleasant. Acute PT to continue to follow. Continue to recommend placement.      Plan    Continue current treatment plan.    DC Equipment Recommendations: Unable to determine at this time  Discharge Recommendations: Recommend post-acute placement for additional physical therapy services prior to discharge home      Subjective    \"I feel so much better\"     Objective       04/05/21 1111   Cognition    Level of Consciousness Alert   Comments very pleasant and motivated today   Balance   Sitting Balance (Static) Fair +   Sitting Balance (Dynamic) Fair   Standing Balance (Static) Fair   Standing Balance (Dynamic) Fair -   Weight Shift Sitting Fair   Weight Shift Standing Fair   Comments FWW   Gait Analysis   Gait Level Of Assist Minimal Assist   Assistive Device Front Wheel Walker   Distance (Feet) 22   # of Times Distance was Traveled 1   Deviation Bradykinetic;Shuffled Gait;Ataxic   Weight Bearing Status no restrictions   Bed Mobility    Supine to Sit Supervised   Sit to Supine Supervised   Functional Mobility   Sit to Stand Minimal Assist   Short Term Goals    Short Term Goal # 1 Patient will move supine<>sitting EOB with supervision within 6tx in order to get in/out of bed   Goal Outcome # 1 Goal met   Short Term Goal # 2 Patient will move sitting<>standing with supervision within 6tx in order to initiate gait and transfers "   Goal Outcome # 2 Goal not met   Short Term Goal # 3 Patient will ambulate 50ft with supervision within 6tx in order to access environment   Goal Outcome # 3 Goal not met

## 2021-04-05 NOTE — PROGRESS NOTES
Oncology/Hematology Progress Note               Author: Ventura Lange M.D.    Cc: Thrombocytopenia Date & Time created: 4/5/2021  12:58 PM     Interval History:  He is doing okay.  Per the nursing staff he gets better and better each day.  Neurologically he is for the most part intact.  He is having some mild headache.  He has some chronic aches in his feet, back.  He has some mild abdominal discomfort.  Has had no nausea or vomiting.  He has no cough.      PMHx, PSurgHx, SocHx, FAMHx:  All reviewed and no changes    Review of Systems:  Review of Systems   Constitutional: Positive for malaise/fatigue. Negative for chills and fever.   HENT: Negative for congestion, nosebleeds and sinus pain.    Eyes: Negative for pain, discharge and redness.   Respiratory: Negative for cough, hemoptysis, sputum production and shortness of breath.    Cardiovascular: Negative for chest pain, palpitations and leg swelling.   Gastrointestinal: Positive for abdominal pain. Negative for constipation, diarrhea, heartburn, nausea and vomiting.   Genitourinary: Negative for dysuria, frequency and urgency.   Musculoskeletal: Positive for back pain and joint pain.   Skin: Negative for itching and rash.   Neurological: Positive for weakness and headaches.       Physical Exam:  Physical Exam  Vitals reviewed.   Constitutional:       General: He is not in acute distress.     Appearance: Normal appearance. He is not toxic-appearing.   HENT:      Head: Normocephalic.      Comments: Prior left craniotomy surgical changes     Mouth/Throat:      Mouth: Mucous membranes are moist.      Pharynx: Oropharynx is clear. No oropharyngeal exudate or posterior oropharyngeal erythema.   Eyes:      General: No scleral icterus.        Right eye: No discharge.         Left eye: No discharge.      Extraocular Movements: Extraocular movements intact.      Conjunctiva/sclera: Conjunctivae normal.   Cardiovascular:      Rate and Rhythm: Normal rate and regular  rhythm.      Heart sounds: No murmur. No friction rub. No gallop.    Pulmonary:      Effort: Pulmonary effort is normal. No respiratory distress.      Breath sounds: Normal breath sounds. No wheezing or rales.   Abdominal:      General: Abdomen is flat. Bowel sounds are normal. There is no distension.      Palpations: Abdomen is soft.      Tenderness: There is no abdominal tenderness. There is no guarding.   Musculoskeletal:         General: No swelling or tenderness. Normal range of motion.      Cervical back: Normal range of motion and neck supple. No tenderness.      Right lower leg: No edema.      Left lower leg: No edema.   Lymphadenopathy:      Cervical: No cervical adenopathy.   Skin:     General: Skin is warm and dry.      Coloration: Skin is not jaundiced.      Findings: No erythema or rash.   Neurological:      General: No focal deficit present.      Mental Status: He is alert and oriented to person, place, and time.   Psychiatric:         Mood and Affect: Mood normal.         Thought Content: Thought content normal.         Judgment: Judgment normal.         Labs:          Recent Labs     04/03/21 0634 04/04/21 0353 04/05/21  0908   SODIUM 131* 131* 130*   POTASSIUM 4.4 4.6 4.4   CHLORIDE 102 101 99   CO2 25 25 26   BUN 27* 21 25*   CREATININE 0.85 0.72 0.85   MAGNESIUM 1.8 1.9 1.7   CALCIUM 8.3* 8.2* 8.5     Recent Labs     04/03/21  0634 04/04/21 0353 04/05/21  0908   ALTSGPT 88* 105* 123*   ASTSGOT 25 26 35   ALKPHOSPHAT 79 86 90   TBILIRUBIN 0.4 0.4 0.6   GLUCOSE 128* 157* 158*     Recent Labs     04/04/21 0353 04/04/21 1927 04/05/21  0908   RBC 3.52* 3.39* 3.63*   HEMOGLOBIN 11.7* 11.1* 11.7*   HEMATOCRIT 35.0* 34.0* 36.3*   PLATELETCT 35* 57* 42*     Recent Labs     04/03/21  0634 04/03/21  1905 04/04/21 0353 04/04/21 1927 04/05/21  0908   WBC 4.7*   < > 4.3* 5.0 3.3*   NEUTSPOLYS 82.00*   < > 84.80* 87.00* 84.50*   LYMPHOCYTES 11.20*   < > 9.40* 5.40* 8.80*   MONOCYTES 5.30   < > 4.20 5.60  5.20   EOSINOPHILS 0.00   < > 0.00 0.00 0.00   BASOPHILS 0.00   < > 0.00 0.20 0.00   ASTSGOT 25  --  26  --  35   ALTSGPT 88*  --  105*  --  123*   ALKPHOSPHAT 79  --  86  --  90   TBILIRUBIN 0.4  --  0.4  --  0.6    < > = values in this interval not displayed.     Recent Labs     21  0634 21  0353 21  0908   SODIUM 131* 131* 130*   POTASSIUM 4.4 4.6 4.4   CHLORIDE 102 101 99   CO2 25 25 26   GLUCOSE 128* 157* 158*   BUN 27* 21 25*   CREATININE 0.85 0.72 0.85   CALCIUM 8.3* 8.2* 8.5     Hemodynamics:  Temp (24hrs), Av.4 °C (97.5 °F), Min:36.1 °C (97 °F), Max:36.6 °C (97.8 °F)  Temperature: 36.4 °C (97.6 °F)  Pulse  Av.7  Min: 34  Max: 92   Blood Pressure : (!) 96/65     Respiratory:    Respiration: 16, Pulse Oximetry: 97 %     Work Of Breathing / Effort: Within Normal Limits  RML Breath Sounds: Diminished, RLL Breath Sounds: Diminished, LLL Breath Sounds: Diminished  Fluids:    Intake/Output Summary (Last 24 hours) at 2021 1258  Last data filed at 2021 0540  Gross per 24 hour   Intake 200 ml   Output 600 ml   Net -400 ml        GI/Nutrition:  Orders Placed This Encounter   Procedures   • Diet Order Diet: Level 5 - Minced and Moist; Liquid level: Level 0 - Thin; Second Modifier: (optional): Consistent CHO (Diabetic)     Standing Status:   Standing     Number of Occurrences:   1     Order Specific Question:   Diet:     Answer:   Level 5 - Minced and Moist [24]     Order Specific Question:   Liquid level     Answer:   Level 0 - Thin     Order Specific Question:   Second Modifier: (optional)     Answer:   Consistent CHO (Diabetic) [4]     Medical Decision Making, by Problem:  Active Hospital Problems    Diagnosis    • *Thrombocytopenia (HCC) [D69.6]    • Subdural hematoma, acute (HCC) [S06.5X9A]    • History of prosthetic mitral valve [Z95.2]    • COPD (chronic obstructive pulmonary disease) (HCC) [J44.9]    • Gout [M10.9]    • Type 2 diabetes mellitus without complication, without  long-term current use of insulin (HCC) [E11.9]    • Atrial fibrillation (HCC) [I48.91]    • Pacemaker [Z95.0]    • Benign prostatic hyperplasia without lower urinary tract symptoms [N40.0]    • Obesity (BMI 30-39.9) [E66.9]    • Seizure (HCC) [R56.9]    • Cognitive deficits [R41.89]    • Generalized abdominal pain [R10.84]    • Alcoholic cirrhosis of liver without ascites (HCC) [K70.30]    • Hyponatremia [E87.1]    • Grade IV hemorrhoids [K64.3]    • Venous stasis dermatitis of right lower extremity [I87.2]    • Valvular cardiomyopathy (HCC) [I42.8]        Plan:  1.  Thrombocytopenia--uncertain etiology.  He has some degree of liver cirrhosis, with a chronic platelet count of 110s to 130s.  There is no evidence of DIC with a fibrinogen of 212.  His testing for hepatitis B, hepatitis C, and HIV were negative.  Historically not consistent with TTP/HUS.  There was worry for HIT, with a positive platelet antibody, but he did have a low 4T score and this seems less likely.  He had [facility] testing which is negative which rules out heparin-induced thrombocytopenia.         Not likely to be ITP.  He was on high-dose steroids with no improvement.  Would be unusual to see acute evolution of ITP while in the hospital as well.  He did get 1 dose of IVIG on 4/4 with no benefit.  He does respond to platelet transfusions, although his counts dropped down quickly again.        When he was admitted on 3/12 his platelets were 152.  For the first week they state around the 110s to 150s.  During the second week of his admission they dropped down.  This suggests something acute that happened in the hospital.  My suspicion is it was related to either the Keppra or the allopurinol--both of which were stopped on 4/1.  It may take some time for his counts to recover if it is medication induced.  Sometimes this can take 2 to 4 weeks.    --We will do further testing for other causes of thrombocytopenia  --Check B12, folate  --We will check  SPEP/JORDAN and an LDH  --We will check SPENCER, ESR, RF, lupus anticoagulant  --Transfuse if platelets go below 40 (or if any new bleeding, keep platelets above 50K)      2.  SDH--management per medicine team and neurosurgery.  Has had no reaccumulation since his operative evacuation of the subdural.  --Medicine team following  --Neurosurgery following    3.  Mitral valve replacement/atrial fibrillation --he was previously on Coumadin.  All anticoagulation has been stopped, given his subdural hematoma.  --Hold anticoagulation until he is medically safe for this  --Currently high risk of bleeding with thrombocytopenia        Highly complex patient with a high risk of morbidity and mortality.  We will continue to monitor.    Quality-Core Measures   Reviewed items::  Labs reviewed, Medications reviewed and Radiology images reviewed  DVT prophylaxis pharmacological::  Contraindicated - High bleeding risk

## 2021-04-05 NOTE — CARE PLAN
Problem: Knowledge Deficit  Goal: Knowledge of disease process/condition, treatment plan, diagnostic tests, and medications will improve  Outcome: PROGRESSING AS EXPECTED     Problem: Discharge Barriers/Planning  Goal: Patient's continuum of care needs will be met  Outcome: PROGRESSING AS EXPECTED     Problem: Skin Integrity  Goal: Risk for impaired skin integrity will decrease  Outcome: PROGRESSING AS EXPECTED     Problem: Knowledge Deficit:  Goal: Knowledge of disease process/condition, treatment plan, diagnostic tests, and medications will improve  Outcome: PROGRESSING AS EXPECTED

## 2021-04-05 NOTE — DISCHARGE PLANNING
Anticipated Discharge Disposition:   SNF    Action:    [facility] has accepted patient.    PASRR:  [id number]  LOC:  [id number]    Platelets low.      Barriers to Discharge:    Medical clearance    Plan:    Provide transitional care coordination.

## 2021-04-05 NOTE — CARE PLAN
Problem: Knowledge Deficit:  Goal: Knowledge of disease process/condition, treatment plan, diagnostic tests, and medications will improve  Outcome: PROGRESSING AS EXPECTED  Note: Explained POC for tonight,the need for IVIG infusion and monitoring of his platelets.     Problem: Safety  Goal: Will remain free from falls  Outcome: PROGRESSING AS EXPECTED  Note: Fall precautions in place.     Problem: Skin Integrity  Goal: Risk for impaired skin integrity will decrease  Outcome: PROGRESSING AS EXPECTED  Note: Able to turn & reposition.

## 2021-04-06 NOTE — PROGRESS NOTES
[facility] Daily Progress Note    Date of Service  4/5/2021    Chief Complaint  65 y.o. male admitted 3/12/2021 with altered mentation.    Hospital Course   66yo PMHx ETOH, AFIb, anticoagulation with warfarin, CAD, bioprosthetic MVR, gout, HTN, PPM, DVT/PE, DM.    Friend could not get a hold of pt for 3 days.  EMS called and found pt with R side deficits.  In ED CT demonstrating L wholohemispheric SDH.  INR reversed with KCentra.  Initially treated non operatively however surveilance CT morena 3/22 showed the SDH was enlarging so taken to the OR.  Angiogram on 3/15 neg for vascular abnormalities.    3/25 taken to OR for L craniotomy and evacuation by Dr Hudson  3/28 witnessed Sz      Interval Problem Update  3/30: Patient seen at bedside this morning.  The patient mentions he feels somewhat better.  As per neurosurgery patient can be discharged to SNF once medically cleared.  We are pending recommendations as to when to restart anticoagulation by neurosurgery, we appreciate recommendations.  As per nursing no other overnight events reported.    3/31: Patient seen at bedside this morning.  The patient seems more lethargic.  Neurosurgery in the room at the same time as my evaluation, they ordered a CT scan which did not reveal acute abnormality.  It would like an MRI of the brain, however the patient with a pacemaker, we will inquire possible to do the MRI.  We have also consulted hematology due to ongoing thrombocytopenia.  As per nursing no other overnight events were reported.    4/1: Patient seen at bedside this morning, it appears patient is more awake today.  Unable to perform MRI due to patient's pacemaker.  Platelets this morning were 46, we will repeat lipids in the afternoon and if less than 40 we will transfuse 1 unit of platelets.  Hematology following pending ongoing work-up.  As per nursing no other overnight events were reported.    4/2: Patient seen at bedside this morning.  Platelets this morning  were 43, as per hematology's recommendation we will transfuse platelets if they become below 40.  We will repeat CBC later today.  We are pending thrombocytopenia work-up including MERCY.  We appreciate further recommendations by hematology.  We have started Decadron tapering as per neurosurgery's recommendation.  No other overnight events reported by nursing.    4/3: Patient seen at bedside this morning.  Platelets were again 43 this morning.  We will repeat CBC later today and transfuse if less than 40.  We are pending MERCY.  Patient currently laying in bed comfortably not complaining of any pain.  No other overnight events reported by nursing.    4/4: Patient seen at bedside this morning.  Platelets were 35 this morning, and patient received 1 unit of platelets.  We will repeat CBC later today and transfuse if needed.  Hematology has also started the patient on IVIG.  Patient currently not complaining of any pain.  As per nursing no other overnight events were reported.    4/5: Patient seen at bedside this morning.  Platelets this morning when 42.  MERCY came back negative, which basically rules out HIT.  Hematology ordered further work-up for thrombocytopenia.  Patient laying in bed comfortably currently not complaining of any pain.  As per nursing no other overnight events were reported.    Consultants/Specialty  Neurosurgery    Code Status  Full Code    Disposition  SNF once medically cleared.    Review of Systems  Review of Systems   Constitutional: Negative for chills and fever.   HENT: Negative for nosebleeds and sore throat.    Eyes: Negative for blurred vision and double vision.   Respiratory: Negative for cough and shortness of breath.    Cardiovascular: Negative for chest pain, palpitations and leg swelling.   Gastrointestinal: Negative for abdominal pain, diarrhea, nausea and vomiting.   Genitourinary: Negative for dysuria and urgency.   Musculoskeletal: Negative for back pain.   Skin: Negative for rash.    Neurological: Positive for weakness and headaches. Negative for dizziness and loss of consciousness.        Physical Exam  Temp:  [36.1 °C (97 °F)-36.8 °C (98.3 °F)] 36.5 °C (97.7 °F)  Pulse:  [79-82] 80  Resp:  [14-18] 16  BP: ()/(65-99) 136/96  SpO2:  [93 %-97 %] 97 %    Physical Exam  Vitals reviewed.   Constitutional:       General: He is not in acute distress.     Appearance: Normal appearance. He is well-developed. He is not diaphoretic.   HENT:      Head: Normocephalic and atraumatic.      Comments: L scalp incision noted.  No signs of infection  Eyes:      Conjunctiva/sclera: Conjunctivae normal.   Neck:      Vascular: No JVD.   Cardiovascular:      Rate and Rhythm: Normal rate.      Heart sounds: No murmur. No gallop.    Pulmonary:      Effort: Pulmonary effort is normal. No respiratory distress.      Breath sounds: No stridor. No wheezing or rales.   Abdominal:      Palpations: Abdomen is soft.      Tenderness: There is no abdominal tenderness. There is no guarding or rebound.      Comments: Abdominal bruise, appears old, and patient states it has been there a while. Denies pain.   Musculoskeletal:         General: No tenderness.      Right lower leg: No edema.      Left lower leg: No edema.   Skin:     General: Skin is warm and dry.      Findings: No rash.   Neurological:      General: No focal deficit present.      Mental Status: He is alert and oriented to person, place, and time.   Psychiatric:         Thought Content: Thought content normal.         Fluids    Intake/Output Summary (Last 24 hours) at 4/5/2021 1835  Last data filed at 4/5/2021 1622  Gross per 24 hour   Intake 660 ml   Output 800 ml   Net -140 ml       Laboratory  Recent Labs     04/04/21  0353 04/04/21  1927 04/05/21  0908   WBC 4.3* 5.0 3.3*   RBC 3.52* 3.39* 3.63*   HEMOGLOBIN 11.7* 11.1* 11.7*   HEMATOCRIT 35.0* 34.0* 36.3*   MCV 99.4* 100.3* 100.0*   MCH 33.2* 32.7 32.2   MCHC 33.4* 32.6* 32.2*   RDW 59.3* 60.4* 59.8*    PLATELETCT 35* 57* 42*   MPV 12.4 12.5 11.6     Recent Labs     04/03/21  0634 04/04/21  0353 04/05/21  0908   SODIUM 131* 131* 130*   POTASSIUM 4.4 4.6 4.4   CHLORIDE 102 101 99   CO2 25 25 26   GLUCOSE 128* 157* 158*   BUN 27* 21 25*   CREATININE 0.85 0.72 0.85   CALCIUM 8.3* 8.2* 8.5                   Imaging  CT-HEAD W/O   Final Result      No significant interval change from one day prior.      CT-HEAD W/O   Final Result      1.  There is overall stable residual left frontal subdural and subarachnoid hemorrhage.   2.  There is left sided hemispheric edema with 3 mm of left-to-right midline shift, previously 4 mm.   3.  There is no new hemorrhage.   4.  There has been slight interval resorption of pneumocephalus with otherwise stable left craniotomy changes.   5.  Focal hypodensity in the inferior left frontal lobe again seen most consistent with an area of evolving ischemia.   6.  Underlying atrophy and white matter disease again noted.      CT-HEAD W/O   Final Result      1.  Stable sequela of left craniotomy, with stable left-sided subarachnoid and subdural hemorrhage.   2.  Residual 4 mm left-to-right midline shift.   3.  No CT evidence of new hemorrhage or large vascular territory infarct.      CT-HEAD W/O   Final Result      Interval left craniotomy with overall decrease in volume of subdural hematoma with some postsurgical extra-axial hemorrhage deep to the craniotomy flap with associated subarachnoid hemorrhage.      Persistent mass effect and midline shift from left-to-right of approximately 5 mm.      CT-HEAD W/O   Final Result      Left extra-axial hematoma is again noted. Subacute blood closer to the vertex along the left frontoparietal lobes is mildly increased compared to prior measuring 1.2 compared to 1 cm.      Mass effect is again noted with 4.6 mm midline shift to the right, slightly increased compared to prior.      CT-ABDOMEN-PELVIS WITH   Final Result      1.  Ascites is no longer  identified.      2.  Liver surface is mildly nodular consistent with cirrhosis.      3.  Splenomegaly is again noted. Punctate focus of decreased attenuation in the spleen could be due to normal differential enhancement although small hemangioma is also possible.      4.  Ventral abdominal wall hernia containing abdominal fat is again identified.      5.  Consolidation and volume loss is noted posteriorly in the right lower lobe which could indicate pneumonia.      6.  Diverticulosis.         DX-CHEST-PORTABLE (1 VIEW)   Final Result      Small right pleural effusion.      Right basilar opacities may represent atelectasis or scarring.         CT-HEAD W/O   Final Result      1.  Stable size of the extra-axial hemorrhage overlying the left cerebral hemisphere with sulci effacement and approximately 3 mm of left-to-right midline shift.         IR-CAROTID-CEREBRAL BILATERAL   Final Result         1.  Normal cerebral arteriogram.      2.  No evidence of arteriovenous malformation or intracranial aneurysm formation.      3.  No evidence of atherosclerotic disease in the right common femoral artery.      CT-CTA HEAD WITH & W/O-POST PROCESS   Final Result      No evidence of occlusive lesion or aneurysm.      Multiple tortuous small vessels along the anterior-inferior aspect of the left temporal lobe could represent some type of vascular malformation.      No significant interval change in left-sided holohemispheric subdural hematoma extending along the falx and layering over the tentorium.      CT-CTA NECK WITH & W/O-POST PROCESSING   Final Result      CT angiogram of the neck within normal limits.      CT-HEAD W/O   Final Result         1. Grossly unchanged left hemispheric subdural hematoma, most in the left frontal and temporal region.   2. No new intracranial hemorrhage.                  CT-HEAD W/O   Final Result      1.  Stable large LEFT hemispheric subdural hematoma with associated mass effect and midline shift.    2.  No significant change from prior exam obtained 2 hours earlier.      CT-HEAD W/O   Final Result      Large LEFT holohemispheric subdural hematoma with associated mass effect and 4 mm LEFT to RIGHT midline shift.      These findings were discussed with EFREM FITZGERALD on 3/12/2021 11:40 AM.      DX-CHEST-PORTABLE (1 VIEW)   Final Result      1.  Hypoinflation, improved from prior.   2.  Minimal RIGHT lung base atelectasis or scar.   3.  Minimal RIGHT pleural effusion.           Assessment/Plan  * Thrombocytopenia (HCC)  Assessment & Plan  Likely due to cirrhosis  Continue to monitor    3/31: While thrombocytopenia could have a component of cirrhosis.  The patient's platelet count on 18 March was 221.  We have consulted hematology for further recommendations.      4/1: Hematology evaluating the patient.  We have sent HIT antibodies and MERCY, we will require MERCY confirmation before starting argatroban.  Hematology would like to switch Keppra to a different antiseizure medication, and we have talked with neurology who recommended Vimpat.  Platelets this morning were 46, as per hematology we will repeat CBC this afternoon and if less than 40 we will transfuse 1 unit of platelets.    4/3: Again platelets this morning were 43.  We will repeat CBC later today again.  If platelets less than 40 we will transfuse 1 unit of platelets as per hematology's recommendation.  We are pending MERCY results.  We appreciate further recommendations by hematology.    4/4: Platelets this morning were 35 and the patient received 1 unit of platelets.  Hematology also started the patient on IVIG.  We appreciate further recommendations by hematology.  We are still pending MERCY results.    4/5: Platelets were 42 this morning. MERCY is negative, which basically rules out HIT. Hematology still following and have ordered more diagnostic labs. We will not restart anticoagulation until approved by hematology and neurosurgery.      Subdural hematoma,  acute (HCC)  Assessment & Plan  Continues to have headache  Neurosurgery following and recommending continuing steroids and Keppra for seizure prophylaxis. On steroid taper  Repeat CT scan 3/22: 1.2cm extra axial hematoma (from 1cm) with 4.6mm midline shift to right slightly greater compared to prior imaging  Neurosurgery following  PT/OT/SLP following, continue therapy    S/p craniotomy 3/25  Cont Neuro checks  BP control  Sz prophylaxis  PT/OT/SLP    3/30: As per neurosurgery, patient able to be discharged to skilled nursing facility when ready.  Staples and sutures out 2 weeks postop, follow-up with [facility] neurosurgery group in 4 weeks with a new head CT as per neurosurgery note, their office will arrange it.  Neurosurgery has also increased salt tablets to 2 g 3 times daily with Florinef.    3/31: Patient seems more lethargic this morning.  Neurosurgery repeated CT scan which did not reveal acute abnormality.  Neurosurgery would like to have an MRI of the brain, patient with pacemaker we will inquire if it is compatible with MRI machine.  Neurosurgery also recommends starting the patient on Decadron 4 mg every 6 hours for now.    4/1: Patient seems to be more awake today.  Unable to do MRI due to pacemaker.  We have switched Keppra to Vimpat due to Keppra possibly causing thrombocytopenia.  I talked to Dr. Aranda from neurology and he recommended to start Vimpat at 100 mg twice daily.    4/2: We have started the tapering of Decadron as per neurosurgery's recommendation.      History of prosthetic mitral valve  Assessment & Plan  With paroxysmal atrial fibrillation  Anticoagulation reversed given subdural hematoma  Resume anticoagulation when bleeding risk felt to be acceptable by neurosurgery and hematology due to ongoing thrombocytopenia.    COPD (chronic obstructive pulmonary disease) (HCC)- (present on admission)  Assessment & Plan  Patient not in acute exacerbation or requiring oxygen  O2/RT  protocols    Gout- (present on admission)  Assessment & Plan  Due to ongoing thrombocytopenia and as per hematology's recommendation we will hold allopurinol for now.  Patient currently receiving steroids.    Type 2 diabetes mellitus without complication, without long-term current use of insulin (HCC)- (present on admission)  Assessment & Plan  Patient with hyperglycemia worsened by steroids  A1C 5.9  lantus 15 units  SSI  Well controled on current regimen however will likely need to cut back lantus as decadron is tapered off    3/30: We will stop lantus, and continue with ISS. We will monitor and make changes accordingly. Hypoglycemia protocol in place.    3/31: As per neurosurgery we have started the patient on Decadron 4 mg every 6 hours, we will monitor glucose closely if needed we will restart Lantus.    4/5: Patient currently on decadron tapering. We will continue ISS and hypoglycemia protocol for now. Monitor and make changes accordingly.        Atrial fibrillation (HCC)  Assessment & Plan  Holding anticoagulation in setting of acute SDH  Rate control with metoprolol  Cont Tele monitoring    3/31: As per neurosurgery patient to resume anticoagulation in 2 weeks, unless he requires new neurosurgical intervention.    4/5: Due to ongoing thrombocytopenia of unknown etiology at the moment, we will not start anticoagulation until approved by hematology and neurosurgery.    Seizure (HCC)  Assessment & Plan  Secondary to SDH  Witnessed event: pt started with aphasia then gaze went upward and fixed with head tremor.  Pt was then unresponsive.  Episode lasting approx 1 min with rapid return to baseline   2 Szs on 3/28 none since  Cont Keppra 1g BID    4/1: Due to ongoing thrombocytopenia as per hematology Keppra can sometimes cause low platelets, so we have switched to Vimpat as per neurology's recommendation.    Cognitive deficits  Assessment & Plan  Evaluated by SLP and found to have cognitive deficits, recommending  SNF  Cognitive deficits likely secondary to SDH  Discussed with friend, he is not at previous baseline    3/23 Will likely need SNF placement, order placed  CM to assist  Accepted by HH  Noncompliance with pt/ot  Encourage activity    4/5: Pending placement.    Alcoholic cirrhosis of liver without ascites (HCC)  Assessment & Plan  Patient reports history of heavy alcohol use, has not drank in few years  Will need GI follow up as outpatient  Denies previous diagnosis of liver disease  Cirrhotic appearing liver on imaging without ascites  Hep panel neg  Ammonia 26    4/5: Mild elevation of ALT, in the setting of liver disease. Patient currently not complaining of abdominal pain. If it continues to trend upwards or develops new abdominal pain we will consider re-imaging.    Venous stasis dermatitis of right lower extremity  Assessment & Plan  Does not appear infected on examination  Continue skin care per nursing protocol    Grade IV hemorrhoids  Assessment & Plan  Patient reports chronic hemorrhoids with occasional mild rectal bleeding  lidocaine gel for pain relief        Hyponatremia  Assessment & Plan  Asymptomatic hyponatremia  Hyponatremia worsening 3/22 now down to 126  3/23 stable at 129, cont current management  Likely SIADH  Free water restriction  Due to subdural hematoma will start salt supplementation    4/5: Continue salt tablets and florinef as per neurosurgery recommendations.    Valvular cardiomyopathy (HCC)- (present on admission)  Assessment & Plan  History of bioprosthetic mitral valve replacement    Hypertension  Assessment & Plan  Patient on metoprolol and lisinopril  PRN medications  Monitor and make changes accordingly    Pacemaker  Assessment & Plan  History of pacemaker limiting ability to obtain MRI    Obesity (BMI 30-39.9)- (present on admission)  Assessment & Plan  Will need weight loss program on discharge  Patient counseled on lifestyle modifications  Will need close follow up as  outpatient.    Benign prostatic hyperplasia without lower urinary tract symptoms- (present on admission)  Assessment & Plan  Continue Flomax  No evidence of urinary obstruction       VTE prophylaxis: SCDs    I certify that the patient requires continued medically necessary hospital services for the treatment of SDH and will remain in the hospital until neurosurgery feels patient is safe for discharge,.  Discharge plan is anticipated to be to SNF.

## 2021-04-06 NOTE — PROGRESS NOTES
Halina from Lab called with critical result of platelets 45 at 2015. Critical lab result read back to Gil AKERS.   Dr. Rosado notified of critical lab result at 2040.  Critical lab result read back by Dr. Rosado.

## 2021-04-06 NOTE — CARE PLAN
Problem: Discharge Barriers/Planning  Goal: Patient's continuum of care needs will be met  Outcome: PROGRESSING SLOWER THAN EXPECTED  Note: D/c pending d/t pt's platelets is low. SW involve in care, will cont to monitor     Problem: Metabolic:  Goal: Ability to maintain appropriate glucose levels will improve  Outcome: PROGRESSING AS EXPECTED  Note: FSBS implemented to monitor pt's BS, insulin on board, no s/s of complications

## 2021-04-06 NOTE — THERAPY
Speech Language Pathology  Daily Treatment     Patient Name: Morales Olivier  Age:  65 y.o., Sex:  male  Medical Record #: [id number]  Today's Date: 4/6/2021     Precautions: Fall Risk, Swallow Precautions ( See Comments)  Comments: hx of seizures     Assessment    Patient was seen on this date for dysphagia treatment. Patient seen with 100% of MM5 meal tray during lunch, 12 oz thin liquids, and PO trials of soft and regular solids in mixed consistency form. Patient was impulsive initially consuming 4 oz of thins in 5-6 consecutive swallows. However, had no overt s/sx of aspiration and vocal quality remained clear. Pt adhering to swallow precautions given mod fade to min verbal cues for remainder of session and taking 1-2 sips at a time from cup. Mastication mildly prolonged with more fibrous solids but overall appeared functional for easy to chew solids. Education provided to pt regarding current status and SLP recs.     Plan    Recommend upgrade to an Easy-to-Chew (level 7) and Thin Liquid (level 0) diet given assistance with tray set up and close monitoring.     Continue current treatment plan.    Discharge Recommendations: Recommend post-acute placement for additional speech therapy services prior to discharge home     Objective       04/06/21 1225   Vitals   O2 Delivery Device None - Room Air   Dysphagia    Positioning / Behavior Modification Modulate Rate or Bite Size;Self Monitoring   Other Treatments MM5/TN0 meal tray and PO trials of mixed and regular solids   Diet / Liquid Recommendation Regular - Easy to Chew (7);Thin (0)   Skilled Intervention Compensatory Strategies;Verbal Cueing   Recommended Route of Medication Administration   Medication Administration  Whole with Liquid Wash   Short Term Goals   Short Term Goal # 1 NEW 4/6: Patient will consume an EC7/TN0 diet with no overt s/sx of aspiration given initial close monitoring.

## 2021-04-06 NOTE — CARE PLAN
Problem: Knowledge Deficit  Goal: Knowledge of disease process/condition, treatment plan, diagnostic tests, and medications will improve  Outcome: PROGRESSING AS EXPECTED  Note: Educated patient about current POC, activities, encouraging patient to ask questions regarding care plan, providing answers to patient's questions, educating patient about medications, encouraging patient involvement in care process. Continuing with current POC.       Problem: Safety  Goal: Will remain free from injury  Outcome: PROGRESSING AS EXPECTED  Note: Reviewed patient's mobility status due to deficits, discussed with care team of patient needs, verifying appropriate safety precautions in place, providing patient education, ensuring call lights are within reach, non-slip socks in use, evaluating needs alarms & monitoring every shift, continuing with current plan of care.

## 2021-04-07 NOTE — CARE PLAN
Problem: Knowledge Deficit  Goal: Knowledge of disease process/condition, treatment plan, diagnostic tests, and medications will improve  Outcome: PROGRESSING AS EXPECTED     Problem: Discharge Barriers/Planning  Goal: Patient's continuum of care needs will be met  Outcome: PROGRESSING AS EXPECTED     Problem: Skin Integrity  Goal: Risk for impaired skin integrity will decrease  Outcome: PROGRESSING AS EXPECTED     Problem: Safety  Goal: Will remain free from injury  Outcome: PROGRESSING AS EXPECTED  Goal: Will remain free from falls  Outcome: PROGRESSING AS EXPECTED  Note: Call bell within reach and bed alarm on     Problem: Mobility  Goal: Risk for activity intolerance will decrease  Outcome: PROGRESSING AS EXPECTED  Note: Pt refused to ambulate to bathroom or BSC, will try again in the morning. Educated pt on the importance of ambulating and getting out of bed.      Problem: Safety:  Goal: Will remain free from injury  Outcome: PROGRESSING AS EXPECTED     Problem: Knowledge Deficit:  Goal: Knowledge of disease process/condition, treatment plan, diagnostic tests, and medications will improve  Outcome: PROGRESSING AS EXPECTED

## 2021-04-07 NOTE — THERAPY
04/07/21 1539   Other Treatments   Other Treatments Provided Pt on hold from PT today, transfusing platelets. PT will resume tomorrow if medically able to participate.

## 2021-04-07 NOTE — PROGRESS NOTES
Lab called with critical result of Platlet at 42. Critical lab result read back   Dr. Martin notified of critical lab result at 0540.  Critical lab result read back by Dr. Martin.

## 2021-04-07 NOTE — PROGRESS NOTES
"[facility] Medicine Daily Progress Note    Date of Service  4/6/2021    Chief Complaint  65 y.o. male admitted 3/12/2021 with altered mentation.    Hospital Course  64yo PMHx ETOH, AFIb, anticoagulation with warfarin, CAD, bioprosthetic MVR, gout, HTN, PPM, DVT/PE, DM.  Admitted after EMS called (friend didn't hear from patient in 3 days) and found pt with R side deficits.  In ED CT demonstrating L wholohemispheric SDH.  INR reversed with KCentra.  Initially treated non operatively however surveilance CT morena 3/22 showed the SDH was enlarging so taken to the OR.  Angiogram on 3/15 neg for vascular abnormalities.  On 3/25 taken to OR for L craniotomy and evacuation by Dr Hudson.  On 3/28 witnessed Sz, patient started on keppra then switched to Vimpat d/t thrombocytopenia.  Patient also started on dexamethasone, salt tabs and Florinef.  Patient had ongoing severe thrombocytopenia.  Hematology consulted.  Negative for HIT.  Received platelet transfusions for platelets less than 40.  Patient started on IVIG on 4/4.       Interval Problem Update  -No acute overnight events  -Hematology recommended further lab work-up for thrombocytopenia  -Platelets 44 (stable)  -Patient reports having pain \"all over\"  -Afebrile    Consultants/Specialty  Neurosurgery  Hematology    Code Status  Full Code    Disposition  SNF once medically cleared.    Review of Systems  Review of Systems   Constitutional: Negative for chills and fever.   HENT: Negative for nosebleeds and sore throat.    Eyes: Negative for blurred vision and double vision.   Respiratory: Negative for cough and shortness of breath.    Cardiovascular: Negative for chest pain, palpitations and leg swelling.   Gastrointestinal: Negative for abdominal pain, diarrhea, nausea and vomiting.   Genitourinary: Negative for dysuria and urgency.   Musculoskeletal: Negative for back pain.   Skin: Negative for rash.   Neurological: Positive for weakness and headaches. Negative for dizziness " and loss of consciousness.        Physical Exam  Temp:  [36.3 °C (97.4 °F)-36.8 °C (98.2 °F)] 36.8 °C (98.2 °F)  Pulse:  [79-80] 80  Resp:  [16-18] 16  BP: (119-129)/(77-92) 119/77  SpO2:  [96 %-98 %] 98 %    Physical Exam  Vitals reviewed.   Constitutional:       General: He is not in acute distress.     Appearance: Normal appearance. He is well-developed. He is not diaphoretic.   HENT:      Head: Normocephalic and atraumatic.      Comments: L scalp incision noted.  No signs of infection     Nose: Nose normal.      Mouth/Throat:      Mouth: Mucous membranes are moist.   Eyes:      General: No scleral icterus.     Extraocular Movements: Extraocular movements intact.      Conjunctiva/sclera: Conjunctivae normal.      Pupils: Pupils are equal, round, and reactive to light.   Neck:      Vascular: No JVD.   Cardiovascular:      Rate and Rhythm: Normal rate and regular rhythm.      Heart sounds: No murmur. No gallop.    Pulmonary:      Effort: Pulmonary effort is normal. No respiratory distress.      Breath sounds: No stridor. No wheezing or rales.   Abdominal:      General: Bowel sounds are normal. There is no distension.      Palpations: Abdomen is soft.      Tenderness: There is no abdominal tenderness. There is no guarding or rebound.   Musculoskeletal:         General: No tenderness.      Cervical back: Normal range of motion.      Right lower leg: No edema.      Left lower leg: No edema.   Skin:     General: Skin is warm and dry.      Findings: No rash.   Neurological:      General: No focal deficit present.      Mental Status: He is alert and oriented to person, place, and time.   Psychiatric:         Mood and Affect: Mood normal.         Behavior: Behavior normal.         Thought Content: Thought content normal.         Fluids    Intake/Output Summary (Last 24 hours) at 4/6/2021 1939  Last data filed at 4/6/2021 1500  Gross per 24 hour   Intake 360 ml   Output 1100 ml   Net -740 ml       Laboratory  Recent Labs      04/05/21  0908 04/05/21  1922 04/06/21  0400   WBC 3.3* 3.9* 3.8*   RBC 3.63* 3.22* 3.45*   HEMOGLOBIN 11.7* 10.7* 11.4*   HEMATOCRIT 36.3* 31.9* 34.8*   .0* 99.1* 100.9*   MCH 32.2 33.2* 33.0   MCHC 32.2* 33.5* 32.8*   RDW 59.8* 59.4* 61.2*   PLATELETCT 42* 45* 44*   MPV 11.6 11.0 12.4     Recent Labs     04/04/21  0353 04/05/21  0908 04/06/21  0400   SODIUM 131* 130* 133*   POTASSIUM 4.6 4.4 4.5   CHLORIDE 101 99 100   CO2 25 26 25   GLUCOSE 157* 158* 154*   BUN 21 25* 22   CREATININE 0.72 0.85 0.79   CALCIUM 8.2* 8.5 8.4*                   Imaging  CT-HEAD W/O   Final Result      No significant interval change from one day prior.      CT-HEAD W/O   Final Result      1.  There is overall stable residual left frontal subdural and subarachnoid hemorrhage.   2.  There is left sided hemispheric edema with 3 mm of left-to-right midline shift, previously 4 mm.   3.  There is no new hemorrhage.   4.  There has been slight interval resorption of pneumocephalus with otherwise stable left craniotomy changes.   5.  Focal hypodensity in the inferior left frontal lobe again seen most consistent with an area of evolving ischemia.   6.  Underlying atrophy and white matter disease again noted.      CT-HEAD W/O   Final Result      1.  Stable sequela of left craniotomy, with stable left-sided subarachnoid and subdural hemorrhage.   2.  Residual 4 mm left-to-right midline shift.   3.  No CT evidence of new hemorrhage or large vascular territory infarct.      CT-HEAD W/O   Final Result      Interval left craniotomy with overall decrease in volume of subdural hematoma with some postsurgical extra-axial hemorrhage deep to the craniotomy flap with associated subarachnoid hemorrhage.      Persistent mass effect and midline shift from left-to-right of approximately 5 mm.      CT-HEAD W/O   Final Result      Left extra-axial hematoma is again noted. Subacute blood closer to the vertex along the left frontoparietal lobes is  mildly increased compared to prior measuring 1.2 compared to 1 cm.      Mass effect is again noted with 4.6 mm midline shift to the right, slightly increased compared to prior.      CT-ABDOMEN-PELVIS WITH   Final Result      1.  Ascites is no longer identified.      2.  Liver surface is mildly nodular consistent with cirrhosis.      3.  Splenomegaly is again noted. Punctate focus of decreased attenuation in the spleen could be due to normal differential enhancement although small hemangioma is also possible.      4.  Ventral abdominal wall hernia containing abdominal fat is again identified.      5.  Consolidation and volume loss is noted posteriorly in the right lower lobe which could indicate pneumonia.      6.  Diverticulosis.         DX-CHEST-PORTABLE (1 VIEW)   Final Result      Small right pleural effusion.      Right basilar opacities may represent atelectasis or scarring.         CT-HEAD W/O   Final Result      1.  Stable size of the extra-axial hemorrhage overlying the left cerebral hemisphere with sulci effacement and approximately 3 mm of left-to-right midline shift.         IR-CAROTID-CEREBRAL BILATERAL   Final Result         1.  Normal cerebral arteriogram.      2.  No evidence of arteriovenous malformation or intracranial aneurysm formation.      3.  No evidence of atherosclerotic disease in the right common femoral artery.      CT-CTA HEAD WITH & W/O-POST PROCESS   Final Result      No evidence of occlusive lesion or aneurysm.      Multiple tortuous small vessels along the anterior-inferior aspect of the left temporal lobe could represent some type of vascular malformation.      No significant interval change in left-sided holohemispheric subdural hematoma extending along the falx and layering over the tentorium.      CT-CTA NECK WITH & W/O-POST PROCESSING   Final Result      CT angiogram of the neck within normal limits.      CT-HEAD W/O   Final Result         1. Grossly unchanged left hemispheric  subdural hematoma, most in the left frontal and temporal region.   2. No new intracranial hemorrhage.                  CT-HEAD W/O   Final Result      1.  Stable large LEFT hemispheric subdural hematoma with associated mass effect and midline shift.   2.  No significant change from prior exam obtained 2 hours earlier.      CT-HEAD W/O   Final Result      Large LEFT holohemispheric subdural hematoma with associated mass effect and 4 mm LEFT to RIGHT midline shift.      These findings were discussed with EFREM FITZGERALD on 3/12/2021 11:40 AM.      DX-CHEST-PORTABLE (1 VIEW)   Final Result      1.  Hypoinflation, improved from prior.   2.  Minimal RIGHT lung base atelectasis or scar.   3.  Minimal RIGHT pleural effusion.           Assessment/Plan  * Thrombocytopenia (HCC)  Assessment & Plan  Likely due to cirrhosis  Continue to monitor    3/31: While thrombocytopenia could have a component of cirrhosis.  The patient's platelet count on 18 March was 221.  We have consulted hematology for further recommendations.      4/1: Hematology evaluating the patient.  We have sent HIT antibodies and MERCY, we will require MERCY confirmation before starting argatroban.  Hematology would like to switch Keppra to a different antiseizure medication, and we have talked with neurology who recommended Vimpat.  Platelets this morning were 46, as per hematology we will repeat CBC this afternoon and if less than 40 we will transfuse 1 unit of platelets.    4/3: Again platelets this morning were 43.  We will repeat CBC later today again.  If platelets less than 40 we will transfuse 1 unit of platelets as per hematology's recommendation.  We are pending MERCY results.  We appreciate further recommendations by hematology.    4/4: Platelets this morning were 35 and the patient received 1 unit of platelets.  Hematology also started the patient on IVIG.  We appreciate further recommendations by hematology.  We are still pending MERCY results.    4/5:  Platelets were 42 this morning. MERCY is negative, which basically rules out HIT. Hematology still following and have ordered more diagnostic labs. We will not restart anticoagulation until approved by hematology and neurosurgery.      Subdural hematoma, acute (HCC)  Assessment & Plan  Continues to have headache  Neurosurgery following and recommending continuing steroids and Keppra for seizure prophylaxis. On steroid taper  Repeat CT scan 3/22: 1.2cm extra axial hematoma (from 1cm) with 4.6mm midline shift to right slightly greater compared to prior imaging  Neurosurgery following  PT/OT/SLP following, continue therapy    S/p craniotomy 3/25  Cont Neuro checks  BP control  Sz prophylaxis  PT/OT/SLP    3/30: As per neurosurgery, patient able to be discharged to skilled nursing facility when ready.  Staples and sutures out 2 weeks postop, follow-up with [facility] neurosurgery group in 4 weeks with a new head CT as per neurosurgery note, their office will arrange it.  Neurosurgery has also increased salt tablets to 2 g 3 times daily with Florinef.    3/31: Patient seems more lethargic this morning.  Neurosurgery repeated CT scan which did not reveal acute abnormality.  Neurosurgery would like to have an MRI of the brain, patient with pacemaker we will inquire if it is compatible with MRI machine.  Neurosurgery also recommends starting the patient on Decadron 4 mg every 6 hours for now.    4/1: Patient seems to be more awake today.  Unable to do MRI due to pacemaker.  We have switched Keppra to Vimpat due to Keppra possibly causing thrombocytopenia.  I talked to Dr. Aranda from neurology and he recommended to start Vimpat at 100 mg twice daily.    4/2: We have started the tapering of Decadron as per neurosurgery's recommendation.      History of prosthetic mitral valve  Assessment & Plan  With paroxysmal atrial fibrillation  Anticoagulation reversed given subdural hematoma  Resume anticoagulation when bleeding risk felt  to be acceptable by neurosurgery and hematology due to ongoing thrombocytopenia.    COPD (chronic obstructive pulmonary disease) (HCC)- (present on admission)  Assessment & Plan  Patient not in acute exacerbation or requiring oxygen  O2/RT protocols    Gout- (present on admission)  Assessment & Plan  Due to ongoing thrombocytopenia and as per hematology's recommendation we will hold allopurinol for now.  Patient currently receiving steroids.    Type 2 diabetes mellitus without complication, without long-term current use of insulin ([facility])- (present on admission)  Assessment & Plan  Patient with hyperglycemia worsened by steroids  A1C 5.9  lantus 15 units  SSI  Well controled on current regimen however will likely need to cut back lantus as decadron is tapered off    3/30: We will stop lantus, and continue with ISS. We will monitor and make changes accordingly. Hypoglycemia protocol in place.    3/31: As per neurosurgery we have started the patient on Decadron 4 mg every 6 hours, we will monitor glucose closely if needed we will restart Lantus.    4/5: Patient currently on decadron tapering. We will continue ISS and hypoglycemia protocol for now. Monitor and make changes accordingly.        Atrial fibrillation (HCC)  Assessment & Plan  Holding anticoagulation in setting of acute SDH  Rate control with metoprolol  Cont Tele monitoring    3/31: As per neurosurgery patient to resume anticoagulation in 2 weeks, unless he requires new neurosurgical intervention.    4/5: Due to ongoing thrombocytopenia of unknown etiology at the moment, we will not start anticoagulation until approved by hematology and neurosurgery.    Seizure (HCC)  Assessment & Plan  Secondary to SDH  Witnessed event: pt started with aphasia then gaze went upward and fixed with head tremor.  Pt was then unresponsive.  Episode lasting approx 1 min with rapid return to baseline   2 Szs on 3/28 none since  Cont Keppra 1g BID    4/1: Due to ongoing  thrombocytopenia as per hematology Keppra can sometimes cause low platelets, so we have switched to Vimpat as per neurology's recommendation.    Cognitive deficits  Assessment & Plan  Evaluated by SLP and found to have cognitive deficits, recommending SNF  Cognitive deficits likely secondary to SDH  Discussed with friend, he is not at previous baseline    3/23 Will likely need SNF placement, order placed  CM to assist  Accepted by HH  Noncompliance with pt/ot  Encourage activity    4/5: Pending placement.    Alcoholic cirrhosis of liver without ascites (HCC)  Assessment & Plan  Patient reports history of heavy alcohol use, has not drank in few years  Will need GI follow up as outpatient  Denies previous diagnosis of liver disease  Cirrhotic appearing liver on imaging without ascites  Hep panel neg  Ammonia 26    4/5: Mild elevation of ALT, in the setting of liver disease. Patient currently not complaining of abdominal pain. If it continues to trend upwards or develops new abdominal pain we will consider re-imaging.    Venous stasis dermatitis of right lower extremity  Assessment & Plan  Does not appear infected on examination  Continue skin care per nursing protocol    Grade IV hemorrhoids  Assessment & Plan  Patient reports chronic hemorrhoids with occasional mild rectal bleeding  lidocaine gel for pain relief        Hyponatremia  Assessment & Plan  Asymptomatic hyponatremia  Hyponatremia worsening 3/22 now down to 126  3/23 stable at 129, cont current management  Likely SIADH  Free water restriction  Due to subdural hematoma will start salt supplementation    4/5: Continue salt tablets and florinef as per neurosurgery recommendations.    Valvular cardiomyopathy (HCC)- (present on admission)  Assessment & Plan  History of bioprosthetic mitral valve replacement    Hypertension  Assessment & Plan  Patient on metoprolol and lisinopril  PRN medications  Monitor and make changes accordingly    Pacemaker  Assessment &  Plan  History of pacemaker limiting ability to obtain MRI    Obesity (BMI 30-39.9)- (present on admission)  Assessment & Plan  Will need weight loss program on discharge  Patient counseled on lifestyle modifications  Will need close follow up as outpatient.    Benign prostatic hyperplasia without lower urinary tract symptoms- (present on admission)  Assessment & Plan  Continue Flomax  No evidence of urinary obstruction       VTE prophylaxis: SCDs    I certify that the patient requires continued medically necessary hospital services for the treatment of SDH and will remain in the hospital until neurosurgery feels patient is safe for discharge,.  Discharge plan is anticipated to be to SNF.

## 2021-04-07 NOTE — PROGRESS NOTES
Oncology/Hematology Progress Note               Author: Ventura Lange M.D.    Cc: Thrombocytopenia Date & Time created: 4/7/2021  11:22 AM     Interval History:    Seen at bedside while his friend was bedside during visiting hours.  Friend states he is much improved but not cognitively back at baseline.  Patient is complaining of headache at surgical site.  Patient also endorses weakness, and is wondering how long it will take for his strength to return.  Denies any new bleeding or rash.  Platelets 42 today.      PMHx, PSurgHx, SocHx, FAMHx:  All reviewed and no changes    Review of Systems:  Review of Systems   Constitutional: Positive for malaise/fatigue. Negative for chills and fever.   HENT: Negative for congestion, nosebleeds and sinus pain.    Eyes: Negative for pain, discharge and redness.   Respiratory: Negative for cough, hemoptysis, sputum production and shortness of breath.    Cardiovascular: Negative for chest pain, palpitations and leg swelling.   Gastrointestinal: Negative for abdominal pain, constipation, diarrhea, heartburn, nausea and vomiting.   Genitourinary: Negative for dysuria, frequency and urgency.   Musculoskeletal: Positive for back pain and joint pain.   Skin: Negative for itching and rash.   Neurological: Positive for weakness and headaches.       Physical Exam:  Physical Exam  Vitals reviewed.   Constitutional:       General: He is not in acute distress.     Appearance: Normal appearance. He is not ill-appearing or toxic-appearing.   HENT:      Head: Normocephalic.      Comments: Prior left craniotomy surgical changes     Mouth/Throat:      Mouth: Mucous membranes are moist.      Pharynx: Oropharynx is clear. No posterior oropharyngeal erythema.   Eyes:      General: No scleral icterus.     Extraocular Movements: Extraocular movements intact.      Conjunctiva/sclera: Conjunctivae normal.   Cardiovascular:      Rate and Rhythm: Normal rate and regular rhythm.      Heart sounds: No  murmur. No gallop.    Pulmonary:      Effort: Pulmonary effort is normal. No respiratory distress.      Breath sounds: Normal breath sounds. No wheezing or rales.   Abdominal:      General: Abdomen is flat. Bowel sounds are normal. There is no distension.      Palpations: Abdomen is soft.      Tenderness: There is no abdominal tenderness. There is no guarding.   Musculoskeletal:         General: No tenderness. Normal range of motion.      Cervical back: Normal range of motion and neck supple. No tenderness.      Right lower leg: No edema.      Left lower leg: No edema.   Lymphadenopathy:      Cervical: No cervical adenopathy.   Skin:     General: Skin is warm and dry.      Coloration: Skin is not jaundiced.      Findings: No erythema or rash.   Neurological:      General: No focal deficit present.      Mental Status: He is alert and oriented to person, place, and time.   Psychiatric:         Mood and Affect: Mood normal.         Thought Content: Thought content normal.         Judgment: Judgment normal.         Labs:          Recent Labs     04/05/21 0908 04/06/21 0400 04/07/21 0400   SODIUM 130* 133* 130*   POTASSIUM 4.4 4.5 4.3   CHLORIDE 99 100 100   CO2 26 25 25   BUN 25* 22 20   CREATININE 0.85 0.79 0.72   MAGNESIUM 1.7 1.9  --    CALCIUM 8.5 8.4* 8.3*     Recent Labs     04/05/21 0908 04/06/21  0400 04/07/21  0400   ALTSGPT 123* 119*  --    ASTSGOT 35 32  --    ALKPHOSPHAT 90 84  --    TBILIRUBIN 0.6 0.4  --    GLUCOSE 158* 154* 171*     Recent Labs     04/05/21  0908 04/05/21  1455 04/05/21  1922 04/06/21  0400 04/07/21  0400   RBC   < >  --  3.22* 3.45* 3.35*   HEMOGLOBIN   < >  --  10.7* 11.4* 11.2*   HEMATOCRIT   < >  --  31.9* 34.8* 32.4*   PLATELETCT   < >  --  45* 44* 42*   PROTHROMBTM  --  15.9*  --   --   --    APTT  --  22.8*  --   --   --    INR  --  1.23*  --   --   --     < > = values in this interval not displayed.     Recent Labs     04/05/21 0908 04/05/21  0908 04/05/21  1928  21  0400 21  0400   WBC 3.3*   < > 3.9* 3.8* 3.6*   NEUTSPOLYS 84.50*   < > 83.20* 76.90* 77.10*   LYMPHOCYTES 8.80*   < > 9.90* 14.30* 14.30*   MONOCYTES 5.20   < > 5.30 6.90 7.20   EOSINOPHILS 0.00   < > 0.00 0.00 0.00   BASOPHILS 0.00   < > 0.30 0.30 0.00   ASTSGOT 35  --   --  32  --    ALTSGPT 123*  --   --  119*  --    ALKPHOSPHAT 90  --   --  84  --    TBILIRUBIN 0.6  --   --  0.4  --     < > = values in this interval not displayed.     Recent Labs     21  0908 21  0400   SODIUM 130* 133* 130*   POTASSIUM 4.4 4.5 4.3   CHLORIDE 99 100 100   CO2 26 25 25   GLUCOSE 158* 154* 171*   BUN 25* 22 20   CREATININE 0.85 0.79 0.72   CALCIUM 8.5 8.4* 8.3*     Hemodynamics:  Temp (24hrs), Av.5 °C (97.7 °F), Min:36.2 °C (97.2 °F), Max:36.8 °C (98.2 °F)  Temperature: 36.2 °C (97.2 °F)  Pulse  Av.7  Min: 34  Max: 92   Blood Pressure : 131/99     Respiratory:    Respiration: 16, Pulse Oximetry: 100 %     Work Of Breathing / Effort: Within Normal Limits  RUL Breath Sounds: Clear, RML Breath Sounds: Clear, RLL Breath Sounds: Diminished, ROBERT Breath Sounds: Clear, LLL Breath Sounds: Diminished  Fluids:    Intake/Output Summary (Last 24 hours) at 2021 1258  Last data filed at 2021 0540  Gross per 24 hour   Intake 200 ml   Output 600 ml   Net -400 ml        GI/Nutrition:  Orders Placed This Encounter   Procedures   • Diet Order Diet: Level 7 - Easy to Chew; Liquid level: Level 0 - Thin; Second Modifier: (optional): Consistent CHO (Diabetic)     Standing Status:   Standing     Number of Occurrences:   1     Order Specific Question:   Diet:     Answer:   Level 7 - Easy to Chew [22]     Order Specific Question:   Liquid level     Answer:   Level 0 - Thin     Order Specific Question:   Second Modifier: (optional)     Answer:   Consistent CHO (Diabetic) [4]     Medical Decision Making, by Problem:  Active Hospital Problems    Diagnosis    • *Thrombocytopenia (HCC) [D69.6]    •  Subdural hematoma, acute (HCC) [S06.5X9A]    • History of prosthetic mitral valve [Z95.2]    • COPD (chronic obstructive pulmonary disease) (HCC) [J44.9]    • Gout [M10.9]    • Type 2 diabetes mellitus without complication, without long-term current use of insulin (HCC) [E11.9]    • Atrial fibrillation (HCC) [I48.91]    • Pacemaker [Z95.0]    • Benign prostatic hyperplasia without lower urinary tract symptoms [N40.0]    • Obesity (BMI 30-39.9) [E66.9]    • Seizure (HCC) [R56.9]    • Cognitive deficits [R41.89]    • Generalized abdominal pain [R10.84]    • Alcoholic cirrhosis of liver without ascites (HCC) [K70.30]    • Hyponatremia [E87.1]    • Grade IV hemorrhoids [K64.3]    • Venous stasis dermatitis of right lower extremity [I87.2]    • Valvular cardiomyopathy (HCC) [I42.8]        Plan:  1.  Thrombocytopenia--uncertain etiology.  He has some degree of liver cirrhosis, with a chronic platelet count of 110s to 130s.  There is no evidence of DIC with a fibrinogen of 212.  His testing for hepatitis B, hepatitis C, and HIV were negative.  Historically not consistent with TTP/HUS.  There was worry for HIT, with a positive platelet antibody, but he did have a low 4T score and negative [facility] testing essentially rules out HIT.         Not likely to be ITP.  He was on high-dose steroids with no improvement.  Would be unusual to see acute evolution of ITP while in the hospital as well.  He did get 1 dose of IVIG on 4/4 with no benefit.  He does respond to platelet transfusions, although his counts dropped down quickly again.        When he was admitted on 3/12 his platelets were 152.  For the first week they state around the 110s to 150s.  During the second week of his admission they dropped down.  This suggests something acute that happened in the hospital.  My suspicion is it was related to either the Keppra or the allopurinol--both of which were stopped on 4/1.  It may take some time for his counts to recover if it is  medication induced.  Sometimes this can take 2 to 4 weeks.    --B12, folate WNL  --SPEP/JORDAN pending  --SPENCER, ESR, lupus anticoagulant WNL.  RF mildy elevated at 18  --Transfuse if platelets go below 40 (or if any new bleeding, keep platelets above 50K)    --today we will transfuse 1 unit platelets and draw 1 hour post CBC, to see response to transfusion, a good response would be more reassuring that this is not autoimmune      2.  SDH--management per medicine team and neurosurgery.  Has had no reaccumulation since his operative evacuation of the subdural.  --Medicine team following  --Neurosurgery following  --We need neurosurgery to weigh in on when he would be safe to restart anticoagulation, assuming that he had a normal platelet count.  In other words for the average patient how long do they hold anticoagulation after a SDH evacuation?      3.  Mitral valve replacement/atrial fibrillation --he was previously on Coumadin.  All anticoagulation has been stopped, given his subdural hematoma.  --Hold anticoagulation until he is medically safe for this  --Currently high risk of bleeding with thrombocytopenia    --We need cardiology to weigh in on what his risk of clotting is with the valve replacement, off of anticoagulation.  In other words how high risk is he for being off of anticoagulation for a prolonged period of time.        --If his platelets were normal, I would like neurosurgery to weigh in on how long they would keep him off of anticoagulation.  Currently I am not sure if we were holding off on anticoagulation primarily because of the recent SDH evacuation, or if neurosurgery would have cleared him for anticoagulation by now except for his low platelets.  It would also be helpful to have cardiology weigh in on the risk of holding off of anticoagulation for a prolonged period of time.  This would help me to decide how aggressive I need to be with platelet transfusion.  If cardiology felt he was high risk for  blood clots and wanted him on anticoagulation ASAP, and neurosurgery felt that if his platelets are greater than 50 he would be safe for anticoagulation, then we could be more aggressive with transfusions to keep his platelets above 50 and allow him to resume anticoagulation.        Highly complex patient with a high risk of morbidity and mortality.  We will continue to monitor.    Quality-Core Measures   Reviewed items::  Labs reviewed and Medications reviewed  DVT prophylaxis pharmacological::  Contraindicated - High bleeding risk

## 2021-04-07 NOTE — PROGRESS NOTES
Neurosurgery Progress Note    Subjective:  No events over night  Pt in bed c/o pain all over  Ambulating some- feels unsteady      Exam:  A/Ox4, speech intact   PERRL, EOMI  No drift  Mild right droop   Right 4++/5, Left 5/5  Incision C/d/i with staples      BP  Min: 114/89  Max: 131/99  Pulse  Av.8  Min: 80  Max: 81  Resp  Av.5  Min: 16  Max: 18  Temp  Av.5 °C (97.7 °F)  Min: 36.2 °C (97.2 °F)  Max: 36.8 °C (98.2 °F)  SpO2  Av.3 %  Min: 93 %  Max: 100 %    No data recorded    Recent Labs     21  1922 21  0400 21  0400   WBC 3.9* 3.8* 3.6*   RBC 3.22* 3.45* 3.35*   HEMOGLOBIN 10.7* 11.4* 11.2*   HEMATOCRIT 31.9* 34.8* 32.4*   MCV 99.1* 100.9* 96.7   MCH 33.2* 33.0 33.4*   MCHC 33.5* 32.8* 34.6   RDW 59.4* 61.2* 58.3*   PLATELETCT 45* 44* 42*   MPV 11.0 12.4 10.7     Recent Labs     21  0908 21  0400 21  0400   SODIUM 130* 133* 130*   POTASSIUM 4.4 4.5 4.3   CHLORIDE 99 100 100   CO2 26 25 25   GLUCOSE 158* 154* 171*   BUN 25* 22 20   CREATININE 0.85 0.79 0.72   CALCIUM 8.5 8.4* 8.3*     Recent Labs     21  1455   APTT 22.8*   INR 1.23*           Intake/Output       21 07 - 21 0659 21 - 21 0659      [id number] Total  Total       Intake    P.O.  360  100 460  --  -- --    P.O. 360 100 460 -- -- --    Total Intake 360 100 460 -- -- --       Output    Urine  550  1220 1770  --  -- --    Number of Times Voided 2 x 6 x 8 x -- -- --    Urine Void (mL) 550 1220 1770 -- -- --    Total Output 550 1220 1770 -- -- --       Net I/O     -190 -1120 -1310 -- -- --            Intake/Output Summary (Last 24 hours) at 2021 0941  Last data filed at 2021 0400  Gross per 24 hour   Intake 100 ml   Output 1770 ml   Net -1670 ml            • morphine injection  2 mg Q6HRS PRN   • oxyCODONE immediate-release  5 mg Q6HRS PRN   • dexamethasone  2 mg Q12HRS    Followed by   • [START ON 2021] dexamethasone  2 mg  DAILY    Followed by   • [START ON 4/11/2021] dexamethasone  1 mg DAILY   • lacosamide  100 mg BID   • sodium chloride  2 g TID WITH MEALS   • fludrocortisone  0.1 mg QAM   • metoprolol tartrate  12.5 mg TWICE DAILY   • lisinopril  2.5 mg Q DAY   • artificial tears  1 Application Q2HRS PRN   • Pharmacy Consult Request  1 Each PHARMACY TO DOSE   • MD ALERT...DO NOT ADMINISTER NSAIDS or ASPIRIN unless ORDERED By Neurosurgery  1 Each PRN   • ondansetron  4 mg Q4HRS PRN   • insulin regular  2-9 Units 4X/DAY ACHS    And   • glucose  16 g Q15 MIN PRN    And   • dextrose 50%  50 mL Q15 MIN PRN   • tamsulosin  0.4 mg DAILY   • labetalol  10 mg Q4HRS PRN   • senna-docusate  2 tablet BID    And   • polyethylene glycol/lytes  1 Packet QDAY PRN    And   • magnesium hydroxide  30 mL QDAY PRN    And   • bisacodyl  10 mg QDAY PRN   • Respiratory Therapy Consult   Continuous RT   • LORazepam  2 mg Q5 MIN PRN   • hydrALAZINE  10 mg Q4HRS PRN   • enalaprilat  1.25 mg Q6HRS PRN   • prochlorperazine  10 mg Q6HRS PRN       Assessment and Plan:  POD # 11 Left craniotomy for SDH  NM as above  DC staples 4/10/21  Continue to increase activity - clear to SNF from our perspective  Prophylactic anticoagulation: no     Start date/time: TBD    Witnessed seizures on 3/28: Keppra up to 1000mg BID  Na 130 today, on replacement & florinef  Continue neuro checks and seizure managements  Plts <50k, hematology following

## 2021-04-08 PROBLEM — D61.818 PANCYTOPENIA (HCC): Status: ACTIVE | Noted: 2021-01-01

## 2021-04-08 NOTE — THERAPY
"Physical Therapy   Daily Treatment     Patient Name: Morales Olivier  Age:  65 y.o., Sex:  male  Medical Record #: [id number]  Today's Date: 4/8/2021     Precautions: (P) Fall Risk, Swallow Precautions ( See Comments)    Assessment    Pt willing to participate w/PT this afternoon, in much better spirits. Pt stated he feels \"90%\" better. Pt currently requiring no assist w/bed mobility, min assist w/his functional transfers and his amb efforts. Pt did manage hallway distances today, 100' w/FWW. Overall amb efforts limited by c/o LBP. Pt adamant about going home, does not want to go to any rehab. Pt would benefit from a few more days here inpt and having nrsg amb pt hallway distances @ least 2x's/day if chooses to go home.    Pt would benefit from a PMR consult. Unclear if pt could manage his owns meds.     Plan    Continue current treatment plan.    DC Equipment Recommendations: pt owns a FWW  Discharge Recommendations: Recommend post-acute placement for additional physical therapy services prior to discharge home     Objective       04/08/21 1601   Other Treatments   Other Treatments Provided Extended time needed between tasks, pt chatty.    Balance   Sitting Balance (Static) Fair +   Sitting Balance (Dynamic) Fair   Standing Balance (Static) Fair -   Standing Balance (Dynamic) Fair -   Weight Shift Sitting Fair   Weight Shift Standing Fair   Comments standing w/FWW   Gait Analysis   Gait Level Of Assist Minimal Assist   Assistive Device Front Wheel Walker   Distance (Feet) 100   # of Times Distance was Traveled 1   Deviation Bradykinetic   Skilled Intervention Verbal Cuing   Comments Improvement w/pts amb efforts from last PT session. Pt managed hallway distances w/min assist and FWW. Distance limited by c/o LBP.    Bed Mobility    Supine to Sit Supervised   Sit to Supine Supervised   Scooting Supervised   Rolling Supervised   Comments HOB flat and no railing.    Functional Mobility   Sit to Stand Minimal " Assist  (from EOB->FWW)   Bed, Chair, Wheelchair Transfer Minimal Assist  (w/FWW)   Toilet Transfers Minimal Assist  (w/FWW and grab bars)   Skilled Intervention Verbal Cuing   Comments Pt conts to require min assist w/his functional mobility tasks using the FWW. Pt unsteady standing @ the sink, could self correct w/the sink.    How much difficulty does the patient currently have...   Turning over in bed (including adjusting bedclothes, sheets and blankets)? 3   Sitting down on and standing up from a chair with arms (e.g., wheelchair, bedside commode, etc.) 1   Moving from lying on back to sitting on the side of the bed? 3   How much help from another person does the patient currently need...   Moving to and from a bed to a chair (including a wheelchair)? 3   Need to walk in a hospital room? 3   Climbing 3-5 steps with a railing? 3   6 clicks Mobility Score 16   Short Term Goals    Short Term Goal # 2 Patient will move sitting<>standing with supervision within 6tx in order to initiate gait and transfers   Goal Outcome # 2 Goal not met   Short Term Goal # 3 Patient will ambulate 50ft with supervision within 6tx in order to access environment   Goal Outcome # 3 Goal met

## 2021-04-08 NOTE — THERAPY
Occupational Therapy  Daily Treatment     Patient Name: Morales Olivier  Age:  65 y.o., Sex:  male  Medical Record #: [id number]  Today's Date: 4/8/2021       Precautions: Fall Risk, Swallow Precautions ( See Comments)  Comments: hx of seizures     Assessment    Pt seen for OT tx. Continues to be limited by decreased activity tolerance, balance deficits, inattention, poor safety awareness, poor insight and generalized weakness impacting ability to complete ADLs and ADL transfers independently. Discussed home safety w/ pt. Pt expressing that he wants to return home. Discussed how he was going to be able to complete IADLs. Pt w/ poor problem solving w/ continuing to perseverate on wanting to go home. Amb w/ FWW to BR w/ min A for balance and safety. Heavily using grab bars during toilet transfers. Will continue to benefit from OT services while in house.     Plan    Continue current treatment plan.    DC Equipment Recommendations: Unable to determine at this time  Discharge Recommendations: Recommend post-acute placement for additional occupational therapy services prior to discharge home       04/08/21 1201   Cognition    Cognition / Consciousness X   Safety Awareness Impaired   New Learning Impaired   Attention Impaired   Comments pleasant and cooperative. poor safety awareness    IADL Treatments   IADL Treatments Other   Comments Discussed how pt wants to d/c home and how he would be able to complete his IADLs. Pt reports that he has a friend who will assist him w/ grocery shopping and most of his cooking is microwave items.    Balance   Sitting Balance (Static) Fair   Sitting Balance (Dynamic) Fair   Standing Balance (Static) Fair -   Standing Balance (Dynamic) Fair -   Weight Shift Sitting Fair   Weight Shift Standing Fair   Bed Mobility    Supine to Sit Supervised   Sit to Supine   (up in chair post session)   Scooting Supervised   Rolling Supervised   Activities of Daily Living   Grooming Supervision;Seated    Upper Body Dressing Minimal Assist   Lower Body Dressing Minimal Assist   Toileting Minimal Assist   Functional Mobility   Sit to Stand Minimal Assist   Bed, Chair, Wheelchair Transfer Minimal Assist   Toilet Transfers Minimal Assist   Short Term Goals   Short Term Goal # 1 Pt will demo LB dressing using AE PRN w/ SPV   Goal Outcome # 1 Progressing as expected   Short Term Goal # 2 Pt will demo standing grooming w/ SPV   Goal Outcome # 2 Progressing as expected   Short Term Goal # 3 Pt will demo toilet txf w/ SPV   Goal Outcome # 3 Progressing as expected   Short Term Goal # 4 Pt will demo toilet hygiene w/ SPV   Goal Outcome # 4 Progressing as expected   Anticipated Discharge Equipment and Recommendations   DC Equipment Recommendations Unable to determine at this time   Discharge Recommendations Recommend post-acute placement for additional occupational therapy services prior to discharge home

## 2021-04-08 NOTE — PROGRESS NOTES
[facility] Medicine Daily Progress Note    Date of Service  4/8/2021    Chief Complaint  65 y.o. male admitted 3/12/2021 with altered mentation.    Hospital Course  66yo PMHx ETOH, AFIb, anticoagulation with warfarin, CAD, bioprosthetic MVR, gout, HTN, PPM, DVT/PE, DM.  Admitted after EMS called (friend didn't hear from patient in 3 days) and found pt with R side deficits.  In ED CT demonstrating L wholohemispheric SDH.  INR reversed with KCentra.  Initially treated non operatively however surveilance CT morena 3/22 showed the SDH was enlarging so taken to the OR.  Angiogram on 3/15 neg for vascular abnormalities.  On 3/25 taken to OR for L craniotomy and evacuation by Dr Hudson.  On 3/28 witnessed Sz, patient started on keppra then switched to Vimpat d/t thrombocytopenia.  Patient also started on dexamethasone, salt tabs and Florinef.  Patient had ongoing severe thrombocytopenia.  Hematology consulted.  Negative for HIT.  Received platelet transfusions for platelets less than 40.  Patient started on IVIG on 4/4.       Interval Problem Update  -No acute overnight events  - Received plt transfusion yesterday with improvement in plt 42-->60-->66  - Reports feeling better today, more strength, working with PT/OT, refusing SNF/rehab at this time, discussed the risks of going home by himself including falls and death  - discussing with neurosurgery/cardiology/hematology regarding anticoagulation moving forward  - neurosurgery ordered repeat CT head to evaluate SDH  - Na 131    Consultants/Specialty  Neurosurgery  Hematology  Cardiology    Code Status  Full Code    Disposition  SNF/rehab vs     Review of Systems  Review of Systems   Constitutional: Negative for chills and fever.   HENT: Negative for nosebleeds and sore throat.    Eyes: Negative for blurred vision and double vision.   Respiratory: Negative for cough and shortness of breath.    Cardiovascular: Negative for chest pain, palpitations and leg swelling.    Gastrointestinal: Negative for abdominal pain, diarrhea, nausea and vomiting.   Genitourinary: Negative for dysuria and urgency.   Musculoskeletal: Negative for back pain.   Skin: Negative for rash.   Neurological: Positive for weakness and headaches. Negative for dizziness and loss of consciousness.        Physical Exam  Temp:  [36.1 °C (97 °F)-36.9 °C (98.5 °F)] 36.2 °C (97.1 °F)  Pulse:  [78-91] 78  Resp:  [16-17] 16  BP: (107-141)/(81-98) 141/98  SpO2:  [96 %-99 %] 99 %    Physical Exam  Vitals reviewed.   Constitutional:       General: He is not in acute distress.     Appearance: Normal appearance. He is well-developed. He is not diaphoretic.   HENT:      Head: Normocephalic and atraumatic.      Comments: L scalp incision noted.  No signs of infection     Nose: Nose normal.      Mouth/Throat:      Mouth: Mucous membranes are moist.   Eyes:      General: No scleral icterus.     Extraocular Movements: Extraocular movements intact.      Conjunctiva/sclera: Conjunctivae normal.      Pupils: Pupils are equal, round, and reactive to light.   Neck:      Vascular: No JVD.   Cardiovascular:      Rate and Rhythm: Normal rate and regular rhythm.      Heart sounds: No murmur. No gallop.    Pulmonary:      Effort: Pulmonary effort is normal. No respiratory distress.      Breath sounds: No stridor. No wheezing or rales.   Abdominal:      General: Bowel sounds are normal. There is no distension.      Palpations: Abdomen is soft.      Tenderness: There is no abdominal tenderness. There is no guarding or rebound.   Musculoskeletal:         General: No tenderness.      Cervical back: Normal range of motion.      Right lower leg: No edema.      Left lower leg: No edema.   Skin:     General: Skin is warm and dry.      Findings: Bruising (scattered on extremities) present. No rash.   Neurological:      General: No focal deficit present.      Mental Status: He is alert and oriented to person, place, and time.      Sensory: No  sensory deficit.      Motor: No weakness.   Psychiatric:         Mood and Affect: Mood normal.         Behavior: Behavior normal.         Thought Content: Thought content normal.         Fluids    Intake/Output Summary (Last 24 hours) at 4/8/2021 1514  Last data filed at 4/8/2021 0000  Gross per 24 hour   Intake 427.5 ml   Output 650 ml   Net -222.5 ml       Laboratory  Recent Labs     04/06/21  0400 04/06/21  0400 04/07/21  0400 04/07/21  1903 04/08/21  0151   WBC 3.8*  --  3.6*  --  4.7*   RBC 3.45*  --  3.35*  --  3.51*   HEMOGLOBIN 11.4*  --  11.2*  --  11.4*   HEMATOCRIT 34.8*  --  32.4*  --  35.0*   .9*  --  96.7  --  99.7*   MCH 33.0  --  33.4*  --  32.5   MCHC 32.8*  --  34.6  --  32.6*   RDW 61.2*  --  58.3*  --  60.6*   PLATELETCT 44*   < > 42* 60* 66*   MPV 12.4  --  10.7  --  11.9    < > = values in this interval not displayed.     Recent Labs     04/06/21 0400 04/07/21  0400 04/08/21  0151   SODIUM 133* 130* 131*   POTASSIUM 4.5 4.3 4.4   CHLORIDE 100 100 101   CO2 25 25 24   GLUCOSE 154* 171* 138*   BUN 22 20 25*   CREATININE 0.79 0.72 0.76   CALCIUM 8.4* 8.3* 8.3*                   Imaging  CT-HEAD W/O   Final Result      Slight interval decrease in size and density of extra-axial hemorrhage deep to craniotomy flap.      Decreased attenuation along the inferior aspect left frontal lobe could represent encephalomalacia or low attenuation subdural fluid.      No new hemorrhage.      CT-HEAD W/O   Final Result      No significant interval change from one day prior.      CT-HEAD W/O   Final Result      1.  There is overall stable residual left frontal subdural and subarachnoid hemorrhage.   2.  There is left sided hemispheric edema with 3 mm of left-to-right midline shift, previously 4 mm.   3.  There is no new hemorrhage.   4.  There has been slight interval resorption of pneumocephalus with otherwise stable left craniotomy changes.   5.  Focal hypodensity in the inferior left frontal lobe again  seen most consistent with an area of evolving ischemia.   6.  Underlying atrophy and white matter disease again noted.      CT-HEAD W/O   Final Result      1.  Stable sequela of left craniotomy, with stable left-sided subarachnoid and subdural hemorrhage.   2.  Residual 4 mm left-to-right midline shift.   3.  No CT evidence of new hemorrhage or large vascular territory infarct.      CT-HEAD W/O   Final Result      Interval left craniotomy with overall decrease in volume of subdural hematoma with some postsurgical extra-axial hemorrhage deep to the craniotomy flap with associated subarachnoid hemorrhage.      Persistent mass effect and midline shift from left-to-right of approximately 5 mm.      CT-HEAD W/O   Final Result      Left extra-axial hematoma is again noted. Subacute blood closer to the vertex along the left frontoparietal lobes is mildly increased compared to prior measuring 1.2 compared to 1 cm.      Mass effect is again noted with 4.6 mm midline shift to the right, slightly increased compared to prior.      CT-ABDOMEN-PELVIS WITH   Final Result      1.  Ascites is no longer identified.      2.  Liver surface is mildly nodular consistent with cirrhosis.      3.  Splenomegaly is again noted. Punctate focus of decreased attenuation in the spleen could be due to normal differential enhancement although small hemangioma is also possible.      4.  Ventral abdominal wall hernia containing abdominal fat is again identified.      5.  Consolidation and volume loss is noted posteriorly in the right lower lobe which could indicate pneumonia.      6.  Diverticulosis.         DX-CHEST-PORTABLE (1 VIEW)   Final Result      Small right pleural effusion.      Right basilar opacities may represent atelectasis or scarring.         CT-HEAD W/O   Final Result      1.  Stable size of the extra-axial hemorrhage overlying the left cerebral hemisphere with sulci effacement and approximately 3 mm of left-to-right midline shift.          IR-CAROTID-CEREBRAL BILATERAL   Final Result         1.  Normal cerebral arteriogram.      2.  No evidence of arteriovenous malformation or intracranial aneurysm formation.      3.  No evidence of atherosclerotic disease in the right common femoral artery.      CT-CTA HEAD WITH & W/O-POST PROCESS   Final Result      No evidence of occlusive lesion or aneurysm.      Multiple tortuous small vessels along the anterior-inferior aspect of the left temporal lobe could represent some type of vascular malformation.      No significant interval change in left-sided holohemispheric subdural hematoma extending along the falx and layering over the tentorium.      CT-CTA NECK WITH & W/O-POST PROCESSING   Final Result      CT angiogram of the neck within normal limits.      CT-HEAD W/O   Final Result         1. Grossly unchanged left hemispheric subdural hematoma, most in the left frontal and temporal region.   2. No new intracranial hemorrhage.                  CT-HEAD W/O   Final Result      1.  Stable large LEFT hemispheric subdural hematoma with associated mass effect and midline shift.   2.  No significant change from prior exam obtained 2 hours earlier.      CT-HEAD W/O   Final Result      Large LEFT holohemispheric subdural hematoma with associated mass effect and 4 mm LEFT to RIGHT midline shift.      These findings were discussed with EFREM FITZGERALD on 3/12/2021 11:40 AM.      DX-CHEST-PORTABLE (1 VIEW)   Final Result      1.  Hypoinflation, improved from prior.   2.  Minimal RIGHT lung base atelectasis or scar.   3.  Minimal RIGHT pleural effusion.           Assessment/Plan  * Thrombocytopenia (HCC)  Assessment & Plan  Unsure etiology  HIT negative  Meds? Keppra vs allopurinol?  Heme consulted: s/lp IVIG and high dose steroids without significant improvement, unlikely ITP. RF +, lupus anticoagulant negative  - last plt transfusion 4/7, with improvement in plt 42-->60 (an hour after transfusion) -->66 next  morning         Subdural hematoma, acute (HCC)  Assessment & Plan  Continues to have headache  Neurosurgery following and recommending continuing steroids and Keppra for seizure prophylaxis. On steroid taper  Repeat CT scan 3/22: 1.2cm extra axial hematoma (from 1cm) with 4.6mm midline shift to right slightly greater compared to prior imaging  Neurosurgery following  PT/OT/SLP following, continue therapy    S/p craniotomy 3/25  Cont Neuro checks  BP control  Sz prophylaxis  PT/OT/SLP    3/30: As per neurosurgery, patient able to be discharged to skilled nursing facility when ready.  Staples and sutures out 2 weeks postop, follow-up with [facility] neurosurgery group in 4 weeks with a new head CT as per neurosurgery note, their office will arrange it.  Neurosurgery has also increased salt tablets to 2 g 3 times daily with Florinef.    3/31: Patient seems more lethargic this morning.  Neurosurgery repeated CT scan which did not reveal acute abnormality.  Neurosurgery would like to have an MRI of the brain, patient with pacemaker we will inquire if it is compatible with MRI machine.  Neurosurgery also recommends starting the patient on Decadron 4 mg every 6 hours for now.    4/1: Patient seems to be more awake today.  Unable to do MRI due to pacemaker.  We have switched Keppra to Vimpat due to Keppra possibly causing thrombocytopenia.  I talked to Dr. Aranda from neurology and he recommended to start Vimpat at 100 mg twice daily.    4/2: We have started the tapering of Decadron as per neurosurgery's recommendation.      History of prosthetic mitral valve  Assessment & Plan  With paroxysmal atrial fibrillation  Anticoagulation reversed given subdural hematoma  Resume anticoagulation when bleeding risk felt to be acceptable by neurosurgery and hematology due to ongoing thrombocytopenia.    COPD (chronic obstructive pulmonary disease) (HCC)- (present on admission)  Assessment & Plan  Patient not in acute exacerbation or  requiring oxygen  O2/RT protocols    Gout- (present on admission)  Assessment & Plan  Due to ongoing thrombocytopenia and as per hematology's recommendation we will hold allopurinol for now.  Patient currently receiving steroids.    Type 2 diabetes mellitus without complication, without long-term current use of insulin (HCC)- (present on admission)  Assessment & Plan  Patient with hyperglycemia worsened by steroids  A1C 5.9  lantus 15 units  SSI  Well controled on current regimen however will likely need to cut back lantus as decadron is tapered off    3/30: We will stop lantus, and continue with ISS. We will monitor and make changes accordingly. Hypoglycemia protocol in place.    3/31: As per neurosurgery we have started the patient on Decadron 4 mg every 6 hours, we will monitor glucose closely if needed we will restart Lantus.    4/5: Patient currently on decadron tapering. We will continue ISS and hypoglycemia protocol for now. Monitor and make changes accordingly.        Atrial fibrillation (HCC)  Assessment & Plan  Holding anticoagulation in setting of acute SDH  Rate control with metoprolol  Cont Tele monitoring    3/31: As per neurosurgery patient to resume anticoagulation in 2 weeks, unless he requires new neurosurgical intervention.      Pancytopenia (HCC)  Assessment & Plan  Has had normocytic anemia since 2019  Plt and WBC dropped during this admission  Not neutropenic  CTM      Seizure (HCC)  Assessment & Plan  Secondary to SDH  Witnessed event: pt started with aphasia then gaze went upward and fixed with head tremor.  Pt was then unresponsive.  Episode lasting approx 1 min with rapid return to baseline   2 Szs on 3/28 none since  Cont Keppra 1g BID    4/1: Due to ongoing thrombocytopenia as per hematology Keppra can sometimes cause low platelets, so we have switched to Vimpat as per neurology's recommendation.    Cognitive deficits  Assessment & Plan  Evaluated by SLP and found to have cognitive  deficits, recommending SNF  Cognitive deficits likely secondary to SDH  Discussed with friend, he is not at previous baseline    3/23 Will likely need SNF placement, order placed  CM to assist  Accepted by HH  Noncompliance with pt/ot  Encourage activity    4/5: Pending placement.    4/8: refusing SNF, will have PT/OT/ST re-evaluate    Alcoholic cirrhosis of liver without ascites (HCC)  Assessment & Plan  Patient reports history of heavy alcohol use, has not drank in few years  Will need GI follow up as outpatient  Denies previous diagnosis of liver disease  Cirrhotic appearing liver on imaging without ascites  Hep panel neg  Ammonia 26    4/5: Mild elevation of ALT, in the setting of liver disease. Patient currently not complaining of abdominal pain. If it continues to trend upwards or develops new abdominal pain we will consider re-imaging.    Venous stasis dermatitis of right lower extremity  Assessment & Plan  Does not appear infected on examination  Continue skin care per nursing protocol    Grade IV hemorrhoids  Assessment & Plan  Patient reports chronic hemorrhoids with occasional mild rectal bleeding  lidocaine gel for pain relief        Hyponatremia  Assessment & Plan  Asymptomatic hyponatremia  Hyponatremia worsening 3/22 now down to 126  3/23 stable at 129, cont current management  Likely SIADH  Free water restriction  Due to subdural hematoma will start salt supplementation    4/5: Continue salt tablets and florinef as per neurosurgery recommendations.    Valvular cardiomyopathy (HCC)- (present on admission)  Assessment & Plan  History of bioprosthetic mitral valve replacement    Hypertension  Assessment & Plan  Patient on metoprolol and lisinopril  PRN medications  Monitor and make changes accordingly    Pacemaker  Assessment & Plan  History of pacemaker limiting ability to obtain MRI    Obesity (BMI 30-39.9)- (present on admission)  Assessment & Plan  Will need weight loss program on discharge  Patient  counseled on lifestyle modifications  Will need close follow up as outpatient.    Benign prostatic hyperplasia without lower urinary tract symptoms- (present on admission)  Assessment & Plan  Continue Flomax  No evidence of urinary obstruction       VTE prophylaxis: SCDs    I certify that the patient requires continued medically necessary hospital services for the treatment of SDH and will remain in the hospital until neurosurgery feels patient is safe for discharge,.  Discharge plan is anticipated to be to SNF.

## 2021-04-08 NOTE — FACE TO FACE
Face to Face Supporting Documentation - Home Health    The encounter with this patient was in whole or in part the primary reason for home health admission.    Date of encounter:   Patient:                    MRN:                       YOB: 2022  Morales Olivier  [id number]  1955     Home health to see patient for:  Skilled Nursing care for assessment, interventions & education, Wound Care, Home health aide, Physical Therapy evaluation and treatment, Occupational therapy evaluation and treatment and Speech Language Pathology evaluation and treatment    Skilled need for:  Surgical Aftercare craniotomy    Skilled nursing interventions to include:  Wound Care and Comment: med mgmt    Homebound status evidenced by:  Need the aid of supportive devices such as crutches, canes, wheelchairs or walkers or Needs the assistance of another person in order to leave the home. Leaving home requires a considerable and taxing effort. There is a normal inability to leave the home.    Community Physician to provide follow up care: Yelena Haney P.A.-C.     Optional Interventions? No      I certify the face to face encounter for this home health care referral meets the CMS requirements and the encounter/clinical assessment with the patient was, in whole, or in part, for the medical condition(s) listed above, which is the primary reason for home health care. Based on my clinical findings: the service(s) are medically necessary, support the need for home health care, and the homebound criteria are met.  I certify that this patient has had a face to face encounter by myself.  Linda Benavides M.D. - NPI: [id number]

## 2021-04-08 NOTE — CONSULTS
Consults   Cardiology Initial Consultation    Date of Service  4/8/2021    Referring Physician  Linda Benavides M.D.    Reason for Consultation  Thrombocytopenia, chronic anticoagulation, chronic afib    History of Presenting Illness  Morales Olivier is a 65 y.o. male admitted 3/12/2021 with acute encephalopathy.    Admitted after EMS called (friend didn't hear from patient in 3 days) and found pt with R side deficits.  In ED CT demonstrating L wholohemispheric SDH.  INR reversed with KCentra (INR 2.84).  Initially treated non operatively however surveilance CT morena 3/22 showed the SDH was enlarging so taken to the OR.  Angiogram on 3/15 neg for vascular abnormalities.  On 3/25 taken to OR for L craniotomy and evacuation by Dr Hudson.  On 3/28 witnessed Sz, patient started on keppra then switched to Vimpat d/t thrombocytopenia.  Patient also started on dexamethasone, salt tabs and Florinef.  Patient had ongoing severe thrombocytopenia.  Hematology consulted.  Negative for HIT.  Received platelet transfusions for platelets less than 40.  Patient started on IVIG on 4/4.     Has chronic A. fib on Coumadin, sick sinus syndrome status post pacemaker, mitral valve replacement with redo March 2017 (severe MS of prosthetic valve), hypertension, DVT, and diabetes with recent lower extremity cellulitis, ETOH.     Recent admission Feb 2021 for sepsis and A. fib RVR with volume overload, required intubation.    Patient currently lying in bed.  Denies chest pain, shortness of breath, lightheadedness, palpitations, near syncope.        Review of Systems  Review of Systems    All systems reviewed and negative.    Past Medical History   has a past medical history of Atrial fibrillation (HCC), Back pain, Bronchitis, CAD (coronary artery disease), COPD (chronic obstructive pulmonary disease) (HCC), Dehydration (10/22/2019), Gout, Heart valve disease, Hypertension, Kidney stone, Obesity, Open wound (9/2/2009), Pacemaker, Personal  history of venous thrombosis and embolism (2009), PVC (premature ventricular contraction) (4/13/2019), Renal disorder, and Type II or unspecified type diabetes mellitus without mention of complication, not stated as uncontrolled.    Surgical History   has a past surgical history that includes mitral valve replace (3/14/08); maze procedure (3/14/08); angiogram (7/3/2009); angiogram (7/4/2009); cath removal (7/4/2009); thrombectomy (7/4/2009); embolectomy (7/4/2009); irrigation & debridement general (7/20/2009); wide excision (7/20/2009); other cardiac surgery (2008); other abdominal surgery; mitral valve replace (3/8/2017); lopez (3/8/2017); aortic valve replacement; and craniotomy (Left, 3/25/2021).    Family History  family history includes Diabetes in his mother; Lung Disease in his father.      Social History   reports that he quit smoking about 40 years ago. His smoking use included cigarettes. He has a 22.50 pack-year smoking history. He has never used smokeless tobacco. He reports current drug use. Drug: Marijuana. He reports that he does not drink alcohol.    Medications  Prior to Admission Medications   Prescriptions Last Dose Informant Patient Reported? Taking?   Pain Pump (PATIENT SUPPLIED) XX SERGEY   Yes Yes   Sig: Inject  as directed continuous.   allopurinol (ZYLOPRIM) 100 MG Tab UNK at [facility] Patient's Home Pharmacy Yes No   Sig: Take 100 mg by mouth every morning.   benzonatate (TESSALON) 100 MG Cap NOT TAKING at NOT TAKING Patient's Home Pharmacy No No   Sig: Take 1 Cap by mouth 3 times a day as needed for Cough.   Patient not taking: Reported on 2/4/2021   cetirizine (ZYRTEC) 10 MG Tab UNK at [facility] Patient's Home Pharmacy Yes No   Sig: Take 10 mg by mouth every morning.   hydrOXYzine HCl (ATARAX) 25 MG Tab UNK at [facility] Patient's Home Pharmacy No No   Sig: Take 1 Tab by mouth 3 times a day as needed for Itching.   lisinopril (PRINIVIL) 2.5 MG Tab UNK at [facility] Patient's Home Pharmacy Yes No   Sig: Take 2.5 mg by  mouth every evening.   metFORMIN (GLUCOPHAGE) 500 MG Tab UNK at [facility] Patient's Home Pharmacy Yes No   Sig: Take 500 mg by mouth 2 times a day, with meals.   metoprolol SR (TOPROL XL) 50 MG TABLET SR 24 HR UNK at [facility] Patient's Home Pharmacy Yes No   Sig: Take 50 mg by mouth every morning.   multivitamin (THERAGRAN) Tab UNK at [facility] Patient's Home Pharmacy No No   Sig: Take 1 tablet by mouth every day.   tamsulosin (FLOMAX) 0.4 MG capsule UNK at [facility] Patient's Home Pharmacy No No   Sig: TAKE 1 CAPSULE BY MOUTH EVERY DAY   warfarin (COUMADIN) 3 MG Tab UNK at [facility] Patient's Home Pharmacy No No   Sig: Take 0.5 Tablets by mouth every day at 6 PM for 30 days.      Facility-Administered Medications: None       Allergies  No Active Allergies    Vital signs in last 24 hours  Temp:  [36.1 °C (97 °F)-36.9 °C (98.5 °F)] 36.2 °C (97.1 °F)  Pulse:  [78-91] 78  Resp:  [16-17] 16  BP: (107-141)/(81-98) 141/98  SpO2:  [96 %-99 %] 99 %    Physical Exam  Physical Exam      General: No acute distress. Well nourished.  HEENT: EOM grossly intact, no scleral icterus, no pharyngeal erythema.  Craniotomy scar left head  Neck:  No JVD, no bruits, trachea midline  CVS: RRR. Normal S1, S2. No M/R/G. No LE edema.  2+ radial pulses, 2+ PT pulses  Resp: CTAB. No wheezing or crackles/rhonchi. Normal respiratory effort.  Abdomen: Soft, NT, no isabel hepatomegaly.  MSK/Ext: No clubbing or cyanosis.  Skin: Warm and dry, no rashes.  Neurological: CN III-XII grossly intact. No focal deficits.   Psych: A&O x 3, appropriate affect, good judgement      Lab Review  Lab Results   Component Value Date/Time    WBC 4.7 (L) 04/08/2021 01:51 AM    RBC 3.51 (L) 04/08/2021 01:51 AM    HEMOGLOBIN 11.4 (L) 04/08/2021 01:51 AM    HEMATOCRIT 35.0 (L) 04/08/2021 01:51 AM    MCV 99.7 (H) 04/08/2021 01:51 AM    MCH 32.5 04/08/2021 01:51 AM    MCHC 32.6 (L) 04/08/2021 01:51 AM    MPV 11.9 04/08/2021 01:51 AM      Lab Results   Component Value Date/Time    SODIUM 131 (L)  04/08/2021 01:51 AM    POTASSIUM 4.4 04/08/2021 01:51 AM    CHLORIDE 101 04/08/2021 01:51 AM    CO2 24 04/08/2021 01:51 AM    GLUCOSE 138 (H) 04/08/2021 01:51 AM    BUN 25 (H) 04/08/2021 01:51 AM    CREATININE 0.76 04/08/2021 01:51 AM    CREATININE 1.4 04/27/2009 10:08 AM      Lab Results   Component Value Date/Time    ASTSGOT 32 04/06/2021 04:00 AM    ALTSGPT 119 (H) 04/06/2021 04:00 AM     Lab Results   Component Value Date/Time    CHOLSTRLTOT 125 03/12/2021 04:55 PM    LDL 77 03/12/2021 04:55 PM    HDL 27 (A) 03/12/2021 04:55 PM    TRIGLYCERIDE 104 03/12/2021 04:55 PM    TROPONINT 20 (H) 02/04/2021 04:15 PM       No results for input(s): NTPROBNP in the last 72 hours.    Cardiac Imaging and Procedures Review  EKG:  My personal interpretation of the EKG dated 3-12-21 is Afib, V pacing    Echocardiogram:  2-4-21  Mildly depressed left ventricular function.  Left ventricular ejection   fraction is visually estimated to be 45%, likely affected by rapid   heart rate.   Endocardial borders not well visualized to accurate assess wall motion   abnormalities. Recommend repeating limited study with contrast.  The right ventricle was grossly normal in size and function.  Severely dilated left atrium.  Known mitral valve bioprosthesis which is functioning normally,   transvalvular gradient is 6.5 mmHg @.  Moderate tricuspid regurgitation.   Pleural effusion noted.  Compared to the study done on 11/20/2020, LVEF is measured lower,   likely affected by rapid heart rate. Otherwise, no significant changes.    KD 2/6/2021  No evidence of endocarditis.  Mildly reduced left ventricular systolic function.  Left ventricular ejection fraction is visually estimated to be 45%.  Normal right ventricular systolic function.  The left atrium is surgically absent.  Known mitral valve bioprosthesis which is functioning normally with   appropriate transvalvular gradient.  Structurally normal tricuspid valve without significant stenosis    Moderate tricuspid regurgitation.  Estimated right ventricular systolic pressure  is 35 mmHg.     Venous duplex  2/5/2021  No superficial or deep venous thrombosis.     MPI   11/21/2020  There is mild global hypokinesis.   No evidence of infarct or reversible ischemia.   ECG INTERPRETATION   Nondiagnostic ECG portion of a Regadenoson nuclear stress test.    /[facility] 2017  RIGHT HEART PRESSURES:  Mean right atrial pressure 15 mmHg.  Right ventricular pressure 46/12.  Pulmonary artery pressure 50/33, mean of 30 mmHg.  LEFT HEART PRESSURES: LVEDP of 16 mmHg.  Left ventricular systolic pressure of 144.  Central aortic pressure systolic 132, diastolic 101, mean 118 mmHg.    MITRAL VALVE MEASUREMENTS:  End-diastolic pressure gradient average 14 mmHg.  Mitral valve mean gradient:  18 mmHg.  Mitral valve area of 0.86 square cm; using thermodilution cardiac output.     CARDIAC OUTPUT:  1.  Thermodilution cardiac output 3.3 liters per minute.  Cardiac index 1.4   liters per minute per meter squared.  2.  Ike method:  Cardiac output 5.1 liters per minute, cardiac index 2.2   liters per minute.     No sign CAD    CONCLUSION:  1.  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  2.  Pulmonary hypertension.  Moderate.  3.  EF 47%.  4.  Patent coronary arteries.    Open heart surgery 3-8-17  Redo mitral valve replacement (29 mm Medtronic Mosaic porcine valve) and intraoperative transesophageal echocardiography.     Imaging  CT-HEAD W/O   Final Result      Slight interval decrease in size and density of extra-axial hemorrhage deep to craniotomy flap.      Decreased attenuation along the inferior aspect left frontal lobe could represent encephalomalacia or low attenuation subdural fluid.      No new hemorrhage.      CT-HEAD W/O   Final Result      No significant interval change from one day prior.      CT-HEAD W/O   Final Result      1.  There is overall stable residual left frontal subdural and subarachnoid hemorrhage.    2.  There is left sided hemispheric edema with 3 mm of left-to-right midline shift, previously 4 mm.   3.  There is no new hemorrhage.   4.  There has been slight interval resorption of pneumocephalus with otherwise stable left craniotomy changes.   5.  Focal hypodensity in the inferior left frontal lobe again seen most consistent with an area of evolving ischemia.   6.  Underlying atrophy and white matter disease again noted.      CT-HEAD W/O   Final Result      1.  Stable sequela of left craniotomy, with stable left-sided subarachnoid and subdural hemorrhage.   2.  Residual 4 mm left-to-right midline shift.   3.  No CT evidence of new hemorrhage or large vascular territory infarct.      CT-HEAD W/O   Final Result      Interval left craniotomy with overall decrease in volume of subdural hematoma with some postsurgical extra-axial hemorrhage deep to the craniotomy flap with associated subarachnoid hemorrhage.      Persistent mass effect and midline shift from left-to-right of approximately 5 mm.      CT-HEAD W/O   Final Result      Left extra-axial hematoma is again noted. Subacute blood closer to the vertex along the left frontoparietal lobes is mildly increased compared to prior measuring 1.2 compared to 1 cm.      Mass effect is again noted with 4.6 mm midline shift to the right, slightly increased compared to prior.      CT-ABDOMEN-PELVIS WITH   Final Result      1.  Ascites is no longer identified.      2.  Liver surface is mildly nodular consistent with cirrhosis.      3.  Splenomegaly is again noted. Punctate focus of decreased attenuation in the spleen could be due to normal differential enhancement although small hemangioma is also possible.      4.  Ventral abdominal wall hernia containing abdominal fat is again identified.      5.  Consolidation and volume loss is noted posteriorly in the right lower lobe which could indicate pneumonia.      6.  Diverticulosis.         DX-CHEST-PORTABLE (1 VIEW)   Final  Result      Small right pleural effusion.      Right basilar opacities may represent atelectasis or scarring.         CT-HEAD W/O   Final Result      1.  Stable size of the extra-axial hemorrhage overlying the left cerebral hemisphere with sulci effacement and approximately 3 mm of left-to-right midline shift.         IR-CAROTID-CEREBRAL BILATERAL   Final Result         1.  Normal cerebral arteriogram.      2.  No evidence of arteriovenous malformation or intracranial aneurysm formation.      3.  No evidence of atherosclerotic disease in the right common femoral artery.      CT-CTA HEAD WITH & W/O-POST PROCESS   Final Result      No evidence of occlusive lesion or aneurysm.      Multiple tortuous small vessels along the anterior-inferior aspect of the left temporal lobe could represent some type of vascular malformation.      No significant interval change in left-sided holohemispheric subdural hematoma extending along the falx and layering over the tentorium.      CT-CTA NECK WITH & W/O-POST PROCESSING   Final Result      CT angiogram of the neck within normal limits.      CT-HEAD W/O   Final Result         1. Grossly unchanged left hemispheric subdural hematoma, most in the left frontal and temporal region.   2. No new intracranial hemorrhage.                  CT-HEAD W/O   Final Result      1.  Stable large LEFT hemispheric subdural hematoma with associated mass effect and midline shift.   2.  No significant change from prior exam obtained 2 hours earlier.      CT-HEAD W/O   Final Result      Large LEFT holohemispheric subdural hematoma with associated mass effect and 4 mm LEFT to RIGHT midline shift.      These findings were discussed with EFREM FITZGERALD on 3/12/2021 11:40 AM.      DX-CHEST-PORTABLE (1 VIEW)   Final Result      1.  Hypoinflation, improved from prior.   2.  Minimal RIGHT lung base atelectasis or scar.   3.  Minimal RIGHT pleural effusion.            Assessment/Plan  -SDH, large, spontaneous, INR  was only 2.84  -Chronic afib, CHADS2 vasc of 4, no TIA/CVA  -Chronic anticoagulation  -Thrombocytopenia  -Bioprosthetic MV  -COPD  -DM2  -ETOH  -Acute encephalopathy  -Mild LV dysfunction  -Sz  -Venous stasis  -Hyponatremia  -HTN  -PM  -BMI 30      Plan:  -Complicated case  -It bothers me that we do not know why he was fell and had such a massive bleed with a normal INR  -Related to his bioprosthetic mitral valve, aspirin is sufficient  -He is on anticoagulation for chronic atrial fibrillation for chads 2 vascular score of 2, annual risk of stroke is 4% meaning annual risk of not having a stroke is 96%  -At this time it is reasonable to go without anticoagulation  -We can evaluate him in the office for possible Watchman device  -I have a message out to discuss with Dr. Naveed Hudson but I think the better part of valor at this time is to avoid anticoagulation  -Cardiology will sign off for now, I will arrange outpatient follow-up, please reach out to me for questions or concerns.    Discussed with Dr. Benavides      Thank you for allowing me to participate in the care of this patient.    Please contact me with any questions.    Albania Cummings M.D.   Cardiologist, [facility]  [phone]

## 2021-04-08 NOTE — ASSESSMENT & PLAN NOTE
Has had normocytic anemia since 2019  Plt and WBC dropped during this admission  Likely d/t liver disease  Not neutropenic  CTM

## 2021-04-08 NOTE — PROGRESS NOTES
Oncology/Hematology Progress Note               Author: Ventura Lange M.D.    Cc: Thrombocytopenia Date & Time created: 4/8/2021  12:05 PM     Interval History:    -patient seen this am getting face shaven, no acute distress. Just finished lunch.  -overnight transfused 1 unit platelets with post transfusion draw showing plt increasing from 42 -> 60 -> 62 in am  -patient asked about getting covid vaccine second shot since he got the first shot in January.  From hemonc perspective no contraindications for getting vaccine  -have communicated with primary team importance of conferring with neurosurgery and cardiology urgency of anticoagulation      PMHx, PSurgHx, SocHx, FAMHx:  All reviewed and no changes    Review of Systems:  Review of Systems   Constitutional: Positive for malaise/fatigue. Negative for chills and fever.   HENT: Negative for congestion, nosebleeds, sinus pain and sore throat.    Eyes: Negative for blurred vision, pain, discharge and redness.   Respiratory: Negative for cough, sputum production and shortness of breath.    Cardiovascular: Negative for chest pain, palpitations and leg swelling.   Gastrointestinal: Negative for abdominal pain, diarrhea, heartburn, nausea and vomiting.   Genitourinary: Negative for dysuria, frequency, hematuria and urgency.   Musculoskeletal: Positive for back pain and joint pain.   Skin: Negative for itching and rash.   Neurological: Positive for weakness and headaches.       Physical Exam:  Physical Exam  Vitals reviewed.   Constitutional:       General: He is not in acute distress.     Appearance: Normal appearance. He is not toxic-appearing.   HENT:      Head: Normocephalic.      Comments: Prior left craniotomy surgical changes     Mouth/Throat:      Mouth: Mucous membranes are moist.      Pharynx: Oropharynx is clear. No oropharyngeal exudate.   Eyes:      General: No scleral icterus.        Right eye: No discharge.         Left eye: No discharge.      Extraocular  Movements: Extraocular movements intact.   Cardiovascular:      Rate and Rhythm: Normal rate and regular rhythm.      Heart sounds: No murmur. No gallop.    Pulmonary:      Effort: Pulmonary effort is normal. No respiratory distress.      Breath sounds: Normal breath sounds. No wheezing, rhonchi or rales.   Abdominal:      General: Abdomen is flat. Bowel sounds are normal. There is no distension.      Palpations: Abdomen is soft.      Tenderness: There is no abdominal tenderness. There is no guarding.   Musculoskeletal:         General: No signs of injury. Normal range of motion.      Cervical back: Normal range of motion and neck supple. No tenderness.      Right lower leg: No edema.      Left lower leg: No edema.   Skin:     General: Skin is warm and dry.      Coloration: Skin is not jaundiced.      Findings: No rash.   Neurological:      General: No focal deficit present.      Mental Status: He is alert and oriented to person, place, and time.   Psychiatric:         Mood and Affect: Mood normal.         Thought Content: Thought content normal.         Judgment: Judgment normal.         Labs:          Recent Labs     04/06/21 0400 04/07/21 0400 04/08/21  0151   SODIUM 133* 130* 131*   POTASSIUM 4.5 4.3 4.4   CHLORIDE 100 100 101   CO2 25 25 24   BUN 22 20 25*   CREATININE 0.79 0.72 0.76   MAGNESIUM 1.9  --   --    CALCIUM 8.4* 8.3* 8.3*     Recent Labs     04/06/21 0400 04/07/21 0400 04/08/21  0151   ALTSGPT 119*  --   --    ASTSGOT 32  --   --    ALKPHOSPHAT 84  --   --    TBILIRUBIN 0.4  --   --    GLUCOSE 154* 171* 138*     Recent Labs     04/05/21  1455 04/05/21  1922 04/06/21  0400 04/06/21  0400 04/07/21  0400 04/07/21  1903 04/08/21  0151   RBC  --    < > 3.45*  --  3.35*  --  3.51*   HEMOGLOBIN  --    < > 11.4*  --  11.2*  --  11.4*   HEMATOCRIT  --    < > 34.8*  --  32.4*  --  35.0*   PLATELETCT  --    < > 44*   < > 42* 60* 66*   PROTHROMBTM 15.9*  --   --   --   --   --   --    APTT 22.8*  --   --    --   --   --   --    INR 1.23*  --   --   --   --   --   --     < > = values in this interval not displayed.     Recent Labs     21  0151   WBC 3.8* 3.6* 4.7*   NEUTSPOLYS 76.90* 77.10* 75.60*   LYMPHOCYTES 14.30* 14.30* 13.50*   MONOCYTES 6.90 7.20 8.60   EOSINOPHILS 0.00 0.00 0.20   BASOPHILS 0.30 0.00 0.20   ASTSGOT 32  --   --    ALTSGPT 119*  --   --    ALKPHOSPHAT 84  --   --    TBILIRUBIN 0.4  --   --      Recent Labs     21  0151   SODIUM 133* 130* 131*   POTASSIUM 4.5 4.3 4.4   CHLORIDE 100 100 101   CO2 25 25 24   GLUCOSE 154* 171* 138*   BUN 22 20 25*   CREATININE 0.79 0.72 0.76   CALCIUM 8.4* 8.3* 8.3*     Hemodynamics:  Temp (24hrs), Av.4 °C (97.5 °F), Min:36.1 °C (97 °F), Max:36.9 °C (98.5 °F)  Temperature: 36.2 °C (97.1 °F)  Pulse  Av.7  Min: 34  Max: 92   Blood Pressure : 141/98     Respiratory:    Respiration: 16, Pulse Oximetry: 99 %     Work Of Breathing / Effort: Within Normal Limits  RUL Breath Sounds: Clear, RML Breath Sounds: Clear, RLL Breath Sounds: Diminished, ROBERT Breath Sounds: Clear, LLL Breath Sounds: Diminished  Fluids:    Intake/Output Summary (Last 24 hours) at 2021 1258  Last data filed at 2021 0540  Gross per 24 hour   Intake 200 ml   Output 600 ml   Net -400 ml        GI/Nutrition:  Orders Placed This Encounter   Procedures   • Diet Order Diet: Level 7 - Easy to Chew; Liquid level: Level 0 - Thin; Second Modifier: (optional): Consistent CHO (Diabetic)     Standing Status:   Standing     Number of Occurrences:   1     Order Specific Question:   Diet:     Answer:   Level 7 - Easy to Chew [22]     Order Specific Question:   Liquid level     Answer:   Level 0 - Thin     Order Specific Question:   Second Modifier: (optional)     Answer:   Consistent CHO (Diabetic) [4]     Medical Decision Making, by Problem:  Active Hospital Problems    Diagnosis    • *Thrombocytopenia (HCC) [D69.6]    • Subdural  hematoma, acute (HCC) [S06.5X9A]    • History of prosthetic mitral valve [Z95.2]    • COPD (chronic obstructive pulmonary disease) (HCC) [J44.9]    • Gout [M10.9]    • Type 2 diabetes mellitus without complication, without long-term current use of insulin (HCC) [E11.9]    • Atrial fibrillation (HCC) [I48.91]    • Pacemaker [Z95.0]    • Benign prostatic hyperplasia without lower urinary tract symptoms [N40.0]    • Obesity (BMI 30-39.9) [E66.9]    • Seizure (HCC) [R56.9]    • Cognitive deficits [R41.89]    • Generalized abdominal pain [R10.84]    • Alcoholic cirrhosis of liver without ascites (HCC) [K70.30]    • Hyponatremia [E87.1]    • Grade IV hemorrhoids [K64.3]    • Venous stasis dermatitis of right lower extremity [I87.2]    • Valvular cardiomyopathy (HCC) [I42.8]        Plan:  1.  Thrombocytopenia--uncertain etiology.  He has some degree of liver cirrhosis, with a chronic platelet count of 110s to 130s.  There is no evidence of DIC with a fibrinogen of 212.  His testing for hepatitis B, hepatitis C, and HIV were negative.  Historically not consistent with TTP/HUS.  There was worry for HIT, with a positive platelet antibody, but he did have a low 4T score and negative [facility] testing essentially rules out HIT.         Not likely to be ITP.  He was on high-dose steroids with no improvement.  Would be unusual to see acute evolution of ITP while in the hospital as well.  He did get 1 dose of IVIG on 4/4 with no benefit.  He does respond to platelet transfusions, although his counts dropped down quickly again.        When he was admitted on 3/12 his platelets were 152.  For the first week they state around the 110s to 150s.  During the second week of his admission they dropped down.  This suggests something acute that happened in the hospital.  My suspicion is it was related to either the Keppra or the allopurinol--both of which were stopped on 4/1.  It may take some time for his counts to recover if it is medication  induced.  Sometimes this can take 2 to 4 weeks.    --B12, folate WNL  --SPEP/JORDAN pending  --SPENCER, ESR, lupus anticoagulant WNL.  RF mildy elevated at 18  --Transfuse if platelets go below 40 (or if any new bleeding, keep platelets above 50K)  --trial of platelet transfusion on 4/7 showed good platelet count rise--therefore this is not ITP or immune mediated thrombocytopenia      2.  SDH--management per medicine team and neurosurgery.  Has had no reaccumulation since his operative evacuation of the subdural.  --Medicine team following  --Neurosurgery following  --We need neurosurgery to weigh in on when he would be safe to restart anticoagulation, assuming that he had a normal platelet count.       3.  Mitral valve replacement/atrial fibrillation --he was previously on Coumadin.  All anticoagulation has been stopped, given his subdural hematoma.  --Hold anticoagulation until he is medically safe for this  --Currently high risk of bleeding with thrombocytopenia    --We need cardiology to weigh in on what his risk of clotting is with the valve replacement, off of anticoagulation.  In other words how high risk is he for being off of anticoagulation for a prolonged period of time.        --If his platelets were normal, I would like neurosurgery to weigh in on how long they would keep him off of anticoagulation.  Currently I am not sure if we were holding off on anticoagulation primarily because of the recent SDH evacuation, or if neurosurgery would have cleared him for anticoagulation by now except for his low platelets.  It would also be helpful to have cardiology weigh in on the risk of holding off of anticoagulation for a prolonged period of time.  This would help me to decide how aggressive I need to be with platelet transfusion.  If cardiology felt he was high risk for blood clots and wanted him on anticoagulation ASAP, and neurosurgery felt that if his platelets are greater than 50 he would be safe for  anticoagulation, then we could be more aggressive with transfusions to keep his platelets above 50 and allow him to resume anticoagulation.    Okay to get covid vaccine from hem/onc perspective (patient was asking about getting second shot)            Highly complex patient with a high risk of morbidity and mortality.  We will continue to monitor.    Quality-Core Measures   Reviewed items::  Labs reviewed and Medications reviewed  DVT prophylaxis pharmacological::  Contraindicated - High bleeding risk

## 2021-04-09 NOTE — THERAPY
Physical Therapy   Daily Treatment     Patient Name: Morales Olivier  Age:  65 y.o., Sex:  male  Medical Record #: [id number]  Today's Date: 4/9/2021     Precautions: Fall Risk, Swallow Precautions ( See Comments)    Assessment    Pt willing and eager to participate w/PT @ scheduled time. Improvement w/pts amb efforts from yesterdays efforts distance wise. Pt now @ CGA->supervised assist w/FWW. Still concerned regarding pts cognition. Per pts friend who has been assisting w/pts bills, pt is below his baseline cognition wise. The friend has noticed a change in his memory, recommend cog eval. Pt is making improvements w/his amb efforts, no assist needed w/bed mobility or sit->stands. Transfers min<->CGA.   Nrsg please ambulate pt Sat and Sun in prep for possible d/c home.     Plan    Continue current treatment plan.    DC Equipment Recommendations: Owns a FWW  Discharge Recommendations: Recommend post-acute placement for additional physical therapy services prior to discharge home       Objective       04/09/21 0956   Balance   Sitting Balance (Static) Good   Sitting Balance (Dynamic) Fair +   Standing Balance (Static) Fair -   Standing Balance (Dynamic) Fair -   Weight Shift Sitting Fair   Weight Shift Standing Fair   Comments standing w/FWW   Gait Analysis   Gait Level Of Assist   (CGA)   Assistive Device Front Wheel Walker   Distance (Feet) 150   # of Times Distance was Traveled 1   Skilled Intervention Verbal Cuing   Comments Pt conts to amb hallway distances w/FWW. Pt needing cueing to stand inside the walker, pt ambs w/forward flex'd posture. Pt stated this is how he walks w/his walker @ home to help his LBP.    Bed Mobility    Supine to Sit Supervised   Sit to Supine Supervised   Scooting Supervised   Rolling Supervised   Functional Mobility   Sit to Stand Supervised  (from EOB->FWW)   Bed, Chair, Wheelchair Transfer Minimal Assist  (w/FWW)   Skilled Intervention Verbal Cuing;Postural Facilitation;Compensatory  Strategies   Comments Improvement w/sit->stands to the FWW. Min assist still needed w/his transfers w/FWW.    How much difficulty does the patient currently have...   Turning over in bed (including adjusting bedclothes, sheets and blankets)? 3   Sitting down on and standing up from a chair with arms (e.g., wheelchair, bedside commode, etc.) 2   Moving from lying on back to sitting on the side of the bed? 3   How much help from another person does the patient currently need...   Moving to and from a bed to a chair (including a wheelchair)? 3   Need to walk in a hospital room? 3   Climbing 3-5 steps with a railing? 3   6 clicks Mobility Score 17   Short Term Goals    Short Term Goal # 2 Patient will move sitting<>standing with supervision within 6tx in order to initiate gait and transfers   Goal Outcome # 2 Goal not met

## 2021-04-09 NOTE — CARE PLAN
Problem: Knowledge Deficit  Goal: Knowledge of disease process/condition, treatment plan, diagnostic tests, and medications will improve  Outcome: PROGRESSING AS EXPECTED      Problem: Skin Integrity  Goal: Risk for impaired skin integrity will decrease  Outcome: PROGRESSING AS EXPECTED      Problem: Respiratory:  Goal: Respiratory status will improve  Outcome: PROGRESSING AS EXPECTED      Problem: Urinary Elimination:  Goal: Ability to reestablish a normal urinary elimination pattern will improve  Outcome: PROGRESSING AS EXPECTED

## 2021-04-09 NOTE — PROGRESS NOTES
[facility] Medicine Daily Progress Note    Date of Service  4/9/2021    Chief Complaint  65 y.o. male admitted 3/12/2021 with altered mentation.    Hospital Course  66yo PMHx ETOH, AFIb, anticoagulation with warfarin, CAD, bioprosthetic MVR, gout, HTN, PPM, DVT/PE, DM.  Admitted after EMS called (friend didn't hear from patient in 3 days) and found pt with R side deficits.  In ED CT demonstrating L wholohemispheric SDH.  INR reversed with KCentra.  Initially treated non operatively however surveilance CT morena 3/22 showed the SDH was enlarging so taken to the OR.  Angiogram on 3/15 neg for vascular abnormalities.  On 3/25 taken to OR for L craniotomy and evacuation by Dr Hudson.  On 3/28 witnessed Sz, patient started on keppra then switched to Vimpat d/t thrombocytopenia.  Patient also started on dexamethasone, salt tabs and Florinef.  Patient had ongoing severe thrombocytopenia.  Hematology consulted.  Negative for HIT.  Received platelet transfusions for platelets less than 40.  Patient started on IVIG on 4/4.       Interval Problem Update  -No acute overnight events  - Plt stable 66-->60  - complaining of back pain today, reports he does have chronic back pain, hurts near lumbar spine, given fall and low plt will get back imagine to r/o fracture/hematoma   - working with PT/OT, is improving but they still recommend post-acute placement, he's still refusing, does have capacity, understands risks, will try and get HH to accept patient, does have friend who helps him a lot and checks on him daily, will have speech perform cognitive eval  - cardiology did NOT think he needed AC for mitral valve, does have afib but given bleeding risk did not recommend restarting for stroke prevention, aspirin sufficient for valve    Consultants/Specialty  Neurosurgery  Hematology  Cardiology    Code Status  Full Code    Disposition  SNF/rehab vs HH    Review of Systems  Review of Systems   Constitutional: Negative for chills and fever.    HENT: Negative for nosebleeds and sore throat.    Eyes: Negative for blurred vision and double vision.   Respiratory: Negative for cough and shortness of breath.    Cardiovascular: Negative for chest pain, palpitations and leg swelling.   Gastrointestinal: Negative for abdominal pain, diarrhea, nausea and vomiting.   Genitourinary: Negative for dysuria and urgency.   Musculoskeletal: Positive for back pain.   Skin: Negative for rash.   Neurological: Positive for weakness and headaches. Negative for dizziness and loss of consciousness.        Physical Exam  Temp:  [35.9 °C (96.7 °F)-36.3 °C (97.3 °F)] 35.9 °C (96.7 °F)  Pulse:  [80-81] 81  Resp:  [18-20] 20  BP: (116-134)/(74-86) 117/74  SpO2:  [95 %-98 %] 98 %    Physical Exam  Vitals reviewed.   Constitutional:       General: He is not in acute distress.     Appearance: Normal appearance. He is well-developed. He is not diaphoretic.   HENT:      Head: Normocephalic and atraumatic.      Comments: L scalp incision noted.  No signs of infection     Nose: Nose normal.      Mouth/Throat:      Mouth: Mucous membranes are moist.   Eyes:      General: No scleral icterus.     Extraocular Movements: Extraocular movements intact.      Conjunctiva/sclera: Conjunctivae normal.      Pupils: Pupils are equal, round, and reactive to light.   Neck:      Vascular: No JVD.   Cardiovascular:      Rate and Rhythm: Normal rate and regular rhythm.      Heart sounds: No murmur. No gallop.    Pulmonary:      Effort: Pulmonary effort is normal. No respiratory distress.      Breath sounds: No stridor. No wheezing or rales.   Abdominal:      General: Bowel sounds are normal. There is no distension.      Palpations: Abdomen is soft.      Tenderness: There is no abdominal tenderness. There is no guarding or rebound.   Musculoskeletal:         General: Tenderness (Along lumbar spine) present.      Cervical back: Normal range of motion.      Right lower leg: No edema.      Left lower leg: No  edema.   Skin:     General: Skin is warm and dry.      Findings: Bruising (scattered on extremities) present. No rash.   Neurological:      General: No focal deficit present.      Mental Status: He is alert and oriented to person, place, and time.      Sensory: No sensory deficit.      Motor: No weakness.   Psychiatric:         Mood and Affect: Mood normal.         Behavior: Behavior normal.         Thought Content: Thought content normal.         Fluids  No intake or output data in the 24 hours ending 04/09/21 1524    Laboratory  Recent Labs     04/07/21  0400 04/07/21  0400 04/07/21  1903 04/08/21  0151 04/09/21  0153   WBC 3.6*  --   --  4.7* 4.7*   RBC 3.35*  --   --  3.51* 3.43*   HEMOGLOBIN 11.2*  --   --  11.4* 11.4*   HEMATOCRIT 32.4*  --   --  35.0* 34.0*   MCV 96.7  --   --  99.7* 99.1*   MCH 33.4*  --   --  32.5 33.2*   MCHC 34.6  --   --  32.6* 33.5*   RDW 58.3*  --   --  60.6* 60.1*   PLATELETCT 42*   < > 60* 66* 60*   MPV 10.7  --   --  11.9 11.0    < > = values in this interval not displayed.     Recent Labs     04/07/21  0400 04/08/21  0151 04/09/21  0153   SODIUM 130* 131* 132*   POTASSIUM 4.3 4.4 4.2   CHLORIDE 100 101 101   CO2 25 24 25   GLUCOSE 171* 138* 177*   BUN 20 25* 28*   CREATININE 0.72 0.76 0.80   CALCIUM 8.3* 8.3* 8.0*                   Imaging  CT-HEAD W/O   Final Result      Slight interval decrease in size and density of extra-axial hemorrhage deep to craniotomy flap.      Decreased attenuation along the inferior aspect left frontal lobe could represent encephalomalacia or low attenuation subdural fluid.      No new hemorrhage.      CT-HEAD W/O   Final Result      No significant interval change from one day prior.      CT-HEAD W/O   Final Result      1.  There is overall stable residual left frontal subdural and subarachnoid hemorrhage.   2.  There is left sided hemispheric edema with 3 mm of left-to-right midline shift, previously 4 mm.   3.  There is no new hemorrhage.   4.  There  has been slight interval resorption of pneumocephalus with otherwise stable left craniotomy changes.   5.  Focal hypodensity in the inferior left frontal lobe again seen most consistent with an area of evolving ischemia.   6.  Underlying atrophy and white matter disease again noted.      CT-HEAD W/O   Final Result      1.  Stable sequela of left craniotomy, with stable left-sided subarachnoid and subdural hemorrhage.   2.  Residual 4 mm left-to-right midline shift.   3.  No CT evidence of new hemorrhage or large vascular territory infarct.      CT-HEAD W/O   Final Result      Interval left craniotomy with overall decrease in volume of subdural hematoma with some postsurgical extra-axial hemorrhage deep to the craniotomy flap with associated subarachnoid hemorrhage.      Persistent mass effect and midline shift from left-to-right of approximately 5 mm.      CT-HEAD W/O   Final Result      Left extra-axial hematoma is again noted. Subacute blood closer to the vertex along the left frontoparietal lobes is mildly increased compared to prior measuring 1.2 compared to 1 cm.      Mass effect is again noted with 4.6 mm midline shift to the right, slightly increased compared to prior.      CT-ABDOMEN-PELVIS WITH   Final Result      1.  Ascites is no longer identified.      2.  Liver surface is mildly nodular consistent with cirrhosis.      3.  Splenomegaly is again noted. Punctate focus of decreased attenuation in the spleen could be due to normal differential enhancement although small hemangioma is also possible.      4.  Ventral abdominal wall hernia containing abdominal fat is again identified.      5.  Consolidation and volume loss is noted posteriorly in the right lower lobe which could indicate pneumonia.      6.  Diverticulosis.         DX-CHEST-PORTABLE (1 VIEW)   Final Result      Small right pleural effusion.      Right basilar opacities may represent atelectasis or scarring.         CT-HEAD W/O   Final Result       1.  Stable size of the extra-axial hemorrhage overlying the left cerebral hemisphere with sulci effacement and approximately 3 mm of left-to-right midline shift.         IR-CAROTID-CEREBRAL BILATERAL   Final Result         1.  Normal cerebral arteriogram.      2.  No evidence of arteriovenous malformation or intracranial aneurysm formation.      3.  No evidence of atherosclerotic disease in the right common femoral artery.      CT-CTA HEAD WITH & W/O-POST PROCESS   Final Result      No evidence of occlusive lesion or aneurysm.      Multiple tortuous small vessels along the anterior-inferior aspect of the left temporal lobe could represent some type of vascular malformation.      No significant interval change in left-sided holohemispheric subdural hematoma extending along the falx and layering over the tentorium.      CT-CTA NECK WITH & W/O-POST PROCESSING   Final Result      CT angiogram of the neck within normal limits.      CT-HEAD W/O   Final Result         1. Grossly unchanged left hemispheric subdural hematoma, most in the left frontal and temporal region.   2. No new intracranial hemorrhage.                  CT-HEAD W/O   Final Result      1.  Stable large LEFT hemispheric subdural hematoma with associated mass effect and midline shift.   2.  No significant change from prior exam obtained 2 hours earlier.      CT-HEAD W/O   Final Result      Large LEFT holohemispheric subdural hematoma with associated mass effect and 4 mm LEFT to RIGHT midline shift.      These findings were discussed with EFREM FITZGERALD on 3/12/2021 11:40 AM.      DX-CHEST-PORTABLE (1 VIEW)   Final Result      1.  Hypoinflation, improved from prior.   2.  Minimal RIGHT lung base atelectasis or scar.   3.  Minimal RIGHT pleural effusion.      CT-LSPINE W/O PLUS RECONS    (Results Pending)        Assessment/Plan  * Thrombocytopenia (HCC)  Assessment & Plan  Unsure etiology  HIT negative  SPEP negative  Meds? Keppra vs allopurinol?  Heme  consulted: s/lp IVIG and high dose steroids without significant improvement, unlikely ITP or autoimmune process. RF +, lupus anticoagulant negative  - last plt transfusion 4/7, with improvement in plt 42-->60 (an hour after transfusion) -->66 next morning         Subdural hematoma, acute (HCC)  Assessment & Plan  Continues to have headache  Neurosurgery following and recommending continuing steroids and Keppra for seizure prophylaxis. On steroid taper  Repeat CT scan 3/22: 1.2cm extra axial hematoma (from 1cm) with 4.6mm midline shift to right slightly greater compared to prior imaging  Neurosurgery following  PT/OT/SLP following, continue therapy    S/p craniotomy 3/25  Cont Neuro checks  BP control  Sz prophylaxis  PT/OT/SLP    3/30: As per neurosurgery, patient able to be discharged to skilled nursing facility when ready.  Staples and sutures out 2 weeks postop, follow-up with [facility] neurosurgery group in 4 weeks with a new head CT as per neurosurgery note, their office will arrange it.  Neurosurgery has also increased salt tablets to 2 g 3 times daily with Florinef.    3/31: Patient seems more lethargic this morning.  Neurosurgery repeated CT scan which did not reveal acute abnormality.  Neurosurgery would like to have an MRI of the brain, patient with pacemaker we will inquire if it is compatible with MRI machine.  Neurosurgery also recommends starting the patient on Decadron 4 mg every 6 hours for now.    4/2: We have started the tapering of Decadron as per neurosurgery's recommendation.    4/8: repeat CT Head showed slight interval decrease in size and density of extra-axial hemorrhage deep to craniotomy flap. Decreased attenuation along the inferior aspect left frontal lobe could represent encephalomalacia or low attenuation subdural fluid.     History of prosthetic mitral valve  Assessment & Plan  With paroxysmal atrial fibrillation  Anticoagulation reversed given subdural hematoma  Consulted cardiology:  okay with just aspirin but given thrombocytopenia will likely need to continue holding    COPD (chronic obstructive pulmonary disease) (HCC)- (present on admission)  Assessment & Plan  Patient not in acute exacerbation or requiring oxygen  O2/RT protocols    Gout- (present on admission)  Assessment & Plan  Due to ongoing thrombocytopenia and as per hematology's recommendation we will hold allopurinol for now.  Patient currently receiving steroids.    Type 2 diabetes mellitus without complication, without long-term current use of insulin (HCC)- (present on admission)  Assessment & Plan  Patient with hyperglycemia worsened by steroids  A1C 5.9  lantus 15 units  SSI  Well controled on current regimen however will likely need to cut back lantus as decadron is tapered off    3/30: We will stop lantus, and continue with ISS. We will monitor and make changes accordingly. Hypoglycemia protocol in place.    3/31: As per neurosurgery we have started the patient on Decadron 4 mg every 6 hours, we will monitor glucose closely if needed we will restart Lantus.    4/5: Patient currently on decadron tapering. We will continue ISS and hypoglycemia protocol for now. Monitor and make changes accordingly.        Atrial fibrillation (HCC)  Assessment & Plan  Holding anticoagulation in setting of acute SDH  Rate control with metoprolol  Cont Tele monitoring    3/31: As per neurosurgery patient to resume anticoagulation in 2 weeks, unless he requires new neurosurgical intervention.      Pancytopenia (HCC)  Assessment & Plan  Has had normocytic anemia since 2019  Plt and WBC dropped during this admission  Not neutropenic  CTM      Seizure (HCC)  Assessment & Plan  Secondary to SDH  Witnessed event: pt started with aphasia then gaze went upward and fixed with head tremor.  Pt was then unresponsive.  Episode lasting approx 1 min with rapid return to baseline   2 Szs on 3/28 none since  Cont Keppra 1g BID    4/1: Due to ongoing  thrombocytopenia as per hematology Keppra can sometimes cause low platelets, so we have switched to Vimpat as per neurology's recommendation.    Cognitive deficits  Assessment & Plan  Evaluated by SLP and found to have cognitive deficits, recommending SNF  Cognitive deficits likely secondary to SDH  Discussed with friend, he is not at previous baseline    3/23 Will likely need SNF placement, order placed  CM to assist  Accepted by HH  Noncompliance with pt/ot  Encourage activity    4/5: Pending placement.    4/8: refusing SNF, will have PT/OT/ST re-evaluate    Alcoholic cirrhosis of liver without ascites (HCC)  Assessment & Plan  Patient reports history of heavy alcohol use, has not drank in few years  Will need GI follow up as outpatient  Denies previous diagnosis of liver disease  Cirrhotic appearing liver on imaging without ascites  Hep panel neg  Ammonia 26    4/5: Mild elevation of ALT, in the setting of liver disease. Patient currently not complaining of abdominal pain. If it continues to trend upwards or develops new abdominal pain we will consider re-imaging.    Venous stasis dermatitis of right lower extremity  Assessment & Plan  Does not appear infected on examination  Continue skin care per nursing protocol    Grade IV hemorrhoids  Assessment & Plan  Patient reports chronic hemorrhoids with occasional mild rectal bleeding  lidocaine gel for pain relief        Hyponatremia  Assessment & Plan  Asymptomatic hyponatremia  Hyponatremia worsening 3/22 now down to 126  3/23 stable at 129, cont current management  Likely SIADH  Free water restriction  Due to subdural hematoma will start salt supplementation    4/5: Continue salt tablets and florinef as per neurosurgery recommendations.    Valvular cardiomyopathy (HCC)- (present on admission)  Assessment & Plan  History of bioprosthetic mitral valve replacement    Hypertension  Assessment & Plan  Patient on metoprolol and lisinopril  PRN medications  Monitor and  make changes accordingly    Pacemaker  Assessment & Plan  History of pacemaker limiting ability to obtain MRI    Obesity (BMI 30-39.9)- (present on admission)  Assessment & Plan  Will need weight loss program on discharge  Patient counseled on lifestyle modifications  Will need close follow up as outpatient.    Benign prostatic hyperplasia without lower urinary tract symptoms- (present on admission)  Assessment & Plan  Continue Flomax  No evidence of urinary obstruction       VTE prophylaxis: SCDs

## 2021-04-09 NOTE — PROGRESS NOTES
Oncology/Hematology Progress Note               Author: Ventura Lange M.D.    Cc: Thrombocytopenia Date & Time created: 4/9/2021  12:28 PM     Interval History:    -patient seen this am, playing solitaire, in good spirits, no acute complaints  -platelets stable 60.  No melena, bruises, bleeding from orifices  -cardiology consulted, who recommends ASA alone is okay for afib/bioprostethic mitral valve, with possible outpatient watchmen device          PMHx, PSurgHx, SocHx, FAMHx:  All reviewed and no changes    Review of Systems:  Review of Systems   Constitutional: Positive for malaise/fatigue. Negative for chills and fever.   HENT: Negative for congestion, nosebleeds, sinus pain and sore throat.    Eyes: Negative for blurred vision, pain, discharge and redness.   Respiratory: Negative for cough, sputum production and shortness of breath.    Cardiovascular: Negative for chest pain, palpitations and leg swelling.   Gastrointestinal: Negative for abdominal pain, diarrhea, heartburn, nausea and vomiting.   Genitourinary: Negative for dysuria, frequency, hematuria and urgency.   Musculoskeletal: Positive for back pain and joint pain.   Skin: Negative for itching and rash.   Neurological: Positive for weakness and headaches.       Physical Exam:  Physical Exam  Vitals reviewed.   Constitutional:       General: He is not in acute distress.     Appearance: Normal appearance. He is not toxic-appearing.   HENT:      Head: Normocephalic.      Comments: Prior left craniotomy surgical changes     Mouth/Throat:      Mouth: Mucous membranes are moist.      Pharynx: Oropharynx is clear. No oropharyngeal exudate.   Eyes:      General: No scleral icterus.        Right eye: No discharge.         Left eye: No discharge.      Extraocular Movements: Extraocular movements intact.   Cardiovascular:      Rate and Rhythm: Normal rate and regular rhythm.      Heart sounds: No murmur. No gallop.    Pulmonary:      Effort: Pulmonary effort  is normal. No respiratory distress.      Breath sounds: Normal breath sounds. No wheezing, rhonchi or rales.   Abdominal:      General: Abdomen is flat. Bowel sounds are normal. There is no distension.      Palpations: Abdomen is soft.      Tenderness: There is no abdominal tenderness. There is no guarding.   Musculoskeletal:         General: No signs of injury. Normal range of motion.      Cervical back: Normal range of motion and neck supple. No tenderness.      Right lower leg: No edema.      Left lower leg: No edema.   Skin:     General: Skin is warm and dry.      Coloration: Skin is not jaundiced.      Findings: No rash.   Neurological:      General: No focal deficit present.      Mental Status: He is alert and oriented to person, place, and time.   Psychiatric:         Mood and Affect: Mood normal.         Thought Content: Thought content normal.         Judgment: Judgment normal.         Labs:          Recent Labs     04/07/21 0400 04/08/21 0151 04/09/21  0153   SODIUM 130* 131* 132*   POTASSIUM 4.3 4.4 4.2   CHLORIDE 100 101 101   CO2 25 24 25   BUN 20 25* 28*   CREATININE 0.72 0.76 0.80   CALCIUM 8.3* 8.3* 8.0*     Recent Labs     04/07/21  0400 04/08/21  0151 04/09/21  0153   GLUCOSE 171* 138* 177*     Recent Labs     04/07/21  0400 04/07/21  0400 04/07/21  1903 04/08/21  0151 04/09/21  0153   RBC 3.35*  --   --  3.51* 3.43*   HEMOGLOBIN 11.2*  --   --  11.4* 11.4*   HEMATOCRIT 32.4*  --   --  35.0* 34.0*   PLATELETCT 42*   < > 60* 66* 60*    < > = values in this interval not displayed.     Recent Labs     04/07/21  0400 04/08/21  0151 04/09/21  0153   WBC 3.6* 4.7* 4.7*   NEUTSPOLYS 77.10* 75.60* 79.30*   LYMPHOCYTES 14.30* 13.50* 10.80*   MONOCYTES 7.20 8.60 7.40   EOSINOPHILS 0.00 0.20 0.40   BASOPHILS 0.00 0.20 0.20     Recent Labs     04/07/21  0400 04/08/21  0151 04/09/21  0153   SODIUM 130* 131* 132*   POTASSIUM 4.3 4.4 4.2   CHLORIDE 100 101 101   CO2 25 24 25   GLUCOSE 171* 138* 177*   BUN 20  25* 28*   CREATININE 0.72 0.76 0.80   CALCIUM 8.3* 8.3* 8.0*     Hemodynamics:  Temp (24hrs), Av.3 °C (97.4 °F), Min:35.9 °C (96.7 °F), Max:37 °C (98.6 °F)  Temperature: 35.9 °C (96.7 °F)  Pulse  Av.7  Min: 34  Max: 92   Blood Pressure : 117/74     Respiratory:    Respiration: 20, Pulse Oximetry: 98 %     Work Of Breathing / Effort: Within Normal Limits  RUL Breath Sounds: Clear, RML Breath Sounds: Clear, RLL Breath Sounds: Diminished, ROBERT Breath Sounds: Clear, LLL Breath Sounds: Diminished  Fluids:    Intake/Output Summary (Last 24 hours) at 2021 1258  Last data filed at 2021 0540  Gross per 24 hour   Intake 200 ml   Output 600 ml   Net -400 ml        GI/Nutrition:  Orders Placed This Encounter   Procedures   • Diet Order Diet: Level 7 - Easy to Chew; Liquid level: Level 0 - Thin; Second Modifier: (optional): Consistent CHO (Diabetic)     Standing Status:   Standing     Number of Occurrences:   1     Order Specific Question:   Diet:     Answer:   Level 7 - Easy to Chew [22]     Order Specific Question:   Liquid level     Answer:   Level 0 - Thin     Order Specific Question:   Second Modifier: (optional)     Answer:   Consistent CHO (Diabetic) [4]     Medical Decision Making, by Problem:  Active Hospital Problems    Diagnosis    • *Thrombocytopenia (HCC) [D69.6]    • Subdural hematoma, acute (HCC) [S06.5X9A]    • History of prosthetic mitral valve [Z95.2]    • COPD (chronic obstructive pulmonary disease) (HCC) [J44.9]    • Gout [M10.9]    • Type 2 diabetes mellitus without complication, without long-term current use of insulin (HCC) [E11.9]    • Atrial fibrillation (HCC) [I48.91]    • Pacemaker [Z95.0]    • Benign prostatic hyperplasia without lower urinary tract symptoms [N40.0]    • Obesity (BMI 30-39.9) [E66.9]    • Seizure (HCC) [R56.9]    • Cognitive deficits [R41.89]    • Generalized abdominal pain [R10.84]    • Alcoholic cirrhosis of liver without ascites (HCC) [K70.30]    • Hyponatremia  [E87.1]    • Grade IV hemorrhoids [K64.3]    • Venous stasis dermatitis of right lower extremity [I87.2]    • Valvular cardiomyopathy (HCC) [I42.8]        Plan:  1.  Thrombocytopenia--uncertain etiology.  He has some degree of liver cirrhosis, with a chronic platelet count of 110s to 130s.  There is no evidence of DIC with a fibrinogen of 212.  His testing for hepatitis B, hepatitis C, and HIV were negative.  Historically not consistent with TTP/HUS.  There was worry for HIT, with a positive platelet antibody, but he did have a low 4T score and negative [facility] testing essentially rules out HIT.         Not likely to be ITP.  He was on high-dose steroids with no improvement.  Would be unusual to see acute evolution of ITP while in the hospital as well.  He did get 1 dose of IVIG on 4/4 with no benefit.  He does respond to platelet transfusions, although his counts dropped down quickly again.        When he was admitted on 3/12 his platelets were 152.  For the first week they state around the 110s to 150s.  During the second week of his admission they dropped down.  This suggests something acute that happened in the hospital.  My suspicion is it was related to either the Keppra or the allopurinol--both of which were stopped on 4/1.  It may take some time for his counts to recover if it is medication induced.  Sometimes this can take 2 to 4 weeks.    --B12, folate WNL  --SPEP/JORDAN pending  --SPENCER, ESR, lupus anticoagulant WNL.  RF mildy elevated at 18  --Transfuse if platelets go below 40 (or if any new bleeding, keep platelets above 50K)  --trial of platelet transfusion on 4/7 showed good platelet count rise--therefore this is not ITP or immune mediated thrombocytopenia      2.  SDH--management per medicine team and neurosurgery.  Has had no reaccumulation since his operative evacuation of the subdural.  --Medicine team following  --Neurosurgery recommends holding off on any anticoagulation, for 2 more weeks        3.   Mitral valve replacement/atrial fibrillation --he was previously on Coumadin.  All anticoagulation has been stopped, given his subdural hematoma.  --Hold anticoagulation until he is medically safe for this  --cardiology was consulted, who recommends aspirin alone is adequate for atrial fibrillation risk, once cleared by neurosurgery            Highly complex patient with a high risk of morbidity and mortality.  We will continue to monitor.    Quality-Core Measures   Reviewed items::  Labs reviewed and Medications reviewed  DVT prophylaxis pharmacological::  Contraindicated - High bleeding risk

## 2021-04-09 NOTE — DISCHARGE PLANNING
Received Choice form at [phone]  Agency/Facility Name: [facility]  Referral sent per Choice form @ [phone]

## 2021-04-09 NOTE — DISCHARGE PLANNING
Anticipated Discharge Disposition: home with HH    Action: Spoke with the patient at the bedside about HH services. The patient had [facility] prior to this admission and would like to have them again.    Choice form faxed to [facility] at [phone].    Barriers to Discharge: Medical clearance, HH acceptance    Plan: Will follow up  With HH

## 2021-04-10 NOTE — CARE PLAN
Problem: Knowledge Deficit  Goal: Knowledge of disease process/condition, treatment plan, diagnostic tests, and medications will improve  Outcome: PROGRESSING AS EXPECTED     Problem: Skin Integrity  Goal: Risk for impaired skin integrity will decrease  Outcome: PROGRESSING AS EXPECTED     Problem: Respiratory:  Goal: Respiratory status will improve  Outcome: PROGRESSING AS EXPECTED     Problem: Urinary Elimination:  Goal: Ability to reestablish a normal urinary elimination pattern will improve  Outcome: PROGRESSING AS EXPECTED     Problem: Safety  Goal: Will remain free from injury  Outcome: PROGRESSING AS EXPECTED     Problem: Pain Management  Goal: Pain level will decrease to patient's comfort goal  Outcome: PROGRESSING SLOWER THAN EXPECTED

## 2021-04-10 NOTE — PROGRESS NOTES
[facility] Medicine Daily Progress Note    Date of Service  4/10/2021    Chief Complaint  65 y.o. male admitted 3/12/2021 with altered mentation.    Hospital Course  66yo PMHx ETOH, AFIb, anticoagulation with warfarin, CAD, bioprosthetic MVR, gout, HTN, PPM, DVT/PE, DM.  Admitted after EMS called (friend didn't hear from patient in 3 days) and found pt with R side deficits.  In ED CT demonstrating L wholohemispheric SDH.  INR reversed with KCentra.  Initially treated non operatively however surveilance CT morena 3/22 showed the SDH was enlarging so taken to the OR.  Angiogram on 3/15 neg for vascular abnormalities.  On 3/25 taken to OR for L craniotomy and evacuation by Dr Hudson.  On 3/28 witnessed Sz, patient started on keppra then switched to Vimpat d/t thrombocytopenia.  Patient also started on dexamethasone, salt tabs and Florinef.  Patient had ongoing severe thrombocytopenia.  Hematology consulted.  Negative for HIT.  Received platelet transfusions for platelets less than 40.  Patient started on IVIG on 4/4.       Interval Problem Update  -No acute overnight events  - Plt improving 66-->60-->71  - c/o back pain today, reports chronic, CT L spine without acute fracture or hematoma  - ambulating to bathroom with FWW    Consultants/Specialty  Neurosurgery  Hematology  Cardiology    Code Status  Full Code    Disposition  HH vs home, had long conversation with patient regarding risks of being home without rehab including falls and even death, patient understands risks, patient is competent and has capacity to make decisions for himself    Review of Systems  Review of Systems   Constitutional: Negative for chills and fever.   HENT: Negative for nosebleeds and sore throat.    Eyes: Negative for blurred vision and double vision.   Respiratory: Negative for cough and shortness of breath.    Cardiovascular: Negative for chest pain, palpitations and leg swelling.   Gastrointestinal: Negative for abdominal pain, diarrhea,  nausea and vomiting.   Genitourinary: Negative for dysuria and urgency.   Musculoskeletal: Positive for back pain.   Skin: Negative for rash.   Neurological: Positive for headaches. Negative for dizziness, seizures and loss of consciousness.        Physical Exam  Temp:  [35.9 °C (96.6 °F)-36.4 °C (97.5 °F)] 35.9 °C (96.6 °F)  Pulse:  [80-84] 84  Resp:  [17-18] 18  BP: ()/(65-82) 97/68  SpO2:  [96 %-98 %] 96 %    Physical Exam  Vitals reviewed.   Constitutional:       General: He is not in acute distress.     Appearance: Normal appearance. He is well-developed. He is not diaphoretic.   HENT:      Head: Normocephalic and atraumatic.      Comments: L scalp incision noted.  No signs of infection     Nose: Nose normal.      Mouth/Throat:      Mouth: Mucous membranes are moist.   Eyes:      General: No scleral icterus.     Extraocular Movements: Extraocular movements intact.      Conjunctiva/sclera: Conjunctivae normal.      Pupils: Pupils are equal, round, and reactive to light.   Neck:      Vascular: No JVD.   Cardiovascular:      Rate and Rhythm: Normal rate and regular rhythm.      Heart sounds: No murmur. No gallop.    Pulmonary:      Effort: Pulmonary effort is normal. No respiratory distress.      Breath sounds: No stridor. No wheezing or rales.   Abdominal:      General: Bowel sounds are normal. There is no distension.      Palpations: Abdomen is soft.      Tenderness: There is no abdominal tenderness. There is no guarding or rebound.      Hernia: A hernia (ventral) is present.   Musculoskeletal:      Cervical back: Normal range of motion.      Right lower leg: No edema.      Left lower leg: No edema.   Skin:     General: Skin is warm and dry.      Findings: Bruising (scattered on extremities and abdomen) present. No rash.   Neurological:      General: No focal deficit present.      Mental Status: He is alert and oriented to person, place, and time.      Sensory: No sensory deficit.      Motor: No weakness.    Psychiatric:         Mood and Affect: Mood normal.         Behavior: Behavior normal.         Thought Content: Thought content normal.         Fluids  No intake or output data in the 24 hours ending 04/10/21 1448    Laboratory  Recent Labs     04/08/21  0151 04/09/21  0153 04/10/21  0229   WBC 4.7* 4.7* 6.4   RBC 3.51* 3.43* 3.91*   HEMOGLOBIN 11.4* 11.4* 13.0*   HEMATOCRIT 35.0* 34.0* 39.5*   MCV 99.7* 99.1* 101.0*   MCH 32.5 33.2* 33.2*   MCHC 32.6* 33.5* 32.9*   RDW 60.6* 60.1* 62.0*   PLATELETCT 66* 60* 71*   MPV 11.9 11.0 11.0     Recent Labs     04/08/21 0151 04/09/21  0153   SODIUM 131* 132*   POTASSIUM 4.4 4.2   CHLORIDE 101 101   CO2 24 25   GLUCOSE 138* 177*   BUN 25* 28*   CREATININE 0.76 0.80   CALCIUM 8.3* 8.0*                   Imaging  CT-LSPINE W/O PLUS RECONS   Final Result      1.  No evidence of lumbar spine fracture.      2.  Multilevel degenerative disc disease and facet degeneration. There are varying degrees of central canal and neuroforaminal narrowing as specifically described above.      3.  Scoliosis.      CT-HEAD W/O   Final Result      Slight interval decrease in size and density of extra-axial hemorrhage deep to craniotomy flap.      Decreased attenuation along the inferior aspect left frontal lobe could represent encephalomalacia or low attenuation subdural fluid.      No new hemorrhage.      CT-HEAD W/O   Final Result      No significant interval change from one day prior.      CT-HEAD W/O   Final Result      1.  There is overall stable residual left frontal subdural and subarachnoid hemorrhage.   2.  There is left sided hemispheric edema with 3 mm of left-to-right midline shift, previously 4 mm.   3.  There is no new hemorrhage.   4.  There has been slight interval resorption of pneumocephalus with otherwise stable left craniotomy changes.   5.  Focal hypodensity in the inferior left frontal lobe again seen most consistent with an area of evolving ischemia.   6.  Underlying  atrophy and white matter disease again noted.      CT-HEAD W/O   Final Result      1.  Stable sequela of left craniotomy, with stable left-sided subarachnoid and subdural hemorrhage.   2.  Residual 4 mm left-to-right midline shift.   3.  No CT evidence of new hemorrhage or large vascular territory infarct.      CT-HEAD W/O   Final Result      Interval left craniotomy with overall decrease in volume of subdural hematoma with some postsurgical extra-axial hemorrhage deep to the craniotomy flap with associated subarachnoid hemorrhage.      Persistent mass effect and midline shift from left-to-right of approximately 5 mm.      CT-HEAD W/O   Final Result      Left extra-axial hematoma is again noted. Subacute blood closer to the vertex along the left frontoparietal lobes is mildly increased compared to prior measuring 1.2 compared to 1 cm.      Mass effect is again noted with 4.6 mm midline shift to the right, slightly increased compared to prior.      CT-ABDOMEN-PELVIS WITH   Final Result      1.  Ascites is no longer identified.      2.  Liver surface is mildly nodular consistent with cirrhosis.      3.  Splenomegaly is again noted. Punctate focus of decreased attenuation in the spleen could be due to normal differential enhancement although small hemangioma is also possible.      4.  Ventral abdominal wall hernia containing abdominal fat is again identified.      5.  Consolidation and volume loss is noted posteriorly in the right lower lobe which could indicate pneumonia.      6.  Diverticulosis.         DX-CHEST-PORTABLE (1 VIEW)   Final Result      Small right pleural effusion.      Right basilar opacities may represent atelectasis or scarring.         CT-HEAD W/O   Final Result      1.  Stable size of the extra-axial hemorrhage overlying the left cerebral hemisphere with sulci effacement and approximately 3 mm of left-to-right midline shift.         IR-CAROTID-CEREBRAL BILATERAL   Final Result         1.  Normal  cerebral arteriogram.      2.  No evidence of arteriovenous malformation or intracranial aneurysm formation.      3.  No evidence of atherosclerotic disease in the right common femoral artery.      CT-CTA HEAD WITH & W/O-POST PROCESS   Final Result      No evidence of occlusive lesion or aneurysm.      Multiple tortuous small vessels along the anterior-inferior aspect of the left temporal lobe could represent some type of vascular malformation.      No significant interval change in left-sided holohemispheric subdural hematoma extending along the falx and layering over the tentorium.      CT-CTA NECK WITH & W/O-POST PROCESSING   Final Result      CT angiogram of the neck within normal limits.      CT-HEAD W/O   Final Result         1. Grossly unchanged left hemispheric subdural hematoma, most in the left frontal and temporal region.   2. No new intracranial hemorrhage.                  CT-HEAD W/O   Final Result      1.  Stable large LEFT hemispheric subdural hematoma with associated mass effect and midline shift.   2.  No significant change from prior exam obtained 2 hours earlier.      CT-HEAD W/O   Final Result      Large LEFT holohemispheric subdural hematoma with associated mass effect and 4 mm LEFT to RIGHT midline shift.      These findings were discussed with EFREM FITZGERALD on 3/12/2021 11:40 AM.      DX-CHEST-PORTABLE (1 VIEW)   Final Result      1.  Hypoinflation, improved from prior.   2.  Minimal RIGHT lung base atelectasis or scar.   3.  Minimal RIGHT pleural effusion.           Assessment/Plan  * Thrombocytopenia (HCC)  Assessment & Plan  Unsure etiology  HIT negative  SPEP negative  Meds? Keppra vs allopurinol?  Heme consulted (signed off 4/10): s/lp IVIG and high dose steroids without significant improvement, unlikely ITP or autoimmune process. RF +, lupus anticoagulant negative, SPENCER 1:320 speckled  - last plt transfusion 4/7, with improvement in plt 42-->60 (an hour after transfusion) -->66 next  morning  - improving 60-->71         Subdural hematoma, acute (HCC)  Assessment & Plan  Continues to have headache  Neurosurgery following and recommending continuing steroids and Keppra for seizure prophylaxis. On steroid taper  Repeat CT scan 3/22: 1.2cm extra axial hematoma (from 1cm) with 4.6mm midline shift to right slightly greater compared to prior imaging  Neurosurgery following  PT/OT/SLP following, continue therapy    S/p craniotomy 3/25  Cont Neuro checks  BP control  Sz prophylaxis  PT/OT/SLP    3/30: As per neurosurgery, patient able to be discharged to skilled nursing facility when ready.  Staples and sutures out 2 weeks postop, follow-up with [facility] neurosurgery group in 4 weeks with a new head CT as per neurosurgery note, their office will arrange it.  Neurosurgery has also increased salt tablets to 2 g 3 times daily with Florinef.    3/31: Patient seems more lethargic this morning.  Neurosurgery repeated CT scan which did not reveal acute abnormality.  Neurosurgery would like to have an MRI of the brain, patient with pacemaker we will inquire if it is compatible with MRI machine.  Neurosurgery also recommends starting the patient on Decadron 4 mg every 6 hours for now.    4/2: We have started the tapering of Decadron as per neurosurgery's recommendation.    4/8: repeat CT Head showed slight interval decrease in size and density of extra-axial hemorrhage deep to craniotomy flap. Decreased attenuation along the inferior aspect left frontal lobe could represent encephalomalacia or low attenuation subdural fluid.     History of prosthetic mitral valve  Assessment & Plan  With paroxysmal atrial fibrillation  Anticoagulation reversed given subdural hematoma  Consulted cardiology: okay with just aspirin but given thrombocytopenia will likely need to continue holding    COPD (chronic obstructive pulmonary disease) (HCC)- (present on admission)  Assessment & Plan  Patient not in acute exacerbation or  requiring oxygen  O2/RT protocols    Gout- (present on admission)  Assessment & Plan  Due to ongoing thrombocytopenia and as per hematology's recommendation we will hold allopurinol for now.  Patient currently receiving steroids.    Type 2 diabetes mellitus without complication, without long-term current use of insulin (HCC)- (present on admission)  Assessment & Plan  Patient with hyperglycemia worsened by steroids  A1C 5.9  lantus 15 units  SSI  Well controled on current regimen however will likely need to cut back lantus as decadron is tapered off    3/30: We will stop lantus, and continue with ISS. We will monitor and make changes accordingly. Hypoglycemia protocol in place.    3/31: As per neurosurgery we have started the patient on Decadron 4 mg every 6 hours, we will monitor glucose closely if needed we will restart Lantus.    4/5: Patient currently on decadron tapering. We will continue ISS and hypoglycemia protocol for now. Monitor and make changes accordingly.        Atrial fibrillation (HCC)  Assessment & Plan  Holding anticoagulation in setting of acute SDH  Rate control with metoprolol  Cont Tele monitoring    3/31: As per neurosurgery patient to resume anticoagulation in 2 weeks, unless he requires new neurosurgical intervention.      Pancytopenia (HCC)  Assessment & Plan  Has had normocytic anemia since 2019  Plt and WBC dropped during this admission  Not neutropenic  CTM      Seizure (HCC)  Assessment & Plan  Secondary to SDH  Witnessed event: pt started with aphasia then gaze went upward and fixed with head tremor.  Pt was then unresponsive.  Episode lasting approx 1 min with rapid return to baseline   2 Szs on 3/28 none since  Cont Keppra 1g BID    4/1: Due to ongoing thrombocytopenia as per hematology Keppra can sometimes cause low platelets, so we have switched to Vimpat as per neurology's recommendation.    Cognitive deficits  Assessment & Plan  Evaluated by SLP and found to have cognitive  deficits, recommending SNF  Cognitive deficits likely secondary to SDH  Discussed with friend, he is not at previous baseline    3/23 Will likely need SNF placement, order placed  CM to assist  Accepted by HH  Noncompliance with pt/ot  Encourage activity    4/5: Pending placement.    4/8: refusing SNF, will have PT/OT/ST re-evaluate    Alcoholic cirrhosis of liver without ascites (HCC)  Assessment & Plan  Patient reports history of heavy alcohol use, has not drank in few years  Will need GI follow up as outpatient  Denies previous diagnosis of liver disease  Cirrhotic appearing liver on imaging without ascites  Hep panel neg  Ammonia 26    4/5: Mild elevation of ALT, in the setting of liver disease. Patient currently not complaining of abdominal pain. If it continues to trend upwards or develops new abdominal pain we will consider re-imaging.    Venous stasis dermatitis of right lower extremity  Assessment & Plan  Does not appear infected on examination  Continue skin care per nursing protocol    Grade IV hemorrhoids  Assessment & Plan  Patient reports chronic hemorrhoids with occasional mild rectal bleeding  lidocaine gel for pain relief        Hyponatremia  Assessment & Plan  Asymptomatic hyponatremia  Hyponatremia worsening 3/22 now down to 126  3/23 stable at 129, cont current management  Likely SIADH  Free water restriction  Due to subdural hematoma will start salt supplementation    4/5: Continue salt tablets and florinef as per neurosurgery recommendations.    Valvular cardiomyopathy (HCC)- (present on admission)  Assessment & Plan  History of bioprosthetic mitral valve replacement    Hypertension  Assessment & Plan  Patient on metoprolol and lisinopril  PRN medications  Monitor and make changes accordingly    Pacemaker  Assessment & Plan  History of pacemaker limiting ability to obtain MRI    Obesity (BMI 30-39.9)- (present on admission)  Assessment & Plan  Will need weight loss program on discharge  Patient  counseled on lifestyle modifications  Will need close follow up as outpatient.    Benign prostatic hyperplasia without lower urinary tract symptoms- (present on admission)  Assessment & Plan  Continue Flomax  No evidence of urinary obstruction       VTE prophylaxis: SCDs

## 2021-04-10 NOTE — PROGRESS NOTES
Oncology/Hematology Progress Note               Author: Ventura Lange M.D.    Cc: Thrombocytopenia Date & Time created: 4/10/2021  1:50 PM     Interval History:  He is doing fairly well.  He has some chronic aches and pains which have not changed.  He has some chronic weakness and fatigue from being in the hospital for a month.  He has no nausea or vomiting.        PMHx, PSurgHx, SocHx, FAMHx:  All reviewed and no changes    Review of Systems:  Review of Systems   Constitutional: Positive for malaise/fatigue. Negative for chills and fever.   HENT: Negative for congestion, nosebleeds and sinus pain.    Eyes: Negative for pain, discharge and redness.   Respiratory: Negative for cough, sputum production and shortness of breath.    Cardiovascular: Negative for chest pain, palpitations and leg swelling.   Gastrointestinal: Negative for abdominal pain, constipation, diarrhea, heartburn, nausea and vomiting.   Genitourinary: Negative for dysuria, frequency and urgency.   Musculoskeletal: Positive for back pain and joint pain.   Skin: Negative for itching and rash.   Neurological: Positive for weakness and headaches.       Physical Exam:  Physical Exam  Vitals reviewed.   Constitutional:       General: He is not in acute distress.     Appearance: Normal appearance. He is not toxic-appearing.      Comments: Weak and frail appearing   HENT:      Head: Normocephalic.      Comments: Prior left craniotomy surgical changes     Mouth/Throat:      Mouth: Mucous membranes are moist.      Pharynx: Oropharynx is clear. No oropharyngeal exudate or posterior oropharyngeal erythema.   Eyes:      General: No scleral icterus.        Right eye: No discharge.         Left eye: No discharge.      Extraocular Movements: Extraocular movements intact.      Conjunctiva/sclera: Conjunctivae normal.   Cardiovascular:      Rate and Rhythm: Normal rate and regular rhythm.      Heart sounds: No murmur. No gallop.    Pulmonary:      Effort:  Pulmonary effort is normal. No respiratory distress.      Breath sounds: Normal breath sounds. No wheezing or rales.   Abdominal:      General: Bowel sounds are normal. There is no distension.      Palpations: Abdomen is soft.      Tenderness: There is no abdominal tenderness. There is no guarding.   Musculoskeletal:         General: No tenderness. Normal range of motion.      Cervical back: Normal range of motion and neck supple. No tenderness.      Right lower leg: No edema.      Left lower leg: No edema.   Lymphadenopathy:      Cervical: No cervical adenopathy.   Skin:     General: Skin is warm and dry.      Coloration: Skin is not jaundiced.      Findings: No erythema or rash.   Neurological:      General: No focal deficit present.      Mental Status: He is alert and oriented to person, place, and time.   Psychiatric:         Mood and Affect: Mood normal.         Thought Content: Thought content normal.         Judgment: Judgment normal.         Labs:          Recent Labs     21   SODIUM 131* 132*   POTASSIUM 4.4 4.2   CHLORIDE 101 101   CO2 24 25   BUN 25* 28*   CREATININE 0.76 0.80   CALCIUM 8.3* 8.0*     Recent Labs     21   GLUCOSE 138* 177*     Recent Labs     04/08/21  0151 04/09/21  0153 04/10/21  0229   RBC 3.51* 3.43* 3.91*   HEMOGLOBIN 11.4* 11.4* 13.0*   HEMATOCRIT 35.0* 34.0* 39.5*   PLATELETCT 66* 60* 71*     Recent Labs     04/08/21  0151 04/09/21  0153 04/10/21  0229   WBC 4.7* 4.7* 6.4   NEUTSPOLYS 75.60* 79.30* 71.20   LYMPHOCYTES 13.50* 10.80* 16.60*   MONOCYTES 8.60 7.40 9.10   EOSINOPHILS 0.20 0.40 0.60   BASOPHILS 0.20 0.20 0.20     Recent Labs     21   SODIUM 131* 132*   POTASSIUM 4.4 4.2   CHLORIDE 101 101   CO2 24 25   GLUCOSE 138* 177*   BUN 25* 28*   CREATININE 0.76 0.80   CALCIUM 8.3* 8.0*     Hemodynamics:  Temp (24hrs), Av.2 °C (97.1 °F), Min:35.9 °C (96.6 °F), Max:36.4 °C (97.5 °F)  Temperature:  35.9 °C (96.6 °F)  Pulse  Av.8  Min: 34  Max: 92   Blood Pressure : (!) 97/68     Respiratory:    Respiration: 18, Pulse Oximetry: 96 %     Work Of Breathing / Effort: Within Normal Limits  RUL Breath Sounds: Clear, RML Breath Sounds: Clear, RLL Breath Sounds: Diminished, ROBERT Breath Sounds: Clear, LLL Breath Sounds: Diminished  Fluids:    Intake/Output Summary (Last 24 hours) at 2021 1258  Last data filed at 2021 0540  Gross per 24 hour   Intake 200 ml   Output 600 ml   Net -400 ml     Weight: 98.7 kg (217 lb 9.5 oz)  GI/Nutrition:  Orders Placed This Encounter   Procedures   • Diet Order Diet: Level 7 - Easy to Chew; Liquid level: Level 0 - Thin; Second Modifier: (optional): Consistent CHO (Diabetic)     Standing Status:   Standing     Number of Occurrences:   1     Order Specific Question:   Diet:     Answer:   Level 7 - Easy to Chew [22]     Order Specific Question:   Liquid level     Answer:   Level 0 - Thin     Order Specific Question:   Second Modifier: (optional)     Answer:   Consistent CHO (Diabetic) [4]     Medical Decision Making, by Problem:  Active Hospital Problems    Diagnosis    • *Thrombocytopenia (HCC) [D69.6]    • Subdural hematoma, acute (HCC) [S06.5X9A]    • History of prosthetic mitral valve [Z95.2]    • COPD (chronic obstructive pulmonary disease) (HCC) [J44.9]    • Gout [M10.9]    • Type 2 diabetes mellitus without complication, without long-term current use of insulin (HCC) [E11.9]    • Atrial fibrillation (HCC) [I48.91]    • Pacemaker [Z95.0]    • Benign prostatic hyperplasia without lower urinary tract symptoms [N40.0]    • Obesity (BMI 30-39.9) [E66.9]    • Seizure (HCC) [R56.9]    • Cognitive deficits [R41.89]    • Generalized abdominal pain [R10.84]    • Alcoholic cirrhosis of liver without ascites (HCC) [K70.30]    • Hyponatremia [E87.1]    • Grade IV hemorrhoids [K64.3]    • Venous stasis dermatitis of right lower extremity [I87.2]    • Valvular cardiomyopathy (HCC)  [I42.8]        Plan:  1.  Thrombocytopenia--he has some degree of chronic thrombocytopenia with platelets in the 110s to 130s related to liver cirrhosis.    On this admission he had an acute drop in his platelets, likely related to Keppra or even the allopurinol.  These were stopped on 4/1.  His platelets got down to the 40s-50s.  Since stopping his platelets have improved.    Negative work-up for DIC, with a normal fibrinogen.  He had negative for normal testing for hepatitis B, hepatitis C, HIV, B12, folate, RF.  His work-up for HIT was ultimately negative with a negative [facility].  He had no evidence of ITP.    --Platelets continue to improve and are now up to 71K  --These should continue to hold steady or improve on their own.  --Avoid Keppra and possibly allopurinol  --No long-term hematology follow-up needed    --We will sign off on the case.  We can be reconsulted if his platelets drop below 50 again.        2.  SDH--management per medicine team and neurosurgery.  Has had no reaccumulation since his operative evacuation of the subdural.  --Medicine team following  --Platelet level fine now for aspirin or anticoagulation  --Neurosurgery recommends holding off on any anticoagulation for 2 more weeks        3.  Mitral valve replacement/atrial fibrillation --he was previously on Coumadin.  All anticoagulation has been stopped, given his subdural hematoma.  --Cardiology feels aspirin alone is adequate for his valve disease and atrial fibrillation  --They recommend not resuming anticoagulation  --They recommend resuming aspirin once neurosurgery clears patient for this              Highly complex patient with a high risk of morbidity and mortality.  We will continue to monitor.    Quality-Core Measures   Reviewed items::  Labs reviewed and Medications reviewed  DVT prophylaxis pharmacological::  Contraindicated - High bleeding risk

## 2021-04-11 NOTE — PROGRESS NOTES
[facility] Medicine Daily Progress Note    Date of Service  4/11/2021    Chief Complaint  65 y.o. male admitted 3/12/2021 with altered mentation.    Hospital Course  66yo PMHx ETOH, AFIb, anticoagulation with warfarin, CAD, bioprosthetic MVR, gout, HTN, PPM, DVT/PE, DM.  Admitted after EMS called (friend didn't hear from patient in 3 days) and found pt with R side deficits.  In ED CT demonstrating L wholohemispheric SDH.  INR reversed with KCentra.  Initially treated non operatively however surveilance CT morena 3/22 showed the SDH was enlarging so taken to the OR.  Angiogram on 3/15 neg for vascular abnormalities.  On 3/25 taken to OR for L craniotomy and evacuation by Dr Hudson.  On 3/28 witnessed Sz, patient started on keppra then switched to Vimpat d/t thrombocytopenia.  Patient also started on dexamethasone, salt tabs and Florinef.  Patient had ongoing severe thrombocytopenia.  Hematology consulted.  Negative for HIT.  Received platelet transfusions for platelets less than 40.  Patient started on IVIG on 4/4.       Interval Problem Update  - No acute overnight events  - Plt improving 66-->60-->71-->81  - BP lower today 80s, asymptomatic, received both BP meds today, dc lisinopril, on metoprolol for afib, taking in some PO but will give 1L over the day/night, check cortisol in am given head trauma, no s/sx of infection  - ambulating halls with FWW  - ST worked with patient, agreed with improvement, still recommending post-acute, patient still refusing, again had conversation about medication mgmt, safety, reasons to come back to hospital  - can take out staples/sutures today    Consultants/Specialty  Neurosurgery  Hematology  Cardiology    Code Status  Full Code    Disposition  HH vs home, had long conversation with patient regarding risks of being home without rehab including falls and even death, patient understands risks, patient is competent and has capacity to make decisions for himself    Review of  Systems  Review of Systems   Constitutional: Negative for chills and fever.   HENT: Negative for nosebleeds and sore throat.    Eyes: Negative for blurred vision and double vision.   Respiratory: Negative for cough and shortness of breath.    Cardiovascular: Negative for chest pain, palpitations and leg swelling.   Gastrointestinal: Negative for abdominal pain, diarrhea, nausea and vomiting.   Genitourinary: Negative for dysuria and urgency.   Musculoskeletal: Positive for back pain.   Skin: Negative for rash.   Neurological: Positive for headaches. Negative for dizziness, seizures and loss of consciousness.        Physical Exam  Temp:  [35.8 °C (96.5 °F)-36.3 °C (97.4 °F)] 35.8 °C (96.5 °F)  Pulse:  [79-85] 80  Resp:  [16-18] 16  BP: ()/(49-73) 87/52  SpO2:  [92 %-98 %] 92 %    Physical Exam  Vitals reviewed.   Constitutional:       General: He is not in acute distress.     Appearance: Normal appearance. He is well-developed. He is not diaphoretic.   HENT:      Head: Normocephalic and atraumatic.      Comments: L scalp incision noted.  No signs of infection     Nose: Nose normal.      Mouth/Throat:      Mouth: Mucous membranes are moist.   Eyes:      General: No scleral icterus.     Extraocular Movements: Extraocular movements intact.      Conjunctiva/sclera: Conjunctivae normal.      Pupils: Pupils are equal, round, and reactive to light.   Neck:      Vascular: No JVD.   Cardiovascular:      Rate and Rhythm: Normal rate and regular rhythm.      Heart sounds: No murmur. No gallop.    Pulmonary:      Effort: Pulmonary effort is normal. No respiratory distress.      Breath sounds: No stridor. No wheezing or rales.   Abdominal:      General: Bowel sounds are normal. There is no distension.      Palpations: Abdomen is soft.      Tenderness: There is no abdominal tenderness. There is no guarding or rebound.      Hernia: A hernia (ventral) is present.   Musculoskeletal:      Cervical back: Normal range of motion.       Right lower leg: No edema.      Left lower leg: No edema.   Skin:     General: Skin is warm and dry.      Findings: Bruising (scattered on extremities and abdomen) present. No rash.   Neurological:      General: No focal deficit present.      Mental Status: He is alert and oriented to person, place, and time.      Sensory: No sensory deficit.      Motor: No weakness.   Psychiatric:         Mood and Affect: Mood normal.         Behavior: Behavior normal.         Thought Content: Thought content normal.         Fluids    Intake/Output Summary (Last 24 hours) at 4/11/2021 1413  Last data filed at 4/11/2021 0800  Gross per 24 hour   Intake 360 ml   Output --   Net 360 ml       Laboratory  Recent Labs     04/09/21  0153 04/10/21  0229 04/11/21 0157   WBC 4.7* 6.4 5.5   RBC 3.43* 3.91* 3.74*   HEMOGLOBIN 11.4* 13.0* 12.1*   HEMATOCRIT 34.0* 39.5* 37.8*   MCV 99.1* 101.0* 101.1*   MCH 33.2* 33.2* 32.4   MCHC 33.5* 32.9* 32.0*   RDW 60.1* 62.0* 62.2*   PLATELETCT 60* 71* 81*   MPV 11.0 11.0 10.8     Recent Labs     04/09/21 0153 04/11/21 0157   SODIUM 132* 135   POTASSIUM 4.2 4.2   CHLORIDE 101 102   CO2 25 25   GLUCOSE 177* 114*   BUN 28* 26*   CREATININE 0.80 0.71   CALCIUM 8.0* 8.5                   Imaging  CT-LSPINE W/O PLUS RECONS   Final Result      1.  No evidence of lumbar spine fracture.      2.  Multilevel degenerative disc disease and facet degeneration. There are varying degrees of central canal and neuroforaminal narrowing as specifically described above.      3.  Scoliosis.      CT-HEAD W/O   Final Result      Slight interval decrease in size and density of extra-axial hemorrhage deep to craniotomy flap.      Decreased attenuation along the inferior aspect left frontal lobe could represent encephalomalacia or low attenuation subdural fluid.      No new hemorrhage.      CT-HEAD W/O   Final Result      No significant interval change from one day prior.      CT-HEAD W/O   Final Result      1.  There is  overall stable residual left frontal subdural and subarachnoid hemorrhage.   2.  There is left sided hemispheric edema with 3 mm of left-to-right midline shift, previously 4 mm.   3.  There is no new hemorrhage.   4.  There has been slight interval resorption of pneumocephalus with otherwise stable left craniotomy changes.   5.  Focal hypodensity in the inferior left frontal lobe again seen most consistent with an area of evolving ischemia.   6.  Underlying atrophy and white matter disease again noted.      CT-HEAD W/O   Final Result      1.  Stable sequela of left craniotomy, with stable left-sided subarachnoid and subdural hemorrhage.   2.  Residual 4 mm left-to-right midline shift.   3.  No CT evidence of new hemorrhage or large vascular territory infarct.      CT-HEAD W/O   Final Result      Interval left craniotomy with overall decrease in volume of subdural hematoma with some postsurgical extra-axial hemorrhage deep to the craniotomy flap with associated subarachnoid hemorrhage.      Persistent mass effect and midline shift from left-to-right of approximately 5 mm.      CT-HEAD W/O   Final Result      Left extra-axial hematoma is again noted. Subacute blood closer to the vertex along the left frontoparietal lobes is mildly increased compared to prior measuring 1.2 compared to 1 cm.      Mass effect is again noted with 4.6 mm midline shift to the right, slightly increased compared to prior.      CT-ABDOMEN-PELVIS WITH   Final Result      1.  Ascites is no longer identified.      2.  Liver surface is mildly nodular consistent with cirrhosis.      3.  Splenomegaly is again noted. Punctate focus of decreased attenuation in the spleen could be due to normal differential enhancement although small hemangioma is also possible.      4.  Ventral abdominal wall hernia containing abdominal fat is again identified.      5.  Consolidation and volume loss is noted posteriorly in the right lower lobe which could indicate  pneumonia.      6.  Diverticulosis.         DX-CHEST-PORTABLE (1 VIEW)   Final Result      Small right pleural effusion.      Right basilar opacities may represent atelectasis or scarring.         CT-HEAD W/O   Final Result      1.  Stable size of the extra-axial hemorrhage overlying the left cerebral hemisphere with sulci effacement and approximately 3 mm of left-to-right midline shift.         IR-CAROTID-CEREBRAL BILATERAL   Final Result         1.  Normal cerebral arteriogram.      2.  No evidence of arteriovenous malformation or intracranial aneurysm formation.      3.  No evidence of atherosclerotic disease in the right common femoral artery.      CT-CTA HEAD WITH & W/O-POST PROCESS   Final Result      No evidence of occlusive lesion or aneurysm.      Multiple tortuous small vessels along the anterior-inferior aspect of the left temporal lobe could represent some type of vascular malformation.      No significant interval change in left-sided holohemispheric subdural hematoma extending along the falx and layering over the tentorium.      CT-CTA NECK WITH & W/O-POST PROCESSING   Final Result      CT angiogram of the neck within normal limits.      CT-HEAD W/O   Final Result         1. Grossly unchanged left hemispheric subdural hematoma, most in the left frontal and temporal region.   2. No new intracranial hemorrhage.                  CT-HEAD W/O   Final Result      1.  Stable large LEFT hemispheric subdural hematoma with associated mass effect and midline shift.   2.  No significant change from prior exam obtained 2 hours earlier.      CT-HEAD W/O   Final Result      Large LEFT holohemispheric subdural hematoma with associated mass effect and 4 mm LEFT to RIGHT midline shift.      These findings were discussed with EFREM FITZGERALD on 3/12/2021 11:40 AM.      DX-CHEST-PORTABLE (1 VIEW)   Final Result      1.  Hypoinflation, improved from prior.   2.  Minimal RIGHT lung base atelectasis or scar.   3.  Minimal  RIGHT pleural effusion.           Assessment/Plan  * Thrombocytopenia (HCC)  Assessment & Plan  Unsure etiology  HIT negative  SPEP negative  Meds? Keppra vs allopurinol?  Heme consulted (signed off 4/10): s/lp IVIG and high dose steroids without significant improvement, unlikely ITP or autoimmune process. RF +, lupus anticoagulant negative, SPENCER 1:320 speckled, anti-ds negative  - last plt transfusion 4/7, with improvement in plt 42-->60 (an hour after transfusion) -->66 next morning  - improving 60-->71-->81         Subdural hematoma, acute (HCC)  Assessment & Plan  Continues to have headache  Neurosurgery following and recommending continuing steroids and Keppra for seizure prophylaxis. On steroid taper  Repeat CT scan 3/22: 1.2cm extra axial hematoma (from 1cm) with 4.6mm midline shift to right slightly greater compared to prior imaging  Neurosurgery following  PT/OT/SLP following, continue therapy    S/p craniotomy 3/25  Cont Neuro checks  BP control  Sz prophylaxis  PT/OT/SLP    3/30: As per neurosurgery, patient able to be discharged to skilled nursing facility when ready.  Staples and sutures out 2 weeks postop, follow-up with [facility] neurosurgery group in 4 weeks with a new head CT as per neurosurgery note, their office will arrange it.  Neurosurgery has also increased salt tablets to 2 g 3 times daily with Florinef.    3/31: Patient seems more lethargic this morning.  Neurosurgery repeated CT scan which did not reveal acute abnormality.  Neurosurgery would like to have an MRI of the brain, patient with pacemaker we will inquire if it is compatible with MRI machine.  Neurosurgery also recommends starting the patient on Decadron 4 mg every 6 hours for now.    4/2: We have started the tapering of Decadron as per neurosurgery's recommendation.    4/8: repeat CT Head showed slight interval decrease in size and density of extra-axial hemorrhage deep to craniotomy flap. Decreased attenuation along the inferior  aspect left frontal lobe could represent encephalomalacia or low attenuation subdural fluid.     History of prosthetic mitral valve  Assessment & Plan  With paroxysmal atrial fibrillation  Anticoagulation reversed given subdural hematoma  Consulted cardiology: okay with just aspirin (per neurosurgery, can restart in 2 weeks)    COPD (chronic obstructive pulmonary disease) (Former[facility])- (present on admission)  Assessment & Plan  Patient not in acute exacerbation or requiring oxygen  O2/RT protocols    Gout- (present on admission)  Assessment & Plan  Due to ongoing thrombocytopenia and as per hematology's recommendation we will hold allopurinol for now.  Patient currently receiving steroids.    Type 2 diabetes mellitus without complication, without long-term current use of insulin ([facility])- (present on admission)  Assessment & Plan  Patient with hyperglycemia worsened by steroids  A1C 5.9  lantus 15 units  SSI  Well controled on current regimen however will likely need to cut back lantus as decadron is tapered off    3/30: We will stop lantus, and continue with ISS. We will monitor and make changes accordingly. Hypoglycemia protocol in place.    3/31: As per neurosurgery we have started the patient on Decadron 4 mg every 6 hours, we will monitor glucose closely if needed we will restart Lantus.    4/5: Patient currently on decadron tapering. We will continue ISS and hypoglycemia protocol for now. Monitor and make changes accordingly.        Atrial fibrillation ([facility])  Assessment & Plan  Holding anticoagulation in setting of acute SDH  Rate control with metoprolol  Cont Tele monitoring    3/31: As per neurosurgery patient to resume anticoagulation in 2 weeks, unless he requires new neurosurgical intervention.      Pancytopenia (HCC)  Assessment & Plan  Has had normocytic anemia since 2019  Plt and WBC dropped during this admission  Not neutropenic  CTM      Seizure ([facility])  Assessment & Plan  Secondary to SDH  Witnessed event: pt  started with aphasia then gaze went upward and fixed with head tremor.  Pt was then unresponsive.  Episode lasting approx 1 min with rapid return to baseline   2 Szs on 3/28 none since  Cont Keppra 1g BID    4/1: Due to ongoing thrombocytopenia as per hematology Keppra can sometimes cause low platelets, so we have switched to Vimpat as per neurology's recommendation.    Cognitive deficits  Assessment & Plan  Evaluated by SLP and found to have cognitive deficits, recommending SNF  Cognitive deficits likely secondary to SDH  Discussed with friend, he is not at previous baseline    3/23 Will likely need SNF placement, order placed  CM to assist  Accepted by HH  Noncompliance with pt/ot  Encourage activity    4/5: Pending placement.    4/8: refusing SNF, will have PT/OT/ST re-evaluate    Alcoholic cirrhosis of liver without ascites (HCC)  Assessment & Plan  Patient reports history of heavy alcohol use, has not drank in few years  Will need GI follow up as outpatient  Denies previous diagnosis of liver disease  Cirrhotic appearing liver on imaging without ascites  Hep panel neg  Ammonia 26    4/5: Mild elevation of ALT, in the setting of liver disease. Patient currently not complaining of abdominal pain. If it continues to trend upwards or develops new abdominal pain we will consider re-imaging.    Venous stasis dermatitis of right lower extremity  Assessment & Plan  Does not appear infected on examination  Continue skin care per nursing protocol    Grade IV hemorrhoids  Assessment & Plan  Patient reports chronic hemorrhoids with occasional mild rectal bleeding  lidocaine gel for pain relief        Hyponatremia  Assessment & Plan  Asymptomatic hyponatremia  Hyponatremia worsening 3/22 now down to 126  3/23 stable at 129, cont current management  Likely SIADH  Free water restriction  Due to subdural hematoma will start salt supplementation    4/5: Continue salt tablets and florinef as per neurosurgery  recommendations.    Valvular cardiomyopathy (HCC)- (present on admission)  Assessment & Plan  History of bioprosthetic mitral valve replacement  Aspirin therapy alone okay, doesn't need AC    Hypotension- (present on admission)  Assessment & Plan  SBP 80s today, asymptomatic, did receive both BM meds  No s/sx sepsis  Eating some (although on I/O only 300cc) will give some IVF  Given brain injury will check cortisol/ACTH    Hypertension  Assessment & Plan  Patient on metoprolol and lisinopril  PRN medications  Monitor and make changes accordingly    Pacemaker  Assessment & Plan  History of pacemaker limiting ability to obtain MRI    Obesity (BMI 30-39.9)- (present on admission)  Assessment & Plan  Will need weight loss program on discharge  Patient counseled on lifestyle modifications  Will need close follow up as outpatient.    Benign prostatic hyperplasia without lower urinary tract symptoms- (present on admission)  Assessment & Plan  Continue Flomax  No evidence of urinary obstruction       VTE prophylaxis: SCDs

## 2021-04-11 NOTE — THERAPY
"Speech Language Pathology  Daily Treatment     Patient Name: Morales Olivier  Age:  65 y.o., Sex:  male  Medical Record #: [id number]  Today's Date: 4/11/2021     Precautions  Precautions: (P) Fall Risk  Comments: h/o seizures and Crani    Assessment    Pt seen on this date for a repeat cognitive-linguistic evaluation as pt likely d/c home tomorrow as he is refusing post-acute services. The Cognitive-Linguistic Quick Test (CLQT) was administered in full and pt received an overall composite severity rating of 3.4 which correlates with a \"mild\" cognitive impairment. Mild deficits found in memory (142), executive functioning (23), language (25), and clock drawing (10). He fell within normal limits in attention (184) and visuospatial skills (88). Medication management task presented to pt and he complete it with 33% accuracy. Pt with very poor insight into deficits and impulsivity appreciated during tasks. Pt reported he takes 10 medications at home and does not use a pill box. When asked about current living situation, he reported that he lives alone but has a friend who can help him, however, this friend does not drive. Although cognition appears to be improving from initial cognitive evaluation, continue to recommend cognitive speech therapy in the acute care setting and at post-acute facility with 24 hour supervision to ensure pt safety and sound decision making. When providing recommendations, pt adamantly refused placement, but agreed to home health speech therapy. SLP to follow.    Plan    Although cognition appears to be improving from initial cognitive evaluation, continue to recommend cognitive speech therapy in the acute care setting and at post-acute facility with 24 hour supervision to ensure pt safety and sound decision making    Continue current treatment plan.    Discharge Recommendations: (P) Recommend post-acute placement for additional speech therapy services prior to discharge home     "   Objective       04/11/21 1218   Vitals   O2 (LPM) 0   O2 Delivery Device None - Room Air   Verbal Expression   Verbal Expression / Aphasia Eval (WDL) X   Verbal Output: Phrases Within Functional Limits (6-7)   Verbal Output Functional Within Functional Limits (6-7)   Naming Moderate (3)   Auditory Comprehension   Auditory Comprehension (WDL) X   Yes / No Questions: Personal Information Within Functional Limits (6-7)   Follows One Unit Commands Within Functional Limits (6-7)   Understands Simple, Structured Conversation  Minimal (4)   Understands Complex Conversation Moderate (3)   Reading Comprehension   Reading Comprehension (WDL) X   Following Written Direction Severe (2)   Written Expression   Written Expression (WDL) X   Functional Writing: Name Within Functional Limits (6-7)   Dominant Hand Right   Cognitive-Linguistic   Cognitive-Linguistic (WDL) X   Level of Consciousness Alert   Sustained Attention Minimal (4)   Short Term Memory Minimal (4)   Immediate Memory Supervision (5)   Safety Awareness Moderate (3)   Insight into Deficits Moderate (3)   Executive Functioning / Organization Minimal (4)   Medication Management  Severe (2)   Clock Drawing Poor Planning;Disorganization   Short Term Goals   Short Term Goal # 1 NEW 4/6: Patient will consume an EC7/TN0 diet with no overt s/sx of aspiration given initial close monitoring.    Short Term Goal # 2 Pt will maintain attention to task for 2 minutes with min-mod cues   Goal Outcome # 2  Progressing as expected   Short Term Goal # 3 Patient will participate in short term memory tasks with 90% accuracy and mod cues   Goal Outcome  # 3 Progressing as expected   Short Term Goal # 4 Patient will complete simple problem solving tasks related to safety and ADLs with 90% accuracy and mod cues   Goal Outcome  # 4   (not targeted this session)

## 2021-04-11 NOTE — ASSESSMENT & PLAN NOTE
SBP 80s today, asymptomatic, did receive both BM meds  No s/sx sepsis  Eating some (although on I/O only 300cc) will give some IVF  Given brain injury will check cortisol/ACTH  - 8am cortisol low (1.1) although on decadron, will perform ACTH stim test (which you can do on decadron)

## 2021-04-11 NOTE — CARE PLAN
Problem: Safety  Goal: Will remain free from injury  Outcome: PROGRESSING AS EXPECTED  Goal: Will remain free from falls  Outcome: PROGRESSING AS EXPECTED     Problem: Pain Management  Goal: Pain level will decrease to patient's comfort goal  Outcome: PROGRESSING AS EXPECTED     Problem: Safety:  Goal: Will remain free from injury  Outcome: PROGRESSING AS EXPECTED

## 2021-04-11 NOTE — CARE PLAN
Problem: Skin Integrity  Goal: Risk for impaired skin integrity will decrease  Outcome: PROGRESSING AS EXPECTED  Note: Sacrum red and blanching.  Encouraged patient to turn Q2 hours.  Up to bathroom and on several walks.  Continue to monitor skin.       Problem: Safety  Goal: Will remain free from falls  Outcome: PROGRESSING AS EXPECTED  Note: Free of falls.  Fall precautions in place.  Patient using call light appropriately.       Problem: Pain Management  Goal: Pain level will decrease to patient's comfort goal  Outcome: PROGRESSING AS EXPECTED  Note: Pain controlled with increased pain med dose.       Problem: Mobility  Goal: Risk for activity intolerance will decrease  Outcome: PROGRESSING AS EXPECTED  Note: Up stand by assist with FWW.  Steady on feet.

## 2021-04-12 PROBLEM — R79.89 LOW SERUM CORTISOL LEVEL: Status: ACTIVE | Noted: 2021-01-01

## 2021-04-12 NOTE — THERAPY
Occupational Therapy  Daily Treatment     Patient Name: Morales Olivier  Age:  65 y.o., Sex:  male  Medical Record #: [id number]  Today's Date: 4/12/2021       Precautions: Fall Risk  Comments: hx of seizures, crani 3/25    Assessment    Pt seen for OT tx. Continues to be limited by poor safety awareness, insight and problem solving impacting ability to complete ADLs and IADLs independently. Required max cues to follow directions reading label off medication management kit. Pt w/ poor sequencing and inattention. Pt perseverating on asking where to get pill organizer from. Attempted to walk pt through directions step by step, pt nodding and verbalizing understanding but unable to teach back. Will continue to benefit from OT services while in house.     Plan    Continue current treatment plan.    DC Equipment Recommendations: Unable to determine at this time  Discharge Recommendations: Recommend post-acute placement for additional occupational therapy services prior to discharge home. However should pt decline placement pt would benefit from home health services to assist w/ medication management.        04/12/21 1101   Cognition    Cognition / Consciousness X   Safety Awareness Impaired   New Learning Impaired   Attention Impaired   Balance   Sitting Balance (Static) Good   Sitting Balance (Dynamic) Good   Standing Balance (Static) Fair -   Standing Balance (Dynamic) Fair -   Weight Shift Sitting Fair   Weight Shift Standing Fair   Bed Mobility    Supine to Sit Supervised   Sit to Supine Supervised   Scooting Supervised   Rolling Supervised   Activities of Daily Living   Grooming Supervision;Standing   Upper Body Dressing Minimal Assist   Lower Body Dressing Supervision   Toileting Supervision   Functional Mobility   Sit to Stand Supervised   Bed, Chair, Wheelchair Transfer Supervised   Toilet Transfers Supervised   Short Term Goals   Short Term Goal # 1 Pt will demo LB dressing using AE PRN w/ SPV   Goal Outcome # 1  Progressing as expected   Short Term Goal # 2 Pt will demo standing grooming w/ SPV   Goal Outcome # 2 Goal met   Short Term Goal # 3 Pt will demo toilet txf w/ SPV   Goal Outcome # 3 Goal met   Short Term Goal # 4 Pt will demo toilet hygiene w/ SPV   Goal Outcome # 4 Goal met   Anticipated Discharge Equipment and Recommendations   DC Equipment Recommendations Unable to determine at this time   Discharge Recommendations Recommend post-acute placement for additional occupational therapy services prior to discharge home

## 2021-04-12 NOTE — CARE PLAN
Problem: Skin Integrity  Goal: Risk for impaired skin integrity will decrease  Outcome: PROGRESSING AS EXPECTED     Problem: Knowledge Deficit  Goal: Knowledge of disease process/condition, treatment plan, diagnostic tests, and medications will improve  Outcome: PROGRESSING SLOWER THAN EXPECTED     Problem: Knowledge Deficit:  Goal: Knowledge of disease process/condition, treatment plan, diagnostic tests, and medications will improve  Outcome: PROGRESSING SLOWER THAN EXPECTED     Problem: Respiratory:  Goal: Respiratory status will improve  Outcome: MET     Problem: Urinary Elimination:  Goal: Ability to reestablish a normal urinary elimination pattern will improve  Outcome: MET     Problem: Urinary:  Goal: Ability to reestablish a normal urinary elimination pattern will improve  Outcome: MET

## 2021-04-12 NOTE — PROGRESS NOTES
[facility] Medicine Daily Progress Note    Date of Service  4/12/2021    Chief Complaint  65 y.o. male admitted 3/12/2021 with altered mentation.    Hospital Course  64yo PMHx ETOH, AFIb, anticoagulation with warfarin, CAD, bioprosthetic MVR, gout, HTN, PPM, DVT/PE, DM.  Admitted after EMS called (friend didn't hear from patient in 3 days) and found pt with R side deficits (had fallen out of bed).  In ED CT demonstrating L wholohemispheric SDH.  INR reversed with KCentra.  Initially treated non operatively however surveilance CT morena 3/22 showed the SDH was enlarging so taken to the OR.  Angiogram on 3/15 neg for vascular abnormalities.  On 3/25 taken to OR for L craniotomy and evacuation by Dr Hudson.  On 3/28 witnessed Sz, patient started on keppra then switched to Vimpat d/t thrombocytopenia.  Patient also started on dexamethasone (now completed wean), salt tabs (although started refusing 4/11) and Florinef.  Patient had ongoing severe thrombocytopenia.  Hematology consulted.  Negative for HIT.  Received platelet transfusions for platelets less than 40.  Patient started on IVIG on 4/4 without improvement.  Thought to be likely due to keppra and allopurinol on top of liver disease (use to drink excessively) thought to have cirrhosis, no ascites.  During heme workup did have positive SPENCER and anti-SSA/RO antibodies.  Will need referral to GI and rheumatology (placed at discharge). Neurosurgery said ok to start aspirin in 2 weeks, cardiology was consulted and did not think he needed AC for valve or afib given risks.        Interval Problem Update  - No acute overnight events  - Plt stable 80-->76  - refusing salt tabs, doesn't like how they taste, Na 132 without salt tabs for last 24 hours  - checked cortisol for lower BP last few days, 8am cortisol 1.1, on decadron wean, tried stim test but phlebotomy came too late, will have to try again 4/13 prior to dc, ACTH pending  - worked with PT/OT, practiced med mgmt which he  really struggles with, discussed risks of going home with only HH, understands risks, HH accepted patient, will have Rn to help with meds    Consultants/Specialty  Neurosurgery  Hematology  Cardiology    Code Status  Full Code    Disposition  HH vs home, had long conversation with patient regarding risks of being home without rehab including falls and even death, patient understands risks, patient is competent and has capacity to make decisions for himself    Review of Systems  Review of Systems   Constitutional: Negative for chills and fever.   HENT: Negative for nosebleeds and sore throat.    Eyes: Negative for blurred vision and double vision.   Respiratory: Negative for cough and shortness of breath.    Cardiovascular: Negative for chest pain, palpitations and leg swelling.   Gastrointestinal: Negative for abdominal pain, diarrhea, nausea and vomiting.   Genitourinary: Negative for dysuria and urgency.   Musculoskeletal: Positive for back pain.   Skin: Negative for rash.   Neurological: Positive for headaches. Negative for dizziness, seizures and loss of consciousness.        Physical Exam  Temp:  [36.3 °C (97.4 °F)-36.4 °C (97.5 °F)] 36.3 °C (97.4 °F)  Pulse:  [79-81] 80  Resp:  [18] 18  BP: (107-117)/(68-80) 117/80  SpO2:  [97 %-100 %] 98 %    Physical Exam  Vitals reviewed.   Constitutional:       General: He is not in acute distress.     Appearance: Normal appearance. He is well-developed. He is not diaphoretic.   HENT:      Head: Normocephalic and atraumatic.      Comments: L scalp incision noted.  No signs of infection     Nose: Nose normal.      Mouth/Throat:      Mouth: Mucous membranes are moist.   Eyes:      General: No scleral icterus.     Extraocular Movements: Extraocular movements intact.      Conjunctiva/sclera: Conjunctivae normal.      Pupils: Pupils are equal, round, and reactive to light.   Neck:      Vascular: No JVD.   Cardiovascular:      Rate and Rhythm: Normal rate and regular rhythm.       Heart sounds: No murmur. No gallop.    Pulmonary:      Effort: Pulmonary effort is normal. No respiratory distress.      Breath sounds: No stridor. No wheezing or rales.   Abdominal:      General: Bowel sounds are normal. There is no distension.      Palpations: Abdomen is soft.      Tenderness: There is no abdominal tenderness. There is no guarding or rebound.      Hernia: A hernia (ventral) is present.   Musculoskeletal:      Cervical back: Normal range of motion.      Right lower leg: No edema.      Left lower leg: No edema.   Skin:     General: Skin is warm and dry.      Findings: Bruising (scattered on extremities and abdomen) present. No rash.   Neurological:      General: No focal deficit present.      Mental Status: He is alert and oriented to person, place, and time.      Sensory: No sensory deficit.      Motor: No weakness.      Gait: Gait normal.   Psychiatric:         Mood and Affect: Mood normal.         Behavior: Behavior normal.         Thought Content: Thought content normal.         Fluids    Intake/Output Summary (Last 24 hours) at 4/12/2021 1650  Last data filed at 4/11/2021 1825  Gross per 24 hour   Intake 360 ml   Output --   Net 360 ml       Laboratory  Recent Labs     04/10/21  0229 04/11/21  0157 04/12/21  0755   WBC 6.4 5.5 3.8*   RBC 3.91* 3.74* 3.65*   HEMOGLOBIN 13.0* 12.1* 11.7*   HEMATOCRIT 39.5* 37.8* 37.3*   .0* 101.1* 102.2*   MCH 33.2* 32.4 32.1   MCHC 32.9* 32.0* 31.4*   RDW 62.0* 62.2* 64.3*   PLATELETCT 71* 81* 76*   MPV 11.0 10.8 11.3     Recent Labs     04/11/21  0157 04/12/21  0755   SODIUM 135 132*   POTASSIUM 4.2 3.9   CHLORIDE 102 98   CO2 25 28   GLUCOSE 114* 169*   BUN 26* 23*   CREATININE 0.71 0.81   CALCIUM 8.5 8.6                   Imaging  CT-LSPINE W/O PLUS RECONS   Final Result      1.  No evidence of lumbar spine fracture.      2.  Multilevel degenerative disc disease and facet degeneration. There are varying degrees of central canal and neuroforaminal  narrowing as specifically described above.      3.  Scoliosis.      CT-HEAD W/O   Final Result      Slight interval decrease in size and density of extra-axial hemorrhage deep to craniotomy flap.      Decreased attenuation along the inferior aspect left frontal lobe could represent encephalomalacia or low attenuation subdural fluid.      No new hemorrhage.      CT-HEAD W/O   Final Result      No significant interval change from one day prior.      CT-HEAD W/O   Final Result      1.  There is overall stable residual left frontal subdural and subarachnoid hemorrhage.   2.  There is left sided hemispheric edema with 3 mm of left-to-right midline shift, previously 4 mm.   3.  There is no new hemorrhage.   4.  There has been slight interval resorption of pneumocephalus with otherwise stable left craniotomy changes.   5.  Focal hypodensity in the inferior left frontal lobe again seen most consistent with an area of evolving ischemia.   6.  Underlying atrophy and white matter disease again noted.      CT-HEAD W/O   Final Result      1.  Stable sequela of left craniotomy, with stable left-sided subarachnoid and subdural hemorrhage.   2.  Residual 4 mm left-to-right midline shift.   3.  No CT evidence of new hemorrhage or large vascular territory infarct.      CT-HEAD W/O   Final Result      Interval left craniotomy with overall decrease in volume of subdural hematoma with some postsurgical extra-axial hemorrhage deep to the craniotomy flap with associated subarachnoid hemorrhage.      Persistent mass effect and midline shift from left-to-right of approximately 5 mm.      CT-HEAD W/O   Final Result      Left extra-axial hematoma is again noted. Subacute blood closer to the vertex along the left frontoparietal lobes is mildly increased compared to prior measuring 1.2 compared to 1 cm.      Mass effect is again noted with 4.6 mm midline shift to the right, slightly increased compared to prior.      CT-ABDOMEN-PELVIS WITH    Final Result      1.  Ascites is no longer identified.      2.  Liver surface is mildly nodular consistent with cirrhosis.      3.  Splenomegaly is again noted. Punctate focus of decreased attenuation in the spleen could be due to normal differential enhancement although small hemangioma is also possible.      4.  Ventral abdominal wall hernia containing abdominal fat is again identified.      5.  Consolidation and volume loss is noted posteriorly in the right lower lobe which could indicate pneumonia.      6.  Diverticulosis.         DX-CHEST-PORTABLE (1 VIEW)   Final Result      Small right pleural effusion.      Right basilar opacities may represent atelectasis or scarring.         CT-HEAD W/O   Final Result      1.  Stable size of the extra-axial hemorrhage overlying the left cerebral hemisphere with sulci effacement and approximately 3 mm of left-to-right midline shift.         IR-CAROTID-CEREBRAL BILATERAL   Final Result         1.  Normal cerebral arteriogram.      2.  No evidence of arteriovenous malformation or intracranial aneurysm formation.      3.  No evidence of atherosclerotic disease in the right common femoral artery.      CT-CTA HEAD WITH & W/O-POST PROCESS   Final Result      No evidence of occlusive lesion or aneurysm.      Multiple tortuous small vessels along the anterior-inferior aspect of the left temporal lobe could represent some type of vascular malformation.      No significant interval change in left-sided holohemispheric subdural hematoma extending along the falx and layering over the tentorium.      CT-CTA NECK WITH & W/O-POST PROCESSING   Final Result      CT angiogram of the neck within normal limits.      CT-HEAD W/O   Final Result         1. Grossly unchanged left hemispheric subdural hematoma, most in the left frontal and temporal region.   2. No new intracranial hemorrhage.                  CT-HEAD W/O   Final Result      1.  Stable large LEFT hemispheric subdural hematoma with  associated mass effect and midline shift.   2.  No significant change from prior exam obtained 2 hours earlier.      CT-HEAD W/O   Final Result      Large LEFT holohemispheric subdural hematoma with associated mass effect and 4 mm LEFT to RIGHT midline shift.      These findings were discussed with EFREM FITZGERALD on 3/12/2021 11:40 AM.      DX-CHEST-PORTABLE (1 VIEW)   Final Result      1.  Hypoinflation, improved from prior.   2.  Minimal RIGHT lung base atelectasis or scar.   3.  Minimal RIGHT pleural effusion.           Assessment/Plan  * Thrombocytopenia (HCC)  Assessment & Plan  Unsure etiology  HIT negative  SPEP negative  Likely from Med: Keppra vs allopurinol?  Heme consulted (signed off 4/10): s/lp IVIG without significant improvement, unlikely ITP or autoimmune process. RF +, lupus anticoagulant negative, SPENCER 1:320 speckled, anti-ds negative, anti RO antibodies positive  - last plt transfusion 4/7, with improvement in plt 42-->60 (an hour after transfusion) -->66 next morning  -plt stable 60-->71-->81-->76  - cleared for dc and aspirin from heme         Subdural hematoma, acute (HCC)  Assessment & Plan  Continues to have headache  Neurosurgery following and recommending continuing steroids and Keppra for seizure prophylaxis. On steroid taper  Repeat CT scan 3/22: 1.2cm extra axial hematoma (from 1cm) with 4.6mm midline shift to right slightly greater compared to prior imaging  Neurosurgery following  PT/OT/SLP following, continue therapy    S/p craniotomy 3/25  Cont Neuro checks  BP control  Sz prophylaxis  PT/OT/SLP    3/30: As per neurosurgery, patient able to be discharged to skilled nursing facility when ready.  Staples and sutures out 2 weeks postop, follow-up with [facility] neurosurgery group in 4 weeks with a new head CT as per neurosurgery note, their office will arrange it.  Neurosurgery has also increased salt tablets to 2 g 3 times daily with Florinef.    3/31: Patient seems more lethargic this  morning.  Neurosurgery repeated CT scan which did not reveal acute abnormality.  Neurosurgery would like to have an MRI of the brain, patient with pacemaker we will inquire if it is compatible with MRI machine.  Neurosurgery also recommends starting the patient on Decadron 4 mg every 6 hours for now.    4/2: We have started the tapering of Decadron as per neurosurgery's recommendation.    4/8: repeat CT Head showed slight interval decrease in size and density of extra-axial hemorrhage deep to craniotomy flap. Decreased attenuation along the inferior aspect left frontal lobe could represent encephalomalacia or low attenuation subdural fluid.     History of prosthetic mitral valve  Assessment & Plan  With paroxysmal atrial fibrillation  Anticoagulation reversed given subdural hematoma  Consulted cardiology: okay with just aspirin (per neurosurgery, can restart in 2 weeks)    COPD (chronic obstructive pulmonary disease) (HCC)- (present on admission)  Assessment & Plan  Patient not in acute exacerbation or requiring oxygen  O2/RT protocols    Gout- (present on admission)  Assessment & Plan  Due to ongoing thrombocytopenia and as per hematology's recommendation we will hold allopurinol for now.  Patient currently receiving steroids.    Type 2 diabetes mellitus without complication, without long-term current use of insulin (HCC)- (present on admission)  Assessment & Plan  Patient with hyperglycemia worsened by steroids  A1C 5.9  lantus 15 units  SSI  Well controled on current regimen however will likely need to cut back lantus as decadron is tapered off    3/30: We will stop lantus, and continue with ISS. We will monitor and make changes accordingly. Hypoglycemia protocol in place.    3/31: As per neurosurgery we have started the patient on Decadron 4 mg every 6 hours, we will monitor glucose closely if needed we will restart Lantus.    4/5: Patient currently on decadron tapering. We will continue ISS and hypoglycemia  protocol for now. Monitor and make changes accordingly.        Atrial fibrillation (HCC)  Assessment & Plan  Holding anticoagulation in setting of acute SDH  Rate control with metoprolol  Cont Tele monitoring  Cardiology recommending NOT putting patient back on AC given bleeding risk      Pancytopenia (HCC)  Assessment & Plan  Has had normocytic anemia since 2019  Plt and WBC dropped during this admission  Likely d/t liver disease  Not neutropenic  CTM      Seizure (HCC)  Assessment & Plan  Secondary to SDH  Witnessed event: pt started with aphasia then gaze went upward and fixed with head tremor.  Pt was then unresponsive.  Episode lasting approx 1 min with rapid return to baseline   2 Szs on 3/28 none since  Cont Keppra 1g BID    4/1: Due to ongoing thrombocytopenia as per hematology Keppra can sometimes cause low platelets, so we have switched to Vimpat as per neurology's recommendation.    Cognitive deficits  Assessment & Plan  Evaluated by SLP and found to have cognitive deficits, recommending SNF  Cognitive deficits likely secondary to SDH  Discussed with friend, he is not at previous baseline    3/23 Will likely need SNF placement, order placed  CM to assist  Accepted by HH  Noncompliance with pt/ot  Encourage activity    4/5: Pending placement.    4/8: refusing SNF, will have PT/OT/ST re-evaluate    Alcoholic cirrhosis of liver without ascites (HCC)  Assessment & Plan  Patient reports history of heavy alcohol use, has not drank in few years  Will need GI follow up as outpatient  Denies previous diagnosis of liver disease  Cirrhotic appearing liver on imaging without ascites  Hep panel neg  Ammonia 26    4/5: Mild elevation of ALT, in the setting of liver disease. Patient currently not complaining of abdominal pain. If it continues to trend upwards or develops new abdominal pain we will consider re-imaging.    Venous stasis dermatitis of right lower extremity  Assessment & Plan  Does not appear infected on  examination  Continue skin care per nursing protocol    Grade IV hemorrhoids  Assessment & Plan  Patient reports chronic hemorrhoids with occasional mild rectal bleeding  lidocaine gel for pain relief        Hyponatremia  Assessment & Plan  Asymptomatic hyponatremia  Hyponatremia worsening 3/22 now down to 126  3/23 stable at 129, cont current management  Likely SIADH  Free water restriction  Due to subdural hematoma will start salt supplementation    4/5: Continue salt tablets and florinef as per neurosurgery recommendations.  4/12: refusing salt tabs, will check Na in am now that he's been off for > 24 hours    Valvular cardiomyopathy (HCC)- (present on admission)  Assessment & Plan  History of bioprosthetic mitral valve replacement  Aspirin therapy alone okay, doesn't need AC    Hypotension- (present on admission)  Assessment & Plan  SBP 80s today, asymptomatic, did receive both BM meds  No s/sx sepsis  Eating some (although on I/O only 300cc) will give some IVF  Given brain injury will check cortisol/ACTH  - 8am cortisol low (1.1) although on decadron, will perform ACTH stim test (which you can do on decadron)    Hypertension  Assessment & Plan  Patient on metoprolol and lisinopril  PRN medications  Monitor and make changes accordingly    Pacemaker  Assessment & Plan  History of pacemaker limiting ability to obtain MRI    Obesity (BMI 30-39.9)- (present on admission)  Assessment & Plan  Will need weight loss program on discharge  Patient counseled on lifestyle modifications  Will need close follow up as outpatient.    Benign prostatic hyperplasia without lower urinary tract symptoms- (present on admission)  Assessment & Plan  Continue Flomax  No evidence of urinary obstruction       VTE prophylaxis: SCDs

## 2021-04-12 NOTE — DISCHARGE PLANNING
Agency/Facility Name: [facility]   Outcome: Vmail from [facility] stated that pt was accepted.

## 2021-04-12 NOTE — THERAPY
"Physical Therapy   Daily Treatment     Patient Name: Morales Olivier  Age:  65 y.o., Sex:  male  Medical Record #: [id number]  Today's Date: 4/12/2021     Precautions: Fall Risk    Assessment    Pt found sitting upright in the bed. Pt is now functioning @ supervised->indep level w/his bed mobility, functional transfers, and his amb efforts w/FWW. Pt is now ambulating hallway distances, ongoing c/o LBP. Pt w/poor insight of his cognitive deficits. Pt has met all PT goals, no further skilled PT needs.Please see ST/OT recommendations for post acute, pt w/more cognitive deficits.From a PT standpoint, pt mobilizing @ a level to d/c home w/HHS. Do recommend SW provide info on Life Alert.  No further skilled PT goals, PT will be available for d/c needs only.   Nrsg please cont to ambulate pt hallway distances throughout the day.     Plan    DC Equipment Recommendations: Pt owns a FWW  .     Objective       04/12/21 1031   Other Treatments   Other Treatments Provided Ongoing conversation regarding safety when home, recommending Lift Alert. Explaining to purpose of the Life Alert. Pt initially refused but  w/further conversation was willing to think about it.    Balance   Sitting Balance (Static) Good   Sitting Balance (Dynamic) Good   Standing Balance (Static) Fair -   Standing Balance (Dynamic) Fair -   Weight Shift Sitting Fair   Weight Shift Standing Fair   Gait Analysis   Gait Level Of Assist Supervised   Assistive Device Front Wheel Walker   Distance (Feet) 175   # of Times Distance was Traveled 1   Deviation Bradykinetic   Skilled Intervention Verbal Cuing   Comments Improvement w/his amb efforts, still needing cueing to stand more upright. Recommend pt cont to use the FWW @ d/c.    Bed Mobility    Supine to Sit Independent   Sit to Supine Independent   Scooting Independent   Rolling Independent   Comments HOB flat and no railing. Pt wants a railing @ home on his bed, \"so I don't roll out\".    Functional Mobility "   Sit to Stand Supervised   Bed, Chair, Wheelchair Transfer Supervised   Toilet Transfers Supervised   How much difficulty does the patient currently have...   Turning over in bed (including adjusting bedclothes, sheets and blankets)? 4   Sitting down on and standing up from a chair with arms (e.g., wheelchair, bedside commode, etc.) 3   Moving from lying on back to sitting on the side of the bed? 3   How much help from another person does the patient currently need...   Moving to and from a bed to a chair (including a wheelchair)? 3   Need to walk in a hospital room? 3   Climbing 3-5 steps with a railing? 3   6 clicks Mobility Score 19   Short Term Goals    Short Term Goal # 2 Patient will move sitting<>standing with supervision within 6tx in order to initiate gait and transfers   Goal Outcome # 2 Goal met   Short Term Goal # 3 Pt will ambulate 150ft with FWW and SPV in 6 visits to increase independence   Goal Outcome # 3 Goal met

## 2021-04-12 NOTE — TELEPHONE ENCOUNTER
[facility] Schedulers Pool; OMERO Ward, please refer this patient to EP for watchman device consideration.     General schedulers, please get him in with a nurse practitioner in 3 weeks.     Thank you   ------------------------------------------------------------------------------------------------------    Referral placed.

## 2021-04-13 NOTE — PROGRESS NOTES
"[facility]      Pt is over due for PT/INR for warfarin monitoring.  Upon chart review of patient and recent hospitalization, found that patient was taken off of anticoagulation due to subdural hematoma and other chronic conditions.     Per discharge notes \"Neurosurgery said ok to start aspirin in 2 weeks, cardiology was consulted and did not think he needed AC for valve or afib given risks. \"     Patient will be discharged from clinic. Called & LVM for patient to inform him. Clinic phone number left for any questions or concerns.    Pattie Calderon  PharmD      "

## 2021-04-13 NOTE — DISCHARGE SUMMARY
Discharge Summary    CHIEF COMPLAINT ON ADMISSION  Chief Complaint   Patient presents with   • ALOC   • Possible Stroke   • Headache       Reason for Admission  EMS     Admission Date  3/12/2021    CODE STATUS  Full Code    HPI & HOSPITAL COURSE    64yo PMHx ETOH, AFIb, anticoagulation with warfarin, CAD, bioprosthetic MVR, gout, HTN, PPM, DVT/PE, DM.  Admitted after EMS called (friend didn't hear from patient in 3 days) and found pt with R side deficits (had fallen out of bed).  In ED CT demonstrating L wholohemispheric SDH.  INR reversed with KCentra.  Initially treated non operatively however surveilance CT morena 3/22 showed the SDH was enlarging so taken to the OR.  Angiogram on 3/15 neg for vascular abnormalities.  On 3/25 taken to OR for L craniotomy and evacuation by Dr Hudson.  On 3/28 witnessed Sz, patient started on keppra then switched to Vimpat d/t thrombocytopenia.  Patient also started on dexamethasone (now completed wean), salt tabs (although started refusing 4/11) and Florinef.  Patient had ongoing severe thrombocytopenia.  Hematology consulted.  Negative for HIT.  Received platelet transfusions for platelets less than 40.  Patient started on IVIG on 4/4 without improvement.  Thought to be likely due to keppra and allopurinol on top of liver disease (use to drink excessively) thought to have cirrhosis, no ascites.  During heme workup did have positive SPENCER and anti-SSA/RO antibodies.  Will need referral to GI and rheumatology (placed at discharge). Neurosurgery said ok to start aspirin in 2 weeks, cardiology was consulted and did not think he needed AC for valve or afib given risks.      Did have some low blood pressures (SBP 80s) asymptomatic, cortisol was checked at low (1.1) although on decadron wean.  Performed ACTH stim test which showed persistently low cortisol.  Patient was started on HC 20mg qam, 10mg nightly, urgent endocrine referral made.     PT/OT/ST recommended post-acute, patient refused,  does have capacity.  Had long conversation about risks of going home including falls, medication errors and even death.  Patient understands.  HH was ordered which will help with meds along with PT/OT/ST.        Therefore, he is discharged in guarded and stable condition to home with organized home healthcare and close outpatient follow-up.    The patient met 2-midnight criteria for an inpatient stay at the time of discharge.    Discharge Date  4/13/21    FOLLOW UP ITEMS POST DISCHARGE  Neurology follow up (epilepsy clinic)  Cardiology follow up  PCP follow up: monitor CBC (plt < 50 refer to hematology), LFTs, sodium levels and sugars, can restart aspirin in 2 weeks, holding AC and allopurinol  GI referral for transaminitis  Rheum referral for abnormal ab testing  Endocrine referral for low cortisol    DISCHARGE DIAGNOSES  Principal Problem:    Thrombocytopenia (HCC) POA: Unknown  Active Problems:    Subdural hematoma, acute (HCC) POA: Unknown    Atrial fibrillation (HCC) POA: Unknown    Type 2 diabetes mellitus without complication, without long-term current use of insulin (HCC) POA: Yes    Gout POA: Yes    COPD (chronic obstructive pulmonary disease) (HCC) POA: Yes    History of prosthetic mitral valve POA: Unknown    Hypertension POA: Unknown    Hypotension POA: Yes    Valvular cardiomyopathy (HCC) POA: Yes    Hyponatremia POA: No    Grade IV hemorrhoids POA: Unknown    Venous stasis dermatitis of right lower extremity POA: Unknown    Alcoholic cirrhosis of liver without ascites (HCC) POA: Unknown    Cognitive deficits POA: Unknown    Seizure (HCC) POA: Unknown    Pancytopenia (HCC) POA: Unknown    Low serum cortisol level (HCC) POA: Unknown    Benign prostatic hyperplasia without lower urinary tract symptoms POA: Yes    Obesity (BMI 30-39.9) POA: Yes    Pacemaker POA: Unknown  Resolved Problems:    Dehydration POA: Yes    Leukemoid reaction POA: Unknown    Generalized abdominal pain POA: Unknown      FOLLOW  UP  Future Appointments   Date Time Provider Department Center   4/19/2021  7:40 AM Suhail Guido M.D. [facility] None   4/20/2021  9:40 AM Sumanth Yancey M.D. [facility] None     Azael Kingston M.D.  [address] [facility] [address]  [phone]  Go on 4/19/2021  Please go to your primary care office for your hospital follow up appointment at 1:45pm on 04/19/21. Thank you.    [facility]  [address]  [facility] [address]  [phone]          MEDICATIONS ON DISCHARGE     Medication List      START taking these medications      Instructions   fludrocortisone 0.1 MG Tabs  Commonly known as: FLORINEF   Take 1 tablet by mouth every morning.  Dose: 0.1 mg     * hydrocortisone 10 MG Tabs  Commonly known as: CORTEF   Take 1 tablet by mouth every evening.  Dose: 10 mg     * hydrocortisone 20 MG Tabs  Commonly known as: CORTEF   Take 1 tablet by mouth every day.  Dose: 20 mg     lacosamide 100 MG Tabs tablet  Commonly known as: VIMPAT   Take 1 tablet by mouth 2 times a day for 30 days.  Dose: 100 mg     oxyCODONE immediate release 10 MG immediate release tablet  Commonly known as: ROXICODONE   Take 1 tablet by mouth every 4 hours as needed for Mod or Severe Pain for up to 3 days.  Dose: 10 mg     senna-docusate 8.6-50 MG Tabs  Commonly known as: PERICOLACE or SENOKOT S   Take 2 Tablets by mouth 2 times a day.  Dose: 2 tablet     sodium chloride 1 GM Tabs  Commonly known as: SALT   Take 2 Tablets by mouth 3 times a day with meals.  Dose: 2 g     vitamin D3 (cholecalciferol) 400 UNIT Tabs tablet   Take 2 Tablets by mouth every day.  Dose: 800 Units         * This list has 2 medication(s) that are the same as other medications prescribed for you. Read the directions carefully, and ask your doctor or other care provider to review them with you.            CHANGE how you take these medications      Instructions   metoprolol SR 25 MG Tb24  What changed:   · medication strength  · how much to  take  Commonly known as: TOPROL XL   Take 1 tablet by mouth every morning.  Dose: 25 mg        CONTINUE taking these medications      Instructions   cetirizine 10 MG Tabs  Commonly known as: ZYRTEC   Take 10 mg by mouth every morning.  Dose: 10 mg     hydrOXYzine HCl 25 MG Tabs  Commonly known as: ATARAX   Take 1 Tab by mouth 3 times a day as needed for Itching.  Dose: 25 mg     metFORMIN 500 MG Tabs  Commonly known as: GLUCOPHAGE   Take 500 mg by mouth 2 times a day, with meals.  Dose: 500 mg     multivitamin Tabs   Take 1 tablet by mouth every day.  Dose: 1 tablet     Pain Pump Janet  Commonly known as: patient supplied   Inject  as directed continuous.     tamsulosin 0.4 MG capsule  Commonly known as: FLOMAX   Doctor's comments: PT TO SEE PCP FOR FURTHER REFILLS  TAKE 1 CAPSULE BY MOUTH EVERY DAY        STOP taking these medications    allopurinol 100 MG Tabs  Commonly known as: ZYLOPRIM     benzonatate 100 MG Caps  Commonly known as: TESSALON     lisinopril 2.5 MG Tabs  Commonly known as: PRINIVIL     warfarin 3 MG Tabs  Commonly known as: COUMADIN            Allergies  No Active Allergies    DIET  Orders Placed This Encounter   Procedures   • Diet Order Diet: Level 7 - Easy to Chew; Liquid level: Level 0 - Thin; Second Modifier: (optional): Consistent CHO (Diabetic)     Standing Status:   Standing     Number of Occurrences:   1     Order Specific Question:   Diet:     Answer:   Level 7 - Easy to Chew [22]     Order Specific Question:   Liquid level     Answer:   Level 0 - Thin     Order Specific Question:   Second Modifier: (optional)     Answer:   Consistent CHO (Diabetic) [4]       ACTIVITY  As tolerated.  Weight bearing as tolerated    CONSULTATIONS  Neurosurgery  Neurology  Hematology  Cardiology    PROCEDURES  3/25/2021 procedure(s):  CRANIOTOMY - FOR SUBDURAL. - Wound Class: Clean     Surgeon(s):  Naveed Hudson M.D.    LABORATORY  Lab Results   Component Value Date    SODIUM 136 04/13/2021    POTASSIUM 4.2  04/13/2021    CHLORIDE 104 04/13/2021    CO2 28 04/13/2021    GLUCOSE 104 (H) 04/13/2021    BUN 24 (H) 04/13/2021    CREATININE 0.76 04/13/2021    CREATININE 1.4 04/27/2009        Lab Results   Component Value Date    WBC 3.8 (L) 04/12/2021    HEMOGLOBIN 11.7 (L) 04/12/2021    HEMATOCRIT 37.3 (L) 04/12/2021    PLATELETCT 76 (L) 04/12/2021      Recent Labs     04/11/21  0157   ASTSGOT 56*   ALTSGPT 145*   TBILIRUBIN 0.4   GLOBULIN 2.9     Imaging  CT-LSPINE W/O PLUS RECONS   Final Result       1.  No evidence of lumbar spine fracture.       2.  Multilevel degenerative disc disease and facet degeneration. There are varying degrees of central canal and neuroforaminal narrowing as specifically described above.       3.  Scoliosis.       CT-HEAD W/O   Final Result       Slight interval decrease in size and density of extra-axial hemorrhage deep to craniotomy flap.       Decreased attenuation along the inferior aspect left frontal lobe could represent encephalomalacia or low attenuation subdural fluid.       No new hemorrhage.       CT-HEAD W/O   Final Result       No significant interval change from one day prior.       CT-HEAD W/O   Final Result       1.  There is overall stable residual left frontal subdural and subarachnoid hemorrhage.   2.  There is left sided hemispheric edema with 3 mm of left-to-right midline shift, previously 4 mm.   3.  There is no new hemorrhage.   4.  There has been slight interval resorption of pneumocephalus with otherwise stable left craniotomy changes.   5.  Focal hypodensity in the inferior left frontal lobe again seen most consistent with an area of evolving ischemia.   6.  Underlying atrophy and white matter disease again noted.       CT-HEAD W/O   Final Result       1.  Stable sequela of left craniotomy, with stable left-sided subarachnoid and subdural hemorrhage.   2.  Residual 4 mm left-to-right midline shift.   3.  No CT evidence of new hemorrhage or large vascular territory  infarct.       CT-HEAD W/O   Final Result       Interval left craniotomy with overall decrease in volume of subdural hematoma with some postsurgical extra-axial hemorrhage deep to the craniotomy flap with associated subarachnoid hemorrhage.       Persistent mass effect and midline shift from left-to-right of approximately 5 mm.       CT-HEAD W/O   Final Result       Left extra-axial hematoma is again noted. Subacute blood closer to the vertex along the left frontoparietal lobes is mildly increased compared to prior measuring 1.2 compared to 1 cm.       Mass effect is again noted with 4.6 mm midline shift to the right, slightly increased compared to prior.       CT-ABDOMEN-PELVIS WITH   Final Result       1.  Ascites is no longer identified.       2.  Liver surface is mildly nodular consistent with cirrhosis.       3.  Splenomegaly is again noted. Punctate focus of decreased attenuation in the spleen could be due to normal differential enhancement although small hemangioma is also possible.       4.  Ventral abdominal wall hernia containing abdominal fat is again identified.       5.  Consolidation and volume loss is noted posteriorly in the right lower lobe which could indicate pneumonia.       6.  Diverticulosis.           DX-CHEST-PORTABLE (1 VIEW)   Final Result       Small right pleural effusion.       Right basilar opacities may represent atelectasis or scarring.           CT-HEAD W/O   Final Result       1.  Stable size of the extra-axial hemorrhage overlying the left cerebral hemisphere with sulci effacement and approximately 3 mm of left-to-right midline shift.           IR-CAROTID-CEREBRAL BILATERAL   Final Result           1.  Normal cerebral arteriogram.       2.  No evidence of arteriovenous malformation or intracranial aneurysm formation.       3.  No evidence of atherosclerotic disease in the right common femoral artery.       CT-CTA HEAD WITH & W/O-POST PROCESS   Final Result       No evidence of  occlusive lesion or aneurysm.       Multiple tortuous small vessels along the anterior-inferior aspect of the left temporal lobe could represent some type of vascular malformation.       No significant interval change in left-sided holohemispheric subdural hematoma extending along the falx and layering over the tentorium.       CT-CTA NECK WITH & W/O-POST PROCESSING   Final Result       CT angiogram of the neck within normal limits.       CT-HEAD W/O   Final Result           1. Grossly unchanged left hemispheric subdural hematoma, most in the left frontal and temporal region.   2. No new intracranial hemorrhage.                       CT-HEAD W/O   Final Result       1.  Stable large LEFT hemispheric subdural hematoma with associated mass effect and midline shift.   2.  No significant change from prior exam obtained 2 hours earlier.       CT-HEAD W/O   Final Result       Large LEFT holohemispheric subdural hematoma with associated mass effect and 4 mm LEFT to RIGHT midline shift.       These findings were discussed with EFREM FITZGERALD on 3/12/2021 11:40 AM.       Total time of the discharge process exceeds 34 minutes.

## 2021-04-13 NOTE — PROGRESS NOTES
Discharge orders acknowledged, Pt aware and compliant with new orders, D/C teaching completed, Pt able to verbalize needs and complaint with discharge orders. Medication given to pt with explanation. IV removed.    When pt arrived pt AOx4 able to make needs known.  Chronic back pain (baseline per pt)  Pt complain of nausea and dizziness.    Last FSBS 94 @0613- gave pt orange juice and a boxed lunch. Bp 91/57- 80  Notified Dr De Jesus and pt floor nurse to update.    0947 O2 95- 82/52- 80  Dr De Jesus aware and okay for pt to be discharged home.

## 2021-04-13 NOTE — DISCHARGE PLANNING
Anticipated Discharge Disposition: Home with HH    Action: Discussed in IDT rounds; pt accepted with [facility], awaiting medical clearance.    Barriers to Discharge: Medical clearance    Plan: Care coordination will remain available to assist with discharge needs.

## 2021-04-13 NOTE — DISCHARGE INSTRUCTIONS
Discharge Instructions per Linda Benavides M.D.    Mr. Olivier,    You were admitted after a fall and found to have a bleed in your brain requiring surgery.  You developed seizures and required seizure medication (Vimpat).  You required some new medications described below:  1. Salt tabs three times per day with meals to help with salt levels   2. Florinef daily (to help with your blood pressure and salt levels)  3. Hydrocortisone 20mg every morning, 10mg at night  4. Vimpat twice daily (seizure medicine)  5. Vit D supplementation daily (your vitamin D levels were low)  6. Metoprolol 25mg daily (decreased from 50mg daily you were previously taking) this is for your heart rate and blood pressure  7. Oxycodone 10mg every 4 hours as needed for severe pain.  Do NOT take this pain medication with alcohol or other sedating drugs.  Do NOT drive or operate heavy machinery while taking this medication.  Narcotics can make you constipated so if taking please take with stool softner.  - Do NOT take aspirin or NSAIDs for pain given head bleed and low platelets, limit tylenol given elevated liver tests (can take 650mg every 6 hours as needed)    You can restart your baby aspirin (81mg) daily in 2 weeks (4/26/21), DO NOT START BEFORE THIS DATE    Your platelets were low requiring transfusions.  You need to see your primary care physician to monitor your levels within 1 week.  You also need the following referrals/follow-up:  1. Neurology: seizure (epilepsy) clinic  2. Cardiology  3. GI referral (your liver tests are elevated and need to be monitored)  4. Rheumatology (some of your labs were abnormal indicating possible autoimmune disease)  5. Endocrine referral for low cortisol      Return to ER if you develop worsening headaches, dizziness/lightheadedness, new neurological changes, difficulty speaking or swallowing, chest pain, shortness of breath, difficulty taking care of yourself, falls, fevers, or other concerning  symptoms.         Discharge Instructions    Discharged to home by taxi with self. Discharged via wheelchair, hospital escort: Yes.  Special equipment needed: Not Applicable    Be sure to schedule a follow-up appointment with your primary care doctor or any specialists as instructed.     Discharge Plan:   Diet Plan: Discussed  Activity Level: Discussed  Confirmed Follow up Appointment: Patient to Call and Schedule Appointment  Confirmed Symptoms Management: Discussed  Medication Reconciliation Updated: Yes  Influenza Vaccine Indication: Patient Refuses    I understand that a diet low in cholesterol, fat, and sodium is recommended for good health. Unless I have been given specific instructions below for another diet, I accept this instruction as my diet prescription.   Other diet: Diabetic     Special Instructions: None    · Is patient discharged on Warfarin / Coumadin?   No     Depression / Suicide Risk    As you are discharged from this [facility] facility, it is important to learn how to keep safe from harming yourself.    Recognize the warning signs:  · Abrupt changes in personality, positive or negative- including increase in energy   · Giving away possessions  · Change in eating patterns- significant weight changes-  positive or negative  · Change in sleeping patterns- unable to sleep or sleeping all the time   · Unwillingness or inability to communicate  · Depression  · Unusual sadness, discouragement and loneliness  · Talk of wanting to die  · Neglect of personal appearance   · Rebelliousness- reckless behavior  · Withdrawal from people/activities they love  · Confusion- inability to concentrate     If you or a loved one observes any of these behaviors or has concerns about self-harm, here's what you can do:  · Talk about it- your feelings and reasons for harming yourself  · Remove any means that you might use to hurt yourself (examples: pills, rope, extension cords, firearm)  · Get professional help from  the community (Mental Health, Substance Abuse, psychological counseling)  · Do not be alone:Call your Safe Contact- someone whom you trust who will be there for you.  · Call your local CRISIS HOTLINE 329-5689 or 108-698-3463  · Call your local Children's Mobile Crisis Response Team [place] (841) 523-1765 or www.[employer]  · Call the toll free National Suicide Prevention Hotlines   · National Suicide Prevention Lifeline 380-171-QZOI (3629)  · National Hope Line Network 800-SUICIDE (718-0721)

## 2021-04-13 NOTE — DISCHARGE PLANNING
Meds-to-Beds: Discharge prescription orders listed below delivered to patient's bedside. OSWALD Perez notified. Patient counseled. Patient elected to have co-payment billed to patient account.       Morales Olivier   Home Medication Instructions MICHELLE:[id number]    Printed on:04/13/21 1042   Medication Information                      oxyCODONE immediate release (ROXICODONE) 10 MG immediate release tablet  Take 1 tablet by mouth every 4 hours as needed for Mod or Severe Pain for up to 3 days.                 Soraida Nice, PharmD

## 2021-04-14 PROBLEM — D64.9 ANEMIA: Status: ACTIVE | Noted: 2021-01-01

## 2021-04-14 PROBLEM — R62.7 FAILURE TO THRIVE IN ADULT: Status: ACTIVE | Noted: 2021-01-01

## 2021-04-14 NOTE — ED PROVIDER NOTES
ED Provider Note    CHIEF COMPLAINT  Chief Complaint   Patient presents with   • Failure to Thrive     Brought in by EMS from home.  DC from [facility] yesterday after a brain bleed.  Found out he is unable to care for himself at home.  Hurts all over.  Needs help-unable to ambulate       HPI  Morales Olivier is a 65 y.o. male with recent SDH status post craniotomy, A. fib, COPD, coronary artery disease, hypertension, and PE here due to inability to care for self.  Patient states that he was unable to walk secondary to generalized weakness.  He reports pain all over his body and mainly in the low back area. No falls since being home and no current anticoagulation.    Patient denies headache, nausea, vomiting, diarrhea, abdominal pain, injuries.      ALLERGIES  No Known Allergies    CURRENT MEDICATIONS  Home Medications     Reviewed by Richie Beckwith (Pharmacy Tech) on 04/14/21 at 1528  Med List Status: Complete   Medication Last Dose Status   cetirizine (ZYRTEC) 10 MG Tab unk Active   fludrocortisone (FLORINEF) 0.1 MG Tab not started Active   hydrocortisone (CORTEF) 10 MG Tab not started Active   hydrocortisone (CORTEF) 20 MG Tab not started Active   hydrOXYzine HCl (ATARAX) 25 MG Tab unk Active   lacosamide (VIMPAT) 100 MG Tab tablet not started Active   metFORMIN (GLUCOPHAGE) 500 MG Tab unk Active   metoprolol SR (TOPROL XL) 25 MG TABLET SR 24 HR not started Active   multivitamin (THERAGRAN) Tab unk Active   oxyCODONE immediate release (ROXICODONE) 10 MG immediate release tablet 4/13/2021 Active   senna-docusate (PERICOLACE OR SENOKOT S) 8.6-50 MG Tab not started Active   sodium chloride (SALT) 1 GM Tab not started Active   tamsulosin (FLOMAX) 0.4 MG capsule unk Active   vitamin D3, cholecalciferol, 400 UNIT Tab tablet not started Active                PAST MEDICAL HISTORY   has a past medical history of Atrial fibrillation (HCC), Back pain, Bronchitis, CAD (coronary artery disease), COPD (chronic obstructive  "pulmonary disease) ([facility]), Dehydration (10/22/2019), Gout, Heart valve disease, Hypertension, Kidney stone, Obesity, Open wound (2009), Pacemaker, Personal history of venous thrombosis and embolism (), PVC (premature ventricular contraction) (2019), Renal disorder, and Type II or unspecified type diabetes mellitus without mention of complication, not stated as uncontrolled.    SURGICAL HISTORY   has a past surgical history that includes mitral valve replace (3/14/08); maze procedure (3/14/08); angiogram (7/3/2009); angiogram (2009); cath removal (2009); thrombectomy (2009); embolectomy (2009); irrigation & debridement general (2009); wide excision (2009); other cardiac surgery (); other abdominal surgery; mitral valve replace (3/8/2017); lopez (3/8/2017); aortic valve replacement; and craniotomy (Left, 3/25/2021).    SOCIAL HISTORY  Social History     Tobacco Use   • Smoking status: Former Smoker     Packs/day: 2.50     Years: 9.00     Pack years: 22.50     Types: Cigarettes     Quit date: 1981     Years since quittin.3   • Smokeless tobacco: Never Used   Substance and Sexual Activity   • Alcohol use: No   • Drug use: Yes     Types: Marijuana   • Sexual activity: Not on file       REVIEW OF SYSTEMS  See HPI for further details.  All other systems are negative except as above in HPI.    PHYSICAL EXAM  VITAL SIGNS: Pulse 80   Temp 37.1 °C (98.8 °F) (Oral)   Resp 16   Ht 1.78 m (5' 10.08\")   Wt 98.7 kg (217 lb 9.5 oz)   SpO2 97%   BMI 31.15 kg/m²     General:  WDWN elderly male, generally weak and chronically ill appearing in NAD; A+Ox3; V/S as above   Skin: warm and dry; pale color; no rash  HEENT: NC; healing surgical incision over the left parietal scalp; EOMs intact; PERRL; no scleral icterus   Neck: FROM; soft  Cardiovascular: Regular heart rate and rhythm.  No murmurs, rubs, or gallops; pulses 2+ bilaterally radially and DP areas  Lungs: Clear to auscultation " with good air movement bilaterally.  No wheezes, rhonchi, or rales.   Abdomen: BS present; soft; NTND; no rebound, guarding, or rigidity.  No organomegaly or pulsatile mass  Extremities: BIANCHI x 4; no e/o trauma; no pedal edema; neg Herbert's  Neurologic: CNs III-XII grossly intact; speech clear; distal sensation intact; strength 5/5 UE/LEs  Psychiatric: Appropriate affect, normal mood    LABS  Results for orders placed or performed during the hospital encounter of 04/14/21   CBC WITH DIFFERENTIAL   Result Value Ref Range    WBC 5.0 4.8 - 10.8 K/uL    RBC 3.41 (L) 4.70 - 6.10 M/uL    Hemoglobin 11.2 (L) 14.0 - 18.0 g/dL    Hematocrit 34.5 (L) 42.0 - 52.0 %    .2 (H) 81.4 - 97.8 fL    MCH 32.8 27.0 - 33.0 pg    MCHC 32.5 (L) 33.7 - 35.3 g/dL    RDW 65.2 (H) 35.9 - 50.0 fL    Platelet Count 68 (L) 164 - 446 K/uL    MPV 10.8 9.0 - 12.9 fL    Neutrophils-Polys 76.20 (H) 44.00 - 72.00 %    Lymphocytes 11.70 (L) 22.00 - 41.00 %    Monocytes 9.30 0.00 - 13.40 %    Eosinophils 0.80 0.00 - 6.90 %    Basophils 0.40 0.00 - 1.80 %    Immature Granulocytes 1.60 (H) 0.00 - 0.90 %    Nucleated RBC 0.00 /100 WBC    Neutrophils (Absolute) 3.78 1.82 - 7.42 K/uL    Lymphs (Absolute) 0.58 (L) 1.00 - 4.80 K/uL    Monos (Absolute) 0.46 0.00 - 0.85 K/uL    Eos (Absolute) 0.04 0.00 - 0.51 K/uL    Baso (Absolute) 0.02 0.00 - 0.12 K/uL    Immature Granulocytes (abs) 0.08 0.00 - 0.11 K/uL    NRBC (Absolute) 0.00 K/uL    Anisocytosis 2+ (A)     Macrocytosis 1+    CMP   Result Value Ref Range    Sodium 136 135 - 145 mmol/L    Potassium 4.1 3.6 - 5.5 mmol/L    Chloride 102 96 - 112 mmol/L    Co2 26 20 - 33 mmol/L    Anion Gap 8.0 7.0 - 16.0    Glucose 122 (H) 65 - 99 mg/dL    Bun 16 8 - 22 mg/dL    Creatinine 0.78 0.50 - 1.40 mg/dL    Calcium 8.6 8.5 - 10.5 mg/dL    AST(SGOT) 62 (H) 12 - 45 U/L    ALT(SGPT) 140 (H) 2 - 50 U/L    Alkaline Phosphatase 100 (H) 30 - 99 U/L    Total Bilirubin 0.7 0.1 - 1.5 mg/dL    Albumin 3.2 3.2 - 4.9 g/dL     Total Protein 5.9 (L) 6.0 - 8.2 g/dL    Globulin 2.7 1.9 - 3.5 g/dL    A-G Ratio 1.2 g/dL   Prothrombin Time   Result Value Ref Range    PT 14.1 12.0 - 14.6 sec    INR 1.06 0.87 - 1.13   ESTIMATED GFR   Result Value Ref Range    GFR If African American >60 >60 mL/min/1.73 m 2    GFR If Non African American >60 >60 mL/min/1.73 m 2   PERIPHERAL SMEAR REVIEW   Result Value Ref Range    Peripheral Smear Review see below    MORPHOLOGY   Result Value Ref Range    RBC Morphology Present     Poikilocytosis 1+     Ovalocytes 1+    DIFFERENTIAL COMMENT   Result Value Ref Range    Comments-Diff see below    SARS-CoV-2 PCR (24 hour In-House): Collect NP swab in VTM    Specimen: Respirate   Result Value Ref Range    SARS-CoV-2 Source NP Swab        IMAGING  CT-HEAD W/O   Final Result         1.  Status post evacuation of left subdural hematoma. Residual subdural collection is similar in thickness to the prior exam with associated sulci effacement, but no midline shift.      2.  Small amount of subarachnoid hemorrhage is unchanged.          MEDICAL RECORD  I have reviewed patient's medical record and pertinent results are listed below.      COURSE & MEDICAL DECISION MAKING  I have reviewed any medical record information, laboratory studies and radiographic results as noted.    Morales Olivier is a 65 y.o. male who presents complaining of generalized weakness.  He was unable to care for self after discharge from [facility] yesterday.  He states he was unable to ambulate.    Appropriate PPE was worn at all times while interacting with the patient, including goggles, N95 mask, and surgical mask.    Labs demonstrate stable anemia, persistent thrombocytopenia, glucose 122, and elevated transaminases and alk phos which was noted during his recent admission as well.    CT head demonstrates no increased subdural or effacement.    4:17 PM  I discussed the case with the hospitalist, Dr. Solano, who agrees to admit the patient, who he knows  quite well from his prior admission.      FINAL IMPRESSION  1. Weakness     2. FTT (failure to thrive) in adult         Electronically signed by: Savi Patel M.D., 4/14/2021 12:27 PM

## 2021-04-14 NOTE — ED TRIAGE NOTES
"Chief Complaint   Patient presents with   • Failure to Thrive     Brought in by EMS from home.  NIVIA from [facility] yesterday after a brain bleed.  Found out he is unable to care for himself at home.  Hurts all over.  Needs help-unable to ambulate   States he was supposed to have a home health nurse but they have not visited yet.  Told me the only medication he went home with was \"oxy\" but the med list has a number of medications on it. He hurst all over and needed pain medication every 4 hours at home.    "

## 2021-04-14 NOTE — ED NOTES
Pharmacy Medication Reconciliation      Medication reconciliation updated and complete per pt at bedside and historically  Allergies have been verified  [facility]    Pt was discharged from [facility] on 04/13/2021

## 2021-04-14 NOTE — ED NOTES
Pt was given microwaved meal and enjoyed.  Had not eaten since he went home from the hospital yesterday.  Cont to c/o back pain.

## 2021-04-15 NOTE — THERAPY
Missed Therapy     Patient Name: Morales Olivier  Age:  65 y.o., Sex:  male  Medical Record #: [id number]  Today's Date: 4/15/2021    Discussed missed therapy with nursing       04/15/21 0949   Interdisciplinary Plan of Care Collaboration   IDT Collaboration with  Nursing   Collaboration Comments Clinical swallow evaluation order recieved and acknowledged. Per RN, patient is tolerating reg diet and CSE is not indicated. RN to cancel order.

## 2021-04-15 NOTE — ASSESSMENT & PLAN NOTE
We will place the patient on insulin sliding scale, hypoglycemia protocol.  We will monitor and make changes accordingly.

## 2021-04-15 NOTE — ASSESSMENT & PLAN NOTE
We will continue home dose of metoprolol.  As needed labetalol  Monitor and make changes accordingly.

## 2021-04-15 NOTE — ASSESSMENT & PLAN NOTE
"Patient was recently admitted and was found to have subdural hematoma.  Neurosurgery had been following.  Repeat CT scan 3/22: 1.2cm extra axial hematoma (from 1cm) with 4.6mm midline shift to right slightly greater compared to prior imaging  S/p craniotomy 3/25  Patient had been discharged and would follow with neurosurgery as an outpatient.  CT head on 4/14/21 reported: \"Status post evacuation of left subdural hematoma. Residual subdural collection is similar in thickness to the prior exam with associated sulci effacement, but no midline shift.\"   On previous hospitalization patient had been hyponatremic and neurosurgery has ordered fludrocortisone and salt tablets however sodium on admission was 136.  Upon discharge last hospitalization patient was found to have cortisol deficiency and was placed on hydrocortisone therapy.  It is unlikely patient will require fludrocortisone while patient is on hydrocortisone.  We will monitor and make changes accordingly.  PT/OT    Repeat CT head 4/19 with stable small SAH  "

## 2021-04-15 NOTE — PROGRESS NOTES
2 Rn Skin Check:    Healing surgical incision to R scalp. Edges attached.   Generalized bruising.   Bruising to bilateral upper extremities, trunk. Large  horizonal bruise to abdomen- purple, approx. 8 inches across.   Moisture associated redness, rash and excoriation to R groin area.  Sacrum red, non blanchable. Mepilex placed.  Discoloration and redness to bilateral lower extremities.     Elbows and heels intact and blanchable.

## 2021-04-15 NOTE — ASSESSMENT & PLAN NOTE
Patient currently not on exacerbation or requiring oxygen.  We will monitor closely and make changes accordingly.

## 2021-04-15 NOTE — H&P
Hospital Medicine History & Physical Note    Date of Service  4/14/2021    Primary Care Physician  Yelena Haney P.A.-C.    Consultants  None    Code Status  Full Code    Chief Complaint  Chief Complaint   Patient presents with   • Failure to Thrive     Brought in by EMS from home.  DC from [facility] yesterday after a brain bleed.  Found out he is unable to care for himself at home.  Hurts all over.  Needs help-unable to ambulate       History of Presenting Illness  65 y.o. male with past medical history of atrial fibrillation, not currently on anticoagulation, gout, COPD, history of prosthetic mitral valve, low serum cortisol level, hypertension, alcoholic liver cirrhosis, seizure, BPH, diabetes, status post pacemaker who presented 4/14/2021 with generalized weakness.    The patient states that he was recently discharged from the hospitalization after not accepting going to a skilled nursing facility as it was previously recommended.  The patient states that he was discharged home yesterday, however upon arriving home as he lives by himself he noticed he still had generalized weakness, and was unable to take care of himself, and was also unable to even take his medications.  He decided to come back to the ED for further assessment and evaluation and mentions now that the patient will agree to a skilled nursing facility if needed.  The patient currently laying down in bed, currently not complaining of any pain.  The patient denies chest pain, abdominal pain, headache or any other focal neurological deficit.    Of note, the patient had a lengthy hospitalization recently that was discharged yesterday.  The patient was found to have subarachnoid hemorrhage eventually needed craniotomy and evacuation by neurosurgery.  He developed seizures for he was placed on Keppra but later switched to Vimpat due to ongoing thrombocytopenia.  Hematology was consulted for thrombocytopenia and multiple studies were made during the last  hospitalization.  It was concluded that the patient thrombocytopenia possibly due to Keppra and allopurinol on top of liver disease, thought to have cirrhosis due to previous heavy alcohol abuse.  As per chart review and discharge summary patient had positive SPENCER and anti-SSA/RO antibodies for which she will require outpatient follow-up with rheumatology.  It was recommended during last hospitalization the patient not to resume anticoagulation due to high risk of bleeding.  Patient was also found to have low cortisol level for which he was placed on hydrocortisone therapy, and he will require endocrine referral as well.    Due to the patient's failure to thrive at home, he was admitted for placement and safe discharge planning.  PT OT have been consulted, we appreciate further recommendations.    Review of Systems  Review of Systems   Constitutional: Negative for chills, diaphoresis, fever, malaise/fatigue and weight loss.   HENT: Negative for ear discharge, ear pain, hearing loss, nosebleeds and tinnitus.    Eyes: Negative for blurred vision, double vision and photophobia.   Respiratory: Negative for cough, hemoptysis, sputum production and shortness of breath.    Cardiovascular: Negative for chest pain, palpitations, orthopnea and claudication.   Gastrointestinal: Negative for abdominal pain, diarrhea, heartburn, nausea and vomiting.   Genitourinary: Negative for dysuria, frequency and urgency.   Neurological: Positive for weakness. Negative for dizziness, tingling and headaches.   Psychiatric/Behavioral: Negative for depression, hallucinations and suicidal ideas. The patient is not nervous/anxious.        Past Medical History   has a past medical history of Atrial fibrillation (HCC), Back pain, Bronchitis, CAD (coronary artery disease), COPD (chronic obstructive pulmonary disease) (HCC), Dehydration (10/22/2019), Gout, Heart valve disease, Hypertension, Kidney stone, Obesity, Open wound (9/2/2009), Pacemaker,  Personal history of venous thrombosis and embolism (2009), PVC (premature ventricular contraction) (4/13/2019), Renal disorder, and Type II or unspecified type diabetes mellitus without mention of complication, not stated as uncontrolled.    Surgical History   has a past surgical history that includes mitral valve replace (3/14/08); maze procedure (3/14/08); angiogram (7/3/2009); angiogram (7/4/2009); cath removal (7/4/2009); thrombectomy (7/4/2009); embolectomy (7/4/2009); irrigation & debridement general (7/20/2009); wide excision (7/20/2009); other cardiac surgery (2008); other abdominal surgery; mitral valve replace (3/8/2017); lopez (3/8/2017); aortic valve replacement; and craniotomy (Left, 3/25/2021).     Family History  family history includes Diabetes in his mother; Lung Disease in his father.     Social History   reports that he quit smoking about 40 years ago. His smoking use included cigarettes. He has a 22.50 pack-year smoking history. He has never used smokeless tobacco. He reports current drug use. Drug: Marijuana. He reports that he does not drink alcohol.    Allergies  No Known Allergies    Medications  Prior to Admission Medications   Prescriptions Last Dose Informant Patient Reported? Taking?   cetirizine (ZYRTEC) 10 MG Tab unk at unk Historical Yes No   Sig: Take 10 mg by mouth every morning.   fludrocortisone (FLORINEF) 0.1 MG Tab not started at not started Historical No No   Sig: Take 1 tablet by mouth every morning.   hydrOXYzine HCl (ATARAX) 25 MG Tab unk at unk Historical No No   Sig: Take 1 Tab by mouth 3 times a day as needed for Itching.   hydrocortisone (CORTEF) 10 MG Tab not started at not started Historical No No   Sig: Take 1 tablet by mouth every evening.   hydrocortisone (CORTEF) 20 MG Tab not started at not started Historical No No   Sig: Take 1 tablet by mouth every day.   lacosamide (VIMPAT) 100 MG Tab tablet not started at not started Historical No No   Sig: Take 1 tablet by mouth  2 times a day for 30 days.   metFORMIN (GLUCOPHAGE) 500 MG Tab unk at unk Historical Yes No   Sig: Take 500 mg by mouth 2 times a day, with meals.   metoprolol SR (TOPROL XL) 25 MG TABLET SR 24 HR not started at not started Historical No No   Sig: Take 1 tablet by mouth every morning.   multivitamin (THERAGRAN) Tab unk at k Historical No No   Sig: Take 1 tablet by mouth every day.   oxyCODONE immediate release (ROXICODONE) 10 MG immediate release tablet 4/13/2021 at pm Historical No No   Sig: Take 1 tablet by mouth every 4 hours as needed for Mod or Severe Pain for up to 3 days.   senna-docusate (PERICOLACE OR SENOKOT S) 8.6-50 MG Tab not started at not started Historical No No   Sig: Take 2 Tablets by mouth 2 times a day.   sodium chloride (SALT) 1 GM Tab not started at not started Historical No No   Sig: Take 2 Tablets by mouth 3 times a day with meals.   tamsulosin (FLOMAX) 0.4 MG capsule unk at k Historical No No   Sig: TAKE 1 CAPSULE BY MOUTH EVERY DAY   vitamin D3, cholecalciferol, 400 UNIT Tab tablet not started at not started Historical No No   Sig: Take 2 Tablets by mouth every day.      Facility-Administered Medications: None       Physical Exam  Temp:  [36.1 °C (97 °F)-37.1 °C (98.8 °F)] 36.1 °C (97 °F)  Pulse:  [80-81] 80  Resp:  [16-20] 20  BP: (103-125)/(55-85) 125/85  SpO2:  [95 %-98 %] 97 %    Physical Exam  Constitutional:       General: He is not in acute distress.     Appearance: He is obese. He is not ill-appearing, toxic-appearing or diaphoretic.   HENT:      Head:      Comments: scal incision noted, no signs of infection.     Mouth/Throat:      Pharynx: No oropharyngeal exudate or posterior oropharyngeal erythema.   Eyes:      General:         Right eye: No discharge.         Left eye: No discharge.   Cardiovascular:      Rate and Rhythm: Normal rate.      Heart sounds: No murmur. No gallop.    Pulmonary:      Effort: No respiratory distress.      Breath sounds: No wheezing or rales.    Abdominal:      General: There is no distension.      Palpations: Abdomen is soft.      Tenderness: There is no abdominal tenderness. There is no guarding.   Musculoskeletal:      Right lower leg: No edema.      Left lower leg: No edema.   Skin:     General: Skin is warm and dry.   Neurological:      General: No focal deficit present.      Mental Status: He is alert and oriented to person, place, and time.      Cranial Nerves: No cranial nerve deficit.      Motor: No weakness.   Psychiatric:         Mood and Affect: Mood normal.         Behavior: Behavior normal.         Thought Content: Thought content normal.         Judgment: Judgment normal.         Laboratory:  Recent Labs     04/12/21 0755 04/14/21  1350   WBC 3.8* 5.0   RBC 3.65* 3.41*   HEMOGLOBIN 11.7* 11.2*   HEMATOCRIT 37.3* 34.5*   .2* 101.2*   MCH 32.1 32.8   MCHC 31.4* 32.5*   RDW 64.3* 65.2*   PLATELETCT 76* 68*   MPV 11.3 10.8     Recent Labs     04/12/21  0755 04/13/21  0616 04/14/21  1350   SODIUM 132* 136 136   POTASSIUM 3.9 4.2 4.1   CHLORIDE 98 104 102   CO2 28 28 26   GLUCOSE 169* 104* 122*   BUN 23* 24* 16   CREATININE 0.81 0.76 0.78   CALCIUM 8.6 8.6 8.6     Recent Labs     04/12/21  0755 04/13/21  0616 04/14/21  1350   ALTSGPT  --   --  140*   ASTSGOT  --   --  62*   ALKPHOSPHAT  --   --  100*   TBILIRUBIN  --   --  0.7   GLUCOSE 169* 104* 122*     Recent Labs     04/14/21  1350   INR 1.06     No results for input(s): NTPROBNP in the last 72 hours.      No results for input(s): TROPONINT in the last 72 hours.    Imaging:  CT-HEAD W/O   Final Result         1.  Status post evacuation of left subdural hematoma. Residual subdural collection is similar in thickness to the prior exam with associated sulci effacement, but no midline shift.      2.  Small amount of subarachnoid hemorrhage is unchanged.            Assessment/Plan:  I anticipate this patient will require at least two midnights for appropriate medical management,  "necessitating inpatient admission.    * Failure to thrive in adult  Assessment & Plan  Patient was initially recommended to go to a skilled nursing facility versus rehab during the past hospitalization.  It appears the patient denied such services and was discharged home.  He was unable to take care of himself at home reason for which he came back to the ED.  The patient mentions that he was not even able to take his medications.  The patient now mentions he would go to a skilled nursing facility if warranted.  We have consulted PT OT we appreciate further recommendations.    Thrombocytopenia (HCC)- (present on admission)  Assessment & Plan  Patient developed thrombocytopenia during the last hospitalization.  Hematology had been consulted  It was determined to stop the patient's allopurinol as well as Keppra which could cause thrombocytopenia.  Hematology recommended reconsulting them if platelets drop below 50.  We will monitor closely, repeat CBC.    Of note, as per previous discharge summary, during heme workup patient did have positive SPENCER and anti-SSA/RO antibodies, which will require follow up with rheumatology as outpatient, as well as GI follow up for liver disease.    Subdural hematoma (HCC)  Assessment & Plan  Patient was recently admitted and was found to have subdural hematoma.  Neurosurgery had been following.  Repeat CT scan 3/22: 1.2cm extra axial hematoma (from 1cm) with 4.6mm midline shift to right slightly greater compared to prior imaging  S/p craniotomy 3/25    Patient had been discharged and would follow with neurosurgery as an outpatient.    CT head on 4/14/21 reported: \"Status post evacuation of left subdural hematoma. Residual subdural collection is similar in thickness to the prior exam with associated sulci effacement, but no midline shift.\"     On previous hospitalization patient had been hyponatremic and neurosurgery has ordered fludrocortisone and salt tablets however sodium on admission " was 136.  Upon discharge last hospitalization patient was found to have cortisol deficiency and was placed on hydrocortisone therapy.  It is unlikely patient will require fludrocortisone while patient is on hydrocortisone.  We will monitor and make changes accordingly.    PT/OT    Low serum cortisol level ([facility])- (present on admission)  Assessment & Plan  Patient was found to have low serum cortisol level during last hospitalization and was placed on hydrocortisone therapy.  Continue home dose.    History of prosthetic mitral valve- (present on admission)  Assessment & Plan  As per last hospitalization, anticoagulation had been recommended not to continue due to bleeding risk.  As per discharge summary, neurology recommended aspirin in 2 weeks.    COPD (chronic obstructive pulmonary disease) ([facility])- (present on admission)  Assessment & Plan  Patient currently not on exacerbation or requiring oxygen.  We will monitor closely and make changes accordingly.    Gout- (present on admission)  Assessment & Plan  Patient with a history of gout.  The patient currently not complaining of any pain.  Patient's home dose of allopurinol had been stopped during the past hospitalization due to ongoing thrombocytopenia.    Atrial fibrillation ([facility])- (present on admission)  Assessment & Plan  Continue patient's home dose of metoprolol.  Continue telemetry  As per cardiology during the past hospitalization did not recommend restarting anticoagulation at this time.    Anemia  Assessment & Plan  There does not appear to be any active bleeding at this time.  Hemoglobin has remained stable.  We will monitor, repeat CBC and make changes accordingly.    Seizure ([facility])- (present on admission)  Assessment & Plan  Patient developed seizures due to subdural hematoma and was initially placed on Keppra.  Due to ongoing thrombocytopenia Keppra was discontinued and switched to Vimpat which we will continue at this time.  Seizure  precautions.    Alcoholic cirrhosis of liver without ascites (HCC)- (present on admission)  Assessment & Plan  It appears patient was diagnosed with liver cirrhosis during the past hospitalization.  The patient currently not complaining of any abdominal pain  The patient will require outpatient GI follow-up.    Hypertension- (present on admission)  Assessment & Plan  We will continue home dose of metoprolol.  As needed labetalol  Monitor and make changes accordingly.    Pacemaker- (present on admission)  Assessment & Plan  Patient with a history of pacemaker.  This limits the ability to perform MRI.    Obesity (BMI 30-39.9)- (present on admission)  Assessment & Plan  Patient counseled on lifestyle modifications.  He will require close follow-up as an outpatient.    Type 2 diabetes mellitus with complication, without long-term current use of insulin (HCC)- (present on admission)  Assessment & Plan  We will place the patient on insulin sliding scale, hypoglycemia protocol.  We will monitor and make changes accordingly.    Benign prostatic hyperplasia without lower urinary tract symptoms- (present on admission)  Assessment & Plan  Resume home dose of tamsulosin.        DVT: SCDs for now.

## 2021-04-15 NOTE — ASSESSMENT & PLAN NOTE
Patient with a history of gout.  The patient currently not complaining of any pain.  Patient's home dose of allopurinol had been stopped during the past hospitalization due to ongoing thrombocytopenia.

## 2021-04-15 NOTE — ASSESSMENT & PLAN NOTE
Continue patient's home dose of metoprolol.  Continue telemetry  As per cardiology during the past hospitalization did not recommend restarting anticoagulation at this time.

## 2021-04-15 NOTE — ASSESSMENT & PLAN NOTE
As per last hospitalization, anticoagulation had been recommended not to continue due to bleeding risk.  As per discharge summary, neurology recommended aspirin in 2 weeks (4/27/2021)

## 2021-04-15 NOTE — PROGRESS NOTES
[facility] Medicine Daily Progress Note    Date of Service  4/15/2021    Chief Complaint  65 y.o. male admitted 4/14/2021 with failure to thrive    Hospital Course  A 65-year-old man with h/o DM, HTN, Afib (not on AC), s/p PPM, gout, COPD, prosthetic mitral valve, low serum cortisol, alcoholic liver cirrhosis, BPH, seizure disorder, subarachnoid hemorrhage, s/p craniotomy, thrombocytopenia presented with generalized weakness. Patient was recently discharged from hospital after not accepting going to a skilled nursing facility. Patient was not able to take care of himself, he now agrees to go to SNF.    Interval Problem Update  Patient reports headache, lower back pain, generalized weakness, unable to walk.  Afebrile, hemodynamically stable.  Na 132.  Referral to SNF placed.    Consultants/Specialty  None    Code Status  Full Code    Disposition  TBD  PT, OT evaluation    Review of Systems  Review of Systems   Constitutional: Positive for malaise/fatigue. Negative for chills and fever.   HENT: Negative for hearing loss.    Eyes: Negative.    Respiratory: Negative for cough and shortness of breath.    Cardiovascular: Negative for chest pain.   Gastrointestinal: Negative for abdominal pain and nausea.   Genitourinary: Negative for dysuria.   Musculoskeletal: Positive for back pain and myalgias.   Skin: Positive for rash.        Bruises on arms   Neurological: Positive for weakness and headaches.        Physical Exam  Temp:  [36.2 °C (97.2 °F)-37 °C (98.6 °F)] 36.6 °C (97.9 °F)  Pulse:  [80-83] 80  Resp:  [16-20] 16  BP: (101-126)/(55-85) 102/72  SpO2:  [95 %-98 %] 96 %    Physical Exam  Vitals and nursing note reviewed.   Constitutional:       Appearance: He is ill-appearing.   HENT:      Head: Normocephalic.      Mouth/Throat:      Mouth: Mucous membranes are moist.      Pharynx: Oropharynx is clear.   Eyes:      Pupils: Pupils are equal, round, and reactive to light.   Cardiovascular:      Rate and Rhythm: Normal rate  and regular rhythm.      Heart sounds: Normal heart sounds.   Pulmonary:      Effort: Pulmonary effort is normal. No respiratory distress.      Breath sounds: Normal breath sounds.   Abdominal:      General: Abdomen is flat. Bowel sounds are normal.      Tenderness: There is no abdominal tenderness.   Musculoskeletal:         General: Normal range of motion.      Cervical back: Normal range of motion.   Skin:     General: Skin is warm.      Comments: B/l venous stasis changes on shins   Neurological:      General: No focal deficit present.      Mental Status: He is alert and oriented to person, place, and time.      Motor: Weakness present.         Fluids    Intake/Output Summary (Last 24 hours) at 4/15/2021 1236  Last data filed at 4/15/2021 1000  Gross per 24 hour   Intake --   Output 425 ml   Net -425 ml       Laboratory  Recent Labs     04/14/21  1350 04/15/21  0045   WBC 5.0 3.9*   RBC 3.41* 3.06*   HEMOGLOBIN 11.2* 10.0*   HEMATOCRIT 34.5* 30.3*   .2* 99.0*   MCH 32.8 32.7   MCHC 32.5* 33.0*   RDW 65.2* 63.5*   PLATELETCT 68* 60*   MPV 10.8 10.6     Recent Labs     04/13/21  0616 04/14/21  1350 04/15/21  0045   SODIUM 136 136 132*   POTASSIUM 4.2 4.1 3.7   CHLORIDE 104 102 102   CO2 28 26 25   GLUCOSE 104* 122* 113*   BUN 24* 16 21   CREATININE 0.76 0.78 0.87   CALCIUM 8.6 8.6 8.0*     Recent Labs     04/14/21  1350   INR 1.06               Imaging  CT-HEAD W/O   Final Result         1.  Status post evacuation of left subdural hematoma. Residual subdural collection is similar in thickness to the prior exam with associated sulci effacement, but no midline shift.      2.  Small amount of subarachnoid hemorrhage is unchanged.           Assessment/Plan  * Failure to thrive in adult  Assessment & Plan  Patient was initially recommended to go to a skilled nursing facility versus rehab during the past hospitalization.  It appears the patient denied such services and was discharged home.  He was unable to take  "care of himself at home reason for which he came back to the ED.  The patient mentions that he was not even able to take his medications.  The patient now mentions he would go to a skilled nursing facility if warranted.  We have consulted PT OT we appreciate further recommendations.    Thrombocytopenia (HCC)- (present on admission)  Assessment & Plan  Patient developed thrombocytopenia during the last hospitalization.  Hematology had been consulted  It was determined to stop the patient's allopurinol as well as Keppra which could cause thrombocytopenia.  Hematology recommended reconsulting them if platelets drop below 50.  We will monitor closely, repeat CBC.    Of note, as per previous discharge summary, during heme workup patient did have positive SPENCER and anti-SSA/RO antibodies, which will require follow up with rheumatology as outpatient, as well as GI follow up for liver disease.    Subdural hematoma (HCC)  Assessment & Plan  Patient was recently admitted and was found to have subdural hematoma.  Neurosurgery had been following.  Repeat CT scan 3/22: 1.2cm extra axial hematoma (from 1cm) with 4.6mm midline shift to right slightly greater compared to prior imaging  S/p craniotomy 3/25    Patient had been discharged and would follow with neurosurgery as an outpatient.    CT head on 4/14/21 reported: \"Status post evacuation of left subdural hematoma. Residual subdural collection is similar in thickness to the prior exam with associated sulci effacement, but no midline shift.\"     On previous hospitalization patient had been hyponatremic and neurosurgery has ordered fludrocortisone and salt tablets however sodium on admission was 136.  Upon discharge last hospitalization patient was found to have cortisol deficiency and was placed on hydrocortisone therapy.  It is unlikely patient will require fludrocortisone while patient is on hydrocortisone.  We will monitor and make changes accordingly.    PT/OT    Low serum " cortisol level (HCC)- (present on admission)  Assessment & Plan  Patient was found to have low serum cortisol level during last hospitalization and was placed on hydrocortisone therapy.  Continue home dose.    History of prosthetic mitral valve- (present on admission)  Assessment & Plan  As per last hospitalization, anticoagulation had been recommended not to continue due to bleeding risk.  As per discharge summary, neurology recommended aspirin in 2 weeks.    COPD (chronic obstructive pulmonary disease) (HCC)- (present on admission)  Assessment & Plan  Patient currently not on exacerbation or requiring oxygen.  We will monitor closely and make changes accordingly.    Gout- (present on admission)  Assessment & Plan  Patient with a history of gout.  The patient currently not complaining of any pain.  Patient's home dose of allopurinol had been stopped during the past hospitalization due to ongoing thrombocytopenia.    Atrial fibrillation (HCC)- (present on admission)  Assessment & Plan  Continue patient's home dose of metoprolol.  Continue telemetry  As per cardiology during the past hospitalization did not recommend restarting anticoagulation at this time.    Anemia  Assessment & Plan  There does not appear to be any active bleeding at this time.  Hemoglobin has remained stable.  We will monitor, repeat CBC and make changes accordingly.    Seizure (HCC)- (present on admission)  Assessment & Plan  Patient developed seizures due to subdural hematoma and was initially placed on Keppra.  Due to ongoing thrombocytopenia Keppra was discontinued and switched to Vimpat which we will continue at this time.  Seizure precautions.    Alcoholic cirrhosis of liver without ascites (HCC)- (present on admission)  Assessment & Plan  It appears patient was diagnosed with liver cirrhosis during the past hospitalization.  The patient currently not complaining of any abdominal pain  The patient will require outpatient GI  follow-up.    Hypertension- (present on admission)  Assessment & Plan  We will continue home dose of metoprolol.  As needed labetalol  Monitor and make changes accordingly.    Pacemaker- (present on admission)  Assessment & Plan  Patient with a history of pacemaker.  This limits the ability to perform MRI.    Obesity (BMI 30-39.9)- (present on admission)  Assessment & Plan  Patient counseled on lifestyle modifications.  He will require close follow-up as an outpatient.    Type 2 diabetes mellitus with complication, without long-term current use of insulin (HCC)- (present on admission)  Assessment & Plan  We will place the patient on insulin sliding scale, hypoglycemia protocol.  We will monitor and make changes accordingly.    Benign prostatic hyperplasia without lower urinary tract symptoms- (present on admission)  Assessment & Plan  Resume home dose of tamsulosin.       VTE prophylaxis: SCD

## 2021-04-15 NOTE — THERAPY
"Occupational Therapy   Initial Evaluation     Patient Name: Morales Olivier  Age:  65 y.o., Sex:  male  Medical Record #: [id number]  Today's Date: 4/15/2021     Precautions  Precautions: Fall Risk    Assessment  Patient is 65 y.o. male who presents to acute secondary to failure to thrive at home. Pt was home less that 24 hours from discharge from [facility] (pt refused placement as was recommended) where he had been hospitalized for a recent SDH and crani. Pt has PMHx of atrial fibrillation, gout, COPD, history of prosthetic mitral valve, hypertension, alcoholic liver cirrhosis, seizure, BPH, diabetes, status post pacemaker. Pt presents to OT [facility] with impaired activity tolerance, gross weakness, and decreased independence with IADLs. Pt reports he \"got stuck on toilet\" when he was at home and had to call [facility] to help him. Pt states he does not have appropriately placed grab bars near his toilet at home. Pt able to complete LB dressing with spv, standing grooming/hygiene with spv, toilet transfer with spv, and toileting with spv, however reported fatigue during standing ADLs, requiring seated rest breaks. Anticipate pt will not be able to complete ADLs such as showering and IADLs such as meal prep, cleaning, and med management independently. Recommend post-acute placement for additional occupational therapy services prior to discharge home.     Plan    Recommend Occupational Therapy 3 times per week until therapy goals are met for the following treatments:  Adaptive Equipment, Cognitive Skill Development, Manual Therapy Techniques, Neuro Re-Education / Balance, Self Care/Activities of Daily Living, Therapeutic Activities and Therapeutic Exercises.    DC Equipment Recommendations: Unable to determine at this time  Discharge Recommendations: Recommend post-acute placement for additional occupational therapy services prior to discharge home.     Subjective    Pt alert and cooperative. Pt states, \"My back hurts!\"   "   Objective       04/15/21 1059   Prior Living Situation   Prior Services Home-Independent   Housing / Facility 1 Story Apartment / Condo   Steps Into Home 0   Steps In Home 0   Bathroom Set up Bathtub / Shower Combination;Grab Bars   Equipment Owned Front-Wheel Walker;Grab Bar(s) In Tub / Shower;Grab Bar(s) By Toilet  (grab bar by toilet is behind toilet)   Lives with - Patient's Self Care Capacity Alone and Unable to Care For Self   Comments Pt lives alone. He was recently discharged from [facility] however go home and says he was unable to get off toilet and had to call [facility] to bring him back to hospital. Pt is now agreeable to go to SNF. Prior to recent hospitalization was independent with all ADLs.   Prior Level of ADL Function   Self Feeding Independent   Grooming / Hygiene Independent   Bathing Independent   Dressing Independent   Toileting Independent   Prior Level of IADL Function   Medication Management Independent   Laundry Independent   Kitchen Mobility Independent   Finances Independent   Home Management Independent   Shopping Independent   Prior Level Of Mobility Independent With Device in Community;Independent With Device in Home   Driving / Transportation Utilizes Taxi Service for Transportation   Occupation (Pre-Hospital Vocational) Retired Due To Age   Leisure Interests Unable To Determine At This Time   Cognition    Cognition / Consciousness WDL   Comments alert, pleasant, cooperative, poor insight into deficits   Strength Upper Body   Upper Body Strength  X   Gross Strength Generalized Weakness, Equal Bilaterally.    Comments 3+/5 gross strength in all UE muscle groups   Sensation Upper Body   Comments reports no acute changes   Coordination Upper Body   Comments appears WFL during formal testing   Balance Assessment   Comments seated with SBA, standing with close spv using FWW, no overt LOB   Bed Mobility    Supine to Sit Supervised   Comments HOB elevated, use of bed rail   ADL Assessment    Grooming Supervision;Standing  (washing hands)   Lower Body Dressing Supervision  (socks)   Toileting Supervision   Functional Mobility   Sit to Stand Supervised   Toilet Transfers Supervised   Transfer Method Stand Step   Mobility in room/bathroom with FWW   Edema / Skin Assessment   Edema / Skin  X   Comments bruising bilateral forearms   Activity Tolerance   Comments reported fatigue   Patient / Family Goals   Patient / Family Goal #1 To go to rehab   Short Term Goals   Short Term Goal # 1 Pt will complete simulated medication management with no verbal cues by discharge.   Short Term Goal # 2 Pt will complete seated shower with spv by discharge.   Short Term Goal # 3 Pt will complete simple meal prep with spv and no verbal cues by discharge.   Short Term Goal # 4 Pt will improve gross BUE strength to 4/5 for improved independence with ADLs/IADLs by discharge.   Short Term Goal # 5 Pt will complete 10 mins standing ADL activity with no rest breaks by discharge.

## 2021-04-15 NOTE — ASSESSMENT & PLAN NOTE
There does not appear to be any active bleeding at this time.  Hemoglobin has remained stable.  We will monitor, repeat CBC and make changes accordingly.

## 2021-04-15 NOTE — ASSESSMENT & PLAN NOTE
It appears patient was diagnosed with liver cirrhosis during the past hospitalization.  The patient currently not complaining of any abdominal pain  The patient will require outpatient GI follow-up.

## 2021-04-15 NOTE — CARE PLAN
Problem: Communication  Goal: The ability to communicate needs accurately and effectively will improve  Outcome: PROGRESSING AS EXPECTED     Problem: Safety  Goal: Will remain free from injury  Outcome: PROGRESSING AS EXPECTED  Goal: Will remain free from falls  Outcome: PROGRESSING AS EXPECTED  Note: Patient A+Ox4, verbalizes understanding of calling for assistance before getting out of bed. Bed locked and in low position, call light within reach. Non slid socks on patient. Bed alarm on. Patient demonstrates appropriate use of call light.  Hourly rounding in place.         Problem: Infection  Goal: Will remain free from infection  Outcome: PROGRESSING AS EXPECTED

## 2021-04-15 NOTE — RESPIRATORY CARE
COPD EDUCATION by COPD CLINICAL EDUCATOR  4/15/2021 at 8:05 AM by Ivonne Leigh, RRT     Patient reviewed by COPD education team. Patient does not have a formal diagnosis of COPD and is a non-smoker.  Therefore does not qualify for the COPD program.    COPD Screen       COPD Assessment  COPD Clinical Specialists ONLY  COPD Education Initiated: No--Quick Screen:  2018 PFT showed asthma, has ESTELA, hasn't smoked for over 40 years.  Is this a COPD exacerbation patient?: No

## 2021-04-15 NOTE — THERAPY
"Physical Therapy   Initial Evaluation     Patient Name: Morales Olivier  Age:  65 y.o., Sex:  male  Medical Record #: [id number]  Today's Date: 4/15/2021     Precautions: Fall Risk    Assessment  Mr. Olivier is a 64 y/o male who presents to acute secondary to failure to thrive at home.  Had been home less that 24 hours from discharge from [facility] (pt refused placement as was recommended) where he had been hospitalized for a recent SDH and crani. Pt admitted to therapist that he got stuck on his to toilet at home. \"I just couldn't figure it out\". Despite this appears to be very near the same level of function he was at upon recent discharge. LE strength equal bilaterally and WFL. No new sensory deficits. Able to perform bed mobility and transfers without physical assist. Gait was SPV level in straight path but very unsafe when making turns, min assist required due to LOB at this point. Recommend post acute placement or new living situation with 24/7 supervision. Acute PT to follow.    Plan    Recommend Physical Therapy 3 times per week until therapy goals are met for the following treatments:  Bed Mobility, Gait Training, Therapeutic Activities and Therapeutic Exercises    DC Equipment Recommendations: None  Discharge Recommendations: Recommend post-acute placement for additional physical therapy services prior to discharge home            Objective       04/15/21 1051   Prior Living Situation   Prior Services Unable To Determine At This Time   Housing / Facility 1 Story Apartment / Condo   Steps Into Home 0   Equipment Owned Front-Wheel Walker   Lives with - Patient's Self Care Capacity Alone and Unable to Care For Self   Prior Level of Functional Mobility   Bed Mobility Independent   Transfer Status Independent   Ambulation Independent   Distance Ambulation (Feet) 150   Assistive Devices Used Front-Wheel Walker   Cognition    Cognition / Consciousness WDL   Comments alert but poor insight into deficits   Passive ROM " Lower Body   Passive ROM Lower Body WDL   Active ROM Lower Body    Active ROM Lower Body  WDL   Strength Lower Body   Lower Body Strength  WDL   Sensation Lower Body   Lower Extremity Sensation   WDL   Balance Assessment   Sitting Balance (Static) Fair +   Sitting Balance (Dynamic) Fair   Standing Balance (Static) Fair   Standing Balance (Dynamic) Fair   Weight Shift Sitting Fair   Weight Shift Standing Fair   Comments FWW when standing   Gait Analysis   Gait Level Of Assist Minimal Assist   Assistive Device Front Wheel Walker   Distance (Feet) 75   # of Times Distance was Traveled 1   Deviation Bradykinetic;Shuffled Gait   Weight Bearing Status no restrictions   Bed Mobility    Supine to Sit Supervised   Sit to Supine   (up with OT)   Functional Mobility   Sit to Stand Supervised   Short Term Goals    Short Term Goal # 1 Pt will ambulate 150ft wtih FWW and SPV in 6 visits to return to prior level of function

## 2021-04-15 NOTE — ASSESSMENT & PLAN NOTE
Patient was initially recommended to go to a skilled nursing facility versus rehab during the past hospitalization.  It appears the patient denied such services and was discharged home.  He was unable to take care of himself at home reason for which he came back to the ED.  The patient mentions that he was not even able to take his medications.  The patient now mentions he would go to a skilled nursing facility if warranted.  We have consulted PT OT we appreciate further recommendations.

## 2021-04-15 NOTE — ASSESSMENT & PLAN NOTE
Patient developed seizures due to subdural hematoma and was initially placed on Keppra.  Due to ongoing thrombocytopenia Keppra was discontinued and switched to Vimpat which we will continue at this time.  Seizure precautions.

## 2021-04-15 NOTE — CARE PLAN
Problem: Communication  Goal: The ability to communicate needs accurately and effectively will improve  Outcome: PROGRESSING AS EXPECTED  Note: Encouraged use of call light, assessed needs, encouraged pt to voice feelings.        Problem: Safety  Goal: Will remain free from falls  Outcome: PROGRESSING AS EXPECTED  Note: Patient at risk for injury, bed alarm on, walker out of sight, nonskid socks on, call light within reach, personal belongings within reach, toileting offered.

## 2021-04-15 NOTE — DISCHARGE PLANNING
Care Transition Team Discharge Planning    Anticipates discharge disposition:  • SNF     Action:  • This RN CM is following the case.Met with Pt at bedside to obtain his SNF choice. Pt states he wants to discharge to [facility]  and not to a SNF. Will inform Dr Mata    Barriers to Discharge:  • Pending medical clearance  • Pending discharge disposition    Plan:  • Will continue to assist Pt with discharge as needed.

## 2021-04-15 NOTE — ASSESSMENT & PLAN NOTE
Patient developed thrombocytopenia during the last hospitalization.  Hematology had been consulted  It was determined to stop the patient's allopurinol as well as Keppra which could cause thrombocytopenia.  Hematology recommended reconsulting them if platelets drop below 50.  We will monitor closely  Of note, as per previous discharge summary, during heme workup patient did have positive SPENCER and anti-SSA/RO antibodies, which will require follow up with rheumatology as outpatient, as well as GI follow up for liver disease.

## 2021-04-16 NOTE — PROGRESS NOTES
Hospital Medicine Daily Progress Note    Date of Service  4/16/2021    Chief Complaint  65 y.o. male admitted 4/14/2021 with failure to thrive    Hospital Course  A 65-year-old man with h/o DM, HTN, Afib (not on AC), s/p PPM, gout, COPD, prosthetic mitral valve, low serum cortisol, alcoholic liver cirrhosis, BPH, seizure disorder, subarachnoid hemorrhage, s/p craniotomy, thrombocytopenia presented with generalized weakness. Patient was recently discharged from hospital after not accepting going to a skilled nursing facility. Patient was not able to take care of himself, he now agrees to go to SNF.    Interval Problem Update  Had fever 38.1 last night, hemodynamically stable.  wbc 5.0, Hb 10.0, plt 55. Na 128 - fluid restriction 1.2L  PT, OT recommended post-acute placement.  CXR, UA, blood cultures ordered.  CXR: hazy right pulmonary infiltrates, stable since prior, small right pleural effusion, cardiomegaly. Incentive spirometry ordered.    Consultants/Specialty  None    Code Status  Full Code    Disposition  TBD  PT, OT recommended post-acute placement.    Review of Systems  Review of Systems   Constitutional: Positive for malaise/fatigue. Negative for chills and fever.   HENT: Negative for hearing loss.    Eyes: Negative.    Respiratory: Negative for cough and shortness of breath.    Cardiovascular: Negative for chest pain.   Gastrointestinal: Negative for abdominal pain and nausea.   Genitourinary: Negative for dysuria.   Musculoskeletal: Positive for back pain and myalgias.   Skin: Positive for rash.        Bruises on arms   Neurological: Positive for weakness and headaches.        Physical Exam  Temp:  [36.9 °C (98.5 °F)-38.1 °C (100.6 °F)] 37.2 °C (98.9 °F)  Pulse:  [80-96] 89  Resp:  [17-20] 20  BP: (109-117)/(59-78) 114/67  SpO2:  [94 %-100 %] 95 %    Physical Exam  Vitals and nursing note reviewed.   Constitutional:       Appearance: He is ill-appearing.   HENT:      Head: Normocephalic.      Mouth/Throat:       Mouth: Mucous membranes are moist.      Pharynx: Oropharynx is clear.   Eyes:      Pupils: Pupils are equal, round, and reactive to light.   Cardiovascular:      Rate and Rhythm: Normal rate and regular rhythm.      Heart sounds: Normal heart sounds.   Pulmonary:      Effort: Pulmonary effort is normal. No respiratory distress.      Breath sounds: Normal breath sounds.   Abdominal:      General: Abdomen is flat. Bowel sounds are normal.      Tenderness: There is no abdominal tenderness.   Musculoskeletal:         General: Normal range of motion.      Cervical back: Normal range of motion.   Skin:     General: Skin is warm.      Comments: B/l venous stasis changes on shins   Neurological:      General: No focal deficit present.      Mental Status: He is alert and oriented to person, place, and time.      Motor: Weakness present.     Fluids    Intake/Output Summary (Last 24 hours) at 4/16/2021 1440  Last data filed at 4/16/2021 0927  Gross per 24 hour   Intake 120 ml   Output 400 ml   Net -280 ml       Laboratory  Recent Labs     04/14/21  1350 04/15/21  0045 04/16/21  0449   WBC 5.0 3.9* 5.0   RBC 3.41* 3.06* 3.05*   HEMOGLOBIN 11.2* 10.0* 10.0*   HEMATOCRIT 34.5* 30.3* 30.5*   .2* 99.0* 100.0*   MCH 32.8 32.7 32.8   MCHC 32.5* 33.0* 32.8*   RDW 65.2* 63.5* 63.0*   PLATELETCT 68* 60* 55*   MPV 10.8 10.6 11.8     Recent Labs     04/14/21  1350 04/15/21  0045 04/16/21  0449   SODIUM 136 132* 128*   POTASSIUM 4.1 3.7 3.8   CHLORIDE 102 102 99   CO2 26 25 22   GLUCOSE 122* 113* 159*   BUN 16 21 25*   CREATININE 0.78 0.87 0.85   CALCIUM 8.6 8.0* 8.0*     Recent Labs     04/14/21  1350   INR 1.06               Imaging  DX-CHEST-PORTABLE (1 VIEW)   Final Result         1.  Hazy right pulmonary infiltrates, stable since prior.   2.  Small right pleural effusion   3.  Cardiomegaly      CT-HEAD W/O   Final Result         1.  Status post evacuation of left subdural hematoma. Residual subdural collection is similar  "in thickness to the prior exam with associated sulci effacement, but no midline shift.      2.  Small amount of subarachnoid hemorrhage is unchanged.           Assessment/Plan  * Failure to thrive in adult  Assessment & Plan  Patient was initially recommended to go to a skilled nursing facility versus rehab during the past hospitalization.  It appears the patient denied such services and was discharged home.  He was unable to take care of himself at home reason for which he came back to the ED.  The patient mentions that he was not even able to take his medications.  The patient now mentions he would go to a skilled nursing facility if warranted.  We have consulted PT OT we appreciate further recommendations.    Thrombocytopenia (HCC)- (present on admission)  Assessment & Plan  Patient developed thrombocytopenia during the last hospitalization.  Hematology had been consulted  It was determined to stop the patient's allopurinol as well as Keppra which could cause thrombocytopenia.  Hematology recommended reconsulting them if platelets drop below 50.  We will monitor closely, repeat CBC.    Of note, as per previous discharge summary, during heme workup patient did have positive SPENCER and anti-SSA/RO antibodies, which will require follow up with rheumatology as outpatient, as well as GI follow up for liver disease.    Subdural hematoma (HCC)  Assessment & Plan  Patient was recently admitted and was found to have subdural hematoma.  Neurosurgery had been following.  Repeat CT scan 3/22: 1.2cm extra axial hematoma (from 1cm) with 4.6mm midline shift to right slightly greater compared to prior imaging  S/p craniotomy 3/25    Patient had been discharged and would follow with neurosurgery as an outpatient.    CT head on 4/14/21 reported: \"Status post evacuation of left subdural hematoma. Residual subdural collection is similar in thickness to the prior exam with associated sulci effacement, but no midline shift.\"     On " previous hospitalization patient had been hyponatremic and neurosurgery has ordered fludrocortisone and salt tablets however sodium on admission was 136.  Upon discharge last hospitalization patient was found to have cortisol deficiency and was placed on hydrocortisone therapy.  It is unlikely patient will require fludrocortisone while patient is on hydrocortisone.  We will monitor and make changes accordingly.    PT/OT    Low serum cortisol level (HCC)- (present on admission)  Assessment & Plan  Patient was found to have low serum cortisol level during last hospitalization and was placed on hydrocortisone therapy.  Continue home dose.    History of prosthetic mitral valve- (present on admission)  Assessment & Plan  As per last hospitalization, anticoagulation had been recommended not to continue due to bleeding risk.  As per discharge summary, neurology recommended aspirin in 2 weeks.    COPD (chronic obstructive pulmonary disease) (HCC)- (present on admission)  Assessment & Plan  Patient currently not on exacerbation or requiring oxygen.  We will monitor closely and make changes accordingly.    Gout- (present on admission)  Assessment & Plan  Patient with a history of gout.  The patient currently not complaining of any pain.  Patient's home dose of allopurinol had been stopped during the past hospitalization due to ongoing thrombocytopenia.    Atrial fibrillation (HCC)- (present on admission)  Assessment & Plan  Continue patient's home dose of metoprolol.  Continue telemetry  As per cardiology during the past hospitalization did not recommend restarting anticoagulation at this time.    Anemia  Assessment & Plan  There does not appear to be any active bleeding at this time.  Hemoglobin has remained stable.  We will monitor, repeat CBC and make changes accordingly.    Seizure (HCC)- (present on admission)  Assessment & Plan  Patient developed seizures due to subdural hematoma and was initially placed on Keppra.  Due  to ongoing thrombocytopenia Keppra was discontinued and switched to Vimpat which we will continue at this time.  Seizure precautions.    Alcoholic cirrhosis of liver without ascites (HCC)- (present on admission)  Assessment & Plan  It appears patient was diagnosed with liver cirrhosis during the past hospitalization.  The patient currently not complaining of any abdominal pain  The patient will require outpatient GI follow-up.    Hypertension- (present on admission)  Assessment & Plan  We will continue home dose of metoprolol.  As needed labetalol  Monitor and make changes accordingly.    Pacemaker- (present on admission)  Assessment & Plan  Patient with a history of pacemaker.  This limits the ability to perform MRI.    Obesity (BMI 30-39.9)- (present on admission)  Assessment & Plan  Patient counseled on lifestyle modifications.  He will require close follow-up as an outpatient.    Type 2 diabetes mellitus with complication, without long-term current use of insulin (HCC)- (present on admission)  Assessment & Plan  We will place the patient on insulin sliding scale, hypoglycemia protocol.  We will monitor and make changes accordingly.    Benign prostatic hyperplasia without lower urinary tract symptoms- (present on admission)  Assessment & Plan  Resume home dose of tamsulosin.         VTE prophylaxis: SCD

## 2021-04-16 NOTE — CARE PLAN
Problem: Communication  Goal: The ability to communicate needs accurately and effectively will improve  Outcome: PROGRESSING AS EXPECTED     Problem: Safety  Goal: Will remain free from injury  Outcome: PROGRESSING AS EXPECTED     Problem: Pain Management  Goal: Pain level will decrease to patient's comfort goal  Outcome: PROGRESSING AS EXPECTED     Problem: Respiratory:  Goal: Respiratory status will improve  Outcome: PROGRESSING AS EXPECTED     Problem: Urinary Elimination:  Goal: Ability to reestablish a normal urinary elimination pattern will improve  Outcome: PROGRESSING AS EXPECTED     Problem: Skin Integrity  Goal: Risk for impaired skin integrity will decrease  Outcome: PROGRESSING AS EXPECTED

## 2021-04-17 PROBLEM — J18.9 PNEUMONIA: Status: ACTIVE | Noted: 2021-01-01

## 2021-04-17 NOTE — PROGRESS NOTES
"Pharmacy Kinetics 65 y.o. male on vancomycin day # 1 2021    Currently on Vancomycin 1500 mg iv q12hr (0900, 2100)  Provider specified end date: TBD    Indication for Treatment: unknown source of infection    Pertinent history per medical record: Admitted on 2021 for failure to thrive. Discharged from this facility on  after ~month admission for SDH after falling out of bed. Underwent L craniotomy and evacuation on 3/25. Pt was discharged home instead of to a SNF, reports that he is unable to walk d/t generalized weakness since discharge. Empiric antibiotics initiated after pt developed fever last night that has continued thru this afternoon. Pt last had vancomycin in 2020, 12-hour post-load level was 14.4 mcg/mL. CXR shows hazy right pulmonary infiltrates.    Other antibiotics: cefepime 2 g q8h    Allergies: Patient has no known allergies.     List concerns for renal function: BUN/SCr > 20:1, PMH of alcoholic liver cirrhosis - ALT/AST slightly elevated, albumin 2.6, BMI 30.55    Pertinent cultures to date:   : urine: pending  : blood x2: pending      Recent Labs     21  1350 04/15/21  0045 21  0449   WBC 5.0 3.9* 5.0   NEUTSPOLYS 76.20* 72.70* 72.50*     Recent Labs     21  1350 04/15/21  0045 21  0449   BUN 16 21 25*   CREATININE 0.78 0.87 0.85   ALBUMIN 3.2 2.7* 2.6*       Intake/Output Summary (Last 24 hours) at 2021 1750  Last data filed at 2021 1200  Gross per 24 hour   Intake 240 ml   Output 500 ml   Net -260 ml      /80   Pulse 97   Temp (!) 38.9 °C (102.1 °F) (Temporal)   Resp 20   Ht 1.78 m (5' 10.08\")   Wt 96.8 kg (213 lb 6.5 oz)   SpO2 95%  Temp (24hrs), Av.9 °C (100.3 °F), Min:37.2 °C (98.9 °F), Max:38.9 °C (102.1 °F)      A/P   1. Vancomycin dose change: new start  2. Next vancomycin level: next 24-48 hours (not ordered)  3. Goal trough: 10-22 mcg/mL (will narrow once source of infection identified)  4. Comments: " vancomycin and cefepime initiated empirically for unknown source of infection. Pt does have concerns for accumulation but has shown ability to clear vancomycin in the past. Consistent UOP since admission. Going to start with protocol dose of 15 mg/kg q12h, shooting for the upper end of trough goal given recent prolonged hospital stay, CXR results, and recent craniotomy. Pharmacy will continue to follow.    Get Calderon, PharmD

## 2021-04-17 NOTE — CARE PLAN
Problem: Communication  Goal: The ability to communicate needs accurately and effectively will improve  Outcome: PROGRESSING AS EXPECTED     Problem: Safety  Goal: Will remain free from injury  Outcome: PROGRESSING AS EXPECTED

## 2021-04-17 NOTE — PROGRESS NOTES
Monitor summary: SR 86-88 and paced @80, IA .18, QRS .08, QT .38, intermittently paced per strip from monitor room.

## 2021-04-17 NOTE — CARE PLAN
Problem: Communication  Goal: The ability to communicate needs accurately and effectively will improve  Outcome: PROGRESSING AS EXPECTED     Problem: Safety  Goal: Will remain free from injury  Outcome: PROGRESSING AS EXPECTED  Goal: Will remain free from falls  Outcome: PROGRESSING AS EXPECTED     Problem: Venous Thromboembolism (VTW)/Deep Vein Thrombosis (DVT) Prevention:  Goal: Patient will participate in Venous Thrombosis (VTE)/Deep Vein Thrombosis (DVT)Prevention Measures  Outcome: PROGRESSING AS EXPECTED     Problem: Knowledge Deficit  Goal: Knowledge of disease process/condition, treatment plan, diagnostic tests, and medications will improve  Outcome: PROGRESSING AS EXPECTED     Problem: Bowel/Gastric:  Goal: Normal bowel function is maintained or improved  Outcome: PROGRESSING SLOWER THAN EXPECTED  Goal: Will not experience complications related to bowel motility  Outcome: PROGRESSING SLOWER THAN EXPECTED  Note: Pt feels quite constipated. Suppository offered

## 2021-04-17 NOTE — ASSESSMENT & PLAN NOTE
Reports cough, SOB, weakness, sore throat. Had fever last evening. Afebrile this morning, hemodynamically stable.  CXR showed hazy right pulmonary infiltrates.  Blood cultures from 4/16 negative to date.  Empirically on cefepime + vanco  PCT neg   Abx de-escalated   Oxygen per guidelines

## 2021-04-17 NOTE — PROGRESS NOTES
[facility] Medicine Daily Progress Note    Date of Service  4/17/2021    Chief Complaint  65 y.o. male admitted 4/14/2021 with failure to thrive    Hospital Course  A 65-year-old man with h/o DM, HTN, Afib (not on AC), s/p PPM, gout, COPD, prosthetic mitral valve, low serum cortisol, alcoholic liver cirrhosis, BPH, seizure disorder, subarachnoid hemorrhage, s/p craniotomy, thrombocytopenia presented with generalized weakness. Patient was recently discharged from hospital after not accepting going to a skilled nursing facility. Patient was not able to take care of himself, he now agrees to go to SNF.    Interval Problem Update  4/16: Had fever 38.1 last night, hemodynamically stable.  wbc 5.0, Hb 10.0, plt 55. Na 128 - fluid restriction 1.2L  PT, OT recommended post-acute placement.  CXR, UA, blood cultures ordered.  CXR: hazy right pulmonary infiltrates, stable since prior, small right pleural effusion, cardiomegaly. Incentive spirometry ordered.    4/17: Reports cough, SOB, weakness, sore throat. Had fever last evening. Afebrile this morning, hemodynamically stable.  UA positive for microhematuria  Urine culture from 4/16 negative to date.  CXR showed hazy right pulmonary infiltrates.  Blood cultures from 4/16 negative to date.  Empirically on cefepime + vanco  Na 128->131    Consultants/Specialty  None    Code Status  Full Code    Disposition  TBD  PT, OT recommended post-acute placement.     Review of Systems  Review of Systems   Constitutional: Positive for malaise/fatigue. Negative for chills and fever.   HENT: Negative for hearing loss.    Eyes: Negative.    Respiratory: Negative for cough and shortness of breath.    Cardiovascular: Negative for chest pain.   Gastrointestinal: Negative for abdominal pain and nausea.   Genitourinary: Negative for dysuria.   Musculoskeletal: Positive for back pain and myalgias.   Skin: Positive for rash.        Bruises on arms   Neurological: Positive for weakness and  headaches.     Physical Exam  Temp:  [36.4 °C (97.5 °F)-38.9 °C (102.1 °F)] 36.8 °C (98.3 °F)  Pulse:  [] 110  Resp:  [17-20] 17  BP: (104-135)/(70-85) 129/72  SpO2:  [91 %-95 %] 93 %    Physical Exam  Vitals and nursing note reviewed.   Constitutional:       Appearance: He is ill-appearing.   HENT:      Head: Normocephalic.      Mouth/Throat:      Mouth: Mucous membranes are moist.      Pharynx: Oropharynx is clear.   Eyes:      Pupils: Pupils are equal, round, and reactive to light.   Cardiovascular:      Rate and Rhythm: Normal rate and regular rhythm.      Heart sounds: Normal heart sounds.   Pulmonary:      Effort: Pulmonary effort is normal. No respiratory distress.      Breath sounds: Normal breath sounds.   Abdominal:      General: Abdomen is flat. Bowel sounds are normal.      Tenderness: There is no abdominal tenderness.   Musculoskeletal:         General: Normal range of motion.      Cervical back: Normal range of motion.   Skin:     General: Skin is warm.      Comments: B/l venous stasis changes on shins   Neurological:      General: No focal deficit present.      Mental Status: He is alert and oriented to person, place, and time.      Motor: Weakness present.        Fluids    Intake/Output Summary (Last 24 hours) at 4/17/2021 1351  Last data filed at 4/17/2021 0605  Gross per 24 hour   Intake 1800 ml   Output 350 ml   Net 1450 ml       Laboratory  Recent Labs     04/15/21  0045 04/16/21  0449 04/17/21  0926   WBC 3.9* 5.0 4.9   RBC 3.06* 3.05* 3.08*   HEMOGLOBIN 10.0* 10.0* 10.1*   HEMATOCRIT 30.3* 30.5* 31.6*   MCV 99.0* 100.0* 102.6*   MCH 32.7 32.8 32.8   MCHC 33.0* 32.8* 32.0*   RDW 63.5* 63.0* 63.6*   PLATELETCT 60* 55* 55*   MPV 10.6 11.8 12.2     Recent Labs     04/15/21  0045 04/16/21  0449 04/17/21  0926   SODIUM 132* 128* 131*   POTASSIUM 3.7 3.8 4.1   CHLORIDE 102 99 102   CO2 25 22 20   GLUCOSE 113* 159* 219*   BUN 21 25* 19   CREATININE 0.87 0.85 0.62   CALCIUM 8.0* 8.0* 7.8*                    Imaging  DX-CHEST-PORTABLE (1 VIEW)   Final Result         1.  Hazy right pulmonary infiltrates, stable since prior.   2.  Small right pleural effusion   3.  Cardiomegaly      CT-HEAD W/O   Final Result         1.  Status post evacuation of left subdural hematoma. Residual subdural collection is similar in thickness to the prior exam with associated sulci effacement, but no midline shift.      2.  Small amount of subarachnoid hemorrhage is unchanged.           Assessment/Plan  * Pneumonia  Assessment & Plan  Reports cough, SOB, weakness, sore throat. Had fever last evening. Afebrile this morning, hemodynamically stable.  CXR showed hazy right pulmonary infiltrates.  Blood cultures from 4/16 negative to date.  Empirically on cefepime + vanco  Oxygen per guidelines    Failure to thrive in adult  Assessment & Plan  Patient was initially recommended to go to a skilled nursing facility versus rehab during the past hospitalization.  It appears the patient denied such services and was discharged home.  He was unable to take care of himself at home reason for which he came back to the ED.  The patient mentions that he was not even able to take his medications.  The patient now mentions he would go to a skilled nursing facility if warranted.  We have consulted PT OT we appreciate further recommendations.    Thrombocytopenia (HCC)- (present on admission)  Assessment & Plan  Patient developed thrombocytopenia during the last hospitalization.  Hematology had been consulted  It was determined to stop the patient's allopurinol as well as Keppra which could cause thrombocytopenia.  Hematology recommended reconsulting them if platelets drop below 50.  We will monitor closely  Of note, as per previous discharge summary, during heme workup patient did have positive SPENCER and anti-SSA/RO antibodies, which will require follow up with rheumatology as outpatient, as well as GI follow up for liver disease.    Subdural hematoma  "([facility])  Assessment & Plan  Patient was recently admitted and was found to have subdural hematoma.  Neurosurgery had been following.  Repeat CT scan 3/22: 1.2cm extra axial hematoma (from 1cm) with 4.6mm midline shift to right slightly greater compared to prior imaging  S/p craniotomy 3/25  Patient had been discharged and would follow with neurosurgery as an outpatient.  CT head on 4/14/21 reported: \"Status post evacuation of left subdural hematoma. Residual subdural collection is similar in thickness to the prior exam with associated sulci effacement, but no midline shift.\"   On previous hospitalization patient had been hyponatremic and neurosurgery has ordered fludrocortisone and salt tablets however sodium on admission was 136.  Upon discharge last hospitalization patient was found to have cortisol deficiency and was placed on hydrocortisone therapy.  It is unlikely patient will require fludrocortisone while patient is on hydrocortisone.  We will monitor and make changes accordingly.  PT/OT    Low serum cortisol level ([facility])- (present on admission)  Assessment & Plan  Patient was found to have low serum cortisol level during last hospitalization and was placed on hydrocortisone therapy.  Continue home dose.    History of prosthetic mitral valve- (present on admission)  Assessment & Plan  As per last hospitalization, anticoagulation had been recommended not to continue due to bleeding risk.  As per discharge summary, neurology recommended aspirin in 2 weeks.    COPD (chronic obstructive pulmonary disease) ([facility])- (present on admission)  Assessment & Plan  Patient currently not on exacerbation or requiring oxygen.  We will monitor closely and make changes accordingly.    Gout- (present on admission)  Assessment & Plan  Patient with a history of gout.  The patient currently not complaining of any pain.  Patient's home dose of allopurinol had been stopped during the past hospitalization due to ongoing " thrombocytopenia.    Atrial fibrillation (HCC)- (present on admission)  Assessment & Plan  Continue patient's home dose of metoprolol.  Continue telemetry  As per cardiology during the past hospitalization did not recommend restarting anticoagulation at this time.    Anemia  Assessment & Plan  There does not appear to be any active bleeding at this time.  Hemoglobin has remained stable.  We will monitor, repeat CBC and make changes accordingly.    Seizure (HCC)- (present on admission)  Assessment & Plan  Patient developed seizures due to subdural hematoma and was initially placed on Keppra.  Due to ongoing thrombocytopenia Keppra was discontinued and switched to Vimpat which we will continue at this time.  Seizure precautions.    Alcoholic cirrhosis of liver without ascites (HCC)- (present on admission)  Assessment & Plan  It appears patient was diagnosed with liver cirrhosis during the past hospitalization.  The patient currently not complaining of any abdominal pain  The patient will require outpatient GI follow-up.    Hypertension- (present on admission)  Assessment & Plan  We will continue home dose of metoprolol.  As needed labetalol  Monitor and make changes accordingly.    Pacemaker- (present on admission)  Assessment & Plan  Patient with a history of pacemaker.  This limits the ability to perform MRI.    Obesity (BMI 30-39.9)- (present on admission)  Assessment & Plan  Patient counseled on lifestyle modifications.  He will require close follow-up as an outpatient.    Type 2 diabetes mellitus with complication, without long-term current use of insulin (HCC)- (present on admission)  Assessment & Plan  We will place the patient on insulin sliding scale, hypoglycemia protocol.  We will monitor and make changes accordingly.    Benign prostatic hyperplasia without lower urinary tract symptoms- (present on admission)  Assessment & Plan  Resume home dose of tamsulosin.       VTE prophylaxis: SCD

## 2021-04-17 NOTE — PROGRESS NOTES
"Pharmacy Kinetics 65 y.o. male on vancomycin day # 2 2021    Currently on Vancomycin 1500 mg iv q12hr (0900, 1200)  Provider specified end date: tbd    Indication for Treatment: unknown source of infection, possible PNA    Pertinent history per medical record: Admitted on 2021 for failure to thrive. Of note, patient was discharged from this facility on  around a month-long admission for SDH; she wnderwent L craniotomy and evacuation on 3/25.  Empiric antibiotics were initaite given persistent fever and CXR w/ hazy right pulmonary infiltrates.    Other antibiotics: cefepime 2g iv q8h    Allergies: Patient has no known allergies.     List concerns for renal function: abnormal LFTs, BUN/SCr ratio > 20:1, obesity (32.54 kg/m²), low albumin, hypermetabolic state (SIRS)    Pertinent cultures to date:   ·  BCx: ngtd  ·  UCx: in process    MRSA nares swab if pneumonia is a concern (ordered/positive/negative/n-a): ordered    Recent Labs     21  1350 04/15/21  0045 21  0449 21  0926   WBC 5.0 3.9* 5.0 4.9   NEUTSPOLYS 76.20* 72.70* 72.50* 85.60*     Recent Labs     21  1350 04/15/21  0045 21  0449 21  0926   BUN 16 21 25* 19   CREATININE 0.78 0.87 0.85 0.62   ALBUMIN 3.2 2.7* 2.6* 2.7*     No results for input(s): VANCOTROUGH, VANCOPEAK, VANCORANDOM in the last 72 hours.    Intake/Output Summary (Last 24 hours) at 2021 1230  Last data filed at 2021 0605  Gross per 24 hour   Intake 1800 ml   Output 350 ml   Net 1450 ml      /72   Pulse (!) 110   Temp 36.8 °C (98.3 °F) (Temporal)   Resp 17   Ht 1.78 m (5' 10.08\")   Wt 103 kg (227 lb 4.7 oz)   SpO2 93%  Temp (24hrs), Av.1 °C (98.8 °F), Min:36.4 °C (97.5 °F), Max:38.9 °C (102.1 °F)      A/P   1. Vancomycin dose change: None  2. Next vancomycin level:  @ 0830; ordered  3. Goal trough: 15-20 mcg/mL  4. Comments: Renal function stable; Scr appears to be at baseline, estimated CrCl > 100mL/min, " patient is voiding. Cultures in process, per MD continue. Will continue current dose of vancomycin 15mg/kg iv q12h and check a trough prior to the 3rd maintenance dose. Pharmacy will continue to follow.     Cb LynnD

## 2021-04-18 PROBLEM — N39.0 UTI (URINARY TRACT INFECTION): Status: ACTIVE | Noted: 2021-01-01

## 2021-04-18 PROBLEM — R19.7 DIARRHEA: Status: ACTIVE | Noted: 2021-01-01

## 2021-04-18 NOTE — PROGRESS NOTES
Monitor Summary: Pt is in Afib with Rare PVCs. HR .  QRS 0.10 per strip from monitor room. At the end of the evening, pt sustained over 120.  MD notified new orders received.

## 2021-04-18 NOTE — CARE PLAN
Problem: Communication  Goal: The ability to communicate needs accurately and effectively will improve  Outcome: PROGRESSING AS EXPECTED  Note: Pt communicate needs effectively and accurately, all needs assessed, hourly rounding in place     Problem: Safety  Goal: Will remain free from falls  Outcome: PROGRESSING AS EXPECTED  Note: Treaded slipper socks on, bed in lowest and locked position, bed alarm on, call light within in reach, educated on the need to call for assistance

## 2021-04-18 NOTE — DISCHARGE PLANNING
Anticipated Discharge Disposition: SNF    Action: This RN CM spoke with patient on his cell phone at [phone] to receive SNF choices. Patient wants to go to [facility]. Will notify his MD of request for PMR assessment. Also obtained three SNF choices: 1) [facility], 2) [facility], 3) [facility] in case patient is not accepted at [facility]. Patient states he was accepted at [facility] during his previous hospitalization. Patient made aware he would be notified of pending acceptances and denials.    MD notified of patient's referral request.    Barriers to Discharge: Medical clearance, SNF acceptance, Referral to PMR    Plan: Hospital Care Management will follow-up and assist with discharge planning needs.

## 2021-04-18 NOTE — DISCHARGE PLANNING
Agency/Facility Name: [facility]  Spoke To: Pamela  Outcome: Pamela stated that pt is accepted and can transfer when pt is medically clear

## 2021-04-18 NOTE — ASSESSMENT & PLAN NOTE
Urine culuture from 4/16 positive for Pseudomonas, sensitivity reviewed  Cefepime - de-escalate to ciprofloxacin

## 2021-04-18 NOTE — PROGRESS NOTES
Hospital Medicine Daily Progress Note    Date of Service  4/18/2021    Chief Complaint  65 y.o. male admitted 4/14/2021 with failure to thrive    Hospital Course  A 65-year-old man with h/o DM, HTN, Afib (not on AC), s/p PPM, gout, COPD, prosthetic mitral valve, low serum cortisol, alcoholic liver cirrhosis, BPH, seizure disorder, subarachnoid hemorrhage, s/p craniotomy, thrombocytopenia presented with generalized weakness. Patient was recently discharged from hospital after not accepting going to a skilled nursing facility. Patient was not able to take care of himself, he now agrees to go to SNF.    Interval Problem Update  4/16: Had fever 38.1 last night, hemodynamically stable.  wbc 5.0, Hb 10.0, plt 55. Na 128 - fluid restriction 1.2L  PT, OT recommended post-acute placement.  CXR, UA, blood cultures ordered.  CXR: hazy right pulmonary infiltrates, stable since prior, small right pleural effusion, cardiomegaly. Incentive spirometry ordered.     4/17: Reports cough, SOB, weakness, sore throat. Had fever last evening. Afebrile this morning, hemodynamically stable.  UA positive for microhematuria  Urine culture from 4/16 negative to date.  CXR showed hazy right pulmonary infiltrates.  Blood cultures from 4/16 negative to date.  Empirically on cefepime + vanco  Na 128->131    4/18: Reports headache, cough, chest pain. Had loose stool x3.  Urine culuture from 4/16 positive for Pseudomonas, sensitivity in progress.  Had fever 38.3 overnight, hemodynamically stable.  Na 128, K 3.5 replaced, Mg 1.6, replaced  Afib rate 110-123 on monitor.  Flu negative, COVID 19 from 4/16 negative.  C.diff ordered. ECG, troponin ordered  Repeated ECG showed afib, ventricular-paced rhythm, no significant changed comparing prior ECG    Consultants/Specialty  None    Code Status  Full Code    Disposition  TBD  PT, OT recommended post-acute placement.  SNF    Review of Systems  Review of Systems   Constitutional: Positive  for malaise/fatigue. Negative for chills and fever.   HENT: Negative for hearing loss.    Eyes: Negative.    Respiratory: Negative for cough and shortness of breath.    Cardiovascular: Positive for chest pain.   Gastrointestinal: Negative for abdominal pain and nausea.   Genitourinary: Negative for dysuria.   Musculoskeletal: Positive for back pain and myalgias.   Skin: Positive for rash.        Bruises on arms   Neurological: Positive for weakness and headaches.        Physical Exam  Temp:  [36.2 °C (97.1 °F)-38.3 °C (101 °F)] 36.2 °C (97.1 °F)  Pulse:  [] 62  Resp:  [16-18] 18  BP: (104-132)/(61-92) 122/61  SpO2:  [93 %-97 %] 94 %    Physical Exam  Vitals and nursing note reviewed.   Constitutional:       Appearance: He is ill-appearing.   HENT:      Head: Normocephalic.      Mouth/Throat:      Mouth: Mucous membranes are moist.      Pharynx: Oropharynx is clear.   Eyes:      Pupils: Pupils are equal, round, and reactive to light.   Cardiovascular:      Rate and Rhythm: Normal rate and regular rhythm.      Heart sounds: Normal heart sounds.   Pulmonary:      Effort: Pulmonary effort is normal. No respiratory distress.      Breath sounds: Normal breath sounds.   Abdominal:      General: Abdomen is flat. Bowel sounds are normal.      Tenderness: There is no abdominal tenderness.   Musculoskeletal:         General: Normal range of motion.      Cervical back: Normal range of motion.   Skin:     General: Skin is warm.      Comments: B/l venous stasis changes on shins   Neurological:      General: No focal deficit present.      Mental Status: He is alert and oriented to person, place, and time.      Motor: Weakness present.     Fluids    Intake/Output Summary (Last 24 hours) at 4/18/2021 1016  Last data filed at 4/18/2021 0830  Gross per 24 hour   Intake 840 ml   Output 200 ml   Net 640 ml       Laboratory  Recent Labs     04/16/21  0449 04/17/21  0926 04/18/21  0105   WBC 5.0 4.9 5.3   RBC 3.05* 3.08* 3.08*    HEMOGLOBIN 10.0* 10.1* 10.0*   HEMATOCRIT 30.5* 31.6* 30.0*   .0* 102.6* 97.4   MCH 32.8 32.8 32.5   MCHC 32.8* 32.0* 33.3*   RDW 63.0* 63.6* 60.5*   PLATELETCT 55* 55* 59*   MPV 11.8 12.2 12.6     Recent Labs     04/16/21  0449 04/17/21  0926 04/18/21  0105   SODIUM 128* 131* 128*   POTASSIUM 3.8 4.1 3.5*   CHLORIDE 99 102 100   CO2 22 20 20   GLUCOSE 159* 219* 151*   BUN 25* 19 16   CREATININE 0.85 0.62 0.57   CALCIUM 8.0* 7.8* 7.8*                   Imaging  DX-CHEST-PORTABLE (1 VIEW)   Final Result         1.  Hazy right pulmonary infiltrates, stable since prior.   2.  Small right pleural effusion   3.  Cardiomegaly      CT-HEAD W/O   Final Result         1.  Status post evacuation of left subdural hematoma. Residual subdural collection is similar in thickness to the prior exam with associated sulci effacement, but no midline shift.      2.  Small amount of subarachnoid hemorrhage is unchanged.           Assessment/Plan  * Pneumonia  Assessment & Plan  Reports cough, SOB, weakness, sore throat. Had fever last evening. Afebrile this morning, hemodynamically stable.  CXR showed hazy right pulmonary infiltrates.  Blood cultures from 4/16 negative to date.  Empirically on cefepime + vanco  Oxygen per guidelines    Failure to thrive in adult  Assessment & Plan  Patient was initially recommended to go to a skilled nursing facility versus rehab during the past hospitalization.  It appears the patient denied such services and was discharged home.  He was unable to take care of himself at home reason for which he came back to the ED.  The patient mentions that he was not even able to take his medications.  The patient now mentions he would go to a skilled nursing facility if warranted.  We have consulted PT OT we appreciate further recommendations.    Thrombocytopenia (HCC)- (present on admission)  Assessment & Plan  Patient developed thrombocytopenia during the last hospitalization.  Hematology had been  "consulted  It was determined to stop the patient's allopurinol as well as Keppra which could cause thrombocytopenia.  Hematology recommended reconsulting them if platelets drop below 50.  We will monitor closely  Of note, as per previous discharge summary, during heme workup patient did have positive SPENCER and anti-SSA/RO antibodies, which will require follow up with rheumatology as outpatient, as well as GI follow up for liver disease.    Subdural hematoma (HCC)  Assessment & Plan  Patient was recently admitted and was found to have subdural hematoma.  Neurosurgery had been following.  Repeat CT scan 3/22: 1.2cm extra axial hematoma (from 1cm) with 4.6mm midline shift to right slightly greater compared to prior imaging  S/p craniotomy 3/25  Patient had been discharged and would follow with neurosurgery as an outpatient.  CT head on 4/14/21 reported: \"Status post evacuation of left subdural hematoma. Residual subdural collection is similar in thickness to the prior exam with associated sulci effacement, but no midline shift.\"   On previous hospitalization patient had been hyponatremic and neurosurgery has ordered fludrocortisone and salt tablets however sodium on admission was 136.  Upon discharge last hospitalization patient was found to have cortisol deficiency and was placed on hydrocortisone therapy.  It is unlikely patient will require fludrocortisone while patient is on hydrocortisone.  We will monitor and make changes accordingly.  PT/OT    Low serum cortisol level (HCC)- (present on admission)  Assessment & Plan  Patient was found to have low serum cortisol level during last hospitalization and was placed on hydrocortisone therapy.  Continue home dose.    History of prosthetic mitral valve- (present on admission)  Assessment & Plan  As per last hospitalization, anticoagulation had been recommended not to continue due to bleeding risk.  As per discharge summary, neurology recommended aspirin in 2 weeks.    COPD " (chronic obstructive pulmonary disease) (HCC)- (present on admission)  Assessment & Plan  Patient currently not on exacerbation or requiring oxygen.  We will monitor closely and make changes accordingly.    Gout- (present on admission)  Assessment & Plan  Patient with a history of gout.  The patient currently not complaining of any pain.  Patient's home dose of allopurinol had been stopped during the past hospitalization due to ongoing thrombocytopenia.    Atrial fibrillation (HCC)- (present on admission)  Assessment & Plan  Continue patient's home dose of metoprolol.  Continue telemetry  As per cardiology during the past hospitalization did not recommend restarting anticoagulation at this time.    Diarrhea  Assessment & Plan  Had loose stool x3  Check for C.diff  Stool culture, wbc    UTI (urinary tract infection)  Assessment & Plan  Urine culuture from 4/16 positive for Pseudomonas, sensitivity in progress.  Continue cefepime    Anemia  Assessment & Plan  There does not appear to be any active bleeding at this time.  Hemoglobin has remained stable.  We will monitor, repeat CBC and make changes accordingly.    Seizure (HCC)- (present on admission)  Assessment & Plan  Patient developed seizures due to subdural hematoma and was initially placed on Keppra.  Due to ongoing thrombocytopenia Keppra was discontinued and switched to Vimpat which we will continue at this time.  Seizure precautions.    Alcoholic cirrhosis of liver without ascites (HCC)- (present on admission)  Assessment & Plan  It appears patient was diagnosed with liver cirrhosis during the past hospitalization.  The patient currently not complaining of any abdominal pain  The patient will require outpatient GI follow-up.    Hypertension- (present on admission)  Assessment & Plan  We will continue home dose of metoprolol.  As needed labetalol  Monitor and make changes accordingly.    Pacemaker- (present on admission)  Assessment & Plan  Patient with a  history of pacemaker.  This limits the ability to perform MRI.    Obesity (BMI 30-39.9)- (present on admission)  Assessment & Plan  Patient counseled on lifestyle modifications.  He will require close follow-up as an outpatient.    Type 2 diabetes mellitus with complication, without long-term current use of insulin (HCC)- (present on admission)  Assessment & Plan  We will place the patient on insulin sliding scale, hypoglycemia protocol.  We will monitor and make changes accordingly.    Benign prostatic hyperplasia without lower urinary tract symptoms- (present on admission)  Assessment & Plan  Resume home dose of tamsulosin.       VTE prophylaxis: SCD

## 2021-04-18 NOTE — DISCHARGE PLANNING
.Received Choice form at 4015  Agency/Facility Name: 1)[facility], 2)[facility], 3)[facility]  Referral sent per Choice form @ 4151

## 2021-04-19 NOTE — PROGRESS NOTES
Monitor Summary: paced 80-90,a-fib , with in&out paced/a-fib,rare PVC&PAC per strip from monitor room

## 2021-04-19 NOTE — CARE PLAN
"  Problem: Safety  Goal: Will remain free from injury  Outcome: PROGRESSING AS EXPECTED  Note: Bed alarm and safety precautions in place. Patient calls appropriately for assistance.      Problem: Pain Management  Goal: Pain level will decrease to patient's comfort goal  Outcome: PROGRESSING AS EXPECTED  Note: Patient pain is controlled with PRN medications.      Problem: Infection  Goal: Will remain free from infection  Outcome: PROGRESSING SLOWER THAN EXPECTED  Note: Patient continues to feel \"bad\", nausea, SOB at times. Antibiotics on board, encouraging C&DB.      "

## 2021-04-19 NOTE — CARE PLAN
Problem: Communication  Goal: The ability to communicate needs accurately and effectively will improve  Outcome: PROGRESSING AS EXPECTED     Problem: Safety  Goal: Will remain free from falls  Outcome: PROGRESSING AS EXPECTED  Note: Treaded slipper socks on, bed in lowest and tex position, bed alarm on, educated pt on the need to call for assistance, call light within reach     Problem: Knowledge Deficit  Goal: Knowledge of disease process/condition, treatment plan, diagnostic tests, and medications will improve  Outcome: PROGRESSING AS EXPECTED  Note: Pt understands Dx, asks appropriate questions and actively participates in care

## 2021-04-19 NOTE — THERAPY
Physical Therapy   Daily Treatment     Patient Name: Morales Olivier  Age:  65 y.o., Sex:  male  Medical Record #: [id number]  Today's Date: 4/19/2021     Precautions: Fall Risk    Assessment    Arrived to treat and shortly after PT commenced, transport arrived to take pt to CT. Therefore, provided pt with a brief PT session to include bed mobility, safety with transfers and sidestepping along bed for better positioning once supine. Pt requires 100% cueing for walker management as he tends to walk outside the boundaries of the walker. Instructed in bed mobility w/o use of bed rail as per his home environment however he declined the cues and preferred to use the bed rail. As per the previous PT notes, he is not safe to be alone given his poor insight into safety and short term memory deficits.     Plan    Continue current treatment plan.    DC Equipment Recommendations: None  Discharge Recommendations: Recommend post-acute placement for additional physical therapy services prior to discharge home      Subjective  Agreeable to PT, repeating statements      Objective       04/19/21 8704   Other Treatments   Other Treatments Provided initiated PT and pt then needing to go to CT when transport arrived. Able to work briefly on bed mobility and sidestepping along bed to reposition after laying down   Balance   Sitting Balance (Static) Fair +   Sitting Balance (Dynamic) Fair   Standing Balance (Static) Fair   Standing Balance (Dynamic) Fair   Comments with FWW   Gait Analysis   Gait Level Of Assist Minimal Assist   Assistive Device Front Wheel Walker   Distance (Feet) 5   # of Times Distance was Traveled 1   Skilled Intervention Verbal Cuing;Tactile Cuing   Comments sidesteps along bed; cues for walker management as he tends to walk outside the boundaries of the walker and had poor safety awareness with transfers   Bed Mobility    Supine to Sit Minimal Assist   Scooting Modified Independent  (extra time)   Skilled  Intervention Verbal Cuing   Comments used side rail; cued for technique to sit EOB w/o side rail as per his home environment however he declined the strategies preferring to use the bed accessories   Functional Mobility   Sit to Stand Supervised   Transfer Method Stand Step   Mobility along bed for repositioning   How much help from another person does the patient currently need...   6 clicks Mobility Score 15   Short Term Goals    Short Term Goal # 1 Pt will ambulate 150ft wtih FWW and SPV in 6 visits to return to prior level of function   Goal Outcome # 1 goal not met     Naila Vargas, PT

## 2021-04-19 NOTE — PROGRESS NOTES
Hospital Medicine Daily Progress Note    Date of Service  4/19/2021    Chief Complaint  65 y.o. male admitted 4/14/2021 with failure to thrive    Hospital Course  A 65-year-old man with h/o DM, HTN, Afib (not on AC), s/p PPM, gout, COPD, prosthetic mitral valve, low serum cortisol, alcoholic liver cirrhosis, BPH, seizure disorder, subarachnoid hemorrhage, s/p craniotomy, thrombocytopenia presented with generalized weakness. Patient was recently discharged from hospital after not accepting going to a skilled nursing facility. Patient was not able to take care of himself, he now agrees to go to SNF.    Interval Problem Update  4/16: Had fever 38.1 last night, hemodynamically stable.  wbc 5.0, Hb 10.0, plt 55. Na 128 - fluid restriction 1.2L  PT, OT recommended post-acute placement.  CXR, UA, blood cultures ordered.  CXR: hazy right pulmonary infiltrates, stable since prior, small right pleural effusion, cardiomegaly. Incentive spirometry ordered.     4/17: Reports cough, SOB, weakness, sore throat. Had fever last evening. Afebrile this morning, hemodynamically stable.  UA positive for microhematuria  Urine culture from 4/16 negative to date.  CXR showed hazy right pulmonary infiltrates.  Blood cultures from 4/16 negative to date.  Empirically on cefepime + vanco  Na 128->131    4/18: Reports headache, cough, chest pain. Had loose stool x3.  Urine culuture from 4/16 positive for Pseudomonas, sensitivity in progress.  Had fever 38.3 overnight, hemodynamically stable.  Na 128, K 3.5 replaced, Mg 1.6, replaced  Afib rate 110-123 on monitor.  Flu negative, COVID 19 from 4/16 negative.  C.diff ordered. ECG, troponin ordered  Repeated ECG showed afib, ventricular-paced rhythm, no significant changed comparing prior ECG    4/19: Patient reports cough, with clear mucus, SOB, back pain, headache, nausea. Had fever 38.3 last night. Hemodynamically stable.  C.diff negative.  WBC 5.5, plt 98 improving  Repeat- CT  head.    Consultants/Specialty  None    Code Status  Full Code    Disposition  TBD  PT, OT recommended post-acute placement.  SNF    Review of Systems  Constitutional: Positive for malaise/fatigue. Negative for chills and fever.   HENT: Negative for hearing loss.    Eyes: Negative.    Respiratory: Negative for cough and shortness of breath.    Cardiovascular: Positive for chest pain.   Gastrointestinal: Negative for abdominal pain and nausea.   Genitourinary: Negative for dysuria.   Musculoskeletal: Positive for back pain and myalgias.   Skin: Positive for rash.        Bruises on arms   Neurological: Positive for weakness and headaches.      Physical Exam  Temp:  [36.5 °C (97.7 °F)-38.3 °C (100.9 °F)] 37.2 °C (98.9 °F)  Pulse:  [] 91  Resp:  [17-33] 18  BP: ()/(61-95) 100/69  SpO2:  [90 %-98 %] 90 %    Physical Exam  Vitals and nursing note reviewed.   Constitutional:       Appearance: He is ill-appearing.   HENT:      Head: Normocephalic.      Mouth/Throat:      Mouth: Mucous membranes are moist.      Pharynx: Oropharynx is clear.   Eyes:      Pupils: Pupils are equal, round, and reactive to light.   Cardiovascular:      Rate and Rhythm: Normal rate and regular rhythm.      Heart sounds: Normal heart sounds.   Pulmonary:      Effort: Pulmonary effort is normal. No respiratory distress.      Breath sounds: Normal breath sounds.   Abdominal:      General: Abdomen is flat. Bowel sounds are normal.      Tenderness: There is no abdominal tenderness.   Musculoskeletal:         General: Normal range of motion.      Cervical back: Normal range of motion.   Skin:     General: Skin is warm.      Comments: B/l venous stasis changes on shins   Neurological:      General: No focal deficit present.      Mental Status: He is alert and oriented to person, place, and time.      Motor: Weakness present.      Fluids    Intake/Output Summary (Last 24 hours) at 4/19/2021 1412  Last data filed at 4/19/2021 1300  Gross per 24  hour   Intake 1200 ml   Output 50 ml   Net 1150 ml       Laboratory  Recent Labs     04/17/21  0926 04/18/21  0105 04/19/21  0752   WBC 4.9 5.3 5.5   RBC 3.08* 3.08* 3.28*   HEMOGLOBIN 10.1* 10.0* 10.5*   HEMATOCRIT 31.6* 30.0* 33.4*   .6* 97.4 101.8*   MCH 32.8 32.5 32.0   MCHC 32.0* 33.3* 31.4*   RDW 63.6* 60.5* 63.7*   PLATELETCT 55* 59* 98*   MPV 12.2 12.6 12.1     Recent Labs     04/17/21  0926 04/18/21  0105 04/19/21  0752   SODIUM 131* 128* 136   POTASSIUM 4.1 3.5* 3.8   CHLORIDE 102 100 102   CO2 20 20 27   GLUCOSE 219* 151* 120*   BUN 19 16 15   CREATININE 0.62 0.57 0.74   CALCIUM 7.8* 7.8* 8.2*                   Imaging  DX-CHEST-PORTABLE (1 VIEW)   Final Result         1.  Hazy right pulmonary infiltrates, stable since prior.   2.  Small right pleural effusion   3.  Cardiomegaly      CT-HEAD W/O   Final Result         1.  Status post evacuation of left subdural hematoma. Residual subdural collection is similar in thickness to the prior exam with associated sulci effacement, but no midline shift.      2.  Small amount of subarachnoid hemorrhage is unchanged.           Assessment/Plan  * Pneumonia  Assessment & Plan  Reports cough, SOB, weakness, sore throat. Had fever last evening. Afebrile this morning, hemodynamically stable.  CXR showed hazy right pulmonary infiltrates.  Blood cultures from 4/16 negative to date.  Empirically on cefepime + vanco  Oxygen per guidelines    Failure to thrive in adult  Assessment & Plan  Patient was initially recommended to go to a skilled nursing facility versus rehab during the past hospitalization.  It appears the patient denied such services and was discharged home.  He was unable to take care of himself at home reason for which he came back to the ED.  The patient mentions that he was not even able to take his medications.  The patient now mentions he would go to a skilled nursing facility if warranted.  We have consulted PT OT we appreciate further  "recommendations.    Thrombocytopenia (HCC)- (present on admission)  Assessment & Plan  Patient developed thrombocytopenia during the last hospitalization.  Hematology had been consulted  It was determined to stop the patient's allopurinol as well as Keppra which could cause thrombocytopenia.  Hematology recommended reconsulting them if platelets drop below 50.  We will monitor closely  Of note, as per previous discharge summary, during heme workup patient did have positive SPENCER and anti-SSA/RO antibodies, which will require follow up with rheumatology as outpatient, as well as GI follow up for liver disease.    Subdural hematoma (HCC)  Assessment & Plan  Patient was recently admitted and was found to have subdural hematoma.  Neurosurgery had been following.  Repeat CT scan 3/22: 1.2cm extra axial hematoma (from 1cm) with 4.6mm midline shift to right slightly greater compared to prior imaging  S/p craniotomy 3/25  Patient had been discharged and would follow with neurosurgery as an outpatient.  CT head on 4/14/21 reported: \"Status post evacuation of left subdural hematoma. Residual subdural collection is similar in thickness to the prior exam with associated sulci effacement, but no midline shift.\"   On previous hospitalization patient had been hyponatremic and neurosurgery has ordered fludrocortisone and salt tablets however sodium on admission was 136.  Upon discharge last hospitalization patient was found to have cortisol deficiency and was placed on hydrocortisone therapy.  It is unlikely patient will require fludrocortisone while patient is on hydrocortisone.  We will monitor and make changes accordingly.  PT/OT    Low serum cortisol level (HCC)- (present on admission)  Assessment & Plan  Patient was found to have low serum cortisol level during last hospitalization and was placed on hydrocortisone therapy.  Continue home dose.    History of prosthetic mitral valve- (present on admission)  Assessment & Plan  As " per last hospitalization, anticoagulation had been recommended not to continue due to bleeding risk.  As per discharge summary, neurology recommended aspirin in 2 weeks.    COPD (chronic obstructive pulmonary disease) (HCC)- (present on admission)  Assessment & Plan  Patient currently not on exacerbation or requiring oxygen.  We will monitor closely and make changes accordingly.    Gout- (present on admission)  Assessment & Plan  Patient with a history of gout.  The patient currently not complaining of any pain.  Patient's home dose of allopurinol had been stopped during the past hospitalization due to ongoing thrombocytopenia.    Atrial fibrillation (HCC)- (present on admission)  Assessment & Plan  Continue patient's home dose of metoprolol.  Continue telemetry  As per cardiology during the past hospitalization did not recommend restarting anticoagulation at this time.    Diarrhea  Assessment & Plan  Had loose stool x3  Check for C.diff  Stool culture, wbc    UTI (urinary tract infection)  Assessment & Plan  Urine culuture from 4/16 positive for Pseudomonas, sensitivity in progress.  Continue cefepime    Anemia  Assessment & Plan  There does not appear to be any active bleeding at this time.  Hemoglobin has remained stable.  We will monitor, repeat CBC and make changes accordingly.    Seizure (HCC)- (present on admission)  Assessment & Plan  Patient developed seizures due to subdural hematoma and was initially placed on Keppra.  Due to ongoing thrombocytopenia Keppra was discontinued and switched to Vimpat which we will continue at this time.  Seizure precautions.    Alcoholic cirrhosis of liver without ascites (HCC)- (present on admission)  Assessment & Plan  It appears patient was diagnosed with liver cirrhosis during the past hospitalization.  The patient currently not complaining of any abdominal pain  The patient will require outpatient GI follow-up.    Hypertension- (present on admission)  Assessment &  Plan  We will continue home dose of metoprolol.  As needed labetalol  Monitor and make changes accordingly.    Pacemaker- (present on admission)  Assessment & Plan  Patient with a history of pacemaker.  This limits the ability to perform MRI.    Obesity (BMI 30-39.9)- (present on admission)  Assessment & Plan  Patient counseled on lifestyle modifications.  He will require close follow-up as an outpatient.    Type 2 diabetes mellitus with complication, without long-term current use of insulin ([facility])- (present on admission)  Assessment & Plan  We will place the patient on insulin sliding scale, hypoglycemia protocol.  We will monitor and make changes accordingly.    Benign prostatic hyperplasia without lower urinary tract symptoms- (present on admission)  Assessment & Plan  Resume home dose of tamsulosin.         VTE prophylaxis: SCD

## 2021-04-20 NOTE — DISCHARGE PLANNING
Agency/Facility Name: [facility]  Spoke To: Pamela   Outcome: Per Pamela unable to take patient today. Pamela can take patient tomorrow at 1100 via their transport.

## 2021-04-20 NOTE — CARE PLAN
Problem: Safety  Goal: Will remain free from injury  Outcome: PROGRESSING AS EXPECTED  Note: Fall precautions and safety measures in place. Patient calls appropriately for assistance.      Problem: Mobility  Goal: Risk for activity intolerance will decrease  Outcome: PROGRESSING AS EXPECTED  Note: Patient getting OOB to use bathroom and for meals.      Problem: Pain Management  Goal: Pain level will decrease to patient's comfort goal  Outcome: PROGRESSING SLOWER THAN EXPECTED  Note: Patient complains of HA, only somewhat relieved with PRN medications. Emotional support and distraction provided. Repeat head CT stable ICH.

## 2021-04-20 NOTE — PROGRESS NOTES
Monitor summary:   Rhythm Afib, LA N/A, QRS 0.08, QT 0.30, Ectopy N/A ; per strip from monitor room.

## 2021-04-21 NOTE — DISCHARGE INSTRUCTIONS
Discharge Instructions    Discharged to other by medical transportation with escort. Discharged via ambulance, hospital escort: Yes.  Special equipment needed: Not Applicable    Be sure to schedule a follow-up appointment with your primary care doctor or any specialists as instructed.     Discharge Plan:        I understand that a diet low in cholesterol, fat, and sodium is recommended for good health. Unless I have been given specific instructions below for another diet, I accept this instruction as my diet prescription.   Other diet: Easy to chew, diabetic diet. 1200ml fluid restriction.     Special Instructions: None    · Is patient discharged on Warfarin / Coumadin?   No     Depression / Suicide Risk    As you are discharged from this [facility] facility, it is important to learn how to keep safe from harming yourself.    Recognize the warning signs:  · Abrupt changes in personality, positive or negative- including increase in energy   · Giving away possessions  · Change in eating patterns- significant weight changes-  positive or negative  · Change in sleeping patterns- unable to sleep or sleeping all the time   · Unwillingness or inability to communicate  · Depression  · Unusual sadness, discouragement and loneliness  · Talk of wanting to die  · Neglect of personal appearance   · Rebelliousness- reckless behavior  · Withdrawal from people/activities they love  · Confusion- inability to concentrate     If you or a loved one observes any of these behaviors or has concerns about self-harm, here's what you can do:  · Talk about it- your feelings and reasons for harming yourself  · Remove any means that you might use to hurt yourself (examples: pills, rope, extension cords, firearm)  · Get professional help from the community (Mental Health, Substance Abuse, psychological counseling)  · Do not be alone:Call your Safe Contact- someone whom you trust who will be there for you.  · Call your local CRISIS HOTLINE  720-3807 or 843-020-3693  · Call your local Children's Mobile Crisis Response Team [place] (457) 496-9480 or [email or web address].[place]  · Call the toll free National Suicide Prevention Hotlines   · National Suicide Prevention Lifeline 770-738-KMYW (7538)  · National Spool Line Network 800-SUICIDE (128-1822)

## 2021-04-21 NOTE — DISCHARGE PLANNING
Anticipated Discharge Disposition:   [facility] for rehab    Action:    RN BRENDA spoke to patient yesterday  afternoon and informed him that [facility] accepted him for rehab.  Pt agreeable after discussion.  He stated he needed rehab and felt bad he had not taken the opportunity at last admission as he cannot walk well.     Pt accepted to [facility] and bed available today.  Verbal for Cobra obtained from patient.      Cobra signed by Dr. Goodson.    Request to PFA Madyson to please inform [facility] admissions of patient's referrals to cardiology, endocrinology and rheumatology.  Referrals printed and given to PFA.    Barriers to Discharge:    None    Plan:    Transport scheduled for today at 1100 via [facility].

## 2021-04-21 NOTE — DISCHARGE SUMMARY
Discharge Summary    CHIEF COMPLAINT ON ADMISSION  Chief Complaint   Patient presents with   • Failure to Thrive     Brought in by EMS from home.  DC from [facility] yesterday after a brain bleed.  Found out he is unable to care for himself at home.  Hurts all over.  Needs help-unable to ambulate       Reason for Admission  Generalized weakness, unable to care for self post hospitalization    Admission Date  4/14/2021    CODE STATUS  Full Code    HPI & HOSPITAL COURSE  A 65-year-old man with h/o DM, HTN, Afib (not on AC), s/p PPM, gout, COPD, prosthetic mitral valve, low serum cortisol, alcoholic liver cirrhosis, BPH, seizure disorder, subarachnoid hemorrhage, s/p craniotomy, thrombocytopenia presented 4/14/2021 with generalized weakness. Patient was recently discharged from hospital after not accepting a referral to a skilled nursing facility. Patient was not able to take care of himself, he now agrees to go to SNF.    Of note, the patient had a lengthy hospitalization recently that was discharged 4/13/2021.  The patient was found to have subarachnoid hemorrhage eventually needed craniotomy and evacuation by neurosurgery.  He developed seizures for he was placed on Keppra but later switched to Vimpat due to ongoing thrombocytopenia.  Hematology was consulted for thrombocytopenia and multiple studies were made during the last hospitalization.  It was concluded that the patient thrombocytopenia possibly due to Keppra and allopurinol on top of liver disease, thought to have cirrhosis due to previous heavy alcohol abuse.  As per chart review and discharge summary patient had positive SPENCER and anti-SSA/RO antibodies for which she will require outpatient follow-up with rheumatology.  It was recommended during last hospitalization the patient not to resume anticoagulation due to high risk of bleeding.  Patient was also found to have low cortisol level for which he was placed on hydrocortisone therapy, and he will require  endocrine referral as well.     4/15: course was unremarkable; SNF referral placed after PT/OT eval    4/16: Had fever 38.1 over night, hemodynamically stable. Na 128 - fluid restriction 1.2L UA, blood cultures ordered.  CXR: hazy right pulmonary infiltrates, stable since prior, small right pleural effusion,     4/17: Reports cough, SOB, weakness, sore throat. Had fever again last evening.   UA positive for microhematuria. Urine culture from 4/16 negative to date.   Blood cultures from 4/16 negative to date.  Empirically on cefepime + vanco  Na 128->131     4/18: Reports headache, cough, chest pain. Had loose stool x3.  Urine culuture from 4/16 positive for Pseudomonas, sensitivity in progress.  Had fever 38.3 overnight, hemodynamically stable.  Na 128; electrolytes supplemented   Afib rate 110-123 on monitor.  Flu negative, COVID 19 from 4/16 negative.  C.diff ordered.  Repeated ECG showed afib, ventricular-paced rhythm, no significant changed comparing prior ECG     4/19: Patient reports cough, with clear mucus, SOB, back pain, headache, nausea. Had fever 38.3 last night. Hemodynamically stable.  C.diff negative. Repeat- CT head - no acute abnormalities. Stable small left SAH.     4/20: afebrile with stable; still have complains such as headache and cough.  Discussed plan for a rehab discussed. UCx sensitives reviewed - discussed with clinical pharmacist and Abx switched to ciprofloxacin.     4/21: continued to be afebrile with vitals within normal parameters. At this point, plan is to transition to the next phase of care at a skilled nursing facility. Pt to complete Abx course on 4/23. Spoke with CM/SW team to help connect with community resources for referral placed upon last discharge on 4/13.     Therefore, he is discharged in fair and stable condition to skilled nursing facility.    The patient met 2-midnight criteria for an inpatient stay at the time of discharge.      * Pneumonia  Assessment & Plan  Reports  "cough, SOB, weakness, sore throat. Had fever last evening. Afebrile this morning, hemodynamically stable.  CXR showed hazy right pulmonary infiltrates.  Blood cultures from 4/16 negative to date.  Empirically on cefepime + vanco  In Room air and has received 5 days of Abx  PCT neg   Abx de-escalated   Oxygen per guidelines     Failure to thrive in adult  Assessment & Plan  Patient was initially recommended to go to a skilled nursing facility versus rehab during the past hospitalization.  It appears the patient denied such services and was discharged home.  He was unable to take care of himself at home reason for which he came back to the ED.  The patient mentions that he was not even able to take his medications.  The patient now mentions he would go to a skilled nursing facility if warranted.  PT/OT recommended SNF     Thrombocytopenia (HCC)- (present on admission)  Assessment & Plan  Patient developed thrombocytopenia during the last hospitalization.  Hematology had been consulted  It was determined to stop the patient's allopurinol as well as Keppra which could cause thrombocytopenia.  Hematology recommended reconsulting them if platelets drop below 50.  We will monitor closely  Of note, as per previous discharge summary, during heme workup patient did have positive SPENCER and anti-SSA/RO antibodies, which will require follow up with rheumatology as outpatient, as well as GI follow up for liver disease.     Subdural hematoma (HCC)  Assessment & Plan  Patient was recently admitted and was found to have subdural hematoma.  Neurosurgery had been following.  Repeat CT scan 3/22: 1.2cm extra axial hematoma (from 1cm) with 4.6mm midline shift to right slightly greater compared to prior imaging  S/p craniotomy 3/25  Patient had been discharged and would follow with neurosurgery as an outpatient.  CT head on 4/14/21 reported: \"Status post evacuation of left subdural hematoma. Residual subdural collection is similar in " "thickness to the prior exam with associated sulci effacement, but no midline shift.\"   On previous hospitalization patient had been hyponatremic and neurosurgery has ordered fludrocortisone and salt tablets however sodium on admission was 136.  Upon discharge last hospitalization patient was found to have cortisol deficiency and was placed on hydrocortisone therapy.  It is unlikely patient will require fludrocortisone while patient is on hydrocortisone.  We will monitor and make changes accordingly.  PT/OT     Repeat CT head 4/19 with stable small SAH     Low serum cortisol level (HCC)- (present on admission)  Assessment & Plan  Patient was found to have low serum cortisol level during last hospitalization and was placed on hydrocortisone therapy.  Continue home dose.  Will need outpatient endocrinology follow up     History of prosthetic mitral valve- (present on admission)  Assessment & Plan  As per last hospitalization, anticoagulation had been recommended not to continue due to bleeding risk.  As per discharge summary, neurology recommended aspirin in 2 weeks (4/27/2021)     COPD (chronic obstructive pulmonary disease) (HCC)- (present on admission)  Assessment & Plan  Patient currently not on exacerbation or requiring oxygen.     Gout- (present on admission)  Assessment & Plan  Patient with a history of gout.  The patient currently not complaining of any pain.  Patient's home dose of allopurinol had been stopped during the past hospitalization due to ongoing thrombocytopenia.     Atrial fibrillation (HCC)- (present on admission)  Assessment & Plan  Continue patient's home dose of metoprolol.  Continue telemetry  As per cardiology during the past hospitalization did not recommend restarting anticoagulation at this time.     Diarrhea  Assessment & Plan  Had loose stool x3  C. Diff PCR sent - not detected, stool WBC not seen      UTI (urinary tract infection)  Assessment & Plan  Urine culuture from 4/16 positive " for Pseudomonas, sensitivity reviewed  Cefepime - de-escalate to ciprofloxacin      Anemia  Assessment & Plan  There does not appear to be any active bleeding at this time.  Hemoglobin has remained stable.    Seizure (HCC)- (present on admission)  Assessment & Plan  Patient developed seizures due to subdural hematoma and was initially placed on Keppra.  Due to ongoing thrombocytopenia Keppra was discontinued and switched to Vimpat which we will continue at this time.  Seizure precautions.     Alcoholic cirrhosis of liver without ascites (HCC)- (present on admission)  Assessment & Plan  It appears patient was diagnosed with liver cirrhosis during the past hospitalization.  The patient currently not complaining of any abdominal pain  The patient will require outpatient GI follow-up.     Hypertension- (present on admission)  Assessment & Plan  home dose of metoprolol.  As needed labetalol  Monitor and make changes accordingly.     Pacemaker- (present on admission)  Assessment & Plan  Patient with a history of pacemaker.  This limits the ability to perform MRI.     Obesity (BMI 30-39.9)- (present on admission)  Assessment & Plan  Patient counseled on lifestyle modifications.  He will require close follow-up as an outpatient.     Type 2 diabetes mellitus with complication, without long-term current use of insulin (HCC)- (present on admission)  Assessment & Plan  We will place the patient on insulin sliding scale, hypoglycemia protocol.  We will monitor and make changes accordingly.     Benign prostatic hyperplasia without lower urinary tract symptoms- (present on admission)  Assessment & Plan  Resume home dose of tamsulosin.         Discharge Date  04/21/21    FOLLOW UP ITEMS POST DISCHARGE  N/A    Physical Exam  Vitals and nursing note reviewed.   Constitutional:       Appearance: He is ill-appearing.   HENT:      Head: Normocephalic.      Mouth/Throat:      Mouth: Mucous membranes are moist.      Pharynx: Oropharynx is  clear.   Eyes:      Pupils: Pupils are equal, round, and reactive to light.   Cardiovascular:      Rate and Rhythm: Normal rate and regular rhythm.      Heart sounds: Normal heart sounds.   Pulmonary:      Effort: Pulmonary effort is normal. No respiratory distress.      Breath sounds: Normal breath sounds.   Abdominal:      General: Abdomen is flat. Bowel sounds are normal.      Tenderness: There is no abdominal tenderness.   Musculoskeletal:         General: Normal range of motion.      Cervical back: Normal range of motion.   Skin:     General: Skin is warm.      Comments: B/l venous stasis changes on shins   Neurological:      General: No focal deficit present.      Mental Status: He is alert and oriented to person, place, and time.      Motor: Weakness present.     DISCHARGE DIAGNOSES  Principal Problem:    Pneumonia POA: Yes  Active Problems:    Subdural hematoma (HCC) POA: Yes    Thrombocytopenia (HCC) POA: Yes    Failure to thrive in adult POA: Yes    Atrial fibrillation (HCC) POA: Yes    Gout POA: Yes    COPD (chronic obstructive pulmonary disease) (HCC) POA: Yes    History of prosthetic mitral valve POA: Yes    Low serum cortisol level (HCC) POA: Yes    Hypertension POA: Yes    Alcoholic cirrhosis of liver without ascites (HCC) POA: Yes    Seizure (HCC) POA: Yes    Anemia POA: Yes    UTI (urinary tract infection) POA: Yes    Diarrhea POA: Yes    Benign prostatic hyperplasia without lower urinary tract symptoms POA: Yes    Type 2 diabetes mellitus with complication, without long-term current use of insulin (HCC) POA: Yes    Obesity (BMI 30-39.9) POA: Yes    Pacemaker POA: Yes  Resolved Problems:    * No resolved hospital problems. *      FOLLOW UP  Neurology follow up (epilepsy clinic)  Cardiology follow up  PCP follow up: monitor CBC (plt < 50 refer to hematology), LFTs, sodium levels and sugars, can restart aspirin in 2 weeks, holding AC and allopurinol  GI referral for transaminitis  Rheum referral for  abnormal ab testing  Endocrine referral for low cortisol    MEDICATIONS ON DISCHARGE     Medication List      START taking these medications      Instructions   acetaminophen 325 MG Tabs  Commonly known as: Tylenol   Take 1 tablet by mouth every 6 hours as needed.  Dose: 325 mg     benzocaine-menthol 15-3.6 MG Lozg  Commonly known as: CEPACOL   Dissolve 1 Lozenge in the mouth every 2 hours as needed.  Dose: 1 Lozenge     ciprofloxacin 250 MG Tabs  Commonly known as: CIPRO   Take 1 tablet by mouth every 12 hours for 2 days.  Dose: 250 mg     magnesium oxide 400 MG Tabs tablet  Commonly known as: MAG-OX   Take 1 tablet by mouth every day.  Dose: 400 mg        CHANGE how you take these medications      Instructions   oxyCODONE immediate-release 5 MG Tabs  What changed:   · medication strength  · when to take this  · reasons to take this  Commonly known as: ROXICODONE   Take 2 Tablets by mouth every 6 hours as needed for Severe Pain for up to 3 days.  Dose: 10 mg        CONTINUE taking these medications      Instructions   * hydrocortisone 10 MG Tabs  Commonly known as: CORTEF   Take 1 tablet by mouth every evening.  Dose: 10 mg     * hydrocortisone 20 MG Tabs  Commonly known as: CORTEF   Take 1 tablet by mouth every day.  Dose: 20 mg     lacosamide 100 MG Tabs tablet  Commonly known as: VIMPAT   Take 1 tablet by mouth 2 times a day for 30 days.  Dose: 100 mg     metoprolol SR 25 MG Tb24  Commonly known as: TOPROL XL   Take 1 tablet by mouth every morning.  Dose: 25 mg     multivitamin Tabs   Take 1 tablet by mouth every day.  Dose: 1 tablet     tamsulosin 0.4 MG capsule  Commonly known as: FLOMAX   Doctor's comments: PT TO SEE PCP FOR FURTHER REFILLS  TAKE 1 CAPSULE BY MOUTH EVERY DAY     vitamin D3 (cholecalciferol) 400 UNIT Tabs tablet   Take 2 Tablets by mouth every day.  Dose: 800 Units         * This list has 2 medication(s) that are the same as other medications prescribed for you. Read the directions carefully,  and ask your doctor or other care provider to review them with you.            STOP taking these medications    cetirizine 10 MG Tabs  Commonly known as: ZYRTEC     fludrocortisone 0.1 MG Tabs  Commonly known as: FLORINEF     hydrOXYzine HCl 25 MG Tabs  Commonly known as: ATARAX     metFORMIN 500 MG Tabs  Commonly known as: GLUCOPHAGE     senna-docusate 8.6-50 MG Tabs  Commonly known as: PERICOLACE or SENOKOT S     sodium chloride 1 GM Tabs  Commonly known as: SALT            Allergies  No Known Allergies    DIET  Orders Placed This Encounter   Procedures   • Diet Order Diet: Level 7 - Easy to Chew; Liquid level: Level 0 - Thin; Second Modifier: (optional): Consistent CHO (Diabetic); Fluid modifications: (optional): 1200 ml Fluid Restriction     Standing Status:   Standing     Number of Occurrences:   1     Order Specific Question:   Diet:     Answer:   Level 7 - Easy to Chew [22]     Order Specific Question:   Liquid level     Answer:   Level 0 - Thin     Order Specific Question:   Second Modifier: (optional)     Answer:   Consistent CHO (Diabetic) [4]     Order Specific Question:   Fluid modifications: (optional)     Answer:   1200 ml Fluid Restriction [8]       ACTIVITY  As tolerated.  Weight bearing as tolerated    CONSULTATIONS  N/A    PROCEDURES  Imaging  CT-HEAD W/O   Final Result       No acute intracranial abnormality is identified.       Stable left parietal greater than temporal subacute subdural hemorrhage does not cause significant mass effect.       Stable small left subarachnoid hemorrhage.       DX-CHEST-PORTABLE (1 VIEW)   Final Result           1.  Hazy right pulmonary infiltrates, stable since prior.   2.  Small right pleural effusion   3.  Cardiomegaly       CT-HEAD W/O   Final Result           1.  Status post evacuation of left subdural hematoma. Residual subdural collection is similar in thickness to the prior exam with associated sulci effacement, but no midline shift.       2.  Small amount  of subarachnoid hemorrhage is unchanged.             LABORATORY  Lab Results   Component Value Date    SODIUM 129 (L) 04/20/2021    POTASSIUM 3.8 04/20/2021    CHLORIDE 99 04/20/2021    CO2 26 04/20/2021    GLUCOSE 130 (H) 04/20/2021    BUN 18 04/20/2021    CREATININE 0.78 04/20/2021    CREATININE 1.4 04/27/2009        Lab Results   Component Value Date    WBC 6.7 04/20/2021    HEMOGLOBIN 10.1 (L) 04/20/2021    HEMATOCRIT 31.7 (L) 04/20/2021    PLATELETCT 101 (L) 04/20/2021        Total time of the discharge process exceeds 36 minutes.

## 2021-04-21 NOTE — PROGRESS NOTES
[facility] Medicine Daily Progress Note    Date of Service  4/20/2021    Chief Complaint  65 y.o. male admitted 4/14/2021 with failure to thrive    Hospital Course  A 65-year-old man with h/o DM, HTN, Afib (not on AC), s/p PPM, gout, COPD, prosthetic mitral valve, low serum cortisol, alcoholic liver cirrhosis, BPH, seizure disorder, subarachnoid hemorrhage, s/p craniotomy, thrombocytopenia presented with generalized weakness. Patient was recently discharged from hospital after not accepting a referral to a skilled nursing facility. Patient was not able to take care of himself, he now agrees to go to SNF.    4/15: course was unremarkable; SNF referral placed after PT/OT eval  4/16: Had fever 38.1 over night, hemodynamically stable. Na 128 - fluid restriction 1.2L UA, blood cultures ordered.  CXR: hazy right pulmonary infiltrates, stable since prior, small right pleural effusion,      4/17: Reports cough, SOB, weakness, sore throat. Had fever again last evening.   UA positive for microhematuria. Urine culture from 4/16 negative to date.   Blood cultures from 4/16 negative to date.  Empirically on cefepime + vanco  Na 128->131    4/18: Reports headache, cough, chest pain. Had loose stool x3.  Urine culuture from 4/16 positive for Pseudomonas, sensitivity in progress.  Had fever 38.3 overnight, hemodynamically stable.  Na 128; electrolytes supplemented   Afib rate 110-123 on monitor.  Flu negative, COVID 19 from 4/16 negative.  C.diff ordered.  Repeated ECG showed afib, ventricular-paced rhythm, no significant changed comparing prior ECG    4/19: Patient reports cough, with clear mucus, SOB, back pain, headache, nausea. Had fever 38.3 last night. Hemodynamically stable.  C.diff negative.  Repeat- CT head - no acute abnormalities. Stable small left SAH.     Interval Problem Update  4/20: afebrile with stable; still have complains such as headache and cough.  Discussed plan for a rehab discussed.    Labs reviewed   Plt 98 >  101  No leukocytosis  Na 129    Imagings done reviewed     Consultants/Specialty  None    Code Status  Full Code    Disposition  TBD  PT, OT recommended post-acute placement.  SNF    Discussed with patient, patient's nurse and with multidisciplinary team during rounds including , pharmacist and charge nurse.        Review of Systems  Constitutional: Positive for malaise/fatigue. Negative for chills and fever.   HENT: Negative for hearing loss.    Eyes: Negative.    Respiratory: Negative for cough and shortness of breath.    Cardiovascular: Positive for chest pain.   Gastrointestinal: Negative for abdominal pain and nausea.   Genitourinary: Negative for dysuria.   Musculoskeletal: Positive for back pain and myalgias.   Skin: Positive for rash.        Bruises on arms   Neurological: Positive for weakness and headaches.      Physical Exam  Temp:  [36.2 °C (97.2 °F)-37.1 °C (98.7 °F)] 36.8 °C (98.2 °F)  Pulse:  [80-91] 83  Resp:  [16-18] 18  BP: (100-126)/(65-82) 126/76  SpO2:  [90 %-96 %] 91 %    Physical Exam  Vitals and nursing note reviewed.   Constitutional:       Appearance: He is ill-appearing.   HENT:      Head: Normocephalic.      Mouth/Throat:      Mouth: Mucous membranes are moist.      Pharynx: Oropharynx is clear.   Eyes:      Pupils: Pupils are equal, round, and reactive to light.   Cardiovascular:      Rate and Rhythm: Normal rate and regular rhythm.      Heart sounds: Normal heart sounds.   Pulmonary:      Effort: Pulmonary effort is normal. No respiratory distress.      Breath sounds: Normal breath sounds.   Abdominal:      General: Abdomen is flat. Bowel sounds are normal.      Tenderness: There is no abdominal tenderness.   Musculoskeletal:         General: Normal range of motion.      Cervical back: Normal range of motion.   Skin:     General: Skin is warm.      Comments: B/l venous stasis changes on shins   Neurological:      General: No focal deficit present.      Mental Status: He is  alert and oriented to person, place, and time.      Motor: Weakness present.      Fluids    Intake/Output Summary (Last 24 hours) at 4/20/2021 2041  Last data filed at 4/20/2021 1722  Gross per 24 hour   Intake 700 ml   Output 400 ml   Net 300 ml       Laboratory  Recent Labs     04/18/21  0105 04/19/21  0752 04/20/21  0624   WBC 5.3 5.5 6.7   RBC 3.08* 3.28* 3.13*   HEMOGLOBIN 10.0* 10.5* 10.1*   HEMATOCRIT 30.0* 33.4* 31.7*   MCV 97.4 101.8* 101.3*   MCH 32.5 32.0 32.3   MCHC 33.3* 31.4* 31.9*   RDW 60.5* 63.7* 65.3*   PLATELETCT 59* 98* 101*   MPV 12.6 12.1 11.8     Recent Labs     04/18/21  0105 04/19/21  0752 04/20/21  0624   SODIUM 128* 136 129*   POTASSIUM 3.5* 3.8 3.8   CHLORIDE 100 102 99   CO2 20 27 26   GLUCOSE 151* 120* 130*   BUN 16 15 18   CREATININE 0.57 0.74 0.78   CALCIUM 7.8* 8.2* 8.1*                   Imaging  CT-HEAD W/O   Final Result      No acute intracranial abnormality is identified.      Stable left parietal greater than temporal subacute subdural hemorrhage does not cause significant mass effect.      Stable small left subarachnoid hemorrhage.      DX-CHEST-PORTABLE (1 VIEW)   Final Result         1.  Hazy right pulmonary infiltrates, stable since prior.   2.  Small right pleural effusion   3.  Cardiomegaly      CT-HEAD W/O   Final Result         1.  Status post evacuation of left subdural hematoma. Residual subdural collection is similar in thickness to the prior exam with associated sulci effacement, but no midline shift.      2.  Small amount of subarachnoid hemorrhage is unchanged.           Assessment/Plan  * Pneumonia  Assessment & Plan  Reports cough, SOB, weakness, sore throat. Had fever last evening. Afebrile this morning, hemodynamically stable.  CXR showed hazy right pulmonary infiltrates.  Blood cultures from 4/16 negative to date.  Empirically on cefepime + vanco  PCT neg   Abx de-escalated   Oxygen per guidelines    Failure to thrive in adult  Assessment & Plan  Patient was  "initially recommended to go to a skilled nursing facility versus rehab during the past hospitalization.  It appears the patient denied such services and was discharged home.  He was unable to take care of himself at home reason for which he came back to the ED.  The patient mentions that he was not even able to take his medications.  The patient now mentions he would go to a skilled nursing facility if warranted.  We have consulted PT OT we appreciate further recommendations.    Thrombocytopenia (HCC)- (present on admission)  Assessment & Plan  Patient developed thrombocytopenia during the last hospitalization.  Hematology had been consulted  It was determined to stop the patient's allopurinol as well as Keppra which could cause thrombocytopenia.  Hematology recommended reconsulting them if platelets drop below 50.  We will monitor closely  Of note, as per previous discharge summary, during heme workup patient did have positive SPENCER and anti-SSA/RO antibodies, which will require follow up with rheumatology as outpatient, as well as GI follow up for liver disease.    Subdural hematoma (HCC)  Assessment & Plan  Patient was recently admitted and was found to have subdural hematoma.  Neurosurgery had been following.  Repeat CT scan 3/22: 1.2cm extra axial hematoma (from 1cm) with 4.6mm midline shift to right slightly greater compared to prior imaging  S/p craniotomy 3/25  Patient had been discharged and would follow with neurosurgery as an outpatient.  CT head on 4/14/21 reported: \"Status post evacuation of left subdural hematoma. Residual subdural collection is similar in thickness to the prior exam with associated sulci effacement, but no midline shift.\"   On previous hospitalization patient had been hyponatremic and neurosurgery has ordered fludrocortisone and salt tablets however sodium on admission was 136.  Upon discharge last hospitalization patient was found to have cortisol deficiency and was placed on " hydrocortisone therapy.  It is unlikely patient will require fludrocortisone while patient is on hydrocortisone.  We will monitor and make changes accordingly.  PT/OT    Repeat CT head 4/19 with stable small SAH    Low serum cortisol level (HCC)- (present on admission)  Assessment & Plan  Patient was found to have low serum cortisol level during last hospitalization and was placed on hydrocortisone therapy.  Continue home dose.  Will need outpatient endocrinology follow up    History of prosthetic mitral valve- (present on admission)  Assessment & Plan  As per last hospitalization, anticoagulation had been recommended not to continue due to bleeding risk.  As per discharge summary, neurology recommended aspirin in 2 weeks (4/27/2021)    COPD (chronic obstructive pulmonary disease) ([facility])- (present on admission)  Assessment & Plan  Patient currently not on exacerbation or requiring oxygen.  We will monitor closely and make changes accordingly.    Gout- (present on admission)  Assessment & Plan  Patient with a history of gout.  The patient currently not complaining of any pain.  Patient's home dose of allopurinol had been stopped during the past hospitalization due to ongoing thrombocytopenia.    Atrial fibrillation ([facility])- (present on admission)  Assessment & Plan  Continue patient's home dose of metoprolol.  Continue telemetry  As per cardiology during the past hospitalization did not recommend restarting anticoagulation at this time.    Diarrhea  Assessment & Plan  Had loose stool x3  C. Diff PCR sent - not detected     UTI (urinary tract infection)  Assessment & Plan  Urine culuture from 4/16 positive for Pseudomonas, sensitivity reviewed  Cefepime - de-escalate to ciprofloxacin     Anemia  Assessment & Plan  There does not appear to be any active bleeding at this time.  Hemoglobin has remained stable.  We will monitor, repeat CBC and make changes accordingly.    Seizure (HCC)- (present on admission)  Assessment &  Plan  Patient developed seizures due to subdural hematoma and was initially placed on Keppra.  Due to ongoing thrombocytopenia Keppra was discontinued and switched to Vimpat which we will continue at this time.  Seizure precautions.    Alcoholic cirrhosis of liver without ascites (HCC)- (present on admission)  Assessment & Plan  It appears patient was diagnosed with liver cirrhosis during the past hospitalization.  The patient currently not complaining of any abdominal pain  The patient will require outpatient GI follow-up.    Hypertension- (present on admission)  Assessment & Plan  We will continue home dose of metoprolol.  As needed labetalol  Monitor and make changes accordingly.    Pacemaker- (present on admission)  Assessment & Plan  Patient with a history of pacemaker.  This limits the ability to perform MRI.    Obesity (BMI 30-39.9)- (present on admission)  Assessment & Plan  Patient counseled on lifestyle modifications.  He will require close follow-up as an outpatient.    Type 2 diabetes mellitus with complication, without long-term current use of insulin (HCC)- (present on admission)  Assessment & Plan  We will place the patient on insulin sliding scale, hypoglycemia protocol.  We will monitor and make changes accordingly.    Benign prostatic hyperplasia without lower urinary tract symptoms- (present on admission)  Assessment & Plan  Resume home dose of tamsulosin.       VTE prophylaxis: SCD

## 2021-04-21 NOTE — CARE PLAN
Problem: Safety  Goal: Will remain free from falls  Outcome: PROGRESSING AS EXPECTED  Call light and belongings within reach. Bed locked in lowest position. Bed alarm on. Fall precautions in place. Pt calls appropriately.     Problem: Pain Management  Goal: Pain level will decrease to patient's comfort goal  Outcome: PROGRESSING AS EXPECTED  Desired comfort goal discussed with pt. Pt educated on pharm/non pharm measures of managing pain. Pain within pt's comfort goal.

## 2021-04-21 NOTE — CARE PLAN
"  Problem: Safety  Goal: Will remain free from falls  Outcome: PROGRESSING AS EXPECTED  Note: Educate patient on using the call light prior to getting out of bed, pt verbalized \"I will try to remember\". Patient in room close to nurses station. Bed alarm on, bed low and locked. [place]ons[place] socks in use.     Problem: Discharge Barriers/Planning  Goal: Patient's continuum of care needs will be met  Outcome: PROGRESSING AS EXPECTED  Note: Expected d/c tomorrow at 1100 to [facility]. Provide education to patient on d/c.     "

## 2021-04-21 NOTE — PROGRESS NOTES
Monitor summary: SR 70-90, MT .16, QRS .10, QT .34 with rare PAC per strip from monitor room.

## 2021-04-27 NOTE — DOCUMENTATION QUERY
[facility]                                                                       Query Response Note      PATIENT:               AILYN MCINTOSH  ACCT #:                  [id number]  MRN:                     [id number]  :                      1955  ADMIT DATE:       3/12/2021 9:35 AM  DISCH DATE:        2021 9:59 AM  RESPONDING  PROVIDER #:        [id number]           QUERY TEXT:    It is not clear in documentation if this is a traumatic brain injury.  Can the subdural hematoma be further clarified:    NOTE:  If an appropriate response is not listed below, please respond with a new note.      [email or web address]    The patient's Clinical Indicators include:  Patient admitted with Subdural Hematoma.  Consult note 3/29, documented right hemiplegia and ct showed left holohemispheric subdural hematoma -- Brain dysfunction: Traumatic, closed injury (subdural hematoma likely from fall).   Per Consult, 3/31, secondary to anticoagulation and fall? --  Patient was found down by his friend.  D/C summary does not specify whether traumatic or non-traumatic.  Options provided:   -- Subdural hematoma is traumatic brain injury   -- Subdural hematoma is not due to a traumatic brain injury   -- Unable to determine      Query created by: Noni Troy on 2021 5:17 AM    RESPONSE TEXT:    Unable to determine          Electronically signed by:  KEELY MANCIA MD 2021 12:43 AM

## 2021-07-12 PROBLEM — H26.9 CATARACT OF BOTH EYES: Status: ACTIVE | Noted: 2018-04-19

## 2021-07-12 PROBLEM — R92.0 MAMMOGRAPHIC MICROCALCIFICATION FOUND ON DIAGNOSTIC IMAGING OF BREAST: Status: ACTIVE | Noted: 2020-05-14

## 2021-07-12 PROBLEM — H81.10 BENIGN PAROXYSMAL POSITIONAL VERTIGO: Status: ACTIVE | Noted: 2021-01-01

## 2021-07-12 PROBLEM — R60.0 EDEMA OF LOWER EXTREMITY: Status: ACTIVE | Noted: 2019-06-07

## 2021-07-12 PROBLEM — Z92.29 HISTORY OF DRUG THERAPY: Status: ACTIVE | Noted: 2021-01-01

## 2021-07-12 PROBLEM — D22.9 NEVUS, ATYPICAL: Status: ACTIVE | Noted: 2017-10-02

## 2021-07-12 PROBLEM — R80.9 PROTEINURIA: Status: ACTIVE | Noted: 2020-05-19

## 2021-07-12 PROBLEM — R06.2 WHEEZING: Status: ACTIVE | Noted: 2018-05-24

## 2021-07-12 PROBLEM — I82.409 DVT (DEEP VENOUS THROMBOSIS) (HCC): Status: ACTIVE | Noted: 2021-01-01

## 2021-07-12 PROBLEM — L30.8 PRURITIC DERMATITIS: Status: ACTIVE | Noted: 2020-02-28

## 2021-07-12 PROBLEM — Z91.81 HISTORY OF FALL: Status: ACTIVE | Noted: 2021-01-01

## 2021-07-12 PROBLEM — L57.0 ACTINIC KERATOSIS: Status: ACTIVE | Noted: 2018-05-08

## 2021-07-12 PROBLEM — Z95.2 PRESENCE OF PROSTHETIC HEART VALVE: Status: ACTIVE | Noted: 2020-02-28

## 2021-07-12 PROBLEM — K46.9 ABDOMINAL HERNIA: Status: ACTIVE | Noted: 2021-01-01

## 2021-07-12 PROBLEM — R94.31 ELECTROCARDIOGRAM ABNORMAL: Status: ACTIVE | Noted: 2020-02-28

## 2021-07-12 NOTE — PROGRESS NOTES
New Patient    Patient: Morales Olivier  : 1955    Referring Physician:Yelena Haney P.A.-*   Yelena Haney P.A.-C.    CC: seizure       IMPRESSION:    1. Seizure type and frequency of seizures- single reported focal seizure. Limited information   Last  seizure 3/28/2021   2. Etiology/Syndrome-SDH  3. EEG findings- CT No acute intracranial abnormality is identified.Stable left parietal greater than temporal subacute subdural hemorrhage does not cause significant mass effect. Stable small left subarachnoid hemorrhage.  5. Antiepileptic drug side effects and counseling done  Current AED:  mg bid   6. Surgery consideration vns done   7. Safety issues counseling done   8. Reproductive, contraception, pregnancy discussion done, advised compliance    PLAN:    1. Seizure (HCC)  - VIMPAT 100 MG Tab tablet; Take 1 tablet by mouth 2 times a day for 180 days.  Dispense: 60 tablet; Refill: 5    Other orders  - allopurinol (ZYLOPRIM) 100 MG Tab; Take 1 tablet by mouth every day.  - albuterol (PROAIR HFA) 108 (90 Base) MCG/ACT Aero Soln inhalation aerosol; prn     2. Continue  mg bid   3. Advise to reduce triggering factors  4. Seizure precautions were discussed in detail with patient: he does not drive.   I informed him of the State Law of [place] which requires that patients with an episode involving a condition with lapse of consciousness, blackout, seizure, fainting spell, syncope, or other impairment of the level of consciousness cannot drive for at least 3 months from the last episode.   Other precaution includes no working at heights, with heavy machinery or sharp objects, cooking on open fire, swimming without a , or taking a bath in tub.   5. Return in 5 months or sooner if needed.       HISTORY:    I had the opportunity to see Mr.  Morales Olivier in the epilepsy clinic on 2021 for seizure disorder. He came alone.  This is his initial visit. he is referred by  Yelena Haney P.A.-C. for possible seizure disorder.      Dominant hand: left  handed   Outside records and ED record reviewed: Yes.    In brief, his pertinent medical history is remarkable for DM, HTN, Afib (not on AC), s/p PPM, gout, COPD, prosthetic mitral valve, low serum cortisol, alcoholic liver cirrhosis, BPH, seizure disorder, subarachnoid hemorrhage, s/p craniotomy, thrombocytopenia and  generalized weakness.     3/12/2021 he was found to have right sided SDH with a lengthy hospitalization he was eventually needed craniotomy and evacuation by neurosurgery. Per chart review, on 3/28 witnessed seizure descried as left gaze deviation and some rhythmic shoulder involvement. Patient has not recollection. The duration was reportedly 30 seconds. He was placed on Keppra but later switched to Vimpat due to ongoing thrombocytopenia.  It was concluded that the patient thrombocytopenia possibly due to Keppra and allopurinol on top of liver disease, thought to have cirrhosis due to previous heavy alcohol abuse.     No recurrent seizure since discharge. Tolerates  mg bid well.     The seizures were isolated. There was    no cluster of seizures,    no history of status epilepticus    Special features:  They were noted only during the wake, there was no specific timing.     Potential triggering factors were reviewed   Sleep deprivation   Alcohol   Stress   Other    Epilepsy risk factors:     -Positive: right SDH s/p crani  -Negative: Normal prenatal and  course. Normal development physically and intellectually. No febrile convulsions.No alcohol abuse. No significant exposure to recreational drugs. There is no family history of epilepsy, or spells.     Current antiepileptic medicines:    mg bid     Previous workup:    1. EEG data: none     2. Neuroimaging data: CT No acute intracranial abnormality is identified.Stable left parietal greater than temporal subacute subdural hemorrhage does not cause  significant mass effect. Stable small left subarachnoid hemorrhage.      3. Recent AED level:    Prior antiepileptic medications were reviewed  Lacosamide (Vimpat)  and Levetiracetam (Keppra)    Current Outpatient Medications   Medication Sig Dispense Refill   • allopurinol (ZYLOPRIM) 100 MG Tab Take 1 tablet by mouth every day.     • VIMPAT 100 MG Tab tablet Take 1 tablet by mouth 2 times a day for 180 days. 60 tablet 5   • aspirin (ASA) 81 MG Chew Tab chewable tablet Chew 1 tablet every day. 100 tablet    • metoprolol SR (TOPROL XL) 25 MG TABLET SR 24 HR Take 1 tablet by mouth every morning. 30 tablet 0   • tamsulosin (FLOMAX) 0.4 MG capsule TAKE 1 CAPSULE BY MOUTH EVERY DAY 30 Cap 0   • albuterol (PROAIR HFA) 108 (90 Base) MCG/ACT Aero Soln inhalation aerosol prn       No current facility-administered medications for this visit.        No Known Allergies    Past Medical History:   Diagnosis Date   • Atrial fibrillation (HCC)    • Back pain    • Bronchitis    • CAD (coronary artery disease)    • COPD (chronic obstructive pulmonary disease) (HCC)    • Dehydration 10/22/2019   • Gout    • Heart valve disease     mitral valve replacement (bovine)   • Hypertension    • Kidney stone    • Obesity    • Open wound 9/2/2009   • Pacemaker    • Personal history of venous thrombosis and embolism 2009    DVT right leg   • PVC (premature ventricular contraction) 4/13/2019   • Renal disorder     kidney stones   • Type II or unspecified type diabetes mellitus without mention of complication, not stated as uncontrolled     DM type II- diet controlled       Social History     Socioeconomic History   • Marital status: Single     Spouse name: Not on file   • Number of children: Not on file   • Years of education: Not on file   • Highest education level: Not on file   Occupational History   • Not on file   Tobacco Use   • Smoking status: Former Smoker     Packs/day: 2.50     Years: 9.00     Pack years: 22.50     Types: Cigarettes      "Quit date: 1981     Years since quittin.5   • Smokeless tobacco: Never Used   Vaping Use   • Vaping Use: Never used   Substance and Sexual Activity   • Alcohol use: No   • Drug use: Yes     Types: Marijuana   • Sexual activity: Not on file   Other Topics Concern   • Not on file   Social History Narrative   • Not on file     Social Determinants of Health     Financial Resource Strain: Low Risk    • Difficulty of Paying Living Expenses: Not very hard   Food Insecurity: No Food Insecurity   • Worried About Running Out of Food in the Last Year: Never true   • Ran Out of Food in the Last Year: Never true   Transportation Needs: Unmet Transportation Needs   • Lack of Transportation (Medical): Yes   • Lack of Transportation (Non-Medical): Yes   Physical Activity:    • Days of Exercise per Week:    • Minutes of Exercise per Session:    Stress:    • Feeling of Stress :    Social Connections:    • Frequency of Communication with Friends and Family:    • Frequency of Social Gatherings with Friends and Family:    • Attends [employer] Services:    • Active Member of Clubs or Organizations:    • Attends Club or Organization Meetings:    • Marital Status:    Intimate Partner Violence:    • Fear of Current or Ex-Partner:    • Emotionally Abused:    • Physically Abused:    • Sexually Abused:        Family History   Problem Relation Age of Onset   • Diabetes Mother    • Lung Disease Father    • Heart Disease Neg Hx          PHYSICAL EXAM:    /82 (BP Location: Left arm, Patient Position: Sitting, BP Cuff Size: Adult)   Pulse 67   Temp 37.2 °C (99 °F) (Temporal)   Resp 20   Ht 1.778 m (5' 10\")   Wt 109 kg (239 lb 10.2 oz)   SpO2 98%   BMI 34.38 kg/m²     On examination,  He appears his stated age. He has a normal, reactive affect. No apparent distress,  alert and appropriate. No labored breathing.  There is no peripheral edema. Skin: no rash or abnormalities    He gives a precise account of  his clinical symptoms. " He has fluent conversational speech, without error.    Visual fields are full to confrontation.  Pupils are equal, round, and reactive to light.  Extraocular movements are conjugate, full, and without nystagmus His face is symmetric.     There is normal muscle bulk and tone in all four extremities.  No drift.  Normal amplitude rapid alternating movements using the digits of either hand.  I see no tremor.     Cane is required for ambulation         The total visit duration was of 56 minutes of which more than 50% was spent in coordination of care and counseling.         Sohail Torres MD  Diplomate, American Board of Psychiatry and Neurology   Diplomate, American Board of Psychiatry and Neurology in Epilepsy

## 2021-07-12 NOTE — PATIENT INSTRUCTIONS
Seizure precautions    Driving    Every State restricts driving in people with seizures. [place] requires that you be seizure free for 3 months from the date of your last seizure. The Department of Motor Vehicles (DMV), not the doctor, makes the decision on driving. Exceptions may be made for seizures that do not affect mental condition and ability to control a car, or that occur 100% during sleep.. We recommend:  § Do not drive if you are having seizures that would be dangerous on the road.  § Be honest with your doctor about your seizures, even if driving may be at risk  § Be honest with the DMV (use the driving form). It may protect you legally if problems later occur.    Seizure medicines and suicide    Seizure medicines have long been known to help some people with depression, but also to make others worse. The FDA recently took a look at their database of clinical studies of people taking epilepsy medicines. Their finding was 4 suicides in 27,863 patients taking epilepsy medicines, versus none in patients taking placebo (an inactive pill). They reported 105 people of the 27,863 who did not commit suicide, but had thoughts of suicide. Combined, the risk for suicidal thoughts and behavior was about 0.4% (1 in 250) for those taking epilepsy medications and 0.2% (1 in 500) for those given placebos.    This is new and important information because doctors and patients need to know the possible side effects of medicines. But it needs to be put in perspective and certainly is no reason for panic. The 4 suicides in 27,863 is a very small percentage, and it is impossible to be sure the epilepsy drugs were the cause. Depression occurs in about 10% or more of people with epilepsy, even independent of medications. We recommend the following.  § Do not stop your seizure medicine. It could be dangerous.  § If you have symptoms of depression, such as crying and low mood, please discuss them at your clinic visits, so a  decision can be made about whether antidepressant medication or referral to a psychotherapist would be useful.  § If you are thinking seriously about suicide, please call 911.  § For most people, this FDA warning is just something to know, but not a reason to change medicines.    Bone health    Several of the older antiseizure medications may cause thinning of bones with long-term use, leading to broken bones later in life. This is most problematic with phenytoin (Dilantin), but may occur with carbamazepine (Tegretol, Carbatrol), phenobarbital, primidone (Mysoline) and possibly valproate/valproic acid (Depakote, Depakene). This is a larger concern for women in mid-life or older, but it also can be an issue for men and younger women.   § This potential problem is not an emergency and occurs over months to years; do not stop your seizure medication without discussing your concerns with your doctor.   § Calcium, vitamin D3 supplementation and regular exercise may be helpful to maintain bone health.  § Periodic bone density screening may be helpful to show existence or progression of bone thinning.     Water Safety    You could drown during a seizure that occurs in water. Use the [employer] system for swimming. Let the santhosh know that you have seizures. Take showers instead of baths.    Burn safety    If you have uncontrolled seizures, be very careful around heat or flames. Cook on the back burner - you are less likely to lean on the burner or turn over the soup during a seizure. Don’t smoke, which is good advice for other reasons as well. Set the maximum house hot water temperature to 110 degrees Fahrenheit. Put guards on open fireplaces, wood stoves or radiators.     Heights    Occasional use of ladders and going up and down stairs is a reasonable risk. If your seizures are not in control, then do not work on ladders or unprotected heights for more than brief minutes.    Equipment and Power Tools    Cutting, chopping and  drilling equipment should have safety guards to avoid inadvertent injury; otherwise, do not use it if your seizures are not fully controlled. Do not use mowers lacking automatic stop switches or chain saws.     Safety    If you have uncontrolled seizures, do not carry your child in your arms, but use one of the slings/papooses. Change the baby on the floor. Do not bathe the baby in water deep enough for the mouth to be underwater. Breastfeeding is usually considered beneficial, even though small amounts of seizure medicines come out in the breast milk.    Fall Precautions      If you fall with some of your seizures, then fall-proof your environment. Put in carpets, cover sharp corners, and consider wearing a protective helmet in some circumstances.    Sudden Unexplained Death in Epilepsy (SUDEP)    It is rare for people to die from a seizure, but it can happen. One way is trauma or a car crash from a seizure. Another is the poorly understood condition called sudden unexplained death in epilepsy (SUDEP). We think this is most likely due to heart arrhythmias (irregular beats) caused by a seizure, but the mechanism is debated. For people with uncontrolled seizures we recommend:  § Do not suddenly stop your seizure medication, since this can be a risk factor for SUDEP.  § Do not be overly worried about SUDEP. It is tragic when it happens, but it is uncommon and there are currently no preventive measures, other than the best possible seizure control.    Medication Side Effects    To be approved for prescription use, seizure medicines must pass strict safety testing. Nevertheless, they all have side effects, some of which are potentially serious or even lethal. The risks of medications must be balanced against the risks of seizures. A full discussion of possible medicine side effects is not possible here, but we recommend:  § Know the main side effects of your seizure medicines. Your doctor is the best source  for individual information. Web sites such as epilepsyfoundation.org and epilepsy.com have good information.  § The package insert provided with your prescription lists full information on side effects, but most of these will never occur in an individual. Let the package insert inform you, but not scare you. Be aware that some side effects occur from drug interactions among all your medications. Interactions can involve prescription medicines, over-the-counter medicines, herbal remedies and even some foods. Grapefruit juice is an example of a seemingly benign food that can raise levels of carbamazepine or other drugs.  § Generic medications are less expensive, but may not produce the same blood levels as do brand name drugs or even other generics. Insurance plans and pharmacies sometimes switch to generics without patient or doctor approval. Be cautious if you are switching or being switched to generics. It may work out fine (and it often is a lot less expensive), but a blood test to check levels might be useful.    Recreation    If having a seizure during a recreational activity would cause you significant harm, then do not do the activity. Use common sense. Confer with your medical team for individual restrictions. As a general guideline for starting discussion with your medical team, we recommend:  § Low-risk recreation can be done by people with seizures, even if not in control. These include walking, running, bowling, golf, baseball, basketball, soccer, volleyball, swimming with the [employer] system, weight training with machines or spotters, elliptical trainers, treadmills with spotters. Confirm this with your medical care team.  § You should be able to go at least 3 months without a seizure to participate in medium-risk activities, but confirm this interval with your doctor. These include football, hockey, ice skating, bike racing, gymnastics, horseback riding and boating.  § You should be seizure free for more  than a year to perform high-risk activities, although some doctors recommend not engaging in high-risk recreational activities at all with a history of epilepsy. Ask yours if it is safe to engage in high-risk recreation. High-risk activities include hang gliding, motor sports, skiing, competitive skateboarding, mountain or rock climbing and SCUBA diving.

## 2021-08-09 NOTE — PROGRESS NOTES
Chief Complaint   Patient presents with   • Atrial Fibrillation   • Hypertension   • Supraventricular Tachycardia (SVT)       Subjective:   Morales Olivier is a 66 y.o. male who presents today as a new consultation for atrial fibrillation high risk medication use subdural hematoma COPD hypertension hyperlipidemia.  He also has type 2 diabetes.  He was recently hospital for subdural hematoma all having a relatively normal INR.  His INR was reversed.  He has a bioprosthetic valve and only takes warfarin for the reason of atrial fibrillation.  He start an aspirin at discharge.  He suffering a significant amount of medical burnout does not be back in hospital.  He said no chest pain palpitations or shortness of breath.    Past Medical History:   Diagnosis Date   • Atrial fibrillation (HCC)    • Back pain    • Bronchitis    • CAD (coronary artery disease)    • COPD (chronic obstructive pulmonary disease) (HCC)    • Dehydration 10/22/2019   • Gout    • Heart valve disease     mitral valve replacement (bovine)   • Hypertension    • Kidney stone    • Obesity    • Open wound 9/2/2009   • Pacemaker    • Personal history of venous thrombosis and embolism 2009    DVT right leg   • PVC (premature ventricular contraction) 4/13/2019   • Renal disorder     kidney stones   • Type II or unspecified type diabetes mellitus without mention of complication, not stated as uncontrolled     DM type II- diet controlled     Past Surgical History:   Procedure Laterality Date   • CRANIOTOMY Left 3/25/2021    Procedure: CRANIOTOMY - FOR SUBDURAL.;  Surgeon: Naveed Hudson M.D.;  Location: [facility];  Service: Neurosurgery   • MITRAL VALVE REPLACE  3/8/2017    Procedure: MITRAL VALVE REPLACE-REDO;  Surgeon: Cornelia Wright M.D.;  Location: [facility];  Service:    • KD  3/8/2017    Procedure: KD;  Surgeon: Cornelia Wright M.D.;  Location: [facility];  Service:    • IRRIGATION & DEBRIDEMENT GENERAL  7/20/2009     Performed by MICHEL URBINA at SURGERY [facility] ORS   • WIDE EXCISION  2009    Performed by MICHEL URBINA at SURGERY [facility] ORS   • ANGIOGRAM  2009    Performed by MICHEL URBINA at SURGERY [facility] ORS   • CATH REMOVAL  2009    Performed by MICHEL URBINA at SURGERY [facility] ORS   • THROMBECTOMY  2009    Performed by MICHEL URBINA at SURGERY [facility] ORS   • EMBOLECTOMY  2009    Performed by MICHEL URBINA at SURGERY [facility] ORS   • ANGIOGRAM  7/3/2009    Performed by MORGAN WARD at SURGERY [facility] ORS   • MITRAL VALVE REPLACE  3/14/08    Performed by JEANA MARTELL at SURGERY [facility] ORS   • MAZE PROCEDURE  3/14/08    Performed by JEANA MARTELL at SURGERY [facility] ORS   • OTHER CARDIAC SURGERY  2008    mitral valve replacement   • AORTIC VALVE REPLACEMENT     • OTHER ABDOMINAL SURGERY      imbulica repair      Family History   Problem Relation Age of Onset   • Diabetes Mother    • Lung Disease Father    • Heart Disease Neg Hx      Social History     Socioeconomic History   • Marital status: Single     Spouse name: Not on file   • Number of children: Not on file   • Years of education: Not on file   • Highest education level: Not on file   Occupational History   • Not on file   Tobacco Use   • Smoking status: Former Smoker     Packs/day: 2.50     Years: 9.00     Pack years: 22.50     Types: Cigarettes     Quit date: 1981     Years since quittin.6   • Smokeless tobacco: Never Used   Vaping Use   • Vaping Use: Never used   Substance and Sexual Activity   • Alcohol use: No   • Drug use: Yes     Types: Marijuana   • Sexual activity: Not on file   Other Topics Concern   • Not on file   Social History Narrative   • Not on file     Social Determinants of Health     Financial Resource Strain: Low Risk    • Difficulty of Paying Living Expenses: Not very hard   Food Insecurity: No Food Insecurity   • Worried About Running Out of  Food in the Last Year: Never true   • Ran Out of Food in the Last Year: Never true   Transportation Needs: Unmet Transportation Needs   • Lack of Transportation (Medical): Yes   • Lack of Transportation (Non-Medical): Yes   Physical Activity:    • Days of Exercise per Week:    • Minutes of Exercise per Session:    Stress:    • Feeling of Stress :    Social Connections:    • Frequency of Communication with Friends and Family:    • Frequency of Social Gatherings with Friends and Family:    • Attends [employer] Services:    • Active Member of Clubs or Organizations:    • Attends Club or Organization Meetings:    • Marital Status:    Intimate Partner Violence:    • Fear of Current or Ex-Partner:    • Emotionally Abused:    • Physically Abused:    • Sexually Abused:      No Known Allergies  Outpatient Encounter Medications as of 8/9/2021   Medication Sig Dispense Refill   • metoprolol SR (TOPROL XL) 50 MG TABLET SR 24 HR Take 1 tablet by mouth every day. 90 tablet 3   • allopurinol (ZYLOPRIM) 100 MG Tab Take 1 tablet by mouth every day.     • VIMPAT 100 MG Tab tablet Take 1 tablet by mouth 2 times a day for 180 days. 60 tablet 5   • aspirin (ASA) 81 MG Chew Tab chewable tablet Chew 1 tablet every day. 100 tablet    • tamsulosin (FLOMAX) 0.4 MG capsule TAKE 1 CAPSULE BY MOUTH EVERY DAY 30 Cap 0   • [DISCONTINUED] ASPIRIN LOW DOSE 81 MG EC tablet  (Patient not taking: Reported on 8/9/2021)     • [DISCONTINUED] albuterol (PROAIR HFA) 108 (90 Base) MCG/ACT Aero Soln inhalation aerosol prn (Patient not taking: Reported on 8/9/2021)     • [DISCONTINUED] metoprolol SR (TOPROL XL) 25 MG TABLET SR 24 HR Take 1 tablet by mouth every morning. 30 tablet 0     No facility-administered encounter medications on file as of 8/9/2021.     Review of Systems   Constitutional: Negative.  Negative for chills, fever and malaise/fatigue.   HENT: Negative.  Negative for sore throat.    Eyes: Negative.    Respiratory: Negative.  Negative for  "cough, hemoptysis, sputum production, shortness of breath, wheezing and stridor.    Cardiovascular: Negative.  Negative for chest pain, palpitations, orthopnea, claudication, leg swelling and PND.   Gastrointestinal: Negative.    Genitourinary: Negative.    Musculoskeletal: Negative.    Skin: Negative.    Neurological: Negative.  Negative for dizziness, loss of consciousness and weakness.   Endo/Heme/Allergies: Negative.  Does not bruise/bleed easily.   All other systems reviewed and are negative.       Objective:   /70 (BP Location: Left arm, Patient Position: Sitting, BP Cuff Size: Adult)   Pulse 88   Resp 16   Ht 1.778 m (5' 10\")   Wt 110 kg (242 lb 12.8 oz)   SpO2 94%   BMI 34.84 kg/m²     Physical Exam   Constitutional: He appears well-developed. No distress.   HENT:   Head: Normocephalic and atraumatic.   Right Ear: External ear normal.   Left Ear: External ear normal.   Nose: Nose normal.   Mouth/Throat: No oropharyngeal exudate.   Eyes: Pupils are equal, round, and reactive to light. Conjunctivae are normal. Right eye exhibits no discharge. Left eye exhibits no discharge. No scleral icterus.   Neck: No JVD present.   Cardiovascular: Normal rate and regular rhythm. Exam reveals no gallop and no friction rub.   No murmur heard.  Pulmonary/Chest: Effort normal. No stridor. No respiratory distress. He has no wheezes. He has no rales. He exhibits no tenderness.   Abdominal: Soft. He exhibits no distension. There is no guarding.   Musculoskeletal:         General: No tenderness or deformity. Normal range of motion.      Cervical back: Neck supple.   Neurological: He is alert. He has normal reflexes. He displays normal reflexes. No cranial nerve deficit. He exhibits normal muscle tone. Coordination normal.   Skin: Skin is warm and dry. No rash noted. He is not diaphoretic. No erythema. No pallor.   Psychiatric: His behavior is normal. Judgment and thought content normal.   Nursing note and vitals " reviewed.    KD dated 2/6/2021 personally viewed inter myself showing a nonviable static mitral valve with a normal gradient.  Normal LV systolic function.    Echocardiogram: Dated 2/4/2021 personally viewed inter myself showing an EF of 45% otherwise prostatic aortic valve with normal gradients.    Lab Results   Component Value Date/Time    CHOLSTRLTOT 125 03/12/2021 04:55 PM    LDL 77 03/12/2021 04:55 PM    HDL 27 (A) 03/12/2021 04:55 PM    TRIGLYCERIDE 104 03/12/2021 04:55 PM       Lab Results   Component Value Date/Time    SODIUM 139 06/28/2021 12:18 PM    POTASSIUM 4.7 06/28/2021 12:18 PM    CHLORIDE 106 06/28/2021 12:18 PM    CO2 23 06/28/2021 12:18 PM    GLUCOSE 139 (H) 06/28/2021 12:18 PM    BUN 15 06/28/2021 12:18 PM    CREATININE 0.98 06/28/2021 12:18 PM    CREATININE 1.4 04/27/2009 10:08 AM     Lab Results   Component Value Date/Time    ALKPHOSPHAT 81 06/28/2021 12:18 PM    ASTSGOT 14 06/28/2021 12:18 PM    ALTSGPT 8 06/28/2021 12:18 PM    TBILIRUBIN 0.6 06/28/2021 12:18 PM        Assessment:     1. Alcoholic cirrhosis of liver without ascites (HCC)     2. Acute respiratory failure with hypoxia (HCC)     3. Acquired circulating anticoagulants (HCC)     4. Atrial fibrillation, unspecified type (HCC)  metoprolol SR (TOPROL XL) 50 MG TABLET SR 24 HR   5. Benign paroxysmal positional vertigo due to bilateral vestibular disorder  metoprolol SR (TOPROL XL) 50 MG TABLET SR 24 HR   6. Chronic obstructive pulmonary disease, unspecified COPD type (HCC)  metoprolol SR (TOPROL XL) 50 MG TABLET SR 24 HR   7. COPD exacerbation (HCC)  metoprolol SR (TOPROL XL) 50 MG TABLET SR 24 HR   8. Edema of lower extremity     9. Electrocardiogram abnormal     10. Essential hypertension, benign     11. History of drug therapy  metoprolol SR (TOPROL XL) 50 MG TABLET SR 24 HR   12. History of prosthetic mitral valve     13. Hypertension secondary to other renal disorders     14. Hypertriglyceridemia     15. Mixed hyperlipidemia      16. Obstructive sleep apnea     17. Pacemaker     18. Paroxysmal SVT (supraventricular tachycardia) (HCC)     19. PVC (premature ventricular contraction)     20. Presence of prosthetic heart valve     21. Type 2 diabetes mellitus with complication, without long-term current use of insulin (HCC)     22. Type 2 diabetes mellitus without complication, without long-term current use of insulin (HCC)     23. Valvular cardiomyopathy (HCC)         Medical Decision Making:  Today's Assessment / Status / Plan:   66-year-old male with bioprosthetic aortic valve with atrial fibrillation on aspirin.  Going over the risk benefits and alternatives of being back on oral coagulation versus being on aspirin versus a watchman device, he would prefer to hold off as he is having a significant amount of medical burnout and does not want to be back in the hospital.  Explained to him a couple times that he was noted to be in the hospital but this was an outpatient procedure and that he is currently not protected from having a stroke.  I explained that the aspirin was inferior to warfarin in preventing a stroke likely has no role anyway.  He again reiterates that he does not want to be in the hospital does not want any further interventions at this point but may be ready in 3 months.  Otherwise I refilled his metoprolol.  I will see him back in 3 months.

## 2021-09-20 PROBLEM — J12.82 PNEUMONIA DUE TO COVID-19 VIRUS: Status: ACTIVE | Noted: 2021-01-01

## 2021-09-20 PROBLEM — U07.1 PNEUMONIA DUE TO COVID-19 VIRUS: Status: ACTIVE | Noted: 2021-01-01

## 2021-09-20 PROBLEM — J96.00 ACUTE RESPIRATORY FAILURE DUE TO COVID-19 (HCC): Status: ACTIVE | Noted: 2021-01-01

## 2021-09-20 PROBLEM — G93.40 ENCEPHALOPATHY: Status: ACTIVE | Noted: 2021-01-01

## 2021-09-20 PROBLEM — N17.9 AKI (ACUTE KIDNEY INJURY) (HCC): Status: ACTIVE | Noted: 2021-01-01

## 2021-09-20 NOTE — ED TRIAGE NOTES
Chief Complaint   Patient presents with   • Atrial Fibrillation     Pt BIB EMS, report that pt was found down in Apartment against wall. Last time seen on Fri, +Covid. pt comes to ER A-fib RVR, rate about 200's. 83% RA, 96 % 15L NRBB   • ALOC   • Weakness   • Shortness of Breath     ACLS equipment in .

## 2021-09-20 NOTE — ED PROVIDER NOTES
ED Provider Note    Scribed for Davidson Melendez M.D. by Catracho Schuler. 9/20/2021  1:30 PM    Primary care provider: Yelena Haney P.A.-C.  Means of arrival: EMS  History obtained from: Patient/EMS Report  History limited by: Patient's acute medication condition    CHIEF COMPLAINT  Chief Complaint   Patient presents with   • Atrial Fibrillation     Pt BIB EMS, report that pt was found down in Apartment against wall. Last time seen on Fri, +Covid. pt comes to ER A-fib RVR, rate about 200's. 83% RA, 96 % 15L NRBB   • ALOC   • Weakness   • Shortness of Breath       HPI  Morales Olivier is a 66 y.o. male who presents to the Emergency Department via EMS for acute ALOC with hypoxia, afib, weakness. Patient was reportedly brought in by EMS after being found down for an unknown amount of time. Last seen normal was last Friday. He was found to be hypertensive, tachycardic in Afib w/ RVR, and hypoxic. Patient also tested positive for COVID recently. Upon arrival patient is responsive and able to answer questions, however he is unable to recall what exactly happened.     HPI limited secondary to patient acute medical condition.     REVIEW OF SYSTEMS  Pertinent positives include ALOC, hypoxia, tachycardia, weakness.   See HPI for further details.     ROS limited secondary to patient acute medical condition.     PAST MEDICAL HISTORY   has a past medical history of Atrial fibrillation (HCC), Back pain, Bronchitis, CAD (coronary artery disease), COPD (chronic obstructive pulmonary disease) (HCC), Dehydration (10/22/2019), Gout, Heart valve disease, Hypertension, Kidney stone, Obesity, Open wound (9/2/2009), Pacemaker, Personal history of venous thrombosis and embolism (2009), PVC (premature ventricular contraction) (4/13/2019), Renal disorder, and Type II or unspecified type diabetes mellitus without mention of complication, not stated as uncontrolled.    SURGICAL HISTORY   has a past surgical history that includes mitral  "valve replace (3/14/08); maze procedure (3/14/08); angiogram (7/3/2009); angiogram (2009); cath removal (2009); thrombectomy (2009); embolectomy (2009); irrigation & debridement general (2009); wide excision (2009); other cardiac surgery (); other abdominal surgery; mitral valve replace (3/8/2017); lopez (3/8/2017); aortic valve replacement; and craniotomy (Left, 3/25/2021).    SOCIAL HISTORY  Social History     Tobacco Use   • Smoking status: Former Smoker     Packs/day: 2.50     Years: 9.00     Pack years: 22.50     Types: Cigarettes     Quit date: 1981     Years since quittin.7   • Smokeless tobacco: Never Used   Vaping Use   • Vaping Use: Never used   Substance Use Topics   • Alcohol use: No   • Drug use: Yes     Types: Marijuana      Social History     Substance and Sexual Activity   Drug Use Yes   • Types: Marijuana       FAMILY HISTORY  Family History   Problem Relation Age of Onset   • Diabetes Mother    • Lung Disease Father    • Heart Disease Neg Hx        CURRENT MEDICATIONS  Home Medications    **Home medications have not yet been reviewed for this encounter**         ALLERGIES  No Known Allergies    PHYSICAL EXAM  VITAL SIGNS: /94   Pulse (!) 128   Resp (!) 30   Ht 1.778 m (5' 10\")   Wt 109 kg (240 lb)   SpO2 90%   BMI 34.44 kg/m²     Nursing note and vitals reviewed.  Constitutional: Critically ill appearing,   HENT: Head is normocephalic and atraumatic. Very dry mucous membranes. Oropharynx is clear without exudate or erythema.   Eyes: Pupils are equal, round, and reactive to light. Conjunctiva are normal.   Cardiovascular: Tachycardic with irregular rhythm. No murmur heard. Normal radial pulses.  Pulmonary/Chest: Breath sunds normal. No wheezes or rales.   Abdominal: Soft and non-tender. No distention    Musculoskeletal: Extremities exhibit normal range of motion without edema or tenderness.   Neurological: Awake, alert and oriented to person, place, " and time. No focal deficits noted.  Skin: Skin is warm and dry. No rash.   Psychiatric: Appropriate for clinical situation.    DIAGNOSTIC STUDIES / PROCEDURES    EKG Interpretation  Interpreted by me as below    LABS  Results for orders placed or performed during the hospital encounter of 21   LACTIC ACID   Result Value Ref Range    Lactic Acid 7.3 (HH) 0.5 - 2.0 mmol/L   EKG (NOW)   Result Value Ref Range    Report       [facility] Emergency Dept.    Test Date:  2021  Pt Name:    AILYN MCINTOSH                 Department: ER  MRN:        [id number]                      Room:       RD 05  Gender:     Male                         Technician: ANISH  :        1955                   Requested By:EFREM FITZGERALD  Order #:    [id number]                    Reading MD:    Measurements  Intervals                                Axis  Rate:       197                          P:          0  VA:         120                          QRS:        76  QRSD:       80                           T:          -15  QT:         228  QTc:        413    Interpretive Statements  SUPRAVENTRICULAR TACHYCARDIA  MULTIPLE PREMATURE COMPLEXES, VENT & SUPRAVEN  ABERRANT COMPLEX, POSSIBLY SUPRAVENTRICULAR  BORDERLINE LOW VOLTAGE IN FRONTAL LEADS  BORDERLINE T ABNORMALITIES, INFERIOR LEADS  BASELINE WANDER IN LEAD(S) V2,V4  Compared to ECG 2021 10:21:58  Aberrant conduction of supraventricular beat(s) now p resent  T-wave abnormality now present  Atrial fibrillation no longer present  Ventricular-paced complex(es) or rhythm no longer present       All labs reviewed by me.    RADIOLOGY  CT-HEAD W/O   Final Result      1.  Cerebral atrophy.      2.  White matter lucencies most consistent with small vessel ischemic change versus demyelination or gliosis.      3.  Otherwise, Head CT without contrast with no acute findings. No evidence of acute cerebral infarction, hemorrhage or mass lesion.      1. Previous left  frontal craniotomy.      2. Small wedge-shaped area of encephalomalacia in the left posterior occipital lobe, likely from previous infarction.      DX-CHEST-PORTABLE (1 VIEW)    (Results Pending)     The radiologist's interpretation of all radiological studies have been reviewed by me.    COURSE & MEDICAL DECISION MAKING  Nursing notes, VS, PMSFHx reviewed in chart.     Review of past medical records shows the patient was last seen in Cardiology clinic on 8/9/21. He is currently on ASA therapy.      1:30 PM - Patient seen and examined at bedside.  Ordered CT-head w/o, DX-chest, Lactic Acid, Creatine Kinase, CBC w/ diff, CMP, UA, Urine Culture, and Blood culture to evaluate his symptoms. The differential diagnoses include but are not limited to: Sepsis, Covid    2:14 PM dehydration, electrolyte abnormality, rhabdomyolysis - Patient treated with Lopressor 5 mg.     Laboratory studies remarkable for acute kidney injury consistent with dehydration.  I have initiated aggressive volume resuscitation in the emergency department. Patient is very tachycardic in the emergency department. Reflective of atrial fibrillation with rapid ventricular response as well as volume depletion. Initially treated him with volume resuscitation. He remained persistently tachycardic and therefore treated him with metoprolol. Patient has a known diagnosis of COVID-19.  Chest x-ray is consistent with COVID-19 pneumonia.  He has a significant oxygen requirement.  Now being transitioned to high flow O2.  Lactic acid is very elevated at 7.  I have already initiated volume resuscitation.  Patient will be admitted to the intensive care unit.    HYDRATION: Based on the patient's presentation of Acute Kindney Injury, Dehydration, Hypotension, Sepsis and Tachycardia the patient was given IV fluids. IV Hydration was used because oral hydration was not adequate alone. Upon recheck following hydration, the patient was improved but still critically  ill.    DISPOSITION:  Patient will be hospitalized in the ICU in critical condition    CRITICAL CARE  I provided critical care services, which included medication orders, frequent reevaluations of the patient's condition and response to treatment, ordering and reviewing test results, and discussing the case with various consultants.  The critical care time associated with the care of the patient was 35 minutes. Review chart for interventions. This time is exclusive of any other billable procedures.      FINAL IMPRESSION  1. Pneumonia due to COVID-19 virus    2. Acute respiratory failure with hypoxia (HCC)    3. Atrial fibrillation with RVR (HCC)    4. Lactic acidosis    5. Dehydration    6. EBEN (acute kidney injury) ([facility])    7. Hypernatremia    8. Elevated CK       The critical care time associated with the care of the patient was 35 minutes.     ICatracho (Scribe), am scribing for, and in the presence of, Davidson Melendez M.D..    Electronically signed by: Catracho Schuler (Scribe), 9/20/2021    IDavidson M.D. personally performed the services described in this documentation, as scribed by Catracho Schuler in my presence, and it is both accurate and complete.    The note accurately reflects work and decisions made by me.  Davidson Melendez M.D.  9/20/2021  3:30 PM

## 2021-09-21 PROBLEM — Z66 DNR (DO NOT RESUSCITATE): Status: ACTIVE | Noted: 2021-01-01

## 2021-09-21 PROBLEM — E87.0 HYPERNATREMIA: Status: ACTIVE | Noted: 2021-01-01

## 2021-09-21 PROBLEM — R79.89 POSITIVE D DIMER: Status: ACTIVE | Noted: 2021-01-01

## 2021-09-21 PROBLEM — I50.22 CHRONIC SYSTOLIC HEART FAILURE (HCC): Status: ACTIVE | Noted: 2021-01-01

## 2021-09-21 NOTE — ASSESSMENT & PLAN NOTE
Due to dehydration and intravascular volume depletion  Normal renal ultrasound  Monitor renal function and urine output  Avoid nephrotoxins and renal dose medications  Improving with rehydration

## 2021-09-21 NOTE — ASSESSMENT & PLAN NOTE
Suspecting pre renal etiology with element of rhabdo as well, patient profoundly dehydrated  IVF  Renal dose meds, avoid nephrotoxins  Monitor urine output and I&O's  Follow renal function  Rule out post obstruction

## 2021-09-21 NOTE — ASSESSMENT & PLAN NOTE
Bolus with amiodarone and begin amiodarone infusion  Optimize potassium and magnesium  Continue metoprolol tartrate, 25 mg twice daily

## 2021-09-21 NOTE — PROGRESS NOTES
Critical Care Progress Note    Date of admission  9/20/2021    Chief Complaint  66 y.o. male admitted 9/20/2021 with respiratory failure and pneumonia due to SARS-CoV-2.  He has a history of HTN, dyslipidemia, DM 2, AF, prior MVR, PPM placement, alcoholic cirrhosis, seizures, prior SAH, COPD and DVT.    Hospital Course      9/21 -    on nonrebreather mask and high flow nasal cannula.  Cautious rehydration.      Interval Problem Update  Reviewed last 24 hour events:      ST  NRB + HFNC  98.0  +4159 mL since admit  Place cortrak  Free water, 300 mL every 4 hours  IV D5W, 50 mL an hour      Review of Systems  Review of Systems   Unable to perform ROS: Acuity of condition        Vital Signs for last 24 hours   Temp:  [35.9 °C (96.7 °F)-36.7 °C (98 °F)] 35.9 °C (96.7 °F)  Pulse:  [] 109  Resp:  [22-53] 35  BP: ()/() 109/79  SpO2:  [82 %-97 %] 89 %    Hemodynamic parameters for last 24 hours       Respiratory Information for the last 24 hours       Physical Exam   Physical Exam  Constitutional:       Appearance: He is not diaphoretic.   HENT:      Head: Normocephalic.      Nose: Nose normal.      Mouth/Throat:      Pharynx: Oropharynx is clear.   Eyes:      Conjunctiva/sclera: Conjunctivae normal.      Pupils: Pupils are equal, round, and reactive to light.   Cardiovascular:      Pulses: Normal pulses.      Comments: Atrial fibrillation  Pulmonary:      Breath sounds: Rales (Scattered crackles) present. No wheezing.      Comments: He is tachypneic  Abdominal:      General: There is no distension.      Tenderness: There is no abdominal tenderness.   Musculoskeletal:      Cervical back: Normal range of motion and neck supple.      Right lower leg: No edema.      Left lower leg: No edema.   Skin:     General: Skin is warm and dry.   Neurological:      Comments: Sedated         Medications  Current Facility-Administered Medications   Medication Dose Route Frequency Provider Last Rate Last Admin   • Pharmacy  Consult: Enteral tube insertion - review meds/change route/product selection  1 Each Other PHARMACY TO DOSE Morales Soriano M.D.       • insulin regular (HumuLIN R,NovoLIN R) injection  2-9 Units Subcutaneous Q6HRS Morales Soriano M.D.        And   • glucose 4 g chewable tablet 16 g  16 g Oral Q15 MIN PRN Morales Soriano M.D.        And   • dextrose 50% (D50W) injection 50 mL  50 mL Intravenous Q15 MIN PRN Morales Soriano M.D.       • metoprolol tartrate (LOPRESSOR) tablet 25 mg  25 mg Enteral Tube TWICE DAILY Morales Soriano M.D.       • dextrose 5% infusion   Intravenous Continuous Morales Soriano M.D.       • Metoprolol Tartrate (LOPRESSOR) injection 5 mg  5 mg Intravenous Q5 MIN PRN Davidson Melendez M.D.   5 mg at 09/20/21 1505   • ampicillin/sulbactam (UNASYN) 3 g in  mL IVPB  3 g Intravenous Q6HRS Navjot Martin M.D.   Stopped at 09/21/21 0634   • azithromycin (ZITHROMAX) tablet 500 mg  500 mg Oral DAILY Navjot Martin M.D.   500 mg at 09/20/21 1645   • senna-docusate (PERICOLACE or SENOKOT S) 8.6-50 MG per tablet 2 Tablet  2 Tablet Oral BID Navjot Martin M.D.        And   • polyethylene glycol/lytes (MIRALAX) PACKET 1 Packet  1 Packet Oral QDAY PRN Navjot Martin M.D.        And   • magnesium hydroxide (MILK OF MAGNESIA) suspension 30 mL  30 mL Oral QDAY PRN Navjot Martin M.D.        And   • bisacodyl (DULCOLAX) suppository 10 mg  10 mg Rectal QDAY PRN Navjot Martin M.D.       • ondansetron (ZOFRAN) syringe/vial injection 4 mg  4 mg Intravenous Q6HRS PRN Navjot Martin M.D.       • MD Alert...ICU Electrolyte Replacement per Pharmacy   Other PHARMACY TO DOSE Navjot Martin M.D.       • acetaminophen (Tylenol) tablet 650 mg  650 mg Oral Q6HRS PRN Navjot Martin M.D.       • ondansetron (ZOFRAN) syringe/vial injection 4 mg  4 mg Intravenous Q4HRS PRN Navjot Martin M.D.       • dexamethasone (DECADRON) injection 6 mg  6 mg  Intravenous DAILY Navjot Martin M.D.   6 mg at 09/21/21 0603   • aspirin (ASA) chewable tab 81 mg  81 mg Oral DAILY Navjot Martin M.D.       • lacosamide (VIMPAT) tablet 100 mg  100 mg Oral BID Navjot Martin M.D.       • dexmedetomidine (PRECEDEX) 400 mcg/100mL NS premix infusion  0.1-1.5 mcg/kg/hr Intravenous Continuous Morales BEBETO Soriano M.D. 27.3 mL/hr at 09/21/21 0700 1 mcg/kg/hr at 09/21/21 0700   • baricitinib (Olumiant) tablet 2 mg  2 mg Oral DAILY AT 1800 Navjot Martin M.D.       • ipratropium-albuterol (DUONEB) nebulizer solution  3 mL Nebulization Q4H PRN (RT) Navjot Martin M.D.           Fluids    Intake/Output Summary (Last 24 hours) at 9/21/2021 0910  Last data filed at 9/21/2021 0614  Gross per 24 hour   Intake 4859.4 ml   Output 700 ml   Net 4159.4 ml       Laboratory      Recent Labs     09/20/21  1330 09/21/21  0315   CPKTOTAL 576* 333*     Recent Labs     09/20/21  1330 09/20/21  1505 09/20/21  2220 09/21/21  0010 09/21/21  0315   SODIUM 151*  --  151* 152*  --    POTASSIUM 5.6*  --  5.2 5.3  --    CHLORIDE 109  --  116* 116*  --    CO2 19*  --  18* 21  --    *  --  106* 102*  --    CREATININE 2.34*  --  2.26* 2.15*  --    MAGNESIUM  --  2.8*  --   --  2.7*   PHOSPHORUS  --  6.2*  --   --  4.5   CALCIUM 9.1  --  7.8* 7.7*  --      Recent Labs     09/20/21  1330 09/20/21  2220 09/21/21  0010   ALTSGPT 32  --  24   ASTSGOT 122*  --  79*   ALKPHOSPHAT 73  --  61   TBILIRUBIN 2.8*  --  1.8*   GLUCOSE 227* 210* 223*     Recent Labs     09/20/21  1330 09/21/21  0010 09/21/21  0315   WBC 15.0*  --  10.9*   NEUTSPOLYS 90.20*  --  89.70*   LYMPHOCYTES 1.80*  --  5.10*   MONOCYTES 4.40  --  2.80   EOSINOPHILS 0.00  --  0.00   BASOPHILS 0.00  --  0.30   ASTSGOT 122* 79*  --    ALTSGPT 32 24  --    ALKPHOSPHAT 73 61  --    TBILIRUBIN 2.8* 1.8*  --      Recent Labs     09/20/21  1330 09/20/21  2220 09/21/21  0315   RBC 5.81  --  4.86   HEMOGLOBIN 16.6  --  14.0   HEMATOCRIT 53.3*   --  44.8   PLATELETCT 57*  --  28*   PROTHROMBTM  --  30.4*  --    INR  --  3.02*  --        Imaging  X-Ray:  I have personally reviewed the images and compared with prior images. and My impression is: Right pleural effusion with scattered bilateral opacities    Assessment/Plan  * Pneumonia due to severe acute respiratory syndrome coronavirus 2 (SARS-CoV-2)- (present on admission)  Assessment & Plan  Dexamethasone, 6 mg daily for 10 days  Baricitinib, 2 mg daily for 14 days  Cautious rehydration  Trend inflammatory markers  Continue empiric Unasyn and azithromycin for possible bacterial pneumonia for now    Acute respiratory failure with hypoxia (HCC)- (present on admission)  Assessment & Plan  On very high supplemental oxygen  High risk of deterioration    Sepsis (HCC)- (present on admission)  Assessment & Plan  Present on admission  Pulmonary source  Lactic acid improved  Continue antibiotics    Chronic systolic heart failure (HCC)  Assessment & Plan  EF 45%  Cautious hydration    Positive D dimer  Assessment & Plan  Check lower extremity venous Doppler for DVT    Encephalopathy- (present on admission)  Assessment & Plan  Acute septic encephalopathy, acute metabolic encephalopathy  Head CT without acute pathology  Ammonia normal  Limit sedatives and mind altering medications as much as possible    EBEN (acute kidney injury) (HCC)- (present on admission)  Assessment & Plan  Suspect due to dehydration and intravascular volume depletion  Renal ultrasound  Monitor renal function and urine output  Avoid nephrotoxins and renal dose medications    Alcoholic cirrhosis (HCC)- (present on admission)  Assessment & Plan  Monitor liver enzymes and synthetic function  Avoid hepatotoxins    COPD (chronic obstructive pulmonary disease) (HCC)- (present on admission)  Assessment & Plan  No acute exacerbation  RT protocols    Type 2 diabetes mellitus with complication, without long-term current use of insulin (HCC)- (present on  admission)  Assessment & Plan  Glycohemoglobin 8.9  Sliding scale insulin    Atrial fibrillation (HCC)- (present on admission)  Assessment & Plan  Optimize potassium and magnesium    Hypertension- (present on admission)  Assessment & Plan  Change metoprolol succinate to metoprolol tartrate, 25 mg twice daily  Goal SBP less than 160    DNR (do not resuscitate)  Assessment & Plan  DNR/DNI status    Hypernatremia  Assessment & Plan  Free water, 300 mL every 4 hours  IV D5W, 50 mL an hour    Thrombocytopenia (HCC)- (present on admission)  Assessment & Plan  History of cirrhosis  Suspect due to a combination of splenic sequestration as well as sepsis  Stop pharmacologic DVT prophylaxis    History of prosthetic mitral valve- (present on admission)  Assessment & Plan  No chronic anticoagulation due to history of SAH and patient wishes    Pacemaker- (present on admission)  Assessment & Plan          VTE:  Heparin  Ulcer: Not Indicated  Lines: Fragoso Catheter  Ongoing indication addressed    I have performed a physical exam and reviewed and updated ROS and Plan today (9/21/2021). In review of yesterday's note (9/20/2021), there are no changes except as documented above.     I have assessed and reassessed his respiratory status.  He is on very high supplemental oxygen and is at high risk for worsening respiratory system dysfunction.    Discussed patient condition and risk of morbidity and/or mortality with RN, RT, Pharmacy, Charge nurse / hot rounds and QA team     The patient remains critically ill.  Critical care time = 35 minutes in directly providing and coordinating critical care and extensive data review.  No time overlap and excludes procedures.    Morales Soriano MD  Pulmonary and Critical Care Medicine

## 2021-09-21 NOTE — CONSULTS
Critical Care Consultation    Date of consult: 9/20/2021    Referring Physician  Davidson Melendez M.D.    Reason for Consultation  Covid 19 respiratory failure, afib with rvr, encephalopathy    History of Presenting Illness  66 y.o. male with extensive past medical history including hypertension, hyperlipidemia, diabetes mellitus, A. fib not on anticoagulation, prosthetic mitral valve, status post permanent pacemaker placement, alcoholic liver cirrhosis, seizure disorder, prior subarachnoid hemorrhage status post craniotomy in April 2021, COPD, history of DVT who presented 9/20/2021 after being found down on the floor during a welfare check.  History is limited as patient is encephalopathic, information obtained through chart review and discussion with other healthcare providers.  Patient was last seen normal 3 days prior to presentation by people outside of apartment complex.  On the day of admission a welfare check occurred and the patient was found on the ground, he was confused, tachycardic, hypotensive, A. fib with RVR, hypoxic, reportedly tested positive for COVID-19 recently. He was unable to recall events from the prior days and his mentation was waxing and waning. He was treated with IVF and lopressor, was able to be transitioned to HFNC. Labs significant for EBEN, hypernatremia, abnormal LFT's, Lactic acid of 7.3, . Subsequently admitted to ICU.     Due to patient's encephalopathy he was unable to give very clear answers for his code status, he has states to me he just wants to go home. He indicated a couple times he would not want ventilator/lifesupport but due to his confusion I reached out to his sisters and had a long conversation with both Domo and Laurel regarding patient condition. We discussed how, in light of how burned out with medical care the patient was becoming (stated multiple times to his cardiologist on last office visit) and due to his poor baseline health status and unlikelihood  of making a meaningful recovery should he require intubation while infected with covid that making him full code would be going against his wishes and that it would be subjecting him to a prolonged hospital course with invasive interventions and unknown complications to be followed by prolonged rehab and uncertain quality of life.      Code Status  DNAR/DNI    Review of Systems  Review of Systems   Unable to perform ROS: Mental acuity       Past Medical History   has a past medical history of Atrial fibrillation (HCC), Back pain, Bronchitis, CAD (coronary artery disease), COPD (chronic obstructive pulmonary disease) (HCC), Dehydration (10/22/2019), Gout, Heart valve disease, Hypertension, Kidney stone, Obesity, Open wound (9/2/2009), Pacemaker, Personal history of venous thrombosis and embolism (2009), PVC (premature ventricular contraction) (4/13/2019), Renal disorder, and Type II or unspecified type diabetes mellitus without mention of complication, not stated as uncontrolled.    Surgical History   has a past surgical history that includes mitral valve replace (3/14/08); maze procedure (3/14/08); angiogram (7/3/2009); angiogram (7/4/2009); cath removal (7/4/2009); thrombectomy (7/4/2009); embolectomy (7/4/2009); irrigation & debridement general (7/20/2009); wide excision (7/20/2009); other cardiac surgery (2008); other abdominal surgery; mitral valve replace (3/8/2017); lopez (3/8/2017); aortic valve replacement; and craniotomy (Left, 3/25/2021).    Family History  family history includes Diabetes in his mother; Lung Disease in his father.    Social History   reports that he quit smoking about 40 years ago. His smoking use included cigarettes. He has a 22.50 pack-year smoking history. He has never used smokeless tobacco. He reports current drug use. Drug: Marijuana. He reports that he does not drink alcohol.    Medications  Home Medications     Reviewed by Louisa Chandra, Pharmacy Intern (Pharmacy Intern) on  09/20/21 at 1529  Med List Status: Complete   Medication Last Dose Status   allopurinol (ZYLOPRIM) 100 MG Tab Unknown Active   aspirin (ASA) 81 MG Chew Tab chewable tablet Unknown Active   finasteride (PROSCAR) 5 MG Tab Unknown Active   hydrOXYzine HCl (ATARAX) 25 MG Tab Unknown Active   metoprolol SR (TOPROL XL) 50 MG TABLET SR 24 HR Unknown Active   tamsulosin (FLOMAX) 0.4 MG capsule Unknown Active   terbinafine (LAMISIL) 1 % cream Unknown Active   triamcinolone acetonide (KENALOG) 0.025 % Cream Unknown Active   VIMPAT 100 MG Tab tablet Unknown Active              Current Facility-Administered Medications   Medication Dose Route Frequency Provider Last Rate Last Admin   • ADENOSINE 6 MG/2ML IV SOLN            • Metoprolol Tartrate (LOPRESSOR) injection 5 mg  5 mg Intravenous Q5 MIN PRN Davidson Melendez M.D.   5 mg at 09/20/21 1505   • lactated ringers infusion   Intravenous Continuous Navjot Martin M.D. 100 mL/hr at 09/20/21 1644 New Bag at 09/20/21 1644   • ampicillin/sulbactam (UNASYN) 3 g in  mL IVPB  3 g Intravenous Q6HRS Navjot Martin M.D.   Stopped at 09/20/21 1714   • azithromycin (ZITHROMAX) tablet 500 mg  500 mg Oral DAILY Navjot Martin M.D.   500 mg at 09/20/21 1645   • senna-docusate (PERICOLACE or SENOKOT S) 8.6-50 MG per tablet 2 Tablet  2 Tablet Oral BID Navjot Martin M.D.        And   • polyethylene glycol/lytes (MIRALAX) PACKET 1 Packet  1 Packet Oral QDAY PRN Navjot Martin M.D.        And   • magnesium hydroxide (MILK OF MAGNESIA) suspension 30 mL  30 mL Oral QDAY PRN Navjot Martin M.D.        And   • bisacodyl (DULCOLAX) suppository 10 mg  10 mg Rectal QDAY PRN Navjot Martin M.D.       • ondansetron (ZOFRAN) syringe/vial injection 4 mg  4 mg Intravenous Q6HRS PRN Navjot Martin M.D.       • MD Alert...ICU Electrolyte Replacement per Pharmacy   Other PHARMACY TO DOSE Navjot Martin M.D.       • acetaminophen (Tylenol) tablet 650 mg  650 mg Oral Q6HRS PRN  Navjot Martin M.D.       • ondansetron (ZOFRAN) syringe/vial injection 4 mg  4 mg Intravenous Q4HRS PRN Navjot Martin M.D.       • guaiFENesin ER (MUCINEX) tablet 600 mg  600 mg Oral BID PRN Navjot Martin M.D.       • insulin regular (HumuLIN R,NovoLIN R) injection  2-9 Units Subcutaneous 4X/DAY ACHS Navjto Martin M.D.   3 Units at 09/20/21 1702    And   • glucose 4 g chewable tablet 16 g  16 g Oral Q15 MIN PRN Navjot Martin M.D.        And   • dextrose 50% (D50W) injection 50 mL  50 mL Intravenous Q15 MIN PRN Navjot Martin M.D.       • dexamethasone (DECADRON) injection 6 mg  6 mg Intravenous DAILY Navjot Martin M.D.   6 mg at 09/20/21 1644   • [START ON 9/21/2021] heparin injection 5,000 Units  5,000 Units Subcutaneous Q8HRS Navjot Martin M.D.       • [START ON 9/21/2021] aspirin (ASA) chewable tab 81 mg  81 mg Oral DAILY Navjot Martin M.D.       • [START ON 9/21/2021] finasteride (PROSCAR) tablet 5 mg  5 mg Oral DAILY Navjot Martin M.D.       • [START ON 9/21/2021] metoprolol SR (TOPROL XL) tablet 50 mg  50 mg Oral DAILY Navjot Martin M.D.       • [START ON 9/21/2021] tamsulosin (FLOMAX) capsule 0.4 mg  0.4 mg Oral DAILY Navjot Martin M.D.       • lacosamide (VIMPAT) tablet 100 mg  100 mg Oral BID Navjot Martin M.D.       • dexmedetomidine (PRECEDEX) 400 mcg/100mL NS premix infusion  0.1-1 mcg/kg/hr Intravenous Continuous Navjot Martin M.D. 27.3 mL/hr at 09/20/21 2219 1 mcg/kg/hr at 09/20/21 2219   • baricitinib (Olumiant) tablet 2 mg  2 mg Oral DAILY AT 1800 Navjot Martin M.D.       • ipratropium-albuterol (DUONEB) nebulizer solution  3 mL Nebulization Q4H PRN (RT) Navjot Martin M.D.           Allergies  No Known Allergies    Vital Signs last 24 hours  Temp:  [36.6 °C (97.8 °F)] 36.6 °C (97.8 °F)  Pulse:  [] 112  Resp:  [22-53] 45  BP: (109-157)/() 129/79  SpO2:  [82 %-97 %] 89 %    Physical Exam  Physical Exam  Vitals and nursing note  reviewed.   Constitutional:       Comments: Drowsy obese 66-year-old male appears older than stated age, appears chronically ill,   HENT:      Head: Normocephalic and atraumatic.      Nose: Nose normal. No rhinorrhea.      Mouth/Throat:      Comments: Profoundly dry mucous membranes, poor dentition  Eyes:      Extraocular Movements: Extraocular movements intact.      Conjunctiva/sclera: Conjunctivae normal.      Pupils: Pupils are equal, round, and reactive to light.   Cardiovascular:      Rate and Rhythm: Tachycardia present. Rhythm irregular.      Heart sounds: No murmur heard.   No gallop.    Pulmonary:      Comments: Poor inspiratory effort, poor cough, diminished breath sounds throughout, expiratory wheeze in the left, scattered rales  Abdominal:      Palpations: Abdomen is soft.      Tenderness: There is no abdominal tenderness. There is no guarding or rebound.      Comments: Hathaway sign negative   Musculoskeletal:         General: No tenderness. Normal range of motion.      Cervical back: Normal range of motion. No rigidity or tenderness.   Skin:     General: Skin is warm and dry.      Capillary Refill: Capillary refill takes 2 to 3 seconds.      Comments: Areas of erythema along right side of body   Neurological:      Mental Status: He is disoriented.      Comments: Drowsy, only oriented to name, moves all extremities antigravity and spontaneously, face symmetrical, tongue midline, sensation intact         Fluids    Intake/Output Summary (Last 24 hours) at 9/20/2021 2359  Last data filed at 9/20/2021 1700  Gross per 24 hour   Intake 2080 ml   Output --   Net 2080 ml       Laboratory  Recent Results (from the past 48 hour(s))   LACTIC ACID    Collection Time: 09/20/21  1:30 PM   Result Value Ref Range    Lactic Acid 7.3 (HH) 0.5 - 2.0 mmol/L   CBC WITH DIFFERENTIAL    Collection Time: 09/20/21  1:30 PM   Result Value Ref Range    WBC 15.0 (H) 4.8 - 10.8 K/uL    RBC 5.81 4.70 - 6.10 M/uL    Hemoglobin 16.6  14.0 - 18.0 g/dL    Hematocrit 53.3 (H) 42.0 - 52.0 %    MCV 91.7 81.4 - 97.8 fL    MCH 28.6 27.0 - 33.0 pg    MCHC 31.1 (L) 33.7 - 35.3 g/dL    RDW 61.6 (H) 35.9 - 50.0 fL    Platelet Count 57 (L) 164 - 446 K/uL    Neutrophils-Polys 90.20 (H) 44.00 - 72.00 %    Lymphocytes 1.80 (L) 22.00 - 41.00 %    Monocytes 4.40 0.00 - 13.40 %    Eosinophils 0.00 0.00 - 6.90 %    Basophils 0.00 0.00 - 1.80 %    Nucleated RBC 2.00 /100 WBC    Neutrophils (Absolute) 13.53 (H) 1.82 - 7.42 K/uL    Lymphs (Absolute) 0.27 (L) 1.00 - 4.80 K/uL    Monos (Absolute) 0.66 0.00 - 0.85 K/uL    Eos (Absolute) 0.00 0.00 - 0.51 K/uL    Baso (Absolute) 0.00 0.00 - 0.12 K/uL    NRBC (Absolute) 0.30 K/uL    Anisocytosis 1+     Microcytosis 1+    COMP METABOLIC PANEL    Collection Time: 09/20/21  1:30 PM   Result Value Ref Range    Sodium 151 (H) 135 - 145 mmol/L    Potassium 5.6 (H) 3.6 - 5.5 mmol/L    Chloride 109 96 - 112 mmol/L    Co2 19 (L) 20 - 33 mmol/L    Anion Gap 23.0 (H) 7.0 - 16.0    Glucose 227 (H) 65 - 99 mg/dL    Bun 100 (HH) 8 - 22 mg/dL    Creatinine 2.34 (H) 0.50 - 1.40 mg/dL    Calcium 9.1 8.5 - 10.5 mg/dL    AST(SGOT) 122 (H) 12 - 45 U/L    ALT(SGPT) 32 2 - 50 U/L    Alkaline Phosphatase 73 30 - 99 U/L    Total Bilirubin 2.8 (H) 0.1 - 1.5 mg/dL    Albumin 3.4 3.2 - 4.9 g/dL    Total Protein 7.6 6.0 - 8.2 g/dL    Globulin 4.2 (H) 1.9 - 3.5 g/dL    A-G Ratio 0.8 g/dL   CREATINE KINASE    Collection Time: 09/20/21  1:30 PM   Result Value Ref Range    CPK Total 576 (H) 0 - 154 U/L   DIFFERENTIAL MANUAL    Collection Time: 09/20/21  1:30 PM   Result Value Ref Range    Metamyelocytes 1.80 %    Myelocytes 1.80 %    Manual Diff Status PERFORMED    PERIPHERAL SMEAR REVIEW    Collection Time: 09/20/21  1:30 PM   Result Value Ref Range    Peripheral Smear Review see below    PLATELET ESTIMATE    Collection Time: 09/20/21  1:30 PM   Result Value Ref Range    Plt Estimation Decreased    MORPHOLOGY    Collection Time: 09/20/21  1:30 PM    Result Value Ref Range    RBC Morphology Present     Polychromia 1+     Poikilocytosis 2+     Schistocytes 1+     Echinocytes 2+    ESTIMATED GFR    Collection Time: 21  1:30 PM   Result Value Ref Range    GFR If  34 (A) >60 mL/min/1.73 m 2    GFR If Non  28 (A) >60 mL/min/1.73 m 2   EKG (NOW)    Collection Time: 21  1:48 PM   Result Value Ref Range    Report       [facility] Emergency Dept[facility]    Test Date:  2021  Pt Name:    AILYN MCINTOSH                 Department: ER  MRN:        [id number]                      Room:       [facility]  Gender:     Male                         Technician: ANISH  :        1955                   Requested By:EFREM FITZGERALD  Order #:    [id number]                    Reading MD: EFREM FITZGERALD MD    Measurements  Intervals                                Axis  Rate:       197                          P:          0  NJ:         120                          QRS:        76  QRSD:       80                           T:          -15  QT:         228  QTc:        413    Interpretive Statements  ATRIAL FIBRILLATION WITH RAPID VENTRICULAR RESPONSE  MULTIPLE PREMATURE COMPLEXES, VENT & SUPRAVEN  ABERRANT COMPLEX, POSSIBLY SUPRAVENTRICULAR  BORDERLINE LOW VOLTAGE IN FRONTAL LEADS  BORDERLINE T ABNORMALITIES, INFERIOR LEADS  BASELINE WANDER IN LEAD(S) V2,V4  Compared to ECG 2021 10:21:58  Aberrant c onduction of supraventricular beat(s) now present  T-wave abnormality now present  Atrial fibrillation persists  Electronically Signed On 2021 15:01:01 PDT by EFREM FITZGERALD MD     LACTIC ACID    Collection Time: 21  3:05 PM   Result Value Ref Range    Lactic Acid 5.9 (HH) 0.5 - 2.0 mmol/L   Procalcitonin    Collection Time: 21  3:05 PM   Result Value Ref Range    Procalcitonin 0.65 (H) <0.25 ng/mL   CRP Quantitative (Non-Cardiac)    Collection Time: 21  3:05 PM   Result Value Ref Range    Stat  C-Reactive Protein 17.00 (H) 0.00 - 0.75 mg/dL   Troponin    Collection Time: 09/20/21  3:05 PM   Result Value Ref Range    Troponin T 81 (H) 6 - 19 ng/L   MAGNESIUM    Collection Time: 09/20/21  3:05 PM   Result Value Ref Range    Magnesium 2.8 (H) 1.5 - 2.5 mg/dL   PHOSPHORUS    Collection Time: 09/20/21  3:05 PM   Result Value Ref Range    Phosphorus 6.2 (H) 2.5 - 4.5 mg/dL   POCT glucose device results    Collection Time: 09/20/21  5:01 PM   Result Value Ref Range    Glucose - Accu-Ck 206 (H) 65 - 99 mg/dL   URINALYSIS    Collection Time: 09/20/21  5:05 PM    Specimen: Urine   Result Value Ref Range    Color DK Yellow     Character Cloudy (A)     Specific Gravity 1.021 <1.035    Ph 5.5 5.0 - 8.0    Glucose Negative Negative mg/dL    Ketones Trace (A) Negative mg/dL    Protein 300 (A) Negative mg/dL    Bilirubin Moderate (A) Negative    Urobilinogen, Urine 1.0 Negative    Nitrite Negative Negative    Leukocyte Esterase Negative Negative    Occult Blood Moderate (A) Negative    Micro Urine Req Microscopic    URINE MICROSCOPIC (W/UA)    Collection Time: 09/20/21  5:05 PM   Result Value Ref Range    WBC 5-10 (A) /hpf    RBC 0-2 (A) /hpf    Bacteria Negative None /hpf    Epithelial Cells Few /hpf    Hyaline Cast 6-10 (A) /lpf    Granular Casts 11-20 /lpf   Lactic Acid Every four hours after STAT order    Collection Time: 09/20/21  8:15 PM   Result Value Ref Range    Lactic Acid 4.8 (HH) 0.5 - 2.0 mmol/L   Cortisol    Collection Time: 09/20/21  8:15 PM   Result Value Ref Range    Cortisol 49.5 (H) 0.0 - 23.0 ug/dL   CoV-2 and Flu A/B by PCR (Roche/KUBOO)    Collection Time: 09/20/21  8:15 PM   Result Value Ref Range    SARS-CoV-2 Source NP Swab    Basic Metabolic Panel    Collection Time: 09/20/21 10:20 PM   Result Value Ref Range    Sodium 151 (H) 135 - 145 mmol/L    Potassium 5.2 3.6 - 5.5 mmol/L    Chloride 116 (H) 96 - 112 mmol/L    Co2 18 (L) 20 - 33 mmol/L    Glucose 210 (H) 65 - 99 mg/dL    Bun 106 (HH) 8 - 22  mg/dL    Creatinine 2.26 (H) 0.50 - 1.40 mg/dL    Calcium 7.8 (L) 8.5 - 10.5 mg/dL    Anion Gap 17.0 (H) 7.0 - 16.0   Prothrombin Time    Collection Time: 09/20/21 10:20 PM   Result Value Ref Range    PT 30.4 (H) 12.0 - 14.6 sec    INR 3.02 (H) 0.87 - 1.13   D-DIMER    Collection Time: 09/20/21 10:20 PM   Result Value Ref Range    D-Dimer Screen >20.00 (H) 0.00 - 0.50 ug/mL (FEU)   ESTIMATED GFR    Collection Time: 09/20/21 10:20 PM   Result Value Ref Range    GFR If African American 35 (A) >60 mL/min/1.73 m 2    GFR If Non  29 (A) >60 mL/min/1.73 m 2       Imaging  DX-CHEST-PORTABLE (1 VIEW)   Final Result         1. Ill-defined bilateral pulmonary opacities, right greater than left, concerning for multifocal pneumonia.   2. Small right pleural effusion.   3. Cardiomegaly.      CT-HEAD W/O   Final Result      1.  Cerebral atrophy.      2.  White matter lucencies most consistent with small vessel ischemic change versus demyelination or gliosis.      3.  Otherwise, Head CT without contrast with no acute findings. No evidence of acute cerebral infarction, hemorrhage or mass lesion.      1. Previous left frontal craniotomy.      2. Small wedge-shaped area of encephalomalacia in the left posterior occipital lobe, likely from previous infarction.          Assessment/Plan  * Acute respiratory failure due to COVID-19 (HCC)- (present on admission)  Assessment & Plan  Continue with the following therapies while monitoring closely:  Decadron: 6 mg qd x10 days  Baricitinib: 2mg qd for 14 days  Diuresis: Deferring diuresis for now, patient profoundly dehydrated with EBEN.  Maintain strict isolation precautions  Proning protocols  Procal elevated, unasyn/azithro ordered.   DDimer pending    Encephalopathy- (present on admission)  Assessment & Plan  multifactorial covid encephalopathy plus hypoxia plus renal impairment   CT head with chronic changes and old CVA, nothing acute  vitals and neuro checks q4h  Limit  sedatives as able, reorient and maintain sleep/wake cycle    EBEN (acute kidney injury) (HCC)- (present on admission)  Assessment & Plan  Suspecting pre renal etiology with element of rhabdo as well, patient profoundly dehydrated  IVF  Renal dose meds, avoid nephrotoxins  Monitor urine output and I&O's  Follow renal function  Rule out post obstruction    Alcoholic cirrhosis (HCC)- (present on admission)  Assessment & Plan  Diagnosed during admission earlier in 2021, it doesn't appear he's followed up with GI.  Avoid hepatoxins, serial monitor liver and synthetic function   Monitor for hepatic encephalopathy  Check ammonia level  No abdominal pain or tenderness.     History of prosthetic mitral valve- (present on admission)  Assessment & Plan  Not on anticoagulation outpatient other than aspirin due to prior SAH and patient wishes.    Pacemaker- (present on admission)  Assessment & Plan  Unknown indication or when was placed.     Sepsis (HCC)- (present on admission)  Assessment & Plan  This is Sepsis Present on admission  SIRS criteria identified on my evaluation include: Tachycardia, with heart rate greater than 90 BPM, Tachypnea, with respirations greater than 20 per minute and Leukocytosis, with WBC greater than 12,000  Source is Respiratory, Covid 19 with elevated procal and possible bacterial superinfection  Sepsis protocol initiated  Fluid resuscitation ordered per protocol  IV antibiotics as appropriate for source of sepsis  While organ dysfunction may be noted elsewhere in this problem list or in the chart, degree of organ dysfunction does not meet CMS criteria for severe sepsis  I have reassessed patient's volume status, he has received 2L IVF and maintaining MAP >65     Unasyn/azithro.       COPD (chronic obstructive pulmonary disease) (HCC)- (present on admission)  Assessment & Plan  Not in exacerbation.   RT consult.   Duoneb prn.  Continuous pulse ox.   Receiving steroids for Covid.    Type 2 diabetes  mellitus with complication, without long-term current use of insulin (HCC)- (present on admission)  Assessment & Plan  Insulin sliding scale    Atrial fibrillation (HCC)- (present on admission)  Assessment & Plan  Not on anticoagulation as outpatient due to patient wishes and prior bleeding events (SAH in 04/21).  Continue metoprolol PO.  Telemetry monitoring.   Metoprolol push for RVR if HR sustaining > 115    Hypertension- (present on admission)  Assessment & Plan  Home regimen metoprolol 50 mg XR qd    Chronic gout of multiple sites- (present on admission)  Assessment & Plan  Allopurinol initially held on admission due to EBEN      Discussed patient condition and risk of morbidity and/or mortality with Family, RN, RT, Pharmacy and Patient.  Discussed with Dr. Davidson who agrees with ICU admission.  The patient remains critically ill with acute hypoxic respiratory failure secondary to COVID-19 pneumonia as well as A. fib with RVR.  Patient requires significant respiratory support with HFNC on maximal settings and close monitoring of hemodynamics.  Critical care time = 90 minutes in directly providing and coordinating critical care and extensive data review.  No time overlap and excludes procedures.

## 2021-09-21 NOTE — ASSESSMENT & PLAN NOTE
Continue with the following therapies while monitoring closely:  Decadron: 6 mg qd x10 days  Baricitinib: 2mg qd for 14 days  Diuresis: Deferring diuresis for now, patient profoundly dehydrated with EBEN.  Maintain strict isolation precautions  Proning protocols  Procal elevated, unasyn/gm ordered.   DDimer pending

## 2021-09-21 NOTE — DIETARY
"Nutrition Support Assessment:  Day 1 of admit.  Morales Olivier is a 66 y.o. male with admitting DX of sepsis.      Current problem list:  1. Hypernatremia  2. Chronic systolic heart failure  3. Pneumonia d/t acute respiratory syndrome  4. EBEN  5. Encephalopathy  6. Alcoholic cirrhosis   7. COPD  8. T2DM  9. A-fib  10. HTN     Assessment:  Estimated Nutritional Needs based on:   Height: 177.8 cm (5' 10\")  Weight: 98.3 kg (216 lb 11.4 oz)  Weight to Use in Calculations: 98.3 kg (216 lb 11.4 oz) - via bed scale.   Ideal Body Weight: 75.3 kg (166 lb)  Percent Ideal Body Weight: 130.5  Body mass index is 31.09 kg/m²., BMI classification: Obese Class I.     Calculation/Equation: MSJ x 1.1 - 1.2 = 1943 - 2119 kcal.   Total Calories / day: 1940 - 2100 (Calories / k - 21)  Total Grams Protein / day: 113 - 151 (Grams Protein / k.5 - 2.0 IBW)     Evaluation:   1. Pt initially on HHFNC w/ nonrebreather mask, unable to take adequate PO intake.  Consult received for TF recommendations.   2. Cortrak to be placed for enteral access.  3. Pt presented w/ acute ALOC with hypoxia, afib, and weakness.  Was found down for an unknown amount of time.  Additional PMHx reviewed.  4. Current clinical picture and MD progress notes thus far reviewed.  5. No staged wounds or edema noted at this time.  6. Labs: Sodium: 152, Glucose: 223, BUN: 102, Creat.: 2.15, AST: 79, Total bilirubin: 1.8, Albumin: 2.7, Magnesium: 2.7, CPK: 333, Lactic acid: 3.8, HgbA1C = 8.9%.  7. Meds: Zithromax, Baricitinib, Decadron, SSI, Electrolyte replacement.  +Dex.  8. No Propofol infusion during time of assessment.  +HHFNC.    9. LBM: .   10. Novasource Renal is an appropriate formula in the setting of EBEN at this time.  Will monitor renal labs and adjust formula/rate as indicated.      Malnutrition Risk: Unable to be determined at this time.      Recommendations/Plan:  1. Once Cortrak is placed and confirmed, initiate Novasource Renal @ 25 mL/hr and " advance per protocol to goal rate of 40 mL/hr, providing 1920 kcal, 87 gm protein (0.9 gm/kg actual body wt // 1.2 gm/kg IBW), 176 gm CHO, and 691 mL of free water per day.  2. Fluids per MD - note Novasource Renal is a low volume formula providing <1L/day.  3. Continue to monitor wt.  4. RD continues to monitor labs daily and adjust TF regimen as indicated.    RD follows.

## 2021-09-21 NOTE — ASSESSMENT & PLAN NOTE
History of cirrhosis  Suspect due to a combination of splenic sequestration as well as sepsis  Anticoagulation strictly contraindicated

## 2021-09-21 NOTE — ASSESSMENT & PLAN NOTE
Not on anticoagulation as outpatient due to patient wishes and prior bleeding events (SAH in 04/21).  Continue metoprolol PO.  Telemetry monitoring.   Metoprolol push for RVR if HR sustaining > 115

## 2021-09-21 NOTE — PROGRESS NOTES
4 Eyes Skin Assessment Completed by Bernardo RN and Mark RN.    Head WDL  Ears WDL  Nose WDL  Mouth WDL  Neck WDL  Breast/Chest WDL  Shoulder Blades Redness  Spine WDL  (R) Arm/Elbow/Hand WDL  (L) Arm/Elbow/Hand WDL  Abdomen WDL  Groin Redness and Excoriation  Scrotum/Coccyx/Buttocks Redness and Excoriation  (R) Leg Redness  (L) Leg WDL  (R) Heel/Foot/Toe Redness  (L) Heel/Foot/Toe WDL          Devices In Places Pulse Ox and Nasal Cannula      Interventions In Place Gray Ear Foams    Possible Skin Injury Yes    Pictures Uploaded Into Epic Yes  Wound Consult Placed Yes  RN Wound Prevention Protocol Ordered Yes

## 2021-09-21 NOTE — ASSESSMENT & PLAN NOTE
Not in exacerbation.   RT consult.   Duoneb prn.  Continuous pulse ox.   Receiving steroids for Covid.

## 2021-09-21 NOTE — ASSESSMENT & PLAN NOTE
This is Sepsis Present on admission  SIRS criteria identified on my evaluation include: Tachycardia, with heart rate greater than 90 BPM, Tachypnea, with respirations greater than 20 per minute and Leukocytosis, with WBC greater than 12,000  Source is Respiratory, Covid 19 with elevated procal and possible bacterial superinfection  Sepsis protocol initiated  Fluid resuscitation ordered per protocol  IV antibiotics as appropriate for source of sepsis  While organ dysfunction may be noted elsewhere in this problem list or in the chart, degree of organ dysfunction does not meet CMS criteria for severe sepsis  I have reassessed patient's volume status, he has received 2L IVF and maintaining MAP >65    Unasyn/azithro.

## 2021-09-21 NOTE — ASSESSMENT & PLAN NOTE
Acute septic encephalopathy, acute metabolic encephalopathy  Head CT without acute pathology  Ammonia normal  Limit sedatives and mind altering medications as much as possible

## 2021-09-21 NOTE — PROGRESS NOTES
Received pt from ER, pt confused and ripping O2 off. Pt with multiple skin tears see 4 eye skin assessment. Called and spoke both sisters and both unsure of patient wishes. Dr notified. Pt does seem to be clearing with O2.

## 2021-09-21 NOTE — ASSESSMENT & PLAN NOTE
- multifactorial covid encephalopathy plus hypoxia plus renal impairment   - CT head with chronic changes and old CVA, nothing acute  -vitals and neuro checks q4h  -Limit sedatives as able, reorient and maintain sleep/wake cycle

## 2021-09-21 NOTE — ASSESSMENT & PLAN NOTE
Diagnosed during admission earlier in 2021, it doesn't appear he's followed up with GI.  Avoid hepatoxins, serial monitor liver and synthetic function   Monitor for hepatic encephalopathy  Check ammonia level  No abdominal pain or tenderness.

## 2021-09-21 NOTE — ASSESSMENT & PLAN NOTE
Dexamethasone, 6 mg daily for 10 days  Trend inflammatory markers  Continue empiric Unasyn for possible bacterial pneumonia for now

## 2021-09-22 NOTE — PROGRESS NOTES
covid-19 surge in effect, crisis charting implemented.    Dr. Mills imformed about patient in afib. No new orders at this time

## 2021-09-22 NOTE — PROGRESS NOTES
Pulmonary and Critical Care Medicine Progress Note    This gentleman has been doing progressively worse over the course of the day. I called and had the pleasure of speaking to Dory Mcginnis, his sister at [phone]. I updated her on this gentleman's condition. I suggested that we transition to comfort measures only. She understands and feels that this would be best.  I then called and had the pleasure of speaking to Laurel Olivier at [phone].  She is this gentleman's other sister.  I updated her on this gentleman's condition and suggested that we transition to comfort measures only.  She also understands and feels that this is best.    I will transition this gentleman to comfort measures only.    Morales Soriano MD  Pulmonary and Critical Care Medicine

## 2021-09-22 NOTE — PROGRESS NOTES
Comfort care measures implemented at 1607. Medications administered per MAR. Sister Domo and Laurel called by this RN while at bedside. Both sisters spoke to patient before implementing comfort measures. All questions and concerns addressed by this RN at the time. Both sisters accepting and thankful for the phone call. Music turned on, NRB and HiFlo O2 discontinued. Magnet applied to pacemaker. Pt became completely apneic at 1627.

## 2021-09-22 NOTE — ASSESSMENT & PLAN NOTE
Acute distal left lower extremity DVT  Anticoagulation contraindicated due to thrombocytopenia  IVC filter placement

## 2021-09-22 NOTE — WOUND TEAM
[facility]  Inpatient Services  Initial Wound and Skin Care Evaluation    Admission Date: 2021     Last order of IP CONSULT TO WOUND CARE was found on 2021 from Hospital Encounter on 2021     HPI, PMH, SH: Reviewed    Past Surgical History:   Procedure Laterality Date   • CRANIOTOMY Left 3/25/2021    Procedure: CRANIOTOMY - FOR SUBDURAL.;  Surgeon: Naveed Hudson M.D.;  Location: SURGERY [facility];  Service: Neurosurgery   • MITRAL VALVE REPLACE  3/8/2017    Procedure: MITRAL VALVE REPLACE-REDO;  Surgeon: Cornelia Wright M.D.;  Location: SURGERY [facility];  Service:    • KD  3/8/2017    Procedure: KD;  Surgeon: Cornelia Wright M.D.;  Location: SURGERY [facility];  Service:    • IRRIGATION & DEBRIDEMENT GENERAL  2009    Performed by MICHEL URBINA at SURGERY [facility]   • WIDE EXCISION  2009    Performed by MICHEL URBINA at SURGERY [facility]   • ANGIOGRAM  2009    Performed by MICHEL URBINA at SURGERY [facility]   • CATH REMOVAL  2009    Performed by MICHEL URBINA at SURGERY [facility]   • THROMBECTOMY  2009    Performed by MICHEL URBINA at [facility]   • EMBOLECTOMY  2009    Performed by MICHEL URBINA at SURGERY [facility]   • ANGIOGRAM  7/3/2009    Performed by MORGAN WARD at SURGERY [facility]   • MITRAL VALVE REPLACE  3/14/08    Performed by CORNELIA WRIGHT at SURGERY [facility]   • MAZE PROCEDURE  3/14/08    Performed by CORNELIA WRIGHT at SURGERY [facility]   • OTHER CARDIAC SURGERY      mitral valve replacement   • AORTIC VALVE REPLACEMENT     • OTHER ABDOMINAL SURGERY      imbulica repair      Social History     Tobacco Use   • Smoking status: Former Smoker     Packs/day: 2.50     Years: 9.00     Pack years: 22.50     Types: Cigarettes     Quit date: 1981     Years since quittin.7   • Smokeless tobacco: Never Used   Substance Use Topics   •  Alcohol use: No     Chief Complaint   Patient presents with   • Atrial Fibrillation     Pt BIB EMS, report that pt was found down in Apartment against wall. Last time seen on Fri, +Covid. pt comes to ER A-fib RVR, rate about 200's. 83% RA, 96 % 15L NRBB   • ALOC   • Weakness   • Shortness of Breath     Diagnosis: Sepsis (HCC) [A41.9]    Unit where seen by Wound Team: T602/00     WOUND CONSULT/FOLLOW UP RELATED TO:  Multiple areas of pressure      WOUND HISTORY:  66 y.o. male who presents to the Emergency Department via EMS for acute ALOC with hypoxia, afib, weakness. Patient was reportedly brought in by EMS after being found down for an unknown amount of time. Last seen normal was last 09/17/21. He was found to be hypertensive, tachycardic in Afib w/ RVR, and hypoxic. Patient also tested positive for COVID recently. Upon arrival patient is responsive and able to answer questions, however he is unable to recall what exactly happened.     WOUND ASSESSMENT/LDA  Wound 09/20/21 Pressure Injury Shoulder Lateral Right sDTI POA  (Active)   Wound Image   09/21/21 1600   Site Assessment Purple;Red    Periwound Assessment Intact    Margins Attached edges    Closure None    Drainage Amount Small    Drainage Description Serosanguineous    Treatments Cleansed;Offloading;Site care    Wound Cleansing Normal Saline Irrigation    Dressing Cleansing/Solutions Not Applicable    Dressing Options Petrolatum Gauze (yellow);Silicone Adhesive Foam    Dressing Changed New    Dressing Status Clean;Dry;Intact    Dressing Change/Treatment Frequency Every 72 hrs, and As Needed    NEXT Dressing Change/Treatment Date 09/24/21    NEXT Weekly Photo (Inpatient Only) 09/28/21    Pressure Injury Stage DTPI    Wound Length (cm) 2.2 cm    Wound Width (cm) 11 cm    Wound Surface Area (cm^2) 24.2 cm^2    WOUND NURSE ONLY - Time Spent with Patient (mins) 45    Number of days: 1       Wound 09/20/21 Abrasion Foot;Leg Lateral Right abrasion vs pressure POA   (Active)   Wound Image     09/21/21 1600   Site Assessment Red;Pink    Periwound Assessment Intact    Margins Attached edges    Closure None    Drainage Amount None    Treatments Offloading    Wound Cleansing Normal Saline Irrigation    Dressing Cleansing/Solutions Not Applicable    Dressing Options Open to Air    Dressing Change/Treatment Frequency As Needed    NEXT Weekly Photo (Inpatient Only) 09/28/21    Pressure Injury Stage DTPI    Wound Length (cm) 4 cm    Wound Width (cm) 2.5 cm    Wound Surface Area (cm^2) 10 cm^2    Number of days: 1       Wound 09/20/21 Pressure Injury Groin;Pannus Right mid Pannus, R groin sDTI POA  (Active)   Wound Image    09/21/21 1600   Site Assessment Pink;Purple;Red    Periwound Assessment Intact    Margins Attached edges    Closure None    Drainage Amount None    Treatments Cleansed;Offloading;Site care    Wound Cleansing Normal Saline Irrigation    Periwound Protectant Barrier Paste    Dressing Cleansing/Solutions Not Applicable    NEXT Weekly Photo (Inpatient Only) 09/28/21    Pressure Injury Stage DTPI    Wound Length (cm) 0.7 cm    Wound Width (cm) 6 cm    Wound Surface Area (cm^2) 4.2 cm^2    Number of days: 1       Wound 09/20/21 Pressure Injury Buttocks Right sDTI POA  (Active)   Wound Image    09/21/21 1600   Site Assessment Purple;Red    Periwound Assessment Intact    Margins Attached edges    Closure None    Treatments Cleansed;Offloading    Wound Cleansing Normal Saline Irrigation    Dressing Cleansing/Solutions Not Applicable    Dressing Options Mepilex    Dressing Changed New    Dressing Status Clean;Dry;Intact    Dressing Change/Treatment Frequency Every 72 hrs, and As Needed    NEXT Dressing Change/Treatment Date 09/24/21    NEXT Weekly Photo (Inpatient Only) 09/28/21    Pressure Injury Stage DTPI    Wound Length (cm) 3 cm    Wound Width (cm) 3 cm    Wound Surface Area (cm^2) 9 cm^2    Number of days: 1      Vascular:    MARJORIE:   No results found.    Lab  Values:    Lab Results   Component Value Date/Time    WBC 10.9 (H) 09/21/2021 03:15 AM    RBC 4.86 09/21/2021 03:15 AM    HEMOGLOBIN 14.0 09/21/2021 03:15 AM    HEMATOCRIT 44.8 09/21/2021 03:15 AM    CREACTPROT 17.00 (H) 09/20/2021 03:05 PM    SEDRATEWES 5 04/05/2021 02:55 PM    HBA1C 8.9 (H) 09/21/2021 03:15 AM        Culture Results show:  No results found for this or any previous visit (from the past 720 hour(s)).    Pain Level/Medicated:  No complaints of pain        INTERVENTIONS BY WOUND TEAM:  Chart and images reviewed. Discussed with bedside RN. All areas of concern (based on picture review, LDA review and discussion with bedside RN) have been thoroughly assessed. Documentation of areas based on significant findings. This RN in to assess patient. Performed standard wound care which includes appropriate positioning, dressing removal and non-selective debridement. Pictures and measurements obtained weekly if/when required.    Right shoulder   Preparation for Dressing removal: NA, wound NAV   Non-selectively Debrided with:  NS and gauze.  Sharp debridement: NA  Jewels wound: Cleansed with NS and gauze, Prepped with no sting   Primary Dressing: xeroform gauze (mepitel ordered)   Secondary (Outer) Dressing: adhesive foam or mepilex.      Right buttocks   Preparation for Dressing removal: NA, NAV   Non-selectively Debrided with:  NS and gauze.  Sharp debridement: NA  Jewels wound: Cleansed with NS, Prepped with no sting   Primary Dressing: sacral mepilex   Secondary (Outer) Dressing: NA    Interdisciplinary consultation: Patient, Bedside RN, wound RN (Omar)     EVALUATION / RATIONALE FOR TREATMENT:  Most Recent Date:  09/21/21: patient with multiple areas of breakdown related to being found down.  Appear to be evolving to stage 2.        Goals: Steady decrease in wound area and depth weekly.    WOUND TEAM PLAN OF CARE ([X] for frequency of wound follow up,):   Nursing to follow orders written for wound care. Contact  wound team if area fails to progress, deteriorates or with any questions/concerns  Dressing changes by wound team:                   Follow up 3 times weekly:                NPWT change 3 times weekly:     Follow up 1-2 times weekly:      Follow up Bi-Monthly:                   Follow up as needed:   X  Other (explain):     NURSING PLAN OF CARE ORDERS (X):  Dressing changes: See Dressing Care orders: X  Skin care: See Skin Care orders:   RN Prevention Protocol: X  Rectal tube care: See Rectal Tube Care orders:   Other orders:    RSKIN:   CURRENTLY IN PLACE (X), APPLIED THIS VISIT (A), ORDERED (O):   Q shift Riaz:  X  Q shift pressure point assessments:  X    Surface/Positioning   Pressure redistribution mattress            Low Airloss        ICU bed   Bariatric foam      Bariatric GUSTABO     Waffle cushion        Waffle Overlay          Reposition q 2 hours    X  TAPs Turning system     Z Juan Pillow     Offloading/Redistribution   Sacral Mepilex (Silicone dressing)     Heel Mepilex (Silicone dressing)       X  Heel float boots (Prevalon boot)             Float Heels off Bed with Pillows       X    Respiratory   Silicone O2 tubing         Gray Foam Ear protectors     Cannula fixation Device (Tender )          High flow offloading Clip    Elastic head band offloading device      Anchorfast                                                         Trach with Optifoam split foam             Containment/Moisture Prevention     Rectal tube or BMS    Purwick/Condom Cath        Fragoso Catheter  X  Barrier wipes           Barrier paste       Antifungal tx      Interdry        Mobilization       Up to chair        Ambulate      PT/OT      Nutrition       Dietician        Diabetes Education      PO     TF   X  TPN     NPO   # days     Other        Anticipated discharge plans: pending.   LTACH:        SNF/Rehab:                  Home Health Care:           Outpatient Wound Center:            Self/Family Care:        Other:                   Vac Discharge Needs:   Not Applicable Pt not on a wound vac:     X  Regular Vac while inpatient, alternative dressing at DC:        Regular Vac in use and continued at DC:            Reg. Vac w/ Skin Sub/Biologic in use. Will need to be changed 2x wkly:      Veraflo Vac while inpatient, ok to transition to Regular Vac on Discharge:           Veraflo Vac while inpatient, will need to remain on Veraflo Vac upon discharge:

## 2021-09-22 NOTE — PROGRESS NOTES
Critical Care Progress Note    Date of admission  9/20/2021    Chief Complaint  66 y.o. male admitted 9/20/2021 with respiratory failure and pneumonia due to SARS-CoV-2.  He has a history of HTN, dyslipidemia, DM 2, AF, prior MVR, PPM placement, alcoholic cirrhosis, seizures, prior SAH, COPD and DVT.    Hospital Course      9/21 -    on nonrebreather mask and high flow nasal cannula.  Cautious rehydration.  9/22 -    begin insulin drip.  Increase D5W.  IVC filter for DVT.  Bolus with amiodarone and begin amiodarone infusion for AF with RVR.      Interval Problem Update  Reviewed last 24 hour events:      AF  Worsening respiratory status  Sats 70s and 80s on HFNC and NRB  UOP OK  Dex 1  TF 40  -950 mL in the last 24  +3209 mL since admit  97.3  Increase D5W 100 mL/hour  Replete Ca  Acute distal left lower extremity DVT    1300 hours addendum:    Developed AF with RVR with heart rate up to 200.  Strong femoral pulse with this rhythm and rate.  I will bolus with amiodarone and begin amiodarone infusion.  It is necessary to hold off on IVC filter placement until his cardiac status is improved.      Review of Systems  Review of Systems   Unable to perform ROS: Acuity of condition        Vital Signs for last 24 hours   Temp:  [35.8 °C (96.4 °F)-36.3 °C (97.3 °F)] 36.2 °C (97.1 °F)  Pulse:  [] 67  Resp:  [26-52] 48  BP: ()/(30-91) 94/81  SpO2:  [80 %-89 %] 81 %    Hemodynamic parameters for last 24 hours       Respiratory Information for the last 24 hours       Physical Exam   Physical Exam  Constitutional:       Appearance: He is not diaphoretic.   HENT:      Head: Normocephalic.      Nose: Nose normal.      Mouth/Throat:      Pharynx: Oropharynx is clear.   Eyes:      Conjunctiva/sclera: Conjunctivae normal.      Pupils: Pupils are equal, round, and reactive to light.   Cardiovascular:      Pulses: Normal pulses.      Comments: Atrial fibrillation  Pulmonary:      Breath sounds: Rales (Coarse crackles  bilaterally) present. No wheezing.      Comments: He is tachypneic  Abdominal:      General: There is no distension.      Tenderness: There is no abdominal tenderness.      Comments: Tolerating enteral tube feedings   Musculoskeletal:      Cervical back: Normal range of motion and neck supple.      Right lower leg: No edema.      Left lower leg: No edema.   Skin:     General: Skin is warm and dry.   Neurological:      Comments: Sedated         Medications  Current Facility-Administered Medications   Medication Dose Route Frequency Provider Last Rate Last Admin   • insulin regular human (HUMULIN/NOVOLIN R) 62.5 Units in  mL infusion per protocol  0-29 Units/hr Intravenous Continuous Morales Soriano M.D. 32 mL/hr at 09/22/21 1303 8 Units/hr at 09/22/21 1303   • dextrose 50% (D50W) injection 25-50 mL  25-50 mL Intravenous PRN Morales Soriano M.D.       • [START ON 9/23/2021] dexamethasone (DECADRON) tablet 6 mg  6 mg Enteral Tube DAILY Morales Soriano M.D.       • dextrose 5% infusion   Intravenous Continuous Morales Soriano M.D.       • amiodarone (NEXTERONE) 360 mg/200 mL infusion  0.5-1 mg/min Intravenous Continuous Morales Soriano M.D.       • amiodarone (NEXTERONE) IVPB 150 mg  150 mg Intravenous Once Morales Soriano M.D. 600 mL/hr at 09/22/21 1255 150 mg at 09/22/21 1255   • AMIODARONE HCL IN DEXTROSE 150-4.21 MG/100ML-% IV SOLN            • Pharmacy Consult: Enteral tube insertion - review meds/change route/product selection  1 Each Other PHARMACY TO DOSE Morales Soriano M.D.       • metoprolol tartrate (LOPRESSOR) tablet 25 mg  25 mg Enteral Tube TWICE DAILY Morales Soriano M.D.   25 mg at 09/22/21 0600   • dextrose 5% infusion   Intravenous Continuous Morales Soriano M.D. 100 mL/hr at 09/22/21 0806 New Bag at 09/22/21 0806   • famotidine (PEPCID) tablet 20 mg  20 mg Enteral Tube DAILY Morales Soriano M.D.   20 mg at 09/22/21 0600   •  lacosamide (VIMPAT) tablet 100 mg  100 mg Enteral Tube BID Morales Soriano M.D.   100 mg at 09/22/21 0600   • senna-docusate (PERICOLACE or SENOKOT S) 8.6-50 MG per tablet 2 Tablet  2 Tablet Enteral Tube BID Morales Soriano M.D.   2 Tablet at 09/22/21 0600    And   • polyethylene glycol/lytes (MIRALAX) PACKET 1 Packet  1 Packet Enteral Tube QDAY PRN Morales Soriano M.D.        And   • magnesium hydroxide (MILK OF MAGNESIA) suspension 30 mL  30 mL Enteral Tube QDAY PRN Morales Soriano M.D.        And   • bisacodyl (DULCOLAX) suppository 10 mg  10 mg Rectal QDAY PRN Morales Soriano M.D.       • acetaminophen (Tylenol) tablet 650 mg  650 mg Enteral Tube Q6HRS PRN Morales Soriano M.D.       • aspirin (ASA) chewable tab 81 mg  81 mg Enteral Tube DAILY Morales Soriano M.D.   81 mg at 09/22/21 0600   • ampicillin/sulbactam (UNASYN) 3 g in  mL IVPB  3 g Intravenous Q6HRS Navjot Martin M.D.   Stopped at 09/22/21 1224   • MD Alert...ICU Electrolyte Replacement per Pharmacy   Other PHARMACY TO DOSE Navjot Martin M.D.       • ondansetron (ZOFRAN) syringe/vial injection 4 mg  4 mg Intravenous Q4HRS PRN Navjot Martin M.D.       • dexmedetomidine (PRECEDEX) 400 mcg/100mL NS premix infusion  0.1-1.5 mcg/kg/hr Intravenous Continuous Morales Soriano M.D. 24.5 mL/hr at 09/22/21 1301 0.9 mcg/kg/hr at 09/22/21 1301   • ipratropium-albuterol (DUONEB) nebulizer solution  3 mL Nebulization Q4H PRN (RT) Navjot Martin M.D.           Fluids    Intake/Output Summary (Last 24 hours) at 9/22/2021 1304  Last data filed at 9/22/2021 1200  Gross per 24 hour   Intake 4744.34 ml   Output 3060 ml   Net 1684.34 ml       Laboratory      Recent Labs     09/20/21  1330 09/21/21  0315 09/22/21  0533   CPKTOTAL 576* 333* 80     Recent Labs     09/20/21  1505 09/20/21  2220 09/21/21  0010 09/21/21  0315 09/21/21  1544 09/22/21  0533   SODIUM  --    < > 152*  --  152* 157*    POTASSIUM  --    < > 5.3  --  5.1 4.7   CHLORIDE  --    < > 116*  --  119* 119*   CO2  --    < > 21  --  20 20   BUN  --    < > 102*  --  94* 85*   CREATININE  --    < > 2.15*  --  1.83* 1.77*   MAGNESIUM 2.8*  --   --  2.7*  --  2.7*   PHOSPHORUS 6.2*  --   --  4.5  --  3.2   CALCIUM  --    < > 7.7*  --  7.9* 6.5*    < > = values in this interval not displayed.     Recent Labs     09/20/21 1330 09/20/21 2220 09/21/21  0010 09/21/21  1544 09/22/21  0533   ALTSGPT 32  --  24  --  19   ASTSGOT 122*  --  79*  --  29   ALKPHOSPHAT 73  --  61  --  51   TBILIRUBIN 2.8*  --  1.8*  --  0.6   PREALBUMIN  --   --   --   --  5.0*   GLUCOSE 227*   < > 223* 384* 428*    < > = values in this interval not displayed.     Recent Labs     09/20/21 1330 09/21/21 0010 09/21/21 0315 09/22/21  0533   WBC 15.0*  --  10.9* 8.4   NEUTSPOLYS 90.20*  --  89.70* 89.40*   LYMPHOCYTES 1.80*  --  5.10* 4.50*   MONOCYTES 4.40  --  2.80 4.20   EOSINOPHILS 0.00  --  0.00 0.00   BASOPHILS 0.00  --  0.30 0.10   ASTSGOT 122* 79*  --  29   ALTSGPT 32 24  --  19   ALKPHOSPHAT 73 61  --  51   TBILIRUBIN 2.8* 1.8*  --  0.6     Recent Labs     09/20/21 1330 09/20/21 2220 09/21/21 0315 09/22/21  0533   RBC 5.81  --  4.86 3.97*   HEMOGLOBIN 16.6  --  14.0 11.6*   HEMATOCRIT 53.3*  --  44.8 37.1*   PLATELETCT 57*  --  28* 34*   PROTHROMBTM  --  30.4*  --   --    INR  --  3.02*  --   --        Imaging  X-Ray:  I have personally reviewed the images and compared with prior images. and My impression is: Right pleural effusion with scattered bilateral opacities    Assessment/Plan  * Pneumonia due to severe acute respiratory syndrome coronavirus 2 (SARS-CoV-2)- (present on admission)  Assessment & Plan  Dexamethasone, 6 mg daily for 10 days  Trend inflammatory markers  Continue empiric Unasyn for possible bacterial pneumonia for now    Acute respiratory failure with hypoxia (HCC)- (present on admission)  Assessment & Plan  On very high supplemental  oxygen  High risk of deterioration  Respiratory failure is worse    Sepsis (HCC)- (present on admission)  Assessment & Plan  Present on admission  Pulmonary source  Continue antibiotics    Chronic systolic heart failure (HCC)  Assessment & Plan  EF 45%  Cautious hydration    Encephalopathy- (present on admission)  Assessment & Plan  Acute septic encephalopathy, acute metabolic encephalopathy  Head CT without acute pathology  Ammonia normal  Limit sedatives and mind altering medications as much as possible    EBEN (acute kidney injury) (HCC)- (present on admission)  Assessment & Plan  Due to dehydration and intravascular volume depletion  Normal renal ultrasound  Monitor renal function and urine output  Avoid nephrotoxins and renal dose medications  Improving with rehydration    DVT (deep venous thrombosis) (HCC)- (present on admission)  Assessment & Plan  Acute distal left lower extremity DVT  Anticoagulation contraindicated due to thrombocytopenia  IVC filter placement    Alcoholic cirrhosis (HCC)- (present on admission)  Assessment & Plan  Monitor liver enzymes and synthetic function  Avoid hepatotoxins    COPD (chronic obstructive pulmonary disease) (HCC)- (present on admission)  Assessment & Plan  No acute exacerbation  RT protocols    Type 2 diabetes mellitus with complication, without long-term current use of insulin (HCC)- (present on admission)  Assessment & Plan  Glycohemoglobin 8.9  Stop sliding scale insulin  Start insulin drip    Atrial fibrillation with rapid ventricular response (HCC)- (present on admission)  Assessment & Plan  Bolus with amiodarone and begin amiodarone infusion  Optimize potassium and magnesium  Continue metoprolol tartrate, 25 mg twice daily    Hypertension- (present on admission)  Assessment & Plan  Goal SBP less than 160  Continue metoprolol tartrate, 25 mg twice daily    DNR (do not resuscitate)  Assessment & Plan  DNR/DNI status    Hypernatremia  Assessment & Plan  Continue free  water, 300 mL every 4 hours  Increase D5W, 100 mL an hour    Thrombocytopenia (HCC)- (present on admission)  Assessment & Plan  History of cirrhosis  Suspect due to a combination of splenic sequestration as well as sepsis  Anticoagulation strictly contraindicated    History of prosthetic mitral valve- (present on admission)  Assessment & Plan  No chronic anticoagulation due to history of SAH and patient wishes    Pacemaker- (present on admission)  Assessment & Plan          VTE:  Heparin  Ulcer: Not Indicated  Lines: Fragoso Catheter  Ongoing indication addressed    I have performed a physical exam and reviewed and updated ROS and Plan today (9/22/2021). In review of yesterday's note (9/21/2021), there are no changes except as documented above.     I have assessed and reassessed his respiratory status.  He is on very high supplemental oxygen and is at high risk for worsening respiratory system dysfunction.    Discussed patient condition and risk of morbidity and/or mortality with RN, RT, Pharmacy, Charge nurse / hot rounds and QA team     The patient remains critically ill.  Critical care time = 35 minutes in directly providing and coordinating critical care and extensive data review.  No time overlap and excludes procedures.    Morales Soriano MD  Pulmonary and Critical Care Medicine

## 2021-09-22 NOTE — DISCHARGE PLANNING
Care Transition Team Discharge Planning    Anticipated Discharge Disposition: TBD     Action: Per chart review, pt remains in ICU level of care.      Barriers to Discharge: not medically cleared/stable    Plan: LSW will continue to follow, and assist with d/c planning.

## 2021-09-23 NOTE — DISCHARGE SUMMARY
Death Summary    Cause of Death  Pneumonia due to SARS-CoV-2.    Comorbid Conditions at the Time of Death  Principal Problem:    Pneumonia due to severe acute respiratory syndrome coronavirus 2 (SARS-CoV-2) POA: Yes  Active Problems:    Sepsis (HCC) POA: Yes    Acute respiratory failure with hypoxia (HCC) POA: Yes    Hypertension POA: Yes    Atrial fibrillation with rapid ventricular response (HCC) POA: Yes    Type 2 diabetes mellitus with complication, without long-term current use of insulin (HCC) POA: Yes    COPD (chronic obstructive pulmonary disease) (HCC) POA: Yes    Alcoholic cirrhosis (HCC) POA: Yes    DVT (deep venous thrombosis) (HCC) POA: Yes    EBEN (acute kidney injury) (HCC) POA: Yes    Encephalopathy POA: Yes    Chronic systolic heart failure (HCC) POA: Unknown    Chronic gout of multiple sites POA: Yes    Pacemaker POA: Yes    History of prosthetic mitral valve POA: Yes    Thrombocytopenia (HCC) POA: Yes    Hypernatremia POA: Unknown    DNR (do not resuscitate) POA: Unknown  Resolved Problems:    * No resolved hospital problems. *      History of Presenting Illness and Hospital Course  This is a 66 y.o. male admitted 2021 with respiratory failure and pneumonia due to SARS-CoV-2.  He was DNR/DNI.  He was treated with dexamethasone and supplemental oxygen.  He also had acute kidney injury and was gently rehydrated.  Despite these therapies, he deteriorated.  Discussions were held with his sisters.  They decided to transition to comfort care.  He subsequently  peacefully.    Death Date: 21   Death Time: 1658 (pt now asystolic even with ppm, no cardiac activity identified at 1630)         Pronounced By (RN1): Arnold Lee  Pronounced By (RN2): Beata Soriano MD  Pulmonary and Critical Care Medicine

## 2022-02-14 NOTE — PROGRESS NOTES
[facility] Medicine Daily Progress Note    Date of Service  4/7/2021    Chief Complaint  65 y.o. male admitted 3/12/2021 with altered mentation.    Hospital Course  66yo PMHx ETOH, AFIb, anticoagulation with warfarin, CAD, bioprosthetic MVR, gout, HTN, PPM, DVT/PE, DM.  Admitted after EMS called (friend didn't hear from patient in 3 days) and found pt with R side deficits.  In ED CT demonstrating L wholohemispheric SDH.  INR reversed with KCentra.  Initially treated non operatively however surveilance CT morena 3/22 showed the SDH was enlarging so taken to the OR.  Angiogram on 3/15 neg for vascular abnormalities.  On 3/25 taken to OR for L craniotomy and evacuation by Dr Hudson.  On 3/28 witnessed Sz, patient started on keppra then switched to Vimpat d/t thrombocytopenia.  Patient also started on dexamethasone, salt tabs and Florinef.  Patient had ongoing severe thrombocytopenia.  Hematology consulted.  Negative for HIT.  Received platelet transfusions for platelets less than 40.  Patient started on IVIG on 4/4.       Interval Problem Update  -No acute overnight events  -Hematology recommended further lab work-up for thrombocytopenia  -Platelets 42 (stable), going to check plt right after plt transfusion today  - reports having headache (stable) and feet pain (chronic), doesn't think oxycodone 5mg helping, will increase to 10mg    Consultants/Specialty  Neurosurgery  Hematology    Code Status  Full Code    Disposition  SNF once medically cleared.    Review of Systems  Review of Systems   Constitutional: Negative for chills and fever.   HENT: Negative for nosebleeds and sore throat.    Eyes: Negative for blurred vision and double vision.   Respiratory: Negative for cough and shortness of breath.    Cardiovascular: Negative for chest pain, palpitations and leg swelling.   Gastrointestinal: Negative for abdominal pain, diarrhea, nausea and vomiting.   Genitourinary: Negative for dysuria and urgency.   Musculoskeletal:  EARMOLD : Patient here to  remade earmold for the right ear (have been working on this fitting since Nov 2021). He did not have his CI for the left side or his hearing aid for the right side with him. The person who brought Margaret Ford to the appt today called Kyra Daniels at [place] [id number].- she explained that Margaret Khalilouard takes the hearing aid and CI off and hides them. She will have someone look for it tomorrow. Once found, we will contact Kyra Daniels to schedule  appointment. The fit of the earmold is good, but it is upside down. I took a photo and sent it to Estephanie Alejo at [facility] to see if we need a remake due to the tubing going in the wrong direction (or if I can remove it and put different tubing in). If the patient's BTE is not found tomorrow, then Kyra Daniels will let me know and I will order a replacement hearing aid under loss and damage coverage. He will possibly need a new otoclip. I will video proper insertion of his earmold so staff can be instructed and help him daily with hearing aid insertion. Negative for back pain.   Skin: Negative for rash.   Neurological: Positive for weakness and headaches. Negative for dizziness and loss of consciousness.        Physical Exam  Temp:  [36.2 °C (97.2 °F)-36.7 °C (98.1 °F)] 36.5 °C (97.7 °F)  Pulse:  [79-81] 79  Resp:  [16-18] 16  BP: (101-131)/(64-99) 126/97  SpO2:  [93 %-100 %] 99 %    Physical Exam  Vitals reviewed.   Constitutional:       General: He is not in acute distress.     Appearance: Normal appearance. He is well-developed. He is not diaphoretic.   HENT:      Head: Normocephalic and atraumatic.      Comments: L scalp incision noted.  No signs of infection     Nose: Nose normal.      Mouth/Throat:      Mouth: Mucous membranes are moist.   Eyes:      General: No scleral icterus.     Extraocular Movements: Extraocular movements intact.      Conjunctiva/sclera: Conjunctivae normal.      Pupils: Pupils are equal, round, and reactive to light.   Neck:      Vascular: No JVD.   Cardiovascular:      Rate and Rhythm: Normal rate and regular rhythm.      Heart sounds: No murmur. No gallop.    Pulmonary:      Effort: Pulmonary effort is normal. No respiratory distress.      Breath sounds: No stridor. No wheezing or rales.   Abdominal:      General: Bowel sounds are normal. There is no distension.      Palpations: Abdomen is soft.      Tenderness: There is no abdominal tenderness. There is no guarding or rebound.   Musculoskeletal:         General: No tenderness.      Cervical back: Normal range of motion.      Right lower leg: No edema.      Left lower leg: No edema.   Skin:     General: Skin is warm and dry.      Findings: Bruising (scattered on extremities) present. No rash.   Neurological:      General: No focal deficit present.      Mental Status: He is alert and oriented to person, place, and time.   Psychiatric:         Mood and Affect: Mood normal.         Behavior: Behavior normal.         Thought Content: Thought content normal.         Fluids    Intake/Output  Summary (Last 24 hours) at 4/7/2021 1635  Last data filed at 4/7/2021 1000  Gross per 24 hour   Intake 100 ml   Output 1320 ml   Net -1220 ml       Laboratory  Recent Labs     04/05/21  1922 04/06/21  0400 04/07/21  0400   WBC 3.9* 3.8* 3.6*   RBC 3.22* 3.45* 3.35*   HEMOGLOBIN 10.7* 11.4* 11.2*   HEMATOCRIT 31.9* 34.8* 32.4*   MCV 99.1* 100.9* 96.7   MCH 33.2* 33.0 33.4*   MCHC 33.5* 32.8* 34.6   RDW 59.4* 61.2* 58.3*   PLATELETCT 45* 44* 42*   MPV 11.0 12.4 10.7     Recent Labs     04/05/21  0908 04/06/21  0400 04/07/21  0400   SODIUM 130* 133* 130*   POTASSIUM 4.4 4.5 4.3   CHLORIDE 99 100 100   CO2 26 25 25   GLUCOSE 158* 154* 171*   BUN 25* 22 20   CREATININE 0.85 0.79 0.72   CALCIUM 8.5 8.4* 8.3*     Recent Labs     04/05/21  1455   APTT 22.8*   INR 1.23*               Imaging  CT-HEAD W/O   Final Result      No significant interval change from one day prior.      CT-HEAD W/O   Final Result      1.  There is overall stable residual left frontal subdural and subarachnoid hemorrhage.   2.  There is left sided hemispheric edema with 3 mm of left-to-right midline shift, previously 4 mm.   3.  There is no new hemorrhage.   4.  There has been slight interval resorption of pneumocephalus with otherwise stable left craniotomy changes.   5.  Focal hypodensity in the inferior left frontal lobe again seen most consistent with an area of evolving ischemia.   6.  Underlying atrophy and white matter disease again noted.      CT-HEAD W/O   Final Result      1.  Stable sequela of left craniotomy, with stable left-sided subarachnoid and subdural hemorrhage.   2.  Residual 4 mm left-to-right midline shift.   3.  No CT evidence of new hemorrhage or large vascular territory infarct.      CT-HEAD W/O   Final Result      Interval left craniotomy with overall decrease in volume of subdural hematoma with some postsurgical extra-axial hemorrhage deep to the craniotomy flap with associated subarachnoid hemorrhage.      Persistent mass  effect and midline shift from left-to-right of approximately 5 mm.      CT-HEAD W/O   Final Result      Left extra-axial hematoma is again noted. Subacute blood closer to the vertex along the left frontoparietal lobes is mildly increased compared to prior measuring 1.2 compared to 1 cm.      Mass effect is again noted with 4.6 mm midline shift to the right, slightly increased compared to prior.      CT-ABDOMEN-PELVIS WITH   Final Result      1.  Ascites is no longer identified.      2.  Liver surface is mildly nodular consistent with cirrhosis.      3.  Splenomegaly is again noted. Punctate focus of decreased attenuation in the spleen could be due to normal differential enhancement although small hemangioma is also possible.      4.  Ventral abdominal wall hernia containing abdominal fat is again identified.      5.  Consolidation and volume loss is noted posteriorly in the right lower lobe which could indicate pneumonia.      6.  Diverticulosis.         DX-CHEST-PORTABLE (1 VIEW)   Final Result      Small right pleural effusion.      Right basilar opacities may represent atelectasis or scarring.         CT-HEAD W/O   Final Result      1.  Stable size of the extra-axial hemorrhage overlying the left cerebral hemisphere with sulci effacement and approximately 3 mm of left-to-right midline shift.         IR-CAROTID-CEREBRAL BILATERAL   Final Result         1.  Normal cerebral arteriogram.      2.  No evidence of arteriovenous malformation or intracranial aneurysm formation.      3.  No evidence of atherosclerotic disease in the right common femoral artery.      CT-CTA HEAD WITH & W/O-POST PROCESS   Final Result      No evidence of occlusive lesion or aneurysm.      Multiple tortuous small vessels along the anterior-inferior aspect of the left temporal lobe could represent some type of vascular malformation.      No significant interval change in left-sided holohemispheric subdural hematoma extending along the falx and  layering over the tentorium.      CT-CTA NECK WITH & W/O-POST PROCESSING   Final Result      CT angiogram of the neck within normal limits.      CT-HEAD W/O   Final Result         1. Grossly unchanged left hemispheric subdural hematoma, most in the left frontal and temporal region.   2. No new intracranial hemorrhage.                  CT-HEAD W/O   Final Result      1.  Stable large LEFT hemispheric subdural hematoma with associated mass effect and midline shift.   2.  No significant change from prior exam obtained 2 hours earlier.      CT-HEAD W/O   Final Result      Large LEFT holohemispheric subdural hematoma with associated mass effect and 4 mm LEFT to RIGHT midline shift.      These findings were discussed with EFREM FITZGERALD on 3/12/2021 11:40 AM.      DX-CHEST-PORTABLE (1 VIEW)   Final Result      1.  Hypoinflation, improved from prior.   2.  Minimal RIGHT lung base atelectasis or scar.   3.  Minimal RIGHT pleural effusion.           Assessment/Plan  * Thrombocytopenia (HCC)  Assessment & Plan  Unsure etiology  HIT negative  Meds? Keppra vs allopurinol?  Heme consulted: s/lp IVIG and high dose steroids without significant improvement, unlikely ITP. RF +, lupus anticoagulant negative  - checking plt 1 hour after plt transfusion today to assess response         Subdural hematoma, acute (HCC)  Assessment & Plan  Continues to have headache  Neurosurgery following and recommending continuing steroids and Keppra for seizure prophylaxis. On steroid taper  Repeat CT scan 3/22: 1.2cm extra axial hematoma (from 1cm) with 4.6mm midline shift to right slightly greater compared to prior imaging  Neurosurgery following  PT/OT/SLP following, continue therapy    S/p craniotomy 3/25  Cont Neuro checks  BP control  Sz prophylaxis  PT/OT/SLP    3/30: As per neurosurgery, patient able to be discharged to skilled nursing facility when ready.  Staples and sutures out 2 weeks postop, follow-up with [facility] neurosurgery group in 4  weeks with a new head CT as per neurosurgery note, their office will arrange it.  Neurosurgery has also increased salt tablets to 2 g 3 times daily with Florinef.    3/31: Patient seems more lethargic this morning.  Neurosurgery repeated CT scan which did not reveal acute abnormality.  Neurosurgery would like to have an MRI of the brain, patient with pacemaker we will inquire if it is compatible with MRI machine.  Neurosurgery also recommends starting the patient on Decadron 4 mg every 6 hours for now.    4/1: Patient seems to be more awake today.  Unable to do MRI due to pacemaker.  We have switched Keppra to Vimpat due to Keppra possibly causing thrombocytopenia.  I talked to Dr. Aranda from neurology and he recommended to start Vimpat at 100 mg twice daily.    4/2: We have started the tapering of Decadron as per neurosurgery's recommendation.      History of prosthetic mitral valve  Assessment & Plan  With paroxysmal atrial fibrillation  Anticoagulation reversed given subdural hematoma  Resume anticoagulation when bleeding risk felt to be acceptable by neurosurgery and hematology due to ongoing thrombocytopenia.    COPD (chronic obstructive pulmonary disease) (HCC)- (present on admission)  Assessment & Plan  Patient not in acute exacerbation or requiring oxygen  O2/RT protocols    Gout- (present on admission)  Assessment & Plan  Due to ongoing thrombocytopenia and as per hematology's recommendation we will hold allopurinol for now.  Patient currently receiving steroids.    Type 2 diabetes mellitus without complication, without long-term current use of insulin (HCC)- (present on admission)  Assessment & Plan  Patient with hyperglycemia worsened by steroids  A1C 5.9  lantus 15 units  SSI  Well controled on current regimen however will likely need to cut back lantus as decadron is tapered off    3/30: We will stop lantus, and continue with ISS. We will monitor and make changes accordingly. Hypoglycemia protocol in  place.    3/31: As per neurosurgery we have started the patient on Decadron 4 mg every 6 hours, we will monitor glucose closely if needed we will restart Lantus.    4/5: Patient currently on decadron tapering. We will continue ISS and hypoglycemia protocol for now. Monitor and make changes accordingly.        Atrial fibrillation (HCC)  Assessment & Plan  Holding anticoagulation in setting of acute SDH  Rate control with metoprolol  Cont Tele monitoring    3/31: As per neurosurgery patient to resume anticoagulation in 2 weeks, unless he requires new neurosurgical intervention.      Seizure (HCC)  Assessment & Plan  Secondary to SDH  Witnessed event: pt started with aphasia then gaze went upward and fixed with head tremor.  Pt was then unresponsive.  Episode lasting approx 1 min with rapid return to baseline   2 Szs on 3/28 none since  Cont Keppra 1g BID    4/1: Due to ongoing thrombocytopenia as per hematology Keppra can sometimes cause low platelets, so we have switched to Vimpat as per neurology's recommendation.    Cognitive deficits  Assessment & Plan  Evaluated by SLP and found to have cognitive deficits, recommending SNF  Cognitive deficits likely secondary to SDH  Discussed with friend, he is not at previous baseline    3/23 Will likely need SNF placement, order placed  CM to assist  Accepted by HH  Noncompliance with pt/ot  Encourage activity    4/5: Pending placement.    Alcoholic cirrhosis of liver without ascites (HCC)  Assessment & Plan  Patient reports history of heavy alcohol use, has not drank in few years  Will need GI follow up as outpatient  Denies previous diagnosis of liver disease  Cirrhotic appearing liver on imaging without ascites  Hep panel neg  Ammonia 26    4/5: Mild elevation of ALT, in the setting of liver disease. Patient currently not complaining of abdominal pain. If it continues to trend upwards or develops new abdominal pain we will consider re-imaging.    Venous stasis dermatitis of  right lower extremity  Assessment & Plan  Does not appear infected on examination  Continue skin care per nursing protocol    Grade IV hemorrhoids  Assessment & Plan  Patient reports chronic hemorrhoids with occasional mild rectal bleeding  lidocaine gel for pain relief        Hyponatremia  Assessment & Plan  Asymptomatic hyponatremia  Hyponatremia worsening 3/22 now down to 126  3/23 stable at 129, cont current management  Likely SIADH  Free water restriction  Due to subdural hematoma will start salt supplementation    4/5: Continue salt tablets and florinef as per neurosurgery recommendations.    Valvular cardiomyopathy (HCC)- (present on admission)  Assessment & Plan  History of bioprosthetic mitral valve replacement    Hypertension  Assessment & Plan  Patient on metoprolol and lisinopril  PRN medications  Monitor and make changes accordingly    Pacemaker  Assessment & Plan  History of pacemaker limiting ability to obtain MRI    Obesity (BMI 30-39.9)- (present on admission)  Assessment & Plan  Will need weight loss program on discharge  Patient counseled on lifestyle modifications  Will need close follow up as outpatient.    Benign prostatic hyperplasia without lower urinary tract symptoms- (present on admission)  Assessment & Plan  Continue Flomax  No evidence of urinary obstruction       VTE prophylaxis: SCDs    I certify that the patient requires continued medically necessary hospital services for the treatment of SDH and will remain in the hospital until neurosurgery feels patient is safe for discharge,.  Discharge plan is anticipated to be to SNF.

## 2022-03-30 NOTE — ED NOTES
Med rec complete per pt. NKDA. No ABX taken in the last 2 weeks. Pt recently started taking benazepril and metformin   Patient discharged by provider with paperwork in hand and no further questions or concerns. Patient ambulated to exit steadily aaox4/4 gcs 15

## 2024-05-04 NOTE — CARE PLAN
Problem: Safety  Goal: Will remain free from falls  Outcome: PROGRESSING AS EXPECTED  Note: Patient at risk for falls, bed alarm on, walker out of sight, nonskid socks on, call light within reach, personal belongings within reach, toileting offered.        Problem: Communication  Goal: The ability to communicate needs accurately and effectively will improve  Outcome: PROGRESSING SLOWER THAN EXPECTED  Note: Encouraged use of call light, assessed needs, encouraged pt to voice feelings.        Problem: Knowledge Deficit  Goal: Knowledge of the prescribed therapeutic regimen will improve  Outcome: PROGRESSING SLOWER THAN EXPECTED      resilient/elastic

## 2024-08-23 NOTE — THERAPY
Cataract Surgery  Pre-Procedure Instructions        Patient Name:  Charles Carrillo    Surgical Procedure:  Cataract, left eye  Date:  12-18-24  Location:  [facility]  Arrival Time:  The hospital will call you several days before surgery with the time.    Before your surgery:     prescription(s) for prednisolone drops at your pharmacy. You will receive a surgery bag from our office.    You need to take a bath or shower and wash your hair either the night before or morning of surgery. Do not use any cosmetics, face creams, perfume or aftershave the morning of surgery. Do not wear any jewelry the day of surgery.    Bring a complete list of your medications to the hospital/surgery center the day of your surgery.    Bring all of your eye drops and the surgery bag along to the [facility]/surgery center the day of your surgery.    The hospital will call you the day before your surgery to let you know what time you will need to arrive for surgery. These calls are made later in the day. The admit times vary and will be between 5:00 am and noon.    Do not eat or drink anything after midnight the night before or the morning of your surgery.           If you have any further questions, please call us at [phone].                  After your surgery:    Begin eye drops in your left eye as soon as you get home from surgery according to the schedule below. Do not stop any drops until Dr. Powell tells you to.    Medication Breakfast Lunch Dinner Bedtime   Prednisolone X X X X        Keep your scheduled post-operative appointments as scheduled below:  1st Post-op Appt -   2nd Post-op Appt -  3rd Post op Appt -  12-19-24  930 am Dr. Powell  12-26-24  945 am Dr. Powell  1-16-25  340 pm Dr. Hogan     Call [phone] if you have any problems, such as significant pain, increasing redness or sudden vision decline anytime following surgery. You can reach someone at this number 24 hours a day. If for some  "Physical Therapy Evaluation completed.   Bed Mobility:  Supine to Sit: Supervised  Transfers: Sit to Stand: Supervised  Gait: Level Of Assist: Supervised with Front-Wheel Walker       Plan of Care: Will benefit from Physical Therapy 1-2 more sessions prior to DC and Plan to complete next treatment by Thursday 4/18  Discharge Recommendations: Equipment: Will Continue to Assess for Equipment Needs. Post-acute therapy Discharge to home with outpatient or home health for additional skilled therapy services.    Pt is a 63 year old male admitted to the hospital for chest pain and R foot pain due to gout. Pt had just been DC'd from the hospital on 4/15 and readmitted on 4/16. Pt reports that prior to chest pain and gout flare up, he was independent with mobility with SPC and ADL's. At time of initial evaluation, pt performed all mobility tasks at SPV level. Pt ambulated short distance in hallway with FWW, SPV. Pt's gait slow and antalgic, decreased heel strike and toe off. No overt LOB and able to withstand mild perturbations. Pt feels that FWW did not make a significant difference in his walking compared to use of SPC and prefers SPC. Strength WNL's. Plan to see pt 1-2 more sessions prior to DC to assess for safety with stair mobility.     See \"Rehab Therapy-Acute\" Patient Summary Report for complete documentation.     " reason you are not able to, contact the nearest hospital.     Call [phone] Monday through Friday, 8AM-5PM if you have any non-emergency questions.

## 2024-10-11 NOTE — PROGRESS NOTES
Daily Progress Note:     Date of Service: 2/11/2021  Primary Team: [facility]   Attending: Dr. Archie Shah MD  Senior Resident: Dr. Bladimir Ojeda MD  Intern: Dr. Latisha Motley MD  Contact:  [phone]    Chief Complaint: Fall    Subjective:  Patient states that the pain on his infected leg is improving, however pain still persisting. Mostly complains of right elbow pain and left knee pain, on colchicine for gout attack. Complained of abdominal pain earlier in the morning, however later resolved. Has had 2 episodes of loose stools since yesterday, non blody, non mucousy, non watery, non foul smelling- was on scheduled stool softners for constipation.   Wound examination : Bullae, Eschar like with persisting erythema, sloughing of skin. Erythema not extending. Continues to have bilateral pedal edema.   Leukocytosis persisting with improvement in bandemia.   Mild hyponatremia  CPK trending down.   Lactate negative,   INR improving.   Last procal from 02/10 : 0.46 elevated. Pending repeat on 02/12  Blood cultures from 02/10 negative -   Mrsa nares from 02/05 negative     Ct lower extremity not showing any abscesses, but consistent with cellulitis findings.                Review of Systems:    Review of Systems   Constitutional: Negative for chills and fever.   HENT: Negative for nosebleeds.    Eyes: Negative for discharge.   Respiratory: Negative for hemoptysis, sputum production and stridor.    Cardiovascular: Positive for leg swelling. Negative for chest pain and palpitations.   Gastrointestinal: Positive for diarrhea. Negative for blood in stool.   Genitourinary: Negative for dysuria and frequency.   Musculoskeletal: Positive for joint pain.        Right leg pain not out of proportion   Skin: Negative for itching.   Neurological: Negative for dizziness and headaches.   Endo/Heme/Allergies: Does not bruise/bleed easily.   Psychiatric/Behavioral: Negative for depression. The patient is not  Pt completed stress portion of cardiac stress test. Pt is discharged to nuclear department for final scan. Nuclear tech will remove PIV. Discharge instructions given to pt. Pt verbalizes understanding to discharge instructions.    nervous/anxious.    All other systems reviewed and are negative.    Objective Data:   Vitals:   Temp:  [36.2 °C (97.2 °F)-37 °C (98.6 °F)] 37 °C (98.6 °F)  Pulse:  [64-83] 64  Resp:  [16-19] 18  BP: (101-130)/(56-78) 130/56  SpO2:  [90 %-95 %] 94 %   93-95 % on room air    Physical Exam:   Physical Exam  Constitutional:       General: He is not in acute distress.  HENT:      Head: Normocephalic and atraumatic.      Right Ear: External ear normal.      Left Ear: External ear normal.      Nose: Nose normal.      Mouth/Throat:      Mouth: Mucous membranes are moist.   Eyes:      Extraocular Movements: Extraocular movements intact.      Pupils: Pupils are equal, round, and reactive to light.   Cardiovascular:      Rate and Rhythm: Normal rate and regular rhythm.      Pulses: Normal pulses.   Pulmonary:      Effort: Pulmonary effort is normal. No respiratory distress.      Breath sounds: Rales (Minimal) present. No wheezing.      Comments: Coarse.  Diminished.  Abdominal:      General: Abdomen is flat. Bowel sounds are normal. There is no distension.      Tenderness: There is no abdominal tenderness. There is no guarding.   Musculoskeletal:         General: Swelling and tenderness (On left knee and right elbow as well as site of cellulitis. ) present. No signs of injury.      Cervical back: Normal range of motion and neck supple.      Right lower leg: Edema present.      Left lower leg: Edema present.      Comments: Pitting edema to bilateral hips   Skin:     General: Skin is warm and dry.      Capillary Refill: Capillary refill takes less than 2 seconds.      Coloration: Skin is not jaundiced.      Findings: No bruising.      Comments:  Bullae, dark discoloration like with persisting erythema, sloughing of skin on right lower extremity, worsening Erythema not extending.   Neurological:      General: No focal deficit present.      Mental Status: He is alert.      Cranial Nerves: No cranial nerve deficit.      Motor: No  weakness.   Psychiatric:         Mood and Affect: Mood normal.         Behavior: Behavior normal.         Thought Content: Thought content normal.     Labs:  Recent Labs     02/10/21  0242 02/10/21  1248 02/11/21  0408   WBC 16.1* 15.1* 16.5*   RBC 3.17* 3.03* 3.04*   HEMOGLOBIN 10.1* 9.6* 9.6*   HEMATOCRIT 31.1* 29.7* 29.5*   MCV 98.1* 98.0* 97.0   MCH 31.9 31.7 31.6   RDW 63.4* 62.5* 60.9*   PLATELETCT 240 230 295   MPV 10.7 10.4 10.7   NEUTSPOLYS 73.90* 87.00* 82.60*   LYMPHOCYTES 4.30* 2.60* 5.20*   MONOCYTES 4.30 3.50 2.60   EOSINOPHILS 0.90 0.90 1.70   BASOPHILS 0.90 2.60* 0.00   RBCMORPHOLO Present Present Present     Recent Labs     02/09/21  0218 02/09/21  1018 02/10/21  0242 02/11/21  0408   SODIUM 136  --  136 132*   POTASSIUM 4.4  --  4.2 3.7   CHLORIDE 103  --  100 98   CO2 24  --  28 26   GLUCOSE 151*  --  118* 114*   BUN 19  --  20 24*   CPKTOTAL  --  1000* 828* 612*     Recent Labs     02/09/21  0218 02/10/21  0242 02/11/21  0408   ALBUMIN 2.7* 2.8* 2.8*   TBILIRUBIN 0.8 0.8 0.7   ALKPHOSPHAT 66 73 76   TOTPROTEIN 5.7* 6.0 5.8*   ALTSGPT 21 17 22   ASTSGOT 36 35 33   CREATININE 0.97 1.06 1.05       Micro:  Blood cultures positive on 2/4 with GAS  Blood cultures negative on 2/6  Blood cultures from 02/10 negative.     EKG:  None    Imaging:   CT right leg: Shows extensive edema, cellulitis, superficial fasciitis.  No soft tissue gas.  No osteomyelitis.    Meds:  Current Outpatient Medications   Medication Instructions   • acetaminophen (TYLENOL) 1,000 mg, Oral, EVERY 6 HOURS PRN   • allopurinol (ZYLOPRIM) 100 mg, Oral, EVERY MORNING   • benzonatate (TESSALON) 100 mg, Oral, 3 TIMES DAILY PRN   • cetirizine (ZYRTEC) 10 mg, Oral, EVERY MORNING   • colchicine (COLCRYS) 0.6 mg, Oral, 2 TIMES DAILY   • furosemide (LASIX) 20 mg, Oral, DAILY   • hydrOXYzine HCl (ATARAX) 25 mg, Oral, 3 TIMES DAILY PRN   • lisinopril (PRINIVIL) 2.5 mg, Oral, EVERY EVENING   • metFORMIN (GLUCOPHAGE) 500 mg, Oral, 2 TIMES DAILY  WITH MEALS   • metoprolol SR (TOPROL XL) 50 mg, Oral, EVERY MORNING   • tamsulosin (FLOMAX) 0.4 MG capsule TAKE 1 CAPSULE BY MOUTH EVERY DAY   • warfarin (COUMADIN) 2 mg, Oral, SEE ADMIN INSTRUCTIONS, Takes 2 mg every Friday has 5 mg tablets to take all other days   • warfarin (COUMADIN) 5 mg, Oral, SEE ADMIN INSTRUCTIONS, Takes Monday, Tuesday, Wednesday, Thursday, Saturday, and Sunday, has 2 mg tablets to take on Fridays      Scheduled Medications   Medication Dose Frequency   • colchicine  0.6 mg BID   • cefTRIAXone (ROCEPHIN) IV  2 g Q24HRS   • metroNIDAZOLE  500 mg Q8HRS   • lisinopril  2.5 mg Q DAY   • furosemide  40 mg BID DIURETIC   • allopurinol  100 mg QAM   • insulin lispro  0-10 Units TID AC   • metoprolol SR  50 mg Q DAY   • acetaminophen  650 mg TID   • zinc sulfate  220 mg DAILY   • multivitamin  1 tablet DAILY   • MD Alert...Warfarin per Pharmacy   PHARMACY TO DOSE   • [START ON 2/20/2021] amiodarone  200 mg DAILY   • amiodarone  400 mg TWICE DAILY      Consultants/Specialty:  Wound  PT  OT  Cardiology  Infectious disease    Problem Representation:   Morales Olivier is a 65-year-old male, admitted on Feb 4th for 3/4 SIRS criteria, concern for severe sepsis secondary to RLE cellulitis.  Admitted for severe sepsis versus septic shock.  Patient intubated in the ER after failing BiPAP.  Despite history of sick sinus syndrome status post pacemaker, patient found to be in SVT which later progressed to A. fib with RVR, on anticoagulation with warfarin.  Cardiology started amiodarone. The patient was successfully extubated on 2/8.  Now being treated for group A cellulitis with IV antibiotics.  On 2/10 ID broadened ABX to ceftriaxone plus Flagyl till 2/20, with repeat Ct bgative for abscess.     * Cellulitis of right lower extremity- (present on admission)  Assessment & Plan  Blood cultures 2/4 grew GAS, with repeat blood cultures negative.   ID following.  Recommends 14 days of antibiotics from 2/6- 2/20 with  ceftriaxone and Flagyl, as blood cell count has remained high despite adequate IV antibiotics.  Patient will need a midline on discharge  CT leg negative for abscess.   Blood cultures negative so far. Lactate negative.    Fluid overload secondary to IV fluids   Assessment & Plan  Likely secondary to IV fluids given per sepsis protocol, Echo Ef of 45%, normal right ventricular function  Patient now presents with pitting edema to hips  Patient does not look septic  WBC improved since admission   No signs of sepsis. Without tachycardia or tachypnea or fever  Blood pressure stable  Lasix 40 oral twice daily- titrate as needed.  Continuous ins and outs  Daily weights  Elevate legs  Outpatient repeat echocardiogram recommended by cardiology whose recommendations are appreciated.       Start low dose lisinopril on 02/11    Atrial fibrillation with RVR ([facility])- (present on admission)  Assessment & Plan  On telemetry, ventricularly paced rhythm   Amiodarone therapy started by cardiology.  Anticoagulation with warfarin.  Patient has pacemaker for sick sinus syndrome  Continue metoprolol XL- uptitrate as needed.      Sepsis ([facility])- (present on admission)  Assessment & Plan  Resolving  Severe Sepsis Present on admission, with respiratory failure and EBEN  Patient met 3/4 SIRS criteria on admission  Admission WBC 28.8 -> now trended down since admission.  Source of infection is left lower extremity cellulitis  Sepsis protocol initiated.  Patient received IV fluids and antibiotics  Patient no longer shows signs of endorgan damage  Echo and KD normal, no vegetation  Continue ceftriaxone and flagyl. New blood cultures pending.  C diff ordered     COPD (chronic obstructive pulmonary disease) ([facility])- (present on admission)  Assessment & Plan  Not in exacerbation  18-pack-year history  Patient now on ipratropium nebulizer    Type 2 diabetes mellitus without complication, without long-term current use of insulin ([facility])- (present on  admission)  Assessment & Plan  Recent A1c 7.6  Patient is not on home insulin  SSI lispro  Likely will not need insulin therapy at home    Leukocytosis with bandemia  Assessment & Plan  -See cellulitis plan    Lactic acid acidosis- (present on admission)  Assessment & Plan  Resolved  Secondary to tissue ischemia in setting of severe sepsis.    Paroxysmal SVT (supraventricular tachycardia) (HCC)- (present on admission)  Assessment & Plan  Initially noted to be in supraventricular tachycardia changing to A. fib with RVR.  Refer to afib plan.    Acute respiratory failure with hypoxia (HCC)- (present on admission)  Assessment & Plan  Resolved  Likely combination of sepsis, A. fib RVR  Now saturating 93 to 95% on room air  Incentive spirometer    Gout Flare  -Started on colchicine and home allopurinol.   -Unlikley septic arthritis as characteristics similar to gout attacks he has had in the past and blood counts are improving.     Code status: Full code  Diet: Cardiac diabetic  Lines/ tubes/ drains: Condom cath  IV fluids: None  Abx (dates): Ceftriaxone and Flagyl  GI ppx: MiraLAX stopped.  DVT ppx: Warfarin  SNF referral placed

## 2024-11-22 NOTE — PROGRESS NOTES
BP reading logs have been charted.    Monitor summary: afib, partially paced 101-136. Converted at 1800. per strip from monitor room.

## 2025-03-24 NOTE — ASSESSMENT & PLAN NOTE
Asymptomatic hyponatremia  Hyponatremia worsening 3/22 now down to 126  3/23 stable at 129, cont current management  Likely SIADH  Free water restriction  Due to subdural hematoma will start salt supplementation    4/5: Continue salt tablets and florinef as per neurosurgery recommendations.  4/12: refusing salt tabs, will check Na in am now that he's been off for > 24 hours   Much improved   Continue sertraline

## (undated) DEVICE — KIT ANESTHESIA W/CIRCUIT & 3/LT BAG W/FILTER (20EA/CA)

## (undated) DEVICE — MASK ANESTHESIA ADULT  - (100/CA)

## (undated) DEVICE — SENSOR CEREBRAL AND SOMATIC MONITORING (20/CA)

## (undated) DEVICE — DERMABOND ADVANCED - (12EA/BX)

## (undated) DEVICE — TRANSDUCER BIFURCATED MONITORING KIT (10EA/CA)

## (undated) DEVICE — TUBING C&T SET FLYING LEADS DRAIN TUBING (10EA/BX)

## (undated) DEVICE — ORGANIZER SUTURE GABBAY-FRAT - ER STERILE (3/SET 4ST/BX)

## (undated) DEVICE — TRAY MULTI-LUMEN 7FR PRESSURE W/MAX BARRIER AND BIOPATCH - (5/CA)

## (undated) DEVICE — SENSOR SPO2 NEO LNCS ADHESIVE (20/BX) SEE USER NOTES

## (undated) DEVICE — GLOVE BIOGEL PI INDICATOR SZ 6.5 SURGICAL PF LF - (50/BX 4BX/CA)

## (undated) DEVICE — TUBE CHEST 32FR. STRAIGHT - (10EA/CA)

## (undated) DEVICE — KIT ROOM DECONTAMINATION

## (undated) DEVICE — HEAD HOLDER JUNIOR/ADULT

## (undated) DEVICE — HEMOSTAT SURG ABSORBABLE - 4 X 8 IN SURGICEL (24EA/CA)

## (undated) DEVICE — LACTATED RINGERS INJ 1000 ML - (14EA/CA 60CA/PF)

## (undated) DEVICE — PATTIES SURG X-RAYCOTTONOID - 1/2 X 3 IN (200/CA)

## (undated) DEVICE — GUIDE TRACHE TUBE INTUBATING STYLET 5.0-10.0MM 14FR (20EA/PK)

## (undated) DEVICE — LACTATED RINGERS INJ. 500 ML - (24EA/CA)

## (undated) DEVICE — SODIUM CHL IRRIGATION 0.9% 1000ML (12EA/CA)

## (undated) DEVICE — FIBRILLAR SURGICEL 4X4 - 10/CA

## (undated) DEVICE — BLADE CLIPPER FITS 2501 CLIPPER (BLUE)  (20EA/CA)

## (undated) DEVICE — GOWN WARMING STANDARD FLEX - (30/CA)

## (undated) DEVICE — TUBING CLEARLINK DUO-VENT - C-FLO (48EA/CA)

## (undated) DEVICE — Device

## (undated) DEVICE — SET EXTENSION WITH 2 PORTS (48EA/CA) ***PART #2C8610 IS A SUBSTITUTE*****

## (undated) DEVICE — KIT RADIAL ARTERY 20GA W/MAX BARRIER AND BIOPATCH  (5EA/CA) #10740 IS FOR THE SET RADIAL ARTERIAL

## (undated) DEVICE — SUTURE 4-0 30CM STRATAFIX SPIRAL PS-2 (12EA/BX)

## (undated) DEVICE — PATTIES SURG X-RAYCOTTONOID - 1 X 3 IN (200/CA)

## (undated) DEVICE — ELECTRODE DUAL RETURN W/ CORD - (50/PK)

## (undated) DEVICE — GAUZE FLUFF STERILE 2-PLY 36 X 36 (100EA/CA)

## (undated) DEVICE — GLOVE BIOGEL INDICATOR SZ 8 SURGICAL PF LTX - (50/BX 4BX/CA)

## (undated) DEVICE — SPONGE GAUZESTER 4 X 4 4PLY - (128PK/CA)

## (undated) DEVICE — D-5-W INJ. 500 ML - (24EA/CA)

## (undated) DEVICE — DRESSING SURGICAL NUKNIT 6X9 (10EA/BX)

## (undated) DEVICE — DRESSING LEUKOMED STERILE 11.75X4IN - (50/CA)

## (undated) DEVICE — SUTURE GENERAL

## (undated) DEVICE — KIT INTROPERCUTANEOUS SHEATH - 8.5 FR W/MAX BARRIER AND BIOPATCH  (5/CA)

## (undated) DEVICE — INSERT STEALTH #5 - (10/BX)

## (undated) DEVICE — STOPCOCK MALE 4-WAY - (50/CA)

## (undated) DEVICE — SLEEVE, VASO, THIGH, MED

## (undated) DEVICE — SUCTION INSTRUMENT YANKAUER BULBOUS TIP W/O VENT (50EA/CA)

## (undated) DEVICE — COVER FOOT UNIVERSAL DISP. - (25EA/CA)

## (undated) DEVICE — GLOVE BIOGEL SZ 8.5 SURGICAL PF LTX - (50PR/BX 4BX/CA)

## (undated) DEVICE — MIDAS LUBRICATOR DIFFUSER PACK (4EA/CA)

## (undated) DEVICE — DRAIN CHEST ADULT (6EA/CA) DELETED ITEM  ORDER #15909

## (undated) DEVICE — TUBE E-T HI-LO CUFF 7.5MM (10EA/PK)

## (undated) DEVICE — NEPTUNE 4 PORT MANIFOLD - (20/PK)

## (undated) DEVICE — KIT SURGIFLO W/OUT THROMBIN - (6EA/CA)

## (undated) DEVICE — PROTECTOR ULNA NERVE - (36PR/CA)

## (undated) DEVICE — TUBE CHEST 32FR. RIGHT ANGLED (10EA/CA)

## (undated) DEVICE — SUTURE 0 ETHIBOND MO6 C/R - (12/BX) 8-18 INCH ETHICON

## (undated) DEVICE — DEVICE KIT COR-KNOT COMBO2 DEVICES & 12 KNOTS PER KIT (6KT/CA)

## (undated) DEVICE — LEAD PACING TEMP MYO - (12/BX)

## (undated) DEVICE — SUTURE 2-0 VICRYL PLUS CT-1 - 8 X 18 INCH(12/BX)

## (undated) DEVICE — GLOVE BIOGEL PI INDICATOR SZ 7.0 SURGICAL PF LF - (50/BX 4BX/CA)

## (undated) DEVICE — BAG RESUSCITATION DISPOSABLE - WITH MASK (10 EA/CA)

## (undated) DEVICE — SUTURE 2-0 ETHIBOND V-5 - (6/BX)

## (undated) DEVICE — SUTURE 4-0 PROLENE RB-1 D/A 36 (36PK/BX)"

## (undated) DEVICE — PERFORATER DISP TIP DGR-0

## (undated) DEVICE — SUCTION INSTRUMENT YANKAUER OPEN TIP W/O VENT (50EA/CA)

## (undated) DEVICE — SET LEADWIRE 5 LEAD BEDSIDE DISPOSABLE ECG (1SET OF 5/EA)

## (undated) DEVICE — BATTERY VARISPEED

## (undated) DEVICE — CATHETER THERMALDILUTION SWAN - (5EA/CA)

## (undated) DEVICE — GLOVE SZ 6.5 BIOGEL PI MICRO - PF LF (50PR/BX)

## (undated) DEVICE — SODIUM CHL. INJ. 0.9% 500ML (24EA/CA 50CA/PF)

## (undated) DEVICE — MICRODRIP PRIMARY VENTED 60 (48EA/CA) THIS WAS PART #2C8428 WHICH WAS DISCONTINUED

## (undated) DEVICE — DRESSING TRANSPARENT FILM TEGADERM 4 X 4.75" (50EA/BX)"

## (undated) DEVICE — SUTURE NONABSORBABLE ETHIBOND EXCEL 2-0 V-5 2 ARM BRAID GREEN WHITE (6EA/BX)

## (undated) DEVICE — CANISTER SUCTION 3000ML MECHANICAL FILTER AUTO SHUTOFF MEDI-VAC NONSTERILE LF DISP  (40EA/CA)

## (undated) DEVICE — DRESSING XEROFORM 1X8 - (50/BX 4BX/CA)

## (undated) DEVICE — SYS DLV COST CLS RM TEMP - INJECTATE (CO-SET II) (10EA/CA)

## (undated) DEVICE — PACK CRANI - (1EA/CA)

## (undated) DEVICE — TAPE CLOTH MEDIPORE 6 INCH - (12RL/CA)

## (undated) DEVICE — SUTURE 6-0 PROLENE RB-2 D/A 30 (36PK/BX)"

## (undated) DEVICE — BANDAGE ELASTIC 4 IN X 5 YDS - LATEX FREE(10/BX 5BX/CA)

## (undated) DEVICE — TRAY SRGPRP PVP IOD WT PRP - (20/CA)

## (undated) DEVICE — LEAD SET 6 DISP. EKG NIHON KOHDEN (100EA/CA)

## (undated) DEVICE — PERFUSION

## (undated) DEVICE — ELECTRODE 850 FOAM ADHESIVE - HYDROGEL RADIOTRNSPRNT (50/PK)

## (undated) DEVICE — BLADE STERNUM SAW SURGICAL 32.0 X 6.4 MM STERILE (1/EA)

## (undated) DEVICE — PERFUSION STAT

## (undated) DEVICE — SUTURE OHS

## (undated) DEVICE — PACK CV DRAPING/BASIN 2PART - (1/CA)

## (undated) DEVICE — SUTURE 4-0 NUROLON CR/8 TF - (12/BX) ETHICON

## (undated) DEVICE — PACK E SUTURE USED FOR - OPEN HEART  (5/BX)

## (undated) DEVICE — ADHESIVE DERMABOND HVD MINI (12EA/BX)

## (undated) DEVICE — DISSECT TOOL MIDAS F2/8TA23

## (undated) DEVICE — TUBE CONNECT SUCTION CLEAR 120 X 1/4" (50EA/CA)"

## (undated) DEVICE — SURGIFOAM (SIZE 100) - (6EA/CA)

## (undated) DEVICE — ARMBOARD  SMALL IV 9 INLONG - (25EA/CA)

## (undated) DEVICE — WIRE STEEL 5-0 B&S 20 OHS - 5/PK 12PK/BX ITEM. D5329 OR D6625 CAN BE USED AS A SUB

## (undated) DEVICE — BANDAGE ROLL STERILE BULKEE 4.5 IN X 4 YD (100EA/CA)

## (undated) DEVICE — DRAPE STRLE REG TOWEL 18X24 - (10/BX 4BX/CA)"

## (undated) DEVICE — SUTURE 5-0 PROLENE C-1 D/A 24 (36PK/BX)"

## (undated) DEVICE — GLOVE BIOGEL PI INDICATOR SZ 8.5 SURGICAL PF LF - (50PR/BX 4BX/CA)

## (undated) DEVICE — SET BIFURCATED BLOOD - (48EA/CS)

## (undated) DEVICE — SUTURE 4-0 PROLENE V-7 D/A (36PK/BX)

## (undated) DEVICE — LIGHT SOURCE MIS 12FT

## (undated) DEVICE — POLY UMBILICAL TAPE 1/8X30 - (36/BX)

## (undated) DEVICE — SUTURE 4-0 PROLENE SH 36 (36PK/BX)"

## (undated) DEVICE — DRESSING TRANSPARENT FILM TEGADERM 2.375 X 2.75"  (100EA/BX)"

## (undated) DEVICE — QUICKLOADS COR-KNOT TITANIUM - (6EA/PK 12PK/BX)

## (undated) DEVICE — BLADE SURGICAL #15 - (50/BX 3BX/CA)

## (undated) DEVICE — GLOVE BIOGEL SZ 7.5 SURGICAL PF LTX - (50PR/BX 4BX/CA)

## (undated) DEVICE — SET FLUID WARMING STANDARD FLOW - (10/CA)

## (undated) DEVICE — SOD. CHL. INJ. 0.9% 1000 ML - (14EA/CA 60CA/PF)

## (undated) DEVICE — TRAY SURESTEP FOLEY TEMP SENSING 16FR (10EA/CA) ORDER  #18764 FOR TEMP FOLEY ONLY

## (undated) DEVICE — SUTURE 2-0 ETHIBOND SH D/A 36 (36PK/BX)"